# Patient Record
Sex: MALE | Race: BLACK OR AFRICAN AMERICAN | NOT HISPANIC OR LATINO
[De-identification: names, ages, dates, MRNs, and addresses within clinical notes are randomized per-mention and may not be internally consistent; named-entity substitution may affect disease eponyms.]

---

## 2017-01-03 ENCOUNTER — MEDICATION RENEWAL (OUTPATIENT)
Age: 47
End: 2017-01-03

## 2017-01-04 ENCOUNTER — MEDICATION RENEWAL (OUTPATIENT)
Age: 47
End: 2017-01-04

## 2017-02-17 ENCOUNTER — MEDICATION RENEWAL (OUTPATIENT)
Age: 47
End: 2017-02-17

## 2017-03-03 ENCOUNTER — APPOINTMENT (OUTPATIENT)
Dept: UROLOGY | Facility: CLINIC | Age: 47
End: 2017-03-03

## 2017-03-09 ENCOUNTER — APPOINTMENT (OUTPATIENT)
Age: 47
End: 2017-03-09

## 2017-03-13 ENCOUNTER — APPOINTMENT (OUTPATIENT)
Dept: UROLOGY | Facility: CLINIC | Age: 47
End: 2017-03-13

## 2017-04-18 ENCOUNTER — MEDICATION RENEWAL (OUTPATIENT)
Age: 47
End: 2017-04-18

## 2017-05-22 ENCOUNTER — MEDICATION RENEWAL (OUTPATIENT)
Age: 47
End: 2017-05-22

## 2017-05-24 ENCOUNTER — APPOINTMENT (OUTPATIENT)
Dept: INTERNAL MEDICINE | Facility: CLINIC | Age: 47
End: 2017-05-24

## 2017-06-21 ENCOUNTER — APPOINTMENT (OUTPATIENT)
Dept: INTERNAL MEDICINE | Facility: CLINIC | Age: 47
End: 2017-06-21

## 2017-06-21 VITALS
BODY MASS INDEX: 16.52 KG/M2 | TEMPERATURE: 97.9 F | WEIGHT: 118 LBS | OXYGEN SATURATION: 99 % | HEIGHT: 71 IN | HEART RATE: 88 BPM | SYSTOLIC BLOOD PRESSURE: 116 MMHG | DIASTOLIC BLOOD PRESSURE: 73 MMHG | RESPIRATION RATE: 17 BRPM

## 2017-07-01 ENCOUNTER — OUTPATIENT (OUTPATIENT)
Dept: OUTPATIENT SERVICES | Facility: HOSPITAL | Age: 47
LOS: 1 days | End: 2017-07-01
Payer: MEDICAID

## 2017-07-01 DIAGNOSIS — L02.31 CUTANEOUS ABSCESS OF BUTTOCK: Chronic | ICD-10-CM

## 2017-07-01 DIAGNOSIS — N18.6 END STAGE RENAL DISEASE: Chronic | ICD-10-CM

## 2017-07-01 DIAGNOSIS — T83.410S BREAKDOWN (MECHANICAL) OF IMPLANTED PENILE PROSTHESIS, SEQUELA: Chronic | ICD-10-CM

## 2017-07-01 DIAGNOSIS — I77.0 ARTERIOVENOUS FISTULA, ACQUIRED: Chronic | ICD-10-CM

## 2017-07-01 DIAGNOSIS — Z98.89 OTHER SPECIFIED POSTPROCEDURAL STATES: Chronic | ICD-10-CM

## 2017-07-06 ENCOUNTER — MEDICATION RENEWAL (OUTPATIENT)
Age: 47
End: 2017-07-06

## 2017-07-10 ENCOUNTER — APPOINTMENT (OUTPATIENT)
Dept: UROLOGY | Facility: CLINIC | Age: 47
End: 2017-07-10

## 2017-07-12 DIAGNOSIS — R69 ILLNESS, UNSPECIFIED: ICD-10-CM

## 2017-07-24 ENCOUNTER — APPOINTMENT (OUTPATIENT)
Dept: INTERNAL MEDICINE | Facility: CLINIC | Age: 47
End: 2017-07-24

## 2017-08-18 ENCOUNTER — MEDICATION RENEWAL (OUTPATIENT)
Age: 47
End: 2017-08-18

## 2017-08-21 ENCOUNTER — APPOINTMENT (OUTPATIENT)
Dept: INTERNAL MEDICINE | Facility: CLINIC | Age: 47
End: 2017-08-21

## 2017-08-23 ENCOUNTER — APPOINTMENT (OUTPATIENT)
Dept: INTERNAL MEDICINE | Facility: CLINIC | Age: 47
End: 2017-08-23
Payer: MEDICARE

## 2017-08-23 VITALS
TEMPERATURE: 99.2 F | HEART RATE: 63 BPM | OXYGEN SATURATION: 66 % | RESPIRATION RATE: 17 BRPM | HEIGHT: 71 IN | DIASTOLIC BLOOD PRESSURE: 95 MMHG | SYSTOLIC BLOOD PRESSURE: 170 MMHG

## 2017-08-23 PROCEDURE — 99214 OFFICE O/P EST MOD 30 MIN: CPT | Mod: Q5

## 2017-09-01 PROCEDURE — G9001: CPT

## 2017-09-06 ENCOUNTER — APPOINTMENT (OUTPATIENT)
Dept: OTOLARYNGOLOGY | Facility: CLINIC | Age: 47
End: 2017-09-06
Payer: MEDICARE

## 2017-09-06 VITALS
DIASTOLIC BLOOD PRESSURE: 75 MMHG | BODY MASS INDEX: 19.32 KG/M2 | HEART RATE: 71 BPM | SYSTOLIC BLOOD PRESSURE: 126 MMHG | HEIGHT: 71 IN | WEIGHT: 138 LBS

## 2017-09-06 DIAGNOSIS — Z86.79 PERSONAL HISTORY OF OTHER DISEASES OF THE CIRCULATORY SYSTEM: ICD-10-CM

## 2017-09-06 DIAGNOSIS — Z87.448 PERSONAL HISTORY OF OTHER DISEASES OF URINARY SYSTEM: ICD-10-CM

## 2017-09-06 DIAGNOSIS — F17.200 NICOTINE DEPENDENCE, UNSPECIFIED, UNCOMPLICATED: ICD-10-CM

## 2017-09-06 DIAGNOSIS — Z87.19 PERSONAL HISTORY OF OTHER DISEASES OF THE DIGESTIVE SYSTEM: ICD-10-CM

## 2017-09-06 PROCEDURE — 92567 TYMPANOMETRY: CPT

## 2017-09-06 PROCEDURE — 92557 COMPREHENSIVE HEARING TEST: CPT

## 2017-09-06 PROCEDURE — 99204 OFFICE O/P NEW MOD 45 MIN: CPT | Mod: 25

## 2017-09-09 ENCOUNTER — RX RENEWAL (OUTPATIENT)
Age: 47
End: 2017-09-09

## 2017-09-12 ENCOUNTER — MEDICATION RENEWAL (OUTPATIENT)
Age: 47
End: 2017-09-12

## 2017-09-15 ENCOUNTER — APPOINTMENT (OUTPATIENT)
Dept: OTOLARYNGOLOGY | Facility: CLINIC | Age: 47
End: 2017-09-15
Payer: MEDICARE

## 2017-09-15 ENCOUNTER — MEDICATION RENEWAL (OUTPATIENT)
Age: 47
End: 2017-09-15

## 2017-09-15 VITALS
HEART RATE: 72 BPM | SYSTOLIC BLOOD PRESSURE: 137 MMHG | WEIGHT: 138 LBS | BODY MASS INDEX: 19.32 KG/M2 | DIASTOLIC BLOOD PRESSURE: 83 MMHG | HEIGHT: 71 IN

## 2017-09-15 DIAGNOSIS — H93.8X1 OTHER SPECIFIED DISORDERS OF RIGHT EAR: ICD-10-CM

## 2017-09-15 PROCEDURE — 99213 OFFICE O/P EST LOW 20 MIN: CPT | Mod: 25

## 2017-09-15 PROCEDURE — 69210 REMOVE IMPACTED EAR WAX UNI: CPT

## 2017-09-27 ENCOUNTER — MEDICATION RENEWAL (OUTPATIENT)
Age: 47
End: 2017-09-27

## 2017-10-02 ENCOUNTER — RX RENEWAL (OUTPATIENT)
Age: 47
End: 2017-10-02

## 2017-10-02 ENCOUNTER — MEDICATION RENEWAL (OUTPATIENT)
Age: 47
End: 2017-10-02

## 2017-10-12 ENCOUNTER — MEDICATION RENEWAL (OUTPATIENT)
Age: 47
End: 2017-10-12

## 2017-10-12 RX ORDER — METOPROLOL SUCCINATE 50 MG/1
50 TABLET, EXTENDED RELEASE ORAL DAILY
Qty: 30 | Refills: 3 | Status: DISCONTINUED | COMMUNITY
End: 2017-10-12

## 2017-10-25 ENCOUNTER — APPOINTMENT (OUTPATIENT)
Dept: INTERNAL MEDICINE | Facility: CLINIC | Age: 47
End: 2017-10-25

## 2017-10-25 ENCOUNTER — APPOINTMENT (OUTPATIENT)
Dept: OTOLARYNGOLOGY | Facility: CLINIC | Age: 47
End: 2017-10-25

## 2017-10-25 RX ORDER — OXYCODONE 5 MG/1
5 TABLET ORAL
Refills: 0 | Status: DISCONTINUED | COMMUNITY
End: 2017-10-25

## 2017-11-01 ENCOUNTER — APPOINTMENT (OUTPATIENT)
Dept: OTOLARYNGOLOGY | Facility: CLINIC | Age: 47
End: 2017-11-01

## 2017-11-09 ENCOUNTER — RX RENEWAL (OUTPATIENT)
Age: 47
End: 2017-11-09

## 2017-11-22 ENCOUNTER — APPOINTMENT (OUTPATIENT)
Dept: OTOLARYNGOLOGY | Facility: CLINIC | Age: 47
End: 2017-11-22

## 2017-12-11 ENCOUNTER — APPOINTMENT (OUTPATIENT)
Dept: INTERNAL MEDICINE | Facility: CLINIC | Age: 47
End: 2017-12-11
Payer: MEDICARE

## 2017-12-11 VITALS
WEIGHT: 140 LBS | BODY MASS INDEX: 19.6 KG/M2 | HEART RATE: 96 BPM | RESPIRATION RATE: 17 BRPM | DIASTOLIC BLOOD PRESSURE: 75 MMHG | OXYGEN SATURATION: 94 % | HEIGHT: 71 IN | TEMPERATURE: 98.1 F | SYSTOLIC BLOOD PRESSURE: 134 MMHG

## 2017-12-11 PROCEDURE — 99396 PREV VISIT EST AGE 40-64: CPT

## 2017-12-14 ENCOUNTER — FORM ENCOUNTER (OUTPATIENT)
Age: 47
End: 2017-12-14

## 2017-12-15 ENCOUNTER — APPOINTMENT (OUTPATIENT)
Dept: RHEUMATOLOGY | Facility: CLINIC | Age: 47
End: 2017-12-15
Payer: MEDICARE

## 2017-12-15 ENCOUNTER — LABORATORY RESULT (OUTPATIENT)
Age: 47
End: 2017-12-15

## 2017-12-15 VITALS
BODY MASS INDEX: 19.6 KG/M2 | RESPIRATION RATE: 16 BRPM | SYSTOLIC BLOOD PRESSURE: 117 MMHG | OXYGEN SATURATION: 98 % | TEMPERATURE: 98 F | DIASTOLIC BLOOD PRESSURE: 72 MMHG | HEART RATE: 90 BPM | WEIGHT: 140 LBS | HEIGHT: 71 IN

## 2017-12-15 PROCEDURE — 73600 X-RAY EXAM OF ANKLE: CPT | Mod: RT

## 2017-12-15 PROCEDURE — 73080 X-RAY EXAM OF ELBOW: CPT | Mod: RT

## 2017-12-15 PROCEDURE — 73564 X-RAY EXAM KNEE 4 OR MORE: CPT | Mod: LT

## 2017-12-15 PROCEDURE — 99205 OFFICE O/P NEW HI 60 MIN: CPT

## 2017-12-15 PROCEDURE — 73130 X-RAY EXAM OF HAND: CPT | Mod: RT

## 2017-12-15 PROCEDURE — 73110 X-RAY EXAM OF WRIST: CPT | Mod: RT

## 2017-12-29 LAB
25(OH)D3 SERPL-MCNC: 19.7 NG/ML
ALBUMIN SERPL ELPH-MCNC: 4.3 G/DL
ALP BLD-CCNC: 174 U/L
ALT SERPL-CCNC: 31 U/L
ANA SER IF-ACNC: NEGATIVE
ANION GAP SERPL CALC-SCNC: 18 MMOL/L
APPEARANCE: CLEAR
APTT BLD: 30.8 SEC
AST SERPL-CCNC: 32 U/L
B2 GLYCOPROT1 AB SER QL: NEGATIVE
BACTERIA: NEGATIVE
BASOPHILS # BLD AUTO: 0.02 K/UL
BASOPHILS NFR BLD AUTO: 0.4 %
BILIRUB SERPL-MCNC: 0.7 MG/DL
BILIRUBIN URINE: NEGATIVE
BLOOD URINE: NEGATIVE
BUN SERPL-MCNC: 28 MG/DL
CALCIUM SERPL-MCNC: 9.1 MG/DL
CARDIOLIPIN AB SER IA-ACNC: NEGATIVE
CCP AB SER IA-ACNC: <8 UNITS
CENTROMERE IGG SER-ACNC: <0.2 AL
CHLORIDE SERPL-SCNC: 95 MMOL/L
CO2 SERPL-SCNC: 32 MMOL/L
COLOR: YELLOW
CREAT SERPL-MCNC: 5.74 MG/DL
CREAT SPEC-SCNC: 72 MG/DL
CREAT/PROT UR: 1.8 RATIO
CRP SERPL-MCNC: 0.4 MG/DL
DSDNA AB SER-ACNC: <12 IU/ML
ENA RNP AB SER IA-ACNC: <0.2 AL
ENA SCL70 IGG SER IA-ACNC: <0.2 AL
ENA SM AB SER IA-ACNC: <0.2 AL
ENA SS-A AB SER IA-ACNC: <0.2 AL
ENA SS-B AB SER IA-ACNC: <0.2 AL
EOSINOPHIL # BLD AUTO: 0.06 K/UL
EOSINOPHIL NFR BLD AUTO: 1.2
ERYTHROCYTE [SEDIMENTATION RATE] IN BLOOD BY WESTERGREN METHOD: 49 MM/HR
G6PD SER-CCNC: 15.6 U/G HGB
GLUCOSE QUALITATIVE U: NEGATIVE MG/DL
GLUCOSE SERPL-MCNC: 99 MG/DL
HCT VFR BLD CALC: 25.4 %
HGB BLD-MCNC: 8.1 G/DL
HYALINE CASTS: 2 /LPF
IMM GRANULOCYTES NFR BLD AUTO: 0.2 %
KETONES URINE: NEGATIVE
LEUKOCYTE ESTERASE URINE: NEGATIVE
LYMPHOCYTES # BLD AUTO: 1.76 K/UL
LYMPHOCYTES NFR BLD AUTO: 35.3 %
MAN DIFF?: NORMAL
MCHC RBC-ENTMCNC: 31.9 GM/DL
MCHC RBC-ENTMCNC: 33.6 PG
MCV RBC AUTO: 105.4 FL
MICROSCOPIC-UA: NORMAL
MONOCYTES # BLD AUTO: 0.86 K/UL
MONOCYTES NFR BLD AUTO: 17.2 %
NEUTROPHILS # BLD AUTO: 2.28 K/UL
NEUTROPHILS NFR BLD AUTO: 45.7 %
NITRITE URINE: NEGATIVE
PH URINE: >8.5
PLATELET # BLD AUTO: 187 K/UL
POTASSIUM SERPL-SCNC: 4.5 MMOL/L
PROT SERPL-MCNC: 7.4 G/DL
PROT UR-MCNC: 127 MG/DL
PROTEIN URINE: 100 MG/DL
RBC # BLD: 2.41 M/UL
RBC # FLD: 20.7 %
RED BLOOD CELLS URINE: 1 /HPF
RF+CCP IGG SER-IMP: NEGATIVE
RHEUMATOID FACT SER QL: 15 IU/ML
RNA POLYMERASE III IGG: <10 U
SODIUM SERPL-SCNC: 145 MMOL/L
SPECIFIC GRAVITY URINE: 1.01
SQUAMOUS EPITHELIAL CELLS: 1 /HPF
THYROGLOB AB SERPL-ACNC: <20 IU/ML
THYROPEROXIDASE AB SERPL IA-ACNC: <10 IU/ML
TSH SERPL-ACNC: 3.05 UIU/ML
URATE SERPL-MCNC: 3.9 MG/DL
UROBILINOGEN URINE: NEGATIVE MG/DL
WBC # FLD AUTO: 4.99 K/UL
WHITE BLOOD CELLS URINE: 1 /HPF

## 2018-01-05 ENCOUNTER — RX RENEWAL (OUTPATIENT)
Age: 48
End: 2018-01-05

## 2018-01-19 ENCOUNTER — APPOINTMENT (OUTPATIENT)
Dept: RHEUMATOLOGY | Facility: CLINIC | Age: 48
End: 2018-01-19
Payer: MEDICARE

## 2018-01-19 VITALS
OXYGEN SATURATION: 98 % | DIASTOLIC BLOOD PRESSURE: 96 MMHG | SYSTOLIC BLOOD PRESSURE: 156 MMHG | HEART RATE: 63 BPM | TEMPERATURE: 98.5 F | HEIGHT: 71 IN

## 2018-01-19 DIAGNOSIS — L94.3 SCLERODACTYLY: ICD-10-CM

## 2018-01-19 PROCEDURE — 99213 OFFICE O/P EST LOW 20 MIN: CPT

## 2018-01-28 PROBLEM — L94.3 SCLERODACTYLY: Status: ACTIVE | Noted: 2018-01-28

## 2018-01-28 PROBLEM — L94.3 SCLERODACTYLY: Status: RESOLVED | Noted: 2017-12-15 | Resolved: 2018-01-28

## 2018-02-02 ENCOUNTER — APPOINTMENT (OUTPATIENT)
Dept: INTERNAL MEDICINE | Facility: CLINIC | Age: 48
End: 2018-02-02

## 2018-02-05 ENCOUNTER — APPOINTMENT (OUTPATIENT)
Dept: OTOLARYNGOLOGY | Facility: CLINIC | Age: 48
End: 2018-02-05

## 2018-02-07 ENCOUNTER — APPOINTMENT (OUTPATIENT)
Dept: INTERNAL MEDICINE | Facility: CLINIC | Age: 48
End: 2018-02-07
Payer: MEDICARE

## 2018-02-07 VITALS
TEMPERATURE: 98.1 F | HEIGHT: 71 IN | WEIGHT: 145 LBS | SYSTOLIC BLOOD PRESSURE: 109 MMHG | BODY MASS INDEX: 20.3 KG/M2 | DIASTOLIC BLOOD PRESSURE: 70 MMHG | OXYGEN SATURATION: 99 % | RESPIRATION RATE: 16 BRPM | HEART RATE: 74 BPM

## 2018-02-07 DIAGNOSIS — D64.9 ANEMIA, UNSPECIFIED: ICD-10-CM

## 2018-02-07 PROCEDURE — 93000 ELECTROCARDIOGRAM COMPLETE: CPT

## 2018-02-07 PROCEDURE — 90471 IMMUNIZATION ADMIN: CPT

## 2018-02-07 PROCEDURE — 90733 MPSV4 VACCINE SUBQ: CPT

## 2018-02-07 PROCEDURE — 99215 OFFICE O/P EST HI 40 MIN: CPT | Mod: 25

## 2018-02-07 RX ORDER — FAMOTIDINE 40 MG/1
40 TABLET, FILM COATED ORAL
Refills: 0 | Status: DISCONTINUED | COMMUNITY
End: 2018-02-07

## 2018-02-07 RX ORDER — GABAPENTIN 100 MG/1
100 CAPSULE ORAL
Refills: 0 | Status: DISCONTINUED | COMMUNITY
End: 2018-02-07

## 2018-02-07 RX ORDER — SUCROFERRIC OXYHYDROXIDE 500 MG/1
500 TABLET, CHEWABLE ORAL
Refills: 0 | Status: ACTIVE | COMMUNITY

## 2018-02-07 RX ORDER — METHYLPREDNISOLONE 4 MG/1
4 TABLET ORAL
Qty: 21 | Refills: 0 | Status: DISCONTINUED | COMMUNITY
Start: 2017-09-06 | End: 2018-02-07

## 2018-02-21 ENCOUNTER — APPOINTMENT (OUTPATIENT)
Dept: OTOLARYNGOLOGY | Facility: CLINIC | Age: 48
End: 2018-02-21

## 2018-02-21 ENCOUNTER — APPOINTMENT (OUTPATIENT)
Dept: INTERNAL MEDICINE | Facility: CLINIC | Age: 48
End: 2018-02-21
Payer: MEDICARE

## 2018-02-21 VITALS
BODY MASS INDEX: 19.46 KG/M2 | WEIGHT: 139 LBS | TEMPERATURE: 98.7 F | DIASTOLIC BLOOD PRESSURE: 74 MMHG | RESPIRATION RATE: 17 BRPM | HEART RATE: 69 BPM | OXYGEN SATURATION: 96 % | HEIGHT: 71 IN | SYSTOLIC BLOOD PRESSURE: 149 MMHG

## 2018-02-21 DIAGNOSIS — Z90.411 OTHER SPECIFIED POSTPROCEDURAL STATES: ICD-10-CM

## 2018-02-21 DIAGNOSIS — Z98.890 OTHER SPECIFIED POSTPROCEDURAL STATES: ICD-10-CM

## 2018-02-21 PROCEDURE — 99214 OFFICE O/P EST MOD 30 MIN: CPT

## 2018-03-09 ENCOUNTER — MEDICATION RENEWAL (OUTPATIENT)
Age: 48
End: 2018-03-09

## 2018-03-19 ENCOUNTER — APPOINTMENT (OUTPATIENT)
Dept: NEPHROLOGY | Facility: CLINIC | Age: 48
End: 2018-03-19
Payer: COMMERCIAL

## 2018-03-19 VITALS
HEART RATE: 78 BPM | TEMPERATURE: 98 F | WEIGHT: 143 LBS | RESPIRATION RATE: 14 BRPM | SYSTOLIC BLOOD PRESSURE: 155 MMHG | BODY MASS INDEX: 20.02 KG/M2 | HEIGHT: 71 IN | OXYGEN SATURATION: 97 % | DIASTOLIC BLOOD PRESSURE: 79 MMHG

## 2018-03-19 PROCEDURE — 99204 OFFICE O/P NEW MOD 45 MIN: CPT

## 2018-03-22 ENCOUNTER — RX RENEWAL (OUTPATIENT)
Age: 48
End: 2018-03-22

## 2018-04-06 ENCOUNTER — RX RENEWAL (OUTPATIENT)
Age: 48
End: 2018-04-06

## 2018-04-13 ENCOUNTER — APPOINTMENT (OUTPATIENT)
Dept: HEMATOLOGY ONCOLOGY | Facility: CLINIC | Age: 48
End: 2018-04-13

## 2018-04-23 ENCOUNTER — APPOINTMENT (OUTPATIENT)
Dept: RHEUMATOLOGY | Facility: CLINIC | Age: 48
End: 2018-04-23

## 2018-05-01 ENCOUNTER — RX RENEWAL (OUTPATIENT)
Age: 48
End: 2018-05-01

## 2018-05-08 ENCOUNTER — MEDICATION RENEWAL (OUTPATIENT)
Age: 48
End: 2018-05-08

## 2018-05-14 ENCOUNTER — RX RENEWAL (OUTPATIENT)
Age: 48
End: 2018-05-14

## 2018-05-23 ENCOUNTER — RX RENEWAL (OUTPATIENT)
Age: 48
End: 2018-05-23

## 2018-06-15 ENCOUNTER — NON-APPOINTMENT (OUTPATIENT)
Age: 48
End: 2018-06-15

## 2018-06-15 ENCOUNTER — APPOINTMENT (OUTPATIENT)
Dept: CARDIOLOGY | Facility: CLINIC | Age: 48
End: 2018-06-15
Payer: MEDICARE

## 2018-06-15 VITALS — SYSTOLIC BLOOD PRESSURE: 124 MMHG | DIASTOLIC BLOOD PRESSURE: 70 MMHG

## 2018-06-15 VITALS
SYSTOLIC BLOOD PRESSURE: 137 MMHG | DIASTOLIC BLOOD PRESSURE: 81 MMHG | HEART RATE: 71 BPM | BODY MASS INDEX: 20.02 KG/M2 | HEIGHT: 71 IN | WEIGHT: 143 LBS

## 2018-06-15 PROCEDURE — 99215 OFFICE O/P EST HI 40 MIN: CPT

## 2018-06-15 PROCEDURE — 93000 ELECTROCARDIOGRAM COMPLETE: CPT

## 2018-06-15 RX ORDER — NIFEDIPINE 90 MG/1
90 TABLET, EXTENDED RELEASE ORAL DAILY
Qty: 30 | Refills: 5 | Status: DISCONTINUED | COMMUNITY
End: 2018-06-15

## 2018-06-15 RX ORDER — NIFEDIPINE 90 MG/1
90 TABLET, EXTENDED RELEASE ORAL
Qty: 90 | Refills: 0 | Status: COMPLETED | COMMUNITY
Start: 2018-02-07

## 2018-06-20 ENCOUNTER — RX RENEWAL (OUTPATIENT)
Age: 48
End: 2018-06-20

## 2018-07-04 ENCOUNTER — RX RENEWAL (OUTPATIENT)
Age: 48
End: 2018-07-04

## 2018-07-06 ENCOUNTER — APPOINTMENT (OUTPATIENT)
Dept: OTOLARYNGOLOGY | Facility: CLINIC | Age: 48
End: 2018-07-06
Payer: MEDICARE

## 2018-07-06 VITALS
WEIGHT: 143 LBS | HEIGHT: 71 IN | SYSTOLIC BLOOD PRESSURE: 144 MMHG | DIASTOLIC BLOOD PRESSURE: 83 MMHG | HEART RATE: 72 BPM | BODY MASS INDEX: 20.02 KG/M2

## 2018-07-06 DIAGNOSIS — H65.491 OTHER CHRONIC NONSUPPURATIVE OTITIS MEDIA, RIGHT EAR: ICD-10-CM

## 2018-07-06 DIAGNOSIS — H90.8 MIXED CONDUCTIVE AND SENSORINEURAL HEARING LOSS, UNSPECIFIED: ICD-10-CM

## 2018-07-06 PROCEDURE — 99214 OFFICE O/P EST MOD 30 MIN: CPT | Mod: 25

## 2018-07-06 PROCEDURE — 69210 REMOVE IMPACTED EAR WAX UNI: CPT

## 2018-07-09 ENCOUNTER — APPOINTMENT (OUTPATIENT)
Dept: CARDIOLOGY | Facility: CLINIC | Age: 48
End: 2018-07-09

## 2018-07-11 ENCOUNTER — APPOINTMENT (OUTPATIENT)
Dept: CARDIOLOGY | Facility: CLINIC | Age: 48
End: 2018-07-11
Payer: MEDICARE

## 2018-07-11 PROCEDURE — A9500: CPT

## 2018-07-11 PROCEDURE — 93015 CV STRESS TEST SUPVJ I&R: CPT

## 2018-07-11 PROCEDURE — 78452 HT MUSCLE IMAGE SPECT MULT: CPT

## 2018-07-24 ENCOUNTER — RX RENEWAL (OUTPATIENT)
Age: 48
End: 2018-07-24

## 2018-07-27 ENCOUNTER — OUTPATIENT (OUTPATIENT)
Dept: OUTPATIENT SERVICES | Facility: HOSPITAL | Age: 48
LOS: 1 days | Discharge: HOME | End: 2018-07-27
Payer: MEDICARE

## 2018-07-27 DIAGNOSIS — I77.0 ARTERIOVENOUS FISTULA, ACQUIRED: Chronic | ICD-10-CM

## 2018-07-27 DIAGNOSIS — Z98.89 OTHER SPECIFIED POSTPROCEDURAL STATES: Chronic | ICD-10-CM

## 2018-07-27 DIAGNOSIS — T83.410S BREAKDOWN (MECHANICAL) OF IMPLANTED PENILE PROSTHESIS, SEQUELA: Chronic | ICD-10-CM

## 2018-07-27 DIAGNOSIS — L02.31 CUTANEOUS ABSCESS OF BUTTOCK: Chronic | ICD-10-CM

## 2018-07-27 DIAGNOSIS — C25.9 MALIGNANT NEOPLASM OF PANCREAS, UNSPECIFIED: ICD-10-CM

## 2018-07-27 DIAGNOSIS — N18.6 END STAGE RENAL DISEASE: Chronic | ICD-10-CM

## 2018-07-31 ENCOUNTER — RESULT REVIEW (OUTPATIENT)
Age: 48
End: 2018-07-31

## 2018-07-31 PROCEDURE — 88321 CONSLTJ&REPRT SLD PREP ELSWR: CPT

## 2018-08-01 ENCOUNTER — APPOINTMENT (OUTPATIENT)
Dept: HEMATOLOGY ONCOLOGY | Facility: CLINIC | Age: 48
End: 2018-08-01
Payer: MEDICARE

## 2018-08-01 VITALS
TEMPERATURE: 99.5 F | DIASTOLIC BLOOD PRESSURE: 69 MMHG | HEIGHT: 70.47 IN | RESPIRATION RATE: 16 BRPM | HEART RATE: 77 BPM | BODY MASS INDEX: 19 KG/M2 | SYSTOLIC BLOOD PRESSURE: 117 MMHG | WEIGHT: 134.24 LBS | OXYGEN SATURATION: 96 %

## 2018-08-01 DIAGNOSIS — Z87.891 PERSONAL HISTORY OF NICOTINE DEPENDENCE: ICD-10-CM

## 2018-08-01 DIAGNOSIS — H40.9 UNSPECIFIED GLAUCOMA: ICD-10-CM

## 2018-08-01 PROCEDURE — 99205 OFFICE O/P NEW HI 60 MIN: CPT

## 2018-08-01 RX ORDER — METHYLPREDNISOLONE 4 MG/1
4 TABLET ORAL
Qty: 21 | Refills: 0 | Status: DISCONTINUED | COMMUNITY
Start: 2018-07-06 | End: 2018-08-01

## 2018-08-01 RX ORDER — LATANOPROST/PF 0.005 %
0.01 DROPS OPHTHALMIC (EYE)
Refills: 0 | Status: ACTIVE | COMMUNITY
Start: 2018-08-01

## 2018-08-01 RX ORDER — OXYCODONE 5 MG/1
5 TABLET ORAL
Qty: 30 | Refills: 0 | Status: DISCONTINUED | COMMUNITY
Start: 2018-02-21 | End: 2018-08-01

## 2018-08-05 ENCOUNTER — RX RENEWAL (OUTPATIENT)
Age: 48
End: 2018-08-05

## 2018-08-16 ENCOUNTER — RX RENEWAL (OUTPATIENT)
Age: 48
End: 2018-08-16

## 2018-08-17 ENCOUNTER — MESSAGE (OUTPATIENT)
Age: 48
End: 2018-08-17

## 2018-08-17 ENCOUNTER — MEDICATION RENEWAL (OUTPATIENT)
Age: 48
End: 2018-08-17

## 2018-08-29 ENCOUNTER — RX RENEWAL (OUTPATIENT)
Age: 48
End: 2018-08-29

## 2018-08-29 ENCOUNTER — APPOINTMENT (OUTPATIENT)
Dept: INTERNAL MEDICINE | Facility: CLINIC | Age: 48
End: 2018-08-29
Payer: MEDICARE

## 2018-08-29 VITALS
HEIGHT: 70 IN | DIASTOLIC BLOOD PRESSURE: 70 MMHG | WEIGHT: 134 LBS | RESPIRATION RATE: 16 BRPM | HEART RATE: 80 BPM | SYSTOLIC BLOOD PRESSURE: 120 MMHG | OXYGEN SATURATION: 96 % | BODY MASS INDEX: 19.18 KG/M2

## 2018-08-29 PROCEDURE — 99214 OFFICE O/P EST MOD 30 MIN: CPT

## 2018-08-30 NOTE — PHYSICAL EXAM
[General Appearance - Alert] : alert [General Appearance - In No Acute Distress] : in no acute distress [General Appearance - Well Nourished] : well nourished [General Appearance - Well Developed] : well developed [Sclera] : the sclera and conjunctiva were normal [PERRL With Normal Accommodation] : pupils were equal in size, round, and reactive to light [Extraocular Movements] : extraocular movements were intact [Outer Ear] : the ears and nose were normal in appearance [Oropharynx] : the oropharynx was normal [Neck Appearance] : the appearance of the neck was normal [Neck Cervical Mass (___cm)] : no neck mass was observed [Jugular Venous Distention Increased] : there was no jugular-venous distention [Thyroid Diffuse Enlargement] : the thyroid was not enlarged [Thyroid Nodule] : there were no palpable thyroid nodules [Auscultation Breath Sounds / Voice Sounds] : lungs were clear to auscultation bilaterally [Apical Impulse] : the apical impulse was normal [Heart Rate And Rhythm] : heart rate was normal and rhythm regular [Heart Sounds] : normal S1 and S2 [Bowel Sounds] : normal bowel sounds [Abdomen Soft] : soft [Abdomen Tenderness] : non-tender [Abdomen Mass (___ Cm)] : no abdominal mass palpated [Cervical Lymph Nodes Enlarged Posterior Bilaterally] : posterior cervical [Cervical Lymph Nodes Enlarged Anterior Bilaterally] : anterior cervical [Abnormal Walk] : normal gait [Nail Clubbing] : no clubbing  or cyanosis of the fingernails [Musculoskeletal - Swelling] : no joint swelling seen [Motor Tone] : muscle strength and tone were normal [Skin Color & Pigmentation] : normal skin color and pigmentation [Skin Turgor] : normal skin turgor [] : no rash [Deep Tendon Reflexes (DTR)] : deep tendon reflexes were 2+ and symmetric [Sensation] : the sensory exam was normal to light touch and pinprick [No Focal Deficits] : no focal deficits [Oriented To Time, Place, And Person] : oriented to person, place, and time [Impaired Insight] : insight and judgment were intact [Affect] : the affect was normal [FreeTextEntry1] : L arm fistula with strong thrill, walking with normal gait

## 2018-08-30 NOTE — HISTORY OF PRESENT ILLNESS
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  \par \par For hx of ESRD, he f/u [facility] kidney transplant team next month, on list 5 years\par \par Dr. Gray new nephrologist\par \par here to complete disability permit application\par \par \par Meds reviewed: hydralazine 50 TID, nifedipine 90 QD, metoprolol 50 bid, ASA, stool softener\par alprazolam .25, famotidine 20, oxy 5 PRN daily\par HTN- his blood pressure is stable today \par FamHX: father with HTN and PPD, mother with HTN\par Allergies: Morphine (Swelling)\par \par

## 2018-08-30 NOTE — ASSESSMENT
[FreeTextEntry1] : \par Patient is a 47 yo with PMH  ESRD on HD T/Th/Sat via L arm fistula since Aug 2013 (at [facility], followed by nephrologist ), essential HTN, RA, gout, anemia, impotence s/p implantation of penile prosthetic presents f/u chronic medical conditions listed below\par \par 1. essential HTN\par -con't current\par -f/u with nephrologist for BP med adjustment\par \par \par 2. ESRD\par - on HD T/Th/Sat via L arm fistula since Aug 2013 (at [facility], followed by nephrologist - Xu Quezada)\par -con't sodium bicard TID, Renagel\par -evaluated for kidney transplant\par -renal diet\par \par 3. gout\par -on allopurinol\par -no recent exacerbations\par -can check UA level with next labs\par \par 4. anemia\par -likely AOCD \par -con't to monitor CBC\par \par 5. impotence\par -s/p implantation of penile prosthetic\par -f/u with urology\par \par 6. disabilty parking permit application completed\par \par RTC 3mos

## 2018-09-06 ENCOUNTER — FORM ENCOUNTER (OUTPATIENT)
Age: 48
End: 2018-09-06

## 2018-09-07 ENCOUNTER — APPOINTMENT (OUTPATIENT)
Dept: NUCLEAR MEDICINE | Facility: CLINIC | Age: 48
End: 2018-09-07

## 2018-09-07 ENCOUNTER — OUTPATIENT (OUTPATIENT)
Dept: OUTPATIENT SERVICES | Facility: HOSPITAL | Age: 48
LOS: 1 days | End: 2018-09-07
Payer: COMMERCIAL

## 2018-09-07 DIAGNOSIS — T83.410S BREAKDOWN (MECHANICAL) OF IMPLANTED PENILE PROSTHESIS, SEQUELA: Chronic | ICD-10-CM

## 2018-09-07 DIAGNOSIS — Z00.8 ENCOUNTER FOR OTHER GENERAL EXAMINATION: ICD-10-CM

## 2018-09-07 DIAGNOSIS — I77.0 ARTERIOVENOUS FISTULA, ACQUIRED: Chronic | ICD-10-CM

## 2018-09-07 DIAGNOSIS — L02.31 CUTANEOUS ABSCESS OF BUTTOCK: Chronic | ICD-10-CM

## 2018-09-07 DIAGNOSIS — N18.6 END STAGE RENAL DISEASE: Chronic | ICD-10-CM

## 2018-09-07 DIAGNOSIS — Z98.89 OTHER SPECIFIED POSTPROCEDURAL STATES: Chronic | ICD-10-CM

## 2018-09-07 PROCEDURE — 78815 PET IMAGE W/CT SKULL-THIGH: CPT | Mod: 26,PS

## 2018-09-07 PROCEDURE — 78815 PET IMAGE W/CT SKULL-THIGH: CPT

## 2018-09-07 PROCEDURE — A9587: CPT

## 2018-09-10 ENCOUNTER — APPOINTMENT (OUTPATIENT)
Age: 48
End: 2018-09-10

## 2018-09-10 ENCOUNTER — APPOINTMENT (OUTPATIENT)
Dept: TRANSPLANT | Facility: CLINIC | Age: 48
End: 2018-09-10

## 2018-09-12 ENCOUNTER — RX RENEWAL (OUTPATIENT)
Age: 48
End: 2018-09-12

## 2018-09-15 ENCOUNTER — RX RENEWAL (OUTPATIENT)
Age: 48
End: 2018-09-15

## 2018-09-20 ENCOUNTER — FORM ENCOUNTER (OUTPATIENT)
Age: 48
End: 2018-09-20

## 2018-09-21 ENCOUNTER — OUTPATIENT (OUTPATIENT)
Dept: OUTPATIENT SERVICES | Facility: HOSPITAL | Age: 48
LOS: 1 days | End: 2018-09-21
Payer: COMMERCIAL

## 2018-09-21 ENCOUNTER — APPOINTMENT (OUTPATIENT)
Dept: MRI IMAGING | Facility: CLINIC | Age: 48
End: 2018-09-21
Payer: MEDICARE

## 2018-09-21 DIAGNOSIS — L02.31 CUTANEOUS ABSCESS OF BUTTOCK: Chronic | ICD-10-CM

## 2018-09-21 DIAGNOSIS — T83.410S BREAKDOWN (MECHANICAL) OF IMPLANTED PENILE PROSTHESIS, SEQUELA: Chronic | ICD-10-CM

## 2018-09-21 DIAGNOSIS — D3A.8 OTHER BENIGN NEUROENDOCRINE TUMORS: ICD-10-CM

## 2018-09-21 DIAGNOSIS — N18.6 END STAGE RENAL DISEASE: Chronic | ICD-10-CM

## 2018-09-21 DIAGNOSIS — I77.0 ARTERIOVENOUS FISTULA, ACQUIRED: Chronic | ICD-10-CM

## 2018-09-21 DIAGNOSIS — Z98.89 OTHER SPECIFIED POSTPROCEDURAL STATES: Chronic | ICD-10-CM

## 2018-09-21 PROCEDURE — 72195 MRI PELVIS W/O DYE: CPT | Mod: 26

## 2018-09-21 PROCEDURE — 72195 MRI PELVIS W/O DYE: CPT

## 2018-10-29 ENCOUNTER — RX RENEWAL (OUTPATIENT)
Age: 48
End: 2018-10-29

## 2018-10-30 ENCOUNTER — MEDICATION RENEWAL (OUTPATIENT)
Age: 48
End: 2018-10-30

## 2018-11-07 ENCOUNTER — APPOINTMENT (OUTPATIENT)
Dept: NEPHROLOGY | Facility: CLINIC | Age: 48
End: 2018-11-07
Payer: COMMERCIAL

## 2018-11-07 ENCOUNTER — APPOINTMENT (OUTPATIENT)
Dept: TRANSPLANT | Facility: CLINIC | Age: 48
End: 2018-11-07
Payer: COMMERCIAL

## 2018-11-07 ENCOUNTER — LABORATORY RESULT (OUTPATIENT)
Age: 48
End: 2018-11-07

## 2018-11-07 VITALS
HEIGHT: 70 IN | DIASTOLIC BLOOD PRESSURE: 73 MMHG | TEMPERATURE: 98.4 F | BODY MASS INDEX: 19.9 KG/M2 | WEIGHT: 139 LBS | SYSTOLIC BLOOD PRESSURE: 137 MMHG | HEART RATE: 76 BPM | OXYGEN SATURATION: 99 % | RESPIRATION RATE: 12 BRPM

## 2018-11-07 PROCEDURE — 99214 OFFICE O/P EST MOD 30 MIN: CPT

## 2018-11-07 PROCEDURE — 99215 OFFICE O/P EST HI 40 MIN: CPT

## 2018-11-13 LAB
ABO + RH PNL BLD: NORMAL
ALBUMIN SERPL ELPH-MCNC: 4.4 G/DL
ALP BLD-CCNC: 168 U/L
ALT SERPL-CCNC: 44 U/L
ANION GAP SERPL CALC-SCNC: 19 MMOL/L
AST SERPL-CCNC: 73 U/L
BASOPHILS # BLD AUTO: 0.12 K/UL
BASOPHILS NFR BLD AUTO: 1.5 %
BILIRUB SERPL-MCNC: 1.2 MG/DL
BUN SERPL-MCNC: 27 MG/DL
C PEPTIDE SERPL-MCNC: 4.7 NG/ML
CALCIUM SERPL-MCNC: 9.8 MG/DL
CHLORIDE SERPL-SCNC: 93 MMOL/L
CMV IGG SERPL QL: >10 U/ML
CMV IGG SERPL-IMP: POSITIVE
CO2 SERPL-SCNC: 30 MMOL/L
CREAT SERPL-MCNC: 7.3 MG/DL
EBV EA AB SER IA-ACNC: 26.1 U/ML
EBV EA AB TITR SER IF: POSITIVE
EBV EA IGG SER QL IA: 372 U/ML
EBV EA IGG SER-ACNC: POSITIVE
EBV EA IGM SER IA-ACNC: NEGATIVE
EBV PATRN SPEC IB-IMP: NORMAL
EBV VCA IGG SER IA-ACNC: 167 U/ML
EBV VCA IGM SER QL IA: <10 U/ML
EOSINOPHIL # BLD AUTO: 0.31 K/UL
EOSINOPHIL NFR BLD AUTO: 4 %
EPSTEIN-BARR VIRUS CAPSID ANTIGEN IGG: POSITIVE
GLUCOSE SERPL-MCNC: 80 MG/DL
HBA1C MFR BLD HPLC: 4.3 %
HBV CORE IGG+IGM SER QL: NONREACTIVE
HBV SURFACE AB SER QL: REACTIVE
HBV SURFACE AG SER QL: NONREACTIVE
HCG SERPL-MCNC: 1 MIU/ML
HCT VFR BLD CALC: 36.7 %
HCV AB SER QL: NONREACTIVE
HCV S/CO RATIO: 0.1 S/CO
HEPATITIS A IGG ANTIBODY: NONREACTIVE
HGB BLD-MCNC: 12.2 G/DL
HIV1+2 AB SPEC QL IA.RAPID: NONREACTIVE
HSV 1+2 IGG SER IA-IMP: POSITIVE
HSV 1+2 IGG SER IA-IMP: POSITIVE
HSV1 IGG SER QL: 20.4 INDEX
HSV2 IGG SER QL: 6.1 INDEX
IMM GRANULOCYTES NFR BLD AUTO: 0.1 %
LYMPHOCYTES # BLD AUTO: 2.77 K/UL
LYMPHOCYTES NFR BLD AUTO: 35.5 %
M TB IFN-G BLD-IMP: NEGATIVE
MAGNESIUM SERPL-MCNC: 2.2 MG/DL
MAN DIFF?: NORMAL
MCHC RBC-ENTMCNC: 33.2 GM/DL
MCHC RBC-ENTMCNC: 36.3 PG
MCV RBC AUTO: 109.2 FL
MONOCYTES # BLD AUTO: 1.59 K/UL
MONOCYTES NFR BLD AUTO: 20.4 %
NEUTROPHILS # BLD AUTO: 3.01 K/UL
NEUTROPHILS NFR BLD AUTO: 38.5 %
PHOSPHATE SERPL-MCNC: 4.8 MG/DL
PLATELET # BLD AUTO: 239 K/UL
POTASSIUM SERPL-SCNC: 4.8 MMOL/L
PROT SERPL-MCNC: 8.2 G/DL
PSA SERPL-MCNC: 0.51 NG/ML
QUANTIFERON TB PLUS MITOGEN MINUS NIL: 6.47 IU/ML
QUANTIFERON TB PLUS NIL: 0.13 IU/ML
QUANTIFERON TB PLUS TB1 MINUS NIL: -0.01 IU/ML
QUANTIFERON TB PLUS TB2 MINUS NIL: -0.02 IU/ML
RBC # BLD: 3.36 M/UL
RBC # FLD: 16.4 %
RUBV IGG FLD-ACNC: 2.7 INDEX
RUBV IGG SER-IMP: POSITIVE
SODIUM SERPL-SCNC: 142 MMOL/L
T GONDII AB SER-IMP: NEGATIVE
T GONDII IGG SER QL: <3 IU/ML
T PALLIDUM AB SER QL IA: NEGATIVE
URATE SERPL-MCNC: 3.8 MG/DL
VZV AB TITR SER: POSITIVE
VZV IGG SER IF-ACNC: >4000 INDEX
WBC # FLD AUTO: 7.81 K/UL

## 2018-11-19 ENCOUNTER — OTHER (OUTPATIENT)
Age: 48
End: 2018-11-19

## 2018-12-14 ENCOUNTER — NON-APPOINTMENT (OUTPATIENT)
Age: 48
End: 2018-12-14

## 2018-12-14 ENCOUNTER — APPOINTMENT (OUTPATIENT)
Dept: CARDIOLOGY | Facility: CLINIC | Age: 48
End: 2018-12-14
Payer: COMMERCIAL

## 2018-12-14 VITALS
HEIGHT: 70 IN | SYSTOLIC BLOOD PRESSURE: 114 MMHG | DIASTOLIC BLOOD PRESSURE: 66 MMHG | WEIGHT: 137 LBS | BODY MASS INDEX: 19.61 KG/M2 | HEART RATE: 72 BPM | OXYGEN SATURATION: 98 %

## 2018-12-14 PROCEDURE — 99214 OFFICE O/P EST MOD 30 MIN: CPT

## 2018-12-18 ENCOUNTER — APPOINTMENT (OUTPATIENT)
Dept: INTERNAL MEDICINE | Facility: CLINIC | Age: 48
End: 2018-12-18

## 2018-12-26 NOTE — HISTORY OF PRESENT ILLNESS
[FreeTextEntry1] : Patient is a 48 year-old  gentleman with a history of end-stage renal disease on dialysis (Tues-Thurs-Sat via left forearm AV fistula), on the renal transplant list, rheumatoid arthritis, hypertension who presents today for a followup cardiac evaluation. He was most recently seen by Roman Goodson MD in June 2018. He checked a nuclear stress test which was negative for ischemia and showed normal LV systolic function. It was a pharmacologic stress test because he has leg pain after a hip fracture in 2017 that would have limited him on treadmill.\par  \par He denies any angina, paroxysmal nocturnal dyspnea, orthopnea, lower extremity edema, near syncope, syncope, stroke like symptoms, or palpitations.\par \par He is compliant with his medications. \par His dry weight is 63 kg.\par \par PMD: Silvia Cosby MD [phone]\par Cardiologist: Roman Goodson MD [phone]\par Hematologist: Luis Raphael MD [phone]\par Nephrologist: Denys Chang DO [phone]\par Ophthalmologist: Elham Ulloa MD [phone]\par Rheumatologist: Ileana Little DO [phone]\par Vascular surgeon: Francsico Shaw MD [phone]

## 2018-12-26 NOTE — REVIEW OF SYSTEMS
[Dyspnea on exertion] : dyspnea during exertion [Negative] : Heme/Lymph [Shortness Of Breath] : no shortness of breath [Chest  Pressure] : no chest pressure [Lower Ext Edema] : no extremity edema

## 2018-12-26 NOTE — DISCUSSION/SUMMARY
[FreeTextEntry1] : Patient is a very pleasant 48 year-old with cardiovascular risk factors as above who is being evaluated for possible renal transplant.\par His EKG is within normal limits and his blood pressure is well controlled at today's visit.\par He had a negative ischemic evaluation by nuclear stress test in July 2018.\par \par He should have a repeat echocardiogram to evaluate for structural heart disease, but there is no evidence of any on exam, but there is no evidence of any on exam or prior TTE (September 2016, showing only mild calcification of the aortic valve).\par \par No further cardiovascular testing is necessary prior to possible renal transplant. Would reassess ischemic evaluation in one year or earlier if clinical status changes.\par \par Continue all cardiac care per Roman Goodson MD.\par

## 2018-12-26 NOTE — REASON FOR VISIT
[Initial Evaluation] : an initial evaluation of [Other: _____] : [unfilled] [FreeTextEntry2] : pretransplant cardiac evaluation prior to possible kidney transplant

## 2018-12-26 NOTE — PHYSICAL EXAM
[Well Groomed] : well groomed [General Appearance - In No Acute Distress] : no acute distress [Normal Conjunctiva] : the conjunctiva exhibited no abnormalities [Eyelids - No Xanthelasma] : the eyelids demonstrated no xanthelasmas [Normal Oral Mucosa] : normal oral mucosa [No Oral Pallor] : no oral pallor [No Oral Cyanosis] : no oral cyanosis [Respiration, Rhythm And Depth] : normal respiratory rhythm and effort [Exaggerated Use Of Accessory Muscles For Inspiration] : no accessory muscle use [Auscultation Breath Sounds / Voice Sounds] : lungs were clear to auscultation bilaterally [Abdomen Soft] : soft [Abdomen Tenderness] : non-tender [Abdomen Mass (___ Cm)] : no abdominal mass palpated [Abnormal Walk] : normal gait [Petechial Hemorrhages (___cm)] : no petechial hemorrhages [Skin Color & Pigmentation] : normal skin color and pigmentation [] : no rash [Oriented To Time, Place, And Person] : oriented to person, place, and time [Affect] : the affect was normal [Mood] : the mood was normal [No Anxiety] : not feeling anxious [5th Left ICS - MCL] : palpated at the 5th LICS in the midclavicular line [No Precordial Heave] : no precordial heave was noted [Normal Rate] : normal [Rhythm Regular] : regular [Normal S1] : normal S1 [Normal S2] : normal S2 [No Gallop] : no gallop heard [No Murmur] : no murmurs heard [No Pitting Edema] : no pitting edema present [Conjunctiva] : the conjunctiva were normal in both eyes [Normal] : the eyelids were normal bilaterally [PERRL] : pupils were equal in size, round, and reactive to light [EOM Intact] : extraocular movements were intact [Normal Jugular Venous V Waves Present] : normal jugular venous V waves present [2+] : left 2+ [Yellow Sclera (Icteric)] : no scleral icterus was seen [Right Carotid Bruit] : no bruit heard over the right carotid [Left Carotid Bruit] : no bruit heard over the left carotid [Bruit] : no bruit heard

## 2019-01-03 ENCOUNTER — APPOINTMENT (OUTPATIENT)
Dept: CARDIOLOGY | Facility: CLINIC | Age: 49
End: 2019-01-03
Payer: COMMERCIAL

## 2019-01-03 PROCEDURE — 93306 TTE W/DOPPLER COMPLETE: CPT

## 2019-01-07 ENCOUNTER — APPOINTMENT (OUTPATIENT)
Dept: OTOLARYNGOLOGY | Facility: CLINIC | Age: 49
End: 2019-01-07

## 2019-01-23 ENCOUNTER — RX RENEWAL (OUTPATIENT)
Age: 49
End: 2019-01-23

## 2019-02-04 ENCOUNTER — APPOINTMENT (OUTPATIENT)
Dept: INTERNAL MEDICINE | Facility: CLINIC | Age: 49
End: 2019-02-04
Payer: MEDICARE

## 2019-02-04 VITALS
SYSTOLIC BLOOD PRESSURE: 147 MMHG | WEIGHT: 138 LBS | HEART RATE: 71 BPM | DIASTOLIC BLOOD PRESSURE: 80 MMHG | HEIGHT: 70 IN | TEMPERATURE: 98.4 F | OXYGEN SATURATION: 96 % | BODY MASS INDEX: 19.76 KG/M2 | RESPIRATION RATE: 17 BRPM

## 2019-02-04 PROCEDURE — 99396 PREV VISIT EST AGE 40-64: CPT

## 2019-02-04 NOTE — HEALTH RISK ASSESSMENT
[Fair] : ~his/her~ current health as fair  [Good] : ~his/her~  mood as  good [No falls in past year] : Patient reported no falls in the past year [0] : 2) Feeling down, depressed, or hopeless: Not at all (0) [Fully functional (bathing, dressing, toileting, transferring, walking, feeding)] : Fully functional (bathing, dressing, toileting, transferring, walking, feeding) [Fully functional (using the telephone, shopping, preparing meals, housekeeping, doing laundry, using] : Fully functional and needs no help or supervision to perform IADLs (using the telephone, shopping, preparing meals, housekeeping, doing laundry, using transportation, managing medications and managing finances)

## 2019-02-04 NOTE — ASSESSMENT
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 essential HTN\par -con't current\par -f/u with nephrologist for BP med adjustment\par -adherence stressed \par \par 2. ESRD\par - on HD T/Th/Sat via L arm fistula since Aug 2013 (at [facility], followed by nephrologist - Xu Quezada)\par -con't sodium bicard TID, Renagel\par -evaluated for kidney transplant\par -renal diet\par \par 3. gout\par -no recent exacerbations\par \par 4. anemia\par -likely AOCD\par -con't to monitor CBC\par \par 5. Anxiety\par -refill Xanax PRN\par \par HCM as above\par \par RTC 3-4 mos

## 2019-02-04 NOTE — HISTORY OF PRESENT ILLNESS
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

## 2019-02-15 ENCOUNTER — MEDICATION RENEWAL (OUTPATIENT)
Age: 49
End: 2019-02-15

## 2019-03-04 NOTE — DISCUSSION/SUMMARY
[Home] : patient was discharged to home [Med Rec Performed] : med rec performed [FreeTextEntry1] : Patient fell in kitchen, fractured 2 ribs and left clavicle, has follow up with ortho, d/c with pain medication, states he is doing better.\par

## 2019-03-18 ENCOUNTER — APPOINTMENT (OUTPATIENT)
Dept: INTERNAL MEDICINE | Facility: CLINIC | Age: 49
End: 2019-03-18
Payer: MEDICARE

## 2019-03-18 VITALS
WEIGHT: 139 LBS | HEART RATE: 65 BPM | OXYGEN SATURATION: 99 % | DIASTOLIC BLOOD PRESSURE: 80 MMHG | SYSTOLIC BLOOD PRESSURE: 148 MMHG | RESPIRATION RATE: 17 BRPM | BODY MASS INDEX: 19.9 KG/M2 | HEIGHT: 70 IN | TEMPERATURE: 98.4 F

## 2019-03-18 PROCEDURE — 99495 TRANSJ CARE MGMT MOD F2F 14D: CPT

## 2019-04-10 ENCOUNTER — MEDICATION RENEWAL (OUTPATIENT)
Age: 49
End: 2019-04-10

## 2019-05-01 ENCOUNTER — APPOINTMENT (OUTPATIENT)
Dept: INTERNAL MEDICINE | Facility: CLINIC | Age: 49
End: 2019-05-01

## 2019-07-08 ENCOUNTER — MEDICATION RENEWAL (OUTPATIENT)
Age: 49
End: 2019-07-08

## 2019-08-05 ENCOUNTER — RX RENEWAL (OUTPATIENT)
Age: 49
End: 2019-08-05

## 2019-08-21 ENCOUNTER — RX RENEWAL (OUTPATIENT)
Age: 49
End: 2019-08-21

## 2019-09-16 ENCOUNTER — RX RENEWAL (OUTPATIENT)
Age: 49
End: 2019-09-16

## 2019-09-17 ENCOUNTER — MEDICATION RENEWAL (OUTPATIENT)
Age: 49
End: 2019-09-17

## 2019-09-27 ENCOUNTER — OUTPATIENT (OUTPATIENT)
Dept: OUTPATIENT SERVICES | Facility: HOSPITAL | Age: 49
LOS: 1 days | Discharge: HOME | End: 2019-09-27

## 2019-09-27 DIAGNOSIS — C25.9 MALIGNANT NEOPLASM OF PANCREAS, UNSPECIFIED: ICD-10-CM

## 2019-09-27 DIAGNOSIS — L02.31 CUTANEOUS ABSCESS OF BUTTOCK: Chronic | ICD-10-CM

## 2019-09-27 DIAGNOSIS — N18.6 END STAGE RENAL DISEASE: Chronic | ICD-10-CM

## 2019-09-27 DIAGNOSIS — Z98.89 OTHER SPECIFIED POSTPROCEDURAL STATES: Chronic | ICD-10-CM

## 2019-09-27 DIAGNOSIS — T83.410S BREAKDOWN (MECHANICAL) OF IMPLANTED PENILE PROSTHESIS, SEQUELA: Chronic | ICD-10-CM

## 2019-09-27 DIAGNOSIS — I77.0 ARTERIOVENOUS FISTULA, ACQUIRED: Chronic | ICD-10-CM

## 2019-10-01 ENCOUNTER — APPOINTMENT (OUTPATIENT)
Dept: HEMATOLOGY ONCOLOGY | Facility: CLINIC | Age: 49
End: 2019-10-01
Payer: MEDICARE

## 2019-10-01 VITALS
BODY MASS INDEX: 20.15 KG/M2 | TEMPERATURE: 98.3 F | RESPIRATION RATE: 16 BRPM | DIASTOLIC BLOOD PRESSURE: 77 MMHG | OXYGEN SATURATION: 97 % | WEIGHT: 140.41 LBS | SYSTOLIC BLOOD PRESSURE: 134 MMHG | HEART RATE: 66 BPM

## 2019-10-01 PROCEDURE — 99215 OFFICE O/P EST HI 40 MIN: CPT

## 2019-10-01 NOTE — REVIEW OF SYSTEMS
[Negative] : Allergic/Immunologic [FreeTextEntry3] : pre-glaucoma [FreeTextEntry5] : Just had a negative stress test. [FreeTextEntry8] : small amounts of urine daily; hemodialysis 3x/week. [FreeTextEntry9] : chronic pain in knees in and hands from RA.

## 2019-10-01 NOTE — PHYSICAL EXAM
[Ambulatory and capable of all self care but unable to carry out any work activities] : Status 2- Ambulatory and capable of all self care but unable to carry out any work activities. Up and about more than 50% of waking hours [Thin] : thin [Normal] : grossly intact [de-identified] : AV fistula on the left arm for dialysis. [de-identified] : surgical incision sites from robotic pancreas surgery are well healed. [de-identified] : sequelae of Rheumatoid Arthritis on hands with tendon contractures; middle toe of right foot is tender and not swollen due to recent trauma. [de-identified] : bilateral lower extremity scarring from prior skin itch several years ago

## 2019-10-01 NOTE — HISTORY OF PRESENT ILLNESS
[Date: ____________] : Patient's last distress assessment performed on [unfilled]. [0 - No Distress] : Distress Level: 0 [Patient given social work contact information and resource sheet] : Patient was given social work contact information and resource sheet [de-identified] : Patient is a 48 year old male with PMH ESRD on HD T/Th/Sat via L arm fistula since Aug 2013 (at [facility], followed by Nephrologist - Dr. 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 He was found to have a 2 cm low grade, neuroendocrine tumor found on CT abdomen and pelvis at [facility] after he presented with abdominal pain in May 2017. He was assessed by surgery with recommendations for robotic laparoscopic pancreatectomy and splenectomy and underwent surgery with Dr. Thiago Young on 2/9/2018.\par Pathology was sent of the pancreatic mass which demonstrated neuroendocrine tumor of the pancreas, well differentiated (NET G1), 2.2 cm in greatest dimension confined by the pancreas.  Mr Zeng is on the Pre-Transplant list and need to further evaluated due to his diagnoses of Neuroendocrine tumor of the pancreas by oncology.\par \par 10/1/19: Patient is here for his annual follow up. There is consideration for renal transplant in the near future. His ranking on the wait list has improved considerably. In 3/2019 patient had an accidental mechanical fall in his kitchen and fractured his left clavicle.  Patient was taken to [facility] on 2/27/19 and discharge on 3/1/2019. ESRD on HD: Patient continues to have HD on T, R, S.  [de-identified] : Transplant Nephrology: Chuy Lowery\par PCP: Silvia Cosby; [phone]\par Nephrology Treatment Center: [facility]; sees Dr. Denys Chang.\par Vascular: Francisco Shaw; [phone]\par Cardiology: Roman Goodson [phone]\par \par Patient's cell [phone]

## 2019-10-01 NOTE — CONSULT LETTER
[Dear  ___] : Dear  [unfilled], [Consult Letter:] : I had the pleasure of evaluating your patient, [unfilled]. [Please see my note below.] : Please see my note below. [Sincerely,] : Sincerely, [DrTony  ___] : Dr. GARCIA [DrTony ___] : Dr. GARCIA

## 2019-10-19 ENCOUNTER — RX RENEWAL (OUTPATIENT)
Age: 49
End: 2019-10-19

## 2019-10-24 ENCOUNTER — FORM ENCOUNTER (OUTPATIENT)
Age: 49
End: 2019-10-24

## 2019-10-25 ENCOUNTER — APPOINTMENT (OUTPATIENT)
Dept: NUCLEAR MEDICINE | Facility: IMAGING CENTER | Age: 49
End: 2019-10-25
Payer: MEDICARE

## 2019-10-25 ENCOUNTER — APPOINTMENT (OUTPATIENT)
Dept: MRI IMAGING | Facility: IMAGING CENTER | Age: 49
End: 2019-10-25
Payer: MEDICARE

## 2019-10-25 ENCOUNTER — OUTPATIENT (OUTPATIENT)
Dept: OUTPATIENT SERVICES | Facility: HOSPITAL | Age: 49
LOS: 1 days | End: 2019-10-25
Payer: COMMERCIAL

## 2019-10-25 DIAGNOSIS — T83.410S BREAKDOWN (MECHANICAL) OF IMPLANTED PENILE PROSTHESIS, SEQUELA: Chronic | ICD-10-CM

## 2019-10-25 DIAGNOSIS — L02.31 CUTANEOUS ABSCESS OF BUTTOCK: Chronic | ICD-10-CM

## 2019-10-25 DIAGNOSIS — D3A.8 OTHER BENIGN NEUROENDOCRINE TUMORS: ICD-10-CM

## 2019-10-25 DIAGNOSIS — Z98.89 OTHER SPECIFIED POSTPROCEDURAL STATES: Chronic | ICD-10-CM

## 2019-10-25 DIAGNOSIS — I77.0 ARTERIOVENOUS FISTULA, ACQUIRED: Chronic | ICD-10-CM

## 2019-10-25 DIAGNOSIS — N18.6 END STAGE RENAL DISEASE: Chronic | ICD-10-CM

## 2019-10-25 PROCEDURE — 78815 PET IMAGE W/CT SKULL-THIGH: CPT

## 2019-10-25 PROCEDURE — 72195 MRI PELVIS W/O DYE: CPT

## 2019-10-25 PROCEDURE — 78815 PET IMAGE W/CT SKULL-THIGH: CPT | Mod: 26,PS

## 2019-10-25 PROCEDURE — 72195 MRI PELVIS W/O DYE: CPT | Mod: 26

## 2019-10-25 PROCEDURE — A9587: CPT

## 2019-11-13 ENCOUNTER — APPOINTMENT (OUTPATIENT)
Dept: TRANSPLANT | Facility: CLINIC | Age: 49
End: 2019-11-13

## 2019-11-13 ENCOUNTER — APPOINTMENT (OUTPATIENT)
Dept: NEPHROLOGY | Facility: CLINIC | Age: 49
End: 2019-11-13

## 2019-11-14 ENCOUNTER — RX RENEWAL (OUTPATIENT)
Age: 49
End: 2019-11-14

## 2019-12-06 ENCOUNTER — APPOINTMENT (OUTPATIENT)
Dept: TRANSPLANT | Facility: CLINIC | Age: 49
End: 2019-12-06
Payer: COMMERCIAL

## 2019-12-06 ENCOUNTER — LABORATORY RESULT (OUTPATIENT)
Age: 49
End: 2019-12-06

## 2019-12-06 VITALS
HEART RATE: 68 BPM | TEMPERATURE: 98.6 F | RESPIRATION RATE: 16 BRPM | BODY MASS INDEX: 20.33 KG/M2 | OXYGEN SATURATION: 96 % | WEIGHT: 142 LBS | DIASTOLIC BLOOD PRESSURE: 64 MMHG | SYSTOLIC BLOOD PRESSURE: 114 MMHG | HEIGHT: 70 IN

## 2019-12-06 PROCEDURE — 99205 OFFICE O/P NEW HI 60 MIN: CPT

## 2019-12-07 ENCOUNTER — TRANSCRIPTION ENCOUNTER (OUTPATIENT)
Age: 49
End: 2019-12-07

## 2019-12-07 NOTE — ASSESSMENT
[FreeTextEntry1] : 1)49 year old AA male, ESRD on HD, tolerating well. Remains a suitable candidate for renal transplant\par 2) Neuroendocrine tumor of the pancreas. Recent PET - no evidence of recurrence/metastatic disease, s/b Oncologist Luis Raphael - risk of recurrence is much lower than risk on hemodialysis..\par \par 10/1/19 Dr Raphael Risk-Benefit analysis: \par     The risks to the patient of continuing hemodialysis and not performing renal transplant is\par     much higher than the risk of recurrence of the pancreas neuroendocrine tumor (PNET).\par     Therefore, I recommend to move forward with planning and performance of renal\par     transplant when a kidney donor becomes available.\par \par 3)Hypertension: presents normotensive today. Continue follow up with primary physicians.\par 4)Cardiac risk: Reviewed recent testing. Will refer to Cardiology for updated testing and clearance. \par 5)Cancer screening: Pt reports completing a colonoscopy at [facility]  7/2017. Will task ASA to retrieve the results. Reviewed the endoscopy results on file. \par \par Reported off to: Ashley BLANCAS and Sarah GARCIA\par 	\par Patient must complete: cardiac clearance, abd sono w/ dopplers, obtain colonoscopy results. .\par Donor coordinator contact information given to patient	\par KDPI >85%: Yes\par CDC high risk: Yes\par Hep C Kidney: Yes Pt financially cleared and status changed on UNOS. \par A2B NA\par \par \par I have personally discussed the risks and benefits of transplantation where the following was disclosed:\par  \par Reviewed factors affecting survival and morbidity while on dialysis, the transplant wait list and reviewed carlos-operative and long-term risk factors affecting outcome in kidney transplantation. \par  \par One year SRTR outcomes for national and [facility] were discussed in regards to patient survival and graft survival after transplantation. \par  \par Details of transplant surgery, including complications were discussed.\par Immunosuppression and complications including infection including life threatening sepsis and opportunistic infections, malignancy and new onset diabetes were discussed. \par  \par Benefits of live donor transplantation as well as variability in wait times across regions and multiple listing were discussed. \par KDPI >85% and PHS high risk criteria donors were discussed. \par HCV kidney transplantation was discussed.\par  \par Will proceed with completing/ updating work up and listing for transplant/ live donor transplant once work up is reviewed and found to be acceptable by multidisciplinary listing committee.\par \par  \par

## 2019-12-07 NOTE — PHYSICAL EXAM
[General Appearance - Alert] : alert [General Appearance - In No Acute Distress] : in no acute distress [Auscultation Breath Sounds / Voice Sounds] : lungs were clear to auscultation bilaterally [Heart Sounds Gallop] : no gallops [Heart Sounds Pericardial Friction Rub] : no pericardial rub [Full Pulse] : the pedal pulses are present [Abdomen Tenderness] : non-tender [Cervical Lymph Nodes Enlarged Posterior Bilaterally] : posterior cervical [Cervical Lymph Nodes Enlarged Anterior Bilaterally] : anterior cervical [Inguinal Lymph Nodes Enlarged Bilaterally] : inguinal [Supraclavicular Lymph Nodes Enlarged Bilaterally] : supraclavicular [Axillary Lymph Nodes Enlarged Bilaterally] : axillary [Involuntary Movements] : no involuntary movements were seen [___ (cm) Fistula] : [unfilled] (cm) fistula [Bruit] : a bruit was present [Thrill] : a thrill was present [No Focal Deficits] : no focal deficits [Oriented To Time, Place, And Person] : oriented to person, place, and time [Impaired Insight] : insight and judgment were intact [Affect] : the affect was normal [Normal] : normal [Scars] : scars [Soft] : soft [] : right dorsalis pedis palpable [TextBox_34] : sclerodactly, calcinosis,  [FreeTextEntry1] : deformities of hands from arthritis noted

## 2019-12-07 NOTE — HISTORY OF PRESENT ILLNESS
[FreeTextEntry1] : Patient has known CKD (), ESRD () on follow up with  is here for pre kidney transplant evaluation. \par He has no known DM; Has HTN \par No known h/o active CAD/CVA/PVD/DVT/active infections/bleeding.\par Past surgeries: Left hip fracture 1 year; Knee surgery 5 years\par Had 2.2 cm neuroendocrine tumor of pancreas removed at [facility] (Dr. Dick, laparoscopic surgery).  \par Pathology:  low grade NET G1, well differentiated , 2.2cm, (oncologist Luis Raphael:  risk of hemodialysis greater than recurrence risk of low grade pNET).\par \par Follows with Dr. Goodson for cardiology care.\par Non smoker.\par Independent for ADL.\par Has deforming arthritis of both hands. Sclerodactyli\par Able to walk one block, can climb stairs with difficulty. 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 \par Radiology\par Chest Xray  clear lungs\par CT abd pelvis without contrast.7/10/15 Organs WNL, Vasculature assessment limited due to lack of contrast. Penile implant is noted. There is a heterogeneous, \par predominantly high density collection within the scrotum measuring 12.0 x \par 12.4 x 8.0 cm consistent with hematoma.\par RETROPERITONEUM: There is a heterogeneous, predominantly high density \par fluid collection tracking into the right retroperitoneum, also consistent \par with hematoma. It measures 4.8 x 2.6 x 7.3 cm. Foci of air are noted in \par the right retroperitoneum. \par ABDOMINAL WALL: There are several small areas of high attenuation in the \par right groin region which likely represent hematoma. There are foci of \par subcutaneous air in the right groin region, status post recent removal of \par implant reservoir consistent with history provided by the urology team.\par \par MOst recent MR 10/25//19 Shows no interval change. 1.4.cm lesion in the right posterior illum which again is favored to reflect benign origin, although may warrant additional follow-up given abnormally uptake on previous PET CT scan. \par \par PET scan skull to thigh 10/25/19 no evidence of somatostatin receptor bearing lesion. Stable post surgical changes of distal pancreatectomy and splenectomy. Mild tracer avidity a the site of the sclerotic lesion right iliac bone with remains unchanged in size dating back to CT done 2015. \par Minimal tracer avidity involving interval fractures of the left distal clavicle, base of the left coracoid and multiple healed left second through sixth left anterior ribs consistent with known trauma\par \par Last Seen 18\par Listed 14\par Blood type A

## 2019-12-08 ENCOUNTER — INPATIENT (INPATIENT)
Facility: HOSPITAL | Age: 49
LOS: 11 days | Discharge: HOME | DRG: 652 | End: 2019-12-20
Attending: TRANSPLANT SURGERY | Admitting: TRANSPLANT SURGERY
Payer: COMMERCIAL

## 2019-12-08 VITALS
SYSTOLIC BLOOD PRESSURE: 128 MMHG | WEIGHT: 141.32 LBS | OXYGEN SATURATION: 100 % | RESPIRATION RATE: 16 BRPM | DIASTOLIC BLOOD PRESSURE: 71 MMHG | TEMPERATURE: 99 F | HEIGHT: 71 IN | HEART RATE: 71 BPM

## 2019-12-08 DIAGNOSIS — N18.6 END STAGE RENAL DISEASE: Chronic | ICD-10-CM

## 2019-12-08 DIAGNOSIS — Z94.0 KIDNEY TRANSPLANT STATUS: ICD-10-CM

## 2019-12-08 DIAGNOSIS — I10 ESSENTIAL (PRIMARY) HYPERTENSION: ICD-10-CM

## 2019-12-08 DIAGNOSIS — Z98.89 OTHER SPECIFIED POSTPROCEDURAL STATES: Chronic | ICD-10-CM

## 2019-12-08 DIAGNOSIS — N18.6 END STAGE RENAL DISEASE: ICD-10-CM

## 2019-12-08 DIAGNOSIS — I77.0 ARTERIOVENOUS FISTULA, ACQUIRED: Chronic | ICD-10-CM

## 2019-12-08 DIAGNOSIS — L02.31 CUTANEOUS ABSCESS OF BUTTOCK: Chronic | ICD-10-CM

## 2019-12-08 DIAGNOSIS — T83.410S BREAKDOWN (MECHANICAL) OF IMPLANTED PENILE PROSTHESIS, SEQUELA: Chronic | ICD-10-CM

## 2019-12-08 LAB
ALBUMIN SERPL ELPH-MCNC: 3.1 G/DL — LOW (ref 3.3–5)
ALBUMIN SERPL ELPH-MCNC: 4 G/DL — SIGNIFICANT CHANGE UP (ref 3.3–5)
ALP SERPL-CCNC: 101 U/L — SIGNIFICANT CHANGE UP (ref 40–120)
ALP SERPL-CCNC: 127 U/L — HIGH (ref 40–120)
ALT FLD-CCNC: 14 U/L — SIGNIFICANT CHANGE UP (ref 10–45)
ALT FLD-CCNC: 18 U/L — SIGNIFICANT CHANGE UP (ref 10–45)
ANION GAP SERPL CALC-SCNC: 18 MMOL/L — HIGH (ref 5–17)
ANION GAP SERPL CALC-SCNC: 23 MMOL/L — HIGH (ref 5–17)
APTT BLD: 28.7 SEC — SIGNIFICANT CHANGE UP (ref 27.5–36.3)
AST SERPL-CCNC: 31 U/L — SIGNIFICANT CHANGE UP (ref 10–40)
AST SERPL-CCNC: 35 U/L — SIGNIFICANT CHANGE UP (ref 10–40)
BASE EXCESS BLDV CALC-SCNC: 4 MMOL/L — HIGH (ref -2–2)
BASE EXCESS BLDV CALC-SCNC: 8.5 MMOL/L — HIGH (ref -2–2)
BILIRUB SERPL-MCNC: 0.7 MG/DL — SIGNIFICANT CHANGE UP (ref 0.2–1.2)
BILIRUB SERPL-MCNC: 1 MG/DL — SIGNIFICANT CHANGE UP (ref 0.2–1.2)
BLD GP AB SCN SERPL QL: NEGATIVE — SIGNIFICANT CHANGE UP
BUN SERPL-MCNC: 13 MG/DL — SIGNIFICANT CHANGE UP (ref 7–23)
BUN SERPL-MCNC: 14 MG/DL — SIGNIFICANT CHANGE UP (ref 7–23)
CA-I SERPL-SCNC: 1.06 MMOL/L — LOW (ref 1.12–1.3)
CA-I SERPL-SCNC: 1.07 MMOL/L — LOW (ref 1.12–1.3)
CALCIUM SERPL-MCNC: 7.6 MG/DL — LOW (ref 8.4–10.5)
CALCIUM SERPL-MCNC: 8.9 MG/DL — SIGNIFICANT CHANGE UP (ref 8.4–10.5)
CHLORIDE BLDV-SCNC: 94 MMOL/L — LOW (ref 96–108)
CHLORIDE BLDV-SCNC: 94 MMOL/L — LOW (ref 96–108)
CHLORIDE SERPL-SCNC: 90 MMOL/L — LOW (ref 96–108)
CHLORIDE SERPL-SCNC: 93 MMOL/L — LOW (ref 96–108)
CO2 BLDV-SCNC: 29 MMOL/L — SIGNIFICANT CHANGE UP (ref 22–30)
CO2 BLDV-SCNC: 33 MMOL/L — HIGH (ref 22–30)
CO2 SERPL-SCNC: 25 MMOL/L — SIGNIFICANT CHANGE UP (ref 22–31)
CO2 SERPL-SCNC: 25 MMOL/L — SIGNIFICANT CHANGE UP (ref 22–31)
CREAT SERPL-MCNC: 5.53 MG/DL — HIGH (ref 0.5–1.3)
CREAT SERPL-MCNC: 5.73 MG/DL — HIGH (ref 0.5–1.3)
GAS PNL BLDA: SIGNIFICANT CHANGE UP
GAS PNL BLDV: 136 MMOL/L — SIGNIFICANT CHANGE UP (ref 135–145)
GAS PNL BLDV: 138 MMOL/L — SIGNIFICANT CHANGE UP (ref 135–145)
GAS PNL BLDV: SIGNIFICANT CHANGE UP
GLUCOSE BLDV-MCNC: 79 MG/DL — SIGNIFICANT CHANGE UP (ref 70–99)
GLUCOSE BLDV-MCNC: 81 MG/DL — SIGNIFICANT CHANGE UP (ref 70–99)
GLUCOSE SERPL-MCNC: 159 MG/DL — HIGH (ref 70–99)
GLUCOSE SERPL-MCNC: 72 MG/DL — SIGNIFICANT CHANGE UP (ref 70–99)
HCO3 BLDV-SCNC: 28 MMOL/L — SIGNIFICANT CHANGE UP (ref 21–29)
HCO3 BLDV-SCNC: 32 MMOL/L — HIGH (ref 21–29)
HCT VFR BLD CALC: 19 % — CRITICAL LOW (ref 39–50)
HCT VFR BLD CALC: 19.6 % — CRITICAL LOW (ref 39–50)
HCT VFR BLD CALC: 25.4 % — LOW (ref 39–50)
HCT VFR BLDA CALC: 25 % — LOW (ref 39–50)
HCT VFR BLDA CALC: 28 % — LOW (ref 39–50)
HGB BLD CALC-MCNC: 8.1 G/DL — LOW (ref 13–17)
HGB BLD CALC-MCNC: 9 G/DL — LOW (ref 13–17)
HGB BLD-MCNC: 6.3 G/DL — CRITICAL LOW (ref 13–17)
HGB BLD-MCNC: 6.4 G/DL — CRITICAL LOW (ref 13–17)
HGB BLD-MCNC: 8.5 G/DL — LOW (ref 13–17)
INR BLD: 0.98 RATIO — SIGNIFICANT CHANGE UP (ref 0.88–1.16)
LACTATE BLDV-MCNC: 1.3 MMOL/L — SIGNIFICANT CHANGE UP (ref 0.7–2)
LACTATE BLDV-MCNC: 5.8 MMOL/L — CRITICAL HIGH (ref 0.7–2)
MAGNESIUM SERPL-MCNC: 1.8 MG/DL — SIGNIFICANT CHANGE UP (ref 1.6–2.6)
MCHC RBC-ENTMCNC: 32.7 GM/DL — SIGNIFICANT CHANGE UP (ref 32–36)
MCHC RBC-ENTMCNC: 33.2 GM/DL — SIGNIFICANT CHANGE UP (ref 32–36)
MCHC RBC-ENTMCNC: 33.5 GM/DL — SIGNIFICANT CHANGE UP (ref 32–36)
MCHC RBC-ENTMCNC: 35.2 PG — HIGH (ref 27–34)
MCHC RBC-ENTMCNC: 35.4 PG — HIGH (ref 27–34)
MCHC RBC-ENTMCNC: 35.4 PG — HIGH (ref 27–34)
MCV RBC AUTO: 105.8 FL — HIGH (ref 80–100)
MCV RBC AUTO: 106.7 FL — HIGH (ref 80–100)
MCV RBC AUTO: 107.7 FL — HIGH (ref 80–100)
NRBC # BLD: 0 /100 WBCS — SIGNIFICANT CHANGE UP (ref 0–0)
NRBC # BLD: 0 /100 WBCS — SIGNIFICANT CHANGE UP (ref 0–0)
OTHER CELLS CSF MANUAL: 11 ML/DL — LOW (ref 18–22)
OTHER CELLS CSF MANUAL: 6 ML/DL — LOW (ref 18–22)
PCO2 BLDV: 38 MMHG — SIGNIFICANT CHANGE UP (ref 35–50)
PCO2 BLDV: 40 MMHG — SIGNIFICANT CHANGE UP (ref 35–50)
PH BLDV: 7.45 — SIGNIFICANT CHANGE UP (ref 7.35–7.45)
PH BLDV: 7.53 — HIGH (ref 7.35–7.45)
PHOSPHATE SERPL-MCNC: 2.9 MG/DL — SIGNIFICANT CHANGE UP (ref 2.5–4.5)
PLATELET # BLD AUTO: 182 K/UL — SIGNIFICANT CHANGE UP (ref 150–400)
PLATELET # BLD AUTO: 184 K/UL — SIGNIFICANT CHANGE UP (ref 150–400)
PLATELET # BLD AUTO: 238 K/UL — SIGNIFICANT CHANGE UP (ref 150–400)
PO2 BLDV: 31 MMHG — SIGNIFICANT CHANGE UP (ref 25–45)
PO2 BLDV: 81 MMHG — HIGH (ref 25–45)
POTASSIUM BLDV-SCNC: 2.9 MMOL/L — CRITICAL LOW (ref 3.5–5.3)
POTASSIUM BLDV-SCNC: 3.4 MMOL/L — LOW (ref 3.5–5.3)
POTASSIUM SERPL-MCNC: 3.3 MMOL/L — LOW (ref 3.5–5.3)
POTASSIUM SERPL-MCNC: 3.7 MMOL/L — SIGNIFICANT CHANGE UP (ref 3.5–5.3)
POTASSIUM SERPL-SCNC: 3.3 MMOL/L — LOW (ref 3.5–5.3)
POTASSIUM SERPL-SCNC: 3.7 MMOL/L — SIGNIFICANT CHANGE UP (ref 3.5–5.3)
PROT SERPL-MCNC: 6.1 G/DL — SIGNIFICANT CHANGE UP (ref 6–8.3)
PROT SERPL-MCNC: 7.6 G/DL — SIGNIFICANT CHANGE UP (ref 6–8.3)
PROTHROM AB SERPL-ACNC: 11.3 SEC — SIGNIFICANT CHANGE UP (ref 10–12.9)
RBC # BLD: 1.78 M/UL — LOW (ref 4.2–5.8)
RBC # BLD: 1.82 M/UL — LOW (ref 4.2–5.8)
RBC # BLD: 2.4 M/UL — LOW (ref 4.2–5.8)
RBC # FLD: 17.1 % — HIGH (ref 10.3–14.5)
RBC # FLD: 17.3 % — HIGH (ref 10.3–14.5)
RBC # FLD: 17.3 % — HIGH (ref 10.3–14.5)
RH IG SCN BLD-IMP: POSITIVE — SIGNIFICANT CHANGE UP
SAO2 % BLDV: 53 % — LOW (ref 67–88)
SAO2 % BLDV: 97 % — HIGH (ref 67–88)
SODIUM SERPL-SCNC: 136 MMOL/L — SIGNIFICANT CHANGE UP (ref 135–145)
SODIUM SERPL-SCNC: 138 MMOL/L — SIGNIFICANT CHANGE UP (ref 135–145)
WBC # BLD: 7.19 K/UL — SIGNIFICANT CHANGE UP (ref 3.8–10.5)
WBC # BLD: 9.19 K/UL — SIGNIFICANT CHANGE UP (ref 3.8–10.5)
WBC # BLD: 9.78 K/UL — SIGNIFICANT CHANGE UP (ref 3.8–10.5)
WBC # FLD AUTO: 7.19 K/UL — SIGNIFICANT CHANGE UP (ref 3.8–10.5)
WBC # FLD AUTO: 9.19 K/UL — SIGNIFICANT CHANGE UP (ref 3.8–10.5)
WBC # FLD AUTO: 9.78 K/UL — SIGNIFICANT CHANGE UP (ref 3.8–10.5)

## 2019-12-08 PROCEDURE — 76776 US EXAM K TRANSPL W/DOPPLER: CPT | Mod: 26,RT

## 2019-12-08 PROCEDURE — 93010 ELECTROCARDIOGRAM REPORT: CPT | Mod: 76

## 2019-12-08 PROCEDURE — 50605 INSERT URETERAL SUPPORT: CPT | Mod: RT

## 2019-12-08 PROCEDURE — 50360 RNL ALTRNSPLJ W/O RCP NFRCT: CPT

## 2019-12-08 PROCEDURE — 99222 1ST HOSP IP/OBS MODERATE 55: CPT

## 2019-12-08 PROCEDURE — 71045 X-RAY EXAM CHEST 1 VIEW: CPT | Mod: 26

## 2019-12-08 PROCEDURE — 71045 X-RAY EXAM CHEST 1 VIEW: CPT | Mod: 26,77

## 2019-12-08 RX ORDER — CEFAZOLIN SODIUM 1 G
2000 VIAL (EA) INJECTION ONCE
Refills: 0 | Status: DISCONTINUED | OUTPATIENT
Start: 2019-12-08 | End: 2019-12-09

## 2019-12-08 RX ORDER — BASILIXIMAB 20 MG/5ML
20 INJECTION, POWDER, FOR SOLUTION INTRAVENOUS ONCE
Refills: 0 | Status: DISCONTINUED | OUTPATIENT
Start: 2019-12-08 | End: 2019-12-09

## 2019-12-08 RX ORDER — SODIUM CHLORIDE 9 MG/ML
3 INJECTION INTRAMUSCULAR; INTRAVENOUS; SUBCUTANEOUS EVERY 8 HOURS
Refills: 0 | Status: DISCONTINUED | OUTPATIENT
Start: 2019-12-08 | End: 2019-12-20

## 2019-12-08 RX ORDER — ONDANSETRON 8 MG/1
4 TABLET, FILM COATED ORAL EVERY 6 HOURS
Refills: 0 | Status: DISCONTINUED | OUTPATIENT
Start: 2019-12-08 | End: 2019-12-20

## 2019-12-08 RX ORDER — MYCOPHENOLATE MOFETIL 250 MG/1
1 CAPSULE ORAL EVERY 12 HOURS
Refills: 0 | Status: DISCONTINUED | OUTPATIENT
Start: 2019-12-09 | End: 2019-12-20

## 2019-12-08 RX ORDER — SODIUM CHLORIDE 9 MG/ML
1000 INJECTION INTRAMUSCULAR; INTRAVENOUS; SUBCUTANEOUS
Refills: 0 | Status: DISCONTINUED | OUTPATIENT
Start: 2019-12-08 | End: 2019-12-09

## 2019-12-08 RX ORDER — CHLORHEXIDINE GLUCONATE 213 G/1000ML
1 SOLUTION TOPICAL
Refills: 0 | Status: DISCONTINUED | OUTPATIENT
Start: 2019-12-08 | End: 2019-12-20

## 2019-12-08 RX ORDER — VALGANCICLOVIR 450 MG/1
450 TABLET, FILM COATED ORAL DAILY
Refills: 0 | Status: DISCONTINUED | OUTPATIENT
Start: 2019-12-09 | End: 2019-12-11

## 2019-12-08 RX ORDER — ONDANSETRON 8 MG/1
4 TABLET, FILM COATED ORAL ONCE
Refills: 0 | Status: COMPLETED | OUTPATIENT
Start: 2019-12-08 | End: 2019-12-08

## 2019-12-08 RX ORDER — HYDROMORPHONE HYDROCHLORIDE 2 MG/ML
0.5 INJECTION INTRAMUSCULAR; INTRAVENOUS; SUBCUTANEOUS
Refills: 0 | Status: DISCONTINUED | OUTPATIENT
Start: 2019-12-08 | End: 2019-12-09

## 2019-12-08 RX ORDER — SODIUM CHLORIDE 9 MG/ML
500 INJECTION, SOLUTION INTRAVENOUS
Refills: 0 | Status: DISCONTINUED | OUTPATIENT
Start: 2019-12-08 | End: 2019-12-09

## 2019-12-08 RX ORDER — TACROLIMUS 5 MG/1
5 CAPSULE ORAL DAILY
Refills: 0 | Status: DISCONTINUED | OUTPATIENT
Start: 2019-12-09 | End: 2019-12-10

## 2019-12-08 RX ORDER — BASILIXIMAB 20 MG/5ML
20 INJECTION, POWDER, FOR SOLUTION INTRAVENOUS ONCE
Refills: 0 | Status: COMPLETED | OUTPATIENT
Start: 2019-12-12 | End: 2019-12-12

## 2019-12-08 RX ORDER — NYSTATIN 500MM UNIT
500000 POWDER (EA) MISCELLANEOUS
Refills: 0 | Status: DISCONTINUED | OUTPATIENT
Start: 2019-12-09 | End: 2019-12-20

## 2019-12-08 RX ORDER — FAMOTIDINE 10 MG/ML
20 INJECTION INTRAVENOUS DAILY
Refills: 0 | Status: DISCONTINUED | OUTPATIENT
Start: 2019-12-09 | End: 2019-12-20

## 2019-12-08 RX ADMIN — SODIUM CHLORIDE 3 MILLILITER(S): 9 INJECTION INTRAMUSCULAR; INTRAVENOUS; SUBCUTANEOUS at 21:14

## 2019-12-08 RX ADMIN — SODIUM CHLORIDE 70 MILLILITER(S): 9 INJECTION INTRAMUSCULAR; INTRAVENOUS; SUBCUTANEOUS at 19:22

## 2019-12-08 RX ADMIN — HYDROMORPHONE HYDROCHLORIDE 0.5 MILLIGRAM(S): 2 INJECTION INTRAMUSCULAR; INTRAVENOUS; SUBCUTANEOUS at 21:20

## 2019-12-08 RX ADMIN — ONDANSETRON 4 MILLIGRAM(S): 8 TABLET, FILM COATED ORAL at 21:49

## 2019-12-08 RX ADMIN — HYDROMORPHONE HYDROCHLORIDE 0.5 MILLIGRAM(S): 2 INJECTION INTRAMUSCULAR; INTRAVENOUS; SUBCUTANEOUS at 21:07

## 2019-12-08 RX ADMIN — HYDROMORPHONE HYDROCHLORIDE 0.5 MILLIGRAM(S): 2 INJECTION INTRAMUSCULAR; INTRAVENOUS; SUBCUTANEOUS at 23:45

## 2019-12-08 RX ADMIN — HYDROMORPHONE HYDROCHLORIDE 0.5 MILLIGRAM(S): 2 INJECTION INTRAMUSCULAR; INTRAVENOUS; SUBCUTANEOUS at 20:30

## 2019-12-08 RX ADMIN — HYDROMORPHONE HYDROCHLORIDE 0.5 MILLIGRAM(S): 2 INJECTION INTRAMUSCULAR; INTRAVENOUS; SUBCUTANEOUS at 20:11

## 2019-12-08 NOTE — H&P ADULT - NSHPPHYSICALEXAM_GEN_ALL_CORE
Constitutional: Well developed / well nourished  Eyes: Anicteric, PERRLA  ENMT: nc/at  Neck: supple, no lymphadenopathy  Respiratory: CTA B/L  Cardiovascular: RRR  Gastrointestinal: Soft, ND/NT. well healed lap sites from previous sx  Genitourinary: Voiding spontaneously  Extremities: +RA to hands with contractures. LUE AVF palpable  Vascular: Palpable dp pulses bilaterally  Neurological: A&O x3  Skin:  no erythema and evidence of infection noted. chronic skin changes from ESRD  Musculoskeletal: Moving all extremities  Psychiatric: Responsive

## 2019-12-08 NOTE — PRE-ANESTHESIA EVALUATION ADULT - NSANTHOSAYNRD_GEN_A_CORE
No. TEDDY screening performed.  STOP BANG Legend: 0-2 = LOW Risk; 3-4 = INTERMEDIATE Risk; 5-8 = HIGH Risk

## 2019-12-08 NOTE — CONSULT NOTE ADULT - PROBLEM SELECTOR RECOMMENDATION 9
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 on TTS schedule last HD 12/07/19, completed session w/o complications.   Labs pending today. But no indication for HD. Will monitor after surgery for any indication.   monitor labs closely and uop

## 2019-12-08 NOTE — PROVIDER CONTACT NOTE (CRITICAL VALUE NOTIFICATION) - BACKGROUND
(Admit Diagnosis) Status post kidney transplant  (PMH) ESRD (end stage renal disease) on dialysis  (PMH) Erectile dysfunction  (PMH) Glaucoma

## 2019-12-08 NOTE — PRE-ANESTHESIA EVALUATION ADULT - BMI (KG/M2)
Subjective: (As above and below)     Chief Complaint   Patient presents with   [facility] Vaginal Discharge     ELVIE ATKINSONNicol Carreno is a 43y.o. year old female who presents for vaginal discharge. She states that she has been having yellow discharge x 2 days. It is light yellow. No dysuria, vaginal pain, lesions. She has had unprotected sex. She is also due for pap exam.      Reviewed PmHx, RxHx, FmHx, SocHx, AllgHx and updated in chart. Family History   Problem Relation Age of Onset    Diabetes Mother     Depression Mother     Asthma Brother     Stroke Maternal Grandmother      aneurysm    Cancer Maternal Grandmother      kidney    Hypertension Maternal Grandmother     Cancer Paternal Grandmother      lung    Diabetes Brother     Bipolar Disorder Brother     Schizophrenia Brother     Other Brother      paranoia       Past Medical History:   Diagnosis Date    Anxiety     Bipolar affective [facility] [address]     Depression     Diabetes ([facility] [address]     diet control    Hyperlipidemia     High Cholesterol    Hyperlipidemia     Major depression       Social History     Social History    Marital status: SINGLE     Spouse name: N/A    Number of children: N/A    Years of education: N/A     Social History Main Topics    Smoking status: Former Smoker    Smokeless tobacco: Never Used      Comment: cigars    Alcohol use Yes      Comment: occasional    Drug use: No    Sexual activity: Yes     Partners: Male     Birth control/ protection: Injection     Other Topics Concern    None     Social History Narrative          Current Outpatient Prescriptions   Medication Sig    mirtazapine (REMERON) 15 mg tablet Take 0.5 Tabs by mouth nightly.  busPIRone (BUSPAR) 10 mg tablet Take 1 Tab by mouth two (2) times a day.  raNITIdine (ZANTAC) 150 mg tablet Take 1 Tab by mouth two (2) times a day.  Indications: HEARTBURN    docusate sodium (DULCOLAX STOOL SOFTENER, DSS,) 100 mg capsule Take 1 Cap by mouth two (2) times a day for 90 days.     No current facility-administered medications for this visit. Review of Systems:   Constitutional:    Negative for fever and chills, negative diaphoresis. Respiratory:        Negative for cough and shortness of breath. Cardiovascular:  Negative for chest pain and palpitations. Gastrointestinal: Negative for nausea, vomiting, abdominal pain, diarrhea or constipation. Genitourinary:     Negative for dysuria and frequency. Musculoskeletal: Negative for falls, tenderness and swelling. Skin:                    Negative for rash, masses or lesions. Neurological:       Negative for dizzyness, seizure, loss of consciousness, weakness and numbness. Objective:     Vitals:    06/01/17 1516   BP: 106/75   Pulse: 81   Resp: 20   Temp: 98.6 °F (37 °C)   TempSrc: Oral   SpO2: 99%   Weight: 155 lb 12.8 oz (70.7 kg)   Height: 5' 2\" (1.575 m)           Physical Examination: General appearance - alert, well appearing, and in no distress  Chest - clear to auscultation, no wheezes, rales or rhonchi, symmetric air entry  Heart - normal rate, regular rhythm, normal S1, S2, no murmurs, rubs, clicks or gallops  Pelvic - normal external genitalia, vulva, vagina, cervix, uterus and adnexa, PAP: Pap smear done today  She does have mod amount of light yellow discharge    Assessment/ Plan:   Follow-up Disposition:  Return if symptoms worsen or fail to improve. Advised no intercourse until diagnosis    1. Vaginal discharge    - NUSWAB VAGINITIS PLUS    2. Acute vaginitis    - NUSWAB VAGINITIS PLUS    3. Cervical cancer screening    - PAP IG, HPV AND RFX HPV 67/49,25([id number])        I have discussed the diagnosis with the patient and the intended plan as seen in the above orders. The patient has received an after-visit summary and questions were answered concerning future plans. Pt conveyed understanding of plan.       Medication Side Effects and Warnings were discussed with patient: yes  Patient Labs were reviewed: yes  Patient Past Records were reviewed:  yes    Prakash Bartlett.  Nrois Phelan NP 19.7

## 2019-12-08 NOTE — H&P ADULT - NSICDXPASTMEDICALHX_GEN_ALL_CORE_FT
PAST MEDICAL HISTORY:  Anemia     Brain cyst had MRI recently- now gone    Carpal tunnel syndrome     Contracture of finger joint From RA    Erectile dysfunction     ESRD (end stage renal disease) on dialysis since 7/2013 jarrett murry, sat    Gastric ulcer Long time ago    Glaucoma     Gout     HTN (hypertension)     Rheumatoid arthritis

## 2019-12-08 NOTE — H&P ADULT - NSICDXPASTSURGICALHX_GEN_ALL_CORE_FT
PAST SURGICAL HISTORY:  Abscess of left buttock s/p I & D    AV fistula placement left lower arm    Breakdown (mechanical) of penile (implanted) prosthesis, sequela     End-stage renal disease (ESRD) PERMACATH PLACEMENT    S/P cystoscopy stent placement & removal for kidney stones, lithiotripsy    s/p Lt Carpal tunnel 2011

## 2019-12-08 NOTE — H&P ADULT - NSICDXFAMILYHX_GEN_ALL_CORE_FT
FAMILY HISTORY:  Family history of hypertension in mother  Family history of myocardial infarction in first degree male relative  Family history of rheumatoid arthritis    Sibling  Still living? Yes, Estimated age: Age Unknown  Family history of cerebral aneurysm, Age at diagnosis: Age Unknown

## 2019-12-08 NOTE — PROVIDER CONTACT NOTE (CRITICAL VALUE NOTIFICATION) - ACTION/TREATMENT ORDERED:
MAEVE Sun made aware no orders received at this time.
drawing repeat labs at this time
provider ordering to give 2 units PRBCs. will continue to monitor

## 2019-12-08 NOTE — H&P ADULT - HISTORY OF PRESENT ILLNESS
49M with significant PMHx of ESRD on HD  (no bx, since 2013 with Dr. 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 Leigh, followed by Dr. Raphael, [facility] Oncologist), RA with hand contractures, presents for potential DDRT.    Last colonoscopy: clear 2017  Cardiac: cleared by Jennifer/Hamzah 2018  Onc: cleared by Donavon 2019 (low grade NET, risk of HD greater than recurrence of low grade NET, recent PET/MRI neg)    Full hx:  ESRD (end stage renal disease) on dialysis: since 7/2013 tue, thur, sat  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  Carpal tunnel syndrome  Brain cyst: had MRI recently- now gone  Anemia  Gastric ulcer: Long time ago  Rheumatoid arthritis  Breakdown (mechanical) of penile (implanted) prosthesis, sequela  End-stage renal disease (ESRD): PERMACATH PLACEMENT  Abscess of left buttock: s/p I &amp; D  AV fistula: placement left lower arm  S/P cystoscopy: stent placement &amp; removal for kidney stones, lithiotripsy  s/p Lt Carpal tunnel: 2011

## 2019-12-08 NOTE — BRIEF OPERATIVE NOTE - OPERATION/FINDINGS
DDRT to right iliac fossa. Simulect induction. Two arteries anastomosed to a single cuff on the back table. One vein, one ureter. Ureteral anastomosis performed over a double J stent, which was sutured to the martinez. Cold ischemia time 25.5 hours.    Donor Info: Age 49, ABO A, KDPI 81%, X-Clamp time 12:38 12/7, Terminal Cr 1, CMV(-), EBV(-).    Recipient Info: Age 49, CPRA 0%, ABO A, CMV(+), EBV(+).

## 2019-12-08 NOTE — PROVIDER CONTACT NOTE (CRITICAL VALUE NOTIFICATION) - ASSESSMENT
vital signs stable on room air, see flowsheet for vital sign details. patient resting comfortably in bed

## 2019-12-08 NOTE — CONSULT NOTE ADULT - SUBJECTIVE AND OBJECTIVE BOX
[facility] DIVISION OF KIDNEY DISEASES AND HYPERTENSION -- INITIAL CONSULT NOTE  --------------------------------------------------------------------------------  HPI:    48 year old male with PMH ESRD on HD T/Th/Sat via L arm fistula since Aug 2013 (at [facility], followed by Nephrologist - Dr. 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 Thiago Young on 2/9/2018 at [place]. Presented today for DDRT, as per patient cause of kidney disease was due to uncontrolled HTN, last dialysis 12/07/19 w/o complication 2 lit of UF. Denies any recent infecitions, hospitalizations compliant with medications.   During examination pt was awake NAD, feeling well.     Off note negative ischemic evaluation by nuclear stress test in July 2018, and evaluated by Oncology on 10/19 cleared him from any possible malignancy       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  Carpal tunnel syndrome  Brain cyst: had MRI recently- now gone  Anemia  Gastric ulcer: Long time ago  Rheumatoid arthritis  Breakdown (mechanical) of penile (implanted) prosthesis, sequela  End-stage renal disease (ESRD): PERMACATH PLACEMENT  Abscess of left buttock: s/p I &amp; D  AV fistula: placement left lower arm  S/P cystoscopy: stent placement &amp; removal for kidney stones, lithiotripsy  s/p Lt Carpal tunnel: 2011    FAMILY HISTORY:  Family history of myocardial infarction in first degree male relative  Family history of rheumatoid arthritis  Family history of hypertension in mother  Family history of cerebral aneurysm (Sibling)    PAST SOCIAL HISTORY:    ALLERGIES & MEDICATIONS  --------------------------------------------------------------------------------  Allergies    coconut (Anaphylaxis)  morphine (Pruritus; Rash)    Intolerances      Standing Inpatient Medications  basiliximab  IVPB 20 milliGRAM(s) IV Intermittent once  ceFAZolin   IVPB 2000 milliGRAM(s) IV Intermittent once  methylPREDNISolone sodium succinate IVPB 500 milliGRAM(s) IV Intermittent once  sodium chloride 0.9% lock flush 3 milliLiter(s) IV Push every 8 hours    PRN Inpatient Medications      REVIEW OF SYSTEMS  --------------------------------------------------------------------------------  Gen: no fever, chills, fatigue.   Respiratory: No dyspnea  CV: No chest pain  GI: No abdominal pain  MSK: + LE edema  Neuro: No dizziness  Heme: No bleeding    All other systems were reviewed and are negative, except as noted.    VITALS/PHYSICAL EXAM  --------------------------------------------------------------------------------  T(C): --  HR: --  BP: --  RR: --PENDING   SpO2: --  Wt(kg): --  Height (cm): 180.3 (12-08-19 @ 11:11)  Weight (kg): 64.1 (12-08-19 @ 11:11)  BMI (kg/m2): 19.7 (12-08-19 @ 11:11)  BSA (m2): 1.82 (12-08-19 @ 11:11)      Physical Exam:  	Gen: NAD,   	HEENT: , supple neck, clear oropharynx  	Pulm: CTA B/L  	CV: RRR, S1S2; no rub  	Abd: +BS, soft, nontender/nondistended  	: No suprapubic tenderness  	UE:; no edema; no asterixis  	LE: +edema  	Neuro: No focal deficits,   	Vascular access: AVF good bruit or thrill    LABS/STUDIES  --------------------------------------------------------------------------------    PENDING            Creatinine Trend:    Urinalysis - [07-07-15 @ 12:22]      Color Yellow / Appearance Clear / SG 1.012 / pH 7.0      Gluc Negative / Ketone Negative  / Bili Negative / Urobili Normal       Blood Trace / Protein 500 / Leuk Est Small / Nitrite Negative      RBC 2-5 / WBC 10-25 / Hyaline  / Gran  / Sq Epi  / Non Sq Epi  / Bacteria

## 2019-12-08 NOTE — CONSULT NOTE ADULT - ASSESSMENT
48 year old male with PMH ESRD on HD T/Th/Sat via L arm fistula since Aug 2013 (at [facility],) presented for DDRT

## 2019-12-09 DIAGNOSIS — Z94.0 KIDNEY TRANSPLANT STATUS: ICD-10-CM

## 2019-12-09 DIAGNOSIS — D89.9 DISORDER INVOLVING THE IMMUNE MECHANISM, UNSPECIFIED: ICD-10-CM

## 2019-12-09 LAB
ANION GAP SERPL CALC-SCNC: 21 MMOL/L — HIGH (ref 5–17)
APTT BLD: 26.2 SEC — LOW (ref 27.5–36.3)
BUN SERPL-MCNC: 18 MG/DL — SIGNIFICANT CHANGE UP (ref 7–23)
CALCIUM SERPL-MCNC: 7.7 MG/DL — LOW (ref 8.4–10.5)
CHLORIDE SERPL-SCNC: 93 MMOL/L — LOW (ref 96–108)
CO2 SERPL-SCNC: 22 MMOL/L — SIGNIFICANT CHANGE UP (ref 22–31)
CREAT SERPL-MCNC: 5.79 MG/DL — HIGH (ref 0.5–1.3)
GLUCOSE BLDC GLUCOMTR-MCNC: 184 MG/DL — HIGH (ref 70–99)
GLUCOSE BLDC GLUCOMTR-MCNC: 212 MG/DL — HIGH (ref 70–99)
GLUCOSE BLDC GLUCOMTR-MCNC: 267 MG/DL — HIGH (ref 70–99)
GLUCOSE SERPL-MCNC: 258 MG/DL — HIGH (ref 70–99)
HCT VFR BLD CALC: 21.5 % — LOW (ref 39–50)
HCT VFR BLD CALC: 23.6 % — LOW (ref 39–50)
HCT VFR BLD CALC: 24.3 % — LOW (ref 39–50)
HGB BLD-MCNC: 7.3 G/DL — LOW (ref 13–17)
HGB BLD-MCNC: 7.7 G/DL — LOW (ref 13–17)
HGB BLD-MCNC: 8.2 G/DL — LOW (ref 13–17)
INR BLD: 1.02 RATIO — SIGNIFICANT CHANGE UP (ref 0.88–1.16)
LDH SERPL L TO P-CCNC: 271 U/L — HIGH (ref 50–242)
LDH SERPL L TO P-CCNC: 281 U/L — HIGH (ref 50–242)
MAGNESIUM SERPL-MCNC: 1.7 MG/DL — SIGNIFICANT CHANGE UP (ref 1.6–2.6)
MAGNESIUM SERPL-MCNC: 1.8 MG/DL — SIGNIFICANT CHANGE UP (ref 1.6–2.6)
MCHC RBC-ENTMCNC: 32.6 GM/DL — SIGNIFICANT CHANGE UP (ref 32–36)
MCHC RBC-ENTMCNC: 33.3 PG — SIGNIFICANT CHANGE UP (ref 27–34)
MCHC RBC-ENTMCNC: 33.7 GM/DL — SIGNIFICANT CHANGE UP (ref 32–36)
MCHC RBC-ENTMCNC: 34 GM/DL — SIGNIFICANT CHANGE UP (ref 32–36)
MCHC RBC-ENTMCNC: 34 PG — SIGNIFICANT CHANGE UP (ref 27–34)
MCHC RBC-ENTMCNC: 34.2 PG — HIGH (ref 27–34)
MCV RBC AUTO: 100 FL — SIGNIFICANT CHANGE UP (ref 80–100)
MCV RBC AUTO: 101.3 FL — HIGH (ref 80–100)
MCV RBC AUTO: 102.2 FL — HIGH (ref 80–100)
NRBC # BLD: 0 /100 WBCS — SIGNIFICANT CHANGE UP (ref 0–0)
PHOSPHATE SERPL-MCNC: 6.6 MG/DL — HIGH (ref 2.5–4.5)
PHOSPHATE SERPL-MCNC: 6.7 MG/DL — HIGH (ref 2.5–4.5)
PLATELET # BLD AUTO: 124 K/UL — LOW (ref 150–400)
PLATELET # BLD AUTO: 131 K/UL — LOW (ref 150–400)
PLATELET # BLD AUTO: 148 K/UL — LOW (ref 150–400)
POTASSIUM SERPL-MCNC: 4.2 MMOL/L — SIGNIFICANT CHANGE UP (ref 3.5–5.3)
POTASSIUM SERPL-SCNC: 4.2 MMOL/L — SIGNIFICANT CHANGE UP (ref 3.5–5.3)
PROTHROM AB SERPL-ACNC: 11.7 SEC — SIGNIFICANT CHANGE UP (ref 10–12.9)
RBC # BLD: 2.15 M/UL — LOW (ref 4.2–5.8)
RBC # BLD: 2.31 M/UL — LOW (ref 4.2–5.8)
RBC # BLD: 2.4 M/UL — LOW (ref 4.2–5.8)
RBC # FLD: 17 % — HIGH (ref 10.3–14.5)
RBC # FLD: 18.6 % — HIGH (ref 10.3–14.5)
RBC # FLD: 18.6 % — HIGH (ref 10.3–14.5)
SODIUM SERPL-SCNC: 136 MMOL/L — SIGNIFICANT CHANGE UP (ref 135–145)
TACROLIMUS SERPL-MCNC: <2 NG/ML — SIGNIFICANT CHANGE UP
WBC # BLD: 11.12 K/UL — HIGH (ref 3.8–10.5)
WBC # BLD: 11.38 K/UL — HIGH (ref 3.8–10.5)
WBC # BLD: 12.77 K/UL — HIGH (ref 3.8–10.5)
WBC # FLD AUTO: 11.12 K/UL — HIGH (ref 3.8–10.5)
WBC # FLD AUTO: 11.38 K/UL — HIGH (ref 3.8–10.5)
WBC # FLD AUTO: 12.77 K/UL — HIGH (ref 3.8–10.5)

## 2019-12-09 PROCEDURE — 76776 US EXAM K TRANSPL W/DOPPLER: CPT | Mod: 26,RT

## 2019-12-09 PROCEDURE — 99232 SBSQ HOSP IP/OBS MODERATE 35: CPT | Mod: GC

## 2019-12-09 RX ORDER — SODIUM CHLORIDE 9 MG/ML
1000 INJECTION INTRAMUSCULAR; INTRAVENOUS; SUBCUTANEOUS
Refills: 0 | Status: DISCONTINUED | OUTPATIENT
Start: 2019-12-09 | End: 2019-12-10

## 2019-12-09 RX ORDER — ACETAMINOPHEN 500 MG
650 TABLET ORAL EVERY 6 HOURS
Refills: 0 | Status: DISCONTINUED | OUTPATIENT
Start: 2019-12-09 | End: 2019-12-16

## 2019-12-09 RX ORDER — TRAMADOL HYDROCHLORIDE 50 MG/1
50 TABLET ORAL EVERY 6 HOURS
Refills: 0 | Status: DISCONTINUED | OUTPATIENT
Start: 2019-12-09 | End: 2019-12-16

## 2019-12-09 RX ORDER — TRAMADOL HYDROCHLORIDE 50 MG/1
25 TABLET ORAL EVERY 4 HOURS
Refills: 0 | Status: DISCONTINUED | OUTPATIENT
Start: 2019-12-09 | End: 2019-12-16

## 2019-12-09 RX ORDER — FUROSEMIDE 40 MG
80 TABLET ORAL ONCE
Refills: 0 | Status: COMPLETED | OUTPATIENT
Start: 2019-12-09 | End: 2019-12-09

## 2019-12-09 RX ORDER — LATANOPROST 0.05 MG/ML
1 SOLUTION/ DROPS OPHTHALMIC; TOPICAL AT BEDTIME
Refills: 0 | Status: DISCONTINUED | OUTPATIENT
Start: 2019-12-09 | End: 2019-12-20

## 2019-12-09 RX ORDER — ACETAMINOPHEN 500 MG
1000 TABLET ORAL ONCE
Refills: 0 | Status: COMPLETED | OUTPATIENT
Start: 2019-12-09 | End: 2019-12-09

## 2019-12-09 RX ORDER — METOPROLOL TARTRATE 50 MG
25 TABLET ORAL
Refills: 0 | Status: DISCONTINUED | OUTPATIENT
Start: 2019-12-09 | End: 2019-12-20

## 2019-12-09 RX ORDER — DEXAMETHASONE 0.5 MG/5ML
6 ELIXIR ORAL ONCE
Refills: 0 | Status: COMPLETED | OUTPATIENT
Start: 2019-12-09 | End: 2019-12-09

## 2019-12-09 RX ORDER — METOCLOPRAMIDE HCL 10 MG
10 TABLET ORAL ONCE
Refills: 0 | Status: COMPLETED | OUTPATIENT
Start: 2019-12-09 | End: 2019-12-09

## 2019-12-09 RX ORDER — DORZOLAMIDE HYDROCHLORIDE 20 MG/ML
1 SOLUTION/ DROPS OPHTHALMIC THREE TIMES A DAY
Refills: 0 | Status: DISCONTINUED | OUTPATIENT
Start: 2019-12-09 | End: 2019-12-20

## 2019-12-09 RX ORDER — BRIMONIDINE TARTRATE 2 MG/MG
1 SOLUTION/ DROPS OPHTHALMIC THREE TIMES A DAY
Refills: 0 | Status: DISCONTINUED | OUTPATIENT
Start: 2019-12-09 | End: 2019-12-20

## 2019-12-09 RX ORDER — METOPROLOL TARTRATE 50 MG
25 TABLET ORAL ONCE
Refills: 0 | Status: COMPLETED | OUTPATIENT
Start: 2019-12-09 | End: 2019-12-09

## 2019-12-09 RX ADMIN — SODIUM CHLORIDE 3 MILLILITER(S): 9 INJECTION INTRAMUSCULAR; INTRAVENOUS; SUBCUTANEOUS at 11:31

## 2019-12-09 RX ADMIN — TRAMADOL HYDROCHLORIDE 50 MILLIGRAM(S): 50 TABLET ORAL at 17:58

## 2019-12-09 RX ADMIN — Medication 400 MILLIGRAM(S): at 20:25

## 2019-12-09 RX ADMIN — Medication 500000 UNIT(S): at 23:17

## 2019-12-09 RX ADMIN — DORZOLAMIDE HYDROCHLORIDE 1 DROP(S): 20 SOLUTION/ DROPS OPHTHALMIC at 23:17

## 2019-12-09 RX ADMIN — SODIUM CHLORIDE 100 MILLILITER(S): 9 INJECTION INTRAMUSCULAR; INTRAVENOUS; SUBCUTANEOUS at 13:39

## 2019-12-09 RX ADMIN — MYCOPHENOLATE MOFETIL 1 GRAM(S): 250 CAPSULE ORAL at 18:00

## 2019-12-09 RX ADMIN — HYDROMORPHONE HYDROCHLORIDE 0.5 MILLIGRAM(S): 2 INJECTION INTRAMUSCULAR; INTRAVENOUS; SUBCUTANEOUS at 00:00

## 2019-12-09 RX ADMIN — MYCOPHENOLATE MOFETIL 1 GRAM(S): 250 CAPSULE ORAL at 06:39

## 2019-12-09 RX ADMIN — Medication 500000 UNIT(S): at 02:03

## 2019-12-09 RX ADMIN — Medication 500000 UNIT(S): at 18:00

## 2019-12-09 RX ADMIN — Medication 1 TABLET(S): at 12:01

## 2019-12-09 RX ADMIN — TRAMADOL HYDROCHLORIDE 50 MILLIGRAM(S): 50 TABLET ORAL at 12:15

## 2019-12-09 RX ADMIN — Medication 125 MILLIGRAM(S): at 18:00

## 2019-12-09 RX ADMIN — SODIUM CHLORIDE 3 MILLILITER(S): 9 INJECTION INTRAMUSCULAR; INTRAVENOUS; SUBCUTANEOUS at 05:21

## 2019-12-09 RX ADMIN — SODIUM CHLORIDE 3 MILLILITER(S): 9 INJECTION INTRAMUSCULAR; INTRAVENOUS; SUBCUTANEOUS at 23:18

## 2019-12-09 RX ADMIN — VALGANCICLOVIR 450 MILLIGRAM(S): 450 TABLET, FILM COATED ORAL at 12:00

## 2019-12-09 RX ADMIN — BRIMONIDINE TARTRATE 1 DROP(S): 2 SOLUTION/ DROPS OPHTHALMIC at 13:39

## 2019-12-09 RX ADMIN — Medication 80 MILLIGRAM(S): at 18:34

## 2019-12-09 RX ADMIN — TRAMADOL HYDROCHLORIDE 50 MILLIGRAM(S): 50 TABLET ORAL at 18:50

## 2019-12-09 RX ADMIN — TACROLIMUS 5 MILLIGRAM(S): 5 CAPSULE ORAL at 08:24

## 2019-12-09 RX ADMIN — LATANOPROST 1 DROP(S): 0.05 SOLUTION/ DROPS OPHTHALMIC; TOPICAL at 23:17

## 2019-12-09 RX ADMIN — BRIMONIDINE TARTRATE 1 DROP(S): 2 SOLUTION/ DROPS OPHTHALMIC at 23:17

## 2019-12-09 RX ADMIN — Medication 25 MILLIGRAM(S): at 23:17

## 2019-12-09 RX ADMIN — Medication 10 MILLIGRAM(S): at 00:15

## 2019-12-09 RX ADMIN — FAMOTIDINE 20 MILLIGRAM(S): 10 INJECTION INTRAVENOUS at 12:01

## 2019-12-09 RX ADMIN — Medication 500000 UNIT(S): at 12:00

## 2019-12-09 RX ADMIN — Medication 500000 UNIT(S): at 06:38

## 2019-12-09 RX ADMIN — Medication 125 MILLIGRAM(S): at 06:39

## 2019-12-09 RX ADMIN — DORZOLAMIDE HYDROCHLORIDE 1 DROP(S): 20 SOLUTION/ DROPS OPHTHALMIC at 13:39

## 2019-12-09 RX ADMIN — Medication 6 MILLIGRAM(S): at 00:15

## 2019-12-09 RX ADMIN — TRAMADOL HYDROCHLORIDE 50 MILLIGRAM(S): 50 TABLET ORAL at 11:15

## 2019-12-09 NOTE — PROGRESS NOTE ADULT - ASSESSMENT
49M with significant PMHx of ESRD on HD  (no bx, since 2013 with  TTS via LUE AVF, anuric), HTN, Gout, Glaucoma, NET of pancreas (s/p robotic distal panc/splenectomy at [place] 2015 by Dr. Young, followed by Dr. Raphael, [facility] Oncologist), RA with hand contractures, now s/p DDRT on 12/8/19 POD#1.    Kidney replaced by transplant:   - POD#1  doing well.    - Sstart PRN Tylenol and tramadol for pain, Monitor and manage pain  - Oliguric made 75ml overnight, keep Kennedy in place  - D/C replacement fluids and increase IVF to 100ml/hr  - +multiple BM pt with h/o daily multiple BM, will monitor, hold BM regimen   - Robles clears advance Po diet  - Pt encouraged to ambulate  - Dressing changed  - S/P 2 u PRBC post op, repeat CBC @12pm.  - Plan to d/c TLC once have PIV placed    Immunosuppressive management encounter following kidney transplant:    Continue Tacrolimus, cellcept, steroid taper, Simulect induction next dose on day 4  PPX: Nystatin S&S, bactrim, and Valcyte   F/U Tacrolimus level daily, tacro goal 8-10  Monitor closely.     HTN (Hypertension).  Plan:   Monitor bP.

## 2019-12-09 NOTE — PROGRESS NOTE ADULT - SUBJECTIVE AND OBJECTIVE BOX
[facility] DIVISION OF KIDNEY DISEASES AND HYPERTENSION -- FOLLOW UP NOTE  --------------------------------------------------------------------------------  Chief Complaint: s/p DDRT    24 hour events/subjective: Patient evaluated at bedside, in no acute distress. Denies any new complaints. Patient remains oliguric but electrolytes remain WNL.    PAST HISTORY  --------------------------------------------------------------------------------  No significant changes to PMH, PSH, FHx, SHx, unless otherwise noted    ALLERGIES & MEDICATIONS  --------------------------------------------------------------------------------  Allergies    coconut (Anaphylaxis)  morphine (Pruritus; Rash)    Intolerances      Standing Inpatient Medications  brimonidine 0.2% Ophthalmic Solution 1 Drop(s) Left EYE three times a day  chlorhexidine 4% Liquid 1 Application(s) Topical <User Schedule>  dorzolamide 2% Ophthalmic Solution 1 Drop(s) Left EYE three times a day  famotidine    Tablet 20 milliGRAM(s) Oral daily  latanoprost 0.005% Ophthalmic Solution 1 Drop(s) Left EYE at bedtime  methylPREDNISolone sodium succinate Injectable 125 milliGRAM(s) IV Push two times a day  mycophenolate mofetil 1 Gram(s) Oral every 12 hours  nystatin    Suspension 534085 Unit(s) Swish and Swallow four times a day  sodium chloride 0.9% lock flush 3 milliLiter(s) IV Push every 8 hours  sodium chloride 0.9%. 1000 milliLiter(s) IV Continuous <Continuous>  tacrolimus ER Tablet (ENVARSUS XR) 5 milliGRAM(s) Oral daily  trimethoprim   80 mG/sulfamethoxazole 400 mG 1 Tablet(s) Oral daily  valGANciclovir 450 milliGRAM(s) Oral daily    REVIEW OF SYSTEMS  --------------------------------------------------------------------------------  Gen: No fatigue, fevers/chills, weakness  Skin: No rashes  Head/Eyes/Ears/Mouth: No headache;No sore throat  Respiratory: No dyspnea, cough,   CV: No chest pain, PND, orthopnea  GI: (+) abdominal pain  Transplant: No pain  : No increased frequency, dysuria, hematuria, nocturia  MSK: No joint pain/swelling; no back pain; no edema  Neuro: No dizziness/lightheadedness, weakness, seizures, numbness, tingling  Psych: No significant nervousness, anxiety, stress, depression    All other systems were reviewed and are negative, except as noted.    VITALS/PHYSICAL EXAM  --------------------------------------------------------------------------------  T(C): 36.7 (12-09-19 @ 12:00), Max: 36.9 (12-09-19 @ 06:00)  HR: 69 (12-09-19 @ 12:00) (65 - 81)  BP: 154/80 (12-09-19 @ 12:00) (115/64 - 188/91)  RR: 18 (12-09-19 @ 12:00) (15 - 19)  SpO2: 99% (12-09-19 @ 12:00) (96% - 100%)  Wt(kg): --  Height (cm): 180.34 (12-08-19 @ 12:31)  Weight (kg): 64.1 (12-08-19 @ 12:31)  BMI (kg/m2): 19.7 (12-08-19 @ 12:31)  BSA (m2): 1.82 (12-08-19 @ 12:31)    12-08-19 @ 07:01  -  12-09-19 @ 07:00  --------------------------------------------------------  IN: 2100 mL / OUT: 174 mL / NET: 1926 mL    12-09-19 @ 07:01  -  12-09-19 @ 13:28  --------------------------------------------------------  IN: 830 mL / OUT: 29 mL / NET: 801 mL    Physical Exam:  	Gen: NAD, well-appearing  	HEENT: PERRL, supple neck, clear oropharynx  	Pulm: CTA B/L  	CV: RRR, S1S2; no rub  	Back: No spinal or CVA tenderness; no sacral edema  	Abd: +BS, soft, nontender/nondistended              Transplant: No tenderness, swelling  	: No suprapubic tenderness  	UE: Warm, FROM, intact strength; no edema; no asterixis  	LE: Warm, FROM, intact strength; no edema  	Neuro: No focal deficits, intact gait  	Psych: Normal affect and mood  	Skin: Warm, without rashes    LABS/STUDIES  --------------------------------------------------------------------------------              7.7    11.38 >-----------<  131      [12-09-19 @ 07:19]              23.6     136  |  93  |  18  ----------------------------<  258      [12-09-19 @ 00:45]  4.2   |  22  |  5.79        Ca     7.7     [12-09-19 @ 00:45]      Mg     1.8     [12-09-19 @ 07:18]      Phos  6.6     [12-09-19 @ 07:18]    TPro  6.1  /  Alb  3.1  /  TBili  1.0  /  DBili  x   /  AST  31  /  ALT  18  /  AlkPhos  101  [12-08-19 @ 19:58]    PT/INR: PT 11.7 , INR 1.02       [12-09-19 @ 07:19]  PTT: 26.2       [12-09-19 @ 07:19]          [12-09-19 @ 07:18]    Creatinine Trend:  SCr 5.79 [12-09 @ 00:45]  SCr 5.73 [12-08 @ 19:58]  SCr 5.53 [12-08 @ 11:49]    Tacrolimus (), Serum: <2.0 ng/mL (12-09 @ 07:28)

## 2019-12-09 NOTE — PROGRESS NOTE ADULT - PROBLEM SELECTOR PLAN 1
Patient s/p DDRT on 12/8/2019. Remains oliguric but electrolytes stable. Patient on Envarsus 5 mg PO, Cellcept 1 gm BID, and steroid taper. Patient on antibiotic prophylaxis with Valcyte, Bactrim, and Nystatin. Check Prograf level prior to next AM dose.

## 2019-12-09 NOTE — PROGRESS NOTE ADULT - ASSESSMENT
48 year old male with PMH ESRD on HD T/Th/Sat via L arm fistula since Aug 2013 (at [facility],) s/p DDRT on 12/8/2019.    Recipient info:   CPRA:    o%  ABO:      A+  CMVAb:  Positive  EBVIgG Status:  Positive  Last HD:  12/7/2019    Donor ID:  [id number]  Match: [id number]  OPO: NYRT  Age:  49  ABO:  A  KDPI 81%  COD:  Anoxia  X Clamp Time:  12/7/2019 1538.  Medical Hx: DM, HTN, HLD, obesity BMI 53, CAD, CABAGX3  Terminal Cr:  1/1.8/1.7  CMV- Neg EBV- Neg

## 2019-12-09 NOTE — CHART NOTE - NSCHARTNOTEFT_GEN_A_CORE
S: Patient underwent DDRT to R Iliac Vessels, tolerated procedure without issue, and sent to PACU. Patient denies chest pain, shortness of breath, nausea, vomiting, lightheadedness, or dizziness.  Pain was controlled.                        8.2    11.12 )-----------( 148      ( 09 Dec 2019 00:42 )             24.3     136  |  93<L>  |  18  ----------------------------<  258<H>  4.2   |  22  |  5.79<H>    Ca    7.7<L>      09 Dec 2019 00:45  Phos  6.7     12-09  Mg     1.7     12-09      Gen: NAD  Abd: Soft, appropriate incisional tenderness, nondistended.  Ext: Palp DP b/l        Assessment/Plan:  49y Male s/p Transplant, kidney, cadaveric    - Immunosuppression w/ Envarsus, Cellcept, Steroid taper, Simulect on PoD 4  - Monitor UoP and replete per protocol  - Pain control  - OOB, Ambulate  - TONI Mac S: Patient underwent DDRT to R Iliac Vessels, tolerated procedure without issue, and sent to PACU. Patient denies chest pain, shortness of breath, nausea, vomiting, lightheadedness, or dizziness.  Pain was controlled.                        8.2    11.12 )-----------( 148      ( 09 Dec 2019 00:42 )             24.3     136  |  93<L>  |  18  ----------------------------<  258<H>  4.2   |  22  |  5.79<H>    Ca    7.7<L>      09 Dec 2019 00:45  Phos  6.7     12-09  Mg     1.7     12-09      Gen: NAD  Abd: Soft, appropriate incisional tenderness, nondistended.  Ext: Palp DP b/l        Assessment/Plan:  49y Male s/p Transplant, kidney, cadaveric    - Immunosuppression w/ Envarsus, Cellcept, Steroid taper, Simulect on PoD 4  - Monitor UoP and replete per protocol  - Pain control  - OOB, Ambulate, IS  - ADAT    GREGORY Mac

## 2019-12-09 NOTE — PROGRESS NOTE ADULT - ASSESSMENT
KAYLA PA is a 49y Male s/p DDRT on 12/8/2019. PMH is significant for HTN, gout, glaucoma.    Allergies: morphine- pruritus, rash  CMV-/+    Transplant Medications  Induction  -Basiliximab 20 mg POD 0 (given in OR) and POD 4  -Methyprednisolone taper (switch to PO prednisone on POD 4)            POD 0: 500 mg IV in OR            POD 1: 125 mg IV Q12H            POD 2: 60 mg IV Q12H            POD 3: 30 mg IV Q12H        Maintenance Immunosuppression  -Tacrolimus XR 0.14 mg/kg/dose Q24H at 6AM (Adjust for goal trough: 8-10)  -Mycophenolate 1,000 mg PO/IV Q12H  -Prednisone             POD 4: 20 mg PO Q12H            POD 5: 10 mg PO Q12H            POD 6: 5 mg PO Q12H            POD 7-: 5 mg daily     Anti-infection   -Bactrim SS tablet (frequency based on renal function)  -Valganciclovir (dose based on CMV serostatus and frequency based on renal function)  -Nystatin swish and swallow 5 mL four times daily    Surgical prophylaxis pre- and intra-operative dosing  -Cefazolin    Prophylaxis  -GI ppx: famotidine 20 mg daily  -Bowel ppx: senna/colace  -DVT: sequential compression device  -Pain:            Mild: Acetaminophen 650 mg every 6 hours PRN           Moderate: Tramadol 25 mg every 4 hours PRN (adjust for renal function)           Severe: Tramadol 50 mg every 4 hours PRN (adjust for renal function)    Home medications  -gabapentin 100 mg twice daily  -hydralazine 50 mg twice daily  -metoprolol 25 mg twice daily  -famotidine 40 mg daily  -alprazolam 0.25 mg PRN  -allopurinol 100 mg daily  -lantanoprost  -simbrinza    Outpatient medication reconciliation reviewed and will be re-started appropriately.  Plan discussed with multidisciplinary team.     Debbi Ortiz, JadaD

## 2019-12-09 NOTE — PROGRESS NOTE ADULT - SUBJECTIVE AND OBJECTIVE BOX
Transplant Surgery - Multidisciplinary Rounds  --------------------------------------------------------------  DDRT POD#1     Present:  Patient seen and examined with multidisciplinary team including Transplant Surgeon: Dr. Espinal, Dr. Cruz, Dr. Poe,  NP: Stefan,  Nephrologist Dr. Lowery  renal fellow Chin, Pharmacist CAROL Ortiz, Surgical resident Everton Walker, PGY3, and examined by Dr. Espinal. Disciplines not in attendance will be notified of the plan.    HPI: 49M with significant PMHx of ESRD on HD  (no bx, since 2013 with  TTS via LUE AVF, anuric), HTN, Gout, Glaucoma, NET of pancreas (s/p robotic distal panc/splenectomy at [place] 2015 by Dr. Young, followed by Dr. Raphael, [facility] Oncologist), RA with hand contractures, now s/p DDRT on 12/8/19 POD#1.    Last colonoscopy: clear 2017  Onc: cleared by [facility] 2019 (low grade NET, risk of HD greater than recurrence of low grade NET, recent PET/MRI neg)    Reccipient info:   CPRA:    o%  ABO:      A+  CMVAb:  Positive  EBVIgG Status:  Positive  Last HD:  12/7/2019    Donor ID:  [id number]  Match: [id number]  OPO: NYRT  Age:  49  ABO:  A  KDPI 81%  COD:  Anoxia  X Clamp Time:  12/7/2019 1538.  Medical Hx: DM, HTN, HLD, obesity BMI 53, CAD, CABAGX3  Terminal Cr:  1/1.8/1.7  CMV- Neg EBV- Neg    OR:    DDRT to right iliac fossa. Simulect induction. Two arteries anastomosed to a single cuff on the back table. One vein, one ureter. Ureteral anastomosis performed over a double J stent, which was sutured to the martinez. Cold ischemia time 25.5 hours.    Interval Events:  Post op H&H dropped to 6.3/19 received 2 u PRBC, repeat this am 7.7/23.6  Dressing changed with bloody drainage  BO output serosanguineous  Post op US with no stenosis     PE: See below  Potential Discharge date: Pending clinical evaluation  Education: Medication and post op care    Plan of care: See below    MEDICATIONS  (STANDING):  brimonidine 0.2% Ophthalmic Solution 1 Drop(s) Left EYE three times a day  chlorhexidine 4% Liquid 1 Application(s) Topical <User Schedule>  dorzolamide 2% Ophthalmic Solution 1 Drop(s) Left EYE three times a day  famotidine    Tablet 20 milliGRAM(s) Oral daily  latanoprost 0.005% Ophthalmic Solution 1 Drop(s) Left EYE at bedtime  methylPREDNISolone sodium succinate Injectable 125 milliGRAM(s) IV Push two times a day  mycophenolate mofetil 1 Gram(s) Oral every 12 hours  nystatin    Suspension [id number] Unit(s) Swish and Swallow four times a day  sodium chloride 0.9% lock flush 3 milliLiter(s) IV Push every 8 hours  sodium chloride 0.9%. 1000 milliLiter(s) (100 mL/Hr) IV Continuous <Continuous>  tacrolimus ER Tablet (ENVARSUS XR) 5 milliGRAM(s) Oral daily  trimethoprim   80 mG/sulfamethoxazole 400 mG 1 Tablet(s) Oral daily  valGANciclovir 450 milliGRAM(s) Oral daily    MEDICATIONS  (PRN):  acetaminophen   Tablet .. 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  Carpal tunnel syndrome  Brain cyst: had MRI recently- now gone  Anemia  Gastric ulcer: Long time ago  Rheumatoid arthritis  Breakdown (mechanical) of penile (implanted) prosthesis, sequela  End-stage renal disease (ESRD): PERMACATH PLACEMENT  Abscess of left buttock: s/p I &amp; D  AV fistula: placement left lower arm  S/P cystoscopy: stent placement &amp; removal for kidney stones, lithiotripsy  s/p Lt Carpal tunnel: 2011      Vital Signs Last 24 Hrs  T(C): 36.7 (09 Dec 2019 12:00), Max: 36.9 (09 Dec 2019 06:00)  T(F): 98 (09 Dec 2019 12:00), Max: 98.4 (09 Dec 2019 06:00)  HR: 69 (09 Dec 2019 12:00) (65 - 81)  BP: 154/80 (09 Dec 2019 12:00) (115/64 - 188/91)  BP(mean): 110 (09 Dec 2019 12:00) (83 - 132)  RR: 18 (09 Dec 2019 12:00) (15 - 19)  SpO2: 99% (09 Dec 2019 12:00) (96% - 100%)    I&O's Summary    08 Dec 2019 07:01  -  09 Dec 2019 07:00  --------------------------------------------------------  IN: 2100 mL / OUT: 174 mL / NET: 1926 mL    09 Dec 2019 07:01  -  09 Dec 2019 12:51  --------------------------------------------------------  IN: 830 mL / OUT: 29 mL / NET: 801 mL                              7.7    11.38 )-----------( 131      ( 09 Dec 2019 07:19 )             23.6     12-09    136  |  93<L>  |  18  ----------------------------<  258<H>  4.2   |  22  |  5.79<H>    Ca    7.7<L>      09 Dec 2019 00:45  Phos  6.6     12-09  Mg     1.8     12-09    TPro  6.1  /  Alb  3.1<L>  /  TBili  1.0  /  DBili  x   /  AST  31  /  ALT  18  /  AlkPhos  101  12-08    Tacrolimus (), Serum: <2.0 ng/mL (12-09 @ 07:28)      REVIEW OF SYSTEMS  --------------------------------------------------------------------------------  Gen: No weight changes, fatigue, fevers/chills, weakness  Skin: No rashes  Head/Eyes/Ears/Mouth: No headache; Normal hearing; Normal vision w/o blurriness; No sinus pain/discomfort, sore throat  Respiratory: No dyspnea, cough, wheezing, hemoptysis  CV: No chest pain, PND, orthopnea  GI: No abdominal pain, diarrhea, constipation, nausea, vomiting, melena, hematochezia  : No increased frequency, dysuria, hematuria, nocturia  MSK: No joint pain/swelling; no back pain; no edema  Neuro: No dizziness/lightheadedness, weakness, seizures, numbness, tingling  Heme: No easy bruising or bleeding  Endo: No heat/cold intolerance  Psych: No significant nervousness, anxiety, stress, depression  All other systems were reviewed and are negative, except as noted.      PHYSICAL EXAM:  Constitutional: Well developed / well nourished  Eyes: Anicteric, PERRLA  ENMT: nc/at  Neck: Supple, RT IJ TLC C/D/I no sign of erythema or infection noted  Respiratory: CTA B/L  Cardiovascular: RRR  Gastrointestinal: Soft abdomen, NT, ND  Genitourinary:  Martinez in place  Extremities: SCD's in place and working bilaterally  Vascular: Palpable dp pulses bilaterally  Neurological: A&O x3  Skin: Wound dressing changed with dry ASIF jackson  Musculoskeletal: Moving all extremities  Psychiatric: Responsive

## 2019-12-09 NOTE — PROGRESS NOTE ADULT - PROBLEM SELECTOR PLAN 3
BP above target range. Would start Nifedipine 30 mg PO as patient was requesting a bowel slowing agent and will control BP as well. Monitor BP on current BP medication. Low salt diet.

## 2019-12-10 LAB
ABO + RH PNL BLD: NORMAL
ALBUMIN SERPL ELPH-MCNC: 4.5 G/DL
ALP BLD-CCNC: 143 U/L
ALT SERPL-CCNC: 12 U/L
ANION GAP SERPL CALC-SCNC: 18 MMOL/L
ANION GAP SERPL CALC-SCNC: 21 MMOL/L — HIGH (ref 5–17)
ANION GAP SERPL CALC-SCNC: 23 MMOL/L — HIGH (ref 5–17)
AST SERPL-CCNC: 30 U/L
BASOPHILS # BLD AUTO: 0 K/UL — SIGNIFICANT CHANGE UP (ref 0–0.2)
BASOPHILS # BLD AUTO: 0.14 K/UL
BASOPHILS NFR BLD AUTO: 0 % — SIGNIFICANT CHANGE UP (ref 0–2)
BASOPHILS NFR BLD AUTO: 1.7 %
BILIRUB SERPL-MCNC: 0.6 MG/DL
BUN SERPL-MCNC: 18 MG/DL
BUN SERPL-MCNC: 34 MG/DL — HIGH (ref 7–23)
BUN SERPL-MCNC: 39 MG/DL — HIGH (ref 7–23)
C PEPTIDE SERPL-MCNC: 2.3 NG/ML
CALCIUM SERPL-MCNC: 8 MG/DL — LOW (ref 8.4–10.5)
CALCIUM SERPL-MCNC: 8.1 MG/DL — LOW (ref 8.4–10.5)
CALCIUM SERPL-MCNC: 9.6 MG/DL
CHLORIDE SERPL-SCNC: 87 MMOL/L — LOW (ref 96–108)
CHLORIDE SERPL-SCNC: 89 MMOL/L — LOW (ref 96–108)
CHLORIDE SERPL-SCNC: 92 MMOL/L
CHOLEST SERPL-MCNC: 129 MG/DL
CHOLEST/HDLC SERPL: 1.6 RATIO
CK SERPL-CCNC: 155 U/L
CMV DNA SPEC QL NAA+PROBE: NOT DETECTED
CMV IGG SERPL QL: >10 U/ML
CMV IGG SERPL-IMP: POSITIVE
CMV IGM SERPL QL: <8 AU/ML
CMV IGM SERPL QL: NEGATIVE
CO2 SERPL-SCNC: 20 MMOL/L — LOW (ref 22–31)
CO2 SERPL-SCNC: 21 MMOL/L — LOW (ref 22–31)
CO2 SERPL-SCNC: 30 MMOL/L
CREAT SERPL-MCNC: 5.81 MG/DL
CREAT SERPL-MCNC: 7.21 MG/DL — HIGH (ref 0.5–1.3)
CREAT SERPL-MCNC: 7.29 MG/DL — HIGH (ref 0.5–1.3)
CRP SERPL-MCNC: 0.71 MG/DL
EBV EA AB SER IA-ACNC: 34 U/ML
EBV EA AB TITR SER IF: POSITIVE
EBV EA IGG SER QL IA: 442 U/ML
EBV EA IGG SER-ACNC: POSITIVE
EBV EA IGM SER IA-ACNC: NEGATIVE
EBV PATRN SPEC IB-IMP: NORMAL
EBV VCA IGG SER IA-ACNC: 167 U/ML
EBV VCA IGM SER QL IA: <10 U/ML
EOSINOPHIL # BLD AUTO: 0 K/UL — SIGNIFICANT CHANGE UP (ref 0–0.5)
EOSINOPHIL # BLD AUTO: 0.43 K/UL
EOSINOPHIL NFR BLD AUTO: 0 % — SIGNIFICANT CHANGE UP (ref 0–6)
EOSINOPHIL NFR BLD AUTO: 5.1 %
EPSTEIN-BARR VIRUS CAPSID ANTIGEN IGG: POSITIVE
ERYTHROCYTE [SEDIMENTATION RATE] IN BLOOD BY WESTERGREN METHOD: 44 MM/HR
ESTIMATED AVERAGE GLUCOSE: 82 MG/DL
GLUCOSE BLDC GLUCOMTR-MCNC: 141 MG/DL — HIGH (ref 70–99)
GLUCOSE SERPL-MCNC: 151 MG/DL — HIGH (ref 70–99)
GLUCOSE SERPL-MCNC: 165 MG/DL — HIGH (ref 70–99)
GLUCOSE SERPL-MCNC: 85 MG/DL
HAV IGM SER QL: NONREACTIVE
HBA1C MFR BLD HPLC: 4.5 %
HBV CORE IGG+IGM SER QL: NONREACTIVE
HBV SURFACE AB SER QL: REACTIVE
HBV SURFACE AB SERPL IA-ACNC: 733.6 MIU/ML
HBV SURFACE AG SER QL: NONREACTIVE
HCT VFR BLD CALC: 18.2 % — CRITICAL LOW (ref 39–50)
HCT VFR BLD CALC: 22 % — LOW (ref 39–50)
HCT VFR BLD CALC: 24.4 % — LOW (ref 39–50)
HCT VFR BLD CALC: 28.6 %
HCV AB SER QL: NONREACTIVE
HCV S/CO RATIO: 0.11 S/CO
HDLC SERPL-MCNC: 80 MG/DL
HEPATITIS A IGG ANTIBODY: NONREACTIVE
HGB BLD-MCNC: 6.2 G/DL — CRITICAL LOW (ref 13–17)
HGB BLD-MCNC: 7.8 G/DL — LOW (ref 13–17)
HGB BLD-MCNC: 8.6 G/DL — LOW (ref 13–17)
HGB BLD-MCNC: 9.6 G/DL
HIV1+2 AB SPEC QL IA.RAPID: NONREACTIVE
HSV 1+2 IGG SER IA-IMP: POSITIVE
HSV1 IGG SER QL: 24.6 INDEX
HSV1 IGG SER QL: 24.6 INDEX
HSV1 IGM SER QL: NORMAL TITER
HSV2 AB FLD-ACNC: NORMAL TITER
HSV2 IGG SER QL: 4.77 INDEX
IMM GRANULOCYTES NFR BLD AUTO: 0.4 %
IMM GRANULOCYTES NFR BLD AUTO: 0.4 % — SIGNIFICANT CHANGE UP (ref 0–1.5)
LDH SERPL L TO P-CCNC: 457 U/L — HIGH (ref 50–242)
LDLC SERPL CALC-MCNC: 22 MG/DL
LYMPHOCYTES # BLD AUTO: 0.55 K/UL — LOW (ref 1–3.3)
LYMPHOCYTES # BLD AUTO: 2.8 K/UL
LYMPHOCYTES # BLD AUTO: 4.3 % — LOW (ref 13–44)
LYMPHOCYTES NFR BLD AUTO: 33.1 %
M TB IFN-G BLD-IMP: NEGATIVE
MAGNESIUM SERPL-MCNC: 1.7 MG/DL — SIGNIFICANT CHANGE UP (ref 1.6–2.6)
MAGNESIUM SERPL-MCNC: 1.9 MG/DL
MAN DIFF?: NORMAL
MCHC RBC-ENTMCNC: 33.1 PG — SIGNIFICANT CHANGE UP (ref 27–34)
MCHC RBC-ENTMCNC: 33.6 GM/DL
MCHC RBC-ENTMCNC: 33.8 PG — SIGNIFICANT CHANGE UP (ref 27–34)
MCHC RBC-ENTMCNC: 34.1 GM/DL — SIGNIFICANT CHANGE UP (ref 32–36)
MCHC RBC-ENTMCNC: 34.4 PG — HIGH (ref 27–34)
MCHC RBC-ENTMCNC: 35.2 GM/DL — SIGNIFICANT CHANGE UP (ref 32–36)
MCHC RBC-ENTMCNC: 35.5 GM/DL — SIGNIFICANT CHANGE UP (ref 32–36)
MCHC RBC-ENTMCNC: 36.5 PG
MCV RBC AUTO: 101.1 FL — HIGH (ref 80–100)
MCV RBC AUTO: 108.7 FL
MCV RBC AUTO: 93.8 FL — SIGNIFICANT CHANGE UP (ref 80–100)
MCV RBC AUTO: 95.2 FL — SIGNIFICANT CHANGE UP (ref 80–100)
MONOCYTES # BLD AUTO: 1.14 K/UL — HIGH (ref 0–0.9)
MONOCYTES # BLD AUTO: 1.16 K/UL
MONOCYTES NFR BLD AUTO: 13.7 %
MONOCYTES NFR BLD AUTO: 8.9 % — SIGNIFICANT CHANGE UP (ref 2–14)
NEUTROPHILS # BLD AUTO: 11.1 K/UL — HIGH (ref 1.8–7.4)
NEUTROPHILS # BLD AUTO: 3.9 K/UL
NEUTROPHILS NFR BLD AUTO: 46 %
NEUTROPHILS NFR BLD AUTO: 86.4 % — HIGH (ref 43–77)
NRBC # BLD: 0 /100 WBCS — SIGNIFICANT CHANGE UP (ref 0–0)
NRBC # BLD: 1 /100 WBCS — HIGH (ref 0–0)
NRBC # BLD: 1 /100 WBCS — HIGH (ref 0–0)
PHOSPHATE SERPL-MCNC: 4.2 MG/DL
PHOSPHATE SERPL-MCNC: 6.1 MG/DL — HIGH (ref 2.5–4.5)
PLATELET # BLD AUTO: 106 K/UL — LOW (ref 150–400)
PLATELET # BLD AUTO: 285 K/UL
PLATELET # BLD AUTO: 83 K/UL — LOW (ref 150–400)
PLATELET # BLD AUTO: 93 K/UL — LOW (ref 150–400)
POTASSIUM SERPL-MCNC: 4.2 MMOL/L — SIGNIFICANT CHANGE UP (ref 3.5–5.3)
POTASSIUM SERPL-MCNC: 4.6 MMOL/L — SIGNIFICANT CHANGE UP (ref 3.5–5.3)
POTASSIUM SERPL-SCNC: 3.9 MMOL/L
POTASSIUM SERPL-SCNC: 4.2 MMOL/L — SIGNIFICANT CHANGE UP (ref 3.5–5.3)
POTASSIUM SERPL-SCNC: 4.6 MMOL/L — SIGNIFICANT CHANGE UP (ref 3.5–5.3)
PROT SERPL-MCNC: 7.6 G/DL
PSA SERPL-MCNC: 0.43 NG/ML
QUANTIFERON TB PLUS MITOGEN MINUS NIL: 3.34 IU/ML
QUANTIFERON TB PLUS NIL: 0.03 IU/ML
QUANTIFERON TB PLUS TB1 MINUS NIL: 0.01 IU/ML
QUANTIFERON TB PLUS TB2 MINUS NIL: 0 IU/ML
RBC # BLD: 1.8 M/UL — LOW (ref 4.2–5.8)
RBC # BLD: 2.31 M/UL — LOW (ref 4.2–5.8)
RBC # BLD: 2.6 M/UL — LOW (ref 4.2–5.8)
RBC # BLD: 2.63 M/UL
RBC # FLD: 17.2 % — HIGH (ref 10.3–14.5)
RBC # FLD: 17.4 % — HIGH (ref 10.3–14.5)
RBC # FLD: 17.7 %
RBC # FLD: 18.4 % — HIGH (ref 10.3–14.5)
RUBV IGG FLD-ACNC: 3.9 INDEX
RUBV IGG SER-IMP: POSITIVE
SODIUM SERPL-SCNC: 130 MMOL/L — LOW (ref 135–145)
SODIUM SERPL-SCNC: 131 MMOL/L — LOW (ref 135–145)
SODIUM SERPL-SCNC: 140 MMOL/L
T GONDII AB SER-IMP: NEGATIVE
T GONDII IGG SER QL: <3 IU/ML
T PALLIDUM AB SER QL IA: NEGATIVE
T3 SERPL-MCNC: 83 NG/DL
T4 FREE SERPL-MCNC: 1.3 NG/DL
TACROLIMUS SERPL-MCNC: 2.4 NG/ML — SIGNIFICANT CHANGE UP
TRIGL SERPL-MCNC: 137 MG/DL
TSH SERPL-ACNC: 2.62 UIU/ML
URATE SERPL-MCNC: 3.1 MG/DL
VZV AB TITR SER: POSITIVE
VZV IGG SER IF-ACNC: 2788 INDEX
WBC # BLD: 12.84 K/UL — HIGH (ref 3.8–10.5)
WBC # BLD: 14.47 K/UL — HIGH (ref 3.8–10.5)
WBC # BLD: 15.85 K/UL — HIGH (ref 3.8–10.5)
WBC # FLD AUTO: 12.84 K/UL — HIGH (ref 3.8–10.5)
WBC # FLD AUTO: 14.47 K/UL — HIGH (ref 3.8–10.5)
WBC # FLD AUTO: 15.85 K/UL — HIGH (ref 3.8–10.5)
WBC # FLD AUTO: 8.46 K/UL

## 2019-12-10 PROCEDURE — 99232 SBSQ HOSP IP/OBS MODERATE 35: CPT | Mod: GC

## 2019-12-10 RX ORDER — VALGANCICLOVIR 450 MG/1
450 TABLET, FILM COATED ORAL
Refills: 0 | Status: DISCONTINUED | OUTPATIENT
Start: 2019-12-10 | End: 2019-12-20

## 2019-12-10 RX ORDER — HYDRALAZINE HCL 50 MG
50 TABLET ORAL
Refills: 0 | Status: DISCONTINUED | OUTPATIENT
Start: 2019-12-10 | End: 2019-12-20

## 2019-12-10 RX ORDER — SODIUM CHLORIDE 9 MG/ML
1000 INJECTION, SOLUTION INTRAVENOUS
Refills: 0 | Status: DISCONTINUED | OUTPATIENT
Start: 2019-12-10 | End: 2019-12-20

## 2019-12-10 RX ORDER — GLUCAGON INJECTION, SOLUTION 0.5 MG/.1ML
1 INJECTION, SOLUTION SUBCUTANEOUS ONCE
Refills: 0 | Status: DISCONTINUED | OUTPATIENT
Start: 2019-12-10 | End: 2019-12-20

## 2019-12-10 RX ORDER — TACROLIMUS 5 MG/1
5 CAPSULE ORAL DAILY
Refills: 0 | Status: DISCONTINUED | OUTPATIENT
Start: 2019-12-10 | End: 2019-12-10

## 2019-12-10 RX ORDER — DEXTROSE 50 % IN WATER 50 %
25 SYRINGE (ML) INTRAVENOUS ONCE
Refills: 0 | Status: DISCONTINUED | OUTPATIENT
Start: 2019-12-10 | End: 2019-12-20

## 2019-12-10 RX ORDER — HYDRALAZINE HCL 50 MG
50 TABLET ORAL ONCE
Refills: 0 | Status: COMPLETED | OUTPATIENT
Start: 2019-12-10 | End: 2019-12-10

## 2019-12-10 RX ORDER — INSULIN LISPRO 100/ML
VIAL (ML) SUBCUTANEOUS
Refills: 0 | Status: DISCONTINUED | OUTPATIENT
Start: 2019-12-10 | End: 2019-12-20

## 2019-12-10 RX ORDER — SENNA PLUS 8.6 MG/1
2 TABLET ORAL AT BEDTIME
Refills: 0 | Status: DISCONTINUED | OUTPATIENT
Start: 2019-12-10 | End: 2019-12-10

## 2019-12-10 RX ORDER — DEXTROSE 50 % IN WATER 50 %
15 SYRINGE (ML) INTRAVENOUS ONCE
Refills: 0 | Status: DISCONTINUED | OUTPATIENT
Start: 2019-12-10 | End: 2019-12-20

## 2019-12-10 RX ORDER — DEXTROSE 50 % IN WATER 50 %
12.5 SYRINGE (ML) INTRAVENOUS ONCE
Refills: 0 | Status: DISCONTINUED | OUTPATIENT
Start: 2019-12-10 | End: 2019-12-20

## 2019-12-10 RX ORDER — TACROLIMUS 5 MG/1
7 CAPSULE ORAL DAILY
Refills: 0 | Status: DISCONTINUED | OUTPATIENT
Start: 2019-12-11 | End: 2019-12-11

## 2019-12-10 RX ADMIN — SODIUM CHLORIDE 3 MILLILITER(S): 9 INJECTION INTRAMUSCULAR; INTRAVENOUS; SUBCUTANEOUS at 05:34

## 2019-12-10 RX ADMIN — BRIMONIDINE TARTRATE 1 DROP(S): 2 SOLUTION/ DROPS OPHTHALMIC at 22:18

## 2019-12-10 RX ADMIN — Medication 50 MILLIGRAM(S): at 06:39

## 2019-12-10 RX ADMIN — TACROLIMUS 5 MILLIGRAM(S): 5 CAPSULE ORAL at 08:43

## 2019-12-10 RX ADMIN — TRAMADOL HYDROCHLORIDE 25 MILLIGRAM(S): 50 TABLET ORAL at 18:57

## 2019-12-10 RX ADMIN — Medication 500000 UNIT(S): at 12:00

## 2019-12-10 RX ADMIN — BRIMONIDINE TARTRATE 1 DROP(S): 2 SOLUTION/ DROPS OPHTHALMIC at 14:34

## 2019-12-10 RX ADMIN — DORZOLAMIDE HYDROCHLORIDE 1 DROP(S): 20 SOLUTION/ DROPS OPHTHALMIC at 14:35

## 2019-12-10 RX ADMIN — LATANOPROST 1 DROP(S): 0.05 SOLUTION/ DROPS OPHTHALMIC; TOPICAL at 22:17

## 2019-12-10 RX ADMIN — SODIUM CHLORIDE 3 MILLILITER(S): 9 INJECTION INTRAMUSCULAR; INTRAVENOUS; SUBCUTANEOUS at 14:57

## 2019-12-10 RX ADMIN — VALGANCICLOVIR 450 MILLIGRAM(S): 450 TABLET, FILM COATED ORAL at 12:00

## 2019-12-10 RX ADMIN — TRAMADOL HYDROCHLORIDE 50 MILLIGRAM(S): 50 TABLET ORAL at 09:52

## 2019-12-10 RX ADMIN — Medication 500000 UNIT(S): at 23:18

## 2019-12-10 RX ADMIN — DORZOLAMIDE HYDROCHLORIDE 1 DROP(S): 20 SOLUTION/ DROPS OPHTHALMIC at 22:17

## 2019-12-10 RX ADMIN — FAMOTIDINE 20 MILLIGRAM(S): 10 INJECTION INTRAVENOUS at 12:00

## 2019-12-10 RX ADMIN — DORZOLAMIDE HYDROCHLORIDE 1 DROP(S): 20 SOLUTION/ DROPS OPHTHALMIC at 05:30

## 2019-12-10 RX ADMIN — Medication 60 MILLIGRAM(S): at 17:09

## 2019-12-10 RX ADMIN — TRAMADOL HYDROCHLORIDE 50 MILLIGRAM(S): 50 TABLET ORAL at 08:47

## 2019-12-10 RX ADMIN — Medication 50 MILLIGRAM(S): at 17:28

## 2019-12-10 RX ADMIN — TRAMADOL HYDROCHLORIDE 25 MILLIGRAM(S): 50 TABLET ORAL at 17:57

## 2019-12-10 RX ADMIN — Medication 25 MILLIGRAM(S): at 05:31

## 2019-12-10 RX ADMIN — Medication 500000 UNIT(S): at 17:09

## 2019-12-10 RX ADMIN — MYCOPHENOLATE MOFETIL 1 GRAM(S): 250 CAPSULE ORAL at 17:09

## 2019-12-10 RX ADMIN — Medication 500000 UNIT(S): at 05:32

## 2019-12-10 RX ADMIN — Medication 60 MILLIGRAM(S): at 05:32

## 2019-12-10 RX ADMIN — Medication 1 TABLET(S): at 12:00

## 2019-12-10 RX ADMIN — BRIMONIDINE TARTRATE 1 DROP(S): 2 SOLUTION/ DROPS OPHTHALMIC at 05:31

## 2019-12-10 RX ADMIN — SODIUM CHLORIDE 3 MILLILITER(S): 9 INJECTION INTRAMUSCULAR; INTRAVENOUS; SUBCUTANEOUS at 22:52

## 2019-12-10 RX ADMIN — MYCOPHENOLATE MOFETIL 1 GRAM(S): 250 CAPSULE ORAL at 06:39

## 2019-12-10 RX ADMIN — Medication 25 MILLIGRAM(S): at 22:15

## 2019-12-10 NOTE — DIETITIAN INITIAL EVALUATION ADULT. - ADD RECOMMEND
1) Continue Renal diet. 2) Monitor graft function, urine output, ongoing need for HD. 3) Reinforce post-transplant nutrition therapy and food safety guidelines. 4) Discharge diet: Continue as above. Recommend follow up visit with Transplant MD and outpatient RD for dietary modifications as warranted.

## 2019-12-10 NOTE — PROGRESS NOTE ADULT - PROBLEM SELECTOR PLAN 1
Patient s/p DDRT on 12/8/2019. Remains oliguric but electrolytes stable. Will arrange for HD today due to fluid overload. Patient on Envarsus 5 mg PO, Cellcept 1 gm BID, and steroid taper. Patient on antibiotic prophylaxis with Valcyte, Bactrim, and Nystatin. Check Prograf level prior to next AM dose.

## 2019-12-10 NOTE — DIETITIAN INITIAL EVALUATION ADULT. - PHYSICAL APPEARANCE
ht: 5 feet 11 inches, admit wt: 141pounds, BMI: 19.7 Kg/m2, IBW: 172 pounds (+/- 10%), 82% IBW/other (specify) Edema: 1+ right/left ankle  Skin: no pressure injuries noted per nursing flowsheet  Nutrition Focused Physical Exam: ht: 5 feet 11 inches, admit wt: 141pounds, BMI: 19.7 Kg/m2, IBW: 172 pounds (+/- 10%), 82% IBW/other (specify)/well nourished Edema: 1+ right/left ankle  Skin: no pressure injuries noted per nursing flowsheet  Nutrition Focused Physical Exam: Pt appears slender but well developed with no signs of muscle or fat depletion.

## 2019-12-10 NOTE — DIETITIAN INITIAL EVALUATION ADULT. - PERTINENT LABORATORY DATA
12-10 @ 13:10:  Hemoglobin 8.6<L>, Hematocrit 24.4<L>  12-10 @ 11:48: Sodium 131<L>, Potassium 4.6, Chloride 89<L>, Calcium 8.0<L>, BUN 39<H>, Creatinine 7.29<H>, <H>,   12-10 @ 06:45: Hemoglobin 7.8<L>, Hematocrit 22.0<L>  12-10 @ 06:37: Sodium 130<L>, Potassium 4.2, Chloride 87<L>, Calcium 8.1<L>, Magnesium 1.7, Phosphorus 6.1<H>, BUN 34<H>, Creatinine 7.21<H>, <H>  12-10 @ 00:39: Hemoglobin 6.2<LL>, Hematocrit 18.2<LL>

## 2019-12-10 NOTE — PROGRESS NOTE ADULT - SUBJECTIVE AND OBJECTIVE BOX
[facility] DIVISION OF KIDNEY DISEASES AND HYPERTENSION -- FOLLOW UP NOTE  --------------------------------------------------------------------------------  Chief Complaint: s/p DDRT    24 hour events/subjective: Patient evaluated at bedside, in no acute distress. Reports feeling some chest heaviness and SOB. Noted to be 2L positive fluid balance.    PAST HISTORY  --------------------------------------------------------------------------------  No significant changes to PMH, PSH, FHx, SHx, unless otherwise noted    ALLERGIES & MEDICATIONS  --------------------------------------------------------------------------------  Allergies    coconut (Anaphylaxis)  morphine (Pruritus; Rash)    Intolerances    Standing Inpatient Medications  brimonidine 0.2% Ophthalmic Solution 1 Drop(s) Left EYE three times a day  chlorhexidine 4% Liquid 1 Application(s) Topical <User Schedule>  dorzolamide 2% Ophthalmic Solution 1 Drop(s) Left EYE three times a day  famotidine    Tablet 20 milliGRAM(s) Oral daily  hydrALAZINE 50 milliGRAM(s) Oral two times a day  latanoprost 0.005% Ophthalmic Solution 1 Drop(s) Left EYE at bedtime  methylPREDNISolone sodium succinate Injectable 60 milliGRAM(s) IV Push two times a day  metoprolol tartrate 25 milliGRAM(s) Oral two times a day  mycophenolate mofetil 1 Gram(s) Oral every 12 hours  nystatin    Suspension [id number] Unit(s) Swish and Swallow four times a day  sodium chloride 0.9% lock flush 3 milliLiter(s) IV Push every 8 hours  tacrolimus ER Tablet (ENVARSUS XR) 5 milliGRAM(s) Oral daily  trimethoprim   80 mG/sulfamethoxazole 400 mG 1 Tablet(s) Oral daily  valGANciclovir 450 milliGRAM(s) Oral daily  valGANciclovir 450 milliGRAM(s) Oral <User Schedule>    REVIEW OF SYSTEMS  --------------------------------------------------------------------------------  Gen: No fatigue, fevers/chills, weakness  Skin: No rashes  Head/Eyes/Ears/Mouth: No headache;No sore throat  Respiratory: No dyspnea, cough,   CV: No chest pain, PND, orthopnea  GI: No abdominal pain, diarrhea, constipation, nausea, vomiting  Transplant: No pain  : No increased frequency, dysuria, hematuria, nocturia  MSK: No joint pain/swelling; no back pain; no edema  Neuro: No dizziness/lightheadedness, weakness, seizures, numbness, tingling  Psych: No significant nervousness, anxiety, stress, depression    All other systems were reviewed and are negative, except as noted.    VITALS/PHYSICAL EXAM  --------------------------------------------------------------------------------  T(C): 36.6 (12-10-19 @ 09:00), Max: 36.8 (12-10-19 @ 07:00)  HR: 70 (12-10-19 @ 09:00) (70 - 77)  BP: 179/101 (12-10-19 @ 09:00) (138/76 - 179/101)  RR: 18 (12-10-19 @ 09:00) (18 - 18)  SpO2: 98% (12-10-19 @ 09:00) (98% - 99%)  Wt(kg): --    12-09-19 @ 07:01  -  12-10-19 @ 07:00  --------------------------------------------------------  IN: 2690 mL / OUT: 79 mL / NET: 2611 mL    12-10-19 @ 07:01  -  12-10-19 @ 13:37  --------------------------------------------------------  IN: 440 mL / OUT: 10 mL / NET: 430 mL    Physical Exam:  	Gen: NAD, well-appearing  	HEENT: PERRL, supple neck, clear oropharynx  	Pulm: CTA B/L  	CV: RRR, S1S2; no rub  	Back: No spinal or CVA tenderness; no sacral edema  	Abd: +BS, soft, nontender/nondistended              Transplant: No tenderness, swelling  	: No suprapubic tenderness  	UE: Warm, FROM, intact strength; no edema; no asterixis  	LE: Warm, FROM, intact strength; no edema  	Neuro: No focal deficits, intact gait  	Psych: Normal affect and mood  	Skin: Warm, without rashes    LABS/STUDIES  --------------------------------------------------------------------------------              8.6    15.85 >-----------<  83       [12-10-19 @ 13:10]              24.4     131  |  89  |  39  ----------------------------<  151      [12-10-19 @ 11:48]  4.6   |  21  |  7.29        Ca     8.0     [12-10-19 @ 11:48]      Mg     1.7     [12-10-19 @ 06:37]      Phos  6.1     [12-10-19 @ 06:37]    TPro  6.1  /  Alb  3.1  /  TBili  1.0  /  DBili  x   /  AST  31  /  ALT  18  /  AlkPhos  101  [12-08-19 @ 19:58]    PT/INR: PT 11.7 , INR 1.02       [12-09-19 @ 07:19]  PTT: 26.2       [12-09-19 @ 07:19]          [12-10-19 @ 06:37]    Creatinine Trend:  SCr 7.29 [12-10 @ 11:48]  SCr 7.21 [12-10 @ 06:37]  SCr 5.79 [12-09 @ 00:45]  SCr 5.73 [12-08 @ 19:58]  SCr 5.53 [12-08 @ 11:49]    Tacrolimus (), Serum: 2.4 ng/mL (12-10 @ 07:18)  Tacrolimus (), Serum: <2.0 ng/mL (12-09 @ 07:28)

## 2019-12-10 NOTE — PROGRESS NOTE ADULT - ASSESSMENT
48 year old male with PMH ESRD on HD T/Th/Sat via L arm fistula since Aug 2013 (at [facility],) s/p DDRT on 12/8/2019.    Recipient info:   CPRA:    o%  ABO:      A+  CMVAb:  Positive  EBVIgG Status:  Positive  Last HD:  12/7/2019    Donor ID:  [id number]  Match: [id number]  OPO: NYRT  Age:  49  ABO:  A  KDPI 81%  COD:  Anoxia  X Clamp Time:  12/7/2019 1538.  Medical Hx: DM, HTN, HLD, obesity BMI 53, CAD, CABAGX3  Terminal Cr:  1/1.8/1.7  CMV- Neg EBV- Neg

## 2019-12-10 NOTE — DIETITIAN INITIAL EVALUATION ADULT. - REASON INDICATOR FOR ASSESSMENT
Nutrition Assessment warranted for status post kidney transplant  Information obtained from: patient, medical record  Per chart: "48 year old male with PMH ESRD on HD T/Th/Sat via L arm fistula since Aug 2013 (at [facility],) s/p DDRT on 12/8/2019." Nutrition Assessment warranted for status post kidney transplant  Information obtained from: patient, medical record. Pt in visible discomfort, declined full interview/education at this time.  Per chart: "48 year old male with PMH ESRD on HD T/Th/Sat via L arm fistula since Aug 2013 (at [facility],) s/p DDRT on 12/8/2019."

## 2019-12-10 NOTE — PROGRESS NOTE ADULT - ASSESSMENT
49M with significant PMHx of ESRD on HD  (no bx, since 2013 with  TTS via LUE AVF, anuric), HTN, Gout, Glaucoma, NET of pancreas (s/p robotic distal panc/splenectomy at [place] 2015 by Dr. Young, followed by Dr. Raphael, [facility] Oncologist), RA with hand contractures, now s/p DDRT on 12/8/19 POD#1.    Kidney replaced by transplant:   - POD#2  s/p 2 units PRBC, f/u CBC  - On PRN Tylenol and tramadol for pain, Monitor and manage pain  - Oliguric made 79ml overnight, keep Kennedy in place  - D/C IVF  - +multiple BM pt with h/o daily multiple BM, will monitor, hold BM regimen   - Robles Po diet  - Pt encouraged to ambulate  - Dressing changed  - Plan to d/c TLC once have PIV placed  - Plan for HD today per renal    Immunosuppressive management encounter following kidney transplant:    Continue Tacrolimus, cellcept, steroid taper, Simulect induction next dose on day 4  PPX: Nystatin S&S, bactrim, and change Valcyte to 2xweek   F/U Tacrolimus level daily, tacro goal 8-10  Monitor closely.     HTN (Hypertension).  Plan:   Monitor bP.

## 2019-12-10 NOTE — PROGRESS NOTE ADULT - PROBLEM SELECTOR PLAN 3
BP above target range. Started on Lopressor 25 mg BID and Hydralazine 50 mg BID. Would start Nifedipine 30 mg PO as patient was requesting a bowel slowing agent and will control BP as well. Monitor BP on current BP medication. Low salt diet.

## 2019-12-10 NOTE — DIETITIAN INITIAL EVALUATION ADULT. - OTHER INFO
INFORMATION PTA  Diet PTA: pt interview pending  Nutrition status PTA: Pt with ESRD on HD since 2013  Nutrition Supplements PTA: Renal Caps vit B complex with vit C and folic acid  Food Allergies: coconut  Weight History PTA:  Other Information:    INFORMATION THIS ADMISSION  Urine Output x 24-hours: 79ml  Last BM: 12/9 x3, 12/10 x2; bowel regimen on hold  Last HD: 12/10 for +2L fluid balance  Other  Information:  Therapeutic Diet Education Provided:  Education Handouts Provided: INFORMATION PTA  Diet PTA: Unable to obtain diet history at this time; pt declined further interview  Nutrition status PTA: Pt with ESRD on HD since 2013  Nutrition Supplements PTA: Renal Caps vit B complex with vit C and folic acid  Food Allergies: coconut  Weight History PTA: Unavailable    INFORMATION THIS ADMISSION  Urine Output x 24-hours: 79ml  Last BM: 12/9 x3, 12/10 x2; bowel regimen on hold  Last HD: 12/10 for +2L fluid balance  Therapeutic Diet Education Provided: Pt declined post-transplant nutrition education and Food Safety education at this time. RD will return for full education when pt is receptive.  Education Handouts Provided: USDA Food Safety handbook

## 2019-12-10 NOTE — DIETITIAN INITIAL EVALUATION ADULT. - PERTINENT MEDS FT
MEDICATIONS  (STANDING):  brimonidine 0.2% Ophthalmic Solution 1 Drop(s) Left EYE three times a day  chlorhexidine 4% Liquid 1 Application(s) Topical <User Schedule>  dorzolamide 2% Ophthalmic Solution 1 Drop(s) Left EYE three times a day  famotidine    Tablet 20 milliGRAM(s) Oral daily  hydrALAZINE 50 milliGRAM(s) Oral two times a day  latanoprost 0.005% Ophthalmic Solution 1 Drop(s) Left EYE at bedtime  methylPREDNISolone sodium succinate Injectable 60 milliGRAM(s) IV Push two times a day  metoprolol tartrate 25 milliGRAM(s) Oral two times a day  mycophenolate mofetil 1 Gram(s) Oral every 12 hours  nystatin    Suspension [id number] Unit(s) Swish and Swallow four times a day  sodium chloride 0.9% lock flush 3 milliLiter(s) IV Push every 8 hours  tacrolimus ER Tablet (ENVARSUS XR) 5 milliGRAM(s) Oral daily  trimethoprim   80 mG/sulfamethoxazole 400 mG 1 Tablet(s) Oral daily  valGANciclovir 450 milliGRAM(s) Oral daily  valGANciclovir 450 milliGRAM(s) Oral <User Schedule>

## 2019-12-10 NOTE — PROGRESS NOTE ADULT - SUBJECTIVE AND OBJECTIVE BOX
Transplant Surgery - Multidisciplinary Rounds  --------------------------------------------------------------  DDRT POD#2    Present:  Patient seen with multidisciplinary team icluding Transplant Surgeon: Dr. Velásquez, Dr. Poe, Dr. Cruz, Dr. Espinal, Dr. Be. Transplant Nephrologist: Dr. Lowery, Dr. Puckett, renal fellow Chin, Transplant Coordinators: Sekou Ramirez ([place]) Kendal Massey, Armen Neumann (Living Donor Coordinator), Social Work: Abiola Canales, Danisha Forman. Pharmacist: Diana Gonzalez.   Nutrition: Iman Armando, NP Stefan, PA, [facility], Surgical resident Everton Walker, PGY3, and examined by Dr. Espinal. Disciplines not in attendance will be notified of the plan.      HPI: 49M with significant PMHx of ESRD on HD  (no bx, since 2013 with  TTS via LUE AVF, anuric), HTN, Gout, Glaucoma, NET of pancreas (s/p robotic distal panc/splenectomy at [place] 2015 by Dr. Young, followed by Dr. Raphael, [facility] Oncologist), RA with hand contractures, now s/p DDRT on 12/8/19 POD#1.    Last colonoscopy: clear 2017  Onc: cleared by [facility] 2019 (low grade NET, risk of HD greater than recurrence of low grade NET, recent PET/MRI neg)    Reccipient info:   CPRA:    o%  ABO:      A+  CMVAb:  Positive  EBVIgG Status:  Positive  Last HD:  12/7/2019    Donor ID:  [id number]  Match: [id number]  OPO: NYRT  Age:  49  ABO:  A  KDPI 81%  COD:  Anoxia  X Clamp Time:  12/7/2019 1538.  Medical Hx: DM, HTN, HLD, obesity BMI 53, CAD, CABAGX3  Terminal Cr:  1/1.8/1.7  CMV- Neg EBV- Neg    OR:    DDRT to right iliac fossa. Simulect induction. Two arteries anastomosed to a single cuff on the back table. One vein, one ureter. Ureteral anastomosis performed over a double J stent, which was sutured to the martinez. Cold ischemia time 25.5 hours.    Interval Events:  Drop in H&H overnight s/p 2u prbc  BO output serosanguineous  Post op US with no stenosis   Hypertensive overnight started on home meds lopressor and hydralazine    PE: See below  Potential Discharge date: Pending clinical evaluation  Education: Medication and post op care    Plan of care: See below       MEDICATIONS  (STANDING):  brimonidine 0.2% Ophthalmic Solution 1 Drop(s) Left EYE three times a day  chlorhexidine 4% Liquid 1 Application(s) Topical <User Schedule>  dorzolamide 2% Ophthalmic Solution 1 Drop(s) Left EYE three times a day  famotidine    Tablet 20 milliGRAM(s) Oral daily  hydrALAZINE 50 milliGRAM(s) Oral two times a day  latanoprost 0.005% Ophthalmic Solution 1 Drop(s) Left EYE at bedtime  methylPREDNISolone sodium succinate Injectable 60 milliGRAM(s) IV Push two times a day  metoprolol tartrate 25 milliGRAM(s) Oral two times a day  mycophenolate mofetil 1 Gram(s) Oral every 12 hours  nystatin    Suspension [id number] Unit(s) Swish and Swallow four times a day  sodium chloride 0.9% lock flush 3 milliLiter(s) IV Push every 8 hours  tacrolimus ER Tablet (ENVARSUS XR) 5 milliGRAM(s) Oral daily  trimethoprim   80 mG/sulfamethoxazole 400 mG 1 Tablet(s) Oral daily  valGANciclovir 450 milliGRAM(s) Oral daily  valGANciclovir 450 milliGRAM(s) Oral <User Schedule>    MEDICATIONS  (PRN):  acetaminophen   Tablet .. 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  Carpal tunnel syndrome  Brain cyst: had MRI recently- now gone  Anemia  Gastric ulcer: Long time ago  Rheumatoid arthritis  Breakdown (mechanical) of penile (implanted) prosthesis, sequela  End-stage renal disease (ESRD): PERMACATH PLACEMENT  Abscess of left buttock: s/p I &amp; D  AV fistula: placement left lower arm  S/P cystoscopy: stent placement &amp; removal for kidney stones, lithiotripsy  s/p Lt Carpal tunnel: 2011      Vital Signs Last 24 Hrs  T(C): 36.6 (10 Dec 2019 09:00), Max: 36.8 (10 Dec 2019 07:00)  T(F): 97.9 (10 Dec 2019 09:00), Max: 98.3 (10 Dec 2019 07:00)  HR: 70 (10 Dec 2019 09:00) (69 - 77)  BP: 179/101 (10 Dec 2019 09:00) (138/76 - 179/101)  BP(mean): 117 (10 Dec 2019 01:00) (100 - 122)  RR: 18 (10 Dec 2019 09:00) (18 - 18)  SpO2: 98% (10 Dec 2019 09:00) (98% - 99%)    I&O's Summary    09 Dec 2019 07:01  -  10 Dec 2019 07:00  --------------------------------------------------------  IN: 2690 mL / OUT: 79 mL / NET: 2611 mL    10 Dec 2019 07:01  -  10 Dec 2019 11:37  --------------------------------------------------------  IN: 320 mL / OUT: 3 mL / NET: 317 mL                              7.8    14.47 )-----------( 93       ( 10 Dec 2019 06:45 )             22.0     12-10    130<L>  |  87<L>  |  34<H>  ----------------------------<  165<H>  4.2   |  20<L>  |  7.21<H>    Ca    8.1<L>      10 Dec 2019 06:37  Phos  6.1     12-10  Mg     1.7     12-10    TPro  6.1  /  Alb  3.1<L>  /  TBili  1.0  /  DBili  x   /  AST  31  /  ALT  18  /  AlkPhos  101  12-08    Tacrolimus (), Serum: 2.4 ng/mL (12-10 @ 07:18)                REVIEW OF SYSTEMS  --------------------------------------------------------------------------------  Gen: No weight changes, fatigue, fevers/chills, weakness  Skin: No rashes  Head/Eyes/Ears/Mouth: No headache; Normal hearing; Normal vision w/o blurriness; No sinus pain/discomfort, sore throat  Respiratory: No dyspnea, cough, wheezing, hemoptysis  CV: No chest pain, PND, orthopnea  GI: No abdominal pain, diarrhea, constipation, nausea, vomiting, melena, hematochezia  : No increased frequency, dysuria, hematuria, nocturia  MSK: No joint pain/swelling; no back pain; no edema  Neuro: No dizziness/lightheadedness, weakness, seizures, numbness, tingling  Heme: No easy bruising or bleeding  Endo: No heat/cold intolerance  Psych: No significant nervousness, anxiety, stress, depression  All other systems were reviewed and are negative, except as noted.      PHYSICAL EXAM:  Constitutional: Well developed / well nourished  Eyes: Anicteric, PERRLA  ENMT: nc/at  Neck: Supple, RT IJ TLC C/D/I no sign of erythema or infection noted  Respiratory: CTA B/L  Cardiovascular: RRR  Gastrointestinal: Soft abdomen, NT, ND  Genitourinary:  Martinez in place  Extremities: SCD's in place and working bilaterally  Vascular: Palpable dp pulses bilaterally  Neurological: A&O x3  Skin: Wound dressing changed with dry quBO albrecht-SS  Musculoskeletal: Moving all extremities  Psychiatric: Responsive

## 2019-12-11 LAB
ANION GAP SERPL CALC-SCNC: 20 MMOL/L — HIGH (ref 5–17)
BASOPHILS # BLD AUTO: 0 K/UL — SIGNIFICANT CHANGE UP (ref 0–0.2)
BASOPHILS NFR BLD AUTO: 0 % — SIGNIFICANT CHANGE UP (ref 0–2)
BUN SERPL-MCNC: 24 MG/DL — HIGH (ref 7–23)
CALCIUM SERPL-MCNC: 8.5 MG/DL — SIGNIFICANT CHANGE UP (ref 8.4–10.5)
CHLORIDE SERPL-SCNC: 90 MMOL/L — LOW (ref 96–108)
CO2 SERPL-SCNC: 24 MMOL/L — SIGNIFICANT CHANGE UP (ref 22–31)
CREAT SERPL-MCNC: 4.92 MG/DL — HIGH (ref 0.5–1.3)
EOSINOPHIL # BLD AUTO: 0 K/UL — SIGNIFICANT CHANGE UP (ref 0–0.5)
EOSINOPHIL NFR BLD AUTO: 0 % — SIGNIFICANT CHANGE UP (ref 0–6)
GLUCOSE BLDC GLUCOMTR-MCNC: 129 MG/DL — HIGH (ref 70–99)
GLUCOSE BLDC GLUCOMTR-MCNC: 133 MG/DL — HIGH (ref 70–99)
GLUCOSE BLDC GLUCOMTR-MCNC: 138 MG/DL — HIGH (ref 70–99)
GLUCOSE BLDC GLUCOMTR-MCNC: 150 MG/DL — HIGH (ref 70–99)
GLUCOSE SERPL-MCNC: 120 MG/DL — HIGH (ref 70–99)
HBA1C BLD-MCNC: 5 % — SIGNIFICANT CHANGE UP (ref 4–5.6)
HCT VFR BLD CALC: 23.4 % — LOW (ref 39–50)
HGB BLD-MCNC: 8.2 G/DL — LOW (ref 13–17)
LDH SERPL L TO P-CCNC: 517 U/L — HIGH (ref 50–242)
LYMPHOCYTES # BLD AUTO: 0.66 K/UL — LOW (ref 1–3.3)
LYMPHOCYTES # BLD AUTO: 4.4 % — LOW (ref 13–44)
MAGNESIUM SERPL-MCNC: 1.9 MG/DL — SIGNIFICANT CHANGE UP (ref 1.6–2.6)
MCHC RBC-ENTMCNC: 33.2 PG — SIGNIFICANT CHANGE UP (ref 27–34)
MCHC RBC-ENTMCNC: 35 GM/DL — SIGNIFICANT CHANGE UP (ref 32–36)
MCV RBC AUTO: 94.7 FL — SIGNIFICANT CHANGE UP (ref 80–100)
MONOCYTES # BLD AUTO: 0.91 K/UL — HIGH (ref 0–0.9)
MONOCYTES NFR BLD AUTO: 6.1 % — SIGNIFICANT CHANGE UP (ref 2–14)
NEUTROPHILS # BLD AUTO: 13.41 K/UL — HIGH (ref 1.8–7.4)
NEUTROPHILS NFR BLD AUTO: 87.8 % — HIGH (ref 43–77)
NRBC # BLD: 0 /100 WBCS — SIGNIFICANT CHANGE UP (ref 0–0)
PHOSPHATE SERPL-MCNC: 4.8 MG/DL — HIGH (ref 2.5–4.5)
PLATELET # BLD AUTO: 72 K/UL — LOW (ref 150–400)
POTASSIUM SERPL-MCNC: 3.7 MMOL/L — SIGNIFICANT CHANGE UP (ref 3.5–5.3)
POTASSIUM SERPL-SCNC: 3.7 MMOL/L — SIGNIFICANT CHANGE UP (ref 3.5–5.3)
RBC # BLD: 2.47 M/UL — LOW (ref 4.2–5.8)
RBC # FLD: 18 % — HIGH (ref 10.3–14.5)
SODIUM SERPL-SCNC: 134 MMOL/L — LOW (ref 135–145)
TACROLIMUS SERPL-MCNC: 3.7 NG/ML — SIGNIFICANT CHANGE UP
WBC # BLD: 14.98 K/UL — HIGH (ref 3.8–10.5)
WBC # FLD AUTO: 14.98 K/UL — HIGH (ref 3.8–10.5)

## 2019-12-11 PROCEDURE — 99232 SBSQ HOSP IP/OBS MODERATE 35: CPT | Mod: GC

## 2019-12-11 RX ORDER — ACETAMINOPHEN 500 MG
1000 TABLET ORAL ONCE
Refills: 0 | Status: COMPLETED | OUTPATIENT
Start: 2019-12-11 | End: 2019-12-11

## 2019-12-11 RX ORDER — TACROLIMUS 5 MG/1
8 CAPSULE ORAL DAILY
Refills: 0 | Status: DISCONTINUED | OUTPATIENT
Start: 2019-12-11 | End: 2019-12-12

## 2019-12-11 RX ORDER — LIDOCAINE HCL 20 MG/ML
10 VIAL (ML) INJECTION THREE TIMES A DAY
Refills: 0 | Status: DISCONTINUED | OUTPATIENT
Start: 2019-12-11 | End: 2019-12-20

## 2019-12-11 RX ADMIN — Medication 1 TABLET(S): at 11:46

## 2019-12-11 RX ADMIN — TRAMADOL HYDROCHLORIDE 25 MILLIGRAM(S): 50 TABLET ORAL at 11:58

## 2019-12-11 RX ADMIN — DORZOLAMIDE HYDROCHLORIDE 1 DROP(S): 20 SOLUTION/ DROPS OPHTHALMIC at 21:17

## 2019-12-11 RX ADMIN — Medication 500000 UNIT(S): at 05:45

## 2019-12-11 RX ADMIN — CHLORHEXIDINE GLUCONATE 1 APPLICATION(S): 213 SOLUTION TOPICAL at 08:17

## 2019-12-11 RX ADMIN — TRAMADOL HYDROCHLORIDE 25 MILLIGRAM(S): 50 TABLET ORAL at 13:00

## 2019-12-11 RX ADMIN — Medication 25 MILLIGRAM(S): at 05:45

## 2019-12-11 RX ADMIN — DORZOLAMIDE HYDROCHLORIDE 1 DROP(S): 20 SOLUTION/ DROPS OPHTHALMIC at 05:45

## 2019-12-11 RX ADMIN — Medication 50 MILLIGRAM(S): at 05:46

## 2019-12-11 RX ADMIN — Medication 30 MILLIGRAM(S): at 17:10

## 2019-12-11 RX ADMIN — BRIMONIDINE TARTRATE 1 DROP(S): 2 SOLUTION/ DROPS OPHTHALMIC at 15:14

## 2019-12-11 RX ADMIN — SODIUM CHLORIDE 3 MILLILITER(S): 9 INJECTION INTRAMUSCULAR; INTRAVENOUS; SUBCUTANEOUS at 22:35

## 2019-12-11 RX ADMIN — TACROLIMUS 7 MILLIGRAM(S): 5 CAPSULE ORAL at 08:28

## 2019-12-11 RX ADMIN — Medication 500000 UNIT(S): at 11:46

## 2019-12-11 RX ADMIN — MYCOPHENOLATE MOFETIL 1 GRAM(S): 250 CAPSULE ORAL at 05:48

## 2019-12-11 RX ADMIN — BRIMONIDINE TARTRATE 1 DROP(S): 2 SOLUTION/ DROPS OPHTHALMIC at 05:44

## 2019-12-11 RX ADMIN — Medication 10 MILLILITER(S): at 11:47

## 2019-12-11 RX ADMIN — DORZOLAMIDE HYDROCHLORIDE 1 DROP(S): 20 SOLUTION/ DROPS OPHTHALMIC at 15:14

## 2019-12-11 RX ADMIN — TRAMADOL HYDROCHLORIDE 25 MILLIGRAM(S): 50 TABLET ORAL at 20:17

## 2019-12-11 RX ADMIN — SODIUM CHLORIDE 3 MILLILITER(S): 9 INJECTION INTRAMUSCULAR; INTRAVENOUS; SUBCUTANEOUS at 06:22

## 2019-12-11 RX ADMIN — TRAMADOL HYDROCHLORIDE 25 MILLIGRAM(S): 50 TABLET ORAL at 21:06

## 2019-12-11 RX ADMIN — Medication 400 MILLIGRAM(S): at 21:06

## 2019-12-11 RX ADMIN — MYCOPHENOLATE MOFETIL 1 GRAM(S): 250 CAPSULE ORAL at 18:02

## 2019-12-11 RX ADMIN — Medication 25 MILLIGRAM(S): at 17:10

## 2019-12-11 RX ADMIN — BRIMONIDINE TARTRATE 1 DROP(S): 2 SOLUTION/ DROPS OPHTHALMIC at 21:16

## 2019-12-11 RX ADMIN — SODIUM CHLORIDE 3 MILLILITER(S): 9 INJECTION INTRAMUSCULAR; INTRAVENOUS; SUBCUTANEOUS at 13:43

## 2019-12-11 RX ADMIN — Medication 1000 MILLIGRAM(S): at 22:00

## 2019-12-11 RX ADMIN — Medication 30 MILLIGRAM(S): at 05:56

## 2019-12-11 RX ADMIN — Medication 500000 UNIT(S): at 17:10

## 2019-12-11 RX ADMIN — LATANOPROST 1 DROP(S): 0.05 SOLUTION/ DROPS OPHTHALMIC; TOPICAL at 21:17

## 2019-12-11 RX ADMIN — FAMOTIDINE 20 MILLIGRAM(S): 10 INJECTION INTRAVENOUS at 11:46

## 2019-12-11 RX ADMIN — Medication 50 MILLIGRAM(S): at 17:10

## 2019-12-11 NOTE — PROGRESS NOTE ADULT - PROBLEM SELECTOR PLAN 3
BP above target range. Started on Lopressor 25 mg BID and Hydralazine 50 mg BID. Would add Nifedipine 30 mg PO as patient was requesting a bowel slowing agent and will control BP as well. Monitor BP on current BP medication. Low salt diet.

## 2019-12-11 NOTE — PROGRESS NOTE ADULT - SUBJECTIVE AND OBJECTIVE BOX
Transplant Surgery - Multidisciplinary Rounds  --------------------------------------------------------------  DDRT POD #3    Present:  Patient seen with multidisciplinary team icluding Transplant Surgeon: Dr. Espinal,  Transplant Nephrologist: Dr. Lowery, , renal fellow Chin, Transplant Coordinators: Sekou Ramirez ([place]) Kendal Massey, Armen Neumann (Living Donor Coordinator), Social Work: Abiola Canales, Danisha Forman. Pharmacist: Diana Gonzalez.   Nutrition: MAGALIS Boston PA, [facility], Surgical resident Everton Walker, PGY3, and examined by Dr. Espinal. Disciplines not in attendance will be notified of the plan.      HPI: 49M with significant PMHx of ESRD on HD  (no bx, since 2013 with  TTS via LUE AVF, anuric), HTN, Gout, Glaucoma, NET of pancreas (s/p robotic distal panc/splenectomy at [place] 2015 by Dr. Young, followed by Dr. Raphael, [facility] Oncologist), RA with hand contractures, now s/p DDRT on 12/8/19 POD#1.    Last colonoscopy: clear 2017  Onc: cleared by [facility] 2019 (low grade NET, risk of HD greater than recurrence of low grade NET, recent PET/MRI neg)    Reccipient info:   CPRA:    o%  ABO:      A+  CMVAb:  Positive  EBVIgG Status:  Positive  Last HD:  12/7/2019    Donor ID:  [id number]  Match: [id number]  OPO: NYRT  Age:  49  ABO:  A  KDPI 81%  COD:  Anoxia  X Clamp Time:  12/7/2019 1538.  Medical Hx: DM, HTN, HLD, obesity BMI 53, CAD, CABAGX3  Terminal Cr:  1/1.8/1.7  CMV- Neg EBV- Neg    OR:    DDRT to right iliac fossa. Simulect induction. Two arteries anastomosed to a single cuff on the back table. One vein, one ureter. Ureteral anastomosis performed over a double J stent, which was sutured to the martinez. Cold ischemia time 25.5 hours.    Interval Events:  - yesterday c/o  SOB HD 2 L removed   minimal urine output   BO output serosanguineous  Post op US with no stenosis       PE: See below  Potential Discharge date: Pending clinical evaluation  Education: Medication and post op care    Plan of care: See below       MEDICATIONS  (STANDING):  brimonidine 0.2% Ophthalmic Solution 1 Drop(s) Left EYE three times a day  chlorhexidine 4% Liquid 1 Application(s) Topical <User Schedule>  dorzolamide 2% Ophthalmic Solution 1 Drop(s) Left EYE three times a day  famotidine    Tablet 20 milliGRAM(s) Oral daily  hydrALAZINE 50 milliGRAM(s) Oral two times a day  latanoprost 0.005% Ophthalmic Solution 1 Drop(s) Left EYE at bedtime  methylPREDNISolone sodium succinate Injectable 60 milliGRAM(s) IV Push two times a day  metoprolol tartrate 25 milliGRAM(s) Oral two times a day  mycophenolate mofetil 1 Gram(s) Oral every 12 hours  nystatin    Suspension [id number] Unit(s) Swish and Swallow four times a day  sodium chloride 0.9% lock flush 3 milliLiter(s) IV Push every 8 hours  tacrolimus ER Tablet (ENVARSUS XR) 5 milliGRAM(s) Oral daily  trimethoprim   80 mG/sulfamethoxazole 400 mG 1 Tablet(s) Oral daily  valGANciclovir 450 milliGRAM(s) Oral daily  valGANciclovir 450 milliGRAM(s) Oral <User Schedule>    MEDICATIONS  (PRN):  acetaminophen   Tablet .. 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  Carpal tunnel syndrome  Brain cyst: had MRI recently- now gone  Anemia  Gastric ulcer: Long time ago  Rheumatoid arthritis  Breakdown (mechanical) of penile (implanted) prosthesis, sequela  End-stage renal disease (ESRD): PERMACATH PLACEMENT  Abscess of left buttock: s/p I &amp; D  AV fistula: placement left lower arm  S/P cystoscopy: stent placement &amp; removal for kidney stones, lithiotripsy  s/p Lt Carpal tunnel: 2011      Vital Signs Last 24 Hrs  T(C): 36.6 (10 Dec 2019 09:00), Max: 36.8 (10 Dec 2019 07:00)  T(F): 97.9 (10 Dec 2019 09:00), Max: 98.3 (10 Dec 2019 07:00)  HR: 70 (10 Dec 2019 09:00) (69 - 77)  BP: 179/101 (10 Dec 2019 09:00) (138/76 - 179/101)  BP(mean): 117 (10 Dec 2019 01:00) (100 - 122)  RR: 18 (10 Dec 2019 09:00) (18 - 18)  SpO2: 98% (10 Dec 2019 09:00) (98% - 99%)    I&O's Summary    09 Dec 2019 07:01  -  10 Dec 2019 07:00  --------------------------------------------------------  IN: 2690 mL / OUT: 79 mL / NET: 2611 mL    10 Dec 2019 07:01  -  10 Dec 2019 11:37  --------------------------------------------------------  IN: 320 mL / OUT: 3 mL / NET: 317 mL                              7.8    14.47 )-----------( 93       ( 10 Dec 2019 06:45 )             22.0     12-10    130<L>  |  87<L>  |  34<H>  ----------------------------<  165<H>  4.2   |  20<L>  |  7.21<H>    Ca    8.1<L>      10 Dec 2019 06:37  Phos  6.1     12-10  Mg     1.7     12-10    TPro  6.1  /  Alb  3.1<L>  /  TBili  1.0  /  DBili  x   /  AST  31  /  ALT  18  /  AlkPhos  101  12-08    Tacrolimus (), Serum: 2.4 ng/mL (12-10 @ 07:18)                REVIEW OF SYSTEMS  --------------------------------------------------------------------------------  Gen: No weight changes, fatigue, fevers/chills, weakness  Skin: No rashes  Head/Eyes/Ears/Mouth: No headache; Normal hearing; Normal vision w/o blurriness; No sinus pain/discomfort, sore throat  Respiratory: No dyspnea, cough, wheezing, hemoptysis  CV: No chest pain, PND, orthopnea  GI: No abdominal pain, diarrhea, constipation, nausea, vomiting, melena, hematochezia  : No increased frequency, dysuria, hematuria, nocturia  MSK: No joint pain/swelling; no back pain; no edema  Neuro: No dizziness/lightheadedness, weakness, seizures, numbness, tingling  Heme: No easy bruising or bleeding  Endo: No heat/cold intolerance  Psych: No significant nervousness, anxiety, stress, depression  All other systems were reviewed and are negative, except as noted.      PHYSICAL EXAM:  Constitutional: Well developed / well nourished  Eyes: Anicteric, PERRLA  ENMT: nc/at  Neck: Supple, RT IJ TLC C/D/I no sign of erythema or infection noted  Respiratory: CTA B/L  Cardiovascular: RRR  Gastrointestinal: Soft abdomen, NT, ND  Genitourinary:  Martinez in place  Extremities: SCD's in place and working bilaterally  Vascular: Palpable dp pulses bilaterally  Neurological: A&O x3  Skin: Wound dressing changed with dry BO jackson-STEPHANI  Musculoskeletal: Moving all extremities  Psychiatric: Responsive

## 2019-12-11 NOTE — PHARMACY EDUCATION NOTE - EDUCATION SUMMARY
Discharge immunosuppressant medications and prophylatic anti-infective agents reviewed with the patient. Outpatient medication schedule was discussed in detail including: medication name, indication, dose, administration times, treatment duration, side effects, drug interactions, and special instructions.     Patient questions and concerns were answered and addressed. Patient demonstrated understanding.

## 2019-12-11 NOTE — PROGRESS NOTE ADULT - PROBLEM SELECTOR PLAN 1
Patient s/p DDRT on 12/8/2019. Remains oliguric but electrolytes stable. Went for HD yesterday. Patient on Envarsus 8 mg PO, Cellcept 1 gm BID, and steroid taper. Patient on antibiotic prophylaxis with Valcyte, Bactrim, and Nystatin. Check Prograf level prior to next AM dose.

## 2019-12-11 NOTE — PHARMACY EDUCATION NOTE - MEDICATION SAFETY
Adherence/Signs and symptoms to report/Allergies/Herbals/Interactions/Miss dose instruction/Purpose/Side effects/Medication precautions/Storage and handling

## 2019-12-11 NOTE — PROGRESS NOTE ADULT - SUBJECTIVE AND OBJECTIVE BOX
[facility] DIVISION OF KIDNEY DISEASES AND HYPERTENSION -- FOLLOW UP NOTE  --------------------------------------------------------------------------------  Chief Complaint: s/p DDRT    24 hour events/subjective: Patient evaluated at bedside, in no acute distress. Patient arranged for HD yesterday with 2L UF. Urine output remains oliguric.    PAST HISTORY  --------------------------------------------------------------------------------  No significant changes to PMH, PSH, FHx, SHx, unless otherwise noted    ALLERGIES & MEDICATIONS  --------------------------------------------------------------------------------  Allergies    coconut (Anaphylaxis)  morphine (Pruritus; Rash)    Intolerances    Standing Inpatient Medications  brimonidine 0.2% Ophthalmic Solution 1 Drop(s) Left EYE three times a day  chlorhexidine 4% Liquid 1 Application(s) Topical <User Schedule>  dextrose 5%. 1000 milliLiter(s) IV Continuous <Continuous>  dextrose 50% Injectable 12.5 Gram(s) IV Push once  dextrose 50% Injectable 25 Gram(s) IV Push once  dextrose 50% Injectable 25 Gram(s) IV Push once  dorzolamide 2% Ophthalmic Solution 1 Drop(s) Left EYE three times a day  famotidine    Tablet 20 milliGRAM(s) Oral daily  hydrALAZINE 50 milliGRAM(s) Oral two times a day  insulin lispro (HumaLOG) corrective regimen sliding scale   SubCutaneous three times a day before meals  latanoprost 0.005% Ophthalmic Solution 1 Drop(s) Left EYE at bedtime  methylPREDNISolone sodium succinate Injectable 30 milliGRAM(s) IV Push two times a day  metoprolol tartrate 25 milliGRAM(s) Oral two times a day  mycophenolate mofetil 1 Gram(s) Oral every 12 hours  nystatin    Suspension [id number] Unit(s) Swish and Swallow four times a day  sodium chloride 0.9% lock flush 3 milliLiter(s) IV Push every 8 hours  tacrolimus ER Tablet (ENVARSUS XR) 8 milliGRAM(s) Oral daily  trimethoprim   80 mG/sulfamethoxazole 400 mG 1 Tablet(s) Oral daily  valGANciclovir 450 milliGRAM(s) Oral <User Schedule>    REVIEW OF SYSTEMS  --------------------------------------------------------------------------------  Gen: No fatigue, fevers/chills, weakness  Skin: No rashes  Head/Eyes/Ears/Mouth: No headache;No sore throat  Respiratory: No dyspnea, cough,   CV: No chest pain, PND, orthopnea  GI: No abdominal pain, diarrhea, constipation, nausea, vomiting  Transplant: No pain  : No increased frequency, dysuria, hematuria, nocturia  MSK: No joint pain/swelling; no back pain; no edema  Neuro: No dizziness/lightheadedness, weakness, seizures, numbness, tingling  Psych: No significant nervousness, anxiety, stress, depression    All other systems were reviewed and are negative, except as noted.    VITALS/PHYSICAL EXAM  --------------------------------------------------------------------------------  T(C): 36.6 (12-11-19 @ 09:00), Max: 36.9 (12-10-19 @ 18:15)  HR: 72 (12-11-19 @ 09:00) (72 - 81)  BP: 149/88 (12-11-19 @ 09:00) (149/88 - 169/93)  RR: 18 (12-11-19 @ 09:00) (18 - 20)  SpO2: 97% (12-11-19 @ 09:00) (96% - 100%)  Wt(kg): --    12-10-19 @ 07:01  -  12-11-19 @ 07:00  --------------------------------------------------------  IN: 810 mL / OUT: 2092 mL / NET: -1282 mL    12-11-19 @ 07:01  -  12-11-19 @ 11:41  --------------------------------------------------------  IN: 200 mL / OUT: 15 mL / NET: 185 mL    Physical Exam:  	Gen: NAD, well-appearing  	HEENT: PERRL, supple neck, clear oropharynx  	Pulm: CTA B/L  	CV: RRR, S1S2; no rub  	Back: No spinal or CVA tenderness; no sacral edema  	Abd: +BS, soft, nontender/nondistended              Transplant: No tenderness, swelling  	: No suprapubic tenderness  	UE: Warm, FROM, intact strength; no edema; no asterixis  	LE: Warm, FROM, intact strength; no edema  	Neuro: No focal deficits, intact gait  	Psych: Normal affect and mood  	Skin: Warm, without rashes    LABS/STUDIES  --------------------------------------------------------------------------------              8.2    14.98 >-----------<  72       [12-11-19 @ 06:27]              23.4     134  |  90  |  24  ----------------------------<  120      [12-11-19 @ 06:24]  3.7   |  24  |  4.92        Ca     8.5     [12-11-19 @ 06:24]      Mg     1.9     [12-11-19 @ 06:24]      Phos  4.8     [12-11-19 @ 06:24]          [12-11-19 @ 06:24]    Creatinine Trend:  SCr 4.92 [12-11 @ 06:24]  SCr 7.29 [12-10 @ 11:48]  SCr 7.21 [12-10 @ 06:37]  SCr 5.79 [12-09 @ 00:45]  SCr 5.73 [12-08 @ 19:58]    Tacrolimus (), Serum: 3.7 ng/mL (12-11 @ 07:10)  Tacrolimus (), Serum: 2.4 ng/mL (12-10 @ 07:18)  Tacrolimus (), Serum: <2.0 ng/mL (12-09 @ 07:28)

## 2019-12-11 NOTE — CONSULT NOTE ADULT - SUBJECTIVE AND OBJECTIVE BOX
NEPHROLOGY CONSULTATION    CHIEF COMPLAINT:     HPI:  Pt is 48 yo M with PMH of ESRD on HD (since 2013 with Dr. Chang TTS via LUE AVF), Glomerulonephritis, HTN, Gout, Glaucoma, NET of pancreas (s/p robotic distal panc/splenectomy at [place] 2015 by Dr. Leigh, followed by Dr. Raphael, [facility] Oncologist), RA with hand contractures s/p DDRT on 12/8/2019. Cardiac: cleared by Jennifer/Hamzah 2018. Onc: cleared by Donavon 2019 (low grade NET, risk of HD greater than recurrence of low grade NET, recent PET/MRI neg). Oliguric. Will follow for continued need for RRT at this time per d/w transplant team.    ROS:  as above    Allergies:  coconut (Anaphylaxis)  morphine (Pruritus; Rash)    PAST MEDICAL & SURGICAL HISTORY:  Erectile dysfunction  Glaucoma  Gout  HTN (hypertension)  ESRD (end stage renal disease) on dialysis: since 7/2013 jarrett murry, sat  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  Carpal tunnel syndrome  Brain cyst: had MRI recently- now gone  Anemia  Gastric ulcer: Long time ago  Rheumatoid arthritis  Breakdown (mechanical) of penile (implanted) prosthesis, sequela  End-stage renal disease (ESRD): PERMACATH PLACEMENT  Abscess of left buttock: s/p I &amp; D  AV fistula: placement left lower arm  S/P cystoscopy: stent placement &amp; removal for kidney stones, lithiotripsy  s/p Lt Carpal tunnel: 2011    SOCIAL HISTORY:  negative    FAMILY HISTORY:  Family history of myocardial infarction in first degree male relative  Family history of rheumatoid arthritis  Family history of hypertension in mother  Family history of cerebral aneurysm (Sibling)    MEDICATIONS  (STANDING):  brimonidine 0.2% Ophthalmic Solution 1 Drop(s) Left EYE three times a day  chlorhexidine 4% Liquid 1 Application(s) Topical <User Schedule>  dextrose 5%. 1000 milliLiter(s) (50 mL/Hr) IV Continuous <Continuous>  dextrose 50% Injectable 12.5 Gram(s) IV Push once  dextrose 50% Injectable 25 Gram(s) IV Push once  dextrose 50% Injectable 25 Gram(s) IV Push once  dorzolamide 2% Ophthalmic Solution 1 Drop(s) Left EYE three times a day  famotidine    Tablet 20 milliGRAM(s) Oral daily  hydrALAZINE 50 milliGRAM(s) Oral two times a day  insulin lispro (HumaLOG) corrective regimen sliding scale   SubCutaneous three times a day before meals  latanoprost 0.005% Ophthalmic Solution 1 Drop(s) Left EYE at bedtime  methylPREDNISolone sodium succinate Injectable 30 milliGRAM(s) IV Push two times a day  metoprolol tartrate 25 milliGRAM(s) Oral two times a day  mycophenolate mofetil 1 Gram(s) Oral every 12 hours  nystatin    Suspension [id number] Unit(s) Swish and Swallow four times a day  sodium chloride 0.9% lock flush 3 milliLiter(s) IV Push every 8 hours  tacrolimus ER Tablet (ENVARSUS XR) 8 milliGRAM(s) Oral daily  trimethoprim   80 mG/sulfamethoxazole 400 mG 1 Tablet(s) Oral daily  valGANciclovir 450 milliGRAM(s) Oral <User Schedule>    Vital Signs Last 24 Hrs  T(C): 36.6 (12-11-19 @ 09:00), Max: 36.9 (12-10-19 @ 18:15)  T(F): 97.9 (12-11-19 @ 09:00), Max: 98.5 (12-10-19 @ 18:15)  HR: 72 (12-11-19 @ 09:00) (72 - 81)  BP: 149/88 (12-11-19 @ 09:00) (149/88 - 169/93)  RR: 18 (12-11-19 @ 09:00) (18 - 20)  SpO2: 97% (12-11-19 @ 09:00) (96% - 100%)    LABS:                        8.2    14.98 )-----------( 72       ( 11 Dec 2019 06:27 )             23.4     12-11    134<L>  |  90<L>  |  24<H>  ----------------------------<  120<H>  3.7   |  24  |  4.92<H>    Ca    8.5      11 Dec 2019 06:24  Phos  4.8     12-11  Mg     1.9     12-11    A/P: NEPHROLOGY CONSULTATION    CHIEF COMPLAINT:     HPI:  Pt is 48 yo M with PMH of ESRD on HD (since 2013 with Dr. Chang TTS via LUE AVF), Glomerulonephritis, HTN, Gout, Glaucoma, NET of pancreas (s/p robotic distal panc/splenectomy at [place] 2015 by Dr. Leigh, followed by Dr. Raphael, [facility] Oncologist), RA with hand contractures s/p DDRT on 12/8/2019. Cardiac: cleared by Jennifer/Hamzah 2018. Onc: cleared by Donavon 2019 (low grade NET, risk of HD greater than recurrence of low grade NET, recent PET/MRI neg). Oliguric. Will follow for continued need for RRT at this time per d/w transplant team. Awake, alert, denies CP, SOB, N/V/F/C.    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  Carpal tunnel syndrome  Brain cyst: had MRI recently- now gone  Anemia  Gastric ulcer: Long time ago  Rheumatoid arthritis  Breakdown (mechanical) of penile (implanted) prosthesis, sequela  End-stage renal disease (ESRD): PERMACATH PLACEMENT  Abscess of left buttock: s/p I &amp; D  AV fistula: placement left lower arm  S/P cystoscopy: stent placement &amp; removal for kidney stones, lithiotripsy  s/p Lt Carpal tunnel: 2011    SOCIAL HISTORY:  negative    FAMILY HISTORY:  Family history of myocardial infarction in first degree male relative  Family history of rheumatoid arthritis  Family history of hypertension in mother  Family history of cerebral aneurysm (Sibling)    MEDICATIONS  (STANDING):  brimonidine 0.2% Ophthalmic Solution 1 Drop(s) Left EYE three times a day  chlorhexidine 4% Liquid 1 Application(s) Topical <User Schedule>  dextrose 5%. 1000 milliLiter(s) (50 mL/Hr) IV Continuous <Continuous>  dextrose 50% Injectable 12.5 Gram(s) IV Push once  dextrose 50% Injectable 25 Gram(s) IV Push once  dextrose 50% Injectable 25 Gram(s) IV Push once  dorzolamide 2% Ophthalmic Solution 1 Drop(s) Left EYE three times a day  famotidine    Tablet 20 milliGRAM(s) Oral daily  hydrALAZINE 50 milliGRAM(s) Oral two times a day  insulin lispro (HumaLOG) corrective regimen sliding scale   SubCutaneous three times a day before meals  latanoprost 0.005% Ophthalmic Solution 1 Drop(s) Left EYE at bedtime  methylPREDNISolone sodium succinate Injectable 30 milliGRAM(s) IV Push two times a day  metoprolol tartrate 25 milliGRAM(s) Oral two times a day  mycophenolate mofetil 1 Gram(s) Oral every 12 hours  nystatin    Suspension 540407 Unit(s) Swish and Swallow four times a day  sodium chloride 0.9% lock flush 3 milliLiter(s) IV Push every 8 hours  tacrolimus ER Tablet (ENVARSUS XR) 8 milliGRAM(s) Oral daily  trimethoprim   80 mG/sulfamethoxazole 400 mG 1 Tablet(s) Oral daily  valGANciclovir 450 milliGRAM(s) Oral <User Schedule>    Vital Signs Last 24 Hrs  T(C): 36.6 (12-11-19 @ 09:00), Max: 36.9 (12-10-19 @ 18:15)  T(F): 97.9 (12-11-19 @ 09:00), Max: 98.5 (12-10-19 @ 18:15)  HR: 72 (12-11-19 @ 09:00) (72 - 81)  BP: 149/88 (12-11-19 @ 09:00) (149/88 - 169/93)  RR: 18 (12-11-19 @ 09:00) (18 - 20)  SpO2: 97% (12-11-19 @ 09:00) (96% - 100%)    Card S1S2  Lungs b/l air entry  Abd soft  Extr no edema, AVF patent    LABS:                        8.2    14.98 )-----------( 72       ( 11 Dec 2019 06:27 )             23.4     12-11    134<L>  |  90<L>  |  24<H>  ----------------------------<  120<H>  3.7   |  24  |  4.92<H>    Ca    8.5      11 Dec 2019 06:24  Phos  4.8     12-11  Mg     1.9     12-11    A/P:    ESRD on HD  S/p DDRT on 12/8/2019  DGF  Immunosuppresion/further transplant management per transplant team  As pt is oligo-anuric, will continue HD 3 x week  Next HD tomorrow  Epogen w/HD  Will follow

## 2019-12-11 NOTE — PROGRESS NOTE ADULT - ASSESSMENT
48 year old male with PMH ESRD on HD T/Th/Sat via L arm fistula since Aug 2013 (at [facility],) s/p DDRT on 12/8/2019.    Recipient info:   CPRA:    o%  ABO:      A+  CMVAb:  Positive  EBVIgG Status:  Positive  Last HD:  12/7/2019    Donor ID:  [id number]  Match: [id number]  OPO: NYRT  Age:  49  ABO:  A  KDPI 81%  COD:  Anoxia  X Clamp Time:  12/7/2019 1538.  Medical Hx: DM, HTN, HLD, obesity BMI 53, CAD, CABAGX3  Terminal Cr:  1/1.8/1.7  CMV- Neg EBV- Neg

## 2019-12-11 NOTE — PROGRESS NOTE ADULT - ASSESSMENT
49M with significant PMHx of ESRD on HD  (no bx, since 2013 with  TTS via LUE AVF, anuric), HTN, Gout, Glaucoma, NET of pancreas (s/p robotic distal panc/splenectomy at [place] 2015 by Dr. Young, followed by Dr. Raphael, [facility] Oncologist), RA with hand contractures, now s/p DDRT on 12/8/19 POD #3.    Kidney replaced by transplant:   - POD # 3 DGF  - On PRN Tylenol and tramadol for pain, Monitor and manage pain  - Oliguric made 62 ml overnight, keep Kennedy in place  - D/C IVF  - [place] Po diet  - strict I/Os  - Pt encouraged to ambulate  - Plan to d/c TLC today  PIV placed  - cbc/ bmp/ mg/ phos daily     Immunosuppressive management encounter following kidney transplant:    Continue Tacrolimus, cellcept, steroid taper, Simulect induction next dose on day 4  PPX: Nystatin S&S, bactrim, and change Valcyte to 2xweek   F/U Tacrolimus level daily, tacro goal 8-10  Monitor closely.     HTN (Hypertension).  Plan:  controlled   on metoprolol and hydralazine   Monitor bp.

## 2019-12-12 LAB
ANION GAP SERPL CALC-SCNC: 22 MMOL/L — HIGH (ref 5–17)
APTT BLD: 27 SEC — LOW (ref 27.5–36.3)
BLD GP AB SCN SERPL QL: NEGATIVE — SIGNIFICANT CHANGE UP
BUN SERPL-MCNC: 41 MG/DL — HIGH (ref 7–23)
CALCIUM SERPL-MCNC: 8.5 MG/DL — SIGNIFICANT CHANGE UP (ref 8.4–10.5)
CHLORIDE SERPL-SCNC: 88 MMOL/L — LOW (ref 96–108)
CO2 SERPL-SCNC: 21 MMOL/L — LOW (ref 22–31)
CREAT SERPL-MCNC: 6.38 MG/DL — HIGH (ref 0.5–1.3)
GLUCOSE BLDC GLUCOMTR-MCNC: 103 MG/DL — HIGH (ref 70–99)
GLUCOSE BLDC GLUCOMTR-MCNC: 120 MG/DL — HIGH (ref 70–99)
GLUCOSE BLDC GLUCOMTR-MCNC: 126 MG/DL — HIGH (ref 70–99)
GLUCOSE BLDC GLUCOMTR-MCNC: 148 MG/DL — HIGH (ref 70–99)
GLUCOSE SERPL-MCNC: 111 MG/DL — HIGH (ref 70–99)
HAPTOGLOB SERPL-MCNC: <20 MG/DL — LOW (ref 34–200)
HCT VFR BLD CALC: 23.6 % — LOW (ref 39–50)
HCT VFR BLD CALC: 24.3 % — LOW (ref 39–50)
HGB BLD-MCNC: 8.1 G/DL — LOW (ref 13–17)
HGB BLD-MCNC: 8.3 G/DL — LOW (ref 13–17)
INR BLD: 0.99 RATIO — SIGNIFICANT CHANGE UP (ref 0.88–1.16)
LDH SERPL L TO P-CCNC: 589 U/L — HIGH (ref 50–242)
MAGNESIUM SERPL-MCNC: 2 MG/DL — SIGNIFICANT CHANGE UP (ref 1.6–2.6)
MCHC RBC-ENTMCNC: 33.5 PG — SIGNIFICANT CHANGE UP (ref 27–34)
MCHC RBC-ENTMCNC: 33.6 PG — SIGNIFICANT CHANGE UP (ref 27–34)
MCHC RBC-ENTMCNC: 34.2 GM/DL — SIGNIFICANT CHANGE UP (ref 32–36)
MCHC RBC-ENTMCNC: 34.3 GM/DL — SIGNIFICANT CHANGE UP (ref 32–36)
MCV RBC AUTO: 97.9 FL — SIGNIFICANT CHANGE UP (ref 80–100)
MCV RBC AUTO: 98 FL — SIGNIFICANT CHANGE UP (ref 80–100)
NRBC # BLD: 3 /100 WBCS — HIGH (ref 0–0)
NRBC # BLD: 4 /100 WBCS — HIGH (ref 0–0)
PHOSPHATE SERPL-MCNC: 5.8 MG/DL — HIGH (ref 2.5–4.5)
PLATELET # BLD AUTO: 45 K/UL — LOW (ref 150–400)
PLATELET # BLD AUTO: 47 K/UL — LOW (ref 150–400)
POTASSIUM SERPL-MCNC: 4.8 MMOL/L — SIGNIFICANT CHANGE UP (ref 3.5–5.3)
POTASSIUM SERPL-SCNC: 4.8 MMOL/L — SIGNIFICANT CHANGE UP (ref 3.5–5.3)
PROTHROM AB SERPL-ACNC: 11.4 SEC — SIGNIFICANT CHANGE UP (ref 10–12.9)
RBC # BLD: 2.41 M/UL — LOW (ref 4.2–5.8)
RBC # BLD: 2.48 M/UL — LOW (ref 4.2–5.8)
RBC # FLD: 18.6 % — HIGH (ref 10.3–14.5)
RBC # FLD: 18.6 % — HIGH (ref 10.3–14.5)
RH IG SCN BLD-IMP: POSITIVE — SIGNIFICANT CHANGE UP
SODIUM SERPL-SCNC: 131 MMOL/L — LOW (ref 135–145)
TACROLIMUS SERPL-MCNC: 4.5 NG/ML — SIGNIFICANT CHANGE UP
WBC # BLD: 13.97 K/UL — HIGH (ref 3.8–10.5)
WBC # BLD: 15.78 K/UL — HIGH (ref 3.8–10.5)
WBC # FLD AUTO: 13.97 K/UL — HIGH (ref 3.8–10.5)
WBC # FLD AUTO: 15.78 K/UL — HIGH (ref 3.8–10.5)

## 2019-12-12 PROCEDURE — 36514 APHERESIS PLASMA: CPT

## 2019-12-12 PROCEDURE — 99232 SBSQ HOSP IP/OBS MODERATE 35: CPT | Mod: GC

## 2019-12-12 PROCEDURE — 99222 1ST HOSP IP/OBS MODERATE 55: CPT | Mod: 25

## 2019-12-12 RX ORDER — DESMOPRESSIN ACETATE 0.1 MG/1
1000 TABLET ORAL ONCE
Refills: 0 | Status: DISCONTINUED | OUTPATIENT
Start: 2019-12-12 | End: 2019-12-12

## 2019-12-12 RX ORDER — GABAPENTIN 400 MG/1
100 CAPSULE ORAL
Refills: 0 | Status: DISCONTINUED | OUTPATIENT
Start: 2019-12-12 | End: 2019-12-20

## 2019-12-12 RX ORDER — TACROLIMUS 5 MG/1
8 CAPSULE ORAL DAILY
Refills: 0 | Status: DISCONTINUED | OUTPATIENT
Start: 2019-12-12 | End: 2019-12-12

## 2019-12-12 RX ORDER — DESMOPRESSIN ACETATE 0.1 MG/1
19 TABLET ORAL ONCE
Refills: 0 | Status: COMPLETED | OUTPATIENT
Start: 2019-12-12 | End: 2019-12-13

## 2019-12-12 RX ORDER — ACETAMINOPHEN 500 MG
650 TABLET ORAL ONCE
Refills: 0 | Status: COMPLETED | OUTPATIENT
Start: 2019-12-12 | End: 2019-12-12

## 2019-12-12 RX ORDER — ALLOPURINOL 300 MG
100 TABLET ORAL DAILY
Refills: 0 | Status: DISCONTINUED | OUTPATIENT
Start: 2019-12-12 | End: 2019-12-20

## 2019-12-12 RX ORDER — TACROLIMUS 5 MG/1
2 CAPSULE ORAL ONCE
Refills: 0 | Status: COMPLETED | OUTPATIENT
Start: 2019-12-12 | End: 2019-12-12

## 2019-12-12 RX ORDER — DIPHENHYDRAMINE HCL 50 MG
25 CAPSULE ORAL ONCE
Refills: 0 | Status: COMPLETED | OUTPATIENT
Start: 2019-12-12 | End: 2019-12-12

## 2019-12-12 RX ADMIN — Medication 500000 UNIT(S): at 05:34

## 2019-12-12 RX ADMIN — Medication 500000 UNIT(S): at 23:56

## 2019-12-12 RX ADMIN — TRAMADOL HYDROCHLORIDE 50 MILLIGRAM(S): 50 TABLET ORAL at 12:28

## 2019-12-12 RX ADMIN — CHLORHEXIDINE GLUCONATE 1 APPLICATION(S): 213 SOLUTION TOPICAL at 09:31

## 2019-12-12 RX ADMIN — MYCOPHENOLATE MOFETIL 1 GRAM(S): 250 CAPSULE ORAL at 05:33

## 2019-12-12 RX ADMIN — FAMOTIDINE 20 MILLIGRAM(S): 10 INJECTION INTRAVENOUS at 11:44

## 2019-12-12 RX ADMIN — MYCOPHENOLATE MOFETIL 1 GRAM(S): 250 CAPSULE ORAL at 17:09

## 2019-12-12 RX ADMIN — TRAMADOL HYDROCHLORIDE 50 MILLIGRAM(S): 50 TABLET ORAL at 20:10

## 2019-12-12 RX ADMIN — Medication 20 MILLIGRAM(S): at 21:22

## 2019-12-12 RX ADMIN — BASILIXIMAB 100 MILLIGRAM(S): 20 INJECTION, POWDER, FOR SOLUTION INTRAVENOUS at 09:31

## 2019-12-12 RX ADMIN — TRAMADOL HYDROCHLORIDE 50 MILLIGRAM(S): 50 TABLET ORAL at 12:58

## 2019-12-12 RX ADMIN — Medication 500000 UNIT(S): at 17:10

## 2019-12-12 RX ADMIN — Medication 500000 UNIT(S): at 11:44

## 2019-12-12 RX ADMIN — Medication 50 MILLIGRAM(S): at 17:10

## 2019-12-12 RX ADMIN — SODIUM CHLORIDE 3 MILLILITER(S): 9 INJECTION INTRAMUSCULAR; INTRAVENOUS; SUBCUTANEOUS at 11:45

## 2019-12-12 RX ADMIN — DORZOLAMIDE HYDROCHLORIDE 1 DROP(S): 20 SOLUTION/ DROPS OPHTHALMIC at 17:08

## 2019-12-12 RX ADMIN — Medication 1 TABLET(S): at 11:44

## 2019-12-12 RX ADMIN — TRAMADOL HYDROCHLORIDE 25 MILLIGRAM(S): 50 TABLET ORAL at 23:56

## 2019-12-12 RX ADMIN — GABAPENTIN 100 MILLIGRAM(S): 400 CAPSULE ORAL at 17:16

## 2019-12-12 RX ADMIN — Medication 20 MILLIGRAM(S): at 05:33

## 2019-12-12 RX ADMIN — DORZOLAMIDE HYDROCHLORIDE 1 DROP(S): 20 SOLUTION/ DROPS OPHTHALMIC at 05:34

## 2019-12-12 RX ADMIN — SODIUM CHLORIDE 3 MILLILITER(S): 9 INJECTION INTRAMUSCULAR; INTRAVENOUS; SUBCUTANEOUS at 06:15

## 2019-12-12 RX ADMIN — TACROLIMUS 2 MILLIGRAM(S): 5 CAPSULE ORAL at 10:35

## 2019-12-12 RX ADMIN — Medication 25 MILLIGRAM(S): at 17:09

## 2019-12-12 RX ADMIN — Medication 500000 UNIT(S): at 00:31

## 2019-12-12 RX ADMIN — VALGANCICLOVIR 450 MILLIGRAM(S): 450 TABLET, FILM COATED ORAL at 05:33

## 2019-12-12 RX ADMIN — SODIUM CHLORIDE 3 MILLILITER(S): 9 INJECTION INTRAMUSCULAR; INTRAVENOUS; SUBCUTANEOUS at 22:20

## 2019-12-12 RX ADMIN — Medication 20 MILLIGRAM(S): at 17:09

## 2019-12-12 RX ADMIN — TRAMADOL HYDROCHLORIDE 50 MILLIGRAM(S): 50 TABLET ORAL at 21:00

## 2019-12-12 RX ADMIN — Medication 650 MILLIGRAM(S): at 20:00

## 2019-12-12 RX ADMIN — Medication 25 MILLIGRAM(S): at 19:59

## 2019-12-12 RX ADMIN — Medication 100 MILLIGRAM(S): at 11:51

## 2019-12-12 RX ADMIN — Medication 25 MILLIGRAM(S): at 21:13

## 2019-12-12 RX ADMIN — BRIMONIDINE TARTRATE 1 DROP(S): 2 SOLUTION/ DROPS OPHTHALMIC at 05:34

## 2019-12-12 RX ADMIN — TACROLIMUS 8 MILLIGRAM(S): 5 CAPSULE ORAL at 07:45

## 2019-12-12 RX ADMIN — Medication 50 MILLIGRAM(S): at 05:33

## 2019-12-12 RX ADMIN — BRIMONIDINE TARTRATE 1 DROP(S): 2 SOLUTION/ DROPS OPHTHALMIC at 17:08

## 2019-12-12 RX ADMIN — Medication 25 MILLIGRAM(S): at 05:33

## 2019-12-12 NOTE — PROGRESS NOTE ADULT - PROBLEM SELECTOR PLAN 1
Patient s/p DDRT on 12/8/2019. Remains oliguric but electrolytes stable. Planned for HD today. Patient's Envarsus increased to 10 mg PO, Cellcept 1 gm BID, and steroid taper. Patient on antibiotic prophylaxis with Valcyte, Bactrim, and Nystatin. Concern for possible TMA due to elevated LDH and low platelets. Check peripheral smear for schistocytes. Will arrange for biopsy, possibly Friday. Please give DDAVP and platelet transfusion prior to biopsy.

## 2019-12-12 NOTE — PROGRESS NOTE ADULT - ASSESSMENT
49M with significant PMHx of ESRD on HD  (no bx, since 2013 with  TTS via LUE AVF, anuric), HTN, Gout, Glaucoma, NET of pancreas (s/p robotic distal panc/splenectomy at [place] 2015 by Dr. Young, followed by Dr. Raphael, [facility] Oncologist), RA with hand contractures  s/p DDRT on 12/8/19 POD #4. Now with downtrending platelets and uptrending LDH.     Kidney replaced by transplant:   - POD # 4 DGF  - On PRN Tylenol and tramadol for pain, Monitor and manage pain  - Oliguric made 62 ml overnight, keep Kennedy in place  - Renal biopsy today in setting of low plts and uptrending LDH concern for TMA  - Hem consulted to evaluate periph smear for shistocytes  - haptoglobin ordered f/u results   - NPO for biopsy   - strict I/Os  DSA sent f/u results   - 1 unit plts and DDAVP 19 mg IV x 1 prior to IR   - Pt encouraged to ambulate  - cbc/ bmp/ mg/ phos daily       Immunosuppressive management encounter following kidney transplant:    Continue Tacrolimus, cellcept, steroid taper, Simulect induction next dose on day 4  PPX: Nystatin S&S, bactrim, and change Valcyte to 2xweek   F/U Tacrolimus level daily, tacro goal 8-10  Monitor closely.     HTN (Hypertension).  Plan:  controlled   on metoprolol and hydralazine   Monitor bp.

## 2019-12-12 NOTE — CONSULT NOTE ADULT - ATTENDING COMMENTS
Agree with above. Pt evaluated at bedside.   49 M w/ ESRD s/p recent transplant, now with evidence of acute rejection. Noted to have worsening thrombocytopenia and anemia, elevated LDH, low hapto and multiple schistocytes on smear. 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   [id number] sent, recommend PLEX until [id number] results are back, stop Tacro  Spoke to blood bank director. PLab for PLEX tonight.   Kidney bx scheduled for tomorrow  WIll follow
I have seen this patient with the fellow and agree with their assessment and plan. 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 Last hd yesterday  No urgent ind for HD right now, consent obtained for later incase needs post op- please let us know  Standard simulect protocol planned post op  BP monitoring and might require strongly control post op  WOULD check spot urine p/crt ratio post op immediately   Pt has had cardiac stress test last year- clear  Recent dx of Neuro end pancreatic tumor removed last year, no recurrence, onc cleared few months ago.    Jason Lau MD  Cell   Pager   Office

## 2019-12-12 NOTE — PROVIDER CONTACT NOTE (OTHER) - ASSESSMENT
Pt states no chest pain or difficulty breathing. Pt denies s/s of PAT. See flow sheet for vital signs.

## 2019-12-12 NOTE — CONSULT NOTE ADULT - SUBJECTIVE AND OBJECTIVE BOX
Hematology/Oncology Consult Note    HPI:  49 year old male with ESRD on HD  a/p kidney transplant on 12/8, HTN, Gout, Glaucoma, NET of pancreas (s/p robotic distal panc/splenectomy at [place] 2015 by Dr. Leigh, followed by Dr. Raphael, [facility] Oncologist), RA with hand contractures, presents for potential DDRT was admitted for elective kidney transplant. Hematology was consulted for elevated LHD and new thrombocytopenia.      Full hx:  ESRD (end stage renal disease) on dialysis: since 7/2013 jarrett murry, sat  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  Carpal tunnel syndrome  Brain cyst: had MRI recently- now gone  Anemia  Gastric ulcer: Long time ago  Rheumatoid arthritis  Breakdown (mechanical) of penile (implanted) prosthesis, sequela  End-stage renal disease (ESRD): PERMACATH PLACEMENT  Abscess of left buttock: s/p I &amp; D  AV fistula: placement left lower arm  S/P cystoscopy: stent placement &amp; removal for kidney stones, lithiotripsy  s/p Lt Carpal tunnel: 2011 (08 Dec 2019 11:12)      PAST MEDICAL & SURGICAL HISTORY:  Erectile dysfunction  Glaucoma  Gout  HTN (hypertension)  ESRD (end stage renal disease) on dialysis: since 7/2013 jarrett murry, sat  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  Carpal tunnel syndrome  Brain cyst: had MRI recently- now gone  Anemia  Gastric ulcer: Long time ago  Rheumatoid arthritis  Breakdown (mechanical) of penile (implanted) prosthesis, sequela  End-stage renal disease (ESRD): PERMACATH PLACEMENT  Abscess of left buttock: s/p I &amp; D  AV fistula: placement left lower arm  S/P cystoscopy: stent placement &amp; removal for kidney stones, lithiotripsy  s/p Lt Carpal tunnel: 2011      FAMILY HISTORY:  Family history of myocardial infarction in first degree male relative  Family history of rheumatoid arthritis  Family history of hypertension in mother  Family history of cerebral aneurysm (Sibling)      MEDICATIONS  (STANDING):  allopurinol 100 milliGRAM(s) Oral daily  brimonidine 0.2% Ophthalmic Solution 1 Drop(s) Left EYE three times a day  chlorhexidine 4% Liquid 1 Application(s) Topical <User Schedule>  desmopressin IVPB 19 MICROGram(s) IV Intermittent once  dextrose 5%. 1000 milliLiter(s) (50 mL/Hr) IV Continuous <Continuous>  dextrose 50% Injectable 12.5 Gram(s) IV Push once  dextrose 50% Injectable 25 Gram(s) IV Push once  dextrose 50% Injectable 25 Gram(s) IV Push once  dorzolamide 2% Ophthalmic Solution 1 Drop(s) Left EYE three times a day  famotidine    Tablet 20 milliGRAM(s) Oral daily  gabapentin 100 milliGRAM(s) Oral two times a day  hydrALAZINE 50 milliGRAM(s) Oral two times a day  insulin lispro (HumaLOG) corrective regimen sliding scale   SubCutaneous three times a day before meals  latanoprost 0.005% Ophthalmic Solution 1 Drop(s) Left EYE at bedtime  metoprolol tartrate 25 milliGRAM(s) Oral two times a day  mycophenolate mofetil 1 Gram(s) Oral every 12 hours  nystatin    Suspension 617856 Unit(s) Swish and Swallow four times a day  predniSONE   Tablet 20 milliGRAM(s) Oral two times a day  sodium chloride 0.9% lock flush 3 milliLiter(s) IV Push every 8 hours  trimethoprim   80 mG/sulfamethoxazole 400 mG 1 Tablet(s) Oral daily  valGANciclovir 450 milliGRAM(s) Oral <User Schedule>    MEDICATIONS  (PRN):  acetaminophen   Tablet .. 650 milliGRAM(s) Oral every 6 hours PRN Mild Pain (1 - 3)  dextrose 40% Gel 15 Gram(s) Oral once PRN Blood Glucose LESS THAN 70 milliGRAM(s)/deciliter  glucagon  Injectable 1 milliGRAM(s) IntraMuscular once PRN Glucose LESS THAN 70 milligrams/deciliter  lidocaine 2% Jelly 10 milliLiter(s) IntraUrethral three times a day PRN Ureteral irritation  ondansetron Injectable 4 milliGRAM(s) IV Push every 6 hours PRN Nausea and/or Vomiting  traMADol 25 milliGRAM(s) Oral every 4 hours PRN Moderate Pain (4 - 6)  traMADol 50 milliGRAM(s) Oral every 6 hours PRN Severe Pain (7 - 10)      Allergies    coconut (Anaphylaxis)  morphine (Pruritus; Rash)    VITAL SIGNS:    T(F): 97.7 (12-12-19 @ 13:10), Max: 98.2 (12-11-19 @ 17:00)  HR: 72 (12-12-19 @ 13:10)  BP: 161/99 (12-12-19 @ 13:10)  RR: 19 (12-12-19 @ 13:10)  SpO2: 98% (12-12-19 @ 13:10)  Wt(kg): --    PHYSICAL EXAMINATION:  Constitutional: NAD   Eyes: PERRLA, EOMI, sclera non-icteric, conjunctiva non-anemia  Neck: supple, no masses, no elevated JVP  Mouth: No mucositis, no exudate, no ulcer.   Respiratory: CTA b/l  Cardiovascular: Normal rate regular rhythm. normal S1S2, no murmur  Gastrointestinal: soft and flat, no tenderness to palpation, no masses palpable, normoactive bowel sound, no HSM  Extremities:  No c/c/e  MSK: No joint swelling, erythema, or tenderness   Neurological: AOx3, Cranial nerves II-XII grossly intact  Skin: No rash  Psych: Normal affect    LABS:                        8.3    15.78 )-----------( 45       ( 12 Dec 2019 11:17 )             24.3     12-12    131<L>  |  88<L>  |  41<H>  ----------------------------<  111<H>  4.8   |  21<L>  |  6.38<H>    Ca    8.5      12 Dec 2019 06:30  Phos  5.8     12-12  Mg     2.0     12-12      PT/INR - ( 12 Dec 2019 12:41 )   PT: 11.4 sec;   INR: 0.99 ratio         PTT - ( 12 Dec 2019 12:41 )  PTT:27.0 sec Haptoglobin, Serum: <20 mg/dL (12-12 @ 14:22)  Lactate Dehydrogenase, Serum: 589 U/L (12-12 @ 06:30)  Magnesium, Serum: 2.0 mg/dL (12-12 @ 06:30)  Phosphorus Level, Serum: 5.8 mg/dL (12-12 @ 06:30)

## 2019-12-12 NOTE — PROGRESS NOTE ADULT - ASSESSMENT
48 year old male with PMH ESRD on HD T/Th/Sat via L arm fistula since Aug 2013 (at [facility],) s/p DDRT on 12/8/2019 complicated by DGF and now concern for possible TMA.    Recipient info:   CPRA:    o%  ABO:      A+  CMVAb:  Positive  EBVIgG Status:  Positive  Last HD:  12/7/2019    Donor ID:  [id number]  Match: [id number]  OPO: NYRT  Age:  49  ABO:  A  KDPI 81%  COD:  Anoxia  X Clamp Time:  12/7/2019 1538.  Medical Hx: DM, HTN, HLD, obesity BMI 53, CAD, CABAGX3  Terminal Cr:  1/1.8/1.7  CMV- Neg EBV- Neg

## 2019-12-12 NOTE — PROGRESS NOTE ADULT - SUBJECTIVE AND OBJECTIVE BOX
Transplant Surgery - Multidisciplinary Rounds  --------------------------------------------------------------  DDRT POD #3    Present:  Patient seen with multidisciplinary team icluding Transplant Surgeon: Dr. Espinal,  Transplant Nephrologist: Dr. Lowery, Pharmacist: Debbi Ortiz.   Nutrition: Iman Armando, MAEVE Perez, Pending sale to [facility], Surgical resident Everton Walker, PGY3, and examined by Dr. Espinal. Disciplines not in attendance will be notified of the plan.      HPI: 49M with significant PMHx of ESRD on HD  (no bx, since 2013 with  TTS via LUE AVF, anuric), HTN, Gout, Glaucoma, NET of pancreas (s/p robotic distal panc/splenectomy at [place] 2015 by Dr. Young, followed by Dr. Raphael, [facility] Oncologist), RA with hand contractures, now s/p DDRT on 12/8/19 POD#1.    Last colonoscopy: clear 2017  Onc: cleared by [place] 2019 (low grade NET, risk of HD greater than recurrence of low grade NET, recent PET/MRI neg)    Reccipient info:   CPRA:    o%  ABO:      A+  CMVAb:  Positive  EBVIgG Status:  Positive  Last HD:  12/7/2019    Donor ID:  [id number]  Match: [id number]  OPO: NYRT  Age:  49  ABO:  A  KDPI 81%  COD:  Anoxia  X Clamp Time:  12/7/2019 1538.  Medical Hx: DM, HTN, HLD, obesity BMI 53, CAD, CABAGX3  Terminal Cr:  1/1.8/1.7  CMV- Neg EBV- Neg    OR:    DDRT to right iliac fossa. Simulect induction. Two arteries anastomosed to a single cuff on the back table. One vein, one ureter. Ureteral anastomosis performed over a double J stent, which was sutured to the martinez. Cold ischemia time 25.5 hours.    Interval Events:  -c/o martinez discomfort  minimal urine output 60 cc   BO output serosanguineous  Plts downtrending 47 from 72 and LDH up trending 589 from 517        Potential Discharge date: Pending clinical evaluation  Education: Medication and post op care      Plan of care: See below       MEDICATIONS  (STANDING):  allopurinol 100 milliGRAM(s) Oral daily  brimonidine 0.2% Ophthalmic Solution 1 Drop(s) Left EYE three times a day  chlorhexidine 4% Liquid 1 Application(s) Topical <User Schedule>  desmopressin IVPB 19 MICROGram(s) IV Intermittent once  dextrose 5%. 1000 milliLiter(s) (50 mL/Hr) IV Continuous <Continuous>  dextrose 50% Injectable 12.5 Gram(s) IV Push once  dextrose 50% Injectable 25 Gram(s) IV Push once  dextrose 50% Injectable 25 Gram(s) IV Push once  dorzolamide 2% Ophthalmic Solution 1 Drop(s) Left EYE three times a day  famotidine    Tablet 20 milliGRAM(s) Oral daily  gabapentin 100 milliGRAM(s) Oral two times a day  hydrALAZINE 50 milliGRAM(s) Oral two times a day  insulin lispro (HumaLOG) corrective regimen sliding scale   SubCutaneous three times a day before meals  latanoprost 0.005% Ophthalmic Solution 1 Drop(s) Left EYE at bedtime  metoprolol tartrate 25 milliGRAM(s) Oral two times a day  mycophenolate mofetil 1 Gram(s) Oral every 12 hours  nystatin    Suspension 547711 Unit(s) Swish and Swallow four times a day  predniSONE   Tablet 20 milliGRAM(s) Oral two times a day  sodium chloride 0.9% lock flush 3 milliLiter(s) IV Push every 8 hours  trimethoprim   80 mG/sulfamethoxazole 400 mG 1 Tablet(s) Oral daily  valGANciclovir 450 milliGRAM(s) Oral <User Schedule>    MEDICATIONS  (PRN):  acetaminophen   Tablet .. 650 milliGRAM(s) Oral every 6 hours PRN Mild Pain (1 - 3)  dextrose 40% Gel 15 Gram(s) Oral once PRN Blood Glucose LESS THAN 70 milliGRAM(s)/deciliter  glucagon  Injectable 1 milliGRAM(s) IntraMuscular once PRN Glucose LESS THAN 70 milligrams/deciliter  lidocaine 2% Jelly 10 milliLiter(s) IntraUrethral three times a day PRN Ureteral irritation  ondansetron Injectable 4 milliGRAM(s) IV Push every 6 hours PRN Nausea and/or Vomiting  traMADol 25 milliGRAM(s) Oral every 4 hours PRN Moderate Pain (4 - 6)  traMADol 50 milliGRAM(s) Oral every 6 hours PRN Severe Pain (7 - 10)      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  Carpal tunnel syndrome  Brain cyst: had MRI recently- now gone  Anemia  Gastric ulcer: Long time ago  Rheumatoid arthritis  Breakdown (mechanical) of penile (implanted) prosthesis, sequela  End-stage renal disease (ESRD): PERMACATH PLACEMENT  Abscess of left buttock: s/p I &amp; D  AV fistula: placement left lower arm  S/P cystoscopy: stent placement &amp; removal for kidney stones, lithiotripsy  s/p Lt Carpal tunnel: 2011      Vital Signs Last 24 Hrs  T(C): 36.5 (12 Dec 2019 13:10), Max: 36.8 (11 Dec 2019 17:00)  T(F): 97.7 (12 Dec 2019 13:10), Max: 98.2 (11 Dec 2019 17:00)  HR: 72 (12 Dec 2019 13:10) (70 - 100)  BP: 161/99 (12 Dec 2019 13:10) (152/84 - 167/96)  BP(mean): --  RR: 19 (12 Dec 2019 13:10) (18 - 19)  SpO2: 98% (12 Dec 2019 13:10) (96% - 100%)    I&O's Summary    11 Dec 2019 07:01  -  12 Dec 2019 07:00  --------------------------------------------------------  IN: 1000 mL / OUT: 80 mL / NET: 920 mL    12 Dec 2019 07:01  -  12 Dec 2019 15:00  --------------------------------------------------------  IN: 360 mL / OUT: 15 mL / NET: 345 mL          LABS:                        8.3    15.78 )-----------( 45       ( 12 Dec 2019 11:17 )             24.3     12-12    131<L>  |  88<L>  |  41<H>  ----------------------------<  111<H>  4.8   |  21<L>  |  6.38<H>    Ca    8.5      12 Dec 2019 06:30  Phos  5.8     12-12  Mg     2.0     12-12      PT/INR - ( 12 Dec 2019 12:41 )   PT: 11.4 sec;   INR: 0.99 ratio         PTT - ( 12 Dec 2019 12:41 )  PTT:27.0 sec    CAPILLARY BLOOD GLUCOSE      POCT Blood Glucose.: 126 mg/dL (12 Dec 2019 13:00)  POCT Blood Glucose.: 120 mg/dL (12 Dec 2019 09:29)  POCT Blood Glucose.: 150 mg/dL (11 Dec 2019 17:53)        Culture - Nose (collected 12-10-19 @ 17:21)  Source: .Nose Nose  Preliminary Report (12-11-19 @ 15:43):    Culture in progress      Tacrolimus (), Serum: 4.5 ng/mL (12-12 @ 08:19)      Cultures:    Culture - Nose (collected 12-10-19 @ 17:21)  Source: .Nose Nose  Preliminary Report (12-11-19 @ 15:43):    Culture in progress          REVIEW OF SYSTEMS  --------------------------------------------------------------------------------  Gen: No weight changes, fatigue, fevers/chills, weakness  Skin: No rashes  Head/Eyes/Ears/Mouth: No headache; Normal hearing; Normal vision w/o blurriness; No sinus pain/discomfort, sore throat  Respiratory: No dyspnea, cough, wheezing, hemoptysis  CV: No chest pain, PND, orthopnea  GI: No abdominal pain, diarrhea, constipation, nausea, vomiting, melena, hematochezia  : c/o martinez pain No increased frequency, dysuria, hematuria, nocturia  MSK: No joint pain/swelling; no back pain; no edema  Neuro: No dizziness/lightheadedness, weakness, seizures, numbness, tingling  Heme: No easy bruising or bleeding  Endo: No heat/cold intolerance  Psych: No significant nervousness, anxiety, stress, depression  All other systems were reviewed and are negative, except as noted.      PHYSICAL EXAM:  Constitutional: Well developed / well nourished  Eyes: Anicteric, PERRLA  ENMT: nc/at  Neck: Supple, RT IJ TLC C/D/I no sign of erythema or infection noted  Respiratory: CTA B/L  Cardiovascular: RRR  Gastrointestinal: Soft abdomen, NT, ND  Genitourinary:  Martinez in place  Extremities: SCD's in place and working bilaterally  Vascular: Palpable dp pulses bilaterally  Neurological: A&O x3  Skin: Wound dressing changed with dry ASIF jackson  Musculoskeletal: Moving all extremities  Psychiatric: Responsive

## 2019-12-12 NOTE — PROGRESS NOTE ADULT - SUBJECTIVE AND OBJECTIVE BOX
[facility] DIVISION OF KIDNEY DISEASES AND HYPERTENSION -- FOLLOW UP NOTE  --------------------------------------------------------------------------------  Chief Complaint: s/p DDRT    24 hour events/subjective: Patient evaluated at bedside, in no acute distress. Noted to have decreased platelets and elevated LDH, concern for possible TMA.     PAST HISTORY  --------------------------------------------------------------------------------  No significant changes to PMH, PSH, FHx, SHx, unless otherwise noted    ALLERGIES & MEDICATIONS  --------------------------------------------------------------------------------  Allergies    coconut (Anaphylaxis)  morphine (Pruritus; Rash)    Intolerances      Standing Inpatient Medications  allopurinol 100 milliGRAM(s) Oral daily  brimonidine 0.2% Ophthalmic Solution 1 Drop(s) Left EYE three times a day  chlorhexidine 4% Liquid 1 Application(s) Topical <User Schedule>  desmopressin IVPB 19 MICROGram(s) IV Intermittent once  dextrose 5%. 1000 milliLiter(s) IV Continuous <Continuous>  dextrose 50% Injectable 12.5 Gram(s) IV Push once  dextrose 50% Injectable 25 Gram(s) IV Push once  dextrose 50% Injectable 25 Gram(s) IV Push once  dorzolamide 2% Ophthalmic Solution 1 Drop(s) Left EYE three times a day  famotidine    Tablet 20 milliGRAM(s) Oral daily  gabapentin 100 milliGRAM(s) Oral two times a day  hydrALAZINE 50 milliGRAM(s) Oral two times a day  insulin lispro (HumaLOG) corrective regimen sliding scale   SubCutaneous three times a day before meals  latanoprost 0.005% Ophthalmic Solution 1 Drop(s) Left EYE at bedtime  metoprolol tartrate 25 milliGRAM(s) Oral two times a day  mycophenolate mofetil 1 Gram(s) Oral every 12 hours  nystatin    Suspension [id number] Unit(s) Swish and Swallow four times a day  predniSONE   Tablet 20 milliGRAM(s) Oral two times a day  sodium chloride 0.9% lock flush 3 milliLiter(s) IV Push every 8 hours  trimethoprim   80 mG/sulfamethoxazole 400 mG 1 Tablet(s) Oral daily  valGANciclovir 450 milliGRAM(s) Oral <User Schedule>    REVIEW OF SYSTEMS  --------------------------------------------------------------------------------  Gen: No fatigue, fevers/chills, weakness  Skin: No rashes  Head/Eyes/Ears/Mouth: No headache;No sore throat  Respiratory: No dyspnea, cough,   CV: No chest pain, PND, orthopnea  GI: No abdominal pain, diarrhea, constipation, nausea, vomiting  Transplant: No pain  : No increased frequency, dysuria, hematuria, nocturia  MSK: No joint pain/swelling; no back pain; no edema  Neuro: No dizziness/lightheadedness, weakness, seizures, numbness, tingling  Psych: No significant nervousness, anxiety, stress, depression    All other systems were reviewed and are negative, except as noted.    VITALS/PHYSICAL EXAM  --------------------------------------------------------------------------------  T(C): 36.5 (12-12-19 @ 13:10), Max: 36.8 (12-11-19 @ 17:00)  HR: 72 (12-12-19 @ 13:10) (70 - 100)  BP: 161/99 (12-12-19 @ 13:10) (152/84 - 167/96)  RR: 19 (12-12-19 @ 13:10) (18 - 19)  SpO2: 98% (12-12-19 @ 13:10) (96% - 100%)  Wt(kg): --    12-11-19 @ 07:01  -  12-12-19 @ 07:00  --------------------------------------------------------  IN: 1000 mL / OUT: 80 mL / NET: 920 mL    12-12-19 @ 07:01  -  12-12-19 @ 14:41  --------------------------------------------------------  IN: 360 mL / OUT: 15 mL / NET: 345 mL    Physical Exam:  	Gen: NAD, well-appearing  	HEENT: PERRL, supple neck, clear oropharynx  	Pulm: CTA B/L  	CV: RRR, S1S2; no rub  	Back: No spinal or CVA tenderness; no sacral edema  	Abd: +BS, soft, nontender/nondistended              Transplant: No tenderness, swelling  	: No suprapubic tenderness  	UE: Warm, FROM, intact strength; no edema; no asterixis  	LE: Warm, FROM, intact strength; no edema  	Neuro: No focal deficits, intact gait  	Psych: Normal affect and mood  	Skin: Warm, without rashes    LABS/STUDIES  --------------------------------------------------------------------------------              8.3    15.78 >-----------<  45       [12-12-19 @ 11:17]              24.3     131  |  88  |  41  ----------------------------<  111      [12-12-19 @ 06:30]  4.8   |  21  |  6.38        Ca     8.5     [12-12-19 @ 06:30]      Mg     2.0     [12-12-19 @ 06:30]      Phos  5.8     [12-12-19 @ 06:30]      PT/INR: PT 11.4 , INR 0.99       [12-12-19 @ 12:41]  PTT: 27.0       [12-12-19 @ 12:41]          [12-12-19 @ 06:30]    Creatinine Trend:  SCr 6.38 [12-12 @ 06:30]  SCr 4.92 [12-11 @ 06:24]  SCr 7.29 [12-10 @ 11:48]  SCr 7.21 [12-10 @ 06:37]  SCr 5.79 [12-09 @ 00:45]    Tacrolimus (), Serum: 4.5 ng/mL (12-12 @ 08:19)  Tacrolimus (), Serum: 3.7 ng/mL (12-11 @ 07:10)  Tacrolimus (), Serum: 2.4 ng/mL (12-10 @ 07:18)  Tacrolimus (), Serum: <2.0 ng/mL (12-09 @ 07:28)

## 2019-12-12 NOTE — PROGRESS NOTE ADULT - SUBJECTIVE AND OBJECTIVE BOX
No distress    Vital Signs Last 24 Hrs  T(C): 36.5 (12-12-19 @ 13:10), Max: 36.8 (12-11-19 @ 17:00)  T(F): 97.7 (12-12-19 @ 13:10), Max: 98.2 (12-11-19 @ 17:00)  HR: 72 (12-12-19 @ 13:10) (70 - 100)  BP: 161/99 (12-12-19 @ 13:10) (152/84 - 167/96)  RR: 19 (12-12-19 @ 13:10) (18 - 19)  SpO2: 98% (12-12-19 @ 13:10) (96% - 100%)    I&O's Summary    11 Dec 2019 07:01  -  12 Dec 2019 07:00  --------------------------------------------------------  IN: 1000 mL / OUT: 80 mL / NET: 920 mL    12 Dec 2019 07:01  -  12 Dec 2019 13:58  --------------------------------------------------------  IN: 360 mL / OUT: 10 mL / NET: 350 mL    Card S1S2  Lungs b/l air entry  Abd soft  Extr no edema, AVF patent                        8.3    15.78 )-----------( 45       ( 12 Dec 2019 11:17 )             24.3     12 Dec 2019 06:30    131    |  88     |  41     ----------------------------<  111    4.8     |  21     |  6.38     Ca    8.5        12 Dec 2019 06:30  Phos  5.8       12 Dec 2019 06:30  Mg     2.0       12 Dec 2019 06:30    acetaminophen   Tablet .. 650 milliGRAM(s) Oral every 6 hours PRN  allopurinol 100 milliGRAM(s) Oral daily  brimonidine 0.2% Ophthalmic Solution 1 Drop(s) Left EYE three times a day  chlorhexidine 4% Liquid 1 Application(s) Topical <User Schedule>  desmopressin IVPB 19 MICROGram(s) IV Intermittent once  dextrose 40% Gel 15 Gram(s) Oral once PRN  dextrose 5%. 1000 milliLiter(s) IV Continuous <Continuous>  dextrose 50% Injectable 12.5 Gram(s) IV Push once  dextrose 50% Injectable 25 Gram(s) IV Push once  dextrose 50% Injectable 25 Gram(s) IV Push once  dorzolamide 2% Ophthalmic Solution 1 Drop(s) Left EYE three times a day  famotidine    Tablet 20 milliGRAM(s) Oral daily  gabapentin 100 milliGRAM(s) Oral two times a day  glucagon  Injectable 1 milliGRAM(s) IntraMuscular once PRN  hydrALAZINE 50 milliGRAM(s) Oral two times a day  insulin lispro (HumaLOG) corrective regimen sliding scale   SubCutaneous three times a day before meals  latanoprost 0.005% Ophthalmic Solution 1 Drop(s) Left EYE at bedtime  lidocaine 2% Jelly 10 milliLiter(s) IntraUrethral three times a day PRN  metoprolol tartrate 25 milliGRAM(s) Oral two times a day  mycophenolate mofetil 1 Gram(s) Oral every 12 hours  nystatin    Suspension [id number] Unit(s) Swish and Swallow four times a day  ondansetron Injectable 4 milliGRAM(s) IV Push every 6 hours PRN  predniSONE   Tablet 20 milliGRAM(s) Oral two times a day  sodium chloride 0.9% lock flush 3 milliLiter(s) IV Push every 8 hours  traMADol 25 milliGRAM(s) Oral every 4 hours PRN  traMADol 50 milliGRAM(s) Oral every 6 hours PRN  trimethoprim   80 mG/sulfamethoxazole 400 mG 1 Tablet(s) Oral daily  valGANciclovir 450 milliGRAM(s) Oral <User Schedule>    A/P:    ESRD on HD  S/p DDRT on 12/8/2019  DGF  Immunosuppresion/further transplant management per transplant team  As pt is oligo-anuric, will continue HD 3 x week  HD today  Heparin free  Will follow

## 2019-12-12 NOTE — CONSULT NOTE ADULT - ASSESSMENT
49 year old male with ESRD on HD  a/p kidney transplant on 12/8, HTN, Gout, Glaucoma, NET of pancreas (s/p robotic distal panc/splenectomy at [place] 2015 by Dr. Leigh, followed by Dr. Raphael, [facility] Oncologist), RA with hand contractures, presents for potential DDRT was admitted for elective kidney transplant. Hematology was consulted for elevated LHD and new thrombocytopenia.      # TMA   Smear shows average >3 schistocytes. LDH is elevated and hpt is low. Hgb is chronically low likely 2/2 ESRD. Plt was in 200s on admission and now 40s. Unable to assess kidney function due to ESRD and recent transplant. Pt is currently on tacrolimus, which could cause drug induced TMA.     -Daily CBC with diff, CMP, Hapto, LDH, retic daily  -Will check smear tomorrow again 49 year old male with ESRD on HD  a/p kidney transplant on 12/8, HTN, Gout, Glaucoma, NET of pancreas (s/p robotic distal panc/splenectomy at [place] 2015 by Dr. Leigh, followed by Dr. Raphael, [facility] Oncologist), RA with hand contractures, presents for potential DDRT was admitted for elective kidney transplant. Hematology was consulted for elevated LHD and new thrombocytopenia.      # TMA   Smear shows average >3 schistocytes. LDH is elevated and hpt is low. Hgb is chronically low likely 2/2 ESRD. Plt was in 200s on admission and now 40s. Unable to assess kidney function due to ESRD and recent transplant. TTP cannot completely ruled out. Pt is currently on tacrolimus, which could cause drug induced TMA.   -Daily CBC with diff, CMP, Hapto, LDH, retic daily  -Will check smear tomorrow again  -BÁRBARA TS 13 was sent  -Discussed the case with blood bank attending  -Prednisone 1mg/kg daily until BÁRBARA TS 13 comes back  -Please consider stop tacro since it can cause drug-induced TMA    The case was discussed with Dr. Ben Baird MD, MPH  Hematology/Oncology Fellow  Pager: [phone]

## 2019-12-13 ENCOUNTER — RESULT REVIEW (OUTPATIENT)
Age: 49
End: 2019-12-13

## 2019-12-13 LAB
ALBUMIN SERPL ELPH-MCNC: 3.3 G/DL — SIGNIFICANT CHANGE UP (ref 3.3–5)
ALP SERPL-CCNC: 70 U/L — SIGNIFICANT CHANGE UP (ref 40–120)
ALT FLD-CCNC: 14 U/L — SIGNIFICANT CHANGE UP (ref 10–45)
ANION GAP SERPL CALC-SCNC: 14 MMOL/L — SIGNIFICANT CHANGE UP (ref 5–17)
AST SERPL-CCNC: 35 U/L — SIGNIFICANT CHANGE UP (ref 10–40)
BASOPHILS # BLD AUTO: 0 K/UL — SIGNIFICANT CHANGE UP (ref 0–0.2)
BASOPHILS NFR BLD AUTO: 0 % — SIGNIFICANT CHANGE UP (ref 0–2)
BILIRUB SERPL-MCNC: 1.3 MG/DL — HIGH (ref 0.2–1.2)
BUN SERPL-MCNC: 23 MG/DL — SIGNIFICANT CHANGE UP (ref 7–23)
CALCIUM SERPL-MCNC: 8.4 MG/DL — SIGNIFICANT CHANGE UP (ref 8.4–10.5)
CHLORIDE SERPL-SCNC: 95 MMOL/L — LOW (ref 96–108)
CO2 SERPL-SCNC: 29 MMOL/L — SIGNIFICANT CHANGE UP (ref 22–31)
CREAT SERPL-MCNC: 4.35 MG/DL — HIGH (ref 0.5–1.3)
CULTURE RESULTS: SIGNIFICANT CHANGE UP
EOSINOPHIL # BLD AUTO: 0 K/UL — SIGNIFICANT CHANGE UP (ref 0–0.5)
EOSINOPHIL NFR BLD AUTO: 0 % — SIGNIFICANT CHANGE UP (ref 0–6)
GLUCOSE BLDC GLUCOMTR-MCNC: 143 MG/DL — HIGH (ref 70–99)
GLUCOSE BLDC GLUCOMTR-MCNC: 143 MG/DL — HIGH (ref 70–99)
GLUCOSE SERPL-MCNC: 102 MG/DL — HIGH (ref 70–99)
HAPTOGLOB SERPL-MCNC: 52 MG/DL — SIGNIFICANT CHANGE UP (ref 34–200)
HAV IGM SER-ACNC: SIGNIFICANT CHANGE UP
HBV CORE IGM SER-ACNC: SIGNIFICANT CHANGE UP
HBV SURFACE AG SER-ACNC: SIGNIFICANT CHANGE UP
HCT VFR BLD CALC: 21.8 % — LOW (ref 39–50)
HCT VFR BLD CALC: 22.7 % — LOW (ref 39–50)
HCV AB S/CO SERPL IA: 0.15 S/CO — SIGNIFICANT CHANGE UP (ref 0–0.99)
HCV AB SERPL-IMP: SIGNIFICANT CHANGE UP
HEPARIN-PF4 AB RESULT: <0.6 U/ML — SIGNIFICANT CHANGE UP (ref 0–0.9)
HGB BLD-MCNC: 7.5 G/DL — LOW (ref 13–17)
HGB BLD-MCNC: 7.6 G/DL — LOW (ref 13–17)
LDH SERPL L TO P-CCNC: 300 U/L — HIGH (ref 50–242)
LYMPHOCYTES # BLD AUTO: 15 % — SIGNIFICANT CHANGE UP (ref 13–44)
LYMPHOCYTES # BLD AUTO: 2.33 K/UL — SIGNIFICANT CHANGE UP (ref 1–3.3)
MAGNESIUM SERPL-MCNC: 1.9 MG/DL — SIGNIFICANT CHANGE UP (ref 1.6–2.6)
MCHC RBC-ENTMCNC: 33.5 GM/DL — SIGNIFICANT CHANGE UP (ref 32–36)
MCHC RBC-ENTMCNC: 33.6 PG — SIGNIFICANT CHANGE UP (ref 27–34)
MCHC RBC-ENTMCNC: 34.4 GM/DL — SIGNIFICANT CHANGE UP (ref 32–36)
MCHC RBC-ENTMCNC: 34.4 PG — HIGH (ref 27–34)
MCV RBC AUTO: 100 FL — SIGNIFICANT CHANGE UP (ref 80–100)
MCV RBC AUTO: 100.4 FL — HIGH (ref 80–100)
MONOCYTES # BLD AUTO: 1.4 K/UL — HIGH (ref 0–0.9)
MONOCYTES NFR BLD AUTO: 9 % — SIGNIFICANT CHANGE UP (ref 2–14)
NEUTROPHILS # BLD AUTO: 11.66 K/UL — HIGH (ref 1.8–7.4)
NEUTROPHILS NFR BLD AUTO: 75 % — SIGNIFICANT CHANGE UP (ref 43–77)
NRBC # BLD: 4 /100 WBCS — HIGH (ref 0–0)
PF4 HEPARIN CMPLX AB SER-ACNC: NEGATIVE — SIGNIFICANT CHANGE UP
PHOSPHATE SERPL-MCNC: 3.8 MG/DL — SIGNIFICANT CHANGE UP (ref 2.5–4.5)
PLATELET # BLD AUTO: 42 K/UL — LOW (ref 150–400)
PLATELET # BLD AUTO: 72 K/UL — LOW (ref 150–400)
POTASSIUM SERPL-MCNC: 3.2 MMOL/L — LOW (ref 3.5–5.3)
POTASSIUM SERPL-SCNC: 3.2 MMOL/L — LOW (ref 3.5–5.3)
PROT SERPL-MCNC: 5.9 G/DL — LOW (ref 6–8.3)
RBC # BLD: 2.18 M/UL — LOW (ref 4.2–5.8)
RBC # BLD: 2.26 M/UL — LOW (ref 4.2–5.8)
RBC # BLD: 2.29 M/UL — LOW (ref 4.2–5.8)
RBC # FLD: 19 % — HIGH (ref 10.3–14.5)
RBC # FLD: 19.3 % — HIGH (ref 10.3–14.5)
RETICS #: 178.6 K/UL — HIGH (ref 25–125)
RETICS/RBC NFR: 7.8 % — HIGH (ref 0.5–2.5)
SODIUM SERPL-SCNC: 138 MMOL/L — SIGNIFICANT CHANGE UP (ref 135–145)
SPECIMEN SOURCE: SIGNIFICANT CHANGE UP
TACROLIMUS SERPL-MCNC: 5.2 NG/ML — SIGNIFICANT CHANGE UP
WBC # BLD: 14.14 K/UL — HIGH (ref 3.8–10.5)
WBC # BLD: 15.55 K/UL — HIGH (ref 3.8–10.5)
WBC # FLD AUTO: 14.14 K/UL — HIGH (ref 3.8–10.5)
WBC # FLD AUTO: 15.55 K/UL — HIGH (ref 3.8–10.5)

## 2019-12-13 PROCEDURE — 88312 SPECIAL STAINS GROUP 1: CPT | Mod: 26

## 2019-12-13 PROCEDURE — 88348 ELECTRON MICROSCOPY DX: CPT | Mod: 26

## 2019-12-13 PROCEDURE — 88305 TISSUE EXAM BY PATHOLOGIST: CPT | Mod: 26

## 2019-12-13 PROCEDURE — 76942 ECHO GUIDE FOR BIOPSY: CPT | Mod: 26

## 2019-12-13 PROCEDURE — 99231 SBSQ HOSP IP/OBS SF/LOW 25: CPT | Mod: GC

## 2019-12-13 PROCEDURE — 88342 IMHCHEM/IMCYTCHM 1ST ANTB: CPT | Mod: 26

## 2019-12-13 PROCEDURE — 50200 RENAL BIOPSY PERQ: CPT | Mod: RT

## 2019-12-13 PROCEDURE — 88346 IMFLUOR 1ST 1ANTB STAIN PX: CPT | Mod: 26

## 2019-12-13 PROCEDURE — 99232 SBSQ HOSP IP/OBS MODERATE 35: CPT | Mod: GC

## 2019-12-13 PROCEDURE — 88313 SPECIAL STAINS GROUP 2: CPT | Mod: 26

## 2019-12-13 PROCEDURE — 88350 IMFLUOR EA ADDL 1ANTB STN PX: CPT | Mod: 26

## 2019-12-13 RX ORDER — BELATACEPT 250 MG/1
650 INJECTION, POWDER, LYOPHILIZED, FOR SOLUTION INTRAVENOUS ONCE
Refills: 0 | Status: COMPLETED | OUTPATIENT
Start: 2019-12-13 | End: 2019-12-13

## 2019-12-13 RX ORDER — NEISSERIA MENINGITIDIS GROUP A CAPSULAR POLYSACCHARIDE TETANUS TOXOID CONJUGATE ANTIGEN, NEISSERIA MENINGITIDIS GROUP C CAPSULAR POLYSACCHARIDE TETANUS TOXOID CONJUGATE ANTIGEN, NEISSERIA MENINGITIDIS GROUP Y CAPSULAR POLYSACCHARIDE TETANUS TOXOID CONJUGATE ANTIGEN, AND NEISSERIA MENINGITIDIS GROUP W-135 CAPSULAR POLYSACCHARIDE TETANUS TOXOID CONJUGATE ANTIGEN 10; 10; 10; 10 UG/.5ML; UG/.5ML; UG/.5ML; UG/.5ML
0.5 INJECTION, SOLUTION INTRAMUSCULAR ONCE
Refills: 0 | Status: COMPLETED | OUTPATIENT
Start: 2019-12-13 | End: 2019-12-13

## 2019-12-13 RX ORDER — NEISSERIA MENINGITIDIS SEROGROUP B NHBA FUSION PROTEIN ANTIGEN, NEISSERIA MENINGITIDIS SEROGROUP B FHBP FUSION PROTEIN ANTIGEN AND NEISSERIA MENINGITIDIS SEROGROUP B NADA PROTEIN ANTIGEN 50; 50; 50; 25 UG/.5ML; UG/.5ML; UG/.5ML; UG/.5ML
0.5 INJECTION, SUSPENSION INTRAMUSCULAR ONCE
Refills: 0 | Status: COMPLETED | OUTPATIENT
Start: 2019-12-13 | End: 2019-12-13

## 2019-12-13 RX ORDER — MAGNESIUM SULFATE 500 MG/ML
1 VIAL (ML) INJECTION ONCE
Refills: 0 | Status: COMPLETED | OUTPATIENT
Start: 2019-12-13 | End: 2019-12-13

## 2019-12-13 RX ADMIN — DORZOLAMIDE HYDROCHLORIDE 1 DROP(S): 20 SOLUTION/ DROPS OPHTHALMIC at 05:23

## 2019-12-13 RX ADMIN — BRIMONIDINE TARTRATE 1 DROP(S): 2 SOLUTION/ DROPS OPHTHALMIC at 05:22

## 2019-12-13 RX ADMIN — GABAPENTIN 100 MILLIGRAM(S): 400 CAPSULE ORAL at 18:27

## 2019-12-13 RX ADMIN — Medication 25 MILLIGRAM(S): at 05:24

## 2019-12-13 RX ADMIN — Medication 100 MILLIGRAM(S): at 12:10

## 2019-12-13 RX ADMIN — DESMOPRESSIN ACETATE 219 MICROGRAM(S): 0.1 TABLET ORAL at 13:21

## 2019-12-13 RX ADMIN — TRAMADOL HYDROCHLORIDE 25 MILLIGRAM(S): 50 TABLET ORAL at 09:24

## 2019-12-13 RX ADMIN — TRAMADOL HYDROCHLORIDE 50 MILLIGRAM(S): 50 TABLET ORAL at 06:23

## 2019-12-13 RX ADMIN — Medication 500000 UNIT(S): at 12:10

## 2019-12-13 RX ADMIN — BRIMONIDINE TARTRATE 1 DROP(S): 2 SOLUTION/ DROPS OPHTHALMIC at 17:47

## 2019-12-13 RX ADMIN — Medication 50 MILLIGRAM(S): at 08:17

## 2019-12-13 RX ADMIN — DORZOLAMIDE HYDROCHLORIDE 1 DROP(S): 20 SOLUTION/ DROPS OPHTHALMIC at 17:47

## 2019-12-13 RX ADMIN — Medication 500000 UNIT(S): at 05:24

## 2019-12-13 RX ADMIN — MYCOPHENOLATE MOFETIL 1 GRAM(S): 250 CAPSULE ORAL at 18:27

## 2019-12-13 RX ADMIN — GABAPENTIN 100 MILLIGRAM(S): 400 CAPSULE ORAL at 05:23

## 2019-12-13 RX ADMIN — TRAMADOL HYDROCHLORIDE 50 MILLIGRAM(S): 50 TABLET ORAL at 05:23

## 2019-12-13 RX ADMIN — TRAMADOL HYDROCHLORIDE 50 MILLIGRAM(S): 50 TABLET ORAL at 12:03

## 2019-12-13 RX ADMIN — TRAMADOL HYDROCHLORIDE 25 MILLIGRAM(S): 50 TABLET ORAL at 00:50

## 2019-12-13 RX ADMIN — DORZOLAMIDE HYDROCHLORIDE 1 DROP(S): 20 SOLUTION/ DROPS OPHTHALMIC at 22:15

## 2019-12-13 RX ADMIN — CHLORHEXIDINE GLUCONATE 1 APPLICATION(S): 213 SOLUTION TOPICAL at 06:44

## 2019-12-13 RX ADMIN — NEISSERIA MENINGITIDIS GROUP A CAPSULAR POLYSACCHARIDE TETANUS TOXOID CONJUGATE ANTIGEN, NEISSERIA MENINGITIDIS GROUP C CAPSULAR POLYSACCHARIDE TETANUS TOXOID CONJUGATE ANTIGEN, NEISSERIA MENINGITIDIS GROUP Y CAPSULAR POLYSACCHARIDE TETANUS TOXOID CONJUGATE ANTIGEN, AND NEISSERIA MENINGITIDIS GROUP W-135 CAPSULAR POLYSACCHARIDE TETANUS TOXOID CONJUGATE ANTIGEN 0.5 MILLILITER(S): 10; 10; 10; 10 INJECTION, SOLUTION INTRAMUSCULAR at 18:49

## 2019-12-13 RX ADMIN — BRIMONIDINE TARTRATE 1 DROP(S): 2 SOLUTION/ DROPS OPHTHALMIC at 22:15

## 2019-12-13 RX ADMIN — NEISSERIA MENINGITIDIS SEROGROUP B NHBA FUSION PROTEIN ANTIGEN, NEISSERIA MENINGITIDIS SEROGROUP B FHBP FUSION PROTEIN ANTIGEN AND NEISSERIA MENINGITIDIS SEROGROUP B NADA PROTEIN ANTIGEN 0.5 MILLILITER(S): 50; 50; 50; 25 INJECTION, SUSPENSION INTRAMUSCULAR at 18:44

## 2019-12-13 RX ADMIN — Medication 100 GRAM(S): at 08:44

## 2019-12-13 RX ADMIN — Medication 60 MILLIGRAM(S): at 05:24

## 2019-12-13 RX ADMIN — SODIUM CHLORIDE 3 MILLILITER(S): 9 INJECTION INTRAMUSCULAR; INTRAVENOUS; SUBCUTANEOUS at 22:11

## 2019-12-13 RX ADMIN — TRAMADOL HYDROCHLORIDE 50 MILLIGRAM(S): 50 TABLET ORAL at 11:33

## 2019-12-13 RX ADMIN — SODIUM CHLORIDE 3 MILLILITER(S): 9 INJECTION INTRAMUSCULAR; INTRAVENOUS; SUBCUTANEOUS at 12:28

## 2019-12-13 RX ADMIN — Medication 1 TABLET(S): at 12:10

## 2019-12-13 RX ADMIN — TRAMADOL HYDROCHLORIDE 25 MILLIGRAM(S): 50 TABLET ORAL at 09:55

## 2019-12-13 RX ADMIN — Medication 500000 UNIT(S): at 18:27

## 2019-12-13 RX ADMIN — LATANOPROST 1 DROP(S): 0.05 SOLUTION/ DROPS OPHTHALMIC; TOPICAL at 22:15

## 2019-12-13 RX ADMIN — FAMOTIDINE 20 MILLIGRAM(S): 10 INJECTION INTRAVENOUS at 12:10

## 2019-12-13 RX ADMIN — Medication 500000 UNIT(S): at 23:11

## 2019-12-13 RX ADMIN — SODIUM CHLORIDE 3 MILLILITER(S): 9 INJECTION INTRAMUSCULAR; INTRAVENOUS; SUBCUTANEOUS at 06:44

## 2019-12-13 RX ADMIN — BELATACEPT 200 MILLIGRAM(S): 250 INJECTION, POWDER, LYOPHILIZED, FOR SOLUTION INTRAVENOUS at 18:25

## 2019-12-13 RX ADMIN — MYCOPHENOLATE MOFETIL 1 GRAM(S): 250 CAPSULE ORAL at 06:44

## 2019-12-13 NOTE — CHART NOTE - NSCHARTNOTEFT_GEN_A_CORE
------------------------------------------------------------  Interventional Radiology Pre-Procedure Note  ------------------------------------------------------------    Procedure: transplant renal biopsy    Indication: 49y Male s/p  RLQ DDRT on 12/8/19, with rising creatinine and worsening thrombocytopenia. Patient is referred to interventional radiology for biopsy to exclude rejection and/or thrombocytopenic microangiopathy.    Past Medical History:    Erectile dysfunction  Glaucoma  Gout  HTN (hypertension)  ESRD (end stage renal disease) on dialysis  Contracture of finger joint  Carpal tunnel syndrome  Brain cyst  Anemia  Renal insufficiency  Gastric ulcer  Rheumatoid arthritis      Allergies: coconut (Anaphylaxis)  morphine (Pruritus; Rash)      Vital Signs: T(F): 97.7 (09:32), Max: 98.6 (06:55)  HR: 70 (09:35)  BP: 105/57 (09:35)  RR: 18 (09:32)  SpO2: 94% (09:32)    Labs:           7.5  14.14)-----(72     (12-13-19 @ 09:05)         21.8     138 | 95 | 23  ----------------------< 102     (12-13-19 @ 04:53)  3.2 | 29 | 4.35       PT: 11.4 12-12-19 @ 12:41  aPTT: 27.0<L> 12-12-19 @ 12:41   INR: 0.99 12-12-19 @ 12:41    Consent: The risks, benefits and alternatives of the procedure, including the risk of major bleeding given profound thrombocytopenia, were discussed with the patient, who verbalized understanding. Signed consent is available in the paper chart.    Plan: transplant renal biopsy    Everton Cortes MD  PGY5 Interventional Radiology Resident  [phone]) [phone]

## 2019-12-13 NOTE — PROGRESS NOTE ADULT - PROBLEM SELECTOR PLAN 1
Patient s/p DDRT on 12/8/2019. Remains oliguric but electrolytes stable. Planned for HD today. Patient's Envarsus discontinued, continued on Cellcept 1 gm BID, and steroid taper. Patient on antibiotic prophylaxis with Valcyte, Bactrim, and Nystatin. Concern for possible TMA, PS showing multiple shistocytes. Will arrange for biopsy, today. Patient to receive DDAVP and platelet transfusion prior to biopsy. Patient to start Belatacept. Recommend to send HIT panel as well.

## 2019-12-13 NOTE — PROGRESS NOTE ADULT - ASSESSMENT
49M with significant PMHx of ESRD on HD  (no bx, since 2013 with  TTS via LUE AVF, anuric), HTN, Gout, Glaucoma, NET of pancreas (s/p robotic distal panc/splenectomy at [place] 2015 by Dr. Young, followed by Dr. Rapheal, [facility] Oncologist), RA with hand contractures  s/p DDRT on 12/8/19 complicated by DGF requiring HD now with elevated LDH, thrombocytopenia and + schistocytes on smear concerning for TMA.     Kidney replaced by transplant:   - Delayed graft function requiring HD. Oliguric.  HD 12/12  - Elevated LDH and Thrombocytopenia, + Schistocytes all concerning for TMA  - PLEX #1 12/12  - FU DSA sent 12/12  - FU ADAMTS, TMA Complement panel sent 12/12 ([facility])  - Send Genetic susceptibility panel today ( [facility])  - Appreciate Heme consult  - NPO for renal transplant biopsy today  - 1 unit Platelets now.  DDAVP prior to biopsy  - Kennedy for bladder decompression, I/Os  (sutured to stent)  - On PRN Tylenol and tramadol for pain  - Daily Haptoglobin and retic count  - Send Fibrinogen prior to each PLEX   - Immunosuppression: Hold Envarsus.  Continue cellcept.  Prednisone 1mg/kg (60mg) daily until [id number] is resulted as per Heme recommendations  - Simulect today   - PPX: Nystatin, bactrim, Valcyte renally dosed BIW    HTN (Hypertension)  well controlled  Continue Metoprolol and hydralazine

## 2019-12-13 NOTE — CONSULT NOTE ADULT - SUBJECTIVE AND OBJECTIVE BOX
49M with DDRT on has now with downtrending platelet count. Heme consulted blood bank for TMA.    Seen and examined in his room prior to PLEX On . Patient had come back from dialysis and states he is very tired and upset about the kidney not functioning appropriately despite the transplant.   49 year old M with POD#4 DDRT with DGF, with elevated S. cr has been on HD using AVF. Has slow declining platelet count, with 240 on  and 47 on . +schistocytes, LDH in 500s Hapto <20, T.bili normal, Hgb trend since  was 8.5->5.5-> PRBCs TX-> 8.2 on . Patient has no symptoms, and Coags WNL. Scheduled to undergo renal bx on .  Patient on Tacro. [id number] was sent, complement pending and aHUS panel was sent out.      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  Carpal tunnel syndrome  Brain cyst: had MRI recently- now gone  Anemia  Gastric ulcer: Long time ago  Rheumatoid arthritis  Breakdown (mechanical) of penile (implanted) prosthesis, sequela  End-stage renal disease (ESRD): PERMACATH PLACEMENT  Abscess of left buttock: s/p I &amp; D  AV fistula: placement left lower arm  S/P cystoscopy: stent placement &amp; removal for kidney stones, lithiotripsy  s/p Lt Carpal tunnel:       MEDICATIONS  (STANDING):  allopurinol 100 milliGRAM(s) Oral daily  brimonidine 0.2% Ophthalmic Solution 1 Drop(s) Left EYE three times a day  chlorhexidine 4% Liquid 1 Application(s) Topical <User Schedule>  desmopressin IVPB 19 MICROGram(s) IV Intermittent once  dextrose 5%. 1000 milliLiter(s) (50 mL/Hr) IV Continuous <Continuous>  dextrose 50% Injectable 12.5 Gram(s) IV Push once  dextrose 50% Injectable 25 Gram(s) IV Push once  dextrose 50% Injectable 25 Gram(s) IV Push once  dorzolamide 2% Ophthalmic Solution 1 Drop(s) Left EYE three times a day  famotidine    Tablet 20 milliGRAM(s) Oral daily  gabapentin 100 milliGRAM(s) Oral two times a day  hydrALAZINE 50 milliGRAM(s) Oral two times a day  insulin lispro (HumaLOG) corrective regimen sliding scale   SubCutaneous three times a day before meals  latanoprost 0.005% Ophthalmic Solution 1 Drop(s) Left EYE at bedtime  meningococcal Group B (BEXSERO) Vaccine 0.5 milliLiter(s) IntraMuscular once  meningococcal/diphtheria (MENACTRA) Vaccine 0.5 milliLiter(s) IntraMuscular once  metoprolol tartrate 25 milliGRAM(s) Oral two times a day  mycophenolate mofetil 1 Gram(s) Oral every 12 hours  nystatin    Suspension 307228 Unit(s) Swish and Swallow four times a day  predniSONE   Tablet 60 milliGRAM(s) Oral daily  sodium chloride 0.9% lock flush 3 milliLiter(s) IV Push every 8 hours  trimethoprim   80 mG/sulfamethoxazole 400 mG 1 Tablet(s) Oral daily  valGANciclovir 450 milliGRAM(s) Oral <User Schedule>    MEDICATIONS  (PRN):  acetaminophen   Tablet .. 650 milliGRAM(s) Oral every 6 hours PRN Mild Pain (1 - 3)  dextrose 40% Gel 15 Gram(s) Oral once PRN Blood Glucose LESS THAN 70 milliGRAM(s)/deciliter  glucagon  Injectable 1 milliGRAM(s) IntraMuscular once PRN Glucose LESS THAN 70 milligrams/deciliter  lidocaine 2% Jelly 10 milliLiter(s) IntraUrethral three times a day PRN Ureteral irritation  ondansetron Injectable 4 milliGRAM(s) IV Push every 6 hours PRN Nausea and/or Vomiting  traMADol 25 milliGRAM(s) Oral every 4 hours PRN Moderate Pain (4 - 6)  traMADol 50 milliGRAM(s) Oral every 6 hours PRN Severe Pain (7 - 10)      FAMILY HISTORY:  Family history of myocardial infarction in first degree male relative  Family history of rheumatoid arthritis  Family history of hypertension in mother  Family history of cerebral aneurysm (Sibling)        Allergies    coconut (Anaphylaxis)  morphine (Pruritus; Rash)    Intolerances        REVIEW OF SYSTEMS:    CONSTITUTIONAL: No weakness, fevers or chills  EYES/ENT: No visual changes;  No vertigo or throat pain   RESPIRATORY: No cough, wheezing, hemoptysis; No shortness of breath  CARDIOVASCULAR: No chest pain or palpitations  GASTROINTESTINAL: No abdominal or epigastric pain. No nausea, vomiting, or hematemesis; No diarrhea or constipation. No melena or hematochezia.  MUSCULOSKELETAL: Full range of motion  NEUROLOGICAL: No numbness or weakness  HEMATOLOGY: No anemia, no bleeding  SKIN: No itching, burning, rashes, petechia, or lesions  ENDOCRINE: No hx of diabetes, no thyroid disease      Vital Signs Last 24 Hrs  T(C): 36.5 (13 Dec 2019 09:32), Max: 37 (13 Dec 2019 06:55)  T(F): 97.7 (13 Dec 2019 09:32), Max: 98.6 (13 Dec 2019 06:55)  HR: 70 (13 Dec 2019 09:35) (66 - 77)  BP: 105/57 (13 Dec 2019 09:35) (97/60 - 161/99)  RR: 18 (13 Dec 2019 09:32) (17 - 19)  SpO2: 94% (13 Dec 2019 09:32) (94% - 100%)    Daily Weight in k.9 (13 Dec 2019 05:00)    PHYSICAL EXAM:  General: WN/WD NAD  Eyes: No jaundice  ENT: MMM  Respiratory: CTA B/L  CV: RRR, no murmurs  Abdominal: Soft, NT, ND +BS  Musculoskeletal/Extremities: full range of motion X 4, no edema  Neurology: A&Ox3, no focal deficits   Skin: No rash, no petechia  Catheters: Left forearm AVF intact, thrill+                          7.5    14.14 )-----------( 72       ( 13 Dec 2019 09:05 )             21.8     Reticulocyte Percent: 7.8 % ( @ 04:53)    Hematocrit: 21.8 % ( @ 09:05)  Hematocrit: 22.7 % ( @ 06:00)  Hematocrit: 24.3 % ( @ 11:17)  Hematocrit: 23.6 % ( @ 06:30)  Hematocrit: 23.4 % ( @ 06:27)  Hematocrit: 24.4 % (12-10 @ 13:10)        138  |  95<L>  |  23  ----------------------------<  102<H>  3.2<L>   |  29  |  4.35<H>    Ca    8.4      13 Dec 2019 04:53  Phos  3.8       Mg     1.9         TPro  5.9<L>  /  Alb  3.3  /  TBili  1.3<H>  /  DBili  x   /  AST  35  /  ALT  14  /  AlkPhos  70      Lactate Dehydrogenase, Serum: 300 U/L ( @ 04:53)  Lactate Dehydrogenase, Serum: 589 U/L ( @ 06:30)  Lactate Dehydrogenase, Serum: 517 U/L ( @ 06:24)    Haptoglobin, Serum: 52 mg/dL ( @ 08:09)  Haptoglobin, Serum: <20 mg/dL ( @ 14:22)    PT/INR - ( 12 Dec 2019 12:41 )   PT: 11.4 sec;   INR: 0.99 ratio    PTT - ( 12 Dec 2019 12:41 )  PTT:27.0 sec

## 2019-12-13 NOTE — PROGRESS NOTE ADULT - ASSESSMENT
48 year old male with PMH ESRD on HD T/Th/Sat via L arm fistula since Aug 2013 (at [facility],) s/p DDRT on 12/8/2019 complicated by DGF and now concern for possible TMA.    Recipient info:   CPRA:    o%  ABO:      A+  CMVAb:  Positive  EBVIgG Status:  Positive  Last HD:  12/7/2019    Donor ID:  [id number]  Match: [id number]  OPO: NYRT  Age:  49  ABO:  A  KDPI 81%  COD:  Anoxia  X Clamp Time:  12/7/2019 1538.  Medical Hx: DM, HTN, HLD, obesity BMI 53, CAD, CABAGX3  Terminal Cr:  1/1.8/1.7  CMV- Neg EBV- Neg

## 2019-12-13 NOTE — PROGRESS NOTE ADULT - ASSESSMENT
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 Leigh, followed by Dr. Rapahel, [facility] Oncologist), RA with hand contractures, presents for potential DDRT was admitted for elective kidney transplant. Hematology was consulted for elevated LHD and new thrombocytopenia.      # TMA   Smear on 12/12 shows average >3 schistocytes/HPF. LDH was elevated and hpt was low initially. Hgb is chronically low likely 2/2 ESRD. Plt was in 200s on admission and now 40s. Unable to assess kidney function due to ESRD and recent transplant. TTP cannot completely ruled out although it is less likely clinically. Pt was on tacrolimus, which could cause drug induced TMA. Tacro was discontinued on 12/12. s/p PLEX on 12/12. Hpt was normal on 12/13.   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 Leigh, followed by Dr. Raphael, [facility] Oncologist), RA with hand contractures, presents for potential DDRT was admitted for elective kidney transplant. Hematology was consulted for elevated LHD and new thrombocytopenia.      # TMA   Smear on 12/12 shows average >3 schistocytes/HPF. LDH was elevated and hpt was low initially. Hgb is chronically low likely 2/2 ESRD. Plt was in 200s on admission and now 40s. Unable to assess kidney function due to ESRD and recent transplant. TTP cannot completely ruled out although it is less likely clinically. Pt was on tacrolimus, which could cause drug induced TMA. Tacro was discontinued on 12/12. s/p PLEX on 12/12. Hpt was normal on 12/13.   -Daily CBC with diff, CMP, Hapto, LDH, retic daily  -F/U BÁRBARA TS 13  -Continue Prednisone 1mg/kg daily until BÁRBARA TS 13 comes back  -F/U kidney Bx result done on 12/13    The case was discussed with Dr. Ben Baird MD, MPH  Hematology/Oncology Fellow  Pager: [phone]

## 2019-12-13 NOTE — CONSULT NOTE ADULT - ASSESSMENT
49 year old M with POD#4 DDRT with DGF, now with declining platelet count, with 240 on 12/8 and 47 on 12/12. +schistocytes, LDH in 500s Hapto <20, T.bili normal, Hgb trend since 12/8 was 8.5->5.5-> PRBCs TX-> 8.2->7.5 on 12/12. Patient with elevated S. cr has been on HD using AVF. Patient has no symptoms, and Coags WNL. Scheduled to undergo renal bx on 12/13.  Patient on Tacro. [id number] was sent, complement pending and aHUS panel was sent out.  Discussed with both transplant nephro and heme that patient presentation likely 2/2 Transplant TMA/DITMA and will benefit from eculizumab and wean off Tacro. After discussion the decision was made to start trial of PLEX as a bridge to soliris.   Consent was obtained from the patient for PLEX for TMA. [facility] nurse was notified with orders to exchange with 3600ml of FFP.   PLEX on 12/12 was well tolerated.    Addendum: Today pt has hematuria awaiting renal bx after platelet Tx and DDAVP.  Seen in his room today on 12/13 with transplant team on rounds. As per request of transplant team, will hold off PLEX.    Please call blood bank for future procedures.

## 2019-12-13 NOTE — PROGRESS NOTE ADULT - SUBJECTIVE AND OBJECTIVE BOX
S/p allograft bx today, denies pain    Vital Signs Last 24 Hrs  T(C): 36.3 (12-13-19 @ 17:00), Max: 37 (12-13-19 @ 06:55)  T(F): 97.3 (12-13-19 @ 17:00), Max: 98.6 (12-13-19 @ 06:55)  HR: 70 (12-13-19 @ 17:00) (63 - 75)  BP: 126/74 (12-13-19 @ 17:00) (97/60 - 159/90)  BP(mean): 101 (12-13-19 @ 16:00) (101 - 101)  RR: 18 (12-13-19 @ 17:00) (16 - 18)  SpO2: 97% (12-13-19 @ 17:00) (94% - 99%)    Card S1S2  Lungs b/l air entry  Abd soft  Extr no edema, AVF patent                                7.5    14.14 )-----------( 72       ( 13 Dec 2019 09:05 )             21.8     13 Dec 2019 04:53    138    |  95     |  23     ----------------------------<  102    3.2     |  29     |  4.35     Ca    8.4        13 Dec 2019 04:53  Phos  3.8       13 Dec 2019 04:53  Mg     1.9       13 Dec 2019 04:53    TPro  5.9    /  Alb  3.3    /  TBili  1.3    /  DBili  x      /  AST  35     /  ALT  14     /  AlkPhos  70     13 Dec 2019 04:53    LIVER FUNCTIONS - ( 13 Dec 2019 04:53 )  Alb: 3.3 g/dL / Pro: 5.9 g/dL / ALK PHOS: 70 U/L / ALT: 14 U/L / AST: 35 U/L / GGT: x           PT/INR - ( 12 Dec 2019 12:41 )   PT: 11.4 sec;   INR: 0.99 ratio      acetaminophen   Tablet .. 650 milliGRAM(s) Oral every 6 hours PRN  allopurinol 100 milliGRAM(s) Oral daily  belatacept IVPB 650 milliGRAM(s) IV Intermittent once  brimonidine 0.2% Ophthalmic Solution 1 Drop(s) Left EYE three times a day  chlorhexidine 4% Liquid 1 Application(s) Topical <User Schedule>  dextrose 40% Gel 15 Gram(s) Oral once PRN  dextrose 5%. 1000 milliLiter(s) IV Continuous <Continuous>  dextrose 50% Injectable 12.5 Gram(s) IV Push once  dextrose 50% Injectable 25 Gram(s) IV Push once  dextrose 50% Injectable 25 Gram(s) IV Push once  dorzolamide 2% Ophthalmic Solution 1 Drop(s) Left EYE three times a day  famotidine    Tablet 20 milliGRAM(s) Oral daily  gabapentin 100 milliGRAM(s) Oral two times a day  glucagon  Injectable 1 milliGRAM(s) IntraMuscular once PRN  hydrALAZINE 50 milliGRAM(s) Oral two times a day  insulin lispro (HumaLOG) corrective regimen sliding scale   SubCutaneous three times a day before meals  latanoprost 0.005% Ophthalmic Solution 1 Drop(s) Left EYE at bedtime  lidocaine 2% Jelly 10 milliLiter(s) IntraUrethral three times a day PRN  meningococcal Group B (BEXSERO) Vaccine 0.5 milliLiter(s) IntraMuscular once  meningococcal/diphtheria (MENACTRA) Vaccine 0.5 milliLiter(s) IntraMuscular once  metoprolol tartrate 25 milliGRAM(s) Oral two times a day  mycophenolate mofetil 1 Gram(s) Oral every 12 hours  nystatin    Suspension 323252 Unit(s) Swish and Swallow four times a day  ondansetron Injectable 4 milliGRAM(s) IV Push every 6 hours PRN  predniSONE   Tablet 60 milliGRAM(s) Oral daily  sodium chloride 0.9% lock flush 3 milliLiter(s) IV Push every 8 hours  traMADol 25 milliGRAM(s) Oral every 4 hours PRN  traMADol 50 milliGRAM(s) Oral every 6 hours PRN  trimethoprim   80 mG/sulfamethoxazole 400 mG 1 Tablet(s) Oral daily  valGANciclovir 450 milliGRAM(s) Oral <User Schedule>    A/P:    ESRD on HD  S/p DDRT on 12/8/2019  DGF  S/p transplant kidney bx today  Immunosuppresion/transplant management per transplant team  As pt is oligo-anuric, will continue HD 3 x week  Next HD tomorrow  Heparin free  Heme/Onc f/u noted  Will follow

## 2019-12-13 NOTE — PROGRESS NOTE ADULT - SUBJECTIVE AND OBJECTIVE BOX
[facility] DIVISION OF KIDNEY DISEASES AND HYPERTENSION -- FOLLOW UP NOTE  --------------------------------------------------------------------------------  Chief Complaint: s/p DDRT, possible TMA    24 hour events/subjective: Patient evaluated at bedside, in no acute distress. PS showed multiple shistocytes, patient was started on plasmapheresis yesterday. Planned for biopsy today. Patient to receive Belatacept pending biopsy results and possible Eculizimab.    PAST HISTORY  --------------------------------------------------------------------------------  No significant changes to PMH, PSH, FHx, SHx, unless otherwise noted    ALLERGIES & MEDICATIONS  --------------------------------------------------------------------------------  Allergies    coconut (Anaphylaxis)  morphine (Pruritus; Rash)    Intolerances    Standing Inpatient Medications  allopurinol 100 milliGRAM(s) Oral daily  brimonidine 0.2% Ophthalmic Solution 1 Drop(s) Left EYE three times a day  chlorhexidine 4% Liquid 1 Application(s) Topical <User Schedule>  desmopressin IVPB 19 MICROGram(s) IV Intermittent once  dextrose 5%. 1000 milliLiter(s) IV Continuous <Continuous>  dextrose 50% Injectable 12.5 Gram(s) IV Push once  dextrose 50% Injectable 25 Gram(s) IV Push once  dextrose 50% Injectable 25 Gram(s) IV Push once  dorzolamide 2% Ophthalmic Solution 1 Drop(s) Left EYE three times a day  famotidine    Tablet 20 milliGRAM(s) Oral daily  gabapentin 100 milliGRAM(s) Oral two times a day  hydrALAZINE 50 milliGRAM(s) Oral two times a day  insulin lispro (HumaLOG) corrective regimen sliding scale   SubCutaneous three times a day before meals  latanoprost 0.005% Ophthalmic Solution 1 Drop(s) Left EYE at bedtime  meningococcal Group B (BEXSERO) Vaccine 0.5 milliLiter(s) IntraMuscular once  meningococcal/diphtheria (MENACTRA) Vaccine 0.5 milliLiter(s) IntraMuscular once  metoprolol tartrate 25 milliGRAM(s) Oral two times a day  mycophenolate mofetil 1 Gram(s) Oral every 12 hours  nystatin    Suspension [id number] Unit(s) Swish and Swallow four times a day  predniSONE   Tablet 60 milliGRAM(s) Oral daily  sodium chloride 0.9% lock flush 3 milliLiter(s) IV Push every 8 hours  trimethoprim   80 mG/sulfamethoxazole 400 mG 1 Tablet(s) Oral daily  valGANciclovir 450 milliGRAM(s) Oral <User Schedule>    REVIEW OF SYSTEMS  --------------------------------------------------------------------------------  Gen: No fatigue, fevers/chills, weakness  Skin: No rashes  Head/Eyes/Ears/Mouth: No headache;No sore throat  Respiratory: No dyspnea, cough,   CV: No chest pain, PND, orthopnea  GI: No abdominal pain, diarrhea, constipation, nausea, vomiting  Transplant: No pain  : No increased frequency, dysuria, hematuria, nocturia  MSK: No joint pain/swelling; no back pain; no edema  Neuro: No dizziness/lightheadedness, weakness, seizures, numbness, tingling  Psych: No significant nervousness, anxiety, stress, depression    All other systems were reviewed and are negative, except as noted.    VITALS/PHYSICAL EXAM  --------------------------------------------------------------------------------  T(C): 36.5 (12-13-19 @ 09:32), Max: 37 (12-13-19 @ 06:55)  HR: 70 (12-13-19 @ 09:35) (66 - 77)  BP: 105/57 (12-13-19 @ 09:35) (97/60 - 161/99)  RR: 18 (12-13-19 @ 09:32) (17 - 19)  SpO2: 94% (12-13-19 @ 09:32) (94% - 100%)  Wt(kg): --    12-12-19 @ 07:01  -  12-13-19 @ 07:00  --------------------------------------------------------  IN: 830 mL / OUT: 2055 mL / NET: -1225 mL    Physical Exam:  	Gen: NAD, well-appearing  	HEENT: PERRL, supple neck, clear oropharynx  	Pulm: CTA B/L  	CV: RRR, S1S2; no rub  	Back: No spinal or CVA tenderness; no sacral edema  	Abd: +BS, soft, nontender/nondistended              Transplant: No tenderness, swelling  	: No suprapubic tenderness  	UE: Warm, FROM, intact strength; no edema; no asterixis  	LE: Warm, FROM, intact strength; no edema  	Neuro: No focal deficits, intact gait  	Psych: Normal affect and mood  	Skin: Warm, without rashes    LABS/STUDIES  --------------------------------------------------------------------------------              7.5    14.14 >-----------<  72       [12-13-19 @ 09:05]              21.8     138  |  95  |  23  ----------------------------<  102      [12-13-19 @ 04:53]  3.2   |  29  |  4.35        Ca     8.4     [12-13-19 @ 04:53]      Mg     1.9     [12-13-19 @ 04:53]      Phos  3.8     [12-13-19 @ 04:53]    TPro  5.9  /  Alb  3.3  /  TBili  1.3  /  DBili  x   /  AST  35  /  ALT  14  /  AlkPhos  70  [12-13-19 @ 04:53]    PT/INR: PT 11.4 , INR 0.99       [12-12-19 @ 12:41]  PTT: 27.0       [12-12-19 @ 12:41]          [12-13-19 @ 04:53]    Creatinine Trend:  SCr 4.35 [12-13 @ 04:53]  SCr 6.38 [12-12 @ 06:30]  SCr 4.92 [12-11 @ 06:24]  SCr 7.29 [12-10 @ 11:48]  SCr 7.21 [12-10 @ 06:37]    Tacrolimus (), Serum: 5.2 ng/mL (12-13 @ 05:33)  Tacrolimus (), Serum: 4.5 ng/mL (12-12 @ 08:19)  Tacrolimus (), Serum: 3.7 ng/mL (12-11 @ 07:10)  Tacrolimus (), Serum: 2.4 ng/mL (12-10 @ 07:18)

## 2019-12-13 NOTE — PROGRESS NOTE ADULT - SUBJECTIVE AND OBJECTIVE BOX
Transplant Surgery - Multidisciplinary Rounds  --------------------------------------------------------------  DDRT POD #3    Present:  Patient seen with multidisciplinary team icluding Transplant Surgeon: Dr. Espinal, Dr. oJy, Transplant Nephrologist: Dr. Lowery, Dr. Puckett, Pharmacist: Job Diaz.   NP Aislinn Hunt, MAEVE Montano, Surgical resident Everton Walker, PGY3, and examined by Dr. Joy. Disciplines not in attendance will be notified of the plan.      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 Young, followed by Dr. Raphael, [facility] Oncologist), RA with hand contractures, now s/p DDRT on 12/8/19 POD#1.    Last colonoscopy: clear 2017  Onc: cleared by [facility] 2019 (low grade NET, risk of HD greater than recurrence of low grade NET, recent PET/MRI neg)    Reccipient info:   CPRA:    o%  ABO:      A+  CMVAb:  Positive  EBVIgG Status:  Positive  Last HD:  12/7/2019    Donor ID:  [id number]  Match: [id number]  OPO: NYRT  Age:  49  ABO:  A  KDPI 81%  COD:  Anoxia  X Clamp Time:  12/7/2019 1538.  Medical Hx: DM, HTN, HLD, obesity BMI 53, CAD, CABAGX3  Terminal Cr:  1/1.8/1.7  CMV- Neg EBV- Neg    OR:    DDRT to right iliac fossa. Simulect induction. Two arteries anastomosed to a single cuff on the back table. One vein, one ureter. Ureteral anastomosis performed over a double J stent, which was sutured to the martinez. Cold ischemia time 25.5 hours.    Interval Events:  - LDH increasing, PLT dropping, DGF requiring HD all concerning for possible TMA  - + Schistocytes seen on peripheral smear, Haptoglobin <20   - ADAMTS 13, DSA, TMA complement panel sent  - Envarsus held  - PLEX #1 done  - HD -2L, UOP 35ml.  SCr 4.35, K+ 3.2  - ultrasound done   - platelets 42 this am. 1 Unit platelets given in prep for renal Tx biopsy today.  Ordered for DDAVP in advance of procedure  - reports liquid stool this morning    Potential Discharge date: Pending clinical evaluation    Education: Medications and plan of care    Plan of care: See below    MEDICATIONS  (STANDING):  allopurinol 100 milliGRAM(s) Oral daily  brimonidine 0.2% Ophthalmic Solution 1 Drop(s) Left EYE three times a day  chlorhexidine 4% Liquid 1 Application(s) Topical <User Schedule>  desmopressin IVPB 19 MICROGram(s) IV Intermittent once  dextrose 5%. 1000 milliLiter(s) (50 mL/Hr) IV Continuous <Continuous>  dextrose 50% Injectable 12.5 Gram(s) IV Push once  dextrose 50% Injectable 25 Gram(s) IV Push once  dextrose 50% Injectable 25 Gram(s) IV Push once  dorzolamide 2% Ophthalmic Solution 1 Drop(s) Left EYE three times a day  famotidine    Tablet 20 milliGRAM(s) Oral daily  gabapentin 100 milliGRAM(s) Oral two times a day  hydrALAZINE 50 milliGRAM(s) Oral two times a day  insulin lispro (HumaLOG) corrective regimen sliding scale   SubCutaneous three times a day before meals  latanoprost 0.005% Ophthalmic Solution 1 Drop(s) Left EYE at bedtime  meningococcal Group B (BEXSERO) Vaccine 0.5 milliLiter(s) IntraMuscular once  meningococcal/diphtheria (MENACTRA) Vaccine 0.5 milliLiter(s) IntraMuscular once  metoprolol tartrate 25 milliGRAM(s) Oral two times a day  mycophenolate mofetil 1 Gram(s) Oral every 12 hours  nystatin    Suspension 346273 Unit(s) Swish and Swallow four times a day  predniSONE   Tablet 60 milliGRAM(s) Oral daily  sodium chloride 0.9% lock flush 3 milliLiter(s) IV Push every 8 hours  trimethoprim   80 mG/sulfamethoxazole 400 mG 1 Tablet(s) Oral daily  valGANciclovir 450 milliGRAM(s) Oral <User Schedule>    MEDICATIONS  (PRN):  acetaminophen   Tablet .. 650 milliGRAM(s) Oral every 6 hours PRN Mild Pain (1 - 3)  dextrose 40% Gel 15 Gram(s) Oral once PRN Blood Glucose LESS THAN 70 milliGRAM(s)/deciliter  glucagon  Injectable 1 milliGRAM(s) IntraMuscular once PRN Glucose LESS THAN 70 milligrams/deciliter  lidocaine 2% Jelly 10 milliLiter(s) IntraUrethral three times a day PRN Ureteral irritation  ondansetron Injectable 4 milliGRAM(s) IV Push every 6 hours PRN Nausea and/or Vomiting  traMADol 25 milliGRAM(s) Oral every 4 hours PRN Moderate Pain (4 - 6)  traMADol 50 milliGRAM(s) Oral every 6 hours PRN Severe Pain (7 - 10)      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  Carpal tunnel syndrome  Brain cyst: had MRI recently- now gone  Anemia  Gastric ulcer: Long time ago  Rheumatoid arthritis  Breakdown (mechanical) of penile (implanted) prosthesis, sequela  End-stage renal disease (ESRD): PERMACATH PLACEMENT  Abscess of left buttock: s/p I &amp; D  AV fistula: placement left lower arm  S/P cystoscopy: stent placement &amp; removal for kidney stones, lithiotripsy  s/p Lt Carpal tunnel: 2011      Vital Signs Last 24 Hrs  T(C): 36.5 (13 Dec 2019 09:32), Max: 37 (13 Dec 2019 06:55)  T(F): 97.7 (13 Dec 2019 09:32), Max: 98.6 (13 Dec 2019 06:55)  HR: 70 (13 Dec 2019 09:35) (66 - 77)  BP: 105/57 (13 Dec 2019 09:35) (97/60 - 161/99)  BP(mean): --  RR: 18 (13 Dec 2019 09:32) (17 - 19)  SpO2: 94% (13 Dec 2019 09:32) (94% - 100%)    I&O's Summary    12 Dec 2019 07:01  -  13 Dec 2019 07:00  --------------------------------------------------------  IN: 830 mL / OUT: 2055 mL / NET: -1225 mL                              7.5    14.14 )-----------( 72       ( 13 Dec 2019 09:05 )             21.8     12-13    138  |  95<L>  |  23  ----------------------------<  102<H>  3.2<L>   |  29  |  4.35<H>    Ca    8.4      13 Dec 2019 04:53  Phos  3.8     12-13  Mg     1.9     12-13    TPro  5.9<L>  /  Alb  3.3  /  TBili  1.3<H>  /  DBili  x   /  AST  35  /  ALT  14  /  AlkPhos  70  12-13    Tacrolimus (), Serum: 5.2 ng/mL (12-13 @ 05:33)        Culture - Nose (collected 12-10-19 @ 17:21)  Source: .Nose Nose  Preliminary Report (12-11-19 @ 15:43):    Culture in progress      REVIEW OF SYSTEMS  --------------------------------------------------------------------------------  Gen: No weight changes, fatigue, fevers/chills, + weakness  Skin: No rashes  Head/Eyes/Ears/Mouth: No headache; Normal hearing; Normal vision w/o blurriness; No sinus pain/discomfort, sore throat  Respiratory: No dyspnea, cough, wheezing, hemoptysis  CV: No chest pain, PND, orthopnea  GI: No abdominal pain, constipation, nausea, vomiting, melena, hematochezia. + Diarrhea overnight  : c/o martinez pain. No increased frequency, dysuria, hematuria, nocturia  MSK: No joint pain/swelling; no back pain; no edema  Neuro: No dizziness/lightheadedness, weakness, seizures, numbness, tingling  Heme: No easy bruising or bleeding  Endo: No heat/cold intolerance  Psych: No significant nervousness, anxiety, stress, depression  All other systems were reviewed and are negative, except as noted.      PHYSICAL EXAM:  Constitutional: Well developed / well nourished  Eyes: Anicteric, PERRLA  ENMT: nc/at  Neck: Supple  Respiratory: CTA B/L  Cardiovascular: RRR  Gastrointestinal: Soft abdomen, NT, ND  Genitourinary:  Martinez in place  Extremities: SCD's in place and working bilaterally  Vascular: Palpable dp pulses bilaterally  Neurological: A&O x3  Skin: Wound dressing changed with dry ASIF jackson  Musculoskeletal: Moving all extremities  Psychiatric: Responsive

## 2019-12-13 NOTE — PROGRESS NOTE ADULT - SUBJECTIVE AND OBJECTIVE BOX
Hematology/Oncology Follow-up    INTERVAL HPI/OVERNIGHT EVENTS:  had PLEX last night.     VITAL SIGNS:  T(F): 97.7 (12-13-19 @ 09:32)  HR: 70 (12-13-19 @ 09:35)  BP: 105/57 (12-13-19 @ 09:35)  RR: 18 (12-13-19 @ 09:32)  SpO2: 94% (12-13-19 @ 09:32)  Wt(kg): --    12-12-19 @ 07:01  -  12-13-19 @ 07:00  --------------------------------------------------------  IN: 830 mL / OUT: 2055 mL / NET: -1225 mL        PHYSICAL EXAM:  Constitutional: NAD   Eyes: sclera non-icteric, conjunctiva-anemia  Neck: supple  Mouth: No mucositis, no exudate, no ulcer.   Respiratory: CTA b/l  Cardiovascular: Normal rate regular rhythm. no murmur  Gastrointestinal: soft and flat, no tenderness to palpation, no masses palpable, normoactive bowel sound, no HSM  Extremities:  No c/c/e  MSK: No joint swelling, erythema, or tenderness   Neurological: AOx3, Cranial nerves II-XII grossly intact  Skin: No rash  Psych: Normal affect      MEDICATIONS  (STANDING):  allopurinol 100 milliGRAM(s) Oral daily  brimonidine 0.2% Ophthalmic Solution 1 Drop(s) Left EYE three times a day  chlorhexidine 4% Liquid 1 Application(s) Topical <User Schedule>  dextrose 5%. 1000 milliLiter(s) (50 mL/Hr) IV Continuous <Continuous>  dextrose 50% Injectable 12.5 Gram(s) IV Push once  dextrose 50% Injectable 25 Gram(s) IV Push once  dextrose 50% Injectable 25 Gram(s) IV Push once  dorzolamide 2% Ophthalmic Solution 1 Drop(s) Left EYE three times a day  famotidine    Tablet 20 milliGRAM(s) Oral daily  gabapentin 100 milliGRAM(s) Oral two times a day  hydrALAZINE 50 milliGRAM(s) Oral two times a day  insulin lispro (HumaLOG) corrective regimen sliding scale   SubCutaneous three times a day before meals  latanoprost 0.005% Ophthalmic Solution 1 Drop(s) Left EYE at bedtime  meningococcal Group B (BEXSERO) Vaccine 0.5 milliLiter(s) IntraMuscular once  meningococcal/diphtheria (MENACTRA) Vaccine 0.5 milliLiter(s) IntraMuscular once  metoprolol tartrate 25 milliGRAM(s) Oral two times a day  mycophenolate mofetil 1 Gram(s) Oral every 12 hours  nystatin    Suspension [id number] Unit(s) Swish and Swallow four times a day  predniSONE   Tablet 60 milliGRAM(s) Oral daily  sodium chloride 0.9% lock flush 3 milliLiter(s) IV Push every 8 hours  trimethoprim   80 mG/sulfamethoxazole 400 mG 1 Tablet(s) Oral daily  valGANciclovir 450 milliGRAM(s) Oral <User Schedule>    MEDICATIONS  (PRN):  acetaminophen   Tablet .. 650 milliGRAM(s) Oral every 6 hours PRN Mild Pain (1 - 3)  dextrose 40% Gel 15 Gram(s) Oral once PRN Blood Glucose LESS THAN 70 milliGRAM(s)/deciliter  glucagon  Injectable 1 milliGRAM(s) IntraMuscular once PRN Glucose LESS THAN 70 milligrams/deciliter  lidocaine 2% Jelly 10 milliLiter(s) IntraUrethral three times a day PRN Ureteral irritation  ondansetron Injectable 4 milliGRAM(s) IV Push every 6 hours PRN Nausea and/or Vomiting  traMADol 25 milliGRAM(s) Oral every 4 hours PRN Moderate Pain (4 - 6)  traMADol 50 milliGRAM(s) Oral every 6 hours PRN Severe Pain (7 - 10)      coconut (Anaphylaxis)  morphine (Pruritus; Rash)      LABS:                        7.5    14.14 )-----------( 72       ( 13 Dec 2019 09:05 )             21.8     12-13    138  |  95<L>  |  23  ----------------------------<  102<H>  3.2<L>   |  29  |  4.35<H>    Ca    8.4      13 Dec 2019 04:53  Phos  3.8     12-13  Mg     1.9     12-13    TPro  5.9<L>  /  Alb  3.3  /  TBili  1.3<H>  /  DBili  x   /  AST  35  /  ALT  14  /  AlkPhos  70  12-13    PT/INR - ( 12 Dec 2019 12:41 )   PT: 11.4 sec;   INR: 0.99 ratio         PTT - ( 12 Dec 2019 12:41 )  PTT:27.0 sec Haptoglobin, Serum: 52 mg/dL (12-13 @ 08:09)  Lactate Dehydrogenase, Serum: 300 U/L (12-13 @ 04:53)        RADIOLOGY & ADDITIONAL TESTS:  Studies reviewed. Hematology/Oncology Follow-up    INTERVAL HPI/OVERNIGHT EVENTS:  had PLEX last night. Feels ok today. Hungry    VITAL SIGNS:  T(F): 97.7 (12-13-19 @ 09:32)  HR: 70 (12-13-19 @ 09:35)  BP: 105/57 (12-13-19 @ 09:35)  RR: 18 (12-13-19 @ 09:32)  SpO2: 94% (12-13-19 @ 09:32)  Wt(kg): --    12-12-19 @ 07:01  -  12-13-19 @ 07:00  --------------------------------------------------------  IN: 830 mL / OUT: 2055 mL / NET: -1225 mL        PHYSICAL EXAM:  Constitutional: NAD   Eyes: sclera non-icteric, conjunctiva-anemia  Neck: supple  Mouth: No mucositis, no exudate, no ulcer.   Respiratory: CTA b/l  Cardiovascular: Normal rate regular rhythm. no murmur  Gastrointestinal: soft and flat, no tenderness to palpation, no masses palpable, normoactive bowel sound, no HSM  Extremities:  No c/c/e  MSK: No joint swelling, erythema, or tenderness   Neurological: AOx3, Cranial nerves II-XII grossly intact  Skin: No rash  Psych: Normal affect      MEDICATIONS  (STANDING):  allopurinol 100 milliGRAM(s) Oral daily  brimonidine 0.2% Ophthalmic Solution 1 Drop(s) Left EYE three times a day  chlorhexidine 4% Liquid 1 Application(s) Topical <User Schedule>  dextrose 5%. 1000 milliLiter(s) (50 mL/Hr) IV Continuous <Continuous>  dextrose 50% Injectable 12.5 Gram(s) IV Push once  dextrose 50% Injectable 25 Gram(s) IV Push once  dextrose 50% Injectable 25 Gram(s) IV Push once  dorzolamide 2% Ophthalmic Solution 1 Drop(s) Left EYE three times a day  famotidine    Tablet 20 milliGRAM(s) Oral daily  gabapentin 100 milliGRAM(s) Oral two times a day  hydrALAZINE 50 milliGRAM(s) Oral two times a day  insulin lispro (HumaLOG) corrective regimen sliding scale   SubCutaneous three times a day before meals  latanoprost 0.005% Ophthalmic Solution 1 Drop(s) Left EYE at bedtime  meningococcal Group B (BEXSERO) Vaccine 0.5 milliLiter(s) IntraMuscular once  meningococcal/diphtheria (MENACTRA) Vaccine 0.5 milliLiter(s) IntraMuscular once  metoprolol tartrate 25 milliGRAM(s) Oral two times a day  mycophenolate mofetil 1 Gram(s) Oral every 12 hours  nystatin    Suspension [id number] Unit(s) Swish and Swallow four times a day  predniSONE   Tablet 60 milliGRAM(s) Oral daily  sodium chloride 0.9% lock flush 3 milliLiter(s) IV Push every 8 hours  trimethoprim   80 mG/sulfamethoxazole 400 mG 1 Tablet(s) Oral daily  valGANciclovir 450 milliGRAM(s) Oral <User Schedule>    MEDICATIONS  (PRN):  acetaminophen   Tablet .. 650 milliGRAM(s) Oral every 6 hours PRN Mild Pain (1 - 3)  dextrose 40% Gel 15 Gram(s) Oral once PRN Blood Glucose LESS THAN 70 milliGRAM(s)/deciliter  glucagon  Injectable 1 milliGRAM(s) IntraMuscular once PRN Glucose LESS THAN 70 milligrams/deciliter  lidocaine 2% Jelly 10 milliLiter(s) IntraUrethral three times a day PRN Ureteral irritation  ondansetron Injectable 4 milliGRAM(s) IV Push every 6 hours PRN Nausea and/or Vomiting  traMADol 25 milliGRAM(s) Oral every 4 hours PRN Moderate Pain (4 - 6)  traMADol 50 milliGRAM(s) Oral every 6 hours PRN Severe Pain (7 - 10)      coconut (Anaphylaxis)  morphine (Pruritus; Rash)      LABS:                        7.5    14.14 )-----------( 72       ( 13 Dec 2019 09:05 )             21.8     12-13    138  |  95<L>  |  23  ----------------------------<  102<H>  3.2<L>   |  29  |  4.35<H>    Ca    8.4      13 Dec 2019 04:53  Phos  3.8     12-13  Mg     1.9     12-13    TPro  5.9<L>  /  Alb  3.3  /  TBili  1.3<H>  /  DBili  x   /  AST  35  /  ALT  14  /  AlkPhos  70  12-13    PT/INR - ( 12 Dec 2019 12:41 )   PT: 11.4 sec;   INR: 0.99 ratio         PTT - ( 12 Dec 2019 12:41 )  PTT:27.0 sec Haptoglobin, Serum: 52 mg/dL (12-13 @ 08:09)  Lactate Dehydrogenase, Serum: 300 U/L (12-13 @ 04:53)        RADIOLOGY & ADDITIONAL TESTS:  Studies reviewed.

## 2019-12-13 NOTE — CHART NOTE - NSCHARTNOTEFT_GEN_A_CORE
Nutrition Follow Up Note    Patient seen for: nutrition follow up S/P DDRT .     Source: medical record. Pt off unit, in PACU, S/P renal biopsy. RD has made multiple attempts to visit patient for post-transplant nutrition education. Pt refused education on first interview, off unit for procedures on 3 repeated visits this week. RD will reattempt interview and education when appropriate.    Chart reviewed, events noted. "49M with significant PMHx of ESRD on HD; s/p DDRT on 19 complicated by DGF requiring HD now with elevated LDH, thrombocytopenia and + schistocytes on smear concerning for TMA."    Diet : Renal     PO intake : Unable to assess. RD will follow up when pt returns to unit.     Last BM: ,     Last HD: 12/10 (-2L),  (-2L)    Urine output: 60ml; DGF noted    Daily Weight in k.9 (12-13), Weight in k.3 (12-12), Weight in k.3 (12-12), Weight in k.3 (12-12), Weight in k.7 (12-11), Weight in k (12-10), Weight in k (12-10). Weight changes noted in setting of DGF and HD.    Drug Dosing Weight  Weight (kg): 64.1 (08 Dec 2019 12:31)  BMI (kg/m2): 19.7 (08 Dec 2019 12:31)    Pertinent Medications: MEDICATIONS  (STANDING):  allopurinol 100 milliGRAM(s) Oral daily  brimonidine 0.2% Ophthalmic Solution 1 Drop(s) Left EYE three times a day  chlorhexidine 4% Liquid 1 Application(s) Topical <User Schedule>  dextrose 5%. 1000 milliLiter(s) (50 mL/Hr) IV Continuous <Continuous>  dextrose 50% Injectable 12.5 Gram(s) IV Push once  dextrose 50% Injectable 25 Gram(s) IV Push once  dextrose 50% Injectable 25 Gram(s) IV Push once  dorzolamide 2% Ophthalmic Solution 1 Drop(s) Left EYE three times a day  famotidine    Tablet 20 milliGRAM(s) Oral daily  gabapentin 100 milliGRAM(s) Oral two times a day  hydrALAZINE 50 milliGRAM(s) Oral two times a day  insulin lispro (HumaLOG) corrective regimen sliding scale   SubCutaneous three times a day before meals  latanoprost 0.005% Ophthalmic Solution 1 Drop(s) Left EYE at bedtime  meningococcal Group B (BEXSERO) Vaccine 0.5 milliLiter(s) IntraMuscular once  meningococcal/diphtheria (MENACTRA) Vaccine 0.5 milliLiter(s) IntraMuscular once  metoprolol tartrate 25 milliGRAM(s) Oral two times a day  mycophenolate mofetil 1 Gram(s) Oral every 12 hours  nystatin    Suspension 499446 Unit(s) Swish and Swallow four times a day  predniSONE   Tablet 60 milliGRAM(s) Oral daily  sodium chloride 0.9% lock flush 3 milliLiter(s) IV Push every 8 hours  trimethoprim   80 mG/sulfamethoxazole 400 mG 1 Tablet(s) Oral daily  valGANciclovir 450 milliGRAM(s) Oral <User Schedule>    MEDICATIONS  (PRN):  acetaminophen   Tablet .. 650 milliGRAM(s) Oral every 6 hours PRN Mild Pain (1 - 3)  dextrose 40% Gel 15 Gram(s) Oral once PRN Blood Glucose LESS THAN 70 milliGRAM(s)/deciliter  glucagon  Injectable 1 milliGRAM(s) IntraMuscular once PRN Glucose LESS THAN 70 milligrams/deciliter  lidocaine 2% Jelly 10 milliLiter(s) IntraUrethral three times a day PRN Ureteral irritation  ondansetron Injectable 4 milliGRAM(s) IV Push every 6 hours PRN Nausea and/or Vomiting  traMADol 25 milliGRAM(s) Oral every 4 hours PRN Moderate Pain (4 - 6)  traMADol 50 milliGRAM(s) Oral every 6 hours PRN Severe Pain (7 - 10)    LABS:    @ 09:05: Hemoglobin 7.5<L>, Hematocrit 21.8<L>   @ 06:00: Hemoglobin 7.6<L>, Hematocrit 22.7<L>   @ 04:53: Sodium 138, Potassium 3.2<L>, Chloride 95<L>, Calcium 8.4, Magnesium 1.9, Phosphorus 3.8, BUN 23, Creatinine 4.35<H>, <H>, Alk Phos 70, ALT/SGPT 14, AST/SGOT 35, Total Protein 5.9<L>, Albumin 3.3, Total Bilirubin 1.3<H>,     POCT Blood Glucose.: 143 mg/dL (13 Dec 2019 09:11)  POCT Blood Glucose.: 148 mg/dL (12 Dec 2019 21:56)  POCT Blood Glucose.: 103 mg/dL (12 Dec 2019 17:33)    Skin per nursing documentation: no pressure injuries  Edema: no edema    Estimated Needs:   [X ] no change since previous assessment  [ ] recalculated:     Previous Nutrition Diagnosis: 1) Increased Nutrient Needs 2) Food & Nutrition Related Knowledge Deficit  Nutrition Diagnosis is: ongoing, being addressed with therapeutic diet; education when appropriate    New Nutrition Diagnosis: none     Interventions: Provide post-transplant nutrition therapy education when appropriate    Recommend  1) Continue Renal diet. 2) Monitor DGF, urine output, ongoing need for HD. 3) Reinforce post-transplant nutrition therapy and food safety guidelines in-house and prior to discharge, when appropriate. 4) Discharge diet: Continue as above. Recommend follow up visit with Transplant MD and outpatient RD for dietary modifications as warranted.     Monitoring and Evaluation:     Continue to monitor nutritional intake, tolerance to diet prescription, weights, labs, skin integrity.    RD remains available upon request and will follow up per protocol.    America Anglin, MS RD CDN [facility], Pager # [phone]

## 2019-12-13 NOTE — CHART NOTE - NSCHARTNOTEFT_GEN_A_CORE
-------------------------------------------------------------  Interventional Radiology Brief Operative Note  -------------------------------------------------------------    Pre-Procedure Diagnosis: transplant renal failure    Post-Procedure Diagnosis: same    Indication: increasing Cr and thrombocytopenia    Procedure: transplant renal biopsy    Operators: Sebastian MUÑOZ; Marlin MUÑOZ    Anesthesia: IV sedation was provided by anesthesiology. Local anesthesia was achieved with 2% lidocaine.    EBL: minimal    Complications: none    Imaging: ultrasound    Contrast: none    Findings: no significant perirenal hematoma following biopsy. tract embolizatioin performed with 0.3cc flowable DStat    Specimens Removed: core needle biopsy x 3    Implants: none    Condition/Disposition: stable, recovery x 2h then return to floor    Follow-Up Plan of Care: follow up biopsy results    For questions or concerns, please call Interventional Radiology at [phone] during business hours, or [phone] and page [phone] after 5PM and on weekends.    Everton Cortes MD  PGY5 Interventional Radiology Resident  (d) [phone]

## 2019-12-14 LAB
ALBUMIN SERPL ELPH-MCNC: 3.5 G/DL — SIGNIFICANT CHANGE UP (ref 3.3–5)
ALP SERPL-CCNC: 76 U/L — SIGNIFICANT CHANGE UP (ref 40–120)
ALT FLD-CCNC: 9 U/L — LOW (ref 10–45)
ANION GAP SERPL CALC-SCNC: 17 MMOL/L — SIGNIFICANT CHANGE UP (ref 5–17)
APTT BLD: 22.6 SEC — LOW (ref 27.5–36.3)
AST SERPL-CCNC: 28 U/L — SIGNIFICANT CHANGE UP (ref 10–40)
BILIRUB SERPL-MCNC: 0.8 MG/DL — SIGNIFICANT CHANGE UP (ref 0.2–1.2)
BUN SERPL-MCNC: 35 MG/DL — HIGH (ref 7–23)
CALCIUM SERPL-MCNC: 7.7 MG/DL — LOW (ref 8.4–10.5)
CHLORIDE SERPL-SCNC: 89 MMOL/L — LOW (ref 96–108)
CO2 SERPL-SCNC: 29 MMOL/L — SIGNIFICANT CHANGE UP (ref 22–31)
CREAT SERPL-MCNC: 5.7 MG/DL — HIGH (ref 0.5–1.3)
CULTURE RESULTS: SIGNIFICANT CHANGE UP
FIBRINOGEN PPP-MCNC: 409 MG/DL — SIGNIFICANT CHANGE UP (ref 350–510)
GLUCOSE BLDC GLUCOMTR-MCNC: 130 MG/DL — HIGH (ref 70–99)
GLUCOSE BLDC GLUCOMTR-MCNC: 158 MG/DL — HIGH (ref 70–99)
GLUCOSE BLDC GLUCOMTR-MCNC: 188 MG/DL — HIGH (ref 70–99)
GLUCOSE BLDC GLUCOMTR-MCNC: 213 MG/DL — HIGH (ref 70–99)
GLUCOSE SERPL-MCNC: 140 MG/DL — HIGH (ref 70–99)
HAPTOGLOB SERPL-MCNC: 72 MG/DL — SIGNIFICANT CHANGE UP (ref 34–200)
HCT VFR BLD CALC: 22.5 % — LOW (ref 39–50)
HCT VFR BLD CALC: 23.5 % — LOW (ref 39–50)
HCT VFR BLD CALC: 24.2 % — LOW (ref 39–50)
HGB BLD-MCNC: 7.7 G/DL — LOW (ref 13–17)
HGB BLD-MCNC: 7.9 G/DL — LOW (ref 13–17)
HGB BLD-MCNC: 8.1 G/DL — LOW (ref 13–17)
INR BLD: 0.97 RATIO — SIGNIFICANT CHANGE UP (ref 0.88–1.16)
LDH SERPL L TO P-CCNC: 283 U/L — HIGH (ref 50–242)
MAGNESIUM SERPL-MCNC: 2.1 MG/DL — SIGNIFICANT CHANGE UP (ref 1.6–2.6)
MCHC RBC-ENTMCNC: 32.5 PG — SIGNIFICANT CHANGE UP (ref 27–34)
MCHC RBC-ENTMCNC: 32.7 PG — SIGNIFICANT CHANGE UP (ref 27–34)
MCHC RBC-ENTMCNC: 33 PG — SIGNIFICANT CHANGE UP (ref 27–34)
MCHC RBC-ENTMCNC: 33.5 GM/DL — SIGNIFICANT CHANGE UP (ref 32–36)
MCHC RBC-ENTMCNC: 33.6 GM/DL — SIGNIFICANT CHANGE UP (ref 32–36)
MCHC RBC-ENTMCNC: 34.2 GM/DL — SIGNIFICANT CHANGE UP (ref 32–36)
MCV RBC AUTO: 96.6 FL — SIGNIFICANT CHANGE UP (ref 80–100)
MCV RBC AUTO: 96.7 FL — SIGNIFICANT CHANGE UP (ref 80–100)
MCV RBC AUTO: 97.6 FL — SIGNIFICANT CHANGE UP (ref 80–100)
NRBC # BLD: 4 /100 WBCS — HIGH (ref 0–0)
NRBC # BLD: 5 /100 WBCS — HIGH (ref 0–0)
NRBC # BLD: 5 /100 WBCS — HIGH (ref 0–0)
PHOSPHATE SERPL-MCNC: 4.6 MG/DL — HIGH (ref 2.5–4.5)
PLATELET # BLD AUTO: 150 K/UL — SIGNIFICANT CHANGE UP (ref 150–400)
PLATELET # BLD AUTO: 150 K/UL — SIGNIFICANT CHANGE UP (ref 150–400)
PLATELET # BLD AUTO: 156 K/UL — SIGNIFICANT CHANGE UP (ref 150–400)
POTASSIUM SERPL-MCNC: 3.1 MMOL/L — LOW (ref 3.5–5.3)
POTASSIUM SERPL-SCNC: 3.1 MMOL/L — LOW (ref 3.5–5.3)
PROT SERPL-MCNC: 6.1 G/DL — SIGNIFICANT CHANGE UP (ref 6–8.3)
PROTHROM AB SERPL-ACNC: 11 SEC — SIGNIFICANT CHANGE UP (ref 10–12.9)
RBC # BLD: 2.33 M/UL — LOW (ref 4.2–5.8)
RBC # BLD: 2.33 M/UL — LOW (ref 4.2–5.8)
RBC # BLD: 2.43 M/UL — LOW (ref 4.2–5.8)
RBC # BLD: 2.48 M/UL — LOW (ref 4.2–5.8)
RBC # FLD: 19.4 % — HIGH (ref 10.3–14.5)
RBC # FLD: 20.1 % — HIGH (ref 10.3–14.5)
RBC # FLD: 20.2 % — HIGH (ref 10.3–14.5)
RETICS #: 148.2 K/UL — HIGH (ref 25–125)
RETICS/RBC NFR: 6.4 % — HIGH (ref 0.5–2.5)
SODIUM SERPL-SCNC: 135 MMOL/L — SIGNIFICANT CHANGE UP (ref 135–145)
SPECIMEN SOURCE: SIGNIFICANT CHANGE UP
TACROLIMUS SERPL-MCNC: 2.4 NG/ML — SIGNIFICANT CHANGE UP
WBC # BLD: 10.95 K/UL — HIGH (ref 3.8–10.5)
WBC # BLD: 11.41 K/UL — HIGH (ref 3.8–10.5)
WBC # BLD: 12.09 K/UL — HIGH (ref 3.8–10.5)
WBC # FLD AUTO: 10.95 K/UL — HIGH (ref 3.8–10.5)
WBC # FLD AUTO: 11.41 K/UL — HIGH (ref 3.8–10.5)
WBC # FLD AUTO: 12.09 K/UL — HIGH (ref 3.8–10.5)

## 2019-12-14 PROCEDURE — 99221 1ST HOSP IP/OBS SF/LOW 40: CPT

## 2019-12-14 PROCEDURE — 99232 SBSQ HOSP IP/OBS MODERATE 35: CPT | Mod: GC

## 2019-12-14 RX ADMIN — BRIMONIDINE TARTRATE 1 DROP(S): 2 SOLUTION/ DROPS OPHTHALMIC at 05:48

## 2019-12-14 RX ADMIN — Medication 100 MILLIGRAM(S): at 12:54

## 2019-12-14 RX ADMIN — DORZOLAMIDE HYDROCHLORIDE 1 DROP(S): 20 SOLUTION/ DROPS OPHTHALMIC at 17:47

## 2019-12-14 RX ADMIN — TRAMADOL HYDROCHLORIDE 50 MILLIGRAM(S): 50 TABLET ORAL at 05:48

## 2019-12-14 RX ADMIN — Medication 25 MILLIGRAM(S): at 05:50

## 2019-12-14 RX ADMIN — GABAPENTIN 100 MILLIGRAM(S): 400 CAPSULE ORAL at 17:48

## 2019-12-14 RX ADMIN — Medication 50 MILLIGRAM(S): at 17:56

## 2019-12-14 RX ADMIN — FAMOTIDINE 20 MILLIGRAM(S): 10 INJECTION INTRAVENOUS at 12:54

## 2019-12-14 RX ADMIN — DORZOLAMIDE HYDROCHLORIDE 1 DROP(S): 20 SOLUTION/ DROPS OPHTHALMIC at 22:02

## 2019-12-14 RX ADMIN — MYCOPHENOLATE MOFETIL 1 GRAM(S): 250 CAPSULE ORAL at 17:56

## 2019-12-14 RX ADMIN — BRIMONIDINE TARTRATE 1 DROP(S): 2 SOLUTION/ DROPS OPHTHALMIC at 17:48

## 2019-12-14 RX ADMIN — Medication 500000 UNIT(S): at 12:54

## 2019-12-14 RX ADMIN — BRIMONIDINE TARTRATE 1 DROP(S): 2 SOLUTION/ DROPS OPHTHALMIC at 22:02

## 2019-12-14 RX ADMIN — SODIUM CHLORIDE 3 MILLILITER(S): 9 INJECTION INTRAMUSCULAR; INTRAVENOUS; SUBCUTANEOUS at 13:01

## 2019-12-14 RX ADMIN — TRAMADOL HYDROCHLORIDE 50 MILLIGRAM(S): 50 TABLET ORAL at 19:52

## 2019-12-14 RX ADMIN — Medication 1: at 09:06

## 2019-12-14 RX ADMIN — Medication 2: at 12:53

## 2019-12-14 RX ADMIN — MYCOPHENOLATE MOFETIL 1 GRAM(S): 250 CAPSULE ORAL at 05:49

## 2019-12-14 RX ADMIN — Medication 1 TABLET(S): at 12:54

## 2019-12-14 RX ADMIN — Medication 500000 UNIT(S): at 23:25

## 2019-12-14 RX ADMIN — CHLORHEXIDINE GLUCONATE 1 APPLICATION(S): 213 SOLUTION TOPICAL at 05:51

## 2019-12-14 RX ADMIN — LATANOPROST 1 DROP(S): 0.05 SOLUTION/ DROPS OPHTHALMIC; TOPICAL at 22:02

## 2019-12-14 RX ADMIN — DORZOLAMIDE HYDROCHLORIDE 1 DROP(S): 20 SOLUTION/ DROPS OPHTHALMIC at 05:50

## 2019-12-14 RX ADMIN — TRAMADOL HYDROCHLORIDE 50 MILLIGRAM(S): 50 TABLET ORAL at 13:00

## 2019-12-14 RX ADMIN — Medication 50 MILLIGRAM(S): at 09:08

## 2019-12-14 RX ADMIN — Medication 60 MILLIGRAM(S): at 05:49

## 2019-12-14 RX ADMIN — TRAMADOL HYDROCHLORIDE 50 MILLIGRAM(S): 50 TABLET ORAL at 14:00

## 2019-12-14 RX ADMIN — TRAMADOL HYDROCHLORIDE 50 MILLIGRAM(S): 50 TABLET ORAL at 20:25

## 2019-12-14 RX ADMIN — Medication 500000 UNIT(S): at 05:50

## 2019-12-14 RX ADMIN — Medication 25 MILLIGRAM(S): at 17:56

## 2019-12-14 RX ADMIN — SODIUM CHLORIDE 3 MILLILITER(S): 9 INJECTION INTRAMUSCULAR; INTRAVENOUS; SUBCUTANEOUS at 06:04

## 2019-12-14 RX ADMIN — GABAPENTIN 100 MILLIGRAM(S): 400 CAPSULE ORAL at 05:49

## 2019-12-14 RX ADMIN — Medication 500000 UNIT(S): at 17:48

## 2019-12-14 RX ADMIN — TRAMADOL HYDROCHLORIDE 50 MILLIGRAM(S): 50 TABLET ORAL at 06:40

## 2019-12-14 RX ADMIN — SODIUM CHLORIDE 3 MILLILITER(S): 9 INJECTION INTRAMUSCULAR; INTRAVENOUS; SUBCUTANEOUS at 21:43

## 2019-12-14 NOTE — PROGRESS NOTE ADULT - SUBJECTIVE AND OBJECTIVE BOX
Patient seen in follow up for dialysis services. Receiving dialysis now, remains oligouric via [place]. Kidney biopsy report awaited. No specific physical c/o other than thighs edema/    Erectile dysfunction  Glaucoma  Gout  HTN (hypertension)  ESRD (end stage renal disease) on dialysis  Contracture of finger joint  Carpal tunnel syndrome  Brain cyst  Anemia  Renal insufficiency  Gastric ulcer  Rheumatoid arthritis    MEDICATIONS  (STANDING):  allopurinol 100 milliGRAM(s) Oral daily  brimonidine 0.2% Ophthalmic Solution 1 Drop(s) Left EYE three times a day  chlorhexidine 4% Liquid 1 Application(s) Topical <User Schedule>  dextrose 5%. 1000 milliLiter(s) (50 mL/Hr) IV Continuous <Continuous>  dextrose 50% Injectable 12.5 Gram(s) IV Push once  dextrose 50% Injectable 25 Gram(s) IV Push once  dextrose 50% Injectable 25 Gram(s) IV Push once  dorzolamide 2% Ophthalmic Solution 1 Drop(s) Left EYE three times a day  famotidine    Tablet 20 milliGRAM(s) Oral daily  gabapentin 100 milliGRAM(s) Oral two times a day  hydrALAZINE 50 milliGRAM(s) Oral two times a day  insulin lispro (HumaLOG) corrective regimen sliding scale   SubCutaneous three times a day before meals  latanoprost 0.005% Ophthalmic Solution 1 Drop(s) Left EYE at bedtime  metoprolol tartrate 25 milliGRAM(s) Oral two times a day  mycophenolate mofetil 1 Gram(s) Oral every 12 hours  nystatin    Suspension 054044 Unit(s) Swish and Swallow four times a day  predniSONE   Tablet 60 milliGRAM(s) Oral daily  sodium chloride 0.9% lock flush 3 milliLiter(s) IV Push every 8 hours  trimethoprim   80 mG/sulfamethoxazole 400 mG 1 Tablet(s) Oral daily  valGANciclovir 450 milliGRAM(s) Oral <User Schedule>    MEDICATIONS  (PRN):  acetaminophen   Tablet .. 650 milliGRAM(s) Oral every 6 hours PRN Mild Pain (1 - 3)  dextrose 40% Gel 15 Gram(s) Oral once PRN Blood Glucose LESS THAN 70 milliGRAM(s)/deciliter  glucagon  Injectable 1 milliGRAM(s) IntraMuscular once PRN Glucose LESS THAN 70 milligrams/deciliter  lidocaine 2% Jelly 10 milliLiter(s) IntraUrethral three times a day PRN Ureteral irritation  ondansetron Injectable 4 milliGRAM(s) IV Push every 6 hours PRN Nausea and/or Vomiting  traMADol 25 milliGRAM(s) Oral every 4 hours PRN Moderate Pain (4 - 6)  traMADol 50 milliGRAM(s) Oral every 6 hours PRN Severe Pain (7 - 10)    T(C): 36.7 (12-14-19 @ 13:43), Max: 37.1 (12-13-19 @ 23:30)  HR: 105 (12-14-19 @ 13:43) (63 - 105)  BP: 107/63 (12-14-19 @ 13:43) (95/50 - 144/77)  RR: 18 (12-14-19 @ 13:43) (16 - 18)  SpO2: 96% (12-14-19 @ 13:43) (94% - 99%)  Wt(kg): --  I&O's Detail    13 Dec 2019 07:01  -  14 Dec 2019 07:00  --------------------------------------------------------  IN:    Oral Fluid: 125 mL  Total IN: 125 mL    OUT:    Indwelling Catheter - Urethral: 45 mL  Total OUT: 45 mL    Total NET: 80 mL      14 Dec 2019 07:01  -  14 Dec 2019 14:47  --------------------------------------------------------  IN:  Total IN: 0 mL    OUT:    Bulb: 10 mL    Indwelling Catheter - Urethral: 40 mL  Total OUT: 50 mL    Total NET: -50 mL          PHYSICAL EXAM:  General: NAD, AAO 3  No JVD  Respiratory: b/l air entry, clear  Cardiovascular: S1 S2 reg  Gastrointestinal: soft, BS present, BO present  Extremities:  moderate thighs edema  Left arm AVF patent  No skin rash  Neuro nonfocal    CBC Full  -  ( 14 Dec 2019 14:19 )  WBC Count : 10.95 K/uL  RBC Count : 2.43 M/uL  Hemoglobin : 7.9 g/dL  Hematocrit : 23.5 %  Platelet Count - Automated : 156 K/uL  Mean Cell Volume : 96.7 fl  Mean Cell Hemoglobin : 32.5 pg  Mean Cell Hemoglobin Concentration : 33.6 gm/dL  Auto Neutrophil # : x  Auto Lymphocyte # : x  Auto Monocyte # : x  Auto Eosinophil # : x  Auto Basophil # : x  Auto Neutrophil % : x  Auto Lymphocyte % : x  Auto Monocyte % : x  Auto Eosinophil % : x  Auto Basophil % : x    12-14    135  |  89<L>  |  35<H>  ----------------------------<  140<H>  3.1<L>   |  29  |  5.70<H>    Ca    7.7<L>      14 Dec 2019 06:55  Phos  4.6     12-14  Mg     2.1     12-14    TPro  6.1  /  Alb  3.5  /  TBili  0.8  /  DBili  x   /  AST  28  /  ALT  9<L>  /  AlkPhos  76  12-14        Sodium, Serum: 135 (12-14 @ 06:55)  Sodium, Serum: 138 (12-13 @ 04:53)  Sodium, Serum: 131 (12-12 @ 06:30)    Creatinine, Serum: 5.70 (12-14 @ 06:55)  Creatinine, Serum: 4.35 (12-13 @ 04:53)  Creatinine, Serum: 6.38 (12-12 @ 06:30)    Potassium, Serum: 3.1 (12-14 @ 06:55)  Potassium, Serum: 3.2 (12-13 @ 04:53)  Potassium, Serum: 4.8 (12-12 @ 06:30)    Hemoglobin: 7.9 (12-14 @ 14:19)  Hemoglobin: 7.7 (12-14 @ 06:55)  Hemoglobin: 8.1 (12-14 @ 00:31)  Hemoglobin: 7.5 (12-13 @ 09:05)  Hemoglobin: 7.6 (12-13 @ 06:00)  Hemoglobin: 8.3 (12-12 @ 11:17)  Hemoglobin: 8.1 (12-12 @ 06:30)

## 2019-12-14 NOTE — PROGRESS NOTE ADULT - SUBJECTIVE AND OBJECTIVE BOX
INTERVAL HPI/OVERNIGHT EVENTS:  Patient S&E at bedside. No o/n events, patient resting comfortably. No complaints at this time. Patient denies fever, chills, dizziness, weakness, CP, palpitations, SOB, cough, N/V/D/C, dysuria, changes in bowel movements, LE edema.    VITAL SIGNS:  T(F): 97.9 (12-14-19 @ 05:00)  HR: 69 (12-14-19 @ 05:00)  BP: 135/77 (12-14-19 @ 05:00)  RR: 18 (12-14-19 @ 05:00)  SpO2: 98% (12-14-19 @ 05:00)  Wt(kg): --    PHYSICAL EXAM:    Constitutional: WDWN, NAD,   Eyes: PERRL, EOMI, sclera non-icteric  Neck: supple, no masses, no JVD  Respiratory: CTA b/l, good air entry b/l, no wheezing, rhonchi, rales, with normal respiratory effort and no intercostal retractions  Cardiovascular: RRR, normal S1S2, no M/R/G  Gastrointestinal: soft, NTND, no masses palpable, BS normal in all four quadrants, no HSM  Extremities: WWP, no c/c/e  Neurological: AAOx3  Skin: Normal temperature    MEDICATIONS  (STANDING):  allopurinol 100 milliGRAM(s) Oral daily  brimonidine 0.2% Ophthalmic Solution 1 Drop(s) Left EYE three times a day  chlorhexidine 4% Liquid 1 Application(s) Topical <User Schedule>  dextrose 5%. 1000 milliLiter(s) (50 mL/Hr) IV Continuous <Continuous>  dextrose 50% Injectable 12.5 Gram(s) IV Push once  dextrose 50% Injectable 25 Gram(s) IV Push once  dextrose 50% Injectable 25 Gram(s) IV Push once  dorzolamide 2% Ophthalmic Solution 1 Drop(s) Left EYE three times a day  famotidine    Tablet 20 milliGRAM(s) Oral daily  gabapentin 100 milliGRAM(s) Oral two times a day  hydrALAZINE 50 milliGRAM(s) Oral two times a day  insulin lispro (HumaLOG) corrective regimen sliding scale   SubCutaneous three times a day before meals  latanoprost 0.005% Ophthalmic Solution 1 Drop(s) Left EYE at bedtime  metoprolol tartrate 25 milliGRAM(s) Oral two times a day  mycophenolate mofetil 1 Gram(s) Oral every 12 hours  nystatin    Suspension 839800 Unit(s) Swish and Swallow four times a day  predniSONE   Tablet 60 milliGRAM(s) Oral daily  sodium chloride 0.9% lock flush 3 milliLiter(s) IV Push every 8 hours  trimethoprim   80 mG/sulfamethoxazole 400 mG 1 Tablet(s) Oral daily  valGANciclovir 450 milliGRAM(s) Oral <User Schedule>    MEDICATIONS  (PRN):  acetaminophen   Tablet .. 650 milliGRAM(s) Oral every 6 hours PRN Mild Pain (1 - 3)  dextrose 40% Gel 15 Gram(s) Oral once PRN Blood Glucose LESS THAN 70 milliGRAM(s)/deciliter  glucagon  Injectable 1 milliGRAM(s) IntraMuscular once PRN Glucose LESS THAN 70 milligrams/deciliter  lidocaine 2% Jelly 10 milliLiter(s) IntraUrethral three times a day PRN Ureteral irritation  ondansetron Injectable 4 milliGRAM(s) IV Push every 6 hours PRN Nausea and/or Vomiting  traMADol 25 milliGRAM(s) Oral every 4 hours PRN Moderate Pain (4 - 6)  traMADol 50 milliGRAM(s) Oral every 6 hours PRN Severe Pain (7 - 10)      Allergies    coconut (Anaphylaxis)  morphine (Pruritus; Rash)    Intolerances        LABS:                        7.7    12.09 )-----------( 150      ( 14 Dec 2019 06:55 )             22.5     12-14    135  |  89<L>  |  35<H>  ----------------------------<  140<H>  3.1<L>   |  29  |  5.70<H>    Ca    7.7<L>      14 Dec 2019 06:55  Phos  4.6     12-14  Mg     2.1     12-14    TPro  6.1  /  Alb  3.5  /  TBili  0.8  /  DBili  x   /  AST  28  /  ALT  9<L>  /  AlkPhos  76  12-14    PT/INR - ( 14 Dec 2019 00:31 )   PT: 11.0 sec;   INR: 0.97 ratio         PTT - ( 14 Dec 2019 00:31 )  PTT:22.6 sec      RADIOLOGY & ADDITIONAL TESTS:  Studies reviewed.

## 2019-12-14 NOTE — PROGRESS NOTE ADULT - ASSESSMENT
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 Young, followed by Dr. Raphael, [facility] Oncologist), RA with hand contractures  s/p DDRT on 12/8/19 complicated by DGF requiring HD now with elevated LDH, thrombocytopenia and + schistocytes on smear concerning for TMA.     Kidney replaced by transplant:   - Delayed graft function requiring HD. Oliguric.  HD 12/12 and 12/14  - Elevated LDH and Thrombocytopenia, + Schistocytes all concerning for TMA  - PLEX #1 12/12  - FU ADAMTS, TMA Complement panel sent 12/12 ([facility]) and  US Genetic susceptibility panel today ( [facility])  - Appreciate Heme consult  - s/p Renal transplant bx yesterday, awaiting path results  - DC martinez with stent today  - On PRN Tylenol and tramadol for pain  - Immunosuppression: Hold Envarsus.  Continue cellcept.  Prednisone 1mg/kg (60mg) daily until [id number] is resulted as per Heme recommendations, received Belatacept 12/13   - PPX: Nystatin, bactrim, Valcyte renally dosed BIW  - repeat CBC at noon    HTN (Hypertension)  well controlled  Continue Metoprolol and hydralazine

## 2019-12-14 NOTE — PROGRESS NOTE ADULT - SUBJECTIVE AND OBJECTIVE BOX
Patient is a 49y old  Male who presents with a chief complaint of DDRT (14 Dec 2019 11:54)      HPI:  50 yo M with hx of ESRD on HD, s/p DDRT on 12/8, elevated SCr on HD using AVF, s/p PLEX on 12/12 due to concern for TMA (drop in platelet counts 200s-->40s, schistocytes on PB, elevated , hapto <20, Tbili 1.3). MISCCR15/aHUS panel pending results.  Pt was on prograf (D/Tanvir on 12/12) which could cause drug induced TMA. Renal biopsy on 12/13- pending results. Patient received PLT transfusion prior to biopsy, 1 unit PRBC last night.    Patient seen and examined. No acute events overnight. Platelet count improving since prograf D/C, sustained at 150K today, hgb 7.7, hemolytic markers improving, , hapto 72, Tbili 0.8.      PAST MEDICAL & SURGICAL HISTORY:  Erectile dysfunction  Glaucoma  Gout  HTN (hypertension)  ESRD (end stage renal disease) on dialysis: since 7/2013 tue, thur, sat   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  Carpal tunnel syndrome  Brain cyst: had MRI recently- now gone  Anemia  Gastric ulcer: Long time ago  Rheumatoid arthritis  Breakdown (mechanical) of penile (implanted) prosthesis, sequela  End-stage renal disease (ESRD): PERMACATH PLACEMENT  Abscess of left buttock: s/p I &amp; D  AV fistula: placement left lower arm  S/P cystoscopy: stent placement &amp; removal for kidney stones, lithiotripsy  s/p Lt Carpal tunnel: 2011      MEDICATIONS  (STANDING):  allopurinol 100 milliGRAM(s) Oral daily  brimonidine 0.2% Ophthalmic Solution 1 Drop(s) Left EYE three times a day  chlorhexidine 4% Liquid 1 Application(s) Topical <User Schedule>  dextrose 5%. 1000 milliLiter(s) (50 mL/Hr) IV Continuous <Continuous>  dextrose 50% Injectable 12.5 Gram(s) IV Push once  dextrose 50% Injectable 25 Gram(s) IV Push once  dextrose 50% Injectable 25 Gram(s) IV Push once  dorzolamide 2% Ophthalmic Solution 1 Drop(s) Left EYE three times a day  famotidine    Tablet 20 milliGRAM(s) Oral daily  gabapentin 100 milliGRAM(s) Oral two times a day  hydrALAZINE 50 milliGRAM(s) Oral two times a day  insulin lispro (HumaLOG) corrective regimen sliding scale   SubCutaneous three times a day before meals  latanoprost 0.005% Ophthalmic Solution 1 Drop(s) Left EYE at bedtime  metoprolol tartrate 25 milliGRAM(s) Oral two times a day  mycophenolate mofetil 1 Gram(s) Oral every 12 hours  nystatin    Suspension [id number] Unit(s) Swish and Swallow four times a day  predniSONE   Tablet 60 milliGRAM(s) Oral daily  sodium chloride 0.9% lock flush 3 milliLiter(s) IV Push every 8 hours  trimethoprim   80 mG/sulfamethoxazole 400 mG 1 Tablet(s) Oral daily  valGANciclovir 450 milliGRAM(s) Oral <User Schedule>    MEDICATIONS  (PRN):  acetaminophen   Tablet .. 650 milliGRAM(s) Oral every 6 hours PRN Mild Pain (1 - 3)  dextrose 40% Gel 15 Gram(s) Oral once PRN Blood Glucose LESS THAN 70 milliGRAM(s)/deciliter  glucagon  Injectable 1 milliGRAM(s) IntraMuscular once PRN Glucose LESS THAN 70 milligrams/deciliter  lidocaine 2% Jelly 10 milliLiter(s) IntraUrethral three times a day PRN Ureteral irritation  ondansetron Injectable 4 milliGRAM(s) IV Push every 6 hours PRN Nausea and/or Vomiting  traMADol 25 milliGRAM(s) Oral every 4 hours PRN Moderate Pain (4 - 6)  traMADol 50 milliGRAM(s) Oral every 6 hours PRN Severe Pain (7 - 10)      Allergies  coconut (Anaphylaxis)  morphine (Pruritus; Rash)      REVIEW OF SYSTEMS:    CONSTITUTIONAL: No weakness, No fevers or chills  EYES/ENT: No visual changes;  No vertigo or throat pain   RESPIRATORY: No cough, wheezing, hemoptysis; No shortness of breath  CARDIOVASCULAR: No chest pain or palpitations  GASTROINTESTINAL: No abdominal or epigastric pain. No nausea, vomiting, or hematemesis; No diarrhea or constipation. No melena or hematochezia  MUSCULOSKELETAL: No joint or muscle pain  NEUROLOGICAL: No HA, No numbness or weakness  HEMATOLOGY: No bleeding, No bruising  SKIN: No itching, burning, rashes, petechia, or lesions  ENDOCRINE: No heat or cold intolerance      Vital Signs Last 24 Hrs  T(C): 36.8 (14 Dec 2019 09:09), Max: 37.1 (13 Dec 2019 23:30)  T(F): 98.3 (14 Dec 2019 09:09), Max: 98.7 (13 Dec 2019 23:30)  HR: 68 (14 Dec 2019 09:09) (63 - 79)  BP: 137/78 (14 Dec 2019 09:09) (95/50 - 144/77)  BP(mean): 101 (13 Dec 2019 16:00) (101 - 101)  RR: 18 (14 Dec 2019 09:09) (16 - 18)  SpO2: 98% (14 Dec 2019 05:00) (96% - 99%)      PHYSICAL EXAM:  General: WN/WD, NAD  Eyes: Anicteriic  ENT: MMM  Respiratory: CTA B/L  CV:+ S1, S2,  RRR  Abdominal: Soft, NT, ND, +BS  Musculoskeletal/Extremities: No atrophy,, no edema, 2+ peripheral pulses  Neurology: A&Ox3, no focal deficits   Skin: No rash, no petechia                          7.7    12.09 )-----------( 150      ( 14 Dec 2019 06:55 )             22.5     Reticulocyte Percent: 6.4 % (12-14 @ 06:55)  Reticulocyte Percent: 7.8 % (12-13 @ 04:53)    Hematocrit: 22.5 % (12-14 @ 06:55)  Hematocrit: 24.2 % (12-14 @ 00:31)  Hematocrit: 21.8 % (12-13 @ 09:05)  Hematocrit: 22.7 % (12-13 @ 06:00)  Hematocrit: 24.3 % (12-12 @ 11:17)  Hematocrit: 23.6 % (12-12 @ 06:30)    12-14    135  |  89<L>  |  35<H>  ----------------------------<  140<H>  3.1<L>   |  29  |  5.70<H>    Ca    7.7<L>      14 Dec 2019 06:55  Phos  4.6     12-14  Mg     2.1     12-14    TPro  6.1  /  Alb  3.5  /  TBili  0.8  /  DBili  x   /  AST  28  /  ALT  9<L>  /  AlkPhos  76  12-14    Lactate Dehydrogenase, Serum: 283 U/L (12-14 @ 06:55)  Lactate Dehydrogenase, Serum: 300 U/L (12-13 @ 04:53)  Lactate Dehydrogenase, Serum: 589 U/L (12-12 @ 06:30)    Haptoglobin, Serum: 72 mg/dL (12-14 @ 11:03)  Haptoglobin, Serum: 52 mg/dL (12-13 @ 08:09)  Haptoglobin, Serum: <20 mg/dL (12-12 @ 14:22)      PT/INR - ( 14 Dec 2019 00:31 )   PT: 11.0 sec;   INR: 0.97 ratio    PTT - ( 14 Dec 2019 00:31 )  PTT:22.6 sec  Fibrinogen Assay: 409 mg/dL (12-14 @ 00:31)

## 2019-12-14 NOTE — PROGRESS NOTE ADULT - ASSESSMENT
50 yo M with hx of ESRD on HD, s/p DDRT on 12/8, elevated SCr on HD using AVF, s/p PLEX on 12/12 due to concern for TMA (drop in platelet counts 200s-->40s, schistocytes on PB, elevated , hapto <20, Tbili 1.3,). [id number]/aHUS panel pending results.    Pt was on prograf (D/Tanvir on 12/12) which could cause drug induced TMA. Renal biopsy on 12/13- pending results. Patient received PLT transfusion prior to biopsy, 1 unit PRBC last night. Scheduled for HD this afternoon.     Platelet count improving since prograf D/C, sustained at 150K today, hgb 7.7, hemolytic markers improving, , Hapto 72, Tbili 0.8.    Discussed with Nephrology and Transplant teams and it was determined to hold PLEX today given continued improvement in platelet count/hemolytic markers.    To reevaluate on 12/15.

## 2019-12-14 NOTE — PROGRESS NOTE ADULT - SUBJECTIVE AND OBJECTIVE BOX
Transplant Surgery - Multidisciplinary Rounds  --------------------------------------------------------------  DDRT     Date: 12/8/2019      POD 6     Present:  Patient seen and examined with Dr. Poe and MAEVE Montano during am rounds. Disciplines not in attendance will be notified of the plan.    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 Young, followed by Dr. Raphael, [facility] Oncologist), RA with hand contractures, now s/p DDRT on 12/8/19 (ureteral stent sutured to martinez)     Last colonoscopy: clear 2017  Onc: cleared by [place] 2019 (low grade NET, risk of HD greater than recurrence of low grade NET, recent PET/MRI neg)    Reccipient info:   CPRA:    o%  ABO:      A+  CMVAb:  Positive  EBVIgG Status:  Positive  Last HD:  12/7/2019    Donor ID:  [id number]  Match: [id number]  OPO: NYRT  Age:  49  ABO:  A  KDPI 81%  COD:  Anoxia  X Clamp Time:  12/7/2019 1538.  Medical Hx: DM, HTN, HLD, obesity BMI 53, CAD, CABAGX3  Terminal Cr:  1/1.8/1.7  CMV- Neg EBV- Neg    OR:    DDRT to right iliac fossa. Simulect induction. Two arteries anastomosed to a single cuff on the back table. One vein, one ureter. Ureteral anastomosis performed over a double J stent, which was sutured to the martinez.   Cold ischemia time 25.5 hours.    Interval Events:  POD 6 s/p DDRT c/b DGF requiring HD  - s/p renal transplant bx yesterday ( to r/o TMA), awaiting path results  - Hematology following: received PLEX 12/12, recommend holding PLEX today, LDH decreasing, . Will re-evaluate on Sunday  - Remains with minimal u/o (bloody)   - H/H 7.7/22 (s/p 1u PRBC yesterday)     Potential Discharge date: Pending clinical improvement     Education: Medications and plan of care    Plan of care: See below    MEDICATIONS  (STANDING):  allopurinol 100 milliGRAM(s) Oral daily  brimonidine 0.2% Ophthalmic Solution 1 Drop(s) Left EYE three times a day  chlorhexidine 4% Liquid 1 Application(s) Topical <User Schedule>  dextrose 5%. 1000 milliLiter(s) (50 mL/Hr) IV Continuous <Continuous>  dextrose 50% Injectable 12.5 Gram(s) IV Push once  dextrose 50% Injectable 25 Gram(s) IV Push once  dextrose 50% Injectable 25 Gram(s) IV Push once  dorzolamide 2% Ophthalmic Solution 1 Drop(s) Left EYE three times a day  famotidine    Tablet 20 milliGRAM(s) Oral daily  gabapentin 100 milliGRAM(s) Oral two times a day  hydrALAZINE 50 milliGRAM(s) Oral two times a day  insulin lispro (HumaLOG) corrective regimen sliding scale   SubCutaneous three times a day before meals  latanoprost 0.005% Ophthalmic Solution 1 Drop(s) Left EYE at bedtime  metoprolol tartrate 25 milliGRAM(s) Oral two times a day  mycophenolate mofetil 1 Gram(s) Oral every 12 hours  nystatin    Suspension [id number] Unit(s) Swish and Swallow four times a day  predniSONE   Tablet 60 milliGRAM(s) Oral daily  sodium chloride 0.9% lock flush 3 milliLiter(s) IV Push every 8 hours  trimethoprim   80 mG/sulfamethoxazole 400 mG 1 Tablet(s) Oral daily  valGANciclovir 450 milliGRAM(s) Oral <User Schedule>    MEDICATIONS  (PRN):  acetaminophen   Tablet .. 650 milliGRAM(s) Oral every 6 hours PRN Mild Pain (1 - 3)  dextrose 40% Gel 15 Gram(s) Oral once PRN Blood Glucose LESS THAN 70 milliGRAM(s)/deciliter  glucagon  Injectable 1 milliGRAM(s) IntraMuscular once PRN Glucose LESS THAN 70 milligrams/deciliter  lidocaine 2% Jelly 10 milliLiter(s) IntraUrethral three times a day PRN Ureteral irritation  ondansetron Injectable 4 milliGRAM(s) IV Push every 6 hours PRN Nausea and/or Vomiting  traMADol 25 milliGRAM(s) Oral every 4 hours PRN Moderate Pain (4 - 6)  traMADol 50 milliGRAM(s) Oral every 6 hours PRN Severe Pain (7 - 10)      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  Carpal tunnel syndrome  Brain cyst: had MRI recently- now gone  Anemia  Gastric ulcer: Long time ago  Rheumatoid arthritis  Breakdown (mechanical) of penile (implanted) prosthesis, sequela  End-stage renal disease (ESRD): PERMACATH PLACEMENT  Abscess of left buttock: s/p I &amp; D  AV fistula: placement left lower arm  S/P cystoscopy: stent placement &amp; removal for kidney stones, lithiotripsy  s/p Lt Carpal tunnel: 2011      Vital Signs Last 24 Hrs  T(C): 36.8 (14 Dec 2019 09:09), Max: 37.1 (13 Dec 2019 23:30)  T(F): 98.3 (14 Dec 2019 09:09), Max: 98.7 (13 Dec 2019 23:30)  HR: 68 (14 Dec 2019 09:09) (63 - 79)  BP: 137/78 (14 Dec 2019 09:09) (95/50 - 144/77)  BP(mean): 101 (13 Dec 2019 16:00) (101 - 101)  RR: 18 (14 Dec 2019 09:09) (16 - 18)  SpO2: 98% (14 Dec 2019 05:00) (96% - 99%)    I&O's Summary    13 Dec 2019 07:01  -  14 Dec 2019 07:00  --------------------------------------------------------  IN: 125 mL / OUT: 45 mL / NET: 80 mL    14 Dec 2019 07:01  -  14 Dec 2019 12:00  --------------------------------------------------------  IN: 0 mL / OUT: 50 mL / NET: -50 mL                        7.7    12.09 )-----------( 150      ( 14 Dec 2019 06:55 )             22.5     12-14    135  |  89<L>  |  35<H>  ----------------------------<  140<H>  3.1<L>   |  29  |  5.70<H>    Ca    7.7<L>      14 Dec 2019 06:55  Phos  4.6     12-14  Mg     2.1     12-14    TPro  6.1  /  Alb  3.5  /  TBili  0.8  /  DBili  x   /  AST  28  /  ALT  9<L>  /  AlkPhos  76  12-14    Tacrolimus (), Serum: 2.4 ng/mL (12-14 @ 08:39)        Culture - Nose (collected 12-10-19 @ 17:21)  Source: .Nose Nose  Final Report (12-13-19 @ 18:51):    Few Methicillin Sensitive Staph aureus "This can represent normal nasal    carriage.  PCR is more sensitive for identifying MRSA/MSSA carriage"      REVIEW OF SYSTEMS  --------------------------------------------------------------------------------  Gen: No weight changes, fatigue, fevers/chills, + weakness  Skin: No rashes  Head/Eyes/Ears/Mouth: No headache; Normal hearing; Normal vision w/o blurriness; No sinus pain/discomfort, sore throat  Respiratory: No dyspnea, cough, wheezing, hemoptysis  CV: No chest pain, PND, orthopnea  GI: No abdominal pain, constipation, nausea, vomiting, melena, hematochezia. + Diarrhea overnight  : c/o martinez pain. No increased frequency, dysuria, hematuria, nocturia  MSK: No joint pain/swelling; no back pain; no edema  Neuro: No dizziness/lightheadedness, weakness, seizures, numbness, tingling  Heme: No easy bruising or bleeding  Endo: No heat/cold intolerance  Psych: No significant nervousness, anxiety, stress, depression  All other systems were reviewed and are negative, except as noted.      PHYSICAL EXAM:  Constitutional: Well developed / well nourished  Eyes: Anicteric, PERRLA  ENMT: nc/at  Neck: Supple  Respiratory: CTA B/L  Cardiovascular: RRR  Gastrointestinal: Soft abdomen, NT, ND  Genitourinary:  Martinez in place  Extremities: SCD's in place and working bilaterally  Vascular: Palpable dp pulses bilaterally  Neurological: A&O x3  Skin: incision c/d/i, BO  with ss  drainage  Musculoskeletal: Moving all extremities  Psychiatric: Responsive

## 2019-12-14 NOTE — PROGRESS NOTE ADULT - ASSESSMENT
49 year old male with ESRD on HD  a/p kidney transplant on 12/8, presents for potential DDRT was admitted for elective kidney transplant. Hematology was consulted for elevated LHD and new thrombocytopenia.      # TMA   Smear on 12/12 shows average >3 schistocytes/HPF. LDH was elevated and hpt was low initially. Hgb is chronically low likely 2/2 ESRD. Plt was in 200s on admission and now 40s. Unable to assess kidney function due to ESRD and recent transplant. TTP cannot completely ruled out although it is less likely clinically. Pt was on tacrolimus, which could cause drug induced TMA. Tacro was discontinued on 12/12. s/p PLEX on 12/12. Hpt was normal on 12/13.   -Daily CBC with diff, CMP, Hapto, LDH, retic daily  -F/U BÁRBARA TS 13  -Continue Prednisone 1mg/kg daily until BÁRBARA TS 13 comes back  -F/U kidney Bx result done on 12/13

## 2019-12-14 NOTE — PROGRESS NOTE ADULT - ASSESSMENT
ESRD on HD  S/p DDRT on 12/8/2019  DGF  S/p transplant kidney bx yesterday path awaited  Immunosuppresion/transplant management per transplant team  As pt is oligo-anuric, will continue HD 3 x week  Tolerating HD today, removing approximately 1200 ml, 4 K bath, maintaining stable BP  Heparin free  Epogen as per transplant team/Hem/Onc

## 2019-12-15 LAB
ADAMTS13 ACTIVITY: 43.5 % — LOW
ADAMTS13-COMMENT: SIGNIFICANT CHANGE UP
ALBUMIN SERPL ELPH-MCNC: 3.4 G/DL — SIGNIFICANT CHANGE UP (ref 3.3–5)
ALP SERPL-CCNC: 96 U/L — SIGNIFICANT CHANGE UP (ref 40–120)
ALT FLD-CCNC: 21 U/L — SIGNIFICANT CHANGE UP (ref 10–45)
ANION GAP SERPL CALC-SCNC: 15 MMOL/L — SIGNIFICANT CHANGE UP (ref 5–17)
AST SERPL-CCNC: 56 U/L — HIGH (ref 10–40)
BILIRUB SERPL-MCNC: 0.8 MG/DL — SIGNIFICANT CHANGE UP (ref 0.2–1.2)
BUN SERPL-MCNC: 28 MG/DL — HIGH (ref 7–23)
CA-I BLD-SCNC: 1.05 MMOL/L — LOW (ref 1.12–1.3)
CALCIUM SERPL-MCNC: 8.1 MG/DL — LOW (ref 8.4–10.5)
CHLORIDE SERPL-SCNC: 92 MMOL/L — LOW (ref 96–108)
CO2 SERPL-SCNC: 27 MMOL/L — SIGNIFICANT CHANGE UP (ref 22–31)
CREAT SERPL-MCNC: 4.45 MG/DL — HIGH (ref 0.5–1.3)
GLUCOSE BLDC GLUCOMTR-MCNC: 138 MG/DL — HIGH (ref 70–99)
GLUCOSE BLDC GLUCOMTR-MCNC: 173 MG/DL — HIGH (ref 70–99)
GLUCOSE BLDC GLUCOMTR-MCNC: 186 MG/DL — HIGH (ref 70–99)
GLUCOSE BLDC GLUCOMTR-MCNC: 200 MG/DL — HIGH (ref 70–99)
GLUCOSE SERPL-MCNC: 105 MG/DL — HIGH (ref 70–99)
HAPTOGLOB SERPL-MCNC: 83 MG/DL — SIGNIFICANT CHANGE UP (ref 34–200)
HCT VFR BLD CALC: 24.3 % — LOW (ref 39–50)
HGB BLD-MCNC: 8 G/DL — LOW (ref 13–17)
LDH SERPL L TO P-CCNC: 321 U/L — HIGH (ref 50–242)
MAGNESIUM SERPL-MCNC: 2 MG/DL — SIGNIFICANT CHANGE UP (ref 1.6–2.6)
MCHC RBC-ENTMCNC: 32.7 PG — SIGNIFICANT CHANGE UP (ref 27–34)
MCHC RBC-ENTMCNC: 32.9 GM/DL — SIGNIFICANT CHANGE UP (ref 32–36)
MCV RBC AUTO: 99.2 FL — SIGNIFICANT CHANGE UP (ref 80–100)
NRBC # BLD: 3 /100 WBCS — HIGH (ref 0–0)
PHOSPHATE SERPL-MCNC: 3.2 MG/DL — SIGNIFICANT CHANGE UP (ref 2.5–4.5)
PLATELET # BLD AUTO: 128 K/UL — LOW (ref 150–400)
POTASSIUM SERPL-MCNC: 3.8 MMOL/L — SIGNIFICANT CHANGE UP (ref 3.5–5.3)
POTASSIUM SERPL-SCNC: 3.8 MMOL/L — SIGNIFICANT CHANGE UP (ref 3.5–5.3)
PROT SERPL-MCNC: 6.3 G/DL — SIGNIFICANT CHANGE UP (ref 6–8.3)
RBC # BLD: 2.45 M/UL — LOW (ref 4.2–5.8)
RBC # BLD: 2.45 M/UL — LOW (ref 4.2–5.8)
RBC # FLD: 19.8 % — HIGH (ref 10.3–14.5)
RETICS #: 165.6 K/UL — HIGH (ref 25–125)
RETICS/RBC NFR: 6.8 % — HIGH (ref 0.5–2.5)
SODIUM SERPL-SCNC: 134 MMOL/L — LOW (ref 135–145)
TACROLIMUS SERPL-MCNC: <2 NG/ML — SIGNIFICANT CHANGE UP
WBC # BLD: 12.27 K/UL — HIGH (ref 3.8–10.5)
WBC # FLD AUTO: 12.27 K/UL — HIGH (ref 3.8–10.5)

## 2019-12-15 PROCEDURE — 36514 APHERESIS PLASMA: CPT

## 2019-12-15 PROCEDURE — 99233 SBSQ HOSP IP/OBS HIGH 50: CPT | Mod: GC

## 2019-12-15 PROCEDURE — 99232 SBSQ HOSP IP/OBS MODERATE 35: CPT | Mod: GC

## 2019-12-15 RX ORDER — ACETAMINOPHEN 500 MG
650 TABLET ORAL ONCE
Refills: 0 | Status: COMPLETED | OUTPATIENT
Start: 2019-12-15 | End: 2019-12-15

## 2019-12-15 RX ORDER — DIPHENHYDRAMINE HCL 50 MG
50 CAPSULE ORAL ONCE
Refills: 0 | Status: COMPLETED | OUTPATIENT
Start: 2019-12-15 | End: 2019-12-15

## 2019-12-15 RX ADMIN — Medication 25 MILLIGRAM(S): at 05:28

## 2019-12-15 RX ADMIN — Medication 25 MILLIGRAM(S): at 17:50

## 2019-12-15 RX ADMIN — SODIUM CHLORIDE 3 MILLILITER(S): 9 INJECTION INTRAMUSCULAR; INTRAVENOUS; SUBCUTANEOUS at 21:09

## 2019-12-15 RX ADMIN — MYCOPHENOLATE MOFETIL 1 GRAM(S): 250 CAPSULE ORAL at 17:59

## 2019-12-15 RX ADMIN — Medication 50 MILLIGRAM(S): at 15:13

## 2019-12-15 RX ADMIN — Medication 1: at 12:26

## 2019-12-15 RX ADMIN — Medication 500000 UNIT(S): at 23:16

## 2019-12-15 RX ADMIN — MYCOPHENOLATE MOFETIL 1 GRAM(S): 250 CAPSULE ORAL at 05:33

## 2019-12-15 RX ADMIN — TRAMADOL HYDROCHLORIDE 25 MILLIGRAM(S): 50 TABLET ORAL at 11:36

## 2019-12-15 RX ADMIN — GABAPENTIN 100 MILLIGRAM(S): 400 CAPSULE ORAL at 05:28

## 2019-12-15 RX ADMIN — DORZOLAMIDE HYDROCHLORIDE 1 DROP(S): 20 SOLUTION/ DROPS OPHTHALMIC at 05:30

## 2019-12-15 RX ADMIN — DORZOLAMIDE HYDROCHLORIDE 1 DROP(S): 20 SOLUTION/ DROPS OPHTHALMIC at 13:59

## 2019-12-15 RX ADMIN — Medication 1: at 17:49

## 2019-12-15 RX ADMIN — Medication 500000 UNIT(S): at 05:30

## 2019-12-15 RX ADMIN — LATANOPROST 1 DROP(S): 0.05 SOLUTION/ DROPS OPHTHALMIC; TOPICAL at 21:07

## 2019-12-15 RX ADMIN — Medication 500000 UNIT(S): at 11:29

## 2019-12-15 RX ADMIN — SODIUM CHLORIDE 3 MILLILITER(S): 9 INJECTION INTRAMUSCULAR; INTRAVENOUS; SUBCUTANEOUS at 14:02

## 2019-12-15 RX ADMIN — Medication 500000 UNIT(S): at 17:50

## 2019-12-15 RX ADMIN — Medication 100 MILLIGRAM(S): at 11:29

## 2019-12-15 RX ADMIN — FAMOTIDINE 20 MILLIGRAM(S): 10 INJECTION INTRAVENOUS at 11:29

## 2019-12-15 RX ADMIN — Medication 60 MILLIGRAM(S): at 05:30

## 2019-12-15 RX ADMIN — DORZOLAMIDE HYDROCHLORIDE 1 DROP(S): 20 SOLUTION/ DROPS OPHTHALMIC at 21:07

## 2019-12-15 RX ADMIN — BRIMONIDINE TARTRATE 1 DROP(S): 2 SOLUTION/ DROPS OPHTHALMIC at 05:31

## 2019-12-15 RX ADMIN — Medication 650 MILLIGRAM(S): at 15:14

## 2019-12-15 RX ADMIN — Medication 50 MILLIGRAM(S): at 11:29

## 2019-12-15 RX ADMIN — TRAMADOL HYDROCHLORIDE 50 MILLIGRAM(S): 50 TABLET ORAL at 06:31

## 2019-12-15 RX ADMIN — SODIUM CHLORIDE 3 MILLILITER(S): 9 INJECTION INTRAMUSCULAR; INTRAVENOUS; SUBCUTANEOUS at 05:27

## 2019-12-15 RX ADMIN — Medication 50 MILLIGRAM(S): at 17:50

## 2019-12-15 RX ADMIN — GABAPENTIN 100 MILLIGRAM(S): 400 CAPSULE ORAL at 17:50

## 2019-12-15 RX ADMIN — Medication 1 TABLET(S): at 11:29

## 2019-12-15 RX ADMIN — BRIMONIDINE TARTRATE 1 DROP(S): 2 SOLUTION/ DROPS OPHTHALMIC at 21:07

## 2019-12-15 RX ADMIN — BRIMONIDINE TARTRATE 1 DROP(S): 2 SOLUTION/ DROPS OPHTHALMIC at 13:57

## 2019-12-15 RX ADMIN — TRAMADOL HYDROCHLORIDE 25 MILLIGRAM(S): 50 TABLET ORAL at 12:30

## 2019-12-15 RX ADMIN — TRAMADOL HYDROCHLORIDE 50 MILLIGRAM(S): 50 TABLET ORAL at 22:00

## 2019-12-15 RX ADMIN — TRAMADOL HYDROCHLORIDE 50 MILLIGRAM(S): 50 TABLET ORAL at 21:08

## 2019-12-15 NOTE — PROGRESS NOTE ADULT - SUBJECTIVE AND OBJECTIVE BOX
INTERVAL HPI/OVERNIGHT EVENTS:  Patient S&E at bedside. No o/n events, patient resting comfortably. No complaints at this time. Patient denies fever, chills, dizziness, weakness, CP, palpitations, SOB, cough, N/V/D/C, dysuria, changes in bowel movements, LE edema.    VITAL SIGNS:  T(F): 98.2 (12-15-19 @ 05:00)  HR: 70 (12-15-19 @ 05:00)  BP: 143/80 (12-15-19 @ 05:00)  RR: 18 (12-15-19 @ 05:00)  SpO2: 99% (12-15-19 @ 05:00)  Wt(kg): --    PHYSICAL EXAM:    Constitutional: WDWN, NAD,   Eyes: PERRL, EOMI, sclera non-icteric  Neck: supple, no masses, no JVD  Respiratory: CTA b/l, good air entry b/l, no wheezing, rhonchi, rales, with normal respiratory effort and no intercostal retractions  Cardiovascular: RRR, normal S1S2, no M/R/G  Gastrointestinal: soft, NTND, no masses palpable, BS normal in all four quadrants, no HSM  Extremities: WWP, no c/c/e  Neurological: AAOx3  Skin: Normal temperature    MEDICATIONS  (STANDING):  allopurinol 100 milliGRAM(s) Oral daily  brimonidine 0.2% Ophthalmic Solution 1 Drop(s) Left EYE three times a day  chlorhexidine 4% Liquid 1 Application(s) Topical <User Schedule>  dextrose 5%. 1000 milliLiter(s) (50 mL/Hr) IV Continuous <Continuous>  dextrose 50% Injectable 12.5 Gram(s) IV Push once  dextrose 50% Injectable 25 Gram(s) IV Push once  dextrose 50% Injectable 25 Gram(s) IV Push once  dorzolamide 2% Ophthalmic Solution 1 Drop(s) Left EYE three times a day  famotidine    Tablet 20 milliGRAM(s) Oral daily  gabapentin 100 milliGRAM(s) Oral two times a day  hydrALAZINE 50 milliGRAM(s) Oral two times a day  insulin lispro (HumaLOG) corrective regimen sliding scale   SubCutaneous three times a day before meals  latanoprost 0.005% Ophthalmic Solution 1 Drop(s) Left EYE at bedtime  metoprolol tartrate 25 milliGRAM(s) Oral two times a day  mycophenolate mofetil 1 Gram(s) Oral every 12 hours  nystatin    Suspension [id number] Unit(s) Swish and Swallow four times a day  predniSONE   Tablet 60 milliGRAM(s) Oral daily  sodium chloride 0.9% lock flush 3 milliLiter(s) IV Push every 8 hours  trimethoprim   80 mG/sulfamethoxazole 400 mG 1 Tablet(s) Oral daily  valGANciclovir 450 milliGRAM(s) Oral <User Schedule>    MEDICATIONS  (PRN):  acetaminophen   Tablet .. 650 milliGRAM(s) Oral every 6 hours PRN Mild Pain (1 - 3)  dextrose 40% Gel 15 Gram(s) Oral once PRN Blood Glucose LESS THAN 70 milliGRAM(s)/deciliter  glucagon  Injectable 1 milliGRAM(s) IntraMuscular once PRN Glucose LESS THAN 70 milligrams/deciliter  lidocaine 2% Jelly 10 milliLiter(s) IntraUrethral three times a day PRN Ureteral irritation  ondansetron Injectable 4 milliGRAM(s) IV Push every 6 hours PRN Nausea and/or Vomiting  traMADol 25 milliGRAM(s) Oral every 4 hours PRN Moderate Pain (4 - 6)  traMADol 50 milliGRAM(s) Oral every 6 hours PRN Severe Pain (7 - 10)      Allergies    coconut (Anaphylaxis)  morphine (Pruritus; Rash)    Intolerances        LABS:                        8.0    12.27 )-----------( 128      ( 15 Dec 2019 06:21 )             24.3     12-15    134<L>  |  92<L>  |  28<H>  ----------------------------<  105<H>  3.8   |  27  |  4.45<H>    Ca    8.1<L>      15 Dec 2019 06:21  Phos  3.2     12-15  Mg     2.0     12-15    TPro  6.3  /  Alb  3.4  /  TBili  0.8  /  DBili  x   /  AST  56<H>  /  ALT  21  /  AlkPhos  96  12-15    PT/INR - ( 14 Dec 2019 00:31 )   PT: 11.0 sec;   INR: 0.97 ratio         PTT - ( 14 Dec 2019 00:31 )  PTT:22.6 sec      RADIOLOGY & ADDITIONAL TESTS:  Studies reviewed.

## 2019-12-15 NOTE — PROGRESS NOTE ADULT - ASSESSMENT
50 yo M with hx of ESRD on HD, s/p DDRT on 12/8, elevated SCr on HD using AVF, s/p PLEX on 12/12 due to concern for TMA (drop in platelet counts 200s-->40s, schistocytes on PB, elevated , hapto <20, Tbili 1.3,). [id number]/[id number] panel pending results.    Pt was on prograf (D/Tanvir on 12/12) which could cause drug induced TMA. Renal biopsy on 12/13- pending results. s/p PLT transfusion prior to biopsy, 1 unit PRBC on 12/13,  received HD on 12/14.    Platelet count 158K-->128 today, hgb stable 8.0, -->321, Tbili 0.8 today, hapto 72 (12/14). Patient oliguric with blood drainage.    Discussed with Nephrology and Tsplt teams, given the drop in plt count, will proceed with PLEX today. [facility] nurse notified.    PLEX with 3600ml plasma as replacement solution , 97% fluid balance.    Patient reports generalized itching with last procedure, will premedicate with Benadryl 50mg IV.    To reevaluate on 12/16, pending biopsy results re- decision on Eculizumab.

## 2019-12-15 NOTE — PROGRESS NOTE ADULT - PROBLEM SELECTOR PLAN 1
Patient s/p DDRT on 12/8/2019. Remains oliguric but electrolytes stable. Planned for HD today. Patient's Envarsus discontinued, continued on Cellcept 1 gm BID, and steroid taper. Patient on antibiotic prophylaxis with Valcyte, Bactrim, and Nystatin. Concern for possible TMA, PS showing multiple shistocytes. Received kidney biopsy on 12/13. Started Belatacept on 12/13. Plan for TPE today with FFP repletion  Eculizumab if biopsy confirms TMA

## 2019-12-15 NOTE — PROGRESS NOTE ADULT - SUBJECTIVE AND OBJECTIVE BOX
Patient is a 49y old  Male who presents with a chief complaint of DDRT (15 Dec 2019 09:37)    Interval events: Patient seen and examined. No acute events overnight. Denies any complaints. Platelet count 158K-->128 today, hgb stable 8.0, -->321, Tbili 0.8 today, hapto 72 (12/14). Patient oliguric (bloody). Received HD on 12/14.      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  Carpal tunnel syndrome  Brain cyst: had MRI recently- now gone  Anemia  Gastric ulcer: Long time ago  Rheumatoid arthritis  Breakdown (mechanical) of penile (implanted) prosthesis, sequela  End-stage renal disease (ESRD): PERMACATH PLACEMENT  Abscess of left buttock: s/p I &amp; D  AV fistula: placement left lower arm  S/P cystoscopy: stent placement &amp; removal for kidney stones, lithiotripsy  s/p Lt Carpal tunnel: 2011      MEDICATIONS  (STANDING):  allopurinol 100 milliGRAM(s) Oral daily  brimonidine 0.2% Ophthalmic Solution 1 Drop(s) Left EYE three times a day  chlorhexidine 4% Liquid 1 Application(s) Topical <User Schedule>  dextrose 5%. 1000 milliLiter(s) (50 mL/Hr) IV Continuous <Continuous>  dextrose 50% Injectable 12.5 Gram(s) IV Push once  dextrose 50% Injectable 25 Gram(s) IV Push once  dextrose 50% Injectable 25 Gram(s) IV Push once  dorzolamide 2% Ophthalmic Solution 1 Drop(s) Left EYE three times a day  famotidine    Tablet 20 milliGRAM(s) Oral daily  gabapentin 100 milliGRAM(s) Oral two times a day  hydrALAZINE 50 milliGRAM(s) Oral two times a day  insulin lispro (HumaLOG) corrective regimen sliding scale   SubCutaneous three times a day before meals  latanoprost 0.005% Ophthalmic Solution 1 Drop(s) Left EYE at bedtime  metoprolol tartrate 25 milliGRAM(s) Oral two times a day  mycophenolate mofetil 1 Gram(s) Oral every 12 hours  nystatin    Suspension [id number] Unit(s) Swish and Swallow four times a day  predniSONE   Tablet 60 milliGRAM(s) Oral daily  sodium chloride 0.9% lock flush 3 milliLiter(s) IV Push every 8 hours  trimethoprim   80 mG/sulfamethoxazole 400 mG 1 Tablet(s) Oral daily  valGANciclovir 450 milliGRAM(s) Oral <User Schedule>    MEDICATIONS  (PRN):  acetaminophen   Tablet .. 650 milliGRAM(s) Oral every 6 hours PRN Mild Pain (1 - 3)  dextrose 40% Gel 15 Gram(s) Oral once PRN Blood Glucose LESS THAN 70 milliGRAM(s)/deciliter  glucagon  Injectable 1 milliGRAM(s) IntraMuscular once PRN Glucose LESS THAN 70 milligrams/deciliter  lidocaine 2% Jelly 10 milliLiter(s) IntraUrethral three times a day PRN Ureteral irritation  ondansetron Injectable 4 milliGRAM(s) IV Push every 6 hours PRN Nausea and/or Vomiting  traMADol 25 milliGRAM(s) Oral every 4 hours PRN Moderate Pain (4 - 6)  traMADol 50 milliGRAM(s) Oral every 6 hours PRN Severe Pain (7 - 10)      Allergies  coconut (Anaphylaxis)  morphine (Pruritus; Rash)      REVIEW OF SYSTEMS:  CONSTITUTIONAL: No weakness, No fevers or chills  EYES/ENT: No visual changes;  No vertigo or throat pain   RESPIRATORY: No cough, wheezing, hemoptysis; No shortness of breath  CARDIOVASCULAR: No chest pain or palpitations  GASTROINTESTINAL: No abdominal or epigastric pain. No nausea, vomiting, or hematemesis; No diarrhea or constipation. No melena or hematochezia  GENITOURINARY: See interval events  MUSCULOSKELETAL: No joint or muscle pain  NEUROLOGICAL: No HA, No numbness or weakness  SKIN: + itching during last plasma exchange, no rash  ENDOCRINE: No heat or cold intolerance      Vital Signs Last 24 Hrs  T(C): 36.9 (15 Dec 2019 09:00), Max: 36.9 (15 Dec 2019 09:00)  T(F): 98.4 (15 Dec 2019 09:00), Max: 98.4 (15 Dec 2019 09:00)  HR: 72 (15 Dec 2019 09:00) (70 - 105)  BP: 110/66 (15 Dec 2019 09:00) (99/50 - 155/90)  RR: 18 (15 Dec 2019 09:00) (18 - 18)  SpO2: 98% (15 Dec 2019 09:00) (96% - 99%)      PHYSICAL EXAM:  General: WN/WD, NAD  Eyes: Anicteriic  ENT: MMM  Respiratory: CTA B/L  CV:+ S1, S2,  RRR  Abdominal: Soft, NT, ND, +BS  Musculoskeletal/Extremities: No atrophy,, no edema, 2+ peripheral pulses  Neurology: A&Ox3, no focal deficits   Skin: No rash, no petechia                            8.0    12.27 )-----------( 128      ( 15 Dec 2019 06:21 )             24.3     Reticulocyte Percent: 6.8 % (12-15 @ 06:21)  Reticulocyte Percent: 6.4 % (12-14 @ 06:55)  Reticulocyte Percent: 7.8 % (12-13 @ 04:53)    Hematocrit: 24.3 % (12-15 @ 06:21)  Hematocrit: 23.5 % (12-14 @ 14:19)  Hematocrit: 22.5 % (12-14 @ 06:55)  Hematocrit: 24.2 % (12-14 @ 00:31)  Hematocrit: 21.8 % (12-13 @ 09:05)  Hematocrit: 22.7 % (12-13 @ 06:00)  Hematocrit: 24.3 % (12-12 @ 11:17)    12-15    134<L>  |  92<L>  |  28<H>  ----------------------------<  105<H>  3.8   |  27  |  4.45<H>    Ca    8.1<L>      15 Dec 2019 06:21  Phos  3.2     12-15  Mg     2.0     12-15    TPro  6.3  /  Alb  3.4  /  TBili  0.8  /  DBili  x   /  AST  56<H>  /  ALT  21  /  AlkPhos  96  12-15      Lactate Dehydrogenase, Serum: 321 U/L (12-15 @ 06:21)  Lactate Dehydrogenase, Serum: 283 U/L (12-14 @ 06:55)  Lactate Dehydrogenase, Serum: 300 U/L (12-13 @ 04:53)    Haptoglobin, Serum: 72 mg/dL (12-14 @ 11:03)  Haptoglobin, Serum: 52 mg/dL (12-13 @ 08:09)  Haptoglobin, Serum: <20 mg/dL (12-12 @ 14:22)    PT/INR - ( 14 Dec 2019 00:31 )   PT: 11.0 sec;   INR: 0.97 ratio    PTT - ( 14 Dec 2019 00:31 )  PTT:22.6 sec  Fibrinogen Assay: 409 mg/dL (12-14 @ 00:31)

## 2019-12-15 NOTE — PROGRESS NOTE ADULT - ASSESSMENT
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 Young, followed by Dr. Raphael, [facility] Oncologist), RA with hand contractures  s/p DDRT on 12/8/19 complicated by DGF requiring HD now with elevated LDH, thrombocytopenia and + schistocytes on smear concerning for TMA.     s/p DDRT:  - Delayed graft function requiring HD. Oliguric.  HD 12/12 and 12/14  - Elevated LDH and Thrombocytopenia, + Schistocytes all concerning for TMA  - PLEX #1 12/12. PLEX #2 today  - Appreciate Heme consult.  FU ADAMTS, TMA Complement panel sent 12/12 ([facility]) and  aHUS Genetic susceptibility panel today ( [facility])  - s/p Renal transplant bx 12/13, awaiting path results  - strict I&Os (JPx1)  - On PRN Tylenol and tramadol for pain  - Immunosuppression: Hold Envarsus and level checks.  Continue MMF 1/1.  Prednisone 1mg/kg (60mg) daily until [id number] is resulted as per Heme recommendations, received Belatacept 12/13   - PPX: Nystatin, bactrim, Valcyte renally dosed BIW      HTN (Hypertension)  -well controlled  -Continue Metoprolol and hydralazine

## 2019-12-15 NOTE — PROGRESS NOTE ADULT - SUBJECTIVE AND OBJECTIVE BOX
Transplant Surgery - Multidisciplinary Rounds  --------------------------------------------------------------  DDRT     Date: 12/8/2019      POD 7    Present:  Patient seen and examined with Dr. Poe and MAEVE Sun during am rounds. Disciplines not in attendance will be notified of the plan.    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 Young, followed by Dr. Raphael, [facility] Oncologist), RA with hand contractures, now s/p DDRT on 12/8/19 (ureteral stent sutured to martinez)     Last colonoscopy: clear 2017  Onc: cleared by [place] 2019 (low grade NET, risk of HD greater than recurrence of low grade NET, recent PET/MRI neg)    Recipient info:   CPRA:    o%  ABO:      A+  CMVAb:  Positive  EBVIgG Status:  Positive  Last HD:  12/7/2019    Donor ID:  [id number]  Match: [id number]  OPO: NYRT  Age:  49  ABO:  A  KDPI 81%  COD:  Anoxia  X Clamp Time:  12/7/2019 1538.  Medical Hx: DM, HTN, HLD, obesity BMI 53, CAD, CABAGX3  Terminal Cr:  1/1.8/1.7  CMV- Neg EBV- Neg    OR:    DDRT to right iliac fossa. Simulect induction. Two arteries anastomosed to a single cuff on the back table. One vein, one ureter. Ureteral anastomosis performed over a double J stent, which was sutured to the martinez.   Cold ischemia time 25.5 hours.    Interval Events:    - POD 7 s/p DDRT c/b DGF requiring HD  - s/p renal transplant bx yesterday ( to r/o TMA), awaiting path results)  - Hematology following: received PLEX 12/12  - Martinez with ureteral stent removed this AM. Passed TOV with continued oliguria with hematuria    Potential Discharge date: Pending clinical improvement     Education: Medications and plan of care    Plan of care: See below      MEDICATIONS  (STANDING):  allopurinol 100 milliGRAM(s) Oral daily  brimonidine 0.2% Ophthalmic Solution 1 Drop(s) Left EYE three times a day  chlorhexidine 4% Liquid 1 Application(s) Topical <User Schedule>  dextrose 5%. 1000 milliLiter(s) (50 mL/Hr) IV Continuous <Continuous>  dextrose 50% Injectable 12.5 Gram(s) IV Push once  dextrose 50% Injectable 25 Gram(s) IV Push once  dextrose 50% Injectable 25 Gram(s) IV Push once  dorzolamide 2% Ophthalmic Solution 1 Drop(s) Left EYE three times a day  famotidine    Tablet 20 milliGRAM(s) Oral daily  gabapentin 100 milliGRAM(s) Oral two times a day  hydrALAZINE 50 milliGRAM(s) Oral two times a day  insulin lispro (HumaLOG) corrective regimen sliding scale   SubCutaneous three times a day before meals  latanoprost 0.005% Ophthalmic Solution 1 Drop(s) Left EYE at bedtime  metoprolol tartrate 25 milliGRAM(s) Oral two times a day  mycophenolate mofetil 1 Gram(s) Oral every 12 hours  nystatin    Suspension [id number] Unit(s) Swish and Swallow four times a day  predniSONE   Tablet 60 milliGRAM(s) Oral daily  sodium chloride 0.9% lock flush 3 milliLiter(s) IV Push every 8 hours  trimethoprim   80 mG/sulfamethoxazole 400 mG 1 Tablet(s) Oral daily  valGANciclovir 450 milliGRAM(s) Oral <User Schedule>    MEDICATIONS  (PRN):  acetaminophen   Tablet .. 650 milliGRAM(s) Oral every 6 hours PRN Mild Pain (1 - 3)  dextrose 40% Gel 15 Gram(s) Oral once PRN Blood Glucose LESS THAN 70 milliGRAM(s)/deciliter  glucagon  Injectable 1 milliGRAM(s) IntraMuscular once PRN Glucose LESS THAN 70 milligrams/deciliter  lidocaine 2% Jelly 10 milliLiter(s) IntraUrethral three times a day PRN Ureteral irritation  ondansetron Injectable 4 milliGRAM(s) IV Push every 6 hours PRN Nausea and/or Vomiting  traMADol 25 milliGRAM(s) Oral every 4 hours PRN Moderate Pain (4 - 6)  traMADol 50 milliGRAM(s) Oral every 6 hours PRN Severe Pain (7 - 10)      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  Carpal tunnel syndrome  Brain cyst: had MRI recently- now gone  Anemia  Gastric ulcer: Long time ago  Rheumatoid arthritis  Breakdown (mechanical) of penile (implanted) prosthesis, sequela  End-stage renal disease (ESRD): PERMACATH PLACEMENT  Abscess of left buttock: s/p I &amp; D  AV fistula: placement left lower arm  S/P cystoscopy: stent placement &amp; removal for kidney stones, lithiotripsy  s/p Lt Carpal tunnel: 2011      Vital Signs Last 24 Hrs  T(C): 36.9 (15 Dec 2019 09:00), Max: 36.9 (15 Dec 2019 09:00)  T(F): 98.4 (15 Dec 2019 09:00), Max: 98.4 (15 Dec 2019 09:00)  HR: 72 (15 Dec 2019 09:00) (70 - 105)  BP: 127/73 (15 Dec 2019 11:28) (99/50 - 155/90)  BP(mean): --  RR: 18 (15 Dec 2019 09:00) (18 - 18)  SpO2: 98% (15 Dec 2019 09:00) (96% - 99%)    I&O's Summary    14 Dec 2019 07:01  -  15 Dec 2019 07:00  --------------------------------------------------------  IN: 0 mL / OUT: 1505 mL / NET: -1505 mL                              8.0    12.27 )-----------( 128      ( 15 Dec 2019 06:21 )             24.3     12-15    134<L>  |  92<L>  |  28<H>  ----------------------------<  105<H>  3.8   |  27  |  4.45<H>    Ca    8.1<L>      15 Dec 2019 06:21  Phos  3.2     12-15  Mg     2.0     12-15    TPro  6.3  /  Alb  3.4  /  TBili  0.8  /  DBili  x   /  AST  56<H>  /  ALT  21  /  AlkPhos  96  12-15    Tacrolimus (), Serum: <2.0 ng/mL (12-15 @ 08:50)        GI PCR Panel, Stool (collected 12-14-19 @ 15:05)  Source: .Stool moriah jett  Final Report (12-14-19 @ 18:05):    GI PCR Results: NOT detected    *******Please Note:*******    GI panel PCR evaluates for:    Campylobacter, Plesiomonas shigelloides, Salmonella,    Vibrio, Yersinia enterocolitica, Enteroaggregative    Escherichia coli (EAEC), Enteropathogenic E.coli (EPEC),    Enterotoxigenic E. coli (ETEC) lt/st, Shiga-like    toxin-producing E. coli (STEC) stx1/stx2,    Shigella/ Enteroinvasive E. coli (EIEC), Cryptosporidium,    Cyclospora cayetanensis, Entamoeba histolytica,    Giardia lamblia, Adenovirus F 40/41, Astrovirus,    Norovirus GI/GII, Rotavirus A, Sapovirus    Culture - Nose (collected 12-10-19 @ 17:21)  Source: .Nose Nose  Final Report (12-13-19 @ 18:51):    Few Methicillin Sensitive Staph aureus "This can represent normal nasal    carriage.  PCR is more sensitive for identifying MRSA/MSSA carriage"              REVIEW OF SYSTEMS  --------------------------------------------------------------------------------  Gen: No weight changes, fatigue, fevers/chills, + weakness  Skin: No rashes  Head/Eyes/Ears/Mouth: No headache; Normal hearing; Normal vision w/o blurriness; No sinus pain/discomfort, sore throat  Respiratory: No dyspnea, cough, wheezing, hemoptysis  CV: No chest pain, PND, orthopnea  GI: No abdominal pain, constipation, nausea, vomiting, melena, hematochezia. no diarrhea  :  No increased frequency, dysuria, hematuria, nocturia  MSK: No joint pain/swelling; no back pain; no edema  Neuro: No dizziness/lightheadedness, weakness, seizures, numbness, tingling  Heme: No easy bruising or bleeding  Endo: No heat/cold intolerance  Psych: No significant nervousness, anxiety, stress, depression  All other systems were reviewed and are negative, except as noted.      PHYSICAL EXAM:  Constitutional: Well developed / well nourished  Eyes: Anicteric, PERRLA  ENMT: nc/at  Neck: Supple  Respiratory: CTA B/L  Cardiovascular: RRR  Gastrointestinal: Soft abdomen, ND. mild incisional TTP. incision c/d/i. JPx1 minimal ss.    Genitourinary:  Martinez in place  Extremities: SCD's in place and working bilaterally  Vascular: Palpable dp pulses bilaterally  Neurological: A&O x3  Skin: no new lesions, ulcerations  Musculoskeletal: Moving all extremities  Psychiatric: Responsive

## 2019-12-15 NOTE — PROGRESS NOTE ADULT - SUBJECTIVE AND OBJECTIVE BOX
[facility] DIVISION OF KIDNEY DISEASES AND HYPERTENSION -- FOLLOW UP NOTE  --------------------------------------------------------------------------------    24 hour events/subjective: Patient seen and examined at the bedside. Pt denies any complaints, no fever/chills, nausea, vomiting. Plt dropping today from 156->128k. LDH increased today. Received HD yest.         PAST HISTORY  --------------------------------------------------------------------------------  No significant changes to PMH, PSH, FHx, SHx, unless otherwise noted    ALLERGIES & MEDICATIONS  --------------------------------------------------------------------------------  Allergies    coconut (Anaphylaxis)  morphine (Pruritus; Rash)    Intolerances      Standing Inpatient Medications  allopurinol 100 milliGRAM(s) Oral daily  brimonidine 0.2% Ophthalmic Solution 1 Drop(s) Left EYE three times a day  chlorhexidine 4% Liquid 1 Application(s) Topical <User Schedule>  dextrose 5%. 1000 milliLiter(s) IV Continuous <Continuous>  dextrose 50% Injectable 12.5 Gram(s) IV Push once  dextrose 50% Injectable 25 Gram(s) IV Push once  dextrose 50% Injectable 25 Gram(s) IV Push once  dorzolamide 2% Ophthalmic Solution 1 Drop(s) Left EYE three times a day  famotidine    Tablet 20 milliGRAM(s) Oral daily  gabapentin 100 milliGRAM(s) Oral two times a day  hydrALAZINE 50 milliGRAM(s) Oral two times a day  insulin lispro (HumaLOG) corrective regimen sliding scale   SubCutaneous three times a day before meals  latanoprost 0.005% Ophthalmic Solution 1 Drop(s) Left EYE at bedtime  metoprolol tartrate 25 milliGRAM(s) Oral two times a day  mycophenolate mofetil 1 Gram(s) Oral every 12 hours  nystatin    Suspension [id number] Unit(s) Swish and Swallow four times a day  predniSONE   Tablet 60 milliGRAM(s) Oral daily  sodium chloride 0.9% lock flush 3 milliLiter(s) IV Push every 8 hours  trimethoprim   80 mG/sulfamethoxazole 400 mG 1 Tablet(s) Oral daily  valGANciclovir 450 milliGRAM(s) Oral <User Schedule>    PRN Inpatient Medications  acetaminophen   Tablet .. 650 milliGRAM(s) Oral every 6 hours PRN  dextrose 40% Gel 15 Gram(s) Oral once PRN  glucagon  Injectable 1 milliGRAM(s) IntraMuscular once PRN  lidocaine 2% Jelly 10 milliLiter(s) IntraUrethral three times a day PRN  ondansetron Injectable 4 milliGRAM(s) IV Push every 6 hours PRN  traMADol 25 milliGRAM(s) Oral every 4 hours PRN  traMADol 50 milliGRAM(s) Oral every 6 hours PRN      REVIEW OF SYSTEMS  --------------------------------------------------------------------------------  Gen: No lethargy  Respiratory: No dyspnea  CV: No chest pain  GI: No abdominal pain/ diarrhea   MSK: No edema  Neuro: No dizziness  Heme: No bleeding    All other systems were reviewed and are negative, except as noted.      >>> <<<    VITALS/PHYSICAL EXAM  --------------------------------------------------------------------------------  T(C): 36.9 (12-15-19 @ 09:00), Max: 36.9 (12-15-19 @ 09:00)  HR: 72 (12-15-19 @ 09:00) (70 - 105)  BP: 110/66 (12-15-19 @ 09:00) (99/50 - 155/90)  RR: 18 (12-15-19 @ 09:00) (18 - 18)  SpO2: 98% (12-15-19 @ 09:00) (96% - 99%)  Wt(kg): --        12-14-19 @ 07:01  -  12-15-19 @ 07:00  --------------------------------------------------------  IN: 0 mL / OUT: 1505 mL / NET: -1505 mL      Physical Exam:    	Gen: NAD, well-appearing  	HEENT: Anicteric   	Pulm: CTA B/L  	CV: RRR, S1S2; no rub  	Abd: +BS, soft, nontender/nondistended  	Transplant: No tenderness, swelling       	: No suprapubic tenderness  	MSK: Warm,  no edema  	Neuro: Awake      	Vascular Access:      LABS/STUDIES  --------------------------------------------------------------------------------              8.0    12.27 >-----------<  128      [12-15-19 @ 06:21]              24.3     134  |  92  |  28  ----------------------------<  105      [12-15-19 @ 06:21]  3.8   |  27  |  4.45        Ca     8.1     [12-15-19 @ 06:21]      Mg     2.0     [12-15-19 @ 06:21]      Phos  3.2     [12-15-19 @ 06:21]    TPro  6.3  /  Alb  3.4  /  TBili  0.8  /  DBili  x   /  AST  56  /  ALT  21  /  AlkPhos  96  [12-15-19 @ 06:21]    PT/INR: PT 11.0 , INR 0.97       [12-14-19 @ 00:31]  PTT: 22.6       [12-14-19 @ 00:31]          [12-15-19 @ 06:21]    Creatinine Trend:  SCr 4.45 [12-15 @ 06:21]  SCr 5.70 [12-14 @ 06:55]  SCr 4.35 [12-13 @ 04:53]  SCr 6.38 [12-12 @ 06:30]  SCr 4.92 [12-11 @ 06:24]        HbA1c 5.0      [12-11-19 @ 08:21]    HBsAg Nonreact      [12-12-19 @ 22:37]  HCV 0.15, Nonreact      [12-12-19 @ 22:37]

## 2019-12-15 NOTE — PROGRESS NOTE ADULT - ASSESSMENT
48 year old male with PMH ESRD on HD T/Th/Sat via L arm fistula since Aug 2013 (at [facility],) s/p DDRT on 12/8/2019 complicated by DGF and now concern for possible TMA.    Recipient info:   CPRA:    o%  ABO:      A+  CMVAb:  Positive  EBVIgG Status:  Positive  Last HD:  12/7/2019    Donor ID:  [id number]  Match: [id number]  OPO: NYRT  Age:  49  ABO:  A  KDPI 81%  COD:  Anoxia  X Clamp Time:  12/7/2019 1538.  Medical Hx: DM, HTN, HLD, obesity BMI 53, CAD, CABAGX3  Terminal Cr:  1/1.8/1.7  CMV- Neg EBV- Neg

## 2019-12-15 NOTE — PROGRESS NOTE ADULT - ASSESSMENT
49 year old male with ESRD on HD  a/p kidney transplant on 12/8, presents for potential DDRT was admitted for elective kidney transplant. Hematology was consulted for elevated LHD and new thrombocytopenia.      # TMA   Smear on 12/12 shows average >3 schistocytes/HPF. LDH was elevated and hpt was low initially. Hgb is chronically low likely 2/2 ESRD. Plt was in 200s on admission and now 40s. Unable to assess kidney function due to ESRD and recent transplant. TTP cannot completely ruled out although it is less likely clinically. Pt was on tacrolimus, which could cause drug induced TMA. Tacro was discontinued on 12/12. s/p PLEX on 12/12. Hpt was normal on 12/13.   -Daily CBC with diff, CMP, Hapto, LDH, retic daily  - BÁRBARA TS 13 pending.  -Continue Prednisone 1mg/kg daily until BÁRBARA TS 13 comes back  -F/U kidney Bx result done on 12/13  To restart PLEX today for down trending platelets and rising LDH, as per renal.

## 2019-12-16 LAB
ALBUMIN SERPL ELPH-MCNC: 3.5 G/DL — SIGNIFICANT CHANGE UP (ref 3.3–5)
ALP SERPL-CCNC: 74 U/L — SIGNIFICANT CHANGE UP (ref 40–120)
ALT FLD-CCNC: 22 U/L — SIGNIFICANT CHANGE UP (ref 10–45)
ANION GAP SERPL CALC-SCNC: 17 MMOL/L — SIGNIFICANT CHANGE UP (ref 5–17)
AST SERPL-CCNC: 45 U/L — HIGH (ref 10–40)
BASOPHILS # BLD AUTO: 0 K/UL — SIGNIFICANT CHANGE UP (ref 0–0.2)
BASOPHILS NFR BLD AUTO: 0 % — SIGNIFICANT CHANGE UP (ref 0–2)
BILIRUB SERPL-MCNC: 0.7 MG/DL — SIGNIFICANT CHANGE UP (ref 0.2–1.2)
BUN SERPL-MCNC: 50 MG/DL — HIGH (ref 7–23)
CALCIUM SERPL-MCNC: 8.2 MG/DL — LOW (ref 8.4–10.5)
CHLORIDE SERPL-SCNC: 90 MMOL/L — LOW (ref 96–108)
CO2 SERPL-SCNC: 30 MMOL/L — SIGNIFICANT CHANGE UP (ref 22–31)
CREAT SERPL-MCNC: 5.79 MG/DL — HIGH (ref 0.5–1.3)
CULTURE RESULTS: SIGNIFICANT CHANGE UP
EOSINOPHIL # BLD AUTO: 0.23 K/UL — SIGNIFICANT CHANGE UP (ref 0–0.5)
EOSINOPHIL NFR BLD AUTO: 1.8 % — SIGNIFICANT CHANGE UP (ref 0–6)
FIBRINOGEN AG PPP IA-MCNC: 297 MG/DL — SIGNIFICANT CHANGE UP (ref 180–350)
GLUCOSE BLDC GLUCOMTR-MCNC: 127 MG/DL — HIGH (ref 70–99)
GLUCOSE BLDC GLUCOMTR-MCNC: 154 MG/DL — HIGH (ref 70–99)
GLUCOSE BLDC GLUCOMTR-MCNC: 195 MG/DL — HIGH (ref 70–99)
GLUCOSE BLDC GLUCOMTR-MCNC: 196 MG/DL — HIGH (ref 70–99)
GLUCOSE SERPL-MCNC: 121 MG/DL — HIGH (ref 70–99)
HAPTOGLOB SERPL-MCNC: 86 MG/DL — SIGNIFICANT CHANGE UP (ref 34–200)
HCT VFR BLD CALC: 24.1 % — LOW (ref 39–50)
HGB BLD-MCNC: 8 G/DL — LOW (ref 13–17)
LDH SERPL L TO P-CCNC: 224 U/L — SIGNIFICANT CHANGE UP (ref 50–242)
LYMPHOCYTES # BLD AUTO: 2.71 K/UL — SIGNIFICANT CHANGE UP (ref 1–3.3)
LYMPHOCYTES # BLD AUTO: 21 % — SIGNIFICANT CHANGE UP (ref 13–44)
MAGNESIUM SERPL-MCNC: 2 MG/DL — SIGNIFICANT CHANGE UP (ref 1.6–2.6)
MCHC RBC-ENTMCNC: 33.2 GM/DL — SIGNIFICANT CHANGE UP (ref 32–36)
MCHC RBC-ENTMCNC: 33.3 PG — SIGNIFICANT CHANGE UP (ref 27–34)
MCV RBC AUTO: 100.4 FL — HIGH (ref 80–100)
MONOCYTES # BLD AUTO: 0.79 K/UL — SIGNIFICANT CHANGE UP (ref 0–0.9)
MONOCYTES NFR BLD AUTO: 6.1 % — SIGNIFICANT CHANGE UP (ref 2–14)
NEUTROPHILS # BLD AUTO: 9.06 K/UL — HIGH (ref 1.8–7.4)
NEUTROPHILS NFR BLD AUTO: 70.2 % — SIGNIFICANT CHANGE UP (ref 43–77)
PHOSPHATE SERPL-MCNC: 4.2 MG/DL — SIGNIFICANT CHANGE UP (ref 2.5–4.5)
PLATELET # BLD AUTO: 163 K/UL — SIGNIFICANT CHANGE UP (ref 150–400)
POTASSIUM SERPL-MCNC: 3.4 MMOL/L — LOW (ref 3.5–5.3)
POTASSIUM SERPL-SCNC: 3.4 MMOL/L — LOW (ref 3.5–5.3)
PROT SERPL-MCNC: 6.1 G/DL — SIGNIFICANT CHANGE UP (ref 6–8.3)
RBC # BLD: 2.4 M/UL — LOW (ref 4.2–5.8)
RBC # BLD: 2.4 M/UL — LOW (ref 4.2–5.8)
RBC # FLD: 19.2 % — HIGH (ref 10.3–14.5)
RETICS #: 142.1 K/UL — HIGH (ref 25–125)
RETICS/RBC NFR: 5.9 % — HIGH (ref 0.5–2.5)
SODIUM SERPL-SCNC: 137 MMOL/L — SIGNIFICANT CHANGE UP (ref 135–145)
SPECIMEN SOURCE: SIGNIFICANT CHANGE UP
WBC # BLD: 12.9 K/UL — HIGH (ref 3.8–10.5)
WBC # FLD AUTO: 12.9 K/UL — HIGH (ref 3.8–10.5)

## 2019-12-16 PROCEDURE — 99233 SBSQ HOSP IP/OBS HIGH 50: CPT | Mod: GC

## 2019-12-16 PROCEDURE — 99233 SBSQ HOSP IP/OBS HIGH 50: CPT

## 2019-12-16 RX ORDER — ACETAMINOPHEN 500 MG
1000 TABLET ORAL ONCE
Refills: 0 | Status: COMPLETED | OUTPATIENT
Start: 2019-12-16 | End: 2019-12-16

## 2019-12-16 RX ADMIN — Medication 400 MILLIGRAM(S): at 23:17

## 2019-12-16 RX ADMIN — Medication 500000 UNIT(S): at 23:17

## 2019-12-16 RX ADMIN — Medication 1: at 12:08

## 2019-12-16 RX ADMIN — TRAMADOL HYDROCHLORIDE 50 MILLIGRAM(S): 50 TABLET ORAL at 19:48

## 2019-12-16 RX ADMIN — Medication 1 TABLET(S): at 11:24

## 2019-12-16 RX ADMIN — SODIUM CHLORIDE 3 MILLILITER(S): 9 INJECTION INTRAMUSCULAR; INTRAVENOUS; SUBCUTANEOUS at 14:12

## 2019-12-16 RX ADMIN — GABAPENTIN 100 MILLIGRAM(S): 400 CAPSULE ORAL at 05:34

## 2019-12-16 RX ADMIN — Medication 25 MILLIGRAM(S): at 05:32

## 2019-12-16 RX ADMIN — GABAPENTIN 100 MILLIGRAM(S): 400 CAPSULE ORAL at 17:48

## 2019-12-16 RX ADMIN — VALGANCICLOVIR 450 MILLIGRAM(S): 450 TABLET, FILM COATED ORAL at 05:31

## 2019-12-16 RX ADMIN — SODIUM CHLORIDE 3 MILLILITER(S): 9 INJECTION INTRAMUSCULAR; INTRAVENOUS; SUBCUTANEOUS at 23:21

## 2019-12-16 RX ADMIN — Medication 500000 UNIT(S): at 05:33

## 2019-12-16 RX ADMIN — Medication 50 MILLIGRAM(S): at 21:17

## 2019-12-16 RX ADMIN — TRAMADOL HYDROCHLORIDE 25 MILLIGRAM(S): 50 TABLET ORAL at 14:16

## 2019-12-16 RX ADMIN — Medication 60 MILLIGRAM(S): at 05:33

## 2019-12-16 RX ADMIN — BRIMONIDINE TARTRATE 1 DROP(S): 2 SOLUTION/ DROPS OPHTHALMIC at 21:17

## 2019-12-16 RX ADMIN — Medication 25 MILLIGRAM(S): at 17:48

## 2019-12-16 RX ADMIN — Medication 1: at 18:05

## 2019-12-16 RX ADMIN — Medication 500000 UNIT(S): at 17:48

## 2019-12-16 RX ADMIN — FAMOTIDINE 20 MILLIGRAM(S): 10 INJECTION INTRAVENOUS at 11:24

## 2019-12-16 RX ADMIN — TRAMADOL HYDROCHLORIDE 50 MILLIGRAM(S): 50 TABLET ORAL at 09:41

## 2019-12-16 RX ADMIN — MYCOPHENOLATE MOFETIL 1 GRAM(S): 250 CAPSULE ORAL at 05:33

## 2019-12-16 RX ADMIN — DORZOLAMIDE HYDROCHLORIDE 1 DROP(S): 20 SOLUTION/ DROPS OPHTHALMIC at 05:31

## 2019-12-16 RX ADMIN — Medication 100 MILLIGRAM(S): at 11:24

## 2019-12-16 RX ADMIN — SODIUM CHLORIDE 3 MILLILITER(S): 9 INJECTION INTRAMUSCULAR; INTRAVENOUS; SUBCUTANEOUS at 05:51

## 2019-12-16 RX ADMIN — Medication 500000 UNIT(S): at 11:24

## 2019-12-16 RX ADMIN — TRAMADOL HYDROCHLORIDE 25 MILLIGRAM(S): 50 TABLET ORAL at 14:46

## 2019-12-16 RX ADMIN — LATANOPROST 1 DROP(S): 0.05 SOLUTION/ DROPS OPHTHALMIC; TOPICAL at 21:18

## 2019-12-16 RX ADMIN — BRIMONIDINE TARTRATE 1 DROP(S): 2 SOLUTION/ DROPS OPHTHALMIC at 14:16

## 2019-12-16 RX ADMIN — TRAMADOL HYDROCHLORIDE 50 MILLIGRAM(S): 50 TABLET ORAL at 20:35

## 2019-12-16 RX ADMIN — BRIMONIDINE TARTRATE 1 DROP(S): 2 SOLUTION/ DROPS OPHTHALMIC at 05:31

## 2019-12-16 RX ADMIN — DORZOLAMIDE HYDROCHLORIDE 1 DROP(S): 20 SOLUTION/ DROPS OPHTHALMIC at 21:18

## 2019-12-16 RX ADMIN — TRAMADOL HYDROCHLORIDE 50 MILLIGRAM(S): 50 TABLET ORAL at 10:11

## 2019-12-16 RX ADMIN — CHLORHEXIDINE GLUCONATE 1 APPLICATION(S): 213 SOLUTION TOPICAL at 05:34

## 2019-12-16 RX ADMIN — DORZOLAMIDE HYDROCHLORIDE 1 DROP(S): 20 SOLUTION/ DROPS OPHTHALMIC at 14:16

## 2019-12-16 RX ADMIN — MYCOPHENOLATE MOFETIL 1 GRAM(S): 250 CAPSULE ORAL at 17:48

## 2019-12-16 NOTE — PROGRESS NOTE ADULT - SUBJECTIVE AND OBJECTIVE BOX
Denies complaints    Vital Signs Last 24 Hrs  T(C): 36.9 (12-16-19 @ 17:00), Max: 37 (12-16-19 @ 15:38)  T(F): 98.5 (12-16-19 @ 17:00), Max: 98.6 (12-16-19 @ 15:38)  HR: 75 (12-16-19 @ 17:00) (68 - 81)  BP: 134/66 (12-16-19 @ 17:00) (115/63 - 136/74)  RR: 18 (12-16-19 @ 17:00) (18 - 18)  SpO2: 100% (12-16-19 @ 17:00) (98% - 100%)    Card S1S2  Lungs b/l air entry  Abd soft  Extr no edema, AVF patent                                       8.0    12.90 )-----------( 163      ( 16 Dec 2019 06:01 )             24.1     16 Dec 2019 06:01    137    |  90     |  50     ----------------------------<  121    3.4     |  30     |  5.79     Ca    8.2        16 Dec 2019 06:01  Phos  4.2       16 Dec 2019 06:01  Mg     2.0       16 Dec 2019 06:01    TPro  6.1    /  Alb  3.5    /  TBili  0.7    /  DBili  x      /  AST  45     /  ALT  22     /  AlkPhos  74     16 Dec 2019 06:01    LIVER FUNCTIONS - ( 16 Dec 2019 06:01 )  Alb: 3.5 g/dL / Pro: 6.1 g/dL / ALK PHOS: 74 U/L / ALT: 22 U/L / AST: 45 U/L / GGT: x           acetaminophen   Tablet .. 650 milliGRAM(s) Oral every 6 hours PRN  allopurinol 100 milliGRAM(s) Oral daily  brimonidine 0.2% Ophthalmic Solution 1 Drop(s) Left EYE three times a day  chlorhexidine 4% Liquid 1 Application(s) Topical <User Schedule>  dextrose 40% Gel 15 Gram(s) Oral once PRN  dextrose 5%. 1000 milliLiter(s) IV Continuous <Continuous>  dextrose 50% Injectable 12.5 Gram(s) IV Push once  dextrose 50% Injectable 25 Gram(s) IV Push once  dextrose 50% Injectable 25 Gram(s) IV Push once  dorzolamide 2% Ophthalmic Solution 1 Drop(s) Left EYE three times a day  famotidine    Tablet 20 milliGRAM(s) Oral daily  gabapentin 100 milliGRAM(s) Oral two times a day  glucagon  Injectable 1 milliGRAM(s) IntraMuscular once PRN  hydrALAZINE 50 milliGRAM(s) Oral two times a day  insulin lispro (HumaLOG) corrective regimen sliding scale   SubCutaneous three times a day before meals  latanoprost 0.005% Ophthalmic Solution 1 Drop(s) Left EYE at bedtime  lidocaine 2% Jelly 10 milliLiter(s) IntraUrethral three times a day PRN  metoprolol tartrate 25 milliGRAM(s) Oral two times a day  mycophenolate mofetil 1 Gram(s) Oral every 12 hours  nystatin    Suspension [id number] Unit(s) Swish and Swallow four times a day  ondansetron Injectable 4 milliGRAM(s) IV Push every 6 hours PRN  predniSONE   Tablet 60 milliGRAM(s) Oral daily  sodium chloride 0.9% lock flush 3 milliLiter(s) IV Push every 8 hours  traMADol 25 milliGRAM(s) Oral every 4 hours PRN  traMADol 50 milliGRAM(s) Oral every 6 hours PRN  trimethoprim   80 mG/sulfamethoxazole 400 mG 1 Tablet(s) Oral daily  valGANciclovir 450 milliGRAM(s) Oral <User Schedule>    A/P:    ESRD on HD  S/p DDRT on 12/8/2019  DGF  S/p transplant kidney bx 12/13: ATN, focal TMA  S/p PLEX 12/13 and 12/15  Immunosuppresion/transplant management per transplant team  Heme/Onc following  Next HD planned for tomorrow   Heparin free  Will follow

## 2019-12-16 NOTE — PROGRESS NOTE ADULT - SUBJECTIVE AND OBJECTIVE BOX
Transplant Surgery - Multidisciplinary Rounds  --------------------------------------------------------------  DDRT     Date: 12/8/2019      POD 8    Present:   Patient seen with multidisciplinary team including ( Transplant Surgeon: Dr. Poe, Dr. Joy, Dr. Be, Dr. Espinal. Transplant Nephrologist: Dr. Tamayo.  Pharmacist: Debbi Ortiz. NP: Aislinn Hunt  and Surgical Resident Mehdi Bess during am rounds and examined with Dr. Joy.  Disciplines not in attendance will be notified of the plan.     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 Young, followed by Dr. Raphael, [facility] Oncologist), RA with hand contractures, now s/p DDRT on 12/8/19 (ureteral stent sutured to martinez)     Last colonoscopy: clear 2017  Onc: cleared by [facility] 2019 (low grade NET, risk of HD greater than recurrence of low grade NET, recent PET/MRI neg)    Recipient info:   CPRA:    o%  ABO:      A+  CMVAb:  Positive  EBVIgG Status:  Positive  Last HD:  12/7/2019    Donor ID:  [id number]  Match: [id number]  OPO: NYRT  Age:  49  ABO:  A  KDPI 81%  COD:  Anoxia  X Clamp Time:  12/7/2019 1538.  Medical Hx: DM, HTN, HLD, obesity BMI 53, CAD, CABAGX3  Terminal Cr:  1/1.8/1.7  CMV- Neg EBV- Neg    OR:    DDRT to right iliac fossa. Simulect induction. Two arteries anastomosed to a single cuff on the back table. One vein, one ureter. Ureteral anastomosis performed over a double J stent, which was sutured to the martinez.   Cold ischemia time 25.5 hours.    Interval Events:    - Martinez with ureteral stent removed yesterday. 65ml UOP voided.  Less hematuria  - PLEX #2 12/15 tolerated. Platelets improving daily. H/H stable.    <- 321.  Heptoglobin 83  - Renal transplant bx 12/13: 21 glomeruli of which 2 show signs of thrombosis at the vascular pole. Larger vessels do not have overt signs of thrombotic angiopathy. Profound ATN, not much chronic damage although there is moderate arterial sclerosis.   - Belatacept 650mg given 12/13    Potential Discharge date: Pending clinical improvement     Education: Medications and plan of care    Plan of care: See below    MEDICATIONS  (STANDING):  allopurinol 100 milliGRAM(s) Oral daily  brimonidine 0.2% Ophthalmic Solution 1 Drop(s) Left EYE three times a day  chlorhexidine 4% Liquid 1 Application(s) Topical <User Schedule>  dextrose 5%. 1000 milliLiter(s) (50 mL/Hr) IV Continuous <Continuous>  dextrose 50% Injectable 12.5 Gram(s) IV Push once  dextrose 50% Injectable 25 Gram(s) IV Push once  dextrose 50% Injectable 25 Gram(s) IV Push once  dorzolamide 2% Ophthalmic Solution 1 Drop(s) Left EYE three times a day  famotidine    Tablet 20 milliGRAM(s) Oral daily  gabapentin 100 milliGRAM(s) Oral two times a day  hydrALAZINE 50 milliGRAM(s) Oral two times a day  insulin lispro (HumaLOG) corrective regimen sliding scale   SubCutaneous three times a day before meals  latanoprost 0.005% Ophthalmic Solution 1 Drop(s) Left EYE at bedtime  metoprolol tartrate 25 milliGRAM(s) Oral two times a day  mycophenolate mofetil 1 Gram(s) Oral every 12 hours  nystatin    Suspension [id number] Unit(s) Swish and Swallow four times a day  predniSONE   Tablet 60 milliGRAM(s) Oral daily  sodium chloride 0.9% lock flush 3 milliLiter(s) IV Push every 8 hours  trimethoprim   80 mG/sulfamethoxazole 400 mG 1 Tablet(s) Oral daily  valGANciclovir 450 milliGRAM(s) Oral <User Schedule>    MEDICATIONS  (PRN):  acetaminophen   Tablet .. 650 milliGRAM(s) Oral every 6 hours PRN Mild Pain (1 - 3)  dextrose 40% Gel 15 Gram(s) Oral once PRN Blood Glucose LESS THAN 70 milliGRAM(s)/deciliter  glucagon  Injectable 1 milliGRAM(s) IntraMuscular once PRN Glucose LESS THAN 70 milligrams/deciliter  lidocaine 2% Jelly 10 milliLiter(s) IntraUrethral three times a day PRN Ureteral irritation  ondansetron Injectable 4 milliGRAM(s) IV Push every 6 hours PRN Nausea and/or Vomiting  traMADol 25 milliGRAM(s) Oral every 4 hours PRN Moderate Pain (4 - 6)  traMADol 50 milliGRAM(s) Oral every 6 hours PRN Severe Pain (7 - 10)      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  Carpal tunnel syndrome  Brain cyst: had MRI recently- now gone  Anemia  Gastric ulcer: Long time ago  Rheumatoid arthritis  Breakdown (mechanical) of penile (implanted) prosthesis, sequela  End-stage renal disease (ESRD): PERMACATH PLACEMENT  Abscess of left buttock: s/p I &amp; D  AV fistula: placement left lower arm  S/P cystoscopy: stent placement &amp; removal for kidney stones, lithiotripsy  s/p Lt Carpal tunnel: 2011      Vital Signs Last 24 Hrs  T(C): 36.9 (16 Dec 2019 09:00), Max: 37 (15 Dec 2019 14:00)  T(F): 98.4 (16 Dec 2019 09:00), Max: 98.6 (15 Dec 2019 14:00)  HR: 75 (16 Dec 2019 09:00) (68 - 81)  BP: 115/63 (16 Dec 2019 09:00) (115/63 - 143/81)  BP(mean): --  RR: 18 (16 Dec 2019 09:00) (18 - 18)  SpO2: 99% (16 Dec 2019 09:00) (95% - 100%)    I&O's Summary    15 Dec 2019 07:01  -  16 Dec 2019 07:00  --------------------------------------------------------  IN: 925 mL / OUT: 90 mL / NET: 835 mL    16 Dec 2019 07:01  -  16 Dec 2019 10:45  --------------------------------------------------------  IN: 240 mL / OUT: 75 mL / NET: 165 mL                              8.0    12.90 )-----------( 163      ( 16 Dec 2019 06:01 )             24.1     12-16    137  |  90<L>  |  50<H>  ----------------------------<  121<H>  3.4<L>   |  30  |  5.79<H>    Ca    8.2<L>      16 Dec 2019 06:01  Phos  4.2     12-16  Mg     2.0     12-16    TPro  6.1  /  Alb  3.5  /  TBili  0.7  /  DBili  x   /  AST  45<H>  /  ALT  22  /  AlkPhos  74  12-16    Tacrolimus (), Serum: <2.0 ng/mL (12-15 @ 08:50)        Culture - Stool (collected 12-14-19 @ 15:33)  Source: .Stool Feces  Preliminary Report (12-15-19 @ 18:22):    No enteric pathogens to date: Final culture pending    GI PCR Panel, Stool (collected 12-14-19 @ 15:05)  Source: .Stool moriah johnie  Final Report (12-14-19 @ 18:05):    GI PCR Results: NOT detected    *******Please Note:*******    GI panel PCR evaluates for:    Campylobacter, Plesiomonas shigelloides, Salmonella,    Vibrio, Yersinia enterocolitica, Enteroaggregative    Escherichia coli (EAEC), Enteropathogenic E.coli (EPEC),    Enterotoxigenic E. coli (ETEC) lt/st, Shiga-like    toxin-producing E. coli (STEC) stx1/stx2,    Shigella/ Enteroinvasive E. coli (EIEC), Cryptosporidium,    Cyclospora cayetanensis, Entamoeba histolytica,    Giardia lamblia, Adenovirus F 40/41, Astrovirus,    Norovirus GI/GII, Rotavirus A, Sapovirus    Culture - Nose (collected 12-10-19 @ 17:21)  Source: .Nose Nose  Final Report (12-13-19 @ 18:51):    Few Methicillin Sensitive Staph aureus "This can represent normal nasal    carriage.  PCR is more sensitive for identifying MRSA/MSSA carriage"        REVIEW OF SYSTEMS  --------------------------------------------------------------------------------  Gen: No weight changes, fatigue, fevers/chills, + weakness  Skin: No rashes  Head/Eyes/Ears/Mouth: No headache; Normal hearing; Normal vision w/o blurriness; No sinus pain/discomfort, sore throat  Respiratory: No dyspnea, cough, wheezing, hemoptysis  CV: No chest pain, PND, orthopnea  GI: No abdominal pain, constipation, nausea, vomiting, melena, hematochezia. no diarrhea  :  No increased frequency, dysuria, hematuria, nocturia  MSK: No joint pain/swelling; no back pain; no edema  Neuro: No dizziness/lightheadedness, weakness, seizures, numbness, tingling  Heme: No easy bruising or bleeding  Endo: No heat/cold intolerance  Psych: No significant nervousness, anxiety, stress, depression  All other systems were reviewed and are negative, except as noted.      PHYSICAL EXAM:  Constitutional: Well developed / well nourished  Eyes: Anicteric, PERRLA  ENMT: nc/at  Neck: Supple  Respiratory: CTA B/L  Cardiovascular: RRR  Gastrointestinal: Soft abdomen, ND. mild incisional TTP. Incision c/d/i. JPx1 minimal ss.    Genitourinary:  voiding  Extremities: SCD's in place and working bilaterally  Vascular: Palpable dp pulses bilaterally  Neurological: A&O x3  Skin: no new lesions, ulcerations  Musculoskeletal: Moving all extremities  Psychiatric: Responsive

## 2019-12-16 NOTE — CHART NOTE - NSCHARTNOTEFT_GEN_A_CORE
Nutrition follow up     Pt seen for post kidney transplant recipient nutrition follow up per department protocol.     Hospital course as per chart: Pt 50 y/o M with PMH: ESRD on HD; S/P DDRT on (12/08/19) complicated by DGF requiring HD; renal biopsy report showed ATN with focal TMA; over weekend HD (12/14), received PLEX plasmapheresis on (12/15), Kennedy/stent removed.      Source: Patient [x]    Family [ ]     other [x]; Medical record    Pt reports good appetite and PO intake, states consuming >75% of meals and feeling "hungry all the time" due to steroids. Denies difficulty chewing/swallowing. Pt denies nausea, vomiting, diarrhea, or constipation, last BM today (12/16). Urine output as per flow sheets (12/14) 95 ml -> (12/15) 65 ml -> (12/16) 75 ml. Provided education on post transplant nutrition therapy and food safety guidelines for transplant recipients with booklet before discharge.     Diet: renal     Enteral /Parenteral Nutrition: n/a    Current Weight: (12/08) 141.3 pounds -> (12/12) 146.1 pounds -> (12/16) 148.1 pounds -?accuracy of weight fluctuations as pt S/P DDRT (12/08) with DGF on HD, will continue to monitor.   % Weight Change: n/a    Pertinent Medications: MEDICATIONS  (STANDING):  allopurinol 100 milliGRAM(s) Oral daily  brimonidine 0.2% Ophthalmic Solution 1 Drop(s) Left EYE three times a day  chlorhexidine 4% Liquid 1 Application(s) Topical <User Schedule>  dextrose 5%. 1000 milliLiter(s) (50 mL/Hr) IV Continuous <Continuous>  dextrose 50% Injectable 12.5 Gram(s) IV Push once  dextrose 50% Injectable 25 Gram(s) IV Push once  dextrose 50% Injectable 25 Gram(s) IV Push once  dorzolamide 2% Ophthalmic Solution 1 Drop(s) Left EYE three times a day  famotidine    Tablet 20 milliGRAM(s) Oral daily  gabapentin 100 milliGRAM(s) Oral two times a day  hydrALAZINE 50 milliGRAM(s) Oral two times a day  insulin lispro (HumaLOG) corrective regimen sliding scale   SubCutaneous three times a day before meals  latanoprost 0.005% Ophthalmic Solution 1 Drop(s) Left EYE at bedtime  metoprolol tartrate 25 milliGRAM(s) Oral two times a day  mycophenolate mofetil 1 Gram(s) Oral every 12 hours  nystatin    Suspension [id number] Unit(s) Swish and Swallow four times a day  predniSONE   Tablet 60 milliGRAM(s) Oral daily  sodium chloride 0.9% lock flush 3 milliLiter(s) IV Push every 8 hours  trimethoprim   80 mG/sulfamethoxazole 400 mG 1 Tablet(s) Oral daily  valGANciclovir 450 milliGRAM(s) Oral <User Schedule>    MEDICATIONS  (PRN):  acetaminophen   Tablet .. 650 milliGRAM(s) Oral every 6 hours PRN Mild Pain (1 - 3)  dextrose 40% Gel 15 Gram(s) Oral once PRN Blood Glucose LESS THAN 70 milliGRAM(s)/deciliter  glucagon  Injectable 1 milliGRAM(s) IntraMuscular once PRN Glucose LESS THAN 70 milligrams/deciliter  lidocaine 2% Jelly 10 milliLiter(s) IntraUrethral three times a day PRN Ureteral irritation  ondansetron Injectable 4 milliGRAM(s) IV Push every 6 hours PRN Nausea and/or Vomiting  traMADol 25 milliGRAM(s) Oral every 4 hours PRN Moderate Pain (4 - 6)  traMADol 50 milliGRAM(s) Oral every 6 hours PRN Severe Pain (7 - 10)    Pertinent Labs: (12/16) Glu 121 mg/dL<H> K+ 3.4 mmol/L<L> Cr  5.79 mg/dL<H> BUN 50 mg/dL<H>   Finger sticks: (12/16) 127 (12/15) 138 - 200   (12/11) [id number] 5.0 %    Skin: no noted pressure injuries as per documentation.   No noted edema as per flow sheets at this time (previously with +1 jolynn. ankle edema).     Estimated Needs:   [x] no change since previous assessment  [ ] recalculated:     Previous Nutrition Diagnoses:   [x] Increased Nutrient Needs   [x] Food & Nutrition Related Knowledge Deficit     Nutrition Diagnoses are [x] ongoing - being addressed with PO intake encouragement and nutrition therapy education.     New Nutrition Diagnosis: [x] not applicable    Interventions:     1. Recommend continue renal diet upon discharge. Recommend follow up visit with Transplant MD and outpatient RD for dietary modifications as warranted.   2. Provided education on post transplant nutrition therapy and food safety guidelines for transplant recipients before discharge. Discussed importance of thoroughly washing all fresh fruits/vegetables, importance of avoiding uncooked/raw/unpasteurized foods, avoiding pre-made deli/buffet/salad bar meals. Foods recommended as healthy well balanced diet and importance of adequate protein intakes for proper post-surgical healing discussed. Reviewed recommendations to avoid grapefruit, pomegranate and star fruit while taking immunosuppressant medication. Reviewed recommendations for moderate intake of sodium and carbohydrates with transplant medications. Reviewed effect of steroids on BG levels and importance of limiting concentrated sweets. Pt was receptive and expressed understanding. All questions answered. Provided nutrition handout: USDA Food Safety for Transplant Recipients booklet.   3. Continue to obtain weights to identify changes if any.     Monitoring and Evaluation:     [x] PO intake [x] Tolerance to diet prescription [x] weights [x] follow up per protocol    [x] other: urine output; needs for further education     RD remains available.  Mari Morse MS RDN CDN #[phone].

## 2019-12-16 NOTE — PROGRESS NOTE ADULT - ASSESSMENT
50 yo M with hx of ESRD on HD, s/p DDRT on 12/8, elevated SCr on HD using AVF, s/p PLEX on 12/12 due to concern for TMA (drop in platelet counts 200s-->40s, schistocytes on PB, elevated , hapto <20, Tbili 1.3,). [id number] panel pending results.    - renal biopsy 12/13 report showed ATN w/ focal TMA 48 yo M with hx of ESRD on HD, s/p DDRT on 12/8, elevated SCr on HD using AVF, s/p PLEX on 12/12 due to concern for TMA (drop in platelet counts 200s-->40s, schistocytes on PB, elevated , hapto <20, Tbili 1.3,). [id number] not suppressed, aHUS panel pending results.    - renal biopsy 12/13 report showed ATN w/ focal TMA

## 2019-12-16 NOTE — PROGRESS NOTE ADULT - ASSESSMENT
49 year old male with ESRD on HD  a/p kidney transplant on 12/8, presents for potential DDRT was admitted for elective kidney transplant. Hematology was consulted for elevated LHD and new thrombocytopenia.      # R/o TMA   Smear on 12/12 showed average >3 schistocytes/HPF. LDH was elevated and hpt was low initially. Hgb is chronically low likely 2/2 ESRD. Plt was in 200s on admission decreased to 40's, but now platelets are normal  - [id number] came back 43.5%, which rules out TTP  - Please discontinue steroids at this time since this is not TTP     - Follow up kidney bx done on 12/13, and follow up with transplant team regarding possible tacrolimus induced TMA and when to restart the medication   - Continue to trend platelets daily, if platelets fall again we will recheck a peripheral smear   - No further indication for PLEX from our standpoint but please touch base with blood bank to follow up their recommendations.    Tori Sierra MD  Hematology Oncology Fellow, PGY-4  [facility] Pager: [phone]/ [facility] Pager: [phone]

## 2019-12-16 NOTE — PROGRESS NOTE ADULT - PROBLEM SELECTOR PLAN 1
Patient s/p DDRT on 12/8/2019 with DGF in setting ATN (2/2 CNI) & TMA on renal biopsy (12/13) and peripheral smear + schistocytes, started on belatacept 12/13 and plasmapheresis 12/14.  Today LDH/Haptoglobin/Hgb within normal range  Remains oliguric but electrolytes stable.    - Planned for HD today, hold off further plasmapheresis will reassess tomorrow  - continue Belatacept, cellcept 1 gm BID, and steroid taper.   - c/w prophylaxis with Valcyte, Bactrim, and Nystatin.  - monitor SCr/electrolytes, strict I/O, daily weight

## 2019-12-16 NOTE — PROGRESS NOTE ADULT - SUBJECTIVE AND OBJECTIVE BOX
INTERVAL History:    Allergies    coconut (Anaphylaxis)  morphine (Pruritus; Rash)    Intolerances        MEDICATIONS  (STANDING):  allopurinol 100 milliGRAM(s) Oral daily  brimonidine 0.2% Ophthalmic Solution 1 Drop(s) Left EYE three times a day  chlorhexidine 4% Liquid 1 Application(s) Topical <User Schedule>  dextrose 5%. 1000 milliLiter(s) (50 mL/Hr) IV Continuous <Continuous>  dextrose 50% Injectable 12.5 Gram(s) IV Push once  dextrose 50% Injectable 25 Gram(s) IV Push once  dextrose 50% Injectable 25 Gram(s) IV Push once  dorzolamide 2% Ophthalmic Solution 1 Drop(s) Left EYE three times a day  famotidine    Tablet 20 milliGRAM(s) Oral daily  gabapentin 100 milliGRAM(s) Oral two times a day  hydrALAZINE 50 milliGRAM(s) Oral two times a day  insulin lispro (HumaLOG) corrective regimen sliding scale   SubCutaneous three times a day before meals  latanoprost 0.005% Ophthalmic Solution 1 Drop(s) Left EYE at bedtime  metoprolol tartrate 25 milliGRAM(s) Oral two times a day  mycophenolate mofetil 1 Gram(s) Oral every 12 hours  nystatin    Suspension 503954 Unit(s) Swish and Swallow four times a day  predniSONE   Tablet 60 milliGRAM(s) Oral daily  sodium chloride 0.9% lock flush 3 milliLiter(s) IV Push every 8 hours  trimethoprim   80 mG/sulfamethoxazole 400 mG 1 Tablet(s) Oral daily  valGANciclovir 450 milliGRAM(s) Oral <User Schedule>    MEDICATIONS  (PRN):  acetaminophen   Tablet .. 650 milliGRAM(s) Oral every 6 hours PRN Mild Pain (1 - 3)  dextrose 40% Gel 15 Gram(s) Oral once PRN Blood Glucose LESS THAN 70 milliGRAM(s)/deciliter  glucagon  Injectable 1 milliGRAM(s) IntraMuscular once PRN Glucose LESS THAN 70 milligrams/deciliter  lidocaine 2% Jelly 10 milliLiter(s) IntraUrethral three times a day PRN Ureteral irritation  ondansetron Injectable 4 milliGRAM(s) IV Push every 6 hours PRN Nausea and/or Vomiting  traMADol 25 milliGRAM(s) Oral every 4 hours PRN Moderate Pain (4 - 6)  traMADol 50 milliGRAM(s) Oral every 6 hours PRN Severe Pain (7 - 10)      Vital Signs Last 24 Hrs  T(C): 36.9 (16 Dec 2019 09:00), Max: 37 (15 Dec 2019 14:00)  T(F): 98.4 (16 Dec 2019 09:00), Max: 98.6 (15 Dec 2019 14:00)  HR: 75 (16 Dec 2019 09:00) (68 - 81)  BP: 115/63 (16 Dec 2019 09:00) (115/63 - 143/81)  BP(mean): --  RR: 18 (16 Dec 2019 09:00) (18 - 18)  SpO2: 99% (16 Dec 2019 09:00) (95% - 100%)    PHYSICAL EXAM:    General: AOX3, no acute distress  EYES: EOMI, PERRLA, conjunctiva and sclera clear  NECK: Supple, No JVD, Normal thyroid  CHEST/LUNG: Clear to auscultation bilaterally; No rales, rhonchi, or wheezing  HEART: Regular rate and rhythm; no murmurs  ABDOMEN: Soft, Nontender. BS+  LYMPH: No lymphadenopathy noted  Extremities: No peripheral edema      LABS:                        8.0    12.90 )-----------( 163      ( 16 Dec 2019 06:01 )             24.1     12-16    137  |  90<L>  |  50<H>  ----------------------------<  121<H>  3.4<L>   |  30  |  5.79<H>    Ca    8.2<L>      16 Dec 2019 06:01  Phos  4.2     12-16  Mg     2.0     12-16    TPro  6.1  /  Alb  3.5  /  TBili  0.7  /  DBili  x   /  AST  45<H>  /  ALT  22  /  AlkPhos  74  12-16            RADIOLOGY & ADDITIONAL STUDIES:    PATHOLOGY: INTERVAL History: No acute events overnight. Patient seen and examined this morning. He feels well and had no acute complaints. No fevers, chills, pain symptoms, abdominal pain or diarrhea. No bleeding issues.     Allergies    coconut (Anaphylaxis)  morphine (Pruritus; Rash)    Intolerances        MEDICATIONS  (STANDING):  allopurinol 100 milliGRAM(s) Oral daily  brimonidine 0.2% Ophthalmic Solution 1 Drop(s) Left EYE three times a day  chlorhexidine 4% Liquid 1 Application(s) Topical <User Schedule>  dextrose 5%. 1000 milliLiter(s) (50 mL/Hr) IV Continuous <Continuous>  dextrose 50% Injectable 12.5 Gram(s) IV Push once  dextrose 50% Injectable 25 Gram(s) IV Push once  dextrose 50% Injectable 25 Gram(s) IV Push once  dorzolamide 2% Ophthalmic Solution 1 Drop(s) Left EYE three times a day  famotidine    Tablet 20 milliGRAM(s) Oral daily  gabapentin 100 milliGRAM(s) Oral two times a day  hydrALAZINE 50 milliGRAM(s) Oral two times a day  insulin lispro (HumaLOG) corrective regimen sliding scale   SubCutaneous three times a day before meals  latanoprost 0.005% Ophthalmic Solution 1 Drop(s) Left EYE at bedtime  metoprolol tartrate 25 milliGRAM(s) Oral two times a day  mycophenolate mofetil 1 Gram(s) Oral every 12 hours  nystatin    Suspension 913425 Unit(s) Swish and Swallow four times a day  predniSONE   Tablet 60 milliGRAM(s) Oral daily  sodium chloride 0.9% lock flush 3 milliLiter(s) IV Push every 8 hours  trimethoprim   80 mG/sulfamethoxazole 400 mG 1 Tablet(s) Oral daily  valGANciclovir 450 milliGRAM(s) Oral <User Schedule>    MEDICATIONS  (PRN):  acetaminophen   Tablet .. 650 milliGRAM(s) Oral every 6 hours PRN Mild Pain (1 - 3)  dextrose 40% Gel 15 Gram(s) Oral once PRN Blood Glucose LESS THAN 70 milliGRAM(s)/deciliter  glucagon  Injectable 1 milliGRAM(s) IntraMuscular once PRN Glucose LESS THAN 70 milligrams/deciliter  lidocaine 2% Jelly 10 milliLiter(s) IntraUrethral three times a day PRN Ureteral irritation  ondansetron Injectable 4 milliGRAM(s) IV Push every 6 hours PRN Nausea and/or Vomiting  traMADol 25 milliGRAM(s) Oral every 4 hours PRN Moderate Pain (4 - 6)  traMADol 50 milliGRAM(s) Oral every 6 hours PRN Severe Pain (7 - 10)      Vital Signs Last 24 Hrs  T(C): 36.9 (16 Dec 2019 09:00), Max: 37 (15 Dec 2019 14:00)  T(F): 98.4 (16 Dec 2019 09:00), Max: 98.6 (15 Dec 2019 14:00)  HR: 75 (16 Dec 2019 09:00) (68 - 81)  BP: 115/63 (16 Dec 2019 09:00) (115/63 - 143/81)  BP(mean): --  RR: 18 (16 Dec 2019 09:00) (18 - 18)  SpO2: 99% (16 Dec 2019 09:00) (95% - 100%)    PHYSICAL EXAM:    General: AOX3, no acute distress  EYES: EOMI, PERRLA, conjunctiva and sclera clear  NECK: Supple, No JVD, Normal thyroid  CHEST/LUNG: Clear to auscultation bilaterally; No rales, rhonchi, or wheezing  HEART: Regular rate and rhythm; no murmurs  ABDOMEN: Soft, Nontender. BS+  LYMPH: No lymphadenopathy noted  Extremities: No peripheral edema      LABS:                        8.0    12.90 )-----------( 163      ( 16 Dec 2019 06:01 )             24.1     12-16    137  |  90<L>  |  50<H>  ----------------------------<  121<H>  3.4<L>   |  30  |  5.79<H>    Ca    8.2<L>      16 Dec 2019 06:01  Phos  4.2     12-16  Mg     2.0     12-16    TPro  6.1  /  Alb  3.5  /  TBili  0.7  /  DBili  x   /  AST  45<H>  /  ALT  22  /  AlkPhos  74  12-16            RADIOLOGY & ADDITIONAL STUDIES:    ADAMTS 13 Activity Reflex (12.12.19 @ 23:49)    [id number] Activity: 43.5: This test was developed and its performance characteristics  determined by [facility]. It has not been cleared or  approved by the Food and Drug Administration.  Performed At:  [facility] [id number]  Leon Garrido MD Ph:[phone]    [id number]: Comment: Severe deficiency of [id number] (less than 10% activity) is a  relatively specific finding in patients with a clinical  diagnosis of either hereditary or acquired thrombotic  thrombocytopenic purpura (TTP). Normal to moderately  reduced CJJWMO52 activity results do not exclude a  diagnosis of TTP. Conditions that could have PSUTMF36  activity greater than 10% include hemolytic uremic  syndrome (HUS), atypical hemolytic uremic syndrome (aHUS),  and other thrombotic microangiopathies associated with  hematopoietic stem cell and solid organ transplantation,  liver disease, DIC, sepsis, pregnancy, or effects of  certain medications (eg, clopidogrel, cyclosporine,  mitomycin C, quinine).  Performed At:  [facility] [id number]  Leon Garrido MD Ph:[phone]        PATHOLOGY:

## 2019-12-16 NOTE — PROGRESS NOTE ADULT - SUBJECTIVE AND OBJECTIVE BOX
[facility] DIVISION OF KIDNEY DISEASES AND HYPERTENSION -- FOLLOW UP NOTE  --------------------------------------------------------------------------------  Chief Complaint:    24 hour events/subjective:  - over weekend HD 12/14, received PLEX plasmapheresis on 12/15, martinez/stent removed  - no overnight events, vitals wnl, total UOP 65 cc (after 6am already made 75 cc)  - pt seen and examined at bedside this morning on HD  - vitals/lab reviewed, noted SCr uptrend 4.4 to 5.79, K 3.4  - renal biopsy report showed ATN w/ focal TMA, reviewed bx w/ pathologist  - plan for HD today       PAST HISTORY  --------------------------------------------------------------------------------  No significant changes to PMH, PSH, FHx, SHx, unless otherwise noted    ALLERGIES & MEDICATIONS  --------------------------------------------------------------------------------  Allergies    coconut (Anaphylaxis)  morphine (Pruritus; Rash)    Intolerances      Standing Inpatient Medications  allopurinol 100 milliGRAM(s) Oral daily  brimonidine 0.2% Ophthalmic Solution 1 Drop(s) Left EYE three times a day  chlorhexidine 4% Liquid 1 Application(s) Topical <User Schedule>  dextrose 5%. 1000 milliLiter(s) IV Continuous <Continuous>  dextrose 50% Injectable 12.5 Gram(s) IV Push once  dextrose 50% Injectable 25 Gram(s) IV Push once  dextrose 50% Injectable 25 Gram(s) IV Push once  dorzolamide 2% Ophthalmic Solution 1 Drop(s) Left EYE three times a day  famotidine    Tablet 20 milliGRAM(s) Oral daily  gabapentin 100 milliGRAM(s) Oral two times a day  hydrALAZINE 50 milliGRAM(s) Oral two times a day  insulin lispro (HumaLOG) corrective regimen sliding scale   SubCutaneous three times a day before meals  latanoprost 0.005% Ophthalmic Solution 1 Drop(s) Left EYE at bedtime  metoprolol tartrate 25 milliGRAM(s) Oral two times a day  mycophenolate mofetil 1 Gram(s) Oral every 12 hours  nystatin    Suspension [id number] Unit(s) Swish and Swallow four times a day  predniSONE   Tablet 60 milliGRAM(s) Oral daily  sodium chloride 0.9% lock flush 3 milliLiter(s) IV Push every 8 hours  trimethoprim   80 mG/sulfamethoxazole 400 mG 1 Tablet(s) Oral daily  valGANciclovir 450 milliGRAM(s) Oral <User Schedule>    PRN Inpatient Medications  acetaminophen   Tablet .. 650 milliGRAM(s) Oral every 6 hours PRN  dextrose 40% Gel 15 Gram(s) Oral once PRN  glucagon  Injectable 1 milliGRAM(s) IntraMuscular once PRN  lidocaine 2% Jelly 10 milliLiter(s) IntraUrethral three times a day PRN  ondansetron Injectable 4 milliGRAM(s) IV Push every 6 hours PRN  traMADol 25 milliGRAM(s) Oral every 4 hours PRN  traMADol 50 milliGRAM(s) Oral every 6 hours PRN      REVIEW OF SYSTEMS  --------------------------------------------------------------------------------  Gen: No fatigue, fevers/chills, weakness  Skin: No rashes  Respiratory: No dyspnea, cough  CV: No chest pain, PND, orthopnea  GI: No abdominal pain, diarrhea, constipation, nausea, vomiting  : No increased frequency, dysuria  MSK: No edema  Neuro: No dizziness/lightheadedness, weakness    All other systems were reviewed and are negative, except as noted.    VITALS/PHYSICAL EXAM  --------------------------------------------------------------------------------  T(C): 36.9 (12-16-19 @ 09:00), Max: 37 (12-15-19 @ 14:00)  HR: 75 (12-16-19 @ 09:00) (68 - 81)  BP: 115/63 (12-16-19 @ 09:00) (115/63 - 143/81)  RR: 18 (12-16-19 @ 09:00) (18 - 18)  SpO2: 99% (12-16-19 @ 09:00) (95% - 100%)  Wt(kg): --        12-15-19 @ 07:01  -  12-16-19 @ 07:00  --------------------------------------------------------  IN: 925 mL / OUT: 90 mL / NET: 835 mL    12-16-19 @ 07:01  -  12-16-19 @ 10:57  --------------------------------------------------------  IN: 240 mL / OUT: 75 mL / NET: 165 mL      Physical Exam:  	Gen: NAD, well-appearing on room air  	HEENT: clear oropharynx  	Pulm: CTA B/L  	CV: RRR, S1S2, no murmur  	Abd: +BS, soft, nondistended, incision c/d/i  	LE: Warm, no edema, RUE AVF +Thrills  	Psych: Normal affect and mood  	Skin: Warm, without rashes  	Vascular access:    LABS/STUDIES  --------------------------------------------------------------------------------              8.0    12.90 >-----------<  163      [12-16-19 @ 06:01]              24.1     137  |  90  |  50  ----------------------------<  121      [12-16-19 @ 06:01]  3.4   |  30  |  5.79        Ca     8.2     [12-16-19 @ 06:01]      iCa    1.05     [12-15 @ 12:44]      Mg     2.0     [12-16-19 @ 06:01]      Phos  4.2     [12-16-19 @ 06:01]    TPro  6.1  /  Alb  3.5  /  TBili  0.7  /  DBili  x   /  AST  45  /  ALT  22  /  AlkPhos  74  [12-16-19 @ 06:01]              [12-16-19 @ 06:01]    Creatinine Trend:  SCr 5.79 [12-16 @ 06:01]  SCr 4.45 [12-15 @ 06:21]  SCr 5.70 [12-14 @ 06:55]  SCr 4.35 [12-13 @ 04:53]  SCr 6.38 [12-12 @ 06:30]        HbA1c 5.0      [12-11-19 @ 08:21]    HBsAg Nonreact      [12-12-19 @ 22:37]  HCV 0.15, Nonreact      [12-12-19 @ 22:37]

## 2019-12-16 NOTE — PROGRESS NOTE ADULT - ASSESSMENT
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 Young, followed by Dr. Raphael, [facility] Oncologist), RA with hand contractures  s/p DDRT on 12/8/19 complicated by DGF requiring HD now with elevated LDH, thrombocytopenia and + schistocytes on smear concerning for TMA.     s/p DDRT:  - Delayed graft function requiring HD. Oliguric. Last HD session 12/14.   - Elevated LDH and Thrombocytopenia,  + Schistocytes all concerning for TMA.  S/P 2 sessions PLEX.  Platelets improving  ? related to CNI  - Hold on PLEX today and trend labs  - ADAMTS 13      43.5  - Appreciate Heme consult. TMA Complement panel sent 12/12 ([facility]) and  aHUS Genetic susceptibility panel today ( [facility])  - FU DSA sent 12/12  - strict I&Os (JPx1)  - On PRN Tylenol and tramadol for pain  - Immunosuppression: DC CNI.  Plan for Belatacept 650mg tomorrow.  Continue MMF 1/1.   - Continue Prednisone 1mg/kg (60mg) daily for now.    - PPX: Nystatin, bactrim, Valcyte renally dosed BIW      HTN (Hypertension)  -well controlled  -Continue Metoprolol and hydralazine

## 2019-12-17 LAB
ALBUMIN SERPL ELPH-MCNC: 3.8 G/DL — SIGNIFICANT CHANGE UP (ref 3.3–5)
ALP SERPL-CCNC: 84 U/L — SIGNIFICANT CHANGE UP (ref 40–120)
ALT FLD-CCNC: 24 U/L — SIGNIFICANT CHANGE UP (ref 10–45)
ANION GAP SERPL CALC-SCNC: 19 MMOL/L — HIGH (ref 5–17)
AST SERPL-CCNC: 38 U/L — SIGNIFICANT CHANGE UP (ref 10–40)
BASOPHILS # BLD AUTO: 0 K/UL — SIGNIFICANT CHANGE UP (ref 0–0.2)
BASOPHILS NFR BLD AUTO: 0 % — SIGNIFICANT CHANGE UP (ref 0–2)
BILIRUB SERPL-MCNC: 0.5 MG/DL — SIGNIFICANT CHANGE UP (ref 0.2–1.2)
BUN SERPL-MCNC: 66 MG/DL — HIGH (ref 7–23)
CALCIUM SERPL-MCNC: 8.4 MG/DL — SIGNIFICANT CHANGE UP (ref 8.4–10.5)
CHLORIDE SERPL-SCNC: 87 MMOL/L — LOW (ref 96–108)
CO2 SERPL-SCNC: 28 MMOL/L — SIGNIFICANT CHANGE UP (ref 22–31)
CREAT SERPL-MCNC: 7.2 MG/DL — HIGH (ref 0.5–1.3)
EOSINOPHIL # BLD AUTO: 0.12 K/UL — SIGNIFICANT CHANGE UP (ref 0–0.5)
EOSINOPHIL NFR BLD AUTO: 0.9 % — SIGNIFICANT CHANGE UP (ref 0–6)
GLUCOSE BLDC GLUCOMTR-MCNC: 125 MG/DL — HIGH (ref 70–99)
GLUCOSE BLDC GLUCOMTR-MCNC: 149 MG/DL — HIGH (ref 70–99)
GLUCOSE BLDC GLUCOMTR-MCNC: 158 MG/DL — HIGH (ref 70–99)
GLUCOSE BLDC GLUCOMTR-MCNC: 163 MG/DL — HIGH (ref 70–99)
GLUCOSE SERPL-MCNC: 99 MG/DL — SIGNIFICANT CHANGE UP (ref 70–99)
HAPTOGLOB SERPL-MCNC: 78 MG/DL — SIGNIFICANT CHANGE UP (ref 34–200)
HCT VFR BLD CALC: 27.5 % — LOW (ref 39–50)
HGB BLD-MCNC: 8.9 G/DL — LOW (ref 13–17)
LDH SERPL L TO P-CCNC: 260 U/L — HIGH (ref 50–242)
LYMPHOCYTES # BLD AUTO: 22.6 % — SIGNIFICANT CHANGE UP (ref 13–44)
LYMPHOCYTES # BLD AUTO: 3.07 K/UL — SIGNIFICANT CHANGE UP (ref 1–3.3)
MAGNESIUM SERPL-MCNC: 1.9 MG/DL — SIGNIFICANT CHANGE UP (ref 1.6–2.6)
MCHC RBC-ENTMCNC: 32.4 GM/DL — SIGNIFICANT CHANGE UP (ref 32–36)
MCHC RBC-ENTMCNC: 32.5 PG — SIGNIFICANT CHANGE UP (ref 27–34)
MCV RBC AUTO: 100.4 FL — HIGH (ref 80–100)
MONOCYTES # BLD AUTO: 0.35 K/UL — SIGNIFICANT CHANGE UP (ref 0–0.9)
MONOCYTES NFR BLD AUTO: 2.6 % — SIGNIFICANT CHANGE UP (ref 2–14)
NEUTROPHILS # BLD AUTO: 10.05 K/UL — HIGH (ref 1.8–7.4)
NEUTROPHILS NFR BLD AUTO: 73.9 % — SIGNIFICANT CHANGE UP (ref 43–77)
PHOSPHATE SERPL-MCNC: 3.9 MG/DL — SIGNIFICANT CHANGE UP (ref 2.5–4.5)
PLATELET # BLD AUTO: 224 K/UL — SIGNIFICANT CHANGE UP (ref 150–400)
POTASSIUM SERPL-MCNC: 3.3 MMOL/L — LOW (ref 3.5–5.3)
POTASSIUM SERPL-SCNC: 3.3 MMOL/L — LOW (ref 3.5–5.3)
PROT SERPL-MCNC: 6.7 G/DL — SIGNIFICANT CHANGE UP (ref 6–8.3)
RBC # BLD: 2.74 M/UL — LOW (ref 4.2–5.8)
RBC # BLD: 2.74 M/UL — LOW (ref 4.2–5.8)
RBC # FLD: 18.9 % — HIGH (ref 10.3–14.5)
RETICS #: 155.1 K/UL — HIGH (ref 25–125)
RETICS/RBC NFR: 5.7 % — HIGH (ref 0.5–2.5)
SODIUM SERPL-SCNC: 134 MMOL/L — LOW (ref 135–145)
SURGICAL PATHOLOGY STUDY: SIGNIFICANT CHANGE UP
WBC # BLD: 13.6 K/UL — HIGH (ref 3.8–10.5)
WBC # FLD AUTO: 13.6 K/UL — HIGH (ref 3.8–10.5)

## 2019-12-17 PROCEDURE — 99232 SBSQ HOSP IP/OBS MODERATE 35: CPT

## 2019-12-17 PROCEDURE — 76776 US EXAM K TRANSPL W/DOPPLER: CPT | Mod: 26,RT

## 2019-12-17 RX ORDER — TRAMADOL HYDROCHLORIDE 50 MG/1
50 TABLET ORAL EVERY 6 HOURS
Refills: 0 | Status: DISCONTINUED | OUTPATIENT
Start: 2019-12-17 | End: 2019-12-20

## 2019-12-17 RX ORDER — TRAMADOL HYDROCHLORIDE 50 MG/1
25 TABLET ORAL EVERY 6 HOURS
Refills: 0 | Status: DISCONTINUED | OUTPATIENT
Start: 2019-12-17 | End: 2019-12-20

## 2019-12-17 RX ORDER — OXYCODONE HYDROCHLORIDE 5 MG/1
5 TABLET ORAL ONCE
Refills: 0 | Status: DISCONTINUED | OUTPATIENT
Start: 2019-12-17 | End: 2019-12-17

## 2019-12-17 RX ORDER — BELATACEPT 250 MG/1
650 INJECTION, POWDER, LYOPHILIZED, FOR SOLUTION INTRAVENOUS ONCE
Refills: 0 | Status: COMPLETED | OUTPATIENT
Start: 2019-12-17 | End: 2019-12-17

## 2019-12-17 RX ORDER — ACETAMINOPHEN 500 MG
650 TABLET ORAL ONCE
Refills: 0 | Status: COMPLETED | OUTPATIENT
Start: 2019-12-17 | End: 2019-12-17

## 2019-12-17 RX ADMIN — TRAMADOL HYDROCHLORIDE 50 MILLIGRAM(S): 50 TABLET ORAL at 14:44

## 2019-12-17 RX ADMIN — LATANOPROST 1 DROP(S): 0.05 SOLUTION/ DROPS OPHTHALMIC; TOPICAL at 22:18

## 2019-12-17 RX ADMIN — Medication 1: at 17:54

## 2019-12-17 RX ADMIN — BRIMONIDINE TARTRATE 1 DROP(S): 2 SOLUTION/ DROPS OPHTHALMIC at 22:18

## 2019-12-17 RX ADMIN — TRAMADOL HYDROCHLORIDE 50 MILLIGRAM(S): 50 TABLET ORAL at 23:15

## 2019-12-17 RX ADMIN — Medication 500000 UNIT(S): at 17:54

## 2019-12-17 RX ADMIN — MYCOPHENOLATE MOFETIL 1 GRAM(S): 250 CAPSULE ORAL at 17:54

## 2019-12-17 RX ADMIN — GABAPENTIN 100 MILLIGRAM(S): 400 CAPSULE ORAL at 17:54

## 2019-12-17 RX ADMIN — Medication 50 MILLIGRAM(S): at 17:54

## 2019-12-17 RX ADMIN — TRAMADOL HYDROCHLORIDE 50 MILLIGRAM(S): 50 TABLET ORAL at 05:51

## 2019-12-17 RX ADMIN — BRIMONIDINE TARTRATE 1 DROP(S): 2 SOLUTION/ DROPS OPHTHALMIC at 05:51

## 2019-12-17 RX ADMIN — Medication 50 MILLIGRAM(S): at 05:50

## 2019-12-17 RX ADMIN — TRAMADOL HYDROCHLORIDE 50 MILLIGRAM(S): 50 TABLET ORAL at 06:53

## 2019-12-17 RX ADMIN — Medication 100 MILLIGRAM(S): at 14:22

## 2019-12-17 RX ADMIN — OXYCODONE HYDROCHLORIDE 5 MILLIGRAM(S): 5 TABLET ORAL at 17:53

## 2019-12-17 RX ADMIN — DORZOLAMIDE HYDROCHLORIDE 1 DROP(S): 20 SOLUTION/ DROPS OPHTHALMIC at 05:51

## 2019-12-17 RX ADMIN — Medication 25 MILLIGRAM(S): at 17:54

## 2019-12-17 RX ADMIN — OXYCODONE HYDROCHLORIDE 5 MILLIGRAM(S): 5 TABLET ORAL at 18:23

## 2019-12-17 RX ADMIN — Medication 60 MILLIGRAM(S): at 05:49

## 2019-12-17 RX ADMIN — DORZOLAMIDE HYDROCHLORIDE 1 DROP(S): 20 SOLUTION/ DROPS OPHTHALMIC at 14:21

## 2019-12-17 RX ADMIN — TRAMADOL HYDROCHLORIDE 50 MILLIGRAM(S): 50 TABLET ORAL at 14:14

## 2019-12-17 RX ADMIN — Medication 650 MILLIGRAM(S): at 18:23

## 2019-12-17 RX ADMIN — SODIUM CHLORIDE 3 MILLILITER(S): 9 INJECTION INTRAMUSCULAR; INTRAVENOUS; SUBCUTANEOUS at 14:21

## 2019-12-17 RX ADMIN — Medication 500000 UNIT(S): at 14:22

## 2019-12-17 RX ADMIN — Medication 500000 UNIT(S): at 05:50

## 2019-12-17 RX ADMIN — Medication 650 MILLIGRAM(S): at 17:53

## 2019-12-17 RX ADMIN — Medication 25 MILLIGRAM(S): at 05:49

## 2019-12-17 RX ADMIN — MYCOPHENOLATE MOFETIL 1 GRAM(S): 250 CAPSULE ORAL at 07:31

## 2019-12-17 RX ADMIN — TRAMADOL HYDROCHLORIDE 50 MILLIGRAM(S): 50 TABLET ORAL at 22:18

## 2019-12-17 RX ADMIN — GABAPENTIN 100 MILLIGRAM(S): 400 CAPSULE ORAL at 05:50

## 2019-12-17 RX ADMIN — BELATACEPT 200 MILLIGRAM(S): 250 INJECTION, POWDER, LYOPHILIZED, FOR SOLUTION INTRAVENOUS at 14:13

## 2019-12-17 RX ADMIN — BRIMONIDINE TARTRATE 1 DROP(S): 2 SOLUTION/ DROPS OPHTHALMIC at 14:21

## 2019-12-17 RX ADMIN — FAMOTIDINE 20 MILLIGRAM(S): 10 INJECTION INTRAVENOUS at 14:21

## 2019-12-17 RX ADMIN — TRAMADOL HYDROCHLORIDE 25 MILLIGRAM(S): 50 TABLET ORAL at 08:49

## 2019-12-17 RX ADMIN — SODIUM CHLORIDE 3 MILLILITER(S): 9 INJECTION INTRAMUSCULAR; INTRAVENOUS; SUBCUTANEOUS at 06:55

## 2019-12-17 RX ADMIN — TRAMADOL HYDROCHLORIDE 25 MILLIGRAM(S): 50 TABLET ORAL at 09:19

## 2019-12-17 RX ADMIN — DORZOLAMIDE HYDROCHLORIDE 1 DROP(S): 20 SOLUTION/ DROPS OPHTHALMIC at 22:18

## 2019-12-17 RX ADMIN — Medication 1 TABLET(S): at 14:22

## 2019-12-17 RX ADMIN — Medication 1000 MILLIGRAM(S): at 00:47

## 2019-12-17 NOTE — PROGRESS NOTE ADULT - PROBLEM SELECTOR PLAN 1
Patient s/p DDRT on 12/8/2019 with DGF in setting ATN (2/2 CNI) & TMA on renal biopsy (12/13) with peripheral smear + schistocytes thrombocytopenia, low haptoglobin/elevated LDH. Started on belatacept 12/13 and plasmapheresis 12/14.  Today LDH/Haptoglobin/Hgb within normal range  Remains oliguric but electrolytes stable.    - Planned for HD today  - plan Belatacept  mg today  - continue cellcept 1 gm BID, and steroid taper.  - c/w prophylaxis with Valcyte, Bactrim, and Nystatin.  - f/u repeat renal transplant ultrasound today  - f/u TMA Complement panel (sent 12/12), Genetic susceptibility panel, DSA (sent 12/12)  - monitor SCr/electrolytes, strict I/O, daily weight

## 2019-12-17 NOTE — PROGRESS NOTE ADULT - SUBJECTIVE AND OBJECTIVE BOX
Transplant Surgery - Multidisciplinary Rounds  --------------------------------------------------------------  DDRT     Date: 12/8/2019      POD 9    Present:   Patient seen with multidisciplinary team including Transplant Surgeon: Dr. Poe, Dr. Cruz, Dr. Espinal. Transplant Nephrologist: Dr. Tamayo.  Pharmacist: Debbi Ortiz. NP: Denis,, MAEVE Montano  and Surgical Resident Mehdi Bess during am rounds and examined with Dr. Poe.   Disciplines not in attendance will be notified of the plan.     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 Young, followed by Dr. Raphael, [facility] Oncologist), RA with hand contractures.     Underwent DDRT on 12/8/19 (ureteral stent sutured to Martinez - removed 12/15). Post op course c/b DGF requiring HD, thrombocytopenia, elevated LDH and Hapto < 20, Schistocytes  on peripheral smear concerning for TMA. Envarsus held. Received Belatacept 12/13. Started on PLEX (12/12, 12/15). Underwent renal bx on 12/13 c/w profound ATN.        Recipient info:   CPRA:    o%  ABO:      A+  CMVAb:  Positive  EBVIgG Status:  Positive  Last HD:  12/7/2019    Donor ID:  [id number]  Match: [id number]  OPO: NYRT  Age:  49  ABO:  A  KDPI 81%  COD:  Anoxia  X Clamp Time:  12/7/2019 1538.  Medical Hx: DM, HTN, HLD, obesity BMI 53, CAD, CABAGX3  Terminal Cr:  1/1.8/1.7  CMV- Neg EBV- Neg    OR:    DDRT to right iliac fossa. Simulect induction. Two arteries anastomosed to a single cuff on the back table. One vein, one ureter. Ureteral anastomosis performed over a double J stent, which was sutured to the martinez.   Cold ischemia time 25.5 hours.    Interval Events:    - Afebrile, VSS. No overnight events.   - Remains with minimal urine output (155cc), BO x 1 with SS drainage  - Scheduled for HD today and Belatacept     Potential Discharge date: Pending clinical improvement     Education: Medications and plan of care    Plan of care: See below    MEDICATIONS  (STANDING):  allopurinol 100 milliGRAM(s) Oral daily  belatacept IVPB 650 milliGRAM(s) IV Intermittent once  brimonidine 0.2% Ophthalmic Solution 1 Drop(s) Left EYE three times a day  chlorhexidine 4% Liquid 1 Application(s) Topical <User Schedule>  dextrose 5%. 1000 milliLiter(s) (50 mL/Hr) IV Continuous <Continuous>  dextrose 50% Injectable 12.5 Gram(s) IV Push once  dextrose 50% Injectable 25 Gram(s) IV Push once  dextrose 50% Injectable 25 Gram(s) IV Push once  dorzolamide 2% Ophthalmic Solution 1 Drop(s) Left EYE three times a day  famotidine    Tablet 20 milliGRAM(s) Oral daily  gabapentin 100 milliGRAM(s) Oral two times a day  hydrALAZINE 50 milliGRAM(s) Oral two times a day  insulin lispro (HumaLOG) corrective regimen sliding scale   SubCutaneous three times a day before meals  latanoprost 0.005% Ophthalmic Solution 1 Drop(s) Left EYE at bedtime  metoprolol tartrate 25 milliGRAM(s) Oral two times a day  mycophenolate mofetil 1 Gram(s) Oral every 12 hours  nystatin    Suspension 595304 Unit(s) Swish and Swallow four times a day  sodium chloride 0.9% lock flush 3 milliLiter(s) IV Push every 8 hours  trimethoprim   80 mG/sulfamethoxazole 400 mG 1 Tablet(s) Oral daily  valGANciclovir 450 milliGRAM(s) Oral <User Schedule>    MEDICATIONS  (PRN):  dextrose 40% Gel 15 Gram(s) Oral once PRN Blood Glucose LESS THAN 70 milliGRAM(s)/deciliter  glucagon  Injectable 1 milliGRAM(s) IntraMuscular once PRN Glucose LESS THAN 70 milligrams/deciliter  lidocaine 2% Jelly 10 milliLiter(s) IntraUrethral three times a day PRN Ureteral irritation  ondansetron Injectable 4 milliGRAM(s) IV Push every 6 hours PRN Nausea and/or Vomiting  traMADol 25 milliGRAM(s) Oral every 6 hours PRN Moderate Pain (4 - 6)  traMADol 50 milliGRAM(s) Oral every 6 hours PRN Severe Pain (7 - 10)      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  Carpal tunnel syndrome  Brain cyst: had MRI recently- now gone  Anemia  Gastric ulcer: Long time ago  Rheumatoid arthritis  Breakdown (mechanical) of penile (implanted) prosthesis, sequela  End-stage renal disease (ESRD): PERMACATH PLACEMENT  Abscess of left buttock: s/p I &amp; D  AV fistula: placement left lower arm  S/P cystoscopy: stent placement &amp; removal for kidney stones, lithiotripsy  s/p Lt Carpal tunnel: 2011      Vital Signs Last 24 Hrs  T(C): 36.6 (17 Dec 2019 09:40), Max: 37 (16 Dec 2019 15:38)  T(F): 97.9 (17 Dec 2019 09:40), Max: 98.6 (16 Dec 2019 15:38)  HR: 74 (17 Dec 2019 09:40) (63 - 88)  BP: 112/62 (17 Dec 2019 09:40) (112/62 - 145/78)  BP(mean): --  RR: 18 (17 Dec 2019 09:40) (18 - 18)  SpO2: 98% (17 Dec 2019 09:40) (97% - 100%)    I&O's Summary    16 Dec 2019 07:01  -  17 Dec 2019 07:00  --------------------------------------------------------  IN: 1060 mL / OUT: 270 mL / NET: 790 mL    17 Dec 2019 07:01  -  17 Dec 2019 11:47  --------------------------------------------------------  IN: 360 mL / OUT: 100 mL / NET: 260 mL                          8.9    13.60 )-----------( 224      ( 17 Dec 2019 06:59 )             27.5     12-17    134<L>  |  87<L>  |  66<H>  ----------------------------<  99  3.3<L>   |  28  |  7.20<H>    Ca    8.4      17 Dec 2019 06:58  Phos  3.9     12-17  Mg     1.9     12-17    TPro  6.7  /  Alb  3.8  /  TBili  0.5  /  DBili  x   /  AST  38  /  ALT  24  /  AlkPhos  84  12-17      REVIEW OF SYSTEMS  --------------------------------------------------------------------------------  Gen: No weight changes, fatigue, fevers/chills, + weakness  Skin: No rashes  Head/Eyes/Ears/Mouth: No headache; Normal hearing; Normal vision w/o blurriness; No sinus pain/discomfort, sore throat  Respiratory: No dyspnea, cough, wheezing, hemoptysis  CV: No chest pain, PND, orthopnea  GI: No abdominal pain, constipation, nausea, vomiting, melena, hematochezia. no diarrhea  :  No increased frequency, dysuria, hematuria, nocturia  MSK: No joint pain/swelling; no back pain; no edema  Neuro: No dizziness/lightheadedness, weakness, seizures, numbness, tingling  Heme: No easy bruising or bleeding  Endo: No heat/cold intolerance  Psych: No significant nervousness, anxiety, stress, depression  All other systems were reviewed and are negative, except as noted.      PHYSICAL EXAM:  Constitutional: Well developed / well nourished  Eyes: Anicteric, PERRLA  ENMT: nc/at  Neck: Supple  Respiratory: CTA B/L  Cardiovascular: RRR  Gastrointestinal: Soft abdomen, ND. mild incisional TTP. Incision c/d/i. JPx1 minimal ss.    Genitourinary:  voiding  Extremities: SCD's in place and working bilaterally  Vascular: Palpable dp pulses bilaterally  Neurological: A&O x3  Skin: no new lesions, ulcerations  Musculoskeletal: Moving all extremities  Psychiatric: Responsive

## 2019-12-17 NOTE — PROGRESS NOTE ADULT - ASSESSMENT
48 yo M with hx of ESRD on HD, s/p DDRT on 12/8, elevated SCr on HD using AVF, s/p PLEX on 12/12 due to concern for TMA (drop in platelet counts 200s-->40s, schistocytes on PB, elevated , hapto <20, Tbili 1.3,) s/p plasmapheresis 12/14  - renal biopsy 12/13 report showed ATN w/ focal TMA, currently monitor for renal recovery

## 2019-12-17 NOTE — PROGRESS NOTE ADULT - SUBJECTIVE AND OBJECTIVE BOX
Seen on HD, w/o complaints    Vital Signs Last 24 Hrs  T(C): 36.2 (12-17-19 @ 12:45), Max: 37 (12-16-19 @ 15:38)  T(F): 97.1 (12-17-19 @ 12:45), Max: 98.6 (12-16-19 @ 15:38)  HR: 70 (12-17-19 @ 12:45) (63 - 88)  BP: 131/75 (12-17-19 @ 12:45) (112/62 - 145/78)  RR: 18 (12-17-19 @ 12:45) (18 - 18)  SpO2: 100% (12-17-19 @ 12:45) (97% - 100%)    Card S1S2  Lungs b/l air entry  Abd soft  Extr no edema, AVF patent                                                8.9    13.60 )-----------( 224      ( 17 Dec 2019 06:59 )             27.5     17 Dec 2019 06:58    134    |  87     |  66     ----------------------------<  99     3.3     |  28     |  7.20     Ca    8.4        17 Dec 2019 06:58  Phos  3.9       17 Dec 2019 06:58  Mg     1.9       17 Dec 2019 06:58    TPro  6.7    /  Alb  3.8    /  TBili  0.5    /  DBili  x      /  AST  38     /  ALT  24     /  AlkPhos  84     17 Dec 2019 06:58    LIVER FUNCTIONS - ( 17 Dec 2019 06:58 )  Alb: 3.8 g/dL / Pro: 6.7 g/dL / ALK PHOS: 84 U/L / ALT: 24 U/L / AST: 38 U/L / GGT: x           allopurinol 100 milliGRAM(s) Oral daily  brimonidine 0.2% Ophthalmic Solution 1 Drop(s) Left EYE three times a day  chlorhexidine 4% Liquid 1 Application(s) Topical <User Schedule>  dextrose 40% Gel 15 Gram(s) Oral once PRN  dextrose 5%. 1000 milliLiter(s) IV Continuous <Continuous>  dextrose 50% Injectable 12.5 Gram(s) IV Push once  dextrose 50% Injectable 25 Gram(s) IV Push once  dextrose 50% Injectable 25 Gram(s) IV Push once  dorzolamide 2% Ophthalmic Solution 1 Drop(s) Left EYE three times a day  famotidine    Tablet 20 milliGRAM(s) Oral daily  gabapentin 100 milliGRAM(s) Oral two times a day  glucagon  Injectable 1 milliGRAM(s) IntraMuscular once PRN  hydrALAZINE 50 milliGRAM(s) Oral two times a day  insulin lispro (HumaLOG) corrective regimen sliding scale   SubCutaneous three times a day before meals  latanoprost 0.005% Ophthalmic Solution 1 Drop(s) Left EYE at bedtime  lidocaine 2% Jelly 10 milliLiter(s) IntraUrethral three times a day PRN  metoprolol tartrate 25 milliGRAM(s) Oral two times a day  mycophenolate mofetil 1 Gram(s) Oral every 12 hours  nystatin    Suspension 978368 Unit(s) Swish and Swallow four times a day  ondansetron Injectable 4 milliGRAM(s) IV Push every 6 hours PRN  sodium chloride 0.9% lock flush 3 milliLiter(s) IV Push every 8 hours  traMADol 25 milliGRAM(s) Oral every 6 hours PRN  traMADol 50 milliGRAM(s) Oral every 6 hours PRN  trimethoprim   80 mG/sulfamethoxazole 400 mG 1 Tablet(s) Oral daily  valGANciclovir 450 milliGRAM(s) Oral <User Schedule>    A/P:    ESRD on HD  S/p DDRT on 12/8/2019  DGF  S/p transplant kidney bx 12/13: ATN, focal TMA  S/p PLEX 12/13 and 12/15  Immunosuppresion/transplant management per transplant team  Heme/Onc following  UO still poor  Plan for HD 3 x week w/close f/u of UO, Cr  Heparin free  Will follow

## 2019-12-17 NOTE — PROGRESS NOTE ADULT - ASSESSMENT
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 Young, followed by Dr. Raphael, [facility] Oncologist), RA with hand contractures  s/p DDRT on 12/8/19 complicated by DGF requiring HD now with elevated LDH, thrombocytopenia and + schistocytes on smear concerning for TMA.     s/p DDRT:  - Delayed graft function requiring HD. Oliguric.   - Elevated LDH and Thrombocytopenia,  + Schistocytes all concerning for TMA.  S/P 2 sessions PLEX.  Platelets improving  ? related to CNI  - ADAMTS 13      43.5  - Appreciate Heme consult. TMA Complement panel sent 12/12 ([facility]) and  aHUS Genetic susceptibility panel today ( [facility])  - FU DSA sent 12/12  - strict I&Os (JPx1)  - On PRN Tylenol and tramadol for pain  - Immunosuppression: OFF CNI.  Continue MMF 1/1, Pred 60mg daily, Belatacept today   - PPX: Nystatin, bactrim, Valcyte renally dosed BIW  - Repeat Renal US today     HTN (Hypertension)  -well controlled  -Continue Metoprolol and hydralazine

## 2019-12-17 NOTE — PROGRESS NOTE ADULT - SUBJECTIVE AND OBJECTIVE BOX
[facility] DIVISION OF KIDNEY DISEASES AND HYPERTENSION -- FOLLOW UP NOTE  --------------------------------------------------------------------------------  Chief Complaint:    24 hour events/subjective:  - no overnight events, vitals wnl, total  cc voided and R BO drain 115 cc  - pt seen and examined in hemodialysis unit w/o complain  - plan for belatacept today      PAST HISTORY  --------------------------------------------------------------------------------  No significant changes to PMH, PSH, FHx, SHx, unless otherwise noted    ALLERGIES & MEDICATIONS  --------------------------------------------------------------------------------  Allergies    coconut (Anaphylaxis)  morphine (Pruritus; Rash)    Intolerances      Standing Inpatient Medications  allopurinol 100 milliGRAM(s) Oral daily  belatacept IVPB 650 milliGRAM(s) IV Intermittent once  brimonidine 0.2% Ophthalmic Solution 1 Drop(s) Left EYE three times a day  chlorhexidine 4% Liquid 1 Application(s) Topical <User Schedule>  dextrose 5%. 1000 milliLiter(s) IV Continuous <Continuous>  dextrose 50% Injectable 12.5 Gram(s) IV Push once  dextrose 50% Injectable 25 Gram(s) IV Push once  dextrose 50% Injectable 25 Gram(s) IV Push once  dorzolamide 2% Ophthalmic Solution 1 Drop(s) Left EYE three times a day  famotidine    Tablet 20 milliGRAM(s) Oral daily  gabapentin 100 milliGRAM(s) Oral two times a day  hydrALAZINE 50 milliGRAM(s) Oral two times a day  insulin lispro (HumaLOG) corrective regimen sliding scale   SubCutaneous three times a day before meals  latanoprost 0.005% Ophthalmic Solution 1 Drop(s) Left EYE at bedtime  metoprolol tartrate 25 milliGRAM(s) Oral two times a day  mycophenolate mofetil 1 Gram(s) Oral every 12 hours  nystatin    Suspension [id number] Unit(s) Swish and Swallow four times a day  sodium chloride 0.9% lock flush 3 milliLiter(s) IV Push every 8 hours  trimethoprim   80 mG/sulfamethoxazole 400 mG 1 Tablet(s) Oral daily  valGANciclovir 450 milliGRAM(s) Oral <User Schedule>    PRN Inpatient Medications  dextrose 40% Gel 15 Gram(s) Oral once PRN  glucagon  Injectable 1 milliGRAM(s) IntraMuscular once PRN  lidocaine 2% Jelly 10 milliLiter(s) IntraUrethral three times a day PRN  ondansetron Injectable 4 milliGRAM(s) IV Push every 6 hours PRN  traMADol 25 milliGRAM(s) Oral every 6 hours PRN  traMADol 50 milliGRAM(s) Oral every 6 hours PRN      REVIEW OF SYSTEMS  Gen: No fatigue, fevers/chills, weakness  Skin: No rashes  Respiratory: No dyspnea, cough  CV: No chest pain, PND, orthopnea  GI: No abdominal pain, diarrhea, constipation, nausea, vomiting  : No increased frequency, dysuria  MSK: No edema  Neuro: No dizziness/lightheadedness, weakness    All other systems were reviewed and are negative, except as noted.    VITALS/PHYSICAL EXAM  --------------------------------------------------------------------------------  T(C): 36.2 (12-17-19 @ 12:45), Max: 37 (12-16-19 @ 15:38)  HR: 70 (12-17-19 @ 12:45) (63 - 88)  BP: 131/75 (12-17-19 @ 12:45) (112/62 - 145/78)  RR: 18 (12-17-19 @ 12:45) (18 - 18)  SpO2: 100% (12-17-19 @ 12:45) (97% - 100%)  Wt(kg): --        12-16-19 @ 07:01  -  12-17-19 @ 07:00  --------------------------------------------------------  IN: 1060 mL / OUT: 270 mL / NET: 790 mL    12-17-19 @ 07:01  -  12-17-19 @ 13:43  --------------------------------------------------------  IN: 360 mL / OUT: 1100 mL / NET: -740 mL      Physical Exam:  	Gen: NAD, well-appearing on room air  	HEENT: clear oropharynx  	Pulm: CTA B/L  	CV: RRR, S1S2, no murmur  	Abd: +BS, soft, nondistended, incision c/d/i  	LE: Warm, mild R ankle edema (Left no edema), RUE AVF +Thrills  	Psych: Normal affect and mood  	Skin: Warm, without rashes  	Vascular access:    LABS/STUDIES  --------------------------------------------------------------------------------              8.9    13.60 >-----------<  224      [12-17-19 @ 06:59]              27.5     134  |  87  |  66  ----------------------------<  99      [12-17-19 @ 06:58]  3.3   |  28  |  7.20        Ca     8.4     [12-17-19 @ 06:58]      Mg     1.9     [12-17-19 @ 06:58]      Phos  3.9     [12-17-19 @ 06:58]    TPro  6.7  /  Alb  3.8  /  TBili  0.5  /  DBili  x   /  AST  38  /  ALT  24  /  AlkPhos  84  [12-17-19 @ 06:58]              [12-17-19 @ 06:58]    Creatinine Trend:  SCr 7.20 [12-17 @ 06:58]  SCr 5.79 [12-16 @ 06:01]  SCr 4.45 [12-15 @ 06:21]  SCr 5.70 [12-14 @ 06:55]  SCr 4.35 [12-13 @ 04:53]        HbA1c 5.0      [12-11-19 @ 08:21]    HBsAg Nonreact      [12-12-19 @ 22:37]  HCV 0.15, Nonreact      [12-12-19 @ 22:37]

## 2019-12-18 DIAGNOSIS — M79.89 OTHER SPECIFIED SOFT TISSUE DISORDERS: ICD-10-CM

## 2019-12-18 LAB
ALBUMIN SERPL ELPH-MCNC: 3.5 G/DL — SIGNIFICANT CHANGE UP (ref 3.3–5)
ALP SERPL-CCNC: 82 U/L — SIGNIFICANT CHANGE UP (ref 40–120)
ALT FLD-CCNC: 24 U/L — SIGNIFICANT CHANGE UP (ref 10–45)
ANION GAP SERPL CALC-SCNC: 18 MMOL/L — HIGH (ref 5–17)
AST SERPL-CCNC: 33 U/L — SIGNIFICANT CHANGE UP (ref 10–40)
BASOPHILS # BLD AUTO: 0 K/UL — SIGNIFICANT CHANGE UP (ref 0–0.2)
BASOPHILS NFR BLD AUTO: 0 % — SIGNIFICANT CHANGE UP (ref 0–2)
BILIRUB SERPL-MCNC: 0.5 MG/DL — SIGNIFICANT CHANGE UP (ref 0.2–1.2)
BUN SERPL-MCNC: 43 MG/DL — HIGH (ref 7–23)
CALCIUM SERPL-MCNC: 8.7 MG/DL — SIGNIFICANT CHANGE UP (ref 8.4–10.5)
CHLORIDE SERPL-SCNC: 91 MMOL/L — LOW (ref 96–108)
CO2 SERPL-SCNC: 25 MMOL/L — SIGNIFICANT CHANGE UP (ref 22–31)
CREAT FLD-MCNC: 5.39 MG/DL — SIGNIFICANT CHANGE UP
CREAT SERPL-MCNC: 5.2 MG/DL — HIGH (ref 0.5–1.3)
EOSINOPHIL # BLD AUTO: 0 K/UL — SIGNIFICANT CHANGE UP (ref 0–0.5)
EOSINOPHIL NFR BLD AUTO: 0 % — SIGNIFICANT CHANGE UP (ref 0–6)
GLUCOSE BLDC GLUCOMTR-MCNC: 137 MG/DL — HIGH (ref 70–99)
GLUCOSE BLDC GLUCOMTR-MCNC: 183 MG/DL — HIGH (ref 70–99)
GLUCOSE BLDC GLUCOMTR-MCNC: 262 MG/DL — HIGH (ref 70–99)
GLUCOSE SERPL-MCNC: 109 MG/DL — HIGH (ref 70–99)
HAPTOGLOB SERPL-MCNC: 73 MG/DL — SIGNIFICANT CHANGE UP (ref 34–200)
HCT VFR BLD CALC: 25.3 % — LOW (ref 39–50)
HGB BLD-MCNC: 8.1 G/DL — LOW (ref 13–17)
LDH SERPL L TO P-CCNC: 268 U/L — HIGH (ref 50–242)
LYMPHOCYTES # BLD AUTO: 1.6 K/UL — SIGNIFICANT CHANGE UP (ref 1–3.3)
LYMPHOCYTES # BLD AUTO: 11.3 % — LOW (ref 13–44)
MAGNESIUM SERPL-MCNC: 1.9 MG/DL — SIGNIFICANT CHANGE UP (ref 1.6–2.6)
MCHC RBC-ENTMCNC: 32 GM/DL — SIGNIFICANT CHANGE UP (ref 32–36)
MCHC RBC-ENTMCNC: 32.7 PG — SIGNIFICANT CHANGE UP (ref 27–34)
MCV RBC AUTO: 102 FL — HIGH (ref 80–100)
MONOCYTES # BLD AUTO: 0.61 K/UL — SIGNIFICANT CHANGE UP (ref 0–0.9)
MONOCYTES NFR BLD AUTO: 4.3 % — SIGNIFICANT CHANGE UP (ref 2–14)
NEUTROPHILS # BLD AUTO: 11.82 K/UL — HIGH (ref 1.8–7.4)
NEUTROPHILS NFR BLD AUTO: 83.5 % — HIGH (ref 43–77)
PHOSPHATE SERPL-MCNC: 3.5 MG/DL — SIGNIFICANT CHANGE UP (ref 2.5–4.5)
PLATELET # BLD AUTO: 215 K/UL — SIGNIFICANT CHANGE UP (ref 150–400)
POTASSIUM SERPL-MCNC: 3.6 MMOL/L — SIGNIFICANT CHANGE UP (ref 3.5–5.3)
POTASSIUM SERPL-SCNC: 3.6 MMOL/L — SIGNIFICANT CHANGE UP (ref 3.5–5.3)
PROT SERPL-MCNC: 6.6 G/DL — SIGNIFICANT CHANGE UP (ref 6–8.3)
RBC # BLD: 2.48 M/UL — LOW (ref 4.2–5.8)
RBC # BLD: 2.48 M/UL — LOW (ref 4.2–5.8)
RBC # FLD: 18.7 % — HIGH (ref 10.3–14.5)
RETICS #: 114.3 K/UL — SIGNIFICANT CHANGE UP (ref 25–125)
RETICS/RBC NFR: 4.6 % — HIGH (ref 0.5–2.5)
SODIUM SERPL-SCNC: 134 MMOL/L — LOW (ref 135–145)
WBC # BLD: 14.15 K/UL — HIGH (ref 3.8–10.5)
WBC # FLD AUTO: 14.15 K/UL — HIGH (ref 3.8–10.5)

## 2019-12-18 PROCEDURE — 93970 EXTREMITY STUDY: CPT | Mod: 26

## 2019-12-18 PROCEDURE — 99233 SBSQ HOSP IP/OBS HIGH 50: CPT

## 2019-12-18 PROCEDURE — 76776 US EXAM K TRANSPL W/DOPPLER: CPT | Mod: 26,RT

## 2019-12-18 RX ORDER — BELATACEPT 250 MG/1
250 INJECTION, POWDER, LYOPHILIZED, FOR SOLUTION INTRAVENOUS
Qty: 1 | Refills: 11 | Status: DISCONTINUED | OUTPATIENT
Start: 2019-12-18 | End: 2019-12-18

## 2019-12-18 RX ORDER — BELATACEPT 250 MG/1
250 INJECTION, POWDER, LYOPHILIZED, FOR SOLUTION INTRAVENOUS ONCE
Qty: 1 | Refills: 3 | Status: DISCONTINUED | OUTPATIENT
Start: 2019-12-18 | End: 2019-12-18

## 2019-12-18 RX ORDER — TACROLIMUS 4 MG/1
4 TABLET, EXTENDED RELEASE ORAL
Qty: 90 | Refills: 11 | Status: DISCONTINUED | COMMUNITY
Start: 2019-12-09 | End: 2019-12-18

## 2019-12-18 RX ORDER — ACETAMINOPHEN 500 MG
650 TABLET ORAL ONCE
Refills: 0 | Status: COMPLETED | OUTPATIENT
Start: 2019-12-18 | End: 2019-12-18

## 2019-12-18 RX ORDER — PREDNISONE 5 MG/1
5 TABLET ORAL
Qty: 30 | Refills: 11 | Status: DISCONTINUED | COMMUNITY
Start: 2019-12-09 | End: 2019-12-18

## 2019-12-18 RX ORDER — PREDNISONE 5 MG/1
5 TABLET ORAL
Qty: 15 | Refills: 0 | Status: DISCONTINUED | COMMUNITY
Start: 2019-12-09 | End: 2019-12-18

## 2019-12-18 RX ORDER — TACROLIMUS 1 MG/1
1 TABLET, EXTENDED RELEASE ORAL
Qty: 90 | Refills: 11 | Status: DISCONTINUED | COMMUNITY
Start: 2019-12-09 | End: 2019-12-18

## 2019-12-18 RX ADMIN — Medication 500000 UNIT(S): at 12:01

## 2019-12-18 RX ADMIN — TRAMADOL HYDROCHLORIDE 50 MILLIGRAM(S): 50 TABLET ORAL at 21:12

## 2019-12-18 RX ADMIN — Medication 500000 UNIT(S): at 05:42

## 2019-12-18 RX ADMIN — SODIUM CHLORIDE 3 MILLILITER(S): 9 INJECTION INTRAMUSCULAR; INTRAVENOUS; SUBCUTANEOUS at 05:44

## 2019-12-18 RX ADMIN — Medication 25 MILLIGRAM(S): at 17:28

## 2019-12-18 RX ADMIN — SODIUM CHLORIDE 3 MILLILITER(S): 9 INJECTION INTRAMUSCULAR; INTRAVENOUS; SUBCUTANEOUS at 12:50

## 2019-12-18 RX ADMIN — Medication 50 MILLIGRAM(S): at 05:42

## 2019-12-18 RX ADMIN — SODIUM CHLORIDE 3 MILLILITER(S): 9 INJECTION INTRAMUSCULAR; INTRAVENOUS; SUBCUTANEOUS at 21:14

## 2019-12-18 RX ADMIN — Medication 500000 UNIT(S): at 23:45

## 2019-12-18 RX ADMIN — Medication 500000 UNIT(S): at 17:27

## 2019-12-18 RX ADMIN — Medication 500000 UNIT(S): at 01:48

## 2019-12-18 RX ADMIN — MYCOPHENOLATE MOFETIL 1 GRAM(S): 250 CAPSULE ORAL at 07:37

## 2019-12-18 RX ADMIN — GABAPENTIN 100 MILLIGRAM(S): 400 CAPSULE ORAL at 17:28

## 2019-12-18 RX ADMIN — GABAPENTIN 100 MILLIGRAM(S): 400 CAPSULE ORAL at 05:42

## 2019-12-18 RX ADMIN — MYCOPHENOLATE MOFETIL 1 GRAM(S): 250 CAPSULE ORAL at 17:27

## 2019-12-18 RX ADMIN — DORZOLAMIDE HYDROCHLORIDE 1 DROP(S): 20 SOLUTION/ DROPS OPHTHALMIC at 05:42

## 2019-12-18 RX ADMIN — DORZOLAMIDE HYDROCHLORIDE 1 DROP(S): 20 SOLUTION/ DROPS OPHTHALMIC at 21:12

## 2019-12-18 RX ADMIN — Medication 40 MILLIGRAM(S): at 05:44

## 2019-12-18 RX ADMIN — TRAMADOL HYDROCHLORIDE 50 MILLIGRAM(S): 50 TABLET ORAL at 07:30

## 2019-12-18 RX ADMIN — TRAMADOL HYDROCHLORIDE 50 MILLIGRAM(S): 50 TABLET ORAL at 13:04

## 2019-12-18 RX ADMIN — Medication 650 MILLIGRAM(S): at 13:03

## 2019-12-18 RX ADMIN — BRIMONIDINE TARTRATE 1 DROP(S): 2 SOLUTION/ DROPS OPHTHALMIC at 17:27

## 2019-12-18 RX ADMIN — TRAMADOL HYDROCHLORIDE 50 MILLIGRAM(S): 50 TABLET ORAL at 06:42

## 2019-12-18 RX ADMIN — TRAMADOL HYDROCHLORIDE 50 MILLIGRAM(S): 50 TABLET ORAL at 22:12

## 2019-12-18 RX ADMIN — LATANOPROST 1 DROP(S): 0.05 SOLUTION/ DROPS OPHTHALMIC; TOPICAL at 21:12

## 2019-12-18 RX ADMIN — FAMOTIDINE 20 MILLIGRAM(S): 10 INJECTION INTRAVENOUS at 12:01

## 2019-12-18 RX ADMIN — BRIMONIDINE TARTRATE 1 DROP(S): 2 SOLUTION/ DROPS OPHTHALMIC at 21:11

## 2019-12-18 RX ADMIN — SODIUM CHLORIDE 3 MILLILITER(S): 9 INJECTION INTRAMUSCULAR; INTRAVENOUS; SUBCUTANEOUS at 01:15

## 2019-12-18 RX ADMIN — BRIMONIDINE TARTRATE 1 DROP(S): 2 SOLUTION/ DROPS OPHTHALMIC at 05:41

## 2019-12-18 RX ADMIN — Medication 100 MILLIGRAM(S): at 12:01

## 2019-12-18 RX ADMIN — DORZOLAMIDE HYDROCHLORIDE 1 DROP(S): 20 SOLUTION/ DROPS OPHTHALMIC at 17:27

## 2019-12-18 RX ADMIN — Medication 1 TABLET(S): at 12:01

## 2019-12-18 NOTE — PROGRESS NOTE ADULT - SUBJECTIVE AND OBJECTIVE BOX
Transplant Surgery - Multidisciplinary Rounds  --------------------------------------------------------------  DDRT     Date: 12/8/2019      POD 10    Present:   Patient seen with multidisciplinary team including Transplant Surgeon: Dr. Poe,. Transplant Nephrologist: Dr. Tamayo.  Pharmacist: Job Diaz NP: Sarah Barrios, and Louis Aviles,   and Surgical Resident Mehdi Bess during am rounds and examined with Dr. Poe.   Disciplines not in attendance will be notified of the plan.     HPI: 49M with significant PMHx of ESRD on HD  (no bx, since 2013 with  TTS via LUE AVF, anuric), HTN, Gout, Glaucoma, NET of pancreas (s/p robotic distal panc/splenectomy at [place] 2015 by Dr. Young, followed by Dr. Raphael, [facility] Oncologist), RA with hand contractures.     Underwent DDRT on 12/8/19 (ureteral stent sutured to Martinez - removed 12/15). Post op course c/b DGF requiring HD, thrombocytopenia, elevated LDH and Hapto < 20, Schistocytes  on peripheral smear concerning for TMA. Envarsus held. Received Belatacept 12/13. Started on PLEX (12/12, 12/15). Underwent renal bx on 12/13 c/w profound ATN.        Recipient info:   CPRA:    o%  ABO:      A+  CMVAb:  Positive  EBVIgG Status:  Positive  Last HD:  12/7/2019    Donor ID:  [id number]  Match: [id number]  OPO: NYRT  Age:  49  ABO:  A  KDPI 81%  COD:  Anoxia  X Clamp Time:  12/7/2019 1538.  Medical Hx: DM, HTN, HLD, obesity BMI 53, CAD, CABAGX3  Terminal Cr:  1/1.8/1.7  CMV- Neg EBV- Neg    OR:    DDRT to right iliac fossa. Simulect induction. Two arteries anastomosed to a single cuff on the back table. One vein, one ureter. Ureteral anastomosis performed over a double J stent, which was sutured to the martinez.   Cold ischemia time 25.5 hours.    Interval Events:    - Afebrile, VSS. No overnight events.   - Remains with minimal urine output (155cc), BO x 1 with SS drainage  - Yesterday received  HD 1L removed  and Belatacept     Potential Discharge date: Pending clinical improvement     Education: Medications and plan of care    Plan of care: See below      MEDICATIONS  (STANDING):  allopurinol 100 milliGRAM(s) Oral daily  brimonidine 0.2% Ophthalmic Solution 1 Drop(s) Left EYE three times a day  chlorhexidine 4% Liquid 1 Application(s) Topical <User Schedule>  dextrose 5%. 1000 milliLiter(s) (50 mL/Hr) IV Continuous <Continuous>  dextrose 50% Injectable 12.5 Gram(s) IV Push once  dextrose 50% Injectable 25 Gram(s) IV Push once  dextrose 50% Injectable 25 Gram(s) IV Push once  dorzolamide 2% Ophthalmic Solution 1 Drop(s) Left EYE three times a day  famotidine    Tablet 20 milliGRAM(s) Oral daily  gabapentin 100 milliGRAM(s) Oral two times a day  hydrALAZINE 50 milliGRAM(s) Oral two times a day  insulin lispro (HumaLOG) corrective regimen sliding scale   SubCutaneous three times a day before meals  latanoprost 0.005% Ophthalmic Solution 1 Drop(s) Left EYE at bedtime  metoprolol tartrate 25 milliGRAM(s) Oral two times a day  mycophenolate mofetil 1 Gram(s) Oral every 12 hours  nystatin    Suspension [id number] Unit(s) Swish and Swallow four times a day  predniSONE   Tablet 20 milliGRAM(s) Oral once  sodium chloride 0.9% lock flush 3 milliLiter(s) IV Push every 8 hours  trimethoprim   80 mG/sulfamethoxazole 400 mG 1 Tablet(s) Oral daily  valGANciclovir 450 milliGRAM(s) Oral <User Schedule>    MEDICATIONS  (PRN):  dextrose 40% Gel 15 Gram(s) Oral once PRN Blood Glucose LESS THAN 70 milliGRAM(s)/deciliter  glucagon  Injectable 1 milliGRAM(s) IntraMuscular once PRN Glucose LESS THAN 70 milligrams/deciliter  lidocaine 2% Jelly 10 milliLiter(s) IntraUrethral three times a day PRN Ureteral irritation  ondansetron Injectable 4 milliGRAM(s) IV Push every 6 hours PRN Nausea and/or Vomiting  traMADol 25 milliGRAM(s) Oral every 6 hours PRN Moderate Pain (4 - 6)  traMADol 50 milliGRAM(s) Oral every 6 hours PRN Severe Pain (7 - 10)      PAST MEDICAL & SURGICAL HISTORY:  Erectile dysfunction  Glaucoma  Gout  HTN (hypertension)  ESRD (end stage renal disease) on dialysis: since 7/2013 [facility] sat  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  Carpal tunnel syndrome  Brain cyst: had MRI recently- now gone  Anemia  Gastric ulcer: Long time ago  Rheumatoid arthritis  Breakdown (mechanical) of penile (implanted) prosthesis, sequela  End-stage renal disease (ESRD): PERMACATH PLACEMENT  Abscess of left buttock: s/p I &amp; D  AV fistula: placement left lower arm  S/P cystoscopy: stent placement &amp; removal for kidney stones, lithiotripsy  s/p Lt Carpal tunnel: 2011      Vital Signs Last 24 Hrs  T(C): 37.1 (18 Dec 2019 09:00), Max: 37.1 (18 Dec 2019 09:00)  T(F): 98.7 (18 Dec 2019 09:00), Max: 98.7 (18 Dec 2019 09:00)  HR: 69 (18 Dec 2019 09:00) (65 - 75)  BP: 114/57 (18 Dec 2019 09:00) (114/57 - 151/78)  BP(mean): --  RR: 18 (18 Dec 2019 09:00) (18 - 18)  SpO2: 98% (18 Dec 2019 09:00) (98% - 100%)    I&O's Summary    17 Dec 2019 07:01  -  18 Dec 2019 07:00  --------------------------------------------------------  IN: 1620 mL / OUT: 1320 mL / NET: 300 mL    18 Dec 2019 07:01  -  18 Dec 2019 12:35  --------------------------------------------------------  IN: 250 mL / OUT: 20 mL / NET: 230 mL          LABS:                        8.1    14.15 )-----------( 215      ( 18 Dec 2019 06:10 )             25.3     12-18    134<L>  |  91<L>  |  43<H>  ----------------------------<  109<H>  3.6   |  25  |  5.20<H>    Ca    8.7      18 Dec 2019 06:10  Phos  3.5     12-18  Mg     1.9     12-18    TPro  6.6  /  Alb  3.5  /  TBili  0.5  /  DBili  x   /  AST  33  /  ALT  24  /  AlkPhos  82  12-18        CAPILLARY BLOOD GLUCOSE      POCT Blood Glucose.: 262 mg/dL (18 Dec 2019 09:36)  POCT Blood Glucose.: 158 mg/dL (17 Dec 2019 21:29)  POCT Blood Glucose.: 163 mg/dL (17 Dec 2019 17:44)  POCT Blood Glucose.: 149 mg/dL (17 Dec 2019 13:14)    LIVER FUNCTIONS - ( 18 Dec 2019 06:10 )  Alb: 3.5 g/dL / Pro: 6.6 g/dL / ALK PHOS: 82 U/L / ALT: 24 U/L / AST: 33 U/L / GGT: x             Culture - Stool (collected 12-14-19 @ 15:33)  Source: .Stool Feces  Final Report (12-16-19 @ 18:41):    No enteric pathogens isolated.    (Stool culture examined for Salmonella,    Shigella, Campylobacter, Aeromonas, Plesiomonas,    Vibrio, E.coli O157 and Yersinia)    GI PCR Panel, Stool (collected 12-14-19 @ 15:05)  Source: .Stool moriah johnie  Final Report (12-14-19 @ 18:05):    GI PCR Results: NOT detected    *******Please Note:*******    GI panel PCR evaluates for:    Campylobacter, Plesiomonas shigelloides, Salmonella,    Vibrio, Yersinia enterocolitica, Enteroaggregative    Escherichia coli (EAEC), Enteropathogenic E.coli (EPEC),    Enterotoxigenic E. coli (ETEC) lt/st, Shiga-like    toxin-producing E. coli (STEC) stx1/stx2,    Shigella/ Enteroinvasive E. coli (EIEC), Cryptosporidium,    Cyclospora cayetanensis, Entamoeba histolytica,    Giardia lamblia, Adenovirus F 40/41, Astrovirus,    Norovirus GI/GII, Rotavirus A, Sapovirus          Cultures:    Culture - Stool (collected 12-14-19 @ 15:33)  Source: .Stool Feces  Final Report (12-16-19 @ 18:41):    No enteric pathogens isolated.    (Stool culture examined for Salmonella,    Shigella, Campylobacter, Aeromonas, Plesiomonas,    Vibrio, E.coli O157 and Yersinia)    GI PCR Panel, Stool (collected 12-14-19 @ 15:05)  Source: .Stool moriah johnie  Final Report (12-14-19 @ 18:05):    GI PCR Results: NOT detected    *******Please Note:*******    GI panel PCR evaluates for:    Campylobacter, Plesiomonas shigelloides, Salmonella,    Vibrio, Yersinia enterocolitica, Enteroaggregative    Escherichia coli (EAEC), Enteropathogenic E.coli (EPEC),    Enterotoxigenic E. coli (ETEC) lt/st, Shiga-like    toxin-producing E. coli (STEC) stx1/stx2,    Shigella/ Enteroinvasive E. coli (EIEC), Cryptosporidium,    Cyclospora cayetanensis, Entamoeba histolytica,    Giardia lamblia, Adenovirus F 40/41, Astrovirus,    Norovirus GI/GII, Rotavirus A, Sapovirus        REVIEW OF SYSTEMS  --------------------------------------------------------------------------------  Gen: No weight changes, fatigue, fevers/chills, + weakness  Skin: No rashes  Head/Eyes/Ears/Mouth: No headache; Normal hearing; Normal vision w/o blurriness; No sinus pain/discomfort, sore throat  Respiratory: No dyspnea, cough, wheezing, hemoptysis  CV: No chest pain, PND, orthopnea  GI: No abdominal pain, constipation, nausea, vomiting, melena, hematochezia. no diarrhea  :  No increased frequency, dysuria, hematuria, nocturia  MSK: No joint pain/swelling; no back pain; no edema  Neuro: No dizziness/lightheadedness, weakness, seizures, numbness, tingling  Heme: No easy bruising or bleeding  Endo: No heat/cold intolerance  Psych: No significant nervousness, anxiety, stress, depression  Ext:  c/o RLE edema  All other systems were reviewed and are negative, except as noted.      PHYSICAL EXAM:  Constitutional: Well developed / well nourished  Eyes: Anicteric, PERRLA  ENMT: nc/at  Neck: Supple  Respiratory: CTA B/L  Cardiovascular: RRR  Gastrointestinal: Soft abdomen, ND. mild incisional TTP. Incision c/d/i. JPx1 minimal ss.    Genitourinary:  voiding  Extremities: RLE +2 edema foot to knee, SCD's in place and working bilaterally  Vascular: Palpable dp pulses bilaterally  Neurological: A&O x3  Skin: no new lesions, ulcerations  Musculoskeletal: Moving all extremities  Psychiatric: Responsive

## 2019-12-18 NOTE — PROVIDER CONTACT NOTE (OTHER) - BACKGROUND
Pt adx to [facility] for DDRT POD #10. PMH: ESRD on HD. Pt on FS check d/t high dose steroids not for DM.

## 2019-12-18 NOTE — PROVIDER CONTACT NOTE (OTHER) - ACTION/TREATMENT ORDERED:
As per NP, hold breakfast dose and will make sure to check for lunch time. Will continue to monitor.

## 2019-12-18 NOTE — PROVIDER CONTACT NOTE (OTHER) - ASSESSMENT
Pt A&Ox4. VS as charted. Pt ate breakfast w/o checking BG, BG checked after breakfast: 262. Give or hold insulin sliding scale?

## 2019-12-18 NOTE — PROGRESS NOTE ADULT - SUBJECTIVE AND OBJECTIVE BOX
[facility] DIVISION OF KIDNEY DISEASES AND HYPERTENSION -- FOLLOW UP NOTE  --------------------------------------------------------------------------------  Chief Complaint:    24 hour events/subjective:  - yesterday had HD session (removed 1L) and received belatacept.  Renal transplant U/S reported increase velocity mid RA, prox normal  - overnight no events, vitals wnl, total UOP voided 165 cc  - pt seen and examined this morning who report increase RLE swelling  - vitals/lab reviewed      PAST HISTORY  --------------------------------------------------------------------------------  No significant changes to PMH, PSH, FHx, SHx, unless otherwise noted    ALLERGIES & MEDICATIONS  --------------------------------------------------------------------------------  Allergies    coconut (Anaphylaxis)  morphine (Pruritus; Rash)    Intolerances      Standing Inpatient Medications  allopurinol 100 milliGRAM(s) Oral daily  brimonidine 0.2% Ophthalmic Solution 1 Drop(s) Left EYE three times a day  chlorhexidine 4% Liquid 1 Application(s) Topical <User Schedule>  dextrose 5%. 1000 milliLiter(s) IV Continuous <Continuous>  dextrose 50% Injectable 12.5 Gram(s) IV Push once  dextrose 50% Injectable 25 Gram(s) IV Push once  dextrose 50% Injectable 25 Gram(s) IV Push once  dorzolamide 2% Ophthalmic Solution 1 Drop(s) Left EYE three times a day  famotidine    Tablet 20 milliGRAM(s) Oral daily  gabapentin 100 milliGRAM(s) Oral two times a day  hydrALAZINE 50 milliGRAM(s) Oral two times a day  insulin lispro (HumaLOG) corrective regimen sliding scale   SubCutaneous three times a day before meals  latanoprost 0.005% Ophthalmic Solution 1 Drop(s) Left EYE at bedtime  metoprolol tartrate 25 milliGRAM(s) Oral two times a day  mycophenolate mofetil 1 Gram(s) Oral every 12 hours  nystatin    Suspension [id number] Unit(s) Swish and Swallow four times a day  predniSONE   Tablet 20 milliGRAM(s) Oral once  sodium chloride 0.9% lock flush 3 milliLiter(s) IV Push every 8 hours  trimethoprim   80 mG/sulfamethoxazole 400 mG 1 Tablet(s) Oral daily  valGANciclovir 450 milliGRAM(s) Oral <User Schedule>    PRN Inpatient Medications  dextrose 40% Gel 15 Gram(s) Oral once PRN  glucagon  Injectable 1 milliGRAM(s) IntraMuscular once PRN  lidocaine 2% Jelly 10 milliLiter(s) IntraUrethral three times a day PRN  ondansetron Injectable 4 milliGRAM(s) IV Push every 6 hours PRN  traMADol 25 milliGRAM(s) Oral every 6 hours PRN  traMADol 50 milliGRAM(s) Oral every 6 hours PRN      REVIEW OF SYSTEMS  --------------------------------------------------------------------------------  Gen: No fatigue, fevers/chills, weakness  Skin: No rashes  Respiratory: No dyspnea, cough  CV: No chest pain, PND, orthopnea  GI: No abdominal pain, diarrhea, constipation, nausea, vomiting  : No increased frequency, dysuria  MSK: + RLE edema  Neuro: No dizziness/lightheadedness, weakness    All other systems were reviewed and are negative, except as noted.    VITALS/PHYSICAL EXAM  --------------------------------------------------------------------------------  T(C): 37.1 (12-18-19 @ 09:00), Max: 37.1 (12-18-19 @ 09:00)  HR: 69 (12-18-19 @ 09:00) (65 - 75)  BP: 114/57 (12-18-19 @ 09:00) (114/57 - 151/78)  RR: 18 (12-18-19 @ 09:00) (18 - 18)  SpO2: 98% (12-18-19 @ 09:00) (98% - 100%)  Wt(kg): --        12-17-19 @ 07:01  -  12-18-19 @ 07:00  --------------------------------------------------------  IN: 1620 mL / OUT: 1320 mL / NET: 300 mL    12-18-19 @ 07:01  -  12-18-19 @ 11:46  --------------------------------------------------------  IN: 250 mL / OUT: 20 mL / NET: 230 mL      Physical Exam:  	Gen: NAD, well-appearing on room air  	HEENT: clear oropharynx, mmm, supple neck  	Pulm: CTA B/L, no crackles  	CV: RRR, S1S2, no murmur  	Abd: +BS, soft, nondistended, incision c/d/i, R PJ drain  	LE: Warm, R ankle edema +2, RUE AVF +Thrills  	Psych: Normal affect and mood  	Skin: Warm, without rashes  	Vascular access:    LABS/STUDIES  --------------------------------------------------------------------------------              8.1    14.15 >-----------<  215      [12-18-19 @ 06:10]              25.3     134  |  91  |  43  ----------------------------<  109      [12-18-19 @ 06:10]  3.6   |  25  |  5.20        Ca     8.7     [12-18-19 @ 06:10]      Mg     1.9     [12-18-19 @ 06:10]      Phos  3.5     [12-18-19 @ 06:10]    TPro  6.6  /  Alb  3.5  /  TBili  0.5  /  DBili  x   /  AST  33  /  ALT  24  /  AlkPhos  82  [12-18-19 @ 06:10]              [12-18-19 @ 06:10]    Creatinine Trend:  SCr 5.20 [12-18 @ 06:10]  SCr 7.20 [12-17 @ 06:58]  SCr 5.79 [12-16 @ 06:01]  SCr 4.45 [12-15 @ 06:21]  SCr 5.70 [12-14 @ 06:55]        HbA1c 5.0      [12-11-19 @ 08:21]    HBsAg Nonreact      [12-12-19 @ 22:37]  HCV 0.15, Nonreact      [12-12-19 @ 22:37]

## 2019-12-18 NOTE — PROGRESS NOTE ADULT - ASSESSMENT
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 Young, followed by Dr. Raphael, [facility] Oncologist), RA with hand contractures  s/p DDRT on 12/8/19 complicated by DGF requiring HD now with elevated LDH, thrombocytopenia and + schistocytes on smear concerning for TMA.     s/p DDRT:  - Delayed graft function requiring HD. Oliguric.   - Elevated LDH and Thrombocytopenia,  + Schistocytes all concerning for TMA.  S/P 2 sessions PLEX.  Platelets improving  ? related to CNI  - ADAMTS 13      43.5  - Appreciate Heme consult. TMA Complement panel sent 12/12 ([facility]) and  aHUS Genetic susceptibility panel today ( [facility])  - FU DSA sent 12/12  - strict I&Os (JPx1)  - On PRN Tylenol and tramadol for pain    - Immunosuppression: OFF CNI.  Continue MMF 1/1, Pred 60mg daily, Belatacept x 2 (12/13, 12/17)  - PPX: Nystatin, bactrim, Valcyte renally dosed BIW  - Repeat Renal US today   -RLE doppler 2/2 new edema     HTN (Hypertension)  -well controlled  -Continue Metoprolol and hydralazine

## 2019-12-18 NOTE — PROGRESS NOTE ADULT - PROBLEM SELECTOR PLAN 1
Patient s/p DDRT on 12/8/2019 with DGF in setting ATN (2/2 CNI) & TMA on renal biopsy (12/13) with peripheral smear + schistocytes thrombocytopenia, low haptoglobin/elevated LDH. Started on belatacept 12/13 (& 12/17) and plasmapheresis 12/14.   Remains oliguric but electrolytes stable.    - no HD today  - continue cellcept 1 gm BID, and steroid taper.  Betalacept next dose day #14  - c/w prophylaxis with Valcyte, Bactrim, and Nystatin.  - f/u TMA Complement panel (sent 12/12), Genetic susceptibility panel, DSA (sent 12/12)  - monitor SCr/electrolytes, strict I/O, daily weight

## 2019-12-19 LAB
ALBUMIN SERPL ELPH-MCNC: 3.8 G/DL — SIGNIFICANT CHANGE UP (ref 3.3–5)
ALP SERPL-CCNC: 94 U/L — SIGNIFICANT CHANGE UP (ref 40–120)
ALT FLD-CCNC: 26 U/L — SIGNIFICANT CHANGE UP (ref 10–45)
ANION GAP SERPL CALC-SCNC: 22 MMOL/L — HIGH (ref 5–17)
AST SERPL-CCNC: 45 U/L — HIGH (ref 10–40)
BASOPHILS # BLD AUTO: 0 K/UL — SIGNIFICANT CHANGE UP (ref 0–0.2)
BASOPHILS NFR BLD AUTO: 0 % — SIGNIFICANT CHANGE UP (ref 0–2)
BILIRUB SERPL-MCNC: 0.6 MG/DL — SIGNIFICANT CHANGE UP (ref 0.2–1.2)
BUN SERPL-MCNC: 63 MG/DL — HIGH (ref 7–23)
CALCIUM SERPL-MCNC: 9 MG/DL — SIGNIFICANT CHANGE UP (ref 8.4–10.5)
CHLORIDE SERPL-SCNC: 90 MMOL/L — LOW (ref 96–108)
CO2 SERPL-SCNC: 23 MMOL/L — SIGNIFICANT CHANGE UP (ref 22–31)
CREAT SERPL-MCNC: 6.51 MG/DL — HIGH (ref 0.5–1.3)
EOSINOPHIL # BLD AUTO: 0 K/UL — SIGNIFICANT CHANGE UP (ref 0–0.5)
EOSINOPHIL NFR BLD AUTO: 0 % — SIGNIFICANT CHANGE UP (ref 0–6)
GLUCOSE BLDC GLUCOMTR-MCNC: 112 MG/DL — HIGH (ref 70–99)
GLUCOSE BLDC GLUCOMTR-MCNC: 120 MG/DL — HIGH (ref 70–99)
GLUCOSE BLDC GLUCOMTR-MCNC: 138 MG/DL — HIGH (ref 70–99)
GLUCOSE BLDC GLUCOMTR-MCNC: 140 MG/DL — HIGH (ref 70–99)
GLUCOSE SERPL-MCNC: 195 MG/DL — HIGH (ref 70–99)
HAPTOGLOB SERPL-MCNC: 59 MG/DL — SIGNIFICANT CHANGE UP (ref 34–200)
HCT VFR BLD CALC: 22.6 % — LOW (ref 39–50)
HGB BLD-MCNC: 7.6 G/DL — LOW (ref 13–17)
LDH SERPL L TO P-CCNC: 248 U/L — HIGH (ref 50–242)
LYMPHOCYTES # BLD AUTO: 0.59 K/UL — LOW (ref 1–3.3)
LYMPHOCYTES # BLD AUTO: 3.6 % — LOW (ref 13–44)
MAGNESIUM SERPL-MCNC: 1.8 MG/DL — SIGNIFICANT CHANGE UP (ref 1.6–2.6)
MCHC RBC-ENTMCNC: 33.6 GM/DL — SIGNIFICANT CHANGE UP (ref 32–36)
MCHC RBC-ENTMCNC: 33.6 PG — SIGNIFICANT CHANGE UP (ref 27–34)
MCV RBC AUTO: 100 FL — SIGNIFICANT CHANGE UP (ref 80–100)
MONOCYTES # BLD AUTO: 0.72 K/UL — SIGNIFICANT CHANGE UP (ref 0–0.9)
MONOCYTES NFR BLD AUTO: 4.4 % — SIGNIFICANT CHANGE UP (ref 2–14)
NEUTROPHILS # BLD AUTO: 15 K/UL — HIGH (ref 1.8–7.4)
NEUTROPHILS NFR BLD AUTO: 91.1 % — HIGH (ref 43–77)
PHOSPHATE SERPL-MCNC: 4.4 MG/DL — SIGNIFICANT CHANGE UP (ref 2.5–4.5)
PLATELET # BLD AUTO: 258 K/UL — SIGNIFICANT CHANGE UP (ref 150–400)
POTASSIUM SERPL-MCNC: 3.9 MMOL/L — SIGNIFICANT CHANGE UP (ref 3.5–5.3)
POTASSIUM SERPL-SCNC: 3.9 MMOL/L — SIGNIFICANT CHANGE UP (ref 3.5–5.3)
PROT SERPL-MCNC: 6.5 G/DL — SIGNIFICANT CHANGE UP (ref 6–8.3)
RBC # BLD: 2.26 M/UL — LOW (ref 4.2–5.8)
RBC # BLD: 2.26 M/UL — LOW (ref 4.2–5.8)
RBC # FLD: 18.4 % — HIGH (ref 10.3–14.5)
RETICS #: 74.4 K/UL — SIGNIFICANT CHANGE UP (ref 25–125)
RETICS/RBC NFR: 3.3 % — HIGH (ref 0.5–2.5)
SODIUM SERPL-SCNC: 135 MMOL/L — SIGNIFICANT CHANGE UP (ref 135–145)
WBC # BLD: 16.46 K/UL — HIGH (ref 3.8–10.5)
WBC # FLD AUTO: 16.46 K/UL — HIGH (ref 3.8–10.5)

## 2019-12-19 PROCEDURE — 99232 SBSQ HOSP IP/OBS MODERATE 35: CPT

## 2019-12-19 RX ORDER — ERYTHROPOIETIN 10000 [IU]/ML
10000 INJECTION, SOLUTION INTRAVENOUS; SUBCUTANEOUS ONCE
Refills: 0 | Status: COMPLETED | OUTPATIENT
Start: 2019-12-19 | End: 2019-12-19

## 2019-12-19 RX ADMIN — GABAPENTIN 100 MILLIGRAM(S): 400 CAPSULE ORAL at 18:05

## 2019-12-19 RX ADMIN — TRAMADOL HYDROCHLORIDE 50 MILLIGRAM(S): 50 TABLET ORAL at 06:57

## 2019-12-19 RX ADMIN — GABAPENTIN 100 MILLIGRAM(S): 400 CAPSULE ORAL at 05:25

## 2019-12-19 RX ADMIN — ERYTHROPOIETIN 10000 UNIT(S): 10000 INJECTION, SOLUTION INTRAVENOUS; SUBCUTANEOUS at 13:25

## 2019-12-19 RX ADMIN — Medication 25 MILLIGRAM(S): at 18:04

## 2019-12-19 RX ADMIN — VALGANCICLOVIR 450 MILLIGRAM(S): 450 TABLET, FILM COATED ORAL at 05:26

## 2019-12-19 RX ADMIN — SODIUM CHLORIDE 3 MILLILITER(S): 9 INJECTION INTRAMUSCULAR; INTRAVENOUS; SUBCUTANEOUS at 21:33

## 2019-12-19 RX ADMIN — DORZOLAMIDE HYDROCHLORIDE 1 DROP(S): 20 SOLUTION/ DROPS OPHTHALMIC at 13:25

## 2019-12-19 RX ADMIN — CHLORHEXIDINE GLUCONATE 1 APPLICATION(S): 213 SOLUTION TOPICAL at 05:26

## 2019-12-19 RX ADMIN — Medication 500000 UNIT(S): at 23:29

## 2019-12-19 RX ADMIN — Medication 100 MILLIGRAM(S): at 13:25

## 2019-12-19 RX ADMIN — Medication 500000 UNIT(S): at 05:25

## 2019-12-19 RX ADMIN — Medication 500000 UNIT(S): at 18:04

## 2019-12-19 RX ADMIN — Medication 20 MILLIGRAM(S): at 05:26

## 2019-12-19 RX ADMIN — BRIMONIDINE TARTRATE 1 DROP(S): 2 SOLUTION/ DROPS OPHTHALMIC at 05:25

## 2019-12-19 RX ADMIN — TRAMADOL HYDROCHLORIDE 25 MILLIGRAM(S): 50 TABLET ORAL at 14:30

## 2019-12-19 RX ADMIN — SODIUM CHLORIDE 3 MILLILITER(S): 9 INJECTION INTRAMUSCULAR; INTRAVENOUS; SUBCUTANEOUS at 05:28

## 2019-12-19 RX ADMIN — DORZOLAMIDE HYDROCHLORIDE 1 DROP(S): 20 SOLUTION/ DROPS OPHTHALMIC at 05:25

## 2019-12-19 RX ADMIN — TRAMADOL HYDROCHLORIDE 50 MILLIGRAM(S): 50 TABLET ORAL at 21:31

## 2019-12-19 RX ADMIN — SODIUM CHLORIDE 3 MILLILITER(S): 9 INJECTION INTRAMUSCULAR; INTRAVENOUS; SUBCUTANEOUS at 13:08

## 2019-12-19 RX ADMIN — LATANOPROST 1 DROP(S): 0.05 SOLUTION/ DROPS OPHTHALMIC; TOPICAL at 21:30

## 2019-12-19 RX ADMIN — TRAMADOL HYDROCHLORIDE 50 MILLIGRAM(S): 50 TABLET ORAL at 22:30

## 2019-12-19 RX ADMIN — BRIMONIDINE TARTRATE 1 DROP(S): 2 SOLUTION/ DROPS OPHTHALMIC at 13:25

## 2019-12-19 RX ADMIN — BRIMONIDINE TARTRATE 1 DROP(S): 2 SOLUTION/ DROPS OPHTHALMIC at 21:30

## 2019-12-19 RX ADMIN — TRAMADOL HYDROCHLORIDE 25 MILLIGRAM(S): 50 TABLET ORAL at 13:30

## 2019-12-19 RX ADMIN — Medication 1 TABLET(S): at 13:25

## 2019-12-19 RX ADMIN — FAMOTIDINE 20 MILLIGRAM(S): 10 INJECTION INTRAVENOUS at 13:24

## 2019-12-19 RX ADMIN — TRAMADOL HYDROCHLORIDE 50 MILLIGRAM(S): 50 TABLET ORAL at 07:40

## 2019-12-19 RX ADMIN — MYCOPHENOLATE MOFETIL 1 GRAM(S): 250 CAPSULE ORAL at 18:04

## 2019-12-19 RX ADMIN — Medication 500000 UNIT(S): at 13:24

## 2019-12-19 RX ADMIN — DORZOLAMIDE HYDROCHLORIDE 1 DROP(S): 20 SOLUTION/ DROPS OPHTHALMIC at 21:31

## 2019-12-19 RX ADMIN — MYCOPHENOLATE MOFETIL 1 GRAM(S): 250 CAPSULE ORAL at 05:28

## 2019-12-19 RX ADMIN — Medication 50 MILLIGRAM(S): at 18:05

## 2019-12-19 NOTE — PROGRESS NOTE ADULT - SUBJECTIVE AND OBJECTIVE BOX
Transplant Surgery - Multidisciplinary Rounds  --------------------------------------------------------------  DDRT     Date: 12/8/2019      POD #11    Present:   Patient seen and examined with multidisciplinary team including Transplant Surgeon: Dr. Poe, Dr. Espinal, Dr. Cruz.  Transplant Nephrologist: Dr. Tamayo.  Pharmacist: Job Diaz. ACPs: Stefan/Dino, unit RN in am rounds and examined with Dr. Poe.   Disciplines not in attendance will be notified of the plan.     HPI: 49M with significant PMHx of ESRD on HD  (no bx, since 2013 with  TTS via LUE AVF, anuric), HTN, Gout, Glaucoma, NET of pancreas (s/p robotic distal panc/splenectomy at [place] 2015 by Dr. Young, followed by Dr. Raphael, [facility] Oncologist), RA with hand contractures.     Underwent DDRT on 12/8/19 (ureteral stent sutured to Martinez - removed 12/15). Post op course c/b DGF requiring HD, thrombocytopenia, elevated LDH and Hapto < 20, Schistocytes  on peripheral smear concerning for TMA. Envarsus held. Received Belatacept 12/13. Started on PLEX (12/12, 12/15). Underwent renal bx on 12/13 c/w profound ATN.        Recipient info:   CPRA:    o%  ABO:      A+  CMVAb:  Positive  EBVIgG Status:  Positive  Last HD:  12/7/2019    Donor ID:  [id number]  Match: [id number]  OPO: NYRT  Age:  49  ABO:  A  KDPI 81%  COD:  Anoxia  X Clamp Time:  12/7/2019 1538.  Medical Hx: DM, HTN, HLD, obesity BMI 53, CAD, CABAGX3  Terminal Cr:  1/1.8/1.7  CMV- Neg EBV- Neg    OR:    DDRT to right iliac fossa. Simulect induction. Two arteries anastomosed to a single cuff on the back table. One vein, one ureter. Ureteral anastomosis performed over a double J stent, which was sutured to the martinez.   Cold ischemia time 25.5 hours.    Interval Events:    - Afebrile, VS stable with no overnight events  - continued oliguria,  ss    Potential Discharge date: tomorrow    Education: Medications and plan of care    Plan of care: See below      REVIEW OF SYSTEMS  --------------------------------------------------------------------------------  Gen: No weight changes, fatigue, fevers/chills, + weakness  Skin: No rashes  Head/Eyes/Ears/Mouth: No headache; Normal hearing; Normal vision w/o blurriness; No sinus pain/discomfort, sore throat  Respiratory: No dyspnea, cough, wheezing, hemoptysis  CV: No chest pain, PND, orthopnea  GI: No abdominal pain, constipation, nausea, vomiting, melena, hematochezia. no diarrhea  :  No increased frequency, dysuria, hematuria, nocturia  MSK: No joint pain/swelling; no back pain; no edema  Neuro: No dizziness/lightheadedness, weakness, seizures, numbness, tingling  Heme: No easy bruising or bleeding  Endo: No heat/cold intolerance  Psych: No significant nervousness, anxiety, stress, depression  Ext:  c/o RLE edema  All other systems were reviewed and are negative, except as noted.      PHYSICAL EXAM:  Constitutional: Well developed / well nourished  Eyes: Anicteric, PERRLA  ENMT: nc/at, no thrush  Neck: Supple  Respiratory: CTA B/L  Cardiovascular: RRR  Gastrointestinal: Soft abdomen, ND. mild incisional TTP. Incision c/d/i. JPx1 ss.    Genitourinary:  voiding spontaneously, oliguria  Extremities: RLE edema improving, LLE stable. SCD's in place and working bilaterally  Vascular: Palpable dp pulses bilaterally  Neurological: A&O x3  Skin: no new lesions, ulcerations  Musculoskeletal: Moving all extremities  Psychiatric: Responsive

## 2019-12-19 NOTE — PHARMACY EDUCATION NOTE - MEDICATION SAFETY
Miss dose instruction/Adherence/Herbals/Interactions/Side effects/Signs and symptoms to report/Purpose/Storage and handling/Allergies/Medication precautions

## 2019-12-19 NOTE — PROGRESS NOTE ADULT - PROBLEM SELECTOR PLAN 1
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 Switch from CNI to belatacept 12/13 (& 12/17) and plasmapheresis 12/14. U/S neg KATHARINE.  Remains oliguric but electrolytes stable.    - plan HD today per primary nephrologist   - continue MMF 1000 BID and prednisone 10mg daily.  Next dose of Belatacept on day #14  - c/w prophylaxis with Valcyte, Bactrim, and Nystatin.  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  Peripheral smear + schistocytes thrombocytopenia, low haptoglobin/elevated LDH ~TMA. Switch from CNI to belatacept 12/13 (& 12/17) and plasmapheresis 12/14. U/S neg KATHARINE.  Remains oliguric but electrolytes stable.    - plan HD today per primary nephrologist   - continue MMF 1000 BID and prednisone 10mg daily.  Next dose of Belatacept on day #14  - c/w prophylaxis with Valcyte, Bactrim, and Nystatin.  - f/u TMA Complement panel (sent 12/12), Genetic susceptibility panel, DSA (sent 12/12)  - monitor SCr/electrolytes, strict I/O, daily weight

## 2019-12-19 NOTE — PROGRESS NOTE ADULT - ASSESSMENT
49M with significant PMHx of ESRD on HD  (no bx, since 2013 with  TTS via LUE AVF, anuric), HTN, Gout, Glaucoma, NET of pancreas (s/p robotic distal panc/splenectomy at [place] 2015 by Dr. Young, followed by Dr. Raphael, [facility] Oncologist), RA with hand contractures  s/p DDRT on 12/8/19 complicated by DGF requiring HD now with elevated LDH, thrombocytopenia and + schistocytes on smear concerning for TMA.     s/p DDRT POD#11  - Delayed graft function requiring HD. Oliguric.   - 12/19:  Renal u/s with no KATHARINE.  LE dopplers negative for DVT  - Elevated LDH and Thrombocytopenia,  + Schistocytes all concerning for TMA.  S/P 2 sessions PLEX.  Platelets improving , ? related to CNI  - ADAMTS 13      43.5  - Appreciate Heme consult. TMA Complement panel sent 12/12 ([facility]) and  aHUS Genetic susceptibility panel pending ([facility])  - FU DSA sent 12/12  - strict I&Os (JPx1)  - On PRN Tylenol and tramadol for pain    - Immunosuppression: OFF CNI.  Continue MMF 1/1, Pred 10mg daily, Belatacept x 2 (12/13, 12/17)  - PPX: Nystatin, bactrim, Valcyte renally dosed BIW      HTN  -well controlled  -Continue Metoprolol and hydralazine      Dispo  -home tomorrow with HD TTS, teaching today

## 2019-12-19 NOTE — PROGRESS NOTE ADULT - SUBJECTIVE AND OBJECTIVE BOX
[facility] DIVISION OF KIDNEY DISEASES AND HYPERTENSION -- FOLLOW UP NOTE  --------------------------------------------------------------------------------  Chief Complaint:    24 hour events/subjective:  - yesterday had lower ext u/s neg DVT  - overnight no events reported, vitals wnl, total UOP voided 130 cc and BO output 200   - patient seen and examined at bedside this morning without complain, improving lower extremity swelling  - vitals/lab/medications reviewed      Potential Discharge date: tomorrow    PAST HISTORY  --------------------------------------------------------------------------------  No significant changes to PMH, PSH, FHx, SHx, unless otherwise noted    ALLERGIES & MEDICATIONS  --------------------------------------------------------------------------------  Allergies    coconut (Anaphylaxis)  morphine (Pruritus; Rash)    Intolerances      Standing Inpatient Medications  allopurinol 100 milliGRAM(s) Oral daily  brimonidine 0.2% Ophthalmic Solution 1 Drop(s) Left EYE three times a day  chlorhexidine 4% Liquid 1 Application(s) Topical <User Schedule>  dextrose 5%. 1000 milliLiter(s) IV Continuous <Continuous>  dextrose 50% Injectable 12.5 Gram(s) IV Push once  dextrose 50% Injectable 25 Gram(s) IV Push once  dextrose 50% Injectable 25 Gram(s) IV Push once  dorzolamide 2% Ophthalmic Solution 1 Drop(s) Left EYE three times a day  famotidine    Tablet 20 milliGRAM(s) Oral daily  gabapentin 100 milliGRAM(s) Oral two times a day  hydrALAZINE 50 milliGRAM(s) Oral two times a day  insulin lispro (HumaLOG) corrective regimen sliding scale   SubCutaneous three times a day before meals  latanoprost 0.005% Ophthalmic Solution 1 Drop(s) Left EYE at bedtime  metoprolol tartrate 25 milliGRAM(s) Oral two times a day  mycophenolate mofetil 1 Gram(s) Oral every 12 hours  nystatin    Suspension 678687 Unit(s) Swish and Swallow four times a day  predniSONE   Tablet   Oral   sodium chloride 0.9% lock flush 3 milliLiter(s) IV Push every 8 hours  trimethoprim   80 mG/sulfamethoxazole 400 mG 1 Tablet(s) Oral daily  valGANciclovir 450 milliGRAM(s) Oral <User Schedule>    PRN Inpatient Medications  dextrose 40% Gel 15 Gram(s) Oral once PRN  glucagon  Injectable 1 milliGRAM(s) IntraMuscular once PRN  lidocaine 2% Jelly 10 milliLiter(s) IntraUrethral three times a day PRN  ondansetron Injectable 4 milliGRAM(s) IV Push every 6 hours PRN  traMADol 25 milliGRAM(s) Oral every 6 hours PRN  traMADol 50 milliGRAM(s) Oral every 6 hours PRN      REVIEW OF SYSTEMS  --------------------------------------------------------------------------------  Gen: No fatigue, fevers/chills, weakness  Skin: No rashes  Respiratory: No dyspnea, cough  CV: No chest pain, PND, orthopnea  GI: No abdominal pain, diarrhea, constipation, nausea, vomiting  : No increased frequency, dysuria  MSK: + RLE edema  Neuro: No dizziness/lightheadedness, weakness    All other systems were reviewed and are negative, except as noted.    VITALS/PHYSICAL EXAM  --------------------------------------------------------------------------------  T(C): 36.8 (12-19-19 @ 13:00), Max: 36.8 (12-19-19 @ 08:35)  HR: 75 (12-19-19 @ 13:00) (63 - 75)  BP: 169/90 (12-19-19 @ 13:00) (132/76 - 169/90)  RR: 18 (12-19-19 @ 13:00) (18 - 18)  SpO2: 97% (12-19-19 @ 13:00) (97% - 100%)  Wt(kg): --        12-18-19 @ 07:01  -  12-19-19 @ 07:00  --------------------------------------------------------  IN: 1395 mL / OUT: 385 mL / NET: 1010 mL    12-19-19 @ 07:01  -  12-19-19 @ 14:28  --------------------------------------------------------  IN: 440 mL / OUT: 1125 mL / NET: -685 mL      Physical Exam:  	Gen: NAD, well-appearing on room air  	HEENT: clear oropharynx, mmm, supple neck  	Pulm: CTA B/L, no crackles  	CV: RRR, S1S2, no murmur  	Abd: +BS, soft, nondistended, incision c/d/i, R BO drain  	LE: Warm, R ankle edema (improving), RUE AVF +Thrills  	Psych: Normal affect and mood  	Skin: Warm, without rashes  	Vascular access:    LABS/STUDIES  --------------------------------------------------------------------------------              7.6    16.46 >-----------<  258      [12-19-19 @ 09:32]              22.6     135  |  90  |  63  ----------------------------<  195      [12-19-19 @ 09:32]  3.9   |  23  |  6.51        Ca     9.0     [12-19-19 @ 09:32]      Mg     1.8     [12-19-19 @ 09:32]      Phos  4.4     [12-19-19 @ 09:32]    TPro  6.5  /  Alb  3.8  /  TBili  0.6  /  DBili  x   /  AST  45  /  ALT  26  /  AlkPhos  94  [12-19-19 @ 09:32]          [12-19-19 @ 09:32]    Creatinine Trend:  SCr 6.51 [12-19 @ 09:32]  SCr 5.20 [12-18 @ 06:10]  SCr 7.20 [12-17 @ 06:58]  SCr 5.79 [12-16 @ 06:01]  SCr 4.45 [12-15 @ 06:21]        HbA1c 5.0      [12-11-19 @ 08:21]    HBsAg Nonreact      [12-12-19 @ 22:37]  HCV 0.15, Nonreact      [12-12-19 @ 22:37] [facility] DIVISION OF KIDNEY DISEASES AND HYPERTENSION -- FOLLOW UP NOTE  --------------------------------------------------------------------------------  Chief Complaint:    24 hour events/subjective:  - yesterday had lower ext u/s neg DVT  - overnight no events, vitals wnl, total UOP voided 130 cc and BO output 200   - patient seen and examined at bedside this morning without complain, improving lower extremity swelling  - vitals/lab/medications reviewed    PAST HISTORY  --------------------------------------------------------------------------------  No significant changes to PMH, PSH, FHx, SHx, unless otherwise noted    ALLERGIES & MEDICATIONS  --------------------------------------------------------------------------------  Allergies    coconut (Anaphylaxis)  morphine (Pruritus; Rash)    Intolerances      Standing Inpatient Medications  allopurinol 100 milliGRAM(s) Oral daily  brimonidine 0.2% Ophthalmic Solution 1 Drop(s) Left EYE three times a day  chlorhexidine 4% Liquid 1 Application(s) Topical <User Schedule>  dextrose 5%. 1000 milliLiter(s) IV Continuous <Continuous>  dextrose 50% Injectable 12.5 Gram(s) IV Push once  dextrose 50% Injectable 25 Gram(s) IV Push once  dextrose 50% Injectable 25 Gram(s) IV Push once  dorzolamide 2% Ophthalmic Solution 1 Drop(s) Left EYE three times a day  famotidine    Tablet 20 milliGRAM(s) Oral daily  gabapentin 100 milliGRAM(s) Oral two times a day  hydrALAZINE 50 milliGRAM(s) Oral two times a day  insulin lispro (HumaLOG) corrective regimen sliding scale   SubCutaneous three times a day before meals  latanoprost 0.005% Ophthalmic Solution 1 Drop(s) Left EYE at bedtime  metoprolol tartrate 25 milliGRAM(s) Oral two times a day  mycophenolate mofetil 1 Gram(s) Oral every 12 hours  nystatin    Suspension 112417 Unit(s) Swish and Swallow four times a day  predniSONE   Tablet   Oral   sodium chloride 0.9% lock flush 3 milliLiter(s) IV Push every 8 hours  trimethoprim   80 mG/sulfamethoxazole 400 mG 1 Tablet(s) Oral daily  valGANciclovir 450 milliGRAM(s) Oral <User Schedule>    PRN Inpatient Medications  dextrose 40% Gel 15 Gram(s) Oral once PRN  glucagon  Injectable 1 milliGRAM(s) IntraMuscular once PRN  lidocaine 2% Jelly 10 milliLiter(s) IntraUrethral three times a day PRN  ondansetron Injectable 4 milliGRAM(s) IV Push every 6 hours PRN  traMADol 25 milliGRAM(s) Oral every 6 hours PRN  traMADol 50 milliGRAM(s) Oral every 6 hours PRN      REVIEW OF SYSTEMS  --------------------------------------------------------------------------------  Gen: No fatigue, fevers/chills, weakness  Skin: No rashes  Respiratory: No dyspnea, cough  CV: No chest pain, PND, orthopnea  GI: No abdominal pain, diarrhea, constipation, nausea, vomiting  : No increased frequency, dysuria  MSK: + RLE edema  Neuro: No dizziness/lightheadedness, weakness    All other systems were reviewed and are negative, except as noted.    VITALS/PHYSICAL EXAM  --------------------------------------------------------------------------------  T(C): 36.8 (12-19-19 @ 13:00), Max: 36.8 (12-19-19 @ 08:35)  HR: 75 (12-19-19 @ 13:00) (63 - 75)  BP: 169/90 (12-19-19 @ 13:00) (132/76 - 169/90)  RR: 18 (12-19-19 @ 13:00) (18 - 18)  SpO2: 97% (12-19-19 @ 13:00) (97% - 100%)  Wt(kg): --        12-18-19 @ 07:01  -  12-19-19 @ 07:00  --------------------------------------------------------  IN: 1395 mL / OUT: 385 mL / NET: 1010 mL    12-19-19 @ 07:01  -  12-19-19 @ 14:28  --------------------------------------------------------  IN: 440 mL / OUT: 1125 mL / NET: -685 mL      Physical Exam:  	Gen: NAD, well-appearing on room air  	HEENT: clear oropharynx, mmm, supple neck  	Pulm: CTA B/L, no crackles  	CV: RRR, S1S2, no murmur  	Abd: +BS, soft, nondistended, incision c/d/i, R BO drain  	LE: Warm, R ankle edema (improving), RUE AVF +Thrills  	Psych: Normal affect and mood  	Skin: Warm, without rashes  	Vascular access:    LABS/STUDIES  --------------------------------------------------------------------------------              7.6    16.46 >-----------<  258      [12-19-19 @ 09:32]              22.6     135  |  90  |  63  ----------------------------<  195      [12-19-19 @ 09:32]  3.9   |  23  |  6.51        Ca     9.0     [12-19-19 @ 09:32]      Mg     1.8     [12-19-19 @ 09:32]      Phos  4.4     [12-19-19 @ 09:32]    TPro  6.5  /  Alb  3.8  /  TBili  0.6  /  DBili  x   /  AST  45  /  ALT  26  /  AlkPhos  94  [12-19-19 @ 09:32]          [12-19-19 @ 09:32]    Creatinine Trend:  SCr 6.51 [12-19 @ 09:32]  SCr 5.20 [12-18 @ 06:10]  SCr 7.20 [12-17 @ 06:58]  SCr 5.79 [12-16 @ 06:01]  SCr 4.45 [12-15 @ 06:21]        HbA1c 5.0      [12-11-19 @ 08:21]    HBsAg Nonreact      [12-12-19 @ 22:37]  HCV 0.15, Nonreact      [12-12-19 @ 22:37]

## 2019-12-19 NOTE — PROGRESS NOTE ADULT - SUBJECTIVE AND OBJECTIVE BOX
S/p stable HD this am    Vital Signs Last 24 Hrs  T(C): 36.8 (12-19-19 @ 13:00), Max: 36.8 (12-19-19 @ 08:35)  T(F): 98.2 (12-19-19 @ 13:00), Max: 98.3 (12-19-19 @ 08:35)  HR: 75 (12-19-19 @ 13:00) (63 - 75)  BP: 169/90 (12-19-19 @ 13:00) (132/76 - 169/90)  RR: 18 (12-19-19 @ 13:00) (18 - 18)  SpO2: 97% (12-19-19 @ 13:00) (97% - 100%)    I&O's Summary    18 Dec 2019 07:01  -  19 Dec 2019 07:00  --------------------------------------------------------  IN: 1395 mL / OUT: 385 mL / NET: 1010 mL    Card S1S2  Lungs b/l air entry  Abd soft, ND  Extr UE AVF patent                                                         7.6    16.46 )-----------( 258      ( 19 Dec 2019 09:32 )             22.6     19 Dec 2019 09:32    135    |  90     |  63     ----------------------------<  195    3.9     |  23     |  6.51     Ca    9.0        19 Dec 2019 09:32  Phos  4.4       19 Dec 2019 09:32  Mg     1.8       19 Dec 2019 09:32    TPro  6.5    /  Alb  3.8    /  TBili  0.6    /  DBili  x      /  AST  45     /  ALT  26     /  AlkPhos  94     19 Dec 2019 09:32    LIVER FUNCTIONS - ( 19 Dec 2019 09:32 )  Alb: 3.8 g/dL / Pro: 6.5 g/dL / ALK PHOS: 94 U/L / ALT: 26 U/L / AST: 45 U/L / GGT: x           allopurinol 100 milliGRAM(s) Oral daily  brimonidine 0.2% Ophthalmic Solution 1 Drop(s) Left EYE three times a day  chlorhexidine 4% Liquid 1 Application(s) Topical <User Schedule>  dextrose 40% Gel 15 Gram(s) Oral once PRN  dextrose 5%. 1000 milliLiter(s) IV Continuous <Continuous>  dextrose 50% Injectable 12.5 Gram(s) IV Push once  dextrose 50% Injectable 25 Gram(s) IV Push once  dextrose 50% Injectable 25 Gram(s) IV Push once  dorzolamide 2% Ophthalmic Solution 1 Drop(s) Left EYE three times a day  famotidine    Tablet 20 milliGRAM(s) Oral daily  gabapentin 100 milliGRAM(s) Oral two times a day  glucagon  Injectable 1 milliGRAM(s) IntraMuscular once PRN  hydrALAZINE 50 milliGRAM(s) Oral two times a day  insulin lispro (HumaLOG) corrective regimen sliding scale   SubCutaneous three times a day before meals  latanoprost 0.005% Ophthalmic Solution 1 Drop(s) Left EYE at bedtime  lidocaine 2% Jelly 10 milliLiter(s) IntraUrethral three times a day PRN  metoprolol tartrate 25 milliGRAM(s) Oral two times a day  mycophenolate mofetil 1 Gram(s) Oral every 12 hours  nystatin    Suspension 237908 Unit(s) Swish and Swallow four times a day  ondansetron Injectable 4 milliGRAM(s) IV Push every 6 hours PRN  predniSONE   Tablet   Oral   sodium chloride 0.9% lock flush 3 milliLiter(s) IV Push every 8 hours  traMADol 25 milliGRAM(s) Oral every 6 hours PRN  traMADol 50 milliGRAM(s) Oral every 6 hours PRN  trimethoprim   80 mG/sulfamethoxazole 400 mG 1 Tablet(s) Oral daily  valGANciclovir 450 milliGRAM(s) Oral <User Schedule>    A/P:    ESRD on HD  S/p DDRT on 12/8/2019  DGF  S/p transplant kidney bx 12/13: ATN, focal TMA  S/p PLEX 12/13 and 12/15  Eval by Heme/Onc  Immunosuppresion/transplant management per transplant team  Plan for HD 3 x week w/close f/u of UO, Cr  Heparin free  Will follow

## 2019-12-19 NOTE — PROGRESS NOTE ADULT - ASSESSMENT
50 yo M with hx of ESRD on HD, s/p DDRT on 12/8, elevated SCr on HD using AVF, s/p PLEX on 12/12 due to concern for TMA (drop in platelet counts 200s-->40s, schistocytes on PB, elevated , hapto <20, Tbili 1.3,) s/p plasmapheresis 12/14  - renal biopsy 12/13 report showed ATN w/ focal TMA, currently monitor for renal recovery 50 yo M with hx of ESRD on HD, s/p DDRT on 12/8, elevated SCr on HD using AVF, s/p PLEX on 12/12 due to concern for TMA (drop in platelet counts 200s-->40s, schistocytes on PB, elevated , hapto <20, Tbili 1.3,) s/p plasmapheresis 12/14  - renal biopsy 12/13 report showed ATN w/ focal TMA, currently monitor for renal recovery requiring HD

## 2019-12-20 ENCOUNTER — TRANSCRIPTION ENCOUNTER (OUTPATIENT)
Age: 49
End: 2019-12-20

## 2019-12-20 VITALS
HEART RATE: 74 BPM | SYSTOLIC BLOOD PRESSURE: 133 MMHG | TEMPERATURE: 98 F | RESPIRATION RATE: 18 BRPM | OXYGEN SATURATION: 97 % | DIASTOLIC BLOOD PRESSURE: 81 MMHG

## 2019-12-20 LAB
ALBUMIN SERPL ELPH-MCNC: 3.6 G/DL — SIGNIFICANT CHANGE UP (ref 3.3–5)
ALP SERPL-CCNC: 91 U/L — SIGNIFICANT CHANGE UP (ref 40–120)
ALT FLD-CCNC: 30 U/L — SIGNIFICANT CHANGE UP (ref 10–45)
ANION GAP SERPL CALC-SCNC: 15 MMOL/L — SIGNIFICANT CHANGE UP (ref 5–17)
APPEARANCE UR: ABNORMAL
AST SERPL-CCNC: 42 U/L — HIGH (ref 10–40)
BACTERIA # UR AUTO: NEGATIVE — SIGNIFICANT CHANGE UP
BASOPHILS # BLD AUTO: 0.4 K/UL — HIGH (ref 0–0.2)
BASOPHILS NFR BLD AUTO: 2.6 % — HIGH (ref 0–2)
BILIRUB SERPL-MCNC: 0.6 MG/DL — SIGNIFICANT CHANGE UP (ref 0.2–1.2)
BILIRUB UR-MCNC: ABNORMAL
BUN SERPL-MCNC: 47 MG/DL — HIGH (ref 7–23)
CALCIUM SERPL-MCNC: 9.2 MG/DL — SIGNIFICANT CHANGE UP (ref 8.4–10.5)
CHLORIDE SERPL-SCNC: 95 MMOL/L — LOW (ref 96–108)
CO2 SERPL-SCNC: 29 MMOL/L — SIGNIFICANT CHANGE UP (ref 22–31)
COLOR SPEC: ABNORMAL
CREAT SERPL-MCNC: 4.93 MG/DL — HIGH (ref 0.5–1.3)
DIFF PNL FLD: ABNORMAL
EOSINOPHIL # BLD AUTO: 0 K/UL — SIGNIFICANT CHANGE UP (ref 0–0.5)
EOSINOPHIL NFR BLD AUTO: 0 % — SIGNIFICANT CHANGE UP (ref 0–6)
EPI CELLS # UR: 2 — SIGNIFICANT CHANGE UP
GLUCOSE BLDC GLUCOMTR-MCNC: 96 MG/DL — SIGNIFICANT CHANGE UP (ref 70–99)
GLUCOSE BLDC GLUCOMTR-MCNC: 98 MG/DL — SIGNIFICANT CHANGE UP (ref 70–99)
GLUCOSE SERPL-MCNC: 98 MG/DL — SIGNIFICANT CHANGE UP (ref 70–99)
GLUCOSE UR QL: ABNORMAL
GRAN CASTS # UR COMP ASSIST: 1 /LPF — SIGNIFICANT CHANGE UP
HAPTOGLOB SERPL-MCNC: 50 MG/DL — SIGNIFICANT CHANGE UP (ref 34–200)
HCT VFR BLD CALC: 23.1 % — LOW (ref 39–50)
HGB BLD-MCNC: 7.3 G/DL — LOW (ref 13–17)
HYALINE CASTS # UR AUTO: 11 /LPF — HIGH (ref 0–7)
KETONES UR-MCNC: SIGNIFICANT CHANGE UP
LDH SERPL L TO P-CCNC: 243 U/L — HIGH (ref 50–242)
LEUKOCYTE ESTERASE UR-ACNC: ABNORMAL
LYMPHOCYTES # BLD AUTO: 17.4 % — SIGNIFICANT CHANGE UP (ref 13–44)
LYMPHOCYTES # BLD AUTO: 2.67 K/UL — SIGNIFICANT CHANGE UP (ref 1–3.3)
MAGNESIUM SERPL-MCNC: 1.8 MG/DL — SIGNIFICANT CHANGE UP (ref 1.6–2.6)
MCHC RBC-ENTMCNC: 31.6 GM/DL — LOW (ref 32–36)
MCHC RBC-ENTMCNC: 32.4 PG — SIGNIFICANT CHANGE UP (ref 27–34)
MCV RBC AUTO: 102.7 FL — HIGH (ref 80–100)
MONOCYTES # BLD AUTO: 1.06 K/UL — HIGH (ref 0–0.9)
MONOCYTES NFR BLD AUTO: 6.9 % — SIGNIFICANT CHANGE UP (ref 2–14)
NEUTROPHILS # BLD AUTO: 11.09 K/UL — HIGH (ref 1.8–7.4)
NEUTROPHILS NFR BLD AUTO: 72.2 % — SIGNIFICANT CHANGE UP (ref 43–77)
NITRITE UR-MCNC: NEGATIVE — SIGNIFICANT CHANGE UP
PH UR: 7 — SIGNIFICANT CHANGE UP (ref 5–8)
PHOSPHATE SERPL-MCNC: 3.7 MG/DL — SIGNIFICANT CHANGE UP (ref 2.5–4.5)
PLATELET # BLD AUTO: 244 K/UL — SIGNIFICANT CHANGE UP (ref 150–400)
POTASSIUM SERPL-MCNC: 4.1 MMOL/L — SIGNIFICANT CHANGE UP (ref 3.5–5.3)
POTASSIUM SERPL-SCNC: 4.1 MMOL/L — SIGNIFICANT CHANGE UP (ref 3.5–5.3)
PROT SERPL-MCNC: 6.2 G/DL — SIGNIFICANT CHANGE UP (ref 6–8.3)
PROT UR-MCNC: ABNORMAL
RBC # BLD: 2.25 M/UL — LOW (ref 4.2–5.8)
RBC # BLD: 2.25 M/UL — LOW (ref 4.2–5.8)
RBC # FLD: 18.5 % — HIGH (ref 10.3–14.5)
RBC CASTS # UR COMP ASSIST: 1370 /HPF — HIGH (ref 0–4)
RETICS #: 62.8 K/UL — SIGNIFICANT CHANGE UP (ref 25–125)
RETICS/RBC NFR: 2.8 % — HIGH (ref 0.5–2.5)
SODIUM SERPL-SCNC: 139 MMOL/L — SIGNIFICANT CHANGE UP (ref 135–145)
SP GR SPEC: 1.03 — HIGH (ref 1.01–1.02)
UROBILINOGEN FLD QL: ABNORMAL
WBC # BLD: 15.36 K/UL — HIGH (ref 3.8–10.5)
WBC # FLD AUTO: 15.36 K/UL — HIGH (ref 3.8–10.5)
WBC UR QL: 465 /HPF — HIGH (ref 0–5)

## 2019-12-20 PROCEDURE — 85385 FIBRINOGEN ANTIGEN: CPT

## 2019-12-20 PROCEDURE — C1889: CPT

## 2019-12-20 PROCEDURE — 84132 ASSAY OF SERUM POTASSIUM: CPT

## 2019-12-20 PROCEDURE — 50200 RENAL BIOPSY PERQ: CPT

## 2019-12-20 PROCEDURE — 90620 MENB-4C VACCINE IM: CPT

## 2019-12-20 PROCEDURE — 99261: CPT

## 2019-12-20 PROCEDURE — P9059: CPT

## 2019-12-20 PROCEDURE — 85730 THROMBOPLASTIN TIME PARTIAL: CPT

## 2019-12-20 PROCEDURE — 85397 CLOTTING FUNCT ACTIVITY: CPT

## 2019-12-20 PROCEDURE — 82435 ASSAY OF BLOOD CHLORIDE: CPT

## 2019-12-20 PROCEDURE — 85027 COMPLETE CBC AUTOMATED: CPT

## 2019-12-20 PROCEDURE — 85384 FIBRINOGEN ACTIVITY: CPT

## 2019-12-20 PROCEDURE — 82565 ASSAY OF CREATININE: CPT

## 2019-12-20 PROCEDURE — P9017: CPT

## 2019-12-20 PROCEDURE — 99232 SBSQ HOSP IP/OBS MODERATE 35: CPT

## 2019-12-20 PROCEDURE — 88313 SPECIAL STAINS GROUP 2: CPT

## 2019-12-20 PROCEDURE — P9037: CPT

## 2019-12-20 PROCEDURE — 85014 HEMATOCRIT: CPT

## 2019-12-20 PROCEDURE — 87507 IADNA-DNA/RNA PROBE TQ 12-25: CPT

## 2019-12-20 PROCEDURE — 80074 ACUTE HEPATITIS PANEL: CPT

## 2019-12-20 PROCEDURE — 82330 ASSAY OF CALCIUM: CPT

## 2019-12-20 PROCEDURE — 86900 BLOOD TYPING SEROLOGIC ABO: CPT

## 2019-12-20 PROCEDURE — 84295 ASSAY OF SERUM SODIUM: CPT

## 2019-12-20 PROCEDURE — 86022 PLATELET ANTIBODIES: CPT

## 2019-12-20 PROCEDURE — P9011: CPT

## 2019-12-20 PROCEDURE — 84100 ASSAY OF PHOSPHORUS: CPT

## 2019-12-20 PROCEDURE — C1751: CPT

## 2019-12-20 PROCEDURE — 80197 ASSAY OF TACROLIMUS: CPT

## 2019-12-20 PROCEDURE — 87045 FECES CULTURE AEROBIC BACT: CPT

## 2019-12-20 PROCEDURE — 82947 ASSAY GLUCOSE BLOOD QUANT: CPT

## 2019-12-20 PROCEDURE — 93005 ELECTROCARDIOGRAM TRACING: CPT

## 2019-12-20 PROCEDURE — 83605 ASSAY OF LACTIC ACID: CPT

## 2019-12-20 PROCEDURE — 86901 BLOOD TYPING SEROLOGIC RH(D): CPT

## 2019-12-20 PROCEDURE — 88346 IMFLUOR 1ST 1ANTB STAIN PX: CPT

## 2019-12-20 PROCEDURE — 86923 COMPATIBILITY TEST ELECTRIC: CPT

## 2019-12-20 PROCEDURE — 83735 ASSAY OF MAGNESIUM: CPT

## 2019-12-20 PROCEDURE — 76942 ECHO GUIDE FOR BIOPSY: CPT

## 2019-12-20 PROCEDURE — 88350 IMFLUOR EA ADDL 1ANTB STN PX: CPT

## 2019-12-20 PROCEDURE — 76776 US EXAM K TRANSPL W/DOPPLER: CPT

## 2019-12-20 PROCEDURE — 82962 GLUCOSE BLOOD TEST: CPT

## 2019-12-20 PROCEDURE — 83010 ASSAY OF HAPTOGLOBIN QUANT: CPT

## 2019-12-20 PROCEDURE — 88305 TISSUE EXAM BY PATHOLOGIST: CPT

## 2019-12-20 PROCEDURE — 80048 BASIC METABOLIC PNL TOTAL CA: CPT

## 2019-12-20 PROCEDURE — 83036 HEMOGLOBIN GLYCOSYLATED A1C: CPT

## 2019-12-20 PROCEDURE — 86850 RBC ANTIBODY SCREEN: CPT

## 2019-12-20 PROCEDURE — C2617: CPT

## 2019-12-20 PROCEDURE — 88342 IMHCHEM/IMCYTCHM 1ST ANTB: CPT

## 2019-12-20 PROCEDURE — 90734 MENACWYD/MENACWYCRM VACC IM: CPT

## 2019-12-20 PROCEDURE — 93970 EXTREMITY STUDY: CPT

## 2019-12-20 PROCEDURE — 87046 STOOL CULTR AEROBIC BACT EA: CPT

## 2019-12-20 PROCEDURE — 36514 APHERESIS PLASMA: CPT

## 2019-12-20 PROCEDURE — 71045 X-RAY EXAM CHEST 1 VIEW: CPT

## 2019-12-20 PROCEDURE — 88348 ELECTRON MICROSCOPY DX: CPT

## 2019-12-20 PROCEDURE — P9040: CPT

## 2019-12-20 PROCEDURE — 88312 SPECIAL STAINS GROUP 1: CPT

## 2019-12-20 PROCEDURE — 82803 BLOOD GASES ANY COMBINATION: CPT

## 2019-12-20 PROCEDURE — 82570 ASSAY OF URINE CREATININE: CPT

## 2019-12-20 PROCEDURE — 85610 PROTHROMBIN TIME: CPT

## 2019-12-20 PROCEDURE — 83615 LACTATE (LD) (LDH) ENZYME: CPT

## 2019-12-20 PROCEDURE — 87070 CULTURE OTHR SPECIMN AEROBIC: CPT

## 2019-12-20 PROCEDURE — 85045 AUTOMATED RETICULOCYTE COUNT: CPT

## 2019-12-20 PROCEDURE — 80053 COMPREHEN METABOLIC PANEL: CPT

## 2019-12-20 PROCEDURE — 36430 TRANSFUSION BLD/BLD COMPNT: CPT

## 2019-12-20 PROCEDURE — 81001 URINALYSIS AUTO W/SCOPE: CPT

## 2019-12-20 RX ORDER — DORZOLAMIDE HYDROCHLORIDE 20 MG/ML
1 SOLUTION/ DROPS OPHTHALMIC
Qty: 0 | Refills: 0 | DISCHARGE
Start: 2019-12-20

## 2019-12-20 RX ORDER — MAGNESIUM SULFATE 500 MG/ML
2 VIAL (ML) INJECTION ONCE
Refills: 0 | Status: COMPLETED | OUTPATIENT
Start: 2019-12-20 | End: 2019-12-20

## 2019-12-20 RX ORDER — NYSTATIN 500MM UNIT
5 POWDER (EA) MISCELLANEOUS
Qty: 0 | Refills: 0 | DISCHARGE
Start: 2019-12-20

## 2019-12-20 RX ORDER — GABAPENTIN 400 MG/1
1 CAPSULE ORAL
Qty: 0 | Refills: 0 | DISCHARGE
Start: 2019-12-20

## 2019-12-20 RX ORDER — ERYTHROPOIETIN 10000 [IU]/ML
15000 INJECTION, SOLUTION INTRAVENOUS; SUBCUTANEOUS
Refills: 0 | Status: DISCONTINUED | OUTPATIENT
Start: 2019-12-20 | End: 2019-12-20

## 2019-12-20 RX ORDER — ERYTHROPOIETIN 10000 [IU]/ML
1 INJECTION, SOLUTION INTRAVENOUS; SUBCUTANEOUS
Qty: 0 | Refills: 0 | DISCHARGE
Start: 2019-12-20

## 2019-12-20 RX ORDER — ERYTHROPOIETIN 10000 [IU]/ML
20000 INJECTION, SOLUTION INTRAVENOUS; SUBCUTANEOUS
Refills: 0 | Status: DISCONTINUED | OUTPATIENT
Start: 2019-12-21 | End: 2019-12-20

## 2019-12-20 RX ORDER — VALGANCICLOVIR 450 MG/1
1 TABLET, FILM COATED ORAL
Qty: 0 | Refills: 0 | DISCHARGE
Start: 2019-12-20

## 2019-12-20 RX ORDER — BRIMONIDINE TARTRATE 2 MG/MG
1 SOLUTION/ DROPS OPHTHALMIC
Qty: 0 | Refills: 0 | DISCHARGE
Start: 2019-12-20

## 2019-12-20 RX ORDER — FAMOTIDINE 10 MG/ML
1 INJECTION INTRAVENOUS
Qty: 0 | Refills: 0 | DISCHARGE
Start: 2019-12-20

## 2019-12-20 RX ORDER — HYDRALAZINE HCL 50 MG
1 TABLET ORAL
Qty: 0 | Refills: 0 | DISCHARGE
Start: 2019-12-20

## 2019-12-20 RX ORDER — MYCOPHENOLATE MOFETIL 250 MG/1
2 CAPSULE ORAL
Qty: 0 | Refills: 0 | DISCHARGE
Start: 2019-12-20

## 2019-12-20 RX ADMIN — TRAMADOL HYDROCHLORIDE 25 MILLIGRAM(S): 50 TABLET ORAL at 08:55

## 2019-12-20 RX ADMIN — Medication 100 MILLIGRAM(S): at 12:21

## 2019-12-20 RX ADMIN — Medication 50 GRAM(S): at 07:52

## 2019-12-20 RX ADMIN — Medication 1 TABLET(S): at 12:21

## 2019-12-20 RX ADMIN — Medication 10 MILLIGRAM(S): at 05:38

## 2019-12-20 RX ADMIN — MYCOPHENOLATE MOFETIL 1 GRAM(S): 250 CAPSULE ORAL at 05:40

## 2019-12-20 RX ADMIN — GABAPENTIN 100 MILLIGRAM(S): 400 CAPSULE ORAL at 05:38

## 2019-12-20 RX ADMIN — Medication 500000 UNIT(S): at 05:38

## 2019-12-20 RX ADMIN — BRIMONIDINE TARTRATE 1 DROP(S): 2 SOLUTION/ DROPS OPHTHALMIC at 05:37

## 2019-12-20 RX ADMIN — DORZOLAMIDE HYDROCHLORIDE 1 DROP(S): 20 SOLUTION/ DROPS OPHTHALMIC at 05:38

## 2019-12-20 RX ADMIN — FAMOTIDINE 20 MILLIGRAM(S): 10 INJECTION INTRAVENOUS at 12:21

## 2019-12-20 RX ADMIN — Medication 500000 UNIT(S): at 12:21

## 2019-12-20 RX ADMIN — BRIMONIDINE TARTRATE 1 DROP(S): 2 SOLUTION/ DROPS OPHTHALMIC at 13:22

## 2019-12-20 RX ADMIN — SODIUM CHLORIDE 3 MILLILITER(S): 9 INJECTION INTRAMUSCULAR; INTRAVENOUS; SUBCUTANEOUS at 13:07

## 2019-12-20 RX ADMIN — DORZOLAMIDE HYDROCHLORIDE 1 DROP(S): 20 SOLUTION/ DROPS OPHTHALMIC at 13:22

## 2019-12-20 RX ADMIN — SODIUM CHLORIDE 3 MILLILITER(S): 9 INJECTION INTRAMUSCULAR; INTRAVENOUS; SUBCUTANEOUS at 05:41

## 2019-12-20 RX ADMIN — TRAMADOL HYDROCHLORIDE 25 MILLIGRAM(S): 50 TABLET ORAL at 09:55

## 2019-12-20 RX ADMIN — TRAMADOL HYDROCHLORIDE 50 MILLIGRAM(S): 50 TABLET ORAL at 13:31

## 2019-12-20 NOTE — DISCHARGE NOTE NURSING/CASE MANAGEMENT/SOCIAL WORK - PATIENT PORTAL LINK FT
You can access the FollowMyHealth Patient Portal offered by [facility] by registering at the following website: [email or web address]. By joining [employer]’s FollowMyHealth portal, you will also be able to view your health information using other applications (apps) compatible with our system.

## 2019-12-20 NOTE — PROGRESS NOTE ADULT - SUBJECTIVE AND OBJECTIVE BOX
[facility] DIVISION OF KIDNEY DISEASES AND HYPERTENSION -- FOLLOW UP NOTE  --------------------------------------------------------------------------------  Chief Complaint:    24 hour events/subjective:  - yesterday had HD session (removed 1L), ordered for Epogen w/ HD.  - overnight no events reported, vitals wnl, total UOP voided 130 cc  - patient seen and examined at bedside this morning without complain  - vitals/lab/medications reviewed, electrolytes wnl          PAST HISTORY  --------------------------------------------------------------------------------  No significant changes to PMH, PSH, FHx, SHx, unless otherwise noted    ALLERGIES & MEDICATIONS  --------------------------------------------------------------------------------  Allergies    coconut (Anaphylaxis)  morphine (Pruritus; Rash)    Intolerances      Standing Inpatient Medications  allopurinol 100 milliGRAM(s) Oral daily  brimonidine 0.2% Ophthalmic Solution 1 Drop(s) Left EYE three times a day  dextrose 5%. 1000 milliLiter(s) IV Continuous <Continuous>  dextrose 50% Injectable 12.5 Gram(s) IV Push once  dextrose 50% Injectable 25 Gram(s) IV Push once  dextrose 50% Injectable 25 Gram(s) IV Push once  dorzolamide 2% Ophthalmic Solution 1 Drop(s) Left EYE three times a day  famotidine    Tablet 20 milliGRAM(s) Oral daily  gabapentin 100 milliGRAM(s) Oral two times a day  hydrALAZINE 50 milliGRAM(s) Oral two times a day  insulin lispro (HumaLOG) corrective regimen sliding scale   SubCutaneous three times a day before meals  latanoprost 0.005% Ophthalmic Solution 1 Drop(s) Left EYE at bedtime  metoprolol tartrate 25 milliGRAM(s) Oral two times a day  mycophenolate mofetil 1 Gram(s) Oral every 12 hours  nystatin    Suspension [id number] Unit(s) Swish and Swallow four times a day  predniSONE   Tablet   Oral   predniSONE   Tablet 10 milliGRAM(s) Oral daily  sodium chloride 0.9% lock flush 3 milliLiter(s) IV Push every 8 hours  trimethoprim   80 mG/sulfamethoxazole 400 mG 1 Tablet(s) Oral daily  valGANciclovir 450 milliGRAM(s) Oral <User Schedule>    PRN Inpatient Medications  dextrose 40% Gel 15 Gram(s) Oral once PRN  glucagon  Injectable 1 milliGRAM(s) IntraMuscular once PRN  lidocaine 2% Jelly 10 milliLiter(s) IntraUrethral three times a day PRN  ondansetron Injectable 4 milliGRAM(s) IV Push every 6 hours PRN  traMADol 25 milliGRAM(s) Oral every 6 hours PRN  traMADol 50 milliGRAM(s) Oral every 6 hours PRN      REVIEW OF SYSTEMS  Gen: No fatigue, fevers/chills, weakness  Skin: No rashes  Respiratory: No dyspnea, cough  CV: No chest pain, PND, orthopnea  GI: No abdominal pain, diarrhea, constipation, nausea, vomiting  : No increased frequency, dysuria  MSK: + RLE edema (improving)  Neuro: No dizziness/lightheadedness, weakness  All other systems were reviewed and are negative, except as noted.      VITALS/PHYSICAL EXAM  --------------------------------------------------------------------------------  T(C): 36.7 (12-20-19 @ 13:00), Max: 37.2 (12-19-19 @ 21:00)  HR: 74 (12-20-19 @ 13:00) (64 - 76)  BP: 133/81 (12-20-19 @ 13:00) (119/65 - 133/81)  RR: 18 (12-20-19 @ 13:00) (18 - 18)  SpO2: 97% (12-20-19 @ 13:00) (97% - 100%)  Wt(kg): --        12-19-19 @ 07:01  -  12-20-19 @ 07:00  --------------------------------------------------------  IN: 1055 mL / OUT: 1340 mL / NET: -285 mL    12-20-19 @ 07:01  -  12-20-19 @ 14:47  --------------------------------------------------------  IN: 480 mL / OUT: 70 mL / NET: 410 mL      Physical Exam:  	Gen: NAD, well-appearing on room air  	HEENT: clear oropharynx, mmm, supple neck  	Pulm: CTA B/L, no crackles  	CV: RRR, S1S2, no murmur  	Abd: +BS, soft, nondistended, incision c/d/i, R BO drain  	LE: Warm, R ankle edema (improving), RUE AVF +Thrills  	Psych: Normal affect and mood  	Skin: Warm, without rashes  	Vascular access:    LABS/STUDIES  --------------------------------------------------------------------------------              7.3    15.36 >-----------<  244      [12-20-19 @ 06:36]              23.1     139  |  95  |  47  ----------------------------<  98      [12-20-19 @ 06:36]  4.1   |  29  |  4.93        Ca     9.2     [12-20-19 @ 06:36]      Mg     1.8     [12-20-19 @ 06:36]      Phos  3.7     [12-20-19 @ 06:36]    TPro  6.2  /  Alb  3.6  /  TBili  0.6  /  DBili  x   /  AST  42  /  ALT  30  /  AlkPhos  91  [12-20-19 @ 06:36]              [12-20-19 @ 06:36]    Creatinine Trend:  SCr 4.93 [12-20 @ 06:36]  SCr 6.51 [12-19 @ 09:32]  SCr 5.20 [12-18 @ 06:10]  SCr 7.20 [12-17 @ 06:58]  SCr 5.79 [12-16 @ 06:01]    Urinalysis - [12-20-19 @ 02:10]      Color Orange / Appearance Turbid / SG 1.027 / pH 7.0      Gluc 100 mg/dL / Ketone Trace  / Bili Small / Urobili 2 mg/dL       Blood Large / Protein 100 mg/dL / Leuk Est Large / Nitrite Negative      RBC 1370 /  / Hyaline 11 / Gran 1 / Sq Epi  / Non Sq Epi 2 / Bacteria Negative      HbA1c 5.0      [12-11-19 @ 08:21]    HBsAg Nonreact      [12-12-19 @ 22:37]  HCV 0.15, Nonreact      [12-12-19 @ 22:37]

## 2019-12-20 NOTE — PROGRESS NOTE ADULT - PROBLEM SELECTOR PLAN 1
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  Peripheral smear + schistocytes thrombocytopenia, low haptoglobin/elevated LDH ~TMA. Switch from CNI to belatacept 12/13 (& 12/17) and plasmapheresis 12/14. U/S neg KATHARINE.  Remains oliguric but electrolytes stable.    - continue MMF 1000 BID and prednisone 10mg daily.  Next dose of Belatacept on day #14  - c/w prophylaxis with Valcyte, Bactrim, and Nystatin.  - f/u TMA Complement panel (sent 12/12), Genetic susceptibility panel, DSA (sent 12/12)  - monitor SCr/electrolytes, strict I/O, daily weight

## 2019-12-20 NOTE — DISCHARGE NOTE PROVIDER - NSDCFUADDAPPT_GEN_ALL_CORE_FT
1. Please call to make a follow-up appointment with Dr. Espinal/ [place] on 12/24/19  2. Please follow up with your primary care physician in one week regarding your hospitalization.

## 2019-12-20 NOTE — CHART NOTE - NSCHARTNOTEFT_GEN_A_CORE
Hematology team consulted during this admission to r/o TTP/ tacrolimus induced TMA. [id number] was sent, which was not low and TTP was ruled out. 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 Platelets now have completely normalized after the medication was stopped. Rest of care per transplant team.     Spoke with Transplant team and will sign off at this time.     Please call back with any questions or concerns.     Tori Sierra MD  Hematology Oncology Fellow, PGY-4  [facility] Pager: [phone]/ [facility] Pager: [phone]

## 2019-12-20 NOTE — DISCHARGE NOTE PROVIDER - NSDCCPCAREPLAN_GEN_ALL_CORE_FT
PRINCIPAL DISCHARGE DIAGNOSIS  Diagnosis: Renal transplant recipient  Assessment and Plan of Treatment:   No heavy lifting anything more than 10-15lbs or straining. Otherwise, you may return to your usual level of physical activity. If you are taking narcotic pain medication (such as Percocet), do NOT drive a car, operate machinery or make important decisions.  Call transplant clinic If you developed any of the following, fever, pain, redness, swelling at incision site, cough, nausea, vomiting, painful urination, difficulty urination, or not making any urine.  NOTIFY YOUR SURGEON IF: You have any bleeding that does not stop, any pus draining from your wound, any fever (over 100.4 F) or chills, persistent nausea/vomiting with inability to tolerate food or liquids, persistent diarrhea, or if your pain is not controlled on your discharge pain medications.        SECONDARY DISCHARGE DIAGNOSES  Diagnosis: Delayed renal graft function  Assessment and Plan of Treatment: You will require dialysis as an outpatient three times a week Tues, Thurs, Sat.  Your blood work will be monitored and dialysis will be adjusted as transplant kidney numbers improve

## 2019-12-20 NOTE — PROGRESS NOTE ADULT - ATTENDING COMMENTS
Belatacept today
Envarsus on hold  Belatacept given 12/13
Immunosuppression levels reviewed. Continue to hold FK. Discontinue FK level checks.  Belatacept 12/13, will address weekly.
Immunosuppression: no CNIs.  Belatacept 12/17, will continue as outpatient over the next few weeks.  Continue Prednisone 10QD, MMF 1000/1000.  Simulect completed POD#4.
rising LDH, decreasing platelets, low haptoglobin - concern for thrombotic microangiopathy    Plan  1)DSA  2)biopsy kidney (platelet, DDAVP coverage)  3)stop envarsus  4)plan for plasmapharesis tomorrow  5)complement mutation study  6)[id number] antibody
s/p DDRT with DGF  biopsy showing profound ATN, some sclerosis  ?TMA, but platelets, hgb, LDH and haptoglobin all improving after envarsus held and plasmapheresis x2.  Cont to hold envarsus/prograf. To receive second dose belatacept tomorrow. Cont prednisone and cellcept  prophylaxis with nystatin, bactrim, and valcyte  plan for HD tomorrow  monitor electrolytes and urine output, which has increased a little
s/p DDRT with poor renal function, DGF, thrombocytopenia and anemia. Concern for TMA  f/u workup/lab results  kidney biopsy today. transfused platelets this am  Immunosuppression - Envarsus held; Cont prednisone, cellcept  transfuse RBC prn  plan for HD tomorrow, possible plasmapheresis Sunday
Agree with above. Pt evaluated at bedside.   s/p kidney bx today, s/p PLEX yesterday   plts increased today. Some hematuria noted so PLEX held today   LDH trending down   Tacro d/c   [id number] pending.   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
50 y/o man with ESRD due to HTN s/p DDRT on 12/8/19 with Simulect induction. Donor was 50 y/o with anoxic injury, CIT 25 hrs, KDPI 81%.   Post transplant course complicated by DGF requiring dialysis. He developed TMA with hemolytic anemia, schistocytes, low haptoglobin & elevated LDH. Tacrolimus was discontinued and he was given belatacept. He underwent plasmapheresis on 12/13 and on 12/15.   Hemolysis has resolved and thrombocytopenia has recovered. Kidney biopsy with extensive ATN and focal TMA involving 2 glomeruli. C4D negative.     No major events overnight. Seen on dialysis this AM, doing well.   UOP 155ml/24 hours.   Hgb and Platelets stable.    Impression & Plan   Delayed graft function due to ATN. Volume status and electrolytes are fair. UOP remains marginal. HD done this AM.   TMA likely tacrolimus induced. No evidence of ongoing hemolysis. Kidney path showing mild focal thrombosis, most significant finding is ATN.  Immunosuppression - belatacept 2nd dose today, continue Cellcept 1gm BID, prednisone taper - maintain on 10mg daily to reduce risk of rejection since he is off CNI.   Continue HD per primary team. Monitor UOP for graft function recovery
Delayed graft function, HD requiring  1.  Renal failure--delayed graft function vs calcineurin toxicity vs other.  For bx.  Empiric switch prograf-->belatacept.  HD requiring t present  2. Immunosuppression, renal transplant.  See 1, continue steroids, antimetabolite  3. Kidney transplant--still HD requiring  4.  Htn, med, volume optimization on HD
Kidney Transplant recipient with Delayed allograft function  Oliguric, elevated creatinine  Electrolyte abnormalities noted  Reviewed allograft function, prophylaxis regimen and immunosuppression  Reviewed antihypertensive regimen  Plan:  hemodialysis- prescription reviewed.  Tacrolimus trough level 8-10 ng/ml  Will follow  Spoke to patient's primary nephrologist and covering team member  I was present during and reviewed clinical and lab data as well as assessment and plan as documented by the housestaff as noted. Please contact if any additional questions with any change in clinical condition or on availability of any additional information or reports.
Kidney recipient with delayed graft function, elevated creatinine, oliguria,   Noted low platelet count and elevated LDH  reviewed immunosuppression, allograft function, prophylaxis regimen and antihypertensive regimen  Plan:  Evaluate PBF for Schistocytes   Check DSA  Allograft sonogram with doppler  Will do allograft biopsy with platelet infusion to evaluate allograft dysfunction  Tacrolimus Trough target level 8-10 ng/ml  Will follow  I was present during and reviewed clinical and lab data as well as assessment and plan as documented by the housestaff as noted. Please contact if any additional questions with any change in clinical condition or on availability of any additional information or reports.
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 Please contact if any additional questions with any change in clinical condition or on availability of any additional information or reports.
Kidney transplant recipient with delayed functioning renal allograft  oliguric, elevated creatinine,  HTN   Reviewed immunosuppression, prophylaxis regimen and allograft function  Reviewed  antihypertensive regimen  reviewed events,. clinical and lab data  Plan:  Tacrolimus target level 8-10 ng/ml  Hemodialysis support,  being managed by pr nephrologist  Will follow  I was present during and reviewed clinical and lab data as well as assessment and plan as documented by the house staff as noted. Please contact if any additional questions with any change in clinical condition or on availability of any additional information or reports.
Kidney transplant recipient with oliguria, allograft function noted  Noted low urine output despite diuretic challenge  Hypertension  Reviewed immunosuppression, prophylaxis, antihypertensive  regimen,   Plan:  Continue current immunosuppression, Tacrolimus Target 8-10 ng/ml  Hemodialysis as needed, if remains oliguric, in AM  Monitor urine out put  I was present during and reviewed clinical and lab data as well as assessment and plan as documented by the house staff as noted. Please contact if any additional questions with any change in clinical condition or on availability of any additional information or reports.
48 y/o man with ESRD due to HTN s/p DDRT on 12/8/19 with Simulect induction. Donor was 48 y/o with anoxic injury, CIT 25 hrs, KDPI 81%.   Post transplant course complicated by DGF requiring dialysis. 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 Tacrolimus was discontinued and he was given belatacept. He underwent plasmapheresis on 12/13 and on 12/15.   No acute events overnight. Remains oliguric ~ 240ml/24 hours. Hemodynamically stable.   Labs with stable Hgb 8.0, improved platelet 163 today,  and Haptoglobin 86.  [id number] level not suppressed hence not TTP.   Tx ultrasound (12/9/19) - 2 renal arteries, homogenous flow, no significant stenosis, no hydro.   Path images reviewed with Dr. Barry - extensive ATN present, focal TMA involving 2 of 21 glomeruli. No rejection. C4d negative.     Impression & Plan   Delayed graft function due to ATN. Volume status and electrolytes are fair. UOP remains marginal. No need for dialysis today. Continue to monitor.  TMA likely tacrolimus induced. No evidence of ongoing hemolysis. Kidney path showing mild focal thrombosis, most significant finding is ATN. No need for eculizumab. Will hold off on further plasmapheresis as response is less likely to be due to pheresis. Discussed with surgical team and Dr. Lockett.   Immunosuppression - belatacept 2nd dose tomorrow, continue Cellcept 1gm BID, prednisone taper
48 y/o man with ESRD due to HTN s/p DDRT on 12/8/19 with Simulect induction. Donor was 49 y/o with anoxic injury, CIT 25 hrs, KDPI 81%.   Post transplant course complicated by DGF requiring dialysis and TMA likely tacrolimus induced. Tacrolimus d/ezra and he is started on belatacept. Kidney biopsy with extensive ATN and focal TMA.     No major events overnight. UOP 130ml/24 hours.    Lungs clear, abdomen soft and non tender, LE edema R>L unchanged     Labs reviewed. WBC 15.3 improving, Hgb 7.3, Hct 23.1, slowly declining, Plt 244 stable.      Delayed graft function due to ATN. Volume status and electrolytes are fair. UOP remains marginal. Continue HD per primary nephrology   Tacrolimus induced TMA, stable off tacrolimus. Belatacept 2nd dose given 12/17.  Next dose on 12/26  outpatient.   Continue Cellcept 1gm po bid.  Prednisone 10mg daily . Monitor closely for rejection since he is off CNI   Continue procrit 20,000 units 3x/week for anemia of CKD   Prophylaxis : continue bactrim, Valcyte , nystatin   D/c planning - home with outpatient dialysis  F/u in transplant clinic next week
50 y/o man with ESRD due to HTN s/p DDRT on 12/8/19 with Simulect induction. Donor was 50 y/o with anoxic injury, CIT 25 hrs, KDPI 81%.   Post transplant course complicated by DGF requiring dialysis and TMA likely tacrolimus induced. Tacrolimus d/ezra and he is started on belatacept. Kidney biopsy with extensive ATN and focal TMA.     HD done yesterday AM. Received 2nd dose belatacept yesterday. UOP 165ml/24 hours.      Labs reviewed. Platelets stable 215, Hgb slowly declining 8.1, Electrolytes fair.   Tx renal ultrasound with increased velocities c/w transplant renal artery stenosis     Delayed graft function due to ATN. Volume status and electrolytes are fair. UOP remains marginal. Continue HD per primary nephrology   Tx doppler ultrasound from yesterday suspicious for renal artery stenosis - images reviewed with Dr. Poe and Radiology, plan to repeat today   Tacrolimus induced TMA, resolved off tacro. Belatacept 2nd dose given yesterday. Next dose at day 14 as outpatient.   Continue Cellcept 1gm po bid.  Prednisone 20mg today, taper to 10 mg tomorrow and maintain on 10mg daily  , monitor closely for rejection since he is off CNI   Give procrit 20,000 units sq x1 for Anemia of CKD , will need to be maintained on LALA for anemia of ESRD until renal function recovery
Seen and examined on dialysis this AM.     50 y/o man with ESRD due to HTN s/p DDRT on 12/8/19 with Simulect induction. Donor was 50 y/o with anoxic injury, CIT 25 hrs, KDPI 81%.   Post transplant course complicated by DGF requiring dialysis and TMA likely tacrolimus induced. Tacrolimus d/ezra and he is started on belatacept. Kidney biopsy with extensive ATN and focal TMA.     No major events overnight. UOP 170ml/24 hours.    Lungs clear, abdomen soft and non tender, LE edema R>L unchanged     Labs reviewed. WBC 16.4, Hgb 7.6 , Platelets 258  Repeat transplant renal ultrasound no renal artery stenosis   LE duplex negative for DVT     Delayed graft function due to ATN. Volume status and electrolytes are fair. UOP remains marginal. Continue HD per primary nephrology   Tacrolimus induced TMA, resolved off tacro. Belatacept 2nd dose given 12/17.  Next dose at day 14 as outpatient.   Continue Cellcept 1gm po bid.  Prednisone 10mg daily . Monitor closely for rejection since he is off CNI   Continue procrit 20,000 units 3x/week for anemia of CKD   Leukocytosis probably due to steroids, afebrile. U/A, urine culture and blood culture for w/u

## 2019-12-20 NOTE — DISCHARGE NOTE NURSING/CASE MANAGEMENT/SOCIAL WORK - NSDCPEWEB_GEN_ALL_CORE
[facility] for Tobacco Control website --- [email or web address] website --- [email or web address]

## 2019-12-20 NOTE — PROGRESS NOTE ADULT - ASSESSMENT
50 yo M with hx of ESRD on HD, s/p DDRT on 12/8, elevated SCr on HD using AVF, s/p PLEX on 12/12 due to concern for TMA (drop in platelet counts 200s-->40s, schistocytes on PB, elevated , hapto <20, Tbili 1.3,) s/p plasmapheresis 12/14  - renal biopsy 12/13 report showed ATN w/ focal TMA, currently monitor for renal recovery requiring HD

## 2019-12-20 NOTE — DISCHARGE NOTE PROVIDER - CARE PROVIDER_API CALL
Chuy Lowery (MD)  Internal Medicine; Nephrology  [address] [place] [address]  Phone: [phone]  Fax: [phone]  Follow Up Time:     Shawn Espinal)  Surgery  Organ Transplant  [address] [place] [address]  Phone: [phone]  Fax: [phone]  Follow Up Time:

## 2019-12-20 NOTE — PROGRESS NOTE ADULT - REASON FOR ADMISSION
DDRT

## 2019-12-20 NOTE — DISCHARGE NOTE PROVIDER - NSDCACTIVITY_GEN_ALL_CORE
Showering allowed/Walking - Outdoors allowed/Walking - Indoors allowed/Stairs allowed/No heavy lifting/straining

## 2019-12-20 NOTE — DISCHARGE NOTE NURSING/CASE MANAGEMENT/SOCIAL WORK - NSDCPEEMAIL_GEN_ALL_CORE
[facility] for Tobacco Control email [email or web address]

## 2019-12-20 NOTE — PROGRESS NOTE ADULT - SUBJECTIVE AND OBJECTIVE BOX
Transplant Surgery - Multidisciplinary Rounds  --------------------------------------------------------------  DDRT     Date: 12/8/2019      POD #12    Present:   Patient seen and examined with multidisciplinary team including Transplant Surgeon: Dr. Be, Transplant Nephrologist: Dr. Tamayo.  Pharmacist: Job Diaz. ACPs: Charmaine/Dino, and unit RN during am rounds, and examined with Dr. Be   Disciplines not in attendance will be notified of the plan.     HPI: 49M with significant PMHx of ESRD on HD  (no bx, since 2013 with  TTS via LUE AVF, anuric), HTN, Gout, Glaucoma, NET of pancreas (s/p robotic distal panc/splenectomy at [place] 2015 by Dr. Young, followed by Dr. Raphael, [facility] Oncologist), RA with hand contractures.     Underwent DDRT on 12/8/19 (ureteral stent sutured to Martinez - removed 12/15). Post op course c/b DGF requiring HD, thrombocytopenia, elevated LDH and Hapto < 20, Schistocytes  on peripheral smear concerning for TMA. Envarsus held. Received Belatacept 12/13. Started on PLEX (12/12, 12/15). Underwent renal bx on 12/13 c/w profound ATN.        Recipient info:   CPRA:    o%  ABO:      A+  CMVAb:  Positive  EBVIgG Status:  Positive  Last HD:  12/7/2019    Donor ID:  [id number]  Match: [id number]  OPO: NYRT  Age:  49  ABO:  A  KDPI 81%  COD:  Anoxia  X Clamp Time:  12/7/2019 1538.  Medical Hx: DM, HTN, HLD, obesity BMI 53, CAD, CABAGX3  Terminal Cr:  1/1.8/1.7  CMV- Neg EBV- Neg    OR:    DDRT to right iliac fossa. Simulect induction. Two arteries anastomosed to a single cuff on the back table. One vein, one ureter. Ureteral anastomosis performed over a double J stent, which was sutured to the martinez.   Cold ischemia time 25.5 hours.    Interval Events:    - Afebrile, VS stable with no overnight events  - continued oliguria, BO 130cc (SS drainage)     Potential Discharge date: today with outpatient HD and BO    Education: Medications and plan of care    Plan of care: See below    MEDICATIONS  (STANDING):  allopurinol 100 milliGRAM(s) Oral daily  brimonidine 0.2% Ophthalmic Solution 1 Drop(s) Left EYE three times a day  dextrose 5%. 1000 milliLiter(s) (50 mL/Hr) IV Continuous <Continuous>  dextrose 50% Injectable 12.5 Gram(s) IV Push once  dextrose 50% Injectable 25 Gram(s) IV Push once  dextrose 50% Injectable 25 Gram(s) IV Push once  dorzolamide 2% Ophthalmic Solution 1 Drop(s) Left EYE three times a day  famotidine    Tablet 20 milliGRAM(s) Oral daily  gabapentin 100 milliGRAM(s) Oral two times a day  hydrALAZINE 50 milliGRAM(s) Oral two times a day  insulin lispro (HumaLOG) corrective regimen sliding scale   SubCutaneous three times a day before meals  latanoprost 0.005% Ophthalmic Solution 1 Drop(s) Left EYE at bedtime  metoprolol tartrate 25 milliGRAM(s) Oral two times a day  mycophenolate mofetil 1 Gram(s) Oral every 12 hours  nystatin    Suspension 835032 Unit(s) Swish and Swallow four times a day  predniSONE   Tablet   Oral   predniSONE   Tablet 10 milliGRAM(s) Oral daily  sodium chloride 0.9% lock flush 3 milliLiter(s) IV Push every 8 hours  trimethoprim   80 mG/sulfamethoxazole 400 mG 1 Tablet(s) Oral daily  valGANciclovir 450 milliGRAM(s) Oral <User Schedule>    MEDICATIONS  (PRN):  dextrose 40% Gel 15 Gram(s) Oral once PRN Blood Glucose LESS THAN 70 milliGRAM(s)/deciliter  glucagon  Injectable 1 milliGRAM(s) IntraMuscular once PRN Glucose LESS THAN 70 milligrams/deciliter  lidocaine 2% Jelly 10 milliLiter(s) IntraUrethral three times a day PRN Ureteral irritation  ondansetron Injectable 4 milliGRAM(s) IV Push every 6 hours PRN Nausea and/or Vomiting  traMADol 25 milliGRAM(s) Oral every 6 hours PRN Moderate Pain (4 - 6)  traMADol 50 milliGRAM(s) Oral every 6 hours PRN Severe Pain (7 - 10)    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  Carpal tunnel syndrome  Brain cyst: had MRI recently- now gone  Anemia  Gastric ulcer: Long time ago  Rheumatoid arthritis  Breakdown (mechanical) of penile (implanted) prosthesis, sequela  End-stage renal disease (ESRD): PERMACATH PLACEMENT  Abscess of left buttock: s/p I &amp; D  AV fistula: placement left lower arm  S/P cystoscopy: stent placement &amp; removal for kidney stones, lithiotripsy  s/p Lt Carpal tunnel: 2011      Vital Signs Last 24 Hrs  T(C): 36.8 (20 Dec 2019 09:00), Max: 37.2 (19 Dec 2019 21:00)  T(F): 98.2 (20 Dec 2019 09:00), Max: 98.9 (19 Dec 2019 21:00)  HR: 76 (20 Dec 2019 09:00) (63 - 76)  BP: 129/77 (20 Dec 2019 09:00) (119/65 - 169/90)  BP(mean): 87 (20 Dec 2019 07:55) (86 - 87)  RR: 18 (20 Dec 2019 09:00) (18 - 18)  SpO2: 97% (20 Dec 2019 09:00) (97% - 100%)    I&O's Summary    19 Dec 2019 07:01  -  20 Dec 2019 07:00  --------------------------------------------------------  IN: 1055 mL / OUT: 1340 mL / NET: -285 mL    20 Dec 2019 07:01  -  20 Dec 2019 10:55  --------------------------------------------------------  IN: 240 mL / OUT: 70 mL / NET: 170 mL                          7.3    15.36 )-----------( 244      ( 20 Dec 2019 06:36 )             23.1     12-20    139  |  95<L>  |  47<H>  ----------------------------<  98  4.1   |  29  |  4.93<H>    Ca    9.2      20 Dec 2019 06:36  Phos  3.7     12-20  Mg     1.8     12-20    TPro  6.2  /  Alb  3.6  /  TBili  0.6  /  DBili  x   /  AST  42<H>  /  ALT  30  /  AlkPhos  91  12-20      Culture - Stool (collected 12-14-19 @ 15:33)  Source: .Stool Feces  Final Report (12-16-19 @ 18:41):    No enteric pathogens isolated.    (Stool culture examined for Salmonella,    Shigella, Campylobacter, Aeromonas, Plesiomonas,    Vibrio, E.coli O157 and Yersinia)    GI PCR Panel, Stool (collected 12-14-19 @ 15:05)  Source: .Stool moriah johnie  Final Report (12-14-19 @ 18:05):    GI PCR Results: NOT detected    *******Please Note:*******    GI panel PCR evaluates for:    Campylobacter, Plesiomonas shigelloides, Salmonella,    Vibrio, Yersinia enterocolitica, Enteroaggregative    Escherichia coli (EAEC), Enteropathogenic E.coli (EPEC),    Enterotoxigenic E. coli (ETEC) lt/st, Shiga-like    toxin-producing E. coli (STEC) stx1/stx2,    Shigella/ Enteroinvasive E. coli (EIEC), Cryptosporidium,    Cyclospora cayetanensis, Entamoeba histolytica,    Giardia lamblia, Adenovirus F 40/41, Astrovirus,    Norovirus GI/GII, Rotavirus A, Sapovirus      REVIEW OF SYSTEMS  --------------------------------------------------------------------------------  Gen: No weight changes, fatigue, fevers/chills, + weakness  Skin: No rashes  Head/Eyes/Ears/Mouth: No headache; Normal hearing; Normal vision w/o blurriness; No sinus pain/discomfort, sore throat  Respiratory: No dyspnea, cough, wheezing, hemoptysis  CV: No chest pain, PND, orthopnea  GI: No abdominal pain, constipation, nausea, vomiting, melena, hematochezia. no diarrhea  :  No increased frequency, dysuria, hematuria, nocturia  MSK: No joint pain/swelling; no back pain; no edema  Neuro: No dizziness/lightheadedness, weakness, seizures, numbness, tingling  Heme: No easy bruising or bleeding  Endo: No heat/cold intolerance  Psych: No significant nervousness, anxiety, stress, depression  Ext:  c/o RLE edema  All other systems were reviewed and are negative, except as noted.      PHYSICAL EXAM:  Constitutional: Well developed / well nourished  Eyes: Anicteric, PERRLA  ENMT: nc/at, no thrush  Neck: Supple  Respiratory: CTA B/L  Cardiovascular: RRR  Gastrointestinal: Soft abdomen, ND. mild incisional TTP. Incision c/d/i. JPx1 ss.    Genitourinary:  voiding spontaneously, oliguria  Extremities: RLE edema improving, LLE stable. SCD's in place and working bilaterally  Vascular: Palpable dp pulses bilaterally  Neurological: A&O x3  Skin: no new lesions, ulcerations  Musculoskeletal: Moving all extremities  Psychiatric: Responsive

## 2019-12-20 NOTE — DISCHARGE NOTE PROVIDER - HOSPITAL COURSE
49M with significant PMHx of ESRD on HD  (no bx, since 2013 with  TTS via LUE AVF, anuric), HTN, Gout, Glaucoma, NET of pancreas (s/p robotic distal panc/splenectomy at [place] 2015 by Dr. Young, followed by Dr. Raphael, [facility] Oncologist), RA with hand contractures.         Underwent DDRT on 12/8/19 (ureteral stent sutured to Martinez - removed 12/15). Post op course c/b DGF requiring HD, thrombocytopenia, elevated LDH and Haptoglobulin. Schistocytes  on peripheral smear concerning for TMA. Envarsus held. Received Belatacept 12/13. Started on PLEX (12/12, 12/15). Underwent renal bx on 12/13 c/w profound ATN.  Found to have much improvement to levels, likely related to Tacrolimus.          He otherwise did well from a surgical perspective. Tolerated HD and PLEX.  Tolerated diet well. Had normal bowel function.  Ambulated without assistance.  Good pain control.  Continued oliguria with d/c Cr 8, on HD.  Was evaluated daily by our multidisciplinary transplant team including surgeons, nephrologists, ACPs, pharmacists, nutrition, SW, RNs, coordinators.          Will d/c home with the following plan:    -outpatient Belatacept, now off Envarsus    -continue MMF 1/1, Pred 10QD    -Bactrim/Valcyte/Nystatin/Pepcid    -TTS HD via L AVF    -f/u with Dr. Lowery on Tuesday            Recipient info:     CPRA:    0%    ABO:      A+    CMVAb:  Positive    EBVIgG Status:  Positive    Last HD:  12/7/2019        Donor ID:  [id number]    Match: [id number]    OPO: NYRT    Age:  49    ABO:  A    KDPI 81%    COD:  Anoxia    X Clamp Time:  12/7/2019 1538.    Medical Hx: DM, HTN, HLD, obesity BMI 53, CAD, CABAGX3    Terminal Cr:  1/1.8/1.7    CMV- Neg EBV- Neg        OR:      DDRT to right iliac fossa. Simulect induction. Two arteries anastomosed to a single cuff on the back table. One vein, one ureter. Ureteral anastomosis performed over a double J stent, which was sutured to the martinez.     Cold ischemia time 25.5 hours.

## 2019-12-20 NOTE — DISCHARGE NOTE PROVIDER - NSDCMRMEDTOKEN_GEN_ALL_CORE_FT
allopurinol 100 mg oral tablet: 1 tab(s) orally once a day  brimonidine 0.2% ophthalmic solution: 1 drop(s) to each affected eye 3 times a day  dorzolamide 2% ophthalmic solution: 1 drop(s) to each affected eye 3 times a day  epoetin trey: to be administered with HD three times a week  famotidine 20 mg oral tablet: 1 tab(s) orally once a day  gabapentin 100 mg oral capsule: 1 cap(s) orally 2 times a day  hydrALAZINE 50 mg oral tablet: 1 tab(s) orally 2 times a day  latanoprost ophthalmic 0.005% ophthalmic solution: 1 drop(s) to each affected eye once a day (at bedtime)  metoprolol 25 mg oral tablet: 1 tab(s) orally 2 times a day  mycophenolate mofetil 500 mg oral tablet: 2 tab(s) orally 2 times a day  nystatin 100,000 units/mL oral suspension: 5 milliliter(s) orally 4 times a day  predniSONE: please follow your steroid taper sheet  sulfamethoxazole-trimethoprim 400 mg-80 mg oral tablet: 1 tab(s) orally once a day  valGANciclovir 450 mg oral tablet: 1 tab(s) orally

## 2019-12-20 NOTE — CHART NOTE - NSCHARTNOTEFT_GEN_A_CORE
Nutrition follow up     Pt seen for post kidney transplant recipient nutrition follow up per department protocol.     Hospital course as per chart: Pt 50 y/o M with PMH: ESRD on HD; S/P DDRT on (12/08/19) complicated by DGF requiring HD; renal biopsy report showed ATN with focal TMA; over weekend HD (12/14), received PLEX plasmapheresis on (12/15), Kennedy/stent removed, plans for home with outpatient dialysis.    Source: Patient [x]    Family [ ]     other [x]; Medical record    Pt reports good appetite and PO intake, states consuming 100% of meals. Denies difficulty chewing/swallowing. Pt denies nausea, vomiting, diarrhea, or constipation, last BM today (12/20). Urine output as per flow sheets (12/18) 170 ml -> (12/19) 130 ml -> (12/20) 0 ml. Reviewed renal diet for HD and education on post transplant nutrition therapy before discharge - pt denies having further questions/concerns about diet and nutrition education provided at this time.     Diet: renal     Enteral /Parenteral Nutrition: n/a    Current Weight: (12/08) 141.3 pounds -> (12/12) 146.1 pounds -> (12/16) 148.1 pounds -> (12/20) 154.1 pounds- ?accuracy of weight fluctuations as pt S/P DDRT (12/08) with DGF on HD and with edema, will continue to monitor.   % Weight Change: n/a    Pertinent Medications: MEDICATIONS  (STANDING):  allopurinol 100 milliGRAM(s) Oral daily  brimonidine 0.2% Ophthalmic Solution 1 Drop(s) Left EYE three times a day  dextrose 5%. 1000 milliLiter(s) (50 mL/Hr) IV Continuous <Continuous>  dextrose 50% Injectable 12.5 Gram(s) IV Push once  dextrose 50% Injectable 25 Gram(s) IV Push once  dextrose 50% Injectable 25 Gram(s) IV Push once  dorzolamide 2% Ophthalmic Solution 1 Drop(s) Left EYE three times a day  famotidine    Tablet 20 milliGRAM(s) Oral daily  gabapentin 100 milliGRAM(s) Oral two times a day  hydrALAZINE 50 milliGRAM(s) Oral two times a day  insulin lispro (HumaLOG) corrective regimen sliding scale   SubCutaneous three times a day before meals  latanoprost 0.005% Ophthalmic Solution 1 Drop(s) Left EYE at bedtime  metoprolol tartrate 25 milliGRAM(s) Oral two times a day  mycophenolate mofetil 1 Gram(s) Oral every 12 hours  nystatin    Suspension [id number] Unit(s) Swish and Swallow four times a day  predniSONE   Tablet   Oral   predniSONE   Tablet 10 milliGRAM(s) Oral daily  sodium chloride 0.9% lock flush 3 milliLiter(s) IV Push every 8 hours  trimethoprim   80 mG/sulfamethoxazole 400 mG 1 Tablet(s) Oral daily  valGANciclovir 450 milliGRAM(s) Oral <User Schedule>    MEDICATIONS  (PRN):  dextrose 40% Gel 15 Gram(s) Oral once PRN Blood Glucose LESS THAN 70 milliGRAM(s)/deciliter  glucagon  Injectable 1 milliGRAM(s) IntraMuscular once PRN Glucose LESS THAN 70 milligrams/deciliter  lidocaine 2% Jelly 10 milliLiter(s) IntraUrethral three times a day PRN Ureteral irritation  ondansetron Injectable 4 milliGRAM(s) IV Push every 6 hours PRN Nausea and/or Vomiting  traMADol 25 milliGRAM(s) Oral every 6 hours PRN Moderate Pain (4 - 6)  traMADol 50 milliGRAM(s) Oral every 6 hours PRN Severe Pain (7 - 10)    Pertinent Labs: (12/20) Cr  4.93 mg/dL<H> BUN 47 mg/dL<H>   Finger sticks: (12/20) 96 - 98 (12/19) 112 - 140   (12/11) [id number] 5.0 %    Skin: no noted pressure injuries as per documentation.   Noted +2 right leg and jolynn. ankle edema as per flow sheets (previously with +1 generalized, right leg, and jolynn. ankle edema).     Estimated Needs:   [x] no change since previous assessment  [ ] recalculated:     Previous Nutrition Diagnoses:   [x] Increased Nutrient Needs   [x] Food & Nutrition Related Knowledge Deficit     Nutrition Diagnoses are [x] ongoing - being addressed with PO intake encouragement and nutrition therapy education.     New Nutrition Diagnosis: [x] not applicable    Interventions:     1. Recommend continue renal diet upon discharge. Recommend follow up visit with Transplant MD and outpatient RD for dietary modifications as warranted.   2. Reviewed education on renal diet on HD and post transplant nutrition therapy before discharge. Discussed importance of thoroughly washing all fresh fruits/vegetables, importance of avoiding uncooked/raw/unpasteurized foods, avoiding pre-made deli/buffet/salad bar meals. Reviewed recommendations for moderate intake of sodium and carbohydrates with transplant medications. Reviewed effect of steroids on BG levels and importance of limiting concentrated sweets. Provided education on increased demand for kcal and protein, reviewed foods high in potassium, phosphorus, and sodium, discussed foods recommended within therapeutic diet and protein portion sizing. All questions answered.   3. Continue to obtain weights to identify changes if any.     Monitoring and Evaluation:     [x] PO intake [x] Tolerance to diet prescription [x] weights [x] follow up per protocol    [x] other: urine output; needs for further education     RD remains available.  Mari Morse MS RDN CDN #[phone].

## 2019-12-20 NOTE — PROGRESS NOTE ADULT - ASSESSMENT
49M with significant PMHx of ESRD on HD  (no bx, since 2013 with  TTS via LUE AVF, anuric), HTN, Gout, Glaucoma, NET of pancreas (s/p robotic distal panc/splenectomy at [place] 2015 by Dr. Young, followed by Dr. Raphael, [facility] Oncologist), RA with hand contractures  s/p DDRT on 12/8/19 complicated by DGF requiring HD.    s/p DDRT POD#12  - Delayed graft function requiring HD. Oliguric.   - Renal bx from 12/15 with profound ATN  - strict I&Os (JPx1)  - On PRN Tylenol and tramadol for pain  - Immuno: MMF 1/1, Pred 10mg daily, Belatacept given on 12/13 and 12/17; Next dose on 12/26 (will be arranged outpatient)   - PPX: Nystatin, bactrim, Valcyte renally dosed BIW      HTN  -well controlled  -Continue Metoprolol and hydralazine      Dispo  - dc home today with HD TTS

## 2019-12-20 NOTE — PROGRESS NOTE ADULT - SUBJECTIVE AND OBJECTIVE BOX
S/p stable HD yesterday, denies complaints    Vital Signs Last 24 Hrs  T(C): 36.7 (19 @ 13:00), Max: 37.2 (19 @ 21:00)  T(F): 98.1 (19 @ 13:00), Max: 98.9 (19 @ 21:00)  HR: 74 (19 @ 13:00) (64 - 76)  BP: 133/81 (19 @ 13:00) (119/65 - 133/81)  BP(mean): 87 (19 @ 07:55) (87 - 87)  RR: 18 (19 @ 13:00) (18 - 18)  SpO2: 97% (19 @ 13:00) (97% - 100%)    Card S1S2  Lungs b/l air entry  Abd soft, ND  Extr UE AVF patent, + LE edema                                                                7.3    15.36 )-----------( 244      ( 20 Dec 2019 06:36 )             23.1     20 Dec 2019 06:36    139    |  95     |  47     ----------------------------<  98     4.1     |  29     |  4.93     Ca    9.2        20 Dec 2019 06:36  Phos  3.7       20 Dec 2019 06:36  Mg     1.8       20 Dec 2019 06:36    TPro  6.2    /  Alb  3.6    /  TBili  0.6    /  DBili  x      /  AST  42     /  ALT  30     /  AlkPhos  91     20 Dec 2019 06:36    LIVER FUNCTIONS - ( 20 Dec 2019 06:36 )  Alb: 3.6 g/dL / Pro: 6.2 g/dL / ALK PHOS: 91 U/L / ALT: 30 U/L / AST: 42 U/L / GGT: x           Urinalysis Basic - ( 20 Dec 2019 02:10 )    Color: Orange / Appearance: Turbid / S.027 / pH: x  Gluc: x / Ketone: Trace  / Bili: Small / Urobili: 2 mg/dL   Blood: x / Protein: 100 mg/dL / Nitrite: Negative   Leuk Esterase: Large / RBC: 1370 /hpf /  /HPF   Sq Epi: x / Non Sq Epi: 2 / Bacteria: Negative    allopurinol 100 milliGRAM(s) Oral daily  brimonidine 0.2% Ophthalmic Solution 1 Drop(s) Left EYE three times a day  dextrose 40% Gel 15 Gram(s) Oral once PRN  dextrose 5%. 1000 milliLiter(s) IV Continuous <Continuous>  dextrose 50% Injectable 12.5 Gram(s) IV Push once  dextrose 50% Injectable 25 Gram(s) IV Push once  dextrose 50% Injectable 25 Gram(s) IV Push once  dorzolamide 2% Ophthalmic Solution 1 Drop(s) Left EYE three times a day  famotidine    Tablet 20 milliGRAM(s) Oral daily  gabapentin 100 milliGRAM(s) Oral two times a day  glucagon  Injectable 1 milliGRAM(s) IntraMuscular once PRN  hydrALAZINE 50 milliGRAM(s) Oral two times a day  insulin lispro (HumaLOG) corrective regimen sliding scale   SubCutaneous three times a day before meals  latanoprost 0.005% Ophthalmic Solution 1 Drop(s) Left EYE at bedtime  lidocaine 2% Jelly 10 milliLiter(s) IntraUrethral three times a day PRN  metoprolol tartrate 25 milliGRAM(s) Oral two times a day  mycophenolate mofetil 1 Gram(s) Oral every 12 hours  nystatin    Suspension [id number] Unit(s) Swish and Swallow four times a day  ondansetron Injectable 4 milliGRAM(s) IV Push every 6 hours PRN  predniSONE   Tablet   Oral   predniSONE   Tablet 10 milliGRAM(s) Oral daily  sodium chloride 0.9% lock flush 3 milliLiter(s) IV Push every 8 hours  traMADol 25 milliGRAM(s) Oral every 6 hours PRN  traMADol 50 milliGRAM(s) Oral every 6 hours PRN  trimethoprim   80 mG/sulfamethoxazole 400 mG 1 Tablet(s) Oral daily  valGANciclovir 450 milliGRAM(s) Oral <User Schedule>    A/P:    Hx ESRD on HD  S/p DDRT on 2019  DGF  S/p transplant kidney bx : ATN, focal TMA  S/p PLEX  and 12/15  Eval by Heme/Onc  Immunosuppresion/transplant management per transplant team  Plan for HD 3 x week w/close f/u of UO, Cr  Heparin free  Next HD Sat

## 2019-12-23 ENCOUNTER — INBOUND DOCUMENT (OUTPATIENT)
Age: 49
End: 2019-12-23

## 2019-12-26 ENCOUNTER — APPOINTMENT (OUTPATIENT)
Dept: NEPHROLOGY | Facility: CLINIC | Age: 49
End: 2019-12-26

## 2019-12-27 ENCOUNTER — APPOINTMENT (OUTPATIENT)
Dept: RHEUMATOLOGY | Facility: CLINIC | Age: 49
End: 2019-12-27

## 2019-12-27 ENCOUNTER — CHART COPY (OUTPATIENT)
Age: 49
End: 2019-12-27

## 2019-12-27 ENCOUNTER — INPATIENT (INPATIENT)
Facility: HOSPITAL | Age: 49
LOS: 48 days | Discharge: NURSING, REHAB OR LONG-TERM CARE | DRG: 653 | End: 2020-02-14
Attending: TRANSPLANT SURGERY | Admitting: TRANSPLANT SURGERY
Payer: COMMERCIAL

## 2019-12-27 VITALS
DIASTOLIC BLOOD PRESSURE: 73 MMHG | WEIGHT: 139.99 LBS | SYSTOLIC BLOOD PRESSURE: 128 MMHG | TEMPERATURE: 102 F | RESPIRATION RATE: 16 BRPM | OXYGEN SATURATION: 96 % | HEART RATE: 93 BPM | HEIGHT: 71 IN

## 2019-12-27 DIAGNOSIS — Z98.89 OTHER SPECIFIED POSTPROCEDURAL STATES: Chronic | ICD-10-CM

## 2019-12-27 DIAGNOSIS — L02.31 CUTANEOUS ABSCESS OF BUTTOCK: Chronic | ICD-10-CM

## 2019-12-27 DIAGNOSIS — I77.0 ARTERIOVENOUS FISTULA, ACQUIRED: Chronic | ICD-10-CM

## 2019-12-27 DIAGNOSIS — T83.410S BREAKDOWN (MECHANICAL) OF IMPLANTED PENILE PROSTHESIS, SEQUELA: Chronic | ICD-10-CM

## 2019-12-27 DIAGNOSIS — N18.6 END STAGE RENAL DISEASE: Chronic | ICD-10-CM

## 2019-12-27 DIAGNOSIS — R50.9 FEVER, UNSPECIFIED: ICD-10-CM

## 2019-12-27 DIAGNOSIS — Z94.0 KIDNEY TRANSPLANT STATUS: Chronic | ICD-10-CM

## 2019-12-27 DIAGNOSIS — M1A.3790 CHRONIC GOUT DUE TO RENAL IMPAIRMENT, UNSPECIFIED ANKLE AND FOOT, WITHOUT TOPHUS (TOPHI): ICD-10-CM

## 2019-12-27 DIAGNOSIS — D89.9 DISORDER INVOLVING THE IMMUNE MECHANISM, UNSPECIFIED: ICD-10-CM

## 2019-12-27 DIAGNOSIS — I10 ESSENTIAL (PRIMARY) HYPERTENSION: ICD-10-CM

## 2019-12-27 DIAGNOSIS — Z94.0 KIDNEY TRANSPLANT STATUS: ICD-10-CM

## 2019-12-27 LAB
ALBUMIN SERPL ELPH-MCNC: 3.3 G/DL — SIGNIFICANT CHANGE UP (ref 3.3–5)
ALP SERPL-CCNC: 275 U/L — HIGH (ref 40–120)
ALT FLD-CCNC: 16 U/L — SIGNIFICANT CHANGE UP (ref 10–45)
ANION GAP SERPL CALC-SCNC: 18 MMOL/L — HIGH (ref 5–17)
APTT BLD: 30.8 SEC — SIGNIFICANT CHANGE UP (ref 27.5–36.3)
AST SERPL-CCNC: 34 U/L — SIGNIFICANT CHANGE UP (ref 10–40)
BASE EXCESS BLDV CALC-SCNC: 5.4 MMOL/L — HIGH (ref -2–2)
BASOPHILS # BLD AUTO: 0.04 K/UL — SIGNIFICANT CHANGE UP (ref 0–0.2)
BASOPHILS NFR BLD AUTO: 0.3 % — SIGNIFICANT CHANGE UP (ref 0–2)
BILIRUB SERPL-MCNC: 1.4 MG/DL — HIGH (ref 0.2–1.2)
BUN SERPL-MCNC: 54 MG/DL — HIGH (ref 7–23)
BUN SERPL-MCNC: 55 MG/DL — HIGH (ref 7–23)
CA-I SERPL-SCNC: 1.08 MMOL/L — LOW (ref 1.12–1.3)
CALCIUM SERPL-MCNC: 8.3 MG/DL — LOW (ref 8.4–10.5)
CALCIUM SERPL-MCNC: 8.7 MG/DL — SIGNIFICANT CHANGE UP (ref 8.4–10.5)
CHLORIDE BLDV-SCNC: 91 MMOL/L — LOW (ref 96–108)
CHLORIDE SERPL-SCNC: 88 MMOL/L — LOW (ref 96–108)
CHLORIDE SERPL-SCNC: 88 MMOL/L — LOW (ref 96–108)
CO2 BLDV-SCNC: 32 MMOL/L — HIGH (ref 22–30)
CO2 SERPL-SCNC: 24 MMOL/L — SIGNIFICANT CHANGE UP (ref 22–31)
CO2 SERPL-SCNC: 27 MMOL/L — SIGNIFICANT CHANGE UP (ref 22–31)
CREAT SERPL-MCNC: 7.64 MG/DL — HIGH (ref 0.5–1.3)
CREAT SERPL-MCNC: 7.79 MG/DL — HIGH (ref 0.5–1.3)
EOSINOPHIL # BLD AUTO: 0.2 K/UL — SIGNIFICANT CHANGE UP (ref 0–0.5)
EOSINOPHIL NFR BLD AUTO: 1.3 % — SIGNIFICANT CHANGE UP (ref 0–6)
FLUAV H1 RNA SPEC QL NAA+PROBE: DETECTED
FLUAV SUBTYP SPEC NAA+PROBE: ABNORMAL
GAS PNL BLDV: 136 MMOL/L — SIGNIFICANT CHANGE UP (ref 135–145)
GAS PNL BLDV: SIGNIFICANT CHANGE UP
GAS PNL BLDV: SIGNIFICANT CHANGE UP
GLUCOSE BLDV-MCNC: 88 MG/DL — SIGNIFICANT CHANGE UP (ref 70–99)
GLUCOSE SERPL-MCNC: 61 MG/DL — LOW (ref 70–99)
GLUCOSE SERPL-MCNC: 87 MG/DL — SIGNIFICANT CHANGE UP (ref 70–99)
HCO3 BLDV-SCNC: 30 MMOL/L — HIGH (ref 21–29)
HCT VFR BLD CALC: 21.7 % — LOW (ref 39–50)
HCT VFR BLDA CALC: 24 % — LOW (ref 39–50)
HGB BLD CALC-MCNC: 7.6 G/DL — LOW (ref 13–17)
HGB BLD-MCNC: 7.3 G/DL — LOW (ref 13–17)
IMM GRANULOCYTES NFR BLD AUTO: 1.7 % — HIGH (ref 0–1.5)
INR BLD: 1.27 RATIO — HIGH (ref 0.88–1.16)
LACTATE BLDV-MCNC: 1.9 MMOL/L — SIGNIFICANT CHANGE UP (ref 0.7–2)
LYMPHOCYTES # BLD AUTO: 1.27 K/UL — SIGNIFICANT CHANGE UP (ref 1–3.3)
LYMPHOCYTES # BLD AUTO: 8.5 % — LOW (ref 13–44)
MCHC RBC-ENTMCNC: 32.6 PG — SIGNIFICANT CHANGE UP (ref 27–34)
MCHC RBC-ENTMCNC: 33.6 GM/DL — SIGNIFICANT CHANGE UP (ref 32–36)
MCV RBC AUTO: 96.9 FL — SIGNIFICANT CHANGE UP (ref 80–100)
MONOCYTES # BLD AUTO: 0.93 K/UL — HIGH (ref 0–0.9)
MONOCYTES NFR BLD AUTO: 6.3 % — SIGNIFICANT CHANGE UP (ref 2–14)
NEUTROPHILS # BLD AUTO: 12.17 K/UL — HIGH (ref 1.8–7.4)
NEUTROPHILS NFR BLD AUTO: 81.9 % — HIGH (ref 43–77)
NRBC # BLD: 0 /100 WBCS — SIGNIFICANT CHANGE UP (ref 0–0)
PCO2 BLDV: 48 MMHG — SIGNIFICANT CHANGE UP (ref 35–50)
PH BLDV: 7.41 — SIGNIFICANT CHANGE UP (ref 7.35–7.45)
PLATELET # BLD AUTO: 430 K/UL — HIGH (ref 150–400)
PO2 BLDV: 27 MMHG — SIGNIFICANT CHANGE UP (ref 25–45)
POTASSIUM BLDV-SCNC: 4 MMOL/L — SIGNIFICANT CHANGE UP (ref 3.5–5.3)
POTASSIUM SERPL-MCNC: 4.2 MMOL/L — SIGNIFICANT CHANGE UP (ref 3.5–5.3)
POTASSIUM SERPL-MCNC: 4.3 MMOL/L — SIGNIFICANT CHANGE UP (ref 3.5–5.3)
POTASSIUM SERPL-SCNC: 4.2 MMOL/L — SIGNIFICANT CHANGE UP (ref 3.5–5.3)
POTASSIUM SERPL-SCNC: 4.3 MMOL/L — SIGNIFICANT CHANGE UP (ref 3.5–5.3)
PROT SERPL-MCNC: 7.1 G/DL — SIGNIFICANT CHANGE UP (ref 6–8.3)
PROTHROM AB SERPL-ACNC: 14.6 SEC — HIGH (ref 10–12.9)
RAPID RVP RESULT: DETECTED
RBC # BLD: 2.24 M/UL — LOW (ref 4.2–5.8)
RBC # FLD: 18.2 % — HIGH (ref 10.3–14.5)
SAO2 % BLDV: 40 % — LOW (ref 67–88)
SODIUM SERPL-SCNC: 133 MMOL/L — LOW (ref 135–145)
SODIUM SERPL-SCNC: 136 MMOL/L — SIGNIFICANT CHANGE UP (ref 135–145)
WBC # BLD: 14.87 K/UL — HIGH (ref 3.8–10.5)
WBC # FLD AUTO: 14.87 K/UL — HIGH (ref 3.8–10.5)

## 2019-12-27 PROCEDURE — 93010 ELECTROCARDIOGRAM REPORT: CPT

## 2019-12-27 PROCEDURE — 71045 X-RAY EXAM CHEST 1 VIEW: CPT | Mod: 26

## 2019-12-27 PROCEDURE — 76776 US EXAM K TRANSPL W/DOPPLER: CPT | Mod: 26,RT

## 2019-12-27 PROCEDURE — 99285 EMERGENCY DEPT VISIT HI MDM: CPT

## 2019-12-27 PROCEDURE — 99222 1ST HOSP IP/OBS MODERATE 55: CPT | Mod: GC,24

## 2019-12-27 PROCEDURE — 99223 1ST HOSP IP/OBS HIGH 75: CPT | Mod: GC

## 2019-12-27 PROCEDURE — 99223 1ST HOSP IP/OBS HIGH 75: CPT

## 2019-12-27 RX ORDER — FAMOTIDINE 10 MG/ML
20 INJECTION INTRAVENOUS DAILY
Refills: 0 | Status: DISCONTINUED | OUTPATIENT
Start: 2019-12-27 | End: 2020-01-01

## 2019-12-27 RX ORDER — ALLOPURINOL 300 MG
100 TABLET ORAL DAILY
Refills: 0 | Status: DISCONTINUED | OUTPATIENT
Start: 2019-12-27 | End: 2020-01-01

## 2019-12-27 RX ORDER — VANCOMYCIN HCL 1 G
1000 VIAL (EA) INTRAVENOUS ONCE
Refills: 0 | Status: DISCONTINUED | OUTPATIENT
Start: 2019-12-27 | End: 2019-12-27

## 2019-12-27 RX ORDER — NYSTATIN 500MM UNIT
500000 POWDER (EA) MISCELLANEOUS
Refills: 0 | Status: DISCONTINUED | OUTPATIENT
Start: 2019-12-27 | End: 2020-01-01

## 2019-12-27 RX ORDER — METOPROLOL TARTRATE 50 MG
25 TABLET ORAL
Refills: 0 | Status: DISCONTINUED | OUTPATIENT
Start: 2019-12-27 | End: 2019-12-27

## 2019-12-27 RX ORDER — GABAPENTIN 400 MG/1
100 CAPSULE ORAL
Refills: 0 | Status: DISCONTINUED | OUTPATIENT
Start: 2019-12-27 | End: 2020-01-01

## 2019-12-27 RX ORDER — ACETAMINOPHEN 500 MG
650 TABLET ORAL ONCE
Refills: 0 | Status: COMPLETED | OUTPATIENT
Start: 2019-12-27 | End: 2019-12-27

## 2019-12-27 RX ORDER — PIPERACILLIN AND TAZOBACTAM 4; .5 G/20ML; G/20ML
3.38 INJECTION, POWDER, LYOPHILIZED, FOR SOLUTION INTRAVENOUS ONCE
Refills: 0 | Status: COMPLETED | OUTPATIENT
Start: 2019-12-27 | End: 2019-12-27

## 2019-12-27 RX ORDER — SENNA PLUS 8.6 MG/1
2 TABLET ORAL AT BEDTIME
Refills: 0 | Status: DISCONTINUED | OUTPATIENT
Start: 2019-12-27 | End: 2020-01-01

## 2019-12-27 RX ORDER — MYCOPHENOLATE MOFETIL 250 MG/1
500 CAPSULE ORAL
Refills: 0 | Status: DISCONTINUED | OUTPATIENT
Start: 2019-12-27 | End: 2019-12-29

## 2019-12-27 RX ORDER — HYDRALAZINE HCL 50 MG
50 TABLET ORAL
Refills: 0 | Status: DISCONTINUED | OUTPATIENT
Start: 2019-12-27 | End: 2019-12-27

## 2019-12-27 RX ORDER — BRIMONIDINE TARTRATE 2 MG/MG
1 SOLUTION/ DROPS OPHTHALMIC THREE TIMES A DAY
Refills: 0 | Status: DISCONTINUED | OUTPATIENT
Start: 2019-12-27 | End: 2020-01-01

## 2019-12-27 RX ORDER — BELATACEPT 250 MG/1
650 INJECTION, POWDER, LYOPHILIZED, FOR SOLUTION INTRAVENOUS ONCE
Refills: 0 | Status: COMPLETED | OUTPATIENT
Start: 2019-12-28 | End: 2019-12-28

## 2019-12-27 RX ORDER — LATANOPROST 0.05 MG/ML
1 SOLUTION/ DROPS OPHTHALMIC; TOPICAL DAILY
Refills: 0 | Status: DISCONTINUED | OUTPATIENT
Start: 2019-12-27 | End: 2020-01-01

## 2019-12-27 RX ORDER — METOPROLOL TARTRATE 50 MG
25 TABLET ORAL EVERY 12 HOURS
Refills: 0 | Status: DISCONTINUED | OUTPATIENT
Start: 2019-12-27 | End: 2019-12-28

## 2019-12-27 RX ORDER — HYDRALAZINE HCL 50 MG
25 TABLET ORAL EVERY 12 HOURS
Refills: 0 | Status: DISCONTINUED | OUTPATIENT
Start: 2019-12-27 | End: 2019-12-28

## 2019-12-27 RX ORDER — VALGANCICLOVIR 450 MG/1
450 TABLET, FILM COATED ORAL
Refills: 0 | Status: DISCONTINUED | OUTPATIENT
Start: 2019-12-27 | End: 2020-01-01

## 2019-12-27 RX ORDER — ACETAMINOPHEN 500 MG
1000 TABLET ORAL ONCE
Refills: 0 | Status: COMPLETED | OUTPATIENT
Start: 2019-12-27 | End: 2019-12-27

## 2019-12-27 RX ADMIN — FAMOTIDINE 20 MILLIGRAM(S): 10 INJECTION INTRAVENOUS at 18:59

## 2019-12-27 RX ADMIN — Medication 25 MILLIGRAM(S): at 18:58

## 2019-12-27 RX ADMIN — Medication 500000 UNIT(S): at 18:59

## 2019-12-27 RX ADMIN — Medication 400 MILLIGRAM(S): at 22:17

## 2019-12-27 RX ADMIN — BRIMONIDINE TARTRATE 1 DROP(S): 2 SOLUTION/ DROPS OPHTHALMIC at 22:18

## 2019-12-27 RX ADMIN — Medication 100 MILLIGRAM(S): at 18:59

## 2019-12-27 RX ADMIN — PIPERACILLIN AND TAZOBACTAM 200 GRAM(S): 4; .5 INJECTION, POWDER, LYOPHILIZED, FOR SOLUTION INTRAVENOUS at 10:46

## 2019-12-27 RX ADMIN — Medication 1000 MILLIGRAM(S): at 22:47

## 2019-12-27 RX ADMIN — Medication 30 MILLIGRAM(S): at 18:59

## 2019-12-27 RX ADMIN — Medication 10 MILLIGRAM(S): at 18:58

## 2019-12-27 RX ADMIN — LATANOPROST 1 DROP(S): 0.05 SOLUTION/ DROPS OPHTHALMIC; TOPICAL at 18:59

## 2019-12-27 RX ADMIN — Medication 650 MILLIGRAM(S): at 08:35

## 2019-12-27 RX ADMIN — GABAPENTIN 100 MILLIGRAM(S): 400 CAPSULE ORAL at 18:58

## 2019-12-27 RX ADMIN — VALGANCICLOVIR 450 MILLIGRAM(S): 450 TABLET, FILM COATED ORAL at 18:58

## 2019-12-27 RX ADMIN — MYCOPHENOLATE MOFETIL 500 MILLIGRAM(S): 250 CAPSULE ORAL at 18:59

## 2019-12-27 RX ADMIN — BRIMONIDINE TARTRATE 1 DROP(S): 2 SOLUTION/ DROPS OPHTHALMIC at 19:00

## 2019-12-27 NOTE — H&P ADULT - NSICDXPASTSURGICALHX_GEN_ALL_CORE_FT
PAST SURGICAL HISTORY:  Abscess of left buttock s/p I & D    AV fistula placement left lower arm    Breakdown (mechanical) of penile (implanted) prosthesis, sequela     End-stage renal disease (ESRD) PERMACATH PLACEMENT    Renal transplant recipient     S/P cystoscopy stent placement & removal for kidney stones, lithiotripsy    s/p Lt Carpal tunnel 2011

## 2019-12-27 NOTE — CONSULT NOTE ADULT - ASSESSMENT
49M PMhx ESRD on HD s/p DDRT 12/8/19 c/b DGF ATN/focal TMA requiring HD, PLEX 12/12 and plasmapheresis 12/14 now admitted for fever (101.7F) with concern influenza.    Renal transplant consulted given recent DDRT 12/8/19 and Immunosuppression management. 49M PMhx ESRD on HD s/p DDRT 12/8/19 c/b DGF ATN/focal TMA requiring HD, PLEX 12/12 and plasmapheresis 12/14 now admitted for fever (101.7F) with concern influenza given recent sick contact exposure, need r/o other causes.    Renal transplant consulted given recent DDRT 12/8/19 and Immunosuppression management.

## 2019-12-27 NOTE — CONSULT NOTE ADULT - PROBLEM SELECTOR RECOMMENDATION 2
s/p DDRT 12/8/19 c/b DGF ATN/focal TMA requiring HD, PLEX 12/12 and plasmapheresis 12/14, last HD 12/26 (UF)    - continue HD per primary nephrologist  - continue immunosuppression as stated below  - trend SCr, electrolytes, strict I/O, daily standing weight  - Avoiding nephrotoxic agents-NSAIDs, contrast, nephrotoxic antibiotics, ACEI/ARB

## 2019-12-27 NOTE — H&P ADULT - ASSESSMENT
49M PMHx of ESRD on HD  ( via LUE AVF), HTN, Gout, Glaucoma, NET of pancreas (s/p robotic distal panc/splenectomy at [place] 2015 by Dr. Young, followed by Dr. Raphael, [facility] Oncologist), RA with hand contractures. 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 Post op course c/b DGF requiring HD, thrombocytopenia, elevated LDH and Haptoglobulin. Schistocytes  on peripheral smear concerning for TMA. Envarsus held. Received Belatacept 12/13. Started on PLEX (12/12, 12/15). Underwent renal bx on 12/13 c/w profound ATN.  Found to have much improvement to levels, likely related to Tacrolimus. He was discharged home on 12/20/19 w/ outpatient HD; today sent to ED after being found to be febrile to 102 at dialysis center.     Fever  -f/u RVP (mother flu+ )  -f/u CXR  -f/u Renal US- had  minimal amount of light brown drainage from wound; wound not well healed.   -f/u blood/urine cx  -monitor fever curve    s/p DDRT POD#12  - Delayed graft function requiring HD. Making <1 cup urine/daily.   - Renal bx from 12/13 with profound ATN; s/p PLEX 12/12 and 12/15  -Last HD 12/26; plan for inpatient HD today (did not get outpatient HD today)  -f/u ED labs  Immuno: Belatacept: due today (last was on 12/13); will hold off till rg. MMF dec to 500 BID given fevers; Pred 10  - PPX: Nystatin, bactrim, Valcyte renally dosed BIW  -Strict I/Os  -regular diet  -SCDs all the time      HTN  -Continue Metoprolol and hydralazine

## 2019-12-27 NOTE — ED ADULT NURSE NOTE - PMH
Anemia    Brain cyst  had MRI recently- now gone  Carpal tunnel syndrome    Contracture of finger joint  From RA  Erectile dysfunction    ESRD (end stage renal disease) on dialysis  since 7/2013 jarrett murry, sat  Gastric ulcer  Long time ago  Glaucoma    Gout    HTN (hypertension)    Rheumatoid arthritis

## 2019-12-27 NOTE — CONSULT NOTE ADULT - PROBLEM SELECTOR RECOMMENDATION 4
BP in acceptable range.  - continue metoprolol 25 BID and hydralazine 50 BID (hold prior HD)  - monitor vitals BP

## 2019-12-27 NOTE — CONSULT NOTE ADULT - SUBJECTIVE AND OBJECTIVE BOX
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 Patient He underwent DDRT on 12/8/19 (ureteral stent sutured to Martinez - removed 12/15). Post op course c/b DGF requiring HD, thrombocytopenia, elevated LDH and Haptoglobulin. Schistocytes on peripheral smear concerning for TMA. Envarsus held. Received Belatacept 12/13. Started on PLEX (12/12, 12/15). Underwent renal bx on 12/13 c/w profound ATN.  Found to have much improvement to levels, likely related to Tacrolimus. He was discharged home on 12/20/19 with outpatient HD.     Patient was referred to the ED from his dialysis center because he was febrile to 102. Patient notes shortness of breath and cough of ~1 week duration.     In the ED febrile to 101.7, tachycardic > 90 and normotensive. WBC on presentation of 14.87 with 81.9% PMN. RVP with Influenza A. CXR Clear. Started on Tamiflu.    Recipient info:   CPRA:    0%  ABO:      A+  CMVAb:  Positive  EBVIgG Status:  Positive  Last HD:  12/7/2019    Donor ID:  [id number]  Match: [id number]  OPO: NYRT  Age:  49  ABO:  A  KDPI 81%  COD:  Anoxia  X Clamp Time:  12/7/2019 1538.  Medical Hx: DM, HTN, HLD, obesity BMI 53, CAD, CABAGX3  Terminal Cr:  1/1.8/1.7  CMV- Neg EBV- Neg    OR:    DDRT to right iliac fossa. Simulect induction. Two arteries anastomosed to a single cuff on the back table. One vein, one ureter. Ureteral anastomosis performed over a double J stent, which was sutured to the martinez.   Cold ischemia time 25.5 hours. 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  Carpal tunnel syndrome  Brain cyst: had MRI recently- now gone  Anemia  Gastric ulcer: Long time ago  Rheumatoid arthritis  Renal transplant recipient  Breakdown (mechanical) of penile (implanted) prosthesis, sequela  End-stage renal disease (ESRD): PERMACATH PLACEMENT  Abscess of left buttock: s/p I &amp; D  AV fistula: placement left lower arm  S/P cystoscopy: stent placement &amp; removal for kidney stones, lithiotripsy  s/p Lt Carpal tunnel: 2011      Allergies  coconut (Anaphylaxis)  morphine (Pruritus; Rash)    ANTIMICROBIALS (past 90 days)  MEDICATIONS  (STANDING):    piperacillin/tazobactam IVPB.   200 mL/Hr IV Intermittent (12-27-19 @ 10:46)      ANTIMICROBIALS:    nystatin    Suspension 167285 four times a day  oseltamivir 30 once  trimethoprim   80 mG/sulfamethoxazole 400 mG 1 daily  valGANciclovir 450 <User Schedule>    OTHER MEDS: MEDICATIONS  (STANDING):  allopurinol 100 daily  famotidine    Tablet 20 daily  gabapentin 100 two times a day  hydrALAZINE 50 two times a day  metoprolol tartrate 25 two times a day  mycophenolate mofetil 500 two times a day  predniSONE   Tablet 10 daily  senna 2 at bedtime    SOCIAL HISTORY:   hx smoking  non-smoker    FAMILY HISTORY:  Family history of myocardial infarction in first degree male relative  Family history of rheumatoid arthritis  Family history of hypertension in mother  Family history of cerebral aneurysm (Sibling)    REVIEW OF SYSTEMS  [  ] ROS unobtainable because:    [  ] All other systems negative except as noted below:	    Constitutional:  [ ] fever [ ] chills  [ ] weight loss  [ ] weakness  Skin:  [ ] rash [ ] phlebitis	  Eyes: [ ] icterus [ ] pain  [ ] discharge	  ENMT: [ ] sore throat  [ ] thrush [ ] ulcers [ ] exudates  Respiratory: [ ] dyspnea [ ] hemoptysis [ ] cough [ ] sputum	  Cardiovascular:  [ ] chest pain [ ] palpitations [ ] edema	  Gastrointestinal:  [ ] nausea [ ] vomiting [ ] diarrhea [ ] constipation [ ] pain	  Genitourinary:  [ ] dysuria [ ] frequency [ ] hematuria [ ] discharge [ ] flank pain  [ ] incontinence  Musculoskeletal:  [ ] myalgias [ ] arthralgias [ ] arthritis  [ ] back pain  Neurological:  [ ] headache [ ] seizures  [ ] confusion/altered mental status  Psychiatric:  [ ] anxiety [ ] depression	  Hematology/Lymphatics:  [ ] lymphadenopathy  Endocrine:  [ ] adrenal [ ] thyroid  Allergic/Immunologic:	 [ ] transplant [ ] seasonal    Vital Signs Last 24 Hrs  T(F): 101.7 (12-27-19 @ 07:38), Max: 101.7 (12-27-19 @ 07:38)  Vital Signs Last 24 Hrs  HR: 93 (12-27-19 @ 07:38) (93 - 93)  BP: 128/73 (12-27-19 @ 07:38) (128/73 - 128/73)  RR: 16 (12-27-19 @ 07:38)  SpO2: 96% (12-27-19 @ 07:38) (96% - 96%)  Wt(kg): --    PHYSICAL EXAMINATION:  General: Alert and Awake, NAD  HEENT: PERRL, EOMI, No subconjunctival hemorrhages, Oropharynx Clear, MMM  Neck: Supple, No WINIFRED  Cardiac: RRR, No M/R/G  Resp: CTAB, No Wh/Rh/Ra  Abdomen: NBS, NT/ND, No HSM, No rigidity or guarding  MSK: No LE edema. No stigmata of IE. No evidence of phlebitis. No evidence of synovitis.  : No martinez  Skin: No rashes or lesions. Skin is warm and dry to the touch.   Neuro: Alert and Awake. CN 2-12 Grossly intact. Moves all four extremities spontaneously.  Psych: Calm, Pleasant, Cooperative                          7.3    14.87 )-----------( 430      ( 27 Dec 2019 08:36 )             21.7           MICROBIOLOGY:    Rapid RVP Result: Detected (12-27 @ 08:36)    RADIOLOGY:  imaging below personally reviewed      EXAM:  XR CHEST PORTABLE URGENT 1V                        PROCEDURE DATE:  12/27/2019    No acute process. 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 on 12/27 with cough and fever. Patient He underwent DDRT on 12/8/19 (ureteral stent sutured to Martinez - removed 12/15). Post op course c/b DGF requiring HD, thrombocytopenia, elevated LDH and Haptoglobulin. Schistocytes on peripheral smear concerning for TMA. Envarsus held. Received Belatacept 12/13. Started on PLEX (12/12, 12/15). Underwent renal bx on 12/13 c/w profound ATN.  Found to have much improvement to levels, likely related to Tacrolimus. He was discharged home on 12/20/19 with outpatient HD.     Patient was referred to the ED from his dialysis center because he was febrile to 102. Patient notes shortness of breath and cough of ~1 week duration.     In the ED febrile to 101.7, tachycardic > 90 and normotensive. WBC on presentation of 14.87 with 81.9% PMN. RVP with Influenza A. CXR Clear. Started on Tamiflu.    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  Carpal tunnel syndrome  Brain cyst: had MRI recently- now gone  Anemia  Gastric ulcer: Long time ago  Rheumatoid arthritis  Renal transplant recipient  Breakdown (mechanical) of penile (implanted) prosthesis, sequela  End-stage renal disease (ESRD): PERMACATH PLACEMENT  Abscess of left buttock: s/p I &amp; D  AV fistula: placement left lower arm  S/P cystoscopy: stent placement &amp; removal for kidney stones, lithiotripsy  s/p Lt Carpal tunnel: 2011      Allergies  coconut (Anaphylaxis)  morphine (Pruritus; Rash)    ANTIMICROBIALS (past 90 days)  MEDICATIONS  (STANDING):    piperacillin/tazobactam IVPB.   200 mL/Hr IV Intermittent (12-27-19 @ 10:46)      ANTIMICROBIALS:    nystatin    Suspension [id number] four times a day  oseltamivir 30 once  trimethoprim   80 mG/sulfamethoxazole 400 mG 1 daily  valGANciclovir 450 <User Schedule>    OTHER MEDS: MEDICATIONS  (STANDING):  allopurinol 100 daily  famotidine    Tablet 20 daily  gabapentin 100 two times a day  hydrALAZINE 50 two times a day  metoprolol tartrate 25 two times a day  mycophenolate mofetil 500 two times a day  predniSONE   Tablet 10 daily  senna 2 at bedtime    SOCIAL HISTORY: Smokes ~1 cigarette per day. Social EtOH use. No drug use. No recent travel     FAMILY HISTORY:  Family history of myocardial infarction in first degree male relative  Family history of rheumatoid arthritis  Family history of hypertension in mother  Family history of cerebral aneurysm (Sibling)    REVIEW OF SYSTEMS  [  ] ROS unobtainable because:    [ x ] All other systems negative except as noted below:	    Constitutional:  [ x] fever [ ] chills  [ ] weight loss  [ ] weakness  Skin:  [ ] rash [ ] phlebitis	  Eyes: [ ] icterus [ ] pain  [ ] discharge	  ENMT: [ ] sore throat  [ ] thrush [ ] ulcers [ ] exudates  Respiratory: [ ] dyspnea [ ] hemoptysis [x ] cough [ ] sputum	  Cardiovascular:  [ ] chest pain [ ] palpitations [ ] edema	  Gastrointestinal:  [ ] nausea [ ] vomiting [ ] diarrhea [ ] constipation [ ] pain	  Genitourinary:  [ ] dysuria [ ] frequency [ ] hematuria [ ] discharge [ ] flank pain  [ ] incontinence  Musculoskeletal:  [ ] myalgias [ ] arthralgias [ ] arthritis  [ ] back pain  Neurological:  [ ] headache [ ] seizures  [ ] confusion/altered mental status  Psychiatric:  [ ] anxiety [ ] depression	  Hematology/Lymphatics:  [ ] lymphadenopathy  Endocrine:  [ ] adrenal [ ] thyroid  Allergic/Immunologic:	 [ ] transplant [ ] seasonal    Vital Signs Last 24 Hrs  T(F): 101.7 (12-27-19 @ 07:38), Max: 101.7 (12-27-19 @ 07:38)  Vital Signs Last 24 Hrs  HR: 93 (12-27-19 @ 07:38) (93 - 93)  BP: 128/73 (12-27-19 @ 07:38) (128/73 - 128/73)  RR: 16 (12-27-19 @ 07:38)  SpO2: 96% (12-27-19 @ 07:38) (96% - 96%)  Wt(kg): --    PHYSICAL EXAMINATION:  General: Alert and Awake, NAD  HEENT: PERRL, EOMI, No subconjunctival hemorrhages, Oropharynx Clear, MMM  Neck: Supple, No WINIFRED  Cardiac: RRR, No M/R/G  Resp: CTAB, No Wh/Rh/Ra  Abdomen: NBS, NT/ND, No HSM, No rigidity or guarding  MSK: No LE edema. No stigmata of IE. No evidence of phlebitis. No evidence of synovitis.  : No martinez  Skin: No rashes or lesions. Skin is warm and dry to the touch.   Neuro: Alert and Awake. CN 2-12 Grossly intact. Moves all four extremities spontaneously.  Psych: Calm, Pleasant, Cooperative  Vascular: LUE Fistula (No surrounding erythema, drainage or tenderness to palpation)                          7.3    14.87 )-----------( 430      ( 27 Dec 2019 08:36 )             21.7           MICROBIOLOGY:    Rapid RVP Result: Detected (12-27 @ 08:36)    RADIOLOGY:  imaging below personally reviewed      EXAM:  XR CHEST PORTABLE URGENT 1V                        PROCEDURE DATE:  12/27/2019    No acute process.

## 2019-12-27 NOTE — ED ADULT NURSE NOTE - ED STAT RN HANDOFF DETAILS
hand off care report to JESSIE Lopez in dialysis and JESSIE Youngblood 6 Rik. Pt will go from sonogram to dialysis then to [facility].

## 2019-12-27 NOTE — ED ADULT NURSE NOTE - NSIMPLEMENTINTERV_GEN_ALL_ED
Implemented All Universal Safety Interventions:  [place] to call system. Call bell, personal items and telephone within reach. Instruct patient to call for assistance. Room bathroom lighting operational. Non-slip footwear when patient is off stretcher. Physically safe environment: no spills, clutter or unnecessary equipment. Stretcher in lowest position, wheels locked, appropriate side rails in place.

## 2019-12-27 NOTE — ED PROVIDER NOTE - PROGRESS NOTE DETAILS
Case discussed with transplant nephrology who will come see patient. Case discussed with transplant surgery who will also come see patient. Case discussed with transplant who request U/S trans R kidney and admission to their service.

## 2019-12-27 NOTE — CONSULT NOTE ADULT - SUBJECTIVE AND OBJECTIVE BOX
NEPHROLOGY CONSULTATION    CHIEF COMPLAINT:    HPI:  pt is 48yo M w/PMH of ESRD on HD MWF (via LUE AVF), HTN, Gout, Glaucoma, NET of pancreas (s/p robotic distal panc/splenectomy at [place] 2015 by Dr. Young, followed by Dr. Raphael, [facility] Oncologist), RA with hand contractures, s/p DDRT on 12/8/19. Post op course c/b DGF requiring HD, thrombocytopenia, elevated LDH and haptoglobin. Envarsus held. Received Belatacept 12/13. Started on PLEX (12/12, 12/15). Underwent renal bx on 12/13 c/w ATN, focal TMA. He was discharged home on 12/20/19 w/outpatient HD 3 x week. Today sent to ED by dialysis center after he was found to be febrile to 102, he did not get HD this AM. Upon arrival, he was febrile to 101.7, other vitals were stable. He states that he had cough that began 5-6 days ago that has now mostly resolved. He reports no other febrile episodes other than this morning. He admits to having one SOB episode earlier this week that resolved after he got HD. He also states that his mother was hospitalized earlier this week and was flu+. Currently makes less than 1 cup urine/daily. No SOB, CP, dyspnea, HA, dizziness, N/V, diarrhea, constipation.     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  Carpal tunnel syndrome  Brain cyst: had MRI recently- now gone  Anemia  Gastric ulcer: Long time ago  Rheumatoid arthritis  Renal transplant recipient  Breakdown (mechanical) of penile (implanted) prosthesis, sequela  End-stage renal disease (ESRD): PERMACATH PLACEMENT  Abscess of left buttock: s/p I &amp; D  AV fistula: placement left lower arm  S/P cystoscopy: stent placement &amp; removal for kidney stones, lithiotripsy  s/p Lt Carpal tunnel: 2011    SOCIAL HISTORY:  negative    FAMILY HISTORY:  Family history of myocardial infarction in first degree male relative  Family history of rheumatoid arthritis  Family history of hypertension in mother  Family history of cerebral aneurysm (Sibling)    Home Medications:  allopurinol 100 mg oral tablet: 1 tab(s) orally once a day (07 Apr 2015 14:28)  brimonidine 0.2% ophthalmic solution: 1 drop(s) to each affected eye 3 times a day (20 Dec 2019 12:02)  dorzolamide 2% ophthalmic solution: 1 drop(s) to each affected eye 3 times a day (20 Dec 2019 12:02)  epoetin trey: to be administered with HD three times a week (20 Dec 2019 12:02)  famotidine 20 mg oral tablet: 1 tab(s) orally once a day (20 Dec 2019 12:02)  gabapentin 100 mg oral capsule: 1 cap(s) orally 2 times a day (20 Dec 2019 12:02)  hydrALAZINE 50 mg oral tablet: 1 tab(s) orally 2 times a day (20 Dec 2019 12:02)  latanoprost ophthalmic 0.005% ophthalmic solution: 1 drop(s) to each affected eye once a day (at bedtime) (07 Apr 2015 14:28)  metoprolol 25 mg oral tablet: 1 tab(s) orally 2 times a day (07 Apr 2015 14:28)  mycophenolate mofetil 500 mg oral tablet: 2 tab(s) orally 2 times a day (20 Dec 2019 12:02)  nystatin 100,000 units/mL oral suspension: 5 milliliter(s) orally 4 times a day (20 Dec 2019 12:02)  predniSONE: please follow your steroid taper sheet (20 Dec 2019 12:02)  sulfamethoxazole-trimethoprim 400 mg-80 mg oral tablet: 1 tab(s) orally once a day (20 Dec 2019 12:02)  valGANciclovir 450 mg oral tablet: 1 tab(s) orally  (20 Dec 2019 12:02)    Vital Signs Last 24 Hrs  T(C): 38.7 (12-27-19 @ 07:38), Max: 38.7 (12-27-19 @ 07:38)  T(F): 101.7 (12-27-19 @ 07:38), Max: 101.7 (12-27-19 @ 07:38)  HR: 93 (12-27-19 @ 07:38) (93 - 93)  BP: 128/73 (12-27-19 @ 07:38) (128/73 - 128/73)  RR: 16 (12-27-19 @ 07:38) (16 - 16)  SpO2: 96% (12-27-19 @ 07:38) (96% - 96%)    Card S1S2  Lungs b/l air entry  Abd soft, ND  Extr + edema, AVF patent    LABS:                        7.3    14.87 )-----------( 430      ( 27 Dec 2019 08:36 )             21.7     PT/INR - ( 27 Dec 2019 08:36 )   PT: 14.6 sec;   INR: 1.27 ratio       PTT - ( 27 Dec 2019 08:36 )  PTT:30.8 sec    BMP - Pending    A/P:    FluA  Bld cx, urine cx   ESRD on HD  S/p DDRT on 12/8/2019  DGF  S/p transplant kidney bx 12/13: ATN, focal TMA  S/p PLEX 12/13 and 12/15  Immunosuppresion/transplant management per transplant team  HD today  Epogen w/HD  Heparin free NEPHROLOGY CONSULTATION    CHIEF COMPLAINT: fever    HPI:  Pt is 48 yo M w/PMH of ESRD on HD MWF (via LUE AVF), HTN, Gout, Glaucoma, NET of pancreas (s/p robotic distal panc/splenectomy at [place] 2015 by Dr. Young, followed by Dr. Raphael, [facility] Oncologist), RA with hand contractures, s/p DDRT on 12/8/19. Post op course c/b DGF requiring HD, thrombocytopenia, elevated LDH and low haptoglobin. Envarsus held. Received Belatacept 12/13. S/p PLEX 12/12, 12/15. Underwent renal bx on 12/13 c/w ATN and focal TMA. He was discharged home on 12/20/19 w/outpatient HD 3 x week. Today sent to ED from dialysis center after he was found to be febrile to 102, he did not get HD this AM. Mother was recently dx w/Flu. Currently makes less than 1 cup urine/day. No SOB, CP, HA, dizziness, N/V, diarrhea, constipation, awake, alert.     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  Carpal tunnel syndrome  Brain cyst: had MRI recently- now gone  Anemia  Gastric ulcer: Long time ago  Rheumatoid arthritis  Renal transplant recipient  Breakdown (mechanical) of penile (implanted) prosthesis, sequela  End-stage renal disease (ESRD): PERMACATH PLACEMENT  Abscess of left buttock: s/p I &amp; D  AV fistula: placement left lower arm  S/P cystoscopy: stent placement &amp; removal for kidney stones, lithiotripsy  s/p Lt Carpal tunnel: 2011    SOCIAL HISTORY:  negative    FAMILY HISTORY:  Family history of myocardial infarction in first degree male relative  Family history of rheumatoid arthritis  Family history of hypertension in mother  Family history of cerebral aneurysm (Sibling)    Home Medications:  allopurinol 100 mg oral tablet: 1 tab(s) orally once a day (07 Apr 2015 14:28)  brimonidine 0.2% ophthalmic solution: 1 drop(s) to each affected eye 3 times a day (20 Dec 2019 12:02)  dorzolamide 2% ophthalmic solution: 1 drop(s) to each affected eye 3 times a day (20 Dec 2019 12:02)  epoetin trey: to be administered with HD three times a week (20 Dec 2019 12:02)  famotidine 20 mg oral tablet: 1 tab(s) orally once a day (20 Dec 2019 12:02)  gabapentin 100 mg oral capsule: 1 cap(s) orally 2 times a day (20 Dec 2019 12:02)  hydrALAZINE 50 mg oral tablet: 1 tab(s) orally 2 times a day (20 Dec 2019 12:02)  latanoprost ophthalmic 0.005% ophthalmic solution: 1 drop(s) to each affected eye once a day (at bedtime) (07 Apr 2015 14:28)  metoprolol 25 mg oral tablet: 1 tab(s) orally 2 times a day (07 Apr 2015 14:28)  mycophenolate mofetil 500 mg oral tablet: 2 tab(s) orally 2 times a day (20 Dec 2019 12:02)  nystatin 100,000 units/mL oral suspension: 5 milliliter(s) orally 4 times a day (20 Dec 2019 12:02)  predniSONE: please follow your steroid taper sheet (20 Dec 2019 12:02)  sulfamethoxazole-trimethoprim 400 mg-80 mg oral tablet: 1 tab(s) orally once a day (20 Dec 2019 12:02)  valGANciclovir 450 mg oral tablet: 1 tab(s) orally  (20 Dec 2019 12:02)    allopurinol 100 milliGRAM(s) Oral daily  brimonidine 0.2% Ophthalmic Solution 1 Drop(s) Both EYES three times a day  famotidine    Tablet 20 milliGRAM(s) Oral daily  gabapentin 100 milliGRAM(s) Oral two times a day  hydrALAZINE 50 milliGRAM(s) Oral two times a day  latanoprost 0.005% Ophthalmic Solution 1 Drop(s) Both EYES daily  metoprolol tartrate 25 milliGRAM(s) Oral two times a day  mycophenolate mofetil 500 milliGRAM(s) Oral two times a day  nystatin    Suspension [id number] Unit(s) Oral four times a day  oseltamivir 30 milliGRAM(s) Oral once  predniSONE   Tablet 10 milliGRAM(s) Oral daily  senna 2 Tablet(s) Oral at bedtime  trimethoprim   80 mG/sulfamethoxazole 400 mG 1 Tablet(s) Oral daily  valGANciclovir 450 milliGRAM(s) Oral <User Schedule>    Vital Signs Last 24 Hrs  T(C): 37.1 (12-27-19 @ 13:44), Max: 38.7 (12-27-19 @ 07:38)  T(F): 98.8 (12-27-19 @ 13:44), Max: 101.7 (12-27-19 @ 07:38)  HR: 76 (12-27-19 @ 13:44) (76 - 93)  BP: 93/54 (12-27-19 @ 13:44) (93/54 - 128/73)  RR: 18 (12-27-19 @ 13:44) (16 - 18)  SpO2: 99% (12-27-19 @ 13:44) (95% - 99%)    Card S1S2  Lungs b/l air entry  Abd soft, ND  Extr + edema, AVF patent    LABS:                        7.3    14.87 )-----------( 430      ( 27 Dec 2019 08:36 )             21.7     PT/INR - ( 27 Dec 2019 08:36 )   PT: 14.6 sec;   INR: 1.27 ratio       PTT - ( 27 Dec 2019 08:36 )  PTT:30.8 sec    BMP - Pending    A/P:    FluA  Bld cx, urine cx   ESRD on HD  S/p DDRT on 12/8/2019  DGF  S/p transplant kidney bx 12/13: ATN, focal TMA  S/p PLEX 12/13 and 12/15  Immunosuppresion/transplant management per transplant team  HD today  Epogen w/HD  Heparin free

## 2019-12-27 NOTE — H&P ADULT - ATTENDING COMMENTS
agree with above  delayed graft function ; continue dialysis  now tested positive for flu; start Tamiflu

## 2019-12-27 NOTE — CONSULT NOTE ADULT - PROBLEM SELECTOR RECOMMENDATION 3
DDRT 12/8/19    - continue belatacept infusion plan tomorrow 12/28  - decrease MMF 1000 BID to 500 BID given fever  - continue Prednisone 10mg daily  - continue ppx: Nystatin, bactrim, Valcyte renally dosed BIW, pepcid  - monitor CBC DDRT 12/8/19    - plan for belatacept infusion tomorrow 12/28  - decrease MMF 1000 BID to 500 BID given fever  - continue Prednisone 10mg daily  - continue ppx: Nystatin, bactrim, Valcyte renally dosed BIW, pepcid  - monitor CBC

## 2019-12-27 NOTE — ED ADULT NURSE NOTE - OBJECTIVE STATEMENT
Came in as a code sepsis. No distress. Breathing easy and non labored. Ambulatory. Pt was in his dialysis appointment but dialysis didn't go through because of fever. Pt alert and orientedX4. Pt stated that his mother is admitted to another hospital for +flu. Pt's skin warm and dry to touch. No chills. No diaphoresis. Abdominal dressing dry and intact.

## 2019-12-27 NOTE — ED PROVIDER NOTE - NS ED ROS FT
CONST: + fevers, no chills  EYES: no pain, no vision changes  ENT: no sore throat, no ear pain, no change in hearing  CV: no chest pain, no leg swelling  RESP: no shortness of breath, no cough  ABD: no abdominal pain, no nausea, no vomiting, no diarrhea  : no dysuria, no flank pain, no hematuria  MSK: no back pain, no extremity pain  NEURO: no headache or additional neurologic complaints  HEME: no easy bleeding  SKIN:  no rash

## 2019-12-27 NOTE — CONSULT NOTE ADULT - PROBLEM SELECTOR RECOMMENDATION 9
Fever 102F at HD, 101.7F in ED unclear source possible influenza given recent sick contact exposure however need rule out other causes in bacteremia/UTI/PNA/wound infection    - please check renal allograft duplex u/s, CXR  - please obtain blood culture, urine culture, RVP  - hold off abx for now  - trend wbc and fever curve Fever 102F at HD, 101.7F in ED unclear source possible influenza given recent sick contact exposure however need rule out other causes in bacteremia/UTI/PNA/wound infection    - please check renal allograft duplex u/s, CXR  - please obtain blood culture, urine culture, RVP  - f/u ID consult  - trend wbc and fever curve Fever 102F at HD, 101.7F in ED unclear source possible influenza given recent sick contact exposure however need rule out other causes in bacteremia/UTI/PNA/wound infection    - please check renal allograft duplex u/s, CXR  - please obtain blood culture, urine culture, RVP  - f/u ID consult  - decrease MMF 1000 BID to 500 BID given fevers  - trend wbc and fever curve

## 2019-12-27 NOTE — ED PROVIDER NOTE - PHYSICAL EXAMINATION
GENERAL: Awake, alert, NAD  HEENT: NC/AT, moist mucous membranes, PERRL, EOMI  LUNGS: CTAB, no wheezes or crackles   CARDIAC: RRR, no m/r/g  ABDOMEN: Soft, normal BS, non tender, non distended, no rebound, no guarding, well healing surgical site over right inguinal area with scan serosanguinous discharge  BACK: No midline spinal tenderness, no CVA tenderness  EXT: No edema, no calf tenderness, 2+ DP pulses bilaterally, no deformities.  NEURO: A&Ox3. Moving all extremities.  SKIN: Warm and dry. No rash.  PSYCH: Normal affect.

## 2019-12-27 NOTE — ED PROVIDER NOTE - CLINICAL SUMMARY MEDICAL DECISION MAKING FREE TEXT BOX
48 y/o male with hx of renal transplant this month presenting from dialysis with fever. Patient febrile on exam but otherwise non toxic appearing. Will need admission for dialysis and further work up regarding fever given immunocompromise, will order sepsis workup and consult transplant surgery and nephrology.

## 2019-12-27 NOTE — H&P ADULT - NSHPREVIEWOFSYSTEMS_GEN_ALL_CORE
Gen: No weight changes, fatigue, weakness. Admits to fever today.  Skin: No rashes  Head/Eyes/Ears/Mouth: No headache; Normal hearing; Normal vision w/o blurriness; No sinus pain/discomfort, admits to having a sore throat and cough earlier this week that has now significantly improved.   Respiratory: No dyspnea, cough, wheezing, hemoptysis  CV: No chest pain, PND, orthopnea  GI: No abdominal pain, diarrhea, constipation, nausea, vomiting, melena, hematochezia  : No increased frequency, dysuria, hematuria, nocturia  MSK: No joint pain/swelling; no back pain; admits to LE edema that he states has improved since HD yesterday.   Neuro: No dizziness/lightheadedness, weakness, seizures, numbness, tingling  Heme: No easy bruising or bleeding  Endo: No heat/cold intolerance  Psych: No significant nervousness, anxiety, stress, depression  All other systems were reviewed and are negative, except as noted.

## 2019-12-27 NOTE — CONSULT NOTE ADULT - SUBJECTIVE AND OBJECTIVE BOX
[facility] DIVISION OF KIDNEY DISEASES AND HYPERTENSION -- INITIAL CONSULT NOTE  --------------------------------------------------------------------------------  HPI:  49M PMhx ESRD on HD s/p DDRT 12/819 c/b DGF ATN/focal TMA requiring HD, PLEX 12/12 and plasmapheresis 12/14 now admitted for fever.  Patient report his mother currently dx influenza requiring hospitalization at [facility] but after hospital discharge developed cough, generalized weakness for about 3 days.  Today went for regular HD today where found to be febrile 102F thereby sent to ED (no HD today).  Of note, patient had session UF 3.2L yesterday 12/26 given lower extremity edema.  Patient denies any fever, chills, shortness of breath, dysuria, abdominal pain, nausea, vomiting, diarrhea.    In ED, febrile 101.7F, labs pending.  At time of evaluation patient appear comfortably in bed on room air.      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  Carpal tunnel syndrome  Brain cyst: had MRI recently- now gone  Anemia  Gastric ulcer: Long time ago  Rheumatoid arthritis  Renal transplant recipient  Breakdown (mechanical) of penile (implanted) prosthesis, sequela  End-stage renal disease (ESRD): PERMACATH PLACEMENT  Abscess of left buttock: s/p I &amp; D  AV fistula: placement left lower arm  S/P cystoscopy: stent placement &amp; removal for kidney stones, lithiotripsy  s/p Lt Carpal tunnel: 2011    FAMILY HISTORY:  Family history of myocardial infarction in first degree male relative  Family history of rheumatoid arthritis  Family history of hypertension in mother  Family history of cerebral aneurysm (Sibling)    PAST SOCIAL HISTORY:    ALLERGIES & MEDICATIONS  --------------------------------------------------------------------------------  Allergies    coconut (Anaphylaxis)  morphine (Pruritus; Rash)    Intolerances      Standing Inpatient Medications    PRN Inpatient Medications      REVIEW OF SYSTEMS  --------------------------------------------------------------------------------  Gen: + fever.  No weight changes, fatigue, chills, weakness  Skin: No rashes  Respiratory: + productive cough.  No dyspnea, wheezing, hemoptysis  CV: No chest pain, PND, orthopnea  GI: No abdominal pain, diarrhea, constipation, nausea, vomiting, melena  : No increased frequency, dysuria, hematuria  MSK: + edema  Neuro: No dizziness/lightheadedness, weakness      All other systems were reviewed and are negative, except as noted.    VITALS/PHYSICAL EXAM  --------------------------------------------------------------------------------  T(C): 38.7 (12-27-19 @ 07:38), Max: 38.7 (12-27-19 @ 07:38)  HR: 93 (12-27-19 @ 07:38) (93 - 93)  BP: 128/73 (12-27-19 @ 07:38) (128/73 - 128/73)  RR: 16 (12-27-19 @ 07:38) (16 - 16)  SpO2: 96% (12-27-19 @ 07:38) (96% - 96%)  Wt(kg): --  Height (cm): 180.34 (12-27-19 @ 07:38)  Weight (kg): 63.5 (12-27-19 @ 07:38)  BMI (kg/m2): 19.5 (12-27-19 @ 07:38)  BSA (m2): 1.81 (12-27-19 @ 07:38)      Physical Exam:  	Gen: NAD, well-appearing on room air  	HEENT: MMM, supple neck, no cervical LE  	Pulm: CTA B/L  	CV: RRR, S1S2; no murmur  	Abd: +BS, soft, nontender/nondistended, RLQ surgical wound with serosanguinous discharge  	: No suprapubic tenderness  	LE: Warm, no edema  	Skin: Warm, without rashes  	Vascular access:    LABS/STUDIES  --------------------------------------------------------------------------------              7.3    14.87 >-----------<  430      [12-27-19 @ 08:36]              21.7           PT/INR: PT 14.6 , INR 1.27       [12-27-19 @ 08:36]  PTT: 30.8       [12-27-19 @ 08:36]      Creatinine Trend:  SCr 4.93 [12-20 @ 06:36]  SCr 6.51 [12-19 @ 09:32]  SCr 5.20 [12-18 @ 06:10]  SCr 7.20 [12-17 @ 06:58]  SCr 5.79 [12-16 @ 06:01]    Urinalysis - [12-20-19 @ 02:10]      Color Orange / Appearance Turbid / SG 1.027 / pH 7.0      Gluc 100 mg/dL / Ketone Trace  / Bili Small / Urobili 2 mg/dL       Blood Large / Protein 100 mg/dL / Leuk Est Large / Nitrite Negative      RBC 1370 /  / Hyaline 11 / Gran 1 / Sq Epi  / Non Sq Epi 2 / Bacteria Negative      HbA1c 5.0      [12-11-19 @ 08:21]    HBsAg Nonreact      [12-12-19 @ 22:37]  HCV 0.15, Nonreact      [12-12-19 @ 22:37] [facility] DIVISION OF KIDNEY DISEASES AND HYPERTENSION -- INITIAL CONSULT NOTE  --------------------------------------------------------------------------------  HPI:  49M PMhx ESRD on HD LUE AVF s/p DDRT 12/819 c/b DGF (renal biopsy 12/13 + ATN/focal TMA) requiring HD, PLEX 12/12 and plasmapheresis 12/14 today sent from HD center for fever 102F.  Patient report after discharge, he was expose sick contact (his mother dx influenza requiring hospitalization at [facility]) after which developed cough, generalized weakness for about about 5 days.  Today went for regular HD today where found to be febrile 102F thereby sent to ED (no HD today).  Of note, patient had session UF 3.2L yesterday 12/26 for lower extremity edema.  Patient denies any fever, chills, shortness of breath, dysuria, abdominal pain, nausea, vomiting, diarrhea.    In ED, febrile 101.7F, labs pending.  At time of evaluation patient appear comfortably in bed on room air.    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  Carpal tunnel syndrome  Brain cyst: had MRI recently- now gone  Anemia  Gastric ulcer: Long time ago  Rheumatoid arthritis  Renal transplant recipient  Breakdown (mechanical) of penile (implanted) prosthesis, sequela  End-stage renal disease (ESRD): PERMACATH PLACEMENT  Abscess of left buttock: s/p I &amp; D  AV fistula: placement left lower arm  S/P cystoscopy: stent placement &amp; removal for kidney stones, lithiotripsy  s/p Lt Carpal tunnel: 2011    FAMILY HISTORY:  Family history of myocardial infarction in first degree male relative  Family history of rheumatoid arthritis  Family history of hypertension in mother  Family history of cerebral aneurysm (Sibling)    PAST SOCIAL HISTORY:    ALLERGIES & MEDICATIONS  --------------------------------------------------------------------------------  Allergies    coconut (Anaphylaxis)  morphine (Pruritus; Rash)    Intolerances      Standing Inpatient Medications    PRN Inpatient Medications      REVIEW OF SYSTEMS  --------------------------------------------------------------------------------  Gen: + fever.  No weight changes, fatigue, chills, weakness  Skin: No rashes  Respiratory: + productive cough.  No dyspnea, wheezing, hemoptysis  CV: No chest pain, PND, orthopnea  GI: No abdominal pain, diarrhea, constipation, nausea, vomiting, melena  : No increased frequency, dysuria, hematuria  MSK: + edema  Neuro: No dizziness/lightheadedness, weakness      All other systems were reviewed and are negative, except as noted.    VITALS/PHYSICAL EXAM  --------------------------------------------------------------------------------  T(C): 38.7 (12-27-19 @ 07:38), Max: 38.7 (12-27-19 @ 07:38)  HR: 93 (12-27-19 @ 07:38) (93 - 93)  BP: 128/73 (12-27-19 @ 07:38) (128/73 - 128/73)  RR: 16 (12-27-19 @ 07:38) (16 - 16)  SpO2: 96% (12-27-19 @ 07:38) (96% - 96%)  Wt(kg): --  Height (cm): 180.34 (12-27-19 @ 07:38)  Weight (kg): 63.5 (12-27-19 @ 07:38)  BMI (kg/m2): 19.5 (12-27-19 @ 07:38)  BSA (m2): 1.81 (12-27-19 @ 07:38)      Physical Exam:  	Gen: NAD, well-appearing on room air  	HEENT: MMM, supple neck, no cervical LE  	Pulm: CTA B/L  	CV: RRR, S1S2; no murmur  	Abd: +BS, soft, nontender/nondistended, RLQ surgical wound with serosanguinous discharge  	: No suprapubic tenderness  	LE: Warm, no edema  	Skin: Warm, without rashes  	Vascular access:    LABS/STUDIES  --------------------------------------------------------------------------------              7.3    14.87 >-----------<  430      [12-27-19 @ 08:36]              21.7           PT/INR: PT 14.6 , INR 1.27       [12-27-19 @ 08:36]  PTT: 30.8       [12-27-19 @ 08:36]      Creatinine Trend:  SCr 4.93 [12-20 @ 06:36]  SCr 6.51 [12-19 @ 09:32]  SCr 5.20 [12-18 @ 06:10]  SCr 7.20 [12-17 @ 06:58]  SCr 5.79 [12-16 @ 06:01]    Urinalysis - [12-20-19 @ 02:10]      Color Orange / Appearance Turbid / SG 1.027 / pH 7.0      Gluc 100 mg/dL / Ketone Trace  / Bili Small / Urobili 2 mg/dL       Blood Large / Protein 100 mg/dL / Leuk Est Large / Nitrite Negative      RBC 1370 /  / Hyaline 11 / Gran 1 / Sq Epi  / Non Sq Epi 2 / Bacteria Negative      HbA1c 5.0      [12-11-19 @ 08:21]    HBsAg Nonreact      [12-12-19 @ 22:37]  HCV 0.15, Nonreact      [12-12-19 @ 22:37]

## 2019-12-27 NOTE — H&P ADULT - HISTORY OF PRESENT ILLNESS
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 Young, followed by Dr. Raphael, [facility] Oncologist), RA with hand contractures. He underwent DDRT on 12/8/19 (ureteral stent sutured to Martinez - removed 12/15). Post op course c/b DGF requiring HD, thrombocytopenia, elevated LDH and Haptoglobulin. Schistocytes  on peripheral smear concerning for TMA. Envarsus held. Received Belatacept 12/13. Started on PLEX (12/12, 12/15). Underwent renal bx on 12/13 c/w profound ATN.  Found to have much improvement to levels, likely related to Tacrolimus. He was discharged home on 12/20/19 w/ outpatient HD.     He was now sent to ED by dialysis center after he was found to be febrile to 102, he did not get HD this AM, and last full HD session was on 12/26. Upon arrival, he was febrile to 101.7, other vitals were stable. He states that he had a cough that began 5-6 days ago that has now mostly resolved. He reports no other febrile episodes other than this morning. He admits to having one SOB episode earlier this week that resolved after he got HD. He also states that his mother was hospitalized earlier this week and was flu+. Currently makes less than 1 cup urine/daily. He currently denies SOB, CP, dyspnea, HA, dizziness, N/V, diarrhea, constipation.     Recipient info:   CPRA:    0%  ABO:      A+  CMVAb:  Positive  EBVIgG Status:  Positive  Last HD:  12/7/2019    Donor ID:  [id number]  Match: [id number]  OPO: NYRT  Age:  49  ABO:  A  KDPI 81%  COD:  Anoxia  X Clamp Time:  12/7/2019 1538.  Medical Hx: DM, HTN, HLD, obesity BMI 53, CAD, CABAGX3  Terminal Cr:  1/1.8/1.7  CMV- Neg EBV- Neg    OR:    DDRT to right iliac fossa. Simulect induction. Two arteries anastomosed to a single cuff on the back table. One vein, one ureter. Ureteral anastomosis performed over a double J stent, which was sutured to the martinez.   Cold ischemia time 25.5 hours.

## 2019-12-27 NOTE — ED PROVIDER NOTE - ATTENDING CONTRIBUTION TO CARE
49y M hx ESRD sp renal transplant 12/8 c/b ATN necessitating him going back on HD TThSa. Pt is taking Prednisone, cellcept, belatacept. Was DC to home 1 week ago where he resides with his mother who happened to be ill with Flu A at that time and is currently hospitalized for same. Pt endorses cough for past several days. Has chronic body aches due to his RA, no worse than usual. Went for HD this AM and found to be febrile. Pt is well appearing non toxic in NAD, RRR, no murmur, lungs with crackles at BL bases, abd with RLQ surgical site with staples and slight oozing at inferior most aspect, no signif surrounding erythema. Ext wwp. BL Le edema. Will send sepsis ARCOS including labs, bld cx, CXR, rvp. Pt now making small amounts of urine since transplant, will send if voids though unlikely source. Page out to transplant [phone]. Will also contact renal regarding HD.

## 2019-12-27 NOTE — H&P ADULT - NSHPPHYSICALEXAM_GEN_ALL_CORE
Constitutional: Well developed / well nourished  Eyes: Anicteric, PERRLA  ENMT: nc/at  Neck: Supple  Respiratory: b/l crackles at bases.   Cardiovascular: RRR  Gastrointestinal: Soft abdomen, NT, ND. Small amount of light brown fluid expressed from wound.   Genitourinary:  Voiding spontaneously  Extremities: SCD's in place and working bilaterally  Vascular: faintly palpable pulses b/l LE; 2+ pitting edema.   Neurological: A&O x3  Skin: Wound not healed; able to express small amount of brown fluid from wound.    Musculoskeletal: Moving all extremities  Psychiatric: Responsive

## 2019-12-28 DIAGNOSIS — D64.9 ANEMIA, UNSPECIFIED: ICD-10-CM

## 2019-12-28 LAB
ALBUMIN SERPL ELPH-MCNC: 2.7 G/DL — LOW (ref 3.3–5)
ALP SERPL-CCNC: 269 U/L — HIGH (ref 40–120)
ALT FLD-CCNC: 13 U/L — SIGNIFICANT CHANGE UP (ref 10–45)
ANION GAP SERPL CALC-SCNC: 17 MMOL/L — SIGNIFICANT CHANGE UP (ref 5–17)
AST SERPL-CCNC: 23 U/L — SIGNIFICANT CHANGE UP (ref 10–40)
BASOPHILS # BLD AUTO: 0.05 K/UL — SIGNIFICANT CHANGE UP (ref 0–0.2)
BASOPHILS NFR BLD AUTO: 0.3 % — SIGNIFICANT CHANGE UP (ref 0–2)
BILIRUB DIRECT SERPL-MCNC: 0.8 MG/DL — HIGH (ref 0–0.2)
BILIRUB INDIRECT FLD-MCNC: 0.5 MG/DL — SIGNIFICANT CHANGE UP (ref 0.2–1)
BILIRUB SERPL-MCNC: 1.3 MG/DL — HIGH (ref 0.2–1.2)
BLD GP AB SCN SERPL QL: NEGATIVE — SIGNIFICANT CHANGE UP
BUN SERPL-MCNC: 31 MG/DL — HIGH (ref 7–23)
CALCIUM SERPL-MCNC: 8.7 MG/DL — SIGNIFICANT CHANGE UP (ref 8.4–10.5)
CHLORIDE SERPL-SCNC: 90 MMOL/L — LOW (ref 96–108)
CO2 SERPL-SCNC: 25 MMOL/L — SIGNIFICANT CHANGE UP (ref 22–31)
CREAT SERPL-MCNC: 5.72 MG/DL — HIGH (ref 0.5–1.3)
EOSINOPHIL # BLD AUTO: 0.02 K/UL — SIGNIFICANT CHANGE UP (ref 0–0.5)
EOSINOPHIL NFR BLD AUTO: 0.1 % — SIGNIFICANT CHANGE UP (ref 0–6)
GLUCOSE SERPL-MCNC: 110 MG/DL — HIGH (ref 70–99)
HAPTOGLOB SERPL-MCNC: 226 MG/DL — HIGH (ref 34–200)
HCT VFR BLD CALC: 20.9 % — CRITICAL LOW (ref 39–50)
HCT VFR BLD CALC: 23.9 % — LOW (ref 39–50)
HCT VFR BLD CALC: 26 % — LOW (ref 39–50)
HGB BLD-MCNC: 7.1 G/DL — LOW (ref 13–17)
HGB BLD-MCNC: 7.7 G/DL — LOW (ref 13–17)
HGB BLD-MCNC: 8.6 G/DL — LOW (ref 13–17)
IMM GRANULOCYTES NFR BLD AUTO: 1.3 % — SIGNIFICANT CHANGE UP (ref 0–1.5)
LDH SERPL L TO P-CCNC: 624 U/L — HIGH (ref 50–242)
LYMPHOCYTES # BLD AUTO: 0.68 K/UL — LOW (ref 1–3.3)
LYMPHOCYTES # BLD AUTO: 4.6 % — LOW (ref 13–44)
MAGNESIUM SERPL-MCNC: 2 MG/DL — SIGNIFICANT CHANGE UP (ref 1.6–2.6)
MCHC RBC-ENTMCNC: 31.3 PG — SIGNIFICANT CHANGE UP (ref 27–34)
MCHC RBC-ENTMCNC: 31.7 PG — SIGNIFICANT CHANGE UP (ref 27–34)
MCHC RBC-ENTMCNC: 32.2 GM/DL — SIGNIFICANT CHANGE UP (ref 32–36)
MCHC RBC-ENTMCNC: 32.7 PG — SIGNIFICANT CHANGE UP (ref 27–34)
MCHC RBC-ENTMCNC: 33.1 GM/DL — SIGNIFICANT CHANGE UP (ref 32–36)
MCHC RBC-ENTMCNC: 34 GM/DL — SIGNIFICANT CHANGE UP (ref 32–36)
MCV RBC AUTO: 94.5 FL — SIGNIFICANT CHANGE UP (ref 80–100)
MCV RBC AUTO: 96.3 FL — SIGNIFICANT CHANGE UP (ref 80–100)
MCV RBC AUTO: 98.4 FL — SIGNIFICANT CHANGE UP (ref 80–100)
MONOCYTES # BLD AUTO: 0.83 K/UL — SIGNIFICANT CHANGE UP (ref 0–0.9)
MONOCYTES NFR BLD AUTO: 5.6 % — SIGNIFICANT CHANGE UP (ref 2–14)
NEUTROPHILS # BLD AUTO: 13.17 K/UL — HIGH (ref 1.8–7.4)
NEUTROPHILS NFR BLD AUTO: 88.1 % — HIGH (ref 43–77)
NRBC # BLD: 0 /100 WBCS — SIGNIFICANT CHANGE UP (ref 0–0)
PHOSPHATE SERPL-MCNC: 4 MG/DL — SIGNIFICANT CHANGE UP (ref 2.5–4.5)
PLATELET # BLD AUTO: 398 K/UL — SIGNIFICANT CHANGE UP (ref 150–400)
PLATELET # BLD AUTO: 423 K/UL — HIGH (ref 150–400)
PLATELET # BLD AUTO: 444 K/UL — HIGH (ref 150–400)
POTASSIUM SERPL-MCNC: 4.7 MMOL/L — SIGNIFICANT CHANGE UP (ref 3.5–5.3)
POTASSIUM SERPL-SCNC: 4.7 MMOL/L — SIGNIFICANT CHANGE UP (ref 3.5–5.3)
PROT SERPL-MCNC: 6.6 G/DL — SIGNIFICANT CHANGE UP (ref 6–8.3)
RBC # BLD: 2.17 M/UL — LOW (ref 4.2–5.8)
RBC # BLD: 2.43 M/UL — LOW (ref 4.2–5.8)
RBC # BLD: 2.75 M/UL — LOW (ref 4.2–5.8)
RBC # FLD: 18 % — HIGH (ref 10.3–14.5)
RBC # FLD: 18.1 % — HIGH (ref 10.3–14.5)
RBC # FLD: 18.3 % — HIGH (ref 10.3–14.5)
RH IG SCN BLD-IMP: POSITIVE — SIGNIFICANT CHANGE UP
SODIUM SERPL-SCNC: 132 MMOL/L — LOW (ref 135–145)
TACROLIMUS SERPL-MCNC: <2 NG/ML — SIGNIFICANT CHANGE UP
WBC # BLD: 12.83 K/UL — HIGH (ref 3.8–10.5)
WBC # BLD: 14.94 K/UL — HIGH (ref 3.8–10.5)
WBC # BLD: 15.48 K/UL — HIGH (ref 3.8–10.5)
WBC # FLD AUTO: 12.83 K/UL — HIGH (ref 3.8–10.5)
WBC # FLD AUTO: 14.94 K/UL — HIGH (ref 3.8–10.5)
WBC # FLD AUTO: 15.48 K/UL — HIGH (ref 3.8–10.5)

## 2019-12-28 PROCEDURE — 99232 SBSQ HOSP IP/OBS MODERATE 35: CPT | Mod: GC,24

## 2019-12-28 PROCEDURE — 99232 SBSQ HOSP IP/OBS MODERATE 35: CPT

## 2019-12-28 RX ORDER — ERYTHROPOIETIN 10000 [IU]/ML
10000 INJECTION, SOLUTION INTRAVENOUS; SUBCUTANEOUS ONCE
Refills: 0 | Status: COMPLETED | OUTPATIENT
Start: 2019-12-29 | End: 2019-12-29

## 2019-12-28 RX ORDER — METOPROLOL TARTRATE 50 MG
12.5 TABLET ORAL EVERY 12 HOURS
Refills: 0 | Status: DISCONTINUED | OUTPATIENT
Start: 2019-12-28 | End: 2020-01-01

## 2019-12-28 RX ORDER — METOPROLOL TARTRATE 50 MG
12.5 TABLET ORAL
Refills: 0 | Status: DISCONTINUED | OUTPATIENT
Start: 2019-12-28 | End: 2019-12-28

## 2019-12-28 RX ADMIN — Medication 12.5 MILLIGRAM(S): at 17:10

## 2019-12-28 RX ADMIN — BRIMONIDINE TARTRATE 1 DROP(S): 2 SOLUTION/ DROPS OPHTHALMIC at 06:14

## 2019-12-28 RX ADMIN — MYCOPHENOLATE MOFETIL 500 MILLIGRAM(S): 250 CAPSULE ORAL at 17:10

## 2019-12-28 RX ADMIN — GABAPENTIN 100 MILLIGRAM(S): 400 CAPSULE ORAL at 17:10

## 2019-12-28 RX ADMIN — BRIMONIDINE TARTRATE 1 DROP(S): 2 SOLUTION/ DROPS OPHTHALMIC at 14:52

## 2019-12-28 RX ADMIN — Medication 500000 UNIT(S): at 11:35

## 2019-12-28 RX ADMIN — Medication 100 MILLIGRAM(S): at 11:35

## 2019-12-28 RX ADMIN — BELATACEPT 200 MILLIGRAM(S): 250 INJECTION, POWDER, LYOPHILIZED, FOR SOLUTION INTRAVENOUS at 11:35

## 2019-12-28 RX ADMIN — MYCOPHENOLATE MOFETIL 500 MILLIGRAM(S): 250 CAPSULE ORAL at 06:14

## 2019-12-28 RX ADMIN — Medication 500000 UNIT(S): at 06:13

## 2019-12-28 RX ADMIN — FAMOTIDINE 20 MILLIGRAM(S): 10 INJECTION INTRAVENOUS at 11:35

## 2019-12-28 RX ADMIN — Medication 1 TABLET(S): at 11:35

## 2019-12-28 RX ADMIN — LATANOPROST 1 DROP(S): 0.05 SOLUTION/ DROPS OPHTHALMIC; TOPICAL at 16:39

## 2019-12-28 RX ADMIN — BRIMONIDINE TARTRATE 1 DROP(S): 2 SOLUTION/ DROPS OPHTHALMIC at 21:13

## 2019-12-28 RX ADMIN — Medication 500000 UNIT(S): at 00:59

## 2019-12-28 RX ADMIN — Medication 10 MILLIGRAM(S): at 06:14

## 2019-12-28 RX ADMIN — GABAPENTIN 100 MILLIGRAM(S): 400 CAPSULE ORAL at 06:14

## 2019-12-28 RX ADMIN — Medication 500000 UNIT(S): at 17:10

## 2019-12-28 RX ADMIN — Medication 25 MILLIGRAM(S): at 06:14

## 2019-12-28 NOTE — PROGRESS NOTE ADULT - ATTENDING COMMENTS
agree with above  DGF  immunosuppression tacro, low dose mmf and steroids  flu positive; started on tamiflu  perinephric hematoma; and drop in Ht; will transfuse one unit

## 2019-12-28 NOTE — PROGRESS NOTE ADULT - ASSESSMENT
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 Flu A positive. CXR clear.       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   Flu A positive. CXR clear.   No other focal signs or symptoms of infection. Looks well and actually denies any respiratory symptoms today.     Suggest  -continue Tamiflu 30 mg PO post-HD (no doses on non-HD days)  -f/u blood cultures     Francisco Lane MD   Infectious Disease   Pager [phone]   After 5PM and on weekends please page fellow on call or call [phone]

## 2019-12-28 NOTE — PROGRESS NOTE ADULT - PROBLEM SELECTOR PLAN 1
Pt with DGF secondary to ATN  Also had TMA and developing hypotension on dialysis  Oliguric.  Will assess daily for dialysis

## 2019-12-28 NOTE — PROGRESS NOTE ADULT - SUBJECTIVE AND OBJECTIVE BOX
Transplant Surgery - Multidisciplinary Rounds  --------------------------------------------------------------  R DDRT    Date:  12/8 - Dr. Espinal           Present:   Patient seen with multidisciplinary team including Transplant Surgeon: Dr. Cruz,  Transplant Nephrologist: Dr. SHARLENE Puckett. Nurse Practitioner:  Sarah Barrios in am rounds and examined with Dr. Cruz    Disciplines not in attendance will be notified of the plan.       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 Young, followed by Dr. Raphael, [facility] Oncologist), RA with hand contractures. He underwent DDRT on 12/8/19 (ureteral stent sutured to Martinez - removed 12/15). Post op course c/b DGF requiring HD, thrombocytopenia, elevated LDH and Haptoglobulin. Schistocytes  on peripheral smear concerning for TMA. Envarsus held. Received Belatacept 12/13. Started on PLEX (12/12, 12/15). Underwent renal bx on 12/13 c/w profound ATN.  Found to have much improvement to levels, likely related to Tacrolimus. He was discharged home on 12/20/19 w/ outpatient HD.     He was now sent to ED by dialysis center after he was found to be febrile to 102, he did not get HD this AM, and last full HD session was on 12/26. Upon arrival, he was febrile to 101.7, other vitals were stable. He states that he had a cough that began 5-6 days ago that has now mostly resolved. He reports no other febrile episodes other than this morning. He admits to having one SOB episode earlier this week that resolved after he got HD. He also states that his mother was hospitalized earlier this week and was flu+. Currently makes less than 1 cup urine/daily. He currently denies SOB, CP, dyspnea, HA, dizziness, N/V, diarrhea, constipation.     Recipient info:   CPRA:    0%  ABO:      A+  CMVAb:  Positive  EBVIgG Status:  Positive  Last HD:  12/7/2019    Donor ID:  [id number]  Match: [id number]  OPO: NYRT  Age:  49  ABO:  A  KDPI 81%  COD:  Anoxia  X Clamp Time:  12/7/2019 1538.  Medical Hx: DM, HTN, HLD, obesity BMI 53, CAD, CABAGX3  Terminal Cr:  1/1.8/1.7  CMV- Neg EBV- Neg    OR:    DDRT to right iliac fossa. Simulect induction. Two arteries anastomosed to a single cuff on the back table. One vein, one ureter. Ureteral anastomosis performed over a double J stent, which was sutured to the martinez.   Cold ischemia time 25.5 hours.      Interval events:    - afebrile overnight   - started on tamiflu 30mg PO after HD x 3 doses  - received HD yesterday 2L removed  - asymptomatic hypotension overnight  BP 90s  - renal u/s yesterday demonstrated increasing perinephric hematoma     Potential Discharge date: pending clinical improvement    Education:  Medications    Plan of care:  See Below    MEDICATIONS  (STANDING):  allopurinol 100 milliGRAM(s) Oral daily  brimonidine 0.2% Ophthalmic Solution 1 Drop(s) Both EYES three times a day  famotidine    Tablet 20 milliGRAM(s) Oral daily  gabapentin 100 milliGRAM(s) Oral two times a day  latanoprost 0.005% Ophthalmic Solution 1 Drop(s) Both EYES daily  metoprolol tartrate 12.5 milliGRAM(s) Oral two times a day  mycophenolate mofetil 500 milliGRAM(s) Oral two times a day  nystatin    Suspension 736906 Unit(s) Oral four times a day  predniSONE   Tablet 10 milliGRAM(s) Oral daily  senna 2 Tablet(s) Oral at bedtime  trimethoprim   80 mG/sulfamethoxazole 400 mG 1 Tablet(s) Oral daily  valGANciclovir 450 milliGRAM(s) Oral <User Schedule>    MEDICATIONS  (PRN):      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  Carpal tunnel syndrome  Brain cyst: had MRI recently- now gone  Anemia  Gastric ulcer: Long time ago  Rheumatoid arthritis  Renal transplant recipient  Breakdown (mechanical) of penile (implanted) prosthesis, sequela  End-stage renal disease (ESRD): PERMACATH PLACEMENT  Abscess of left buttock: s/p I &amp; D  AV fistula: placement left lower arm  S/P cystoscopy: stent placement &amp; removal for kidney stones, lithiotripsy  s/p Lt Carpal tunnel: 2011      Vital Signs Last 24 Hrs  T(C): 36.8 (28 Dec 2019 09:00), Max: 39.1 (27 Dec 2019 21:00)  T(F): 98.2 (28 Dec 2019 09:00), Max: 102.4 (27 Dec 2019 21:00)  HR: 70 (28 Dec 2019 09:00) (68 - 87)  BP: 96/57 (28 Dec 2019 09:00) (93/52 - 132/47)  BP(mean): --  RR: 18 (28 Dec 2019 09:00) (18 - 18)  SpO2: 99% (28 Dec 2019 09:00) (97% - 100%)    I&O's Summary    27 Dec 2019 07:01  -  28 Dec 2019 07:00  --------------------------------------------------------  IN: 1150 mL / OUT: 2950 mL / NET: -1800 mL                              7.7    14.94 )-----------( 423      ( 28 Dec 2019 08:54 )             23.9     12-28    132<L>  |  90<L>  |  31<H>  ----------------------------<  110<H>  4.7   |  25  |  5.72<H>    Ca    8.7      28 Dec 2019 07:02  Phos  4.0     12-28  Mg     2.0     12-28    TPro  6.6  /  Alb  2.7<L>  /  TBili  1.3<H>  /  DBili  0.8<H>  /  AST  23  /  ALT  13  /  AlkPhos  269<H>  12-28    Tacrolimus (), Serum: <2.0 ng/mL (12-28 @ 10:29)          Review of systems   Gen: No weight changes, fatigue, weakness.   	Skin: No rashes  	Head/Eyes/Ears/Mouth: No headache; Normal hearing; Normal vision w/o blurriness; No sinus pain/discomfort, admits to having a sore throat and cough earlier this week that has now significantly improved.   	Respiratory: Pos occasional cough No dyspnea, wheezing, hemoptysis  	CV: No chest pain, PND, orthopnea  	GI: No abdominal pain, diarrhea, constipation, nausea, vomiting, melena, hematochezia  	: No increased frequency, dysuria, hematuria, nocturia  	MSK: No joint pain/swelling; no back pain; admits to LE edema that he states has improved since HD yesterday.   	Neuro: No dizziness/lightheadedness, weakness, seizures, numbness, tingling  	Heme: No easy bruising or bleeding  	Endo: No heat/cold intolerance  	Psych: No significant nervousness, anxiety, stress, depression  All other systems were reviewed and are negative, except as noted.    PHYSICAL EXAM:  Constitutional: Well developed / well nourished  Eyes: Anicteric, PERRLA  ENMT: nc/at  Neck: Supple  Respiratory: b/l small crackles at bases.   Cardiovascular: RRR  Gastrointestinal: Soft abdomen, NT, ND. Small amount of light brown fluid expressed from wound.   Genitourinary:  Voiding spontaneously  Extremities: SCD's in place and working bilaterally  Vascular: faintly palpable pulses b/l LE; 2+ pitting edema.   Neurological: A&O x3  Skin: Wound not healed; able to express small amount of brown fluid from wound.    Musculoskeletal: Moving all extremities Psychiatric: Responsive

## 2019-12-28 NOTE — PROGRESS NOTE ADULT - ASSESSMENT
50 yo M with hx of ESRD on HD, s/p DDRT on 12/8, elevated SCr on HD using AVF, complicated by TMA related to CNI, now on belatacept, admitted with influenza.

## 2019-12-28 NOTE — PROGRESS NOTE ADULT - SUBJECTIVE AND OBJECTIVE BOX
[facility] DIVISION OF KIDNEY DISEASES AND HYPERTENSION -- FOLLOW UP NOTE  --------------------------------------------------------------------------------  Chief Complaint:  fever    24 hour events/subjective:  Pt diagnosed with influenza.  Started on tamiflu.  Has perinephric hematoma and low Hb.  BP low after dialysis yesterday.       PAST HISTORY  --------------------------------------------------------------------------------  No significant changes to PMH, PSH, FHx, SHx, unless otherwise noted    ALLERGIES & MEDICATIONS  --------------------------------------------------------------------------------  Allergies    coconut (Anaphylaxis)  morphine (Pruritus; Rash)    Intolerances      Standing Inpatient Medications  allopurinol 100 milliGRAM(s) Oral daily  belatacept IVPB 650 milliGRAM(s) IV Intermittent once  brimonidine 0.2% Ophthalmic Solution 1 Drop(s) Both EYES three times a day  famotidine    Tablet 20 milliGRAM(s) Oral daily  gabapentin 100 milliGRAM(s) Oral two times a day  latanoprost 0.005% Ophthalmic Solution 1 Drop(s) Both EYES daily  metoprolol tartrate 25 milliGRAM(s) Oral every 12 hours  mycophenolate mofetil 500 milliGRAM(s) Oral two times a day  nystatin    Suspension 269454 Unit(s) Oral four times a day  predniSONE   Tablet 10 milliGRAM(s) Oral daily  senna 2 Tablet(s) Oral at bedtime  trimethoprim   80 mG/sulfamethoxazole 400 mG 1 Tablet(s) Oral daily  valGANciclovir 450 milliGRAM(s) Oral <User Schedule>    PRN Inpatient Medications      REVIEW OF SYSTEMS  --------------------------------------------------------------------------------  Gen: + weakness  Skin: No rashes  Head/Eyes/Ears/Mouth: No headache;No sore throat  Respiratory: + cough    All other systems were reviewed and are negative, except as noted.    VITALS/PHYSICAL EXAM  --------------------------------------------------------------------------------  T(C): 36.8 (12-28-19 @ 09:00), Max: 39.1 (12-27-19 @ 21:00)  HR: 70 (12-28-19 @ 09:00) (68 - 88)  BP: 96/57 (12-28-19 @ 09:00) (93/52 - 132/47)  RR: 18 (12-28-19 @ 09:00) (18 - 18)  SpO2: 99% (12-28-19 @ 09:00) (95% - 100%)  Wt(kg): --  Height (cm): 180.34 (12-27-19 @ 07:38)  Weight (kg): 63.5 (12-27-19 @ 07:38)  BMI (kg/m2): 19.5 (12-27-19 @ 07:38)  BSA (m2): 1.81 (12-27-19 @ 07:38)      12-27-19 @ 07:01  -  12-28-19 @ 07:00  --------------------------------------------------------  IN: 1150 mL / OUT: 2950 mL / NET: -1800 mL      Physical Exam:  	Gen: NAD  	HEENT: PERRL, supple neck, clear oropharynx  	Pulm: CTA B/L  	CV: RRR, S1S2; no rub  	Back: No spinal or CVA tenderness; no sacral edema  	Abd: +BS, soft, nontender/nondistended                      Transplant: mild oozing from wound site.   	: No suprapubic tenderness  	UE: Warm, FROM; no edema; no asterixis  	LE: Warm, FROM; +1-2 b/l LE pitting edema  	Neuro: No focal deficits  	Psych: Normal affect and mood  	Skin: Warm, without rashes      LABS/STUDIES  --------------------------------------------------------------------------------              7.7    14.94 >-----------<  423      [12-28-19 @ 08:54]              23.9     132  |  90  |  31  ----------------------------<  110      [12-28-19 @ 07:02]  4.7   |  25  |  5.72        Ca     8.7     [12-28-19 @ 07:02]      Mg     2.0     [12-28-19 @ 07:02]      Phos  4.0     [12-28-19 @ 07:02]    TPro  6.6  /  Alb  2.7  /  TBili  1.3  /  DBili  0.8  /  AST  23  /  ALT  13  /  AlkPhos  269  [12-28-19 @ 07:02]    PT/INR: PT 14.6 , INR 1.27       [12-27-19 @ 08:36]  PTT: 30.8       [12-27-19 @ 08:36]      Creatinine Trend:  SCr 5.72 [12-28 @ 07:02]  SCr 7.79 [12-27 @ 14:02]  SCr 7.64 [12-27 @ 08:36]  SCr 4.93 [12-20 @ 06:36]  SCr 6.51 [12-19 @ 09:32]              Urinalysis - [12-20-19 @ 02:10]      Color Orange / Appearance Turbid / SG 1.027 / pH 7.0      Gluc 100 mg/dL / Ketone Trace  / Bili Small / Urobili 2 mg/dL       Blood Large / Protein 100 mg/dL / Leuk Est Large / Nitrite Negative      RBC 1370 /  / Hyaline 11 / Gran 1 / Sq Epi  / Non Sq Epi 2 / Bacteria Negative      HbA1c 5.0      [12-11-19 @ 08:21]    HBsAg Nonreact      [12-12-19 @ 22:37]  HCV 0.15, Nonreact      [12-12-19 @ 22:37]

## 2019-12-28 NOTE — PROGRESS NOTE ADULT - SUBJECTIVE AND OBJECTIVE BOX
Denies complaints, UO very poor, fever    Vital Signs Last 24 Hrs  T(C): 36.9 (12-28-19 @ 12:34), Max: 39.1 (12-27-19 @ 21:00)  T(F): 98.4 (12-28-19 @ 12:34), Max: 102.4 (12-27-19 @ 21:00)  HR: 74 (12-28-19 @ 12:34) (68 - 87)  BP: 100/58 (12-28-19 @ 12:34) (93/52 - 132/47)  RR: 18 (12-28-19 @ 12:34) (18 - 18)  SpO2: 96% (12-28-19 @ 12:34) (96% - 100%)    Card S1S2  Lungs b/l air entry  Abd soft, ND  Extr + edema, AVF patent                        7.7    14.94 )-----------( 423      ( 28 Dec 2019 08:54 )             23.9     28 Dec 2019 07:02    132    |  90     |  31     ----------------------------<  110    4.7     |  25     |  5.72     Ca    8.7        28 Dec 2019 07:02  Phos  4.0       28 Dec 2019 07:02  Mg     2.0       28 Dec 2019 07:02    TPro  6.6    /  Alb  2.7    /  TBili  1.3    /  DBili  0.8    /  AST  23     /  ALT  13     /  AlkPhos  269    28 Dec 2019 07:02    LIVER FUNCTIONS - ( 28 Dec 2019 07:02 )  Alb: 2.7 g/dL / Pro: 6.6 g/dL / ALK PHOS: 269 U/L / ALT: 13 U/L / AST: 23 U/L / GGT: x           PT/INR - ( 27 Dec 2019 08:36 )   PT: 14.6 sec;   INR: 1.27 ratio      allopurinol 100 milliGRAM(s) Oral daily  brimonidine 0.2% Ophthalmic Solution 1 Drop(s) Both EYES three times a day  famotidine    Tablet 20 milliGRAM(s) Oral daily  gabapentin 100 milliGRAM(s) Oral two times a day  latanoprost 0.005% Ophthalmic Solution 1 Drop(s) Both EYES daily  metoprolol tartrate 12.5 milliGRAM(s) Oral every 12 hours  mycophenolate mofetil 500 milliGRAM(s) Oral two times a day  nystatin    Suspension 274513 Unit(s) Oral four times a day  predniSONE   Tablet 10 milliGRAM(s) Oral daily  senna 2 Tablet(s) Oral at bedtime  trimethoprim   80 mG/sulfamethoxazole 400 mG 1 Tablet(s) Oral daily  valGANciclovir 450 milliGRAM(s) Oral <User Schedule>    A/P:    Flu A  F/u Bld cx, urine cx   ESRD on HD  S/p DDRT on 12/8/2019  DGF  S/p transplant kidney bx 12/13: ATN, focal TMA  S/p PLEX 12/13 and 12/15  Immunosuppresion/transplant management per transplant team  HD tomorrow  Epogen w/HD  Heparin free

## 2019-12-28 NOTE — PROGRESS NOTE ADULT - ASSESSMENT
49M PMHx of ESRD on HD  ( via LUE AVF), HTN, Gout, Glaucoma, NET of pancreas (s/p robotic distal panc/splenectomy at [place] 2015 by Dr. Young, followed by Dr. Raphael, [facility] Oncologist), RA with hand contractures. 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 Post op course c/b DGF requiring HD, thrombocytopenia, elevated LDH and Haptoglobulin. Schistocytes  on peripheral smear concerning for TMA. Envarsus held. Received Belatacept 12/13. Started on PLEX (12/12, 12/15). Underwent renal bx on 12/13 c/w profound ATN.  Found to have much improvement to levels, likely related to Tacrolimus. He was discharged home on 12/20/19 w/ outpatient HD; today sent to ED after being found to be febrile to 102 at dialysis center.     Influenza  - RVP + influenza  -f/u blood/urine cx sent in ED  - Tamiflu 30 mg PO after HD x 3 doses (first dose yesterday 12/29  -monitor fever curve    s/p DDRT POD#13  - Delayed graft function requiring HD. Making <1 cup urine/daily.   - Renal bx from 12/13 with profound ATN; s/p PLEX 12/12 and 12/15  -Last HD 12/27; no  HD today  Immuno: Belatacept:  today (last was on 12/13); will hold off till rg. MMF dec to 500 BID given fevers; Pred 10  - PPX: Nystatin, bactrim, Valcyte renally dosed BIW  -Strict I/Os  -regular diet  -SCDs all the time  - check LDH/ Hapto in setting of anemia  - PRBC x 1 today     HTN  -decrease Metoprolol to 12.5 BID   hold  hydralazine

## 2019-12-28 NOTE — PROGRESS NOTE ADULT - SUBJECTIVE AND OBJECTIVE BOX
Follow Up:      Interval History/ROS:     Allergies  coconut (Anaphylaxis)  morphine (Pruritus; Rash)        ANTIMICROBIALS:  nystatin    Suspension 687938 four times a day  trimethoprim   80 mG/sulfamethoxazole 400 mG 1 daily  valGANciclovir 450 <User Schedule>      OTHER MEDS:  allopurinol 100 milliGRAM(s) Oral daily  brimonidine 0.2% Ophthalmic Solution 1 Drop(s) Both EYES three times a day  famotidine    Tablet 20 milliGRAM(s) Oral daily  gabapentin 100 milliGRAM(s) Oral two times a day  latanoprost 0.005% Ophthalmic Solution 1 Drop(s) Both EYES daily  metoprolol tartrate 12.5 milliGRAM(s) Oral two times a day  mycophenolate mofetil 500 milliGRAM(s) Oral two times a day  predniSONE   Tablet 10 milliGRAM(s) Oral daily  senna 2 Tablet(s) Oral at bedtime      Vital Signs Last 24 Hrs  T(C): 36.8 (28 Dec 2019 09:00), Max: 39.1 (27 Dec 2019 21:00)  T(F): 98.2 (28 Dec 2019 09:00), Max: 102.4 (27 Dec 2019 21:00)  HR: 70 (28 Dec 2019 09:00) (68 - 87)  BP: 96/57 (28 Dec 2019 09:00) (93/52 - 132/47)  BP(mean): --  RR: 18 (28 Dec 2019 09:00) (18 - 18)  SpO2: 99% (28 Dec 2019 09:00) (97% - 100%)    Physical Exam:  General: NAD, non toxic  Head: atraumatic, normocephalic  Eye: normal sclera and conjunctiva  ENT: no oropharyngeal lesions, no cervical lymphadenopathy   Cardio: regular rate and rhythm   Respiratory: nonlabored on room air, clear bilaterally, no wheezing  abd: soft, bowel sounds present, no tenderness  : no CVAT, no suprapubic tenderness, no  martinez  Musculoskeletal: no joint swelling, no edema  vascular: no phlebitis   Skin: no rash  Neurologic: no focal deficit  psych: normal affect                          7.7    14.94 )-----------( 423      ( 28 Dec 2019 08:54 )             23.9       12-28    132<L>  |  90<L>  |  31<H>  ----------------------------<  110<H>  4.7   |  25  |  5.72<H>    Ca    8.7      28 Dec 2019 07:02  Phos  4.0     12-28  Mg     2.0     12-28    TPro  6.6  /  Alb  2.7<L>  /  TBili  1.3<H>  /  DBili  0.8<H>  /  AST  23  /  ALT  13  /  AlkPhos  269<H>  12-28    MICROBIOLOGY:  Blood cultures in lab     Rapid RVP Result: Detected (12-27 @ 08:36)    RADIOLOGY:  Images below reviewed personally  US Trans Kidney w/ Doppler, Right (12.27.19 @ 12:41)   Elevated velocities at the renal artery anastomosis, similar in appearance to 12/18/2019. Recommend continued follow upDoppler ultrasound.  High impedance waveforms throughout the transplant kidney, new since 12/18/2019, nonspecific. Recommend continued follow-up.  A 9.4 cm perinephric hematoma, previously documented as measuring 5.7 cm.  Urothelial thickening, nonspecific.     Xray Chest 1 View- PORTABLE-Urgent (12.27.19 @ 10:53)   The mediastinal cardiac silhouette is unremarkable.  The lungs are clear.   No acute osseous finding. Follow Up: Fevers, Flu A    Interval History/ROS: Fever last night 102.4F around 9PM. Denies cough, dyspnea, rhinorrhea or sore throat. No diarrhea or dysuria. He's very upset that he had a transplant in the first place and wishes he stayed on dialysis.     Allergies  coconut (Anaphylaxis)  morphine (Pruritus; Rash)    ANTIMICROBIALS:  nystatin    Suspension [id number] four times a day  trimethoprim   80 mG/sulfamethoxazole 400 mG 1 daily  valGANciclovir 450 <User Schedule>      OTHER MEDS:  allopurinol 100 milliGRAM(s) Oral daily  brimonidine 0.2% Ophthalmic Solution 1 Drop(s) Both EYES three times a day  famotidine    Tablet 20 milliGRAM(s) Oral daily  gabapentin 100 milliGRAM(s) Oral two times a day  latanoprost 0.005% Ophthalmic Solution 1 Drop(s) Both EYES daily  metoprolol tartrate 12.5 milliGRAM(s) Oral two times a day  mycophenolate mofetil 500 milliGRAM(s) Oral two times a day  predniSONE   Tablet 10 milliGRAM(s) Oral daily  senna 2 Tablet(s) Oral at bedtime      Vital Signs Last 24 Hrs  T(C): 36.8 (28 Dec 2019 09:00), Max: 39.1 (27 Dec 2019 21:00)  T(F): 98.2 (28 Dec 2019 09:00), Max: 102.4 (27 Dec 2019 21:00)  HR: 70 (28 Dec 2019 09:00) (68 - 87)  BP: 96/57 (28 Dec 2019 09:00) (93/52 - 132/47)  BP(mean): --  RR: 18 (28 Dec 2019 09:00) (18 - 18)  SpO2: 99% (28 Dec 2019 09:00) (97% - 100%)    Physical Exam:  General: NAD, non toxic  Head: atraumatic, normocephalic  Eye: normal sclera and conjunctiva  Cardio: regular rate and rhythm   Respiratory: nonlabored on room air  Musculoskeletal: edema to legs   Neurologic: no focal deficit  psych: angry                           7.7    14.94 )-----------( 423      ( 28 Dec 2019 08:54 )             23.9       12-28    132<L>  |  90<L>  |  31<H>  ----------------------------<  110<H>  4.7   |  25  |  5.72<H>    Ca    8.7      28 Dec 2019 07:02  Phos  4.0     12-28  Mg     2.0     12-28    TPro  6.6  /  Alb  2.7<L>  /  TBili  1.3<H>  /  DBili  0.8<H>  /  AST  23  /  ALT  13  /  AlkPhos  269<H>  12-28    MICROBIOLOGY:  Blood cultures in lab     Rapid RVP Result: Detected (12-27 @ 08:36)    RADIOLOGY:  Images below reviewed personally  US Trans Kidney w/ Doppler, Right (12.27.19 @ 12:41)   Elevated velocities at the renal artery anastomosis, similar in appearance to 12/18/2019. Recommend continued follow upDoppler ultrasound.  High impedance waveforms throughout the transplant kidney, new since 12/18/2019, nonspecific. Recommend continued follow-up.  A 9.4 cm perinephric hematoma, previously documented as measuring 5.7 cm.  Urothelial thickening, nonspecific.     Xray Chest 1 View- PORTABLE-Urgent (12.27.19 @ 10:53)   The mediastinal cardiac silhouette is unremarkable.  The lungs are clear.   No acute osseous finding.

## 2019-12-29 LAB
ALBUMIN SERPL ELPH-MCNC: 2.9 G/DL — LOW (ref 3.3–5)
ALP SERPL-CCNC: 249 U/L — HIGH (ref 40–120)
ALT FLD-CCNC: 12 U/L — SIGNIFICANT CHANGE UP (ref 10–45)
ANION GAP SERPL CALC-SCNC: 20 MMOL/L — HIGH (ref 5–17)
APPEARANCE UR: ABNORMAL
AST SERPL-CCNC: 13 U/L — SIGNIFICANT CHANGE UP (ref 10–40)
BACTERIA # UR AUTO: NEGATIVE — SIGNIFICANT CHANGE UP
BILIRUB DIRECT SERPL-MCNC: 0.6 MG/DL — HIGH (ref 0–0.2)
BILIRUB INDIRECT FLD-MCNC: 0.5 MG/DL — SIGNIFICANT CHANGE UP (ref 0.2–1)
BILIRUB SERPL-MCNC: 1.1 MG/DL — SIGNIFICANT CHANGE UP (ref 0.2–1.2)
BILIRUB UR-MCNC: ABNORMAL
BUN SERPL-MCNC: 48 MG/DL — HIGH (ref 7–23)
CALCIUM SERPL-MCNC: 8.7 MG/DL — SIGNIFICANT CHANGE UP (ref 8.4–10.5)
CHLORIDE SERPL-SCNC: 89 MMOL/L — LOW (ref 96–108)
CO2 SERPL-SCNC: 23 MMOL/L — SIGNIFICANT CHANGE UP (ref 22–31)
COLOR SPEC: ABNORMAL
CREAT SERPL-MCNC: 7.36 MG/DL — HIGH (ref 0.5–1.3)
DIFF PNL FLD: ABNORMAL
EPI CELLS # UR: 8 /HPF — HIGH (ref 0–5)
GLUCOSE SERPL-MCNC: 94 MG/DL — SIGNIFICANT CHANGE UP (ref 70–99)
GLUCOSE UR QL: NEGATIVE — SIGNIFICANT CHANGE UP
HCT VFR BLD CALC: 23.5 % — LOW (ref 39–50)
HGB BLD-MCNC: 8.2 G/DL — LOW (ref 13–17)
HYALINE CASTS # UR AUTO: 5 /LPF — SIGNIFICANT CHANGE UP (ref 0–7)
KETONES UR-MCNC: SIGNIFICANT CHANGE UP
LEUKOCYTE ESTERASE UR-ACNC: ABNORMAL
MAGNESIUM SERPL-MCNC: 2.1 MG/DL — SIGNIFICANT CHANGE UP (ref 1.6–2.6)
MCHC RBC-ENTMCNC: 32.3 PG — SIGNIFICANT CHANGE UP (ref 27–34)
MCHC RBC-ENTMCNC: 34.9 GM/DL — SIGNIFICANT CHANGE UP (ref 32–36)
MCV RBC AUTO: 92.5 FL — SIGNIFICANT CHANGE UP (ref 80–100)
NITRITE UR-MCNC: NEGATIVE — SIGNIFICANT CHANGE UP
NRBC # BLD: 0 /100 WBCS — SIGNIFICANT CHANGE UP (ref 0–0)
PH UR: 7.5 — SIGNIFICANT CHANGE UP (ref 5–8)
PHOSPHATE SERPL-MCNC: 4 MG/DL — SIGNIFICANT CHANGE UP (ref 2.5–4.5)
PLATELET # BLD AUTO: 423 K/UL — HIGH (ref 150–400)
POTASSIUM SERPL-MCNC: 4.3 MMOL/L — SIGNIFICANT CHANGE UP (ref 3.5–5.3)
POTASSIUM SERPL-SCNC: 4.3 MMOL/L — SIGNIFICANT CHANGE UP (ref 3.5–5.3)
PROT SERPL-MCNC: 6.7 G/DL — SIGNIFICANT CHANGE UP (ref 6–8.3)
PROT UR-MCNC: ABNORMAL
RBC # BLD: 2.54 M/UL — LOW (ref 4.2–5.8)
RBC # FLD: 18.4 % — HIGH (ref 10.3–14.5)
RBC CASTS # UR COMP ASSIST: >720 /HPF — HIGH (ref 0–4)
SODIUM SERPL-SCNC: 132 MMOL/L — LOW (ref 135–145)
SP GR SPEC: 1.02 — SIGNIFICANT CHANGE UP (ref 1.01–1.02)
UROBILINOGEN FLD QL: SIGNIFICANT CHANGE UP
WBC # BLD: 11.09 K/UL — HIGH (ref 3.8–10.5)
WBC # FLD AUTO: 11.09 K/UL — HIGH (ref 3.8–10.5)
WBC UR QL: >720 /HPF — HIGH (ref 0–5)

## 2019-12-29 PROCEDURE — 99232 SBSQ HOSP IP/OBS MODERATE 35: CPT | Mod: GC,24

## 2019-12-29 PROCEDURE — 99232 SBSQ HOSP IP/OBS MODERATE 35: CPT

## 2019-12-29 RX ORDER — DIPHENHYDRAMINE HCL 50 MG
25 CAPSULE ORAL ONCE
Refills: 0 | Status: DISCONTINUED | OUTPATIENT
Start: 2019-12-29 | End: 2020-01-01

## 2019-12-29 RX ORDER — MYCOPHENOLATE MOFETIL 250 MG/1
1000 CAPSULE ORAL
Refills: 0 | Status: DISCONTINUED | OUTPATIENT
Start: 2019-12-29 | End: 2020-01-01

## 2019-12-29 RX ADMIN — BRIMONIDINE TARTRATE 1 DROP(S): 2 SOLUTION/ DROPS OPHTHALMIC at 14:24

## 2019-12-29 RX ADMIN — GABAPENTIN 100 MILLIGRAM(S): 400 CAPSULE ORAL at 19:04

## 2019-12-29 RX ADMIN — Medication 500000 UNIT(S): at 00:14

## 2019-12-29 RX ADMIN — Medication 100 MILLIGRAM(S): at 11:49

## 2019-12-29 RX ADMIN — Medication 500000 UNIT(S): at 05:44

## 2019-12-29 RX ADMIN — MYCOPHENOLATE MOFETIL 1000 MILLIGRAM(S): 250 CAPSULE ORAL at 19:04

## 2019-12-29 RX ADMIN — Medication 500000 UNIT(S): at 19:05

## 2019-12-29 RX ADMIN — GABAPENTIN 100 MILLIGRAM(S): 400 CAPSULE ORAL at 05:38

## 2019-12-29 RX ADMIN — Medication 500000 UNIT(S): at 11:47

## 2019-12-29 RX ADMIN — Medication 12.5 MILLIGRAM(S): at 19:04

## 2019-12-29 RX ADMIN — MYCOPHENOLATE MOFETIL 500 MILLIGRAM(S): 250 CAPSULE ORAL at 05:40

## 2019-12-29 RX ADMIN — Medication 1 TABLET(S): at 11:47

## 2019-12-29 RX ADMIN — ERYTHROPOIETIN 10000 UNIT(S): 10000 INJECTION, SOLUTION INTRAVENOUS; SUBCUTANEOUS at 15:45

## 2019-12-29 RX ADMIN — BRIMONIDINE TARTRATE 1 DROP(S): 2 SOLUTION/ DROPS OPHTHALMIC at 05:38

## 2019-12-29 RX ADMIN — FAMOTIDINE 20 MILLIGRAM(S): 10 INJECTION INTRAVENOUS at 11:47

## 2019-12-29 RX ADMIN — Medication 12.5 MILLIGRAM(S): at 05:40

## 2019-12-29 RX ADMIN — Medication 10 MILLIGRAM(S): at 05:41

## 2019-12-29 RX ADMIN — Medication 30 MILLIGRAM(S): at 19:59

## 2019-12-29 RX ADMIN — BRIMONIDINE TARTRATE 1 DROP(S): 2 SOLUTION/ DROPS OPHTHALMIC at 21:00

## 2019-12-29 RX ADMIN — LATANOPROST 1 DROP(S): 0.05 SOLUTION/ DROPS OPHTHALMIC; TOPICAL at 11:48

## 2019-12-29 NOTE — PROGRESS NOTE ADULT - ASSESSMENT
48 yo M with hx of ESRD on HD, s/p DDRT on 12/8, elevated SCr on HD using AVF, complicated by TMA related to CNI, now on belatacept, admitted with influenza.

## 2019-12-29 NOTE — PROGRESS NOTE ADULT - PROBLEM SELECTOR PLAN 1
Pt with DGF secondary to ATN  Also had TMA and developing hypotension on dialysis  Edematous - plan for dialysis today  Wound draining - few staples removed by Dr. Cruz,

## 2019-12-29 NOTE — PROGRESS NOTE ADULT - ATTENDING COMMENTS
clinically unchanged  remains oliguric. on dialysis  immunosuppression tacro, mmf and steroids  on tamiflu

## 2019-12-29 NOTE — PROGRESS NOTE ADULT - ASSESSMENT
49M PMHx of ESRD on HD  ( via LUE AVF), HTN, Gout, Glaucoma, NET of pancreas (s/p robotic distal panc/splenectomy at [place] 2015 by Dr. Young, followed by Dr. Raphael, [facility] Oncologist), RA with hand contractures. 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 Post op course c/b DGF requiring HD, thrombocytopenia, elevated LDH and Haptoglobulin. Schistocytes  on peripheral smear concerning for TMA. Envarsus held. Received Belatacept 12/13. Started on PLEX (12/12, 12/15). Underwent renal bx on 12/13 c/w profound ATN.  Found to have much improvement to levels, likely related to Tacrolimus. He was discharged home on 12/20/19 w/ outpatient HD; today sent to ED after being found to be febrile to 102 at dialysis center.     Influenza  - RVP + influenza  -f/u blood/urine cx sent in ED NGTD  - Tamiflu 30 mg PO after HD x 3 doses (first dose yesterday 12/27, 2nd dose due today)   -monitor fever curve    s/p DDRT POD#13  - Delayed graft function requiring HD. Making <1 cup urine/daily.   - Renal bx from 12/13 with profound ATN; s/p PLEX 12/12 and 12/15  - HD as per nephrology   Immuno: Belatacept: given 12/28, Inc MMF to 1 gm BID, Pred 10  - PPX: Nystatin, bactrim, Valcyte renally dosed BIW  -Strict I/Os  -regular diet  -SCDs all the time  repeat U/S tomorrow  - check LDH/ Hapto in setting of anemia  - PRBC x 1 today ( received 1 unit yest 12/28)    HTN  -c/w  Metoprolol to 12.5 BID   hold  hydralazine

## 2019-12-29 NOTE — PROGRESS NOTE ADULT - SUBJECTIVE AND OBJECTIVE BOX
Transplant Surgery - Multidisciplinary Rounds  --------------------------------------------------------------  R DDRT    Date:   - Dr. Espinal         Present:   Patient seen with multidisciplinary team including Transplant Surgeon: Dr. Cruz,  Transplant Nephrologist: Dr. SHARLENE Puckett. Nurse Practitioner:  Sarah Barrios in am rounds and examined with Dr. Cruz    Disciplines not in attendance will be notified of the plan.       HPI: 49M PMHx of ESRD on HD  ( via LUE AVF), HTN, Gout, Glaucoma, NET of pancreas (s/p robotic distal panc/splenectomy at [place]  by Dr. Young, followed by Dr. Raphael, [facility] Oncologist), RA with hand contractures. He underwent DDRT on 19 (ureteral stent sutured to Martinez - removed 12/15). Post op course c/b DGF requiring HD, thrombocytopenia, elevated LDH and Haptoglobulin. Schistocytes  on peripheral smear concerning for TMA. Envarsus held. Received Belatacept . Started on PLEX (, 12/15). Underwent renal bx on  c/w profound ATN.  Found to have much improvement to levels, likely related to Tacrolimus. He was discharged home on 19 w/ outpatient HD.     He was now sent to ED by dialysis center after he was found to be febrile to 102, he did not get HD this AM, and last full HD session was on . Upon arrival, he was febrile to 101.7, other vitals were stable. He states that he had a cough that began 5-6 days ago that has now mostly resolved. He reports no other febrile episodes other than this morning. He admits to having one SOB episode earlier this week that resolved after he got HD. He also states that his mother was hospitalized earlier this week and was flu+. Currently makes less than 1 cup urine/daily. He currently denies SOB, CP, dyspnea, HA, dizziness, N/V, diarrhea, constipation.     Recipient info:   CPRA:    0%  ABO:      A+  CMVAb:  Positive  EBVIgG Status:  Positive  Last HD:  2019    Donor ID:  [id number]  Match: [id number]  OPO: NYRT  Age:  49  ABO:  A  KDPI 81%  COD:  Anoxia  X Clamp Time:  2019 1538.  Medical Hx: DM, HTN, HLD, obesity BMI 53, CAD, CABAGX3  Terminal Cr:  1/1.8/1.7  CMV- Neg EBV- Neg    OR:    DDRT to right iliac fossa. Simulect induction. Two arteries anastomosed to a single cuff on the back table. One vein, one ureter. Ureteral anastomosis performed over a double J stent, which was sutured to the martinez.   Cold ischemia time 25.5 hours.      Interval events:    - remains afebrile no c/o cough, SOB or body aches   - blood cult  NGTD  - started on tamiflu 30mg PO after HD x 3 doses (1st dose, due today after HD)  - received 1unit PRBC for anemia   - c/o increased drainage from previous BO site  - renal u/s demonstrated increasing perinephric hematoma     Potential Discharge date: pending clinical improvement    Education:  Medications    Plan of care:  See Below    MEDICATIONS  (STANDING):  allopurinol 100 milliGRAM(s) Oral daily  brimonidine 0.2% Ophthalmic Solution 1 Drop(s) Both EYES three times a day  epoetin trey Injectable 50358 Unit(s) IV Push once  famotidine    Tablet 20 milliGRAM(s) Oral daily  gabapentin 100 milliGRAM(s) Oral two times a day  latanoprost 0.005% Ophthalmic Solution 1 Drop(s) Both EYES daily  metoprolol tartrate 12.5 milliGRAM(s) Oral every 12 hours  mycophenolate mofetil 1000 milliGRAM(s) Oral two times a day  nystatin    Suspension 347085 Unit(s) Oral four times a day  predniSONE   Tablet 10 milliGRAM(s) Oral daily  senna 2 Tablet(s) Oral at bedtime  trimethoprim   80 mG/sulfamethoxazole 400 mG 1 Tablet(s) Oral daily  valGANciclovir 450 milliGRAM(s) Oral <User Schedule>    MEDICATIONS  (PRN):      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  Carpal tunnel syndrome  Brain cyst: had MRI recently- now gone  Anemia  Gastric ulcer: Long time ago  Rheumatoid arthritis  Renal transplant recipient  Breakdown (mechanical) of penile (implanted) prosthesis, sequela  End-stage renal disease (ESRD): PERMACATH PLACEMENT  Abscess of left buttock: s/p I &amp; D  AV fistula: placement left lower arm  S/P cystoscopy: stent placement &amp; removal for kidney stones, lithiotripsy  s/p Lt Carpal tunnel:       Vital Signs Last 24 Hrs  T(C): 37.3 (29 Dec 2019 08:21), Max: 37.3 (29 Dec 2019 08:21)  T(F): 99.1 (29 Dec 2019 08:21), Max: 99.1 (29 Dec 2019 08:21)  HR: 70 (29 Dec 2019 08:21) (68 - 74)  BP: 120/74 (29 Dec 2019 08:21) (111/66 - 120/74)  BP(mean): --  RR: 18 (29 Dec 2019 08:21) (17 - 18)  SpO2: 96% (29 Dec 2019 08:21) (96% - 98%)    I&O's Summary    28 Dec 2019 07:01  -  29 Dec 2019 07:00  --------------------------------------------------------  IN: 940 mL / OUT: 70 mL / NET: 870 mL          LABS:                        8.2    11.09 )-----------( 423      ( 29 Dec 2019 06:36 )             23.5         132<L>  |  89<L>  |  48<H>  ----------------------------<  94  4.3   |  23  |  7.36<H>    Ca    8.7      29 Dec 2019 06:36  Phos  4.0       Mg     2.1         TPro  6.7  /  Alb  2.9<L>  /  TBili  1.1  /  DBili  0.6<H>  /  AST  13  /  ALT  12  /  AlkPhos  249<H>  12-29      Urinalysis Basic - ( 29 Dec 2019 03:40 )    Color: Dark Brown / Appearance: Turbid / S.021 / pH: x  Gluc: x / Ketone: Trace  / Bili: Small / Urobili: <2 mg/dL   Blood: x / Protein: 100 mg/dL / Nitrite: Negative   Leuk Esterase: Large / RBC: >720 /HPF / WBC >720 /HPF   Sq Epi: x / Non Sq Epi: 8 /HPF / Bacteria: Negative      CAPILLARY BLOOD GLUCOSE        LIVER FUNCTIONS - ( 29 Dec 2019 06:36 )  Alb: 2.9 g/dL / Pro: 6.7 g/dL / ALK PHOS: 249 U/L / ALT: 12 U/L / AST: 13 U/L / GGT: x             Culture - Blood (collected 19 @ 13:12)  Source: .Blood Blood-Peripheral  Preliminary Report (19 @ 14:01):    No growth to date.    Culture - Blood (collected 19 @ 13:12)  Source: .Blood Blood-Peripheral  Preliminary Report (19 @ 14:01):    No growth to date.      Tacrolimus (), Serum: <2.0 ng/mL ( @ 10:29)      Cultures:    Culture - Blood (collected 19 @ 13:12)  Source: .Blood Blood-Peripheral  Preliminary Report (19 @ 14:01):    No growth to date.    Culture - Blood (collected 19 @ 13:12)  Source: .Blood Blood-Peripheral  Preliminary Report (19 @ 14:01):    No growth to date.        Review of systems   Gen: No weight changes, fatigue, weakness.   	Skin: No rashes  	Head/Eyes/Ears/Mouth: No headache; Normal hearing; Normal vision w/o blurriness; No sinus pain/discomfort, admits to having a sore throat and cough earlier this week that has now significantly improved.   	Respiratory: Pos occasional cough No dyspnea, wheezing, hemoptysis  	CV: No chest pain, PND, orthopnea  	GI: No abdominal pain, diarrhea, constipation, nausea, vomiting, melena, hematochezia  	: No increased frequency, dysuria, hematuria, nocturia  	MSK: No joint pain/swelling; no back pain; admits to LE edema that he states has improved since HD yesterday.   	Neuro: No dizziness/lightheadedness, weakness, seizures, numbness, tingling  	Heme: No easy bruising or bleeding  	Endo: No heat/cold intolerance  	Psych: No significant nervousness, anxiety, stress, depression  All other systems were reviewed and are negative, except as noted.    PHYSICAL EXAM:  Constitutional: Well developed / well nourished  Eyes: Anicteric, PERRLA  ENMT: nc/at  Neck: Supple  Respiratory: CTA b/l    Cardiovascular: RRR  Gastrointestinal: Soft abdomen, NT, ND. mod amount of light brown fluid expressed from wound.   Genitourinary:  Voiding spontaneously  Extremities: SCD's in place and working bilaterally  Vascular: faintly palpable pulses b/l LE; 2+ pitting edema.   Neurological: A&O x3  Skin: Wound not healed; able to express small amount of brown fluid from wound.    Musculoskeletal: Moving all extremities Psychiatric: Responsive

## 2019-12-29 NOTE — PROGRESS NOTE ADULT - SUBJECTIVE AND OBJECTIVE BOX
Denies complaints, UO very poor, fever resolved    Vital Signs Last 24 Hrs  T(C): 37.1 (19 @ 14:13), Max: 37.3 (19 @ 08:21)  T(F): 98.8 (19 @ 14:13), Max: 99.1 (19 @ 08:21)  HR: 76 (19 @ 14:13) (68 - 76)  BP: 114/65 (19 @ 14:13) (111/66 - 120/74)  RR: 18 (19 @ 14:13) (17 - 18)  SpO2: 98% (19 @ 14:13) (96% - 98%)    Card S1S2  Lungs b/l air entry  Abd soft, ND  Extr + edema, AVF patent                                8.2    11.09 )-----------( 423      ( 29 Dec 2019 06:36 )             23.5     29 Dec 2019 06:36    132    |  89     |  48     ----------------------------<  94     4.3     |  23     |  7.36     Ca    8.7        29 Dec 2019 06:36  Phos  4.0       29 Dec 2019 06:36  Mg     2.1       29 Dec 2019 06:36    TPro  6.7    /  Alb  2.9    /  TBili  1.1    /  DBili  0.6    /  AST  13     /  ALT  12     /  AlkPhos  249    29 Dec 2019 06:36    LIVER FUNCTIONS - ( 29 Dec 2019 06:36 )  Alb: 2.9 g/dL / Pro: 6.7 g/dL / ALK PHOS: 249 U/L / ALT: 12 U/L / AST: 13 U/L / GGT: x           Urinalysis Basic - ( 29 Dec 2019 03:40 )    Color: Dark Brown / Appearance: Turbid / S.021 / pH: x  Gluc: x / Ketone: Trace  / Bili: Small / Urobili: <2 mg/dL   Blood: x / Protein: 100 mg/dL / Nitrite: Negative   Leuk Esterase: Large / RBC: >720 /HPF / WBC >720 /HPF   Sq Epi: x / Non Sq Epi: 8 /HPF / Bacteria: Negative    allopurinol 100 milliGRAM(s) Oral daily  brimonidine 0.2% Ophthalmic Solution 1 Drop(s) Both EYES three times a day  epoetin trey Injectable 08951 Unit(s) IV Push once  famotidine    Tablet 20 milliGRAM(s) Oral daily  gabapentin 100 milliGRAM(s) Oral two times a day  latanoprost 0.005% Ophthalmic Solution 1 Drop(s) Both EYES daily  metoprolol tartrate 12.5 milliGRAM(s) Oral every 12 hours  mycophenolate mofetil 1000 milliGRAM(s) Oral two times a day  nystatin    Suspension 842686 Unit(s) Oral four times a day  predniSONE   Tablet 10 milliGRAM(s) Oral daily  senna 2 Tablet(s) Oral at bedtime  trimethoprim   80 mG/sulfamethoxazole 400 mG 1 Tablet(s) Oral daily  valGANciclovir 450 milliGRAM(s) Oral <User Schedule>    A/P:    Flu A  ESRD on HD  S/p DDRT 2019  DGF  S/p transplant kidney bx : ATN, focal TMA  S/p PLEX  and 12/15  Immunosuppresion/transplant management per transplant team  HD today  Epogen w/HD  Heparin free  D/w transplant team

## 2019-12-29 NOTE — PROGRESS NOTE ADULT - SUBJECTIVE AND OBJECTIVE BOX
[facility] DIVISION OF KIDNEY DISEASES AND HYPERTENSION -- FOLLOW UP NOTE  --------------------------------------------------------------------------------  Chief Complaint:  kidney transplant, influenza    24 hour events/subjective:  Pt feels swollen.  Wound is draining a lot.  Afebrile      PAST HISTORY  --------------------------------------------------------------------------------  No significant changes to PMH, PSH, FHx, SHx, unless otherwise noted    ALLERGIES & MEDICATIONS  --------------------------------------------------------------------------------  Allergies    coconut (Anaphylaxis)  morphine (Pruritus; Rash)    Intolerances      Standing Inpatient Medications  allopurinol 100 milliGRAM(s) Oral daily  brimonidine 0.2% Ophthalmic Solution 1 Drop(s) Both EYES three times a day  epoetin trey Injectable 32608 Unit(s) IV Push once  famotidine    Tablet 20 milliGRAM(s) Oral daily  gabapentin 100 milliGRAM(s) Oral two times a day  latanoprost 0.005% Ophthalmic Solution 1 Drop(s) Both EYES daily  metoprolol tartrate 12.5 milliGRAM(s) Oral every 12 hours  mycophenolate mofetil 500 milliGRAM(s) Oral two times a day  nystatin    Suspension 298965 Unit(s) Oral four times a day  predniSONE   Tablet 10 milliGRAM(s) Oral daily  senna 2 Tablet(s) Oral at bedtime  trimethoprim   80 mG/sulfamethoxazole 400 mG 1 Tablet(s) Oral daily  valGANciclovir 450 milliGRAM(s) Oral <User Schedule>    PRN Inpatient Medications      REVIEW OF SYSTEMS  --------------------------------------------------------------------------------  Gen: No fatigue, fevers/chills, weakness  Skin: No rashes  Head/Eyes/Ears/Mouth: No headache;No sore throat  Respiratory: No dyspnea, cough,   CV: No chest pain, PND, orthopnea  GI: No abdominal pain, diarrhea, constipation, nausea, vomiting  Transplant: pain by transplant site, drainage.   : No increased frequency, dysuria, hematuria, nocturia  MSK: No joint pain/swelling; no back pain; no edema  Neuro: No dizziness/lightheadedness, weakness, seizures, numbness, tingling  Psych: No significant nervousness, anxiety, stress, depression    All other systems were reviewed and are negative, except as noted.    VITALS/PHYSICAL EXAM  --------------------------------------------------------------------------------  T(C): 37.3 (12-29-19 @ 08:21), Max: 37.3 (12-29-19 @ 08:21)  HR: 70 (12-29-19 @ 08:21) (68 - 74)  BP: 120/74 (12-29-19 @ 08:21) (100/58 - 120/74)  RR: 18 (12-29-19 @ 08:21) (17 - 18)  SpO2: 96% (12-29-19 @ 08:21) (96% - 98%)  Wt(kg): --        12-28-19 @ 07:01  -  12-29-19 @ 07:00  --------------------------------------------------------  IN: 940 mL / OUT: 70 mL / NET: 870 mL      Physical Exam:  	Gen: NAD  	HEENT: PERRL, supple neck, clear oropharynx  	Pulm: CTA B/L  	CV: RRR, S1S2; no rub  	Back: No spinal or CVA tenderness; no sacral edema  	Abd: +BS, soft, nontender/nondistended                      Transplant: drainage from wound  	: No suprapubic tenderness  	UE: Warm, FROM; no edema; no asterixis  	LE: +1-2 LE edema  	Neuro: No focal deficits  	Psych: Normal affect and mood  	Skin: Warm, without rashes      LABS/STUDIES  --------------------------------------------------------------------------------              8.2    11.09 >-----------<  423      [12-29-19 @ 06:36]              23.5     132  |  89  |  48  ----------------------------<  94      [12-29-19 @ 06:36]  4.3   |  23  |  7.36        Ca     8.7     [12-29-19 @ 06:36]      Mg     2.1     [12-29-19 @ 06:36]      Phos  4.0     [12-29-19 @ 06:36]    TPro  6.7  /  Alb  2.9  /  TBili  1.1  /  DBili  0.6  /  AST  13  /  ALT  12  /  AlkPhos  249  [12-29-19 @ 06:36]              [12-28-19 @ 07:02]    Creatinine Trend:  SCr 7.36 [12-29 @ 06:36]  SCr 5.72 [12-28 @ 07:02]  SCr 7.79 [12-27 @ 14:02]  SCr 7.64 [12-27 @ 08:36]  SCr 4.93 [12-20 @ 06:36]    Tacrolimus (), Serum: <2.0 ng/mL (12-28 @ 10:29)            Urinalysis - [12-29-19 @ 03:40]      Color Dark Brown / Appearance Turbid / SG 1.021 / pH 7.5      Gluc Negative / Ketone Trace  / Bili Small / Urobili <2 mg/dL       Blood Large / Protein 100 mg/dL / Leuk Est Large / Nitrite Negative      RBC >720 / WBC >720 / Hyaline 5 / Gran  / Sq Epi  / Non Sq Epi 8 / Bacteria Negative      HbA1c 5.0      [12-11-19 @ 08:21]    HBsAg Nonreact      [12-12-19 @ 22:37]  HCV 0.15, Nonreact      [12-12-19 @ 22:37]

## 2019-12-30 ENCOUNTER — APPOINTMENT (OUTPATIENT)
Dept: TRANSPLANT | Facility: CLINIC | Age: 49
End: 2019-12-30

## 2019-12-30 DIAGNOSIS — J10.1 INFLUENZA DUE TO OTHER IDENTIFIED INFLUENZA VIRUS WITH OTHER RESPIRATORY MANIFESTATIONS: ICD-10-CM

## 2019-12-30 LAB
ALBUMIN SERPL ELPH-MCNC: 3 G/DL — LOW (ref 3.3–5)
ALP SERPL-CCNC: 296 U/L — HIGH (ref 40–120)
ALT FLD-CCNC: 11 U/L — SIGNIFICANT CHANGE UP (ref 10–45)
ANION GAP SERPL CALC-SCNC: 15 MMOL/L — SIGNIFICANT CHANGE UP (ref 5–17)
AST SERPL-CCNC: 22 U/L — SIGNIFICANT CHANGE UP (ref 10–40)
BASOPHILS # BLD AUTO: 0 K/UL — SIGNIFICANT CHANGE UP (ref 0–0.2)
BASOPHILS NFR BLD AUTO: 0 % — SIGNIFICANT CHANGE UP (ref 0–2)
BILIRUB DIRECT SERPL-MCNC: 0.5 MG/DL — HIGH (ref 0–0.2)
BILIRUB INDIRECT FLD-MCNC: 0.5 MG/DL — SIGNIFICANT CHANGE UP (ref 0.2–1)
BILIRUB SERPL-MCNC: 1 MG/DL — SIGNIFICANT CHANGE UP (ref 0.2–1.2)
BUN SERPL-MCNC: 30 MG/DL — HIGH (ref 7–23)
CALCIUM SERPL-MCNC: 8.9 MG/DL — SIGNIFICANT CHANGE UP (ref 8.4–10.5)
CHLORIDE SERPL-SCNC: 90 MMOL/L — LOW (ref 96–108)
CO2 SERPL-SCNC: 27 MMOL/L — SIGNIFICANT CHANGE UP (ref 22–31)
CREAT SERPL-MCNC: 5.19 MG/DL — HIGH (ref 0.5–1.3)
EOSINOPHIL # BLD AUTO: 0.26 K/UL — SIGNIFICANT CHANGE UP (ref 0–0.5)
EOSINOPHIL NFR BLD AUTO: 2.6 % — SIGNIFICANT CHANGE UP (ref 0–6)
GLUCOSE SERPL-MCNC: 94 MG/DL — SIGNIFICANT CHANGE UP (ref 70–99)
HCT VFR BLD CALC: 25.8 % — LOW (ref 39–50)
HGB BLD-MCNC: 8.6 G/DL — LOW (ref 13–17)
LYMPHOCYTES # BLD AUTO: 1.94 K/UL — SIGNIFICANT CHANGE UP (ref 1–3.3)
LYMPHOCYTES # BLD AUTO: 19.3 % — SIGNIFICANT CHANGE UP (ref 13–44)
MAGNESIUM SERPL-MCNC: 2 MG/DL — SIGNIFICANT CHANGE UP (ref 1.6–2.6)
MCHC RBC-ENTMCNC: 30.6 PG — SIGNIFICANT CHANGE UP (ref 27–34)
MCHC RBC-ENTMCNC: 33.3 GM/DL — SIGNIFICANT CHANGE UP (ref 32–36)
MCV RBC AUTO: 91.8 FL — SIGNIFICANT CHANGE UP (ref 80–100)
MONOCYTES # BLD AUTO: 0.26 K/UL — SIGNIFICANT CHANGE UP (ref 0–0.9)
MONOCYTES NFR BLD AUTO: 2.6 % — SIGNIFICANT CHANGE UP (ref 2–14)
NEUTROPHILS # BLD AUTO: 7.6 K/UL — HIGH (ref 1.8–7.4)
NEUTROPHILS NFR BLD AUTO: 75.5 % — SIGNIFICANT CHANGE UP (ref 43–77)
PHOSPHATE SERPL-MCNC: 2.6 MG/DL — SIGNIFICANT CHANGE UP (ref 2.5–4.5)
PLATELET # BLD AUTO: 434 K/UL — HIGH (ref 150–400)
POTASSIUM SERPL-MCNC: 3.7 MMOL/L — SIGNIFICANT CHANGE UP (ref 3.5–5.3)
POTASSIUM SERPL-SCNC: 3.7 MMOL/L — SIGNIFICANT CHANGE UP (ref 3.5–5.3)
PROT SERPL-MCNC: 6.6 G/DL — SIGNIFICANT CHANGE UP (ref 6–8.3)
RBC # BLD: 2.81 M/UL — LOW (ref 4.2–5.8)
RBC # FLD: 18.2 % — HIGH (ref 10.3–14.5)
SODIUM SERPL-SCNC: 132 MMOL/L — LOW (ref 135–145)
WBC # BLD: 10.06 K/UL — SIGNIFICANT CHANGE UP (ref 3.8–10.5)
WBC # FLD AUTO: 10.06 K/UL — SIGNIFICANT CHANGE UP (ref 3.8–10.5)

## 2019-12-30 PROCEDURE — 99232 SBSQ HOSP IP/OBS MODERATE 35: CPT | Mod: GC

## 2019-12-30 PROCEDURE — 99232 SBSQ HOSP IP/OBS MODERATE 35: CPT

## 2019-12-30 PROCEDURE — 99232 SBSQ HOSP IP/OBS MODERATE 35: CPT | Mod: GC,24

## 2019-12-30 RX ORDER — MEROPENEM 1 G/30ML
INJECTION INTRAVENOUS
Refills: 0 | Status: DISCONTINUED | OUTPATIENT
Start: 2019-12-30 | End: 2020-01-01

## 2019-12-30 RX ORDER — ACETAMINOPHEN 500 MG
650 TABLET ORAL ONCE
Refills: 0 | Status: COMPLETED | OUTPATIENT
Start: 2019-12-30 | End: 2019-12-30

## 2019-12-30 RX ORDER — MEROPENEM 1 G/30ML
500 INJECTION INTRAVENOUS ONCE
Refills: 0 | Status: COMPLETED | OUTPATIENT
Start: 2019-12-30 | End: 2019-12-30

## 2019-12-30 RX ORDER — MEROPENEM 1 G/30ML
500 INJECTION INTRAVENOUS EVERY 24 HOURS
Refills: 0 | Status: DISCONTINUED | OUTPATIENT
Start: 2019-12-31 | End: 2020-01-01

## 2019-12-30 RX ADMIN — MYCOPHENOLATE MOFETIL 1000 MILLIGRAM(S): 250 CAPSULE ORAL at 06:11

## 2019-12-30 RX ADMIN — Medication 10 MILLIGRAM(S): at 06:11

## 2019-12-30 RX ADMIN — Medication 12.5 MILLIGRAM(S): at 17:19

## 2019-12-30 RX ADMIN — BRIMONIDINE TARTRATE 1 DROP(S): 2 SOLUTION/ DROPS OPHTHALMIC at 13:01

## 2019-12-30 RX ADMIN — Medication 500000 UNIT(S): at 06:11

## 2019-12-30 RX ADMIN — BRIMONIDINE TARTRATE 1 DROP(S): 2 SOLUTION/ DROPS OPHTHALMIC at 06:10

## 2019-12-30 RX ADMIN — LATANOPROST 1 DROP(S): 0.05 SOLUTION/ DROPS OPHTHALMIC; TOPICAL at 13:01

## 2019-12-30 RX ADMIN — MEROPENEM 100 MILLIGRAM(S): 1 INJECTION INTRAVENOUS at 23:28

## 2019-12-30 RX ADMIN — Medication 100 MILLIGRAM(S): at 13:02

## 2019-12-30 RX ADMIN — BRIMONIDINE TARTRATE 1 DROP(S): 2 SOLUTION/ DROPS OPHTHALMIC at 21:12

## 2019-12-30 RX ADMIN — Medication 650 MILLIGRAM(S): at 01:30

## 2019-12-30 RX ADMIN — Medication 500000 UNIT(S): at 00:26

## 2019-12-30 RX ADMIN — VALGANCICLOVIR 450 MILLIGRAM(S): 450 TABLET, FILM COATED ORAL at 06:11

## 2019-12-30 RX ADMIN — FAMOTIDINE 20 MILLIGRAM(S): 10 INJECTION INTRAVENOUS at 13:01

## 2019-12-30 RX ADMIN — GABAPENTIN 100 MILLIGRAM(S): 400 CAPSULE ORAL at 06:11

## 2019-12-30 RX ADMIN — GABAPENTIN 100 MILLIGRAM(S): 400 CAPSULE ORAL at 17:20

## 2019-12-30 RX ADMIN — Medication 500000 UNIT(S): at 13:01

## 2019-12-30 RX ADMIN — MYCOPHENOLATE MOFETIL 1000 MILLIGRAM(S): 250 CAPSULE ORAL at 17:20

## 2019-12-30 RX ADMIN — Medication 1 TABLET(S): at 13:02

## 2019-12-30 RX ADMIN — Medication 500000 UNIT(S): at 23:18

## 2019-12-30 RX ADMIN — Medication 500000 UNIT(S): at 17:19

## 2019-12-30 RX ADMIN — Medication 12.5 MILLIGRAM(S): at 06:11

## 2019-12-30 RX ADMIN — Medication 650 MILLIGRAM(S): at 00:26

## 2019-12-30 NOTE — DIETITIAN INITIAL EVALUATION ADULT. - ETIOLOGY
Increased physiological demands for nutrients for recovery and HD Limited interest in learning and applying nutrition therapy education

## 2019-12-30 NOTE — DIETITIAN INITIAL EVALUATION ADULT. - DIET TYPE
Regular + no concentrated potassium + NPO after midnight (12/30) Recommend change to renal diet upon discharge. Recommend follow up visit with Transplant MD and outpatient RD for dietary modifications as warranted. Spoke to Transplant Team.

## 2019-12-30 NOTE — PROGRESS NOTE ADULT - SUBJECTIVE AND OBJECTIVE BOX
Follow Up:  Flu    Interval History: no chills or fevers. notes improved cough. no n/v/d     REVIEW OF SYSTEMS  [  ] ROS unobtainable because:    [ x ] All other systems negative except as noted below    Constitutional:  [ ] fever [ ] chills  [ ] weight loss  [ ] weakness  Skin:  [ ] rash [ ] phlebitis	  Eyes: [ ] icterus [ ] pain  [ ] discharge	  ENMT: [ ] sore throat  [ ] thrush [ ] ulcers [ ] exudates  Respiratory: [ ] dyspnea [ ] hemoptysis [x ] cough [ ] sputum	  Cardiovascular:  [ ] chest pain [ ] palpitations [ ] edema	  Gastrointestinal:  [ ] nausea [ ] vomiting [ ] diarrhea [ ] constipation [ ] pain	  Genitourinary:  [ ] dysuria [ ] frequency [ ] hematuria [ ] discharge [ ] flank pain  [ ] incontinence  Musculoskeletal:  [ ] myalgias [ ] arthralgias [ ] arthritis  [ ] back pain  Neurological:  [ ] headache [ ] seizures  [ ] confusion/altered mental status    Allergies  coconut (Anaphylaxis)  morphine (Pruritus; Rash)        ANTIMICROBIALS:  nystatin    Suspension [id number] four times a day  oseltamivir 30 <User Schedule>  trimethoprim   80 mG/sulfamethoxazole 400 mG 1 daily  valGANciclovir 450 <User Schedule>      OTHER MEDS:  MEDICATIONS  (STANDING):  allopurinol 100 daily  diphenhydrAMINE 25 once PRN  famotidine    Tablet 20 daily  gabapentin 100 two times a day  metoprolol tartrate 12.5 every 12 hours  mycophenolate mofetil 1000 two times a day  predniSONE   Tablet 10 daily  senna 2 at bedtime      Vital Signs Last 24 Hrs  T(C): 36.6 (30 Dec 2019 17:00), Max: 37.7 (30 Dec 2019 01:00)  T(F): 97.9 (30 Dec 2019 17:00), Max: 99.8 (30 Dec 2019 01:00)  HR: 70 (30 Dec 2019 17:00) (70 - 80)  BP: 110/66 (30 Dec 2019 17:00) (110/66 - 132/83)  BP(mean): --  RR: 18 (30 Dec 2019 17:00) (18 - 18)  SpO2: 99% (30 Dec 2019 17:00) (97% - 99%)    PHYSICAL EXAMINATION:  General: Alert and Awake, NAD  HEENT: PERRL, EOMI  Neck: Supple  Cardiac: RRR, No M/R/G  Resp: CTAB, No Wh/Rh/Ra  Abdomen: NBS, NT/ND, No HSM, No rigidity or guarding  MSK: No LE edema. No Calf tenderness  : No martinez  Skin: No rashes or lesions. Skin is warm and dry to the touch.   Neuro: Alert and Awake. CN 2-12 Grossly intact. Moves all four extremities spontaneously.  Psych: Calm, Pleasant, Cooperative                          8.6    10.06 )-----------( 434      ( 30 Dec 2019 06:34 )             25.8       12-30    132<L>  |  90<L>  |  30<H>  ----------------------------<  94  3.7   |  27  |  5.19<H>    Ca    8.9      30 Dec 2019 06:30  Phos  2.6       Mg     2.0         TPro  6.6  /  Alb  3.0<L>  /  TBili  1.0  /  DBili  0.5<H>  /  AST  22  /  ALT  11  /  AlkPhos  296<H>        Urinalysis Basic - ( 29 Dec 2019 03:40 )    Color: Dark Brown / Appearance: Turbid / S.021 / pH: x  Gluc: x / Ketone: Trace  / Bili: Small / Urobili: <2 mg/dL   Blood: x / Protein: 100 mg/dL / Nitrite: Negative   Leuk Esterase: Large / RBC: >720 /HPF / WBC >720 /HPF   Sq Epi: x / Non Sq Epi: 8 /HPF / Bacteria: Negative        MICROBIOLOGY:  v  .Urine Clean Catch (Midstream)  19   >100,000 CFU/ml Gram Negative Rods  --  --      .Blood Blood-Peripheral  19   No growth to date.  --  --      .Stool Feces  19   No enteric pathogens isolated.  (Stool culture examined for Salmonella,  Shigella, Campylobacter, Aeromonas, Plesiomonas,  Vibrio, E.coli O157 and Yersinia)  --  --      .Stool moriah johnie  19   GI PCR Results: NOT detected  *******Please Note:*******  GI panel PCR evaluates for:  Campylobacter, Plesiomonas shigelloides, Salmonella,  Vibrio, Yersinia enterocolitica, Enteroaggregative  Escherichia coli (EAEC), Enteropathogenic E.coli (EPEC),  Enterotoxigenic E. coli (ETEC) lt/st, Shiga-like  toxin-producing E. coli (STEC) stx1/stx2,  Shigella/ Enteroinvasive E. coli (EIEC), Cryptosporidium,  Cyclospora cayetanensis, Entamoeba histolytica,  Giardia lamblia, Adenovirus F 40/41, Astrovirus,  Norovirus GI/GII, Rotavirus A, Sapovirus  --  --      .Nose Nose  12-10-19   Few Methicillin Sensitive Staph aureus "This can represent normal nasal  carriage.  PCR is more sensitive for identifying MRSA/MSSA carriage"  --  --          Rapid RVP Result: Detected ( @ 08:36)    RADIOLOGY:    EXAM:  US KIDNEY TRANSPLANT W DOPP RT                        PROCEDURE DATE:  2019    Elevated velocities at the renal artery anastomosis, similar in appearance to 2019. Recommend continued follow up Doppler ultrasound.  High impedance waveforms throughout the transplant kidney, new since 2019, nonspecific. Recommend continued follow-up.  A 9.4 cm perinephric hematoma, previously documented as measuring 5.7 cm.  Urothelial thickening, nonspecific.    EXAM:  XR CHEST PORTABLE URGENT 1V                        PROCEDURE DATE:  2019    No acute process.

## 2019-12-30 NOTE — PROGRESS NOTE ADULT - ATTENDING COMMENTS
Pt with ATN of transplant - oliguric  still with edema  wound draining  Plan for return to OR tomorrow with washout of hematoma and kidney biopsy.    Dialysis first shift tomorrow.

## 2019-12-30 NOTE — PROGRESS NOTE ADULT - SUBJECTIVE AND OBJECTIVE BOX
Transplant Surgery - Multidisciplinary Rounds  --------------------------------------------------------------  R DDRT    Date:  12/8 - Dr. Espinal         Present:   Patient seen with multidisciplinary team including Transplant Surgeon: Dr. Espinal, Dr. Be, Dr. Cruz,  Transplant Nephrologist: Dr. SHARLENE Puckett. Nurse Practitioner: Aislinn Hunt and MAEVE Montano in am rounds and examined with Dr. Cruz    Disciplines not in attendance will be notified of the plan.       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 Young, followed by Dr. Raphael, [facility] Oncologist), RA with hand contractures. He underwent DDRT on 12/8/19 (ureteral stent sutured to Martinez - removed 12/15). Post op course c/b DGF requiring HD, thrombocytopenia, elevated LDH and Haptoglobulin. Schistocytes  on peripheral smear concerning for TMA. Envarsus held. Received Belatacept 12/13. Started on PLEX (12/12, 12/15). Underwent renal bx on 12/13 c/w profound ATN.  Found to have much improvement to levels, likely related to Tacrolimus. He was discharged home on 12/20/19 w/ outpatient HD.     He was now sent to ED by dialysis center after he was found to be febrile to 102, he did not get HD this AM, and last full HD session was on 12/26. Upon arrival, he was febrile to 101.7, other vitals were stable. He states that he had a cough that began 5-6 days ago that has now mostly resolved. He reports no other febrile episodes other than this morning. He admits to having one SOB episode earlier this week that resolved after he got HD. He also states that his mother was hospitalized earlier this week and was flu+. Currently makes less than 1 cup urine/daily. He currently denies SOB, CP, dyspnea, HA, dizziness, N/V, diarrhea, constipation.     Recipient info:   CPRA:    0%  ABO:      A+  CMVAb:  Positive  EBVIgG Status:  Positive  Last HD:  12/7/2019    Donor ID:  [id number]  Match: [id number]  OPO: NYRT  Age:  49  ABO:  A  KDPI 81%  COD:  Anoxia  X Clamp Time:  12/7/2019 1538.  Medical Hx: DM, HTN, HLD, obesity BMI 53, CAD, CABAGX3  Terminal Cr:  1/1.8/1.7  CMV- Neg EBV- Neg    OR:    DDRT to right iliac fossa. Simulect induction. Two arteries anastomosed to a single cuff on the back table. One vein, one ureter. Ureteral anastomosis performed over a double J stent, which was sutured to the martinez.   Cold ischemia time 25.5 hours.      Interval events:    - 12/27   + Flu A.   Received #2 of 3 planned Tamiflu doses post-HD (12/27, 12/29).     Denies cough, SOB or body aches.   - 12/27 blood Cx  NGTD  - Received 1unit PRBC for anemia during HD yesterday  - H/H 8.6/25.8 from 8.2/23.5  - Abdominal dressing dry without notable drainage this morning  - 12/27 renal u/s demonstrated increased perinephric hematoma compared to previous 9.4cm from 5.7. Elevated velocities at the renal artery anastomosis, similar in appearance to 12/18/2019. No Tardus parvus. Nonspecific High impedance waveforms throughout the transplant kidney, new since 12/18/2019.    Potential Discharge date: pending clinical improvement    Education:  Medications    Plan of care:  See Below    MEDICATIONS  (STANDING):  allopurinol 100 milliGRAM(s) Oral daily  brimonidine 0.2% Ophthalmic Solution 1 Drop(s) Both EYES three times a day  famotidine    Tablet 20 milliGRAM(s) Oral daily  gabapentin 100 milliGRAM(s) Oral two times a day  latanoprost 0.005% Ophthalmic Solution 1 Drop(s) Both EYES daily  metoprolol tartrate 12.5 milliGRAM(s) Oral every 12 hours  mycophenolate mofetil 1000 milliGRAM(s) Oral two times a day  nystatin    Suspension [id number] Unit(s) Oral four times a day  oseltamivir 30 milliGRAM(s) Oral <User Schedule>  predniSONE   Tablet 10 milliGRAM(s) Oral daily  senna 2 Tablet(s) Oral at bedtime  trimethoprim   80 mG/sulfamethoxazole 400 mG 1 Tablet(s) Oral daily  valGANciclovir 450 milliGRAM(s) Oral <User Schedule>    MEDICATIONS  (PRN):  diphenhydrAMINE 25 milliGRAM(s) Oral once PRN Rash and/or Itching      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  Carpal tunnel syndrome  Brain cyst: had MRI recently- now gone  Anemia  Gastric ulcer: Long time ago  Rheumatoid arthritis  Renal transplant recipient  Breakdown (mechanical) of penile (implanted) prosthesis, sequela  End-stage renal disease (ESRD): PERMACATH PLACEMENT  Abscess of left buttock: s/p I &amp; D  AV fistula: placement left lower arm  S/P cystoscopy: stent placement &amp; removal for kidney stones, lithiotripsy  s/p Lt Carpal tunnel: 2011      Vital Signs Last 24 Hrs  T(C): 37.4 (30 Dec 2019 08:53), Max: 37.7 (30 Dec 2019 01:00)  T(F): 99.4 (30 Dec 2019 08:53), Max: 99.8 (30 Dec 2019 01:00)  HR: 73 (30 Dec 2019 08:53) (64 - 80)  BP: 117/70 (30 Dec 2019 08:53) (114/65 - 139/76)  BP(mean): --  RR: 18 (30 Dec 2019 08:53) (16 - 18)  SpO2: 98% (30 Dec 2019 08:53) (97% - 99%)    I&O's Summary    29 Dec 2019 07:01  -  30 Dec 2019 07:00  --------------------------------------------------------  IN: 2280 mL / OUT: 3400 mL / NET: -1120 mL                            8.6    10.06 )-----------( 434      ( 30 Dec 2019 06:34 )             25.8     12-30    132<L>  |  90<L>  |  30<H>  ----------------------------<  94  3.7   |  27  |  5.19<H>    Ca    8.9      30 Dec 2019 06:30  Phos  2.6     12-30  Mg     2.0     12-30    TPro  6.6  /  Alb  3.0<L>  /  TBili  1.0  /  DBili  0.5<H>  /  AST  22  /  ALT  11  /  AlkPhos  296<H>  12-30        Culture - Blood (collected 12-27-19 @ 13:12)  Source: .Blood Blood-Peripheral  Preliminary Report (12-28-19 @ 14:01):    No growth to date.    Culture - Blood (collected 12-27-19 @ 13:12)  Source: .Blood Blood-Peripheral  Preliminary Report (12-28-19 @ 14:01):    No growth to date.        Review of systems   Gen: No weight changes, fatigue, weakness.   	Skin: No rashes  	Head/Eyes/Ears/Mouth: No headache; Normal hearing; Normal vision w/o blurriness; No sinus pain/discomfort, admits to having a sore throat and cough earlier this week that has now significantly improved.   	Respiratory: Pos occasional cough No dyspnea, wheezing, hemoptysis  	CV: No chest pain, PND, orthopnea  	GI: No abdominal pain, diarrhea, constipation, nausea, vomiting, melena, hematochezia  	: No increased frequency, dysuria, hematuria, nocturia  	MSK: No joint pain/swelling; no back pain; admits to LE edema that he states has improved   	Neuro: No dizziness/lightheadedness, weakness, seizures, numbness, tingling  	Heme: No easy bruising or bleeding  	Endo: No heat/cold intolerance  	Psych: No significant nervousness, anxiety, stress, depression  All other systems were reviewed and are negative, except as noted.      PHYSICAL EXAM:  Constitutional: Well developed / well nourished  Eyes: Anicteric, PERRLA  ENMT: nc/at  Neck: Supple  Respiratory: CTA b/l    Cardiovascular: RRR  Gastrointestinal: Soft abdomen, NT, ND.   Genitourinary:  Voiding spontaneously  Extremities: SCD's in place and working bilaterally  Vascular: faintly palpable pulses b/l LE; 2+ pitting edema.   Neurological: A&O x3  Skin: Wound not healed; some staples still present. No drainage expressed from wound this morning.    Musculoskeletal: Moving all extremities Psychiatric: Responsive

## 2019-12-30 NOTE — PROGRESS NOTE ADULT - PROBLEM SELECTOR PLAN 1
s/p DDRT 12/8/19 c/b Delayed graft function in setting ATN/focal TMA requiring HD, PLEX 12/12 and plasmapheresis 12/14    - continue HD per primary nephrologist  - continue immunosuppression as stated below  - trend SCr, electrolytes, strict I/O, daily standing weight, renal diet/low K  - Avoiding nephrotoxic agents-NSAIDs, contrast, nephrotoxic antibiotics, ACEI/ARB.

## 2019-12-30 NOTE — PROGRESS NOTE ADULT - SUBJECTIVE AND OBJECTIVE BOX
[facility] DIVISION OF KIDNEY DISEASES AND HYPERTENSION -- FOLLOW UP NOTE  --------------------------------------------------------------------------------  Chief Complaint:    24 hour events/subjective:      PAST HISTORY  --------------------------------------------------------------------------------  No significant changes to PMH, PSH, FHx, SHx, unless otherwise noted    ALLERGIES & MEDICATIONS  --------------------------------------------------------------------------------  Allergies    coconut (Anaphylaxis)  morphine (Pruritus; Rash)    Intolerances    Standing Inpatient Medications  allopurinol 100 milliGRAM(s) Oral daily  brimonidine 0.2% Ophthalmic Solution 1 Drop(s) Both EYES three times a day  famotidine    Tablet 20 milliGRAM(s) Oral daily  gabapentin 100 milliGRAM(s) Oral two times a day  latanoprost 0.005% Ophthalmic Solution 1 Drop(s) Both EYES daily  metoprolol tartrate 12.5 milliGRAM(s) Oral every 12 hours  mycophenolate mofetil 1000 milliGRAM(s) Oral two times a day  nystatin    Suspension 596748 Unit(s) Oral four times a day  oseltamivir 30 milliGRAM(s) Oral <User Schedule>  predniSONE   Tablet 10 milliGRAM(s) Oral daily  senna 2 Tablet(s) Oral at bedtime  trimethoprim   80 mG/sulfamethoxazole 400 mG 1 Tablet(s) Oral daily  valGANciclovir 450 milliGRAM(s) Oral <User Schedule>    PRN Inpatient Medications  diphenhydrAMINE 25 milliGRAM(s) Oral once PRN      REVIEW OF SYSTEMS  --------------------------------------------------------------------------------  Gen: No weight changes, fatigue, fevers/chills, weakness  Skin: No rashes  Head/Eyes/Ears/Mouth: No headache; Normal hearing; Normal vision w/o blurriness; No sinus pain/discomfort, sore throat  Respiratory: No dyspnea, cough, wheezing, hemoptysis  CV: No chest pain, PND, orthopnea  GI: No abdominal pain, diarrhea, constipation, nausea, vomiting, melena, hematochezia  : No increased frequency, dysuria, hematuria, nocturia  MSK: No joint pain/swelling; no back pain; no edema  Neuro: No dizziness/lightheadedness, weakness, seizures, numbness, tingling  Heme: No easy bruising or bleeding  Endo: No heat/cold intolerance  Psych: No significant nervousness, anxiety, stress, depression    All other systems were reviewed and are negative, except as noted.    VITALS/PHYSICAL EXAM  --------------------------------------------------------------------------------  T(C): 37.4 (12-30-19 @ 08:53), Max: 37.7 (12-30-19 @ 01:00)  HR: 73 (12-30-19 @ 08:53) (64 - 80)  BP: 117/70 (12-30-19 @ 08:53) (114/65 - 139/76)  RR: 18 (12-30-19 @ 08:53) (16 - 18)  SpO2: 98% (12-30-19 @ 08:53) (97% - 99%)  Wt(kg): --        12-29-19 @ 07:01  -  12-30-19 @ 07:00  --------------------------------------------------------  IN: 2280 mL / OUT: 3400 mL / NET: -1120 mL      Physical Exam:  	Gen: NAD, well-appearing  	HEENT: PERRL, supple neck, clear oropharynx  	Pulm: CTA B/L  	CV: RRR, S1S2; no rub  	Back: No spinal or CVA tenderness; no sacral edema  	Abd: +BS, soft, nontender/nondistended  	: No suprapubic tenderness  	UE: Warm, FROM, no clubbing, intact strength; no edema; no asterixis  	LE: Warm, FROM, no clubbing, intact strength; no edema  	Neuro: No focal deficits, intact gait  	Psych: Normal affect and mood  	Skin: Warm, without rashes  	Vascular access:    LABS/STUDIES  --------------------------------------------------------------------------------              8.6    10.06 >-----------<  434      [12-30-19 @ 06:34]              25.8     132  |  90  |  30  ----------------------------<  94      [12-30-19 @ 06:30]  3.7   |  27  |  5.19        Ca     8.9     [12-30-19 @ 06:30]      Mg     2.0     [12-30-19 @ 06:30]      Phos  2.6     [12-30-19 @ 06:30]    TPro  6.6  /  Alb  3.0  /  TBili  1.0  /  DBili  0.5  /  AST  22  /  ALT  11  /  AlkPhos  296  [12-30-19 @ 06:30]      Creatinine Trend:  SCr 5.19 [12-30 @ 06:30]  SCr 7.36 [12-29 @ 06:36]  SCr 5.72 [12-28 @ 07:02]  SCr 7.79 [12-27 @ 14:02]  SCr 7.64 [12-27 @ 08:36]    Urinalysis - [12-29-19 @ 03:40]      Color Dark Brown / Appearance Turbid / SG 1.021 / pH 7.5      Gluc Negative / Ketone Trace  / Bili Small / Urobili <2 mg/dL       Blood Large / Protein 100 mg/dL / Leuk Est Large / Nitrite Negative      RBC >720 / WBC >720 / Hyaline 5 / Gran  / Sq Epi  / Non Sq Epi 8 / Bacteria Negative      HbA1c 5.0      [12-11-19 @ 08:21]    HBsAg Nonreact      [12-12-19 @ 22:37]  HCV 0.15, Nonreact      [12-12-19 @ 22:37] [facility] DIVISION OF KIDNEY DISEASES AND HYPERTENSION -- FOLLOW UP NOTE  --------------------------------------------------------------------------------  Chief Complaint:    24 hour events/subjective:  - over weekend continue tamiflu, 1U prbc (12/29 for anemia), BCx NTD, HD 12/29  - overnight no events reported, vitals wnl afebrile, total UOP voided 200 cc, HD 2L removed  - patient seen and examined at bedside this morning who endorse dysuria  - vitals/lab/medications reviewed, noted for Hgb 8.2 to 8.6, Na 132, K 3.7    PAST HISTORY  --------------------------------------------------------------------------------  No significant changes to PMH, PSH, FHx, SHx, unless otherwise noted    ALLERGIES & MEDICATIONS  --------------------------------------------------------------------------------  Allergies    coconut (Anaphylaxis)  morphine (Pruritus; Rash)    Intolerances    Standing Inpatient Medications  allopurinol 100 milliGRAM(s) Oral daily  brimonidine 0.2% Ophthalmic Solution 1 Drop(s) Both EYES three times a day  famotidine    Tablet 20 milliGRAM(s) Oral daily  gabapentin 100 milliGRAM(s) Oral two times a day  latanoprost 0.005% Ophthalmic Solution 1 Drop(s) Both EYES daily  metoprolol tartrate 12.5 milliGRAM(s) Oral every 12 hours  mycophenolate mofetil 1000 milliGRAM(s) Oral two times a day  nystatin    Suspension [id number] Unit(s) Oral four times a day  oseltamivir 30 milliGRAM(s) Oral <User Schedule>  predniSONE   Tablet 10 milliGRAM(s) Oral daily  senna 2 Tablet(s) Oral at bedtime  trimethoprim   80 mG/sulfamethoxazole 400 mG 1 Tablet(s) Oral daily  valGANciclovir 450 milliGRAM(s) Oral <User Schedule>    PRN Inpatient Medications  diphenhydrAMINE 25 milliGRAM(s) Oral once PRN      REVIEW OF SYSTEMS  Gen: No  fatigue, chills, weakness  Skin: No rashes  Respiratory: + productive cough.  No dyspnea, wheezing, hemoptysis  CV: No chest pain, PND, orthopnea  GI: No abdominal pain, diarrhea, constipation, nausea, vomiting, melena  : No increased frequency, dysuria, hematuria  MSK: + edema  Neuro: No dizziness/lightheadedness, weakness    VITALS/PHYSICAL EXAM  --------------------------------------------------------------------------------  T(C): 37.4 (12-30-19 @ 08:53), Max: 37.7 (12-30-19 @ 01:00)  HR: 73 (12-30-19 @ 08:53) (64 - 80)  BP: 117/70 (12-30-19 @ 08:53) (114/65 - 139/76)  RR: 18 (12-30-19 @ 08:53) (16 - 18)  SpO2: 98% (12-30-19 @ 08:53) (97% - 99%)  Wt(kg): --        12-29-19 @ 07:01  -  12-30-19 @ 07:00  --------------------------------------------------------  IN: 2280 mL / OUT: 3400 mL / NET: -1120 mL      Physical Exam:  	Gen: NAD, well-appearing on room air  	HEENT: MMM, supple neck, no cervical LE  	Pulm: bibasilar crackles  	CV: RRR, S1S2; no murmur  	Abd: +BS, soft, nontender/nondistended, RLQ surgical wound  	: No suprapubic tenderness  	LE: Warm, edema R>L, LUE AVF+  	Skin: Warm, without rashes  	Vascular access:    LABS/STUDIES  --------------------------------------------------------------------------------              8.6    10.06 >-----------<  434      [12-30-19 @ 06:34]              25.8     132  |  90  |  30  ----------------------------<  94      [12-30-19 @ 06:30]  3.7   |  27  |  5.19        Ca     8.9     [12-30-19 @ 06:30]      Mg     2.0     [12-30-19 @ 06:30]      Phos  2.6     [12-30-19 @ 06:30]    TPro  6.6  /  Alb  3.0  /  TBili  1.0  /  DBili  0.5  /  AST  22  /  ALT  11  /  AlkPhos  296  [12-30-19 @ 06:30]      Creatinine Trend:  SCr 5.19 [12-30 @ 06:30]  SCr 7.36 [12-29 @ 06:36]  SCr 5.72 [12-28 @ 07:02]  SCr 7.79 [12-27 @ 14:02]  SCr 7.64 [12-27 @ 08:36]    Urinalysis - [12-29-19 @ 03:40]      Color Dark Brown / Appearance Turbid / SG 1.021 / pH 7.5      Gluc Negative / Ketone Trace  / Bili Small / Urobili <2 mg/dL       Blood Large / Protein 100 mg/dL / Leuk Est Large / Nitrite Negative      RBC >720 / WBC >720 / Hyaline 5 / Gran  / Sq Epi  / Non Sq Epi 8 / Bacteria Negative      HbA1c 5.0      [12-11-19 @ 08:21]    HBsAg Nonreact      [12-12-19 @ 22:37]  HCV 0.15, Nonreact      [12-12-19 @ 22:37]

## 2019-12-30 NOTE — PROGRESS NOTE ADULT - PROBLEM SELECTOR PLAN 5
Hematoma in setting of biopsy and plasma exchange.  s/p prbc transfusion 12/29    - Plan for OR tomorrow 12/31 for washout and repeat renal graft biopsy.   - monitor CBC (transfuse as needed)

## 2019-12-30 NOTE — PROGRESS NOTE ADULT - PROBLEM SELECTOR PLAN 3
BP in acceptable range.  - continue metoprolol 25 BID and hydralazine 50 BID (hold prior HD)  - monitor vitals BP.

## 2019-12-30 NOTE — DIETITIAN INITIAL EVALUATION ADULT. - REASON INDICATOR FOR ASSESSMENT
Pt seen for post kidney transplant recipient (12/08) nutrition evaluation per department protocol.   Information obtained from: medical record, previous RD note, and pt.

## 2019-12-30 NOTE — PROGRESS NOTE ADULT - ASSESSMENT
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 Young, followed by Dr. Raphael, [facility] Oncologist), RA with hand contractures. 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 Post op course c/b DGF requiring HD, thrombocytopenia, elevated LDH and Haptoglobulin. Schistocytes  on peripheral smear concerning for TMA. Envarsus held. Received Belatacept 12/13. Started on PLEX (12/12, 12/15). Underwent renal bx on 12/13 c/w profound ATN.  Found to have much improvement to levels, likely related to Tacrolimus. He was discharged home on 12/20/19 w/ outpatient HD; today sent to ED after being found to be febrile to 102 at dialysis center found to have Flu A.     Influenza  - RVP + influenza A  - Tamiflu 30 mg PO after HD x 3 doses (12/27, 12/29). Last dose due 12/31 post-HD     s/p DDRT   - Increase in size of carlos-nephric hematoma noted on ultrasound 12/27 compared to previous.  - No notable improvement in renal graft function following treatment for TMA and discontinuance of CNI.   - Renal bx  12/13 with profound ATN  - Plan for OR tomorrow for washout and repeat renal graft biopsy. Will request early HD tomorrow prior to OR. Discussed with nephrology  - Continue HD TIW for delayed graft function. Tu/Th/Sat schedule  - TMA:  S/P PLEX x2 and CNI changed to Belatacept   -  Immunosuppression: Belatacept (given 12/28), MMF 1 gm BID, Pred 10  - PPX: Nystatin, bactrim QD, Valcyte renally dosed TIW  -Strict I/Os  -regular diet, low K.  NPO after MN for OR  -SCDs     HTN  -Well controlled at present  - Continue Metoprolol 12.5 BID   - hold  hydralazine

## 2019-12-30 NOTE — PROGRESS NOTE ADULT - PROBLEM SELECTOR PLAN 2
s/p belatacept 12/28    - continue  BID, Prednisone 10mg daily  - continue ppx: Nystatin, bactrim, Valcyte renally dosed BIW, pepcid  - monitor CBC.

## 2019-12-30 NOTE — PROGRESS NOTE ADULT - ASSESSMENT
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 In the ED febrile to 101.7, tachycardic > 90 and normotensive. WBC on presentation of 14.87 with 81.9% PMN.     RVP with Influenza A.   CXR Clear    At this point appears patient has Influenza infection without superimposed pneumonia.  Would watch patient's clinical course  If worsening fever curve or respiratory status would check CT Chest and add Linezolid/Ceftriaxone    Overall, Influenza Infection, Leukocytosis, Fever, Renal Transplant Recipient    --Continue Tamiflu 30 mg PO post-HD (no doses on non-HD days). Needs one more dose  --If worsening fever curve or respiratory status would check CT Chest and add Linezolid/Ceftriaxone  --Continue to follow CBC with diff  --Continue to follow temperature curve  --Follow up on preliminary blood cultures    I will sign off at this time. Please feel free to contact me with any further questions or concerns.    Dada Jacobo M.D.  [facility] Division of Infectious Disease  8AM-5PM: Pager Number [phone]  After Hours (or if no response): Please contact the Infectious Diseases Office at [phone]

## 2019-12-30 NOTE — DIETITIAN INITIAL EVALUATION ADULT. - SIGNS/SYMPTOMS
Pt S/P kidney transplant recipient (12/08) on HD Pt not following any type of diet; unable to recall food safety guidelines

## 2019-12-30 NOTE — DIETITIAN INITIAL EVALUATION ADULT. - PHYSICAL APPEARANCE
No visual signs of muscle/fat loss noted/well nourished/other (specify) Ht: 71 inches UBW: 139.7 pounds BMI: 19.5 kg/m2 IBW: 172 (+/-10%) 81.2 %IBW  Noted +3 jolynn. ankle and foot edema as per flow sheets.   Skin: no noted pressure injuries as per documentation.

## 2019-12-30 NOTE — DIETITIAN INITIAL EVALUATION ADULT. - ENERGY NEEDS
Pertinent information as per chart: Pt 50 y/o M with PMH: ESRD on HD, HTN, Gout, Glaucoma, NET of pancreas (S/P robotic distal panc/splenectomy at [place] (2015), RA with hand contractures, S/P DDRT on (12/08/2019), complicated with DGF requiring HD secondary to ATN, thrombocytopenia, elevated LDH and Haptoglobulin, discharged home on (12/20/2019) with outpatient HD, present with fever, found with Influenza A; last HD yesterday (12/29).

## 2019-12-30 NOTE — DIETITIAN INITIAL EVALUATION ADULT. - ADD RECOMMEND
1. Will continue to monitor PO intake, weight, labs, skin, GI status, diet, urine output, needs for further education. 2. Reviewed education on post transplant and HD nutrition therapy and food safety guidelines for transplant recipients before discharge - pt made aware RD remains available.

## 2019-12-30 NOTE — DIETITIAN INITIAL EVALUATION ADULT. - OTHER INFO
Pt reports good appetite and PO intake at home. Reports allergy to coconut (states causes swelling); denies having any other food allergies or intolerances. Pt reports not taking any vitamins or nutritional supplements PTA. Pt reports not following any type of diet or restriction at home; admits to add salt to foods and to consume out - states ordering "medium-well" steak few days ago; however reports washing fruits and vegetables, not eating sushi, and cooking food to the proper temperature.     Pt reports good appetite and PO intake. Noted 100% PO intake as per lunch tray at bedside. Denies difficulty chewing/swallowing. Pt denies nausea, vomiting, diarrhea, or constipation, last BM today (12/30). Urine output as per flow sheets (12/28) 70 ml -> (12/29) 200 ml -> (12/30) 0 ml.     Reviewed education on post transplant and HD nutrition therapy and food safety guidelines for transplant recipients. Discussed importance of thoroughly washing all fresh fruits/vegetables, importance of avoiding uncooked/raw/unpasteurized foods, avoiding pre-made deli/buffet/salad bar meals. Foods recommended as healthy well balanced diet and importance of adequate protein intakes for proper post-surgical healing discussed. Reviewed recommendations for moderate intake of sodium and carbohydrates with transplant medications. Reviewed effect of steroids on BG levels and importance of limiting concentrated sweets. Discussed increased demand for kcal and protein intake while on HD, reviewed foods high in potassium, phosphorus, and sodium, discussed foods recommended within therapeutic diet and protein portion sizing. All questions answered. Pt reports having handouts at home - made aware RD remains available. Pt reports good appetite and PO intake at home. Reports allergy to coconut (states causes swelling); denies having any other food allergies or intolerances. Pt reports not taking any vitamins or nutritional supplements PTA. Pt reports not following any type of diet or restriction at home; admits to add salt to foods and to consume out - states ordering "medium-well" steak few days ago; however reports washing fruits and vegetables, not eating sushi, and cooking food to the "proper temperature".     Pt reports good appetite and PO intake. Noted 100% PO intake as per lunch tray at bedside. Denies difficulty chewing/swallowing. Pt denies nausea, vomiting, diarrhea, or constipation, last BM today (12/30). Urine output as per flow sheets (12/28) 70 ml -> (12/29) 200 ml -> (12/30) 0 ml.     Reviewed education on post transplant and HD nutrition therapy and food safety guidelines for transplant recipients. Discussed importance of thoroughly washing all fresh fruits/vegetables, importance of avoiding uncooked/raw/unpasteurized foods, avoiding pre-made deli/buffet/salad bar meals. Foods recommended as healthy well balanced diet and importance of adequate protein intakes for proper post-surgical healing discussed. Reviewed recommendations for moderate intake of sodium and carbohydrates with transplant medications. Reviewed effect of steroids on BG levels and importance of limiting concentrated sweets. Discussed increased demand for kcal and protein intake while on HD, reviewed foods high in potassium, phosphorus, and sodium, discussed foods recommended within therapeutic diet and protein portion sizing. All questions answered. Pt reports having handouts at home - made aware RD remains available.

## 2019-12-30 NOTE — PROGRESS NOTE ADULT - SUBJECTIVE AND OBJECTIVE BOX
Resting, UO poor, fever resolved    Vital Signs Last 24 Hrs  T(C): 36.8 (19 @ 12:34), Max: 37.7 (19 @ 01:00)  T(F): 98.2 (19 @ 12:34), Max: 99.8 (19 @ 01:00)  HR: 72 (19 @ 12:34) (64 - 80)  BP: 117/67 (19 @ 12:34) (116/71 - 139/76)  RR: 18 (19 @ 12:34) (16 - 18)  SpO2: 99% (19 @ 12:34) (97% - 99%)    Card S1S2  Lungs b/l air entry  Abd soft, ND  Extr + edema, AVF patent                                         8.6    10.06 )-----------( 434      ( 30 Dec 2019 06:34 )             25.8     30 Dec 2019 06:30    132    |  90     |  30     ----------------------------<  94     3.7     |  27     |  5.19     Ca    8.9        30 Dec 2019 06:30  Phos  2.6       30 Dec 2019 06:30  Mg     2.0       30 Dec 2019 06:30    TPro  6.6    /  Alb  3.0    /  TBili  1.0    /  DBili  0.5    /  AST  22     /  ALT  11     /  AlkPhos  296    30 Dec 2019 06:30    LIVER FUNCTIONS - ( 30 Dec 2019 06:30 )  Alb: 3.0 g/dL / Pro: 6.6 g/dL / ALK PHOS: 296 U/L / ALT: 11 U/L / AST: 22 U/L / GGT: x           Urinalysis Basic - ( 29 Dec 2019 03:40 )    Color: Dark Brown / Appearance: Turbid / S.021 / pH: x  Gluc: x / Ketone: Trace  / Bili: Small / Urobili: <2 mg/dL   Blood: x / Protein: 100 mg/dL / Nitrite: Negative   Leuk Esterase: Large / RBC: >720 /HPF / WBC >720 /HPF   Sq Epi: x / Non Sq Epi: 8 /HPF / Bacteria: Negative    allopurinol 100 milliGRAM(s) Oral daily  brimonidine 0.2% Ophthalmic Solution 1 Drop(s) Both EYES three times a day  diphenhydrAMINE 25 milliGRAM(s) Oral once PRN  famotidine    Tablet 20 milliGRAM(s) Oral daily  gabapentin 100 milliGRAM(s) Oral two times a day  latanoprost 0.005% Ophthalmic Solution 1 Drop(s) Both EYES daily  metoprolol tartrate 12.5 milliGRAM(s) Oral every 12 hours  mycophenolate mofetil 1000 milliGRAM(s) Oral two times a day  nystatin    Suspension [id number] Unit(s) Oral four times a day  oseltamivir 30 milliGRAM(s) Oral <User Schedule>  predniSONE   Tablet 10 milliGRAM(s) Oral daily  senna 2 Tablet(s) Oral at bedtime  trimethoprim   80 mG/sulfamethoxazole 400 mG 1 Tablet(s) Oral daily  valGANciclovir 450 milliGRAM(s) Oral <User Schedule>    A/P:    Flu A  ESRD on HD  S/p DDRT 2019  DGF  S/p transplant kidney bx : ATN, focal TMA  S/p PLEX  and 12/15  Araceli-nephric hematoma noted on ultrasound , increased from prior  Per transplant team plan is for OR tomorrow for washout and repeat renal graft biopsy  HD tomorrow am  Epogen w/HD  Heparin free  TMP as able avoiding hypotension during HD  D/w transplant team

## 2019-12-30 NOTE — DIETITIAN INITIAL EVALUATION ADULT. - NS FNS WEIGHT CHANGE REASON
Pt reports 20 pounds weight gain PTA due to fluid accumulation, unable to recall times frames, from dry .7 pounds to 159 pounds. Weight as per previous RD note (12/10/2019) 141.3 pounds with edema. Weight as per flow sheets (12/27) 159.1 pounds -> (12/30) 153.6 pounds -?accuracy of weight fluctuations likely due to fluid shifts as pt with edema and S/P DDRT (12/08) on HD, will continue to monitor.

## 2019-12-31 ENCOUNTER — TRANSCRIPTION ENCOUNTER (OUTPATIENT)
Age: 49
End: 2019-12-31

## 2019-12-31 DIAGNOSIS — N39.0 URINARY TRACT INFECTION, SITE NOT SPECIFIED: ICD-10-CM

## 2019-12-31 LAB
-  AMIKACIN: SIGNIFICANT CHANGE UP
-  AMPICILLIN/SULBACTAM: SIGNIFICANT CHANGE UP
-  AMPICILLIN: SIGNIFICANT CHANGE UP
-  AZTREONAM: SIGNIFICANT CHANGE UP
-  CEFAZOLIN: SIGNIFICANT CHANGE UP
-  CEFEPIME: SIGNIFICANT CHANGE UP
-  CEFOXITIN: SIGNIFICANT CHANGE UP
-  CEFTRIAXONE: SIGNIFICANT CHANGE UP
-  CIPROFLOXACIN: SIGNIFICANT CHANGE UP
-  ERTAPENEM: SIGNIFICANT CHANGE UP
-  GENTAMICIN: SIGNIFICANT CHANGE UP
-  IMIPENEM: SIGNIFICANT CHANGE UP
-  LEVOFLOXACIN: SIGNIFICANT CHANGE UP
-  MEROPENEM: SIGNIFICANT CHANGE UP
-  NITROFURANTOIN: SIGNIFICANT CHANGE UP
-  PIPERACILLIN/TAZOBACTAM: SIGNIFICANT CHANGE UP
-  TIGECYCLINE: SIGNIFICANT CHANGE UP
-  TOBRAMYCIN: SIGNIFICANT CHANGE UP
-  TRIMETHOPRIM/SULFAMETHOXAZOLE: SIGNIFICANT CHANGE UP
ALBUMIN SERPL ELPH-MCNC: 3 G/DL — LOW (ref 3.3–5)
ALP SERPL-CCNC: 301 U/L — HIGH (ref 40–120)
ALT FLD-CCNC: 12 U/L — SIGNIFICANT CHANGE UP (ref 10–45)
ANION GAP SERPL CALC-SCNC: 17 MMOL/L — SIGNIFICANT CHANGE UP (ref 5–17)
APTT BLD: 29.8 SEC — SIGNIFICANT CHANGE UP (ref 27.5–36.3)
AST SERPL-CCNC: 21 U/L — SIGNIFICANT CHANGE UP (ref 10–40)
BASOPHILS # BLD AUTO: 0 K/UL — SIGNIFICANT CHANGE UP (ref 0–0.2)
BASOPHILS NFR BLD AUTO: 0 % — SIGNIFICANT CHANGE UP (ref 0–2)
BILIRUB DIRECT SERPL-MCNC: 0.5 MG/DL — HIGH (ref 0–0.2)
BILIRUB INDIRECT FLD-MCNC: 0.4 MG/DL — SIGNIFICANT CHANGE UP (ref 0.2–1)
BILIRUB SERPL-MCNC: 0.9 MG/DL — SIGNIFICANT CHANGE UP (ref 0.2–1.2)
BLD GP AB SCN SERPL QL: NEGATIVE — SIGNIFICANT CHANGE UP
BUN SERPL-MCNC: 46 MG/DL — HIGH (ref 7–23)
CALCIUM SERPL-MCNC: 8.8 MG/DL — SIGNIFICANT CHANGE UP (ref 8.4–10.5)
CHLORIDE SERPL-SCNC: 92 MMOL/L — LOW (ref 96–108)
CO2 SERPL-SCNC: 25 MMOL/L — SIGNIFICANT CHANGE UP (ref 22–31)
CREAT SERPL-MCNC: 6.85 MG/DL — HIGH (ref 0.5–1.3)
CULTURE RESULTS: SIGNIFICANT CHANGE UP
EOSINOPHIL # BLD AUTO: 0.6 K/UL — HIGH (ref 0–0.5)
EOSINOPHIL NFR BLD AUTO: 6.1 % — HIGH (ref 0–6)
GLUCOSE SERPL-MCNC: 90 MG/DL — SIGNIFICANT CHANGE UP (ref 70–99)
HCT VFR BLD CALC: 27.5 % — LOW (ref 39–50)
HGB BLD-MCNC: 8.9 G/DL — LOW (ref 13–17)
INR BLD: 1.15 RATIO — SIGNIFICANT CHANGE UP (ref 0.88–1.16)
LYMPHOCYTES # BLD AUTO: 1.62 K/UL — SIGNIFICANT CHANGE UP (ref 1–3.3)
LYMPHOCYTES # BLD AUTO: 16.5 % — SIGNIFICANT CHANGE UP (ref 13–44)
MAGNESIUM SERPL-MCNC: 2.1 MG/DL — SIGNIFICANT CHANGE UP (ref 1.6–2.6)
MCHC RBC-ENTMCNC: 30.3 PG — SIGNIFICANT CHANGE UP (ref 27–34)
MCHC RBC-ENTMCNC: 32.4 GM/DL — SIGNIFICANT CHANGE UP (ref 32–36)
MCV RBC AUTO: 93.5 FL — SIGNIFICANT CHANGE UP (ref 80–100)
METHOD TYPE: SIGNIFICANT CHANGE UP
MONOCYTES # BLD AUTO: 0.93 K/UL — HIGH (ref 0–0.9)
MONOCYTES NFR BLD AUTO: 9.5 % — SIGNIFICANT CHANGE UP (ref 2–14)
NEUTROPHILS # BLD AUTO: 6.5 K/UL — SIGNIFICANT CHANGE UP (ref 1.8–7.4)
NEUTROPHILS NFR BLD AUTO: 66.1 % — SIGNIFICANT CHANGE UP (ref 43–77)
ORGANISM # SPEC MICROSCOPIC CNT: SIGNIFICANT CHANGE UP
ORGANISM # SPEC MICROSCOPIC CNT: SIGNIFICANT CHANGE UP
PHOSPHATE SERPL-MCNC: 3.1 MG/DL — SIGNIFICANT CHANGE UP (ref 2.5–4.5)
PLATELET # BLD AUTO: 486 K/UL — HIGH (ref 150–400)
POTASSIUM SERPL-MCNC: 4 MMOL/L — SIGNIFICANT CHANGE UP (ref 3.5–5.3)
POTASSIUM SERPL-SCNC: 4 MMOL/L — SIGNIFICANT CHANGE UP (ref 3.5–5.3)
PROT SERPL-MCNC: 7 G/DL — SIGNIFICANT CHANGE UP (ref 6–8.3)
PROTHROM AB SERPL-ACNC: 13.3 SEC — HIGH (ref 10–12.9)
RBC # BLD: 2.94 M/UL — LOW (ref 4.2–5.8)
RBC # FLD: 18 % — HIGH (ref 10.3–14.5)
RH IG SCN BLD-IMP: POSITIVE — SIGNIFICANT CHANGE UP
SODIUM SERPL-SCNC: 134 MMOL/L — LOW (ref 135–145)
SPECIMEN SOURCE: SIGNIFICANT CHANGE UP
WBC # BLD: 9.83 K/UL — SIGNIFICANT CHANGE UP (ref 3.8–10.5)
WBC # FLD AUTO: 9.83 K/UL — SIGNIFICANT CHANGE UP (ref 3.8–10.5)

## 2019-12-31 PROCEDURE — 99232 SBSQ HOSP IP/OBS MODERATE 35: CPT | Mod: GC

## 2019-12-31 PROCEDURE — 99232 SBSQ HOSP IP/OBS MODERATE 35: CPT

## 2019-12-31 PROCEDURE — 76776 US EXAM K TRANSPL W/DOPPLER: CPT | Mod: 26,RT

## 2019-12-31 PROCEDURE — 99232 SBSQ HOSP IP/OBS MODERATE 35: CPT | Mod: GC,24

## 2019-12-31 RX ORDER — HYDROMORPHONE HYDROCHLORIDE 2 MG/ML
0.25 INJECTION INTRAMUSCULAR; INTRAVENOUS; SUBCUTANEOUS ONCE
Refills: 0 | Status: DISCONTINUED | OUTPATIENT
Start: 2019-12-31 | End: 2019-12-31

## 2019-12-31 RX ADMIN — Medication 12.5 MILLIGRAM(S): at 18:15

## 2019-12-31 RX ADMIN — Medication 12.5 MILLIGRAM(S): at 05:50

## 2019-12-31 RX ADMIN — GABAPENTIN 100 MILLIGRAM(S): 400 CAPSULE ORAL at 05:51

## 2019-12-31 RX ADMIN — BRIMONIDINE TARTRATE 1 DROP(S): 2 SOLUTION/ DROPS OPHTHALMIC at 13:09

## 2019-12-31 RX ADMIN — Medication 500000 UNIT(S): at 05:50

## 2019-12-31 RX ADMIN — Medication 10 MILLIGRAM(S): at 05:51

## 2019-12-31 RX ADMIN — MEROPENEM 100 MILLIGRAM(S): 1 INJECTION INTRAVENOUS at 23:40

## 2019-12-31 RX ADMIN — Medication 500000 UNIT(S): at 23:40

## 2019-12-31 RX ADMIN — Medication 500000 UNIT(S): at 18:15

## 2019-12-31 RX ADMIN — MYCOPHENOLATE MOFETIL 1000 MILLIGRAM(S): 250 CAPSULE ORAL at 18:15

## 2019-12-31 RX ADMIN — Medication 100 MILLIGRAM(S): at 13:07

## 2019-12-31 RX ADMIN — Medication 30 MILLIGRAM(S): at 13:09

## 2019-12-31 RX ADMIN — BRIMONIDINE TARTRATE 1 DROP(S): 2 SOLUTION/ DROPS OPHTHALMIC at 23:39

## 2019-12-31 RX ADMIN — Medication 1 TABLET(S): at 13:09

## 2019-12-31 RX ADMIN — MYCOPHENOLATE MOFETIL 1000 MILLIGRAM(S): 250 CAPSULE ORAL at 05:50

## 2019-12-31 RX ADMIN — Medication 500000 UNIT(S): at 13:09

## 2019-12-31 RX ADMIN — BRIMONIDINE TARTRATE 1 DROP(S): 2 SOLUTION/ DROPS OPHTHALMIC at 05:51

## 2019-12-31 RX ADMIN — GABAPENTIN 100 MILLIGRAM(S): 400 CAPSULE ORAL at 18:16

## 2019-12-31 RX ADMIN — HYDROMORPHONE HYDROCHLORIDE 0.25 MILLIGRAM(S): 2 INJECTION INTRAMUSCULAR; INTRAVENOUS; SUBCUTANEOUS at 14:42

## 2019-12-31 RX ADMIN — LATANOPROST 1 DROP(S): 0.05 SOLUTION/ DROPS OPHTHALMIC; TOPICAL at 13:08

## 2019-12-31 RX ADMIN — HYDROMORPHONE HYDROCHLORIDE 0.25 MILLIGRAM(S): 2 INJECTION INTRAMUSCULAR; INTRAVENOUS; SUBCUTANEOUS at 15:12

## 2019-12-31 RX ADMIN — FAMOTIDINE 20 MILLIGRAM(S): 10 INJECTION INTRAVENOUS at 13:08

## 2019-12-31 NOTE — PROGRESS NOTE ADULT - ATTENDING COMMENTS
agree with above  delayed function on dialysis  UTI ; antibiotics started  will postpone surgery till tomorrow  immunosuppression tacro , steroids and mmf

## 2019-12-31 NOTE — PROGRESS NOTE ADULT - SUBJECTIVE AND OBJECTIVE BOX
[facility] DIVISION OF KIDNEY DISEASES AND HYPERTENSION -- FOLLOW UP NOTE  --------------------------------------------------------------------------------  Chief Complaint:    24 hour events/subjective:  - yesterday Urine culture + GNR>100,000, started on merram, BCx NTD  - overnight no events reported, vitals wnl afebrile since admission, total UOP 75 cc voided  - patient seen and examined at bedside this morning endorse urinary urgency only  - vitals/lab/medications reviewed, noted for UCx GNR, BCx NTD          PAST HISTORY  --------------------------------------------------------------------------------  No significant changes to PMH, PSH, FHx, SHx, unless otherwise noted    ALLERGIES & MEDICATIONS  --------------------------------------------------------------------------------  Allergies    coconut (Anaphylaxis)  morphine (Pruritus; Rash)    Intolerances      Standing Inpatient Medications  allopurinol 100 milliGRAM(s) Oral daily  brimonidine 0.2% Ophthalmic Solution 1 Drop(s) Both EYES three times a day  famotidine    Tablet 20 milliGRAM(s) Oral daily  gabapentin 100 milliGRAM(s) Oral two times a day  latanoprost 0.005% Ophthalmic Solution 1 Drop(s) Both EYES daily  meropenem  IVPB 500 milliGRAM(s) IV Intermittent every 24 hours  meropenem  IVPB      metoprolol tartrate 12.5 milliGRAM(s) Oral every 12 hours  mycophenolate mofetil 1000 milliGRAM(s) Oral two times a day  nystatin    Suspension 800220 Unit(s) Oral four times a day  oseltamivir 30 milliGRAM(s) Oral once  predniSONE   Tablet 10 milliGRAM(s) Oral daily  senna 2 Tablet(s) Oral at bedtime  trimethoprim   80 mG/sulfamethoxazole 400 mG 1 Tablet(s) Oral daily  valGANciclovir 450 milliGRAM(s) Oral <User Schedule>    PRN Inpatient Medications  diphenhydrAMINE 25 milliGRAM(s) Oral once PRN      REVIEW OF SYSTEMS  Gen: No fatigue, chills, weakness  Respiratory: No dyspnea, wheezing  CV: No chest pain, PND, orthopnea  GI: No abdominal pain, diarrhea, constipation, nausea, vomiting, melena  : + dysuria, urinary urgency  MSK: + edema  Neuro: No dizziness/lightheadedness, weakness  All other systems were reviewed and are negative, except as noted.    VITALS/PHYSICAL EXAM  --------------------------------------------------------------------------------  T(C): 37.1 (12-31-19 @ 11:45), Max: 37.3 (12-31-19 @ 05:00)  HR: 64 (12-31-19 @ 11:45) (64 - 76)  BP: 149/83 (12-31-19 @ 11:45) (110/66 - 149/83)  RR: 18 (12-31-19 @ 11:45) (18 - 18)  SpO2: 97% (12-31-19 @ 11:45) (97% - 100%)  Wt(kg): --        12-30-19 @ 07:01  -  12-31-19 @ 07:00  --------------------------------------------------------  IN: 1130 mL / OUT: 270 mL / NET: 860 mL    12-31-19 @ 07:01  -  12-31-19 @ 12:26  --------------------------------------------------------  IN: 0 mL / OUT: 1500 mL / NET: -1500 mL      Physical Exam:  	Gen: NAD, well-appearing on room air  	HEENT: MMM, supple neck, no cervical LE  	Pulm: bibasilar crackles  	CV: RRR, S1S2; no murmur  	Abd: +BS, soft, nontender/nondistended, RLQ surgical wound  	: No suprapubic tenderness  	LE: Warm, edema b/l lower extremity +2 right/+1 left, LUE AVF+  	Skin: Warm, without rashes  	Vascular access:    LABS/STUDIES  --------------------------------------------------------------------------------              8.9    9.83  >-----------<  486      [12-31-19 @ 06:10]              27.5     134  |  92  |  46  ----------------------------<  90      [12-31-19 @ 06:10]  4.0   |  25  |  6.85        Ca     8.8     [12-31-19 @ 06:10]      Mg     2.1     [12-31-19 @ 06:10]      Phos  3.1     [12-31-19 @ 06:10]    TPro  7.0  /  Alb  3.0  /  TBili  0.9  /  DBili  0.5  /  AST  21  /  ALT  12  /  AlkPhos  301  [12-31-19 @ 06:10]    PT/INR: PT 13.3 , INR 1.15       [12-31-19 @ 06:10]  PTT: 29.8       [12-31-19 @ 06:10]      Creatinine Trend:  SCr 6.85 [12-31 @ 06:10]  SCr 5.19 [12-30 @ 06:30]  SCr 7.36 [12-29 @ 06:36]  SCr 5.72 [12-28 @ 07:02]  SCr 7.79 [12-27 @ 14:02]    Urinalysis - [12-29-19 @ 03:40]      Color Dark Brown / Appearance Turbid / SG 1.021 / pH 7.5      Gluc Negative / Ketone Trace  / Bili Small / Urobili <2 mg/dL       Blood Large / Protein 100 mg/dL / Leuk Est Large / Nitrite Negative      RBC >720 / WBC >720 / Hyaline 5 / Gran  / Sq Epi  / Non Sq Epi 8 / Bacteria Negative      HbA1c 5.0      [12-11-19 @ 08:21]    HBsAg Nonreact      [12-12-19 @ 22:37]  HCV 0.15, Nonreact      [12-12-19 @ 22:37]

## 2019-12-31 NOTE — PROGRESS NOTE ADULT - ASSESSMENT
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 In the ED febrile to 101.7, tachycardic > 90 and normotensive. WBC on presentation of 14.87 with 81.9% PMN.     RVP with Influenza A.   CXR Clear  At this point appears patient has Influenza infection without superimposed pneumonia.    Patient with pyuria on U/A from 12/29  >100K GNR on UCx  Patient with symptoms of suprapubic pressure which is new  Started on Meropenem on 12/30  Unclear if truly UTI but reasonable to empirically cover for ~72H of Abx    Overall, Positive Urine Culture, Influenza Infection, Leukocytosis, Fever, Renal Transplant Recipient    --Continue Tamiflu 30 mg PO post-HD (no doses on non-HD days). Needs one more dose  --Continue Meropenem 500 mg IV Q24H  --Follow up on ID and susceptibility of GNR on UCx  --Continue to follow CBC with diff  --Continue to follow temperature curve  --Follow up on preliminary blood cultures    I will be away starting tomorrow and will return on 1/3/20. Please contact the Infectious Diseases Office to contact the covering Infectious Diseases Attending.     Dada Jacobo M.D.  [facility] Division of Infectious Disease  8AM-5PM: Pager Number [phone]  After Hours (or if no response): Please contact the Infectious Diseases Office at [phone]

## 2019-12-31 NOTE — PROGRESS NOTE ADULT - ASSESSMENT
49M PMHx of ESRD on HD  ( via LUE AVF), HTN, Gout, Glaucoma, NET of pancreas (s/p robotic distal panc/splenectomy at [place] 2015 by Dr. Young, followed by Dr. Raphael, [facility] Oncologist), RA with hand contractures. 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 Post op course c/b DGF requiring HD, thrombocytopenia, elevated LDH and Haptoglobulin. Schistocytes  on peripheral smear concerning for TMA. Envarsus held. Received Belatacept 12/13. Started on PLEX (12/12, 12/15). Underwent renal bx on 12/13 c/w profound ATN.  Found to have much improvement to levels, likely related to Tacrolimus. He was discharged home on 12/20/19 w/ outpatient HD; today sent to ED after being found to be febrile to 102 at dialysis center found to have Flu A.     Influenza  - RVP + influenza A  - Tamiflu 30 mg PO after HD x 3 doses (12/27, 12/29). Last dose due 12/31 post-HD     s/p DDRT   - Increase in size of carlos-nephric hematoma noted on ultrasound 12/27 compared to previous.  - No notable improvement in renal graft function following treatment for TMA and discontinuance of CNI.   - Renal bx  12/13 with profound ATN  - Plan for OR tomorrow for washout and repeat renal graft biopsy. Will request early HD tomorrow prior to OR. Discussed with nephrology  - Continue HD TIW for delayed graft function. Tu/Th/Sat schedule  - TMA:  S/P PLEX x2 and CNI changed to Belatacept   -  Immunosuppression: Belatacept (given 12/28), MMF 1 gm BID, Pred 10  - PPX: Nystatin, bactrim QD, Valcyte renally dosed TIW  -Strict I/Os  -regular diet, low K.  NPO after MN for OR  -SCDs     HTN  -Well controlled at present  - Continue Metoprolol 12.5 BID   - hold  hydralazine 49M PMHx of ESRD on HD  ( via LUE AVF), HTN, Gout, Glaucoma, NET of pancreas (s/p robotic distal panc/splenectomy at [place] 2015 by Dr. Young, followed by Dr. Raphael, [facility] Oncologist), RA with hand contractures. 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 Post op course c/b DGF requiring HD, thrombocytopenia, elevated LDH and Haptoglobulin. Schistocytes  on peripheral smear concerning for TMA. Envarsus held. Received Belatacept 12/13. Started on PLEX (12/12, 12/15). Underwent renal bx on 12/13 c/w profound ATN.  Found to have much improvement to levels, likely related to Tacrolimus. He was discharged home on 12/20/19 w/ outpatient HD; today sent to ED after being found to be febrile to 102 at dialysis center found to have Flu A.     Influenza  - RVP + influenza A  - Tamiflu 30 mg PO after HD x 3 doses (12/27, 12/29). Last dose due today post-HD     s/p DDRT   - Increase in size of carlos-nephric hematoma noted on ultrasound 12/27 compared to previous.  - No notable improvement in renal graft function following treatment for TMA and discontinuance of CNI.   - Renal bx  12/13 with profound ATN  - OR for washout and repeat renal graft biopsy postponed to tomorrow due to bacturia  -regular diet, low K.  NPO after MN for OR  - Continue HD TIW for delayed graft function. Tu/Th/Sat schedule  - TMA:  S/P PLEX x2 and CNI changed to Belatacept   -  Immunosuppression: Belatacept (given 12/28), MMF 1 gm BID, Pred 10  - PPX: Nystatin, bactrim QD, Valcyte renally dosed TIW  -Strict I/Os  -SCDs     Bacturia   - Urine culture > 100,000 CFU gram negative rods, continue empirical meropenem     HTN,  -Well controlled at present  - Continue Metoprolol 12.5 BID   - hold  hydralazine

## 2019-12-31 NOTE — PROGRESS NOTE ADULT - PROBLEM SELECTOR PLAN 2
s/p belatacept 12/28    - next dose belatacept 01/06/2020  - continue MMF 1000 BID, Prednisone 10mg daily  - continue ppx: Nystatin, bactrim, Valcyte renally dosed TIW, pepcid  - monitor CBC.

## 2019-12-31 NOTE — PROGRESS NOTE ADULT - ATTENDING COMMENTS
Pt now with positive urine culture  Started antibiotics  Dialysis today for ATN   Plan for return to OR to be delayed until tomorrow.

## 2019-12-31 NOTE — PROGRESS NOTE ADULT - SUBJECTIVE AND OBJECTIVE BOX
Resting, UO poor, fever resolved, s/p HD this am    Vital Signs Last 24 Hrs  T(C): 36.4 (12-31-19 @ 14:10), Max: 37.3 (12-31-19 @ 05:00)  T(F): 97.5 (12-31-19 @ 14:10), Max: 99.1 (12-31-19 @ 05:00)  HR: 74 (12-31-19 @ 14:10) (64 - 76)  BP: 130/70 (12-31-19 @ 14:10) (110/66 - 149/83)  BP(mean): 100 (12-31-19 @ 01:00) (100 - 100)  RR: 18 (12-31-19 @ 14:10) (18 - 18)  SpO2: 99% (12-31-19 @ 14:10) (97% - 100%)    Card S1S2  Lungs b/l air entry  Abd soft, ND  Extr + edema, AVF patent                                                 8.9    9.83  )-----------( 486      ( 31 Dec 2019 06:10 )             27.5     31 Dec 2019 06:10    134    |  92     |  46     ----------------------------<  90     4.0     |  25     |  6.85     Ca    8.8        31 Dec 2019 06:10  Phos  3.1       31 Dec 2019 06:10  Mg     2.1       31 Dec 2019 06:10    TPro  7.0    /  Alb  3.0    /  TBili  0.9    /  DBili  0.5    /  AST  21     /  ALT  12     /  AlkPhos  301    31 Dec 2019 06:10    LIVER FUNCTIONS - ( 31 Dec 2019 06:10 )  Alb: 3.0 g/dL / Pro: 7.0 g/dL / ALK PHOS: 301 U/L / ALT: 12 U/L / AST: 21 U/L / GGT: x           PT/INR - ( 31 Dec 2019 06:10 )   PT: 13.3 sec;   INR: 1.15 ratio      Culture - Urine (collected 29 Dec 2019 22:02)  Source: .Urine Clean Catch (Midstream)  Preliminary Report (30 Dec 2019 18:50):    >100,000 CFU/ml Gram Negative Rods    allopurinol 100 milliGRAM(s) Oral daily  brimonidine 0.2% Ophthalmic Solution 1 Drop(s) Both EYES three times a day  diphenhydrAMINE 25 milliGRAM(s) Oral once PRN  famotidine    Tablet 20 milliGRAM(s) Oral daily  gabapentin 100 milliGRAM(s) Oral two times a day  latanoprost 0.005% Ophthalmic Solution 1 Drop(s) Both EYES daily  meropenem  IVPB 500 milliGRAM(s) IV Intermittent every 24 hours  meropenem  IVPB      metoprolol tartrate 12.5 milliGRAM(s) Oral every 12 hours  mycophenolate mofetil 1000 milliGRAM(s) Oral two times a day  nystatin    Suspension 425179 Unit(s) Oral four times a day  predniSONE   Tablet 10 milliGRAM(s) Oral daily  senna 2 Tablet(s) Oral at bedtime  trimethoprim   80 mG/sulfamethoxazole 400 mG 1 Tablet(s) Oral daily  valGANciclovir 450 milliGRAM(s) Oral <User Schedule>    A/P:    Flu A  ESRD on HD  S/p DDRT 12/8/2019  DGF  S/p transplant kidney bx 12/13: ATN, focal TMA  S/p PLEX 12/13 and 12/15  Araceli-nephric hematoma noted on ultrasound 12/27, increased from prior  Plan was for OR today for washout and repeat renal graft biopsy  OR on hold, dx w/UTI, Abx  S/p HD today  Next HD Thursday  Epogen w/HD  Heparin free  TMP as able avoiding hypotension during HD  D/w transplant team

## 2019-12-31 NOTE — PROGRESS NOTE ADULT - SUBJECTIVE AND OBJECTIVE BOX
Follow Up:  Flu, ?UTI    Interval History: notes continued suprapubic pressure today. denies dysuria or urinary frequency. notes improved cough    REVIEW OF SYSTEMS  [  ] ROS unobtainable because:    [ x ] All other systems negative except as noted below    Constitutional:  [ ] fever [ ] chills  [ ] weight loss  [ ] weakness  Skin:  [ ] rash [ ] phlebitis	  Eyes: [ ] icterus [ ] pain  [ ] discharge	  ENMT: [ ] sore throat  [ ] thrush [ ] ulcers [ ] exudates  Respiratory: [ ] dyspnea [ ] hemoptysis [ x] cough [ ] sputum	  Cardiovascular:  [ ] chest pain [ ] palpitations [ ] edema	  Gastrointestinal:  [ ] nausea [ ] vomiting [ ] diarrhea [ ] constipation [ ] pain	  Genitourinary:  [ ] dysuria [ ] frequency [ ] hematuria [ ] discharge [ ] flank pain  [ ] incontinence +suprapubic discomfort  Musculoskeletal:  [ ] myalgias [ ] arthralgias [ ] arthritis  [ ] back pain  Neurological:  [ ] headache [ ] seizures  [ ] confusion/altered mental status    Allergies  coconut (Anaphylaxis)  morphine (Pruritus; Rash)        ANTIMICROBIALS:  meropenem  IVPB 500 every 24 hours  meropenem  IVPB    nystatin    Suspension [id number] four times a day  trimethoprim   80 mG/sulfamethoxazole 400 mG 1 daily  valGANciclovir 450 <User Schedule>      OTHER MEDS:  MEDICATIONS  (STANDING):  allopurinol 100 daily  diphenhydrAMINE 25 once PRN  famotidine    Tablet 20 daily  gabapentin 100 two times a day  metoprolol tartrate 12.5 every 12 hours  mycophenolate mofetil 1000 two times a day  predniSONE   Tablet 10 daily  senna 2 at bedtime      Vital Signs Last 24 Hrs  T(C): 36.4 (31 Dec 2019 14:10), Max: 37.3 (31 Dec 2019 05:00)  T(F): 97.5 (31 Dec 2019 14:10), Max: 99.1 (31 Dec 2019 05:00)  HR: 74 (31 Dec 2019 14:10) (64 - 76)  BP: 130/70 (31 Dec 2019 14:10) (110/66 - 149/83)  BP(mean): 100 (31 Dec 2019 01:00) (100 - 100)  RR: 18 (31 Dec 2019 14:10) (18 - 18)  SpO2: 99% (31 Dec 2019 14:10) (97% - 100%)    PHYSICAL EXAMINATION:  General: Alert and Awake, NAD  HEENT: PERRL, EOMI  Neck: Supple  Cardiac: RRR, No M/R/G  Resp: CTAB, No Wh/Rh/Ra  Abdomen: NBS, NT/ND, No HSM, No rigidity or guarding  MSK: No LE edema. No Calf tenderness  : No martinez  Skin: No rashes or lesions. Skin is warm and dry to the touch.   Neuro: Alert and Awake. CN 2-12 Grossly intact. Moves all four extremities spontaneously.  Psych: Calm, Pleasant, Cooperative                          8.9    9.83  )-----------( 486      ( 31 Dec 2019 06:10 )             27.5       12-31    134<L>  |  92<L>  |  46<H>  ----------------------------<  90  4.0   |  25  |  6.85<H>    Ca    8.8      31 Dec 2019 06:10  Phos  3.1     12-31  Mg     2.1     12-31    TPro  7.0  /  Alb  3.0<L>  /  TBili  0.9  /  DBili  0.5<H>  /  AST  21  /  ALT  12  /  AlkPhos  301<H>  12-31          MICROBIOLOGY:  v  .Urine Clean Catch (Midstream)  12-29-19   >100,000 CFU/ml Gram Negative Rods  --  --      .Blood Blood-Peripheral  12-27-19   No growth to date.  --  --      .Stool Feces  12-14-19   No enteric pathogens isolated.  (Stool culture examined for Salmonella,  Shigella, Campylobacter, Aeromonas, Plesiomonas,  Vibrio, E.coli O157 and Yersinia)  --  --      .Stool moriah johnie  12-14-19   GI PCR Results: NOT detected  *******Please Note:*******  GI panel PCR evaluates for:  Campylobacter, Plesiomonas shigelloides, Salmonella,  Vibrio, Yersinia enterocolitica, Enteroaggregative  Escherichia coli (EAEC), Enteropathogenic E.coli (EPEC),  Enterotoxigenic E. coli (ETEC) lt/st, Shiga-like  toxin-producing E. coli (STEC) stx1/stx2,  Shigella/ Enteroinvasive E. coli (EIEC), Cryptosporidium,  Cyclospora cayetanensis, Entamoeba histolytica,  Giardia lamblia, Adenovirus F 40/41, Astrovirus,  Norovirus GI/GII, Rotavirus A, Sapovirus  --  --      .Nose Nose  12-10-19   Few Methicillin Sensitive Staph aureus "This can represent normal nasal  carriage.  PCR is more sensitive for identifying MRSA/MSSA carriage"  --  --          Rapid RVP Result: Detected (12-27 @ 08:36)        RADIOLOGY:    EXAM:  US KIDNEY TRANSPLANT W DOPP RT                        PROCEDURE DATE:  12/27/2019    Elevated velocities at the renal artery anastomosis, similar in appearance to 12/18/2019. Recommend continued follow up Doppler ultrasound.  High impedance waveforms throughout the transplant kidney, new since 12/18/2019, nonspecific. Recommend continued follow-up.  A 9.4 cm perinephric hematoma, previously documented as measuring 5.7 cm.  Urothelial thickening, nonspecific.    EXAM:  XR CHEST PORTABLE URGENT 1V                        PROCEDURE DATE:  12/27/2019    No acute process.

## 2019-12-31 NOTE — PROGRESS NOTE ADULT - SUBJECTIVE AND OBJECTIVE BOX
Transplant Surgery - Multidisciplinary Rounds  --------------------------------------------------------------  R DDRT    Date:  12/8 - Dr. Espinal       Present:   Patient seen with multidisciplinary team including Transplant Surgeon: Dr. Espinal, Dr. Be, Dr. Cruz,  Transplant Nephrologist: Dr. SHARLENE Puckett. Nurse Practitioner: Aislinn Hunt and MAEVE Montano in am rounds and examined with Dr. Cruz    Disciplines not in attendance will be notified of the plan.     HPI: 49M PMHx of ESRD on HD  ( via LUE AVF), HTN, Gout, Glaucoma, NET of pancreas (s/p robotic distal panc/splenectomy at [place] 2015 by Dr. Young, followed by Dr. Raphael, [facility] Oncologist), RA with hand contractures. He underwent DDRT on 12/8/19 (ureteral stent sutured to [place] - removed 12/15). Post op course c/b DGF requiring HD, thrombocytopenia, elevated LDH and Haptoglobulin. Schistocytes  on peripheral smear concerning for TMA. Envarsus held. Received Belatacept 12/13. Started on PLEX (12/12, 12/15). Underwent renal bx on 12/13 c/w profound ATN.  Found to have much improvement to levels, likely related to Tacrolimus. He was discharged home on 12/20/19 w/ outpatient HD.     He was now sent to ED by dialysis center after he was found to be febrile to 102, he did not get HD this AM, and last full HD session was on 12/26. Upon arrival, he was febrile to 101.7, other vitals were stable. He states that he had a cough that began 5-6 days ago that has now mostly resolved. He reports no other febrile episodes other than this morning. He admits to having one SOB episode earlier this week that resolved after he got HD. He also states that his mother was hospitalized earlier this week and was flu+. Currently makes less than 1 cup urine/daily. He currently denies SOB, CP, dyspnea, HA, dizziness, N/V, diarrhea, constipation.     Recipient info:   CPRA:    0%  ABO:      A+  CMVAb:  Positive  EBVIgG Status:  Positive  Last HD:  12/7/2019    Donor ID:  [id number]  Match: [id number]  OPO: NYRT  Age:  49  ABO:  A  KDPI 81%  COD:  Anoxia  X Clamp Time:  12/7/2019 1538.  Medical Hx: DM, HTN, HLD, obesity BMI 53, CAD, CABAGX3  Terminal Cr:  1/1.8/1.7  CMV- Neg EBV- Neg    OR:    DDRT to right iliac fossa. Simulect induction. Two arteries anastomosed to a single cuff on the back table. One vein, one ureter. Ureteral anastomosis performed over a double J stent, which was sutured to the martinez.   Cold ischemia time 25.5 hours.      Interval events:    - 12/27   + Flu A.   Received #2 of 3 planned Tamiflu doses post-HD (12/27, 12/29).     Denies cough, SOB or body aches.   - 12/27 blood Cx  NGTD  - Received 1unit PRBC for anemia during HD yesterday  - H/H 8.6/25.8 from 8.2/23.5  - Abdominal dressing dry without notable drainage this morning  - 12/27 renal u/s demonstrated increased perinephric hematoma compared to previous 9.4cm from 5.7. Elevated velocities at the renal artery anastomosis, similar in appearance to 12/18/2019. No Tardus parvus. Nonspecific High impedance waveforms throughout the transplant kidney, new since 12/18/2019.    Potential Discharge date: pending clinical improvement    Education:  Medications    Plan of care:  See Below    MEDICATIONS  (STANDING):  allopurinol 100 milliGRAM(s) Oral daily  brimonidine 0.2% Ophthalmic Solution 1 Drop(s) Both EYES three times a day  famotidine    Tablet 20 milliGRAM(s) Oral daily  gabapentin 100 milliGRAM(s) Oral two times a day  latanoprost 0.005% Ophthalmic Solution 1 Drop(s) Both EYES daily  metoprolol tartrate 12.5 milliGRAM(s) Oral every 12 hours  mycophenolate mofetil 1000 milliGRAM(s) Oral two times a day  nystatin    Suspension 938053 Unit(s) Oral four times a day  oseltamivir 30 milliGRAM(s) Oral <User Schedule>  predniSONE   Tablet 10 milliGRAM(s) Oral daily  senna 2 Tablet(s) Oral at bedtime  trimethoprim   80 mG/sulfamethoxazole 400 mG 1 Tablet(s) Oral daily  valGANciclovir 450 milliGRAM(s) Oral <User Schedule>    MEDICATIONS  (PRN):  diphenhydrAMINE 25 milliGRAM(s) Oral once PRN Rash and/or Itching      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  Carpal tunnel syndrome  Brain cyst: had MRI recently- now gone  Anemia  Gastric ulcer: Long time ago  Rheumatoid arthritis  Renal transplant recipient  Breakdown (mechanical) of penile (implanted) prosthesis, sequela  End-stage renal disease (ESRD): PERMACATH PLACEMENT  Abscess of left buttock: s/p I &amp; D  AV fistula: placement left lower arm  S/P cystoscopy: stent placement &amp; removal for kidney stones, lithiotripsy  s/p Lt Carpal tunnel: 2011      Vital Signs Last 24 Hrs  T(C): 37.4 (30 Dec 2019 08:53), Max: 37.7 (30 Dec 2019 01:00)  T(F): 99.4 (30 Dec 2019 08:53), Max: 99.8 (30 Dec 2019 01:00)  HR: 73 (30 Dec 2019 08:53) (64 - 80)  BP: 117/70 (30 Dec 2019 08:53) (114/65 - 139/76)  BP(mean): --  RR: 18 (30 Dec 2019 08:53) (16 - 18)  SpO2: 98% (30 Dec 2019 08:53) (97% - 99%)    I&O's Summary    29 Dec 2019 07:01  -  30 Dec 2019 07:00  --------------------------------------------------------  IN: 2280 mL / OUT: 3400 mL / NET: -1120 mL                            8.6    10.06 )-----------( 434      ( 30 Dec 2019 06:34 )             25.8     12-30    132<L>  |  90<L>  |  30<H>  ----------------------------<  94  3.7   |  27  |  5.19<H>    Ca    8.9      30 Dec 2019 06:30  Phos  2.6     12-30  Mg     2.0     12-30    TPro  6.6  /  Alb  3.0<L>  /  TBili  1.0  /  DBili  0.5<H>  /  AST  22  /  ALT  11  /  AlkPhos  296<H>  12-30        Culture - Blood (collected 12-27-19 @ 13:12)  Source: .Blood Blood-Peripheral  Preliminary Report (12-28-19 @ 14:01):    No growth to date.    Culture - Blood (collected 12-27-19 @ 13:12)  Source: .Blood Blood-Peripheral  Preliminary Report (12-28-19 @ 14:01):    No growth to date.        Review of systems   Gen: No weight changes, fatigue, weakness.   	Skin: No rashes  	Head/Eyes/Ears/Mouth: No headache; Normal hearing; Normal vision w/o blurriness; No sinus pain/discomfort, admits to having a sore throat and cough earlier this week that has now significantly improved.   	Respiratory: Pos occasional cough No dyspnea, wheezing, hemoptysis  	CV: No chest pain, PND, orthopnea  	GI: No abdominal pain, diarrhea, constipation, nausea, vomiting, melena, hematochezia  	: No increased frequency, dysuria, hematuria, nocturia  	MSK: No joint pain/swelling; no back pain; admits to LE edema that he states has improved   	Neuro: No dizziness/lightheadedness, weakness, seizures, numbness, tingling  	Heme: No easy bruising or bleeding  	Endo: No heat/cold intolerance  	Psych: No significant nervousness, anxiety, stress, depression  All other systems were reviewed and are negative, except as noted.      PHYSICAL EXAM:  Constitutional: Well developed / well nourished  Eyes: Anicteric, PERRLA  ENMT: nc/at  Neck: Supple  Respiratory: CTA b/l    Cardiovascular: RRR  Gastrointestinal: Soft abdomen, NT, ND.   Genitourinary:  Voiding spontaneously  Extremities: SCD's in place and working bilaterally  Vascular: faintly palpable pulses b/l LE; 2+ pitting edema.   Neurological: A&O x3  Skin: Wound not healed; some staples still present. No drainage expressed from wound this morning.    Musculoskeletal: Moving all extremities Psychiatric: Responsive Transplant Surgery - Multidisciplinary Rounds  --------------------------------------------------------------  R DDRT    Date:   - Dr. Espinal       Present: Patient seen with multidisciplinary team including Transplant Surgeon: Dr. Espinal, Dr. Cruz,  Transplant Nephrologist: Dr. SHARLENE Puckett, fellow Dr. Hutchinson, Nurse Practitioner: Aislinn Hunt and surgical resident Maria Alejandra in am rounds and examined with Dr. Cruz. Disciplines not in attendance will be notified of the plan.    Interval events:    - Gram - bacteremia with questionable dysuria, afebrile; started on meropenem  - Abdominal dressing dry without notable drainage this morning  -    + Flu A.   Received #2 of 3 planned Tamiflu doses post-HD (, ).     Denies cough, SOB or body aches.   -  blood Cx  NGTD  -  renal u/s demonstrated increased perinephric hematoma compared to previous 9.4cm from 5.7. Elevated velocities at the renal artery anastomosis, similar in appearance to 2019. No Tardus parvus. Nonspecific High impedance waveforms throughout the transplant kidney, new since 2019.    HPI: 49M PMHx of ESRD on HD  ( via LUE AVF), HTN, Gout, Glaucoma, NET of pancreas (s/p robotic distal panc/splenectomy at [place]  by Dr. Young, followed by Dr. Raphael, [facility] Oncologist), RA with hand contractures. He underwent DDRT on 19 (ureteral stent sutured to [place] - removed 12/15). Post op course c/b DGF requiring HD, thrombocytopenia, elevated LDH and Haptoglobulin. Schistocytes  on peripheral smear concerning for TMA. Envarsus held. Received Belatacept . Started on PLEX (, 12/15). Underwent renal bx on  c/w profound ATN.  Found to have much improvement to levels, likely related to Tacrolimus. He was discharged home on 19 w/ outpatient HD.     He was now sent to ED by dialysis center after he was found to be febrile to 102, he did not get HD this AM, and last full HD session was on . Upon arrival, he was febrile to 101.7, other vitals were stable. He states that he had a cough that began 5-6 days ago that has now mostly resolved. He reports no other febrile episodes other than this morning. He admits to having one SOB episode earlier this week that resolved after he got HD. He also states that his mother was hospitalized earlier this week and was flu+. Currently makes less than 1 cup urine/daily. He currently denies SOB, CP, dyspnea, HA, dizziness, N/V, diarrhea, constipation.     Recipient info:   CPRA:    0%  ABO:      A+  CMVAb:  Positive  EBVIgG Status:  Positive  Last HD:  2019    Donor ID:  [id number]  Match: [id number]  OPO: NYRT  Age:  49  ABO:  A  KDPI 81%  COD:  Anoxia  X Clamp Time:  2019 1538.  Medical Hx: DM, HTN, HLD, obesity BMI 53, CAD, CABAGX3  Terminal Cr:  1/1.8/1.7  CMV- Neg EBV- Neg    OR:    DDRT to right iliac fossa. Simulect induction. Two arteries anastomosed to a single cuff on the back table. One vein, one ureter. Ureteral anastomosis performed over a double J stent, which was sutured to the martinez.   Cold ischemia time 25.5 hours.    Potential Discharge date: pending clinical improvement    Education:  Medications    Plan of care:  See Below    REVIEW OF SYSTEMS   Gen: No weight changes, fatigue, weakness.   	Skin: No rashes  	Head/Eyes/Ears/Mouth: No headache; Normal hearing; Normal vision w/o blurriness; No sinus pain/discomfort, admits to having a sore throat and cough earlier this week that has now significantly improved.   	Respiratory: Pos occasional cough No dyspnea, wheezing, hemoptysis  	CV: No chest pain, PND, orthopnea  	GI: No abdominal pain, diarrhea, constipation, nausea, vomiting, melena, hematochezia  	: No increased frequency, dysuria, hematuria, nocturia  	MSK: No joint pain/swelling; no back pain; admits to LE edema that he states has improved   	Neuro: No dizziness/lightheadedness, weakness, seizures, numbness, tingling  	Heme: No easy bruising or bleeding  	Endo: No heat/cold intolerance  	Psych: No significant nervousness, anxiety, stress, depression  All other systems were reviewed and are negative, except as noted    MEDICATIONS  (STANDING):  allopurinol 100 milliGRAM(s) Oral daily  brimonidine 0.2% Ophthalmic Solution 1 Drop(s) Both EYES three times a day  famotidine    Tablet 20 milliGRAM(s) Oral daily  gabapentin 100 milliGRAM(s) Oral two times a day  latanoprost 0.005% Ophthalmic Solution 1 Drop(s) Both EYES daily  meropenem  IVPB 500 milliGRAM(s) IV Intermittent every 24 hours  meropenem  IVPB      metoprolol tartrate 12.5 milliGRAM(s) Oral every 12 hours  mycophenolate mofetil 1000 milliGRAM(s) Oral two times a day  nystatin    Suspension [id number] Unit(s) Oral four times a day  oseltamivir 30 milliGRAM(s) Oral once  predniSONE   Tablet 10 milliGRAM(s) Oral daily  senna 2 Tablet(s) Oral at bedtime  trimethoprim   80 mG/sulfamethoxazole 400 mG 1 Tablet(s) Oral daily  valGANciclovir 450 milliGRAM(s) Oral <User Schedule>    MEDICATIONS  (PRN):  diphenhydrAMINE 25 milliGRAM(s) Oral once PRN Rash and/or Itching    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  Carpal tunnel syndrome  Brain cyst: had MRI recently- now gone  Anemia  Gastric ulcer: Long time ago  Rheumatoid arthritis  Renal transplant recipient  Breakdown (mechanical) of penile (implanted) prosthesis, sequela  End-stage renal disease (ESRD): PERMACATH PLACEMENT  Abscess of left buttock: s/p I &amp; D  AV fistula: placement left lower arm  S/P cystoscopy: stent placement &amp; removal for kidney stones, lithiotripsy  s/p Lt Carpal tunnel:     Vital Signs Last 24 Hrs  Vital Signs Last 24 Hrs  T(C): 36.8 (31 Dec 2019 08:45), Max: 37.3 (31 Dec 2019 05:00)  T(F): 98.3 (31 Dec 2019 08:45), Max: 99.1 (31 Dec 2019 05:00)  HR: 74 (31 Dec 2019 08:45) (70 - 76)  BP: 135/79 (31 Dec 2019 08:45) (110/66 - 145/81)  BP(mean): 100 (31 Dec 2019 01:00) (100 - 100)  RR: 18 (31 Dec 2019 08:45) (18 - 18)  SpO2: 97% (31 Dec 2019 08:45) (97% - 100%)    I&O's Summary  30 Dec 2019 07:01  -  31 Dec 2019 07:00  --------------------------------------------------------  IN: 1130 mL / OUT: 270 mL / NET: 860 mL    Labs:   CBC ( @ 06:10)                          8.9<L>                   9.83    )--------------(  486<H>     66.1  % Neuts, 16.5  % Lymphs, ANC: 6.50                            27.5<L>  CBC ( @ 06:34)                          8.6<L>                   10.06   )--------------(  434<H>     75.5  % Neuts, 19.3  % Lymphs, ANC: 7.60<H>                          25.8<L>    BMP ( @ 06:10)       134<L>  |  92<L>   |  46<H> 			Ca++ --      Ca 8.8          ---------------------------------( 90    		Mg 2.1          4.0     |  25      |  6.85<H>			Ph 3.1     BMP ( @ 06:30)       132<L>  |  90<L>   |  30<H> 			Ca++ --      Ca 8.9          ---------------------------------( 94    		Mg 2.0          3.7     |  27      |  5.19<H>			Ph 2.6       LFTs ( @ 06:10)      TPro 7.0 / Alb 3.0<L> / TBili 0.9 / DBili 0.5<H> / AST 21 / ALT 12 / AlkPhos 301<H>  LFTs ( @ 06:30)      TPro 6.6 / Alb 3.0<L> / TBili 1.0 / DBili 0.5<H> / AST 22 / ALT 11 / AlkPhos 296<H>    Coags ( @ 06:10)  aPTT 29.8 / INR 1.15 / PT 13.3<H>    Urinalysis ( @ 03:40):     Color: Dark Brown<!> / Appearance: Turbid<!> / S.021 / pH: 7.5 / Gluc: Negative / Ketones: Trace / Bili: Small<!> / Urobili: <2 mg/dL / Protein :100 mg/dL<!> / Nitrites: Negative / Leuk.Est: Large<!> / RBC: >720<H> / WBC: >720<H> / Sq Epi:  / Non Sq Epi: 8<H> / Bacteria Negative       -> .Urine Clean Catch (Midstream) Culture ( @ 22:02)     NG    NG    >100,000 CFU/ml Gram Negative Rods    -> .Blood Blood-Peripheral Culture ( @ 13:12)     NG    NG    No growth to date.    PHYSICAL EXAM:  Constitutional: Well developed / well nourished  Eyes: Anicteric, PERRLA  ENMT: nc/at   Neck: Supple  Respiratory: CTA b/l    Cardiovascular: RRR  Gastrointestinal: Soft abdomen, NT, ND.   Genitourinary:  Voiding spontaneously  Extremities: SCD's in place and working bilaterally  Vascular: faintly palpable pulses b/l LE; 2+ pitting edema.   Neurological: A&O x3  Skin: Wound not healed; some staples still present. No drainage expressed from wound this morning.    Musculoskeletal: Moving all extremities Psychiatric: Responsive

## 2019-12-31 NOTE — PROGRESS NOTE ADULT - PROBLEM SELECTOR PLAN 3
Hematoma in setting of biopsy and plasma exchange.  s/p prbc transfusion 12/29    - Postpone OR for washout and repeat renal graft biopsy (due to +urine culture)  - monitor CBC (transfuse as needed)

## 2019-12-31 NOTE — PROGRESS NOTE ADULT - PROBLEM SELECTOR PLAN 1
s/p DDRT 12/8/19 c/b Delayed graft function in setting ATN/focal TMA (?tacrolimus) requiring HD, PLEX 12/12 and plasmapheresis 12/14, switch tacrolimus to belatacept  -12/27 renal u/s demonstrated increased perinephric hematoma compared to previous 9.4cm from 5.7.    - postpone OR washout and repeat allograft biopsy given Urine culture +GNR  - continue HD per primary nephrologist  - continue immunosuppression as stated below  - trend SCr, electrolytes, strict I/O, daily standing weight, renal diet/low K  - Avoiding nephrotoxic agents-NSAIDs, contrast, nephrotoxic antibiotics, ACEI/ARB.

## 2020-01-01 ENCOUNTER — RESULT REVIEW (OUTPATIENT)
Age: 50
End: 2020-01-01

## 2020-01-01 LAB
ALBUMIN SERPL ELPH-MCNC: 2.6 G/DL — LOW (ref 3.3–5)
ALP SERPL-CCNC: 288 U/L — HIGH (ref 40–120)
ALT FLD-CCNC: 14 U/L — SIGNIFICANT CHANGE UP (ref 10–45)
ANION GAP SERPL CALC-SCNC: 21 MMOL/L — HIGH (ref 5–17)
AST SERPL-CCNC: 27 U/L — SIGNIFICANT CHANGE UP (ref 10–40)
BILIRUB DIRECT SERPL-MCNC: 0.4 MG/DL — HIGH (ref 0–0.2)
BILIRUB INDIRECT FLD-MCNC: 0.7 MG/DL — SIGNIFICANT CHANGE UP (ref 0.2–1)
BILIRUB SERPL-MCNC: 1.1 MG/DL — SIGNIFICANT CHANGE UP (ref 0.2–1.2)
BUN SERPL-MCNC: 36 MG/DL — HIGH (ref 7–23)
CALCIUM SERPL-MCNC: 8.9 MG/DL — SIGNIFICANT CHANGE UP (ref 8.4–10.5)
CHLORIDE SERPL-SCNC: 95 MMOL/L — LOW (ref 96–108)
CO2 SERPL-SCNC: 19 MMOL/L — LOW (ref 22–31)
CREAT SERPL-MCNC: 5.51 MG/DL — HIGH (ref 0.5–1.3)
CULTURE RESULTS: SIGNIFICANT CHANGE UP
CULTURE RESULTS: SIGNIFICANT CHANGE UP
GLUCOSE SERPL-MCNC: 74 MG/DL — SIGNIFICANT CHANGE UP (ref 70–99)
GRAM STN FLD: SIGNIFICANT CHANGE UP
HCT VFR BLD CALC: 28.1 % — LOW (ref 39–50)
HGB BLD-MCNC: 8.9 G/DL — LOW (ref 13–17)
MAGNESIUM SERPL-MCNC: 2 MG/DL — SIGNIFICANT CHANGE UP (ref 1.6–2.6)
MCHC RBC-ENTMCNC: 30.3 PG — SIGNIFICANT CHANGE UP (ref 27–34)
MCHC RBC-ENTMCNC: 31.7 GM/DL — LOW (ref 32–36)
MCV RBC AUTO: 95.6 FL — SIGNIFICANT CHANGE UP (ref 80–100)
MISCELLANEOUS TEST NAME: SIGNIFICANT CHANGE UP
MISCELLANEOUS, NORMALS: SIGNIFICANT CHANGE UP
MISCELLANEOUS, RESULT: SIGNIFICANT CHANGE UP
MISCELLANEOUS, RESULT: SIGNIFICANT CHANGE UP
NRBC # BLD: 0 /100 WBCS — SIGNIFICANT CHANGE UP (ref 0–0)
PHOSPHATE SERPL-MCNC: 3.3 MG/DL — SIGNIFICANT CHANGE UP (ref 2.5–4.5)
PLATELET # BLD AUTO: 515 K/UL — HIGH (ref 150–400)
POTASSIUM SERPL-MCNC: 5.2 MMOL/L — SIGNIFICANT CHANGE UP (ref 3.5–5.3)
POTASSIUM SERPL-SCNC: 5.2 MMOL/L — SIGNIFICANT CHANGE UP (ref 3.5–5.3)
PROT SERPL-MCNC: 6.8 G/DL — SIGNIFICANT CHANGE UP (ref 6–8.3)
RBC # BLD: 2.94 M/UL — LOW (ref 4.2–5.8)
RBC # FLD: 18 % — HIGH (ref 10.3–14.5)
SODIUM SERPL-SCNC: 135 MMOL/L — SIGNIFICANT CHANGE UP (ref 135–145)
SPECIMEN SOURCE: SIGNIFICANT CHANGE UP
WBC # BLD: 8.42 K/UL — SIGNIFICANT CHANGE UP (ref 3.8–10.5)
WBC # FLD AUTO: 8.42 K/UL — SIGNIFICANT CHANGE UP (ref 3.8–10.5)

## 2020-01-01 PROCEDURE — 88341 IMHCHEM/IMCYTCHM EA ADD ANTB: CPT | Mod: 26

## 2020-01-01 PROCEDURE — 99232 SBSQ HOSP IP/OBS MODERATE 35: CPT | Mod: GC,24

## 2020-01-01 PROCEDURE — 88350 IMFLUOR EA ADDL 1ANTB STN PX: CPT | Mod: 26

## 2020-01-01 PROCEDURE — 88342 IMHCHEM/IMCYTCHM 1ST ANTB: CPT | Mod: 26,59

## 2020-01-01 PROCEDURE — 50205 RENAL BX SURG EXPOSURE KDN: CPT | Mod: RT,78

## 2020-01-01 PROCEDURE — 88305 TISSUE EXAM BY PATHOLOGIST: CPT | Mod: 26

## 2020-01-01 PROCEDURE — 49084 PERITONEAL LAVAGE: CPT | Mod: 78

## 2020-01-01 PROCEDURE — 88348 ELECTRON MICROSCOPY DX: CPT | Mod: 26

## 2020-01-01 PROCEDURE — 88312 SPECIAL STAINS GROUP 1: CPT | Mod: 26

## 2020-01-01 PROCEDURE — 88346 IMFLUOR 1ST 1ANTB STAIN PX: CPT | Mod: 26

## 2020-01-01 PROCEDURE — 76776 US EXAM K TRANSPL W/DOPPLER: CPT | Mod: 26

## 2020-01-01 PROCEDURE — 88313 SPECIAL STAINS GROUP 2: CPT | Mod: 26

## 2020-01-01 PROCEDURE — 35840 EXPLORE ABDOMINAL VESSELS: CPT | Mod: 78

## 2020-01-01 RX ORDER — LATANOPROST 0.05 MG/ML
1 SOLUTION/ DROPS OPHTHALMIC; TOPICAL DAILY
Refills: 0 | Status: DISCONTINUED | OUTPATIENT
Start: 2020-01-01 | End: 2020-02-14

## 2020-01-01 RX ORDER — HYDROMORPHONE HYDROCHLORIDE 2 MG/ML
0.5 INJECTION INTRAMUSCULAR; INTRAVENOUS; SUBCUTANEOUS
Refills: 0 | Status: DISCONTINUED | OUTPATIENT
Start: 2020-01-01 | End: 2020-01-01

## 2020-01-01 RX ORDER — ALLOPURINOL 300 MG
100 TABLET ORAL DAILY
Refills: 0 | Status: DISCONTINUED | OUTPATIENT
Start: 2020-01-01 | End: 2020-01-10

## 2020-01-01 RX ORDER — ONDANSETRON 8 MG/1
4 TABLET, FILM COATED ORAL ONCE
Refills: 0 | Status: DISCONTINUED | OUTPATIENT
Start: 2020-01-01 | End: 2020-01-01

## 2020-01-01 RX ORDER — NYSTATIN 500MM UNIT
500000 POWDER (EA) MISCELLANEOUS
Refills: 0 | Status: DISCONTINUED | OUTPATIENT
Start: 2020-01-01 | End: 2020-01-05

## 2020-01-01 RX ORDER — BRIMONIDINE TARTRATE 2 MG/MG
1 SOLUTION/ DROPS OPHTHALMIC THREE TIMES A DAY
Refills: 0 | Status: DISCONTINUED | OUTPATIENT
Start: 2020-01-01 | End: 2020-02-14

## 2020-01-01 RX ORDER — SENNA PLUS 8.6 MG/1
2 TABLET ORAL AT BEDTIME
Refills: 0 | Status: DISCONTINUED | OUTPATIENT
Start: 2020-01-01 | End: 2020-01-10

## 2020-01-01 RX ORDER — METOPROLOL TARTRATE 50 MG
12.5 TABLET ORAL EVERY 12 HOURS
Refills: 0 | Status: DISCONTINUED | OUTPATIENT
Start: 2020-01-01 | End: 2020-01-05

## 2020-01-01 RX ORDER — HYDROMORPHONE HYDROCHLORIDE 2 MG/ML
30 INJECTION INTRAMUSCULAR; INTRAVENOUS; SUBCUTANEOUS
Refills: 0 | Status: DISCONTINUED | OUTPATIENT
Start: 2020-01-01 | End: 2020-01-02

## 2020-01-01 RX ORDER — FAMOTIDINE 10 MG/ML
20 INJECTION INTRAVENOUS DAILY
Refills: 0 | Status: DISCONTINUED | OUTPATIENT
Start: 2020-01-01 | End: 2020-01-10

## 2020-01-01 RX ORDER — MYCOPHENOLATE MOFETIL 250 MG/1
1000 CAPSULE ORAL
Refills: 0 | Status: DISCONTINUED | OUTPATIENT
Start: 2020-01-01 | End: 2020-01-04

## 2020-01-01 RX ORDER — HYDROMORPHONE HYDROCHLORIDE 2 MG/ML
0.5 INJECTION INTRAMUSCULAR; INTRAVENOUS; SUBCUTANEOUS
Refills: 0 | Status: DISCONTINUED | OUTPATIENT
Start: 2020-01-01 | End: 2020-01-02

## 2020-01-01 RX ORDER — DIPHENHYDRAMINE HCL 50 MG
25 CAPSULE ORAL ONCE
Refills: 0 | Status: DISCONTINUED | OUTPATIENT
Start: 2020-01-01 | End: 2020-01-05

## 2020-01-01 RX ORDER — NALOXONE HYDROCHLORIDE 4 MG/.1ML
0.1 SPRAY NASAL
Refills: 0 | Status: DISCONTINUED | OUTPATIENT
Start: 2020-01-01 | End: 2020-01-02

## 2020-01-01 RX ORDER — GABAPENTIN 400 MG/1
100 CAPSULE ORAL
Refills: 0 | Status: DISCONTINUED | OUTPATIENT
Start: 2020-01-01 | End: 2020-01-09

## 2020-01-01 RX ORDER — SODIUM CHLORIDE 9 MG/ML
1000 INJECTION INTRAMUSCULAR; INTRAVENOUS; SUBCUTANEOUS
Refills: 0 | Status: DISCONTINUED | OUTPATIENT
Start: 2020-01-01 | End: 2020-01-02

## 2020-01-01 RX ORDER — MEROPENEM 1 G/30ML
500 INJECTION INTRAVENOUS EVERY 24 HOURS
Refills: 0 | Status: COMPLETED | OUTPATIENT
Start: 2020-01-01 | End: 2020-01-07

## 2020-01-01 RX ORDER — VALGANCICLOVIR 450 MG/1
450 TABLET, FILM COATED ORAL
Refills: 0 | Status: DISCONTINUED | OUTPATIENT
Start: 2020-01-01 | End: 2020-01-10

## 2020-01-01 RX ADMIN — Medication 500000 UNIT(S): at 22:50

## 2020-01-01 RX ADMIN — Medication 12.5 MILLIGRAM(S): at 17:30

## 2020-01-01 RX ADMIN — Medication 100 MILLIGRAM(S): at 13:47

## 2020-01-01 RX ADMIN — MYCOPHENOLATE MOFETIL 1000 MILLIGRAM(S): 250 CAPSULE ORAL at 17:30

## 2020-01-01 RX ADMIN — HYDROMORPHONE HYDROCHLORIDE 0.5 MILLIGRAM(S): 2 INJECTION INTRAMUSCULAR; INTRAVENOUS; SUBCUTANEOUS at 11:15

## 2020-01-01 RX ADMIN — FAMOTIDINE 20 MILLIGRAM(S): 10 INJECTION INTRAVENOUS at 13:47

## 2020-01-01 RX ADMIN — GABAPENTIN 100 MILLIGRAM(S): 400 CAPSULE ORAL at 05:31

## 2020-01-01 RX ADMIN — Medication 500000 UNIT(S): at 05:32

## 2020-01-01 RX ADMIN — LATANOPROST 1 DROP(S): 0.05 SOLUTION/ DROPS OPHTHALMIC; TOPICAL at 17:30

## 2020-01-01 RX ADMIN — Medication 10 MILLIGRAM(S): at 13:47

## 2020-01-01 RX ADMIN — SENNA PLUS 2 TABLET(S): 8.6 TABLET ORAL at 22:52

## 2020-01-01 RX ADMIN — MYCOPHENOLATE MOFETIL 1000 MILLIGRAM(S): 250 CAPSULE ORAL at 05:31

## 2020-01-01 RX ADMIN — GABAPENTIN 100 MILLIGRAM(S): 400 CAPSULE ORAL at 17:30

## 2020-01-01 RX ADMIN — Medication 12.5 MILLIGRAM(S): at 05:30

## 2020-01-01 RX ADMIN — BRIMONIDINE TARTRATE 1 DROP(S): 2 SOLUTION/ DROPS OPHTHALMIC at 05:32

## 2020-01-01 RX ADMIN — MEROPENEM 100 MILLIGRAM(S): 1 INJECTION INTRAVENOUS at 21:42

## 2020-01-01 RX ADMIN — BRIMONIDINE TARTRATE 1 DROP(S): 2 SOLUTION/ DROPS OPHTHALMIC at 13:51

## 2020-01-01 RX ADMIN — Medication 500000 UNIT(S): at 17:30

## 2020-01-01 RX ADMIN — Medication 1 TABLET(S): at 13:47

## 2020-01-01 RX ADMIN — BRIMONIDINE TARTRATE 1 DROP(S): 2 SOLUTION/ DROPS OPHTHALMIC at 21:46

## 2020-01-01 RX ADMIN — Medication 10 MILLIGRAM(S): at 05:31

## 2020-01-01 RX ADMIN — HYDROMORPHONE HYDROCHLORIDE 0.5 MILLIGRAM(S): 2 INJECTION INTRAMUSCULAR; INTRAVENOUS; SUBCUTANEOUS at 11:30

## 2020-01-01 RX ADMIN — HYDROMORPHONE HYDROCHLORIDE 30 MILLILITER(S): 2 INJECTION INTRAMUSCULAR; INTRAVENOUS; SUBCUTANEOUS at 11:58

## 2020-01-01 RX ADMIN — VALGANCICLOVIR 450 MILLIGRAM(S): 450 TABLET, FILM COATED ORAL at 05:31

## 2020-01-01 RX ADMIN — VALGANCICLOVIR 450 MILLIGRAM(S): 450 TABLET, FILM COATED ORAL at 13:47

## 2020-01-01 RX ADMIN — Medication 500000 UNIT(S): at 13:47

## 2020-01-01 RX ADMIN — SODIUM CHLORIDE 50 MILLILITER(S): 9 INJECTION INTRAMUSCULAR; INTRAVENOUS; SUBCUTANEOUS at 11:24

## 2020-01-01 NOTE — BRIEF OPERATIVE NOTE - OPERATION/FINDINGS
Turbid perinephritic fluid collection encountered and washed out; intra-op doppler of the kidney revealed good arterial flow. Biopsy of the transplanted kidney done x 2 (superior and inferior)

## 2020-01-01 NOTE — PROGRESS NOTE ADULT - ASSESSMENT
49M PMHx of ESRD on HD  ( via LUE AVF), HTN, Gout, Glaucoma, NET of pancreas (s/p robotic distal panc/splenectomy at [place] 2015 by Dr. Young, followed by Dr. Raphael, [facility] Oncologist), RA with hand contractures. 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 Post op course c/b DGF requiring HD, thrombocytopenia, elevated LDH and Haptoglobulin. Schistocytes  on peripheral smear concerning for TMA. Envarsus held. Received Belatacept 12/13. Started on PLEX (12/12, 12/15). Underwent renal bx on 12/13 c/w profound ATN.  Found to have much improvement to levels, likely related to Tacrolimus. He was discharged home on 12/20/19 w/ outpatient HD; today sent to ED after being found to be febrile to 102 at dialysis center found to have Flu A.     Influenza  - RVP + influenza A  - Tamiflu 30 mg PO after HD x 3 doses completed (12/27, 12/29, 12/31)     s/p DDRT with TMA and now with increased perinephritic fluid collection s/p right groin wash out and biopsy of transplanted kidney (01/01)  - Increase in size of carlos-nephric hematoma noted on ultrasound 12/27 compared to previous.  - No notable improvement in renal graft function following treatment for TMA and discontinuance of CNI.   - Renal bx  12/13 with profound ATN and 2 capillary micro-thrombosis   - Regular diet when awake from anesthesia   - Continue HD TIW for delayed graft function. Tu/Th/Sat schedule  - TMA:  S/P PLEX x2 and CNI changed to Belatacept; follow up new biopsy  -  Immunosuppression: Belatacept (given 12/28), MMF 1 gm BID, Pred 10  - PPX: Nystatin, bactrim QD, Valcyte renally dosed TIW  -Strict I/Os  -SCDs     Bacturia likely related to the infected hematoma/perinephritic collection  - Urine culture > 100,000 CFU gram negative rods, + Enterobactor, continue empirical meropenem   - Perinephritic fluid collection sent for culture, will follow     HTN,  -Well controlled at present  - Continue Metoprolol 12.5 BID   - hold  hydralazine

## 2020-01-01 NOTE — BRIEF OPERATIVE NOTE - SPECIMENS
Perinephritic flood culture, infected hematoma, biopsy of superior and inferior pole of transplanted kidney

## 2020-01-01 NOTE — PROGRESS NOTE ADULT - ATTENDING COMMENTS
washout and biopsy of transplanted kidney done today  continue antibiotics  immunosuppression belatacept and steroids

## 2020-01-01 NOTE — PROGRESS NOTE ADULT - SUBJECTIVE AND OBJECTIVE BOX
Transplant Surgery - Multidisciplinary Rounds  --------------------------------------------------------------  R DDRT    Date:   - Dr. Espinal       Present: Patient seen with multidisciplinary team including Transplant Surgeon: Dr. Cruz,  Transplant Nephrologist: Dr. SHARLENE Puckett, Nurse Practitioner: Aislinn Hunt and surgical resident Maria Alejandra in am rounds and examined with Dr. Cruz. Disciplines not in attendance will be notified of the plan.    Interval events:    - Low grade temperature 38.1 overnight  - Urine culture positive for Enterobactor  - OR this morning with washout and biopsy of the transplanted kidney (see brief op note for more details)   -    + Flu A.   Received #3 of 3 planned Tamiflu doses post-HD (, , ).     Denies cough, SOB or body aches.   -  blood Cx  NGTD  -  renal u/s demonstrated increased perinephric hematoma compared to previous 9.4cm from 5.7. Elevated velocities at the renal artery anastomosis, similar in appearance to 2019. No Tardus parvus. Nonspecific High impedance waveforms throughout the transplant kidney, new since 2019.    HPI: 49M PMHx of ESRD on HD  ( via LUE AVF), HTN, Gout, Glaucoma, NET of pancreas (s/p robotic distal panc/splenectomy at [place]  by Dr. Young, followed by Dr. Raphael, [facility] Oncologist), RA with hand contractures. He underwent DDRT on 19 (ureteral stent sutured to [place] - removed 12/15). Post op course c/b DGF requiring HD, thrombocytopenia, elevated LDH and Haptoglobulin. Schistocytes  on peripheral smear concerning for TMA. Envarsus held. Received Belatacept . Started on PLEX (, 12/15). Underwent renal bx on  c/w profound ATN.  Found to have much improvement to levels, likely related to Tacrolimus. He was discharged home on 19 w/ outpatient HD.     He was now sent to ED by dialysis center after he was found to be febrile to 102, he did not get HD this AM, and last full HD session was on . Upon arrival, he was febrile to 101.7, other vitals were stable. He states that he had a cough that began 5-6 days ago that has now mostly resolved. He reports no other febrile episodes other than this morning. He admits to having one SOB episode earlier this week that resolved after he got HD. He also states that his mother was hospitalized earlier this week and was flu+. Currently makes less than 1 cup urine/daily. He currently denies SOB, CP, dyspnea, HA, dizziness, N/V, diarrhea, constipation.     Recipient info:   CPRA:    0%  ABO:      A+  CMVAb:  Positive  EBVIgG Status:  Positive  Last HD:  2019    Donor ID:  [id number]  Match: [id number]  OPO: NYRT  Age:  49  ABO:  A  KDPI 81%  COD:  Anoxia  X Clamp Time:  2019 1538.  Medical Hx: DM, HTN, HLD, obesity BMI 53, CAD, CABAGX3  Terminal Cr:  1/1.8/1.7  CMV- Neg EBV- Neg    OR:    DDRT to right iliac fossa. Simulect induction. Two arteries anastomosed to a single cuff on the back table. One vein, one ureter. Ureteral anastomosis performed over a double J stent, which was sutured to the martinez.   Cold ischemia time 25.5 hours.    Potential Discharge date: pending clinical improvement    Education:  Medications    Plan of care:  See Below    REVIEW OF SYSTEMS   Gen: No weight changes, fatigue, weakness.   	Skin: No rashes  	Head/Eyes/Ears/Mouth: No headache; Normal hearing; Normal vision w/o blurriness; No sinus pain/discomfort, admits to having a sore throat and cough earlier this week that has now significantly improved.   	Respiratory: Pos occasional cough No dyspnea, wheezing, hemoptysis  	CV: No chest pain, PND, orthopnea  	GI: No abdominal pain, diarrhea, constipation, nausea, vomiting, melena, hematochezia  	: No increased frequency, dysuria, hematuria, nocturia  	MSK: No joint pain/swelling; no back pain; admits to LE edema that he states has improved   	Neuro: No dizziness/lightheadedness, weakness, seizures, numbness, tingling  	Heme: No easy bruising or bleeding  	Endo: No heat/cold intolerance  	Psych: No significant nervousness, anxiety, stress, depression  All other systems were reviewed and are negative, except as noted    MEDICATIONS  (STANDING):  MEDICATIONS  (STANDING):  allopurinol 100 milliGRAM(s) Oral daily  brimonidine 0.2% Ophthalmic Solution 1 Drop(s) Both EYES three times a day  famotidine    Tablet 20 milliGRAM(s) Oral daily  gabapentin 100 milliGRAM(s) Oral two times a day  HYDROmorphone PCA (1 mG/mL) 30 milliLiter(s) PCA Continuous PCA Continuous  latanoprost 0.005% Ophthalmic Solution 1 Drop(s) Both EYES daily  meropenem  IVPB 500 milliGRAM(s) IV Intermittent every 24 hours  metoprolol tartrate 12.5 milliGRAM(s) Oral every 12 hours  mycophenolate mofetil 1000 milliGRAM(s) Oral two times a day  nystatin    Suspension 479017 Unit(s) Oral four times a day  predniSONE   Tablet 10 milliGRAM(s) Oral daily  senna 2 Tablet(s) Oral at bedtime  sodium chloride 0.9%. 1000 milliLiter(s) (50 mL/Hr) IV Continuous <Continuous>  trimethoprim   80 mG/sulfamethoxazole 400 mG 1 Tablet(s) Oral daily  valGANciclovir 450 milliGRAM(s) Oral <User Schedule>    MEDICATIONS  (PRN):  diphenhydrAMINE 25 milliGRAM(s) Oral once PRN Rash and/or Itching  HYDROmorphone  Injectable 0.5 milliGRAM(s) IV Push every 10 minutes PRN Moderate Pain (4 - 6)  HYDROmorphone PCA (1 mG/mL) Rescue Clinician Bolus 0.5 milliGRAM(s) IV Push every 15 minutes PRN for Pain Scale GREATER THAN 6  naloxone Injectable 0.1 milliGRAM(s) IV Push every 3 minutes PRN For ANY of the following changes in patient status:  A. RR LESS THAN 10 breaths per minute, B. Oxygen saturation LESS THAN 90%, C. 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  Carpal tunnel syndrome  Brain cyst: had MRI recently- now gone  Anemia  Gastric ulcer: Long time ago  Rheumatoid arthritis  Renal transplant recipient  Breakdown (mechanical) of penile (implanted) prosthesis, sequela  End-stage renal disease (ESRD): PERMACATH PLACEMENT  Abscess of left buttock: s/p I &amp; D  AV fistula: placement left lower arm  S/P cystoscopy: stent placement &amp; removal for kidney stones, lithiotripsy  s/p Lt Carpal tunnel:     Vital Signs Last 24 Hrs  T(C): 36.7 (2020 10:30), Max: 38.1 (2020 05:00)  T(F): 98.1 (2020 10:30), Max: 100.6 (2020 05:00)  HR: 69 (2020 11:45) (69 - 84)  BP: 136/69 (2020 11:45) (130/70 - 167/79)  BP(mean): 96 (2020 11:45) (96 - 112)  RR: 16 (2020 11:45) (16 - 20)  SpO2: 97% (2020 11:45) (96% - 100%)    I&O's Summary  I&O's Summary    31 Dec 2019 07:  -  2020 07:00  --------------------------------------------------------  IN: 1150 mL / OUT: 1775 mL / NET: -625 mL    Labs:   CBC ( @ 06:20)                          8.9<L>                   8.42    )--------------(  515<H>     --    % Neuts, --    % Lymphs, ANC: --                              28.1<L>  CBC ( @ 06:10)                          8.9<L>                   9.83    )--------------(  486<H>     66.1  % Neuts, 16.5  % Lymphs, ANC: 6.50                            27.5<L>    BMP ( @ 06:20)       135     |  95<L>   |  36<H> 			Ca++ --      Ca 8.9          ---------------------------------( 74    		Mg 2.0          5.2     |  19<L>   |  5.51<H>			Ph 3.3     BMP ( @ 06:10)       134<L>  |  92<L>   |  46<H> 			Ca++ --      Ca 8.8          ---------------------------------( 90    		Mg 2.1          4.0     |  25      |  6.85<H>			Ph 3.1       LFTs ( @ 06:20)      TPro 6.8 / Alb 2.6<L> / TBili 1.1 / DBili 0.4<H> / AST 27 / ALT 14 / AlkPhos 288<H>  LFTs ( @ 06:10)      TPro 7.0 / Alb 3.0<L> / TBili 0.9 / DBili 0.5<H> / AST 21 / ALT 12 / AlkPhos 301<H>    Coags ( @ 06:10)  aPTT 29.8 / INR 1.15 / PT 13.3<H>    Urinalysis ( @ 03:40):     Color: Dark Brown<!> / Appearance: Turbid<!> / S.021 / pH: 7.5 / Gluc: Negative / Ketones: Trace / Bili: Small<!> / Urobili: <2 mg/dL / Protein :100 mg/dL<!> / Nitrites: Negative / Leuk.Est: Large<!> / RBC: >720<H> / WBC: >720<H> / Sq Epi:  / Non Sq Epi: 8<H> / Bacteria Negative       -> .Urine Clean Catch (Midstream) Culture ( @ 22:02)     NG    Enterobacter cloacae    >100,000 CFU/ml Enterobacter cloacae    -> .Blood Blood-Peripheral Culture ( @ 13:12)     NG    NG    No growth to date.    PHYSICAL EXAM:  Constitutional: Well developed / well nourished  Eyes: Anicteric, PERRLA  ENMT: nc/at   Neck: Supple  Respiratory: CTA b/l    Cardiovascular: RRR  Gastrointestinal: Soft abdomen, NT, ND.   Genitourinary:  Voiding spontaneously  Extremities: SCD's in place and working bilaterally  Vascular: faintly palpable pulses b/l LE; 2+ pitting edema.   Neurological: A&O x3  Skin: moderate strike through on the dressing, BO serosanguinous   Musculoskeletal: Moving all extremities Psychiatric: Responsive

## 2020-01-02 ENCOUNTER — APPOINTMENT (OUTPATIENT)
Dept: TRANSPLANT | Facility: CLINIC | Age: 50
End: 2020-01-02

## 2020-01-02 LAB
ALBUMIN SERPL ELPH-MCNC: 3.1 G/DL — LOW (ref 3.3–5)
ALP SERPL-CCNC: 249 U/L — HIGH (ref 40–120)
ALT FLD-CCNC: 7 U/L — LOW (ref 10–45)
ANION GAP SERPL CALC-SCNC: 14 MMOL/L — SIGNIFICANT CHANGE UP (ref 5–17)
AST SERPL-CCNC: 9 U/L — LOW (ref 10–40)
BASOPHILS # BLD AUTO: 0 K/UL — SIGNIFICANT CHANGE UP (ref 0–0.2)
BASOPHILS NFR BLD AUTO: 0 % — SIGNIFICANT CHANGE UP (ref 0–2)
BILIRUB DIRECT SERPL-MCNC: 0.4 MG/DL — HIGH (ref 0–0.2)
BILIRUB INDIRECT FLD-MCNC: 0.2 MG/DL — SIGNIFICANT CHANGE UP (ref 0.2–1)
BILIRUB SERPL-MCNC: 0.6 MG/DL — SIGNIFICANT CHANGE UP (ref 0.2–1.2)
BUN SERPL-MCNC: 49 MG/DL — HIGH (ref 7–23)
CALCIUM SERPL-MCNC: 8.9 MG/DL — SIGNIFICANT CHANGE UP (ref 8.4–10.5)
CHLORIDE SERPL-SCNC: 97 MMOL/L — SIGNIFICANT CHANGE UP (ref 96–108)
CO2 SERPL-SCNC: 25 MMOL/L — SIGNIFICANT CHANGE UP (ref 22–31)
CREAT SERPL-MCNC: 6.88 MG/DL — HIGH (ref 0.5–1.3)
EOSINOPHIL # BLD AUTO: 0 K/UL — SIGNIFICANT CHANGE UP (ref 0–0.5)
EOSINOPHIL NFR BLD AUTO: 0 % — SIGNIFICANT CHANGE UP (ref 0–6)
GLUCOSE SERPL-MCNC: 152 MG/DL — HIGH (ref 70–99)
GRAM STN FLD: SIGNIFICANT CHANGE UP
GRAM STN FLD: SIGNIFICANT CHANGE UP
HCT VFR BLD CALC: 26.3 % — LOW (ref 39–50)
HGB BLD-MCNC: 8.3 G/DL — LOW (ref 13–17)
LYMPHOCYTES # BLD AUTO: 1.84 K/UL — SIGNIFICANT CHANGE UP (ref 1–3.3)
LYMPHOCYTES # BLD AUTO: 15.3 % — SIGNIFICANT CHANGE UP (ref 13–44)
MAGNESIUM SERPL-MCNC: 2.2 MG/DL — SIGNIFICANT CHANGE UP (ref 1.6–2.6)
MCHC RBC-ENTMCNC: 30.4 PG — SIGNIFICANT CHANGE UP (ref 27–34)
MCHC RBC-ENTMCNC: 31.6 GM/DL — LOW (ref 32–36)
MCV RBC AUTO: 96.3 FL — SIGNIFICANT CHANGE UP (ref 80–100)
MONOCYTES # BLD AUTO: 0.97 K/UL — HIGH (ref 0–0.9)
MONOCYTES NFR BLD AUTO: 8.1 % — SIGNIFICANT CHANGE UP (ref 2–14)
NEUTROPHILS # BLD AUTO: 9.2 K/UL — HIGH (ref 1.8–7.4)
NEUTROPHILS NFR BLD AUTO: 76.6 % — SIGNIFICANT CHANGE UP (ref 43–77)
PHOSPHATE SERPL-MCNC: 5.4 MG/DL — HIGH (ref 2.5–4.5)
PLATELET # BLD AUTO: 571 K/UL — HIGH (ref 150–400)
POTASSIUM SERPL-MCNC: 5.9 MMOL/L — HIGH (ref 3.5–5.3)
POTASSIUM SERPL-SCNC: 5.9 MMOL/L — HIGH (ref 3.5–5.3)
PROT SERPL-MCNC: 7 G/DL — SIGNIFICANT CHANGE UP (ref 6–8.3)
RBC # BLD: 2.73 M/UL — LOW (ref 4.2–5.8)
RBC # FLD: 17.6 % — HIGH (ref 10.3–14.5)
SODIUM SERPL-SCNC: 136 MMOL/L — SIGNIFICANT CHANGE UP (ref 135–145)
SPECIMEN SOURCE: SIGNIFICANT CHANGE UP
WBC # BLD: 12.01 K/UL — HIGH (ref 3.8–10.5)
WBC # FLD AUTO: 12.01 K/UL — HIGH (ref 3.8–10.5)

## 2020-01-02 PROCEDURE — 99232 SBSQ HOSP IP/OBS MODERATE 35: CPT

## 2020-01-02 PROCEDURE — 99232 SBSQ HOSP IP/OBS MODERATE 35: CPT | Mod: GC,24

## 2020-01-02 RX ORDER — ACETAMINOPHEN 500 MG
650 TABLET ORAL EVERY 6 HOURS
Refills: 0 | Status: DISCONTINUED | OUTPATIENT
Start: 2020-01-02 | End: 2020-01-10

## 2020-01-02 RX ORDER — TRAMADOL HYDROCHLORIDE 50 MG/1
25 TABLET ORAL EVERY 4 HOURS
Refills: 0 | Status: DISCONTINUED | OUTPATIENT
Start: 2020-01-02 | End: 2020-01-02

## 2020-01-02 RX ORDER — TRAMADOL HYDROCHLORIDE 50 MG/1
25 TABLET ORAL EVERY 6 HOURS
Refills: 0 | Status: DISCONTINUED | OUTPATIENT
Start: 2020-01-02 | End: 2020-01-06

## 2020-01-02 RX ORDER — TRAMADOL HYDROCHLORIDE 50 MG/1
50 TABLET ORAL EVERY 6 HOURS
Refills: 0 | Status: DISCONTINUED | OUTPATIENT
Start: 2020-01-02 | End: 2020-01-09

## 2020-01-02 RX ADMIN — MYCOPHENOLATE MOFETIL 1000 MILLIGRAM(S): 250 CAPSULE ORAL at 17:22

## 2020-01-02 RX ADMIN — GABAPENTIN 100 MILLIGRAM(S): 400 CAPSULE ORAL at 06:25

## 2020-01-02 RX ADMIN — TRAMADOL HYDROCHLORIDE 50 MILLIGRAM(S): 50 TABLET ORAL at 17:21

## 2020-01-02 RX ADMIN — TRAMADOL HYDROCHLORIDE 50 MILLIGRAM(S): 50 TABLET ORAL at 22:50

## 2020-01-02 RX ADMIN — Medication 500000 UNIT(S): at 13:12

## 2020-01-02 RX ADMIN — Medication 1 TABLET(S): at 13:12

## 2020-01-02 RX ADMIN — Medication 12.5 MILLIGRAM(S): at 21:28

## 2020-01-02 RX ADMIN — BRIMONIDINE TARTRATE 1 DROP(S): 2 SOLUTION/ DROPS OPHTHALMIC at 06:25

## 2020-01-02 RX ADMIN — MEROPENEM 100 MILLIGRAM(S): 1 INJECTION INTRAVENOUS at 21:28

## 2020-01-02 RX ADMIN — GABAPENTIN 100 MILLIGRAM(S): 400 CAPSULE ORAL at 17:22

## 2020-01-02 RX ADMIN — HYDROMORPHONE HYDROCHLORIDE 30 MILLILITER(S): 2 INJECTION INTRAMUSCULAR; INTRAVENOUS; SUBCUTANEOUS at 07:44

## 2020-01-02 RX ADMIN — Medication 10 MILLIGRAM(S): at 06:28

## 2020-01-02 RX ADMIN — TRAMADOL HYDROCHLORIDE 50 MILLIGRAM(S): 50 TABLET ORAL at 16:26

## 2020-01-02 RX ADMIN — LATANOPROST 1 DROP(S): 0.05 SOLUTION/ DROPS OPHTHALMIC; TOPICAL at 13:12

## 2020-01-02 RX ADMIN — BRIMONIDINE TARTRATE 1 DROP(S): 2 SOLUTION/ DROPS OPHTHALMIC at 21:28

## 2020-01-02 RX ADMIN — Medication 12.5 MILLIGRAM(S): at 06:25

## 2020-01-02 RX ADMIN — MYCOPHENOLATE MOFETIL 1000 MILLIGRAM(S): 250 CAPSULE ORAL at 06:26

## 2020-01-02 RX ADMIN — TRAMADOL HYDROCHLORIDE 50 MILLIGRAM(S): 50 TABLET ORAL at 11:00

## 2020-01-02 RX ADMIN — BRIMONIDINE TARTRATE 1 DROP(S): 2 SOLUTION/ DROPS OPHTHALMIC at 13:12

## 2020-01-02 RX ADMIN — FAMOTIDINE 20 MILLIGRAM(S): 10 INJECTION INTRAVENOUS at 13:11

## 2020-01-02 RX ADMIN — TRAMADOL HYDROCHLORIDE 50 MILLIGRAM(S): 50 TABLET ORAL at 10:06

## 2020-01-02 RX ADMIN — Medication 500000 UNIT(S): at 17:22

## 2020-01-02 RX ADMIN — Medication 100 MILLIGRAM(S): at 13:12

## 2020-01-02 RX ADMIN — Medication 500000 UNIT(S): at 06:26

## 2020-01-02 RX ADMIN — TRAMADOL HYDROCHLORIDE 50 MILLIGRAM(S): 50 TABLET ORAL at 22:14

## 2020-01-02 NOTE — PROGRESS NOTE ADULT - PROBLEM SELECTOR PLAN 1
s/p DDRT 12/8/19 c/b Delayed graft function in setting ATN/focal TMA (? tacrolimus) requiring HD, PLEX 12/12 and plasmapheresis 12/14, switch tacrolimus to belatacept    Biopsy in OR yesterday revealed ATN, no evidence of rejection.  Had infected collection washed out, cont meropenem.

## 2020-01-02 NOTE — PROGRESS NOTE ADULT - SUBJECTIVE AND OBJECTIVE BOX
Transplant Surgery - Multidisciplinary Rounds  --------------------------------------------------------------  R DDRT    Date:  12/8 - Dr. Espinal        Patient seen with multidisciplinary team including Transplant Surgeon: Dr. Cruz, transplant Nephrologist: Dr. SHARLENE Puckett. Pharmacist Debbi Ortiz, NP: MAEVE Proctor and surgical resident Maria Alejandra in am rounds and examined with Dr. Cruz. Disciplines not in attendance will be notified of the plan.     HPI: 49M PMHx of ESRD on HD  ( via LUE AVF), HTN, Gout, Glaucoma, NET of pancreas (s/p robotic distal panc/splenectomy at [place] 2015 by Dr. Young, followed by Dr. Raphael, [facility] Oncologist), RA with hand contractures. He underwent DDRT on 12/8/19 (ureteral stent sutured to [place] - removed 12/15). Post op course c/b DGF requiring HD, thrombocytopenia, elevated LDH and Haptoglobulin. Schistocytes  on peripheral smear concerning for TMA. Envarsus held. Received Belatacept 12/13. Started on PLEX (12/12, 12/15). Underwent renal bx on 12/13 c/w profound ATN.  Found to have much improvement to levels, likely related to Tacrolimus. He was discharged home on 12/20/19 w/ outpatient HD.     He was now sent to ED by dialysis center after he was found to be febrile to 102, he did not get HD this AM, and last full HD session was on 12/26. Upon arrival, he was febrile to 101.7, other vitals were stable. He states that he had a cough that began 5-6 days ago that has now mostly resolved. He reports no other febrile episodes other than this morning. He admits to having one SOB episode earlier this week that resolved after he got HD. He also states that his mother was hospitalized earlier this week and was flu+. Currently makes less than 1 cup urine/daily. He currently denies SOB, CP, dyspnea, HA, dizziness, N/V, diarrhea, constipation.     Recipient info:   CPRA:    0%  ABO:      A+  CMVAb:  Positive  EBVIgG Status:  Positive  Last HD:  12/7/2019    Donor ID:  [id number]  Match: [id number]  OPO: NYRT  Age:  49  ABO:  A  KDPI 81%  COD:  Anoxia  X Clamp Time:  12/7/2019 1538.  Medical Hx: DM, HTN, HLD, obesity BMI 53, CAD, CABAGX3  Terminal Cr:  1/1.8/1.7  CMV- Neg EBV- Neg    OR:    DDRT to right iliac fossa. Simulect induction. Two arteries anastomosed to a single cuff on the back table. One vein, one ureter. Ureteral anastomosis performed over a double J stent, which was sutured to the martinez.   Cold ischemia time 25.5 hours.    Interval events:    - 12/27   + Flu A.   Received #3 of 3 planned Tamiflu doses post-HD (12/27, 12/29, 12/31).       -Afebrile, vitals stable.   -s/p OR w/ washout and biopsy of the transplanted kidney yesterday.   -OR fluid cultures growing GNR, remains on meropenem.  - 12/29 Urine culture positive for Enterobactor; 12/27 blood cx NGTD  -IVF 50cc/hr  -c/o incisional pain this AM. PCA dced this AM, started on tramadol    Potential Discharge date: pending clinical improvement      MEDICATIONS  (STANDING):  allopurinol 100 milliGRAM(s) Oral daily  brimonidine 0.2% Ophthalmic Solution 1 Drop(s) Both EYES three times a day  famotidine    Tablet 20 milliGRAM(s) Oral daily  gabapentin 100 milliGRAM(s) Oral two times a day  latanoprost 0.005% Ophthalmic Solution 1 Drop(s) Both EYES daily  meropenem  IVPB 500 milliGRAM(s) IV Intermittent every 24 hours  metoprolol tartrate 12.5 milliGRAM(s) Oral every 12 hours  mycophenolate mofetil 1000 milliGRAM(s) Oral two times a day  nystatin    Suspension 919192 Unit(s) Oral four times a day  predniSONE   Tablet 10 milliGRAM(s) Oral daily  senna 2 Tablet(s) Oral at bedtime  trimethoprim   80 mG/sulfamethoxazole 400 mG 1 Tablet(s) Oral daily  valGANciclovir 450 milliGRAM(s) Oral <User Schedule>    MEDICATIONS  (PRN):  acetaminophen   Tablet .. 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  Carpal tunnel syndrome  Brain cyst: had MRI recently- now gone  Anemia  Gastric ulcer: Long time ago  Rheumatoid arthritis  Renal transplant recipient  Breakdown (mechanical) of penile (implanted) prosthesis, sequela  End-stage renal disease (ESRD): PERMACATH PLACEMENT  Abscess of left buttock: s/p I &amp; D  AV fistula: placement left lower arm  S/P cystoscopy: stent placement &amp; removal for kidney stones, lithiotripsy  s/p Lt Carpal tunnel: 2011      Vital Signs Last 24 Hrs  T(C): 36.4 (02 Jan 2020 09:00), Max: 37.1 (01 Jan 2020 13:30)  T(F): 97.6 (02 Jan 2020 09:00), Max: 98.8 (01 Jan 2020 13:30)  HR: 60 (02 Jan 2020 09:00) (58 - 79)  BP: 114/72 (02 Jan 2020 09:00) (114/65 - 160/83)  BP(mean): 96 (01 Jan 2020 11:45) (96 - 103)  RR: 18 (02 Jan 2020 09:00) (16 - 20)  SpO2: 99% (02 Jan 2020 09:00) (97% - 100%)    I&O's Summary    01 Jan 2020 07:01  -  02 Jan 2020 07:00  --------------------------------------------------------  IN: 1400 mL / OUT: 265 mL / NET: 1135 mL                   8.3    12.01 )-----------( 571      ( 02 Jan 2020 06:33 )             26.3     01-02    136  |  97  |  49<H>  ----------------------------<  152<H>  5.9<H>   |  25  |  6.88<H>    Ca    8.9      02 Jan 2020 06:33  Phos  5.4     01-02  Mg     2.2     01-02    TPro  7.0  /  Alb  3.1<L>  /  TBili  0.6  /  DBili  0.4<H>  /  AST  9<L>  /  ALT  7<L>  /  AlkPhos  249<H>  01-02          Culture - Tissue with Gram Stain (collected 01-01-20 @ 17:12)  Source: .Tissue Other  Gram Stain (01-02-20 @ 00:25):    Rare polymorphonuclear leukocytes seen per low power field    Rare Gram Negative Rods seen per oil power field    Culture - Surgical Swab (collected 01-01-20 @ 17:06)  Source: .Surgical Swab collection around right kidney  Preliminary Report (01-02-20 @ 09:19):    Moderate Gram Negative Rods    Culture - Body Fluid with Gram Stain (collected 01-01-20 @ 16:59)  Source: .Body Fluid periphrenic collection  Gram Stain (01-02-20 @ 09:22):    Upon re-evaluation of gram stain: Rare Gram Negative Rods seen    No polymorphonuclear cells seen    by cytocentrifuge  Preliminary Report (01-02-20 @ 09:25):    Moderate Gram Negative Rods    Culture - Surgical Swab (collected 01-01-20 @ 16:51)  Source: .Surgical Swab right kidney abscess  Preliminary Report (01-02-20 @ 10:01):    Moderate Gram Negative Rods    Culture - Urine (collected 12-29-19 @ 22:02)  Source: .Urine Clean Catch (Midstream)  Final Report (12-31-19 @ 16:32):    >100,000 CFU/ml Enterobacter cloacae  Organism: Enterobacter cloacae (12-31-19 @ 16:32)  Organism: Enterobacter cloacae (12-31-19 @ 16:32)    Culture - Blood (collected 12-27-19 @ 13:12)  Source: .Blood Blood-Peripheral  Final Report (01-01-20 @ 14:01):    No growth at 5 days.    Culture - Blood (collected 12-27-19 @ 13:12)  Source: .Blood Blood-Peripheral  Final Report (01-01-20 @ 14:01):    No growth at 5 days.        REVIEW OF SYSTEMS  Gen: No weight changes, fatigue, fevers/chills, weakness  Skin: No rashes  Head/Eyes/Ears/Mouth: No headache; Normal hearing; Normal vision w/o blurriness; No sinus pain/discomfort, sore throat  Respiratory: No dyspnea, cough, wheezing, hemoptysis  CV: No chest pain, PND, orthopnea  GI: c/o incisional pain this AM. No diarrhea, constipation, nausea, vomiting, melena, hematochezia  : No increased frequency, dysuria, hematuria, nocturia  MSK: No joint pain/swelling; no back pain; no edema  Neuro: No dizziness/lightheadedness, weakness, seizures, numbness, tingling  Heme: No easy bruising or bleeding  Endo: No heat/cold intolerance  Psych: No significant nervousness, anxiety, stress, depression      PHYSICAL EXAM:  Constitutional: Well developed / well nourished  Eyes: Anicteric, PERRLA  ENMT: nc/at   Neck: Supple  Respiratory: CTA b/l    Cardiovascular: RRR  Gastrointestinal: Soft abdomen, NT, ND. R sided surgical dressing partially saturated w/ bloody drainage. Dressing removed, no oozing seen from wound. New dressing applied.   Genitourinary:  Voiding spontaneously  Extremities: SCD's in place and working bilaterally  Vascular: faintly palpable pulses b/l LE; 2+ pitting edema.   Neurological: A&O x3  Skin: moderate strike through on the dressing, BO serosanguinous   Musculoskeletal: Moving all extremities Psychiatric: Responsive

## 2020-01-02 NOTE — PROGRESS NOTE ADULT - PROBLEM SELECTOR PLAN 2
Lot # (Optional): [id number] Lot # (Optional): [id number] s/p belatacept 12/28  - next dose belatacept 01/06/2020  - continue MMF and prednisone.     continue ppx: Nystatin, bactrim, Valcyte renally dosed TIW, pepcid

## 2020-01-02 NOTE — PROGRESS NOTE ADULT - ATTENDING COMMENTS
agree with above  remains anuric/oliguric  awaiting pathlogy report  continue antibiotics  immunosuppression belatacept and steroids and low dose mmf

## 2020-01-02 NOTE — PROGRESS NOTE ADULT - ASSESSMENT
49M PMHx of ESRD on HD  ( via LUE AVF), HTN, Gout, Glaucoma, NET of pancreas (s/p robotic distal panc/splenectomy at [place] 2015 by Dr. Young, followed by Dr. Raphael, [facility] Oncologist), RA with hand contractures. 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 Post op course c/b DGF requiring HD, thrombocytopenia, elevated LDH and Haptoglobulin. Schistocytes  on peripheral smear concerning for TMA. Envarsus held. Received Belatacept 12/13. Started on PLEX (12/12, 12/15). Underwent renal bx on 12/13 c/w profound ATN.  Found to have much improvement to levels, likely related to Tacrolimus. He was discharged home on 12/20/19 w/ outpatient HD; today sent to ED after being found to be febrile to 102 at dialysis center found to have Flu A.     Influenza  - RVP + influenza A  - Tamiflu 30 mg PO after HD x 3 doses completed (12/27, 12/29, 12/31)     s/p DDRT with TMA and now with increased perinephritic fluid collection s/p right groin wash out and biopsy of transplanted kidney (1/1)  - Increase in size of carlos-nephric hematoma noted on ultrasound 12/27 compared to previous.  - No notable improvement in renal graft function following treatment for TMA and discontinuance of CNI.   - Renal bx  12/13 with profound ATN and 2 capillary micro-thrombosis   - Regular diet   - Continue HD TIW for delayed graft function. Tu/Th/Sat schedule  - TMA:  S/P PLEX x2 and CNI changed to Belatacept; follow up 1/1 biopsy  -  Immunosuppression: Belatacept (given 12/28), next dose due 1/6. MMF 1 gm BID, Pred 10  - PPX: Nystatin, bactrim QD, Valcyte renally dosed TIW  -Strict I/Os  -SCDs     Bacturia likely related to the infected hematoma/perinephritic collection  - Urine culture > 100,000 CFU gram negative rods, + Enterobactor, continue empirical meropenem   - Perinephritic fluid collection: growing GNRs. F/u final.    HTN,  -Well controlled at present  - Continue Metoprolol 12.5 BID   - hold  hydralazine

## 2020-01-02 NOTE — PROGRESS NOTE ADULT - SUBJECTIVE AND OBJECTIVE BOX
UO poor, fever resolved, s/p HD this am    Vital Signs Last 24 Hrs  T(C): 36.6 (01-02-20 @ 12:35), Max: 36.6 (01-02-20 @ 12:35)  T(F): 97.8 (01-02-20 @ 12:35), Max: 97.8 (01-02-20 @ 12:35)  HR: 64 (01-02-20 @ 12:35) (58 - 64)  BP: 152/89 (01-02-20 @ 12:35) (114/65 - 160/83)  RR: 18 (01-02-20 @ 12:35) (18 - 18)  SpO2: 99% (01-02-20 @ 12:35) (98% - 99%)    Card S1S2  Lungs b/l air entry  Abd soft, ND  Extr + edema, AVF patent                                                          8.3    12.01 )-----------( 571      ( 02 Jan 2020 06:33 )             26.3     02 Jan 2020 06:33    136    |  97     |  49     ----------------------------<  152    5.9     |  25     |  6.88     Ca    8.9        02 Jan 2020 06:33  Phos  5.4       02 Jan 2020 06:33  Mg     2.2       02 Jan 2020 06:33    TPro  7.0    /  Alb  3.1    /  TBili  0.6    /  DBili  0.4    /  AST  9      /  ALT  7      /  AlkPhos  249    02 Jan 2020 06:33    LIVER FUNCTIONS - ( 02 Jan 2020 06:33 )  Alb: 3.1 g/dL / Pro: 7.0 g/dL / ALK PHOS: 249 U/L / ALT: 7 U/L / AST: 9 U/L / GGT: x           Culture - Tissue with Gram Stain (collected 01 Jan 2020 17:12)  Source: .Tissue Other  Gram Stain (02 Jan 2020 00:25):    Rare polymorphonuclear leukocytes seen per low power field    Rare Gram Negative Rods seen per oil power field  Preliminary Report (02 Jan 2020 13:15):    Numerous Enterobacter cloacae complex    Culture - Surgical Swab (collected 01 Jan 2020 17:06)  Source: .Surgical Swab collection around right kidney  Preliminary Report (02 Jan 2020 13:16):    Moderate Enterobacter cloacae complex    Culture - Body Fluid with Gram Stain (collected 01 Jan 2020 16:59)  Source: .Body Fluid periphrenic collection  Gram Stain (02 Jan 2020 09:22):    Upon re-evaluation of gram stain: Rare Gram Negative Rods seen    No polymorphonuclear cells seen    by cytocentrifuge  Preliminary Report (02 Jan 2020 09:25):    Moderate Gram Negative Rods    Culture - Surgical Swab (collected 01 Jan 2020 16:51)  Source: .Surgical Swab right kidney abscess  Preliminary Report (02 Jan 2020 13:17):    Moderate Enterobacter cloacae complex    acetaminophen   Tablet .. 650 milliGRAM(s) Oral every 6 hours PRN  allopurinol 100 milliGRAM(s) Oral daily  brimonidine 0.2% Ophthalmic Solution 1 Drop(s) Both EYES three times a day  diphenhydrAMINE 25 milliGRAM(s) Oral once PRN  famotidine    Tablet 20 milliGRAM(s) Oral daily  gabapentin 100 milliGRAM(s) Oral two times a day  latanoprost 0.005% Ophthalmic Solution 1 Drop(s) Both EYES daily  meropenem  IVPB 500 milliGRAM(s) IV Intermittent every 24 hours  metoprolol tartrate 12.5 milliGRAM(s) Oral every 12 hours  mycophenolate mofetil 1000 milliGRAM(s) Oral two times a day  nystatin    Suspension 043829 Unit(s) Oral four times a day  predniSONE   Tablet 10 milliGRAM(s) Oral daily  senna 2 Tablet(s) Oral at bedtime  traMADol 50 milliGRAM(s) Oral every 6 hours PRN  traMADol 25 milliGRAM(s) Oral every 6 hours PRN  trimethoprim   80 mG/sulfamethoxazole 400 mG 1 Tablet(s) Oral daily  valGANciclovir 450 milliGRAM(s) Oral <User Schedule>    A/P:    Flu A  ESRD on HD  S/p DDRT 12/8/2019  DGF  S/p transplant kidney bx 12/13: ATN, focal TMA  S/p PLEX 12/13 and 12/15  Carlos-nephric hematoma noted on ultrasound 12/27, increased from prior  S/p OR 1/1 for washout and repeat renal graft biopsy  Infected carlos-nephric hematoma  Abx per ID  S/p HD today  Next HD Sat  Heparin free  TMP as able avoiding hypotension during HD

## 2020-01-02 NOTE — PROGRESS NOTE ADULT - ASSESSMENT
48 yo M with hx of ESRD on HD, s/p DDRT on 12/8, elevated SCr on HD using AVF, complicated by TMA related to CNI, now on belatacept, admitted with influenza.   Pt s/p washout of infected collection and biopsy which revealed ATN.

## 2020-01-03 LAB
-  AMIKACIN: SIGNIFICANT CHANGE UP
-  AMOXICILLIN/CLAVULANIC ACID: SIGNIFICANT CHANGE UP
-  AMPICILLIN/SULBACTAM: SIGNIFICANT CHANGE UP
-  AMPICILLIN: SIGNIFICANT CHANGE UP
-  AZTREONAM: SIGNIFICANT CHANGE UP
-  CEFAZOLIN: SIGNIFICANT CHANGE UP
-  CEFEPIME: SIGNIFICANT CHANGE UP
-  CEFOXITIN: SIGNIFICANT CHANGE UP
-  CEFTAZIDIME/AVIBACTAM: SIGNIFICANT CHANGE UP
-  CEFTAZIDIME: SIGNIFICANT CHANGE UP
-  CEFTAZIDIME: SIGNIFICANT CHANGE UP
-  CEFTOLOZANE/TAZOBACTAM: SIGNIFICANT CHANGE UP
-  CEFTRIAXONE: SIGNIFICANT CHANGE UP
-  CEFUROXIME: SIGNIFICANT CHANGE UP
-  CEFUROXIME: SIGNIFICANT CHANGE UP
-  CIPROFLOXACIN: SIGNIFICANT CHANGE UP
-  ERTAPENEM: SIGNIFICANT CHANGE UP
-  GENTAMICIN: SIGNIFICANT CHANGE UP
-  IMIPENEM: SIGNIFICANT CHANGE UP
-  LEVOFLOXACIN: SIGNIFICANT CHANGE UP
-  MEROPENEM: SIGNIFICANT CHANGE UP
-  MINOCYCLINE: SIGNIFICANT CHANGE UP
-  MINOCYCLINE: SIGNIFICANT CHANGE UP
-  PIPERACILLIN/TAZOBACTAM: SIGNIFICANT CHANGE UP
-  TIGECYCLINE: SIGNIFICANT CHANGE UP
-  TIGECYCLINE: SIGNIFICANT CHANGE UP
-  TOBRAMYCIN: SIGNIFICANT CHANGE UP
-  TRIMETHOPRIM/SULFAMETHOXAZOLE: SIGNIFICANT CHANGE UP
ALBUMIN SERPL ELPH-MCNC: 3.1 G/DL — LOW (ref 3.3–5)
ALP SERPL-CCNC: 233 U/L — HIGH (ref 40–120)
ALT FLD-CCNC: 5 U/L — LOW (ref 10–45)
ANION GAP SERPL CALC-SCNC: 17 MMOL/L — SIGNIFICANT CHANGE UP (ref 5–17)
AST SERPL-CCNC: 9 U/L — LOW (ref 10–40)
BASOPHILS # BLD AUTO: 0.12 K/UL — SIGNIFICANT CHANGE UP (ref 0–0.2)
BASOPHILS NFR BLD AUTO: 0.9 % — SIGNIFICANT CHANGE UP (ref 0–2)
BILIRUB DIRECT SERPL-MCNC: 0.3 MG/DL — HIGH (ref 0–0.2)
BILIRUB INDIRECT FLD-MCNC: 0.3 MG/DL — SIGNIFICANT CHANGE UP (ref 0.2–1)
BILIRUB SERPL-MCNC: 0.6 MG/DL — SIGNIFICANT CHANGE UP (ref 0.2–1.2)
BUN SERPL-MCNC: 29 MG/DL — HIGH (ref 7–23)
CALCIUM SERPL-MCNC: 8.8 MG/DL — SIGNIFICANT CHANGE UP (ref 8.4–10.5)
CHLORIDE SERPL-SCNC: 91 MMOL/L — LOW (ref 96–108)
CO2 SERPL-SCNC: 28 MMOL/L — SIGNIFICANT CHANGE UP (ref 22–31)
CREAT SERPL-MCNC: 4.8 MG/DL — HIGH (ref 0.5–1.3)
EOSINOPHIL # BLD AUTO: 0.69 K/UL — HIGH (ref 0–0.5)
EOSINOPHIL NFR BLD AUTO: 5.3 % — SIGNIFICANT CHANGE UP (ref 0–6)
GLUCOSE SERPL-MCNC: 101 MG/DL — HIGH (ref 70–99)
HCT VFR BLD CALC: 26.9 % — LOW (ref 39–50)
HGB BLD-MCNC: 8.6 G/DL — LOW (ref 13–17)
LYMPHOCYTES # BLD AUTO: 1.37 K/UL — SIGNIFICANT CHANGE UP (ref 1–3.3)
LYMPHOCYTES # BLD AUTO: 10.5 % — LOW (ref 13–44)
MAGNESIUM SERPL-MCNC: 2.1 MG/DL — SIGNIFICANT CHANGE UP (ref 1.6–2.6)
MCHC RBC-ENTMCNC: 30.6 PG — SIGNIFICANT CHANGE UP (ref 27–34)
MCHC RBC-ENTMCNC: 32 GM/DL — SIGNIFICANT CHANGE UP (ref 32–36)
MCV RBC AUTO: 95.7 FL — SIGNIFICANT CHANGE UP (ref 80–100)
METHOD TYPE: SIGNIFICANT CHANGE UP
MONOCYTES # BLD AUTO: 1.03 K/UL — HIGH (ref 0–0.9)
MONOCYTES NFR BLD AUTO: 7.9 % — SIGNIFICANT CHANGE UP (ref 2–14)
NEUTROPHILS # BLD AUTO: 9.49 K/UL — HIGH (ref 1.8–7.4)
NEUTROPHILS NFR BLD AUTO: 72.8 % — SIGNIFICANT CHANGE UP (ref 43–77)
PHOSPHATE SERPL-MCNC: 4.1 MG/DL — SIGNIFICANT CHANGE UP (ref 2.5–4.5)
PLATELET # BLD AUTO: 620 K/UL — HIGH (ref 150–400)
POTASSIUM SERPL-MCNC: 4.3 MMOL/L — SIGNIFICANT CHANGE UP (ref 3.5–5.3)
POTASSIUM SERPL-SCNC: 4.3 MMOL/L — SIGNIFICANT CHANGE UP (ref 3.5–5.3)
PROT SERPL-MCNC: 6.9 G/DL — SIGNIFICANT CHANGE UP (ref 6–8.3)
RBC # BLD: 2.81 M/UL — LOW (ref 4.2–5.8)
RBC # FLD: 17.3 % — HIGH (ref 10.3–14.5)
SODIUM SERPL-SCNC: 136 MMOL/L — SIGNIFICANT CHANGE UP (ref 135–145)
WBC # BLD: 13.04 K/UL — HIGH (ref 3.8–10.5)
WBC # FLD AUTO: 13.04 K/UL — HIGH (ref 3.8–10.5)

## 2020-01-03 PROCEDURE — 99232 SBSQ HOSP IP/OBS MODERATE 35: CPT | Mod: GC

## 2020-01-03 PROCEDURE — 99232 SBSQ HOSP IP/OBS MODERATE 35: CPT

## 2020-01-03 RX ADMIN — TRAMADOL HYDROCHLORIDE 50 MILLIGRAM(S): 50 TABLET ORAL at 12:50

## 2020-01-03 RX ADMIN — Medication 12.5 MILLIGRAM(S): at 05:52

## 2020-01-03 RX ADMIN — Medication 500000 UNIT(S): at 05:52

## 2020-01-03 RX ADMIN — Medication 1 TABLET(S): at 11:45

## 2020-01-03 RX ADMIN — BRIMONIDINE TARTRATE 1 DROP(S): 2 SOLUTION/ DROPS OPHTHALMIC at 05:53

## 2020-01-03 RX ADMIN — Medication 500000 UNIT(S): at 00:50

## 2020-01-03 RX ADMIN — GABAPENTIN 100 MILLIGRAM(S): 400 CAPSULE ORAL at 05:52

## 2020-01-03 RX ADMIN — VALGANCICLOVIR 450 MILLIGRAM(S): 450 TABLET, FILM COATED ORAL at 05:52

## 2020-01-03 RX ADMIN — MYCOPHENOLATE MOFETIL 1000 MILLIGRAM(S): 250 CAPSULE ORAL at 17:35

## 2020-01-03 RX ADMIN — MYCOPHENOLATE MOFETIL 1000 MILLIGRAM(S): 250 CAPSULE ORAL at 05:52

## 2020-01-03 RX ADMIN — TRAMADOL HYDROCHLORIDE 50 MILLIGRAM(S): 50 TABLET ORAL at 06:45

## 2020-01-03 RX ADMIN — FAMOTIDINE 20 MILLIGRAM(S): 10 INJECTION INTRAVENOUS at 11:45

## 2020-01-03 RX ADMIN — Medication 500000 UNIT(S): at 11:46

## 2020-01-03 RX ADMIN — Medication 12.5 MILLIGRAM(S): at 17:35

## 2020-01-03 RX ADMIN — MEROPENEM 100 MILLIGRAM(S): 1 INJECTION INTRAVENOUS at 21:31

## 2020-01-03 RX ADMIN — GABAPENTIN 100 MILLIGRAM(S): 400 CAPSULE ORAL at 17:35

## 2020-01-03 RX ADMIN — Medication 10 MILLIGRAM(S): at 05:52

## 2020-01-03 RX ADMIN — TRAMADOL HYDROCHLORIDE 50 MILLIGRAM(S): 50 TABLET ORAL at 11:52

## 2020-01-03 RX ADMIN — TRAMADOL HYDROCHLORIDE 50 MILLIGRAM(S): 50 TABLET ORAL at 19:30

## 2020-01-03 RX ADMIN — BRIMONIDINE TARTRATE 1 DROP(S): 2 SOLUTION/ DROPS OPHTHALMIC at 21:31

## 2020-01-03 RX ADMIN — Medication 100 MILLIGRAM(S): at 11:45

## 2020-01-03 RX ADMIN — Medication 500000 UNIT(S): at 17:35

## 2020-01-03 RX ADMIN — BRIMONIDINE TARTRATE 1 DROP(S): 2 SOLUTION/ DROPS OPHTHALMIC at 13:43

## 2020-01-03 RX ADMIN — TRAMADOL HYDROCHLORIDE 50 MILLIGRAM(S): 50 TABLET ORAL at 18:54

## 2020-01-03 RX ADMIN — TRAMADOL HYDROCHLORIDE 50 MILLIGRAM(S): 50 TABLET ORAL at 05:53

## 2020-01-03 RX ADMIN — Medication 500000 UNIT(S): at 23:03

## 2020-01-03 RX ADMIN — LATANOPROST 1 DROP(S): 0.05 SOLUTION/ DROPS OPHTHALMIC; TOPICAL at 11:46

## 2020-01-03 RX ADMIN — SENNA PLUS 2 TABLET(S): 8.6 TABLET ORAL at 21:32

## 2020-01-03 NOTE — PROGRESS NOTE ADULT - SUBJECTIVE AND OBJECTIVE BOX
Transplant Surgery - Multidisciplinary Rounds  --------------------------------------------------------------  R DDRT    Date:  12/8819    Patient seen with multidisciplinary team including Transplant Surgeon: Dr. Poe, transplant Nephrologist: Dr. SHARLENE Puckett. Pharmacist Debbi Ortiz, PAs: Arlin Montano and Felecia Arzola, surgical resident Maria Alejandra Harding and unit RN during  am rounds and examined with Dr. Poe. Disciplines not in attendance will be notified of the plan.     HPI: 49M PMHx of ESRD on HD  ( via LUE AVF), HTN, Gout, Glaucoma, NET of pancreas (s/p robotic distal panc/splenectomy at [place] 2015 by Dr. Young, followed by Dr. Raphael, [facility] Oncologist), RA with hand contractures. 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 Post op course c/b DGF requiring HD, thrombocytopenia, elevated LDH and Haptoglobulin. Schistocytes  on peripheral smear concerning for TMA. Envarsus held. Received Belatacept 12/13. Started on PLEX (12/12, 12/15). Underwent renal bx on 12/13 c/w profound ATN.  Found to have much improvement to levels, likely related to Tacrolimus. He was discharged home on 12/20/19 w/ outpatient HD.     Re-admitted 12/27 with influenza, completed treatement with Tamiflu on 12/31. Urine cx on admission grew Ent cloacae, was started on meropenem 12/31.   Renal US on admission with increasing hematoma. OR on 1/1 for ex-lap, hematoma evacuation, washout and renal bx. OR cxs growing Ent. clocae.  Prelim bx results c/w ATN, no rejection.     cxs:   1/1 OR cx - Ent Clocae  1/1 OR fluid - GNR  12/29 Urine - Ent Clocae  12/27 Bld - Neg    Interval events:  POD 2 s/p ex-lap/washout  - H/H stable  - u/o 350, BO 65 (ss), HD yesterday  - Pain controlled on Tramadol     Potential Discharge date: pending clinical improvement    MEDICATIONS  (STANDING):  allopurinol 100 milliGRAM(s) Oral daily  brimonidine 0.2% Ophthalmic Solution 1 Drop(s) Both EYES three times a day  famotidine    Tablet 20 milliGRAM(s) Oral daily  gabapentin 100 milliGRAM(s) Oral two times a day  latanoprost 0.005% Ophthalmic Solution 1 Drop(s) Both EYES daily  meropenem  IVPB 500 milliGRAM(s) IV Intermittent every 24 hours  metoprolol tartrate 12.5 milliGRAM(s) Oral every 12 hours  mycophenolate mofetil 1000 milliGRAM(s) Oral two times a day  nystatin    Suspension 679731 Unit(s) Oral four times a day  predniSONE   Tablet 5 milliGRAM(s) Oral daily  senna 2 Tablet(s) Oral at bedtime  trimethoprim   80 mG/sulfamethoxazole 400 mG 1 Tablet(s) Oral daily  valGANciclovir 450 milliGRAM(s) Oral <User Schedule>    MEDICATIONS  (PRN):  acetaminophen   Tablet .. 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  Carpal tunnel syndrome  Brain cyst: had MRI recently- now gone  Anemia  Gastric ulcer: Long time ago  Rheumatoid arthritis  Renal transplant recipient  Breakdown (mechanical) of penile (implanted) prosthesis, sequela  End-stage renal disease (ESRD): PERMACATH PLACEMENT  Abscess of left buttock: s/p I &amp; D  AV fistula: placement left lower arm  S/P cystoscopy: stent placement &amp; removal for kidney stones, lithiotripsy  s/p Lt Carpal tunnel: 2011    Vital Signs Last 24 Hrs  T(C): 36.9 (03 Jan 2020 09:00), Max: 36.9 (03 Jan 2020 05:00)  T(F): 98.5 (03 Jan 2020 09:00), Max: 98.5 (03 Jan 2020 09:00)  HR: 71 (03 Jan 2020 09:00) (56 - 72)  BP: 126/78 (03 Jan 2020 09:00) (119/71 - 152/89)  BP(mean): --  RR: 18 (03 Jan 2020 09:00) (18 - 18)  SpO2: 100% (03 Jan 2020 09:00) (98% - 100%)    I&O's Summary    02 Jan 2020 07:01  -  03 Jan 2020 07:00  --------------------------------------------------------  IN: 2200 mL / OUT: 3215 mL / NET: -1015 mL                         8.6    13.04 )-----------( 620      ( 03 Jan 2020 06:11 )             26.9     01-03    136  |  91<L>  |  29<H>  ----------------------------<  101<H>  4.3   |  28  |  4.80<H>    Ca    8.8      03 Jan 2020 06:11  Phos  4.1     01-03  Mg     2.1     01-03    TPro  6.9  /  Alb  3.1<L>  /  TBili  0.6  /  DBili  0.3<H>  /  AST  9<L>  /  ALT  5<L>  /  AlkPhos  233<H>  01-03      Culture - Tissue with Gram Stain (collected 01-01-20 @ 17:12)   Numerous Enterobacter cloacae complex    Culture - Surgical Swab (collected 01-01-20 @ 17:06)  Source: .Surgical Swab collection around right kidney  Moderate Enterobacter cloacae complex    Culture - Body Fluid with Gram Stain (collected 01-01-20 @ 16:59)  Source: .Body Fluid periphrenic collection  Moderate Gram Negative Rods    Culture - Surgical Swab (collected 01-01-20 @ 16:51)  Source: .Surgical Swab right kidney abscess  Organism: Enterobacter cloacae complex (01-03-20 @ 09:40)    Culture - Urine (collected 12-29-19 @ 22:02)  Source: .Urine Clean Catch (Midstream)    >100,000 CFU/ml Enterobacter cloacae    Culture - Blood (collected 12-27-19 @ 13:12)  Source: .Blood Blood-Peripheral  No growth at 5 days.    REVIEW OF SYSTEMS  Gen: No weight changes, fatigue, fevers/chills, weakness  Skin: No rashes  Head/Eyes/Ears/Mouth: No headache; Normal hearing; Normal vision w/o blurriness; No sinus pain/discomfort, sore throat  Respiratory: No dyspnea, cough, wheezing, hemoptysis  CV: No chest pain, PND, orthopnea  GI: c/o incisional pain this AM. No diarrhea, constipation, nausea, vomiting, melena, hematochezia  : No increased frequency, dysuria, hematuria, nocturia  MSK: No joint pain/swelling; no back pain; no edema  Neuro: No dizziness/lightheadedness, weakness, seizures, numbness, tingling  Heme: No easy bruising or bleeding  Endo: No heat/cold intolerance  Psych: No significant nervousness, anxiety, stress, depression      PHYSICAL EXAM:  Constitutional: Well developed / well nourished  Eyes: Anicteric, PERRLA  ENMT: nc/at   Neck: Supple  Respiratory: CTA b/l    Cardiovascular: RRR  Gastrointestinal: Soft abdomen, NT, ND. incision c/d/i  Genitourinary:  Voiding spontaneously  Extremities: SCD's in place and working bilaterally  Vascular: faintly palpable pulses b/l LE; 2+ pitting edema.   Neurological: A&O x3  Skin: moderate strike through on the dressing, BO serosanguinous   Musculoskeletal: Moving all extremities Psychiatric: Responsive

## 2020-01-03 NOTE — PROGRESS NOTE ADULT - ATTENDING COMMENTS
Pt feeling better  Made 300cc urine.  Plan to cont meropenem for now, change to cipro at discharge.  Continue dialysis.

## 2020-01-03 NOTE — PROGRESS NOTE ADULT - SUBJECTIVE AND OBJECTIVE BOX
NEPHROLOGY PROGRESS NOTE    CHIEF COMPLAINT:  Renal transplant    HPI:  Making only small amounts of urine.  Notes much LE swelling.    ROS:  no SOB    EXAM:  T(F): 98.2 (01-03-20 @ 13:00)  HR: 76 (01-03-20 @ 13:00)  BP: 136/76 (01-03-20 @ 13:00)  RR: 18 (01-03-20 @ 13:00)  SpO2: 100% (01-03-20 @ 13:00)    Conversant, in no apparent distress  Normal respiratory effort, lungs clear bilaterally  Heart RRR with no murmur, large peripheral edema         LABS                             8.6    13.04 )-----------( 620      ( 03 Jan 2020 06:11 )             26.9          01-03    136  |  91<L>  |  29<H>  ----------------------------<  101<H>  4.3   |  28  |  4.80<H>    Ca    8.8      03 Jan 2020 06:11  Phos  4.1     01-03  Mg     2.1     01-03    TPro  6.9  /  Alb  3.1<L>  /  TBili  0.6  /  DBili  0.3<H>  /  AST  9<L>  /  ALT  5<L>  /  AlkPhos  233<H>  01-03    ASSESSMENT:  1.  ESRD s/p renal transplant with DGF  2.  Fluid overload    PLAN:  Hemodialysis ordered for tomorrow

## 2020-01-03 NOTE — PROGRESS NOTE ADULT - SUBJECTIVE AND OBJECTIVE BOX
[facility] DIVISION OF KIDNEY DISEASES AND HYPERTENSION -- FOLLOW UP NOTE  --------------------------------------------------------------------------------  HPI: 50 yo M with hx of ESRD on HD, s/p DDRT on 12/8, elevated SCr on HD using AVF, complicated by TMA related to CNI, now on belatacept, admitted with influenza. Pt s/p washout of infected collection and biopsy (1/1/20) which revealed ATN. Last HD treatment completed yesterday for hyperkalemia. Pt. with slightly improved UOP in past 24 hours of ~350cc.    Pt. seen and examined at bedside with transplant team. Pt. states that he feels well and has improved UOP, however limited by pain. Denies CP, SOB, N/V/F/C.    PAST HISTORY  --------------------------------------------------------------------------------  No significant changes to PMH, PSH, FHx, SHx, unless otherwise noted    ALLERGIES & MEDICATIONS  --------------------------------------------------------------------------------  Allergies    coconut (Anaphylaxis)  morphine (Pruritus; Rash)    Intolerances    Standing Inpatient Medications  allopurinol 100 milliGRAM(s) Oral daily  brimonidine 0.2% Ophthalmic Solution 1 Drop(s) Both EYES three times a day  famotidine    Tablet 20 milliGRAM(s) Oral daily  gabapentin 100 milliGRAM(s) Oral two times a day  latanoprost 0.005% Ophthalmic Solution 1 Drop(s) Both EYES daily  meropenem  IVPB 500 milliGRAM(s) IV Intermittent every 24 hours  metoprolol tartrate 12.5 milliGRAM(s) Oral every 12 hours  mycophenolate mofetil 1000 milliGRAM(s) Oral two times a day  nystatin    Suspension 404425 Unit(s) Oral four times a day  predniSONE   Tablet 5 milliGRAM(s) Oral daily  senna 2 Tablet(s) Oral at bedtime  trimethoprim   80 mG/sulfamethoxazole 400 mG 1 Tablet(s) Oral daily  valGANciclovir 450 milliGRAM(s) Oral <User Schedule>    REVIEW OF SYSTEMS  --------------------------------------------------------------------------------  Gen: No fatigue, fevers/chills, weakness  Skin: No rashes  Head/Eyes/Ears/Mouth: No headache; No sore throat  Respiratory: No dyspnea, cough,   CV: No chest pain, PND, orthopnea  GI: No abdominal pain, diarrhea, constipation, nausea, vomiting  Transplant: + pain by incision   : No increased frequency, dysuria, hematuria, nocturia    All other systems were reviewed and are negative, except as noted.  VITALS/PHYSICAL EXAM  --------------------------------------------------------------------------------  T(C): 36.9 (01-03-20 @ 09:00), Max: 36.9 (01-03-20 @ 05:00)  HR: 71 (01-03-20 @ 09:00) (56 - 72)  BP: 126/78 (01-03-20 @ 09:00) (119/71 - 152/89)  RR: 18 (01-03-20 @ 09:00) (18 - 18)  SpO2: 100% (01-03-20 @ 09:00) (98% - 100%)  Wt(kg): --    01-02-20 @ 07:01  -  01-03-20 @ 07:00  --------------------------------------------------------  IN: 2200 mL / OUT: 3215 mL / NET: -1015 mL    Physical Exam:  	Gen: NAD  	HEENT: Supple neck  	Pulm: CTA B/L  	CV: RRR, S1S2; no rub  	Abd: +BS, soft, nontender/nondistended                      Transplant: incision c/d/i  	: No suprapubic tenderness  	LE: +1-2 b/l pitting edema  	Neuro: No focal deficits  	Psych: Normal affect and mood  	Skin: Warm, without rashes  LABS/STUDIES  --------------------------------------------------------------------------------              8.6    13.04 >-----------<  620      [01-03-20 @ 06:11]              26.9     136  |  91  |  29  ----------------------------<  101      [01-03-20 @ 06:11]  4.3   |  28  |  4.80        Ca     8.8     [01-03-20 @ 06:11]      Mg     2.1     [01-03-20 @ 06:11]      Phos  4.1     [01-03-20 @ 06:11]    Creatinine Trend:  SCr 4.80 [01-03 @ 06:11]  SCr 6.88 [01-02 @ 06:33]  SCr 5.51 [01-01 @ 06:20]  SCr 6.85 [12-31 @ 06:10]  SCr 5.19 [12-30 @ 06:30]

## 2020-01-03 NOTE — PROGRESS NOTE ADULT - PROBLEM SELECTOR PLAN 1
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 tacrolimus) requiring HD, PLEX 12/12 and plasmapheresis 12/14/19. Switched tacrolimus to belatacept.    - Biopsy in OR (1/1/20) revealed ATN, no evidence of rejection.  - Had infected collection washed out, continue meropenem. Consider ID team consult.

## 2020-01-03 NOTE — CHART NOTE - NSCHARTNOTEFT_GEN_A_CORE
Nutrition follow up     Pt seen for post kidney transplant recipient (12/08) nutrition follow up per department protocol.    Hospital course as per chart: Pt 48 y/o M with PMH: ESRD on HD, HTN, Gout, Glaucoma, NET of pancreas (S/P robotic distal panc/splenectomy at [place] (2015), RA with hand contractures, S/P DDRT on (12/08/2019), complicated with DGF requiring HD secondary to ATN, thrombocytopenia, elevated LDH and Haptoglobulin, discharged home on (12/20/2019) with outpatient HD, present with fever, found with Influenza A completed treatement with Tamiflu on (12/31), S/P OR for ex-lap hematoma evacuation, washout and renal bx (01/01); last HD yesterday (01/02).    Source: Patient [x]    Family [ ]     other [x]; Medical record    Pt reports good appetite and PO intake, consuming >75% of meals. Denies difficulty chewing/swallowing. Pt denies nausea, vomiting, diarrhea, or constipation, last BM 2 days ago (01/01). Urine output as per flow sheets (01/01) 125 ml -> (01/02) 350 ml -> (01/03) 0 ml. Pt received education on post transplant and HD nutrition therapy and food safety guidelines for transplant recipients - able to teach back 5 points and denies having questions/concerns about diet and nutrition education provided.     Diet: renal     Enteral /Parenteral Nutrition: n/a    Current Weight: (12/29) 159.1 pounds -> (12/31) 152.3 pounds -> (01/03) 157.1 pounds -?accuracy of weight fluctuations likely due to fluid shifts as pt with edema and S/P kidney transplant recipient on HD, will continue to monitor.   % Weight Change: n/a    Pertinent Medications: MEDICATIONS  (STANDING):  allopurinol 100 milliGRAM(s) Oral daily  brimonidine 0.2% Ophthalmic Solution 1 Drop(s) Both EYES three times a day  famotidine    Tablet 20 milliGRAM(s) Oral daily  gabapentin 100 milliGRAM(s) Oral two times a day  latanoprost 0.005% Ophthalmic Solution 1 Drop(s) Both EYES daily  meropenem  IVPB 500 milliGRAM(s) IV Intermittent every 24 hours  metoprolol tartrate 12.5 milliGRAM(s) Oral every 12 hours  mycophenolate mofetil 1000 milliGRAM(s) Oral two times a day  nystatin    Suspension [id number] Unit(s) Oral four times a day  predniSONE   Tablet 5 milliGRAM(s) Oral daily  senna 2 Tablet(s) Oral at bedtime  trimethoprim   80 mG/sulfamethoxazole 400 mG 1 Tablet(s) Oral daily  valGANciclovir 450 milliGRAM(s) Oral <User Schedule>    MEDICATIONS  (PRN):  acetaminophen   Tablet .. 650 milliGRAM(s) Oral every 6 hours PRN Mild Pain (1 - 3)  diphenhydrAMINE 25 milliGRAM(s) Oral once PRN Rash and/or Itching  traMADol 50 milliGRAM(s) Oral every 6 hours PRN Severe Pain (7 - 10)  traMADol 25 milliGRAM(s) Oral every 6 hours PRN Moderate Pain (4 - 6)    Pertinent Labs: (01/03) Glu 101 mg/dL<H> Cr  4.80 mg/dL<H> BUN 29 mg/dL<H>   (12/11) [id number] 5.0 %    Skin: no noted pressure injuries as per documentation.   Noted +3 jolynn. ankle, foot, and leg edema as per flow sheets (previously +1 jolynn. ankle and foot edema).     Estimated Needs:   [x] no change since previous assessment  [ ] recalculated:     Previous Nutrition Diagnoses:   [x] Increased Nutrient Needs  [x] Limited adherence to nutrition therapy education    Nutrition Diagnoses are [x] ongoing - pt continues with good PO intake, denies having questions about education at this time.   Pt currently at goals: Pt to meet >75% of estimated nutritional needs during hospital stay and able to teach back 3 points of nutrition education provided.     New Nutrition Diagnosis: [x] not applicable    Interventions:     1. Recommend continue renal diet upon discharge. Recommend follow up visit with Transplant MD and outpatient RD for dietary modifications as warranted.   2. Review education on post transplant and HD nutrition therapy and food safety guidelines for transplant recipients before discharge as needed/requested.   3. Continue to obtain weights to identify changes if any.     Monitoring and Evaluation:     [x] PO intake [x] Tolerance to diet prescription [x] weights [x] follow up per protocol    [x] other: urine output; needs for further education     RD remains available.  Mari Morse MS RDN CDN #[phone].

## 2020-01-03 NOTE — PROGRESS NOTE ADULT - ASSESSMENT
49M PMHx of ESRD on HD  ( via LUE AVF), HTN, Gout, Glaucoma, NET of pancreas (s/p robotic distal panc/splenectomy at [place] 2015 by Dr. Young, followed by Dr. Raphael, [facility] Oncologist), RA with hand contractures. 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 Post op course c/b DGF requiring HD, thrombocytopenia, elevated LDH and Haptoglobulin. Schistocytes  on peripheral smear concerning for TMA. Envarsus held. Received Belatacept 12/13. Started on PLEX (12/12, 12/15). Underwent renal bx on 12/13 c/w profound ATN. Re-admitted with Influenza       Influenza  - RVP + influenza A  - Completed Tamiflu    s/p DDRT c/b DGF/ATN   - US on admission with increased perinephric fluid collection  - POD 2 s/p ex lap/washout and renal txp bx (prelim c/w ATN, no rejection)   - Immuno: Belatacept (last d ose 12/28, Next dose 1/6), MMF 1g bid, Pred 5 (decreased from 10mg)  - PPx: bactrim/valcyte/nystatin   - Strict I/Os  - Renal diet  - Bowel regimen  - SCDs , IS, encourage ambulation     ID: UTI/Infected Hematoma, cxs grew Enterobacter cloacae   - cont meropenem   - bld cx negative     HTN,  -Well controlled at present  - Continue Metoprolol 12.5 BID   - hold  hydralazine

## 2020-01-03 NOTE — PROGRESS NOTE ADULT - PROBLEM SELECTOR PLAN 2
s/p belatacept 12/28/19  - next dose belatacept 1/6/20  - continue MMF and decrease prednisone to 5 mg daily   - continue ppx: Nystatin, bactrim, Valcyte renally dosed TIW, Pepcid

## 2020-01-03 NOTE — PROGRESS NOTE ADULT - ASSESSMENT
49 year old male PMH ESRD on HD ( via LUE AVF) s/p DDRT 12/8/19 (CMV -/+, EBV -/+), NET of pancreas (s/p robotic distal panc/splenectomy 2015), RA with hand contractures who presented to [facility] on 12/27 with cough and fever. In the ED febrile to 101.7, tachycardic > 90 and normotensive. WBC on presentation of 14.87 with 81.9% PMN.     RVP with Influenza A.   CXR Clear  At this point appears patient has Influenza infection without superimposed pneumonia.  s/p Tamiflu treatment course    UCx with > 100K Enterobacter  s/p Ex-Lap and washout of infected hematoma on 1/1  Growth of Enterobacter from surgical cultures  Isolates susceptible to Carbapenems and Fluoroquinolones    I would continue Meropenem while admitted.   Would switch to Ciprofloxacin on discharge  Anticipate at least 2 weeks of antibiotics from surgery date    Overall, Infected Hematoma, Positive Urine Culture, Influenza Infection, Leukocytosis, Fever, Renal Transplant Recipient    --Recommend ESR/CRP  --Continue Meropenem 500 mg IV Q24H  --Continue to follow CBC with diff  --Continue to follow temperature curve    I will be away over this upcoming weekend. Please contact the Infectious Diseases Office with any further questions or concerns.     Dada Jacobo M.D.  [facility] Division of Infectious Disease  8AM-5PM: Pager Number [phone]  After Hours (or if no response): Please contact the Infectious Diseases Office at [phone]

## 2020-01-03 NOTE — PROGRESS NOTE ADULT - SUBJECTIVE AND OBJECTIVE BOX
Follow Up:  Flu, Infected hematoma    Interval History: Afebrile overnight. Notes improves pressure in suprapubic area. no n/v/d. minimal cough.     REVIEW OF SYSTEMS  [  ] ROS unobtainable because:    [ x ] All other systems negative except as noted below    Constitutional:  [ ] fever [ ] chills  [ ] weight loss  [ ] weakness  Skin:  [ ] rash [ ] phlebitis	  Eyes: [ ] icterus [ ] pain  [ ] discharge	  ENMT: [ ] sore throat  [ ] thrush [ ] ulcers [ ] exudates  Respiratory: [ ] dyspnea [ ] hemoptysis [ ] cough [ ] sputum	  Cardiovascular:  [ ] chest pain [ ] palpitations [ ] edema	  Gastrointestinal:  [ ] nausea [ ] vomiting [ ] diarrhea [ ] constipation [ ] pain	  Genitourinary:  [ ] dysuria [ ] frequency [ ] hematuria [ ] discharge [ ] flank pain  [ ] incontinence  Musculoskeletal:  [ ] myalgias [ ] arthralgias [ ] arthritis  [ ] back pain  Neurological:  [ ] headache [ ] seizures  [ ] confusion/altered mental status    Allergies  coconut (Anaphylaxis)  morphine (Pruritus; Rash)        ANTIMICROBIALS:  meropenem  IVPB 500 every 24 hours  nystatin    Suspension [id number] four times a day  trimethoprim   80 mG/sulfamethoxazole 400 mG 1 daily  valGANciclovir 450 <User Schedule>      OTHER MEDS:  MEDICATIONS  (STANDING):  acetaminophen   Tablet .. 650 every 6 hours PRN  allopurinol 100 daily  diphenhydrAMINE 25 once PRN  famotidine    Tablet 20 daily  gabapentin 100 two times a day  metoprolol tartrate 12.5 every 12 hours  mycophenolate mofetil 1000 two times a day  predniSONE   Tablet 5 daily  senna 2 at bedtime  traMADol 50 every 6 hours PRN  traMADol 25 every 6 hours PRN      Vital Signs Last 24 Hrs  T(C): 36.7 (03 Jan 2020 21:00), Max: 36.9 (03 Jan 2020 05:00)  T(F): 98 (03 Jan 2020 21:00), Max: 98.5 (03 Jan 2020 09:00)  HR: 74 (03 Jan 2020 21:00) (67 - 76)  BP: 115/74 (03 Jan 2020 21:00) (115/74 - 136/76)  BP(mean): --  RR: 18 (03 Jan 2020 21:00) (18 - 18)  SpO2: 100% (03 Jan 2020 21:00) (99% - 100%)    PHYSICAL EXAMINATION:  General: Alert and Awake, NAD  HEENT: PERRL, EOMI  Neck: Supple  Cardiac: RRR, No M/R/G  Resp: CTAB, No Wh/Rh/Ra  Abdomen: Surgical site in RLQ with no drainage. RLQ drain with mixed bloody/purulent drainage. mild-mod tenderness in RLQ. No rigidity.   MSK: No LE edema. No Calf tenderness  : No martinez  Skin: No rashes or lesions. Skin is warm and dry to the touch.   Neuro: Alert and Awake. CN 2-12 Grossly intact. Moves all four extremities spontaneously.  Psych: Calm, Pleasant, Cooperative                          8.6    13.04 )-----------( 620      ( 03 Jan 2020 06:11 )             26.9       01-03    136  |  91<L>  |  29<H>  ----------------------------<  101<H>  4.3   |  28  |  4.80<H>    Ca    8.8      03 Jan 2020 06:11  Phos  4.1     01-03  Mg     2.1     01-03    TPro  6.9  /  Alb  3.1<L>  /  TBili  0.6  /  DBili  0.3<H>  /  AST  9<L>  /  ALT  5<L>  /  AlkPhos  233<H>  01-03          MICROBIOLOGY:  v  .Tissue Other  01-01-20   Numerous Enterobacter cloacae complex  --  Enterobacter cloacae complex      .Surgical Swab collection around right kidney  01-01-20   Moderate Enterobacter cloacae complex  --  Enterobacter cloacae complex      .Body Fluid periphrenic collection  01-01-20   Moderate Escherichia coli Susceptibility to follow.  Culture in progress  --    Upon re-evaluation of gram stain: Rare Gram Negative Rods seen  No polymorphonuclear cells seen  by cytocentrifuge      .Surgical Swab right kidney abscess  01-01-20   Moderate Enterobacter cloacae complex  --  Enterobacter cloacae complex      .Urine Clean Catch (Midstream)  12-29-19   >100,000 CFU/ml Enterobacter cloacae  --  Enterobacter cloacae      .Blood Blood-Peripheral  12-27-19   No growth at 5 days.  --  --      .Stool Feces  12-14-19   No enteric pathogens isolated.  (Stool culture examined for Salmonella,  Shigella, Campylobacter, Aeromonas, Plesiomonas,  Vibrio, E.coli O157 and Yersinia)  --  --      .Stool moriah johnie  12-14-19   GI PCR Results: NOT detected  *******Please Note:*******  GI panel PCR evaluates for:  Campylobacter, Plesiomonas shigelloides, Salmonella,  Vibrio, Yersinia enterocolitica, Enteroaggregative  Escherichia coli (EAEC), Enteropathogenic E.coli (EPEC),  Enterotoxigenic E. coli (ETEC) lt/st, Shiga-like  toxin-producing E. coli (STEC) stx1/stx2,  Shigella/ Enteroinvasive E. coli (EIEC), Cryptosporidium,  Cyclospora cayetanensis, Entamoeba histolytica,  Giardia lamblia, Adenovirus F 40/41, Astrovirus,  Norovirus GI/GII, Rotavirus A, Sapovirus  --  --      .Nose Nose  12-10-19   Few Methicillin Sensitive Staph aureus "This can represent normal nasal  carriage.  PCR is more sensitive for identifying MRSA/MSSA carriage"  --  --                RADIOLOGY:    EXAM:  US KIDNEY TRANSPLANT W DOPP RT                        PROCEDURE DATE:  12/31/2019    Elevated arterial blood flow velocities at the transplant anastomosis. This likely represents continued arterial spasm secondary to adjacent perinephric hematoma.  Minimal interval decrease in size of perinephric hematoma.    EXAM:  US KIDNEY TRANSPLANT W DOPP RT                        PROCEDURE DATE:  12/27/2019    Elevated velocities at the renal artery anastomosis, similar in appearance to 12/18/2019. Recommend continued follow up Doppler ultrasound.  High impedance waveforms throughout the transplant kidney, new since 12/18/2019, nonspecific. Recommend continued follow-up.  A 9.4 cm perinephric hematoma, previously documented as measuring 5.7 cm.  Urothelial thickening, nonspecific

## 2020-01-04 DIAGNOSIS — N17.0 ACUTE KIDNEY FAILURE WITH TUBULAR NECROSIS: ICD-10-CM

## 2020-01-04 LAB
-  AMIKACIN: SIGNIFICANT CHANGE UP
-  AMOXICILLIN/CLAVULANIC ACID: SIGNIFICANT CHANGE UP
-  AMPICILLIN/SULBACTAM: SIGNIFICANT CHANGE UP
-  AMPICILLIN: SIGNIFICANT CHANGE UP
-  AZTREONAM: SIGNIFICANT CHANGE UP
-  CEFAZOLIN: SIGNIFICANT CHANGE UP
-  CEFEPIME: SIGNIFICANT CHANGE UP
-  CEFOXITIN: SIGNIFICANT CHANGE UP
-  CEFTRIAXONE: SIGNIFICANT CHANGE UP
-  CIPROFLOXACIN: SIGNIFICANT CHANGE UP
-  ERTAPENEM: SIGNIFICANT CHANGE UP
-  GENTAMICIN: SIGNIFICANT CHANGE UP
-  IMIPENEM: SIGNIFICANT CHANGE UP
-  LEVOFLOXACIN: SIGNIFICANT CHANGE UP
-  MEROPENEM: SIGNIFICANT CHANGE UP
-  PIPERACILLIN/TAZOBACTAM: SIGNIFICANT CHANGE UP
-  TOBRAMYCIN: SIGNIFICANT CHANGE UP
-  TRIMETHOPRIM/SULFAMETHOXAZOLE: SIGNIFICANT CHANGE UP
ALBUMIN SERPL ELPH-MCNC: 2.9 G/DL — LOW (ref 3.3–5)
ALP SERPL-CCNC: 236 U/L — HIGH (ref 40–120)
ALT FLD-CCNC: <5 U/L — LOW (ref 10–45)
ANION GAP SERPL CALC-SCNC: 16 MMOL/L — SIGNIFICANT CHANGE UP (ref 5–17)
ANION GAP SERPL CALC-SCNC: 19 MMOL/L — HIGH (ref 5–17)
AST SERPL-CCNC: 14 U/L — SIGNIFICANT CHANGE UP (ref 10–40)
BILIRUB DIRECT SERPL-MCNC: 0.3 MG/DL — HIGH (ref 0–0.2)
BILIRUB INDIRECT FLD-MCNC: 0.3 MG/DL — SIGNIFICANT CHANGE UP (ref 0.2–1)
BILIRUB SERPL-MCNC: 0.6 MG/DL — SIGNIFICANT CHANGE UP (ref 0.2–1.2)
BUN SERPL-MCNC: 47 MG/DL — HIGH (ref 7–23)
BUN SERPL-MCNC: 50 MG/DL — HIGH (ref 7–23)
CALCIUM SERPL-MCNC: 8.4 MG/DL — SIGNIFICANT CHANGE UP (ref 8.4–10.5)
CALCIUM SERPL-MCNC: 9.4 MG/DL — SIGNIFICANT CHANGE UP (ref 8.4–10.5)
CHLORIDE SERPL-SCNC: 89 MMOL/L — LOW (ref 96–108)
CHLORIDE SERPL-SCNC: 90 MMOL/L — LOW (ref 96–108)
CO2 SERPL-SCNC: 20 MMOL/L — LOW (ref 22–31)
CO2 SERPL-SCNC: 23 MMOL/L — SIGNIFICANT CHANGE UP (ref 22–31)
CREAT SERPL-MCNC: 6.22 MG/DL — HIGH (ref 0.5–1.3)
CREAT SERPL-MCNC: 6.86 MG/DL — HIGH (ref 0.5–1.3)
CRP SERPL-MCNC: 5.43 MG/DL — HIGH (ref 0–0.4)
ERYTHROCYTE [SEDIMENTATION RATE] IN BLOOD: 120 MM/HR — HIGH (ref 0–20)
GLUCOSE SERPL-MCNC: 61 MG/DL — LOW (ref 70–99)
GLUCOSE SERPL-MCNC: 82 MG/DL — SIGNIFICANT CHANGE UP (ref 70–99)
HCT VFR BLD CALC: 28.5 % — LOW (ref 39–50)
HGB BLD-MCNC: 9 G/DL — LOW (ref 13–17)
MAGNESIUM SERPL-MCNC: 2.1 MG/DL — SIGNIFICANT CHANGE UP (ref 1.6–2.6)
MAGNESIUM SERPL-MCNC: 2.1 MG/DL — SIGNIFICANT CHANGE UP (ref 1.6–2.6)
MCHC RBC-ENTMCNC: 30.5 PG — SIGNIFICANT CHANGE UP (ref 27–34)
MCHC RBC-ENTMCNC: 31.6 GM/DL — LOW (ref 32–36)
MCV RBC AUTO: 96.6 FL — SIGNIFICANT CHANGE UP (ref 80–100)
METHOD TYPE: SIGNIFICANT CHANGE UP
NRBC # BLD: 0 /100 WBCS — SIGNIFICANT CHANGE UP (ref 0–0)
PHOSPHATE SERPL-MCNC: 4.9 MG/DL — HIGH (ref 2.5–4.5)
PHOSPHATE SERPL-MCNC: 5.1 MG/DL — HIGH (ref 2.5–4.5)
PLATELET # BLD AUTO: 635 K/UL — HIGH (ref 150–400)
POTASSIUM SERPL-MCNC: 5.3 MMOL/L — SIGNIFICANT CHANGE UP (ref 3.5–5.3)
POTASSIUM SERPL-MCNC: 5.4 MMOL/L — HIGH (ref 3.5–5.3)
POTASSIUM SERPL-SCNC: 5.3 MMOL/L — SIGNIFICANT CHANGE UP (ref 3.5–5.3)
POTASSIUM SERPL-SCNC: 5.4 MMOL/L — HIGH (ref 3.5–5.3)
PROT SERPL-MCNC: 7 G/DL — SIGNIFICANT CHANGE UP (ref 6–8.3)
RBC # BLD: 2.95 M/UL — LOW (ref 4.2–5.8)
RBC # FLD: 17.5 % — HIGH (ref 10.3–14.5)
SODIUM SERPL-SCNC: 128 MMOL/L — LOW (ref 135–145)
SODIUM SERPL-SCNC: 129 MMOL/L — LOW (ref 135–145)
WBC # BLD: 17.89 K/UL — HIGH (ref 3.8–10.5)
WBC # FLD AUTO: 17.89 K/UL — HIGH (ref 3.8–10.5)

## 2020-01-04 PROCEDURE — 74176 CT ABD & PELVIS W/O CONTRAST: CPT | Mod: 26

## 2020-01-04 PROCEDURE — 99232 SBSQ HOSP IP/OBS MODERATE 35: CPT

## 2020-01-04 PROCEDURE — 93970 EXTREMITY STUDY: CPT | Mod: 26

## 2020-01-04 PROCEDURE — 93010 ELECTROCARDIOGRAM REPORT: CPT

## 2020-01-04 PROCEDURE — 99232 SBSQ HOSP IP/OBS MODERATE 35: CPT | Mod: GC

## 2020-01-04 RX ORDER — MYCOPHENOLATE MOFETIL 250 MG/1
500 CAPSULE ORAL
Refills: 0 | Status: DISCONTINUED | OUTPATIENT
Start: 2020-01-04 | End: 2020-01-05

## 2020-01-04 RX ORDER — LABETALOL HCL 100 MG
10 TABLET ORAL ONCE
Refills: 0 | Status: DISCONTINUED | OUTPATIENT
Start: 2020-01-04 | End: 2020-01-04

## 2020-01-04 RX ORDER — HEPARIN SODIUM 5000 [USP'U]/ML
5000 INJECTION INTRAVENOUS; SUBCUTANEOUS EVERY 12 HOURS
Refills: 0 | Status: DISCONTINUED | OUTPATIENT
Start: 2020-01-04 | End: 2020-01-06

## 2020-01-04 RX ADMIN — MYCOPHENOLATE MOFETIL 500 MILLIGRAM(S): 250 CAPSULE ORAL at 17:20

## 2020-01-04 RX ADMIN — Medication 100 MILLIGRAM(S): at 11:49

## 2020-01-04 RX ADMIN — BRIMONIDINE TARTRATE 1 DROP(S): 2 SOLUTION/ DROPS OPHTHALMIC at 05:42

## 2020-01-04 RX ADMIN — BRIMONIDINE TARTRATE 1 DROP(S): 2 SOLUTION/ DROPS OPHTHALMIC at 17:30

## 2020-01-04 RX ADMIN — Medication 5 MILLIGRAM(S): at 05:42

## 2020-01-04 RX ADMIN — BRIMONIDINE TARTRATE 1 DROP(S): 2 SOLUTION/ DROPS OPHTHALMIC at 22:33

## 2020-01-04 RX ADMIN — TRAMADOL HYDROCHLORIDE 50 MILLIGRAM(S): 50 TABLET ORAL at 23:00

## 2020-01-04 RX ADMIN — TRAMADOL HYDROCHLORIDE 50 MILLIGRAM(S): 50 TABLET ORAL at 12:19

## 2020-01-04 RX ADMIN — TRAMADOL HYDROCHLORIDE 50 MILLIGRAM(S): 50 TABLET ORAL at 05:42

## 2020-01-04 RX ADMIN — TRAMADOL HYDROCHLORIDE 50 MILLIGRAM(S): 50 TABLET ORAL at 22:30

## 2020-01-04 RX ADMIN — HEPARIN SODIUM 5000 UNIT(S): 5000 INJECTION INTRAVENOUS; SUBCUTANEOUS at 17:27

## 2020-01-04 RX ADMIN — FAMOTIDINE 20 MILLIGRAM(S): 10 INJECTION INTRAVENOUS at 11:48

## 2020-01-04 RX ADMIN — Medication 500000 UNIT(S): at 05:42

## 2020-01-04 RX ADMIN — GABAPENTIN 100 MILLIGRAM(S): 400 CAPSULE ORAL at 17:20

## 2020-01-04 RX ADMIN — TRAMADOL HYDROCHLORIDE 50 MILLIGRAM(S): 50 TABLET ORAL at 11:49

## 2020-01-04 RX ADMIN — MYCOPHENOLATE MOFETIL 1000 MILLIGRAM(S): 250 CAPSULE ORAL at 05:42

## 2020-01-04 RX ADMIN — Medication 500000 UNIT(S): at 17:20

## 2020-01-04 RX ADMIN — GABAPENTIN 100 MILLIGRAM(S): 400 CAPSULE ORAL at 05:42

## 2020-01-04 RX ADMIN — TRAMADOL HYDROCHLORIDE 50 MILLIGRAM(S): 50 TABLET ORAL at 06:35

## 2020-01-04 RX ADMIN — Medication 12.5 MILLIGRAM(S): at 17:20

## 2020-01-04 RX ADMIN — Medication 12.5 MILLIGRAM(S): at 05:42

## 2020-01-04 RX ADMIN — Medication 1 TABLET(S): at 11:49

## 2020-01-04 RX ADMIN — Medication 500000 UNIT(S): at 11:48

## 2020-01-04 RX ADMIN — LATANOPROST 1 DROP(S): 0.05 SOLUTION/ DROPS OPHTHALMIC; TOPICAL at 11:48

## 2020-01-04 NOTE — PROGRESS NOTE ADULT - ASSESSMENT
S/p Flu A  ESRD on HD  S/p DDRT 12/8/2019  DGF  S/p transplant kidney bx 12/13: ATN, focal TMA  S/p PLEX 12/13 and 12/15  Carlos-nephric hematoma noted on ultrasound 12/27, increased from prior  S/p OR 1/1 for washout and repeat renal graft biopsy, prelim pathology ATN  Infected carlos-nephric hematoma  Abx per ID  Heparin free HD  TMP as able 3 kg, avoiding hypotension during HD  Hypervolemic hyponatremia - re-educated on fluid restriction

## 2020-01-04 NOTE — PROGRESS NOTE ADULT - SUBJECTIVE AND OBJECTIVE BOX
Transplant Surgery - Multidisciplinary Rounds  --------------------------------------------------------------  R DDRT    Date:  12/8/19 Dr. Espinal  Ex Lap washout/ renal bx  Date:  1/1/20    Patient seen with multidisciplinary team including Transplant Surgeon: Dr. Poe, transplant Nephrologist: Dr. SHARLENE Puckett. Pharmacist Debbi Ortiz, PAs: Arlin Montano and Felecia Arzola, surgical resident Maria Alejandra Harding and unit RN during  am rounds and examined with Dr. Poe. Disciplines not in attendance will be notified of the plan.     HPI: 49M PMHx of ESRD on HD  ( via LUE AVF), HTN, Gout, Glaucoma, NET of pancreas (s/p robotic distal panc/splenectomy at [place] 2015 by Dr. Young, followed by Dr. Raphael, [facility] Oncologist), RA with hand contractures. 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 Post op course c/b DGF requiring HD, thrombocytopenia, elevated LDH and Haptoglobulin. Schistocytes  on peripheral smear concerning for TMA. Envarsus held. Received Belatacept 12/13. Started on PLEX (12/12, 12/15). Underwent renal bx on 12/13 c/w profound ATN.  Found to have much improvement to levels, likely related to Tacrolimus. He was discharged home on 12/20/19 w/ outpatient HD.     Re-admitted 12/27 with influenza, completed treatement with Tamiflu on 12/31. Urine cx on admission grew Ent cloacae, was started on meropenem 12/31.   Renal US on admission with increasing hematoma. OR on 1/1 for ex-lap, hematoma evacuation, washout and renal bx. OR cxs growing Ent. clocae.  Prelim bx results c/w ATN, no rejection.     cxs:   1/1 OR cx - Ent Clocae  1/1 OR fluid - GNR Ecoli   12/29 Urine - Ent Clocae  12/27 Bld - Neg    Interval events:  POD 3  s/p ex-lap/washout Renal bx  - bx prelim ATN mod arterial sclerosis pred dec to 5 mg qd  - body fluid cult Pos E coli   - Increasing  WBC 17.89 up from 13 T max 37.9  - Pain controlled     Potential Discharge date: pending clinical improvement      MEDICATIONS  (STANDING):  allopurinol 100 milliGRAM(s) Oral daily  brimonidine 0.2% Ophthalmic Solution 1 Drop(s) Both EYES three times a day  famotidine    Tablet 20 milliGRAM(s) Oral daily  gabapentin 100 milliGRAM(s) Oral two times a day  heparin  Injectable 5000 Unit(s) SubCutaneous every 12 hours  latanoprost 0.005% Ophthalmic Solution 1 Drop(s) Both EYES daily  meropenem  IVPB 500 milliGRAM(s) IV Intermittent every 24 hours  metoprolol tartrate 12.5 milliGRAM(s) Oral every 12 hours  mycophenolate mofetil 500 milliGRAM(s) Oral two times a day  nystatin    Suspension [id number] Unit(s) Oral four times a day  predniSONE   Tablet 5 milliGRAM(s) Oral daily  senna 2 Tablet(s) Oral at bedtime  trimethoprim   80 mG/sulfamethoxazole 400 mG 1 Tablet(s) Oral daily  valGANciclovir 450 milliGRAM(s) Oral <User Schedule>    MEDICATIONS  (PRN):  acetaminophen   Tablet .. 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  Carpal tunnel syndrome  Brain cyst: had MRI recently- now gone  Anemia  Gastric ulcer: Long time ago  Rheumatoid arthritis  Renal transplant recipient  Breakdown (mechanical) of penile (implanted) prosthesis, sequela  End-stage renal disease (ESRD): PERMACATH PLACEMENT  Abscess of left buttock: s/p I &amp; D  AV fistula: placement left lower arm  S/P cystoscopy: stent placement &amp; removal for kidney stones, lithiotripsy  s/p Lt Carpal tunnel: 2011      Vital Signs Last 24 Hrs  T(C): 38.1 (04 Jan 2020 09:00), Max: 38.1 (04 Jan 2020 09:00)  T(F): 100.6 (04 Jan 2020 09:00), Max: 100.6 (04 Jan 2020 09:00)  HR: 79 (04 Jan 2020 09:00) (69 - 90)  BP: 149/79 (04 Jan 2020 09:00) (115/74 - 173/84)  BP(mean): --  RR: 18 (04 Jan 2020 09:00) (18 - 18)  SpO2: 98% (04 Jan 2020 09:00) (96% - 100%)    I&O's Summary    03 Jan 2020 07:01  -  04 Jan 2020 07:00  --------------------------------------------------------  IN: 714 mL / OUT: 262.5 mL / NET: 451.5 mL          LABS:                        9.0    17.89 )-----------( 635      ( 04 Jan 2020 06:58 )             28.5     01-04    129<L>  |  90<L>  |  47<H>  ----------------------------<  61<L>  5.4<H>   |  23  |  6.22<H>    Ca    9.4      04 Jan 2020 06:58  Phos  5.1     01-04  Mg     2.1     01-04    TPro  7.0  /  Alb  2.9<L>  /  TBili  0.6  /  DBili  0.3<H>  /  AST  14  /  ALT  <5<L>  /  AlkPhos  236<H>  01-04        CAPILLARY BLOOD GLUCOSE        LIVER FUNCTIONS - ( 04 Jan 2020 06:58 )  Alb: 2.9 g/dL / Pro: 7.0 g/dL / ALK PHOS: 236 U/L / ALT: <5 U/L / AST: 14 U/L / GGT: x             Culture - Tissue with Gram Stain (collected 01-01-20 @ 17:12)  Source: .Tissue Other  Gram Stain (01-02-20 @ 00:25):    Rare polymorphonuclear leukocytes seen per low power field    Rare Gram Negative Rods seen per oil power field  Preliminary Report (01-02-20 @ 13:15):    Numerous Enterobacter cloacae complex  Organism: Enterobacter cloacae complex (01-03-20 @ 12:47)  Organism: Enterobacter cloacae complex (01-03-20 @ 12:47)    Culture - Surgical Swab (collected 01-01-20 @ 17:06)  Source: .Surgical Swab collection around right kidney  Preliminary Report (01-02-20 @ 13:16):    Moderate Enterobacter cloacae complex  Organism: Enterobacter cloacae complex (01-03-20 @ 09:42)  Organism: Enterobacter cloacae complex (01-03-20 @ 09:42)    Culture - Body Fluid with Gram Stain (collected 01-01-20 @ 16:59)  Source: .Body Fluid periphrenic collection  Gram Stain (01-02-20 @ 09:22):    Upon re-evaluation of gram stain: Rare Gram Negative Rods seen    No polymorphonuclear cells seen    by cytocentrifuge  Preliminary Report (01-04-20 @ 09:28):    Moderate Escherichia coli    Moderate Enterobacter cloacae Susceptibility to follow.    Culture in progress  Organism: Escherichia coli (01-04-20 @ 09:25)  Organism: Escherichia coli (01-04-20 @ 09:25)    Culture - Surgical Swab (collected 01-01-20 @ 16:51)  Source: .Surgical Swab right kidney abscess  Preliminary Report (01-03-20 @ 09:41):    Moderate Enterobacter cloacae complex  Organism: Enterobacter cloacae complex (01-03-20 @ 09:40)  Organism: Enterobacter cloacae complex (01-03-20 @ 09:40)    Culture - Urine (collected 12-29-19 @ 22:02)  Source: .Urine Clean Catch (Midstream)  Final Report (12-31-19 @ 16:32):    >100,000 CFU/ml Enterobacter cloacae  Organism: Enterobacter cloacae (12-31-19 @ 16:32)  Organism: Enterobacter cloacae (12-31-19 @ 16:32)          Cultures:    Culture - Tissue with Gram Stain (collected 01-01-20 @ 17:12)  Source: .Tissue Other  Gram Stain (01-02-20 @ 00:25):    Rare polymorphonuclear leukocytes seen per low power field    Rare Gram Negative Rods seen per oil power field  Preliminary Report (01-02-20 @ 13:15):    Numerous Enterobacter cloacae complex  Organism: Enterobacter cloacae complex (01-03-20 @ 12:47)  Organism: Enterobacter cloacae complex (01-03-20 @ 12:47)    Culture - Surgical Swab (collected 01-01-20 @ 17:06)  Source: .Surgical Swab collection around right kidney  Preliminary Report (01-02-20 @ 13:16):    Moderate Enterobacter cloacae complex  Organism: Enterobacter cloacae complex (01-03-20 @ 09:42)  Organism: Enterobacter cloacae complex (01-03-20 @ 09:42)    Culture - Body Fluid with Gram Stain (collected 01-01-20 @ 16:59)  Source: .Body Fluid periphrenic collection  Gram Stain (01-02-20 @ 09:22):    Upon re-evaluation of gram stain: Rare Gram Negative Rods seen    No polymorphonuclear cells seen    by cytocentrifuge  Preliminary Report (01-04-20 @ 09:28):    Moderate Escherichia coli    Moderate Enterobacter cloacae Susceptibility to follow.    Culture in progress  Organism: Escherichia coli (01-04-20 @ 09:25)  Organism: Escherichia coli (01-04-20 @ 09:25)    Culture - Surgical Swab (collected 01-01-20 @ 16:51)  Source: .Surgical Swab right kidney abscess  Preliminary Report (01-03-20 @ 09:41):    Moderate Enterobacter cloacae complex  Organism: Enterobacter cloacae complex (01-03-20 @ 09:40)  Organism: Enterobacter cloacae complex (01-03-20 @ 09:40)    Culture - Urine (collected 12-29-19 @ 22:02)  Source: .Urine Clean Catch (Midstream)  Final Report (12-31-19 @ 16:32):    >100,000 CFU/ml Enterobacter cloacae  Organism: Enterobacter cloacae (12-31-19 @ 16:32)  Organism: Enterobacter cloacae (12-31-19 @ 16:32)      REVIEW OF SYSTEMS  Gen: No weight changes, fatigue, fevers/chills, weakness  Skin: No rashes  Head/Eyes/Ears/Mouth: No headache; Normal hearing; Normal vision w/o blurriness; No sinus pain/discomfort, sore throat  Respiratory: No dyspnea, cough, wheezing, hemoptysis  CV: No chest pain, PND, orthopnea  GI: c/o incisional pain this AM. No diarrhea, constipation, nausea, vomiting, melena, hematochezia  : No increased frequency, dysuria, hematuria, nocturia  MSK: No joint pain/swelling; no back pain; no edema  Neuro: No dizziness/lightheadedness, weakness, seizures, numbness, tingling  Heme: No easy bruising or bleeding  Endo: No heat/cold intolerance  Psych: No significant nervousness, anxiety, stress, depression      PHYSICAL EXAM:  Constitutional: Well developed / well nourished  Eyes: Anicteric, PERRLA  ENMT: nc/at   Neck: Supple  Respiratory: crackles at bases    Cardiovascular: RRR  Gastrointestinal: Soft abdomen, NT, ND. incision c/d/i  Genitourinary:  Voiding spontaneously  Extremities: SCD's in place and working bilaterally  Vascular: faintly palpable pulses b/l LE; 2+ pitting edema. R > L   Neurological: A&O x3  Skin: moderate strike through on the dressing, BO serosanguinous   Musculoskeletal: Moving all extremities Psychiatric: Responsive

## 2020-01-04 NOTE — PROGRESS NOTE ADULT - ATTENDING COMMENTS
Kidney transplant recipient with delayed functioning renal allograft- ATN  TMA post transplant, off CNI  S/p re exploration and washout  Hypertension, controlled  Reviewed immunosuppression and allograft function  On belatacept, MMF, prednisone, next nelly due on 1/6  Influenza  Bilateral ankle edema  Plan:  Continue current immunosuppression  Follow blood culture, duplex LE  Hemodialysis being managed by primary nephrologist  I was present during and reviewed clinical and lab data as well as assessment and plan as documented by the house staff as noted. Please contact if any additional questions with any change in clinical condition or on availability of any additional information or reports.

## 2020-01-04 NOTE — PROGRESS NOTE ADULT - SUBJECTIVE AND OBJECTIVE BOX
Patient seen in follow up for dialytic support.  Events noted, getting on HD session now. No pain, no SOB.      MEDICATIONS  (STANDING):  allopurinol 100 milliGRAM(s) Oral daily  brimonidine 0.2% Ophthalmic Solution 1 Drop(s) Both EYES three times a day  famotidine    Tablet 20 milliGRAM(s) Oral daily  gabapentin 100 milliGRAM(s) Oral two times a day  heparin  Injectable 5000 Unit(s) SubCutaneous every 12 hours  latanoprost 0.005% Ophthalmic Solution 1 Drop(s) Both EYES daily  meropenem  IVPB 500 milliGRAM(s) IV Intermittent every 24 hours  metoprolol tartrate 12.5 milliGRAM(s) Oral every 12 hours  mycophenolate mofetil 500 milliGRAM(s) Oral two times a day  nystatin    Suspension [id number] Unit(s) Oral four times a day  predniSONE   Tablet 5 milliGRAM(s) Oral daily  senna 2 Tablet(s) Oral at bedtime  trimethoprim   80 mG/sulfamethoxazole 400 mG 1 Tablet(s) Oral daily  valGANciclovir 450 milliGRAM(s) Oral <User Schedule>    MEDICATIONS  (PRN):  acetaminophen   Tablet .. 650 milliGRAM(s) Oral every 6 hours PRN Mild Pain (1 - 3)  diphenhydrAMINE 25 milliGRAM(s) Oral once PRN Rash and/or Itching  traMADol 50 milliGRAM(s) Oral every 6 hours PRN Severe Pain (7 - 10)  traMADol 25 milliGRAM(s) Oral every 6 hours PRN Moderate Pain (4 - 6)    T(C): 37.3 (01-04-20 @ 20:37), Max: 38.1 (01-04-20 @ 09:00)  HR: 99 (01-04-20 @ 20:37) (69 - 99)  BP: 178/94 (01-04-20 @ 20:37) (115/74 - 178/94)  RR: 18 (01-04-20 @ 20:37) (18 - 18)  SpO2: 98% (01-04-20 @ 20:37) (96% - 100%)  Wt(kg): --  I&O's Detail    03 Jan 2020 07:01  -  04 Jan 2020 07:00  --------------------------------------------------------  IN:    Oral Fluid: 714 mL  Total IN: 714 mL    OUT:    Bulb: 12.5 mL    Voided: 250 mL  Total OUT: 262.5 mL    Total NET: 451.5 mL      04 Jan 2020 07:01  -  04 Jan 2020 21:25  --------------------------------------------------------  IN:    Oral Fluid: 600 mL  Total IN: 600 mL    OUT:    Bulb: 1 mL    Voided: 300 mL  Total OUT: 301 mL    Total NET: 299 mL      PHYSICAL EXAM:  General: NAD, AAO x3  Respiratory: b/l air entry, clear  Cardiovascular: S1 S2  Gastrointestinal: soft, NT  Extremities:  large legs edema    CBC Full  -  ( 04 Jan 2020 06:58 )  WBC Count : 17.89 K/uL  RBC Count : 2.95 M/uL  Hemoglobin : 9.0 g/dL  Hematocrit : 28.5 %  Platelet Count - Automated : 635 K/uL  Mean Cell Volume : 96.6 fl  Mean Cell Hemoglobin : 30.5 pg  Mean Cell Hemoglobin Concentration : 31.6 gm/dL  Auto Neutrophil # : x  Auto Lymphocyte # : x  Auto Monocyte # : x  Auto Eosinophil # : x  Auto Basophil # : x  Auto Neutrophil % : x  Auto Lymphocyte % : x  Auto Monocyte % : x  Auto Eosinophil % : x  Auto Basophil % : x    01-04    129<L>  |  90<L>  |  47<H>  ----------------------------<  61<L>  5.4<H>   |  23  |  6.22<H>    Ca    9.4      04 Jan 2020 06:58  Phos  5.1     01-04  Mg     2.1     01-04    TPro  7.0  /  Alb  2.9<L>  /  TBili  0.6  /  DBili  0.3<H>  /  AST  14  /  ALT  <5<L>  /  AlkPhos  236<H>  01-04        Sodium, Serum: 129 (01-04 @ 06:58)  Sodium, Serum: 136 (01-03 @ 06:11)  Sodium, Serum: 136 (01-02 @ 06:33)    Creatinine, Serum: 6.22 (01-04 @ 06:58)  Creatinine, Serum: 4.80 (01-03 @ 06:11)  Creatinine, Serum: 6.88 (01-02 @ 06:33)    Potassium, Serum: 5.4 (01-04 @ 06:58)  Potassium, Serum: 4.3 (01-03 @ 06:11)  Potassium, Serum: 5.9 (01-02 @ 06:33)    Hemoglobin: 9.0 (01-04 @ 06:58)  Hemoglobin: 8.6 (01-03 @ 06:11)  Hemoglobin: 8.3 (01-02 @ 06:33)

## 2020-01-04 NOTE — PROGRESS NOTE ADULT - ASSESSMENT
49M PMHx of ESRD on HD  ( via LUE AVF), HTN, Gout, Glaucoma, NET of pancreas (s/p robotic distal panc/splenectomy at [place] 2015 by Dr. Young, followed by Dr. Raphael, [facility] Oncologist), RA with hand contractures. 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 Post op course c/b DGF requiring HD, thrombocytopenia, elevated LDH and Haptoglobulin. Schistocytes  on peripheral smear concerning for TMA. Envarsus held. Received Belatacept 12/13. Started on PLEX (12/12, 12/15). Underwent renal bx on 12/13 c/w profound ATN. Re-admitted with Influenza now s/p hematoma evacuation/ abd washout and renal biopsy.       Influenza  - RVP + influenza A  - Completed Tamiflu      s/p DDRT c/b DGF/ATN   - US on admission with increased perinephric fluid collection  - POD 2 s/p ex lap/washout and renal txp bx (prelim c/w ATN, no rejection)   - HD today as per nephrology   - Immuno: Belatacept (last d ose 12/28, Next dose 1/6), Pred 5 (decreased from 10mg)  - decrease MMF to 500 BID 2/2 uptrending WBC  - b/l LE dopplers 2/2 inc edema   - PPx: bactrim/valcyte/nystatin   - start SQ heparin 5000 units BID   - Strict I/Os  - Renal diet  - Bowel regimen  - SCDs , IS, encourage ambulation     ID: UTI/Infected Hematoma, cxs grew Enterobacter cloacae   - cont meropenem   - bld cx negative   - body fluid cult now pos Ecoli sensitivities pending   - send blood cult x 2 during HD   - CT scan w/ oral contrast Abd/ pelvis    HTN,  -Well controlled at present  - Continue Metoprolol 12.5 BID   - hold  hydralazine

## 2020-01-04 NOTE — PROGRESS NOTE ADULT - SUBJECTIVE AND OBJECTIVE BOX
--------------------------------------------------------------------------------  Chief Complaint: Kidney recipient admitted with fever. Currently reports swlling of both ankles    24 hour events/subjective:    Reviewed.     PAST HISTORY  --------------------------------------------------------------------------------  No significant changes to PMH, PSH, FHx, SHx, unless otherwise noted    ALLERGIES & MEDICATIONS  --------------------------------------------------------------------------------  Allergies    coconut (Anaphylaxis)  morphine (Pruritus; Rash)    Intolerances      Standing Inpatient Medications  allopurinol 100 milliGRAM(s) Oral daily  brimonidine 0.2% Ophthalmic Solution 1 Drop(s) Both EYES three times a day  famotidine    Tablet 20 milliGRAM(s) Oral daily  gabapentin 100 milliGRAM(s) Oral two times a day  heparin  Injectable 5000 Unit(s) SubCutaneous every 12 hours  latanoprost 0.005% Ophthalmic Solution 1 Drop(s) Both EYES daily  meropenem  IVPB 500 milliGRAM(s) IV Intermittent every 24 hours  metoprolol tartrate 12.5 milliGRAM(s) Oral every 12 hours  mycophenolate mofetil 500 milliGRAM(s) Oral two times a day  nystatin    Suspension 095366 Unit(s) Oral four times a day  predniSONE   Tablet 5 milliGRAM(s) Oral daily  senna 2 Tablet(s) Oral at bedtime  trimethoprim   80 mG/sulfamethoxazole 400 mG 1 Tablet(s) Oral daily  valGANciclovir 450 milliGRAM(s) Oral <User Schedule>    PRN Inpatient Medications  acetaminophen   Tablet .. 650 milliGRAM(s) Oral every 6 hours PRN  diphenhydrAMINE 25 milliGRAM(s) Oral once PRN  traMADol 50 milliGRAM(s) Oral every 6 hours PRN  traMADol 25 milliGRAM(s) Oral every 6 hours PRN      REVIEW OF SYSTEMS  --------------------------------------------------------------------------------  Gen: fatigue   Skin: No rashes  Head/Eyes/Ears/Mouth: No headache;No sore throat  Respiratory: No dyspnea, cough,   CV: No chest pain, PND, orthopnea  GI: No acute abdominal pain, diarrhea, constipation, nausea, vomiting  Transplant: post op discomfort  All other systems were reviewed and are negative, except as noted.    VITALS/PHYSICAL EXAM  --------------------------------------------------------------------------------  T(C): 38.1 (01-04-20 @ 09:00), Max: 38.1 (01-04-20 @ 09:00)  HR: 79 (01-04-20 @ 09:00) (69 - 90)  BP: 149/79 (01-04-20 @ 09:00) (115/74 - 173/84)  RR: 18 (01-04-20 @ 09:00) (18 - 18)  SpO2: 98% (01-04-20 @ 09:00) (96% - 100%)         01-03-20 @ 07:01  -  01-04-20 @ 07:00  --------------------------------------------------------  IN: 714 mL / OUT: 262.5 mL / NET: 451.5 mL      Physical Exam:  	Gen: No distress  	HEENT: no acute signs  	Pulm: CTA B/L  	CV: RRR, S1S2; no rub  	Back: No spinal or CVA tenderness; no sacral edema  	Abd: +BS, soft, nontender/nondistended                      Transplant: Staples +, drain minimal discharge  	   	UE:  no asterixis  	LE: bilateral ankle edema  	Neuro: No acute focal deficits  	Psych: Normal affect and mood  	Skin: Warm, without rashes      LABS/STUDIES  --------------------------------------------------------------------------------              9.0    17.89 >-----------<  635      [01-04-20 @ 06:58]              28.5     129  |  90  |  47  ----------------------------<  61      [01-04-20 @ 06:58]  5.4   |  23  |  6.22        Ca     9.4     [01-04-20 @ 06:58]      Mg     2.1     [01-04-20 @ 06:58]      Phos  5.1     [01-04-20 @ 06:58]    TPro  7.0  /  Alb  2.9  /  TBili  0.6  /  DBili  0.3  /  AST  14  /  ALT  <5  /  AlkPhos  236  [01-04-20 @ 06:58]          Creatinine Trend:  SCr 6.22 [01-04 @ 06:58]  SCr 4.80 [01-03 @ 06:11]  SCr 6.88 [01-02 @ 06:33]  SCr 5.51 [01-01 @ 06:20]  SCr 6.85 [12-31 @ 06:10]              Urinalysis - [12-29-19 @ 03:40]      Color Dark Brown / Appearance Turbid / SG 1.021 / pH 7.5      Gluc Negative / Ketone Trace  / Bili Small / Urobili <2 mg/dL       Blood Large / Protein 100 mg/dL / Leuk Est Large / Nitrite Negative      RBC >720 / WBC >720 / Hyaline 5 / Gran  / Sq Epi  / Non Sq Epi 8 / Bacteria Negative      HbA1c 5.0      [12-11-19 @ 08:21]    HBsAg Nonreact      [12-12-19 @ 22:37]  HCV 0.15, Nonreact      [12-12-19 @ 22:37]

## 2020-01-05 DIAGNOSIS — R50.9 FEVER, UNSPECIFIED: ICD-10-CM

## 2020-01-05 LAB
-  AMIKACIN: SIGNIFICANT CHANGE UP
-  AMOXICILLIN/CLAVULANIC ACID: SIGNIFICANT CHANGE UP
-  AMPICILLIN/SULBACTAM: SIGNIFICANT CHANGE UP
-  AMPICILLIN: SIGNIFICANT CHANGE UP
-  AZTREONAM: SIGNIFICANT CHANGE UP
-  CEFAZOLIN: SIGNIFICANT CHANGE UP
-  CEFEPIME: SIGNIFICANT CHANGE UP
-  CEFOXITIN: SIGNIFICANT CHANGE UP
-  CEFTAZIDIME/AVIBACTAM: SIGNIFICANT CHANGE UP
-  CEFTOLOZANE/TAZOBACTAM: SIGNIFICANT CHANGE UP
-  CEFTRIAXONE: SIGNIFICANT CHANGE UP
-  CIPROFLOXACIN: SIGNIFICANT CHANGE UP
-  ERTAPENEM: SIGNIFICANT CHANGE UP
-  GENTAMICIN: SIGNIFICANT CHANGE UP
-  IMIPENEM: SIGNIFICANT CHANGE UP
-  LEVOFLOXACIN: SIGNIFICANT CHANGE UP
-  MEROPENEM: SIGNIFICANT CHANGE UP
-  PIPERACILLIN/TAZOBACTAM: SIGNIFICANT CHANGE UP
-  TOBRAMYCIN: SIGNIFICANT CHANGE UP
-  TRIMETHOPRIM/SULFAMETHOXAZOLE: SIGNIFICANT CHANGE UP
ALBUMIN SERPL ELPH-MCNC: 2.9 G/DL — LOW (ref 3.3–5)
ALP SERPL-CCNC: 238 U/L — HIGH (ref 40–120)
ALT FLD-CCNC: <5 U/L — LOW (ref 10–45)
ANION GAP SERPL CALC-SCNC: 17 MMOL/L — SIGNIFICANT CHANGE UP (ref 5–17)
AST SERPL-CCNC: 13 U/L — SIGNIFICANT CHANGE UP (ref 10–40)
BILIRUB DIRECT SERPL-MCNC: 0.4 MG/DL — HIGH (ref 0–0.2)
BILIRUB INDIRECT FLD-MCNC: 0.6 MG/DL — SIGNIFICANT CHANGE UP (ref 0.2–1)
BILIRUB SERPL-MCNC: 1 MG/DL — SIGNIFICANT CHANGE UP (ref 0.2–1.2)
BUN SERPL-MCNC: 25 MG/DL — HIGH (ref 7–23)
CALCIUM SERPL-MCNC: 9 MG/DL — SIGNIFICANT CHANGE UP (ref 8.4–10.5)
CHLORIDE SERPL-SCNC: 92 MMOL/L — LOW (ref 96–108)
CO2 SERPL-SCNC: 26 MMOL/L — SIGNIFICANT CHANGE UP (ref 22–31)
CREAT SERPL-MCNC: 4.38 MG/DL — HIGH (ref 0.5–1.3)
GLUCOSE BLDC GLUCOMTR-MCNC: 110 MG/DL — HIGH (ref 70–99)
GLUCOSE SERPL-MCNC: 64 MG/DL — LOW (ref 70–99)
HCT VFR BLD CALC: 25.6 % — LOW (ref 39–50)
HGB BLD-MCNC: 8.6 G/DL — LOW (ref 13–17)
MAGNESIUM SERPL-MCNC: 2 MG/DL — SIGNIFICANT CHANGE UP (ref 1.6–2.6)
MCHC RBC-ENTMCNC: 31.5 PG — SIGNIFICANT CHANGE UP (ref 27–34)
MCHC RBC-ENTMCNC: 33.6 GM/DL — SIGNIFICANT CHANGE UP (ref 32–36)
MCV RBC AUTO: 93.8 FL — SIGNIFICANT CHANGE UP (ref 80–100)
METHOD TYPE: SIGNIFICANT CHANGE UP
NRBC # BLD: 0 /100 WBCS — SIGNIFICANT CHANGE UP (ref 0–0)
PHOSPHATE SERPL-MCNC: 3.2 MG/DL — SIGNIFICANT CHANGE UP (ref 2.5–4.5)
PLATELET # BLD AUTO: 621 K/UL — HIGH (ref 150–400)
POTASSIUM SERPL-MCNC: 4 MMOL/L — SIGNIFICANT CHANGE UP (ref 3.5–5.3)
POTASSIUM SERPL-SCNC: 4 MMOL/L — SIGNIFICANT CHANGE UP (ref 3.5–5.3)
PROT SERPL-MCNC: 6.8 G/DL — SIGNIFICANT CHANGE UP (ref 6–8.3)
RBC # BLD: 2.73 M/UL — LOW (ref 4.2–5.8)
RBC # FLD: 17.2 % — HIGH (ref 10.3–14.5)
SODIUM SERPL-SCNC: 135 MMOL/L — SIGNIFICANT CHANGE UP (ref 135–145)
WBC # BLD: 26.55 K/UL — HIGH (ref 3.8–10.5)
WBC # FLD AUTO: 26.55 K/UL — HIGH (ref 3.8–10.5)

## 2020-01-05 PROCEDURE — 99232 SBSQ HOSP IP/OBS MODERATE 35: CPT

## 2020-01-05 PROCEDURE — 93010 ELECTROCARDIOGRAM REPORT: CPT

## 2020-01-05 PROCEDURE — 99233 SBSQ HOSP IP/OBS HIGH 50: CPT

## 2020-01-05 PROCEDURE — 99291 CRITICAL CARE FIRST HOUR: CPT

## 2020-01-05 PROCEDURE — 99232 SBSQ HOSP IP/OBS MODERATE 35: CPT | Mod: GC

## 2020-01-05 RX ORDER — VANCOMYCIN HCL 1 G
1000 VIAL (EA) INTRAVENOUS ONCE
Refills: 0 | Status: COMPLETED | OUTPATIENT
Start: 2020-01-05 | End: 2020-01-05

## 2020-01-05 RX ORDER — POLYETHYLENE GLYCOL 3350 17 G/17G
17 POWDER, FOR SOLUTION ORAL DAILY
Refills: 0 | Status: DISCONTINUED | OUTPATIENT
Start: 2020-01-05 | End: 2020-01-10

## 2020-01-05 RX ORDER — CHLORHEXIDINE GLUCONATE 213 G/1000ML
1 SOLUTION TOPICAL DAILY
Refills: 0 | Status: DISCONTINUED | OUTPATIENT
Start: 2020-01-05 | End: 2020-01-05

## 2020-01-05 RX ORDER — SODIUM CHLORIDE 9 MG/ML
1000 INJECTION, SOLUTION INTRAVENOUS
Refills: 0 | Status: DISCONTINUED | OUTPATIENT
Start: 2020-01-05 | End: 2020-01-05

## 2020-01-05 RX ORDER — FLUCONAZOLE 150 MG/1
200 TABLET ORAL DAILY
Refills: 0 | Status: DISCONTINUED | OUTPATIENT
Start: 2020-01-05 | End: 2020-01-06

## 2020-01-05 RX ORDER — CHLORHEXIDINE GLUCONATE 213 G/1000ML
1 SOLUTION TOPICAL
Refills: 0 | Status: DISCONTINUED | OUTPATIENT
Start: 2020-01-06 | End: 2020-02-14

## 2020-01-05 RX ADMIN — FAMOTIDINE 20 MILLIGRAM(S): 10 INJECTION INTRAVENOUS at 11:34

## 2020-01-05 RX ADMIN — FLUCONAZOLE 200 MILLIGRAM(S): 150 TABLET ORAL at 13:02

## 2020-01-05 RX ADMIN — Medication 650 MILLIGRAM(S): at 05:30

## 2020-01-05 RX ADMIN — GABAPENTIN 100 MILLIGRAM(S): 400 CAPSULE ORAL at 05:29

## 2020-01-05 RX ADMIN — TRAMADOL HYDROCHLORIDE 25 MILLIGRAM(S): 50 TABLET ORAL at 14:10

## 2020-01-05 RX ADMIN — BRIMONIDINE TARTRATE 1 DROP(S): 2 SOLUTION/ DROPS OPHTHALMIC at 05:29

## 2020-01-05 RX ADMIN — TRAMADOL HYDROCHLORIDE 50 MILLIGRAM(S): 50 TABLET ORAL at 20:20

## 2020-01-05 RX ADMIN — Medication 500000 UNIT(S): at 02:09

## 2020-01-05 RX ADMIN — GABAPENTIN 100 MILLIGRAM(S): 400 CAPSULE ORAL at 17:37

## 2020-01-05 RX ADMIN — Medication 250 MILLIGRAM(S): at 11:53

## 2020-01-05 RX ADMIN — BRIMONIDINE TARTRATE 1 DROP(S): 2 SOLUTION/ DROPS OPHTHALMIC at 21:35

## 2020-01-05 RX ADMIN — TRAMADOL HYDROCHLORIDE 50 MILLIGRAM(S): 50 TABLET ORAL at 19:51

## 2020-01-05 RX ADMIN — TRAMADOL HYDROCHLORIDE 25 MILLIGRAM(S): 50 TABLET ORAL at 13:10

## 2020-01-05 RX ADMIN — TRAMADOL HYDROCHLORIDE 50 MILLIGRAM(S): 50 TABLET ORAL at 08:24

## 2020-01-05 RX ADMIN — BRIMONIDINE TARTRATE 1 DROP(S): 2 SOLUTION/ DROPS OPHTHALMIC at 13:01

## 2020-01-05 RX ADMIN — MYCOPHENOLATE MOFETIL 500 MILLIGRAM(S): 250 CAPSULE ORAL at 05:29

## 2020-01-05 RX ADMIN — LATANOPROST 1 DROP(S): 0.05 SOLUTION/ DROPS OPHTHALMIC; TOPICAL at 11:34

## 2020-01-05 RX ADMIN — HEPARIN SODIUM 5000 UNIT(S): 5000 INJECTION INTRAVENOUS; SUBCUTANEOUS at 05:29

## 2020-01-05 RX ADMIN — Medication 1 TABLET(S): at 11:34

## 2020-01-05 RX ADMIN — Medication 5 MILLIGRAM(S): at 05:29

## 2020-01-05 RX ADMIN — MEROPENEM 100 MILLIGRAM(S): 1 INJECTION INTRAVENOUS at 02:08

## 2020-01-05 RX ADMIN — Medication 100 MILLIGRAM(S): at 11:34

## 2020-01-05 RX ADMIN — CHLORHEXIDINE GLUCONATE 1 APPLICATION(S): 213 SOLUTION TOPICAL at 21:35

## 2020-01-05 RX ADMIN — Medication 12.5 MILLIGRAM(S): at 05:29

## 2020-01-05 RX ADMIN — HEPARIN SODIUM 5000 UNIT(S): 5000 INJECTION INTRAVENOUS; SUBCUTANEOUS at 17:36

## 2020-01-05 RX ADMIN — TRAMADOL HYDROCHLORIDE 50 MILLIGRAM(S): 50 TABLET ORAL at 08:55

## 2020-01-05 RX ADMIN — Medication 500000 UNIT(S): at 05:29

## 2020-01-05 RX ADMIN — SODIUM CHLORIDE 75 MILLILITER(S): 9 INJECTION, SOLUTION INTRAVENOUS at 11:34

## 2020-01-05 RX ADMIN — MEROPENEM 100 MILLIGRAM(S): 1 INJECTION INTRAVENOUS at 21:35

## 2020-01-05 NOTE — CONSULT NOTE ADULT - SUBJECTIVE AND OBJECTIVE BOX
SICU CONSULT NOTE    HPI  KAYLA PA is a 49y Male PMHx of ESRD on HD  (via LUE AVF), HTN, Gout, Glaucoma, PNET s/p robotic distal panc/splenectomy (at [facility] 2015 by Dr. Young, followed by Dr. Raphael, [facility] Oncologist), RA with hand contractures. 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 Post op course c/b DGF requiring HD, thrombocytopenia, elevated LDH and Haptoglobulin. Schistocytes  on peripheral smear concerning for TMA. Envarsus held. Received Belatacept 12/13. Started on PLEX (12/12, 12/15). Underwent renal bx on 12/13 c/w profound ATN.  Found to have much improvement to levels, likely related to Tacrolimus. He was discharged home on 12/20/19 w/ outpatient HD.     He was now sent to ED by dialysis center after he was found to be febrile to 102, he did not get HD this AM, and last full HD session was on 12/26. Upon arrival, he was febrile to 101.7, other vitals were stable. He states that he had a cough that began 5-6 days ago that has now mostly resolved. He reports no other febrile episodes other than this morning. He admits to having one SOB episode earlier this week that resolved after he got HD. He also states that his mother was hospitalized earlier this week and was flu+. Currently makes less than 1 cup urine/daily. He currently denies SOB, CP, dyspnea, HA, dizziness, N/V, diarrhea, constipation.     1/1/2020 was taken to OR and found to have a perinephric hematoma. Underwent washout, evacuation of hematoma, and biopsy of transplanted kidney. He was recovering on the floor when overnight he spiked a fever to 38.1 and had 18 beats of wide complex tachycardia. He was also found to have worsening leukocytosis and became acutely hypotensive. A CT scan was obtained demonstrating a 4.5x3.7 perinephric collection. IR was consulted for drainage and SICU was consulted for hemodynamic monitoring.        PAST MEDICAL HISTORY: Erectile dysfunction  Glaucoma  Gout  HTN (hypertension)  ESRD (end stage renal disease) on dialysis  Contracture of finger joint  Carpal tunnel syndrome  Brain cyst  Anemia  Renal insufficiency  Gastric ulcer  Rheumatoid arthritis      PAST SURGICAL HISTORY: Renal transplant recipient  Breakdown (mechanical) of penile (implanted) prosthesis, sequela  End-stage renal disease (ESRD)  Abscess of left buttock  AV fistula  S/P cystoscopy  s/p Lt Carpal tunnel  Rheumatoid arthritis      FAMILY HISTORY: Family history of myocardial infarction in first degree male relative  Family history of rheumatoid arthritis  Family history of hypertension in mother  Family history of cerebral aneurysm (Sibling)      SOCIAL HISTORY:    CODE STATUS:     HOME MEDICATIONS:  Home Medications:  allopurinol 100 mg oral tablet: 1 tab(s) orally once a day (07 Apr 2015 14:28)  brimonidine 0.2% ophthalmic solution: 1 drop(s) to each affected eye 3 times a day (20 Dec 2019 12:02)  dorzolamide 2% ophthalmic solution: 1 drop(s) to each affected eye 3 times a day (20 Dec 2019 12:02)  epoetin trey: to be administered with HD three times a week (20 Dec 2019 12:02)  famotidine 20 mg oral tablet: 1 tab(s) orally once a day (20 Dec 2019 12:02)  gabapentin 100 mg oral capsule: 1 cap(s) orally 2 times a day (20 Dec 2019 12:02)  hydrALAZINE 50 mg oral tablet: 1 tab(s) orally 2 times a day (20 Dec 2019 12:02)  latanoprost ophthalmic 0.005% ophthalmic solution: 1 drop(s) to each affected eye once a day (at bedtime) (07 Apr 2015 14:28)  metoprolol 25 mg oral tablet: 1 tab(s) orally 2 times a day (07 Apr 2015 14:28)  mycophenolate mofetil 500 mg oral tablet: 2 tab(s) orally 2 times a day (20 Dec 2019 12:02)  nystatin 100,000 units/mL oral suspension: 5 milliliter(s) orally 4 times a day (20 Dec 2019 12:02)  predniSONE: please follow your steroid taper sheet (20 Dec 2019 12:02)  sulfamethoxazole-trimethoprim 400 mg-80 mg oral tablet: 1 tab(s) orally once a day (20 Dec 2019 12:02)  valGANciclovir 450 mg oral tablet: 1 tab(s) orally  (20 Dec 2019 12:02)      ALLERGIES: coconut (Anaphylaxis)  morphine (Pruritus; Rash)      VITAL SIGNS:  ICU Vital Signs Last 24 Hrs  T(C): 36.9 (05 Jan 2020 11:00), Max: 39.3 (05 Jan 2020 05:00)  T(F): 98.4 (05 Jan 2020 11:00), Max: 102.7 (05 Jan 2020 05:00)  HR: 63 (05 Jan 2020 14:00) (63 - 100)  BP: 102/58 (05 Jan 2020 14:00) (90/40 - 181/100)  BP(mean): 76 (05 Jan 2020 14:00) (76 - 81)  ABP: --  ABP(mean): --  RR: 15 (05 Jan 2020 14:00) (15 - 20)  SpO2: 98% (05 Jan 2020 14:00) (96% - 100%)      NEURO  Exam: alert, oriented  acetaminophen   Tablet .. 650 milliGRAM(s) Oral every 6 hours PRN Mild Pain (1 - 3)  diphenhydrAMINE 25 milliGRAM(s) Oral once PRN Rash and/or Itching  gabapentin 100 milliGRAM(s) Oral two times a day  traMADol 50 milliGRAM(s) Oral every 6 hours PRN Severe Pain (7 - 10)  traMADol 25 milliGRAM(s) Oral every 6 hours PRN Moderate Pain (4 - 6)      RESPIRATORY  Mechanical Ventilation: N/A    Exam: breathing comfortably on room air      CARDIOVASCULAR    Exam: regular rate and rhythm  Cardiac Rhythm: sinus      GI/NUTRITION  Exam: soft, appropriately tender, ND, dressing in place  Diet: NPO for procedure  famotidine    Tablet 20 milliGRAM(s) Oral daily  senna 2 Tablet(s) Oral at bedtime      GENITOURINARY/RENAL  dextrose 5% + sodium chloride 0.45%. 1000 milliLiter(s) IV Continuous <Continuous>      01-04 @ 07:01  -  01-05 @ 07:00  --------------------------------------------------------  IN:    Oral Fluid: 900 mL    Solution: 100 mL  Total IN: 1000 mL    OUT:    Bulb: 1 mL    Other: 2000 mL    Voided: 350 mL  Total OUT: 2351 mL    Total NET: -1351 mL      01-05 @ 07:01  -  01-05 @ 14:57  --------------------------------------------------------  IN:    dextrose 5% + sodium chloride 0.45%.: 225 mL  Total IN: 225 mL    OUT:    Voided: 70 mL  Total OUT: 70 mL    Total NET: 155 mL          01-05    135  |  92<L>  |  25<H>  ----------------------------<  64<L>  4.0   |  26  |  4.38<H>    Ca    9.0      05 Jan 2020 06:32  Phos  3.2     01-05  Mg     2.0     01-05    TPro  6.8  /  Alb  2.9<L>  /  TBili  1.0  /  DBili  0.4<H>  /  AST  13  /  ALT  <5<L>  /  AlkPhos  238<H>  01-05    [ ] Kennedy catheter, indication: urine output monitoring in critically ill patient    HEMATOLOGIC  [ ] VTE Prophylaxis:  heparin  Injectable 5000 Unit(s) SubCutaneous every 12 hours                          8.6    26.55 )-----------( 621      ( 05 Jan 2020 06:32 )             25.6       Transfusion: [ ] PRBC	[ ] Platelets	[ ] FFP	[ ] Cryoprecipitate      INFECTIOUS DISEASES  fluconAZOLE   Tablet 200 milliGRAM(s) Oral daily  meropenem  IVPB 500 milliGRAM(s) IV Intermittent every 24 hours  trimethoprim   80 mG/sulfamethoxazole 400 mG 1 Tablet(s) Oral daily  valGANciclovir 450 milliGRAM(s) Oral <User Schedule>    RECENT CULTURES:  Specimen Source: .Blood Blood  Date/Time: 01-04 @ 13:07  Culture Results:   No growth to date.  Gram Stain: --  Organism: --  Specimen Source: .Tissue Other  Date/Time: 01-01 @ 17:12  Culture Results:   Numerous Enterobacter cloacae complex  Gram Stain:   Rare polymorphonuclear leukocytes seen per low power field  Rare Gram Negative Rods seen per oil power field  Organism: Enterobacter cloacae complex  Specimen Source: .Surgical Swab collection around right kidney  Date/Time: 01-01 @ 17:06  Culture Results:   Moderate Enterobacter cloacae complex  Gram Stain: --  Organism: Enterobacter cloacae complex  Specimen Source: .Body Fluid periphrenic collection  Date/Time: 01-01 @ 16:59  Culture Results:   Moderate Escherichia coli  Moderate Enterobacter cloacae  Gram Stain:   Upon re-evaluation of gram stain: Rare Gram Negative Rods seen  No polymorphonuclear cells seen  by cytocentrifuge  Organism: Escherichia coli  Enterobacter cloacae (Carbapenem Resistant)  Specimen Source: .Surgical Swab right kidney abscess  Date/Time: 01-01 @ 16:51  Culture Results:   Moderate Enterobacter cloacae complex  Gram Stain: --  Organism: Enterobacter cloacae complex      ENDOCRINE  allopurinol 100 milliGRAM(s) Oral daily  predniSONE   Tablet 5 milliGRAM(s) Oral daily    CAPILLARY BLOOD GLUCOSE      POCT Blood Glucose.: 110 mg/dL (05 Jan 2020 08:01)      PATIENT CARE ACCESS DEVICES:  [ ] Peripheral IV  [ ] Central Venous Line	[ ] R	[ ] L	[ ] IJ	[ ] Fem	[ ] SC	Placed:   [ ] Arterial Line		[ ] R	[ ] L	[ ] Fem	[ ] Rad	[ ] Ax	Placed:   [ ] PICC:					[ ] Mediport  [ ] Urinary Catheter, Date Placed:   [x] Necessity of urinary, arterial, and venous catheters discussed    OTHER MEDICATIONS: brimonidine 0.2% Ophthalmic Solution 1 Drop(s) Both EYES three times a day  latanoprost 0.005% Ophthalmic Solution 1 Drop(s) Both EYES daily      IMAGING STUDIES:

## 2020-01-05 NOTE — PROGRESS NOTE ADULT - ASSESSMENT
imp/rx:  The patient has recent renal xplant, complicated by e. cloacae local infection--underwent IR drainage.  Ongoing fevers and leukocytosis.  I think that the cause of this is not directly related to inadequate coverage with the meropenem.  The meropenem is fine.  I think that there is ongoing collection/pathology in the region of the transplant and he needs, at a minimum, optimized drainage.  Also on the ddx is that he could have localized infective thrombophlebitis related to the extensive infection.    Would continue the meropenem.   I don't think the vanco or diflucan is necessary.    d/w Dr. Dawkins in icu    d/w Dr. Poe

## 2020-01-05 NOTE — PROGRESS NOTE ADULT - ATTENDING COMMENTS
Kidney transplant recipient with delayed functioning renal allograft- ATN  TMA post transplant, off CNI, on Belatacept based regimen  S/p re exploration and washout, has persistent collection present on imaging  Hypertension, controlled, low blood pressure today  Reviewed immunosuppression and allograft function  On belatacept, MMF, prednisone,, will d/c MMF  Influenza  Bilateral ankle edema  Plan:  Being transferred to 8ICU for evaluation and management  ID follow up evaluation  Agree with expanding antimicrobial coverage  Continue current immunosuppression  Follow blood culture,   I/R evaluation for drainage of possibly infected collection  Hemodialysis being managed by primary nephrologist, last dialyzed on 1/4/20.  I was present during and reviewed clinical and lab data as well as assessment and plan as documented by the house staff as noted. Please contact if any additional questions with any change in clinical condition or on availability of any additional information or reports.

## 2020-01-05 NOTE — PROGRESS NOTE ADULT - SUBJECTIVE AND OBJECTIVE BOX
Transplant Surgery - Multidisciplinary Rounds  --------------------------------------------------------------  R DDRT    Date:  12/8/19 Dr. Espinal  Ex Lap washout/ renal bx  Date:  1/1/20    Patient seen with multidisciplinary team including Transplant Surgeon: Dr. Poe, transplant Nephrologist: Beverley Barrios and Felecia Arzola,  resident Maria Alejandra Harding in am rounds and examined with Dr. Poe. Disciplines not in attendance will be notified of the plan.     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 Young, followed by Dr. Raphael, [facility] Oncologist), RA with hand contractures. 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 Post op course c/b DGF requiring HD, thrombocytopenia, elevated LDH and Haptoglobulin. Schistocytes  on peripheral smear concerning for TMA. Envarsus held. Received Belatacept 12/13. Started on PLEX (12/12, 12/15). Underwent renal bx on 12/13 c/w profound ATN.  Found to have much improvement to levels, likely related to Tacrolimus. He was discharged home on 12/20/19 w/ outpatient HD.     Re-admitted 12/27 with influenza, completed treatement with Tamiflu on 12/31. Urine cx on admission grew Ent cloacae, was started on meropenem 12/31.   Renal US on admission with increasing hematoma. OR on 1/1 for ex-lap, hematoma evacuation, washout and renal bx. OR cxs growing Ent. clocae.  Prelim bx results c/w ATN, no rejection.     cxs:   1/1 OR cx - Ent Clocae  1/1 OR fluid - GNR Ecoli   12/29 Urine - Ent Clocae  12/27 Bld - Neg    Interval events:  POD 4  s/p ex-lap/washout Renal bx  - yest Temp 38.1 repeat blood cx sent overnight Tmax  39.3   - experienced several seconds asymptomatic wide complex tachycardia w/ spontaneous resolution   - Increasing  WBC 26.55 up from 17.89 - MMF decreased to 500 mg BID  - Ct scan abd pelvis demonstrates residual collection   - HD yesterday 2 L removed     Potential Discharge date: pending clinical improvement        MEDICATIONS  (STANDING):  allopurinol 100 milliGRAM(s) Oral daily  brimonidine 0.2% Ophthalmic Solution 1 Drop(s) Both EYES three times a day  dextrose 5% + sodium chloride 0.45%. 1000 milliLiter(s) (75 mL/Hr) IV Continuous <Continuous>  famotidine    Tablet 20 milliGRAM(s) Oral daily  fluconAZOLE   Tablet 200 milliGRAM(s) Oral daily  gabapentin 100 milliGRAM(s) Oral two times a day  heparin  Injectable 5000 Unit(s) SubCutaneous every 12 hours  latanoprost 0.005% Ophthalmic Solution 1 Drop(s) Both EYES daily  meropenem  IVPB 500 milliGRAM(s) IV Intermittent every 24 hours  predniSONE   Tablet 5 milliGRAM(s) Oral daily  senna 2 Tablet(s) Oral at bedtime  trimethoprim   80 mG/sulfamethoxazole 400 mG 1 Tablet(s) Oral daily  valGANciclovir 450 milliGRAM(s) Oral <User Schedule>    MEDICATIONS  (PRN):  acetaminophen   Tablet .. 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  Carpal tunnel syndrome  Brain cyst: had MRI recently- now gone  Anemia  Gastric ulcer: Long time ago  Rheumatoid arthritis  Renal transplant recipient  Breakdown (mechanical) of penile (implanted) prosthesis, sequela  End-stage renal disease (ESRD): PERMACATH PLACEMENT  Abscess of left buttock: s/p I &amp; D  AV fistula: placement left lower arm  S/P cystoscopy: stent placement &amp; removal for kidney stones, lithiotripsy  s/p Lt Carpal tunnel: 2011      Vital Signs Last 24 Hrs  T(C): 36.9 (05 Jan 2020 11:00), Max: 39.3 (05 Jan 2020 05:00)  T(F): 98.4 (05 Jan 2020 11:00), Max: 102.7 (05 Jan 2020 05:00)  HR: 69 (05 Jan 2020 11:00) (69 - 100)  BP: 108/61 (05 Jan 2020 11:00) (90/40 - 181/100)  BP(mean): 79 (05 Jan 2020 11:00) (79 - 79)  RR: 16 (05 Jan 2020 11:00) (16 - 20)  SpO2: 100% (05 Jan 2020 11:00) (96% - 100%)    I&O's Summary    04 Jan 2020 07:01  -  05 Jan 2020 07:00  --------------------------------------------------------  IN: 1000 mL / OUT: 2351 mL / NET: -1351 mL    05 Jan 2020 07:01  -  05 Jan 2020 12:08  --------------------------------------------------------  IN: 0 mL / OUT: 70 mL / NET: -70 mL          LABS:                        8.6    26.55 )-----------( 621      ( 05 Jan 2020 06:32 )             25.6     01-05    135  |  92<L>  |  25<H>  ----------------------------<  64<L>  4.0   |  26  |  4.38<H>    Ca    9.0      05 Jan 2020 06:32  Phos  3.2     01-05  Mg     2.0     01-05    TPro  6.8  /  Alb  2.9<L>  /  TBili  1.0  /  DBili  0.4<H>  /  AST  13  /  ALT  <5<L>  /  AlkPhos  238<H>  01-05        CAPILLARY BLOOD GLUCOSE      POCT Blood Glucose.: 110 mg/dL (05 Jan 2020 08:01)    LIVER FUNCTIONS - ( 05 Jan 2020 06:32 )  Alb: 2.9 g/dL / Pro: 6.8 g/dL / ALK PHOS: 238 U/L / ALT: <5 U/L / AST: 13 U/L / GGT: x             Culture - Tissue with Gram Stain (collected 01-01-20 @ 17:12)  Source: .Tissue Other  Gram Stain (01-02-20 @ 00:25):    Rare polymorphonuclear leukocytes seen per low power field    Rare Gram Negative Rods seen per oil power field  Preliminary Report (01-02-20 @ 13:15):    Numerous Enterobacter cloacae complex  Organism: Enterobacter cloacae complex (01-03-20 @ 12:47)  Organism: Enterobacter cloacae complex (01-03-20 @ 12:47)    Culture - Surgical Swab (collected 01-01-20 @ 17:06)  Source: .Surgical Swab collection around right kidney  Preliminary Report (01-02-20 @ 13:16):    Moderate Enterobacter cloacae complex  Organism: Enterobacter cloacae complex (01-03-20 @ 09:42)  Organism: Enterobacter cloacae complex (01-03-20 @ 09:42)    Culture - Body Fluid with Gram Stain (collected 01-01-20 @ 16:59)  Source: .Body Fluid periphrenic collection  Gram Stain (01-02-20 @ 09:22):    Upon re-evaluation of gram stain: Rare Gram Negative Rods seen    No polymorphonuclear cells seen    by cytocentrifuge  Preliminary Report (01-05-20 @ 10:20):    Moderate Escherichia coli    Moderate Enterobacter cloacae  Organism: Escherichia coli  Enterobacter cloacae (Carbapenem Resistant) (01-05-20 @ 10:20)  Organism: Enterobacter cloacae (Carbapenem Resistant) (01-05-20 @ 10:20)  Organism: Escherichia coli (01-04-20 @ 09:25)    Culture - Surgical Swab (collected 01-01-20 @ 16:51)  Source: .Surgical Swab right kidney abscess  Preliminary Report (01-03-20 @ 09:41):    Moderate Enterobacter cloacae complex  Organism: Enterobacter cloacae complex (01-03-20 @ 09:40)  Organism: Enterobacter cloacae complex (01-03-20 @ 09:40)    Culture - Urine (collected 12-29-19 @ 22:02)  Source: .Urine Clean Catch (Midstream)  Final Report (12-31-19 @ 16:32):    >100,000 CFU/ml Enterobacter cloacae  Organism: Enterobacter cloacae (12-31-19 @ 16:32)  Organism: Enterobacter cloacae (12-31-19 @ 16:32)        Cultures:    Culture - Tissue with Gram Stain (collected 01-01-20 @ 17:12)  Source: .Tissue Other  Gram Stain (01-02-20 @ 00:25):    Rare polymorphonuclear leukocytes seen per low power field    Rare Gram Negative Rods seen per oil power field  Preliminary Report (01-02-20 @ 13:15):    Numerous Enterobacter cloacae complex  Organism: Enterobacter cloacae complex (01-03-20 @ 12:47)  Organism: Enterobacter cloacae complex (01-03-20 @ 12:47)    Culture - Surgical Swab (collected 01-01-20 @ 17:06)  Source: .Surgical Swab collection around right kidney  Preliminary Report (01-02-20 @ 13:16):    Moderate Enterobacter cloacae complex  Organism: Enterobacter cloacae complex (01-03-20 @ 09:42)  Organism: Enterobacter cloacae complex (01-03-20 @ 09:42)    Culture - Body Fluid with Gram Stain (collected 01-01-20 @ 16:59)  Source: .Body Fluid periphrenic collection  Gram Stain (01-02-20 @ 09:22):    Upon re-evaluation of gram stain: Rare Gram Negative Rods seen    No polymorphonuclear cells seen    by cytocentrifuge  Preliminary Report (01-05-20 @ 10:20):    Moderate Escherichia coli    Moderate Enterobacter cloacae  Organism: Escherichia coli  Enterobacter cloacae (Carbapenem Resistant) (01-05-20 @ 10:20)  Organism: Enterobacter cloacae (Carbapenem Resistant) (01-05-20 @ 10:20)  Organism: Escherichia coli (01-04-20 @ 09:25)    Culture - Surgical Swab (collected 01-01-20 @ 16:51)  Source: .Surgical Swab right kidney abscess  Preliminary Report (01-03-20 @ 09:41):    Moderate Enterobacter cloacae complex  Organism: Enterobacter cloacae complex (01-03-20 @ 09:40)  Organism: Enterobacter cloacae complex (01-03-20 @ 09:40)    Culture - Urine (collected 12-29-19 @ 22:02)  Source: .Urine Clean Catch (Midstream)  Final Report (12-31-19 @ 16:32):    >100,000 CFU/ml Enterobacter cloacae  Organism: Enterobacter cloacae (12-31-19 @ 16:32)  Organism: Enterobacter cloacae (12-31-19 @ 16:32)        REVIEW OF SYSTEMS  Gen: No weight changes, fatigue, fevers/chills, weakness  Skin: No rashes  Head/Eyes/Ears/Mouth: No headache; Normal hearing; Normal vision w/o blurriness; No sinus pain/discomfort, sore throat  Respiratory: No dyspnea, cough, wheezing, hemoptysis  CV: No chest pain, PND, orthopnea  GI: c/o incisional pain No diarrhea, constipation, nausea, vomiting, melena, hematochezia  : No increased frequency, dysuria, hematuria, nocturia  MSK: No joint pain/swelling; no back pain; no edema  Neuro: No dizziness/lightheadedness, weakness, seizures, numbness, tingling  Heme: No easy bruising or bleeding  Endo: No heat/cold intolerance  Psych: No significant nervousness, anxiety, stress, depression      PHYSICAL EXAM:  Constitutional: Well developed / well nourished  Eyes: Anicteric, PERRLA  ENMT: nc/at   Neck: Supple  Respiratory: CTA     Cardiovascular: RRR  Gastrointestinal: Soft abdomen, tender to palpation ND. incision w/ serosanguinous drainage, BO w/ minimal drainage  Genitourinary:  Voiding spontaneously/ dark pyuria noted   Extremities: SCD's in place and working bilaterally  Vascular: faintly palpable pulses b/l LE; 2+ pitting edema. R > L   Neurological: A&O x3  Skin: moderate strike through on the dressing, BO serosanguinous   Musculoskeletal: Moving all extremities Psychiatric: Responsive

## 2020-01-05 NOTE — PROGRESS NOTE ADULT - SUBJECTIVE AND OBJECTIVE BOX
INFECTIOUS DISEASES FOLLOW UP--Oj Finnegan MD  Pager [phone]    This is a follow up note for this  49y Male with  fever, perinephric collection/infection with e. cloacae.  Ongoing fevers and leukocytosis.  Moved to ICU this am  No new complaints.    Further ROS:  CONSTITUTIONAL: good appetite  CARDIOVASCULAR:  No chest pain or palpitations  RESPIRATORY:  No dyspnea  GASTROINTESTINAL:  mild R lower abd pain  NEUROLOGIC:  No headache,     Allergies  coconut (Anaphylaxis)  morphine (Pruritus; Rash)    ANTIBIOTICS/RELEVANT:  antimicrobials  fluconAZOLE   Tablet 200 milliGRAM(s) Oral daily  meropenem  IVPB 500 milliGRAM(s) IV Intermittent every 24 hours  trimethoprim   80 mG/sulfamethoxazole 400 mG 1 Tablet(s) Oral daily  valGANciclovir 450 milliGRAM(s) Oral <User Schedule>  vancomycin  IVPB 1000 milliGRAM(s) IV Intermittent once    immunologic:    OTHER:  acetaminophen   Tablet .. 650 milliGRAM(s) Oral every 6 hours PRN  allopurinol 100 milliGRAM(s) Oral daily  brimonidine 0.2% Ophthalmic Solution 1 Drop(s) Both EYES three times a day  dextrose 5% + sodium chloride 0.45%. 1000 milliLiter(s) IV Continuous <Continuous>  diphenhydrAMINE 25 milliGRAM(s) Oral once PRN  famotidine    Tablet 20 milliGRAM(s) Oral daily  gabapentin 100 milliGRAM(s) Oral two times a day  heparin  Injectable 5000 Unit(s) SubCutaneous every 12 hours  latanoprost 0.005% Ophthalmic Solution 1 Drop(s) Both EYES daily  predniSONE   Tablet 5 milliGRAM(s) Oral daily  senna 2 Tablet(s) Oral at bedtime  traMADol 50 milliGRAM(s) Oral every 6 hours PRN  traMADol 25 milliGRAM(s) Oral every 6 hours PRN      Objective:  Vital Signs Last 24 Hrs  T(C): 36.9 (05 Jan 2020 11:00), Max: 39.3 (05 Jan 2020 05:00)  T(F): 98.4 (05 Jan 2020 11:00), Max: 102.7 (05 Jan 2020 05:00)  HR: 69 (05 Jan 2020 11:00) (69 - 100)  BP: 108/61 (05 Jan 2020 11:00) (90/40 - 181/100)  BP(mean): 79 (05 Jan 2020 11:00) (79 - 79)  RR: 16 (05 Jan 2020 11:00) (16 - 20)  SpO2: 100% (05 Jan 2020 11:00) (96% - 100%)    PHYSICAL EXAM:  Constitutional:no acute distress  frail  Ear/Nose/Throat: no oral lesions, 	  Respiratory: clear BL  Cardiovascular: S1S2  Gastrointestinal:soft, (+) BS, mild tender RLQ, BO noted  Extremities:no e/e/c  No Lymphadenopathy  IV sites not inflammed.    LABS:                        8.6    26.55 )-----------( 621      ( 05 Jan 2020 06:32 )             25.6     01-05    135  |  92<L>  |  25<H>  ----------------------------<  64<L>  4.0   |  26  |  4.38<H>    Ca    9.0      05 Jan 2020 06:32  Phos  3.2     01-05  Mg     2.0     01-05    TPro  6.8  /  Alb  2.9<L>  /  TBili  1.0  /  DBili  0.4<H>  /  AST  13  /  ALT  <5<L>  /  AlkPhos  238<H>  01-05    MICROBIOLOGY: all fluid/tissue cx with e. cloacae    RADIOLOGY & ADDITIONAL STUDIES:    < from: CT Abdomen and Pelvis w/ Oral Cont (01.04.20 @ 13:53) >    Diffusely enlarged right lower quadrant transplant kidney with perinephric fluid collection inferior to the transplant kidney.  Postsurgical changes of the right anterior pelvis with a small amount of subcutaneous hematoma underlying the surgical skin staples.  Findings were discussed with Dr. Poe on1/4/2020 at 2:35 PM by Dr. Napier with read back confirmation.    < end of copied text >

## 2020-01-05 NOTE — PROGRESS NOTE ADULT - SUBJECTIVE AND OBJECTIVE BOX
--------------------------------------------------------------------------------  Chief Complaint:  No new symptoms  24 hour events/subjective:    Reviewed  Noted imaging studies, Fever, tachyarrhythmia    PAST HISTORY  --------------------------------------------------------------------------------  No significant changes to PMH, PSH, FHx, SHx, unless otherwise noted    ALLERGIES & MEDICATIONS  --------------------------------------------------------------------------------  Allergies    coconut (Anaphylaxis)  morphine (Pruritus; Rash)    Intolerances      Standing Inpatient Medications  allopurinol 100 milliGRAM(s) Oral daily  brimonidine 0.2% Ophthalmic Solution 1 Drop(s) Both EYES three times a day  dextrose 5% + sodium chloride 0.45%. 1000 milliLiter(s) IV Continuous <Continuous>  famotidine    Tablet 20 milliGRAM(s) Oral daily  fluconAZOLE   Tablet 200 milliGRAM(s) Oral daily  gabapentin 100 milliGRAM(s) Oral two times a day  heparin  Injectable 5000 Unit(s) SubCutaneous every 12 hours  latanoprost 0.005% Ophthalmic Solution 1 Drop(s) Both EYES daily  meropenem  IVPB 500 milliGRAM(s) IV Intermittent every 24 hours  predniSONE   Tablet 5 milliGRAM(s) Oral daily  senna 2 Tablet(s) Oral at bedtime  trimethoprim   80 mG/sulfamethoxazole 400 mG 1 Tablet(s) Oral daily  valGANciclovir 450 milliGRAM(s) Oral <User Schedule>  vancomycin  IVPB 1000 milliGRAM(s) IV Intermittent once    PRN Inpatient Medications  acetaminophen   Tablet .. 650 milliGRAM(s) Oral every 6 hours PRN  diphenhydrAMINE 25 milliGRAM(s) Oral once PRN  traMADol 50 milliGRAM(s) Oral every 6 hours PRN  traMADol 25 milliGRAM(s) Oral every 6 hours PRN      REVIEW OF SYSTEMS  --------------------------------------------------------------------------------  Gen: fatigue, fever  Skin: No rashes  Head/Eyes/Ears/Mouth:    CV: No chest pain, PND, orthopnea     Neuro: No dizziness, seizures     VITALS/PHYSICAL EXAM  --------------------------------------------------------------------------------  T(C): 36.9 (01-05-20 @ 11:00), Max: 39.3 (01-05-20 @ 05:00)  HR: 69 (01-05-20 @ 11:00) (69 - 100)  BP: 108/61 (01-05-20 @ 11:00) (90/40 - 181/100)  RR: 16 (01-05-20 @ 11:00) (16 - 20)  SpO2: 100% (01-05-20 @ 11:00) (96% - 100%)           01-04-20 @ 07:01  -  01-05-20 @ 07:00  --------------------------------------------------------  IN: 1000 mL / OUT: 2351 mL / NET: -1351 mL    01-05-20 @ 07:01  -  01-05-20 @ 11:06  --------------------------------------------------------  IN: 0 mL / OUT: 70 mL / NET: -70 mL      Physical Exam:  	Gen: Ana cute distress  	HEENT: No acute signs  	Pulm: CTA B/L  	CV: RRR, S1S2; no rub  	Back: No spinal or CVA tenderness; no sacral edema  	Abd: +BS, soft, nontender/nondistended                      Transplant: Staples+, Drain+  	LE: bilateral ankle edema+  	Neuro: No acute focal deficit   	Psych: Normal/appropriate affect and mood  	Skin: Warm, without rashes      LABS/STUDIES  --------------------------------------------------------------------------------              8.6    26.55 >-----------<  621      [01-05-20 @ 06:32]              25.6     135  |  92  |  25  ----------------------------<  64      [01-05-20 @ 06:32]  4.0   |  26  |  4.38        Ca     9.0     [01-05-20 @ 06:32]      Mg     2.0     [01-05-20 @ 06:32]      Phos  3.2     [01-05-20 @ 06:32]    TPro  6.8  /  Alb  2.9  /  TBili  1.0  /  DBili  0.4  /  AST  13  /  ALT  <5  /  AlkPhos  238  [01-05-20 @ 06:32]          Creatinine Trend:  SCr 4.38 [01-05 @ 06:32]  SCr 6.86 [01-04 @ 21:16]  SCr 6.22 [01-04 @ 06:58]  SCr 4.80 [01-03 @ 06:11]  SCr 6.88 [01-02 @ 06:33]              Urinalysis - [12-29-19 @ 03:40]      Color Dark Brown / Appearance Turbid / SG 1.021 / pH 7.5      Gluc Negative / Ketone Trace  / Bili Small / Urobili <2 mg/dL       Blood Large / Protein 100 mg/dL / Leuk Est Large / Nitrite Negative      RBC >720 / WBC >720 / Hyaline 5 / Gran  / Sq Epi  / Non Sq Epi 8 / Bacteria Negative      HbA1c 5.0      [12-11-19 @ 08:21]    HBsAg Nonreact      [12-12-19 @ 22:37]  HCV 0.15, Nonreact      [12-12-19 @ 22:37]

## 2020-01-05 NOTE — PROGRESS NOTE ADULT - ASSESSMENT
49M PMHx of ESRD on HD  ( via LUE AVF), HTN, Gout, Glaucoma, NET of pancreas (s/p robotic distal panc/splenectomy at [place] 2015 by Dr. Young, followed by Dr. Raphael, [facility] Oncologist), RA with hand contractures. 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 Post op course c/b DGF requiring HD, thrombocytopenia, elevated LDH and Haptoglobulin. Schistocytes  on peripheral smear concerning for TMA. Envarsus held. Received Belatacept 12/13. Started on PLEX (12/12, 12/15). Underwent renal bx on 12/13 c/w profound ATN. Re-admitted with Influenza,  s/p hematoma evacuation/ abd washout and renal biopsy now with leukocytosis and fevers.       Influenza  - RVP + influenza A  - Completed Tamiflu      s/p DDRT c/b DGF/ATN   - US on admission with increased perinephric fluid collection  - POD 4 s/p ex lap/washout and renal txp bx (prelim c/w ATN, no rejection)   - Immuno: Belatacept (last d ose 12/28, Next dose 1/6), Pred 5 (decreased from 10mg)  - MMF on hold 2/2 uptrending WBC  - b/l LE dopplers neg dvt  - PPx: bactrim/valcyte  - start fluconazole 200 mg QD hold nystatin   - on  SQ heparin 5000 units BID   - Strict I/Os  - Renal diet  - Bowel regimen  - SCDs , IS, encourage ambulation     ID: UTI/Infected Hematoma, cxs grew Enterobacter cloacae   - hypotensive sbp 90s Tx to SICU for further management   -ID consulted appreciate recs  - IR consulted for drainage of abcess  - Fever overnight -cont meropenem     - body fluid cult now pos Ecoli sensitivities pending,  - initial blood cultures neg   - send blood cult x 2 during HD yesterday   - CT scan w/ oral contrast Abd/ pelvis- residual collection  - Cardiology consult for episode of wide complex tachycardia    HTN,  -hypotensive  - evaluate continued use of  Metoprolol 12.5 BID   - hold  hydralazine

## 2020-01-05 NOTE — PROGRESS NOTE ADULT - ATTENDING COMMENTS
On rounds today surgical incision was evaluated.  1 staple removed and q tip was inserted, no evidence of subcutaneous collection or abscess,  culture swab sent to lab. After rounds Mr. Prado became hypotensive SBP 90s.  Discussed case with SICU team- Dr. Dawkins and pt transferred for further management.

## 2020-01-05 NOTE — CONSULT NOTE ADULT - ASSESSMENT
ASSESSMENT:  KAYLA PA is a 49y Male with history of ESRD on HD  (via LUE AVF), HTN, Gout, Glaucoma, PNET s/p robotic distal panc/splenectomy (at [place] 2015 by Dr. Young, followed by Dr. Raphael, [facility] Oncologist), RA with hand contractures who underwent DDRT on 12/8/19 (ureteral stent sutured to Kennedy - removed 12/15). Post op course c/b DGF requiring HD, thrombocytopenia, elevated LDH and Haptoglobulin. Schistocytes  on peripheral smear concerning for TMA. Envarsus held. Underwent renal bx on 12/13 c/w profound ATN. 1/1/2020 was taken to OR and found to have a perinephric hematoma. Underwent washout, evacuation of hematoma, and biopsy of transplanted kidney. He was recovering on the floor when overnight he spiked a fever to 38.1 and had 18 beats of wide complex tachycardia. He was also found to have worsening leukocytosis and became acutely hypotensive. A CT scan was obtained deomnstrating a 4.5x3.7 perinephric collection. IR was consulted for drainage and SICU was consulted for hemodynamic monitoring.    PLAN:    NEURO:  - Tylenol 650 mg q6 prn (mild)  - Tramadol 25/50 mg q6 prn (mod/severe)  - Gabapentin 100 mg BID    RESPIRATORY:   - No active issues  - OOB as tolerated, PT    CARDIOVASCULAR:  - Monitor hemodynamics  - f/u cards recs for wide complex tachycardia  - Hold home hydralazine, metoprolol    GI/NUTRITION:  - NPO for percutaneous drainage by IR  - Regular diet after  - Famotidine 20 mg daily  - Senna 2 tabs qhs  - Transplant meds: prednisone 5 mg daily, allopurinol 100 mg daily  - MMF per transplant team    GENITOURINARY/RENAL:  - D5 1/2NS @50cc/hr  - Monitor electrolytes  - Continue w/ HD for now  - f/u renal biopsy pathology    HEMATOLOGIC:  - SQH for VTE ppx  - Trend H/H  - LE duplex neg for DVT    INFECTIOUS DISEASE:  - Meropenem 500 mg daily  - PPX: fluconazole 200 mg daily, valganciclovir 450 mg three times weekly, bactrim 1 tab daily    ENDOCRINE:  - Monitor glucose on BMP    OPHTHALMOLOGY:  - Brimonidine/latanoprost drops    LIN Wall, PGY-2  SICU [id number]

## 2020-01-06 ENCOUNTER — APPOINTMENT (OUTPATIENT)
Dept: NEPHROLOGY | Facility: CLINIC | Age: 50
End: 2020-01-06

## 2020-01-06 ENCOUNTER — APPOINTMENT (OUTPATIENT)
Dept: RHEUMATOLOGY | Facility: CLINIC | Age: 50
End: 2020-01-06

## 2020-01-06 LAB
ALBUMIN SERPL ELPH-MCNC: 2.8 G/DL — LOW (ref 3.3–5)
ALBUMIN SERPL ELPH-MCNC: 3 G/DL — LOW (ref 3.3–5)
ALP SERPL-CCNC: 201 U/L — HIGH (ref 40–120)
ALP SERPL-CCNC: 216 U/L — HIGH (ref 40–120)
ALT FLD-CCNC: 6 U/L — LOW (ref 10–45)
ALT FLD-CCNC: <5 U/L — LOW (ref 10–45)
ANION GAP SERPL CALC-SCNC: 14 MMOL/L — SIGNIFICANT CHANGE UP (ref 5–17)
ANION GAP SERPL CALC-SCNC: 18 MMOL/L — HIGH (ref 5–17)
APTT BLD: 32.1 SEC — SIGNIFICANT CHANGE UP (ref 27.5–36.3)
APTT BLD: 32.5 SEC — SIGNIFICANT CHANGE UP (ref 27.5–36.3)
AST SERPL-CCNC: 12 U/L — SIGNIFICANT CHANGE UP (ref 10–40)
AST SERPL-CCNC: 16 U/L — SIGNIFICANT CHANGE UP (ref 10–40)
BASOPHILS # BLD AUTO: 0.08 K/UL — SIGNIFICANT CHANGE UP (ref 0–0.2)
BASOPHILS NFR BLD AUTO: 0.3 % — SIGNIFICANT CHANGE UP (ref 0–2)
BILIRUB DIRECT SERPL-MCNC: 0.3 MG/DL — HIGH (ref 0–0.2)
BILIRUB DIRECT SERPL-MCNC: 0.3 MG/DL — HIGH (ref 0–0.2)
BILIRUB INDIRECT FLD-MCNC: 0.3 MG/DL — SIGNIFICANT CHANGE UP (ref 0.2–1)
BILIRUB INDIRECT FLD-MCNC: 0.3 MG/DL — SIGNIFICANT CHANGE UP (ref 0.2–1)
BILIRUB SERPL-MCNC: 0.6 MG/DL — SIGNIFICANT CHANGE UP (ref 0.2–1.2)
BILIRUB SERPL-MCNC: 0.6 MG/DL — SIGNIFICANT CHANGE UP (ref 0.2–1.2)
BLD GP AB SCN SERPL QL: NEGATIVE — SIGNIFICANT CHANGE UP
BUN SERPL-MCNC: 38 MG/DL — HIGH (ref 7–23)
BUN SERPL-MCNC: 41 MG/DL — HIGH (ref 7–23)
CALCIUM SERPL-MCNC: 8.4 MG/DL — SIGNIFICANT CHANGE UP (ref 8.4–10.5)
CALCIUM SERPL-MCNC: 8.9 MG/DL — SIGNIFICANT CHANGE UP (ref 8.4–10.5)
CHLORIDE SERPL-SCNC: 91 MMOL/L — LOW (ref 96–108)
CHLORIDE SERPL-SCNC: 92 MMOL/L — LOW (ref 96–108)
CO2 SERPL-SCNC: 20 MMOL/L — LOW (ref 22–31)
CO2 SERPL-SCNC: 26 MMOL/L — SIGNIFICANT CHANGE UP (ref 22–31)
CREAT FLD-MCNC: 6.56 MG/DL — SIGNIFICANT CHANGE UP
CREAT SERPL-MCNC: 6.37 MG/DL — HIGH (ref 0.5–1.3)
CREAT SERPL-MCNC: 7.06 MG/DL — HIGH (ref 0.5–1.3)
CULTURE RESULTS: SIGNIFICANT CHANGE UP
EOSINOPHIL # BLD AUTO: 0.25 K/UL — SIGNIFICANT CHANGE UP (ref 0–0.5)
EOSINOPHIL NFR BLD AUTO: 1 % — SIGNIFICANT CHANGE UP (ref 0–6)
GLUCOSE SERPL-MCNC: 135 MG/DL — HIGH (ref 70–99)
GLUCOSE SERPL-MCNC: 98 MG/DL — SIGNIFICANT CHANGE UP (ref 70–99)
GRAM STN FLD: SIGNIFICANT CHANGE UP
HCT VFR BLD CALC: 23.9 % — LOW (ref 39–50)
HCT VFR BLD CALC: 25.7 % — LOW (ref 39–50)
HGB BLD-MCNC: 7.8 G/DL — LOW (ref 13–17)
HGB BLD-MCNC: 8.4 G/DL — LOW (ref 13–17)
IMM GRANULOCYTES NFR BLD AUTO: 2.5 % — HIGH (ref 0–1.5)
INR BLD: 1.26 RATIO — HIGH (ref 0.88–1.16)
INR BLD: 1.28 RATIO — HIGH (ref 0.88–1.16)
LYMPHOCYTES # BLD AUTO: 2.12 K/UL — SIGNIFICANT CHANGE UP (ref 1–3.3)
LYMPHOCYTES # BLD AUTO: 8.3 % — LOW (ref 13–44)
MAGNESIUM SERPL-MCNC: 2 MG/DL — SIGNIFICANT CHANGE UP (ref 1.6–2.6)
MAGNESIUM SERPL-MCNC: 2 MG/DL — SIGNIFICANT CHANGE UP (ref 1.6–2.6)
MCHC RBC-ENTMCNC: 31 PG — SIGNIFICANT CHANGE UP (ref 27–34)
MCHC RBC-ENTMCNC: 31.2 PG — SIGNIFICANT CHANGE UP (ref 27–34)
MCHC RBC-ENTMCNC: 32.6 GM/DL — SIGNIFICANT CHANGE UP (ref 32–36)
MCHC RBC-ENTMCNC: 32.7 GM/DL — SIGNIFICANT CHANGE UP (ref 32–36)
MCV RBC AUTO: 94.8 FL — SIGNIFICANT CHANGE UP (ref 80–100)
MCV RBC AUTO: 95.5 FL — SIGNIFICANT CHANGE UP (ref 80–100)
MONOCYTES # BLD AUTO: 2.27 K/UL — HIGH (ref 0–0.9)
MONOCYTES NFR BLD AUTO: 8.9 % — SIGNIFICANT CHANGE UP (ref 2–14)
NEUTROPHILS # BLD AUTO: 20.25 K/UL — HIGH (ref 1.8–7.4)
NEUTROPHILS NFR BLD AUTO: 79 % — HIGH (ref 43–77)
NRBC # BLD: 0 /100 WBCS — SIGNIFICANT CHANGE UP (ref 0–0)
NRBC # BLD: 0 /100 WBCS — SIGNIFICANT CHANGE UP (ref 0–0)
ORGANISM # SPEC MICROSCOPIC CNT: SIGNIFICANT CHANGE UP
PHOSPHATE SERPL-MCNC: 4.8 MG/DL — HIGH (ref 2.5–4.5)
PHOSPHATE SERPL-MCNC: 5.9 MG/DL — HIGH (ref 2.5–4.5)
PLATELET # BLD AUTO: 585 K/UL — HIGH (ref 150–400)
PLATELET # BLD AUTO: 592 K/UL — HIGH (ref 150–400)
POTASSIUM SERPL-MCNC: 4.3 MMOL/L — SIGNIFICANT CHANGE UP (ref 3.5–5.3)
POTASSIUM SERPL-MCNC: 4.5 MMOL/L — SIGNIFICANT CHANGE UP (ref 3.5–5.3)
POTASSIUM SERPL-SCNC: 4.3 MMOL/L — SIGNIFICANT CHANGE UP (ref 3.5–5.3)
POTASSIUM SERPL-SCNC: 4.5 MMOL/L — SIGNIFICANT CHANGE UP (ref 3.5–5.3)
PROT SERPL-MCNC: 6.3 G/DL — SIGNIFICANT CHANGE UP (ref 6–8.3)
PROT SERPL-MCNC: 6.5 G/DL — SIGNIFICANT CHANGE UP (ref 6–8.3)
PROTHROM AB SERPL-ACNC: 14.6 SEC — HIGH (ref 10–12.9)
PROTHROM AB SERPL-ACNC: 14.7 SEC — HIGH (ref 10–12.9)
RBC # BLD: 2.52 M/UL — LOW (ref 4.2–5.8)
RBC # BLD: 2.69 M/UL — LOW (ref 4.2–5.8)
RBC # FLD: 17 % — HIGH (ref 10.3–14.5)
RBC # FLD: 17.2 % — HIGH (ref 10.3–14.5)
RH IG SCN BLD-IMP: POSITIVE — SIGNIFICANT CHANGE UP
SODIUM SERPL-SCNC: 130 MMOL/L — LOW (ref 135–145)
SODIUM SERPL-SCNC: 131 MMOL/L — LOW (ref 135–145)
SPECIMEN SOURCE: SIGNIFICANT CHANGE UP
SURGICAL PATHOLOGY STUDY: SIGNIFICANT CHANGE UP
VANCOMYCIN FLD-MCNC: 16.3 UG/ML — SIGNIFICANT CHANGE UP
WBC # BLD: 19.79 K/UL — HIGH (ref 3.8–10.5)
WBC # BLD: 25.6 K/UL — HIGH (ref 3.8–10.5)
WBC # FLD AUTO: 19.79 K/UL — HIGH (ref 3.8–10.5)
WBC # FLD AUTO: 25.6 K/UL — HIGH (ref 3.8–10.5)

## 2020-01-06 PROCEDURE — 77012 CT SCAN FOR NEEDLE BIOPSY: CPT | Mod: 26

## 2020-01-06 PROCEDURE — 99233 SBSQ HOSP IP/OBS HIGH 50: CPT

## 2020-01-06 PROCEDURE — 99223 1ST HOSP IP/OBS HIGH 75: CPT

## 2020-01-06 PROCEDURE — 99232 SBSQ HOSP IP/OBS MODERATE 35: CPT | Mod: GC

## 2020-01-06 PROCEDURE — 10160 PNXR ASPIR ABSC HMTMA BULLA: CPT

## 2020-01-06 RX ORDER — HYDROMORPHONE HYDROCHLORIDE 2 MG/ML
0.25 INJECTION INTRAMUSCULAR; INTRAVENOUS; SUBCUTANEOUS EVERY 4 HOURS
Refills: 0 | Status: DISCONTINUED | OUTPATIENT
Start: 2020-01-06 | End: 2020-01-07

## 2020-01-06 RX ORDER — HYDROMORPHONE HYDROCHLORIDE 2 MG/ML
0.5 INJECTION INTRAMUSCULAR; INTRAVENOUS; SUBCUTANEOUS ONCE
Refills: 0 | Status: DISCONTINUED | OUTPATIENT
Start: 2020-01-06 | End: 2020-01-06

## 2020-01-06 RX ORDER — FLUCONAZOLE 150 MG/1
400 TABLET ORAL DAILY
Refills: 0 | Status: DISCONTINUED | OUTPATIENT
Start: 2020-01-06 | End: 2020-01-10

## 2020-01-06 RX ORDER — ACETAMINOPHEN 500 MG
1000 TABLET ORAL ONCE
Refills: 0 | Status: COMPLETED | OUTPATIENT
Start: 2020-01-06 | End: 2020-01-06

## 2020-01-06 RX ORDER — SODIUM CHLORIDE 9 MG/ML
1000 INJECTION, SOLUTION INTRAVENOUS
Refills: 0 | Status: DISCONTINUED | OUTPATIENT
Start: 2020-01-07 | End: 2020-01-07

## 2020-01-06 RX ORDER — SODIUM CHLORIDE 9 MG/ML
1000 INJECTION, SOLUTION INTRAVENOUS
Refills: 0 | Status: DISCONTINUED | OUTPATIENT
Start: 2020-01-06 | End: 2020-01-06

## 2020-01-06 RX ORDER — HYDROMORPHONE HYDROCHLORIDE 2 MG/ML
0.25 INJECTION INTRAMUSCULAR; INTRAVENOUS; SUBCUTANEOUS ONCE
Refills: 0 | Status: DISCONTINUED | OUTPATIENT
Start: 2020-01-06 | End: 2020-01-06

## 2020-01-06 RX ORDER — LIDOCAINE HCL 20 MG/ML
1 VIAL (ML) INJECTION
Refills: 0 | Status: DISCONTINUED | OUTPATIENT
Start: 2020-01-06 | End: 2020-01-12

## 2020-01-06 RX ORDER — HYDROMORPHONE HYDROCHLORIDE 2 MG/ML
0.5 INJECTION INTRAMUSCULAR; INTRAVENOUS; SUBCUTANEOUS
Refills: 0 | Status: DISCONTINUED | OUTPATIENT
Start: 2020-01-06 | End: 2020-01-06

## 2020-01-06 RX ADMIN — TRAMADOL HYDROCHLORIDE 50 MILLIGRAM(S): 50 TABLET ORAL at 12:00

## 2020-01-06 RX ADMIN — HYDROMORPHONE HYDROCHLORIDE 0.5 MILLIGRAM(S): 2 INJECTION INTRAMUSCULAR; INTRAVENOUS; SUBCUTANEOUS at 20:40

## 2020-01-06 RX ADMIN — Medication 1 TABLET(S): at 11:39

## 2020-01-06 RX ADMIN — GABAPENTIN 100 MILLIGRAM(S): 400 CAPSULE ORAL at 17:52

## 2020-01-06 RX ADMIN — TRAMADOL HYDROCHLORIDE 50 MILLIGRAM(S): 50 TABLET ORAL at 11:39

## 2020-01-06 RX ADMIN — Medication 5 MILLIGRAM(S): at 05:28

## 2020-01-06 RX ADMIN — TRAMADOL HYDROCHLORIDE 25 MILLIGRAM(S): 50 TABLET ORAL at 20:58

## 2020-01-06 RX ADMIN — HYDROMORPHONE HYDROCHLORIDE 0.25 MILLIGRAM(S): 2 INJECTION INTRAMUSCULAR; INTRAVENOUS; SUBCUTANEOUS at 20:30

## 2020-01-06 RX ADMIN — HEPARIN SODIUM 5000 UNIT(S): 5000 INJECTION INTRAVENOUS; SUBCUTANEOUS at 05:28

## 2020-01-06 RX ADMIN — Medication 100 MILLIGRAM(S): at 11:39

## 2020-01-06 RX ADMIN — BRIMONIDINE TARTRATE 1 DROP(S): 2 SOLUTION/ DROPS OPHTHALMIC at 21:09

## 2020-01-06 RX ADMIN — BRIMONIDINE TARTRATE 1 DROP(S): 2 SOLUTION/ DROPS OPHTHALMIC at 05:27

## 2020-01-06 RX ADMIN — GABAPENTIN 100 MILLIGRAM(S): 400 CAPSULE ORAL at 05:28

## 2020-01-06 RX ADMIN — TRAMADOL HYDROCHLORIDE 50 MILLIGRAM(S): 50 TABLET ORAL at 18:28

## 2020-01-06 RX ADMIN — SODIUM CHLORIDE 30 MILLILITER(S): 9 INJECTION, SOLUTION INTRAVENOUS at 09:52

## 2020-01-06 RX ADMIN — HYDROMORPHONE HYDROCHLORIDE 0.5 MILLIGRAM(S): 2 INJECTION INTRAMUSCULAR; INTRAVENOUS; SUBCUTANEOUS at 22:29

## 2020-01-06 RX ADMIN — HYDROMORPHONE HYDROCHLORIDE 0.5 MILLIGRAM(S): 2 INJECTION INTRAMUSCULAR; INTRAVENOUS; SUBCUTANEOUS at 12:56

## 2020-01-06 RX ADMIN — Medication 1000 MILLIGRAM(S): at 12:00

## 2020-01-06 RX ADMIN — CHLORHEXIDINE GLUCONATE 1 APPLICATION(S): 213 SOLUTION TOPICAL at 05:27

## 2020-01-06 RX ADMIN — Medication 400 MILLIGRAM(S): at 11:40

## 2020-01-06 RX ADMIN — TRAMADOL HYDROCHLORIDE 25 MILLIGRAM(S): 50 TABLET ORAL at 21:28

## 2020-01-06 RX ADMIN — FAMOTIDINE 20 MILLIGRAM(S): 10 INJECTION INTRAVENOUS at 11:39

## 2020-01-06 RX ADMIN — Medication 400 MILLIGRAM(S): at 22:01

## 2020-01-06 RX ADMIN — MEROPENEM 100 MILLIGRAM(S): 1 INJECTION INTRAVENOUS at 21:08

## 2020-01-06 RX ADMIN — HYDROMORPHONE HYDROCHLORIDE 0.5 MILLIGRAM(S): 2 INJECTION INTRAMUSCULAR; INTRAVENOUS; SUBCUTANEOUS at 20:55

## 2020-01-06 RX ADMIN — HEPARIN SODIUM 5000 UNIT(S): 5000 INJECTION INTRAVENOUS; SUBCUTANEOUS at 17:52

## 2020-01-06 RX ADMIN — HYDROMORPHONE HYDROCHLORIDE 0.5 MILLIGRAM(S): 2 INJECTION INTRAMUSCULAR; INTRAVENOUS; SUBCUTANEOUS at 12:40

## 2020-01-06 RX ADMIN — BRIMONIDINE TARTRATE 1 DROP(S): 2 SOLUTION/ DROPS OPHTHALMIC at 14:17

## 2020-01-06 RX ADMIN — TRAMADOL HYDROCHLORIDE 50 MILLIGRAM(S): 50 TABLET ORAL at 06:00

## 2020-01-06 RX ADMIN — HYDROMORPHONE HYDROCHLORIDE 0.5 MILLIGRAM(S): 2 INJECTION INTRAMUSCULAR; INTRAVENOUS; SUBCUTANEOUS at 22:44

## 2020-01-06 RX ADMIN — VALGANCICLOVIR 450 MILLIGRAM(S): 450 TABLET, FILM COATED ORAL at 05:28

## 2020-01-06 RX ADMIN — Medication 1000 MILLIGRAM(S): at 22:16

## 2020-01-06 RX ADMIN — Medication 1 MILLILITER(S): at 23:15

## 2020-01-06 RX ADMIN — LATANOPROST 1 DROP(S): 0.05 SOLUTION/ DROPS OPHTHALMIC; TOPICAL at 11:45

## 2020-01-06 RX ADMIN — TRAMADOL HYDROCHLORIDE 50 MILLIGRAM(S): 50 TABLET ORAL at 05:31

## 2020-01-06 RX ADMIN — HYDROMORPHONE HYDROCHLORIDE 0.25 MILLIGRAM(S): 2 INJECTION INTRAMUSCULAR; INTRAVENOUS; SUBCUTANEOUS at 20:45

## 2020-01-06 RX ADMIN — FLUCONAZOLE 400 MILLIGRAM(S): 150 TABLET ORAL at 14:17

## 2020-01-06 RX ADMIN — SODIUM CHLORIDE 30 MILLILITER(S): 9 INJECTION, SOLUTION INTRAVENOUS at 05:28

## 2020-01-06 RX ADMIN — TRAMADOL HYDROCHLORIDE 50 MILLIGRAM(S): 50 TABLET ORAL at 18:58

## 2020-01-06 NOTE — CONSULT NOTE ADULT - SUBJECTIVE AND OBJECTIVE BOX
HPI  49 year old male with PMH of malleable penile prosthesis, ESRD on HD s/p DDRT on 12/8/19. Post op course c/b DGF. Underwent renal bx on 12/13 with finding of ATN. Later found to have a perirenal hematoma/abscess and went to OR on 1/1 for drainage/washout. Today patient went to IR for aspiration of persistent collection seen inferior to pelvic kidney. Postop, patient had new onset hematuria with clots. When seen at bedside, patient was complaining of inability to urinate and sensation of bladder discomfort. He has never had hematuria like this before.     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  Carpal tunnel syndrome  Brain cyst: had MRI recently- now gone  Anemia  Gastric ulcer: Long time ago  Rheumatoid arthritis  Renal transplant recipient  Breakdown (mechanical) of penile (implanted) prosthesis, sequela  End-stage renal disease (ESRD): PERMACATH PLACEMENT  Abscess of left buttock: s/p I &amp; D  AV fistula: placement left lower arm  S/P cystoscopy: stent placement &amp; removal for kidney stones, lithiotripsy  s/p Lt Carpal tunnel: 2011      MEDICATIONS  (STANDING):  allopurinol 100 milliGRAM(s) Oral daily  brimonidine 0.2% Ophthalmic Solution 1 Drop(s) Both EYES three times a day  chlorhexidine 2% Cloths 1 Application(s) Topical <User Schedule>  famotidine    Tablet 20 milliGRAM(s) Oral daily  fluconAZOLE   Tablet 400 milliGRAM(s) Oral daily  gabapentin 100 milliGRAM(s) Oral two times a day  latanoprost 0.005% Ophthalmic Solution 1 Drop(s) Both EYES daily  meropenem  IVPB 500 milliGRAM(s) IV Intermittent every 24 hours  polyethylene glycol 3350 17 Gram(s) Oral daily  predniSONE   Tablet 5 milliGRAM(s) Oral daily  senna 2 Tablet(s) Oral at bedtime  trimethoprim   80 mG/sulfamethoxazole 400 mG 1 Tablet(s) Oral daily  valGANciclovir 450 milliGRAM(s) Oral <User Schedule>    MEDICATIONS  (PRN):  acetaminophen   Tablet .. 650 milliGRAM(s) Oral every 6 hours PRN Mild Pain (1 - 3)  HYDROmorphone  Injectable 0.5 milliGRAM(s) IV Push every 3 hours PRN Severe Pain (7 - 10)  lidocaine 2% Jelly 1 milliLiter(s) IntraUrethral two times a day PRN irritation  traMADol 50 milliGRAM(s) Oral every 6 hours PRN Severe Pain (7 - 10)  traMADol 25 milliGRAM(s) Oral every 6 hours PRN Moderate Pain (4 - 6)      FAMILY HISTORY:  Family history of myocardial infarction in first degree male relative  Family history of rheumatoid arthritis  Family history of hypertension in mother  Family history of cerebral aneurysm (Sibling)      Allergies  coconut (Anaphylaxis)  morphine (Pruritus; Rash)        REVIEW OF SYSTEMS:   Otherwise negative as stated in HPI    Physical Exam  Vital signs  T(C): 36.5 (01-06-20 @ 19:00), Max: 38.2 (01-06-20 @ 07:00)  HR: 75 (01-06-20 @ 22:00)  BP: 167/84 (01-06-20 @ 22:00)  SpO2: 99% (01-06-20 @ 22:00)  Wt(kg): --    Output    OUT:    Voided: 230 mL  Total OUT: 230 mL    Total NET: -230 mL      OUT:    Voided: 700 mL  Total OUT: 700 mL    Total NET: -700 mL          Gen:  NAD    Pulm:  No respiratory distress  	  CV:  RRR    GI:  Soft, tender to SP area    :  Malleable prothesis        LABS:  01-06 @ 20:45    WBC 19.79 / Hct 23.9  / SCr 7.06     01-06 @ 04:55    WBC 25.60 / Hct 25.7  / SCr 6.37     01-06    130<L>  |  92<L>  |  41<H>  ----------------------------<  135<H>  4.5   |  20<L>  |  7.06<H>    Ca    8.4      06 Jan 2020 20:45  Phos  5.9     01-06  Mg     2.0     01-06    TPro  6.3  /  Alb  2.8<L>  /  TBili  0.6  /  DBili  0.3<H>  /  AST  16  /  ALT  6<L>  /  AlkPhos  201<H>  01-06    PT/INR - ( 06 Jan 2020 20:45 )   PT: 14.6 sec;   INR: 1.26 ratio         PTT - ( 06 Jan 2020 20:45 )  PTT:32.1 sec      Urine Cx: Pending

## 2020-01-06 NOTE — PROGRESS NOTE ADULT - SUBJECTIVE AND OBJECTIVE BOX
Interventional Radiology Brief- Operative Note    Procedure: CT guided pelvic fluid aspiration    Operators: Dieudonne    Anesthesia (type): MAC. Lidocaine local    Contrast:  none    EBL: none    Findings/Follow up Plan of Care: CT guided aspiration of pelvic perinephric fluid collection performed. Appx 18cc clear pale yellow fluid aspirated. No drain inserted at this time. Findings discussed with SICU PA. Pt tolerated procedure without difficulty. Full report to follow.    Specimens Removed: sample collected for microbiology and creatinine    Implants: none    Complications: none    Condition/Disposition: stable / sicu    Please call Interventional Radiology x [phone] with any questions, concerns, or issues.

## 2020-01-06 NOTE — PROGRESS NOTE ADULT - ATTENDING COMMENTS
Kidney recipient with renal failure, dialysis dependent, DGF  TMA, improved, ATN, CNI free immunosuppression  S/p washout of carlos allograft hematoma  Persistent collection  Fever, tachy arrhythmia  Plan:  On minimized immunosuppression, Prednisone, hold Belatacept  Follow blood culture  Continue antimicrobial coverage  Drainage of persistent collection  Will follow  I was present during and reviewed clinical and lab data as well as assessment and plan as documented by the housestaff as noted. Please contact if any additional questions with any change in clinical condition or on availability of any additional information or reports.

## 2020-01-06 NOTE — PROGRESS NOTE ADULT - SUBJECTIVE AND OBJECTIVE BOX
[facility] DIVISION OF KIDNEY DISEASES AND HYPERTENSION -- FOLLOW UP NOTE  --------------------------------------------------------------------------------  HPI: 50 yo M with hx of ESRD on HD, s/p DDRT on 12/8, elevated SCr on HD using AVF, complicated by TMA related to CNI, now on belatacept, admitted with influenza. Pt s/p washout of infected collection and biopsy (1/1/20) which revealed ATN. Last HD treatment completed on 1/4/20. Pt. transferred to SICU for hemodynamic monitoring in the setting of fever, tachyarrhythmia, and CT imaging showing diffusely enlarged perinephric collection. Pt. with UOP in past 24 hours of ~70 cc.    Pt. seen and examined at bedside this AM in SICU. Pt. states that he has some pain but is comfortable. Denies CP, SOB, N/V/F/C.    PAST HISTORY  --------------------------------------------------------------------------------  No significant changes to PMH, PSH, FHx, SHx, unless otherwise noted    ALLERGIES & MEDICATIONS  --------------------------------------------------------------------------------  Allergies    coconut (Anaphylaxis)  morphine (Pruritus; Rash)    Intolerances      Standing Inpatient Medications  allopurinol 100 milliGRAM(s) Oral daily  brimonidine 0.2% Ophthalmic Solution 1 Drop(s) Both EYES three times a day  chlorhexidine 2% Cloths 1 Application(s) Topical <User Schedule>  dextrose 5% + sodium chloride 0.45%. 1000 milliLiter(s) IV Continuous <Continuous>  famotidine    Tablet 20 milliGRAM(s) Oral daily  fluconAZOLE   Tablet 200 milliGRAM(s) Oral daily  gabapentin 100 milliGRAM(s) Oral two times a day  heparin  Injectable 5000 Unit(s) SubCutaneous every 12 hours  latanoprost 0.005% Ophthalmic Solution 1 Drop(s) Both EYES daily  meropenem  IVPB 500 milliGRAM(s) IV Intermittent every 24 hours  polyethylene glycol 3350 17 Gram(s) Oral daily  predniSONE   Tablet 5 milliGRAM(s) Oral daily  senna 2 Tablet(s) Oral at bedtime  trimethoprim   80 mG/sulfamethoxazole 400 mG 1 Tablet(s) Oral daily  valGANciclovir 450 milliGRAM(s) Oral <User Schedule>    REVIEW OF SYSTEMS  --------------------------------------------------------------------------------  Gen: No fatigue, fevers/chills, weakness  Skin: No rashes  Head/Eyes/Ears/Mouth: No headache; No sore throat  Respiratory: No dyspnea, cough,   CV: No chest pain, PND, orthopnea  GI: No abdominal pain, diarrhea, constipation, nausea, vomiting  Transplant: + pain by incision   : No increased frequency, dysuria, hematuria, nocturia    All other systems were reviewed and are negative, except as noted.    VITALS/PHYSICAL EXAM  --------------------------------------------------------------------------------  T(C): 37.4 (01-06-20 @ 06:00), Max: 37.6 (01-06-20 @ 03:00)  HR: 87 (01-06-20 @ 06:00) (58 - 87)  BP: 138/73 (01-06-20 @ 06:00) (90/40 - 161/185)  RR: 18 (01-06-20 @ 06:00) (13 - 22)  SpO2: 100% (01-06-20 @ 06:00) (93% - 100%)  Wt(kg): --    01-05-20 @ 07:01  -  01-06-20 @ 07:00  --------------------------------------------------------  IN: 1235 mL / OUT: 230 mL / NET: 1005 mL    Physical Exam:  	Gen: NAD  	HEENT: Supple neck  	Pulm: CTA B/L  	CV: RRR, S1S2; no rub  	Abd: +BS, soft, nontender/nondistended                      Transplant: incision c/d/i  	: No suprapubic tenderness  	LE: +1-2 b/l pitting edema  	Neuro: No focal deficits  	Psych: Normal affect and mood  	Skin: Warm, without rashes    LABS/STUDIES  --------------------------------------------------------------------------------              8.4    25.60 >-----------<  585      [01-06-20 @ 04:55]              25.7     131  |  91  |  38  ----------------------------<  98      [01-06-20 @ 04:55]  4.3   |  26  |  6.37        Ca     8.9     [01-06-20 @ 04:55]      Mg     2.0     [01-06-20 @ 04:55]      Phos  4.8     [01-06-20 @ 04:55]    Creatinine Trend:  SCr 6.37 [01-06 @ 04:55]  SCr 4.38 [01-05 @ 06:32]  SCr 6.86 [01-04 @ 21:16]  SCr 6.22 [01-04 @ 06:58]  SCr 4.80 [01-03 @ 06:11] [facility] DIVISION OF KIDNEY DISEASES AND HYPERTENSION -- FOLLOW UP NOTE  --------------------------------------------------------------------------------  HPI: 48 yo M with hx of ESRD on HD, s/p DDRT on 12/8, elevated SCr on HD using AVF, complicated by TMA related to CNI, now on belatacept, admitted with influenza. Pt s/p washout of infected collection and biopsy (1/1/20) which revealed ATN. Last HD treatment completed on 1/4/20. Pt. transferred to SICU for hemodynamic monitoring in the setting of fever, tachyarrhythmia, and CT imaging showing diffusely enlarged perinephric collection. Pt. with UOP in past 24 hours of ~230 cc.    Pt. seen and examined at bedside this AM in SICU. Pt. states that he has some pain but is comfortable. Denies CP, SOB, N/V/F/C.    PAST HISTORY  --------------------------------------------------------------------------------  No significant changes to PMH, PSH, FHx, SHx, unless otherwise noted    ALLERGIES & MEDICATIONS  --------------------------------------------------------------------------------  Allergies    coconut (Anaphylaxis)  morphine (Pruritus; Rash)    Intolerances      Standing Inpatient Medications  allopurinol 100 milliGRAM(s) Oral daily  brimonidine 0.2% Ophthalmic Solution 1 Drop(s) Both EYES three times a day  chlorhexidine 2% Cloths 1 Application(s) Topical <User Schedule>  dextrose 5% + sodium chloride 0.45%. 1000 milliLiter(s) IV Continuous <Continuous>  famotidine    Tablet 20 milliGRAM(s) Oral daily  fluconAZOLE   Tablet 200 milliGRAM(s) Oral daily  gabapentin 100 milliGRAM(s) Oral two times a day  heparin  Injectable 5000 Unit(s) SubCutaneous every 12 hours  latanoprost 0.005% Ophthalmic Solution 1 Drop(s) Both EYES daily  meropenem  IVPB 500 milliGRAM(s) IV Intermittent every 24 hours  polyethylene glycol 3350 17 Gram(s) Oral daily  predniSONE   Tablet 5 milliGRAM(s) Oral daily  senna 2 Tablet(s) Oral at bedtime  trimethoprim   80 mG/sulfamethoxazole 400 mG 1 Tablet(s) Oral daily  valGANciclovir 450 milliGRAM(s) Oral <User Schedule>    REVIEW OF SYSTEMS  --------------------------------------------------------------------------------  Gen: No fatigue, fevers/chills, weakness  Skin: No rashes  Head/Eyes/Ears/Mouth: No headache; No sore throat  Respiratory: No dyspnea, cough,   CV: No chest pain, PND, orthopnea  GI: No abdominal pain, diarrhea, constipation, nausea, vomiting  Transplant: + pain by incision   : No increased frequency, dysuria, hematuria, nocturia    All other systems were reviewed and are negative, except as noted.    VITALS/PHYSICAL EXAM  --------------------------------------------------------------------------------  T(C): 37.4 (01-06-20 @ 06:00), Max: 37.6 (01-06-20 @ 03:00)  HR: 87 (01-06-20 @ 06:00) (58 - 87)  BP: 138/73 (01-06-20 @ 06:00) (90/40 - 161/185)  RR: 18 (01-06-20 @ 06:00) (13 - 22)  SpO2: 100% (01-06-20 @ 06:00) (93% - 100%)  Wt(kg): --    01-05-20 @ 07:01  -  01-06-20 @ 07:00  --------------------------------------------------------  IN: 1235 mL / OUT: 230 mL / NET: 1005 mL    Physical Exam:  	Gen: NAD  	HEENT: Supple neck  	Pulm: CTA B/L  	CV: RRR, S1S2; no rub  	Abd: +BS, soft, nontender/nondistended                      Transplant: incision c/d/i  	: No suprapubic tenderness  	LE: +1-2 b/l pitting edema  	Neuro: No focal deficits  	Psych: Normal affect and mood  	Skin: Warm, without rashes    LABS/STUDIES  --------------------------------------------------------------------------------              8.4    25.60 >-----------<  585      [01-06-20 @ 04:55]              25.7     131  |  91  |  38  ----------------------------<  98      [01-06-20 @ 04:55]  4.3   |  26  |  6.37        Ca     8.9     [01-06-20 @ 04:55]      Mg     2.0     [01-06-20 @ 04:55]      Phos  4.8     [01-06-20 @ 04:55]    Creatinine Trend:  SCr 6.37 [01-06 @ 04:55]  SCr 4.38 [01-05 @ 06:32]  SCr 6.86 [01-04 @ 21:16]  SCr 6.22 [01-04 @ 06:58]  SCr 4.80 [01-03 @ 06:11]

## 2020-01-06 NOTE — PROGRESS NOTE ADULT - ASSESSMENT
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 In the ED febrile to 101.7, tachycardic > 90 and normotensive. WBC on presentation of 14.87 with 81.9% PMN.     RVP with Influenza A.   CXR Clear  At this point appears patient has Influenza infection without superimposed pneumonia.  s/p Tamiflu treatment course    UCx with > 100K Enterobacter  s/p Ex-Lap and washout of infected hematoma on 1/1  Growth of Enterobacter from surgical cultures  2 of the isolates intermediate/resistant to Ertapenem but rest susceptible  Unclear significance of this - would continue meropenem at this time    Suspect persistent fevers is secondary to residual infected perinephric fluid collection  Agree with plans for either bedside drainage or IR intervention.   Would send new cultures from drainage    Overall, Infected Hematoma, Positive Urine Culture, Influenza Infection, Leukocytosis, Fever, Renal Transplant Recipient    --Continue Meropenem 500 mg IV Q24H  --Agree with plan for bedside drainage vs. IR drainage of perinephric fluid collection  --Recommend repeat bacterial cultures from drainage  --Continue to follow CBC with diff  --Continue to follow temperature curve  --Follow up on surgical swab culture from 1/5  --Follow up on prelim blood cultures    I will continue to follow. Please feel free to contact me with any further questions.    Dada Jacobo M.D.  [facility] Division of Infectious Disease  8AM-5PM: Pager Number [phone]  After Hours (or if no response): Please contact the Infectious Diseases Office at [phone]

## 2020-01-06 NOTE — PROGRESS NOTE ADULT - ATTENDING COMMENTS
Evaluated in round  Hemodynamically stable, no further episode of arrhythmia, will add small dose BB if recurrence  Abx Meropenem, for possible drainage of infrarenal collection by IR  Change IVF to D5NS sec to hyponatremia, schedule HD  Antirejection meds on hold for sepsis

## 2020-01-06 NOTE — PROGRESS NOTE ADULT - PROBLEM SELECTOR PLAN 1
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 tacrolimus) requiring HD, PLEX 12/12 and plasmapheresis 12/14/19. Switched tacrolimus to belatacept.    - Biopsy in OR (1/1/20) revealed ATN, no evidence of rejection.  - Had infected collection washed out, however developed fever and tachyarrhythmia, abdominal pain. Found to have diffusely enlarging perinephric fluid collection on CT imaging. Now in SICU for hemodynamic monitoring.

## 2020-01-06 NOTE — CONSULT NOTE ADULT - ASSESSMENT
Mr. Prado is a 49 year old male with ESRD on HD, now s/p ddrt on 12/8/2019 course complicated by TMA/profound ATN, who presents from a HD center with a fever and cough. He was initially admitted with influenza. He is s/p washout of infected collection and biopsy which revealed ATN.   He is now found to have a diffusely enlarging perinephric fluid collection and is awaiting drainage.    While on telemetry, he had an episode of a wide complex tachycardia. It initially appears irregular, suggesting an atrial arrhythmia with aberrancy, though the remainder appears more like an episode of non-sustained VT.  Regardless, has been on telemetry since, without any further events.    - continue to watch on telemetry  - if tolerated by his BP, would restart his lopressor at 12.5 mg po bid  - watch creatinine and electrolytes. Keep K>4, Mg>2  - check echocardiogram. Last echocardiogram in 1/2019 with normal LV function   - the arrythmia likely occurred in the setting of his fever and infection.    - no sign of acute ischemia. He had a pharm stress test in 2018 that showed no evidence of ischemia  - no sign of volume overload on exam  - no cardiac contraindication to proceeding with ir drainage.  - will follow with you

## 2020-01-06 NOTE — PHYSICAL THERAPY INITIAL EVALUATION ADULT - MODALITIES TREATMENT COMMENTS
patient seen for VAC dressing change to Rt lower quadrant surgical wound , david session well, wound received C/D/I, measured: 20.0cmx1.5cmx3.0cm, no erythema, no purulent, no malodor, cleansed with NS, cavilon to periwound, adaptic touch to wound bed to protect stitches, filled with black foam, tracked to Rt lower abd with good seal reached at 125mmHg continuous

## 2020-01-06 NOTE — PROGRESS NOTE ADULT - ASSESSMENT
DDRT on 12/8, elevated SCr on HD using AVF, complicated by TMA related to CNI, now on belatacept, admitted with influenza. Pt s/p washout of infected collection and biopsy which revealed ATN. Pt. now re-admitted to SICU for expanding perinephric fluid collection and hemodynamic monitoring.

## 2020-01-06 NOTE — PROGRESS NOTE ADULT - ASSESSMENT
ASSESSMENT:  KAYLA PA is a 49y Male with history of ESRD on HD  (via LUE AVF), HTN, Gout, Glaucoma, PNET s/p robotic distal panc/splenectomy (at [place] 2015 by Dr. Young, followed by Dr. Raphael, [facility] Oncologist), RA with hand contractures who underwent DDRT on 12/8/19 (ureteral stent sutured to [place] - removed 12/15). Post op course c/b DGF requiring HD, thrombocytopenia, elevated LDH and Haptoglobulin. Schistocytes  on peripheral smear concerning for TMA. Envarsus held. Underwent renal bx on 12/13 c/w profound ATN. 1/1/2020 was taken to OR and found to have a perinephric hematoma. Underwent washout, evacuation of hematoma, and biopsy of transplanted kidney. He was recovering on the floor when overnight he spiked a fever to 38.1 and had 18 beats of wide complex tachycardia. He was also found to have worsening leukocytosis and became acutely hypotensive. A CT scan was obtained deomnstrating a 4.5x3.7 perinephric collection. IR was consulted for drainage and SICU was consulted for hemodynamic monitoring.    PLAN:    NEURO:  - Tylenol 650 mg q6 prn (mild)  - Tramadol 25/50 mg q6 prn (mod/severe)  - Gabapentin 100 mg BID    RESPIRATORY:   - No active issues  - OOB as tolerated, PT    CARDIOVASCULAR:  - Monitor hemodynamics  - f/u cards recs for wide complex tachycardia  - Hold home hydralazine, metoprolol    GI/NUTRITION:  - NPO for percutaneous drainage by IR  - Regular diet after  - Famotidine 20 mg daily  - Senna 2 tabs qhs  - Transplant meds: prednisone 5 mg daily, allopurinol 100 mg daily  - MMF per transplant team    GENITOURINARY/RENAL:  - D5 1/2NS @30cc/hr  - Monitor electrolytes  - Continue w/ HD for now  - f/u renal biopsy pathology    HEMATOLOGIC:  - SQH for VTE ppx  - Trend H/H  - LE duplex neg for DVT    INFECTIOUS DISEASE:  - Meropenem 500 mg daily  - PPX: fluconazole 200 mg daily, valganciclovir 450 mg three times weekly, bactrim 1 tab daily  - Appreciate ID input    ENDOCRINE:  - Monitor glucose on BMP    OPHTHALMOLOGY:  - Brimonidine/latanoprost drops    LIN Wall, PGY-2  SICU [id number]

## 2020-01-06 NOTE — PHYSICAL THERAPY INITIAL EVALUATION ADULT - DIAGNOSIS, PT EVAL
Decreased functional mobility/capacity secondary to impairments listed below Decreased functional mobility/capacity secondary to impairments listed below / integumentary impairment

## 2020-01-06 NOTE — CONSULT NOTE ADULT - SUBJECTIVE AND OBJECTIVE BOX
[facility] - Melissa Kelsey, Maximino Nunez, Hamzah Funes Savella  Office Number: [phone]    Initial Consult Note    CHIEF COMPLAINT: Patient is a 49y old  Male who presents with a chief complaint of Fever (06 Jan 2020 07:07)      HPI:  49M PMHx of ESRD on HD  ( via LUE AVF), HTN, Gout, Glaucoma, NET of pancreas (s/p robotic distal panc/splenectomy at [place] 2015 by Dr. Young, followed by Dr. Raphael, [facility] Oncologist), RA with hand contractures. 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 Post op course c/b DGF requiring HD, thrombocytopenia, elevated LDH and Haptoglobulin. Schistocytes  on peripheral smear concerning for TMA. Envarsus held. Received Belatacept 12/13. Started on PLEX (12/12, 12/15). Underwent renal bx on 12/13 c/w profound ATN.  Found to have much improvement to levels, likely related to Tacrolimus. He was discharged home on 12/20/19 w/ outpatient HD.     He was now sent to ED by dialysis center after he was found to be febrile to 102, he did not get HD this AM, and last full HD session was on 12/26. Upon arrival, he was febrile to 101.7, other vitals were stable. He states that he had a cough that began 5-6 days ago that has now mostly resolved. He reports no other febrile episodes other than this morning. He admits to having one SOB episode earlier this week that resolved after he got HD. He also states that his mother was hospitalized earlier this week and was flu+. Currently makes less than 1 cup urine/daily. He currently denies SOB, CP, dyspnea, HA, dizziness, N/V, diarrhea, constipation.     Called to evaluate for an episode of wide complex tachycardia on telemetry, 18 beats.  He is feeling better this morning  He denies chest pain, difficulty breathing or palpitations.        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    FAMILY HISTORY:  Family history of myocardial infarction in first degree male relative  Family history of rheumatoid arthritis  Family history of hypertension in mother  Family history of cerebral aneurysm (Sibling)    No family history of acute MI or sudden cardiac death.    MEDICATIONS  (STANDING):  allopurinol 100 milliGRAM(s) Oral daily  brimonidine 0.2% Ophthalmic Solution 1 Drop(s) Both EYES three times a day  chlorhexidine 2% Cloths 1 Application(s) Topical <User Schedule>  dextrose 5% + sodium chloride 0.9%. 1000 milliLiter(s) (30 mL/Hr) IV Continuous <Continuous>  famotidine    Tablet 20 milliGRAM(s) Oral daily  fluconAZOLE   Tablet 400 milliGRAM(s) Oral daily  gabapentin 100 milliGRAM(s) Oral two times a day  heparin  Injectable 5000 Unit(s) SubCutaneous every 12 hours  latanoprost 0.005% Ophthalmic Solution 1 Drop(s) Both EYES daily  meropenem  IVPB 500 milliGRAM(s) IV Intermittent every 24 hours  polyethylene glycol 3350 17 Gram(s) Oral daily  predniSONE   Tablet 5 milliGRAM(s) Oral daily  senna 2 Tablet(s) Oral at bedtime  trimethoprim   80 mG/sulfamethoxazole 400 mG 1 Tablet(s) Oral daily  valGANciclovir 450 milliGRAM(s) Oral <User Schedule>    MEDICATIONS  (PRN):  acetaminophen   Tablet .. 650 milliGRAM(s) Oral every 6 hours PRN Mild Pain (1 - 3)  traMADol 50 milliGRAM(s) Oral every 6 hours PRN Severe Pain (7 - 10)  traMADol 25 milliGRAM(s) Oral every 6 hours PRN Moderate Pain (4 - 6)      Allergies    coconut (Anaphylaxis)  morphine (Pruritus; Rash)    Intolerances        REVIEW OF SYSTEMS:    CONSTITUTIONAL: + weakness, + fevers, no chills  EYES/ENT: No visual changes;  No vertigo or throat pain   NECK: No pain or stiffness  RESPIRATORY: +cough, no wheezing, hemoptysis; No shortness of breath  CARDIOVASCULAR: No chest pain or palpitations  GASTROINTESTINAL: No abdominal pain. No nausea, vomiting, or hematemesis; No diarrhea or constipation. No melena or hematochezia.  GENITOURINARY: No dysuria, frequency or hematuria  NEUROLOGICAL: No numbness or weakness  SKIN: No itching or rash  All other review of systems is negative unless indicated above    VITAL SIGNS:   Vital Signs Last 24 Hrs  T(C): 38.2 (06 Jan 2020 07:00), Max: 38.2 (06 Jan 2020 07:00)  T(F): 100.7 (06 Jan 2020 07:00), Max: 100.7 (06 Jan 2020 07:00)  HR: 81 (06 Jan 2020 08:00) (58 - 87)  BP: 132/67 (06 Jan 2020 08:00) (99/60 - 161/185)  BP(mean): 94 (06 Jan 2020 08:00) (75 - 116)  RR: 15 (06 Jan 2020 08:00) (13 - 22)  SpO2: 94% (06 Jan 2020 08:00) (93% - 100%)    I&O's Summary    05 Jan 2020 07:01  -  06 Jan 2020 07:00  --------------------------------------------------------  IN: 1235 mL / OUT: 230 mL / NET: 1005 mL    06 Jan 2020 07:01  -  06 Jan 2020 09:30  --------------------------------------------------------  IN: 30 mL / OUT: 0 mL / NET: 30 mL        On Exam:    Constitutional: NAD, alert and oriented x 3  Lungs:  Non-labored, breath sounds are clear bilaterally, No wheezing, rales or rhonchi  Cardiovascular: RRR.  S1 and S2 positive.  No murmurs, rubs, gallops or clicks  Gastrointestinal: Bowel Sounds present, soft, nontender.   Lymph: No peripheral edema. No cervical lymphadenopathy.  Neurological: Alert, no focal deficits  Skin: No rashes or ulcers   Psych:  Mood & affect appropriate.    LABS: All Labs Reviewed:                        8.4    25.60 )-----------( 585      ( 06 Jan 2020 04:55 )             25.7                         8.6    26.55 )-----------( 621      ( 05 Jan 2020 06:32 )             25.6                         9.0    17.89 )-----------( 635      ( 04 Jan 2020 06:58 )             28.5     06 Jan 2020 04:55    131    |  91     |  38     ----------------------------<  98     4.3     |  26     |  6.37   05 Jan 2020 06:32    135    |  92     |  25     ----------------------------<  64     4.0     |  26     |  4.38   04 Jan 2020 21:16    128    |  89     |  50     ----------------------------<  82     5.3     |  20     |  6.86     Ca    8.9        06 Jan 2020 04:55  Ca    9.0        05 Jan 2020 06:32  Ca    8.4        04 Jan 2020 21:16  Phos  4.8       06 Jan 2020 04:55  Phos  3.2       05 Jan 2020 06:32  Phos  4.9       04 Jan 2020 21:16  Mg     2.0       06 Jan 2020 04:55  Mg     2.0       05 Jan 2020 06:32  Mg     2.1       04 Jan 2020 21:16    TPro  6.5    /  Alb  3.0    /  TBili  0.6    /  DBili  0.3    /  AST  12     /  ALT  <5     /  AlkPhos  216    06 Jan 2020 04:55  TPro  6.8    /  Alb  2.9    /  TBili  1.0    /  DBili  0.4    /  AST  13     /  ALT  <5     /  AlkPhos  238    05 Jan 2020 06:32  TPro  7.0    /  Alb  2.9    /  TBili  0.6    /  DBili  0.3    /  AST  14     /  ALT  <5     /  AlkPhos  236    04 Jan 2020 06:58    PT/INR - ( 06 Jan 2020 04:55 )   PT: 14.7 sec;   INR: 1.28 ratio         PTT - ( 06 Jan 2020 04:55 )  PTT:32.5 sec      Blood Culture: Organism --  Gram Stain Blood -- Gram Stain --  Specimen Source .Blood Blood  Culture-Blood --    Organism Enterobacter cloacae complex  Gram Stain Blood -- Gram Stain   Rare polymorphonuclear leukocytes seen per low power field  Rare Gram Negative Rods seen per oil power field  Specimen Source .Tissue Other  Culture-Blood --    Organism Enterobacter cloacae complex  Gram Stain Blood -- Gram Stain --  Specimen Source .Surgical Swab collection around right kidney  Culture-Blood --    Organism Escherichia coli  Gram Stain Blood -- Gram Stain   Upon re-evaluation of gram stain: Rare Gram Negative Rods seen  No polymorphonuclear cells seen  by cytocentrifuge  Specimen Source .Body Fluid periphrenic collection  Culture-Blood --    Organism Enterobacter cloacae complex  Gram Stain Blood -- Gram Stain --  Specimen Source .Surgical Swab right kidney abscess  Culture-Blood --            RADIOLOGY:    EKG: sr

## 2020-01-06 NOTE — CONSULT NOTE ADULT - ASSESSMENT
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 Post op course c/b DGF s/p renal bx on 12/13 c/b perirenal hematoma/abscess s/p washout 1/1. Today patient is s/p IR aspiration of persistent collection seen inferior to pelvic kidney, now with new-onset gross hematuria    6 eye catheter placed and clot irrigated  Urine clear pink after irrigating. 20Fr 3 way placed and third port capped    - Hematuria appears to be resolving based on color seen in urinal compared to color obtained from martinez  - No need for CBI at this time  - Will keep martinez catheter in place  - Monitor urine color; irrigate prn  - Followup urine culture  - Monitor H&H and vital signs

## 2020-01-06 NOTE — PROGRESS NOTE ADULT - SUBJECTIVE AND OBJECTIVE BOX
Follow Up:  Fever, Infected Hematoma    Interval History: Remains febrile, continued pain around transplant site. no diarrhea     REVIEW OF SYSTEMS  [  ] ROS unobtainable because:    [ x ] All other systems negative except as noted below    Constitutional:  [x ] fever [ ] chills  [ ] weight loss  [ ] weakness  Skin:  [ ] rash [ ] phlebitis	  Eyes: [ ] icterus [ ] pain  [ ] discharge	  ENMT: [ ] sore throat  [ ] thrush [ ] ulcers [ ] exudates  Respiratory: [ ] dyspnea [ ] hemoptysis [ ] cough [ ] sputum	  Cardiovascular:  [ ] chest pain [ ] palpitations [ ] edema	  Gastrointestinal:  [ ] nausea [ ] vomiting [ ] diarrhea [ ] constipation [x ] pain	  Genitourinary:  [ ] dysuria [ ] frequency [ ] hematuria [ ] discharge [ ] flank pain  [ ] incontinence  Musculoskeletal:  [ ] myalgias [ ] arthralgias [ ] arthritis  [ ] back pain  Neurological:  [ ] headache [ ] seizures  [ ] confusion/altered mental status    Allergies  coconut (Anaphylaxis)  morphine (Pruritus; Rash)        ANTIMICROBIALS:  fluconAZOLE   Tablet 400 daily  meropenem  IVPB 500 every 24 hours  trimethoprim   80 mG/sulfamethoxazole 400 mG 1 daily  valGANciclovir 450 <User Schedule>      OTHER MEDS:  MEDICATIONS  (STANDING):  acetaminophen   Tablet .. 650 every 6 hours PRN  allopurinol 100 daily  famotidine    Tablet 20 daily  gabapentin 100 two times a day  heparin  Injectable 5000 every 12 hours  polyethylene glycol 3350 17 daily  predniSONE   Tablet 5 daily  senna 2 at bedtime  traMADol 50 every 6 hours PRN  traMADol 25 every 6 hours PRN      Vital Signs Last 24 Hrs  T(C): 36.5 (06 Jan 2020 19:00), Max: 38.2 (06 Jan 2020 07:00)  T(F): 97.7 (06 Jan 2020 19:00), Max: 100.7 (06 Jan 2020 07:00)  HR: 73 (06 Jan 2020 19:30) (60 - 87)  BP: 126/60 (06 Jan 2020 19:30) (106/58 - 161/185)  BP(mean): 87 (06 Jan 2020 19:30) (76 - 116)  RR: 25 (06 Jan 2020 19:30) (12 - 39)  SpO2: 99% (06 Jan 2020 19:30) (83% - 100%)    PHYSICAL EXAMINATION:  General: Alert and Awake, NAD  HEENT: PERRL, EOMI  Neck: Supple  Cardiac: RRR, No M/R/G  Resp: CTAB, No Wh/Rh/Ra  Abdomen: Surgical site in RLQ mucoid drainage. RLQ drain with mixed bloody/purulent drainage. mild-mod tenderness in RLQ. No rigidity.   MSK: No LE edema. No Calf tenderness  : No martinez  Skin: No rashes or lesions. Skin is warm and dry to the touch.   Neuro: Alert and Awake. CN 2-12 Grossly intact. Moves all four extremities spontaneously.  Psych: Calm, Pleasant, Cooperative                          8.4    25.60 )-----------( 585      ( 06 Jan 2020 04:55 )             25.7       01-06    131<L>  |  91<L>  |  38<H>  ----------------------------<  98  4.3   |  26  |  6.37<H>    Ca    8.9      06 Jan 2020 04:55  Phos  4.8     01-06  Mg     2.0     01-06    TPro  6.5  /  Alb  3.0<L>  /  TBili  0.6  /  DBili  0.3<H>  /  AST  12  /  ALT  <5<L>  /  AlkPhos  216<H>  01-06          MICROBIOLOGY:  Vancomycin Level, Random: 16.3 ug/mL (01-06-20 @ 04:55)  v  .Urine Clean Catch (Midstream)  01-05-20   >100,000 CFU/ml Gram Negative Rods  --  --      .Blood Blood  01-04-20   No growth to date.  --  --      .Tissue Other  01-01-20   Numerous Enterobacter cloacae complex  --  Enterobacter cloacae complex      .Surgical Swab collection around right kidney  01-01-20   Moderate Enterobacter cloacae complex  --  Enterobacter cloacae complex      .Body Fluid periphrenic collection  01-01-20   Moderate Escherichia coli  Moderate Enterobacter cloacae (Carbapenem Resistant)  --  Escherichia coli  Enterobacter cloacae (Carbapenem Resistant)      .Surgical Swab right kidney abscess  01-01-20   Moderate Enterobacter cloacae complex  --  Enterobacter cloacae complex      .Urine Clean Catch (Midstream)  12-29-19   >100,000 CFU/ml Enterobacter cloacae  --  Enterobacter cloacae      .Blood Blood-Peripheral  12-27-19   No growth at 5 days.  --  --      .Stool Feces  12-14-19   No enteric pathogens isolated.  (Stool culture examined for Salmonella,  Shigella, Campylobacter, Aeromonas, Plesiomonas,  Vibrio, E.coli O157 and Yersinia)  --  --      .Stool moriah johnie  12-14-19   GI PCR Results: NOT detected  *******Please Note:*******  GI panel PCR evaluates for:  Campylobacter, Plesiomonas shigelloides, Salmonella,  Vibrio, Yersinia enterocolitica, Enteroaggregative  Escherichia coli (EAEC), Enteropathogenic E.coli (EPEC),  Enterotoxigenic E. coli (ETEC) lt/st, Shiga-like  toxin-producing E. coli (STEC) stx1/stx2,  Shigella/ Enteroinvasive E. coli (EIEC), Cryptosporidium,  Cyclospora cayetanensis, Entamoeba histolytica,  Giardia lamblia, Adenovirus F 40/41, Astrovirus,  Norovirus GI/GII, Rotavirus A, Sapovirus  --  --      .Nose Nose  12-10-19   Few Methicillin Sensitive Staph aureus "This can represent normal nasal  carriage.  PCR is more sensitive for identifying MRSA/MSSA carriage"  --  --    RADIOLOGY:    EXAM:  DUPLEX SCAN EXT VEINS LOWER BI                        PROCEDURE DATE:  01/04/2020    No evidence of deep venous thrombosis in either lower extremity.  Bilateral subcutaneous edema.    EXAM:  CT ABDOMEN AND PELVIS OC                        PROCEDURE DATE:  01/04/2020    Diffusely enlarged right lower quadrant transplant kidney with perinephric fluid collection inferior to the transplant kidney.  Postsurgical changes of the right anterior pelvis with a small amount of subcutaneous hematoma underlying the surgical skin staples.

## 2020-01-06 NOTE — PROGRESS NOTE ADULT - ASSESSMENT
49M PMHx of ESRD on HD  ( via LUE AVF), HTN, Gout, Glaucoma, NET of pancreas (s/p robotic distal panc/splenectomy at [place] 2015 by Dr. Young, followed by Dr. Raphael, [facility] Oncologist), RA with hand contractures. 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 Post op course c/b DGF requiring HD, thrombocytopenia, elevated LDH and Haptoglobulin. Schistocytes  on peripheral smear concerning for TMA. Envarsus held. Received Belatacept 12/13. Started on PLEX (12/12, 12/15). Underwent renal bx on 12/13 c/w profound ATN. Re-admitted with Influenza,  s/p hematoma evacuation/ abd washout and renal biopsy now with leukocytosis and fevers.       Influenza  - RVP + influenza A  - Completed Tamiflu      s/p DDRT c/b DGF/ATN   - US on admission with increased perinephric fluid collection  - POD 5 s/p ex lap/washout and renal txp bx (prelim c/w ATN, no rejection)   - Immuno: Belatacept (last d ose 12/28, Next dose 1/6 will hold due to infection), Pred 5 (decreased from 10mg)  - MMF on hold 2/2 uptrending WBC  - b/l LE dopplers neg dvt  - PPx: bactrim/valcyte  - On fluconazole 200 mg QD hold nystatin   - on  SQ heparin 5000 units BID   - Strict I/Os  - Renal diet  - Bowel regimen  - SCDs , IS, encourage ambulation   - HD held today per renal    ID: UTI/Infected Hematoma, cxs grew Enterobacter cloacae       -ID consulted appreciate recs  - IR consulted for drainage of abscess    - Now with purulent drainage at incision site plan to open at bedside and evaluate if needs IR drainage  - Fever this am -cont meropenem, s/p one dose vancomycin yesterday  - F/U with ID  - body fluid cult now pos Ecoli sensitivities pending,  - initial blood cultures neg   - sent blood cult x 2 during HD Saturday   - CT scan w/ oral contrast Abd/ pelvis- residual collection  - Cardiology consult for episode of wide complex tachycardia    HTN,  BP improved  - Will monitor BP when stable will resume Metoprolol 12.5 BID   - hold  hydralazine

## 2020-01-06 NOTE — PROGRESS NOTE ADULT - PROBLEM SELECTOR PLAN 2
s/p belatacept 12/28/19  - next dose belatacept for today.  - discontinue MMF for concern of infected perinephric collection.  - continue prednisone to 5 mg daily   - continue ppx: Nystatin, bactrim, Valcyte renally dosed TIW, Pepcid s/p belatacept 12/28/19  - next dose belatacept for today, however recommend holding d/t likely infected perinephric collection.  - discontinue MMF for concern of infected perinephric collection.  - continue prednisone to 5 mg daily   - continue ppx: Nystatin, bactrim, Valcyte renally dosed TIW, Pepcid

## 2020-01-06 NOTE — PROGRESS NOTE ADULT - ATTENDING COMMENTS
s/p DDRT 12/2019 with DGF, re-admitted with influenza and abscess around kidney  s/p wound exploration and washout of abscess  +sepsis overnight, hypotensive, febrile and WBC 25k  currently hemodynamically stable  cont abx with meropenem, and diflucan. f/u recent cultures and ID recommendations  wound explored at bedside, showing necrotic tissue.   IR for drainage of medial collection  Immunosuppression- Belatecept held, cellcept held due to infection. Cont prednisone 5mg daily

## 2020-01-06 NOTE — PROGRESS NOTE ADULT - SUBJECTIVE AND OBJECTIVE BOX
Transplant Surgery - Multidisciplinary Rounds  --------------------------------------------------------------  R DDRT    Date:  12/8/19 Dr. Espinal, readmitted 12/27/19 with +Flu/perinephretic collection  Ex Lap washout/ renal bx  Date:  1/1/20    Present:  Patient seen with multidisciplinary team including Transplant Surgeon: Dr. Poe. Dr. Velásquez, Dr. Joy, Dr. Cruz. Transplant Nephrologist: Beverley and renal fellow. Transplant pharmacist Job Tamez. NP: Stefan during am rounds and examined with Dr. Joy.  Disciplines not in attendance will be notified of the plan.     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 Young, followed by Dr. Raphael, [facility] Oncologist), RA with hand contractures. 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 Post op course c/b DGF requiring HD, thrombocytopenia, elevated LDH and Haptoglobulin. Schistocytes  on peripheral smear concerning for TMA. Envarsus held. Received Belatacept 12/13. Started on PLEX (12/12, 12/15). Underwent renal bx on 12/13 c/w profound ATN.  Found to have much improvement to levels, likely related to Tacrolimus. He was discharged home on 12/20/19 w/ outpatient HD.     Re-admitted 12/27 with influenza, completed treatement with Tamiflu on 12/31. Urine cx on admission grew Ent cloacae, was started on meropenem 12/31.   Renal US on admission with increasing hematoma. OR on 1/1 for ex-lap, hematoma evacuation, washout and renal bx. OR cxs growing Ent. clocae.  Prelim bx results c/w ATN, no rejection.     cxs:   1/1 OR cx - Ent Clocae  1/1 OR fluid - GNR Ecoli   12/29 Urine - Ent Clocae  12/27 Bld - Neg    Interval events:  POD 5  s/p ex-lap/washout Renal bx  - Was upgraded to SICU yesterday for elevated WBC, abd pain, temp, and hypotension   - Tmax over 24 hours 38.2 repeat blood cx pending results  - WBC 25 down from 26.55 up from 17.89 - MMF held  - Ct scan abd pelvis demonstrates residual collection   - HD Saturday 2 L removed     Potential Discharge date: pending clinical improvement       MEDICATIONS  (STANDING):  acetaminophen  IVPB .. 1000 milliGRAM(s) IV Intermittent once  allopurinol 100 milliGRAM(s) Oral daily  brimonidine 0.2% Ophthalmic Solution 1 Drop(s) Both EYES three times a day  chlorhexidine 2% Cloths 1 Application(s) Topical <User Schedule>  dextrose 5% + sodium chloride 0.9%. 1000 milliLiter(s) (30 mL/Hr) IV Continuous <Continuous>  famotidine    Tablet 20 milliGRAM(s) Oral daily  fluconAZOLE   Tablet 400 milliGRAM(s) Oral daily  gabapentin 100 milliGRAM(s) Oral two times a day  heparin  Injectable 5000 Unit(s) SubCutaneous every 12 hours  latanoprost 0.005% Ophthalmic Solution 1 Drop(s) Both EYES daily  meropenem  IVPB 500 milliGRAM(s) IV Intermittent every 24 hours  polyethylene glycol 3350 17 Gram(s) Oral daily  predniSONE   Tablet 5 milliGRAM(s) Oral daily  senna 2 Tablet(s) Oral at bedtime  trimethoprim   80 mG/sulfamethoxazole 400 mG 1 Tablet(s) Oral daily  valGANciclovir 450 milliGRAM(s) Oral <User Schedule>    MEDICATIONS  (PRN):  acetaminophen   Tablet .. 650 milliGRAM(s) Oral every 6 hours PRN Mild Pain (1 - 3)  traMADol 50 milliGRAM(s) Oral every 6 hours PRN Severe Pain (7 - 10)  traMADol 25 milliGRAM(s) Oral every 6 hours PRN Moderate Pain (4 - 6)      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  Carpal tunnel syndrome  Brain cyst: had MRI recently- now gone  Anemia  Gastric ulcer: Long time ago  Rheumatoid arthritis  Renal transplant recipient  Breakdown (mechanical) of penile (implanted) prosthesis, sequela  End-stage renal disease (ESRD): PERMACATH PLACEMENT  Abscess of left buttock: s/p I &amp; D  AV fistula: placement left lower arm  S/P cystoscopy: stent placement &amp; removal for kidney stones, lithiotripsy  s/p Lt Carpal tunnel: 2011      Vital Signs Last 24 Hrs  T(C): 37.1 (06 Jan 2020 11:00), Max: 38.2 (06 Jan 2020 07:00)  T(F): 98.8 (06 Jan 2020 11:00), Max: 100.7 (06 Jan 2020 07:00)  HR: 76 (06 Jan 2020 11:00) (58 - 87)  BP: 127/62 (06 Jan 2020 11:00) (99/60 - 161/185)  BP(mean): 88 (06 Jan 2020 11:00) (75 - 116)  RR: 12 (06 Jan 2020 11:00) (12 - 39)  SpO2: 94% (06 Jan 2020 11:00) (93% - 100%)    I&O's Summary    05 Jan 2020 07:01  -  06 Jan 2020 07:00  --------------------------------------------------------  IN: 1235 mL / OUT: 230 mL / NET: 1005 mL    06 Jan 2020 07:01  -  06 Jan 2020 11:36  --------------------------------------------------------  IN: 30 mL / OUT: 0 mL / NET: 30 mL                              8.4    25.60 )-----------( 585      ( 06 Jan 2020 04:55 )             25.7     01-06    131<L>  |  91<L>  |  38<H>  ----------------------------<  98  4.3   |  26  |  6.37<H>    Ca    8.9      06 Jan 2020 04:55  Phos  4.8     01-06  Mg     2.0     01-06    TPro  6.5  /  Alb  3.0<L>  /  TBili  0.6  /  DBili  0.3<H>  /  AST  12  /  ALT  <5<L>  /  AlkPhos  216<H>  01-06          Culture - Blood (collected 01-04-20 @ 13:07)  Source: .Blood Blood  Preliminary Report (01-05-20 @ 14:01):    No growth to date.    Culture - Blood (collected 01-04-20 @ 13:07)  Source: .Blood Blood  Preliminary Report (01-05-20 @ 14:01):    No growth to date.    Culture - Tissue with Gram Stain (collected 01-01-20 @ 17:12)  Source: .Tissue Other  Gram Stain (01-02-20 @ 00:25):    Rare polymorphonuclear leukocytes seen per low power field    Rare Gram Negative Rods seen per oil power field  Final Report (01-06-20 @ 07:47):    Numerous Enterobacter cloacae complex  Organism: Enterobacter cloacae complex (01-06-20 @ 07:47)  Organism: Enterobacter cloacae complex (01-06-20 @ 07:47)    Culture - Surgical Swab (collected 01-01-20 @ 17:06)  Source: .Surgical Swab collection around right kidney  Final Report (01-06-20 @ 07:45):    Moderate Enterobacter cloacae complex  Organism: Enterobacter cloacae complex (01-06-20 @ 07:45)  Organism: Enterobacter cloacae complex (01-03-20 @ 09:42)    Culture - Body Fluid with Gram Stain (collected 01-01-20 @ 16:59)  Source: .Body Fluid periphrenic collection  Gram Stain (01-02-20 @ 09:22):    Upon re-evaluation of gram stain: Rare Gram Negative Rods seen    No polymorphonuclear cells seen    by cytocentrifuge  Final Report (01-06-20 @ 08:44):    Moderate Escherichia coli    Moderate Enterobacter cloacae  Organism: Escherichia coli  Enterobacter cloacae (Carbapenem Resistant) (01-06-20 @ 08:44)  Organism: Enterobacter cloacae (Carbapenem Resistant) (01-06-20 @ 08:44)  Organism: Escherichia coli (01-06-20 @ 08:44)    Culture - Surgical Swab (collected 01-01-20 @ 16:51)  Source: .Surgical Swab right kidney abscess  Final Report (01-06-20 @ 07:45):    Moderate Enterobacter cloacae complex  Organism: Enterobacter cloacae complex (01-06-20 @ 07:45)  Organism: Enterobacter cloacae complex (01-06-20 @ 07:45)                REVIEW OF SYSTEMS  Gen: No weight changes, fatigue, fevers/chills, weakness  Skin: No rashes  Head/Eyes/Ears/Mouth: No headache; Normal hearing; Normal vision w/o blurriness; No sinus pain/discomfort, sore throat  Respiratory: No dyspnea, cough, wheezing, hemoptysis  CV: No chest pain, PND, orthopnea  GI: c/o incisional pain No diarrhea, constipation, nausea, vomiting, melena, hematochezia  : No increased frequency, dysuria, hematuria, nocturia  MSK: No joint pain/swelling; no back pain; no edema  Neuro: No dizziness/lightheadedness, weakness, seizures, numbness, tingling  Heme: No easy bruising or bleeding  Endo: No heat/cold intolerance  Psych: No significant nervousness, anxiety, stress, depression      PHYSICAL EXAM:    Constitutional: Well developed / well nourished  	Eyes: Anicteric, PERRLA  	ENMT: nc/at   	Neck: Supple  	Respiratory: CTA     	Cardiovascular: RRR  	Gastrointestinal: Soft abdomen, tender to palpation ND. incision w/ serosanguinous drainage, BO w/ minimal drainage    Genitourinary:  Voiding spontaneously/ dark pyuria noted     Extremities: SCD's in place and working bilaterally  	Vascular: faintly palpable pulses b/l LE; 3+ pitting edema. R > L   	Neurological: A&O x3  	Skin: moderate strike through on the dressing, BO serosanguinous   	Musculoskeletal: Moving all extremities    Psychiatric: Responsive

## 2020-01-06 NOTE — PHYSICAL THERAPY INITIAL EVALUATION ADULT - PLANNED THERAPY INTERVENTIONS, PT EVAL
strengthening/transfer training/balance training/gait training strengthening/balance training/transfer training/wound care management/gait training

## 2020-01-07 LAB
-  AMIKACIN: SIGNIFICANT CHANGE UP
-  AMPICILLIN/SULBACTAM: SIGNIFICANT CHANGE UP
-  AMPICILLIN: SIGNIFICANT CHANGE UP
-  AZTREONAM: SIGNIFICANT CHANGE UP
-  CEFAZOLIN: SIGNIFICANT CHANGE UP
-  CEFEPIME: SIGNIFICANT CHANGE UP
-  CEFOXITIN: SIGNIFICANT CHANGE UP
-  CEFTRIAXONE: SIGNIFICANT CHANGE UP
-  CIPROFLOXACIN: SIGNIFICANT CHANGE UP
-  ERTAPENEM: SIGNIFICANT CHANGE UP
-  GENTAMICIN: SIGNIFICANT CHANGE UP
-  IMIPENEM: SIGNIFICANT CHANGE UP
-  LEVOFLOXACIN: SIGNIFICANT CHANGE UP
-  MEROPENEM: SIGNIFICANT CHANGE UP
-  NITROFURANTOIN: SIGNIFICANT CHANGE UP
-  PIPERACILLIN/TAZOBACTAM: SIGNIFICANT CHANGE UP
-  TIGECYCLINE: SIGNIFICANT CHANGE UP
-  TOBRAMYCIN: SIGNIFICANT CHANGE UP
-  TRIMETHOPRIM/SULFAMETHOXAZOLE: SIGNIFICANT CHANGE UP
ALBUMIN SERPL ELPH-MCNC: 2.7 G/DL — LOW (ref 3.3–5)
ALP SERPL-CCNC: 194 U/L — HIGH (ref 40–120)
ALT FLD-CCNC: 5 U/L — LOW (ref 10–45)
ANION GAP SERPL CALC-SCNC: 19 MMOL/L — HIGH (ref 5–17)
ANISOCYTOSIS BLD QL: SIGNIFICANT CHANGE UP
APTT BLD: 31.2 SEC — SIGNIFICANT CHANGE UP (ref 27.5–36.3)
AST SERPL-CCNC: 8 U/L — LOW (ref 10–40)
BASE EXCESS BLDV CALC-SCNC: -1.3 MMOL/L — SIGNIFICANT CHANGE UP (ref -2–2)
BASOPHILS # BLD AUTO: 0.35 K/UL — HIGH (ref 0–0.2)
BASOPHILS NFR BLD AUTO: 0.9 % — SIGNIFICANT CHANGE UP (ref 0–2)
BILIRUB DIRECT SERPL-MCNC: 0.3 MG/DL — HIGH (ref 0–0.2)
BILIRUB INDIRECT FLD-MCNC: 0.3 MG/DL — SIGNIFICANT CHANGE UP (ref 0.2–1)
BILIRUB SERPL-MCNC: 0.6 MG/DL — SIGNIFICANT CHANGE UP (ref 0.2–1.2)
BUN SERPL-MCNC: 43 MG/DL — HIGH (ref 7–23)
BURR CELLS BLD QL SMEAR: PRESENT — SIGNIFICANT CHANGE UP
CA-I SERPL-SCNC: 1.05 MMOL/L — LOW (ref 1.12–1.3)
CALCIUM SERPL-MCNC: 8.3 MG/DL — LOW (ref 8.4–10.5)
CHLORIDE BLDV-SCNC: 100 MMOL/L — SIGNIFICANT CHANGE UP (ref 96–108)
CHLORIDE SERPL-SCNC: 93 MMOL/L — LOW (ref 96–108)
CO2 BLDV-SCNC: 25 MMOL/L — SIGNIFICANT CHANGE UP (ref 22–30)
CO2 SERPL-SCNC: 20 MMOL/L — LOW (ref 22–31)
CREAT SERPL-MCNC: 7.39 MG/DL — HIGH (ref 0.5–1.3)
CULTURE RESULTS: SIGNIFICANT CHANGE UP
EOSINOPHIL # BLD AUTO: 0 K/UL — SIGNIFICANT CHANGE UP (ref 0–0.5)
EOSINOPHIL NFR BLD AUTO: 0 % — SIGNIFICANT CHANGE UP (ref 0–6)
GAS PNL BLDV: 130 MMOL/L — LOW (ref 135–145)
GAS PNL BLDV: SIGNIFICANT CHANGE UP
GIANT PLATELETS BLD QL SMEAR: PRESENT — SIGNIFICANT CHANGE UP
GLUCOSE BLDV-MCNC: 104 MG/DL — HIGH (ref 70–99)
GLUCOSE SERPL-MCNC: 107 MG/DL — HIGH (ref 70–99)
HCO3 BLDV-SCNC: 23 MMOL/L — SIGNIFICANT CHANGE UP (ref 21–29)
HCT VFR BLD CALC: 21.8 % — LOW (ref 39–50)
HCT VFR BLD CALC: 22.1 % — LOW (ref 39–50)
HCT VFR BLD CALC: 25.1 % — LOW (ref 39–50)
HCT VFR BLD CALC: 25.4 % — LOW (ref 39–50)
HCT VFR BLDA CALC: 25 % — LOW (ref 39–50)
HGB BLD CALC-MCNC: 8 G/DL — LOW (ref 13–17)
HGB BLD-MCNC: 7 G/DL — CRITICAL LOW (ref 13–17)
HGB BLD-MCNC: 7.1 G/DL — LOW (ref 13–17)
HGB BLD-MCNC: 8.3 G/DL — LOW (ref 13–17)
HGB BLD-MCNC: 8.6 G/DL — LOW (ref 13–17)
HOROWITZ INDEX BLDV+IHG-RTO: 21 — SIGNIFICANT CHANGE UP
HYPOCHROMIA BLD QL: SIGNIFICANT CHANGE UP
INR BLD: 1.29 RATIO — HIGH (ref 0.88–1.16)
LACTATE BLDV-MCNC: 3.2 MMOL/L — HIGH (ref 0.7–2)
LYMPHOCYTES # BLD AUTO: 0 % — LOW (ref 13–44)
LYMPHOCYTES # BLD AUTO: 0 K/UL — LOW (ref 1–3.3)
MACROCYTES BLD QL: SIGNIFICANT CHANGE UP
MAGNESIUM SERPL-MCNC: 2 MG/DL — SIGNIFICANT CHANGE UP (ref 1.6–2.6)
MANUAL SMEAR VERIFICATION: SIGNIFICANT CHANGE UP
MCHC RBC-ENTMCNC: 30.6 PG — SIGNIFICANT CHANGE UP (ref 27–34)
MCHC RBC-ENTMCNC: 30.8 PG — SIGNIFICANT CHANGE UP (ref 27–34)
MCHC RBC-ENTMCNC: 31 PG — SIGNIFICANT CHANGE UP (ref 27–34)
MCHC RBC-ENTMCNC: 31.4 PG — SIGNIFICANT CHANGE UP (ref 27–34)
MCHC RBC-ENTMCNC: 32.1 GM/DL — SIGNIFICANT CHANGE UP (ref 32–36)
MCHC RBC-ENTMCNC: 32.1 GM/DL — SIGNIFICANT CHANGE UP (ref 32–36)
MCHC RBC-ENTMCNC: 33.1 GM/DL — SIGNIFICANT CHANGE UP (ref 32–36)
MCHC RBC-ENTMCNC: 33.9 GM/DL — SIGNIFICANT CHANGE UP (ref 32–36)
MCV RBC AUTO: 91 FL — SIGNIFICANT CHANGE UP (ref 80–100)
MCV RBC AUTO: 93.7 FL — SIGNIFICANT CHANGE UP (ref 80–100)
MCV RBC AUTO: 95.2 FL — SIGNIFICANT CHANGE UP (ref 80–100)
MCV RBC AUTO: 97.8 FL — SIGNIFICANT CHANGE UP (ref 80–100)
METHOD TYPE: SIGNIFICANT CHANGE UP
MONOCYTES # BLD AUTO: 0.67 K/UL — SIGNIFICANT CHANGE UP (ref 0–0.9)
MONOCYTES NFR BLD AUTO: 1.7 % — LOW (ref 2–14)
NEUTROPHILS # BLD AUTO: 38.41 K/UL — HIGH (ref 1.8–7.4)
NEUTROPHILS NFR BLD AUTO: 91.3 % — HIGH (ref 43–77)
NEUTS BAND # BLD: 6.1 % — SIGNIFICANT CHANGE UP (ref 0–8)
NRBC # BLD: 0 /100 WBCS — SIGNIFICANT CHANGE UP (ref 0–0)
ORGANISM # SPEC MICROSCOPIC CNT: SIGNIFICANT CHANGE UP
ORGANISM # SPEC MICROSCOPIC CNT: SIGNIFICANT CHANGE UP
OTHER CELLS CSF MANUAL: 5 ML/DL — LOW (ref 18–22)
OVALOCYTES BLD QL SMEAR: SLIGHT — SIGNIFICANT CHANGE UP
PCO2 BLDV: 42 MMHG — SIGNIFICANT CHANGE UP (ref 35–50)
PH BLDV: 7.36 — SIGNIFICANT CHANGE UP (ref 7.35–7.45)
PHOSPHATE SERPL-MCNC: 6.6 MG/DL — HIGH (ref 2.5–4.5)
PLAT MORPH BLD: NORMAL — SIGNIFICANT CHANGE UP
PLATELET # BLD AUTO: 404 K/UL — HIGH (ref 150–400)
PLATELET # BLD AUTO: 498 K/UL — HIGH (ref 150–400)
PLATELET # BLD AUTO: 503 K/UL — HIGH (ref 150–400)
PLATELET # BLD AUTO: 514 K/UL — HIGH (ref 150–400)
PO2 BLDV: 28 MMHG — SIGNIFICANT CHANGE UP (ref 25–45)
POIKILOCYTOSIS BLD QL AUTO: SLIGHT — SIGNIFICANT CHANGE UP
POLYCHROMASIA BLD QL SMEAR: SLIGHT — SIGNIFICANT CHANGE UP
POTASSIUM BLDV-SCNC: 4.4 MMOL/L — SIGNIFICANT CHANGE UP (ref 3.5–5.3)
POTASSIUM SERPL-MCNC: 4.3 MMOL/L — SIGNIFICANT CHANGE UP (ref 3.5–5.3)
POTASSIUM SERPL-SCNC: 4.3 MMOL/L — SIGNIFICANT CHANGE UP (ref 3.5–5.3)
PROCALCITONIN SERPL-MCNC: 2.5 NG/ML — HIGH (ref 0.02–0.1)
PROT SERPL-MCNC: 6.2 G/DL — SIGNIFICANT CHANGE UP (ref 6–8.3)
PROTHROM AB SERPL-ACNC: 14.9 SEC — HIGH (ref 10–12.9)
RBC # BLD: 2.26 M/UL — LOW (ref 4.2–5.8)
RBC # BLD: 2.29 M/UL — LOW (ref 4.2–5.8)
RBC # BLD: 2.68 M/UL — LOW (ref 4.2–5.8)
RBC # BLD: 2.79 M/UL — LOW (ref 4.2–5.8)
RBC # FLD: 15.9 % — HIGH (ref 10.3–14.5)
RBC # FLD: 16.7 % — HIGH (ref 10.3–14.5)
RBC # FLD: 17.2 % — HIGH (ref 10.3–14.5)
RBC # FLD: 17.3 % — HIGH (ref 10.3–14.5)
RBC BLD AUTO: ABNORMAL
SAO2 % BLDV: 44 % — LOW (ref 67–88)
SODIUM SERPL-SCNC: 132 MMOL/L — LOW (ref 135–145)
SPECIMEN SOURCE: SIGNIFICANT CHANGE UP
TARGETS BLD QL SMEAR: SLIGHT — SIGNIFICANT CHANGE UP
VANCOMYCIN FLD-MCNC: 12 UG/ML — SIGNIFICANT CHANGE UP
WBC # BLD: 29.8 K/UL — HIGH (ref 3.8–10.5)
WBC # BLD: 31.61 K/UL — HIGH (ref 3.8–10.5)
WBC # BLD: 39.44 K/UL — HIGH (ref 3.8–10.5)
WBC # BLD: 46.14 K/UL — CRITICAL HIGH (ref 3.8–10.5)
WBC # FLD AUTO: 29.8 K/UL — HIGH (ref 3.8–10.5)
WBC # FLD AUTO: 31.61 K/UL — HIGH (ref 3.8–10.5)
WBC # FLD AUTO: 39.44 K/UL — HIGH (ref 3.8–10.5)
WBC # FLD AUTO: 46.14 K/UL — CRITICAL HIGH (ref 3.8–10.5)

## 2020-01-07 PROCEDURE — 99232 SBSQ HOSP IP/OBS MODERATE 35: CPT | Mod: GC

## 2020-01-07 PROCEDURE — 99233 SBSQ HOSP IP/OBS HIGH 50: CPT

## 2020-01-07 PROCEDURE — 99232 SBSQ HOSP IP/OBS MODERATE 35: CPT

## 2020-01-07 PROCEDURE — 76776 US EXAM K TRANSPL W/DOPPLER: CPT | Mod: 26,RT

## 2020-01-07 PROCEDURE — 71045 X-RAY EXAM CHEST 1 VIEW: CPT | Mod: 26

## 2020-01-07 PROCEDURE — 99222 1ST HOSP IP/OBS MODERATE 55: CPT

## 2020-01-07 RX ORDER — AMIKACIN SULFATE 250 MG/ML
300 INJECTION, SOLUTION INTRAMUSCULAR; INTRAVENOUS ONCE
Refills: 0 | Status: DISCONTINUED | OUTPATIENT
Start: 2020-01-07 | End: 2020-01-07

## 2020-01-07 RX ORDER — OXYBUTYNIN CHLORIDE 5 MG
10 TABLET ORAL EVERY 8 HOURS
Refills: 0 | Status: DISCONTINUED | OUTPATIENT
Start: 2020-01-07 | End: 2020-01-10

## 2020-01-07 RX ORDER — OXYBUTYNIN CHLORIDE 5 MG
5 TABLET ORAL EVERY 8 HOURS
Refills: 0 | Status: DISCONTINUED | OUTPATIENT
Start: 2020-01-07 | End: 2020-01-07

## 2020-01-07 RX ORDER — AMIKACIN SULFATE 250 MG/ML
475 INJECTION, SOLUTION INTRAMUSCULAR; INTRAVENOUS ONCE
Refills: 0 | Status: COMPLETED | OUTPATIENT
Start: 2020-01-07 | End: 2020-01-07

## 2020-01-07 RX ORDER — HYDROMORPHONE HYDROCHLORIDE 2 MG/ML
0.5 INJECTION INTRAMUSCULAR; INTRAVENOUS; SUBCUTANEOUS ONCE
Refills: 0 | Status: DISCONTINUED | OUTPATIENT
Start: 2020-01-07 | End: 2020-01-07

## 2020-01-07 RX ORDER — SODIUM CHLORIDE 9 MG/ML
1000 INJECTION, SOLUTION INTRAVENOUS
Refills: 0 | Status: DISCONTINUED | OUTPATIENT
Start: 2020-01-07 | End: 2020-01-07

## 2020-01-07 RX ORDER — MEROPENEM 1 G/30ML
500 INJECTION INTRAVENOUS EVERY 24 HOURS
Refills: 0 | Status: DISCONTINUED | OUTPATIENT
Start: 2020-01-07 | End: 2020-01-23

## 2020-01-07 RX ORDER — LIDOCAINE 4 G/100G
1 CREAM TOPICAL ONCE
Refills: 0 | Status: COMPLETED | OUTPATIENT
Start: 2020-01-07 | End: 2020-01-07

## 2020-01-07 RX ADMIN — POLYETHYLENE GLYCOL 3350 17 GRAM(S): 17 POWDER, FOR SOLUTION ORAL at 11:06

## 2020-01-07 RX ADMIN — Medication 100 MILLIGRAM(S): at 11:06

## 2020-01-07 RX ADMIN — SENNA PLUS 2 TABLET(S): 8.6 TABLET ORAL at 22:05

## 2020-01-07 RX ADMIN — TRAMADOL HYDROCHLORIDE 50 MILLIGRAM(S): 50 TABLET ORAL at 15:30

## 2020-01-07 RX ADMIN — HYDROMORPHONE HYDROCHLORIDE 0.25 MILLIGRAM(S): 2 INJECTION INTRAMUSCULAR; INTRAVENOUS; SUBCUTANEOUS at 00:23

## 2020-01-07 RX ADMIN — AMIKACIN SULFATE 101.9 MILLIGRAM(S): 250 INJECTION, SOLUTION INTRAMUSCULAR; INTRAVENOUS at 11:06

## 2020-01-07 RX ADMIN — HYDROMORPHONE HYDROCHLORIDE 0.25 MILLIGRAM(S): 2 INJECTION INTRAMUSCULAR; INTRAVENOUS; SUBCUTANEOUS at 20:45

## 2020-01-07 RX ADMIN — Medication 5 MILLIGRAM(S): at 19:00

## 2020-01-07 RX ADMIN — HYDROMORPHONE HYDROCHLORIDE 0.25 MILLIGRAM(S): 2 INJECTION INTRAMUSCULAR; INTRAVENOUS; SUBCUTANEOUS at 20:29

## 2020-01-07 RX ADMIN — HYDROMORPHONE HYDROCHLORIDE 0.25 MILLIGRAM(S): 2 INJECTION INTRAMUSCULAR; INTRAVENOUS; SUBCUTANEOUS at 06:37

## 2020-01-07 RX ADMIN — TRAMADOL HYDROCHLORIDE 50 MILLIGRAM(S): 50 TABLET ORAL at 15:45

## 2020-01-07 RX ADMIN — SODIUM CHLORIDE 50 MILLILITER(S): 9 INJECTION, SOLUTION INTRAVENOUS at 09:02

## 2020-01-07 RX ADMIN — TRAMADOL HYDROCHLORIDE 50 MILLIGRAM(S): 50 TABLET ORAL at 22:00

## 2020-01-07 RX ADMIN — HYDROMORPHONE HYDROCHLORIDE 0.5 MILLIGRAM(S): 2 INJECTION INTRAMUSCULAR; INTRAVENOUS; SUBCUTANEOUS at 09:00

## 2020-01-07 RX ADMIN — LATANOPROST 1 DROP(S): 0.05 SOLUTION/ DROPS OPHTHALMIC; TOPICAL at 11:58

## 2020-01-07 RX ADMIN — Medication 5 MILLIGRAM(S): at 05:33

## 2020-01-07 RX ADMIN — TRAMADOL HYDROCHLORIDE 50 MILLIGRAM(S): 50 TABLET ORAL at 21:30

## 2020-01-07 RX ADMIN — GABAPENTIN 100 MILLIGRAM(S): 400 CAPSULE ORAL at 05:33

## 2020-01-07 RX ADMIN — SODIUM CHLORIDE 30 MILLILITER(S): 9 INJECTION, SOLUTION INTRAVENOUS at 05:32

## 2020-01-07 RX ADMIN — Medication 5 MILLIGRAM(S): at 11:07

## 2020-01-07 RX ADMIN — HYDROMORPHONE HYDROCHLORIDE 0.25 MILLIGRAM(S): 2 INJECTION INTRAMUSCULAR; INTRAVENOUS; SUBCUTANEOUS at 00:08

## 2020-01-07 RX ADMIN — FAMOTIDINE 20 MILLIGRAM(S): 10 INJECTION INTRAVENOUS at 11:06

## 2020-01-07 RX ADMIN — MEROPENEM 100 MILLIGRAM(S): 1 INJECTION INTRAVENOUS at 22:05

## 2020-01-07 RX ADMIN — HYDROMORPHONE HYDROCHLORIDE 0.5 MILLIGRAM(S): 2 INJECTION INTRAMUSCULAR; INTRAVENOUS; SUBCUTANEOUS at 09:15

## 2020-01-07 RX ADMIN — FLUCONAZOLE 400 MILLIGRAM(S): 150 TABLET ORAL at 11:07

## 2020-01-07 RX ADMIN — HYDROMORPHONE HYDROCHLORIDE 0.25 MILLIGRAM(S): 2 INJECTION INTRAMUSCULAR; INTRAVENOUS; SUBCUTANEOUS at 06:22

## 2020-01-07 RX ADMIN — GABAPENTIN 100 MILLIGRAM(S): 400 CAPSULE ORAL at 18:12

## 2020-01-07 RX ADMIN — BRIMONIDINE TARTRATE 1 DROP(S): 2 SOLUTION/ DROPS OPHTHALMIC at 22:06

## 2020-01-07 RX ADMIN — BRIMONIDINE TARTRATE 1 DROP(S): 2 SOLUTION/ DROPS OPHTHALMIC at 14:44

## 2020-01-07 RX ADMIN — BRIMONIDINE TARTRATE 1 DROP(S): 2 SOLUTION/ DROPS OPHTHALMIC at 05:33

## 2020-01-07 RX ADMIN — Medication 1 TABLET(S): at 11:06

## 2020-01-07 RX ADMIN — Medication 1 MILLILITER(S): at 22:06

## 2020-01-07 RX ADMIN — CHLORHEXIDINE GLUCONATE 1 APPLICATION(S): 213 SOLUTION TOPICAL at 05:33

## 2020-01-07 RX ADMIN — SODIUM CHLORIDE 30 MILLILITER(S): 9 INJECTION, SOLUTION INTRAVENOUS at 00:01

## 2020-01-07 NOTE — PROGRESS NOTE ADULT - SUBJECTIVE AND OBJECTIVE BOX
Events noted, extubated, awake, alert, seen in ICU on HD, s/p 2u PRBCs this am    Vital Signs Last 24 Hrs  T(C): 36.5 (01-07-20 @ 15:00), Max: 37.5 (01-07-20 @ 07:00)  T(F): 97.7 (01-07-20 @ 15:00), Max: 99.5 (01-07-20 @ 07:00)  HR: 71 (01-07-20 @ 15:00) (60 - 102)  BP: 106/63 (01-07-20 @ 15:00) (92/52 - 170/79)  BP(mean): 79 (01-07-20 @ 15:00) (66 - 119)  RR: 21 (01-07-20 @ 15:00) (8 - 32)  SpO2: 100% (01-07-20 @ 15:00) (83% - 100%)    Card S1S2  Lungs b/l air entry  Abd soft, ND  Extr + edema, AVF patent                                                                 7.0    46.14 )-----------( 503      ( 07 Jan 2020 09:54 )             21.8     07 Jan 2020 00:39    132    |  93     |  43     ----------------------------<  107    4.3     |  20     |  7.39     Ca    8.3        07 Jan 2020 00:39  Phos  6.6       07 Jan 2020 00:39  Mg     2.0       07 Jan 2020 00:39    TPro  6.2    /  Alb  2.7    /  TBili  0.6    /  DBili  0.3    /  AST  8      /  ALT  5      /  AlkPhos  194    07 Jan 2020 00:39    LIVER FUNCTIONS - ( 07 Jan 2020 00:39 )  Alb: 2.7 g/dL / Pro: 6.2 g/dL / ALK PHOS: 194 U/L / ALT: 5 U/L / AST: 8 U/L / GGT: x           PT/INR - ( 07 Jan 2020 00:35 )   PT: 14.9 sec;   INR: 1.29 ratio      Culture - Body Fluid with Gram Stain (collected 06 Jan 2020 21:03)  Source: Abdominal Fl Abdominal Fluid  Gram Stain (06 Jan 2020 23:41):    No polymorphonuclear leukocytes seen    No organisms seen    by cytocentrifuge    Culture - Other (collected 05 Jan 2020 13:59)  Source: Skin from incision  Final Report (07 Jan 2020 11:15):    No growth at 48 hours    Culture - Urine (collected 05 Jan 2020 13:59)  Source: .Urine Clean Catch (Midstream)  Preliminary Report (06 Jan 2020 18:09):    >100,000 CFU/ml Gram Negative Rods    acetaminophen   Tablet .. 650 milliGRAM(s) Oral every 6 hours PRN  allopurinol 100 milliGRAM(s) Oral daily  brimonidine 0.2% Ophthalmic Solution 1 Drop(s) Both EYES three times a day  chlorhexidine 2% Cloths 1 Application(s) Topical <User Schedule>  dextrose 5% + sodium chloride 0.9%. 1000 milliLiter(s) IV Continuous <Continuous>  famotidine    Tablet 20 milliGRAM(s) Oral daily  fluconAZOLE   Tablet 400 milliGRAM(s) Oral daily  gabapentin 100 milliGRAM(s) Oral two times a day  HYDROmorphone  Injectable 0.25 milliGRAM(s) IV Push every 4 hours PRN  latanoprost 0.005% Ophthalmic Solution 1 Drop(s) Both EYES daily  lidocaine 2% Jelly 1 milliLiter(s) IntraUrethral two times a day PRN  meropenem  IVPB 500 milliGRAM(s) IV Intermittent every 24 hours  oxybutynin 5 milliGRAM(s) Oral every 8 hours PRN  polyethylene glycol 3350 17 Gram(s) Oral daily  predniSONE   Tablet 5 milliGRAM(s) Oral daily  senna 2 Tablet(s) Oral at bedtime  traMADol 50 milliGRAM(s) Oral every 6 hours PRN  traMADol 25 milliGRAM(s) Oral every 6 hours PRN  trimethoprim   80 mG/sulfamethoxazole 400 mG 1 Tablet(s) Oral daily  valGANciclovir 450 milliGRAM(s) Oral <User Schedule>    A/P:    Adm w/Flu A 12/27  Hx ESRD on HD  S/p DDRT 12/8/2019, DGF  S/p transplant kidney bx 12/13: ATN, focal TMA  S/p PLEX 12/13 and 12/15  Carlos-nephric hematoma noted on ultrasound 12/27, increased from prior  S/p OR 1/1 for washout and repeat renal graft biopsy  Repeat renal bx c/w ATN  Infected carlos-nephric hematoma, s/p drainage w/IR  Abx per ID  HD today, min fluid removal per d/w transplant team   f/u noted  Close f/u of renal fx, UO  Avoid hypotension  Avoid nephrotoxins

## 2020-01-07 NOTE — PROGRESS NOTE ADULT - SUBJECTIVE AND OBJECTIVE BOX
SICU DAILY PROGRESS NOTE      KAYLA PA is a 49y Male PMHx of ESRD on HD  (via LUE AVF), HTN, Gout, Glaucoma, PNET s/p robotic distal panc/splenectomy (at [place] 2015 by Dr. Young, followed by Dr. Raphael, [facility] Oncologist), RA with hand contractures. 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 Post op course c/b DGF requiring HD, thrombocytopenia, elevated LDH and Haptoglobulin. Schistocytes  on peripheral smear concerning for TMA. Envarsus held. Received Belatacept 12/13. Started on PLEX (12/12, 12/15). Underwent renal bx on 12/13 c/w profound ATN.  Found to have much improvement to levels, likely related to Tacrolimus. He was discharged home on 12/20/19 w/ outpatient HD.     He was now sent to ED by dialysis center after he was found to be febrile to 102, he did not get HD this AM, and last full HD session was on 12/26. Upon arrival, he was febrile to 101.7, other vitals were stable. He states that he had a cough that began 5-6 days ago that has now mostly resolved. He reports no other febrile episodes other than this morning. He admits to having one SOB episode earlier this week that resolved after he got HD. He also states that his mother was hospitalized earlier this week and was flu+. Currently makes less than 1 cup urine/daily. He currently denies SOB, CP, dyspnea, HA, dizziness, N/V, diarrhea, constipation.     1/1/2020 was taken to OR and found to have a perinephric hematoma. Underwent washout, evacuation of hematoma, and biopsy of transplanted kidney. He was recovering on the floor when overnight he spiked a fever to 38.1 and had 18 beats of wide complex tachycardia. He was also found to have worsening leukocytosis and became acutely hypotensive. A CT scan was obtained demonstrating a 4.5x3.7 perinephric collection. IR was consulted for drainage and SICU was consulted for hemodynamic monitoring.      24 HOUR EVENTS:    -IR performed CT-guided aspiration of pelvic perinephric fluid collection; 18cc of clear pale yellow fluid drained.  -600cc of juan blood and clot discharged from penile meatus around 8:40 PM.  -Urology consulted, CBI placed.  Yellow urine followed once all the blood was out.  -One unit of pRBC given 2/2 drop in H/H to 7.1/22.1.  --SQH d/c'd.    SUBJECTIVE/ROS:  [ ] A ten-point review of systems was otherwise negative except as noted.  [ ] Due to altered mental status/intubation, subjective information were not able to be obtained from the patient. History was obtained, to the extent possible, from review of the chart and collateral sources of information.      NEURO    Exam: awake, alert, oriented  Meds: acetaminophen   Tablet .. 650 milliGRAM(s) Oral every 6 hours PRN Mild Pain (1 - 3)  gabapentin 100 milliGRAM(s) Oral two times a day  HYDROmorphone  Injectable 0.25 milliGRAM(s) IV Push every 4 hours PRN Breakthrough pain  traMADol 50 milliGRAM(s) Oral every 6 hours PRN Severe Pain (7 - 10)  traMADol 25 milliGRAM(s) Oral every 6 hours PRN Moderate Pain (4 - 6)    [x] Adequacy of sedation and pain control has been assessed and adjusted      RESPIRATORY  RR: 13 (01-07-20 @ 01:00) (12 - 39)  SpO2: 100% (01-07-20 @ 01:00) (83% - 100%)  Wt(kg): --  Exam: unlabored, clear to auscultation bilaterally  Mechanical Ventilation:         CARDIOVASCULAR  HR: 80 (01-07-20 @ 01:00) (60 - 92)  BP: 151/81 (01-07-20 @ 01:00) (106/66 - 167/84)  BP(mean): 108 (01-07-20 @ 01:00) (80 - 119)  ABP: --  ABP(mean): --  Wt(kg): --  CVP(cm H2O): --      Exam: regular rate and rhythm  Cardiac Rhythm: sinus  Perfusion     [x]Adequate   [ ]Inadequate  Mentation   [x]Normal       [ ]Reduced  Extremities  [x]Warm         [ ]Cool  Volume Status [ ]Hypervolemic [x]Euvolemic [ ]Hypovolemic  Meds:       GI/NUTRITION  Exam: soft, nontender, nondistended, incision C/D/I  Diet:  Meds: famotidine    Tablet 20 milliGRAM(s) Oral daily  polyethylene glycol 3350 17 Gram(s) Oral daily  senna 2 Tablet(s) Oral at bedtime      GENITOURINARY  I&O's Detail    01-05 @ 07:01  -  01-06 @ 07:00  --------------------------------------------------------  IN:    dextrose 5% + sodium chloride 0.45%.: 210 mL    dextrose 5% + sodium chloride 0.45%.: 425 mL    Oral Fluid: 550 mL    Solution: 50 mL  Total IN: 1235 mL    OUT:    Voided: 230 mL  Total OUT: 230 mL    Total NET: 1005 mL      01-06 @ 07:01 - 01-07 @ 01:35  --------------------------------------------------------  IN:    dextrose 5% + sodium chloride 0.45%.: 30 mL    dextrose 5% + sodium chloride 0.9%: 150 mL    dextrose 5% + sodium chloride 0.9%.: 210 mL    Solution: 100 mL  Total IN: 490 mL    OUT:    Indwelling Catheter - Urethral: 175 mL    Voided: 700 mL  Total OUT: 875 mL    Total NET: -385 mL          01-07    132<L>  |  93<L>  |  43<H>  ----------------------------<  107<H>  4.3   |  20<L>  |  7.39<H>    Ca    8.3<L>      07 Jan 2020 00:39  Phos  6.6     01-07  Mg     2.0     01-07    TPro  6.2  /  Alb  2.7<L>  /  TBili  0.6  /  DBili  0.3<H>  /  AST  8<L>  /  ALT  5<L>  /  AlkPhos  194<H>  01-07    [ ] Kennedy catheter, indication: N/A  Meds: dextrose 5% + sodium chloride 0.9%. 1000 milliLiter(s) IV Continuous <Continuous>        HEMATOLOGIC  Meds:   [x] VTE Prophylaxis                        7.1    29.80 )-----------( 514      ( 07 Jan 2020 00:35 )             22.1     PT/INR - ( 07 Jan 2020 00:35 )   PT: 14.9 sec;   INR: 1.29 ratio         PTT - ( 07 Jan 2020 00:35 )  PTT:31.2 sec  Transfusion     [ ] PRBC   [ ] Platelets   [ ] FFP   [ ] Cryoprecipitate      INFECTIOUS DISEASES  WBC Count: 29.80 K/uL (01-07 @ 00:35)  WBC Count: 19.79 K/uL (01-06 @ 20:45)  WBC Count: 25.60 K/uL (01-06 @ 04:55)    RECENT CULTURES:  Specimen Source: Abdominal Fl Abdominal Fluid  Date/Time: 01-06 @ 21:03  Culture Results: --  Gram Stain:   No polymorphonuclear leukocytes seen  No organisms seen  by cytocentrifuge  Organism: --  Specimen Source: .Urine Clean Catch (Midstream)  Date/Time: 01-05 @ 13:59  Culture Results:   >100,000 CFU/ml Gram Negative Rods  Gram Stain: --  Organism: --  Specimen Source: .Blood Blood  Date/Time: 01-04 @ 13:07  Culture Results:   No growth to date.  Gram Stain: --  Organism: --    Meds: fluconAZOLE   Tablet 400 milliGRAM(s) Oral daily  meropenem  IVPB 500 milliGRAM(s) IV Intermittent every 24 hours  trimethoprim   80 mG/sulfamethoxazole 400 mG 1 Tablet(s) Oral daily  valGANciclovir 450 milliGRAM(s) Oral <User Schedule>        ENDOCRINE  CAPILLARY BLOOD GLUCOSE        Meds: allopurinol 100 milliGRAM(s) Oral daily  predniSONE   Tablet 5 milliGRAM(s) Oral daily        ACCESS DEVICES:  [ ] Peripheral IV  [ ] Central Venous Line	[ ] R	[ ] L	[ ] IJ	[ ] Fem	[ ] SC	Placed:   [ ] Arterial Line		[ ] R	[ ] L	[ ] Fem	[ ] Rad	[ ] Ax	Placed:   [ ] PICC:					[ ] Mediport  [ ] Urinary Catheter, Date Placed:   [x] Necessity of urinary, arterial, and venous catheters discussed    OTHER MEDICATIONS:  brimonidine 0.2% Ophthalmic Solution 1 Drop(s) Both EYES three times a day  chlorhexidine 2% Cloths 1 Application(s) Topical <User Schedule>  latanoprost 0.005% Ophthalmic Solution 1 Drop(s) Both EYES daily  lidocaine 2% Jelly 1 milliLiter(s) IntraUrethral two times a day PRN      CODE STATUS:      IMAGING: SICU DAILY PROGRESS NOTE      KAYLA PA is a 49y Male PMHx of ESRD on HD  (via LUE AVF), HTN, Gout, Glaucoma, PNET s/p robotic distal panc/splenectomy (at [place] 2015 by Dr. Young, followed by Dr. Raphael, [facility] Oncologist), RA with hand contractures. 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 Post op course c/b DGF requiring HD, thrombocytopenia, elevated LDH and Haptoglobulin. Schistocytes  on peripheral smear concerning for TMA. Envarsus held. Received Belatacept 12/13. Started on PLEX (12/12, 12/15). Underwent renal bx on 12/13 c/w profound ATN.  Found to have much improvement to levels, likely related to Tacrolimus. He was discharged home on 12/20/19 w/ outpatient HD.     He was now sent to ED by dialysis center after he was found to be febrile to 102, he did not get HD this AM, and last full HD session was on 12/26. Upon arrival, he was febrile to 101.7, other vitals were stable. He states that he had a cough that began 5-6 days ago that has now mostly resolved. He reports no other febrile episodes other than this morning. He admits to having one SOB episode earlier this week that resolved after he got HD. He also states that his mother was hospitalized earlier this week and was flu+. Currently makes less than 1 cup urine/daily. He currently denies SOB, CP, dyspnea, HA, dizziness, N/V, diarrhea, constipation.     1/1/2020 was taken to OR and found to have a perinephric hematoma. Underwent washout, evacuation of hematoma, and biopsy of transplanted kidney. He was recovering on the floor when overnight he spiked a fever to 38.1 and had 18 beats of wide complex tachycardia. He was also found to have worsening leukocytosis and became acutely hypotensive. A CT scan was obtained demonstrating a 4.5x3.7 perinephric collection. IR was consulted for drainage and SICU was consulted for hemodynamic monitoring.      24 HOUR EVENTS:  - Incision opened at bedside by transplant attending  - IR performed CT-guided aspiration of pelvic perinephric fluid collection; 18cc of clear pale yellow fluid drained.  - 600cc of juan blood and clot discharged from penile meatus around 8:40 PM.  - Urology consulted, Kennedy with irrigation port placed.  Yellow urine followed once all the blood was out.  - One unit of PRBC given 2/2 drop in H/H to 7.1/22.1.  - SQH d/c'd.    SUBJECTIVE/ROS:  [x] A ten-point review of systems was otherwise negative except as noted.  [ ] Due to altered mental status/intubation, subjective information were not able to be obtained from the patient. History was obtained, to the extent possible, from review of the chart and collateral sources of information.      NEURO    Exam: awake, alert, oriented  Meds: acetaminophen   Tablet .. 650 milliGRAM(s) Oral every 6 hours PRN Mild Pain (1 - 3)  gabapentin 100 milliGRAM(s) Oral two times a day  HYDROmorphone  Injectable 0.25 milliGRAM(s) IV Push every 4 hours PRN Breakthrough pain  traMADol 50 milliGRAM(s) Oral every 6 hours PRN Severe Pain (7 - 10)  traMADol 25 milliGRAM(s) Oral every 6 hours PRN Moderate Pain (4 - 6)    [x] Adequacy of sedation and pain control has been assessed and adjusted      RESPIRATORY  RR: 13 (01-07-20 @ 01:00) (12 - 39)  SpO2: 100% (01-07-20 @ 01:00) (83% - 100%)  Wt(kg): --  Exam: unlabored, clear to auscultation bilaterally  Mechanical Ventilation:         CARDIOVASCULAR  HR: 80 (01-07-20 @ 01:00) (60 - 92)  BP: 151/81 (01-07-20 @ 01:00) (106/66 - 167/84)  BP(mean): 108 (01-07-20 @ 01:00) (80 - 119)  ABP: --  ABP(mean): --  Wt(kg): --  CVP(cm H2O): --      Exam: regular rate and rhythm  Cardiac Rhythm: sinus  Perfusion     [x]Adequate   [ ]Inadequate  Mentation   [x]Normal       [ ]Reduced  Extremities  [x]Warm         [ ]Cool  Volume Status [ ]Hypervolemic [x]Euvolemic [ ]Hypovolemic  Meds:       GI/NUTRITION  Exam: soft, nontender, nondistended, incision C/D/I  Diet:  Meds: famotidine    Tablet 20 milliGRAM(s) Oral daily  polyethylene glycol 3350 17 Gram(s) Oral daily  senna 2 Tablet(s) Oral at bedtime      GENITOURINARY  I&O's Detail    01-05 @ 07:01  -  01-06 @ 07:00  --------------------------------------------------------  IN:    dextrose 5% + sodium chloride 0.45%.: 210 mL    dextrose 5% + sodium chloride 0.45%.: 425 mL    Oral Fluid: 550 mL    Solution: 50 mL  Total IN: 1235 mL    OUT:    Voided: 230 mL  Total OUT: 230 mL    Total NET: 1005 mL      01-06 @ 07:01 - 01-07 @ 01:35  --------------------------------------------------------  IN:    dextrose 5% + sodium chloride 0.45%.: 30 mL    dextrose 5% + sodium chloride 0.9%: 150 mL    dextrose 5% + sodium chloride 0.9%.: 210 mL    Solution: 100 mL  Total IN: 490 mL    OUT:    Indwelling Catheter - Urethral: 175 mL    Voided: 700 mL  Total OUT: 875 mL    Total NET: -385 mL          01-07    132<L>  |  93<L>  |  43<H>  ----------------------------<  107<H>  4.3   |  20<L>  |  7.39<H>    Ca    8.3<L>      07 Jan 2020 00:39  Phos  6.6     01-07  Mg     2.0     01-07    TPro  6.2  /  Alb  2.7<L>  /  TBili  0.6  /  DBili  0.3<H>  /  AST  8<L>  /  ALT  5<L>  /  AlkPhos  194<H>  01-07    [ ] Kennedy catheter, indication: N/A  Meds: dextrose 5% + sodium chloride 0.9%. 1000 milliLiter(s) IV Continuous <Continuous>        HEMATOLOGIC  Meds:   [x] VTE Prophylaxis                        7.1    29.80 )-----------( 514      ( 07 Jan 2020 00:35 )             22.1     PT/INR - ( 07 Jan 2020 00:35 )   PT: 14.9 sec;   INR: 1.29 ratio         PTT - ( 07 Jan 2020 00:35 )  PTT:31.2 sec  Transfusion     [ ] PRBC   [ ] Platelets   [ ] FFP   [ ] Cryoprecipitate      INFECTIOUS DISEASES  WBC Count: 29.80 K/uL (01-07 @ 00:35)  WBC Count: 19.79 K/uL (01-06 @ 20:45)  WBC Count: 25.60 K/uL (01-06 @ 04:55)    RECENT CULTURES:  Specimen Source: Abdominal Fl Abdominal Fluid  Date/Time: 01-06 @ 21:03  Culture Results: --  Gram Stain:   No polymorphonuclear leukocytes seen  No organisms seen  by cytocentrifuge  Organism: --  Specimen Source: .Urine Clean Catch (Midstream)  Date/Time: 01-05 @ 13:59  Culture Results:   >100,000 CFU/ml Gram Negative Rods  Gram Stain: --  Organism: --  Specimen Source: .Blood Blood  Date/Time: 01-04 @ 13:07  Culture Results:   No growth to date.  Gram Stain: --  Organism: --    Meds: fluconAZOLE   Tablet 400 milliGRAM(s) Oral daily  meropenem  IVPB 500 milliGRAM(s) IV Intermittent every 24 hours  trimethoprim   80 mG/sulfamethoxazole 400 mG 1 Tablet(s) Oral daily  valGANciclovir 450 milliGRAM(s) Oral <User Schedule>        ENDOCRINE  CAPILLARY BLOOD GLUCOSE        Meds: allopurinol 100 milliGRAM(s) Oral daily  predniSONE   Tablet 5 milliGRAM(s) Oral daily        ACCESS DEVICES:  [x] Peripheral IV  [ ] Central Venous Line	[ ] R	[ ] L	[ ] IJ	[ ] Fem	[ ] SC	Placed:   [ ] Arterial Line		[ ] R	[ ] L	[ ] Fem	[ ] Rad	[ ] Ax	Placed:   [ ] PICC:					[ ] Mediport  [x] Urinary Catheter, Date Placed: 1/6/29  [x] Necessity of urinary, arterial, and venous catheters discussed    OTHER MEDICATIONS:  brimonidine 0.2% Ophthalmic Solution 1 Drop(s) Both EYES three times a day  chlorhexidine 2% Cloths 1 Application(s) Topical <User Schedule>  latanoprost 0.005% Ophthalmic Solution 1 Drop(s) Both EYES daily  lidocaine 2% Jelly 1 milliLiter(s) IntraUrethral two times a day PRN      CODE STATUS:      IMAGING:

## 2020-01-07 NOTE — CHART NOTE - NSCHARTNOTEFT_GEN_A_CORE
Interventional Radiology Follow- Up Note    49y Male s/p CT guided aspiration of pelvic perinephric fluid collection on 1/6/2020 in Interventional Radiology with Dr. Bro.     Patient seen and examined @ bedside. Site c/d/i. Patient seen just after Urology initiation of bladder irrigation via martinez; martinez collection device near full with bloody irrigation fluid.   Gram stain negative; approximately 18cc pale clear/yellow fluid was aspirated during procedure.   Patient endorses feeling tired and a bit run down, as he was woken up frequently through the night for lab work and medications. Notes that he has felt a lot worse in the past and is starting to feel a bit better. No complaints regarding procedure site, just wondering if he might need more blood as he was concerned overnight.    Vitals: T(F): 99.5 (01-07-20 @ 07:00), Max: 99.5 (01-07-20 @ 07:00)  HR: 91 (01-07-20 @ 08:00) (60 - 102)  BP: 99/64 (01-07-20 @ 08:00) (92/52 - 170/79)  RR: 15 (01-07-20 @ 08:00) (12 - 39)  SpO2: 97% (01-07-20 @ 08:00) (83% - 100%)    LABS:                        8.3    39.44 )-----------( 498      ( 07 Jan 2020 04:29 )             25.1     01-07    132<L>  |  93<L>  |  43<H>  ----------------------------<  107<H>  4.3   |  20<L>  |  7.39<H>    Ca    8.3<L>      07 Jan 2020 00:39  Phos  6.6     01-07  Mg     2.0     01-07    TPro  6.2  /  Alb  2.7<L>  /  TBili  0.6  /  DBili  0.3<H>  /  AST  8<L>  /  ALT  5<L>  /  AlkPhos  194<H>  01-07    PT/INR - ( 07 Jan 2020 00:35 )   PT: 14.9 sec;   INR: 1.29 ratio         PTT - ( 07 Jan 2020 00:35 )  PTT:31.2 sec  I&O's Detail    06 Jan 2020 07:01  -  07 Jan 2020 07:00  --------------------------------------------------------  IN:    dextrose 5% + sodium chloride 0.45%.: 30 mL    dextrose 5% + sodium chloride 0.9%: 390 mL    dextrose 5% + sodium chloride 0.9%: 150 mL    Solution: 100 mL  Total IN: 670 mL    OUT:    Indwelling Catheter - Urethral: 325 mL    Voided: 700 mL  Total OUT: 1025 mL    Total NET: -355 mL      07 Jan 2020 07:01  -  07 Jan 2020 09:17  --------------------------------------------------------  IN:    dextrose 5% + sodium chloride 0.9%: 30 mL  Total IN: 30 mL    OUT:  Total OUT: 0 mL    Total NET: 30 mL    PHYSICAL EXAM:  General: Nontoxic, in NAD  Neuro:  Alert & oriented x 3  CV: +S1+S2 regular rate and rhythm  Lung: clear to ausculation bilaterally, respirations nonlabored, good inspiratory effort  Abdomen: soft, mild tenderness to palpation in the suprapubic region. Martinez catheter with clear fluid within, however collection device with moderate amount of blood.   Extremities: no pedal edema or calf tenderness noted     Impression: 49y Male admitted with PMH of malleable penile prosthesis, ESRD on HD s/p DDRT on 12/8/19. Found to have perirenal hematoma s/p OR on 1/1 for drainage/washout; patient continued to fever with elevated white count. On CT found to have perinephric collection now s/p IR aspiration 1/6/2020 with removal of 18 cc pale fluid with negative gram stain. Post-operatively patient noted to have juan blood from the penile meatus and Urology was consulted. Per Urology, martinez collection device fluid is less dark blood than seen in urinal; given persistent blood after irrigation today, may be residual clot versus ongoing urologic issue.     PMH Erectile dysfunction  Glaucoma  Gout  HTN (hypertension)  ESRD (end stage renal disease) on dialysis  Contracture of finger joint  Carpal tunnel syndrome  Brain cyst  Anemia  Renal insufficiency  Gastric ulcer  Rheumatoid arthritis    Plan:  -follow up final culture of drainage  -martinez management per Urology  -trend vitals, labs     Please call IR at extension [phone] with any questions, concerns, or issues regarding above.

## 2020-01-07 NOTE — PROGRESS NOTE ADULT - PROBLEM SELECTOR PLAN 3
Hematoma in setting of biopsy and plasma exchange.  s/p prbc transfusion 12/29 Hematoma in setting of biopsy and plasma exchange.  s/p PRBC transfusion 12/29

## 2020-01-07 NOTE — PROGRESS NOTE ADULT - SUBJECTIVE AND OBJECTIVE BOX
[facility] - Melissa Kelsey, Enrique, Maximino, Marin, Ravi Goodson  Office Number:  [phone]    Patient resting comfortably in bed in NAD.  Laying flat with no respiratory distress.  No complaints of chest pain, dyspnea, palpitations, PND, or orthopnea.  s/p one unit PRBC.      F/U for:  NSVT    Telemetry:  NSR.  NO NSVT.  One episode of a ventricular couplet noted    MEDICATIONS  (STANDING):  allopurinol 100 milliGRAM(s) Oral daily  brimonidine 0.2% Ophthalmic Solution 1 Drop(s) Both EYES three times a day  chlorhexidine 2% Cloths 1 Application(s) Topical <User Schedule>  dextrose 5% + sodium chloride 0.9%. 1000 milliLiter(s) (30 mL/Hr) IV Continuous <Continuous>  famotidine    Tablet 20 milliGRAM(s) Oral daily  fluconAZOLE   Tablet 400 milliGRAM(s) Oral daily  gabapentin 100 milliGRAM(s) Oral two times a day  latanoprost 0.005% Ophthalmic Solution 1 Drop(s) Both EYES daily  meropenem  IVPB 500 milliGRAM(s) IV Intermittent every 24 hours  polyethylene glycol 3350 17 Gram(s) Oral daily  predniSONE   Tablet 5 milliGRAM(s) Oral daily  senna 2 Tablet(s) Oral at bedtime  trimethoprim   80 mG/sulfamethoxazole 400 mG 1 Tablet(s) Oral daily  valGANciclovir 450 milliGRAM(s) Oral <User Schedule>    MEDICATIONS  (PRN):  acetaminophen   Tablet .. 650 milliGRAM(s) Oral every 6 hours PRN Mild Pain (1 - 3)  HYDROmorphone  Injectable 0.25 milliGRAM(s) IV Push every 4 hours PRN Breakthrough pain  lidocaine 2% Jelly 1 milliLiter(s) IntraUrethral two times a day PRN irritation  traMADol 50 milliGRAM(s) Oral every 6 hours PRN Severe Pain (7 - 10)  traMADol 25 milliGRAM(s) Oral every 6 hours PRN Moderate Pain (4 - 6)      Allergies    coconut (Anaphylaxis)  morphine (Pruritus; Rash)    Intolerances        Vital Signs Last 24 Hrs  T(C): 37.5 (07 Jan 2020 07:00), Max: 37.5 (07 Jan 2020 07:00)  T(F): 99.5 (07 Jan 2020 07:00), Max: 99.5 (07 Jan 2020 07:00)  HR: 94 (07 Jan 2020 07:00) (60 - 102)  BP: 95/61 (07 Jan 2020 07:00) (92/52 - 170/79)  BP(mean): 73 (07 Jan 2020 07:00) (66 - 119)  RR: 17 (07 Jan 2020 07:00) (12 - 39)  SpO2: 96% (07 Jan 2020 07:00) (83% - 100%)    I&O's Summary    06 Jan 2020 07:01  -  07 Jan 2020 07:00  --------------------------------------------------------  IN: 670 mL / OUT: 1025 mL / NET: -355 mL    07 Jan 2020 07:01  -  07 Jan 2020 07:46  --------------------------------------------------------  IN: 30 mL / OUT: 0 mL / NET: 30 mL        ON EXAM:    General: NAD, awake and alert, oriented x 3  HEENT: Mucous membranes are moist, anicteric  Lungs: Non-labored, breath sounds are clear bilaterally, No wheezing, rales or rhonchi  Cardiovascular: Regular, S1 and S2, no murmurs, rubs, or gallops  Gastrointestinal: Bowel Sounds present, soft, nontender.   Lymph: No peripheral edema. No lymphadenopathy.  Skin: No rashes or ulcers  Psych:  Mood & affect appropriate    LABS: All Labs Reviewed:                        8.3    39.44 )-----------( 498      ( 07 Jan 2020 04:29 )             25.1                         7.1    29.80 )-----------( 514      ( 07 Jan 2020 00:35 )             22.1                         7.8    19.79 )-----------( 592      ( 06 Jan 2020 20:45 )             23.9     07 Jan 2020 00:39    132    |  93     |  43     ----------------------------<  107    4.3     |  20     |  7.39   06 Jan 2020 20:45    130    |  92     |  41     ----------------------------<  135    4.5     |  20     |  7.06   06 Jan 2020 04:55    131    |  91     |  38     ----------------------------<  98     4.3     |  26     |  6.37     Ca    8.3        07 Jan 2020 00:39  Ca    8.4        06 Jan 2020 20:45  Ca    8.9        06 Jan 2020 04:55  Phos  6.6       07 Jan 2020 00:39  Phos  5.9       06 Jan 2020 20:45  Phos  4.8       06 Jan 2020 04:55  Mg     2.0       07 Jan 2020 00:39  Mg     2.0       06 Jan 2020 20:45  Mg     2.0       06 Jan 2020 04:55    TPro  6.2    /  Alb  2.7    /  TBili  0.6    /  DBili  0.3    /  AST  8      /  ALT  5      /  AlkPhos  194    07 Jan 2020 00:39  TPro  6.3    /  Alb  2.8    /  TBili  0.6    /  DBili  0.3    /  AST  16     /  ALT  6      /  AlkPhos  201    06 Jan 2020 20:45  TPro  6.5    /  Alb  3.0    /  TBili  0.6    /  DBili  0.3    /  AST  12     /  ALT  <5     /  AlkPhos  216    06 Jan 2020 04:55    PT/INR - ( 07 Jan 2020 00:35 )   PT: 14.9 sec;   INR: 1.29 ratio         PTT - ( 07 Jan 2020 00:35 )  PTT:31.2 sec      Blood Culture: Organism --  Gram Stain Blood -- Gram Stain   No polymorphonuclear leukocytes seen  No organisms seen  by cytocentrifuge  Specimen Source Abdominal Fl Abdominal Fluid  Culture-Blood --    Organism --  Gram Stain Blood -- Gram Stain --  Specimen Source .Urine Clean Catch (Midstream)  Culture-Blood --    Organism --  Gram Stain Blood -- Gram Stain --  Specimen Source .Blood Blood  Culture-Blood --

## 2020-01-07 NOTE — PROGRESS NOTE ADULT - SUBJECTIVE AND OBJECTIVE BOX
Subjective  Pt initially w/mild hematuria overnight. This AM, pt reports bladder spasms. No fevers.    Objective    Vital signs  T(F): , Max: 99.5 (01-07-20 @ 07:00)  HR: 91 (01-07-20 @ 09:00)  BP: 98/60 (01-07-20 @ 09:00)  SpO2: 100% (01-07-20 @ 09:00)  Wt(kg): --    Output     01-06 @ 07:01  -  01-07 @ 07:00  --------------------------------------------------------  IN: 670 mL / OUT: 1025 mL / NET: -355 mL    01-07 @ 07:01  -  01-07 @ 11:29  --------------------------------------------------------  IN: 6080 mL / OUT: 0 mL / NET: 6080 mL        Gen: NAD  Abd: soft, NT, ND, no suprapubic distention  : martinez output dark red    Labs      01-07 @ 09:54    WBC 46.14 / Hct 21.8  / SCr --       01-07 @ 04:29    WBC 39.44 / Hct 25.1  / SCr --       Imaging

## 2020-01-07 NOTE — PROGRESS NOTE ADULT - ASSESSMENT
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 Post op course c/b DGF s/p renal bx on 12/13 c/b perirenal hematoma/abscess s/p washout 1/1. Patient is s/p IR aspiration of persistent collection seen inferior to pelvic kidney, now with new-onset gross hematuria    -- dark urine this AM, pt w/bladder spasms, irrigated manually - no return of clot.   -- started on CBI, clears easily, output clear pink on slow gtt CBI  -- Will keep martinez catheter in place  -- Monitor urine color; irrigate prn  -- Followup urine culture  -- Monitor H&H and vital signs

## 2020-01-07 NOTE — PROGRESS NOTE ADULT - ASSESSMENT
ASSESSMENT:  KAYLA PA is a 49y Male with history of ESRD on HD  (via LUE AVF), HTN, Gout, Glaucoma, PNET s/p robotic distal panc/splenectomy (at [place] 2015 by Dr. Young, followed by Dr. Raphael, [facility] Oncologist), RA with hand contractures who underwent DDRT on 12/8/19 (ureteral stent sutured to Kennedy - removed 12/15). Post op course c/b DGF requiring HD, thrombocytopenia, elevated LDH and Haptoglobulin. Schistocytes  on peripheral smear concerning for TMA. Envarsus held. Underwent renal bx on 12/13 c/w profound ATN. 1/1/2020 was taken to OR and found to have a perinephric hematoma. Underwent washout, evacuation of hematoma, and biopsy of transplanted kidney. He was recovering on the floor when overnight he spiked a fever to 38.1 and had 18 beats of wide complex tachycardia. He was also found to have worsening leukocytosis and became acutely hypotensive. A CT scan was obtained deomnstrating a 4.5x3.7 perinephric collection. IR was consulted for drainage and SICU was consulted for hemodynamic monitoring.    PLAN:    NEURO:  - Tylenol 650 mg q6 prn (mild)  - Tramadol 25/50 mg q6 prn (mod/severe)  - Gabapentin 100 mg BID  - Dilaudid 0.25mg for breakthrough pain.    RESPIRATORY:   - No active issues  - OOB as tolerated, PT    CARDIOVASCULAR:  - Monitor hemodynamics  - f/u cards recs for wide complex tachycardia  - Hold home hydralazine, metoprolol    GI/NUTRITION:  - NPO  - Famotidine 20 mg daily  - Senna 2 tabs qhs  - Transplant meds: prednisone 5 mg daily, allopurinol 100 mg daily  - MMF per transplant team    GENITOURINARY/RENAL:  - D5 1/2NS @30cc/hr  - Monitor electrolytes  - Continue w/ HD for now  - f/u renal biopsy pathology    HEMATOLOGIC:  - no SQH for VTE ppx  - Trend H/H  - LE duplex neg for DVT    INFECTIOUS DISEASE:  - Meropenem 500 mg daily  - PPX: fluconazole 200 mg daily, valganciclovir 450 mg three times weekly, bactrim 1 tab daily  - Appreciate ID input    ENDOCRINE:  - Monitor glucose on BMP    OPHTHALMOLOGY:  - Brimonidine/latanoprost drops ASSESSMENT:  KAYLA PA is a 49y Male with history of ESRD on HD  (via LUE AVF), HTN, Gout, Glaucoma, PNET s/p robotic distal panc/splenectomy (at [place] 2015 by Dr. Young, followed by Dr. Raphael, [facility] Oncologist), RA with hand contractures who underwent DDRT on 12/8/19 (ureteral stent sutured to Kennedy - removed 12/15). Post op course c/b DGF requiring HD, thrombocytopenia, elevated LDH and Haptoglobulin. Schistocytes  on peripheral smear concerning for TMA. Envarsus held. Underwent renal bx on 12/13 c/w profound ATN. 1/1/2020 was taken to OR and found to have a perinephric hematoma. Underwent washout, evacuation of hematoma, and biopsy of transplanted kidney. He was recovering on the floor when overnight he spiked a fever to 38.1 and had 18 beats of wide complex tachycardia. He was also found to have worsening leukocytosis and became acutely hypotensive. A CT scan was obtained deomnstrating a 4.5x3.7 perinephric collection. IR was consulted for drainage and SICU was consulted for hemodynamic monitoring.    PLAN:    NEURO:  - Tylenol 650 mg q6 prn (mild)  - Tramadol 25/50 mg q6 prn (mod/severe)  - Gabapentin 100 mg BID  - Dilaudid 0.25mg for breakthrough pain.    RESPIRATORY:   - No active issues  - OOB as tolerated, PT    CARDIOVASCULAR:  - Monitor hemodynamics  - f/u cards recs for wide complex tachycardia  - Hold home hydralazine, metoprolol    GI/NUTRITION:  - NPO  - Famotidine 20 mg daily  - Senna 2 tabs qhs  - Transplant meds: prednisone 5 mg daily, allopurinol 100 mg daily  - MMF per transplant team    GENITOURINARY/RENAL:  - D5 1/2NS @30cc/hr  - Monitor electrolytes  - Continue w/ HD for now  - f/u renal biopsy pathology  - Kennedy Catheter    HEMATOLOGIC:  - Holding SQH for VTE ppx  - Trend H/H  - LE duplex neg for DVT    INFECTIOUS DISEASE:  - Meropenem 500 mg daily  - PPX: fluconazole 200 mg daily, valganciclovir 450 mg three times weekly, bactrim 1 tab daily  - Appreciate ID input    ENDOCRINE:  - Monitor glucose on BMP    OPHTHALMOLOGY:  - Brimonidine/latanoprost drops

## 2020-01-07 NOTE — CHART NOTE - NSCHARTNOTEFT_GEN_A_CORE
RN called states the CBI not draining well.  seen and examined pt at bed side, CBI and Kennedy in place, draining light pink and slow  Hand irrigation w/ 1.5L water, about 20-30cc blood clot out, draining very light pink  pt has lot bladder spasm     Keep CBI over night   B&O for bladder spasm

## 2020-01-07 NOTE — PROGRESS NOTE ADULT - ASSESSMENT
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 on 12/27 with cough and fever.     RVP with Influenza A.   CXR Clear  s/p Tamiflu treatment course    UCx with > 100K Enterobacter  s/p Ex-Lap and washout of infected hematoma on 1/1  Growth of Enterobacter from surgical cultures  2 of the isolates intermediate/resistant to Ertapenem but rest susceptible  Unclear significance of this - would continue meropenem at this time    s/p IR guided drainage attempt of hematoma on 1/6  Significant hematuria with clots after procedure  Rise in leukocytosis over last 48 hours  Vancomycin and fluconazole added on 1/5    Reasonable to give dose of Amikacin today given significant rise in WBC  If no new growth on cultures tomorrow can add Ciprofloxacin to regimen (Enterobacter susceptible)  I suspect deterioration is due to manipulation of hematoma and incomplete source control    Overall, Leukocytosis (rising), Infected Hematoma, Positive Urine Culture, Influenza Infection, Fever, Renal Transplant Recipient    --Recommend Amikacin 7.5 mg/kg IV x1  --Recommend further source control for hematoma  --Continue Meropenem 500 mg IV Q24H  --If no further new growth on cultures can likely add Ciprofloxacin tomorrow to regimen.  --Can continue fluconazole but suspicion is low for fungal infection  --Continue to follow CBC with diff  --Continue to follow temperature curve  --Follow up on surgical swab culture from 1/5 and IR drainage 1/6  --Follow up on prelim blood cultures    I will continue to follow. Please feel free to contact me with any further questions.    Dada Jacobo M.D.  [facility] Division of Infectious Disease  8AM-5PM: Pager Number [phone]  After Hours (or if no response): Please contact the Infectious Diseases Office at [phone]

## 2020-01-07 NOTE — PROGRESS NOTE ADULT - ATTENDING COMMENTS
S/p IR drainage of infrarenal collection, developed gross hematuria on CBI  Drop in HCT, acute blood loss anemia, ordered for transfusion  Urine culture GNR, pt on Meropenem, one dose Amikacin after HD as per transplant and ID, procal level, repeat BC and fungitail  HD today  Renal US to assess perfusion  On low dose steroid as antirejection    Being followed by multidisciplinary team

## 2020-01-07 NOTE — PROGRESS NOTE ADULT - PROBLEM SELECTOR PLAN 4
UTI Gram negative rods  - continue meropenem IV empirical (started 12/30) UTI Gram negative rods  - continue meropenem IV empirical (started 12/30)  - Check blood culture, CXR.  - Will need to rule out fungal infection; check B2-glucan and Fungitell

## 2020-01-07 NOTE — PROGRESS NOTE ADULT - PROBLEM SELECTOR PLAN 1
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 tacrolimus) requiring HD, PLEX 12/12 and plasmapheresis 12/14/19. Switched tacrolimus to belatacept.    - Biopsy in OR (1/1/20) revealed ATN, no evidence of rejection.  - Had infected collection washed out, however developed fever and tachyarrhythmia, abdominal pain. Found to have diffusely enlarging perinephric fluid collection on CT imaging. Pt. now s/p CT guided drainage of fluid by IR team on 1/6/20. Culture currently without growth s/p DDRT (12/8/19) c/b DGF in setting ATN/focal TMA (? tacrolimus) requiring HD, PLEX 12/12 and plasmapheresis 12/14/19. Switched tacrolimus to belatacept.    - Biopsy in OR (1/1/20) revealed ATN, no evidence of rejection.  - Had infected collection washed out, however developed fever and tachyarrhythmia, abdominal pain. Found to have diffusely enlarging perinephric fluid collection on CT imaging. Pt. now s/p CT guided drainage of fluid by IR team on 1/6/20. Culture currently without growth.  - Recommend checking renal US.  - Recommend HD treatment today with possible blood transfusion.

## 2020-01-07 NOTE — PROGRESS NOTE ADULT - ASSESSMENT
49M PMHx of ESRD on HD  ( via LUE AVF), HTN, Gout, Glaucoma, NET of pancreas (s/p robotic distal panc/splenectomy at [place] 2015 by Dr. Young, followed by Dr. Raphael, [facility] Oncologist), RA with hand contractures. 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 Post op course c/b DGF requiring HD, thrombocytopenia, elevated LDH and Haptoglobulin. Schistocytes  on peripheral smear concerning for TMA. Envarsus held. Received Belatacept 12/13. Started on PLEX (12/12, 12/15). Underwent renal bx on 12/13 c/w profound ATN. Re-admitted with Influenza,  s/p hematoma evacuation/ abd washout and renal biopsy now with leukocytosis and fevers.       Influenza  - RVP + influenza A  - Completed Tamiflu      s/p DDRT c/b DGF/ATN   - US on admission with increased perinephric fluid collection  - POD 6 s/p ex lap/washout and renal txp bx (prelim c/w ATN, no rejection)     - S/P IR drain 1/6/20 with 18ml output  - Drop H&H podt IR s/p one u PRBC repeat H&H 8.3/25.1 overnight now this am H&H drop again 7.0/21.8 will add one unit PRBC with HD and repeat CBC Q6 hours  - Continue CBI per urology  - Plan for HD with no removal in the setting of soft SBP in the 90's  - Immuno: Belatacept (last d ose 12/28, Next dose 1/6 was held due to infection), Pred 5 (decreased from 10mg)  - MMF on hold 2/2 uptrending WBC  - b/l LE dopplers neg dvt  - PPx: bactrim/valcyte  - On fluconazole 200 mg QD hold nystatin   - on  SQ heparin 5000 units BID   - Strict I/Os  - Renal diet  - Bowel regimen  - SCDs , IS, encourage ambulation       ID: UTI/Infected Hematoma, cxs grew Enterobacter cloacae     - Drop in BP, increase in WBC to 39 today, lactate stat send is elevated 3.8  - Send procalcitonin, blood cultures x2, CXR, renal US  -ID consulted appreciate recs,- F/I IR drainage of abscess C&S    - Purulent drainage at incision site improving  - Fever this am -cont meropenem, s/p one dose vancomycin yesterday  - F/U with ID  - body fluid cult now pos Ecoli sensitivities pending,  - initial blood cultures neg   - Urine culture from 1/5/20 with GNR despite being on meropenem pending sensitivity  - Add one dose of amakacin x1 today, f/u cultures  - CT scan w/ oral contrast Abd/ pelvis- residual collection  - Cardiology consult for episode of wide complex tachycardia    HTN,  BP improved  - Will monitor BP when stable will resume Metoprolol 12.5 BID   - hold  hydralazine

## 2020-01-07 NOTE — PROGRESS NOTE ADULT - SUBJECTIVE AND OBJECTIVE BOX
Follow Up:  Infected Hematoma, Fever,    Interval History: Went for IR guided drainage fluid collection drainage on 1/6. Developed significant hematuria with clots after procedure. Febrile overnight. Notes significant bladder spasms. No N/V/D.     REVIEW OF SYSTEMS  [  ] ROS unobtainable because:    [x  ] All other systems negative except as noted below    Constitutional:  [x ] fever [ ] chills  [ ] weight loss  [ ] weakness  Skin:  [ ] rash [ ] phlebitis	  Eyes: [ ] icterus [ ] pain  [ ] discharge	  ENMT: [ ] sore throat  [ ] thrush [ ] ulcers [ ] exudates  Respiratory: [ ] dyspnea [ ] hemoptysis [ ] cough [ ] sputum	  Cardiovascular:  [ ] chest pain [ ] palpitations [ ] edema	  Gastrointestinal:  [ ] nausea [ ] vomiting [ ] diarrhea [ ] constipation [x ] pain	  Genitourinary:  [ ] dysuria [ ] frequency [ x] hematuria [ ] discharge [ ] flank pain  [ ] incontinence  Musculoskeletal:  [ ] myalgias [ ] arthralgias [ ] arthritis  [ ] back pain  Neurological:  [ ] headache [ ] seizures  [ ] confusion/altered mental status    Allergies  coconut (Anaphylaxis)  morphine (Pruritus; Rash)        ANTIMICROBIALS:  fluconAZOLE   Tablet 400 daily  meropenem  IVPB 500 every 24 hours  trimethoprim   80 mG/sulfamethoxazole 400 mG 1 daily  valGANciclovir 450 <User Schedule>      OTHER MEDS:  MEDICATIONS  (STANDING):  acetaminophen   Tablet .. 650 every 6 hours PRN  allopurinol 100 daily  famotidine    Tablet 20 daily  gabapentin 100 two times a day  HYDROmorphone  Injectable 0.25 every 4 hours PRN  oxybutynin 5 every 8 hours PRN  polyethylene glycol 3350 17 daily  predniSONE   Tablet 5 daily  senna 2 at bedtime  traMADol 50 every 6 hours PRN  traMADol 25 every 6 hours PRN      Vital Signs Last 24 Hrs  T(C): 36.5 (07 Jan 2020 17:19), Max: 37.5 (07 Jan 2020 07:00)  T(F): 97.7 (07 Jan 2020 17:19), Max: 99.5 (07 Jan 2020 07:00)  HR: 80 (07 Jan 2020 19:00) (67 - 102)  BP: 125/58 (07 Jan 2020 19:00) (92/52 - 174/97)  BP(mean): 84 (07 Jan 2020 19:00) (66 - 129)  RR: 17 (07 Jan 2020 19:00) (8 - 31)  SpO2: 79% (07 Jan 2020 19:00) (63% - 100%)    PHYSICAL EXAMINATION:  General: Alert and Awake, NAD  HEENT: PERRL, EOMI  Neck: Supple  Cardiac: RRR, No M/R/G  Resp: CTAB, No Wh/Rh/Ra  Abdomen: Surgical site in RLQ mucoid drainage. RLQ drain with mixed bloody/purulent drainage. mild-mod tenderness in RLQ. No rigidity.   MSK: No LE edema. No Calf tenderness  : No martinez  Skin: No rashes or lesions. Skin is warm and dry to the touch.   Neuro: Alert and Awake. CN 2-12 Grossly intact. Moves all four extremities spontaneously.  Psych: Calm, Pleasant, Cooperative                        8.6    31.61 )-----------( 404      ( 07 Jan 2020 18:26 )             25.4       01-07    132<L>  |  93<L>  |  43<H>  ----------------------------<  107<H>  4.3   |  20<L>  |  7.39<H>    Ca    8.3<L>      07 Jan 2020 00:39  Phos  6.6     01-07  Mg     2.0     01-07    TPro  6.2  /  Alb  2.7<L>  /  TBili  0.6  /  DBili  0.3<H>  /  AST  8<L>  /  ALT  5<L>  /  AlkPhos  194<H>  01-07          MICROBIOLOGY:    Vancomycin Level, Random: 12.0 ug/mL (01-07-20 @ 00:35)    Abdominal Fl Abdominal Fluid  01-06-20   No growth  --    No polymorphonuclear leukocytes seen  No organisms seen  by cytocentrifuge      .Urine Clean Catch (Midstream)  01-05-20   >100,000 CFU/ml Enterobacter cloacae  --  Enterobacter cloacae      .Blood Blood  01-04-20   No growth to date.  --  --      .Tissue Other  01-01-20   Numerous Enterobacter cloacae complex  --  Enterobacter cloacae complex      .Surgical Swab collection around right kidney  01-01-20   Moderate Enterobacter cloacae complex  --  Enterobacter cloacae complex      .Body Fluid periphrenic collection  01-01-20   Moderate Escherichia coli  Moderate Enterobacter cloacae (Carbapenem Resistant)  --  Escherichia coli  Enterobacter cloacae (Carbapenem Resistant)      .Surgical Swab right kidney abscess  01-01-20   Moderate Enterobacter cloacae complex  --  Enterobacter cloacae complex      .Urine Clean Catch (Midstream)  12-29-19   >100,000 CFU/ml Enterobacter cloacae  --  Enterobacter cloacae      .Blood Blood-Peripheral  12-27-19   No growth at 5 days.  --  --      .Stool Feces  12-14-19   No enteric pathogens isolated.  (Stool culture examined for Salmonella,  Shigella, Campylobacter, Aeromonas, Plesiomonas,  Vibrio, E.coli O157 and Yersinia)  --  --      .Stool moriah johnie  12-14-19   GI PCR Results: NOT detected  *******Please Note:*******  GI panel PCR evaluates for:  Campylobacter, Plesiomonas shigelloides, Salmonella,  Vibrio, Yersinia enterocolitica, Enteroaggregative  Escherichia coli (EAEC), Enteropathogenic E.coli (EPEC),  Enterotoxigenic E. coli (ETEC) lt/st, Shiga-like  toxin-producing E. coli (STEC) stx1/stx2,  Shigella/ Enteroinvasive E. coli (EIEC), Cryptosporidium,  Cyclospora cayetanensis, Entamoeba histolytica,  Giardia lamblia, Adenovirus F 40/41, Astrovirus,  Norovirus GI/GII, Rotavirus A, Sapovirus  --  --      .Nose Nose  12-10-19   Few Methicillin Sensitive Staph aureus "This can represent normal nasal  carriage.  PCR is more sensitive for identifying MRSA/MSSA carriage"  --  --    RADIOLOGY:    EXAM:  US KIDNEY TRANSPLANT W DOPP RT                        PROCEDURE DATE:  01/07/2020    Perinephric hematoma not significantly changed in size with decreased echogenicity likely related to liquefaction.  Peak systolic velocity at the transplant renal artery anastomosis has decreased from 444 cm to 281 cm/s since 12/31/2019. Continued follow-up is recommended.

## 2020-01-07 NOTE — PROGRESS NOTE ADULT - ATTENDING COMMENTS
Kidney recipient with ROYAL, ATN, DGF  Oliguric  Leukocytosis  Gram neg bacteriuria  S/p drainage of collection  Eventys noted, s/p PRBC after hematuria  Plan:  On minimised immunosuppression  Broad spectrum antimicrobial coverage  Hemodialysis , being managed by primary nephrologist, scheduled for today  Sonogram of allograft, bladder  will follow  I was present during and reviewed clinical and lab data as well as assessment and plan as documented by the housestaff as noted. Please contact if any additional questions with any change in clinical condition or on availability of any additional information or reports.

## 2020-01-07 NOTE — PROGRESS NOTE ADULT - PROBLEM SELECTOR PLAN 2
s/p belatacept 12/28/19  - Belatacept currently held d/t worsening leukocytosis and concern for infected perinephric fluid collection.  - discontinue MMF for concern of infected perinephric collection.  - continue prednisone to 5 mg daily   - continue ppx: Nystatin, bactrim, Valcyte renally dosed TIW, Pepcid

## 2020-01-07 NOTE — PROGRESS NOTE ADULT - SUBJECTIVE AND OBJECTIVE BOX
Transplant Surgery - Multidisciplinary Rounds  --------------------------------------------------------------  R DDRT    Date:  12/8/19 Dr. Espinal, readmitted 12/27/19 with +Flu/perinephretic collection  Ex Lap washout/ renal bx  Date:  1/1/20    Present:   Patient seen with multidisciplinary team including (Transplant Surgeon: Dr. Poe, Dr. Espinal. Transplant Nephrologist: Dr. Lowery, Dr. Puckett and renal fellow, pharmacist CAROL Tamez, NP: Gilbert Aviles. Transplant Coordinators: Sekou Ramirez) Bryson, Kendal Walsh, Bertha Martinez. Social Work: Callie Issa ([facility] SW/YOLANAD) and examined with Dr. Poe. Disciplines not in attendance will be notified of the plan.      HPI: 49M PMHx of ESRD on HD  ( via LUE AVF), HTN, Gout, Glaucoma, NET of pancreas (s/p robotic distal panc/splenectomy at [place] 2015 by Dr. Young, followed by Dr. Raphael, [facility] Oncologist), RA with hand contractures. 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 Post op course c/b DGF requiring HD, thrombocytopenia, elevated LDH and Haptoglobulin. Schistocytes  on peripheral smear concerning for TMA. Envarsus held. Received Belatacept 12/13. Started on PLEX (12/12, 12/15). Underwent renal bx on 12/13 c/w profound ATN.  Found to have much improvement to levels, likely related to Tacrolimus. He was discharged home on 12/20/19 w/ outpatient HD.     Re-admitted 12/27 with influenza, completed treatement with Tamiflu on 12/31. Urine cx on admission grew Ent cloacae, was started on meropenem 12/31.   Renal US on admission with increasing hematoma. OR on 1/1 for ex-lap, hematoma evacuation, washout and renal bx. OR cxs growing Ent. clocae.  Prelim bx results c/w ATN, no rejection.     cxs:   1/6-IR drain cx pending result  1/1 OR cx - Ent Clocae  1/1 OR fluid - GNR Ecoli   12/29 Urine - Ent Clocae  12/27 Bld - Neg    Interval events:  POD 6  s/p ex-lap/washout Renal bx  - Was upgraded to SICU Sunday for elevated WBC, abd pain, temp, and hypotension   - S/P IR drain of collection with 18ml clear output  - Developed hematuria post IR with 700ml juan red blood, urology placed 3 way catheter and started CBI this am  - H&H dropped overnight received one u PRBC with improved H&H   - Tmax over 24 hours 38.2 repeat blood cx pending results  - WBC up to 39 from 25 - MMF held  - HD Saturday 2 L removed     Potential Discharge date: pending clinical improvement          MEDICATIONS  (STANDING):  allopurinol 100 milliGRAM(s) Oral daily  brimonidine 0.2% Ophthalmic Solution 1 Drop(s) Both EYES three times a day  chlorhexidine 2% Cloths 1 Application(s) Topical <User Schedule>  dextrose 5% + sodium chloride 0.9%. 1000 milliLiter(s) (50 mL/Hr) IV Continuous <Continuous>  famotidine    Tablet 20 milliGRAM(s) Oral daily  fluconAZOLE   Tablet 400 milliGRAM(s) Oral daily  gabapentin 100 milliGRAM(s) Oral two times a day  latanoprost 0.005% Ophthalmic Solution 1 Drop(s) Both EYES daily  meropenem  IVPB 500 milliGRAM(s) IV Intermittent every 24 hours  polyethylene glycol 3350 17 Gram(s) Oral daily  predniSONE   Tablet 5 milliGRAM(s) Oral daily  senna 2 Tablet(s) Oral at bedtime  trimethoprim   80 mG/sulfamethoxazole 400 mG 1 Tablet(s) Oral daily  valGANciclovir 450 milliGRAM(s) Oral <User Schedule>    MEDICATIONS  (PRN):  acetaminophen   Tablet .. 650 milliGRAM(s) Oral every 6 hours PRN Mild Pain (1 - 3)  HYDROmorphone  Injectable 0.25 milliGRAM(s) IV Push every 4 hours PRN Breakthrough pain  lidocaine 2% Jelly 1 milliLiter(s) IntraUrethral two times a day PRN irritation  oxybutynin 5 milliGRAM(s) Oral every 8 hours PRN Bladder spasms  traMADol 50 milliGRAM(s) Oral every 6 hours PRN Severe Pain (7 - 10)  traMADol 25 milliGRAM(s) Oral every 6 hours PRN Moderate Pain (4 - 6)      PAST MEDICAL & SURGICAL HISTORY:  Erectile dysfunction  Glaucoma  Gout  HTN (hypertension)  ESRD (end stage renal disease) on dialysis: since 7/2013 otisjarrett campbell sat  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  Carpal tunnel syndrome  Brain cyst: had MRI recently- now gone  Anemia  Gastric ulcer: Long time ago  Rheumatoid arthritis  Renal transplant recipient  Breakdown (mechanical) of penile (implanted) prosthesis, sequela  End-stage renal disease (ESRD): PERMACATH PLACEMENT  Abscess of left buttock: s/p I &amp; D  AV fistula: placement left lower arm  S/P cystoscopy: stent placement &amp; removal for kidney stones, lithiotripsy  s/p Lt Carpal tunnel: 2011      Vital Signs Last 24 Hrs  T(C): 37.5 (07 Jan 2020 07:00), Max: 37.5 (07 Jan 2020 07:00)  T(F): 99.5 (07 Jan 2020 07:00), Max: 99.5 (07 Jan 2020 07:00)  HR: 91 (07 Jan 2020 09:00) (60 - 102)  BP: 98/60 (07 Jan 2020 09:00) (92/52 - 170/79)  BP(mean): 74 (07 Jan 2020 09:00) (66 - 119)  RR: 19 (07 Jan 2020 09:00) (12 - 32)  SpO2: 100% (07 Jan 2020 09:00) (83% - 100%)    I&O's Summary    06 Jan 2020 07:01  -  07 Jan 2020 07:00  --------------------------------------------------------  IN: 670 mL / OUT: 1025 mL / NET: -355 mL    07 Jan 2020 07:01  -  07 Jan 2020 11:38  --------------------------------------------------------  IN: 6080 mL / OUT: 0 mL / NET: 6080 mL                              7.0    46.14 )-----------( 503      ( 07 Jan 2020 09:54 )             21.8     01-07    132<L>  |  93<L>  |  43<H>  ----------------------------<  107<H>  4.3   |  20<L>  |  7.39<H>    Ca    8.3<L>      07 Jan 2020 00:39  Phos  6.6     01-07  Mg     2.0     01-07    TPro  6.2  /  Alb  2.7<L>  /  TBili  0.6  /  DBili  0.3<H>  /  AST  8<L>  /  ALT  5<L>  /  AlkPhos  194<H>  01-07          Culture - Body Fluid with Gram Stain (collected 01-06-20 @ 21:03)  Source: Abdominal Fl Abdominal Fluid  Gram Stain (01-06-20 @ 23:41):    No polymorphonuclear leukocytes seen    No organisms seen    by cytocentrifuge    Culture - Other (collected 01-05-20 @ 13:59)  Source: Skin from incision  Final Report (01-07-20 @ 11:15):    No growth at 48 hours    Culture - Urine (collected 01-05-20 @ 13:59)  Source: .Urine Clean Catch (Midstream)  Preliminary Report (01-06-20 @ 18:09):    >100,000 CFU/ml Gram Negative Rods    Culture - Blood (collected 01-04-20 @ 13:07)  Source: .Blood Blood  Preliminary Report (01-05-20 @ 14:01):    No growth to date.    Culture - Blood (collected 01-04-20 @ 13:07)  Source: .Blood Blood  Preliminary Report (01-05-20 @ 14:01):    No growth to date.    Culture - Tissue with Gram Stain (collected 01-01-20 @ 17:12)  Source: .Tissue Other  Gram Stain (01-02-20 @ 00:25):    Rare polymorphonuclear leukocytes seen per low power field    Rare Gram Negative Rods seen per oil power field  Final Report (01-06-20 @ 07:47):    Numerous Enterobacter cloacae complex  Organism: Enterobacter cloacae complex (01-06-20 @ 07:47)  Organism: Enterobacter cloacae complex (01-06-20 @ 07:47)    Culture - Surgical Swab (collected 01-01-20 @ 17:06)  Source: .Surgical Swab collection around right kidney  Final Report (01-06-20 @ 07:45):    Moderate Enterobacter cloacae complex  Organism: Enterobacter cloacae complex (01-06-20 @ 15:11)  Organism: Enterobacter cloacae complex (01-06-20 @ 07:45)    Culture - Body Fluid with Gram Stain (collected 01-01-20 @ 16:59)  Source: .Body Fluid periphrenic collection  Gram Stain (01-02-20 @ 09:22):    Upon re-evaluation of gram stain: Rare Gram Negative Rods seen    No polymorphonuclear cells seen    by cytocentrifuge  Final Report (01-06-20 @ 13:03):    Moderate Escherichia coli    Moderate Enterobacter cloacae (Carbapenem Resistant)  Organism: Escherichia coli  Enterobacter cloacae (Carbapenem Resistant) (01-06-20 @ 08:44)  Organism: Enterobacter cloacae (Carbapenem Resistant) (01-06-20 @ 08:44)  Organism: Escherichia coli (01-06-20 @ 08:44)    Culture - Surgical Swab (collected 01-01-20 @ 16:51)  Source: .Surgical Swab right kidney abscess  Final Report (01-06-20 @ 07:45):    Moderate Enterobacter cloacae complex  Organism: Enterobacter cloacae complex (01-06-20 @ 07:45)  Organism: Enterobacter cloacae complex (01-06-20 @ 07:45)        REVIEW OF SYSTEMS  Gen: No weight changes, fatigue, fevers/chills, weakness  Skin: No rashes  Head/Eyes/Ears/Mouth: No headache; Normal hearing; Normal vision w/o blurriness; No sinus pain/discomfort, sore throat  Respiratory: No dyspnea, cough, wheezing, hemoptysis  CV: No chest pain, PND, orthopnea  GI: c/o incisional pain No diarrhea, constipation, nausea, vomiting, melena, hematochezia  : No increased frequency, dysuria, hematuria, nocturia  MSK: No joint pain/swelling; no back pain; no edema  Neuro: No dizziness/lightheadedness, weakness, seizures, numbness, tingling  Heme: No easy bruising or bleeding  Endo: No heat/cold intolerance  Psych: No significant nervousness, anxiety, stress, depression      PHYSICAL EXAM:    Constitutional: Well developed / well nourished  	Eyes: Anicteric, PERRLA  	ENMT: nc/at   	Neck: Supple  	Respiratory: CTA     	Cardiovascular: RRR  	Gastrointestinal: Soft abdomen, tender to palpation ND. incision w/ serosanguinous drainage, BO w/ minimal drainage    Genitourinary:  Kennedy 3way in place (placed by urology) - CBI in progress    Extremities: SCD's in place and working bilaterally  	Vascular: faintly palpable pulses b/l LE; 3+ pitting edema. R > L   	Neurological: A&O x3  	Skin: moderate strike through on the dressing, BO serosanguinous   	Musculoskeletal: Moving all extremities    Psychiatric: Responsive

## 2020-01-07 NOTE — PROGRESS NOTE ADULT - SUBJECTIVE AND OBJECTIVE BOX
[facility] DIVISION OF KIDNEY DISEASES AND HYPERTENSION -- FOLLOW UP NOTE  --------------------------------------------------------------------------------  HPI: 48 yo M with hx of ESRD on HD, s/p DDRT on 12/8, elevated SCr on HD using AVF, complicated by TMA related to CNI, now on belatacept, admitted with influenza. Pt s/p washout of infected collection and biopsy (1/1/20) which revealed ATN. Last HD treatment completed on 1/4/20. Pt. transferred to SICU for hemodynamic monitoring in the setting of fever, tachyarrhythmia, and CT imaging showing diffusely enlarged perinephric collection. Pt. with UOP in past 24 hours of ~200 cc. Pt. underwent CT guided drainage of perinephric fluid collection with IR team yesterday, with culture showing no growth. Leukocytosis worsened this AM. Pt. also evaluated for hematuria by urology team with Kennedy catheter placed.    Pt. seen and examined while sitting up in chair this AM in SICU. Pt. states that he has some pain when he tries to urinate with Kennedy catheter in. Otherwise complains of not being able to sleep. Denies CP, SOB, N/V/F/C.  PAST HISTORY  --------------------------------------------------------------------------------  No significant changes to PMH, PSH, FHx, SHx, unless otherwise noted    ALLERGIES & MEDICATIONS  --------------------------------------------------------------------------------  Allergies    coconut (Anaphylaxis)  morphine (Pruritus; Rash)    Intolerances    Standing Inpatient Medications  allopurinol 100 milliGRAM(s) Oral daily  brimonidine 0.2% Ophthalmic Solution 1 Drop(s) Both EYES three times a day  chlorhexidine 2% Cloths 1 Application(s) Topical <User Schedule>  dextrose 5% + sodium chloride 0.9%. 1000 milliLiter(s) IV Continuous <Continuous>  famotidine    Tablet 20 milliGRAM(s) Oral daily  fluconAZOLE   Tablet 400 milliGRAM(s) Oral daily  gabapentin 100 milliGRAM(s) Oral two times a day  latanoprost 0.005% Ophthalmic Solution 1 Drop(s) Both EYES daily  meropenem  IVPB 500 milliGRAM(s) IV Intermittent every 24 hours  polyethylene glycol 3350 17 Gram(s) Oral daily  predniSONE   Tablet 5 milliGRAM(s) Oral daily  senna 2 Tablet(s) Oral at bedtime  trimethoprim   80 mG/sulfamethoxazole 400 mG 1 Tablet(s) Oral daily  valGANciclovir 450 milliGRAM(s) Oral <User Schedule>    REVIEW OF SYSTEMS  --------------------------------------------------------------------------------  Gen: No fatigue, fevers/chills, weakness  Skin: No rashes  Head/Eyes/Ears/Mouth: No headache; No sore throat  Respiratory: No dyspnea, cough,   CV: No chest pain, PND, orthopnea  GI: No abdominal pain  Transplant: + pain by incision   : + hematuria    All other systems were reviewed and are negative, except as noted.  VITALS/PHYSICAL EXAM  --------------------------------------------------------------------------------  T(C): 36.6 (01-07-20 @ 03:00), Max: 38.2 (01-06-20 @ 07:00)  HR: 97 (01-07-20 @ 06:00) (60 - 102)  BP: 92/52 (01-07-20 @ 06:00) (92/52 - 170/79)  RR: 17 (01-07-20 @ 06:00) (12 - 39)  SpO2: 96% (01-07-20 @ 06:00) (83% - 100%)  Wt(kg): --    01-05-20 @ 07:01  -  01-06-20 @ 07:00  --------------------------------------------------------  IN: 1235 mL / OUT: 230 mL / NET: 1005 mL    01-06-20 @ 07:01  -  01-07-20 @ 06:31  --------------------------------------------------------  IN: 640 mL / OUT: 950 mL / NET: -310 mL    Physical Exam:  	Gen: NAD  	HEENT: Supple neck  	Pulm: CTA B/L  	CV: RRR, S1S2; no rub  	Abd: +BS, soft, nontender/nondistended                      Transplant: incision c/d/i  	: + Kennedy with bloody urine  	LE: +1-2 b/l pitting ankle edema  	Neuro: No focal deficits  	Psych: Normal affect and mood  	Skin: Warm, without rashes  LABS/STUDIES  --------------------------------------------------------------------------------              8.3    39.44 >-----------<  498      [01-07-20 @ 04:29]              25.1     132  |  93  |  43  ----------------------------<  107      [01-07-20 @ 00:39]  4.3   |  20  |  7.39        Ca     8.3     [01-07-20 @ 00:39]      Mg     2.0     [01-07-20 @ 00:39]      Phos  6.6     [01-07-20 @ 00:39]    Creatinine Trend:  SCr 7.39 [01-07 @ 00:39]  SCr 7.06 [01-06 @ 20:45]  SCr 6.37 [01-06 @ 04:55]  SCr 4.38 [01-05 @ 06:32]  SCr 6.86 [01-04 @ 21:16] [facility] DIVISION OF KIDNEY DISEASES AND HYPERTENSION -- FOLLOW UP NOTE  --------------------------------------------------------------------------------  HPI: 48 yo M with hx of ESRD on HD, s/p DDRT on 12/8, elevated SCr on HD using AVF, complicated by TMA related to CNI, now on belatacept, admitted with influenza. Pt s/p washout of infected collection and biopsy (1/1/20) which revealed ATN. Last HD treatment completed on 1/4/20. Pt. transferred to SICU for hemodynamic monitoring in the setting of fever, tachyarrhythmia, and CT imaging showing diffusely enlarged perinephric collection. Pt. with UOP in past 24 hours of ~200 cc. Pt. underwent CT guided drainage of perinephric fluid collection with IR team yesterday, with culture showing no growth. Leukocytosis worsened this AM. Pt. also evaluated for hematuria by urology team with Kennedy catheter placed and considering initiation of CBI. Pt. received 1 unit of PRBC yesterday.    Pt. seen and examined while sitting up in chair this AM in SICU. Pt. states that he has some pain when he tries to urinate with Kennedy catheter in. Otherwise complains of not being able to sleep. Denies CP, SOB, N/V/F/C.  PAST HISTORY  --------------------------------------------------------------------------------  No significant changes to PMH, PSH, FHx, SHx, unless otherwise noted    ALLERGIES & MEDICATIONS  --------------------------------------------------------------------------------  Allergies    coconut (Anaphylaxis)  morphine (Pruritus; Rash)    Intolerances    Standing Inpatient Medications  allopurinol 100 milliGRAM(s) Oral daily  brimonidine 0.2% Ophthalmic Solution 1 Drop(s) Both EYES three times a day  chlorhexidine 2% Cloths 1 Application(s) Topical <User Schedule>  dextrose 5% + sodium chloride 0.9%. 1000 milliLiter(s) IV Continuous <Continuous>  famotidine    Tablet 20 milliGRAM(s) Oral daily  fluconAZOLE   Tablet 400 milliGRAM(s) Oral daily  gabapentin 100 milliGRAM(s) Oral two times a day  latanoprost 0.005% Ophthalmic Solution 1 Drop(s) Both EYES daily  meropenem  IVPB 500 milliGRAM(s) IV Intermittent every 24 hours  polyethylene glycol 3350 17 Gram(s) Oral daily  predniSONE   Tablet 5 milliGRAM(s) Oral daily  senna 2 Tablet(s) Oral at bedtime  trimethoprim   80 mG/sulfamethoxazole 400 mG 1 Tablet(s) Oral daily  valGANciclovir 450 milliGRAM(s) Oral <User Schedule>    REVIEW OF SYSTEMS  --------------------------------------------------------------------------------  Gen: No fatigue, fevers/chills, weakness  Skin: No rashes  Head/Eyes/Ears/Mouth: No headache; No sore throat  Respiratory: No dyspnea, cough,   CV: No chest pain, PND, orthopnea  GI: No abdominal pain  Transplant: + pain by incision   : + hematuria    All other systems were reviewed and are negative, except as noted.  VITALS/PHYSICAL EXAM  --------------------------------------------------------------------------------  T(C): 36.6 (01-07-20 @ 03:00), Max: 38.2 (01-06-20 @ 07:00)  HR: 97 (01-07-20 @ 06:00) (60 - 102)  BP: 92/52 (01-07-20 @ 06:00) (92/52 - 170/79)  RR: 17 (01-07-20 @ 06:00) (12 - 39)  SpO2: 96% (01-07-20 @ 06:00) (83% - 100%)  Wt(kg): --    01-05-20 @ 07:01  -  01-06-20 @ 07:00  --------------------------------------------------------  IN: 1235 mL / OUT: 230 mL / NET: 1005 mL    01-06-20 @ 07:01  -  01-07-20 @ 06:31  --------------------------------------------------------  IN: 640 mL / OUT: 950 mL / NET: -310 mL    Physical Exam:  	Gen: NAD  	HEENT: Supple neck  	Pulm: CTA B/L  	CV: RRR, S1S2; no rub  	Abd: +BS, soft, nontender/nondistended                      Transplant: incision c/d/i  	: + Marcia with bloody urine  	LE: +1-2 b/l pitting ankle edema  	Neuro: No focal deficits  	Psych: Normal affect and mood  	Skin: Warm, without rashes  LABS/STUDIES  --------------------------------------------------------------------------------              8.3    39.44 >-----------<  498      [01-07-20 @ 04:29]              25.1     132  |  93  |  43  ----------------------------<  107      [01-07-20 @ 00:39]  4.3   |  20  |  7.39        Ca     8.3     [01-07-20 @ 00:39]      Mg     2.0     [01-07-20 @ 00:39]      Phos  6.6     [01-07-20 @ 00:39]    Creatinine Trend:  SCr 7.39 [01-07 @ 00:39]  SCr 7.06 [01-06 @ 20:45]  SCr 6.37 [01-06 @ 04:55]  SCr 4.38 [01-05 @ 06:32]  SCr 6.86 [01-04 @ 21:16]

## 2020-01-08 LAB
ALBUMIN SERPL ELPH-MCNC: 2.4 G/DL — LOW (ref 3.3–5)
ALP SERPL-CCNC: 184 U/L — HIGH (ref 40–120)
ALT FLD-CCNC: 6 U/L — LOW (ref 10–45)
AMIKACIN TROUGH SERPL-MCNC: 6.3 UG/ML — HIGH
ANION GAP SERPL CALC-SCNC: 17 MMOL/L — SIGNIFICANT CHANGE UP (ref 5–17)
APTT BLD: 26.8 SEC — LOW (ref 27.5–36.3)
APTT BLD: 47.4 SEC — HIGH (ref 27.5–36.3)
AST SERPL-CCNC: 21 U/L — SIGNIFICANT CHANGE UP (ref 10–40)
BILIRUB DIRECT SERPL-MCNC: 0.4 MG/DL — HIGH (ref 0–0.2)
BILIRUB INDIRECT FLD-MCNC: 0.6 MG/DL — SIGNIFICANT CHANGE UP (ref 0.2–1)
BILIRUB SERPL-MCNC: 1 MG/DL — SIGNIFICANT CHANGE UP (ref 0.2–1.2)
BUN SERPL-MCNC: 26 MG/DL — HIGH (ref 7–23)
CALCIUM SERPL-MCNC: 8.4 MG/DL — SIGNIFICANT CHANGE UP (ref 8.4–10.5)
CHLORIDE SERPL-SCNC: 94 MMOL/L — LOW (ref 96–108)
CO2 SERPL-SCNC: 24 MMOL/L — SIGNIFICANT CHANGE UP (ref 22–31)
CREAT SERPL-MCNC: 5.13 MG/DL — HIGH (ref 0.5–1.3)
FUNGITELL: 37 PG/ML — SIGNIFICANT CHANGE UP
GAS PNL BLDV: SIGNIFICANT CHANGE UP
GLUCOSE SERPL-MCNC: 95 MG/DL — SIGNIFICANT CHANGE UP (ref 70–99)
HCT VFR BLD CALC: 25.3 % — LOW (ref 39–50)
HCT VFR BLD CALC: 25.5 % — LOW (ref 39–50)
HCT VFR BLD CALC: 25.9 % — LOW (ref 39–50)
HGB BLD-MCNC: 8.5 G/DL — LOW (ref 13–17)
HGB BLD-MCNC: 9 G/DL — LOW (ref 13–17)
HGB BLD-MCNC: 9.3 G/DL — LOW (ref 13–17)
INR BLD: 1.3 RATIO — HIGH (ref 0.88–1.16)
MAGNESIUM SERPL-MCNC: 1.9 MG/DL — SIGNIFICANT CHANGE UP (ref 1.6–2.6)
MCHC RBC-ENTMCNC: 30.6 PG — SIGNIFICANT CHANGE UP (ref 27–34)
MCHC RBC-ENTMCNC: 31.5 PG — SIGNIFICANT CHANGE UP (ref 27–34)
MCHC RBC-ENTMCNC: 31.6 PG — SIGNIFICANT CHANGE UP (ref 27–34)
MCHC RBC-ENTMCNC: 33.6 GM/DL — SIGNIFICANT CHANGE UP (ref 32–36)
MCHC RBC-ENTMCNC: 35.3 GM/DL — SIGNIFICANT CHANGE UP (ref 32–36)
MCHC RBC-ENTMCNC: 35.9 GM/DL — SIGNIFICANT CHANGE UP (ref 32–36)
MCV RBC AUTO: 87.8 FL — SIGNIFICANT CHANGE UP (ref 80–100)
MCV RBC AUTO: 89.5 FL — SIGNIFICANT CHANGE UP (ref 80–100)
MCV RBC AUTO: 91 FL — SIGNIFICANT CHANGE UP (ref 80–100)
NRBC # BLD: 0 /100 WBCS — SIGNIFICANT CHANGE UP (ref 0–0)
PHOSPHATE SERPL-MCNC: 4.5 MG/DL — SIGNIFICANT CHANGE UP (ref 2.5–4.5)
PLATELET # BLD AUTO: 388 K/UL — SIGNIFICANT CHANGE UP (ref 150–400)
PLATELET # BLD AUTO: 409 K/UL — HIGH (ref 150–400)
PLATELET # BLD AUTO: 504 K/UL — HIGH (ref 150–400)
POTASSIUM SERPL-MCNC: 4.7 MMOL/L — SIGNIFICANT CHANGE UP (ref 3.5–5.3)
POTASSIUM SERPL-SCNC: 4.7 MMOL/L — SIGNIFICANT CHANGE UP (ref 3.5–5.3)
PROT SERPL-MCNC: 5.9 G/DL — LOW (ref 6–8.3)
PROTHROM AB SERPL-ACNC: 14.9 SEC — HIGH (ref 10–12.9)
RBC # BLD: 2.78 M/UL — LOW (ref 4.2–5.8)
RBC # BLD: 2.85 M/UL — LOW (ref 4.2–5.8)
RBC # BLD: 2.95 M/UL — LOW (ref 4.2–5.8)
RBC # FLD: 15.8 % — HIGH (ref 10.3–14.5)
RBC # FLD: 15.9 % — HIGH (ref 10.3–14.5)
RBC # FLD: 16.3 % — HIGH (ref 10.3–14.5)
SODIUM SERPL-SCNC: 135 MMOL/L — SIGNIFICANT CHANGE UP (ref 135–145)
VANCOMYCIN FLD-MCNC: 8.9 UG/ML — SIGNIFICANT CHANGE UP
WBC # BLD: 29.86 K/UL — HIGH (ref 3.8–10.5)
WBC # BLD: 32.69 K/UL — HIGH (ref 3.8–10.5)
WBC # BLD: 33.22 K/UL — HIGH (ref 3.8–10.5)
WBC # FLD AUTO: 29.86 K/UL — HIGH (ref 3.8–10.5)
WBC # FLD AUTO: 32.69 K/UL — HIGH (ref 3.8–10.5)
WBC # FLD AUTO: 33.22 K/UL — HIGH (ref 3.8–10.5)

## 2020-01-08 PROCEDURE — 99232 SBSQ HOSP IP/OBS MODERATE 35: CPT | Mod: GC

## 2020-01-08 PROCEDURE — 99233 SBSQ HOSP IP/OBS HIGH 50: CPT

## 2020-01-08 PROCEDURE — 99232 SBSQ HOSP IP/OBS MODERATE 35: CPT

## 2020-01-08 PROCEDURE — 93971 EXTREMITY STUDY: CPT | Mod: 26

## 2020-01-08 PROCEDURE — 99231 SBSQ HOSP IP/OBS SF/LOW 25: CPT

## 2020-01-08 RX ORDER — HEPARIN SODIUM 5000 [USP'U]/ML
1000 INJECTION INTRAVENOUS; SUBCUTANEOUS
Qty: 25000 | Refills: 0 | Status: DISCONTINUED | OUTPATIENT
Start: 2020-01-08 | End: 2020-01-09

## 2020-01-08 RX ORDER — MAGNESIUM SULFATE 500 MG/ML
2 VIAL (ML) INJECTION ONCE
Refills: 0 | Status: COMPLETED | OUTPATIENT
Start: 2020-01-08 | End: 2020-01-08

## 2020-01-08 RX ORDER — VANCOMYCIN HCL 1 G
1000 VIAL (EA) INTRAVENOUS ONCE
Refills: 0 | Status: COMPLETED | OUTPATIENT
Start: 2020-01-08 | End: 2020-01-08

## 2020-01-08 RX ORDER — AMIKACIN SULFATE 250 MG/ML
240 INJECTION, SOLUTION INTRAMUSCULAR; INTRAVENOUS ONCE
Refills: 0 | Status: COMPLETED | OUTPATIENT
Start: 2020-01-08 | End: 2020-01-08

## 2020-01-08 RX ADMIN — TRAMADOL HYDROCHLORIDE 50 MILLIGRAM(S): 50 TABLET ORAL at 14:46

## 2020-01-08 RX ADMIN — GABAPENTIN 100 MILLIGRAM(S): 400 CAPSULE ORAL at 17:33

## 2020-01-08 RX ADMIN — Medication 650 MILLIGRAM(S): at 18:45

## 2020-01-08 RX ADMIN — CHLORHEXIDINE GLUCONATE 1 APPLICATION(S): 213 SOLUTION TOPICAL at 05:57

## 2020-01-08 RX ADMIN — FAMOTIDINE 20 MILLIGRAM(S): 10 INJECTION INTRAVENOUS at 11:35

## 2020-01-08 RX ADMIN — TRAMADOL HYDROCHLORIDE 50 MILLIGRAM(S): 50 TABLET ORAL at 20:20

## 2020-01-08 RX ADMIN — Medication 10 MILLIGRAM(S): at 21:08

## 2020-01-08 RX ADMIN — TRAMADOL HYDROCHLORIDE 50 MILLIGRAM(S): 50 TABLET ORAL at 21:00

## 2020-01-08 RX ADMIN — VALGANCICLOVIR 450 MILLIGRAM(S): 450 TABLET, FILM COATED ORAL at 05:57

## 2020-01-08 RX ADMIN — AMIKACIN SULFATE 100.96 MILLIGRAM(S): 250 INJECTION, SOLUTION INTRAMUSCULAR; INTRAVENOUS at 14:12

## 2020-01-08 RX ADMIN — Medication 50 GRAM(S): at 01:17

## 2020-01-08 RX ADMIN — Medication 1 MILLILITER(S): at 18:35

## 2020-01-08 RX ADMIN — BRIMONIDINE TARTRATE 1 DROP(S): 2 SOLUTION/ DROPS OPHTHALMIC at 14:13

## 2020-01-08 RX ADMIN — BRIMONIDINE TARTRATE 1 DROP(S): 2 SOLUTION/ DROPS OPHTHALMIC at 05:58

## 2020-01-08 RX ADMIN — BRIMONIDINE TARTRATE 1 DROP(S): 2 SOLUTION/ DROPS OPHTHALMIC at 21:08

## 2020-01-08 RX ADMIN — Medication 100 MILLIGRAM(S): at 11:35

## 2020-01-08 RX ADMIN — Medication 5 MILLIGRAM(S): at 05:57

## 2020-01-08 RX ADMIN — HEPARIN SODIUM 10 UNIT(S)/HR: 5000 INJECTION INTRAVENOUS; SUBCUTANEOUS at 15:59

## 2020-01-08 RX ADMIN — Medication 10 MILLIGRAM(S): at 14:12

## 2020-01-08 RX ADMIN — Medication 1 TABLET(S): at 11:35

## 2020-01-08 RX ADMIN — TRAMADOL HYDROCHLORIDE 50 MILLIGRAM(S): 50 TABLET ORAL at 05:59

## 2020-01-08 RX ADMIN — MEROPENEM 100 MILLIGRAM(S): 1 INJECTION INTRAVENOUS at 21:08

## 2020-01-08 RX ADMIN — TRAMADOL HYDROCHLORIDE 50 MILLIGRAM(S): 50 TABLET ORAL at 14:16

## 2020-01-08 RX ADMIN — Medication 250 MILLIGRAM(S): at 01:17

## 2020-01-08 RX ADMIN — Medication 10 MILLIGRAM(S): at 05:58

## 2020-01-08 RX ADMIN — TRAMADOL HYDROCHLORIDE 50 MILLIGRAM(S): 50 TABLET ORAL at 06:30

## 2020-01-08 RX ADMIN — LATANOPROST 1 DROP(S): 0.05 SOLUTION/ DROPS OPHTHALMIC; TOPICAL at 14:54

## 2020-01-08 RX ADMIN — SENNA PLUS 2 TABLET(S): 8.6 TABLET ORAL at 21:09

## 2020-01-08 RX ADMIN — Medication 650 MILLIGRAM(S): at 18:15

## 2020-01-08 RX ADMIN — FLUCONAZOLE 400 MILLIGRAM(S): 150 TABLET ORAL at 11:35

## 2020-01-08 RX ADMIN — GABAPENTIN 100 MILLIGRAM(S): 400 CAPSULE ORAL at 05:57

## 2020-01-08 NOTE — PROGRESS NOTE ADULT - ASSESSMENT
49M PMHx of ESRD on HD  ( via LUE AVF), HTN, Gout, Glaucoma, NET of pancreas (s/p robotic distal panc/splenectomy at [place] 2015 by Dr. Young, followed by Dr. Raphael, [facility] Oncologist), RA with hand contractures. 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 Post op course c/b DGF requiring HD, thrombocytopenia, elevated LDH and Haptoglobulin. Schistocytes  on peripheral smear concerning for TMA. Envarsus held. Received Belatacept 12/13. Started on PLEX (12/12, 12/15). Underwent renal bx on 12/13 c/w profound ATN. Re-admitted with Influenza,  s/p hematoma evacuation/ abd washout and renal biopsy now with leukocytosis and fevers.       Influenza  - RVP + influenza A  - Completed Tamiflu      s/p DDRT c/b DGF/ATN   - US on admission with increased perinephric fluid collection  - POD 7 s/p ex lap/washout and renal txp bx (prelim c/w ATN, no rejection)   - S/P IR drain 1/6/20 with 18ml output  - Drop H&H post IR s/p 3 u PRBC with stable H&H this am 8.5/25.3  - Continue CBI per urology  - S/P HD with no removal yesterday  - Immuno: Belatacept (last d ose 12/28, Next dose 1/6 was held due to infection), Pred 5 (decreased from 10mg)  - MMF on hold 2/2 uptrending WBC  - b/l LE dopplers neg dvt  - PPx: bactrim/valcyte  - On fluconazole 200 mg QD hold nystatin   - on  SQ heparin 5000 units BID   - Strict I/Os  - Renal diet  - Bowel regimen  - SCDs , IS, encourage ambulation       ID: UTI/Infected Hematoma, cxs grew Enterobacter cloacae     - BP remains fost in the 90's, WBC trending down 29 today, lactate trended down to   - Send procalcitonin, blood cultures x2, CXR, renal US  -ID consulted appreciate recs,- F/I IR drainage of abscess C&S    - Purulent drainage at incision site improving  - Received one dose of Amakacin yesterday for elevated WBC of 39, now improved will continue amakacin QD  - body fluid cult now pos Ecoli sensitivities pending,  - initial blood cultures neg   - Urine culture from 1/5/20 with GNR despite being on meropenem pending sensitivity  - CT scan w/ oral contrast Abd/ pelvis- residual collection  - Cardiology consult for episode of wide complex tachycardia  - RLE edema will get dopplers to r/o DVT  - Repeat CBC at 12pm     HTN,  BP improved  - Will monitor BP when stable will resume Metoprolol 12.5 BID   - hold  hydralazine

## 2020-01-08 NOTE — PROGRESS NOTE ADULT - ATTENDING COMMENTS
Kidney recipient with persistent DGF on hemodialysis support.  Anemia, hematuria, s/p PRBC  Reviewed clinical, lab and imaging data  Reviewed imaging findings in team rounds  Cultures noted  Antimicrobial coverage and immunosuppression reviewed.  On minimized immunosuppression   Plan  Monitor hemoglobin/Hct  Continue steroids  Will follow  Primary nephrologist team managing hemodialysis

## 2020-01-08 NOTE — PROGRESS NOTE ADULT - ASSESSMENT
Mr. Prado is a 49 year old male with ESRD on HD, now s/p ddrt on 12/8/2019 course complicated by TMA/profound ATN, who presents from a HD center with a fever and cough. He was initially admitted with influenza. He is s/p washout of infected collection and biopsy which revealed ATN.   He is now found to have a diffusely enlarging perinephric fluid collection and is awaiting drainage.    While on telemetry, he had an episode of a wide complex tachycardia. It initially appears irregular, suggesting an atrial arrhythmia with aberrancy, though the remainder appears more like an episode of non-sustained VT.      - continue to watch on telemetry  - Appears to had been in AF last night on tele. Now in SR.   - if tolerated by his BP, would restart his lopressor at 12.5 mg po bid.  As of now, his BP remains too borderline to start this. If needed can try Lopressor 2.5mg IV if HR increases or will have to consider Amio.   - watch creatinine and electrolytes. Keep K>4, Mg>2  - check echocardiogram. Last echocardiogram in 1/2019 with normal LV function      - no sign of acute ischemia. He had a pharm stress test in 2018 that showed no evidence of ischemia  - s/p IR drainage.  Tolerated procedure well.  - will follow with you

## 2020-01-08 NOTE — PROGRESS NOTE ADULT - SUBJECTIVE AND OBJECTIVE BOX
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 Pt s/p washout of infected collection and biopsy (1/1/20) which revealed ATN. Last HD treatment completed on 1/4/20. Pt. transferred to SICU for hemodynamic monitoring in the setting of fever, tachyarrhythmia, and CT imaging showing diffusely enlarged perinephric collection. Pt. with UOP in past 24 hours of ~200 cc. Pt. underwent CT guided drainage of perinephric fluid collection with IR team yesterday, with culture showing no growth. Leukocytosis worsened this AM. Pt. also evaluated for hematuria by urology team with Kennedy catheter placed and initiated on CBI. Pt. received blood transfusions and HD treatment yesterday. Pt. started on amikacin yesterday, now with downtrending WBC.     Pt. seen and examined while sitting up in chair this AM in SICU. Pt. states that he has some pain when he tries to urinate with Kennedy catheter in. Dru CP, SOB, N/V/F/C.  PAST HISTORY  --------------------------------------------------------------------------------  No significant changes to PMH, PSH, FHx, SHx, unless otherwise noted    ALLERGIES & MEDICATIONS  --------------------------------------------------------------------------------  Allergies    coconut (Anaphylaxis)  morphine (Pruritus; Rash)    Intolerances    Standing Inpatient Medications  allopurinol 100 milliGRAM(s) Oral daily  brimonidine 0.2% Ophthalmic Solution 1 Drop(s) Both EYES three times a day  chlorhexidine 2% Cloths 1 Application(s) Topical <User Schedule>  famotidine    Tablet 20 milliGRAM(s) Oral daily  fluconAZOLE   Tablet 400 milliGRAM(s) Oral daily  gabapentin 100 milliGRAM(s) Oral two times a day  latanoprost 0.005% Ophthalmic Solution 1 Drop(s) Both EYES daily  meropenem  IVPB 500 milliGRAM(s) IV Intermittent every 24 hours  oxybutynin 10 milliGRAM(s) Oral every 8 hours  polyethylene glycol 3350 17 Gram(s) Oral daily  predniSONE   Tablet 5 milliGRAM(s) Oral daily  senna 2 Tablet(s) Oral at bedtime  trimethoprim   80 mG/sulfamethoxazole 400 mG 1 Tablet(s) Oral daily  valGANciclovir 450 milliGRAM(s) Oral <User Schedule>    REVIEW OF SYSTEMS  --------------------------------------------------------------------------------  Gen: No fatigue, fevers/chills, weakness  Skin: No rashes  Head/Eyes/Ears/Mouth: No headache; No sore throat  Respiratory: No dyspnea, cough,   CV: No chest pain, PND, orthopnea  GI: No abdominal pain  Transplant: + pain by incision   : + hematuria    All other systems were reviewed and are negative, except as noted.  VITALS/PHYSICAL EXAM  --------------------------------------------------------------------------------  RICHMOND): 37.1 (01-08-20 @ 07:00), Max: 37.1 (01-07-20 @ 23:00)  HR: 73 (01-08-20 @ 08:00) (67 - 88)  BP: 94/52 (01-08-20 @ 08:00) (90/55 - 174/97)  RR: 14 (01-08-20 @ 08:00) (8 - 29)  SpO2: 100% (01-08-20 @ 08:00) (63% - 100%)  Wt(kg): --    01-07-20 @ 07:01  -  01-08-20 @ 07:00  --------------------------------------------------------  IN: 09052 mL / OUT: 1300 mL / NET: 48785 mL    01-08-20 @ 07:01  -  01-08-20 @ 09:44  --------------------------------------------------------  IN: 200 mL / OUT: 0 mL / NET: 200 mL    Physical Exam:  	Gen: NAD  	HEENT: Supple neck  	Pulm: CTA B/L  	CV: RRR, S1S2; no rub  	Abd: +BS, soft, nontender/nondistended                      Transplant: incision c/d/i  	: + Marcia with bloody urine  	LE: +1-2 b/l pitting ankle edema  	Neuro: No focal deficits  	Psych: Normal affect and mood  	Skin: Warm, without rashes  LABS/STUDIES  --------------------------------------------------------------------------------              8.5    29.86 >-----------<  409      [01-08-20 @ 09:04]              25.3     135  |  94  |  26  ----------------------------<  95      [01-08-20 @ 00:29]  4.7   |  24  |  5.13        Ca     8.4     [01-08-20 @ 00:29]      Mg     1.9     [01-08-20 @ 00:29]      Phos  4.5     [01-08-20 @ 00:29]    Creatinine Trend:  SCr 5.13 [01-08 @ 00:29]  SCr 7.39 [01-07 @ 00:39]  SCr 7.06 [01-06 @ 20:45]  SCr 6.37 [01-06 @ 04:55]  SCr 4.38 [01-05 @ 06:32]

## 2020-01-08 NOTE — CHART NOTE - NSCHARTNOTEFT_GEN_A_CORE
Nutrition Follow Up Note    Patient seen for: nutrition follow up and transfer to 8ICU. Pt with h/o DDRT , and chronic DGF.    Source: patient, medical record    Chart reviewed, events noted. Pt S/P DDRT  with chronic DGF, discharged on HD , readmitted  with +Flu/perinephretic collection; now S/P Ex Lap washout/ renal bx (20). Prelim bx results c/w ATN, no rejection.     Diet : Renal     PO intake : Pt interview pending     Last BM: , ,     Last HD: , -500ml    Daily Weight in k.4 (), Weight in k.6 (), Weight in k.1 (), Weight in k.1 (), Weight in k.2 (), Weight in k.1 (), Weight in k.2 ()    Drug Dosing Weight  Weight (kg): 63.5 (2020 08:22)  BMI (kg/m2): 19.5 (2020 08:22)    Pertinent Medications: MEDICATIONS  (STANDING):  allopurinol 100 milliGRAM(s) Oral daily  amiKACIN  IVPB 240 milliGRAM(s) IV Intermittent once  brimonidine 0.2% Ophthalmic Solution 1 Drop(s) Both EYES three times a day  chlorhexidine 2% Cloths 1 Application(s) Topical <User Schedule>  famotidine    Tablet 20 milliGRAM(s) Oral daily  fluconAZOLE   Tablet 400 milliGRAM(s) Oral daily  gabapentin 100 milliGRAM(s) Oral two times a day  latanoprost 0.005% Ophthalmic Solution 1 Drop(s) Both EYES daily  meropenem  IVPB 500 milliGRAM(s) IV Intermittent every 24 hours  oxybutynin 10 milliGRAM(s) Oral every 8 hours  polyethylene glycol 3350 17 Gram(s) Oral daily  predniSONE   Tablet 5 milliGRAM(s) Oral daily  senna 2 Tablet(s) Oral at bedtime  trimethoprim   80 mG/sulfamethoxazole 400 mG 1 Tablet(s) Oral daily  valGANciclovir 450 milliGRAM(s) Oral <User Schedule>    MEDICATIONS  (PRN):  acetaminophen   Tablet .. 650 milliGRAM(s) Oral every 6 hours PRN Mild Pain (1 - 3)  lidocaine 2% Jelly 1 milliLiter(s) IntraUrethral two times a day PRN irritation  traMADol 50 milliGRAM(s) Oral every 6 hours PRN Severe Pain (7 - 10)  traMADol 25 milliGRAM(s) Oral every 6 hours PRN Moderate Pain (4 - 6)    LABS:    @ 09:04:  Hemoglobin 8.5<L>, Hematocrit 25.3<L>   @ 00:29: Sodium 135, Potassium 4.7, Chloride 94<L>, Calcium 8.4, Magnesium 1.9, Phosphorus 4.5, BUN 26<H>, Creatinine 5.13<H>, BG 95, Alk Phos 184<H>, ALT/SGPT 6<L>, AST/SGOT 21, Total Protein 5.9<L>, Albumin 2.4<L>, Total Bilirubin 1.0, Direct Bilirubin 0.4<H>, Hemoglobin 9.0<L>, Hematocrit 25.5<L>   @ 18:26: Hemoglobin 8.6<L>, Hematocrit 25.4<L>    Skin per nursing documentation: no pressure injuries noted  Edema: 2+ dependent, 3+ right leg    Estimated Needs:   [X ] no change since previous assessment  [ ] recalculated:     Previous Nutrition Diagnosis: 1) Increased Nutrient Needs 2) Limited adherence to nutrition therapy education  Nutrition Diagnosis is: ongoing, being addressed with therapeutic diet and nutrition education reinforcement    New Nutrition Diagnosis: none       Interventions:     Recommend  1) Continue Renal diet  2)    Monitoring and Evaluation:     Continue to monitor nutritional intake, tolerance to diet prescription, weights, labs, skin integrity.    RD remains available upon request and will follow up per protocol.    America Anglin, MS MELONIE CDN [facility], Pager # [phone] Nutrition Follow Up Note    Patient seen for: nutrition follow up and transfer to 8ICU. Pt with h/o DDRT .    Source: patient, medical record    Chart reviewed, events noted. Pt S/P DDRT 19 with chronic DGF, discharged on HD 19, readmitted  with +Flu/perinephretic collection; now S/P Ex Lap washout/ renal bx (20). Prelim bx results c/w ATN, no rejection.     Diet : Renal     PO intake : Pt reports he is eating 100% of meals, amenable to drinking 1 Nepro daily to help meet increased protein needs for HD.     Last BM: , ,     Last HD: , -500ml    Daily Weight in k.4 (), Weight in k.6 (), Weight in k.1 (), Weight in k.1 (), Weight in k.2 (), Weight in k.1 (), Weight in k.2 (); weight change likely reflects fluid shifts on HD    Drug Dosing Weight  Weight (kg): 63.5 (2020 08:22)  BMI (kg/m2): 19.5 (2020 08:22)    Pertinent Medications: MEDICATIONS  (STANDING):  allopurinol 100 milliGRAM(s) Oral daily  amiKACIN  IVPB 240 milliGRAM(s) IV Intermittent once  brimonidine 0.2% Ophthalmic Solution 1 Drop(s) Both EYES three times a day  chlorhexidine 2% Cloths 1 Application(s) Topical <User Schedule>  famotidine    Tablet 20 milliGRAM(s) Oral daily  fluconAZOLE   Tablet 400 milliGRAM(s) Oral daily  gabapentin 100 milliGRAM(s) Oral two times a day  latanoprost 0.005% Ophthalmic Solution 1 Drop(s) Both EYES daily  meropenem  IVPB 500 milliGRAM(s) IV Intermittent every 24 hours  oxybutynin 10 milliGRAM(s) Oral every 8 hours  polyethylene glycol 3350 17 Gram(s) Oral daily  predniSONE   Tablet 5 milliGRAM(s) Oral daily  senna 2 Tablet(s) Oral at bedtime  trimethoprim   80 mG/sulfamethoxazole 400 mG 1 Tablet(s) Oral daily  valGANciclovir 450 milliGRAM(s) Oral <User Schedule>    MEDICATIONS  (PRN):  acetaminophen   Tablet .. 650 milliGRAM(s) Oral every 6 hours PRN Mild Pain (1 - 3)  lidocaine 2% Jelly 1 milliLiter(s) IntraUrethral two times a day PRN irritation  traMADol 50 milliGRAM(s) Oral every 6 hours PRN Severe Pain (7 - 10)  traMADol 25 milliGRAM(s) Oral every 6 hours PRN Moderate Pain (4 - 6)    LABS:    @ 09:04:  Hemoglobin 8.5<L>, Hematocrit 25.3<L>   @ 00:29: Sodium 135, Potassium 4.7, Chloride 94<L>, Calcium 8.4, Magnesium 1.9, Phosphorus 4.5, BUN 26<H>, Creatinine 5.13<H>, BG 95, Alk Phos 184<H>, ALT/SGPT 6<L>, AST/SGOT 21, Total Protein 5.9<L>, Albumin 2.4<L>, Total Bilirubin 1.0, Direct Bilirubin 0.4<H>, Hemoglobin 9.0<L>, Hematocrit 25.5<L>   @ 18:26: Hemoglobin 8.6<L>, Hematocrit 25.4<L>    Skin per nursing documentation: no pressure injuries noted  Edema: 2+ dependent, 3+ right leg    Estimated Needs:   [X ] no change since previous assessment  [ ] recalculated:     Previous Nutrition Diagnosis: 1) Increased Nutrient Needs 2) Limited adherence to nutrition therapy education  Nutrition Diagnosis is: ongoing, being addressed with therapeutic diet and nutrition education reinforcement    New Nutrition Diagnosis: none       Interventions:     Recommend  1) Continue Renal diet  2) Add 1 Nepro supplement to help meet increased protein needs for HD  3) Add daily Nephro-candace, renal multivitamin    Monitoring and Evaluation:     Continue to monitor nutritional intake, tolerance to diet prescription, weights, labs, skin integrity.    RD remains available upon request and will follow up per protocol.    America Anglin, MS RD CDN [facility], Pager # [phone] Nutrition Follow Up Note    Patient seen for: nutrition follow up and transfer to 8ICU. Pt with h/o DDRT .    Source: patient, medical record    Chart reviewed, events noted. Pt S/P DDRT 19 with chronic DGF, discharged on HD 19, readmitted  with +Flu/perinephretic collection; now S/P Ex Lap washout/ renal bx (20). Prelim bx results c/w ATN, no rejection.     Diet : Renal     PO intake : Pt reports he is eating 100% of meals, amenable to drinking 1 Nepro daily to help meet increased protein needs for HD.     Last BM: , ,     Last HD: , -500ml    Daily Weight in k.4 (), Weight in k.6 (), Weight in k.1 (), Weight in k.1 (), Weight in k.2 (), Weight in k.1 (), Weight in k.2 (); weight change likely reflects fluid shifts on HD    Drug Dosing Weight  Weight (kg): 63.5 (2020 08:22)  BMI (kg/m2): 19.5 (2020 08:22)    Pertinent Medications: MEDICATIONS  (STANDING):  allopurinol 100 milliGRAM(s) Oral daily  amiKACIN  IVPB 240 milliGRAM(s) IV Intermittent once  brimonidine 0.2% Ophthalmic Solution 1 Drop(s) Both EYES three times a day  chlorhexidine 2% Cloths 1 Application(s) Topical <User Schedule>  famotidine    Tablet 20 milliGRAM(s) Oral daily  fluconAZOLE   Tablet 400 milliGRAM(s) Oral daily  gabapentin 100 milliGRAM(s) Oral two times a day  latanoprost 0.005% Ophthalmic Solution 1 Drop(s) Both EYES daily  meropenem  IVPB 500 milliGRAM(s) IV Intermittent every 24 hours  oxybutynin 10 milliGRAM(s) Oral every 8 hours  polyethylene glycol 3350 17 Gram(s) Oral daily  predniSONE   Tablet 5 milliGRAM(s) Oral daily  senna 2 Tablet(s) Oral at bedtime  trimethoprim   80 mG/sulfamethoxazole 400 mG 1 Tablet(s) Oral daily  valGANciclovir 450 milliGRAM(s) Oral <User Schedule>    MEDICATIONS  (PRN):  acetaminophen   Tablet .. 650 milliGRAM(s) Oral every 6 hours PRN Mild Pain (1 - 3)  lidocaine 2% Jelly 1 milliLiter(s) IntraUrethral two times a day PRN irritation  traMADol 50 milliGRAM(s) Oral every 6 hours PRN Severe Pain (7 - 10)  traMADol 25 milliGRAM(s) Oral every 6 hours PRN Moderate Pain (4 - 6)    LABS:    @ 09:04:  Hemoglobin 8.5<L>, Hematocrit 25.3<L>   @ 00:29: Sodium 135, Potassium 4.7, Chloride 94<L>, Calcium 8.4, Magnesium 1.9, Phosphorus 4.5, BUN 26<H>, Creatinine 5.13<H>, BG 95, Alk Phos 184<H>, ALT/SGPT 6<L>, AST/SGOT 21, Total Protein 5.9<L>, Albumin 2.4<L>, Total Bilirubin 1.0, Direct Bilirubin 0.4<H>, Hemoglobin 9.0<L>, Hematocrit 25.5<L>   @ 18:26: Hemoglobin 8.6<L>, Hematocrit 25.4<L>    Skin per nursing documentation: no pressure injuries noted  Edema: 2+ dependent, 3+ right leg    Estimated Needs:   [X ] no change since previous assessment  [ ] recalculated:     Previous Nutrition Diagnosis: 1) Increased Nutrient Needs 2) Limited adherence to nutrition therapy education  Nutrition Diagnosis is: ongoing, being addressed with therapeutic diet and nutrition education reinforcement    New Nutrition Diagnosis: none       Interventions:     Recommend  1) Continue Renal diet in-house and upon discharge.  2) Add 1 Nepro supplement to help meet increased protein needs for HD.  3) Add daily Nephro-candace, renal multivitamin.  4) Continue to reinforce diet education prior to discharge; not appropriate at this time in setting of acute illness and pain.    Monitoring and Evaluation:     Continue to monitor nutritional intake, tolerance to diet prescription, weights, labs, skin integrity.    RD remains available upon request and will follow up per protocol.    America Anglin, MS RD CDN [facility], Pager # [phone]

## 2020-01-08 NOTE — PROGRESS NOTE ADULT - SUBJECTIVE AND OBJECTIVE BOX
Seen in ICU awake, alert    Vital Signs Last 24 Hrs  T(C): 37 (01-08-20 @ 15:00), Max: 37.5 (01-08-20 @ 11:00)  T(F): 98.6 (01-08-20 @ 15:00), Max: 99.5 (01-08-20 @ 11:00)  HR: 77 (01-08-20 @ 17:00) (73 - 88)  BP: 116/72 (01-08-20 @ 17:00) (90/55 - 161/80)  BP(mean): 90 (01-08-20 @ 17:00) (65 - 110)  RR: 16 (01-08-20 @ 17:00) (9 - 29)  SpO2: 98% (01-08-20 @ 17:00) (66% - 100%)    Card S1S2  Lungs b/l air entry  Abd soft, ND  Extr + edema, AVF patent                                                                          8.5    29.86 )-----------( 409      ( 08 Jan 2020 09:04 )             25.3     08 Jan 2020 00:29    135    |  94     |  26     ----------------------------<  95     4.7     |  24     |  5.13     Ca    8.4        08 Jan 2020 00:29  Phos  4.5       08 Jan 2020 00:29  Mg     1.9       08 Jan 2020 00:29    TPro  5.9    /  Alb  2.4    /  TBili  1.0    /  DBili  0.4    /  AST  21     /  ALT  6      /  AlkPhos  184    08 Jan 2020 00:29    LIVER FUNCTIONS - ( 08 Jan 2020 00:29 )  Alb: 2.4 g/dL / Pro: 5.9 g/dL / ALK PHOS: 184 U/L / ALT: 6 U/L / AST: 21 U/L / GGT: x           PT/INR - ( 08 Jan 2020 00:29 )   PT: 14.9 sec;   INR: 1.30 ratio      Culture - Blood (collected 07 Jan 2020 13:23)  Source: .Blood Blood-Venous  Preliminary Report (08 Jan 2020 14:02):    No growth to date.    Culture - Blood (collected 07 Jan 2020 13:23)  Source: .Blood Blood-Venous  Preliminary Report (08 Jan 2020 14:02):    No growth to date.    Culture - Urine (collected 07 Jan 2020 00:18)  Source: .Urine Catheterized  Final Report (07 Jan 2020 21:22):    <10,000 CFU/mL Normal Urogenital Gillian    Culture - Body Fluid with Gram Stain (collected 06 Jan 2020 21:03)  Source: Abdominal Fl Abdominal Fluid  Gram Stain (06 Jan 2020 23:41):    No polymorphonuclear leukocytes seen    No organisms seen    by cytocentrifuge  Preliminary Report (07 Jan 2020 17:27):    No growth    acetaminophen   Tablet .. 650 milliGRAM(s) Oral every 6 hours PRN  allopurinol 100 milliGRAM(s) Oral daily  brimonidine 0.2% Ophthalmic Solution 1 Drop(s) Both EYES three times a day  chlorhexidine 2% Cloths 1 Application(s) Topical <User Schedule>  famotidine    Tablet 20 milliGRAM(s) Oral daily  fluconAZOLE   Tablet 400 milliGRAM(s) Oral daily  gabapentin 100 milliGRAM(s) Oral two times a day  heparin  Infusion 1000 Unit(s)/Hr IV Continuous <Continuous>  latanoprost 0.005% Ophthalmic Solution 1 Drop(s) Both EYES daily  lidocaine 2% Jelly 1 milliLiter(s) IntraUrethral two times a day PRN  meropenem  IVPB 500 milliGRAM(s) IV Intermittent every 24 hours  oxybutynin 10 milliGRAM(s) Oral every 8 hours  polyethylene glycol 3350 17 Gram(s) Oral daily  predniSONE   Tablet 5 milliGRAM(s) Oral daily  senna 2 Tablet(s) Oral at bedtime  traMADol 50 milliGRAM(s) Oral every 6 hours PRN  traMADol 25 milliGRAM(s) Oral every 6 hours PRN  trimethoprim   80 mG/sulfamethoxazole 400 mG 1 Tablet(s) Oral daily  valGANciclovir 450 milliGRAM(s) Oral <User Schedule>    A/P:    Adm w/Flu A 12/27  Hx ESRD on HD  S/p DDRT 12/8/2019, DGF  S/p transplant kidney bx 12/13: ATN, focal TMA  S/p PLEX 12/13 and 12/15  Carlos-nephric hematoma noted on ultrasound 12/27, increased from prior  S/p OR 1/1 for washout and repeat renal graft biopsy  Repeat renal bx c/w ATN  Infected carlos-nephric hematoma, s/p drainage w/IR  Abx per ID   following  Close f/u of renal fx, UO  Avoid hypotension  Avoid nephrotoxins  HD tomorrow pending clinical course

## 2020-01-08 NOTE — PROGRESS NOTE ADULT - SUBJECTIVE AND OBJECTIVE BOX
Transplant Surgery - Multidisciplinary Rounds  --------------------------------------------------------------  R DDRT    Date:  12/8/19 Dr. Espinal, readmitted 12/27/19 with +Flu/perinephretic collection  Ex Lap washout/ renal bx  Date:  1/1/20    Present:   Patient seen with multidisciplinary team including Transplant Surgeon: Dr. Poe. Transplant Nephrologist: Dr. Lowery and renal fellow, pharmacist CAROL Tamez, NP/PA: Charmaine Aviles. and examined with Dr. Poe. Disciplines not in attendance will be notified of the plan.      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 Young, followed by Dr. Raphael, [facility] Oncologist), RA with hand contractures. 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 Post op course c/b DGF requiring HD, thrombocytopenia, elevated LDH and Haptoglobulin. Schistocytes  on peripheral smear concerning for TMA. Envarsus held. Received Belatacept 12/13. Started on PLEX (12/12, 12/15). Underwent renal bx on 12/13 c/w profound ATN.  Found to have much improvement to levels, likely related to Tacrolimus. He was discharged home on 12/20/19 w/ outpatient HD.     Re-admitted 12/27 with influenza, completed treatement with Tamiflu on 12/31. Urine cx on admission grew Ent cloacae, was started on meropenem 12/31.   Renal US on admission with increasing hematoma. OR on 1/1 for ex-lap, hematoma evacuation, washout and renal bx. OR cxs growing Ent. clocae.  Prelim bx results c/w ATN, no rejection.     cxs:   1/6-IR drain cx pending result  1/1 OR cx - Ent Clocae  1/1 OR fluid - GNR Ecoli   12/29 Urine - Ent Clocae  12/27 Bld - Neg    Interval events:  POD 7  s/p ex-lap/washout Renal bx  - Was upgraded to SICU Sunday 1/5 for elevated WBC, abd pain, temp, and hypotension   - S/P IR drain of collection with 18ml clear output 1/6  - Developed hematuria post IR with 700ml juan red blood, urology placed 3 way catheter and started CBI this am  - H&H dropped received 3 u PRBC with improved H&H   - WBC trending down from 46 to 29 - MMF held  - S/P HD yesterday       Potential Discharge date: pending clinical improvement     MEDICATIONS  (STANDING):  allopurinol 100 milliGRAM(s) Oral daily  brimonidine 0.2% Ophthalmic Solution 1 Drop(s) Both EYES three times a day  chlorhexidine 2% Cloths 1 Application(s) Topical <User Schedule>  famotidine    Tablet 20 milliGRAM(s) Oral daily  fluconAZOLE   Tablet 400 milliGRAM(s) Oral daily  gabapentin 100 milliGRAM(s) Oral two times a day  latanoprost 0.005% Ophthalmic Solution 1 Drop(s) Both EYES daily  meropenem  IVPB 500 milliGRAM(s) IV Intermittent every 24 hours  oxybutynin 10 milliGRAM(s) Oral every 8 hours  polyethylene glycol 3350 17 Gram(s) Oral daily  predniSONE   Tablet 5 milliGRAM(s) Oral daily  senna 2 Tablet(s) Oral at bedtime  trimethoprim   80 mG/sulfamethoxazole 400 mG 1 Tablet(s) Oral daily  valGANciclovir 450 milliGRAM(s) Oral <User Schedule>    MEDICATIONS  (PRN):  acetaminophen   Tablet .. 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  Carpal tunnel syndrome  Brain cyst: had MRI recently- now gone  Anemia  Gastric ulcer: Long time ago  Rheumatoid arthritis  Renal transplant recipient  Breakdown (mechanical) of penile (implanted) prosthesis, sequela  End-stage renal disease (ESRD): PERMACATH PLACEMENT  Abscess of left buttock: s/p I &amp; D  AV fistula: placement left lower arm  S/P cystoscopy: stent placement &amp; removal for kidney stones, lithiotripsy  s/p Lt Carpal tunnel: 2011      Vital Signs Last 24 Hrs  T(C): 37.1 (08 Jan 2020 07:00), Max: 37.1 (07 Jan 2020 23:00)  T(F): 98.8 (08 Jan 2020 07:00), Max: 98.8 (07 Jan 2020 23:00)  HR: 74 (08 Jan 2020 09:00) (67 - 88)  BP: 107/65 (08 Jan 2020 09:00) (90/55 - 174/97)  BP(mean): 81 (08 Jan 2020 09:00) (65 - 129)  RR: 22 (08 Jan 2020 09:00) (8 - 29)  SpO2: 100% (08 Jan 2020 09:00) (63% - 100%)    I&O's Summary    07 Jan 2020 07:01  -  08 Jan 2020 07:00  --------------------------------------------------------  IN: 72006 mL / OUT: 1300 mL / NET: 49174 mL    08 Jan 2020 07:01  -  08 Jan 2020 09:53  --------------------------------------------------------  IN: 3200 mL / OUT: 0 mL / NET: 3200 mL                              8.5    29.86 )-----------( 409      ( 08 Jan 2020 09:04 )             25.3     01-08    135  |  94<L>  |  26<H>  ----------------------------<  95  4.7   |  24  |  5.13<H>    Ca    8.4      08 Jan 2020 00:29  Phos  4.5     01-08  Mg     1.9     01-08    TPro  5.9<L>  /  Alb  2.4<L>  /  TBili  1.0  /  DBili  0.4<H>  /  AST  21  /  ALT  6<L>  /  AlkPhos  184<H>  01-08          Culture - Urine (collected 01-07-20 @ 00:18)  Source: .Urine Catheterized  Final Report (01-07-20 @ 21:22):    <10,000 CFU/mL Normal Urogenital Gillian    Culture - Body Fluid with Gram Stain (collected 01-06-20 @ 21:03)  Source: Abdominal Fl Abdominal Fluid  Gram Stain (01-06-20 @ 23:41):    No polymorphonuclear leukocytes seen    No organisms seen    by cytocentrifuge  Preliminary Report (01-07-20 @ 17:27):    No growth    Culture - Other (collected 01-05-20 @ 13:59)  Source: Skin from incision  Final Report (01-07-20 @ 11:15):    No growth at 48 hours    Culture - Urine (collected 01-05-20 @ 13:59)  Source: .Urine Clean Catch (Midstream)  Final Report (01-07-20 @ 20:09):    >100,000 CFU/ml Enterobacter cloacae  Organism: Enterobacter cloacae (01-07-20 @ 20:09)  Organism: Enterobacter cloacae (01-07-20 @ 20:09)    Culture - Blood (collected 01-04-20 @ 13:07)  Source: .Blood Blood  Preliminary Report (01-05-20 @ 14:01):    No growth to date.    Culture - Blood (collected 01-04-20 @ 13:07)  Source: .Blood Blood  Preliminary Report (01-05-20 @ 14:01):    No growth to date.    Culture - Tissue with Gram Stain (collected 01-01-20 @ 17:12)  Source: .Tissue Other  Gram Stain (01-02-20 @ 00:25):    Rare polymorphonuclear leukocytes seen per low power field    Rare Gram Negative Rods seen per oil power field  Final Report (01-06-20 @ 07:47):    Numerous Enterobacter cloacae complex  Organism: Enterobacter cloacae complex (01-06-20 @ 07:47)  Organism: Enterobacter cloacae complex (01-06-20 @ 07:47)    Culture - Surgical Swab (collected 01-01-20 @ 17:06)  Source: .Surgical Swab collection around right kidney  Final Report (01-06-20 @ 07:45):    Moderate Enterobacter cloacae complex  Organism: Enterobacter cloacae complex (01-06-20 @ 15:11)  Organism: Enterobacter cloacae complex (01-06-20 @ 07:45)    Culture - Body Fluid with Gram Stain (collected 01-01-20 @ 16:59)  Source: .Body Fluid periphrenic collection  Gram Stain (01-02-20 @ 09:22):    Upon re-evaluation of gram stain: Rare Gram Negative Rods seen    No polymorphonuclear cells seen    by cytocentrifuge  Final Report (01-06-20 @ 13:03):    Moderate Escherichia coli    Moderate Enterobacter cloacae (Carbapenem Resistant)  Organism: Escherichia coli  Enterobacter cloacae (Carbapenem Resistant) (01-06-20 @ 08:44)  Organism: Enterobacter cloacae (Carbapenem Resistant) (01-06-20 @ 08:44)  Organism: Escherichia coli (01-06-20 @ 08:44)    Culture - Surgical Swab (collected 01-01-20 @ 16:51)  Source: .Surgical Swab right kidney abscess  Final Report (01-06-20 @ 07:45):    Moderate Enterobacter cloacae complex  Organism: Enterobacter cloacae complex (01-06-20 @ 07:45)  Organism: Enterobacter cloacae complex (01-06-20 @ 07:45)              REVIEW OF SYSTEMS  Gen: No weight changes, fatigue, fevers/chills, weakness  Skin: No rashes  Head/Eyes/Ears/Mouth: No headache; Normal hearing; Normal vision w/o blurriness; No sinus pain/discomfort, sore throat  Respiratory: No dyspnea, cough, wheezing, hemoptysis  CV: No chest pain, PND, orthopnea  GI: c/o incisional pain No diarrhea, constipation, nausea, vomiting, melena, hematochezia  : No increased frequency, dysuria, hematuria, nocturia  MSK: No joint pain/swelling; no back pain; no edema  Neuro: No dizziness/lightheadedness, weakness, seizures, numbness, tingling  Heme: No easy bruising or bleeding  Endo: No heat/cold intolerance  Psych: No significant nervousness, anxiety, stress, depression      PHYSICAL EXAM:    Constitutional: Well developed / well nourished  	Eyes: Anicteric, PERRLA  	ENMT: nc/at   	Neck: Supple  	Respiratory: CTA     	Cardiovascular: RRR  	Gastrointestinal: Soft abdomen, tender to palpation ND. incision w/ serosanguinous drainage, BO w/ minimal drainage    Genitourinary:  Kennedy 3way in place (placed by urology) - CBI in progress    Extremities: SCD's in place and working bilaterally  	Vascular: faintly palpable pulses b/l LE; 3+ pitting edema. R > L   	Neurological: A&O x3  	Skin: moderate strike through on the dressing, BO serosanguinous   	Musculoskeletal: Moving all extremities    Psychiatric: Responsive

## 2020-01-08 NOTE — PROGRESS NOTE ADULT - PROBLEM SELECTOR PLAN 2
s/p belatacept 12/28/19  - Belatacept currently held d/t worsening leukocytosis and concern for infected perinephric fluid collection. Plan for infusion tomorrow.  - discontinue MMF for concern of infected perinephric collection.  - continue prednisone to 5 mg daily   - Continue Amikacin  - continue ppx: Nystatin, bactrim, Valcyte renally dosed TIW, Pepcid

## 2020-01-08 NOTE — PROGRESS NOTE ADULT - PROBLEM SELECTOR PLAN 4
UTI Gram negative rods  - continue meropenem IV empirical (started 12/30)  - Check blood culture, CXR.

## 2020-01-08 NOTE — PROGRESS NOTE ADULT - ASSESSMENT
ASSESSMENT:  KAYLA PA is a 49y Male with history of ESRD on HD  (via LUE AVF), HTN, Gout, Glaucoma, PNET s/p robotic distal panc/splenectomy (at [place] 2015 by Dr. Young, followed by Dr. Raphael, [facility] Oncologist), RA with hand contractures who underwent DDRT on 12/8/19 (ureteral stent sutured to Kennedy - removed 12/15). Post op course c/b DGF requiring HD, thrombocytopenia, elevated LDH and Haptoglobulin. Schistocytes  on peripheral smear concerning for TMA. Envarsus held. Underwent renal bx on 12/13 c/w profound ATN. 1/1/2020 was taken to OR and found to have a perinephric hematoma. Underwent washout, evacuation of hematoma, and biopsy of transplanted kidney. He was recovering on the floor when overnight he spiked a fever to 38.1 and had 18 beats of wide complex tachycardia. He was also found to have worsening leukocytosis and became acutely hypotensive. A CT scan was obtained deomnstrating a 4.5x3.7 perinephric collection. IR was consulted for drainage and SICU was consulted for hemodynamic monitoring.    PLAN:    NEURO:  - Tylenol 650 mg q6 prn (mild)  - Tramadol 25/50 mg q6 prn (mod/severe)  - Gabapentin 100 mg BID  - Dilaudid 0.25mg for breakthrough pain.    RESPIRATORY:   - No active issues  - OOB as tolerated, PT    CARDIOVASCULAR:  - Monitor hemodynamics  - f/u cards recs for wide complex tachycardia  - Hold home hydralazine, metoprolol    GI/NUTRITION:  - NPO  - Famotidine 20 mg daily  - Senna 2 tabs qhs  - Transplant meds: prednisone 5 mg daily, allopurinol 100 mg daily  - MMF per transplant team    GENITOURINARY/RENAL:  - IVL  - Monitor electrolytes  - Continue w/ HD for now  - f/u renal biopsy pathology  - Kennedy Catheter    HEMATOLOGIC:  - Holding SQH for VTE ppx  - Trend H/H  - LE duplex neg for DVT    INFECTIOUS DISEASE:  - Meropenem 500 mg daily  - PPX: fluconazole 200 mg daily, valganciclovir 450 mg three times weekly, bactrim 1 tab daily  - Appreciate ID input  -redose of amikacin     ENDOCRINE:  - Monitor glucose on BMP    OPHTHALMOLOGY:  - Brimonidine/latanoprost drops

## 2020-01-08 NOTE — PROGRESS NOTE ADULT - ASSESSMENT
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 Post op course c/b DGF s/p renal bx on 12/13 c/b perirenal hematoma/abscess s/p washout 1/1. Patient is s/p IR aspiration of persistent collection seen inferior to pelvic kidney, now with new-onset gross hematuria    -- US revealed unchanged size of perinephric hematoma  -- CBI clamped for 1 hour this afternoon - urine dark red, CBI resumed - urine clear  -- Will keep martinez catheter in place  -- Monitor urine color; irrigate prn  -- UCx neg  -- Monitor H&H and vital signs

## 2020-01-08 NOTE — PROGRESS NOTE ADULT - SUBJECTIVE AND OBJECTIVE BOX
SICU DAILY PROGRESS NOTE    KAYLA PA is a 49y Male PMHx of ESRD on HD  (via LUE AVF), HTN, Gout, Glaucoma, PNET s/p robotic distal panc/splenectomy (at [place] 2015 by Dr. Young, followed by Dr. Raphael, [facility] Oncologist), RA with hand contractures. 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 Post op course c/b DGF requiring HD, thrombocytopenia, elevated LDH and Haptoglobulin. Schistocytes  on peripheral smear concerning for TMA. Envarsus held. Received Belatacept 12/13. Started on PLEX (12/12, 12/15). Underwent renal bx on 12/13 c/w profound ATN.  Found to have much improvement to levels, likely related to Tacrolimus. He was discharged home on 12/20/19 w/ outpatient HD.     He was now sent to ED by dialysis center after he was found to be febrile to 102, he did not get HD this AM, and last full HD session was on 12/26. Upon arrival, he was febrile to 101.7, other vitals were stable. He states that he had a cough that began 5-6 days ago that has now mostly resolved. He reports no other febrile episodes other than this morning. He admits to having one SOB episode earlier this week that resolved after he got HD. He also states that his mother was hospitalized earlier this week and was flu+. Currently makes less than 1 cup urine/daily. He currently denies SOB, CP, dyspnea, HA, dizziness, N/V, diarrhea, constipation.     1/1/2020 was taken to OR and found to have a perinephric hematoma. Underwent washout, evacuation of hematoma, and biopsy of transplanted kidney. He was recovering on the floor when overnight he spiked a fever to 38.1 and had 18 beats of wide complex tachycardia. He was also found to have worsening leukocytosis and became acutely hypotensive. A CT scan was obtained demonstrating a 4.5x3.7 perinephric collection. IR was consulted for drainage and SICU was consulted for hemodynamic monitoring.      24 HOUR EVENTS:  - started on CBI due to continued haematuria   - fluids at 50cc/hr initially but not IVL    s/p dialysis 500mL removed   - given dose of amikacin  - s.p 2U pRBCs for downtrending H/H    -renal US performed demonstrating unchanged size of carlos-nephric hematoma     SUBJECTIVE/ROS:  [ x] A ten-point review of systems was otherwise negative except as noted.  [ ] Due to altered mental status/intubation, subjective information were not able to be obtained from the patient. History was obtained, to the extent possible, from review of the chart and collateral sources of information.      NEURO  Exam: awake, alert, oriented x4   Meds: acetaminophen   Tablet .. 650 milliGRAM(s) Oral every 6 hours PRN Mild Pain (1 - 3)  gabapentin 100 milliGRAM(s) Oral two times a day  traMADol 50 milliGRAM(s) Oral every 6 hours PRN Severe Pain (7 - 10)  traMADol 25 milliGRAM(s) Oral every 6 hours PRN Moderate Pain (4 - 6)    [x] Adequacy of sedation and pain control has been assessed and adjusted      RESPIRATORY  RR: 12 (01-08-20 @ 01:00) (8 - 29)  SpO2: 98% (01-08-20 @ 01:00) (63% - 100%)  Wt(kg): --  Exam: unlabored, clear to auscultation bilaterally  Mechanical Ventilation:     [N/A] Extubation Readiness Assessed  Meds:       CARDIOVASCULAR  HR: 74 (01-08-20 @ 01:00) (67 - 102)  BP: 101/62 (01-08-20 @ 01:00) (90/55 - 174/97)  BP(mean): 77 (01-08-20 @ 01:00) (65 - 129)  ABP: --  ABP(mean): --  Wt(kg): --  CVP(cm H2O): --  VBG - ( 08 Jan 2020 01:39 )  pH: 7.46  /  pCO2: 40    /  pO2: 55    / HCO3: 28    / Base Excess: 3.9   /  SaO2: 87     Lactate: 1.2                Exam: regular rate and rhythm  Cardiac Rhythm: sinus  Perfusion     [x]Adequate   [ ]Inadequate  Mentation   [x]Normal       [ ]Reduced  Extremities  [x]Warm         [ ]Cool  Volume Status [ ]Hypervolemic [x]Euvolemic [ ]Hypovolemic  Meds:       GI/NUTRITION  Exam: soft, nontender, nondistended, incision C/D/I  Diet: regular   Meds: famotidine    Tablet 20 milliGRAM(s) Oral daily  polyethylene glycol 3350 17 Gram(s) Oral daily  senna 2 Tablet(s) Oral at bedtime      GENITOURINARY  I&O's Detail    01-06 @ 07:01  -  01-07 @ 07:00  --------------------------------------------------------  IN:    dextrose 5% + sodium chloride 0.45%.: 30 mL    dextrose 5% + sodium chloride 0.9%: 390 mL    dextrose 5% + sodium chloride 0.9%: 150 mL    Solution: 100 mL  Total IN: 670 mL    OUT:    Indwelling Catheter - Urethral: 325 mL    Voided: 700 mL  Total OUT: 1025 mL    Total NET: -355 mL      01-07 @ 07:01 - 01-08 @ 02:43  --------------------------------------------------------  IN:    Continuous Bladder Irrigation: 95906 mL    dextrose 5% + sodium chloride 0.9%: 30 mL    dextrose 5% + sodium chloride 0.9%: 550 mL    Instillation: 3000 mL    Other: 800 mL    Packed Red Blood Cells: 700 mL    Solution: 200 mL  Total IN: 05913 mL    OUT:    Other: 1300 mL  Total OUT: 1300 mL    Total NET: 42473 mL          01-08    135  |  94<L>  |  26<H>  ----------------------------<  95  4.7   |  24  |  5.13<H>    Ca    8.4      08 Jan 2020 00:29  Phos  4.5     01-08  Mg     1.9     01-08    TPro  5.9<L>  /  Alb  2.4<L>  /  TBili  1.0  /  DBili  0.4<H>  /  AST  21  /  ALT  6<L>  /  AlkPhos  184<H>  01-08    [ x] Kennedy catheter, indication: N/A  Meds: oxybutynin 10 milliGRAM(s) Oral every 8 hours        HEMATOLOGIC  Meds:   [x] VTE Prophylaxis                        9.0    33.22 )-----------( 388      ( 08 Jan 2020 00:29 )             25.5     PT/INR - ( 08 Jan 2020 00:29 )   PT: 14.9 sec;   INR: 1.30 ratio         PTT - ( 08 Jan 2020 00:29 )  PTT:26.8 sec  Transfusion     [ ] PRBC   [ ] Platelets   [ ] FFP   [ ] Cryoprecipitate      INFECTIOUS DISEASES  WBC Count: 33.22 K/uL (01-08 @ 00:29)  WBC Count: 31.61 K/uL (01-07 @ 18:26)  WBC Count: 46.14 K/uL (01-07 @ 09:54)  WBC Count: 39.44 K/uL (01-07 @ 04:29)    RECENT CULTURES:  Specimen Source: .Urine Catheterized  Date/Time: 01-07 @ 00:18  Culture Results:   <10,000 CFU/mL Normal Urogenital Gillian  Gram Stain: --  Organism: --  Specimen Source: Abdominal Fl Abdominal Fluid  Date/Time: 01-06 @ 21:03  Culture Results:   No growth  Gram Stain:   No polymorphonuclear leukocytes seen  No organisms seen  by cytocentrifuge  Organism: --  Specimen Source: .Urine Clean Catch (Midstream)  Date/Time: 01-05 @ 13:59  Culture Results:   >100,000 CFU/ml Enterobacter cloacae  Gram Stain: --  Organism: Enterobacter cloacae  Specimen Source: .Blood Blood  Date/Time: 01-04 @ 13:07  Culture Results:   No growth to date.  Gram Stain: --  Organism: --    Meds: fluconAZOLE   Tablet 400 milliGRAM(s) Oral daily  meropenem  IVPB 500 milliGRAM(s) IV Intermittent every 24 hours  trimethoprim   80 mG/sulfamethoxazole 400 mG 1 Tablet(s) Oral daily  valGANciclovir 450 milliGRAM(s) Oral <User Schedule>        ENDOCRINE  CAPILLARY BLOOD GLUCOSE        Meds: allopurinol 100 milliGRAM(s) Oral daily  predniSONE   Tablet 5 milliGRAM(s) Oral daily        ACCESS DEVICES:  [ ] Peripheral IV  [ ] Central Venous Line	[ ] R	[ ] L	[ ] IJ	[ ] Fem	[ ] SC	Placed:   [ ] Arterial Line		[ ] R	[ ] L	[ ] Fem	[ ] Rad	[ ] Ax	Placed:   [ ] PICC:					[ ] Mediport  [ ] Urinary Catheter, Date Placed:   [x] Necessity of urinary, arterial, and venous catheters discussed    OTHER MEDICATIONS:  brimonidine 0.2% Ophthalmic Solution 1 Drop(s) Both EYES three times a day  chlorhexidine 2% Cloths 1 Application(s) Topical <User Schedule>  latanoprost 0.005% Ophthalmic Solution 1 Drop(s) Both EYES daily  lidocaine 2% Jelly 1 milliLiter(s) IntraUrethral two times a day PRN      CODE STATUS:  full code

## 2020-01-08 NOTE — PROGRESS NOTE ADULT - ATTENDING COMMENTS
I personally saw and examined patient together with transplant team and discussed case with ICU team.  IMMUNOSUPPRESSION:  Plan to administer belatacept IV in AM  Repeat US shows DVT of right lower extremity.  Will obtain US to evaluate iliac and renal veins.  Vacular consult to consider anticoagulation / other options given hematuria.

## 2020-01-08 NOTE — PROGRESS NOTE ADULT - SUBJECTIVE AND OBJECTIVE BOX
Subjective  Now s/p 2 u pRBC. Required irrigation overnight. Pt resting comfortably in bed    Objective    Vital signs  T(F): , Max: 99.5 (01-08-20 @ 11:00)  HR: 79 (01-08-20 @ 16:00)  BP: 113/74 (01-08-20 @ 16:00)  SpO2: 98% (01-08-20 @ 16:00)  Wt(kg): --    Output     01-07 @ 07:01  -  01-08 @ 07:00  --------------------------------------------------------  IN: 85445 mL / OUT: 1300 mL / NET: 21579 mL    01-08 @ 07:01  -  01-08 @ 16:12  --------------------------------------------------------  IN: 9510 mL / OUT: 4030 mL / NET: 5480 mL        Gen: NAD  : martinez outut clear pink on slow gtt CBI    Labs      01-08 @ 09:04    WBC 29.86 / Hct 25.3  / SCr --       01-08 @ 00:29    WBC 33.22 / Hct 25.5  / SCr 5.13     Culture - Urine (01.07.20 @ 00:18)    Specimen Source: .Urine Catheterized    Culture Results:   <10,000 CFU/mL Normal Urogenital Gillian      Imaging  < from: US Trans Kidney w/ Doppler, Right (01.07.20 @ 15:29) >  IMPRESSION:     Perinephric hematoma not significantly changed in size with decreased echogenicity likely related to liquefaction.    Peak systolic velocity at the transplant renal artery anastomosis has decreased from 444 cm to 281 cm/s since 12/31/2019. Continued follow-up is recommended.      < end of copied text >

## 2020-01-08 NOTE — PROGRESS NOTE ADULT - SUBJECTIVE AND OBJECTIVE BOX
Follow Up:  Infected Hematoma, Fever,    Interval History: afebrile overnight. bladder spasms improved. no n/v/d     REVIEW OF SYSTEMS  [  ] ROS unobtainable because:    [x  ] All other systems negative except as noted below    Constitutional:  [x ] fever [ ] chills  [ ] weight loss  [ ] weakness  Skin:  [ ] rash [ ] phlebitis	  Eyes: [ ] icterus [ ] pain  [ ] discharge	  ENMT: [ ] sore throat  [ ] thrush [ ] ulcers [ ] exudates  Respiratory: [ ] dyspnea [ ] hemoptysis [ ] cough [ ] sputum	  Cardiovascular:  [ ] chest pain [ ] palpitations [ ] edema	  Gastrointestinal:  [ ] nausea [ ] vomiting [ ] diarrhea [ ] constipation [x ] pain	  Genitourinary:  [ ] dysuria [ ] frequency [ x] hematuria [ ] discharge [ ] flank pain  [ ] incontinence  Musculoskeletal:  [ ] myalgias [ ] arthralgias [ ] arthritis  [ ] back pain  Neurological:  [ ] headache [ ] seizures  [ ] confusion/altered mental status    Allergies  coconut (Anaphylaxis)  morphine (Pruritus; Rash)        ANTIMICROBIALS:  fluconAZOLE   Tablet 400 daily  meropenem  IVPB 500 every 24 hours  trimethoprim   80 mG/sulfamethoxazole 400 mG 1 daily  valGANciclovir 450 <User Schedule>      OTHER MEDS:  MEDICATIONS  (STANDING):  acetaminophen   Tablet .. 650 every 6 hours PRN  allopurinol 100 daily  famotidine    Tablet 20 daily  gabapentin 100 two times a day  heparin  Infusion 1000 <Continuous>  oxybutynin 10 every 8 hours  polyethylene glycol 3350 17 daily  predniSONE   Tablet 5 daily  senna 2 at bedtime  traMADol 50 every 6 hours PRN  traMADol 25 every 6 hours PRN      Vital Signs Last 24 Hrs  T(C): 37 (08 Jan 2020 15:00), Max: 37.5 (08 Jan 2020 11:00)  T(F): 98.6 (08 Jan 2020 15:00), Max: 99.5 (08 Jan 2020 11:00)  HR: 79 (08 Jan 2020 18:00) (73 - 88)  BP: 119/69 (08 Jan 2020 18:00) (90/55 - 161/80)  BP(mean): 88 (08 Jan 2020 18:00) (65 - 110)  RR: 21 (08 Jan 2020 18:00) (9 - 29)  SpO2: 100% (08 Jan 2020 18:00) (96% - 100%)    PHYSICAL EXAMINATION:  General: Alert and Awake, NAD  HEENT: PERRL, EOMI  Neck: Supple  Cardiac: RRR, No M/R/G  Resp: CTAB, No Wh/Rh/Ra  Abdomen: Surgical site in RLQ mucoid drainage. RLQ drain with mixed bloody/purulent drainage. mild-mod tenderness in RLQ. No rigidity.   MSK: No LE edema. No Calf tenderness  : No martinez  Skin: No rashes or lesions. Skin is warm and dry to the touch.   Neuro: Alert and Awake. CN 2-12 Grossly intact. Moves all four extremities spontaneously.  Psych: Calm, Pleasant, Cooperative                          8.5    29.86 )-----------( 409      ( 08 Jan 2020 09:04 )             25.3       01-08    135  |  94<L>  |  26<H>  ----------------------------<  95  4.7   |  24  |  5.13<H>    Ca    8.4      08 Jan 2020 00:29  Phos  4.5     01-08  Mg     1.9     01-08    TPro  5.9<L>  /  Alb  2.4<L>  /  TBili  1.0  /  DBili  0.4<H>  /  AST  21  /  ALT  6<L>  /  AlkPhos  184<H>  01-08          MICROBIOLOGY:  Vancomycin Level, Random: 8.9 ug/mL (01-08-20 @ 00:29)  v  .Blood Blood-Venous  01-07-20   No growth to date.  --  --      .Urine Catheterized  01-07-20   <10,000 CFU/mL Normal Urogenital Gillian  --  --      Abdominal Fl Abdominal Fluid  01-06-20   No growth  --    No polymorphonuclear leukocytes seen  No organisms seen  by cytocentrifuge      .Urine Clean Catch (Midstream)  01-05-20   >100,000 CFU/ml Enterobacter cloacae  --  Enterobacter cloacae      .Blood Blood  01-04-20   No growth to date.  --  --      .Tissue Other  01-01-20   Numerous Enterobacter cloacae complex  --  Enterobacter cloacae complex      .Surgical Swab collection around right kidney  01-01-20   Moderate Enterobacter cloacae complex  --  Enterobacter cloacae complex      .Body Fluid periphrenic collection  01-01-20   Moderate Escherichia coli  Moderate Enterobacter cloacae (Carbapenem Resistant)  --  Escherichia coli  Enterobacter cloacae (Carbapenem Resistant)      .Surgical Swab right kidney abscess  01-01-20   Moderate Enterobacter cloacae complex  --  Enterobacter cloacae complex      .Urine Clean Catch (Midstream)  12-29-19   >100,000 CFU/ml Enterobacter cloacae  --  Enterobacter cloacae      .Blood Blood-Peripheral  12-27-19   No growth at 5 days.  --  --      .Stool Feces  12-14-19   No enteric pathogens isolated.  (Stool culture examined for Salmonella,  Shigella, Campylobacter, Aeromonas, Plesiomonas,  Vibrio, E.coli O157 and Yersinia)  --  --      .Stool moriah johnie  12-14-19   GI PCR Results: NOT detected  *******Please Note:*******  GI panel PCR evaluates for:  Campylobacter, Plesiomonas shigelloides, Salmonella,  Vibrio, Yersinia enterocolitica, Enteroaggregative  Escherichia coli (EAEC), Enteropathogenic E.coli (EPEC),  Enterotoxigenic E. coli (ETEC) lt/st, Shiga-like  toxin-producing E. coli (STEC) stx1/stx2,  Shigella/ Enteroinvasive E. coli (EIEC), Cryptosporidium,  Cyclospora cayetanensis, Entamoeba histolytica,  Giardia lamblia, Adenovirus F 40/41, Astrovirus,  Norovirus GI/GII, Rotavirus A, Sapovirus  --  --      .Nose Nose  12-10-19   Few Methicillin Sensitive Staph aureus "This can represent normal nasal  carriage.  PCR is more sensitive for identifying MRSA/MSSA carriage"  --  --                RADIOLOGY:    EXAM:  DUPLEX EXT VEINS LOWER RT                        PROCEDURE DATE:  01/08/2020    There is acute above-the-knee thrombus affecting the right external iliac, common femoral and femoral veins.  There is acute calf vein thrombus affecting the right soleal vein.    EXAM:  XR CHEST PORTABLE URGENT 1V                        PROCEDURE DATE:  01/07/2020    The heart is normal in size. The lungs are clear. No change in the appearance of the chest when compared to previous study done December 27, 2019. No pleural effusion. No pneumothorax.    EXAM:  US KIDNEY TRANSPLANT W DOPP RT                        PROCEDURE DATE:  01/07/2020    Perinephric hematoma not significantly changed in size with decreased echogenicity likely related to liquefaction.  Peak systolic velocity at the transplant renal artery anastomosis has decreased from 444 cm to 281 cm/s since 12/31/2019. Continued follow-up is recommended.

## 2020-01-08 NOTE — PROGRESS NOTE ADULT - ATTENDING COMMENTS
Evaluated in round  Low HCT have been stable after transfusion  Urology called to DC CBI  DC IVF, PO  Abx Amikacin added to Meropenem for double coverage of Enterobacter in urine  Duplex of LE to r/u DVT  Transplant medicine    Discussed with Dr Poe and transplant team

## 2020-01-08 NOTE — CONSULT NOTE ADULT - SUBJECTIVE AND OBJECTIVE BOX
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 Young, followed by Dr. Raphael, [facility] [place] Oncologist), RA with hand contractures. He underwent DDRT on 12/8/19 (ureteral stent sutured to Martinez - removed 12/15). Post op course c/b DGF requiring HD, thrombocytopenia, elevated LDH and Haptoglobulin. Schistocytes  on peripheral smear concerning for TMA. Envarsus held. Received Belatacept 12/13. Started on PLEX (12/12, 12/15). Underwent renal bx on 12/13 c/w profound ATN.  Found to have much improvement to levels, likely related to Tacrolimus. He was discharged home on 12/20/19 w/ outpatient HD.     He was now sent to ED by dialysis center after he was found to be febrile to 102, he did not get HD this AM, and last full HD session was on 12/26. Upon arrival, he was febrile to 101.7, other vitals were stable. He states that he had a cough that began 5-6 days ago that has now mostly resolved. He reports no other febrile episodes other than this morning. He admits to having one SOB episode earlier this week that resolved after he got HD. He also states that his mother was hospitalized earlier this week and was flu+. Currently makes less than 1 cup urine/daily. He currently denies SOB, CP, dyspnea, HA, dizziness, N/V, diarrhea, constipation.     1/1/2020 was taken to OR and found to have a perinephric hematoma. Underwent washout, evacuation of hematoma, and biopsy of transplanted kidney. He was recovering on the floor when overnight he spiked a fever to 38.1 and had 18 beats of wide complex tachycardia. He was also found to have worsening leukocytosis and became acutely hypotensive. A CT scan was obtained demonstrating a 4.5x3.7 perinephric collection. IR was consulted for drainage and SICU was consulted for hemodynamic monitoring.    Underwent IR drainage c/b hematuria requiring CBI. Patient was noted to have RLE edema so a duplex was obtained demonstrating a right external iliac and common femoral vein DVT. Patient was started on a heparin gtt and vascular surgery was consulted for further recommendations.      Recipient info:   CPRA:    0%  ABO:      A+  CMVAb:  Positive  EBVIgG Status:  Positive  Last HD:  12/7/2019    Donor ID:  [id number]  Match: [id number]  OPO: NYRT  Age:  49  ABO:  A  KDPI 81%  COD:  Anoxia  X Clamp Time:  12/7/2019 1538.  Medical Hx: DM, HTN, HLD, obesity BMI 53, CAD, CABAGX3  Terminal Cr:  1/1.8/1.7  CMV- Neg EBV- Neg    OR:    DDRT to right iliac fossa. Simulect induction. Two arteries anastomosed to a single cuff on the back table. One vein, one ureter. Ureteral anastomosis performed over a double J stent, which was sutured to the martinez.   Cold ischemia time 25.5 hours. (27 Dec 2019 10:45)      PAST MEDICAL HISTORY:  Erectile dysfunction  Glaucoma  Gout  HTN (hypertension)  ESRD (end stage renal disease) on dialysis  Contracture of finger joint  Carpal tunnel syndrome  Brain cyst  Anemia  Renal insufficiency  Gastric ulcer  Rheumatoid arthritis      PAST SURGICAL HISTORY:  Renal transplant recipient  Breakdown (mechanical) of penile (implanted) prosthesis, sequela  End-stage renal disease (ESRD)  Abscess of left buttock  AV fistula  S/P cystoscopy  s/p Lt Carpal tunnel  Rheumatoid arthritis      MEDICATIONS:  acetaminophen   Tablet .. 650 milliGRAM(s) Oral every 6 hours PRN  allopurinol 100 milliGRAM(s) Oral daily  brimonidine 0.2% Ophthalmic Solution 1 Drop(s) Both EYES three times a day  chlorhexidine 2% Cloths 1 Application(s) Topical <User Schedule>  famotidine    Tablet 20 milliGRAM(s) Oral daily  fluconAZOLE   Tablet 400 milliGRAM(s) Oral daily  gabapentin 100 milliGRAM(s) Oral two times a day  heparin  Infusion 1000 Unit(s)/Hr IV Continuous <Continuous>  latanoprost 0.005% Ophthalmic Solution 1 Drop(s) Both EYES daily  lidocaine 2% Jelly 1 milliLiter(s) IntraUrethral two times a day PRN  meropenem  IVPB 500 milliGRAM(s) IV Intermittent every 24 hours  oxybutynin 10 milliGRAM(s) Oral every 8 hours  polyethylene glycol 3350 17 Gram(s) Oral daily  predniSONE   Tablet 5 milliGRAM(s) Oral daily  senna 2 Tablet(s) Oral at bedtime  traMADol 50 milliGRAM(s) Oral every 6 hours PRN  traMADol 25 milliGRAM(s) Oral every 6 hours PRN  trimethoprim   80 mG/sulfamethoxazole 400 mG 1 Tablet(s) Oral daily  valGANciclovir 450 milliGRAM(s) Oral <User Schedule>      ALLERGIES:  coconut (Anaphylaxis)  morphine (Pruritus; Rash)      VITALS & I/Os:  Vital Signs Last 24 Hrs  T(C): 37 (08 Jan 2020 15:00), Max: 37.5 (08 Jan 2020 11:00)  T(F): 98.6 (08 Jan 2020 15:00), Max: 99.5 (08 Jan 2020 11:00)  HR: 79 (08 Jan 2020 18:00) (73 - 88)  BP: 119/69 (08 Jan 2020 18:00) (90/55 - 161/80)  BP(mean): 88 (08 Jan 2020 18:00) (65 - 110)  RR: 21 (08 Jan 2020 18:00) (9 - 29)  SpO2: 100% (08 Jan 2020 18:00) (66% - 100%)    I&O's Summary    07 Jan 2020 07:01  -  08 Jan 2020 07:00  --------------------------------------------------------  IN: 93346 mL / OUT: 1300 mL / NET: 53640 mL    08 Jan 2020 07:01  -  08 Jan 2020 18:16  --------------------------------------------------------  IN: 9530 mL / OUT: 4030 mL / NET: 5500 mL        PHYSICAL EXAM:  General: No acute distress  Respiratory: Nonlabored  Cardiovascular: RRR  Abdominal: Soft, nondistended, appropriately tender, incision dressed, drain in place  Extremities: b/l LE edema, +PT/DP signals b/l    LABS:                        8.5    29.86 )-----------( 409      ( 08 Jan 2020 09:04 )             25.3     01-08    135  |  94<L>  |  26<H>  ----------------------------<  95  4.7   |  24  |  5.13<H>    Ca    8.4      08 Jan 2020 00:29  Phos  4.5     01-08  Mg     1.9     01-08    TPro  5.9<L>  /  Alb  2.4<L>  /  TBili  1.0  /  DBili  0.4<H>  /  AST  21  /  ALT  6<L>  /  AlkPhos  184<H>  01-08    Lactate:  01-08 @ 01:39  1.2    PT/INR - ( 08 Jan 2020 00:29 )   PT: 14.9 sec;   INR: 1.30 ratio         PTT - ( 08 Jan 2020 00:29 )  PTT:26.8 sec      IMAGING:  < from: [facility] Duplex Lower Ext Vein Scan, Right (01.08.20 @ 14:14) >  EXAM:  DUPLEX EXT VEINS LOWER RT                            PROCEDURE DATE:  01/08/2020            INTERPRETATION:  CLINICAL INFORMATION: Right lower extremity swelling. Patient is status post kidney transplant.    COMPARISON: A prior study, dated 1/4/2020, showed no DVT.    TECHNIQUE: Duplex sonography of the RIGHT LOWER extremity veins with color and spectral Doppler, with and without compression.      FINDINGS: There is an enlarged right inguinal lymph node.    There is acute, echogenic, partially occlusive DVT affecting the right external iliac vein and common femoral vein.    There is occlusive thrombus affecting the right femoral vein in the proximal thigh.    The right popliteal vein is patent and free of thrombus.    The right posterior tibial and peroneal veins are patent in the calf.    There is acute DVT affecting the right soleal vein in the calf.    The contralateral common femoral vein is patent.      IMPRESSION:     There is acute above-the-knee thrombus affecting the right external iliac, common femoral and femoral veins.    There is acute calf vein thrombus affecting the right soleal vein.    Dr. Chang MORGAN M.D., RADIOLOGY RESIDENT  This document has been electronically signed.  ERON FAIRBANKS M.D., ATTENDING RADIOLOGIST  This document has been electronically signed. Jan 8 2020  2:57PM    < end of copied text >

## 2020-01-08 NOTE — CONSULT NOTE ADULT - ASSESSMENT
49y Male with history of ESRD on HD  (via LUE AVF), HTN, Gout, Glaucoma, PNET s/p robotic distal panc/splenectomy (at [place] 2015 by Dr. Young, followed by Dr. Raphael, [facility] Oncologist), RA with hand contractures who underwent DDRT on 12/8/19 (ureteral stent sutured to Kennedy - removed 12/15). Post op course c/b DGF requiring HD and TMA. Underwent renal bx on 12/13 c/w profound ATN. 1/1/2020 was taken to OR and found to have a perinephric hematoma. Underwent washout, evacuation of hematoma, and biopsy of transplanted kidney. POD4 a CT scan was obtained demonstrating a 4.5x3.7 perinephric collection. IR was consulted for drainage and SICU was consulted for hemodynamic monitoring. 1/8 found to have RLE DVT involving ext iliac, common fem veins.    - Continue therapeutic hep gtt  - Further recs pending    To be d/w Dr. Carson by Vascular Surgery team

## 2020-01-08 NOTE — PROGRESS NOTE ADULT - PROBLEM SELECTOR PLAN 1
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 tacrolimus) requiring HD, PLEX 12/12 and plasmapheresis 12/14/19. Switched tacrolimus to belatacept.    - Biopsy in OR (1/1/20) revealed ATN, no evidence of rejection.  - Had infected collection washed out, however developed fever and tachyarrhythmia, abdominal pain. Found to have diffusely enlarging perinephric fluid collection on CT imaging. Pt. now s/p CT guided drainage of fluid by IR team on 1/6/20.

## 2020-01-08 NOTE — PROGRESS NOTE ADULT - SUBJECTIVE AND OBJECTIVE BOX
[facility] -- Melissa Kelsey, Maximino Nunez, Hamzah Funes Savella  Office # [phone]      Follow Up:  AF    Subjective/Observations: Patient seen and examined. Events noted. Resting comfortably in bed. No complaints of chest pain, dyspnea, or palpitations reported.  Feeling better. HAd AF on tele last night now in SR.       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  Carpal tunnel syndrome  Brain cyst: had MRI recently- now gone  Anemia  Gastric ulcer: Long time ago  Rheumatoid arthritis  Renal transplant recipient  Breakdown (mechanical) of penile (implanted) prosthesis, sequela  End-stage renal disease (ESRD): PERMACATH PLACEMENT  Abscess of left buttock: s/p I &amp; D  AV fistula: placement left lower arm  S/P cystoscopy: stent placement &amp; removal for kidney stones, lithiotripsy  s/p Lt Carpal tunnel: 2011      MEDICATIONS  (STANDING):  allopurinol 100 milliGRAM(s) Oral daily  amiKACIN  IVPB 240 milliGRAM(s) IV Intermittent once  brimonidine 0.2% Ophthalmic Solution 1 Drop(s) Both EYES three times a day  chlorhexidine 2% Cloths 1 Application(s) Topical <User Schedule>  famotidine    Tablet 20 milliGRAM(s) Oral daily  fluconAZOLE   Tablet 400 milliGRAM(s) Oral daily  gabapentin 100 milliGRAM(s) Oral two times a day  latanoprost 0.005% Ophthalmic Solution 1 Drop(s) Both EYES daily  meropenem  IVPB 500 milliGRAM(s) IV Intermittent every 24 hours  oxybutynin 10 milliGRAM(s) Oral every 8 hours  polyethylene glycol 3350 17 Gram(s) Oral daily  predniSONE   Tablet 5 milliGRAM(s) Oral daily  senna 2 Tablet(s) Oral at bedtime  trimethoprim   80 mG/sulfamethoxazole 400 mG 1 Tablet(s) Oral daily  valGANciclovir 450 milliGRAM(s) Oral <User Schedule>    MEDICATIONS  (PRN):  acetaminophen   Tablet .. 650 milliGRAM(s) Oral every 6 hours PRN Mild Pain (1 - 3)  lidocaine 2% Jelly 1 milliLiter(s) IntraUrethral two times a day PRN irritation  traMADol 50 milliGRAM(s) Oral every 6 hours PRN Severe Pain (7 - 10)  traMADol 25 milliGRAM(s) Oral every 6 hours PRN Moderate Pain (4 - 6)      Allergies    coconut (Anaphylaxis)  morphine (Pruritus; Rash)    Intolerances            Vital Signs Last 24 Hrs  T(C): 37.1 (08 Jan 2020 07:00), Max: 37.1 (07 Jan 2020 23:00)  T(F): 98.8 (08 Jan 2020 07:00), Max: 98.8 (07 Jan 2020 23:00)  HR: 74 (08 Jan 2020 09:00) (67 - 88)  BP: 107/65 (08 Jan 2020 09:00) (90/55 - 174/97)  BP(mean): 81 (08 Jan 2020 09:00) (65 - 129)  RR: 22 (08 Jan 2020 09:00) (8 - 29)  SpO2: 100% (08 Jan 2020 09:00) (63% - 100%)    I&O's Summary    07 Jan 2020 07:01  -  08 Jan 2020 07:00  --------------------------------------------------------  IN: 27525 mL / OUT: 1300 mL / NET: 26556 mL    08 Jan 2020 07:01  -  08 Jan 2020 10:00  --------------------------------------------------------  IN: 3200 mL / OUT: 0 mL / NET: 3200 mL          PHYSICAL EXAM:  TELE: AF --> SR  Constitutional: NAD, awake    HEENT: Moist Mucous Membranes, Anicteric  Pulmonary: Decreased breath sounds b/l. No rales, crackles or wheeze appreciated.   Cardiovascular: Regular, S1 and S2, No murmurs, rubs, gallops or clicks  Gastrointestinal: Bowel Sounds present, soft, nontender.   Lymph: + peripheral edema. No lymphadenopathy.  Skin: No visible rashes or ulcers.  Psych:  Mood & affect appropriate for situation    LABS: All Labs Reviewed:                        8.5    29.86 )-----------( 409      ( 08 Jan 2020 09:04 )             25.3                         9.0    33.22 )-----------( 388      ( 08 Jan 2020 00:29 )             25.5                         8.6    31.61 )-----------( 404      ( 07 Jan 2020 18:26 )             25.4     08 Jan 2020 00:29    135    |  94     |  26     ----------------------------<  95     4.7     |  24     |  5.13   07 Jan 2020 00:39    132    |  93     |  43     ----------------------------<  107    4.3     |  20     |  7.39   06 Jan 2020 20:45    130    |  92     |  41     ----------------------------<  135    4.5     |  20     |  7.06     Ca    8.4        08 Jan 2020 00:29  Ca    8.3        07 Jan 2020 00:39  Ca    8.4        06 Jan 2020 20:45  Phos  4.5       08 Jan 2020 00:29  Phos  6.6       07 Jan 2020 00:39  Phos  5.9       06 Jan 2020 20:45  Mg     1.9       08 Jan 2020 00:29  Mg     2.0       07 Jan 2020 00:39  Mg     2.0       06 Jan 2020 20:45    TPro  5.9    /  Alb  2.4    /  TBili  1.0    /  DBili  0.4    /  AST  21     /  ALT  6      /  AlkPhos  184    08 Jan 2020 00:29  TPro  6.2    /  Alb  2.7    /  TBili  0.6    /  DBili  0.3    /  AST  8      /  ALT  5      /  AlkPhos  194    07 Jan 2020 00:39  TPro  6.3    /  Alb  2.8    /  TBili  0.6    /  DBili  0.3    /  AST  16     /  ALT  6      /  AlkPhos  201    06 Jan 2020 20:45    PT/INR - ( 08 Jan 2020 00:29 )   PT: 14.9 sec;   INR: 1.30 ratio         PTT - ( 08 Jan 2020 00:29 )  PTT:26.8 sec

## 2020-01-08 NOTE — PROGRESS NOTE ADULT - SUBJECTIVE AND OBJECTIVE BOX
Interventional Radiology Follow- Up Note    49y Male hx of renal transplant on 12/8/19 s/p Exploration of Araceli-graft hematoma, washout of kidney graft, doppler ultrasound evaluation of kidney graft and kidney biopsy s/p pelvic fluid aspiration on 1/6  in Interventional Radiology with Dr. Bro. Pt had red colored urine s/p procedure now on CBI. Kennedy bag with light red colored urine. 3 units of PRBC transfused yesterday. Reports pain when passing clots and RLQ abdominal pain.      Vitals: T(F): 99.5 (01-08-20 @ 11:00), Max: 99.5 (01-08-20 @ 11:00)  HR: 76 (01-08-20 @ 11:00) (67 - 88)  BP: 104/61 (01-08-20 @ 11:00) (90/55 - 174/97)  RR: 21 (01-08-20 @ 11:00) (8 - 29)  SpO2: 100% (01-08-20 @ 11:00) (63% - 100%)  Wt(kg): --    LABS:                        8.5    29.86 )-----------( 409      ( 08 Jan 2020 09:04 )             25.3     01-08    135  |  94<L>  |  26<H>  ----------------------------<  95  4.7   |  24  |  5.13<H>    Ca    8.4      08 Jan 2020 00:29  Phos  4.5     01-08  Mg     1.9     01-08    TPro  5.9<L>  /  Alb  2.4<L>  /  TBili  1.0  /  DBili  0.4<H>  /  AST  21  /  ALT  6<L>  /  AlkPhos  184<H>  01-08    PT/INR - ( 08 Jan 2020 00:29 )   PT: 14.9 sec;   INR: 1.30 ratio         PTT - ( 08 Jan 2020 00:29 )  PTT:26.8 sec      PHYSICAL EXAM:  General: Nontoxic, in NAD  Neuro:  Alert & oriented x 3  Abdomen: soft, RLQ staples in place. Dressing with dry light red blood. NO active bleeding. (+) ttp RLQ.     Impression: 49y Male hx of renal transplant on 12/8/19 s/p Exploration of Araceli-graft hematoma, washout of kidney graft, doppler ultrasound evaluation of kidney graft and kidney biopsy s/p pelvic fluid aspiration on 1/6  in Interventional Radiology with Dr. Bro. H&H stable.     Plan:  - Care per transplant team and SICU.  - Will discuss with IR attending.     Please call IR at extension [phone] with any questions, concerns, or issues regarding above. Interventional Radiology Follow- Up Note    49y Male hx of renal transplant on 12/8/19 s/p Exploration of Araceli-graft hematoma, washout of kidney graft, doppler ultrasound evaluation of kidney graft and kidney biopsy s/p pelvic fluid aspiration on 1/6  in Interventional Radiology with Dr. Bro. Pt had red colored urine s/p procedure now on CBI. Kennedy bag with light red colored urine. 3 units of PRBC transfused yesterday. Reports pain when passing clots and RLQ abdominal pain.      Vitals: T(F): 99.5 (01-08-20 @ 11:00), Max: 99.5 (01-08-20 @ 11:00)  HR: 76 (01-08-20 @ 11:00) (67 - 88)  BP: 104/61 (01-08-20 @ 11:00) (90/55 - 174/97)  RR: 21 (01-08-20 @ 11:00) (8 - 29)  SpO2: 100% (01-08-20 @ 11:00) (63% - 100%)  Wt(kg): --    LABS:                        8.5    29.86 )-----------( 409      ( 08 Jan 2020 09:04 )             25.3     01-08    135  |  94<L>  |  26<H>  ----------------------------<  95  4.7   |  24  |  5.13<H>    Ca    8.4      08 Jan 2020 00:29  Phos  4.5     01-08  Mg     1.9     01-08    TPro  5.9<L>  /  Alb  2.4<L>  /  TBili  1.0  /  DBili  0.4<H>  /  AST  21  /  ALT  6<L>  /  AlkPhos  184<H>  01-08    PT/INR - ( 08 Jan 2020 00:29 )   PT: 14.9 sec;   INR: 1.30 ratio         PTT - ( 08 Jan 2020 00:29 )  PTT:26.8 sec    Culture - Body Fluid with Gram Stain (01.06.20 @ 21:03)    Gram Stain:   No polymorphonuclear leukocytes seen  No organisms seen  by cytocentrifuge    Specimen Source: Abdominal Fl Abdominal Fluid    Culture Results:   No growth      PHYSICAL EXAM:  General: Nontoxic, in NAD  Neuro:  Alert & oriented x 3  Abdomen: soft, RLQ staples in place. Dressing with dry light red blood. NO active bleeding. (+) ttp RLQ.     Impression: 49y Male hx of renal transplant on 12/8/19 s/p Exploration of Araceli-graft hematoma, washout of kidney graft, doppler ultrasound evaluation of kidney graft and kidney biopsy s/p pelvic fluid aspiration on 1/6  in Interventional Radiology with Dr. Bro. H&H stable.     Plan:  - Care per transplant team and SICU.  - Will discuss with IR attending.     Please call IR at extension [phone] with any questions, concerns, or issues regarding above.

## 2020-01-08 NOTE — PROGRESS NOTE ADULT - ASSESSMENT
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     RVP with Influenza A.   CXR Clear  s/p Tamiflu treatment course    UCx with > 100K Enterobacter  s/p Ex-Lap and washout of infected hematoma on 1/1  Growth of Enterobacter from surgical cultures  2 of the isolates intermediate/resistant to Ertapenem but rest susceptible  Unclear significance of this - would continue meropenem at this time    s/p IR guided drainage attempt of hematoma on 1/6  Significant hematuria with clots after procedure  Rise in leukocytosis over last 48 hours  Vancomycin and fluconazole added on 1/5    Would give dose of Amikacin today given significant rise in WBC  If no new growth on cultures tomorrow can add Ciprofloxacin to regimen (Enterobacter susceptible)  I suspect deterioration is due to manipulation of hematoma and incomplete source control    Overall, Leukocytosis (rising), Infected Hematoma, Positive Urine Culture, Influenza Infection, Fever, Renal Transplant Recipient    --Recommend Amikacin 3.75 mg/kg IV x1 (given dose yesterday prior to dialysis)  --Continue Meropenem 500 mg IV Q24H  --If no further new growth on cultures can likely add Ciprofloxacin tomorrow to regimen.  --Can continue fluconazole but suspicion is low for fungal infection  --Continue to follow CBC with diff  --Continue to follow temperature curve  --Follow up on surgical swab culture from 1/5 and IR drainage 1/6  --Follow up on prelim blood cultures    I will continue to follow. Please feel free to contact me with any further questions.    Dada Jacobo M.D.  [facility] Division of Infectious Disease  8AM-5PM: Pager Number [phone]  After Hours (or if no response): Please contact the Infectious Diseases Office at [phone]

## 2020-01-08 NOTE — CHART NOTE - NSCHARTNOTEFT_GEN_A_CORE
I personally saw and examined patient together with Ms. Gibson.  Patient complained of suprapublic pain.  On physical exam could not identify any masses or lesions.  Will ask Urology's opinion (following him continuous bladder irrigation).  Informed patient of finding of DVT and plan to anticoagulate..

## 2020-01-09 ENCOUNTER — TRANSCRIPTION ENCOUNTER (OUTPATIENT)
Age: 50
End: 2020-01-09

## 2020-01-09 ENCOUNTER — RESULT REVIEW (OUTPATIENT)
Age: 50
End: 2020-01-09

## 2020-01-09 DIAGNOSIS — E87.1 HYPO-OSMOLALITY AND HYPONATREMIA: ICD-10-CM

## 2020-01-09 LAB
ALBUMIN SERPL ELPH-MCNC: 2.5 G/DL — LOW (ref 3.3–5)
ALP SERPL-CCNC: 183 U/L — HIGH (ref 40–120)
ALT FLD-CCNC: <5 U/L — LOW (ref 10–45)
ANION GAP SERPL CALC-SCNC: 16 MMOL/L — SIGNIFICANT CHANGE UP (ref 5–17)
ANION GAP SERPL CALC-SCNC: 16 MMOL/L — SIGNIFICANT CHANGE UP (ref 5–17)
ANION GAP SERPL CALC-SCNC: 17 MMOL/L — SIGNIFICANT CHANGE UP (ref 5–17)
APTT BLD: 39.5 SEC — HIGH (ref 27.5–36.3)
APTT BLD: 55.3 SEC — HIGH (ref 27.5–36.3)
APTT BLD: 61.8 SEC — HIGH (ref 27.5–36.3)
AST SERPL-CCNC: 16 U/L — SIGNIFICANT CHANGE UP (ref 10–40)
BASOPHILS # BLD AUTO: 0.25 K/UL — HIGH (ref 0–0.2)
BASOPHILS NFR BLD AUTO: 0.9 % — SIGNIFICANT CHANGE UP (ref 0–2)
BILIRUB DIRECT SERPL-MCNC: 0.4 MG/DL — HIGH (ref 0–0.2)
BILIRUB INDIRECT FLD-MCNC: 0.4 MG/DL — SIGNIFICANT CHANGE UP (ref 0.2–1)
BILIRUB SERPL-MCNC: 0.8 MG/DL — SIGNIFICANT CHANGE UP (ref 0.2–1.2)
BLD GP AB SCN SERPL QL: NEGATIVE — SIGNIFICANT CHANGE UP
BUN SERPL-MCNC: 37 MG/DL — HIGH (ref 7–23)
BUN SERPL-MCNC: 38 MG/DL — HIGH (ref 7–23)
BUN SERPL-MCNC: 40 MG/DL — HIGH (ref 7–23)
CALCIUM SERPL-MCNC: 8.6 MG/DL — SIGNIFICANT CHANGE UP (ref 8.4–10.5)
CALCIUM SERPL-MCNC: 8.6 MG/DL — SIGNIFICANT CHANGE UP (ref 8.4–10.5)
CALCIUM SERPL-MCNC: 8.8 MG/DL — SIGNIFICANT CHANGE UP (ref 8.4–10.5)
CHLORIDE SERPL-SCNC: 88 MMOL/L — LOW (ref 96–108)
CHLORIDE SERPL-SCNC: 88 MMOL/L — LOW (ref 96–108)
CHLORIDE SERPL-SCNC: 91 MMOL/L — LOW (ref 96–108)
CO2 SERPL-SCNC: 22 MMOL/L — SIGNIFICANT CHANGE UP (ref 22–31)
CO2 SERPL-SCNC: 22 MMOL/L — SIGNIFICANT CHANGE UP (ref 22–31)
CO2 SERPL-SCNC: 23 MMOL/L — SIGNIFICANT CHANGE UP (ref 22–31)
CREAT ?TM UR-MCNC: <2 MG/DL — SIGNIFICANT CHANGE UP
CREAT SERPL-MCNC: 6.57 MG/DL — HIGH (ref 0.5–1.3)
CREAT SERPL-MCNC: 6.6 MG/DL — HIGH (ref 0.5–1.3)
CREAT SERPL-MCNC: 6.71 MG/DL — HIGH (ref 0.5–1.3)
CULTURE RESULTS: SIGNIFICANT CHANGE UP
CULTURE RESULTS: SIGNIFICANT CHANGE UP
EOSINOPHIL # BLD AUTO: 0 K/UL — SIGNIFICANT CHANGE UP (ref 0–0.5)
EOSINOPHIL NFR BLD AUTO: 0 % — SIGNIFICANT CHANGE UP (ref 0–6)
GAS PNL BLDA: SIGNIFICANT CHANGE UP
GLUCOSE SERPL-MCNC: 71 MG/DL — SIGNIFICANT CHANGE UP (ref 70–99)
GLUCOSE SERPL-MCNC: 75 MG/DL — SIGNIFICANT CHANGE UP (ref 70–99)
GLUCOSE SERPL-MCNC: 85 MG/DL — SIGNIFICANT CHANGE UP (ref 70–99)
HCT VFR BLD CALC: 23.5 % — LOW (ref 39–50)
HCT VFR BLD CALC: 24.3 % — LOW (ref 39–50)
HCT VFR BLD CALC: 25.7 % — LOW (ref 39–50)
HGB BLD-MCNC: 8.2 G/DL — LOW (ref 13–17)
HGB BLD-MCNC: 8.4 G/DL — LOW (ref 13–17)
HGB BLD-MCNC: 8.6 G/DL — LOW (ref 13–17)
INR BLD: 1.08 RATIO — SIGNIFICANT CHANGE UP (ref 0.88–1.16)
LYMPHOCYTES # BLD AUTO: 0.71 K/UL — LOW (ref 1–3.3)
LYMPHOCYTES # BLD AUTO: 2.6 % — LOW (ref 13–44)
MAGNESIUM SERPL-MCNC: 2.5 MG/DL — SIGNIFICANT CHANGE UP (ref 1.6–2.6)
MAGNESIUM SERPL-MCNC: 2.6 MG/DL — SIGNIFICANT CHANGE UP (ref 1.6–2.6)
MAGNESIUM SERPL-MCNC: 2.6 MG/DL — SIGNIFICANT CHANGE UP (ref 1.6–2.6)
MCHC RBC-ENTMCNC: 30.7 PG — SIGNIFICANT CHANGE UP (ref 27–34)
MCHC RBC-ENTMCNC: 30.9 PG — SIGNIFICANT CHANGE UP (ref 27–34)
MCHC RBC-ENTMCNC: 31 PG — SIGNIFICANT CHANGE UP (ref 27–34)
MCHC RBC-ENTMCNC: 33.5 GM/DL — SIGNIFICANT CHANGE UP (ref 32–36)
MCHC RBC-ENTMCNC: 34.6 GM/DL — SIGNIFICANT CHANGE UP (ref 32–36)
MCHC RBC-ENTMCNC: 34.9 GM/DL — SIGNIFICANT CHANGE UP (ref 32–36)
MCV RBC AUTO: 88.7 FL — SIGNIFICANT CHANGE UP (ref 80–100)
MCV RBC AUTO: 89.7 FL — SIGNIFICANT CHANGE UP (ref 80–100)
MCV RBC AUTO: 91.8 FL — SIGNIFICANT CHANGE UP (ref 80–100)
MONOCYTES # BLD AUTO: 2.16 K/UL — HIGH (ref 0–0.9)
MONOCYTES NFR BLD AUTO: 7.9 % — SIGNIFICANT CHANGE UP (ref 2–14)
NEUTROPHILS # BLD AUTO: 23.95 K/UL — HIGH (ref 1.8–7.4)
NEUTROPHILS NFR BLD AUTO: 87.7 % — HIGH (ref 43–77)
NRBC # BLD: 0 /100 WBCS — SIGNIFICANT CHANGE UP (ref 0–0)
NRBC # BLD: 0 /100 WBCS — SIGNIFICANT CHANGE UP (ref 0–0)
OSMOLALITY UR: 286 MOS/KG — LOW (ref 300–900)
PHOSPHATE SERPL-MCNC: 5.8 MG/DL — HIGH (ref 2.5–4.5)
PHOSPHATE SERPL-MCNC: 6.3 MG/DL — HIGH (ref 2.5–4.5)
PHOSPHATE SERPL-MCNC: 6.7 MG/DL — HIGH (ref 2.5–4.5)
PLATELET # BLD AUTO: 453 K/UL — HIGH (ref 150–400)
PLATELET # BLD AUTO: 475 K/UL — HIGH (ref 150–400)
PLATELET # BLD AUTO: 489 K/UL — HIGH (ref 150–400)
POTASSIUM SERPL-MCNC: 4.4 MMOL/L — SIGNIFICANT CHANGE UP (ref 3.5–5.3)
POTASSIUM SERPL-MCNC: 4.5 MMOL/L — SIGNIFICANT CHANGE UP (ref 3.5–5.3)
POTASSIUM SERPL-MCNC: 5.8 MMOL/L — HIGH (ref 3.5–5.3)
POTASSIUM SERPL-SCNC: 4.4 MMOL/L — SIGNIFICANT CHANGE UP (ref 3.5–5.3)
POTASSIUM SERPL-SCNC: 4.5 MMOL/L — SIGNIFICANT CHANGE UP (ref 3.5–5.3)
POTASSIUM SERPL-SCNC: 5.8 MMOL/L — HIGH (ref 3.5–5.3)
PROT SERPL-MCNC: 6.1 G/DL — SIGNIFICANT CHANGE UP (ref 6–8.3)
PROTHROM AB SERPL-ACNC: 12.4 SEC — SIGNIFICANT CHANGE UP (ref 10–12.9)
RBC # BLD: 2.65 M/UL — LOW (ref 4.2–5.8)
RBC # BLD: 2.71 M/UL — LOW (ref 4.2–5.8)
RBC # BLD: 2.8 M/UL — LOW (ref 4.2–5.8)
RBC # FLD: 15.8 % — HIGH (ref 10.3–14.5)
RBC # FLD: 15.9 % — HIGH (ref 10.3–14.5)
RBC # FLD: 16 % — HIGH (ref 10.3–14.5)
RH IG SCN BLD-IMP: POSITIVE — SIGNIFICANT CHANGE UP
SODIUM SERPL-SCNC: 126 MMOL/L — LOW (ref 135–145)
SODIUM SERPL-SCNC: 128 MMOL/L — LOW (ref 135–145)
SODIUM SERPL-SCNC: 129 MMOL/L — LOW (ref 135–145)
SODIUM UR-SCNC: 155 MMOL/L — SIGNIFICANT CHANGE UP
SPECIMEN SOURCE: SIGNIFICANT CHANGE UP
SPECIMEN SOURCE: SIGNIFICANT CHANGE UP
VANCOMYCIN FLD-MCNC: 19.2 UG/ML — SIGNIFICANT CHANGE UP
WBC # BLD: 24.88 K/UL — HIGH (ref 3.8–10.5)
WBC # BLD: 27.31 K/UL — HIGH (ref 3.8–10.5)
WBC # BLD: 31.53 K/UL — HIGH (ref 3.8–10.5)
WBC # FLD AUTO: 24.88 K/UL — HIGH (ref 3.8–10.5)
WBC # FLD AUTO: 27.31 K/UL — HIGH (ref 3.8–10.5)
WBC # FLD AUTO: 31.53 K/UL — HIGH (ref 3.8–10.5)

## 2020-01-09 PROCEDURE — 49084 PERITONEAL LAVAGE: CPT | Mod: 78

## 2020-01-09 PROCEDURE — 36246 INS CATH ABD/L-EXT ART 2ND: CPT | Mod: RT

## 2020-01-09 PROCEDURE — 99232 SBSQ HOSP IP/OBS MODERATE 35: CPT | Mod: GC

## 2020-01-09 PROCEDURE — 50370 RMVL TRANSPLANTED RNL ALGRFT: CPT | Mod: 78

## 2020-01-09 PROCEDURE — 99291 CRITICAL CARE FIRST HOUR: CPT

## 2020-01-09 PROCEDURE — 99233 SBSQ HOSP IP/OBS HIGH 50: CPT

## 2020-01-09 PROCEDURE — 70450 CT HEAD/BRAIN W/O DYE: CPT | Mod: 26

## 2020-01-09 PROCEDURE — 10140 I&D HMTMA SEROMA/FLUID COLLJ: CPT | Mod: 78

## 2020-01-09 PROCEDURE — 99232 SBSQ HOSP IP/OBS MODERATE 35: CPT

## 2020-01-09 PROCEDURE — 76776 US EXAM K TRANSPL W/DOPPLER: CPT | Mod: 26,RT

## 2020-01-09 PROCEDURE — 37191 INS ENDOVAS VENA CAVA FILTR: CPT | Mod: 79

## 2020-01-09 PROCEDURE — 37244 VASC EMBOLIZE/OCCLUDE BLEED: CPT | Mod: 79

## 2020-01-09 RX ORDER — ACETAMINOPHEN 500 MG
1000 TABLET ORAL ONCE
Refills: 0 | Status: COMPLETED | OUTPATIENT
Start: 2020-01-09 | End: 2020-01-09

## 2020-01-09 RX ORDER — CIPROFLOXACIN LACTATE 400MG/40ML
400 VIAL (ML) INTRAVENOUS EVERY 24 HOURS
Refills: 0 | Status: DISCONTINUED | OUTPATIENT
Start: 2020-01-09 | End: 2020-01-16

## 2020-01-09 RX ORDER — SODIUM CHLORIDE 5 G/100ML
1000 INJECTION, SOLUTION INTRAVENOUS
Refills: 0 | Status: DISCONTINUED | OUTPATIENT
Start: 2020-01-09 | End: 2020-01-10

## 2020-01-09 RX ADMIN — Medication 1 TABLET(S): at 11:19

## 2020-01-09 RX ADMIN — TRAMADOL HYDROCHLORIDE 50 MILLIGRAM(S): 50 TABLET ORAL at 17:47

## 2020-01-09 RX ADMIN — Medication 5 MILLIGRAM(S): at 05:18

## 2020-01-09 RX ADMIN — FLUCONAZOLE 400 MILLIGRAM(S): 150 TABLET ORAL at 11:19

## 2020-01-09 RX ADMIN — Medication 10 MILLIGRAM(S): at 05:18

## 2020-01-09 RX ADMIN — TRAMADOL HYDROCHLORIDE 50 MILLIGRAM(S): 50 TABLET ORAL at 06:05

## 2020-01-09 RX ADMIN — Medication 100 MILLIGRAM(S): at 11:19

## 2020-01-09 RX ADMIN — FAMOTIDINE 20 MILLIGRAM(S): 10 INJECTION INTRAVENOUS at 11:19

## 2020-01-09 RX ADMIN — CHLORHEXIDINE GLUCONATE 1 APPLICATION(S): 213 SOLUTION TOPICAL at 05:17

## 2020-01-09 RX ADMIN — TRAMADOL HYDROCHLORIDE 50 MILLIGRAM(S): 50 TABLET ORAL at 05:31

## 2020-01-09 RX ADMIN — Medication 400 MILLIGRAM(S): at 17:26

## 2020-01-09 RX ADMIN — GABAPENTIN 100 MILLIGRAM(S): 400 CAPSULE ORAL at 05:17

## 2020-01-09 RX ADMIN — HEPARIN SODIUM 12 UNIT(S)/HR: 5000 INJECTION INTRAVENOUS; SUBCUTANEOUS at 05:17

## 2020-01-09 RX ADMIN — Medication 200 MILLIGRAM(S): at 10:31

## 2020-01-09 RX ADMIN — Medication 1000 MILLIGRAM(S): at 17:45

## 2020-01-09 RX ADMIN — SODIUM CHLORIDE 50 MILLILITER(S): 5 INJECTION, SOLUTION INTRAVENOUS at 10:31

## 2020-01-09 RX ADMIN — TRAMADOL HYDROCHLORIDE 50 MILLIGRAM(S): 50 TABLET ORAL at 18:17

## 2020-01-09 RX ADMIN — LATANOPROST 1 DROP(S): 0.05 SOLUTION/ DROPS OPHTHALMIC; TOPICAL at 11:19

## 2020-01-09 RX ADMIN — BRIMONIDINE TARTRATE 1 DROP(S): 2 SOLUTION/ DROPS OPHTHALMIC at 05:17

## 2020-01-09 RX ADMIN — Medication 10 MILLIGRAM(S): at 13:23

## 2020-01-09 RX ADMIN — BRIMONIDINE TARTRATE 1 DROP(S): 2 SOLUTION/ DROPS OPHTHALMIC at 13:24

## 2020-01-09 NOTE — CHART NOTE - NSCHARTNOTEFT_GEN_A_CORE
Angiogram showed an actively bleeding pseudoaneurysm at (or within millimeters of) the anastomosis of the transplant renal artery with the recipient iliac artery.   A stent was placed in the iliac artery to control the hemorrhage.  I was present throughout the procedure and discussed the findings and approach with IR attending and Vascular Attending (Dr. Carson).  Plan to proceed with transplant nephrectomy.  I had previously consented patient.  I was not able to contact emergency contacts listed in EMR.  Given findings and need to proceed, will go to operating room.

## 2020-01-09 NOTE — PROGRESS NOTE ADULT - SUBJECTIVE AND OBJECTIVE BOX
Interventional Radiology Pre-Procedure Note    This is a 49y Male with a fever and continued large volume hematuria for several days.  Patient has new right sided above the knee DVT and is not a candidate for anticoagulation as a result of hematuria.    Procedure: Transplant renal artery angiogram, possible visceral artery angiogram, possible embolization, and IVC filter placement using imaging guidance.    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  Carpal tunnel syndrome  Brain cyst: had MRI recently- now gone  Anemia  Gastric ulcer: Long time ago  Rheumatoid arthritis  Renal transplant recipient  Breakdown (mechanical) of penile (implanted) prosthesis, sequela  End-stage renal disease (ESRD): PERMACATH PLACEMENT  Abscess of left buttock: s/p I &amp; D  AV fistula: placement left lower arm  S/P cystoscopy: stent placement &amp; removal for kidney stones, lithiotripsy  s/p Lt Carpal tunnel: 2011       CBC Full  -  ( 09 Jan 2020 16:46 )  WBC Count : 24.88 K/uL  RBC Count : 2.65 M/uL  Hemoglobin : 8.2 g/dL  Hematocrit : 23.5 %  Platelet Count - Automated : 489 K/uL  Mean Cell Volume : 88.7 fl  Mean Cell Hemoglobin : 30.9 pg  Mean Cell Hemoglobin Concentration : 34.9 gm/dL  Auto Neutrophil # : x  Auto Lymphocyte # : x  Auto Monocyte # : x  Auto Eosinophil # : x  Auto Basophil # : x  Auto Neutrophil % : x  Auto Lymphocyte % : x  Auto Monocyte % : x  Auto Eosinophil % : x  Auto Basophil % : x    01-09    129<L>  |  91<L>  |  40<H>  ----------------------------<  75  4.5   |  22  |  6.71<H>    Ca    8.6      09 Jan 2020 14:15  Phos  6.3     01-09  Mg     2.5     01-09    TPro  6.1  /  Alb  2.5<L>  /  TBili  0.8  /  DBili  0.4<H>  /  AST  16  /  ALT  <5<L>  /  AlkPhos  183<H>  01-09    PT/INR - ( 09 Jan 2020 03:52 )   PT: 12.4 sec;   INR: 1.08 ratio         PTT - ( 09 Jan 2020 16:13 )  PTT:39.5 sec    Procedure/ risks/ benefits were explained, informed consent obtained from patient, verbalizes understanding.

## 2020-01-09 NOTE — PROGRESS NOTE ADULT - ASSESSMENT
49 year old male with ESRD on HD, now s/p ddrt on 12/8/2019 course complicated by TMA/profound ATN, who presents from a HD center with a fever and cough. He was initially admitted with influenza. He is s/p washout of infected collection and biopsy which revealed ATN.   He is now found to have a diffusely enlarging perinephric fluid collection and is awaiting drainage.    While on telemetry, he had an episode of a wide complex tachycardia. It initially appears irregular, suggesting an atrial arrhythmia with aberrancy, though the remainder appears more like an episode of non-sustained VT.      - continue to watch on telemetry  - no additional arrhythmias in the past 24h.   - if tolerated by his BP, would restart his lopressor at 12.5 mg po bid.    - watch creatinine and electrolytes. Keep K>4, Mg>2  - check echocardiogram. Last echocardiogram in 1/2019 with normal LV function      - no sign of acute ischemia. He had a pharm stress test in 2018 that showed no evidence of ischemia  - s/p IR drainage.  Tolerated procedure well.    -now with dvt, started on heparin gtt  -labile hgb, trend carefully noting need for ac  -is at risk of abrupt decompensation noting multisystem abnormalities, wct, new dvt  - will follow with you

## 2020-01-09 NOTE — PROGRESS NOTE ADULT - ASSESSMENT
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 on 12/27 with cough and fever.     RVP with Influenza A.   CXR Clear  s/p Tamiflu treatment course    UCx with > 100K Enterobacter  s/p Ex-Lap and washout of infected hematoma on 1/1  Growth of Enterobacter from surgical cultures  2 of the isolates intermediate/resistant to Ertapenem but rest susceptible  Unclear significance of this - would continue meropenem at this time    s/p IR guided drainage attempt of hematoma on 1/6  Significant hematuria with clots after procedure  Vancomycin and fluconazole added on 1/5    I would add Ciprofloxacin for double coverage  I suspect deterioration is due to manipulation of hematoma and incomplete source control    Overall, Leukocytosis, Infected Hematoma, Positive Urine Culture, Influenza Infection, Fever, Renal Transplant Recipient    --Recommend Ciprofloxacin 400 mg IV Q24  --Continue Meropenem 500 mg IV Q24H  --Agree with plans for new US of Transplant kidney. Anticipate he will need further intervention for source control of infected hematoma.  --Can continue fluconazole but suspicion is low for fungal infection  --Continue to follow CBC with diff  --Continue to follow temperature curve  --Follow up on surgical swab culture from 1/5 and IR drainage 1/6  --Follow up on prelim blood cultures    I will continue to follow. Please feel free to contact me with any further questions.    Dada Jacobo M.D.  [facility] Division of Infectious Disease  8AM-5PM: Pager Number [phone]  After Hours (or if no response): Please contact the Infectious Diseases Office at [phone]

## 2020-01-09 NOTE — PROGRESS NOTE ADULT - SUBJECTIVE AND OBJECTIVE BOX
Transplant Surgery - Multidisciplinary Rounds  --------------------------------------------------------------  R DDRT    Date:  12/8/19  Dr. Espinal, readmitted 12/27/19 with +Flu/perinephretic collection  Ex Lap washout/ renal bx  Date:  1/1/20    Present:   Patient seen with multidisciplinary team including Transplant Surgeon: Dr. Poe, Dr. Velásquez, Dr. Espinal, Dr. Be, Transplant Nephrologist: Dr. Puckett, pharmacist CAROL Diaz, NP Aislinn Hunt and examined with Dr. Poe. Disciplines not in attendance will be notified of the plan.      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 Young, followed by Dr. Raphael, [facility] Oncologist), RA with hand contractures. 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 Post op course c/b DGF requiring HD, thrombocytopenia, elevated LDH and Haptoglobulin. Schistocytes  on peripheral smear concerning for TMA. Envarsus held. Received Belatacept 12/13. Started on PLEX (12/12, 12/15). Underwent renal bx on 12/13 c/w profound ATN.  Found to have much improvement to levels, likely related to Tacrolimus. He was discharged home on 12/20/19 w/ outpatient HD.     Re-admitted 12/27 with influenza, completed treatement with Tamiflu on 12/31. Urine cx on admission grew Ent cloacae, was started on meropenem 12/31.   Renal US on admission with increasing hematoma. OR on 1/1 for ex-lap, hematoma evacuation, washout and renal bx. OR cxs growing Ent. clocae.  Prelim bx results c/w ATN, no rejection.     cxs:   12/27: BC x2: negative  12/29: Urine Cx: Enterobacter Cloacae  1/1 OR Perinephric collection Cx:  Carbapenem resistant Ent Cloacae, E. Coli  1/1: R kidney abscess swab: Enterobacter Cloacae  1/1  OR tissue: Enterobacter Cloacae  1/4: BC x2: ngtd  1/5: Urine Cx: Enteropbacter Cloacae  1/5: Skin incision Cx (bedside):  ngtd  1/6: Adominal fluid Cx from IR:  ngtd  1/7: Urine Cx:  ngtd  1/7: BCX2:  ngtd      Interval events:    - Remains in SICU  - Afebrile TMax 37.7  BP range /50-90  - RNs noted disorientation overnight, none noted on exam this morning  - SCr 6.60, K+ 4.4, Na 126 from 135.  Last HD session 1/7.    30m UOP  - Stopped CBI x1 hour yesterday, but urine became dark red, so CBI was resumed  - Worsening RLE edema noted yesterday. Venous duplex demonstrated:  acute above-the-knee thrombus affecting the right external iliac, common femoral and femoral veins as well as acute calf vein thrombus affecting the right soleal vein.   Heparin drip started, PTT 61.8 on 1000U/hour     Potential Discharge date: pending clinical improvement       MEDICATIONS  (STANDING):  allopurinol 100 milliGRAM(s) Oral daily  brimonidine 0.2% Ophthalmic Solution 1 Drop(s) Both EYES three times a day  chlorhexidine 2% Cloths 1 Application(s) Topical <User Schedule>  ciprofloxacin   IVPB 400 milliGRAM(s) IV Intermittent every 24 hours  famotidine    Tablet 20 milliGRAM(s) Oral daily  fluconAZOLE   Tablet 400 milliGRAM(s) Oral daily  heparin  Infusion 1000 Unit(s)/Hr (12 mL/Hr) IV Continuous <Continuous>  latanoprost 0.005% Ophthalmic Solution 1 Drop(s) Both EYES daily  meropenem  IVPB 500 milliGRAM(s) IV Intermittent every 24 hours  oxybutynin 10 milliGRAM(s) Oral every 8 hours  polyethylene glycol 3350 17 Gram(s) Oral daily  predniSONE   Tablet 5 milliGRAM(s) Oral daily  senna 2 Tablet(s) Oral at bedtime  sodium chloride 2% . 1000 milliLiter(s) (50 mL/Hr) IV Continuous <Continuous>  trimethoprim   80 mG/sulfamethoxazole 400 mG 1 Tablet(s) Oral daily  valGANciclovir 450 milliGRAM(s) Oral <User Schedule>    MEDICATIONS  (PRN):  acetaminophen   Tablet .. 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  Carpal tunnel syndrome  Brain cyst: had MRI recently- now gone  Anemia  Gastric ulcer: Long time ago  Rheumatoid arthritis  Renal transplant recipient  Breakdown (mechanical) of penile (implanted) prosthesis, sequela  End-stage renal disease (ESRD): PERMACATH PLACEMENT  Abscess of left buttock: s/p I &amp; D  AV fistula: placement left lower arm  S/P cystoscopy: stent placement &amp; removal for kidney stones, lithiotripsy  s/p Lt Carpal tunnel: 2011      Vital Signs Last 24 Hrs  T(C): 37 (09 Jan 2020 07:00), Max: 37.7 (08 Jan 2020 19:00)  T(F): 98.6 (09 Jan 2020 07:00), Max: 99.9 (08 Jan 2020 19:00)  HR: 71 (09 Jan 2020 10:00) (62 - 98)  BP: 137/76 (09 Jan 2020 10:00) (110/64 - 179/97)  BP(mean): 101 (09 Jan 2020 10:00) (82 - 132)  RR: 14 (09 Jan 2020 10:00) (11 - 30)  SpO2: 100% (09 Jan 2020 10:00) (96% - 100%)    I&O's Summary    08 Jan 2020 07:01  -  09 Jan 2020 07:00  --------------------------------------------------------  IN: 03001 mL / OUT: 4030 mL / NET: 24047 mL    09 Jan 2020 07:01  -  09 Jan 2020 11:00  --------------------------------------------------------  IN: 24 mL / OUT: 3000 mL / NET: -2976 mL                              8.4    31.53 )-----------( 453      ( 09 Jan 2020 03:52 )             24.3     01-09    128<L>  |  88<L>  |  38<H>  ----------------------------<  71  5.8<H>   |  23  |  6.57<H>    Ca    8.6      09 Jan 2020 10:18  Phos  6.7     01-09  Mg     2.6     01-09    TPro  6.1  /  Alb  2.5<L>  /  TBili  0.8  /  DBili  0.4<H>  /  AST  16  /  ALT  <5<L>  /  AlkPhos  183<H>  01-09          Culture - Blood (collected 01-07-20 @ 13:23)  Source: .Blood Blood-Venous  Preliminary Report (01-08-20 @ 14:02):    No growth to date.    Culture - Blood (collected 01-07-20 @ 13:23)  Source: .Blood Blood-Venous  Preliminary Report (01-08-20 @ 14:02):    No growth to date.    Culture - Urine (collected 01-07-20 @ 00:18)  Source: .Urine Catheterized  Final Report (01-07-20 @ 21:22):    <10,000 CFU/mL Normal Urogenital Gillian    Culture - Body Fluid with Gram Stain (collected 01-06-20 @ 21:03)  Source: Abdominal Fl Abdominal Fluid  Gram Stain (01-06-20 @ 23:41):    No polymorphonuclear leukocytes seen    No organisms seen    by cytocentrifuge  Preliminary Report (01-07-20 @ 17:27):    No growth    Culture - Other (collected 01-05-20 @ 13:59)  Source: Skin from incision  Final Report (01-07-20 @ 11:15):    No growth at 48 hours    Culture - Urine (collected 01-05-20 @ 13:59)  Source: .Urine Clean Catch (Midstream)  Final Report (01-07-20 @ 20:09):    >100,000 CFU/ml Enterobacter cloacae  Organism: Enterobacter cloacae (01-07-20 @ 20:09)  Organism: Enterobacter cloacae (01-07-20 @ 20:09)    Culture - Blood (collected 01-04-20 @ 13:07)  Source: .Blood Blood  Preliminary Report (01-05-20 @ 14:01):    No growth to date.    Culture - Blood (collected 01-04-20 @ 13:07)  Source: .Blood Blood  Preliminary Report (01-05-20 @ 14:01):    No growth to date.      EXAM:  DUPLEX EXT VEINS LOWER RT                  PROCEDURE DATE:  01/08/2020    INTERPRETATION:  CLINICAL INFORMATION: Right lower extremity swelling. Patient is status post kidney transplant.    COMPARISON: A prior study, dated 1/4/2020, showed no DVT.    TECHNIQUE: Duplex sonography of the RIGHT LOWER extremity veins with color and spectral Doppler, with and without compression.      FINDINGS: There is an enlarged right inguinal lymph node.    There is acute, echogenic, partially occlusive DVT affecting the right external iliac vein and common femoral vein.    There is occlusive thrombus affecting the right femoral vein in the proximal thigh.    The right popliteal vein is patent and free of thrombus.    The right posterior tibial and peroneal veins are patent in the calf.    There is acute DVT affecting the right soleal vein in the calf.    The contralateral common femoral vein is patent.  IMPRESSION:   There is acute above-the-knee thrombus affecting the right external iliac, common femoral and femoral veins.  There is acute calf vein thrombus affecting the right soleal vein.        XAM:  US KIDNEY TRANSPLANT W DOPP RT             PROCEDURE DATE:  01/07/2020    INTERPRETATION:  CLINICAL INFORMATION: Hematuria with elevated creatinine to 7.4, status post renal transplant on 12/8/2019 complicated by peritransplant hematoma    COMPARISON: Renal ultrasound from 12/31/2019 and CT abdomen and pelvis from 1/4/2020    TECHNIQUE: Grayscale, Color and spectral Doppler evaluation of a RIGHT renal transplant.     FINDINGS:    Renal Transplant: 11.8 cm. Small amount of debris within the collecting system decreased compared to prior exam 12/31/2019 with redemonstration of pelvic fullness, also not significantly changed. Perinephric hematoma measures 9.6 4.0 x 7.0, not significantly changed in size since the prior exam although decreased in echogenicity most likely representing liquefaction.    Urinary bladder: Collapsed around a Kennedy catheter.    Color and spectral Doppler reveals homogeneous flow throughout the transplant.    Peak iliac artery velocity is 179 cm/sec pre-anastomosis, 277 cm/sec at the anastomosis, and 188 cm/sec post anastomosis.    Transplant Renal Artery:   Peak systolic velocity is 281 cm/sec at the anastomosis    At the superior branch: 193 cm/sec proximal, 114 cm/sec mid, 113 cm/sec distal and 32 cm/sec hilum.     At the inferior branch: 248 cm/sec proximal, 79 cm/sec mid, 52 cm/sec distal and 47 cm/sec hilum.     Resistive Indices Range: 0.8 - 0.9    Transplant Renal Vein: Patent..    IMPRESSION:     Perinephric hematoma not significantly changed in size with decreased echogenicity likely related to liquefaction.    Peak systolic velocity at the transplant renal artery anastomosis has decreased from 444 cm to 281 cm/s since 12/31/2019. Continued follow-up is recommended.          Culture - Urine (collected 01-07-20 @ 00:18)  Source: .Urine Catheterized  Final Report (01-07-20 @ 21:22):    <10,000 CFU/mL Normal Urogenital Gillian    Culture - Body Fluid with Gram Stain (collected 01-06-20 @ 21:03)  Source: Abdominal Fl Abdominal Fluid  Gram Stain (01-06-20 @ 23:41):    No polymorphonuclear leukocytes seen    No organisms seen    by cytocentrifuge  Preliminary Report (01-07-20 @ 17:27):    No growth    Culture - Other (collected 01-05-20 @ 13:59)  Source: Skin from incision  Final Report (01-07-20 @ 11:15):    No growth at 48 hours    Culture - Urine (collected 01-05-20 @ 13:59)  Source: .Urine Clean Catch (Midstream)  Final Report (01-07-20 @ 20:09):    >100,000 CFU/ml Enterobacter cloacae  Organism: Enterobacter cloacae (01-07-20 @ 20:09)  Organism: Enterobacter cloacae (01-07-20 @ 20:09)    Culture - Blood (collected 01-04-20 @ 13:07)  Source: .Blood Blood  Preliminary Report (01-05-20 @ 14:01):    No growth to date.    Culture - Blood (collected 01-04-20 @ 13:07)  Source: .Blood Blood  Preliminary Report (01-05-20 @ 14:01):    No growth to date.    Culture - Tissue with Gram Stain (collected 01-01-20 @ 17:12)  Source: .Tissue Other  Gram Stain (01-02-20 @ 00:25):    Rare polymorphonuclear leukocytes seen per low power field    Rare Gram Negative Rods seen per oil power field  Final Report (01-06-20 @ 07:47):    Numerous Enterobacter cloacae complex  Organism: Enterobacter cloacae complex (01-06-20 @ 07:47)  Organism: Enterobacter cloacae complex (01-06-20 @ 07:47)    Culture - Surgical Swab (collected 01-01-20 @ 17:06)  Source: .Surgical Swab collection around right kidney  Final Report (01-06-20 @ 07:45):    Moderate Enterobacter cloacae complex  Organism: Enterobacter cloacae complex (01-06-20 @ 15:11)  Organism: Enterobacter cloacae complex (01-06-20 @ 07:45)    Culture - Body Fluid with Gram Stain (collected 01-01-20 @ 16:59)  Source: .Body Fluid periphrenic collection  Gram Stain (01-02-20 @ 09:22):    Upon re-evaluation of gram stain: Rare Gram Negative Rods seen    No polymorphonuclear cells seen    by cytocentrifuge  Final Report (01-06-20 @ 13:03):    Moderate Escherichia coli    Moderate Enterobacter cloacae (Carbapenem Resistant)  Organism: Escherichia coli  Enterobacter cloacae (Carbapenem Resistant) (01-06-20 @ 08:44)  Organism: Enterobacter cloacae (Carbapenem Resistant) (01-06-20 @ 08:44)  Organism: Escherichia coli (01-06-20 @ 08:44)    Culture - Surgical Swab (collected 01-01-20 @ 16:51)  Source: .Surgical Swab right kidney abscess  Final Report (01-06-20 @ 07:45):    Moderate Enterobacter cloacae complex  Organism: Enterobacter cloacae complex (01-06-20 @ 07:45)  Organism: Enterobacter cloacae complex (01-06-20 @ 07:45)              REVIEW OF SYSTEMS  Gen: No weight changes, fatigue, fevers/chills, weakness  Skin: No rashes  Head/Eyes/Ears/Mouth: No headache; Normal hearing; Normal vision w/o blurriness; No sinus pain/discomfort, sore throat  Respiratory: No dyspnea, cough, wheezing, hemoptysis  CV: No chest pain, PND, orthopnea  GI: Denies abdominal pain at time of exam. No diarrhea, constipation, nausea, vomiting, melena, hematochezia  : No increased frequency, dysuria, hematuria, nocturia.  Reports resolution of bladder spasms  MSK: No joint pain/swelling; no back pain; no edema  Neuro: No dizziness/lightheadedness, weakness, seizures, numbness, tingling  Heme: No easy bruising or bleeding  Endo: No heat/cold intolerance  Psych: No significant nervousness, anxiety, stress, depression      PHYSICAL EXAM:    Constitutional: Well developed / well nourished  	Eyes: Anicteric, PERRLA  	ENMT: nc/at   	Neck: Supple  	Respiratory: CTA     	Cardiovascular: RRR  	Gastrointestinal: Soft abdomen, tender to palpation, ND. incision w/ serosanguinous drainage, BO w/ minimal drainage    Genitourinary:  Kennedy 3way in place (placed by urology) - CBI held    Extremities: SCD's in place and working bilaterally  	Vascular: doppler signal R DP and PT, faintly palpable L DP.  3+ pitting edema RLE, 1+LLE   	Neurological: A&O x3. no confusion noted on exam  	Skin: RLQ dressing intact with SS drainage noted. BO patent with scan output   	Musculoskeletal: Moving all extremities    Psychiatric: Responsive

## 2020-01-09 NOTE — PROGRESS NOTE ADULT - ATTENDING COMMENTS
Will obtain stat sono to r/u RVT in setting of extensive DVT and hematuria  Still with ROYAL  Hyponatremia - may be volume related or pt may be absorbing CBI fluid.  WIll stop cbi and give 2% saline for now.  Dialysis later today.    Immunosuppression minimized due to several infections. Kidney recipient with DGF, on hemodialysis  Recent events reviewed, clinical, lab data and imaging studied reviewed.  Currently has right ext iliac DVT, hematuria on anticoagulation, which has been discontinued  Reviewed immunosuppression, on minimized immunosuppression, on steroids in view of infection  Reviewed antimicrobial regimen  Plan of care reviewed with ICU team, transplant surgery team  Plan:  Minimized immunosuppression  PRBC transfusion  Vascular issue -DVT with bleeding being evaluated by ICU/surgical team  Hemodialysis without heparin, limited fluid removal, being managed by primary nephrologist  Will continue to follow  I was present during and reviewed clinical and lab data as well as assessment and plan as documented by the house staff as noted. Please contact if any additional questions with any change in clinical condition or on availability of any additional information or reports.

## 2020-01-09 NOTE — PROGRESS NOTE ADULT - SUBJECTIVE AND OBJECTIVE BOX
Vascular & Interventional Radiology Post-Procedure Note    Pre-Procedure Diagnosis: Hematuria  Post-Procedure Diagnosis: Same as pre.  Indications for Procedure: Hematuria    Attending: Dr. Valle  Resident: Dr. Bryant    Procedure Details/Findings: Pseudoaneurysm involving the external iliac / transplant renal artery anastomosis s/p successful covered stent placement.   Access (if applicable): Left common femoral artery    Complications: none  Estimated Blood Loss: Minimal  Specimen: none  Contrast: 80cc omni 350  Sedation: yes  Patient Condition/Disposition: Stable, patient to go to the OR for removal of transplant kidney    Plan:  - Dual antiplatelet therapy per primary team.   - groin checks Vascular & Interventional Radiology Post-Procedure Note    Pre-Procedure Diagnosis: Hematuria  Post-Procedure Diagnosis: Same as pre.  Indications for Procedure: Hematuria    Attending: Dr. Valle  Resident: Dr. Bryant    Procedure Details/Findings: Acute large arterial hemorrhage involving the right external iliac / transplant renal artery anastomosis s/p successful covered stent placement.   Access (if applicable): Left common femoral artery    Complications: none  Estimated Blood Loss: Minimal  Specimen: none  Contrast: 80cc omni 350  Sedation: yes  Patient Condition/Disposition: Stable, patient to go to the OR for removal of transplant kidney    Plan:  - Dual antiplatelet therapy per primary team.   - groin checks

## 2020-01-09 NOTE — CHART NOTE - NSCHARTNOTEFT_GEN_A_CORE
Transplant Surgery Event note  --------------------------------------------------------------  Notified by nursing that patient had significant amount of bleeding from his meatus around his martinez catheter with very large clots noted in martinez tubing and in urimeter. Irrigation was attempted, but martinez was clotted. Heparin drip was stopped.  Dr. Poe reviewed case with IR and Vascular surgery and decision was made to proceed with IR placement of IVC filter, renal transplant angiogram with possible embolization.  Also, Dr. Poe discussed possibility of nephrectomy with patient. Consents were obtained.  2Units PRBC transfused.  Hemodynamics were stable at time of exam.  Plan discussed with SICU staff.       ------------------------------  Vital Signs Last 24 Hrs  T(C): 36.3 (09 Jan 2020 19:00), Max: 37 (09 Jan 2020 07:00)  T(F): 97.3 (09 Jan 2020 19:00), Max: 98.6 (09 Jan 2020 07:00)  HR: 69 (09 Jan 2020 19:10) (62 - 101)  BP: 202/82 (09 Jan 2020 19:10) (110/64 - 222/94)  BP(mean): 118 (09 Jan 2020 19:10) (80 - 135)  RR: 20 (09 Jan 2020 19:10) (11 - 30)  SpO2: 100% (09 Jan 2020 19:10) (90% - 100%)    I&O's Summary    08 Jan 2020 07:01  -  09 Jan 2020 07:00  --------------------------------------------------------  IN: 98091 mL / OUT: 4030 mL / NET: 86198 mL    09 Jan 2020 07:01  -  09 Jan 2020 19:47  --------------------------------------------------------  IN: 1620 mL / OUT: 4590 mL / NET: -2970 mL                            8.2    24.88 )-----------( 489      ( 09 Jan 2020 16:46 )             23.5     01-09    129<L>  |  91<L>  |  40<H>  ----------------------------<  75  4.5   |  22  |  6.71<H>    Ca    8.6      09 Jan 2020 14:15  Phos  6.3     01-09  Mg     2.5     01-09    TPro  6.1  /  Alb  2.5<L>  /  TBili  0.8  /  DBili  0.4<H>  /  AST  16  /  ALT  <5<L>  /  AlkPhos  183<H>  01-09

## 2020-01-09 NOTE — PROGRESS NOTE ADULT - PROBLEM SELECTOR PLAN 1
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 tacrolimus) requiring HD, PLEX 12/12 and plasmapheresis 12/14/19. Switched tacrolimus to belatacept.    - Biopsy in OR (1/1/20) revealed ATN, no evidence of rejection.  - Had infected collection washed out, however developed fever and tachyarrhythmia, abdominal pain. Found to have diffusely enlarging perinephric fluid collection on CT imaging. Pt. now s/p CT guided drainage of fluid by IR team on 1/6/20. 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 tacrolimus) requiring HD, PLEX 12/12 and plasmapheresis 12/14/19. Switched tacrolimus to belatacept.    - Biopsy in OR (1/1/20) revealed ATN, no evidence of rejection.  - Had infected collection washed out, however developed fever and tachyarrhythmia, abdominal pain. Found to have diffusely enlarging perinephric fluid collection on CT imaging. Pt. now s/p CT guided drainage of fluid by IR team on 1/6/20.  - Pt. found to have extensive RLE DVT involving right external iliac. Will check renal US with Doppler to assess flow to allograft.

## 2020-01-09 NOTE — PROGRESS NOTE ADULT - ATTENDING COMMENTS
Confused, delirious, non focal, may need CT head if does not improve  Repeat renal US good blood flow  Hypertonic saline for hyponatremia, close monitoring of Na  DC CBI, hematuria resolved  Low HCT stable, continue Heparin drip gtt for extensive DVT  Abx Meropenem and Cipro for Enterobacter UTI, monitor persistent leucocytosis  PT/mobilization  Discussed with Dr Poe

## 2020-01-09 NOTE — PROGRESS NOTE ADULT - ASSESSMENT
ASSESSMENT:  KAYLA PA is a 49y Male with history of ESRD on HD  (via LUE AVF), HTN, Gout, Glaucoma, PNET s/p robotic distal panc/splenectomy (at [place] 2015 by Dr. Young, followed by Dr. Raphael, [facility] Oncologist), RA with hand contractures who underwent DDRT on 12/8/19 (ureteral stent sutured to Kennedy - removed 12/15). Post op course c/b DGF requiring HD, thrombocytopenia, elevated LDH and Haptoglobulin. Schistocytes  on peripheral smear concerning for TMA. Envarsus held. Underwent renal bx on 12/13 c/w profound ATN. 1/1/2020 was taken to OR and found to have a perinephric hematoma. Underwent washout, evacuation of hematoma, and biopsy of transplanted kidney. He was recovering on the floor when overnight he spiked a fever to 38.1 and had 18 beats of wide complex tachycardia. He was also found to have worsening leukocytosis and became acutely hypotensive. A CT scan was obtained deomnstrating a 4.5x3.7 perinephric collection. IR was consulted for drainage and SICU was consulted for hemodynamic monitoring.    PLAN:    NEURO:  - Tylenol 650 mg q6 prn (mild)  - Tramadol 25/50 mg q6 prn (mod/severe)  - Gabapentin 100 mg BID  - Dilaudid 0.25mg for breakthrough pain.    RESPIRATORY:   - No active issues  - OOB as tolerated, PT    CARDIOVASCULAR:  - Monitor hemodynamics  - f/u cards recs for wide complex tachycardia  - Hold home hydralazine, metoprolol    GI/NUTRITION:  - NPO  - Famotidine 20 mg daily  - Miralax daily and senna 2 tabs qhs  - Transplant meds: prednisone 5 mg daily, allopurinol 100 mg daily  - MMF per transplant team    GENITOURINARY/RENAL:  - Monitor electrolytes  - Continue w/ HD for now  - f/u renal biopsy pathology  - CBI per urology, appreciate recs  - Oxybutinin 10 mg q8h    HEMATOLOGIC:  - Therapeutic heparin gtt for RLE DVT  - f/u PTT  - f/u vasc recs  - Trend H/H    INFECTIOUS DISEASE:  - Meropenem 500 mg daily  - Fluconazole 400 mg daily  - PPX: valganciclovir 450 mg three times weekly, bactrim 1 tab daily  - Appreciate ID input  -redose of amikacin     ENDOCRINE:  - Monitor glucose on BMP    OPHTHALMOLOGY:  - Brimonidine/latanoprost drops

## 2020-01-09 NOTE — PROGRESS NOTE ADULT - ASSESSMENT
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 Post op course c/b DGF s/p renal bx on 12/13 c/b perirenal hematoma/abscess s/p washout 1/1. Patient is s/p IR aspiration of persistent collection seen inferior to pelvic kidney, now with new-onset gross hematuria    - Daily CBC  - Monitor urine color off CBI; irrigate prn  - Will keep martinez catheter in place

## 2020-01-09 NOTE — PROGRESS NOTE ADULT - SUBJECTIVE AND OBJECTIVE BOX
Seen in ICU awake, alert    Vital Signs Last 24 Hrs  T(C): 36.9 (01-09-20 @ 15:00), Max: 37.7 (01-08-20 @ 19:00)  T(F): 98.4 (01-09-20 @ 15:00), Max: 99.9 (01-08-20 @ 19:00)  HR: 82 (01-09-20 @ 15:00) (62 - 98)  BP: 167/88 (01-09-20 @ 15:00) (110/64 - 179/97)  BP(mean): 118 (01-09-20 @ 15:00) (82 - 132)  RR: 29 (01-09-20 @ 15:00) (11 - 30)  SpO2: 100% (01-09-20 @ 15:00) (96% - 100%)    Card S1S2  Lungs b/l air entry  Abd soft, ND  Extr + edema R >> L, AVF patent                                                                                  8.6    27.31 )-----------( 475      ( 09 Jan 2020 14:14 )             25.7     09 Jan 2020 14:15    129    |  91     |  40     ----------------------------<  75     4.5     |  22     |  6.71     Ca    8.6        09 Jan 2020 14:15  Phos  6.3       09 Jan 2020 14:15  Mg     2.5       09 Jan 2020 14:15    TPro  6.1    /  Alb  2.5    /  TBili  0.8    /  DBili  0.4    /  AST  16     /  ALT  <5     /  AlkPhos  183    09 Jan 2020 03:52    LIVER FUNCTIONS - ( 09 Jan 2020 03:52 )  Alb: 2.5 g/dL / Pro: 6.1 g/dL / ALK PHOS: 183 U/L / ALT: <5 U/L / AST: 16 U/L / GGT: x           PT/INR - ( 09 Jan 2020 03:52 )   PT: 12.4 sec;   INR: 1.08 ratio      acetaminophen   Tablet .. 650 milliGRAM(s) Oral every 6 hours PRN  allopurinol 100 milliGRAM(s) Oral daily  brimonidine 0.2% Ophthalmic Solution 1 Drop(s) Both EYES three times a day  chlorhexidine 2% Cloths 1 Application(s) Topical <User Schedule>  ciprofloxacin   IVPB 400 milliGRAM(s) IV Intermittent every 24 hours  famotidine    Tablet 20 milliGRAM(s) Oral daily  fluconAZOLE   Tablet 400 milliGRAM(s) Oral daily  heparin  Infusion 1000 Unit(s)/Hr IV Continuous <Continuous>  latanoprost 0.005% Ophthalmic Solution 1 Drop(s) Both EYES daily  lidocaine 2% Jelly 1 milliLiter(s) IntraUrethral two times a day PRN  meropenem  IVPB 500 milliGRAM(s) IV Intermittent every 24 hours  oxybutynin 10 milliGRAM(s) Oral every 8 hours  polyethylene glycol 3350 17 Gram(s) Oral daily  predniSONE   Tablet 5 milliGRAM(s) Oral daily  senna 2 Tablet(s) Oral at bedtime  sodium chloride 2% . 1000 milliLiter(s) IV Continuous <Continuous>  traMADol 50 milliGRAM(s) Oral every 6 hours PRN  traMADol 25 milliGRAM(s) Oral every 6 hours PRN  trimethoprim   80 mG/sulfamethoxazole 400 mG 1 Tablet(s) Oral daily  valGANciclovir 450 milliGRAM(s) Oral <User Schedule>    A/P:    Adm w/Flu A 12/27  Hx ESRD on HD  S/p DDRT 12/8/2019, DGF  S/p transplant kidney bx 12/13: ATN, focal TMA  S/p PLEX 12/13 and 12/15  Carlos-nephric hematoma noted on ultrasound 12/27, increased from prior  S/p OR 1/1 for washout and repeat renal graft biopsy  Repeat renal bx c/w ATN  Infected carlos-nephric hematoma, s/p drainage w/IR  Abx per ID  Hematuria, on CBI,  following  RLE DVT, AC  HD today, TMP 1-2 kg  Avoid hypotension  Avoid nephrotoxins  Next HD pending clinical course  D/w transplant team

## 2020-01-09 NOTE — PROGRESS NOTE ADULT - ASSESSMENT
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 Young, followed by Dr. Raphael, [facility] Oncologist), RA with hand contractures. 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 Post op course c/b DGF requiring HD, thrombocytopenia, elevated LDH and Haptoglobulin. Schistocytes  on peripheral smear concerning for TMA. Envarsus held. Received Belatacept 12/13. Started on PLEX (12/12, 12/15). Underwent renal bx on 12/13 c/w profound ATN. Re-admitted with Influenza,  s/p hematoma evacuation/ abd washout and renal biopsy now with leukocytosis and fevers and acute DVT of R external iliac, common femoral, femoral veins, R soleal vein requiring heparin drip.       DDRT   - DDRT 12/18/19 c/b DGF, TMA s/p PLEX and biopsy X2 proven ATN  - Enterobacter Cloacae UTI and infected peripnephric hematoma s/p washout, and IR drainage. Currently on Meropenem and Fluconazole with WBC 31,000  - New acute DVT of R external iliac, common femoral, femoral veins, R soleal vein requiring heparin drip  - Acute bleeding from urethral meatus on CBI. Stable H/H.   Stop CBI today  - Hold HD pending ultrasound today  - Urgent US to evaluate  renal graft flow  - Hyponatremia: Start 2% NS at 50ml/hour. BMP q4 hours to evaluate serum Na  - Immuno: Continue to Hold Belatacept for now (last dose 12/28).   Pred 5  - MMF on hold 2/2 infection  - PPx: bactrim/valcyte TIW  - Fluconazole 200 mg QD. Nystatin stopped   - Strict I/Os  - Renal diet  - Bowel regimen  - SCDs , IS, encourage ambulation       Enterobacter cloacae UTI/Infected Hematoma  - Continue Meropenem and Fluconazole  - Add Ciprofloxacin as per ID recommendations  - S/P Amikacin x2 (1/7 given pre-HD, dose repeated 1/8)  - Daily CBC, procalcitonin  - Follow cultures    HTN,  BP   - hold  hydralazine    - Cardiology consulted for episode of wide complex tachycardia.  No further episodes.  Recommending Metoprolol 12.5ng BID when appropriate and ECHO      Influenza  - RVP + influenza A  - Completed Tamiflu

## 2020-01-09 NOTE — PROGRESS NOTE ADULT - SUBJECTIVE AND OBJECTIVE BOX
[facility] DIVISION OF KIDNEY DISEASES AND HYPERTENSION -- FOLLOW UP NOTE  --------------------------------------------------------------------------------  HPI: 48 yo M with hx of ESRD on HD, s/p DDRT on 12/8, elevated SCr on HD using AVF, complicated by TMA related to CNI, now on belatacept, admitted with influenza. Pt s/p washout of infected collection and biopsy (1/1/20) which revealed ATN. Last HD treatment completed on 1/4/20. Pt. transferred to SICU for hemodynamic monitoring in the setting of fever, tachyarrhythmia, and CT imaging showing diffusely enlarged perinephric collection. Pt. with UOP in past 24 hours of ~200 cc. Pt. underwent CT guided drainage of perinephric fluid collection with IR team yesterday, with culture showing no growth. Leukocytosis worsened this AM. Pt. also evaluated for hematuria by urology team with Kennedy catheter placed and initiated on CBI. Pt. received blood transfusions and HD treatment on 1/7/20. Pt. started on amikacin on 1/7/20, now with stable WBC. Pt. found to have RLE DVT yesterday.    Pt. seen and examined while sitting up in chair this AM in SICU. Pt. appears mildly confused. States that spasms from Kennedy catheter have subsided. Denies CP, SOB, N/V/F/C.  PAST HISTORY  --------------------------------------------------------------------------------  No significant changes to PMH, PSH, FHx, SHx, unless otherwise noted    ALLERGIES & MEDICATIONS  --------------------------------------------------------------------------------  Allergies    coconut (Anaphylaxis)  morphine (Pruritus; Rash)    Intolerances    Standing Inpatient Medications  allopurinol 100 milliGRAM(s) Oral daily  brimonidine 0.2% Ophthalmic Solution 1 Drop(s) Both EYES three times a day  chlorhexidine 2% Cloths 1 Application(s) Topical <User Schedule>  famotidine    Tablet 20 milliGRAM(s) Oral daily  fluconAZOLE   Tablet 400 milliGRAM(s) Oral daily  gabapentin 100 milliGRAM(s) Oral two times a day  heparin  Infusion 1000 Unit(s)/Hr IV Continuous <Continuous>  latanoprost 0.005% Ophthalmic Solution 1 Drop(s) Both EYES daily  meropenem  IVPB 500 milliGRAM(s) IV Intermittent every 24 hours  oxybutynin 10 milliGRAM(s) Oral every 8 hours  polyethylene glycol 3350 17 Gram(s) Oral daily  predniSONE   Tablet 5 milliGRAM(s) Oral daily  senna 2 Tablet(s) Oral at bedtime  trimethoprim   80 mG/sulfamethoxazole 400 mG 1 Tablet(s) Oral daily  valGANciclovir 450 milliGRAM(s) Oral <User Schedule>    REVIEW OF SYSTEMS  --------------------------------------------------------------------------------  Gen: No fatigue, fevers/chills, weakness  Skin: No rashes  Head/Eyes/Ears/Mouth: No headache; No sore throat  Respiratory: No dyspnea, cough,   CV: No chest pain, PND, orthopnea  GI: No abdominal pain  Transplant: + pain by incision   : + hematuria    All other systems were reviewed and are negative, except as noted.  VITALS/PHYSICAL EXAM  --------------------------------------------------------------------------------  T(C): 36.6 (01-09-20 @ 03:00), Max: 37.7 (01-08-20 @ 19:00)  HR: 69 (01-09-20 @ 06:00) (62 - 98)  BP: 127/75 (01-09-20 @ 06:00) (94/52 - 179/97)  RR: 13 (01-09-20 @ 06:00) (11 - 30)  SpO2: 100% (01-09-20 @ 06:00) (98% - 100%)  Wt(kg): --    01-07-20 @ 07:01  -  01-08-20 @ 07:00  --------------------------------------------------------  IN: 25882 mL / OUT: 1300 mL / NET: 85709 mL    01-08-20 @ 07:01  -  01-09-20 @ 06:51  --------------------------------------------------------  IN: 34491 mL / OUT: 4030 mL / NET: 65739 mL    Physical Exam:  	Gen: NAD  	HEENT: Supple neck  	Pulm: CTA B/L  	CV: RRR, S1S2; no rub  	Abd: +BS, soft, nontender/nondistended                      Transplant: incision c/d/i  	: + Kennedy with blood-tinged urine (improved)  	LE: +1-2 b/l pitting ankle edema, RLE edema  	Neuro: No focal deficits  	Psych: Normal affect and mood  	Skin: Warm, without rashes  LABS/STUDIES  --------------------------------------------------------------------------------              8.4    31.53 >-----------<  453      [01-09-20 @ 03:52]              24.3     126  |  88  |  37  ----------------------------<  85      [01-09-20 @ 03:52]  4.4   |  22  |  6.60        Ca     8.8     [01-09-20 @ 03:52]      Mg     2.6     [01-09-20 @ 03:52]      Phos  5.8     [01-09-20 @ 03:52]    Creatinine Trend:  SCr 6.60 [01-09 @ 03:52]  SCr 5.13 [01-08 @ 00:29]  SCr 7.39 [01-07 @ 00:39]  SCr 7.06 [01-06 @ 20:45]  SCr 6.37 [01-06 @ 04:55]

## 2020-01-09 NOTE — PROGRESS NOTE ADULT - PROBLEM SELECTOR PLAN 3
Hematoma in setting of biopsy and plasma exchange.  s/p PRBC transfusion last on 1/7/20 Hematoma in setting of biopsy and plasma exchange. s/p PRBC transfusion last on 1/7/20

## 2020-01-09 NOTE — PROGRESS NOTE ADULT - SUBJECTIVE AND OBJECTIVE BOX
[facility]    Melissa Kelsey, Enrique, Maximino, Marin, Hamzah, Ravi      [phone]    CHIEF COMPLAINT: Patient is a 49y old  Male who presents with a chief complaint of Fever (09 Jan 2020 10:03)      Follow Up: atn, infected collection, wct, dvt    Interim history: The patient reports no new symptoms.  Denies chest discomfort and shortness of breath.  No abdominal pain.  No new neurologic symptoms. Rle remains painful and edematous      MEDICATIONS  (STANDING):  allopurinol 100 milliGRAM(s) Oral daily  brimonidine 0.2% Ophthalmic Solution 1 Drop(s) Both EYES three times a day  chlorhexidine 2% Cloths 1 Application(s) Topical <User Schedule>  ciprofloxacin   IVPB 400 milliGRAM(s) IV Intermittent every 24 hours  famotidine    Tablet 20 milliGRAM(s) Oral daily  fluconAZOLE   Tablet 400 milliGRAM(s) Oral daily  heparin  Infusion 1000 Unit(s)/Hr (12 mL/Hr) IV Continuous <Continuous>  latanoprost 0.005% Ophthalmic Solution 1 Drop(s) Both EYES daily  meropenem  IVPB 500 milliGRAM(s) IV Intermittent every 24 hours  oxybutynin 10 milliGRAM(s) Oral every 8 hours  polyethylene glycol 3350 17 Gram(s) Oral daily  predniSONE   Tablet 5 milliGRAM(s) Oral daily  senna 2 Tablet(s) Oral at bedtime  sodium chloride 2% . 1000 milliLiter(s) (50 mL/Hr) IV Continuous <Continuous>  trimethoprim   80 mG/sulfamethoxazole 400 mG 1 Tablet(s) Oral daily  valGANciclovir 450 milliGRAM(s) Oral <User Schedule>    MEDICATIONS  (PRN):  acetaminophen   Tablet .. 650 milliGRAM(s) Oral every 6 hours PRN Mild Pain (1 - 3)  lidocaine 2% Jelly 1 milliLiter(s) IntraUrethral two times a day PRN irritation  traMADol 50 milliGRAM(s) Oral every 6 hours PRN Severe Pain (7 - 10)  traMADol 25 milliGRAM(s) Oral every 6 hours PRN Moderate Pain (4 - 6)      REVIEW OF SYSTEMS:  eye, ent, GI, , allergic, dermatologic, musculoskeletal and neurologic are negative except as described above    Vital Signs Last 24 Hrs  T(C): 37 (09 Jan 2020 07:00), Max: 37.7 (08 Jan 2020 19:00)  T(F): 98.6 (09 Jan 2020 07:00), Max: 99.9 (08 Jan 2020 19:00)  HR: 71 (09 Jan 2020 10:00) (62 - 98)  BP: 137/76 (09 Jan 2020 10:00) (104/61 - 179/97)  BP(mean): 101 (09 Jan 2020 10:00) (76 - 132)  RR: 14 (09 Jan 2020 10:00) (11 - 30)  SpO2: 100% (09 Jan 2020 10:00) (96% - 100%)    I&O's Summary    08 Jan 2020 07:01  -  09 Jan 2020 07:00  --------------------------------------------------------  IN: 53213 mL / OUT: 4030 mL / NET: 35021 mL    09 Jan 2020 07:01  -  09 Jan 2020 10:50  --------------------------------------------------------  IN: 24 mL / OUT: 3000 mL / NET: -2976 mL        Telemetry past 24h: sr    PHYSICAL EXAM:    Constitutional: well-nourished, well-developed, NAD   HEENT:  MMM, sclerae anicteric, conjunctivae clear, no oral cyanosis.  Pulmonary: Non-labored, breath sounds are clear bilaterally, No wheezing, rales or rhonchi  Cardiovascular: Regular, S1 and S2.  No murmur.  No rubs, gallops or clicks  Gastrointestinal: Bowel Sounds present, soft, nontender.   Lymph: marked r>l peripheral edema.   Neurological: Alert, no focal deficits  Skin: No rashes.  Psych:  Mood & affect appropriate    LABS: All Labs Reviewed:                        8.4    31.53 )-----------( 453      ( 09 Jan 2020 03:52 )             24.3                         9.3    32.69 )-----------( 504      ( 08 Jan 2020 21:35 )             25.9                         8.5    29.86 )-----------( 409      ( 08 Jan 2020 09:04 )             25.3     09 Jan 2020 03:52    126    |  88     |  37     ----------------------------<  85     4.4     |  22     |  6.60   08 Jan 2020 00:29    135    |  94     |  26     ----------------------------<  95     4.7     |  24     |  5.13   07 Jan 2020 00:39    132    |  93     |  43     ----------------------------<  107    4.3     |  20     |  7.39     Ca    8.8        09 Jan 2020 03:52  Ca    8.4        08 Jan 2020 00:29  Ca    8.3        07 Jan 2020 00:39  Phos  5.8       09 Jan 2020 03:52  Phos  4.5       08 Jan 2020 00:29  Phos  6.6       07 Jan 2020 00:39  Mg     2.6       09 Jan 2020 03:52  Mg     1.9       08 Jan 2020 00:29  Mg     2.0       07 Jan 2020 00:39    TPro  6.1    /  Alb  2.5    /  TBili  0.8    /  DBili  0.4    /  AST  16     /  ALT  <5     /  AlkPhos  183    09 Jan 2020 03:52  TPro  5.9    /  Alb  2.4    /  TBili  1.0    /  DBili  0.4    /  AST  21     /  ALT  6      /  AlkPhos  184    08 Jan 2020 00:29  TPro  6.2    /  Alb  2.7    /  TBili  0.6    /  DBili  0.3    /  AST  8      /  ALT  5      /  AlkPhos  194    07 Jan 2020 00:39    PT/INR - ( 09 Jan 2020 03:52 )   PT: 12.4 sec;   INR: 1.08 ratio         PTT - ( 09 Jan 2020 10:18 )  PTT:55.3 sec      Blood Culture: Organism --  Gram Stain Blood -- Gram Stain --  Specimen Source .Blood Blood-Venous  Culture-Blood --    Organism --  Gram Stain Blood -- Gram Stain --  Specimen Source .Urine Catheterized  Culture-Blood --    Organism --  Gram Stain Blood -- Gram Stain   No polymorphonuclear leukocytes seen  No organisms seen  by cytocentrifuge  Specimen Source Abdominal Fl Abdominal Fluid  Culture-Blood --    Organism Enterobacter cloacae  Gram Stain Blood -- Gram Stain --  Specimen Source .Urine Clean Catch (Midstream)  Culture-Blood --    Organism --  Gram Stain Blood -- Gram Stain --  Specimen Source .Blood Blood  Culture-Blood --            RADIOLOGY:    EKG:    Echo:

## 2020-01-09 NOTE — PROGRESS NOTE ADULT - SUBJECTIVE AND OBJECTIVE BOX
Patient  has hematuria  but not  worse  Has  DVT femoral and  external iliac vein  Has  infrapopliteal calf  vein  thrombus  US to evaluate  Kidney transplant  and  the venous  and  arterial  flow  is  pending   I suggest  continue  heparin  therapeutic  levels

## 2020-01-09 NOTE — PROGRESS NOTE ADULT - SUBJECTIVE AND OBJECTIVE BOX
Subjective:  Patient at baseline this AM.   Kennedy draining clear pink urine off CBI. No clots.    Objectives:  T(C): 37 (01-09-20 @ 07:00), Max: 37 (01-09-20 @ 07:00)  HR: 69 (01-09-20 @ 09:00) (62 - 79)  BP: 135/79 (01-09-20 @ 09:00) (110/64 - 136/80)  RR: 22 (01-09-20 @ 09:00) (11 - 22)  SpO2: 96% (01-09-20 @ 09:00) (96% - 100%)  Wt(kg): --    01-08 @ 07:01  -  01-09 @ 07:00  --------------------------------------------------------  IN:    Continuous Bladder Irrigation: 06441 mL    heparin Infusion: 178 mL    Oral Fluid: 500 mL    Solution: 150 mL  Total IN: 18729 mL    OUT:    Continuous Bladder Irrigation: 4000 mL    Indwelling Catheter - Urethral: 30 mL  Total OUT: 4030 mL    Total NET: 95701 mL      01-09 @ 07:01  -  01-09 @ 10:03  --------------------------------------------------------  IN:    heparin Infusion: 24 mL  Total IN: 24 mL    OUT:    Continuous Bladder Irrigation: 3000 mL  Total OUT: 3000 mL    Total NET: -2976 mL          Physcial Exam  GENERAL: NAD, well-developed  GENITOURINARY: Kennedy draining clear pink urine.   PSYCH: AAOx3    LABS:                        8.4    31.53 )-----------( 453      ( 09 Jan 2020 03:52 )             24.3     01-09    126<L>  |  88<L>  |  37<H>  ----------------------------<  85  4.4   |  22  |  6.60<H>    Ca    8.8      09 Jan 2020 03:52  Phos  5.8     01-09  Mg     2.6     01-09    TPro  6.1  /  Alb  2.5<L>  /  TBili  0.8  /  DBili  0.4<H>  /  AST  16  /  ALT  <5<L>  /  AlkPhos  183<H>  01-09    CAPILLARY BLOOD GLUCOSE        PT/INR - ( 09 Jan 2020 03:52 )   PT: 12.4 sec;   INR: 1.08 ratio         PTT - ( 09 Jan 2020 03:52 )  PTT:61.8 sec  CAPILLARY BLOOD GLUCOSE

## 2020-01-09 NOTE — PROGRESS NOTE ADULT - SUBJECTIVE AND OBJECTIVE BOX
Follow Up:  Infected Hematoma, Fever,    Interval History: afebrile overnight. minimal bladder spasms today. no diarrhea.     REVIEW OF SYSTEMS  [  ] ROS unobtainable because:    [ x ] All other systems negative except as noted below    Constitutional:  [ ] fever [ ] chills  [ ] weight loss  [ ] weakness  Skin:  [ ] rash [ ] phlebitis	  Eyes: [ ] icterus [ ] pain  [ ] discharge	  ENMT: [ ] sore throat  [ ] thrush [ ] ulcers [ ] exudates  Respiratory: [ ] dyspnea [ ] hemoptysis [ ] cough [ ] sputum	  Cardiovascular:  [ ] chest pain [ ] palpitations [ ] edema	  Gastrointestinal:  [ ] nausea [ ] vomiting [ ] diarrhea [ ] constipation [ ] pain	  Genitourinary:  [ ] dysuria [ ] frequency [ ] hematuria [ ] discharge [ ] flank pain  [ ] incontinence  Musculoskeletal:  [ ] myalgias [ ] arthralgias [ ] arthritis  [ ] back pain  Neurological:  [ ] headache [ ] seizures  [ ] confusion/altered mental status    Allergies  coconut (Anaphylaxis)  morphine (Pruritus; Rash)        ANTIMICROBIALS:  ciprofloxacin   IVPB 400 every 24 hours  fluconAZOLE   Tablet 400 daily  meropenem  IVPB 500 every 24 hours  trimethoprim   80 mG/sulfamethoxazole 400 mG 1 daily  valGANciclovir 450 <User Schedule>      OTHER MEDS:  MEDICATIONS  (STANDING):  acetaminophen   Tablet .. 650 every 6 hours PRN  allopurinol 100 daily  famotidine    Tablet 20 daily  oxybutynin 10 every 8 hours  polyethylene glycol 3350 17 daily  predniSONE   Tablet 5 daily  senna 2 at bedtime  traMADol 50 every 6 hours PRN  traMADol 25 every 6 hours PRN      Vital Signs Last 24 Hrs  T(C): 36.9 (09 Jan 2020 15:00), Max: 37.7 (08 Jan 2020 19:00)  T(F): 98.4 (09 Jan 2020 15:00), Max: 99.9 (08 Jan 2020 19:00)  HR: 101 (09 Jan 2020 17:00) (62 - 101)  BP: 115/57 (09 Jan 2020 17:00) (110/64 - 179/97)  BP(mean): 80 (09 Jan 2020 17:00) (80 - 132)  RR: 29 (09 Jan 2020 17:00) (11 - 30)  SpO2: 90% (09 Jan 2020 16:00) (90% - 100%)    PHYSICAL EXAMINATION:  General: Alert and Awake, NAD  HEENT: PERRL, EOMI  Neck: Supple  Cardiac: RRR, No M/R/G  Resp: CTAB, No Wh/Rh/Ra  Abdomen: Surgical site in RLQ mucoid drainage. RLQ drain with mixed bloody/purulent drainage. mild-mod tenderness in RLQ. No rigidity.   MSK: No LE edema. No Calf tenderness  : No martinez  Skin: No rashes or lesions. Skin is warm and dry to the touch.   Neuro: Alert and Awake. CN 2-12 Grossly intact. Moves all four extremities spontaneously.  Psych: Calm, Pleasant, Cooperative                          8.2    x     )-----------( 489      ( 09 Jan 2020 16:46 )             23.5       01-09    129<L>  |  91<L>  |  40<H>  ----------------------------<  75  4.5   |  22  |  6.71<H>    Ca    8.6      09 Jan 2020 14:15  Phos  6.3     01-09  Mg     2.5     01-09    TPro  6.1  /  Alb  2.5<L>  /  TBili  0.8  /  DBili  0.4<H>  /  AST  16  /  ALT  <5<L>  /  AlkPhos  183<H>  01-09          MICROBIOLOGY:  Vancomycin Level, Random: 19.2 ug/mL (01-09-20 @ 03:52)  v  .Blood Blood-Venous  01-07-20   No growth to date.  --  --      .Urine Catheterized  01-07-20   <10,000 CFU/mL Normal Urogenital Gillian  --  --      Abdominal Fl Abdominal Fluid  01-06-20   No growth  --    No polymorphonuclear leukocytes seen  No organisms seen  by cytocentrifuge      .Urine Clean Catch (Midstream)  01-05-20   >100,000 CFU/ml Enterobacter cloacae  --  Enterobacter cloacae      .Blood Blood  01-04-20   No growth at 5 days.  --  --      .Tissue Other  01-01-20   Numerous Enterobacter cloacae complex  --  Enterobacter cloacae complex      .Surgical Swab collection around right kidney  01-01-20   Moderate Enterobacter cloacae complex  --  Enterobacter cloacae complex      .Body Fluid periphrenic collection  01-01-20   Moderate Escherichia coli  Moderate Enterobacter cloacae (Carbapenem Resistant)  --  Escherichia coli  Enterobacter cloacae (Carbapenem Resistant)      .Surgical Swab right kidney abscess  01-01-20   Moderate Enterobacter cloacae complex  --  Enterobacter cloacae complex      .Urine Clean Catch (Midstream)  12-29-19   >100,000 CFU/ml Enterobacter cloacae  --  Enterobacter cloacae      .Blood Blood-Peripheral  12-27-19   No growth at 5 days.  --  --      .Stool Feces  12-14-19   No enteric pathogens isolated.  (Stool culture examined for Salmonella,  Shigella, Campylobacter, Aeromonas, Plesiomonas,  Vibrio, E.coli O157 and Yersinia)  --  --      .Stool moriah johnie  12-14-19   GI PCR Results: NOT detected  *******Please Note:*******  GI panel PCR evaluates for:  Campylobacter, Plesiomonas shigelloides, Salmonella,  Vibrio, Yersinia enterocolitica, Enteroaggregative  Escherichia coli (EAEC), Enteropathogenic E.coli (EPEC),  Enterotoxigenic E. coli (ETEC) lt/st, Shiga-like  toxin-producing E. coli (STEC) stx1/stx2,  Shigella/ Enteroinvasive E. coli (EIEC), Cryptosporidium,  Cyclospora cayetanensis, Entamoeba histolytica,  Giardia lamblia, Adenovirus F 40/41, Astrovirus,  Norovirus GI/GII, Rotavirus A, Sapovirus  --  --      RADIOLOGY:    EXAM:  CT BRAIN                        PROCEDURE DATE:  01/09/2020    No acute intracranial hemorrhage, mass effect, or other acute intracranial pathology.

## 2020-01-09 NOTE — PROGRESS NOTE ADULT - PROBLEM SELECTOR PLAN 5
BP noted to be acceptable Pt. with hyponatremia noticed on labs this AM. Serum sodium low at 126. Recommend checking serum osm. Unable to check urine studies 2/2 CBI. Hyponatremia in the setting of ? mild volume overload. Monitor serum sodium Pt. with hyponatremia noticed on labs this AM. Serum sodium low at 126. Recommend checking serum osm. Unable to check urine studies 2/2 CBI. Hyponatremia in the setting of ? mild volume overload vs. CBI. Discontinue CBI. Monitor serum sodium

## 2020-01-09 NOTE — PROGRESS NOTE ADULT - PROBLEM SELECTOR PLAN 2
s/p belatacept 12/28/19  - Persistent but stable leukocytosis today. Plan for belatacept infusion today.  - discontinued MMF for concern of infected perinephric collection.  - continue prednisone to 5 mg daily   - Continue Amikacin  - continue ppx: Nystatin, bactrim, Valcyte renally dosed TIW, Pepcid s/p belatacept 12/28/19  - Persistent but stable leukocytosis today. Plan for belatacept infusion today.  - discontinued MMF for concern of infected perinephric collection.  - continue prednisone to 5 mg daily   - Continue Amikacin after HD treatment today  - continue ppx: Nystatin, bactrim, Valcyte renally dosed TIW, Pepcid s/p belatacept 12/28/19  - Persistent but stable leukocytosis today. Was initially planned for belatacept infusion today, however is pending renal US with Doppler to assess flow to allograft.  - discontinued MMF for concern of infected perinephric collection.  - continue prednisone to 5 mg daily   - Continue Amikacin  - continue ppx: Nystatin, bactrim, Valcyte renally dosed TIW, Pepcid

## 2020-01-09 NOTE — PRE-ANESTHESIA EVALUATION ADULT - NSANTHPMHFT_GEN_ALL_CORE
- per note - 49M PMHx of ESRD on HD  ( via LUE AVF), HTN, Gout, Glaucoma, NET of pancreas (s/p robotic distal panc/splenectomy at [place] 2015 by Dr. Young, followed by Dr. Raphael, [facility] Oncologist), RA with hand contractures. 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 Post op course c/b DGF requiring HD, thrombocytopenia, elevated LDH and Haptoglobulin. Schistocytes  on peripheral smear concerning for TMA. Envarsus held. Received Belatacept 12/13. Started on PLEX (12/12, 12/15). Underwent renal bx on 12/13 c/w profound ATN.    1/1/2020 was taken to OR and found to have a perinephric hematoma. Underwent washout, evacuation of hematoma, and biopsy of transplanted kidney. He was recovering on the floor when overnight he spiked a fever to 38.1 and had 18 beats of wide complex tachycardia. He was also found to have worsening leukocytosis and became acutely hypotensive. A CT scan was obtained demonstrating a 4.5x3.7 perinephric collection. IR was consulted for drainage and SICU was consulted for hemodynamic monitoring

## 2020-01-09 NOTE — CHART NOTE - NSCHARTNOTEFT_GEN_A_CORE
I personally saw and examined patient on several occasions.  Prominent hematuria.  Heparin discontinued.  Discussed case with Vascular (Dr. Carson) and Interventional Radiology.  Plan to proceed with IVC filter and simultaneous angiogram at the time of IVC filter placement.  Dr. Mcadams discussed interventional radiology case with patient.  I personally discussed case with patient.  Plan to proceed with nephrectomy if hemorrhage cannot be controlled.  I attempted to contact via my cell phone all emergency numbers ([phone], [phone], [phone], [phone]) but could not obtain an answer in any of them despite several attempts around 18:50 PM.

## 2020-01-10 LAB
-  ERTAPENEM: SIGNIFICANT CHANGE UP
ALBUMIN SERPL ELPH-MCNC: 1.4 G/DL — LOW (ref 3.3–5)
ALP SERPL-CCNC: 88 U/L — SIGNIFICANT CHANGE UP (ref 40–120)
ALT FLD-CCNC: 5 U/L — LOW (ref 10–45)
ANION GAP SERPL CALC-SCNC: 10 MMOL/L — SIGNIFICANT CHANGE UP (ref 5–17)
ANION GAP SERPL CALC-SCNC: 11 MMOL/L — SIGNIFICANT CHANGE UP (ref 5–17)
ANION GAP SERPL CALC-SCNC: 12 MMOL/L — SIGNIFICANT CHANGE UP (ref 5–17)
ANION GAP SERPL CALC-SCNC: 13 MMOL/L — SIGNIFICANT CHANGE UP (ref 5–17)
ANION GAP SERPL CALC-SCNC: 14 MMOL/L — SIGNIFICANT CHANGE UP (ref 5–17)
APTT BLD: 29.1 SEC — SIGNIFICANT CHANGE UP (ref 27.5–36.3)
APTT BLD: 30.2 SEC — SIGNIFICANT CHANGE UP (ref 27.5–36.3)
APTT BLD: 30.3 SEC — SIGNIFICANT CHANGE UP (ref 27.5–36.3)
AST SERPL-CCNC: 7 U/L — LOW (ref 10–40)
BILIRUB DIRECT SERPL-MCNC: 1.4 MG/DL — HIGH (ref 0–0.2)
BILIRUB INDIRECT FLD-MCNC: 0.6 MG/DL — SIGNIFICANT CHANGE UP (ref 0.2–1)
BILIRUB SERPL-MCNC: 2 MG/DL — HIGH (ref 0.2–1.2)
BUN SERPL-MCNC: 22 MG/DL — SIGNIFICANT CHANGE UP (ref 7–23)
BUN SERPL-MCNC: 24 MG/DL — HIGH (ref 7–23)
BUN SERPL-MCNC: 25 MG/DL — HIGH (ref 7–23)
CALCIUM SERPL-MCNC: 7.2 MG/DL — LOW (ref 8.4–10.5)
CALCIUM SERPL-MCNC: 7.2 MG/DL — LOW (ref 8.4–10.5)
CALCIUM SERPL-MCNC: 7.4 MG/DL — LOW (ref 8.4–10.5)
CALCIUM SERPL-MCNC: 7.4 MG/DL — LOW (ref 8.4–10.5)
CALCIUM SERPL-MCNC: 7.6 MG/DL — LOW (ref 8.4–10.5)
CHLORIDE SERPL-SCNC: 102 MMOL/L — SIGNIFICANT CHANGE UP (ref 96–108)
CHLORIDE SERPL-SCNC: 102 MMOL/L — SIGNIFICANT CHANGE UP (ref 96–108)
CHLORIDE SERPL-SCNC: 103 MMOL/L — SIGNIFICANT CHANGE UP (ref 96–108)
CHLORIDE SERPL-SCNC: 105 MMOL/L — SIGNIFICANT CHANGE UP (ref 96–108)
CHLORIDE SERPL-SCNC: 106 MMOL/L — SIGNIFICANT CHANGE UP (ref 96–108)
CK SERPL-CCNC: 58 U/L — SIGNIFICANT CHANGE UP (ref 30–200)
CO2 SERPL-SCNC: 18 MMOL/L — LOW (ref 22–31)
CO2 SERPL-SCNC: 19 MMOL/L — LOW (ref 22–31)
CO2 SERPL-SCNC: 19 MMOL/L — LOW (ref 22–31)
CO2 SERPL-SCNC: 21 MMOL/L — LOW (ref 22–31)
CO2 SERPL-SCNC: 21 MMOL/L — LOW (ref 22–31)
CREAT SERPL-MCNC: 4.21 MG/DL — HIGH (ref 0.5–1.3)
CREAT SERPL-MCNC: 4.52 MG/DL — HIGH (ref 0.5–1.3)
CREAT SERPL-MCNC: 4.73 MG/DL — HIGH (ref 0.5–1.3)
CREAT SERPL-MCNC: 4.79 MG/DL — HIGH (ref 0.5–1.3)
CREAT SERPL-MCNC: 5.05 MG/DL — HIGH (ref 0.5–1.3)
CULTURE RESULTS: SIGNIFICANT CHANGE UP
GAS PNL BLDA: SIGNIFICANT CHANGE UP
GLUCOSE SERPL-MCNC: 110 MG/DL — HIGH (ref 70–99)
GLUCOSE SERPL-MCNC: 144 MG/DL — HIGH (ref 70–99)
GLUCOSE SERPL-MCNC: 87 MG/DL — SIGNIFICANT CHANGE UP (ref 70–99)
GLUCOSE SERPL-MCNC: 93 MG/DL — SIGNIFICANT CHANGE UP (ref 70–99)
GLUCOSE SERPL-MCNC: 94 MG/DL — SIGNIFICANT CHANGE UP (ref 70–99)
GRAM STN FLD: SIGNIFICANT CHANGE UP
HCT VFR BLD CALC: 21.9 % — LOW (ref 39–50)
HCT VFR BLD CALC: 23 % — LOW (ref 39–50)
HCT VFR BLD CALC: 25.2 % — LOW (ref 39–50)
HCT VFR BLD CALC: 25.9 % — LOW (ref 39–50)
HCT VFR BLD CALC: 28.9 % — LOW (ref 39–50)
HGB BLD-MCNC: 10.1 G/DL — LOW (ref 13–17)
HGB BLD-MCNC: 7.7 G/DL — LOW (ref 13–17)
HGB BLD-MCNC: 8.2 G/DL — LOW (ref 13–17)
HGB BLD-MCNC: 8.8 G/DL — LOW (ref 13–17)
HGB BLD-MCNC: 8.9 G/DL — LOW (ref 13–17)
INR BLD: 0.98 RATIO — SIGNIFICANT CHANGE UP (ref 0.88–1.16)
INR BLD: 1.18 RATIO — HIGH (ref 0.88–1.16)
INR BLD: 1.22 RATIO — HIGH (ref 0.88–1.16)
MAGNESIUM SERPL-MCNC: 1.9 MG/DL — SIGNIFICANT CHANGE UP (ref 1.6–2.6)
MAGNESIUM SERPL-MCNC: 2.4 MG/DL — SIGNIFICANT CHANGE UP (ref 1.6–2.6)
MAGNESIUM SERPL-MCNC: 2.7 MG/DL — HIGH (ref 1.6–2.6)
MCHC RBC-ENTMCNC: 29.7 PG — SIGNIFICANT CHANGE UP (ref 27–34)
MCHC RBC-ENTMCNC: 30.1 PG — SIGNIFICANT CHANGE UP (ref 27–34)
MCHC RBC-ENTMCNC: 30.8 PG — SIGNIFICANT CHANGE UP (ref 27–34)
MCHC RBC-ENTMCNC: 34.4 GM/DL — SIGNIFICANT CHANGE UP (ref 32–36)
MCHC RBC-ENTMCNC: 34.9 GM/DL — SIGNIFICANT CHANGE UP (ref 32–36)
MCHC RBC-ENTMCNC: 34.9 GM/DL — SIGNIFICANT CHANGE UP (ref 32–36)
MCHC RBC-ENTMCNC: 35.2 GM/DL — SIGNIFICANT CHANGE UP (ref 32–36)
MCHC RBC-ENTMCNC: 35.7 GM/DL — SIGNIFICANT CHANGE UP (ref 32–36)
MCV RBC AUTO: 86.3 FL — SIGNIFICANT CHANGE UP (ref 80–100)
MCV RBC AUTO: 86.3 FL — SIGNIFICANT CHANGE UP (ref 80–100)
MCV RBC AUTO: 86.5 FL — SIGNIFICANT CHANGE UP (ref 80–100)
MCV RBC AUTO: 87.6 FL — SIGNIFICANT CHANGE UP (ref 80–100)
MCV RBC AUTO: 88.1 FL — SIGNIFICANT CHANGE UP (ref 80–100)
METHOD TYPE: SIGNIFICANT CHANGE UP
MISCELLANEOUS TEST NAME: SIGNIFICANT CHANGE UP
MISCELLANEOUS, NORMALS: SIGNIFICANT CHANGE UP
MISCELLANEOUS, RESULT: SIGNIFICANT CHANGE UP
MISCELLANEOUS, RESULT: SIGNIFICANT CHANGE UP
NRBC # BLD: 0 /100 WBCS — SIGNIFICANT CHANGE UP (ref 0–0)
ORGANISM # SPEC MICROSCOPIC CNT: SIGNIFICANT CHANGE UP
PHOSPHATE SERPL-MCNC: 5.1 MG/DL — HIGH (ref 2.5–4.5)
PHOSPHATE SERPL-MCNC: 5.2 MG/DL — HIGH (ref 2.5–4.5)
PHOSPHATE SERPL-MCNC: 5.3 MG/DL — HIGH (ref 2.5–4.5)
PHOSPHATE SERPL-MCNC: 5.3 MG/DL — HIGH (ref 2.5–4.5)
PHOSPHATE SERPL-MCNC: 5.8 MG/DL — HIGH (ref 2.5–4.5)
PLATELET # BLD AUTO: 178 K/UL — SIGNIFICANT CHANGE UP (ref 150–400)
PLATELET # BLD AUTO: 246 K/UL — SIGNIFICANT CHANGE UP (ref 150–400)
PLATELET # BLD AUTO: 261 K/UL — SIGNIFICANT CHANGE UP (ref 150–400)
PLATELET # BLD AUTO: 269 K/UL — SIGNIFICANT CHANGE UP (ref 150–400)
PLATELET # BLD AUTO: 279 K/UL — SIGNIFICANT CHANGE UP (ref 150–400)
POTASSIUM SERPL-MCNC: 4.2 MMOL/L — SIGNIFICANT CHANGE UP (ref 3.5–5.3)
POTASSIUM SERPL-MCNC: 4.2 MMOL/L — SIGNIFICANT CHANGE UP (ref 3.5–5.3)
POTASSIUM SERPL-MCNC: 4.3 MMOL/L — SIGNIFICANT CHANGE UP (ref 3.5–5.3)
POTASSIUM SERPL-MCNC: 4.6 MMOL/L — SIGNIFICANT CHANGE UP (ref 3.5–5.3)
POTASSIUM SERPL-MCNC: 4.8 MMOL/L — SIGNIFICANT CHANGE UP (ref 3.5–5.3)
POTASSIUM SERPL-SCNC: 4.2 MMOL/L — SIGNIFICANT CHANGE UP (ref 3.5–5.3)
POTASSIUM SERPL-SCNC: 4.2 MMOL/L — SIGNIFICANT CHANGE UP (ref 3.5–5.3)
POTASSIUM SERPL-SCNC: 4.3 MMOL/L — SIGNIFICANT CHANGE UP (ref 3.5–5.3)
POTASSIUM SERPL-SCNC: 4.6 MMOL/L — SIGNIFICANT CHANGE UP (ref 3.5–5.3)
POTASSIUM SERPL-SCNC: 4.8 MMOL/L — SIGNIFICANT CHANGE UP (ref 3.5–5.3)
PROT SERPL-MCNC: 3.5 G/DL — LOW (ref 6–8.3)
PROTHROM AB SERPL-ACNC: 11.3 SEC — SIGNIFICANT CHANGE UP (ref 10–12.9)
PROTHROM AB SERPL-ACNC: 13.6 SEC — HIGH (ref 10–12.9)
PROTHROM AB SERPL-ACNC: 14 SEC — HIGH (ref 10–12.9)
RBC # BLD: 2.5 M/UL — LOW (ref 4.2–5.8)
RBC # BLD: 2.66 M/UL — LOW (ref 4.2–5.8)
RBC # BLD: 2.92 M/UL — LOW (ref 4.2–5.8)
RBC # BLD: 3 M/UL — LOW (ref 4.2–5.8)
RBC # BLD: 3.28 M/UL — LOW (ref 4.2–5.8)
RBC # FLD: 14.1 % — SIGNIFICANT CHANGE UP (ref 10.3–14.5)
RBC # FLD: 14.6 % — HIGH (ref 10.3–14.5)
RBC # FLD: 14.9 % — HIGH (ref 10.3–14.5)
RBC # FLD: 15.4 % — HIGH (ref 10.3–14.5)
RBC # FLD: 15.9 % — HIGH (ref 10.3–14.5)
SODIUM SERPL-SCNC: 134 MMOL/L — LOW (ref 135–145)
SODIUM SERPL-SCNC: 134 MMOL/L — LOW (ref 135–145)
SODIUM SERPL-SCNC: 135 MMOL/L — SIGNIFICANT CHANGE UP (ref 135–145)
SODIUM SERPL-SCNC: 135 MMOL/L — SIGNIFICANT CHANGE UP (ref 135–145)
SODIUM SERPL-SCNC: 138 MMOL/L — SIGNIFICANT CHANGE UP (ref 135–145)
SPECIMEN SOURCE: SIGNIFICANT CHANGE UP
VANCOMYCIN FLD-MCNC: 10.3 UG/ML — SIGNIFICANT CHANGE UP
WBC # BLD: 16.8 K/UL — HIGH (ref 3.8–10.5)
WBC # BLD: 16.92 K/UL — HIGH (ref 3.8–10.5)
WBC # BLD: 17.6 K/UL — HIGH (ref 3.8–10.5)
WBC # BLD: 19.23 K/UL — HIGH (ref 3.8–10.5)
WBC # BLD: 22.1 K/UL — HIGH (ref 3.8–10.5)
WBC # FLD AUTO: 16.8 K/UL — HIGH (ref 3.8–10.5)
WBC # FLD AUTO: 16.92 K/UL — HIGH (ref 3.8–10.5)
WBC # FLD AUTO: 17.6 K/UL — HIGH (ref 3.8–10.5)
WBC # FLD AUTO: 19.23 K/UL — HIGH (ref 3.8–10.5)
WBC # FLD AUTO: 22.1 K/UL — HIGH (ref 3.8–10.5)

## 2020-01-10 PROCEDURE — 99232 SBSQ HOSP IP/OBS MODERATE 35: CPT | Mod: GC

## 2020-01-10 PROCEDURE — 50370 RMVL TRANSPLANTED RNL ALGRFT: CPT | Mod: 78

## 2020-01-10 PROCEDURE — 99292 CRITICAL CARE ADDL 30 MIN: CPT

## 2020-01-10 PROCEDURE — 99291 CRITICAL CARE FIRST HOUR: CPT

## 2020-01-10 PROCEDURE — 10140 I&D HMTMA SEROMA/FLUID COLLJ: CPT | Mod: 78

## 2020-01-10 PROCEDURE — 49084 PERITONEAL LAVAGE: CPT | Mod: 78

## 2020-01-10 PROCEDURE — 71045 X-RAY EXAM CHEST 1 VIEW: CPT | Mod: 26

## 2020-01-10 PROCEDURE — 99291 CRITICAL CARE FIRST HOUR: CPT | Mod: 25

## 2020-01-10 PROCEDURE — 88307 TISSUE EXAM BY PATHOLOGIST: CPT | Mod: 26

## 2020-01-10 PROCEDURE — 88305 TISSUE EXAM BY PATHOLOGIST: CPT | Mod: 26

## 2020-01-10 PROCEDURE — 99232 SBSQ HOSP IP/OBS MODERATE 35: CPT

## 2020-01-10 RX ORDER — CHLORHEXIDINE GLUCONATE 213 G/1000ML
15 SOLUTION TOPICAL EVERY 12 HOURS
Refills: 0 | Status: DISCONTINUED | OUTPATIENT
Start: 2020-01-10 | End: 2020-01-11

## 2020-01-10 RX ORDER — SODIUM CHLORIDE 9 MG/ML
1000 INJECTION, SOLUTION INTRAVENOUS
Refills: 0 | Status: DISCONTINUED | OUTPATIENT
Start: 2020-01-10 | End: 2020-01-11

## 2020-01-10 RX ORDER — PANTOPRAZOLE SODIUM 20 MG/1
40 TABLET, DELAYED RELEASE ORAL DAILY
Refills: 0 | Status: DISCONTINUED | OUTPATIENT
Start: 2020-01-10 | End: 2020-01-12

## 2020-01-10 RX ORDER — FLUCONAZOLE 150 MG/1
400 TABLET ORAL EVERY 24 HOURS
Refills: 0 | Status: DISCONTINUED | OUTPATIENT
Start: 2020-01-10 | End: 2020-01-16

## 2020-01-10 RX ORDER — PHENYLEPHRINE HYDROCHLORIDE 10 MG/ML
0.5 INJECTION INTRAVENOUS
Qty: 40 | Refills: 0 | Status: DISCONTINUED | OUTPATIENT
Start: 2020-01-10 | End: 2020-01-10

## 2020-01-10 RX ORDER — PROPOFOL 10 MG/ML
50 INJECTION, EMULSION INTRAVENOUS
Qty: 1000 | Refills: 0 | Status: DISCONTINUED | OUTPATIENT
Start: 2020-01-10 | End: 2020-01-11

## 2020-01-10 RX ORDER — HYDROMORPHONE HYDROCHLORIDE 2 MG/ML
0.5 INJECTION INTRAMUSCULAR; INTRAVENOUS; SUBCUTANEOUS
Refills: 0 | Status: DISCONTINUED | OUTPATIENT
Start: 2020-01-10 | End: 2020-01-10

## 2020-01-10 RX ORDER — VANCOMYCIN HCL 1 G
1000 VIAL (EA) INTRAVENOUS ONCE
Refills: 0 | Status: COMPLETED | OUTPATIENT
Start: 2020-01-10 | End: 2020-01-10

## 2020-01-10 RX ORDER — MAGNESIUM SULFATE 500 MG/ML
2 VIAL (ML) INJECTION ONCE
Refills: 0 | Status: COMPLETED | OUTPATIENT
Start: 2020-01-10 | End: 2020-01-10

## 2020-01-10 RX ORDER — HYDROMORPHONE HYDROCHLORIDE 2 MG/ML
0.5 INJECTION INTRAMUSCULAR; INTRAVENOUS; SUBCUTANEOUS
Refills: 0 | Status: DISCONTINUED | OUTPATIENT
Start: 2020-01-10 | End: 2020-01-13

## 2020-01-10 RX ORDER — SODIUM CHLORIDE 9 MG/ML
1000 INJECTION INTRAMUSCULAR; INTRAVENOUS; SUBCUTANEOUS
Refills: 0 | Status: DISCONTINUED | OUTPATIENT
Start: 2020-01-10 | End: 2020-01-10

## 2020-01-10 RX ADMIN — MEROPENEM 100 MILLIGRAM(S): 1 INJECTION INTRAVENOUS at 21:18

## 2020-01-10 RX ADMIN — HYDROMORPHONE HYDROCHLORIDE 0.5 MILLIGRAM(S): 2 INJECTION INTRAMUSCULAR; INTRAVENOUS; SUBCUTANEOUS at 22:15

## 2020-01-10 RX ADMIN — HYDROMORPHONE HYDROCHLORIDE 0.5 MILLIGRAM(S): 2 INJECTION INTRAMUSCULAR; INTRAVENOUS; SUBCUTANEOUS at 22:30

## 2020-01-10 RX ADMIN — Medication 250 MILLIGRAM(S): at 15:55

## 2020-01-10 RX ADMIN — BRIMONIDINE TARTRATE 1 DROP(S): 2 SOLUTION/ DROPS OPHTHALMIC at 13:11

## 2020-01-10 RX ADMIN — HYDROMORPHONE HYDROCHLORIDE 0.5 MILLIGRAM(S): 2 INJECTION INTRAMUSCULAR; INTRAVENOUS; SUBCUTANEOUS at 04:47

## 2020-01-10 RX ADMIN — FLUCONAZOLE 100 MILLIGRAM(S): 150 TABLET ORAL at 07:29

## 2020-01-10 RX ADMIN — SODIUM CHLORIDE 30 MILLILITER(S): 9 INJECTION, SOLUTION INTRAVENOUS at 21:18

## 2020-01-10 RX ADMIN — LATANOPROST 1 DROP(S): 0.05 SOLUTION/ DROPS OPHTHALMIC; TOPICAL at 11:13

## 2020-01-10 RX ADMIN — Medication 4 MILLIGRAM(S): at 11:13

## 2020-01-10 RX ADMIN — HYDROMORPHONE HYDROCHLORIDE 0.5 MILLIGRAM(S): 2 INJECTION INTRAMUSCULAR; INTRAVENOUS; SUBCUTANEOUS at 04:32

## 2020-01-10 RX ADMIN — CHLORHEXIDINE GLUCONATE 15 MILLILITER(S): 213 SOLUTION TOPICAL at 17:07

## 2020-01-10 RX ADMIN — PROPOFOL 19.05 MICROGRAM(S)/KG/MIN: 10 INJECTION, EMULSION INTRAVENOUS at 05:42

## 2020-01-10 RX ADMIN — CHLORHEXIDINE GLUCONATE 1 APPLICATION(S): 213 SOLUTION TOPICAL at 05:43

## 2020-01-10 RX ADMIN — SODIUM CHLORIDE 30 MILLILITER(S): 9 INJECTION, SOLUTION INTRAVENOUS at 09:57

## 2020-01-10 RX ADMIN — PROPOFOL 19.05 MICROGRAM(S)/KG/MIN: 10 INJECTION, EMULSION INTRAVENOUS at 21:18

## 2020-01-10 RX ADMIN — PANTOPRAZOLE SODIUM 40 MILLIGRAM(S): 20 TABLET, DELAYED RELEASE ORAL at 11:13

## 2020-01-10 RX ADMIN — Medication 50 GRAM(S): at 05:43

## 2020-01-10 RX ADMIN — PROPOFOL 19.05 MICROGRAM(S)/KG/MIN: 10 INJECTION, EMULSION INTRAVENOUS at 07:29

## 2020-01-10 RX ADMIN — Medication 200 MILLIGRAM(S): at 09:57

## 2020-01-10 RX ADMIN — BRIMONIDINE TARTRATE 1 DROP(S): 2 SOLUTION/ DROPS OPHTHALMIC at 05:43

## 2020-01-10 RX ADMIN — BRIMONIDINE TARTRATE 1 DROP(S): 2 SOLUTION/ DROPS OPHTHALMIC at 21:18

## 2020-01-10 RX ADMIN — CHLORHEXIDINE GLUCONATE 15 MILLILITER(S): 213 SOLUTION TOPICAL at 05:43

## 2020-01-10 NOTE — PROGRESS NOTE ADULT - ASSESSMENT
49 year old male with ESRD on HD, now s/p ddrt on 12/8/2019 course complicated by TMA/profound ATN, who presents from a HD center with a fever and cough. He was initially admitted with influenza. He is s/p washout of infected collection and biopsy which revealed ATN.   He is now found to have a diffusely enlarging perinephric fluid collection and is awaiting drainage.    While on telemetry, he had an episode of a wide complex tachycardia. It initially appears irregular, suggesting an atrial arrhythmia with aberrancy, though the remainder appears more like an episode of non-sustained VT.  In the last 24 hours, he is s/p transplant nephrectomy and washout of retroperitoneum/hematoma evaluation with repair of bladder perforation, along with repair of right external iliac artery/vein and thrombectomy.    - now intubated and sedated.  - continue to watch on telemetry  - no additional arrhythmias in the past 24h, other than short lived PAT  - hold metoprolol in the setting of hypotension  - watch creatinine and electrolytes. Keep K>4, Mg>2  - can check echocardiogram. Last echocardiogram in 1/2019 with normal LV function      - now with dvt, with bleed on heparin gtt, this morning s/p repair of right external iliac artery/vein and thrombectomy  - labile hgb, trend carefully noting need for ac  - is at risk of abrupt decompensation noting multisystem abnormalities, wct, new dvt  - will follow with you

## 2020-01-10 NOTE — PROGRESS NOTE ADULT - ATTENDING COMMENTS
s/p DDRT 12/8/19, now s/p right transplant nephrectomy overnight for ruptured pseudoaneurysm of renal artery anastomosis.   external iliac artery stent removed at time of surgery. Still has IVC filter  remains intubated. weaned off pressors  RLE swollen. +dopplerable signal  Immunosuppression - cont prednisone 5mg daily. can d/c all other transplant meds  Cont abx and diflucan, f/u cultures  plan to start heparin gtt when no signs of bleeding

## 2020-01-10 NOTE — PROGRESS NOTE ADULT - SUBJECTIVE AND OBJECTIVE BOX
Subjective:  Patient martinez draining dark red urine, irrigated to clear.  Overnight, patient taken to OR after IR for explantation of transplanted kidney and repair of bladder perforation by transplant surgery. Vascular surgery called intra-op and repaired the right external iliac artery was repaired with a bovine pericardial patch, repaired the right external iliac vein, performed a thrombectomy of the right lower extremity veins.    Objectives:  T(C): 36.8 (01-10-20 @ 07:00), Max: 36.8 (01-10-20 @ 07:00)  HR: 70 (01-10-20 @ 09:06) (66 - 84)  BP: 111/56 (01-10-20 @ 07:00) (111/56 - 131/96)  RR: 12 (01-10-20 @ 09:06) (12 - 28)  SpO2: 100% (01-10-20 @ 09:06) (92% - 100%)  Wt(kg): --    01-09 @ 07:01  -  01-10 @ 07:00  --------------------------------------------------------  IN:    heparin Infusion: 120 mL    Packed Red Blood Cells: 700 mL    Plasma: 300 mL    Platelets - Single Donor: 225 mL    propofol Infusion: 34.3 mL    sodium chloride 2%: 500 mL    Solution: 350 mL  Total IN: 2229.3 mL    OUT:    Continuous Bladder Irrigation: 3000 mL    Indwelling Catheter - Urethral: 590 mL    Other: 1000 mL  Total OUT: 4590 mL    Total NET: -2360.7 mL      01-10 @ 07:01  -  01-10 @ 09:30  --------------------------------------------------------  IN:    propofol Infusion: 28.5 mL    Solution: 400 mL  Total IN: 428.5 mL    OUT:  Total OUT: 0 mL    Total NET: 428.5 mL          Physcial Exam  GENERAL: NAD, well-developed  ABDOMEN: Soft, Nontender, Nondistended; Bowel sounds present  GENITOURINARY: martinez draining dark red urine.   PSYCH: AAOx3    LABS:                        8.2    19.23 )-----------( 246      ( 10 [place] 2020 08:38 )             23.0     01-10    138  |  106  |  24<H>  ----------------------------<  87  4.2   |  21<L>  |  4.52<H>    Ca    7.6<L>      10 [place] 2020 08:38  Phos  5.1     01-10  Mg     2.7     01-10    TPro  3.5<L>  /  Alb  1.4<L>  /  TBili  2.0<H>  /  DBili  1.4<H>  /  AST  7<L>  /  ALT  5<L>  /  AlkPhos  88  01-10    CAPILLARY BLOOD GLUCOSE        PT/INR - ( 10 [place] 2020 04:40 )   PT: 14.0 sec;   INR: 1.22 ratio         PTT - ( 10 [place] 2020 04:40 )  PTT:30.3 sec  CAPILLARY BLOOD GLUCOSE

## 2020-01-10 NOTE — BRIEF OPERATIVE NOTE - NSICDXBRIEFPREOP_GEN_ALL_CORE_FT
PRE-OP DIAGNOSIS:  Renal artery pseudoaneurysm of kidney transplant 10-[place]-2020 02:58:03  Preston Poe P
PRE-OP DIAGNOSIS:  Delayed renal graft function 01-Jan-2020 11:17:04  Mehdi Bess
PRE-OP DIAGNOSIS:  Renal artery pseudoaneurysm of kidney transplant 10-[place]-2020 02:58:03  Preston Poe P

## 2020-01-10 NOTE — PROGRESS NOTE ADULT - SUBJECTIVE AND OBJECTIVE BOX
Transplant surgery Post Op note  --------------------------------------------------------------  R DDRT    Date:  12/8/19  Dr. Espinal, readmitted 12/27/19 with +Flu/perinephretic collection  Ex Lap washout/ renal bx  Date:  1/1/20        HPI: 49M PMHx of ESRD on HD  ( via LUE AVF), HTN, Gout, Glaucoma, NET of pancreas (s/p robotic distal panc/splenectomy at [place] 2015 by Dr. Young, followed by Dr. Raphael, [facility] Oncologist), RA with hand contractures. 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 Post op course c/b DGF requiring HD, thrombocytopenia, elevated LDH and Haptoglobulin. Schistocytes  on peripheral smear concerning for TMA. Envarsus held. Received Belatacept 12/13. Started on PLEX (12/12, 12/15). Underwent renal bx on 12/13 c/w profound ATN.  Found to have much improvement to levels, likely related to Tacrolimus. He was discharged home on 12/20/19 w/ outpatient HD.     Re-admitted 12/27 with influenza, completed treatement with Tamiflu on 12/31. Urine cx on admission grew Ent cloacae, was started on meropenem 12/31.   Renal US on admission with increasing hematoma. OR on 1/1 for ex-lap, hematoma evacuation, washout and renal bx. OR cxs growing Ent. clocae.  Prelim bx results c/w ATN, no rejection.     cxs:   12/27: BC x2: negative  12/29: Urine Cx: Enterobacter Cloacae  1/1 OR Perinephric collection Cx:  Carbapenem resistant Ent Cloacae, E. Coli  1/1: R kidney abscess swab: Enterobacter Cloacae  1/1  OR tissue: Enterobacter Cloacae  1/4: BC x2: ngtd  1/5: Urine Cx: Enteropbacter Cloacae  1/5: Skin incision Cx (bedside):  ngtd  1/6: Adominal fluid Cx from IR:  ngtd  1/7: Urine Cx:  ngtd  1/7: BCX2:  ngtd      Interval events:  OR: The site was thoroughly irrigated.  Clot was removed from the retro[peritoneum and bladder.  The ureter-to-bladder anastomosis was completely disrupted.  Dr. Strong and Dr. Brooke Roberts (please refer to their operative note for further details):  - repaired the right external iliac artery with a bovine pericardial patch   - repaired the right external iliac vein  - performed a thrombectomy of the right lower extremity veins  The stent placed by interventional radiology in the right external iliac artery was removed intra-operatively.           MEDICATIONS  (STANDING):  allopurinol 100 milliGRAM(s) Oral daily  brimonidine 0.2% Ophthalmic Solution 1 Drop(s) Both EYES three times a day  chlorhexidine 2% Cloths 1 Application(s) Topical <User Schedule>  ciprofloxacin   IVPB 400 milliGRAM(s) IV Intermittent every 24 hours  famotidine    Tablet 20 milliGRAM(s) Oral daily  fluconAZOLE   Tablet 400 milliGRAM(s) Oral daily  heparin  Infusion 1000 Unit(s)/Hr (12 mL/Hr) IV Continuous <Continuous>  latanoprost 0.005% Ophthalmic Solution 1 Drop(s) Both EYES daily  meropenem  IVPB 500 milliGRAM(s) IV Intermittent every 24 hours  oxybutynin 10 milliGRAM(s) Oral every 8 hours  polyethylene glycol 3350 17 Gram(s) Oral daily  predniSONE   Tablet 5 milliGRAM(s) Oral daily  senna 2 Tablet(s) Oral at bedtime  sodium chloride 2% . 1000 milliLiter(s) (50 mL/Hr) IV Continuous <Continuous>  trimethoprim   80 mG/sulfamethoxazole 400 mG 1 Tablet(s) Oral daily  valGANciclovir 450 milliGRAM(s) Oral <User Schedule>    MEDICATIONS  (PRN):  acetaminophen   Tablet .. 650 milliGRAM(s) Oral every 6 hours PRN Mild Pain (1 - 3)  lidocaine 2% Jelly 1 milliLiter(s) IntraUrethral two times a day PRN irritation  traMADol 50 milliGRAM(s) Oral every 6 hours PRN Severe Pain (7 - 10)  traMADol 25 milliGRAM(s) Oral every 6 hours PRN Moderate Pain (4 - 6)      PAST MEDICAL & SURGICAL HISTORY:  Erectile dysfunction  Glaucoma  Gout  HTN (hypertension)  ESRD (end stage renal disease) on dialysis: since 7/2013 otisajrrett campbell, sat  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  Carpal tunnel syndrome  Brain cyst: had MRI recently- now gone  Anemia  Gastric ulcer: Long time ago  Rheumatoid arthritis  Renal transplant recipient  Breakdown (mechanical) of penile (implanted) prosthesis, sequela  End-stage renal disease (ESRD): PERMACATH PLACEMENT  Abscess of left buttock: s/p I &amp; D  AV fistula: placement left lower arm  S/P cystoscopy: stent placement &amp; removal for kidney stones, lithiotripsy  s/p Lt Carpal tunnel: 2011        MEDICATIONS  (STANDING):  brimonidine 0.2% Ophthalmic Solution 1 Drop(s) Both EYES three times a day  chlorhexidine 0.12% Liquid 15 milliLiter(s) Oral Mucosa every 12 hours  chlorhexidine 2% Cloths 1 Application(s) Topical <User Schedule>  ciprofloxacin   IVPB 400 milliGRAM(s) IV Intermittent every 24 hours  fluconAZOLE IVPB 400 milliGRAM(s) IV Intermittent every 24 hours  latanoprost 0.005% Ophthalmic Solution 1 Drop(s) Both EYES daily  meropenem  IVPB 500 milliGRAM(s) IV Intermittent every 24 hours  pantoprazole  Injectable 40 milliGRAM(s) IV Push daily  phenylephrine    Infusion 0.5 MICROgram(s)/kG/Min (11.906 mL/Hr) IV Continuous <Continuous>  polyethylene glycol 3350 17 Gram(s) Oral daily  propofol Infusion 50 MICROgram(s)/kG/Min (19.05 mL/Hr) IV Continuous <Continuous>  trimethoprim   80 mG/sulfamethoxazole 400 mG 1 Tablet(s) Oral daily  valGANciclovir 450 milliGRAM(s) Oral <User Schedule>    MEDICATIONS  (PRN):  HYDROmorphone  Injectable 0.5 milliGRAM(s) IV Push every 2 hours PRN Moderate Pain (4 - 6)  lidocaine 2% Jelly 1 milliLiter(s) IntraUrethral two times a day PRN irritation            Vital Signs Last 24 Hrs  T(C): 36.7 (10 [place] 2020 04:00), Max: 36.9 (09 Jan 2020 15:00)  T(F): 98.1 (10 [place] 2020 04:00), Max: 98.4 (09 Jan 2020 15:00)  HR: 76 (10 [place] 2020 07:00) (64 - 101)  BP: 131/96 (10 [place] 2020 04:00) (115/57 - 222/94)  BP(mean): 106 (10 [place] 2020 04:00) (80 - 135)  RR: 12 (10 [place] 2020 07:00) (11 - 29)  SpO2: 100% (10 [place] 2020 07:00) (90% - 100%)    I&O's Summary    09 Jan 2020 07:01  -  10 [place] 2020 07:00  --------------------------------------------------------  IN: 2229.3 mL / OUT: 4590 mL / NET: -2360.7 mL                              10.1   22.10 )-----------( 178      ( 10 [place] 2020 04:40 )             28.9     01-10    135  |  105  |  22  ----------------------------<  94  4.2   |  18<L>  |  4.21<H>    Ca    7.2<L>      10 [place] 2020 04:40  Phos  5.3     01-10  Mg     1.9     01-10    TPro  3.5<L>  /  Alb  1.4<L>  /  TBili  2.0<H>  /  DBili  1.4<H>  /  AST  7<L>  /  ALT  5<L>  /  AlkPhos  88  01-10                  Culture - Blood (collected 01-07-20 @ 13:23)  Source: .Blood Blood-Venous  Preliminary Report (01-08-20 @ 14:02):    No growth to date.    Culture - Blood (collected 01-07-20 @ 13:23)  Source: .Blood Blood-Venous  Preliminary Report (01-08-20 @ 14:02):    No growth to date.    Culture - Urine (collected 01-07-20 @ 00:18)  Source: .Urine Catheterized  Final Report (01-07-20 @ 21:22):    <10,000 CFU/mL Normal Urogenital Gillian    Culture - Body Fluid with Gram Stain (collected 01-06-20 @ 21:03)  Source: Abdominal Fl Abdominal Fluid  Gram Stain (01-06-20 @ 23:41):    No polymorphonuclear leukocytes seen    No organisms seen    by cytocentrifuge  Preliminary Report (01-07-20 @ 17:27):    No growth    Culture - Other (collected 01-05-20 @ 13:59)  Source: Skin from incision  Final Report (01-07-20 @ 11:15):    No growth at 48 hours    Culture - Urine (collected 01-05-20 @ 13:59)  Source: .Urine Clean Catch (Midstream)  Final Report (01-07-20 @ 20:09):    >100,000 CFU/ml Enterobacter cloacae  Organism: Enterobacter cloacae (01-07-20 @ 20:09)  Organism: Enterobacter cloacae (01-07-20 @ 20:09)    Culture - Blood (collected 01-04-20 @ 13:07)  Source: .Blood Blood  Final Report (01-09-20 @ 14:01):    No growth at 5 days.    Culture - Blood (collected 01-04-20 @ 13:07)  Source: .Blood Blood  Final Report (01-09-20 @ 14:01):    No growth at 5 days.        Culture - Blood (collected 01-07-20 @ 13:23)  Source: .Blood Blood-Venous  Preliminary Report (01-08-20 @ 14:02):    No growth to date.    Culture - Blood (collected 01-07-20 @ 13:23)  Source: .Blood Blood-Venous  Preliminary Report (01-08-20 @ 14:02):    No growth to date.    Culture - Urine (collected 01-07-20 @ 00:18)  Source: .Urine Catheterized  Final Report (01-07-20 @ 21:22):    <10,000 CFU/mL Normal Urogenital Gillian    Culture - Body Fluid with Gram Stain (collected 01-06-20 @ 21:03)  Source: Abdominal Fl Abdominal Fluid  Gram Stain (01-06-20 @ 23:41):    No polymorphonuclear leukocytes seen    No organisms seen    by cytocentrifuge  Preliminary Report (01-07-20 @ 17:27):    No growth    Culture - Other (collected 01-05-20 @ 13:59)  Source: Skin from incision  Final Report (01-07-20 @ 11:15):    No growth at 48 hours    Culture - Urine (collected 01-05-20 @ 13:59)  Source: .Urine Clean Catch (Midstream)  Final Report (01-07-20 @ 20:09):    >100,000 CFU/ml Enterobacter cloacae  Organism: Enterobacter cloacae (01-07-20 @ 20:09)  Organism: Enterobacter cloacae (01-07-20 @ 20:09)    Culture - Blood (collected 01-04-20 @ 13:07)  Source: .Blood Blood  Preliminary Report (01-05-20 @ 14:01):    No growth to date.    Culture - Blood (collected 01-04-20 @ 13:07)  Source: .Blood Blood  Preliminary Report (01-05-20 @ 14:01):    No growth to date.      EXAM:  DUPLEX EXT VEINS LOWER RT                  PROCEDURE DATE:  01/08/2020    INTERPRETATION:  CLINICAL INFORMATION: Right lower extremity swelling. Patient is status post kidney transplant.    COMPARISON: A prior study, dated 1/4/2020, showed no DVT.    TECHNIQUE: Duplex sonography of the RIGHT LOWER extremity veins with color and spectral Doppler, with and without compression.      FINDINGS: There is an enlarged right inguinal lymph node.    There is acute, echogenic, partially occlusive DVT affecting the right external iliac vein and common femoral vein.    There is occlusive thrombus affecting the right femoral vein in the proximal thigh.    The right popliteal vein is patent and free of thrombus.    The right posterior tibial and peroneal veins are patent in the calf.    There is acute DVT affecting the right soleal vein in the calf.    The contralateral common femoral vein is patent.  IMPRESSION:   There is acute above-the-knee thrombus affecting the right external iliac, common femoral and femoral veins.  There is acute calf vein thrombus affecting the right soleal vein.        XAM:  US KIDNEY TRANSPLANT W DOPP RT             PROCEDURE DATE:  01/07/2020    INTERPRETATION:  CLINICAL INFORMATION: Hematuria with elevated creatinine to 7.4, status post renal transplant on 12/8/2019 complicated by peritransplant hematoma    COMPARISON: Renal ultrasound from 12/31/2019 and CT abdomen and pelvis from 1/4/2020    TECHNIQUE: Grayscale, Color and spectral Doppler evaluation of a RIGHT renal transplant.     FINDINGS:    Renal Transplant: 11.8 cm. Small amount of debris within the collecting system decreased compared to prior exam 12/31/2019 with redemonstration of pelvic fullness, also not significantly changed. Perinephric hematoma measures 9.6 4.0 x 7.0, not significantly changed in size since the prior exam although decreased in echogenicity most likely representing liquefaction.    Urinary bladder: Collapsed around a Kennedy catheter.    Color and spectral Doppler reveals homogeneous flow throughout the transplant.    Peak iliac artery velocity is 179 cm/sec pre-anastomosis, 277 cm/sec at the anastomosis, and 188 cm/sec post anastomosis.    Transplant Renal Artery:   Peak systolic velocity is 281 cm/sec at the anastomosis    At the superior branch: 193 cm/sec proximal, 114 cm/sec mid, 113 cm/sec distal and 32 cm/sec hilum.     At the inferior branch: 248 cm/sec proximal, 79 cm/sec mid, 52 cm/sec distal and 47 cm/sec hilum.     Resistive Indices Range: 0.8 - 0.9    Transplant Renal Vein: Patent..    IMPRESSION:     Perinephric hematoma not significantly changed in size with decreased echogenicity likely related to liquefaction.    Peak systolic velocity at the transplant renal artery anastomosis has decreased from 444 cm to 281 cm/s since 12/31/2019. Continued follow-up is recommended.      REVIEW OF SYSTEMS  Unable to obtain  pt intubated and sedated     PHYSICAL EXAM:    Constitutional: Well developed / well nourished  	Eyes: Anicteric, PERRLA  	ENMT: nc/at   	Neck: Supple  	Respiratory: CTA     	Cardiovascular: RRR  	Gastrointestinal: Soft abdomen, tender to palpation, ND. incision w/ serosanguinous drainage, BO w/ minimal drainage    Genitourinary:  Kennedy    Extremities: SCD's in place and working bilaterally  	Vascular: doppler signal R DP and PT, faintly palpable L DP.  3+ pitting edema RLE, 1+LLE   	Neurological: sedated, arousable  to stimuli    	Skin: RLQ dressing intact with SS drainage noted. BO patent with scant output   	Musculoskeletal: Moving all extremities    Psychiatric: Responsive

## 2020-01-10 NOTE — PROGRESS NOTE ADULT - ASSESSMENT
KAYLA PA is a 49y Male with history of ESRD on HD  (via LUE AVF), HTN, Gout, Glaucoma, PNET s/p robotic distal panc/splenectomy (at [place] 2015 by Dr. Young, followed by Dr. Raphael, [facility] Oncologist), RA with hand contractures who underwent DDRT on 12/8/19 (ureteral stent sutured to Kennedy - removed 12/15). Post op course c/b DGF requiring HD, thrombocytopenia, elevated LDH and Haptoglobulin. Schistocytes  on peripheral smear concerning for TMA. Envarsus held. Underwent renal bx on 12/13 c/w profound ATN. 1/1/2020 was taken to OR and found to have a perinephric hematoma. Underwent washout, evacuation of hematoma, and biopsy of transplanted kidney. He was recovering on the floor when overnight he spiked a fever to 38.1 and had 18 beats of wide complex tachycardia. He was also found to have worsening leukocytosis and became acutely hypotensive. A CT scan was obtained deomnstrating a 4.5x3.7 perinephric collection. IR was consulted for drainage and SICU was consulted for hemodynamic monitoring.    PLAN:    NEURO:  - Tylenol 650 mg q6 prn (mild)  - Tramadol 25/50 mg q6 prn (mod/severe)  - Gabapentin 100 mg BID  - Dilaudid 0.25mg for breakthrough pain.    RESPIRATORY:   - No active issues  - OOB as tolerated, PT    CARDIOVASCULAR:  - Monitor hemodynamics  - f/u cards recs for wide complex tachycardia  - Hold home hydralazine, metoprolol    GI/NUTRITION:  - NPO  - Famotidine 20 mg daily  - Miralax daily and senna 2 tabs qhs  - Transplant meds: prednisone 5 mg daily, allopurinol 100 mg daily  - MMF per transplant team    GENITOURINARY/RENAL:  - Monitor electrolytes  - Continue w/ HD for now  - f/u renal biopsy pathology  - S/p kidney explantation   - Oxybutinin 10 mg q8h    HEMATOLOGIC:  - f/u PTT  - f/u vasc recs  - Trend H/H    INFECTIOUS DISEASE:  - Meropenem 500 mg daily  - Fluconazole 400 mg daily  - PPX: valganciclovir 450 mg three times weekly, bactrim 1 tab daily  - Appreciate ID input  -redose of amikacin     ENDOCRINE:  - Monitor glucose on BMP    OPHTHALMOLOGY:  - Brimonidine/latanoprost drops KAYLA PA is a 49y Male with ESRD s/p DDRT on 12/8/19 (ureteral stent sutured to Kennedy - removed 12/15). Post op course c/b DGF requiring HD, thrombocytopenia, elevated LDH and Haptoglobulin. Schistocytes  on peripheral smear concerning for TMA. Envarsus held. Underwent renal bx on 12/13 c/w profound ATN. 1/1/2020 was taken to OR and found to have a perinephric hematoma. Underwent washout, evacuation of hematoma, and biopsy of transplanted kidney. He was recovering on the floor when overnight he spiked a fever to 38.1 and had 18 beats of wide complex tachycardia. He was also found to have worsening leukocytosis and became acutely hypotensive. A CT scan was obtained deomnstrating a 4.5x3.7 perinephric collection. IR was consulted for drainage and SICU was consulted for hemodynamic monitoring.    PLAN:    NEURO:  - Tylenol 650 mg q6 prn (mild)  - Tramadol 25/50 mg q6 prn (mod/severe)  - Gabapentin 100 mg BID  - Dilaudid 0.25mg for breakthrough pain.    RESPIRATORY:   - No active issues  - OOB as tolerated, PT    CARDIOVASCULAR:  - Monitor hemodynamics  - f/u cards recs for wide complex tachycardia  - Hold home hydralazine, metoprolol    GI/NUTRITION:  - NPO  - Famotidine 20 mg daily  - Miralax daily and senna 2 tabs qhs  - Transplant meds: prednisone 5 mg daily, allopurinol 100 mg daily  - MMF per transplant team    GENITOURINARY/RENAL:  - Monitor electrolytes  - Continue w/ HD for now  - f/u renal biopsy pathology  - S/p kidney explantation   - Oxybutinin 10 mg q8h    HEMATOLOGIC:  - f/u PTT  - f/u vasc recs  - Trend H/H    INFECTIOUS DISEASE:  - Meropenem 500 mg daily  - Fluconazole 400 mg daily  - PPX: valganciclovir 450 mg three times weekly, bactrim 1 tab daily  - Appreciate ID input  -redose of amikacin     ENDOCRINE:  - Monitor glucose on BMP    OPHTHALMOLOGY:  - Brimonidine/latanoprost drops KAYLA PA is a 49y Male with ESRD s/p DDRT on 12/8/19 with course complicated by delayed graft function requiring HD, and infected perinephric hematoma s/p washout on 1/1/2020 with worsening sepsis and transfer to ICU, s/p IR drainage of perinephric collection on 1/6. 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 He underwent operative repair of right external iliac artery with pericardial patch, transplant nephrectomy, RLE thrombectomy, and IVC filter on 1/9.     PLAN:    NEURO: acute pain  - Propofol for sedation  - Dilaudid prn for breakthrough pain.    RESPIRATORY:  acute respiratory failure  - Continue mechanical ventilation  - If CBC remains stable, attempt SBT later in afternoon    CARDIOVASCULAR:   - Monitor hemodynamics  - Holding home hydralazine, metoprolol    GI/NUTRITION: no acute issues  - NPO   - Protonix for stress ulcer prophylaxis    GENITOURINARY/RENAL: s/p transplant nephrectomy   - D5 NS @ 30 ml/hr  - Monitor electrolytes  - Continue w/ HD for now  - f/u renal biopsy pathology  - Transplant meds: solumedrol IV 4 mg daily    HEMATOLOGIC: acute hemorrhagic anemia  - Trend CBC q4, coags q8    INFECTIOUS DISEASE: infected perinephric hematoma s/p explantation, washout  - Meropenem 500 mg daily  - Fluconazole 400 mg daily  - Ciprofloxacin 400mg daily    ENDOCRINE: no acute issues  - Monitor glucose on BMP    OPHTHALMOLOGY:  - Brimonidine/latanoprost drops

## 2020-01-10 NOTE — PROGRESS NOTE ADULT - ASSESSMENT
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 Young, followed by Dr. Raphael, [facility] Oncologist), RA with hand contractures. 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 Post op course c/b DGF requiring HD, thrombocytopenia, elevated LDH and Haptoglobulin. Schistocytes  on peripheral smear concerning for TMA. Envarsus held. Received Belatacept 12/13. Started on PLEX (12/12, 12/15). Underwent renal bx on 12/13 c/w profound ATN. Re-admitted with Influenza,  s/p hematoma evacuation/ abd washout and renal biopsy now with leukocytosis and fevers and acute DVT of R external iliac, common femoral, femoral veins, R soleal vein requiring heparin drip. Now S/P nephrectomy.         Plan:   -Continue Abx  -SBT in AM   -SICU for care  -Trend Labs   -Team to round in AM

## 2020-01-10 NOTE — PROGRESS NOTE ADULT - ATTENDING COMMENTS
Evaluated in AM round  Went over the events in detail  S/p nephrectomy and evacuation of RP hematoma  S/p IVC filter placement  - repaired the right external iliac artery with a bovine pericardial patch   - repaired the right external iliac vein  - performed a thrombectomy of the right lower extremity veins  The stent placed by interventional radiology in the right external iliac artery removed    Acute resp failure post op, will keep him intubated for now, vent adjustment  Serial CBC, transfused one unit for drop in HCT, full dose AC on hold for concern for rebleed  Vascular checks  NPO, start gentler hydration  IV abx Meropenem and Cipro  Adjustment of all transplant meds  Possible HD in AM  Discussed with multidisciplinary team

## 2020-01-10 NOTE — CHART NOTE - NSCHARTNOTEFT_GEN_A_CORE
12/8/2019 - right iliac DDRT for ESRD secondary to glomerulonephritis  1/1/2020 - evacuation of infected retroperitoneal hematoma around renal allograft  1/10/2020 - transplant nephrectomy, right external iliac artery repair, right external iliac vein repair and embolectomy      acute respiratory insufficiency  -12 / 500 / 30% / +5  =>  7.39 / 37 / 157 / 22  -continue mechanical ventilation  -propofol infusion for sedation    acute blood loss anemia  chronic anemia from CKD  -hgb 7.7 -> 8.9 g/dL after 1u RBC transfusion today    ESBL Enterobacter UTI / infected retroperitoneal hematoma  -meropenem / ciprofloxacin  -f/u tissue cultures from 1/10/2020 transplant nephrectomy    ESRD  -HD TThSat      I have personally provided 35 minutes of critical care time excluding time spent on separate procedures.

## 2020-01-10 NOTE — BRIEF OPERATIVE NOTE - OPERATION/FINDINGS
This was an intraoperative consult by the transplant surgery team for actively bleeding pseduoaneurysm of the renal artery-ext iliac artery anastomosis. Complete procedure details are in the transplant surgery note   - Evacuation of RP heamtoma   - Removal of external iliac artery stent   - Bovine pericardial patch repair of ext iliac artery  - Ligation of external iliac vein

## 2020-01-10 NOTE — PROGRESS NOTE ADULT - ASSESSMENT
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 Post op course c/b DGF s/p renal bx on 12/13 c/b perirenal hematoma/abscess s/p washout 1/1. Patient is s/p IR aspiration of persistent collection seen inferior to pelvic kidney, now with new-onset gross hematuria. Patient taken to OR after IR for explantation of transplanted kidney and repair of bladder perforation. Vascular surgery called intra-op and repaired the right external iliac artery was repaired with a bovine pericardial patch, repaired the right external iliac vein, performed a thrombectomy of the right lower extremity veins  - Unclear level of urine production following explant of transplant kidney. Will monitor UOP.  - Monitor urine color, if catheter not draining, irrigate martinez PRN and check bladder scan to determine urine volume.  - Will continue to follow.

## 2020-01-10 NOTE — PROGRESS NOTE ADULT - SUBJECTIVE AND OBJECTIVE BOX
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 Pt. overall with complicated hospital course, including persistent hematuria, RLE DVT, and DGF. Had transplant nephrectomy overnight, along with endovascular repair and IVC filter placement.    Pt. seen and examined at bedside this AM in SICU. Pt. remains intubated and sedated post transplant nephrectomy. Unable to complete ROS.    PAST HISTORY  --------------------------------------------------------------------------------  No significant changes to PMH, PSH, FHx, SHx, unless otherwise noted    ALLERGIES & MEDICATIONS  --------------------------------------------------------------------------------  Allergies    coconut (Anaphylaxis)  morphine (Pruritus; Rash)    Intolerances    Standing Inpatient Medications  brimonidine 0.2% Ophthalmic Solution 1 Drop(s) Both EYES three times a day  chlorhexidine 0.12% Liquid 15 milliLiter(s) Oral Mucosa every 12 hours  chlorhexidine 2% Cloths 1 Application(s) Topical <User Schedule>  ciprofloxacin   IVPB 400 milliGRAM(s) IV Intermittent every 24 hours  dextrose 5% + sodium chloride 0.9%. 1000 milliLiter(s) IV Continuous <Continuous>  fluconAZOLE IVPB 400 milliGRAM(s) IV Intermittent every 24 hours  latanoprost 0.005% Ophthalmic Solution 1 Drop(s) Both EYES daily  meropenem  IVPB 500 milliGRAM(s) IV Intermittent every 24 hours  methylPREDNISolone sodium succinate Injectable 4 milliGRAM(s) IV Push daily  pantoprazole  Injectable 40 milliGRAM(s) IV Push daily  propofol Infusion 50 MICROgram(s)/kG/Min IV Continuous <Continuous>  vancomycin  IVPB 1000 milliGRAM(s) IV Intermittent once    REVIEW OF SYSTEMS  --------------------------------------------------------------------------------  Unable to obtain.    VITALS/PHYSICAL EXAM  --------------------------------------------------------------------------------  T(C): 36.1 (01-10-20 @ 15:00), Max: 36.9 (01-10-20 @ 11:00)  HR: 75 (01-10-20 @ 15:00) (64 - 101)  BP: 111/56 (01-10-20 @ 07:00) (111/56 - 222/94)  RR: 14 (01-10-20 @ 15:00) (12 - 29)  SpO2: 100% (01-10-20 @ 15:00) (90% - 100%)  Wt(kg): --  Height (cm): 180.34 (01-09-20 @ 20:33)  Weight (kg): 63.5 (01-09-20 @ 20:33)  BMI (kg/m2): 19.5 (01-09-20 @ 20:33)  BSA (m2): 1.81 (01-09-20 @ 20:33)    01-09-20 @ 07:01  -  01-10-20 @ 07:00  --------------------------------------------------------  IN: 2229.3 mL / OUT: 4590 mL / NET: -2360.7 mL    01-10-20 @ 07:01  -  01-10-20 @ 15:52  --------------------------------------------------------  IN: 1238.3 mL / OUT: 0 mL / NET: 1238.3 mL    Physical Exam:  	Gen: intubated/sedated  	HEENT: +ETT  	Pulm: + mechanical breath sounds  	CV: RRR, S1S2; no rub  	Abd: +BS, soft, surgically dressed.  	LE: +1-2 b/l pitting ankle edema, RLE edema  	Neuro: No focal deficits  	Psych: Normal affect and mood  	Skin: Warm, without rashes    LABS/STUDIES  --------------------------------------------------------------------------------              7.7    16.80 >-----------<  261      [01-10-20 @ 13:07]              21.9     134  |  103  |  24  ----------------------------<  93      [01-10-20 @ 13:07]  4.3   |  21  |  4.79        Ca     7.4     [01-10-20 @ 13:07]      Mg     2.4     [01-10-20 @ 13:07]      Phos  5.2     [01-10-20 @ 13:07]    Creatinine Trend:  SCr 4.79 [01-10 @ 13:07]  SCr 4.52 [01-10 @ 08:38]  SCr 4.21 [01-10 @ 04:40]  SCr 6.71 [01-09 @ 14:15]  SCr 6.57 [01-09 @ 10:18]

## 2020-01-10 NOTE — PROGRESS NOTE ADULT - ASSESSMENT
49 year old male PMH ESRD on HD ( via LUE AVF) s/p DDRT 12/8/19 (CMV -/+, EBV -/+), NET of pancreas (s/p robotic distal panc/splenectomy 2015), RA with hand contractures who presented to [facility] on 12/27 with cough and fever.     RVP with Influenza A.   CXR Clear  s/p Tamiflu treatment course    UCx with > 100K Enterobacter  s/p Ex-Lap and washout of infected hematoma on 1/1  Growth of Enterobacter from surgical cultures  2 of the isolates intermediate/resistant to Ertapenem but rest susceptible  Unclear significance of this - would continue meropenem at this time    s/p IR guided drainage attempt of hematoma on 1/6  Significant hematuria with clots after procedure    On 1/10 underwent transplant nephrectomy, repair of right external iliac artery with a bovine pericardial patch, repair of right external iliac vein and thrombectomy of right lower extremity veins.     Possible infected pseudoaneurysm and thrombosis around transplant kidney.   Multiple surgical cultures sent - I would follow up on result  I suspect Enterobacter is the primary pathogen  Can continue current regimen pending cultures - I would hold off on further Vancomycin    Overall, Leukocytosis, Infected Hematoma, Positive Urine Culture, Influenza Infection, Fever, Renal Transplant Recipient    --Continue Ciprofloxacin 400 mg IV Q24  --Continue Meropenem 500 mg IV Q24H  --Can continue fluconazole pending surgical cultures but suspicion low for fungal etiology  --I would hold off on further Vancomycin  --Continue to follow CBC with diff  --Continue to follow temperature curve  --Follow up on surgical cultures   --Follow up on prelim blood cultures    I will continue to follow. Please feel free to contact me with any further questions.    Dada Jacobo M.D.  [facility] Division of Infectious Disease  8AM-5PM: Pager Number [phone]  After Hours (or if no response): Please contact the Infectious Diseases Office at [phone]

## 2020-01-10 NOTE — PROGRESS NOTE ADULT - ASSESSMENT
49y Male with history of ESRD on HD  (via LUE AVF), HTN, Gout, Glaucoma, PNET s/p robotic distal panc/splenectomy (at [place] 2015 by Dr. Young, followed by Dr. Raphael, [facility] Oncologist), RA with hand contractures who underwent DDRT on 12/8/19 (ureteral stent sutured to Kennedy - removed 12/15). Post op course c/b DGF requiring HD and TMA. Underwent renal bx on 12/13 c/w profound ATN. 1/1/2020 was taken to OR and found to have a perinephric hematoma. Underwent washout, evacuation of hematoma, and biopsy of transplanted kidney. POD4 a CT scan was obtained demonstrating a 4.5x3.7 perinephric collection. IR was consulted for drainage and SICU was consulted for hemodynamic monitoring. 1/8 found to have RLE DVT involving ext iliac, common fem veins. 1/10- Patient taken to OR after IR for explantation of transplanted kidney and repair of bladder perforation. Vascular surgery called intra-op and repaired the right external iliac artery was repaired with a bovine pericardial patch, repaired the right external iliac vein, performed a thrombectomy of the right lower extremity veins.        Plan:   - Routine neurovascular checks  - Continue care as per transplant/SICU teams    VASCULAR [id number] 49y Male with history of ESRD on HD  (via LUE AVF), HTN, Gout, Glaucoma, PNET s/p robotic distal panc/splenectomy (at [place] 2015 by Dr. Young, followed by Dr. Raphael, [facility] Oncologist), RA with hand contractures who underwent DDRT on 12/8/19 (ureteral stent sutured to Kennedy - removed 12/15). Post op course c/b DGF requiring HD and TMA. Underwent renal bx on 12/13 c/w profound ATN. 1/1/2020 was taken to OR and found to have a perinephric hematoma. Underwent washout, evacuation of hematoma, and biopsy of transplanted kidney. POD4 a CT scan was obtained demonstrating a 4.5x3.7 perinephric collection. IR was consulted for drainage and SICU was consulted for hemodynamic monitoring. 1/8 found to have RLE DVT involving ext iliac, common fem veins. 1/10- Patient taken to OR after IR for explantation of transplanted kidney and repair of bladder perforation. Vascular surgery called intra-op and repaired the right external iliac artery was repaired with a bovine pericardial patch, repaired the right external iliac vein, performed a thrombectomy of the right lower extremity veins.        Plan:   - Routine neurovascular checks  - Can resume  AC  tomorrow (1/11)  slowly with  unfractionated heparin  ptt in the  range of  60  - Continue care as per transplant/SICU teams    VASCULAR [id number]

## 2020-01-10 NOTE — BRIEF OPERATIVE NOTE - NSICDXBRIEFPROCEDURE_GEN_ALL_CORE_FT
PROCEDURES:  Nephrectomy, transplant 10-[place]-2020 03:00:00 1- Repair of urinary bladder  2- Washout of retro-peritoneum Preston Poe
PROCEDURES:  Biopsy of transplanted kidney 01-Jan-2020 11:16:31  Mehdi Bess  Evacuation of hematoma of right groin 01-Jan-2020 11:16:13  Mehdi Bess  Exploration, groin 01-Jan-2020 11:15:41  Mehdi Bess
PROCEDURES:  Nephrectomy, transplant 10-[place]-2020 03:00:00 1- Repair of urinary bladder  2- Washout of retro-peritoneum Preston Poe  Evacuation of hematoma of right groin 01-Jan-2020 11:16:13  Mehdi Bess  Exploration, groin 01-Jan-2020 11:15:41  Mehdi Bess

## 2020-01-10 NOTE — PROGRESS NOTE ADULT - PROBLEM SELECTOR PLAN 2
- Persistent but improved leukocytosis s/p transplant nephrectomy.   - continue prednisone to 5 mg daily

## 2020-01-10 NOTE — BRIEF OPERATIVE NOTE - OPERATION/FINDINGS
Dr. Strong and Dr. Brooke Roberts:  - repaired the external iliac artery with a bovine pericardial patch   - repaired the external iliac vein  - performed a thrombectomy of the right lower extremity veins  Please refer to their operative note for further details. The site was thoroughly irrigated.  Clot was removed from the retro[peritoneum and bladder.  The ureter-to-bladder anastomosis was completely disrupted.  Dr. Strong and Dr. Brooke Roberts (please refer to their operative note for further details):  - repaired the right external iliac artery with a bovine pericardial patch   - repaired the right external iliac vein  - performed a thrombectomy of the right lower extremity veins  The stent placed by interventional radiology in the right external iliac artery was removed intra-operatively. This case was an emergency.  I had obtained consent from patient prior to IR procedure.  I had also attempted to contact relatives but did not obtain a response when I called by phone.  I asked Dr. Carson and Dr. Espinal to be involved in the case given its complexity.  The site was thoroughly irrigated.  Clot was removed from the retro[peritoneum and bladder.  The ureter-to-bladder anastomosis was completely disrupted.  Dr. Strong and Dr. Brooke Roberts (please refer to their operative note for further details):  - repaired the right external iliac artery with a bovine pericardial patch   - repaired the right external iliac vein  - performed a thrombectomy of the right lower extremity veins  The stent placed by interventional radiology in the right external iliac artery was removed intra-operatively.

## 2020-01-10 NOTE — PROGRESS NOTE ADULT - SUBJECTIVE AND OBJECTIVE BOX
Intubated in SICU, martinez w/scant bloody urine    ICU Vital Signs Last 24 Hrs  T(C): 36.9 (10 [place] 2020 11:00), Max: 36.9 (09 Jan 2020 15:00)  T(F): 98.4 (10 [place] 2020 11:00), Max: 98.4 (09 Jan 2020 15:00)  HR: 74 (10 [place] 2020 14:00) (64 - 101)  ABP: 122/57 (10 [place] 2020 14:00) (99/47 - 133/65)  ABP(mean): 82 (10 [place] 2020 14:00) (67 - 91)  RR: 12 (10 [place] 2020 14:00) (12 - 29)  SpO2: 100% (10 [place] 2020 14:00) (90% - 100%)    Card S1S2  Lungs coarse BS b/l  Abd soft  Extr + edema RLE, AVF patent                                                                                  7.7    16.80 )-----------( 261      ( 10 [place] 2020 13:07 )             21.9     10 [place] 2020 13:07    134    |  103    |  24     ----------------------------<  93     4.3     |  21     |  4.79     Ca    7.4        10 [place] 2020 13:07  Phos  5.2       10 [place] 2020 13:07  Mg     2.4       10 [place] 2020 13:07    TPro  3.5    /  Alb  1.4    /  TBili  2.0    /  DBili  1.4    /  AST  7      /  ALT  5      /  AlkPhos  88     10 [place] 2020 04:40    LIVER FUNCTIONS - ( 10 [place] 2020 04:40 )  Alb: 1.4 g/dL / Pro: 3.5 g/dL / ALK PHOS: 88 U/L / ALT: 5 U/L / AST: 7 U/L / GGT: x           PT/INR - ( 10 [place] 2020 13:07 )   PT: 13.6 sec;   INR: 1.18 ratio      CARDIAC MARKERS ( 10 [place] 2020 04:40 )  x     / x     / 58 U/L / x     / x        brimonidine 0.2% Ophthalmic Solution 1 Drop(s) Both EYES three times a day  chlorhexidine 0.12% Liquid 15 milliLiter(s) Oral Mucosa every 12 hours  chlorhexidine 2% Cloths 1 Application(s) Topical <User Schedule>  ciprofloxacin   IVPB 400 milliGRAM(s) IV Intermittent every 24 hours  dextrose 5% + sodium chloride 0.9%. 1000 milliLiter(s) IV Continuous <Continuous>  fluconAZOLE IVPB 400 milliGRAM(s) IV Intermittent every 24 hours  HYDROmorphone  Injectable 0.5 milliGRAM(s) IV Push every 2 hours PRN  latanoprost 0.005% Ophthalmic Solution 1 Drop(s) Both EYES daily  lidocaine 2% Jelly 1 milliLiter(s) IntraUrethral two times a day PRN  meropenem  IVPB 500 milliGRAM(s) IV Intermittent every 24 hours  methylPREDNISolone sodium succinate Injectable 4 milliGRAM(s) IV Push daily  pantoprazole  Injectable 40 milliGRAM(s) IV Push daily  polyethylene glycol 3350 17 Gram(s) Oral daily  propofol Infusion 50 MICROgram(s)/kG/Min IV Continuous <Continuous>    A/P:    Adm w/Flu A 12/27  Hx ESRD on HD  S/p DDRT 12/8/2019, DGF  S/p transplant kidney bx 12/13: ATN, focal TMA  S/p PLEX 12/13 and 12/15  Carlos-nephric hematoma noted on ultrasound 12/27, increased from prior  S/p OR 1/1 for washout and repeat renal graft biopsy  Repeat renal bx c/w ATN  Infected carlos-nephric hematoma, s/p drainage w/IR  RLE DVT  Gross hematuria  S/p OR this am for infected retro-peritoneal hematoma, hemorrhage involving pseudo-aneurysm of anastomosis of renal artery to external iliac vein,  s/p transplant nephrectomy  S/p IVCF   Abx per ID  HD tomorrow  Will follow

## 2020-01-10 NOTE — BRIEF OPERATIVE NOTE - ANTIBIOTIC PROTOCOL
Followed protocol
Followed protocol/500 mg IV meropenem and 500 mg IV cipro less than 30 minutes prior to incision

## 2020-01-10 NOTE — PROGRESS NOTE ADULT - SUBJECTIVE AND OBJECTIVE BOX
Patient's  VS stable   Has wound  VAC in place  palpable  right  popliteal pulse  felt   DP  triphasic  signals  Has  lot  of  edema  right  foot   Cultures  pending  We performed  a  debridement  of  the  external iliac  artery and   Bovine  pericardial  patch repair   of  the  transplant  anastomotic  site  Trabsplant  team  repaired  the   Bladder  defect  Prognosis  guarded  mainly  because of  sepsis locally There  was no  pus  in the  field  but  large  RP hematoma   was evacuated  most likely infected Can resume  AC  tomorrow  slowly with  unfractionated heparin  ptt in the  range of  60

## 2020-01-10 NOTE — PROGRESS NOTE ADULT - SUBJECTIVE AND OBJECTIVE BOX
VASCULAR SURGERY DAILY PROGRESS NOTE:       SUBJECTIVE/ROS: Patient seen and examined at bedside.  Intubated but follows simple commands.    24 HOUR EVENTS  Patient went to OR after IR for explantation of transplanted kidney and repair of bladder perforation.   Vascular surgery consulted intra-op and repaired the right external iliac artery was repaired with a bovine pericardial patch, repaired the right external iliac vein, performed a thrombectomy of the right lower extremity veins.         MEDICATIONS  (STANDING):  brimonidine 0.2% Ophthalmic Solution 1 Drop(s) Both EYES three times a day  chlorhexidine 0.12% Liquid 15 milliLiter(s) Oral Mucosa every 12 hours  chlorhexidine 2% Cloths 1 Application(s) Topical <User Schedule>  ciprofloxacin   IVPB 400 milliGRAM(s) IV Intermittent every 24 hours  fluconAZOLE IVPB 400 milliGRAM(s) IV Intermittent every 24 hours  latanoprost 0.005% Ophthalmic Solution 1 Drop(s) Both EYES daily  meropenem  IVPB 500 milliGRAM(s) IV Intermittent every 24 hours  pantoprazole  Injectable 40 milliGRAM(s) IV Push daily  polyethylene glycol 3350 17 Gram(s) Oral daily  propofol Infusion 50 MICROgram(s)/kG/Min (19.05 mL/Hr) IV Continuous <Continuous>    MEDICATIONS  (PRN):  HYDROmorphone  Injectable 0.5 milliGRAM(s) IV Push every 2 hours PRN Moderate Pain (4 - 6)  lidocaine 2% Jelly 1 milliLiter(s) IntraUrethral two times a day PRN irritation      OBJECTIVE:    Vital Signs Last 24 Hrs  T(C): 36.8 (10 [place] 2020 07:00), Max: 36.9 (2020 15:00)  T(F): 98.2 (10 [place] 2020 07:00), Max: 98.4 (2020 15:00)  HR: 66 (10 [place] 2020 08:00) (64 - 101)  BP: 111/56 (10 [place] 2020 07:00) (111/56 - 222/94)  BP(mean): 77 (10 [place] 2020 07:00) (77 - 135)  RR: 12 (10 [place] 2020 08:00) (11 - 29)  SpO2: 100% (10 [place] 2020 08:00) (90% - 100%)        I&O's Detail    2020 07:01  -  10 [place] 2020 07:00  --------------------------------------------------------  IN:    heparin Infusion: 120 mL    Packed Red Blood Cells: 700 mL    Plasma: 300 mL    Platelets - Single Donor: 225 mL    propofol Infusion: 34.3 mL    sodium chloride 2%: 500 mL    Solution: 350 mL  Total IN: 2229.3 mL    OUT:    Continuous Bladder Irrigation: 3000 mL    Indwelling Catheter - Urethral: 590 mL    Other: 1000 mL  Total OUT: 4590 mL    Total NET: -2360.7 mL      10 [place] 2020 07:01  -  10 [place] 2020 08:46  --------------------------------------------------------  IN:    propofol Infusion: 15.2 mL    Solution: 200 mL  Total IN: 215.2 mL    OUT:  Total OUT: 0 mL    Total NET: 215.2 mL          Daily Height in cm: 180.34 (2020 20:33)    Daily Weight in k.7 (10 [place] 2020 04:00)    LABS:                        8.2    19.23 )-----------( 246      ( 10 [place] 2020 08:38 )             23.0     01-10    135  |  105  |  22  ----------------------------<  94  4.2   |  18<L>  |  4.21<H>    Ca    7.2<L>      10 [place] 2020 04:40  Phos  5.3     -10  Mg     1.9     -10    TPro  3.5<L>  /  Alb  1.4<L>  /  TBili  2.0<H>  /  DBili  1.4<H>  /  AST  7<L>  /  ALT  5<L>  /  AlkPhos  88  01-10    PT/INR - ( 10 [place] 2020 04:40 )   PT: 14.0 sec;   INR: 1.22 ratio         PTT - ( 10 [place] 2020 04:40 )  PTT:30.3 sec              PHYSICAL EXAM:  Constitutional: Follows simple commands  Respiratory Intubated  Extremities: Dressing c/d/i  LLE with palpable DP pulse, WWP  RLE: 2+ edema, with dopplerable DP signal, WWP

## 2020-01-10 NOTE — BRIEF OPERATIVE NOTE - COMMENTS
DP/PT signals after completion of iliac artery patch repair
On pressors Neosynephrine 0.5 micrograms  Op Note dictation #:[id number]

## 2020-01-10 NOTE — PROGRESS NOTE ADULT - SUBJECTIVE AND OBJECTIVE BOX
[facility] - Melissa Kelsey, Enrique, Maximino, Marin, Hamzah Sepidehfidel  Office Number:  [phone]    Patient resting comfortably in bed in NAD.  Now intubated and sedated.  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   This morning he is s/p nephrectomy and washout of retroperitoneum/hematoma evaluation, repair of bladder perforation.  also s/p repair of right external iliac artery/vein and thrombectomy      ROS: negative unless otherwise mentioned.    Telemetry:  sr, rare pat and pvcs    MEDICATIONS  (STANDING):  brimonidine 0.2% Ophthalmic Solution 1 Drop(s) Both EYES three times a day  chlorhexidine 0.12% Liquid 15 milliLiter(s) Oral Mucosa every 12 hours  chlorhexidine 2% Cloths 1 Application(s) Topical <User Schedule>  ciprofloxacin   IVPB 400 milliGRAM(s) IV Intermittent every 24 hours  dextrose 5% + sodium chloride 0.9%. 1000 milliLiter(s) (30 mL/Hr) IV Continuous <Continuous>  fluconAZOLE IVPB 400 milliGRAM(s) IV Intermittent every 24 hours  latanoprost 0.005% Ophthalmic Solution 1 Drop(s) Both EYES daily  meropenem  IVPB 500 milliGRAM(s) IV Intermittent every 24 hours  methylPREDNISolone sodium succinate Injectable 4 milliGRAM(s) IV Push daily  pantoprazole  Injectable 40 milliGRAM(s) IV Push daily  polyethylene glycol 3350 17 Gram(s) Oral daily  propofol Infusion 50 MICROgram(s)/kG/Min (19.05 mL/Hr) IV Continuous <Continuous>    MEDICATIONS  (PRN):  HYDROmorphone  Injectable 0.5 milliGRAM(s) IV Push every 2 hours PRN Moderate Pain (4 - 6)  lidocaine 2% Jelly 1 milliLiter(s) IntraUrethral two times a day PRN irritation      Allergies    coconut (Anaphylaxis)  morphine (Pruritus; Rash)    Intolerances        Vital Signs Last 24 Hrs  T(C): 36.8 (10 [place] 2020 07:00), Max: 36.9 (09 Jan 2020 15:00)  T(F): 98.2 (10 [place] 2020 07:00), Max: 98.4 (09 Jan 2020 15:00)  HR: 69 (10 [place] 2020 10:00) (64 - 101)  BP: 111/56 (10 [place] 2020 07:00) (111/56 - 222/94)  BP(mean): 77 (10 [place] 2020 07:00) (77 - 135)  RR: 12 (10 [place] 2020 10:00) (11 - 29)  SpO2: 100% (10 [place] 2020 10:00) (90% - 100%)    I&O's Summary    09 Jan 2020 07:01  -  10 [place] 2020 07:00  --------------------------------------------------------  IN: 2229.3 mL / OUT: 4590 mL / NET: -2360.7 mL    10 [place] 2020 07:01  -  10 [place] 2020 10:25  --------------------------------------------------------  IN: 471.8 mL / OUT: 0 mL / NET: 471.8 mL        ON EXAM:    General: NAD, intubated/sedated  HEENT: Mucous membranes are moist, anicteric  Lungs: Non-labored, breath sounds are clear bilaterally, No wheezing, rales or rhonchi  Cardiovascular: Regular, S1 and S2, no murmurs, rubs, or gallops  Gastrointestinal: Bowel Sounds decreased, soft, nontender.   Lymph: mild peripheral edema. No lymphadenopathy.  Skin: No rashes or ulcers  Psych:  unable to assess, intubated/sedated    LABS: All Labs Reviewed:                        8.2    19.23 )-----------( 246      ( 10 [place] 2020 08:38 )             23.0                         10.1   22.10 )-----------( 178      ( 10 [place] 2020 04:40 )             28.9                         8.2    24.88 )-----------( 489      ( 09 Jan 2020 16:46 )             23.5     10 [place] 2020 08:38    138    |  106    |  24     ----------------------------<  87     4.2     |  21     |  4.52   10 [place] 2020 04:40    135    |  105    |  22     ----------------------------<  94     4.2     |  18     |  4.21   09 Jan 2020 14:15    129    |  91     |  40     ----------------------------<  75     4.5     |  22     |  6.71     Ca    7.6        10 [place] 2020 08:38  Ca    7.2        10 Jan 2020 04:40  Ca    8.6        09 Jan 2020 14:15  Phos  5.1       10 [place] 2020 08:38  Phos  5.3       10 [place] 2020 04:40  Phos  6.3       09 Jan 2020 14:15  Mg     2.7       10 [place] 2020 08:38  Mg     1.9       10 [place] 2020 04:40  Mg     2.5       09 Jan 2020 14:15    TPro  3.5    /  Alb  1.4    /  TBili  2.0    /  DBili  1.4    /  AST  7      /  ALT  5      /  AlkPhos  88     10 [place] 2020 04:40  TPro  6.1    /  Alb  2.5    /  TBili  0.8    /  DBili  0.4    /  AST  16     /  ALT  <5     /  AlkPhos  183    09 Jan 2020 03:52  TPro  5.9    /  Alb  2.4    /  TBili  1.0    /  DBili  0.4    /  AST  21     /  ALT  6      /  AlkPhos  184    08 Jan 2020 00:29    PT/INR - ( 10 [place] 2020 04:40 )   PT: 14.0 sec;   INR: 1.22 ratio         PTT - ( 10 [place] 2020 04:40 )  PTT:30.3 sec  CARDIAC MARKERS ( 10 [place] 2020 04:40 )  x     / x     / 58 U/L / x     / x          Blood Culture: Organism --  Gram Stain Blood -- Gram Stain --  Specimen Source .Blood Blood-Venous  Culture-Blood --    Organism --  Gram Stain Blood -- Gram Stain --  Specimen Source .Urine Catheterized  Culture-Blood --    Organism --  Gram Stain Blood -- Gram Stain   No polymorphonuclear leukocytes seen  No organisms seen  by cytocentrifuge  Specimen Source Abdominal Fl Abdominal Fluid  Culture-Blood --    Organism Enterobacter cloacae  Gram Stain Blood -- Gram Stain --  Specimen Source .Urine Clean Catch (Midstream)  Culture-Blood --

## 2020-01-10 NOTE — PROGRESS NOTE ADULT - SUBJECTIVE AND OBJECTIVE BOX
HISTORY  49y Male PMHx of ESRD on HD  (via LUE AVF), HTN, Gout, Glaucoma, PNET s/p robotic distal panc/splenectomy (at [place] 2015 by Dr. Young, followed by Dr. Raphael, [facility] Oncologist), RA with hand contractures. 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 Post op course c/b DGF requiring HD, thrombocytopenia, elevated LDH and Haptoglobulin. Schistocytes  on peripheral smear concerning for TMA. Envarsus held. Received Belatacept 12/13. Started on PLEX (12/12, 12/15). Underwent renal bx on 12/13 c/w profound ATN.  Found to have much improvement to levels, likely related to Tacrolimus. He was discharged home on 12/20/19 w/ outpatient HD.     He was now sent to ED by dialysis center after he was found to be febrile to 102, he did not get HD this AM, and last full HD session was on 12/26. Upon arrival, he was febrile to 101.7, other vitals were stable. He states that he had a cough that began 5-6 days ago that has now mostly resolved. He reports no other febrile episodes other than this morning. He admits to having one SOB episode earlier this week that resolved after he got HD. He also states that his mother was hospitalized earlier this week and was flu+. Currently makes less than 1 cup urine/daily. He currently denies SOB, CP, dyspnea, HA, dizziness, N/V, diarrhea, constipation.     1/1/2020 was taken to OR and found to have a perinephric hematoma. Underwent washout, evacuation of hematoma, and biopsy of transplanted kidney. He was recovering on the floor when overnight he spiked a fever to 38.1 and had 18 beats of wide complex tachycardia. He was also found to have worsening leukocytosis and became acutely hypotensive. A CT scan was obtained demonstrating a 4.5x3.7 perinephric collection. IR was consulted for drainage and SICU was consulted for hemodynamic monitoring.      24 HOUR EVENTS:  - pt with hallucinations. Head CT - negative.  started cipro. Pt developed juan blood output in Kennedy bag. heparin drip held. 2u PRBC given. clot aspirated from Kennedy. pt went to IR for angiogram/IVC filter  - Patient went to OR after IR for explantation of transplanted kidney.         SUBJECTIVE/ROS:  [ ] A ten-point review of systems was otherwise negative except as noted.  [ ] Due to altered mental status/intubation, subjective information were not able to be obtained from the patient. History was obtained, to the extent possible, from review of the chart and collateral sources of information.      NEURO  Exam: awake, alert, oriented  Meds: acetaminophen   Tablet .. 650 milliGRAM(s) Oral every 6 hours PRN Mild Pain (1 - 3)    [x] Adequacy of sedation and pain control has been assessed and adjusted      RESPIRATORY  RR: 20 (01-09-20 @ 20:33) (11 - 29)  SpO2: 100% (01-09-20 @ 20:33) (90% - 100%)  Exam: unlabored, clear to auscultation bilaterally  Mechanical Ventilation: none  ABG - ( 10 [place] 2020 01:56 )  pH: 7.41  /  pCO2: 36    /  pO2: 315   / HCO3: 22    / Base Excess: -1.3  /  SaO2: 100     Lactate: x      [N/A] Extubation Readiness Assessed  Meds: none      CARDIOVASCULAR  HR: 69 (01-09-20 @ 20:33) (62 - 101)  BP: 202/82 (01-09-20 @ 20:33) (115/57 - 222/94)  BP(mean): 118 (01-09-20 @ 20:33) (80 - 135)  Exam: regular rate and rhythm  Cardiac Rhythm: sinus  Perfusion     [x]Adequate   [ ]Inadequate  Mentation   [x]Normal       [ ]Reduced  Extremities  [x]Warm         [ ]Cool  Volume Status [ ]Hypervolemic [x]Euvolemic [ ]Hypovolemic  Meds: none      GI/NUTRITION  Exam: soft, nontender, nondistended  Diet: NPO   Meds: famotidine    Tablet 20 milliGRAM(s) Oral daily  polyethylene glycol 3350 17 Gram(s) Oral daily  senna 2 Tablet(s) Oral at bedtime      GENITOURINARY  I&O's Detail    01-08 @ 07:01  -  01-09 @ 07:00  --------------------------------------------------------  IN:    Continuous Bladder Irrigation: 85663 mL    heparin Infusion: 178 mL    Oral Fluid: 500 mL    Solution: 150 mL  Total IN: 01354 mL    OUT:    Continuous Bladder Irrigation: 4000 mL    Indwelling Catheter - Urethral: 30 mL  Total OUT: 4030 mL    Total NET: 04922 mL      01-09 @ 07:01  -  01-10 @ 02:26  --------------------------------------------------------  IN:    heparin Infusion: 120 mL    Packed Red Blood Cells: 700 mL    sodium chloride 2% .: 500 mL    Solution: 300 mL  Total IN: 1620 mL    OUT:    Continuous Bladder Irrigation: 3000 mL    Indwelling Catheter - Urethral: 590 mL    Other: 1000 mL  Total OUT: 4590 mL    Total NET: -2970 mL        Weight (kg): 63.5 (01-09 @ 20:33)  01-09    129<L>  |  91<L>  |  40<H>  ----------------------------<  75  4.5   |  22  |  6.71<H>    Ca    8.6      09 Jan 2020 14:15  Phos  6.3     01-09  Mg     2.5     01-09    TPro  6.1  /  Alb  2.5<L>  /  TBili  0.8  /  DBili  0.4<H>  /  AST  16  /  ALT  <5<L>  /  AlkPhos  183<H>  01-09    [x] Kennedy catheter, indication: urine output monitoring in critically ill   Meds: oxybutynin 10 milliGRAM(s) Oral every 8 hours  sodium chloride 2% . 1000 milliLiter(s) IV Continuous <Continuous>        HEMATOLOGIC  Meds: none  [x] VTE Prophylaxis                        8.2    24.88 )-----------( 489      ( 09 Jan 2020 16:46 )             23.5     PT/INR - ( 09 Jan 2020 03:52 )   PT: 12.4 sec;   INR: 1.08 ratio         PTT - ( 09 Jan 2020 16:13 )  PTT:39.5 sec  Transfusion     [ ] PRBC   [ ] Platelets   [ ] FFP   [ ] Cryoprecipitate      INFECTIOUS DISEASES  WBC Count: 24.88 K/uL (01-09 @ 16:46)  WBC Count: 27.31 K/uL (01-09 @ 14:14)  WBC Count: 31.53 K/uL (01-09 @ 03:52)    RECENT CULTURES:  Specimen Source: .Blood Blood-Venous  Date/Time: 01-07 @ 13:23  Culture Results:   No growth to date.  Gram Stain: --  Organism: --  Specimen Source: .Urine Catheterized  Date/Time: 01-07 @ 00:18  Culture Results:   <10,000 CFU/mL Normal Urogenital Gillian  Gram Stain: --  Organism: --  Specimen Source: Abdominal Fl Abdominal Fluid  Date/Time: 01-06 @ 21:03  Culture Results:   No growth  Gram Stain:   No polymorphonuclear leukocytes seen  No organisms seen  by cytocentrifuge  Organism: --  Specimen Source: .Urine Clean Catch (Midstream)  Date/Time: 01-05 @ 13:59  Culture Results:   >100,000 CFU/ml Enterobacter cloacae  Gram Stain: --  Organism: Enterobacter cloacae    Meds: ciprofloxacin   IVPB 400 milliGRAM(s) IV Intermittent every 24 hours  fluconAZOLE   Tablet 400 milliGRAM(s) Oral daily  meropenem  IVPB 500 milliGRAM(s) IV Intermittent every 24 hours  trimethoprim   80 mG/sulfamethoxazole 400 mG 1 Tablet(s) Oral daily  valGANciclovir 450 milliGRAM(s) Oral <User Schedule>        ENDOCRINE  CAPILLARY BLOOD GLUCOSE        Meds: allopurinol 100 milliGRAM(s) Oral daily  predniSONE   Tablet 5 milliGRAM(s) Oral daily        ACCESS DEVICES:  [x] Peripheral IV  [ ] Central Venous Line	[ ] R	[ ] L	[ ] IJ	[ ] Fem	[ ] SC	Placed:   [ ] Arterial Line		[ ] R	[ ] L	[ ] Fem	[ ] Rad	[ ] Ax	Placed:   [ ] PICC:					[ ] Mediport  [ ] Urinary Catheter, Date Placed:   [x] Necessity of urinary, arterial, and venous catheters discussed    OTHER MEDICATIONS:  brimonidine 0.2% Ophthalmic Solution 1 Drop(s) Both EYES three times a day  chlorhexidine 2% Cloths 1 Application(s) Topical <User Schedule>  latanoprost 0.005% Ophthalmic Solution 1 Drop(s) Both EYES daily  lidocaine 2% Jelly 1 milliLiter(s) IntraUrethral two times a day PRN      CODE STATUS: full code       IMAGING: < from: CT Head No Cont (01.09.20 @ 14:42) >  FINDINGS:  Prominence of the ventricles and sulci consistent with age-related cerebral volume loss. There is no intraparenchymal hematoma, mass effect or midline shift. No abnormal extra-axial fluid collections are present.     The calvarium is intact. The visualized intraorbital compartments, paranasal sinuses appear free of acute disease. Under pneumatization of the bilateral mastoid tips. There is partial opacification of the right mastoid air cells. The left mastoid air cell are grossly clear.    IMPRESSION:  No acute intracranial hemorrhage, mass effect, or other acute intracranial pathology.            < end of copied text > HISTORY  49y Male PMHx of ESRD on HD  (via LUE AVF), HTN, Gout, Glaucoma, PNET s/p robotic distal panc/splenectomy (at [place] 2015 by Dr. Young, followed by Dr. Raphael, [facility] Oncologist), RA with hand contractures. 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 Post op course c/b DGF requiring HD, thrombocytopenia, elevated LDH and Haptoglobulin. Schistocytes  on peripheral smear concerning for TMA. Envarsus held. Received Belatacept 12/13. Started on PLEX (12/12, 12/15). Underwent renal bx on 12/13 c/w profound ATN.  Found to have much improvement to levels, likely related to Tacrolimus. He was discharged home on 12/20/19 w/ outpatient HD.     He was now sent to ED by dialysis center after he was found to be febrile to 102, he did not get HD this AM, and last full HD session was on 12/26. Upon arrival, he was febrile to 101.7, other vitals were stable. He states that he had a cough that began 5-6 days ago that has now mostly resolved. He reports no other febrile episodes other than this morning. He admits to having one SOB episode earlier this week that resolved after he got HD. He also states that his mother was hospitalized earlier this week and was flu+. Currently makes less than 1 cup urine/daily. He currently denies SOB, CP, dyspnea, HA, dizziness, N/V, diarrhea, constipation.     1/1/2020 was taken to OR and found to have a perinephric hematoma. Underwent washout, evacuation of hematoma, and biopsy of transplanted kidney. He was recovering on the floor when overnight he spiked a fever to 38.1 and had 18 beats of wide complex tachycardia. He was also found to have worsening leukocytosis and became acutely hypotensive. A CT scan was obtained demonstrating a 4.5x3.7 perinephric collection. IR was consulted for drainage and SICU was consulted for hemodynamic monitoring.      24 HOUR EVENTS:  - pt with hallucinations. Head CT - negative.  started cipro. Pt developed juan blood output in Kennedy bag. heparin drip held. 2u PRBC given. clot aspirated from Kennedy. pt went to IR for angiogram/IVC filter  - Patient went to OR after IR for explantation of transplanted kidney and repair of bladder perforation.         SUBJECTIVE/ROS:  [ ] A ten-point review of systems was otherwise negative except as noted.  [ ] Due to altered mental status/intubation, subjective information were not able to be obtained from the patient. History was obtained, to the extent possible, from review of the chart and collateral sources of information.      NEURO  Exam: awake, alert, oriented  Meds: acetaminophen   Tablet .. 650 milliGRAM(s) Oral every 6 hours PRN Mild Pain (1 - 3)    [x] Adequacy of sedation and pain control has been assessed and adjusted      RESPIRATORY  RR: 20 (01-09-20 @ 20:33) (11 - 29)  SpO2: 100% (01-09-20 @ 20:33) (90% - 100%)  Exam: unlabored, clear to auscultation bilaterally  Mechanical Ventilation: none  ABG - ( 10 [place] 2020 01:56 )  pH: 7.41  /  pCO2: 36    /  pO2: 315   / HCO3: 22    / Base Excess: -1.3  /  SaO2: 100     Lactate: x      [N/A] Extubation Readiness Assessed  Meds: none      CARDIOVASCULAR  HR: 69 (01-09-20 @ 20:33) (62 - 101)  BP: 202/82 (01-09-20 @ 20:33) (115/57 - 222/94)  BP(mean): 118 (01-09-20 @ 20:33) (80 - 135)  Exam: regular rate and rhythm  Cardiac Rhythm: sinus  Perfusion     [x]Adequate   [ ]Inadequate  Mentation   [x]Normal       [ ]Reduced  Extremities  [x]Warm         [ ]Cool  Volume Status [ ]Hypervolemic [x]Euvolemic [ ]Hypovolemic  Meds: none      GI/NUTRITION  Exam: soft, nontender, nondistended  Diet: NPO   Meds: famotidine    Tablet 20 milliGRAM(s) Oral daily  polyethylene glycol 3350 17 Gram(s) Oral daily  senna 2 Tablet(s) Oral at bedtime      GENITOURINARY  I&O's Detail    01-08 @ 07:01  -  01-09 @ 07:00  --------------------------------------------------------  IN:    Continuous Bladder Irrigation: 14774 mL    heparin Infusion: 178 mL    Oral Fluid: 500 mL    Solution: 150 mL  Total IN: 60457 mL    OUT:    Continuous Bladder Irrigation: 4000 mL    Indwelling Catheter - Urethral: 30 mL  Total OUT: 4030 mL    Total NET: 93315 mL      01-09 @ 07:01  -  01-10 @ 02:26  --------------------------------------------------------  IN:    heparin Infusion: 120 mL    Packed Red Blood Cells: 700 mL    sodium chloride 2% .: 500 mL    Solution: 300 mL  Total IN: 1620 mL    OUT:    Continuous Bladder Irrigation: 3000 mL    Indwelling Catheter - Urethral: 590 mL    Other: 1000 mL  Total OUT: 4590 mL    Total NET: -2970 mL        Weight (kg): 63.5 (01-09 @ 20:33)  01-09    129<L>  |  91<L>  |  40<H>  ----------------------------<  75  4.5   |  22  |  6.71<H>    Ca    8.6      09 Jan 2020 14:15  Phos  6.3     01-09  Mg     2.5     01-09    TPro  6.1  /  Alb  2.5<L>  /  TBili  0.8  /  DBili  0.4<H>  /  AST  16  /  ALT  <5<L>  /  AlkPhos  183<H>  01-09    [x] Kennedy catheter, indication: urine output monitoring in critically ill   Meds: oxybutynin 10 milliGRAM(s) Oral every 8 hours  sodium chloride 2% . 1000 milliLiter(s) IV Continuous <Continuous>        HEMATOLOGIC  Meds: none  [x] VTE Prophylaxis                        8.2    24.88 )-----------( 489      ( 09 Jan 2020 16:46 )             23.5     PT/INR - ( 09 Jan 2020 03:52 )   PT: 12.4 sec;   INR: 1.08 ratio         PTT - ( 09 Jan 2020 16:13 )  PTT:39.5 sec  Transfusion     [ ] PRBC   [ ] Platelets   [ ] FFP   [ ] Cryoprecipitate      INFECTIOUS DISEASES  WBC Count: 24.88 K/uL (01-09 @ 16:46)  WBC Count: 27.31 K/uL (01-09 @ 14:14)  WBC Count: 31.53 K/uL (01-09 @ 03:52)    RECENT CULTURES:  Specimen Source: .Blood Blood-Venous  Date/Time: 01-07 @ 13:23  Culture Results:   No growth to date.  Gram Stain: --  Organism: --  Specimen Source: .Urine Catheterized  Date/Time: 01-07 @ 00:18  Culture Results:   <10,000 CFU/mL Normal Urogenital Gillian  Gram Stain: --  Organism: --  Specimen Source: Abdominal Fl Abdominal Fluid  Date/Time: 01-06 @ 21:03  Culture Results:   No growth  Gram Stain:   No polymorphonuclear leukocytes seen  No organisms seen  by cytocentrifuge  Organism: --  Specimen Source: .Urine Clean Catch (Midstream)  Date/Time: 01-05 @ 13:59  Culture Results:   >100,000 CFU/ml Enterobacter cloacae  Gram Stain: --  Organism: Enterobacter cloacae    Meds: ciprofloxacin   IVPB 400 milliGRAM(s) IV Intermittent every 24 hours  fluconAZOLE   Tablet 400 milliGRAM(s) Oral daily  meropenem  IVPB 500 milliGRAM(s) IV Intermittent every 24 hours  trimethoprim   80 mG/sulfamethoxazole 400 mG 1 Tablet(s) Oral daily  valGANciclovir 450 milliGRAM(s) Oral <User Schedule>        ENDOCRINE  CAPILLARY BLOOD GLUCOSE        Meds: allopurinol 100 milliGRAM(s) Oral daily  predniSONE   Tablet 5 milliGRAM(s) Oral daily        ACCESS DEVICES:  [x] Peripheral IV  [ ] Central Venous Line	[ ] R	[ ] L	[ ] IJ	[ ] Fem	[ ] SC	Placed:   [ ] Arterial Line		[ ] R	[ ] L	[ ] Fem	[ ] Rad	[ ] Ax	Placed:   [ ] PICC:					[ ] Mediport  [ ] Urinary Catheter, Date Placed:   [x] Necessity of urinary, arterial, and venous catheters discussed    OTHER MEDICATIONS:  brimonidine 0.2% Ophthalmic Solution 1 Drop(s) Both EYES three times a day  chlorhexidine 2% Cloths 1 Application(s) Topical <User Schedule>  latanoprost 0.005% Ophthalmic Solution 1 Drop(s) Both EYES daily  lidocaine 2% Jelly 1 milliLiter(s) IntraUrethral two times a day PRN      CODE STATUS: full code       IMAGING: < from: CT Head No Cont (01.09.20 @ 14:42) >  FINDINGS:  Prominence of the ventricles and sulci consistent with age-related cerebral volume loss. There is no intraparenchymal hematoma, mass effect or midline shift. No abnormal extra-axial fluid collections are present.     The calvarium is intact. The visualized intraorbital compartments, paranasal sinuses appear free of acute disease. Under pneumatization of the bilateral mastoid tips. There is partial opacification of the right mastoid air cells. The left mastoid air cell are grossly clear.    IMPRESSION:  No acute intracranial hemorrhage, mass effect, or other acute intracranial pathology.            < end of copied text > HISTORY  49y Male PMHx of ESRD on HD  (via LUE AVF), HTN, Gout, Glaucoma, PNET s/p robotic distal panc/splenectomy (at [place] 2015 by Dr. Young, followed by Dr. Raphael, [facility] Oncologist), RA with hand contractures. 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 Post op course c/b DGF requiring HD, thrombocytopenia, elevated LDH and Haptoglobulin. Schistocytes  on peripheral smear concerning for TMA. Envarsus held. Received Belatacept 12/13. Started on PLEX (12/12, 12/15). Underwent renal bx on 12/13 c/w profound ATN.  Found to have much improvement to levels, likely related to Tacrolimus. He was discharged home on 12/20/19 w/ outpatient HD.     He was now sent to ED by dialysis center after he was found to be febrile to 102, he did not get HD this AM, and last full HD session was on 12/26. Upon arrival, he was febrile to 101.7, other vitals were stable. He states that he had a cough that began 5-6 days ago that has now mostly resolved. He reports no other febrile episodes other than this morning. He admits to having one SOB episode earlier this week that resolved after he got HD. He also states that his mother was hospitalized earlier this week and was flu+. Currently makes less than 1 cup urine/daily. He currently denies SOB, CP, dyspnea, HA, dizziness, N/V, diarrhea, constipation.     1/1/2020 was taken to OR and found to have a perinephric hematoma. Underwent washout, evacuation of hematoma, and biopsy of transplanted kidney. He was recovering on the floor when overnight he spiked a fever to 38.1 and had 18 beats of wide complex tachycardia. He was also found to have worsening leukocytosis and became acutely hypotensive. A CT scan was obtained demonstrating a 4.5x3.7 perinephric collection. IR was consulted for drainage and SICU was consulted for hemodynamic monitoring.      24 HOUR EVENTS:  - Pt with hallucinations. Head CT - negative.  Started ciprofloxacin.   - Pt with bladder spasms, and a large clot was aspirated from Kennedy. After this, pt developed juan blood output in Kennedy bag. Heparin drip held. 2u PRBC given. 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 A covered stent was placed and IVC filter.  - Patient went to OR after IR where the ureter-to-bladder anastomosis was found to be completely disrupted. The pt had explantation of transplanted kidney, repair of right external iliac artery with bovine pericardial patch, thrombectomy of right lower extremity veins, removal of right external iliac artery stent and repair of bladder perforation.   - Pt was transferred back to SICU intubated, on a small dose of phenylephrine that was quickly turned off.    SUBJECTIVE/ROS:  [ ] A ten-point review of systems was otherwise negative except as noted.  [ x] Due to altered mental status/intubation, subjective information were not able to be obtained from the patient. History was obtained, to the extent possible, from review of the chart and collateral sources of information.      NEURO  Exam: sedated, arousable, follows commands  Meds: acetaminophen   Tablet .. 650 milliGRAM(s) Oral every 6 hours PRN Mild Pain (1 - 3)    [x] Adequacy of sedation and pain control has been assessed and adjusted      RESPIRATORY  RR: 20 (01-09-20 @ 20:33) (11 - 29)  SpO2: 100% (01-09-20 @ 20:33) (90% - 100%)  Exam: unlabored, clear to auscultation bilaterally  Mechanical Ventilation: PRVC  ABG - ( 10 [place] 2020 01:56 )  pH: 7.41  /  pCO2: 36    /  pO2: 315   / HCO3: 22    / Base Excess: -1.3  /  SaO2: 100     Lactate: x      [x] Extubation Readiness Assessed  Meds: none      CARDIOVASCULAR  HR: 69 (01-09-20 @ 20:33) (62 - 101)  BP: 202/82 (01-09-20 @ 20:33) (115/57 - 222/94)  BP(mean): 118 (01-09-20 @ 20:33) (80 - 135)  Exam: regular rate and rhythm  Cardiac Rhythm: sinus  Perfusion     [x]Adequate   [ ]Inadequate  Mentation   [x]Normal       [ ]Reduced  Extremities  [x]Warm         [ ]Cool  Volume Status [ ]Hypervolemic [x]Euvolemic [ ]Hypovolemic  Meds: none      GI/NUTRITION  Exam: softly distended, nontender, RLQ vac in place  Diet: NPO   Meds: famotidine    Tablet 20 milliGRAM(s) Oral daily  polyethylene glycol 3350 17 Gram(s) Oral daily  senna 2 Tablet(s) Oral at bedtime      GENITOURINARY  I&O's Detail    01-08 @ 07:01  -  01-09 @ 07:00  --------------------------------------------------------  IN:    Continuous Bladder Irrigation: 18175 mL    heparin Infusion: 178 mL    Oral Fluid: 500 mL    Solution: 150 mL  Total IN: 95037 mL    OUT:    Continuous Bladder Irrigation: 4000 mL    Indwelling Catheter - Urethral: 30 mL  Total OUT: 4030 mL    Total NET: 91397 mL      01-09 @ 07:01  -  01-10 @ 02:26  --------------------------------------------------------  IN:    heparin Infusion: 120 mL    Packed Red Blood Cells: 700 mL    sodium chloride 2% .: 500 mL    Solution: 300 mL  Total IN: 1620 mL    OUT:    Continuous Bladder Irrigation: 3000 mL    Indwelling Catheter - Urethral: 590 mL    Other: 1000 mL  Total OUT: 4590 mL    Total NET: -2970 mL        Weight (kg): 63.5 (01-09 @ 20:33)  01-09    129<L>  |  91<L>  |  40<H>  ----------------------------<  75  4.5   |  22  |  6.71<H>    Ca    8.6      09 Jan 2020 14:15  Phos  6.3     01-09  Mg     2.5     01-09    TPro  6.1  /  Alb  2.5<L>  /  TBili  0.8  /  DBili  0.4<H>  /  AST  16  /  ALT  <5<L>  /  AlkPhos  183<H>  01-09    [x] Kennedy catheter, indication: urine output monitoring in critically ill   Meds: oxybutynin 10 milliGRAM(s) Oral every 8 hours  sodium chloride 2% . 1000 milliLiter(s) IV Continuous <Continuous>        HEMATOLOGIC  Meds: none  [x] VTE Prophylaxis                        8.2    24.88 )-----------( 489      ( 09 Jan 2020 16:46 )             23.5     PT/INR - ( 09 Jan 2020 03:52 )   PT: 12.4 sec;   INR: 1.08 ratio         PTT - ( 09 Jan 2020 16:13 )  PTT:39.5 sec  Transfusion     [7 ] PRBC   [1 ] Platelets   [1 ] FFP   [ ] Cryoprecipitate      INFECTIOUS DISEASES  WBC Count: 24.88 K/uL (01-09 @ 16:46)  WBC Count: 27.31 K/uL (01-09 @ 14:14)  WBC Count: 31.53 K/uL (01-09 @ 03:52)    RECENT CULTURES:  Specimen Source: .Blood Blood-Venous  Date/Time: 01-07 @ 13:23  Culture Results:   No growth to date.  Gram Stain: --  Organism: --  Specimen Source: .Urine Catheterized  Date/Time: 01-07 @ 00:18  Culture Results:   <10,000 CFU/mL Normal Urogenital Gillian  Gram Stain: --  Organism: --  Specimen Source: Abdominal Fl Abdominal Fluid  Date/Time: 01-06 @ 21:03  Culture Results:   No growth  Gram Stain:   No polymorphonuclear leukocytes seen  No organisms seen  by cytocentrifuge  Organism: --  Specimen Source: .Urine Clean Catch (Midstream)  Date/Time: 01-05 @ 13:59  Culture Results:   >100,000 CFU/ml Enterobacter cloacae  Gram Stain: --  Organism: Enterobacter cloacae    Meds: ciprofloxacin   IVPB 400 milliGRAM(s) IV Intermittent every 24 hours  fluconAZOLE   Tablet 400 milliGRAM(s) Oral daily  meropenem  IVPB 500 milliGRAM(s) IV Intermittent every 24 hours  trimethoprim   80 mG/sulfamethoxazole 400 mG 1 Tablet(s) Oral daily  valGANciclovir 450 milliGRAM(s) Oral <User Schedule>        ENDOCRINE  Meds: allopurinol 100 milliGRAM(s) Oral daily  predniSONE   Tablet 5 milliGRAM(s) Oral daily        ACCESS DEVICES:  [x] Peripheral IV  [ ] Central Venous Line	[ ] R	[ ] L	[ ] IJ	[ ] Fem	[ ] SC	Placed:   [ ] Arterial Line		[ ] R	[ ] L	[ ] Fem	[ ] Rad	[ ] Ax	Placed:   [ ] PICC:					[ ] Mediport  [x ] Urinary Catheter, Date Placed: 1/6  [x] Necessity of urinary, arterial, and venous catheters discussed    OTHER MEDICATIONS:  brimonidine 0.2% Ophthalmic Solution 1 Drop(s) Both EYES three times a day  chlorhexidine 2% Cloths 1 Application(s) Topical <User Schedule>  latanoprost 0.005% Ophthalmic Solution 1 Drop(s) Both EYES daily  lidocaine 2% Jelly 1 milliLiter(s) IntraUrethral two times a day PRN      CODE STATUS: full code       IMAGING: < from: CT Head No Cont (01.09.20 @ 14:42) >  FINDINGS:  Prominence of the ventricles and sulci consistent with age-related cerebral volume loss. There is no intraparenchymal hematoma, mass effect or midline shift. No abnormal extra-axial fluid collections are present.     The calvarium is intact. The visualized intraorbital compartments, paranasal sinuses appear free of acute disease. Under pneumatization of the bilateral mastoid tips. There is partial opacification of the right mastoid air cells. The left mastoid air cell are grossly clear.    IMPRESSION:  No acute intracranial hemorrhage, mass effect, or other acute intracranial pathology.

## 2020-01-10 NOTE — PROGRESS NOTE ADULT - PROBLEM SELECTOR PLAN 1
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 tacrolimus) requiring HD, PLEX 12/12 and plasmapheresis 12/14/19. Switched tacrolimus to belatacept. Pt. had multiple complications, and is now s/p transplant nephrectomy.  - Continue prednisone, stop all other immunosuppression.  - Monitor labs.

## 2020-01-10 NOTE — PROGRESS NOTE ADULT - ATTENDING COMMENTS
Kidney recipient s/p allograft removal  DGF, TMA, ATN, Iliac vein thrombosis, arterial pseudoaneurysm with bleeding  Reviewed events, clinical and lab data, imaging studies and microbiology  ESRD on hemodialysis support  DVT s/p IVC filter placement  s/p PRBC for acute blood loss anemia  On minimized immunosuppression  Plan:  Follow up allograft pathology and blood culture  Continue antimicrobial regimen  Monitor daily for hemodialysis need, primary nephrologist supervising hemodialysis  Will follow  I was present during and reviewed clinical and lab data as well as assessment and plan as documented by the house staff as noted. Please contact if any additional questions with any change in clinical condition or on availability of any additional information or reports.

## 2020-01-10 NOTE — BRIEF OPERATIVE NOTE - NSEVIDNCEINFORABSCESSFT_GEN_ALL_CORE
Turbid perinephritic fluid collection and infected hematoma
Infected retro-peritoneal hematoma as well as urinary bladder clot  Pseudo-aneurysm of anastomosis of renal artery to external iliac vein.  patient had a prior drainage of a carlos-nephric abscess

## 2020-01-11 DIAGNOSIS — I82.409 ACUTE EMBOLISM AND THROMBOSIS OF UNSPECIFIED DEEP VEINS OF UNSPECIFIED LOWER EXTREMITY: ICD-10-CM

## 2020-01-11 DIAGNOSIS — B99.9 UNSPECIFIED INFECTIOUS DISEASE: ICD-10-CM

## 2020-01-11 LAB
ALBUMIN SERPL ELPH-MCNC: 1.7 G/DL — LOW (ref 3.3–5)
ALP SERPL-CCNC: 83 U/L — SIGNIFICANT CHANGE UP (ref 40–120)
ALT FLD-CCNC: 5 U/L — LOW (ref 10–45)
ANION GAP SERPL CALC-SCNC: 11 MMOL/L — SIGNIFICANT CHANGE UP (ref 5–17)
ANION GAP SERPL CALC-SCNC: 13 MMOL/L — SIGNIFICANT CHANGE UP (ref 5–17)
ANION GAP SERPL CALC-SCNC: 15 MMOL/L — SIGNIFICANT CHANGE UP (ref 5–17)
APTT BLD: 28.8 SEC — SIGNIFICANT CHANGE UP (ref 27.5–36.3)
APTT BLD: 29.7 SEC — SIGNIFICANT CHANGE UP (ref 27.5–36.3)
APTT BLD: 46.3 SEC — HIGH (ref 27.5–36.3)
APTT BLD: 55 SEC — HIGH (ref 27.5–36.3)
AST SERPL-CCNC: 8 U/L — LOW (ref 10–40)
BILIRUB DIRECT SERPL-MCNC: 0.6 MG/DL — HIGH (ref 0–0.2)
BILIRUB INDIRECT FLD-MCNC: 0.2 MG/DL — SIGNIFICANT CHANGE UP (ref 0.2–1)
BILIRUB SERPL-MCNC: 0.8 MG/DL — SIGNIFICANT CHANGE UP (ref 0.2–1.2)
BUN SERPL-MCNC: 13 MG/DL — SIGNIFICANT CHANGE UP (ref 7–23)
BUN SERPL-MCNC: 26 MG/DL — HIGH (ref 7–23)
BUN SERPL-MCNC: 29 MG/DL — HIGH (ref 7–23)
CALCIUM SERPL-MCNC: 7.4 MG/DL — LOW (ref 8.4–10.5)
CALCIUM SERPL-MCNC: 8 MG/DL — LOW (ref 8.4–10.5)
CALCIUM SERPL-MCNC: 8 MG/DL — LOW (ref 8.4–10.5)
CHLORIDE SERPL-SCNC: 100 MMOL/L — SIGNIFICANT CHANGE UP (ref 96–108)
CHLORIDE SERPL-SCNC: 102 MMOL/L — SIGNIFICANT CHANGE UP (ref 96–108)
CHLORIDE SERPL-SCNC: 99 MMOL/L — SIGNIFICANT CHANGE UP (ref 96–108)
CO2 SERPL-SCNC: 19 MMOL/L — LOW (ref 22–31)
CO2 SERPL-SCNC: 19 MMOL/L — LOW (ref 22–31)
CO2 SERPL-SCNC: 23 MMOL/L — SIGNIFICANT CHANGE UP (ref 22–31)
CREAT SERPL-MCNC: 3.21 MG/DL — HIGH (ref 0.5–1.3)
CREAT SERPL-MCNC: 5.36 MG/DL — HIGH (ref 0.5–1.3)
CREAT SERPL-MCNC: 5.58 MG/DL — HIGH (ref 0.5–1.3)
CULTURE RESULTS: SIGNIFICANT CHANGE UP
GAS PNL BLDA: SIGNIFICANT CHANGE UP
GLUCOSE SERPL-MCNC: 109 MG/DL — HIGH (ref 70–99)
GLUCOSE SERPL-MCNC: 109 MG/DL — HIGH (ref 70–99)
GLUCOSE SERPL-MCNC: 99 MG/DL — SIGNIFICANT CHANGE UP (ref 70–99)
HBV CORE AB SER-ACNC: SIGNIFICANT CHANGE UP
HBV SURFACE AB SER-ACNC: 351 MIU/ML — SIGNIFICANT CHANGE UP
HBV SURFACE AG SER-ACNC: SIGNIFICANT CHANGE UP
HCT VFR BLD CALC: 22.8 % — LOW (ref 39–50)
HCT VFR BLD CALC: 24.6 % — LOW (ref 39–50)
HCT VFR BLD CALC: 25.4 % — LOW (ref 39–50)
HCT VFR BLD CALC: 25.5 % — LOW (ref 39–50)
HCV AB S/CO SERPL IA: 0.09 S/CO — SIGNIFICANT CHANGE UP (ref 0–0.99)
HCV AB SERPL-IMP: SIGNIFICANT CHANGE UP
HGB BLD-MCNC: 8.1 G/DL — LOW (ref 13–17)
HGB BLD-MCNC: 8.4 G/DL — LOW (ref 13–17)
HGB BLD-MCNC: 8.7 G/DL — LOW (ref 13–17)
HGB BLD-MCNC: 8.8 G/DL — LOW (ref 13–17)
INR BLD: 0.93 RATIO — SIGNIFICANT CHANGE UP (ref 0.88–1.16)
INR BLD: 0.97 RATIO — SIGNIFICANT CHANGE UP (ref 0.88–1.16)
INR BLD: 0.99 RATIO — SIGNIFICANT CHANGE UP (ref 0.88–1.16)
MAGNESIUM SERPL-MCNC: 2.1 MG/DL — SIGNIFICANT CHANGE UP (ref 1.6–2.6)
MAGNESIUM SERPL-MCNC: 2.4 MG/DL — SIGNIFICANT CHANGE UP (ref 1.6–2.6)
MAGNESIUM SERPL-MCNC: 2.5 MG/DL — SIGNIFICANT CHANGE UP (ref 1.6–2.6)
MCHC RBC-ENTMCNC: 29.3 PG — SIGNIFICANT CHANGE UP (ref 27–34)
MCHC RBC-ENTMCNC: 29.6 PG — SIGNIFICANT CHANGE UP (ref 27–34)
MCHC RBC-ENTMCNC: 29.8 PG — SIGNIFICANT CHANGE UP (ref 27–34)
MCHC RBC-ENTMCNC: 30.5 PG — SIGNIFICANT CHANGE UP (ref 27–34)
MCHC RBC-ENTMCNC: 34.1 GM/DL — SIGNIFICANT CHANGE UP (ref 32–36)
MCHC RBC-ENTMCNC: 34.3 GM/DL — SIGNIFICANT CHANGE UP (ref 32–36)
MCHC RBC-ENTMCNC: 34.5 GM/DL — SIGNIFICANT CHANGE UP (ref 32–36)
MCHC RBC-ENTMCNC: 35.5 GM/DL — SIGNIFICANT CHANGE UP (ref 32–36)
MCV RBC AUTO: 85.7 FL — SIGNIFICANT CHANGE UP (ref 80–100)
MCV RBC AUTO: 85.7 FL — SIGNIFICANT CHANGE UP (ref 80–100)
MCV RBC AUTO: 86.4 FL — SIGNIFICANT CHANGE UP (ref 80–100)
MCV RBC AUTO: 86.4 FL — SIGNIFICANT CHANGE UP (ref 80–100)
NRBC # BLD: 0 /100 WBCS — SIGNIFICANT CHANGE UP (ref 0–0)
PHOSPHATE SERPL-MCNC: 4 MG/DL — SIGNIFICANT CHANGE UP (ref 2.5–4.5)
PHOSPHATE SERPL-MCNC: 6.1 MG/DL — HIGH (ref 2.5–4.5)
PHOSPHATE SERPL-MCNC: 6.3 MG/DL — HIGH (ref 2.5–4.5)
PLATELET # BLD AUTO: 258 K/UL — SIGNIFICANT CHANGE UP (ref 150–400)
PLATELET # BLD AUTO: 313 K/UL — SIGNIFICANT CHANGE UP (ref 150–400)
PLATELET # BLD AUTO: 325 K/UL — SIGNIFICANT CHANGE UP (ref 150–400)
PLATELET # BLD AUTO: 331 K/UL — SIGNIFICANT CHANGE UP (ref 150–400)
POTASSIUM SERPL-MCNC: 4.1 MMOL/L — SIGNIFICANT CHANGE UP (ref 3.5–5.3)
POTASSIUM SERPL-MCNC: 4.7 MMOL/L — SIGNIFICANT CHANGE UP (ref 3.5–5.3)
POTASSIUM SERPL-MCNC: 4.9 MMOL/L — SIGNIFICANT CHANGE UP (ref 3.5–5.3)
POTASSIUM SERPL-SCNC: 4.1 MMOL/L — SIGNIFICANT CHANGE UP (ref 3.5–5.3)
POTASSIUM SERPL-SCNC: 4.7 MMOL/L — SIGNIFICANT CHANGE UP (ref 3.5–5.3)
POTASSIUM SERPL-SCNC: 4.9 MMOL/L — SIGNIFICANT CHANGE UP (ref 3.5–5.3)
PROT SERPL-MCNC: 4 G/DL — LOW (ref 6–8.3)
PROTHROM AB SERPL-ACNC: 10.6 SEC — SIGNIFICANT CHANGE UP (ref 10–12.9)
PROTHROM AB SERPL-ACNC: 11.1 SEC — SIGNIFICANT CHANGE UP (ref 10–12.9)
PROTHROM AB SERPL-ACNC: 11.3 SEC — SIGNIFICANT CHANGE UP (ref 10–12.9)
RBC # BLD: 2.66 M/UL — LOW (ref 4.2–5.8)
RBC # BLD: 2.87 M/UL — LOW (ref 4.2–5.8)
RBC # BLD: 2.94 M/UL — LOW (ref 4.2–5.8)
RBC # BLD: 2.95 M/UL — LOW (ref 4.2–5.8)
RBC # FLD: 15.9 % — HIGH (ref 10.3–14.5)
RBC # FLD: 16.2 % — HIGH (ref 10.3–14.5)
RBC # FLD: 16.2 % — HIGH (ref 10.3–14.5)
RBC # FLD: 16.3 % — HIGH (ref 10.3–14.5)
SODIUM SERPL-SCNC: 129 MMOL/L — LOW (ref 135–145)
SODIUM SERPL-SCNC: 134 MMOL/L — LOW (ref 135–145)
SODIUM SERPL-SCNC: 138 MMOL/L — SIGNIFICANT CHANGE UP (ref 135–145)
SPECIMEN SOURCE: SIGNIFICANT CHANGE UP
VANCOMYCIN FLD-MCNC: 25.1 UG/ML
WBC # BLD: 17.47 K/UL — HIGH (ref 3.8–10.5)
WBC # BLD: 18.21 K/UL — HIGH (ref 3.8–10.5)
WBC # BLD: 18.25 K/UL — HIGH (ref 3.8–10.5)
WBC # BLD: 19.14 K/UL — HIGH (ref 3.8–10.5)
WBC # FLD AUTO: 17.47 K/UL — HIGH (ref 3.8–10.5)
WBC # FLD AUTO: 18.21 K/UL — HIGH (ref 3.8–10.5)
WBC # FLD AUTO: 18.25 K/UL — HIGH (ref 3.8–10.5)
WBC # FLD AUTO: 19.14 K/UL — HIGH (ref 3.8–10.5)

## 2020-01-11 PROCEDURE — 99232 SBSQ HOSP IP/OBS MODERATE 35: CPT

## 2020-01-11 PROCEDURE — 99291 CRITICAL CARE FIRST HOUR: CPT

## 2020-01-11 PROCEDURE — 71045 X-RAY EXAM CHEST 1 VIEW: CPT | Mod: 26

## 2020-01-11 PROCEDURE — 99233 SBSQ HOSP IP/OBS HIGH 50: CPT

## 2020-01-11 RX ORDER — TRAMADOL HYDROCHLORIDE 50 MG/1
50 TABLET ORAL EVERY 6 HOURS
Refills: 0 | Status: DISCONTINUED | OUTPATIENT
Start: 2020-01-11 | End: 2020-01-11

## 2020-01-11 RX ORDER — SODIUM CHLORIDE 9 MG/ML
1000 INJECTION, SOLUTION INTRAVENOUS
Refills: 0 | Status: DISCONTINUED | OUTPATIENT
Start: 2020-01-11 | End: 2020-01-12

## 2020-01-11 RX ORDER — TRAMADOL HYDROCHLORIDE 50 MG/1
50 TABLET ORAL EVERY 12 HOURS
Refills: 0 | Status: DISCONTINUED | OUTPATIENT
Start: 2020-01-11 | End: 2020-01-13

## 2020-01-11 RX ORDER — HEPARIN SODIUM 5000 [USP'U]/ML
900 INJECTION INTRAVENOUS; SUBCUTANEOUS
Qty: 25000 | Refills: 0 | Status: DISCONTINUED | OUTPATIENT
Start: 2020-01-11 | End: 2020-01-12

## 2020-01-11 RX ORDER — POLYETHYLENE GLYCOL 3350 17 G/17G
17 POWDER, FOR SOLUTION ORAL DAILY
Refills: 0 | Status: DISCONTINUED | OUTPATIENT
Start: 2020-01-11 | End: 2020-01-13

## 2020-01-11 RX ADMIN — CHLORHEXIDINE GLUCONATE 15 MILLILITER(S): 213 SOLUTION TOPICAL at 05:14

## 2020-01-11 RX ADMIN — TRAMADOL HYDROCHLORIDE 50 MILLIGRAM(S): 50 TABLET ORAL at 19:26

## 2020-01-11 RX ADMIN — BRIMONIDINE TARTRATE 1 DROP(S): 2 SOLUTION/ DROPS OPHTHALMIC at 21:02

## 2020-01-11 RX ADMIN — HEPARIN SODIUM 9 UNIT(S)/HR: 5000 INJECTION INTRAVENOUS; SUBCUTANEOUS at 14:20

## 2020-01-11 RX ADMIN — LATANOPROST 1 DROP(S): 0.05 SOLUTION/ DROPS OPHTHALMIC; TOPICAL at 11:06

## 2020-01-11 RX ADMIN — TRAMADOL HYDROCHLORIDE 50 MILLIGRAM(S): 50 TABLET ORAL at 19:56

## 2020-01-11 RX ADMIN — Medication 200 MILLIGRAM(S): at 11:07

## 2020-01-11 RX ADMIN — MEROPENEM 100 MILLIGRAM(S): 1 INJECTION INTRAVENOUS at 21:02

## 2020-01-11 RX ADMIN — BRIMONIDINE TARTRATE 1 DROP(S): 2 SOLUTION/ DROPS OPHTHALMIC at 14:24

## 2020-01-11 RX ADMIN — HYDROMORPHONE HYDROCHLORIDE 0.5 MILLIGRAM(S): 2 INJECTION INTRAMUSCULAR; INTRAVENOUS; SUBCUTANEOUS at 01:26

## 2020-01-11 RX ADMIN — HEPARIN SODIUM 9 UNIT(S)/HR: 5000 INJECTION INTRAVENOUS; SUBCUTANEOUS at 21:02

## 2020-01-11 RX ADMIN — BRIMONIDINE TARTRATE 1 DROP(S): 2 SOLUTION/ DROPS OPHTHALMIC at 05:14

## 2020-01-11 RX ADMIN — HYDROMORPHONE HYDROCHLORIDE 0.5 MILLIGRAM(S): 2 INJECTION INTRAMUSCULAR; INTRAVENOUS; SUBCUTANEOUS at 01:41

## 2020-01-11 RX ADMIN — Medication 4 MILLIGRAM(S): at 05:13

## 2020-01-11 RX ADMIN — FLUCONAZOLE 100 MILLIGRAM(S): 150 TABLET ORAL at 06:39

## 2020-01-11 RX ADMIN — CHLORHEXIDINE GLUCONATE 1 APPLICATION(S): 213 SOLUTION TOPICAL at 05:13

## 2020-01-11 RX ADMIN — HEPARIN SODIUM 9 UNIT(S)/HR: 5000 INJECTION INTRAVENOUS; SUBCUTANEOUS at 19:26

## 2020-01-11 RX ADMIN — PANTOPRAZOLE SODIUM 40 MILLIGRAM(S): 20 TABLET, DELAYED RELEASE ORAL at 11:07

## 2020-01-11 NOTE — PROGRESS NOTE ADULT - ASSESSMENT
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 Post op course c/b DGF s/p renal bx on 12/13 c/b perirenal hematoma/abscess s/p washout 1/1. Patient is s/p IR aspiration of persistent collection seen inferior to pelvic kidney, now with new-onset gross hematuria. Patient taken to OR after IR for explantation of transplanted kidney and repair of bladder perforation. Vascular surgery called intra-op and repaired the right external iliac artery was repaired with a bovine pericardial patch, repaired the right external iliac vein, performed a thrombectomy of the right lower extremity veins    Bladder gently irrigated through martinez to pink. Minimal clot evacuated    - Unclear level of urine production following explant of transplant kidney  - Monitor UOP  - Monitor urine color, irrigate martinez PRN and check bladder scan to determine urine volume.  - Will continue to follow

## 2020-01-11 NOTE — PROGRESS NOTE ADULT - ASSESSMENT
DDRT on 12/8, c/b influenza, organ site infection s/p washout now s/p transplant nephrectomy on 1/10/20 due to pseudoanurysm, bleeding and thrombosis.

## 2020-01-11 NOTE — PROGRESS NOTE ADULT - ASSESSMENT
KAYLA PA is a 49y Male with ESRD s/p DDRT on 12/8/19 with course complicated by delayed graft function requiring HD, and infected perinephric hematoma s/p washout on 1/1/2020 with worsening sepsis and transfer to ICU, s/p IR drainage of perinephric collection on 1/6. 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 He underwent operative repair of right external iliac artery with pericardial patch, transplant nephrectomy, RLE thrombectomy, and IVC filter on 1/9.     PLAN:    NEURO: acute pain  - Propofol for sedation  - Dilaudid prn for breakthrough pain.    RESPIRATORY:  acute respiratory failure  - Continue mechanical ventilation  - If CBC remains stable, attempt SBT this AM     CARDIOVASCULAR:   - Monitor hemodynamics  - Holding home hydralazine, metoprolol    GI/NUTRITION: no acute issues  - NPO   - Protonix for stress ulcer prophylaxis    GENITOURINARY/RENAL: s/p transplant nephrectomy   - D5 NS @ 30 ml/hr  - Monitor electrolytes  - Continue w/ HD for now  - f/u renal biopsy pathology  - Transplant meds: solumedrol IV 4 mg daily    HEMATOLOGIC: acute hemorrhagic anemia  - Trend CBC q4, coags q8    INFECTIOUS DISEASE: infected perinephric hematoma s/p explantation, washout  - Meropenem 500 mg daily  - Fluconazole 400 mg daily  - Ciprofloxacin 400mg daily  - give one dose od vancomycin 1/10     ENDOCRINE: no acute issues  - Monitor glucose on BMP    OPHTHALMOLOGY:  - Brimonidine/latanoprost drops

## 2020-01-11 NOTE — PROGRESS NOTE ADULT - SUBJECTIVE AND OBJECTIVE BOX
SICU DAILY PROGRESS NOTE    49y Male PMHx of ESRD on HD  (via LUE AVF), HTN, Gout, Glaucoma, PNET s/p robotic distal panc/splenectomy (at [place] 2015 by Dr. Young, followed by Dr. Raphael, [facility] Oncologist), RA with hand contractures. 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 Post op course c/b DGF requiring HD, thrombocytopenia, elevated LDH and Haptoglobulin. Schistocytes  on peripheral smear concerning for TMA. Envarsus held. Received Belatacept 12/13. Started on PLEX (12/12, 12/15). Underwent renal bx on 12/13 c/w profound ATN.  Found to have much improvement to levels, likely related to Tacrolimus. He was discharged home on 12/20/19 w/ outpatient HD.     He was now sent to ED by dialysis center after he was found to be febrile to 102, he did not get HD this AM, and last full HD session was on 12/26. Upon arrival, he was febrile to 101.7, other vitals were stable. He states that he had a cough that began 5-6 days ago that has now mostly resolved. He reports no other febrile episodes other than this morning. He admits to having one SOB episode earlier this week that resolved after he got HD. He also states that his mother was hospitalized earlier this week and was flu+. Currently makes less than 1 cup urine/daily. He currently denies SOB, CP, dyspnea, HA, dizziness, N/V, diarrhea, constipation.     1/1/2020 was taken to OR and found to have a perinephric hematoma. Underwent washout, evacuation of hematoma, and biopsy of transplanted kidney. He was recovering on the floor when overnight he spiked a fever to 38.1 and had 18 beats of wide complex tachycardia. He was also found to have worsening leukocytosis and became acutely hypotensive. A CT scan was obtained demonstrating a 4.5x3.7 perinephric collection. IR was consulted for drainage and SICU was consulted for hemodynamic monitoring.      24 HOUR EVENTS:  - started IVF at 30cc/hr   - decreased vent settings to minimal with FiO2 at 30%   - solumedrol 4mg IV started   - trended H/H, given 1U pRBCs due to drop, responded appropiately   - give 1 dose of vancomycin today     SUBJECTIVE/ROS:  [ ] A ten-point review of systems was otherwise negative except as noted.  [x ] Due to altered mental status/intubation, subjective information were not able to be obtained from the patient. History was obtained, to the extent possible, from review of the chart and collateral sources of information.      NEURO  Exam: arousalable but sedated   Meds: HYDROmorphone  Injectable 0.5 milliGRAM(s) IV Push every 2 hours PRN Moderate Pain (4 - 6)  propofol Infusion 50 MICROgram(s)/kG/Min IV Continuous <Continuous>    [x] Adequacy of sedation and pain control has been assessed and adjusted      RESPIRATORY  RR: 12 (01-11-20 @ 04:00) (12 - 28)  SpO2: 100% (01-11-20 @ 04:00) (92% - 100%)  Wt(kg): --  Exam: intubated   Mechanical Ventilation: Mode: AC/ CMV (Assist Control/ Continuous Mandatory Ventilation), RR (machine): 12, RR (patient): 16, TV (machine): 500, FiO2: 30, PEEP: 5, ITime: 1, MAP: 8, PIP: 22  ABG - ( 10 [place] 2020 22:23 )  pH: 7.39  /  pCO2: 37    /  pO2: 157   / HCO3: 22    / Base Excess: -2.0  /  SaO2: 99      Lactate: x        CARDIOVASCULAR  HR: 71 (01-11-20 @ 04:00) (66 - 81)  BP: 111/56 (01-10-20 @ 07:00) (111/56 - 111/56)  BP(mean): 77 (01-10-20 @ 07:00) (77 - 77)  ABP: 107/48 (01-11-20 @ 04:00) (99/47 - 136/63)  ABP(mean): 70 (01-11-20 @ 04:00) (65 - 94)  Wt(kg): --  CVP(cm H2O): --      Exam: regular rate and rhythm  Cardiac Rhythm: sinus  Perfusion     [x]Adequate   [ ]Inadequate  Mentation   [x]Normal       [ ]Reduced  Extremities  [x]Warm         [ ]Cool  Volume Status [ ]Hypervolemic [x]Euvolemic [ ]Hypovolemic  Meds:       GI/NUTRITION  Exam: soft, nontender, nondistended, right groin dressing  C/D/I  Diet: NPO   Meds: pantoprazole  Injectable 40 milliGRAM(s) IV Push daily      GENITOURINARY  I&O's Detail    01-09 @ 07:01  -  01-10 @ 07:00  --------------------------------------------------------  IN:    heparin Infusion: 120 mL    Packed Red Blood Cells: 700 mL    Plasma: 300 mL    Platelets - Single Donor: 225 mL    propofol Infusion: 34.3 mL    sodium chloride 2%: 500 mL    Solution: 350 mL  Total IN: 2229.3 mL    OUT:    Continuous Bladder Irrigation: 3000 mL    Indwelling Catheter - Urethral: 590 mL    Other: 1000 mL  Total OUT: 4590 mL    Total NET: -2360.7 mL      01-10 @ 07:01 - 01-11 @ 04:19  --------------------------------------------------------  IN:    dextrose 5% + sodium chloride 0.9%.: 570 mL    Packed Red Blood Cells: 350 mL    propofol Infusion: 279.3 mL    Solution: 900 mL  Total IN: 2099.3 mL    OUT:    Indwelling Catheter - Urethral: 5 mL  Total OUT: 5 mL    Total NET: 2094.3 mL          01-10    134<L>  |  102  |  25<H>  ----------------------------<  110<H>  4.8   |  19<L>  |  5.05<H>    Ca    7.4<L>      10 [place] 2020 22:35  Phos  5.8     01-10  Mg     2.4     01-10    TPro  3.5<L>  /  Alb  1.4<L>  /  TBili  2.0<H>  /  DBili  1.4<H>  /  AST  7<L>  /  ALT  5<L>  /  AlkPhos  88  01-10    [x ] Kennedy catheter, indication:  Meds: dextrose 5% + sodium chloride 0.9%. 1000 milliLiter(s) IV Continuous <Continuous>        HEMATOLOGIC  Meds:   [x] VTE Prophylaxis                        8.8    17.60 )-----------( 279      ( 10 [place] 2020 22:35 )             25.2     PT/INR - ( 10 [place] 2020 22:35 )   PT: 11.3 sec;   INR: 0.98 ratio         PTT - ( 10 [place] 2020 22:35 )  PTT:29.1 sec  Transfusion     [ ] PRBC   [ ] Platelets   [ ] FFP   [ ] Cryoprecipitate      INFECTIOUS DISEASES  WBC Count: 17.60 K/uL (01-10 @ 22:35)  WBC Count: 16.92 K/uL (01-10 @ 16:47)  WBC Count: 16.80 K/uL (01-10 @ 13:07)  WBC Count: 19.23 K/uL (01-10 @ 08:38)  WBC Count: 22.10 K/uL (01-10 @ 04:40)    RECENT CULTURES:  Specimen Source: .Tissue Other  Date/Time: 01-10 @ 06:07  Culture Results: --  Gram Stain:   No polymorphonuclear cells seen per low power field  No organisms seen per oil power field  Organism: --  Specimen Source: .Blood Blood-Venous  Date/Time: 01-07 @ 13:23  Culture Results:   No growth to date.  Gram Stain: --  Organism: --  Specimen Source: .Urine Catheterized  Date/Time: 01-07 @ 00:18  Culture Results:   <10,000 CFU/mL Normal Urogenital Gillian  Gram Stain: --  Organism: --  Specimen Source: Abdominal Fl Abdominal Fluid  Date/Time: 01-06 @ 21:03  Culture Results:   No growth  Gram Stain:   No polymorphonuclear leukocytes seen  No organisms seen  by cytocentrifuge  Organism: --    Meds: ciprofloxacin   IVPB 400 milliGRAM(s) IV Intermittent every 24 hours  fluconAZOLE IVPB 400 milliGRAM(s) IV Intermittent every 24 hours  meropenem  IVPB 500 milliGRAM(s) IV Intermittent every 24 hours        ENDOCRINE  CAPILLARY BLOOD GLUCOSE        Meds: methylPREDNISolone sodium succinate Injectable 4 milliGRAM(s) IV Push daily        ACCESS DEVICES:  [ ] Peripheral IV  [ ] Central Venous Line	[ ] R	[ ] L	[ ] IJ	[ ] Fem	[ ] SC	Placed:   [ ] Arterial Line		[ ] R	[ ] L	[ ] Fem	[ ] Rad	[ ] Ax	Placed:   [ ] PICC:					[ ] Mediport  [ ] Urinary Catheter, Date Placed:   [x] Necessity of urinary, arterial, and venous catheters discussed    OTHER MEDICATIONS:  brimonidine 0.2% Ophthalmic Solution 1 Drop(s) Both EYES three times a day  chlorhexidine 0.12% Liquid 15 milliLiter(s) Oral Mucosa every 12 hours  chlorhexidine 2% Cloths 1 Application(s) Topical <User Schedule>  latanoprost 0.005% Ophthalmic Solution 1 Drop(s) Both EYES daily  lidocaine 2% Jelly 1 milliLiter(s) IntraUrethral two times a day PRN      CODE STATUS:    full code

## 2020-01-11 NOTE — PROGRESS NOTE ADULT - ASSESSMENT
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     RVP with Influenza A.   CXR Clear  s/p Tamiflu treatment course    UCx with > 100K Enterobacter  s/p Ex-Lap and washout of infected hematoma on 1/1  Growth of Enterobacter from surgical cultures  2 of the isolates intermediate/resistant to Ertapenem but rest susceptible  Unclear significance of this    s/p IR guided drainage attempt of hematoma on 1/6  Significant hematuria with clots after procedure    On 1/10 underwent transplant nephrectomy, repair of right external iliac artery with a bovine pericardial patch, repair of right external iliac vein and thrombectomy of right lower extremity veins.     Possible infected pseudoaneurysm and thrombosis around transplant kidney.   Multiple surgical cultures sent - I would follow up on result  I suspect Enterobacter is the primary pathogen  Can continue current regimen pending cultures     I would hold off on further Vancomycin    Overall, Leukocytosis, Infected Hematoma, Positive Urine Culture, Influenza Infection, Fever, Renal Transplant Recipient    --Continue Ciprofloxacin 400 mg IV Q24  --Continue Meropenem 500 mg IV Q24H  --Can continue fluconazole pending surgical cultures but suspicion low for fungal etiology  --I would hold off on further Vancomycin  --Continue to follow CBC with diff  --Continue to follow temperature curve  --Follow up on surgical cultures   --Follow up on prelim blood cultures    I will continue to follow. Please feel free to contact me with any further questions.    Dada Jacobo M.D.  [facility] Division of Infectious Disease  8AM-5PM: Pager Number [phone]  After Hours (or if no response): Please contact the Infectious Diseases Office at [phone]

## 2020-01-11 NOTE — PROGRESS NOTE ADULT - PROBLEM SELECTOR PLAN 4
Enterobacter organ site infection on meropenem and cipro. Can stop vanco.  Cont fluconazole for now.

## 2020-01-11 NOTE — PROGRESS NOTE ADULT - SUBJECTIVE AND OBJECTIVE BOX
UROLOGY DAILY PROGRESS NOTE:     Subjective:    Patient feeling okay. No complaints at this time.  Tolerating martinez catheter.    Objective:    NAD, awake and alert  Respirations nonlabored  Abdomen soft, appropriately tender  Martinez with minimal bloody urine    MEDICATIONS  (STANDING):  brimonidine 0.2% Ophthalmic Solution 1 Drop(s) Both EYES three times a day  chlorhexidine 2% Cloths 1 Application(s) Topical <User Schedule>  ciprofloxacin   IVPB 400 milliGRAM(s) IV Intermittent every 24 hours  dextrose 5% + sodium chloride 0.9%. 1000 milliLiter(s) (30 mL/Hr) IV Continuous <Continuous>  fluconAZOLE IVPB 400 milliGRAM(s) IV Intermittent every 24 hours  heparin  Infusion 900 Unit(s)/Hr (9 mL/Hr) IV Continuous <Continuous>  latanoprost 0.005% Ophthalmic Solution 1 Drop(s) Both EYES daily  meropenem  IVPB 500 milliGRAM(s) IV Intermittent every 24 hours  methylPREDNISolone sodium succinate Injectable 4 milliGRAM(s) IV Push daily  pantoprazole  Injectable 40 milliGRAM(s) IV Push daily    MEDICATIONS  (PRN):  HYDROmorphone  Injectable 0.5 milliGRAM(s) IV Push every 2 hours PRN Moderate Pain (4 - 6)  lidocaine 2% Jelly 1 milliLiter(s) IntraUrethral two times a day PRN irritation      Vital Signs Last 24 Hrs  T(C): 36.7 (2020 12:30), Max: 36.7 (2020 12:30)  T(F): 98 (2020 12:30), Max: 98 (2020 12:30)  HR: 79 (2020 12:30) (69 - 79)  BP: 134/62 (2020 07:00) (134/62 - 134/62)  BP(mean): 89 (2020 07:00) (89 - 89)  RR: 14 (2020 12:30) (12 - 17)  SpO2: 100% (2020 12:30) (100% - 100%)    I&O's Detail    10 [place] 2020 07:01  -  2020 07:00  --------------------------------------------------------  IN:    dextrose 5% + sodium chloride 0.9%.: 660 mL    Packed Red Blood Cells: 350 mL    propofol Infusion: 315.5 mL    Solution: 900 mL  Total IN: 2225.5 mL    OUT:    Indwelling Catheter - Urethral: 5 mL  Total OUT: 5 mL    Total NET: 2220.5 mL      2020 07:01  -  2020 13:14  --------------------------------------------------------  IN:    dextrose 5% + sodium chloride 0.9%.: 90 mL    propofol Infusion: 13.4 mL  Total IN: 103.4 mL    OUT:  Total OUT: 0 mL    Total NET: 103.4 mL          Daily     Daily Weight in k.5 (2020 12:30)    LABS:                        8.7    18.21 )-----------( 313      ( 2020 11:28 )             25.4     01-11    129<L>  |  99  |  29<H>  ----------------------------<  109<H>  4.9   |  19<L>  |  5.58<H>    Ca    8.0<L>      2020 11:28  Phos  6.3       Mg     2.5     11    TPro  4.0<L>  /  Alb  1.7<L>  /  TBili  0.8  /  DBili  0.6<H>  /  AST  8<L>  /  ALT  5<L>  /  AlkPhos  83  11    PT/INR - ( 2020 11:28 )   PT: 10.6 sec;   INR: 0.93 ratio         PTT - ( 2020 11:28 )  PTT:29.7 sec

## 2020-01-11 NOTE — PROGRESS NOTE ADULT - ATTENDING COMMENTS
Evaluated in round  Awake and alert off sedation, met criteria for extubation s/p extubated tolerating well  Hemodynamically stable  Mild drop in HCT improved without transfusion, will start Heparin drip gtt if HCT remains stable for DVT, lower extremity  warm  Abx Meropenem, Cipro and one more day of Diflucan, tissue culture Enterobacter cloacae  Can DC IVF once on PO  HD today  Wife kept updated  Discussed with multidisciplinary team.

## 2020-01-11 NOTE — PROGRESS NOTE ADULT - SUBJECTIVE AND OBJECTIVE BOX
[facility] -- Melissa Kelsey, Maximino Nunez Pannella, Patel, Savella  Office # [phone]      Follow Up:  resp failure, AF    Subjective/Observations: Patient seen and examined. Events noted. Resting in bed. The patient is intubated and requiring mechanical ventilatory support.       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  Carpal tunnel syndrome  Brain cyst: had MRI recently- now gone  Anemia  Gastric ulcer: Long time ago  Rheumatoid arthritis  Renal transplant recipient  Breakdown (mechanical) of penile (implanted) prosthesis, sequela  End-stage renal disease (ESRD): PERMACATH PLACEMENT  Abscess of left buttock: s/p I &amp; D  AV fistula: placement left lower arm  S/P cystoscopy: stent placement &amp; removal for kidney stones, lithiotripsy  s/p Lt Carpal tunnel: 2011      MEDICATIONS  (STANDING):  brimonidine 0.2% Ophthalmic Solution 1 Drop(s) Both EYES three times a day  chlorhexidine 2% Cloths 1 Application(s) Topical <User Schedule>  ciprofloxacin   IVPB 400 milliGRAM(s) IV Intermittent every 24 hours  fluconAZOLE IVPB 400 milliGRAM(s) IV Intermittent every 24 hours  heparin  Infusion 900 Unit(s)/Hr (9 mL/Hr) IV Continuous <Continuous>  latanoprost 0.005% Ophthalmic Solution 1 Drop(s) Both EYES daily  meropenem  IVPB 500 milliGRAM(s) IV Intermittent every 24 hours  methylPREDNISolone sodium succinate Injectable 4 milliGRAM(s) IV Push daily  pantoprazole  Injectable 40 milliGRAM(s) IV Push daily  polyethylene glycol 3350 17 Gram(s) Oral daily    MEDICATIONS  (PRN):  bisacodyl Suppository 10 milliGRAM(s) Rectal daily PRN Constipation  HYDROmorphone  Injectable 0.5 milliGRAM(s) IV Push every 2 hours PRN Moderate Pain (4 - 6)  lidocaine 2% Jelly 1 milliLiter(s) IntraUrethral two times a day PRN irritation  traMADol 50 milliGRAM(s) Oral every 12 hours PRN Severe Pain (7 - 10)      Allergies    coconut (Anaphylaxis)  morphine (Pruritus; Rash)    Intolerances            Vital Signs Last 24 Hrs  T(C): 36.4 (11 Jan 2020 15:30), Max: 36.7 (11 Jan 2020 12:30)  T(F): 97.5 (11 Jan 2020 15:30), Max: 98 (11 Jan 2020 12:30)  HR: 82 (11 Jan 2020 17:00) (69 - 82)  BP: 134/62 (11 Jan 2020 07:00) (134/62 - 134/62)  BP(mean): 89 (11 Jan 2020 07:00) (89 - 89)  RR: 27 (11 Jan 2020 17:00) (12 - 27)  SpO2: 100% (11 Jan 2020 17:00) (96% - 100%)    I&O's Summary    10 [place] 2020 07:01  -  11 Jan 2020 07:00  --------------------------------------------------------  IN: 2225.5 mL / OUT: 5 mL / NET: 2220.5 mL    11 Jan 2020 07:01  -  11 Jan 2020 18:06  --------------------------------------------------------  IN: 930.4 mL / OUT: 2930 mL / NET: -1999.6 mL          PHYSICAL EXAM:  TELE: SR PAF  Constitutional: sedated, intubated  HEENT: + ETT  Pulmonary: Decreased breath sounds b/l. No rales, crackles or wheeze appreciated.   Cardiovascular: Regular, S1 and S2, No murmurs, rubs, gallops or clicks  Gastrointestinal: Bowel Sounds present, soft, nontender.   Lymph: No peripheral edema. No lymphadenopathy.  Skin: No visible rashes or ulcers.  Psych:  unable to assess    LABS: All Labs Reviewed:                        8.8    17.47 )-----------( 325      ( 11 Jan 2020 17:23 )             25.5                         8.7    18.21 )-----------( 313      ( 11 Jan 2020 11:28 )             25.4                         8.1    18.25 )-----------( 258      ( 11 Jan 2020 04:59 )             22.8     11 Jan 2020 17:23    138    |  100    |  13     ----------------------------<  109    4.1     |  23     |  3.21   11 Jan 2020 11:28    129    |  99     |  29     ----------------------------<  109    4.9     |  19     |  5.58   11 Jan 2020 04:59    134    |  102    |  26     ----------------------------<  99     4.7     |  19     |  5.36     Ca    8.0        11 Jan 2020 17:23  Ca    8.0        11 Jan 2020 11:28  Ca    7.4        11 Jan 2020 04:59  Phos  4.0       11 Jan 2020 17:23  Phos  6.3       11 Jan 2020 11:28  Phos  6.1       11 Jan 2020 04:59  Mg     2.1       11 Jan 2020 17:23  Mg     2.5       11 Jan 2020 11:28  Mg     2.4       11 Jan 2020 04:59    TPro  4.0    /  Alb  1.7    /  TBili  0.8    /  DBili  0.6    /  AST  8      /  ALT  5      /  AlkPhos  83     11 Jan 2020 04:59  TPro  3.5    /  Alb  1.4    /  TBili  2.0    /  DBili  1.4    /  AST  7      /  ALT  5      /  AlkPhos  88     10 [place] 2020 04:40  TPro  6.1    /  Alb  2.5    /  TBili  0.8    /  DBili  0.4    /  AST  16     /  ALT  <5     /  AlkPhos  183    09 Jan 2020 03:52    PT/INR - ( 11 Jan 2020 17:23 )   PT: 11.3 sec;   INR: 0.99 ratio         PTT - ( 11 Jan 2020 17:23 )  PTT:46.3 sec  CARDIAC MARKERS ( 10 [place] 2020 04:40 )  x     / x     / 58 U/L / x     / x

## 2020-01-11 NOTE — PROGRESS NOTE ADULT - SUBJECTIVE AND OBJECTIVE BOX
[facility] NEPHROLOGY SERVICES, [facility]  NEPHROLOGY AND HYPERTENSION  [address]  [address]  [place] [address]  [phone]    MD GIANA UGERRERO MD ANDREY GONCHARUK, MD MADHU KORRAPATI, MD YELENA ROSENBERG, MD BINNY KOSHY, MD CHRISTOPHER CAPUTO, MD CATRINA WOO MD          Patient feels ok, denies SOB  Patient is post transplant nephrectomy overnight, along with endovascular repair and IVC filter placement.  Refusing UF today    MEDICATIONS  (STANDING):  brimonidine 0.2% Ophthalmic Solution 1 Drop(s) Both EYES three times a day  chlorhexidine 2% Cloths 1 Application(s) Topical <User Schedule>  ciprofloxacin   IVPB 400 milliGRAM(s) IV Intermittent every 24 hours  dextrose 5% + sodium chloride 0.9%. 1000 milliLiter(s) (30 mL/Hr) IV Continuous <Continuous>  fluconAZOLE IVPB 400 milliGRAM(s) IV Intermittent every 24 hours  latanoprost 0.005% Ophthalmic Solution 1 Drop(s) Both EYES daily  meropenem  IVPB 500 milliGRAM(s) IV Intermittent every 24 hours  methylPREDNISolone sodium succinate Injectable 4 milliGRAM(s) IV Push daily  pantoprazole  Injectable 40 milliGRAM(s) IV Push daily    MEDICATIONS  (PRN):  HYDROmorphone  Injectable 0.5 milliGRAM(s) IV Push every 2 hours PRN Moderate Pain (4 - 6)  lidocaine 2% Jelly 1 milliLiter(s) IntraUrethral two times a day PRN irritation      01-10-20 @ 07:01  -  01-11-20 @ 07:00  --------------------------------------------------------  IN: 2225.5 mL / OUT: 5 mL / NET: 2220.5 mL    01-11-20 @ 07:01 - 01-11-20 @ 11:08  --------------------------------------------------------  IN: 103.4 mL / OUT: 0 mL / NET: 103.4 mL      PHYSICAL EXAM:      T(C): 36.1 (01-11-20 @ 07:00), Max: 36.3 (01-10-20 @ 20:00)  HR: 75 (01-11-20 @ 10:00) (68 - 79)  BP: 134/62 (01-11-20 @ 07:00) (134/62 - 134/62)  RR: 16 (01-11-20 @ 10:00) (12 - 17)  SpO2: 100% (01-11-20 @ 10:00) (100% - 100%)  Wt(kg): --  Respiratory: mild scattered rhonchi anteriorly, decreased BS at bases  Cardiovascular: S1 S2  Gastrointestinal: soft NT ND +BS  Extremities:  R>L edema  + AVF                                    8.1    18.25 )-----------( 258      ( 11 Jan 2020 04:59 )             22.8     01-11    134<L>  |  102  |  26<H>  ----------------------------<  99  4.7   |  19<L>  |  5.36<H>    Ca    7.4<L>      11 Jan 2020 04:59  Phos  6.1     01-11  Mg     2.4     01-11    TPro  4.0<L>  /  Alb  1.7<L>  /  TBili  0.8  /  DBili  0.6<H>  /  AST  8<L>  /  ALT  5<L>  /  AlkPhos  83  01-11    ABG - ( 11 Jan 2020 04:43 )  pH, Arterial: 7.40  pH, Blood: x     /  pCO2: 36    /  pO2: 171   / HCO3: 22    / Base Excess: -1.9  /  SaO2: 99                LIVER FUNCTIONS - ( 11 Jan 2020 04:59 )  Alb: 1.7 g/dL / Pro: 4.0 g/dL / ALK PHOS: 83 U/L / ALT: 5 U/L / AST: 8 U/L / GGT: x           Creatinine Trend: 5.36<--, 5.05<--, 4.73<--, 4.79<--, 4.52<--, 4.21<--    A/P:    Adm w/Flu A 12/27  Hx ESRD on HD  S/p DDRT 12/8/2019, DGF  S/p transplant kidney bx 12/13: ATN, focal TMA  S/p PLEX 12/13 and 12/15  Araceli-nephric hematoma noted on ultrasound 12/27, increased from prior  S/p OR 1/1 for washout and repeat renal graft biopsy  Repeat renal bx c/w ATN  Infected araceli-nephric hematoma, s/p drainage w/IR  RLE DVT  Gross hematuria  S/p OR  for infected retro-peritoneal hematoma, hemorrhage involving pseudo-aneurysm of anastomosis of renal artery to external iliac vein,  s/p transplant nephrectomy  S/p IVCF   Abx per ID    Patient refusing UF today, will ask to consider tomorrow; no immediate indication.      Fam Esteban MD [facility] NEPHROLOGY SERVICES, [facility]  NEPHROLOGY AND HYPERTENSION  [address]  [address]  [place] [address]  [phone]    MD GIANA GUERRERO MD ANDREY GONCHARUK, MD MADHU KORRAPATI, MD YELENA ROSENBERG, MD BINNY KOSHY, MD CHRISTOPHER CAPUTO, MD CATRINA WOO MD          Patient feels ok, denies SOB  Patient is post transplant nephrectomy, along with endovascular repair and IVC filter placement.  For HD today    MEDICATIONS  (STANDING):  brimonidine 0.2% Ophthalmic Solution 1 Drop(s) Both EYES three times a day  chlorhexidine 2% Cloths 1 Application(s) Topical <User Schedule>  ciprofloxacin   IVPB 400 milliGRAM(s) IV Intermittent every 24 hours  dextrose 5% + sodium chloride 0.9%. 1000 milliLiter(s) (30 mL/Hr) IV Continuous <Continuous>  fluconAZOLE IVPB 400 milliGRAM(s) IV Intermittent every 24 hours  latanoprost 0.005% Ophthalmic Solution 1 Drop(s) Both EYES daily  meropenem  IVPB 500 milliGRAM(s) IV Intermittent every 24 hours  methylPREDNISolone sodium succinate Injectable 4 milliGRAM(s) IV Push daily  pantoprazole  Injectable 40 milliGRAM(s) IV Push daily    MEDICATIONS  (PRN):  HYDROmorphone  Injectable 0.5 milliGRAM(s) IV Push every 2 hours PRN Moderate Pain (4 - 6)  lidocaine 2% Jelly 1 milliLiter(s) IntraUrethral two times a day PRN irritation      01-10-20 @ 07:01  -  01-11-20 @ 07:00  --------------------------------------------------------  IN: 2225.5 mL / OUT: 5 mL / NET: 2220.5 mL    01-11-20 @ 07:01  -  01-11-20 @ 11:08  --------------------------------------------------------  IN: 103.4 mL / OUT: 0 mL / NET: 103.4 mL      PHYSICAL EXAM:      T(C): 36.1 (01-11-20 @ 07:00), Max: 36.3 (01-10-20 @ 20:00)  HR: 75 (01-11-20 @ 10:00) (68 - 79)  BP: 134/62 (01-11-20 @ 07:00) (134/62 - 134/62)  RR: 16 (01-11-20 @ 10:00) (12 - 17)  SpO2: 100% (01-11-20 @ 10:00) (100% - 100%)  Wt(kg): --  Respiratory: mild scattered rhonchi anteriorly, decreased BS at bases  Cardiovascular: S1 S2  Gastrointestinal: soft NT ND +BS  Extremities:  R>L edema  + AVF                                    8.1    18.25 )-----------( 258      ( 11 Jan 2020 04:59 )             22.8     01-11    134<L>  |  102  |  26<H>  ----------------------------<  99  4.7   |  19<L>  |  5.36<H>    Ca    7.4<L>      11 Jan 2020 04:59  Phos  6.1     01-11  Mg     2.4     01-11    TPro  4.0<L>  /  Alb  1.7<L>  /  TBili  0.8  /  DBili  0.6<H>  /  AST  8<L>  /  ALT  5<L>  /  AlkPhos  83  01-11    ABG - ( 11 Jan 2020 04:43 )  pH, Arterial: 7.40  pH, Blood: x     /  pCO2: 36    /  pO2: 171   / HCO3: 22    / Base Excess: -1.9  /  SaO2: 99                LIVER FUNCTIONS - ( 11 Jan 2020 04:59 )  Alb: 1.7 g/dL / Pro: 4.0 g/dL / ALK PHOS: 83 U/L / ALT: 5 U/L / AST: 8 U/L / GGT: x           Creatinine Trend: 5.36<--, 5.05<--, 4.73<--, 4.79<--, 4.52<--, 4.21<--    A/P:    Adm w/Flu A 12/27  Hx ESRD on HD, for today as ordered; UF as tolerated  S/p DDRT 12/8/2019, DGF  S/p transplant kidney bx 12/13: ATN, focal TMA  S/p PLEX 12/13 and 12/15  Araceli-nephric hematoma noted on ultrasound 12/27, increased from prior  S/p OR 1/1 for washout and repeat renal graft biopsy  Repeat renal bx c/w ATN  Infected araceli-nephric hematoma, s/p drainage w/IR  RLE DVT  Gross hematuria  S/p OR  for infected retro-peritoneal hematoma, hemorrhage involving pseudo-aneurysm of anastomosis of renal artery to external iliac vein,  s/p transplant nephrectomy  S/p IVCF   Abx per ID        Fam Esteban MD

## 2020-01-11 NOTE — PROGRESS NOTE ADULT - ATTENDING COMMENTS
agree with above  extubated and looking better  right leg swelling; foot warm  continue antibiotics  start anticoagulation

## 2020-01-11 NOTE — PROGRESS NOTE ADULT - ASSESSMENT
49 year old male with ESRD on HD, now s/p ddrt on 12/8/2019 course complicated by TMA/profound ATN, who presents from a HD center with a fever and cough. He was initially admitted with influenza. He is s/p washout of infected collection and biopsy which revealed ATN.   He is now found to have a diffusely enlarging perinephric fluid collection and is awaiting drainage.    While on telemetry, he had an episode of a wide complex tachycardia. It initially appears irregular, suggesting an atrial arrhythmia with aberrancy, though the remainder appears more like an episode of non-sustained VT.   he is s/p transplant nephrectomy and washout of retroperitoneum/hematoma evaluation with repair of bladder perforation, along with repair of right external iliac artery/vein and thrombectomy.    - now intubated and sedated.  - continue to watch on telemetry  - Some short bursts of PAF.   - hold metoprolol in the setting of hypotension  - watch creatinine and electrolytes. Keep K>4, Mg>2  - can check echocardiogram. Last echocardiogram in 1/2019 with normal LV function      - now with dvt, with bleed on heparin gtt, s/p repair of right external iliac artery/vein and thrombectomy  - labile hgb, trend carefully  - cont hep gtt  - is at risk of abrupt decompensation noting multisystem abnormalities, wct, new dvt  - will follow with you

## 2020-01-11 NOTE — PROGRESS NOTE ADULT - SUBJECTIVE AND OBJECTIVE BOX
[facility] DIVISION OF KIDNEY DISEASES AND HYPERTENSION -- FOLLOW UP NOTE  --------------------------------------------------------------------------------  Chief Complaint:  kidney transplant    24 hour events/subjective:  Pt had graft nephrectomy.  He was extubated today.  Feels uncomfortable.       PAST HISTORY  --------------------------------------------------------------------------------  No significant changes to PMH, PSH, FHx, SHx, unless otherwise noted    ALLERGIES & MEDICATIONS  --------------------------------------------------------------------------------  Allergies    coconut (Anaphylaxis)  morphine (Pruritus; Rash)    Intolerances      Standing Inpatient Medications  brimonidine 0.2% Ophthalmic Solution 1 Drop(s) Both EYES three times a day  chlorhexidine 2% Cloths 1 Application(s) Topical <User Schedule>  ciprofloxacin   IVPB 400 milliGRAM(s) IV Intermittent every 24 hours  dextrose 5% + sodium chloride 0.9%. 1000 milliLiter(s) IV Continuous <Continuous>  fluconAZOLE IVPB 400 milliGRAM(s) IV Intermittent every 24 hours  latanoprost 0.005% Ophthalmic Solution 1 Drop(s) Both EYES daily  meropenem  IVPB 500 milliGRAM(s) IV Intermittent every 24 hours  methylPREDNISolone sodium succinate Injectable 4 milliGRAM(s) IV Push daily  pantoprazole  Injectable 40 milliGRAM(s) IV Push daily    PRN Inpatient Medications  HYDROmorphone  Injectable 0.5 milliGRAM(s) IV Push every 2 hours PRN  lidocaine 2% Jelly 1 milliLiter(s) IntraUrethral two times a day PRN      REVIEW OF SYSTEMS  --------------------------------------------------------------------------------  Gen: weak  Skin: No rashes  Head/Eyes/Ears/Mouth: No headache;No sore throat  Respiratory: No dyspnea, cough,   CV: No chest pain, PND, orthopnea  GI: pain by incision site  : anuric  MSK: RLE edema  Neuro: No dizziness/lightheadedness, weakness, seizures, numbness, tingling  Psych: No significant nervousness, anxiety, stress, depression    All other systems were reviewed and are negative, except as noted.    VITALS/PHYSICAL EXAM  --------------------------------------------------------------------------------  T(C): 36.1 (01-11-20 @ 07:00), Max: 36.3 (01-10-20 @ 20:00)  HR: 75 (01-11-20 @ 10:00) (68 - 79)  BP: 134/62 (01-11-20 @ 07:00) (134/62 - 134/62)  RR: 16 (01-11-20 @ 10:00) (12 - 17)  SpO2: 100% (01-11-20 @ 10:00) (100% - 100%)  Wt(kg): --  Height (cm): 180.34 (01-09-20 @ 20:33)  Weight (kg): 63.5 (01-09-20 @ 20:33)  BMI (kg/m2): 19.5 (01-09-20 @ 20:33)  BSA (m2): 1.81 (01-09-20 @ 20:33)      01-10-20 @ 07:01  -  01-11-20 @ 07:00  --------------------------------------------------------  IN: 2225.5 mL / OUT: 5 mL / NET: 2220.5 mL    01-11-20 @ 07:01  -  01-11-20 @ 11:41  --------------------------------------------------------  IN: 103.4 mL / OUT: 0 mL / NET: 103.4 mL      Physical Exam:  	Gen: NAD  	HEENT: PERRL, supple neck, clear oropharynx  	Pulm: CTA B/L  	CV: RRR, S1S2; no rub  	Back: No spinal or CVA tenderness; no sacral edema  	Abd: +BS, soft, transplant site with vac  	: No suprapubic tenderness  	UE: Warm, FROM; no edema; no asterixis  	LE: RLE with +2 LE edema  	Neuro: No focal deficits  	Psych: Normal affect and mood  	Skin: Warm, without rashes      LABS/STUDIES  --------------------------------------------------------------------------------              8.1    18.25 >-----------<  258      [01-11-20 @ 04:59]              22.8     134  |  102  |  26  ----------------------------<  99      [01-11-20 @ 04:59]  4.7   |  19  |  5.36        Ca     7.4     [01-11-20 @ 04:59]      Mg     2.4     [01-11-20 @ 04:59]      Phos  6.1     [01-11-20 @ 04:59]    TPro  4.0  /  Alb  1.7  /  TBili  0.8  /  DBili  0.6  /  AST  8   /  ALT  5   /  AlkPhos  83  [01-11-20 @ 04:59]    PT/INR: PT 11.1 , INR 0.97       [01-11-20 @ 04:59]  PTT: 28.8       [01-11-20 @ 04:59]    CK 58      [01-10-20 @ 04:40]    Creatinine Trend:  SCr 5.36 [01-11 @ 04:59]  SCr 5.05 [01-10 @ 22:35]  SCr 4.73 [01-10 @ 16:47]  SCr 4.79 [01-10 @ 13:07]  SCr 4.52 [01-10 @ 08:38]              Urinalysis - [12-29-19 @ 03:40]      Color Dark Brown / Appearance Turbid / SG 1.021 / pH 7.5      Gluc Negative / Ketone Trace  / Bili Small / Urobili <2 mg/dL       Blood Large / Protein 100 mg/dL / Leuk Est Large / Nitrite Negative      RBC >720 / WBC >720 / Hyaline 5 / Gran  / Sq Epi  / Non Sq Epi 8 / Bacteria Negative    Urine Creatinine <2      [01-09-20 @ 10:18]  Urine Sodium 155      [01-09-20 @ 10:18]  Urine Osmolality 286      [01-09-20 @ 10:18]    HbA1c 5.0      [12-11-19 @ 08:21]    HBsAg Nonreact      [12-12-19 @ 22:37]  HCV 0.15, Nonreact      [12-12-19 @ 22:37]

## 2020-01-11 NOTE — PROGRESS NOTE ADULT - SUBJECTIVE AND OBJECTIVE BOX
Transplant Surgery - Multidisciplinary Rounds  --------------------------------------------------------------  R DDRT    Date:  12/8/19  Dr. Espinal, readmitted 12/27/19 with +Flu/perinephretic collection  Ex Lap washout/ renal bx  Date:  1/1/20    Present:   Patient seen with multidisciplinary team including Transplant Surgeon: Dr. Cruz, Transplant Nephrologist: Dr. Puckett, Syed Montano and [facility] and examined with Dr. Cruz during am rounds. Disciplines not in attendance will be notified of the plan.      HPI: 49M PMHx of ESRD on HD  ( via LUE AVF), HTN, Gout, Glaucoma, NET of pancreas (s/p robotic distal panc/splenectomy at [place] 2015 by Dr. Young, followed by Dr. Raphael, [facility] Oncologist), RA with hand contractures. 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 Post op course c/b DGF requiring HD, thrombocytopenia, elevated LDH and Haptoglobulin. Schistocytes  on peripheral smear concerning for TMA. Envarsus held. Received Belatacept 12/13. Started on PLEX (12/12, 12/15). Underwent renal bx on 12/13 c/w profound ATN.  Found to have much improvement to levels, likely related to Tacrolimus. He was discharged home on 12/20/19 w/ outpatient HD.     Re-admitted 12/27 with influenza, completed treatement with Tamiflu on 12/31. Urine cx on admission grew Ent cloacae, was started on meropenem 12/31. Renal US on admission with increasing hematoma. OR on 1/1 for ex-lap, hematoma evacuation, washout and renal bx. (c/w ATN)  OR cxs grew Ent. clocae. Post op course c/b by fevers and leukocytosis, CT a/p (1/4) with increased perinephric collection. Underwent IR guided collection     cxs:   12/27: BC x2: negative  12/29: Urine Cx: Enterobacter Cloacae  1/1 OR Perinephric collection Cx:  Carbapenem resistant Ent Cloacae, E. Coli  1/1: R kidney abscess swab: Enterobacter Cloacae  1/1  OR tissue: Enterobacter Cloacae  1/4: BC x2: ngtd  1/5: Urine Cx: Enteropbacter Cloacae  1/5: Skin incision Cx (bedside):  ngtd  1/6: Adominal fluid Cx from IR:  ngtd  1/7: Urine Cx:  ngtd  1/7: BCX2:  ngtd      Interval events:         Potential Discharge date: pending clinical improvement       MEDICATIONS  (STANDING):  allopurinol 100 milliGRAM(s) Oral daily  brimonidine 0.2% Ophthalmic Solution 1 Drop(s) Both EYES three times a day  chlorhexidine 2% Cloths 1 Application(s) Topical <User Schedule>  ciprofloxacin   IVPB 400 milliGRAM(s) IV Intermittent every 24 hours  famotidine    Tablet 20 milliGRAM(s) Oral daily  fluconAZOLE   Tablet 400 milliGRAM(s) Oral daily  heparin  Infusion 1000 Unit(s)/Hr (12 mL/Hr) IV Continuous <Continuous>  latanoprost 0.005% Ophthalmic Solution 1 Drop(s) Both EYES daily  meropenem  IVPB 500 milliGRAM(s) IV Intermittent every 24 hours  oxybutynin 10 milliGRAM(s) Oral every 8 hours  polyethylene glycol 3350 17 Gram(s) Oral daily  predniSONE   Tablet 5 milliGRAM(s) Oral daily  senna 2 Tablet(s) Oral at bedtime  sodium chloride 2% . 1000 milliLiter(s) (50 mL/Hr) IV Continuous <Continuous>  trimethoprim   80 mG/sulfamethoxazole 400 mG 1 Tablet(s) Oral daily  valGANciclovir 450 milliGRAM(s) Oral <User Schedule>    MEDICATIONS  (PRN):  acetaminophen   Tablet .. 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  Carpal tunnel syndrome  Brain cyst: had MRI recently- now gone  Anemia  Gastric ulcer: Long time ago  Rheumatoid arthritis  Renal transplant recipient  Breakdown (mechanical) of penile (implanted) prosthesis, sequela  End-stage renal disease (ESRD): PERMACATH PLACEMENT  Abscess of left buttock: s/p I &amp; D  AV fistula: placement left lower arm  S/P cystoscopy: stent placement &amp; removal for kidney stones, lithiotripsy  s/p Lt Carpal tunnel: 2011      Vital Signs Last 24 Hrs  T(C): 37 (09 Jan 2020 07:00), Max: 37.7 (08 Jan 2020 19:00)  T(F): 98.6 (09 Jan 2020 07:00), Max: 99.9 (08 Jan 2020 19:00)  HR: 71 (09 Jan 2020 10:00) (62 - 98)  BP: 137/76 (09 Jan 2020 10:00) (110/64 - 179/97)  BP(mean): 101 (09 Jan 2020 10:00) (82 - 132)  RR: 14 (09 Jan 2020 10:00) (11 - 30)  SpO2: 100% (09 Jan 2020 10:00) (96% - 100%)    I&O's Summary    08 Jan 2020 07:01  -  09 Jan 2020 07:00  --------------------------------------------------------  IN: 39310 mL / OUT: 4030 mL / NET: 25479 mL    09 Jan 2020 07:01  -  09 Jan 2020 11:00  --------------------------------------------------------  IN: 24 mL / OUT: 3000 mL / NET: -2976 mL                              8.4    31.53 )-----------( 453      ( 09 Jan 2020 03:52 )             24.3     01-09    128<L>  |  88<L>  |  38<H>  ----------------------------<  71  5.8<H>   |  23  |  6.57<H>    Ca    8.6      09 Jan 2020 10:18  Phos  6.7     01-09  Mg     2.6     01-09    TPro  6.1  /  Alb  2.5<L>  /  TBili  0.8  /  DBili  0.4<H>  /  AST  16  /  ALT  <5<L>  /  AlkPhos  183<H>  01-09          Culture - Blood (collected 01-07-20 @ 13:23)  Source: .Blood Blood-Venous  Preliminary Report (01-08-20 @ 14:02):    No growth to date.    Culture - Blood (collected 01-07-20 @ 13:23)  Source: .Blood Blood-Venous  Preliminary Report (01-08-20 @ 14:02):    No growth to date.    Culture - Urine (collected 01-07-20 @ 00:18)  Source: .Urine Catheterized  Final Report (01-07-20 @ 21:22):    <10,000 CFU/mL Normal Urogenital Gillian    Culture - Body Fluid with Gram Stain (collected 01-06-20 @ 21:03)  Source: Abdominal Fl Abdominal Fluid  Gram Stain (01-06-20 @ 23:41):    No polymorphonuclear leukocytes seen    No organisms seen    by cytocentrifuge  Preliminary Report (01-07-20 @ 17:27):    No growth    Culture - Other (collected 01-05-20 @ 13:59)  Source: Skin from incision  Final Report (01-07-20 @ 11:15):    No growth at 48 hours    Culture - Urine (collected 01-05-20 @ 13:59)  Source: .Urine Clean Catch (Midstream)  Final Report (01-07-20 @ 20:09):    >100,000 CFU/ml Enterobacter cloacae  Organism: Enterobacter cloacae (01-07-20 @ 20:09)  Organism: Enterobacter cloacae (01-07-20 @ 20:09)    Culture - Blood (collected 01-04-20 @ 13:07)  Source: .Blood Blood  Preliminary Report (01-05-20 @ 14:01):    No growth to date.    Culture - Blood (collected 01-04-20 @ 13:07)  Source: .Blood Blood  Preliminary Report (01-05-20 @ 14:01):    No growth to date.      EXAM:  DUPLEX EXT VEINS LOWER RT                  PROCEDURE DATE:  01/08/2020    INTERPRETATION:  CLINICAL INFORMATION: Right lower extremity swelling. Patient is status post kidney transplant.    COMPARISON: A prior study, dated 1/4/2020, showed no DVT.    TECHNIQUE: Duplex sonography of the RIGHT LOWER extremity veins with color and spectral Doppler, with and without compression.      FINDINGS: There is an enlarged right inguinal lymph node.    There is acute, echogenic, partially occlusive DVT affecting the right external iliac vein and common femoral vein.    There is occlusive thrombus affecting the right femoral vein in the proximal thigh.    The right popliteal vein is patent and free of thrombus.    The right posterior tibial and peroneal veins are patent in the calf.    There is acute DVT affecting the right soleal vein in the calf.    The contralateral common femoral vein is patent.  IMPRESSION:   There is acute above-the-knee thrombus affecting the right external iliac, common femoral and femoral veins.  There is acute calf vein thrombus affecting the right soleal vein.        XAM:  US KIDNEY TRANSPLANT W DOPP RT             PROCEDURE DATE:  01/07/2020    INTERPRETATION:  CLINICAL INFORMATION: Hematuria with elevated creatinine to 7.4, status post renal transplant on 12/8/2019 complicated by peritransplant hematoma    COMPARISON: Renal ultrasound from 12/31/2019 and CT abdomen and pelvis from 1/4/2020    TECHNIQUE: Grayscale, Color and spectral Doppler evaluation of a RIGHT renal transplant.     FINDINGS:    Renal Transplant: 11.8 cm. Small amount of debris within the collecting system decreased compared to prior exam 12/31/2019 with redemonstration of pelvic fullness, also not significantly changed. Perinephric hematoma measures 9.6 4.0 x 7.0, not significantly changed in size since the prior exam although decreased in echogenicity most likely representing liquefaction.    Urinary bladder: Collapsed around a Kennedy catheter.    Color and spectral Doppler reveals homogeneous flow throughout the transplant.    Peak iliac artery velocity is 179 cm/sec pre-anastomosis, 277 cm/sec at the anastomosis, and 188 cm/sec post anastomosis.    Transplant Renal Artery:   Peak systolic velocity is 281 cm/sec at the anastomosis    At the superior branch: 193 cm/sec proximal, 114 cm/sec mid, 113 cm/sec distal and 32 cm/sec hilum.     At the inferior branch: 248 cm/sec proximal, 79 cm/sec mid, 52 cm/sec distal and 47 cm/sec hilum.     Resistive Indices Range: 0.8 - 0.9    Transplant Renal Vein: Patent..    IMPRESSION:     Perinephric hematoma not significantly changed in size with decreased echogenicity likely related to liquefaction.    Peak systolic velocity at the transplant renal artery anastomosis has decreased from 444 cm to 281 cm/s since 12/31/2019. Continued follow-up is recommended.          Culture - Urine (collected 01-07-20 @ 00:18)  Source: .Urine Catheterized  Final Report (01-07-20 @ 21:22):    <10,000 CFU/mL Normal Urogenital Gillian    Culture - Body Fluid with Gram Stain (collected 01-06-20 @ 21:03)  Source: Abdominal Fl Abdominal Fluid  Gram Stain (01-06-20 @ 23:41):    No polymorphonuclear leukocytes seen    No organisms seen    by cytocentrifuge  Preliminary Report (01-07-20 @ 17:27):    No growth    Culture - Other (collected 01-05-20 @ 13:59)  Source: Skin from incision  Final Report (01-07-20 @ 11:15):    No growth at 48 hours    Culture - Urine (collected 01-05-20 @ 13:59)  Source: .Urine Clean Catch (Midstream)  Final Report (01-07-20 @ 20:09):    >100,000 CFU/ml Enterobacter cloacae  Organism: Enterobacter cloacae (01-07-20 @ 20:09)  Organism: Enterobacter cloacae (01-07-20 @ 20:09)    Culture - Blood (collected 01-04-20 @ 13:07)  Source: .Blood Blood  Preliminary Report (01-05-20 @ 14:01):    No growth to date.    Culture - Blood (collected 01-04-20 @ 13:07)  Source: .Blood Blood  Preliminary Report (01-05-20 @ 14:01):    No growth to date.    Culture - Tissue with Gram Stain (collected 01-01-20 @ 17:12)  Source: .Tissue Other  Gram Stain (01-02-20 @ 00:25):    Rare polymorphonuclear leukocytes seen per low power field    Rare Gram Negative Rods seen per oil power field  Final Report (01-06-20 @ 07:47):    Numerous Enterobacter cloacae complex  Organism: Enterobacter cloacae complex (01-06-20 @ 07:47)  Organism: Enterobacter cloacae complex (01-06-20 @ 07:47)    Culture - Surgical Swab (collected 01-01-20 @ 17:06)  Source: .Surgical Swab collection around right kidney  Final Report (01-06-20 @ 07:45):    Moderate Enterobacter cloacae complex  Organism: Enterobacter cloacae complex (01-06-20 @ 15:11)  Organism: Enterobacter cloacae complex (01-06-20 @ 07:45)    Culture - Body Fluid with Gram Stain (collected 01-01-20 @ 16:59)  Source: .Body Fluid periphrenic collection  Gram Stain (01-02-20 @ 09:22):    Upon re-evaluation of gram stain: Rare Gram Negative Rods seen    No polymorphonuclear cells seen    by cytocentrifuge  Final Report (01-06-20 @ 13:03):    Moderate Escherichia coli    Moderate Enterobacter cloacae (Carbapenem Resistant)  Organism: Escherichia coli  Enterobacter cloacae (Carbapenem Resistant) (01-06-20 @ 08:44)  Organism: Enterobacter cloacae (Carbapenem Resistant) (01-06-20 @ 08:44)  Organism: Escherichia coli (01-06-20 @ 08:44)    Culture - Surgical Swab (collected 01-01-20 @ 16:51)  Source: .Surgical Swab right kidney abscess  Final Report (01-06-20 @ 07:45):    Moderate Enterobacter cloacae complex  Organism: Enterobacter cloacae complex (01-06-20 @ 07:45)  Organism: Enterobacter cloacae complex (01-06-20 @ 07:45)              REVIEW OF SYSTEMS  Gen: No weight changes, fatigue, fevers/chills, weakness  Skin: No rashes  Head/Eyes/Ears/Mouth: No headache; Normal hearing; Normal vision w/o blurriness; No sinus pain/discomfort, sore throat  Respiratory: No dyspnea, cough, wheezing, hemoptysis  CV: No chest pain, PND, orthopnea  GI: Denies abdominal pain at time of exam. No diarrhea, constipation, nausea, vomiting, melena, hematochezia  : No increased frequency, dysuria, hematuria, nocturia.  Reports resolution of bladder spasms  MSK: No joint pain/swelling; no back pain; no edema  Neuro: No dizziness/lightheadedness, weakness, seizures, numbness, tingling  Heme: No easy bruising or bleeding  Endo: No heat/cold intolerance  Psych: No significant nervousness, anxiety, stress, depression      PHYSICAL EXAM:    Constitutional: Well developed / well nourished  	Eyes: Anicteric, PERRLA  	ENMT: nc/at   	Neck: Supple  	Respiratory: CTA     	Cardiovascular: RRR  	Gastrointestinal: Soft abdomen, tender to palpation, ND. incision w/ serosanguinous drainage, BO w/ minimal drainage    Genitourinary:  Kennedy 3way in place (placed by urology) - CBI held    Extremities: SCD's in place and working bilaterally  	Vascular: doppler signal R DP and PT, faintly palpable L DP.  3+ pitting edema RLE, 1+LLE   	Neurological: A&O x3. no confusion noted on exam  	Skin: RLQ dressing intact with SS drainage noted. BO patent with scan output   	Musculoskeletal: Moving all extremities    Psychiatric: Responsive Transplant Surgery - Multidisciplinary Rounds  --------------------------------------------------------------  R DDRT    Date:  12/8/19  Dr. Espinal, readmitted 12/27/19 with +Flu/perinephretic collection  Ex Lap washout/ renal bx  Date:  1/1/20    Present:   Patient seen with multidisciplinary team including Transplant Surgeon: Dr. Cruz, Transplant Nephrologist: Dr. Puckett, Syed Montano and Counts include [facility] beds at the [facility] and examined with Dr. Cruz during am rounds. Disciplines not in attendance will be notified of the plan.      HPI: 49M PMHx of ESRD on HD  ( via LUE AVF), HTN, Gout, Glaucoma, NET of pancreas (s/p robotic distal panc/splenectomy at [place] 2015 by Dr. Young, followed by Dr. Raphael, [facility] Oncologist), RA with hand contractures. He underwent DDRT on 12/8/19 (ureteral stent sutured to Martinez - removed 12/15). Post op course c/b DGF requiring HD, thrombocytopenia, elevated LDH and Haptoglobulin. Schistocytes  on peripheral smear concerning for TMA. Envarsus held. Received Belatacept 12/13. Started on PLEX (12/12, 12/15). Underwent renal bx on 12/13 c/w profound ATN.  Found to have much improvement to levels, likely related to Tacrolimus. He was discharged home on 12/20/19 w/ outpatient HD.     Re-admitted 12/27 with influenza, completed treatement with Tamiflu on 12/31. Urine cx on admission grew Ent cloacae, was started on meropenem 12/31. Renal US on admission with increasing hematoma. OR on 1/1 for ex-lap, hematoma evacuation, washout and renal bx. (c/w ATN)  OR cxs grew CRE and e coli. Post op course c/b:   ·	fevers and leukocytosis, CT a/p (1/4) with increased perinephric collection. Underwent IR guided collection 1/6 (fluid cx neg)  ·	New onset hematuria, martinez inserted 1/7, started CBI  ·	RLE edema, RLE doppler (1/8) with acute above the knee thrombus of R external iliac, common femoral and femoral veins, Acute calf vein thrombus affecting R soleal vein, Heparin gtt initiated   ·	AMS 1/9  (head CT neg), hyponatremic  ·	Significant hematuria and bleeding around the martinez. Angio 1/9 showed actively bleeding pseudoaneurysm at the anastomosis of the transplant renal artery with the recipient iliac artery. A stent was placed in the iliac artery to control hemorrhage. IVC filter placed.   ·	Emergent OR 1/9-1/10 for graft nephrectomy;  ureter to bladder anastomosis was completely disrupted, repaired the R external iliac artery with bovine pericardial patch, repaired right external iliac vein, performed thrombectomy of RLE veins, Intra op received 3u PRBC.       cxs:   12/29: Urine Cx: Enterobacter Cloacae  1/1 OR Perinephric collection Cx:  Carbapenem resistant Ent Cloacae, E. Coli  1/1: R kidney abscess swab: Enterobacter Cloacae  1/1  OR tissue: Enterobacter Cloacae  1/5: Urine Cx: Enteropbacter Cloacae  1/5: Skin incision Cx (bedside): NG  1/6: Adominal fluid Cx from IR:  NG   1/7: Urine Cx:  NG  1/7: BCX2:  NG  1/10 retroperit hematoma - enerobacter (pending)  1/10 bladder hematoma - enterobacter (pending)      Interval events:    - extubated this am; off pressors  - H/H stable, received 1u PRBC yesterday  - Vascular following: recommend re-starting AC/no bolus  - ID following: on zora/cipro/vanco/fluc; OR cxs from 11/10 pending   - events leading to emergent OR and graft nephrectomy discussed with pt at length     Potential Discharge date: pending clinical improvement      MEDICATIONS  (STANDING):  brimonidine 0.2% Ophthalmic Solution 1 Drop(s) Both EYES three times a day  chlorhexidine 2% Cloths 1 Application(s) Topical <User Schedule>  ciprofloxacin   IVPB 400 milliGRAM(s) IV Intermittent every 24 hours  dextrose 5% + sodium chloride 0.9%. 1000 milliLiter(s) (30 mL/Hr) IV Continuous <Continuous>  fluconAZOLE IVPB 400 milliGRAM(s) IV Intermittent every 24 hours  latanoprost 0.005% Ophthalmic Solution 1 Drop(s) Both EYES daily  meropenem  IVPB 500 milliGRAM(s) IV Intermittent every 24 hours  methylPREDNISolone sodium succinate Injectable 4 milliGRAM(s) IV Push daily  pantoprazole  Injectable 40 milliGRAM(s) IV Push daily    MEDICATIONS  (PRN):  HYDROmorphone  Injectable 0.5 milliGRAM(s) IV Push every 2 hours PRN Moderate Pain (4 - 6)  lidocaine 2% Jelly 1 milliLiter(s) IntraUrethral two times a day PRN irritation      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  Carpal tunnel syndrome  Brain cyst: had MRI recently- now gone  Anemia  Gastric ulcer: Long time ago  Rheumatoid arthritis  Renal transplant recipient  Breakdown (mechanical) of penile (implanted) prosthesis, sequela  End-stage renal disease (ESRD): PERMACATH PLACEMENT  Abscess of left buttock: s/p I &amp; D  AV fistula: placement left lower arm  S/P cystoscopy: stent placement &amp; removal for kidney stones, lithiotripsy  s/p Lt Carpal tunnel: 2011      Vital Signs Last 24 Hrs  T(C): 36.1 (11 Jan 2020 07:00), Max: 36.3 (10 [place] 2020 20:00)  T(F): 97 (11 Jan 2020 07:00), Max: 97.3 (10 [place] 2020 20:00)  HR: 75 (11 Jan 2020 10:00) (68 - 79)  BP: 134/62 (11 Jan 2020 07:00) (134/62 - 134/62)  BP(mean): 89 (11 Jan 2020 07:00) (89 - 89)  RR: 16 (11 Jan 2020 10:00) (12 - 17)  SpO2: 100% (11 Jan 2020 10:00) (100% - 100%)    I&O's Summary    10 [place] 2020 07:01  -  11 Jan 2020 07:00  --------------------------------------------------------  IN: 2225.5 mL / OUT: 5 mL / NET: 2220.5 mL    11 Jan 2020 07:01  -  11 Jan 2020 12:23  --------------------------------------------------------  IN: 103.4 mL / OUT: 0 mL / NET: 103.4 mL                              8.7    18.21 )-----------( 313      ( 11 Jan 2020 11:28 )             25.4     01-11    129<L>  |  99  |  29<H>  ----------------------------<  109<H>  4.9   |  19<L>  |  5.58<H>    Ca    8.0<L>      11 Jan 2020 11:28  Phos  6.3     01-11  Mg     2.5     01-11    TPro  4.0<L>  /  Alb  1.7<L>  /  TBili  0.8  /  DBili  0.6<H>  /  AST  8<L>  /  ALT  5<L>  /  AlkPhos  83  01-11        Culture - Tissue with Gram Stain (collected 01-10-20 @ 06:07)  Source: .Tissue 1. retroperitoneal hematoma  Gram Stain (01-10-20 @ 13:29):    Few polymorphonuclear leukocytes seen per low power field    No organisms seen per oil power field  Preliminary Report (01-11-20 @ 11:15):    Rare Enterobacter cloacae complex    Culture - Tissue with Gram Stain (collected 01-10-20 @ 06:07)  Source: .Tissue 5. bladder hematoma  Gram Stain (01-10-20 @ 13:35):    No polymorphonuclear leukocytes seen per low power field    No organisms seen per oil power field  Preliminary Report (01-11-20 @ 11:20):    Rare Enterobacter cloacae complex    Culture - Tissue with Gram Stain (collected 01-10-20 @ 06:07)  Source: .Tissue Other  Gram Stain (01-10-20 @ 13:33):    Moderate polymorphonuclear leukocytes seen per low power field    No organisms seen per oil power field  Preliminary Report (01-11-20 @ 08:00):    No growth    Culture - Tissue with Gram Stain (collected 01-10-20 @ 06:07)  Source: .Tissue 3. right veinal artery  Gram Stain (01-10-20 @ 13:29):    Numerous polymorphonuclear leukocytes seen per low power field    No organisms seen per oil power field  Preliminary Report (01-11-20 @ 09:15):    No growth    Culture - Tissue with Gram Stain (collected 01-10-20 @ 06:07)  Source: .Tissue Other  Gram Stain (01-10-20 @ 15:41):    No polymorphonuclear cells seen per low power field    No organisms seen per oil power field  Preliminary Report (01-11-20 @ 08:02):    No growth    Culture - Blood (collected 01-07-20 @ 13:23)  Source: .Blood Blood-Venous  Preliminary Report (01-08-20 @ 14:02):    No growth to date.    Culture - Blood (collected 01-07-20 @ 13:23)  Source: .Blood Blood-Venous  Preliminary Report (01-08-20 @ 14:02):    No growth to date.    Culture - Urine (collected 01-07-20 @ 00:18)  Source: .Urine Catheterized  Final Report (01-07-20 @ 21:22):    <10,000 CFU/mL Normal Urogenital Gillian    Culture - Body Fluid with Gram Stain (collected 01-06-20 @ 21:03)  Source: Abdominal Fl Abdominal Fluid  Gram Stain (01-06-20 @ 23:41):    No polymorphonuclear leukocytes seen    No organisms seen    by cytocentrifuge  Preliminary Report (01-07-20 @ 17:27):    No growth    Culture - Other (collected 01-05-20 @ 13:59)  Source: Skin from incision  Final Report (01-07-20 @ 11:15):    No growth at 48 hours    Culture - Urine (collected 01-05-20 @ 13:59)  Source: .Urine Clean Catch (Midstream)  Final Report (01-07-20 @ 20:09):    >100,000 CFU/ml Enterobacter cloacae  Organism: Enterobacter cloacae (01-07-20 @ 20:09)  Organism: Enterobacter cloacae (01-07-20 @ 20:09)    Culture - Blood (collected 01-04-20 @ 13:07)  Source: .Blood Blood  Final Report (01-09-20 @ 14:01):    No growth at 5 days.    Culture - Blood (collected 01-04-20 @ 13:07)  Source: .Blood Blood  Final Report (01-09-20 @ 14:01):    No growth at 5 days.      REVIEW OF SYSTEMS  Gen: No weight changes, fatigue, fevers/chills, weakness  Skin: No rashes  Head/Eyes/Ears/Mouth: No headache; Normal hearing; Normal vision w/o blurriness; No sinus pain/discomfort, sore throat  Respiratory: No dyspnea, cough, wheezing, hemoptysis  CV: No chest pain, PND, orthopnea  GI: Denies abdominal pain at time of exam. No diarrhea, constipation, nausea, vomiting, melena, hematochezia  : No increased frequency, dysuria, hematuria, nocturia.  Reports resolution of bladder spasms  MSK: No joint pain/swelling; no back pain; no edema  Neuro: No dizziness/lightheadedness, weakness, seizures, numbness, tingling  Heme: No easy bruising or bleeding  Endo: No heat/cold intolerance  Psych: No significant nervousness, anxiety, stress, depression      PHYSICAL EXAM:    Constitutional: NAD  	Eyes: Anicteric, PERRLA  	ENMT: nc/at   	Neck: Supple  	Respiratory: CTA     	Cardiovascular: RRR  	Gastrointestinal: Soft abdomen, tender to palpation, ND. incision w/ serosanguinous drainage, BO w/ minimal drainage    Genitourinary:  Martinez    Extremities: RLE edema   	Vascular: doppler signal R DP and PT, faintly palpable L DP.  3+ pitting edema RLE, 1+LLE   	Neurological: A&O x3. no confusion noted on exam  	Skin: RLQ wound vac  	Musculoskeletal: Moving all extremities    Psychiatric: Responsive

## 2020-01-11 NOTE — PROGRESS NOTE ADULT - ASSESSMENT
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 Young, followed by Dr. Raphael, [facility] Oncologist), RA with hand contractures. 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 Post op course c/b DGF requiring HD, thrombocytopenia, elevated LDH and Haptoglobulin. Schistocytes  on peripheral smear concerning for TMA. Envarsus held. Received Belatacept 12/13. Started on PLEX (12/12, 12/15). Underwent renal bx on 12/13 c/w profound ATN. Re-admitted with Influenza,  s/p hematoma evacuation/ abd washout and renal biopsy now with leukocytosis and fevers and acute DVT of R external iliac, common femoral, femoral veins, R soleal vein requiring heparin drip.       DDRT   - DDRT 12/18/19 c/b DGF, TMA s/p PLEX and biopsy X2 proven ATN  - Enterobacter Cloacae UTI and infected peripnephric hematoma s/p washout, and IR drainage. Currently on Meropenem and Fluconazole with WBC 31,000  - New acute DVT of R external iliac, common femoral, femoral veins, R soleal vein requiring heparin drip  - Acute bleeding from urethral meatus on CBI. Stable H/H.   Stop CBI today  - Hold HD pending ultrasound today  - Urgent US to evaluate  renal graft flow  - Hyponatremia: Start 2% NS at 50ml/hour. BMP q4 hours to evaluate serum Na  - Immuno: Continue to Hold Belatacept for now (last dose 12/28).   Pred 5  - MMF on hold 2/2 infection  - PPx: bactrim/valcyte TIW  - Fluconazole 200 mg QD. Nystatin stopped   - Strict I/Os  - Renal diet  - Bowel regimen  - SCDs , IS, encourage ambulation       Enterobacter cloacae UTI/Infected Hematoma  - Continue Meropenem and Fluconazole  - Add Ciprofloxacin as per ID recommendations  - S/P Amikacin x2 (1/7 given pre-HD, dose repeated 1/8)  - Daily CBC, procalcitonin  - Follow cultures    HTN,  BP   - hold  hydralazine    - Cardiology consulted for episode of wide complex tachycardia.  No further episodes.  Recommending Metoprolol 12.5ng BID when appropriate and ECHO      Influenza  - RVP + influenza A  - Completed Tamiflu 49M PMHx of ESRD on HD  ( via LUE AVF), HTN, Gout, Glaucoma, NET of pancreas, RA with hand contractures. s/p DDRT 12/8/19,  Post op course c/b DGF requiring HD. Renal bx x 2 c/w profound ATN.     Re-admitted with Influenza and perinephric hematoma. s/p hematoma evacuation/ abd washout and renal biopsy. Post op course c/b leukocytosis, infected hematoma, acute RLE DVT, and bleeding pseudoaneurysm at txp renal artery anastomosis. Now s/p graft nephrectomy.     [] DDRT c/b DGF/ATN/perinephric infected hematoma/pseudoneurysm/graft nephrectomy   - off immunosupression, cont pred for now  - discontinued valcyte/bactrim  - HD per nephrology; scheduled for today  - ID following: cont zora/cipro/fluc. DC vanco; f/u OR cx from 1/10 (prelim growing enterobacter), received amikacin 1/7 and 1/8   - advance diet as  tolerated  - pain control  - bowel regimen  - Wound vac change (Monday)    [] New acute DVT of R external iliac, common femoral, femoral veins, R soleal vein requiring heparin drip  - IVC filter placed 1/9  - Vascular following: recommend re-starting AC/Heprin, no bolus

## 2020-01-11 NOTE — PROGRESS NOTE ADULT - SUBJECTIVE AND OBJECTIVE BOX
Follow Up:  Infected Perinephric Hematoma    Interval History:  extubated. No chills or fevers. continued pain around transplant kidney. no diarrhea.     REVIEW OF SYSTEMS  [  ] ROS unobtainable because:    [  ] All other systems negative except as noted below    Constitutional:  [ ] fever [ ] chills  [ ] weight loss  [ ] weakness  Skin:  [ ] rash [ ] phlebitis	  Eyes: [ ] icterus [ ] pain  [ ] discharge	  ENMT: [ ] sore throat  [ ] thrush [ ] ulcers [ ] exudates  Respiratory: [ ] dyspnea [ ] hemoptysis [ ] cough [ ] sputum	  Cardiovascular:  [ ] chest pain [ ] palpitations [ ] edema	  Gastrointestinal:  [ ] nausea [ ] vomiting [ ] diarrhea [ ] constipation [ x] pain	  Genitourinary:  [ ] dysuria [ ] frequency [ ] hematuria [ ] discharge [ ] flank pain  [ ] incontinence  Musculoskeletal:  [ ] myalgias [ ] arthralgias [ ] arthritis  [ ] back pain  Neurological:  [ ] headache [ ] seizures  [ ] confusion/altered mental status    Allergies  coconut (Anaphylaxis)  morphine (Pruritus; Rash)        ANTIMICROBIALS:  ciprofloxacin   IVPB 400 every 24 hours  fluconAZOLE IVPB 400 every 24 hours  meropenem  IVPB 500 every 24 hours      OTHER MEDS:  MEDICATIONS  (STANDING):  heparin  Infusion 900 <Continuous>  HYDROmorphone  Injectable 0.5 every 2 hours PRN  methylPREDNISolone sodium succinate Injectable 4 daily  pantoprazole  Injectable 40 daily      Vital Signs Last 24 Hrs  T(C): 36.7 (11 Jan 2020 12:30), Max: 36.7 (11 Jan 2020 12:30)  T(F): 98 (11 Jan 2020 12:30), Max: 98 (11 Jan 2020 12:30)  HR: 79 (11 Jan 2020 12:30) (69 - 79)  BP: 134/62 (11 Jan 2020 07:00) (134/62 - 134/62)  BP(mean): 89 (11 Jan 2020 07:00) (89 - 89)  RR: 14 (11 Jan 2020 12:30) (12 - 17)  SpO2: 100% (11 Jan 2020 12:30) (100% - 100%)    PHYSICAL EXAMINATION:  General: Alert and Awake, NAD  HEENT: PERRL, EOMI  Neck: Supple  Cardiac: RRR, No M/R/G  Resp: CTAB, No Wh/Rh/Ra  Abdomen: +RLQ wound vac over transplant nephrectomy site, NBS, tenderness to palpation over RLQ, No HSM, No rigidity or guarding  MSK: No LE edema. No Calf tenderness  : No martinez  Skin: No rashes or lesions. Skin is warm and dry to the touch.   Neuro: Alert and Awake. CN 2-12 Grossly intact. Moves all four extremities spontaneously.  Psych: Calm, Pleasant, Cooperative                          8.7    18.21 )-----------( 313      ( 11 Jan 2020 11:28 )             25.4       01-11    129<L>  |  99  |  29<H>  ----------------------------<  109<H>  4.9   |  19<L>  |  5.58<H>    Ca    8.0<L>      11 Jan 2020 11:28  Phos  6.3     01-11  Mg     2.5     01-11    TPro  4.0<L>  /  Alb  1.7<L>  /  TBili  0.8  /  DBili  0.6<H>  /  AST  8<L>  /  ALT  5<L>  /  AlkPhos  83  01-11      MICROBIOLOGY:    Vancomycin Level, Random: 25.1 ug/mL (01-11-20 @ 04:59)    .Tissue Other  01-10-20   No growth  --    No polymorphonuclear cells seen per low power field  No organisms seen per oil power field      .Blood Blood-Venous  01-07-20   No growth to date.  --  --      .Urine Catheterized  01-07-20   <10,000 CFU/mL Normal Urogenital Gillian  --  --      Abdominal Fl Abdominal Fluid  01-06-20   No growth  --    No polymorphonuclear leukocytes seen  No organisms seen  by cytocentrifuge      .Urine Clean Catch (Midstream)  01-05-20   >100,000 CFU/ml Enterobacter cloacae  --  Enterobacter cloacae      .Blood Blood  01-04-20   No growth at 5 days.  --  --      .Tissue Other  01-01-20   Numerous Enterobacter cloacae complex  --  Enterobacter cloacae complex      .Surgical Swab collection around right kidney  01-01-20   Moderate Enterobacter cloacae complex  --  Enterobacter cloacae complex      .Body Fluid periphrenic collection  01-01-20   **Please Note**: This is a Corrected Report**  Moderate Enterobacter cloacae complex  Moderate Escherichia coli  Previously reported as:  Moderate Enterobacter cloacae (Carbapenem Resistant)  Moderate Escherichia coli  --  Escherichia coli  Enterobacter cloacae complex      .Surgical Swab right kidney abscess  01-01-20   Moderate Enterobacter cloacae complex  --  Enterobacter cloacae complex      .Urine Clean Catch (Midstream)  12-29-19   >100,000 CFU/ml Enterobacter cloacae  --  Enterobacter cloacae      .Blood Blood-Peripheral  12-27-19   No growth at 5 days.  --  --      .Stool Feces  12-14-19   No enteric pathogens isolated.  (Stool culture examined for Salmonella,  Shigella, Campylobacter, Aeromonas, Plesiomonas,  Vibrio, E.coli O157 and Yersinia)  --  --      .Stool moriah johnie  12-14-19   GI PCR Results: NOT detected  *******Please Note:*******  GI panel PCR evaluates for:  Campylobacter, Plesiomonas shigelloides, Salmonella,  Vibrio, Yersinia enterocolitica, Enteroaggregative  Escherichia coli (EAEC), Enteropathogenic E.coli (EPEC),  Enterotoxigenic E. coli (ETEC) lt/st, Shiga-like  toxin-producing E. coli (STEC) stx1/stx2,  Shigella/ Enteroinvasive E. coli (EIEC), Cryptosporidium,  Cyclospora cayetanensis, Entamoeba histolytica,  Giardia lamblia, Adenovirus F 40/41, Astrovirus,  Norovirus GI/GII, Rotavirus A, Sapovirus  --  --    RADIOLOGY:    EXAM:  XR CHEST PORTABLE ROUTINE 1V                        PROCEDURE DATE:  01/11/2020    Right basilar subsegmental atelectasis.

## 2020-01-11 NOTE — PROGRESS NOTE ADULT - SUBJECTIVE AND OBJECTIVE BOX
Patient  is more  alert  today following  commands  still intubated  So far  cultures  negative  but  still pending  fungal  cultures  Right  leg edema  present  but  with  palpable  DP  foot  is  warm Motor  sensory intact  Suggest  starting  IV heparin  without  a bolus  later  today  aiming  for   60 to 70 PTT  . I spoke  to 8ICU  staff  Patient  has  extensive  DVT  right iliac  and  Femoral veins

## 2020-01-12 LAB
ALBUMIN SERPL ELPH-MCNC: 2.2 G/DL — LOW (ref 3.3–5)
ALP SERPL-CCNC: 95 U/L — SIGNIFICANT CHANGE UP (ref 40–120)
ALT FLD-CCNC: <5 U/L — LOW (ref 10–45)
ANION GAP SERPL CALC-SCNC: 17 MMOL/L — SIGNIFICANT CHANGE UP (ref 5–17)
APTT BLD: 58.9 SEC — HIGH (ref 27.5–36.3)
APTT BLD: 78.2 SEC — HIGH (ref 27.5–36.3)
APTT BLD: 91 SEC — HIGH (ref 27.5–36.3)
APTT BLD: 97 SEC — HIGH (ref 27.5–36.3)
AST SERPL-CCNC: 12 U/L — SIGNIFICANT CHANGE UP (ref 10–40)
BILIRUB DIRECT SERPL-MCNC: 0.6 MG/DL — HIGH (ref 0–0.2)
BILIRUB INDIRECT FLD-MCNC: 0.5 MG/DL — SIGNIFICANT CHANGE UP (ref 0.2–1)
BILIRUB SERPL-MCNC: 1.1 MG/DL — SIGNIFICANT CHANGE UP (ref 0.2–1.2)
BUN SERPL-MCNC: 18 MG/DL — SIGNIFICANT CHANGE UP (ref 7–23)
CALCIUM SERPL-MCNC: 8.1 MG/DL — LOW (ref 8.4–10.5)
CHLORIDE SERPL-SCNC: 99 MMOL/L — SIGNIFICANT CHANGE UP (ref 96–108)
CO2 SERPL-SCNC: 23 MMOL/L — SIGNIFICANT CHANGE UP (ref 22–31)
CREAT SERPL-MCNC: 4.15 MG/DL — HIGH (ref 0.5–1.3)
CULTURE RESULTS: SIGNIFICANT CHANGE UP
CULTURE RESULTS: SIGNIFICANT CHANGE UP
GLUCOSE SERPL-MCNC: 84 MG/DL — SIGNIFICANT CHANGE UP (ref 70–99)
HCT VFR BLD CALC: 21.6 % — LOW (ref 39–50)
HCT VFR BLD CALC: 23.6 % — LOW (ref 39–50)
HGB BLD-MCNC: 7.3 G/DL — LOW (ref 13–17)
HGB BLD-MCNC: 8.2 G/DL — LOW (ref 13–17)
INR BLD: 1.05 RATIO — SIGNIFICANT CHANGE UP (ref 0.88–1.16)
MAGNESIUM SERPL-MCNC: 2.2 MG/DL — SIGNIFICANT CHANGE UP (ref 1.6–2.6)
MCHC RBC-ENTMCNC: 29.8 PG — SIGNIFICANT CHANGE UP (ref 27–34)
MCHC RBC-ENTMCNC: 30 PG — SIGNIFICANT CHANGE UP (ref 27–34)
MCHC RBC-ENTMCNC: 33.8 GM/DL — SIGNIFICANT CHANGE UP (ref 32–36)
MCHC RBC-ENTMCNC: 34.7 GM/DL — SIGNIFICANT CHANGE UP (ref 32–36)
MCV RBC AUTO: 85.8 FL — SIGNIFICANT CHANGE UP (ref 80–100)
MCV RBC AUTO: 88.9 FL — SIGNIFICANT CHANGE UP (ref 80–100)
NRBC # BLD: 0 /100 WBCS — SIGNIFICANT CHANGE UP (ref 0–0)
NRBC # BLD: 0 /100 WBCS — SIGNIFICANT CHANGE UP (ref 0–0)
PHOSPHATE SERPL-MCNC: 4.2 MG/DL — SIGNIFICANT CHANGE UP (ref 2.5–4.5)
PLATELET # BLD AUTO: 367 K/UL — SIGNIFICANT CHANGE UP (ref 150–400)
PLATELET # BLD AUTO: 391 K/UL — SIGNIFICANT CHANGE UP (ref 150–400)
POTASSIUM SERPL-MCNC: 4 MMOL/L — SIGNIFICANT CHANGE UP (ref 3.5–5.3)
POTASSIUM SERPL-SCNC: 4 MMOL/L — SIGNIFICANT CHANGE UP (ref 3.5–5.3)
PROT SERPL-MCNC: 4.9 G/DL — LOW (ref 6–8.3)
PROTHROM AB SERPL-ACNC: 12 SEC — SIGNIFICANT CHANGE UP (ref 10–12.9)
RBC # BLD: 2.43 M/UL — LOW (ref 4.2–5.8)
RBC # BLD: 2.75 M/UL — LOW (ref 4.2–5.8)
RBC # FLD: 15.9 % — HIGH (ref 10.3–14.5)
RBC # FLD: 16.4 % — HIGH (ref 10.3–14.5)
SODIUM SERPL-SCNC: 139 MMOL/L — SIGNIFICANT CHANGE UP (ref 135–145)
SPECIMEN SOURCE: SIGNIFICANT CHANGE UP
SPECIMEN SOURCE: SIGNIFICANT CHANGE UP
WBC # BLD: 14.6 K/UL — HIGH (ref 3.8–10.5)
WBC # BLD: 20.2 K/UL — HIGH (ref 3.8–10.5)
WBC # FLD AUTO: 14.6 K/UL — HIGH (ref 3.8–10.5)
WBC # FLD AUTO: 20.2 K/UL — HIGH (ref 3.8–10.5)

## 2020-01-12 PROCEDURE — 71045 X-RAY EXAM CHEST 1 VIEW: CPT | Mod: 26

## 2020-01-12 PROCEDURE — 99291 CRITICAL CARE FIRST HOUR: CPT

## 2020-01-12 PROCEDURE — 99233 SBSQ HOSP IP/OBS HIGH 50: CPT

## 2020-01-12 PROCEDURE — 99232 SBSQ HOSP IP/OBS MODERATE 35: CPT

## 2020-01-12 RX ORDER — HEPARIN SODIUM 5000 [USP'U]/ML
950 INJECTION INTRAVENOUS; SUBCUTANEOUS
Qty: 25000 | Refills: 0 | Status: DISCONTINUED | OUTPATIENT
Start: 2020-01-12 | End: 2020-01-13

## 2020-01-12 RX ORDER — ONDANSETRON 8 MG/1
4 TABLET, FILM COATED ORAL ONCE
Refills: 0 | Status: COMPLETED | OUTPATIENT
Start: 2020-01-12 | End: 2020-01-12

## 2020-01-12 RX ORDER — HYDRALAZINE HCL 50 MG
10 TABLET ORAL ONCE
Refills: 0 | Status: DISCONTINUED | OUTPATIENT
Start: 2020-01-12 | End: 2020-01-13

## 2020-01-12 RX ADMIN — HEPARIN SODIUM 9.5 UNIT(S)/HR: 5000 INJECTION INTRAVENOUS; SUBCUTANEOUS at 17:09

## 2020-01-12 RX ADMIN — ONDANSETRON 4 MILLIGRAM(S): 8 TABLET, FILM COATED ORAL at 03:07

## 2020-01-12 RX ADMIN — HEPARIN SODIUM 10 UNIT(S)/HR: 5000 INJECTION INTRAVENOUS; SUBCUTANEOUS at 07:41

## 2020-01-12 RX ADMIN — HYDROMORPHONE HYDROCHLORIDE 0.5 MILLIGRAM(S): 2 INJECTION INTRAMUSCULAR; INTRAVENOUS; SUBCUTANEOUS at 21:01

## 2020-01-12 RX ADMIN — Medication 200 MILLIGRAM(S): at 09:40

## 2020-01-12 RX ADMIN — MEROPENEM 100 MILLIGRAM(S): 1 INJECTION INTRAVENOUS at 21:01

## 2020-01-12 RX ADMIN — SODIUM CHLORIDE 30 MILLILITER(S): 9 INJECTION, SOLUTION INTRAVENOUS at 07:41

## 2020-01-12 RX ADMIN — HYDROMORPHONE HYDROCHLORIDE 0.5 MILLIGRAM(S): 2 INJECTION INTRAMUSCULAR; INTRAVENOUS; SUBCUTANEOUS at 07:39

## 2020-01-12 RX ADMIN — CHLORHEXIDINE GLUCONATE 1 APPLICATION(S): 213 SOLUTION TOPICAL at 06:22

## 2020-01-12 RX ADMIN — HYDROMORPHONE HYDROCHLORIDE 0.5 MILLIGRAM(S): 2 INJECTION INTRAMUSCULAR; INTRAVENOUS; SUBCUTANEOUS at 03:22

## 2020-01-12 RX ADMIN — HYDROMORPHONE HYDROCHLORIDE 0.5 MILLIGRAM(S): 2 INJECTION INTRAMUSCULAR; INTRAVENOUS; SUBCUTANEOUS at 03:07

## 2020-01-12 RX ADMIN — HYDROMORPHONE HYDROCHLORIDE 0.5 MILLIGRAM(S): 2 INJECTION INTRAMUSCULAR; INTRAVENOUS; SUBCUTANEOUS at 21:15

## 2020-01-12 RX ADMIN — BRIMONIDINE TARTRATE 1 DROP(S): 2 SOLUTION/ DROPS OPHTHALMIC at 06:22

## 2020-01-12 RX ADMIN — Medication 4 MILLIGRAM(S): at 06:22

## 2020-01-12 RX ADMIN — BRIMONIDINE TARTRATE 1 DROP(S): 2 SOLUTION/ DROPS OPHTHALMIC at 21:01

## 2020-01-12 RX ADMIN — BRIMONIDINE TARTRATE 1 DROP(S): 2 SOLUTION/ DROPS OPHTHALMIC at 15:09

## 2020-01-12 RX ADMIN — FLUCONAZOLE 100 MILLIGRAM(S): 150 TABLET ORAL at 06:36

## 2020-01-12 RX ADMIN — SODIUM CHLORIDE 30 MILLILITER(S): 9 INJECTION, SOLUTION INTRAVENOUS at 03:08

## 2020-01-12 RX ADMIN — HYDROMORPHONE HYDROCHLORIDE 0.5 MILLIGRAM(S): 2 INJECTION INTRAMUSCULAR; INTRAVENOUS; SUBCUTANEOUS at 07:59

## 2020-01-12 RX ADMIN — LATANOPROST 1 DROP(S): 0.05 SOLUTION/ DROPS OPHTHALMIC; TOPICAL at 12:06

## 2020-01-12 NOTE — PROGRESS NOTE ADULT - SUBJECTIVE AND OBJECTIVE BOX
[facility] DIVISION OF KIDNEY DISEASES AND HYPERTENSION -- FOLLOW UP NOTE  --------------------------------------------------------------------------------  Chief Complaint:  sepsis, anemia now graft nephrectomy    24 hour events/subjective:  Pt still with some incisional pain.  Right leg very swollen.        PAST HISTORY  --------------------------------------------------------------------------------  No significant changes to PMH, PSH, FHx, SHx, unless otherwise noted    ALLERGIES & MEDICATIONS  --------------------------------------------------------------------------------  Allergies    coconut (Anaphylaxis)  morphine (Pruritus; Rash)    Intolerances      Standing Inpatient Medications  brimonidine 0.2% Ophthalmic Solution 1 Drop(s) Both EYES three times a day  chlorhexidine 2% Cloths 1 Application(s) Topical <User Schedule>  ciprofloxacin   IVPB 400 milliGRAM(s) IV Intermittent every 24 hours  fluconAZOLE IVPB 400 milliGRAM(s) IV Intermittent every 24 hours  heparin  Infusion 900 Unit(s)/Hr IV Continuous <Continuous>  latanoprost 0.005% Ophthalmic Solution 1 Drop(s) Both EYES daily  meropenem  IVPB 500 milliGRAM(s) IV Intermittent every 24 hours  methylPREDNISolone sodium succinate Injectable 4 milliGRAM(s) IV Push daily  polyethylene glycol 3350 17 Gram(s) Oral daily    PRN Inpatient Medications  bisacodyl Suppository 10 milliGRAM(s) Rectal daily PRN  HYDROmorphone  Injectable 0.5 milliGRAM(s) IV Push every 2 hours PRN  traMADol 50 milliGRAM(s) Oral every 12 hours PRN      REVIEW OF SYSTEMS  --------------------------------------------------------------------------------  Gen: weak  Skin: No rashes  Head/Eyes/Ears/Mouth: No headache;No sore throat  Respiratory: No dyspnea, cough,   CV: No chest pain, PND, orthopnea  GI: No abdominal pain, diarrhea, constipation, nausea, vomiting  Transplant: No pain  : No increased frequency, dysuria, hematuria, nocturia  MSK: right leg very swollen   Neuro: No dizziness/lightheadedness, weakness, seizures, numbness, tingling  Psych: No significant nervousness, anxiety, stress, depression    All other systems were reviewed and are negative, except as noted.    VITALS/PHYSICAL EXAM  --------------------------------------------------------------------------------  T(C): 37.4 (01-12-20 @ 11:00), Max: 37.5 (01-12-20 @ 07:00)  HR: 69 (01-12-20 @ 11:00) (69 - 85)  BP: 148/69 (01-12-20 @ 07:00) (148/69 - 148/69)  RR: 18 (01-12-20 @ 11:00) (10 - 27)  SpO2: 100% (01-12-20 @ 11:00) (96% - 100%)  Wt(kg): --        01-11-20 @ 07:01  -  01-12-20 @ 07:00  --------------------------------------------------------  IN: 1303.9 mL / OUT: 3180 mL / NET: -1876.1 mL    01-12-20 @ 07:01  -  01-12-20 @ 11:21  --------------------------------------------------------  IN: 300 mL / OUT: 0 mL / NET: 300 mL      Physical Exam:  	Gen: NAD  	HEENT: PERRL, supple neck, clear oropharynx  	Pulm: CTA B/L  	CV: RRR, S1S2; no rub  	Back: No spinal or CVA tenderness; no sacral edema  	Abd: +BS, soft, RLQ with wound vac in place                       Transplant: No tenderness, swelling  	: No suprapubic tenderness  	UE: Warm, FROM; no edema; no asterixis, arthritis of fingers  	LE: right leg 2+ pitting edema  	Neuro: No focal deficits  	Psych: Normal affect and mood  	Skin: Warm, without rashes      LABS/STUDIES  --------------------------------------------------------------------------------              8.2    20.20 >-----------<  367      [01-12-20 @ 03:09]              23.6     139  |  99  |  18  ----------------------------<  84      [01-12-20 @ 03:09]  4.0   |  23  |  4.15        Ca     8.1     [01-12-20 @ 03:09]      Mg     2.2     [01-12-20 @ 03:09]      Phos  4.2     [01-12-20 @ 03:09]    TPro  4.9  /  Alb  2.2  /  TBili  1.1  /  DBili  0.6  /  AST  12  /  ALT  <5  /  AlkPhos  95  [01-12-20 @ 03:09]    PT/INR: PT 12.0 , INR 1.05       [01-12-20 @ 03:09]  PTT: 78.2       [01-12-20 @ 08:57]      Creatinine Trend:  SCr 4.15 [01-12 @ 03:09]  SCr 3.21 [01-11 @ 17:23]  SCr 5.58 [01-11 @ 11:28]  SCr 5.36 [01-11 @ 04:59]  SCr 5.05 [01-10 @ 22:35]              Urinalysis - [12-29-19 @ 03:40]      Color Dark Brown / Appearance Turbid / SG 1.021 / pH 7.5      Gluc Negative / Ketone Trace  / Bili Small / Urobili <2 mg/dL       Blood Large / Protein 100 mg/dL / Leuk Est Large / Nitrite Negative      RBC >720 / WBC >720 / Hyaline 5 / Gran  / Sq Epi  / Non Sq Epi 8 / Bacteria Negative    Urine Creatinine <2      [01-09-20 @ 10:18]  Urine Sodium 155      [01-09-20 @ 10:18]  Urine Osmolality 286      [01-09-20 @ 10:18]    HbA1c 5.0      [12-11-19 @ 08:21]    HBsAb 351.0      [01-11-20 @ 16:09]  HBsAg Nonreact      [01-11-20 @ 16:09]  HBcAb Nonreact      [01-11-20 @ 16:09]  HCV 0.09, Nonreact      [01-11-20 @ 16:09]

## 2020-01-12 NOTE — PROGRESS NOTE ADULT - SUBJECTIVE AND OBJECTIVE BOX
Extubated   alert  oriented  following  commands   Right  leg  and  foot  motor  sensory intact  Edema  worse   On IV heparin  therapeutic   Concerning  is the  WBC  elevation  to  20 000.  Cultures  negative  so far  .  If  continues  to mount  WBC   need  a CT  scan  with  contrast  as well as  echo  to rule  out  endocarditis

## 2020-01-12 NOTE — PROGRESS NOTE ADULT - SUBJECTIVE AND OBJECTIVE BOX
Transplant Surgery - Multidisciplinary Rounds  --------------------------------------------------------------  R DDRT    Date:  12/8/19  Dr. Espinal, readmitted 12/27/19 with +Flu/perinephretic collection  Ex Lap washout/ renal bx  Date:  1/1/20    Present:   Patient seen and examined with multidisciplinary team including Transplant Surgeon: Dr. Cruz, Transplant Nephrologist: Dr. Puckett, MAEVE Sun and examined with Dr. Cruz.   Disciplines not in attendance will be notified of the plan.      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 Young, followed by Dr. Raphael, [facility] Oncologist), RA with hand contractures. He underwent DDRT on 12/8/19 (ureteral stent sutured to Martinez - removed 12/15). Post op course c/b DGF requiring HD, thrombocytopenia, elevated LDH and Haptoglobulin. Schistocytes  on peripheral smear concerning for TMA. Envarsus held. Received Belatacept 12/13. Started on PLEX (12/12, 12/15). Underwent renal bx on 12/13 c/w profound ATN.  Found to have much improvement to levels, likely related to Tacrolimus. He was discharged home on 12/20/19 w/ outpatient HD.     Re-admitted 12/27 with influenza, completed treatement with Tamiflu on 12/31. Urine cx on admission grew Ent cloacae, was started on meropenem 12/31. Renal US on admission with increasing hematoma. OR on 1/1 for ex-lap, hematoma evacuation, washout and renal bx. (c/w ATN)  OR cxs grew CRE and e coli. Post op course c/b:   ·	fevers and leukocytosis, CT a/p (1/4) with increased perinephric collection. Underwent IR guided collection 1/6 (fluid cx neg)  ·	New onset hematuria, martinez inserted 1/7, started CBI  ·	RLE edema, RLE doppler (1/8) with acute above the knee thrombus of R external iliac, common femoral and femoral veins, Acute calf vein thrombus affecting R soleal vein, Heparin gtt initiated   ·	AMS 1/9  (head CT neg), hyponatremic  ·	Significant hematuria and bleeding around the martinez. Angio 1/9 showed actively bleeding pseudoaneurysm at the anastomosis of the transplant renal artery with the recipient iliac artery. A stent was placed in the iliac artery to control hemorrhage. IVC filter placed.   ·	Emergent OR 1/9-1/10 for graft nephrectomy;  ureter to bladder anastomosis was completely disrupted, repaired the R external iliac artery with bovine pericardial patch, repaired right external iliac vein, performed thrombectomy of RLE veins, Intra op received 3u PRBC.       cxs:   12/29: Urine Cx: Enterobacter Cloacae  1/1 OR Perinephric collection Cx:  Carbapenem resistant Ent Cloacae, E. Coli  1/1: R kidney abscess swab: Enterobacter Cloacae  1/1  OR tissue: Enterobacter Cloacae  1/5: Urine Cx: Enteropbacter Cloacae  1/5: Skin incision Cx (bedside): NG  1/6: Adominal fluid Cx from IR:  NG   1/7: Urine Cx:  NG  1/7: BCX2:  NG  1/10 retroperit hematoma - enerobacter (pending)  1/10 bladder hematoma - enterobacter (pending)      Interval events:    - POD#2 s/p graft nephrectomy  - afebrile on ABX, VS stable. No pressors required  - H/H stable  - Vascular following: now on heparin gtt  - Anuric via martinez, wound , murky/ss.  - ID following: on zora/cipro/vanco/fluc; OR cxs from 1/10 pending   - tolerating sips of clears  - stable RLE edema     Potential Discharge date: pending clinical improvement      MEDICATIONS  (STANDING):  brimonidine 0.2% Ophthalmic Solution 1 Drop(s) Both EYES three times a day  chlorhexidine 2% Cloths 1 Application(s) Topical <User Schedule>  ciprofloxacin   IVPB 400 milliGRAM(s) IV Intermittent every 24 hours  fluconAZOLE IVPB 400 milliGRAM(s) IV Intermittent every 24 hours  heparin  Infusion 900 Unit(s)/Hr (10 mL/Hr) IV Continuous <Continuous>  latanoprost 0.005% Ophthalmic Solution 1 Drop(s) Both EYES daily  meropenem  IVPB 500 milliGRAM(s) IV Intermittent every 24 hours  methylPREDNISolone sodium succinate Injectable 4 milliGRAM(s) IV Push daily  polyethylene glycol 3350 17 Gram(s) Oral daily    MEDICATIONS  (PRN):  bisacodyl Suppository 10 milliGRAM(s) Rectal daily PRN Constipation  HYDROmorphone  Injectable 0.5 milliGRAM(s) IV Push every 2 hours PRN Moderate Pain (4 - 6)  traMADol 50 milliGRAM(s) Oral every 12 hours PRN Severe Pain (7 - 10)      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  Carpal tunnel syndrome  Brain cyst: had MRI recently- now gone  Anemia  Gastric ulcer: Long time ago  Rheumatoid arthritis  Renal transplant recipient  Breakdown (mechanical) of penile (implanted) prosthesis, sequela  End-stage renal disease (ESRD): PERMACATH PLACEMENT  Abscess of left buttock: s/p I &amp; D  AV fistula: placement left lower arm  S/P cystoscopy: stent placement &amp; removal for kidney stones, lithiotripsy  s/p Lt Carpal tunnel: 2011      Vital Signs Last 24 Hrs  T(C): 37.4 (12 Jan 2020 11:00), Max: 37.5 (12 Jan 2020 07:00)  T(F): 99.3 (12 Jan 2020 11:00), Max: 99.5 (12 Jan 2020 07:00)  HR: 69 (12 Jan 2020 11:00) (69 - 85)  BP: 148/69 (12 Jan 2020 07:00) (148/69 - 148/69)  BP(mean): 99 (12 Jan 2020 07:00) (99 - 99)  RR: 18 (12 Jan 2020 11:00) (10 - 27)  SpO2: 100% (12 Jan 2020 11:00) (96% - 100%)    I&O's Summary    11 Jan 2020 07:01  -  12 Jan 2020 07:00  --------------------------------------------------------  IN: 1303.9 mL / OUT: 3180 mL / NET: -1876.1 mL    12 Jan 2020 07:01  -  12 Jan 2020 11:41  --------------------------------------------------------  IN: 300 mL / OUT: 0 mL / NET: 300 mL                              8.2    20.20 )-----------( 367      ( 12 Jan 2020 03:09 )             23.6     01-12    139  |  99  |  18  ----------------------------<  84  4.0   |  23  |  4.15<H>    Ca    8.1<L>      12 Jan 2020 03:09  Phos  4.2     01-12  Mg     2.2     01-12    TPro  4.9<L>  /  Alb  2.2<L>  /  TBili  1.1  /  DBili  0.6<H>  /  AST  12  /  ALT  <5<L>  /  AlkPhos  95  01-12          Culture - Tissue with Gram Stain (collected 01-10-20 @ 06:07)  Source: .Tissue 1. retroperitoneal hematoma  Gram Stain (01-10-20 @ 13:29):    Few polymorphonuclear leukocytes seen per low power field    No organisms seen per oil power field  Preliminary Report (01-11-20 @ 11:15):    Rare Enterobacter cloacae complex    Culture - Tissue with Gram Stain (collected 01-10-20 @ 06:07)  Source: .Tissue 5. bladder hematoma  Gram Stain (01-10-20 @ 13:35):    No polymorphonuclear leukocytes seen per low power field    No organisms seen per oil power field  Preliminary Report (01-11-20 @ 11:20):    Rare Enterobacter cloacae complex    Culture - Tissue with Gram Stain (collected 01-10-20 @ 06:07)  Source: .Tissue Other  Gram Stain (01-10-20 @ 13:33):    Moderate polymorphonuclear leukocytes seen per low power field    No organisms seen per oil power field  Preliminary Report (01-11-20 @ 08:00):    No growth    Culture - Tissue with Gram Stain (collected 01-10-20 @ 06:07)  Source: .Tissue 3. right veinal artery  Gram Stain (01-10-20 @ 13:29):    Numerous polymorphonuclear leukocytes seen per low power field    No organisms seen per oil power field  Preliminary Report (01-11-20 @ 09:15):    No growth    Culture - Tissue with Gram Stain (collected 01-10-20 @ 06:07)  Source: .Tissue Other  Gram Stain (01-10-20 @ 15:41):    No polymorphonuclear cells seen per low power field    No organisms seen per oil power field  Preliminary Report (01-11-20 @ 08:02):    No growth    Culture - Blood (collected 01-07-20 @ 13:23)  Source: .Blood Blood-Venous  Preliminary Report (01-08-20 @ 14:02):    No growth to date.    Culture - Blood (collected 01-07-20 @ 13:23)  Source: .Blood Blood-Venous  Preliminary Report (01-08-20 @ 14:02):    No growth to date.    Culture - Urine (collected 01-07-20 @ 00:18)  Source: .Urine Catheterized  Final Report (01-07-20 @ 21:22):    <10,000 CFU/mL Normal Urogenital Gillian    Culture - Body Fluid with Gram Stain (collected 01-06-20 @ 21:03)  Source: Abdominal Fl Abdominal Fluid  Gram Stain (01-06-20 @ 23:41):    No polymorphonuclear leukocytes seen    No organisms seen    by cytocentrifuge  Final Report (01-11-20 @ 20:14):    No growth at 5 days    Culture - Other (collected 01-05-20 @ 13:59)  Source: Skin from incision  Final Report (01-07-20 @ 11:15):    No growth at 48 hours    Culture - Urine (collected 01-05-20 @ 13:59)  Source: .Urine Clean Catch (Midstream)  Final Report (01-07-20 @ 20:09):    >100,000 CFU/ml Enterobacter cloacae  Organism: Enterobacter cloacae (01-07-20 @ 20:09)  Organism: Enterobacter cloacae (01-07-20 @ 20:09)                REVIEW OF SYSTEMS  Gen: No weight changes, fatigue, fevers/chills, weakness  Skin: No rashes  Head/Eyes/Ears/Mouth: No headache; Normal hearing; Normal vision w/o blurriness; No sinus pain/discomfort, sore throat  Respiratory: No dyspnea, cough, wheezing, hemoptysis  CV: No chest pain, PND, orthopnea  GI: + abdominal pain, No diarrhea, constipation, nausea, vomiting, melena, hematochezia  : No increased frequency, dysuria, hematuria, nocturia.  Reports resolution of bladder spasms  MSK: No joint pain/swelling; no back pain; no edema  Neuro: No dizziness/lightheadedness, weakness, seizures, numbness, tingling  Heme: No easy bruising or bleeding  Endo: No heat/cold intolerance  Psych: No significant nervousness, anxiety, stress, depression      PHYSICAL EXAM:    Constitutional: NAD  	Eyes: Anicteric, PERRLA  	ENMT: nc/at   	Neck: Supple  	Respiratory: CTA     	Cardiovascular: RRR  	Gastrointestinal: Soft abdomen, appropriate incisional TTP, ND.  wound vac ss/murky output    Genitourinary:  Martinez, anuric, residual hematuria in bag    Extremities: RLE edema   	Vascular: doppler signal R DP and PT, faintly palpable L DP.  3+ pitting edema RLE, 1+LLE . L AVF palpable  	Neurological: A&O x3. no confusion noted on exam  	Skin: no new lesions/ulcerations  	Musculoskeletal: Moving all extremities    Psychiatric: Responsive

## 2020-01-12 NOTE — PROGRESS NOTE ADULT - ASSESSMENT
49 year old male with ESRD on HD, now s/p ddrt on 12/8/2019 course complicated by TMA/profound ATN, who presents from a HD center with a fever and cough. He was initially admitted with influenza. He is s/p washout of infected collection and biopsy which revealed ATN.   He is now found to have a diffusely enlarging perinephric fluid collection and is awaiting drainage.    While on telemetry, he had an episode of a wide complex tachycardia. It initially appears irregular, suggesting an atrial arrhythmia with aberrancy, though the remainder appears more like an episode of non-sustained VT.   s/p washout on 1/1/2020 with worsening sepsis and transfer to ICU, s/p IR drainage of perinephric collection on 1/6. 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 He underwent operative repair of right external iliac artery with pericardial patch, transplant nephrectomy, RLE thrombectomy, and IVC filter on 1/9.       - now extubated   - continue to watch on telemetry  - Tele without AF over last 24 hours  - hold metoprolol in the setting of hypotension  - watch creatinine and electrolytes. Keep K>4, Mg>2  - can check echocardiogram. Last echocardiogram in 1/2019 with normal LV function      - now with dvt, with bleed on heparin gtt, s/p repair of right external iliac artery/vein and thrombectomy  - labile hgb, trend carefully  - cont hep gtt  - f/u vascular eval    - is at risk of abrupt decompensation noting multisystem abnormalities, wct, new dvt. Cont close sicu monitoring.   - will follow with you

## 2020-01-12 NOTE — PROGRESS NOTE ADULT - SUBJECTIVE AND OBJECTIVE BOX
Called re: bleeding around Kennedy.  No UOP, PVR check, 2cc.  Catheter gently flushed this AM.    T(F): 99.3, Max: 99.5 (01-12-20 @ 07:00)  HR: 69  BP: 148/69  SpO2: 100%    OUT:    Indwelling Catheter - Urethral: 130 mL    VAC (Vacuum Assisted Closure) System: 250 mL  Total OUT: 380 mL    OUT:  Total OUT: 0 mL    Gen NAD   Catheter flushed to pink    01-12 @ 03:09  WBC 20.20 / Hct 23.6  / SCr 4.15     01-11 @ 20:27  WBC 19.14 / Hct 24.6  / SCr --       .Tissue Other  01-10  No growth    .Blood Blood-Venous  01-07  No growth to date.

## 2020-01-12 NOTE — PROGRESS NOTE ADULT - SUBJECTIVE AND OBJECTIVE BOX
[facility] -- Melissa Kelsey, Maximino Nunez Pannella, Patel, Savella  Office # [phone]      Follow Up:  PAF    Subjective/Observations: Patient seen and examined. Events noted. Resting comfortably in bed. Now extubated. Feeling overall weak No complaints of chest pain, dyspnea, or palpitations reported.        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  Carpal tunnel syndrome  Brain cyst: had MRI recently- now gone  Anemia  Gastric ulcer: Long time ago  Rheumatoid arthritis  Renal transplant recipient  Breakdown (mechanical) of penile (implanted) prosthesis, sequela  End-stage renal disease (ESRD): PERMACATH PLACEMENT  Abscess of left buttock: s/p I &amp; D  AV fistula: placement left lower arm  S/P cystoscopy: stent placement &amp; removal for kidney stones, lithiotripsy  s/p Lt Carpal tunnel: 2011      MEDICATIONS  (STANDING):  brimonidine 0.2% Ophthalmic Solution 1 Drop(s) Both EYES three times a day  chlorhexidine 2% Cloths 1 Application(s) Topical <User Schedule>  ciprofloxacin   IVPB 400 milliGRAM(s) IV Intermittent every 24 hours  fluconAZOLE IVPB 400 milliGRAM(s) IV Intermittent every 24 hours  heparin  Infusion 900 Unit(s)/Hr (10 mL/Hr) IV Continuous <Continuous>  latanoprost 0.005% Ophthalmic Solution 1 Drop(s) Both EYES daily  meropenem  IVPB 500 milliGRAM(s) IV Intermittent every 24 hours  methylPREDNISolone sodium succinate Injectable 4 milliGRAM(s) IV Push daily  polyethylene glycol 3350 17 Gram(s) Oral daily    MEDICATIONS  (PRN):  bisacodyl Suppository 10 milliGRAM(s) Rectal daily PRN Constipation  HYDROmorphone  Injectable 0.5 milliGRAM(s) IV Push every 2 hours PRN Moderate Pain (4 - 6)  traMADol 50 milliGRAM(s) Oral every 12 hours PRN Severe Pain (7 - 10)      Allergies    coconut (Anaphylaxis)  morphine (Pruritus; Rash)    Intolerances            Vital Signs Last 24 Hrs  T(C): 37.5 (12 Jan 2020 07:00), Max: 37.5 (12 Jan 2020 07:00)  T(F): 99.5 (12 Jan 2020 07:00), Max: 99.5 (12 Jan 2020 07:00)  HR: 72 (12 Jan 2020 10:00) (70 - 85)  BP: 148/69 (12 Jan 2020 07:00) (148/69 - 148/69)  BP(mean): 99 (12 Jan 2020 07:00) (99 - 99)  RR: 10 (12 Jan 2020 10:00) (10 - 27)  SpO2: 100% (12 Jan 2020 10:00) (96% - 100%)    I&O's Summary    11 Jan 2020 07:01  -  12 Jan 2020 07:00  --------------------------------------------------------  IN: 1303.9 mL / OUT: 3180 mL / NET: -1876.1 mL    12 Jan 2020 07:01  -  12 Jan 2020 11:02  --------------------------------------------------------  IN: 290 mL / OUT: 0 mL / NET: 290 mL          PHYSICAL EXAM:  TELE: SR-ST  PACS  Constitutional: NAD, awake    HEENT: Moist Mucous Membranes, Anicteric  Pulmonary: Decreased breath sounds b/l. No rales, crackles or wheeze appreciated.   Cardiovascular: Regular, S1 and S2, No murmurs, rubs, gallops or clicks  Gastrointestinal: Bowel Sounds present, soft, nontender.   Lymph: + peripheral edema. No lymphadenopathy.  Skin: No visible rashes or ulcers.  Psych:  Mood & affect appropriate for situation    LABS: All Labs Reviewed:                        8.2    20.20 )-----------( 367      ( 12 Jan 2020 03:09 )             23.6                         8.4    19.14 )-----------( 331      ( 11 Jan 2020 20:27 )             24.6                         8.8    17.47 )-----------( 325      ( 11 Jan 2020 17:23 )             25.5     12 Jan 2020 03:09    139    |  99     |  18     ----------------------------<  84     4.0     |  23     |  4.15   11 Jan 2020 17:23    138    |  100    |  13     ----------------------------<  109    4.1     |  23     |  3.21   11 Jan 2020 11:28    129    |  99     |  29     ----------------------------<  109    4.9     |  19     |  5.58     Ca    8.1        12 Jan 2020 03:09  Ca    8.0        11 Jan 2020 17:23  Ca    8.0        11 Jan 2020 11:28  Phos  4.2       12 Jan 2020 03:09  Phos  4.0       11 Jan 2020 17:23  Phos  6.3       11 Jan 2020 11:28  Mg     2.2       12 Jan 2020 03:09  Mg     2.1       11 Jan 2020 17:23  Mg     2.5       11 Jan 2020 11:28    TPro  4.9    /  Alb  2.2    /  TBili  1.1    /  DBili  0.6    /  AST  12     /  ALT  <5     /  AlkPhos  95     12 Jan 2020 03:09  TPro  4.0    /  Alb  1.7    /  TBili  0.8    /  DBili  0.6    /  AST  8      /  ALT  5      /  AlkPhos  83     11 Jan 2020 04:59  TPro  3.5    /  Alb  1.4    /  TBili  2.0    /  DBili  1.4    /  AST  7      /  ALT  5      /  AlkPhos  88     10 Jan 2020 04:40    PT/INR - ( 12 Jan 2020 03:09 )   PT: 12.0 sec;   INR: 1.05 ratio         PTT - ( 12 Jan 2020 08:57 )  PTT:78.2 sec

## 2020-01-12 NOTE — PROGRESS NOTE ADULT - ASSESSMENT
49M PMHx of ESRD on HD  ( via LUE AVF), HTN, Gout, Glaucoma, NET of pancreas, RA with hand contractures. s/p DDRT 12/8/19,  Post op course c/b DGF requiring HD. Renal bx x 2 c/w profound ATN.     Re-admitted with Influenza and perinephric hematoma. s/p hematoma evacuation/ abd washout and renal biopsy. Post op course c/b leukocytosis, infected hematoma, acute RLE DVT, and bleeding pseudoaneurysm at txp renal artery anastomosis. Now s/p graft nephrectomy.     [] DDRT c/b DGF/ATN/perinephric infected hematoma/pseudoneurysm/graft nephrectomy   - off immunosupression, cont pred for now  - off  valcyte/bactrim  - HD per nephrology as scheduled  - ID following: cont zora/cipro/fluc. f/u OR cx from 1/10 (growing enterobacter), received amikacin 1/7 and 1/8   - advance diet as  tolerated  - pain control  - bowel regimen  - PT/OT/OOB  - Wound vac change (Monday)    [] New acute DVT of R external iliac, common femoral, femoral veins, R soleal vein requiring heparin drip  - IVC filter placed 1/9  - Vascular following: continue heparin gtt    [] Dispo  - continue SICU care

## 2020-01-12 NOTE — PROGRESS NOTE ADULT - ATTENDING COMMENTS
agree with above  continue antibiotics and anticoagulation  if wbcs continue to rise will get CT scan

## 2020-01-12 NOTE — CHART NOTE - NSCHARTNOTEFT_GEN_A_CORE
-------------------------------------------------------  Interventional Radiology Follow-Up Note  -------------------------------------------------------    Procedure: IVC filter placement and right external iliac artery covered stent placement    Interval Events: Patient reports ongoing pain at site of surgery in RLQ.    Vital Signs:    Vital Signs Last 24 Hrs  T(C): 37.2 (11 Jan 2020 23:00), Max: 37.2 (11 Jan 2020 23:00)  T(F): 99 (11 Jan 2020 23:00), Max: 99 (11 Jan 2020 23:00)  HR: 80 (12 Jan 2020 00:00) (69 - 83)  BP: 134/62 (11 Jan 2020 07:00) (134/62 - 134/62)  BP(mean): 89 (11 Jan 2020 07:00) (89 - 89)  RR: 26 (12 Jan 2020 00:00) (12 - 27)  SpO2: 100% (12 Jan 2020 00:00) (96% - 100%)    Physical Exam:    left groin puncture site soft without hematoma/ecchymosis  right lower extremity tense edema is noted    Labs:           8.4  19.14)-----(331     (01-11-20 @ 20:27)         24.6     138 | 100 | 13  ----------------------< 109     (01-11-20 @ 17:23)  4.1 | 23 | 3.21       PT: -- 01-11-20 @ 20:27  aPTT: 55.0<H> 01-11-20 @ 20:27   INR: -- 01-11-20 @ 20:27    Impression: POD#2 following IVC filter placement and covered stent placement for treatment of pseudoaneurysm at EIA/donor renal artery anastamosis. Covered stent was subsequently removed surgically.    Recommendations:  -IVC filter should be removed when patient has resumed anticoagulation; this can be scheduled during the current hospital stay or as an outpatient depending on clinical course  -Please call interventional radiology at [phone] during normal business hours, or [phone] and page [phone] during call hours or weekends with any questions, concerns or issues.    Everton Cortes MD  PGY5 Interventional Radiology Resident  [phone] Adequate: hears normal conversation without difficulty

## 2020-01-12 NOTE — PROGRESS NOTE ADULT - ASSESSMENT
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     RVP with Influenza A.   CXR Clear  s/p Tamiflu treatment course    UCx with > 100K Enterobacter  s/p Ex-Lap and washout of infected hematoma on 1/1  Growth of Enterobacter from surgical cultures  2 of the isolates intermediate/resistant to Ertapenem but rest susceptible  Unclear significance of this    s/p IR guided drainage attempt of hematoma on 1/6  Significant hematuria with clots after procedure    On 1/10 underwent transplant nephrectomy, repair of right external iliac artery with a bovine pericardial patch, repair of right external iliac vein and thrombectomy of right lower extremity veins.     Possible infected pseudoaneurysm and thrombosis around transplant kidney.   Multiple surgical cultures sent - I would follow up on result  I suspect Enterobacter is the primary pathogen  Can continue current regimen pending cultures   Increase in WBC but abdominal pain improving - agree with plan for repeat imaging    Overall, Leukocytosis (increasing), Infected Hematoma, Positive Urine Culture, Influenza Infection, Fever, Renal Transplant Recipient    --Agree with plans for repeat CT of abdomen/pelvis  --Continue Ciprofloxacin 400 mg IV Q24  --Continue Meropenem 500 mg IV Q24H  --Can continue fluconazole pending surgical cultures but suspicion low for fungal etiology  --Continue to follow CBC with diff  --Continue to follow temperature curve  --Follow up on surgical cultures     I will continue to follow. Please feel free to contact me with any further questions.    Dada Jacobo M.D.  [facility] Division of Infectious Disease  8AM-5PM: Pager Number [phone]  After Hours (or if no response): Please contact the Infectious Diseases Office at [phone]

## 2020-01-12 NOTE — PROGRESS NOTE ADULT - SUBJECTIVE AND OBJECTIVE BOX
HISTORY  49y Male ESRD on HD  (via LUE AVF), HTN, Gout, Glaucoma, PNET s/p robotic distal panc/splenectomy (at [place] 2015 by Dr. Young, followed by Dr. Raphael, [facility] Oncologist), RA with hand contractures. 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 Post op course c/b DGF requiring HD, thrombocytopenia, elevated LDH and Haptoglobulin. Schistocytes  on peripheral smear concerning for TMA. Envarsus held. Received Belatacept 12/13. Started on PLEX (12/12, 12/15). Underwent renal bx on 12/13 c/w profound ATN.  Found to have much improvement to levels, likely related to Tacrolimus. He was discharged home on 12/20/19 w/ outpatient HD.     He was now sent to ED by dialysis center after he was found to be febrile to 102, he did not get HD this AM, and last full HD session was on 12/26. Upon arrival, he was febrile to 101.7, other vitals were stable. He states that he had a cough that began 5-6 days ago that has now mostly resolved. He reports no other febrile episodes other than this morning. He admits to having one SOB episode earlier this week that resolved after he got HD. He also states that his mother was hospitalized earlier this week and was flu+. Currently makes less than 1 cup urine/daily. He currently denies SOB, CP, dyspnea, HA, dizziness, N/V, diarrhea, constipation.     1/1/2020 was taken to OR and found to have a perinephric hematoma. Underwent washout, evacuation of hematoma, and biopsy of transplanted kidney. He was recovering on the floor when overnight he spiked a fever to 38.1 and had 18 beats of wide complex tachycardia. He was also found to have worsening leukocytosis and became acutely hypotensive. A CT scan was obtained demonstrating a 4.5x3.7 perinephric collection. IR was consulted for drainage and SICU was consulted for hemodynamic monitoring.        24 HOUR EVENTS:   - Patient extubated   - Received dialysis with 2 liters zana  - Started low dose heparin gtt   - Restarted tramadol for pain  -     SUBJECTIVE/ROS:  [ ] A ten-point review of systems was otherwise negative except as noted.  [ ] Due to altered mental status/intubation, subjective information were not able to be obtained from the patient. History was obtained, to the extent possible, from review of the chart and collateral sources of information.      NEURO  RASS:     GCS:     CAM ICU:  Exam: awake, alert, oriented  Meds: HYDROmorphone  Injectable 0.5 milliGRAM(s) IV Push every 2 hours PRN Moderate Pain (4 - 6)  traMADol 50 milliGRAM(s) Oral every 12 hours PRN Severe Pain (7 - 10)    [x] Adequacy of sedation and pain control has been assessed and adjusted      RESPIRATORY  RR: 26 (01-12-20 @ 03:00) (12 - 27)  SpO2: 100% (01-12-20 @ 03:00) (96% - 100%)  Wt(kg): --  Exam: unlabored, clear to auscultation bilaterally  Mechanical Ventilation: Mode: CPAP with PS, RR (patient): 16, FiO2: 30, PEEP: 5, PS: 5, MAP: 0.7, PIP: 10  ABG - ( 11 Jan 2020 04:43 )  pH: 7.40  /  pCO2: 36    /  pO2: 171   / HCO3: 22    / Base Excess: -1.9  /  SaO2: 99      Lactate: x                [N/A] Extubation Readiness Assessed  Meds:       CARDIOVASCULAR  HR: 84 (01-12-20 @ 03:00) (70 - 85)  BP: 134/62 (01-11-20 @ 07:00) (134/62 - 134/62)  BP(mean): 89 (01-11-20 @ 07:00) (89 - 89)  ABP: 136/61 (01-12-20 @ 03:00) (102/41 - 159/67)  ABP(mean): 89 (01-12-20 @ 03:00) (63 - 103)  Wt(kg): --  CVP(cm H2O): --      Exam: regular rate and rhythm  Cardiac Rhythm: sinus  Perfusion     [x]Adequate   [ ]Inadequate  Mentation   [x]Normal       [ ]Reduced  Extremities  [x]Warm         [ ]Cool  Volume Status [ ]Hypervolemic [x]Euvolemic [ ]Hypovolemic  Meds:       GI/NUTRITION  Exam: soft, nontender, nondistended, incision C/D/I  Diet:  Meds: bisacodyl Suppository 10 milliGRAM(s) Rectal daily PRN Constipation  pantoprazole  Injectable 40 milliGRAM(s) IV Push daily  polyethylene glycol 3350 17 Gram(s) Oral daily      GENITOURINARY  I&O's Detail    01-10 @ 07:01 - 01-11 @ 07:00  --------------------------------------------------------  IN:    dextrose 5% + sodium chloride 0.9%: 660 mL    Packed Red Blood Cells: 350 mL    propofol Infusion: 315.5 mL    Solution: 900 mL  Total IN: 2225.5 mL    OUT:    Indwelling Catheter - Urethral: 5 mL  Total OUT: 5 mL    Total NET: 2220.5 mL      01-11 @ 07:01 - 01-12 @ 03:15  --------------------------------------------------------  IN:    dextrose 5% + sodium chloride 0.45%.: 30 mL    dextrose 5% + sodium chloride 0.9%: 90 mL    heparin Infusion: 92 mL    Other: 800 mL    propofol Infusion: 13.4 mL  Total IN: 1025.4 mL    OUT:    Indwelling Catheter - Urethral: 130 mL    Other: 2800 mL    VAC (Vacuum Assisted Closure) System: 200 mL  Total OUT: 3130 mL    Total NET: -2104.6 mL          01-11    138  |  100  |  13  ----------------------------<  109<H>  4.1   |  23  |  3.21<H>    Ca    8.0<L>      11 Jan 2020 17:23  Phos  4.0     01-11  Mg     2.1     01-11    TPro  4.0<L>  /  Alb  1.7<L>  /  TBili  0.8  /  DBili  0.6<H>  /  AST  8<L>  /  ALT  5<L>  /  AlkPhos  83  01-11    [ ] Kennedy catheter, indication: N/A  Meds: dextrose 5% + sodium chloride 0.45%. 1000 milliLiter(s) IV Continuous <Continuous>        HEMATOLOGIC  Meds: heparin  Infusion 900 Unit(s)/Hr IV Continuous <Continuous>    [x] VTE Prophylaxis                        8.4    19.14 )-----------( 331      ( 11 Jan 2020 20:27 )             24.6     PT/INR - ( 11 Jan 2020 17:23 )   PT: 11.3 sec;   INR: 0.99 ratio         PTT - ( 11 Jan 2020 20:27 )  PTT:55.0 sec  Transfusion     [ ] PRBC   [ ] Platelets   [ ] FFP   [ ] Cryoprecipitate      INFECTIOUS DISEASES  WBC Count: 19.14 K/uL (01-11 @ 20:27)  WBC Count: 17.47 K/uL (01-11 @ 17:23)  WBC Count: 18.21 K/uL (01-11 @ 11:28)  WBC Count: 18.25 K/uL (01-11 @ 04:59)    RECENT CULTURES:  Specimen Source: .Tissue Other  Date/Time: 01-10 @ 06:07  Culture Results:   No growth  Gram Stain:   No polymorphonuclear cells seen per low power field  No organisms seen per oil power field  Organism: --  Specimen Source: .Blood Blood-Venous  Date/Time: 01-07 @ 13:23  Culture Results:   No growth to date.  Gram Stain: --  Organism: --    Meds: ciprofloxacin   IVPB 400 milliGRAM(s) IV Intermittent every 24 hours  fluconAZOLE IVPB 400 milliGRAM(s) IV Intermittent every 24 hours  meropenem  IVPB 500 milliGRAM(s) IV Intermittent every 24 hours        ENDOCRINE  CAPILLARY BLOOD GLUCOSE        Meds: methylPREDNISolone sodium succinate Injectable 4 milliGRAM(s) IV Push daily        ACCESS DEVICES:  [ ] Peripheral IV  [ ] Central Venous Line	[ ] R	[ ] L	[ ] IJ	[ ] Fem	[ ] SC	Placed:   [ ] Arterial Line		[ ] R	[ ] L	[ ] Fem	[ ] Rad	[ ] Ax	Placed:   [ ] PICC:					[ ] Mediport  [ ] Urinary Catheter, Date Placed:   [x] Necessity of urinary, arterial, and venous catheters discussed    OTHER MEDICATIONS:  brimonidine 0.2% Ophthalmic Solution 1 Drop(s) Both EYES three times a day  chlorhexidine 2% Cloths 1 Application(s) Topical <User Schedule>  latanoprost 0.005% Ophthalmic Solution 1 Drop(s) Both EYES daily  lidocaine 2% Jelly 1 milliLiter(s) IntraUrethral two times a day PRN      CODE STATUS:      IMAGING: HISTORY  49y Male ESRD on HD  (via LUE AVF), HTN, Gout, Glaucoma, PNET s/p robotic distal panc/splenectomy (at [place] 2015 by Dr. Young, followed by Dr. Raphael, [facility] Oncologist), RA with hand contractures. 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 Post op course c/b DGF requiring HD, thrombocytopenia, elevated LDH and Haptoglobulin. Schistocytes  on peripheral smear concerning for TMA. Envarsus held. Received Belatacept 12/13. Started on PLEX (12/12, 12/15). Underwent renal bx on 12/13 c/w profound ATN.  Found to have much improvement to levels, likely related to Tacrolimus. He was discharged home on 12/20/19 w/ outpatient HD.     He was now sent to ED by dialysis center after he was found to be febrile to 102, he did not get HD this AM, and last full HD session was on 12/26. Upon arrival, he was febrile to 101.7, other vitals were stable. He states that he had a cough that began 5-6 days ago that has now mostly resolved. He reports no other febrile episodes other than this morning. He admits to having one SOB episode earlier this week that resolved after he got HD. He also states that his mother was hospitalized earlier this week and was flu+. Currently makes less than 1 cup urine/daily. He currently denies SOB, CP, dyspnea, HA, dizziness, N/V, diarrhea, constipation.     1/1/2020 was taken to OR and found to have a perinephric hematoma. Underwent washout, evacuation of hematoma, and biopsy of transplanted kidney. He was recovering on the floor when overnight he spiked a fever to 38.1 and had 18 beats of wide complex tachycardia. He was also found to have worsening leukocytosis and became acutely hypotensive. A CT scan was obtained demonstrating a 4.5x3.7 perinephric collection. IR was consulted for drainage and SICU was consulted for hemodynamic monitoring.        24 HOUR EVENTS:   - Patient extubated   - Received dialysis with 2 liters removed  - Started heparin gtt   - Restarted tramadol for pain      SUBJECTIVE/ROS:  [ ] A ten-point review of systems was otherwise negative except as noted.  [ ] Due to altered mental status/intubation, subjective information were not able to be obtained from the patient. History was obtained, to the extent possible, from review of the chart and collateral sources of information.      NEURO  RASS:     GCS:     CAM ICU:  Exam: awake, alert, oriented  Meds: HYDROmorphone  Injectable 0.5 milliGRAM(s) IV Push every 2 hours PRN Moderate Pain (4 - 6)  traMADol 50 milliGRAM(s) Oral every 12 hours PRN Severe Pain (7 - 10)    [x] Adequacy of sedation and pain control has been assessed and adjusted      RESPIRATORY  RR: 26 (01-12-20 @ 03:00) (12 - 27)  SpO2: 100% (01-12-20 @ 03:00) (96% - 100%)  Wt(kg): --  Exam: unlabored, equal chest rise  Mechanical Ventilation: Mode: CPAP with PS, RR (patient): 16, FiO2: 30, PEEP: 5, PS: 5, MAP: 0.7, PIP: 10  ABG - ( 11 Jan 2020 04:43 )  pH: 7.40  /  pCO2: 36    /  pO2: 171   / HCO3: 22    / Base Excess: -1.9  /  SaO2: 99      Lactate: x          [N/A] Extubation Readiness Assessed  Meds:       CARDIOVASCULAR  HR: 84 (01-12-20 @ 03:00) (70 - 85)  BP: 134/62 (01-11-20 @ 07:00) (134/62 - 134/62)  BP(mean): 89 (01-11-20 @ 07:00) (89 - 89)  ABP: 136/61 (01-12-20 @ 03:00) (102/41 - 159/67)  ABP(mean): 89 (01-12-20 @ 03:00) (63 - 103)  Wt(kg): --  CVP(cm H2O): --      Exam: regular rate and rhythm  Cardiac Rhythm: sinus  Perfusion     [x]Adequate   [ ]Inadequate  Mentation   [x]Normal       [ ]Reduced  Extremities  [x]Warm         [ ]Cool  Volume Status [ ]Hypervolemic [x]Euvolemic [ ]Hypovolemic  Meds:       GI/NUTRITION  Exam: soft, nontender, nondistended, incision dressed  Diet: regular renal diet  Meds: bisacodyl Suppository 10 milliGRAM(s) Rectal daily PRN Constipation  pantoprazole  Injectable 40 milliGRAM(s) IV Push daily  polyethylene glycol 3350 17 Gram(s) Oral daily      GENITOURINARY  I&O's Detail    01-10 @ 07:01 - 01-11 @ 07:00  --------------------------------------------------------  IN:    dextrose 5% + sodium chloride 0.9%: 660 mL    Packed Red Blood Cells: 350 mL    propofol Infusion: 315.5 mL    Solution: 900 mL  Total IN: 2225.5 mL    OUT:    Indwelling Catheter - Urethral: 5 mL  Total OUT: 5 mL    Total NET: 2220.5 mL      01-11 @ 07:01 - 01-12 @ 03:15  --------------------------------------------------------  IN:    dextrose 5% + sodium chloride 0.45%.: 30 mL    dextrose 5% + sodium chloride 0.9%: 90 mL    heparin Infusion: 92 mL    Other: 800 mL    propofol Infusion: 13.4 mL  Total IN: 1025.4 mL    OUT:    Indwelling Catheter - Urethral: 130 mL    Other: 2800 mL    VAC (Vacuum Assisted Closure) System: 200 mL  Total OUT: 3130 mL    Total NET: -2104.6 mL          01-11    138  |  100  |  13  ----------------------------<  109<H>  4.1   |  23  |  3.21<H>    Ca    8.0<L>      11 Jan 2020 17:23  Phos  4.0     01-11  Mg     2.1     01-11    TPro  4.0<L>  /  Alb  1.7<L>  /  TBili  0.8  /  DBili  0.6<H>  /  AST  8<L>  /  ALT  5<L>  /  AlkPhos  83  01-11    [ ] Kennedy catheter, indication: N/A  Meds: dextrose 5% + sodium chloride 0.45%. 1000 milliLiter(s) IV Continuous <Continuous>        HEMATOLOGIC  Meds: heparin  Infusion 900 Unit(s)/Hr IV Continuous <Continuous>    [x] VTE Prophylaxis                        8.4    19.14 )-----------( 331      ( 11 Jan 2020 20:27 )             24.6     PT/INR - ( 11 Jan 2020 17:23 )   PT: 11.3 sec;   INR: 0.99 ratio         PTT - ( 11 Jan 2020 20:27 )  PTT:55.0 sec  Transfusion     [ ] PRBC   [ ] Platelets   [ ] FFP   [ ] Cryoprecipitate      INFECTIOUS DISEASES  WBC Count: 19.14 K/uL (01-11 @ 20:27)  WBC Count: 17.47 K/uL (01-11 @ 17:23)  WBC Count: 18.21 K/uL (01-11 @ 11:28)  WBC Count: 18.25 K/uL (01-11 @ 04:59)    RECENT CULTURES:  Specimen Source: .Tissue Other  Date/Time: 01-10 @ 06:07  Culture Results:   No growth  Gram Stain:   No polymorphonuclear cells seen per low power field  No organisms seen per oil power field  Organism: --  Specimen Source: .Blood Blood-Venous  Date/Time: 01-07 @ 13:23  Culture Results:   No growth to date.  Gram Stain: --  Organism: --    Meds: ciprofloxacin   IVPB 400 milliGRAM(s) IV Intermittent every 24 hours  fluconAZOLE IVPB 400 milliGRAM(s) IV Intermittent every 24 hours  meropenem  IVPB 500 milliGRAM(s) IV Intermittent every 24 hours        ENDOCRINE  CAPILLARY BLOOD GLUCOSE        Meds: methylPREDNISolone sodium succinate Injectable 4 milliGRAM(s) IV Push daily        ACCESS DEVICES:  [ ] Peripheral IV  [ ] Central Venous Line	[ ] R	[ ] L	[ ] IJ	[ ] Fem	[ ] SC	Placed:   [ ] Arterial Line		[ ] R	[ ] L	[ ] Fem	[ ] Rad	[ ] Ax	Placed:   [ ] PICC:					[ ] Mediport  [ ] Urinary Catheter, Date Placed:   [x] Necessity of urinary, arterial, and venous catheters discussed    OTHER MEDICATIONS:  brimonidine 0.2% Ophthalmic Solution 1 Drop(s) Both EYES three times a day  chlorhexidine 2% Cloths 1 Application(s) Topical <User Schedule>  latanoprost 0.005% Ophthalmic Solution 1 Drop(s) Both EYES daily  lidocaine 2% Jelly 1 milliLiter(s) IntraUrethral two times a day PRN      CODE STATUS:      IMAGING:

## 2020-01-12 NOTE — PROGRESS NOTE ADULT - SUBJECTIVE AND OBJECTIVE BOX
Vascular Surgery Progress Note     Subjective/24hour Events: No acute events overnight. Patient reports pain that is managed on current regimen. Started on fluconazole for increased WBC and concern for fungemia.     Vital Signs:  Vital Signs Last 24 Hrs  T(C): 37.5 (12 Jan 2020 07:00), Max: 37.5 (12 Jan 2020 07:00)  T(F): 99.5 (12 Jan 2020 07:00), Max: 99.5 (12 Jan 2020 07:00)  HR: 72 (12 Jan 2020 10:00) (70 - 85)  BP: 148/69 (12 Jan 2020 07:00) (148/69 - 148/69)  BP(mean): 99 (12 Jan 2020 07:00) (99 - 99)  RR: 10 (12 Jan 2020 10:00) (10 - 27)  SpO2: 100% (12 Jan 2020 10:00) (96% - 100%)        I&O's Detail    11 Jan 2020 07:01  -  12 Jan 2020 07:00  --------------------------------------------------------  IN:    dextrose 5% + sodium chloride 0.45%.: 240 mL    dextrose 5% + sodium chloride 0.9%: 90 mL    heparin Infusion: 160.5 mL    Other: 800 mL    propofol Infusion: 13.4 mL  Total IN: 1303.9 mL    OUT:    Indwelling Catheter - Urethral: 130 mL    Other: 2800 mL    VAC (Vacuum Assisted Closure) System: 250 mL  Total OUT: 3180 mL    Total NET: -1876.1 mL      12 Jan 2020 07:01  -  12 Jan 2020 11:13  --------------------------------------------------------  IN:    dextrose 5% + sodium chloride 0.45%.: 60 mL    heparin Infusion: 30 mL    Solution: 200 mL  Total IN: 290 mL    OUT:  Total OUT: 0 mL    Total NET: 290 mL            MEDICATIONS  (STANDING):  brimonidine 0.2% Ophthalmic Solution 1 Drop(s) Both EYES three times a day  chlorhexidine 2% Cloths 1 Application(s) Topical <User Schedule>  ciprofloxacin   IVPB 400 milliGRAM(s) IV Intermittent every 24 hours  fluconAZOLE IVPB 400 milliGRAM(s) IV Intermittent every 24 hours  heparin  Infusion 900 Unit(s)/Hr (10 mL/Hr) IV Continuous <Continuous>  latanoprost 0.005% Ophthalmic Solution 1 Drop(s) Both EYES daily  meropenem  IVPB 500 milliGRAM(s) IV Intermittent every 24 hours  methylPREDNISolone sodium succinate Injectable 4 milliGRAM(s) IV Push daily  polyethylene glycol 3350 17 Gram(s) Oral daily    MEDICATIONS  (PRN):  bisacodyl Suppository 10 milliGRAM(s) Rectal daily PRN Constipation  HYDROmorphone  Injectable 0.5 milliGRAM(s) IV Push every 2 hours PRN Moderate Pain (4 - 6)  traMADol 50 milliGRAM(s) Oral every 12 hours PRN Severe Pain (7 - 10)        Physical Exam:  Gen: male of stated age, in NAD  Resp: no increased work of breathing, no SOB, no wheezing, rales, or crackles  Card: RRR, no Murmurs, Rubs, or Gallops  GI: soft, non-tender, non-distended  Ext:     LLE: groin without hematoma, edematous leg palp DP, sensorimotor intact    RLE: groin vac'd, edematous leg palp DP, sensorimotor intact        Labs:    01-12    139  |  99  |  18  ----------------------------<  84  4.0   |  23  |  4.15<H>    Ca    8.1<L>      12 Jan 2020 03:09  Phos  4.2     01-12  Mg     2.2     01-12    TPro  4.9<L>  /  Alb  2.2<L>  /  TBili  1.1  /  DBili  0.6<H>  /  AST  12  /  ALT  <5<L>  /  AlkPhos  95  01-12    LIVER FUNCTIONS - ( 12 Jan 2020 03:09 )  Alb: 2.2 g/dL / Pro: 4.9 g/dL / ALK PHOS: 95 U/L / ALT: <5 U/L / AST: 12 U/L / GGT: x                                 8.2    20.20 )-----------( 367      ( 12 Jan 2020 03:09 )             23.6     PT/INR - ( 12 Jan 2020 03:09 )   PT: 12.0 sec;   INR: 1.05 ratio         PTT - ( 12 Jan 2020 08:57 )  PTT:78.2 sec

## 2020-01-12 NOTE — PROGRESS NOTE ADULT - SUBJECTIVE AND OBJECTIVE BOX
Follow Up:  Infected Perinephric Hematoma    Interval History: afebrile overnight. notes improved abdominal pain. no diarrhea    REVIEW OF SYSTEMS  [  ] ROS unobtainable because:    [  x] All other systems negative except as noted below    Constitutional:  [ ] fever [ ] chills  [ ] weight loss  [ ] weakness  Skin:  [ ] rash [ ] phlebitis	  Eyes: [ ] icterus [ ] pain  [ ] discharge	  ENMT: [ ] sore throat  [ ] thrush [ ] ulcers [ ] exudates  Respiratory: [ ] dyspnea [ ] hemoptysis [ ] cough [ ] sputum	  Cardiovascular:  [ ] chest pain [ ] palpitations [ ] edema	  Gastrointestinal:  [ ] nausea [ ] vomiting [ ] diarrhea [ ] constipation [x ] pain	  Genitourinary:  [ ] dysuria [ ] frequency [ ] hematuria [ ] discharge [ ] flank pain  [ ] incontinence  Musculoskeletal:  [ ] myalgias [ ] arthralgias [ ] arthritis  [ ] back pain  Neurological:  [ ] headache [ ] seizures  [ ] confusion/altered mental status    Allergies  coconut (Anaphylaxis)  morphine (Pruritus; Rash)        ANTIMICROBIALS:  ciprofloxacin   IVPB 400 every 24 hours  fluconAZOLE IVPB 400 every 24 hours  meropenem  IVPB 500 every 24 hours      OTHER MEDS:  MEDICATIONS  (STANDING):  bisacodyl Suppository 10 daily PRN  heparin  Infusion 950 <Continuous>  HYDROmorphone  Injectable 0.5 every 2 hours PRN  methylPREDNISolone sodium succinate Injectable 4 daily  polyethylene glycol 3350 17 daily  traMADol 50 every 12 hours PRN      Vital Signs Last 24 Hrs  T(C): 36.9 (12 Jan 2020 15:00), Max: 37.5 (12 Jan 2020 07:00)  T(F): 98.4 (12 Jan 2020 15:00), Max: 99.5 (12 Jan 2020 07:00)  HR: 70 (12 Jan 2020 17:00) (68 - 85)  BP: 148/69 (12 Jan 2020 07:00) (148/69 - 148/69)  BP(mean): 99 (12 Jan 2020 07:00) (99 - 99)  RR: 15 (12 Jan 2020 17:00) (10 - 26)  SpO2: 100% (12 Jan 2020 17:00) (100% - 100%)    PHYSICAL EXAMINATION:  General: Alert and Awake, NAD  HEENT: PERRL, EOMI  Neck: Supple  Cardiac: RRR, No M/R/G  Resp: CTAB, No Wh/Rh/Ra  Abdomen: +RLQ wound vac over transplant nephrectomy site, NBS, tenderness to palpation over RLQ, No HSM, No rigidity or guarding  MSK: No LE edema. No Calf tenderness  : No martinez  Skin: No rashes or lesions. Skin is warm and dry to the touch.   Neuro: Alert and Awake. CN 2-12 Grossly intact. Moves all four extremities spontaneously.  Psych: Calm, Pleasant, Cooperative                            8.2    20.20 )-----------( 367      ( 12 Jan 2020 03:09 )             23.6       01-12    139  |  99  |  18  ----------------------------<  84  4.0   |  23  |  4.15<H>    Ca    8.1<L>      12 Jan 2020 03:09  Phos  4.2     01-12  Mg     2.2     01-12    TPro  4.9<L>  /  Alb  2.2<L>  /  TBili  1.1  /  DBili  0.6<H>  /  AST  12  /  ALT  <5<L>  /  AlkPhos  95  01-12          MICROBIOLOGY:  v  .Tissue Other  01-10-20   No growth  --    No polymorphonuclear cells seen per low power field  No organisms seen per oil power field      .Blood Blood-Venous  01-07-20   No growth at 5 days.  --  --      .Urine Catheterized  01-07-20   <10,000 CFU/mL Normal Urogenital Gillian  --  --      Abdominal Fl Abdominal Fluid  01-06-20   No growth at 5 days  --    No polymorphonuclear leukocytes seen  No organisms seen  by cytocentrifuge      .Urine Clean Catch (Midstream)  01-05-20   >100,000 CFU/ml Enterobacter cloacae  --  Enterobacter cloacae      .Blood Blood  01-04-20   No growth at 5 days.  --  --      .Tissue Other  01-01-20   Numerous Enterobacter cloacae complex  --  Enterobacter cloacae complex      .Surgical Swab collection around right kidney  01-01-20   Moderate Enterobacter cloacae complex  --  Enterobacter cloacae complex      .Body Fluid periphrenic collection  01-01-20   **Please Note**: This is a Corrected Report**  Moderate Enterobacter cloacae complex  Moderate Escherichia coli  Previously reported as:  Moderate Enterobacter cloacae (Carbapenem Resistant)  Moderate Escherichia coli  --  Escherichia coli  Enterobacter cloacae complex      .Surgical Swab right kidney abscess  01-01-20   Moderate Enterobacter cloacae complex  --  Enterobacter cloacae complex      .Urine Clean Catch (Midstream)  12-29-19   >100,000 CFU/ml Enterobacter cloacae  --  Enterobacter cloacae      .Blood Blood-Peripheral  12-27-19   No growth at 5 days.  --  --      .Stool Feces  12-14-19   No enteric pathogens isolated.  (Stool culture examined for Salmonella,  Shigella, Campylobacter, Aeromonas, Plesiomonas,  Vibrio, E.coli O157 and Yersinia)  --  --      .Stool moriah johnie  12-14-19   GI PCR Results: NOT detected  *******Please Note:*******  GI panel PCR evaluates for:  Campylobacter, Plesiomonas shigelloides, Salmonella,  Vibrio, Yersinia enterocolitica, Enteroaggregative  Escherichia coli (EAEC), Enteropathogenic E.coli (EPEC),  Enterotoxigenic E. coli (ETEC) lt/st, Shiga-like  toxin-producing E. coli (STEC) stx1/stx2,  Shigella/ Enteroinvasive E. coli (EIEC), Cryptosporidium,  Cyclospora cayetanensis, Entamoeba histolytica,  Giardia lamblia, Adenovirus F 40/41, Astrovirus,  Norovirus GI/GII, Rotavirus A, Sapovirus  --  --    RADIOLOGY:    <The imaging below has been reviewed and visualized me independently>    EXAM:  XR CHEST PORTABLE ROUTINE 1V                        PROCEDURE DATE:  01/12/2020    Minimal right mid to lower lung linear or subsegmental atelectasis. No pneumothorax. Left upper rib deformities as on the prior study. Old left clavicular fracture as on the prior study.

## 2020-01-12 NOTE — PROGRESS NOTE ADULT - ASSESSMENT
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 Post op course c/b DGF s/p renal bx on 12/13 c/b perirenal hematoma/abscess s/p washout 1/1. Patient is s/p IR aspiration of persistent collection seen inferior to pelvic kidney, now with new-onset gross hematuria. Patient taken to OR after IR for explantation of transplanted kidney and repair of bladder perforation. Vascular surgery called intra-op and repaired the right external iliac artery was repaired with a bovine pericardial patch, repaired the right external iliac vein, performed a thrombectomy of the right lower extremity veins    Bladder gently irrigated through martinez to pink.     - Minimal UOP  - PVR checked, 2cc  - Irrigate Martinez PRN  - Care per primary team

## 2020-01-12 NOTE — PROGRESS NOTE ADULT - SUBJECTIVE AND OBJECTIVE BOX
[facility] NEPHROLOGY SERVICES, [facility]  NEPHROLOGY AND HYPERTENSION  [address]  [address]  [place] [address]  [phone]    MD GIANA GUERRERO MD ANDREY GONCHARUK, MD MADHU KORRAPATI, MD YELENA ROSENBERG, MD SALUD GARZA, MD CATRINA WOO MD          Patient feels well no complaints today.    MEDICATIONS  (STANDING):  brimonidine 0.2% Ophthalmic Solution 1 Drop(s) Both EYES three times a day  chlorhexidine 2% Cloths 1 Application(s) Topical <User Schedule>  ciprofloxacin   IVPB 400 milliGRAM(s) IV Intermittent every 24 hours  fluconAZOLE IVPB 400 milliGRAM(s) IV Intermittent every 24 hours  heparin  Infusion 950 Unit(s)/Hr (9.5 mL/Hr) IV Continuous <Continuous>  latanoprost 0.005% Ophthalmic Solution 1 Drop(s) Both EYES daily  meropenem  IVPB 500 milliGRAM(s) IV Intermittent every 24 hours  methylPREDNISolone sodium succinate Injectable 4 milliGRAM(s) IV Push daily  polyethylene glycol 3350 17 Gram(s) Oral daily    MEDICATIONS  (PRN):  bisacodyl Suppository 10 milliGRAM(s) Rectal daily PRN Constipation  HYDROmorphone  Injectable 0.5 milliGRAM(s) IV Push every 2 hours PRN Moderate Pain (4 - 6)  traMADol 50 milliGRAM(s) Oral every 12 hours PRN Severe Pain (7 - 10)      01-11-20 @ 07:01 - 01-12-20 @ 07:00  --------------------------------------------------------  IN: 1303.9 mL / OUT: 3180 mL / NET: -1876.1 mL    01-12-20 @ 07:01 - 01-12-20 @ 18:18  --------------------------------------------------------  IN: 468.5 mL / OUT: 105 mL / NET: 363.5 mL      PHYSICAL EXAM:      T(C): 36.9 (01-12-20 @ 15:00), Max: 37.5 (01-12-20 @ 07:00)  HR: 70 (01-12-20 @ 17:00) (68 - 85)  BP: 148/69 (01-12-20 @ 07:00) (148/69 - 148/69)  RR: 15 (01-12-20 @ 17:00) (10 - 26)  SpO2: 100% (01-12-20 @ 17:00) (100% - 100%)  Wt(kg): --  Respiratory: clear anteriorly, decreased BS at bases  Cardiovascular: S1 S2  Gastrointestinal: soft NT ND +BS  Extremities:   1-2 edema                                    8.2    20.20 )-----------( 367      ( 12 Jan 2020 03:09 )             23.6     01-12    139  |  99  |  18  ----------------------------<  84  4.0   |  23  |  4.15<H>    Ca    8.1<L>      12 Jan 2020 03:09  Phos  4.2     01-12  Mg     2.2     01-12    TPro  4.9<L>  /  Alb  2.2<L>  /  TBili  1.1  /  DBili  0.6<H>  /  AST  12  /  ALT  <5<L>  /  AlkPhos  95  01-12    ABG - ( 11 Jan 2020 04:43 )  pH, Arterial: 7.40  pH, Blood: x     /  pCO2: 36    /  pO2: 171   / HCO3: 22    / Base Excess: -1.9  /  SaO2: 99                LIVER FUNCTIONS - ( 12 Jan 2020 03:09 )  Alb: 2.2 g/dL / Pro: 4.9 g/dL / ALK PHOS: 95 U/L / ALT: <5 U/L / AST: 12 U/L / GGT: x           Creatinine Trend: 4.15<--, 3.21<--, 5.58<--, 5.36<--, 5.05<--, 4.73<--      A/P:    Adm w/Flu A 12/27  Hx ESRD on HD;   S/p 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

## 2020-01-12 NOTE — PROGRESS NOTE ADULT - ASSESSMENT
KAYLA PA is a 49y Male with ESRD s/p DDRT on 12/8/19 with course complicated by delayed graft function requiring HD, and infected perinephric hematoma s/p washout on 1/1/2020 with worsening sepsis and transfer to ICU, s/p IR drainage of perinephric collection on 1/6. 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 He underwent operative repair of right external iliac artery with pericardial patch, transplant nephrectomy, RLE thrombectomy, and IVC filter on 1/9.     PLAN:    NEURO:   - A/Ox3  - Dilaudid and tramadol prn for pain    RESPIRATORY:  acute respiratory failure  - Extubated  - No active issues    CARDIOVASCULAR:   - Monitor hemodynamics  - Holding home hydralazine, metoprolol    GI/NUTRITION: no acute issues  - Regular renal diet  - Miralax and dulcolax suppository prn, monitor BMs    GENITOURINARY/RENAL: s/p transplant nephrectomy   - D5 1/2 NS @ 30 ml/hr  - Monitor electrolytes  - Continue w/ HD  - Transplant meds: solumedrol IV 4 mg daily (switch to PO if nausea improves)    HEMATOLOGIC: acute hemorrhagic anemia  - Trend H/H, coagulation panel  - Therapeutic heparin gtt goal PTT 60-80    INFECTIOUS DISEASE: infected perinephric hematoma s/p explantation, washout  - Meropenem 500 mg daily  - Fluconazole 400 mg daily  - Ciprofloxacin 400mg daily  - given one dose od vancomycin 1/10     ENDOCRINE: no acute issues  - Monitor glucose on BMP    OPHTHALMOLOGY:  - Brimonidine/latanoprost drops

## 2020-01-12 NOTE — PROGRESS NOTE ADULT - ASSESSMENT
49y Male with history of ESRD on HD  (via LUE AVF), HTN, Gout, Glaucoma, PNET s/p robotic distal panc/splenectomy (at [facility] 2015 by Dr. Young, followed by Dr. Raphael, [facility] Oncologist), RA with hand contractures who underwent DDRT on 12/8/19 (ureteral stent sutured to [place] - removed 12/15). Post op course c/b DGF requiring HD and TMA. Underwent renal bx on 12/13 c/w profound ATN. 1/1/2020 was taken to OR and found to have a perinephric hematoma. Underwent washout, evacuation of hematoma, and biopsy of transplanted kidney. POD4 a CT scan was obtained demonstrating a 4.5x3.7 perinephric collection. IR was consulted for drainage and SICU was consulted for hemodynamic monitoring. 1/8 found to have RLE DVT involving ext iliac, common fem veins. 1/10- Patient taken to OR after IR for explantation of transplanted kidney and repair of bladder perforation. Vascular surgery called intra-op and repaired the right external iliac artery was repaired with a bovine pericardial patch, repaired the right external iliac vein, performed a thrombectomy of the right lower extremity veins.      Plan:   - Routine neurovascular checks  - Recommend CT and/or Echo for increasing WBC  - continue Hep Gtt at therapeutic levels  - Continue care as per transplant/SICU teams    VASCULAR [id number]

## 2020-01-12 NOTE — PROGRESS NOTE ADULT - ATTENDING COMMENTS
Awake and alert  S/p extubated, tolerating well on RA, CXR no acute finding  Hemodynamically stable  On Heparin drip gtt, HCT platelets have been stable, peripheral pulses present  Persistent leucocytosis, on Meropenem, Ciprofloxacin and diflucan, received one dose Vanco 2 days ago  DC IVF, s/p HD  Discussed with Dr Zee and transplant team

## 2020-01-13 ENCOUNTER — APPOINTMENT (OUTPATIENT)
Dept: TRANSPLANT | Facility: CLINIC | Age: 50
End: 2020-01-13

## 2020-01-13 LAB
-  AMIKACIN: SIGNIFICANT CHANGE UP
-  AMIKACIN: SIGNIFICANT CHANGE UP
-  AMOXICILLIN/CLAVULANIC ACID: SIGNIFICANT CHANGE UP
-  AMOXICILLIN/CLAVULANIC ACID: SIGNIFICANT CHANGE UP
-  AMPICILLIN/SULBACTAM: SIGNIFICANT CHANGE UP
-  AMPICILLIN/SULBACTAM: SIGNIFICANT CHANGE UP
-  AMPICILLIN: SIGNIFICANT CHANGE UP
-  AMPICILLIN: SIGNIFICANT CHANGE UP
-  AZTREONAM: SIGNIFICANT CHANGE UP
-  AZTREONAM: SIGNIFICANT CHANGE UP
-  CEFAZOLIN: SIGNIFICANT CHANGE UP
-  CEFAZOLIN: SIGNIFICANT CHANGE UP
-  CEFEPIME: SIGNIFICANT CHANGE UP
-  CEFEPIME: SIGNIFICANT CHANGE UP
-  CEFOXITIN: SIGNIFICANT CHANGE UP
-  CEFOXITIN: SIGNIFICANT CHANGE UP
-  CEFTAZIDIME/AVIBACTAM: SIGNIFICANT CHANGE UP
-  CEFTAZIDIME/AVIBACTAM: SIGNIFICANT CHANGE UP
-  CEFTOLOZANE/TAZOBACTAM: SIGNIFICANT CHANGE UP
-  CEFTOLOZANE/TAZOBACTAM: SIGNIFICANT CHANGE UP
-  CEFTRIAXONE: SIGNIFICANT CHANGE UP
-  CEFTRIAXONE: SIGNIFICANT CHANGE UP
-  CIPROFLOXACIN: SIGNIFICANT CHANGE UP
-  CIPROFLOXACIN: SIGNIFICANT CHANGE UP
-  ERTAPENEM: SIGNIFICANT CHANGE UP
-  ERTAPENEM: SIGNIFICANT CHANGE UP
-  GENTAMICIN: SIGNIFICANT CHANGE UP
-  GENTAMICIN: SIGNIFICANT CHANGE UP
-  IMIPENEM: SIGNIFICANT CHANGE UP
-  IMIPENEM: SIGNIFICANT CHANGE UP
-  LEVOFLOXACIN: SIGNIFICANT CHANGE UP
-  LEVOFLOXACIN: SIGNIFICANT CHANGE UP
-  MEROPENEM: SIGNIFICANT CHANGE UP
-  MEROPENEM: SIGNIFICANT CHANGE UP
-  PIPERACILLIN/TAZOBACTAM: SIGNIFICANT CHANGE UP
-  PIPERACILLIN/TAZOBACTAM: SIGNIFICANT CHANGE UP
-  TOBRAMYCIN: SIGNIFICANT CHANGE UP
-  TOBRAMYCIN: SIGNIFICANT CHANGE UP
-  TRIMETHOPRIM/SULFAMETHOXAZOLE: SIGNIFICANT CHANGE UP
-  TRIMETHOPRIM/SULFAMETHOXAZOLE: SIGNIFICANT CHANGE UP
ALBUMIN SERPL ELPH-MCNC: 2.1 G/DL — LOW (ref 3.3–5)
ALBUMIN SERPL ELPH-MCNC: 2.2 G/DL — LOW (ref 3.3–5)
ALBUMIN SERPL ELPH-MCNC: 2.6 G/DL — LOW (ref 3.3–5)
ALP SERPL-CCNC: 105 U/L — SIGNIFICANT CHANGE UP (ref 40–120)
ALP SERPL-CCNC: 293 U/L — HIGH (ref 40–120)
ALP SERPL-CCNC: 92 U/L — SIGNIFICANT CHANGE UP (ref 40–120)
ALT FLD-CCNC: 11 U/L — SIGNIFICANT CHANGE UP (ref 10–45)
ALT FLD-CCNC: 5 U/L — LOW (ref 10–45)
ALT FLD-CCNC: 5 U/L — LOW (ref 10–45)
ANION GAP SERPL CALC-SCNC: 16 MMOL/L — SIGNIFICANT CHANGE UP (ref 5–17)
ANION GAP SERPL CALC-SCNC: 18 MMOL/L — HIGH (ref 5–17)
ANION GAP SERPL CALC-SCNC: 19 MMOL/L — HIGH (ref 5–17)
APTT BLD: 36.9 SEC — HIGH (ref 27.5–36.3)
APTT BLD: 57.3 SEC — HIGH (ref 27.5–36.3)
APTT BLD: 63.5 SEC — HIGH (ref 27.5–36.3)
APTT BLD: 98.9 SEC — HIGH (ref 27.5–36.3)
AST SERPL-CCNC: 10 U/L — SIGNIFICANT CHANGE UP (ref 10–40)
AST SERPL-CCNC: 77 U/L — HIGH (ref 10–40)
AST SERPL-CCNC: 8 U/L — LOW (ref 10–40)
BILIRUB DIRECT SERPL-MCNC: 0.5 MG/DL — HIGH (ref 0–0.2)
BILIRUB DIRECT SERPL-MCNC: 0.5 MG/DL — HIGH (ref 0–0.2)
BILIRUB DIRECT SERPL-MCNC: 1 MG/DL — HIGH (ref 0–0.2)
BILIRUB INDIRECT FLD-MCNC: 0.3 MG/DL — SIGNIFICANT CHANGE UP (ref 0.2–1)
BILIRUB INDIRECT FLD-MCNC: 0.3 MG/DL — SIGNIFICANT CHANGE UP (ref 0.2–1)
BILIRUB INDIRECT FLD-MCNC: 0.5 MG/DL — SIGNIFICANT CHANGE UP (ref 0.2–1)
BILIRUB SERPL-MCNC: 0.8 MG/DL — SIGNIFICANT CHANGE UP (ref 0.2–1.2)
BILIRUB SERPL-MCNC: 0.8 MG/DL — SIGNIFICANT CHANGE UP (ref 0.2–1.2)
BILIRUB SERPL-MCNC: 1.5 MG/DL — HIGH (ref 0.2–1.2)
BLD GP AB SCN SERPL QL: NEGATIVE — SIGNIFICANT CHANGE UP
BUN SERPL-MCNC: 24 MG/DL — HIGH (ref 7–23)
BUN SERPL-MCNC: 29 MG/DL — HIGH (ref 7–23)
BUN SERPL-MCNC: 36 MG/DL — HIGH (ref 7–23)
CALCIUM SERPL-MCNC: 7.4 MG/DL — LOW (ref 8.4–10.5)
CALCIUM SERPL-MCNC: 7.8 MG/DL — LOW (ref 8.4–10.5)
CALCIUM SERPL-MCNC: 8.2 MG/DL — LOW (ref 8.4–10.5)
CHLORIDE SERPL-SCNC: 94 MMOL/L — LOW (ref 96–108)
CHLORIDE SERPL-SCNC: 96 MMOL/L — SIGNIFICANT CHANGE UP (ref 96–108)
CHLORIDE SERPL-SCNC: 96 MMOL/L — SIGNIFICANT CHANGE UP (ref 96–108)
CK MB BLD-MCNC: 5.6 % — HIGH (ref 0–3.5)
CK MB CFR SERPL CALC: 6.5 NG/ML — SIGNIFICANT CHANGE UP (ref 0–6.7)
CK SERPL-CCNC: 117 U/L — SIGNIFICANT CHANGE UP (ref 30–200)
CO2 SERPL-SCNC: 20 MMOL/L — LOW (ref 22–31)
CO2 SERPL-SCNC: 22 MMOL/L — SIGNIFICANT CHANGE UP (ref 22–31)
CO2 SERPL-SCNC: 23 MMOL/L — SIGNIFICANT CHANGE UP (ref 22–31)
CREAT SERPL-MCNC: 5.18 MG/DL — HIGH (ref 0.5–1.3)
CREAT SERPL-MCNC: 5.79 MG/DL — HIGH (ref 0.5–1.3)
CREAT SERPL-MCNC: 5.82 MG/DL — HIGH (ref 0.5–1.3)
GAS PNL BLDA: SIGNIFICANT CHANGE UP
GLUCOSE SERPL-MCNC: 110 MG/DL — HIGH (ref 70–99)
GLUCOSE SERPL-MCNC: 112 MG/DL — HIGH (ref 70–99)
GLUCOSE SERPL-MCNC: 211 MG/DL — HIGH (ref 70–99)
HBA1C BLD-MCNC: 5.2 % — SIGNIFICANT CHANGE UP (ref 4–5.6)
HCT VFR BLD CALC: 25.1 % — LOW (ref 39–50)
HCT VFR BLD CALC: 25.5 % — LOW (ref 39–50)
HCT VFR BLD CALC: 25.8 % — LOW (ref 39–50)
HCT VFR BLD CALC: 26 % — LOW (ref 39–50)
HCT VFR BLD CALC: 26.6 % — LOW (ref 39–50)
HCT VFR BLD CALC: 28.8 % — LOW (ref 39–50)
HGB BLD-MCNC: 8.4 G/DL — LOW (ref 13–17)
HGB BLD-MCNC: 8.7 G/DL — LOW (ref 13–17)
HGB BLD-MCNC: 8.7 G/DL — LOW (ref 13–17)
HGB BLD-MCNC: 8.8 G/DL — LOW (ref 13–17)
HGB BLD-MCNC: 9.1 G/DL — LOW (ref 13–17)
HGB BLD-MCNC: 9.7 G/DL — LOW (ref 13–17)
INR BLD: 1.03 RATIO — SIGNIFICANT CHANGE UP (ref 0.88–1.16)
INR BLD: 1.05 RATIO — SIGNIFICANT CHANGE UP (ref 0.88–1.16)
INR BLD: 1.12 RATIO — SIGNIFICANT CHANGE UP (ref 0.88–1.16)
MAGNESIUM SERPL-MCNC: 2.3 MG/DL — SIGNIFICANT CHANGE UP (ref 1.6–2.6)
MAGNESIUM SERPL-MCNC: 2.5 MG/DL — SIGNIFICANT CHANGE UP (ref 1.6–2.6)
MAGNESIUM SERPL-MCNC: 2.9 MG/DL — HIGH (ref 1.6–2.6)
MCHC RBC-ENTMCNC: 30.1 PG — SIGNIFICANT CHANGE UP (ref 27–34)
MCHC RBC-ENTMCNC: 30.4 PG — SIGNIFICANT CHANGE UP (ref 27–34)
MCHC RBC-ENTMCNC: 30.7 PG — SIGNIFICANT CHANGE UP (ref 27–34)
MCHC RBC-ENTMCNC: 30.9 PG — SIGNIFICANT CHANGE UP (ref 27–34)
MCHC RBC-ENTMCNC: 33.5 GM/DL — SIGNIFICANT CHANGE UP (ref 32–36)
MCHC RBC-ENTMCNC: 33.5 GM/DL — SIGNIFICANT CHANGE UP (ref 32–36)
MCHC RBC-ENTMCNC: 33.7 GM/DL — SIGNIFICANT CHANGE UP (ref 32–36)
MCHC RBC-ENTMCNC: 33.7 GM/DL — SIGNIFICANT CHANGE UP (ref 32–36)
MCHC RBC-ENTMCNC: 34.2 GM/DL — SIGNIFICANT CHANGE UP (ref 32–36)
MCHC RBC-ENTMCNC: 34.5 GM/DL — SIGNIFICANT CHANGE UP (ref 32–36)
MCV RBC AUTO: 89.3 FL — SIGNIFICANT CHANGE UP (ref 80–100)
MCV RBC AUTO: 89.5 FL — SIGNIFICANT CHANGE UP (ref 80–100)
MCV RBC AUTO: 89.9 FL — SIGNIFICANT CHANGE UP (ref 80–100)
MCV RBC AUTO: 90 FL — SIGNIFICANT CHANGE UP (ref 80–100)
MCV RBC AUTO: 90 FL — SIGNIFICANT CHANGE UP (ref 80–100)
MCV RBC AUTO: 90.3 FL — SIGNIFICANT CHANGE UP (ref 80–100)
METHOD TYPE: SIGNIFICANT CHANGE UP
METHOD TYPE: SIGNIFICANT CHANGE UP
NRBC # BLD: 0 /100 WBCS — SIGNIFICANT CHANGE UP (ref 0–0)
PHOSPHATE SERPL-MCNC: 6.2 MG/DL — HIGH (ref 2.5–4.5)
PHOSPHATE SERPL-MCNC: 6.4 MG/DL — HIGH (ref 2.5–4.5)
PHOSPHATE SERPL-MCNC: 7.3 MG/DL — HIGH (ref 2.5–4.5)
PLATELET # BLD AUTO: 353 K/UL — SIGNIFICANT CHANGE UP (ref 150–400)
PLATELET # BLD AUTO: 384 K/UL — SIGNIFICANT CHANGE UP (ref 150–400)
PLATELET # BLD AUTO: 387 K/UL — SIGNIFICANT CHANGE UP (ref 150–400)
PLATELET # BLD AUTO: 390 K/UL — SIGNIFICANT CHANGE UP (ref 150–400)
PLATELET # BLD AUTO: 398 K/UL — SIGNIFICANT CHANGE UP (ref 150–400)
PLATELET # BLD AUTO: 450 K/UL — HIGH (ref 150–400)
POTASSIUM SERPL-MCNC: 4.7 MMOL/L — SIGNIFICANT CHANGE UP (ref 3.5–5.3)
POTASSIUM SERPL-MCNC: 4.8 MMOL/L — SIGNIFICANT CHANGE UP (ref 3.5–5.3)
POTASSIUM SERPL-MCNC: 5.1 MMOL/L — SIGNIFICANT CHANGE UP (ref 3.5–5.3)
POTASSIUM SERPL-SCNC: 4.7 MMOL/L — SIGNIFICANT CHANGE UP (ref 3.5–5.3)
POTASSIUM SERPL-SCNC: 4.8 MMOL/L — SIGNIFICANT CHANGE UP (ref 3.5–5.3)
POTASSIUM SERPL-SCNC: 5.1 MMOL/L — SIGNIFICANT CHANGE UP (ref 3.5–5.3)
PROT SERPL-MCNC: 4.8 G/DL — LOW (ref 6–8.3)
PROT SERPL-MCNC: 4.9 G/DL — LOW (ref 6–8.3)
PROT SERPL-MCNC: 5.5 G/DL — LOW (ref 6–8.3)
PROTHROM AB SERPL-ACNC: 11.7 SEC — SIGNIFICANT CHANGE UP (ref 10–12.9)
PROTHROM AB SERPL-ACNC: 12.1 SEC — SIGNIFICANT CHANGE UP (ref 10–12.9)
PROTHROM AB SERPL-ACNC: 12.8 SEC — SIGNIFICANT CHANGE UP (ref 10–12.9)
RBC # BLD: 2.79 M/UL — LOW (ref 4.2–5.8)
RBC # BLD: 2.85 M/UL — LOW (ref 4.2–5.8)
RBC # BLD: 2.89 M/UL — LOW (ref 4.2–5.8)
RBC # BLD: 2.89 M/UL — LOW (ref 4.2–5.8)
RBC # BLD: 2.96 M/UL — LOW (ref 4.2–5.8)
RBC # BLD: 3.19 M/UL — LOW (ref 4.2–5.8)
RBC # FLD: 15.9 % — HIGH (ref 10.3–14.5)
RBC # FLD: 15.9 % — HIGH (ref 10.3–14.5)
RBC # FLD: 16.1 % — HIGH (ref 10.3–14.5)
RBC # FLD: 16.1 % — HIGH (ref 10.3–14.5)
RBC # FLD: 16.3 % — HIGH (ref 10.3–14.5)
RBC # FLD: 16.4 % — HIGH (ref 10.3–14.5)
RH IG SCN BLD-IMP: POSITIVE — SIGNIFICANT CHANGE UP
SODIUM SERPL-SCNC: 133 MMOL/L — LOW (ref 135–145)
SODIUM SERPL-SCNC: 135 MMOL/L — SIGNIFICANT CHANGE UP (ref 135–145)
SODIUM SERPL-SCNC: 136 MMOL/L — SIGNIFICANT CHANGE UP (ref 135–145)
TROPONIN T, HIGH SENSITIVITY RESULT: 633 NG/L — HIGH (ref 0–51)
WBC # BLD: 12.57 K/UL — HIGH (ref 3.8–10.5)
WBC # BLD: 13.99 K/UL — HIGH (ref 3.8–10.5)
WBC # BLD: 15.26 K/UL — HIGH (ref 3.8–10.5)
WBC # BLD: 15.26 K/UL — HIGH (ref 3.8–10.5)
WBC # BLD: 15.73 K/UL — HIGH (ref 3.8–10.5)
WBC # BLD: 21.04 K/UL — HIGH (ref 3.8–10.5)
WBC # FLD AUTO: 12.57 K/UL — HIGH (ref 3.8–10.5)
WBC # FLD AUTO: 13.99 K/UL — HIGH (ref 3.8–10.5)
WBC # FLD AUTO: 15.26 K/UL — HIGH (ref 3.8–10.5)
WBC # FLD AUTO: 15.26 K/UL — HIGH (ref 3.8–10.5)
WBC # FLD AUTO: 15.73 K/UL — HIGH (ref 3.8–10.5)
WBC # FLD AUTO: 21.04 K/UL — HIGH (ref 3.8–10.5)

## 2020-01-13 PROCEDURE — 71275 CT ANGIOGRAPHY CHEST: CPT | Mod: 26

## 2020-01-13 PROCEDURE — 99232 SBSQ HOSP IP/OBS MODERATE 35: CPT

## 2020-01-13 PROCEDURE — 99292 CRITICAL CARE ADDL 30 MIN: CPT | Mod: 25

## 2020-01-13 PROCEDURE — 74177 CT ABD & PELVIS W/CONTRAST: CPT | Mod: 26

## 2020-01-13 PROCEDURE — 99291 CRITICAL CARE FIRST HOUR: CPT | Mod: 25

## 2020-01-13 PROCEDURE — 99291 CRITICAL CARE FIRST HOUR: CPT

## 2020-01-13 PROCEDURE — 36620 INSERTION CATHETER ARTERY: CPT

## 2020-01-13 PROCEDURE — 71045 X-RAY EXAM CHEST 1 VIEW: CPT | Mod: 26

## 2020-01-13 PROCEDURE — 93306 TTE W/DOPPLER COMPLETE: CPT | Mod: 26

## 2020-01-13 PROCEDURE — 93010 ELECTROCARDIOGRAM REPORT: CPT

## 2020-01-13 RX ORDER — FENTANYL CITRATE 50 UG/ML
0.5 INJECTION INTRAVENOUS
Qty: 5000 | Refills: 0 | Status: DISCONTINUED | OUTPATIENT
Start: 2020-01-13 | End: 2020-01-15

## 2020-01-13 RX ORDER — HEPARIN SODIUM 5000 [USP'U]/ML
850 INJECTION INTRAVENOUS; SUBCUTANEOUS
Qty: 25000 | Refills: 0 | Status: DISCONTINUED | OUTPATIENT
Start: 2020-01-13 | End: 2020-01-13

## 2020-01-13 RX ORDER — HYDROMORPHONE HYDROCHLORIDE 2 MG/ML
0.5 INJECTION INTRAMUSCULAR; INTRAVENOUS; SUBCUTANEOUS
Refills: 0 | Status: DISCONTINUED | OUTPATIENT
Start: 2020-01-13 | End: 2020-01-18

## 2020-01-13 RX ORDER — HEPARIN SODIUM 5000 [USP'U]/ML
1000 INJECTION INTRAVENOUS; SUBCUTANEOUS
Qty: 25000 | Refills: 0 | Status: DISCONTINUED | OUTPATIENT
Start: 2020-01-13 | End: 2020-01-23

## 2020-01-13 RX ORDER — SODIUM CHLORIDE 9 MG/ML
1000 INJECTION INTRAMUSCULAR; INTRAVENOUS; SUBCUTANEOUS
Refills: 0 | Status: DISCONTINUED | OUTPATIENT
Start: 2020-01-13 | End: 2020-01-15

## 2020-01-13 RX ORDER — MIDAZOLAM HYDROCHLORIDE 1 MG/ML
2 INJECTION, SOLUTION INTRAMUSCULAR; INTRAVENOUS ONCE
Refills: 0 | Status: DISCONTINUED | OUTPATIENT
Start: 2020-01-13 | End: 2020-01-13

## 2020-01-13 RX ORDER — HEPARIN SODIUM 5000 [USP'U]/ML
1000 INJECTION INTRAVENOUS; SUBCUTANEOUS ONCE
Refills: 0 | Status: COMPLETED | OUTPATIENT
Start: 2020-01-13 | End: 2020-01-13

## 2020-01-13 RX ORDER — AMIODARONE HYDROCHLORIDE 400 MG/1
0.5 TABLET ORAL
Qty: 900 | Refills: 0 | Status: DISCONTINUED | OUTPATIENT
Start: 2020-01-14 | End: 2020-01-16

## 2020-01-13 RX ORDER — HEPARIN SODIUM 5000 [USP'U]/ML
2000 INJECTION INTRAVENOUS; SUBCUTANEOUS ONCE
Refills: 0 | Status: COMPLETED | OUTPATIENT
Start: 2020-01-13 | End: 2020-01-13

## 2020-01-13 RX ORDER — CHLORHEXIDINE GLUCONATE 213 G/1000ML
15 SOLUTION TOPICAL
Refills: 0 | Status: DISCONTINUED | OUTPATIENT
Start: 2020-01-13 | End: 2020-01-16

## 2020-01-13 RX ORDER — PANTOPRAZOLE SODIUM 20 MG/1
40 TABLET, DELAYED RELEASE ORAL DAILY
Refills: 0 | Status: DISCONTINUED | OUTPATIENT
Start: 2020-01-13 | End: 2020-01-18

## 2020-01-13 RX ORDER — SEVELAMER CARBONATE 2400 MG/1
800 POWDER, FOR SUSPENSION ORAL
Refills: 0 | Status: DISCONTINUED | OUTPATIENT
Start: 2020-01-13 | End: 2020-01-13

## 2020-01-13 RX ORDER — CHLORHEXIDINE GLUCONATE 213 G/1000ML
15 SOLUTION TOPICAL EVERY 12 HOURS
Refills: 0 | Status: DISCONTINUED | OUTPATIENT
Start: 2020-01-13 | End: 2020-01-13

## 2020-01-13 RX ORDER — NOREPINEPHRINE BITARTRATE/D5W 8 MG/250ML
0.02 PLASTIC BAG, INJECTION (ML) INTRAVENOUS
Qty: 8 | Refills: 0 | Status: DISCONTINUED | OUTPATIENT
Start: 2020-01-13 | End: 2020-01-14

## 2020-01-13 RX ORDER — AMIODARONE HYDROCHLORIDE 400 MG/1
1 TABLET ORAL
Qty: 900 | Refills: 0 | Status: DISCONTINUED | OUTPATIENT
Start: 2020-01-13 | End: 2020-01-14

## 2020-01-13 RX ORDER — ALBUMIN HUMAN 25 %
500 VIAL (ML) INTRAVENOUS ONCE
Refills: 0 | Status: COMPLETED | OUTPATIENT
Start: 2020-01-13 | End: 2020-01-13

## 2020-01-13 RX ADMIN — FLUCONAZOLE 100 MILLIGRAM(S): 150 TABLET ORAL at 05:33

## 2020-01-13 RX ADMIN — BRIMONIDINE TARTRATE 1 DROP(S): 2 SOLUTION/ DROPS OPHTHALMIC at 05:34

## 2020-01-13 RX ADMIN — CHLORHEXIDINE GLUCONATE 1 APPLICATION(S): 213 SOLUTION TOPICAL at 05:33

## 2020-01-13 RX ADMIN — BRIMONIDINE TARTRATE 1 DROP(S): 2 SOLUTION/ DROPS OPHTHALMIC at 13:50

## 2020-01-13 RX ADMIN — MIDAZOLAM HYDROCHLORIDE 2 MILLIGRAM(S): 1 INJECTION, SOLUTION INTRAMUSCULAR; INTRAVENOUS at 18:14

## 2020-01-13 RX ADMIN — HEPARIN SODIUM 10 UNIT(S)/HR: 5000 INJECTION INTRAVENOUS; SUBCUTANEOUS at 20:56

## 2020-01-13 RX ADMIN — HYDROMORPHONE HYDROCHLORIDE 0.5 MILLIGRAM(S): 2 INJECTION INTRAMUSCULAR; INTRAVENOUS; SUBCUTANEOUS at 22:45

## 2020-01-13 RX ADMIN — HYDROMORPHONE HYDROCHLORIDE 0.5 MILLIGRAM(S): 2 INJECTION INTRAMUSCULAR; INTRAVENOUS; SUBCUTANEOUS at 14:05

## 2020-01-13 RX ADMIN — Medication 2.5 MILLIGRAM(S): at 12:21

## 2020-01-13 RX ADMIN — HEPARIN SODIUM 8.5 UNIT(S)/HR: 5000 INJECTION INTRAVENOUS; SUBCUTANEOUS at 07:32

## 2020-01-13 RX ADMIN — MEROPENEM 100 MILLIGRAM(S): 1 INJECTION INTRAVENOUS at 20:57

## 2020-01-13 RX ADMIN — HYDROMORPHONE HYDROCHLORIDE 0.5 MILLIGRAM(S): 2 INJECTION INTRAMUSCULAR; INTRAVENOUS; SUBCUTANEOUS at 13:50

## 2020-01-13 RX ADMIN — POLYETHYLENE GLYCOL 3350 17 GRAM(S): 17 POWDER, FOR SOLUTION ORAL at 12:21

## 2020-01-13 RX ADMIN — HEPARIN SODIUM 8.5 UNIT(S)/HR: 5000 INJECTION INTRAVENOUS; SUBCUTANEOUS at 19:14

## 2020-01-13 RX ADMIN — HYDROMORPHONE HYDROCHLORIDE 0.5 MILLIGRAM(S): 2 INJECTION INTRAMUSCULAR; INTRAVENOUS; SUBCUTANEOUS at 22:30

## 2020-01-13 RX ADMIN — Medication 200 MILLIGRAM(S): at 10:46

## 2020-01-13 RX ADMIN — Medication 250 MILLILITER(S): at 22:24

## 2020-01-13 RX ADMIN — Medication 1 MG/KG/HR: at 22:23

## 2020-01-13 RX ADMIN — MIDAZOLAM HYDROCHLORIDE 2 MILLIGRAM(S): 1 INJECTION, SOLUTION INTRAMUSCULAR; INTRAVENOUS at 20:56

## 2020-01-13 RX ADMIN — Medication 4 MILLIGRAM(S): at 05:33

## 2020-01-13 RX ADMIN — HEPARIN SODIUM 2000 UNIT(S): 5000 INJECTION INTRAVENOUS; SUBCUTANEOUS at 19:16

## 2020-01-13 RX ADMIN — BRIMONIDINE TARTRATE 1 DROP(S): 2 SOLUTION/ DROPS OPHTHALMIC at 20:57

## 2020-01-13 RX ADMIN — LATANOPROST 1 DROP(S): 0.05 SOLUTION/ DROPS OPHTHALMIC; TOPICAL at 12:22

## 2020-01-13 RX ADMIN — MIDAZOLAM HYDROCHLORIDE 2 MILLIGRAM(S): 1 INJECTION, SOLUTION INTRAMUSCULAR; INTRAVENOUS at 19:00

## 2020-01-13 RX ADMIN — HEPARIN SODIUM 1000 UNIT(S): 5000 INJECTION INTRAVENOUS; SUBCUTANEOUS at 20:56

## 2020-01-13 RX ADMIN — FENTANYL CITRATE 1.59 MICROGRAM(S)/KG/HR: 50 INJECTION INTRAVENOUS at 19:14

## 2020-01-13 NOTE — PROGRESS NOTE ADULT - PROBLEM SELECTOR PLAN 2
- Persistent but improved leukocytosis s/p transplant nephrectomy.   - continue prednisone to 5 mg daily - Persistent but improved leukocytosis s/p transplant nephrectomy.   - Decrease prednisone to 2.5 mg daily

## 2020-01-13 NOTE — PROGRESS NOTE ADULT - ATTENDING COMMENTS
49 yr old man with ESRD s/p DDRT in Dec 2019 complicated by DGF due to ATN and early TMA thought to be due to prograf. Was on belatacept/MMF/Pred immunosuppression, still requiring dialysis.   He was admitted with influenza, course complicated by perinephric hematoma complicated by infection (culture +ve for enterobacter)  requiring exploration and wash out. He developed DVT started on heparin drip, subsequent gross hematuria. He was taken to the OR and found to have dehiscence of the ureter-bladder anastomosis as well as vascular anastomosis. He underwent graft nephrectomy on 1/10/20 with bovine biopatch repair of the EIA.   He remains on antibiotics with meropenem, cipro and diflucan.   Doing well post op. No new complaints. Anuric. Last HD done on 1/11/20.  On exam - lungs clear, abdomen with RLQ wound + VAC, LE edema b/l R>L, palpable pedal pulses b/l, LUE AVF + thrill  Labs reviewed     Plan  - HD tomorrow per TTS schedule.   - Epo with dialysis   - Decrease prednisone to 2.5mg daily

## 2020-01-13 NOTE — PROGRESS NOTE ADULT - SUBJECTIVE AND OBJECTIVE BOX
Follow Up:  infected hematoma    Interval History: no chills or fevers. no n/v/d. notes pain at the right abdomen around drain.     REVIEW OF SYSTEMS  [  ] ROS unobtainable because:    [  ] All other systems negative except as noted below    Constitutional:  [ ] fever [ ] chills  [ ] weight loss  [ ] weakness  Skin:  [ ] rash [ ] phlebitis	  Eyes: [ ] icterus [ ] pain  [ ] discharge	  ENMT: [ ] sore throat  [ ] thrush [ ] ulcers [ ] exudates  Respiratory: [ ] dyspnea [ ] hemoptysis [ ] cough [ ] sputum	  Cardiovascular:  [ ] chest pain [ ] palpitations [ ] edema	  Gastrointestinal:  [ ] nausea [ ] vomiting [ ] diarrhea [ ] constipation [ ] pain	  Genitourinary:  [ ] dysuria [ ] frequency [ ] hematuria [ ] discharge [ ] flank pain  [ ] incontinence  Musculoskeletal:  [ ] myalgias [ ] arthralgias [ ] arthritis  [ ] back pain  Neurological:  [ ] headache [ ] seizures  [ ] confusion/altered mental status    Allergies  coconut (Anaphylaxis)  morphine (Pruritus; Rash)        ANTIMICROBIALS:  ciprofloxacin   IVPB 400 every 24 hours  fluconAZOLE IVPB 400 every 24 hours  meropenem  IVPB 500 every 24 hours      OTHER MEDS:  MEDICATIONS  (STANDING):  fentaNYL   Infusion. 0.5 <Continuous>  heparin  Infusion 850 <Continuous>  HYDROmorphone  Injectable 0.5 every 2 hours PRN      Vital Signs Last 24 Hrs  T(C): 37 (13 Jan 2020 15:00), Max: 37 (13 Jan 2020 15:00)  T(F): 98.6 (13 Jan 2020 15:00), Max: 98.6 (13 Jan 2020 15:00)  HR: 108 (13 Jan 2020 19:00) (54 - 108)  BP: 128/69 (13 Jan 2020 07:00) (128/69 - 128/69)  BP(mean): 93 (13 Jan 2020 07:00) (93 - 93)  RR: 16 (13 Jan 2020 19:00) (8 - 84)  SpO2: 100% (13 Jan 2020 19:00) (88% - 100%)    PHYSICAL EXAMINATION:  General: Alert and Awake, NAD  HEENT: PERRL, EOMI  Neck: Supple  Cardiac: RRR, No M/R/G  Resp: CTAB, No Wh/Rh/Ra  Abdomen: +RLQ wound vac over transplant nephrectomy site, NBS, tenderness to palpation over RLQ, No HSM, No rigidity or guarding  MSK: No LE edema. No Calf tenderness  : No martinez  Skin: No rashes or lesions. Skin is warm and dry to the touch.   Neuro: Alert and Awake. CN 2-12 Grossly intact. Moves all four extremities spontaneously.  Psych: Calm, Pleasant, Cooperative                          9.7    13.99 )-----------( 450      ( 13 Jan 2020 18:20 )             28.8       01-13    133<L>  |  94<L>  |  29<H>  ----------------------------<  110<H>  5.1   |  20<L>  |  5.82<H>    Ca    8.2<L>      13 Jan 2020 18:20  Phos  6.2     01-13  Mg     2.5     01-13    TPro  5.5<L>  /  Alb  2.6<L>  /  TBili  0.8  /  DBili  0.5<H>  /  AST  10  /  ALT  5<L>  /  AlkPhos  105  01-13          MICROBIOLOGY:  v  .Tissue Other  01-10-20   No growth  --  Enterobacter cloacae (Carbapenem Resistant)      .Blood Blood-Venous  01-07-20   No growth at 5 days.  --  --      .Urine Catheterized  01-07-20   <10,000 CFU/mL Normal Urogenital Gillian  --  --      Abdominal Fl Abdominal Fluid  01-06-20   No growth at 5 days  --    No polymorphonuclear leukocytes seen  No organisms seen  by cytocentrifuge      .Urine Clean Catch (Midstream)  01-05-20   >100,000 CFU/ml Enterobacter cloacae  --  Enterobacter cloacae      .Blood Blood  01-04-20   No growth at 5 days.  --  --      .Tissue Other  01-01-20   Numerous Enterobacter cloacae complex  --  Enterobacter cloacae complex      .Surgical Swab collection around right kidney  01-01-20   Moderate Enterobacter cloacae complex  --  Enterobacter cloacae complex      .Body Fluid periphrenic collection  01-01-20   **Please Note**: This is a Corrected Report**  Moderate Enterobacter cloacae complex  Moderate Escherichia coli  Previously reported as:  Moderate Enterobacter cloacae (Carbapenem Resistant)  Moderate Escherichia coli  --  Escherichia coli  Enterobacter cloacae complex      .Surgical Swab right kidney abscess  01-01-20   Moderate Enterobacter cloacae complex  --  Enterobacter cloacae complex      .Urine Clean Catch (Midstream)  12-29-19   >100,000 CFU/ml Enterobacter cloacae  --  Enterobacter cloacae      .Blood Blood-Peripheral  12-27-19   No growth at 5 days.  --  --                RADIOLOGY:    <The imaging below has been reviewed and visualized me independently>    EXAM:  XR CHEST PORTABLE ROUTINE 1V                        PROCEDURE DATE:  01/12/2020    Minimal right mid to lower lung linear or subsegmental atelectasis. No pneumothorax. Left upper rib deformities as on the prior study. Old left clavicular fracture as on the prior study.

## 2020-01-13 NOTE — AIRWAY PLACEMENT NOTE ADULT - DISPOSITION AFTER INTUBATION:
patient placed on mechanical ventilation
Full range of motion of upper and lower extremities, no joint tenderness/swelling.

## 2020-01-13 NOTE — PROGRESS NOTE ADULT - SUBJECTIVE AND OBJECTIVE BOX
[facility] - Melissa Kelsey, Enrique, Maximino, Marin, Ravi Goodson  Office Number:  [phone]    Patient resting comfortably in bed in NAD.  Laying flat with no respiratory distress.  No complaints of chest pain, dyspnea, palpitations, PND, or orthopnea.  Got one unit PRBC overnight    F/U for:  NSVT    Telemetry:  SR and SB    MEDICATIONS  (STANDING):  brimonidine 0.2% Ophthalmic Solution 1 Drop(s) Both EYES three times a day  chlorhexidine 2% Cloths 1 Application(s) Topical <User Schedule>  ciprofloxacin   IVPB 400 milliGRAM(s) IV Intermittent every 24 hours  fluconAZOLE IVPB 400 milliGRAM(s) IV Intermittent every 24 hours  heparin  Infusion 950 Unit(s)/Hr (8.5 mL/Hr) IV Continuous <Continuous>  hydrALAZINE Injectable 10 milliGRAM(s) IV Push once  latanoprost 0.005% Ophthalmic Solution 1 Drop(s) Both EYES daily  meropenem  IVPB 500 milliGRAM(s) IV Intermittent every 24 hours  methylPREDNISolone sodium succinate Injectable 4 milliGRAM(s) IV Push daily  polyethylene glycol 3350 17 Gram(s) Oral daily    MEDICATIONS  (PRN):  bisacodyl Suppository 10 milliGRAM(s) Rectal daily PRN Constipation  HYDROmorphone  Injectable 0.5 milliGRAM(s) IV Push every 2 hours PRN Moderate Pain (4 - 6)  traMADol 50 milliGRAM(s) Oral every 12 hours PRN Severe Pain (7 - 10)      Allergies    coconut (Anaphylaxis)  morphine (Pruritus; Rash)        Vital Signs Last 24 Hrs  T(C): 36.6 (13 Jan 2020 03:00), Max: 37.5 (12 Jan 2020 07:00)  T(F): 97.9 (13 Jan 2020 03:00), Max: 99.5 (12 Jan 2020 07:00)  HR: 60 (13 Jan 2020 06:00) (55 - 79)  BP: 148/69 (12 Jan 2020 07:00) (148/69 - 148/69)  BP(mean): 99 (12 Jan 2020 07:00) (99 - 99)  RR: 16 (13 Jan 2020 06:00) (10 - 23)  SpO2: 99% (13 Jan 2020 06:00) (99% - 100%)    I&O's Summary    11 Jan 2020 07:01  -  12 Jan 2020 07:00  --------------------------------------------------------  IN: 1303.9 mL / OUT: 3180 mL / NET: -1876.1 mL    12 Jan 2020 07:01  -  13 Jan 2020 06:09  --------------------------------------------------------  IN: 982 mL / OUT: 105 mL / NET: 877 mL        ON EXAM:    Constitutional: NAD, awake    HEENT: Moist Mucous Membranes, Anicteric  Pulmonary: Decreased breath sounds b/l. No rales, crackles or wheeze appreciated.   Cardiovascular: Regular, S1 and S2, No murmurs, rubs, gallops or clicks  Gastrointestinal: Bowel Sounds present, soft, nontender.   Lymph: + peripheral edema. No lymphadenopathy.  Skin: No visible rashes or ulcers.  Psych:  Mood & affect appropriate for situation      LABS: All Labs Reviewed:                        8.7    15.26 )-----------( 384      ( 13 Jan 2020 03:08 )             26.0                         8.7    15.73 )-----------( 387      ( 13 Jan 2020 00:31 )             25.8                         7.3    14.60 )-----------( 391      ( 12 Jan 2020 22:16 )             21.6     13 Jan 2020 00:31    136    |  96     |  24     ----------------------------<  112    4.8     |  22     |  5.18   12 Jan 2020 03:09    139    |  99     |  18     ----------------------------<  84     4.0     |  23     |  4.15   11 Jan 2020 17:23    138    |  100    |  13     ----------------------------<  109    4.1     |  23     |  3.21     Ca    7.8        13 Jan 2020 00:31  Ca    8.1        12 Jan 2020 03:09  Ca    8.0        11 Jan 2020 17:23  Phos  6.4       13 Jan 2020 00:31  Phos  4.2       12 Jan 2020 03:09  Phos  4.0       11 Jan 2020 17:23  Mg     2.3       13 Jan 2020 00:31  Mg     2.2       12 Jan 2020 03:09  Mg     2.1       11 Jan 2020 17:23    TPro  4.9    /  Alb  2.2    /  TBili  0.8    /  DBili  0.5    /  AST  8      /  ALT  5      /  AlkPhos  92     13 Jan 2020 00:31  TPro  4.9    /  Alb  2.2    /  TBili  1.1    /  DBili  0.6    /  AST  12     /  ALT  <5     /  AlkPhos  95     12 Jan 2020 03:09  TPro  4.0    /  Alb  1.7    /  TBili  0.8    /  DBili  0.6    /  AST  8      /  ALT  5      /  AlkPhos  83     11 Jan 2020 04:59    PT/INR - ( 13 Jan 2020 00:31 )   PT: 12.1 sec;   INR: 1.05 ratio         PTT - ( 13 Jan 2020 03:08 )  PTT:63.5 sec      Blood Culture: Organism --  Gram Stain Blood -- Gram Stain   No polymorphonuclear cells seen per low power field  No organisms seen per oil power field  Specimen Source .Tissue Other  Culture-Blood --

## 2020-01-13 NOTE — PROGRESS NOTE ADULT - ATTENDING COMMENTS
Patient seen and examined on AM rounds.   No acute events overnight.   hemodynamically stable  Respiratory state is stable on room air with SpO2 100%  ESRD - will plan to get HD today as per transplant nephrology  Prednisone to be decreased to 2.5 mg  Tolerating regular diet  Will continue heparin drip with goal PTT around 60  patient to get oob to chair  Patient to continue to be monitored closely

## 2020-01-13 NOTE — PROGRESS NOTE ADULT - ASSESSMENT
49M PMHx of ESRD on HD  ( via LUE AVF), HTN, Gout, Glaucoma, NET of pancreas, RA with hand contractures. s/p DDRT 12/8/19,  Post op course c/b DGF requiring HD. Renal bx x 2 c/w profound ATN.     Re-admitted with Influenza and perinephric hematoma. s/p hematoma evacuation/ abd washout and renal biopsy. Post op course c/b leukocytosis, infected hematoma, acute RLE DVT, and bleeding pseudoaneurysm at txp renal artery anastomosis. Now s/p graft nephrectomy.     [] DDRT c/b DGF/ATN/perinephric infected hematoma/pseudoneurysm/graft nephrectomy   - off immunosupression, decrease prednisone to 2.5mg QD and will plan to DC soon  - off  valcyte/bactrim  - HD per nephrology as scheduled  - ID following: cont zora/cipro/fluc. f/u OR cx from 1/10 (growing enterobacter), received amikacin 1/7 and 1/8   - advance diet as  tolerated  - pain control  - bowel regimen  - PT/OT/OOB  - Wound vac change (Monday)    [] New acute DVT of R external iliac, common femoral, femoral veins, R soleal vein requiring heparin drip  - IVC filter placed 1/9  - Vascular following: continue heparin gtt, change APTT goal to 50-60    [] Dispo  - continue SICU care

## 2020-01-13 NOTE — PROVIDER CONTACT NOTE (CHANGE IN STATUS NOTIFICATION) - SITUATION
Patient put back in bed from chair when he began labored breathing, non responsive, heart rate in the 20's. When we were about to code the pt, he became alert and heart rate normalized. Same episode happened again a few minutes later.

## 2020-01-13 NOTE — PROGRESS NOTE ADULT - ATTENDING COMMENTS
s/p DDRT 12/8/19, now s/p right transplant nephrectomy POD# 4 for ruptured pseudoaneurysm of renal artery anastomosis.   external iliac artery stent removed at time of surgery. Still has IVC filter  Extubated 2 days ago.  RLE swollen. +palpable pulse. Cont heparin gtt  Immunosuppression - decrease prednisone to 2.5mg daily.   Cont abx and diflucan, f/u cultures

## 2020-01-13 NOTE — CONSULT NOTE ADULT - SUBJECTIVE AND OBJECTIVE BOX
CHIEF COMPLAINT:     HPI:  49y Male ESRD on HD  (via LUE AVF), HTN, Gout, Glaucoma, PNET s/p robotic distal panc/splenectomy (at [place] 2015 by Dr. Young, followed by Dr. Raphael, [facility] Oncologist), RA with hand contractures. He underwent DDRT on 12/8/19 (ureteral stent sutured to Martinez - removed 12/15). Post op course c/b DGF requiring HD, thrombocytopenia, elevated LDH and Haptoglobulin. Schistocytes  on peripheral smear concerning for TMA. Envarsus held. Received Belatacept 12/13. Started on PLEX (12/12, 12/15). Underwent renal bx on 12/13 c/w profound ATN.  Found to have much improvement to levels, likely related to Tacrolimus. He was discharged home on 12/20/19 w/ outpatient HD.     He was now sent to ED by dialysis center after he was found to be febrile to 102, he did not get HD this AM, and last full HD session was on 12/26. Upon arrival, he was febrile to 101.7, other vitals were stable. He states that he had a cough that began 5-6 days ago that has now mostly resolved. He reports no other febrile episodes other than this morning. He admits to having one SOB episode earlier this week that resolved after he got HD. He also states that his mother was hospitalized earlier this week and was flu+. Currently makes less than 1 cup urine/daily. He currently denies SOB, CP, dyspnea, HA, dizziness, N/V, diarrhea, constipation.     1/1/2020 was taken to OR and found to have a perinephric hematoma. Underwent washout, evacuation of hematoma, and biopsy of transplanted kidney. He was recovering on the floor when overnight he spiked a fever to 38.1 and had 18 beats of wide complex tachycardia. He was also found to have worsening leukocytosis and became acutely hypotensive. A CT scan was obtained demonstrating a 4.5x3.7 perinephric collection. IR was consulted for drainage and SICU was consulted for hemodynamic monitoring.    On 1/13, patient had an episode sudden bradycardia. Episode resolved spontaneously, however patient then arrested with multiple episodes of torsades. Patient was coded for a total of ~20 mins and required shocks, epis and intubation by anesthesia. TTE obtained which revealed septal flattening consistent with right ventricular overload. Right ventricular enlargement  with decreased right ventricular systolic function, Crystal's sign which is suggestive of pulmonary embolism and moderate pulmonary hypertension. MICU consulted for PERT         Recipient info:   CPRA:    0%  ABO:      A+  CMVAb:  Positive  EBVIgG Status:  Positive  Last HD:  12/7/2019    Donor ID:  [id number]  Match: [id number]  OPO: NYRT  Age:  49  ABO:  A  KDPI 81%  COD:  Anoxia  X Clamp Time:  12/7/2019 1538.  Medical Hx: DM, HTN, HLD, obesity BMI 53, CAD, CABAGX3  Terminal Cr:  1/1.8/1.7  CMV- Neg EBV- Neg    OR:    DDRT to right iliac fossa. Simulect induction. Two arteries anastomosed to a single cuff on the back table. One vein, one ureter. Ureteral anastomosis performed over a double J stent, which was sutured to the martinez.   Cold ischemia time 25.5 hours. (27 Dec 2019 10:45)      FAMILY HISTORY:  Family history of myocardial infarction in first degree male relative  Family history of rheumatoid arthritis  Family history of hypertension in mother  Family history of cerebral aneurysm (Sibling)      Allergies    coconut (Anaphylaxis)  morphine (Pruritus; Rash)    Intolerances        HOME MEDICATIONS:    REVIEW OF SYSTEMS:  Could not be obtained as patient is intubated and paralyzed       OBJECTIVE:  ICU Vital Signs Last 24 Hrs  T(C): 37 (13 Jan 2020 15:00), Max: 37 (13 Jan 2020 15:00)  T(F): 98.6 (13 Jan 2020 15:00), Max: 98.6 (13 Jan 2020 15:00)  HR: 108 (13 Jan 2020 19:00) (54 - 108)  BP: 128/69 (13 Jan 2020 07:00) (128/69 - 128/69)  BP(mean): 93 (13 Jan 2020 07:00) (93 - 93)  ABP: 136/79 (13 Jan 2020 19:00) (66/48 - 155/70)  ABP(mean): 100 (13 Jan 2020 19:00) (40 - 109)  RR: 16 (13 Jan 2020 19:00) (8 - 84)  SpO2: 100% (13 Jan 2020 19:00) (88% - 100%)    Mode: AC/ CMV (Assist Control/ Continuous Mandatory Ventilation), RR (machine): 16, TV (machine): 450, FiO2: 100, PEEP: 5, ITime: 0.9, MAP: 10, PIP: 29    01-12 @ 07:01 - 01-13 @ 07:00  --------------------------------------------------------  IN: 1182 mL / OUT: 105 mL / NET: 1077 mL    01-13 @ 07:01 - 01-13 @ 20:12  --------------------------------------------------------  IN: 85 mL / OUT: 0 mL / NET: 85 mL      CAPILLARY BLOOD GLUCOSE          PHYSICAL EXAM:  GENERAL: intubated and paralyzed   HEAD:  Atraumatic, Normocephalic  EYES: EOMI, PERRLA, conjunctiva and sclera clear  NECK: Supple, No JVD  CHEST/LUNG: course bilateral breat sounds   HEART: Tachycardic Regular rhythm; No murmurs, rubs, or gallops  ABDOMEN: Soft,, Nondistended. Wound vac in place over the right deandre  EXTREMITIES:  2+ Peripheral Pulses, 4+ RLE pitting edema. 3+ LLE pitting edema   NEUROLOGY: non-focal      HOSPITAL MEDICATIONS:  MEDICATIONS  (STANDING):  brimonidine 0.2% Ophthalmic Solution 1 Drop(s) Both EYES three times a day  chlorhexidine 0.12% Liquid 15 milliLiter(s) Oral Mucosa every 12 hours  chlorhexidine 2% Cloths 1 Application(s) Topical <User Schedule>  ciprofloxacin   IVPB 400 milliGRAM(s) IV Intermittent every 24 hours  fentaNYL   Infusion. 0.5 MICROgram(s)/kG/Hr (1.587 mL/Hr) IV Continuous <Continuous>  fluconAZOLE IVPB 400 milliGRAM(s) IV Intermittent every 24 hours  heparin  Infusion 850 Unit(s)/Hr (8.5 mL/Hr) IV Continuous <Continuous>  latanoprost 0.005% Ophthalmic Solution 1 Drop(s) Both EYES daily  meropenem  IVPB 500 milliGRAM(s) IV Intermittent every 24 hours    MEDICATIONS  (PRN):  HYDROmorphone  Injectable 0.5 milliGRAM(s) IV Push every 2 hours PRN Moderate Pain (4 - 6)      LABS:                        9.7    13.99 )-----------( 450      ( 13 Jan 2020 18:20 )             28.8     01-13    133<L>  |  94<L>  |  29<H>  ----------------------------<  110<H>  5.1   |  20<L>  |  5.82<H>    Ca    8.2<L>      13 Jan 2020 18:20  Phos  6.2     01-13  Mg     2.5     01-13    TPro  5.5<L>  /  Alb  2.6<L>  /  TBili  0.8  /  DBili  0.5<H>  /  AST  10  /  ALT  5<L>  /  AlkPhos  105  01-13    PT/INR - ( 13 Jan 2020 18:20 )   PT: 11.7 sec;   INR: 1.03 ratio         PTT - ( 13 Jan 2020 18:20 )  PTT:36.9 sec    Arterial Blood Gas:  01-13 @ 19:32  7.25/61/460/26/100/-1.8  ABG lactate: --  Arterial Blood Gas:  01-13 @ 18:18  7.42/36/64/23/94/-1.0  ABG lactate: --        MICROBIOLOGY:     RADIOLOGY:  [ ] Reviewed and interpreted by me    EKG: CHIEF COMPLAINT:     HPI:  49y Male ESRD on HD  (via LUE AVF), HTN, Gout, Glaucoma, PNET s/p robotic distal panc/splenectomy (at [place] 2015 by Dr. Young, followed by Dr. Raphael, [facility] Oncologist), RA with hand contractures. 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 Post op course c/b DGF requiring HD, thrombocytopenia, elevated LDH and Haptoglobulin. Schistocytes  on peripheral smear concerning for TMA. Envarsus held. Received Belatacept 12/13. Started on PLEX (12/12, 12/15). Underwent renal bx on 12/13 c/w profound ATN.  Found to have much improvement to levels, likely related to Tacrolimus. He was discharged home on 12/20/19 w/ outpatient HD.     He was now sent to ED by dialysis center after he was found to be febrile to 102, he did not get HD this AM, and last full HD session was on 12/26. Upon arrival, he was febrile to 101.7, other vitals were stable. He states that he had a cough that began 5-6 days ago that has now mostly resolved. He reports no other febrile episodes other than this morning. He admits to having one SOB episode earlier this week that resolved after he got HD. He also states that his mother was hospitalized earlier this week and was flu+. Currently makes less than 1 cup urine/daily. He currently denies SOB, CP, dyspnea, HA, dizziness, N/V, diarrhea, constipation.     1/1/2020 was taken to OR and found to have a perinephric hematoma. Underwent washout, evacuation of hematoma, and biopsy of transplanted kidney. He was recovering on the floor when overnight he spiked a fever to 38.1 and had 18 beats of wide complex tachycardia. He was also found to have worsening leukocytosis and became acutely hypotensive. A CT scan was obtained demonstrating a 4.5x3.7 perinephric collection. IR was consulted for drainage and SICU was consulted for hemodynamic monitoring.    On 1/13, patient had an episode sudden bradycardia. Episode resolved spontaneously, however patient then arrested with multiple episodes of torsades. Patient was coded for a total of ~20 mins and required shocks, epis and intubation by anesthesia. TTE obtained which revealed septal flattening consistent with right ventricular overload. Right ventricular enlargement  with decreased right ventricular systolic function, Crystal's sign which is suggestive of pulmonary embolism and moderate pulmonary hypertension. MICU consulted for PERT     PMH/PSH: as above    FAMILY HISTORY:  Family history of myocardial infarction in first degree male relative  Family history of rheumatoid arthritis  Family history of hypertension in mother  Family history of cerebral aneurysm (Sibling)    SocHx:  unable to obtain, pt is intubated and paralyzed    Allergies  coconut (Anaphylaxis)  morphine (Pruritus; Rash)    REVIEW OF SYSTEMS:  Could not be obtained as patient is intubated and paralyzed       OBJECTIVE:  ICU Vital Signs Last 24 Hrs  T(C): 37 (13 Jan 2020 15:00), Max: 37 (13 Jan 2020 15:00)  T(F): 98.6 (13 Jan 2020 15:00), Max: 98.6 (13 Jan 2020 15:00)  HR: 108 (13 Jan 2020 19:00) (54 - 108)  BP: 128/69 (13 Jan 2020 07:00) (128/69 - 128/69)  BP(mean): 93 (13 Jan 2020 07:00) (93 - 93)  ABP: 136/79 (13 Jan 2020 19:00) (66/48 - 155/70)  ABP(mean): 100 (13 Jan 2020 19:00) (40 - 109)  RR: 16 (13 Jan 2020 19:00) (8 - 84)  SpO2: 100% (13 Jan 2020 19:00) (88% - 100%)    Mode: AC/ CMV (Assist Control/ Continuous Mandatory Ventilation), RR (machine): 16, TV (machine): 450, FiO2: 100, PEEP: 5, ITime: 0.9, MAP: 10, PIP: 29    01-12 @ 07:01 - 01-13 @ 07:00  --------------------------------------------------------  IN: 1182 mL / OUT: 105 mL / NET: 1077 mL    01-13 @ 07:01 - 01-13 @ 20:12  --------------------------------------------------------  IN: 85 mL / OUT: 0 mL / NET: 85 mL      PHYSICAL EXAM:  GENERAL: intubated and paralyzed   HEAD:  Atraumatic, Normocephalic  EYES: EOMI, PERRLA, conjunctiva and sclera clear  NECK: Supple, No JVD  CHEST/LUNG: course bilateral breat sounds   HEART: Tachycardic Regular rhythm; No murmurs, rubs, or gallops  ABDOMEN: Soft,, Nondistended. Wound vac in place over the right groin draining serosanguinous fluid; Kennedy draining scant dark blood  EXTREMITIES:  2+ Peripheral Pulses, 4+ RLE pitting edema. 3+ LLE pitting edema   NEUROLOGY: non-focal      HOSPITAL MEDICATIONS:  MEDICATIONS  (STANDING):  brimonidine 0.2% Ophthalmic Solution 1 Drop(s) Both EYES three times a day  chlorhexidine 0.12% Liquid 15 milliLiter(s) Oral Mucosa every 12 hours  chlorhexidine 2% Cloths 1 Application(s) Topical <User Schedule>  ciprofloxacin   IVPB 400 milliGRAM(s) IV Intermittent every 24 hours  fentaNYL   Infusion. 0.5 MICROgram(s)/kG/Hr (1.587 mL/Hr) IV Continuous <Continuous>  fluconAZOLE IVPB 400 milliGRAM(s) IV Intermittent every 24 hours  heparin  Infusion 850 Unit(s)/Hr (8.5 mL/Hr) IV Continuous <Continuous>  latanoprost 0.005% Ophthalmic Solution 1 Drop(s) Both EYES daily  meropenem  IVPB 500 milliGRAM(s) IV Intermittent every 24 hours    MEDICATIONS  (PRN):  HYDROmorphone  Injectable 0.5 milliGRAM(s) IV Push every 2 hours PRN Moderate Pain (4 - 6)      LABS:                        9.7    13.99 )-----------( 450      ( 13 Jan 2020 18:20 )             28.8     01-13    133<L>  |  94<L>  |  29<H>  ----------------------------<  110<H>  5.1   |  20<L>  |  5.82<H>    Ca    8.2<L>      13 Jan 2020 18:20  Phos  6.2     01-13  Mg     2.5     01-13    TPro  5.5<L>  /  Alb  2.6<L>  /  TBili  0.8  /  DBili  0.5<H>  /  AST  10  /  ALT  5<L>  /  AlkPhos  105  01-13    PT/INR - ( 13 Jan 2020 18:20 )   PT: 11.7 sec;   INR: 1.03 ratio         PTT - ( 13 Jan 2020 18:20 )  PTT:36.9 sec    Arterial Blood Gas:  01-13 @ 19:32  7.25/61/460/26/100/-1.8  ABG lactate: --  Arterial Blood Gas:  01-13 @ 18:18  7.42/36/64/23/94/-1.0  ABG lactate: --    RADIOLOGY:  [ ] Reviewed and interpreted by me

## 2020-01-13 NOTE — PROGRESS NOTE ADULT - SUBJECTIVE AND OBJECTIVE BOX
Subjective    Seen & examined at bedside  No acute events overnight  Feeling stronger    Objective    Vital signs  T(F): , Max: 99.3 (01-12-20 @ 11:00)  HR: 59 (01-13-20 @ 07:00)  BP: 128/69 (01-13-20 @ 07:00)  SpO2: 100% (01-13-20 @ 07:00)  Wt(kg): --    Output     01-12 @ 07:01  -  01-13 @ 07:00  --------------------------------------------------------  IN: 1182 mL / OUT: 105 mL / NET: 1077 mL    Physical Exam  Gen NAD  Abd soft, appropriately tender, incision c/d/i   3 way martinez secured, draining clear maroon colored urine    Labs  01-13 @ 03:08  WBC 15.26 / Hct 26.0  / SCr --       01-13 @ 00:31  WBC 15.73 / Hct 25.8  / SCr 5.18     Urine Cx: Culture - Urine (01.07.20 @ 00:18)    Specimen Source: .Urine Catheterized    Culture Results:   <10,000 CFU/mL Normal Urogenital Gillian    Blood Cx: Culture - Blood (01.07.20 @ 13:23)    Specimen Source: .Blood Blood-Venous    Culture Results:   No growth at 5 days.    Imaging    < from: US Trans Kidney w/ Doppler, Right (01.09.20 @ 10:37) >  IMPRESSION:     No evidence of thrombus within the transplant renal vein or external iliac vein.    Partially visualized hypoechoic area adjacent to the right kidney transplant may represent previously visualized perinephric hematoma. A pelvic CT can be performed to fully evaluate.    < end of copied text >

## 2020-01-13 NOTE — PROGRESS NOTE ADULT - PROBLEM SELECTOR PLAN 1
s/p DDRT (12/8/19) now s/p transplant nephrectomy.  Pt. will continue maintenance dialysis as per nephrology team.

## 2020-01-13 NOTE — CHART NOTE - NSCHARTNOTEFT_GEN_A_CORE
Called stat to the ICU for a 49yoM in severe respiratory arrest or full code. Pt easily intubated with a glide scope 3 and 7.5 F et tube. +cords, +BS bilat, +ETCO2. Tube taped at 23 cm /62,

## 2020-01-13 NOTE — PROGRESS NOTE ADULT - SUBJECTIVE AND OBJECTIVE BOX
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 Pt. overall with complicated hospital course, including persistent hematuria, RLE DVT, and DGF. Had transplant nephrectomy along with endovascular repair and IVC filter placement on 1/10/20.    Pt. seen and examined at bedside this AM in SICU. Pt. states that he has abdominal pain from his surgery last week. Otherwise, denies CP, SOB, N/V/F/C.    PAST HISTORY  --------------------------------------------------------------------------------  No significant changes to PMH, PSH, FHx, SHx, unless otherwise noted    ALLERGIES & MEDICATIONS  --------------------------------------------------------------------------------  Allergies    coconut (Anaphylaxis)  morphine (Pruritus; Rash)    Intolerances    Standing Inpatient Medications  brimonidine 0.2% Ophthalmic Solution 1 Drop(s) Both EYES three times a day  chlorhexidine 2% Cloths 1 Application(s) Topical <User Schedule>  ciprofloxacin   IVPB 400 milliGRAM(s) IV Intermittent every 24 hours  fluconAZOLE IVPB 400 milliGRAM(s) IV Intermittent every 24 hours  heparin  Infusion 950 Unit(s)/Hr IV Continuous <Continuous>  hydrALAZINE Injectable 10 milliGRAM(s) IV Push once  latanoprost 0.005% Ophthalmic Solution 1 Drop(s) Both EYES daily  meropenem  IVPB 500 milliGRAM(s) IV Intermittent every 24 hours  methylPREDNISolone sodium succinate Injectable 4 milliGRAM(s) IV Push daily  polyethylene glycol 3350 17 Gram(s) Oral daily      REVIEW OF SYSTEMS  --------------------------------------------------------------------------------  Gen: No lethargy  Respiratory: No dyspnea  CV: No chest pain  GI: + abdominal pain (surgically related)  MSK: No LE edema  Neuro: No dizziness  Heme: No bleeding    All other systems were reviewed and are negative, except as noted.    VITALS/PHYSICAL EXAM  --------------------------------------------------------------------------------  T(C): 36.6 (01-13-20 @ 03:00), Max: 37.5 (01-12-20 @ 07:00)  HR: 60 (01-13-20 @ 06:00) (55 - 79)  BP: 148/69 (01-12-20 @ 07:00) (148/69 - 148/69)  RR: 16 (01-13-20 @ 06:00) (10 - 23)  SpO2: 99% (01-13-20 @ 06:00) (99% - 100%)  Wt(kg): --    01-11-20 @ 07:01  -  01-12-20 @ 07:00  --------------------------------------------------------  IN: 1303.9 mL / OUT: 3180 mL / NET: -1876.1 mL    01-12-20 @ 07:01  -  01-13-20 @ 06:40  --------------------------------------------------------  IN: 982 mL / OUT: 105 mL / NET: 877 mL    Physical Exam:  	Gen: NAD  	HEENT: supple neck  	Pulm: CTA B/L  	CV: RRR, S1S2; no rub  	Abd: +BS, soft, RLQ with wound vac in place                       Transplant: No tenderness, swelling  	: No suprapubic tenderness  	LE: right leg 2+ pitting edema    LABS/STUDIES  --------------------------------------------------------------------------------              8.7    15.26 >-----------<  384      [01-13-20 @ 03:08]              26.0     136  |  96  |  24  ----------------------------<  112      [01-13-20 @ 00:31]  4.8   |  22  |  5.18        Ca     7.8     [01-13-20 @ 00:31]      Mg     2.3     [01-13-20 @ 00:31]      Phos  6.4     [01-13-20 @ 00:31]    Creatinine Trend:  SCr 5.18 [01-13 @ 00:31]  SCr 4.15 [01-12 @ 03:09]  SCr 3.21 [01-11 @ 17:23]  SCr 5.58 [01-11 @ 11:28]  SCr 5.36 [01-11 @ 04:59]

## 2020-01-13 NOTE — PROGRESS NOTE ADULT - ASSESSMENT
Assessment	  KAYLA PA is a 49y Male with ESRD s/p DDRT on 12/8/19 with course complicated by delayed graft function requiring HD, and infected perinephric hematoma s/p washout on 1/1/2020 with worsening sepsis and transfer to ICU, s/p IR drainage of perinephric collection on 1/6. 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 He underwent operative repair of right external iliac artery with pericardial patch, transplant nephrectomy, RLE thrombectomy, and IVC filter on 1/9.     Plan   - per transplant: wants to hold CT for today   - cleared to start Eliquis from vascular surgery stand point

## 2020-01-13 NOTE — CONSULT NOTE ADULT - SUBJECTIVE AND OBJECTIVE BOX
Patient seen and evaluated at bedside    Chief Complaint: intubated, s/p cardiac arrest    HPI:  49M PMHx of ESRD on HD  ( via LUE AVF), HTN, Gout, Glaucoma, NET of pancreas (s/p robotic distal panc/splenectomy at [place] 2015 by Dr. Young, followed by Dr. Raphael, [facility] Oncologist), RA with hand contractures. 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 Post op course c/b graft failure requiring HD, thrombocytopenia, elevated LDH and Haptoglobulin. Schistocytes  on peripheral smear concerning for TMA. Envarsus held. Received Belatacept 12/13. Started on PLEX (12/12, 12/15). Underwent renal bx on 12/13 c/w profound ATN.  Found to have much improvement to levels, likely related to Tacrolimus. He was discharged home on 12/20/19 w/ outpatient HD.     Admitted for fevers, and worsening renal failure.  Found to have perinephric hematoma and 1/1/2020 was taken to OR and found to have a perinephric hematoma. Underwent washout, evacuation of hematoma, and biopsy of transplanted kidney. Sent to SICU.  IR consulted for drainage of his reaccumulating hematoma.  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  He was started on heparin gtt, This evening (1/13)  had episode of bradycardia, Vfib, and cardiac arrest requiring intubation, shock and epi.  He initially was on levophed and this was discontinued.  TTE was done (official report pending) that was concerning with RV failure and signs of PE.  Heparin gtt initially held during arrest but now restarted.    PMHx:   Erectile dysfunction  Glaucoma  Gout  HTN (hypertension)  ESRD (end stage renal disease) on dialysis  Contracture of finger joint  Carpal tunnel syndrome  Brain cyst  Anemia  Renal insufficiency  Gastric ulcer  Rheumatoid arthritis      PSHx:   Renal transplant recipient  Breakdown (mechanical) of penile (implanted) prosthesis, sequela  End-stage renal disease (ESRD)  Abscess of left buttock  AV fistula  S/P cystoscopy  s/p Lt Carpal tunnel  Rheumatoid arthritis      Allergies:  coconut (Anaphylaxis)  morphine (Pruritus; Rash)      Home Meds: see chart    Current Medications:   brimonidine 0.2% Ophthalmic Solution 1 Drop(s) Both EYES three times a day  chlorhexidine 0.12% Liquid 15 milliLiter(s) Oral Mucosa every 12 hours  chlorhexidine 2% Cloths 1 Application(s) Topical <User Schedule>  ciprofloxacin   IVPB 400 milliGRAM(s) IV Intermittent every 24 hours  fentaNYL   Infusion. 0.5 MICROgram(s)/kG/Hr IV Continuous <Continuous>  fluconAZOLE IVPB 400 milliGRAM(s) IV Intermittent every 24 hours  heparin  Infusion 850 Unit(s)/Hr IV Continuous <Continuous>  HYDROmorphone  Injectable 0.5 milliGRAM(s) IV Push every 2 hours PRN  latanoprost 0.005% Ophthalmic Solution 1 Drop(s) Both EYES daily  meropenem  IVPB 500 milliGRAM(s) IV Intermittent every 24 hours      FAMILY HISTORY:  Family history of myocardial infarction in first degree male relative  Family history of rheumatoid arthritis  Family history of hypertension in mother  Family history of cerebral aneurysm (Sibling)      Social History:  unable to obtain    REVIEW OF SYSTEMS:  unable to obtain    Physical Exam:  T(F): 98.6 (01-13), Max: 98.6 (01-13)  HR: 108 (01-13) (54 - 108)  BP: 128/69 (01-13) (128/69 - 128/69)  RR: 16 (01-13)  SpO2: 100% (01-13)  GENERAL: cachectic male, intubated  HEAD:  Atraumatic, Normocephalic  ENT: PERRLA  CHEST/LUNG: decreased in bases, has conduction of mechanical ventilation sounds  BACK: No spinal tenderness  HEART: Regular rate and rhythm, systolic murmur  ABDOMEN: abdomen covered in bandages  EXTREMITIES:  significant bilateral edema, worse on the right  PSYCH: unable to assess, sedated  NEUROLOGY: sedated  SKIN: Normal color, No rashes or lesions    Cardiovascular Diagnostic Testing:    ECG:    Echo: 1/13  Conclusions:  1. Normal left ventricular internal dimensions and wall  thicknesses.  2. Hyperdynamic left ventricular systolic function. Septal  flattening consistent with right ventricular overload.  3. Right ventricular enlargement (4.5 cm at the base) with  decreased right ventricular systolic function. Crystal's  signs is present, which is may be suggestive of pulmonary  embolism. TAPSE = 1.4 cm.  4. Estimated right ventricular systolic pressure equals >  61.24 mm Hg, assuming right atrial pressure equals > 15 mm  Hg, consistent with moderate pulmonary hypertension.  5. Normal tricuspid valve. Mild-moderate tricuspid  regurgitation.  *** No previous Echo exam.    Imaging:    CXR: 1/12  Minimal right mid to lower lung linear or subsegmental atelectasis. No   pneumothorax. Left upper rib deformities as on the prior study. Old left   clavicular fracture as on the prior study.       Labs: Personally reviewed                        9.7    13.99 )-----------( 450      ( 13 Jan 2020 18:20 )             28.8     01-13    133<L>  |  94<L>  |  29<H>  ----------------------------<  110<H>  5.1   |  20<L>  |  5.82<H>    Ca    8.2<L>      13 Jan 2020 18:20  Phos  6.2     01-13  Mg     2.5     01-13    TPro  5.5<L>  /  Alb  2.6<L>  /  TBili  0.8  /  DBili  0.5<H>  /  AST  10  /  ALT  5<L>  /  AlkPhos  105  01-13    PT/INR - ( 13 Jan 2020 18:20 )   PT: 11.7 sec;   INR: 1.03 ratio         PTT - ( 13 Jan 2020 18:20 )  PTT:36.9 sec    CARDIAC MARKERS ( 10 [place] 2020 04:40 )  x     / x     / x     / 58 U/L / x     / x          Assessment and plan:    49M PMHx of ESRD on HD  ( via LUE AVF), HTN, Gout, Glaucoma, NET of pancreas (s/p robotic distal panc/splenectomy at [place] 2015 by Dr. Young, followed by Dr. Raphael, [facility] Oncologist), RA with hand contractures. He underwent Renal Transplant on 12/8/19 that has been complicated by graft failure requiring HD.     He was admitted for fevers, and worsening renal failure.  Hospital course complicated by external iliac artery pseudoaneurysm, renal graft infection/failure and extensive iliac vein thrombosis taken to OR on 1/10 for transplant nephrectomy, mechanical thrombectomy, covered stent in external iliac artery, IVC filter placement and wound drainage.  He was started on heparin gtt.  Had cardiac arrest today likely in the setting of hypoxia and RV strain from PE, heparin only therapeutic for one day.    Cardiac Arrest, presumed due to PE  - unclear etiology, patient is now much more stable, weaned off levophed, however still requiring significant o2 support  - TTE showing sings of RV enlargement, Crystal's sign, and very suggestive of PE despite ivc filter  - discussed case with [facility] interventional Attending Dr Duran, patient had major surgery 2 days ago and not safe to use thrombolytics at this time or catheter directed thrombolysis  - get CTA to uncover thrombus burden, continue on heparin gtt  - if showing decompensation may need CT surgery evaluation for Pulmonary artery thrombectomy    For all Cardiology service contact information, go to [email or web address] and use "[email or web address]" to login. Patient seen and evaluated at bedside    Chief Complaint: intubated, s/p cardiac arrest    HPI:  49M PMHx of ESRD on HD  ( via LUE AVF), HTN, Gout, Glaucoma, NET of pancreas (s/p robotic distal panc/splenectomy at [place] 2015 by Dr. Young, followed by Dr. Raphael, [facility] [place] Oncologist), RA with hand contractures. 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 Post op course c/b graft failure requiring HD, thrombocytopenia, elevated LDH and Haptoglobulin. Schistocytes  on peripheral smear concerning for TMA. Envarsus held. Received Belatacept 12/13. Started on PLEX (12/12, 12/15). Underwent renal bx on 12/13 c/w profound ATN.  Found to have much improvement to levels, likely related to Tacrolimus. He was discharged home on 12/20/19 w/ outpatient HD.     Admitted for fevers, and worsening renal failure.  Found to have perinephric hematoma and 1/1/2020 was taken to OR and found to have a perinephric hematoma. Underwent washout, evacuation of hematoma, and biopsy of transplanted kidney. Sent to SICU.  IR consulted for drainage of his reaccumulating hematoma.  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  He was started on heparin gtt, This evening (1/13)  had episode of bradycardia, Vfib, and cardiac arrest requiring intubation, shock and epi.  He initially was on levophed and this was discontinued.  TTE was done (official report pending) that was concerning with RV failure and signs of PE.  Heparin gtt initially held during arrest but now restarted.    PMHx:   Erectile dysfunction  Glaucoma  Gout  HTN (hypertension)  ESRD (end stage renal disease) on dialysis  Contracture of finger joint  Carpal tunnel syndrome  Brain cyst  Anemia  Renal insufficiency  Gastric ulcer  Rheumatoid arthritis      PSHx:   Renal transplant recipient  Breakdown (mechanical) of penile (implanted) prosthesis, sequela  End-stage renal disease (ESRD)  Abscess of left buttock  AV fistula  S/P cystoscopy  s/p Lt Carpal tunnel  Rheumatoid arthritis      Allergies:  coconut (Anaphylaxis)  morphine (Pruritus; Rash)      Home Meds: see chart    Current Medications:   brimonidine 0.2% Ophthalmic Solution 1 Drop(s) Both EYES three times a day  chlorhexidine 0.12% Liquid 15 milliLiter(s) Oral Mucosa every 12 hours  chlorhexidine 2% Cloths 1 Application(s) Topical <User Schedule>  ciprofloxacin   IVPB 400 milliGRAM(s) IV Intermittent every 24 hours  fentaNYL   Infusion. 0.5 MICROgram(s)/kG/Hr IV Continuous <Continuous>  fluconAZOLE IVPB 400 milliGRAM(s) IV Intermittent every 24 hours  heparin  Infusion 850 Unit(s)/Hr IV Continuous <Continuous>  HYDROmorphone  Injectable 0.5 milliGRAM(s) IV Push every 2 hours PRN  latanoprost 0.005% Ophthalmic Solution 1 Drop(s) Both EYES daily  meropenem  IVPB 500 milliGRAM(s) IV Intermittent every 24 hours      FAMILY HISTORY:  Family history of myocardial infarction in first degree male relative  Family history of rheumatoid arthritis  Family history of hypertension in mother  Family history of cerebral aneurysm (Sibling)      Social History:  unable to obtain    REVIEW OF SYSTEMS:  unable to obtain    Physical Exam:  T(F): 98.6 (01-13), Max: 98.6 (01-13)  HR: 108 (01-13) (54 - 108)  BP: 128/69 (01-13) (128/69 - 128/69)  RR: 16 (01-13)  SpO2: 100% (01-13)  GENERAL: cachectic male, intubated  HEAD:  Atraumatic, Normocephalic  ENT: PERRLA  CHEST/LUNG: decreased in bases, has conduction of mechanical ventilation sounds  BACK: No spinal tenderness  HEART: Regular rate and rhythm, systolic murmur  ABDOMEN: abdomen covered in bandages  EXTREMITIES:  significant bilateral edema, worse on the right  PSYCH: unable to assess, sedated  NEUROLOGY: sedated  SKIN: Normal color, No rashes or lesions    Cardiovascular Diagnostic Testing:    ECG:    Echo: 1/13  Conclusions:  1. Normal left ventricular internal dimensions and wall  thicknesses.  2. Hyperdynamic left ventricular systolic function. Septal  flattening consistent with right ventricular overload.  3. Right ventricular enlargement (4.5 cm at the base) with  decreased right ventricular systolic function. Crystal's  signs is present, which is may be suggestive of pulmonary  embolism. TAPSE = 1.4 cm.  4. Estimated right ventricular systolic pressure equals >  61.24 mm Hg, assuming right atrial pressure equals > 15 mm  Hg, consistent with moderate pulmonary hypertension.  5. Normal tricuspid valve. Mild-moderate tricuspid  regurgitation.  *** No previous Echo exam.    Imaging:    CXR: 1/12  Minimal right mid to lower lung linear or subsegmental atelectasis. No   pneumothorax. Left upper rib deformities as on the prior study. Old left   clavicular fracture as on the prior study.       Labs: Personally reviewed                        9.7    13.99 )-----------( 450      ( 13 Jan 2020 18:20 )             28.8     01-13    133<L>  |  94<L>  |  29<H>  ----------------------------<  110<H>  5.1   |  20<L>  |  5.82<H>    Ca    8.2<L>      13 Jan 2020 18:20  Phos  6.2     01-13  Mg     2.5     01-13    TPro  5.5<L>  /  Alb  2.6<L>  /  TBili  0.8  /  DBili  0.5<H>  /  AST  10  /  ALT  5<L>  /  AlkPhos  105  01-13    PT/INR - ( 13 Jan 2020 18:20 )   PT: 11.7 sec;   INR: 1.03 ratio         PTT - ( 13 Jan 2020 18:20 )  PTT:36.9 sec    CARDIAC MARKERS ( 10 [place] 2020 04:40 )  x     / x     / x     / 58 U/L / x     / x          Assessment and plan:    49M PMHx of ESRD on HD  ( via LUE AVF), HTN, Gout, Glaucoma, NET of pancreas (s/p robotic distal panc/splenectomy at [place] 2015 by Dr. Young, followed by Dr. Raphael, [facility] [place] Oncologist), RA with hand contractures. He underwent Renal Transplant on 12/8/19 that has been complicated by graft failure requiring HD.     He was admitted for fevers, and worsening renal failure.  Hospital course complicated by external iliac artery pseudoaneurysm, renal graft infection/failure and extensive iliac vein thrombosis taken to OR on 1/10 for transplant nephrectomy, mechanical thrombectomy, covered stent in external iliac artery, IVC filter placement and wound drainage.  He was started on heparin gtt.  Had cardiac arrest today likely in the setting of hypoxia and RV strain from PE, heparin only therapeutic for one day.    Cardiac Arrest, presumed due to PE  - unclear etiology, patient is now much more stable, weaned off levophed, however still requiring significant o2 support  - TTE showing sings of RV enlargement, Crystal's sign, and very suggestive of PE despite ivc filter  - discussed case with [facility] interventional Attending Dr Duran, patient had major surgery 2 days ago, currently with blood in urine and active drainage with blood in wound vac, currently not safe to use thrombolytics at this time or catheter directed thrombolysis  - get CTA to uncover thrombus burden, continue on heparin gtt  - if showing decompensation may need CT surgery evaluation for Pulmonary artery thrombectomy  - would discuss with family goals of care given multiorgan failure and guarded prognosis    For all Cardiology service contact information, go to [email or web address] and use "[email or web address]" to login.

## 2020-01-13 NOTE — PROGRESS NOTE ADULT - SUBJECTIVE AND OBJECTIVE BOX
Surgery Progress Note  Patient is a 49y old  Male who presents with a chief complaint of Fever (13 Jan 2020 06:39)      SUBJECTIVE: Patient seen and examined at bedside with surgical team, patient without complaints.   Palpable DP pulse.     24 HOUR EVENTS:   - Patient IV locked  - Transfused one unit of PRBC overnight   - No overnight events.     Vital Signs Last 24 Hrs  T(C): 36.9 (13 Jan 2020 07:00), Max: 37.4 (12 Jan 2020 11:00)  T(F): 98.4 (13 Jan 2020 07:00), Max: 99.3 (12 Jan 2020 11:00)  HR: 59 (13 Jan 2020 07:00) (55 - 79)  BP: 128/69 (13 Jan 2020 07:00) (128/69 - 128/69)  BP(mean): 93 (13 Jan 2020 07:00) (93 - 93)  RR: 12 (13 Jan 2020 07:00) (10 - 23)  SpO2: 100% (13 Jan 2020 07:00) (99% - 100%)    Physical Exam  Constitutional: NAD  Respiratory: breathing comfortably on RA  Abd: soft, distended, RL abdominal wound vac in place with appropriate suction   Ext: right LE edema resolving     I&O's Detail    12 Jan 2020 07:01  -  13 Jan 2020 07:00  --------------------------------------------------------  IN:    dextrose 5% + sodium chloride 0.45%.: 60 mL    heparin Infusion: 80 mL    heparin Infusion: 142 mL    Instillation: 100 mL    Packed Red Blood Cells: 350 mL    Solution: 450 mL  Total IN: 1182 mL    OUT:    Indwelling Catheter - Urethral: 5 mL    Instillation: 100 mL  Total OUT: 105 mL    Total NET: 1077 mL      MEDICATIONS  (STANDING):  brimonidine 0.2% Ophthalmic Solution 1 Drop(s) Both EYES three times a day  chlorhexidine 2% Cloths 1 Application(s) Topical <User Schedule>  ciprofloxacin   IVPB 400 milliGRAM(s) IV Intermittent every 24 hours  fluconAZOLE IVPB 400 milliGRAM(s) IV Intermittent every 24 hours  heparin  Infusion 950 Unit(s)/Hr (8.5 mL/Hr) IV Continuous <Continuous>  hydrALAZINE Injectable 10 milliGRAM(s) IV Push once  latanoprost 0.005% Ophthalmic Solution 1 Drop(s) Both EYES daily  meropenem  IVPB 500 milliGRAM(s) IV Intermittent every 24 hours  methylPREDNISolone sodium succinate Injectable 4 milliGRAM(s) IV Push daily  polyethylene glycol 3350 17 Gram(s) Oral daily    MEDICATIONS  (PRN):  bisacodyl Suppository 10 milliGRAM(s) Rectal daily PRN Constipation  HYDROmorphone  Injectable 0.5 milliGRAM(s) IV Push every 2 hours PRN Moderate Pain (4 - 6)  traMADol 50 milliGRAM(s) Oral every 12 hours PRN Severe Pain (7 - 10)      LABS:                        8.7    15.26 )-----------( 384      ( 13 Jan 2020 03:08 )             26.0     01-13    136  |  96  |  24<H>  ----------------------------<  112<H>  4.8   |  22  |  5.18<H>    Ca    7.8<L>      13 Jan 2020 00:31  Phos  6.4     01-13  Mg     2.3     01-13    TPro  4.9<L>  /  Alb  2.2<L>  /  TBili  0.8  /  DBili  0.5<H>  /  AST  8<L>  /  ALT  5<L>  /  AlkPhos  92  01-13    PT/INR - ( 13 Jan 2020 00:31 )   PT: 12.1 sec;   INR: 1.05 ratio         PTT - ( 13 Jan 2020 03:08 )  PTT:63.5 sec  LIVER FUNCTIONS - ( 13 Jan 2020 00:31 )  Alb: 2.2 g/dL / Pro: 4.9 g/dL / ALK PHOS: 92 U/L / ALT: 5 U/L / AST: 8 U/L / GGT: x             ABO Interpretation: CONOR (01-13-20 @ 00:38)

## 2020-01-13 NOTE — PROGRESS NOTE ADULT - ASSESSMENT
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 Post op course c/b DGF s/p renal bx on 12/13 c/b perirenal hematoma/abscess s/p washout 1/1.     Patient is s/p IR aspiration of persistent collection seen inferior to pelvic kidney, now with new-onset gross hematuria.     Patient taken to OR after IR for explantation of transplanted kidney and repair of bladder perforation. Vascular surgery called intra-op and repaired the right external iliac artery was repaired with a bovine pericardial patch, repaired the right external iliac vein, performed a thrombectomy of the right lower extremity veins    3 way martinez in place, draining scant clear maroon colored urine.  PVR yesterday 2 cc.  Urine color likely 2/2 to recent bladder surgery + initial hematuria from transplant kidney bx.  Given scant UOP, and low PVR, no contraindications to restarting AC.  Martinez likely to be discontinued soon.    - Minimal UOP  - PVR checked, 2cc  - Irrigate Martinez PRN  - Care per primary team 49M with PMH of malleable penile prosthesis, ESRD on HD s/p DDRT on 12/8/19. Post op course c/b DGF s/p renal bx on 12/13 c/b perirenal hematoma/abscess s/p washout 1/1.     Patient is s/p IR aspiration of persistent collection seen inferior to pelvic kidney, now with new-onset gross hematuria.     Patient taken to OR after IR for explantation of transplanted kidney. Vascular surgery called intra-op and repaired the right external iliac artery was repaired with a bovine pericardial patch, repaired the right external iliac vein, performed a thrombectomy of the right lower extremity veins    3 way martinez in place, draining scant clear maroon colored urine.  PVR yesterday 2 cc.  Urine color likely 2/2 to recent bladder surgery + initial hematuria from transplant kidney bx.  Given scant UOP, and low PVR, no contraindications to restarting AC.  Martinez likely to be discontinued soon.    - Urology to sign off, please re-consult as necessary.  - Minimal UOP  - PVR checked, 2cc  - Irrigate Martinez PRN  - Care per primary team

## 2020-01-13 NOTE — PROGRESS NOTE ADULT - SUBJECTIVE AND OBJECTIVE BOX
HISTORY  49y Male ESRD on HD  (via LUE AVF), HTN, Gout, Glaucoma, PNET s/p robotic distal panc/splenectomy (at [place] 2015 by Dr. Young, followed by Dr. Raphael, [facility] Oncologist), RA with hand contractures. 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 Post op course c/b DGF requiring HD, thrombocytopenia, elevated LDH and Haptoglobulin. Schistocytes  on peripheral smear concerning for TMA. Envarsus held. Received Belatacept 12/13. Started on PLEX (12/12, 12/15). Underwent renal bx on 12/13 c/w profound ATN.  Found to have much improvement to levels, likely related to Tacrolimus. He was discharged home on 12/20/19 w/ outpatient HD.     He was now sent to ED by dialysis center after he was found to be febrile to 102, he did not get HD this AM, and last full HD session was on 12/26. Upon arrival, he was febrile to 101.7, other vitals were stable. He states that he had a cough that began 5-6 days ago that has now mostly resolved. He reports no other febrile episodes other than this morning. He admits to having one SOB episode earlier this week that resolved after he got HD. He also states that his mother was hospitalized earlier this week and was flu+. Currently makes less than 1 cup urine/daily. He currently denies SOB, CP, dyspnea, HA, dizziness, N/V, diarrhea, constipation.     1/1/2020 was taken to OR and found to have a perinephric hematoma. Underwent washout, evacuation of hematoma, and biopsy of transplanted kidney. He was recovering on the floor when overnight he spiked a fever to 38.1 and had 18 beats of wide complex tachycardia. He was also found to have worsening leukocytosis and became acutely hypotensive. A CT scan was obtained demonstrating a 4.5x3.7 perinephric collection. IR was consulted for drainage and SICU was consulted for hemodynamic monitoring.          24 HOUR EVENTS:   - Patient IV locked  - Transfused one unit of PRBC overnight   - No overnight events.     SUBJECTIVE/ROS:  [ ] A ten-point review of systems was otherwise negative except as noted.  [ ] Due to altered mental status/intubation, subjective information were not able to be obtained from the patient. History was obtained, to the extent possible, from review of the chart and collateral sources of information.      NEURO  Exam: awake, alert, oriented  Meds: HYDROmorphone  Injectable 0.5 milliGRAM(s) IV Push every 2 hours PRN Moderate Pain (4 - 6)  traMADol 50 milliGRAM(s) Oral every 12 hours PRN Severe Pain (7 - 10)    [x] Adequacy of sedation and pain control has been assessed and adjusted      RESPIRATORY  RR: 17 (01-13-20 @ 00:00) (10 - 26)  SpO2: 100% (01-13-20 @ 00:00) (100% - 100%)  Exam: unlabored, clear to auscultation bilaterally  Mechanical Ventilation: none   ABG - ( 11 Jan 2020 04:43 )  pH: 7.40  /  pCO2: 36    /  pO2: 171   / HCO3: 22    / Base Excess: -1.9  /  SaO2: 99      Lactate: x      [N/A] Extubation Readiness Assessed  Meds: none       CARDIOVASCULAR  HR: 55 (01-13-20 @ 00:00) (55 - 84)  BP: 148/69 (01-12-20 @ 07:00) (148/69 - 148/69)  BP(mean): 99 (01-12-20 @ 07:00) (99 - 99)  ABP: 129/64 (01-13-20 @ 00:00) (111/55 - 161/70)  ABP(mean): 90 (01-13-20 @ 00:00) (76 - 107)  Exam: regular rate and rhythm  Cardiac Rhythm: sinus  Perfusion     [x]Adequate   [ ]Inadequate  Mentation   [x]Normal       [ ]Reduced  Extremities  [x]Warm         [ ]Cool  Volume Status [ ]Hypervolemic [x]Euvolemic [ ]Hypovolemic  Meds: hydrALAZINE Injectable 10 milliGRAM(s) IV Push once        GI/NUTRITION  Exam: soft, nontender, nondistended  Diet: Renal diet   Meds: bisacodyl Suppository 10 milliGRAM(s) Rectal daily PRN Constipation  polyethylene glycol 3350 17 Gram(s) Oral daily      GENITOURINARY  I&O's Detail    01-11 @ 07:01  -  01-12 @ 07:00  --------------------------------------------------------  IN:    dextrose 5% + sodium chloride 0.45%.: 240 mL    dextrose 5% + sodium chloride 0.9%: 90 mL    heparin Infusion: 160.5 mL    Other: 800 mL    propofol Infusion: 13.4 mL  Total IN: 1303.9 mL    OUT:    Indwelling Catheter - Urethral: 130 mL    Other: 2800 mL    VAC (Vacuum Assisted Closure) System: 250 mL  Total OUT: 3180 mL    Total NET: -1876.1 mL      01-12 @ 07:01 - 01-13 @ 01:11  --------------------------------------------------------  IN:    dextrose 5% + sodium chloride 0.45%.: 60 mL    heparin Infusion: 80 mL    heparin Infusion: 82.5 mL    Instillation: 100 mL    Packed Red Blood Cells: 350 mL    Solution: 250 mL  Total IN: 922.5 mL    OUT:    Indwelling Catheter - Urethral: 5 mL    Instillation: 100 mL  Total OUT: 105 mL    Total NET: 817.5 mL          01-13    136  |  96  |  24<H>  ----------------------------<  112<H>  4.8   |  22  |  5.18<H>    Ca    7.8<L>      13 Jan 2020 00:31  Phos  6.4     01-13  Mg     2.3     01-13    TPro  4.9<L>  /  Alb  2.2<L>  /  TBili  0.8  /  DBili  0.5<H>  /  AST  8<L>  /  ALT  5<L>  /  AlkPhos  92  01-13    [x] Kennedy catheter, indication: urine output monitoring in critically ill   Meds: none      HEMATOLOGIC  Meds: heparin  Infusion 950 Unit(s)/Hr IV Continuous <Continuous>    [x] VTE Prophylaxis                        8.7    15.73 )-----------( 387      ( 13 Jan 2020 00:31 )             25.8     PT/INR - ( 13 Jan 2020 00:31 )   PT: 12.1 sec;   INR: 1.05 ratio         PTT - ( 12 Jan 2020 21:11 )  PTT:91.0 sec  Transfusion     [ ] PRBC   [ ] Platelets   [ ] FFP   [ ] Cryoprecipitate      INFECTIOUS DISEASES  WBC Count: 15.73 K/uL (01-13 @ 00:31)  WBC Count: 14.60 K/uL (01-12 @ 22:16)  WBC Count: 20.20 K/uL (01-12 @ 03:09)    RECENT CULTURES:  Specimen Source: .Tissue Other  Date/Time: 01-10 @ 06:07  Culture Results:   No growth  Gram Stain:   No polymorphonuclear cells seen per low power field  No organisms seen per oil power field  Organism: --    Meds: ciprofloxacin   IVPB 400 milliGRAM(s) IV Intermittent every 24 hours  fluconAZOLE IVPB 400 milliGRAM(s) IV Intermittent every 24 hours  meropenem  IVPB 500 milliGRAM(s) IV Intermittent every 24 hours        ENDOCRINE  CAPILLARY BLOOD GLUCOSE        Meds: methylPREDNISolone sodium succinate Injectable 4 milliGRAM(s) IV Push daily        ACCESS DEVICES:  [x] Peripheral IV  [ ] Central Venous Line	[ ] R	[ ] L	[ ] IJ	[ ] Fem	[ ] SC	Placed:   [ ] Arterial Line		[ ] R	[ ] L	[ ] Fem	[ ] Rad	[ ] Ax	Placed:   [ ] PICC:					[ ] Mediport  [ ] Urinary Catheter, Date Placed:   [x] Necessity of urinary, arterial, and venous catheters discussed    OTHER MEDICATIONS:  brimonidine 0.2% Ophthalmic Solution 1 Drop(s) Both EYES three times a day  chlorhexidine 2% Cloths 1 Application(s) Topical <User Schedule>  latanoprost 0.005% Ophthalmic Solution 1 Drop(s) Both EYES daily      CODE STATUS: full code       IMAGING: < from: CT Head No Cont (01.09.20 @ 14:42) >  COMPARISON: None.    FINDINGS:  Prominence of the ventricles and sulci consistent with age-related cerebral volume loss. There is no intraparenchymal hematoma, mass effect or midline shift. No abnormal extra-axial fluid collections are present.     The calvarium is intact. The visualized intraorbital compartments, paranasal sinuses appear free of acute disease. Under pneumatization of the bilateral mastoid tips. There is partial opacification of the right mastoid air cells. The left mastoid air cell are grossly clear.    IMPRESSION:  No acute intracranial hemorrhage, mass effect, or other acute intracranial pathology.    < end of copied text >

## 2020-01-13 NOTE — PROGRESS NOTE ADULT - SUBJECTIVE AND OBJECTIVE BOX
Seen in SICU, awake, alert    Vital Signs Last 24 Hrs  T(C): 37 (01-13-20 @ 15:00), Max: 37 (01-12-20 @ 19:00)  T(F): 98.6 (01-13-20 @ 15:00), Max: 98.6 (01-12-20 @ 19:00)  HR: 56 (01-13-20 @ 15:00) (55 - 79)  BP: 128/69 (01-13-20 @ 07:00) (128/69 - 128/69)  BP(mean): 93 (01-13-20 @ 07:00) (93 - 93)  RR: 10 (01-13-20 @ 15:00) (10 - 23)  SpO2: 100% (01-13-20 @ 15:00) (89% - 100%)    Card S1S2  Lungs coarse BS b/l  Abd soft  Extr + edema RLE > LLE, AVF patent                                                                                          8.4    12.57 )-----------( 398      ( 13 Jan 2020 15:33 )             25.1     13 Jan 2020 00:31    136    |  96     |  24     ----------------------------<  112    4.8     |  22     |  5.18     Ca    7.8        13 Jan 2020 00:31  Phos  6.4       13 Jan 2020 00:31  Mg     2.3       13 Jan 2020 00:31    TPro  4.9    /  Alb  2.2    /  TBili  0.8    /  DBili  0.5    /  AST  8      /  ALT  5      /  AlkPhos  92     13 Jan 2020 00:31    LIVER FUNCTIONS - ( 13 Jan 2020 00:31 )  Alb: 2.2 g/dL / Pro: 4.9 g/dL / ALK PHOS: 92 U/L / ALT: 5 U/L / AST: 8 U/L / GGT: x           PT/INR - ( 13 Jan 2020 00:31 )   PT: 12.1 sec;   INR: 1.05 ratio      bisacodyl Suppository 10 milliGRAM(s) Rectal daily PRN  brimonidine 0.2% Ophthalmic Solution 1 Drop(s) Both EYES three times a day  chlorhexidine 2% Cloths 1 Application(s) Topical <User Schedule>  ciprofloxacin   IVPB 400 milliGRAM(s) IV Intermittent every 24 hours  fluconAZOLE IVPB 400 milliGRAM(s) IV Intermittent every 24 hours  heparin  Infusion 950 Unit(s)/Hr IV Continuous <Continuous>  HYDROmorphone  Injectable 0.5 milliGRAM(s) IV Push every 2 hours PRN  latanoprost 0.005% Ophthalmic Solution 1 Drop(s) Both EYES daily  meropenem  IVPB 500 milliGRAM(s) IV Intermittent every 24 hours  polyethylene glycol 3350 17 Gram(s) Oral daily  predniSONE   Tablet 2.5 milliGRAM(s) Oral daily  sevelamer carbonate 800 milliGRAM(s) Oral three times a day with meals  traMADol 50 milliGRAM(s) Oral every 12 hours PRN    A/P:    Adm w/Flu A 12/27  Hx ESRD on HD  S/p DDRT 12/8/2019, DGF  S/p transplant kidney bx 12/13: ATN, focal TMA  S/p PLEX 12/13 and 12/15  Carlos-nephric hematoma on ultrasound 12/27, increased from prior  S/p OR 1/1 for washout and repeat renal graft biopsy  Repeat renal bx c/w ATN  Infected carlos-nephric hematoma, s/p drainage w/IR  RLE DVT  Gross hematuria  S/p OR 1/10 for infected carlos-nephric hematoma, hemorrhage involving pseudo-aneurysm of anastomosis of renal artery to external iliac vein  S/p transplant nephrectomy  S/p IVCF   Abx per ID  HD TTS  Will follow

## 2020-01-13 NOTE — PROVIDER CONTACT NOTE (CHANGE IN STATUS NOTIFICATION) - RECOMMENDATIONS
Patient bagged and then intubated, code began at 1753. Shocked twice, 2 grams mag for torsades, amio loaded and drip started, levo drip started.

## 2020-01-13 NOTE — CHART NOTE - NSCHARTNOTEFT_GEN_A_CORE
SICU PA Note     Called to patient bedside for AMS after getting back into bed.  At time of evaluation mental status rapidly improved with patient talking and asking questions.  On review of event on monitor patient noted to be bradycardic to 20s with no visible P waves.  As cardiology about to be called, patient suddenly bradycardic again into the 20s with hypotension and loss of A-line waveform.  Chest compressions initiated.  Pt in PEA arrest and given total of Epinephrine x 3 and sodium bicarb x 3.  ROSC achieved but change of rhythm noted to then be in Torsades.  Pt shocked x 1 with return of rhythm, but converted into Torsades and was shocked a second time.  Pt given Amiodarone 300mg x 1 and 2g of Magnesium.  Pt intubated by anesthesia and emergent R femoral CVL placed.  Pt waking up and agitated and in setting of procedure was given Rocuronium x 1.  Pt started on Levophed and Amiodarone infusions.  On bedside ultrasound RV noted to be large and dilated, concerning for PE.  PERT team and cardiology called to bedside.  In setting of recent surgery and pt weaned off pressors at that time, decision was made to restart Heparin infusion with bolus and obtain CTA.   Transplant and vascular teams aware of above, Dr. Deshpande present for above. Family notified.

## 2020-01-13 NOTE — PROGRESS NOTE ADULT - ASSESSMENT
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     RVP with Influenza A.   CXR Clear  s/p Tamiflu treatment course    UCx with > 100K Enterobacter  s/p Ex-Lap and washout of infected hematoma on 1/1  Growth of Enterobacter from surgical cultures  2 of the isolates intermediate/resistant to Ertapenem but rest susceptible  Unclear significance of this    s/p IR guided drainage attempt of hematoma on 1/6  Significant hematuria with clots after procedure    On 1/10 underwent transplant nephrectomy, repair of right external iliac artery with a bovine pericardial patch, repair of right external iliac vein and thrombectomy of right lower extremity veins.     Possible infected pseudoaneurysm and thrombosis around transplant kidney.   Multiple surgical cultures sent - I would follow up on result  I suspect Enterobacter is the primary pathogen  Can continue current regimen pending cultures - so far only with enterobacter on surgical cultures    Overall, Leukocytosis (improving), Infected Hematoma, Positive Urine Culture, Influenza Infection, Fever, Renal Transplant Recipient    --Continue Ciprofloxacin 400 mg IV Q24  --Continue Meropenem 500 mg IV Q24H (anticipate discontinuing soon - cipro monotherapy will likely be sufficient)  --Can continue fluconazole pending surgical cultures but suspicion low for fungal etiology  --Continue to follow CBC with diff  --Continue to follow temperature curve  --Follow up on surgical cultures     I will continue to follow. Please feel free to contact me with any further questions.    Dada Jacobo M.D.  [facility] Division of Infectious Disease  8AM-5PM: Pager Number [phone]  After Hours (or if no response): Please contact the Infectious Diseases Office at [phone]

## 2020-01-13 NOTE — PROGRESS NOTE ADULT - SUBJECTIVE AND OBJECTIVE BOX
Transplant Surgery - Multidisciplinary Rounds  --------------------------------------------------------------  R DDRT    Date:  12/8/19  Dr. Espinal, readmitted 12/27/19 with +Flu/perinephretic collection  Ex Lap washout/ renal bx  Date:  1/1/20    Present:   Patient seen with multidisciplinary team including Transplant Surgeon: Dr. Velásquez, Dr. Poe, Dr. Cruz, Dr. Joy, Dr. Espinal, Transplant Nephrologist: Dr. Lowery, Dr. Irvin, renal fellow, Pharmacist: Debbi Ortiz, PGY 3 Dr. Bess, NA/PA: Dino Aviles, PA student, MED student, and pharmacy studentduring am rounds and examined with Dr. Cruz.  Disciplines not in attendance will be notified of the plan.     HPI: 49M PMHx of ESRD on HD  ( via LUE AVF), HTN, Gout, Glaucoma, NET of pancreas (s/p robotic distal panc/splenectomy at [place] 2015 by Dr. Young, followed by Dr. Raphael, [facility] Oncologist), RA with hand contractures. He underwent DDRT on 12/8/19 (ureteral stent sutured to Martinez - removed 12/15). Post op course c/b DGF requiring HD, thrombocytopenia, elevated LDH and Haptoglobulin. Schistocytes  on peripheral smear concerning for TMA. Envarsus held. Received Belatacept 12/13. Started on PLEX (12/12, 12/15). Underwent renal bx on 12/13 c/w profound ATN.  Found to have much improvement to levels, likely related to Tacrolimus. He was discharged home on 12/20/19 w/ outpatient HD.     Re-admitted 12/27 with influenza, completed treatement with Tamiflu on 12/31. Urine cx on admission grew Ent cloacae, was started on meropenem 12/31. Renal US on admission with increasing hematoma. OR on 1/1 for ex-lap, hematoma evacuation, washout and renal bx. (c/w ATN)  OR cxs grew CRE and e coli. Post op course c/b:   ·	fevers and leukocytosis, CT a/p (1/4) with increased perinephric collection. Underwent IR guided collection 1/6 (fluid cx neg)  ·	New onset hematuria, martinez inserted 1/7, started CBI  ·	RLE edema, RLE doppler (1/8) with acute above the knee thrombus of R external iliac, common femoral and femoral veins, Acute calf vein thrombus affecting R soleal vein, Heparin gtt initiated   ·	AMS 1/9  (head CT neg), hyponatremic  ·	Significant hematuria and bleeding around the martinez. Angio 1/9 showed actively bleeding pseudoaneurysm at the anastomosis of the transplant renal artery with the recipient iliac artery. A stent was placed in the iliac artery to control hemorrhage. IVC filter placed.   ·	Emergent OR 1/9-1/10 for graft nephrectomy;  ureter to bladder anastomosis was completely disrupted, repaired the R external iliac artery with bovine pericardial patch, repaired right external iliac vein, performed thrombectomy of RLE veins, Intra op received 3u PRBC.       cxs:   12/29: Urine Cx: Enterobacter Cloacae  1/1 OR Perinephric collection Cx:  Carbapenem resistant Ent Cloacae, E. Coli  1/1: R kidney abscess swab: Enterobacter Cloacae  1/1  OR tissue: Enterobacter Cloacae  1/5: Urine Cx: Enteropbacter Cloacae  1/5: Skin incision Cx (bedside): NG  1/6: Adominal fluid Cx from IR:  NG   1/7: Urine Cx:  NG  1/7: BCX2:  NG  1/10 retroperit hematoma - enerobacter (pending)  1/10 bladder hematoma - enterobacter (pending)      Interval events:    - POD#3 s/p graft nephrectomy  - afebrile on ABX, VS stable. No pressors required  - S/P one unit PRBC for drop in H&H 7.3/21.6, now H/H stable 8.7/26  - Vascular following: now on heparin gtt  - Anuric via martinez, wound , murky/ss.  - ID following: on zora/cipro/vanco/fluc; OR cxs from 1/10 pending   - tolerating sips of clears  - stable RLE edema     Potential Discharge date: pending clinical improvement       MEDICATIONS  (STANDING):  brimonidine 0.2% Ophthalmic Solution 1 Drop(s) Both EYES three times a day  chlorhexidine 2% Cloths 1 Application(s) Topical <User Schedule>  ciprofloxacin   IVPB 400 milliGRAM(s) IV Intermittent every 24 hours  fluconAZOLE IVPB 400 milliGRAM(s) IV Intermittent every 24 hours  heparin  Infusion 950 Unit(s)/Hr (8.5 mL/Hr) IV Continuous <Continuous>  latanoprost 0.005% Ophthalmic Solution 1 Drop(s) Both EYES daily  meropenem  IVPB 500 milliGRAM(s) IV Intermittent every 24 hours  polyethylene glycol 3350 17 Gram(s) Oral daily  predniSONE   Tablet 2.5 milliGRAM(s) Oral daily    MEDICATIONS  (PRN):  bisacodyl Suppository 10 milliGRAM(s) Rectal daily PRN Constipation  HYDROmorphone  Injectable 0.5 milliGRAM(s) IV Push every 2 hours PRN Moderate Pain (4 - 6)  traMADol 50 milliGRAM(s) Oral every 12 hours PRN Severe Pain (7 - 10)      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  Carpal tunnel syndrome  Brain cyst: had MRI recently- now gone  Anemia  Gastric ulcer: Long time ago  Rheumatoid arthritis  Renal transplant recipient  Breakdown (mechanical) of penile (implanted) prosthesis, sequela  End-stage renal disease (ESRD): PERMACATH PLACEMENT  Abscess of left buttock: s/p I &amp; D  AV fistula: placement left lower arm  S/P cystoscopy: stent placement &amp; removal for kidney stones, lithiotripsy  s/p Lt Carpal tunnel: 2011      Vital Signs Last 24 Hrs  T(C): 36.9 (13 Jan 2020 07:00), Max: 37.4 (12 Jan 2020 11:00)  T(F): 98.4 (13 Jan 2020 07:00), Max: 99.3 (12 Jan 2020 11:00)  HR: 62 (13 Jan 2020 10:00) (55 - 79)  BP: 128/69 (13 Jan 2020 07:00) (128/69 - 128/69)  BP(mean): 93 (13 Jan 2020 07:00) (93 - 93)  RR: 12 (13 Jan 2020 10:00) (11 - 23)  SpO2: 99% (13 Jan 2020 10:00) (89% - 100%)    I&O's Summary    12 Jan 2020 07:01  -  13 Jan 2020 07:00  --------------------------------------------------------  IN: 1182 mL / OUT: 105 mL / NET: 1077 mL    13 Jan 2020 07:01  -  13 Jan 2020 10:22  --------------------------------------------------------  IN: 17 mL / OUT: 0 mL / NET: 17 mL                              8.8    15.26 )-----------( 390      ( 13 Jan 2020 09:08 )             25.5     01-13    136  |  96  |  24<H>  ----------------------------<  112<H>  4.8   |  22  |  5.18<H>    Ca    7.8<L>      13 Jan 2020 00:31  Phos  6.4     01-13  Mg     2.3     01-13    TPro  4.9<L>  /  Alb  2.2<L>  /  TBili  0.8  /  DBili  0.5<H>  /  AST  8<L>  /  ALT  5<L>  /  AlkPhos  92  01-13          Culture - Tissue with Gram Stain (collected 01-10-20 @ 06:07)  Source: .Tissue 1. retroperitoneal hematoma  Gram Stain (01-10-20 @ 13:29):    Few polymorphonuclear leukocytes seen per low power field    No organisms seen per oil power field  Preliminary Report (01-11-20 @ 11:15):    Rare Enterobacter cloacae complex    Culture - Tissue with Gram Stain (collected 01-10-20 @ 06:07)  Source: .Tissue 5. bladder hematoma  Gram Stain (01-10-20 @ 13:35):    No polymorphonuclear leukocytes seen per low power field    No organisms seen per oil power field  Preliminary Report (01-11-20 @ 11:20):    Rare Enterobacter cloacae complex    Culture - Tissue with Gram Stain (collected 01-10-20 @ 06:07)  Source: .Tissue Other  Gram Stain (01-10-20 @ 13:33):    Moderate polymorphonuclear leukocytes seen per low power field    No organisms seen per oil power field  Preliminary Report (01-11-20 @ 08:00):    No growth    Culture - Tissue with Gram Stain (collected 01-10-20 @ 06:07)  Source: .Tissue 3. right veinal artery  Gram Stain (01-10-20 @ 13:29):    Numerous polymorphonuclear leukocytes seen per low power field    No organisms seen per oil power field  Preliminary Report (01-11-20 @ 09:15):    No growth    Culture - Tissue with Gram Stain (collected 01-10-20 @ 06:07)  Source: .Tissue Other  Gram Stain (01-10-20 @ 15:41):    No polymorphonuclear cells seen per low power field    No organisms seen per oil power field  Preliminary Report (01-11-20 @ 08:02):    No growth    Culture - Blood (collected 01-07-20 @ 13:23)  Source: .Blood Blood-Venous  Final Report (01-12-20 @ 14:00):    No growth at 5 days.    Culture - Blood (collected 01-07-20 @ 13:23)  Source: .Blood Blood-Venous  Final Report (01-12-20 @ 14:00):    No growth at 5 days.    Culture - Urine (collected 01-07-20 @ 00:18)  Source: .Urine Catheterized  Final Report (01-07-20 @ 21:22):    <10,000 CFU/mL Normal Urogenital Gillian    Culture - Body Fluid with Gram Stain (collected 01-06-20 @ 21:03)  Source: Abdominal Fl Abdominal Fluid  Gram Stain (01-06-20 @ 23:41):    No polymorphonuclear leukocytes seen    No organisms seen    by cytocentrifuge  Final Report (01-11-20 @ 20:14):    No growth at 5 days    REVIEW OF SYSTEMS  Gen: No weight changes, fatigue, fevers/chills, weakness  Skin: No rashes  Head/Eyes/Ears/Mouth: No headache; Normal hearing; Normal vision w/o blurriness; No sinus pain/discomfort, sore throat  Respiratory: No dyspnea, cough, wheezing, hemoptysis  CV: No chest pain, PND, orthopnea  GI: + abdominal pain, No diarrhea, constipation, nausea, vomiting, melena, hematochezia  : No increased frequency, dysuria, hematuria, nocturia.  Reports resolution of bladder spasms  MSK: No joint pain/swelling; no back pain; no edema  Neuro: No dizziness/lightheadedness, weakness, seizures, numbness, tingling  Heme: No easy bruising or bleeding  Endo: No heat/cold intolerance  Psych: No significant nervousness, anxiety, stress, depression      PHYSICAL EXAM:    Constitutional: NAD  	Eyes: Anicteric, PERRLA  	ENMT: nc/at   	Neck: Supple  	Respiratory: CTA     	Cardiovascular: RRR  	Gastrointestinal: Soft abdomen, appropriate incisional TTP, ND.  wound vac ss/murky output    Genitourinary:  Martinez, anuric, residual hematuria in bag    Extremities: RLE edema   	Vascular: doppler signal R DP and PT, faintly palpable L DP.  3+ pitting edema RLE, 1+LLE . L AVF palpable  	Neurological: A&O x3. no confusion noted on exam  	Skin: no new lesions/ulcerations  	Musculoskeletal: Moving all extremities    Psychiatric: Responsive

## 2020-01-13 NOTE — CONSULT NOTE ADULT - ASSESSMENT
49y Male with ESRD s/p DDRT on 12/8/19 with course complicated by delayed graft function requiring HD, and infected perinephric hematoma s/p washout on 1/1/2020 with worsening sepsis and transfer to ICU, s/p IR drainage of perinephric collection on 1/6. 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 He underwent operative repair of right external iliac artery with pericardial patch, transplant nephrectomy, RLE thrombectomy, and IVC filter on 1/9. Course complicated by Cardiac arrest on 1/13 2/2 suspected PE    #PE  - TTE revealing  septal flattening consistent with right ventricular overload. Right ventricular enlargement  with decreased right ventricular systolic function, Crystal's sign which is suggestive of pulmonary embolism and moderate pulmonary hypertension  - please obtain CTA to confirm diagnosis of PE  - would resume therapeutic anticoagulation with Hep gtt   - consider mechanical thrombectomy if patient does not improve

## 2020-01-13 NOTE — PROGRESS NOTE ADULT - ASSESSMENT
49 year old male with ESRD on HD, now s/p ddrt on 12/8/2019 course complicated by TMA/profound ATN, who presents from a HD center with a fever and cough. He was initially admitted with influenza. He is s/p washout of infected collection and biopsy which revealed ATN.   He is now found to have a diffusely enlarging perinephric fluid collection and is awaiting drainage.    While on telemetry, he had an episode of a wide complex tachycardia. It initially appears irregular, suggesting an atrial arrhythmia with aberrancy, though the remainder appears more like an episode of non-sustained VT.   s/p washout on 1/1/2020 with worsening sepsis and transfer to ICU, s/p IR drainage of perinephric collection on 1/6. 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 He underwent operative repair of right external iliac artery with pericardial patch, transplant nephrectomy, RLE thrombectomy, and IVC filter on 1/9.       - now extubated   - continue to watch on telemetry  - Tele without AF over last 24 hours.  NO NSVT  - hold metoprolol in the setting of hypotension.  May be able to resume today  - Continue IV Hydralazine for BP control.  - Has A line to monitor Hd closely  - watch creatinine and electrolytes. Keep K>4, Mg>2  - can check echocardiogram. Last echocardiogram in 1/2019 with normal LV function      - now with dvt, with bleed on heparin gtt, s/p repair of right external iliac artery/vein and thrombectomy  - labile hgb, trend carefully  - cont hep gtt  - f/u vascular eval    - is at risk of abrupt decompensation noting multisystem abnormalities, wct, new dvt. Cont close sicu monitoring.   - will follow with you

## 2020-01-13 NOTE — PROGRESS NOTE ADULT - ASSESSMENT
KAYLA PA is a 49y Male with ESRD s/p DDRT on 12/8/19 with course complicated by delayed graft function requiring HD, and infected perinephric hematoma s/p washout on 1/1/2020 with worsening sepsis and transfer to ICU, s/p IR drainage of perinephric collection on 1/6. 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 He underwent operative repair of right external iliac artery with pericardial patch, transplant nephrectomy, RLE thrombectomy, and IVC filter on 1/9.     PLAN:    NEURO:   - A/Ox3  - Dilaudid and tramadol prn for pain    RESPIRATORY:  acute respiratory failure  - Extubated  - No active issues    CARDIOVASCULAR:   - Monitor hemodynamics  - Holding home  metoprolol  - Continue hydralazine     GI/NUTRITION: no acute issues  - Regular renal diet  - Miralax and dulcolax suppository prn, monitor BMs    GENITOURINARY/RENAL: s/p transplant nephrectomy   - IV locked   - Monitor electrolytes  - Continue w/ HD  - Transplant meds: solumedrol IV 4 mg daily (switch to PO if nausea improves)    HEMATOLOGIC: acute hemorrhagic anemia  - Trend H/H, coagulation panel  - Therapeutic heparin gtt goal PTT 60-80    INFECTIOUS DISEASE: infected perinephric hematoma s/p explantation, washout  - Meropenem 500 mg daily  - Fluconazole 400 mg daily  - Ciprofloxacin 400mg daily  - given one dose od vancomycin 1/10     ENDOCRINE: no acute issues  - Monitor glucose on BMP    OPHTHALMOLOGY:  - Brimonidine/latanoprost drops    - SICU full code

## 2020-01-14 DIAGNOSIS — I26.02 SADDLE EMBOLUS OF PULMONARY ARTERY WITH ACUTE COR PULMONALE: ICD-10-CM

## 2020-01-14 LAB
ALBUMIN SERPL ELPH-MCNC: 2.4 G/DL — LOW (ref 3.3–5)
ALBUMIN SERPL ELPH-MCNC: 2.5 G/DL — LOW (ref 3.3–5)
ALBUMIN SERPL ELPH-MCNC: 2.6 G/DL — LOW (ref 3.3–5)
ALP SERPL-CCNC: 213 U/L — HIGH (ref 40–120)
ALP SERPL-CCNC: 233 U/L — HIGH (ref 40–120)
ALP SERPL-CCNC: 235 U/L — HIGH (ref 40–120)
ALP SERPL-CCNC: 237 U/L — HIGH (ref 40–120)
ALP SERPL-CCNC: 271 U/L — HIGH (ref 40–120)
ALT FLD-CCNC: 10 U/L — SIGNIFICANT CHANGE UP (ref 10–45)
ALT FLD-CCNC: 10 U/L — SIGNIFICANT CHANGE UP (ref 10–45)
ALT FLD-CCNC: 8 U/L — LOW (ref 10–45)
ALT FLD-CCNC: 8 U/L — LOW (ref 10–45)
ALT FLD-CCNC: 9 U/L — LOW (ref 10–45)
ANION GAP SERPL CALC-SCNC: 13 MMOL/L — SIGNIFICANT CHANGE UP (ref 5–17)
ANION GAP SERPL CALC-SCNC: 13 MMOL/L — SIGNIFICANT CHANGE UP (ref 5–17)
ANION GAP SERPL CALC-SCNC: 15 MMOL/L — SIGNIFICANT CHANGE UP (ref 5–17)
ANION GAP SERPL CALC-SCNC: 16 MMOL/L — SIGNIFICANT CHANGE UP (ref 5–17)
ANION GAP SERPL CALC-SCNC: 17 MMOL/L — SIGNIFICANT CHANGE UP (ref 5–17)
APTT BLD: 105 SEC — HIGH (ref 27.5–36.3)
APTT BLD: 84.1 SEC — HIGH (ref 27.5–36.3)
APTT BLD: 90.6 SEC — HIGH (ref 27.5–36.3)
APTT BLD: 90.6 SEC — HIGH (ref 27.5–36.3)
APTT BLD: 93.8 SEC — HIGH (ref 27.5–36.3)
AST SERPL-CCNC: 27 U/L — SIGNIFICANT CHANGE UP (ref 10–40)
AST SERPL-CCNC: 31 U/L — SIGNIFICANT CHANGE UP (ref 10–40)
AST SERPL-CCNC: 34 U/L — SIGNIFICANT CHANGE UP (ref 10–40)
AST SERPL-CCNC: 49 U/L — HIGH (ref 10–40)
AST SERPL-CCNC: 56 U/L — HIGH (ref 10–40)
BILIRUB DIRECT SERPL-MCNC: 0.5 MG/DL — HIGH (ref 0–0.2)
BILIRUB DIRECT SERPL-MCNC: 0.5 MG/DL — HIGH (ref 0–0.2)
BILIRUB DIRECT SERPL-MCNC: 0.7 MG/DL — HIGH (ref 0–0.2)
BILIRUB DIRECT SERPL-MCNC: 0.8 MG/DL — HIGH (ref 0–0.2)
BILIRUB DIRECT SERPL-MCNC: 1 MG/DL — HIGH (ref 0–0.2)
BILIRUB INDIRECT FLD-MCNC: 0.3 MG/DL — SIGNIFICANT CHANGE UP (ref 0.2–1)
BILIRUB INDIRECT FLD-MCNC: 0.4 MG/DL — SIGNIFICANT CHANGE UP (ref 0.2–1)
BILIRUB INDIRECT FLD-MCNC: 0.4 MG/DL — SIGNIFICANT CHANGE UP (ref 0.2–1)
BILIRUB INDIRECT FLD-MCNC: 0.5 MG/DL — SIGNIFICANT CHANGE UP (ref 0.2–1)
BILIRUB INDIRECT FLD-MCNC: 0.6 MG/DL — SIGNIFICANT CHANGE UP (ref 0.2–1)
BILIRUB SERPL-MCNC: 0.8 MG/DL — SIGNIFICANT CHANGE UP (ref 0.2–1.2)
BILIRUB SERPL-MCNC: 0.9 MG/DL — SIGNIFICANT CHANGE UP (ref 0.2–1.2)
BILIRUB SERPL-MCNC: 1.1 MG/DL — SIGNIFICANT CHANGE UP (ref 0.2–1.2)
BILIRUB SERPL-MCNC: 1.3 MG/DL — HIGH (ref 0.2–1.2)
BILIRUB SERPL-MCNC: 1.6 MG/DL — HIGH (ref 0.2–1.2)
BUN SERPL-MCNC: 15 MG/DL — SIGNIFICANT CHANGE UP (ref 7–23)
BUN SERPL-MCNC: 18 MG/DL — SIGNIFICANT CHANGE UP (ref 7–23)
BUN SERPL-MCNC: 31 MG/DL — HIGH (ref 7–23)
BUN SERPL-MCNC: 34 MG/DL — HIGH (ref 7–23)
BUN SERPL-MCNC: 35 MG/DL — HIGH (ref 7–23)
CALCIUM SERPL-MCNC: 7.8 MG/DL — LOW (ref 8.4–10.5)
CALCIUM SERPL-MCNC: 8 MG/DL — LOW (ref 8.4–10.5)
CALCIUM SERPL-MCNC: 8.1 MG/DL — LOW (ref 8.4–10.5)
CALCIUM SERPL-MCNC: 8.3 MG/DL — LOW (ref 8.4–10.5)
CALCIUM SERPL-MCNC: 8.7 MG/DL — SIGNIFICANT CHANGE UP (ref 8.4–10.5)
CHLORIDE SERPL-SCNC: 94 MMOL/L — LOW (ref 96–108)
CHLORIDE SERPL-SCNC: 97 MMOL/L — SIGNIFICANT CHANGE UP (ref 96–108)
CHLORIDE SERPL-SCNC: 97 MMOL/L — SIGNIFICANT CHANGE UP (ref 96–108)
CHLORIDE SERPL-SCNC: 98 MMOL/L — SIGNIFICANT CHANGE UP (ref 96–108)
CHLORIDE SERPL-SCNC: 98 MMOL/L — SIGNIFICANT CHANGE UP (ref 96–108)
CO2 SERPL-SCNC: 22 MMOL/L — SIGNIFICANT CHANGE UP (ref 22–31)
CO2 SERPL-SCNC: 23 MMOL/L — SIGNIFICANT CHANGE UP (ref 22–31)
CO2 SERPL-SCNC: 24 MMOL/L — SIGNIFICANT CHANGE UP (ref 22–31)
CO2 SERPL-SCNC: 24 MMOL/L — SIGNIFICANT CHANGE UP (ref 22–31)
CO2 SERPL-SCNC: 25 MMOL/L — SIGNIFICANT CHANGE UP (ref 22–31)
CREAT SERPL-MCNC: 3.11 MG/DL — HIGH (ref 0.5–1.3)
CREAT SERPL-MCNC: 4.12 MG/DL — HIGH (ref 0.5–1.3)
CREAT SERPL-MCNC: 5.82 MG/DL — HIGH (ref 0.5–1.3)
CREAT SERPL-MCNC: 5.95 MG/DL — HIGH (ref 0.5–1.3)
CREAT SERPL-MCNC: 6.18 MG/DL — HIGH (ref 0.5–1.3)
GAS PNL BLDA: SIGNIFICANT CHANGE UP
GAS PNL BLDA: SIGNIFICANT CHANGE UP
GLUCOSE SERPL-MCNC: 106 MG/DL — HIGH (ref 70–99)
GLUCOSE SERPL-MCNC: 143 MG/DL — HIGH (ref 70–99)
GLUCOSE SERPL-MCNC: 91 MG/DL — SIGNIFICANT CHANGE UP (ref 70–99)
GLUCOSE SERPL-MCNC: 92 MG/DL — SIGNIFICANT CHANGE UP (ref 70–99)
GLUCOSE SERPL-MCNC: 97 MG/DL — SIGNIFICANT CHANGE UP (ref 70–99)
HCT VFR BLD CALC: 24 % — LOW (ref 39–50)
HCT VFR BLD CALC: 24.2 % — LOW (ref 39–50)
HCT VFR BLD CALC: 24.4 % — LOW (ref 39–50)
HCT VFR BLD CALC: 24.5 % — LOW (ref 39–50)
HCT VFR BLD CALC: 25.3 % — LOW (ref 39–50)
HGB BLD-MCNC: 7.8 G/DL — LOW (ref 13–17)
HGB BLD-MCNC: 8.2 G/DL — LOW (ref 13–17)
HGB BLD-MCNC: 8.2 G/DL — LOW (ref 13–17)
HGB BLD-MCNC: 8.3 G/DL — LOW (ref 13–17)
HGB BLD-MCNC: 8.7 G/DL — LOW (ref 13–17)
INR BLD: 1.13 RATIO — SIGNIFICANT CHANGE UP (ref 0.88–1.16)
INR BLD: 1.14 RATIO — SIGNIFICANT CHANGE UP (ref 0.88–1.16)
INR BLD: 1.15 RATIO — SIGNIFICANT CHANGE UP (ref 0.88–1.16)
INR BLD: 1.17 RATIO — HIGH (ref 0.88–1.16)
INR BLD: 1.17 RATIO — HIGH (ref 0.88–1.16)
MAGNESIUM SERPL-MCNC: 2.2 MG/DL — SIGNIFICANT CHANGE UP (ref 1.6–2.6)
MAGNESIUM SERPL-MCNC: 2.3 MG/DL — SIGNIFICANT CHANGE UP (ref 1.6–2.6)
MAGNESIUM SERPL-MCNC: 2.8 MG/DL — HIGH (ref 1.6–2.6)
MAGNESIUM SERPL-MCNC: 2.8 MG/DL — HIGH (ref 1.6–2.6)
MAGNESIUM SERPL-MCNC: 2.9 MG/DL — HIGH (ref 1.6–2.6)
MCHC RBC-ENTMCNC: 29.5 PG — SIGNIFICANT CHANGE UP (ref 27–34)
MCHC RBC-ENTMCNC: 30.3 PG — SIGNIFICANT CHANGE UP (ref 27–34)
MCHC RBC-ENTMCNC: 30.4 PG — SIGNIFICANT CHANGE UP (ref 27–34)
MCHC RBC-ENTMCNC: 30.6 PG — SIGNIFICANT CHANGE UP (ref 27–34)
MCHC RBC-ENTMCNC: 31.1 PG — SIGNIFICANT CHANGE UP (ref 27–34)
MCHC RBC-ENTMCNC: 32.5 GM/DL — SIGNIFICANT CHANGE UP (ref 32–36)
MCHC RBC-ENTMCNC: 33.6 GM/DL — SIGNIFICANT CHANGE UP (ref 32–36)
MCHC RBC-ENTMCNC: 33.9 GM/DL — SIGNIFICANT CHANGE UP (ref 32–36)
MCHC RBC-ENTMCNC: 33.9 GM/DL — SIGNIFICANT CHANGE UP (ref 32–36)
MCHC RBC-ENTMCNC: 34.4 GM/DL — SIGNIFICANT CHANGE UP (ref 32–36)
MCV RBC AUTO: 89.7 FL — SIGNIFICANT CHANGE UP (ref 80–100)
MCV RBC AUTO: 90 FL — SIGNIFICANT CHANGE UP (ref 80–100)
MCV RBC AUTO: 90.3 FL — SIGNIFICANT CHANGE UP (ref 80–100)
MCV RBC AUTO: 90.4 FL — SIGNIFICANT CHANGE UP (ref 80–100)
MCV RBC AUTO: 90.9 FL — SIGNIFICANT CHANGE UP (ref 80–100)
NRBC # BLD: 0 /100 WBCS — SIGNIFICANT CHANGE UP (ref 0–0)
PHOSPHATE SERPL-MCNC: 3 MG/DL — SIGNIFICANT CHANGE UP (ref 2.5–4.5)
PHOSPHATE SERPL-MCNC: 4.2 MG/DL — SIGNIFICANT CHANGE UP (ref 2.5–4.5)
PHOSPHATE SERPL-MCNC: 6.2 MG/DL — HIGH (ref 2.5–4.5)
PHOSPHATE SERPL-MCNC: 6.3 MG/DL — HIGH (ref 2.5–4.5)
PHOSPHATE SERPL-MCNC: 6.7 MG/DL — HIGH (ref 2.5–4.5)
PLATELET # BLD AUTO: 348 K/UL — SIGNIFICANT CHANGE UP (ref 150–400)
PLATELET # BLD AUTO: 353 K/UL — SIGNIFICANT CHANGE UP (ref 150–400)
PLATELET # BLD AUTO: 358 K/UL — SIGNIFICANT CHANGE UP (ref 150–400)
PLATELET # BLD AUTO: 359 K/UL — SIGNIFICANT CHANGE UP (ref 150–400)
PLATELET # BLD AUTO: 365 K/UL — SIGNIFICANT CHANGE UP (ref 150–400)
POTASSIUM SERPL-MCNC: 3.4 MMOL/L — LOW (ref 3.5–5.3)
POTASSIUM SERPL-MCNC: 4 MMOL/L — SIGNIFICANT CHANGE UP (ref 3.5–5.3)
POTASSIUM SERPL-MCNC: 4.4 MMOL/L — SIGNIFICANT CHANGE UP (ref 3.5–5.3)
POTASSIUM SERPL-MCNC: 4.5 MMOL/L — SIGNIFICANT CHANGE UP (ref 3.5–5.3)
POTASSIUM SERPL-MCNC: 4.7 MMOL/L — SIGNIFICANT CHANGE UP (ref 3.5–5.3)
POTASSIUM SERPL-SCNC: 3.4 MMOL/L — LOW (ref 3.5–5.3)
POTASSIUM SERPL-SCNC: 4 MMOL/L — SIGNIFICANT CHANGE UP (ref 3.5–5.3)
POTASSIUM SERPL-SCNC: 4.4 MMOL/L — SIGNIFICANT CHANGE UP (ref 3.5–5.3)
POTASSIUM SERPL-SCNC: 4.5 MMOL/L — SIGNIFICANT CHANGE UP (ref 3.5–5.3)
POTASSIUM SERPL-SCNC: 4.7 MMOL/L — SIGNIFICANT CHANGE UP (ref 3.5–5.3)
PROT SERPL-MCNC: 4.7 G/DL — LOW (ref 6–8.3)
PROT SERPL-MCNC: 4.9 G/DL — LOW (ref 6–8.3)
PROT SERPL-MCNC: 5.1 G/DL — LOW (ref 6–8.3)
PROTHROM AB SERPL-ACNC: 13 SEC — HIGH (ref 10–12.9)
PROTHROM AB SERPL-ACNC: 13 SEC — HIGH (ref 10–12.9)
PROTHROM AB SERPL-ACNC: 13.3 SEC — HIGH (ref 10–12.9)
PROTHROM AB SERPL-ACNC: 13.4 SEC — HIGH (ref 10–12.9)
PROTHROM AB SERPL-ACNC: 13.5 SEC — HIGH (ref 10–12.9)
RBC # BLD: 2.64 M/UL — LOW (ref 4.2–5.8)
RBC # BLD: 2.68 M/UL — LOW (ref 4.2–5.8)
RBC # BLD: 2.71 M/UL — LOW (ref 4.2–5.8)
RBC # BLD: 2.73 M/UL — LOW (ref 4.2–5.8)
RBC # BLD: 2.8 M/UL — LOW (ref 4.2–5.8)
RBC # FLD: 16.3 % — HIGH (ref 10.3–14.5)
RBC # FLD: 16.4 % — HIGH (ref 10.3–14.5)
RBC # FLD: 16.6 % — HIGH (ref 10.3–14.5)
SODIUM SERPL-SCNC: 132 MMOL/L — LOW (ref 135–145)
SODIUM SERPL-SCNC: 134 MMOL/L — LOW (ref 135–145)
SODIUM SERPL-SCNC: 136 MMOL/L — SIGNIFICANT CHANGE UP (ref 135–145)
SODIUM SERPL-SCNC: 137 MMOL/L — SIGNIFICANT CHANGE UP (ref 135–145)
SODIUM SERPL-SCNC: 137 MMOL/L — SIGNIFICANT CHANGE UP (ref 135–145)
TACROLIMUS SERPL-MCNC: <2 NG/ML — SIGNIFICANT CHANGE UP
TROPONIN T, HIGH SENSITIVITY RESULT: 1054 NG/L — HIGH (ref 0–51)
TROPONIN T, HIGH SENSITIVITY RESULT: 769 NG/L — HIGH (ref 0–51)
TROPONIN T, HIGH SENSITIVITY RESULT: 901 NG/L — HIGH (ref 0–51)
TROPONIN T, HIGH SENSITIVITY RESULT: 951 NG/L — HIGH (ref 0–51)
TROPONIN T, HIGH SENSITIVITY RESULT: 961 NG/L — HIGH (ref 0–51)
WBC # BLD: 12.95 K/UL — HIGH (ref 3.8–10.5)
WBC # BLD: 13.59 K/UL — HIGH (ref 3.8–10.5)
WBC # BLD: 14.09 K/UL — HIGH (ref 3.8–10.5)
WBC # BLD: 15.59 K/UL — HIGH (ref 3.8–10.5)
WBC # BLD: 21.17 K/UL — HIGH (ref 3.8–10.5)
WBC # FLD AUTO: 12.95 K/UL — HIGH (ref 3.8–10.5)
WBC # FLD AUTO: 13.59 K/UL — HIGH (ref 3.8–10.5)
WBC # FLD AUTO: 14.09 K/UL — HIGH (ref 3.8–10.5)
WBC # FLD AUTO: 15.59 K/UL — HIGH (ref 3.8–10.5)
WBC # FLD AUTO: 21.17 K/UL — HIGH (ref 3.8–10.5)

## 2020-01-14 PROCEDURE — 71045 X-RAY EXAM CHEST 1 VIEW: CPT | Mod: 26,76

## 2020-01-14 PROCEDURE — 99291 CRITICAL CARE FIRST HOUR: CPT

## 2020-01-14 PROCEDURE — 99233 SBSQ HOSP IP/OBS HIGH 50: CPT

## 2020-01-14 PROCEDURE — 99292 CRITICAL CARE ADDL 30 MIN: CPT

## 2020-01-14 PROCEDURE — 99223 1ST HOSP IP/OBS HIGH 75: CPT

## 2020-01-14 PROCEDURE — 99291 CRITICAL CARE FIRST HOUR: CPT | Mod: 25

## 2020-01-14 PROCEDURE — 99232 SBSQ HOSP IP/OBS MODERATE 35: CPT

## 2020-01-14 PROCEDURE — 99232 SBSQ HOSP IP/OBS MODERATE 35: CPT | Mod: GC

## 2020-01-14 RX ORDER — ALBUMIN HUMAN 25 %
250 VIAL (ML) INTRAVENOUS ONCE
Refills: 0 | Status: COMPLETED | OUTPATIENT
Start: 2020-01-14 | End: 2020-01-15

## 2020-01-14 RX ORDER — CALCIUM GLUCONATE 100 MG/ML
2 VIAL (ML) INTRAVENOUS ONCE
Refills: 0 | Status: COMPLETED | OUTPATIENT
Start: 2020-01-14 | End: 2020-01-14

## 2020-01-14 RX ORDER — MIDAZOLAM HYDROCHLORIDE 1 MG/ML
2 INJECTION, SOLUTION INTRAMUSCULAR; INTRAVENOUS
Refills: 0 | Status: DISCONTINUED | OUTPATIENT
Start: 2020-01-14 | End: 2020-01-16

## 2020-01-14 RX ADMIN — Medication 200 GRAM(S): at 00:39

## 2020-01-14 RX ADMIN — BRIMONIDINE TARTRATE 1 DROP(S): 2 SOLUTION/ DROPS OPHTHALMIC at 14:57

## 2020-01-14 RX ADMIN — BRIMONIDINE TARTRATE 1 DROP(S): 2 SOLUTION/ DROPS OPHTHALMIC at 05:09

## 2020-01-14 RX ADMIN — SODIUM CHLORIDE 50 MILLILITER(S): 9 INJECTION INTRAMUSCULAR; INTRAVENOUS; SUBCUTANEOUS at 08:58

## 2020-01-14 RX ADMIN — FENTANYL CITRATE 1.59 MICROGRAM(S)/KG/HR: 50 INJECTION INTRAVENOUS at 08:55

## 2020-01-14 RX ADMIN — CHLORHEXIDINE GLUCONATE 1 APPLICATION(S): 213 SOLUTION TOPICAL at 05:09

## 2020-01-14 RX ADMIN — HEPARIN SODIUM 9 UNIT(S)/HR: 5000 INJECTION INTRAVENOUS; SUBCUTANEOUS at 08:56

## 2020-01-14 RX ADMIN — AMIODARONE HYDROCHLORIDE 16.67 MG/MIN: 400 TABLET ORAL at 08:57

## 2020-01-14 RX ADMIN — MEROPENEM 100 MILLIGRAM(S): 1 INJECTION INTRAVENOUS at 21:33

## 2020-01-14 RX ADMIN — CHLORHEXIDINE GLUCONATE 15 MILLILITER(S): 213 SOLUTION TOPICAL at 21:33

## 2020-01-14 RX ADMIN — Medication 1 APPLICATION(S): at 11:34

## 2020-01-14 RX ADMIN — Medication 4 MILLIGRAM(S): at 05:08

## 2020-01-14 RX ADMIN — CHLORHEXIDINE GLUCONATE 15 MILLILITER(S): 213 SOLUTION TOPICAL at 14:57

## 2020-01-14 RX ADMIN — Medication 200 GRAM(S): at 05:08

## 2020-01-14 RX ADMIN — BRIMONIDINE TARTRATE 1 DROP(S): 2 SOLUTION/ DROPS OPHTHALMIC at 21:33

## 2020-01-14 RX ADMIN — CHLORHEXIDINE GLUCONATE 15 MILLILITER(S): 213 SOLUTION TOPICAL at 05:08

## 2020-01-14 RX ADMIN — Medication 2 MG/KG/HR: at 09:00

## 2020-01-14 RX ADMIN — PANTOPRAZOLE SODIUM 40 MILLIGRAM(S): 20 TABLET, DELAYED RELEASE ORAL at 11:24

## 2020-01-14 RX ADMIN — FLUCONAZOLE 100 MILLIGRAM(S): 150 TABLET ORAL at 05:35

## 2020-01-14 RX ADMIN — LATANOPROST 1 DROP(S): 0.05 SOLUTION/ DROPS OPHTHALMIC; TOPICAL at 11:24

## 2020-01-14 RX ADMIN — Medication 200 MILLIGRAM(S): at 11:24

## 2020-01-14 NOTE — PROGRESS NOTE ADULT - ATTENDING COMMENTS
Patient seen and examined on AM rounds on 1/14.  Overnight patient became unstable and lost spontaneous circulation s/p ACLS and diagnosed of a saddle PE. Patient was started on heparin drip overnight.   hemodynamically stable no on minimal ventilator settings     Acute hypoxic respiratory failure secondary to saddle PE - will continue MV at this time and monitor oxygenation with goal SpO2 > 92%  -oxygenation appropriate   Saddle PE - Discussed with Dr. Parrish and given the patient's hemodynamic stability would not recommend catheter directed thrombolysis at this time as it may be a greater risk than benefit.   -will continue to monitor hemodynamics at this time.   Will continue heparin drip with goal PTT around 60-99  Troponin increased secondary to PE   ESRD - will plan HD as per transplant nephrology    CC time: 42 minutes    Patient to continue to be monitored closely

## 2020-01-14 NOTE — PROGRESS NOTE ADULT - ASSESSMENT
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 on 12/27 with cough and fever. Now with infected carlos-transplant hematoma.    RVP with Influenza A. s/p Tamiflu treatment course    1/1: s/p Ex-Lap and washout of infected hematoma (Enterobacter from cultures)  1/6: s/p IR guided drainage attempt of hematoma on 1/6 (NGTD from cultures  1/10: s/p transplant nephrectomy, repair of right external iliac artery with a bovine pericardial patch, repair of right external iliac vein and thrombectomy of right lower extremity veins.   1/10: Surgical cultures with Enterobacter    Some of the Enterobacter isolates R and S to Ertapenem  per core lab Ertapenem at borderline of Susceptible and Resistant breakpoint    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   I suspect Enterobacter is the primary pathogen  Ciprofloxacin as monotherapy should be sufficient therapy for Enterobacter (all isolates susceptible)    PEA arrest and Torsades on 1/13  CT C/A/P (1/13) with Saddle PE, interval increase in size of RP fluid collection (infected hematoma)    Overall, Saddle pulmonary embolus, Leukocytosis (increasing), Infected Hematoma, Positive Urine Culture, Influenza Infection, Fever, Renal Transplant Recipient    --Continue Ciprofloxacin 400 mg IV Q24  --Continue Meropenem 500 mg IV Q24H (would discontinue soon - cipro monotherapy should be sufficient)  --Would discontinue fluconazole - no evidence of yeast on cultures  --Continue to follow CBC with diff  --Continue to follow temperature curve  --Follow up on preliminary surgical cultures   --Management of Saddle PE per 8ICU team and vascular teams    I will be away tomorrow. Please contact the Infectious Diseases Office to contact the covering Infectious Diseases Attending.     Dada Jacobo M.D.  [facility] Division of Infectious Disease  8AM-5PM: Pager Number [phone]  After Hours (or if no response): Please contact the Infectious Diseases Office at [phone]

## 2020-01-14 NOTE — PROGRESS NOTE ADULT - ATTENDING COMMENTS
49 yr old man with ESRD s/p DDRT in Dec 2019 complicated by DGF due to ATN and early TMA thought to be due to prograf. Was on belatacept/MMF/Pred immunosuppression, still requiring dialysis.   He was admitted with influenza, course complicated by perinephric hematoma complicated by infection (culture +ve for enterobacter)  s/p surgical exploration and wash out. He developed DVT started on heparin drip, subsequent gross hematuria. He was taken to the OR and found to have dehiscence of the ureter-bladder anastomosis as well as vascular anastomosis. He underwent graft nephrectomy on 1/10/20 with bovine biopatch repair of the EIA. Was maintained on antibiotics with meropenem, cipro and diflucan.     Yesterday he had cardiac arrest in the setting of saddle embolus. He is s/p CPR, remains on vent. Off vasopressors this AM. Remains on heparin drip and amiodarone drip. FiO2 30%. Has edema of b/l LE due to DVT R> L, distal pulses dopplerable.     - He received HD for 2 hours by Primary nephrologist this afternoon. No UF.  Remains hemodynamically stable. Continue heparin drip.

## 2020-01-14 NOTE — PROGRESS NOTE ADULT - SUBJECTIVE AND OBJECTIVE BOX
[facility] DIVISION OF KIDNEY DISEASES AND HYPERTENSION -- FOLLOW UP NOTE  --------------------------------------------------------------------------------  HPI: 48 yo M with hx of ESRD on HD, s/p DDRT on 12/8, elevated SCr on HD using AVF, complicated by TMA related to CNI, now on belatacept, admitted with influenza. Pt. overall with complicated hospital course, including persistent hematuria, RLE DVT, and DGF. Had transplant nephrectomy along with endovascular repair and IVC filter placement on 1/10/20. In the past 24 hours, pt. had multiple cardiac events 2/2 saddle PE, despite IVC filter placement and heparin gtt. Pt. is currently intubated and amiodarone gtt with consultants determining need for direct thrombolysis of thromboemboli.     Pt. seen and examined at bedside this AM in SICU. Unable to complete ROS.    PAST HISTORY  --------------------------------------------------------------------------------  No significant changes to PMH, PSH, FHx, SHx, unless otherwise noted    ALLERGIES & MEDICATIONS  --------------------------------------------------------------------------------  Allergies    coconut (Anaphylaxis)  morphine (Pruritus; Rash)    Intolerances    Standing Inpatient Medications  aMIOdarone Infusion 1 mG/Min IV Continuous <Continuous>  aMIOdarone Infusion 0.5 mG/Min IV Continuous <Continuous>  brimonidine 0.2% Ophthalmic Solution 1 Drop(s) Both EYES three times a day  chlorhexidine 0.12% Liquid 15 milliLiter(s) Oral Mucosa <User Schedule>  chlorhexidine 2% Cloths 1 Application(s) Topical <User Schedule>  ciprofloxacin   IVPB 400 milliGRAM(s) IV Intermittent every 24 hours  fentaNYL   Infusion. 0.5 MICROgram(s)/kG/Hr IV Continuous <Continuous>  fluconAZOLE IVPB 400 milliGRAM(s) IV Intermittent every 24 hours  heparin  Infusion 1000 Unit(s)/Hr IV Continuous <Continuous>  latanoprost 0.005% Ophthalmic Solution 1 Drop(s) Both EYES daily  LORazepam Infusion 0.031 mG/kG/Hr IV Continuous <Continuous>  meropenem  IVPB 500 milliGRAM(s) IV Intermittent every 24 hours  methylPREDNISolone sodium succinate Injectable 4 milliGRAM(s) IV Push daily  pantoprazole  Injectable 40 milliGRAM(s) IV Push daily  petrolatum Ophthalmic Ointment 1 Application(s) Both EYES daily  sodium chloride 0.9%. 1000 milliLiter(s) IV Continuous <Continuous>    REVIEW OF SYSTEMS  --------------------------------------------------------------------------------  Unable to complete.    VITALS/PHYSICAL EXAM  --------------------------------------------------------------------------------  T(C): 36.3 (01-14-20 @ 07:00), Max: 37 (01-13-20 @ 15:00)  HR: 66 (01-14-20 @ 09:45) (54 - 108)  BP: --  RR: 21 (01-14-20 @ 09:45) (8 - 84)  SpO2: 100% (01-14-20 @ 09:45) (88% - 100%)  Wt(kg): --    01-13-20 @ 07:01 - 01-14-20 @ 07:00  --------------------------------------------------------  IN: 1985.6 mL / OUT: 350 mL / NET: 1635.6 mL    01-14-20 @ 07:01  -  01-14-20 @ 11:01  --------------------------------------------------------  IN: 158.6 mL / OUT: 0 mL / NET: 158.6 mL    Physical Exam:  	Gen: Intubated  	HEENT: + ETT  	Pulm: + mechanical breath sounds   	CV: RRR, S1S2; no rub  	Abd: +BS, soft, RLQ with wound vac in place                       Transplant: No tenderness, swelling  	: No suprapubic tenderness  	LE: right leg 2+ pitting edema, left leg with increased pitting edema since yesterday    LABS/STUDIES  --------------------------------------------------------------------------------              8.3    14.09 >-----------<  365      [01-14-20 @ 10:30]              24.5     136  |  98  |  34  ----------------------------<  91      [01-14-20 @ 05:24]  4.5   |  25  |  5.95        Ca     8.7     [01-14-20 @ 05:24]      Mg     2.8     [01-14-20 @ 05:24]      Phos  6.3     [01-14-20 @ 05:24]    Creatinine Trend:  SCr 5.95 [01-14 @ 05:24]  SCr 5.82 [01-14 @ 01:12]  SCr 5.79 [01-13 @ 21:40]  SCr 5.82 [01-13 @ 18:20]  SCr 5.18 [01-13 @ 00:31]

## 2020-01-14 NOTE — PROGRESS NOTE ADULT - ASSESSMENT
Assessment:  1. PE - likely provoked massive bilaterally PE in the setting of recent acute above-the-knee thrombus affecting the right external iliac, common femoral, femoral veins and right soleal vein s/p thrombectomy and filter placement. Overnight, CT's reviewed - appears patient has extensive thrombus above and below the IVC filter now. Hemodynamically patient required pressor initiation which has been weaned off as of this morning. Tolerating AC. Oxygenating well.   2. Pseudoaneurysm of right external iliac artery / transplant renal artery anastomosis - s/p operative repair of right external iliac artery with pericardial patch and transplant nephrectomy  3. Cardiac arrest   3. Perinephric hematoma complicated by infection     Plan:  1. Continue heparin gtt for now. No thrombolytic/thrombectomy therapy indicated at this time.  2. Eventual oral AC as patient displays stability.   3. Repeat Echo tomorrow (limited to evaluate RV function).   3. Arrythmia management per general cardiology.    Thank you,  Oj Moreau MD  Cardiology Fellow  Vascular Cardiology Service

## 2020-01-14 NOTE — PROGRESS NOTE ADULT - SUBJECTIVE AND OBJECTIVE BOX
[facility]    Melissa Kelsey, Enrique, Maximino, Marin, Hamzah, Ravi      [phone]    CHIEF COMPLAINT: Patient is a 49y old  Male who presents with a chief complaint of Fever (14 Jan 2020 04:43)      Follow Up: atn, hd, dvt s/p thrombectomy, now s/p arrest, pe    Interim history: events noted.  Developed bradycardia, leading to rapid arrhythmias, including apparent rapid af ->tdp, for which he was shocked. Vented, on pressors overnight and found with large PE, not clearly a saddle PE based on most recent interp    MEDICATIONS  (STANDING):  aMIOdarone Infusion 1 mG/Min (33.333 mL/Hr) IV Continuous <Continuous>  aMIOdarone Infusion 0.5 mG/Min (16.667 mL/Hr) IV Continuous <Continuous>  brimonidine 0.2% Ophthalmic Solution 1 Drop(s) Both EYES three times a day  chlorhexidine 0.12% Liquid 15 milliLiter(s) Oral Mucosa <User Schedule>  chlorhexidine 2% Cloths 1 Application(s) Topical <User Schedule>  ciprofloxacin   IVPB 400 milliGRAM(s) IV Intermittent every 24 hours  fentaNYL   Infusion. 0.5 MICROgram(s)/kG/Hr (1.587 mL/Hr) IV Continuous <Continuous>  fluconAZOLE IVPB 400 milliGRAM(s) IV Intermittent every 24 hours  heparin  Infusion 1000 Unit(s)/Hr (9 mL/Hr) IV Continuous <Continuous>  latanoprost 0.005% Ophthalmic Solution 1 Drop(s) Both EYES daily  LORazepam Infusion 0.031 mG/kG/Hr (2 mL/Hr) IV Continuous <Continuous>  meropenem  IVPB 500 milliGRAM(s) IV Intermittent every 24 hours  methylPREDNISolone sodium succinate Injectable 4 milliGRAM(s) IV Push daily  norepinephrine Infusion 0.02 MICROgram(s)/kG/Min (2.381 mL/Hr) IV Continuous <Continuous>  pantoprazole  Injectable 40 milliGRAM(s) IV Push daily  petrolatum Ophthalmic Ointment 1 Application(s) Both EYES daily  sodium chloride 0.9%. 1000 milliLiter(s) (50 mL/Hr) IV Continuous <Continuous>    MEDICATIONS  (PRN):  HYDROmorphone  Injectable 0.5 milliGRAM(s) IV Push every 3 hours PRN Breakthrough Pain      REVIEW OF SYSTEMS: vented and unable to assess    Vital Signs Last 24 Hrs  T(C): 36.3 (14 Jan 2020 07:00), Max: 37 (13 Jan 2020 15:00)  T(F): 97.3 (14 Jan 2020 07:00), Max: 98.6 (13 Jan 2020 15:00)  HR: 70 (14 Jan 2020 09:00) (54 - 108)  BP: --  BP(mean): --  RR: 22 (14 Jan 2020 09:00) (8 - 84)  SpO2: 100% (14 Jan 2020 09:00) (88% - 100%)    I&O's Summary    13 Jan 2020 07:01  -  14 Jan 2020 07:00  --------------------------------------------------------  IN: 1985.6 mL / OUT: 350 mL / NET: 1635.6 mL    14 Jan 2020 07:01  -  14 Jan 2020 09:17  --------------------------------------------------------  IN: 158.6 mL / OUT: 0 mL / NET: 158.6 mL        Telemetry past 24h: corrina ->af rvr ->tdp -> now primarily sr    PHYSICAL EXAM:    Constitutional: well-nourished, well-developed, sed  HEENT:  ett, sclerae anicteric, conjunctivae clear, no oral cyanosis.  Pulmonary: Non-labored, breath sounds are clear ant,    Cardiovascular: Regular, S1 and S2.  No murmur.  No rubs, gallops or clicks  Gastrointestinal: soft, nontender.   Lymph: modperipheral edema.   Neurological: sed  Skin: No rashes.  Psych:  sed    LABS: All Labs Reviewed:                        8.2    15.59 )-----------( 348      ( 14 Jan 2020 05:24 )             24.4                         8.7    21.17 )-----------( 353      ( 14 Jan 2020 01:12 )             25.3                         9.1    21.04 )-----------( 353      ( 13 Jan 2020 21:40 )             26.6     14 Jan 2020 05:24    136    |  98     |  34     ----------------------------<  91     4.5     |  25     |  5.95   14 Jan 2020 01:12    132    |  94     |  31     ----------------------------<  143    4.4     |  22     |  5.82   13 Jan 2020 21:40    135    |  96     |  36     ----------------------------<  211    4.7     |  23     |  5.79     Ca    8.7        14 Jan 2020 05:24  Ca    8.1        14 Jan 2020 01:12  Ca    7.4        13 Jan 2020 21:40  Phos  6.3       14 Jan 2020 05:24  Phos  6.7       14 Jan 2020 01:12  Phos  7.3       13 Jan 2020 21:40  Mg     2.8       14 Jan 2020 05:24  Mg     2.9       14 Jan 2020 01:12  Mg     2.9       13 Jan 2020 21:40    TPro  4.9    /  Alb  2.6    /  TBili  1.3    /  DBili  0.8    /  AST  49     /  ALT  10     /  AlkPhos  237    14 Jan 2020 05:24  TPro  5.1    /  Alb  2.6    /  TBili  1.6    /  DBili  1.0    /  AST  56     /  ALT  10     /  AlkPhos  271    14 Jan 2020 01:12  TPro  4.8    /  Alb  2.1    /  TBili  1.5    /  DBili  1.0    /  AST  77     /  ALT  11     /  AlkPhos  293    13 Jan 2020 21:40    PT/INR - ( 14 Jan 2020 05:24 )   PT: 13.4 sec;   INR: 1.17 ratio         PTT - ( 14 Jan 2020 05:24 )  PTT:93.8 sec  CARDIAC MARKERS ( 13 Jan 2020 21:40 )  x     / x     / 117 U/L / x     / 6.5 ng/mL      Blood Culture: Organism Enterobacter cloacae (Carbapenem Resistant)  Gram Stain Blood -- Gram Stain   No polymorphonuclear cells seen per low power field  No organisms seen per oil power field  Specimen Source .Tissue Other  Culture-Blood --            RADIOLOGY:    EKG:    Echo:    < from: Transthoracic Echocardiogram (01.13.20 @ 18:26) >    Patient name: KAYLA PA  YOB: 1970   Age: 49 (M)   MR#: [id number]  Study Date: 1/13/2020  Location: [address] [place], [place] [id number]: Rachana Plascencia [facility]  Study quality: Technically good  Referring Physician: Rebecca Cruz MD  Blood Pressure: 158/83 mmHg  Height: 180 cm  Weight: 64 kg  BSA: 1.8 m2  Heart Rate: 106 mmHg  ------------------------------------------------------------------------  PROCEDURE: Transthoracic echocardiogram with 2-D, M-Mode  and complete spectral and color flow Doppler.  INDICATION: Heart failure, unspecified (I50.9)  ------------------------------------------------------------------------  Dimensions:    Normal Values:  LA:     2.8    2.0 - 4.0 cm  Ao:     3.1    2.0 - 3.8 cm  SEPTUM: 0.9    0.6 - 1.2 cm  PWT:    0.7    0.6 - 1.1 cm  LVIDd:  3.5    3.0 - 5.6 cm  LVIDs:  2.5    1.8 - 4.0 cm  Derived variables:  LVMI: 41 g/m2  RWT: 0.40  Fractional short: 29 %  EF (Visual Estimate): 70-75 %  Doppler Peak Velocity (m/sec): AoV=1.4  ------------------------------------------------------------------------  Observations:  Mitral Valve: Normal mitral valve. Minimal mitral  regurgitation.  Aortic Valve/Aorta: Aortic valve not well visualized;  probably normal. Peak transaortic valve gradient equals 8  mmHg. Minimal aortic regurgitation.  Peak left ventricular  outflow tract gradient equals 3 mm Hg.  Aortic Root: 3.1 cm.  Left Atrium: Normal left atrium.  LA volume index = 12  cc/m2.  Left Ventricle: Hyperdynamic left ventricular systolic  function. Septal flattening consistent with right  ventricular overload. Normal left ventricular internal  dimensions and wall thicknesses. Normal diastolic function  Right Heart: Normal right atrium. Right ventricular  enlargement (4.5 cm at the base) with decreased right  ventricular systolic function. Crystal's signs is  present, which is may be suggestive of pulmonary embolism.  TAPSE = 1.4 cm.  Normal tricuspid valve. Mild-moderate  tricuspid regurgitation. Pulmonic valve not well  visualized, probably normal. Minimal pulmonic  regurgitation.  Pericardium/Pleura: Normal pericardium with no pericardial  effusion.  Hemodynamic: Estimated right ventricular systolic pressure  equals > 61.24 mm Hg, assuming right atrial pressure equals  > 15 mm Hg, consistent with moderate pulmonary  hypertension.  ------------------------------------------------------------------------  Conclusions:  1. Normal left ventricular internal dimensions and wall  thicknesses.  2. Hyperdynamic left ventricular systolic function. Septal  flattening consistent with right ventricular overload.  3. Right ventricular enlargement (4.5 cm at the base) with  decreased right ventricular systolic function. Crystal's  signs is present, which is may be suggestive of pulmonary  embolism. TAPSE = 1.4 cm.  4. Estimated right ventricular systolic pressure equals >  61.24 mm Hg, assuming right atrial pressure equals > 15 mm  Hg, consistent with moderate pulmonary hypertension.  5. Normal tricuspid valve. Mild-moderate tricuspid  regurgitation.  *** No previous Echo exam.  ------------------------------------------------------------------------  Confirmed on  1/13/2020 - 19:20:39 by Shakir Dowling M.D.  ------------------------------------------------------------------------    < end of copied text >

## 2020-01-14 NOTE — CHART NOTE - NSCHARTNOTEFT_GEN_A_CORE
Nutrition Follow Up Note  Patient seen for: SICU f/u    Chart reviewed, events noted. Pt is S/P PEA arrest  2/2 saddle PE. Intubated. last HD .    Source: chart, team    Diet :  NPO    (previous diet rx was renal)    GI: no vomiting, no abd distention, last BM     Daily Weight in k (), Weight in k.5 (), Weight in k.5 (), Weight in k.5 (), Weight in k (), Weight in k.7 (01-10), Weight in k.3 ()  wt changes likely 2/2 fluid shifts, on HD, has 3+ right leg, ankle; 2+ left leg, ankle edema.    Pertinent Medications: MEDICATIONS  (STANDING):  aMIOdarone Infusion 1 mG/Min (33.333 mL/Hr) IV Continuous <Continuous>  aMIOdarone Infusion 0.5 mG/Min (16.667 mL/Hr) IV Continuous <Continuous>  brimonidine 0.2% Ophthalmic Solution 1 Drop(s) Both EYES three times a day  chlorhexidine 0.12% Liquid 15 milliLiter(s) Oral Mucosa <User Schedule>  chlorhexidine 2% Cloths 1 Application(s) Topical <User Schedule>  ciprofloxacin   IVPB 400 milliGRAM(s) IV Intermittent every 24 hours  fentaNYL   Infusion. 0.5 MICROgram(s)/kG/Hr (1.587 mL/Hr) IV Continuous <Continuous>  fluconAZOLE IVPB 400 milliGRAM(s) IV Intermittent every 24 hours  heparin  Infusion 1000 Unit(s)/Hr (9 mL/Hr) IV Continuous <Continuous>  latanoprost 0.005% Ophthalmic Solution 1 Drop(s) Both EYES daily  LORazepam Infusion 0.031 mG/kG/Hr (2 mL/Hr) IV Continuous <Continuous>  meropenem  IVPB 500 milliGRAM(s) IV Intermittent every 24 hours  methylPREDNISolone sodium succinate Injectable 4 milliGRAM(s) IV Push daily  norepinephrine Infusion 0.02 MICROgram(s)/kG/Min (2.381 mL/Hr) IV Continuous <Continuous>  pantoprazole  Injectable 40 milliGRAM(s) IV Push daily  petrolatum Ophthalmic Ointment 1 Application(s) Both EYES daily  sodium chloride 0.9%. 1000 milliLiter(s) (50 mL/Hr) IV Continuous <Continuous>    MEDICATIONS  (PRN):  HYDROmorphone  Injectable 0.5 milliGRAM(s) IV Push every 3 hours PRN Breakthrough Pain     @ 05:24: Na 136, BUN 34<H>, Cr 5.95<H>, BG 91, K+ 4.5, Phos 6.3<H>, Mg 2.8<H>, Alk Phos 237<H>, ALT/SGPT 10, AST/SGOT 49<H>, HbA1c --   @ 01:12: Na 132<L>, BUN 31<H>, Cr 5.82<H>, <H>, K+ 4.4, Phos 6.7<H>, Mg 2.9<H>, Alk Phos 271<H>, ALT/SGPT 10, AST/SGOT 56<H>, HbA1c --   @ 22:59: Na --, BUN --, Cr --, BG --, K+ --, Phos --, Mg --, Alk Phos --, ALT/SGPT --, AST/SGOT --, HbA1c 5.2   @ 21:40: Na 135, BUN 36<H>, Cr 5.79<H>, <H>, K+ 4.7, Phos 7.3<H>, Mg 2.9<H>, Alk Phos 293<H>, ALT/SGPT 11, AST/SGOT 77<H>, HbA1c --   @ 18:20: Na 133<L>, BUN 29<H>, Cr 5.82<H>, <H>, K+ 5.1, Phos 6.2<H>, Mg 2.5, Alk Phos 105, ALT/SGPT 5<L>, AST/SGOT 10, HbA1c --    Finger Sticks:      Skin per nursing documentation: no pressure injuries documented, has RLQ wound vac      Estimated Needs: reassessed, pt intubated using pt's usual wt 63.3 kg  Estimated calorie needs for intubated pt per [employer] ([employer])   1694 ritika/day (~27kcals/kg),  protein needs 76-88 gm (1.2-1.4gm/kg)    Previous Nutrition Diagnosis: 1) Increased Nutrient Needs 2) Limited adherence to nutrition therapy education  Nutrition Diagnosis is: ongoing, being addressed with therapeutic diet and nutrition education reinforcement. Pt currently NPO.      New Nutrition Diagnosis: none  Related to:    As evidenced by:      Interventions:     Recommend  1) if pt remains intubated, and EN initiated, recommend Nepro 40cc/hr x 24 hrs to provide 1728 kcals, 78 gm protein, 700cc free water meets 27 kcals/kg, 1.2 gm protein/kg IBW 63.3 kg  2) add bowel regimen    Monitoring and Evaluation:     Continue to monitor Nutritional intake, Tolerance to diet prescription, weights, labs, skin integrity    RD remains available upon request and will follow up per protocol

## 2020-01-14 NOTE — PROGRESS NOTE ADULT - ASSESSMENT
49M PMHx of ESRD on HD  ( via LUE AVF), HTN, Gout, Glaucoma, NET of pancreas, RA with hand contractures. s/p DDRT 12/8/19,  Post op course c/b DGF requiring HD. Renal bx x 2 c/w profound ATN.     Re-admitted with Influenza and perinephric hematoma. s/p hematoma evacuation/ abd washout and renal biopsy. Post op course c/b leukocytosis, infected hematoma, acute RLE DVT, and bleeding pseudoaneurysm at txp renal artery anastomosis. Now s/p graft nephrectomy.     [] DDRT c/b DGF/ATN/perinephric infected hematoma/pseudoneurysm/graft nephrectomy   - off immunosupression, decrease prednisone to 2.5mg QD and will plan to DC soon  - off  valcyte/bactrim  - HD per nephrology as scheduled  - ID following: cont zora/cipro/fluc. f/u OR cx from 1/10 (growing enterobacter), received amikacin 1/7 and 1/8   - advance diet as  tolerated  - pain control  - bowel regimen  - PT/OT/OOB  - Wound vac change (Monday)    [] New acute DVT of R external iliac, common femoral, femoral veins, R soleal vein requiring heparin drip  - IVC filter placed 1/9  - Vascular following: continue heparin gtt, change APTT goal to 50-60    [] Dispo  - continue SICU care 49M PMHx of ESRD on HD  ( via LUE AVF), HTN, Gout, Glaucoma, NET of pancreas, RA with hand contractures. s/p DDRT 12/8/19,  Post op course c/b DGF requiring HD. Renal bx x 2 c/w profound ATN.     Re-admitted with Influenza and perinephric hematoma. s/p hematoma evacuation/ abd washout and renal biopsy. Post op course c/b leukocytosis, infected hematoma, acute RLE DVT, and bleeding pseudoaneurysm at txp renal artery anastomosis. Now s/p graft nephrectomy. Course further complicated by saddle PE causing right heart strain and hemodynamic instability leading to cardiac arrest requiring ACLS, now with ROSC and intubated    [] DDRT c/b DGF/ATN/perinephric infected hematoma/pseudoneurysm/graft nephrectomy   - off immunosupression, decrease prednisone to 2.5mg QD and will plan to DC soon  - off  valcyte/bactrim  - HD per nephrology as scheduled  - ID following: cont zora/cipro/fluc. f/u OR cx from 1/10 (growing enterobacter), received amikacin 1/7 and 1/8   - pain control  - bowel regimen  - PT/OT/OOB  - Wound vac change (Wednesday)    [] Saddle PE, with right heart strain and complicated by PEA arrest followed by Torsade, ROSC achieved after ACLS  - vascular, CT and vascular cardiology consulted  - continue anticoagulation and further plan (possible AngioJet) pending review of CT scan from overnight    [] New acute DVT of R external iliac, common femoral, femoral veins, R soleal vein requiring heparin drip  - IVC filter placed 1/9, on heparin drip     [] Dispo  - continue SICU care 49M PMHx of ESRD on HD  ( via LUE AVF), HTN, Gout, Glaucoma, NET of pancreas, RA with hand contractures. s/p DDRT 12/8/19,  Post op course c/b DGF requiring HD. Renal bx x 2 c/w profound ATN.     Re-admitted with Influenza and perinephric hematoma. s/p hematoma evacuation/ abd washout and renal biopsy. Post op course c/b leukocytosis, infected hematoma, acute RLE DVT, and bleeding pseudoaneurysm at txp renal artery anastomosis. Now s/p graft nephrectomy. Course further complicated by saddle PE causing right heart strain and hemodynamic instability leading to cardiac arrest requiring ACLS, now with ROSC and intubated    [] DDRT c/b DGF/ATN/perinephric infected hematoma/pseudoneurysm/graft nephrectomy   - new fluid collection noted on CT from overnight, defer drainage until more pressing issue with PE has been adequately addressed and patient is more stable  - off immunosupression, decrease prednisone to 2.5mg QD and will plan to DC soon  - off  valcyte/bactrim  - HD per nephrology as scheduled  - ID following: cont zora/cipro/fluc. f/u OR cx from 1/10 (growing enterobacter), received amikacin 1/7 and 1/8   - pain control  - bowel regimen  - PT/OT/OOB  - Wound vac change (Wednesday)    [] Saddle PE, with right heart strain and complicated by PEA arrest followed by Torsade, ROSC achieved after ACLS  - vascular, CT and vascular cardiology consulted  - continue anticoagulation and further plan (possible AngioJet) pending review of CT scan from overnight    [] New acute DVT of R external iliac, common femoral, femoral veins, R soleal vein requiring heparin drip  - IVC filter placed 1/9, on heparin drip     [] Dispo  - continue SICU care

## 2020-01-14 NOTE — CONSULT NOTE ADULT - SUBJECTIVE AND OBJECTIVE BOX
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  Carpal tunnel syndrome  Brain cyst: had MRI recently- now gone  Anemia  Renal insufficiency  Gastric ulcer: Long time ago  Rheumatoid arthritis      Past Surgical History  Renal transplant recipient  Breakdown (mechanical) of penile (implanted) prosthesis, sequela  End-stage renal disease (ESRD): PERMACATH PLACEMENT  Abscess of left buttock: s/p I &amp; D  AV fistula: placement left lower arm  S/P cystoscopy: stent placement &amp; removal for kidney stones, lithiotripsy  s/p Lt Carpal tunnel: 2011  Rheumatoid arthritis      MEDICATIONS  (STANDING):  aMIOdarone Infusion 1 mG/Min (33.333 mL/Hr) IV Continuous <Continuous>  aMIOdarone Infusion 0.5 mG/Min (16.667 mL/Hr) IV Continuous <Continuous>  brimonidine 0.2% Ophthalmic Solution 1 Drop(s) Both EYES three times a day  chlorhexidine 0.12% Liquid 15 milliLiter(s) Oral Mucosa <User Schedule>  chlorhexidine 2% Cloths 1 Application(s) Topical <User Schedule>  ciprofloxacin   IVPB 400 milliGRAM(s) IV Intermittent every 24 hours  fentaNYL   Infusion. 0.5 MICROgram(s)/kG/Hr (1.587 mL/Hr) IV Continuous <Continuous>  fluconAZOLE IVPB 400 milliGRAM(s) IV Intermittent every 24 hours  heparin  Infusion 1000 Unit(s)/Hr (10 mL/Hr) IV Continuous <Continuous>  latanoprost 0.005% Ophthalmic Solution 1 Drop(s) Both EYES daily  LORazepam Infusion 0.016 mG/kG/Hr (1 mL/Hr) IV Continuous <Continuous>  meropenem  IVPB 500 milliGRAM(s) IV Intermittent every 24 hours  methylPREDNISolone sodium succinate Injectable 4 milliGRAM(s) IV Push daily  norepinephrine Infusion 0.02 MICROgram(s)/kG/Min (2.381 mL/Hr) IV Continuous <Continuous>  pantoprazole  Injectable 40 milliGRAM(s) IV Push daily  petrolatum Ophthalmic Ointment 1 Application(s) Both EYES daily  sodium chloride 0.9%. 1000 milliLiter(s) (50 mL/Hr) IV Continuous <Continuous>    MEDICATIONS  (PRN):  HYDROmorphone  Injectable 0.5 milliGRAM(s) IV Push every 3 hours PRN Breakthrough Pain    Antiplatelet therapy:                           Last dose/amt:    Allergies: coconut (Anaphylaxis)  morphine (Pruritus; Rash)      SOCIAL HISTORY:  Smoker: [ ] Yes  [ ] No        PACK YEARS:                         WHEN QUIT?  ETOH use: [ ] Yes  [ ] No              FREQUENCY / QUANTITY:  Ilicit Drug use:  [ ] Yes  [ ] No  Occupation:  Live with:  Assist device use:    Relevant Family History  FAMILY HISTORY:  Family history of myocardial infarction in first degree male relative  Family history of rheumatoid arthritis  Family history of hypertension in mother  Family history of cerebral aneurysm (Sibling)      Review of Systems  GENERAL:  Fevers[] chills[] sweats[] fatigue[] weight loss[] weight gain []                                        NEURO:  parathesias[] seizures []  syncope []  confusion []                                                                                  EYES: glasses[]  blurry vision[]  discharge[] pain[] glaucoma []                                                                            ENMT:  difficulty hearing []  vertigo[]  dysphagia[] epistaxis[] recent dental work []                                      CV:  chest pain[] palpitations[] GRAY [] diaphoresis [] edema[]                                                                                             RESPIRATORY:  wheezing[] SOB[] cough [] sputum[] hemoptysis[]                                                                    GI:  nausea[]  vomiting []  diarrhea[] constipation [] melena []                                                                        : hematuria[ ]  dysuria[ ] urgency[] incontinence[]                                                                                              MUSKULOSKELETAL:  arthritis[ ]  joint swelling [ ] muscle weakness [ ]                                                                  SKIN/BREAST:  rash[ ] itching [ ]  hair loss[ ] masses[ ]                                                                                                PSYCH:  dementia [ ] depresion [ ] anxiety[ ]                                                                                                                  HEME/LYMPH:  bruises easily[ ] enlarged lymph nodes[ ] tender lymph nodes[ ]                                                 ENDOCRINE:  cold intolerance[ ] heat intolerance[ ] polydipsia[ ]                                                                              PHYSICAL EXAM  Vital Signs Last 24 Hrs  T(C): 36.4 (13 Jan 2020 23:00), Max: 37 (13 Jan 2020 15:00)  T(F): 97.5 (13 Jan 2020 23:00), Max: 98.6 (13 Jan 2020 15:00)  HR: 71 (14 Jan 2020 01:00) (54 - 108)  BP: 128/69 (13 Jan 2020 07:00) (128/69 - 128/69)  BP(mean): 93 (13 Jan 2020 07:00) (93 - 93)  RR: 20 (14 Jan 2020 01:00) (8 - 84)  SpO2: 100% (14 Jan 2020 01:00) (88% - 100%)    General: Well nourished, well developed, no acute distress.                                                         Neuro: Normal exam oriented to person/place & time with no focal motor or sensory  deficits.                    Eyes: Normal exam of conjunctiva & lids, pupils equally reactive.   ENT: Normal exam of nasal/oral mucosa with absence of cyanosis.   Neck: Normal exam of jugular veins, trachea & thyroid.   Chest: Normal lung exam with good air movement absence of wheezes, rales, or rhonchi:                                                                          CV:  Auscultation: normal [ ] S3[ ] S4[ ] Irregular [ ] Rub[ ] Clicks[ ]  Murmurs none:[ ]systolic [ ]  diastolic [ ] holosystolic [ ]  Carotids: No Bruits[ ] Other____________ Abdominal Aorta: normal [ ] nonpalpable[ ]                                                                         GI: Normal exam of abdomen, liver & spleen with no noted masses or tenderness.                                                                                              Extremities: Normal no evidence of cyanosis or deformity Edema: none[ ]trace[ ]1+[ ]2+[ ]3+[ ]4+[ ]  Lower Extremity Pulses: Right[ ] Left[ ]Varicosities[ ]  SKIN : Normal exam to inspection & palation.                                                           LABS:                        9.1    21.04 )-----------( 353      ( 13 Jan 2020 21:40 )             26.6     01-13    135  |  96  |  36<H>  ----------------------------<  211<H>  4.7   |  23  |  5.79<H>    Ca    7.4<L>      13 Jan 2020 21:40  Phos  7.3     01-13  Mg     2.9     01-13    TPro  4.8<L>  /  Alb  2.1<L>  /  TBili  1.5<H>  /  DBili  1.0<H>  /  AST  77<H>  /  ALT  11  /  AlkPhos  293<H>  01-13    PT/INR - ( 13 Jan 2020 21:40 )   PT: 12.8 sec;   INR: 1.12 ratio         PTT - ( 13 Jan 2020 21:40 )  PTT:98.9 sec    CARDIAC MARKERS ( 13 Jan 2020 21:40 )  x     / x     / 117 U/L / x     / 6.5 ng/mL    CT-A: preliminary +saddle PE      TTE / SHAWANDA: < from: Transthoracic Echocardiogram (01.13.20 @ 18:26) >  EF (Visual Estimate): 70-75 %  Doppler Peak Velocity (m/sec): AoV=1.4  ------------------------------------------------------------------------  Observations:  Mitral Valve: Normal mitral valve. Minimal mitral  regurgitation.  Aortic Valve/Aorta: Aortic valve not well visualized;  probably normal. Peak transaortic valve gradient equals 8  mmHg. Minimal aortic regurgitation.  Peak left ventricular  outflow tract gradient equals 3 mm Hg.  Aortic Root: 3.1 cm.  Left Atrium: Normal left atrium.  LA volume index = 12  cc/m2.  Left Ventricle: Hyperdynamic left ventricular systolic  function. Septal flattening consistent with right  ventricular overload. Normal left ventricular internal  dimensions and wall thicknesses. Normal diastolic function  Right Heart: Normal right atrium. Right ventricular  enlargement (4.5 cm at the base) with decreased right  ventricular systolic function. Crystal's signs is  present, which is may be suggestive of pulmonary embolism.  TAPSE = 1.4 cm.  Normal tricuspid valve. Mild-moderate  tricuspid regurgitation. Pulmonic valve not well  visualized, probably normal. Minimal pulmonic  regurgitation.  Pericardium/Pleura: Normal pericardium with no pericardial  effusion.  Hemodynamic: Estimated right ventricular systolic pressure  equals > 61.24 mm Hg, assuming right atrial pressure equals  > 15 mm Hg, consistent with moderate pulmonary  hypertension.    Conclusions:  1. Normal left ventricular internal dimensions and wall  thicknesses.  2. Hyperdynamic left ventricular systolic function. Septal  flattening consistent with right ventricular overload.  3. Right ventricular enlargement (4.5 cm at the base) with  decreased right ventricular systolic function. Crystal's  signs is present, which is may be suggestive of pulmonary  embolism. TAPSE = 1.4 cm.  4. Estimated right ventricular systolic pressure equals >  61.24 mm Hg, assuming right atrial pressure equals > 15 mm  Hg, consistent with moderate pulmonary hypertension.  5. Normal tricuspid valve. Mild-moderate tricuspid  regurgitation.  *** No previous Echo exam. 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  His post op course c/b graft failure and underwent renal bx on 12/13 c/w profound ATN.  He was ultimately discharged home on 12/20/19 w/ outpatient HD after adjustment of medications.     IOn 12/27, patient was admitted for fevers, and worsening renal failure.  Found to have perinephric hematoma and on 1/1/2020 underwent washout, evacuation of hematoma, and biopsy of transplanted kidney.  Post-op course complicated by reaccumulating hematoma in which IR was consulted for drainage.  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  He was started on heparin gtt.  Tonight (1/13) patient became bradycardic which declined into PEA arrest (?VT arrest)  requiring intubation, shock x2, and vasopressor support.  After event patient awake and followed commands.    Patient was sedated and stabilized on levophed/heparin gtt restarted.   Work up to reveal etiology of arrest included CT-A and TTE which demonstrated RV strain and CT-A with preliminary read of saddle PE.   Vascular Cardiology and PERT team ([facility]) consulted and recommended CT surgery consult for open thrombectomy.         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  Carpal tunnel syndrome  Brain cyst: had MRI recently- now gone  Anemia  Renal insufficiency  Gastric ulcer: Long time ago  Rheumatoid arthritis      Past Surgical History  Renal transplant recipient  Breakdown (mechanical) of penile (implanted) prosthesis, sequela  End-stage renal disease (ESRD): PERMACATH PLACEMENT  Abscess of left buttock: s/p I &amp; D  AV fistula: placement left lower arm  S/P cystoscopy: stent placement &amp; removal for kidney stones, lithiotripsy  s/p Lt Carpal tunnel: 2011  Rheumatoid arthritis      MEDICATIONS  (STANDING):  aMIOdarone Infusion 1 mG/Min (33.333 mL/Hr) IV Continuous <Continuous>  aMIOdarone Infusion 0.5 mG/Min (16.667 mL/Hr) IV Continuous <Continuous>  brimonidine 0.2% Ophthalmic Solution 1 Drop(s) Both EYES three times a day  chlorhexidine 0.12% Liquid 15 milliLiter(s) Oral Mucosa <User Schedule>  chlorhexidine 2% Cloths 1 Application(s) Topical <User Schedule>  ciprofloxacin   IVPB 400 milliGRAM(s) IV Intermittent every 24 hours  fentaNYL   Infusion. 0.5 MICROgram(s)/kG/Hr (1.587 mL/Hr) IV Continuous <Continuous>  fluconAZOLE IVPB 400 milliGRAM(s) IV Intermittent every 24 hours  heparin  Infusion 1000 Unit(s)/Hr (10 mL/Hr) IV Continuous <Continuous>  latanoprost 0.005% Ophthalmic Solution 1 Drop(s) Both EYES daily  LORazepam Infusion 0.016 mG/kG/Hr (1 mL/Hr) IV Continuous <Continuous>  meropenem  IVPB 500 milliGRAM(s) IV Intermittent every 24 hours  methylPREDNISolone sodium succinate Injectable 4 milliGRAM(s) IV Push daily  norepinephrine Infusion 0.02 MICROgram(s)/kG/Min (2.381 mL/Hr) IV Continuous <Continuous>  pantoprazole  Injectable 40 milliGRAM(s) IV Push daily  petrolatum Ophthalmic Ointment 1 Application(s) Both EYES daily  sodium chloride 0.9%. 1000 milliLiter(s) (50 mL/Hr) IV Continuous <Continuous>    MEDICATIONS  (PRN):  HYDROmorphone  Injectable 0.5 milliGRAM(s) IV Push every 3 hours PRN Breakthrough Pain    Antiplatelet therapy:                           Last dose/amt:    Allergies: coconut (Anaphylaxis)  morphine (Pruritus; Rash)      SOCIAL HISTORY:  Smoker: [ ] Yes  [ ] No        PACK YEARS:                         WHEN QUIT?  ETOH use: [ ] Yes  [ ] No              FREQUENCY / QUANTITY:  Ilicit Drug use:  [ ] Yes  [ ] No  Occupation:  Live with:  Assist device use:    Relevant Family History  FAMILY HISTORY:  Family history of myocardial infarction in first degree male relative  Family history of rheumatoid arthritis  Family history of hypertension in mother  Family history of cerebral aneurysm (Sibling)      Review of Systems  GENERAL:  Fevers[] chills[] sweats[] fatigue[] weight loss[] weight gain []                                        NEURO:  parathesias[] seizures []  syncope []  confusion []                                                                                  EYES: glasses[]  blurry vision[]  discharge[] pain[] glaucoma []                                                                            ENMT:  difficulty hearing []  vertigo[]  dysphagia[] epistaxis[] recent dental work []                                      CV:  chest pain[] palpitations[] GRAY [] diaphoresis [] edema[]                                                                                             RESPIRATORY:  wheezing[] SOB[] cough [] sputum[] hemoptysis[]                                                                    GI:  nausea[]  vomiting []  diarrhea[] constipation [] melena []                                                                        : hematuria[ ]  dysuria[ ] urgency[] incontinence[]                                                                                              MUSKULOSKELETAL:  arthritis[ ]  joint swelling [ ] muscle weakness [ ]                                                                  SKIN/BREAST:  rash[ ] itching [ ]  hair loss[ ] masses[ ]                                                                                                PSYCH:  dementia [ ] depresion [ ] anxiety[ ]                                                                                                                  HEME/LYMPH:  bruises easily[ ] enlarged lymph nodes[ ] tender lymph nodes[ ]                                                 ENDOCRINE:  cold intolerance[ ] heat intolerance[ ] polydipsia[ ]                                                                              PHYSICAL EXAM  Vital Signs Last 24 Hrs  T(C): 36.4 (13 Jan 2020 23:00), Max: 37 (13 Jan 2020 15:00)  T(F): 97.5 (13 Jan 2020 23:00), Max: 98.6 (13 Jan 2020 15:00)  HR: 71 (14 Jan 2020 01:00) (54 - 108)  BP: 128/69 (13 Jan 2020 07:00) (128/69 - 128/69)  BP(mean): 93 (13 Jan 2020 07:00) (93 - 93)  RR: 20 (14 Jan 2020 01:00) (8 - 84)  SpO2: 100% (14 Jan 2020 01:00) (88% - 100%)    General: Well nourished, well developed, no acute distress.                                                         Neuro: Normal exam oriented to person/place & time with no focal motor or sensory  deficits.                    Eyes: Normal exam of conjunctiva & lids, pupils equally reactive.   ENT: Normal exam of nasal/oral mucosa with absence of cyanosis.   Neck: Normal exam of jugular veins, trachea & thyroid.   Chest: Normal lung exam with good air movement absence of wheezes, rales, or rhonchi:                                                                          CV:  Auscultation: normal [ ] S3[ ] S4[ ] Irregular [ ] Rub[ ] Clicks[ ]  Murmurs none:[ ]systolic [ ]  diastolic [ ] holosystolic [ ]  Carotids: No Bruits[ ] Other____________ Abdominal Aorta: normal [ ] nonpalpable[ ]                                                                         GI: Normal exam of abdomen, liver & spleen with no noted masses or tenderness.                                                                                              Extremities: Normal no evidence of cyanosis or deformity Edema: none[ ]trace[ ]1+[ ]2+[ ]3+[ ]4+[ ]  Lower Extremity Pulses: Right[ ] Left[ ]Varicosities[ ]  SKIN : Normal exam to inspection & palation.                                                           LABS:                        9.1    21.04 )-----------( 353      ( 13 Jan 2020 21:40 )             26.6     01-13    135  |  96  |  36<H>  ----------------------------<  211<H>  4.7   |  23  |  5.79<H>    Ca    7.4<L>      13 Jan 2020 21:40  Phos  7.3     01-13  Mg     2.9     01-13    TPro  4.8<L>  /  Alb  2.1<L>  /  TBili  1.5<H>  /  DBili  1.0<H>  /  AST  77<H>  /  ALT  11  /  AlkPhos  293<H>  01-13    PT/INR - ( 13 Jan 2020 21:40 )   PT: 12.8 sec;   INR: 1.12 ratio         PTT - ( 13 Jan 2020 21:40 )  PTT:98.9 sec    CARDIAC MARKERS ( 13 Jan 2020 21:40 )  x     / x     / 117 U/L / x     / 6.5 ng/mL    CT-A: preliminary +saddle PE      TTE / SHAWANDA: < from: Transthoracic Echocardiogram (01.13.20 @ 18:26) >  EF (Visual Estimate): 70-75 %  Doppler Peak Velocity (m/sec): AoV=1.4  ------------------------------------------------------------------------  Observations:  Mitral Valve: Normal mitral valve. Minimal mitral  regurgitation.  Aortic Valve/Aorta: Aortic valve not well visualized;  probably normal. Peak transaortic valve gradient equals 8  mmHg. Minimal aortic regurgitation.  Peak left ventricular  outflow tract gradient equals 3 mm Hg.  Aortic Root: 3.1 cm.  Left Atrium: Normal left atrium.  LA volume index = 12  cc/m2.  Left Ventricle: Hyperdynamic left ventricular systolic  function. Septal flattening consistent with right  ventricular overload. Normal left ventricular internal  dimensions and wall thicknesses. Normal diastolic function  Right Heart: Normal right atrium. Right ventricular  enlargement (4.5 cm at the base) with decreased right  ventricular systolic function. Crystal's signs is  present, which is may be suggestive of pulmonary embolism.  TAPSE = 1.4 cm.  Normal tricuspid valve. Mild-moderate  tricuspid regurgitation. Pulmonic valve not well  visualized, probably normal. Minimal pulmonic  regurgitation.  Pericardium/Pleura: Normal pericardium with no pericardial  effusion.  Hemodynamic: Estimated right ventricular systolic pressure  equals > 61.24 mm Hg, assuming right atrial pressure equals  > 15 mm Hg, consistent with moderate pulmonary  hypertension.    Conclusions:  1. Normal left ventricular internal dimensions and wall  thicknesses.  2. Hyperdynamic left ventricular systolic function. Septal  flattening consistent with right ventricular overload.  3. Right ventricular enlargement (4.5 cm at the base) with  decreased right ventricular systolic function. Crystal's  signs is present, which is may be suggestive of pulmonary  embolism. TAPSE = 1.4 cm.  4. Estimated right ventricular systolic pressure equals >  61.24 mm Hg, assuming right atrial pressure equals > 15 mm  Hg, consistent with moderate pulmonary hypertension.  5. Normal tricuspid valve. Mild-moderate tricuspid  regurgitation.  *** No previous Echo exam. CC: Called by SICU team for abnormal CT-A with preliminary findings of Saddle PE    History of Present Illness:  49y Male with pmh of  HTN, Gout, Glaucoma, NET s/p robotic distal panc/splenectomy, RA with hand contractures and ESRD on HD via LAVF now s/p DDRT on 12/8/19. Post op course c/b DGF requiring HD, thrombocytopenia, elevated LDH and Haptoglobulin. Schistocytes  on peripheral smear concerning for TMA. Envarsus held. Received Belatacept 12/13. Started on PLEX (12/12, 12/15). Underwent renal bx on 12/13 c/w profound ATN.  Found to have much improvement to levels, likely related to Tacrolimus. He was discharged home on 12/20/19 w/ outpatient HD.   He was readmitted on 12/27 from HD center with cough and febrile to 101.7 after mother was hospitalized with flu.       On 1/1/2020 was taken to OR and found to have a perinephric hematoma s/p washout, evacuation of hematoma, and biopsy of transplanted kidney. He was recovering on the floor when overnight he spiked a fever to 38.1 and had 18 beats of wide complex tachycardia. He was also found to have worsening leukocytosis and became acutely hypotensive. A CT scan was obtained demonstrating a 4.5x3.7 perinephric collection. IR placed a drain on 1/6. Post-procedure, patient began having gross hematuria. CBI was started with improvement in hematuria. Repeat renal Duplex on 1/7 demonstrated no change in the perinephric collection. RLE was noted to be more swollen so a LE Duplex was obtained on 1/8, which demonstrated a right external iliac and common femoral DVT so a heparin infusion was started. However, patient had worsening hematuria w/ downtrending HCT. 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 A stent was placed to temporize the patient and he was taken to the OR early on 1/10 for an emergent right groin exploration, RP washout of hematoma, transplant nephrectomy, right external iliac artery repair w/ bovine patch, thrombectomy of the RLE veins, removal of the IR stent, and external iliac vein ligation. Case was uneventful and patient returned to SICU intubated and on vasopressor support. He was extubated and weaned off vasopressor support shortly after return to SICU.     This evening (1/13) at roughly 1730 patient became bradycardic which declined into PEA arrest (?VT arrest)  requiring intubation, shock x2, and vasopressor support.  After event patient awake and followed commands.    Patient was sedated and stabilized on levophed/heparin gtt restarted.   Work up to reveal etiology of arrest included CT-A and TTE which demonstrated RV strain and CT-A with preliminary read of saddle PE.   Vascular Cardiology and PERT team ([facility]) consulted and recommended CT surgery consult for open thrombectomy.         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  Carpal tunnel syndrome  Brain cyst: had MRI recently- now gone  Anemia  Renal insufficiency  Gastric ulcer: Long time ago  Rheumatoid arthritis      Past Surgical History  Renal transplant recipient  Breakdown (mechanical) of penile (implanted) prosthesis, sequela  End-stage renal disease (ESRD): PERMACATH PLACEMENT  Abscess of left buttock: s/p I &amp; D  AV fistula: placement left lower arm  S/P cystoscopy: stent placement &amp; removal for kidney stones, lithiotripsy  s/p Lt Carpal tunnel: 2011  Rheumatoid arthritis    ROS: unable to obtain due to sedation    MEDICATIONS  (STANDING):  aMIOdarone Infusion 0.5 mG/Min (16.667 mL/Hr) IV Continuous <Continuous>  brimonidine 0.2% Ophthalmic Solution 1 Drop(s) Both EYES three times a day  ciprofloxacin   IVPB 400 milliGRAM(s) IV Intermittent every 24 hours  fentaNYL   Infusion. 0.5 MICROgram(s)/kG/Hr (1.587 mL/Hr) IV Continuous <Continuous>  fluconAZOLE IVPB 400 milliGRAM(s) IV Intermittent every 24 hours  heparin  Infusion 1000 Unit(s)/Hr (10 mL/Hr) IV Continuous <Continuous>  latanoprost 0.005% Ophthalmic Solution 1 Drop(s) Both EYES daily  LORazepam Infusion 0.016 mG/kG/Hr (1 mL/Hr) IV Continuous <Continuous>  meropenem  IVPB 500 milliGRAM(s) IV Intermittent every 24 hours  methylPREDNISolone sodium succinate Injectable 4 milliGRAM(s) IV Push daily  norepinephrine Infusion 0.02 MICROgram(s)/kG/Min (2.381 mL/Hr) IV Continuous <Continuous>  pantoprazole  Injectable 40 milliGRAM(s) IV Push daily  petrolatum Ophthalmic Ointment 1 Application(s) Both EYES daily  sodium chloride 0.9%. 1000 milliLiter(s) (50 mL/Hr) IV Continuous <Continuous>    MEDICATIONS  (PRN):  HYDROmorphone  Injectable 0.5 milliGRAM(s) IV Push every 3 hours PRN Breakthrough Pain    Allergies: coconut (Anaphylaxis)  morphine (Pruritus; Rash)      SOCIAL HISTORY:  Smoker: [ ] Yes  [x ] No        PACK YEARS:                         WHEN QUIT?  ETOH use: [ ] Yes  [x ] No              FREQUENCY / QUANTITY:  Ilicit Drug use:  [ ] Yes  [x ] No  Occupation:   Live with:   Assist device use: none    Relevant Family History  FAMILY HISTORY:  Family history of myocardial infarction in first degree male relative  Family history of rheumatoid arthritis  Family history of hypertension in mother  Family history of cerebral aneurysm (Sibling)                                                                                  PHYSICAL EXAM  Vital Signs Last 24 Hrs  T(C): 36.4 (13 Jan 2020 23:00), Max: 37 (13 Jan 2020 15:00)  T(F): 97.5 (13 Jan 2020 23:00), Max: 98.6 (13 Jan 2020 15:00)  HR: 71 (14 Jan 2020 01:00) (54 - 108)  BP: 128/69 (13 Jan 2020 07:00) (128/69 - 128/69)  BP(mean): 93 (13 Jan 2020 07:00) (93 - 93)  RR: 20 (14 Jan 2020 01:00) (8 - 84)  SpO2: 100% (14 Jan 2020 01:00) (88% - 100%)    General: sedated/intubated                                                       Neuro: sedated, noted                     Eyes: PERRLA  ENT: ETT  Neck: +JVD   Chest: mechanically ventilated,                                                                          CV:  S1 S2 no murmur  Vascular access: L groin TLC, R radial arterial line                                                                      GI: Normal exam of abdomen, liver & spleen with no noted masses or tenderness.                                                                                              Extremities: Normal no evidence of cyanosis or deformity Edema: none[ ]trace[ ]1+[ ]2+[ ]3+[x]4+[ ]  Lower Extremity Pulses: +doppler  SKIN : Vac R abdomen with dark drng                                                          LABS:                        9.1    21.04 )-----------( 353      ( 13 Jan 2020 21:40 )             26.6     01-13    135  |  96  |  36<H>  ----------------------------<  211<H>  4.7   |  23  |  5.79<H>    Ca    7.4<L>      13 Jan 2020 21:40  Phos  7.3     01-13  Mg     2.9     01-13    TPro  4.8<L>  /  Alb  2.1<L>  /  TBili  1.5<H>  /  DBili  1.0<H>  /  AST  77<H>  /  ALT  11  /  AlkPhos  293<H>  01-13    PT/INR - ( 13 Jan 2020 21:40 )   PT: 12.8 sec;   INR: 1.12 ratio         PTT - ( 13 Jan 2020 21:40 )  PTT:98.9 sec    CARDIAC MARKERS ( 13 Jan 2020 21:40 )  x     / x     / 117 U/L / x     / 6.5 ng/mL    CT-A: preliminary +saddle PE      TTE / SHAWANDA: < from: Transthoracic Echocardiogram (01.13.20 @ 18:26) >  EF (Visual Estimate): 70-75 %  Doppler Peak Velocity (m/sec): AoV=1.4  ------------------------------------------------------------------------  Observations:  Mitral Valve: Normal mitral valve. Minimal mitral  regurgitation.  Aortic Valve/Aorta: Aortic valve not well visualized;  probably normal. Peak transaortic valve gradient equals 8  mmHg. Minimal aortic regurgitation.  Peak left ventricular  outflow tract gradient equals 3 mm Hg.  Aortic Root: 3.1 cm.  Left Atrium: Normal left atrium.  LA volume index = 12  cc/m2.  Left Ventricle: Hyperdynamic left ventricular systolic  function. Septal flattening consistent with right  ventricular overload. Normal left ventricular internal  dimensions and wall thicknesses. Normal diastolic function  Right Heart: Normal right atrium. Right ventricular  enlargement (4.5 cm at the base) with decreased right  ventricular systolic function. Crystal's signs is  present, which is may be suggestive of pulmonary embolism.  TAPSE = 1.4 cm.  Normal tricuspid valve. Mild-moderate  tricuspid regurgitation. Pulmonic valve not well  visualized, probably normal. Minimal pulmonic  regurgitation.  Pericardium/Pleura: Normal pericardium with no pericardial  effusion.  Hemodynamic: Estimated right ventricular systolic pressure  equals > 61.24 mm Hg, assuming right atrial pressure equals  > 15 mm Hg, consistent with moderate pulmonary  hypertension.    Conclusions:  1. Normal left ventricular internal dimensions and wall  thicknesses.  2. Hyperdynamic left ventricular systolic function. Septal  flattening consistent with right ventricular overload.  3. Right ventricular enlargement (4.5 cm at the base) with  decreased right ventricular systolic function. Crystal's  signs is present, which is may be suggestive of pulmonary  embolism. TAPSE = 1.4 cm.  4. Estimated right ventricular systolic pressure equals >  61.24 mm Hg, assuming right atrial pressure equals > 15 mm  Hg, consistent with moderate pulmonary hypertension.  5. Normal tricuspid valve. Mild-moderate tricuspid  regurgitation.  *** No previous Echo exam. CC: Called by SICU team for abnormal CT-A with preliminary findings of Saddle PE    History of Present Illness:  49y Male with pmh of  HTN, Gout, Glaucoma, NET s/p robotic distal panc/splenectomy, RA with hand contractures and ESRD on HD via LAVF now s/p DDRT on 12/8/19. Post op course c/b DGF requiring HD, thrombocytopenia, elevated LDH and Haptoglobulin. Schistocytes  on peripheral smear concerning for TMA. Envarsus held. Received Belatacept 12/13. Started on PLEX (12/12, 12/15). Underwent renal bx on 12/13 c/w profound ATN.  Found to have much improvement to levels, likely related to Tacrolimus. He was discharged home on 12/20/19 w/ outpatient HD.   He was readmitted on 12/27 from HD center with cough and febrile to 101.7 after mother was hospitalized with flu.       Hospital events:  1/1/2020:  OR- perinephric hematoma s/p washout, evacuation of hematoma, and biopsy of transplanted kidney.   1/1/20 PM: Febrile/septic shock along with 18 beats of wide complex tachycardia.  CT with  4.5x3.7 perinephric collection.   1/6: IR drainage of perinephric collection c/b gross hematuria requiring CBI   1/7: Renal Duplex: no change in the perinephric collection  1/8: LE Duplex for increased RLE swelling: + right external iliac and common femoral DVT,  heparin initiated  1/9: Worsening hematuria/acute anemia s/p IR angiogram + actively bleeding pseudoaneurysm involving the right external iliac and transplant renal artery s/p stent  1/10: OR for ER right groin exploration, RP washout of hematoma, transplant nephrectomy, right external iliac artery repair w/ bovine patch, thrombectomy of the RLE veins, removal of the IR stent, and external iliac vein ligation.  He was extubated and weaned off vasopressor support shortly after return to SICU.   1/13: 1730 patient became bradycardic which declined into PEA arrest (?VT arrest)  requiring intubation, shock x2, and vasopressor support.  After event patient awake and followed commands.    Patient was sedated and stabilized on levophed/heparin gtt restarted.   Work up to reveal etiology of arrest included CT-A and TTE which demonstrated RV strain and CT-A with preliminary read of saddle PE.       Vascular Cardiology and PERT team ([facility]) consulted and recommended CT surgery consult for open thrombectomy.         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  Carpal tunnel syndrome  Brain cyst: had MRI recently- now gone  Anemia  Renal insufficiency  Gastric ulcer: Long time ago  Rheumatoid arthritis      Past Surgical History  Renal transplant recipient  Breakdown (mechanical) of penile (implanted) prosthesis, sequela  End-stage renal disease (ESRD): PERMACATH PLACEMENT  Abscess of left buttock: s/p I &amp; D  AV fistula: placement left lower arm  S/P cystoscopy: stent placement &amp; removal for kidney stones, lithiotripsy  s/p Lt Carpal tunnel: 2011  Rheumatoid arthritis    ROS: unable to obtain due to sedation    MEDICATIONS  (STANDING):  aMIOdarone Infusion 0.5 mG/Min (16.667 mL/Hr) IV Continuous <Continuous>  brimonidine 0.2% Ophthalmic Solution 1 Drop(s) Both EYES three times a day  ciprofloxacin   IVPB 400 milliGRAM(s) IV Intermittent every 24 hours  fentaNYL   Infusion. 0.5 MICROgram(s)/kG/Hr (1.587 mL/Hr) IV Continuous <Continuous>  fluconAZOLE IVPB 400 milliGRAM(s) IV Intermittent every 24 hours  heparin  Infusion 1000 Unit(s)/Hr (10 mL/Hr) IV Continuous <Continuous>  latanoprost 0.005% Ophthalmic Solution 1 Drop(s) Both EYES daily  LORazepam Infusion 0.016 mG/kG/Hr (1 mL/Hr) IV Continuous <Continuous>  meropenem  IVPB 500 milliGRAM(s) IV Intermittent every 24 hours  methylPREDNISolone sodium succinate Injectable 4 milliGRAM(s) IV Push daily  norepinephrine Infusion 0.02 MICROgram(s)/kG/Min (2.381 mL/Hr) IV Continuous <Continuous>  pantoprazole  Injectable 40 milliGRAM(s) IV Push daily  petrolatum Ophthalmic Ointment 1 Application(s) Both EYES daily  sodium chloride 0.9%. 1000 milliLiter(s) (50 mL/Hr) IV Continuous <Continuous>    MEDICATIONS  (PRN):  HYDROmorphone  Injectable 0.5 milliGRAM(s) IV Push every 3 hours PRN Breakthrough Pain    Allergies: coconut (Anaphylaxis)  morphine (Pruritus; Rash)      SOCIAL HISTORY:  Smoker: [ ] Yes  [x ] No        PACK YEARS:                         WHEN QUIT?  ETOH use: [ ] Yes  [x ] No              FREQUENCY / QUANTITY:  Ilicit Drug use:  [ ] Yes  [x ] No  Occupation:   Live with:   Assist device use: none    Relevant Family History  FAMILY HISTORY:  Family history of myocardial infarction in first degree male relative  Family history of rheumatoid arthritis  Family history of hypertension in mother  Family history of cerebral aneurysm (Sibling)                                                                                  PHYSICAL EXAM  Vital Signs Last 24 Hrs  T(C): 36.4 (13 Jan 2020 23:00), Max: 37 (13 Jan 2020 15:00)  T(F): 97.5 (13 Jan 2020 23:00), Max: 98.6 (13 Jan 2020 15:00)  HR: 71 (14 Jan 2020 01:00) (54 - 108)  BP: 128/69 (13 Jan 2020 07:00) (128/69 - 128/69)  BP(mean): 93 (13 Jan 2020 07:00) (93 - 93)  RR: 20 (14 Jan 2020 01:00) (8 - 84)  SpO2: 100% (14 Jan 2020 01:00) (88% - 100%)    General: sedated/intubated                                                       Neuro: sedated, noted                     Eyes: PERRLA  ENT: ETT  Neck: +JVD   Chest: mechanically ventilated,                                                                          CV:  S1 S2 no murmur  Vascular access: L groin TLC, R radial arterial line                                                                      GI: Normal exam of abdomen, liver & spleen with no noted masses or tenderness.                                                                                              Extremities: Normal no evidence of cyanosis or deformity Edema: none[ ]trace[ ]1+[ ]2+[ ]3+[x]4+[ ]  Lower Extremity Pulses: +doppler  SKIN : Vac R abdomen with dark drng                                                          LABS:                        9.1    21.04 )-----------( 353      ( 13 Jan 2020 21:40 )             26.6     01-13    135  |  96  |  36<H>  ----------------------------<  211<H>  4.7   |  23  |  5.79<H>    Ca    7.4<L>      13 Jan 2020 21:40  Phos  7.3     01-13  Mg     2.9     01-13    TPro  4.8<L>  /  Alb  2.1<L>  /  TBili  1.5<H>  /  DBili  1.0<H>  /  AST  77<H>  /  ALT  11  /  AlkPhos  293<H>  01-13    PT/INR - ( 13 Jan 2020 21:40 )   PT: 12.8 sec;   INR: 1.12 ratio         PTT - ( 13 Jan 2020 21:40 )  PTT:98.9 sec    CARDIAC MARKERS ( 13 Jan 2020 21:40 )  x     / x     / 117 U/L / x     / 6.5 ng/mL    CT-A: preliminary +saddle PE      TTE / SHAWANDA: < from: Transthoracic Echocardiogram (01.13.20 @ 18:26) >  EF (Visual Estimate): 70-75 %  Doppler Peak Velocity (m/sec): AoV=1.4  ------------------------------------------------------------------------  Observations:  Mitral Valve: Normal mitral valve. Minimal mitral  regurgitation.  Aortic Valve/Aorta: Aortic valve not well visualized;  probably normal. Peak transaortic valve gradient equals 8  mmHg. Minimal aortic regurgitation.  Peak left ventricular  outflow tract gradient equals 3 mm Hg.  Aortic Root: 3.1 cm.  Left Atrium: Normal left atrium.  LA volume index = 12  cc/m2.  Left Ventricle: Hyperdynamic left ventricular systolic  function. Septal flattening consistent with right  ventricular overload. Normal left ventricular internal  dimensions and wall thicknesses. Normal diastolic function  Right Heart: Normal right atrium. Right ventricular  enlargement (4.5 cm at the base) with decreased right  ventricular systolic function. Crystal's signs is  present, which is may be suggestive of pulmonary embolism.  TAPSE = 1.4 cm.  Normal tricuspid valve. Mild-moderate  tricuspid regurgitation. Pulmonic valve not well  visualized, probably normal. Minimal pulmonic  regurgitation.  Pericardium/Pleura: Normal pericardium with no pericardial  effusion.  Hemodynamic: Estimated right ventricular systolic pressure  equals > 61.24 mm Hg, assuming right atrial pressure equals  > 15 mm Hg, consistent with moderate pulmonary  hypertension.    Conclusions:  1. Normal left ventricular internal dimensions and wall  thicknesses.  2. Hyperdynamic left ventricular systolic function. Septal  flattening consistent with right ventricular overload.  3. Right ventricular enlargement (4.5 cm at the base) with  decreased right ventricular systolic function. Crystal's  signs is present, which is may be suggestive of pulmonary  embolism. TAPSE = 1.4 cm.  4. Estimated right ventricular systolic pressure equals >  61.24 mm Hg, assuming right atrial pressure equals > 15 mm  Hg, consistent with moderate pulmonary hypertension.  5. Normal tricuspid valve. Mild-moderate tricuspid  regurgitation.  *** No previous Echo exam. CC: Called by SICU team for abnormal CT-A with preliminary findings of Saddle PE    History of Present Illness:  49y Male with pmh of  HTN, Gout, Glaucoma, NET s/p robotic distal panc/splenectomy, RA with hand contractures and ESRD on HD via LAVF now s/p DDRT on 12/8/19. Post op course c/b DGF requiring HD, thrombocytopenia, elevated LDH and Haptoglobulin. Schistocytes  on peripheral smear concerning for TMA. Envarsus held. Received Belatacept 12/13. Started on PLEX (12/12, 12/15). Underwent renal bx on 12/13 c/w profound ATN.  Found to have much improvement to levels, likely related to Tacrolimus. He was discharged home on 12/20/19 w/ outpatient HD.   He was readmitted on 12/27 from HD center with cough and febrile to 101.7 after mother was hospitalized with flu.       Hospital events:  1/1/2020:  OR- perinephric hematoma s/p washout, evacuation of hematoma, and biopsy of transplanted kidney.   1/1/20 PM: Febrile/septic shock along with 18 beats of wide complex tachycardia.  CT with  4.5x3.7 perinephric collection.   1/6: IR drainage of perinephric collection c/b gross hematuria requiring CBI   1/7: Renal Duplex: no change in the perinephric collection  1/8: LE Duplex for increased RLE swelling: + right external iliac and common femoral DVT,  heparin initiated  1/9: Worsening hematuria/acute anemia s/p IR angiogram + actively bleeding pseudoaneurysm involving the right external iliac and transplant renal artery s/p stent  1/10: OR for ER right groin exploration, RP washout of hematoma, transplant nephrectomy, right external iliac artery repair w/ bovine patch, thrombectomy of the RLE veins, removal of the IR stent, and external iliac vein ligation.  He was extubated and weaned off vasopressor support shortly after return to SICU.   1/13: 1730 patient became bradycardic which declined into Torsades then PEA arrest requiring intubation, shock x2, and vasopressor support.  After event patient awake and followed commands.    Patient was sedated and stabilized on levophed/heparin gtt restarted.   Work up to reveal etiology of arrest included CT-A and TTE which demonstrated RV strain and CT-A with preliminary read of saddle PE.       Vascular Cardiology and PERT team ([facility]) consulted and recommended CT surgery consult for open thrombectomy.         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  Carpal tunnel syndrome  Brain cyst: had MRI recently- now gone  Anemia  Renal insufficiency  Gastric ulcer: Long time ago  Rheumatoid arthritis      Past Surgical History  Renal transplant recipient  Breakdown (mechanical) of penile (implanted) prosthesis, sequela  End-stage renal disease (ESRD): PERMACATH PLACEMENT  Abscess of left buttock: s/p I &amp; D  AV fistula: placement left lower arm  S/P cystoscopy: stent placement &amp; removal for kidney stones, lithiotripsy  s/p Lt Carpal tunnel: 2011  Rheumatoid arthritis    ROS: unable to obtain due to sedation    MEDICATIONS  (STANDING):  aMIOdarone Infusion 0.5 mG/Min (16.667 mL/Hr) IV Continuous <Continuous>  brimonidine 0.2% Ophthalmic Solution 1 Drop(s) Both EYES three times a day  ciprofloxacin   IVPB 400 milliGRAM(s) IV Intermittent every 24 hours  fentaNYL   Infusion. 0.5 MICROgram(s)/kG/Hr (1.587 mL/Hr) IV Continuous <Continuous>  fluconAZOLE IVPB 400 milliGRAM(s) IV Intermittent every 24 hours  heparin  Infusion 1000 Unit(s)/Hr (10 mL/Hr) IV Continuous <Continuous>  latanoprost 0.005% Ophthalmic Solution 1 Drop(s) Both EYES daily  LORazepam Infusion 0.016 mG/kG/Hr (1 mL/Hr) IV Continuous <Continuous>  meropenem  IVPB 500 milliGRAM(s) IV Intermittent every 24 hours  methylPREDNISolone sodium succinate Injectable 4 milliGRAM(s) IV Push daily  norepinephrine Infusion 0.02 MICROgram(s)/kG/Min (2.381 mL/Hr) IV Continuous <Continuous>  pantoprazole  Injectable 40 milliGRAM(s) IV Push daily  petrolatum Ophthalmic Ointment 1 Application(s) Both EYES daily  sodium chloride 0.9%. 1000 milliLiter(s) (50 mL/Hr) IV Continuous <Continuous>    MEDICATIONS  (PRN):  HYDROmorphone  Injectable 0.5 milliGRAM(s) IV Push every 3 hours PRN Breakthrough Pain    Allergies: coconut (Anaphylaxis)  morphine (Pruritus; Rash)      SOCIAL HISTORY:  Smoker: [ ] Yes  [x ] No        PACK YEARS:                         WHEN QUIT?  ETOH use: [ ] Yes  [x ] No              FREQUENCY / QUANTITY:  Ilicit Drug use:  [ ] Yes  [x ] No  Occupation:   Live with:   Assist device use: none    Relevant Family History  FAMILY HISTORY:  Family history of myocardial infarction in first degree male relative  Family history of rheumatoid arthritis  Family history of hypertension in mother  Family history of cerebral aneurysm (Sibling)                                                                                  PHYSICAL EXAM  Vital Signs Last 24 Hrs  T(C): 36.4 (13 Jan 2020 23:00), Max: 37 (13 Jan 2020 15:00)  T(F): 97.5 (13 Jan 2020 23:00), Max: 98.6 (13 Jan 2020 15:00)  HR: 71 (14 Jan 2020 01:00) (54 - 108)  BP: 128/69 (13 Jan 2020 07:00) (128/69 - 128/69)  BP(mean): 93 (13 Jan 2020 07:00) (93 - 93)  RR: 20 (14 Jan 2020 01:00) (8 - 84)  SpO2: 100% (14 Jan 2020 01:00) (88% - 100%)    General: sedated/intubated                                                       Neuro: sedated, noted                     Eyes: PERRLA  ENT: ETT  Neck: +JVD   Chest: mechanically ventilated,                                                                          CV:  S1 S2 no murmur  Vascular access: L groin TLC, R radial arterial line                                                                      GI: Normal exam of abdomen, liver & spleen with no noted masses or tenderness.                                                                                              Extremities: Normal no evidence of cyanosis or deformity Edema: none[ ]trace[ ]1+[ ]2+[ ]3+[x]4+[ ]  Lower Extremity Pulses: +doppler  SKIN : Vac R abdomen with dark drng                                                          LABS:                        9.1    21.04 )-----------( 353      ( 13 Jan 2020 21:40 )             26.6     01-13    135  |  96  |  36<H>  ----------------------------<  211<H>  4.7   |  23  |  5.79<H>    Ca    7.4<L>      13 Jan 2020 21:40  Phos  7.3     01-13  Mg     2.9     01-13    TPro  4.8<L>  /  Alb  2.1<L>  /  TBili  1.5<H>  /  DBili  1.0<H>  /  AST  77<H>  /  ALT  11  /  AlkPhos  293<H>  01-13    PT/INR - ( 13 Jan 2020 21:40 )   PT: 12.8 sec;   INR: 1.12 ratio         PTT - ( 13 Jan 2020 21:40 )  PTT:98.9 sec    CARDIAC MARKERS ( 13 Jan 2020 21:40 )  x     / x     / 117 U/L / x     / 6.5 ng/mL    CT-A: preliminary +saddle PE      TTE / SHAWANDA: < from: Transthoracic Echocardiogram (01.13.20 @ 18:26) >  EF (Visual Estimate): 70-75 %  Doppler Peak Velocity (m/sec): AoV=1.4  ------------------------------------------------------------------------  Observations:  Mitral Valve: Normal mitral valve. Minimal mitral  regurgitation.  Aortic Valve/Aorta: Aortic valve not well visualized;  probably normal. Peak transaortic valve gradient equals 8  mmHg. Minimal aortic regurgitation.  Peak left ventricular  outflow tract gradient equals 3 mm Hg.  Aortic Root: 3.1 cm.  Left Atrium: Normal left atrium.  LA volume index = 12  cc/m2.  Left Ventricle: Hyperdynamic left ventricular systolic  function. Septal flattening consistent with right  ventricular overload. Normal left ventricular internal  dimensions and wall thicknesses. Normal diastolic function  Right Heart: Normal right atrium. Right ventricular  enlargement (4.5 cm at the base) with decreased right  ventricular systolic function. Crystal's signs is  present, which is may be suggestive of pulmonary embolism.  TAPSE = 1.4 cm.  Normal tricuspid valve. Mild-moderate  tricuspid regurgitation. Pulmonic valve not well  visualized, probably normal. Minimal pulmonic  regurgitation.  Pericardium/Pleura: Normal pericardium with no pericardial  effusion.  Hemodynamic: Estimated right ventricular systolic pressure  equals > 61.24 mm Hg, assuming right atrial pressure equals  > 15 mm Hg, consistent with moderate pulmonary  hypertension.    Conclusions:  1. Normal left ventricular internal dimensions and wall  thicknesses.  2. Hyperdynamic left ventricular systolic function. Septal  flattening consistent with right ventricular overload.  3. Right ventricular enlargement (4.5 cm at the base) with  decreased right ventricular systolic function. Crystal's  signs is present, which is may be suggestive of pulmonary  embolism. TAPSE = 1.4 cm.  4. Estimated right ventricular systolic pressure equals >  61.24 mm Hg, assuming right atrial pressure equals > 15 mm  Hg, consistent with moderate pulmonary hypertension.  5. Normal tricuspid valve. Mild-moderate tricuspid  regurgitation.  *** No previous Echo exam.

## 2020-01-14 NOTE — PROGRESS NOTE ADULT - ATTENDING COMMENTS
Seen this morning  Showing signs of clinical stability with normal BP and HR.  He is on MINIMAL oxgyen settings FI02 of 30 on the vent  discussed in detail with Shawn Espinal    Options are limited:  Catheter directed lysis will be risky given rescent surgery and bleeding risk  Catheter directed mechanical thrombectomy would be complex as well, as the IVC filter is there, and it has thrombus.  Also, it is good only for proximal clot and not distal PE.  So not sure how much benefit we can offer with this technology.  Seems risky.        Another option would be surgical thrombecomy (which would need a large bolus of heparin and further increase his bleeding risk)    Hopefully he continues to show stability on heparin gtt alone.  If he is to decompensate, then supportive care with pressors and ionotropes.      Shivani [id number]

## 2020-01-14 NOTE — PROGRESS NOTE ADULT - SUBJECTIVE AND OBJECTIVE BOX
Seen in SICU, intubated, events of last night noted    Vital Signs Last 24 Hrs  T(C): 36.3 (01-14-20 @ 15:10), Max: 36.5 (01-14-20 @ 13:02)  T(F): 97.3 (01-14-20 @ 15:10), Max: 97.7 (01-14-20 @ 13:02)  HR: 66 (01-14-20 @ 16:00) (54 - 108)  RR: 22 (01-14-20 @ 16:00) (8 - 84)  SpO2: 98% (01-14-20 @ 16:00) (88% - 100%)    Card S1S2  Lungs coarse BS b/l  Abd soft  Extr + edema b/l LE, AVF patent                                                                                                   8.2    12.95 )-----------( 358      ( 14 Jan 2020 14:45 )             24.2     14 Jan 2020 14:46    137    |  98     |  15     ----------------------------<  97     3.4     |  24     |  3.11     Ca    8.0        14 Jan 2020 14:46  Phos  3.0       14 Jan 2020 14:46  Mg     2.2       14 Jan 2020 14:46    TPro  5.1    /  Alb  2.6    /  TBili  0.9    /  DBili  0.5    /  AST  31     /  ALT  9      /  AlkPhos  235    14 Jan 2020 14:46    LIVER FUNCTIONS - ( 14 Jan 2020 14:46 )  Alb: 2.6 g/dL / Pro: 5.1 g/dL / ALK PHOS: 235 U/L / ALT: 9 U/L / AST: 31 U/L / GGT: x           PT/INR - ( 14 Jan 2020 14:46 )   PT: 13.0 sec;   INR: 1.13 ratio      CARDIAC MARKERS ( 13 Jan 2020 21:40 )  x     / x     / 117 U/L / x     / 6.5 ng/mL    aMIOdarone Infusion 1 mG/Min IV Continuous <Continuous>  aMIOdarone Infusion 0.5 mG/Min IV Continuous <Continuous>  brimonidine 0.2% Ophthalmic Solution 1 Drop(s) Both EYES three times a day  chlorhexidine 0.12% Liquid 15 milliLiter(s) Oral Mucosa <User Schedule>  chlorhexidine 2% Cloths 1 Application(s) Topical <User Schedule>  ciprofloxacin   IVPB 400 milliGRAM(s) IV Intermittent every 24 hours  fentaNYL   Infusion. 0.5 MICROgram(s)/kG/Hr IV Continuous <Continuous>  fluconAZOLE IVPB 400 milliGRAM(s) IV Intermittent every 24 hours  heparin  Infusion 1000 Unit(s)/Hr IV Continuous <Continuous>  HYDROmorphone  Injectable 0.5 milliGRAM(s) IV Push every 3 hours PRN  latanoprost 0.005% Ophthalmic Solution 1 Drop(s) Both EYES daily  meropenem  IVPB 500 milliGRAM(s) IV Intermittent every 24 hours  methylPREDNISolone sodium succinate Injectable 4 milliGRAM(s) IV Push daily  midazolam Injectable 2 milliGRAM(s) IV Push every 2 hours PRN  pantoprazole  Injectable 40 milliGRAM(s) IV Push daily  petrolatum Ophthalmic Ointment 1 Application(s) Both EYES daily  sodium chloride 0.9%. 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, DGF  S/p transplant kidney bx 12/13: ATN, focal TMA  S/p PLEX 12/13, 12/15  Carlos-nephric hematoma on ultrasound 12/27, increased from prior  S/p OR 1/1 for washout and repeat renal graft biopsy  Repeat renal bx c/w ATN  Infected carlos-nephric hematoma, s/p drainage w/IR  RLE DVT  Gross hematuria  S/p OR 1/10 for infected carlos-nephric hematoma, hemorrhage involving pseudo-aneurysm of anastomosis of renal artery to external iliac vein,   s/p transplant nephrectomy, s/p IVCF   S/p PEA arrest last night, intubated, s/p CTA, + PE, R retrop collection, on AC  Abx per ID, off pressors  S/p stable HD today, no fluid removed  Will eval for HD needs daily  D/w transplant team

## 2020-01-14 NOTE — CONSULT NOTE ADULT - PROBLEM SELECTOR RECOMMENDATION 9
-Obtain updated labwork/coags/ABG last set at 2130  -Full AC with goal PTT 90-99  -follow up CT-A official reading   -Care as per SICU

## 2020-01-14 NOTE — PROGRESS NOTE ADULT - SUBJECTIVE AND OBJECTIVE BOX
Follow Up:  infected hematoma    Interval History: patient with PEA arrest yesterday with episode of torsades. afebrile.     REVIEW OF SYSTEMS  [ x ] ROS unobtainable because:  intubated and sedated  [  ] All other systems negative except as noted below    Constitutional:  [ ] fever [ ] chills  [ ] weight loss  [ ] weakness  Skin:  [ ] rash [ ] phlebitis	  Eyes: [ ] icterus [ ] pain  [ ] discharge	  ENMT: [ ] sore throat  [ ] thrush [ ] ulcers [ ] exudates  Respiratory: [ ] dyspnea [ ] hemoptysis [ ] cough [ ] sputum	  Cardiovascular:  [ ] chest pain [ ] palpitations [ ] edema	  Gastrointestinal:  [ ] nausea [ ] vomiting [ ] diarrhea [ ] constipation [ ] pain	  Genitourinary:  [ ] dysuria [ ] frequency [ ] hematuria [ ] discharge [ ] flank pain  [ ] incontinence  Musculoskeletal:  [ ] myalgias [ ] arthralgias [ ] arthritis  [ ] back pain  Neurological:  [ ] headache [ ] seizures  [ ] confusion/altered mental status    Allergies  coconut (Anaphylaxis)  morphine (Pruritus; Rash)        ANTIMICROBIALS:  ciprofloxacin   IVPB 400 every 24 hours  fluconAZOLE IVPB 400 every 24 hours  meropenem  IVPB 500 every 24 hours      OTHER MEDS:  MEDICATIONS  (STANDING):  aMIOdarone Infusion 1 <Continuous>  aMIOdarone Infusion 0.5 <Continuous>  fentaNYL   Infusion. 0.5 <Continuous>  heparin  Infusion 1000 <Continuous>  HYDROmorphone  Injectable 0.5 every 3 hours PRN  methylPREDNISolone sodium succinate Injectable 4 daily  midazolam Injectable 2 every 2 hours PRN  pantoprazole  Injectable 40 daily      Vital Signs Last 24 Hrs  T(C): 36.5 (14 Jan 2020 19:00), Max: 36.5 (14 Jan 2020 13:02)  T(F): 97.7 (14 Jan 2020 19:00), Max: 97.7 (14 Jan 2020 13:02)  HR: 61 (14 Jan 2020 19:00) (61 - 96)  BP: --  BP(mean): --  RR: 20 (14 Jan 2020 19:00) (20 - 28)  SpO2: 100% (14 Jan 2020 19:00) (98% - 100%)    PHYSICAL EXAMINATION:  General: Intubated and Sedated  HEENT: +ETT  Neck: Supple  Cardiac: RRR, No M/R/G  Resp: CTAB, No Wh/Rh/Ra  Abdomen: +RLQ wound vac over transplant nephrectomy site, NBS, tenderness to palpation over  MSK: No LE edema. No Calf tenderness  : +martinez  Skin: No rashes or lesions. Skin is warm and dry to the touch.   Neuro: Intubated and Sedated  Psych: Unable to assess - intubated and sedated                          7.8    13.59 )-----------( 359      ( 14 Jan 2020 18:39 )             24.0       01-14    137  |  97  |  18  ----------------------------<  92  4.0   |  23  |  4.12<H>    Ca    7.8<L>      14 Jan 2020 18:39  Phos  4.2     01-14  Mg     2.3     01-14    TPro  4.7<L>  /  Alb  2.4<L>  /  TBili  0.8  /  DBili  0.5<H>  /  AST  27  /  ALT  8<L>  /  AlkPhos  213<H>  01-14          MICROBIOLOGY:  v  .Tissue Other  01-10-20   No growth  --  Enterobacter cloacae (Carbapenem Resistant)      .Blood Blood-Venous  01-07-20   No growth at 5 days.  --  --      .Urine Catheterized  01-07-20   <10,000 CFU/mL Normal Urogenital Gillian  --  --      Abdominal Fl Abdominal Fluid  01-06-20   No growth at 5 days  --    No polymorphonuclear leukocytes seen  No organisms seen  by cytocentrifuge      .Urine Clean Catch (Midstream)  01-05-20   >100,000 CFU/ml Enterobacter cloacae  --  Enterobacter cloacae      .Blood Blood  01-04-20   No growth at 5 days.  --  --      .Tissue Other  01-01-20   Numerous Enterobacter cloacae complex  --  Enterobacter cloacae complex      .Surgical Swab collection around right kidney  01-01-20   Moderate Enterobacter cloacae complex  --  Enterobacter cloacae complex      .Body Fluid periphrenic collection  01-01-20   **Please Note**: This is a Corrected Report**  Moderate Enterobacter cloacae complex  Moderate Escherichia coli  Previously reported as:  Moderate Enterobacter cloacae (Carbapenem Resistant)  Moderate Escherichia coli  --  Escherichia coli  Enterobacter cloacae complex      .Surgical Swab right kidney abscess  01-01-20   Moderate Enterobacter cloacae complex  --  Enterobacter cloacae complex      .Urine Clean Catch (Midstream)  12-29-19   >100,000 CFU/ml Enterobacter cloacae  --  Enterobacter cloacae      .Blood Blood-Peripheral  12-27-19   No growth at 5 days.  --  --    RADIOLOGY:    <The imaging below has been reviewed and visualized me independently>    EXAM:  CT ANGIO CHEST (W)AW IC                        EXAM:  CT ABDOMEN AND PELVIS IC                        PROCEDURE DATE:  01/13/2020    Saddle pulmonary embolus with extension into the right upper, right lower, and left lower lobar arteries with involvement of many of the distal segmental and subsegmental branches.  Small right pneumothorax. Interval increased size of a rim-enhancing retroperitoneal fluid collection containing a focus of air. This may represent a hematoma, infected hematoma, seroma, or abscess. There is thrombus at the level of the IVC filter. Some of the thrombus is tracking laterally and superiorly to the filter.

## 2020-01-14 NOTE — CHART NOTE - NSCHARTNOTEFT_GEN_A_CORE
Vascular & Interventional Radiology Brief Consult Note. Chart and imaging reviewed.    49y Male with transplant nephrectomy with post-op hematoma in the RLQ.    Refer to specimen cultures from prior aspiration and recent surgical resection. No need for new sample from IR perspective, no role for IR procedure.

## 2020-01-14 NOTE — CONSULT NOTE ADULT - ASSESSMENT
--Would obtain updated ABG and lab work     Given hemodynamic stablity, normal lactate, stable on ventilator, and therapeutic anticoagulation with heparin will continue to observe overnight    CTS [id number] 49 year old male RA, ESRD on HD, s/p DDRT with extensive complication now s/p nephrectomy of transplanted kidney who had PEA arrest with ROSC after 5 cycles of CPR and who was found to have RV strain on TTE and CT-A with preliminary read of saddle PE.   Vascular Cardiology and PERT team ([facility]) consulted and recommended CT surgery consult for open thrombectomy.   Given hemodynamic stablity, normal lactate, and respiratory stability with mechanical ventilation would continue medical management with heparin anticoagulation. We will continue to observe overnight    CTS [id number]

## 2020-01-14 NOTE — PROGRESS NOTE ADULT - SUBJECTIVE AND OBJECTIVE BOX
I evaluated this patient at approximately 6 am on 1/13/20  report of bradycardia while being transferred to bed  patient said he may have passed out and feels strange  blood pressure normalized  I continued my assessment at which point patient became unresponsive  ACLS protocol was initiated for pulselessness  Ultimate response to ACLS  then, a bedside qualitative critical care echocardiography was performed that showed a dilated right ventricle with markedly poor ventricular contraction  consistent with a right ventricular strain and overall very suspicious for a pulmonary embolism    The patient was on high dose Levophed but was able to be weaned off    the pulmonary embolism response team was consulted and joined at bedside.  They conferred likeliness of pulmonary embolism  The heparin drip was continued  discussion with transplant team in view of recent pseudoaneurysm of iliac artery requiring stent concluded that patient is not a candidate for systemic TPA at this juncture    I notified family completely informing of events    Coordinated CT imaging that conferred large pulmonary embolism.  Saddle embolism with almost complete occlusion of the right pulmonary artery    patient closely monitored and able to be stabilized with no systemic pressor and minimal vent setting by the morning  requiring sedation for agitation while on the ventilator    100 minutes of critical care management applied    Please note that I placed a left femoral triple lumen catheter under sterile conditions and using Seldinger technique under emergent conditions  Time spent on this procedure is not part of the 100 minutes of critical are time.

## 2020-01-14 NOTE — PROGRESS NOTE ADULT - PROBLEM SELECTOR PLAN 2
- Persistent but improved leukocytosis s/p transplant nephrectomy.  - On methylprenisonolone per SICU team

## 2020-01-14 NOTE — PROGRESS NOTE ADULT - ASSESSMENT
49y Male with ESRD s/p DDRT on 12/8/19 with course complicated by delayed graft function requiring HD, and infected perinephric hematoma s/p washout on 1/1/2020 with worsening sepsis and transfer to ICU, s/p IR drainage of perinephric collection on 1/6. 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 He underwent operative repair of right external iliac artery with bovine patch, transplant nephrectomy, RLE thrombectomy, and IVC filter on 1/10. Post-op course complicated by cardiac arrest secondary to saddle PE.    PLAN:    Neuro: intubated, acute post-op pain  - Monitor mental status  - Ativan infusion for sedation, will transition to Precedex  - Fentanyl infusion for analgesic sedation w/ Dilaudid IV push for breakthrough pain    Resp: acute hypoxemic respiratory failure, saddle PE  - Monitor pulse oximeter  - Mechanical ventilation for acute hypoxemic respiratory failure in the setting of cardiac arrest, will leave intubated for today    CV: obstructive shock secondary to PE, bradycardia -> PEA arrest -> torsades  - Monitor vital signs  - Wean norepinephrine infusion as tolerated with goal MAP greater than 65 mmHg  - Amiodarone infusion for ventricular tachycardia    GI: no acute issues  - NPO while intubated for now  - Protonix for stress ulcer prophylaxis    Renal: CKD stage V s/p DDRT c/b DGF & perinephric collection, s/p explantation of DDRT due to pseudoaneurysm of right external iliac and transplanted renal artery  - Monitor I&Os  - Monitor electrolytes and replete as necessary  - NS at 50 mL/hr while NPO and on vasopressor support  - Appreciate nephrology recommendations, hemodialysis as per nephrology    Heme: right external iliac & femoral DVT s/p thrombectomy & IVC filter, saddle PE  - Monitor CBC and coags  - Heparin infusion for saddle PE  - Appreciate PERT, vascular cardiology, and CT surgery recommendations    ID: infected perinephric hematoma  - Monitor WBC, temperature, and procalcitonin  - Bladder hematoma & RP hematoma on 1/10 with carbapenem resistant Enterobacter cloacae  - Empiric antibiotics with fluconazole, ciprofloxacin, and meropenem    Endo: no acute issues  - Monitor glucose on BMP    Misc:  - Brimonidine/latanoprost drops    Disposition:  - Full code  - Will remain in SICU    Jane Nicole PA-C     [phone]

## 2020-01-14 NOTE — PROGRESS NOTE ADULT - PROBLEM SELECTOR PLAN 1
s/p DDRT (12/8/19) now s/p transplant nephrectomy. Hospital course complicated yesterday by multiple cardiac events 2/2 saddle PE. Pt. now intubated and being followed closely by 8ICU team. Pt. will continue maintenance dialysis as per nephrology team s/p DDRT (12/8/19) now s/p transplant nephrectomy. Hospital course complicated yesterday by multiple cardiac events 2/2 saddle PE. Pt. now intubated and being followed closely by 8ICU team. Pt. without indication of HD treatment today. Will need to consider either HD or CRRT when dialytic therapy is indicated

## 2020-01-14 NOTE — PROGRESS NOTE ADULT - PROBLEM SELECTOR PLAN 5
Pt. with acute cardiopulmonary decompensation in the setting of saddle embolus on 1/1420. Pt. is intubated and being monitored closely by 8ICU team. Pt. to remain on anticoagulation; vascular to consider AngioJet and direct thrombolysis of saddle PE

## 2020-01-14 NOTE — PROGRESS NOTE ADULT - SUBJECTIVE AND OBJECTIVE BOX
Transplant Surgery - Multidisciplinary Rounds  --------------------------------------------------------------  R DDRT    Date:  12/8/19  Dr. Espinal, readmitted 12/27/19 with +Flu/perinephretic collection  Ex Lap washout/ renal bx  Date:  1/1/20    Interval events:    -  Patient became acutely bradycardic and hypotensive progressing into PEA arrest. ACLS performed for ~6-7 mins before ROSC was achieved. He was given a total of epinephrine x3 and sodium bicarbonate x3. He was emergently intubated and a left femoral CVC was placed. Patient became hypotensive and progressed into torsades so ACLS was restarted for ~5 mins before ROSC was achieved. He was given magnesium sulfate 2 g, amiodarone 300 mg IV x1, and defibrillated twice. Patient was started on norepinephrine and amiodarone infusions. PERT, vascular cardiology, and CT surgery consulted. POCUS demonstrated a dilated RV so CTA chest, abdomen, pelvis was obtained, which demonstrated a saddle PE. Heparin infusion was paused during the code, but was restarted with a 4000 unit bolus of heparin. TTE was performed, which demonstrated RV overload, a hyperdynamic LV with normal function, moderate pulmonary HTN, mild/moderate TR, and Crystal's sign. He was given 500 mL of 5% albumin overnight for resuscitation.    Present:   Patient seen with multidisciplinary team including Transplant Surgeon: Dr. Velásquez, Dr. Poe, Dr. Cruz, Dr. Joy, Dr. Espinal, Transplant Nephrologist: Dr. Lowery, Dr. Irvin, renal fellow, Pharmacist: Debbi Ortiz, PGY 3 Dr. Bess, NA/PA: Stefan Hunt PA student, MED student, and pharmacy studentduring am rounds and examined with Dr. Cruz.  Disciplines not in attendance will be notified of the plan.     HPI: 49M PMHx of ESRD on HD  ( via LUE AVF), HTN, Gout, Glaucoma, NET of pancreas (s/p robotic distal panc/splenectomy at [place] 2015 by Dr. Young, followed by Dr. Raphael, [facility] Oncologist), RA with hand contractures. He underwent DDRT on 12/8/19 (ureteral stent sutured to Martinez - removed 12/15). Post op course c/b DGF requiring HD, thrombocytopenia, elevated LDH and Haptoglobulin. Schistocytes  on peripheral smear concerning for TMA. Envarsus held. Received Belatacept 12/13. Started on PLEX (12/12, 12/15). Underwent renal bx on 12/13 c/w profound ATN.  Found to have much improvement to levels, likely related to Tacrolimus. He was discharged home on 12/20/19 w/ outpatient HD.     Re-admitted 12/27 with influenza, completed treatement with Tamiflu on 12/31. Urine cx on admission grew Ent cloacae, was started on meropenem 12/31. Renal US on admission with increasing hematoma. OR on 1/1 for ex-lap, hematoma evacuation, washout and renal bx. (c/w ATN)  OR cxs grew CRE and e coli. Post op course c/b:   ·	fevers and leukocytosis, CT a/p (1/4) with increased perinephric collection. Underwent IR guided collection 1/6 (fluid cx neg)  ·	New onset hematuria, martinez inserted 1/7, started CBI  ·	RLE edema, RLE doppler (1/8) with acute above the knee thrombus of R external iliac, common femoral and femoral veins, Acute calf vein thrombus affecting R soleal vein, Heparin gtt initiated   ·	AMS 1/9  (head CT neg), hyponatremic  ·	Significant hematuria and bleeding around the martinez. Angio 1/9 showed actively bleeding pseudoaneurysm at the anastomosis of the transplant renal artery with the recipient iliac artery. A stent was placed in the iliac artery to control hemorrhage. IVC filter placed.   ·	Emergent OR 1/9-1/10 for graft nephrectomy;  ureter to bladder anastomosis was completely disrupted, repaired the R external iliac artery with bovine pericardial patch, repaired right external iliac vein, performed thrombectomy of RLE veins, Intra op received 3u PRBC.       cxs:   12/29: Urine Cx: Enterobacter Cloacae  1/1 OR Perinephric collection Cx:  Carbapenem resistant Ent Cloacae, E. Coli  1/1: R kidney abscess swab: Enterobacter Cloacae  1/1  OR tissue: Enterobacter Cloacae  1/5: Urine Cx: Enteropbacter Cloacae  1/5: Skin incision Cx (bedside): NG  1/6: Adominal fluid Cx from IR:  NG   1/7: Urine Cx:  NG  1/7: BCX2:  NG  1/10 retroperit hematoma - enerobacter (pending)  1/10 bladder hematoma - enterobacter (pending)    Potential Discharge date: pending clinical improvement     MEDICATIONS  (STANDING):  aMIOdarone Infusion 1 mG/Min (33.333 mL/Hr) IV Continuous <Continuous>  aMIOdarone Infusion 0.5 mG/Min (16.667 mL/Hr) IV Continuous <Continuous>  brimonidine 0.2% Ophthalmic Solution 1 Drop(s) Both EYES three times a day  chlorhexidine 0.12% Liquid 15 milliLiter(s) Oral Mucosa <User Schedule>  chlorhexidine 2% Cloths 1 Application(s) Topical <User Schedule>  ciprofloxacin   IVPB 400 milliGRAM(s) IV Intermittent every 24 hours  fentaNYL   Infusion. 0.5 MICROgram(s)/kG/Hr (1.587 mL/Hr) IV Continuous <Continuous>  fluconAZOLE IVPB 400 milliGRAM(s) IV Intermittent every 24 hours  heparin  Infusion 1000 Unit(s)/Hr (9 mL/Hr) IV Continuous <Continuous>  latanoprost 0.005% Ophthalmic Solution 1 Drop(s) Both EYES daily  LORazepam Infusion 0.031 mG/kG/Hr (2 mL/Hr) IV Continuous <Continuous>  meropenem  IVPB 500 milliGRAM(s) IV Intermittent every 24 hours  methylPREDNISolone sodium succinate Injectable 4 milliGRAM(s) IV Push daily  pantoprazole  Injectable 40 milliGRAM(s) IV Push daily  petrolatum Ophthalmic Ointment 1 Application(s) Both EYES daily  sodium chloride 0.9%. 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  Carpal tunnel syndrome  Brain cyst: had MRI recently- now gone  Anemia  Gastric ulcer: Long time ago  Rheumatoid arthritis  Renal transplant recipient  Breakdown (mechanical) of penile (implanted) prosthesis, sequela  End-stage renal disease (ESRD): PERMACATH PLACEMENT  Abscess of left buttock: s/p I &amp; D  AV fistula: placement left lower arm  S/P cystoscopy: stent placement &amp; removal for kidney stones, lithiotripsy  s/p Lt Carpal tunnel: 2011    Vital Signs Last 24 Hrs  T(C): 36.3 (14 Jan 2020 07:00), Max: 37 (13 Jan 2020 15:00)  T(F): 97.3 (14 Jan 2020 07:00), Max: 98.6 (13 Jan 2020 15:00)  HR: 66 (14 Jan 2020 09:45) (54 - 108)  BP: --  BP(mean): --  RR: 21 (14 Jan 2020 09:45) (8 - 84)  SpO2: 100% (14 Jan 2020 09:45) (88% - 100%)    I&O's Summary    12 Jan 2020 07:01  -  13 Jan 2020 07:00  --------------------------------------------------------  IN: 1182 mL / OUT: 105 mL / NET: 1077 mL    13 Jan 2020 07:01  -  13 Jan 2020 10:22  --------------------------------------------------------  IN: 17 mL / OUT: 0 mL / NET: 17 mL                              8.8    15.26 )-----------( 390      ( 13 Jan 2020 09:08 )             25.5     01-13    136  |  96  |  24<H>  ----------------------------<  112<H>  4.8   |  22  |  5.18<H>    Ca    7.8<L>      13 Jan 2020 00:31  Phos  6.4     01-13  Mg     2.3     01-13    TPro  4.9<L>  /  Alb  2.2<L>  /  TBili  0.8  /  DBili  0.5<H>  /  AST  8<L>  /  ALT  5<L>  /  AlkPhos  92  01-13          Culture - Tissue with Gram Stain (collected 01-10-20 @ 06:07)  Source: .Tissue 1. retroperitoneal hematoma  Gram Stain (01-10-20 @ 13:29):    Few polymorphonuclear leukocytes seen per low power field    No organisms seen per oil power field  Preliminary Report (01-11-20 @ 11:15):    Rare Enterobacter cloacae complex    Culture - Tissue with Gram Stain (collected 01-10-20 @ 06:07)  Source: .Tissue 5. bladder hematoma  Gram Stain (01-10-20 @ 13:35):    No polymorphonuclear leukocytes seen per low power field    No organisms seen per oil power field  Preliminary Report (01-11-20 @ 11:20):    Rare Enterobacter cloacae complex    Culture - Tissue with Gram Stain (collected 01-10-20 @ 06:07)  Source: .Tissue Other  Gram Stain (01-10-20 @ 13:33):    Moderate polymorphonuclear leukocytes seen per low power field    No organisms seen per oil power field  Preliminary Report (01-11-20 @ 08:00):    No growth    Culture - Tissue with Gram Stain (collected 01-10-20 @ 06:07)  Source: .Tissue 3. right veinal artery  Gram Stain (01-10-20 @ 13:29):    Numerous polymorphonuclear leukocytes seen per low power field    No organisms seen per oil power field  Preliminary Report (01-11-20 @ 09:15):    No growth    Culture - Tissue with Gram Stain (collected 01-10-20 @ 06:07)  Source: .Tissue Other  Gram Stain (01-10-20 @ 15:41):    No polymorphonuclear cells seen per low power field    No organisms seen per oil power field  Preliminary Report (01-11-20 @ 08:02):    No growth    Culture - Blood (collected 01-07-20 @ 13:23)  Source: .Blood Blood-Venous  Final Report (01-12-20 @ 14:00):    No growth at 5 days.    Culture - Blood (collected 01-07-20 @ 13:23)  Source: .Blood Blood-Venous  Final Report (01-12-20 @ 14:00):    No growth at 5 days.    Culture - Urine (collected 01-07-20 @ 00:18)  Source: .Urine Catheterized  Final Report (01-07-20 @ 21:22):    <10,000 CFU/mL Normal Urogenital Gillian    Culture - Body Fluid with Gram Stain (collected 01-06-20 @ 21:03)  Source: Abdominal Fl Abdominal Fluid  Gram Stain (01-06-20 @ 23:41):    No polymorphonuclear leukocytes seen    No organisms seen    by cytocentrifuge  Final Report (01-11-20 @ 20:14):    No growth at 5 days    REVIEW OF SYSTEMS  Gen: No weight changes, fatigue, fevers/chills, weakness  Skin: No rashes  Head/Eyes/Ears/Mouth: No headache; Normal hearing; Normal vision w/o blurriness; No sinus pain/discomfort, sore throat  Respiratory: No dyspnea, cough, wheezing, hemoptysis  CV: No chest pain, PND, orthopnea  GI: + abdominal pain, No diarrhea, constipation, nausea, vomiting, melena, hematochezia  : No increased frequency, dysuria, hematuria, nocturia.  Reports resolution of bladder spasms  MSK: No joint pain/swelling; no back pain; no edema  Neuro: No dizziness/lightheadedness, weakness, seizures, numbness, tingling  Heme: No easy bruising or bleeding  Endo: No heat/cold intolerance  Psych: No significant nervousness, anxiety, stress, depression      PHYSICAL EXAM:    Constitutional: NAD  	Eyes: Anicteric, PERRLA  	ENMT: nc/at   	Neck: Supple  	Respiratory: CTA     	Cardiovascular: RRR  	Gastrointestinal: Soft abdomen, appropriate incisional TTP, ND.  wound vac ss/murky output    Genitourinary:  Martinez, anuric, residual hematuria in bag    Extremities: RLE edema   	Vascular: doppler signal R DP and PT, faintly palpable L DP.  3+ pitting edema RLE, 1+LLE . L AVF palpable  	Neurological: A&O x3. no confusion noted on exam  	Skin: no new lesions/ulcerations  	Musculoskeletal: Moving all extremities    Psychiatric: Responsive Transplant Surgery - Multidisciplinary Rounds  --------------------------------------------------------------  R DDRT    Date:  12/8/19  Dr. Espinal, readmitted 12/27/19 with +Flu/perinephretic collection  Ex Lap washout/ renal bx  Date:  1/1/20    Interval events:    - Per excellent SICU documentation, "Patient became acutely bradycardic and hypotensive progressing into PEA arrest. ACLS performed for ~6-7 mins before ROSC was achieved. He was given a total of epinephrine x3 and sodium bicarbonate x3. He was emergently intubated and a left femoral CVC was placed. Patient became hypotensive and progressed into torsades so ACLS was restarted for ~5 mins before ROSC was achieved. He was given magnesium sulfate 2 g, amiodarone 300 mg IV x1, and defibrillated twice. Patient was started on norepinephrine and amiodarone infusions. PERT, vascular cardiology, and CT surgery consulted. POCUS demonstrated a dilated RV so CTA chest, abdomen, pelvis was obtained, which demonstrated a saddle PE. Heparin infusion was paused during the code, but was restarted with a 4000 unit bolus of heparin. TTE was performed, which demonstrated RV overload, a hyperdynamic LV with normal function, moderate pulmonary HTN, mild/moderate TR, and Crystal's sign. He was given 500 mL of 5% albumin overnight for resuscitation."    Present:   Patient seen with multidisciplinary team including Transplant Surgeon: Dr. Velásquez, Dr. Poe, Dr. Cruz, Dr. Joy, Dr. Espinal, Transplant Nephrologist: Dr. Lowery, Dr. Irvin, renal fellow, Pharmacist: Debbi Ortiz, PGY 3 Dr. Bess, NA/PA: Stefan Hunt, PA student, MED student, and pharmacy studentduring am rounds and examined with Dr. Cruz.  Disciplines not in attendance will be notified of the plan.     HPI: 49M PMHx of ESRD on HD  ( via LUE AVF), HTN, Gout, Glaucoma, NET of pancreas (s/p robotic distal panc/splenectomy at [place] 2015 by Dr. Young, followed by Dr. Raphael, [facility] Oncologist), RA with hand contractures. He underwent DDRT on 12/8/19 (ureteral stent sutured to Martinez - removed 12/15). Post op course c/b DGF requiring HD, thrombocytopenia, elevated LDH and Haptoglobulin. Schistocytes  on peripheral smear concerning for TMA. Envarsus held. Received Belatacept 12/13. Started on PLEX (12/12, 12/15). Underwent renal bx on 12/13 c/w profound ATN.  Found to have much improvement to levels, likely related to Tacrolimus. He was discharged home on 12/20/19 w/ outpatient HD.     Re-admitted 12/27 with influenza, completed treatement with Tamiflu on 12/31. Urine cx on admission grew Ent cloacae, was started on meropenem 12/31. Renal US on admission with increasing hematoma. OR on 1/1 for ex-lap, hematoma evacuation, washout and renal bx. (c/w ATN)  OR cxs grew CRE and e coli. Post op course c/b:   ·	fevers and leukocytosis, CT a/p (1/4) with increased perinephric collection. Underwent IR guided collection 1/6 (fluid cx neg)  ·	New onset hematuria, martinez inserted 1/7, started CBI  ·	RLE edema, RLE doppler (1/8) with acute above the knee thrombus of R external iliac, common femoral and femoral veins, Acute calf vein thrombus affecting R soleal vein, Heparin gtt initiated   ·	AMS 1/9  (head CT neg), hyponatremic  ·	Significant hematuria and bleeding around the martinez. Angio 1/9 showed actively bleeding pseudoaneurysm at the anastomosis of the transplant renal artery with the recipient iliac artery. A stent was placed in the iliac artery to control hemorrhage. IVC filter placed.   ·	Emergent OR 1/9-1/10 for graft nephrectomy;  ureter to bladder anastomosis was completely disrupted, repaired the R external iliac artery with bovine pericardial patch, repaired right external iliac vein, performed thrombectomy of RLE veins, Intra op received 3u PRBC.       cxs:   12/29: Urine Cx: Enterobacter Cloacae  1/1 OR Perinephric collection Cx:  Carbapenem resistant Ent Cloacae, E. Coli  1/1: R kidney abscess swab: Enterobacter Cloacae  1/1  OR tissue: Enterobacter Cloacae  1/5: Urine Cx: Enteropbacter Cloacae  1/5: Skin incision Cx (bedside): NG  1/6: Adominal fluid Cx from IR:  NG   1/7: Urine Cx:  NG  1/7: BCX2:  NG  1/10 retroperit hematoma - enerobacter (pending)  1/10 bladder hematoma - enterobacter (pending)    Potential Discharge date: pending clinical improvement     MEDICATIONS  (STANDING):  aMIOdarone Infusion 1 mG/Min (33.333 mL/Hr) IV Continuous <Continuous>  aMIOdarone Infusion 0.5 mG/Min (16.667 mL/Hr) IV Continuous <Continuous>  brimonidine 0.2% Ophthalmic Solution 1 Drop(s) Both EYES three times a day  chlorhexidine 0.12% Liquid 15 milliLiter(s) Oral Mucosa <User Schedule>  chlorhexidine 2% Cloths 1 Application(s) Topical <User Schedule>  ciprofloxacin   IVPB 400 milliGRAM(s) IV Intermittent every 24 hours  fentaNYL   Infusion. 0.5 MICROgram(s)/kG/Hr (1.587 mL/Hr) IV Continuous <Continuous>  fluconAZOLE IVPB 400 milliGRAM(s) IV Intermittent every 24 hours  heparin  Infusion 1000 Unit(s)/Hr (9 mL/Hr) IV Continuous <Continuous>  latanoprost 0.005% Ophthalmic Solution 1 Drop(s) Both EYES daily  LORazepam Infusion 0.031 mG/kG/Hr (2 mL/Hr) IV Continuous <Continuous>  meropenem  IVPB 500 milliGRAM(s) IV Intermittent every 24 hours  methylPREDNISolone sodium succinate Injectable 4 milliGRAM(s) IV Push daily  pantoprazole  Injectable 40 milliGRAM(s) IV Push daily  petrolatum Ophthalmic Ointment 1 Application(s) Both EYES daily  sodium chloride 0.9%. 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  Carpal tunnel syndrome  Brain cyst: had MRI recently- now gone  Anemia  Gastric ulcer: Long time ago  Rheumatoid arthritis  Renal transplant recipient  Breakdown (mechanical) of penile (implanted) prosthesis, sequela  End-stage renal disease (ESRD): PERMACATH PLACEMENT  Abscess of left buttock: s/p I &amp; D  AV fistula: placement left lower arm  S/P cystoscopy: stent placement &amp; removal for kidney stones, lithiotripsy  s/p Lt Carpal tunnel: 2011    Vital Signs Last 24 Hrs  T(C): 36.3 (14 Jan 2020 07:00), Max: 37 (13 Jan 2020 15:00)  T(F): 97.3 (14 Jan 2020 07:00), Max: 98.6 (13 Jan 2020 15:00)  HR: 66 (14 Jan 2020 09:45) (54 - 108)  BP: --  BP(mean): --  RR: 21 (14 Jan 2020 09:45) (8 - 84)  SpO2: 100% (14 Jan 2020 09:45) (88% - 100%)    I&O's Summary    13 Jan 2020 07:01  -  14 Jan 2020 07:00  --------------------------------------------------------  IN: 1985.6 mL / OUT: 350 mL / NET: 1635.6 mL    14 Jan 2020 07:01  -  14 Jan 2020 10:47  --------------------------------------------------------  IN: 158.6 mL / OUT: 0 mL / NET: 158.6 mL    CBC (01-14 @ 10:30)                          8.3<L>                   14.09<H>  )--------------(  365        --    % Neuts, --    % Lymphs, ANC: --                              24.5<L>  CBC (01-14 @ 05:24)                          8.2<L>                   15.59<H>  )--------------(  348        --    % Neuts, --    % Lymphs, ANC: --                              24.4<L>    BMP (01-14 @ 05:24)       136     |  98      |  34<H> 			Ca++ --      Ca 8.7          ---------------------------------( 91    		Mg 2.8<H>       4.5     |  25      |  5.95<H>			Ph 6.3<H>  BMP (01-14 @ 01:12)       132<L>  |  94<L>   |  31<H> 			Ca++ --      Ca 8.1<L>       ---------------------------------( 143<H>		Mg 2.9<H>       4.4     |  22      |  5.82<H>			Ph 6.7<H>    LFTs (01-14 @ 05:24)      TPro 4.9<L> / Alb 2.6<L> / TBili 1.3<H> / DBili 0.8<H> / AST 49<H> / ALT 10 / AlkPhos 237<H>  LFTs (01-14 @ 01:12)      TPro 5.1<L> / Alb 2.6<L> / TBili 1.6<H> / DBili 1.0<H> / AST 56<H> / ALT 10 / AlkPhos 271<H>    Coags (01-14 @ 05:24)  aPTT 93.8<H> / INR 1.17<H> / PT 13.4<H>  Coags (01-14 @ 01:12)  aPTT 105.0<H> / INR 1.14 / PT 13.0<H>    Cardiac Markers (01-13 @ 21:40)     Trop: -- -- / CKMB: -- / CK: 117    ABG (01-14 @ 05:50)     7.44 / 39 / 141<H> / 26 / 2.2<H> / 99<H>%     Lactate:    ABG (01-14 @ 01:00)     7.41 / 42 / 212<H> / 26 / 1.6 / 100<H>%     Lactate:          -> .Tissue Other Culture (01-10 @ 06:07)       No polymorphonuclear cells seen per low power field  No organisms seen per oil power field    Enterobacter cloacae (Carbapenem Resistant)    No growth    -> .Blood Blood-Venous Culture (01-07 @ 13:23)     NG    NG    No growth at 5 days.    -> .Urine Catheterized Culture (01-07 @ 00:18)     NG    NG    <10,000 CFU/mL Normal Urogenital Gillian    -> Abdominal Fl Abdominal Fluid Culture (01-06 @ 21:03)       No polymorphonuclear leukocytes seen  No organisms seen  by cytocentrifuge    NG    No growth at 5 days    -> .Urine Clean Catch (Midstream) Culture (01-05 @ 13:59)     NG    Enterobacter cloacae    >100,000 CFU/ml Enterobacter cloacae    -> .Blood Blood Culture (01-04 @ 13:07)     NG    NG    No growth at 5 days.    REVIEW OF SYSTEMS  RoS limited by patient's status of being sedated and on ventilator     PHYSICAL EXAM:    Constitutional: sedated  	Eyes: Anicteric, PERRLA  	Respiratory: on mechanical ventilator: 450, 20, 5 and 30% with good gas exchange   	Cardiovascular: RRR  	Gastrointestinal: Soft abdomen, appropriate incisional TTP, ND.  wound vac ss/murky output    Genitourinary:  Martinez, anuric, residual hematuria in bag    Extremities: RLE edema   	Vascular: doppler signal R DP and PT, faintly palpable L DP.  3+ pitting edema RLE, 1+LLE . L AVF palpable  	Neurological: A&O x3. no confusion noted on exam  	Skin: no new lesions/ulcerations  	Musculoskeletal: Moving all extremities    Psychiatric: sedated, RASS - 1

## 2020-01-14 NOTE — PROGRESS NOTE ADULT - SUBJECTIVE AND OBJECTIVE BOX
HISTORY  49y Male ESRD on HD  (via LUE AVF), HTN, Gout, Glaucoma, PNET s/p robotic distal panc/splenectomy (at [place] 2015 by Dr. Young, followed by Dr. Raphael, [facility] Oncologist), RA with hand contractures. 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 Post op course c/b DGF requiring HD, thrombocytopenia, elevated LDH and Haptoglobulin. Schistocytes  on peripheral smear concerning for TMA. Envarsus held. Received Belatacept 12/13. Started on PLEX (12/12, 12/15). Underwent renal bx on 12/13 c/w profound ATN.  Found to have much improvement to levels, likely related to Tacrolimus. He was discharged home on 12/20/19 w/ outpatient HD.     He was now sent to ED by dialysis center after he was found to be febrile to 102, he did not get HD this AM, and last full HD session was on 12/26. Upon arrival, he was febrile to 101.7, other vitals were stable. He states that he had a cough that began 5-6 days ago that has now mostly resolved. He reports no other febrile episodes other than this morning. He admits to having one SOB episode earlier this week that resolved after he got HD. He also states that his mother was hospitalized earlier this week and was flu+. Currently makes less than 1 cup urine/daily. He currently denies SOB, CP, dyspnea, HA, dizziness, N/V, diarrhea, constipation.     1/1/2020 was taken to OR and found to have a perinephric hematoma. Underwent washout, evacuation of hematoma, and biopsy of transplanted kidney. He was recovering on the floor when overnight he spiked a fever to 38.1 and had 18 beats of wide complex tachycardia. He was also found to have worsening leukocytosis and became acutely hypotensive. A CT scan was obtained demonstrating a 4.5x3.7 perinephric collection. IR placed a drain on 1/6. Post-procedure, patient began having gross hematuria. CBI was started with improvement in hematuria. Repeat renal Duplex on 1/7 demonstrated no change in the perinephric collection. RLE was noted to be more swollen so a LE Duplex was obtained on 1/8, which demonstrated a right external iliac and common femoral DVT so a heparin infusion was started. However, patient had worsening hematuria w/ downtrending HCT. 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 A stent was placed to temporize the patient and he was taken to the OR early on 1/10 for an emergent right groin exploration, RP washout of hematoma, transplant nephrectomy, right external iliac artery repair w/ bovine patch, thrombectomy of the RLE veins, removal of the IR stent, and external iliac vein ligation. Case was uneventful and patient returned to SICU intubated and on vasopressor support. He was extubated and weaned off vasopressor support shortly after return to SICU. HISTORY  49y Male ESRD on HD  (via LUE AVF), HTN, Gout, Glaucoma, PNET s/p robotic distal panc/splenectomy (at [place] 2015 by Dr. Young, followed by Dr. Raphael, [facility] Oncologist), RA with hand contractures. 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 Post op course c/b DGF requiring HD, thrombocytopenia, elevated LDH and Haptoglobulin. Schistocytes  on peripheral smear concerning for TMA. Envarsus held. Received Belatacept 12/13. Started on PLEX (12/12, 12/15). Underwent renal bx on 12/13 c/w profound ATN.  Found to have much improvement to levels, likely related to Tacrolimus. He was discharged home on 12/20/19 w/ outpatient HD.     He was now sent to ED by dialysis center after he was found to be febrile to 102, he did not get HD this AM, and last full HD session was on 12/26. Upon arrival, he was febrile to 101.7, other vitals were stable. He states that he had a cough that began 5-6 days ago that has now mostly resolved. He reports no other febrile episodes other than this morning. He admits to having one SOB episode earlier this week that resolved after he got HD. He also states that his mother was hospitalized earlier this week and was flu+. Currently makes less than 1 cup urine/daily. He currently denies SOB, CP, dyspnea, HA, dizziness, N/V, diarrhea, constipation.     1/1/2020 was taken to OR and found to have a perinephric hematoma. Underwent washout, evacuation of hematoma, and biopsy of transplanted kidney. He was recovering on the floor when overnight he spiked a fever to 38.1 and had 18 beats of wide complex tachycardia. He was also found to have worsening leukocytosis and became acutely hypotensive. A CT scan was obtained demonstrating a 4.5x3.7 perinephric collection. IR placed a drain on 1/6. Post-procedure, patient began having gross hematuria. CBI was started with improvement in hematuria. Repeat renal Duplex on 1/7 demonstrated no change in the perinephric collection. RLE was noted to be more swollen so a LE Duplex was obtained on 1/8, which demonstrated a right external iliac and common femoral DVT so a heparin infusion was started. However, patient had worsening hematuria w/ downtrending HCT. 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 A stent was placed to temporize the patient and he was taken to the OR early on 1/10 for an emergent right groin exploration, RP washout of hematoma, transplant nephrectomy, right external iliac artery repair w/ bovine patch, thrombectomy of the RLE veins, removal of the IR stent, and external iliac vein ligation. Case was uneventful and patient returned to SICU intubated and on vasopressor support. He was extubated and weaned off vasopressor support shortly after return to SICU.    24 HOUR EVENTS: Patient became acutely bradycardic and hypotensive progressing into PEA arrest. ACLS performed for ~6-7 mins before ROSC was achieved. He was given a total of epinephrine x3 and sodium bicarbonate x3. He was emergently intubated and a left femoral CVC was placed. Patient became hypotensive and progressed into torsades so ACLS was restarted for ~5 mins before ROSC was achieved. He was given magnesium sulfate 2 g, amiodarone 300 mg IV x1, and defibrillated twice. Patient was started on norepinephrine and amiodarone infusions. PERT, vascular cardiology, and CT surgery consulted. POCUS demonstrated a dilated RV so CTA chest, abdomen, pelvis was obtained, which demonstrated a saddle PE. Heparin infusion was paused during the code, but was restarted with a 4000 unit bolus of heparin. TTE was performed, which demonstrated RV overload, a hyperdynamic LV with normal function, moderate pulmonary HTN, mild/moderate TR, and Crystal's sign. He was given 500 mL of 5% albumin overnight for resuscitation.    SUBJECTIVE/ROS:  [x] A ten-point review of systems was otherwise negative except as noted.  [ ] Due to altered mental status/intubation, subjective information were not able to be obtained from the patient. History was obtained, to the extent possible, from review of the chart and collateral sources of information.    NEURO  Exam: sedated, easily arousable, intermittently agitated, follows commands, no acute distress, moving all four extremities spontaneously  Meds:  - fentaNYL   Infusion. 0.5 MICROgram(s)/kG/Hr IV Continuous <Continuous>  - HYDROmorphone  Injectable 0.5 milliGRAM(s) IV Push every 3 hours PRN Breakthrough Pain  - LORazepam Infusion 0.031 mG/kG/Hr IV Continuous <Continuous>  [x] Adequacy of sedation and pain control has been assessed and adjusted    RESPIRATORY  RR: 20 (01-14-20 @ 07:00) (8 - 84)  SpO2: 100% (01-14-20 @ 07:00) (88% - 100%)  Exam: clear to auscultation bilaterally  Mechanical Ventilation: Mode: AC/ CMV (Assist Control/ Continuous Mandatory Ventilation), RR (machine): 20, RR (patient): 20, TV (machine): 450, FiO2: 30, PEEP: 5, ITime: 1, MAP: 9, PIP: 19  [x] Extubation Readiness Assessed  ABG - ( 14 Jan 2020 05:50 )  pH: 7.44  /  pCO2: 39    /  pO2: 141   / HCO3: 26    / Base Excess: 2.2   /  SaO2: 99    /    Lactate: 1.2  Meds: none    CARDIOVASCULAR  HR: 68 (01-14-20 @ 07:00) (54 - 108)  ABP: 100/60 (01-14-20 @ 07:00) (66/48 - 166/107)  ABP(mean): 75 (01-14-20 @ 07:00) (40 - 130)  Exam: regular rate and rhythm, S1S2  Cardiac Rhythm: sinus  Perfusion    [x]Adequate    [ ]Inadequate  Mentation   [ ]Normal       [x]Reduced  Extremities  [x]Warm         [ ]Cool  Volume Status [ ]Hypervolemic [x]Euvolemic [ ]Hypovolemic  Meds:  - aMIOdarone Infusion 0.5 mG/Min IV Continuous <Continuous>  - norepinephrine Infusion 0.02 MICROgram(s)/kG/Min IV Continuous <Continuous>    GI/NUTRITION  Exam: soft, nondistended, carlos-incisional tenderness in right groin, RLQ wound VAC with minimal serosanguineous output, Kennedy with no output  Diet: NPO  Meds: pantoprazole  Injectable 40 milliGRAM(s) IV Push daily    GENITOURINARY  I&O's Detail    01-13 @ 07:01  -  01-14 @ 07:00  --------------------------------------------------------  IN:    amiodarone Infusion: 199.8 mL    amiodarone Infusion: 133.6 mL    fentaNYL Infusion.: 20.8 mL    heparin Infusion: 76.5 mL    heparin Infusion: 55.5 mL    heparin Infusion: 74 mL    LORazepam  Infusion: 4 mL    LORazepam  Infusion: 12 mL    norepinephrine Infusion: 59.4 mL    sodium chloride 0.9%.: 400 mL    Solution: 200 mL    Solution: 750 mL  Total IN: 1985.6 mL    OUT:    VAC (Vacuum Assisted Closure) System: 350 mL  Total OUT: 350 mL    Total NET: 1635.6 mL    136  |  98  |  34<H>  ----------------------------<  91  4.5   |  25  |  5.95<H>    Ca    8.7      14 Jan 2020 05:24  Phos  6.3  Mg     2.8  TPro  4.9<L>  /  Alb  2.6<L>  /  TBili  1.3<H>  /  DBili  0.8<H>  /  AST  49<H>  /  ALT  10  /  AlkPhos  237<H>    [ ] Kennedy catheter, indication: urinary obstruction  Meds: sodium chloride 0.9% infuse at 50 mL/hr    HEMATOLOGIC  Meds: heparin  Infusion 1000 Unit(s)/Hr IV Continuous <Continuous>  [x] VTE Prophylaxis                        8.2    15.59 )-----------( 348      ( 14 Jan 2020 05:24 )             24.4     PT/INR - ( 14 Jan 2020 05:24 )   PT: 13.4 sec;   INR: 1.17 ratio    PTT - ( 14 Jan 2020 05:24 )  PTT:93.8 sec    INFECTIOUS DISEASES  T(C): 36.4 (01-14-20 @ 03:00), Max: 37 (01-13-20 @ 15:00)  WBC Count:  - 15.59 K/uL (01-14 @ 05:24)  - 21.17 K/uL (01-14 @ 01:12)  - 21.04 K/uL (01-13 @ 21:40)  - 13.99 K/uL (01-13 @ 18:20)  - 12.57 K/uL (01-13 @ 15:33)  - 15.26 K/uL (01-13 @ 09:08)  Recent Cultures:  - Specimen Source: .Tissue Other, 01-10 @ 06:07  Results: No growth  Gram Stain: No polymorphonuclear cells seen per low power field; No organisms seen per oil power field  Organism: Enterobacter cloacae (Carbapenem Resistant)  - Specimen Source: .Blood Blood-Venous, 01-07 @ 13:23  Results: No growth at 5 days.  Gram Stain: --  Organism: --  Meds:  - ciprofloxacin   IVPB 400 milliGRAM(s) IV Intermittent every 24 hours  - fluconAZOLE IVPB 400 milliGRAM(s) IV Intermittent every 24 hours  - meropenem  IVPB 500 milliGRAM(s) IV Intermittent every 24 hours    ENDOCRINE  Capillary Blood Glucose: none  Meds: methylPREDNISolone sodium succinate Injectable 4 milliGRAM(s) IV Push daily    ACCESS DEVICES:  [x] Peripheral IV  [x] Central Venous Line	[ ] R	[x] L	[ ] IJ	[x] Fem	[ ] SC	Placed: 1/13  [x] Arterial Line		[x] R	[ ] L	[ ] Fem	[x] Rad	[ ] Ax	Placed: 1/10  [ ] PICC:					[ ] Mediport  [x] Urinary Catheter, Date Placed: 1/6  [x] Necessity of urinary, arterial, and venous catheters discussed    OTHER MEDICATIONS:  - brimonidine 0.2% Ophthalmic Solution 1 Drop(s) Both EYES three times a day  - chlorhexidine 0.12% Liquid 15 milliLiter(s) Oral Mucosa <User Schedule>  - chlorhexidine 2% Cloths 1 Application(s) Topical <User Schedule>  - latanoprost 0.005% Ophthalmic Solution 1 Drop(s) Both EYES daily  - petrolatum Ophthalmic Ointment 1 Application(s) Both EYES daily    CODE STATUS: Full code    IMAGING:

## 2020-01-14 NOTE — PROGRESS NOTE ADULT - SUBJECTIVE AND OBJECTIVE BOX
Vascular Cardiology Progress Note    INTERVAL HISTORY: Last night cardiac arrest w/ work up and diagnosis of extensive b/l PE. Started on Levo which has been weaned off. On heparin gtt w/ stable blood counts. Patient remains intubated and sedated.     MEDICATIONS:  aMIOdarone Infusion 1 mG/Min IV Continuous <Continuous>  aMIOdarone Infusion 0.5 mG/Min IV Continuous <Continuous>  heparin  Infusion 1000 Unit(s)/Hr IV Continuous <Continuous>  norepinephrine Infusion 0.02 MICROgram(s)/kG/Min IV Continuous <Continuous>  ciprofloxacin   IVPB 400 milliGRAM(s) IV Intermittent every 24 hours  fluconAZOLE IVPB 400 milliGRAM(s) IV Intermittent every 24 hours  meropenem  IVPB 500 milliGRAM(s) IV Intermittent every 24 hours  fentaNYL   Infusion. 0.5 MICROgram(s)/kG/Hr IV Continuous <Continuous>  HYDROmorphone  Injectable 0.5 milliGRAM(s) IV Push every 3 hours PRN  LORazepam Infusion 0.031 mG/kG/Hr IV Continuous <Continuous>  pantoprazole  Injectable 40 milliGRAM(s) IV Push daily  methylPREDNISolone sodium succinate Injectable 4 milliGRAM(s) IV Push daily  brimonidine 0.2% Ophthalmic Solution 1 Drop(s) Both EYES three times a day  chlorhexidine 0.12% Liquid 15 milliLiter(s) Oral Mucosa <User Schedule>  chlorhexidine 2% Cloths 1 Application(s) Topical <User Schedule>  latanoprost 0.005% Ophthalmic Solution 1 Drop(s) Both EYES daily  petrolatum Ophthalmic Ointment 1 Application(s) Both EYES daily  sodium chloride 0.9%. 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  Carpal tunnel syndrome  Brain cyst: had MRI recently- now gone  Anemia  Gastric ulcer: Long time ago  Rheumatoid arthritis  Renal transplant recipient  Breakdown (mechanical) of penile (implanted) prosthesis, sequela  End-stage renal disease (ESRD): PERMACATH PLACEMENT  Abscess of left buttock: s/p I &amp; D  AV fistula: placement left lower arm  S/P cystoscopy: stent placement &amp; removal for kidney stones, lithiotripsy  s/p Lt Carpal tunnel: 2011      FAMILY HISTORY:  Family history of myocardial infarction in first degree male relative  Family history of rheumatoid arthritis  Family history of hypertension in mother  Family history of cerebral aneurysm (Sibling)      SOCIAL HISTORY:  unchanged    REVIEW OF SYSTEMS:  [x] Unable to obtain    PHYSICAL EXAM:  T(C): 36.4 (01-14-20 @ 03:00), Max: 37 (01-13-20 @ 15:00)  HR: 68 (01-14-20 @ 07:00) (54 - 108)  BP: --  RR: 20 (01-14-20 @ 07:00) (8 - 84)  SpO2: 100% (01-14-20 @ 07:00) (88% - 100%)  Wt(kg): --  I&O's Summary    13 Jan 2020 07:01  -  14 Jan 2020 07:00  --------------------------------------------------------  IN: 1985.6 mL / OUT: 350 mL / NET: 1635.6 mL        Appearance: Sedated  HEENT: Intubated  Carotid: [ x ] No bruits bilaterally    Lymphatic: No lymphadenopathy  Cardiovascular: [ x ] Normal S1 S2  [  ] No JVD  [  ] No murmurs +2/3 RLE edema   Respiratory: Lungs clear to auscultation	  Psychiatry: unable to assess  Gastrointestinal:  Soft, Non-tender, + BS	  Skin: [ x ] intact   Neurologic: Non-focal  Vascular:   Right Femoral:  Palpable   Left Femoral:  Palpable  Right Popliteal: Palpable   Left Popliteal:  palpable  Right DP:  Palpable             Left DP:  + doppler  Right PT:  Palpable              Left PT:  + doppler      LABS:	 	    CBC Full  -  ( 14 Jan 2020 05:24 )  WBC Count : 15.59 K/uL  Hemoglobin : 8.2 g/dL  Hematocrit : 24.4 %  Platelet Count - Automated : 348 K/uL  Mean Cell Volume : 90.0 fl  Mean Cell Hemoglobin : 30.3 pg  Mean Cell Hemoglobin Concentration : 33.6 gm/dL  Auto Neutrophil # : x  Auto Lymphocyte # : x  Auto Monocyte # : x  Auto Eosinophil # : x  Auto Basophil # : x  Auto Neutrophil % : x  Auto Lymphocyte % : x  Auto Monocyte % : x  Auto Eosinophil % : x  Auto Basophil % : x    01-14    136  |  98  |  34<H>  ----------------------------<  91  4.5   |  25  |  5.95<H>  01-14    132<L>  |  94<L>  |  31<H>  ----------------------------<  143<H>  4.4   |  22  |  5.82<H>    Ca    8.7      14 Jan 2020 05:24  Ca    8.1<L>      14 Jan 2020 01:12  Phos  6.3     01-14  Phos  6.7     01-14  Mg     2.8     01-14  Mg     2.9     01-14    TPro  4.9<L>  /  Alb  2.6<L>  /  TBili  1.3<H>  /  DBili  0.8<H>  /  AST  49<H>  /  ALT  10  /  AlkPhos  237<H>  01-14  TPro  5.1<L>  /  Alb  2.6<L>  /  TBili  1.6<H>  /  DBili  1.0<H>  /  AST  56<H>  /  ALT  10  /  AlkPhos  271<H>  01-14    INR: 1.17 ratio <H> (01-14-20 @ 05:24)  INR: 1.14 ratio (01-14-20 @ 01:12)  INR: 1.12 ratio (01-13-20 @ 21:40)  INR: 1.03 ratio (01-13-20 @ 18:20)          Assessment:  49M with ESRD s/p DDRT on 12/8/19 with course complicated by delayed graft function requiring HD, and infected perinephric hematoma s/p washout on 1/1/2020 with worsening sepsis and transfer to ICU, s/p IR drainage of perinephric collection on 1/6. 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 He underwent operative repair of right external iliac artery with bovine patch, transplant nephrectomy, RLE thrombectomy, and IVC filter on 1/10. Post-op course complicated by cardiac arrest secondary to saddle PE.  1. PE - massive bilaterally PE in the setting of recent acute above-the-knee thrombus affecting the right external iliac, common femoral, femoral veins and right soleal vein s/p thrombectomy and filter placement. Overnight patient required pressor initiation which has been weaned off as of this morning. Tolerating AC. Oxygenating well.   2. Pseudoaneurysm of right external iliac artery / transplant renal artery anastomosis - s/p operative repair of right external iliac artery with pericardial patch and transplant nephrectomy  3. Perinephric hematoma complicated by infection     Plan:  1. Continue heparin gtt for now. No thrombolytic therapy indicated at this time.  2. Eventual oral AC as patient displays stability.      Thank you,    Oj Moreau MD  Cardiology Fellow  Vascular Cardiology Service  Cell: [phone] Vascular Cardiology Progress Note    INTERVAL HISTORY: Last night cardiac arrest w/ work up and diagnosis of extensive b/l PE. Started on Levo which has been weaned off. On heparin gtt w/ stable blood counts. Patient remains intubated and sedated.     MEDICATIONS:  aMIOdarone Infusion 1 mG/Min IV Continuous <Continuous>  aMIOdarone Infusion 0.5 mG/Min IV Continuous <Continuous>  heparin  Infusion 1000 Unit(s)/Hr IV Continuous <Continuous>  norepinephrine Infusion 0.02 MICROgram(s)/kG/Min IV Continuous <Continuous>  ciprofloxacin   IVPB 400 milliGRAM(s) IV Intermittent every 24 hours  fluconAZOLE IVPB 400 milliGRAM(s) IV Intermittent every 24 hours  meropenem  IVPB 500 milliGRAM(s) IV Intermittent every 24 hours  fentaNYL   Infusion. 0.5 MICROgram(s)/kG/Hr IV Continuous <Continuous>  HYDROmorphone  Injectable 0.5 milliGRAM(s) IV Push every 3 hours PRN  LORazepam Infusion 0.031 mG/kG/Hr IV Continuous <Continuous>  pantoprazole  Injectable 40 milliGRAM(s) IV Push daily  methylPREDNISolone sodium succinate Injectable 4 milliGRAM(s) IV Push daily  brimonidine 0.2% Ophthalmic Solution 1 Drop(s) Both EYES three times a day  chlorhexidine 0.12% Liquid 15 milliLiter(s) Oral Mucosa <User Schedule>  chlorhexidine 2% Cloths 1 Application(s) Topical <User Schedule>  latanoprost 0.005% Ophthalmic Solution 1 Drop(s) Both EYES daily  petrolatum Ophthalmic Ointment 1 Application(s) Both EYES daily  sodium chloride 0.9%. 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  Carpal tunnel syndrome  Brain cyst: had MRI recently- now gone  Anemia  Gastric ulcer: Long time ago  Rheumatoid arthritis  Renal transplant recipient  Breakdown (mechanical) of penile (implanted) prosthesis, sequela  End-stage renal disease (ESRD): PERMACATH PLACEMENT  Abscess of left buttock: s/p I &amp; D  AV fistula: placement left lower arm  S/P cystoscopy: stent placement &amp; removal for kidney stones, lithiotripsy  s/p Lt Carpal tunnel: 2011      FAMILY HISTORY:  Family history of myocardial infarction in first degree male relative  Family history of rheumatoid arthritis  Family history of hypertension in mother  Family history of cerebral aneurysm (Sibling)      SOCIAL HISTORY:  unchanged    REVIEW OF SYSTEMS:  [x] Unable to obtain    PHYSICAL EXAM:  T(C): 36.4 (01-14-20 @ 03:00), Max: 37 (01-13-20 @ 15:00)  HR: 68 (01-14-20 @ 07:00) (54 - 108)  BP: --  RR: 20 (01-14-20 @ 07:00) (8 - 84)  SpO2: 100% (01-14-20 @ 07:00) (88% - 100%)  Wt(kg): --  I&O's Summary    13 Jan 2020 07:01  -  14 Jan 2020 07:00  --------------------------------------------------------  IN: 1985.6 mL / OUT: 350 mL / NET: 1635.6 mL        Appearance: Sedated  HEENT: Intubated  Carotid: [ x ] No bruits bilaterally    Lymphatic: No lymphadenopathy  Cardiovascular: [ x ] Normal S1 S2  [  ] No JVD  [  ] No murmurs +2/3 RLE edema   Respiratory: Lungs clear to auscultation	  Psychiatry: unable to assess  Gastrointestinal:  Soft, Non-tender, + BS	  Skin: [ x ] intact   Neurologic: Non-focal  Vascular:   Right Femoral:  Palpable   Left Femoral:  Palpable  Right Popliteal: Palpable   Left Popliteal:  palpable  Right DP:  Palpable             Left DP:  + doppler  Right PT:  Palpable              Left PT:  + doppler      LABS:	 	    CBC Full  -  ( 14 Jan 2020 05:24 )  WBC Count : 15.59 K/uL  Hemoglobin : 8.2 g/dL  Hematocrit : 24.4 %  Platelet Count - Automated : 348 K/uL  Mean Cell Volume : 90.0 fl  Mean Cell Hemoglobin : 30.3 pg  Mean Cell Hemoglobin Concentration : 33.6 gm/dL  Auto Neutrophil # : x  Auto Lymphocyte # : x  Auto Monocyte # : x  Auto Eosinophil # : x  Auto Basophil # : x  Auto Neutrophil % : x  Auto Lymphocyte % : x  Auto Monocyte % : x  Auto Eosinophil % : x  Auto Basophil % : x    01-14    136  |  98  |  34<H>  ----------------------------<  91  4.5   |  25  |  5.95<H>  01-14    132<L>  |  94<L>  |  31<H>  ----------------------------<  143<H>  4.4   |  22  |  5.82<H>    Ca    8.7      14 Jan 2020 05:24  Ca    8.1<L>      14 Jan 2020 01:12  Phos  6.3     01-14  Phos  6.7     01-14  Mg     2.8     01-14  Mg     2.9     01-14    TPro  4.9<L>  /  Alb  2.6<L>  /  TBili  1.3<H>  /  DBili  0.8<H>  /  AST  49<H>  /  ALT  10  /  AlkPhos  237<H>  01-14  TPro  5.1<L>  /  Alb  2.6<L>  /  TBili  1.6<H>  /  DBili  1.0<H>  /  AST  56<H>  /  ALT  10  /  AlkPhos  271<H>  01-14    INR: 1.17 ratio <H> (01-14-20 @ 05:24)  INR: 1.14 ratio (01-14-20 @ 01:12)  INR: 1.12 ratio (01-13-20 @ 21:40)  INR: 1.03 ratio (01-13-20 @ 18:20)          Assessment:  49M with ESRD s/p DDRT on 12/8/19 with course complicated by delayed graft function requiring HD, and infected perinephric hematoma s/p washout on 1/1/2020 with worsening sepsis and transfer to ICU, s/p IR drainage of perinephric collection on 1/6. 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 He underwent operative repair of right external iliac artery with bovine patch, transplant nephrectomy, RLE thrombectomy, and IVC filter on 1/10. Post-op course complicated by cardiac arrest secondary to saddle PE.  1. PE - massive bilaterally PE in the setting of recent acute above-the-knee thrombus affecting the right external iliac, common femoral, femoral veins and right soleal vein s/p thrombectomy and filter placement. Overnight, CT's reviewed - appears patient has extensive thrombus above and below the IVC filter. Hemodynamically patient required pressor initiation which has been weaned off as of this morning. Tolerating AC. Oxygenating well.   2. Pseudoaneurysm of right external iliac artery / transplant renal artery anastomosis - s/p operative repair of right external iliac artery with pericardial patch and transplant nephrectomy  3. Cardiac arrest   3. Perinephric hematoma complicated by infection     Plan:  1. Continue heparin gtt for now. No thrombolytic/thrombectomy therapy indicated at this time.  2. Eventual oral AC as patient displays stability.   3. Arrythmia management per general cardiolgy     Thank you,    Oj Moreau MD  Cardiology Fellow  Vascular Cardiology Service  Cell: [phone] Vascular Cardiology Progress Note    INTERVAL HISTORY: Last night cardiac arrest w/ work up and diagnosis of extensive b/l PE. Started on Levo which has been weaned off. On heparin gtt w/ stable blood counts. Patient remains intubated and sedated.     MEDICATIONS:  aMIOdarone Infusion 1 mG/Min IV Continuous <Continuous>  aMIOdarone Infusion 0.5 mG/Min IV Continuous <Continuous>  heparin  Infusion 1000 Unit(s)/Hr IV Continuous <Continuous>  norepinephrine Infusion 0.02 MICROgram(s)/kG/Min IV Continuous <Continuous>  ciprofloxacin   IVPB 400 milliGRAM(s) IV Intermittent every 24 hours  fluconAZOLE IVPB 400 milliGRAM(s) IV Intermittent every 24 hours  meropenem  IVPB 500 milliGRAM(s) IV Intermittent every 24 hours  fentaNYL   Infusion. 0.5 MICROgram(s)/kG/Hr IV Continuous <Continuous>  HYDROmorphone  Injectable 0.5 milliGRAM(s) IV Push every 3 hours PRN  LORazepam Infusion 0.031 mG/kG/Hr IV Continuous <Continuous>  pantoprazole  Injectable 40 milliGRAM(s) IV Push daily  methylPREDNISolone sodium succinate Injectable 4 milliGRAM(s) IV Push daily  brimonidine 0.2% Ophthalmic Solution 1 Drop(s) Both EYES three times a day  chlorhexidine 0.12% Liquid 15 milliLiter(s) Oral Mucosa <User Schedule>  chlorhexidine 2% Cloths 1 Application(s) Topical <User Schedule>  latanoprost 0.005% Ophthalmic Solution 1 Drop(s) Both EYES daily  petrolatum Ophthalmic Ointment 1 Application(s) Both EYES daily  sodium chloride 0.9%. 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  Carpal tunnel syndrome  Brain cyst: had MRI recently- now gone  Anemia  Gastric ulcer: Long time ago  Rheumatoid arthritis  Renal transplant recipient  Breakdown (mechanical) of penile (implanted) prosthesis, sequela  End-stage renal disease (ESRD): PERMACATH PLACEMENT  Abscess of left buttock: s/p I &amp; D  AV fistula: placement left lower arm  S/P cystoscopy: stent placement &amp; removal for kidney stones, lithiotripsy  s/p Lt Carpal tunnel: 2011      FAMILY HISTORY:  Family history of myocardial infarction in first degree male relative  Family history of rheumatoid arthritis  Family history of hypertension in mother  Family history of cerebral aneurysm (Sibling)      SOCIAL HISTORY:  unchanged    REVIEW OF SYSTEMS:  [x] Unable to obtain    PHYSICAL EXAM:  T(C): 36.4 (01-14-20 @ 03:00), Max: 37 (01-13-20 @ 15:00)  HR: 68 (01-14-20 @ 07:00) (54 - 108)  BP: --  RR: 20 (01-14-20 @ 07:00) (8 - 84)  SpO2: 100% (01-14-20 @ 07:00) (88% - 100%)  Wt(kg): --  I&O's Summary    13 Jan 2020 07:01  -  14 Jan 2020 07:00  --------------------------------------------------------  IN: 1985.6 mL / OUT: 350 mL / NET: 1635.6 mL        Appearance: Sedated  HEENT: Intubated  Carotid: [ x ] No bruits bilaterally    Lymphatic: No lymphadenopathy  Cardiovascular: [ x ] Normal S1 S2  [  ] No JVD  [  ] No murmurs +2/3 RLE edema   Respiratory: Lungs clear to auscultation	  Psychiatry: unable to assess  Gastrointestinal:  Soft, Non-tender, + BS	  Skin: [ x ] intact   Neurologic: Non-focal  Vascular:   Right Femoral:  Palpable   Left Femoral:  Palpable  Right Popliteal: Palpable   Left Popliteal:  palpable  Right DP:  Palpable             Left DP:  + doppler  Right PT:  Palpable              Left PT:  + doppler      LABS:	 	    CBC Full  -  ( 14 Jan 2020 05:24 )  WBC Count : 15.59 K/uL  Hemoglobin : 8.2 g/dL  Hematocrit : 24.4 %  Platelet Count - Automated : 348 K/uL  Mean Cell Volume : 90.0 fl  Mean Cell Hemoglobin : 30.3 pg  Mean Cell Hemoglobin Concentration : 33.6 gm/dL  Auto Neutrophil # : x  Auto Lymphocyte # : x  Auto Monocyte # : x  Auto Eosinophil # : x  Auto Basophil # : x  Auto Neutrophil % : x  Auto Lymphocyte % : x  Auto Monocyte % : x  Auto Eosinophil % : x  Auto Basophil % : x    01-14    136  |  98  |  34<H>  ----------------------------<  91  4.5   |  25  |  5.95<H>  01-14    132<L>  |  94<L>  |  31<H>  ----------------------------<  143<H>  4.4   |  22  |  5.82<H>    Ca    8.7      14 Jan 2020 05:24  Ca    8.1<L>      14 Jan 2020 01:12  Phos  6.3     01-14  Phos  6.7     01-14  Mg     2.8     01-14  Mg     2.9     01-14    TPro  4.9<L>  /  Alb  2.6<L>  /  TBili  1.3<H>  /  DBili  0.8<H>  /  AST  49<H>  /  ALT  10  /  AlkPhos  237<H>  01-14  TPro  5.1<L>  /  Alb  2.6<L>  /  TBili  1.6<H>  /  DBili  1.0<H>  /  AST  56<H>  /  ALT  10  /  AlkPhos  271<H>  01-14    INR: 1.17 ratio <H> (01-14-20 @ 05:24)  INR: 1.14 ratio (01-14-20 @ 01:12)  INR: 1.12 ratio (01-13-20 @ 21:40)  INR: 1.03 ratio (01-13-20 @ 18:20)          Assessment:  1. PE - likely provoked massive bilaterally PE in the setting of recent acute above-the-knee thrombus affecting the right external iliac, common femoral, femoral veins and right soleal vein s/p thrombectomy and filter placement. Overnight, CT's reviewed - appears patient has extensive thrombus above and below the IVC filter now. Hemodynamically patient required pressor initiation which has been weaned off as of this morning. Tolerating AC. Oxygenating well.   2. Pseudoaneurysm of right external iliac artery / transplant renal artery anastomosis - s/p operative repair of right external iliac artery with pericardial patch and transplant nephrectomy  3. Cardiac arrest   3. Perinephric hematoma complicated by infection     Plan:  1. Continue heparin gtt for now. No thrombolytic/thrombectomy therapy indicated at this time.  2. Eventual oral AC as patient displays stability.   3. Arrythmia management per general cardiolgy     Thank you,    Oj Moreau MD  Cardiology Fellow  Vascular Cardiology Service  Cell: [phone] Vascular Cardiology Progress Note    INTERVAL HISTORY: Last night cardiac arrest w/ work up and diagnosis of extensive b/l PE. Started on Levo which has been weaned off. On heparin gtt w/ stable blood counts. Patient remains intubated and sedated.     MEDICATIONS:  aMIOdarone Infusion 1 mG/Min IV Continuous <Continuous>  aMIOdarone Infusion 0.5 mG/Min IV Continuous <Continuous>  heparin  Infusion 1000 Unit(s)/Hr IV Continuous <Continuous>  norepinephrine Infusion 0.02 MICROgram(s)/kG/Min IV Continuous <Continuous>  ciprofloxacin   IVPB 400 milliGRAM(s) IV Intermittent every 24 hours  fluconAZOLE IVPB 400 milliGRAM(s) IV Intermittent every 24 hours  meropenem  IVPB 500 milliGRAM(s) IV Intermittent every 24 hours  fentaNYL   Infusion. 0.5 MICROgram(s)/kG/Hr IV Continuous <Continuous>  HYDROmorphone  Injectable 0.5 milliGRAM(s) IV Push every 3 hours PRN  LORazepam Infusion 0.031 mG/kG/Hr IV Continuous <Continuous>  pantoprazole  Injectable 40 milliGRAM(s) IV Push daily  methylPREDNISolone sodium succinate Injectable 4 milliGRAM(s) IV Push daily  brimonidine 0.2% Ophthalmic Solution 1 Drop(s) Both EYES three times a day  chlorhexidine 0.12% Liquid 15 milliLiter(s) Oral Mucosa <User Schedule>  chlorhexidine 2% Cloths 1 Application(s) Topical <User Schedule>  latanoprost 0.005% Ophthalmic Solution 1 Drop(s) Both EYES daily  petrolatum Ophthalmic Ointment 1 Application(s) Both EYES daily  sodium chloride 0.9%. 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  Carpal tunnel syndrome  Brain cyst: had MRI recently- now gone  Anemia  Gastric ulcer: Long time ago  Rheumatoid arthritis  Renal transplant recipient  Breakdown (mechanical) of penile (implanted) prosthesis, sequela  End-stage renal disease (ESRD): PERMACATH PLACEMENT  Abscess of left buttock: s/p I &amp; D  AV fistula: placement left lower arm  S/P cystoscopy: stent placement &amp; removal for kidney stones, lithiotripsy  s/p Lt Carpal tunnel: 2011      FAMILY HISTORY:  Family history of myocardial infarction in first degree male relative  Family history of rheumatoid arthritis  Family history of hypertension in mother  Family history of cerebral aneurysm (Sibling)      SOCIAL HISTORY:  unchanged    REVIEW OF SYSTEMS:  [x] Unable to obtain    PHYSICAL EXAM:  T(C): 36.4 (01-14-20 @ 03:00), Max: 37 (01-13-20 @ 15:00)  HR: 68 (01-14-20 @ 07:00) (54 - 108)  BP: --  RR: 20 (01-14-20 @ 07:00) (8 - 84)  SpO2: 100% (01-14-20 @ 07:00) (88% - 100%)  Wt(kg): --  I&O's Summary    13 Jan 2020 07:01  -  14 Jan 2020 07:00  --------------------------------------------------------  IN: 1985.6 mL / OUT: 350 mL / NET: 1635.6 mL        Appearance: Sedated  HEENT: Intubated  Carotid: [ x ] No bruits bilaterally    Lymphatic: No lymphadenopathy  Cardiovascular: [ x ] Normal S1 S2  [  ] No JVD  [  ] No murmurs +2/3 RLE edema   Respiratory: Lungs clear to auscultation	  Psychiatry: unable to assess  Gastrointestinal:  Soft, Non-tender, + BS	  Skin: [ x ] intact   Neurologic: Non-focal  Vascular:   Right Femoral:  Palpable   Left Femoral:  Palpable  Right Popliteal: Palpable   Left Popliteal:  palpable  Right DP:  Palpable             Left DP:  + doppler  Right PT:  Palpable              Left PT:  + doppler      LABS:	 	    CBC Full  -  ( 14 Jan 2020 05:24 )  WBC Count : 15.59 K/uL  Hemoglobin : 8.2 g/dL  Hematocrit : 24.4 %  Platelet Count - Automated : 348 K/uL  Mean Cell Volume : 90.0 fl  Mean Cell Hemoglobin : 30.3 pg  Mean Cell Hemoglobin Concentration : 33.6 gm/dL  Auto Neutrophil # : x  Auto Lymphocyte # : x  Auto Monocyte # : x  Auto Eosinophil # : x  Auto Basophil # : x  Auto Neutrophil % : x  Auto Lymphocyte % : x  Auto Monocyte % : x  Auto Eosinophil % : x  Auto Basophil % : x    01-14    136  |  98  |  34<H>  ----------------------------<  91  4.5   |  25  |  5.95<H>  01-14    132<L>  |  94<L>  |  31<H>  ----------------------------<  143<H>  4.4   |  22  |  5.82<H>    Ca    8.7      14 Jan 2020 05:24  Ca    8.1<L>      14 Jan 2020 01:12  Phos  6.3     01-14  Phos  6.7     01-14  Mg     2.8     01-14  Mg     2.9     01-14    TPro  4.9<L>  /  Alb  2.6<L>  /  TBili  1.3<H>  /  DBili  0.8<H>  /  AST  49<H>  /  ALT  10  /  AlkPhos  237<H>  01-14  TPro  5.1<L>  /  Alb  2.6<L>  /  TBili  1.6<H>  /  DBili  1.0<H>  /  AST  56<H>  /  ALT  10  /  AlkPhos  271<H>  01-14    INR: 1.17 ratio <H> (01-14-20 @ 05:24)  INR: 1.14 ratio (01-14-20 @ 01:12)  INR: 1.12 ratio (01-13-20 @ 21:40)  INR: 1.03 ratio (01-13-20 @ 18:20)

## 2020-01-14 NOTE — PROGRESS NOTE ADULT - ASSESSMENT
49 year old male with ESRD on HD, now s/p ddrt on 12/8/2019 course complicated by TMA/profound ATN, who presents from a HD center with a fever and cough. He was initially admitted with influenza. He is s/p washout of infected collection and biopsy which revealed ATN.   He is now found to have a diffusely enlarging perinephric fluid collection and is awaiting drainage.    While on telemetry, he had an episode of a wide complex tachycardia. It initially appears irregular, suggesting an atrial arrhythmia with aberrancy, though the remainder appears more like an episode of non-sustained VT.   s/p washout on 1/1/2020 with worsening sepsis and transfer to ICU, s/p IR drainage of perinephric collection on 1/6. 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 He underwent operative repair of right external iliac artery with pericardial patch, transplant nephrectomy, RLE thrombectomy, and IVC filter on 1/9.     Now s/p corrina ->tachy arrest with tdp, af rvr, now on amio, levo gtts, vented, with large PE    -critical events noted  -developed malignant arrhythmias which seemed to start with bradycardia, leading to more tachycardia and bursts of VT and rapid AF.  The apparent af degenerated into torsades.  He was shocked and has generally been maintaining sr since  -cont amio gtt for now  -arrhythmias were most likely triggered by the massive vte event, are unlikely to represent a primary cardiac issue  -cannot exclude an ischemic contribution, though the likelihood of that is small overall.  -hs trop noted, though in the context of his arrhythmias, and right heart strain from the pe, the trop is more likely to reflect demand than an acs event  -would not pursue cath now, though that is something that we could potentially consider pending his clinical course. his prior ischemic workup is unknown but was likely extensive  -ideally would be on asa, statin  -suggest serial ekgs  -serial ce until downtrend  -cont pressors and wean as tolerated  -remains vented  -vas medicine to evaluate but for now heparin gtt remains appropriate and the source of the thrombus is known  -echo results noted, with preserved/hyperdynamic lv, with RV failure consistent with acute RV overload

## 2020-01-14 NOTE — PROGRESS NOTE ADULT - ATTENDING COMMENTS
The patient is at risk of abrupt decompensation.  I have personally provided  35 minutes of critical care time, excluding time spent on separate procedures.

## 2020-01-14 NOTE — PROGRESS NOTE ADULT - SUBJECTIVE AND OBJECTIVE BOX
Dr. Philippe (Attending Physician)  cardiac arrest but following commands, will dc fentanyl gtt at 4 am with plan for sbt in am  Saddle PE, minimal vent settings decision made not to do catheter directed thrombolysis because of minimal vent settings and no hemodynamic support required. will sbt in am   RV strain on echo but no pressors required at this time, bolusing for episode of borderline hypotension   ESRD s/p explanted tx kidney dialyzed today  Hep gtt for PE    This patient was critically ill with one or more vital organ systems at a high probability of imminent or life threatening deterioration. Complex decision making was required to assess and treat single or multiple vital organ system failure and/or prevent further life threatening deterioration of the patient’s condition.  All recent labwork and imaging was reviewed.  Total Critical Care Time 35 min

## 2020-01-15 LAB
ALBUMIN SERPL ELPH-MCNC: 2.4 G/DL — LOW (ref 3.3–5)
ALBUMIN SERPL ELPH-MCNC: 2.5 G/DL — LOW (ref 3.3–5)
ALBUMIN SERPL ELPH-MCNC: 2.6 G/DL — LOW (ref 3.3–5)
ALP SERPL-CCNC: 182 U/L — HIGH (ref 40–120)
ALP SERPL-CCNC: 191 U/L — HIGH (ref 40–120)
ALP SERPL-CCNC: 194 U/L — HIGH (ref 40–120)
ALT FLD-CCNC: 5 U/L — LOW (ref 10–45)
ALT FLD-CCNC: 6 U/L — LOW (ref 10–45)
ALT FLD-CCNC: 6 U/L — LOW (ref 10–45)
ANION GAP SERPL CALC-SCNC: 12 MMOL/L — SIGNIFICANT CHANGE UP (ref 5–17)
ANION GAP SERPL CALC-SCNC: 15 MMOL/L — SIGNIFICANT CHANGE UP (ref 5–17)
ANION GAP SERPL CALC-SCNC: 19 MMOL/L — HIGH (ref 5–17)
APTT BLD: 60.3 SEC — HIGH (ref 27.5–36.3)
APTT BLD: 61 SEC — HIGH (ref 27.5–36.3)
APTT BLD: 68.6 SEC — HIGH (ref 27.5–36.3)
AST SERPL-CCNC: 18 U/L — SIGNIFICANT CHANGE UP (ref 10–40)
AST SERPL-CCNC: 18 U/L — SIGNIFICANT CHANGE UP (ref 10–40)
AST SERPL-CCNC: 22 U/L — SIGNIFICANT CHANGE UP (ref 10–40)
BILIRUB DIRECT SERPL-MCNC: 0.4 MG/DL — HIGH (ref 0–0.2)
BILIRUB INDIRECT FLD-MCNC: 0.3 MG/DL — SIGNIFICANT CHANGE UP (ref 0.2–1)
BILIRUB INDIRECT FLD-MCNC: 0.4 MG/DL — SIGNIFICANT CHANGE UP (ref 0.2–1)
BILIRUB INDIRECT FLD-MCNC: 0.4 MG/DL — SIGNIFICANT CHANGE UP (ref 0.2–1)
BILIRUB SERPL-MCNC: 0.7 MG/DL — SIGNIFICANT CHANGE UP (ref 0.2–1.2)
BILIRUB SERPL-MCNC: 0.8 MG/DL — SIGNIFICANT CHANGE UP (ref 0.2–1.2)
BILIRUB SERPL-MCNC: 0.8 MG/DL — SIGNIFICANT CHANGE UP (ref 0.2–1.2)
BUN SERPL-MCNC: 22 MG/DL — SIGNIFICANT CHANGE UP (ref 7–23)
BUN SERPL-MCNC: 22 MG/DL — SIGNIFICANT CHANGE UP (ref 7–23)
BUN SERPL-MCNC: 24 MG/DL — HIGH (ref 7–23)
CALCIUM SERPL-MCNC: 7.9 MG/DL — LOW (ref 8.4–10.5)
CALCIUM SERPL-MCNC: 8 MG/DL — LOW (ref 8.4–10.5)
CALCIUM SERPL-MCNC: 8.2 MG/DL — LOW (ref 8.4–10.5)
CHLORIDE SERPL-SCNC: 100 MMOL/L — SIGNIFICANT CHANGE UP (ref 96–108)
CHLORIDE SERPL-SCNC: 97 MMOL/L — SIGNIFICANT CHANGE UP (ref 96–108)
CHLORIDE SERPL-SCNC: 97 MMOL/L — SIGNIFICANT CHANGE UP (ref 96–108)
CO2 SERPL-SCNC: 22 MMOL/L — SIGNIFICANT CHANGE UP (ref 22–31)
CO2 SERPL-SCNC: 24 MMOL/L — SIGNIFICANT CHANGE UP (ref 22–31)
CO2 SERPL-SCNC: 25 MMOL/L — SIGNIFICANT CHANGE UP (ref 22–31)
CREAT SERPL-MCNC: 4.64 MG/DL — HIGH (ref 0.5–1.3)
CREAT SERPL-MCNC: 4.91 MG/DL — HIGH (ref 0.5–1.3)
CREAT SERPL-MCNC: 5.16 MG/DL — HIGH (ref 0.5–1.3)
CULTURE RESULTS: SIGNIFICANT CHANGE UP
GAS PNL BLDA: SIGNIFICANT CHANGE UP
GLUCOSE SERPL-MCNC: 85 MG/DL — SIGNIFICANT CHANGE UP (ref 70–99)
GLUCOSE SERPL-MCNC: 87 MG/DL — SIGNIFICANT CHANGE UP (ref 70–99)
GLUCOSE SERPL-MCNC: 90 MG/DL — SIGNIFICANT CHANGE UP (ref 70–99)
HCT VFR BLD CALC: 22.7 % — LOW (ref 39–50)
HCT VFR BLD CALC: 23.8 % — LOW (ref 39–50)
HCT VFR BLD CALC: 23.8 % — LOW (ref 39–50)
HGB BLD-MCNC: 7.5 G/DL — LOW (ref 13–17)
HGB BLD-MCNC: 7.6 G/DL — LOW (ref 13–17)
HGB BLD-MCNC: 7.7 G/DL — LOW (ref 13–17)
INR BLD: 1.18 RATIO — HIGH (ref 0.88–1.16)
INR BLD: 1.19 RATIO — HIGH (ref 0.88–1.16)
INR BLD: 1.21 RATIO — HIGH (ref 0.88–1.16)
MAGNESIUM SERPL-MCNC: 2.3 MG/DL — SIGNIFICANT CHANGE UP (ref 1.6–2.6)
MAGNESIUM SERPL-MCNC: 2.3 MG/DL — SIGNIFICANT CHANGE UP (ref 1.6–2.6)
MAGNESIUM SERPL-MCNC: 2.4 MG/DL — SIGNIFICANT CHANGE UP (ref 1.6–2.6)
MCHC RBC-ENTMCNC: 29.8 PG — SIGNIFICANT CHANGE UP (ref 27–34)
MCHC RBC-ENTMCNC: 30.1 PG — SIGNIFICANT CHANGE UP (ref 27–34)
MCHC RBC-ENTMCNC: 30.1 PG — SIGNIFICANT CHANGE UP (ref 27–34)
MCHC RBC-ENTMCNC: 31.9 GM/DL — LOW (ref 32–36)
MCHC RBC-ENTMCNC: 32.4 GM/DL — SIGNIFICANT CHANGE UP (ref 32–36)
MCHC RBC-ENTMCNC: 33 GM/DL — SIGNIFICANT CHANGE UP (ref 32–36)
MCV RBC AUTO: 91.2 FL — SIGNIFICANT CHANGE UP (ref 80–100)
MCV RBC AUTO: 93 FL — SIGNIFICANT CHANGE UP (ref 80–100)
MCV RBC AUTO: 93.3 FL — SIGNIFICANT CHANGE UP (ref 80–100)
NRBC # BLD: 0 /100 WBCS — SIGNIFICANT CHANGE UP (ref 0–0)
ORGANISM # SPEC MICROSCOPIC CNT: SIGNIFICANT CHANGE UP
ORGANISM # SPEC MICROSCOPIC CNT: SIGNIFICANT CHANGE UP
PHOSPHATE SERPL-MCNC: 4.3 MG/DL — SIGNIFICANT CHANGE UP (ref 2.5–4.5)
PHOSPHATE SERPL-MCNC: 4.5 MG/DL — SIGNIFICANT CHANGE UP (ref 2.5–4.5)
PHOSPHATE SERPL-MCNC: 4.5 MG/DL — SIGNIFICANT CHANGE UP (ref 2.5–4.5)
PLATELET # BLD AUTO: 365 K/UL — SIGNIFICANT CHANGE UP (ref 150–400)
PLATELET # BLD AUTO: 372 K/UL — SIGNIFICANT CHANGE UP (ref 150–400)
PLATELET # BLD AUTO: 378 K/UL — SIGNIFICANT CHANGE UP (ref 150–400)
POTASSIUM SERPL-MCNC: 3.8 MMOL/L — SIGNIFICANT CHANGE UP (ref 3.5–5.3)
POTASSIUM SERPL-MCNC: 3.9 MMOL/L — SIGNIFICANT CHANGE UP (ref 3.5–5.3)
POTASSIUM SERPL-MCNC: 3.9 MMOL/L — SIGNIFICANT CHANGE UP (ref 3.5–5.3)
POTASSIUM SERPL-SCNC: 3.8 MMOL/L — SIGNIFICANT CHANGE UP (ref 3.5–5.3)
POTASSIUM SERPL-SCNC: 3.9 MMOL/L — SIGNIFICANT CHANGE UP (ref 3.5–5.3)
POTASSIUM SERPL-SCNC: 3.9 MMOL/L — SIGNIFICANT CHANGE UP (ref 3.5–5.3)
PROT SERPL-MCNC: 4.8 G/DL — LOW (ref 6–8.3)
PROT SERPL-MCNC: 4.9 G/DL — LOW (ref 6–8.3)
PROT SERPL-MCNC: 5.1 G/DL — LOW (ref 6–8.3)
PROTHROM AB SERPL-ACNC: 13.6 SEC — HIGH (ref 10–12.9)
PROTHROM AB SERPL-ACNC: 13.7 SEC — HIGH (ref 10–12.9)
PROTHROM AB SERPL-ACNC: 13.9 SEC — HIGH (ref 10–12.9)
RBC # BLD: 2.49 M/UL — LOW (ref 4.2–5.8)
RBC # BLD: 2.55 M/UL — LOW (ref 4.2–5.8)
RBC # BLD: 2.56 M/UL — LOW (ref 4.2–5.8)
RBC # FLD: 16.2 % — HIGH (ref 10.3–14.5)
RBC # FLD: 16.3 % — HIGH (ref 10.3–14.5)
RBC # FLD: 16.4 % — HIGH (ref 10.3–14.5)
SODIUM SERPL-SCNC: 136 MMOL/L — SIGNIFICANT CHANGE UP (ref 135–145)
SODIUM SERPL-SCNC: 137 MMOL/L — SIGNIFICANT CHANGE UP (ref 135–145)
SODIUM SERPL-SCNC: 138 MMOL/L — SIGNIFICANT CHANGE UP (ref 135–145)
SPECIMEN SOURCE: SIGNIFICANT CHANGE UP
SURGICAL PATHOLOGY STUDY: SIGNIFICANT CHANGE UP
WBC # BLD: 13.93 K/UL — HIGH (ref 3.8–10.5)
WBC # BLD: 16.76 K/UL — HIGH (ref 3.8–10.5)
WBC # BLD: 20.49 K/UL — HIGH (ref 3.8–10.5)
WBC # FLD AUTO: 13.93 K/UL — HIGH (ref 3.8–10.5)
WBC # FLD AUTO: 16.76 K/UL — HIGH (ref 3.8–10.5)
WBC # FLD AUTO: 20.49 K/UL — HIGH (ref 3.8–10.5)

## 2020-01-15 PROCEDURE — 49406 IMAGE CATH FLUID PERI/RETRO: CPT | Mod: 78

## 2020-01-15 PROCEDURE — 99232 SBSQ HOSP IP/OBS MODERATE 35: CPT | Mod: GC

## 2020-01-15 PROCEDURE — 93306 TTE W/DOPPLER COMPLETE: CPT | Mod: 26

## 2020-01-15 PROCEDURE — 71045 X-RAY EXAM CHEST 1 VIEW: CPT | Mod: 26

## 2020-01-15 PROCEDURE — 76705 ECHO EXAM OF ABDOMEN: CPT | Mod: 26

## 2020-01-15 PROCEDURE — 99291 CRITICAL CARE FIRST HOUR: CPT

## 2020-01-15 PROCEDURE — 99233 SBSQ HOSP IP/OBS HIGH 50: CPT

## 2020-01-15 PROCEDURE — 93010 ELECTROCARDIOGRAM REPORT: CPT

## 2020-01-15 PROCEDURE — 99232 SBSQ HOSP IP/OBS MODERATE 35: CPT

## 2020-01-15 RX ORDER — CALCIUM GLUCONATE 100 MG/ML
2 VIAL (ML) INTRAVENOUS ONCE
Refills: 0 | Status: COMPLETED | OUTPATIENT
Start: 2020-01-15 | End: 2020-01-15

## 2020-01-15 RX ORDER — HYDROMORPHONE HYDROCHLORIDE 2 MG/ML
0.5 INJECTION INTRAMUSCULAR; INTRAVENOUS; SUBCUTANEOUS ONCE
Refills: 0 | Status: DISCONTINUED | OUTPATIENT
Start: 2020-01-15 | End: 2020-01-15

## 2020-01-15 RX ORDER — SODIUM CHLORIDE 9 MG/ML
1000 INJECTION, SOLUTION INTRAVENOUS
Refills: 0 | Status: DISCONTINUED | OUTPATIENT
Start: 2020-01-15 | End: 2020-01-17

## 2020-01-15 RX ADMIN — Medication 750 MILLILITER(S): at 23:00

## 2020-01-15 RX ADMIN — FLUCONAZOLE 100 MILLIGRAM(S): 150 TABLET ORAL at 07:05

## 2020-01-15 RX ADMIN — MIDAZOLAM HYDROCHLORIDE 2 MILLIGRAM(S): 1 INJECTION, SOLUTION INTRAMUSCULAR; INTRAVENOUS at 05:34

## 2020-01-15 RX ADMIN — HYDROMORPHONE HYDROCHLORIDE 0.5 MILLIGRAM(S): 2 INJECTION INTRAMUSCULAR; INTRAVENOUS; SUBCUTANEOUS at 13:35

## 2020-01-15 RX ADMIN — AMIODARONE HYDROCHLORIDE 16.67 MG/MIN: 400 TABLET ORAL at 07:32

## 2020-01-15 RX ADMIN — MEROPENEM 100 MILLIGRAM(S): 1 INJECTION INTRAVENOUS at 21:34

## 2020-01-15 RX ADMIN — BRIMONIDINE TARTRATE 1 DROP(S): 2 SOLUTION/ DROPS OPHTHALMIC at 05:12

## 2020-01-15 RX ADMIN — BRIMONIDINE TARTRATE 1 DROP(S): 2 SOLUTION/ DROPS OPHTHALMIC at 21:33

## 2020-01-15 RX ADMIN — Medication 200 GRAM(S): at 05:12

## 2020-01-15 RX ADMIN — HYDROMORPHONE HYDROCHLORIDE 0.5 MILLIGRAM(S): 2 INJECTION INTRAMUSCULAR; INTRAVENOUS; SUBCUTANEOUS at 22:26

## 2020-01-15 RX ADMIN — HEPARIN SODIUM 9 UNIT(S)/HR: 5000 INJECTION INTRAVENOUS; SUBCUTANEOUS at 07:33

## 2020-01-15 RX ADMIN — HYDROMORPHONE HYDROCHLORIDE 0.5 MILLIGRAM(S): 2 INJECTION INTRAMUSCULAR; INTRAVENOUS; SUBCUTANEOUS at 18:33

## 2020-01-15 RX ADMIN — CHLORHEXIDINE GLUCONATE 1 APPLICATION(S): 213 SOLUTION TOPICAL at 05:13

## 2020-01-15 RX ADMIN — Medication 200 MILLIGRAM(S): at 11:00

## 2020-01-15 RX ADMIN — HYDROMORPHONE HYDROCHLORIDE 0.5 MILLIGRAM(S): 2 INJECTION INTRAMUSCULAR; INTRAVENOUS; SUBCUTANEOUS at 18:00

## 2020-01-15 RX ADMIN — CHLORHEXIDINE GLUCONATE 15 MILLILITER(S): 213 SOLUTION TOPICAL at 14:40

## 2020-01-15 RX ADMIN — SODIUM CHLORIDE 50 MILLILITER(S): 9 INJECTION, SOLUTION INTRAVENOUS at 14:43

## 2020-01-15 RX ADMIN — BRIMONIDINE TARTRATE 1 DROP(S): 2 SOLUTION/ DROPS OPHTHALMIC at 14:40

## 2020-01-15 RX ADMIN — LATANOPROST 1 DROP(S): 0.05 SOLUTION/ DROPS OPHTHALMIC; TOPICAL at 11:00

## 2020-01-15 RX ADMIN — Medication 4 MILLIGRAM(S): at 05:13

## 2020-01-15 RX ADMIN — Medication 1 APPLICATION(S): at 11:01

## 2020-01-15 RX ADMIN — HYDROMORPHONE HYDROCHLORIDE 0.5 MILLIGRAM(S): 2 INJECTION INTRAMUSCULAR; INTRAVENOUS; SUBCUTANEOUS at 20:24

## 2020-01-15 RX ADMIN — SODIUM CHLORIDE 50 MILLILITER(S): 9 INJECTION, SOLUTION INTRAVENOUS at 19:27

## 2020-01-15 RX ADMIN — CHLORHEXIDINE GLUCONATE 15 MILLILITER(S): 213 SOLUTION TOPICAL at 21:33

## 2020-01-15 RX ADMIN — HYDROMORPHONE HYDROCHLORIDE 0.5 MILLIGRAM(S): 2 INJECTION INTRAMUSCULAR; INTRAVENOUS; SUBCUTANEOUS at 22:11

## 2020-01-15 RX ADMIN — PANTOPRAZOLE SODIUM 40 MILLIGRAM(S): 20 TABLET, DELAYED RELEASE ORAL at 11:00

## 2020-01-15 RX ADMIN — AMIODARONE HYDROCHLORIDE 16.67 MG/MIN: 400 TABLET ORAL at 19:28

## 2020-01-15 RX ADMIN — SODIUM CHLORIDE 50 MILLILITER(S): 9 INJECTION INTRAMUSCULAR; INTRAVENOUS; SUBCUTANEOUS at 07:33

## 2020-01-15 RX ADMIN — AMIODARONE HYDROCHLORIDE 16.67 MG/MIN: 400 TABLET ORAL at 05:12

## 2020-01-15 RX ADMIN — SODIUM CHLORIDE 50 MILLILITER(S): 9 INJECTION INTRAMUSCULAR; INTRAVENOUS; SUBCUTANEOUS at 05:12

## 2020-01-15 RX ADMIN — HYDROMORPHONE HYDROCHLORIDE 0.5 MILLIGRAM(S): 2 INJECTION INTRAMUSCULAR; INTRAVENOUS; SUBCUTANEOUS at 20:39

## 2020-01-15 RX ADMIN — HEPARIN SODIUM 9 UNIT(S)/HR: 5000 INJECTION INTRAVENOUS; SUBCUTANEOUS at 05:12

## 2020-01-15 RX ADMIN — CHLORHEXIDINE GLUCONATE 15 MILLILITER(S): 213 SOLUTION TOPICAL at 05:13

## 2020-01-15 RX ADMIN — HEPARIN SODIUM 9 UNIT(S)/HR: 5000 INJECTION INTRAVENOUS; SUBCUTANEOUS at 19:29

## 2020-01-15 RX ADMIN — HYDROMORPHONE HYDROCHLORIDE 0.5 MILLIGRAM(S): 2 INJECTION INTRAMUSCULAR; INTRAVENOUS; SUBCUTANEOUS at 13:21

## 2020-01-15 RX ADMIN — MIDAZOLAM HYDROCHLORIDE 2 MILLIGRAM(S): 1 INJECTION, SOLUTION INTRAMUSCULAR; INTRAVENOUS at 19:26

## 2020-01-15 NOTE — PROGRESS NOTE ADULT - SUBJECTIVE AND OBJECTIVE BOX
Interventional Radiology Pre-Procedure Note    This is a 49y Male with pelvic fluid collection presenting for drainage    Procedure: IUmage guided drainage of pelvic collection    Diagnosis/Indication: Patient is a 49y old  Male who presents with a chief complaint of Fever (15 [place] 2020 13:19). He is s/p renal transplant explant with elevated WBC. Fluid collection identified in the right pelvis. Case discussed with Dr. Floyd. Clinical service is strongly concerned for infection. Drainage is requested.       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  Carpal tunnel syndrome  Brain cyst: had MRI recently- now gone  Anemia  Gastric ulcer: Long time ago  Rheumatoid arthritis  Renal transplant recipient  Breakdown (mechanical) of penile (implanted) prosthesis, sequela  End-stage renal disease (ESRD): PERMACATH PLACEMENT  Abscess of left buttock: s/p I &amp; D  AV fistula: placement left lower arm  S/P cystoscopy: stent placement &amp; removal for kidney stones, lithiotripsy  s/p Lt Carpal tunnel: 2011       Male    Allergies: coconut (Anaphylaxis)  morphine (Pruritus; Rash)      LABS:  CBC Full  -  ( 15 [place] 2020 16:11 )  WBC Count : 20.49 K/uL  RBC Count : 2.55 M/uL  Hemoglobin : 7.6 g/dL  Hematocrit : 23.8 %  Platelet Count - Automated : 372 K/uL  Mean Cell Volume : 93.3 fl  Mean Cell Hemoglobin : 29.8 pg  Mean Cell Hemoglobin Concentration : 31.9 gm/dL  Auto Neutrophil # : x  Auto Lymphocyte # : x  Auto Monocyte # : x  Auto Eosinophil # : x  Auto Basophil # : x  Auto Neutrophil % : x  Auto Lymphocyte % : x  Auto Monocyte % : x  Auto Eosinophil % : x  Auto Basophil % : x    01-15    138  |  97  |  24<H>  ----------------------------<  90  3.9   |  22  |  5.16<H>    Ca    7.9<L>      15 [place] 2020 16:11  Phos  4.5     01-15  Mg     2.3     01-15    TPro  4.8<L>  /  Alb  2.4<L>  /  TBili  0.7  /  DBili  0.4<H>  /  AST  18  /  ALT  6<L>  /  AlkPhos  182<H>  01-15    PT/INR - ( 15 [place] 2020 16:11 )   PT: 13.9 sec;   INR: 1.21 ratio         PTT - ( 15 [place] 2020 16:11 )  PTT:60.3 sec    A/P Case discussed with Dr. Floyd. As patient has recent PE, bedside drainage was requested.  Procedure/ risks/ benefits were explained with the patient's family, informed consent obtained from patient, verbalizes understanding.

## 2020-01-15 NOTE — PROGRESS NOTE ADULT - ASSESSMENT
49y Male with ESRD s/p DDRT on 12/8/19 with course complicated by delayed graft function requiring HD, and infected perinephric hematoma s/p washout on 1/1/2020 with worsening sepsis and transfer to ICU, s/p IR drainage of perinephric collection on 1/6. 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 He underwent operative repair of right external iliac artery with bovine patch, transplant nephrectomy, RLE thrombectomy, and IVC filter on 1/10. Post-op course complicated by cardiac arrest secondary to saddle PE.    - Continue heparin infusion  - Monitor CBC, coags  - Routine neurovascular checks  - Continue care as per SICU and Transplant teams    VASCULAR [phone]

## 2020-01-15 NOTE — PROGRESS NOTE ADULT - SUBJECTIVE AND OBJECTIVE BOX
SURGICAL INTENSIVE CARE UNIT DAILY PROGRESS NOTE    24 HOUR EVENTS:   - Tolerated HD, stable.   - Received 250 5% albumin.  - No acute events overnight.     HPI:  49y Male ESRD on HD  (via LUE AVF), HTN, Gout, Glaucoma, PNET s/p robotic distal panc/splenectomy (at [facility] 2015 by Dr. Young, followed by Dr. Raphael, [facility] Oncologist), RA with hand contractures. He underwent DDRT on 12/8/19 (ureteral stent sutured to Martinez - removed 12/15). Post op course c/b DGF requiring HD, thrombocytopenia, elevated LDH and Haptoglobulin. Schistocytes  on peripheral smear concerning for TMA. Envarsus held. Received Belatacept 12/13. Started on PLEX (12/12, 12/15). Underwent renal bx on 12/13 c/w profound ATN.  Found to have much improvement to levels, likely related to Tacrolimus. He was discharged home on 12/20/19 w/ outpatient HD.     He was now sent to ED by dialysis center after he was found to be febrile to 102, he did not get HD this AM, and last full HD session was on 12/26. Upon arrival, he was febrile to 101.7, other vitals were stable. He states that he had a cough that began 5-6 days ago that has now mostly resolved. He reports no other febrile episodes other than this morning. He admits to having one SOB episode earlier this week that resolved after he got HD. He also states that his mother was hospitalized earlier this week and was flu+. Currently makes less than 1 cup urine/daily. He currently denies SOB, CP, dyspnea, HA, dizziness, N/V, diarrhea, constipation.     1/1/2020 was taken to OR and found to have a perinephric hematoma. Underwent washout, evacuation of hematoma, and biopsy of transplanted kidney. He was recovering on the floor when overnight he spiked a fever to 38.1 and had 18 beats of wide complex tachycardia. He was also found to have worsening leukocytosis and became acutely hypotensive. A CT scan was obtained demonstrating a 4.5x3.7 perinephric collection. IR placed a drain on 1/6. Post-procedure, patient began having gross hematuria. CBI was started with improvement in hematuria. Repeat renal Duplex on 1/7 demonstrated no change in the perinephric collection. RLE was noted to be more swollen so a LE Duplex was obtained on 1/8, which demonstrated a right external iliac and common femoral DVT so a heparin infusion was started. However, patient had worsening hematuria w/ downtrending HCT. 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 A stent was placed to temporize the patient and he was taken to the OR early on 1/10 for an emergent right groin exploration, RP washout of hematoma, transplant nephrectomy, right external iliac artery repair w/ bovine patch, thrombectomy of the RLE veins, removal of the IR stent, and external iliac vein ligation. Case was uneventful and patient returned to SICU intubated and on vasopressor support. He was extubated and weaned off vasopressor support shortly after return to SICU.    On 1/14 had episode of PEA arrest, ACLS performed with ROSC 7 min with shockx2. Intubated, hypotensive, found to have saddle PE. Received heparin bolus, continued on therapeutic A/C.     Recipient info:   CPRA:    0%  ABO:      A+  CMVAb:  Positive  EBVIgG Status:  Positive  Last HD:  12/7/2019    Donor ID:  [id number]  Match: [id number]  OPO: NYRT  Age:  49  ABO:  A  KDPI 81%  COD:  Anoxia  X Clamp Time:  12/7/2019 1538.  Medical Hx: DM, HTN, HLD, obesity BMI 53, CAD, CABAGX3  Terminal Cr:  1/1.8/1.7  CMV- Neg EBV- Neg    OR:    DDRT to right iliac fossa. Simulect induction. Two arteries anastomosed to a single cuff on the back table. One vein, one ureter. Ureteral anastomosis performed over a double J stent, which was sutured to the martinez.   Cold ischemia time 25.5 hours. (27 Dec 2019 10:45)      NEURO  Exam: sedated, easily arousable, intermittently agitated, follows commands, no acute distress, moving all four extremities spontaneously    RESPIRATORY  RR: 15 (01-15-20 @ 02:00) (15 - 23)  SpO2: 100% (01-15-20 @ 02:00) (98% - 100%)  Mechanical Ventilation: Mode: AC/ CMV (Assist Control/ Continuous Mandatory Ventilation), RR (machine): 14, RR (patient): 22, TV (machine): 450, FiO2: 30, PEEP: 5, ITime: 0.9, MAP: 9, PIP: 16  ABG - ( 15 [place] 2020 00:07 )  pH: 7.50  /  pCO2: 34    /  pO2: 145   / HCO3: 26    / Base Excess: 3.3   /  SaO2: 99          CARDIOVASCULAR  HR: 67 (01-15-20 @ 02:00) (60 - 72)  BP: 87/55 (01-15-20 @ 01:20) (87/55 - 87/55)  BP(mean): 67 (01-15-20 @ 01:20) (67 - 67)  ABP: 105/51 (01-15-20 @ 02:00) (91/45 - 133/71)  ABP(mean): 71 (01-15-20 @ 02:00) (60 - 95)    GENITOURINARY  I&O's Detail    01-13 @ 07:01 - 01-14 @ 07:00  --------------------------------------------------------  IN:    amiodarone Infusion: 199.8 mL    amiodarone Infusion: 133.6 mL    fentaNYL Infusion.: 20.8 mL    heparin Infusion: 76.5 mL    heparin Infusion: 55.5 mL    heparin Infusion: 74 mL    LORazepam  Infusion: 12 mL    LORazepam  Infusion: 4 mL    norepinephrine Infusion: 59.4 mL    sodium chloride 0.9%.: 400 mL    Solution: 200 mL    Solution: 750 mL  Total IN: 1985.6 mL    OUT:    VAC (Vacuum Assisted Closure) System: 350 mL  Total OUT: 350 mL    Total NET: 1635.6 mL      01-14 @ 07:01  -  01-15 @ 03:14  --------------------------------------------------------  IN:    amiodarone Infusion: 217.1 mL    amiodarone Infusion: 100.2 mL    fentaNYL Infusion.: 30.4 mL    heparin Infusion: 171 mL    LORazepam  Infusion: 16 mL    Other: 600 mL    sodium chloride 0.9%.: 950 mL    Solution: 100 mL  Total IN: 2184.7 mL    OUT:    Nasoenteral Tube: 150 mL    Other: 600 mL  Total OUT: 750 mL    Total NET: 1434.7 mL          01-15    136  |  97  |  22  ----------------------------<  87  3.9   |  24  |  4.64<H>    Ca    8.0<L>      15 [place] 2020 00:18  Phos  4.3     01-15  Mg     2.3     01-15    TPro  4.9<L>  /  Alb  2.5<L>  /  TBili  0.8  /  DBili  0.4<H>  /  AST  22  /  ALT  6<L>  /  AlkPhos  194<H>  01-15      HEMATOLOGIC                        7.5    13.93 )-----------( 365      ( 15 [place] 2020 00:18 )             22.7     PT/INR - ( 15 [place] 2020 00:18 )   PT: 13.6 sec;   INR: 1.18 ratio         PTT - ( 15 [place] 2020 00:18 )  PTT:68.6 sec    INFECTIOUS DISEASES  RECENT CULTURES:  Specimen Source: .Tissue Other  Date/Time: 01-10 @ 06:07  Culture Results:   No growth  Gram Stain:   No polymorphonuclear cells seen per low power field  No organisms seen per oil power field  Organism: Enterobacter cloacae (Carbapenem Resistant)      ENDOCRINE  CAPILLARY BLOOD GLUCOSE      IMAGING:  < from: CT Abdomen and Pelvis w/ IV Cont (01.13.20 @ 20:11) >  IMPRESSION:     Saddle pulmonary embolus with extension into the right upper, right lower, and left lower lobar arteries with involvement of many of the distal segmental and subsegmental branches.    Dr. Dietrich discussed these findings with Dr. Deshpande on 1/13/2020 8:21 PM with read back.    Small right pneumothorax. Interval increased size of a rim-enhancing retroperitoneal fluid collection containing a focus of air. This may represent a hematoma, infected hematoma, seroma, or abscess. There is thrombus at the level of the IVC filter. Some of the thrombus is tracking laterally and superiorly to the filter.

## 2020-01-15 NOTE — PROGRESS NOTE ADULT - SUBJECTIVE AND OBJECTIVE BOX
INFECTIOUS DISEASES FOLLOW UP--Oj Finnegan MD  Pager [phone]    This is a follow up note for this  49y Male with  infected renal graft with enterobacter.  intubated after arrest.    Further ROS:  intubated and sedated    Allergies    coconut (Anaphylaxis)  morphine (Pruritus; Rash)    Intolerances    ANTIBIOTICS/RELEVANT:  antimicrobials  ciprofloxacin   IVPB 400 milliGRAM(s) IV Intermittent every 24 hours  fluconAZOLE IVPB 400 milliGRAM(s) IV Intermittent every 24 hours  meropenem  IVPB 500 milliGRAM(s) IV Intermittent every 24 hours    immunologic:    OTHER:  aMIOdarone Infusion 0.5 mG/Min IV Continuous <Continuous>  brimonidine 0.2% Ophthalmic Solution 1 Drop(s) Both EYES three times a day  chlorhexidine 0.12% Liquid 15 milliLiter(s) Oral Mucosa <User Schedule>  chlorhexidine 2% Cloths 1 Application(s) Topical <User Schedule>  fentaNYL   Infusion. 0.5 MICROgram(s)/kG/Hr IV Continuous <Continuous>  heparin  Infusion 1000 Unit(s)/Hr IV Continuous <Continuous>  HYDROmorphone  Injectable 0.5 milliGRAM(s) IV Push every 3 hours PRN  latanoprost 0.005% Ophthalmic Solution 1 Drop(s) Both EYES daily  methylPREDNISolone sodium succinate Injectable 4 milliGRAM(s) IV Push daily  midazolam Injectable 2 milliGRAM(s) IV Push every 2 hours PRN  pantoprazole  Injectable 40 milliGRAM(s) IV Push daily  petrolatum Ophthalmic Ointment 1 Application(s) Both EYES daily  sodium chloride 0.9%. 1000 milliLiter(s) IV Continuous <Continuous>    Objective:  Vital Signs Last 24 Hrs  T(C): 36.9 (15 [place] 2020 07:00), Max: 37 (15 [place] 2020 03:00)  T(F): 98.4 (15 [place] 2020 07:00), Max: 98.6 (15 [place] 2020 03:00)  HR: 74 (15 [place] 2020 10:00) (60 - 75)  BP: 87/55 (15 [place] 2020 01:20) (87/55 - 87/55)  BP(mean): 67 (15 [place] 2020 01:20) (67 - 67)  RR: 18 (15 [place] 2020 10:00) (15 - 23)  SpO2: 99% (15 [place] 2020 10:00) (98% - 100%)    PHYSICAL EXAM:  intubated and sedated  Constitutional:no acute distress  Ear/Nose/Throat: no oral lesions, 	  Respiratory: clear BL  Cardiovascular: S1S2  Gastrointestinal:soft, (+) BS, no tenderness  Extremities:no e/e/c  No Lymphadenopathy  IV sites not inflammed.    LABS:                        7.7    16.76 )-----------( 378      ( 15 [place] 2020 08:20 )             23.8     01-15    137  |  100  |  22  ----------------------------<  85  3.8   |  25  |  4.91<H>    Ca    8.2<L>      15 [place] 2020 08:20  Phos  4.5     01-15  Mg     2.4     01-15    TPro  5.1<L>  /  Alb  2.6<L>  /  TBili  0.8  /  DBili  0.4<H>  /  AST  18  /  ALT  5<L>  /  AlkPhos  191<H>  01-15  PT/INR - ( 15 [place] 2020 08:20 )   PT: 13.7 sec;   INR: 1.19 ratio    PTT - ( 15 [place] 2020 08:20 )  PTT:61.0 sec    MICROBIOLOGY: no fungi present    RADIOLOGY & ADDITIONAL STUDIES:    < from: Xray Chest 1 View- PORTABLE-Routine (01.15.20 @ 06:50) >    The heart is normal in size. Right lower lobe pneumonia and/or atelectasis cannot be ruled out. Left lung appears to be clear. Endotracheal tube and NG tube are in good position. No pneumothorax.    < end of copied text >

## 2020-01-15 NOTE — PROGRESS NOTE ADULT - SUBJECTIVE AND OBJECTIVE BOX
VASCULAR SURGERY DAILY PROGRESS NOTE:       SUBJECTIVE/ROS: Patient seen and examined on morning rounds.  Currently intubated.    24 HOUR EVENTS:   - Tolerated HD, stable.   - Received 250 5% albumin.  - No acute events overnight.          MEDICATIONS  (STANDING):  aMIOdarone Infusion 0.5 mG/Min (16.667 mL/Hr) IV Continuous <Continuous>  brimonidine 0.2% Ophthalmic Solution 1 Drop(s) Both EYES three times a day  chlorhexidine 0.12% Liquid 15 milliLiter(s) Oral Mucosa <User Schedule>  chlorhexidine 2% Cloths 1 Application(s) Topical <User Schedule>  ciprofloxacin   IVPB 400 milliGRAM(s) IV Intermittent every 24 hours  fentaNYL   Infusion. 0.5 MICROgram(s)/kG/Hr (1.587 mL/Hr) IV Continuous <Continuous>  fluconAZOLE IVPB 400 milliGRAM(s) IV Intermittent every 24 hours  heparin  Infusion 1000 Unit(s)/Hr (9 mL/Hr) IV Continuous <Continuous>  latanoprost 0.005% Ophthalmic Solution 1 Drop(s) Both EYES daily  meropenem  IVPB 500 milliGRAM(s) IV Intermittent every 24 hours  methylPREDNISolone sodium succinate Injectable 4 milliGRAM(s) IV Push daily  pantoprazole  Injectable 40 milliGRAM(s) IV Push daily  petrolatum Ophthalmic Ointment 1 Application(s) Both EYES daily  sodium chloride 0.9%. 1000 milliLiter(s) (50 mL/Hr) IV Continuous <Continuous>    MEDICATIONS  (PRN):  HYDROmorphone  Injectable 0.5 milliGRAM(s) IV Push every 3 hours PRN Breakthrough Pain  midazolam Injectable 2 milliGRAM(s) IV Push every 2 hours PRN agitation      OBJECTIVE:    Vital Signs Last 24 Hrs  T(C): 36.9 (15 [place] 2020 07:00), Max: 37 (15 [place] 2020 03:00)  T(F): 98.4 (15 [place] 2020 07:00), Max: 98.6 (15 [place] 2020 03:00)  HR: 71 (15 [place] 2020 09:00) (60 - 75)  BP: 87/55 (15 [place] 2020 01:20) (87/55 - 87/55)  BP(mean): 67 (15 [place] 2020 01:20) (67 - 67)  RR: 17 (15 [place] 2020 09:00) (15 - 23)  SpO2: 100% (15 [place] 2020 09:00) (98% - 100%)        I&O's Detail    2020 07:01  -  15 [place] 2020 07:00  --------------------------------------------------------  IN:    amiodarone Infusion: 183.7 mL    amiodarone Infusion: 217.1 mL    fentaNYL Infusion.: 33.6 mL    heparin Infusion: 216 mL    LORazepam  Infusion: 16 mL    Other: 600 mL    sodium chloride 0.9%.: 1200 mL    Solution: 300 mL  Total IN: 2766.4 mL    OUT:    Indwelling Catheter - Urethral: 50 mL    Nasoenteral Tube: 300 mL    Other: 600 mL    VAC (Vacuum Assisted Closure) System: 50 mL  Total OUT: 1000 mL    Total NET: 1766.4 mL      15 [place] 2020 07:01  -  15 [place] 2020 09:32  --------------------------------------------------------  IN:    amiodarone Infusion: 33.4 mL    heparin Infusion: 18 mL    sodium chloride 0.9%.: 100 mL  Total IN: 151.4 mL    OUT:  Total OUT: 0 mL    Total NET: 151.4 mL          Daily     Daily Weight in k.7 (15 [place] 2020 05:50)    LABS:                        7.7    16.76 )-----------( 378      ( 15 [place] 2020 08:20 )             23.8     01-15    137  |  100  |  22  ----------------------------<  85  3.8   |  25  |  4.91<H>    Ca    8.2<L>      15 [place] 2020 08:20  Phos  4.5     01-15  Mg     2.4     01-15    TPro  5.1<L>  /  Alb  2.6<L>  /  TBili  0.8  /  DBili  0.4<H>  /  AST  18  /  ALT  5<L>  /  AlkPhos  191<H>  15    PT/INR - ( 15 [place] 2020 08:20 )   PT: 13.7 sec;   INR: 1.19 ratio         PTT - ( 15 [place] 2020 08:20 )  PTT:61.0 sec              PHYSICAL EXAM:  Gen: Intubated, easily aroused with physical/verbal stimuli  HEENT: + ETT  Abd:  soft, RLQ with wound vac in place  LE: b/l pitting edema, R>L, RLE: dopplerable DP signal.  LLE: palpable DP pulse.

## 2020-01-15 NOTE — PROGRESS NOTE ADULT - PROBLEM SELECTOR PLAN 3
Arterial bleed but resolved after nephrectomy.  PRBC transfusion per 8ICU Arterial bleed but resolved after nephrectomy. Plan for 1 unit of PRBC transfusion

## 2020-01-15 NOTE — PROGRESS NOTE ADULT - ASSESSMENT
49y Male with ESRD s/p DDRT on 12/8/19 with course complicated by delayed graft function requiring HD, and infected perinephric hematoma s/p washout on 1/1/2020 with worsening sepsis and transfer to ICU, s/p IR drainage of perinephric collection on 1/6. 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 He underwent operative repair of right external iliac artery with bovine patch, transplant nephrectomy, RLE thrombectomy, and IVC filter on 1/10. Post-op course complicated by cardiac arrest secondary to saddle PE.    PLAN:    Neuro: intubated, acute post-op pain  - Fentanyl infusion for analgesic sedation w/ Dilaudid, Versed IV push Pro Re Nicol.     Resp: acute hypoxemic respiratory failure, saddle PE  - Mechanical ventilation at [facility] 450/14/5/30.  - ABG at 7.5/34/145/26.     CV: obstructive shock secondary to PE, bradycardia -> PEA arrest -> torsades  - Amiodarone infusion for ventricular tachycardia.  - Troponins downtrending.   - Daily EKGs while on amiodarone.   - Follow up official echo.     GI: no acute issues  - NPO while intubated for now  - Protonix for stress ulcer prophylaxis    Renal: CKD stage V s/p DDRT c/b DGF & perinephric collection, s/p explantation of DDRT due to pseudoaneurysm of right external iliac and transplanted renal artery  - Monitor I&Os  - Monitor electrolytes and replete as necessary  - NS at 50 mL/hr while NPO and on vasopressor support  - Appreciate nephrology recommendations, hemodialysis as per nephrology    Heme: right external iliac & femoral DVT s/p thrombectomy & IVC filter, saddle PE  - Monitor CBC and coags  - Heparin infusion for saddle PE  - Appreciate PERT, vascular cardiology, and CT surgery recommendations    ID: infected perinephric hematoma  - Monitor WBC, temperature, and procalcitonin  - Bladder hematoma & RP hematoma on 1/10 with carbapenem resistant Enterobacter cloacae  - Empiric antibiotics with fluconazole, ciprofloxacin, and meropenem    Endo: no acute issues  - methyprednisolone 4mg daily stress dose.     Misc:  - Brimonidine/latanoprost drops    Disposition:  - Full code  - Will remain in SICU

## 2020-01-15 NOTE — PROGRESS NOTE ADULT - SUBJECTIVE AND OBJECTIVE BOX
Vascular Cardiology  Progress note    SPECTRA [id number]              EMAIL [email or web address]   OFFICE [phone]    CC:  Pulmonary Embolism    INTERVAL HISTORY: No acute issues overnight. Tolerating heparin. Sedation is being weaned. SBT planned for today.      Allergies    coconut (Anaphylaxis)  morphine (Pruritus; Rash)    Intolerances    MEDICATIONS:  aMIOdarone Infusion 0.5 mG/Min IV Continuous <Continuous>  heparin  Infusion 1000 Unit(s)/Hr IV Continuous <Continuous>    ciprofloxacin   IVPB 400 milliGRAM(s) IV Intermittent every 24 hours  fluconAZOLE IVPB 400 milliGRAM(s) IV Intermittent every 24 hours  meropenem  IVPB 500 milliGRAM(s) IV Intermittent every 24 hours  fentaNYL   Infusion. 0.5 MICROgram(s)/kG/Hr IV Continuous <Continuous>  HYDROmorphone  Injectable 0.5 milliGRAM(s) IV Push every 3 hours PRN  midazolam Injectable 2 milliGRAM(s) IV Push every 2 hours PRN  pantoprazole  Injectable 40 milliGRAM(s) IV Push daily  methylPREDNISolone sodium succinate Injectable 4 milliGRAM(s) IV Push daily  brimonidine 0.2% Ophthalmic Solution 1 Drop(s) Both EYES three times a day  chlorhexidine 0.12% Liquid 15 milliLiter(s) Oral Mucosa <User Schedule>  chlorhexidine 2% Cloths 1 Application(s) Topical <User Schedule>  latanoprost 0.005% Ophthalmic Solution 1 Drop(s) Both EYES daily  petrolatum Ophthalmic Ointment 1 Application(s) Both EYES daily  sodium chloride 0.9%. 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  Carpal tunnel syndrome  Brain cyst: had MRI recently- now gone  Anemia  Gastric ulcer: Long time ago  Rheumatoid arthritis  Renal transplant recipient  Breakdown (mechanical) of penile (implanted) prosthesis, sequela  End-stage renal disease (ESRD): PERMACATH PLACEMENT  Abscess of left buttock: s/p I &amp; D  AV fistula: placement left lower arm  S/P cystoscopy: stent placement &amp; removal for kidney stones, lithiotripsy  s/p Lt Carpal tunnel: 2011      FAMILY HISTORY:  Family history of myocardial infarction in first degree male relative  Family history of rheumatoid arthritis  Family history of hypertension in mother  Family history of cerebral aneurysm (Sibling)      SOCIAL HISTORY:  unchanged    REVIEW OF SYSTEMS:  CONSTITUTIONAL: No fever, weight loss, or fatigue  EYES: No eye pain, visual disturbances, or discharge  ENMT:  No difficulty hearing, tinnitus, vertigo; No sinus or throat pain  NECK: No pain or stiffness  RESPIRATORY: No cough, wheezing, chills or hemoptysis; No Shortness of Breath  CARDIOVASCULAR: No chest pain, palpitations, passing out, dizziness, or leg swelling  GASTROINTESTINAL: No abdominal or epigastric pain. No nausea, vomiting, or hematemesis; No diarrhea or constipation. No melena or hematochezia.  GENITOURINARY: No dysuria, frequency, hematuria, or incontinence  NEUROLOGICAL: No headaches, memory loss, loss of strength, numbness, or tremors  SKIN: No itching, burning, rashes, or lesions   LYMPH Nodes: No enlarged glands  ENDOCRINE: No heat or cold intolerance; No hair loss  MUSCULOSKELETAL: No joint pain or swelling; No muscle, back, or extremity pain  PSYCHIATRIC: No depression, anxiety, mood swings, or difficulty sleeping  HEME/LYMPH: No easy bruising, or bleeding gums  ALLERY AND IMMUNOLOGIC: No hives or eczema	    [ ] All others negative	  [x ] Unable to obtain    PHYSICAL EXAM:  T(C): 36.9 (01-15-20 @ 07:00), Max: 37 (01-15-20 @ 03:00)  HR: 71 (01-15-20 @ 09:00) (60 - 75)  BP: 87/55 (01-15-20 @ 01:20) (87/55 - 87/55)  RR: 17 (01-15-20 @ 09:00) (15 - 23)  SpO2: 100% (01-15-20 @ 09:00) (98% - 100%)  Wt(kg): --  I&O's Summary    14 Jan 2020 07:01  -  15 [place] 2020 07:00  --------------------------------------------------------  IN: 2766.4 mL / OUT: 1000 mL / NET: 1766.4 mL    15 [place] 2020 07:01  -  15 [place] 2020 09:21  --------------------------------------------------------  IN: 151.4 mL / OUT: 0 mL / NET: 151.4 mL        Appearance: Normal	  HEENT:   Normal oral mucosa, PERRL, EOMI	  Carotid:  Right: No bruit    Left:  No bruit  Lymphatic: No lymphadenopathy  Cardiovascular: Normal S1 S2, No JVD, No murmurs + b/l LE edema up to thigh  Respiratory: Lungs clear to auscultation	  Psychiatry: Intubated but responding to questions with nods  Gastrointestinal:  Soft, Non-tender, + BS	  Skin: No rashes, No ecchymoses, No cyanosis	  Neurologic: Non-focal  Extremities: Normal range of motion, No clubbing, cyanosis.  Vascular:   Right Femoral:  Palpable     Left Femoral:  Palpable  Right Popliteal: Palpable     Left Popliteal:  palpable  Right DP:  Palpable             Left DP:  Palpable  Right PT: + doppler            Left PT:  + doppler       LABS:	 	    CBC Full  -  ( 15 [place] 2020 08:20 )  WBC Count : 16.76 K/uL  Hemoglobin : 7.7 g/dL  Hematocrit : 23.8 %  Platelet Count - Automated : 378 K/uL  Mean Cell Volume : 93.0 fl  Mean Cell Hemoglobin : 30.1 pg  Mean Cell Hemoglobin Concentration : 32.4 gm/dL  Auto Neutrophil # : x  Auto Lymphocyte # : x  Auto Monocyte # : x  Auto Eosinophil # : x  Auto Basophil # : x  Auto Neutrophil % : x  Auto Lymphocyte % : x  Auto Monocyte % : x  Auto Eosinophil % : x  Auto Basophil % : x    01-15    137  |  100  |  22  ----------------------------<  85  3.8   |  25  |  4.91<H>  01-15    136  |  97  |  22  ----------------------------<  87  3.9   |  24  |  4.64<H>    Ca    8.2<L>      15 Jan 2020 08:20  Ca    8.0<L>      15 Jan 2020 00:18  Phos  4.5     01-15  Phos  4.3     01-15  Mg     2.4     01-15  Mg     2.3     01-15    TPro  5.1<L>  /  Alb  2.6<L>  /  TBili  0.8  /  DBili  0.4<H>  /  AST  18  /  ALT  5<L>  /  AlkPhos  191<H>  01-15  TPro  4.9<L>  /  Alb  2.5<L>  /  TBili  0.8  /  DBili  0.4<H>  /  AST  22  /  ALT  6<L>  /  AlkPhos  194<H>  01-15

## 2020-01-15 NOTE — PROGRESS NOTE ADULT - SUBJECTIVE AND OBJECTIVE BOX
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 Pt. overall with complicated hospital course, including persistent hematuria, RLE DVT, and DGF. Had transplant nephrectomy along with endovascular repair and IVC filter placement on 1/10/20. In the past 24 hours, pt. had multiple cardiac events 2/2 saddle PE, despite IVC filter placement and heparin gtt. Pt. is currently intubated and on  amiodarone gtt. Pt. remained with stable FiO2 requirement overnight (30%).     Pt. seen and examined at bedside this AM in SICU. Pt. tolerating HD treatment yesterday (2 hours, no UF). No longer on IV vasopressor support. Pt. is responsive to verbal/physical stimuli, however remains intubated. Unable to complete ROS.    PAST HISTORY  --------------------------------------------------------------------------------  No significant changes to PMH, PSH, FHx, SHx, unless otherwise noted    ALLERGIES & MEDICATIONS  --------------------------------------------------------------------------------  Allergies    coconut (Anaphylaxis)  morphine (Pruritus; Rash)    Intolerances    Standing Inpatient Medications  aMIOdarone Infusion 0.5 mG/Min IV Continuous <Continuous>  brimonidine 0.2% Ophthalmic Solution 1 Drop(s) Both EYES three times a day  chlorhexidine 0.12% Liquid 15 milliLiter(s) Oral Mucosa <User Schedule>  chlorhexidine 2% Cloths 1 Application(s) Topical <User Schedule>  ciprofloxacin   IVPB 400 milliGRAM(s) IV Intermittent every 24 hours  fentaNYL   Infusion. 0.5 MICROgram(s)/kG/Hr IV Continuous <Continuous>  fluconAZOLE IVPB 400 milliGRAM(s) IV Intermittent every 24 hours  heparin  Infusion 1000 Unit(s)/Hr IV Continuous <Continuous>  latanoprost 0.005% Ophthalmic Solution 1 Drop(s) Both EYES daily  meropenem  IVPB 500 milliGRAM(s) IV Intermittent every 24 hours  methylPREDNISolone sodium succinate Injectable 4 milliGRAM(s) IV Push daily  pantoprazole  Injectable 40 milliGRAM(s) IV Push daily  petrolatum Ophthalmic Ointment 1 Application(s) Both EYES daily  sodium chloride 0.9%. 1000 milliLiter(s) IV Continuous <Continuous>    REVIEW OF SYSTEMS  --------------------------------------------------------------------------------  Unable to complete.    VITALS/PHYSICAL EXAM  --------------------------------------------------------------------------------  T(C): 37 (01-15-20 @ 03:00), Max: 37 (01-15-20 @ 03:00)  HR: 75 (01-15-20 @ 05:00) (60 - 75)  BP: 87/55 (01-15-20 @ 01:20) (87/55 - 87/55)  RR: 18 (01-15-20 @ 05:00) (15 - 23)  SpO2: 100% (01-15-20 @ 05:00) (98% - 100%)  Wt(kg): --    01-13-20 @ 07:01  -  01-14-20 @ 07:00  --------------------------------------------------------  IN: 1985.6 mL / OUT: 350 mL / NET: 1635.6 mL    01-14-20 @ 07:01  -  01-15-20 @ 06:38  --------------------------------------------------------  IN: 2415 mL / OUT: 1000 mL / NET: 1415 mL    Physical Exam:  	Gen: Intubated, easily aroused with physical/verbal stimuli  	HEENT: + ETT  	Pulm: + mechanical breath sounds   	CV: RRR, S1S2; no rub  	Abd: +BS, soft, RLQ with wound vac in place                       Transplant: No tenderness, swelling  	: No suprapubic tenderness  	LE: b/l pitting edema, R>L    LABS/STUDIES  --------------------------------------------------------------------------------              7.5    13.93 >-----------<  365      [01-15-20 @ 00:18]              22.7     136  |  97  |  22  ----------------------------<  87      [01-15-20 @ 00:18]  3.9   |  24  |  4.64        Ca     8.0     [01-15-20 @ 00:18]      Mg     2.3     [01-15-20 @ 00:18]      Phos  4.3     [01-15-20 @ 00:18]    Creatinine Trend:  SCr 4.64 [01-15 @ 00:18]  SCr 4.12 [01-14 @ 18:39]  SCr 3.11 [01-14 @ 14:46]  SCr 6.18 [01-14 @ 10:30]  SCr 5.95 [01-14 @ 05:24]

## 2020-01-15 NOTE — PROGRESS NOTE ADULT - ASSESSMENT
imp/rx:  The main ID issue is the infection at surgical site for which source control was obtained and the pathogen is sensitive to meropenem.  I think that the cipro can be dc'd now.  I think that the diflucan can be dc'd as well--being used for prophylaxis--no yeast identified.

## 2020-01-15 NOTE — PROGRESS NOTE ADULT - SUBJECTIVE AND OBJECTIVE BOX
Patient  is  seen  VS stable  WBC  is  [id number]  CT shows  RP collection  Has  enterobacter in the  Cultures from RP hematoma  On Heparin  Strongly recommend  IR  drainage  of the  fluid  collction  for  the  fear of  erosion into the  arterial  repair  Meripenam  sensitive  enterobacter

## 2020-01-15 NOTE — PROGRESS NOTE ADULT - SUBJECTIVE AND OBJECTIVE BOX
Transplant Surgery - Multidisciplinary Rounds  --------------------------------------------------------------  R DDRT    Date:  12/8/19  Dr. Espinal, readmitted 12/27/19 with +Flu/perinephretic collection  Ex Lap washout/ renal bx  Date:  1/1/20    Interval events:    - No AngioJet planned after discussion with vascular cardiology   - weaned off sedation this morning   - on heparin gtt for anticoagulation and amiodarone gtt   - Dialyzed yesterday and was given albumin     Present:   Patient seen with multidisciplinary team including Transplant Surgeon: Dr. Velásquez, Dr. Poe, Dr. Cruz, Dr. Joy, Dr. Espinal, Transplant Nephrologist: Dr. Lowery, Dr. Irvin, renal fellow, Pharmacist: Debbi Ortiz, PGY 3 Dr. Bess, NA/PA: Stefan Hunt, PA student, MED student, and pharmacy studentduring am rounds and examined with Dr. Cruz.  Disciplines not in attendance will be notified of the plan.     HPI: 49M PMHx of ESRD on HD  ( via LUE AVF), HTN, Gout, Glaucoma, NET of pancreas (s/p robotic distal panc/splenectomy at [place] 2015 by Dr. Young, followed by Dr. Raphael, [facility] Oncologist), RA with hand contractures. He underwent DDRT on 12/8/19 (ureteral stent sutured to Martinez - removed 12/15). Post op course c/b DGF requiring HD, thrombocytopenia, elevated LDH and Haptoglobulin. Schistocytes  on peripheral smear concerning for TMA. Envarsus held. Received Belatacept 12/13. Started on PLEX (12/12, 12/15). Underwent renal bx on 12/13 c/w profound ATN.  Found to have much improvement to levels, likely related to Tacrolimus. He was discharged home on 12/20/19 w/ outpatient HD.     Re-admitted 12/27 with influenza, completed treatement with Tamiflu on 12/31. Urine cx on admission grew Ent cloacae, was started on meropenem 12/31. Renal US on admission with increasing hematoma. OR on 1/1 for ex-lap, hematoma evacuation, washout and renal bx. (c/w ATN)  OR cxs grew CRE and e coli. Post op course c/b:   ·	fevers and leukocytosis, CT a/p (1/4) with increased perinephric collection. Underwent IR guided collection 1/6 (fluid cx neg)  ·	New onset hematuria, martinez inserted 1/7, started CBI  ·	RLE edema, RLE doppler (1/8) with acute above the knee thrombus of R external iliac, common femoral and femoral veins, Acute calf vein thrombus affecting R soleal vein, Heparin gtt initiated   ·	AMS 1/9  (head CT neg), hyponatremic  ·	Significant hematuria and bleeding around the martinez. Angio 1/9 showed actively bleeding pseudoaneurysm at the anastomosis of the transplant renal artery with the recipient iliac artery. A stent was placed in the iliac artery to control hemorrhage. IVC filter placed.   ·	Emergent OR 1/9-1/10 for graft nephrectomy;  ureter to bladder anastomosis was completely disrupted, repaired the R external iliac artery with bovine pericardial patch, repaired right external iliac vein, performed thrombectomy of RLE veins, Intra op received 3u PRBC.       cxs:   12/29: Urine Cx: Enterobacter Cloacae  1/1 OR Perinephric collection Cx:  Carbapenem resistant Ent Cloacae, E. Coli  1/1: R kidney abscess swab: Enterobacter Cloacae  1/1  OR tissue: Enterobacter Cloacae  1/5: Urine Cx: Enteropbacter Cloacae  1/5: Skin incision Cx (bedside): NG  1/6: Adominal fluid Cx from IR:  NG   1/7: Urine Cx:  NG  1/7: BCX2:  NG  1/10 retroperit hematoma - enerobacter (pending)  1/10 bladder hematoma - enterobacter (pending)    Potential Discharge date: pending clinical improvement     MEDICATIONS  (STANDING):  aMIOdarone Infusion 1 mG/Min (33.333 mL/Hr) IV Continuous <Continuous>  aMIOdarone Infusion 0.5 mG/Min (16.667 mL/Hr) IV Continuous <Continuous>  brimonidine 0.2% Ophthalmic Solution 1 Drop(s) Both EYES three times a day  chlorhexidine 0.12% Liquid 15 milliLiter(s) Oral Mucosa <User Schedule>  chlorhexidine 2% Cloths 1 Application(s) Topical <User Schedule>  ciprofloxacin   IVPB 400 milliGRAM(s) IV Intermittent every 24 hours  fentaNYL   Infusion. 0.5 MICROgram(s)/kG/Hr (1.587 mL/Hr) IV Continuous <Continuous>  fluconAZOLE IVPB 400 milliGRAM(s) IV Intermittent every 24 hours  heparin  Infusion 1000 Unit(s)/Hr (9 mL/Hr) IV Continuous <Continuous>  latanoprost 0.005% Ophthalmic Solution 1 Drop(s) Both EYES daily  LORazepam Infusion 0.031 mG/kG/Hr (2 mL/Hr) IV Continuous <Continuous>  meropenem  IVPB 500 milliGRAM(s) IV Intermittent every 24 hours  methylPREDNISolone sodium succinate Injectable 4 milliGRAM(s) IV Push daily  pantoprazole  Injectable 40 milliGRAM(s) IV Push daily  petrolatum Ophthalmic Ointment 1 Application(s) Both EYES daily  sodium chloride 0.9%. 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  Carpal tunnel syndrome  Brain cyst: had MRI recently- now gone  Anemia  Gastric ulcer: Long time ago  Rheumatoid arthritis  Renal transplant recipient  Breakdown (mechanical) of penile (implanted) prosthesis, sequela  End-stage renal disease (ESRD): PERMACATH PLACEMENT  Abscess of left buttock: s/p I &amp; D  AV fistula: placement left lower arm  S/P cystoscopy: stent placement &amp; removal for kidney stones, lithiotripsy  s/p Lt Carpal tunnel: 2011    Vital Signs Last 24 Hrs  T(C): 36.3 (14 Jan 2020 07:00), Max: 37 (13 Jan 2020 15:00)  T(F): 97.3 (14 Jan 2020 07:00), Max: 98.6 (13 Jan 2020 15:00)  HR: 66 (14 Jan 2020 09:45) (54 - 108)  BP: --  BP(mean): --  RR: 21 (14 Jan 2020 09:45) (8 - 84)  SpO2: 100% (14 Jan 2020 09:45) (88% - 100%)    I&O's Summary    13 Jan 2020 07:01  -  14 Jan 2020 07:00  --------------------------------------------------------  IN: 1985.6 mL / OUT: 350 mL / NET: 1635.6 mL    14 Jan 2020 07:01  -  14 Jan 2020 10:47  --------------------------------------------------------  IN: 158.6 mL / OUT: 0 mL / NET: 158.6 mL    CBC (01-14 @ 10:30)                          8.3<L>                   14.09<H>  )--------------(  365        --    % Neuts, --    % Lymphs, ANC: --                              24.5<L>  CBC (01-14 @ 05:24)                          8.2<L>                   15.59<H>  )--------------(  348        --    % Neuts, --    % Lymphs, ANC: --                              24.4<L>    BMP (01-14 @ 05:24)       136     |  98      |  34<H> 			Ca++ --      Ca 8.7          ---------------------------------( 91    		Mg 2.8<H>       4.5     |  25      |  5.95<H>			Ph 6.3<H>  BMP (01-14 @ 01:12)       132<L>  |  94<L>   |  31<H> 			Ca++ --      Ca 8.1<L>       ---------------------------------( 143<H>		Mg 2.9<H>       4.4     |  22      |  5.82<H>			Ph 6.7<H>    LFTs (01-14 @ 05:24)      TPro 4.9<L> / Alb 2.6<L> / TBili 1.3<H> / DBili 0.8<H> / AST 49<H> / ALT 10 / AlkPhos 237<H>  LFTs (01-14 @ 01:12)      TPro 5.1<L> / Alb 2.6<L> / TBili 1.6<H> / DBili 1.0<H> / AST 56<H> / ALT 10 / AlkPhos 271<H>    Coags (01-14 @ 05:24)  aPTT 93.8<H> / INR 1.17<H> / PT 13.4<H>  Coags (01-14 @ 01:12)  aPTT 105.0<H> / INR 1.14 / PT 13.0<H>    Cardiac Markers (01-13 @ 21:40)     Trop: -- -- / CKMB: -- / CK: 117    ABG (01-14 @ 05:50)     7.44 / 39 / 141<H> / 26 / 2.2<H> / 99<H>%     Lactate:    ABG (01-14 @ 01:00)     7.41 / 42 / 212<H> / 26 / 1.6 / 100<H>%     Lactate:          -> .Tissue Other Culture (01-10 @ 06:07)       No polymorphonuclear cells seen per low power field  No organisms seen per oil power field    Enterobacter cloacae (Carbapenem Resistant)    No growth    -> .Blood Blood-Venous Culture (01-07 @ 13:23)     NG    NG    No growth at 5 days.    -> .Urine Catheterized Culture (01-07 @ 00:18)     NG    NG    <10,000 CFU/mL Normal Urogenital Gillian    -> Abdominal Fl Abdominal Fluid Culture (01-06 @ 21:03)       No polymorphonuclear leukocytes seen  No organisms seen  by cytocentrifuge    NG    No growth at 5 days    -> .Urine Clean Catch (Midstream) Culture (01-05 @ 13:59)     NG    Enterobacter cloacae    >100,000 CFU/ml Enterobacter cloacae    -> .Blood Blood Culture (01-04 @ 13:07)     NG    NG    No growth at 5 days.    REVIEW OF SYSTEMS  RoS limited by patient's status of being sedated and on ventilator     PHYSICAL EXAM:    Constitutional: sedated  	Eyes: Anicteric, PERRLA  	Respiratory: on mechanical ventilator: 450, 20, 5 and 30% with good gas exchange   	Cardiovascular: RRR  	Gastrointestinal: Soft abdomen, appropriate incisional TTP, ND.  wound vac ss/murky output    Genitourinary:  Martinez, anuric, residual hematuria in bag    Extremities: RLE edema   	Vascular: doppler signal R DP and PT, faintly palpable L DP.  3+ pitting edema RLE, 1+LLE . L AVF palpable  	Neurological: A&O x3. no confusion noted on exam  	Skin: no new lesions/ulcerations  	Musculoskeletal: Moving all extremities    Psychiatric: sedated, RASS - 1 Transplant Surgery - Multidisciplinary Rounds  --------------------------------------------------------------  R DDRT    Date:  12/8/19  Dr. Espinal, readmitted 12/27/19 with +Flu/perinephretic collection  Ex Lap washout/ renal bx  Date:  1/1/20    Interval events:    - No AngioJet planned after discussion with vascular cardiology   - weaned off sedation this morning   - on heparin gtt for anticoagulation and amiodarone gtt   - Dialyzed yesterday and was given albumin     Present:   Patient seen with multidisciplinary team including Transplant Surgeon: Dr. Velásquez, Dr. Poe, Dr. Cruz, Dr. Joy, Dr. Espinal, Transplant Nephrologist: Dr. Lowery, Dr. Irvin, renal fellow, Pharmacist: Debbi Ortiz, PGY 3 Dr. Bess, NA/PA: Stefan Hunt, PA student, MED student, and pharmacy studentduring am rounds and examined with Dr. Cruz.  Disciplines not in attendance will be notified of the plan.     HPI: 49M PMHx of ESRD on HD  ( via LUE AVF), HTN, Gout, Glaucoma, NET of pancreas (s/p robotic distal panc/splenectomy at [place] 2015 by Dr. Young, followed by Dr. Raphael, [facility] Oncologist), RA with hand contractures. He underwent DDRT on 12/8/19 (ureteral stent sutured to Martinez - removed 12/15). Post op course c/b DGF requiring HD, thrombocytopenia, elevated LDH and Haptoglobulin. Schistocytes  on peripheral smear concerning for TMA. Envarsus held. Received Belatacept 12/13. Started on PLEX (12/12, 12/15). Underwent renal bx on 12/13 c/w profound ATN.  Found to have much improvement to levels, likely related to Tacrolimus. He was discharged home on 12/20/19 w/ outpatient HD.     Re-admitted 12/27 with influenza, completed treatement with Tamiflu on 12/31. Urine cx on admission grew Ent cloacae, was started on meropenem 12/31. Renal US on admission with increasing hematoma. OR on 1/1 for ex-lap, hematoma evacuation, washout and renal bx. (c/w ATN)  OR cxs grew CRE and e coli. Post op course c/b:   ·	fevers and leukocytosis, CT a/p (1/4) with increased perinephric collection. Underwent IR guided collection 1/6 (fluid cx neg)  ·	New onset hematuria, martinez inserted 1/7, started CBI  ·	RLE edema, RLE doppler (1/8) with acute above the knee thrombus of R external iliac, common femoral and femoral veins, Acute calf vein thrombus affecting R soleal vein, Heparin gtt initiated   ·	AMS 1/9  (head CT neg), hyponatremic  ·	Significant hematuria and bleeding around the martinez. Angio 1/9 showed actively bleeding pseudoaneurysm at the anastomosis of the transplant renal artery with the recipient iliac artery. A stent was placed in the iliac artery to control hemorrhage. IVC filter placed.   ·	Emergent OR 1/9-1/10 for graft nephrectomy;  ureter to bladder anastomosis was completely disrupted, repaired the R external iliac artery with bovine pericardial patch, repaired right external iliac vein, performed thrombectomy of RLE veins, Intra op received 3u PRBC.       cxs:   12/29: Urine Cx: Enterobacter Cloacae  1/1 OR Perinephric collection Cx:  Carbapenem resistant Ent Cloacae, E. Coli  1/1: R kidney abscess swab: Enterobacter Cloacae  1/1  OR tissue: Enterobacter Cloacae  1/5: Urine Cx: Enteropbacter Cloacae  1/5: Skin incision Cx (bedside): NG  1/6: Adominal fluid Cx from IR:  NG   1/7: Urine Cx:  NG  1/7: BCX2:  NG  1/10 retroperit hematoma - enerobacter (pending)  1/10 bladder hematoma - enterobacter (pending)    Potential Discharge date: pending clinical improvement    MEDICATIONS  (STANDING):  aMIOdarone Infusion 0.5 mG/Min (16.667 mL/Hr) IV Continuous <Continuous>  brimonidine 0.2% Ophthalmic Solution 1 Drop(s) Both EYES three times a day  chlorhexidine 0.12% Liquid 15 milliLiter(s) Oral Mucosa <User Schedule>  chlorhexidine 2% Cloths 1 Application(s) Topical <User Schedule>  ciprofloxacin   IVPB 400 milliGRAM(s) IV Intermittent every 24 hours  fentaNYL   Infusion. 0.5 MICROgram(s)/kG/Hr (1.587 mL/Hr) IV Continuous <Continuous>  fluconAZOLE IVPB 400 milliGRAM(s) IV Intermittent every 24 hours  heparin  Infusion 1000 Unit(s)/Hr (9 mL/Hr) IV Continuous <Continuous>  latanoprost 0.005% Ophthalmic Solution 1 Drop(s) Both EYES daily  meropenem  IVPB 500 milliGRAM(s) IV Intermittent every 24 hours  methylPREDNISolone sodium succinate Injectable 4 milliGRAM(s) IV Push daily  pantoprazole  Injectable 40 milliGRAM(s) IV Push daily  petrolatum Ophthalmic Ointment 1 Application(s) Both EYES daily  sodium chloride 0.9%. 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  Carpal tunnel syndrome  Brain cyst: had MRI recently- now gone  Anemia  Gastric ulcer: Long time ago  Rheumatoid arthritis  Renal transplant recipient  Breakdown (mechanical) of penile (implanted) prosthesis, sequela  End-stage renal disease (ESRD): PERMACATH PLACEMENT  Abscess of left buttock: s/p I &amp; D  AV fistula: placement left lower arm  S/P cystoscopy: stent placement &amp; removal for kidney stones, lithiotripsy  s/p Lt Carpal tunnel: 2011    Vital Signs Last 24 Hrs  T(C): 37 (15 [place] 2020 11:00), Max: 37 (15 [place] 2020 03:00)  T(F): 98.6 (15 [place] 2020 11:00), Max: 98.6 (15 [place] 2020 03:00)  HR: 71 (15 [place] 2020 12:00) (60 - 75)  BP: 87/55 (15 [place] 2020 01:20) (87/55 - 87/55)  BP(mean): 67 (15 [place] 2020 01:20) (67 - 67)  RR: 17 (15 [place] 2020 12:00) (15 - 23)  SpO2: 100% (15 [place] 2020 12:00) (98% - 100%)    I&O's Summary  14 Jan 2020 07:01  -  15 [place] 2020 07:00  --------------------------------------------------------  IN: 2766.4 mL / OUT: 1000 mL / NET: 1766.4 mL    15 [place] 2020 07:01  -  15 [place] 2020 12:24  --------------------------------------------------------  IN: 378.5 mL / OUT: 0 mL / NET: 378.5 mL    CBC (01-15 @ 08:20)                          7.7<L>                   16.76<H>  )--------------(  378        --    % Neuts, --    % Lymphs, ANC: --                              23.8<L>  CBC (01-15 @ 00:18)                          7.5<L>                   13.93<H>  )--------------(  365        --    % Neuts, --    % Lymphs, ANC: --                              22.7<L>    BMP (01-15 @ 08:20)       137     |  100     |  22    			Ca++ --      Ca 8.2<L>       ---------------------------------( 85    		Mg 2.4          3.8     |  25      |  4.91<H>			Ph 4.5     BMP (01-15 @ 00:18)       136     |  97      |  22    			Ca++ --      Ca 8.0<L>       ---------------------------------( 87    		Mg 2.3          3.9     |  24      |  4.64<H>			Ph 4.3       LFTs (01-15 @ 08:20)      TPro 5.1<L> / Alb 2.6<L> / TBili 0.8 / DBili 0.4<H> / AST 18 / ALT 5<L> / AlkPhos 191<H>  LFTs (01-15 @ 00:18)      TPro 4.9<L> / Alb 2.5<L> / TBili 0.8 / DBili 0.4<H> / AST 22 / ALT 6<L> / AlkPhos 194<H>    Coags (01-15 @ 08:20)  aPTT 61.0<H> / INR 1.19<H> / PT 13.7<H>  Coags (01-15 @ 00:18)  aPTT 68.6<H> / INR 1.18<H> / PT 13.6<H>      ABG (01-15 @ 09:59)     7.44 / 38 / 141<H> / 26 / 1.9 / 99<H>%     Lactate:    ABG (01-15 @ 08:06)     7.42 / 39 / 130<H> / 25 / .9 / 99<H>%     Lactate:          -> .Tissue Other Culture (01-10 @ 06:07)       No polymorphonuclear cells seen per low power field  No organisms seen per oil power field    Enterobacter cloacae (Carbapenem Resistant)    No growth at 5 days    -> .Blood Blood-Venous Culture (01-07 @ 13:23)     NG    NG    No growth at 5 days.    -> .Urine Catheterized Culture (01-07 @ 00:18)     NG    NG    <10,000 CFU/mL Normal Urogenital Gillian    -> Abdominal Fl Abdominal Fluid Culture (01-06 @ 21:03)       No polymorphonuclear leukocytes seen  No organisms seen  by cytocentrifuge    NG    No growth at 5 days    -> .Urine Clean Catch (Midstream) Culture (01-05 @ 13:59)     NG    Enterobacter cloacae    >100,000 CFU/ml Enterobacter cloacae        REVIEW OF SYSTEMS  RoS limited by patient's status of being sedated and on ventilator     PHYSICAL EXAM:    Constitutional: sedated  	Eyes: Anicteric, PERRLA  	Respiratory: on mechanical ventilator: 450, 14, 5 and 30% with good gas exchange   	Cardiovascular: RRR  	Gastrointestinal: Soft abdomen, appropriate incisional TTP, ND.  wound vac ss/murky output    Genitourinary:  Martinez, anuric, residual hematuria in bag    Extremities: RLE edema   	Vascular: strongly palpable R DP, faintly palpable L DP.  2+ pitting edema RLE, 1+LLE . L AVF palpable  	Neurological: A&O x3. no confusion noted on exam  	Skin: no new lesions/ulcerations  	Musculoskeletal: Moving all extremities    Psychiatric: sedated, RASS - 1

## 2020-01-15 NOTE — PROGRESS NOTE ADULT - ASSESSMENT
Assessment:  1. PE - likely provoked massive bilaterally PE in the setting of recent acute above-the-knee thrombus affecting the right external iliac, common femoral, femoral veins and right soleal vein s/p thrombectomy and filter placement. Overall appears to be hemodynamically stable. I reviewed Echo this AM and RV function is stable and PA pressures are stable/improved. He is on minimal oxygen requirements and off pressors.  2. Pseudoaneurysm of right external iliac artery / transplant renal artery anastomosis - s/p operative repair of right external iliac artery with pericardial patch and transplant nephrectomy  3. Cardiac arrest   3. Perinephric hematoma complicated by infection     Plan:  1. Continue heparin gtt for now. No thrombolytic/thrombectomy therapy indicated at this time. No objections to an attempt at extubation.   2. Eventual oral AC as patient displays stability.   3. Arrythmia management per general cardiology.     SICU team.    Thank you,  Oj Moreau MD  Vascular Cardiology Service  [facility] [address]  Office [phone]  email:   [email or web address]

## 2020-01-15 NOTE — PROGRESS NOTE ADULT - SUBJECTIVE AND OBJECTIVE BOX
[facility] -- Melissa Kelsey, Maximino Nunez Pannella, Patel, Savella  Office # [phone]      Follow Up:  resp failure, PAF, VF arrest    Subjective/Observations: Patient seen and examined. Events noted. Unable to provide meaningful information. The patient is intubated and requiring mechanical ventilatory support.       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  Carpal tunnel syndrome  Brain cyst: had MRI recently- now gone  Anemia  Gastric ulcer: Long time ago  Rheumatoid arthritis  Renal transplant recipient  Breakdown (mechanical) of penile (implanted) prosthesis, sequela  End-stage renal disease (ESRD): PERMACATH PLACEMENT  Abscess of left buttock: s/p I &amp; D  AV fistula: placement left lower arm  S/P cystoscopy: stent placement &amp; removal for kidney stones, lithiotripsy  s/p Lt Carpal tunnel: 2011      MEDICATIONS  (STANDING):  aMIOdarone Infusion 0.5 mG/Min (16.667 mL/Hr) IV Continuous <Continuous>  brimonidine 0.2% Ophthalmic Solution 1 Drop(s) Both EYES three times a day  chlorhexidine 0.12% Liquid 15 milliLiter(s) Oral Mucosa <User Schedule>  chlorhexidine 2% Cloths 1 Application(s) Topical <User Schedule>  ciprofloxacin   IVPB 400 milliGRAM(s) IV Intermittent every 24 hours  fentaNYL   Infusion. 0.5 MICROgram(s)/kG/Hr (1.587 mL/Hr) IV Continuous <Continuous>  fluconAZOLE IVPB 400 milliGRAM(s) IV Intermittent every 24 hours  heparin  Infusion 1000 Unit(s)/Hr (9 mL/Hr) IV Continuous <Continuous>  latanoprost 0.005% Ophthalmic Solution 1 Drop(s) Both EYES daily  meropenem  IVPB 500 milliGRAM(s) IV Intermittent every 24 hours  methylPREDNISolone sodium succinate Injectable 4 milliGRAM(s) IV Push daily  pantoprazole  Injectable 40 milliGRAM(s) IV Push daily  petrolatum Ophthalmic Ointment 1 Application(s) Both EYES daily  sodium chloride 0.9%. 1000 milliLiter(s) (50 mL/Hr) IV Continuous <Continuous>    MEDICATIONS  (PRN):  HYDROmorphone  Injectable 0.5 milliGRAM(s) IV Push every 3 hours PRN Breakthrough Pain  midazolam Injectable 2 milliGRAM(s) IV Push every 2 hours PRN agitation      Allergies    coconut (Anaphylaxis)  morphine (Pruritus; Rash)    Intolerances            Vital Signs Last 24 Hrs  T(C): 36.9 (15 [place] 2020 07:00), Max: 37 (15 [place] 2020 03:00)  T(F): 98.4 (15 [place] 2020 07:00), Max: 98.6 (15 [place] 2020 03:00)  HR: 74 (15 [place] 2020 10:00) (60 - 75)  BP: 87/55 (15 [place] 2020 01:20) (87/55 - 87/55)  BP(mean): 67 (15 [place] 2020 01:20) (67 - 67)  RR: 18 (15 [place] 2020 10:00) (15 - 23)  SpO2: 99% (15 [place] 2020 10:00) (98% - 100%)    I&O's Summary    14 Jan 2020 07:01  -  15 [place] 2020 07:00  --------------------------------------------------------  IN: 2766.4 mL / OUT: 1000 mL / NET: 1766.4 mL    15 [place] 2020 07:01  -  15 [place] 2020 10:18  --------------------------------------------------------  IN: 227.1 mL / OUT: 0 mL / NET: 227.1 mL          PHYSICAL EXAM:  TELE: SR  Constitutional: NAD,   HEENT: +ETT in place  Pulmonary: Decreased breath sounds b/l. No rales, crackles or wheeze appreciated.   Cardiovascular: Regular, S1 and S2, No murmurs, rubs, gallops or clicks  Gastrointestinal: Bowel Sounds present, soft, nontender.   Lymph: No peripheral edema. No lymphadenopathy.  Skin: No visible rashes or ulcers.  Psych:  Mood & affect appropriate for situation    LABS: All Labs Reviewed:                        7.7    16.76 )-----------( 378      ( 15 [place] 2020 08:20 )             23.8                         7.5    13.93 )-----------( 365      ( 15 [place] 2020 00:18 )             22.7                         7.8    13.59 )-----------( 359      ( 14 Jan 2020 18:39 )             24.0     15 [place] 2020 08:20    137    |  100    |  22     ----------------------------<  85     3.8     |  25     |  4.91   15 [place] 2020 00:18    136    |  97     |  22     ----------------------------<  87     3.9     |  24     |  4.64   14 Jan 2020 18:39    137    |  97     |  18     ----------------------------<  92     4.0     |  23     |  4.12     Ca    8.2        15 [place] 2020 08:20  Ca    8.0        15 Jan 2020 00:18  Ca    7.8        14 Jan 2020 18:39  Phos  4.5       15 Jan 2020 08:20  Phos  4.3       15 Jan 2020 00:18  Phos  4.2       14 Jan 2020 18:39  Mg     2.4       15 Jan 2020 08:20  Mg     2.3       15 Jan 2020 00:18  Mg     2.3       14 Jan 2020 18:39    TPro  5.1    /  Alb  2.6    /  TBili  0.8    /  DBili  0.4    /  AST  18     /  ALT  5      /  AlkPhos  191    15 Jan 2020 08:20  TPro  4.9    /  Alb  2.5    /  TBili  0.8    /  DBili  0.4    /  AST  22     /  ALT  6      /  AlkPhos  194    15 Jan 2020 00:18  TPro  4.7    /  Alb  2.4    /  TBili  0.8    /  DBili  0.5    /  AST  27     /  ALT  8      /  AlkPhos  213    14 Jan 2020 18:39    PT/INR - ( 15 [place] 2020 08:20 )   PT: 13.7 sec;   INR: 1.19 ratio         PTT - ( 15 [place] 2020 08:20 )  PTT:61.0 sec  CARDIAC MARKERS ( 13 Jan 2020 21:40 )  x     / x     / 117 U/L / x     / 6.5 ng/mL

## 2020-01-15 NOTE — PROGRESS NOTE ADULT - ATTENDING COMMENTS
49 yr old man with ESRD s/p DDRT in Dec 2019 complicated by DGF due to ATN and early TMA thought to be due to prograf. Was on belatacept/MMF/Pred immunosuppression, still requiring dialysis.   He was admitted with influenza, course complicated by perinephric hematoma complicated by enterobacter infection. s/p surgical exploration and wash out. He developed DVT started on heparin drip, subsequent gross hematuria. He was taken to the OR and found to have dehiscence of the ureter-bladder anastomosis as well as vascular anastomosis. He underwent graft nephrectomy on 1/10/20 with bovine biopatch repair of the EIA. Was maintained on antibiotics with meropenem, cipro and diflucan. On 1/13/20 he had cardiac arrest due to large pulmonary embolism s/p successful resuscitation.     Overnight he remained on the vent, hemodynamically stable, off sedation this AM drowsy but opens eyes and follows commands.   Lungs fair air entry b/l, abdomen distended, vac present in RLQ, scrotal and b/l LE edema present. Pedal pulses palpable.     Labs reviewed. Hgb 7.2  CXR b/l congestion worse from yesterday     - HD today with UF 2 liters as tolerated, transfuse 1 unit PRBC   - US of RLQ fluid collection   - Continue heparin drip for DVT/PE

## 2020-01-15 NOTE — PROGRESS NOTE ADULT - PROBLEM SELECTOR PLAN 1
s/p DDRT (12/8/19) now s/p transplant nephrectomy. Hospital course complicated yesterday by multiple cardiac events 2/2 saddle PE. Pt. now intubated and being followed closely by 8ICU team. Pt. underwent HD treatment (without UF) yesterday, tolerated well s/p DDRT (12/8/19) now s/p transplant nephrectomy. Hospital course complicated yesterday by multiple cardiac events 2/2 saddle PE. Pt. now intubated and being followed closely by 8ICU team. Pt. underwent HD treatment (without UF) yesterday, tolerated well. Plan for HD today with blood transfusion and UF. Monitor hemodynamics closely

## 2020-01-15 NOTE — PROGRESS NOTE ADULT - SUBJECTIVE AND OBJECTIVE BOX
Seen in SICU, intubated    Vital Signs Last 24 Hrs  T(C): 36.7 (01-15-20 @ 15:00), Max: 37 (01-15-20 @ 03:00)  T(F): 98.1 (01-15-20 @ 15:00), Max: 98.6 (01-15-20 @ 03:00)  HR: 70 (01-15-20 @ 17:00) (60 - 75)  BP(mean): 67 (01-15-20 @ 01:20) (67 - 67)  RR: 17 (01-15-20 @ 17:00) (15 - 23)  SpO2: 100% (01-15-20 @ 17:00) (99% - 100%)    Card S1S2  Lungs coarse BS b/l  Abd soft  Extr + edema b/l LE, AVF patent                                                                                                           7.6    20.49 )-----------( 372      ( 15 [place] 2020 16:11 )             23.8     15 [place] 2020 16:11    138    |  97     |  24     ----------------------------<  90     3.9     |  22     |  5.16     Ca    7.9        15 [place] 2020 16:11  Phos  4.5       15 [place] 2020 16:11  Mg     2.3       15 [place] 2020 16:11    TPro  4.8    /  Alb  2.4    /  TBili  0.7    /  DBili  0.4    /  AST  18     /  ALT  6      /  AlkPhos  182    15 [place] 2020 16:11    LIVER FUNCTIONS - ( 15 [place] 2020 16:11 )  Alb: 2.4 g/dL / Pro: 4.8 g/dL / ALK PHOS: 182 U/L / ALT: 6 U/L / AST: 18 U/L / GGT: x           PT/INR - ( 15 [place] 2020 16:11 )   PT: 13.9 sec;   INR: 1.21 ratio      PTT - ( 15 [place] 2020 16:11 )  PTT:60.3 sec  CAPILLARY BLOOD GLUCOSE    CARDIAC MARKERS ( 13 Jan 2020 21:40 )  x     / x     / 117 U/L / x     / 6.5 ng/mL    aMIOdarone Infusion 0.5 mG/Min IV Continuous <Continuous>  brimonidine 0.2% Ophthalmic Solution 1 Drop(s) Both EYES three times a day  chlorhexidine 0.12% Liquid 15 milliLiter(s) Oral Mucosa <User Schedule>  chlorhexidine 2% Cloths 1 Application(s) Topical <User Schedule>  ciprofloxacin   IVPB 400 milliGRAM(s) IV Intermittent every 24 hours  dextrose 5% + sodium chloride 0.9%. 1000 milliLiter(s) IV Continuous <Continuous>  fluconAZOLE IVPB 400 milliGRAM(s) IV Intermittent every 24 hours  heparin  Infusion 1000 Unit(s)/Hr IV Continuous <Continuous>  HYDROmorphone  Injectable 0.5 milliGRAM(s) IV Push every 3 hours PRN  latanoprost 0.005% Ophthalmic Solution 1 Drop(s) Both EYES daily  meropenem  IVPB 500 milliGRAM(s) IV Intermittent every 24 hours  methylPREDNISolone sodium succinate Injectable 4 milliGRAM(s) IV Push daily  midazolam Injectable 2 milliGRAM(s) IV Push every 2 hours PRN  pantoprazole  Injectable 40 milliGRAM(s) IV Push daily  petrolatum Ophthalmic Ointment 1 Application(s) Both EYES daily    A/P:    Adm w/Flu A 12/27/19  Hx ESRD on HD  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, DGF  S/p transplant kidney bx 12/13: ATN, focal TMA  S/p PLEX 12/13, 12/15  Carlos-nephric hematoma on ultrasound 12/27, increased from prior  S/p OR 1/1 for washout and repeat renal graft biopsy  Repeat renal bx c/w ATN  Infected carlos-nephric hematoma, s/p drainage w/IR  RLE DVT  Gross hematuria  S/p OR 1/10 for infected carlos-nephric hematoma, hemorrhage involving pseudo-aneurysm of anastomosis of renal artery to external iliac vein,   s/p transplant nephrectomy, s/p IVCF   S/p PEA arrest 1/13, intubated, s/p CTA, + PE, R retrop collection, on AC  Abx per ID, off pressors  IR drainage of abd collection  S/p stable HD today, 2 kg fluid removal  Will eval for HD needs daily  D/w transplant team

## 2020-01-15 NOTE — PROGRESS NOTE ADULT - ASSESSMENT
49M PMHx of ESRD on HD  ( via LUE AVF), HTN, Gout, Glaucoma, NET of pancreas, RA with hand contractures. s/p DDRT 12/8/19,  Post op course c/b DGF requiring HD. Renal bx x 2 c/w profound ATN.     Re-admitted with Influenza and perinephric hematoma. s/p hematoma evacuation/ abd washout and renal biopsy. Post op course c/b leukocytosis, infected hematoma, acute RLE DVT, and bleeding pseudoaneurysm at txp renal artery anastomosis. Now s/p graft nephrectomy. Course further complicated by saddle PE causing right heart strain and hemodynamic instability leading to cardiac arrest requiring ACLS, now with ROSC and intubated    [] DDRT c/b DGF/ATN/perinephric infected hematoma/pseudoneurysm/graft nephrectomy   - new fluid collection noted on CT from overnight, defer drainage until more pressing issue with PE has been adequately addressed and patient is more stable  - off immunosupression, decrease prednisone to 2.5mg QD and will plan to DC soon  - off  valcyte/bactrim  - HD per nephrology as scheduled  - ID following: cont zora/cipro/fluc. f/u OR cx from 1/10 (growing enterobacter), received amikacin 1/7 and 1/8   - pain control  - bowel regimen  - PT/OT/OOB  - Wound vac change (Wednesday)    [] Saddle PE, with right heart strain and complicated by PEA arrest followed by Torsade, ROSC achieved after ACLS  - vascular, CT and vascular cardiology consulted  - continue anticoagulation and further plan (possible AngioJet) pending review of CT scan from overnight    [] New acute DVT of R external iliac, common femoral, femoral veins, R soleal vein requiring heparin drip  - IVC filter placed 1/9, on heparin drip     [] Dispo  - continue SICU care 49M PMHx of ESRD on HD  ( via LUE AVF), HTN, Gout, Glaucoma, NET of pancreas, RA with hand contractures. s/p DDRT 12/8/19,  Post op course c/b DGF requiring HD. Renal bx x 2 c/w profound ATN.     Re-admitted with Influenza and perinephric hematoma. s/p hematoma evacuation/ abd washout and renal biopsy. Post op course c/b leukocytosis, infected hematoma, acute RLE DVT, and bleeding pseudoaneurysm at txp renal artery anastomosis. Now s/p graft nephrectomy. Course further complicated by saddle PE causing right heart strain and hemodynamic instability leading to cardiac arrest requiring ACLS, now with ROSC and intubated    [] DDRT c/b DGF/ATN/perinephric infected hematoma/pseudoneurysm/graft nephrectomy   - new fluid collection noted on CT, evaluate with renal US, will also evaluate bladder   - plan for HD today with 1 unit of pRBC  - off immunosupression, decrease prednisone to 2.5mg QD and will plan to DC soon  - off  valcyte/bactrim  - HD per nephrology as scheduled  - ID following: cont zora/cipro/fluc. f/u OR cx from 1/10 (growing enterobacter), received amikacin 1/7 and 1/8   - pain control  - bowel regimen  - PT/OT/OOB  - Wound vac change (Wednesday)    [] Saddle PE, with right heart strain and complicated by PEA arrest followed by Torsade, ROSC achieved after ACLS  - vascular, CT and vascular cardiology consulted  - continue anticoagulation and no plan for invasive intervention   - repeat echocardiogram to assess right ventricle     [] New acute DVT of R external iliac, common femoral, femoral veins, R soleal vein requiring heparin drip  - IVC filter placed 1/9, on heparin drip     [] Dispo  - continue SICU care

## 2020-01-15 NOTE — PROGRESS NOTE ADULT - PROBLEM SELECTOR PLAN 5
Pt. with acute cardiopulmonary decompensation in the setting of saddle embolus on 1/14/20. Pt. is intubated and being monitored closely by 8ICU team. Pt. to remain on anticoagulation. FiO2 requirements stable at 30%

## 2020-01-15 NOTE — PROGRESS NOTE ADULT - ATTENDING COMMENTS
Patient seen and examined on AM rounds on 1/15.  No acute events overnight     Acute hypoxic respiratory failure secondary to saddle PE - will continue MV at this time and monitor oxygenation with goal SpO2 > 92%  -oxygenation appropriate   Saddle PE -  Will continue heparin drip with goal PTT around 60-99  -will continue to monitor hemodynamics at this time.     ESRD - will plan HD as per transplant nephrology    CC time: 35 minutes    Patient to continue to be monitored closely

## 2020-01-15 NOTE — PROGRESS NOTE ADULT - SUBJECTIVE AND OBJECTIVE BOX
Interventional Radiology Brief- Operative Note    Procedure: Drainage of right pelvic fluid collection    Operators: Dieudonne    Anesthesia (type): lidocaine local    Contrast: none    EBL: none    Findings/Follow up Plan of Care: US guided drainage of right pelvic colleciton performed at bedside. 10 Fr drain placed. Appx 220cc dark red fluid aspirated. Drain placed to BO. Pt tolerated procedure. Full report to follow.    Specimens Removed: sample of fluid collected for microbiology    Implants: as above    Complications: none    Condition/Disposition: stable / sicu    Please call Interventional Radiology x [phone] with any questions, concerns, or issues.

## 2020-01-16 LAB
ALBUMIN SERPL ELPH-MCNC: 2.3 G/DL — LOW (ref 3.3–5)
ALBUMIN SERPL ELPH-MCNC: 2.4 G/DL — LOW (ref 3.3–5)
ALBUMIN SERPL ELPH-MCNC: 2.4 G/DL — LOW (ref 3.3–5)
ALBUMIN SERPL ELPH-MCNC: 2.7 G/DL — LOW (ref 3.3–5)
ALP SERPL-CCNC: 176 U/L — HIGH (ref 40–120)
ALP SERPL-CCNC: 179 U/L — HIGH (ref 40–120)
ALP SERPL-CCNC: 185 U/L — HIGH (ref 40–120)
ALP SERPL-CCNC: 195 U/L — HIGH (ref 40–120)
ALT FLD-CCNC: 5 U/L — LOW (ref 10–45)
ALT FLD-CCNC: 8 U/L — LOW (ref 10–45)
ALT FLD-CCNC: <5 U/L — LOW (ref 10–45)
ALT FLD-CCNC: <5 U/L — LOW (ref 10–45)
ANION GAP SERPL CALC-SCNC: 15 MMOL/L — SIGNIFICANT CHANGE UP (ref 5–17)
ANION GAP SERPL CALC-SCNC: 15 MMOL/L — SIGNIFICANT CHANGE UP (ref 5–17)
ANION GAP SERPL CALC-SCNC: 19 MMOL/L — HIGH (ref 5–17)
ANION GAP SERPL CALC-SCNC: 19 MMOL/L — HIGH (ref 5–17)
APTT BLD: 44 SEC — HIGH (ref 27.5–36.3)
APTT BLD: 52.6 SEC — HIGH (ref 27.5–36.3)
APTT BLD: 61.6 SEC — HIGH (ref 27.5–36.3)
APTT BLD: 75 SEC — HIGH (ref 27.5–36.3)
AST SERPL-CCNC: 13 U/L — SIGNIFICANT CHANGE UP (ref 10–40)
AST SERPL-CCNC: 15 U/L — SIGNIFICANT CHANGE UP (ref 10–40)
AST SERPL-CCNC: 17 U/L — SIGNIFICANT CHANGE UP (ref 10–40)
AST SERPL-CCNC: 18 U/L — SIGNIFICANT CHANGE UP (ref 10–40)
BILIRUB DIRECT SERPL-MCNC: 0.8 MG/DL — HIGH (ref 0–0.2)
BILIRUB DIRECT SERPL-MCNC: 0.9 MG/DL — HIGH (ref 0–0.2)
BILIRUB DIRECT SERPL-MCNC: 1 MG/DL — HIGH (ref 0–0.2)
BILIRUB DIRECT SERPL-MCNC: 1 MG/DL — HIGH (ref 0–0.2)
BILIRUB INDIRECT FLD-MCNC: 0.4 MG/DL — SIGNIFICANT CHANGE UP (ref 0.2–1)
BILIRUB INDIRECT FLD-MCNC: 0.5 MG/DL — SIGNIFICANT CHANGE UP (ref 0.2–1)
BILIRUB INDIRECT FLD-MCNC: 0.5 MG/DL — SIGNIFICANT CHANGE UP (ref 0.2–1)
BILIRUB INDIRECT FLD-MCNC: 0.8 MG/DL — SIGNIFICANT CHANGE UP (ref 0.2–1)
BILIRUB SERPL-MCNC: 1.3 MG/DL — HIGH (ref 0.2–1.2)
BILIRUB SERPL-MCNC: 1.3 MG/DL — HIGH (ref 0.2–1.2)
BILIRUB SERPL-MCNC: 1.5 MG/DL — HIGH (ref 0.2–1.2)
BILIRUB SERPL-MCNC: 1.8 MG/DL — HIGH (ref 0.2–1.2)
BKV DNA SPEC QL NAA+PROBE: SIGNIFICANT CHANGE UP
BUN SERPL-MCNC: 15 MG/DL — SIGNIFICANT CHANGE UP (ref 7–23)
BUN SERPL-MCNC: 17 MG/DL — SIGNIFICANT CHANGE UP (ref 7–23)
BUN SERPL-MCNC: 18 MG/DL — SIGNIFICANT CHANGE UP (ref 7–23)
BUN SERPL-MCNC: 20 MG/DL — SIGNIFICANT CHANGE UP (ref 7–23)
CALCIUM SERPL-MCNC: 7.9 MG/DL — LOW (ref 8.4–10.5)
CALCIUM SERPL-MCNC: 7.9 MG/DL — LOW (ref 8.4–10.5)
CALCIUM SERPL-MCNC: 8 MG/DL — LOW (ref 8.4–10.5)
CALCIUM SERPL-MCNC: 8.4 MG/DL — SIGNIFICANT CHANGE UP (ref 8.4–10.5)
CHLORIDE SERPL-SCNC: 95 MMOL/L — LOW (ref 96–108)
CHLORIDE SERPL-SCNC: 96 MMOL/L — SIGNIFICANT CHANGE UP (ref 96–108)
CHLORIDE SERPL-SCNC: 96 MMOL/L — SIGNIFICANT CHANGE UP (ref 96–108)
CHLORIDE SERPL-SCNC: 97 MMOL/L — SIGNIFICANT CHANGE UP (ref 96–108)
CMV DNA CSF QL NAA+PROBE: SIGNIFICANT CHANGE UP
CMV DNA SPEC NAA+PROBE-LOG#: SIGNIFICANT CHANGE UP LOG10IU/ML
CO2 SERPL-SCNC: 21 MMOL/L — LOW (ref 22–31)
CO2 SERPL-SCNC: 23 MMOL/L — SIGNIFICANT CHANGE UP (ref 22–31)
CO2 SERPL-SCNC: 24 MMOL/L — SIGNIFICANT CHANGE UP (ref 22–31)
CO2 SERPL-SCNC: 25 MMOL/L — SIGNIFICANT CHANGE UP (ref 22–31)
CREAT SERPL-MCNC: 3.91 MG/DL — HIGH (ref 0.5–1.3)
CREAT SERPL-MCNC: 4.4 MG/DL — HIGH (ref 0.5–1.3)
CREAT SERPL-MCNC: 4.71 MG/DL — HIGH (ref 0.5–1.3)
CREAT SERPL-MCNC: 4.87 MG/DL — HIGH (ref 0.5–1.3)
CULTURE RESULTS: SIGNIFICANT CHANGE UP
GAS PNL BLDA: SIGNIFICANT CHANGE UP
GLUCOSE SERPL-MCNC: 104 MG/DL — HIGH (ref 70–99)
GLUCOSE SERPL-MCNC: 105 MG/DL — HIGH (ref 70–99)
GLUCOSE SERPL-MCNC: 129 MG/DL — HIGH (ref 70–99)
GLUCOSE SERPL-MCNC: 86 MG/DL — SIGNIFICANT CHANGE UP (ref 70–99)
GRAM STN FLD: SIGNIFICANT CHANGE UP
HCT VFR BLD CALC: 26.7 % — LOW (ref 39–50)
HCT VFR BLD CALC: 27.3 % — LOW (ref 39–50)
HCT VFR BLD CALC: 27.9 % — LOW (ref 39–50)
HCT VFR BLD CALC: 29.3 % — LOW (ref 39–50)
HGB BLD-MCNC: 8.7 G/DL — LOW (ref 13–17)
HGB BLD-MCNC: 9 G/DL — LOW (ref 13–17)
HGB BLD-MCNC: 9.3 G/DL — LOW (ref 13–17)
HGB BLD-MCNC: 9.4 G/DL — LOW (ref 13–17)
INR BLD: 1.31 RATIO — HIGH (ref 0.88–1.16)
INR BLD: 1.32 RATIO — HIGH (ref 0.88–1.16)
INR BLD: 1.37 RATIO — HIGH (ref 0.88–1.16)
INR BLD: 1.42 RATIO — HIGH (ref 0.88–1.16)
MAGNESIUM SERPL-MCNC: 2 MG/DL — SIGNIFICANT CHANGE UP (ref 1.6–2.6)
MAGNESIUM SERPL-MCNC: 2.1 MG/DL — SIGNIFICANT CHANGE UP (ref 1.6–2.6)
MAGNESIUM SERPL-MCNC: 2.1 MG/DL — SIGNIFICANT CHANGE UP (ref 1.6–2.6)
MAGNESIUM SERPL-MCNC: 2.2 MG/DL — SIGNIFICANT CHANGE UP (ref 1.6–2.6)
MCHC RBC-ENTMCNC: 28.9 PG — SIGNIFICANT CHANGE UP (ref 27–34)
MCHC RBC-ENTMCNC: 29 PG — SIGNIFICANT CHANGE UP (ref 27–34)
MCHC RBC-ENTMCNC: 30.1 PG — SIGNIFICANT CHANGE UP (ref 27–34)
MCHC RBC-ENTMCNC: 30.1 PG — SIGNIFICANT CHANGE UP (ref 27–34)
MCHC RBC-ENTMCNC: 31.9 GM/DL — LOW (ref 32–36)
MCHC RBC-ENTMCNC: 32.1 GM/DL — SIGNIFICANT CHANGE UP (ref 32–36)
MCHC RBC-ENTMCNC: 33.3 GM/DL — SIGNIFICANT CHANGE UP (ref 32–36)
MCHC RBC-ENTMCNC: 33.7 GM/DL — SIGNIFICANT CHANGE UP (ref 32–36)
MCV RBC AUTO: 89.3 FL — SIGNIFICANT CHANGE UP (ref 80–100)
MCV RBC AUTO: 90.3 FL — SIGNIFICANT CHANGE UP (ref 80–100)
MCV RBC AUTO: 90.4 FL — SIGNIFICANT CHANGE UP (ref 80–100)
MCV RBC AUTO: 90.7 FL — SIGNIFICANT CHANGE UP (ref 80–100)
NRBC # BLD: 0 /100 WBCS — SIGNIFICANT CHANGE UP (ref 0–0)
ORGANISM # SPEC MICROSCOPIC CNT: SIGNIFICANT CHANGE UP
ORGANISM # SPEC MICROSCOPIC CNT: SIGNIFICANT CHANGE UP
PHOSPHATE SERPL-MCNC: 3.3 MG/DL — SIGNIFICANT CHANGE UP (ref 2.5–4.5)
PHOSPHATE SERPL-MCNC: 3.5 MG/DL — SIGNIFICANT CHANGE UP (ref 2.5–4.5)
PHOSPHATE SERPL-MCNC: 3.6 MG/DL — SIGNIFICANT CHANGE UP (ref 2.5–4.5)
PHOSPHATE SERPL-MCNC: 3.9 MG/DL — SIGNIFICANT CHANGE UP (ref 2.5–4.5)
PLATELET # BLD AUTO: 349 K/UL — SIGNIFICANT CHANGE UP (ref 150–400)
PLATELET # BLD AUTO: 354 K/UL — SIGNIFICANT CHANGE UP (ref 150–400)
PLATELET # BLD AUTO: 358 K/UL — SIGNIFICANT CHANGE UP (ref 150–400)
PLATELET # BLD AUTO: 368 K/UL — SIGNIFICANT CHANGE UP (ref 150–400)
POTASSIUM SERPL-MCNC: 3.6 MMOL/L — SIGNIFICANT CHANGE UP (ref 3.5–5.3)
POTASSIUM SERPL-MCNC: 3.8 MMOL/L — SIGNIFICANT CHANGE UP (ref 3.5–5.3)
POTASSIUM SERPL-MCNC: 3.9 MMOL/L — SIGNIFICANT CHANGE UP (ref 3.5–5.3)
POTASSIUM SERPL-MCNC: 3.9 MMOL/L — SIGNIFICANT CHANGE UP (ref 3.5–5.3)
POTASSIUM SERPL-SCNC: 3.6 MMOL/L — SIGNIFICANT CHANGE UP (ref 3.5–5.3)
POTASSIUM SERPL-SCNC: 3.8 MMOL/L — SIGNIFICANT CHANGE UP (ref 3.5–5.3)
POTASSIUM SERPL-SCNC: 3.9 MMOL/L — SIGNIFICANT CHANGE UP (ref 3.5–5.3)
POTASSIUM SERPL-SCNC: 3.9 MMOL/L — SIGNIFICANT CHANGE UP (ref 3.5–5.3)
PROT SERPL-MCNC: 5.1 G/DL — LOW (ref 6–8.3)
PROT SERPL-MCNC: 5.2 G/DL — LOW (ref 6–8.3)
PROT SERPL-MCNC: 5.4 G/DL — LOW (ref 6–8.3)
PROT SERPL-MCNC: 5.6 G/DL — LOW (ref 6–8.3)
PROTHROM AB SERPL-ACNC: 15 SEC — HIGH (ref 10–12.9)
PROTHROM AB SERPL-ACNC: 15.3 SEC — HIGH (ref 10–12.9)
PROTHROM AB SERPL-ACNC: 15.8 SEC — HIGH (ref 10–12.9)
PROTHROM AB SERPL-ACNC: 16.5 SEC — HIGH (ref 10–12.9)
RBC # BLD: 2.99 M/UL — LOW (ref 4.2–5.8)
RBC # BLD: 3.01 M/UL — LOW (ref 4.2–5.8)
RBC # BLD: 3.09 M/UL — LOW (ref 4.2–5.8)
RBC # BLD: 3.24 M/UL — LOW (ref 4.2–5.8)
RBC # FLD: 17.8 % — HIGH (ref 10.3–14.5)
RBC # FLD: 18.5 % — HIGH (ref 10.3–14.5)
RBC # FLD: 18.6 % — HIGH (ref 10.3–14.5)
RBC # FLD: 18.9 % — HIGH (ref 10.3–14.5)
SODIUM SERPL-SCNC: 135 MMOL/L — SIGNIFICANT CHANGE UP (ref 135–145)
SODIUM SERPL-SCNC: 135 MMOL/L — SIGNIFICANT CHANGE UP (ref 135–145)
SODIUM SERPL-SCNC: 137 MMOL/L — SIGNIFICANT CHANGE UP (ref 135–145)
SODIUM SERPL-SCNC: 138 MMOL/L — SIGNIFICANT CHANGE UP (ref 135–145)
SPECIMEN SOURCE: SIGNIFICANT CHANGE UP
SPECIMEN SOURCE: SIGNIFICANT CHANGE UP
WBC # BLD: 27.07 K/UL — HIGH (ref 3.8–10.5)
WBC # BLD: 31.2 K/UL — HIGH (ref 3.8–10.5)
WBC # BLD: 33.21 K/UL — HIGH (ref 3.8–10.5)
WBC # BLD: 33.58 K/UL — HIGH (ref 3.8–10.5)
WBC # FLD AUTO: 27.07 K/UL — HIGH (ref 3.8–10.5)
WBC # FLD AUTO: 31.2 K/UL — HIGH (ref 3.8–10.5)
WBC # FLD AUTO: 33.21 K/UL — HIGH (ref 3.8–10.5)
WBC # FLD AUTO: 33.58 K/UL — HIGH (ref 3.8–10.5)

## 2020-01-16 PROCEDURE — 99291 CRITICAL CARE FIRST HOUR: CPT

## 2020-01-16 PROCEDURE — 99231 SBSQ HOSP IP/OBS SF/LOW 25: CPT

## 2020-01-16 PROCEDURE — 93010 ELECTROCARDIOGRAM REPORT: CPT

## 2020-01-16 PROCEDURE — 99232 SBSQ HOSP IP/OBS MODERATE 35: CPT

## 2020-01-16 PROCEDURE — 71045 X-RAY EXAM CHEST 1 VIEW: CPT | Mod: 26

## 2020-01-16 PROCEDURE — 99232 SBSQ HOSP IP/OBS MODERATE 35: CPT | Mod: GC

## 2020-01-16 RX ORDER — AMIODARONE HYDROCHLORIDE 400 MG/1
200 TABLET ORAL
Refills: 0 | Status: COMPLETED | OUTPATIENT
Start: 2020-01-16 | End: 2020-01-26

## 2020-01-16 RX ORDER — VANCOMYCIN HCL 1 G
1000 VIAL (EA) INTRAVENOUS ONCE
Refills: 0 | Status: COMPLETED | OUTPATIENT
Start: 2020-01-16 | End: 2020-01-16

## 2020-01-16 RX ADMIN — FLUCONAZOLE 100 MILLIGRAM(S): 150 TABLET ORAL at 06:36

## 2020-01-16 RX ADMIN — HYDROMORPHONE HYDROCHLORIDE 0.5 MILLIGRAM(S): 2 INJECTION INTRAMUSCULAR; INTRAVENOUS; SUBCUTANEOUS at 01:30

## 2020-01-16 RX ADMIN — Medication 1 APPLICATION(S): at 11:34

## 2020-01-16 RX ADMIN — Medication 4 MILLIGRAM(S): at 05:00

## 2020-01-16 RX ADMIN — HEPARIN SODIUM 12 UNIT(S)/HR: 5000 INJECTION INTRAVENOUS; SUBCUTANEOUS at 16:17

## 2020-01-16 RX ADMIN — AMIODARONE HYDROCHLORIDE 16.67 MG/MIN: 400 TABLET ORAL at 04:17

## 2020-01-16 RX ADMIN — PANTOPRAZOLE SODIUM 40 MILLIGRAM(S): 20 TABLET, DELAYED RELEASE ORAL at 11:37

## 2020-01-16 RX ADMIN — HYDROMORPHONE HYDROCHLORIDE 0.5 MILLIGRAM(S): 2 INJECTION INTRAMUSCULAR; INTRAVENOUS; SUBCUTANEOUS at 20:38

## 2020-01-16 RX ADMIN — AMIODARONE HYDROCHLORIDE 16.67 MG/MIN: 400 TABLET ORAL at 19:10

## 2020-01-16 RX ADMIN — CHLORHEXIDINE GLUCONATE 1 APPLICATION(S): 213 SOLUTION TOPICAL at 05:00

## 2020-01-16 RX ADMIN — CHLORHEXIDINE GLUCONATE 15 MILLILITER(S): 213 SOLUTION TOPICAL at 05:00

## 2020-01-16 RX ADMIN — MIDAZOLAM HYDROCHLORIDE 2 MILLIGRAM(S): 1 INJECTION, SOLUTION INTRAMUSCULAR; INTRAVENOUS at 02:21

## 2020-01-16 RX ADMIN — BRIMONIDINE TARTRATE 1 DROP(S): 2 SOLUTION/ DROPS OPHTHALMIC at 22:40

## 2020-01-16 RX ADMIN — SODIUM CHLORIDE 50 MILLILITER(S): 9 INJECTION, SOLUTION INTRAVENOUS at 02:22

## 2020-01-16 RX ADMIN — HEPARIN SODIUM 12 UNIT(S)/HR: 5000 INJECTION INTRAVENOUS; SUBCUTANEOUS at 22:54

## 2020-01-16 RX ADMIN — LATANOPROST 1 DROP(S): 0.05 SOLUTION/ DROPS OPHTHALMIC; TOPICAL at 11:33

## 2020-01-16 RX ADMIN — SODIUM CHLORIDE 50 MILLILITER(S): 9 INJECTION, SOLUTION INTRAVENOUS at 10:44

## 2020-01-16 RX ADMIN — HYDROMORPHONE HYDROCHLORIDE 0.5 MILLIGRAM(S): 2 INJECTION INTRAMUSCULAR; INTRAVENOUS; SUBCUTANEOUS at 04:25

## 2020-01-16 RX ADMIN — BRIMONIDINE TARTRATE 1 DROP(S): 2 SOLUTION/ DROPS OPHTHALMIC at 05:00

## 2020-01-16 RX ADMIN — HYDROMORPHONE HYDROCHLORIDE 0.5 MILLIGRAM(S): 2 INJECTION INTRAMUSCULAR; INTRAVENOUS; SUBCUTANEOUS at 04:09

## 2020-01-16 RX ADMIN — Medication 200 MILLIGRAM(S): at 10:44

## 2020-01-16 RX ADMIN — HYDROMORPHONE HYDROCHLORIDE 0.5 MILLIGRAM(S): 2 INJECTION INTRAMUSCULAR; INTRAVENOUS; SUBCUTANEOUS at 11:31

## 2020-01-16 RX ADMIN — HYDROMORPHONE HYDROCHLORIDE 0.5 MILLIGRAM(S): 2 INJECTION INTRAMUSCULAR; INTRAVENOUS; SUBCUTANEOUS at 11:45

## 2020-01-16 RX ADMIN — BRIMONIDINE TARTRATE 1 DROP(S): 2 SOLUTION/ DROPS OPHTHALMIC at 13:31

## 2020-01-16 RX ADMIN — Medication 250 MILLIGRAM(S): at 11:37

## 2020-01-16 RX ADMIN — MEROPENEM 100 MILLIGRAM(S): 1 INJECTION INTRAVENOUS at 20:22

## 2020-01-16 RX ADMIN — HYDROMORPHONE HYDROCHLORIDE 0.5 MILLIGRAM(S): 2 INJECTION INTRAMUSCULAR; INTRAVENOUS; SUBCUTANEOUS at 20:23

## 2020-01-16 RX ADMIN — HYDROMORPHONE HYDROCHLORIDE 0.5 MILLIGRAM(S): 2 INJECTION INTRAMUSCULAR; INTRAVENOUS; SUBCUTANEOUS at 01:15

## 2020-01-16 NOTE — PROGRESS NOTE ADULT - SUBJECTIVE AND OBJECTIVE BOX
Patient  has  RLQ  RP  collection  drained  Mostly  serous  fluid   draining  Gram stain negative  I discussed with  Dr Guaman about  obtaining  Plastics  consult  for  possible  muscle  coverage  for  the  arterial  repair  WBC is  [phone]

## 2020-01-16 NOTE — AIRWAY REMOVAL NOTE  ADULT & PEDS - ARTIFICAL AIRWAY REMOVAL COMMENTS
written order of extubation verified, the pt was identified by full name and birth date compared to the identification band, present during the procedure was RN and MD.
Written order for extubation verified. The patient was identified by full name and birth date compared to the identification band. Present during the procedure was JESSIE Jones

## 2020-01-16 NOTE — PROGRESS NOTE ADULT - ASSESSMENT
Assessment	  KAYLA PA is a 49y Male with ESRD s/p DDRT on 12/8/19 with course complicated by delayed graft function requiring HD, and infected perinephric hematoma s/p washout on 1/1/2020 with worsening sepsis and transfer to ICU, s/p IR drainage of perinephric collection on 1/6. 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 He underwent operative repair of right external iliac artery with pericardial patch, transplant nephrectomy, RLE thrombectomy, and IVC filter on 1/9.     Plan   - f/u IR drain culture   - possible extubate  - appreciate excellent SICU care

## 2020-01-16 NOTE — PROGRESS NOTE ADULT - ATTENDING COMMENTS
Patient seen and examined and agree with above.   s/p drainage of pelvic hematoma approx 250 cc yesterday  Current management includes:  1) mental status is appropriate with dilaudid for pain PRN    2) Acute respiratory failure s/p trauma- continue mechanical ventilation at this time.  Oxygenation and ventilation appropriate  CXR reviewed- overall clear   Met criteria of the spontaneous breathing trial and if patient meets criteria will proceed to extubation  RSBI 30   Continue oxygen supplementation with goal SpO2> 92%    3) Hemodynamically stable  -continue amiodarone at this time for recent episodes of torsades    4) Elevated leukocytosis - continue antibiotics   -will get blood cultures and give dose of vancomycin    5) ESRD - received HD yesterday and will follow up with nephrology     6) Saddle PE - continue heparin drip with goal 60-99  PTT at 2 pm    Patient with clinical improvement but continues to be critically ill.   CC time: 41 minutes

## 2020-01-16 NOTE — PROGRESS NOTE ADULT - SUBJECTIVE AND OBJECTIVE BOX
[facility] DIVISION OF KIDNEY DISEASES AND HYPERTENSION -- FOLLOW UP NOTE  --------------------------------------------------------------------------------  HPI: 50 yo M with hx of ESRD on HD, s/p DDRT on 12/8, elevated SCr on HD using AVF, complicated by TMA related to CNI, now on belatacept, admitted with influenza. Pt. overall with complicated hospital course, including persistent hematuria, RLE DVT, and DGF. Had transplant nephrectomy along with endovascular repair and IVC filter placement on 1/10/20. In the past 24 hours, pt. had multiple cardiac events 2/2 saddle PE, despite IVC filter placement and heparin gtt. Pt. is currently intubated and on  amiodarone gtt. Pt. remained with stable FiO2 requirement overnight (30%).     Pt. seen and examined at bedside this AM in SICU. Yesterday had bedside drainage of collection at previous transplantation site (220 cc of dark fluid removed). Pt. remains hemodynamically stable. Tolerated HD yesterday with 2L of UF.  PAST HISTORY  --------------------------------------------------------------------------------  No significant changes to PMH, PSH, FHx, SHx, unless otherwise noted    ALLERGIES & MEDICATIONS  --------------------------------------------------------------------------------  Allergies    coconut (Anaphylaxis)  morphine (Pruritus; Rash)    Intolerances    Standing Inpatient Medications  aMIOdarone Infusion 0.5 mG/Min IV Continuous <Continuous>  brimonidine 0.2% Ophthalmic Solution 1 Drop(s) Both EYES three times a day  chlorhexidine 0.12% Liquid 15 milliLiter(s) Oral Mucosa <User Schedule>  chlorhexidine 2% Cloths 1 Application(s) Topical <User Schedule>  ciprofloxacin   IVPB 400 milliGRAM(s) IV Intermittent every 24 hours  dextrose 5% + sodium chloride 0.9%. 1000 milliLiter(s) IV Continuous <Continuous>  fluconAZOLE IVPB 400 milliGRAM(s) IV Intermittent every 24 hours  heparin  Infusion 1000 Unit(s)/Hr IV Continuous <Continuous>  latanoprost 0.005% Ophthalmic Solution 1 Drop(s) Both EYES daily  meropenem  IVPB 500 milliGRAM(s) IV Intermittent every 24 hours  methylPREDNISolone sodium succinate Injectable 4 milliGRAM(s) IV Push daily  pantoprazole  Injectable 40 milliGRAM(s) IV Push daily  petrolatum Ophthalmic Ointment 1 Application(s) Both EYES daily    REVIEW OF SYSTEMS  --------------------------------------------------------------------------------  Unable to obtain.    VITALS/PHYSICAL EXAM  --------------------------------------------------------------------------------  T(C): 37.7 (01-16-20 @ 04:00), Max: 38 (01-16-20 @ 03:00)  HR: 78 (01-16-20 @ 06:00) (67 - 89)  BP: 150/61 (01-15-20 @ 19:00) (150/61 - 150/61)  RR: 17 (01-16-20 @ 06:00) (11 - 24)  SpO2: 100% (01-16-20 @ 06:00) (74% - 100%)  Wt(kg): --    01-14-20 @ 07:01  -  01-15-20 @ 07:00  --------------------------------------------------------  IN: 2766.4 mL / OUT: 1000 mL / NET: 1766.4 mL    01-15-20 @ 07:01  -  01-16-20 @ 06:30  --------------------------------------------------------  IN: 2759.8 mL / OUT: 2935 mL / NET: -175.2 mL    Physical Exam:  	Gen: Intubated, easily aroused with physical/verbal stimuli  	HEENT: + ETT  	Pulm: + mechanical breath sounds   	CV: RRR, S1S2; no rub  	Abd: +BS, soft, RLQ with wound vac in place                       Transplant: No tenderness, swelling  	: No suprapubic tenderness  	LE: b/l pitting edema, R>L (improved)    LABS/STUDIES  --------------------------------------------------------------------------------              9.4    27.07 >-----------<  358      [01-16-20 @ 00:39]              29.3     137  |  97  |  15  ----------------------------<  86      [01-16-20 @ 00:39]  3.6   |  25  |  3.91        Ca     8.4     [01-16-20 @ 00:39]      Mg     2.2     [01-16-20 @ 00:39]      Phos  3.3     [01-16-20 @ 00:39]    Creatinine Trend:  SCr 3.91 [01-16 @ 00:39]  SCr 5.16 [01-15 @ 16:11]  SCr 4.91 [01-15 @ 08:20]  SCr 4.64 [01-15 @ 00:18]  SCr 4.12 [01-14 @ 18:39]

## 2020-01-16 NOTE — PROGRESS NOTE ADULT - PROBLEM SELECTOR PLAN 2
- Leukocytosis worsened today; recommend monitoring trend. Follow up ID team recommendations.  - On methylprenisonolone per SICU team

## 2020-01-16 NOTE — PROGRESS NOTE ADULT - ASSESSMENT
49 year old male with ESRD on HD, now s/p ddrt on 12/8/2019 course complicated by TMA/profound ATN, who presents from a HD center with a fever and cough. He was initially admitted with influenza. He is s/p washout of infected collection and biopsy which revealed ATN.  He was found to have diffusely enlarging perinephric fluid collection and is awaiting drainage.    While on telemetry, he had an episode of a wide complex tachycardia. It initially appears irregular, suggesting an atrial arrhythmia with aberrancy, though the remainder appears more like an episode of non-sustained VT.   s/p washout on 1/1/2020 with worsening sepsis and transfer to ICU, s/p IR drainage of perinephric collection on 1/6. 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 He underwent operative repair of right external iliac artery with pericardial patch, transplant nephrectomy, RLE thrombectomy, and IVC filter on 1/9.     Now s/p corrina ->tachy arrest with tdp, af rvr, now on amio, vented, with large PE    - critical events noted. now off of pressor support. Bp has improved overall  - developed malignant arrhythmias which seemed to start with bradycardia, leading to more tachycardia and bursts of VT and rapid AF.  The apparent af degenerated into torsades.  He was shocked and has been maintaining SR since  - cont amio gtt for now, with eventual transition to po.  - arrhythmias were most likely triggered by the massive vte event, are unlikely to represent a primary cardiac issue  - cannot exclude an ischemic contribution, though the likelihood of that is small overall.  - hs trop noted, though in the context of his arrhythmias, and right heart strain from the pe, the trop is more likely to reflect demand than an acs event  - ideally would be on asa, statin when able    - Cont on mechanical ventilatory support. Wean as tolerated.     - vasc medicine to evaluate but for now heparin gtt remains appropriate and the source of the thrombus is known  - echo results noted, with preserved/hyperdynamic lv, with RV failure consistent with acute RV overload  - Further cardiac workup will depend on clinical course.   - Patient at high risk for decompensation given the above comorbidities and active medical issues. Critically ill. I have personally spent >35 minutes  of critical care time in examining patient, reviewing chart, discussing care/management.

## 2020-01-16 NOTE — PROGRESS NOTE ADULT - SUBJECTIVE AND OBJECTIVE BOX
SICU Progress Note  =====================================================  Interval/Overnight Events:   -- Patient underwent bedside ultrasound which recapitulated large iliac fossa fluid collection  -- S/p percutaneous drainage by IR attending at the bedside. Approx 220 cc of dark red fluid aspirated and send for cultures. A 10 Fr drain was left in place.  -- Wound-VAC replaced post-procedure, procedure was well-tolerated.  -- Currently HD stable with pressor support.      HPI:49y Male ESRD on HD  (via LUE AVF), HTN, Gout, Glaucoma, PNET s/p robotic distal panc/splenectomy (at [place] 2015 by Dr. Young, followed by Dr. Raphael, [facility] Oncologist), RA with hand contractures. 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 Post op course c/b DGF requiring HD, thrombocytopenia, elevated LDH and Haptoglobulin. Schistocytes  on peripheral smear concerning for TMA. Envarsus held. Received Belatacept 12/13. Started on PLEX (12/12, 12/15). Underwent renal bx on 12/13 c/w profound ATN.  Found to have much improvement to levels, likely related to Tacrolimus. He was discharged home on 12/20/19 w/ outpatient HD.     He was now sent to ED by dialysis center after he was found to be febrile to 102, he did not get HD this AM, and last full HD session was on 12/26. Upon arrival, he was febrile to 101.7, other vitals were stable. He states that he had a cough that began 5-6 days ago that has now mostly resolved. He reports no other febrile episodes other than this morning. He admits to having one SOB episode earlier this week that resolved after he got HD. He also states that his mother was hospitalized earlier this week and was flu+. Currently makes less than 1 cup urine/daily. He currently denies SOB, CP, dyspnea, HA, dizziness, N/V, diarrhea, constipation.     1/1/2020 was taken to OR and found to have a perinephric hematoma. Underwent washout, evacuation of hematoma, and biopsy of transplanted kidney. He was recovering on the floor when overnight he spiked a fever to 38.1 and had 18 beats of wide complex tachycardia. He was also found to have worsening leukocytosis and became acutely hypotensive. A CT scan was obtained demonstrating a 4.5x3.7 perinephric collection. IR placed a drain on 1/6. Post-procedure, patient began having gross hematuria. CBI was started with improvement in hematuria. Repeat renal Duplex on 1/7 demonstrated no change in the perinephric collection. RLE was noted to be more swollen so a LE Duplex was obtained on 1/8, which demonstrated a right external iliac and common femoral DVT so a heparin infusion was started. However, patient had worsening hematuria w/ downtrending HCT. 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 A stent was placed to temporize the patient and he was taken to the OR early on 1/10 for an emergent right groin exploration, RP washout of hematoma, transplant nephrectomy, right external iliac artery repair w/ bovine patch, thrombectomy of the RLE veins, removal of the IR stent, and external iliac vein ligation. Case was uneventful and patient returned to SICU intubated and on vasopressor support. He was extubated and weaned off vasopressor support shortly after return to SICU.    On 1/14 had episode of PEA arrest, ACLS performed with ROSC 7 min with shockx2. Intubated, hypotensive, found to have saddle PE. Received heparin bolus, continued on therapeutic A/C.     Allergies:  coconut (Anaphylaxis)  morphine (Pruritus; Rash)    MEDICATIONS:   --------------------------------------------------------------------------------------  Neurologic Medications  HYDROmorphone  Injectable 0.5 milliGRAM(s) IV Push every 3 hours PRN Breakthrough Pain  midazolam Injectable 2 milliGRAM(s) IV Push every 2 hours PRN agitation    Respiratory Medications    Cardiovascular Medications  aMIOdarone Infusion 0.5 mG/Min IV Continuous <Continuous>    Gastrointestinal Medications  dextrose 5% + sodium chloride 0.9%. 1000 milliLiter(s) IV Continuous <Continuous>  pantoprazole  Injectable 40 milliGRAM(s) IV Push daily    Genitourinary Medications    Hematologic/Oncologic Medications  heparin  Infusion 1000 Unit(s)/Hr IV Continuous <Continuous>    Antimicrobial/Immunologic Medications  ciprofloxacin   IVPB 400 milliGRAM(s) IV Intermittent every 24 hours  fluconAZOLE IVPB 400 milliGRAM(s) IV Intermittent every 24 hours  meropenem  IVPB 500 milliGRAM(s) IV Intermittent every 24 hours    Endocrine/Metabolic Medications  methylPREDNISolone sodium succinate Injectable 4 milliGRAM(s) IV Push daily    Topical/Other Medications  brimonidine 0.2% Ophthalmic Solution 1 Drop(s) Both EYES three times a day  chlorhexidine 0.12% Liquid 15 milliLiter(s) Oral Mucosa <User Schedule>  chlorhexidine 2% Cloths 1 Application(s) Topical <User Schedule>  latanoprost 0.005% Ophthalmic Solution 1 Drop(s) Both EYES daily  petrolatum Ophthalmic Ointment 1 Application(s) Both EYES daily    --------------------------------------------------------------------------------------    VITAL SIGNS, INS/OUTS (last 24 hours):  --------------------------------------------------------------------------------------  T(C): 36.8 (01-15-20 @ 23:00), Max: 37.6 (01-15-20 @ 18:10)  HR: 83 (01-16-20 @ 02:00) (67 - 85)  BP: 150/61 (01-15-20 @ 19:00) (150/61 - 150/61)  ABP: 160/64 (01-16-20 @ 02:00) (122/52 - 169/71)  ABP(mean): 94 (01-16-20 @ 02:00) (75 - 107)  RR: 16 (01-16-20 @ 02:00) (16 - 24)  SpO2: 100% (01-16-20 @ 02:00) (99% - 100%)  Wt(kg): --  CVP(mm Hg): --      01-14 @ 07:01  -  01-15 @ 07:00  --------------------------------------------------------  IN:    amiodarone Infusion: 217.1 mL    amiodarone Infusion: 183.7 mL    fentaNYL Infusion.: 33.6 mL    heparin Infusion: 216 mL    LORazepam  Infusion: 16 mL    Other: 600 mL    sodium chloride 0.9%: 1200 mL    Solution: 300 mL  Total IN: 2766.4 mL    OUT:    Indwelling Catheter - Urethral: 50 mL    Nasoenteral Tube: 300 mL    Other: 600 mL    VAC (Vacuum Assisted Closure) System: 50 mL  Total OUT: 1000 mL    Total NET: 1766.4 mL      01-15 @ 07:01  -  01-16 @ 03:03  --------------------------------------------------------  IN:    amiodarone Infusion: 317.3 mL    dextrose 5% + sodium chloride 0.9%.: 600 mL    heparin Infusion: 154 mL    Other: 650 mL    sodium chloride 0.9%: 350 mL    Solution: 300 mL  Total IN: 2371.3 mL    OUT:    Bulb: 160 mL    Nasoenteral Tube: 50 mL    Other: 2650 mL    VAC (Vacuum Assisted Closure) System: 20 mL  Total OUT: 2880 mL  Total NET: -508.7 mL  --------------------------------------------------------------------------------------    METABOLIC/FLUIDS/ELECTROLYTES:   dextrose 5% + sodium chloride 0.9%. 1000 milliLiter(s) IV Continuous <Continuous>      LABS:   --------------------------------------------------------------------------------------  CBC (01-16 @ 00:39)                              9.4<L>                         27.07<H>  )----------------(  358        --    % Neutrophils, --    % Lymphocytes, ANC: --                                  29.3<L>              CBC (01-15 @ 16:11)                              7.6<L>                         20.49<H>  )----------------(  372        --    % Neutrophils, --    % Lymphocytes, ANC: --                                  23.8<L>                BMP (01-16 @ 00:39)             137     |  97      |  15    		Ca++ --      Ca 8.4                ---------------------------------( 86    		Mg 2.2                3.6     |  25      |  3.91<H>			Ph 3.3     BMP (01-15 @ 16:11)             138     |  97      |  24<H> 		Ca++ --      Ca 7.9<L>             ---------------------------------( 90    		Mg 2.3                3.9     |  22      |  5.16<H>			Ph 4.5       LFTs (01-16 @ 00:39)      TPro 5.6<L> / Alb 2.7<L> / TBili 1.8<H> / DBili 1.0<H> / AST 18 / ALT 8<L> / AlkPhos 195<H>  LFTs (01-15 @ 16:11)      TPro 4.8<L> / Alb 2.4<L> / TBili 0.7 / DBili 0.4<H> / AST 18 / ALT 6<L> / AlkPhos 182<H>    Coags (01-16 @ 00:39)  aPTT 44.0<H> / INR 1.37<H> / PT 15.8<H>  Coags (01-15 @ 16:11)  aPTT 60.3<H> / INR 1.21<H> / PT 13.9<H>    ABG (01-15 @ 09:59)     7.44 / 38 / 141<H> / 26 / 1.9 / 99<H>%     Lactate:     ABG (01-15 @ 08:06)     7.42 / 39 / 130<H> / 25 / .9 / 99<H>%     Lactate:         -> .Tissue Other Culture (01-10 @ 06:07)       No polymorphonuclear cells seen per low power field  No organisms seen per oil power field    Enterobacter cloacae (Carbapenem Resistant)    No growth at 5 days    -> .Blood Blood-Venous Culture (01-07 @ 13:23)     NG    NG    No growth at 5 days.    -> .Urine Catheterized Culture (01-07 @ 00:18)     NG    NG    <10,000 CFU/mL Normal Urogenital Gillian    -> Abdominal Fl Abdominal Fluid Culture (01-06 @ 21:03)       No polymorphonuclear leukocytes seen  No organisms seen  by cytocentrifuge    NG    No growth at 5 days

## 2020-01-16 NOTE — PROGRESS NOTE ADULT - PROBLEM SELECTOR PLAN 1
s/p DDRT (12/8/19) now s/p transplant nephrectomy. Hospital course complicated yesterday by multiple cardiac events 2/2 saddle PE. Pt. now intubated and being followed closely by 8ICU team. Pt. underwent HD treatment (with 2L UF and blood transfusion) yesterday, tolerated well. Monitor hemodynamics closely

## 2020-01-16 NOTE — PROGRESS NOTE ADULT - ATTENDING COMMENTS
49 yr old man with ESRD s/p DDRT in Dec 2019 complicated by DGF due to ATN and early TMA thought to be due to prograf. Was on belatacept/MMF/Pred immunosuppression.   He was admitted with influenza, course complicated by perinephric hematoma complicated by enterobacter infection. s/p surgical exploration and wash out. He developed DVT started on heparin drip, subsequent gross hematuria. He was taken to the OR and found to have dehiscence of the ureter-bladder anastomosis as well as vascular anastomosis. He underwent graft nephrectomy on 1/10/20 with bovine biopatch repair of the EIA. Was maintained on antibiotics with meropenem, cipro and diflucan. On 1/13/20 he had cardiac arrest due to large pulmonary embolism s/p successful resuscitation.     Yesterday US revealed large RLQ collection s/p bedside drainage ~ 200cc, drain placed with additional ~ 150cc output.   HD performed yesterday, given 1 unit PRBC, tolerated well.   Repeat Echo yesterday showed normal LV function, right heart enlargement and strain slightly improved from prior.   He remains on the vent, on spontaneous breathing trial , plan for extubation today.     Hemodynamically stable. Awake and followed command, on vent.   Lungs fair air entry b/l, abdomen distended, vac present in RLQ, scrotal and b/l LE edema improving. Pedal pulses palpable.     Labs reviewed. Hgb 9. 4, WBC 30  CXR reviewed - congestion slightly improved today       - HD tomorrow per primary nephrologist   - F/u fluid culture. Continue antibiotics - meropenem, cipro and diflucan   - Continue heparin drip

## 2020-01-16 NOTE — PROGRESS NOTE ADULT - SUBJECTIVE AND OBJECTIVE BOX
Follow Up:  infected hematoa    Interval History: low grade fever this AM. no noted diarrhea. intubated but awake and alert    REVIEW OF SYSTEMS  [ x ] ROS unobtainable because:  intubated  [  ] All other systems negative except as noted below    Constitutional:  [ ] fever [ ] chills  [ ] weight loss  [ ] weakness  Skin:  [ ] rash [ ] phlebitis	  Eyes: [ ] icterus [ ] pain  [ ] discharge	  ENMT: [ ] sore throat  [ ] thrush [ ] ulcers [ ] exudates  Respiratory: [ ] dyspnea [ ] hemoptysis [ ] cough [ ] sputum	  Cardiovascular:  [ ] chest pain [ ] palpitations [ ] edema	  Gastrointestinal:  [ ] nausea [ ] vomiting [ ] diarrhea [ ] constipation [ ] pain	  Genitourinary:  [ ] dysuria [ ] frequency [ ] hematuria [ ] discharge [ ] flank pain  [ ] incontinence  Musculoskeletal:  [ ] myalgias [ ] arthralgias [ ] arthritis  [ ] back pain  Neurological:  [ ] headache [ ] seizures  [ ] confusion/altered mental status    Allergies  coconut (Anaphylaxis)  morphine (Pruritus; Rash)        ANTIMICROBIALS:  ciprofloxacin   IVPB 400 every 24 hours  fluconAZOLE IVPB 400 every 24 hours  meropenem  IVPB 500 every 24 hours      OTHER MEDS:  MEDICATIONS  (STANDING):  aMIOdarone Infusion 0.5 <Continuous>  heparin  Infusion 1000 <Continuous>  HYDROmorphone  Injectable 0.5 every 3 hours PRN  methylPREDNISolone sodium succinate Injectable 4 daily  pantoprazole  Injectable 40 daily      Vital Signs Last 24 Hrs  T(C): 37.6 (16 Jan 2020 12:00), Max: 38 (16 Jan 2020 03:00)  T(F): 99.7 (16 Jan 2020 12:00), Max: 100.4 (16 Jan 2020 03:00)  HR: 80 (16 Jan 2020 16:00) (70 - 89)  BP: 150/61 (15 [place] 2020 19:00) (150/61 - 150/61)  BP(mean): 92 (15 [place] 2020 19:00) (92 - 92)  RR: 23 (16 Jan 2020 16:00) (11 - 24)  SpO2: 93% (16 Jan 2020 16:00) (74% - 100%)    PHYSICAL EXAMINATION:  General: Intubated and Sedated  HEENT: +ETT  Neck: Supple  Cardiac: RRR, No M/R/G  Resp: CTAB, No Wh/Rh/Ra  Abdomen: +RLQ wound vac over transplant nephrectomy site, NBS, tenderness to palpation over  MSK: No LE edema. No Calf tenderness  : +martinez  Skin: No rashes or lesions. Skin is warm and dry to the touch.   Neuro: Intubated and Sedated  Psych: Unable to assess - intubated and sedated                        8.7    33.21 )-----------( 354      ( 16 Jan 2020 13:49 )             27.3       01-16    135  |  96  |  18  ----------------------------<  129<H>  3.8   |  24  |  4.71<H>    Ca    8.0<L>      16 Jan 2020 13:49  Phos  3.9     01-16  Mg     2.1     01-16    TPro  5.4<L>  /  Alb  2.4<L>  /  TBili  1.3<H>  /  DBili  0.8<H>  /  AST  15  /  ALT  <5<L>  /  AlkPhos  176<H>  01-16          MICROBIOLOGY:    .Body Fluid Abdominal Fluid  01-15-20   Testing in progress  --    polymorphonuclear leukocytes seen  No organisms seen  by cytocentrifuge      .Tissue Other  01-10-20   No growth at 5 days  --  Enterobacter cloacae (Carbapenem Resistant)      .Blood Blood-Venous  01-07-20   No growth at 5 days.  --  --      .Urine Catheterized  01-07-20   <10,000 CFU/mL Normal Urogenital Gillian  --  --      Abdominal Fl Abdominal Fluid  01-06-20   No growth at 5 days  --    No polymorphonuclear leukocytes seen  No organisms seen  by cytocentrifuge      .Urine Clean Catch (Midstream)  01-05-20   >100,000 CFU/ml Enterobacter cloacae  --  Enterobacter cloacae      .Blood Blood  01-04-20   No growth at 5 days.  --  --      .Tissue Other  01-01-20   Numerous Enterobacter cloacae complex  --  Enterobacter cloacae complex      .Surgical Swab collection around right kidney  01-01-20   Moderate Enterobacter cloacae complex  --  Enterobacter cloacae complex      .Body Fluid periphrenic collection  01-01-20   **Please Note**: This is a Corrected Report**  Moderate Enterobacter cloacae complex  Moderate Escherichia coli  Previously reported as:  Moderate Enterobacter cloacae (Carbapenem Resistant)  Moderate Escherichia coli  --  Escherichia coli  Enterobacter cloacae complex      .Surgical Swab right kidney abscess  01-01-20   Moderate Enterobacter cloacae complex  --  Enterobacter cloacae complex      .Urine Clean Catch (Midstream)  12-29-19   >100,000 CFU/ml Enterobacter cloacae  --  Enterobacter cloacae      .Blood Blood-Peripheral  12-27-19   No growth at 5 days.  --  --          CMVPCR Log: NotDetec [id number]/mL (01-14 @ 08:37)        RADIOLOGY:    <The imaging below has been reviewed and visualized me independently>      EXAM:  XR CHEST PORTABLE ROUTINE 1V                        PROCEDURE DATE:  01/16/2020    Unchanged right lower lung patchy opacity.  Probable small left pleural effusion.    EXAM:  US ABDOMEN LIMITED                        PROCEDURE DATE:  01/15/2020    13.7 x 5.0 x 1.5 cm fluid collection with complexity in the right lower quadrant transplant bed.

## 2020-01-16 NOTE — PROGRESS NOTE ADULT - SUBJECTIVE AND OBJECTIVE BOX
[facility] - Melissa Kelsey, Enrique, Maximino, Marin, Ravi Goodson  Office Number:  [phone]    Patient resting comfortably in bed in NAD.  Intubated, though awake and alert  on amiodarone and heparin gtt at current time.  no significant ectopy noted    ROS: negative unless otherwise mentioned.    Telemetry:sr      MEDICATIONS  (STANDING):  aMIOdarone Infusion 0.5 mG/Min (16.667 mL/Hr) IV Continuous <Continuous>  brimonidine 0.2% Ophthalmic Solution 1 Drop(s) Both EYES three times a day  chlorhexidine 0.12% Liquid 15 milliLiter(s) Oral Mucosa <User Schedule>  chlorhexidine 2% Cloths 1 Application(s) Topical <User Schedule>  ciprofloxacin   IVPB 400 milliGRAM(s) IV Intermittent every 24 hours  dextrose 5% + sodium chloride 0.9%. 1000 milliLiter(s) (50 mL/Hr) IV Continuous <Continuous>  fluconAZOLE IVPB 400 milliGRAM(s) IV Intermittent every 24 hours  heparin  Infusion 1000 Unit(s)/Hr (10 mL/Hr) IV Continuous <Continuous>  latanoprost 0.005% Ophthalmic Solution 1 Drop(s) Both EYES daily  meropenem  IVPB 500 milliGRAM(s) IV Intermittent every 24 hours  methylPREDNISolone sodium succinate Injectable 4 milliGRAM(s) IV Push daily  pantoprazole  Injectable 40 milliGRAM(s) IV Push daily  petrolatum Ophthalmic Ointment 1 Application(s) Both EYES daily    MEDICATIONS  (PRN):  HYDROmorphone  Injectable 0.5 milliGRAM(s) IV Push every 3 hours PRN Breakthrough Pain  midazolam Injectable 2 milliGRAM(s) IV Push every 2 hours PRN agitation      Allergies    coconut (Anaphylaxis)  morphine (Pruritus; Rash)    Intolerances        Vital Signs Last 24 Hrs  T(C): 37.5 (16 Jan 2020 08:00), Max: 38 (16 Jan 2020 03:00)  T(F): 99.5 (16 Jan 2020 08:00), Max: 100.4 (16 Jan 2020 03:00)  HR: 78 (16 Jan 2020 08:25) (70 - 89)  BP: 150/61 (15 [place] 2020 19:00) (150/61 - 150/61)  BP(mean): 92 (15 [place] 2020 19:00) (92 - 92)  RR: 15 (16 Jan 2020 08:00) (11 - 24)  SpO2: 100% (16 Jan 2020 08:25) (74% - 100%)    I&O's Summary    15 [place] 2020 07:01  -  16 Jan 2020 07:00  --------------------------------------------------------  IN: 2959.8 mL / OUT: 2935 mL / NET: 24.8 mL        ON EXAM:    Constitutional: NAD,   HEENT: +ETT in place  Pulmonary: Decreased breath sounds b/l. No rales, crackles or wheeze appreciated.   Cardiovascular: Regular, S1 and S2, No murmurs, rubs, gallops or clicks  Gastrointestinal: Bowel Sounds present, soft, nontender.   Lymph: No peripheral edema. No lymphadenopathy.  Skin: No visible rashes or ulcers.  Psych:  Mood & affect appropriate for situation    LABS: All Labs Reviewed:                        9.3    31.20 )-----------( 368      ( 16 Jan 2020 07:55 )             27.9                         9.4    27.07 )-----------( 358      ( 16 Jan 2020 00:39 )             29.3                         7.6    20.49 )-----------( 372      ( 15 [place] 2020 16:11 )             23.8     16 Jan 2020 07:55    138    |  96     |  17     ----------------------------<  104    3.9     |  23     |  4.40   16 Jan 2020 00:39    137    |  97     |  15     ----------------------------<  86     3.6     |  25     |  3.91   15 [place] 2020 16:11    138    |  97     |  24     ----------------------------<  90     3.9     |  22     |  5.16     Ca    7.9        16 Jan 2020 07:55  Ca    8.4        16 Jan 2020 00:39  Ca    7.9        15 Jan 2020 16:11  Phos  3.5       16 Jan 2020 07:55  Phos  3.3       16 Jan 2020 00:39  Phos  4.5       15 Jan 2020 16:11  Mg     2.1       16 Jan 2020 07:55  Mg     2.2       16 Jan 2020 00:39  Mg     2.3       15 Jan 2020 16:11    TPro  5.1    /  Alb  2.4    /  TBili  1.5    /  DBili  1.0    /  AST  17     /  ALT  5      /  AlkPhos  179    16 Jan 2020 07:55  TPro  5.6    /  Alb  2.7    /  TBili  1.8    /  DBili  1.0    /  AST  18     /  ALT  8      /  AlkPhos  195    16 Jan 2020 00:39  TPro  4.8    /  Alb  2.4    /  TBili  0.7    /  DBili  0.4    /  AST  18     /  ALT  6      /  AlkPhos  182    15 Jan 2020 16:11    PT/INR - ( 16 Jan 2020 07:55 )   PT: 15.0 sec;   INR: 1.31 ratio         PTT - ( 16 Jan 2020 07:55 )  PTT:61.6 sec      Blood Culture: Organism --  Gram Stain Blood -- Gram Stain   polymorphonuclear leukocytes seen  No organisms seen  by cytocentrifuge  Specimen Source .Body Fluid Abdominal Fluid  Culture-Blood --

## 2020-01-16 NOTE — PROGRESS NOTE ADULT - SUBJECTIVE AND OBJECTIVE BOX
Seen in SICU, extubated    Vital Signs Last 24 Hrs  T(C): 37.6 (01-16-20 @ 12:00), Max: 38 (01-16-20 @ 03:00)  T(F): 99.7 (01-16-20 @ 12:00), Max: 100.4 (01-16-20 @ 03:00)  HR: 77 (01-16-20 @ 12:00) (70 - 89)  BP: 150/61 (01-15-20 @ 19:00) (150/61 - 150/61)  BP(mean): 92 (01-15-20 @ 19:00) (92 - 92)  RR: 20 (01-16-20 @ 12:00) (11 - 24)  SpO2: 93% (01-16-20 @ 12:00) (74% - 100%)    Card S1S2  Lungs coarse BS b/l  Abd soft  Extr + edema b/l LE, AVF patent                                                                                                                    8.7    33.21 )-----------( 354      ( 16 Jan 2020 13:49 )             27.3     16 Jan 2020 13:49    135    |  96     |  18     ----------------------------<  129    3.8     |  24     |  4.71     Ca    8.0        16 Jan 2020 13:49  Phos  3.9       16 Jan 2020 13:49  Mg     2.1       16 Jan 2020 13:49    TPro  5.4    /  Alb  2.4    /  TBili  1.3    /  DBili  0.8    /  AST  15     /  ALT  <5     /  AlkPhos  176    16 Jan 2020 13:49    LIVER FUNCTIONS - ( 16 Jan 2020 13:49 )  Alb: 2.4 g/dL / Pro: 5.4 g/dL / ALK PHOS: 176 U/L / ALT: <5 U/L / AST: 15 U/L / GGT: x           PT/INR - ( 16 Jan 2020 13:49 )   PT: 15.3 sec;   INR: 1.32 ratio      aMIOdarone Infusion 0.5 mG/Min IV Continuous <Continuous>  brimonidine 0.2% Ophthalmic Solution 1 Drop(s) Both EYES three times a day  chlorhexidine 2% Cloths 1 Application(s) Topical <User Schedule>  ciprofloxacin   IVPB 400 milliGRAM(s) IV Intermittent every 24 hours  dextrose 5% + sodium chloride 0.9%. 1000 milliLiter(s) IV Continuous <Continuous>  fluconAZOLE IVPB 400 milliGRAM(s) IV Intermittent every 24 hours  heparin  Infusion 1000 Unit(s)/Hr IV Continuous <Continuous>  HYDROmorphone  Injectable 0.5 milliGRAM(s) IV Push every 3 hours PRN  latanoprost 0.005% Ophthalmic Solution 1 Drop(s) Both EYES daily  meropenem  IVPB 500 milliGRAM(s) IV Intermittent every 24 hours  methylPREDNISolone sodium succinate Injectable 4 milliGRAM(s) IV Push daily  pantoprazole  Injectable 40 milliGRAM(s) IV Push daily  petrolatum Ophthalmic Ointment 1 Application(s) Both EYES daily    A/P:    Adm w/Flu A 12/27/19  Hx ESRD on HD  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, DGF  S/p transplant kidney bx 12/13: ATN, focal TMA  S/p PLEX 12/13, 12/15; d/c-d 12/20 w/op HD 3 x wk  Re-adm 12/27, dx w/infected carlos-nephric hematoma   S/p OR 1/1 for washout and repeat renal graft biopsy  Repeat renal bx c/w ATN  Dx w/RLE DVT  Developed gross hematuria  S/p OR 1/10 for infected carlos-nephric hematoma, hemorrhage involving pseudo-aneurysm of anastomosis of renal artery to external iliac vein,   s/p transplant nephrectomy, s/p IVCF   S/p PEA arrest 1/13, intubated, s/p CTA, + PE, R retrop collection, on AC  Abx per ID, off pressors  S/p IR drainage of abd collection  S/p stable HD yesterday, 2 kg fluid removed  Extubated today  Next HD in am  Fluid removal as tolerates  D/w transplant team

## 2020-01-16 NOTE — PROGRESS NOTE ADULT - ASSESSMENT
49y Male with ESRD s/p DDRT on 12/8/19 with course complicated by delayed graft function requiring HD, and infected perinephric hematoma s/p washout on 1/1/2020 with worsening sepsis and transfer to ICU, s/p IR drainage of perinephric collection on 1/6. 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 He underwent operative repair of right external iliac artery with bovine patch, transplant nephrectomy, RLE thrombectomy, and IVC filter on 1/10. Post-op course complicated by cardiac arrest secondary to saddle PE.    PLAN:  Neuro: intubated, acute post-op pain  - PRN Dilaudid. Appropriately responsive with no mental decline.    Resp: acute hypoxemic respiratory failure, saddle PE  Mode: AC/ CMV (Assist Control/ Continuous Mandatory Ventilation): 14/450/5/30%  ABG - pH, Arterial: 7.44  pH, Blood: x     /  pCO2: 38    /  pO2: 141   / HCO3: 26    / Base Excess: 1.9   / SaO2: 99        CV: obstructive shock secondary to PE, bradycardia -> PEA arrest -> torsades  - Amiodarone infusion for ventricular tachycardia.  - Troponins downtrending.   - Daily EKGs while on amiodarone.   - Follow up official echo.     GI: no acute issues  - NPO while intubated for now  - Protonix for stress ulcer prophylaxis    Renal: CKD stage V s/p DDRT c/b DGF & perinephric collection, s/p explantation of DDRT due to pseudoaneurysm of right external iliac and transplanted renal artery  - S/p percutaneous drainage of R iliac fossa fluid collection by IR today with 10 Fr catheter left in place.  - Monitor I&Os  - Monitor electrolytes and replete as necessary  - NS at 50 mL/hr while NPO and on vasopressor support  - Appreciate nephrology recommendations, hemodialysis as per nephrology    Heme: right external iliac & femoral DVT s/p thrombectomy & IVC filter, saddle PE  - Monitor CBC and coags  - Heparin infusion for saddle PE  - Appreciate PERT, vascular cardiology, and CT surgery recommendations    ID: infected perinephric hematoma  - Monitor WBC, temperature, and procalcitonin  - Bladder hematoma & RP hematoma on 1/10 with carbapenem resistant Enterobacter cloacae  - Empiric antibiotics with fluconazole, ciprofloxacin, and meropenem    Endo: no acute issues  - Methylprednisolone 4mg daily stress dose.     Misc:  - Brimonidine/latanoprost drops    Disposition:  - Full code  - Will remain in SICU

## 2020-01-16 NOTE — PROGRESS NOTE ADULT - ASSESSMENT
49M PMHx of ESRD on HD  ( via LUE AVF), HTN, Gout, Glaucoma, NET of pancreas, RA with hand contractures. s/p DDRT 12/8/19,  Post op course c/b DGF requiring HD. Renal bx x 2 c/w profound ATN.     Re-admitted with Influenza and perinephric hematoma. s/p hematoma evacuation/ abd washout and renal biopsy. Post op course c/b leukocytosis, infected hematoma, acute RLE DVT, and bleeding pseudoaneurysm at txp renal artery anastomosis. Now s/p graft nephrectomy. Course further complicated by saddle PE causing right heart strain and hemodynamic instability leading to cardiac arrest requiring ACLS, now with ROSC and intubated    [] DDRT c/b DGF/ATN/perinephric infected hematoma/pseudoneurysm/graft nephrectomy   - HD per nephrology team  - s/p bedside IR drainage of RLQ fluid collection (1/15), cx pending   - off immunosupression, decrease prednisone to 2.5mg QD and will plan to DC soon  - off  valcyte/bactrim  - Worsening leukocytosis: ID following; on zora/cipro/fluc.   - received amikacin 1/7 and 1/8   - pain control  - bowel regimen  - PT/OT/OOB  - Wound vac changed 1/15     [] Saddle PE, with right heart strain and complicated by PEA arrest followed by Torsade, ROSC achieved after ACLS  - vascular, CT and vascular cardiology following   - continue anticoagulation and no plan for invasive intervention   - repeat Echo reviewed     [] New acute DVT of R external iliac, common femoral, femoral veins, R soleal vein requiring heparin drip  - IVC filter placed 1/9, on heparin drip     [] Dispo  - continue SICU care

## 2020-01-16 NOTE — PROGRESS NOTE ADULT - ASSESSMENT
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 Now with infected carlos-transplant hematoma.    RVP with Influenza A. s/p Tamiflu treatment course    1/1: s/p Ex-Lap and washout of infected hematoma (Enterobacter from cultures)  1/6: s/p IR guided drainage attempt of hematoma on 1/6 (NGTD from cultures  1/10: s/p transplant nephrectomy, repair of right external iliac artery with a bovine pericardial patch, repair of right external iliac vein and thrombectomy of right lower extremity veins.   1/10: Surgical cultures with Enterobacter  1/15: s/p IR guided drainage (220cc of hematoma)    Some of the Enterobacter isolates R and S to Ertapenem  per core lab Ertapenem at borderline of Susceptible and Resistant breakpoint    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   I suspect Enterobacter is the primary pathogen  Ciprofloxacin as monotherapy should be sufficient therapy for Enterobacter (all isolates susceptible)    PEA arrest and Torsades on 1/13  CT C/A/P (1/13) with Saddle PE, interval increase in size of RP fluid collection (infected hematoma)    Low grade fever today (after IR intervention yesterday)  Given increase in WBC today (likely from PE and hematoma) favor obtaining blood cultures and giving dose of Vancomycin pending BCx    Overall, Saddle pulmonary embolus, Leukocytosis (increasing), Infected Hematoma, Positive Urine Culture, Influenza Infection, Fever, Renal Transplant Recipient    --Continue Meropenem 500 mg IV Q24H  --Can consider discontinuing Ciprofloxacin  --Would discontinue fluconazole - no evidence of yeast on cultures  --Continue to follow CBC with diff  --Continue to follow temperature curve  --Follow up on preliminary surgical cultures   --Management of Saddle PE per 8ICU team and vascular teams    I will continue to follow. Please feel free to contact me with any further questions.    Dada Jacobo M.D.  [facility] Division of Infectious Disease  8AM-5PM: Pager Number [phone]  After Hours (or if no response): Please contact the Infectious Diseases Office at [phone]

## 2020-01-16 NOTE — PROGRESS NOTE ADULT - SUBJECTIVE AND OBJECTIVE BOX
Transplant Surgery - Multidisciplinary Rounds  --------------------------------------------------------------  R DDRT    Date:  12/8/19  Dr. Espinal, readmitted 12/27/19 with +Flu/perinephretic collection  Ex Lap washout/ renal bx  Date:  1/1/20    Present:   Patient seen with multidisciplinary team including Transplant Surgeon: Dr. Cruz, Dr. Be, Dr. Espinal, Transplant Nephrologist: Dr. Irvin, renal fellow, Pharmacist: Debbi Ortiz, PGY 3 Dr. Bess, NA/PA: Charmaine Hunt,, PA student, MED student, and pharmacy student during am rounds and examined with Dr. Cruz.  Disciplines not in attendance will be notified of the plan.     HPI: 49M PMHx of ESRD on HD  ( via LUE AVF), HTN, Gout, Glaucoma, NET of pancreas (s/p robotic distal panc/splenectomy at [place] 2015 by Dr. Young, followed by Dr. Raphael, [facility] Oncologist), RA with hand contractures. He underwent DDRT on 12/8/19. Post op course c/b DGF requiring HD, thrombocytopenia, elevated LDH and Haptoglobulin. Schistocytes  on peripheral smear concerning for TMA. Envarsus held. Received Belatacept 12/13. Started on PLEX (12/12, 12/15). Underwent renal bx on 12/13 c/w profound ATN.  Discharged home on 12/20/19 w/ outpatient HD.     Re-admitted 12/27 with influenza, completed treatement with Tamiflu on 12/31. Urine cx on admission grew Ent cloacae, was started on meropenem 12/31. Renal US on admission with increasing hematoma.   ·	OR 1/1 for ex-lap, hematoma evacuation, washout and renal bx. (c/w ATN)  OR cxs grew CRE and e coli. Post op course c/b:   ·	fevers and leukocytosis, CT a/p (1/4) with increased perinephric collection. Underwent IR guided collection 1/6   ·	New onset hematuria, martinez inserted 1/7, started CBI  ·	RLE edema, RLE doppler (1/8) with acute above the knee thrombus of R external iliac, common femoral and femoral veins, Acute calf vein thrombus affecting R soleal vein, Heparin gtt initiated   ·	AMS 1/9  (head CT neg), hyponatremic  ·	Significant hematuria and bleeding around the martinez. Angio 1/9 showed actively bleeding pseudoaneurysm at the anastomosis of the transplant renal artery with the recipient iliac artery. A stent was placed in the iliac artery to control hemorrhage. IVC filter placed.   ·	Emergent OR 1/9-1/10 for graft nephrectomy;  ureter to bladder anastomosis was completely disrupted, repaired the R external iliac artery with bovine pericardial patch, repaired right external iliac vein, performed thrombectomy of RLE veins, Intra op received 3u PRBC.   ·	Extubated 1/11  ·	1/13 Saddle PE, with right heart strain and complicated by PEA arrest followed by Torsade, ROSC achieved after ACLS; re-intubated  ·	1/15 Renal US with 13.7 x 5.0 x 1.5 cm in RLQ transplant bed; s/p bedside IR guided drainage, pigtail in place.     Interval events:    - Intubated; on SBT (tolerating), awake and alert  - on amiodarone and heparin gtt   - s/p bedside IR drainage of RLQ collection yesterday, drain in place with SS drainage (190cc);  wound vac changed (1/15)   - rising leukocytosis, wbc 31. Low grade fever overnight 38. Remains on fluc/zora/cipro. Fluid cx from 1/15 pending     cxs:   12/29: Urine Cx: Enterobacter Cloacae  1/1 OR Perinephric collection Cx:  Carbapenem resistant Ent Cloacae, E. Coli  1/1: R kidney abscess swab: Enterobacter Cloacae  1/1  OR tissue: Enterobacter Cloacae  1/5: Urine Cx: Enteropbacter Cloacae  1/5: Skin incision Cx (bedside): NG  1/6: Adominal fluid Cx from IR:  NG   1/7: Urine Cx:  NG  1/7: BCX2:  NG  1/10 retroperit hematoma - enerobacter   1/10 bladder hematoma - enterobacter  1/15 Drainage fluid - pending     Potential Discharge date: pending clinical improvement    MEDICATIONS  (STANDING):  aMIOdarone Infusion 0.5 mG/Min (16.667 mL/Hr) IV Continuous <Continuous>  brimonidine 0.2% Ophthalmic Solution 1 Drop(s) Both EYES three times a day  chlorhexidine 0.12% Liquid 15 milliLiter(s) Oral Mucosa <User Schedule>  chlorhexidine 2% Cloths 1 Application(s) Topical <User Schedule>  ciprofloxacin   IVPB 400 milliGRAM(s) IV Intermittent every 24 hours  dextrose 5% + sodium chloride 0.9%. 1000 milliLiter(s) (50 mL/Hr) IV Continuous <Continuous>  fluconAZOLE IVPB 400 milliGRAM(s) IV Intermittent every 24 hours  heparin  Infusion 1000 Unit(s)/Hr (10 mL/Hr) IV Continuous <Continuous>  latanoprost 0.005% Ophthalmic Solution 1 Drop(s) Both EYES daily  meropenem  IVPB 500 milliGRAM(s) IV Intermittent every 24 hours  methylPREDNISolone sodium succinate Injectable 4 milliGRAM(s) IV Push daily  pantoprazole  Injectable 40 milliGRAM(s) IV Push daily  petrolatum Ophthalmic Ointment 1 Application(s) Both EYES daily    MEDICATIONS  (PRN):  HYDROmorphone  Injectable 0.5 milliGRAM(s) IV Push every 3 hours PRN Breakthrough Pain  midazolam Injectable 2 milliGRAM(s) IV Push every 2 hours PRN agitation      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  Carpal tunnel syndrome  Brain cyst: had MRI recently- now gone  Anemia  Gastric ulcer: Long time ago  Rheumatoid arthritis  Renal transplant recipient  Breakdown (mechanical) of penile (implanted) prosthesis, sequela  End-stage renal disease (ESRD): PERMACATH PLACEMENT  Abscess of left buttock: s/p I &amp; D  AV fistula: placement left lower arm  S/P cystoscopy: stent placement &amp; removal for kidney stones, lithiotripsy  s/p Lt Carpal tunnel: 2011      Vital Signs Last 24 Hrs  T(C): 37.5 (16 Jan 2020 08:00), Max: 38 (16 Jan 2020 03:00)  T(F): 99.5 (16 Jan 2020 08:00), Max: 100.4 (16 Jan 2020 03:00)  HR: 78 (16 Jan 2020 08:25) (70 - 89)  BP: 150/61 (15 [place] 2020 19:00) (150/61 - 150/61)  BP(mean): 92 (15 [place] 2020 19:00) (92 - 92)  RR: 15 (16 Jan 2020 08:00) (11 - 24)  SpO2: 100% (16 Jan 2020 08:25) (74% - 100%)    I&O's Summary    15 [place] 2020 07:01  -  16 Jan 2020 07:00  --------------------------------------------------------  IN: 2959.8 mL / OUT: 2935 mL / NET: 24.8 mL    16 Jan 2020 07:01  -  16 Jan 2020 09:58  --------------------------------------------------------  IN: 230.1 mL / OUT: 0 mL / NET: 230.1 mL                          9.3    31.20 )-----------( 368      ( 16 Jan 2020 07:55 )             27.9     01-16    138  |  96  |  17  ----------------------------<  104<H>  3.9   |  23  |  4.40<H>    Ca    7.9<L>      16 Jan 2020 07:55  Phos  3.5     01-16  Mg     2.1     01-16    TPro  5.1<L>  /  Alb  2.4<L>  /  TBili  1.5<H>  /  DBili  1.0<H>  /  AST  17  /  ALT  5<L>  /  AlkPhos  179<H>  01-16    Culture - Fungal, Body Fluid (collected 01-15-20 @ 22:02)  Source: .Body Fluid Abdominal Fluid  Preliminary Report (01-16-20 @ 08:50):    Testing in progress    Culture - Body Fluid with Gram Stain (collected 01-15-20 @ 22:02)  Source: .Body Fluid Abdominal Fluid  Gram Stain (01-16-20 @ 04:26):    polymorphonuclear leukocytes seen    No organisms seen    by cytocentrifuge    Culture - Tissue with Gram Stain (collected 01-10-20 @ 06:07)  Source: .Tissue 1. retroperitoneal hematoma  Gram Stain (01-10-20 @ 13:29):    Few polymorphonuclear leukocytes seen per low power field    No organisms seen per oil power field  Final Report (01-15-20 @ 09:26):    Rare Enterobacter cloacae (Carbapenem Resistant) complex  Organism: Enterobacter cloacae (Carbapenem Resistant) (01-15-20 @ 09:26)  Organism: Enterobacter cloacae (Carbapenem Resistant) (01-15-20 @ 09:26)    Culture - Tissue with Gram Stain (collected 01-10-20 @ 06:07)  Source: .Tissue 5. bladder hematoma  Gram Stain (01-10-20 @ 13:35):    No polymorphonuclear leukocytes seen per low power field    No organisms seen per oil power field  Preliminary Report (01-11-20 @ 11:20):    Rare Enterobacter cloacae complex  Organism: Enterobacter cloacae (Carbapenem Resistant) (01-13-20 @ 11:59)  Organism: Enterobacter cloacae (Carbapenem Resistant) (01-13-20 @ 11:59)    Culture - Tissue with Gram Stain (collected 01-10-20 @ 06:07)  Source: .Tissue Other  Gram Stain (01-10-20 @ 13:33):    Moderate polymorphonuclear leukocytes seen per low power field    No organisms seen per oil power field  Final Report (01-15-20 @ 07:45):    No growth at 5 days    Culture - Tissue with Gram Stain (collected 01-10-20 @ 06:07)  Source: .Tissue 3. right veinal artery  Gram Stain (01-10-20 @ 13:29):    Numerous polymorphonuclear leukocytes seen per low power field    No organisms seen per oil power field  Final Report (01-15-20 @ 07:31):    No growth at 5 days    Culture - Tissue with Gram Stain (collected 01-10-20 @ 06:07)  Source: .Tissue Other  Gram Stain (01-10-20 @ 15:41):    No polymorphonuclear cells seen per low power field    No organisms seen per oil power field  Final Report (01-15-20 @ 07:32):    No growth at 5 days      REVIEW OF SYSTEMS  RoS limited by patient's status of being sedated and on ventilator     PHYSICAL EXAM:    Constitutional: intubated, awake  	Eyes: Anicteric, PERRLA  	Respiratory: tolerating SBT  	Cardiovascular: RRR  	Gastrointestinal: Soft abdomen, appropriate incisional TTP, ND.  wound vac ss/murky output, RLQ drain with SS drainage    Genitourinary:  Martinez, anuric, residual hematuria in bag    Extremities: RLE edema   	Vascular: strongly palpable R DP, faintly palpable L DP.  2+ pitting edema RLE, 1+LLE . L AVF palpable  	Neurological: A&O x3. no confusion noted on exam  	Skin: no new lesions/ulcerations  	Musculoskeletal: Moving all extremities    Psychiatric: sedated, RASS - 1

## 2020-01-16 NOTE — PROGRESS NOTE ADULT - SUBJECTIVE AND OBJECTIVE BOX
VASCULAR SURGERY PROGRESS NOTE:    Subjective:  Patient is a 49y old  Male who presents with a chief complaint of Fever. sicu bedside ultrasound showed recapitulated large iliac fossa fluid collection, S/p percutaneous drainage by IR attending at the bedside. Approx 220 cc of dark red fluid aspirated and send for cultures. A 10 Fr drain was left in place, on pressor support.    Vital Signs Last 24 Hrs  T(C): 37.5 (2020 16:00), Max: 38 (2020 03:00)  T(F): 99.5 (2020 16:00), Max: 100.4 (2020 03:00)  HR: 85 (2020 18:00) (75 - 89)  BP: --  BP(mean): --  RR: 22 (2020 18:00) (11 - 24)  SpO2: 92% (2020 18:00) (74% - 100%)    I&O's Detail    15 [place] 2020 07:01  -  2020 07:00  --------------------------------------------------------  IN:    amiodarone Infusion: 400.8 mL    dextrose 5% + sodium chloride 0.9%.: 850 mL    heparin Infusion: 204 mL    Instillation: 5 mL    Other: 650 mL    sodium chloride 0.9%: 350 mL    Solution: 500 mL  Total IN: 2959.8 mL    OUT:    Bulb: 190 mL    Nasoenteral Tube: 75 mL    Other: 2650 mL    VAC (Vacuum Assisted Closure) System: 20 mL  Total OUT: 2935 mL    Physical Exam  Constitutional: NAD  Respiratory: INTUBATED  Abd: soft, distended, RL abdominal wound vac in place with appropriate suction   Ext: right LE edema resolving    Total NET: 24.8 mL      2020 07:01  -  2020 19:11  --------------------------------------------------------  IN:    amiodarone Infusion: 200.4 mL    dextrose 5% + sodium chloride 0.9%.: 600 mL    heparin Infusion: 126 mL    Solution: 450 mL  Total IN: 1376.4 mL    OUT:    Bulb: 160 mL  Total OUT: 160 mL    Total NET: 1216.4 mL          Daily     Daily Weight in k.9 (2020 06:10)    MEDICATIONS  (STANDING):  aMIOdarone Infusion 0.5 mG/Min (16.667 mL/Hr) IV Continuous <Continuous>  brimonidine 0.2% Ophthalmic Solution 1 Drop(s) Both EYES three times a day  chlorhexidine 2% Cloths 1 Application(s) Topical <User Schedule>  ciprofloxacin   IVPB 400 milliGRAM(s) IV Intermittent every 24 hours  dextrose 5% + sodium chloride 0.9%. 1000 milliLiter(s) (50 mL/Hr) IV Continuous <Continuous>  fluconAZOLE IVPB 400 milliGRAM(s) IV Intermittent every 24 hours  heparin  Infusion 1000 Unit(s)/Hr (12 mL/Hr) IV Continuous <Continuous>  latanoprost 0.005% Ophthalmic Solution 1 Drop(s) Both EYES daily  meropenem  IVPB 500 milliGRAM(s) IV Intermittent every 24 hours  methylPREDNISolone sodium succinate Injectable 4 milliGRAM(s) IV Push daily  pantoprazole  Injectable 40 milliGRAM(s) IV Push daily  petrolatum Ophthalmic Ointment 1 Application(s) Both EYES daily    MEDICATIONS  (PRN):  HYDROmorphone  Injectable 0.5 milliGRAM(s) IV Push every 3 hours PRN Breakthrough Pain      LABS:                        8.7    33.21 )-----------( 354      ( 2020 13:49 )             27.3     -    135  |  96  |  18  ----------------------------<  129<H>  3.8   |  24  |  4.71<H>    Ca    8.0<L>      2020 13:49  Phos  3.9     -  Mg     2.1         TPro  5.4<L>  /  Alb  2.4<L>  /  TBili  1.3<H>  /  DBili  0.8<H>  /  AST  15  /  ALT  <5<L>  /  AlkPhos  176<H>      PT/INR - ( 2020 13:49 )   PT: 15.3 sec;   INR: 1.32 ratio         PTT - ( 2020 13:49 )  PTT:52.6 sec

## 2020-01-16 NOTE — PROGRESS NOTE ADULT - SUBJECTIVE AND OBJECTIVE BOX
Interventional Radiology Follow- Up Note    49y Male s/p drainage of pelvic fluid collection on 1/15  in Interventional Radiology with Dr. Bro.       Pt extubated today.     Vitals: T(F): 99.7 (01-16-20 @ 12:00), Max: 100.4 (01-16-20 @ 03:00)  HR: 80 (01-16-20 @ 16:00) (75 - 89)  BP: 150/61 (01-15-20 @ 19:00) (150/61 - 150/61)  RR: 23 (01-16-20 @ 16:00) (11 - 24)  SpO2: 93% (01-16-20 @ 16:00) (74% - 100%)  Wt(kg): --    LABS:                        8.7    33.21 )-----------( 354      ( 16 Jan 2020 13:49 )             27.3     01-16    135  |  96  |  18  ----------------------------<  129<H>  3.8   |  24  |  4.71<H>    Ca    8.0<L>      16 Jan 2020 13:49  Phos  3.9     01-16  Mg     2.1     01-16    TPro  5.4<L>  /  Alb  2.4<L>  /  TBili  1.3<H>  /  DBili  0.8<H>  /  AST  15  /  ALT  <5<L>  /  AlkPhos  176<H>  01-16    PT/INR - ( 16 Jan 2020 13:49 )   PT: 15.3 sec;   INR: 1.32 ratio         PTT - ( 16 Jan 2020 13:49 )  PTT:52.6 sec      Culture - Fungal, Body Fluid (collected 01-15-20 @ 22:02)  Source: .Body Fluid Abdominal Fluid  Preliminary Report (01-16-20 @ 08:50):    Testing in progress    Culture - Body Fluid with Gram Stain (collected 01-15-20 @ 22:02)  Source: .Body Fluid Abdominal Fluid  Gram Stain (01-16-20 @ 04:26):    polymorphonuclear leukocytes seen    No organisms seen    by cytocentrifuge      PHYSICAL EXAM:  General: Nontoxic, in NAD  abdomen: drain with serosanguinous fluid. 190cc of documented output.     Impression: 48y/o M with hx of DDRT s/p hematoma evacuation/ abd washout and renal biopsy. Post op course c/b leukocytosis, infected hematoma, acute RLE DVT, and bleeding pseudoaneurysm at txp renal artery anastomosis. Now s/p graft nephrectomy with pelvic collection s/p drainage of collection.     Plan:  - Care per SICU team  - Continue to monitor out put.  - Flush drain per doctor orders.  - Trend vitals, labs.  - Will discuss with IR attending.     Please call IR at extension [phone] with any questions, concerns, or issues regarding above.

## 2020-01-17 LAB
ALBUMIN SERPL ELPH-MCNC: 2.2 G/DL — LOW (ref 3.3–5)
ALBUMIN SERPL ELPH-MCNC: 2.7 G/DL — LOW (ref 3.3–5)
ALP SERPL-CCNC: 178 U/L — HIGH (ref 40–120)
ALP SERPL-CCNC: 198 U/L — HIGH (ref 40–120)
ALT FLD-CCNC: 5 U/L — LOW (ref 10–45)
ALT FLD-CCNC: <5 U/L — LOW (ref 10–45)
ANION GAP SERPL CALC-SCNC: 11 MMOL/L — SIGNIFICANT CHANGE UP (ref 5–17)
ANION GAP SERPL CALC-SCNC: 16 MMOL/L — SIGNIFICANT CHANGE UP (ref 5–17)
APTT BLD: 77.1 SEC — HIGH (ref 27.5–36.3)
APTT BLD: 79.6 SEC — HIGH (ref 27.5–36.3)
AST SERPL-CCNC: 10 U/L — SIGNIFICANT CHANGE UP (ref 10–40)
AST SERPL-CCNC: 11 U/L — SIGNIFICANT CHANGE UP (ref 10–40)
BILIRUB DIRECT SERPL-MCNC: 0.9 MG/DL — HIGH (ref 0–0.2)
BILIRUB DIRECT SERPL-MCNC: 1.1 MG/DL — HIGH (ref 0–0.2)
BILIRUB INDIRECT FLD-MCNC: 0.4 MG/DL — SIGNIFICANT CHANGE UP (ref 0.2–1)
BILIRUB INDIRECT FLD-MCNC: 0.5 MG/DL — SIGNIFICANT CHANGE UP (ref 0.2–1)
BILIRUB SERPL-MCNC: 1.3 MG/DL — HIGH (ref 0.2–1.2)
BILIRUB SERPL-MCNC: 1.6 MG/DL — HIGH (ref 0.2–1.2)
BUN SERPL-MCNC: 10 MG/DL — SIGNIFICANT CHANGE UP (ref 7–23)
BUN SERPL-MCNC: 22 MG/DL — SIGNIFICANT CHANGE UP (ref 7–23)
CALCIUM SERPL-MCNC: 7.9 MG/DL — LOW (ref 8.4–10.5)
CALCIUM SERPL-MCNC: 8.6 MG/DL — SIGNIFICANT CHANGE UP (ref 8.4–10.5)
CHLORIDE SERPL-SCNC: 97 MMOL/L — SIGNIFICANT CHANGE UP (ref 96–108)
CHLORIDE SERPL-SCNC: 98 MMOL/L — SIGNIFICANT CHANGE UP (ref 96–108)
CO2 SERPL-SCNC: 21 MMOL/L — LOW (ref 22–31)
CO2 SERPL-SCNC: 27 MMOL/L — SIGNIFICANT CHANGE UP (ref 22–31)
CREAT SERPL-MCNC: 2.96 MG/DL — HIGH (ref 0.5–1.3)
CREAT SERPL-MCNC: 5.15 MG/DL — HIGH (ref 0.5–1.3)
GLUCOSE SERPL-MCNC: 108 MG/DL — HIGH (ref 70–99)
GLUCOSE SERPL-MCNC: 118 MG/DL — HIGH (ref 70–99)
HCT VFR BLD CALC: 26.4 % — LOW (ref 39–50)
HCT VFR BLD CALC: 28.5 % — LOW (ref 39–50)
HGB BLD-MCNC: 8.7 G/DL — LOW (ref 13–17)
HGB BLD-MCNC: 9.3 G/DL — LOW (ref 13–17)
INR BLD: 1.4 RATIO — HIGH (ref 0.88–1.16)
INR BLD: 1.48 RATIO — HIGH (ref 0.88–1.16)
MAGNESIUM SERPL-MCNC: 2 MG/DL — SIGNIFICANT CHANGE UP (ref 1.6–2.6)
MAGNESIUM SERPL-MCNC: 2 MG/DL — SIGNIFICANT CHANGE UP (ref 1.6–2.6)
MCHC RBC-ENTMCNC: 29.4 PG — SIGNIFICANT CHANGE UP (ref 27–34)
MCHC RBC-ENTMCNC: 29.9 PG — SIGNIFICANT CHANGE UP (ref 27–34)
MCHC RBC-ENTMCNC: 32.6 GM/DL — SIGNIFICANT CHANGE UP (ref 32–36)
MCHC RBC-ENTMCNC: 33 GM/DL — SIGNIFICANT CHANGE UP (ref 32–36)
MCV RBC AUTO: 90.2 FL — SIGNIFICANT CHANGE UP (ref 80–100)
MCV RBC AUTO: 90.7 FL — SIGNIFICANT CHANGE UP (ref 80–100)
NRBC # BLD: 0 /100 WBCS — SIGNIFICANT CHANGE UP (ref 0–0)
NRBC # BLD: 0 /100 WBCS — SIGNIFICANT CHANGE UP (ref 0–0)
PHOSPHATE SERPL-MCNC: 2.4 MG/DL — LOW (ref 2.5–4.5)
PHOSPHATE SERPL-MCNC: 3.8 MG/DL — SIGNIFICANT CHANGE UP (ref 2.5–4.5)
PLATELET # BLD AUTO: 352 K/UL — SIGNIFICANT CHANGE UP (ref 150–400)
PLATELET # BLD AUTO: 396 K/UL — SIGNIFICANT CHANGE UP (ref 150–400)
POTASSIUM SERPL-MCNC: 3.7 MMOL/L — SIGNIFICANT CHANGE UP (ref 3.5–5.3)
POTASSIUM SERPL-MCNC: 3.9 MMOL/L — SIGNIFICANT CHANGE UP (ref 3.5–5.3)
POTASSIUM SERPL-SCNC: 3.7 MMOL/L — SIGNIFICANT CHANGE UP (ref 3.5–5.3)
POTASSIUM SERPL-SCNC: 3.9 MMOL/L — SIGNIFICANT CHANGE UP (ref 3.5–5.3)
PROT SERPL-MCNC: 5.1 G/DL — LOW (ref 6–8.3)
PROT SERPL-MCNC: 5.9 G/DL — LOW (ref 6–8.3)
PROTHROM AB SERPL-ACNC: 16.1 SEC — HIGH (ref 10–12.9)
PROTHROM AB SERPL-ACNC: 17.2 SEC — HIGH (ref 10–12.9)
RBC # BLD: 2.91 M/UL — LOW (ref 4.2–5.8)
RBC # BLD: 3.16 M/UL — LOW (ref 4.2–5.8)
RBC # FLD: 18.6 % — HIGH (ref 10.3–14.5)
RBC # FLD: 18.6 % — HIGH (ref 10.3–14.5)
SODIUM SERPL-SCNC: 135 MMOL/L — SIGNIFICANT CHANGE UP (ref 135–145)
SODIUM SERPL-SCNC: 135 MMOL/L — SIGNIFICANT CHANGE UP (ref 135–145)
VANCOMYCIN TROUGH SERPL-MCNC: 16 UG/ML — SIGNIFICANT CHANGE UP (ref 10–20)
VANCOMYCIN TROUGH SERPL-MCNC: 20.8 UG/ML — HIGH (ref 10–20)
WBC # BLD: 29.14 K/UL — HIGH (ref 3.8–10.5)
WBC # BLD: 31.51 K/UL — HIGH (ref 3.8–10.5)
WBC # FLD AUTO: 29.14 K/UL — HIGH (ref 3.8–10.5)
WBC # FLD AUTO: 31.51 K/UL — HIGH (ref 3.8–10.5)

## 2020-01-17 PROCEDURE — 99232 SBSQ HOSP IP/OBS MODERATE 35: CPT

## 2020-01-17 PROCEDURE — 99231 SBSQ HOSP IP/OBS SF/LOW 25: CPT

## 2020-01-17 PROCEDURE — 99233 SBSQ HOSP IP/OBS HIGH 50: CPT

## 2020-01-17 PROCEDURE — 71045 X-RAY EXAM CHEST 1 VIEW: CPT | Mod: 26

## 2020-01-17 RX ORDER — FLUCONAZOLE 150 MG/1
400 TABLET ORAL EVERY 24 HOURS
Refills: 0 | Status: DISCONTINUED | OUTPATIENT
Start: 2020-01-17 | End: 2020-01-18

## 2020-01-17 RX ORDER — SODIUM CHLORIDE 9 MG/ML
1000 INJECTION, SOLUTION INTRAVENOUS
Refills: 0 | Status: DISCONTINUED | OUTPATIENT
Start: 2020-01-17 | End: 2020-01-20

## 2020-01-17 RX ORDER — AMIKACIN SULFATE 250 MG/ML
500 INJECTION, SOLUTION INTRAMUSCULAR; INTRAVENOUS ONCE
Refills: 0 | Status: COMPLETED | OUTPATIENT
Start: 2020-01-17 | End: 2020-01-17

## 2020-01-17 RX ADMIN — Medication 1 APPLICATION(S): at 12:40

## 2020-01-17 RX ADMIN — HYDROMORPHONE HYDROCHLORIDE 0.5 MILLIGRAM(S): 2 INJECTION INTRAMUSCULAR; INTRAVENOUS; SUBCUTANEOUS at 12:40

## 2020-01-17 RX ADMIN — BRIMONIDINE TARTRATE 1 DROP(S): 2 SOLUTION/ DROPS OPHTHALMIC at 05:21

## 2020-01-17 RX ADMIN — BRIMONIDINE TARTRATE 1 DROP(S): 2 SOLUTION/ DROPS OPHTHALMIC at 12:39

## 2020-01-17 RX ADMIN — HYDROMORPHONE HYDROCHLORIDE 0.5 MILLIGRAM(S): 2 INJECTION INTRAMUSCULAR; INTRAVENOUS; SUBCUTANEOUS at 13:00

## 2020-01-17 RX ADMIN — PANTOPRAZOLE SODIUM 40 MILLIGRAM(S): 20 TABLET, DELAYED RELEASE ORAL at 12:39

## 2020-01-17 RX ADMIN — CHLORHEXIDINE GLUCONATE 1 APPLICATION(S): 213 SOLUTION TOPICAL at 05:21

## 2020-01-17 RX ADMIN — Medication 4 MILLIGRAM(S): at 05:21

## 2020-01-17 RX ADMIN — HYDROMORPHONE HYDROCHLORIDE 0.5 MILLIGRAM(S): 2 INJECTION INTRAMUSCULAR; INTRAVENOUS; SUBCUTANEOUS at 01:27

## 2020-01-17 RX ADMIN — AMIKACIN SULFATE 102 MILLIGRAM(S): 250 INJECTION, SOLUTION INTRAMUSCULAR; INTRAVENOUS at 15:54

## 2020-01-17 RX ADMIN — MEROPENEM 100 MILLIGRAM(S): 1 INJECTION INTRAVENOUS at 21:08

## 2020-01-17 RX ADMIN — HYDROMORPHONE HYDROCHLORIDE 0.5 MILLIGRAM(S): 2 INJECTION INTRAMUSCULAR; INTRAVENOUS; SUBCUTANEOUS at 06:00

## 2020-01-17 RX ADMIN — AMIODARONE HYDROCHLORIDE 200 MILLIGRAM(S): 400 TABLET ORAL at 05:21

## 2020-01-17 RX ADMIN — HYDROMORPHONE HYDROCHLORIDE 0.5 MILLIGRAM(S): 2 INJECTION INTRAMUSCULAR; INTRAVENOUS; SUBCUTANEOUS at 21:23

## 2020-01-17 RX ADMIN — FLUCONAZOLE 100 MILLIGRAM(S): 150 TABLET ORAL at 17:32

## 2020-01-17 RX ADMIN — HYDROMORPHONE HYDROCHLORIDE 0.5 MILLIGRAM(S): 2 INJECTION INTRAMUSCULAR; INTRAVENOUS; SUBCUTANEOUS at 21:08

## 2020-01-17 RX ADMIN — LATANOPROST 1 DROP(S): 0.05 SOLUTION/ DROPS OPHTHALMIC; TOPICAL at 12:40

## 2020-01-17 RX ADMIN — HYDROMORPHONE HYDROCHLORIDE 0.5 MILLIGRAM(S): 2 INJECTION INTRAMUSCULAR; INTRAVENOUS; SUBCUTANEOUS at 05:45

## 2020-01-17 RX ADMIN — BRIMONIDINE TARTRATE 1 DROP(S): 2 SOLUTION/ DROPS OPHTHALMIC at 21:08

## 2020-01-17 RX ADMIN — AMIODARONE HYDROCHLORIDE 200 MILLIGRAM(S): 400 TABLET ORAL at 17:32

## 2020-01-17 RX ADMIN — HYDROMORPHONE HYDROCHLORIDE 0.5 MILLIGRAM(S): 2 INJECTION INTRAMUSCULAR; INTRAVENOUS; SUBCUTANEOUS at 01:42

## 2020-01-17 RX ADMIN — Medication 1 TABLET(S): at 12:40

## 2020-01-17 NOTE — CHART NOTE - NSCHARTNOTEFT_GEN_A_CORE
Nutrition Follow Up Note    Patient seen for: nutrition follow up on 8ICU    Source: patient, medical record, 8ICU team rounds    Chart reviewed, events noted. 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 Two servings Nepro supplement and daily Nephro-candace added today. Pt receiving HD today.    Diet : Renal; 2 servings Nepro supplement     PO intake : Pt endorses good po intake and appetite    Last BM:     Last HD: 1/15 (-2L); HD in progress today     Daily Weight in k.5 (), Weight in k.9 (), Weight in k.7 (01-15), Weight in k.7 (01-15), Weight in k.7 (01-15), Weight in k (), Weight in k ()    Drug Dosing Weight  Weight (kg): 63.5 (2020 20:33)  BMI (kg/m2): 19.5 (2020 20:33)    Pertinent Medications: MEDICATIONS  (STANDING):  aMIOdarone    Tablet 200 milliGRAM(s) Oral two times a day  brimonidine 0.2% Ophthalmic Solution 1 Drop(s) Both EYES three times a day  chlorhexidine 2% Cloths 1 Application(s) Topical <User Schedule>  dextrose 5% + sodium chloride 0.9%. 1000 milliLiter(s) (30 mL/Hr) IV Continuous <Continuous>  heparin  Infusion 1000 Unit(s)/Hr (12 mL/Hr) IV Continuous <Continuous>  latanoprost 0.005% Ophthalmic Solution 1 Drop(s) Both EYES daily  meropenem  IVPB 500 milliGRAM(s) IV Intermittent every 24 hours  methylPREDNISolone sodium succinate Injectable 4 milliGRAM(s) IV Push daily  Nephro-candace 1 Tablet(s) Oral daily  pantoprazole  Injectable 40 milliGRAM(s) IV Push daily  petrolatum Ophthalmic Ointment 1 Application(s) Both EYES daily    MEDICATIONS  (PRN):  HYDROmorphone  Injectable 0.5 milliGRAM(s) IV Push every 3 hours PRN Breakthrough Pain    LABS:    @ 03:48: Sodium 135, Potassium 3.7, Chloride 98, Calcium 7.9<L>, Magnesium 2.0, Phosphorus 3.8, BUN 22, Creatinine 5.15<H>, <H>, Alk Phos 178<H>, ALT/SGPT 5<L>, AST/SGOT 11, Total Protein 5.1<L>, Albumin 2.2<L>, Total Bilirubin 1.3<H>, Direct Bilirubin 0.9<H>, Hemoglobin 8.7<L>, Hematocrit 26.4<L>    Skin per nursing documentation: no pressure injuries noted  Edema: 3+ dependent    Estimated Needs: based on dosing wt 63.5Kg, with consideration for extubation and ESRD on HD (S/P removal of transplanted kidney)  5804-8329 ritika/day (25-30cal/Kg)  76-89 Gm protein/day (1.2-1.4Gm/Kg)    Previous Nutrition Diagnosis: 1) Increased Nutrient Needs 2) Limited adherence to nutrition therapy education  Nutrition Diagnosis is: ongoing, being addressed with therapeutic diet, oral supplements, and nutrition education reinforcement    New Nutrition Diagnosis: none     Interventions: oral supplements and renal multivitamin added to meet increased needs for HD    Recommend  1) Continue Renal diet in-house and upon discharge.  2) 2 servings Nepro supplement added, to help meet increased protein needs for HD.  3) Daily Nephro-candace, renal multivitamin, added.  4) Continue to reinforce diet education prior to discharge.    Monitoring and Evaluation:     Continue to monitor nutritional intake, tolerance to diet prescription, weights, labs, skin integrity.    RD remains available upon request and will follow up per protocol.    America Anglin, MS WILHELM CDN [facility], Pager # [phone]. Nutrition Follow Up Note    Patient seen for: nutrition follow up on 8ICU    Source: patient, medical record, 8ICU team rounds    Chart reviewed, events noted. 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 Two servings Nepro supplement and daily Nephro-candace added today. Reinforced education on adequate protein intake for HD; pt expressed understanding and is amenable to supplements. Pt receiving HD today.    Diet : Renal; 2 servings Nepro supplement     PO intake : Pt endorses good po intake and appetite    Last BM:     Last HD: 1/15 (-2L); HD in progress today     Daily Weight in k.5 (), Weight in k.9 (), Weight in k.7 (01-15), Weight in k.7 (01-15), Weight in k.7 (01-15), Weight in k (), Weight in k ()    Drug Dosing Weight  Weight (kg): 63.5 (2020 20:33)  BMI (kg/m2): 19.5 (2020 20:33)    Pertinent Medications: MEDICATIONS  (STANDING):  aMIOdarone    Tablet 200 milliGRAM(s) Oral two times a day  brimonidine 0.2% Ophthalmic Solution 1 Drop(s) Both EYES three times a day  chlorhexidine 2% Cloths 1 Application(s) Topical <User Schedule>  dextrose 5% + sodium chloride 0.9%. 1000 milliLiter(s) (30 mL/Hr) IV Continuous <Continuous>  heparin  Infusion 1000 Unit(s)/Hr (12 mL/Hr) IV Continuous <Continuous>  latanoprost 0.005% Ophthalmic Solution 1 Drop(s) Both EYES daily  meropenem  IVPB 500 milliGRAM(s) IV Intermittent every 24 hours  methylPREDNISolone sodium succinate Injectable 4 milliGRAM(s) IV Push daily  Nephro-candace 1 Tablet(s) Oral daily  pantoprazole  Injectable 40 milliGRAM(s) IV Push daily  petrolatum Ophthalmic Ointment 1 Application(s) Both EYES daily    MEDICATIONS  (PRN):  HYDROmorphone  Injectable 0.5 milliGRAM(s) IV Push every 3 hours PRN Breakthrough Pain    LABS:    @ 03:48: Sodium 135, Potassium 3.7, Chloride 98, Calcium 7.9<L>, Magnesium 2.0, Phosphorus 3.8, BUN 22, Creatinine 5.15<H>, <H>, Alk Phos 178<H>, ALT/SGPT 5<L>, AST/SGOT 11, Total Protein 5.1<L>, Albumin 2.2<L>, Total Bilirubin 1.3<H>, Direct Bilirubin 0.9<H>, Hemoglobin 8.7<L>, Hematocrit 26.4<L>    Skin per nursing documentation: no pressure injuries noted  Edema: 3+ dependent    Estimated Needs: based on dosing wt 63.5Kg, with consideration for extubation and ESRD on HD (S/P removal of transplanted kidney)  [id number] ritika/day (25-30cal/Kg)  76-89 Gm protein/day (1.2-1.4Gm/Kg)    Previous Nutrition Diagnosis: 1) Increased Nutrient Needs 2) Limited adherence to nutrition therapy education  Nutrition Diagnosis is: ongoing, being addressed with therapeutic diet, oral supplements, and nutrition education reinforcement    New Nutrition Diagnosis: none     Interventions: oral supplements and renal multivitamin added to meet increased needs for HD    Recommend  1) Continue Renal diet in-house and upon discharge.  2) 2 servings Nepro supplement added, to help meet increased protein needs for HD.  3) Daily Nephro-candace, renal multivitamin, added.  4) Continue to reinforce diet education prior to discharge.    Monitoring and Evaluation:     Continue to monitor nutritional intake, tolerance to diet prescription, weights, labs, skin integrity.    RD remains available upon request and will follow up per protocol.    America Anglin, MS MELONIE CDN [facility], Pager # [phone].

## 2020-01-17 NOTE — PROGRESS NOTE ADULT - ATTENDING COMMENTS
Patient seen and examined and agree with above.   No acute events overnight.    Current management includes:  1)Pain controlled with dilaudid PRN     2) Respiratory state is stable with SpO2 99% on room air.   CXR with increased pulmonary congestion - patient will undergo HD with planned 2 L removal   Saddle PE - continue heparin drip with goal 60-99    3) Hemodynamically stable  -continue amiodarone at this time for recent episodes of torsades    4) Elevated leukocytosis - continue antibiotics   -amikacin added     5) ESRD - received HD today  -will monitor electrolytes    Will continue to monitor closely.  Will plan for CT scan next week to assess size of pelvic hematoma.

## 2020-01-17 NOTE — PROGRESS NOTE ADULT - ASSESSMENT
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 RVP with Influenza A s/p Tamiflu treatment course. Now with infected carlos-transplant hematoma.    1/1: s/p Ex-Lap and washout of infected hematoma (Enterobacter from cultures)  1/6: s/p IR guided drainage attempt of hematoma on 1/6 (NGTD from cultures  1/10: s/p transplant nephrectomy, repair of right external iliac artery with a bovine pericardial patch, repair of right external iliac vein and thrombectomy of right lower extremity veins.   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   I suspect Enterobacter is the primary pathogen    PEA arrest and Torsades on 1/13  CT C/A/P (1/13) with Saddle PE, interval increase in size of RP fluid collection (infected hematoma)    Suspect leukocytosis is reactive from residual hematoma, recent PEA Arrest and Saddle PE  I suspect Meropenem monotherapy is sufficient but reasonable to cover short term with Amikacin given rise in WBC  If further rise in WBC count would repeat CT C/A/P (can hold off for now)    Overall, Saddle pulmonary embolus, Leukocytosis (increasing), Infected Hematoma, Positive Urine Culture, Influenza Infection, Fever, Renal Transplant Recipient    --Recommend Amikacin 7.5 mg/kg IV Q48H (Extra 3.75 mg/kg post-HD). Would check trough 30 min prior to following dose.   --Continue Meropenem 500 mg IV Q24H  --Would consider decreasing fluconazole to 200 mg PO Q24H (prophylaxis dose) - no evidence of yeast on cultures  --Continue to follow CBC with diff  --Continue to follow temperature curve  --Follow up on preliminary surgical cultures   --Follow up on preliminary blood cultures   --Management of Saddle PE per 8ICU team and vascular teams    I will continue to follow. Please feel free to contact me with any further questions.    Dada Jacobo M.D.  [facility] Division of Infectious Disease  8AM-5PM: Pager Number [phone]  After Hours (or if no response): Please contact the Infectious Diseases Office at [phone]

## 2020-01-17 NOTE — PROGRESS NOTE ADULT - ATTENDING COMMENTS
49 yr old man with ESRD s/p DDRT in Dec 2019 complicated by DGF due to ATN and early TMA thought to be due to prograf. Was on belatacept/MMF/Pred immunosuppression.   He was admitted with influenza, course complicated by perinephric hematoma complicated by enterobacter infection. s/p surgical exploration and wash out. He developed DVT started on heparin drip, subsequent gross hematuria. He was taken to the OR and found to have dehiscence of the ureter-bladder anastomosis as well as vascular anastomosis. He underwent graft nephrectomy on 1/10/20 with bovine biopatch repair of the EIA. Was maintained on antibiotics with meropenem, cipro and diflucan. On 1/13/20 he had cardiac arrest due to large pulmonary embolism s/p successful resuscitation.   S/p bedside drainage of RLQ collection on 1/15/20, 200cc removed and drain placed.   Echo 1/15/20 - improving right heart strain.     Extubated yesterday AM. No major events overnight. Hemodynamically stable.   Sitting in chair this AM. lungs b/l crackles present. Legs with worsening edema extending to the thighs R>L.     Labs reviewed. Hgb 8.7, WBC 29  CXR reviewed - b/l congestion       - HD today - spoke to Dr. Lawson   - CT with iv contrast to evaluate chest abd pelvis   - Repeat HD tomorrow 1/18   - Continue meropenem, resume diflucan and add amikacin   - Continue heparin drip

## 2020-01-17 NOTE — PROGRESS NOTE ADULT - ASSESSMENT
49 year old male with ESRD on HD, now s/p ddrt on 12/8/2019 course complicated by TMA/profound ATN, who presents from a HD center with a fever and cough. He was initially admitted with influenza. He is s/p washout of infected collection and biopsy which revealed ATN.  He was found to have diffusely enlarging perinephric fluid collection and is awaiting drainage.    While on telemetry, he had an episode of a wide complex tachycardia. It initially appears irregular, suggesting an atrial arrhythmia with aberrancy, though the remainder appears more like an episode of non-sustained VT.   s/p washout on 1/1/2020 with worsening sepsis and transfer to ICU, s/p IR drainage of perinephric collection on 1/6. 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 He underwent operative repair of right external iliac artery with pericardial patch, transplant nephrectomy, RLE thrombectomy, and IVC filter on 1/9.     Now s/p corrina ->tachy arrest with tdp, af rvr, now on amio, vented, with large PE    - critical events noted. now off of pressor support. Bp has improved overall  - developed malignant arrhythmias which seemed to start with bradycardia, leading to more tachycardia and bursts of VT and rapid AF.  The apparent af degenerated into torsades.  He was shocked and has been maintaining SR since  -  Currently on a low dose Amio load. Should be loaded to 10g and then decreased to 200mg Qday. If further arrhythmias present would increase Amio to 400mg q12.   - arrhythmias were most likely triggered by the massive vte event, are unlikely to represent a primary cardiac issue  - cannot exclude an ischemic contribution, though the likelihood of that is small overall.  - hs trop noted, though in the context of his arrhythmias, and right heart strain from the pe, the trop is more likely to reflect demand than an acs event  - ideally would be on asa, statin when able     - Now extubated. Monitor resp status closely.   - vasc medicine to evaluate but for now heparin gtt remains appropriate and the source of the thrombus is known  - echo results noted, with preserved/hyperdynamic lv, with RV failure consistent with acute RV overload  - Further cardiac workup will depend on clinical course.   - Patient at high risk for decompensation given the above comorbidities and active medical issues. Critically ill. I have personally spent >35 minutes  of critical care time in examining patient, reviewing chart, discussing care/management.

## 2020-01-17 NOTE — PROGRESS NOTE ADULT - SUBJECTIVE AND OBJECTIVE BOX
HISTORY  49y Male ESRD on HD  (via LUE AVF), HTN, Gout, Glaucoma, PNET s/p robotic distal panc/splenectomy (at [place] 2015 by Dr. Young, followed by Dr. Raphael, [facility] Oncologist), RA with hand contractures. 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 Post op course c/b DGF requiring HD, thrombocytopenia, elevated LDH and Haptoglobulin. Schistocytes  on peripheral smear concerning for TMA. Envarsus held. Received Belatacept 12/13. Started on PLEX (12/12, 12/15). Underwent renal bx on 12/13 c/w profound ATN.  Found to have much improvement to levels, likely related to Tacrolimus. He was discharged home on 12/20/19 w/ outpatient HD.     He was now sent to ED by dialysis center after he was found to be febrile to 102, he did not get HD this AM, and last full HD session was on 12/26. Upon arrival, he was febrile to 101.7, other vitals were stable. He states that he had a cough that began 5-6 days ago that has now mostly resolved. He reports no other febrile episodes other than this morning. He admits to having one SOB episode earlier this week that resolved after he got HD. He also states that his mother was hospitalized earlier this week and was flu+. Currently makes less than 1 cup urine/daily. He currently denies SOB, CP, dyspnea, HA, dizziness, N/V, diarrhea, constipation.     1/1/2020 was taken to OR and found to have a perinephric hematoma. Underwent washout, evacuation of hematoma, and biopsy of transplanted kidney. He was recovering on the floor when overnight he spiked a fever to 38.1 and had 18 beats of wide complex tachycardia. He was also found to have worsening leukocytosis and became acutely hypotensive. A CT scan was obtained demonstrating a 4.5x3.7 perinephric collection. IR placed a drain on 1/6. Post-procedure, patient began having gross hematuria. CBI was started with improvement in hematuria. Repeat renal Duplex on 1/7 demonstrated no change in the perinephric collection. RLE was noted to be more swollen so a LE Duplex was obtained on 1/8, which demonstrated a right external iliac and common femoral DVT so a heparin infusion was started. However, patient had worsening hematuria w/ downtrending HCT. 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 A stent was placed to temporize the patient and he was taken to the OR early on 1/10 for an emergent right groin exploration, RP washout of hematoma, transplant nephrectomy, right external iliac artery repair w/ bovine patch, thrombectomy of the RLE veins, removal of the IR stent, and external iliac vein ligation. Case was uneventful and patient returned to SICU intubated and on vasopressor support. He was extubated and weaned off vasopressor support shortly after return to SICU.    On 1/14 had episode of PEA arrest, ACLS performed with ROSC 7 min with shockx2. Intubated, hypotensive, found to have saddle PE. Received heparin bolus, continued on therapeutic A/C.       24 HOUR EVENTS:  - Extubated  - Passed bedside dsyphagia screen   - Cipor/fluconazole discontinued   - amiodarone gtt discontinued; PO started     SUBJECTIVE/ROS:  [ ] A ten-point review of systems was otherwise negative except as noted.  [ ] Due to altered mental status/intubation, subjective information were not able to be obtained from the patient. History was obtained, to the extent possible, from review of the chart and collateral sources of information.      NEURO  Exam: awake, alert, oriented  Meds: HYDROmorphone  Injectable 0.5 milliGRAM(s) IV Push every 3 hours PRN Breakthrough Pain    [x] Adequacy of sedation and pain control has been assessed and adjusted      RESPIRATORY  RR: 15 (01-16-20 @ 23:00) (11 - 28)  SpO2: 95% (01-16-20 @ 23:00) (74% - 100%)  Exam: unlabored, clear to auscultation bilaterally  Mechanical Ventilation: Mode: 40% AM, RR (patient): 20  ABG - ( 16 Jan 2020 09:53 )  pH: 7.48  /  pCO2: 34    /  pO2: 147   / HCO3: 25    / Base Excess: 2.0   /  SaO2: 99      Lactate: x      [N/A] Extubation Readiness Assessed  Meds: none      CARDIOVASCULAR  HR: 80 (01-16-20 @ 23:00) (75 - 89)  ABP: 126/54 (01-16-20 @ 23:00) (126/54 - 175/65)  ABP(mean): 76 (01-16-20 @ 23:00) (76 - 108)  Exam: regular rate and rhythm  Cardiac Rhythm: sinus  Perfusion     [x]Adequate   [ ]Inadequate  Mentation   [x]Normal       [ ]Reduced  Extremities  [x]Warm         [ ]Cool  Volume Status [ ]Hypervolemic [x]Euvolemic [ ]Hypovolemic  Meds: aMIOdarone    Tablet 200 milliGRAM(s) Oral two times a day        GI/NUTRITION  Exam: soft, nontender, nondistended  Diet: renal diet   Meds: pantoprazole  Injectable 40 milliGRAM(s) IV Push daily      GENITOURINARY  I&O's Detail    01-15 @ 07:01  -  01-16 @ 07:00  --------------------------------------------------------  IN:    amiodarone Infusion: 400.8 mL    dextrose 5% + sodium chloride 0.9%.: 850 mL    heparin Infusion: 204 mL    Instillation: 5 mL    Other: 650 mL    sodium chloride 0.9%: 350 mL    Solution: 500 mL  Total IN: 2959.8 mL    OUT:    Bulb: 190 mL    Nasoenteral Tube: 75 mL    Other: 2650 mL    VAC (Vacuum Assisted Closure) System: 20 mL  Total OUT: 2935 mL    Total NET: 24.8 mL      01-16 @ 07:01 - 01-17 @ 00:47  --------------------------------------------------------  IN:    amiodarone Infusion: 250.5 mL    dextrose 5% + sodium chloride 0.9%.: 850 mL    heparin Infusion: 186 mL    Solution: 450 mL  Total IN: 1736.5 mL    OUT:    Bulb: 225 mL  Total OUT: 225 mL    Total NET: 1511.5 mL          01-16    135  |  95<L>  |  20  ----------------------------<  105<H>  3.9   |  21<L>  |  4.87<H>    Ca    7.9<L>      16 Jan 2020 21:22  Phos  3.6     01-16  Mg     2.0     01-16    TPro  5.2<L>  /  Alb  2.3<L>  /  TBili  1.3<H>  /  DBili  0.9<H>  /  AST  13  /  ALT  <5<L>  /  AlkPhos  185<H>  01-16    [x] Kennedy catheter, indication: urologic procedure   Meds: dextrose 5% + sodium chloride 0.9%. 1000 milliLiter(s) IV Continuous <Continuous>        HEMATOLOGIC  Meds: heparin  Infusion 1000 Unit(s)/Hr IV Continuous <Continuous>    [x] VTE Prophylaxis                        9.0    33.58 )-----------( 349      ( 16 Jan 2020 21:22 )             26.7     PT/INR - ( 16 Jan 2020 21:22 )   PT: 16.5 sec;   INR: 1.42 ratio         PTT - ( 16 Jan 2020 21:22 )  PTT:75.0 sec  Transfusion     [ ] PRBC   [ ] Platelets   [ ] FFP   [ ] Cryoprecipitate      INFECTIOUS DISEASES  WBC Count: 33.58 K/uL (01-16 @ 21:22)  WBC Count: 33.21 K/uL (01-16 @ 13:49)  WBC Count: 31.20 K/uL (01-16 @ 07:55)    RECENT CULTURES:  Specimen Source: .Body Fluid Abdominal Fluid  Date/Time: 01-15 @ 22:02  Culture Results:   No growth  Gram Stain:   polymorphonuclear leukocytes seen  No organisms seen  by cytocentrifuge  Organism: --    Meds: meropenem  IVPB 500 milliGRAM(s) IV Intermittent every 24 hours        ENDOCRINE  CAPILLARY BLOOD GLUCOSE        Meds: methylPREDNISolone sodium succinate Injectable 4 milliGRAM(s) IV Push daily        ACCESS DEVICES:  [x] Peripheral IV  [ ] Central Venous Line	[ ] R	[ ] L	[ ] IJ	[ ] Fem	[ ] SC	Placed:   [ ] Arterial Line		[ ] R	[ ] L	[ ] Fem	[ ] Rad	[ ] Ax	Placed:   [ ] PICC:					[ ] Mediport  [ ] Urinary Catheter, Date Placed:   [x] Necessity of urinary, arterial, and venous catheters discussed    OTHER MEDICATIONS:  brimonidine 0.2% Ophthalmic Solution 1 Drop(s) Both EYES three times a day  chlorhexidine 2% Cloths 1 Application(s) Topical <User Schedule>  latanoprost 0.005% Ophthalmic Solution 1 Drop(s) Both EYES daily  petrolatum Ophthalmic Ointment 1 Application(s) Both EYES daily      CODE STATUS: full code       IMAGING: < from: CT Abdomen and Pelvis w/ IV Cont (01.13.20 @ 20:11) >  IMPRESSION:     Saddle pulmonary embolus with extension into the right upper, right lower, and left lower lobar arteries with involvement of many of the distal segmental and subsegmental branches.    Dr. Dietrich discussed these findings with Dr. Deshpande on 1/13/2020 8:21 PM with read back.    Small right pneumothorax. Interval increased size of a rim-enhancing retroperitoneal fluid collection containing a focus of air. This may represent a hematoma, infected hematoma, seroma, or abscess. There is thrombus at the level of the IVC filter. Some of the thrombus is tracking laterally and superiorly to the filter.      < end of copied text >

## 2020-01-17 NOTE — PROGRESS NOTE ADULT - SUBJECTIVE AND OBJECTIVE BOX
Transplant Surgery - Multidisciplinary Rounds  --------------------------------------------------------------  R DDRT    Date:  12/8/19     Present:   Patient seen with multidisciplinary team including Transplant Surgeon: Dr. Espinal, Transplant Nephrologist: Dr. Irvin, renal fellow, Pharmacist: Debbi Ortiz, PGY 3 Dr. Bess, PAs: Charmaine Sun,, PA student, and pharmacy student during am rounds and examined with Dr. Espinal. Disciplines not in attendance will be notified of the plan.     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 Young, followed by Dr. Raphael, [facility] Oncologist), RA with hand contractures. He underwent DDRT on 12/8/19. Post op course c/b DGF requiring HD, thrombocytopenia, elevated LDH and haptoglobin. Schistocytes  on peripheral smear concerning for TMA. Envarsus held. Received Belatacept 12/13. Started on PLEX (12/12, 12/15). Underwent renal bx on 12/13 c/w profound ATN.  Discharged home on 12/20/19 w/ outpatient HD.     Re-admitted 12/27 with influenza, completed treatement with Tamiflu on 12/31. Urine cx on admission grew Ent cloacae, was started on meropenem 12/31. Renal US on admission with increasing hematoma.   ·	OR 1/1 for ex-lap, hematoma evacuation, washout and renal bx. (c/w ATN)  OR cxs grew CRE and e coli. Post op course c/b:   ·	fevers and leukocytosis, CT a/p (1/4) with increased perinephric collection. Underwent IR guided collection 1/6   ·	New onset hematuria, martniez inserted 1/7, started CBI  ·	RLE edema, RLE doppler (1/8) with acute above the knee thrombus of R external iliac, common femoral and femoral veins, Acute calf vein thrombus affecting R soleal vein, Heparin gtt initiated   ·	AMS 1/9  (head CT neg), hyponatremic  ·	Significant hematuria and bleeding around the martinez. Angio 1/9 showed actively bleeding pseudoaneurysm at the anastomosis of the transplant renal artery with the recipient iliac artery. A stent was placed in the iliac artery to  control hemorrhage. IVC filter placed.   ·	Emergent OR 1/9-1/10 for graft nephrectomy;  ureter to bladder anastomosis was completely disrupted, repaired the R external iliac artery with bovine pericardial patch, repaired right external iliac vein, performed thrombectomy of RLE veins, Intra op received 3u PRBC.   ·	Extubated 1/11  ·	1/13 Saddle PE, with right heart strain and complicated by PEA arrest followed by Torsade, ROSC achieved after ACLS; re-intubated  ·	1/15 Renal US with 13.7 x 5.0 x 1.5 cm in RLQ transplant bed; s/p bedside IR guided drainage, pigtail in place. Fluid cx to date with NG    Interval events:    - extubated yesterday; no overnight events  - Off amio gtt, transitioned to PO  - leukocytosis (wbc down 29 from 33): repeat bld cxs pending; received vanco 1g (1/16), on meropenem; off cipro/fluc. Fluid cx from 1/15 (IR drainage of RLQ collection) with no growth to date  - Plan for CT chest/abd/pelvis today with IV contrast  - HD today    cxs:   12/29: Urine Cx: Enterobacter Cloacae  1/1 OR Perinephric collection Cx:  Carbapenem resistant Ent Cloacae, E. Coli  1/1: R kidney abscess swab: Enterobacter Cloacae  1/1  OR tissue: Enterobacter Cloacae  1/5: Urine Cx: Enteropbacter Cloacae  1/5: Skin incision Cx (bedside): NG  1/6: Adominal fluid Cx from IR:  NG   1/7: Urine Cx:  NG  1/7: BCX2:  NG  1/10 retroperit hematoma - enerobacter   1/10 bladder hematoma - enterobacter  1/15 Drainage fluid - NGTD    Potential Discharge date: pending clinical improvement    MEDICATIONS  (STANDING):  aMIOdarone    Tablet 200 milliGRAM(s) Oral two times a day  brimonidine 0.2% Ophthalmic Solution 1 Drop(s) Both EYES three times a day  chlorhexidine 2% Cloths 1 Application(s) Topical <User Schedule>  dextrose 5% + sodium chloride 0.9%. 1000 milliLiter(s) (30 mL/Hr) IV Continuous <Continuous>  heparin  Infusion 1000 Unit(s)/Hr (12 mL/Hr) IV Continuous <Continuous>  latanoprost 0.005% Ophthalmic Solution 1 Drop(s) Both EYES daily  meropenem  IVPB 500 milliGRAM(s) IV Intermittent every 24 hours  methylPREDNISolone sodium succinate Injectable 4 milliGRAM(s) IV Push daily  Nephro-candace 1 Tablet(s) Oral daily  pantoprazole  Injectable 40 milliGRAM(s) IV Push daily  petrolatum Ophthalmic Ointment 1 Application(s) Both EYES daily    MEDICATIONS  (PRN):  HYDROmorphone  Injectable 0.5 milliGRAM(s) IV Push every 3 hours PRN Breakthrough Pain      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  Carpal tunnel syndrome  Brain cyst: had MRI recently- now gone  Anemia  Gastric ulcer: Long time ago  Rheumatoid arthritis  Renal transplant recipient  Breakdown (mechanical) of penile (implanted) prosthesis, sequela  End-stage renal disease (ESRD): PERMACATH PLACEMENT  Abscess of left buttock: s/p I &amp; D  AV fistula: placement left lower arm  S/P cystoscopy: stent placement &amp; removal for kidney stones, lithiotripsy  s/p Lt Carpal tunnel: 2011      Vital Signs Last 24 Hrs  T(C): 36.7 (17 Jan 2020 09:30), Max: 37.7 (16 Jan 2020 19:00)  T(F): 98 (17 Jan 2020 09:30), Max: 99.9 (16 Jan 2020 19:00)  HR: 76 (17 Jan 2020 10:00) (75 - 86)  BP: 114/64 (17 Jan 2020 07:00) (114/64 - 114/64)  BP(mean): 83 (17 Jan 2020 07:00) (83 - 83)  RR: 19 (17 Jan 2020 10:00) (15 - 34)  SpO2: 99% (17 Jan 2020 10:00) (92% - 99%)    I&O's Summary    16 Jan 2020 07:01  -  17 Jan 2020 07:00  --------------------------------------------------------  IN: 2337.4 mL / OUT: 495 mL / NET: 1842.4 mL    17 Jan 2020 07:01  -  17 Jan 2020 11:46  --------------------------------------------------------  IN: 126 mL / OUT: 0 mL / NET: 126 mL                          8.7    29.14 )-----------( 352      ( 17 Jan 2020 03:48 )             26.4     01-17    135  |  98  |  22  ----------------------------<  118<H>  3.7   |  21<L>  |  5.15<H>    Ca    7.9<L>      17 Jan 2020 03:48  Phos  3.8     01-17  Mg     2.0     01-17    TPro  5.1<L>  /  Alb  2.2<L>  /  TBili  1.3<H>  /  DBili  0.9<H>  /  AST  11  /  ALT  5<L>  /  AlkPhos  178<H>  01-17      Culture - Fungal, Body Fluid (collected 01-15-20 @ 22:02)  Source: .Body Fluid Abdominal Fluid  Preliminary Report (01-16-20 @ 08:50):    Testing in progress    Culture - Body Fluid with Gram Stain (collected 01-15-20 @ 22:02)  Source: .Body Fluid Abdominal Fluid  Gram Stain (01-16-20 @ 04:26):    polymorphonuclear leukocytes seen    No organisms seen    by cytocentrifuge  Preliminary Report (01-16-20 @ 18:45):    No growth    REVIEW OF SYSTEMS  Gen: + weakness   Skin: No rashes  Head/Eyes/Ears/Mouth: No headache; Normal hearing; Normal vision w/o blurriness; No sinus pain/discomfort, sore throat  Respiratory: + cough, dry  CV: No chest pain, PND, orthopnea  GI: incisional tenderness   : + martinez  MSK: + lower extrem edema  Neuro: No dizziness/lightheadedness, weakness, seizures, numbness, tingling  Heme: No easy bruising or bleeding  Endo: No heat/cold intolerance  Psych: No significant nervousness, anxiety, stress, depression    PHYSICAL EXAM:    Constitutional: extubated, NAD  	Eyes: Anicteric, PERRLA  	Respiratory: + crackles  	Cardiovascular: RRR  	Gastrointestinal: Soft abdomen, appropriate incisional TTP, ND.  wound vac ss/murky output, RLQ drain with SS drainage    Genitourinary:  Martinez, anuric, residual hematuria in bag    Extremities: b/l lower extrem edema, R> L   	Vascular: palpable DP pulses. L AVF palpable  	Neurological: A&O x3.  	Skin: no new lesions/ulcerations  	Musculoskeletal: Moving all extremities

## 2020-01-17 NOTE — PROGRESS NOTE ADULT - SUBJECTIVE AND OBJECTIVE BOX
Follow Up:  infected hematoma    Interval History: afebrile overnight. no n/v/d. abdominal pain minimal.     REVIEW OF SYSTEMS  [  ] ROS unobtainable because:    [ x ] All other systems negative except as noted below    Constitutional:  [ ] fever [ ] chills  [ ] weight loss  [ ] weakness  Skin:  [ ] rash [ ] phlebitis	  Eyes: [ ] icterus [ ] pain  [ ] discharge	  ENMT: [ ] sore throat  [ ] thrush [ ] ulcers [ ] exudates  Respiratory: [ ] dyspnea [ ] hemoptysis [ ] cough [ ] sputum	  Cardiovascular:  [ ] chest pain [ ] palpitations [ ] edema	  Gastrointestinal:  [ ] nausea [ ] vomiting [ ] diarrhea [ ] constipation [ x] pain	  Genitourinary:  [ ] dysuria [ ] frequency [ ] hematuria [ ] discharge [ ] flank pain  [ ] incontinence  Musculoskeletal:  [ ] myalgias [ ] arthralgias [ ] arthritis  [ ] back pain  Neurological:  [ ] headache [ ] seizures  [ ] confusion/altered mental status    Allergies  coconut (Anaphylaxis)  morphine (Pruritus; Rash)        ANTIMICROBIALS:  fluconAZOLE IVPB 400 every 24 hours  meropenem  IVPB 500 every 24 hours      OTHER MEDS:  MEDICATIONS  (STANDING):  aMIOdarone    Tablet 200 two times a day  heparin  Infusion 1000 <Continuous>  HYDROmorphone  Injectable 0.5 every 3 hours PRN  pantoprazole  Injectable 40 daily      Vital Signs Last 24 Hrs  T(C): 37.8 (17 Jan 2020 15:00), Max: 37.8 (17 Jan 2020 15:00)  T(F): 100 (17 Jan 2020 15:00), Max: 100 (17 Jan 2020 15:00)  HR: 75 (17 Jan 2020 18:00) (74 - 86)  BP: 114/64 (17 Jan 2020 07:00) (114/64 - 114/64)  BP(mean): 83 (17 Jan 2020 07:00) (83 - 83)  RR: 18 (17 Jan 2020 18:00) (11 - 34)  SpO2: 99% (17 Jan 2020 18:00) (92% - 99%)    PHYSICAL EXAMINATION:  General: Alert and Awake, NAD  HEENT: PERRL, EOMI  Neck: Supple  Cardiac: RRR, No M/R/G  Resp: CTAB, No Wh/Rh/Ra  Abdomen: +RLQ wound vac over transplant nephrectomy site, NBS, tenderness to palpation over  MSK: No LE edema. No Calf tenderness  Skin: No rashes or lesions. Skin is warm and dry to the touch.   Neuro: Alert and Awake. CN 2-12 Grossly intact. Moves all four extremities spontaneously.  Psych: Calm, Pleasant, Cooperative                          9.3    31.51 )-----------( 396      ( 17 Jan 2020 12:58 )             28.5       01-17    135  |  97  |  10  ----------------------------<  108<H>  3.9   |  27  |  2.96<H>    Ca    8.6      17 Jan 2020 12:58  Phos  2.4     01-17  Mg     2.0     01-17    TPro  5.9<L>  /  Alb  2.7<L>  /  TBili  1.6<H>  /  DBili  1.1<H>  /  AST  10  /  ALT  <5<L>  /  AlkPhos  198<H>  01-17          MICROBIOLOGY:  Vancomycin Level, Trough: 16.0 ug/mL (01-17-20 @ 12:58)  Vancomycin Level, Trough: 20.8 ug/mL (01-17-20 @ 03:48)  v  .Blood Blood  01-16-20   No growth to date.  --  --      .Body Fluid Abdominal Fluid  01-15-20   No growth  --    polymorphonuclear leukocytes seen  No organisms seen  by cytocentrifuge      .Tissue Other  01-10-20   No growth at 5 days  --  Enterobacter cloacae (Carbapenem Resistant)      .Blood Blood-Venous  01-07-20   No growth at 5 days.  --  --      .Urine Catheterized  01-07-20   <10,000 CFU/mL Normal Urogenital Gillian  --  --      Abdominal Fl Abdominal Fluid  01-06-20   No growth at 5 days  --    No polymorphonuclear leukocytes seen  No organisms seen  by cytocentrifuge      .Urine Clean Catch (Midstream)  01-05-20   >100,000 CFU/ml Enterobacter cloacae  --  Enterobacter cloacae      .Blood Blood  01-04-20   No growth at 5 days.  --  --      .Tissue Other  01-01-20   Numerous Enterobacter cloacae complex  --  Enterobacter cloacae complex      .Surgical Swab collection around right kidney  01-01-20   Moderate Enterobacter cloacae complex  --  Enterobacter cloacae complex      .Body Fluid periphrenic collection  01-01-20   **Please Note**: This is a Corrected Report**  Moderate Enterobacter cloacae complex  Moderate Escherichia coli  Previously reported as:  Moderate Enterobacter cloacae (Carbapenem Resistant)  Moderate Escherichia coli  --  Escherichia coli  Enterobacter cloacae complex      .Surgical Swab right kidney abscess  01-01-20   Moderate Enterobacter cloacae complex  --  Enterobacter cloacae complex      .Urine Clean Catch (Midstream)  12-29-19   >100,000 CFU/ml Enterobacter cloacae  --  Enterobacter cloacae      .Blood Blood-Peripheral  12-27-19   No growth at 5 days.  --  --          CMVPCR Log: NotDetec Cjc29HC/mL (01-14 @ 08:37)        RADIOLOGY:    <The imaging below has been reviewed and visualized me independently>    EXAM:  XR CHEST PORTABLE ROUTINE 1V                        PROCEDURE DATE:  01/17/2020    New bilateral pulmonary vascular congestive changes.  Small, bilateral pleural effusions with bibasilar atelectasis.    EXAM:  US ABDOMEN LIMITED                        PROCEDURE DATE:  01/15/2020    13.7 x 5.0 x 1.5 cm fluid collection with complexity in the right lower quadrant transplant bed.

## 2020-01-17 NOTE — PROGRESS NOTE ADULT - ASSESSMENT
49y Male with ESRD s/p DDRT on 12/8/19 with course complicated by delayed graft function requiring HD, and infected perinephric hematoma s/p washout on 1/1/2020 with worsening sepsis and transfer to ICU, s/p IR drainage of perinephric collection on 1/6. 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 He underwent operative repair of right external iliac artery with bovine patch, transplant nephrectomy, RLE thrombectomy, and IVC filter on 1/10. Post-op course complicated by cardiac arrest secondary to saddle PE.    PLAN:  Neuro:  acute post-op pain  - PRN Dilaudid    Resp: acute hypoxemic respiratory failure, saddle PE  - Continuous pulse oximetry  - f/u am CXR  - continue heparin gtt for PE     CV: obstructive shock secondary to PE, bradycardia -> PEA arrest -> torsades  - Continue PO amiodarone   - Troponins downtrending.   - Daily EKGs while on amiodarone.   - Follow up official echo.     GI: no acute issues  - Renal diet   - Protonix for stress ulcer prophylaxis    Renal: CKD stage V s/p DDRT c/b DGF & perinephric collection, s/p explantation of DDRT due to pseudoaneurysm of right external iliac and transplanted renal artery  - S/p percutaneous drainage of R iliac fossa fluid collection by IR today with 10 Fr catheter left in place.  - Monitor I&Os  - Monitor electrolytes and replete as necessary  - NS at 50 mL/hr while NPO and on vasopressor support  - Appreciate nephrology recommendations, hemodialysis as per nephrology    Heme: right external iliac & femoral DVT s/p thrombectomy & IVC filter, saddle PE  - Monitor CBC and coags  - Heparin infusion for saddle PE  - Appreciate PERT, vascular cardiology, and CT surgery recommendations    ID: infected perinephric hematoma  - Monitor WBC, temperature, and procalcitonin  - Bladder hematoma & RP hematoma on 1/10 with carbapenem resistant Enterobacter cloacae  - Empiric antibiotics with meropenem    Endo: no acute issues  - Methylprednisolone 4mg daily stress dose.     Misc:  - Brimonidine/latanoprost drops    Disposition:  - Full code  - Will remain in SICU

## 2020-01-17 NOTE — PROGRESS NOTE ADULT - SUBJECTIVE AND OBJECTIVE BOX
Interventional Radiology Follow- Up Note    49y Male s/p drainage of pelvic fluid collection on 1/15  in Interventional Radiology with Dr. Bro.    Pt seen and examined in SICU. On bedside dialysis.      Vitals: T(F): 98 (01-17-20 @ 09:30), Max: 99.9 (01-16-20 @ 19:00)  HR: 76 (01-17-20 @ 10:00) (75 - 86)  BP: 114/64 (01-17-20 @ 07:00) (114/64 - 114/64)  RR: 19 (01-17-20 @ 10:00) (15 - 34)  SpO2: 99% (01-17-20 @ 10:00) (92% - 100%)  Wt(kg): --    LABS:                        8.7    29.14 )-----------( 352      ( 17 Jan 2020 03:48 )             26.4     01-17    135  |  98  |  22  ----------------------------<  118<H>  3.7   |  21<L>  |  5.15<H>    Ca    7.9<L>      17 Jan 2020 03:48  Phos  3.8     01-17  Mg     2.0     01-17    TPro  5.1<L>  /  Alb  2.2<L>  /  TBili  1.3<H>  /  DBili  0.9<H>  /  AST  11  /  ALT  5<L>  /  AlkPhos  178<H>  01-17    PT/INR - ( 17 Jan 2020 03:48 )   PT: 17.2 sec;   INR: 1.48 ratio         PTT - ( 17 Jan 2020 03:48 )  PTT:79.6 sec      PHYSICAL EXAM:  General: Nontoxic, in NAD  Neuro:  Alert & oriented x 3  Abdomen: soft, NTND. RLQ dressing c/d/i. Drain with serosanguinous output. Draine with 295cc/24hr documented output.     Impression: 48y/o M with hx of DDRT s/p hematoma evacuation/ abd washout and renal biopsy. Post op course c/b leukocytosis, infected hematoma, acute RLE DVT, and bleeding pseudoaneurysm at txp renal artery anastomosis. Now s/p graft nephrectomy with pelvic collection s/p drainage of collection.      Plan:  - IV antibiotics   - Continue to monitor out put.  - Flush drain per doctor orders.  - Trend vitals, labs.  - Care per SICU and transplant team.  - Will discuss with IR attending.     Please call IR at extension [phone] with any questions, concerns, or issues regarding above.

## 2020-01-17 NOTE — PROGRESS NOTE ADULT - ASSESSMENT
49y Male with ESRD s/p DDRT on 12/8/19 with course complicated by delayed graft function requiring HD, and infected perinephric hematoma s/p washout on 1/1/2020 with worsening sepsis and transfer to ICU, s/p IR drainage of perinephric collection on 1/6. 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 He underwent operative repair of right external iliac artery with pericardial patch, transplant nephrectomy, RLE thrombectomy, and IVC filter on 1/9.     - c/w heparin gtt  - Recommend CT scan to look for residual collection    - Recommend Plastics consult for rectus flap to cover the arteriotomy patch repair site toprevent arterial blow out  - Routine neurovascular checks  - Care as per SICU and transplant teams    VASCULAR [id number] 49y Male with ESRD s/p DDRT on 12/8/19 with course complicated by delayed graft function requiring HD, and infected perinephric hematoma s/p washout on 1/1/2020 with worsening sepsis and transfer to ICU, s/p IR drainage of perinephric collection on 1/6. 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 He underwent operative repair of right external iliac artery with pericardial patch, transplant nephrectomy, RLE thrombectomy, and IVC filter on 1/9.     - c/w heparin gtt  - Recommend CT scan to look for residual collection    - Recommend Plastics consult for rectus flap to cover the arteriotomy patch repair site to prevent arterial blow out  - Routine neurovascular checks  - Care as per SICU and transplant teams    VASCULAR [phone] negative...  used

## 2020-01-17 NOTE — PROGRESS NOTE ADULT - PROBLEM SELECTOR PLAN 5
Pt. with acute cardiopulmonary decompensation in the setting of saddle embolus on 1/14/20. Pt. now extubated and hemodynamically stable. Continue anticoagulation. Monitor closely

## 2020-01-17 NOTE — PROGRESS NOTE ADULT - SUBJECTIVE AND OBJECTIVE BOX
VASCULAR SURGERY DAILY PROGRESS NOTE:       SUBJECTIVE/ROS: Patient seen and examined at bedside.  Patient is extubated, and reports adequate pain control.      MEDICATIONS  (STANDING):  aMIOdarone    Tablet 200 milliGRAM(s) Oral two times a day  brimonidine 0.2% Ophthalmic Solution 1 Drop(s) Both EYES three times a day  chlorhexidine 2% Cloths 1 Application(s) Topical <User Schedule>  dextrose 5% + sodium chloride 0.9%. 1000 milliLiter(s) (30 mL/Hr) IV Continuous <Continuous>  heparin  Infusion 1000 Unit(s)/Hr (12 mL/Hr) IV Continuous <Continuous>  latanoprost 0.005% Ophthalmic Solution 1 Drop(s) Both EYES daily  meropenem  IVPB 500 milliGRAM(s) IV Intermittent every 24 hours  methylPREDNISolone sodium succinate Injectable 4 milliGRAM(s) IV Push daily  Nephro-candace 1 Tablet(s) Oral daily  pantoprazole  Injectable 40 milliGRAM(s) IV Push daily  petrolatum Ophthalmic Ointment 1 Application(s) Both EYES daily    MEDICATIONS  (PRN):  HYDROmorphone  Injectable 0.5 milliGRAM(s) IV Push every 3 hours PRN Breakthrough Pain      OBJECTIVE:    Vital Signs Last 24 Hrs  T(C): 36.7 (2020 09:30), Max: 37.7 (2020 19:00)  T(F): 98 (2020 09:30), Max: 99.9 (2020 19:00)  HR: 76 (2020 10:00) (75 - 86)  BP: 114/64 (2020 07:00) (114/64 - 114/64)  BP(mean): 83 (2020 07:00) (83 - 83)  RR: 19 (2020 10:00) (15 - 34)  SpO2: 99% (2020 10:00) (92% - 99%)        I&O's Detail    2020 07:01  -  2020 07:00  --------------------------------------------------------  IN:    amiodarone Infusion: 367.4 mL    dextrose 5% + sodium chloride 0.9%.: 1200 mL    heparin Infusion: 270 mL    Solution: 500 mL  Total IN: 2337.4 mL    OUT:    Bulb: 295 mL    VAC (Vacuum Assisted Closure) System: 200 mL  Total OUT: 495 mL    Total NET: 1842.4 mL      2020 07:01  -  2020 11:47  --------------------------------------------------------  IN:    dextrose 5% + sodium chloride 0.9%.: 90 mL    heparin Infusion: 36 mL  Total IN: 126 mL    OUT:  Total OUT: 0 mL    Total NET: 126 mL          Daily     Daily Weight in k.5 (2020 09:30)    LABS:                        8.7    29.14 )-----------( 352      ( 2020 03:48 )             26.4         135  |  98  |  22  ----------------------------<  118<H>  3.7   |  21<L>  |  5.15<H>    Ca    7.9<L>      2020 03:48  Phos  3.8       Mg     2.0         TPro  5.1<L>  /  Alb  2.2<L>  /  TBili  1.3<H>  /  DBili  0.9<H>  /  AST  11  /  ALT  5<L>  /  AlkPhos  178<H>      PT/INR - ( 2020 03:48 )   PT: 17.2 sec;   INR: 1.48 ratio         PTT - ( 2020 03:48 )  PTT:79.6 sec              PHYSICAL EXAM:  Physical Exam  Constitutional: NAD  Respiratory: INTUBATED  Abd: soft, distended, RL abdominal wound vac in place with appropriate suction.    Ext: right LE edema resolving, dopplerable DP

## 2020-01-17 NOTE — PROGRESS NOTE ADULT - PROBLEM SELECTOR PLAN 2
- Leukocytosis stable today; recommend monitoring trend. Follow up ID team recommendations.  - On methylprenisonolone per SICU team; consider tapering off

## 2020-01-17 NOTE — PROGRESS NOTE ADULT - SUBJECTIVE AND OBJECTIVE BOX
[facility] -- Melissa Kelsey, Maximino Nunez Pannella, Patel, Savella  Office # [phone]      Follow Up:  resp failure    Subjective/Observations: Patient seen and examined. Events noted. Resting comfortably in chair. Extubated. Coughing. No complaints of chest pain,   or palpitations reported. No signs of orthopnea or PND.       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  Carpal tunnel syndrome  Brain cyst: had MRI recently- now gone  Anemia  Gastric ulcer: Long time ago  Rheumatoid arthritis  Renal transplant recipient  Breakdown (mechanical) of penile (implanted) prosthesis, sequela  End-stage renal disease (ESRD): PERMACATH PLACEMENT  Abscess of left buttock: s/p I &amp; D  AV fistula: placement left lower arm  S/P cystoscopy: stent placement &amp; removal for kidney stones, lithiotripsy  s/p Lt Carpal tunnel: 2011      MEDICATIONS  (STANDING):  aMIOdarone    Tablet 200 milliGRAM(s) Oral two times a day  brimonidine 0.2% Ophthalmic Solution 1 Drop(s) Both EYES three times a day  chlorhexidine 2% Cloths 1 Application(s) Topical <User Schedule>  dextrose 5% + sodium chloride 0.9%. 1000 milliLiter(s) (30 mL/Hr) IV Continuous <Continuous>  heparin  Infusion 1000 Unit(s)/Hr (12 mL/Hr) IV Continuous <Continuous>  latanoprost 0.005% Ophthalmic Solution 1 Drop(s) Both EYES daily  meropenem  IVPB 500 milliGRAM(s) IV Intermittent every 24 hours  methylPREDNISolone sodium succinate Injectable 4 milliGRAM(s) IV Push daily  pantoprazole  Injectable 40 milliGRAM(s) IV Push daily  petrolatum Ophthalmic Ointment 1 Application(s) Both EYES daily    MEDICATIONS  (PRN):  HYDROmorphone  Injectable 0.5 milliGRAM(s) IV Push every 3 hours PRN Breakthrough Pain      Allergies    coconut (Anaphylaxis)  morphine (Pruritus; Rash)    Intolerances            Vital Signs Last 24 Hrs  T(C): 37.4 (17 Jan 2020 07:00), Max: 37.7 (16 Jan 2020 19:00)  T(F): 99.3 (17 Jan 2020 07:00), Max: 99.9 (16 Jan 2020 19:00)  HR: 76 (17 Jan 2020 07:00) (75 - 86)  BP: 114/64 (17 Jan 2020 07:00) (114/64 - 114/64)  BP(mean): 83 (17 Jan 2020 07:00) (83 - 83)  RR: 23 (17 Jan 2020 07:00) (15 - 28)  SpO2: 92% (17 Jan 2020 07:00) (92% - 100%)    I&O's Summary    16 Jan 2020 07:01  -  17 Jan 2020 07:00  --------------------------------------------------------  IN: 2337.4 mL / OUT: 495 mL / NET: 1842.4 mL    17 Jan 2020 07:01  -  17 Jan 2020 09:13  --------------------------------------------------------  IN: 42 mL / OUT: 0 mL / NET: 42 mL          PHYSICAL EXAM:  TELE:   Constitutional: NAD, awake    HEENT: Moist Mucous Membranes, Anicteric  Pulmonary: Decreased breath sounds b/l. No rales, crackles or wheeze appreciated.   Cardiovascular: Regular, S1 and S2, No murmurs, rubs, gallops or clicks  Gastrointestinal: Bowel Sounds present, soft, nontender.   Lymph: + peripheral edema. No lymphadenopathy.  Skin: No visible rashes or ulcers.  Psych:  Mood & affect appropriate for situation    LABS: All Labs Reviewed:                        8.7    29.14 )-----------( 352      ( 17 Jan 2020 03:48 )             26.4                         9.0    33.58 )-----------( 349      ( 16 Jan 2020 21:22 )             26.7                         8.7    33.21 )-----------( 354      ( 16 Jan 2020 13:49 )             27.3     17 Jan 2020 03:48    135    |  98     |  22     ----------------------------<  118    3.7     |  21     |  5.15   16 Jan 2020 21:22    135    |  95     |  20     ----------------------------<  105    3.9     |  21     |  4.87   16 Jan 2020 13:49    135    |  96     |  18     ----------------------------<  129    3.8     |  24     |  4.71     Ca    7.9        17 Jan 2020 03:48  Ca    7.9        16 Jan 2020 21:22  Ca    8.0        16 Jan 2020 13:49  Phos  3.8       17 Jan 2020 03:48  Phos  3.6       16 Jan 2020 21:22  Phos  3.9       16 Jan 2020 13:49  Mg     2.0       17 Jan 2020 03:48  Mg     2.0       16 Jan 2020 21:22  Mg     2.1       16 Jan 2020 13:49    TPro  5.1    /  Alb  2.2    /  TBili  1.3    /  DBili  0.9    /  AST  11     /  ALT  5      /  AlkPhos  178    17 Jan 2020 03:48  TPro  5.2    /  Alb  2.3    /  TBili  1.3    /  DBili  0.9    /  AST  13     /  ALT  <5     /  AlkPhos  185    16 Jan 2020 21:22  TPro  5.4    /  Alb  2.4    /  TBili  1.3    /  DBili  0.8    /  AST  15     /  ALT  <5     /  AlkPhos  176    16 Jan 2020 13:49    PT/INR - ( 17 Jan 2020 03:48 )   PT: 17.2 sec;   INR: 1.48 ratio         PTT - ( 17 Jan 2020 03:48 )  PTT:79.6 sec

## 2020-01-17 NOTE — PROGRESS NOTE ADULT - SUBJECTIVE AND OBJECTIVE BOX
Patient  is  extubated  alert  oriented  On IV heparin  WBC  still very high  The  only cultures  that grew  were from the  Bladder  and  RP hematoma  Arterial  wall  cultures  negative  Fluid  from  IR drainage  No growth  I  suggest  scanning  the  patient  looking  for  residual collection  Plastics  consult  for  rectus  flap to cover the  arteriotomy  patch repair  site  to prevent  arterial blow out  CT  chest  abdomen pelvis  would  be  helpful  I spoke  to transplant  team and  ICU  On Elpidio

## 2020-01-17 NOTE — PROGRESS NOTE ADULT - SUBJECTIVE AND OBJECTIVE BOX
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 Pt. overall with complicated hospital course, including persistent hematuria, RLE DVT, and DGF. Had transplant nephrectomy along with endovascular repair and IVC filter placement on 1/10/20. In the past 24 hours, pt. had multiple cardiac events 2/2 saddle PE, despite IVC filter placement and heparin gtt. Pt. successfully extubated yesterday.    Pt. seen and examined at bedside this AM in SICU. Pt. states that he is tired. Denies CP, SOB, N/V/F/C.  PAST HISTORY  --------------------------------------------------------------------------------  No significant changes to PMH, PSH, FHx, SHx, unless otherwise noted    ALLERGIES & MEDICATIONS  --------------------------------------------------------------------------------  Allergies    coconut (Anaphylaxis)  morphine (Pruritus; Rash)    Intolerances    Standing Inpatient Medications  aMIOdarone    Tablet 200 milliGRAM(s) Oral two times a day  brimonidine 0.2% Ophthalmic Solution 1 Drop(s) Both EYES three times a day  chlorhexidine 2% Cloths 1 Application(s) Topical <User Schedule>  dextrose 5% + sodium chloride 0.9%. 1000 milliLiter(s) IV Continuous <Continuous>  heparin  Infusion 1000 Unit(s)/Hr IV Continuous <Continuous>  latanoprost 0.005% Ophthalmic Solution 1 Drop(s) Both EYES daily  meropenem  IVPB 500 milliGRAM(s) IV Intermittent every 24 hours  methylPREDNISolone sodium succinate Injectable 4 milliGRAM(s) IV Push daily  pantoprazole  Injectable 40 milliGRAM(s) IV Push daily  petrolatum Ophthalmic Ointment 1 Application(s) Both EYES daily    REVIEW OF SYSTEMS  --------------------------------------------------------------------------------  Gen: + lethargy, + fatigue  Respiratory: No dyspnea  CV: No chest pain  GI: No abdominal pain  MSK: + LE edema  Neuro: No dizziness  Heme: No bleeding    All other systems were reviewed and are negative, except as noted.    VITALS/PHYSICAL EXAM  --------------------------------------------------------------------------------  T(C): 37.3 (01-17-20 @ 03:00), Max: 37.7 (01-16-20 @ 19:00)  HR: 76 (01-17-20 @ 05:00) (75 - 86)  BP: --  RR: 23 (01-17-20 @ 05:00) (15 - 28)  SpO2: 95% (01-17-20 @ 05:00) (92% - 100%)  Wt(kg): --    01-15-20 @ 07:01  -  01-16-20 @ 07:00  --------------------------------------------------------  IN: 2959.8 mL / OUT: 2935 mL / NET: 24.8 mL    01-16-20 @ 07:01  -  01-17-20 @ 06:31  --------------------------------------------------------  IN: 2275.4 mL / OUT: 425 mL / NET: 1850.4 mL    Physical Exam:  	Gen: NAD, resting  	HEENT: supple neck  	Pulm: decreased breath sounds bilaterally  	CV: RRR, S1S2; no rub  	Abd: +BS, soft, RLQ with drain in place  	: No suprapubic tenderness  	LE: b/l pitting edema, R>L (improved)  LABS/STUDIES  --------------------------------------------------------------------------------              8.7    29.14 >-----------<  352      [01-17-20 @ 03:48]              26.4     135  |  98  |  22  ----------------------------<  118      [01-17-20 @ 03:48]  3.7   |  21  |  5.15        Ca     7.9     [01-17-20 @ 03:48]      Mg     2.0     [01-17-20 @ 03:48]      Phos  3.8     [01-17-20 @ 03:48]    Creatinine Trend:  SCr 5.15 [01-17 @ 03:48]  SCr 4.87 [01-16 @ 21:22]  SCr 4.71 [01-16 @ 13:49]  SCr 4.40 [01-16 @ 07:55]  SCr 3.91 [01-16 @ 00:39]

## 2020-01-17 NOTE — PROGRESS NOTE ADULT - ASSESSMENT
49M PMHx of ESRD on HD  ( via LUE AVF), HTN, Gout, Glaucoma, NET of pancreas, RA with hand contractures. s/p DDRT 12/8/19,  Post op course c/b DGF requiring HD. Renal bx x 2 c/w profound ATN.     Re-admitted with Influenza and perinephric hematoma. s/p hematoma evacuation/ abd washout and renal biopsy. Post op course c/b leukocytosis, infected hematoma, acute RLE DVT, and bleeding pseudoaneurysm at txp renal artery anastomosis. Now s/p graft nephrectomy. Course further complicated by saddle PE causing right heart strain and hemodynamic instability leading to cardiac arrest requiring ACLS, now with ROSC and intubated    [] DDRT c/b DGF/ATN/perinephric infected hematoma/pseudoneurysm/graft nephrectomy   - HD per nephrology team  - s/p bedside IR drainage of RLQ fluid collection (1/15), cxs with NGTD  - off immunosupression, decrease prednisone to 2.5mg QD and will plan to DC soon  - off  valcyte/bactrim  - Worsening leukocytosis: ID following;  cont meropenem, vanco x 1 dose (1/16), off fluc and cipro . CTchest/abd/pelvis with IV contrast today  - received amikacin 1/7 and 1/8   - pain control  - bowel regimen  - PT/OT/OOB      [] Saddle PE, with right heart strain and complicated by PEA arrest followed by Torsade, ROSC achieved after ACLS  - vascular, CT and vascular cardiology following   - continue anticoagulation and no plan for invasive intervention   - repeat Echo reviewed     [] New acute DVT of R external iliac, common femoral, femoral veins, R soleal vein requiring heparin drip  - IVC filter placed 1/9, on heparin drip     [] Dispo  - continue SICU care

## 2020-01-17 NOTE — PROGRESS NOTE ADULT - SUBJECTIVE AND OBJECTIVE BOX
Seen in SICU, extubated, awake, alert    Vital Signs Last 24 Hrs  T(C): 36.9 (01-17-20 @ 12:30), Max: 37.7 (01-16-20 @ 19:00)  T(F): 98.5 (01-17-20 @ 12:30), Max: 99.9 (01-16-20 @ 19:00)  HR: 80 (01-17-20 @ 14:00) (74 - 86)  BP: 114/64 (01-17-20 @ 07:00) (114/64 - 114/64)  BP(mean): 83 (01-17-20 @ 07:00) (83 - 83)  RR: 31 (01-17-20 @ 14:00) (11 - 34)  SpO2: 92% (01-17-20 @ 14:00) (92% - 99%)    Card S1S2  Lungs decr BS b/l  Abd soft  Extr + edema b/l LE R > L, AVF patent                                                                                                                            9.3    31.51 )-----------( 396      ( 17 Jan 2020 12:58 )             28.5     17 Jan 2020 12:58    135    |  97     |  10     ----------------------------<  108    3.9     |  27     |  2.96     Ca    8.6        17 Jan 2020 12:58  Phos  2.4       17 Jan 2020 12:58  Mg     2.0       17 Jan 2020 12:58    TPro  5.9    /  Alb  2.7    /  TBili  1.6    /  DBili  1.1    /  AST  10     /  ALT  <5     /  AlkPhos  198    17 Jan 2020 12:58    LIVER FUNCTIONS - ( 17 Jan 2020 12:58 )  Alb: 2.7 g/dL / Pro: 5.9 g/dL / ALK PHOS: 198 U/L / ALT: <5 U/L / AST: 10 U/L / GGT: x           PT/INR - ( 17 Jan 2020 12:58 )   PT: 16.1 sec;   INR: 1.40 ratio      amiKACIN  IVPB 500 milliGRAM(s) IV Intermittent once  aMIOdarone    Tablet 200 milliGRAM(s) Oral two times a day  brimonidine 0.2% Ophthalmic Solution 1 Drop(s) Both EYES three times a day  chlorhexidine 2% Cloths 1 Application(s) Topical <User Schedule>  dextrose 5% + sodium chloride 0.9%. 1000 milliLiter(s) IV Continuous <Continuous>  fluconAZOLE IVPB 400 milliGRAM(s) IV Intermittent every 24 hours  heparin  Infusion 1000 Unit(s)/Hr IV Continuous <Continuous>  HYDROmorphone  Injectable 0.5 milliGRAM(s) IV Push every 3 hours PRN  latanoprost 0.005% Ophthalmic Solution 1 Drop(s) Both EYES daily  meropenem  IVPB 500 milliGRAM(s) IV Intermittent every 24 hours  Nephro-candace 1 Tablet(s) Oral daily  pantoprazole  Injectable 40 milliGRAM(s) IV Push daily  petrolatum Ophthalmic Ointment 1 Application(s) Both EYES daily    A/P:    Adm w/Flu A 12/27/19  Hx ESRD on HD  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, DGF  S/p transplant kidney bx 12/13: ATN, focal TMA  S/p PLEX 12/13, 12/15; d/c-d 12/20 w/op HD 3 x wk  Re-adm 12/27, dx w/infected carlos-nephric hematoma   S/p OR 1/1 for washout and repeat renal graft biopsy  Repeat renal bx c/w ATN  Dx w/RLE DVT  Developed gross hematuria  S/p OR 1/10 for infected carlos-nephric hematoma, hemorrhage involving pseudo-aneurysm of anastomosis of renal artery to external iliac vein,   s/p transplant nephrectomy, s/p IVCF   S/p PEA arrest 1/13, intubated, s/p CTA, + PE, R retrop collection, on AC  Abx per ID, off pressors  S/p IR drainage of abd collection  S/p stable HD today, 2 kg fluid removed  UF tomorrow  Fluid removal as able  D/w transplant team

## 2020-01-17 NOTE — PROGRESS NOTE ADULT - ATTENDING COMMENTS
Looking better this morning  Sitting in chair, responding appropriately  HD stable, no fevers  no SOB, no abdominal pain  WCC very high 30s    chest - crackles throughout  pigtail drain - HS, no purulence    legs swollen R>L    Plan  keep on diflucan, start amikacin over weekend   CT chest abdo pelvis

## 2020-01-17 NOTE — PROGRESS NOTE ADULT - PROBLEM SELECTOR PLAN 1
s/p DDRT (12/8/19) now s/p transplant nephrectomy. Hospital course complicated yesterday by multiple cardiac events 2/2 saddle PE. Pt. extubated on 1/16/20, and currently hemodynamically stable. Pt. underwent HD treatment (with 2L UF and blood transfusion) on 1/15/20, tolerated well. HD managed by primary nephrologist

## 2020-01-18 DIAGNOSIS — N18.6 END STAGE RENAL DISEASE: ICD-10-CM

## 2020-01-18 LAB
ALBUMIN SERPL ELPH-MCNC: 2.4 G/DL — LOW (ref 3.3–5)
ALP SERPL-CCNC: 207 U/L — HIGH (ref 40–120)
ALT FLD-CCNC: <5 U/L — LOW (ref 10–45)
ANION GAP SERPL CALC-SCNC: 18 MMOL/L — HIGH (ref 5–17)
APTT BLD: 61.7 SEC — HIGH (ref 27.5–36.3)
AST SERPL-CCNC: 9 U/L — LOW (ref 10–40)
BILIRUB DIRECT SERPL-MCNC: 1 MG/DL — HIGH (ref 0–0.2)
BILIRUB INDIRECT FLD-MCNC: 0.5 MG/DL — SIGNIFICANT CHANGE UP (ref 0.2–1)
BILIRUB SERPL-MCNC: 1.5 MG/DL — HIGH (ref 0.2–1.2)
BUN SERPL-MCNC: 17 MG/DL — SIGNIFICANT CHANGE UP (ref 7–23)
CALCIUM SERPL-MCNC: 8.2 MG/DL — LOW (ref 8.4–10.5)
CHLORIDE SERPL-SCNC: 95 MMOL/L — LOW (ref 96–108)
CO2 SERPL-SCNC: 23 MMOL/L — SIGNIFICANT CHANGE UP (ref 22–31)
CREAT SERPL-MCNC: 4.31 MG/DL — HIGH (ref 0.5–1.3)
GAS PNL BLDA: SIGNIFICANT CHANGE UP
GLUCOSE SERPL-MCNC: 110 MG/DL — HIGH (ref 70–99)
GRAM STN FLD: SIGNIFICANT CHANGE UP
HCT VFR BLD CALC: 27.3 % — LOW (ref 39–50)
HGB BLD-MCNC: 8.8 G/DL — LOW (ref 13–17)
INR BLD: 1.38 RATIO — HIGH (ref 0.88–1.16)
MAGNESIUM SERPL-MCNC: 2 MG/DL — SIGNIFICANT CHANGE UP (ref 1.6–2.6)
MCHC RBC-ENTMCNC: 29.3 PG — SIGNIFICANT CHANGE UP (ref 27–34)
MCHC RBC-ENTMCNC: 32.2 GM/DL — SIGNIFICANT CHANGE UP (ref 32–36)
MCV RBC AUTO: 91 FL — SIGNIFICANT CHANGE UP (ref 80–100)
NRBC # BLD: 0 /100 WBCS — SIGNIFICANT CHANGE UP (ref 0–0)
PHOSPHATE SERPL-MCNC: 3.5 MG/DL — SIGNIFICANT CHANGE UP (ref 2.5–4.5)
PLATELET # BLD AUTO: 403 K/UL — HIGH (ref 150–400)
POTASSIUM SERPL-MCNC: 3.8 MMOL/L — SIGNIFICANT CHANGE UP (ref 3.5–5.3)
POTASSIUM SERPL-SCNC: 3.8 MMOL/L — SIGNIFICANT CHANGE UP (ref 3.5–5.3)
PROCALCITONIN SERPL-MCNC: 7.47 NG/ML — HIGH (ref 0.02–0.1)
PROT SERPL-MCNC: 5.3 G/DL — LOW (ref 6–8.3)
PROTHROM AB SERPL-ACNC: 15.9 SEC — HIGH (ref 10–12.9)
RBC # BLD: 3 M/UL — LOW (ref 4.2–5.8)
RBC # FLD: 18.5 % — HIGH (ref 10.3–14.5)
SODIUM SERPL-SCNC: 136 MMOL/L — SIGNIFICANT CHANGE UP (ref 135–145)
SPECIMEN SOURCE: SIGNIFICANT CHANGE UP
WBC # BLD: 27.27 K/UL — HIGH (ref 3.8–10.5)
WBC # FLD AUTO: 27.27 K/UL — HIGH (ref 3.8–10.5)

## 2020-01-18 PROCEDURE — 99233 SBSQ HOSP IP/OBS HIGH 50: CPT

## 2020-01-18 PROCEDURE — 99232 SBSQ HOSP IP/OBS MODERATE 35: CPT

## 2020-01-18 PROCEDURE — 71045 X-RAY EXAM CHEST 1 VIEW: CPT | Mod: 26

## 2020-01-18 PROCEDURE — 99291 CRITICAL CARE FIRST HOUR: CPT

## 2020-01-18 RX ORDER — VANCOMYCIN HCL 1 G
500 VIAL (EA) INTRAVENOUS
Refills: 0 | Status: DISCONTINUED | OUTPATIENT
Start: 2020-01-18 | End: 2020-01-22

## 2020-01-18 RX ORDER — OXYCODONE HYDROCHLORIDE 5 MG/1
5 TABLET ORAL EVERY 4 HOURS
Refills: 0 | Status: DISCONTINUED | OUTPATIENT
Start: 2020-01-18 | End: 2020-01-24

## 2020-01-18 RX ORDER — ALLOPURINOL 300 MG
100 TABLET ORAL DAILY
Refills: 0 | Status: DISCONTINUED | OUTPATIENT
Start: 2020-01-18 | End: 2020-02-14

## 2020-01-18 RX ORDER — AMIODARONE HYDROCHLORIDE 400 MG/1
200 TABLET ORAL DAILY
Refills: 0 | Status: DISCONTINUED | OUTPATIENT
Start: 2020-01-27 | End: 2020-02-14

## 2020-01-18 RX ORDER — GABAPENTIN 400 MG/1
100 CAPSULE ORAL EVERY 12 HOURS
Refills: 0 | Status: DISCONTINUED | OUTPATIENT
Start: 2020-01-18 | End: 2020-02-14

## 2020-01-18 RX ORDER — HYDROMORPHONE HYDROCHLORIDE 2 MG/ML
0.5 INJECTION INTRAMUSCULAR; INTRAVENOUS; SUBCUTANEOUS
Refills: 0 | Status: DISCONTINUED | OUTPATIENT
Start: 2020-01-18 | End: 2020-01-24

## 2020-01-18 RX ORDER — FLUCONAZOLE 150 MG/1
200 TABLET ORAL EVERY 24 HOURS
Refills: 0 | Status: DISCONTINUED | OUTPATIENT
Start: 2020-01-18 | End: 2020-01-18

## 2020-01-18 RX ORDER — ACETAMINOPHEN 500 MG
975 TABLET ORAL EVERY 6 HOURS
Refills: 0 | Status: DISCONTINUED | OUTPATIENT
Start: 2020-01-18 | End: 2020-02-14

## 2020-01-18 RX ORDER — FLUCONAZOLE 150 MG/1
400 TABLET ORAL EVERY 24 HOURS
Refills: 0 | Status: DISCONTINUED | OUTPATIENT
Start: 2020-01-18 | End: 2020-01-20

## 2020-01-18 RX ORDER — VANCOMYCIN HCL 1 G
500 VIAL (EA) INTRAVENOUS
Refills: 0 | Status: DISCONTINUED | OUTPATIENT
Start: 2020-01-18 | End: 2020-01-18

## 2020-01-18 RX ORDER — VANCOMYCIN HCL 1 G
500 VIAL (EA) INTRAVENOUS ONCE
Refills: 0 | Status: COMPLETED | OUTPATIENT
Start: 2020-01-18 | End: 2020-01-18

## 2020-01-18 RX ORDER — CALCIUM GLUCONATE 100 MG/ML
2 VIAL (ML) INTRAVENOUS ONCE
Refills: 0 | Status: COMPLETED | OUTPATIENT
Start: 2020-01-18 | End: 2020-01-18

## 2020-01-18 RX ADMIN — GABAPENTIN 100 MILLIGRAM(S): 400 CAPSULE ORAL at 06:17

## 2020-01-18 RX ADMIN — OXYCODONE HYDROCHLORIDE 5 MILLIGRAM(S): 5 TABLET ORAL at 21:52

## 2020-01-18 RX ADMIN — BRIMONIDINE TARTRATE 1 DROP(S): 2 SOLUTION/ DROPS OPHTHALMIC at 14:00

## 2020-01-18 RX ADMIN — FLUCONAZOLE 100 MILLIGRAM(S): 150 TABLET ORAL at 12:10

## 2020-01-18 RX ADMIN — MEROPENEM 100 MILLIGRAM(S): 1 INJECTION INTRAVENOUS at 20:55

## 2020-01-18 RX ADMIN — OXYCODONE HYDROCHLORIDE 5 MILLIGRAM(S): 5 TABLET ORAL at 22:22

## 2020-01-18 RX ADMIN — AMIODARONE HYDROCHLORIDE 200 MILLIGRAM(S): 400 TABLET ORAL at 18:06

## 2020-01-18 RX ADMIN — BRIMONIDINE TARTRATE 1 DROP(S): 2 SOLUTION/ DROPS OPHTHALMIC at 06:19

## 2020-01-18 RX ADMIN — BRIMONIDINE TARTRATE 1 DROP(S): 2 SOLUTION/ DROPS OPHTHALMIC at 21:52

## 2020-01-18 RX ADMIN — Medication 1 TABLET(S): at 13:59

## 2020-01-18 RX ADMIN — CHLORHEXIDINE GLUCONATE 1 APPLICATION(S): 213 SOLUTION TOPICAL at 06:17

## 2020-01-18 RX ADMIN — LATANOPROST 1 DROP(S): 0.05 SOLUTION/ DROPS OPHTHALMIC; TOPICAL at 12:11

## 2020-01-18 RX ADMIN — AMIODARONE HYDROCHLORIDE 200 MILLIGRAM(S): 400 TABLET ORAL at 06:17

## 2020-01-18 RX ADMIN — Medication 100 MILLIGRAM(S): at 12:11

## 2020-01-18 RX ADMIN — Medication 100 MILLIGRAM(S): at 21:49

## 2020-01-18 RX ADMIN — Medication 200 GRAM(S): at 06:18

## 2020-01-18 RX ADMIN — GABAPENTIN 100 MILLIGRAM(S): 400 CAPSULE ORAL at 18:06

## 2020-01-18 RX ADMIN — OXYCODONE HYDROCHLORIDE 5 MILLIGRAM(S): 5 TABLET ORAL at 09:40

## 2020-01-18 RX ADMIN — OXYCODONE HYDROCHLORIDE 5 MILLIGRAM(S): 5 TABLET ORAL at 09:00

## 2020-01-18 NOTE — PROGRESS NOTE ADULT - SUBJECTIVE AND OBJECTIVE BOX
Surgery Progress Note  Patient is a 49y old  Male who presents with a chief complaint of Fever (19 Jan 2020 02:02)      SUBJECTIVE: Patient seen and examined at bedside with surgical team, patient without complaints.     24 HOUR EVENTS:   - Dosed 1x Amikacin, restarted Fluconazole 400mg per Transplant.   - No acute events overnight.    Vital Signs Last 24 Hrs  T(C): 36.9 (19 Jan 2020 03:00), Max: 37.5 (18 Jan 2020 15:00)  T(F): 98.4 (19 Jan 2020 03:00), Max: 99.5 (18 Jan 2020 15:00)  HR: 75 (19 Jan 2020 06:00) (64 - 80)  BP: --  BP(mean): --  RR: 19 (19 Jan 2020 06:00) (16 - 29)  SpO2: 98% (19 Jan 2020 06:00) (95% - 100%)    Physical Exam  Constitutional: NAD  Respiratory: breathing comfortably on RA  Abd: soft, NT, ND  Ext: weeping b/l LE edema     I&O's Detail    18 Jan 2020 07:01  -  19 Jan 2020 07:00  --------------------------------------------------------  IN:    dextrose 5% + sodium chloride 0.9%.: 230 mL    heparin Infusion: 276 mL    Oral Fluid: 150 mL    Other: 600 mL    Solution: 350 mL  Total IN: 1606 mL    OUT:    Bulb: 250 mL    Other: 2600 mL  Total OUT: 2850 mL    Total NET: -1244 mL      MEDICATIONS  (STANDING):  allopurinol 100 milliGRAM(s) Oral daily  aMIOdarone    Tablet 200 milliGRAM(s) Oral two times a day  brimonidine 0.2% Ophthalmic Solution 1 Drop(s) Both EYES three times a day  chlorhexidine 2% Cloths 1 Application(s) Topical <User Schedule>  dextrose 5% + sodium chloride 0.9%. 1000 milliLiter(s) (10 mL/Hr) IV Continuous <Continuous>  fluconAZOLE IVPB 400 milliGRAM(s) IV Intermittent every 24 hours  gabapentin 100 milliGRAM(s) Oral every 12 hours  heparin  Infusion 1000 Unit(s)/Hr (14 mL/Hr) IV Continuous <Continuous>  latanoprost 0.005% Ophthalmic Solution 1 Drop(s) Both EYES daily  meropenem  IVPB 500 milliGRAM(s) IV Intermittent every 24 hours  Nephro-candace 1 Tablet(s) Oral daily  vancomycin  IVPB 500 milliGRAM(s) IV Intermittent <User Schedule>    MEDICATIONS  (PRN):  acetaminophen   Tablet .. 975 milliGRAM(s) Oral every 6 hours PRN Mild Pain (1 - 3)  HYDROmorphone  Injectable 0.5 milliGRAM(s) IV Push every 3 hours PRN Severe Pain (7 - 10)  oxyCODONE    IR 5 milliGRAM(s) Oral every 4 hours PRN Moderate Pain (4 - 6)      LABS:                        8.1    16.77 )-----------( 397      ( 19 Jan 2020 01:39 )             25.2     01-19    134<L>  |  97  |  22  ----------------------------<  107<H>  3.8   |  24  |  5.28<H>    Ca    8.2<L>      19 Jan 2020 01:39  Phos  3.8     01-19  Mg     2.0     01-19    TPro  5.2<L>  /  Alb  2.1<L>  /  TBili  1.4<H>  /  DBili  1.0<H>  /  AST  9<L>  /  ALT  <5<L>  /  AlkPhos  203<H>  01-19    PT/INR - ( 19 Jan 2020 01:39 )   PT: 15.2 sec;   INR: 1.31 ratio         PTT - ( 19 Jan 2020 01:39 )  PTT:56.6 sec  LIVER FUNCTIONS - ( 19 Jan 2020 01:39 )  Alb: 2.1 g/dL / Pro: 5.2 g/dL / ALK PHOS: 203 U/L / ALT: <5 U/L / AST: 9 U/L / GGT: x

## 2020-01-18 NOTE — PROGRESS NOTE ADULT - PROBLEM SELECTOR PLAN 1
S/p HD yesterday. Significant fluid overload present on exam. Electroltyes fair. HD today with 2 kg UF as tolerated.

## 2020-01-18 NOTE — PROGRESS NOTE ADULT - SUBJECTIVE AND OBJECTIVE BOX
· Subjective and Objective:   [facility] -- Melissa Kelsey, Enrique, Maximino, Hamzah Funes Savella, Goodger  Office # [phone]      Follow Up:  resp failure    Subjective/Observations: Patient seen and examined. Events noted. Resting comfortably in chair. Extubated. Coughing. No complaints of chest pain,   or palpitations reported. No signs of orthopnea or PND.       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  Carpal tunnel syndrome  Brain cyst: had MRI recently- now gone  Anemia  Gastric ulcer: Long time ago  Rheumatoid arthritis  Renal transplant recipient  Breakdown (mechanical) of penile (implanted) prosthesis, sequela  End-stage renal disease (ESRD): PERMACATH PLACEMENT  Abscess of left buttock: s/p I &amp; D  AV fistula: placement left lower arm  S/P cystoscopy: stent placement &amp; removal for kidney stones, lithiotripsy  s/p Lt Carpal tunnel:       MEDICATIONS  (STANDING):  allopurinol 100 milliGRAM(s) Oral daily  aMIOdarone    Tablet 200 milliGRAM(s) Oral two times a day  brimonidine 0.2% Ophthalmic Solution 1 Drop(s) Both EYES three times a day  chlorhexidine 2% Cloths 1 Application(s) Topical <User Schedule>  dextrose 5% + sodium chloride 0.9%. 1000 milliLiter(s) (10 mL/Hr) IV Continuous <Continuous>  fluconAZOLE IVPB 400 milliGRAM(s) IV Intermittent every 24 hours  gabapentin 100 milliGRAM(s) Oral every 12 hours  heparin  Infusion 1000 Unit(s)/Hr (12 mL/Hr) IV Continuous <Continuous>  latanoprost 0.005% Ophthalmic Solution 1 Drop(s) Both EYES daily  meropenem  IVPB 500 milliGRAM(s) IV Intermittent every 24 hours  Nephro-candace 1 Tablet(s) Oral daily      Allergies    coconut (Anaphylaxis)  morphine (Pruritus; Rash)    Intolerances                              8.8    27.27 )-----------( 403      ( 2020 01:32 )             27.3       01-18    136  |  95<L>  |  17  ----------------------------<  110<H>  3.8   |  23  |  4.31<H>    Ca    8.2<L>      2020 01:36  Phos  3.5       Mg     2.0         TPro  5.3<L>  /  Alb  2.4<L>  /  TBili  1.5<H>  /  DBili  1.0<H>  /  AST  9<L>  /  ALT  <5<L>  /  AlkPhos  207<H>        LIVER FUNCTIONS - ( 2020 01:36 )  Alb: 2.4 g/dL / Pro: 5.3 g/dL / ALK PHOS: 207 U/L / ALT: <5 U/L / AST: 9 U/L / GGT: x             PT/INR - ( 2020 01:32 )   PT: 15.9 sec;   INR: 1.38 ratio         PTT - ( 2020 01:32 )  PTT:61.7 sec                      Daily     Daily Weight in k (2020 05:25)    I&O's Summary    2020 07:01  -  2020 07:00  --------------------------------------------------------  IN: 1298 mL / OUT: 2330 mL / NET: -1032 mL    2020 07:01  -  2020 09:19  --------------------------------------------------------  IN: 22 mL / OUT: 0 mL / NET: 22 mL        Vital Signs Last 24 Hrs  T(C): 37.2 (2020 07:00), Max: 37.8 (2020 15:00)  T(F): 99 (2020 07:00), Max: 100 (2020 15:00)  HR: 78 (2020 07:00) (72 - 88)  BP: --  BP(mean): --  RR: 19 (2020 07:00) (11 - 34)  SpO2: 97% (2020 07:00) (92% - 99%)    PHYSICAL EXAM:   · Constitutional	Well-developed, well nourished  · Eyes	EOMI; PERRL; no drainage or redness  · ENMT	No oral lesions; no gross abnormalities  · Neck	No bruits; no thyromegaly or nodules  · Respiratory	Normal breath sounds b/l, No RRW  · Cardiovascular	Regular rate & rhythm, normal S1, S2; no murmurs, gallops or rubs; no S3, S4  · Gastrointestinal	Soft, non-tender, no hepatosplenomegaly, normal bowel sounds  · Extremities	No cyanosis, clubbing or edema  · Vascular	Equal and normal pulses (carotid, femoral, dorsalis pedis)  · Neurological	Alert & oriented; no sensory, motor or coordination deficits, normal reflexes

## 2020-01-18 NOTE — PROGRESS NOTE ADULT - ASSESSMENT
49M PMHx of ESRD on HD  ( via LUE AVF), HTN, Gout, Glaucoma, NET of pancreas, RA with hand contractures. s/p DDRT 12/8/19,  Post op course c/b DGF requiring HD. Renal bx x 2 c/w profound ATN.     Re-admitted with Influenza and perinephric hematoma. s/p hematoma evacuation/ abd washout and renal biopsy. Post op course c/b leukocytosis, infected hematoma, acute RLE DVT, and bleeding pseudoaneurysm at txp renal artery anastomosis. Now s/p graft nephrectomy. Course further complicated by saddle PE causing right heart strain and hemodynamic instability leading to cardiac arrest requiring ACLS, now with ROSC and intubated    [] DDRT c/b DGF/ATN/perinephric infected hematoma/pseudoneurysm/graft nephrectomy   - HD per nephrology team  - s/p bedside IR drainage of RLQ fluid collection (1/15), cxs with NGTD  - off immunosupression, decrease prednisone to 2.5mg QD and will plan to DC soon  - off  valcyte/bactrim  - Worsening leukocytosis: ID following;  cont meropenem, amakacin 1/17 x1 to repeat [id number] per ID, vanco x 1 dose (1/16), on fluc .    - pain control  - bowel regimen  - PT/OT/OOB  - RUE doppler in the setting of edema to R/O DVT  - Recommend to DC R radial A-Line    [] Saddle PE, with right heart strain and complicated by PEA arrest followed by Torsade, ROSC achieved after ACLS  - vascular, CT and vascular cardiology following   - continue anticoagulation and no plan for invasive intervention   - repeat Echo reviewed     [] New acute DVT of R external iliac, common femoral, femoral veins, R soleal vein requiring heparin drip  - IVC filter placed 1/9, on heparin drip     [] Dispo  - continue SICU care

## 2020-01-18 NOTE — PROGRESS NOTE ADULT - SUBJECTIVE AND OBJECTIVE BOX
patient is  OOB  sitting  in  a chair  Alert  oriented   Right  leg  swelling  present  Needs to  keep  elevated  On  heparin  BO  serous  fluid  Culture  negative  Concern is  WBC  50013  Suggest CT  abdomen  pelvis  for  residual  co;llection  sitting  on the arterial repair   Both  legs  are swollen  Needs to keep them  elevated  This  is  related to  iliac  vein thrombosis  and  IVC  occlusion  Prognosis  is  guarded Fear  of  arterial  blow out

## 2020-01-18 NOTE — PROGRESS NOTE ADULT - ATTENDING COMMENTS
Appears to be improving  afebrile  HD stable, on room air  sitting in chair  tolerating diet    o/e  bilateral lower limb edema  right arm edema   abdomen soft non-tender  drain o/p old blood - not turbid    Plan  bedside US ?residual collection  d/w Dr Velásquez - not for CT due to limited advantage/treatment change and staffing over weekend (probably tuesday)  wrap lower limbs gently with compressive bandage to decrease swelling  continue meropenem, amikacin, diflucan

## 2020-01-18 NOTE — PROGRESS NOTE ADULT - ASSESSMENT
49y Male with ESRD s/p DDRT on 12/8/19 with course complicated by delayed graft function requiring HD, and infected perinephric hematoma s/p washout on 1/1/2020 with worsening sepsis and transfer to ICU, s/p IR drainage of perinephric collection on 1/6. 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 He underwent operative repair of right external iliac artery with bovine patch, transplant nephrectomy, RLE thrombectomy, and IVC filter on 1/10. Post-op course complicated by cardiac arrest secondary to saddle PE.    PLAN:  Neuro:  acute post-op pain  - PRN Dilaudid    Resp: acute hypoxemic respiratory failure, saddle PE  - Continuous pulse oximetry  - f/u am CXR  - continue heparin gtt for PE     CV: obstructive shock secondary to PE, bradycardia -> PEA arrest -> torsades  - Continue PO amiodarone   - Troponins downtrending.   - Daily EKGs while on amiodarone.   - Follow up official echo.     GI: no acute issues  - Renal diet   - Protonix for stress ulcer prophylaxis    Renal: CKD stage V s/p DDRT c/b DGF & perinephric collection, s/p explantation of DDRT due to pseudoaneurysm of right external iliac and transplanted renal artery  - S/p percutaneous drainage of R iliac fossa fluid collection by IR 1/16 with 10 Fr catheter left in place.  - Monitor I&Os  - Monitor electrolytes and replete as necessary  - NS at 50 mL/hr while NPO and on vasopressor support  - Appreciate nephrology recommendations, hemodialysis as per nephrology    Heme: right external iliac & femoral DVT s/p thrombectomy & IVC filter, saddle PE  - Monitor CBC and coags  - Heparin infusion for saddle PE  - Appreciate PERT, vascular cardiology, and CT surgery recommendations    ID: infected perinephric hematoma  - Monitor WBC, temperature, and procalcitonin  - Bladder hematoma & RP hematoma on 1/10 with carbapenem resistant Enterobacter cloacae.  - IR U/S aspiration of RP collection 1/16 with NGTD.   - Empiric antibiotics with meropenem, addition of amikacin, fluconazole.     Endo: no acute issues  - Off methyprednisolone per Transplant.     Misc:  - Brimonidine/latanoprost drops    Disposition: SICU.

## 2020-01-18 NOTE — PROGRESS NOTE ADULT - ASSESSMENT
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 RVP with Influenza A s/p Tamiflu treatment course. Now with infected carlos-transplant hematoma.    1/1: s/p Ex-Lap and washout of infected hematoma (Enterobacter from cultures)  1/6: s/p IR guided drainage attempt of hematoma on 1/6 (NGTD from cultures  1/10: s/p transplant nephrectomy, repair of right external iliac artery with a bovine pericardial patch, repair of right external iliac vein and thrombectomy of right lower extremity veins.   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   I suspect Enterobacter is the primary pathogen    PEA arrest and Torsades on 1/13  CT C/A/P (1/13) with Saddle PE, interval increase in size of RP fluid collection (infected hematoma)    Suspect leukocytosis is reactive from residual hematoma, recent PEA Arrest and Saddle PE  I suspect Meropenem monotherapy is sufficient but reasonable to cover short term with Amikacin given rise in WBC  If further rise in WBC count would repeat CT C/A/P (can hold off for now)    Overall, Saddle pulmonary embolus, Leukocytosis (increasing), Infected Hematoma, Positive Urine Culture, Influenza Infection, Fever, Renal Transplant Recipient    --Recommend Amikacin - follow levels and dose accordingly   --Continue Meropenem 500 mg IV Q24H  --Would consider decreasing fluconazole to 200 mg PO Q24H (prophylaxis dose) - no evidence of yeast on cultures- check LFTS and Qtc  --Continue to follow CBC with diff  --Continue to follow temperature curve  --Follow up on preliminary surgical cultures   --Follow up on preliminary blood cultures   --Management of Saddle PE per 8ICU team and vascular teams    ID service will be covering over the weekend. Please call for acute issues or questions. [phone]

## 2020-01-18 NOTE — PROGRESS NOTE ADULT - SUBJECTIVE AND OBJECTIVE BOX
SURGICAL INTENSIVE CARE UNIT DAILY PROGRESS NOTE    24 HOUR EVENTS:   - Dosed 1x Amikacin, restarted Fluconazole 400mg per Transplant.   - No acute events overnight.     HPI:  49M PMHx of ESRD on HD  ( via LUE AVF), HTN, Gout, Glaucoma, NET of pancreas (s/p robotic distal panc/splenectomy at [place] 2015 by Dr. Young, followed by Dr. Raphael, [facility] Oncologist), RA with hand contractures. He underwent DDRT on 12/8/19 (ureteral stent sutured to Martinez - removed 12/15). Post op course c/b DGF requiring HD, thrombocytopenia, elevated LDH and Haptoglobulin. Schistocytes  on peripheral smear concerning for TMA. Envarsus held. Received Belatacept 12/13. Started on PLEX (12/12, 12/15). Underwent renal bx on 12/13 c/w profound ATN.  Found to have much improvement to levels, likely related to Tacrolimus. He was discharged home on 12/20/19 w/ outpatient HD.     He was now sent to ED by dialysis center after he was found to be febrile to 102, he did not get HD this AM, and last full HD session was on 12/26. Upon arrival, he was febrile to 101.7, other vitals were stable. He states that he had a cough that began 5-6 days ago that has now mostly resolved. He reports no other febrile episodes other than this morning. He admits to having one SOB episode earlier this week that resolved after he got HD. He also states that his mother was hospitalized earlier this week and was flu+. Currently makes less than 1 cup urine/daily. He currently denies SOB, CP, dyspnea, HA, dizziness, N/V, diarrhea, constipation.     Recipient info:   CPRA:    0%  ABO:      A+  CMVAb:  Positive  EBVIgG Status:  Positive  Last HD:  12/7/2019    Donor ID:  [id number]  Match: [id number]  OPO: NYRT  Age:  49  ABO:  A  KDPI 81%  COD:  Anoxia  X Clamp Time:  12/7/2019 1538.  Medical Hx: DM, HTN, HLD, obesity BMI 53, CAD, CABAGX3  Terminal Cr:  1/1.8/1.7  CMV- Neg EBV- Neg    OR:    DDRT to right iliac fossa. Simulect induction. Two arteries anastomosed to a single cuff on the back table. One vein, one ureter. Ureteral anastomosis performed over a double J stent, which was sutured to the martinez.   Cold ischemia time 25.5 hours. (27 Dec 2019 10:45)      NEURO: AOX3.     RESPIRATORY  RR: 13 (01-18-20 @ 02:00) (11 - 34)  SpO2: 95% (01-18-20 @ 02:00) (92% - 99%)  ABG - ( 18 Jan 2020 01:26 )  pH: 7.52  /  pCO2: 32    /  pO2: 108   / HCO3: 26    / Base Excess: 4.0   /  SaO2: 99        CARDIOVASCULAR  HR: 74 (01-18-20 @ 02:00) (72 - 81)  BP: 114/64 (01-17-20 @ 07:00) (114/64 - 114/64)  BP(mean): 83 (01-17-20 @ 07:00) (83 - 83)  ABP: 137/62 (01-18-20 @ 02:00) (106/51 - 164/69)  ABP(mean): 86 (01-18-20 @ 02:00) (70 - 102)    GI/NUTRITION  Soft, nontender, nondistended.     GENITOURINARY  I&O's Detail    01-16 @ 07:01  -  01-17 @ 07:00  --------------------------------------------------------  IN:    amiodarone Infusion: 367.4 mL    dextrose 5% + sodium chloride 0.9%: 1200 mL    heparin Infusion: 270 mL    Solution: 500 mL  Total IN: 2337.4 mL    OUT:    Bulb: 295 mL    VAC (Vacuum Assisted Closure) System: 200 mL  Total OUT: 495 mL    Total NET: 1842.4 mL      01-17 @ 07:01  -  01-18 @ 02:25  --------------------------------------------------------  IN:    dextrose 5% + sodium chloride 0.9%: 480 mL    dextrose 5% + sodium chloride 0.9%.: 20 mL    heparin Infusion: 216 mL    Solution: 350 mL  Total IN: 1066 mL    OUT:    Bulb: 190 mL    Other: 2000 mL  Total OUT: 2190 mL    Total NET: -1124 mL          01-18    136  |  95<L>  |  17  ----------------------------<  110<H>  3.8   |  23  |  4.31<H>    Ca    8.2<L>      18 Jan 2020 01:36  Phos  3.5     01-18  Mg     2.0     01-18    TPro  5.3<L>  /  Alb  2.4<L>  /  TBili  1.5<H>  /  DBili  1.0<H>  /  AST  9<L>  /  ALT  <5<L>  /  AlkPhos  207<H>  01-18      HEMATOLOGIC                        8.8    27.27 )-----------( 403      ( 18 Jan 2020 01:32 )             27.3     PT/INR - ( 18 Jan 2020 01:32 )   PT: 15.9 sec;   INR: 1.38 ratio         PTT - ( 18 Jan 2020 01:32 )  PTT:61.7 sec    INFECTIOUS DISEASES  RECENT CULTURES:  Specimen Source: .Blood Blood  Date/Time: 01-16 @ 14:51  Culture Results:   No growth to date.  Gram Stain: --  Organism: --  Specimen Source: .Body Fluid Abdominal Fluid  Date/Time: 01-15 @ 22:02  Culture Results:   No growth  Gram Stain:   polymorphonuclear leukocytes seen  No organisms seen  by cytocentrifuge  Organism: --

## 2020-01-18 NOTE — PROGRESS NOTE ADULT - ASSESSMENT
49 year old male with ESRD on HD, now s/p ddrt on 12/8/2019 course complicated by TMA/profound ATN, who presents from a HD center with a fever and cough. He was initially admitted with influenza. He is s/p washout of infected collection and biopsy which revealed ATN.  He was found to have diffusely enlarging perinephric fluid collection.    While on telemetry, he had an episode of a wide complex tachycardia. It initially appears irregular, suggesting an atrial arrhythmia with aberrancy, though the remainder appears more like an episode of non-sustained VT.   s/p washout on 1/1/2020 with worsening sepsis and transfer to ICU, s/p IR drainage of perinephric collection on 1/6. 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 He underwent operative repair of right external iliac artery with pericardial patch, transplant nephrectomy, RLE thrombectomy, and IVC filter on 1/9.     Now s/p corrina ->tachy arrest with tdp, af rvr, now on amio, with large PE    - critical events noted. now off of pressor support. Bp has improved overall  - developed malignant arrhythmias which seemed to start with bradycardia, leading to more tachycardia and bursts of VT and rapid AF.  The apparent af degenerated into torsades.  He was shocked and has been maintaining SR since  -  Currently on a low dose Amio load. Should be loaded to 10g and then decreased to 200mg Qday. If further arrhythmias present would increase Amio to 400mg q12.   - arrhythmias were most likely triggered by the massive vte event, are unlikely to represent a primary cardiac issue  - cannot exclude an ischemic contribution, though the likelihood of that is small overall.  - hs trop noted, though in the context of his arrhythmias, and right heart strain from the pe, the trop is more likely to reflect demand than an acs event  - ideally would be on asa, statin when able     - Now extubated. Monitor resp status closely.   - vasc medicine to evaluate but for now heparin gtt remains appropriate and the source of the thrombus is known  - echo results noted, with preserved/hyperdynamic lv, with RV failure consistent with acute RV overload  - Further cardiac workup will depend on clinical course.   - Patient at high risk for decompensation given the above comorbidities and active medical issues. Critically ill. I have personally spent >35 minutes  of critical care time in examining patient, reviewing chart, discussing care/management.

## 2020-01-18 NOTE — PROGRESS NOTE ADULT - ATTENDING COMMENTS
Patient seen and examined and agree with above.   No acute events overnight.    Current management includes:  1)Pain controlled with dilaudid PRN     2) Respiratory state is stable with SpO2 99% on room air.   CXR with increased pulmonary congestion - patient with HD today and removed 2 more liters  Saddle PE - continue heparin drip with goal 60-99  -patient states increased sputum and culture sent    3) Hemodynamically stable  -continue amiodarone at this time for recent episodes of torsades    4) Improving leukocytosis - continue antibiotics   -amikacin continued as well as fluconazole and meropenem     5) ESRD - received HD today  -will monitor electrolytes    Will continue to monitor closely.  Will plan for CT scan next week to assess size of pelvic hematoma.    RUE swelling and duplex ordered.

## 2020-01-18 NOTE — PROGRESS NOTE ADULT - ASSESSMENT
Adm w/Flu A 12/27/19  Hx ESRD on HD  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, DGF  S/p transplant kidney bx 12/13: ATN, focal TMA  S/p PLEX 12/13, 12/15; d/c-d 12/20 w/op HD 3 x wk  Re-adm 12/27, dx w/infected carlos-nephric hematoma   S/p OR 1/1 for washout and repeat renal graft biopsy  Repeat renal bx c/w ATN  Dx w/RLE DVT  Developed gross hematuria  S/p OR 1/10 for infected carlos-nephric hematoma, hemorrhage involving pseudo-aneurysm of anastomosis of renal artery to external iliac vein,   s/p transplant nephrectomy, s/p IVCF   S/p PEA arrest 1/13, intubated, s/p CTA, + PE, R retrop collection, on AC  Abx per ID, off pressors. S/p IR drainage of abd collection. S/p stable HD yesterday, 2 kg fluid removed  UF today. Fluid removal as able. 49 yr old man with ESRD s/p DDRT in Dec 2019 with complicated post operative course, now s/p graft nephrectomy on 1/10/20 back on hemodialysis off all immunosuppression.      ESRD - HD done yesterday. Plan for HD today , UF 2kg as tolerated.   DVT/massive PE - continue heparin drip.   Leukocytosis - 27 today, remains afebrile and hemodynamically stable. Continue meropenem, amikacin and diflucan. ID following

## 2020-01-18 NOTE — PROGRESS NOTE ADULT - SUBJECTIVE AND OBJECTIVE BOX
Patient is a 49y old  Male who presents with a chief complaint of Fever (18 Jan 2020 10:51)    Being followed by ID for        Interval history:  pt receiving hemodialysis via fistula  pt denies specific complaints  - Dosed 1x Amikacin, restarted Fluconazole 400mg per Transplant.   No other acute events        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  Carpal tunnel syndrome  Brain cyst: had MRI recently- now gone  Anemia  Gastric ulcer: Long time ago  Rheumatoid arthritis  Renal transplant recipient  Breakdown (mechanical) of penile (implanted) prosthesis, sequela  End-stage renal disease (ESRD): PERMACATH PLACEMENT  Abscess of left buttock: s/p I &amp; D  AV fistula: placement left lower arm  S/P cystoscopy: stent placement &amp; removal for kidney stones, lithiotripsy  s/p Lt Carpal tunnel: 2011    Allergies    coconut (Anaphylaxis)  morphine (Pruritus; Rash)    Intolerances      Antimicrobials:    fluconAZOLE IVPB 400 milliGRAM(s) IV Intermittent every 24 hours  meropenem  IVPB 500 milliGRAM(s) IV Intermittent every 24 hours    MEDICATIONS  (STANDING):  allopurinol 100 milliGRAM(s) Oral daily  aMIOdarone    Tablet 200 milliGRAM(s) Oral two times a day  brimonidine 0.2% Ophthalmic Solution 1 Drop(s) Both EYES three times a day  chlorhexidine 2% Cloths 1 Application(s) Topical <User Schedule>  dextrose 5% + sodium chloride 0.9%. 1000 milliLiter(s) (10 mL/Hr) IV Continuous <Continuous>  fluconAZOLE IVPB 400 milliGRAM(s) IV Intermittent every 24 hours  gabapentin 100 milliGRAM(s) Oral every 12 hours  heparin  Infusion 1000 Unit(s)/Hr (12 mL/Hr) IV Continuous <Continuous>  latanoprost 0.005% Ophthalmic Solution 1 Drop(s) Both EYES daily  meropenem  IVPB 500 milliGRAM(s) IV Intermittent every 24 hours  Nephro-candace 1 Tablet(s) Oral daily      Vital Signs Last 24 Hrs  T(C): 37.4 (01-18-20 @ 12:08), Max: 37.8 (01-17-20 @ 15:00)  T(F): 99.3 (01-18-20 @ 12:08), Max: 100 (01-17-20 @ 15:00)  HR: 64 (01-18-20 @ 12:08) (64 - 88)  BP: --  BP(mean): --  RR: 18 (01-18-20 @ 12:08) (13 - 34)  SpO2: 96% (01-18-20 @ 12:08) (92% - 99%)    Physical Exam:    Constitutional watching television receiveing HD    HEENT PERRLA EOMI,No pallor or icterus    No oral exudate or erythema    Neck supple no JVD or LN    Chest Good AE,CTA    CVS  S1 S2     Abd soft BS normal No tenderness     Ext r edema    IV site no erythema tenderness or discharge    Joints no swelling or LOM    CNS AAO X 3 no focal    Lab Data:                          8.8    27.27 )-----------( 403      ( 18 Jan 2020 01:32 )             27.3       01-18    136  |  95<L>  |  17  ----------------------------<  110<H>  3.8   |  23  |  4.31<H>    Ca    8.2<L>      18 Jan 2020 01:36  Phos  3.5     01-18  Mg     2.0     01-18    TPro  5.3<L>  /  Alb  2.4<L>  /  TBili  1.5<H>  /  DBili  1.0<H>  /  AST  9<L>  /  ALT  <5<L>  /  AlkPhos  207<H>  01-18          .Blood Blood  01-16-20   No growth to date.  --  --      .Body Fluid Abdominal Fluid  01-15-20   No growth  --    polymorphonuclear leukocytes seen  No organisms seen  by cytocentrifuge        Vancomycin Level, Trough: 16.0 ug/mL (01-17-20 @ 12:58)  Vancomycin Level, Trough: 20.8 ug/mL (01-17-20 @ 03:48)      WBC Count: 27.27 (01-18-20 @ 01:32)  WBC Count: 31.51 (01-17-20 @ 12:58)  WBC Count: 29.14 (01-17-20 @ 03:48)  WBC Count: 33.58 (01-16-20 @ 21:22)  WBC Count: 33.21 (01-16-20 @ 13:49)  WBC Count: 31.20 (01-16-20 @ 07:55)  WBC Count: 27.07 (01-16-20 @ 00:39)  WBC Count: 20.49 (01-15-20 @ 16:11)          Culture - Blood (01.16.20 @ 14:51)    Specimen Source: .Blood Blood-Peripheral    Culture Results:   No growth to date.    Culture - Blood (01.16.20 @ 14:51)    Specimen Source: .Blood Blood    Culture Results:   No growth to date.    Culture - Tissue with Gram Stain (01.10.20 @ 06:07)    -  Amikacin: S <=16    Gram Stain:   Few polymorphonuclear leukocytes seen per low power field  No organisms seen per oil power field    -  Imipenem: S <=1    -  Gentamicin: S <=2    -  Levofloxacin: S <=2    -  Ertapenem: R    -  Ciprofloxacin: S <=1    -  Piperacillin/Tazobactam: R >64    -  Meropenem: S <=1    -  Tobramycin: S <=2    -  Trimethoprim/Sulfamethoxazole: R >2/38    -  Ceftazidime/Avibactam: S <=4    -  Ceftolozane/tazobactam: R >8    -  Ceftriaxone: R >32 Enterobacter, Citrobacter, and Serratia may develop resistance during prolonged therapy    -  Cefoxitin: R >16    -  Cefepime: I 16    -  Cefazolin: R >16 Enterobacter, Citrobacter, and Serratia may develop resistance during prolonged therapy (3-4 days)    -  Aztreonam: R >16    -  Ampicillin: R >16 These ampicillin results predict results for amoxicillin    -  Amoxicillin/Clavulanic Acid: R >16/8    -  Ampicillin/Sulbactam: R >16/8 Enterobacter, Citrobacter, and Serratia may develop resistance during prolonged therapy (3-4 days)    Specimen Source: .Tissue 1. retroperitoneal hematoma    Culture Results:   Rare Enterobacter cloacae (Carbapenem Resistant) complex    Organism Identification: Enterobacter cloacae (Carbapenem Resistant)    Organism: Enterobacter cloacae (Carbapenem Resistant)    Method Type: MANDI

## 2020-01-18 NOTE — PROGRESS NOTE ADULT - SUBJECTIVE AND OBJECTIVE BOX
Seen in SICU, awake, alert.     MEDICATIONS  (STANDING):  allopurinol 100 milliGRAM(s) Oral daily  aMIOdarone    Tablet 200 milliGRAM(s) Oral two times a day  brimonidine 0.2% Ophthalmic Solution 1 Drop(s) Both EYES three times a day  chlorhexidine 2% Cloths 1 Application(s) Topical <User Schedule>  dextrose 5% + sodium chloride 0.9%. 1000 milliLiter(s) (10 mL/Hr) IV Continuous <Continuous>  fluconAZOLE IVPB 400 milliGRAM(s) IV Intermittent every 24 hours  gabapentin 100 milliGRAM(s) Oral every 12 hours  heparin  Infusion 1000 Unit(s)/Hr (12 mL/Hr) IV Continuous <Continuous>  latanoprost 0.005% Ophthalmic Solution 1 Drop(s) Both EYES daily  meropenem  IVPB 500 milliGRAM(s) IV Intermittent every 24 hours  Nephro-candace 1 Tablet(s) Oral daily    MEDICATIONS  (PRN):  acetaminophen   Tablet .. 975 milliGRAM(s) Oral every 6 hours PRN Mild Pain (1 - 3)  HYDROmorphone  Injectable 0.5 milliGRAM(s) IV Push every 3 hours PRN Severe Pain (7 - 10)  oxyCODONE    IR 5 milliGRAM(s) Oral every 4 hours PRN Moderate Pain (4 - 6)    T(C): 37.2 (01-18-20 @ 07:00), Max: 37.8 (01-17-20 @ 15:00)  HR: 80 (01-18-20 @ 09:00) (72 - 88)  BP: 114/64 (01-17-20 @ 07:00) (114/64 - 114/64)  RR: 20 (01-18-20 @ 09:00)  SpO2: 98% (01-18-20 @ 09:00)  Wt(kg): --  I&O's Detail    17 Jan 2020 07:01  -  18 Jan 2020 07:00  --------------------------------------------------------  IN:    dextrose 5% + sodium chloride 0.9%: 480 mL    dextrose 5% + sodium chloride 0.9%.: 80 mL    heparin Infusion: 288 mL    Solution: 450 mL  Total IN: 1298 mL    OUT:    Bulb: 330 mL    Other: 2000 mL  Total OUT: 2330 mL    Total NET: -1032 mL      18 Jan 2020 07:01  -  18 Jan 2020 09:42  --------------------------------------------------------  IN:    dextrose 5% + sodium chloride 0.9%.: 10 mL    heparin Infusion: 12 mL  Total IN: 22 mL    OUT:  Total OUT: 0 mL    Total NET: 22 mL      Card S1S2  Lungs decr BS b/l  Abd soft  Extr + edema b/l LE R > L, AVF patent                                                                                                                              LABORATORY:                        8.8    27.27 )-----------( 403      ( 18 Jan 2020 01:32 )             27.3     01-18    136  |  95<L>  |  17  ----------------------------<  110<H>  3.8   |  23  |  4.31<H>    Ca    8.2<L>      18 Jan 2020 01:36  Phos  3.5     01-18  Mg     2.0     01-18    TPro  5.3<L>  /  Alb  2.4<L>  /  TBili  1.5<H>  /  DBili  1.0<H>  /  AST  9<L>  /  ALT  <5<L>  /  AlkPhos  207<H>  01-18    Sodium, Serum: 136 mmol/L (01-18 @ 01:36)  Sodium, Serum: 135 mmol/L (01-17 @ 12:58)  Sodium, Serum: 135 mmol/L (01-17 @ 03:48)  Sodium, Serum: 135 mmol/L (01-16 @ 21:22)    Potassium, Serum: 3.8 mmol/L (01-18 @ 01:36)  Potassium, Serum: 3.9 mmol/L (01-17 @ 12:58)  Potassium, Serum: 3.7 mmol/L (01-17 @ 03:48)  Potassium, Serum: 3.9 mmol/L (01-16 @ 21:22)    Hemoglobin: 8.8 g/dL (01-18 @ 01:32)  Hemoglobin: 9.3 g/dL (01-17 @ 12:58)  Hemoglobin: 8.7 g/dL (01-17 @ 03:48)  Hemoglobin: 9.0 g/dL (01-16 @ 21:22)    Creatinine, Serum 4.31 (01-18 @ 01:36)  Creatinine, Serum 2.96 (01-17 @ 12:58)  Creatinine, Serum 5.15 (01-17 @ 03:48)  Creatinine, Serum 4.87 (01-16 @ 21:22)        LIVER FUNCTIONS - ( 18 Jan 2020 01:36 )  Alb: 2.4 g/dL / Pro: 5.3 g/dL / ALK PHOS: 207 U/L / ALT: <5 U/L / AST: 9 U/L / GGT: x             ABG - ( 18 Jan 2020 01:26 )  pH, Arterial: 7.52  pH, Blood: x     /  pCO2: 32    /  pO2: 108   / HCO3: 26    / Base Excess: 4.0   /  SaO2: [age] yr old man with ESRD s/p DDRT in Dec 2019 complicated by DGF due to ATN and early TMA thought to be due to prograf. Was on belatacept/MMF/Pred immunosuppression.   He was admitted with influenza, course complicated by perinephric hematoma complicated by enterobacter infection. s/p surgical exploration and wash out. He developed DVT started on heparin drip, subsequent gross hematuria. He was taken to the OR and found to have dehiscence of the ureter-bladder anastomosis as well as vascular anastomosis. He underwent graft nephrectomy on 1/10/20 with bovine biopatch repair of the EIA. On 1/13/20 he had cardiac arrest due to large pulmonary embolism s/p successful resuscitation.   S/p bedside drainage of RLQ collection on 1/15/20, 200cc removed and drain placed.       No major events overnight. BO draining ~ 300ml. Hemodynamically stable   Seen in SICU. Alert, sitting comfortably in chair .   Lungs b/l crackles present   Extremities - b/l LE edema 3+  RUE diffuse edema  LUE AVF + thrill   Abdomen - RLQ VAC dressing    MEDICATIONS  (STANDING):  allopurinol 100 milliGRAM(s) Oral daily  aMIOdarone    Tablet 200 milliGRAM(s) Oral two times a day  brimonidine 0.2% Ophthalmic Solution 1 Drop(s) Both EYES three times a day  chlorhexidine 2% Cloths 1 Application(s) Topical <User Schedule>  dextrose 5% + sodium chloride 0.9%. 1000 milliLiter(s) (10 mL/Hr) IV Continuous <Continuous>  fluconAZOLE IVPB 400 milliGRAM(s) IV Intermittent every 24 hours  gabapentin 100 milliGRAM(s) Oral every 12 hours  heparin  Infusion 1000 Unit(s)/Hr (12 mL/Hr) IV Continuous <Continuous>  latanoprost 0.005% Ophthalmic Solution 1 Drop(s) Both EYES daily  meropenem  IVPB 500 milliGRAM(s) IV Intermittent every 24 hours  Nephro-candace 1 Tablet(s) Oral daily    MEDICATIONS  (PRN):  acetaminophen   Tablet .. 975 milliGRAM(s) Oral every 6 hours PRN Mild Pain (1 - 3)  HYDROmorphone  Injectable 0.5 milliGRAM(s) IV Push every 3 hours PRN Severe Pain (7 - 10)  oxyCODONE    IR 5 milliGRAM(s) Oral every 4 hours PRN Moderate Pain (4 - 6)    T(C): 37.2 (01-18-20 @ 07:00), Max: 37.8 (01-17-20 @ 15:00)  HR: 80 (01-18-20 @ 09:00) (72 - 88)  BP: 114/64 (01-17-20 @ 07:00) (114/64 - 114/64)  RR: 20 (01-18-20 @ 09:00)  SpO2: 98% (01-18-20 @ 09:00)  Wt(kg): --  I&O's Detail    17 Jan 2020 07:01  -  18 Jan 2020 07:00  --------------------------------------------------------  IN:    dextrose 5% + sodium chloride 0.9%: 480 mL    dextrose 5% + sodium chloride 0.9%.: 80 mL    heparin Infusion: 288 mL    Solution: 450 mL  Total IN: 1298 mL    OUT:    Bulb: 330 mL    Other: 2000 mL  Total OUT: 2330 mL    Total NET: -1032 mL      18 Jan 2020 07:01  -  18 Jan 2020 09:42  --------------------------------------------------------  IN:    dextrose 5% + sodium chloride 0.9%.: 10 mL    heparin Infusion: 12 mL  Total IN: 22 mL    OUT:  Total OUT: 0 mL    Total NET: 22 mL      Card S1S2  Lungs decr BS b/l  Abd soft  Extr + edema b/l LE R > L, AVF patent                                                                                                                              LABORATORY:                        8.8    27.27 )-----------( 403      ( 18 Jan 2020 01:32 )             27.3     01-18    136  |  95<L>  |  17  ----------------------------<  110<H>  3.8   |  23  |  4.31<H>    Ca    8.2<L>      18 Jan 2020 01:36  Phos  3.5     01-18  Mg     2.0     01-18    TPro  5.3<L>  /  Alb  2.4<L>  /  TBili  1.5<H>  /  DBili  1.0<H>  /  AST  9<L>  /  ALT  <5<L>  /  AlkPhos  207<H>  01-18    Sodium, Serum: 136 mmol/L (01-18 @ 01:36)  Sodium, Serum: 135 mmol/L (01-17 @ 12:58)  Sodium, Serum: 135 mmol/L (01-17 @ 03:48)  Sodium, Serum: 135 mmol/L (01-16 @ 21:22)    Potassium, Serum: 3.8 mmol/L (01-18 @ 01:36)  Potassium, Serum: 3.9 mmol/L (01-17 @ 12:58)  Potassium, Serum: 3.7 mmol/L (01-17 @ 03:48)  Potassium, Serum: 3.9 mmol/L (01-16 @ 21:22)    Hemoglobin: 8.8 g/dL (01-18 @ 01:32)  Hemoglobin: 9.3 g/dL (01-17 @ 12:58)  Hemoglobin: 8.7 g/dL (01-17 @ 03:48)  Hemoglobin: 9.0 g/dL (01-16 @ 21:22)    Creatinine, Serum 4.31 (01-18 @ 01:36)  Creatinine, Serum 2.96 (01-17 @ 12:58)  Creatinine, Serum 5.15 (01-17 @ 03:48)  Creatinine, Serum 4.87 (01-16 @ 21:22)        LIVER FUNCTIONS - ( 18 Jan 2020 01:36 )  Alb: 2.4 g/dL / Pro: 5.3 g/dL / ALK PHOS: 207 U/L / ALT: <5 U/L / AST: 9 U/L / GGT: x             ABG - ( 18 Jan 2020 01:26 )  pH, Arterial: 7.52  pH, Blood: x     /  pCO2: 32    /  pO2: 108   / HCO3: 26    / Base Excess: 4.0   /  SaO2: 99

## 2020-01-18 NOTE — PROGRESS NOTE ADULT - ASSESSMENT
· Assessment		  49y Male with ESRD s/p DDRT on 12/8/19 with course complicated by delayed graft function requiring HD, and infected perinephric hematoma s/p washout on 1/1/2020 with worsening sepsis and transfer to ICU, s/p IR drainage of perinephric collection on 1/6. 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 He underwent operative repair of right external iliac artery with bovine patch, transplant nephrectomy, RLE thrombectomy, and IVC filter on 1/10. Post-op course complicated by cardiac arrest secondary to saddle PE.    Plan  - continue excellent sicu care   - leg elevation   - f/u abdominal US   - plastic consult for possible flap

## 2020-01-18 NOTE — PROGRESS NOTE ADULT - SUBJECTIVE AND OBJECTIVE BOX
Transplant Surgery - Multidisciplinary Rounds  --------------------------------------------------------------  R DDRT    Date:  12/8/19     Present:   Patient seen with multidisciplinary team including Transplant Surgeon: Dr. Espinal, Transplant Nephrologist: Dr. Irvin, PGY 3 Dr. Bess, NP/PA: Dariusz Aviles during am rounds and examined with Dr. Espinal. Disciplines not in attendance will be notified of the plan.     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 Young, followed by Dr. Raphael, [facility] Oncologist), RA with hand contractures. He underwent DDRT on 12/8/19. Post op course c/b DGF requiring HD, thrombocytopenia, elevated LDH and haptoglobin. Schistocytes  on peripheral smear concerning for TMA. Envarsus held. Received Belatacept 12/13. Started on PLEX (12/12, 12/15). Underwent renal bx on 12/13 c/w profound ATN.  Discharged home on 12/20/19 w/ outpatient HD.     Re-admitted 12/27 with influenza, completed treatement with Tamiflu on 12/31. Urine cx on admission grew Ent cloacae, was started on meropenem 12/31. Renal US on admission with increasing hematoma.   ·	OR 1/1 for ex-lap, hematoma evacuation, washout and renal bx. (c/w ATN)  OR cxs grew CRE and e coli. Post op course c/b:   ·	fevers and leukocytosis, CT a/p (1/4) with increased perinephric collection. Underwent IR guided collection 1/6   ·	New onset hematuria, martinez inserted 1/7, started CBI  ·	RLE edema, RLE doppler (1/8) with acute above the knee thrombus of R external iliac, common femoral and femoral veins, Acute calf vein thrombus affecting R soleal vein, Heparin gtt initiated   ·	AMS 1/9  (head CT neg), hyponatremic  ·	Significant hematuria and bleeding around the martinez. Angio 1/9 showed actively bleeding pseudoaneurysm at the anastomosis of the transplant renal artery with the recipient iliac artery. A stent was placed in the iliac artery to  control hemorrhage. IVC filter placed.   ·	Emergent OR 1/9-1/10 for graft nephrectomy;  ureter to bladder anastomosis was completely disrupted, repaired the R external iliac artery with bovine pericardial patch, repaired right external iliac vein, performed thrombectomy of RLE veins, Intra op received 3u PRBC.   ·	Extubated 1/11  ·	1/13 Saddle PE, with right heart strain and complicated by PEA arrest followed by Torsade, ROSC achieved after ACLS; re-intubated  ·	1/15 Renal US with 13.7 x 5.0 x 1.5 cm in RLQ transplant bed; s/p bedside IR guided drainage, pigtail in place. Fluid cx to date with NG    Interval events:    - extubated 1/16/20; no overnight events  - Off amio gtt, transitioned to PO  - leukocytosis (wbc down 27 from 31): repeat bld cxs pending; received vanco 1g (1/16), on meropenem; restarted on fluc. received one dose of amakacin, Fluid cx from 1/15 (IR drainage of RLQ collection) with no growth to date  - Plan for CT chest/abd/pelvis if WBC trend upward  - HD today    cxs:   12/29: Urine Cx: Enterobacter Cloacae  1/1 OR Perinephric collection Cx:  Carbapenem resistant Ent Cloacae, E. Coli  1/1: R kidney abscess swab: Enterobacter Cloacae  1/1  OR tissue: Enterobacter Cloacae  1/5: Urine Cx: Enteropbacter Cloacae  1/5: Skin incision Cx (bedside): NG  1/6: Adominal fluid Cx from IR:  NG   1/7: Urine Cx:  NG  1/7: BCX2:  NG  1/10 retroperit hematoma - enerobacter   1/10 bladder hematoma - enterobacter  1/15 Drainage fluid - NGTD    Potential Discharge date: pending clinical improvement    MEDICATIONS  (STANDING):     MEDICATIONS  (STANDING):  allopurinol 100 milliGRAM(s) Oral daily  aMIOdarone    Tablet 200 milliGRAM(s) Oral two times a day  brimonidine 0.2% Ophthalmic Solution 1 Drop(s) Both EYES three times a day  chlorhexidine 2% Cloths 1 Application(s) Topical <User Schedule>  dextrose 5% + sodium chloride 0.9%. 1000 milliLiter(s) (10 mL/Hr) IV Continuous <Continuous>  fluconAZOLE IVPB 400 milliGRAM(s) IV Intermittent every 24 hours  gabapentin 100 milliGRAM(s) Oral every 12 hours  heparin  Infusion 1000 Unit(s)/Hr (12 mL/Hr) IV Continuous <Continuous>  latanoprost 0.005% Ophthalmic Solution 1 Drop(s) Both EYES daily  meropenem  IVPB 500 milliGRAM(s) IV Intermittent every 24 hours  Nephro-candace 1 Tablet(s) Oral daily    MEDICATIONS  (PRN):  acetaminophen   Tablet .. 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  Carpal tunnel syndrome  Brain cyst: had MRI recently- now gone  Anemia  Gastric ulcer: Long time ago  Rheumatoid arthritis  Renal transplant recipient  Breakdown (mechanical) of penile (implanted) prosthesis, sequela  End-stage renal disease (ESRD): PERMACATH PLACEMENT  Abscess of left buttock: s/p I &amp; D  AV fistula: placement left lower arm  S/P cystoscopy: stent placement &amp; removal for kidney stones, lithiotripsy  s/p Lt Carpal tunnel: 2011      Vital Signs Last 24 Hrs  T(C): 37.2 (18 Jan 2020 07:00), Max: 37.8 (17 Jan 2020 15:00)  T(F): 99 (18 Jan 2020 07:00), Max: 100 (17 Jan 2020 15:00)  HR: 80 (18 Jan 2020 10:00) (72 - 88)  BP: --  BP(mean): --  RR: 18 (18 Jan 2020 10:00) (11 - 34)  SpO2: 95% (18 Jan 2020 10:00) (92% - 99%)    I&O's Summary    17 Jan 2020 07:01  -  18 Jan 2020 07:00  --------------------------------------------------------  IN: 1298 mL / OUT: 2330 mL / NET: -1032 mL    18 Jan 2020 07:01  -  18 Jan 2020 11:07  --------------------------------------------------------  IN: 88 mL / OUT: 0 mL / NET: 88 mL                              8.8    27.27 )-----------( 403      ( 18 Jan 2020 01:32 )             27.3     01-18    136  |  95<L>  |  17  ----------------------------<  110<H>  3.8   |  23  |  4.31<H>    Ca    8.2<L>      18 Jan 2020 01:36  Phos  3.5     01-18  Mg     2.0     01-18    TPro  5.3<L>  /  Alb  2.4<L>  /  TBili  1.5<H>  /  DBili  1.0<H>  /  AST  9<L>  /  ALT  <5<L>  /  AlkPhos  207<H>  01-18          Culture - Blood (collected 01-16-20 @ 14:51)  Source: .Blood Blood-Peripheral  Preliminary Report (01-17-20 @ 15:01):    No growth to date.    Culture - Blood (collected 01-16-20 @ 14:51)  Source: .Blood Blood  Preliminary Report (01-17-20 @ 15:01):    No growth to date.    Culture - Fungal, Body Fluid (collected 01-15-20 @ 22:02)  Source: .Body Fluid Abdominal Fluid  Preliminary Report (01-16-20 @ 08:50):    Testing in progress    Culture - Body Fluid with Gram Stain (collected 01-15-20 @ 22:02)  Source: .Body Fluid Abdominal Fluid  Gram Stain (01-16-20 @ 04:26):    polymorphonuclear leukocytes seen    No organisms seen    by cytocentrifuge  Preliminary Report (01-16-20 @ 18:45):    No growth              REVIEW OF SYSTEMS  Gen: + weakness   Skin: No rashes  Head/Eyes/Ears/Mouth: No headache; Normal hearing; Normal vision w/o blurriness; No sinus pain/discomfort, sore throat  Respiratory: + cough, dry  CV: No chest pain, PND, orthopnea  GI: incisional tenderness   : + martinez  MSK: + lower extrem edema  Neuro: No dizziness/lightheadedness, weakness, seizures, numbness, tingling  Heme: No easy bruising or bleeding  Endo: No heat/cold intolerance  Psych: No significant nervousness, anxiety, stress, depression    PHYSICAL EXAM:    Constitutional: extubated, NAD  	Eyes: Anicteric, PERRLA  	Respiratory: + crackles  	Cardiovascular: RRR  	Gastrointestinal: Soft abdomen, appropriate incisional TTP, ND.  wound vac ss/murky output, RLQ drain with SS drainage    Genitourinary:  Martinez, anuric, residual hematuria in bag    Extremities: b/l lower extrem edema, R> L, RUE +3 edema  	Vascular: palpable DP pulses. L AVF palpable  	Neurological: A&O x3.  	Skin: no new lesions/ulcerations  	Musculoskeletal: Moving all extremities

## 2020-01-18 NOTE — PROGRESS NOTE ADULT - PROBLEM SELECTOR PLAN 3
Had Enterobacter in urine and RLQ fluid collection initially - subsequent cultures negative but has persistent leukocytosis.   Continue Meropenem, Amikacin and diflucan.

## 2020-01-19 LAB
ALBUMIN SERPL ELPH-MCNC: 2.1 G/DL — LOW (ref 3.3–5)
ALP SERPL-CCNC: 203 U/L — HIGH (ref 40–120)
ALT FLD-CCNC: <5 U/L — LOW (ref 10–45)
AMIKACIN TROUGH SERPL-MCNC: 9.6 UG/ML — CRITICAL HIGH
ANION GAP SERPL CALC-SCNC: 13 MMOL/L — SIGNIFICANT CHANGE UP (ref 5–17)
APTT BLD: 56.6 SEC — HIGH (ref 27.5–36.3)
APTT BLD: 75.3 SEC — HIGH (ref 27.5–36.3)
APTT BLD: 75.4 SEC — HIGH (ref 27.5–36.3)
AST SERPL-CCNC: 9 U/L — LOW (ref 10–40)
BILIRUB DIRECT SERPL-MCNC: 1 MG/DL — HIGH (ref 0–0.2)
BILIRUB INDIRECT FLD-MCNC: 0.4 MG/DL — SIGNIFICANT CHANGE UP (ref 0.2–1)
BILIRUB SERPL-MCNC: 1.4 MG/DL — HIGH (ref 0.2–1.2)
BUN SERPL-MCNC: 22 MG/DL — SIGNIFICANT CHANGE UP (ref 7–23)
CALCIUM SERPL-MCNC: 8.2 MG/DL — LOW (ref 8.4–10.5)
CHLORIDE SERPL-SCNC: 97 MMOL/L — SIGNIFICANT CHANGE UP (ref 96–108)
CO2 SERPL-SCNC: 24 MMOL/L — SIGNIFICANT CHANGE UP (ref 22–31)
CREAT SERPL-MCNC: 5.28 MG/DL — HIGH (ref 0.5–1.3)
GLUCOSE SERPL-MCNC: 107 MG/DL — HIGH (ref 70–99)
HCT VFR BLD CALC: 25.2 % — LOW (ref 39–50)
HGB BLD-MCNC: 8.1 G/DL — LOW (ref 13–17)
INR BLD: 1.31 RATIO — HIGH (ref 0.88–1.16)
MAGNESIUM SERPL-MCNC: 2 MG/DL — SIGNIFICANT CHANGE UP (ref 1.6–2.6)
MCHC RBC-ENTMCNC: 29.3 PG — SIGNIFICANT CHANGE UP (ref 27–34)
MCHC RBC-ENTMCNC: 32.1 GM/DL — SIGNIFICANT CHANGE UP (ref 32–36)
MCV RBC AUTO: 91.3 FL — SIGNIFICANT CHANGE UP (ref 80–100)
NRBC # BLD: 0 /100 WBCS — SIGNIFICANT CHANGE UP (ref 0–0)
PHOSPHATE SERPL-MCNC: 3.8 MG/DL — SIGNIFICANT CHANGE UP (ref 2.5–4.5)
PLATELET # BLD AUTO: 397 K/UL — SIGNIFICANT CHANGE UP (ref 150–400)
POTASSIUM SERPL-MCNC: 3.8 MMOL/L — SIGNIFICANT CHANGE UP (ref 3.5–5.3)
POTASSIUM SERPL-SCNC: 3.8 MMOL/L — SIGNIFICANT CHANGE UP (ref 3.5–5.3)
PROT SERPL-MCNC: 5.2 G/DL — LOW (ref 6–8.3)
PROTHROM AB SERPL-ACNC: 15.2 SEC — HIGH (ref 10–12.9)
RBC # BLD: 2.76 M/UL — LOW (ref 4.2–5.8)
RBC # FLD: 17.8 % — HIGH (ref 10.3–14.5)
SODIUM SERPL-SCNC: 134 MMOL/L — LOW (ref 135–145)
WBC # BLD: 16.77 K/UL — HIGH (ref 3.8–10.5)
WBC # FLD AUTO: 16.77 K/UL — HIGH (ref 3.8–10.5)

## 2020-01-19 PROCEDURE — 93971 EXTREMITY STUDY: CPT | Mod: 26

## 2020-01-19 PROCEDURE — 71045 X-RAY EXAM CHEST 1 VIEW: CPT | Mod: 26

## 2020-01-19 PROCEDURE — 99232 SBSQ HOSP IP/OBS MODERATE 35: CPT

## 2020-01-19 PROCEDURE — 99291 CRITICAL CARE FIRST HOUR: CPT

## 2020-01-19 PROCEDURE — 76705 ECHO EXAM OF ABDOMEN: CPT | Mod: 26

## 2020-01-19 PROCEDURE — 99233 SBSQ HOSP IP/OBS HIGH 50: CPT

## 2020-01-19 RX ORDER — LIDOCAINE HCL 20 MG/ML
1 VIAL (ML) INJECTION
Refills: 0 | Status: DISCONTINUED | OUTPATIENT
Start: 2020-01-19 | End: 2020-01-24

## 2020-01-19 RX ORDER — LIDOCAINE HCL 20 MG/ML
10 VIAL (ML) INJECTION EVERY 4 HOURS
Refills: 0 | Status: DISCONTINUED | OUTPATIENT
Start: 2020-01-19 | End: 2020-01-19

## 2020-01-19 RX ORDER — METOPROLOL TARTRATE 50 MG
25 TABLET ORAL EVERY 12 HOURS
Refills: 0 | Status: DISCONTINUED | OUTPATIENT
Start: 2020-01-19 | End: 2020-01-20

## 2020-01-19 RX ADMIN — CHLORHEXIDINE GLUCONATE 1 APPLICATION(S): 213 SOLUTION TOPICAL at 05:56

## 2020-01-19 RX ADMIN — HYDROMORPHONE HYDROCHLORIDE 0.5 MILLIGRAM(S): 2 INJECTION INTRAMUSCULAR; INTRAVENOUS; SUBCUTANEOUS at 11:05

## 2020-01-19 RX ADMIN — HYDROMORPHONE HYDROCHLORIDE 0.5 MILLIGRAM(S): 2 INJECTION INTRAMUSCULAR; INTRAVENOUS; SUBCUTANEOUS at 21:13

## 2020-01-19 RX ADMIN — Medication 100 MILLIGRAM(S): at 11:08

## 2020-01-19 RX ADMIN — Medication 25 MILLIGRAM(S): at 17:59

## 2020-01-19 RX ADMIN — SODIUM CHLORIDE 10 MILLILITER(S): 9 INJECTION, SOLUTION INTRAVENOUS at 19:32

## 2020-01-19 RX ADMIN — HYDROMORPHONE HYDROCHLORIDE 0.5 MILLIGRAM(S): 2 INJECTION INTRAMUSCULAR; INTRAVENOUS; SUBCUTANEOUS at 21:30

## 2020-01-19 RX ADMIN — HYDROMORPHONE HYDROCHLORIDE 0.5 MILLIGRAM(S): 2 INJECTION INTRAMUSCULAR; INTRAVENOUS; SUBCUTANEOUS at 17:33

## 2020-01-19 RX ADMIN — HEPARIN SODIUM 14 UNIT(S)/HR: 5000 INJECTION INTRAVENOUS; SUBCUTANEOUS at 19:32

## 2020-01-19 RX ADMIN — FLUCONAZOLE 100 MILLIGRAM(S): 150 TABLET ORAL at 11:12

## 2020-01-19 RX ADMIN — AMIODARONE HYDROCHLORIDE 200 MILLIGRAM(S): 400 TABLET ORAL at 17:18

## 2020-01-19 RX ADMIN — BRIMONIDINE TARTRATE 1 DROP(S): 2 SOLUTION/ DROPS OPHTHALMIC at 05:56

## 2020-01-19 RX ADMIN — Medication 1 TABLET(S): at 11:07

## 2020-01-19 RX ADMIN — GABAPENTIN 100 MILLIGRAM(S): 400 CAPSULE ORAL at 05:56

## 2020-01-19 RX ADMIN — GABAPENTIN 100 MILLIGRAM(S): 400 CAPSULE ORAL at 17:18

## 2020-01-19 RX ADMIN — LATANOPROST 1 DROP(S): 0.05 SOLUTION/ DROPS OPHTHALMIC; TOPICAL at 11:07

## 2020-01-19 RX ADMIN — HYDROMORPHONE HYDROCHLORIDE 0.5 MILLIGRAM(S): 2 INJECTION INTRAMUSCULAR; INTRAVENOUS; SUBCUTANEOUS at 17:18

## 2020-01-19 RX ADMIN — AMIODARONE HYDROCHLORIDE 200 MILLIGRAM(S): 400 TABLET ORAL at 05:55

## 2020-01-19 RX ADMIN — HYDROMORPHONE HYDROCHLORIDE 0.5 MILLIGRAM(S): 2 INJECTION INTRAMUSCULAR; INTRAVENOUS; SUBCUTANEOUS at 11:20

## 2020-01-19 RX ADMIN — BRIMONIDINE TARTRATE 1 DROP(S): 2 SOLUTION/ DROPS OPHTHALMIC at 21:13

## 2020-01-19 RX ADMIN — BRIMONIDINE TARTRATE 1 DROP(S): 2 SOLUTION/ DROPS OPHTHALMIC at 13:09

## 2020-01-19 RX ADMIN — MEROPENEM 100 MILLIGRAM(S): 1 INJECTION INTRAVENOUS at 21:12

## 2020-01-19 NOTE — PROGRESS NOTE ADULT - ATTENDING COMMENTS
Patient seen and examined and agree with above.   No acute events overnight.  Sputum with rare gpc and vancomycin started.     Current management includes:  1)Pain controlled with dilaudid PRN     2) Respiratory state is stable with SpO2 -% on room air.   CXR with mild pulmonary congestion - plan for HD tomorrow   Saddle PE - continue heparin drip with goal 60-99    3) Hemodynamically stable  -continue amiodarone at this time for recent episodes of torsades    4) Improving leukocytosis - continue antibiotics   -amikacin continued as well as fluconazole and meropenem and now vancomycin added  -amikacin trough     5) ESRD - received HD yesterday with 2 liters removed   -will monitor electrolytes    Will continue to monitor closely.  Will plan for CT scan next week to assess size of pelvic hematoma.  RUE and RLE duplex wanted as per team.

## 2020-01-19 NOTE — PROGRESS NOTE ADULT - ASSESSMENT
49M PMHx of ESRD on HD  ( via LUE AVF), HTN, Gout, Glaucoma, NET of pancreas, RA with hand contractures. s/p DDRT 12/8/19,  Post op course c/b DGF requiring HD. Renal bx x 2 c/w profound ATN.     Re-admitted with Influenza and perinephric hematoma. s/p hematoma evacuation/ abd washout and renal biopsy. Post op course c/b leukocytosis, infected hematoma, acute RLE DVT, and bleeding pseudoaneurysm at txp renal artery anastomosis. Now s/p graft nephrectomy. Course further complicated by saddle PE causing right heart strain and hemodynamic instability leading to cardiac arrest requiring ACLS, now with ROSC and intubated    [] DDRT c/b DGF/ATN/perinephric infected hematoma/pseudoneurysm/graft nephrectomy   - HD per nephrology team  - s/p bedside IR drainage of RLQ fluid collection (1/15), cxs with NGTD  - off immunosupression  - off  valcyte/bactrim  - leukocytosis improving today down to 16: ID following;  cont meropenem, amakacin 1/17 x1 to repeat [id number] per ID next dose due today, vanco 500mg Q M/W/F, on fluc .    - pain control  - bowel regimen  - PT/OT/OOB  - RUE doppler in the setting of edema to R/O DVT  - Keep B/L LE bandaged with ace wrap  - Recommend to DC R radial A-Line    [] Saddle PE, with right heart strain and complicated by PEA arrest followed by Torsade, ROSC achieved after ACLS  - vascular, CT and vascular cardiology following   - continue anticoagulation and no plan for invasive intervention   - repeat Echo reviewed     [] New acute DVT of R external iliac, common femoral, femoral veins, R soleal vein requiring heparin drip  - IVC filter placed 1/9, on heparin drip     [] Dispo  - continue SICU care

## 2020-01-19 NOTE — PROGRESS NOTE ADULT - PROBLEM SELECTOR PLAN 1
S/p HD yesterday. Volume overload improving. Electrolytes fair.   HD tomorrow UF 2liters as tolerated.

## 2020-01-19 NOTE — PROGRESS NOTE ADULT - SUBJECTIVE AND OBJECTIVE BOX
Transplant Surgery - Multidisciplinary Rounds  --------------------------------------------------------------  R DDRT    Date:  12/8/19     Present:   Patient seen with multidisciplinary team including Transplant Surgeon: Dr. Espinal, Transplant Nephrologist: Dr. Irvin, NP: Stefan during am rounds and examined with Dr. Espinal. Disciplines not in attendance will be notified of the plan.     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 Young, followed by Dr. Raphael, [facility] Oncologist), RA with hand contractures. He underwent DDRT on 12/8/19. Post op course c/b DGF requiring HD, thrombocytopenia, elevated LDH and haptoglobin. Schistocytes  on peripheral smear concerning for TMA. Envarsus held. Received Belatacept 12/13. Started on PLEX (12/12, 12/15). Underwent renal bx on 12/13 c/w profound ATN.  Discharged home on 12/20/19 w/ outpatient HD.     Re-admitted 12/27 with influenza, completed treatement with Tamiflu on 12/31. Urine cx on admission grew Ent cloacae, was started on meropenem 12/31. Renal US on admission with increasing hematoma.   ·	OR 1/1 for ex-lap, hematoma evacuation, washout and renal bx. (c/w ATN)  OR cxs grew CRE and e coli. Post op course c/b:   ·	fevers and leukocytosis, CT a/p (1/4) with increased perinephric collection. Underwent IR guided collection 1/6   ·	New onset hematuria, martinez inserted 1/7, started CBI  ·	RLE edema, RLE doppler (1/8) with acute above the knee thrombus of R external iliac, common femoral and femoral veins, Acute calf vein thrombus affecting R soleal vein, Heparin gtt initiated   ·	AMS 1/9  (head CT neg), hyponatremic  ·	Significant hematuria and bleeding around the martinez. Angio 1/9 showed actively bleeding pseudoaneurysm at the anastomosis of the transplant renal artery with the recipient iliac artery. A stent was placed in the iliac artery to  control hemorrhage. IVC filter placed.   ·	Emergent OR 1/9-1/10 for graft nephrectomy;  ureter to bladder anastomosis was completely disrupted, repaired the R external iliac artery with bovine pericardial patch, repaired right external iliac vein, performed thrombectomy of RLE veins, Intra op received 3u PRBC.   ·	Extubated 1/11  ·	1/13 Saddle PE, with right heart strain and complicated by PEA arrest followed by Torsade, ROSC achieved after ACLS; re-intubated  ·	1/15 Renal US with 13.7 x 5.0 x 1.5 cm in RLQ transplant bed; s/p bedside IR guided drainage, pigtail in place. Fluid cx to date with NG    Interval events:    - extubated 1/16/20; no overnight events  - Off amio gtt, transitioned to PO  - leukocytosis (wbc down to 16 from 27 from 31): repeat bld cxs pending; received vanco 500mg last night for sputum prelim positive with gram + coci, on meropenem; fluc. received one dose of amakacin, Fluid cx from 1/17-1/15 (IR drainage of RLQ collection) with no growth to date  - S/P HD yesterday  - B/L LE edema wrapped with ace bandage    cxs:   12/29: Urine Cx: Enterobacter Cloacae  1/1 OR Perinephric collection Cx:  Carbapenem resistant Ent Cloacae, E. Coli  1/1: R kidney abscess swab: Enterobacter Cloacae  1/1  OR tissue: Enterobacter Cloacae  1/5: Urine Cx: Enteropbacter Cloacae  1/5: Skin incision Cx (bedside): NG  1/6: Adominal fluid Cx from IR:  NG   1/7: Urine Cx:  NG  1/7: BCX2:  NG  1/10 retroperit hematoma - enerobacter   1/10 bladder hematoma - enterobacter  1/15 Drainage fluid - NGTD    Potential Discharge date: pending clinical improvement       MEDICATIONS  (STANDING):  allopurinol 100 milliGRAM(s) Oral daily  aMIOdarone    Tablet 200 milliGRAM(s) Oral two times a day  brimonidine 0.2% Ophthalmic Solution 1 Drop(s) Both EYES three times a day  chlorhexidine 2% Cloths 1 Application(s) Topical <User Schedule>  dextrose 5% + sodium chloride 0.9%. 1000 milliLiter(s) (10 mL/Hr) IV Continuous <Continuous>  fluconAZOLE IVPB 400 milliGRAM(s) IV Intermittent every 24 hours  gabapentin 100 milliGRAM(s) Oral every 12 hours  heparin  Infusion 1000 Unit(s)/Hr (14 mL/Hr) IV Continuous <Continuous>  latanoprost 0.005% Ophthalmic Solution 1 Drop(s) Both EYES daily  meropenem  IVPB 500 milliGRAM(s) IV Intermittent every 24 hours  Nephro-candace 1 Tablet(s) Oral daily  vancomycin  IVPB 500 milliGRAM(s) IV Intermittent <User Schedule>    MEDICATIONS  (PRN):  acetaminophen   Tablet .. 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  Carpal tunnel syndrome  Brain cyst: had MRI recently- now gone  Anemia  Gastric ulcer: Long time ago  Rheumatoid arthritis  Renal transplant recipient  Breakdown (mechanical) of penile (implanted) prosthesis, sequela  End-stage renal disease (ESRD): PERMACATH PLACEMENT  Abscess of left buttock: s/p I &amp; D  AV fistula: placement left lower arm  S/P cystoscopy: stent placement &amp; removal for kidney stones, lithiotripsy  s/p Lt Carpal tunnel: 2011      Vital Signs Last 24 Hrs  T(C): 37.6 (19 Jan 2020 07:00), Max: 37.6 (19 Jan 2020 07:00)  T(F): 99.7 (19 Jan 2020 07:00), Max: 99.7 (19 Jan 2020 07:00)  HR: 80 (19 Jan 2020 09:00) (64 - 80)  BP: --  BP(mean): --  RR: 12 (19 Jan 2020 09:00) (12 - 29)  SpO2: 100% (19 Jan 2020 09:00) (95% - 100%)    I&O's Summary    18 Jan 2020 07:01  -  19 Jan 2020 07:00  --------------------------------------------------------  IN: 1630 mL / OUT: 2850 mL / NET: -1220 mL    19 Jan 2020 07:01  -  19 Jan 2020 10:20  --------------------------------------------------------  IN: 24 mL / OUT: 0 mL / NET: 24 mL                              8.1    16.77 )-----------( 397      ( 19 Jan 2020 01:39 )             25.2     01-19    134<L>  |  97  |  22  ----------------------------<  107<H>  3.8   |  24  |  5.28<H>    Ca    8.2<L>      19 Jan 2020 01:39  Phos  3.8     01-19  Mg     2.0     01-19    TPro  5.2<L>  /  Alb  2.1<L>  /  TBili  1.4<H>  /  DBili  1.0<H>  /  AST  9<L>  /  ALT  <5<L>  /  AlkPhos  203<H>  01-19          Culture - Sputum (collected 01-18-20 @ 15:26)  Source: .Sputum Sputum, trap  Gram Stain (01-18-20 @ 20:56):    No polymorphonuclear leukocytes per low power field    Rare Squamous epithelial cells per low power field    Rare Gram positive cocci in pairs per oil power field    Culture - Blood (collected 01-16-20 @ 14:51)  Source: .Blood Blood-Peripheral  Preliminary Report (01-17-20 @ 15:01):    No growth to date.    Culture - Blood (collected 01-16-20 @ 14:51)  Source: .Blood Blood  Preliminary Report (01-17-20 @ 15:01):    No growth to date.    Culture - Fungal, Body Fluid (collected 01-15-20 @ 22:02)  Source: .Body Fluid Abdominal Fluid  Preliminary Report (01-16-20 @ 08:50):    Testing in progress    Culture - Body Fluid with Gram Stain (collected 01-15-20 @ 22:02)  Source: .Body Fluid Abdominal Fluid  Gram Stain (01-16-20 @ 04:26):    polymorphonuclear leukocytes seen    No organisms seen    by cytocentrifuge  Preliminary Report (01-16-20 @ 18:45):    No growth    REVIEW OF SYSTEMS  Gen: + weakness   Skin: No rashes  Head/Eyes/Ears/Mouth: No headache; Normal hearing; Normal vision w/o blurriness; No sinus pain/discomfort, sore throat  Respiratory: + cough, dry  CV: No chest pain, PND, orthopnea  GI: incisional tenderness   : + martinez  MSK: + lower extrem edema  Neuro: No dizziness/lightheadedness, weakness, seizures, numbness, tingling  Heme: No easy bruising or bleeding  Endo: No heat/cold intolerance  Psych: No significant nervousness, anxiety, stress, depression    PHYSICAL EXAM:    Constitutional: extubated, NAD  	Eyes: Anicteric, PERRLA  	Respiratory: + crackles  	Cardiovascular: RRR  	Gastrointestinal: Soft abdomen, appropriate incisional TTP, ND.  wound vac ss/murky output, RLQ drain with SS drainage    Genitourinary:  Martinez, anuric, residual hematuria in bag    Extremities: b/l lower ext edema, R> L, RUE +3 edema intact with ace wrapped  	Vascular: palpable DP pulses. L AVF palpable  	Neurological: A&O x3.  	Skin: no new lesions/ulcerations  	Musculoskeletal: Moving all extremities

## 2020-01-19 NOTE — PROGRESS NOTE ADULT - SUBJECTIVE AND OBJECTIVE BOX
· Subjective and Objective:   [facility] -- Melissa Kelsey, Enrique, Maximino, Hamzah Funes Savella, Goodger  Office # [phone]      Follow Up:  resp failure    Subjective/Observations: Patient seen and examined. Events noted. Resting comfortably in chair. Extubated.  No complaints of chest pain, or palpitations reported. No signs of orthopnea or PND.   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  Carpal tunnel syndrome  Brain cyst: had MRI recently- now gone  Anemia  Gastric ulcer: Long time ago  Rheumatoid arthritis  Renal transplant recipient  Breakdown (mechanical) of penile (implanted) prosthesis, sequela  End-stage renal disease (ESRD): PERMACATH PLACEMENT  Abscess of left buttock: s/p I &amp; D  AV fistula: placement left lower arm  S/P cystoscopy: stent placement &amp; removal for kidney stones, lithiotripsy  s/p Lt Carpal tunnel:       MEDICATIONS  (STANDING):  allopurinol 100 milliGRAM(s) Oral daily  aMIOdarone    Tablet 200 milliGRAM(s) Oral two times a day  brimonidine 0.2% Ophthalmic Solution 1 Drop(s) Both EYES three times a day  chlorhexidine 2% Cloths 1 Application(s) Topical <User Schedule>  dextrose 5% + sodium chloride 0.9%. 1000 milliLiter(s) (10 mL/Hr) IV Continuous <Continuous>  fluconAZOLE IVPB 400 milliGRAM(s) IV Intermittent every 24 hours  gabapentin 100 milliGRAM(s) Oral every 12 hours  heparin  Infusion 1000 Unit(s)/Hr (14 mL/Hr) IV Continuous <Continuous>  latanoprost 0.005% Ophthalmic Solution 1 Drop(s) Both EYES daily  meropenem  IVPB 500 milliGRAM(s) IV Intermittent every 24 hours  Nephro-candace 1 Tablet(s) Oral daily  vancomycin  IVPB 500 milliGRAM(s) IV Intermittent <User Schedule>      Allergies    coconut (Anaphylaxis)  morphine (Pruritus; Rash)    Intolerances                              8.1    16.77 )-----------( 397      ( 2020 01:39 )             25.2       01-19    134<L>  |  97  |  22  ----------------------------<  107<H>  3.8   |  24  |  5.28<H>    Ca    8.2<L>      2020 01:39  Phos  3.8       Mg     2.0         TPro  5.2<L>  /  Alb  2.1<L>  /  TBili  1.4<H>  /  DBili  1.0<H>  /  AST  9<L>  /  ALT  <5<L>  /  AlkPhos  203<H>        LIVER FUNCTIONS - ( 2020 01:39 )  Alb: 2.1 g/dL / Pro: 5.2 g/dL / ALK PHOS: 203 U/L / ALT: <5 U/L / AST: 9 U/L / GGT: x             PT/INR - ( 2020 01:39 )   PT: 15.2 sec;   INR: 1.31 ratio         PTT - ( :39 )  PTT:56.6 sec                      Daily     Daily Weight in k.1 (2020 14:08)    I&O's Summary    :  -  2020 07:00  --------------------------------------------------------  IN: 1630 mL / OUT: 2850 mL / NET: -1220 mL    2020 07:01  -  2020 10:00  --------------------------------------------------------  IN: 24 mL / OUT: 0 mL / NET: 24 mL        Vital Signs Last 24 Hrs  T(C): 37.6 (2020 07:00), Max: 37.6 (2020 07:00)  T(F): 99.7 (2020 07:00), Max: 99.7 (2020 07:00)  HR: 80 (2020 09:00) (64 - 80)  BP: --  BP(mean): --  RR: 12 (2020 09:00) (12 - 29)  SpO2: 100% (2020 09:00) (95% - 100%)    PHYSICAL EXAM:   · Constitutional	Well-developed, well nourished  · Eyes	EOMI; PERRL; no drainage or redness  · ENMT	No oral lesions; no gross abnormalities  · Neck	No bruits; no thyromegaly or nodules  · Respiratory	Normal breath sounds b/l, No RRW  · Cardiovascular	Regular rate & rhythm, normal S1, S2; no murmurs, gallops or rubs; no S3, S4  · Gastrointestinal	Soft, non-tender, no hepatosplenomegaly, normal bowel sounds  · Extremities	No cyanosis, clubbing or edema  · Vascular	Equal and normal pulses (carotid, femoral, dorsalis pedis)  · Neurological	Alert & oriented; no sensory, motor or coordination deficits, normal reflexes

## 2020-01-19 NOTE — PROGRESS NOTE ADULT - ASSESSMENT
49y Male with ESRD s/p DDRT on 12/8/19 with course complicated by delayed graft function requiring HD, and infected perinephric hematoma s/p washout on 1/1/2020 with worsening sepsis and transfer to ICU, s/p IR drainage of perinephric collection on 1/6. 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 He underwent operative repair of right external iliac artery with bovine patch, transplant nephrectomy, RLE thrombectomy, and IVC filter on 1/10. Post-op course complicated by cardiac arrest secondary to saddle PE.    PLAN:  Neuro:  Acute post-op pain  - PRN Tylenol, Oxycodone and Dilaudid    Resp: acute hypoxemic respiratory failure, saddle PE  - Continuous pulse oximetry  - Continue heparin gtt for PE     CV: obstructive shock secondary to PE, bradycardia -> PEA arrest -> torsades  - Continue PO amiodarone   - Troponin downtrending.   - Daily EKGs while on amiodarone.   - Follow up official echo.     GI: No acute issues  - Renal diet   - Protonix for stress ulcer prophylaxis    Renal: CKD stage V s/p DDRT c/b DGF & perinephric collection, s/p explantation of DDRT due to pseudoaneurysm of right external iliac and transplanted renal artery  - S/p percutaneous drainage of R iliac fossa fluid collection by IR 1/16 with 10 Fr catheter left in place.  - Monitor I&Os  - Monitor electrolytes and replete as necessary  - NS at 50 mL/hr while NPO and on vasopressor support  - Appreciate Nephrology recommendations, hemodialysis as per Nephrology    Heme: right external iliac & femoral DVT s/p thrombectomy & IVC filter, saddle PE  - Monitor CBC and coags  - Heparin infusion for saddle PE  - Appreciate PERT, vascular cardiology, and CT surgery recommendations    ID: infected perinephric hematoma  - Monitor WBC, temperature, and procalcitonin  - Bladder hematoma & RP hematoma on 1/10 with carbapenem resistant Enterobacter cloacae.  - IR U/S aspiration of RP collection 1/16 with NGTD.   - Empiric antibiotics with meropenem, addition of amikacin, fluconazole.  - Amikacin trough pending this AM     Endo: no acute issues  - Off methyprednisolone per Transplant.     Misc:  - Brimonidine/latanoprost drops    Disposition: SICU.

## 2020-01-19 NOTE — PROGRESS NOTE ADULT - SUBJECTIVE AND OBJECTIVE BOX
[facility] DIVISION OF KIDNEY DISEASES AND HYPERTENSION -- FOLLOW UP NOTE  --------------------------------------------------------------------------------    49 yr old man with ESRD s/p DDRT in Dec 2019 complicated by DGF due to ATN and early TMA thought to be due to prograf. Was on belatacept/MMF/Pred immunosuppression.   He was admitted with influenza, course complicated by perinephric hematoma complicated by enterobacter infection. s/p surgical exploration and wash out. He developed DVT started on heparin drip, subsequent gross hematuria. He was taken to the OR and found to have dehiscence of the ureter-bladder anastomosis as well as vascular anastomosis. He underwent graft nephrectomy on 1/10/20 with bovine biopatch repair of the EIA. On 1/13/20 he had cardiac arrest due to large pulmonary embolism s/p successful resuscitation.   S/p bedside drainage of RLQ collection on 1/15/20, 200cc removed and drain placed.     No major events overnight. Tolerated HD yesterday. Hemodynamically stable.   Sitting comfortably in chair. Appears clinically improved, more alert, less dyspneic.   Lungs bi basilar crackles improving   Abdomen - BO drain 250ml /24 hours, tenderness in RLQ, vac dressing present   Extremities - edema improved with b/l ACE wrapping   RUE edema improving      Standing Inpatient Medications  allopurinol 100 milliGRAM(s) Oral daily  aMIOdarone    Tablet 200 milliGRAM(s) Oral two times a day  brimonidine 0.2% Ophthalmic Solution 1 Drop(s) Both EYES three times a day  chlorhexidine 2% Cloths 1 Application(s) Topical <User Schedule>  dextrose 5% + sodium chloride 0.9%. 1000 milliLiter(s) IV Continuous <Continuous>  fluconAZOLE IVPB 400 milliGRAM(s) IV Intermittent every 24 hours  gabapentin 100 milliGRAM(s) Oral every 12 hours  heparin  Infusion 1000 Unit(s)/Hr IV Continuous <Continuous>  latanoprost 0.005% Ophthalmic Solution 1 Drop(s) Both EYES daily  meropenem  IVPB 500 milliGRAM(s) IV Intermittent every 24 hours  Nephro-candace 1 Tablet(s) Oral daily  vancomycin  IVPB 500 milliGRAM(s) IV Intermittent <User Schedule>    PRN Inpatient Medications  acetaminophen   Tablet .. 975 milliGRAM(s) Oral every 6 hours PRN  HYDROmorphone  Injectable 0.5 milliGRAM(s) IV Push every 3 hours PRN  oxyCODONE    IR 5 milliGRAM(s) Oral every 4 hours PRN        VITALS  --------------------------------------------------------------------------------  T(C): 37.6 (01-19-20 @ 07:00), Max: 37.6 (01-19-20 @ 07:00)  HR: 80 (01-19-20 @ 09:00) (64 - 80)  /84  RR: 12 (01-19-20 @ 09:00) (12 - 29)  SpO2: 100% (01-19-20 @ 09:00) (95% - 100%)      01-18-20 @ 07:01  -  01-19-20 @ 07:00  --------------------------------------------------------  IN: 1630 mL / OUT: 2850 mL / NET: -1220 mL    01-19-20 @ 07:01  -  01-19-20 @ 10:45  --------------------------------------------------------  IN: 24 mL / OUT: 0 mL / NET: 24 mL        LABS/STUDIES  --------------------------------------------------------------------------------              8.1    16.77 >-----------<  397      [01-19-20 @ 01:39]              25.2     134  |  97  |  22  ----------------------------<  107      [01-19-20 @ 01:39]  3.8   |  24  |  5.28        Ca     8.2     [01-19-20 @ 01:39]      Mg     2.0     [01-19-20 @ 01:39]      Phos  3.8     [01-19-20 @ 01:39]    TPro  5.2  /  Alb  2.1  /  TBili  1.4  /  DBili  1.0  /  AST  9   /  ALT  <5  /  AlkPhos  203  [01-19-20 @ 01:39]    PT/INR: PT 15.2 , INR 1.31       [01-19-20 @ 01:39]  PTT: 75.3       [01-19-20 @ 10:00]

## 2020-01-19 NOTE — PROGRESS NOTE ADULT - SUBJECTIVE AND OBJECTIVE BOX
SURGICAL INTENSIVE CARE UNIT DAILY PROGRESS NOTE    24 HOUR EVENTS:   -- S/p dialysis, well-tolerated, 2.0 L removed.  -- Bilateral LE compression wrapping for LE edema.   -- Sputum culture with rare GPC      HPI:  49M PMHx of ESRD on HD  ( via LUE AVF), HTN, Gout, Glaucoma, NET of pancreas (s/p robotic distal panc/splenectomy at [place] 2015 by Dr. Young, followed by Dr. Raphael, [facility] Oncologist), RA with hand contractures. He underwent DDRT on 12/8/19 (ureteral stent sutured to Martinez - removed 12/15). Post op course c/b DGF requiring HD, thrombocytopenia, elevated LDH and Haptoglobulin. Schistocytes  on peripheral smear concerning for TMA. Envarsus held. Received Belatacept 12/13. Started on PLEX (12/12, 12/15). Underwent renal bx on 12/13 c/w profound ATN.  Found to have much improvement to levels, likely related to Tacrolimus. He was discharged home on 12/20/19 w/ outpatient HD.   He was now sent to ED by dialysis center after he was found to be febrile to 102, he did not get HD this AM, and last full HD session was on 12/26. Upon arrival, he was febrile to 101.7, other vitals were stable. He states that he had a cough that began 5-6 days ago that has now mostly resolved. He reports no other febrile episodes other than this morning. He admits to having one SOB episode earlier this week that resolved after he got HD. He also states that his mother was hospitalized earlier this week and was flu+. Currently makes less than 1 cup urine/daily. He currently denies SOB, CP, dyspnea, HA, dizziness, N/V, diarrhea, constipation.     Recipient info:   CPRA:    0%  ABO:      A+  CMVAb:  Positive  EBVIgG Status:  Positive  Last HD:  12/7/2019    Donor ID:  [id number]  Match: [id number]  OPO: NYRT  Age:  49  ABO:  A  KDPI 81%  COD:  Anoxia  X Clamp Time:  12/7/2019 1538.  Medical Hx: DM, HTN, HLD, obesity BMI 53, CAD, CABAGX3  Terminal Cr:  1/1.8/1.7  CMV- Neg EBV- Neg    OR:    DDRT to right iliac fossa. Simulect induction. Two arteries anastomosed to a single cuff on the back table. One vein, one ureter. Ureteral anastomosis performed over a double J stent, which was sutured to the martinez.   Cold ischemia time 25.5 hours. (27 Dec 2019 10:45)      NEURO  A&O x4    RESPIRATORY  RR: 16 (01-19-20 @ 01:00) (16 - 34)  SpO2: 96% (01-19-20 @ 01:00) (92% - 100%)  Wt(kg): --  Mechanical Ventilation:   ABG - ( 18 Jan 2020 01:26 )  pH: 7.52  /  pCO2: 32    /  pO2: 108   / HCO3: 26    / Base Excess: 4.0   /  SaO2: 99      Lactate: x        CARDIOVASCULAR  HR: 70 (01-19-20 @ 01:00) (64 - 88)  BP: --  BP(mean): --  ABP: 108/52 (01-19-20 @ 01:00) (101/47 - 169/72)  ABP(mean): 72 (01-19-20 @ 01:00) (66 - 99)  Wt(kg): --  CVP(cm H2O): --    GI/NUTRITION  Diet: Renal diet    GENITOURINARY  I&O's Detail    01-17 @ 07:01  -  01-18 @ 07:00  --------------------------------------------------------  IN:    dextrose 5% + sodium chloride 0.9%: 480 mL    dextrose 5% + sodium chloride 0.9%.: 80 mL    heparin Infusion: 288 mL    Solution: 450 mL  Total IN: 1298 mL    OUT:    Bulb: 330 mL    Other: 2000 mL  Total OUT: 2330 mL    Total NET: -1032 mL      01-18 @ 07:01  -  01-19 @ 02:03  --------------------------------------------------------  IN:    dextrose 5% + sodium chloride 0.9%.: 180 mL    heparin Infusion: 216 mL    Oral Fluid: 150 mL    Other: 600 mL    Solution: 350 mL  Total IN: 1496 mL    OUT:    Bulb: 160 mL    Other: 2600 mL  Total OUT: 2760 mL  Total NET: -1264 mL    01-18    136  |  95<L>  |  17  ----------------------------<  110<H>  3.8   |  23  |  4.31<H>    Ca    8.2<L>      18 Jan 2020 01:36  Phos  3.5     01-18  Mg     2.0     01-18    TPro  5.3<L>  /  Alb  2.4<L>  /  TBili  1.5<H>  /  DBili  1.0<H>  /  AST  9<L>  /  ALT  <5<L>  /  AlkPhos  207<H>  01-18      HEMATOLOGIC                        8.8    27.27 )-----------( 403      ( 18 Jan 2020 01:32 )             27.3     PT/INR - ( 18 Jan 2020 01:32 )   PT: 15.9 sec;   INR: 1.38 ratio         PTT - ( 18 Jan 2020 01:32 )  PTT:61.7 sec    INFECTIOUS DISEASES  RECENT CULTURES:  Specimen Source: .Sputum Sputum, trap  Date/Time: 01-18 @ 15:26  Culture Results: --  Gram Stain:   No polymorphonuclear leukocytes per low power field  Rare Squamous epithelial cells per low power field  Rare Gram positive cocci in pairs per oil power field  Organism: --  Specimen Source: .Blood Blood  Date/Time: 01-16 @ 14:51  Culture Results:   No growth to date.  Gram Stain: --  Organism: --  Specimen Source: .Body Fluid Abdominal Fluid  Date/Time: 01-15 @ 22:02  Culture Results:   No growth  Gram Stain:   polymorphonuclear leukocytes seen  No organisms seen  by cytocentrifuge  Organism: --

## 2020-01-19 NOTE — PROGRESS NOTE ADULT - ASSESSMENT
Assessment  49y Male with ESRD s/p DDRT on 12/8/19 with course complicated by delayed graft function requiring HD, and infected perinephric hematoma s/p washout on 1/1/2020 with worsening sepsis and transfer to ICU, s/p IR drainage of perinephric collection on 1/6. 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 He underwent operative repair of right external iliac artery with bovine patch, transplant nephrectomy, RLE thrombectomy, and IVC filter on 1/10. Post-op course complicated by cardiac arrest secondary to saddle PE.    Plan  - continue excellent sicu care   - leg elevation   - f/u abdominal US

## 2020-01-19 NOTE — PROGRESS NOTE ADULT - ASSESSMENT
49 yr old man with ESRD s/p DDRT in Dec 2019 with complicated post operative course, now s/p graft nephrectomy on 1/10/20 back on hemodialysis off all immunosuppression.  Also has massive PE/DVT on heparin drip.

## 2020-01-19 NOTE — PROGRESS NOTE ADULT - SUBJECTIVE AND OBJECTIVE BOX
VASCULAR SURGERY DAILY PROGRESS NOTE:    Subjective:  Patient seen and examined at bedside with surgical team, patient without complaints. No acute events overnight.    Vital Signs Last 24 Hrs  T(C): 37.7 (2020 11:00), Max: 37.7 (2020 11:00)  T(F): 99.9 (2020 11:00), Max: 99.9 (2020 11:00)  HR: 79 (2020 12:00) (69 - 80)  BP: --  BP(mean): --  RR: 18 (2020 12:00) (12 - 25)  SpO2: 100% (2020 12:00) (95% - 100%)    Physical Exam  Constitutional: NAD  Respiratory: breathing comfortably on RA  Abd: soft, NT, ND  Ext: weeping b/l LE edema, +DP palpable b/l    I&O's Detail    2020 07:01  -  2020 07:00  --------------------------------------------------------  IN:    dextrose 5% + sodium chloride 0.9%.: 240 mL    heparin Infusion: 290 mL    Oral Fluid: 150 mL    Other: 600 mL    Solution: 350 mL  Total IN: 1630 mL    OUT:    Bulb: 250 mL    Other: 2600 mL  Total OUT: 2850 mL    Total NET: -1220 mL      2020 07:01  -  2020 12:55  --------------------------------------------------------  IN:    dextrose 5% + sodium chloride 0.9%.: 50 mL    heparin Infusion: 70 mL    Solution: 200 mL  Total IN: 320 mL    OUT:    Bulb: 60 mL  Total OUT: 60 mL    Total NET: 260 mL          Daily     Daily Weight in k.1 (2020 14:08)    MEDICATIONS  (STANDING):  allopurinol 100 milliGRAM(s) Oral daily  aMIOdarone    Tablet 200 milliGRAM(s) Oral two times a day  brimonidine 0.2% Ophthalmic Solution 1 Drop(s) Both EYES three times a day  chlorhexidine 2% Cloths 1 Application(s) Topical <User Schedule>  dextrose 5% + sodium chloride 0.9%. 1000 milliLiter(s) (10 mL/Hr) IV Continuous <Continuous>  fluconAZOLE IVPB 400 milliGRAM(s) IV Intermittent every 24 hours  gabapentin 100 milliGRAM(s) Oral every 12 hours  heparin  Infusion 1000 Unit(s)/Hr (14 mL/Hr) IV Continuous <Continuous>  latanoprost 0.005% Ophthalmic Solution 1 Drop(s) Both EYES daily  meropenem  IVPB 500 milliGRAM(s) IV Intermittent every 24 hours  Nephro-candace 1 Tablet(s) Oral daily  vancomycin  IVPB 500 milliGRAM(s) IV Intermittent <User Schedule>    MEDICATIONS  (PRN):  acetaminophen   Tablet .. 975 milliGRAM(s) Oral every 6 hours PRN Mild Pain (1 - 3)  HYDROmorphone  Injectable 0.5 milliGRAM(s) IV Push every 3 hours PRN Severe Pain (7 - 10)  oxyCODONE    IR 5 milliGRAM(s) Oral every 4 hours PRN Moderate Pain (4 - 6)      LABS:                        8.1    16.77 )-----------( 397      ( 2020 01:39 )             25.2         134<L>  |  97  |  22  ----------------------------<  107<H>  3.8   |  24  |  5.28<H>    Ca    8.2<L>      2020 01:39  Phos  3.8       Mg     2.0         TPro  5.2<L>  /  Alb  2.1<L>  /  TBili  1.4<H>  /  DBili  1.0<H>  /  AST  9<L>  /  ALT  <5<L>  /  AlkPhos  203<H>      PT/INR - ( 2020 01:39 )   PT: 15.2 sec;   INR: 1.31 ratio         PTT - ( 2020 10:00 )  PTT:75.3 sec

## 2020-01-19 NOTE — PROGRESS NOTE ADULT - PROBLEM SELECTOR PLAN 3
Leukocytosis improving down to 16 today. Afebrile. Sputum growing gram +ve cocci in pairs. Vanco resumed.   Continue Meropenem, Amikacin and diflucan.

## 2020-01-20 DIAGNOSIS — I82.621 ACUTE EMBOLISM AND THROMBOSIS OF DEEP VEINS OF RIGHT UPPER EXTREMITY: ICD-10-CM

## 2020-01-20 LAB
ALBUMIN SERPL ELPH-MCNC: 2.2 G/DL — LOW (ref 3.3–5)
ALP SERPL-CCNC: 234 U/L — HIGH (ref 40–120)
ALT FLD-CCNC: <5 U/L — LOW (ref 10–45)
ANION GAP SERPL CALC-SCNC: 12 MMOL/L — SIGNIFICANT CHANGE UP (ref 5–17)
APTT BLD: 76.8 SEC — HIGH (ref 27.5–36.3)
AST SERPL-CCNC: 11 U/L — SIGNIFICANT CHANGE UP (ref 10–40)
BILIRUB DIRECT SERPL-MCNC: 0.8 MG/DL — HIGH (ref 0–0.2)
BILIRUB INDIRECT FLD-MCNC: 0.5 MG/DL — SIGNIFICANT CHANGE UP (ref 0.2–1)
BILIRUB SERPL-MCNC: 1.3 MG/DL — HIGH (ref 0.2–1.2)
BUN SERPL-MCNC: 27 MG/DL — HIGH (ref 7–23)
CALCIUM SERPL-MCNC: 8.3 MG/DL — LOW (ref 8.4–10.5)
CHLORIDE SERPL-SCNC: 95 MMOL/L — LOW (ref 96–108)
CO2 SERPL-SCNC: 23 MMOL/L — SIGNIFICANT CHANGE UP (ref 22–31)
CREAT SERPL-MCNC: 6.37 MG/DL — HIGH (ref 0.5–1.3)
CULTURE RESULTS: SIGNIFICANT CHANGE UP
CULTURE RESULTS: SIGNIFICANT CHANGE UP
GLUCOSE SERPL-MCNC: 110 MG/DL — HIGH (ref 70–99)
HCT VFR BLD CALC: 25.6 % — LOW (ref 39–50)
HGB BLD-MCNC: 8.2 G/DL — LOW (ref 13–17)
INR BLD: 1.33 RATIO — HIGH (ref 0.88–1.16)
MAGNESIUM SERPL-MCNC: 2 MG/DL — SIGNIFICANT CHANGE UP (ref 1.6–2.6)
MCHC RBC-ENTMCNC: 29.4 PG — SIGNIFICANT CHANGE UP (ref 27–34)
MCHC RBC-ENTMCNC: 32 GM/DL — SIGNIFICANT CHANGE UP (ref 32–36)
MCV RBC AUTO: 91.8 FL — SIGNIFICANT CHANGE UP (ref 80–100)
NRBC # BLD: 0 /100 WBCS — SIGNIFICANT CHANGE UP (ref 0–0)
PHOSPHATE SERPL-MCNC: 4 MG/DL — SIGNIFICANT CHANGE UP (ref 2.5–4.5)
PLATELET # BLD AUTO: 439 K/UL — HIGH (ref 150–400)
POTASSIUM SERPL-MCNC: 3.8 MMOL/L — SIGNIFICANT CHANGE UP (ref 3.5–5.3)
POTASSIUM SERPL-SCNC: 3.8 MMOL/L — SIGNIFICANT CHANGE UP (ref 3.5–5.3)
PROT SERPL-MCNC: 5.5 G/DL — LOW (ref 6–8.3)
PROTHROM AB SERPL-ACNC: 15.3 SEC — HIGH (ref 10–12.9)
RBC # BLD: 2.79 M/UL — LOW (ref 4.2–5.8)
RBC # FLD: 17.3 % — HIGH (ref 10.3–14.5)
SODIUM SERPL-SCNC: 130 MMOL/L — LOW (ref 135–145)
SPECIMEN SOURCE: SIGNIFICANT CHANGE UP
SPECIMEN SOURCE: SIGNIFICANT CHANGE UP
WBC # BLD: 16.42 K/UL — HIGH (ref 3.8–10.5)
WBC # FLD AUTO: 16.42 K/UL — HIGH (ref 3.8–10.5)

## 2020-01-20 PROCEDURE — 99232 SBSQ HOSP IP/OBS MODERATE 35: CPT

## 2020-01-20 PROCEDURE — 99291 CRITICAL CARE FIRST HOUR: CPT

## 2020-01-20 PROCEDURE — 99233 SBSQ HOSP IP/OBS HIGH 50: CPT | Mod: GC

## 2020-01-20 PROCEDURE — 71045 X-RAY EXAM CHEST 1 VIEW: CPT | Mod: 26

## 2020-01-20 RX ORDER — HYDROMORPHONE HYDROCHLORIDE 2 MG/ML
0.5 INJECTION INTRAMUSCULAR; INTRAVENOUS; SUBCUTANEOUS ONCE
Refills: 0 | Status: DISCONTINUED | OUTPATIENT
Start: 2020-01-20 | End: 2020-01-20

## 2020-01-20 RX ORDER — FLUCONAZOLE 150 MG/1
200 TABLET ORAL EVERY 24 HOURS
Refills: 0 | Status: DISCONTINUED | OUTPATIENT
Start: 2020-01-20 | End: 2020-01-20

## 2020-01-20 RX ORDER — MICAFUNGIN SODIUM 100 MG/1
100 INJECTION, POWDER, LYOPHILIZED, FOR SOLUTION INTRAVENOUS EVERY 24 HOURS
Refills: 0 | Status: DISCONTINUED | OUTPATIENT
Start: 2020-01-20 | End: 2020-01-22

## 2020-01-20 RX ADMIN — HYDROMORPHONE HYDROCHLORIDE 0.5 MILLIGRAM(S): 2 INJECTION INTRAMUSCULAR; INTRAVENOUS; SUBCUTANEOUS at 20:00

## 2020-01-20 RX ADMIN — MICAFUNGIN SODIUM 105 MILLIGRAM(S): 100 INJECTION, POWDER, LYOPHILIZED, FOR SOLUTION INTRAVENOUS at 16:26

## 2020-01-20 RX ADMIN — Medication 25 MILLIGRAM(S): at 06:12

## 2020-01-20 RX ADMIN — AMIODARONE HYDROCHLORIDE 200 MILLIGRAM(S): 400 TABLET ORAL at 17:33

## 2020-01-20 RX ADMIN — HEPARIN SODIUM 14 UNIT(S)/HR: 5000 INJECTION INTRAVENOUS; SUBCUTANEOUS at 13:05

## 2020-01-20 RX ADMIN — AMIODARONE HYDROCHLORIDE 200 MILLIGRAM(S): 400 TABLET ORAL at 06:13

## 2020-01-20 RX ADMIN — FLUCONAZOLE 50 MILLIGRAM(S): 150 TABLET ORAL at 11:59

## 2020-01-20 RX ADMIN — HYDROMORPHONE HYDROCHLORIDE 0.5 MILLIGRAM(S): 2 INJECTION INTRAMUSCULAR; INTRAVENOUS; SUBCUTANEOUS at 06:26

## 2020-01-20 RX ADMIN — SODIUM CHLORIDE 10 MILLILITER(S): 9 INJECTION, SOLUTION INTRAVENOUS at 07:00

## 2020-01-20 RX ADMIN — BRIMONIDINE TARTRATE 1 DROP(S): 2 SOLUTION/ DROPS OPHTHALMIC at 06:13

## 2020-01-20 RX ADMIN — HYDROMORPHONE HYDROCHLORIDE 0.5 MILLIGRAM(S): 2 INJECTION INTRAMUSCULAR; INTRAVENOUS; SUBCUTANEOUS at 17:08

## 2020-01-20 RX ADMIN — GABAPENTIN 100 MILLIGRAM(S): 400 CAPSULE ORAL at 17:33

## 2020-01-20 RX ADMIN — HYDROMORPHONE HYDROCHLORIDE 0.5 MILLIGRAM(S): 2 INJECTION INTRAMUSCULAR; INTRAVENOUS; SUBCUTANEOUS at 22:36

## 2020-01-20 RX ADMIN — CHLORHEXIDINE GLUCONATE 1 APPLICATION(S): 213 SOLUTION TOPICAL at 06:13

## 2020-01-20 RX ADMIN — HYDROMORPHONE HYDROCHLORIDE 0.5 MILLIGRAM(S): 2 INJECTION INTRAMUSCULAR; INTRAVENOUS; SUBCUTANEOUS at 22:51

## 2020-01-20 RX ADMIN — HYDROMORPHONE HYDROCHLORIDE 0.5 MILLIGRAM(S): 2 INJECTION INTRAMUSCULAR; INTRAVENOUS; SUBCUTANEOUS at 06:11

## 2020-01-20 RX ADMIN — Medication 1 MILLILITER(S): at 20:01

## 2020-01-20 RX ADMIN — MEROPENEM 100 MILLIGRAM(S): 1 INJECTION INTRAVENOUS at 20:42

## 2020-01-20 RX ADMIN — GABAPENTIN 100 MILLIGRAM(S): 400 CAPSULE ORAL at 06:12

## 2020-01-20 RX ADMIN — BRIMONIDINE TARTRATE 1 DROP(S): 2 SOLUTION/ DROPS OPHTHALMIC at 13:05

## 2020-01-20 RX ADMIN — HYDROMORPHONE HYDROCHLORIDE 0.5 MILLIGRAM(S): 2 INJECTION INTRAMUSCULAR; INTRAVENOUS; SUBCUTANEOUS at 00:52

## 2020-01-20 RX ADMIN — Medication 1 TABLET(S): at 11:59

## 2020-01-20 RX ADMIN — HYDROMORPHONE HYDROCHLORIDE 0.5 MILLIGRAM(S): 2 INJECTION INTRAMUSCULAR; INTRAVENOUS; SUBCUTANEOUS at 01:10

## 2020-01-20 RX ADMIN — OXYCODONE HYDROCHLORIDE 5 MILLIGRAM(S): 5 TABLET ORAL at 21:12

## 2020-01-20 RX ADMIN — Medication 100 MILLIGRAM(S): at 11:59

## 2020-01-20 RX ADMIN — BRIMONIDINE TARTRATE 1 DROP(S): 2 SOLUTION/ DROPS OPHTHALMIC at 22:23

## 2020-01-20 RX ADMIN — HYDROMORPHONE HYDROCHLORIDE 0.5 MILLIGRAM(S): 2 INJECTION INTRAMUSCULAR; INTRAVENOUS; SUBCUTANEOUS at 16:53

## 2020-01-20 RX ADMIN — OXYCODONE HYDROCHLORIDE 5 MILLIGRAM(S): 5 TABLET ORAL at 09:44

## 2020-01-20 RX ADMIN — OXYCODONE HYDROCHLORIDE 5 MILLIGRAM(S): 5 TABLET ORAL at 20:42

## 2020-01-20 RX ADMIN — OXYCODONE HYDROCHLORIDE 5 MILLIGRAM(S): 5 TABLET ORAL at 10:14

## 2020-01-20 RX ADMIN — LATANOPROST 1 DROP(S): 0.05 SOLUTION/ DROPS OPHTHALMIC; TOPICAL at 11:59

## 2020-01-20 RX ADMIN — HYDROMORPHONE HYDROCHLORIDE 0.5 MILLIGRAM(S): 2 INJECTION INTRAMUSCULAR; INTRAVENOUS; SUBCUTANEOUS at 20:15

## 2020-01-20 NOTE — PROGRESS NOTE ADULT - SUBJECTIVE AND OBJECTIVE BOX
Seen in SICU, awake, alert    Vital Signs Last 24 Hrs  T(C): 37.8 (01-20-20 @ 15:00), Max: 37.8 (01-20-20 @ 15:00)  T(F): 100 (01-20-20 @ 15:00), Max: 100 (01-20-20 @ 15:00)  HR: 60 (01-20-20 @ 16:00) (58 - 73)  RR: 17 (01-20-20 @ 16:00) (10 - 28)  SpO2: 99% (01-20-20 @ 16:00) (82% - 100%)    Card S1S2  Lungs decr BS b/l  Abd soft  Extr + edema b/l LE R > L, + edema RUE, AVF patent                                                                                                                                     8.2    16.42 )-----------( 439      ( 20 Jan 2020 00:40 )             25.6     20 Jan 2020 00:40    130    |  95     |  27     ----------------------------<  110    3.8     |  23     |  6.37     Ca    8.3        20 Jan 2020 00:40  Phos  4.0       20 Jan 2020 00:40  Mg     2.0       20 Jan 2020 00:40    TPro  5.5    /  Alb  2.2    /  TBili  1.3    /  DBili  0.8    /  AST  11     /  ALT  <5     /  AlkPhos  234    20 Jan 2020 00:40    LIVER FUNCTIONS - ( 20 Jan 2020 00:40 )  Alb: 2.2 g/dL / Pro: 5.5 g/dL / ALK PHOS: 234 U/L / ALT: <5 U/L / AST: 11 U/L / GGT: x           PT/INR - ( 20 Jan 2020 00:40 )   PT: 15.3 sec;   INR: 1.33 ratio      acetaminophen   Tablet .. 975 milliGRAM(s) Oral every 6 hours PRN  allopurinol 100 milliGRAM(s) Oral daily  aMIOdarone    Tablet 200 milliGRAM(s) Oral two times a day  brimonidine 0.2% Ophthalmic Solution 1 Drop(s) Both EYES three times a day  chlorhexidine 2% Cloths 1 Application(s) Topical <User Schedule>  dextrose 5% + sodium chloride 0.9%. 1000 milliLiter(s) IV Continuous <Continuous>  gabapentin 100 milliGRAM(s) Oral every 12 hours  heparin  Infusion 1000 Unit(s)/Hr IV Continuous <Continuous>  HYDROmorphone  Injectable 0.5 milliGRAM(s) IV Push every 3 hours PRN  latanoprost 0.005% Ophthalmic Solution 1 Drop(s) Both EYES daily  lidocaine 2% Jelly 1 milliLiter(s) IntraUrethral two times a day PRN  meropenem  IVPB 500 milliGRAM(s) IV Intermittent every 24 hours  metoprolol tartrate 25 milliGRAM(s) Oral every 12 hours  micafungin IVPB 100 milliGRAM(s) IV Intermittent every 24 hours  Nephro-candace 1 Tablet(s) Oral daily  oxyCODONE    IR 5 milliGRAM(s) Oral every 4 hours PRN  vancomycin  IVPB 500 milliGRAM(s) IV Intermittent <User Schedule>    A/P:    Adm w/Flu A 12/27/19  Hx ESRD on HD  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, DGF  S/p transplant kidney bx 12/13: ATN, focal TMA  S/p PLEX 12/13, 12/15; d/c-d 12/20 w/op HD 3 x wk  Re-adm 12/27, dx w/infected carlos-nephric hematoma   S/p OR 1/1 for washout and repeat renal graft biopsy  Repeat renal bx c/w ATN  Dx w/RLE DVT  Developed gross hematuria  S/p OR 1/10 for infected carlos-nephric hematoma, hemorrhage involving pseudo-aneurysm of anastomosis of renal artery to external iliac vein,   s/p transplant nephrectomy, s/p IVCF   S/p PEA arrest 1/13, intubated, s/p CTA, + PE, R retrop collection, on AC  Abx per ID, off pressors  S/p IR drainage of abd collection  New RUE DVT  Refused HD today  Agreed to HD tomorrow  Fluid removal 2kg as tolerates  D/w transplant team

## 2020-01-20 NOTE — PROGRESS NOTE ADULT - SUBJECTIVE AND OBJECTIVE BOX
[facility] DIVISION OF KIDNEY DISEASES AND HYPERTENSION -- FOLLOW UP NOTE  --------------------------------------------------------------------------------    49 yr old man with ESRD s/p DDRT in Dec 2019 complicated by DGF due to ATN and early TMA thought to be due to prograf. Was on belatacept/MMF/Pred immunosuppression.   He was admitted with influenza, course complicated by perinephric hematoma complicated by enterobacter infection. s/p surgical exploration and wash out. He developed DVT started on heparin drip, subsequent gross hematuria. He was taken to the OR and found to have dehiscence of the ureter-bladder anastomosis as well as vascular anastomosis. He underwent graft nephrectomy on 1/10/20 with bovine biopatch repair of the EIA. On 1/13/20 he had cardiac arrest due to large pulmonary embolism s/p successful resuscitation.   S/p bedside drainage of RLQ collection on 1/15/20, 200cc removed and drain placed.     No major events overnight. Hemodynamically stable. Afebrile.   RUE ultrasound showed DVT in the brachial vein. No thrombus in the subclavian or axillary veins.   Abdominal US showed decreasing size of collection ~ 5x1.7cm.     Feels depressed about being in the hospital and away from his family. Also uspet about the DVT in RUE.   No complaint of pain or shortness of breath.         Standing Inpatient Medications  allopurinol 100 milliGRAM(s) Oral daily  aMIOdarone    Tablet 200 milliGRAM(s) Oral two times a day  brimonidine 0.2% Ophthalmic Solution 1 Drop(s) Both EYES three times a day  fluconAZOLE IVPB 200 milliGRAM(s) IV Intermittent every 24 hours  gabapentin 100 milliGRAM(s) Oral every 12 hours  heparin  Infusion 1000 Unit(s)/Hr IV Continuous <Continuous>  latanoprost 0.005% Ophthalmic Solution 1 Drop(s) Both EYES daily  meropenem  IVPB 500 milliGRAM(s) IV Intermittent every 24 hours  metoprolol tartrate 25 milliGRAM(s) Oral every 12 hours  Nephro-candace 1 Tablet(s) Oral daily  vancomycin  IVPB 500 milliGRAM(s) IV Intermittent <User Schedule>    PRN Inpatient Medications  acetaminophen   Tablet .. 975 milliGRAM(s) Oral every 6 hours PRN  HYDROmorphone  Injectable 0.5 milliGRAM(s) IV Push every 3 hours PRN  lidocaine 2% Jelly 1 milliLiter(s) IntraUrethral two times a day PRN  oxyCODONE    IR 5 milliGRAM(s) Oral every 4 hours PRN          VITALS/PHYSICAL EXAM  --------------------------------------------------------------------------------  T(C): 37 (01-20-20 @ 07:00), Max: 37.7 (01-19-20 @ 11:00)  HR: 65 (01-20-20 @ 09:00) (58 - 81)  BP: 136/66  RR: 17 (01-20-20 @ 09:00) (10 - 28)  SpO2: 99% (01-20-20 @ 09:00) (82% - 100%)  Wt(kg): --        01-19-20 @ 07:01  -  01-20-20 @ 07:00  --------------------------------------------------------  IN: 826 mL / OUT: 340 mL / NET: 486 mL    01-20-20 @ 07:01  -  01-20-20 @ 09:40  --------------------------------------------------------  IN: 48 mL / OUT: 0 mL / NET: 48 mL    Sitting comfortably in chair. Alert and oriented.   Lungs bi basilar crackles present   Abdomen - VAC 200ml/24 hours, BO drain 140ml/24 hour, Abdomen + anasarca, tenderness in the RLQ.  Extremities - B/l extremity edema extending to the thighs, improving with b/l ACE wrapping   RUE edema unchanged from yesterday, RIMA BORJAS+ jeremy           LABS/STUDIES  --------------------------------------------------------------------------------              8.2    16.42 >-----------<  439      [01-20-20 @ 00:40]              25.6     130  |  95  |  27  ----------------------------<  110      [01-20-20 @ 00:40]  3.8   |  23  |  6.37        Ca     8.3     [01-20-20 @ 00:40]      Mg     2.0     [01-20-20 @ 00:40]      Phos  4.0     [01-20-20 @ 00:40]    TPro  5.5  /  Alb  2.2  /  TBili  1.3  /  DBili  0.8  /  AST  11  /  ALT  <5  /  AlkPhos  234  [01-20-20 @ 00:40]    PT/INR: PT 15.3 , INR 1.33       [01-20-20 @ 00:40]  PTT: 76.8       [01-20-20 @ 00:40]

## 2020-01-20 NOTE — PROGRESS NOTE ADULT - ASSESSMENT
49 year old male with ESRD on HD, now s/p ddrt on 12/8/2019 course complicated by TMA/profound ATN, who presents from a HD center with a fever and cough. He was initially admitted with influenza. He is s/p washout of infected collection and biopsy which revealed ATN.  He was found to have diffusely enlarging perinephric fluid collection.    While on telemetry, he had an episode of a wide complex tachycardia. It initially appears irregular, suggesting an atrial arrhythmia with aberrancy, though the remainder appears more like an episode of non-sustained VT.   s/p washout on 1/1/2020 with worsening sepsis and transfer to ICU, s/p IR drainage of perinephric collection on 1/6. 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 He underwent operative repair of right external iliac artery with pericardial patch, transplant nephrectomy, RLE thrombectomy, and IVC filter on 1/9.     Now s/p corrina ->tachy arrest with tdp, af rvr, now on amio, with large PE    - critical events noted. now off of pressor support. Bp has improved overall  - developed malignant arrhythmias which seemed to start with bradycardia, leading to more tachycardia and bursts of VT and rapid AF.  The apparent af degenerated into torsades.  He was shocked and has been maintaining SR since  -  Currently on a low dose Amio load. Should be loaded to 10g and then decreased to 200mg Qday. If further arrhythmias present would increase Amio to 400mg q12. So far tele has been unremarkable for the last 24 hours  - cont metoprolol as bp tolerates   - arrhythmias were most likely triggered by the massive vte event, are unlikely to represent a primary cardiac issue  - cannot exclude an ischemic contribution, though the likelihood of that is small overall.  - hs trop noted, though in the context of his arrhythmias, and right heart strain from the pe, the trop is more likely to reflect demand than an acs event  - ideally would be on asa, statin when able     - Now extubated. Monitor resp status closely.   - [facility] medicine to evaluate but for now heparin gtt remains appropriate and the source of the thrombus is known  - echo results noted, with preserved/hyperdynamic lv, with RV failure consistent with acute RV overload  - Further cardiac workup will depend on clinical course.   - Patient at high risk for decompensation given the above comorbidities and active medical issues. Critically ill. I have personally spent >35 minutes  of critical care time in examining patient, reviewing chart, discussing care/management.

## 2020-01-20 NOTE — PROGRESS NOTE ADULT - SUBJECTIVE AND OBJECTIVE BOX
[facility] -- Melissa Kelsey, Maximino Nunez, Hamzah Funes Savella  Office # [phone]      Follow Up:  AF, PE, arresst    Subjective/Observations: Patient seen and examined. Events noted. Resting comfortably in bed. No complaints of chest pain,   or palpitations reported. No signs of orthopnea or PND. Coughing more. Dyspnea improving.       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  Carpal tunnel syndrome  Brain cyst: had MRI recently- now gone  Anemia  Gastric ulcer: Long time ago  Rheumatoid arthritis  Renal transplant recipient  Breakdown (mechanical) of penile (implanted) prosthesis, sequela  End-stage renal disease (ESRD): PERMACATH PLACEMENT  Abscess of left buttock: s/p I &amp; D  AV fistula: placement left lower arm  S/P cystoscopy: stent placement &amp; removal for kidney stones, lithiotripsy  s/p Lt Carpal tunnel: 2011      MEDICATIONS  (STANDING):  allopurinol 100 milliGRAM(s) Oral daily  aMIOdarone    Tablet 200 milliGRAM(s) Oral two times a day  brimonidine 0.2% Ophthalmic Solution 1 Drop(s) Both EYES three times a day  chlorhexidine 2% Cloths 1 Application(s) Topical <User Schedule>  dextrose 5% + sodium chloride 0.9%. 1000 milliLiter(s) (10 mL/Hr) IV Continuous <Continuous>  fluconAZOLE IVPB 200 milliGRAM(s) IV Intermittent every 24 hours  gabapentin 100 milliGRAM(s) Oral every 12 hours  heparin  Infusion 1000 Unit(s)/Hr (14 mL/Hr) IV Continuous <Continuous>  latanoprost 0.005% Ophthalmic Solution 1 Drop(s) Both EYES daily  meropenem  IVPB 500 milliGRAM(s) IV Intermittent every 24 hours  metoprolol tartrate 25 milliGRAM(s) Oral every 12 hours  Nephro-candace 1 Tablet(s) Oral daily  vancomycin  IVPB 500 milliGRAM(s) IV Intermittent <User Schedule>    MEDICATIONS  (PRN):  acetaminophen   Tablet .. 975 milliGRAM(s) Oral every 6 hours PRN Mild Pain (1 - 3)  HYDROmorphone  Injectable 0.5 milliGRAM(s) IV Push every 3 hours PRN Severe Pain (7 - 10)  lidocaine 2% Jelly 1 milliLiter(s) IntraUrethral two times a day PRN irritation  oxyCODONE    IR 5 milliGRAM(s) Oral every 4 hours PRN Moderate Pain (4 - 6)      Allergies    coconut (Anaphylaxis)  morphine (Pruritus; Rash)    Intolerances            Vital Signs Last 24 Hrs  T(C): 37 (20 Jan 2020 07:00), Max: 37.7 (19 Jan 2020 11:00)  T(F): 98.6 (20 Jan 2020 07:00), Max: 99.9 (19 Jan 2020 11:00)  HR: 66 (20 Jan 2020 08:00) (58 - 81)  BP: --  BP(mean): --  RR: 18 (20 Jan 2020 08:00) (10 - 28)  SpO2: 95% (20 Jan 2020 08:00) (82% - 100%)    I&O's Summary    19 Jan 2020 07:01  -  20 Jan 2020 07:00  --------------------------------------------------------  IN: 826 mL / OUT: 340 mL / NET: 486 mL    20 Jan 2020 07:01  -  20 Jan 2020 08:30  --------------------------------------------------------  IN: 24 mL / OUT: 0 mL / NET: 24 mL          PHYSICAL EXAM:  TELE:   Constitutional: NAD, awake    HEENT: Moist Mucous Membranes, Anicteric  Pulmonary: Decreased breath sounds b/l. No rales, crackles or wheeze appreciated.   Cardiovascular: Regular, S1 and S2, No murmurs, rubs, gallops or clicks  Gastrointestinal: Bowel Sounds present, soft, nontender.   Lymph: + peripheral edema. No lymphadenopathy.  Skin: No visible rashes or ulcers.  Psych:  Mood & affect appropriate for situation    LABS: All Labs Reviewed:                        8.2    16.42 )-----------( 439      ( 20 Jan 2020 00:40 )             25.6                         8.1    16.77 )-----------( 397      ( 19 Jan 2020 01:39 )             25.2                         8.8    27.27 )-----------( 403      ( 18 Jan 2020 01:32 )             27.3     20 Jan 2020 00:40    130    |  95     |  27     ----------------------------<  110    3.8     |  23     |  6.37   19 Jan 2020 01:39    134    |  97     |  22     ----------------------------<  107    3.8     |  24     |  5.28   18 Jan 2020 01:36    136    |  95     |  17     ----------------------------<  110    3.8     |  23     |  4.31     Ca    8.3        20 Jan 2020 00:40  Ca    8.2        19 Jan 2020 01:39  Ca    8.2        18 Jan 2020 01:36  Phos  4.0       20 Jan 2020 00:40  Phos  3.8       19 Jan 2020 01:39  Phos  3.5       18 Jan 2020 01:36  Mg     2.0       20 Jan 2020 00:40  Mg     2.0       19 Jan 2020 01:39  Mg     2.0       18 Jan 2020 01:36    TPro  5.5    /  Alb  2.2    /  TBili  1.3    /  DBili  0.8    /  AST  11     /  ALT  <5     /  AlkPhos  234    20 Jan 2020 00:40  TPro  5.2    /  Alb  2.1    /  TBili  1.4    /  DBili  1.0    /  AST  9      /  ALT  <5     /  AlkPhos  203    19 Jan 2020 01:39  TPro  5.3    /  Alb  2.4    /  TBili  1.5    /  DBili  1.0    /  AST  9      /  ALT  <5     /  AlkPhos  207    18 Jan 2020 01:36    PT/INR - ( 20 Jan 2020 00:40 )   PT: 15.3 sec;   INR: 1.33 ratio         PTT - ( 20 Jan 2020 00:40 )  PTT:76.8 sec

## 2020-01-20 NOTE — PHYSICAL THERAPY INITIAL EVALUATION ADULT - PHYSICAL ASSIST/NONPHYSICAL ASSIST: SIT/STAND, REHAB EVAL
nonverbal cues (demo/gestures)/verbal cues/1 person assist
verbal cues/1 person assist/nonverbal cues (demo/gestures)

## 2020-01-20 NOTE — PHYSICAL THERAPY INITIAL EVALUATION ADULT - PERTINENT HX OF CURRENT PROBLEM, REHAB EVAL
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 S/P DDRT on 12/8/19 (ureteral stent sutured to [place] - removed 12/15). Post op course c/b DGF and profound ATN, was d/c'd home 12/20/19 w/ outpatient HD. Sent to ED by HD ctr with fever of 102. Hosp Course: Renal sono +perinephric hematoma; +influenza; 1/1 OR for above; on IV abx; 1/5 became hypotensive and sent to SICU;
50 y/o M with ESRD s/p DDRT on 12/8/19 with course complicated by delayed graft function requiring HD, and infected perinephric hematoma s/p washout on 1/1/2020 with worsening sepsis and transfer to ICU, s/p IR drainage of perinephric collection on 1/6. 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

## 2020-01-20 NOTE — PROGRESS NOTE ADULT - SUBJECTIVE AND OBJECTIVE BOX
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 Patient was admitted and treated with Tamiflu. A Duplex of the transplanted kidney was obtained, which demonstrated an increase in the perinephric hematoma from 5.7 cm to 9.4 cm. Patient began having dysuria so a urine culture was obtained on 12/29, which was positive for Enterobacter cloacae. Given the DGF and positive urine culture, decision was made to take the patient to the OR on 1/1 for right groin exploration, washout of the perinephric hematoma, and biopsy of the transplanted kidney. Cultures of the hematoma were positive for Enterobacter cloacae & E. coli. Patient was recovering on the floors when he became febrile and hypotensive with new episodes of wide complex tachycardia on telemetry on 1/5. Labs were also significant for worsening leukocytosis. Cultures were sent and imaging was obtained, which demonstrated a persistent perinephric collection. Patient subsequently admitted to SICU for further management. Patient underwent FNA w/ IR on 1/6 with aspiration of 18 mL of clear & pale yellow fluid. Cultures of the fluid had no growth. Shortly after the procedure, patient began having gross hematuria requiring CBI. Repeat imaging on 1/7 demonstrated no change in the size of the perinephric collection. RLE edema was noted on 1/8 so a LE Duplex was obtained, which demonstrated a right external iliac & common femoral vein DVT. Vascular surgery was consulted and patient was started on a heparin infusion for anticoagulation. 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 Patient was admitted and hospital course is as follows:    12/27 - started on Tamiflu, Duplex of the transplanted kidney demonstrated an increase in the perinephric hematoma from 5.7 cm to 9.4 cm  12/29 - patient reporting dysuria so urine culture sent, which was positive for Enterobacter cloacae  01/01 - s/p right groin exploration, washout of the perinephric hematoma, and biopsy of the transplanted kidney, cultures of the hematoma positive for Enterobacter cloacae & E. coli, biopsy demonstrates  01/05 - patient noted to be febrile and hypotensive with new episodes of wide complex tachycardia on telemetry, labs significant for worsening leukocytosis, cultures sent, CT scan obtained & demonstrated a persistent perinephric collection, admitted to SICU for hemodynamic monitoring  01/06 - FNA w/ IR with aspiration of 18 mL of clear & pale yellow fluid but cultures negative for any growth, patient began having gross hematuria requiring CBI  01/07 - repeat imaging demonstrated no change in the size of the perinephric collection  01/08 - RLE edema was noted so a LE Duplex was obtained, which demonstrated a right external iliac & common femoral vein DVT, vascular surgery consulted, patient started on a heparin infusion  01/10 - patient became altered with worsening hematuria despite CBI, patient was taken to IR for an angiogram, which demonstrated a pseudoaneurysm of the transplant renal artery & external iliac artery anastomosis so the pseudoaneurysm was stented & an IVC filter was placed, patient taken to the OR and is/p right groin exploration, washout of the RP space, transplant nephrectomy w/ repair of the right external iliac artery w/ a bovine patch & removal of the stent placed by IR, and thrombectomy of the RLE veins, returned to SICU still intubated  01/13 - sudden cardiac arrest on 1/13 24 HOUR EVENTS:  - RUE Duplex with thrombosis of the brachial & cephalic veins  - Restarted metoprolol 25 mg PO BID  - RLQ ultrasound performed overnight    HISTORY:  48 y/o male with a PMHx of HTN, gout, glaucoma, RA, 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 Patient was admitted and hospital course is as follows:    12/27 - started on Tamiflu, Duplex of the transplanted kidney demonstrated an increase in the perinephric hematoma from 5.7 cm to 9.4 cm  12/29 - patient reporting dysuria so urine culture sent, which was positive for Enterobacter cloacae  01/01 - s/p right groin exploration, washout of the perinephric hematoma, and biopsy of the transplanted kidney, cultures of the hematoma positive for Enterobacter cloacae & E. coli, biopsy demonstrates  01/05 - patient noted to be febrile and hypotensive with new episodes of wide complex tachycardia on telemetry, labs significant for worsening leukocytosis, cultures sent, CT scan obtained & demonstrated a persistent perinephric collection, admitted to SICU for hemodynamic monitoring  01/06 - FNA w/ IR with aspiration of 18 mL of clear & pale yellow fluid but cultures negative for any growth, patient began having gross hematuria requiring CBI  01/07 - repeat imaging demonstrated no change in the size of the perinephric collection  01/08 - RLE edema was noted so a LE Duplex was obtained, which demonstrated a right external iliac & common femoral vein DVT, vascular surgery consulted, patient started on a heparin infusion  01/10 - patient became altered with worsening hematuria despite CBI, patient was taken to IR for an angiogram, which demonstrated a pseudoaneurysm of the transplant renal artery & external iliac artery anastomosis so the pseudoaneurysm was stented & an IVC filter was placed, patient taken to the OR and is/p right groin exploration, washout of the RP space, transplant nephrectomy w/ repair of the right external iliac artery w/ a bovine patch & removal of the stent placed by IR, and thrombectomy of the RLE veins, returned to SICU still intubated  01/13 - acutely bradycardic and hypotensive progressing into PEA arrest with progression into torsades requiring ACLS for a total of ~12 mins, imaging revealed saddle PE with TTE demonstrating RV strain, PERT team consulted, management with heparin infusion as patient is not a tPA candidate, did not require vasopressors shortly after ROSC  01/15 - ultrasound with large right iliac fossa collection s/p percutaneous drainage by IR, 220 mL of dark red fluid drained and 10 Fr drain was left in placed  01/16 - extubated    SUBJECTIVE/ROS:  [x] A ten-point review of systems was otherwise negative except as noted.  [ ] Due to altered mental status/intubation, subjective information were not able to be obtained from the patient. History was obtained, to the extent possible, from review of the chart and collateral sources of information.    NEURO  Exam: awake, alert, oriented x4, no acute distress, no focal deficits  Meds:  - acetaminophen   Tablet .. 975 milliGRAM(s) Oral every 6 hours PRN Mild Pain (1 - 3)  - gabapentin 100 milliGRAM(s) Oral every 12 hours  - HYDROmorphone  Injectable 0.5 milliGRAM(s) IV Push every 3 hours PRN Severe Pain (7 - 10)  - oxyCODONE    IR 5 milliGRAM(s) Oral every 4 hours PRN Moderate Pain (4 - 6)  [x] Adequacy of sedation and pain control has been assessed and adjusted      RESPIRATORY  RR: 15 (01-20-20 @ 04:00) (10 - 25)  SpO2: 97% (01-20-20 @ 04:00) (82% - 100%)  Wt(kg): --  Exam:   Mechanical Ventilation:     [ ] Extubation Readiness Assessed  Meds:       CARDIOVASCULAR  HR: 60 (01-20-20 @ 04:00) (58 - 81)  BP: --  BP(mean): --  ABP: 107/56 (01-20-20 @ 04:00) (99/52 - 169/75)  ABP(mean): 73 (01-20-20 @ 04:00) (65 - 706)  Wt(kg): --  CVP(cm H2O): --      Exam:  Cardiac Rhythm:  Perfusion     [ ]Adequate   [ ]Inadequate  Mentation   [ ]Normal       [ ]Reduced  Extremities  [ ]Warm         [ ]Cool  Volume Status [ ]Hypervolemic [ ]Euvolemic [ ]Hypovolemic  Meds: aMIOdarone    Tablet 200 milliGRAM(s) Oral two times a day  metoprolol tartrate 25 milliGRAM(s) Oral every 12 hours        GI/NUTRITION  Exam:  Diet:  Meds:     GENITOURINARY  I&O's Detail    01-18 @ 07:01 - 01-19 @ 07:00  --------------------------------------------------------  IN:    dextrose 5% + sodium chloride 0.9%.: 240 mL    heparin Infusion: 290 mL    Oral Fluid: 150 mL    Other: 600 mL    Solution: 350 mL  Total IN: 1630 mL    OUT:    Bulb: 250 mL    Other: 2600 mL  Total OUT: 2850 mL    Total NET: -1220 mL      01-19 @ 07:01 - 01-20 @ 05:27  --------------------------------------------------------  IN:    dextrose 5% + sodium chloride 0.9%.: 210 mL    heparin Infusion: 294 mL    Solution: 250 mL  Total IN: 754 mL    OUT:    Bulb: 60 mL    VAC (Vacuum Assisted Closure) System: 200 mL  Total OUT: 260 mL    Total NET: 494 mL          01-20    130<L>  |  95<L>  |  27<H>  ----------------------------<  110<H>  3.8   |  23  |  6.37<H>    Ca    8.3<L>      20 Jan 2020 00:40  Phos  4.0     01-20  Mg     2.0     01-20    TPro  5.5<L>  /  Alb  2.2<L>  /  TBili  1.3<H>  /  DBili  0.8<H>  /  AST  11  /  ALT  <5<L>  /  AlkPhos  234<H>  01-20    [ ] Kennedy catheter, indication:   Meds: dextrose 5% + sodium chloride 0.9%. 1000 milliLiter(s) IV Continuous <Continuous>  Nephro-candace 1 Tablet(s) Oral daily        HEMATOLOGIC  Meds: heparin  Infusion 1000 Unit(s)/Hr IV Continuous <Continuous>    [x] VTE Prophylaxis                        8.2    16.42 )-----------( 439      ( 20 Jan 2020 00:40 )             25.6     PT/INR - ( 20 Jan 2020 00:40 )   PT: 15.3 sec;   INR: 1.33 ratio         PTT - ( 20 Jan 2020 00:40 )  PTT:76.8 sec  Transfusion     [ ] PRBC   [ ] Platelets   [ ] FFP   [ ] Cryoprecipitate      INFECTIOUS DISEASES  T(C): 37 (01-20-20 @ 03:00), Max: 37.7 (01-19-20 @ 11:00)  Wt(kg): --  WBC Count: 16.42 K/uL (01-20 @ 00:40)    Recent Cultures:  Specimen Source: .Sputum Sputum, trap, 01-18 @ 15:26; Results   Normal Respiratory Gillian present; Gram Stain:   No polymorphonuclear leukocytes per low power field  Rare Squamous epithelial cells per low power field  Rare Gram positive cocci in pairs per oil power field; Organism: --  Specimen Source: .Blood Blood, 01-16 @ 14:51; Results   No growth to date.; Gram Stain: --; Organism: --  Specimen Source: .Body Fluid Abdominal Fluid, 01-15 @ 22:02; Results   No growth; Gram Stain:   polymorphonuclear leukocytes seen  No organisms seen  by cytocentrifuge; Organism: --    Meds: fluconAZOLE IVPB 400 milliGRAM(s) IV Intermittent every 24 hours  meropenem  IVPB 500 milliGRAM(s) IV Intermittent every 24 hours  vancomycin  IVPB 500 milliGRAM(s) IV Intermittent <User Schedule>        ENDOCRINE  Capillary Blood Glucose    Meds: allopurinol 100 milliGRAM(s) Oral daily        ACCESS DEVICES:  [ ] Peripheral IV  [ ] Central Venous Line	[ ] R	[ ] L	[ ] IJ	[ ] Fem	[ ] SC	Placed:   [ ] Arterial Line		[ ] R	[ ] L	[ ] Fem	[ ] Rad	[ ] Ax	Placed:   [ ] PICC:					[ ] Mediport  [ ] Urinary Catheter, Date Placed:   [ ] Necessity of urinary, arterial, and venous catheters discussed    OTHER MEDICATIONS:  brimonidine 0.2% Ophthalmic Solution 1 Drop(s) Both EYES three times a day  chlorhexidine 2% Cloths 1 Application(s) Topical <User Schedule>  latanoprost 0.005% Ophthalmic Solution 1 Drop(s) Both EYES daily  lidocaine 2% Jelly 1 milliLiter(s) IntraUrethral two times a day PRN      CODE STATUS:     IMAGING: 24 HOUR EVENTS:  - RUE Duplex with thrombosis of the brachial & cephalic veins  - Restarted metoprolol 25 mg PO BID  - RLQ ultrasound performed overnight    HISTORY:  50 y/o male with a PMHx of HTN, gout, glaucoma, RA, 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 Patient was admitted and hospital course is as follows:    12/27 - started on Tamiflu, Duplex of the transplanted kidney demonstrated an increase in the perinephric hematoma from 5.7 cm to 9.4 cm  12/29 - patient reporting dysuria so urine culture sent, which was positive for Enterobacter cloacae  01/01 - s/p right groin exploration, washout of the perinephric hematoma, and biopsy of the transplanted kidney, cultures of the hematoma positive for Enterobacter cloacae & E. coli, biopsy demonstrates  01/05 - patient noted to be febrile and hypotensive with new episodes of wide complex tachycardia on telemetry, labs significant for worsening leukocytosis, cultures sent, CT scan obtained & demonstrated a persistent perinephric collection, admitted to SICU for hemodynamic monitoring  01/06 - FNA w/ IR with aspiration of 18 mL of clear & pale yellow fluid but cultures negative for any growth, patient began having gross hematuria requiring CBI  01/07 - repeat imaging demonstrated no change in the size of the perinephric collection  01/08 - RLE edema was noted so a LE Duplex was obtained, which demonstrated a right external iliac & common femoral vein DVT, vascular surgery consulted, patient started on a heparin infusion  01/10 - patient became altered with worsening hematuria despite CBI, patient was taken to IR for an angiogram, which demonstrated a pseudoaneurysm of the transplant renal artery & external iliac artery anastomosis so the pseudoaneurysm was stented & an IVC filter was placed, patient taken to the OR and is/p right groin exploration, washout of the RP space, transplant nephrectomy w/ repair of the right external iliac artery w/ a bovine patch & removal of the stent placed by IR, and thrombectomy of the RLE veins, returned to SICU still intubated  01/13 - acutely bradycardic and hypotensive progressing into PEA arrest with progression into torsades requiring ACLS for a total of ~12 mins, imaging revealed saddle PE with TTE demonstrating RV strain, PERT team consulted, management with heparin infusion as patient is not a tPA candidate, did not require vasopressors shortly after ROSC  01/15 - ultrasound with large right iliac fossa collection s/p percutaneous drainage by IR, 220 mL of dark red fluid drained and 10 Fr drain was left in placed  01/16 - extubated    SUBJECTIVE/ROS:  [x] A ten-point review of systems was otherwise negative except as noted.  [ ] Due to altered mental status/intubation, subjective information were not able to be obtained from the patient. History was obtained, to the extent possible, from review of the chart and collateral sources of information.    NEURO  Exam: awake, alert, oriented x4, no acute distress, no focal deficits  Meds:  - acetaminophen   Tablet .. 975 milliGRAM(s) Oral every 6 hours PRN Mild Pain (1 - 3)  - gabapentin 100 milliGRAM(s) Oral every 12 hours  - HYDROmorphone  Injectable 0.5 milliGRAM(s) IV Push every 3 hours PRN Severe Pain (7 - 10)  - oxyCODONE    IR 5 milliGRAM(s) Oral every 4 hours PRN Moderate Pain (4 - 6)  [x] Adequacy of sedation and pain control has been assessed and adjusted    RESPIRATORY  RR: 15 (01-20-20 @ 04:00) (10 - 25)  SpO2: 97% (01-20-20 @ 04:00) (82% - 100%)  Exam: clear to auscultation bilaterally  Mechanical Ventilation: no  [N/A] Extubation Readiness Assessed  Meds: none    CARDIOVASCULAR  HR: 60 (01-20-20 @ 04:00) (58 - 81)  ABP: 107/56 (01-20-20 @ 04:00) (99/52 - 169/75)  ABP(mean): 73 (01-20-20 @ 04:00) (65 - 706)  Exam: regular rate and rhythm, S1S2  Cardiac Rhythm: sinus  Perfusion    [x]Adequate    [ ]Inadequate  Mentation   [x]Normal       [ ]Reduced  Extremities  [x]Warm         [ ]Cool  Volume Status [ ]Hypervolemic [x]Euvolemic [ ]Hypovolemic  Meds:  - aMIOdarone    Tablet 200 milliGRAM(s) Oral two times a day  - metoprolol tartrate 25 milliGRAM(s) Oral every 12 hours    GI/NUTRITION  Exam: soft, nondistended, mild carlos-incisional tenderness, surgical incision C/D/I and well-healing, 10 Fr IR drain to bulb suction draining minimal amount of serosanguineous fluid  Diet: regular  Meds: none    GENITOURINARY  I&O's Detail  01-19 @ 07:01  -  01-20 @ 05:27  --------------------------------------------------------  IN:    dextrose 5% + sodium chloride 0.9%.: 210 mL    heparin Infusion: 294 mL    Solution: 250 mL  Total IN: 754 mL    OUT:    Bulb: 60 mL    VAC (Vacuum Assisted Closure) System: 200 mL  Total OUT: 260 mL    Total NET: 494 mL    130<L>  |  95<L>  |  27<H>  ----------------------------<  110<H>  3.8   |  23  |  6.37<H>    Ca    8.3<L>      20 Jan 2020 00:40  Phos  4.0  Mg     2.0  TPro  5.5<L>  /  Alb  2.2<L>  /  TBili  1.3<H>  /  DBili  0.8<H>  /  AST  11  /  ALT  <5<L>  /  AlkPhos  234<H>    [x] Kennedy catheter, indication: urinary retention  Meds: dextrose 5% + sodium chloride 0.9% infuse at 10 mL/hr    HEMATOLOGIC  Meds: heparin  Infusion 1000 Unit(s)/Hr IV Continuous <Continuous>  [x] VTE Prophylaxis                        8.2    16.42 )-----------( 439      ( 20 Jan 2020 00:40 )             25.6     PT/INR - ( 20 Jan 2020 00:40 )   PT: 15.3 sec;   INR: 1.33 ratio    PTT - ( 20 Jan 2020 00:40 )  PTT:76.8 sec    INFECTIOUS DISEASES  T(C): 37 (01-20-20 @ 03:00), Max: 37.7 (01-19-20 @ 11:00)  WBC Count: 16.42 K/uL (01-20 @ 00:40)  Recent Cultures:  - Specimen Source: .Sputum Sputum, trap, 01-18 @ 15:26  Results: Normal Respiratory Gillian present  Gram Stain: No polymorphonuclear leukocytes per low power field; Rare Squamous epithelial cells per low power field; Rare Gram positive cocci in pairs per oil power field  Organism: --  - Specimen Source: .Blood Blood, 01-16 @ 14:51  Results: No growth to date.  Gram Stain: --  Organism: --  - Specimen Source: .Body Fluid Abdominal Fluid, 01-15 @ 22:02  Results: No growth  Gram Stain: Polymorphonuclear leukocytes seen; No organisms seen by cytocentrifuge  Organism: --  Meds:  - fluconAZOLE IVPB 400 milliGRAM(s) IV Intermittent every 24 hours  - meropenem  IVPB 500 milliGRAM(s) IV Intermittent every 24 hours  - vancomycin  IVPB 500 milliGRAM(s) IV Intermittent <User Schedule>    ENDOCRINE  Capillary Blood Glucose: none  Meds: allopurinol 100 milliGRAM(s) Oral daily    ACCESS DEVICES:  [x] Peripheral IV  [x] Central Venous Line	[ ] R	[x] L	[ ] IJ	[x] Fem	[ ] SC	Placed: 1/13  [x] Arterial Line		[x] R	[ ] L	[ ] Fem	[x] Rad	[ ] Ax	Placed: 1/10  [ ] PICC:					[ ] Mediport  [x] Urinary Catheter, Date Placed: 1/6  [x] Necessity of urinary, arterial, and venous catheters discussed    OTHER MEDICATIONS:  - brimonidine 0.2% Ophthalmic Solution 1 Drop(s) Both EYES three times a day  - chlorhexidine 2% Cloths 1 Application(s) Topical <User Schedule>  - latanoprost 0.005% Ophthalmic Solution 1 Drop(s) Both EYES daily  - lidocaine 2% Jelly 1 milliLiter(s) IntraUrethral two times a day PRN  - Nephro-candace 1 Tablet(s) Oral daily    CODE STATUS: Full code    IMAGING: 24 HOUR EVENTS:  - RUE Duplex with thrombosis of the brachial & cephalic veins  - Restarted metoprolol 25 mg PO BID  - RLQ ultrasound performed overnight    HISTORY:  50 y/o male with a PMHx of HTN, gout, glaucoma, RA, 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 Patient was admitted and hospital course is as follows:    12/27 - started on Tamiflu, Duplex of the transplanted kidney demonstrated an increase in the perinephric hematoma from 5.7 cm to 9.4 cm  12/29 - patient reporting dysuria so urine culture sent, which was positive for Enterobacter cloacae  01/01 - s/p right groin exploration, washout of the perinephric hematoma, and biopsy of the transplanted kidney, cultures of the hematoma positive for Enterobacter cloacae & E. coli, biopsy demonstrates  01/05 - patient noted to be febrile and hypotensive with new episodes of wide complex tachycardia on telemetry, labs significant for worsening leukocytosis, cultures sent, CT scan obtained & demonstrated a persistent perinephric collection, admitted to SICU for hemodynamic monitoring  01/06 - FNA w/ IR with aspiration of 18 mL of clear & pale yellow fluid but cultures negative for any growth, patient began having gross hematuria requiring CBI  01/07 - repeat imaging demonstrated no change in the size of the perinephric collection  01/08 - RLE edema was noted so a LE Duplex was obtained, which demonstrated a right external iliac & common femoral vein DVT, vascular surgery consulted, patient started on a heparin infusion  01/10 - patient became altered with worsening hematuria despite CBI, patient was taken to IR for an angiogram, which demonstrated a pseudoaneurysm of the transplant renal artery & external iliac artery anastomosis so the pseudoaneurysm was stented & an IVC filter was placed, patient taken to the OR and is s/p right groin exploration, washout of the RP space, transplant nephrectomy w/ repair of the right external iliac artery w/ a bovine patch & removal of the stent placed by IR, and thrombectomy of the RLE veins, returned to SICU still intubated  01/13 - acutely bradycardic and hypotensive progressing into PEA arrest with progression into torsades requiring ACLS for a total of ~12 mins, imaging revealed saddle PE with TTE demonstrating RV strain, PERT team consulted, management with heparin infusion as patient is not a tPA candidate, did not require vasopressors shortly after ROSC  01/15 - ultrasound with large right iliac fossa collection s/p percutaneous drainage by IR, 220 mL of dark red fluid drained and 10 Fr drain was left in placed  01/16 - extubated    SUBJECTIVE/ROS:  [x] A ten-point review of systems was otherwise negative except as noted.  [ ] Due to altered mental status/intubation, subjective information were not able to be obtained from the patient. History was obtained, to the extent possible, from review of the chart and collateral sources of information.    NEURO  Exam: awake, alert, oriented x4, no acute distress, no focal deficits  Meds:  - acetaminophen   Tablet .. 975 milliGRAM(s) Oral every 6 hours PRN Mild Pain (1 - 3)  - gabapentin 100 milliGRAM(s) Oral every 12 hours  - HYDROmorphone  Injectable 0.5 milliGRAM(s) IV Push every 3 hours PRN Severe Pain (7 - 10)  - oxyCODONE    IR 5 milliGRAM(s) Oral every 4 hours PRN Moderate Pain (4 - 6)  [x] Adequacy of sedation and pain control has been assessed and adjusted    RESPIRATORY  RR: 15 (01-20-20 @ 04:00) (10 - 25)  SpO2: 97% (01-20-20 @ 04:00) (82% - 100%)  Exam: clear to auscultation bilaterally  Mechanical Ventilation: no  [N/A] Extubation Readiness Assessed  Meds: none    CARDIOVASCULAR  HR: 60 (01-20-20 @ 04:00) (58 - 81)  ABP: 107/56 (01-20-20 @ 04:00) (99/52 - 169/75)  ABP(mean): 73 (01-20-20 @ 04:00) (65 - 706)  Exam: regular rate and rhythm, S1S2  Cardiac Rhythm: sinus  Perfusion    [x]Adequate    [ ]Inadequate  Mentation   [x]Normal       [ ]Reduced  Extremities  [x]Warm         [ ]Cool  Volume Status [ ]Hypervolemic [x]Euvolemic [ ]Hypovolemic  Meds:  - aMIOdarone    Tablet 200 milliGRAM(s) Oral two times a day  - metoprolol tartrate 25 milliGRAM(s) Oral every 12 hours    GI/NUTRITION  Exam: soft, nondistended, mild carlos-incisional tenderness, surgical incision C/D/I and well-healing, 10 Fr IR drain to bulb suction draining minimal amount of serosanguineous fluid  Diet: regular  Meds: none    GENITOURINARY  I&O's Detail  01-19 @ 07:01  -  01-20 @ 05:27  --------------------------------------------------------  IN:    dextrose 5% + sodium chloride 0.9%.: 210 mL    heparin Infusion: 294 mL    Solution: 250 mL  Total IN: 754 mL    OUT:    Bulb: 60 mL    VAC (Vacuum Assisted Closure) System: 200 mL  Total OUT: 260 mL    Total NET: 494 mL    130<L>  |  95<L>  |  27<H>  ----------------------------<  110<H>  3.8   |  23  |  6.37<H>    Ca    8.3<L>      20 Jan 2020 00:40  Phos  4.0  Mg     2.0  TPro  5.5<L>  /  Alb  2.2<L>  /  TBili  1.3<H>  /  DBili  0.8<H>  /  AST  11  /  ALT  <5<L>  /  AlkPhos  234<H>    [x] Kennedy catheter, indication: urinary retention  Meds: dextrose 5% + sodium chloride 0.9% infuse at 10 mL/hr    HEMATOLOGIC  Meds: heparin  Infusion 1000 Unit(s)/Hr IV Continuous <Continuous>  [x] VTE Prophylaxis                        8.2    16.42 )-----------( 439      ( 20 Jan 2020 00:40 )             25.6     PT/INR - ( 20 Jan 2020 00:40 )   PT: 15.3 sec;   INR: 1.33 ratio    PTT - ( 20 Jan 2020 00:40 )  PTT:76.8 sec    INFECTIOUS DISEASES  T(C): 37 (01-20-20 @ 03:00), Max: 37.7 (01-19-20 @ 11:00)  WBC Count: 16.42 K/uL (01-20 @ 00:40)  Recent Cultures:  - Specimen Source: .Sputum Sputum, trap, 01-18 @ 15:26  Results: Normal Respiratory Gillian present  Gram Stain: No polymorphonuclear leukocytes per low power field; Rare Squamous epithelial cells per low power field; Rare Gram positive cocci in pairs per oil power field  Organism: --  - Specimen Source: .Blood Blood, 01-16 @ 14:51  Results: No growth to date.  Gram Stain: --  Organism: --  - Specimen Source: .Body Fluid Abdominal Fluid, 01-15 @ 22:02  Results: No growth  Gram Stain: Polymorphonuclear leukocytes seen; No organisms seen by cytocentrifuge  Organism: --  Meds:  - fluconAZOLE IVPB 400 milliGRAM(s) IV Intermittent every 24 hours  - meropenem  IVPB 500 milliGRAM(s) IV Intermittent every 24 hours  - vancomycin  IVPB 500 milliGRAM(s) IV Intermittent <User Schedule>    ENDOCRINE  Capillary Blood Glucose: none  Meds: allopurinol 100 milliGRAM(s) Oral daily    ACCESS DEVICES:  [x] Peripheral IV  [x] Central Venous Line	[ ] R	[x] L	[ ] IJ	[x] Fem	[ ] SC	Placed: 1/13  [x] Arterial Line		[x] R	[ ] L	[ ] Fem	[x] Rad	[ ] Ax	Placed: 1/10  [ ] PICC:					[ ] Mediport  [x] Urinary Catheter, Date Placed: 1/6  [x] Necessity of urinary, arterial, and venous catheters discussed    OTHER MEDICATIONS:  - brimonidine 0.2% Ophthalmic Solution 1 Drop(s) Both EYES three times a day  - chlorhexidine 2% Cloths 1 Application(s) Topical <User Schedule>  - latanoprost 0.005% Ophthalmic Solution 1 Drop(s) Both EYES daily  - lidocaine 2% Jelly 1 milliLiter(s) IntraUrethral two times a day PRN  - Nephro-candace 1 Tablet(s) Oral daily    CODE STATUS: Full code    IMAGING: 24 HOUR EVENTS:  - RUE Duplex with thrombosis of the brachial & cephalic veins  - Restarted metoprolol 25 mg PO BID  - RLQ ultrasound performed overnight    HISTORY:  50 y/o male with a PMHx of HTN, gout, glaucoma, RA, 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 Patient was admitted and hospital course is as follows:    12/27 - started on Tamiflu, Duplex of the transplanted kidney demonstrated an increase in the perinephric hematoma from 5.7 cm to 9.4 cm  12/29 - patient reporting dysuria so urine culture sent, which was positive for Enterobacter cloacae  01/01 - s/p right groin exploration, washout of the perinephric hematoma, and biopsy of the transplanted kidney, cultures of the hematoma positive for Enterobacter cloacae & E. coli, biopsy demonstrates  01/05 - patient noted to be febrile and hypotensive with new episodes of wide complex tachycardia on telemetry, labs significant for worsening leukocytosis, cultures sent, CT scan obtained & demonstrated a persistent perinephric collection, admitted to SICU for hemodynamic monitoring  01/06 - FNA w/ IR with aspiration of 18 mL of clear & pale yellow fluid but cultures negative for any growth, patient began having gross hematuria requiring CBI  01/07 - repeat imaging demonstrated no change in the size of the perinephric collection  01/08 - RLE edema was noted so a LE Duplex was obtained, which demonstrated a right external iliac & common femoral vein DVT, vascular surgery consulted, patient started on a heparin infusion  01/10 - patient became altered with worsening hematuria despite CBI, patient was taken to IR for an angiogram, which demonstrated a pseudoaneurysm of the transplant renal artery & external iliac artery anastomosis so the pseudoaneurysm was stented & an IVC filter was placed, patient taken to the OR and is s/p right groin exploration, washout of the RP space, transplant nephrectomy w/ repair of the right external iliac artery w/ a bovine patch & removal of the stent placed by IR, and thrombectomy of the RLE veins, returned to SICU still intubated  01/13 - acutely bradycardic and hypotensive progressing into PEA arrest with progression into torsades requiring ACLS for a total of ~12 mins, imaging revealed saddle PE with TTE demonstrating RV strain, PERT team consulted, management with heparin infusion as patient is not a tPA candidate, did not require vasopressors shortly after ROSC  01/15 - ultrasound with large right iliac fossa collection s/p percutaneous drainage by IR, 220 mL of dark red fluid drained and 10 Fr drain was left in placed  01/16 - extubated    SUBJECTIVE/ROS:  [x] A ten-point review of systems was otherwise negative except as noted.  [ ] Due to altered mental status/intubation, subjective information were not able to be obtained from the patient. History was obtained, to the extent possible, from review of the chart and collateral sources of information.    NEURO  Exam: awake, alert, oriented x4, no acute distress, no focal deficits  Meds:  - acetaminophen   Tablet .. 975 milliGRAM(s) Oral every 6 hours PRN Mild Pain (1 - 3)  - gabapentin 100 milliGRAM(s) Oral every 12 hours  - HYDROmorphone  Injectable 0.5 milliGRAM(s) IV Push every 3 hours PRN Severe Pain (7 - 10)  - oxyCODONE    IR 5 milliGRAM(s) Oral every 4 hours PRN Moderate Pain (4 - 6)  [x] Adequacy of sedation and pain control has been assessed and adjusted    RESPIRATORY  RR: 15 (01-20-20 @ 04:00) (10 - 25)  SpO2: 97% (01-20-20 @ 04:00) (82% - 100%)  Exam: clear to auscultation bilaterally  Mechanical Ventilation: no  [N/A] Extubation Readiness Assessed  Meds: none    CARDIOVASCULAR  HR: 60 (01-20-20 @ 04:00) (58 - 81)  ABP: 107/56 (01-20-20 @ 04:00) (99/52 - 169/75)  ABP(mean): 73 (01-20-20 @ 04:00) (65 - 706)  Exam: regular rate and rhythm, S1S2  Cardiac Rhythm: sinus  Perfusion    [x]Adequate    [ ]Inadequate  Mentation   [x]Normal       [ ]Reduced  Extremities  [x]Warm         [ ]Cool  Volume Status [ ]Hypervolemic [x]Euvolemic [ ]Hypovolemic  Meds:  - aMIOdarone    Tablet 200 milliGRAM(s) Oral two times a day  - metoprolol tartrate 25 milliGRAM(s) Oral every 12 hours    GI/NUTRITION  Exam: soft, nondistended, mild carlos-incisional tenderness, surgical incision C/D/I and well-healing, 10 Fr IR drain to bulb suction draining minimal amount of serosanguineous fluid  Diet: regular  Meds: none    GENITOURINARY  I&O's Detail  01-19 @ 07:01  -  01-20 @ 05:27  --------------------------------------------------------  IN:    dextrose 5% + sodium chloride 0.9%.: 210 mL    heparin Infusion: 294 mL    Solution: 250 mL  Total IN: 754 mL    OUT:    Bulb: 60 mL    VAC (Vacuum Assisted Closure) System: 200 mL  Total OUT: 260 mL    Total NET: 494 mL    130<L>  |  95<L>  |  27<H>  ----------------------------<  110<H>  3.8   |  23  |  6.37<H>    Ca    8.3<L>      20 Jan 2020 00:40  Phos  4.0  Mg     2.0  TPro  5.5<L>  /  Alb  2.2<L>  /  TBili  1.3<H>  /  DBili  0.8<H>  /  AST  11  /  ALT  <5<L>  /  AlkPhos  234<H>    [x] Kennedy catheter, indication: urinary retention  Meds: dextrose 5% + sodium chloride 0.9% infuse at 10 mL/hr    HEMATOLOGIC  Meds: heparin  Infusion 1000 Unit(s)/Hr IV Continuous <Continuous>  [x] VTE Prophylaxis                        8.2    16.42 )-----------( 439      ( 20 Jan 2020 00:40 )             25.6     PT/INR - ( 20 Jan 2020 00:40 )   PT: 15.3 sec;   INR: 1.33 ratio    PTT - ( 20 Jan 2020 00:40 )  PTT:76.8 sec    INFECTIOUS DISEASES  T(C): 37 (01-20-20 @ 03:00), Max: 37.7 (01-19-20 @ 11:00)  WBC Count: 16.42 K/uL (01-20 @ 00:40)  Recent Cultures:  - Specimen Source: .Sputum Sputum, trap, 01-18 @ 15:26  Results: Normal Respiratory Gillian present  Gram Stain: No polymorphonuclear leukocytes per low power field; Rare Squamous epithelial cells per low power field; Rare Gram positive cocci in pairs per oil power field  Organism: --  - Specimen Source: .Blood Blood, 01-16 @ 14:51  Results: No growth to date.  Gram Stain: --  Organism: --  - Specimen Source: .Body Fluid Abdominal Fluid, 01-15 @ 22:02  Results: No growth  Gram Stain: Polymorphonuclear leukocytes seen; No organisms seen by cytocentrifuge  Organism: --  Meds:  - fluconAZOLE IVPB 200 milliGRAM(s) IV Intermittent every 24 hours  - meropenem  IVPB 500 milliGRAM(s) IV Intermittent every 24 hours  - vancomycin  IVPB 500 milliGRAM(s) IV Intermittent <User Schedule>  - amikacin    ENDOCRINE  Capillary Blood Glucose: none  Meds: allopurinol 100 milliGRAM(s) Oral daily    ACCESS DEVICES:  [x] Peripheral IV  [x] Central Venous Line	[ ] R	[x] L	[ ] IJ	[x] Fem	[ ] SC	Placed: 1/13  [x] Arterial Line		[x] R	[ ] L	[ ] Fem	[x] Rad	[ ] Ax	Placed: 1/10  [ ] PICC:					[ ] Mediport  [x] Urinary Catheter, Date Placed: 1/6  [x] Necessity of urinary, arterial, and venous catheters discussed    OTHER MEDICATIONS:  - brimonidine 0.2% Ophthalmic Solution 1 Drop(s) Both EYES three times a day  - chlorhexidine 2% Cloths 1 Application(s) Topical <User Schedule>  - latanoprost 0.005% Ophthalmic Solution 1 Drop(s) Both EYES daily  - lidocaine 2% Jelly 1 milliLiter(s) IntraUrethral two times a day PRN  - Nephro-candace 1 Tablet(s) Oral daily    CODE STATUS: Full code    IMAGING:

## 2020-01-20 NOTE — PHYSICAL THERAPY INITIAL EVALUATION ADULT - GAIT DEVIATIONS NOTED, PT EVAL
wide base of support/decreased quinton/decreased step length
decreased quinton/decreased swing-to-stance ratio

## 2020-01-20 NOTE — PHYSICAL THERAPY INITIAL EVALUATION ADULT - MANUAL MUSCLE TESTING RESULTS, REHAB EVAL
no strength deficits were identified
BUE and BLE grossly at least 3/5 throughout/grossly assessed due to

## 2020-01-20 NOTE — PROGRESS NOTE ADULT - PROBLEM SELECTOR PLAN 4
Leukocytosis improving, remains at 16 today. Afebrile.   Continue Meropenem, Amikacin , Diflucan and Vanco.   CT chest abdomen and pelvis tomorrow to evaluate for abscess collection   ID follow up

## 2020-01-20 NOTE — PROGRESS NOTE ADULT - SUBJECTIVE AND OBJECTIVE BOX
Transplant Surgery - Multidisciplinary Rounds  --------------------------------------------------------------  R DDRT    Date:  12/8/19     Present:   Patient seen with multidisciplinary team including Transplant Surgeon: Dr. Espinal, Transplant Nephrologist: Dr. Irvin, NP: Stefan during am rounds and examined with Dr. Espinal. Disciplines not in attendance will be notified of the plan.     HPI: 49M PMHx of ESRD on HD  ( via LUE AVF), HTN, Gout, Glaucoma, NET of pancreas (s/p robotic distal panc/splenectomy at [place] 2015 by Dr. Young, followed by Dr. Raphael, [facility] Oncologist), RA with hand contractures. He underwent DDRT on 12/8/19. Post op course c/b DGF requiring HD, thrombocytopenia, elevated LDH and haptoglobin. Schistocytes  on peripheral smear concerning for TMA. Envarsus held. Received Belatacept 12/13. Started on PLEX (12/12, 12/15). Underwent renal bx on 12/13 c/w profound ATN.  Discharged home on 12/20/19 w/ outpatient HD.     Re-admitted 12/27 with influenza, completed treatement with Tamiflu on 12/31. Urine cx on admission grew Ent cloacae, was started on meropenem 12/31. Renal US on admission with increasing hematoma.   ·	OR 1/1 for ex-lap, hematoma evacuation, washout and renal bx. (c/w ATN)  OR cxs grew CRE and e coli. Post op course c/b:   ·	fevers and leukocytosis, CT a/p (1/4) with increased perinephric collection. Underwent IR guided collection 1/6   ·	New onset hematuria, martinez inserted 1/7, started CBI  ·	RLE edema, RLE doppler (1/8) with acute above the knee thrombus of R external iliac, common femoral and femoral veins, Acute calf vein thrombus affecting R soleal vein, Heparin gtt initiated   ·	AMS 1/9  (head CT neg), hyponatremic  ·	Significant hematuria and bleeding around the martinez. Angio 1/9 showed actively bleeding pseudoaneurysm at the anastomosis of the transplant renal artery with the recipient iliac artery. A stent was placed in the iliac artery to  control hemorrhage. IVC filter placed.   ·	Emergent OR 1/9-1/10 for graft nephrectomy;  ureter to bladder anastomosis was completely disrupted, repaired the R external iliac artery with bovine pericardial patch, repaired right external iliac vein, performed thrombectomy of RLE veins, Intra op received 3u PRBC.   ·	Extubated 1/11  ·	1/13 Saddle PE, with right heart strain and complicated by PEA arrest followed by [place], ROSC achieved after ACLS; re-intubated  ·	1/15 Renal US with 13.7 x 5.0 x 1.5 cm in RLQ transplant bed; s/p bedside IR guided drainage, pigtail in place. Fluid cx to date with NG    Interval events:    - vanco started for sputum prelim positive with gram + coci  - RUE duplex US positive for distal brachial vein thrombosis   - RLQ US showed fluid collection now measuring 5x2x2 decreased in size     cxs:   12/29: Urine Cx: Enterobacter Cloacae  1/1 OR Perinephric collection Cx:  Carbapenem resistant Ent Cloacae, E. Coli  1/1: R kidney abscess swab: Enterobacter Cloacae  1/1  OR tissue: Enterobacter Cloacae  1/5: Urine Cx: Enteropbacter Cloacae  1/5: Skin incision Cx (bedside): NG  1/6: Adominal fluid Cx from IR:  NG   1/7: Urine Cx:  NG  1/7: BCX2:  NG  1/10 retroperit hematoma - enerobacter   1/10 bladder hematoma - enterobacter  1/15 Drainage fluid - NGTD    Potential Discharge date: pending clinical improvement    MEDICATIONS  (STANDING):  allopurinol 100 milliGRAM(s) Oral daily  aMIOdarone    Tablet 200 milliGRAM(s) Oral two times a day  brimonidine 0.2% Ophthalmic Solution 1 Drop(s) Both EYES three times a day  chlorhexidine 2% Cloths 1 Application(s) Topical <User Schedule>  dextrose 5% + sodium chloride 0.9%. 1000 milliLiter(s) (10 mL/Hr) IV Continuous <Continuous>  fluconAZOLE IVPB 200 milliGRAM(s) IV Intermittent every 24 hours  gabapentin 100 milliGRAM(s) Oral every 12 hours  heparin  Infusion 1000 Unit(s)/Hr (14 mL/Hr) IV Continuous <Continuous>  latanoprost 0.005% Ophthalmic Solution 1 Drop(s) Both EYES daily  meropenem  IVPB 500 milliGRAM(s) IV Intermittent every 24 hours  metoprolol tartrate 25 milliGRAM(s) Oral every 12 hours  Nephro-candace 1 Tablet(s) Oral daily  vancomycin  IVPB 500 milliGRAM(s) IV Intermittent <User Schedule>    MEDICATIONS  (PRN):  acetaminophen   Tablet .. 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  Carpal tunnel syndrome  Brain cyst: had MRI recently- now gone  Anemia  Gastric ulcer: Long time ago  Rheumatoid arthritis  Renal transplant recipient  Breakdown (mechanical) of penile (implanted) prosthesis, sequela  End-stage renal disease (ESRD): PERMACATH PLACEMENT  Abscess of left buttock: s/p I &amp; D  AV fistula: placement left lower arm  S/P cystoscopy: stent placement &amp; removal for kidney stones, lithiotripsy  s/p Lt Carpal tunnel: 2011    Vital Signs Last 24 Hrs  T(C): 37 (20 Jan 2020 07:00), Max: 37.7 (19 Jan 2020 11:00)  T(F): 98.6 (20 Jan 2020 07:00), Max: 99.9 (19 Jan 2020 11:00)  HR: 64 (20 Jan 2020 10:00) (58 - 81)  BP: --  BP(mean): --  RR: 24 (20 Jan 2020 10:00) (10 - 28)  SpO2: 96% (20 Jan 2020 10:00) (82% - 100%)    I&O's Summary    19 Jan 2020 07:01  -  20 Jan 2020 07:00  --------------------------------------------------------  IN: 826 mL / OUT: 340 mL / NET: 486 mL    20 Jan 2020 07:01  -  20 Jan 2020 10:34  --------------------------------------------------------  IN: 48 mL / OUT: 0 mL / NET: 48 mL        REVIEW OF SYSTEMS  Gen: + weakness   Skin: No rashes  Head/Eyes/Ears/Mouth: No headache; Normal hearing; Normal vision w/o blurriness; No sinus pain/discomfort, sore throat  CBC (01-20 @ 00:40)                          8.2<L>                   16.42<H>  )--------------(  439<H>     --    % Neuts, --    % Lymphs, ANC: --                              25.6<L>  CBC (01-19 @ 01:39)                          8.1<L>                   16.77<H>  )--------------(  397        --    % Neuts, --    % Lymphs, ANC: --                              25.2<L>    BMP (01-20 @ 00:40)       130<L>  |  95<L>   |  27<H> 			Ca++ --      Ca 8.3<L>       ---------------------------------( 110<H>		Mg 2.0          3.8     |  23      |  6.37<H>			Ph 4.0     BMP (01-19 @ 01:39)       134<L>  |  97      |  22    			Ca++ --      Ca 8.2<L>       ---------------------------------( 107<H>		Mg 2.0          3.8     |  24      |  5.28<H>			Ph 3.8       LFTs (01-20 @ 00:40)      TPro 5.5<L> / Alb 2.2<L> / TBili 1.3<H> / DBili 0.8<H> / AST 11 / ALT <5<L> / AlkPhos 234<H>  LFTs (01-19 @ 01:39)      TPro 5.2<L> / Alb 2.1<L> / TBili 1.4<H> / DBili 1.0<H> / AST 9<L> / ALT <5<L> / AlkPhos 203<H>    Coags (01-20 @ 00:40)  aPTT 76.8<H> / INR 1.33<H> / PT 15.3<H>  Coags (01-19 @ 15:45)  aPTT 75.4<H> / INR -- / PT --            -> .Sputum Sputum, trap Culture (01-18 @ 15:26)       No polymorphonuclear leukocytes per low power field  Rare Squamous epithelial cells per low power field  Rare Gram positive cocci in pairs per oil power field    NG    Normal Respiratory Gillian present    -> .Blood Blood Culture (01-16 @ 14:51)     NG    NG    No growth to date.    -> .Body Fluid Abdominal Fluid Culture (01-15 @ 22:02)       polymorphonuclear leukocytes seen  No organisms seen  by cytocentrifuge    NG    No growth    Respiratory: + cough, dry  CV: No chest pain, PND, orthopnea  GI: incisional tenderness   : + martinez  MSK: + lower extrem edema  Neuro: No dizziness/lightheadedness, weakness, seizures, numbness, tingling  Heme: No easy bruising or bleeding  Endo: No heat/cold intolerance  Psych: No significant nervousness, anxiety, stress, depression    PHYSICAL EXAM:    Constitutional: extubated, NAD  	Eyes: Anicteric, PERRLA  	Respiratory: + crackles  	Cardiovascular: RRR  	Gastrointestinal: Soft abdomen, appropriate incisional TTP, ND.  wound vac ss/murky output, RLQ drain with SS drainage    Genitourinary:  Martinez, anuric, residual hematuria in bag    Extremities: b/l lower ext edema, R> L, RUE +3 edema intact with ace wrapped  	Vascular: palpable DP pulses. L AVF palpable  	Neurological: A&O x3.  	Skin: no new lesions/ulcerations  	Musculoskeletal: Moving all extremities

## 2020-01-20 NOTE — PHYSICAL THERAPY INITIAL EVALUATION ADULT - GENERAL OBSERVATIONS, REHAB EVAL
Pt david 30 min re-eval fair. Pt last seen 1/6, course c/b PEA arrest, saddle PE, on heparin (therapeutic). Pt rec'd in chair, wound vac, femoral catheter, BO martinez, RN Job at bedside to assist with lines.
Rec'd in bed, NAD, +RLQ BO holding self suction, +tele, +IV

## 2020-01-20 NOTE — PROGRESS NOTE ADULT - ASSESSMENT
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 RVP with Influenza A s/p Tamiflu treatment course. Now with infected carlos-transplant hematoma.    1/1: s/p Ex-Lap and washout of infected hematoma (Enterobacter from cultures)  1/6: s/p IR guided drainage attempt of hematoma on 1/6 (NGTD from cultures  1/10: s/p transplant nephrectomy, repair of right external iliac artery with a bovine pericardial patch, repair of right external iliac vein and thrombectomy of right lower extremity veins.   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   I suspect Enterobacter is the primary pathogen    PEA arrest and Torsades on 1/13  CT C/A/P (1/13) with Saddle PE, interval increase in size of RP fluid collection (infected hematoma)    Suspect leukocytosis is reactive from residual hematoma, recent PEA Arrest and Saddle PE  I suspect Meropenem monotherapy is sufficient but reasonable to cover short term with Amikacin given rise in WBC  If further rise in WBC count would repeat CT C/A/P (can hold off for now)    Overall, Saddle pulmonary embolus, Leukocytosis (increasing), Infected Hematoma, Positive Urine Culture, Influenza Infection, Fever, Renal Transplant Recipient    --Recommend Amikacin - follow levels and dose accordingly   --Continue Meropenem 500 mg IV Q24H  --Continue to follow CBC with diff  --Continue to follow temperature curve  - on prophylactic diflucan- ? decrease to 100 mg daily - discuss with pharmD  --Follow up on preliminary surgical cultures   --Follow up on preliminary blood cultures   --Management of Saddle PE per 8ICU team and vascular teams  - now with DVT RUE  - ascitic fluid less    ID service will be covering over the weekend. Please call for acute issues or questions. 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 RVP with Influenza A s/p Tamiflu treatment course. Now with infected carlos-transplant hematoma.    1/1: s/p Ex-Lap and washout of infected hematoma (Enterobacter from cultures)  1/6: s/p IR guided drainage attempt of hematoma on 1/6 (NGTD from cultures  1/10: s/p transplant nephrectomy, repair of right external iliac artery with a bovine pericardial patch, repair of right external iliac vein and thrombectomy of right lower extremity veins.   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   I suspect Enterobacter is the primary pathogen    PEA arrest and Torsades on 1/13  CT C/A/P (1/13) with Saddle PE, interval increase in size of RP fluid collection (infected hematoma)    Suspect leukocytosis is reactive from residual hematoma, recent PEA Arrest and Saddle PE  I suspect Meropenem monotherapy is sufficient but reasonable to cover short term with Amikacin given rise in WBC  If further rise in WBC count would repeat CT C/A/P (can hold off for now)    Overall, Saddle pulmonary embolus, Leukocytosis (increasing), Infected Hematoma, Positive Urine Culture, Influenza Infection, Fever, Renal Transplant Recipient    --Recommend Amikacin - follow levels and dose accordingly   --Continue Meropenem 500 mg IV Q24H  --Continue to follow CBC with diff  --Continue to follow temperature curve  - on prophylactic diflucan-  will discuss with team  as patient is on amiodarone and has a h/o torsade  --Follow up on preliminary surgical cultures   --Follow up on preliminary blood cultures   --Management of Saddle PE per 8ICU team and vascular teams  - now with DVT RUE  - ascitic fluid less    ID service will be covering over the weekend. Please call for acute issues or questions. [phone]

## 2020-01-20 NOTE — PHYSICAL THERAPY INITIAL EVALUATION ADULT - GAIT TRAINING, PT EVAL
GOAL: Patient will ambulate 300 feet independently with straight cane
GOAL: Pt will ambulate 150 feet with least restrictive device as appropriate independently in 4 weeks

## 2020-01-20 NOTE — PHYSICAL THERAPY INITIAL EVALUATION ADULT - PLANNED THERAPY INTERVENTIONS, PT EVAL
gait training/transfer training/GOAL: Pt will negotiate 4 steps with unilateral handrail in a step-to pattern independently in 4 weeks/bed mobility training

## 2020-01-20 NOTE — PROGRESS NOTE ADULT - ASSESSMENT
48 y/o male presenting with influenza A with a hospital course complicated by infected transplant perinephric hematoma s/p washout & biopsy, FNA 48 y/o male presenting with influenza A also noted to have infected transplant perinephric hematoma s/p right groin exploration, washout, & biopsy c/b persistent perinephric collection s/p FNA. Hospital course complicated by RLE DVTs, gross hematuria w/ pseudoaneurysm of the right external iliac & transplant renal artery s/p right groin exploration, washout of the RP space, transplant nephrectomy w/ repair of the right external iliac artery w/ a bovine patch, & thrombectomy of the RLE veins 50 y/o male presenting with influenza A with a hospital course complicated by:  - Infected transplant perinephric hematoma s/p right groin exploration, washout, & biopsy c/b persistent perinephric collection s/p FNA  - Right external iliac & common femoral DVTs s/p IVC filter & thrombectomy of the RLE veins  - Gross hematuria w/ pseudoaneurysm of the right external iliac & transplant renal artery seen on IR angiogram s/p right groin exploration, washout of the RP space, and transplant nephrectomy w/ repair of the right external iliac artery w/ a bovine patch  - Persistent right iliac fossa collection s/p IR drainage  - Cardiac arrest secondary to a saddle PE    PLAN:    Neuro: acute post-op pain  - Monitor mental status  - Pain control with acetaminophen, oxycodone, Dilaudid, and gabapentin    Resp: saddle PE  - Monitor pulse oximeter  - Out of bed to chair, ambulate as tolerated, and incentive spirometry to prevent atelectasis    CV: obstructive shock secondary to PE (resolved), s/p PEA arrest with progression to torsades  - Monitor vital signs  - Amiodarone for episode of torsades  - Home metoprolol 25 mg PO q12hrs for HTN    GI: no acute issues  - Regular diet as tolerated  - Bowel regimen with senna & Miralax    Renal: CKD stage V s/p DDRT c/b DGF & perinephric collection  - Monitor I&Os  - Monitor electrolytes and replete as necessary    Heme:  - Monitor CBC and coags  - Lovenox for VTE prophylaxis    ID:   - Monitor for clinical evidence of active infection    Endo:   - Monitor glucose     Disposition:  - Full code  -     WES GrimesC     [id number] 48 y/o male presenting with influenza A with a hospital course complicated by:  - Infected transplant perinephric hematoma s/p right groin exploration, washout, & biopsy c/b persistent perinephric collection s/p FNA  - Right external iliac & common femoral DVTs s/p IVC filter & thrombectomy of the RLE veins  - Gross hematuria w/ pseudoaneurysm of the right external iliac & transplant renal artery seen on IR angiogram s/p right groin exploration, washout of the RP space, and transplant nephrectomy w/ repair of the right external iliac artery w/ a bovine patch  - Persistent right iliac fossa collection s/p IR drainage  - Cardiac arrest secondary to a saddle PE    PLAN:    Neuro: acute post-op pain  - Monitor mental status  - Pain control with acetaminophen, oxycodone, Dilaudid, and gabapentin    Resp: saddle PE  - Monitor pulse oximeter  - Out of bed to chair, ambulate as tolerated, and incentive spirometry to prevent atelectasis    CV: obstructive shock secondary to PE (resolved), s/p PEA arrest with progression to torsades  - Monitor vital signs  - Amiodarone for episode of torsades  - Home metoprolol 25 mg PO q12hrs for HTN    GI: no acute issues  - Regular diet as tolerated  - Bowel regimen with senna & Miralax  - Monitor right iliac fossa IR drain output    Renal: CKD stage V s/p DDRT c/b DGF & perinephric collection s/p right groin exploration, washout, & biopsy c/b persistent perinephric collection s/p FNA; gross hematuria w/ pseudoaneurysm of the right external iliac & transplant renal artery s/p right groin exploration, washout of the RP space, & transplant nephrectomy w/ repair of the right external iliac artery w/ a bovine patch; persistent right iliac fossa collection s/p IR drainage  - Monitor I&Os  - Monitor electrolytes and replete as necessary  - D5NS at 10 mL/hr    Heme: anemia of chronic diseases  - Monitor CBC and coags  - Lovenox for VTE prophylaxis  - Epogen with dialysis as per nephrology    ID: Enterobacter cloacae UTI, transplant perinephric collection & right iliac fossa collection w/ Enterobacter cloacae & E. coli  - Monitor WBC, temperature, and procalcitonin    Endo:   - Monitor glucose     Misc: gout  - Home allopurinol for gout  - Home eyedrops (latanoprost    Disposition:  - Full code  -     Jane Nicole PA-C     [id number] 48 y/o male presenting with influenza A with a hospital course complicated by:  - Infected transplant perinephric hematoma s/p right groin exploration, washout, & biopsy c/b persistent perinephric collection s/p FNA  - Right external iliac & common femoral DVTs s/p IVC filter & thrombectomy of the RLE veins  - Gross hematuria w/ pseudoaneurysm of the right external iliac & transplant renal artery seen on IR angiogram s/p right groin exploration, washout of the RP space, and transplant nephrectomy w/ repair of the right external iliac artery w/ a bovine patch  - Persistent right iliac fossa collection s/p IR drainage  - Cardiac arrest secondary to a saddle PE    PLAN:    Neuro: acute post-op pain  - Monitor mental status  - Pain control with acetaminophen, oxycodone, Dilaudid, and gabapentin    Resp: saddle PE  - Monitor pulse oximeter  - Out of bed to chair, ambulate as tolerated, and incentive spirometry to prevent atelectasis    CV: obstructive shock secondary to PE (resolved), s/p PEA arrest with progression to torsades  - Monitor vital signs  - Amiodarone for episode of torsades  - Home metoprolol 25 mg PO q12hrs for HTN, home hydralazine 50 mg PO BID on hold but can restart if patient remains hypertensive    GI: no acute issues  - Regular diet as tolerated  - Bowel regimen with senna & Miralax  - Monitor right iliac fossa IR drain output    Renal: CKD stage V s/p DDRT c/b DGF & perinephric collection s/p right groin exploration, washout, & biopsy c/b persistent perinephric collection s/p FNA; gross hematuria w/ pseudoaneurysm of the right external iliac & transplant renal artery s/p right groin exploration, washout of the RP space, & transplant nephrectomy w/ repair of the right external iliac artery w/ a bovine patch; persistent right iliac fossa collection s/p IR drainage  - Monitor I&Os  - Monitor electrolytes and replete as necessary  - D5NS at 10 mL/hr  - Dialysis as per nephrology, appreciate nephrology recommendations    Heme: anemia of chronic diseases, saddle PE, right external iliac & common femoral DVTs s/p IVC filter  - Monitor CBC and coags  - Heparin infusion for anticoagulation for DVTs and PE  - Epogen with dialysis as per nephrology    ID: Enterobacter cloacae UTI, transplant perinephric collection & right iliac fossa collection w/ carbapenem resistant Enterobacter cloacae & E. coli  - Monitor WBC, temperature, and procalcitonin  - Empiric antibiotics with meropenem, vancomycin, fluconazole, and amikacin  - Sputum on 1/18 with normal respiratory harvey, blood cultures from 1/16 no growth to date, right iliac fossa collection culture from 1/15 with no growth, RP hematoma culture from 1/10 with carbapenem resistant Enterobacter cloacae    Endo: no acute issues  - Monitor glucose on BMP    Misc: gout, glaucoma  - Home allopurinol for gout  - Home eyedrops (latanoprost, dorzolamide, & brimonidine) for glaucoma  - PT/OT    Disposition:  - Full code  - Will remain in SICU    Jane Nicole PA-C     [id number]

## 2020-01-20 NOTE — PHYSICAL THERAPY INITIAL EVALUATION ADULT - TRANSFER TRAINING, PT EVAL
GOAL: Patient will perform sit to stand transfers independently with cane in 4 weeks with proper hand placement
GOAL: Pt will transfer sit to/from stand with least restrictive device as appropriate independently in 4 weeks

## 2020-01-20 NOTE — PHYSICAL THERAPY INITIAL EVALUATION ADULT - ADDITIONAL COMMENTS
resides with family in private 1 story home with 4 steps to enter which have railing (rear of home)
resides with family in private 1 story home with 4 steps to enter which have railing (rear of home)

## 2020-01-20 NOTE — PHYSICAL THERAPY INITIAL EVALUATION ADULT - PRECAUTIONS/LIMITATIONS, REHAB EVAL
HISTORY AND RESULTS CONTINUED: He underwent operative repair of right external iliac artery with bovine patch, transplant nephrectomy, RLE thrombectomy, and IVC filter on 1/10. Post-op course complicated by PEA arrest with ROSC after 5 cycles of CPR, found to have RV strain on TTE, and CTA with saddle PE. Pt extubated 1/16. CT Pelvis: ORIF of L femur. Doppler 1/8: thrombus affecting R external iliac, common femoral and femoral veins; acute calf vein thrombus affecting R soleal vein. CTA chest/abdomen/pelvis 1/13: saddle PE with extension into R upper, R lower, and L lower lobar arteries. Thrombus at leele of the IVC filter. RUE doppler: DVT in mid to distal brachial vein. Thrombosis of cephalic vein.

## 2020-01-20 NOTE — PROGRESS NOTE ADULT - ATTENDING COMMENTS
Dr. Philippe (Attending Physician)  N - Multimodal pain control on oxycodone, tylenol  P - saddle PE remains on hep gtt  C - amiodarone for polymorphic VT arrest, restarted home metoprolol  GI - regular diet started, repeat CT chest, abd. pelvis   - ESRD s/p explanted kidney, last HD Sat planned for today for hyponatremia but patient refusing will likely dialyze tomorrow  H - hep gtt for PE's and DVTs, daily PTTs resp status improved, plt count normal  ID - c/w abx regimen    dc triple lumen today

## 2020-01-20 NOTE — PROGRESS NOTE ADULT - ASSESSMENT
49M PMHx of ESRD on HD  ( via LUE AVF), HTN, Gout, Glaucoma, NET of pancreas, RA with hand contractures. s/p DDRT 12/8/19,  Post op course c/b DGF requiring HD. Renal bx x 2 c/w profound ATN.     Re-admitted with Influenza and perinephric hematoma. s/p hematoma evacuation/ abd washout and renal biopsy. Post op course c/b leukocytosis, infected hematoma, acute RLE DVT, and bleeding pseudoaneurysm at txp renal artery anastomosis. Now s/p graft nephrectomy. Course further complicated by saddle PE causing right heart strain and hemodynamic instability leading to cardiac arrest requiring ACLS, now with ROSC and intubated    [] DDRT c/b DGF/ATN/perinephric infected hematoma/pseudoneurysm/graft nephrectomy   - HD per nephrology team  - s/p bedside IR drainage of RLQ fluid collection (1/15), cxs with NGTD; repeat US showed decreased size; planning for CT scan on Tuesday   - off immunosupression  - off  valcyte/bactrim  - leukocytosis improving: ID following;  cont meropenem, amakacin q48 1/17, 1/19, vanco 500mg Q M/W/F, on fluc .    - pain control  - bowel regimen  - PT/OT/OOB  - Keep B/L LE bandaged with ace wrap  - Recommend to DC R radial A-Line    [] Saddle PE, with right heart strain and complicated by PEA arrest followed by Torsade, ROSC achieved after ACLS  - vascular, CT and vascular cardiology following   - continue anticoagulation and no plan for invasive intervention   - repeat Echo reviewed     [] New acute DVT of R distal brachia vein   - continue anticoagulation     [] New acute DVT of R external iliac, common femoral, femoral veins, R soleal vein requiring heparin drip  - IVC filter placed 1/9, on heparin drip     [] Dispo  - continue SICU care

## 2020-01-20 NOTE — PROGRESS NOTE ADULT - SUBJECTIVE AND OBJECTIVE BOX
Patient is a 49y old  Male who presents with a chief complaint of Fever (20 Jan 2020 10:30)    Being followed by ID for        Interval history:  pt feeling a little better    No major events overnight. Hemodynamically stable. Afebrile.   RUE ultrasound showed DVT in the brachial vein. No thrombus in the subclavian or axillary veins.   Abdominal US showed decreasing size of collection ~ 5x1.7cm.     Feels depressed about being in the hospital and away from his family. Also uspet about the DVT in RUE.   No complaint of pain or shortness of breath.   was able to walk witha walker and physical therapy  No other acute events          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  Carpal tunnel syndrome  Brain cyst: had MRI recently- now gone  Anemia  Gastric ulcer: Long time ago  Rheumatoid arthritis  Renal transplant recipient  Breakdown (mechanical) of penile (implanted) prosthesis, sequela  End-stage renal disease (ESRD): PERMACATH PLACEMENT  Abscess of left buttock: s/p I &amp; D  AV fistula: placement left lower arm  S/P cystoscopy: stent placement &amp; removal for kidney stones, lithiotripsy  s/p Lt Carpal tunnel: 2011    Allergies    coconut (Anaphylaxis)  morphine (Pruritus; Rash)    Intolerances      Antimicrobials:    fluconAZOLE IVPB 200 milliGRAM(s) IV Intermittent every 24 hours  meropenem  IVPB 500 milliGRAM(s) IV Intermittent every 24 hours  vancomycin  IVPB 500 milliGRAM(s) IV Intermittent <User Schedule>    MEDICATIONS  (STANDING):  allopurinol 100 milliGRAM(s) Oral daily  aMIOdarone    Tablet 200 milliGRAM(s) Oral two times a day  brimonidine 0.2% Ophthalmic Solution 1 Drop(s) Both EYES three times a day  chlorhexidine 2% Cloths 1 Application(s) Topical <User Schedule>  dextrose 5% + sodium chloride 0.9%. 1000 milliLiter(s) (10 mL/Hr) IV Continuous <Continuous>  fluconAZOLE IVPB 200 milliGRAM(s) IV Intermittent every 24 hours  gabapentin 100 milliGRAM(s) Oral every 12 hours  heparin  Infusion 1000 Unit(s)/Hr (14 mL/Hr) IV Continuous <Continuous>  latanoprost 0.005% Ophthalmic Solution 1 Drop(s) Both EYES daily  meropenem  IVPB 500 milliGRAM(s) IV Intermittent every 24 hours  metoprolol tartrate 25 milliGRAM(s) Oral every 12 hours  Nephro-candace 1 Tablet(s) Oral daily  vancomycin  IVPB 500 milliGRAM(s) IV Intermittent <User Schedule>      Vital Signs Last 24 Hrs  T(C): 37 (01-20-20 @ 07:00), Max: 37.4 (01-19-20 @ 23:00)  T(F): 98.6 (01-20-20 @ 07:00), Max: 99.3 (01-19-20 @ 23:00)  HR: 61 (01-20-20 @ 11:17) (58 - 81)  BP: --  BP(mean): --  RR: 24 (01-20-20 @ 10:00) (10 - 28)  SpO2: 97% (01-20-20 @ 11:17) (82% - 100%)    Physical Exam:    Constitutional sitting in chair    HEENT PERRLA EOMI,No pallor or icterus    No oral exudate or erythema    Neck supple no JVD or LN    Chest Good AE,CTA    CVS RRR S1 S2     Abd soft BS normal No tenderness    Ext RUE edema    IV site no erythema tenderness or discharge    Joints no swelling or LOM    CNS AAO X 3 no focal    Lab Data:                          8.2    16.42 )-----------( 439      ( 20 Jan 2020 00:40 )             25.6       01-20    130<L>  |  95<L>  |  27<H>  ----------------------------<  110<H>  3.8   |  23  |  6.37<H>    Ca    8.3<L>      20 Jan 2020 00:40  Phos  4.0     01-20  Mg     2.0     01-20    TPro  5.5<L>  /  Alb  2.2<L>  /  TBili  1.3<H>  /  DBili  0.8<H>  /  AST  11  /  ALT  <5<L>  /  AlkPhos  234<H>  01-20          .Sputum Sputum, trap  01-18-20   Normal Respiratory Gillian present  --    No polymorphonuclear leukocytes per low power field  Rare Squamous epithelial cells per low power field  Rare Gram positive cocci in pairs per oil power field      .Blood Blood  01-16-20   No growth to date.  --  --      .Body Fluid Abdominal Fluid  01-15-20   No growth  --    polymorphonuclear leukocytes seen  No organisms seen  by cytocentrifuge      < from: VA Duplex Upper Ext Vein Scan, Right (01.19.20 @ 14:34) >    IMPRESSION:     Deep vein thrombosis in the mid to distal brachial vein.    Thrombosis of the cephalic vein, a superficial vessel.    These findings were discussed with MAEVE Nichols by the ultrasound technologist at the time of the examination.      < end of copied text >          < from: US Abdomen Limited (01.19.20 @ 20:13) >  IMPRESSION:    Interval decrease in size of right lower quadrant fluid collection.    < end of copied text >        WBC Count: 16.42 (01-20-20 @ 00:40)  WBC Count: 16.77 (01-19-20 @ 01:39)  WBC Count: 27.27 (01-18-20 @ 01:32)  WBC Count: 31.51 (01-17-20 @ 12:58)  WBC Count: 29.14 (01-17-20 @ 03:48)  WBC Count: 33.58 (01-16-20 @ 21:22)  WBC Count: 33.21 (01-16-20 @ 13:49)  WBC Count: 31.20 (01-16-20 @ 07:55)  WBC Count: 27.07 (01-16-20 @ 00:39)      < from: 12 Lead ECG (01.16.20 @ 03:38) >    Ventricular Rate 86 BPM    Atrial Rate 86 BPM    QRS Duration 84 ms    Q-T Interval 394 ms    QTC Calculation(Bezet) 471 ms    R Axis 72 degrees    T Axis 34 degrees    Diagnosis Line SINUS RHYTHM  NONSPECIFIC T WAVE ABNORMALITY  ABNORMAL ECG    Confirmed by MD GLORIA, EDILBERTO ([id number]) on 1/16/2020 10:12:46 PM    < end of copied text >

## 2020-01-21 LAB
ALBUMIN SERPL ELPH-MCNC: 2 G/DL — LOW (ref 3.3–5)
ALP SERPL-CCNC: 238 U/L — HIGH (ref 40–120)
ALT FLD-CCNC: 6 U/L — LOW (ref 10–45)
AMIKACIN TROUGH SERPL-MCNC: 8.1 UG/ML — CRITICAL HIGH
ANION GAP SERPL CALC-SCNC: 15 MMOL/L — SIGNIFICANT CHANGE UP (ref 5–17)
APTT BLD: 70.3 SEC — HIGH (ref 27.5–36.3)
AST SERPL-CCNC: 13 U/L — SIGNIFICANT CHANGE UP (ref 10–40)
BILIRUB DIRECT SERPL-MCNC: 0.7 MG/DL — HIGH (ref 0–0.2)
BILIRUB INDIRECT FLD-MCNC: 0.5 MG/DL — SIGNIFICANT CHANGE UP (ref 0.2–1)
BILIRUB SERPL-MCNC: 1.2 MG/DL — SIGNIFICANT CHANGE UP (ref 0.2–1.2)
BLD GP AB SCN SERPL QL: NEGATIVE — SIGNIFICANT CHANGE UP
BUN SERPL-MCNC: 31 MG/DL — HIGH (ref 7–23)
CALCIUM SERPL-MCNC: 8.3 MG/DL — LOW (ref 8.4–10.5)
CHLORIDE SERPL-SCNC: 94 MMOL/L — LOW (ref 96–108)
CO2 SERPL-SCNC: 22 MMOL/L — SIGNIFICANT CHANGE UP (ref 22–31)
CREAT SERPL-MCNC: 7.26 MG/DL — HIGH (ref 0.5–1.3)
CULTURE RESULTS: SIGNIFICANT CHANGE UP
CULTURE RESULTS: SIGNIFICANT CHANGE UP
GLUCOSE SERPL-MCNC: 100 MG/DL — HIGH (ref 70–99)
HCT VFR BLD CALC: 25.8 % — LOW (ref 39–50)
HGB BLD-MCNC: 8.1 G/DL — LOW (ref 13–17)
INR BLD: 1.37 RATIO — HIGH (ref 0.88–1.16)
MAGNESIUM SERPL-MCNC: 2 MG/DL — SIGNIFICANT CHANGE UP (ref 1.6–2.6)
MCHC RBC-ENTMCNC: 29.3 PG — SIGNIFICANT CHANGE UP (ref 27–34)
MCHC RBC-ENTMCNC: 31.4 GM/DL — LOW (ref 32–36)
MCV RBC AUTO: 93.5 FL — SIGNIFICANT CHANGE UP (ref 80–100)
NRBC # BLD: 0 /100 WBCS — SIGNIFICANT CHANGE UP (ref 0–0)
PHOSPHATE SERPL-MCNC: 4.1 MG/DL — SIGNIFICANT CHANGE UP (ref 2.5–4.5)
PLATELET # BLD AUTO: 470 K/UL — HIGH (ref 150–400)
POTASSIUM SERPL-MCNC: 4.1 MMOL/L — SIGNIFICANT CHANGE UP (ref 3.5–5.3)
POTASSIUM SERPL-SCNC: 4.1 MMOL/L — SIGNIFICANT CHANGE UP (ref 3.5–5.3)
PROCALCITONIN SERPL-MCNC: 3.63 NG/ML — HIGH (ref 0.02–0.1)
PROT SERPL-MCNC: 5.3 G/DL — LOW (ref 6–8.3)
PROTHROM AB SERPL-ACNC: 15.9 SEC — HIGH (ref 10–12.9)
RBC # BLD: 2.76 M/UL — LOW (ref 4.2–5.8)
RBC # FLD: 17.4 % — HIGH (ref 10.3–14.5)
RH IG SCN BLD-IMP: POSITIVE — SIGNIFICANT CHANGE UP
SODIUM SERPL-SCNC: 131 MMOL/L — LOW (ref 135–145)
SPECIMEN SOURCE: SIGNIFICANT CHANGE UP
SPECIMEN SOURCE: SIGNIFICANT CHANGE UP
VANCOMYCIN TROUGH SERPL-MCNC: 10.6 UG/ML — SIGNIFICANT CHANGE UP (ref 10–20)
WBC # BLD: 17.07 K/UL — HIGH (ref 3.8–10.5)
WBC # FLD AUTO: 17.07 K/UL — HIGH (ref 3.8–10.5)

## 2020-01-21 PROCEDURE — 99233 SBSQ HOSP IP/OBS HIGH 50: CPT | Mod: GC

## 2020-01-21 PROCEDURE — 71045 X-RAY EXAM CHEST 1 VIEW: CPT | Mod: 26

## 2020-01-21 PROCEDURE — 99233 SBSQ HOSP IP/OBS HIGH 50: CPT

## 2020-01-21 PROCEDURE — 99232 SBSQ HOSP IP/OBS MODERATE 35: CPT | Mod: GC

## 2020-01-21 PROCEDURE — 99232 SBSQ HOSP IP/OBS MODERATE 35: CPT

## 2020-01-21 PROCEDURE — 71275 CT ANGIOGRAPHY CHEST: CPT | Mod: 26

## 2020-01-21 PROCEDURE — 93010 ELECTROCARDIOGRAM REPORT: CPT

## 2020-01-21 PROCEDURE — 74177 CT ABD & PELVIS W/CONTRAST: CPT | Mod: 26

## 2020-01-21 RX ADMIN — BRIMONIDINE TARTRATE 1 DROP(S): 2 SOLUTION/ DROPS OPHTHALMIC at 05:27

## 2020-01-21 RX ADMIN — Medication 975 MILLIGRAM(S): at 21:20

## 2020-01-21 RX ADMIN — MICAFUNGIN SODIUM 105 MILLIGRAM(S): 100 INJECTION, POWDER, LYOPHILIZED, FOR SOLUTION INTRAVENOUS at 16:00

## 2020-01-21 RX ADMIN — HYDROMORPHONE HYDROCHLORIDE 0.5 MILLIGRAM(S): 2 INJECTION INTRAMUSCULAR; INTRAVENOUS; SUBCUTANEOUS at 05:49

## 2020-01-21 RX ADMIN — GABAPENTIN 100 MILLIGRAM(S): 400 CAPSULE ORAL at 17:28

## 2020-01-21 RX ADMIN — OXYCODONE HYDROCHLORIDE 5 MILLIGRAM(S): 5 TABLET ORAL at 01:47

## 2020-01-21 RX ADMIN — LATANOPROST 1 DROP(S): 0.05 SOLUTION/ DROPS OPHTHALMIC; TOPICAL at 12:03

## 2020-01-21 RX ADMIN — OXYCODONE HYDROCHLORIDE 5 MILLIGRAM(S): 5 TABLET ORAL at 01:17

## 2020-01-21 RX ADMIN — AMIODARONE HYDROCHLORIDE 200 MILLIGRAM(S): 400 TABLET ORAL at 05:27

## 2020-01-21 RX ADMIN — HEPARIN SODIUM 14 UNIT(S)/HR: 5000 INJECTION INTRAVENOUS; SUBCUTANEOUS at 22:18

## 2020-01-21 RX ADMIN — HYDROMORPHONE HYDROCHLORIDE 0.5 MILLIGRAM(S): 2 INJECTION INTRAMUSCULAR; INTRAVENOUS; SUBCUTANEOUS at 11:00

## 2020-01-21 RX ADMIN — BRIMONIDINE TARTRATE 1 DROP(S): 2 SOLUTION/ DROPS OPHTHALMIC at 22:10

## 2020-01-21 RX ADMIN — Medication 975 MILLIGRAM(S): at 20:50

## 2020-01-21 RX ADMIN — MEROPENEM 100 MILLIGRAM(S): 1 INJECTION INTRAVENOUS at 22:09

## 2020-01-21 RX ADMIN — OXYCODONE HYDROCHLORIDE 5 MILLIGRAM(S): 5 TABLET ORAL at 18:10

## 2020-01-21 RX ADMIN — AMIODARONE HYDROCHLORIDE 200 MILLIGRAM(S): 400 TABLET ORAL at 17:28

## 2020-01-21 RX ADMIN — Medication 100 MILLIGRAM(S): at 12:02

## 2020-01-21 RX ADMIN — GABAPENTIN 100 MILLIGRAM(S): 400 CAPSULE ORAL at 05:27

## 2020-01-21 RX ADMIN — OXYCODONE HYDROCHLORIDE 5 MILLIGRAM(S): 5 TABLET ORAL at 21:28

## 2020-01-21 RX ADMIN — OXYCODONE HYDROCHLORIDE 5 MILLIGRAM(S): 5 TABLET ORAL at 17:28

## 2020-01-21 RX ADMIN — BRIMONIDINE TARTRATE 1 DROP(S): 2 SOLUTION/ DROPS OPHTHALMIC at 15:54

## 2020-01-21 RX ADMIN — CHLORHEXIDINE GLUCONATE 1 APPLICATION(S): 213 SOLUTION TOPICAL at 05:27

## 2020-01-21 RX ADMIN — OXYCODONE HYDROCHLORIDE 5 MILLIGRAM(S): 5 TABLET ORAL at 21:58

## 2020-01-21 RX ADMIN — HYDROMORPHONE HYDROCHLORIDE 0.5 MILLIGRAM(S): 2 INJECTION INTRAMUSCULAR; INTRAVENOUS; SUBCUTANEOUS at 11:25

## 2020-01-21 RX ADMIN — Medication 1 TABLET(S): at 12:02

## 2020-01-21 RX ADMIN — HYDROMORPHONE HYDROCHLORIDE 0.5 MILLIGRAM(S): 2 INJECTION INTRAMUSCULAR; INTRAVENOUS; SUBCUTANEOUS at 05:34

## 2020-01-21 NOTE — PROGRESS NOTE ADULT - ASSESSMENT
Assessment  49y Male with ESRD s/p DDRT on 12/8/19 with course complicated by delayed graft function requiring HD, and infected perinephric hematoma s/p washout on 1/1/2020 with worsening sepsis and transfer to ICU, s/p IR drainage of perinephric collection on 1/6. 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 He underwent operative repair of right external iliac artery with bovine patch, transplant nephrectomy, RLE thrombectomy, and IVC filter on 1/10. Post-op course complicated by cardiac arrest secondary to saddle PE.    Plan  - Cont leg elevation   - CT C/A/P pending today  - Continue excellent SICU care     ROSLYN Freitas, PGY2  Vascular Surgery   [id number] with any questions

## 2020-01-21 NOTE — PROGRESS NOTE ADULT - SUBJECTIVE AND OBJECTIVE BOX
Follow Up:      Interval History:    REVIEW OF SYSTEMS  [  ] ROS unobtainable because:    [  ] All other systems negative except as noted below    Constitutional:  [ ] fever [ ] chills  [ ] weight loss  [ ] weakness  Skin:  [ ] rash [ ] phlebitis	  Eyes: [ ] icterus [ ] pain  [ ] discharge	  ENMT: [ ] sore throat  [ ] thrush [ ] ulcers [ ] exudates  Respiratory: [ ] dyspnea [ ] hemoptysis [ ] cough [ ] sputum	  Cardiovascular:  [ ] chest pain [ ] palpitations [ ] edema	  Gastrointestinal:  [ ] nausea [ ] vomiting [ ] diarrhea [ ] constipation [ ] pain	  Genitourinary:  [ ] dysuria [ ] frequency [ ] hematuria [ ] discharge [ ] flank pain  [ ] incontinence  Musculoskeletal:  [ ] myalgias [ ] arthralgias [ ] arthritis  [ ] back pain  Neurological:  [ ] headache [ ] seizures  [ ] confusion/altered mental status    Allergies  coconut (Anaphylaxis)  morphine (Pruritus; Rash)        ANTIMICROBIALS:  meropenem  IVPB 500 every 24 hours  micafungin IVPB 100 every 24 hours  vancomycin  IVPB 500 <User Schedule>      OTHER MEDS:  MEDICATIONS  (STANDING):  acetaminophen   Tablet .. 975 every 6 hours PRN  allopurinol 100 daily  aMIOdarone    Tablet 200 two times a day  gabapentin 100 every 12 hours  heparin  Infusion 1000 <Continuous>  HYDROmorphone  Injectable 0.5 every 3 hours PRN  oxyCODONE    IR 5 every 4 hours PRN      Vital Signs Last 24 Hrs  T(C): 37.8 (21 Jan 2020 16:00), Max: 38.2 (20 Jan 2020 19:00)  T(F): 100 (21 Jan 2020 16:00), Max: 100.8 (20 Jan 2020 19:00)  HR: 74 (21 Jan 2020 16:00) (58 - 74)  BP: 107/53 (21 Jan 2020 16:00) (89/59 - 143/72)  BP(mean): 77 (21 Jan 2020 16:00) (67 - 98)  RR: 27 (21 Jan 2020 16:00) (11 - 33)  SpO2: 99% (21 Jan 2020 16:00) (95% - 100%)    PHYSICAL EXAMINATION:  General: Alert and Awake, NAD  HEENT: PERRL, EOMI  Neck: Supple  Cardiac: RRR, No M/R/G  Resp: CTAB, No Wh/Rh/Ra  Abdomen: +RLQ drain with serosanguinous drainage. +RLQ wound vac over transplant nephrectomy site, NBS, mild tenderness to palpation over RLQ  MSK: 1-2+ RLE edema. No Calf tenderness  Skin: No rashes or lesions. Skin is warm and dry to the touch.   Neuro: Alert and Awake. CN 2-12 Grossly intact. Moves all four extremities spontaneously.  Psych: Calm, Pleasant, Cooperative                          8.1    17.07 )-----------( 470      ( 21 Jan 2020 00:26 )             25.8       01-21    131<L>  |  94<L>  |  31<H>  ----------------------------<  100<H>  4.1   |  22  |  7.26<H>    Ca    8.3<L>      21 Jan 2020 00:26  Phos  4.1     01-21  Mg     2.0     01-21    TPro  5.3<L>  /  Alb  2.0<L>  /  TBili  1.2  /  DBili  0.7<H>  /  AST  13  /  ALT  6<L>  /  AlkPhos  238<H>  01-21          MICROBIOLOGY:  v  .Sputum Sputum, trap  01-18-20   Normal Respiratory Gillian present  --    No polymorphonuclear leukocytes per low power field  Rare Squamous epithelial cells per low power field  Rare Gram positive cocci in pairs per oil power field      .Blood Blood  01-16-20   No growth at 5 days.  --  --      .Body Fluid Abdominal Fluid  01-15-20   No growth at 5 days  --    polymorphonuclear leukocytes seen  No organisms seen  by cytocentrifuge      .Tissue Other  01-10-20   No growth at 5 days  --  Enterobacter cloacae (Carbapenem Resistant)      .Blood Blood-Venous  01-07-20   No growth at 5 days.  --  --      .Urine Catheterized  01-07-20   <10,000 CFU/mL Normal Urogenital Gillian  --  --      Abdominal Fl Abdominal Fluid  01-06-20   No growth at 5 days  --    No polymorphonuclear leukocytes seen  No organisms seen  by cytocentrifuge      .Urine Clean Catch (Midstream)  01-05-20   >100,000 CFU/ml Enterobacter cloacae  --  Enterobacter cloacae      .Blood Blood  01-04-20   No growth at 5 days.  --  --      .Tissue Other  01-01-20   Numerous Enterobacter cloacae complex  --  Enterobacter cloacae complex      .Surgical Swab collection around right kidney  01-01-20   Moderate Enterobacter cloacae complex  --  Enterobacter cloacae complex      .Body Fluid periphrenic collection  01-01-20   **Please Note**: This is a Corrected Report**  Moderate Enterobacter cloacae complex  Moderate Escherichia coli  Previously reported as:  Moderate Enterobacter cloacae (Carbapenem Resistant)  Moderate Escherichia coli  --  Escherichia coli  Enterobacter cloacae complex      .Surgical Swab right kidney abscess  01-01-20   Moderate Enterobacter cloacae complex  --  Enterobacter cloacae complex      .Urine Clean Catch (Midstream)  12-29-19   >100,000 CFU/ml Enterobacter cloacae  --  Enterobacter cloacae      .Blood Blood-Peripheral  12-27-19   No growth at 5 days.  --  --      RADIOLOGY:    <The imaging below has been reviewed and visualized me independently>    EXAM:  US ABDOMEN LIMITED                        PROCEDURE DATE:  01/19/2020    Interval decrease in size of right lower quadrant fluid collection.    EXAM:  XR CHEST PORTABLE ROUTINE 1V                        PROCEDURE DATE:  01/21/2020    Unchanged small bilateral pleural effusions with associated atelectasis. Follow Up:  infected hematoma    Interval History: low grade fever overnight. continued mild pain in RLQ around wound vac. no diarrhea     REVIEW OF SYSTEMS  [  ] ROS unobtainable because:    [x  ] All other systems negative except as noted below    Constitutional:  [ x] fever [ ] chills  [ ] weight loss  [ ] weakness  Skin:  [ ] rash [ ] phlebitis	  Eyes: [ ] icterus [ ] pain  [ ] discharge	  ENMT: [ ] sore throat  [ ] thrush [ ] ulcers [ ] exudates  Respiratory: [ ] dyspnea [ ] hemoptysis [ ] cough [ ] sputum	  Cardiovascular:  [ ] chest pain [ ] palpitations [ ] edema	  Gastrointestinal:  [ ] nausea [ ] vomiting [ ] diarrhea [ ] constipation [ x] pain	  Genitourinary:  [ ] dysuria [ ] frequency [ ] hematuria [ ] discharge [ ] flank pain  [ ] incontinence  Musculoskeletal:  [ ] myalgias [ ] arthralgias [ ] arthritis  [ ] back pain  Neurological:  [ ] headache [ ] seizures  [ ] confusion/altered mental status    Allergies  coconut (Anaphylaxis)  morphine (Pruritus; Rash)        ANTIMICROBIALS:  meropenem  IVPB 500 every 24 hours  micafungin IVPB 100 every 24 hours  vancomycin  IVPB 500 <User Schedule>      OTHER MEDS:  MEDICATIONS  (STANDING):  acetaminophen   Tablet .. 975 every 6 hours PRN  allopurinol 100 daily  aMIOdarone    Tablet 200 two times a day  gabapentin 100 every 12 hours  heparin  Infusion 1000 <Continuous>  HYDROmorphone  Injectable 0.5 every 3 hours PRN  oxyCODONE    IR 5 every 4 hours PRN      Vital Signs Last 24 Hrs  T(C): 37.8 (21 Jan 2020 16:00), Max: 38.2 (20 Jan 2020 19:00)  T(F): 100 (21 Jan 2020 16:00), Max: 100.8 (20 Jan 2020 19:00)  HR: 74 (21 Jan 2020 16:00) (58 - 74)  BP: 107/53 (21 Jan 2020 16:00) (89/59 - 143/72)  BP(mean): 77 (21 Jan 2020 16:00) (67 - 98)  RR: 27 (21 Jan 2020 16:00) (11 - 33)  SpO2: 99% (21 Jan 2020 16:00) (95% - 100%)    PHYSICAL EXAMINATION:  General: Alert and Awake, NAD  HEENT: PERRL, EOMI  Neck: Supple  Cardiac: RRR, No M/R/G  Resp: CTAB, No Wh/Rh/Ra  Abdomen: +RLQ drain with serosanguinous drainage. +RLQ wound vac over transplant nephrectomy site, NBS, mild tenderness to palpation over RLQ  MSK: 1-2+ RLE edema. No Calf tenderness  Skin: No rashes or lesions. Skin is warm and dry to the touch.   Neuro: Alert and Awake. CN 2-12 Grossly intact. Moves all four extremities spontaneously.  Psych: Calm, Pleasant, Cooperative                          8.1    17.07 )-----------( 470      ( 21 Jan 2020 00:26 )             25.8       01-21    131<L>  |  94<L>  |  31<H>  ----------------------------<  100<H>  4.1   |  22  |  7.26<H>    Ca    8.3<L>      21 Jan 2020 00:26  Phos  4.1     01-21  Mg     2.0     01-21    TPro  5.3<L>  /  Alb  2.0<L>  /  TBili  1.2  /  DBili  0.7<H>  /  AST  13  /  ALT  6<L>  /  AlkPhos  238<H>  01-21          MICROBIOLOGY:  v  .Sputum Sputum, trap  01-18-20   Normal Respiratory Gillian present  --    No polymorphonuclear leukocytes per low power field  Rare Squamous epithelial cells per low power field  Rare Gram positive cocci in pairs per oil power field      .Blood Blood  01-16-20   No growth at 5 days.  --  --      .Body Fluid Abdominal Fluid  01-15-20   No growth at 5 days  --    polymorphonuclear leukocytes seen  No organisms seen  by cytocentrifuge      .Tissue Other  01-10-20   No growth at 5 days  --  Enterobacter cloacae (Carbapenem Resistant)      .Blood Blood-Venous  01-07-20   No growth at 5 days.  --  --      .Urine Catheterized  01-07-20   <10,000 CFU/mL Normal Urogenital Gillian  --  --      Abdominal Fl Abdominal Fluid  01-06-20   No growth at 5 days  --    No polymorphonuclear leukocytes seen  No organisms seen  by cytocentrifuge      .Urine Clean Catch (Midstream)  01-05-20   >100,000 CFU/ml Enterobacter cloacae  --  Enterobacter cloacae      .Blood Blood  01-04-20   No growth at 5 days.  --  --      .Tissue Other  01-01-20   Numerous Enterobacter cloacae complex  --  Enterobacter cloacae complex      .Surgical Swab collection around right kidney  01-01-20   Moderate Enterobacter cloacae complex  --  Enterobacter cloacae complex      .Body Fluid periphrenic collection  01-01-20   **Please Note**: This is a Corrected Report**  Moderate Enterobacter cloacae complex  Moderate Escherichia coli  Previously reported as:  Moderate Enterobacter cloacae (Carbapenem Resistant)  Moderate Escherichia coli  --  Escherichia coli  Enterobacter cloacae complex      .Surgical Swab right kidney abscess  01-01-20   Moderate Enterobacter cloacae complex  --  Enterobacter cloacae complex      .Urine Clean Catch (Midstream)  12-29-19   >100,000 CFU/ml Enterobacter cloacae  --  Enterobacter cloacae      .Blood Blood-Peripheral  12-27-19   No growth at 5 days.  --  --      RADIOLOGY:    <The imaging below has been reviewed and visualized me independently>    EXAM:  US ABDOMEN LIMITED                        PROCEDURE DATE:  01/19/2020    Interval decrease in size of right lower quadrant fluid collection.    EXAM:  XR CHEST PORTABLE ROUTINE 1V                        PROCEDURE DATE:  01/21/2020    Unchanged small bilateral pleural effusions with associated atelectasis.

## 2020-01-21 NOTE — PROGRESS NOTE ADULT - PROBLEM SELECTOR PLAN 1
Volume overload improving. Electrolyte balance is acceptable (mild hyponatremia likely 2/2 volume overload). Refused HD treatment yesterday. Plan for HD treatment today. Monitor labs

## 2020-01-21 NOTE — OCCUPATIONAL THERAPY INITIAL EVALUATION ADULT - ADL RETRAINING, OT EVAL
1: complete LBD independently with AE as needed within 4 weeks. 2: complete grooming standing at sink independently within 4 weeks

## 2020-01-21 NOTE — PROGRESS NOTE ADULT - PROBLEM SELECTOR PLAN 4
Leukocytosis improving, remains at 17 today. Afebrile.   Continue Meropenem, Amikacin , Diflucan and Vanco.   CT CAP today to evaluate for abscess collection.   ID follow up

## 2020-01-21 NOTE — PROGRESS NOTE ADULT - ASSESSMENT
49M PMHx of ESRD on HD  ( via LUE AVF), HTN, Gout, Glaucoma, NET of pancreas, RA with hand contractures. s/p DDRT 12/8/19,  Post op course c/b DGF requiring HD. Renal bx x 2 c/w profound ATN.     Re-admitted with Influenza and perinephric hematoma. s/p hematoma evacuation/ abd washout and renal biopsy. Post op course c/b leukocytosis, infected hematoma, acute RLE DVT, and bleeding pseudoaneurysm at txp renal artery anastomosis. Now s/p graft nephrectomy. Course further complicated by saddle PE causing right heart strain and hemodynamic instability leading to cardiac arrest requiring ACLS, now with ROSC and intubated    [] DDRT c/b DGF/ATN/perinephric infected hematoma/pseudoneurysm/graft nephrectomy   - HD per nephrology team  - s/p bedside IR drainage of RLQ fluid collection (1/15), cxs with NGTD; repeat US showed decreased size; CT scan today  - off immunosupression  - off  valcyte/bactrim  - leukocytosis improving: ID following;  cont meropenem, amakacin q48 1/17, 1/19, vanco 500mg Q M/W/F, switched to micafungin    - pain control  - bowel regimen  - PT/OT/OOB  - Keep B/L LE bandaged with ace wrap  - Recommend to DC R radial A-Line    [] Saddle PE, with right heart strain and complicated by PEA arrest followed by Torsade, ROSC achieved after ACLS  - vascular, CT and vascular cardiology following   - continue anticoagulation and no plan for invasive intervention; pending CT scan, may consider switching to NOAC in the future   - repeat Echo reviewed     [] New acute DVT of R distal brachia vein   - continue anticoagulation     [] New acute DVT of R external iliac, common femoral, femoral veins, R soleal vein requiring heparin drip  - IVC filter placed 1/9, on heparin drip     [] Dispo  - continue SICU care

## 2020-01-21 NOTE — PROGRESS NOTE ADULT - SUBJECTIVE AND OBJECTIVE BOX
Seen in SICU, resting, fever noted    Vital Signs Last 24 Hrs  T(C): 37.3 (01-21-20 @ 11:00), Max: 38.2 (01-20-20 @ 19:00)  T(F): 99.1 (01-21-20 @ 11:00), Max: 100.8 (01-20-20 @ 19:00)  HR: 69 (01-21-20 @ 12:00) (58 - 69)  BP: 126/66 (01-21-20 @ 12:00) (89/57 - 143/72)  BP(mean): 91 (01-21-20 @ 12:00) (67 - 98)  RR: 12 (01-21-20 @ 12:00) (11 - 26)  SpO2: 96% (01-21-20 @ 12:00) (95% - 100%)    Card S1S2  Lungs decr BS b/l  Abd soft  Extr + edema b/l LE R > L, + edema RUE, AVF patent                                                                                                                                             8.1    17.07 )-----------( 470      ( 21 Jan 2020 00:26 )             25.8     21 Jan 2020 00:26    131    |  94     |  31     ----------------------------<  100    4.1     |  22     |  7.26     Ca    8.3        21 Jan 2020 00:26  Phos  4.1       21 Jan 2020 00:26  Mg     2.0       21 Jan 2020 00:26    TPro  5.3    /  Alb  2.0    /  TBili  1.2    /  DBili  0.7    /  AST  13     /  ALT  6      /  AlkPhos  238    21 Jan 2020 00:26    LIVER FUNCTIONS - ( 21 Jan 2020 00:26 )  Alb: 2.0 g/dL / Pro: 5.3 g/dL / ALK PHOS: 238 U/L / ALT: 6 U/L / AST: 13 U/L / GGT: x           PT/INR - ( 21 Jan 2020 00:26 )   PT: 15.9 sec;   INR: 1.37 ratio      acetaminophen   Tablet .. 975 milliGRAM(s) Oral every 6 hours PRN  allopurinol 100 milliGRAM(s) Oral daily  aMIOdarone    Tablet 200 milliGRAM(s) Oral two times a day  brimonidine 0.2% Ophthalmic Solution 1 Drop(s) Both EYES three times a day  chlorhexidine 2% Cloths 1 Application(s) Topical <User Schedule>  gabapentin 100 milliGRAM(s) Oral every 12 hours  heparin  Infusion 1000 Unit(s)/Hr IV Continuous <Continuous>  HYDROmorphone  Injectable 0.5 milliGRAM(s) IV Push every 3 hours PRN  latanoprost 0.005% Ophthalmic Solution 1 Drop(s) Both EYES daily  lidocaine 2% Jelly 1 milliLiter(s) IntraUrethral two times a day PRN  meropenem  IVPB 500 milliGRAM(s) IV Intermittent every 24 hours  micafungin IVPB 100 milliGRAM(s) IV Intermittent every 24 hours  Nephro-candace 1 Tablet(s) Oral daily  oxyCODONE    IR 5 milliGRAM(s) Oral every 4 hours PRN  vancomycin  IVPB 500 milliGRAM(s) IV Intermittent <User Schedule>    A/P:    Adm w/Flu A 12/27/19  Hx ESRD on HD  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, DGF  S/p transplant kidney bx 12/13: ATN, focal TMA  S/p PLEX 12/13, 12/15; d/c-d 12/20 w/op HD 3 x wk  Re-adm 12/27, dx w/infected carlos-nephric hematoma   S/p OR 1/1 for washout and repeat renal graft biopsy  Repeat renal bx c/w ATN  Dx w/RLE DVT  Developed gross hematuria  S/p OR 1/10 for infected carlos-nephric hematoma, hemorrhage involving pseudo-aneurysm of anastomosis of renal artery to external iliac vein,   s/p transplant nephrectomy, s/p IVCF   S/p PEA arrest 1/13, intubated, s/p CTA, + PE, R retrop collection, on AC  S/p IR drainage of abd collection  New RUE DVT  Fever today  Abx per ID  S/p CTA A/P   HD today  Fluid removal 2kg as tolerates

## 2020-01-21 NOTE — PROGRESS NOTE ADULT - SUBJECTIVE AND OBJECTIVE BOX
[facility] DIVISION OF KIDNEY DISEASES AND HYPERTENSION -- FOLLOW UP NOTE  --------------------------------------------------------------------------------  HPI: 49 yr old man with ESRD s/p DDRT in Dec 2019 complicated by DGF due to ATN and early TMA thought to be due to prograf. Was on belatacept/MMF/Pred immunosuppression. He was admitted with influenza, course complicated by perinephric hematoma complicated by enterobacter infection. s/p surgical exploration and wash out. He developed DVT started on heparin drip, subsequent gross hematuria. He was taken to the OR and found to have dehiscence of the ureter-bladder anastomosis as well as vascular anastomosis. He underwent graft nephrectomy on 1/10/20 with bovine biopatch repair of the EIA. On 1/13/20 he had cardiac arrest due to large pulmonary embolism s/p successful resuscitation. S/p bedside drainage of RLQ collection on 1/15/20, 200cc removed and drain placed. RUE ultrasound showed DVT in the brachial vein. No thrombus in the subclavian or axillary veins.     Pt. seen and examined at bedside this AM. Pt. states that he has no new complaints.  PAST HISTORY  --------------------------------------------------------------------------------  No significant changes to PMH, PSH, FHx, SHx, unless otherwise noted    ALLERGIES & MEDICATIONS  --------------------------------------------------------------------------------  Allergies    coconut (Anaphylaxis)  morphine (Pruritus; Rash)    Intolerances    Standing Inpatient Medications  allopurinol 100 milliGRAM(s) Oral daily  aMIOdarone    Tablet 200 milliGRAM(s) Oral two times a day  brimonidine 0.2% Ophthalmic Solution 1 Drop(s) Both EYES three times a day  chlorhexidine 2% Cloths 1 Application(s) Topical <User Schedule>  gabapentin 100 milliGRAM(s) Oral every 12 hours  heparin  Infusion 1000 Unit(s)/Hr IV Continuous <Continuous>  latanoprost 0.005% Ophthalmic Solution 1 Drop(s) Both EYES daily  meropenem  IVPB 500 milliGRAM(s) IV Intermittent every 24 hours  micafungin IVPB 100 milliGRAM(s) IV Intermittent every 24 hours  Nephro-candace 1 Tablet(s) Oral daily  vancomycin  IVPB 500 milliGRAM(s) IV Intermittent <User Schedule>    REVIEW OF SYSTEMS  --------------------------------------------------------------------------------  Gen: + lethargy, + fatigue  Respiratory: No dyspnea  CV: No chest pain  GI: No abdominal pain  MSK: + LE edema  Neuro: No dizziness  Heme: No bleeding    All other systems were reviewed and are negative, except as noted.  VITALS/PHYSICAL EXAM  --------------------------------------------------------------------------------  T(C): 37.2 (01-21-20 @ 03:00), Max: 38.2 (01-20-20 @ 19:00)  HR: 62 (01-21-20 @ 06:00) (58 - 68)  BP: 133/63 (01-21-20 @ 06:00) (89/57 - 143/72)  RR: 16 (01-21-20 @ 06:00) (11 - 28)  SpO2: 97% (01-21-20 @ 06:00) (95% - 100%)  Wt(kg): --    01-19-20 @ 07:01  -  01-20-20 @ 07:00  --------------------------------------------------------  IN: 826 mL / OUT: 340 mL / NET: 486 mL    01-20-20 @ 07:01  -  01-21-20 @ 06:45  --------------------------------------------------------  IN: 1008 mL / OUT: 160 mL / NET: 848 mL    Physical Exam:  	Gen: NAD, resting  	HEENT: supple neck  	Pulm: decreased breath sounds bilaterally  	CV: RRR, S1S2; no rub  	Abd: +BS, soft, RLQ with drain in place  	: No suprapubic tenderness  	LE: b/l pitting edema, R>L (improved)  LABS/STUDIES  --------------------------------------------------------------------------------              8.1    17.07 >-----------<  470      [01-21-20 @ 00:26]              25.8     131  |  94  |  31  ----------------------------<  100      [01-21-20 @ 00:26]  4.1   |  22  |  7.26        Ca     8.3     [01-21-20 @ 00:26]      Mg     2.0     [01-21-20 @ 00:26]      Phos  4.1     [01-21-20 @ 00:26]    Creatinine Trend:  SCr 7.26 [01-21 @ 00:26]  SCr 6.37 [01-20 @ 00:40]  SCr 5.28 [01-19 @ 01:39]  SCr 4.31 [01-18 @ 01:36]  SCr 2.96 [01-17 @ 12:58]

## 2020-01-21 NOTE — PROGRESS NOTE ADULT - ATTENDING COMMENTS
49 yr old man with ESRD s/p DDRT in Dec 2019 with complicated post operative course, now s/p graft nephrectomy on 1/10/20 back on hemodialysis off all immunosuppression.  Also has massive PE/DVT on heparin drip.    1. ESRD on dialysis. HD today , managed by his primary nephrologist( Dr Amaya)  2. DVT/Saddle PE - on heparin drip  3. Bacteremia- Meropenem, Amikacin , Diflucan and Vanco. CT A/P today

## 2020-01-21 NOTE — PROGRESS NOTE ADULT - ASSESSMENT
50 y/o male presenting with influenza A with a hospital course complicated by:  - Infected transplant perinephric hematoma s/p right groin exploration, washout, & biopsy c/b persistent perinephric collection s/p FNA  - Right external iliac & common femoral DVTs s/p IVC filter & thrombectomy of the RLE veins  - Gross hematuria w/ pseudoaneurysm of the right external iliac & transplant renal artery seen on IR angiogram s/p right groin exploration, washout of the RP space, and transplant nephrectomy w/ repair of the right external iliac artery w/ a bovine patch  - Persistent right iliac fossa collection s/p IR drainage  - Cardiac arrest secondary to a saddle PE    PLAN:    Neuro: acute post-op pain  - Monitor mental status  - Pain control with acetaminophen, oxycodone, Dilaudid, and gabapentin    Resp: saddle PE  - Monitor pulse oximeter  - Out of bed to chair, ambulate as tolerated, and incentive spirometry to prevent atelectasis    CV: obstructive shock secondary to PE (resolved), s/p PEA arrest with progression to torsades  - Monitor vital signs  - Amiodarone for episode of torsades  - Currently holding home metoprolol 25 mg PO q12hrs for HTN, home hydralazine 50 mg PO BID.    GI: no acute issues  - Regular diet as tolerated  - Bowel regimen with senna & Miralax  - Monitor right iliac fossa IR drain output    Renal: CKD stage V s/p DDRT c/b DGF & perinephric collection s/p right groin exploration, washout, & biopsy c/b persistent perinephric collection s/p FNA; gross hematuria w/ pseudoaneurysm of the right external iliac & transplant renal artery s/p right groin exploration, washout of the RP space, & transplant nephrectomy w/ repair of the right external iliac artery w/ a bovine patch; persistent right iliac fossa collection s/p IR drainage  - Dialysis as per nephrology, appreciate nephrology recommendations    Heme: anemia of chronic diseases, saddle PE, right external iliac & common femoral DVTs s/p IVC filter  - Monitor CBC and coags  - Heparin infusion for anticoagulation for DVTs and PE  - Epogen with dialysis as per nephrology    ID: Enterobacter cloacae UTI, transplant perinephric collection & right iliac fossa collection w/ carbapenem resistant Enterobacter cloacae & E. coli  - Monitor WBC, temperature, and procalcitonin  - Empiric antibiotics with meropenem, vancomycin, fluconazole, and amikacin  - Sputum on 1/18 with normal respiratory harvey  - Blood cultures from 1/16 no growth to date, 1/21 pending.   - Right iliac fossa collection culture from 1/15 with no growth.  - RP hematoma culture from 1/10 with carbapenem resistant Enterobacter cloacae    Endo: no acute issues  - Monitor glucose on BMP    Misc: gout, glaucoma  - Home allopurinol for gout  - Home eyedrops (latanoprost, dorzolamide, & brimonidine) for glaucoma  - PT/OT    Disposition:  - Full code  - Will remain in SICU

## 2020-01-21 NOTE — OCCUPATIONAL THERAPY INITIAL EVALUATION ADULT - ADDITIONAL COMMENTS
hospital course complicated by Infected transplant perinephric hematoma s/p right groin exploration, washout, & biopsy c/b persistent perinephric collection s/p FNA, Right external iliac & common femoral DVTs s/p IVC filter & thrombectomy of the RLE veins, Gross hematuria w/ pseudoaneurysm of the right external iliac & transplant renal artery seen on IR angiogram s/p right groin exploration, washout of the RP space, and transplant nephrectomy w/ repair of the right external iliac artery w/ a bovine patch Persistent right iliac fossa collection s/p IR drainage. Cardiac arrest secondary to a saddle PE

## 2020-01-21 NOTE — PROGRESS NOTE ADULT - SUBJECTIVE AND OBJECTIVE BOX
VASCULAR SURGERY DAILY PROGRESS NOTE:    Subjective:  Patient seen and examined at bedside with surgical team, patient without complaints.     24 HOUR EVENTS per SICU:   - RLQ U/S with interval smaller fluid collection.   - Fluconazole switched to micafungin for concern over QT.   - Refused HD.   - L groin CVC, R radial a-line removed.   - Febrile to 100.8, blood pressure soft with systolic 90s: resent blood cx, held metoprolol  - CT C/A/P pending today, will discuss with Transplant team administration of IV contrast for abscess.     Objective:  Vital Signs Last 24 Hrs  T(C): 37.3 (21 Jan 2020 07:00), Max: 38.2 (20 Jan 2020 19:00)  T(F): 99.1 (21 Jan 2020 07:00), Max: 100.8 (20 Jan 2020 19:00)  HR: 63 (21 Jan 2020 09:00) (58 - 66)  BP: 107/64 (21 Jan 2020 09:00) (89/57 - 143/72)  BP(mean): 78 (21 Jan 2020 09:00) (67 - 93)  RR: 13 (21 Jan 2020 09:00) (11 - 27)  SpO2: 97% (21 Jan 2020 09:00) (95% - 100%)    I&O's Detail    20 Jan 2020 07:01  -  21 Jan 2020 07:00  --------------------------------------------------------  IN:    dextrose 5% + sodium chloride 0.9%: 90 mL    heparin Infusion: 322 mL    Oral Fluid: 360 mL    Solution: 250 mL  Total IN: 1022 mL    OUT:    Bulb: 160 mL  Total OUT: 160 mL    Total NET: 862 mL      21 Jan 2020 07:01  -  21 Jan 2020 10:46  --------------------------------------------------------  IN:    heparin Infusion: 28 mL  Total IN: 28 mL    OUT:  Total OUT: 0 mL    Total NET: 28 mL          MEDICATIONS  (STANDING):  allopurinol 100 milliGRAM(s) Oral daily  aMIOdarone    Tablet 200 milliGRAM(s) Oral two times a day  brimonidine 0.2% Ophthalmic Solution 1 Drop(s) Both EYES three times a day  chlorhexidine 2% Cloths 1 Application(s) Topical <User Schedule>  gabapentin 100 milliGRAM(s) Oral every 12 hours  heparin  Infusion 1000 Unit(s)/Hr (14 mL/Hr) IV Continuous <Continuous>  latanoprost 0.005% Ophthalmic Solution 1 Drop(s) Both EYES daily  meropenem  IVPB 500 milliGRAM(s) IV Intermittent every 24 hours  micafungin IVPB 100 milliGRAM(s) IV Intermittent every 24 hours  Nephro-candace 1 Tablet(s) Oral daily  vancomycin  IVPB 500 milliGRAM(s) IV Intermittent <User Schedule>    MEDICATIONS  (PRN):  acetaminophen   Tablet .. 975 milliGRAM(s) Oral every 6 hours PRN Mild Pain (1 - 3)  HYDROmorphone  Injectable 0.5 milliGRAM(s) IV Push every 3 hours PRN Severe Pain (7 - 10)  lidocaine 2% Jelly 1 milliLiter(s) IntraUrethral two times a day PRN irritation  oxyCODONE    IR 5 milliGRAM(s) Oral every 4 hours PRN Moderate Pain (4 - 6)                            8.1    17.07 )-----------( 470      ( 21 Jan 2020 00:26 )             25.8       01-21    131<L>  |  94<L>  |  31<H>  ----------------------------<  100<H>  4.1   |  22  |  7.26<H>    Ca    8.3<L>      21 Jan 2020 00:26  Phos  4.1     01-21  Mg     2.0     01-21    TPro  5.3<L>  /  Alb  2.0<L>  /  TBili  1.2  /  DBili  0.7<H>  /  AST  13  /  ALT  6<L>  /  AlkPhos  238<H>  01-21        Physical Exam  Constitutional: NAD  Respiratory: breathing comfortably on RA  Abd: soft, NT, ND, RLQ drain with minimal serosang output, VAC in place  Ext: weeping b/l LE edema, +DP palpable b/l

## 2020-01-21 NOTE — PROGRESS NOTE ADULT - ATTENDING COMMENTS
Dr. Philippe (Attending Physician)  N - pain well controlled  P - Saddle PE  C - polymorphic VT and cardiac arrest, QTc prolonged  GI - right lower quadrant collection improved on ultrasound but febrile   - ESRD s/p explanted kidney  H - hep gtt for PE, DVTs  ID - vanc, zora, discuss dc antifungals with ID

## 2020-01-21 NOTE — OCCUPATIONAL THERAPY INITIAL EVALUATION ADULT - LIVES WITH, PROFILE
lives with his mother, and his son, in a house. +tub, +standard toilet, Independent at baseline, +driving.

## 2020-01-21 NOTE — PROGRESS NOTE ADULT - ASSESSMENT
49 year old male with ESRD on HD, now s/p ddrt on 12/8/2019 course complicated by TMA/profound ATN, who presents from a HD center with a fever and cough. He was initially admitted with influenza. He is s/p washout of infected collection and biopsy which revealed ATN.  He was found to have diffusely enlarging perinephric fluid collection.    While on telemetry, he had an episode of a wide complex tachycardia. It initially appears irregular, suggesting an atrial arrhythmia with aberrancy, though the remainder appears more like an episode of non-sustained VT.   s/p washout on 1/1/2020 with worsening sepsis and transfer to ICU, s/p IR drainage of perinephric collection on 1/6. 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 He underwent operative repair of right external iliac artery with pericardial patch, transplant nephrectomy, RLE thrombectomy, and IVC filter on 1/9.     Now s/p corrina ->tachy arrest with tdp, af rvr, now on amio, with large PE    - critical events noted. Now off of pressor support. Bp has improved overall  - developed malignant arrhythmias which seemed to start with bradycardia, leading to more tachycardia and bursts of VT and rapid AF.  The apparent af degenerated into torsades.  He was shocked and has been maintaining SR since  - cont amio load to 10 grams, then 200 daily. No further events over last 72 hours.  - hold metoprolol in the setting of borderline BP.  - repeat ekg daily to evaluate qtc, while on antifungals/amiodarone  - arrhythmias were most likely triggered by the massive vte event, are unlikely to represent a primary cardiac issue  - hs trop noted, though in the context of his arrhythmias, and right heart strain from the pe, the trop is more likely to reflect demand than an acs event  - ideally would be on asa, statin when able     - cont hep gtt for a/c  - echo results noted, with preserved/hyperdynamic lv, with RV failure consistent with acute RV overload  - Further cardiac workup will depend on clinical course.   - will follow with you 49 year old male with ESRD on HD, now s/p ddrt on 12/8/2019 course complicated by TMA/profound ATN, who presents from a HD center with a fever and cough. He was initially admitted with influenza. He is s/p washout of infected collection and biopsy which revealed ATN.  He was found to have diffusely enlarging perinephric fluid collection.    While on telemetry, he had an episode of a wide complex tachycardia. It initially appears irregular, suggesting an atrial arrhythmia with aberrancy, though the remainder appears more like an episode of non-sustained VT.   s/p washout on 1/1/2020 with worsening sepsis and transfer to ICU, s/p IR drainage of perinephric collection on 1/6. 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 He underwent operative repair of right external iliac artery with pericardial patch, transplant nephrectomy, RLE thrombectomy, and IVC filter on 1/9.     Now s/p corrina ->tachy arrest with tdp, af rvr, now on amio, with large PE    - critical events noted. Now off of pressor support. Bp has improved overall  - developed malignant arrhythmias which seemed to start with bradycardia, leading to more tachycardia and bursts of VT and rapid AF.  The apparent af degenerated into torsades.  He was shocked and has been maintaining SR since  - cont amio at 200 daily. No further events over last 72 hours.  - hold metoprolol in the setting of borderline BP.  - repeat ekg daily to evaluate qtc, while on antifungals/amiodarone  - arrhythmias were most likely triggered by the massive vte event, are unlikely to represent a primary cardiac issue  - hs trop noted, though in the context of his arrhythmias, and right heart strain from the pe, the trop is more likely to reflect demand than an acs event  - ideally would be on asa, statin when able     - cont hep gtt for a/c  - echo results noted, with preserved/hyperdynamic lv, with RV failure consistent with acute RV overload  - Further cardiac workup will depend on clinical course.   - will follow with you

## 2020-01-21 NOTE — OCCUPATIONAL THERAPY INITIAL EVALUATION ADULT - PRECAUTIONS/LIMITATIONS, REHAB EVAL
Envarsus held. Received Belatacept 12/13. Started on PLEX (12/12, 12/15). Underwent renal bx on 12/13 c/w profound ATN.  Found to have much improvement to levels, likely related to Tacrolimus. He was discharged home on 12/20/19 w/ outpatient HD. He was now sent to ED by dialysis center after he was found to be febrile to 102, he did not get HD this AM, and last full HD session was on 12/26. Upon arrival, he was febrile to 101.7, other vitals were stable. He states that he had a cough that began 5-6 days ago that has now mostly resolved. He reports no other febrile episodes other than this morning. He admits to having one SOB episode earlier this week that resolved after he got HD. He also states that his mother was hospitalized earlier this week and was flu+. Currently makes less than 1 cup urine/daily. He currently denies SOB, CP, dyspnea, HA, dizziness, N/V, diarrhea, constipation. Envarsus held. Received Belatacept 12/13. Started on PLEX (12/12, 12/15). Underwent renal bx on 12/13 c/w profound ATN.  Found to have much improvement to levels, likely related to Tacrolimus. He was discharged home on 12/20/19 w/ outpatient HD. He was now sent to ED by dialysis center after he was found to be febrile to 102, he did not get HD this AM, and last full HD session was on 12/26. Upon arrival, he was febrile to 101.7, other vitals were stable. He states that he had a cough that began 5-6 days ago that has now mostly resolved. He reports no other febrile episodes other than this morning. He admits to having one SOB episode earlier this week that resolved after he got HD. He also states that his mother was hospitalized earlier this week and was flu+. Currently makes less than 1 cup urine/daily. He currently denies SOB, CP, dyspnea, HA, dizziness, N/V, diarrhea, constipation./fall precautions

## 2020-01-21 NOTE — PROGRESS NOTE ADULT - SUBJECTIVE AND OBJECTIVE BOX
SURGICAL INTENSIVE CARE UNIT DAILY PROGRESS NOTE    24 HOUR EVENTS:   - RLQ U/S with interval smaller fluid collection.   - Refused HD.   - L groin CVC, R radial a-line removed.   - Febrile to 100.8, blood pressure soft with systolic 90s: resent blood cx, held metoprolol  - CT C/A/P pending today.     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 Patient was admitted and hospital course is as follows:    12/27 - started on Tamiflu, Duplex of the transplanted kidney demonstrated an increase in the perinephric hematoma from 5.7 cm to 9.4 cm  12/29 - patient reporting dysuria so urine culture sent, which was positive for Enterobacter cloacae  01/01 - s/p right groin exploration, washout of the perinephric hematoma, and biopsy of the transplanted kidney, cultures of the hematoma positive for Enterobacter cloacae & E. coli, biopsy demonstrates  01/05 - patient noted to be febrile and hypotensive with new episodes of wide complex tachycardia on telemetry, labs significant for worsening leukocytosis, cultures sent, CT scan obtained & demonstrated a persistent perinephric collection, admitted to SICU for hemodynamic monitoring  01/06 - FNA w/ IR with aspiration of 18 mL of clear & pale yellow fluid but cultures negative for any growth, patient began having gross hematuria requiring CBI  01/07 - repeat imaging demonstrated no change in the size of the perinephric collection  01/08 - RLE edema was noted so a LE Duplex was obtained, which demonstrated a right external iliac & common femoral vein DVT, vascular surgery consulted, patient started on a heparin infusion  01/10 - patient became altered with worsening hematuria despite CBI, patient was taken to IR for an angiogram, which demonstrated a pseudoaneurysm of the transplant renal artery & external iliac artery anastomosis so the pseudoaneurysm was stented & an IVC filter was placed, patient taken to the OR and is s/p right groin exploration, washout of the RP space, transplant nephrectomy w/ repair of the right external iliac artery w/ a bovine patch & removal of the stent placed by IR, and thrombectomy of the RLE veins, returned to SICU still intubated  01/13 - acutely bradycardic and hypotensive progressing into PEA arrest with progression into torsades requiring ACLS for a total of ~12 mins, imaging revealed saddle PE with TTE demonstrating RV strain, PERT team consulted, management with heparin infusion as patient is not a tPA candidate, did not require vasopressors shortly after ROSC  01/15 - ultrasound with large right iliac fossa collection s/p percutaneous drainage by IR, 220 mL of dark red fluid drained and 10 Fr drain was left in placed  01/16 - extubated    OR:    DDRT to right iliac fossa. Simulect induction. Two arteries anastomosed to a single cuff on the back table. One vein, one ureter. Ureteral anastomosis performed over a double J stent, which was sutured to the martinez.   Cold ischemia time 25.5 hours. (27 Dec 2019 10:45)      NEURO: AOX3     RESPIRATORY  RR: 13 (01-20-20 @ 23:00) (10 - 28)  SpO2: 96% (01-20-20 @ 23:00) (95% - 100%)    CARDIOVASCULAR  HR: 61 (01-20-20 @ 23:00) (59 - 68)  BP: 92/63 (01-20-20 @ 23:00) (89/57 - 143/72)  BP(mean): 73 (01-20-20 @ 23:00) (67 - 93)  ABP: 136/73 (01-20-20 @ 16:00) (99/52 - 138/68)  ABP(mean): 93 (01-20-20 @ 16:00) (67 - 93)    GI/NUTRITION  Soft, nontender, nondistended.     GENITOURINARY  I&O's Detail    01-19 @ 07:01  -  01-20 @ 07:00  --------------------------------------------------------  IN:    dextrose 5% + sodium chloride 0.9%: 240 mL    heparin Infusion: 336 mL    Solution: 250 mL  Total IN: 826 mL    OUT:    Bulb: 140 mL    VAC (Vacuum Assisted Closure) System: 200 mL  Total OUT: 340 mL    Total NET: 486 mL      01-20 @ 07:01  -  01-21 @ 00:43  --------------------------------------------------------  IN:    dextrose 5% + sodium chloride 0.9%: 90 mL    heparin Infusion: 210 mL    Oral Fluid: 360 mL    Solution: 250 mL  Total IN: 910 mL    OUT:    Bulb: 90 mL  Total OUT: 90 mL    Total NET: 820 mL          01-20    130<L>  |  95<L>  |  27<H>  ----------------------------<  110<H>  3.8   |  23  |  6.37<H>    Ca    8.3<L>      20 Jan 2020 00:40  Phos  4.0     01-20  Mg     2.0     01-20    TPro  5.5<L>  /  Alb  2.2<L>  /  TBili  1.3<H>  /  DBili  0.8<H>  /  AST  11  /  ALT  <5<L>  /  AlkPhos  234<H>  01-20      HEMATOLOGIC                        8.1    17.07 )-----------( 470      ( 21 Jan 2020 00:26 )             25.8     PT/INR - ( 20 Jan 2020 00:40 )   PT: 15.3 sec;   INR: 1.33 ratio         PTT - ( 20 Jan 2020 00:40 )  PTT:76.8 sec    INFECTIOUS DISEASES  RECENT CULTURES:  Specimen Source: .Sputum Sputum, trap  Date/Time: 01-18 @ 15:26  Culture Results:   Normal Respiratory Gillian present  Gram Stain:   No polymorphonuclear leukocytes per low power field  Rare Squamous epithelial cells per low power field  Rare Gram positive cocci in pairs per oil power field  Organism: --  Specimen Source: .Blood Blood  Date/Time: 01-16 @ 14:51  Culture Results:   No growth to date.  Gram Stain: --  Organism: --    IMAGING:  < from: Xray Chest 1 View- PORTABLE-Routine (01.20.20 @ 07:01) >    IMPRESSION:    1. No significant pulmonary vascular congestion.  2. Bibasilar haziness consistent with small effusions and atelectasis.    < end of copied text > SURGICAL INTENSIVE CARE UNIT DAILY PROGRESS NOTE    24 HOUR EVENTS:   - RLQ U/S with interval smaller fluid collection.   - Refused HD.   - L groin CVC, R radial a-line removed.   - Febrile to 100.8, blood pressure soft with systolic 90s: resent blood cx, held metoprolol  - CT C/A/P pending today, will discuss with Transplant team administration of IV contrast for abscess.     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 Patient was admitted and hospital course is as follows:    12/27 - started on Tamiflu, Duplex of the transplanted kidney demonstrated an increase in the perinephric hematoma from 5.7 cm to 9.4 cm  12/29 - patient reporting dysuria so urine culture sent, which was positive for Enterobacter cloacae  01/01 - s/p right groin exploration, washout of the perinephric hematoma, and biopsy of the transplanted kidney, cultures of the hematoma positive for Enterobacter cloacae & E. coli, biopsy demonstrates  01/05 - patient noted to be febrile and hypotensive with new episodes of wide complex tachycardia on telemetry, labs significant for worsening leukocytosis, cultures sent, CT scan obtained & demonstrated a persistent perinephric collection, admitted to SICU for hemodynamic monitoring  01/06 - FNA w/ IR with aspiration of 18 mL of clear & pale yellow fluid but cultures negative for any growth, patient began having gross hematuria requiring CBI  01/07 - repeat imaging demonstrated no change in the size of the perinephric collection  01/08 - RLE edema was noted so a LE Duplex was obtained, which demonstrated a right external iliac & common femoral vein DVT, vascular surgery consulted, patient started on a heparin infusion  01/10 - patient became altered with worsening hematuria despite CBI, patient was taken to IR for an angiogram, which demonstrated a pseudoaneurysm of the transplant renal artery & external iliac artery anastomosis so the pseudoaneurysm was stented & an IVC filter was placed, patient taken to the OR and is s/p right groin exploration, washout of the RP space, transplant nephrectomy w/ repair of the right external iliac artery w/ a bovine patch & removal of the stent placed by IR, and thrombectomy of the RLE veins, returned to SICU still intubated  01/13 - acutely bradycardic and hypotensive progressing into PEA arrest with progression into torsades requiring ACLS for a total of ~12 mins, imaging revealed saddle PE with TTE demonstrating RV strain, PERT team consulted, management with heparin infusion as patient is not a tPA candidate, did not require vasopressors shortly after ROSC  01/15 - ultrasound with large right iliac fossa collection s/p percutaneous drainage by IR, 220 mL of dark red fluid drained and 10 Fr drain was left in placed  01/16 - extubated    OR:    DDRT to right iliac fossa. Simulect induction. Two arteries anastomosed to a single cuff on the back table. One vein, one ureter. Ureteral anastomosis performed over a double J stent, which was sutured to the martinez.   Cold ischemia time 25.5 hours. (27 Dec 2019 10:45)      NEURO: AOX3     RESPIRATORY  RR: 13 (01-20-20 @ 23:00) (10 - 28)  SpO2: 96% (01-20-20 @ 23:00) (95% - 100%)    CARDIOVASCULAR  HR: 61 (01-20-20 @ 23:00) (59 - 68)  BP: 92/63 (01-20-20 @ 23:00) (89/57 - 143/72)  BP(mean): 73 (01-20-20 @ 23:00) (67 - 93)  ABP: 136/73 (01-20-20 @ 16:00) (99/52 - 138/68)  ABP(mean): 93 (01-20-20 @ 16:00) (67 - 93)    GI/NUTRITION  Soft, nontender, nondistended.     GENITOURINARY  I&O's Detail    01-19 @ 07:01  -  01-20 @ 07:00  --------------------------------------------------------  IN:    dextrose 5% + sodium chloride 0.9%: 240 mL    heparin Infusion: 336 mL    Solution: 250 mL  Total IN: 826 mL    OUT:    Bulb: 140 mL    VAC (Vacuum Assisted Closure) System: 200 mL  Total OUT: 340 mL    Total NET: 486 mL      01-20 @ 07:01  -  01-21 @ 00:43  --------------------------------------------------------  IN:    dextrose 5% + sodium chloride 0.9%: 90 mL    heparin Infusion: 210 mL    Oral Fluid: 360 mL    Solution: 250 mL  Total IN: 910 mL    OUT:    Bulb: 90 mL  Total OUT: 90 mL    Total NET: 820 mL          01-20    130<L>  |  95<L>  |  27<H>  ----------------------------<  110<H>  3.8   |  23  |  6.37<H>    Ca    8.3<L>      20 Jan 2020 00:40  Phos  4.0     01-20  Mg     2.0     01-20    TPro  5.5<L>  /  Alb  2.2<L>  /  TBili  1.3<H>  /  DBili  0.8<H>  /  AST  11  /  ALT  <5<L>  /  AlkPhos  234<H>  01-20      HEMATOLOGIC                        8.1    17.07 )-----------( 470      ( 21 Jan 2020 00:26 )             25.8     PT/INR - ( 20 Jan 2020 00:40 )   PT: 15.3 sec;   INR: 1.33 ratio         PTT - ( 20 Jan 2020 00:40 )  PTT:76.8 sec    INFECTIOUS DISEASES  RECENT CULTURES:  Specimen Source: .Sputum Sputum, trap  Date/Time: 01-18 @ 15:26  Culture Results:   Normal Respiratory Gillian present  Gram Stain:   No polymorphonuclear leukocytes per low power field  Rare Squamous epithelial cells per low power field  Rare Gram positive cocci in pairs per oil power field  Organism: --  Specimen Source: .Blood Blood  Date/Time: 01-16 @ 14:51  Culture Results:   No growth to date.  Gram Stain: --  Organism: --    IMAGING:  < from: Xray Chest 1 View- PORTABLE-Routine (01.20.20 @ 07:01) >    IMPRESSION:    1. No significant pulmonary vascular congestion.  2. Bibasilar haziness consistent with small effusions and atelectasis.    < end of copied text > SURGICAL INTENSIVE CARE UNIT DAILY PROGRESS NOTE    24 HOUR EVENTS:   - RLQ U/S with interval smaller fluid collection.   - Fluconazole switched to micafungin for concern over QT.   - Refused HD.   - L groin CVC, R radial a-line removed.   - Febrile to 100.8, blood pressure soft with systolic 90s: resent blood cx, held metoprolol  - CT C/A/P pending today, will discuss with Transplant team administration of IV contrast for abscess.     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 Patient was admitted and hospital course is as follows:    12/27 - started on Tamiflu, Duplex of the transplanted kidney demonstrated an increase in the perinephric hematoma from 5.7 cm to 9.4 cm  12/29 - patient reporting dysuria so urine culture sent, which was positive for Enterobacter cloacae  01/01 - s/p right groin exploration, washout of the perinephric hematoma, and biopsy of the transplanted kidney, cultures of the hematoma positive for Enterobacter cloacae & E. coli, biopsy demonstrates  01/05 - patient noted to be febrile and hypotensive with new episodes of wide complex tachycardia on telemetry, labs significant for worsening leukocytosis, cultures sent, CT scan obtained & demonstrated a persistent perinephric collection, admitted to SICU for hemodynamic monitoring  01/06 - FNA w/ IR with aspiration of 18 mL of clear & pale yellow fluid but cultures negative for any growth, patient began having gross hematuria requiring CBI  01/07 - repeat imaging demonstrated no change in the size of the perinephric collection  01/08 - RLE edema was noted so a LE Duplex was obtained, which demonstrated a right external iliac & common femoral vein DVT, vascular surgery consulted, patient started on a heparin infusion  01/10 - patient became altered with worsening hematuria despite CBI, patient was taken to IR for an angiogram, which demonstrated a pseudoaneurysm of the transplant renal artery & external iliac artery anastomosis so the pseudoaneurysm was stented & an IVC filter was placed, patient taken to the OR and is s/p right groin exploration, washout of the RP space, transplant nephrectomy w/ repair of the right external iliac artery w/ a bovine patch & removal of the stent placed by IR, and thrombectomy of the RLE veins, returned to SICU still intubated  01/13 - acutely bradycardic and hypotensive progressing into PEA arrest with progression into torsades requiring ACLS for a total of ~12 mins, imaging revealed saddle PE with TTE demonstrating RV strain, PERT team consulted, management with heparin infusion as patient is not a tPA candidate, did not require vasopressors shortly after ROSC  01/15 - ultrasound with large right iliac fossa collection s/p percutaneous drainage by IR, 220 mL of dark red fluid drained and 10 Fr drain was left in placed  01/16 - extubated    OR:    DDRT to right iliac fossa. Simulect induction. Two arteries anastomosed to a single cuff on the back table. One vein, one ureter. Ureteral anastomosis performed over a double J stent, which was sutured to the martinez.   Cold ischemia time 25.5 hours. (27 Dec 2019 10:45)      NEURO: AOX3     RESPIRATORY  RR: 13 (01-20-20 @ 23:00) (10 - 28)  SpO2: 96% (01-20-20 @ 23:00) (95% - 100%)    CARDIOVASCULAR  HR: 61 (01-20-20 @ 23:00) (59 - 68)  BP: 92/63 (01-20-20 @ 23:00) (89/57 - 143/72)  BP(mean): 73 (01-20-20 @ 23:00) (67 - 93)  ABP: 136/73 (01-20-20 @ 16:00) (99/52 - 138/68)  ABP(mean): 93 (01-20-20 @ 16:00) (67 - 93)    GI/NUTRITION  Soft, nontender, nondistended.     GENITOURINARY  I&O's Detail    01-19 @ 07:01  -  01-20 @ 07:00  --------------------------------------------------------  IN:    dextrose 5% + sodium chloride 0.9%: 240 mL    heparin Infusion: 336 mL    Solution: 250 mL  Total IN: 826 mL    OUT:    Bulb: 140 mL    VAC (Vacuum Assisted Closure) System: 200 mL  Total OUT: 340 mL    Total NET: 486 mL      01-20 @ 07:01  -  01-21 @ 00:43  --------------------------------------------------------  IN:    dextrose 5% + sodium chloride 0.9%: 90 mL    heparin Infusion: 210 mL    Oral Fluid: 360 mL    Solution: 250 mL  Total IN: 910 mL    OUT:    Bulb: 90 mL  Total OUT: 90 mL    Total NET: 820 mL          01-20    130<L>  |  95<L>  |  27<H>  ----------------------------<  110<H>  3.8   |  23  |  6.37<H>    Ca    8.3<L>      20 Jan 2020 00:40  Phos  4.0     01-20  Mg     2.0     01-20    TPro  5.5<L>  /  Alb  2.2<L>  /  TBili  1.3<H>  /  DBili  0.8<H>  /  AST  11  /  ALT  <5<L>  /  AlkPhos  234<H>  01-20      HEMATOLOGIC                        8.1    17.07 )-----------( 470      ( 21 Jan 2020 00:26 )             25.8     PT/INR - ( 20 Jan 2020 00:40 )   PT: 15.3 sec;   INR: 1.33 ratio         PTT - ( 20 Jan 2020 00:40 )  PTT:76.8 sec    INFECTIOUS DISEASES  RECENT CULTURES:  Specimen Source: .Sputum Sputum, trap  Date/Time: 01-18 @ 15:26  Culture Results:   Normal Respiratory Gillian present  Gram Stain:   No polymorphonuclear leukocytes per low power field  Rare Squamous epithelial cells per low power field  Rare Gram positive cocci in pairs per oil power field  Organism: --  Specimen Source: .Blood Blood  Date/Time: 01-16 @ 14:51  Culture Results:   No growth to date.  Gram Stain: --  Organism: --    IMAGING:  < from: Xray Chest 1 View- PORTABLE-Routine (01.20.20 @ 07:01) >    IMPRESSION:    1. No significant pulmonary vascular congestion.  2. Bibasilar haziness consistent with small effusions and atelectasis.    < end of copied text >

## 2020-01-21 NOTE — PROGRESS NOTE ADULT - ASSESSMENT
49 year old male PMH ESRD on HD ( via LUE AVF) s/p DDRT 12/8/19 (CMV -/+, EBV -/+), NET of pancreas (s/p robotic distal panc/splenectomy 2015), RA with hand contractures who presented to [facility] on 12/27 with cough and fever. RVP with Influenza A s/p Tamiflu treatment course. Now with infected carlos-transplant hematoma.    1/1: s/p Ex-Lap and washout of infected hematoma (Enterobacter from cultures)  1/6: s/p IR guided drainage attempt of hematoma on 1/6 (NGTD from cultures  1/10: s/p transplant nephrectomy, repair of right external iliac artery with a bovine pericardial patch, repair of right external iliac vein and thrombectomy of right lower extremity veins.   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   I suspect Enterobacter is the primary pathogen    PEA arrest and Torsades on 1/13  CT C/A/P (1/13) with Saddle PE, interval increase in size of RP fluid collection (infected hematoma)    Low grade fever overnight. Vancomycin added  Suspect leukocytosis and fever is secondary to pulmonary infarcts/PE versus residual hematoma  I suspect Meropenem monotherapy is sufficient  Pending CT C/A/P today    Overall, Saddle pulmonary embolus, Leukocytosis (increasing), Infected Hematoma, Positive Urine Culture, Influenza Infection, Fever, Renal Transplant Recipient    --Going for CT C/A/P today - follow up on result  --Can continue Vancomycin for now. If blood cultures remain negative within next 24-48 hours (and if no source identified on repeat CT) would discontinue  --Continue Meropenem 500 mg IV Q24H  --I would hold off on further doses of Amikacin  --Would consider discontinuing Micafungin - no evidence of yeast in prior cultures.   --Continue to follow CBC with diff  --Continue to follow temperature curve  --Follow up on preliminary blood cultures   --Management of Saddle PE per 8ICU team and vascular teams    I will continue to follow. Please feel free to contact me with any further questions.    Dada Jacobo M.D.  [facility] Division of Infectious Disease  8AM-5PM: Pager Number [phone]  After Hours (or if no response): Please contact the Infectious Diseases Office at [phone]

## 2020-01-21 NOTE — OCCUPATIONAL THERAPY INITIAL EVALUATION ADULT - PERTINENT HX OF CURRENT PROBLEM, REHAB EVAL
48 yo M PMHx of ESRD on HD  ( via LUE AVF), HTN, Gout, Glaucoma, NET of pancreas (s/p robotic distal panc/splenectomy at [facility] 2015 by Dr. Young, followed by Dr. Raphael, [facility] Oncologist), RA with hand contractures. 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 Post op course c/b DGF requiring HD, thrombocytopenia, elevated LDH and Haptoglobulin. Schistocytes  on peripheral smear concerning for TMA. See below

## 2020-01-21 NOTE — PROGRESS NOTE ADULT - SUBJECTIVE AND OBJECTIVE BOX
[facility] - Melissa Kelsey, Enrique, Maximino, Marin, Ravi Goodson  Office Number:  [phone]    Patient resting comfortably in chair in NAD.  No complaints of chest pain, dyspnea, palpitations, PND, or orthopnea. Feeling better this morning.    ROS: negative unless otherwise mentioned.    Telemetry:  sr    MEDICATIONS  (STANDING):  allopurinol 100 milliGRAM(s) Oral daily  aMIOdarone    Tablet 200 milliGRAM(s) Oral two times a day  brimonidine 0.2% Ophthalmic Solution 1 Drop(s) Both EYES three times a day  chlorhexidine 2% Cloths 1 Application(s) Topical <User Schedule>  gabapentin 100 milliGRAM(s) Oral every 12 hours  heparin  Infusion 1000 Unit(s)/Hr (14 mL/Hr) IV Continuous <Continuous>  latanoprost 0.005% Ophthalmic Solution 1 Drop(s) Both EYES daily  meropenem  IVPB 500 milliGRAM(s) IV Intermittent every 24 hours  micafungin IVPB 100 milliGRAM(s) IV Intermittent every 24 hours  Nephro-candace 1 Tablet(s) Oral daily  vancomycin  IVPB 500 milliGRAM(s) IV Intermittent <User Schedule>    MEDICATIONS  (PRN):  acetaminophen   Tablet .. 975 milliGRAM(s) Oral every 6 hours PRN Mild Pain (1 - 3)  HYDROmorphone  Injectable 0.5 milliGRAM(s) IV Push every 3 hours PRN Severe Pain (7 - 10)  lidocaine 2% Jelly 1 milliLiter(s) IntraUrethral two times a day PRN irritation  oxyCODONE    IR 5 milliGRAM(s) Oral every 4 hours PRN Moderate Pain (4 - 6)      Allergies    coconut (Anaphylaxis)  morphine (Pruritus; Rash)    Intolerances        Vital Signs Last 24 Hrs  T(C): 37.3 (21 Jan 2020 07:00), Max: 38.2 (20 Jan 2020 19:00)  T(F): 99.1 (21 Jan 2020 07:00), Max: 100.8 (20 Jan 2020 19:00)  HR: 66 (21 Jan 2020 07:00) (58 - 66)  BP: 113/57 (21 Jan 2020 07:00) (89/57 - 143/72)  BP(mean): 78 (21 Jan 2020 07:00) (67 - 93)  RR: 13 (21 Jan 2020 07:00) (11 - 27)  SpO2: 97% (21 Jan 2020 07:00) (95% - 100%)    I&O's Summary    20 Jan 2020 07:01  -  21 Jan 2020 07:00  --------------------------------------------------------  IN: 1022 mL / OUT: 160 mL / NET: 862 mL        ON EXAM:    Constitutional: NAD, awake    HEENT: Moist Mucous Membranes, Anicteric  Pulmonary: Decreased breath sounds b/l. No rales, crackles or wheeze appreciated.   Cardiovascular: Regular, S1 and S2, No murmurs, rubs, gallops or clicks  Gastrointestinal: Bowel Sounds present, soft, nontender.   Lymph: + peripheral edema, legs wrapped jolynn. No lymphadenopathy.  Skin: No visible rashes or ulcers.  Psych:  Mood & affect appropriate for situation    LABS: All Labs Reviewed:                        8.1    17.07 )-----------( 470      ( 21 Jan 2020 00:26 )             25.8                         8.2    16.42 )-----------( 439      ( 20 Jan 2020 00:40 )             25.6                         8.1    16.77 )-----------( 397      ( 19 Jan 2020 01:39 )             25.2     21 Jan 2020 00:26    131    |  94     |  31     ----------------------------<  100    4.1     |  22     |  7.26   20 Jan 2020 00:40    130    |  95     |  27     ----------------------------<  110    3.8     |  23     |  6.37   19 Jan 2020 01:39    134    |  97     |  22     ----------------------------<  107    3.8     |  24     |  5.28     Ca    8.3        21 Jan 2020 00:26  Ca    8.3        20 Jan 2020 00:40  Ca    8.2        19 Jan 2020 01:39  Phos  4.1       21 Jan 2020 00:26  Phos  4.0       20 Jan 2020 00:40  Phos  3.8       19 Jan 2020 01:39  Mg     2.0       21 Jan 2020 00:26  Mg     2.0       20 Jan 2020 00:40  Mg     2.0       19 Jan 2020 01:39    TPro  5.3    /  Alb  2.0    /  TBili  1.2    /  DBili  0.7    /  AST  13     /  ALT  6      /  AlkPhos  238    21 Jan 2020 00:26  TPro  5.5    /  Alb  2.2    /  TBili  1.3    /  DBili  0.8    /  AST  11     /  ALT  <5     /  AlkPhos  234    20 Jan 2020 00:40  TPro  5.2    /  Alb  2.1    /  TBili  1.4    /  DBili  1.0    /  AST  9      /  ALT  <5     /  AlkPhos  203    19 Jan 2020 01:39    PT/INR - ( 21 Jan 2020 00:26 )   PT: 15.9 sec;   INR: 1.37 ratio         PTT - ( 21 Jan 2020 00:26 )  PTT:70.3 sec      Blood Culture: Organism --  Gram Stain Blood -- Gram Stain   No polymorphonuclear leukocytes per low power field  Rare Squamous epithelial cells per low power field  Rare Gram positive cocci in pairs per oil power field  Specimen Source .Sputum Sputum, trap  Culture-Blood --    Organism --  Gram Stain Blood -- Gram Stain --  Specimen Source .Blood Blood  Culture-Blood --

## 2020-01-21 NOTE — OCCUPATIONAL THERAPY INITIAL EVALUATION ADULT - PLANNED THERAPY INTERVENTIONS, OT EVAL
parent/caregiver training.../IADL retraining/balance training/transfer training/ADL retraining/bed mobility training/strengthening

## 2020-01-21 NOTE — PROGRESS NOTE ADULT - SUBJECTIVE AND OBJECTIVE BOX
Interventional Radiology Follow- Up Note      This is a 49y Male s/p IVC filter placement and right external iliac stent placement on 1/13 in Interventional Radiology with Dr. Valle and s/p right pelvic drainage on 1/15 with Dr. Bro    Patient seen and examined @ bedside. No indicators of pain.     Vitals: T(F): 99.1 (01-21-20 @ 11:00), Max: 100.8 (01-20-20 @ 19:00)  HR: 69 (01-21-20 @ 12:00) (58 - 69)  BP: 126/66 (01-21-20 @ 12:00) (89/57 - 143/72)  RR: 12 (01-21-20 @ 12:00) (11 - 26)  SpO2: 96% (01-21-20 @ 12:00) (95% - 100%)      LABS:                        8.1    17.07 )-----------( 470      ( 21 Jan 2020 00:26 )             25.8     01-21    131<L>  |  94<L>  |  31<H>  ----------------------------<  100<H>  4.1   |  22  |  7.26<H>    Ca    8.3<L>      21 Jan 2020 00:26  Phos  4.1     01-21  Mg     2.0     01-21    TPro  5.3<L>  /  Alb  2.0<L>  /  TBili  1.2  /  DBili  0.7<H>  /  AST  13  /  ALT  6<L>  /  AlkPhos  238<H>  01-21    PT/INR - ( 21 Jan 2020 00:26 )   PT: 15.9 sec;   INR: 1.37 ratio         PTT - ( 21 Jan 2020 00:26 )  PTT:70.3 sec    Antibiotics: micafungin; vancomycin; meropenem    Cultures: Culture - Fungal, Body Fluid (01.15.20 @ 22:02)    Specimen Source: .Body Fluid Abdominal Fluid    Culture Results:   Testing in progress      24hr Drain output: 116cc    PHYSICAL EXAM:  General: Nontoxic, in NAD, A&O x3  Abdomen: soft, NTND  Extremities: no pedal edema or calf tenderness noted   Drain device: Drain intact, dressing clean, dry, intact      Assessment/Plan:    Impression: 49M PMHx of ESRD on HD  ( via LUE AVF), HTN, Gout, Glaucoma, NET of pancreas, RA with hand contractures. s/p DDRT 12/8/19,  Post op course c/b DGF requiring HD. Renal bx x 2 c/w profound ATN. Re-admitted with Influenza and perinephric hematoma. s/p hematoma evacuation/ abd washout and renal biopsy. Post op course c/b leukocytosis, infected hematoma, acute RLE DVT, and bleeding pseudoaneurysm at txp renal artery anastomosis. Now s/p graft nephrectomy. Course further complicated by saddle PE causing right heart strain and hemodynamic instability leading to cardiac arrest requiring ACLS, now with ROSC and intubated.       -continue to monitor daily ouput    -flush drain with 5cc NS forward only once daily; do not aspirate  -continue IV abx  -trend vitals, labs  -care per SICU and transplant team  -will discuss with IR attending     Please call IR at extension [phone] with any questions, concerns, or issues regarding above.        Olivia Fermin, [facility]  Spectra # [phone]

## 2020-01-21 NOTE — PROGRESS NOTE ADULT - SUBJECTIVE AND OBJECTIVE BOX
Transplant Surgery - Multidisciplinary Rounds  --------------------------------------------------------------  R DDRT    Date:  12/8/19     Present:   Patient seen with multidisciplinary team including Transplant Surgeon: Dr. Espinal, Transplant Nephrologist: Dr. Irvin, NP: Stefan during am rounds and examined with Dr. Espinal. Disciplines not in attendance will be notified of the plan.     HPI: 49M PMHx of ESRD on HD  ( via LUE AVF), HTN, Gout, Glaucoma, NET of pancreas (s/p robotic distal panc/splenectomy at [facility] 2015 by Dr. Young, followed by Dr. Raphael, [facility] Oncologist), RA with hand contractures. He underwent DDRT on 12/8/19. Post op course c/b DGF requiring HD, thrombocytopenia, elevated LDH and haptoglobin. Schistocytes  on peripheral smear concerning for TMA. Envarsus held. Received Belatacept 12/13. Started on PLEX (12/12, 12/15). Underwent renal bx on 12/13 c/w profound ATN.  Discharged home on 12/20/19 w/ outpatient HD.     Re-admitted 12/27 with influenza, completed treatement with Tamiflu on 12/31. Urine cx on admission grew Ent cloacae, was started on meropenem 12/31. Renal US on admission with increasing hematoma.   ·	OR 1/1 for ex-lap, hematoma evacuation, washout and renal bx. (c/w ATN)  OR cxs grew CRE and e coli. Post op course c/b:   ·	fevers and leukocytosis, CT a/p (1/4) with increased perinephric collection. Underwent IR guided collection 1/6   ·	New onset hematuria, martinez inserted 1/7, started CBI  ·	RLE edema, RLE doppler (1/8) with acute above the knee thrombus of R external iliac, common femoral and femoral veins, Acute calf vein thrombus affecting R soleal vein, Heparin gtt initiated   ·	AMS 1/9  (head CT neg), hyponatremic  ·	Significant hematuria and bleeding around the martinez. Angio 1/9 showed actively bleeding pseudoaneurysm at the anastomosis of the transplant renal artery with the recipient iliac artery. A stent was placed in the iliac artery to  control hemorrhage. IVC filter placed.   ·	Emergent OR 1/9-1/10 for graft nephrectomy;  ureter to bladder anastomosis was completely disrupted, repaired the R external iliac artery with bovine pericardial patch, repaired right external iliac vein, performed thrombectomy of RLE veins, Intra op received 3u PRBC.   ·	Extubated 1/11  ·	1/13 Saddle PE, with right heart strain and complicated by PEA arrest followed by T[place], ROSC achieved after ACLS; re-intubated  ·	1/15 Renal US with 13.7 x 5.0 x 1.5 cm in RLQ transplant bed; s/p bedside IR guided drainage, pigtail in place. Fluid cx to date with NG    Interval events:    - Patient refused HD yesterday and missed a dose of Vanco          - Febrile to 38.2 evening at 7pm, culture sent   - diflucan changed to micafungin due to concern of interaction with amiodarone to prolong QTc      cxs:   12/29: Urine Cx: Enterobacter Cloacae  1/1 OR Perinephric collection Cx:  Carbapenem resistant Ent Cloacae, E. Coli  1/1: R kidney abscess swab: Enterobacter Cloacae  1/1  OR tissue: Enterobacter Cloacae  1/5: Urine Cx: Enteropbacter Cloacae  1/5: Skin incision Cx (bedside): NG  1/6: Adominal fluid Cx from IR:  NG   1/7: Urine Cx:  NG  1/7: BCX2:  NG  1/10 retroperit hematoma - enerobacter   1/10 bladder hematoma - enterobacter  1/15 Drainage fluid - NGTD    Potential Discharge date: pending clinical improvement    MEDICATIONS  (STANDING):  allopurinol 100 milliGRAM(s) Oral daily  aMIOdarone    Tablet 200 milliGRAM(s) Oral two times a day  brimonidine 0.2% Ophthalmic Solution 1 Drop(s) Both EYES three times a day  chlorhexidine 2% Cloths 1 Application(s) Topical <User Schedule>  gabapentin 100 milliGRAM(s) Oral every 12 hours  heparin  Infusion 1000 Unit(s)/Hr (14 mL/Hr) IV Continuous <Continuous>  latanoprost 0.005% Ophthalmic Solution 1 Drop(s) Both EYES daily  meropenem  IVPB 500 milliGRAM(s) IV Intermittent every 24 hours  micafungin IVPB 100 milliGRAM(s) IV Intermittent every 24 hours  Nephro-candace 1 Tablet(s) Oral daily  vancomycin  IVPB 500 milliGRAM(s) IV Intermittent <User Schedule>    MEDICATIONS  (PRN):  acetaminophen   Tablet .. 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  Carpal tunnel syndrome  Brain cyst: had MRI recently- now gone  Anemia  Gastric ulcer: Long time ago  Rheumatoid arthritis  Renal transplant recipient  Breakdown (mechanical) of penile (implanted) prosthesis, sequela  End-stage renal disease (ESRD): PERMACATH PLACEMENT  Abscess of left buttock: s/p I &amp; D  AV fistula: placement left lower arm  S/P cystoscopy: stent placement &amp; removal for kidney stones, lithiotripsy  s/p Lt Carpal tunnel: 2011    Vital Signs Last 24 Hrs  T(C): 37.3 (21 Jan 2020 11:00), Max: 38.2 (20 Jan 2020 19:00)  T(F): 99.1 (21 Jan 2020 11:00), Max: 100.8 (20 Jan 2020 19:00)  HR: 68 (21 Jan 2020 11:00) (58 - 68)  BP: 129/77 (21 Jan 2020 11:00) (89/57 - 143/72)  BP(mean): 98 (21 Jan 2020 11:00) (67 - 98)  RR: 17 (21 Jan 2020 11:00) (11 - 26)  SpO2: 99% (21 Jan 2020 11:00) (95% - 100%)    CBC (01-21 @ 00:26)                          8.1<L>                   17.07<H>  )--------------(  470<H>     --    % Neuts, --    % Lymphs, ANC: --                              25.8<L>  CBC (01-20 @ 00:40)                          8.2<L>                   16.42<H>  )--------------(  439<H>     --    % Neuts, --    % Lymphs, ANC: --                              25.6<L>    BMP (01-21 @ 00:26)       131<L>  |  94<L>   |  31<H> 			Ca++ --      Ca 8.3<L>       ---------------------------------( 100<H>		Mg 2.0          4.1     |  22      |  7.26<H>			Ph 4.1     BMP (01-20 @ 00:40)       130<L>  |  95<L>   |  27<H> 			Ca++ --      Ca 8.3<L>       ---------------------------------( 110<H>		Mg 2.0          3.8     |  23      |  6.37<H>			Ph 4.0       LFTs (01-21 @ 00:26)      TPro 5.3<L> / Alb 2.0<L> / TBili 1.2 / DBili 0.7<H> / AST 13 / ALT 6<L> / AlkPhos 238<H>  LFTs (01-20 @ 00:40)      TPro 5.5<L> / Alb 2.2<L> / TBili 1.3<H> / DBili 0.8<H> / AST 11 / ALT <5<L> / AlkPhos 234<H>    Coags (01-21 @ 00:26)  aPTT 70.3<H> / INR 1.37<H> / PT 15.9<H>  Coags (01-20 @ 00:40)  aPTT 76.8<H> / INR 1.33<H> / PT 15.3<H>    -> .Sputum Sputum, trap Culture (01-18 @ 15:26)       No polymorphonuclear leukocytes per low power field  Rare Squamous epithelial cells per low power field  Rare Gram positive cocci in pairs per oil power field    NG    Normal Respiratory Gillian present    -> .Blood Blood Culture (01-16 @ 14:51)     NG    NG    No growth to date.    -> .Body Fluid Abdominal Fluid Culture (01-15 @ 22:02)       polymorphonuclear leukocytes seen  No organisms seen  by cytocentrifuge    NG    No growth at 5 days        CBC (01-20 @ 00:40)                          8.2<L>                   16.42<H>  )--------------(  439<H>     --    % Neuts, --    % Lymphs, ANC: --                              25.6<L>  CBC (01-19 @ 01:39)                          8.1<L>                   16.77<H>  )--------------(  397        --    % Neuts, --    % Lymphs, ANC: --                              25.2<L>    BMP (01-20 @ 00:40)       130<L>  |  95<L>   |  27<H> 			Ca++ --      Ca 8.3<L>       ---------------------------------( 110<H>		Mg 2.0          3.8     |  23      |  6.37<H>			Ph 4.0     BMP (01-19 @ 01:39)       134<L>  |  97      |  22    			Ca++ --      Ca 8.2<L>       ---------------------------------( 107<H>		Mg 2.0          3.8     |  24      |  5.28<H>			Ph 3.8       LFTs (01-20 @ 00:40)      TPro 5.5<L> / Alb 2.2<L> / TBili 1.3<H> / DBili 0.8<H> / AST 11 / ALT <5<L> / AlkPhos 234<H>  LFTs (01-19 @ 01:39)      TPro 5.2<L> / Alb 2.1<L> / TBili 1.4<H> / DBili 1.0<H> / AST 9<L> / ALT <5<L> / AlkPhos 203<H>    Coags (01-20 @ 00:40)  aPTT 76.8<H> / INR 1.33<H> / PT 15.3<H>  Coags (01-19 @ 15:45)  aPTT 75.4<H> / INR -- / PT --    -> .Sputum Sputum, trap Culture (01-18 @ 15:26)       No polymorphonuclear leukocytes per low power field  Rare Squamous epithelial cells per low power field  Rare Gram positive cocci in pairs per oil power field    NG    Normal Respiratory Gillian present    -> .Blood Blood Culture (01-16 @ 14:51)     NG    NG    No growth to date.    -> .Body Fluid Abdominal Fluid Culture (01-15 @ 22:02)       polymorphonuclear leukocytes seen  No organisms seen  by cytocentrifuge    NG    No growth    REVIEW OF SYSTEMS  Gen: + weakness   Skin: No rashes  Head/Eyes/Ears/Mouth: No headache; Normal hearing; Normal vision w/o blurriness; No sinus pain/discomfort, sore throat  Respiratory: + cough, dry  CV: No chest pain, PND, orthopnea  GI: incisional tenderness   : + martinez  MSK: + lower extrem edema  Neuro: No dizziness/lightheadedness, weakness, seizures, numbness, tingling  Heme: No easy bruising or bleeding  Endo: No heat/cold intolerance  Psych: No significant nervousness, anxiety, stress, depression    PHYSICAL EXAM:    Constitutional: extubated, NAD  	Eyes: Anicteric, PERRLA  	Respiratory: + crackles  	Cardiovascular: RRR  	Gastrointestinal: Soft abdomen, appropriate incisional TTP, ND.  wound vac ss/murky output, RLQ drain with SS drainage    Genitourinary:  Martinez, anuric, residual hematuria in bag    Extremities: b/l lower ext edema, R> L, RUE +3 edema intact with ace wrapped  	Vascular: palpable DP pulses. L AVF palpable  	Neurological: A&O x3.  	Skin: no new lesions/ulcerations  	Musculoskeletal: Moving all extremities

## 2020-01-22 ENCOUNTER — APPOINTMENT (OUTPATIENT)
Dept: TRANSPLANT | Facility: CLINIC | Age: 50
End: 2020-01-22

## 2020-01-22 LAB
ALBUMIN SERPL ELPH-MCNC: 2.1 G/DL — LOW (ref 3.3–5)
ALP SERPL-CCNC: 255 U/L — HIGH (ref 40–120)
ALT FLD-CCNC: 5 U/L — LOW (ref 10–45)
AMIKACIN TROUGH SERPL-MCNC: 4.6 UG/ML — SIGNIFICANT CHANGE UP
ANION GAP SERPL CALC-SCNC: 14 MMOL/L — SIGNIFICANT CHANGE UP (ref 5–17)
APTT BLD: 76.8 SEC — HIGH (ref 27.5–36.3)
AST SERPL-CCNC: 17 U/L — SIGNIFICANT CHANGE UP (ref 10–40)
BASOPHILS # BLD AUTO: 0 K/UL — SIGNIFICANT CHANGE UP (ref 0–0.2)
BASOPHILS NFR BLD AUTO: 0 % — SIGNIFICANT CHANGE UP (ref 0–2)
BILIRUB DIRECT SERPL-MCNC: 0.6 MG/DL — HIGH (ref 0–0.2)
BILIRUB INDIRECT FLD-MCNC: 0.4 MG/DL — SIGNIFICANT CHANGE UP (ref 0.2–1)
BILIRUB SERPL-MCNC: 1 MG/DL — SIGNIFICANT CHANGE UP (ref 0.2–1.2)
BUN SERPL-MCNC: 17 MG/DL — SIGNIFICANT CHANGE UP (ref 7–23)
CALCIUM SERPL-MCNC: 8 MG/DL — LOW (ref 8.4–10.5)
CHLORIDE SERPL-SCNC: 96 MMOL/L — SIGNIFICANT CHANGE UP (ref 96–108)
CO2 SERPL-SCNC: 25 MMOL/L — SIGNIFICANT CHANGE UP (ref 22–31)
CREAT SERPL-MCNC: 4.72 MG/DL — HIGH (ref 0.5–1.3)
EOSINOPHIL # BLD AUTO: 0 K/UL — SIGNIFICANT CHANGE UP (ref 0–0.5)
EOSINOPHIL NFR BLD AUTO: 0 % — SIGNIFICANT CHANGE UP (ref 0–6)
GAS PNL BLDV: SIGNIFICANT CHANGE UP
GLUCOSE SERPL-MCNC: 129 MG/DL — HIGH (ref 70–99)
HCT VFR BLD CALC: 23.2 % — LOW (ref 39–50)
HGB BLD-MCNC: 7.6 G/DL — LOW (ref 13–17)
INR BLD: 1.52 RATIO — HIGH (ref 0.88–1.16)
LYMPHOCYTES # BLD AUTO: 1.69 K/UL — SIGNIFICANT CHANGE UP (ref 1–3.3)
LYMPHOCYTES # BLD AUTO: 8.8 % — LOW (ref 13–44)
MAGNESIUM SERPL-MCNC: 1.8 MG/DL — SIGNIFICANT CHANGE UP (ref 1.6–2.6)
MCHC RBC-ENTMCNC: 29.6 PG — SIGNIFICANT CHANGE UP (ref 27–34)
MCHC RBC-ENTMCNC: 32.8 GM/DL — SIGNIFICANT CHANGE UP (ref 32–36)
MCV RBC AUTO: 90.3 FL — SIGNIFICANT CHANGE UP (ref 80–100)
MONOCYTES # BLD AUTO: 2.19 K/UL — HIGH (ref 0–0.9)
MONOCYTES NFR BLD AUTO: 11.4 % — SIGNIFICANT CHANGE UP (ref 2–14)
NEUTROPHILS # BLD AUTO: 15.19 K/UL — HIGH (ref 1.8–7.4)
NEUTROPHILS NFR BLD AUTO: 78.9 % — HIGH (ref 43–77)
PHOSPHATE SERPL-MCNC: 2.5 MG/DL — SIGNIFICANT CHANGE UP (ref 2.5–4.5)
PLATELET # BLD AUTO: 556 K/UL — HIGH (ref 150–400)
POTASSIUM SERPL-MCNC: 3.3 MMOL/L — LOW (ref 3.5–5.3)
POTASSIUM SERPL-SCNC: 3.3 MMOL/L — LOW (ref 3.5–5.3)
PROT SERPL-MCNC: 5.2 G/DL — LOW (ref 6–8.3)
PROTHROM AB SERPL-ACNC: 17.6 SEC — HIGH (ref 10–12.9)
RBC # BLD: 2.57 M/UL — LOW (ref 4.2–5.8)
RBC # FLD: 17.1 % — HIGH (ref 10.3–14.5)
SODIUM SERPL-SCNC: 135 MMOL/L — SIGNIFICANT CHANGE UP (ref 135–145)
WBC # BLD: 19.25 K/UL — HIGH (ref 3.8–10.5)
WBC # FLD AUTO: 19.25 K/UL — HIGH (ref 3.8–10.5)

## 2020-01-22 PROCEDURE — 99233 SBSQ HOSP IP/OBS HIGH 50: CPT

## 2020-01-22 PROCEDURE — 71045 X-RAY EXAM CHEST 1 VIEW: CPT | Mod: 26

## 2020-01-22 PROCEDURE — 99232 SBSQ HOSP IP/OBS MODERATE 35: CPT

## 2020-01-22 PROCEDURE — 49423 EXCHANGE DRAINAGE CATHETER: CPT

## 2020-01-22 PROCEDURE — 75984 XRAY CONTROL CATHETER CHANGE: CPT | Mod: 26

## 2020-01-22 PROCEDURE — 99233 SBSQ HOSP IP/OBS HIGH 50: CPT | Mod: GC

## 2020-01-22 RX ORDER — MICAFUNGIN SODIUM 100 MG/1
100 INJECTION, POWDER, LYOPHILIZED, FOR SOLUTION INTRAVENOUS EVERY 24 HOURS
Refills: 0 | Status: DISCONTINUED | OUTPATIENT
Start: 2020-01-22 | End: 2020-01-23

## 2020-01-22 RX ORDER — MAGNESIUM SULFATE 500 MG/ML
2 VIAL (ML) INJECTION ONCE
Refills: 0 | Status: COMPLETED | OUTPATIENT
Start: 2020-01-22 | End: 2020-01-22

## 2020-01-22 RX ORDER — ERYTHROPOIETIN 10000 [IU]/ML
10000 INJECTION, SOLUTION INTRAVENOUS; SUBCUTANEOUS
Refills: 0 | Status: DISCONTINUED | OUTPATIENT
Start: 2020-01-22 | End: 2020-02-14

## 2020-01-22 RX ORDER — ERYTHROPOIETIN 10000 [IU]/ML
10000 INJECTION, SOLUTION INTRAVENOUS; SUBCUTANEOUS ONCE
Refills: 0 | Status: DISCONTINUED | OUTPATIENT
Start: 2020-01-22 | End: 2020-01-22

## 2020-01-22 RX ADMIN — AMIODARONE HYDROCHLORIDE 200 MILLIGRAM(S): 400 TABLET ORAL at 05:28

## 2020-01-22 RX ADMIN — MICAFUNGIN SODIUM 105 MILLIGRAM(S): 100 INJECTION, POWDER, LYOPHILIZED, FOR SOLUTION INTRAVENOUS at 11:48

## 2020-01-22 RX ADMIN — Medication 50 GRAM(S): at 02:38

## 2020-01-22 RX ADMIN — HEPARIN SODIUM 14 UNIT(S)/HR: 5000 INJECTION INTRAVENOUS; SUBCUTANEOUS at 19:00

## 2020-01-22 RX ADMIN — HYDROMORPHONE HYDROCHLORIDE 0.5 MILLIGRAM(S): 2 INJECTION INTRAMUSCULAR; INTRAVENOUS; SUBCUTANEOUS at 19:43

## 2020-01-22 RX ADMIN — BRIMONIDINE TARTRATE 1 DROP(S): 2 SOLUTION/ DROPS OPHTHALMIC at 14:38

## 2020-01-22 RX ADMIN — GABAPENTIN 100 MILLIGRAM(S): 400 CAPSULE ORAL at 19:07

## 2020-01-22 RX ADMIN — Medication 100 MILLIGRAM(S): at 11:48

## 2020-01-22 RX ADMIN — HEPARIN SODIUM 14 UNIT(S)/HR: 5000 INJECTION INTRAVENOUS; SUBCUTANEOUS at 11:48

## 2020-01-22 RX ADMIN — LATANOPROST 1 DROP(S): 0.05 SOLUTION/ DROPS OPHTHALMIC; TOPICAL at 11:48

## 2020-01-22 RX ADMIN — HYDROMORPHONE HYDROCHLORIDE 0.5 MILLIGRAM(S): 2 INJECTION INTRAMUSCULAR; INTRAVENOUS; SUBCUTANEOUS at 12:55

## 2020-01-22 RX ADMIN — GABAPENTIN 100 MILLIGRAM(S): 400 CAPSULE ORAL at 05:28

## 2020-01-22 RX ADMIN — HYDROMORPHONE HYDROCHLORIDE 0.5 MILLIGRAM(S): 2 INJECTION INTRAMUSCULAR; INTRAVENOUS; SUBCUTANEOUS at 00:31

## 2020-01-22 RX ADMIN — AMIODARONE HYDROCHLORIDE 200 MILLIGRAM(S): 400 TABLET ORAL at 19:07

## 2020-01-22 RX ADMIN — BRIMONIDINE TARTRATE 1 DROP(S): 2 SOLUTION/ DROPS OPHTHALMIC at 05:28

## 2020-01-22 RX ADMIN — MEROPENEM 100 MILLIGRAM(S): 1 INJECTION INTRAVENOUS at 22:01

## 2020-01-22 RX ADMIN — HYDROMORPHONE HYDROCHLORIDE 0.5 MILLIGRAM(S): 2 INJECTION INTRAMUSCULAR; INTRAVENOUS; SUBCUTANEOUS at 20:00

## 2020-01-22 RX ADMIN — HYDROMORPHONE HYDROCHLORIDE 0.5 MILLIGRAM(S): 2 INJECTION INTRAMUSCULAR; INTRAVENOUS; SUBCUTANEOUS at 00:01

## 2020-01-22 RX ADMIN — HYDROMORPHONE HYDROCHLORIDE 0.5 MILLIGRAM(S): 2 INJECTION INTRAMUSCULAR; INTRAVENOUS; SUBCUTANEOUS at 09:00

## 2020-01-22 RX ADMIN — HYDROMORPHONE HYDROCHLORIDE 0.5 MILLIGRAM(S): 2 INJECTION INTRAMUSCULAR; INTRAVENOUS; SUBCUTANEOUS at 12:39

## 2020-01-22 RX ADMIN — HYDROMORPHONE HYDROCHLORIDE 0.5 MILLIGRAM(S): 2 INJECTION INTRAMUSCULAR; INTRAVENOUS; SUBCUTANEOUS at 05:22

## 2020-01-22 RX ADMIN — HYDROMORPHONE HYDROCHLORIDE 0.5 MILLIGRAM(S): 2 INJECTION INTRAMUSCULAR; INTRAVENOUS; SUBCUTANEOUS at 04:52

## 2020-01-22 RX ADMIN — Medication 1 TABLET(S): at 11:48

## 2020-01-22 RX ADMIN — HYDROMORPHONE HYDROCHLORIDE 0.5 MILLIGRAM(S): 2 INJECTION INTRAMUSCULAR; INTRAVENOUS; SUBCUTANEOUS at 08:45

## 2020-01-22 RX ADMIN — BRIMONIDINE TARTRATE 1 DROP(S): 2 SOLUTION/ DROPS OPHTHALMIC at 22:01

## 2020-01-22 RX ADMIN — CHLORHEXIDINE GLUCONATE 1 APPLICATION(S): 213 SOLUTION TOPICAL at 05:28

## 2020-01-22 NOTE — PROGRESS NOTE ADULT - SUBJECTIVE AND OBJECTIVE BOX
Vascular & Interventional Radiology Pre-Procedure Note    Procedure Name: Drainage catheter check, possible exchange/reposition or new catheter.     HPI: 49y Male with ESRD s/p DDRT with a complicated course and eventual explantation with RLQ collection s/p drainage.     Allergies: morphine (Pruritus; Rash)    Medications (Abx/Cardiac/Anticoagulation/Blood Products)    aMIOdarone    Tablet: 200 milliGRAM(s) Oral (01-22 @ 05:28)  heparin  Infusion: 14 mL/Hr IV Continuous (01-21 @ 22:18)  meropenem  IVPB: 100 mL/Hr IV Intermittent (01-21 @ 22:09)  micafungin IVPB: 105 mL/Hr IV Intermittent (01-21 @ 16:00)  micafungin IVPB: 105 mL/Hr IV Intermittent (01-22 @ 11:48)    Data:    T(C): 37.8  HR: 65  BP: 99/57  RR: 21  SpO2: 99%    -WBC 19.25 / HgB 7.6 / Hct 23.2 / Plt 556  -Na 135 / Cl 96 / BUN 17 / Glucose 129  -K 3.3 / CO2 25 / Cr 4.72  -ALT 5 / Alk Phos 255 / T.Bili 1.0  -INR1.52      Plan:   -49y Male presents for Tube check.   -Risks/Benefits/alternatives explained with the patient and witnessed informed consent obtained.

## 2020-01-22 NOTE — PROGRESS NOTE ADULT - SUBJECTIVE AND OBJECTIVE BOX
[facility] -- Melissa Kelsey, Maximino Nunez Pannella, Patel, Savella  Office # [phone]      Follow Up:  PAF, arrest    Subjective/Observations: Patient seen and examined. Events noted. Resting comfortably in bed. No complaints of chest pain, dyspnea, or palpitations reported. No signs of orthopnea or PND.       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  Carpal tunnel syndrome  Brain cyst: had MRI recently- now gone  Anemia  Gastric ulcer: Long time ago  Rheumatoid arthritis  Renal transplant recipient  Breakdown (mechanical) of penile (implanted) prosthesis, sequela  End-stage renal disease (ESRD): PERMACATH PLACEMENT  Abscess of left buttock: s/p I &amp; D  AV fistula: placement left lower arm  S/P cystoscopy: stent placement &amp; removal for kidney stones, lithiotripsy  s/p Lt Carpal tunnel: 2011      MEDICATIONS  (STANDING):  allopurinol 100 milliGRAM(s) Oral daily  aMIOdarone    Tablet 200 milliGRAM(s) Oral two times a day  brimonidine 0.2% Ophthalmic Solution 1 Drop(s) Both EYES three times a day  chlorhexidine 2% Cloths 1 Application(s) Topical <User Schedule>  gabapentin 100 milliGRAM(s) Oral every 12 hours  heparin  Infusion 1000 Unit(s)/Hr (14 mL/Hr) IV Continuous <Continuous>  latanoprost 0.005% Ophthalmic Solution 1 Drop(s) Both EYES daily  meropenem  IVPB 500 milliGRAM(s) IV Intermittent every 24 hours  micafungin IVPB 100 milliGRAM(s) IV Intermittent every 24 hours  Nephro-candace 1 Tablet(s) Oral daily    MEDICATIONS  (PRN):  acetaminophen   Tablet .. 975 milliGRAM(s) Oral every 6 hours PRN Mild Pain (1 - 3)  HYDROmorphone  Injectable 0.5 milliGRAM(s) IV Push every 3 hours PRN Severe Pain (7 - 10)  lidocaine 2% Jelly 1 milliLiter(s) IntraUrethral two times a day PRN irritation  oxyCODONE    IR 5 milliGRAM(s) Oral every 4 hours PRN Moderate Pain (4 - 6)      Allergies    coconut (Anaphylaxis)  morphine (Pruritus; Rash)    Intolerances            Vital Signs Last 24 Hrs  T(C): 37.5 (22 Jan 2020 07:00), Max: 38.7 (21 Jan 2020 19:00)  T(F): 99.5 (22 Jan 2020 07:00), Max: 101.7 (21 Jan 2020 19:00)  HR: 72 (22 Jan 2020 09:02) (64 - 123)  BP: 118/68 (22 Jan 2020 09:02) (77/41 - 145/71)  BP(mean): 86 (22 Jan 2020 09:02) (54 - 105)  RR: 17 (22 Jan 2020 09:02) (9 - 33)  SpO2: 99% (22 Jan 2020 09:02) (79% - 100%)    I&O's Summary    21 Jan 2020 07:01  -  22 Jan 2020 07:00  --------------------------------------------------------  IN: 1672 mL / OUT: 2870 mL / NET: -1198 mL    22 Jan 2020 07:01  -  22 Jan 2020 09:30  --------------------------------------------------------  IN: 28 mL / OUT: 0 mL / NET: 28 mL          PHYSICAL EXAM:  TELE:   Constitutional: NAD, awake   HEENT: Moist Mucous Membranes, Anicteric  Pulmonary: Decreased breath sounds b/l. No rales, crackles or wheeze appreciated.   Cardiovascular: Regular, S1 and S2, No murmurs, rubs, gallops or clicks  Gastrointestinal: Bowel Sounds present, soft, nontender.   Lymph: No peripheral edema. No lymphadenopathy.  Skin: No visible rashes or ulcers.  Psych:  Mood & affect appropriate for situation    LABS: All Labs Reviewed:                        7.6    19.25 )-----------( 556      ( 22 Jan 2020 00:38 )             23.2                         8.1    17.07 )-----------( 470      ( 21 Jan 2020 00:26 )             25.8                         8.2    16.42 )-----------( 439      ( 20 Jan 2020 00:40 )             25.6     22 Jan 2020 00:38    135    |  96     |  17     ----------------------------<  129    3.3     |  25     |  4.72   21 Jan 2020 00:26    131    |  94     |  31     ----------------------------<  100    4.1     |  22     |  7.26   20 Jan 2020 00:40    130    |  95     |  27     ----------------------------<  110    3.8     |  23     |  6.37     Ca    8.0        22 Jan 2020 00:38  Ca    8.3        21 Jan 2020 00:26  Ca    8.3        20 Jan 2020 00:40  Phos  2.5       22 Jan 2020 00:38  Phos  4.1       21 Jan 2020 00:26  Phos  4.0       20 Jan 2020 00:40  Mg     1.8       22 Jan 2020 00:38  Mg     2.0       21 Jan 2020 00:26  Mg     2.0       20 Jan 2020 00:40    TPro  5.2    /  Alb  2.1    /  TBili  1.0    /  DBili  0.6    /  AST  17     /  ALT  5      /  AlkPhos  255    22 Jan 2020 00:38  TPro  5.3    /  Alb  2.0    /  TBili  1.2    /  DBili  0.7    /  AST  13     /  ALT  6      /  AlkPhos  238    21 Jan 2020 00:26  TPro  5.5    /  Alb  2.2    /  TBili  1.3    /  DBili  0.8    /  AST  11     /  ALT  <5     /  AlkPhos  234    20 Jan 2020 00:40    PT/INR - ( 22 Jan 2020 00:38 )   PT: 17.6 sec;   INR: 1.52 ratio         PTT - ( 22 Jan 2020 00:38 )  PTT:76.8 sec

## 2020-01-22 NOTE — PROGRESS NOTE ADULT - PROBLEM SELECTOR PLAN 4
Leukocytosis improving, remains at 19 today. Afebrile.   Continue Meropenem, Amikacin , Diflucan and Vanco.   ID team follow up

## 2020-01-22 NOTE — PROGRESS NOTE ADULT - ASSESSMENT
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 RVP with Influenza A s/p Tamiflu treatment course. Now with infected carlos-transplant hematoma.    1/1: s/p Ex-Lap and washout of infected hematoma (Enterobacter from cultures)  1/6: s/p IR guided drainage attempt of hematoma on 1/6 (NGTD from cultures  1/10: s/p transplant nephrectomy, repair of right external iliac artery with a bovine pericardial patch, repair of right external iliac vein and thrombectomy of right lower extremity veins.   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   I suspect Enterobacter is the primary pathogen    PEA arrest and Torsades on 1/13  CT C/A/P (1/13) with Saddle PE, interval increase in size of RP fluid collection (infected hematoma)  CT C/A/P (1/21) with pulmonary infarcts and residual hematoma (decreased in size)    Reviewed 1/21 imaging with Radiology     Overall, Saddle pulmonary embolus, Fever (worsening trend), Leukocytosis (increasing), Infected Hematoma, Positive Urine Culture, Influenza Infection, Renal Transplant Recipient    --Continue Meropenem 500 mg IV Q24H  --I would hold off on further doses of Amikacin  --Would consider discontinuing Micafungin - no evidence of yeast in prior cultures.   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 RVP with Influenza A s/p Tamiflu treatment course. Now with infected carlos-transplant hematoma.    1/1: s/p Ex-Lap and washout of infected hematoma (Enterobacter from cultures)  1/6: s/p IR guided drainage attempt of hematoma on 1/6 (NGTD from cultures  1/10: s/p transplant nephrectomy, repair of right external iliac artery with a bovine pericardial patch, repair of right external iliac vein and thrombectomy of right lower extremity veins.   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   I suspect Enterobacter is the primary pathogen    PEA arrest and Torsades on 1/13  CT C/A/P (1/13) with Saddle PE, interval increase in size of RP fluid collection (infected hematoma)  CT C/A/P (1/21) with pulmonary infarcts and residual hematoma (decreased in size)  Reviewed 1/21 imaging with Radiology    Suspect fevers and leukocytosis are secondary to either the pulmonary infarcts or from the residual hematoma/collection.   Agree with plan for IR reevaluation of drain  Would continue Meropenem for now.   Now off of Amikacin/Vancomycin - Favor monitoring off  If clinical deterioration can add this coverage back on pending blood cultures.     Overall, Saddle pulmonary embolus, Fever (worsening trend), Leukocytosis (increasing), Infected Hematoma, Positive Urine Culture, Influenza Infection, Renal Transplant Recipient    --Pending IR reevaluation of hematoma drain  --Continue Meropenem 500 mg IV Q24H  --I would hold off on further doses of Amikacin  --Would consider discontinuing Micafungin - no evidence of yeast in prior cultures.   --Continue to follow CBC with diff  --Continue to follow temperature curve  --Follow up on preliminary blood cultures   --Management of Saddle PE per 8ICU team and vascular teams    I will continue to follow. Please feel free to contact me with any further questions.    Dada Jacobo M.D.  [facility] Division of Infectious Disease  8AM-5PM: Pager Number [phone]  After Hours (or if no response): Please contact the Infectious Diseases Office at [phone]

## 2020-01-22 NOTE — PROGRESS NOTE ADULT - ASSESSMENT
Assessment  49y Male with ESRD s/p DDRT on 12/8/19 with course complicated by delayed graft function requiring HD, and infected perinephric hematoma s/p washout on 1/1/2020 with worsening sepsis and transfer to ICU, s/p IR drainage of perinephric collection on 1/6. 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 He underwent operative repair of right external iliac artery with bovine patch, transplant nephrectomy, RLE thrombectomy, and IVC filter on 1/10. Post-op course complicated by cardiac arrest secondary to saddle PE.    Plan  - Cont leg elevation   - CT C/A/P results noted - s/p CT PA, abdomen and pelvis with findings concerning for new pulmonary infarcts. Interval decrease in size of fluid collection in iliac fossa.  - Continue excellent SICU care     ROSLYN Freitas, PGY2  Vascular Surgery   [phone] with any questions

## 2020-01-22 NOTE — PROGRESS NOTE ADULT - ASSESSMENT
50 y/o male presenting with influenza A with a hospital course complicated by:  - Infected transplant perinephric hematoma s/p right groin exploration, washout, & biopsy c/b persistent perinephric collection s/p FNA  - Right external iliac & common femoral DVTs s/p IVC filter & thrombectomy of the RLE veins  - Gross hematuria w/ pseudoaneurysm of the right external iliac & transplant renal artery seen on IR angiogram s/p right groin exploration, washout of the RP space, and transplant nephrectomy w/ repair of the right external iliac artery w/ a bovine patch  - Persistent right iliac fossa collection s/p IR drainage  - Cardiac arrest secondary to a saddle PE    PLAN:    Neuro: acute post-op pain  - Monitor mental status  - Pain control with acetaminophen, oxycodone, Dilaudid, and gabapentin    Resp: saddle PE, new pulmonary infarct sequela  - Monitor pulse oximeter  - Supportive cares and continued anticoagulation for infarction. Daily CXRs.  - Out of bed to chair, ambulate as tolerated, and incentive spirometry to prevent atelectasis    CV: obstructive shock secondary to PE (resolved), s/p PEA arrest with progression to torsades  - Monitor vital signs  - Amiodarone for episode of torsades  - Currently holding home metoprolol 25 mg PO q12hrs for HTN, home hydralazine 50 mg PO BID.    GI: no acute issues  - Regular diet as tolerated  - Bowel regimen with senna & Miralax  - Monitor right iliac fossa IR drain output    Renal: CKD stage V s/p DDRT c/b DGF & perinephric collection s/p right groin exploration, washout, & biopsy c/b persistent perinephric collection s/p FNA; gross hematuria w/ pseudoaneurysm of the right external iliac & transplant renal artery s/p right groin exploration, washout of the RP space, & transplant nephrectomy w/ repair of the right external iliac artery w/ a bovine patch; persistent right iliac fossa collection s/p IR drainage  - Dialysis as per nephrology, appreciate nephrology recommendations    Heme: anemia of chronic diseases, saddle PE, right external iliac & common femoral DVTs s/p IVC filter  - Monitor CBC and coags  - Heparin infusion for anticoagulation for DVTs and PE  - Epogen with dialysis as per nephrology    ID: Enterobacter cloacae UTI, transplant perinephric collection and right iliac fossa collection with carbapenem resistant Enterobacter cloacae (CRE) and E. coli.  - Sputum on 1/18 with normal respiratory harvey  - Blood cultures from 1/16 no growth to date, 1/21 no growth to date.  - Right iliac fossa collection culture from 1/15 with no growth.  - RP hematoma culture from 1/10 with carbapenem resistant Enterobacter cloacae    - Antibiotics: Vancomycin 500 mg IV TIW after dialysis; Meropenem 500 mg IV Q24  - Per ID: Vancomycin may be discontinued if blood cultures remain negative within the next 24-48 hours and no source identified on CT obtained on 1/21/20. No further doses of Amikacin at this time (Amikacin trough 4.6 this AM). Potential discontinuation of Micafungin given no evidence of yeast in prior cultures.   - Trending Procalcitonin, and monitoring for fevers +/- leukocytosis.  - Appreciate additional ID recommendations.   - Reconsultation with IR for possible sinogram +/- tube exchange vs. upsizing. Appreciate IR recommendations.    Endo: No acute issues  - Monitor glucose on BMP    Misc: Gout, glaucoma  - Home allopurinol for gout  - Home eyedrops (latanoprost, dorzolamide, & brimonidine) for glaucoma  - PT/OT    Disposition:  - Full code  - Will remain in SICU 50 y/o male presenting with influenza A with a hospital course complicated by:  - Infected transplant perinephric hematoma s/p right groin exploration, washout, & biopsy c/b persistent perinephric collection s/p FNA  - Right external iliac & common femoral DVTs s/p IVC filter & thrombectomy of the RLE veins  - Gross hematuria w/ pseudoaneurysm of the right external iliac & transplant renal artery seen on IR angiogram s/p right groin exploration, washout of the RP space, and transplant nephrectomy w/ repair of the right external iliac artery w/ a bovine patch  - Persistent right iliac fossa collection s/p IR drainage  - Cardiac arrest secondary to a saddle PE    PLAN:    Neuro: acute post-op pain  - Monitor mental status  - Pain control with acetaminophen, oxycodone, Dilaudid, and gabapentin    Resp: saddle PE, new pulmonary infarct sequela  - Monitor pulse oximeter  - Supportive cares and continued anticoagulation for infarction. Daily CXRs.  - Out of bed to chair, ambulate as tolerated, and incentive spirometry to prevent atelectasis    CV: obstructive shock secondary to PE (resolved), s/p PEA arrest with progression to torsades  - Monitor vital signs  - Amiodarone for episode of torsades  - Currently holding home metoprolol 25 mg PO q12hrs for HTN, home hydralazine 50 mg PO BID.    GI: no acute issues  - Regular diet as tolerated  - Bowel regimen with senna & Miralax  - Monitor right iliac fossa IR drain output    Renal: CKD stage V s/p DDRT c/b DGF & perinephric collection s/p right groin exploration, washout, & biopsy c/b persistent perinephric collection s/p FNA; gross hematuria w/ pseudoaneurysm of the right external iliac & transplant renal artery s/p right groin exploration, washout of the RP space, & transplant nephrectomy w/ repair of the right external iliac artery w/ a bovine patch; persistent right iliac fossa collection s/p IR drainage  - Dialysis as per nephrology, appreciate nephrology recommendations    Heme: anemia of chronic diseases, saddle PE, right external iliac & common femoral DVTs s/p IVC filter  - Monitor CBC and coags  - Heparin infusion for anticoagulation for DVTs and PE  - Epogen with dialysis as per nephrology    ID: Enterobacter cloacae UTI, transplant perinephric collection and right iliac fossa collection with carbapenem resistant Enterobacter cloacae (CRE) and E. coli.  - Sputum on 1/18 with normal respiratory harvye  - Blood cultures from 1/16 no growth to date, 1/21 no growth to date.  - Right iliac fossa collection culture from 1/15 with no growth.  - RP hematoma culture from 1/10 with carbapenem resistant Enterobacter cloacae    - Antibiotics: Vancomycin 500 mg IV TIW after dialysis; Meropenem 500 mg IV Q24  - Per ID: Vancomycin may be discontinued if blood cultures remain negative within the next 24-48 hours and no source identified on CT obtained on 1/21/20. No further doses of Amikacin at this time (Amikacin trough 4.6 this AM). Potential discontinuation of Micafungin given no evidence of yeast in prior cultures.   - Trending Procalcitonin, and monitoring for fevers +/- leukocytosis.  - Appreciate additional ID recommendations.   - Reconsultation with IR for possible sinogram +/- tube exchange vs. upsizing. Appreciate IR recommendations.    Endo: No acute issues  - Monitor glucose on BMP    Misc: Gout, glaucoma  - Home allopurinol for gout  - Home eyedrops (latanoprost, dorzolamide, & brimonidine) for glaucoma  - PT/OT    Disposition:  - Full code  - Will remain in SICU

## 2020-01-22 NOTE — PROGRESS NOTE ADULT - SUBJECTIVE AND OBJECTIVE BOX
[facility] DIVISION OF KIDNEY DISEASES AND HYPERTENSION -- FOLLOW UP NOTE  --------------------------------------------------------------------------------  HPI: 49 yr old man with ESRD s/p DDRT in Dec 2019 complicated by DGF due to ATN and early TMA thought to be due to prograf. Was on belatacept/MMF/Pred immunosuppression. He was admitted with influenza, course complicated by perinephric hematoma complicated by enterobacter infection. s/p surgical exploration and wash out. He developed DVT started on heparin drip, subsequent gross hematuria. He was taken to the OR and found to have dehiscence of the ureter-bladder anastomosis as well as vascular anastomosis. He underwent graft nephrectomy on 1/10/20 with bovine biopatch repair of the EIA. On 1/13/20 he had cardiac arrest due to large pulmonary embolism s/p successful resuscitation. S/p bedside drainage of RLQ collection on 1/15/20, 200 cc removed and drain placed. RUE ultrasound showed DVT in the brachial vein. Pt. underwent CT imaging yesterday which no longer showed saddle emboli, however with distal clots and possibly pulmonary infarcts. Pt. had last HD treatment yesterday with 2L of UF tolerated.    Pt. seen and examined at bedside this AM. Pt. states that he has no new complaints.  PAST HISTORY  --------------------------------------------------------------------------------  No significant changes to PMH, PSH, FHx, SHx, unless otherwise noted    ALLERGIES & MEDICATIONS  --------------------------------------------------------------------------------  Allergies    coconut (Anaphylaxis)  morphine (Pruritus; Rash)    Intolerances    Standing Inpatient Medications  allopurinol 100 milliGRAM(s) Oral daily  aMIOdarone    Tablet 200 milliGRAM(s) Oral two times a day  brimonidine 0.2% Ophthalmic Solution 1 Drop(s) Both EYES three times a day  chlorhexidine 2% Cloths 1 Application(s) Topical <User Schedule>  gabapentin 100 milliGRAM(s) Oral every 12 hours  heparin  Infusion 1000 Unit(s)/Hr IV Continuous <Continuous>  latanoprost 0.005% Ophthalmic Solution 1 Drop(s) Both EYES daily  meropenem  IVPB 500 milliGRAM(s) IV Intermittent every 24 hours  micafungin IVPB 100 milliGRAM(s) IV Intermittent every 24 hours  Nephro-candace 1 Tablet(s) Oral daily  vancomycin  IVPB 500 milliGRAM(s) IV Intermittent <User Schedule    REVIEW OF SYSTEMS  --------------------------------------------------------------------------------  Gen: + lethargy, + fatigue  Respiratory: No dyspnea  CV: No chest pain  GI: No abdominal pain  MSK: + LE edema  Neuro: No dizziness  Heme: No bleeding    All other systems were reviewed and are negative, except as noted.  VITALS/PHYSICAL EXAM  --------------------------------------------------------------------------------  T(C): 37.1 (01-22-20 @ 03:00), Max: 38.7 (01-21-20 @ 19:00)  HR: 123 (01-22-20 @ 06:00) (63 - 123)  BP: 125/84 (01-22-20 @ 06:00) (77/41 - 145/71)  RR: 16 (01-22-20 @ 06:00) (9 - 33)  SpO2: 97% (01-22-20 @ 06:00) (92% - 100%)  Wt(kg): --    01-21-20 @ 07:01  -  01-22-20 @ 07:00  --------------------------------------------------------  IN: 1658 mL / OUT: 2870 mL / NET: -1212 mL    Physical Exam:  	Gen: NAD, resting  	HEENT: supple neck  	Pulm: decreased breath sounds bilaterally  	CV: RRR, S1S2; no rub  	Abd: +BS, soft, RLQ with drain in place  	: No suprapubic tenderness  	LE: b/l pitting edema, R>L (improved)  LABS/STUDIES  --------------------------------------------------------------------------------              7.6    19.25 >-----------<  556      [01-22-20 @ 00:38]              23.2     135  |  96  |  17  ----------------------------<  129      [01-22-20 @ 00:38]  3.3   |  25  |  4.72        Ca     8.0     [01-22-20 @ 00:38]      Mg     1.8     [01-22-20 @ 00:38]      Phos  2.5     [01-22-20 @ 00:38]    Creatinine Trend:  SCr 4.72 [01-22 @ 00:38]  SCr 7.26 [01-21 @ 00:26]  SCr 6.37 [01-20 @ 00:40]  SCr 5.28 [01-19 @ 01:39]  SCr 4.31 [01-18 @ 01:36]

## 2020-01-22 NOTE — PROGRESS NOTE ADULT - PROBLEM SELECTOR PLAN 1
Volume overload improving. Electrolyte balance is acceptable (mild hyponatremia likely 2/2 volume overload). Last HD treatment yesterday, tolerating 2L of UF. Plan for UF today. Monitor labs Volume overload improving. Electrolyte balance is acceptable (mild hyponatremia likely 2/2 volume overload). Last HD treatment yesterday, tolerating 2L of UF. No plans for HD today. Monitor labs

## 2020-01-22 NOTE — PROGRESS NOTE ADULT - SUBJECTIVE AND OBJECTIVE BOX
Transplant Surgery - Multidisciplinary Rounds  --------------------------------------------------------------  R DDRT    Date:  12/8/19     Present:   Patient seen with multidisciplinary team including Transplant Surgeon: Dr. Espinal, Transplant Nephrologist: Dr. Irvin, Nephrology fellow Abel Flores, NP: Aislinn Hunt and MAEVE Sun, surgical resident Mehdi Bess during am rounds and examined with Dr. Espinal. Disciplines not in attendance will be notified of the plan.     HPI: 49M PMHx of ESRD on HD  ( via LUE AVF), HTN, Gout, Glaucoma, NET of pancreas (s/p robotic distal panc/splenectomy at [place] 2015 by Dr. Young, followed by Dr. Raphael, [facility] Oncologist), RA with hand contractures. He underwent DDRT on 12/8/19. Post op course c/b DGF requiring HD, thrombocytopenia, elevated LDH and haptoglobin. Schistocytes  on peripheral smear concerning for TMA. Envarsus held. Received Belatacept 12/13. Started on PLEX (12/12, 12/15). Underwent renal bx on 12/13 c/w profound ATN.  Discharged home on 12/20/19 w/ outpatient HD.     Re-admitted 12/27 with influenza, completed treatement with Tamiflu on 12/31. Urine cx on admission grew Ent cloacae, was started on meropenem 12/31. Renal US on admission with increasing hematoma.   ·	OR 1/1 for ex-lap, hematoma evacuation, washout and renal bx. (c/w ATN)  OR cxs grew CRE and e coli. Post op course c/b:   ·	fevers and leukocytosis, CT a/p (1/4) with increased perinephric collection. Underwent IR guided collection 1/6   ·	New onset hematuria, martinez inserted 1/7, started CBI  ·	RLE edema, RLE doppler (1/8) with acute above the knee thrombus of R external iliac, common femoral and femoral veins, Acute calf vein thrombus affecting R soleal vein, Heparin gtt initiated   ·	AMS 1/9  (head CT neg), hyponatremic  ·	Significant hematuria and bleeding around the martinez. Angio 1/9 showed actively bleeding pseudoaneurysm at the anastomosis of the transplant renal artery with the recipient iliac artery. A stent was placed in the iliac artery to  control hemorrhage. IVC filter placed.   ·	Emergent OR 1/9-1/10 for graft nephrectomy;  ureter to bladder anastomosis was completely disrupted, repaired the R external iliac artery with bovine pericardial patch, repaired right external iliac vein, performed thrombectomy of RLE veins, Intra op received 3u PRBC.   ·	Extubated 1/11  ·	1/13 Saddle PE, with right heart strain and complicated by PEA arrest followed by Torsade, ROSC achieved after ACLS; re-intubated  ·	1/15 Renal US with 13.7 x 5.0 x 1.5 cm in RLQ transplant bed; s/p bedside IR guided drainage, pigtail in place. Fluid cx to date with NG    Interval events:    - HD yesterday -2L. SCr 4.72, K+ 3.3        - Tmax 38.7. On meropenem, Vancomycin and Micafungin  - CT C/A/P:  Pulmonary emboli within the right lower lobar arteries and left lower lobar arteries extending into the distal segments as seen on prior study. The pulmonary emboli seen in the right and left pulmonary arteries are no longer visualized. New bilateral wedgelike opacities in the lower lobes, which may represent pulmonary infarcts. Interval decrease in size in the right lower quadrant fluid collection.     cxs:   12/29: Urine Cx: Enterobacter Cloacae  1/1 OR Perinephric collection Cx:  Carbapenem resistant Ent Cloacae, E. Coli  1/1: R kidney abscess swab: Enterobacter Cloacae  1/1  OR tissue: Enterobacter Cloacae  1/5: Urine Cx: Enteropbacter Cloacae  1/5: Skin incision Cx (bedside): NG  1/6: Adominal fluid Cx from IR:  NG   1/7: Urine Cx:  NG  1/7: BCX2:  NG  1/10 retroperit hematoma - enerobacter   1/10 bladder hematoma - enterobacter  1/15 Drainage fluid - NGTD  1/16: BC X2: NGTD  1/18: SPutum: Normal harvey  1/21: BC X2: NTGD    Potential Discharge date: pending clinical improvement      MEDICATIONS  (STANDING):  allopurinol 100 milliGRAM(s) Oral daily  aMIOdarone    Tablet 200 milliGRAM(s) Oral two times a day  brimonidine 0.2% Ophthalmic Solution 1 Drop(s) Both EYES three times a day  chlorhexidine 2% Cloths 1 Application(s) Topical <User Schedule>  gabapentin 100 milliGRAM(s) Oral every 12 hours  heparin  Infusion 1000 Unit(s)/Hr (14 mL/Hr) IV Continuous <Continuous>  latanoprost 0.005% Ophthalmic Solution 1 Drop(s) Both EYES daily  meropenem  IVPB 500 milliGRAM(s) IV Intermittent every 24 hours  micafungin IVPB 100 milliGRAM(s) IV Intermittent every 24 hours  Nephro-candace 1 Tablet(s) Oral daily    MEDICATIONS  (PRN):  acetaminophen   Tablet .. 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  Carpal tunnel syndrome  Brain cyst: had MRI recently- now gone  Anemia  Gastric ulcer: Long time ago  Rheumatoid arthritis  Renal transplant recipient  Breakdown (mechanical) of penile (implanted) prosthesis, sequela  End-stage renal disease (ESRD): PERMACATH PLACEMENT  Abscess of left buttock: s/p I &amp; D  AV fistula: placement left lower arm  S/P cystoscopy: stent placement &amp; removal for kidney stones, lithiotripsy  s/p Lt Carpal tunnel: 2011      Vital Signs Last 24 Hrs  T(C): 37.5 (22 Jan 2020 07:00), Max: 38.7 (21 Jan 2020 19:00)  T(F): 99.5 (22 Jan 2020 07:00), Max: 101.7 (21 Jan 2020 19:00)  HR: 72 (22 Jan 2020 10:00) (64 - 123)  BP: 114/69 (22 Jan 2020 10:00) (77/41 - 145/71)  BP(mean): 87 (22 Jan 2020 10:00) (54 - 105)  RR: 16 (22 Jan 2020 10:00) (9 - 33)  SpO2: 100% (22 Jan 2020 10:00) (79% - 100%)    I&O's Summary    21 Jan 2020 07:01  -  22 Jan 2020 07:00  --------------------------------------------------------  IN: 1672 mL / OUT: 2870 mL / NET: -1198 mL    22 Jan 2020 07:01  -  22 Jan 2020 10:17  --------------------------------------------------------  IN: 28 mL / OUT: 0 mL / NET: 28 mL                              7.6    19.25 )-----------( 556      ( 22 Jan 2020 00:38 )             23.2     01-22    135  |  96  |  17  ----------------------------<  129<H>  3.3<L>   |  25  |  4.72<H>    Ca    8.0<L>      22 Jan 2020 00:38  Phos  2.5     01-22  Mg     1.8     01-22    TPro  5.2<L>  /  Alb  2.1<L>  /  TBili  1.0  /  DBili  0.6<H>  /  AST  17  /  ALT  5<L>  /  AlkPhos  255<H>  01-22          Culture - Blood (collected 01-21-20 @ 05:11)  Source: .Blood Blood  Preliminary Report (01-22-20 @ 06:01):    No growth to date.    Culture - Blood (collected 01-21-20 @ 03:02)  Source: .Blood Blood  Preliminary Report (01-22-20 @ 04:01):    No growth to date.    Culture - Sputum (collected 01-18-20 @ 15:26)  Source: .Sputum Sputum, trap  Gram Stain (01-18-20 @ 20:56):    No polymorphonuclear leukocytes per low power field    Rare Squamous epithelial cells per low power field    Rare Gram positive cocci in pairs per oil power field  Final Report (01-20-20 @ 18:14):    Normal Respiratory Harvey present    Culture - Blood (collected 01-16-20 @ 14:51)  Source: .Blood Blood-Peripheral  Final Report (01-21-20 @ 15:00):    No growth at 5 days.    Culture - Blood (collected 01-16-20 @ 14:51)  Source: .Blood Blood  Final Report (01-21-20 @ 15:00):    No growth at 5 days.    Culture - Fungal, Body Fluid (collected 01-15-20 @ 22:02)  Source: .Body Fluid Abdominal Fluid  Preliminary Report (01-16-20 @ 08:50):    Testing in progress    Culture - Body Fluid with Gram Stain (collected 01-15-20 @ 22:02)  Source: .Body Fluid Abdominal Fluid  Gram Stain (01-16-20 @ 04:26):    polymorphonuclear leukocytes seen    No organisms seen    by cytocentrifuge  Final Report (01-20-20 @ 17:00):    No growth at 5 days      EXAM:  CT ABDOMEN AND PELVIS IC    EXAM:  CT ANGIO CHEST (W)AW IC                        PROCEDURE DATE:  01/21/2020    INTERPRETATION:  CLINICAL INFORMATION: Reevaluate pulmonary embolus  COMPARISON: CTA chest 1/13/2020    PROCEDURE:   CT Angiography of the Chest was performed followed by portal venous phase imaging of the Abdomen and Pelvis.  IV Contrast: 90 ml of Omnipaque 350 was injected intravenously. 10 ml were discarded.  Oral contrast: None.  Sagittal and coronal reformats were performed as well as 3D (MIP) reconstructions.  FINDINGS:  CHEST:     There are pulmonary emboli within the right lower lobar arteries and the left lower lobar arteries extending into the distal segments as seen on prior study. The pulmonary emboli seen in the right and left pulmonary arteries are normal longer visualized.    The heart size is normal. No pericardial effusion. There are a few mediastinal lymph nodes. No lymphadenopathy.    No endobronchial lesions. Bilateral wedgelike opacities in the lower lobes. Trace right pleural effusion.    Calcification within the left thyroid lobe. The upper abdomen is unremarkable.    Age indeterminate right-sided rib fractures. Chronic left-sided rib fractures.    ABDOMEN AND PELVIS:    The liver is unremarkable. Mild periportal edema. Vicarious excretion of contrast within the gallbladder. Surgical clips seen at the pancreatic tail. The spleen is not visualized.    The adrenal glands are unremarkable. Subcentimeter low attenuating lesions within the right kidney.    The left kidney is unremarkable. No hydronephrosis. The urinary bladder is collapsed around Martinez catheter balloon. The prostate is normal in size.  Bilateral corpora cavernosum implants.    No bowel obstruction. Normal appendix.    Calcified plaques within the abdominal aorta. Status post infrarenal IVC filter placement. Poor opacification of the IVC limiting evaluation for thrombus surrounding the IVC filter.    There is a right lower quadrant drainage catheter traversing a right lower quadrant fluid collection in the peritoneum. The tip of this changed catheters correlate within the fluid collection, which measures 6.9 x 3.9 cm, previously measuring 9.5 x 5.0 cm.    Anasarca.    Degenerative changes.    IMPRESSION:     Pulmonary emboli within the right lower lobar arteries and left lower lobar arteries extending into the distal segments as seen on prior study. The pulmonary emboli seen in the right and left pulmonary arteries are no longer visualized.    New bilateral wedgelike opacities in the lower lobes, which may represent pulmonary infarcts.    Interval decrease in size in the right lower quadrant fluid collection.        REVIEW OF SYSTEMS  Gen: + weakness   Skin: No rashes  Head/Eyes/Ears/Mouth: No headache; Normal hearing; Normal vision w/o blurriness; No sinus pain/discomfort, sore throat  Respiratory: no SOB  CV: No chest pain, PND, orthopnea  GI: incisional tenderness   : + martinez  MSK: + lower extrem edema, RUE edema  Neuro: No dizziness/lightheadedness, weakness, seizures, numbness, tingling  Heme: No easy bruising or bleeding  Endo: No heat/cold intolerance  Psych: No significant nervousness, anxiety, stress, depression    PHYSICAL EXAM:    Constitutional: NAD  	Eyes: Anicteric, PERRLA  	Respiratory: + crackles  	Cardiovascular: RRR  	Gastrointestinal: Soft abdomen, appropriate incisional TTP, ND.  wound vac ss/murky output, RLQ drain with SS drainage    Genitourinary:  Martinez, anuric, residual hematuria in bag    Extremities: b/l lower ext edema, RUE +2 edema with ace wrapped  	Vascular: palpable DP pulses. L AVF palpable  	Neurological: A&O x3.  	Skin: no new lesions/ulcerations  	Musculoskeletal: Moving all extremities

## 2020-01-22 NOTE — PROGRESS NOTE ADULT - ASSESSMENT
49 year old male with ESRD on HD, now s/p ddrt on 12/8/2019 course complicated by TMA/profound ATN, who presents from a HD center with a fever and cough. He was initially admitted with influenza. He is s/p washout of infected collection and biopsy which revealed ATN.  He was found to have diffusely enlarging perinephric fluid collection.    While on telemetry, he had an episode of a wide complex tachycardia. It initially appears irregular, suggesting an atrial arrhythmia with aberrancy, though the remainder appears more like an episode of non-sustained VT.   s/p washout on 1/1/2020 with worsening sepsis and transfer to ICU, s/p IR drainage of perinephric collection on 1/6. 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 He underwent operative repair of right external iliac artery with pericardial patch, transplant nephrectomy, RLE thrombectomy, and IVC filter on 1/9.     Now s/p corrina ->tachy arrest with tdp, af rvr, now on amio, with large PE    - critical events noted. Now off of pressor support. Bp has improved overall  - developed malignant arrhythmias which seemed to start with bradycardia, leading to more tachycardia and bursts of VT and rapid AF.  The apparent af degenerated into torsades.  He was shocked and has been maintaining SR since  - cont amio at 200 daily. No further events over last 72 hours.  - hold metoprolol in the setting of borderline BP.  - repeat ekg daily to evaluate qtc, while on antifungals/amiodarone  - arrhythmias were most likely triggered by the massive vte event, are unlikely to represent a primary cardiac issue  - hs trop noted, though in the context of his arrhythmias, and right heart strain from the pe, the trop is more likely to reflect demand than an acs event  - ideally would be on asa, statin when able     - cont hep gtt for a/c  - echo results noted, with preserved/hyperdynamic lv, with RV failure consistent with acute RV overload  - CTPA on 1/21 demonstrated some resolution of PE and evolving pulmonary infarcts.   - Further cardiac workup will depend on clinical course.   - will follow with you

## 2020-01-22 NOTE — PROGRESS NOTE ADULT - SUBJECTIVE AND OBJECTIVE BOX
Seen in SICU, denies CP, SOB, fevers noted    Vital Signs Last 24 Hrs  T(C): 37.8 (01-22-20 @ 15:00), Max: 38.7 (01-21-20 @ 19:00)  T(F): 100 (01-22-20 @ 15:00), Max: 101.7 (01-21-20 @ 19:00)  HR: 65 (01-22-20 @ 17:00) (64 - 123)  BP: 99/57 (01-22-20 @ 16:00) (77/41 - 164/67)  BP(mean): 71 (01-22-20 @ 16:00) (54 - 101)  RR: 21 (01-22-20 @ 17:00) (9 - 25)  SpO2: 99% (01-22-20 @ 17:00) (79% - 100%)    Card S1S2  Lungs decr BS b/l  Abd soft  Extr + edema b/l LE R > L, + edema RUE, AVF patent                                                                                                                                                      7.6    19.25 )-----------( 556      ( 22 Jan 2020 00:38 )             23.2     22 Jan 2020 00:38    135    |  96     |  17     ----------------------------<  129    3.3     |  25     |  4.72     Ca    8.0        22 Jan 2020 00:38  Phos  2.5       22 Jan 2020 00:38  Mg     1.8       22 Jan 2020 00:38    TPro  5.2    /  Alb  2.1    /  TBili  1.0    /  DBili  0.6    /  AST  17     /  ALT  5      /  AlkPhos  255    22 Jan 2020 00:38    LIVER FUNCTIONS - ( 22 Jan 2020 00:38 )  Alb: 2.1 g/dL / Pro: 5.2 g/dL / ALK PHOS: 255 U/L / ALT: 5 U/L / AST: 17 U/L / GGT: x           PT/INR - ( 22 Jan 2020 00:38 )   PT: 17.6 sec;   INR: 1.52 ratio      acetaminophen   Tablet .. 975 milliGRAM(s) Oral every 6 hours PRN  allopurinol 100 milliGRAM(s) Oral daily  aMIOdarone    Tablet 200 milliGRAM(s) Oral two times a day  brimonidine 0.2% Ophthalmic Solution 1 Drop(s) Both EYES three times a day  chlorhexidine 2% Cloths 1 Application(s) Topical <User Schedule>  epoetin trey Injectable 65250 Unit(s) IV Push once  gabapentin 100 milliGRAM(s) Oral every 12 hours  heparin  Infusion 1000 Unit(s)/Hr IV Continuous <Continuous>  HYDROmorphone  Injectable 0.5 milliGRAM(s) IV Push every 3 hours PRN  latanoprost 0.005% Ophthalmic Solution 1 Drop(s) Both EYES daily  lidocaine 2% Jelly 1 milliLiter(s) IntraUrethral two times a day PRN  meropenem  IVPB 500 milliGRAM(s) IV Intermittent every 24 hours  micafungin IVPB 100 milliGRAM(s) IV Intermittent every 24 hours  Nephro-candace 1 Tablet(s) Oral daily  oxyCODONE    IR 5 milliGRAM(s) Oral every 4 hours PRN    A/P:    Adm w/Flu A 12/27/19  Hx ESRD on HD, s/p DDRT 12/8/19, DGF  S/p transplant kidney bx 12/13: ATN, focal TMA  S/p PLEX 12/13, 12/15; d/c-d 12/20 w/op HD 3 x wk  Re-adm 12/27, dx w/infected carlos-nephric hematoma   S/p OR 1/1 for washout and repeat renal graft biopsy  Repeat renal bx c/w ATN  Dx w/RLE DVT  Developed gross hematuria  S/p OR 1/10 for infected carlos-nephric hematoma, hemorrhage involving pseudo-aneurysm of anastomosis of renal artery to external iliac vein,   s/p transplant nephrectomy, s/p IVCF   S/p PEA arrest 1/13, intubated, s/p CTA, + PE, R retrop collection, on AC  S/p IR drainage of abd collection  New RUE DVT  Fevers, Abx per ID  HD tomorrow  Fluid removal 2-3kg as tolerates  Epogen w/HD  D/w SICU team

## 2020-01-22 NOTE — PROGRESS NOTE ADULT - ATTENDING COMMENTS
49 yr old man with ESRD s/p DDRT in Dec 2019 with complicated post operative course, now s/p graft nephrectomy on 1/10/20 back on hemodialysis off all immunosuppression.  Also has massive PE/DVT and is on a heparin drip.    1. ESRD on dialysis. IHD was done yesterday with net UF removal of 2L. has anasarca and will benefit from daily IUF/IHD. will d/w primary nephrologist( Dr Amaya)  2. DVT/Saddle PE -  CTA on 1/21/20 : there are pulmonary emboli within the right lower lobar arteries and the left lower lobar arteries extending into the distal segments as seen on prior study. The pulmonary emboli seen in the right and left pulmonary arteries are normal longer visualized.  will continue heparin drip.  3. Bacteremia- had fever last night  and worsening leucocytosis today . CT A/P  on 1/21/19 shows a 6.9 x 3.9cm RLQ fluid collection.   Requested IR to place new catheter lower within RLQ collection to improved drainage. continue Meropenem and Micafungin 100mg daily.  Will likely need long term antibiotics. Consider mildine or PICC line placement.

## 2020-01-22 NOTE — PROGRESS NOTE ADULT - SUBJECTIVE AND OBJECTIVE BOX
Follow Up:  Leukocytosis, Infected Hematoma    Interval History: Febrile overnight. Notes continued mild RLQ pain. No diarrhea.     REVIEW OF SYSTEMS  [  ] ROS unobtainable because:    [ x ] All other systems negative except as noted below    Constitutional:  [x ] fever [ ] chills  [ ] weight loss  [ ] weakness  Skin:  [ ] rash [ ] phlebitis	  Eyes: [ ] icterus [ ] pain  [ ] discharge	  ENMT: [ ] sore throat  [ ] thrush [ ] ulcers [ ] exudates  Respiratory: [ ] dyspnea [ ] hemoptysis [ ] cough [ ] sputum	  Cardiovascular:  [ ] chest pain [ ] palpitations [ ] edema	  Gastrointestinal:  [ ] nausea [ ] vomiting [ ] diarrhea [ ] constipation [ x ] pain	  Genitourinary:  [ ] dysuria [ ] frequency [ ] hematuria [ ] discharge [ ] flank pain  [ ] incontinence  Musculoskeletal:  [ ] myalgias [ ] arthralgias [ ] arthritis  [ ] back pain  Neurological:  [ ] headache [ ] seizures  [ ] confusion/altered mental status    Allergies  coconut (Anaphylaxis)  morphine (Pruritus; Rash)        ANTIMICROBIALS:  meropenem  IVPB 500 every 24 hours  micafungin IVPB 100 every 24 hours      OTHER MEDS:  MEDICATIONS  (STANDING):  acetaminophen   Tablet .. 975 every 6 hours PRN  allopurinol 100 daily  aMIOdarone    Tablet 200 two times a day  gabapentin 100 every 12 hours  heparin  Infusion 1000 <Continuous>  HYDROmorphone  Injectable 0.5 every 3 hours PRN  oxyCODONE    IR 5 every 4 hours PRN      Vital Signs Last 24 Hrs  T(C): 37.8 (22 Jan 2020 11:00), Max: 38.7 (21 Jan 2020 19:00)  T(F): 100 (22 Jan 2020 11:00), Max: 101.7 (21 Jan 2020 19:00)  HR: 76 (22 Jan 2020 12:00) (64 - 123)  BP: 117/57 (22 Jan 2020 12:00) (77/41 - 154/73)  BP(mean): 79 (22 Jan 2020 12:00) (54 - 105)  RR: 18 (22 Jan 2020 12:00) (9 - 33)  SpO2: 87% (22 Jan 2020 12:00) (79% - 100%)    PHYSICAL EXAMINATION:  General: Alert and Awake, NAD  HEENT: PERRL, EOMI  Neck: Supple  Cardiac: RRR, No M/R/G  Resp: CTAB, No Wh/Rh/Ra  Abdomen: +RLQ drain with serosanguinous drainage. +RLQ wound vac over transplant nephrectomy site, NBS, mild tenderness to palpation over RLQ  MSK: 1-2+ RLE edema. No Calf tenderness  Skin: No rashes or lesions. Skin is warm and dry to the touch.   Neuro: Alert and Awake. CN 2-12 Grossly intact. Moves all four extremities spontaneously.  Psych: Calm, Pleasant, Cooperative                            7.6    19.25 )-----------( 556      ( 22 Jan 2020 00:38 )             23.2       01-22    135  |  96  |  17  ----------------------------<  129<H>  3.3<L>   |  25  |  4.72<H>    Ca    8.0<L>      22 Jan 2020 00:38  Phos  2.5     01-22  Mg     1.8     01-22    TPro  5.2<L>  /  Alb  2.1<L>  /  TBili  1.0  /  DBili  0.6<H>  /  AST  17  /  ALT  5<L>  /  AlkPhos  255<H>  01-22          MICROBIOLOGY:  Vancomycin Level, Trough: 10.6 ug/mL (01-21-20 @ 21:54)  v  .Blood Blood  01-21-20   No growth to date.  --  --      .Blood Blood  01-21-20   No growth to date.  --  --      .Sputum Sputum, trap  01-18-20   Normal Respiratory Gillian present  --    No polymorphonuclear leukocytes per low power field  Rare Squamous epithelial cells per low power field  Rare Gram positive cocci in pairs per oil power field      .Blood Blood  01-16-20   No growth at 5 days.  --  --      .Body Fluid Abdominal Fluid  01-15-20   No growth at 5 days  --    polymorphonuclear leukocytes seen  No organisms seen  by cytocentrifuge      .Tissue Other  01-10-20   No growth at 5 days  --  Enterobacter cloacae (Carbapenem Resistant)      .Blood Blood-Venous  01-07-20   No growth at 5 days.  --  --      .Urine Catheterized  01-07-20   <10,000 CFU/mL Normal Urogenital Gillian  --  --      Abdominal Fl Abdominal Fluid  01-06-20   No growth at 5 days  --    No polymorphonuclear leukocytes seen  No organisms seen  by cytocentrifuge      .Urine Clean Catch (Midstream)  01-05-20   >100,000 CFU/ml Enterobacter cloacae  --  Enterobacter cloacae      .Blood Blood  01-04-20   No growth at 5 days.  --  --      .Tissue Other  01-01-20   Numerous Enterobacter cloacae complex  --  Enterobacter cloacae complex      .Surgical Swab collection around right kidney  01-01-20   Moderate Enterobacter cloacae complex  --  Enterobacter cloacae complex      .Body Fluid periphrenic collection  01-01-20   **Please Note**: This is a Corrected Report**  Moderate Enterobacter cloacae complex  Moderate Escherichia coli  Previously reported as:  Moderate Enterobacter cloacae (Carbapenem Resistant)  Moderate Escherichia coli  --  Escherichia coli  Enterobacter cloacae complex      .Surgical Swab right kidney abscess  01-01-20   Moderate Enterobacter cloacae complex  --  Enterobacter cloacae complex      .Urine Clean Catch (Midstream)  12-29-19   >100,000 CFU/ml Enterobacter cloacae  --  Enterobacter cloacae      .Blood Blood-Peripheral  12-27-19   No growth at 5 days.  --  --      RADIOLOGY:    <The imaging below has been reviewed and visualized me independently>    EXAM:  CT ABDOMEN AND PELVIS IC                        EXAM:  CT ANGIO CHEST (W)AW IC                        PROCEDURE DATE:  01/21/2020    Pulmonary emboli within the right lower lobar arteries and left lower lobar arteries extending into the distal segments as seen on prior study. The pulmonary emboli seen in the right and left pulmonary arteries are no longer visualized.  New bilateral wedgelike opacities in the lower lobes, which may represent pulmonary infarcts.  Interval decrease in size in the right lower quadrant fluid collection.    EXAM:  XR CHEST PORTABLE URGENT 1V                        PROCEDURE DATE:  01/22/2020    No significant interval change.

## 2020-01-22 NOTE — PROGRESS NOTE ADULT - ASSESSMENT
49M PMHx of ESRD on HD  ( via LUE AVF), HTN, Gout, Glaucoma, NET of pancreas, RA with hand contractures. s/p DDRT 12/8/19,  Post op course c/b DGF requiring HD. Renal bx x 2 c/w profound ATN.     Re-admitted with Influenza and perinephric hematoma. s/p hematoma evacuation/ abd washout and renal biopsy. Post op course c/b leukocytosis, infected hematoma, acute RLE DVT, and bleeding pseudoaneurysm at txp renal artery anastomosis. Now s/p graft nephrectomy. Course further complicated by saddle PE causing right heart strain and hemodynamic instability leading to cardiac arrest requiring ACLS, now with ROSC and intubated    [] DDRT c/b DGF/ATN/perinephric infected hematoma/pseudoneurysm/graft nephrectomy   - HD per nephrology team.   - s/p bedside IR drainage of RLQ fluid collection (1/15), Cx's: NGTD  - 1/21 CT scan showing 6.9 x 3.9cm RLQ fluid collection. Febrile 38.7   at 7Pm yesterday with worsening leukocytosis today.  Requested IR to place new catheter lower within RLQ collection to improved drainage.   - ID following;  cont Meropenem, Micafungin 100mg daily.  Will likely need long term antibiotics. Consider mildine or PICc placement  - DC Amikacin and Vancomycin  - pain control  - bowel regimen  - PT/OT/OOB.  VAC care  - Keep B/L LE bandaged with ace wrap    [] Saddle PE, with right heart strain and complicated by PEA arrest followed by T[place]ade, ROSC achieved after ACLS  - vascular, CT and vascular cardiology following   - CT 1/21: There are pulmonary emboli within the right lower lobar arteries and the left lower lobar arteries extending into the distal segments as seen on prior study. The pulmonary emboli seen in the right and left pulmonary arteries are normal longer visualized. New distal to mid brachial DVT noted  1/19 Duplex: RUE Deep vein thrombosis in the mid to distal brachial vein.  - Continue Heparin drip. Consider switching to NOAC in the future   - repeat Echo reviewed     [] New acute DVT of R distal brachia vein   - continue anticoagulation     [] New acute DVT of R external iliac, common femoral, femoral veins, R soleal vein requiring heparin drip  - IVC filter placed 1/9, on heparin drip     [] Dispo  - continue SICU care

## 2020-01-22 NOTE — PROGRESS NOTE ADULT - SUBJECTIVE AND OBJECTIVE BOX
Vascular & Interventional Radiology Post-Procedure Note    Pre-Procedure Diagnosis: Post op collection  Post-Procedure Diagnosis: Same as pre.  Indications for Procedure: residual collection.    Attending: Dr. Bro   Resident: Dr. Bryant    Procedure Details/Findings: successful reposition of drainage catheter and exchange for 12F   Access (if applicable): existing    Complications: none  Estimated Blood Loss: Minimal  Specimen: sent for Cx  Contrast: none  Sedation: yes  Patient Condition/Disposition: stable, back to SICU    Plan:   - monitor output.   - Flush with 5cc NS q12  - f/u culture

## 2020-01-22 NOTE — PROGRESS NOTE ADULT - SUBJECTIVE AND OBJECTIVE BOX
SURGICAL INTENSIVE CARE UNIT DAILY PROGRESS NOTE    24 HOUR EVENTS:   -- S/p CT PA, abdomen and pelvis with findings concerning for new pulmonary infarcts. Interval decrease in size of fluid collection in iliac fossa.  -- S/p hemodialysis, net ultrafiltrate of 2.7 L removed. Subsequent hypotension, asymptomatic, and self-limiting.    HPI:  49M PMHx of ESRD on HD  ( via LUE AVF), HTN, Gout, Glaucoma, NET of pancreas (s/p robotic distal panc/splenectomy at [place] 2015 by Dr. Young, followed by Dr. Raphael, [facility] Oncologist), RA with hand contractures. 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 Post op course c/b DGF requiring HD, thrombocytopenia, elevated LDH and Haptoglobulin. Schistocytes  on peripheral smear concerning for TMA. Envarsus held. Received Belatacept 12/13. Started on PLEX (12/12, 12/15). Underwent renal bx on 12/13 c/w profound ATN.  Found to have much improvement to levels, likely related to Tacrolimus. He was discharged home on 12/20/19 w/ outpatient HD.     He was now sent to ED by dialysis center after he was found to be febrile to 102, he did not get HD this AM, and last full HD session was on 12/26. Upon arrival, he was febrile to 101.7, other vitals were stable. He states that he had a cough that began 5-6 days ago that has now mostly resolved. He reports no other febrile episodes other than this morning. He admits to having one SOB episode earlier this week that resolved after he got HD. He also states that his mother was hospitalized earlier this week and was flu+. Currently makes less than 1 cup urine/daily. He currently denies SOB, CP, dyspnea, HA, dizziness, N/V, diarrhea, constipation.     Recipient info:   CPRA:    0%  ABO:      A+  CMVAb:  Positive  EBVIgG Status:  Positive  Last HD:  12/7/2019    Donor ID:  [id number]  Match: [id number]  OPO: NYRT  Age:  49  ABO:  A  KDPI 81%  COD:  Anoxia  X Clamp Time:  12/7/2019 1538.  Medical Hx: DM, HTN, HLD, obesity BMI 53, CAD, CABAGX3  Terminal Cr:  1/1.8/1.7  CMV- Neg EBV- Neg    OR:    DDRT to right iliac fossa. Simulect induction. Two arteries anastomosed to a single cuff on the back table. One vein, one ureter. Ureteral anastomosis performed over a double J stent, which was sutured to the Kennedy.   Cold ischemia time 25.5 hours. (27 Dec 2019 10:45)      NEURO  A&O x3.    RESPIRATORY  RR: 13 (01-22-20 @ 03:00) (12 - 33)  SpO2: 98% (01-22-20 @ 03:00) (92% - 100%)  Wt(kg): --  Mechanical Ventilation:       CARDIOVASCULAR  HR: 69 (01-22-20 @ 03:00) (62 - 80)  BP: 87/52 (01-22-20 @ 03:00) (77/41 - 145/71)  BP(mean): 64 (01-22-20 @ 03:00) (54 - 105)  ABP: --  ABP(mean): --  Wt(kg): --  CVP(cm H2O): --  VBG - ( 22 Jan 2020 02:03 )  pH: 7.46  /  pCO2: 42    /  pO2: 40    / HCO3: 30    / Base Excess: 6.0   /  SaO2: 77     Lactate: 1.2        GI/NUTRITION  Diet: Renal diet.    GENITOURINARY  I&O's Detail    01-20 @ 07:01  -  01-21 @ 07:00  --------------------------------------------------------  IN:    dextrose 5% + sodium chloride 0.9%: 90 mL    heparin Infusion: 322 mL    Oral Fluid: 360 mL    Solution: 250 mL  Total IN: 1022 mL    OUT:    Bulb: 160 mL  Total OUT: 160 mL    Total NET: 862 mL      01-21 @ 07:01  -  01-22 @ 04:06  --------------------------------------------------------  IN:    heparin Infusion: 266 mL    Oral Fluid: 500 mL    Other: 700 mL    Solution: 100 mL  Total IN: 1566 mL    OUT:    Bulb: 120 mL    Other: 2700 mL  Total OUT: 2820 mL    Total NET: -1254 mL          01-22    135  |  96  |  17  ----------------------------<  129<H>  3.3<L>   |  25  |  4.72<H>    Ca    8.0<L>      22 Jan 2020 00:38  Phos  2.5     01-22  Mg     1.8     01-22    TPro  5.2<L>  /  Alb  2.1<L>  /  TBili  1.0  /  DBili  0.6<H>  /  AST  17  /  ALT  5<L>  /  AlkPhos  255<H>  01-22      HEMATOLOGIC                        7.6    19.25 )-----------( 556      ( 22 Jan 2020 00:38 )             23.2     PT/INR - ( 22 Jan 2020 00:38 )   PT: 17.6 sec;   INR: 1.52 ratio      PTT - ( 22 Jan 2020 00:38 )  PTT:76.8 sec    INFECTIOUS DISEASES  RECENT CULTURES:  Specimen Source: .Blood Blood  Date/Time: 01-21 @ 03:02  Culture Results:   No growth to date.  Gram Stain: --  Organism: --  Specimen Source: .Sputum Sputum, trap  Date/Time: 01-18 @ 15:26  Culture Results:   Normal Respiratory Gillian present  Gram Stain:   No polymorphonuclear leukocytes per low power field  Rare Squamous epithelial cells per low power field  Rare Gram positive cocci in pairs per oil power field  Organism: --      ENDOCRINE  CAPILLARY BLOOD GLUCOSE  Blood Gas Venous - Glucose (01.22.20 @ 02:03) Blood Gas Venous - Glucose: 112 mg/dL              IMAGING:  < from: CT Angio Chest w/ IV Cont (01.21.20 @ 11:43) >  CHEST:   There are pulmonary emboli within the right lower lobar arteries and the left lower lobar arteries extending into the distal segments as seen on prior study. The pulmonary emboli seen in the right and left pulmonary arteries are normal longer visualized.  The heart size is normal. No pericardial effusion. There are a few mediastinal lymph nodes. No lymphadenopathy.  No endobronchial lesions. Bilateral wedgelike opacities in the lower lobes. Trace right pleural effusion.  Calcification within the left thyroid lobe. The upper abdomen is unremarkable.  Age indeterminate right-sided rib fractures. Chronic left-sided rib fractures.    ABDOMEN AND PELVIS:  The liver is unremarkable. Mild periportal edema. Vicarious excretion of contrast within the gallbladder. Surgical clips seen at the pancreatic tail. The spleen is not visualized.  The adrenal glands are unremarkable. Subcentimeter low attenuating lesions within the right kidney.  The left kidney is unremarkable. No hydronephrosis. The urinary bladder is collapsed around Kennedy catheter balloon. The prostate is normal in size.  Bilateral corpora cavernosum implants.  No bowel obstruction. Normal appendix.  Calcified plaques within the abdominal aorta. Status post infrarenal IVC filter placement. Poor opacification of the IVC limiting evaluation for thrombus surrounding the IVC filter.  There is a right lower quadrant drainage catheter traversing a right lower quadrant fluid collection in the peritoneum. The tip of this changed catheters correlate within the fluid collection, which measures 6.9 x 3.9 cm, previously measuring 9.5 x 5.0 cm.  Anasarca.  Degenerative changes.    IMPRESSION:   Pulmonary emboli within the right lower lobar arteries and left lower lobar arteries extending into the distal segments as seen on prior study. The pulmonary emboli seen in the right and left pulmonary arteries are no longer visualized.  New bilateral wedgelike opacities in the lower lobes, which may represent pulmonary infarcts.  Interval decrease in size in the right lower quadrant fluid collection.    < end of copied text >

## 2020-01-22 NOTE — PROGRESS NOTE ADULT - SUBJECTIVE AND OBJECTIVE BOX
VASCULAR SURGERY DAILY PROGRESS NOTE:    Subjective:  Patient seen and examined at bedside, patient without complaints.     24 HOUR EVENTS PER SICU:    - S/p CT PA, abdomen and pelvis with findings concerning for new pulmonary infarcts. Interval decrease in size of fluid collection in iliac fossa.  -S/p hemodialysis, net ultrafiltrate of 2.7 L removed. Subsequent hypotension, asymptomatic, and self-limiting.    Objective:  Vital Signs Last 24 Hrs  T(C): 37.1 (22 Jan 2020 03:00), Max: 38.7 (21 Jan 2020 19:00)  T(F): 98.8 (22 Jan 2020 03:00), Max: 101.7 (21 Jan 2020 19:00)  HR: 123 (22 Jan 2020 06:00) (63 - 123)  BP: 125/84 (22 Jan 2020 06:00) (77/41 - 145/71)  BP(mean): 100 (22 Jan 2020 06:00) (54 - 105)  RR: 16 (22 Jan 2020 06:00) (9 - 33)  SpO2: 97% (22 Jan 2020 06:00) (92% - 100%)    I&O's Detail    21 Jan 2020 07:01  -  22 Jan 2020 07:00  --------------------------------------------------------  IN:    heparin Infusion: 308 mL    Oral Fluid: 500 mL    Other: 700 mL    Solution: 150 mL  Total IN: 1658 mL    OUT:    Bulb: 170 mL    Other: 2700 mL  Total OUT: 2870 mL    Total NET: -1212 mL          MEDICATIONS  (STANDING):  allopurinol 100 milliGRAM(s) Oral daily  aMIOdarone    Tablet 200 milliGRAM(s) Oral two times a day  brimonidine 0.2% Ophthalmic Solution 1 Drop(s) Both EYES three times a day  chlorhexidine 2% Cloths 1 Application(s) Topical <User Schedule>  gabapentin 100 milliGRAM(s) Oral every 12 hours  heparin  Infusion 1000 Unit(s)/Hr (14 mL/Hr) IV Continuous <Continuous>  latanoprost 0.005% Ophthalmic Solution 1 Drop(s) Both EYES daily  meropenem  IVPB 500 milliGRAM(s) IV Intermittent every 24 hours  micafungin IVPB 100 milliGRAM(s) IV Intermittent every 24 hours  Nephro-candace 1 Tablet(s) Oral daily  vancomycin  IVPB 500 milliGRAM(s) IV Intermittent <User Schedule>    MEDICATIONS  (PRN):  acetaminophen   Tablet .. 975 milliGRAM(s) Oral every 6 hours PRN Mild Pain (1 - 3)  HYDROmorphone  Injectable 0.5 milliGRAM(s) IV Push every 3 hours PRN Severe Pain (7 - 10)  lidocaine 2% Jelly 1 milliLiter(s) IntraUrethral two times a day PRN irritation  oxyCODONE    IR 5 milliGRAM(s) Oral every 4 hours PRN Moderate Pain (4 - 6)                            7.6    19.25 )-----------( 556      ( 22 Jan 2020 00:38 )             23.2       01-22    135  |  96  |  17  ----------------------------<  129<H>  3.3<L>   |  25  |  4.72<H>    Ca    8.0<L>      22 Jan 2020 00:38  Phos  2.5     01-22  Mg     1.8     01-22    TPro  5.2<L>  /  Alb  2.1<L>  /  TBili  1.0  /  DBili  0.6<H>  /  AST  17  /  ALT  5<L>  /  AlkPhos  255<H>  01-22        Physical Exam  Constitutional: NAD  Respiratory: breathing comfortably on RA  Abd: soft, NT, ND, RLQ drain with minimal serosang output, VAC in place  Ext: weeping b/l LE edema, +DP palpable b/l

## 2020-01-23 LAB
ALBUMIN SERPL ELPH-MCNC: 1.7 G/DL — LOW (ref 3.3–5)
ALBUMIN SERPL ELPH-MCNC: 2.1 G/DL — LOW (ref 3.3–5)
ALP SERPL-CCNC: 243 U/L — HIGH (ref 40–120)
ALP SERPL-CCNC: 267 U/L — HIGH (ref 40–120)
ALT FLD-CCNC: 6 U/L — LOW (ref 10–45)
ALT FLD-CCNC: <5 U/L — LOW (ref 10–45)
ANION GAP SERPL CALC-SCNC: 11 MMOL/L — SIGNIFICANT CHANGE UP (ref 5–17)
ANION GAP SERPL CALC-SCNC: 14 MMOL/L — SIGNIFICANT CHANGE UP (ref 5–17)
APTT BLD: 60.6 SEC — HIGH (ref 27.5–36.3)
APTT BLD: 76.2 SEC — HIGH (ref 27.5–36.3)
AST SERPL-CCNC: 14 U/L — SIGNIFICANT CHANGE UP (ref 10–40)
AST SERPL-CCNC: 19 U/L — SIGNIFICANT CHANGE UP (ref 10–40)
BILIRUB DIRECT SERPL-MCNC: 0.7 MG/DL — HIGH (ref 0–0.2)
BILIRUB INDIRECT FLD-MCNC: 0.4 MG/DL — SIGNIFICANT CHANGE UP (ref 0.2–1)
BILIRUB SERPL-MCNC: 1 MG/DL — SIGNIFICANT CHANGE UP (ref 0.2–1.2)
BILIRUB SERPL-MCNC: 1.1 MG/DL — SIGNIFICANT CHANGE UP (ref 0.2–1.2)
BUN SERPL-MCNC: 14 MG/DL — SIGNIFICANT CHANGE UP (ref 7–23)
BUN SERPL-MCNC: 24 MG/DL — HIGH (ref 7–23)
CALCIUM SERPL-MCNC: 8.3 MG/DL — LOW (ref 8.4–10.5)
CALCIUM SERPL-MCNC: 8.5 MG/DL — SIGNIFICANT CHANGE UP (ref 8.4–10.5)
CHLORIDE SERPL-SCNC: 93 MMOL/L — LOW (ref 96–108)
CHLORIDE SERPL-SCNC: 96 MMOL/L — SIGNIFICANT CHANGE UP (ref 96–108)
CO2 SERPL-SCNC: 24 MMOL/L — SIGNIFICANT CHANGE UP (ref 22–31)
CO2 SERPL-SCNC: 26 MMOL/L — SIGNIFICANT CHANGE UP (ref 22–31)
CREAT SERPL-MCNC: 4.21 MG/DL — HIGH (ref 0.5–1.3)
CREAT SERPL-MCNC: 6.4 MG/DL — HIGH (ref 0.5–1.3)
GLUCOSE SERPL-MCNC: 107 MG/DL — HIGH (ref 70–99)
GLUCOSE SERPL-MCNC: 94 MG/DL — SIGNIFICANT CHANGE UP (ref 70–99)
GRAM STN FLD: SIGNIFICANT CHANGE UP
HCT VFR BLD CALC: 22.7 % — LOW (ref 39–50)
HCT VFR BLD CALC: 23.8 % — LOW (ref 39–50)
HGB BLD-MCNC: 7.1 G/DL — LOW (ref 13–17)
HGB BLD-MCNC: 7.6 G/DL — LOW (ref 13–17)
INR BLD: 1.38 RATIO — HIGH (ref 0.88–1.16)
MAGNESIUM SERPL-MCNC: 2 MG/DL — SIGNIFICANT CHANGE UP (ref 1.6–2.6)
MAGNESIUM SERPL-MCNC: 2.2 MG/DL — SIGNIFICANT CHANGE UP (ref 1.6–2.6)
MCHC RBC-ENTMCNC: 29 PG — SIGNIFICANT CHANGE UP (ref 27–34)
MCHC RBC-ENTMCNC: 29.2 PG — SIGNIFICANT CHANGE UP (ref 27–34)
MCHC RBC-ENTMCNC: 31.3 GM/DL — LOW (ref 32–36)
MCHC RBC-ENTMCNC: 31.9 GM/DL — LOW (ref 32–36)
MCV RBC AUTO: 90.8 FL — SIGNIFICANT CHANGE UP (ref 80–100)
MCV RBC AUTO: 93.4 FL — SIGNIFICANT CHANGE UP (ref 80–100)
NRBC # BLD: 0 /100 WBCS — SIGNIFICANT CHANGE UP (ref 0–0)
NRBC # BLD: 0 /100 WBCS — SIGNIFICANT CHANGE UP (ref 0–0)
PHOSPHATE SERPL-MCNC: 2.2 MG/DL — LOW (ref 2.5–4.5)
PHOSPHATE SERPL-MCNC: 3.2 MG/DL — SIGNIFICANT CHANGE UP (ref 2.5–4.5)
PLATELET # BLD AUTO: 670 K/UL — HIGH (ref 150–400)
PLATELET # BLD AUTO: 679 K/UL — HIGH (ref 150–400)
POTASSIUM SERPL-MCNC: 3.4 MMOL/L — LOW (ref 3.5–5.3)
POTASSIUM SERPL-MCNC: 3.7 MMOL/L — SIGNIFICANT CHANGE UP (ref 3.5–5.3)
POTASSIUM SERPL-SCNC: 3.4 MMOL/L — LOW (ref 3.5–5.3)
POTASSIUM SERPL-SCNC: 3.7 MMOL/L — SIGNIFICANT CHANGE UP (ref 3.5–5.3)
PROT SERPL-MCNC: 5.1 G/DL — LOW (ref 6–8.3)
PROT SERPL-MCNC: 5.6 G/DL — LOW (ref 6–8.3)
PROTHROM AB SERPL-ACNC: 16 SEC — HIGH (ref 10–12.9)
RAPID RVP RESULT: SIGNIFICANT CHANGE UP
RBC # BLD: 2.43 M/UL — LOW (ref 4.2–5.8)
RBC # BLD: 2.62 M/UL — LOW (ref 4.2–5.8)
RBC # FLD: 16.4 % — HIGH (ref 10.3–14.5)
RBC # FLD: 16.9 % — HIGH (ref 10.3–14.5)
SODIUM SERPL-SCNC: 130 MMOL/L — LOW (ref 135–145)
SODIUM SERPL-SCNC: 134 MMOL/L — LOW (ref 135–145)
SPECIMEN SOURCE: SIGNIFICANT CHANGE UP
WBC # BLD: 14.79 K/UL — HIGH (ref 3.8–10.5)
WBC # BLD: 18.46 K/UL — HIGH (ref 3.8–10.5)
WBC # FLD AUTO: 14.79 K/UL — HIGH (ref 3.8–10.5)
WBC # FLD AUTO: 18.46 K/UL — HIGH (ref 3.8–10.5)

## 2020-01-23 PROCEDURE — 99233 SBSQ HOSP IP/OBS HIGH 50: CPT | Mod: GC

## 2020-01-23 PROCEDURE — 99231 SBSQ HOSP IP/OBS SF/LOW 25: CPT

## 2020-01-23 PROCEDURE — 99232 SBSQ HOSP IP/OBS MODERATE 35: CPT

## 2020-01-23 PROCEDURE — 99233 SBSQ HOSP IP/OBS HIGH 50: CPT

## 2020-01-23 PROCEDURE — 99232 SBSQ HOSP IP/OBS MODERATE 35: CPT | Mod: GC

## 2020-01-23 RX ORDER — APIXABAN 2.5 MG/1
2.5 TABLET, FILM COATED ORAL EVERY 12 HOURS
Refills: 0 | Status: DISCONTINUED | OUTPATIENT
Start: 2020-01-23 | End: 2020-01-24

## 2020-01-23 RX ORDER — MICAFUNGIN SODIUM 100 MG/1
100 INJECTION, POWDER, LYOPHILIZED, FOR SOLUTION INTRAVENOUS EVERY 24 HOURS
Refills: 0 | Status: DISCONTINUED | OUTPATIENT
Start: 2020-01-23 | End: 2020-02-09

## 2020-01-23 RX ORDER — MEROPENEM 1 G/30ML
500 INJECTION INTRAVENOUS EVERY 12 HOURS
Refills: 0 | Status: DISCONTINUED | OUTPATIENT
Start: 2020-01-23 | End: 2020-02-04

## 2020-01-23 RX ADMIN — LATANOPROST 1 DROP(S): 0.05 SOLUTION/ DROPS OPHTHALMIC; TOPICAL at 11:07

## 2020-01-23 RX ADMIN — APIXABAN 2.5 MILLIGRAM(S): 2.5 TABLET, FILM COATED ORAL at 11:44

## 2020-01-23 RX ADMIN — Medication 1 TABLET(S): at 11:06

## 2020-01-23 RX ADMIN — Medication 975 MILLIGRAM(S): at 20:14

## 2020-01-23 RX ADMIN — Medication 975 MILLIGRAM(S): at 19:44

## 2020-01-23 RX ADMIN — AMIODARONE HYDROCHLORIDE 200 MILLIGRAM(S): 400 TABLET ORAL at 05:01

## 2020-01-23 RX ADMIN — OXYCODONE HYDROCHLORIDE 5 MILLIGRAM(S): 5 TABLET ORAL at 12:15

## 2020-01-23 RX ADMIN — HYDROMORPHONE HYDROCHLORIDE 0.5 MILLIGRAM(S): 2 INJECTION INTRAMUSCULAR; INTRAVENOUS; SUBCUTANEOUS at 23:47

## 2020-01-23 RX ADMIN — HYDROMORPHONE HYDROCHLORIDE 0.5 MILLIGRAM(S): 2 INJECTION INTRAMUSCULAR; INTRAVENOUS; SUBCUTANEOUS at 17:45

## 2020-01-23 RX ADMIN — HYDROMORPHONE HYDROCHLORIDE 0.5 MILLIGRAM(S): 2 INJECTION INTRAMUSCULAR; INTRAVENOUS; SUBCUTANEOUS at 08:45

## 2020-01-23 RX ADMIN — MEROPENEM 100 MILLIGRAM(S): 1 INJECTION INTRAVENOUS at 17:48

## 2020-01-23 RX ADMIN — HEPARIN SODIUM 14 UNIT(S)/HR: 5000 INJECTION INTRAVENOUS; SUBCUTANEOUS at 01:15

## 2020-01-23 RX ADMIN — CHLORHEXIDINE GLUCONATE 1 APPLICATION(S): 213 SOLUTION TOPICAL at 05:01

## 2020-01-23 RX ADMIN — AMIODARONE HYDROCHLORIDE 200 MILLIGRAM(S): 400 TABLET ORAL at 17:47

## 2020-01-23 RX ADMIN — BRIMONIDINE TARTRATE 1 DROP(S): 2 SOLUTION/ DROPS OPHTHALMIC at 05:01

## 2020-01-23 RX ADMIN — HYDROMORPHONE HYDROCHLORIDE 0.5 MILLIGRAM(S): 2 INJECTION INTRAMUSCULAR; INTRAVENOUS; SUBCUTANEOUS at 05:45

## 2020-01-23 RX ADMIN — APIXABAN 2.5 MILLIGRAM(S): 2.5 TABLET, FILM COATED ORAL at 17:47

## 2020-01-23 RX ADMIN — MICAFUNGIN SODIUM 105 MILLIGRAM(S): 100 INJECTION, POWDER, LYOPHILIZED, FOR SOLUTION INTRAVENOUS at 15:18

## 2020-01-23 RX ADMIN — OXYCODONE HYDROCHLORIDE 5 MILLIGRAM(S): 5 TABLET ORAL at 12:45

## 2020-01-23 RX ADMIN — Medication 1 MILLILITER(S): at 05:31

## 2020-01-23 RX ADMIN — GABAPENTIN 100 MILLIGRAM(S): 400 CAPSULE ORAL at 17:47

## 2020-01-23 RX ADMIN — BRIMONIDINE TARTRATE 1 DROP(S): 2 SOLUTION/ DROPS OPHTHALMIC at 21:32

## 2020-01-23 RX ADMIN — GABAPENTIN 100 MILLIGRAM(S): 400 CAPSULE ORAL at 05:01

## 2020-01-23 RX ADMIN — OXYCODONE HYDROCHLORIDE 5 MILLIGRAM(S): 5 TABLET ORAL at 01:45

## 2020-01-23 RX ADMIN — HYDROMORPHONE HYDROCHLORIDE 0.5 MILLIGRAM(S): 2 INJECTION INTRAMUSCULAR; INTRAVENOUS; SUBCUTANEOUS at 18:00

## 2020-01-23 RX ADMIN — HYDROMORPHONE HYDROCHLORIDE 0.5 MILLIGRAM(S): 2 INJECTION INTRAMUSCULAR; INTRAVENOUS; SUBCUTANEOUS at 05:32

## 2020-01-23 RX ADMIN — BRIMONIDINE TARTRATE 1 DROP(S): 2 SOLUTION/ DROPS OPHTHALMIC at 13:06

## 2020-01-23 RX ADMIN — Medication 100 MILLIGRAM(S): at 11:06

## 2020-01-23 RX ADMIN — ERYTHROPOIETIN 10000 UNIT(S): 10000 INJECTION, SOLUTION INTRAVENOUS; SUBCUTANEOUS at 11:11

## 2020-01-23 RX ADMIN — OXYCODONE HYDROCHLORIDE 5 MILLIGRAM(S): 5 TABLET ORAL at 01:15

## 2020-01-23 RX ADMIN — HYDROMORPHONE HYDROCHLORIDE 0.5 MILLIGRAM(S): 2 INJECTION INTRAMUSCULAR; INTRAVENOUS; SUBCUTANEOUS at 08:30

## 2020-01-23 NOTE — CHART NOTE - NSCHARTNOTEFT_GEN_A_CORE
Nutrition Follow Up Note    Patient seen for: LOS follow up in 8ICU    Source: pt    Chart reviewed, events noted. Pt S/P DRRT in 2019, S/P transplant nephrectomy, course complications noted. Pt continues to be on HD. Noted pt c VAC in place.     Diet : renal diet c Nepro x 2 daily     Pt reports he has an appetite and has been trying to eat, however he is frustrated by the many interruptions during meals. States he did try to have some of his lunch during dialysis but then was not able to finish, noted per chart and HD RN, patient's pressure did go down slightly while he was trying to eat while on dialysis. Pt at first reported Nepro gives him diarrhea and he no longer wants it. Asked for Liquicell protein supplement, unfortunately supplement is not available in house. Pt was willing to try No Carb Prosource but then asked for berry flavor of Nepro, will honor flavor change request. Noted last BM . Pt denies any other GI distress at this time       Daily Weight in k.1 (), Weight in k.3 (), Weight in k.3 (), Weight in k.9 (), Weight in k.9 (), Weight in k.8 (), Weight in k (); noted pt c edema and on HD, would monitor wt     Drug Dosing Weight  Weight (kg): 63.5 (2020 20:33)  BMI (kg/m2): 19.5 (2020 20:33)      Pertinent Medications: MEDICATIONS  (STANDING):  allopurinol 100 milliGRAM(s) Oral daily  aMIOdarone    Tablet 200 milliGRAM(s) Oral two times a day  apixaban 2.5 milliGRAM(s) Oral every 12 hours  brimonidine 0.2% Ophthalmic Solution 1 Drop(s) Both EYES three times a day  chlorhexidine 2% Cloths 1 Application(s) Topical <User Schedule>  epoetin trey Injectable 57112 Unit(s) IV Push <User Schedule>  gabapentin 100 milliGRAM(s) Oral every 12 hours  latanoprost 0.005% Ophthalmic Solution 1 Drop(s) Both EYES daily  meropenem  IVPB 500 milliGRAM(s) IV Intermittent every 12 hours  micafungin IVPB 100 milliGRAM(s) IV Intermittent every 24 hours  Nephro-candace 1 Tablet(s) Oral daily    MEDICATIONS  (PRN):  acetaminophen   Tablet .. 975 milliGRAM(s) Oral every 6 hours PRN Mild Pain (1 - 3)  HYDROmorphone  Injectable 0.5 milliGRAM(s) IV Push every 3 hours PRN Severe Pain (7 - 10)  lidocaine 2% Jelly 1 milliLiter(s) IntraUrethral two times a day PRN irritation  oxyCODONE    IR 5 milliGRAM(s) Oral every 4 hours PRN Moderate Pain (4 - 6)      LABS:    @ 01:14: Sodium 130<L>, Potassium 3.4<L>, Chloride 93<L>, Calcium 8.5, Magnesium 2.2, Phosphorus 3.2, BUN 24<H>, Creatinine 6.40<H>, BG 94, Alk Phos 243<H>, ALT/SGPT <5<L>, AST/SGOT 14, HbA1c --, Total Protein 5.6<L>, Albumin 2.1<L>, Prealbumin --, Total Bilirubin 1.1, Direct Bilirubin 0.7<H>, Hemoglobin 7.6<L>, Hematocrit 23.8<L>      Skin per nursing documentation: no pressure injuries   Edema: +1 generalized, +2 jolynn. leg, scrotum, right arm     Estimated Needs:   [x] no change since previous assessment      Previous Nutrition Diagnosis: increased nutrient needs and limited adherence to nutrition related recommendations   Nutrition Diagnosis addressed c current diet and supplements, reviewed importance of adequate protein intake and adherence to diet     New Nutrition Diagnosis: none at this time       Recommend  1) Continue c current diet.   2) Continue c Nepro. Will provide flavor preference.  3) Encouraged consumption of Prot first at all meals and reinforced importance of adherence to diet.     Monitoring and Evaluation:     Continue to monitor nutritional intake, tolerance to diet prescription, weights, labs, skin integrity    RD remains available upon request and will follow up per protocol  Aria Fernandez MS RD CDN [facility],  #[phone]

## 2020-01-23 NOTE — PROGRESS NOTE ADULT - SUBJECTIVE AND OBJECTIVE BOX
SICU DAILY PROGRESS NOTE    49M PMHx of ESRD on HD  ( via LUE AVF), HTN, Gout, Glaucoma, NET of pancreas (s/p robotic distal panc/splenectomy at [place] 2015 by Dr. Young, followed by Dr. Raphael, [facility] Oncologist), RA with hand contractures. 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 Post op course c/b DGF requiring HD, thrombocytopenia, elevated LDH and Haptoglobulin. Schistocytes  on peripheral smear concerning for TMA. Envarsus held. Received Belatacept 12/13. Started on PLEX (12/12, 12/15). Underwent renal bx on 12/13 c/w profound ATN.  Found to have much improvement to levels, likely related to Tacrolimus. He was discharged home on 12/20/19 w/ outpatient HD.     He was now sent to ED by dialysis center after he was found to be febrile to 102, he did not get HD this AM, and last full HD session was on 12/26. Upon arrival, he was febrile to 101.7, other vitals were stable. He states that he had a cough that began 5-6 days ago that has now mostly resolved. He reports no other febrile episodes other than this morning. He admits to having one SOB episode earlier this week that resolved after he got HD. He also states that his mother was hospitalized earlier this week and was flu+. Currently makes less than 1 cup urine/daily. He currently denies SOB, CP, dyspnea, HA, dizziness, N/V, diarrhea, constipation.     24 HOUR EVENTS:  - went to IR, reposition and upsized the drain to 12F  - discontinued vanc and amikacin per Transplant  - resumed Epogen    SUBJECTIVE/ROS:  [x] A ten-point review of systems was otherwise negative except as noted.  [ ] Due to altered mental status/intubation, subjective information were not able to be obtained from the patient. History was obtained, to the extent possible, from review of the chart and collateral sources of information.      NEURO  Exam: awake, alert, oriented  Meds: acetaminophen   Tablet .. 975 milliGRAM(s) Oral every 6 hours PRN Mild Pain (1 - 3)  gabapentin 100 milliGRAM(s) Oral every 12 hours  HYDROmorphone  Injectable 0.5 milliGRAM(s) IV Push every 3 hours PRN Severe Pain (7 - 10)  oxyCODONE    IR 5 milliGRAM(s) Oral every 4 hours PRN Moderate Pain (4 - 6)    [x] Adequacy of sedation and pain control has been assessed and adjusted      RESPIRATORY  RR: 17 (01-23-20 @ 03:00) (9 - 23)  SpO2: 97% (01-23-20 @ 03:00) (79% - 100%)  Wt(kg): --  Exam: unlabored, clear to auscultation bilaterally      CARDIOVASCULAR  HR: 68 (01-23-20 @ 03:00) (64 - 123)  BP: 99/56 (01-23-20 @ 03:00) (97/56 - 164/67)  BP(mean): 74 (01-23-20 @ 03:00) (71 - 100)  ABP: --  ABP(mean): --  Wt(kg): --  CVP(cm H2O): --  VBG - ( 22 Jan 2020 02:03 )  pH: 7.46  /  pCO2: 42    /  pO2: 40    / HCO3: 30    / Base Excess: 6.0   /  SaO2: 77     Lactate: 1.2      Exam: regular rate and rhythm  Cardiac Rhythm: sinus  Perfusion     [x]Adequate   [ ]Inadequate  Mentation   [x]Normal       [ ]Reduced  Extremities  [x]Warm         [ ]Cool  Volume Status [ ]Hypervolemic [x]Euvolemic [ ]Hypovolemic  Meds: aMIOdarone    Tablet 200 milliGRAM(s) Oral two times a day      GI/NUTRITION  Exam: soft, nontender, nondistended, incision C/D/I  Diet: regular    GENITOURINARY  I&O's Detail    01-21 @ 07:01  -  01-22 @ 07:00  --------------------------------------------------------  IN:    heparin Infusion: 322 mL    Oral Fluid: 500 mL    Other: 700 mL    Solution: 150 mL  Total IN: 1672 mL    OUT:    Bulb: 170 mL    Other: 2700 mL  Total OUT: 2870 mL    Total NET: -1198 mL      01-22 @ 07:01  -  01-23 @ 04:21  --------------------------------------------------------  IN:    heparin Infusion: 238 mL    Solution: 50 mL  Total IN: 288 mL    OUT:    Bulb: 70 mL  Total OUT: 70 mL    Total NET: 218 mL    01-23    130<L>  |  93<L>  |  24<H>  ----------------------------<  94  3.4<L>   |  26  |  6.40<H>    Ca    8.5      23 Jan 2020 01:14  Phos  3.2     01-23  Mg     2.2     01-23    TPro  5.6<L>  /  Alb  2.1<L>  /  TBili  1.1  /  DBili  0.7<H>  /  AST  14  /  ALT  <5<L>  /  AlkPhos  243<H>  01-23    Meds: Nephro-candace 1 Tablet(s) Oral daily    HEMATOLOGIC  Meds: heparin  Infusion 1000 Unit(s)/Hr IV Continuous <Continuous>    [x] VTE Prophylaxis                        7.6    18.46 )-----------( 679      ( 23 Jan 2020 01:14 )             23.8     PT/INR - ( 23 Jan 2020 01:14 )   PT: 16.0 sec;   INR: 1.38 ratio         PTT - ( 23 Jan 2020 01:14 )  PTT:76.2 sec  Transfusion     [ ] PRBC   [ ] Platelets   [ ] FFP   [ ] Cryoprecipitate      INFECTIOUS DISEASES  WBC Count: 18.46 K/uL (01-23 @ 01:14)    RECENT CULTURES:  Specimen Source: .Body Fluid Abdominal Fluid  Date/Time: 01-22 @ 22:15  Culture Results: --  Gram Stain:   polymorphonuclear leukocytes seen  Gram Negative Rods seen  by cytocentrifuge  Organism: --  Specimen Source: .Blood Blood  Date/Time: 01-21 @ 05:11  Culture Results:   No growth to date.  Gram Stain: --  Organism: --  Specimen Source: .Blood Blood  Date/Time: 01-21 @ 03:02  Culture Results:   No growth to date.  Gram Stain: --  Organism: --  Specimen Source: .Sputum Sputum, trap  Date/Time: 01-18 @ 15:26  Culture Results:   Normal Respiratory Gillian present  Gram Stain:   No polymorphonuclear leukocytes per low power field  Rare Squamous epithelial cells per low power field  Rare Gram positive cocci in pairs per oil power field  Organism: --    Meds: epoetin trey Injectable 00397 Unit(s) IV Push <User Schedule>  meropenem  IVPB 500 milliGRAM(s) IV Intermittent every 24 hours  micafungin IVPB 100 milliGRAM(s) IV Intermittent every 24 hours      ACCESS DEVICES:  [x] Peripheral IV  [ ] Central Venous Line	[ ] R	[ ] L	[ ] IJ	[ ] Fem	[ ] SC	Placed:   [ ] Arterial Line		[ ] R	[ ] L	[ ] Fem	[ ] Rad	[ ] Ax	Placed:   [ ] PICC:					[ ] Mediport  [ ] Urinary Catheter, Date Placed:   [x] Necessity of urinary, arterial, and venous catheters discussed    OTHER MEDICATIONS:  brimonidine 0.2% Ophthalmic Solution 1 Drop(s) Both EYES three times a day  chlorhexidine 2% Cloths 1 Application(s) Topical <User Schedule>  latanoprost 0.005% Ophthalmic Solution 1 Drop(s) Both EYES daily  lidocaine 2% Jelly 1 milliLiter(s) IntraUrethral two times a day PRN      CODE STATUS: full

## 2020-01-23 NOTE — PROGRESS NOTE ADULT - SUBJECTIVE AND OBJECTIVE BOX
VASCULAR SURGERY DAILY PROGRESS NOTE:    Subjective:  Patient seen and examined at bedside, patient without complaints.     24 HOUR EVENTS PER SICU:  - went to IR, reposition and upsized the drain to 12F  - discontinued vanc and amikacin per Transplant  - resumed Epogen      Objective:  Vital Signs Last 24 Hrs  T(C): 37.1 (22 Jan 2020 03:00), Max: 38.7 (21 Jan 2020 19:00)  T(F): 98.8 (22 Jan 2020 03:00), Max: 101.7 (21 Jan 2020 19:00)  HR: 123 (22 Jan 2020 06:00) (63 - 123)  BP: 125/84 (22 Jan 2020 06:00) (77/41 - 145/71)  BP(mean): 100 (22 Jan 2020 06:00) (54 - 105)  RR: 16 (22 Jan 2020 06:00) (9 - 33)  SpO2: 97% (22 Jan 2020 06:00) (92% - 100%)    I&O's Detail    21 Jan 2020 07:01  -  22 Jan 2020 07:00  --------------------------------------------------------  IN:    heparin Infusion: 308 mL    Oral Fluid: 500 mL    Other: 700 mL    Solution: 150 mL  Total IN: 1658 mL    OUT:    Bulb: 170 mL    Other: 2700 mL  Total OUT: 2870 mL    Total NET: -1212 mL          MEDICATIONS  (STANDING):  allopurinol 100 milliGRAM(s) Oral daily  aMIOdarone    Tablet 200 milliGRAM(s) Oral two times a day  brimonidine 0.2% Ophthalmic Solution 1 Drop(s) Both EYES three times a day  chlorhexidine 2% Cloths 1 Application(s) Topical <User Schedule>  gabapentin 100 milliGRAM(s) Oral every 12 hours  heparin  Infusion 1000 Unit(s)/Hr (14 mL/Hr) IV Continuous <Continuous>  latanoprost 0.005% Ophthalmic Solution 1 Drop(s) Both EYES daily  meropenem  IVPB 500 milliGRAM(s) IV Intermittent every 24 hours  micafungin IVPB 100 milliGRAM(s) IV Intermittent every 24 hours  Nephro-candace 1 Tablet(s) Oral daily  vancomycin  IVPB 500 milliGRAM(s) IV Intermittent <User Schedule>    MEDICATIONS  (PRN):  acetaminophen   Tablet .. 975 milliGRAM(s) Oral every 6 hours PRN Mild Pain (1 - 3)  HYDROmorphone  Injectable 0.5 milliGRAM(s) IV Push every 3 hours PRN Severe Pain (7 - 10)  lidocaine 2% Jelly 1 milliLiter(s) IntraUrethral two times a day PRN irritation  oxyCODONE    IR 5 milliGRAM(s) Oral every 4 hours PRN Moderate Pain (4 - 6)                            7.6    19.25 )-----------( 556      ( 22 Jan 2020 00:38 )             23.2       01-22    135  |  96  |  17  ----------------------------<  129<H>  3.3<L>   |  25  |  4.72<H>    Ca    8.0<L>      22 Jan 2020 00:38  Phos  2.5     01-22  Mg     1.8     01-22    TPro  5.2<L>  /  Alb  2.1<L>  /  TBili  1.0  /  DBili  0.6<H>  /  AST  17  /  ALT  5<L>  /  AlkPhos  255<H>  01-22        Physical Exam  Constitutional: NAD  Respiratory: breathing comfortably on RA  Abd: soft, NT, ND, RLQ drain with serosang output, VAC in place  Ext:  b/l LE edema, +DP palpable b/l

## 2020-01-23 NOTE — PROGRESS NOTE ADULT - SUBJECTIVE AND OBJECTIVE BOX
[facility]    Melissa Kelsey, Enrique, Maximino, Marin, Hamzah, Ravi      [phone]    CHIEF COMPLAINT: Patient is a 49y old  Male who presents with a chief complaint of Fever (23 Jan 2020 10:05)      Follow Up: s/p af, torsades/vt arrest, pe    Interim history: The patient reports no new symptoms.  Denies chest discomfort and shortness of breath.  No abdominal pain.  No new neurologic symptoms.      MEDICATIONS  (STANDING):  allopurinol 100 milliGRAM(s) Oral daily  aMIOdarone    Tablet 200 milliGRAM(s) Oral two times a day  apixaban 2.5 milliGRAM(s) Oral every 12 hours  brimonidine 0.2% Ophthalmic Solution 1 Drop(s) Both EYES three times a day  chlorhexidine 2% Cloths 1 Application(s) Topical <User Schedule>  epoetin trey Injectable 55970 Unit(s) IV Push <User Schedule>  gabapentin 100 milliGRAM(s) Oral every 12 hours  latanoprost 0.005% Ophthalmic Solution 1 Drop(s) Both EYES daily  meropenem  IVPB 500 milliGRAM(s) IV Intermittent every 12 hours  micafungin IVPB 100 milliGRAM(s) IV Intermittent every 24 hours  Nephro-candace 1 Tablet(s) Oral daily    MEDICATIONS  (PRN):  acetaminophen   Tablet .. 975 milliGRAM(s) Oral every 6 hours PRN Mild Pain (1 - 3)  HYDROmorphone  Injectable 0.5 milliGRAM(s) IV Push every 3 hours PRN Severe Pain (7 - 10)  lidocaine 2% Jelly 1 milliLiter(s) IntraUrethral two times a day PRN irritation  oxyCODONE    IR 5 milliGRAM(s) Oral every 4 hours PRN Moderate Pain (4 - 6)      REVIEW OF SYSTEMS:  eye, ent, GI, , allergic, dermatologic, musculoskeletal and neurologic are negative except as described above    Vital Signs Last 24 Hrs  T(C): 37.3 (23 Jan 2020 11:02), Max: 37.8 (22 Jan 2020 15:00)  T(F): 99.2 (23 Jan 2020 11:02), Max: 100 (22 Jan 2020 15:00)  HR: 80 (23 Jan 2020 11:02) (65 - 84)  BP: 110/60 (23 Jan 2020 11:02) (97/56 - 164/67)  BP(mean): 85 (23 Jan 2020 11:00) (71 - 96)  RR: 17 (23 Jan 2020 11:02) (12 - 23)  SpO2: 95% (23 Jan 2020 11:02) (87% - 100%)    I&O's Summary    22 Jan 2020 07:01  -  23 Jan 2020 07:00  --------------------------------------------------------  IN: 349 mL / OUT: 120 mL / NET: 229 mL    23 Jan 2020 07:01  -  23 Jan 2020 11:46  --------------------------------------------------------  IN: 42 mL / OUT: 0 mL / NET: 42 mL        Telemetry past 24h: sr    PHYSICAL EXAM:    Constitutional: well-nourished, well-developed, NAD   HEENT:  MMM, sclerae anicteric, conjunctivae clear, no oral cyanosis.  Pulmonary: Non-labored, breath sounds are clear bilaterally, No wheezing, rales or rhonchi  Cardiovascular: Regular, S1 and S2.  1-2/6 apical sys murmur.  No rubs, gallops or clicks  Gastrointestinal: Bowel Sounds present, soft, nontender.   Lymph: jolynn peripheral edema, legs wrapped.   Neurological: Alert, no focal deficits  Skin: No rashes.  Psych:  Mood & affect appropriate    LABS: All Labs Reviewed:                        7.6    18.46 )-----------( 679      ( 23 Jan 2020 01:14 )             23.8                         7.6    19.25 )-----------( 556      ( 22 Jan 2020 00:38 )             23.2                         8.1    17.07 )-----------( 470      ( 21 Jan 2020 00:26 )             25.8     23 Jan 2020 01:14    130    |  93     |  24     ----------------------------<  94     3.4     |  26     |  6.40   22 Jan 2020 00:38    135    |  96     |  17     ----------------------------<  129    3.3     |  25     |  4.72   21 Jan 2020 00:26    131    |  94     |  31     ----------------------------<  100    4.1     |  22     |  7.26     Ca    8.5        23 Jan 2020 01:14  Ca    8.0        22 Jan 2020 00:38  Ca    8.3        21 Jan 2020 00:26  Phos  3.2       23 Jan 2020 01:14  Phos  2.5       22 Jan 2020 00:38  Phos  4.1       21 Jan 2020 00:26  Mg     2.2       23 Jan 2020 01:14  Mg     1.8       22 Jan 2020 00:38  Mg     2.0       21 Jan 2020 00:26    TPro  5.6    /  Alb  2.1    /  TBili  1.1    /  DBili  0.7    /  AST  14     /  ALT  <5     /  AlkPhos  243    23 Jan 2020 01:14  TPro  5.2    /  Alb  2.1    /  TBili  1.0    /  DBili  0.6    /  AST  17     /  ALT  5      /  AlkPhos  255    22 Jan 2020 00:38  TPro  5.3    /  Alb  2.0    /  TBili  1.2    /  DBili  0.7    /  AST  13     /  ALT  6      /  AlkPhos  238    21 Jan 2020 00:26    PT/INR - ( 23 Jan 2020 01:14 )   PT: 16.0 sec;   INR: 1.38 ratio         PTT - ( 23 Jan 2020 07:11 )  PTT:60.6 sec      Blood Culture: Organism --  Gram Stain Blood -- Gram Stain   polymorphonuclear leukocytes seen  Gram Negative Rods seen  by cytocentrifuge  Specimen Source .Body Fluid Abdominal Fluid  Culture-Blood --    Organism --  Gram Stain Blood -- Gram Stain --  Specimen Source .Blood Blood  Culture-Blood --    Organism --  Gram Stain Blood -- Gram Stain --  Specimen Source .Blood Blood  Culture-Blood --    Organism --  Gram Stain Blood -- Gram Stain   No polymorphonuclear leukocytes per low power field  Rare Squamous epithelial cells per low power field  Rare Gram positive cocci in pairs per oil power field  Specimen Source .Sputum Sputum, trap  Culture-Blood --            RADIOLOGY:    EKG:    Echo:

## 2020-01-23 NOTE — PROGRESS NOTE ADULT - PROBLEM SELECTOR PLAN 3
ECHO 1/15/20 - improving right heart strain.   Hemodynamically stable. Continue heparin gtt. ECHO 1/15/20 - improving right heart strain.   Hemodynamically stable. Continue heparin gtt until starting Eliquis 2.5 mg BID.

## 2020-01-23 NOTE — PROGRESS NOTE ADULT - ATTENDING COMMENTS
49 yr old man with ESRD s/p DDRT in Dec 2019 with complicated post operative course, now s/p graft nephrectomy on 1/10/20 back on hemodialysis off all immunosuppression.  Also has massive PE/DVT and is on a heparin drip.    1. ESRD on dialysis. IHD scheduled for today. will benefit from daily IUF/IHD. will d/w primary nephrologist( Dr Amaya)  2. DVT/Saddle PE -  CTA on 1/21/20 : there are pulmonary emboli within the right lower lobar arteries and the left lower lobar arteries extending into the distal segments as seen on prior study. The pulmonary emboli seen in the right and left pulmonary arteries are normal longer visualized.  will switch from heparin drip to Eliquis   3. Bacteremia- CT A/P on 1/21/19 shows a 6.9 x 3.9 cm RLQ fluid collection. s/p replacement and upsizing of tube by IR today. Fluid culture sent, will send fungal cultures. Continue Meropenem and Micafungin 100mg daily. Will likely need long term antibiotics. Consider midine or PICC line placement.

## 2020-01-23 NOTE — PROGRESS NOTE ADULT - SUBJECTIVE AND OBJECTIVE BOX
Interventional Radiology Follow- Up Note    49y Male s/p RLQ drainage catheter evaluation, repositioning and exchange on 1/22  in Interventional Radiology with Dr. Bro.   No complaints offered.    Vitals: T(F): 99 (01-23-20 @ 15:00), Max: 99.8 (01-23-20 @ 03:00)  HR: 69 (01-23-20 @ 16:00) (65 - 85)  BP: 97/52 (01-23-20 @ 16:00) (95/52 - 135/67)  RR: 20 (01-23-20 @ 16:00) (12 - 23)  SpO2: 98% (01-23-20 @ 16:00) (94% - 100%)  Wt(kg): --    LABS:                        7.6    18.46 )-----------( 679      ( 23 Jan 2020 01:14 )             23.8     01-23    130<L>  |  93<L>  |  24<H>  ----------------------------<  94  3.4<L>   |  26  |  6.40<H>    Ca    8.5      23 Jan 2020 01:14  Phos  3.2     01-23  Mg     2.2     01-23    TPro  5.6<L>  /  Alb  2.1<L>  /  TBili  1.1  /  DBili  0.7<H>  /  AST  14  /  ALT  <5<L>  /  AlkPhos  243<H>  01-23    PT/INR - ( 23 Jan 2020 01:14 )   PT: 16.0 sec;   INR: 1.38 ratio         PTT - ( 23 Jan 2020 07:11 )  PTT:60.6 sec      Culture - Body Fluid with Gram Stain (collected 01-22-20 @ 22:15)  Source: .Body Fluid Abdominal Fluid  Gram Stain (01-23-20 @ 03:51):    polymorphonuclear leukocytes seen    Gram Negative Rods seen    by cytocentrifuge      PHYSICAL EXAM:  General: Nontoxic, in NAD  Neuro:  Alert & oriented x 3  Abdomen: Drain dressing c/d/i. Drain with clear red colored out put. Drained 80cc thus far.     Impression: 50y/o M with hx of DDRT s/p hematoma evacuation/ abd washout and renal biopsy. Post op course c/b leukocytosis, infected hematoma, acute RLE DVT, and bleeding pseudoaneurysm at txp renal artery anastomosis. Now s/p graft nephrectomy with pelvic collection s/p drainage of collection 1/15 s/p repositioning and upsizing on 1/22.    Plan:  - IV antibiotics   - Continue to monitor out put.  - Flush drain per doctor orders.  - Trend vitals, labs.  - Will discuss with IR attending.     Please call IR at extension [phone] with any questions, concerns, or issues regarding above.

## 2020-01-23 NOTE — PROGRESS NOTE ADULT - SUBJECTIVE AND OBJECTIVE BOX
Seen in SICU, on HD, denies CP, SOB    Vital Signs Last 24 Hrs  T(C): 37.2 (01-23-20 @ 14:32), Max: 37.8 (01-22-20 @ 15:00)  T(F): 99 (01-23-20 @ 14:32), Max: 100 (01-22-20 @ 15:00)  HR: 75 (01-23-20 @ 14:00) (65 - 85)  BP: 101/54 (01-23-20 @ 14:00) (97/56 - 135/67)  BP(mean): 75 (01-23-20 @ 14:00) (71 - 96)  RR: 18 (01-23-20 @ 14:32) (12 - 23)  SpO2: 100% (01-23-20 @ 14:32) (94% - 100%)    Card S1S2  Lungs decr BS b/l  Abd soft  Extr + edema b/l LE R > L, + edema RUE, AVF patent                                                                                                                                                               7.6    18.46 )-----------( 679      ( 23 Jan 2020 01:14 )             23.8     23 Jan 2020 01:14    130    |  93     |  24     ----------------------------<  94     3.4     |  26     |  6.40     Ca    8.5        23 Jan 2020 01:14  Phos  3.2       23 Jan 2020 01:14  Mg     2.2       23 Jan 2020 01:14    TPro  5.6    /  Alb  2.1    /  TBili  1.1    /  DBili  0.7    /  AST  14     /  ALT  <5     /  AlkPhos  243    23 Jan 2020 01:14    LIVER FUNCTIONS - ( 23 Jan 2020 01:14 )  Alb: 2.1 g/dL / Pro: 5.6 g/dL / ALK PHOS: 243 U/L / ALT: <5 U/L / AST: 14 U/L / GGT: x           PT/INR - ( 23 Jan 2020 01:14 )   PT: 16.0 sec;   INR: 1.38 ratio      acetaminophen   Tablet .. 975 milliGRAM(s) Oral every 6 hours PRN  allopurinol 100 milliGRAM(s) Oral daily  aMIOdarone    Tablet 200 milliGRAM(s) Oral two times a day  apixaban 2.5 milliGRAM(s) Oral every 12 hours  brimonidine 0.2% Ophthalmic Solution 1 Drop(s) Both EYES three times a day  chlorhexidine 2% Cloths 1 Application(s) Topical <User Schedule>  epoetin trey Injectable 78855 Unit(s) IV Push <User Schedule>  gabapentin 100 milliGRAM(s) Oral every 12 hours  HYDROmorphone  Injectable 0.5 milliGRAM(s) IV Push every 3 hours PRN  latanoprost 0.005% Ophthalmic Solution 1 Drop(s) Both EYES daily  lidocaine 2% Jelly 1 milliLiter(s) IntraUrethral two times a day PRN  meropenem  IVPB 500 milliGRAM(s) IV Intermittent every 12 hours  micafungin IVPB 100 milliGRAM(s) IV Intermittent every 24 hours  Nephro-candace 1 Tablet(s) Oral daily  oxyCODONE    IR 5 milliGRAM(s) Oral every 4 hours PRN    A/P:    Adm w/Flu A 12/27/19  Hx ESRD on HD, s/p DDRT 12/8/19, DGF  S/p transplant kidney bx 12/13: ATN, focal TMA  S/p PLEX 12/13, 12/15; d/c-d 12/20 w/op HD 3 x wk  Re-adm 12/27, dx w/infected carlos-nephric hematoma   S/p OR 1/1 for washout and repeat renal graft biopsy (c/w ATN)  Dx w/RLE DVT  Developed gross hematuria  S/p OR 1/10 for infected carlos-nephric hematoma, hemorrhage involving pseudo-aneurysm of anastomosis of renal artery to external iliac vein,   s/p transplant nephrectomy, s/p IVCF   S/p PEA arrest 1/13, intubated, s/p CTA, + PE, R retrop collection, on AC  S/p IR drainage of abd collection  New RUE DVT  Abx per ID  HD TTS  Fluid removal 2-3kg as tolerates  Epogen w/HD

## 2020-01-23 NOTE — PROGRESS NOTE ADULT - ASSESSMENT
Assessment  49y Male with ESRD s/p DDRT on 12/8/19 with course complicated by delayed graft function requiring HD, and infected perinephric hematoma s/p washout on 1/1/2020 with worsening sepsis and transfer to ICU, s/p IR drainage of perinephric collection on 1/6. 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 He underwent operative repair of right external iliac artery with bovine patch, transplant nephrectomy, RLE thrombectomy, and IVC filter on 1/10. Post-op course complicated by cardiac arrest secondary to saddle PE.    Plan  - Cont leg elevation   - s/p IR reposition and upsize of drain to 12F, now with increased output  - Continue excellent SICU care     ROSLYN Freitas, PGY2  Vascular Surgery   [phone] with any questions

## 2020-01-23 NOTE — PROGRESS NOTE ADULT - ASSESSMENT
49M PMHx of ESRD on HD  ( via LUE AVF), HTN, Gout, Glaucoma, NET of pancreas, RA with hand contractures. s/p DDRT 12/8/19,  Post op course c/b DGF requiring HD. Renal bx x 2 c/w profound ATN.     Re-admitted with Influenza and perinephric hematoma. s/p hematoma evacuation/ abd washout and renal biopsy. Post op course c/b leukocytosis, infected hematoma, acute RLE DVT, and bleeding pseudoaneurysm at txp renal artery anastomosis. Now s/p graft nephrectomy. Course further complicated by saddle PE causing right heart strain and hemodynamic instability leading to cardiac arrest requiring ACLS, now with ROSC and intubated    [] DDRT c/b DGF/ATN/perinephric infected hematoma/pseudoneurysm/graft nephrectomy   - HD per nephrology team.  - s/p bedside IR drainage of RLQ fluid collection (1/15), Cx's: NGTD  - 1/21 CT scan showing persistent 6.9 x 3.9cm RLQ fluid collection, rim enchanting now s/p IR drain upsizing and repositioning, culture sent, will add fungal culture as well  - ID following;  cont Meropenem, Micafungin 100mg daily.  Will likely need long term antibiotics.   - pain control  - bowel regimen  - PT/OT/OOB.  VAC change today with PT  - Keep B/L LE bandaged with ace wrap  - can D/C [place]     [] Saddle PE, with right heart strain and complicated by PEA arrest followed by Torsade, ROSC achieved after ACLS  - vascular, CT and vascular cardiology following   - CT 1/21: There are pulmonary emboli within the right lower lobar arteries and the left lower lobar arteries extending into the distal segments as seen on prior study. The pulmonary emboli seen in the right and left pulmonary arteries are normal longer visualized. New distal to mid brachial DVT noted  1/19 Duplex: RUE Deep vein thrombosis in the mid to distal brachial vein.  - can switch to NOAC (Eliquis) today s there is no further invasive intervention planned; will need an accurate weight to determine the dosage   - repeat Echo reviewed     [] New acute DVT of R distal brachia vein   - continue anticoagulation     [] New acute DVT of R external iliac, common femoral, femoral veins, R soleal vein requiring heparin drip  - IVC filter placed 1/9, continue anticoagulation     [] Dispo  - continue SICU care

## 2020-01-23 NOTE — PROGRESS NOTE ADULT - ASSESSMENT
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 RVP with Influenza A s/p Tamiflu treatment course. Now with infected carlos-transplant hematoma.    1/1: s/p Ex-Lap and washout of infected hematoma (Enterobacter from cultures)  1/6: s/p IR guided drainage attempt of hematoma on 1/6 (NGTD from cultures  1/10: s/p transplant nephrectomy, repair of right external iliac artery with a bovine pericardial patch, repair of right external iliac vein and thrombectomy of right lower extremity veins.   1/10: Surgical cultures with Enterobacter  1/15: s/p IR guided drainage (220cc of hematoma)  1/22: s/p IR drain repositioning and upsizing (Gram stain with GNR but so far NGTD)    Some of the Enterobacter isolates R and S to Ertapenem  per core lab Ertapenem at borderline of Susceptible and Resistant breakpoint  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   I suspect Enterobacter is the primary pathogen    PEA arrest and Torsades on 1/13  CT C/A/P (1/13) with Saddle PE, interval increase in size of RP fluid collection (infected hematoma)  CT C/A/P (1/21) with pulmonary infarcts and residual hematoma (decreased in size)    Suspect fevers and leukocytosis secondary to either the pulmonary infarcts or from the residual hematoma/collection.   Fluid sampling from 1/22 drain repositioning/upsize with GNR on gram stain. So far no growth to date. Anticipate Enterobacter if there is growth    Overall, Saddle pulmonary embolus, Fever (worsening trend), Leukocytosis (increasing), Infected Hematoma, Positive Urine Culture, Influenza Infection, Renal Transplant Recipient    --Continue Meropenem 500 mg IV Q24H  --Would consider discontinuing Micafungin - no evidence of yeast in prior cultures.   --Continue to follow CBC with diff  --Continue to follow temperature curve  --Follow up on preliminary blood cultures   --Follow up on preliminary 1/22 IR cultures   --Management of Saddle PE per 8ICU team and vascular teams    I will continue to follow. Please feel free to contact me with any further questions.    Dada Jacobo M.D.  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 RVP with Influenza A s/p Tamiflu treatment course. Now with infected carlos-transplant hematoma.    1/1: s/p Ex-Lap and washout of infected hematoma (Enterobacter from cultures)  1/6: s/p IR guided drainage attempt of hematoma on 1/6 (NGTD from cultures  1/10: s/p transplant nephrectomy, repair of right external iliac artery with a bovine pericardial patch, repair of right external iliac vein and thrombectomy of right lower extremity veins.   1/10: Surgical cultures with Enterobacter  1/15: s/p IR guided drainage (220cc of hematoma)  1/22: s/p IR drain repositioning and upsizing (Gram stain with GNR but so far NGTD)    Some of the Enterobacter isolates R and S to Ertapenem  per core lab Ertapenem at borderline of Susceptible and Resistant breakpoint  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   I suspect Enterobacter is the primary pathogen    PEA arrest and Torsades on 1/13  CT C/A/P (1/13) with Saddle PE, interval increase in size of RP fluid collection (infected hematoma)  CT C/A/P (1/21) with pulmonary infarcts and residual hematoma (decreased in size)    Suspect fevers and leukocytosis secondary to either the pulmonary infarcts or from the residual hematoma/collection.   Fluid sampling from 1/22 drain repositioning/upsize with GNR on gram stain. So far no growth to date. Anticipate Enterobacter if there is growth    Overall, Saddle pulmonary embolus, Fever (worsening trend), Leukocytosis (increasing), Infected Hematoma, Positive Urine Culture, Renal Transplant Recipient    --Continue Meropenem 500 mg IV Q24H  --Would consider discontinuing Micafungin - no evidence of yeast in prior cultures.   --Continue to follow CBC with diff  --Continue to follow temperature curve  --Follow up on preliminary blood cultures   --Follow up on preliminary 1/22 IR cultures   --Management of Saddle PE per 8ICU team and vascular teams    I will continue to follow. Please feel free to contact me with any further questions.    Dada Jacobo M.D.  [facility] Division of Infectious Disease  8AM-5PM: Pager Number [phone]  After Hours (or if no response): Please contact the Infectious Diseases Office at [phone]

## 2020-01-23 NOTE — PROGRESS NOTE ADULT - SUBJECTIVE AND OBJECTIVE BOX
Transplant Surgery - Multidisciplinary Rounds  --------------------------------------------------------------  R DDRT    Date:  12/8/19     Present:   Patient seen with multidisciplinary team including Transplant Surgeon: Dr. Espinal, Transplant Nephrologist: Dr. Puckett, Nephrology fellow Abel Flores, NP: Aislinn Hunt and MAEVE RODRIGUEZ, surgical resident Mehdi Bess during am rounds and examined with Dr. Espinal. Disciplines not in attendance will be notified of the plan.     HPI: 49M PMHx of ESRD on HD  ( via LUE AVF), HTN, Gout, Glaucoma, NET of pancreas (s/p robotic distal panc/splenectomy at [place] 2015 by Dr. Young, followed by Dr. Raphael, [facility] Oncologist), RA with hand contractures. He underwent DDRT on 12/8/19. Post op course c/b DGF requiring HD, thrombocytopenia, elevated LDH and haptoglobin. Schistocytes  on peripheral smear concerning for TMA. Envarsus held. Received Belatacept 12/13. Started on PLEX (12/12, 12/15). Underwent renal bx on 12/13 c/w profound ATN.  Discharged home on 12/20/19 w/ outpatient HD.     Re-admitted 12/27 with influenza, completed treatement with Tamiflu on 12/31. Urine cx on admission grew Ent cloacae, was started on meropenem 12/31. Renal US on admission with increasing hematoma.   ·	OR 1/1 for ex-lap, hematoma evacuation, washout and renal bx. (c/w ATN)  OR cxs grew CRE and e coli. Post op course c/b:   ·	fevers and leukocytosis, CT a/p (1/4) with increased perinephric collection. Underwent IR guided collection 1/6   ·	New onset hematuria, martinez inserted 1/7, started CBI  ·	RLE edema, RLE doppler (1/8) with acute above the knee thrombus of R external iliac, common femoral and femoral veins, Acute calf vein thrombus affecting R soleal vein, Heparin gtt initiated   ·	AMS 1/9  (head CT neg), hyponatremic  ·	Significant hematuria and bleeding around the martinez. Angio 1/9 showed actively bleeding pseudoaneurysm at the anastomosis of the transplant renal artery with the recipient iliac artery. A stent was placed in the iliac artery to  control hemorrhage. IVC filter placed.   ·	Emergent OR 1/9-1/10 for graft nephrectomy;  ureter to bladder anastomosis was completely disrupted, repaired the R external iliac artery with bovine pericardial patch, repaired right external iliac vein, performed thrombectomy of RLE veins, Intra op received 3u PRBC.   ·	Extubated 1/11  ·	1/13 Saddle PE, with right heart strain and complicated by PEA arrest followed by Torsade, ROSC achieved after ACLS; re-intubated  ·	1/15 Renal US with 13.7 x 5.0 x 1.5 cm in RLQ transplant bed; s/p bedside IR guided drainage, pigtail in place. Fluid cx to date with NG    Interval events:    - IR repositioning and upsizing the right lower quadrant drain, no change in drain quality        - afebrile for 24 hours, on meropenem and micafungin   - no overnight event     cxs:   12/29: Urine Cx: Enterobacter Cloacae  1/1 OR Perinephric collection Cx:  Carbapenem resistant Ent Cloacae, E. Coli  1/1: R kidney abscess swab: Enterobacter Cloacae  1/1  OR tissue: Enterobacter Cloacae  1/5: Urine Cx: Enteropbacter Cloacae  1/5: Skin incision Cx (bedside): NG  1/6: Adominal fluid Cx from IR:  NG   1/7: Urine Cx:  NG  1/7: BCX2:  NG  1/10 retroperit hematoma - enerobacter   1/10 bladder hematoma - enterobacter  1/15 Drainage fluid - NGTD  1/16: BC X2: NGTD  1/18: SPutum: Normal harvey  1/21: BC X2: NTGD    Potential Discharge date: pending clinical improvement    MEDICATIONS  (STANDING):  allopurinol 100 milliGRAM(s) Oral daily  aMIOdarone    Tablet 200 milliGRAM(s) Oral two times a day  brimonidine 0.2% Ophthalmic Solution 1 Drop(s) Both EYES three times a day  chlorhexidine 2% Cloths 1 Application(s) Topical <User Schedule>  epoetin trey Injectable 16628 Unit(s) IV Push <User Schedule>  gabapentin 100 milliGRAM(s) Oral every 12 hours  heparin  Infusion 1000 Unit(s)/Hr (14 mL/Hr) IV Continuous <Continuous>  latanoprost 0.005% Ophthalmic Solution 1 Drop(s) Both EYES daily  meropenem  IVPB 500 milliGRAM(s) IV Intermittent every 24 hours  micafungin IVPB 100 milliGRAM(s) IV Intermittent every 24 hours  Nephro-candace 1 Tablet(s) Oral daily    MEDICATIONS  (PRN):  acetaminophen   Tablet .. 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  Carpal tunnel syndrome  Brain cyst: had MRI recently- now gone  Anemia  Gastric ulcer: Long time ago  Rheumatoid arthritis  Renal transplant recipient  Breakdown (mechanical) of penile (implanted) prosthesis, sequela  End-stage renal disease (ESRD): PERMACATH PLACEMENT  Abscess of left buttock: s/p I &amp; D  AV fistula: placement left lower arm  S/P cystoscopy: stent placement &amp; removal for kidney stones, lithiotripsy  s/p Lt Carpal tunnel: 2011    Vital Signs Last 24 Hrs  T(C): 37.6 (23 Jan 2020 07:00), Max: 37.8 (22 Jan 2020 11:00)  T(F): 99.7 (23 Jan 2020 07:00), Max: 100 (22 Jan 2020 11:00)  HR: 72 (23 Jan 2020 09:00) (65 - 84)  BP: 108/59 (23 Jan 2020 09:00) (97/56 - 164/67)  BP(mean): 72 (23 Jan 2020 09:00) (71 - 100)  RR: 19 (23 Jan 2020 09:00) (12 - 23)  SpO2: 100% (23 Jan 2020 09:00) (87% - 100%)    I&O's Summary    22 Jan 2020 07:01 - 23 Jan 2020 07:00  --------------------------------------------------------  IN: 349 mL / OUT: 120 mL / NET: 229 mL    23 Jan 2020 07:01  -  23 Jan 2020 10:08  --------------------------------------------------------  IN: 42 mL / OUT: 0 mL / NET: 42 mL    CBC (01-23 @ 01:14)                          7.6<L>                   18.46<H>  )--------------(  679<H>     --    % Neuts, --    % Lymphs, ANC: --                              23.8<L>  CBC (01-22 @ 00:38)                          7.6<L>                   19.25<H>  )--------------(  556<H>     78.9<H>% Neuts, 8.8<L>% Lymphs, ANC: 15.19<H>                          23.2<L>    BMP (01-23 @ 01:14)       130<L>  |  93<L>   |  24<H> 			Ca++ --      Ca 8.5          ---------------------------------( 94    		Mg 2.2          3.4<L>  |  26      |  6.40<H>			Ph 3.2     BMP (01-22 @ 00:38)       135     |  96      |  17    			Ca++ --      Ca 8.0<L>       ---------------------------------( 129<H>		Mg 1.8          3.3<L>  |  25      |  4.72<H>			Ph 2.5       LFTs (01-23 @ 01:14)      TPro 5.6<L> / Alb 2.1<L> / TBili 1.1 / DBili 0.7<H> / AST 14 / ALT <5<L> / AlkPhos 243<H>  LFTs (01-22 @ 00:38)      TPro 5.2<L> / Alb 2.1<L> / TBili 1.0 / DBili 0.6<H> / AST 17 / ALT 5<L> / AlkPhos 255<H>    Coags (01-23 @ 07:11)  aPTT 60.6<H> / INR -- / PT --  Coags (01-23 @ 01:14)  aPTT 76.2<H> / INR 1.38<H> / PT 16.0<H>        VBG (01-22 @ 02:03)     7.46<H> / 42 / 40 / 30<H> / 6.0<H> / 77%      Lactate: 1.2      -> .Body Fluid Abdominal Fluid Culture (01-22 @ 22:15)       polymorphonuclear leukocytes seen  Gram Negative Rods seen  by cytocentrifuge    NG  NG    -> .Blood Blood Culture (01-21 @ 05:11)     NG    NG    No growth to date.    -> .Blood Blood Culture (01-21 @ 03:02)     NG    NG    No growth to date.    -> .Sputum Sputum, trap Culture (01-18 @ 15:26)       No polymorphonuclear leukocytes per low power field  Rare Squamous epithelial cells per low power field  Rare Gram positive cocci in pairs per oil power field    NG    Normal Respiratory Harvey present    -> .Blood Blood Culture (01-16 @ 14:51)     NG    NG    No growth at 5 days.    -> .Body Fluid Abdominal Fluid Culture (01-15 @ 22:02)       polymorphonuclear leukocytes seen  No organisms seen  by cytocentrifuge    NG    No growth at 5 days      EXAM:  CT ABDOMEN AND PELVIS IC    EXAM:  CT ANGIO CHEST (W)AW IC                        PROCEDURE DATE:  01/21/2020    INTERPRETATION:  CLINICAL INFORMATION: Reevaluate pulmonary embolus  COMPARISON: CTA chest 1/13/2020    PROCEDURE:   CT Angiography of the Chest was performed followed by portal venous phase imaging of the Abdomen and Pelvis.  IV Contrast: 90 ml of Omnipaque 350 was injected intravenously. 10 ml were discarded.  Oral contrast: None.  Sagittal and coronal reformats were performed as well as 3D (MIP) reconstructions.  FINDINGS:  CHEST:     There are pulmonary emboli within the right lower lobar arteries and the left lower lobar arteries extending into the distal segments as seen on prior study. The pulmonary emboli seen in the right and left pulmonary arteries are normal longer visualized.    The heart size is normal. No pericardial effusion. There are a few mediastinal lymph nodes. No lymphadenopathy.    No endobronchial lesions. Bilateral wedgelike opacities in the lower lobes. Trace right pleural effusion.    Calcification within the left thyroid lobe. The upper abdomen is unremarkable.    Age indeterminate right-sided rib fractures. Chronic left-sided rib fractures.    ABDOMEN AND PELVIS:    The liver is unremarkable. Mild periportal edema. Vicarious excretion of contrast within the gallbladder. Surgical clips seen at the pancreatic tail. The spleen is not visualized.    The adrenal glands are unremarkable. Subcentimeter low attenuating lesions within the right kidney.    The left kidney is unremarkable. No hydronephrosis. The urinary bladder is collapsed around Martinez catheter balloon. The prostate is normal in size.  Bilateral corpora cavernosum implants.    No bowel obstruction. Normal appendix.    Calcified plaques within the abdominal aorta. Status post infrarenal IVC filter placement. Poor opacification of the IVC limiting evaluation for thrombus surrounding the IVC filter.    There is a right lower quadrant drainage catheter traversing a right lower quadrant fluid collection in the peritoneum. The tip of this changed catheters correlate within the fluid collection, which measures 6.9 x 3.9 cm, previously measuring 9.5 x 5.0 cm.    Anasarca.    Degenerative changes.    IMPRESSION:     Pulmonary emboli within the right lower lobar arteries and left lower lobar arteries extending into the distal segments as seen on prior study. The pulmonary emboli seen in the right and left pulmonary arteries are no longer visualized.    New bilateral wedgelike opacities in the lower lobes, which may represent pulmonary infarcts.    Interval decrease in size in the right lower quadrant fluid collection.        REVIEW OF SYSTEMS  Gen: + weakness   Skin: No rashes  Head/Eyes/Ears/Mouth: No headache; Normal hearing; Normal vision w/o blurriness; No sinus pain/discomfort, sore throat  Respiratory: no SOB  CV: No chest pain, PND, orthopnea  GI: incisional tenderness   : + martinez  MSK: + lower extrem edema, RUE edema  Neuro: No dizziness/lightheadedness, weakness, seizures, numbness, tingling  Heme: No easy bruising or bleeding  Endo: No heat/cold intolerance  Psych: No significant nervousness, anxiety, stress, depression    PHYSICAL EXAM:    Constitutional: NAD  	Eyes: Anicteric, PERRLA  	Respiratory: + crackles  	Cardiovascular: RRR  	Gastrointestinal: Soft abdomen, appropriate incisional TTP, ND.  wound vac ss/murky output, RLQ drain with SS drainage    Genitourinary:  Martinez, anuric, residual hematuria in bag    Extremities: b/l lower ext edema, RUE +2 edema with ace wrapped  	Vascular: DP signal bilateral, L AVF palpable  	Neurological: A&O x3.  	Skin: no new lesions/ulcerations  	Musculoskeletal: Moving all extremities

## 2020-01-23 NOTE — PROGRESS NOTE ADULT - PROBLEM SELECTOR PLAN 4
Leukocytosis improving, remains at 18 today. Afebrile.   Continue Meropenem and Micafungin.  ID team follow up

## 2020-01-23 NOTE — PROGRESS NOTE ADULT - ASSESSMENT
49 year old male with ESRD on HD, now s/p ddrt on 12/8/2019 course complicated by TMA/profound ATN, who presents from a HD center with a fever and cough. He was initially admitted with influenza. He is s/p washout of infected collection and biopsy which revealed ATN.  He was found to have diffusely enlarging perinephric fluid collection.    While on telemetry, he had an episode of a wide complex tachycardia. It initially appears irregular, suggesting an atrial arrhythmia with aberrancy, though the remainder appears more like an episode of non-sustained VT.   s/p washout on 1/1/2020 with worsening sepsis and transfer to ICU, s/p IR drainage of perinephric collection on 1/6. 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 He underwent operative repair of right external iliac artery with pericardial patch, transplant nephrectomy, RLE thrombectomy, and IVC filter on 1/9.     Now s/p corrina ->tachy arrest with tdp, af rvr, now on amio, with large PE    - critical events noted. Now off of pressor support. Bp has improved overall  - developed malignant arrhythmias which seemed to start with bradycardia, leading to more tachycardia and bursts of VT and rapid AF.  The apparent af degenerated into torsades.  He was shocked and has been maintaining SR since  - cont amio at 200 daily. No further events over last 72 hours.  - hold metoprolol in the setting of borderline BP.  - repeat ekg daily to evaluate qtc, while on antifungals/amiodarone  - arrhythmias were most likely triggered by the massive vte event, are unlikely to represent a primary cardiac issue  - hs trop noted, though in the context of his arrhythmias, and right heart strain from the pe, the trop is more likely to reflect demand than an acs event  - ideally would be on asa, statin when able     - now on apixiban  - echo with preserved/hyperdynamic lv, with RV failure consistent with acute RV overload  - CTPA on 1/21 demonstrated some resolution of PE and evolving pulmonary infarcts.   - Further cardiac workup will depend on clinical course.  -remains at risk of decompensation   - will follow with you

## 2020-01-23 NOTE — PROGRESS NOTE ADULT - SUBJECTIVE AND OBJECTIVE BOX
[facility] DIVISION OF KIDNEY DISEASES AND HYPERTENSION -- FOLLOW UP NOTE  --------------------------------------------------------------------------------  HPI: 49 yr old man with ESRD s/p DDRT in Dec 2019 complicated by DGF due to ATN and early TMA thought to be due to prograf. Was on belatacept/MMF/Pred immunosuppression. He was admitted with influenza, course complicated by perinephric hematoma complicated by enterobacter infection. s/p surgical exploration and wash out. He developed DVT started on heparin drip, subsequent gross hematuria. He was taken to the OR and found to have dehiscence of the ureter-bladder anastomosis as well as vascular anastomosis. He underwent graft nephrectomy on 1/10/20 with bovine biopatch repair of the EIA. On 1/13/20 he had cardiac arrest due to large pulmonary embolism s/p successful resuscitation. S/p bedside drainage of RLQ collection on 1/15/20, 200 cc removed and drain placed. RUE ultrasound showed DVT in the brachial vein. Pt. had placement of another drain for abdominal collection removal.    Pt. seen and examined at bedside this AM. Pt. states that he has no new complaints. To have HD treatment with [facility] today.    PAST HISTORY  --------------------------------------------------------------------------------  No significant changes to PMH, PSH, FHx, SHx, unless otherwise noted    ALLERGIES & MEDICATIONS  --------------------------------------------------------------------------------  Allergies    coconut (Anaphylaxis)  morphine (Pruritus; Rash)    Intolerances    Standing Inpatient Medications  allopurinol 100 milliGRAM(s) Oral daily  aMIOdarone    Tablet 200 milliGRAM(s) Oral two times a day  brimonidine 0.2% Ophthalmic Solution 1 Drop(s) Both EYES three times a day  chlorhexidine 2% Cloths 1 Application(s) Topical <User Schedule>  epoetin trey Injectable 47701 Unit(s) IV Push <User Schedule>  gabapentin 100 milliGRAM(s) Oral every 12 hours  heparin  Infusion 1000 Unit(s)/Hr IV Continuous <Continuous>  latanoprost 0.005% Ophthalmic Solution 1 Drop(s) Both EYES daily  meropenem  IVPB 500 milliGRAM(s) IV Intermittent every 24 hours  micafungin IVPB 100 milliGRAM(s) IV Intermittent every 24 hours  Nephro-candace 1 Tablet(s) Oral daily    REVIEW OF SYSTEMS  --------------------------------------------------------------------------------  Gen: + lethargy, + fatigue  Respiratory: No dyspnea  CV: No chest pain  GI: No abdominal pain  MSK: + LE edema  Neuro: No dizziness  Heme: No bleeding    All other systems were reviewed and are negative, except as noted.    VITALS/PHYSICAL EXAM  --------------------------------------------------------------------------------  T(C): 37.7 (01-23-20 @ 03:00), Max: 37.8 (01-22-20 @ 11:00)  HR: 74 (01-23-20 @ 06:00) (65 - 84)  BP: 130/62 (01-23-20 @ 06:00) (97/56 - 164/67)  RR: 14 (01-23-20 @ 06:00) (12 - 23)  SpO2: 97% (01-23-20 @ 06:00) (79% - 100%)  Wt(kg): --    01-21-20 @ 07:01  -  01-22-20 @ 07:00  --------------------------------------------------------  IN: 1672 mL / OUT: 2870 mL / NET: -1198 mL    01-22-20 @ 07:01  -  01-23-20 @ 06:40  --------------------------------------------------------  IN: 316 mL / OUT: 120 mL / NET: 196 mL    Physical Exam:  	Gen: NAD, resting  	HEENT: supple neck  	Pulm: decreased breath sounds bilaterally  	CV: RRR, S1S2; no rub  	Abd: +BS, soft, 2x RLQ with drain in place  	: No suprapubic tenderness  	UE: RUE swelling  	LE: b/l pitting edema, R>L (improved)    LABS/STUDIES  --------------------------------------------------------------------------------              7.6    18.46 >-----------<  679      [01-23-20 @ 01:14]              23.8     130  |  93  |  24  ----------------------------<  94      [01-23-20 @ 01:14]  3.4   |  26  |  6.40        Ca     8.5     [01-23-20 @ 01:14]      Mg     2.2     [01-23-20 @ 01:14]      Phos  3.2     [01-23-20 @ 01:14]    Creatinine Trend:  SCr 6.40 [01-23 @ 01:14]  SCr 4.72 [01-22 @ 00:38]  SCr 7.26 [01-21 @ 00:26]  SCr 6.37 [01-20 @ 00:40]  SCr 5.28 [01-19 @ 01:39] [facility] DIVISION OF KIDNEY DISEASES AND HYPERTENSION -- FOLLOW UP NOTE  --------------------------------------------------------------------------------  HPI: 49 yr old man with ESRD s/p DDRT in Dec 2019 complicated by DGF due to ATN and early TMA thought to be due to prograf. Was on belatacept/MMF/Pred immunosuppression. He was admitted with influenza, course complicated by perinephric hematoma complicated by enterobacter infection. s/p surgical exploration and wash out. He developed DVT started on heparin drip, subsequent gross hematuria. He was taken to the OR and found to have dehiscence of the ureter-bladder anastomosis as well as vascular anastomosis. He underwent graft nephrectomy on 1/10/20 with bovine biopatch repair of the EIA. On 1/13/20 he had cardiac arrest due to large pulmonary embolism s/p successful resuscitation. S/p bedside drainage of RLQ collection on 1/15/20, 200 cc removed and drain placed. RUE ultrasound showed DVT in the brachial vein. Pt. had placement of upsized drain for abdominal collection removal.    Pt. seen and examined at bedside this AM. Pt. states that he has no new complaints. To have HD treatment with UF today.    PAST HISTORY  --------------------------------------------------------------------------------  No significant changes to PMH, PSH, FHx, SHx, unless otherwise noted    ALLERGIES & MEDICATIONS  --------------------------------------------------------------------------------  Allergies    coconut (Anaphylaxis)  morphine (Pruritus; Rash)    Intolerances    Standing Inpatient Medications  allopurinol 100 milliGRAM(s) Oral daily  aMIOdarone    Tablet 200 milliGRAM(s) Oral two times a day  brimonidine 0.2% Ophthalmic Solution 1 Drop(s) Both EYES three times a day  chlorhexidine 2% Cloths 1 Application(s) Topical <User Schedule>  epoetin trey Injectable 88465 Unit(s) IV Push <User Schedule>  gabapentin 100 milliGRAM(s) Oral every 12 hours  heparin  Infusion 1000 Unit(s)/Hr IV Continuous <Continuous>  latanoprost 0.005% Ophthalmic Solution 1 Drop(s) Both EYES daily  meropenem  IVPB 500 milliGRAM(s) IV Intermittent every 24 hours  micafungin IVPB 100 milliGRAM(s) IV Intermittent every 24 hours  Nephro-candace 1 Tablet(s) Oral daily      REVIEW OF SYSTEMS  --------------------------------------------------------------------------------  Gen: + lethargy, + fatigue  Respiratory: No dyspnea  CV: No chest pain  GI: No abdominal pain  MSK: + LE edema  Neuro: No dizziness  Heme: No bleeding    All other systems were reviewed and are negative, except as noted.    VITALS/PHYSICAL EXAM  --------------------------------------------------------------------------------  T(C): 37.7 (01-23-20 @ 03:00), Max: 37.8 (01-22-20 @ 11:00)  HR: 74 (01-23-20 @ 06:00) (65 - 84)  BP: 130/62 (01-23-20 @ 06:00) (97/56 - 164/67)  RR: 14 (01-23-20 @ 06:00) (12 - 23)  SpO2: 97% (01-23-20 @ 06:00) (79% - 100%)  Wt(kg): --    01-21-20 @ 07:01  -  01-22-20 @ 07:00  --------------------------------------------------------  IN: 1672 mL / OUT: 2870 mL / NET: -1198 mL    01-22-20 @ 07:01  -  01-23-20 @ 06:40  --------------------------------------------------------  IN: 316 mL / OUT: 120 mL / NET: 196 mL    Physical Exam:  	Gen: NAD, resting  	HEENT: supple neck  	Pulm: decreased breath sounds bilaterally  	CV: RRR, S1S2; no rub  	Abd: +BS, soft, RLQ with drain in place  	: No suprapubic tenderness  	UE: RUE swelling  	LE: b/l pitting edema, R>L (improved)    LABS/STUDIES  --------------------------------------------------------------------------------              7.6    18.46 >-----------<  679      [01-23-20 @ 01:14]              23.8     130  |  93  |  24  ----------------------------<  94      [01-23-20 @ 01:14]  3.4   |  26  |  6.40        Ca     8.5     [01-23-20 @ 01:14]      Mg     2.2     [01-23-20 @ 01:14]      Phos  3.2     [01-23-20 @ 01:14]    Creatinine Trend:  SCr 6.40 [01-23 @ 01:14]  SCr 4.72 [01-22 @ 00:38]  SCr 7.26 [01-21 @ 00:26]  SCr 6.37 [01-20 @ 00:40]  SCr 5.28 [01-19 @ 01:39]

## 2020-01-23 NOTE — PROGRESS NOTE ADULT - ATTENDING COMMENTS
Dr. Philippe (Attending Physician)  Bilateral PEs on room air, heparin gtt  ESRD HD today  Serosanguinous fluid from drain, will discuss starting coumadin or eliquis with transplant team  On meropenem, can likely dc micafungin

## 2020-01-23 NOTE — PROGRESS NOTE ADULT - SUBJECTIVE AND OBJECTIVE BOX
Follow Up:  infected hematoma    Interval History: afebrile overnight. going for HD today. notes continued RLQ pain which is largely unchanged.     REVIEW OF SYSTEMS  [  ] ROS unobtainable because:    [ x ] All other systems negative except as noted below    Constitutional:  [ ] fever [ ] chills  [ ] weight loss  [ ] weakness  Skin:  [ ] rash [ ] phlebitis	  Eyes: [ ] icterus [ ] pain  [ ] discharge	  ENMT: [ ] sore throat  [ ] thrush [ ] ulcers [ ] exudates  Respiratory: [ ] dyspnea [ ] hemoptysis [ ] cough [ ] sputum	  Cardiovascular:  [ ] chest pain [ ] palpitations [ ] edema	  Gastrointestinal:  [ ] nausea [ ] vomiting [ ] diarrhea [ ] constipation [ x] pain	  Genitourinary:  [ ] dysuria [ ] frequency [ ] hematuria [ ] discharge [ ] flank pain  [ ] incontinence  Musculoskeletal:  [ ] myalgias [ ] arthralgias [ ] arthritis  [ ] back pain  Neurological:  [ ] headache [ ] seizures  [ ] confusion/altered mental status    Allergies  coconut (Anaphylaxis)  morphine (Pruritus; Rash)        ANTIMICROBIALS:  meropenem  IVPB 500 every 12 hours  micafungin IVPB 100 every 24 hours      OTHER MEDS:  MEDICATIONS  (STANDING):  acetaminophen   Tablet .. 975 every 6 hours PRN  allopurinol 100 daily  aMIOdarone    Tablet 200 two times a day  apixaban 2.5 every 12 hours  epoetin trey Injectable 33343 <User Schedule>  gabapentin 100 every 12 hours  HYDROmorphone  Injectable 0.5 every 3 hours PRN  oxyCODONE    IR 5 every 4 hours PRN      Vital Signs Last 24 Hrs  T(C): 37.2 (23 Jan 2020 15:00), Max: 37.7 (23 Jan 2020 03:00)  T(F): 99 (23 Jan 2020 15:00), Max: 99.8 (23 Jan 2020 03:00)  HR: 69 (23 Jan 2020 16:00) (65 - 85)  BP: 97/52 (23 Jan 2020 16:00) (95/52 - 135/67)  BP(mean): 71 (23 Jan 2020 16:00) (69 - 96)  RR: 20 (23 Jan 2020 16:00) (12 - 23)  SpO2: 98% (23 Jan 2020 16:00) (94% - 100%)    PHYSICAL EXAMINATION:  General: Alert and Awake, NAD  HEENT: PERRL, EOMI  Neck: Supple  Cardiac: RRR, No M/R/G  Resp: CTAB, No Wh/Rh/Ra  Abdomen: +RLQ drain with serosanguinous drainage. +RLQ wound vac over transplant nephrectomy site, NBS, mild tenderness to palpation over RLQ  MSK: 1-2+ RLE edema. No Calf tenderness  Skin: No rashes or lesions. Skin is warm and dry to the touch.   Neuro: Alert and Awake. CN 2-12 Grossly intact. Moves all four extremities spontaneously.  Psych: Calm, Pleasant, Cooperative                          7.6    18.46 )-----------( 679      ( 23 Jan 2020 01:14 )             23.8       01-23    130<L>  |  93<L>  |  24<H>  ----------------------------<  94  3.4<L>   |  26  |  6.40<H>    Ca    8.5      23 Jan 2020 01:14  Phos  3.2     01-23  Mg     2.2     01-23    TPro  5.6<L>  /  Alb  2.1<L>  /  TBili  1.1  /  DBili  0.7<H>  /  AST  14  /  ALT  <5<L>  /  AlkPhos  243<H>  01-23          MICROBIOLOGY:  v  .Body Fluid Abdominal Fluid  01-22-20 --  --    polymorphonuclear leukocytes seen  Gram Negative Rods seen  by cytocentrifuge      .Blood Blood  01-21-20   No growth to date.  --  --      .Blood Blood  01-21-20   No growth to date.  --  --      .Sputum Sputum, trap  01-18-20   Normal Respiratory Gillian present  --    No polymorphonuclear leukocytes per low power field  Rare Squamous epithelial cells per low power field  Rare Gram positive cocci in pairs per oil power field      .Blood Blood  01-16-20   No growth at 5 days.  --  --      .Body Fluid Abdominal Fluid  01-15-20   No growth at 5 days  --    polymorphonuclear leukocytes seen  No organisms seen  by cytocentrifuge      .Tissue Other  01-10-20   No growth at 5 days  --  Enterobacter cloacae (Carbapenem Resistant)      .Blood Blood-Venous  01-07-20   No growth at 5 days.  --  --      .Urine Catheterized  01-07-20   <10,000 CFU/mL Normal Urogenital Gillian  --  --      Abdominal Fl Abdominal Fluid  01-06-20   No growth at 5 days  --    No polymorphonuclear leukocytes seen  No organisms seen  by cytocentrifuge      .Urine Clean Catch (Midstream)  01-05-20   >100,000 CFU/ml Enterobacter cloacae  --  Enterobacter cloacae      .Blood Blood  01-04-20   No growth at 5 days.  --  --      .Tissue Other  01-01-20   Numerous Enterobacter cloacae complex  --  Enterobacter cloacae complex      .Surgical Swab collection around right kidney  01-01-20   Moderate Enterobacter cloacae complex  --  Enterobacter cloacae complex      .Body Fluid periphrenic collection  01-01-20   **Please Note**: This is a Corrected Report**  Moderate Enterobacter cloacae complex  Moderate Escherichia coli  Previously reported as:  Moderate Enterobacter cloacae (Carbapenem Resistant)  Moderate Escherichia coli  --  Escherichia coli  Enterobacter cloacae complex      .Surgical Swab right kidney abscess  01-01-20   Moderate Enterobacter cloacae complex  --  Enterobacter cloacae complex      .Urine Clean Catch (Midstream)  12-29-19   >100,000 CFU/ml Enterobacter cloacae  --  Enterobacter cloacae      .Blood Blood-Peripheral  12-27-19   No growth at 5 days.  --  --    RADIOLOGY:    <The imaging below has been reviewed and visualized me independently>    EXAM:  CT ABDOMEN AND PELVIS IC                        EXAM:  CT ANGIO CHEST (W)AW IC                        PROCEDURE DATE:  01/21/2020    Pulmonary emboli within the right lower lobar arteries and left lower lobar arteries extending into the distal segments as seen on prior study. The pulmonary emboli seen in the right and left pulmonary arteries are no longer visualized.  New bilateral wedgelike opacities in the lower lobes, which may represent pulmonary infarcts.  Interval decrease in size in the right lower quadrant fluid collection.    EXAM:  XR CHEST PORTABLE URGENT 1V                        PROCEDURE DATE:  01/22/2020    No significant interval change.

## 2020-01-23 NOTE — PROGRESS NOTE ADULT - ASSESSMENT
50 y/o male presenting with influenza A with a hospital course complicated by:  - Infected transplant perinephric hematoma s/p right groin exploration, washout, & biopsy c/b persistent perinephric collection s/p FNA  - Right external iliac & common femoral DVTs s/p IVC filter & thrombectomy of the RLE veins  - Gross hematuria w/ pseudoaneurysm of the right external iliac & transplant renal artery seen on IR angiogram s/p right groin exploration, washout of the RP space, and transplant nephrectomy w/ repair of the right external iliac artery w/ a bovine patch  - Persistent right iliac fossa collection s/p IR drainage  - Cardiac arrest secondary to a saddle PE    PLAN:    Neuro: acute post-op pain  - Monitor mental status  - Pain control with acetaminophen, oxycodone, Dilaudid, and gabapentin    Resp: saddle PE, new pulmonary infarct sequela  - Monitor pulse oximeter  - Supportive cares and continued anticoagulation for infarction. Daily CXRs.  - OOB/amb/encourage IS to prevent atelectasis    CV: obstructive shock secondary to PE (resolved), s/p PEA arrest with progression to torsades  - Monitor vital signs  - Amiodarone for episode of torsades  - Currently holding home metoprolol 25 mg PO q12hrs for HTN, home hydralazine 50 mg PO BID.    GI: no acute issues  - Regular diet as tolerated  - Bowel regimen with senna & Miralax  - CT showing decreased collection R iliac fossa  - upsized IR drain for R iliac fossa    Renal: CKD stage V s/p DDRT c/b DGF & perinephric collection s/p right groin exploration, washout, & biopsy c/b persistent perinephric collection s/p FNA; gross hematuria w/ pseudoaneurysm of the right external iliac & transplant renal artery s/p right groin exploration, washout of the RP space, & transplant nephrectomy w/ repair of the right external iliac artery w/ a bovine patch; persistent right iliac fossa collection s/p IR drainage  - HD today per Nephrology     Heme: anemia of chronic diseases, saddle PE, right external iliac & common femoral DVTs s/p IVC filter  - Monitor CBC and coags  - Heparin infusion for anticoagulation for DVTs and PE  - Epogen resumed    ID: Enterobacter cloacae UTI, transplant perinephric collection and right iliac fossa collection with carbapenem resistant Enterobacter cloacae (CRE) and E. coli.  - Sputum on 1/18 with normal respiratory harvey  - Blood cultures from 1/16 no growth to date, 1/21 no growth to date.  - Right iliac fossa collection culture from 1/15 with no growth.  - RP hematoma culture from 1/10 with carbapenem resistant Enterobacter cloacae  - On Meropenem and micafungin  - Trending Procalcitonin, and monitoring for fevers +/- leukocytosis.    Endo: No acute issues  - Monitor glucose on BMP    Misc: Gout, glaucoma  - Home allopurinol for gout  - Home eyedrops (latanoprost, dorzolamide, & brimonidine) for glaucoma  - PT/OT    Disposition:  - Full code  - Will remain in SICU

## 2020-01-23 NOTE — PROGRESS NOTE ADULT - ATTENDING COMMENTS
Drain repositioned and upsized yesterday    Plan  meropenem and mycofungin to continue until cultures return  switch heparin to eliquis 2.5mg bid  d/c michelle  for repeat imaging early next week

## 2020-01-23 NOTE — PROGRESS NOTE ADULT - PROBLEM SELECTOR PLAN 1
Volume overload improving. Electrolyte balance is acceptable (mild hyponatremia likely 2/2 volume overload). Last HD treatment on 1/21/20, tolerating 2L of UF. Plan for HD with UF today. Monitor labs

## 2020-01-24 LAB
ALBUMIN SERPL ELPH-MCNC: 1.8 G/DL — LOW (ref 3.3–5)
ALP SERPL-CCNC: 290 U/L — HIGH (ref 40–120)
ALT FLD-CCNC: 5 U/L — LOW (ref 10–45)
ANION GAP SERPL CALC-SCNC: 14 MMOL/L — SIGNIFICANT CHANGE UP (ref 5–17)
APTT BLD: 38.5 SEC — HIGH (ref 27.5–36.3)
AST SERPL-CCNC: 21 U/L — SIGNIFICANT CHANGE UP (ref 10–40)
BILIRUB DIRECT SERPL-MCNC: 0.7 MG/DL — HIGH (ref 0–0.2)
BILIRUB INDIRECT FLD-MCNC: 0.4 MG/DL — SIGNIFICANT CHANGE UP (ref 0.2–1)
BILIRUB SERPL-MCNC: 1.1 MG/DL — SIGNIFICANT CHANGE UP (ref 0.2–1.2)
BUN SERPL-MCNC: 15 MG/DL — SIGNIFICANT CHANGE UP (ref 7–23)
CALCIUM SERPL-MCNC: 8.3 MG/DL — LOW (ref 8.4–10.5)
CHLORIDE SERPL-SCNC: 95 MMOL/L — LOW (ref 96–108)
CO2 SERPL-SCNC: 25 MMOL/L — SIGNIFICANT CHANGE UP (ref 22–31)
CREAT SERPL-MCNC: 4.59 MG/DL — HIGH (ref 0.5–1.3)
GAS PNL BLDV: SIGNIFICANT CHANGE UP
GLUCOSE SERPL-MCNC: 132 MG/DL — HIGH (ref 70–99)
HCT VFR BLD CALC: 23.3 % — LOW (ref 39–50)
HGB BLD-MCNC: 7.4 G/DL — LOW (ref 13–17)
INR BLD: 1.61 RATIO — HIGH (ref 0.88–1.16)
MAGNESIUM SERPL-MCNC: 2 MG/DL — SIGNIFICANT CHANGE UP (ref 1.6–2.6)
MCHC RBC-ENTMCNC: 29.5 PG — SIGNIFICANT CHANGE UP (ref 27–34)
MCHC RBC-ENTMCNC: 31.8 GM/DL — LOW (ref 32–36)
MCV RBC AUTO: 92.8 FL — SIGNIFICANT CHANGE UP (ref 80–100)
NRBC # BLD: 0 /100 WBCS — SIGNIFICANT CHANGE UP (ref 0–0)
PHOSPHATE SERPL-MCNC: 2.3 MG/DL — LOW (ref 2.5–4.5)
PLATELET # BLD AUTO: 661 K/UL — HIGH (ref 150–400)
POTASSIUM SERPL-MCNC: 3.5 MMOL/L — SIGNIFICANT CHANGE UP (ref 3.5–5.3)
POTASSIUM SERPL-SCNC: 3.5 MMOL/L — SIGNIFICANT CHANGE UP (ref 3.5–5.3)
PROCALCITONIN SERPL-MCNC: 2.26 NG/ML — HIGH (ref 0.02–0.1)
PROT SERPL-MCNC: 5.2 G/DL — LOW (ref 6–8.3)
PROTHROM AB SERPL-ACNC: 18.6 SEC — HIGH (ref 10–12.9)
RBC # BLD: 2.51 M/UL — LOW (ref 4.2–5.8)
RBC # FLD: 16.6 % — HIGH (ref 10.3–14.5)
SODIUM SERPL-SCNC: 134 MMOL/L — LOW (ref 135–145)
WBC # BLD: 14.7 K/UL — HIGH (ref 3.8–10.5)
WBC # FLD AUTO: 14.7 K/UL — HIGH (ref 3.8–10.5)

## 2020-01-24 PROCEDURE — 99232 SBSQ HOSP IP/OBS MODERATE 35: CPT | Mod: GC

## 2020-01-24 PROCEDURE — 99223 1ST HOSP IP/OBS HIGH 75: CPT

## 2020-01-24 PROCEDURE — 99232 SBSQ HOSP IP/OBS MODERATE 35: CPT

## 2020-01-24 PROCEDURE — 71045 X-RAY EXAM CHEST 1 VIEW: CPT | Mod: 26

## 2020-01-24 RX ORDER — ASPIRIN/CALCIUM CARB/MAGNESIUM 324 MG
81 TABLET ORAL DAILY
Refills: 0 | Status: DISCONTINUED | OUTPATIENT
Start: 2020-01-24 | End: 2020-02-02

## 2020-01-24 RX ORDER — OXYCODONE HYDROCHLORIDE 5 MG/1
5 TABLET ORAL EVERY 4 HOURS
Refills: 0 | Status: DISCONTINUED | OUTPATIENT
Start: 2020-01-24 | End: 2020-01-31

## 2020-01-24 RX ORDER — APIXABAN 2.5 MG/1
5 TABLET, FILM COATED ORAL
Refills: 0 | Status: DISCONTINUED | OUTPATIENT
Start: 2020-01-24 | End: 2020-02-01

## 2020-01-24 RX ORDER — OXYCODONE HYDROCHLORIDE 5 MG/1
10 TABLET ORAL EVERY 6 HOURS
Refills: 0 | Status: DISCONTINUED | OUTPATIENT
Start: 2020-01-24 | End: 2020-01-31

## 2020-01-24 RX ORDER — APIXABAN 2.5 MG/1
2.5 TABLET, FILM COATED ORAL ONCE
Refills: 0 | Status: COMPLETED | OUTPATIENT
Start: 2020-01-24 | End: 2020-01-24

## 2020-01-24 RX ORDER — ACETAMINOPHEN 500 MG
1000 TABLET ORAL ONCE
Refills: 0 | Status: COMPLETED | OUTPATIENT
Start: 2020-01-24 | End: 2020-01-24

## 2020-01-24 RX ADMIN — Medication 1 TABLET(S): at 11:01

## 2020-01-24 RX ADMIN — CHLORHEXIDINE GLUCONATE 1 APPLICATION(S): 213 SOLUTION TOPICAL at 05:35

## 2020-01-24 RX ADMIN — BRIMONIDINE TARTRATE 1 DROP(S): 2 SOLUTION/ DROPS OPHTHALMIC at 05:35

## 2020-01-24 RX ADMIN — BRIMONIDINE TARTRATE 1 DROP(S): 2 SOLUTION/ DROPS OPHTHALMIC at 14:07

## 2020-01-24 RX ADMIN — APIXABAN 5 MILLIGRAM(S): 2.5 TABLET, FILM COATED ORAL at 18:23

## 2020-01-24 RX ADMIN — GABAPENTIN 100 MILLIGRAM(S): 400 CAPSULE ORAL at 17:34

## 2020-01-24 RX ADMIN — BRIMONIDINE TARTRATE 1 DROP(S): 2 SOLUTION/ DROPS OPHTHALMIC at 21:12

## 2020-01-24 RX ADMIN — Medication 100 MILLIGRAM(S): at 11:01

## 2020-01-24 RX ADMIN — APIXABAN 2.5 MILLIGRAM(S): 2.5 TABLET, FILM COATED ORAL at 05:35

## 2020-01-24 RX ADMIN — MICAFUNGIN SODIUM 105 MILLIGRAM(S): 100 INJECTION, POWDER, LYOPHILIZED, FOR SOLUTION INTRAVENOUS at 15:19

## 2020-01-24 RX ADMIN — GABAPENTIN 100 MILLIGRAM(S): 400 CAPSULE ORAL at 05:35

## 2020-01-24 RX ADMIN — MEROPENEM 100 MILLIGRAM(S): 1 INJECTION INTRAVENOUS at 05:35

## 2020-01-24 RX ADMIN — LATANOPROST 1 DROP(S): 0.05 SOLUTION/ DROPS OPHTHALMIC; TOPICAL at 11:03

## 2020-01-24 RX ADMIN — AMIODARONE HYDROCHLORIDE 200 MILLIGRAM(S): 400 TABLET ORAL at 05:35

## 2020-01-24 RX ADMIN — Medication 400 MILLIGRAM(S): at 18:24

## 2020-01-24 RX ADMIN — Medication 1000 MILLIGRAM(S): at 18:39

## 2020-01-24 RX ADMIN — AMIODARONE HYDROCHLORIDE 200 MILLIGRAM(S): 400 TABLET ORAL at 17:34

## 2020-01-24 RX ADMIN — HYDROMORPHONE HYDROCHLORIDE 0.5 MILLIGRAM(S): 2 INJECTION INTRAMUSCULAR; INTRAVENOUS; SUBCUTANEOUS at 00:00

## 2020-01-24 RX ADMIN — APIXABAN 2.5 MILLIGRAM(S): 2.5 TABLET, FILM COATED ORAL at 08:54

## 2020-01-24 RX ADMIN — MEROPENEM 100 MILLIGRAM(S): 1 INJECTION INTRAVENOUS at 17:34

## 2020-01-24 RX ADMIN — Medication 81 MILLIGRAM(S): at 11:01

## 2020-01-24 NOTE — PROGRESS NOTE ADULT - ASSESSMENT
49 year old male with ESRD on HD, now s/p ddrt on 12/8/2019 course complicated by TMA/profound ATN, who presents from a HD center with a fever and cough. He was initially admitted with influenza. He is s/p washout of infected collection and biopsy which revealed ATN.  He was found to have diffusely enlarging perinephric fluid collection.    While on telemetry, he had an episode of a wide complex tachycardia. It initially appears irregular, suggesting an atrial arrhythmia with aberrancy, though the remainder appears more like an episode of non-sustained VT.   s/p washout on 1/1/2020 with worsening sepsis and transfer to ICU, s/p IR drainage of perinephric collection on 1/6. 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 He underwent operative repair of right external iliac artery with pericardial patch, transplant nephrectomy, RLE thrombectomy, and IVC filter on 1/9.     Now s/p corrina ->tachy arrest with tdp, af rvr, now on amio, with large PE    - critical events noted. Now off of pressor support. Bp has improved overall  - developed malignant arrhythmias which seemed to start with bradycardia, leading to more tachycardia and bursts of VT and rapid AF.  The apparent af degenerated into torsades.  He was shocked and has been maintaining SR since  - cont amio at 200 daily. No further events over last 4 days  - hold metoprolol in the setting of borderline BP.  - repeat ekg daily to evaluate qtc, while on antifungals/amiodarone  - arrhythmias were most likely triggered by the massive vte event, are unlikely to represent a primary cardiac issue  - hs trop noted, though in the context of his arrhythmias, and right heart strain from the pe, the trop is more likely to reflect demand than an acs event  - ideally would be on asa, statin when able     - now on apixiban  - echo with preserved/hyperdynamic lv, with RV failure consistent with acute RV overload  - CTPA on 1/21 demonstrated some resolution of PE and evolving pulmonary infarcts.   - Further cardiac workup will depend on clinical course.  - will follow with you

## 2020-01-24 NOTE — PROGRESS NOTE ADULT - SUBJECTIVE AND OBJECTIVE BOX
Follow Up:  infected hematoma    Interval History: febrile overnight. denies chills. notes improved RLQ pain. denies cough. denies dysuria. denies diarrhea.     REVIEW OF SYSTEMS  [  ] ROS unobtainable because:    [ x ] All other systems negative except as noted below    Constitutional:  [ x] fever [ ] chills  [ ] weight loss  [ ] weakness  Skin:  [ ] rash [ ] phlebitis	  Eyes: [ ] icterus [ ] pain  [ ] discharge	  ENMT: [ ] sore throat  [ ] thrush [ ] ulcers [ ] exudates  Respiratory: [ ] dyspnea [ ] hemoptysis [ ] cough [ ] sputum	  Cardiovascular:  [ ] chest pain [ ] palpitations [ ] edema	  Gastrointestinal:  [ ] nausea [ ] vomiting [ ] diarrhea [ ] constipation [ ] pain	  Genitourinary:  [ ] dysuria [ ] frequency [ ] hematuria [ ] discharge [ ] flank pain  [ ] incontinence  Musculoskeletal:  [ ] myalgias [ ] arthralgias [ ] arthritis  [ ] back pain  Neurological:  [ ] headache [ ] seizures  [ ] confusion/altered mental status    Allergies  coconut (Anaphylaxis)  morphine (Pruritus; Rash)        ANTIMICROBIALS:  meropenem  IVPB 500 every 12 hours  micafungin IVPB 100 every 24 hours      OTHER MEDS:  MEDICATIONS  (STANDING):  acetaminophen   Tablet .. 975 every 6 hours PRN  allopurinol 100 daily  aMIOdarone    Tablet 200 two times a day  apixaban 5 two times a day  aspirin  chewable 81 daily  epoetin trey Injectable 54249 <User Schedule>  gabapentin 100 every 12 hours  oxyCODONE    IR 10 every 6 hours PRN  oxyCODONE    IR 5 every 4 hours PRN      Vital Signs Last 24 Hrs  T(C): 38.2 (24 Jan 2020 16:00), Max: 39.3 (23 Jan 2020 19:25)  T(F): 100.8 (24 Jan 2020 16:00), Max: 102.8 (23 Jan 2020 19:25)  HR: 65 (24 Jan 2020 16:00) (55 - 83)  BP: 86/54 (24 Jan 2020 16:00) (71/41 - 155/72)  BP(mean): 66 (24 Jan 2020 16:00) (55 - 103)  RR: 17 (24 Jan 2020 16:00) (11 - 23)  SpO2: 97% (24 Jan 2020 16:00) (95% - 100%)    PHYSICAL EXAMINATION:  General: Alert and Awake, NAD  HEENT: PERRL, EOMI  Neck: Supple  Cardiac: RRR, No M/R/G  Resp: CTAB, No Wh/Rh/Ra  Abdomen: +RLQ drain with serosanguinous drainage. +RLQ wound vac over transplant nephrectomy site, NBS, mild tenderness to palpation over RLQ  MSK: 1-2+ RLE edema. No Calf tenderness  Skin: No rashes or lesions. Skin is warm and dry to the touch.   Neuro: Alert and Awake. CN 2-12 Grossly intact. Moves all four extremities spontaneously.  Psych: Calm, Pleasant, Cooperative                          7.4    14.70 )-----------( 661      ( 24 Jan 2020 01:39 )             23.3       01-24    134<L>  |  95<L>  |  15  ----------------------------<  132<H>  3.5   |  25  |  4.59<H>    Ca    8.3<L>      24 Jan 2020 01:39  Phos  2.3     01-24  Mg     2.0     01-24    TPro  5.2<L>  /  Alb  1.8<L>  /  TBili  1.1  /  DBili  0.7<H>  /  AST  21  /  ALT  5<L>  /  AlkPhos  290<H>  01-24          MICROBIOLOGY:  v  .Body Fluid RLQ Fluid Collection  01-24-20   Testing in progress  --  --      .Body Fluid Abdominal Fluid  01-22-20   No growth  --    polymorphonuclear leukocytes seen  Gram Negative Rods seen  by cytocentrifuge      .Blood Blood  01-21-20   No growth to date.  --  --      .Blood Blood  01-21-20   No growth to date.  --  --      .Sputum Sputum, trap  01-18-20   Normal Respiratory Gillian present  --    No polymorphonuclear leukocytes per low power field  Rare Squamous epithelial cells per low power field  Rare Gram positive cocci in pairs per oil power field      .Blood Blood  01-16-20   No growth at 5 days.  --  --      .Body Fluid Abdominal Fluid  01-15-20   No growth at 5 days  --    polymorphonuclear leukocytes seen  No organisms seen  by cytocentrifuge      .Tissue Other  01-10-20   No growth at 5 days  --  Enterobacter cloacae (Carbapenem Resistant)      .Blood Blood-Venous  01-07-20   No growth at 5 days.  --  --      .Urine Catheterized  01-07-20   <10,000 CFU/mL Normal Urogenital Gillian  --  --      Abdominal Fl Abdominal Fluid  01-06-20   No growth at 5 days  --    No polymorphonuclear leukocytes seen  No organisms seen  by cytocentrifuge      .Urine Clean Catch (Midstream)  01-05-20   >100,000 CFU/ml Enterobacter cloacae  --  Enterobacter cloacae      .Blood Blood  01-04-20   No growth at 5 days.  --  --      .Tissue Other  01-01-20   Numerous Enterobacter cloacae complex  --  Enterobacter cloacae complex    .Surgical Swab collection around right kidney  01-01-20   Moderate Enterobacter cloacae complex  --  Enterobacter cloacae complex    .Body Fluid periphrenic collection  01-01-20   **Please Note**: This is a Corrected Report**  Moderate Enterobacter cloacae complex  Moderate Escherichia coli  Previously reported as:  Moderate Enterobacter cloacae (Carbapenem Resistant)  Moderate Escherichia coli  --  Escherichia coli  Enterobacter cloacae complex    .Surgical Swab right kidney abscess  01-01-20   Moderate Enterobacter cloacae complex  --  Enterobacter cloacae complex    .Urine Clean Catch (Midstream)  12-29-19   >100,000 CFU/ml Enterobacter cloacae  --  Enterobacter cloacae    .Blood Blood-Peripheral  12-27-19   No growth at 5 days.  --  --    Rapid RVP Result: NotDetec (01-23 @ 21:40)    RADIOLOGY:    <The imaging below has been reviewed and visualized me independently>    EXAM:  CT ABDOMEN AND PELVIS IC                        EXAM:  CT ANGIO CHEST (W)AW IC                        PROCEDURE DATE:  01/21/2020    Pulmonary emboli within the right lower lobar arteries and left lower lobar arteries extending into the distal segments as seen on prior study. The pulmonary emboli seen in the right and left pulmonary arteries are no longer visualized.  New bilateral wedgelike opacities in the lower lobes, which may represent pulmonary infarcts.  Interval decrease in size in the right lower quadrant fluid collection.

## 2020-01-24 NOTE — PROGRESS NOTE ADULT - ATTENDING COMMENTS
Dr. Philippe (Attending Physician)  Pain well controlled will dc dilaudid  Bilateral PEs and pulm infarcts  ESRD s/p explanted kidney  Enterobacter infection on meropenem, remains on micafungin will discuss dc with transplant team  febrile overnight blood cx sent wbc downtrending Dr. Philippe (Attending Physician)  Pain well controlled will dc dilaudid  Bilateral PEs and pulm infarcts now on eliquis  ESRD s/p explanted kidney HD tu/th/sat  Enterobacter infection on meropenem, remains on micafungin will discuss dc with transplant team  febrile overnight blood cx sent wbc downtrending

## 2020-01-24 NOTE — PROGRESS NOTE ADULT - ASSESSMENT
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 RVP with Influenza A s/p Tamiflu treatment course. Now with infected carlos-transplant hematoma.    1/1: s/p Ex-Lap and washout of infected hematoma (Enterobacter from cultures)  1/6: s/p IR guided drainage attempt of hematoma on 1/6 (NGTD from cultures)  1/10: s/p transplant nephrectomy, repair of right external iliac artery with a bovine pericardial patch, repair of right external iliac vein and thrombectomy of right lower extremity veins (Surgical cultures with Enterobacter)  1/15: s/p IR guided drainage (220cc of hematoma)  1/22: s/p IR drain repositioning and upsizing (Gram stain with GNR but so far NGTD)    Some of the Enterobacter isolates R and S to Ertapenem  per core lab Ertapenem at borderline of Susceptible and Resistant breakpoint  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   I suspect Enterobacter is the primary pathogen    PEA arrest and Torsades on 1/13  CT C/A/P (1/13) with Saddle PE, interval increase in size of RP fluid collection (infected hematoma)  CT C/A/P (1/21) with pulmonary infarcts and residual hematoma (decreased in size)    Despite high grade fevers patients white count is improving, procalcitonin decreasing and clinically otherwise improving. Suspect fevers secondary to either the pulmonary infarcts or from the residual hematoma/collection.   Fluid sampling from 1/22 drain repositioning/upsize with GNR on gram stain.   So far no growth to date. Anticipate Enterobacter if there is growth    Overall, Saddle pulmonary embolus, Fever (worsening trend), Leukocytosis (increasing), Infected Hematoma, Positive Urine Culture, Influenza Infection, Renal Transplant Recipient    --Can check CMV Serum PCR  --If clinical decompensation would add Vancomycin and Amikacin pending BCx from this AM  --Continue Meropenem 500 mg IV Q24H  --Would consider discontinuing Micafungin - no evidence of yeast in prior cultures.   --Continue to follow CBC with diff  --Continue to follow temperature curve  --Follow up on preliminary blood cultures   --Follow up on preliminary 1/22 IR cultures   --Management of Saddle PE per 8ICU team and vascular teams    I will be away over this upcoming weekend. Please contact the Infectious Diseases Office with any further questions or concerns.     Dada Jacobo M.D.  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 RVP with Influenza A s/p Tamiflu treatment course. Now with infected carlos-transplant hematoma.    1/1: s/p Ex-Lap and washout of infected hematoma (Enterobacter from cultures)  1/6: s/p IR guided drainage attempt of hematoma on 1/6 (NGTD from cultures)  1/10: s/p transplant nephrectomy, repair of right external iliac artery with a bovine pericardial patch, repair of right external iliac vein and thrombectomy of right lower extremity veins (Surgical cultures with Enterobacter)  1/15: s/p IR guided drainage (220cc of hematoma)  1/22: s/p IR drain repositioning and upsizing (Gram stain with GNR but so far NGTD)    Some of the Enterobacter isolates R and S to Ertapenem  per core lab Ertapenem at borderline of Susceptible and Resistant breakpoint  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   I suspect Enterobacter is the primary pathogen    PEA arrest and Torsades on 1/13  CT C/A/P (1/13) with Saddle PE, interval increase in size of RP fluid collection (infected hematoma)  CT C/A/P (1/21) with pulmonary infarcts and residual hematoma (decreased in size)    Despite high grade fevers patients white count is improving, procalcitonin decreasing and clinically otherwise improving. Suspect fevers secondary to either the pulmonary infarcts or from the residual hematoma/collection.   Fluid sampling from 1/22 drain repositioning/upsize with GNR on gram stain.   So far no growth to date. Anticipate Enterobacter if there is growth    Overall, Saddle pulmonary embolus, Fever (worsening trend), Leukocytosis (increasing), Infected Hematoma, Positive Urine Culture, Renal Transplant Recipient    --Can check CMV Serum PCR  --If clinical decompensation would add Vancomycin and Amikacin pending BCx from this AM  --Continue Meropenem 500 mg IV Q24H  --Would consider discontinuing Micafungin - no evidence of yeast in prior cultures.   --Continue to follow CBC with diff  --Continue to follow temperature curve  --Follow up on preliminary blood cultures   --Follow up on preliminary 1/22 IR cultures   --Management of Saddle PE per 8ICU team and vascular teams    I will be away over this upcoming weekend. Please contact the Infectious Diseases Office with any further questions or concerns.     Dada Jacobo M.D.  [facility] Division of Infectious Disease  8AM-5PM: Pager Number [phone]  After Hours (or if no response): Please contact the Infectious Diseases Office at [phone]

## 2020-01-24 NOTE — PROGRESS NOTE ADULT - ASSESSMENT
49 yr old man with ESRD s/p DDRT in Dec 2019 with complicated post operative course, now s/p graft nephrectomy on 1/10/20 back on hemodialysis off all immunosuppression.  Also has massive PE/DVT on Eliquis now.

## 2020-01-24 NOTE — PROGRESS NOTE ADULT - SUBJECTIVE AND OBJECTIVE BOX
[facility] - Melissa Kelsey, Enrique, Maximino, Marin, Ravi Goodson  Office Number:  [phone]    Patient resting comfortably in bed in NAD.  Laying flat with no respiratory distress.  No complaints of chest pain, dyspnea, palpitations, PND, or orthopnea.  He is sitting in a chair this AM    F/U for:  VT arrest    Telemetry:  SR and SB    MEDICATIONS  (STANDING):  allopurinol 100 milliGRAM(s) Oral daily  aMIOdarone    Tablet 200 milliGRAM(s) Oral two times a day  apixaban 2.5 milliGRAM(s) Oral every 12 hours  brimonidine 0.2% Ophthalmic Solution 1 Drop(s) Both EYES three times a day  chlorhexidine 2% Cloths 1 Application(s) Topical <User Schedule>  epoetin trey Injectable 11909 Unit(s) IV Push <User Schedule>  gabapentin 100 milliGRAM(s) Oral every 12 hours  latanoprost 0.005% Ophthalmic Solution 1 Drop(s) Both EYES daily  meropenem  IVPB 500 milliGRAM(s) IV Intermittent every 12 hours  micafungin IVPB 100 milliGRAM(s) IV Intermittent every 24 hours  Nephro-candace 1 Tablet(s) Oral daily    MEDICATIONS  (PRN):  acetaminophen   Tablet .. 975 milliGRAM(s) Oral every 6 hours PRN Mild Pain (1 - 3)  HYDROmorphone  Injectable 0.5 milliGRAM(s) IV Push every 3 hours PRN Severe Pain (7 - 10)  lidocaine 2% Jelly 1 milliLiter(s) IntraUrethral two times a day PRN irritation  oxyCODONE    IR 5 milliGRAM(s) Oral every 4 hours PRN Moderate Pain (4 - 6)      Allergies    coconut (Anaphylaxis)  morphine (Pruritus; Rash)    Intolerances        Vital Signs Last 24 Hrs  T(C): 36.8 (24 Jan 2020 03:00), Max: 39.3 (23 Jan 2020 19:25)  T(F): 98.3 (24 Jan 2020 03:00), Max: 102.8 (23 Jan 2020 19:25)  HR: 68 (24 Jan 2020 06:00) (55 - 85)  BP: 136/73 (24 Jan 2020 06:00) (71/41 - 149/73)  BP(mean): 99 (24 Jan 2020 06:00) (55 - 102)  RR: 15 (24 Jan 2020 06:00) (11 - 23)  SpO2: 100% (24 Jan 2020 06:00) (95% - 100%)    I&O's Summary    22 Jan 2020 07:01  -  23 Jan 2020 07:00  --------------------------------------------------------  IN: 349 mL / OUT: 120 mL / NET: 229 mL    23 Jan 2020 07:01  -  24 Jan 2020 06:43  --------------------------------------------------------  IN: 1207 mL / OUT: 2440 mL / NET: -1233 mL        ON EXAM:    Constitutional: well-nourished, well-developed, NAD   HEENT:  MMM, sclerae anicteric, conjunctivae clear, no oral cyanosis.  Pulmonary: Non-labored, breath sounds are clear bilaterally, No wheezing, rales or rhonchi  Cardiovascular: Regular, S1 and S2.  1-2/6 apical sys murmur.  No rubs, gallops or clicks  Gastrointestinal: Bowel Sounds present, soft, nontender.   Lymph: jolynn peripheral edema, legs wrapped.   Neurological: Alert, no focal deficits  Skin: No rashes.  Psych:  Mood & affect appropriate      LABS: All Labs Reviewed:                        7.4    14.70 )-----------( 661      ( 24 Jan 2020 01:39 )             23.3                         7.1    14.79 )-----------( 670      ( 23 Jan 2020 21:42 )             22.7                         7.6    18.46 )-----------( 679      ( 23 Jan 2020 01:14 )             23.8     24 Jan 2020 01:39    134    |  95     |  15     ----------------------------<  132    3.5     |  25     |  4.59   23 Jan 2020 21:42    134    |  96     |  14     ----------------------------<  107    3.7     |  24     |  4.21   23 Jan 2020 01:14    130    |  93     |  24     ----------------------------<  94     3.4     |  26     |  6.40     Ca    8.3        24 Jan 2020 01:39  Ca    8.3        23 Jan 2020 21:42  Ca    8.5        23 Jan 2020 01:14  Phos  2.3       24 Jan 2020 01:39  Phos  2.2       23 Jan 2020 21:42  Phos  3.2       23 Jan 2020 01:14  Mg     2.0       24 Jan 2020 01:39  Mg     2.0       23 Jan 2020 21:42  Mg     2.2       23 Jan 2020 01:14    TPro  5.2    /  Alb  1.8    /  TBili  1.1    /  DBili  0.7    /  AST  21     /  ALT  5      /  AlkPhos  290    24 Jan 2020 01:39  TPro  5.1    /  Alb  1.7    /  TBili  1.0    /  DBili  x      /  AST  19     /  ALT  6      /  AlkPhos  267    23 Jan 2020 21:42  TPro  5.6    /  Alb  2.1    /  TBili  1.1    /  DBili  0.7    /  AST  14     /  ALT  <5     /  AlkPhos  243    23 Jan 2020 01:14    PT/INR - ( 24 Jan 2020 01:39 )   PT: 18.6 sec;   INR: 1.61 ratio         PTT - ( 24 Jan 2020 01:39 )  PTT:38.5 sec      Blood Culture: Organism --  Gram Stain Blood -- Gram Stain   polymorphonuclear leukocytes seen  Gram Negative Rods seen  by cytocentrifuge  Specimen Source .Body Fluid Abdominal Fluid  Culture-Blood --    Organism --  Gram Stain Blood -- Gram Stain --  Specimen Source .Blood Blood  Culture-Blood --    Organism --  Gram Stain Blood -- Gram Stain --  Specimen Source .Blood Blood  Culture-Blood --

## 2020-01-24 NOTE — PROGRESS NOTE ADULT - SUBJECTIVE AND OBJECTIVE BOX
[facility] DIVISION OF KIDNEY DISEASES AND HYPERTENSION -- FOLLOW UP NOTE  --------------------------------------------------------------------------------  HPI: 49 yr old man with ESRD s/p DDRT in Dec 2019 complicated by DGF due to ATN and early TMA thought to be due to prograf. Was on belatacept/MMF/Pred immunosuppression. He was admitted with influenza, course complicated by perinephric hematoma complicated by enterobacter infection s/p surgical exploration and wash out. He developed DVT started on heparin drip, subsequent gross hematuria. He was taken to the OR and found to have dehiscence of the ureter-bladder anastomosis as well as vascular anastomosis. He underwent graft nephrectomy on 1/10/20 with bovine biopatch repair of the EIA. On 1/13/20 he had cardiac arrest due to large pulmonary embolism s/p successful resuscitation. S/p bedside drainage of RLQ collection on 1/15/20 and upsized drain on 1/22/20.     Pt. seen and examined at bedside this AM. Febrile in past 24 hours. Had HD treatment with UF (2.2L) yesterday. Pt. feels mildly improved.  PAST HISTORY  --------------------------------------------------------------------------------  No significant changes to PMH, PSH, FHx, SHx, unless otherwise noted    ALLERGIES & MEDICATIONS  --------------------------------------------------------------------------------  Allergies    coconut (Anaphylaxis)  morphine (Pruritus; Rash)    Intolerances    Standing Inpatient Medications  allopurinol 100 milliGRAM(s) Oral daily  aMIOdarone    Tablet 200 milliGRAM(s) Oral two times a day  apixaban 2.5 milliGRAM(s) Oral every 12 hours  brimonidine 0.2% Ophthalmic Solution 1 Drop(s) Both EYES three times a day  chlorhexidine 2% Cloths 1 Application(s) Topical <User Schedule>  epoetin trey Injectable 87941 Unit(s) IV Push <User Schedule>  gabapentin 100 milliGRAM(s) Oral every 12 hours  latanoprost 0.005% Ophthalmic Solution 1 Drop(s) Both EYES daily  meropenem  IVPB 500 milliGRAM(s) IV Intermittent every 12 hours  micafungin IVPB 100 milliGRAM(s) IV Intermittent every 24 hours  Nephro-candace 1 Tablet(s) Oral daily    REVIEW OF SYSTEMS  --------------------------------------------------------------------------------  Gen: + lethargy, + fatigue  Respiratory: No dyspnea  CV: No chest pain  GI: No abdominal pain  MSK: + LE edema  Neuro: No dizziness  Heme: No bleeding    All other systems were reviewed and are negative, except as noted.  VITALS/PHYSICAL EXAM  --------------------------------------------------------------------------------  T(C): 36.8 (01-24-20 @ 03:00), Max: 39.3 (01-23-20 @ 19:25)  HR: 68 (01-24-20 @ 06:00) (55 - 85)  BP: 136/73 (01-24-20 @ 06:00) (71/41 - 149/73)  RR: 15 (01-24-20 @ 06:00) (11 - 23)  SpO2: 100% (01-24-20 @ 06:00) (95% - 100%)  Wt(kg): --    01-22-20 @ 07:01  -  01-23-20 @ 07:00  --------------------------------------------------------  IN: 349 mL / OUT: 120 mL / NET: 229 mL    01-23-20 @ 07:01  -  01-24-20 @ 06:40  --------------------------------------------------------  IN: 1207 mL / OUT: 2440 mL / NET: -1233 mL    Physical Exam:  	Gen: NAD, resting  	HEENT: supple neck  	Pulm: decreased breath sounds bilaterally  	CV: RRR, S1S2; no rub  	Abd: +BS, soft, RLQ with drain in place  	: No suprapubic tenderness  	UE: RUE swelling  	LE: b/l pitting edema, R>L (improved)    LABS/STUDIES  --------------------------------------------------------------------------------              7.4    14.70 >-----------<  661      [01-24-20 @ 01:39]              23.3     134  |  95  |  15  ----------------------------<  132      [01-24-20 @ 01:39]  3.5   |  25  |  4.59        Ca     8.3     [01-24-20 @ 01:39]      Mg     2.0     [01-24-20 @ 01:39]      Phos  2.3     [01-24-20 @ 01:39]    Creatinine Trend:  SCr 4.59 [01-24 @ 01:39]  SCr 4.21 [01-23 @ 21:42]  SCr 6.40 [01-23 @ 01:14]  SCr 4.72 [01-22 @ 00:38]  SCr 7.26 [01-21 @ 00:26]

## 2020-01-24 NOTE — PROGRESS NOTE ADULT - PROBLEM SELECTOR PLAN 4
Leukocytosis improving, at 14 today. Febrile in past 24 hours.   Continue Meropenem and Micafungin.  ID team follow up

## 2020-01-24 NOTE — PROGRESS NOTE ADULT - PROBLEM SELECTOR PLAN 1
Volume overload improving. Electrolyte balance is acceptable (mild hyponatremia likely 2/2 volume overload). Last HD treatment on 1/23/20, tolerating 2.2 L of UF. Monitor labs

## 2020-01-24 NOTE — PROGRESS NOTE ADULT - ASSESSMENT
50 y/o male presenting with influenza A with a hospital course complicated by:  - Infected transplant perinephric hematoma s/p right groin exploration, washout, & biopsy c/b persistent perinephric collection s/p FNA  - Right external iliac & common femoral DVTs s/p IVC filter & thrombectomy of the RLE veins  - Gross hematuria w/ pseudoaneurysm of the right external iliac & transplant renal artery seen on IR angiogram s/p right groin exploration, washout of the RP space, and transplant nephrectomy w/ repair of the right external iliac artery w/ a bovine patch  - Persistent right iliac fossa collection s/p IR drainage  - Cardiac arrest secondary to a saddle PE    PLAN:    Neuro: acute post-op pain  - Monitor mental status  - Pain control with acetaminophen, oxycodone, Dilaudid, and gabapentin    Resp: saddle PE with pulmonary infarct sequela  - Monitor pulse oximeter  - Supportive cares and continued anticoagulation for infarction. Daily CXRs.  - OOB/amb/encourage IS to prevent atelectasis    CV: obstructive shock secondary to PE (resolved), s/p PEA arrest with progression to torsades  - Monitor vital signs  - Amiodarone for episode of torsades  - Currently holding home metoprolol 25 mg PO q12hrs for HTN, home hydralazine 50 mg PO BID.    GI: no acute issues  - Regular diet as tolerated  - Bowel regimen with senna & Miralax  - upsized IR drain for R iliac fossa    Renal: CKD stage V s/p DDRT c/b DGF & perinephric collection s/p right groin exploration, washout, & biopsy c/b persistent perinephric collection s/p FNA; gross hematuria w/ pseudoaneurysm of the right external iliac & transplant renal artery s/p right groin exploration, washout of the RP space, & transplant nephrectomy w/ repair of the right external iliac artery w/ a bovine patch; persistent right iliac fossa collection s/p IR drainage  -  s/p HD with -2.0 L of ultrafiltrate removed.    Heme: anemia of chronic diseases, saddle PE, right external iliac & common femoral DVTs s/p IVC filter  - Monitor CBC and coags  - Heparin infusion for anticoagulation for DVTs and PE  - Epogen resumed    ID: Enterobacter cloacae UTI, transplant perinephric collection and right iliac fossa collection with carbapenem resistant Enterobacter cloacae (CRE) and E. coli.  - Sputum on 1/18 with normal respiratory harvey  - Blood cultures from 1/16 no growth to date, 1/21 no growth to date.  - Right iliac fossa collection culture from 1/15 with no growth.  - RP hematoma culture from 1/10 with carbapenem resistant Enterobacter cloacae  - On Meropenem and micafungin  - Trending Procalcitonin, and monitoring for fevers +/- leukocytosis.    Endo: No acute issues  - Monitor glucose on BMP    Misc: Gout, glaucoma  - Home allopurinol for gout  - Home eyedrops (latanoprost, dorzolamide, & brimonidine) for glaucoma  - PT/OT    Disposition:  - Full code  - Will remain in SICU 48 y/o male presenting with influenza A with a hospital course complicated by:  - Infected transplant perinephric hematoma s/p right groin exploration, washout, & biopsy c/b persistent perinephric collection s/p FNA  - Right external iliac & common femoral DVTs s/p IVC filter & thrombectomy of the RLE veins  - Gross hematuria w/ pseudoaneurysm of the right external iliac & transplant renal artery seen on IR angiogram s/p right groin exploration, washout of the RP space, and transplant nephrectomy w/ repair of the right external iliac artery w/ a bovine patch  - Persistent right iliac fossa collection s/p IR drainage  - Cardiac arrest secondary to a saddle PE    PLAN:    Neuro: acute post-op pain  - Monitor mental status  - Pain control with acetaminophen, oxycodone, Dilaudid, and gabapentin  d  Resp: saddle PE with pulmonary infarct sequela  - Monitor pulse oximeter  - Supportive cares and continued anticoagulation for infarction. Daily CXRs.  - OOB/amb/encourage IS to prevent atelectasis    CV: obstructive shock secondary to PE (resolved), s/p PEA arrest with progression to torsades  - Monitor vital signs  - Amiodarone for episode of torsades  - Currently holding home metoprolol 25 mg PO q12hrs for HTN, home hydralazine 50 mg PO BID.    GI: no acute issues  - Regular diet as tolerated  - Bowel regimen with senna & Miralax  - upsized IR drain for R iliac fossa    Renal: CKD stage V s/p DDRT c/b DGF & perinephric collection s/p right groin exploration, washout, & biopsy c/b persistent perinephric collection s/p FNA; gross hematuria w/ pseudoaneurysm of the right external iliac & transplant renal artery s/p right groin exploration, washout of the RP space, & transplant nephrectomy w/ repair of the right external iliac artery w/ a bovine patch; persistent right iliac fossa collection s/p IR drainage  -  s/p HD with -2.0 L of ultrafiltrate removed.    Heme: anemia of chronic diseases, saddle PE, right external iliac & common femoral DVTs s/p IVC filter  - Monitor CBC and coags  - Heparin infusion for anticoagulation for DVTs and PE  - Epogen resumed    ID: Enterobacter cloacae UTI, transplant perinephric collection and right iliac fossa collection with carbapenem resistant Enterobacter cloacae (CRE) and E. coli.  - Sputum on 1/18 with normal respiratory harvey  - Blood cultures from 1/16 no growth to date, 1/21 no growth to date.  - Right iliac fossa collection culture from 1/15 with no growth.  - RP hematoma culture from 1/10 with carbapenem resistant Enterobacter cloacae  - On Meropenem and micafungin  - Trending Procalcitonin, and monitoring for fevers +/- leukocytosis.    Endo: No acute issues  - Monitor glucose on BMP    Misc: Gout, glaucoma  - Home allopurinol for gout  - Home eyedrops (latanoprost, dorzolamide, & brimonidine) for glaucoma  - PT/OT    Disposition:  - Full code  - Will remain in SICU

## 2020-01-24 NOTE — PROGRESS NOTE ADULT - ATTENDING COMMENTS
49 yr old man with ESRD s/p DDRT in Dec 2019 with complicated post operative course, now s/p graft nephrectomy on 1/10/20 back on hemodialysis off all immunosuppression.  Also has massive PE/DVT and is on a heparin drip.    1. ESRD on dialysis. IHD was done yesterday .will benefit from daily IUF/IHD. will d/w primary nephrologist( Dr Amaya)  2. DVT/Saddle PE -  CTA on 1/21/20 : there are pulmonary emboli within the right lower lobar arteries and the left lower lobar arteries extending into the distal segments as seen on prior study. The pulmonary emboli seen in the right and left pulmonary arteries are normal longer visualized.   Started on Eliquis yesterday. will increase to 5mg po bid  3. Bacteremia- CT A/P on 1/21/19 shows a 6.9 x 3.9 cm RLQ fluid collection. s/p replacement and upsizing of tube by IR on 1/23/19 . Fluid culture sent, pending results. Continue Meropenem and Micafungin 100mg daily. Will likely need long term antibiotics. Consider midine or PICC line placement.  4. Anemia -Continue Epogen with dialysis

## 2020-01-24 NOTE — PROGRESS NOTE ADULT - PROBLEM SELECTOR PLAN 2
Started on Eliquis 2.5 mg BID. Started on Eliquis 2.5 mg BID. Start ASA 81 mg today in the setting of thrombocytosis and recent recent blood clots.

## 2020-01-24 NOTE — PROGRESS NOTE ADULT - PROBLEM SELECTOR PLAN 3
ECHO 1/15/20 - improving right heart strain.   Hemodynamically stable. Started on Eliquis 2.5 mg BID. ECHO 1/15/20 - improving right heart strain.   Hemodynamically stable. Started on Eliquis 2.5 mg BID. Start ASA 81 mg today.

## 2020-01-24 NOTE — PROGRESS NOTE ADULT - SUBJECTIVE AND OBJECTIVE BOX
Transplant Surgery - Multidisciplinary Rounds  --------------------------------------------------------------  R DDRT    Date:  12/8/19     Present:   Patient seen with multidisciplinary team including Transplant Surgeon: Dr. eB, Dr. Velásquez, Dr. Espinal, Transplant Nephrologist: Dr. BRITT Puckett, Nephrology fellow Abel Flores, NP: Aislinn Hunt and MAEVE tom and surgical resident Mehdi Bess during am rounds and examined with Dr. Be. Disciplines not in attendance will be notified of the plan.     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 Young, followed by Dr. Raphael, [facility] Oncologist), RA with hand contractures. He underwent DDRT on 12/8/19. Post op course c/b DGF requiring HD, thrombocytopenia, elevated LDH and haptoglobin. Schistocytes  on peripheral smear concerning for TMA. Envarsus held. Received Belatacept 12/13. Started on PLEX (12/12, 12/15). Underwent renal bx on 12/13 c/w profound ATN.  Discharged home on 12/20/19 w/ outpatient HD.     Re-admitted 12/27 with influenza, completed treatement with Tamiflu on 12/31. Urine cx on admission grew Ent cloacae, was started on meropenem 12/31. Renal US on admission with increasing hematoma.   ·	OR 1/1 for ex-lap, hematoma evacuation, washout and renal bx. (c/w ATN)  OR cxs grew CRE and e coli. Post op course c/b:   ·	fevers and leukocytosis, CT a/p (1/4) with increased perinephric collection. Underwent IR guided collection 1/6   ·	New onset hematuria, martinez inserted 1/7, started CBI  ·	RLE edema, RLE doppler (1/8) with acute above the knee thrombus of R external iliac, common femoral and femoral veins, Acute calf vein thrombus affecting R soleal vein, Heparin gtt initiated   ·	AMS 1/9  (head CT neg), hyponatremic  ·	Significant hematuria and bleeding around the martinez. Angio 1/9 showed actively bleeding pseudoaneurysm at the anastomosis of the transplant renal artery with the recipient iliac artery. A stent was placed in the iliac artery to  control hemorrhage. IVC filter placed.   ·	Emergent OR 1/9-1/10 for graft nephrectomy;  ureter to bladder anastomosis was completely disrupted, repaired the R external iliac artery with bovine pericardial patch, repaired right external iliac vein, performed thrombectomy of RLE veins, Intra op received 3u PRBC.   ·	Extubated 1/11  ·	1/13 Saddle PE, with right heart strain and complicated by PEA arrest followed by Torsade, ROSC achieved after ACLS; re-intubated  ·	1/15 Renal US with 13.7 x 5.0 x 1.5 cm in RLQ transplant bed; s/p bedside IR guided drainage, pigtail in place. Fluid cx to date with NG  ·	1/21 CT C/A/P showing persistent collection, catheter at superior portion of collection  ·	1/22 IR pigtail drain repositioned and upsized to 12Fr catheter w/ increased output  ·	1/23 Transitioned to Eliquis from Heparin drip    Interval events:    - TMax 39.3. Pan cultured. Continued on Meropenem and Micafugin  - Added Fungal culture to IR body fluid culture from 1/22 - pending results  - Improved Right swelling  - Thrombocytosis 661  - HD -2.2L  - Transitioned to Eliquis 2.5mg PO BID from Heparin drip    cxs:   12/29: Urine Cx: Enterobacter Cloacae  1/1 OR Perinephric collection Cx:  Carbapenem resistant Ent Cloacae, E. Coli  1/1: R kidney abscess swab: Enterobacter Cloacae  1/1  OR tissue: Enterobacter Cloacae  1/5: Urine Cx: Enteropbacter Cloacae  1/5: Skin incision Cx (bedside): NG  1/6: Adominal fluid Cx from IR:  NG   1/7: Urine Cx:  NG  1/7: BCX2:  NG  1/10 retroperit hematoma - enerobacter   1/10 bladder hematoma - enterobacter  1/15 Drainage fluid - NGTD  1/16: BC X2: NGTD  1/18: SPutum: Normal harvey  1/21: BC X2: NTGD  1/22: IR body fluid Cx: ngtd    Potential Discharge date: pending clinical improvement      MEDICATIONS  (STANDING):  allopurinol 100 milliGRAM(s) Oral daily  aMIOdarone    Tablet 200 milliGRAM(s) Oral two times a day  apixaban 5 milliGRAM(s) Oral two times a day  aspirin  chewable 81 milliGRAM(s) Oral daily  brimonidine 0.2% Ophthalmic Solution 1 Drop(s) Both EYES three times a day  chlorhexidine 2% Cloths 1 Application(s) Topical <User Schedule>  epoetin trey Injectable 07050 Unit(s) IV Push <User Schedule>  gabapentin 100 milliGRAM(s) Oral every 12 hours  latanoprost 0.005% Ophthalmic Solution 1 Drop(s) Both EYES daily  meropenem  IVPB 500 milliGRAM(s) IV Intermittent every 12 hours  micafungin IVPB 100 milliGRAM(s) IV Intermittent every 24 hours  Nephro-candace 1 Tablet(s) Oral daily    MEDICATIONS  (PRN):  acetaminophen   Tablet .. 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  Carpal tunnel syndrome  Brain cyst: had MRI recently- now gone  Anemia  Gastric ulcer: Long time ago  Rheumatoid arthritis  Renal transplant recipient  Breakdown (mechanical) of penile (implanted) prosthesis, sequela  End-stage renal disease (ESRD): PERMACATH PLACEMENT  Abscess of left buttock: s/p I &amp; D  AV fistula: placement left lower arm  S/P cystoscopy: stent placement &amp; removal for kidney stones, lithiotripsy  s/p Lt Carpal tunnel: 2011      Vital Signs Last 24 Hrs  T(C): 37.6 (24 Jan 2020 07:00), Max: 39.3 (23 Jan 2020 19:25)  T(F): 99.7 (24 Jan 2020 07:00), Max: 102.8 (23 Jan 2020 19:25)  HR: 71 (24 Jan 2020 09:00) (55 - 85)  BP: 145/70 (24 Jan 2020 09:00) (71/41 - 149/73)  BP(mean): 100 (24 Jan 2020 09:00) (55 - 102)  RR: 18 (24 Jan 2020 09:00) (11 - 23)  SpO2: 99% (24 Jan 2020 09:00) (95% - 100%)    I&O's Summary    23 Jan 2020 07:01  -  24 Jan 2020 07:00  --------------------------------------------------------  IN: 1207 mL / OUT: 2440 mL / NET: -1233 mL                              7.4    14.70 )-----------( 661      ( 24 Jan 2020 01:39 )             23.3     01-24    134<L>  |  95<L>  |  15  ----------------------------<  132<H>  3.5   |  25  |  4.59<H>    Ca    8.3<L>      24 Jan 2020 01:39  Phos  2.3     01-24  Mg     2.0     01-24    TPro  5.2<L>  /  Alb  1.8<L>  /  TBili  1.1  /  DBili  0.7<H>  /  AST  21  /  ALT  5<L>  /  AlkPhos  290<H>  01-24          Culture - Body Fluid with Gram Stain (collected 01-22-20 @ 22:15)  Source: .Body Fluid Abdominal Fluid  Gram Stain (01-23-20 @ 03:51):    polymorphonuclear leukocytes seen    Gram Negative Rods seen    by cytocentrifuge  Preliminary Report (01-23-20 @ 21:46):    No growth    Culture - Blood (collected 01-21-20 @ 05:11)  Source: .Blood Blood  Preliminary Report (01-22-20 @ 06:01):    No growth to date.    Culture - Blood (collected 01-21-20 @ 03:02)  Source: .Blood Blood  Preliminary Report (01-22-20 @ 04:01):    No growth to date.    Culture - Sputum (collected 01-18-20 @ 15:26)  Source: .Sputum Sputum, trap  Gram Stain (01-18-20 @ 20:56):    No polymorphonuclear leukocytes per low power field    Rare Squamous epithelial cells per low power field    Rare Gram positive cocci in pairs per oil power field  Final Report (01-20-20 @ 18:14):    Normal Respiratory Harvey present      EXAM:  CT ABDOMEN AND PELVIS IC    EXAM:  CT ANGIO CHEST (W)AW IC                        PROCEDURE DATE:  01/21/2020    INTERPRETATION:  CLINICAL INFORMATION: Reevaluate pulmonary embolus  COMPARISON: CTA chest 1/13/2020    PROCEDURE:   CT Angiography of the Chest was performed followed by portal venous phase imaging of the Abdomen and Pelvis.  IV Contrast: 90 ml of Omnipaque 350 was injected intravenously. 10 ml were discarded.  Oral contrast: None.  Sagittal and coronal reformats were performed as well as 3D (MIP) reconstructions.  FINDINGS:  CHEST:     There are pulmonary emboli within the right lower lobar arteries and the left lower lobar arteries extending into the distal segments as seen on prior study. The pulmonary emboli seen in the right and left pulmonary arteries are normal longer visualized.    The heart size is normal. No pericardial effusion. There are a few mediastinal lymph nodes. No lymphadenopathy.    No endobronchial lesions. Bilateral wedgelike opacities in the lower lobes. Trace right pleural effusion.    Calcification within the left thyroid lobe. The upper abdomen is unremarkable.    Age indeterminate right-sided rib fractures. Chronic left-sided rib fractures.    ABDOMEN AND PELVIS:    The liver is unremarkable. Mild periportal edema. Vicarious excretion of contrast within the gallbladder. Surgical clips seen at the pancreatic tail. The spleen is not visualized.    The adrenal glands are unremarkable. Subcentimeter low attenuating lesions within the right kidney.    The left kidney is unremarkable. No hydronephrosis. The urinary bladder is collapsed around Martinez catheter balloon. The prostate is normal in size.  Bilateral corpora cavernosum implants.    No bowel obstruction. Normal appendix.    Calcified plaques within the abdominal aorta. Status post infrarenal IVC filter placement. Poor opacification of the IVC limiting evaluation for thrombus surrounding the IVC filter.    There is a right lower quadrant drainage catheter traversing a right lower quadrant fluid collection in the peritoneum. The tip of this changed catheters correlate within the fluid collection, which measures 6.9 x 3.9 cm, previously measuring 9.5 x 5.0 cm.    Anasarca.    Degenerative changes.    IMPRESSION:     Pulmonary emboli within the right lower lobar arteries and left lower lobar arteries extending into the distal segments as seen on prior study. The pulmonary emboli seen in the right and left pulmonary arteries are no longer visualized.    New bilateral wedgelike opacities in the lower lobes, which may represent pulmonary infarcts.    Interval decrease in size in the right lower quadrant fluid collection.        REVIEW OF SYSTEMS  Gen: + weakness   Skin: No rashes  Head/Eyes/Ears/Mouth: No headache; Normal hearing; Normal vision w/o blurriness; No sinus pain/discomfort, sore throat  Respiratory: no SOB  CV: No chest pain, PND, orthopnea  GI: incisional tenderness   : anuric  MSK: + lower extrem edema, RUE edema, flank edema  Neuro: No dizziness/lightheadedness, weakness, seizures, numbness, tingling  Heme: No easy bruising or bleeding  Endo: No heat/cold intolerance  Psych: No significant nervousness, anxiety, stress, depression    PHYSICAL EXAM:    Constitutional: NAD  	Eyes: Anicteric, PERRLA  	Respiratory: + crackles bases  	Cardiovascular: RRR  	Gastrointestinal: Soft abdomen, appropriate incisional TTP, ND.  wound vac ss/murky output, RLQ drain with SS drainage    Genitourinary:  anuric    Extremities: b/l 2-3+ lower ext edema ace wrapped, flank edema, RUE edema slightly improved   	Vascular: DP signal bilateral, L AVF palpable  	Neurological: A&O x3.  	Skin: no new lesions/ulcerations  	Musculoskeletal: Moving all extremities

## 2020-01-24 NOTE — CONSULT NOTE ADULT - SUBJECTIVE AND OBJECTIVE BOX
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 Young, followed by Dr. Raphael, [facility] Oncologist), RA with hand contractures. 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 Post op course c/b DGF requiring HD, thrombocytopenia, elevated LDH and Haptoglobulin. Schistocytes  on peripheral smear concerning for TMA. Envarsus held. Received Belatacept 12/13. Started on PLEX (12/12, 12/15). Underwent renal bx on 12/13 c/w profound ATN.  Found to have much improvement to levels, likely related to Tacrolimus. He was discharged home on 12/20/19 w/ outpatient HD.     He was now sent to ED by dialysis center after he was found to be febrile to 102, he did not get HD this AM, and last full HD session was on 12/26. Upon arrival, he was febrile to 101.7, other vitals were stable. He states that he had a cough that began 5-6 days ago that has now mostly resolved. He reports no other febrile episodes other than this morning. He admits to having one SOB episode earlier this week that resolved after he got HD. He also states that his mother was hospitalized earlier this week and was flu+. Currently makes less than 1 cup urine/daily. He currently denies SOB, CP, dyspnea, HA, dizziness, N/V, diarrhea, constipation.     Pt was found to have the flu and positive urine culture with Enterobacter. ID was consulted and pt was started on Tamiflu and Meropenam. Transplant team and Nephrology was following patient for HD. Pt was taken to the OR on 1/1 for evacuation of perinephretic hematoma which was infected with enterobacter and biopsy of transplanted kidney. Biopsy revealed ATN. Pt was recommended to continue on Meropenam as per ID. Pt also had a CT guided pelvic fluid aspiration by IR on 1/6. Post procedure pt had significant hematuria with clots for which urology was consulted. Pt placed on CBI and hematuria resolved. Pt had multiple antibioics and antifungals added as per ID recommendations to due to rising WBC. Cardiology was also following patient closely. On 1/8 pt was found to have RLE DVT involving ext iliac, common fem veins. for which vascular was following and started on heparin gtt. Hospital course complicated by confusion, delirium hyponatremia. Pt was taken back to the OR on 1/10 for evacuation of RP hematoma removal of external iliac artery stent, bovine pericardial patch repair of ext iliac artery, and ligation of external iliac vein. Pt was emergently taken to the OR after IR for explantation of transplanted kidney and repair of bladder perforation by transplant surgery. Vascular surgery called intra-op and repaired the right external iliac artery was repaired with a bovine pericardial patch, repaired the right external iliac vein, performed a thrombectomy of the right lower extremity veins and had an IVC filter placed.  Hospital course was further complicated by cardiac arrest due to massive acute saddle pulmonary embolism with acute cor pulmonale and acute DVT of the brachial vein in RUE. Pt was started on Eliquis. Cardiology was following for multiple maliganat arrhythmias.       REVIEW OF SYSTEMS  Constitutional - No fever, No weight loss, No fatigue  HEENT - No eye pain, No visual disturbances, No difficulty hearing, No tinnitus, No vertigo, No neck pain  Respiratory - No cough, No wheezing, No shortness of breath  Cardiovascular - No chest pain, No palpitations  Gastrointestinal - No abdominal pain, No nausea, No vomiting, No diarrhea, No constipation  Genitourinary - No dysuria, No frequency, No hematuria, No incontinence  Neurological - No headaches, No memory loss, No loss of strength, No numbness, No tremors  Skin - No itching, No rashes, No lesions   Endocrine - No temperature intolerance  Musculoskeletal - No joint pain, No joint swelling, No muscle pain  Psychiatric - No depression, No anxiety    PAST MEDICAL & SURGICAL HISTORY  Erectile dysfunction  Glaucoma  Gout  HTN (hypertension)  ESRD (end stage renal disease) on dialysis  Contracture of finger joint  Carpal tunnel syndrome  Brain cyst  Anemia  Renal insufficiency  Gastric ulcer  Rheumatoid arthritis  Renal transplant recipient  Breakdown (mechanical) of penile (implanted) prosthesis, sequela  End-stage renal disease (ESRD)  Abscess of left buttock  AV fistula  S/P cystoscopy  s/p Lt Carpal tunnel  Rheumatoid arthritis      SOCIAL HISTORY  Smoking - Denied  EtOH - Denied   Drugs - Denied    PRIOR FUNCTIONAL HISTORY  Ambulation: independent  ADLs: independent  pt was driving prior    Previous Home Equipment: denies    HOME ENVIRONMENT:  Type of Home: private house  Stairs: 4 steps to enter  Living With: family (children and spouse)  Caregiver's availability:     Special Needs or Precautions:  Weight bearing status: WBAT    FAMILY HISTORY   Family history of myocardial infarction in first degree male relative  Family history of rheumatoid arthritis  Family history of hypertension in mother  Family history of cerebral aneurysm (Sibling)      RECENT LABS/IMAGING      VITALS  T(C): 37.9 (01-24-20 @ 11:00), Max: 39.3 (01-23-20 @ 19:25)  HR: 81 (01-24-20 @ 10:55) (55 - 85)  BP: 144/75 (01-24-20 @ 11:00) (71/41 - 149/73)  RR: 20 (01-24-20 @ 11:00) (11 - 23)  SpO2: 100% (01-24-20 @ 11:00) (95% - 100%)  Wt(kg): --    ALLERGIES  coconut (Anaphylaxis)  morphine (Pruritus; Rash)      MEDICATIONS   acetaminophen   Tablet .. 975 milliGRAM(s) Oral every 6 hours PRN  allopurinol 100 milliGRAM(s) Oral daily  aMIOdarone    Tablet 200 milliGRAM(s) Oral two times a day  apixaban 5 milliGRAM(s) Oral two times a day  aspirin  chewable 81 milliGRAM(s) Oral daily  brimonidine 0.2% Ophthalmic Solution 1 Drop(s) Both EYES three times a day  chlorhexidine 2% Cloths 1 Application(s) Topical <User Schedule>  epoetin trey Injectable 05205 Unit(s) IV Push <User Schedule>  gabapentin 100 milliGRAM(s) Oral every 12 hours  latanoprost 0.005% Ophthalmic Solution 1 Drop(s) Both EYES daily  meropenem  IVPB 500 milliGRAM(s) IV Intermittent every 12 hours  micafungin IVPB 100 milliGRAM(s) IV Intermittent every 24 hours  Nephro-candace 1 Tablet(s) Oral daily  oxyCODONE    IR 10 milliGRAM(s) Oral every 6 hours PRN  oxyCODONE    IR 5 milliGRAM(s) Oral every 4 hours PRN      ----------------------------------------------------------------------------------------  PHYSICAL EXAM    CURRENT FUNCTIONAL STATUS  PT 1/24  Bed Mobility  Bed Mobility Training Sit-to-Supine: moderate assist (50% patient effort);  supervision;  verbal cues;  1 person assist  Bed Mobility Training Limitations: decreased flexibility;  decreased strength;  impaired balance;  pain;  decreased ability to use legs for bridging/pushing    Sit-Stand Transfer Training  Transfer Training Sit-to-Stand Transfer: moderate assist (50% patient effort);  verbal cues;  supervision;  1 person assist;  full weight-bearing  Transfer Training Stand-to-Sit Transfer: moderate assist (50% patient effort);  supervision;  verbal cues;  1 person assist;  full weight-bearing  Sit-to-Stand Transfer Training Transfer Safety Analysis: decreased balance;  decreased flexibility;  decreased strength;  impaired balance;  pain    Gait Training  Gait Training: moderate assist (50% patient effort);  supervision;  verbal cues;  1 person assist;  full weight-bearing   rolling walker;  10 feet;  15 feet  Gait Analysis: swing-through gait   decreased quinton;  decreased step length;  decreased stride length;  crouch;  decreased flexibility;  impaired balance;  decreased strength;  impaired postural control;  10 feet;  15 feet;  rolling walker  Gait Number of Times:: x 2  Type of Rest Type of Rest: sitting  Duration of Rest Duration of Rest: 7 mins     OT 1/21  Transfer: Chair to Bed:     · Level of [place]	minimum assist (75% patients effort); moderate assist (50% patients effort)	  		  · Physical Assist/Nonphysical Assist	1 person assist	  · Assistive Device	rolling walker	    Bed to Chair Safety Analysis:     · Impairments Contributing to Impaired Transfers	impaired balance; pain; decreased strength	    Transfer: Sit to Stand:     · Level of [place]	moderate assist (50% patients effort)	  		  · Physical Assist/Nonphysical Assist	1 person assist	  · Assistive Device	rolling walker	    Transfer: Stand to Sit:     · Level of [place]	moderate assist (50% patients effort)	  · Physical Assist/Nonphysical Assist	1 person assist	  · Assistive Device	rolling walker	    Sit/Stand Transfer Safety Analysis:     · Impairments Contributing to Impaired Transfers	impaired balance; pain; decreased strength	    Sensory Examination:     Light Touch Sensation:   · Left UE	within normal limits	  · Right UE	within normal limits	  · Left LE	within normal limits	  · Right LE	within normal limits	      Visual Assessment:    Visual Assessment:    Visual Assessment:   · Visual Acuity	not tested; hx glaucoma	  · Visual Tracking	normal	  · Visual Neglect	none	  · Visual Field Cuts	none	  · Eye Muscle Balance	normal	  · Visual Scanning	WFL	  · Visual Convergence	normal	    Fine Motor Coordination:     Fine Motor Coordination:   · Left Hand Thumb/Finger Opposition Skills	normal performance	  · Right Hand Thumb/Finger Opposition Skills	moderate impairment  hx arthritis	    Lower Body Dressing Training:     · Level of [place]	maximum assist (25% patients effort); sock management	    Grooming Training:     · Level of [place]	supervision; while seated, face washing.	  · Physical Assist/Nonphysical Assist	set-up required	      ----------------------------------------------------------------------------------------  ASSESSMENT/PLAN    49y Male with functional deficits after    Pain - Tylenol  DVT PPX - SCDs  Rehab -   Continue bedside therapy as well as OOB throughout the day with mobilization by staff to maintain cardiopulmonary function and prevention of secondary complications related to debility.     Will continue to follow for ongoing rehab needs and recommendations. . Functional progress will determine ongoing rehab dispo recommendations, which may change. 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 Young, followed by Dr. Raphael, [facility] Oncologist), RA with hand contractures. 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 Post op course c/b DGF requiring HD, thrombocytopenia, elevated LDH and Haptoglobulin. Schistocytes  on peripheral smear concerning for TMA. Envarsus held. Received Belatacept 12/13. Started on PLEX (12/12, 12/15). Underwent renal bx on 12/13 c/w profound ATN.  Found to have much improvement to levels, likely related to Tacrolimus. He was discharged home on 12/20/19 w/ outpatient HD.     He was now sent to ED by dialysis center after he was found to be febrile to 102, he did not get HD this AM, and last full HD session was on 12/26. Upon arrival, he was febrile to 101.7, other vitals were stable. He states that he had a cough that began 5-6 days ago that has now mostly resolved. He reports no other febrile episodes other than this morning. He admits to having one SOB episode earlier this week that resolved after he got HD. He also states that his mother was hospitalized earlier this week and was flu+. Currently makes less than 1 cup urine/daily. He currently denies SOB, CP, dyspnea, HA, dizziness, N/V, diarrhea, constipation.     Pt was found to have the flu and positive urine culture with Enterobacter. ID was consulted and pt was started on Tamiflu and Meropenam. Transplant team and Nephrology was following patient for HD. Pt was taken to the OR on 1/1 for evacuation of perinephretic hematoma which was infected with enterobacter and biopsy of transplanted kidney. Biopsy revealed ATN. Pt was recommended to continue on Meropenam as per ID. Pt also had a CT guided pelvic fluid aspiration by IR on 1/6. Post procedure pt had significant hematuria with clots for which urology was consulted. Pt placed on CBI and hematuria resolved. Pt had multiple antibioics and antifungals added as per ID recommendations to due to rising WBC. Cardiology was also following patient closely. On 1/8 pt was found to have RLE DVT involving ext iliac, common fem veins. for which vascular was following and started on heparin gtt. Hospital course complicated by confusion, delirium hyponatremia. Pt was taken back to the OR on 1/10 for evacuation of RP hematoma removal of external iliac artery stent, bovine pericardial patch repair of ext iliac artery, and ligation of external iliac vein. Pt was emergently taken to the OR after IR for explantation of transplanted kidney and repair of bladder perforation by transplant surgery. Vascular surgery called intra-op and repaired the right external iliac artery was repaired with a bovine pericardial patch, repaired the right external iliac vein, performed a thrombectomy of the right lower extremity veins and had an IVC filter placed.  Hospital course was further complicated by cardiac arrest due to massive acute saddle pulmonary embolism with acute cor pulmonale and acute DVT of the brachial vein in RUE. Pt was started on Eliquis. Cardiology was following for multiple maliganat arrhythmias.       REVIEW OF SYSTEMS  Constitutional - No fever, + fatigue  HEENT - No eye pain, No visual disturbances, No difficulty hearing,  Respiratory - No cough, No wheezing, No shortness of breath  Cardiovascular - No chest pain, No palpitations  Gastrointestinal - + abdominal pain, No nausea, No vomiting, No diarrhea, No constipation  Genitourinary - No dysuria, No frequency,   Neurological - No headaches, No memory loss, + loss of strength, No numbness,  Skin - No itching, No rashes,   Musculoskeletal - No joint pain, + joint swelling, +muscle pain  Psychiatric - No depression, No anxiety    PAST MEDICAL & SURGICAL HISTORY  Erectile dysfunction  Glaucoma  Gout  HTN (hypertension)  ESRD (end stage renal disease) on dialysis  Contracture of finger joint  Carpal tunnel syndrome  Brain cyst  Anemia  Renal insufficiency  Gastric ulcer  Rheumatoid arthritis  Renal transplant recipient  Breakdown (mechanical) of penile (implanted) prosthesis, sequela  End-stage renal disease (ESRD)  Abscess of left buttock  AV fistula  S/P cystoscopy  s/p Lt Carpal tunnel  Rheumatoid arthritis      SOCIAL HISTORY  Smoking - occasional cigarette use  EtOH - socially  Drugs - Denied    PRIOR FUNCTIONAL HISTORY  Ambulation: independent  ADLs: independent  pt was driving prior    Previous Home Equipment: denies    HOME ENVIRONMENT:  Type of Home: 1 floor private house  Stairs: 4 steps to enter in back of house / 8 steps to enter in front of house / no steps inside  Living With: family (mother and son)  Caregiver's availability: yes    Special Needs or Precautions:  Weight bearing status: WBAT    FAMILY HISTORY   Family history of myocardial infarction in first degree male relative  Family history of rheumatoid arthritis  Family history of hypertension in mother  Family history of cerebral aneurysm (Sibling)      RECENT LABS/IMAGING                        7.4    14.70 )-----------( 661      ( 24 Jan 2020 01:39 )             23.3     01-24    134<L>  |  95<L>  |  15  ----------------------------<  132<H>  3.5   |  25  |  4.59<H>    Ca    8.3<L>      24 Jan 2020 01:39  Phos  2.3     01-24  Mg     2.0     01-24    TPro  5.2<L>  /  Alb  1.8<L>  /  TBili  1.1  /  DBili  0.7<H>  /  AST  21  /  ALT  5<L>  /  AlkPhos  290<H>  01-24  PT/INR - ( 24 Jan 2020 01:39 )   PT: 18.6 sec;   INR: 1.61 ratio    PTT - ( 24 Jan 2020 01:39 )  PTT:38.5 sec      VITALS  T(C): 37.9 (01-24-20 @ 11:00), Max: 39.3 (01-23-20 @ 19:25)  HR: 81 (01-24-20 @ 10:55) (55 - 85)  BP: 144/75 (01-24-20 @ 11:00) (71/41 - 149/73)  RR: 20 (01-24-20 @ 11:00) (11 - 23)  SpO2: 100% (01-24-20 @ 11:00) (95% - 100%)  Wt(kg): --    ALLERGIES  coconut (Anaphylaxis)  morphine (Pruritus; Rash)      MEDICATIONS   acetaminophen   Tablet .. 975 milliGRAM(s) Oral every 6 hours PRN  allopurinol 100 milliGRAM(s) Oral daily  aMIOdarone    Tablet 200 milliGRAM(s) Oral two times a day  apixaban 5 milliGRAM(s) Oral two times a day  aspirin  chewable 81 milliGRAM(s) Oral daily  brimonidine 0.2% Ophthalmic Solution 1 Drop(s) Both EYES three times a day  chlorhexidine 2% Cloths 1 Application(s) Topical <User Schedule>  epoetin trey Injectable 57657 Unit(s) IV Push <User Schedule>  gabapentin 100 milliGRAM(s) Oral every 12 hours  latanoprost 0.005% Ophthalmic Solution 1 Drop(s) Both EYES daily  meropenem  IVPB 500 milliGRAM(s) IV Intermittent every 12 hours  micafungin IVPB 100 milliGRAM(s) IV Intermittent every 24 hours  Nephro-candace 1 Tablet(s) Oral daily  oxyCODONE    IR 10 milliGRAM(s) Oral every 6 hours PRN  oxyCODONE    IR 5 milliGRAM(s) Oral every 4 hours PRN      ----------------------------------------------------------------------------------------  PHYSICAL EXAM  Constitutional - NAD, Comfortable, Sleeping comfortably but arousable   HEENT - NCAT, EOMI  Chest - CTA b/l  No wheezing  Cardiovascular - S1S2   Abdomen - Soft, ND, + mild tenderness  Extremities - b/l LE with significant edema and b/l LE ace wraped  Neurologic Exam -                    Cognitive - AAOx3, follows commands appropriately      Communication - Fluent, No dysarthria     Cranial Nerves - CN 2-12 grossly intact, no facial asymmetry no ptosis, shoulder shrug intact, tongue midline, EOMI, PERRL     Motor -                     LEFT    UE - ShAB 5/5, EF 5/5, EE 5/5,  5/5                    RIGHT UE - ShAB 5/5, EF 5/5, EE 5/5,  5/5                    LEFT    LE - HF 2/5,  DF 4/5, PF 4/5                    RIGHT LE - HF 2/5,  DF 4/5, PF 4/5        Sensory - Intact to LT     Reflexes - DTR Intact,   Psychiatric - Mood stable, Affect WNL  ----------------------------------------------------------------------------------------  CURRENT FUNCTIONAL STATUS  PT 1/24  Bed Mobility  Bed Mobility Training Sit-to-Supine: moderate assist (50% patient effort);  supervision;  verbal cues;  1 person assist  Bed Mobility Training Limitations: decreased flexibility;  decreased strength;  impaired balance;  pain;  decreased ability to use legs for bridging/pushing    Sit-Stand Transfer Training  Transfer Training Sit-to-Stand Transfer: moderate assist (50% patient effort);  verbal cues;  supervision;  1 person assist;  full weight-bearing  Transfer Training Stand-to-Sit Transfer: moderate assist (50% patient effort);  supervision;  verbal cues;  1 person assist;  full weight-bearing  Sit-to-Stand Transfer Training Transfer Safety Analysis: decreased balance;  decreased flexibility;  decreased strength;  impaired balance;  pain    Gait Training  Gait Training: moderate assist (50% patient effort);  supervision;  verbal cues;  1 person assist;  full weight-bearing   rolling walker;  10 feet;  15 feet  Gait Analysis: swing-through gait   decreased quinton;  decreased step length;  decreased stride length;  crouch;  decreased flexibility;  impaired balance;  decreased strength;  impaired postural control;  10 feet;  15 feet;  rolling walker  Gait Number of Times:: x 2  Type of Rest Type of Rest: sitting  Duration of Rest Duration of Rest: 7 mins     OT 1/21  Transfer: Chair to Bed:     · Level of [place]	minimum assist (75% patients effort); moderate assist (50% patients effort)	  		  · Physical Assist/Nonphysical Assist	1 person assist	  · Assistive Device	rolling walker	    Bed to Chair Safety Analysis:     · Impairments Contributing to Impaired Transfers	impaired balance; pain; decreased strength	    Transfer: Sit to Stand:     · Level of [place]	moderate assist (50% patients effort)	  		  · Physical Assist/Nonphysical Assist	1 person assist	  · Assistive Device	rolling walker	    Transfer: Stand to Sit:     · Level of [place]	moderate assist (50% patients effort)	  · Physical Assist/Nonphysical Assist	1 person assist	  · Assistive Device	rolling walker	    Sit/Stand Transfer Safety Analysis:     · Impairments Contributing to Impaired Transfers	impaired balance; pain; decreased strength	    Sensory Examination:     Light Touch Sensation:   · Left UE	within normal limits	  · Right UE	within normal limits	  · Left LE	within normal limits	  · Right LE	within normal limits	      Visual Assessment:    Visual Assessment:    Visual Assessment:   · Visual Acuity	not tested; hx glaucoma	  · Visual Tracking	normal	  · Visual Neglect	none	  · Visual Field Cuts	none	  · Eye Muscle Balance	normal	  · Visual Scanning	WFL	  · Visual Convergence	normal	    Fine Motor Coordination:     Fine Motor Coordination:   · Left Hand Thumb/Finger Opposition Skills	normal performance	  · Right Hand Thumb/Finger Opposition Skills	moderate impairment  hx arthritis	    Lower Body Dressing Training:     · Level of [place]	maximum assist (25% patients effort); sock management	    Grooming Training:     · Level of [place]	supervision; while seated, face washing.	  · Physical Assist/Nonphysical Assist	set-up required	      ----------------------------------------------------------------------------------------  ASSESSMENT/PLAN    49y Male with functional deficits after presenting with fevers with complicated hospital course requiring multiple procedures as above    Perinephritic infected hematoma - s/p nephrectomy, management as per ID and transplant team , currently on meropenam, micafungin  ESRD on HD - management as per nephrology  S/p cardiac arrest with multiple arrhythmia - cardiology following closely pt on amiodarone and ASA  Pain - Tylenol, Oxycodone prn, gabapentin   DVT/PE- SCDs, Eliquis 5mg BID  Diet - Renal restrictions  Rehab -   PT- ROM, Bed Mobility, transfers, amb w AD, GCEs  OT- ADLs, energy conservation  [place],   Continue bedside therapy as well as OOB throughout the day with mobilization by staff to maintain cardiopulmonary function and prevention of secondary complications related to debility.     Recommend ACUTE inpatient rehabilitation for the functional deficits consisting of 3 hours of therapy/day & 24 hour RN/daily PMR physician for comorbid medical management. Will continue to follow for ongoing rehab needs and recommendations. Patient will be able to tolerate 3 hours a day.    Will continue to follow for ongoing rehab needs and recommendations. . Functional progress will determine ongoing rehab dispo recommendations, which may change.

## 2020-01-24 NOTE — PROGRESS NOTE ADULT - SUBJECTIVE AND OBJECTIVE BOX
SURGICAL INTENSIVE CARE UNIT DAILY PROGRESS NOTE    24 HOUR EVENTS:   -- Heparin transitioned to Eliquis  -- S/p dialysis, well-tolerated, -2.0 L  -- Kennedy catheter discontinued.      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 Young, followed by Dr. Raphael, [facility], [place] Oncologist), RA with hand contractures. 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 Post op course c/b DGF requiring HD, thrombocytopenia, elevated LDH and Haptoglobulin. Schistocytes  on peripheral smear concerning for TMA. Envarsus held. Received Belatacept 12/13. Started on PLEX (12/12, 12/15). Underwent renal bx on 12/13 c/w profound ATN.  Found to have much improvement to levels, likely related to Tacrolimus. He was discharged home on 12/20/19 w/ outpatient HD.     He was now sent to ED by dialysis center after he was found to be febrile to 102, he did not get HD this AM, and last full HD session was on 12/26. Upon arrival, he was febrile to 101.7, other vitals were stable. He states that he had a cough that began 5-6 days ago that has now mostly resolved. He reports no other febrile episodes other than this morning. He admits to having one SOB episode earlier this week that resolved after he got HD. He also states that his mother was hospitalized earlier this week and was flu+. Currently makes less than 1 cup urine/daily. He currently denies SOB, CP, dyspnea, HA, dizziness, N/V, diarrhea, constipation.     Recipient info:   CPRA:    0%  ABO:      A+  CMVAb:  Positive  EBVIgG Status:  Positive  Last HD:  12/7/2019    Donor ID:  [id number]  Match: [id number]  OPO: NYRT  Age:  49  ABO:  A  KDPI 81%  COD:  Anoxia  X Clamp Time:  12/7/2019 1538.  Medical Hx: DM, HTN, HLD, obesity BMI 53, CAD, CABAGX3  Terminal Cr:  1/1.8/1.7  CMV- Neg EBV- Neg      NEURO  Exam: awake, alert, oriented  Meds: acetaminophen   Tablet .. 975 milliGRAM(s) Oral every 6 hours PRN Mild Pain (1 - 3)  gabapentin 100 milliGRAM(s) Oral every 12 hours  HYDROmorphone  Injectable 0.5 milliGRAM(s) IV Push every 3 hours PRN Severe Pain (7 - 10)  oxyCODONE    IR 5 milliGRAM(s) Oral every 4 hours PRN Moderate Pain (4 - 6)    [x] Adequacy of sedation and pain control has been assessed and adjusted        RESPIRATORY  RR: 19 (01-24-20 @ 01:00) (11 - 23)  SpO2: 98% (01-24-20 @ 01:00) (95% - 100%)  Wt(kg): --  Exam: Unlabored, CTABL      CARDIOVASCULAR  HR: 66 (01-24-20 @ 01:00) (66 - 85)  BP: 89/53 (01-24-20 @ 01:00) (89/50 - 149/73)  BP(mean): 66 (01-24-20 @ 01:00) (66 - 102)  ABP: --  ABP(mean): --  Wt(kg): --  CVP(cm H2O): --  VBG - ( 22 Jan 2020 02:03 )  pH: 7.46  /  pCO2: 42    /  pO2: 40    / HCO3: 30    / Base Excess: 6.0   /  SaO2: 77     Lactate: 1.2    Exam: regular rate and rhythm  Cardiac Rhythm: sinus  Perfusion     [x]Adequate   [ ]Inadequate  Mentation   [x]Normal       [ ]Reduced  Extremities  [x]Warm         [ ]Cool  Volume Status [ ]Hypervolemic [x]Euvolemic [ ]Hypovolemic  Meds: aMIOdarone    Tablet 200 milliGRAM(s) Oral two times a day      GI/NUTRITION  Exam: Soft, non-tender, non-distended, incision C/D/I  Diet: Regular      GENITOURINARY  I&O's Detail    01-22 @ 07:01  -  01-23 @ 07:00  --------------------------------------------------------  IN:    heparin Infusion: 294 mL    Instillation: 5 mL    Solution: 50 mL  Total IN: 349 mL    OUT:    Bulb: 120 mL  Total OUT: 120 mL    Total NET: 229 mL      01-23 @ 07:01  - 01-24 @ 01:33  --------------------------------------------------------  IN:    heparin Infusion: 42 mL    Oral Fluid: 960 mL    Solution: 150 mL  Total IN: 1152 mL    OUT:    Bulb: 200 mL    Other: 2200 mL  Total OUT: 2400 mL    Total NET: -1248 mL          01-23    134<L>  |  96  |  14  ----------------------------<  107<H>  3.7   |  24  |  4.21<H>    Ca    8.3<L>      23 Jan 2020 21:42  Phos  2.2     01-23  Mg     2.0     01-23    TPro  5.1<L>  /  Alb  1.7<L>  /  TBili  1.0  /  DBili  x   /  AST  19  /  ALT  6<L>  /  AlkPhos  267<H>  01-23      HEMATOLOGIC                        7.1    14.79 )-----------( 670      ( 23 Jan 2020 21:42 )             22.7     PT/INR - ( 23 Jan 2020 01:14 )   PT: 16.0 sec;   INR: 1.38 ratio         PTT - ( 23 Jan 2020 07:11 )  PTT:60.6 sec    INFECTIOUS DISEASES  RECENT CULTURES:  Specimen Source: .Body Fluid Abdominal Fluid  Date/Time: 01-22 @ 22:15  Culture Results:   No growth  Gram Stain:   polymorphonuclear leukocytes seen  Gram Negative Rods seen  by cytocentrifuge  Organism: --  Specimen Source: .Blood Blood  Date/Time: 01-21 @ 05:11  Culture Results:   No growth to date.  Gram Stain: --  Organism: --  Specimen Source: .Blood Blood  Date/Time: 01-21 @ 03:02  Culture Results:   No growth to date.  Gram Stain: --  Organism: -- SURGICAL INTENSIVE CARE UNIT DAILY PROGRESS NOTE    24 HOUR EVENTS:   -- Heparin transitioned to Eliquis  -- S/p dialysis, well-tolerated, -2.0 L  -- Kennedy catheter discontinued.      HISTORY:  48 y/o male with a PMHx of HTN, gout, glaucoma, RA, NET of pancreas s/p robotic distal pancreatectomy & splenectomy (2015 at [place]), and CKD stage V on hemodialysis via LUE AV fistula 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 Patient was admitted and hospital course is as follows:    12/27 - started on Tamiflu, Duplex of the transplanted kidney demonstrated an increase in the perinephric hematoma from 5.7 cm to 9.4 cm  12/29 - patient reporting dysuria so urine culture sent, which was positive for Enterobacter cloacae  01/01 - s/p right groin exploration, washout of the perinephric hematoma, and biopsy of the transplanted kidney, cultures of the hematoma positive for Enterobacter cloacae & E. coli, biopsy demonstrates  01/05 - patient noted to be febrile and hypotensive with new episodes of wide complex tachycardia on telemetry, labs significant for worsening leukocytosis, cultures sent, CT scan obtained & demonstrated a persistent perinephric collection, admitted to SICU for hemodynamic monitoring  01/06 - FNA w/ IR with aspiration of 18 mL of clear & pale yellow fluid but cultures negative for any growth, patient began having gross hematuria requiring CBI  01/07 - repeat imaging demonstrated no change in the size of the perinephric collection  01/08 - RLE edema was noted so a LE Duplex was obtained, which demonstrated a right external iliac & common femoral vein DVT, vascular surgery consulted, patient started on a heparin infusion  01/10 - patient became altered with worsening hematuria despite CBI, patient was taken to IR for an angiogram, which demonstrated a pseudoaneurysm of the transplant renal artery & external iliac artery anastomosis so the pseudoaneurysm was stented & an IVC filter was placed, patient taken to the OR and is s/p right groin exploration, washout of the RP space, transplant nephrectomy w/ repair of the right external iliac artery w/ a bovine patch & removal of the stent placed by IR, and thrombectomy of the RLE veins, returned to SICU still intubated  01/13 - acutely bradycardic and hypotensive progressing into PEA arrest with progression into torsades requiring ACLS for a total of ~12 mins, imaging revealed saddle PE with TTE demonstrating RV strain, PERT team consulted, management with heparin infusion as patient is not a tPA candidate, did not require vasopressors shortly after ROSC  01/15 - ultrasound with large right iliac fossa collection s/p percutaneous drainage by IR, 220 mL of dark red fluid drained and 10 Fr drain was left in placed  01/16 - extubated  01/19 - RUE Duplex with thrombosis of the brachial & cephalic veins, RLQ ultrasound with interval decrease in right iliac fossa collection  01/21 - CTA chest and CT abdomen & pelvis obtained demonstrating known RLL and LLL lobar artery PEs but resolution of PE in bilateral pulmonary arteries, new BLL wedge-like opacities likely representing pulmonary infarcts, and interval decrease in RLQ fluid collection  01/22 - RLQ IR drain upsized by IR from 10 Fr to 12 Fr  01/23 - Changed heparin infusion to Eliquis    SUBJECTIVE/ROS:  [x] A ten-point review of systems was otherwise negative except as noted.  [ ] Due to altered mental status/intubation, subjective information were not able to be obtained from the patient. History was obtained, to the extent possible, from review of the chart and collateral sources of information.    NEURO  Exam: awake, alert, oriented  Meds: acetaminophen   Tablet .. 975 milliGRAM(s) Oral every 6 hours PRN Mild Pain (1 - 3)  gabapentin 100 milliGRAM(s) Oral every 12 hours  HYDROmorphone  Injectable 0.5 milliGRAM(s) IV Push every 3 hours PRN Severe Pain (7 - 10)  oxyCODONE    IR 5 milliGRAM(s) Oral every 4 hours PRN Moderate Pain (4 - 6)    [x] Adequacy of sedation and pain control has been assessed and adjusted        RESPIRATORY  RR: 19 (01-24-20 @ 01:00) (11 - 23)  SpO2: 98% (01-24-20 @ 01:00) (95% - 100%)  Wt(kg): --  Exam: Unlabored, CTABL      CARDIOVASCULAR  HR: 66 (01-24-20 @ 01:00) (66 - 85)  BP: 89/53 (01-24-20 @ 01:00) (89/50 - 149/73)  BP(mean): 66 (01-24-20 @ 01:00) (66 - 102)  ABP: --  ABP(mean): --  Wt(kg): --  CVP(cm H2O): --  VBG - ( 22 Jan 2020 02:03 )  pH: 7.46  /  pCO2: 42    /  pO2: 40    / HCO3: 30    / Base Excess: 6.0   /  SaO2: 77     Lactate: 1.2    Exam: regular rate and rhythm  Cardiac Rhythm: sinus  Perfusion     [x]Adequate   [ ]Inadequate  Mentation   [x]Normal       [ ]Reduced  Extremities  [x]Warm         [ ]Cool  Volume Status [ ]Hypervolemic [x]Euvolemic [ ]Hypovolemic  Meds: aMIOdarone    Tablet 200 milliGRAM(s) Oral two times a day      GI/NUTRITION  Exam: Soft, non-tender, non-distended, incision C/D/I  Diet: Regular      GENITOURINARY  I&O's Detail    01-22 @ 07:01  -  01-23 @ 07:00  --------------------------------------------------------  IN:    heparin Infusion: 294 mL    Instillation: 5 mL    Solution: 50 mL  Total IN: 349 mL    OUT:    Bulb: 120 mL  Total OUT: 120 mL    Total NET: 229 mL      01-23 @ 07:01 - 01-24 @ 01:33  --------------------------------------------------------  IN:    heparin Infusion: 42 mL    Oral Fluid: 960 mL    Solution: 150 mL  Total IN: 1152 mL    OUT:    Bulb: 200 mL    Other: 2200 mL  Total OUT: 2400 mL    Total NET: -1248 mL          01-23    134<L>  |  96  |  14  ----------------------------<  107<H>  3.7   |  24  |  4.21<H>    Ca    8.3<L>      23 Jan 2020 21:42  Phos  2.2     01-23  Mg     2.0     01-23    TPro  5.1<L>  /  Alb  1.7<L>  /  TBili  1.0  /  DBili  x   /  AST  19  /  ALT  6<L>  /  AlkPhos  267<H>  01-23      HEMATOLOGIC                        7.1    14.79 )-----------( 670      ( 23 Jan 2020 21:42 )             22.7     PT/INR - ( 23 Jan 2020 01:14 )   PT: 16.0 sec;   INR: 1.38 ratio         PTT - ( 23 Jan 2020 07:11 )  PTT:60.6 sec    INFECTIOUS DISEASES  RECENT CULTURES:  Specimen Source: .Body Fluid Abdominal Fluid  Date/Time: 01-22 @ 22:15  Culture Results:   No growth  Gram Stain:   polymorphonuclear leukocytes seen  Gram Negative Rods seen  by cytocentrifuge  Organism: --  Specimen Source: .Blood Blood  Date/Time: 01-21 @ 05:11  Culture Results:   No growth to date.  Gram Stain: --  Organism: --  Specimen Source: .Blood Blood  Date/Time: 01-21 @ 03:02  Culture Results:   No growth to date.  Gram Stain: --  Organism: --

## 2020-01-24 NOTE — PROGRESS NOTE ADULT - ASSESSMENT
49M PMHx of ESRD on HD  ( via LUE AVF), HTN, Gout, Glaucoma, NET of pancreas, RA with hand contractures. s/p DDRT 12/8/19,  Post op course c/b DGF requiring HD. Renal bx x 2 c/w profound ATN.     Re-admitted with Influenza and perinephric hematoma. s/p hematoma evacuation/ abd washout and renal biopsy. Post op course c/b leukocytosis, infected hematoma, acute RLE DVT, and bleeding pseudoaneurysm at txp renal artery anastomosis. Now s/p graft nephrectomy. Course further complicated by saddle PE causing right heart strain and hemodynamic instability leading to cardiac arrest requiring ACLS,     [] DDRT c/b DGF/ATN/perinephric infected hematoma/pseudoneurysm/graft nephrectomy   - HD per nephrology team.  - s/p bedside IR drainage of RLQ fluid collection (1/15), drain repositioned 1/22 and cultures sent  - FU final IR culture, fungal culture from 1/22  - Febrile overnight. FU BC 1/23  - ID following;  cont Meropenem, Micafungin 100mg daily.  Will likely need long term antibiotics.   - pain control  - bowel regimen  - PT/OT/OOB.  VAC change per PT  - Keep B/L LE bandaged with ace wrap      [] Saddle PE, with right heart strain and complicated by PEA arrest followed by [place], ROSC achieved after ACLS  - vascular, CT and vascular cardiology following   - CT 1/21: There are pulmonary emboli within the right lower lobar arteries and the left lower lobar arteries extending into the distal segments as seen on prior study. The pulmonary emboli seen in the right and left pulmonary arteries are normal longer visualized. New distal to mid brachial DVT noted  1/19 Duplex: RUE Deep vein thrombosis in the mid to distal brachial vein.  - Increase Eliquis to 5mg PO BID   - Thrombosytosis in setting of PE/DVTs. Add Aspirin 81mg daily  - repeat Echo reviewed     [] New acute DVT of R distal brachia vein   - continue anticoagulation     [] New acute DVT of R external iliac, common femoral, femoral veins, R soleal vein requiring heparin drip  - IVC filter placed 1/9, continue anticoagulation     [] Dispo  - continue SICU care

## 2020-01-24 NOTE — PROGRESS NOTE ADULT - ATTENDING COMMENTS
appears to be improving.     On therapeutic anticoagulation.     will continue working on OT/PT.    Can transfer to

## 2020-01-24 NOTE — PROGRESS NOTE ADULT - SUBJECTIVE AND OBJECTIVE BOX
Seen in SICU, resting, + fevers    Vital Signs Last 24 Hrs  T(C): 37.9 (01-24-20 @ 11:00), Max: 39.3 (01-23-20 @ 19:25)  T(F): 100.2 (01-24-20 @ 11:00), Max: 102.8 (01-23-20 @ 19:25)  HR: 69 (01-24-20 @ 14:00) (55 - 83)  BP: 133/63 (01-24-20 @ 14:00) (71/41 - 155/72)  BP(mean): 90 (01-24-20 @ 14:00) (55 - 103)  RR: 15 (01-24-20 @ 14:00) (11 - 23)  SpO2: 99% (01-24-20 @ 14:00) (95% - 100%)    Card S1S2  Lungs decr BS b/l  Abd soft  Extr + edema b/l LE R > L, + edema RUE, AVF patent                                                                                                                                                                        7.4    14.70 )-----------( 661      ( 24 Jan 2020 01:39 )             23.3     24 Jan 2020 01:39    134    |  95     |  15     ----------------------------<  132    3.5     |  25     |  4.59     Ca    8.3        24 Jan 2020 01:39  Phos  2.3       24 Jan 2020 01:39  Mg     2.0       24 Jan 2020 01:39    TPro  5.2    /  Alb  1.8    /  TBili  1.1    /  DBili  0.7    /  AST  21     /  ALT  5      /  AlkPhos  290    24 Jan 2020 01:39    LIVER FUNCTIONS - ( 24 Jan 2020 01:39 )  Alb: 1.8 g/dL / Pro: 5.2 g/dL / ALK PHOS: 290 U/L / ALT: 5 U/L / AST: 21 U/L / GGT: x           PT/INR - ( 24 Jan 2020 01:39 )   PT: 18.6 sec;   INR: 1.61 ratio      acetaminophen   Tablet .. 975 milliGRAM(s) Oral every 6 hours PRN  allopurinol 100 milliGRAM(s) Oral daily  aMIOdarone    Tablet 200 milliGRAM(s) Oral two times a day  apixaban 5 milliGRAM(s) Oral two times a day  aspirin  chewable 81 milliGRAM(s) Oral daily  brimonidine 0.2% Ophthalmic Solution 1 Drop(s) Both EYES three times a day  chlorhexidine 2% Cloths 1 Application(s) Topical <User Schedule>  epoetin trey Injectable 41300 Unit(s) IV Push <User Schedule>  gabapentin 100 milliGRAM(s) Oral every 12 hours  latanoprost 0.005% Ophthalmic Solution 1 Drop(s) Both EYES daily  meropenem  IVPB 500 milliGRAM(s) IV Intermittent every 12 hours  micafungin IVPB 100 milliGRAM(s) IV Intermittent every 24 hours  Nephro-candace 1 Tablet(s) Oral daily  oxyCODONE    IR 10 milliGRAM(s) Oral every 6 hours PRN  oxyCODONE    IR 5 milliGRAM(s) Oral every 4 hours PRN    A/P:    Adm w/Flu A 12/27/19  Hx ESRD on HD, s/p DDRT 12/8/19, DGF  S/p transplant kidney bx 12/13: ATN, focal TMA  S/p PLEX 12/13, 12/15; d/c-d 12/20 w/op HD 3 x wk  Re-adm 12/27 w/infected carlos-nephric hematoma   S/p OR 1/1 for washout and repeat renal graft biopsy (c/w ATN)  Dx w/RLE DVT  Developed gross hematuria  S/p OR 1/10 for infected carlos-nephric hematoma, hemorrhage involving pseudo-aneurysm of anastomosis of renal artery to external iliac vein,   s/p transplant nephrectomy, s/p IVCF   S/p PEA arrest 1/13, intubated, s/p CTA, + PE, R retrop collection  S/p IR drainage of abd collection  New RUE DVT  Abx, fever w/up per ID  HD TTS  Fluid removal as tolerates  Epogen w/HD 3 x week

## 2020-01-25 DIAGNOSIS — D47.3 ESSENTIAL (HEMORRHAGIC) THROMBOCYTHEMIA: ICD-10-CM

## 2020-01-25 LAB
ALBUMIN SERPL ELPH-MCNC: 2.1 G/DL — LOW (ref 3.3–5)
ALP SERPL-CCNC: 307 U/L — HIGH (ref 40–120)
ALT FLD-CCNC: 5 U/L — LOW (ref 10–45)
ANION GAP SERPL CALC-SCNC: 12 MMOL/L — SIGNIFICANT CHANGE UP (ref 5–17)
APTT BLD: 42.4 SEC — HIGH (ref 27.5–36.3)
AST SERPL-CCNC: 20 U/L — SIGNIFICANT CHANGE UP (ref 10–40)
BILIRUB DIRECT SERPL-MCNC: 0.8 MG/DL — HIGH (ref 0–0.2)
BILIRUB INDIRECT FLD-MCNC: 0.4 MG/DL — SIGNIFICANT CHANGE UP (ref 0.2–1)
BILIRUB SERPL-MCNC: 1.2 MG/DL — SIGNIFICANT CHANGE UP (ref 0.2–1.2)
BUN SERPL-MCNC: 20 MG/DL — SIGNIFICANT CHANGE UP (ref 7–23)
CALCIUM SERPL-MCNC: 8.9 MG/DL — SIGNIFICANT CHANGE UP (ref 8.4–10.5)
CHLORIDE SERPL-SCNC: 94 MMOL/L — LOW (ref 96–108)
CO2 SERPL-SCNC: 27 MMOL/L — SIGNIFICANT CHANGE UP (ref 22–31)
CREAT SERPL-MCNC: 5.9 MG/DL — HIGH (ref 0.5–1.3)
GLUCOSE SERPL-MCNC: 89 MG/DL — SIGNIFICANT CHANGE UP (ref 70–99)
HCT VFR BLD CALC: 24.1 % — LOW (ref 39–50)
HGB BLD-MCNC: 7.7 G/DL — LOW (ref 13–17)
INR BLD: 1.67 RATIO — HIGH (ref 0.88–1.16)
MAGNESIUM SERPL-MCNC: 2.1 MG/DL — SIGNIFICANT CHANGE UP (ref 1.6–2.6)
MCHC RBC-ENTMCNC: 29.7 PG — SIGNIFICANT CHANGE UP (ref 27–34)
MCHC RBC-ENTMCNC: 32 GM/DL — SIGNIFICANT CHANGE UP (ref 32–36)
MCV RBC AUTO: 93.1 FL — SIGNIFICANT CHANGE UP (ref 80–100)
NRBC # BLD: 0 /100 WBCS — SIGNIFICANT CHANGE UP (ref 0–0)
PHOSPHATE SERPL-MCNC: 3.1 MG/DL — SIGNIFICANT CHANGE UP (ref 2.5–4.5)
PLATELET # BLD AUTO: 795 K/UL — HIGH (ref 150–400)
POTASSIUM SERPL-MCNC: 3.8 MMOL/L — SIGNIFICANT CHANGE UP (ref 3.5–5.3)
POTASSIUM SERPL-SCNC: 3.8 MMOL/L — SIGNIFICANT CHANGE UP (ref 3.5–5.3)
PROCALCITONIN SERPL-MCNC: 2.09 NG/ML — HIGH (ref 0.02–0.1)
PROT SERPL-MCNC: 5.8 G/DL — LOW (ref 6–8.3)
PROTHROM AB SERPL-ACNC: 19.5 SEC — HIGH (ref 10–12.9)
RBC # BLD: 2.59 M/UL — LOW (ref 4.2–5.8)
RBC # FLD: 16.7 % — HIGH (ref 10.3–14.5)
SODIUM SERPL-SCNC: 133 MMOL/L — LOW (ref 135–145)
WBC # BLD: 15.03 K/UL — HIGH (ref 3.8–10.5)
WBC # FLD AUTO: 15.03 K/UL — HIGH (ref 3.8–10.5)

## 2020-01-25 PROCEDURE — 99232 SBSQ HOSP IP/OBS MODERATE 35: CPT | Mod: GC

## 2020-01-25 PROCEDURE — 71045 X-RAY EXAM CHEST 1 VIEW: CPT | Mod: 26

## 2020-01-25 PROCEDURE — 99232 SBSQ HOSP IP/OBS MODERATE 35: CPT

## 2020-01-25 RX ADMIN — OXYCODONE HYDROCHLORIDE 10 MILLIGRAM(S): 5 TABLET ORAL at 09:27

## 2020-01-25 RX ADMIN — APIXABAN 5 MILLIGRAM(S): 2.5 TABLET, FILM COATED ORAL at 05:39

## 2020-01-25 RX ADMIN — APIXABAN 5 MILLIGRAM(S): 2.5 TABLET, FILM COATED ORAL at 17:52

## 2020-01-25 RX ADMIN — MEROPENEM 100 MILLIGRAM(S): 1 INJECTION INTRAVENOUS at 19:20

## 2020-01-25 RX ADMIN — BRIMONIDINE TARTRATE 1 DROP(S): 2 SOLUTION/ DROPS OPHTHALMIC at 14:10

## 2020-01-25 RX ADMIN — Medication 81 MILLIGRAM(S): at 12:06

## 2020-01-25 RX ADMIN — GABAPENTIN 100 MILLIGRAM(S): 400 CAPSULE ORAL at 05:36

## 2020-01-25 RX ADMIN — OXYCODONE HYDROCHLORIDE 10 MILLIGRAM(S): 5 TABLET ORAL at 17:51

## 2020-01-25 RX ADMIN — GABAPENTIN 100 MILLIGRAM(S): 400 CAPSULE ORAL at 17:52

## 2020-01-25 RX ADMIN — MEROPENEM 100 MILLIGRAM(S): 1 INJECTION INTRAVENOUS at 05:40

## 2020-01-25 RX ADMIN — BRIMONIDINE TARTRATE 1 DROP(S): 2 SOLUTION/ DROPS OPHTHALMIC at 05:40

## 2020-01-25 RX ADMIN — ERYTHROPOIETIN 10000 UNIT(S): 10000 INJECTION, SOLUTION INTRAVENOUS; SUBCUTANEOUS at 14:11

## 2020-01-25 RX ADMIN — Medication 100 MILLIGRAM(S): at 14:14

## 2020-01-25 RX ADMIN — MICAFUNGIN SODIUM 105 MILLIGRAM(S): 100 INJECTION, POWDER, LYOPHILIZED, FOR SOLUTION INTRAVENOUS at 17:19

## 2020-01-25 RX ADMIN — BRIMONIDINE TARTRATE 1 DROP(S): 2 SOLUTION/ DROPS OPHTHALMIC at 22:07

## 2020-01-25 RX ADMIN — OXYCODONE HYDROCHLORIDE 10 MILLIGRAM(S): 5 TABLET ORAL at 09:37

## 2020-01-25 RX ADMIN — OXYCODONE HYDROCHLORIDE 10 MILLIGRAM(S): 5 TABLET ORAL at 18:51

## 2020-01-25 RX ADMIN — Medication 1 TABLET(S): at 12:06

## 2020-01-25 RX ADMIN — LATANOPROST 1 DROP(S): 0.05 SOLUTION/ DROPS OPHTHALMIC; TOPICAL at 12:11

## 2020-01-25 RX ADMIN — AMIODARONE HYDROCHLORIDE 200 MILLIGRAM(S): 400 TABLET ORAL at 05:37

## 2020-01-25 RX ADMIN — AMIODARONE HYDROCHLORIDE 200 MILLIGRAM(S): 400 TABLET ORAL at 17:51

## 2020-01-25 NOTE — PROGRESS NOTE ADULT - PROBLEM SELECTOR PLAN 2
Started on Eliquis 2.5 mg BID. Start ASA 81 mg today in the setting of thrombocytosis and recent recent blood clots. On Eliquis 5 mg BID. and ASA 81 mg today in the setting of thrombocytosis and recent recent blood clots. No signs of active bleeding , Hb has been stable.

## 2020-01-25 NOTE — PROGRESS NOTE ADULT - PROBLEM SELECTOR PLAN 1
Volume overload improving. Electrolyte balance is acceptable (mild hyponatremia likely 2/2 volume overload). Last HD treatment on 1/23/20, tolerating 2.2 L of UF. Monitor labs Last HD treatment on 1/23/20. scheduled for IHD today. Follows with nephrologist, Dr Amaya. He may benefit from daily IHD/IUF sessions for fluid removal for anasarca. will discuss.

## 2020-01-25 NOTE — PROGRESS NOTE ADULT - SUBJECTIVE AND OBJECTIVE BOX
· Subjective and Objective:   [facility] - Melissa Kelsey, Maximino Nunez, Hamzah Funes [place], [place]  Office Number:  [phone]    Patient resting comfortably in bed in NAD.  Laying flat with no respiratory distress.  No complaints of chest pain, dyspnea, palpitations, PND, or orthopnea.  He is sitting in a chair this AM    F/U for:  VT arrest    Telemetry:  SR and SB        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  Carpal tunnel syndrome  Brain cyst: had MRI recently- now gone  Anemia  Gastric ulcer: Long time ago  Rheumatoid arthritis  Renal transplant recipient  Breakdown (mechanical) of penile (implanted) prosthesis, sequela  End-stage renal disease (ESRD): PERMACATH PLACEMENT  Abscess of left buttock: s/p I &amp; D  AV fistula: placement left lower arm  S/P cystoscopy: stent placement &amp; removal for kidney stones, lithiotripsy  s/p Lt Carpal tunnel:       MEDICATIONS  (STANDING):  allopurinol 100 milliGRAM(s) Oral daily  aMIOdarone    Tablet 200 milliGRAM(s) Oral two times a day  apixaban 5 milliGRAM(s) Oral two times a day  aspirin  chewable 81 milliGRAM(s) Oral daily  brimonidine 0.2% Ophthalmic Solution 1 Drop(s) Both EYES three times a day  chlorhexidine 2% Cloths 1 Application(s) Topical <User Schedule>  epoetin trey Injectable 18057 Unit(s) IV Push <User Schedule>  gabapentin 100 milliGRAM(s) Oral every 12 hours  latanoprost 0.005% Ophthalmic Solution 1 Drop(s) Both EYES daily  meropenem  IVPB 500 milliGRAM(s) IV Intermittent every 12 hours  micafungin IVPB 100 milliGRAM(s) IV Intermittent every 24 hours  Nephro-candace 1 Tablet(s) Oral daily      Allergies    coconut (Anaphylaxis)  morphine (Pruritus; Rash)    Intolerances                              7.7    15.03 )-----------( 795      ( 2020 06:13 )             24.1       01-25    133<L>  |  94<L>  |  20  ----------------------------<  89  3.8   |  27  |  5.90<H>    Ca    8.9      :  Phos  3.1       Mg     2.1         TPro  5.8<L>  /  Alb  2.1<L>  /  TBili  1.2  /  DBili  0.8<H>  /  AST  20  /  ALT  5<L>  /  AlkPhos  307<H>        LIVER FUNCTIONS - ( :13 )  Alb: 2.1 g/dL / Pro: 5.8 g/dL / ALK PHOS: 307 U/L / ALT: 5 U/L / AST: 20 U/L / GGT: x             PT/INR - ( :13 )   PT: 19.5 sec;   INR: 1.67 ratio         PTT - ( : )  PTT:42.4 sec                      Daily     Daily Weight in k.9 (2020 05:00)    I&O's Summary    2020 07:01  -  2020 07:00  --------------------------------------------------------  IN: 300 mL / OUT: 75 mL / NET: 225 mL        Vital Signs Last 24 Hrs  T(C): 37 (2020 07:00), Max: 38.2 (2020 16:00)  T(F): 98.6 (2020 07:00), Max: 100.8 (2020 16:00)  HR: 61 (2020 07:00) (57 - 81)  BP: 102/56 (2020 07:00) (86/54 - 155/72)  BP(mean): 73 (2020 07:00) (66 - 103)  RR: 15 (2020 07:00) (5 - 23)  SpO2: 99% (2020 07:00) (97% - 100%)    PHYSICAL EXAM:   · Constitutional	Well-developed, well nourished  · Eyes	EOMI; PERRL; no drainage or redness  · ENMT	No oral lesions; no gross abnormalities  · Neck	No bruits; no thyromegaly or nodules  · Respiratory	Normal breath sounds b/l, No RRW  · Cardiovascular	Regular rate & rhythm, normal S1, S2; no murmurs, gallops or rubs; no S3, S4  · Gastrointestinal	Soft, non-tender, no hepatosplenomegaly, normal bowel sounds  · Extremities	No cyanosis, clubbing or edema  · Vascular	Equal and normal pulses (carotid, femoral, dorsalis pedis)  · Neurological	Alert & oriented; no sensory, motor or coordination deficits, normal reflexes

## 2020-01-25 NOTE — PROGRESS NOTE ADULT - SUBJECTIVE AND OBJECTIVE BOX
infectious diseases progress note:    Patient is a 49y old  Male who presents with a chief complaint of Fever (25 Jan 2020 03:08)        Fever due to unspecified condition        ROS:  CONSTITUTIONAL:  Negative fever or chills, feels well, good appetite  EYES:  Negative  blurry vision or double vision  CARDIOVASCULAR:  Negative for chest pain or palpitations  RESPIRATORY:  Negative for cough, wheezing, or SOB   GASTROINTESTINAL:  Negative for nausea, vomiting, diarrhea, constipation, or abdominal pain  GENITOURINARY:  Negative frequency, urgency or dysuria  NEUROLOGIC:  No headache, confusion, dizziness, lightheadedness    Allergies    coconut (Anaphylaxis)  morphine (Pruritus; Rash)    Intolerances        ANTIBIOTICS/RELEVANT:  antimicrobials  meropenem  IVPB 500 milliGRAM(s) IV Intermittent every 12 hours  micafungin IVPB 100 milliGRAM(s) IV Intermittent every 24 hours    immunologic:  epoetin trey Injectable 26091 Unit(s) IV Push <User Schedule>    OTHER:  acetaminophen   Tablet .. 975 milliGRAM(s) Oral every 6 hours PRN  allopurinol 100 milliGRAM(s) Oral daily  aMIOdarone    Tablet 200 milliGRAM(s) Oral two times a day  apixaban 5 milliGRAM(s) Oral two times a day  aspirin  chewable 81 milliGRAM(s) Oral daily  brimonidine 0.2% Ophthalmic Solution 1 Drop(s) Both EYES three times a day  chlorhexidine 2% Cloths 1 Application(s) Topical <User Schedule>  gabapentin 100 milliGRAM(s) Oral every 12 hours  latanoprost 0.005% Ophthalmic Solution 1 Drop(s) Both EYES daily  Nephro-candace 1 Tablet(s) Oral daily  oxyCODONE    IR 10 milliGRAM(s) Oral every 6 hours PRN  oxyCODONE    IR 5 milliGRAM(s) Oral every 4 hours PRN      Objective:  Vital Signs Last 24 Hrs  T(C): 37 (25 Jan 2020 07:00), Max: 38.2 (24 Jan 2020 16:00)  T(F): 98.6 (25 Jan 2020 07:00), Max: 100.8 (24 Jan 2020 16:00)  HR: 61 (25 Jan 2020 07:00) (57 - 81)  BP: 102/56 (25 Jan 2020 07:00) (86/54 - 155/72)  BP(mean): 73 (25 Jan 2020 07:00) (66 - 103)     Eyes:JEAN, EOMI  Ear/Nose/Throat: no oral lesion, no sinus tenderness on percussion	  Neck:no JVD, no lymphadenopathy, supple  Respiratory: CTA jolynn  Cardiovascular: S1S2 RRR, no murmurs  Gastrointestinal:soft, (+) BS, no HSM  Extremities:no e/e/c        LABS:                        7.7    15.03 )-----------( 795      ( 25 Jan 2020 06:13 )             24.1     01-25    133<L>  |  94<L>  |  20  ----------------------------<  89  3.8   |  27  |  5.90<H>    Ca    8.9      25 Jan 2020 06:13  Phos  3.1     01-25  Mg     2.1     01-25    TPro  5.8<L>  /  Alb  2.1<L>  /  TBili  1.2  /  DBili  0.8<H>  /  AST  20  /  ALT  5<L>  /  AlkPhos  307<H>  01-25    PT/INR - ( 25 Jan 2020 06:13 )   PT: 19.5 sec;   INR: 1.67 ratio         PTT - ( 25 Jan 2020 06:13 )  PTT:42.4 sec        MICROBIOLOGY:    RECENT CULTURES:  01-24 @ 05:14 .Blood Blood                No growth to date.    01-24 @ 02:22 .Body Fluid RLQ Fluid Collection                Testing in progress    01-24 @ 01:59 .Blood Blood                No growth to date.    01-22 @ 22:15 .Body Fluid Abdominal Fluid       polymorphonuclear leukocytes seen  Gram Negative Rods seen  by cytocentrifuge           No growth    01-21 @ 05:11 .Blood Blood                No growth to date.    01-21 @ 03:02 .Blood Blood                No growth to date.    01-18 @ 15:26 .Sputum Sputum, trap       No polymorphonuclear leukocytes per low power field  Rare Squamous epithelial cells per low power field  Rare Gram positive cocci in pairs per oil power field           Normal Respiratory Gillian present          RESPIRATORY CULTURES:              RADIOLOGY & ADDITIONAL STUDIES:        Pager [phone]  After 5 pm/weekends or if no response :[phone]

## 2020-01-25 NOTE — PROGRESS NOTE ADULT - SUBJECTIVE AND OBJECTIVE BOX
Seen in SICU on HD    Vital Signs Last 24 Hrs  T(C): 37 (01-25-20 @ 07:00), Max: 38.2 (01-24-20 @ 16:00)  T(F): 98.6 (01-25-20 @ 07:00), Max: 100.8 (01-24-20 @ 16:00)  HR: 72 (01-25-20 @ 13:00) (57 - 80)  BP: 119/61 (01-25-20 @ 13:00) (86/54 - 138/67)  BP(mean): 84 (01-25-20 @ 13:00) (66 - 92)  RR: 17 (01-25-20 @ 13:00) (5 - 22)  SpO2: 100% (01-25-20 @ 13:00) (97% - 100%)    Card S1S2  Lungs b/l air entry  Abd soft  Extr ++ edema b/l LE R > L, + edema RUE, AVF patent                                                                                                                                                                                 7.7    15.03 )-----------( 795      ( 25 Jan 2020 06:13 )             24.1     25 Jan 2020 06:13    133    |  94     |  20     ----------------------------<  89     3.8     |  27     |  5.90     Ca    8.9        25 Jan 2020 06:13  Phos  3.1       25 Jan 2020 06:13  Mg     2.1       25 Jan 2020 06:13    TPro  5.8    /  Alb  2.1    /  TBili  1.2    /  DBili  0.8    /  AST  20     /  ALT  5      /  AlkPhos  307    25 Jan 2020 06:13    LIVER FUNCTIONS - ( 25 Jan 2020 06:13 )  Alb: 2.1 g/dL / Pro: 5.8 g/dL / ALK PHOS: 307 U/L / ALT: 5 U/L / AST: 20 U/L / GGT: x           PT/INR - ( 25 Jan 2020 06:13 )   PT: 19.5 sec;   INR: 1.67 ratio      acetaminophen   Tablet .. 975 milliGRAM(s) Oral every 6 hours PRN  allopurinol 100 milliGRAM(s) Oral daily  aMIOdarone    Tablet 200 milliGRAM(s) Oral two times a day  apixaban 5 milliGRAM(s) Oral two times a day  aspirin  chewable 81 milliGRAM(s) Oral daily  brimonidine 0.2% Ophthalmic Solution 1 Drop(s) Both EYES three times a day  chlorhexidine 2% Cloths 1 Application(s) Topical <User Schedule>  epoetin trey Injectable 78293 Unit(s) IV Push <User Schedule>  gabapentin 100 milliGRAM(s) Oral every 12 hours  latanoprost 0.005% Ophthalmic Solution 1 Drop(s) Both EYES daily  meropenem  IVPB 500 milliGRAM(s) IV Intermittent every 12 hours  micafungin IVPB 100 milliGRAM(s) IV Intermittent every 24 hours  Nephro-candace 1 Tablet(s) Oral daily  oxyCODONE    IR 10 milliGRAM(s) Oral every 6 hours PRN  oxyCODONE    IR 5 milliGRAM(s) Oral every 4 hours PRN    A/P:    Adm w/Flu A 12/27/19  Hx ESRD on HD, s/p DDRT 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/19, DGF  S/p transplant kidney bx 12/13: ATN, focal TMA  S/p PLEX 12/13, 12/15; d/c-d 12/20 w/op HD 3 x wk  Re-adm 12/27 w/infected carlos-nephric hematoma   S/p OR 1/1 for washout and repeat renal graft biopsy (c/w ATN)  Dx w/RLE DVT  Developed gross hematuria  S/p OR 1/10 for infected carlos-nephric hematoma, hemorrhage involving pseudo-aneurysm of anastomosis of renal artery to external iliac vein,   s/p transplant nephrectomy, s/p IVCF   S/p PEA arrest 1/13, intubated, s/p CTA, + PE, R retrop collection  S/p IR drainage of abd collection  New RUE DVT  Abx, fever w/up per ID  HD TTS  Fluid removal as tolerates  Epogen w/HD 3 x week

## 2020-01-25 NOTE — PROGRESS NOTE ADULT - ATTENDING COMMENTS
seen and assessed on 1/25    appears well.  PLT count up. ASA started.  on therapeutic anticoagulation  ID involved for abx.  Procal decreasing

## 2020-01-25 NOTE — PROGRESS NOTE ADULT - PROBLEM SELECTOR PLAN 3
ECHO 1/15/20 - improving right heart strain.   Hemodynamically stable. Started on Eliquis 2.5 mg BID. Start ASA 81 mg today. ECHO 1/15/20 - improving right heart strain.   Hemodynamically stable. On Eliquis 2.5 mg BID and ASA 81 mg today.

## 2020-01-25 NOTE — PROGRESS NOTE ADULT - ASSESSMENT
49M PMHx of ESRD on HD  ( via LUE AVF), HTN, Gout, Glaucoma, NET of pancreas, RA with hand contractures. s/p DDRT 12/8/19,  Post op course c/b DGF requiring HD. Renal bx x 2 c/w profound ATN.     Re-admitted with Influenza and perinephric hematoma. s/p hematoma evacuation/ abd washout and renal biopsy. Post op course c/b leukocytosis, infected hematoma, acute RLE DVT, and bleeding pseudoaneurysm at txp renal artery anastomosis. Now s/p graft nephrectomy. Course further complicated by saddle PE causing right heart strain and hemodynamic instability leading to cardiac arrest requiring ACLS,     [] DDRT c/b DGF/ATN/perinephric infected hematoma/pseudoneurysm/graft nephrectomy   - HD per nephrology team.  - s/p bedside IR drainage of RLQ fluid collection (1/15), drain repositioned 1/22 and cultures sent  - FU final IR culture, fungal culture from 1/22  - Febrile overnight,  ID following;  cont Meropenem, Micafungin 100mg daily.  Will likely need long term antibiotics.   - pain control  - bowel regimen  - PT/OT/OOB.  VAC change per PT  - Keep B/L LE bandaged with ace wrap      [] Saddle PE, with right heart strain and complicated by PEA arrest followed by [place], ROSC achieved after ACLS  - vascular, CT and vascular cardiology following   - CT 1/21: There are pulmonary emboli within the right lower lobar arteries and the left lower lobar arteries extending into the distal segments as seen on prior study. The pulmonary emboli seen in the right and left pulmonary arteries are normal longer visualized. New distal to mid brachial DVT noted  1/19 Duplex: RUE Deep vein thrombosis in the mid to distal brachial vein.  - Continue Eliquis to 5mg PO BID   - continue ASA  - repeat Echo reviewed     [] New acute DVT of R distal brachia vein   - continue anticoagulation     [] New acute DVT of R external iliac, common femoral, femoral veins, R soleal vein requiring heparin drip  - IVC filter placed 1/9, continue anticoagulation     [] Dispo  - continue SICU care, ok to floor if stable

## 2020-01-25 NOTE — PROGRESS NOTE ADULT - ATTENDING COMMENTS
Dr. Philippe (Attending Physician)  Pain improved  Bilateral PEs improved  Cardiac arrest with polymorphic VT on amiodarone  Renal diet  ESRD to receive HD today  Anemia back on epogen  Persistent Fevers febrile last night, CXR clear

## 2020-01-25 NOTE — PROGRESS NOTE ADULT - SUBJECTIVE AND OBJECTIVE BOX
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 Patient was admitted and hospital course is as follows:    12/27 - started on Tamiflu, Duplex of the transplanted kidney demonstrated an increase in the perinephric hematoma from 5.7 cm to 9.4 cm  12/29 - patient reporting dysuria so urine culture sent, which was positive for Enterobacter cloacae  01/01 - s/p right groin exploration, washout of the perinephric hematoma, and biopsy of the transplanted kidney, cultures of the hematoma positive for Enterobacter cloacae & E. coli, biopsy demonstrates  01/05 - patient noted to be febrile and hypotensive with new episodes of wide complex tachycardia on telemetry, labs significant for worsening leukocytosis, cultures sent, CT scan obtained & demonstrated a persistent perinephric collection, admitted to SICU for hemodynamic monitoring  01/06 - FNA w/ IR with aspiration of 18 mL of clear & pale yellow fluid but cultures negative for any growth, patient began having gross hematuria requiring CBI  01/07 - repeat imaging demonstrated no change in the size of the perinephric collection  01/08 - RLE edema was noted so a LE Duplex was obtained, which demonstrated a right external iliac & common femoral vein DVT, vascular surgery consulted, patient started on a heparin infusion  01/10 - patient became altered with worsening hematuria despite CBI, patient was taken to IR for an angiogram, which demonstrated a pseudoaneurysm of the transplant renal artery & external iliac artery anastomosis so the pseudoaneurysm was stented & an IVC filter was placed, patient taken to the OR and is s/p right groin exploration, washout of the RP space, transplant nephrectomy w/ repair of the right external iliac artery w/ a bovine patch & removal of the stent placed by IR, and thrombectomy of the RLE veins, returned to SICU still intubated  01/13 - acutely bradycardic and hypotensive progressing into PEA arrest with progression into torsades requiring ACLS for a total of ~12 mins, imaging revealed saddle PE with TTE demonstrating RV strain, PERT team consulted, management with heparin infusion as patient is not a tPA candidate, did not require vasopressors shortly after ROSC  01/15 - ultrasound with large right iliac fossa collection s/p percutaneous drainage by IR, 220 mL of dark red fluid drained and 10 Fr drain was left in placed  01/16 - extubated  01/19 - RUE Duplex with thrombosis of the brachial & cephalic veins, RLQ ultrasound with interval decrease in right iliac fossa collection  01/21 - CTA chest and CT abdomen & pelvis obtained demonstrating known RLL and LLL lobar artery PEs but resolution of PE in bilateral pulmonary arteries, new BLL wedge-like opacities likely representing pulmonary infarcts, and interval decrease in RLQ fluid collection  01/22 - RLQ IR drain upsized by IR from 10 Fr to 12 Fr  01/23 - Changed heparin infusion to Eliquis    Recipient info:   CPRA:    0%  ABO:      A+  CMVAb:  Positive  EBVIgG Status:  Positive  Last HD:  12/7/2019    Donor ID:  [id number]  Match: [id number]  OPO: NYRT  Age:  49  ABO:  A  KDPI 81%  COD:  Anoxia  X Clamp Time:  12/7/2019 1538.  Medical Hx: DM, HTN, HLD, obesity BMI 53, CAD, CABAGX3  Terminal Cr:  1/1.8/1.7  CMV- Neg EBV- Neg    OR:    DDRT to right iliac fossa. Simulect induction. Two arteries anastomosed to a single cuff on the back table. One vein, one ureter. Ureteral anastomosis performed over a double J stent, which was sutured to the martinez.   Cold ischemia time 25.5 hours. (27 Dec 2019 10:45)      NEURO  Exam: awake, alert, oriented  Meds: acetaminophen   Tablet .. 975 milliGRAM(s) Oral every 6 hours PRN Mild Pain (1 - 3)  gabapentin 100 milliGRAM(s) Oral every 12 hours  HYDROmorphone  Injectable 0.5 milliGRAM(s) IV Push every 3 hours PRN Severe Pain (7 - 10)  oxyCODONE    IR 5 milliGRAM(s) Oral every 4 hours PRN Moderate Pain (4 - 6)      RESPIRATORY  RR: 6 (01-24-20 @ 23:00) (6 - 23)  SpO2: 100% (01-24-20 @ 23:00) (97% - 100%)  Wt(kg): --  Mechanical Ventilation:     CARDIOVASCULAR  HR: 60 (01-24-20 @ 23:00) (55 - 81)  BP: 88/53 (01-24-20 @ 23:00) (86/54 - 155/72)  BP(mean): 66 (01-24-20 @ 23:00) (65 - 103)  ABP: --  ABP(mean): --  Wt(kg): --  CVP(cm H2O): --  VBG - ( 24 Jan 2020 03:20 )  pH: 7.41  /  pCO2: 50    /  pO2: 37    / HCO3: 31    / Base Excess: 6.4   /  SaO2: 74     Lactate: 1.2    Meds: aMIOdarone    Tablet 200 milliGRAM(s) Oral two times a day    GI/NUTRITION  Exam: Soft, non-tender, non-distended, incision C/D/I  Diet: Regular    GENITOURINARY  I&O's Detail  01-23 @ 07:01  -  01-24 @ 07:00  --------------------------------------------------------  IN:    heparin Infusion: 42 mL    Instillation: 5 mL    Oral Fluid: 960 mL    Solution: 200 mL  Total IN: 1207 mL    OUT:    Bulb: 240 mL    Other: 2200 mL  Total OUT: 2440 mL  Total NET: -1233 mL      01-24 @ 07:01  -  01-25 @ 03:09  --------------------------------------------------------  IN:    Solution: 250 mL  Total IN: 250 mL    OUT:    Bulb: 70 mL  Total OUT: 70 mL  Total NET: 180 mL    01-24    134<L>  |  95<L>  |  15  ----------------------------<  132<H>  3.5   |  25  |  4.59<H>    Ca    8.3<L>      24 Jan 2020 01:39  Phos  2.3     01-24  Mg     2.0     01-24  TPro  5.2<L>  /  Alb  1.8<L>  /  TBili  1.1  /  DBili  0.7<H>  /  AST  21  /  ALT  5<L>  /  AlkPhos  290<H>  01-24      HEMATOLOGIC                        7.4    14.70 )-----------( 661      ( 24 Jan 2020 01:39 )             23.3     PT/INR - ( 24 Jan 2020 01:39 )   PT: 18.6 sec;   INR: 1.61 ratio    PTT - ( 24 Jan 2020 01:39 )  PTT:38.5 sec    INFECTIOUS DISEASES  RECENT CULTURES:  Specimen Source: .Body Fluid RLQ Fluid Collection  Date/Time: 01-24 @ 02:22  Culture Results:   Testing in progress  Gram Stain: --  Organism: --  Specimen Source: .Blood Blood  Date/Time: 01-24 @ 01:59  Culture Results:   No growth to date.  Gram Stain: --  Organism: --  Specimen Source: .Body Fluid Abdominal Fluid  Date/Time: 01-22 @ 22:15  Culture Results:   No growth  Gram Stain:   polymorphonuclear leukocytes seen  Gram Negative Rods seen  by cytocentrifuge  Organism: --  Specimen Source: .Blood Blood  Date/Time: 01-21 @ 05:11  Culture Results:   No growth to date.  Gram Stain: --  Organism: --  Specimen Source: .Blood Blood  Date/Time: 01-21 @ 03:02  Culture Results:   No growth to date.  Gram Stain: --  Organism: --      ENDOCRINE  Blood Gas Venous - Glucose (01.24.20 @ 03:20)  Blood Gas Venous - Glucose: 106 mg/dL

## 2020-01-25 NOTE — PROGRESS NOTE ADULT - SUBJECTIVE AND OBJECTIVE BOX
Transplant Surgery - Multidisciplinary Rounds  --------------------------------------------------------------  R DDRT    Date:  12/8/19     Present:   Patient seen with multidisciplinary team including Transplant Surgeon: Dr. Be, Transplant Nephrologist: Dr. BRITT Puckett, Nephrology fellow Abel Flores, NP: Aislinn Hunt and MAEVE tom and surgical resident Mehdi Bess during am rounds and examined with Dr. Be. Disciplines not in attendance will be notified of the plan.     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 Young, followed by Dr. Raphael, [facility] Oncologist), RA with hand contractures. He underwent DDRT on 12/8/19. Post op course c/b DGF requiring HD, thrombocytopenia, elevated LDH and haptoglobin. Schistocytes  on peripheral smear concerning for TMA. Envarsus held. Received Belatacept 12/13. Started on PLEX (12/12, 12/15). Underwent renal bx on 12/13 c/w profound ATN.  Discharged home on 12/20/19 w/ outpatient HD.     Re-admitted 12/27 with influenza, completed treatement with Tamiflu on 12/31. Urine cx on admission grew Ent cloacae, was started on meropenem 12/31. Renal US on admission with increasing hematoma.   ·	OR 1/1 for ex-lap, hematoma evacuation, washout and renal bx. (c/w ATN)  OR cxs grew CRE and e coli. Post op course c/b:   ·	fevers and leukocytosis, CT a/p (1/4) with increased perinephric collection. Underwent IR guided collection 1/6   ·	New onset hematuria, martinez inserted 1/7, started CBI  ·	RLE edema, RLE doppler (1/8) with acute above the knee thrombus of R external iliac, common femoral and femoral veins, Acute calf vein thrombus affecting R soleal vein, Heparin gtt initiated   ·	AMS 1/9  (head CT neg), hyponatremic  ·	Significant hematuria and bleeding around the martinez. Angio 1/9 showed actively bleeding pseudoaneurysm at the anastomosis of the transplant renal artery with the recipient iliac artery. A stent was placed in the iliac artery to  control hemorrhage. IVC filter placed.   ·	Emergent OR 1/9-1/10 for graft nephrectomy;  ureter to bladder anastomosis was completely disrupted, repaired the R external iliac artery with bovine pericardial patch, repaired right external iliac vein, performed thrombectomy of RLE veins, Intra op received 3u PRBC.   ·	Extubated 1/11  ·	1/13 Saddle PE, with right heart strain and complicated by PEA arrest followed by Torsade, ROSC achieved after ACLS; re-intubated  ·	1/15 Renal US with 13.7 x 5.0 x 1.5 cm in RLQ transplant bed; s/p bedside IR guided drainage, pigtail in place. Fluid cx to date with NG  ·	1/21 CT C/A/P showing persistent collection, catheter at superior portion of collection  ·	1/22 IR pigtail drain repositioned and upsized to 12Fr catheter w/ increased output  ·	1/23 Transitioned to Eliquis from Heparin drip    Interval events:    - TMax 37.9.  Continued on Meropenem and Micafugin  - continued LE edema and RUE edema, slow improvement  - on Eliquis with no signs of bleeding. BO ss. \  - anuric, no issues after martinez removal  - no new complaints at this time    cxs:   12/29: Urine Cx: Enterobacter Cloacae  1/1 OR Perinephric collection Cx:  Carbapenem resistant Ent Cloacae, E. Coli  1/1: R kidney abscess swab: Enterobacter Cloacae  1/1  OR tissue: Enterobacter Cloacae  1/5: Urine Cx: Enteropbacter Cloacae  1/5: Skin incision Cx (bedside): NG  1/6: Adominal fluid Cx from IR:  NG   1/7: Urine Cx:  NG  1/7: BCX2:  NG  1/10 retroperit hematoma - enerobacter   1/10 bladder hematoma - enterobacter  1/15 Drainage fluid - NGTD  1/16: BC X2: NGTD  1/18: SPutum: Normal harvey  1/21: BC X2: NTGD  1/22: IR body fluid Cx: ngtd    Potential Discharge date: pending clinical improvement        MEDICATIONS  (STANDING):  allopurinol 100 milliGRAM(s) Oral daily  aMIOdarone    Tablet 200 milliGRAM(s) Oral two times a day  apixaban 5 milliGRAM(s) Oral two times a day  aspirin  chewable 81 milliGRAM(s) Oral daily  brimonidine 0.2% Ophthalmic Solution 1 Drop(s) Both EYES three times a day  chlorhexidine 2% Cloths 1 Application(s) Topical <User Schedule>  epoetin trey Injectable 92540 Unit(s) IV Push <User Schedule>  gabapentin 100 milliGRAM(s) Oral every 12 hours  latanoprost 0.005% Ophthalmic Solution 1 Drop(s) Both EYES daily  meropenem  IVPB 500 milliGRAM(s) IV Intermittent every 12 hours  micafungin IVPB 100 milliGRAM(s) IV Intermittent every 24 hours  Nephro-candace 1 Tablet(s) Oral daily    MEDICATIONS  (PRN):  acetaminophen   Tablet .. 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  Carpal tunnel syndrome  Brain cyst: had MRI recently- now gone  Anemia  Gastric ulcer: Long time ago  Rheumatoid arthritis  Renal transplant recipient  Breakdown (mechanical) of penile (implanted) prosthesis, sequela  End-stage renal disease (ESRD): PERMACATH PLACEMENT  Abscess of left buttock: s/p I &amp; D  AV fistula: placement left lower arm  S/P cystoscopy: stent placement &amp; removal for kidney stones, lithiotripsy  s/p Lt Carpal tunnel: 2011      Vital Signs Last 24 Hrs  T(C): 37 (25 Jan 2020 07:00), Max: 38.2 (24 Jan 2020 16:00)  T(F): 98.6 (25 Jan 2020 07:00), Max: 100.8 (24 Jan 2020 16:00)  HR: 69 (25 Jan 2020 09:00) (57 - 81)  BP: 112/67 (25 Jan 2020 09:00) (86/54 - 155/72)  BP(mean): 84 (25 Jan 2020 09:00) (66 - 103)  RR: 22 (25 Jan 2020 09:00) (5 - 23)  SpO2: 100% (25 Jan 2020 09:00) (97% - 100%)    I&O's Summary    24 Jan 2020 07:01  -  25 Jan 2020 07:00  --------------------------------------------------------  IN: 300 mL / OUT: 75 mL / NET: 225 mL                              7.7    15.03 )-----------( 795      ( 25 Jan 2020 06:13 )             24.1     01-25    133<L>  |  94<L>  |  20  ----------------------------<  89  3.8   |  27  |  5.90<H>    Ca    8.9      25 Jan 2020 06:13  Phos  3.1     01-25  Mg     2.1     01-25    TPro  5.8<L>  /  Alb  2.1<L>  /  TBili  1.2  /  DBili  0.8<H>  /  AST  20  /  ALT  5<L>  /  AlkPhos  307<H>  01-25          Culture - Blood (collected 01-24-20 @ 05:14)  Source: .Blood Blood  Preliminary Report (01-25-20 @ 06:01):    No growth to date.    Culture - Fungal, Body Fluid (collected 01-24-20 @ 02:22)  Source: .Body Fluid RLQ Fluid Collection  Preliminary Report (01-24-20 @ 15:47):    Testing in progress    Culture - Blood (collected 01-24-20 @ 01:59)  Source: .Blood Blood  Preliminary Report (01-25-20 @ 02:03):    No growth to date.    Culture - Body Fluid with Gram Stain (collected 01-22-20 @ 22:15)  Source: .Body Fluid Abdominal Fluid  Gram Stain (01-23-20 @ 03:51):    polymorphonuclear leukocytes seen    Gram Negative Rods seen    by cytocentrifuge  Preliminary Report (01-23-20 @ 21:46):    No growth    Culture - Blood (collected 01-21-20 @ 05:11)  Source: .Blood Blood  Preliminary Report (01-22-20 @ 06:01):    No growth to date.    Culture - Blood (collected 01-21-20 @ 03:02)  Source: .Blood Blood  Preliminary Report (01-22-20 @ 04:01):    No growth to date.    Culture - Sputum (collected 01-18-20 @ 15:26)  Source: .Sputum Sputum, trap  Gram Stain (01-18-20 @ 20:56):    No polymorphonuclear leukocytes per low power field    Rare Squamous epithelial cells per low power field    Rare Gram positive cocci in pairs per oil power field  Final Report (01-20-20 @ 18:14):    Normal Respiratory Harvey present              EXAM:  CT ABDOMEN AND PELVIS IC    EXAM:  CT ANGIO CHEST (W)AW IC                        PROCEDURE DATE:  01/21/2020    INTERPRETATION:  CLINICAL INFORMATION: Reevaluate pulmonary embolus  COMPARISON: CTA chest 1/13/2020    PROCEDURE:   CT Angiography of the Chest was performed followed by portal venous phase imaging of the Abdomen and Pelvis.  IV Contrast: 90 ml of Omnipaque 350 was injected intravenously. 10 ml were discarded.  Oral contrast: None.  Sagittal and coronal reformats were performed as well as 3D (MIP) reconstructions.  FINDINGS:  CHEST:     There are pulmonary emboli within the right lower lobar arteries and the left lower lobar arteries extending into the distal segments as seen on prior study. The pulmonary emboli seen in the right and left pulmonary arteries are normal longer visualized.    The heart size is normal. No pericardial effusion. There are a few mediastinal lymph nodes. No lymphadenopathy.    No endobronchial lesions. Bilateral wedgelike opacities in the lower lobes. Trace right pleural effusion.    Calcification within the left thyroid lobe. The upper abdomen is unremarkable.    Age indeterminate right-sided rib fractures. Chronic left-sided rib fractures.    ABDOMEN AND PELVIS:    The liver is unremarkable. Mild periportal edema. Vicarious excretion of contrast within the gallbladder. Surgical clips seen at the pancreatic tail. The spleen is not visualized.    The adrenal glands are unremarkable. Subcentimeter low attenuating lesions within the right kidney.    The left kidney is unremarkable. No hydronephrosis. The urinary bladder is collapsed around Martinez catheter balloon. The prostate is normal in size.  Bilateral corpora cavernosum implants.    No bowel obstruction. Normal appendix.    Calcified plaques within the abdominal aorta. Status post infrarenal IVC filter placement. Poor opacification of the IVC limiting evaluation for thrombus surrounding the IVC filter.    There is a right lower quadrant drainage catheter traversing a right lower quadrant fluid collection in the peritoneum. The tip of this changed catheters correlate within the fluid collection, which measures 6.9 x 3.9 cm, previously measuring 9.5 x 5.0 cm.    Anasarca.    Degenerative changes.    IMPRESSION:     Pulmonary emboli within the right lower lobar arteries and left lower lobar arteries extending into the distal segments as seen on prior study. The pulmonary emboli seen in the right and left pulmonary arteries are no longer visualized.    New bilateral wedgelike opacities in the lower lobes, which may represent pulmonary infarcts.    Interval decrease in size in the right lower quadrant fluid collection.        REVIEW OF SYSTEMS  Gen: + weakness   Skin: No rashes  Head/Eyes/Ears/Mouth: No headache; Normal hearing; Normal vision w/o blurriness; No sinus pain/discomfort, sore throat  Respiratory: no SOB  CV: No chest pain, PND, orthopnea  GI: incisional tenderness   : anuric  MSK: + lower extrem edema, RUE edema, flank edema  Neuro: No dizziness/lightheadedness, weakness, seizures, numbness, tingling  Heme: No easy bruising or bleeding  Endo: No heat/cold intolerance  Psych: No significant nervousness, anxiety, stress, depression    PHYSICAL EXAM:    Constitutional: NAD  	Eyes: Anicteric, PERRLA  	Respiratory: + crackles bases  	Cardiovascular: RRR  	Gastrointestinal: Soft abdomen, appropriate incisional TTP, ND.  wound vac ss/murky output (wound seen during recent wound vac change, good granulation tissue developing), RLQ drain with SS drainage    Genitourinary:  anuric    Extremities: b/l 2-3+ lower ext edema ace wrapped, flank edema, RUE edema slightly improved   	Vascular: DP signal bilateral, L AVF palpable  	Neurological: A&O x3.  	Skin: no new lesions/ulcerations  	Musculoskeletal: Moving all extremities

## 2020-01-25 NOTE — PROGRESS NOTE ADULT - ASSESSMENT
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 RVP with Influenza A s/p Tamiflu treatment course. Now with infected carlos-transplant hematoma.    1/1: s/p Ex-Lap and washout of infected hematoma (Enterobacter from cultures)  1/6: s/p IR guided drainage attempt of hematoma on 1/6 (NGTD from cultures)  1/10: s/p transplant nephrectomy, repair of right external iliac artery with a bovine pericardial patch, repair of right external iliac vein and thrombectomy of right lower extremity veins (Surgical cultures with Enterobacter)  1/15: s/p IR guided drainage (220cc of hematoma)  1/22: s/p IR drain repositioning and upsizing (Gram stain with GNR but so far NGTD)    Some of the Enterobacter isolates R and S to Ertapenem  per core lab Ertapenem at borderline of Susceptible and Resistant breakpoint  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   I suspect Enterobacter is the primary pathogen    PEA arrest and Torsades on 1/13  CT C/A/P (1/13) with Saddle PE, interval increase in size of RP fluid collection (infected hematoma)  CT C/A/P (1/21) with pulmonary infarcts and residual hematoma (decreased in size)    Despite high grade fevers patients white count is improving, procalcitonin decreasing and clinically otherwise improving. Suspect fevers secondary to either the pulmonary infarcts or from the residual hematoma/collection.   Fluid sampling from 1/22 drain repositioning/upsize with GNR on gram stain.   So far no growth to date. Anticipate Enterobacter if there is growth    Overall, Saddle pulmonary embolus, Fever (worsening trend), Leukocytosis (increasing), Infected Hematoma, Positive Urine Culture, Influenza Infection, Renal Transplant Recipient     overall better  alert non toxic and temps are not too high  100.8 max      --Continue Meropenem 500 mg IV Q24H  --Would consider discontinuing Micafungin - no evidence of yeast in prior cultures.   no new culture results yet   recent bc negative   call ID for any issues over the rest of weekend   -

## 2020-01-25 NOTE — PROGRESS NOTE ADULT - SUBJECTIVE AND OBJECTIVE BOX
[facility] DIVISION OF KIDNEY DISEASES AND HYPERTENSION -- FOLLOW UP NOTE  --------------------------------------------------------------------------------  Chief Complaint:    24 hour events/subjective:        PAST HISTORY  --------------------------------------------------------------------------------  No significant changes to PMH, PSH, FHx, SHx, unless otherwise noted    ALLERGIES & MEDICATIONS  --------------------------------------------------------------------------------  Allergies    coconut (Anaphylaxis)  morphine (Pruritus; Rash)    Intolerances      Standing Inpatient Medications  allopurinol 100 milliGRAM(s) Oral daily  aMIOdarone    Tablet 200 milliGRAM(s) Oral two times a day  apixaban 5 milliGRAM(s) Oral two times a day  aspirin  chewable 81 milliGRAM(s) Oral daily  brimonidine 0.2% Ophthalmic Solution 1 Drop(s) Both EYES three times a day  chlorhexidine 2% Cloths 1 Application(s) Topical <User Schedule>  epoetin trey Injectable 00377 Unit(s) IV Push <User Schedule>  gabapentin 100 milliGRAM(s) Oral every 12 hours  latanoprost 0.005% Ophthalmic Solution 1 Drop(s) Both EYES daily  meropenem  IVPB 500 milliGRAM(s) IV Intermittent every 12 hours  micafungin IVPB 100 milliGRAM(s) IV Intermittent every 24 hours  Nephro-candace 1 Tablet(s) Oral daily    PRN Inpatient Medications  acetaminophen   Tablet .. 975 milliGRAM(s) Oral every 6 hours PRN  oxyCODONE    IR 10 milliGRAM(s) Oral every 6 hours PRN  oxyCODONE    IR 5 milliGRAM(s) Oral every 4 hours PRN      REVIEW OF SYSTEMS  --------------------------------------------------------------------------------  Gen: No fatigue, fevers/chills, weakness  Skin: No rashes  Head/Eyes/Ears/Mouth: No headache;No sore throat  Respiratory: No dyspnea, cough,   CV: No chest pain, PND, orthopnea  GI: No abdominal pain, diarrhea, constipation, nausea, vomiting  Transplant: No pain  : No increased frequency, dysuria, hematuria, nocturia  MSK: No joint pain/swelling; no back pain; no edema  Neuro: No dizziness/lightheadedness, weakness, seizures, numbness, tingling  Psych: No significant nervousness, anxiety, stress, depression    All other systems were reviewed and are negative, except as noted.    VITALS/PHYSICAL EXAM  --------------------------------------------------------------------------------  T(C): 37 (01-25-20 @ 07:00), Max: 38.2 (01-24-20 @ 16:00)  HR: 61 (01-25-20 @ 07:00) (57 - 81)  BP: 102/56 (01-25-20 @ 07:00) (86/54 - 155/72)  RR: 15 (01-25-20 @ 07:00) (5 - 23)  SpO2: 99% (01-25-20 @ 07:00) (97% - 100%)  Wt(kg): --        01-24-20 @ 07:01  -  01-25-20 @ 07:00  --------------------------------------------------------  IN: 300 mL / OUT: 75 mL / NET: 225 mL      Physical Exam:  	Gen: NAD  	HEENT: PERRL, supple neck, clear oropharynx  	Pulm: CTA B/L  	CV: RRR, S1S2; no rub  	Back: No spinal or CVA tenderness; no sacral edema  	Abd: +BS, soft, nontender/nondistended                      Transplant: No tenderness, swelling  	: No suprapubic tenderness  	UE: Warm, FROM; no edema; no asterixis  	LE: Warm, FROM; no edema  	Neuro: No focal deficits  	Psych: Normal affect and mood  	Skin: Warm, without rashes      LABS/STUDIES  --------------------------------------------------------------------------------              7.7    15.03 >-----------<  795      [01-25-20 @ 06:13]              24.1     133  |  94  |  20  ----------------------------<  89      [01-25-20 @ 06:13]  3.8   |  27  |  5.90        Ca     8.9     [01-25-20 @ 06:13]      Mg     2.1     [01-25-20 @ 06:13]      Phos  3.1     [01-25-20 @ 06:13]    TPro  5.8  /  Alb  2.1  /  TBili  1.2  /  DBili  0.8  /  AST  20  /  ALT  5   /  AlkPhos  307  [01-25-20 @ 06:13]    PT/INR: PT 19.5 , INR 1.67       [01-25-20 @ 06:13]  PTT: 42.4       [01-25-20 @ 06:13]      Creatinine Trend:  SCr 5.90 [01-25 @ 06:13]  SCr 4.59 [01-24 @ 01:39]  SCr 4.21 [01-23 @ 21:42]  SCr 6.40 [01-23 @ 01:14]  SCr 4.72 [01-22 @ 00:38]          BK Virus DNA by PCR:   [facility] Assay Range: 39 copies/mL to 1.00E+10 copies/mL  The limit of   quantitation (LOQ) is 39 copies/mL. BK virus DNA  detected below the LOQ will be reported as Detected:<39 copies/mL.  This test was developed and its performance characteristics  determined by [facility]. It has not been cleared or approved  by the U.S. Food and Drug Administration. Results should be used in  conjunction with clinical findings, and should not form the sole  basis for a diagnosis or treatment decision.  ____________________________________________________________  Performed at:  [facility]  [address] [place] [address]  Laura Pettit PhD HCLD(ABB)  CLIA# [id number] (01-14 @ 11:42)      Urinalysis - [12-29-19 @ 03:40]      Color Dark Brown / Appearance Turbid / SG 1.021 / pH 7.5      Gluc Negative / Ketone Trace  / Bili Small / Urobili <2 mg/dL       Blood Large / Protein 100 mg/dL / Leuk Est Large / Nitrite Negative      RBC >720 / WBC >720 / Hyaline 5 / Gran  / Sq Epi  / Non Sq Epi 8 / Bacteria Negative      HbA1c 5.2      [01-13-20 @ 22:59]    HBsAb 351.0      [01-11-20 @ 16:09]  HBsAg Nonreact      [01-11-20 @ 16:09]  HBcAb Nonreact      [01-11-20 @ 16:09]  HCV 0.09, Nonreact      [01-11-20 @ 16:09] [facility] DIVISION OF KIDNEY DISEASES AND HYPERTENSION -- FOLLOW UP NOTE  --------------------------------------------------------------------------------  Chief Complaint: Fever    24 hour events/subjective:  Patient was seen and examined at bedside  Denies any complaints.   had a temp of 100.6 overnight    PAST HISTORY  --------------------------------------------------------------------------------  No significant changes to PMH, PSH, FHx, SHx, unless otherwise noted    ALLERGIES & MEDICATIONS  --------------------------------------------------------------------------------  Allergies    coconut (Anaphylaxis)  morphine (Pruritus; Rash)    Intolerances      Standing Inpatient Medications  allopurinol 100 milliGRAM(s) Oral daily  aMIOdarone    Tablet 200 milliGRAM(s) Oral two times a day  apixaban 5 milliGRAM(s) Oral two times a day  aspirin  chewable 81 milliGRAM(s) Oral daily  brimonidine 0.2% Ophthalmic Solution 1 Drop(s) Both EYES three times a day  chlorhexidine 2% Cloths 1 Application(s) Topical <User Schedule>  epoetin trey Injectable 11716 Unit(s) IV Push <User Schedule>  gabapentin 100 milliGRAM(s) Oral every 12 hours  latanoprost 0.005% Ophthalmic Solution 1 Drop(s) Both EYES daily  meropenem  IVPB 500 milliGRAM(s) IV Intermittent every 12 hours  micafungin IVPB 100 milliGRAM(s) IV Intermittent every 24 hours  Nephro-candace 1 Tablet(s) Oral daily    PRN Inpatient Medications  acetaminophen   Tablet .. 975 milliGRAM(s) Oral every 6 hours PRN  oxyCODONE    IR 10 milliGRAM(s) Oral every 6 hours PRN  oxyCODONE    IR 5 milliGRAM(s) Oral every 4 hours PRN      REVIEW OF SYSTEMS  --------------------------------------------------------------------------------  Gen: No fatigue, fevers/chills, weakness  Skin: No rashes  Head/Eyes/Ears/Mouth: No headache;No sore throat  Respiratory: No dyspnea, cough,   CV: No chest pain, PND, orthopnea  GI: No abdominal pain, diarrhea, constipation, nausea, vomiting  Transplant: No pain  : No increased frequency, dysuria, hematuria, nocturia  MSK: No joint pain/swelling; no back pain; no edema  Neuro: No dizziness/lightheadedness, weakness, seizures, numbness, tingling  Psych: No significant nervousness, anxiety, stress, depression    All other systems were reviewed and are negative, except as noted.    VITALS/PHYSICAL EXAM  --------------------------------------------------------------------------------  T(C): 37 (01-25-20 @ 07:00), Max: 38.2 (01-24-20 @ 16:00)  HR: 61 (01-25-20 @ 07:00) (57 - 81)  BP: 102/56 (01-25-20 @ 07:00) (86/54 - 155/72)  RR: 15 (01-25-20 @ 07:00) (5 - 23)  SpO2: 99% (01-25-20 @ 07:00) (97% - 100%)  Wt(kg): --        01-24-20 @ 07:01  -  01-25-20 @ 07:00  --------------------------------------------------------  IN: 300 mL / OUT: 75 mL / NET: 225 mL      Physical Exam:  	Gen: NAD  	HEENT: PERRL, supple neck, clear oropharynx  	Pulm: CTA B/L  	CV: RRR, S1S2; no rub  	Back: No spinal or CVA tenderness; no sacral edema  	Abd: +BS, soft, nontender/nondistended                      Transplant: No tenderness, swelling  	: No suprapubic tenderness  	UE: Warm, FROM; no edema; no asterixis  	LE: Warm, FROM; no edema  	Neuro: No focal deficits  	Psych: Normal affect and mood  	Skin: Warm, without rashes      LABS/STUDIES  --------------------------------------------------------------------------------              7.7    15.03 >-----------<  795      [01-25-20 @ 06:13]              24.1     133  |  94  |  20  ----------------------------<  89      [01-25-20 @ 06:13]  3.8   |  27  |  5.90        Ca     8.9     [01-25-20 @ 06:13]      Mg     2.1     [01-25-20 @ 06:13]      Phos  3.1     [01-25-20 @ 06:13]    TPro  5.8  /  Alb  2.1  /  TBili  1.2  /  DBili  0.8  /  AST  20  /  ALT  5   /  AlkPhos  307  [01-25-20 @ 06:13]    PT/INR: PT 19.5 , INR 1.67       [01-25-20 @ 06:13]  PTT: 42.4       [01-25-20 @ 06:13]      Creatinine Trend:  SCr 5.90 [01-25 @ 06:13]  SCr 4.59 [01-24 @ 01:39]  SCr 4.21 [01-23 @ 21:42]  SCr 6.40 [01-23 @ 01:14]  SCr 4.72 [01-22 @ 00:38]          BK Virus DNA by PCR:   [facility] Assay Range: 39 copies/mL to 1.00E+10 copies/mL  The limit of   quantitation (LOQ) is 39 copies/mL. BK virus DNA  detected below the LOQ will be reported as Detected:<39 copies/mL.  This test was developed and its performance characteristics  determined by [facility]. It has not been cleared or approved  by the U.S. Food and Drug Administration. Results should be used in  conjunction with clinical findings, and should not form the sole  basis for a diagnosis or treatment decision.  ____________________________________________________________  Performed at:  [facility]  [address] [place]  [place] [address]  Laura Pettit PhD HCLD(ABB)  CLIA# [id number] (01-14 @ 11:42)      Urinalysis - [12-29-19 @ 03:40]      Color Dark Brown / Appearance Turbid / SG 1.021 / pH 7.5      Gluc Negative / Ketone Trace  / Bili Small / Urobili <2 mg/dL       Blood Large / Protein 100 mg/dL / Leuk Est Large / Nitrite Negative      RBC >720 / WBC >720 / Hyaline 5 / Gran  / Sq Epi  / Non Sq Epi 8 / Bacteria Negative      HbA1c 5.2      [01-13-20 @ 22:59]    HBsAb 351.0      [01-11-20 @ 16:09]  HBsAg Nonreact      [01-11-20 @ 16:09]  HBcAb Nonreact      [01-11-20 @ 16:09]  HCV 0.09, Nonreact      [01-11-20 @ 16:09]

## 2020-01-25 NOTE — PROGRESS NOTE ADULT - ASSESSMENT
Sai Prado is a 48 y/o male presenting with influenza A with a hospital course complicated by:  - Infected transplant perinephric hematoma s/p right groin exploration, washout, & biopsy c/b persistent perinephric collection s/p FNA  - Right external iliac & common femoral DVTs s/p IVC filter & thrombectomy of the RLE veins  - Gross hematuria w/ pseudoaneurysm of the right external iliac & transplant renal artery seen on IR angiogram s/p right groin exploration, washout of the RP space, and transplant nephrectomy w/ repair of the right external iliac artery w/ a bovine patch  - Persistent right iliac fossa collection s/p IR drainage  - Cardiac arrest secondary to a saddle PE    PLAN:  Neuro: acute post-op pain  - Monitor mental status  - Pain control with acetaminophen, oxycodone, Dilaudid, and gabapentin    Resp: saddle PE with pulmonary infarct sequela  - Monitor pulse oximeter  - Supportive cares and continued anticoagulation for infarction. Daily CXRs.  - OOB/amb/encourage IS to prevent atelectasis    CV: obstructive shock secondary to PE (resolved), s/p PEA arrest with progression to torsades  - Monitor vital signs  - Amiodarone for episode of torsades  - Currently holding home metoprolol 25 mg PO q12hrs for HTN, home hydralazine 50 mg PO BID.    GI: no acute issues  - Regular diet as tolerated  - Bowel regimen with senna & Miralax  - upsized IR drain for R iliac fossa, 60 cc output yesterday.    Renal: CKD stage V s/p DDRT c/b DGF & perinephric collection s/p right groin exploration, washout, & biopsy c/b persistent perinephric collection s/p FNA; gross hematuria w/ pseudoaneurysm of the right external iliac & transplant renal artery s/p right groin exploration, washout of the RP space, & transplant nephrectomy w/ repair of the right external iliac artery w/ a bovine patch; persistent right iliac fossa collection s/p IR drainage  - Dialysis likely today, last dialysis 1/23/2020    Heme: anemia of chronic diseases, saddle PE, right external iliac & common femoral DVTs s/p IVC filter  - Monitor CBC and coags  - Heparin infusion for anticoagulation for DVTs and PE  - Epogen resumed    ID: Enterobacter cloacae UTI, transplant perinephric collection and right iliac fossa collection with carbapenem resistant Enterobacter cloacae (CRE) and E. coli.  - Sputum on 1/18 with normal respiratory harvey  - Blood cultures from 1/16 no growth to date, 1/21 no growth to date.  - Right iliac fossa collection culture from 1/15 with no growth.  - RP hematoma culture from 1/10 with carbapenem resistant Enterobacter cloacae  - On Meropenem and Micafungin  - Trending Procalcitonin, and monitoring for fevers +/- leukocytosis.    Endo: No acute issues  - Monitor glucose on BMP    Misc: Gout, glaucoma  - Home allopurinol for gout  - Home eyedrops (latanoprost, dorzolamide, & brimonidine) for glaucoma  - PT/OT  - Physical Medicine and Rehabilitation consulted -- appreciate recommendations.    Disposition:  - Full code  - Will remain in SICU

## 2020-01-26 LAB
ALBUMIN SERPL ELPH-MCNC: 1.7 G/DL — LOW (ref 3.3–5)
ALP SERPL-CCNC: 285 U/L — HIGH (ref 40–120)
ALT FLD-CCNC: <5 U/L — LOW (ref 10–45)
APTT BLD: 40 SEC — HIGH (ref 27.5–36.3)
AST SERPL-CCNC: 22 U/L — SIGNIFICANT CHANGE UP (ref 10–40)
BILIRUB DIRECT SERPL-MCNC: 0.4 MG/DL — HIGH (ref 0–0.2)
BILIRUB INDIRECT FLD-MCNC: 0.3 MG/DL — SIGNIFICANT CHANGE UP (ref 0.2–1)
BILIRUB SERPL-MCNC: 0.7 MG/DL — SIGNIFICANT CHANGE UP (ref 0.2–1.2)
BUN SERPL-MCNC: 12 MG/DL — SIGNIFICANT CHANGE UP (ref 7–23)
CALCIUM SERPL-MCNC: 8.6 MG/DL — SIGNIFICANT CHANGE UP (ref 8.4–10.5)
CHLORIDE SERPL-SCNC: 97 MMOL/L — SIGNIFICANT CHANGE UP (ref 96–108)
CO2 SERPL-SCNC: 26 MMOL/L — SIGNIFICANT CHANGE UP (ref 22–31)
CREAT SERPL-MCNC: 4.16 MG/DL — HIGH (ref 0.5–1.3)
CULTURE RESULTS: SIGNIFICANT CHANGE UP
CULTURE RESULTS: SIGNIFICANT CHANGE UP
GLUCOSE SERPL-MCNC: 91 MG/DL — SIGNIFICANT CHANGE UP (ref 70–99)
HCT VFR BLD CALC: 23 % — LOW (ref 39–50)
HGB BLD-MCNC: 7.2 G/DL — LOW (ref 13–17)
INR BLD: 1.82 RATIO — HIGH (ref 0.88–1.16)
MAGNESIUM SERPL-MCNC: 1.9 MG/DL — SIGNIFICANT CHANGE UP (ref 1.6–2.6)
MCHC RBC-ENTMCNC: 29.4 PG — SIGNIFICANT CHANGE UP (ref 27–34)
MCHC RBC-ENTMCNC: 31.3 GM/DL — LOW (ref 32–36)
MCV RBC AUTO: 93.9 FL — SIGNIFICANT CHANGE UP (ref 80–100)
NRBC # BLD: 0 /100 WBCS — SIGNIFICANT CHANGE UP (ref 0–0)
PHOSPHATE SERPL-MCNC: 1.9 MG/DL — LOW (ref 2.5–4.5)
PLATELET # BLD AUTO: 822 K/UL — HIGH (ref 150–400)
POTASSIUM SERPL-MCNC: 4 MMOL/L — SIGNIFICANT CHANGE UP (ref 3.5–5.3)
POTASSIUM SERPL-SCNC: 4 MMOL/L — SIGNIFICANT CHANGE UP (ref 3.5–5.3)
PROCALCITONIN SERPL-MCNC: 1.58 NG/ML — HIGH (ref 0.02–0.1)
PROT SERPL-MCNC: 5.7 G/DL — LOW (ref 6–8.3)
PROTHROM AB SERPL-ACNC: 21.1 SEC — HIGH (ref 10–12.9)
RBC # BLD: 2.45 M/UL — LOW (ref 4.2–5.8)
RBC # FLD: 16.7 % — HIGH (ref 10.3–14.5)
SODIUM SERPL-SCNC: 135 MMOL/L — SIGNIFICANT CHANGE UP (ref 135–145)
SPECIMEN SOURCE: SIGNIFICANT CHANGE UP
SPECIMEN SOURCE: SIGNIFICANT CHANGE UP
WBC # BLD: 14.69 K/UL — HIGH (ref 3.8–10.5)
WBC # FLD AUTO: 14.69 K/UL — HIGH (ref 3.8–10.5)

## 2020-01-26 PROCEDURE — 99232 SBSQ HOSP IP/OBS MODERATE 35: CPT

## 2020-01-26 RX ORDER — SODIUM,POTASSIUM PHOSPHATES 278-250MG
1 POWDER IN PACKET (EA) ORAL ONCE
Refills: 0 | Status: COMPLETED | OUTPATIENT
Start: 2020-01-26 | End: 2020-01-26

## 2020-01-26 RX ADMIN — Medication 100 MILLIGRAM(S): at 11:23

## 2020-01-26 RX ADMIN — OXYCODONE HYDROCHLORIDE 10 MILLIGRAM(S): 5 TABLET ORAL at 12:00

## 2020-01-26 RX ADMIN — Medication 1 TABLET(S): at 11:24

## 2020-01-26 RX ADMIN — Medication 81 MILLIGRAM(S): at 11:24

## 2020-01-26 RX ADMIN — OXYCODONE HYDROCHLORIDE 10 MILLIGRAM(S): 5 TABLET ORAL at 18:45

## 2020-01-26 RX ADMIN — MEROPENEM 100 MILLIGRAM(S): 1 INJECTION INTRAVENOUS at 17:52

## 2020-01-26 RX ADMIN — APIXABAN 5 MILLIGRAM(S): 2.5 TABLET, FILM COATED ORAL at 17:52

## 2020-01-26 RX ADMIN — OXYCODONE HYDROCHLORIDE 10 MILLIGRAM(S): 5 TABLET ORAL at 01:24

## 2020-01-26 RX ADMIN — Medication 1 PACKET(S): at 14:46

## 2020-01-26 RX ADMIN — MICAFUNGIN SODIUM 105 MILLIGRAM(S): 100 INJECTION, POWDER, LYOPHILIZED, FOR SOLUTION INTRAVENOUS at 14:46

## 2020-01-26 RX ADMIN — OXYCODONE HYDROCHLORIDE 10 MILLIGRAM(S): 5 TABLET ORAL at 18:00

## 2020-01-26 RX ADMIN — APIXABAN 5 MILLIGRAM(S): 2.5 TABLET, FILM COATED ORAL at 05:27

## 2020-01-26 RX ADMIN — OXYCODONE HYDROCHLORIDE 5 MILLIGRAM(S): 5 TABLET ORAL at 23:29

## 2020-01-26 RX ADMIN — OXYCODONE HYDROCHLORIDE 10 MILLIGRAM(S): 5 TABLET ORAL at 11:23

## 2020-01-26 RX ADMIN — GABAPENTIN 100 MILLIGRAM(S): 400 CAPSULE ORAL at 17:52

## 2020-01-26 RX ADMIN — AMIODARONE HYDROCHLORIDE 200 MILLIGRAM(S): 400 TABLET ORAL at 05:27

## 2020-01-26 RX ADMIN — LATANOPROST 1 DROP(S): 0.05 SOLUTION/ DROPS OPHTHALMIC; TOPICAL at 11:24

## 2020-01-26 RX ADMIN — OXYCODONE HYDROCHLORIDE 10 MILLIGRAM(S): 5 TABLET ORAL at 00:54

## 2020-01-26 RX ADMIN — GABAPENTIN 100 MILLIGRAM(S): 400 CAPSULE ORAL at 05:27

## 2020-01-26 RX ADMIN — MEROPENEM 100 MILLIGRAM(S): 1 INJECTION INTRAVENOUS at 05:13

## 2020-01-26 RX ADMIN — BRIMONIDINE TARTRATE 1 DROP(S): 2 SOLUTION/ DROPS OPHTHALMIC at 23:31

## 2020-01-26 RX ADMIN — BRIMONIDINE TARTRATE 1 DROP(S): 2 SOLUTION/ DROPS OPHTHALMIC at 14:46

## 2020-01-26 RX ADMIN — BRIMONIDINE TARTRATE 1 DROP(S): 2 SOLUTION/ DROPS OPHTHALMIC at 05:18

## 2020-01-26 NOTE — PROGRESS NOTE ADULT - SUBJECTIVE AND OBJECTIVE BOX
Transplant Surgery - Multidisciplinary Rounds  --------------------------------------------------------------  R DDRT    Date:  12/8/19     Present:   Patient seen and examined with Dr. Be, Dr. BRITT Puckett, MAEVE Sun, unit RN during am rounds. Disciplines not in attendance will be notified of the plan.     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 Young, followed by Dr. Raphael, [facility] Oncologist), RA with hand contractures. He underwent DDRT on 12/8/19. Post op course c/b DGF requiring HD, thrombocytopenia, elevated LDH and haptoglobin. Schistocytes  on peripheral smear concerning for TMA. Envarsus held. Received Belatacept 12/13. Started on PLEX (12/12, 12/15). Underwent renal bx on 12/13 c/w profound ATN.  Discharged home on 12/20/19 w/ outpatient HD.     Re-admitted 12/27 with influenza, completed treatement with Tamiflu on 12/31. Urine cx on admission grew Ent cloacae, was started on meropenem 12/31. Renal US on admission with increasing hematoma.   ·	OR 1/1 for ex-lap, hematoma evacuation, washout and renal bx. (c/w ATN)  OR cxs grew CRE and e coli. Post op course c/b:   ·	fevers and leukocytosis, CT a/p (1/4) with increased perinephric collection. Underwent IR guided collection 1/6   ·	New onset hematuria, martinez inserted 1/7, started CBI  ·	RLE edema, RLE doppler (1/8) with acute above the knee thrombus of R external iliac, common femoral and femoral veins, Acute calf vein thrombus affecting R soleal vein, Heparin gtt initiated   ·	AMS 1/9  (head CT neg), hyponatremic  ·	Significant hematuria and bleeding around the martinez. Angio 1/9 showed actively bleeding pseudoaneurysm at the anastomosis of the transplant renal artery with the recipient iliac artery. A stent was placed in the iliac artery to  control hemorrhage. IVC filter placed.   ·	Emergent OR 1/9-1/10 for graft nephrectomy;  ureter to bladder anastomosis was completely disrupted, repaired the R external iliac artery with bovine pericardial patch, repaired right external iliac vein, performed thrombectomy of RLE veins, Intra op received 3u PRBC.   ·	Extubated 1/11  ·	1/13 Saddle PE, with right heart strain and complicated by PEA arrest followed by Torsade, ROSC achieved after ACLS; re-intubated  ·	1/15 Renal US with 13.7 x 5.0 x 1.5 cm in RLQ transplant bed; s/p bedside IR guided drainage, pigtail in place. Fluid cx to date with NG  ·	1/21 CT C/A/P showing persistent collection, catheter at superior portion of collection  ·	1/22 IR pigtail drain repositioned and upsized to 12Fr catheter w/ increased output  ·	1/23 Transitioned to Eliquis from Heparin drip    Interval events:    - TMax 38.1.  Continued on Meropenem and Micafugin  - Transferred to floor from SICU yesterday  - continued LE edema and RUE edema, slow improvement  - on Eliquis with no signs of bleeding. BO ss.  - anuric, no issues after martinez removal  - Tolerating HD as scheduled, TTS  - no new complaints at this time    cxs:   12/29: Urine Cx: Enterobacter Cloacae  1/1 OR Perinephric collection Cx:  Carbapenem resistant Ent Cloacae, E. Coli  1/1: R kidney abscess swab: Enterobacter Cloacae  1/1  OR tissue: Enterobacter Cloacae  1/5: Urine Cx: Enteropbacter Cloacae  1/5: Skin incision Cx (bedside): NG  1/6: Adominal fluid Cx from IR:  NG   1/7: Urine Cx:  NG  1/7: BCX2:  NG  1/10 retroperit hematoma - enerobacter   1/10 bladder hematoma - enterobacter  1/15 Drainage fluid - NGTD  1/16: BC X2: NGTD  1/18: SPutum: Normal harvey  1/21: BC X2: NTGD  1/22: IR body fluid Cx: GNR pre-milian    Potential Discharge date: pending clinical improvement      MEDICATIONS  (STANDING):  allopurinol 100 milliGRAM(s) Oral daily  apixaban 5 milliGRAM(s) Oral two times a day  aspirin  chewable 81 milliGRAM(s) Oral daily  brimonidine 0.2% Ophthalmic Solution 1 Drop(s) Both EYES three times a day  chlorhexidine 2% Cloths 1 Application(s) Topical <User Schedule>  epoetin trey Injectable 57980 Unit(s) IV Push <User Schedule>  gabapentin 100 milliGRAM(s) Oral every 12 hours  latanoprost 0.005% Ophthalmic Solution 1 Drop(s) Both EYES daily  meropenem  IVPB 500 milliGRAM(s) IV Intermittent every 12 hours  micafungin IVPB 100 milliGRAM(s) IV Intermittent every 24 hours  Nephro-candace 1 Tablet(s) Oral daily    MEDICATIONS  (PRN):  acetaminophen   Tablet .. 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  Carpal tunnel syndrome  Brain cyst: had MRI recently- now gone  Anemia  Gastric ulcer: Long time ago  Rheumatoid arthritis  Renal transplant recipient  Breakdown (mechanical) of penile (implanted) prosthesis, sequela  End-stage renal disease (ESRD): PERMACATH PLACEMENT  Abscess of left buttock: s/p I &amp; D  AV fistula: placement left lower arm  S/P cystoscopy: stent placement &amp; removal for kidney stones, lithiotripsy  s/p Lt Carpal tunnel: 2011      Vital Signs Last 24 Hrs  T(C): 37.7 (26 Jan 2020 09:00), Max: 38.1 (25 Jan 2020 17:44)  T(F): 99.8 (26 Jan 2020 09:00), Max: 100.5 (25 Jan 2020 17:44)  HR: 70 (26 Jan 2020 09:00) (70 - 80)  BP: 121/73 (26 Jan 2020 09:00) (94/59 - 140/94)  BP(mean): 83 (25 Jan 2020 17:00) (74 - 110)  RR: 18 (26 Jan 2020 09:00) (13 - 21)  SpO2: 94% (26 Jan 2020 09:00) (94% - 100%)    I&O's Summary    25 Jan 2020 07:01  -  26 Jan 2020 07:00  --------------------------------------------------------  IN: 1320 mL / OUT: 3025 mL / NET: -1705 mL                              7.2    14.69 )-----------( 822      ( 26 Jan 2020 06:07 )             23.0     01-26    135  |  97  |  12  ----------------------------<  91  4.0   |  26  |  4.16<H>    Ca    8.6      26 Jan 2020 06:07  Phos  1.9     01-26  Mg     1.9     01-26    TPro  5.7<L>  /  Alb  1.7<L>  /  TBili  0.7  /  DBili  0.4<H>  /  AST  22  /  ALT  <5<L>  /  AlkPhos  285<H>  01-26          Culture - Blood (collected 01-24-20 @ 05:14)  Source: .Blood Blood  Preliminary Report (01-25-20 @ 06:01):    No growth to date.    Culture - Fungal, Body Fluid (collected 01-24-20 @ 02:22)  Source: .Body Fluid RLQ Fluid Collection  Preliminary Report (01-24-20 @ 15:47):    Testing in progress    Culture - Blood (collected 01-24-20 @ 01:59)  Source: .Blood Blood  Preliminary Report (01-25-20 @ 02:03):    No growth to date.    Culture - Body Fluid with Gram Stain (collected 01-22-20 @ 22:15)  Source: .Body Fluid Abdominal Fluid  Gram Stain (01-23-20 @ 03:51):    polymorphonuclear leukocytes seen    Gram Negative Rods seen    by cytocentrifuge  Preliminary Report (01-23-20 @ 21:46):    No growth    Culture - Blood (collected 01-21-20 @ 05:11)  Source: .Blood Blood  Final Report (01-26-20 @ 06:01):    No growth at 5 days.    Culture - Blood (collected 01-21-20 @ 03:02)  Source: .Blood Blood  Final Report (01-26-20 @ 04:00):    No growth at 5 days.    ------------------------    EXAM:  CT ABDOMEN AND PELVIS IC    EXAM:  CT ANGIO CHEST (W)AW IC                        PROCEDURE DATE:  01/21/2020    INTERPRETATION:  CLINICAL INFORMATION: Reevaluate pulmonary embolus  COMPARISON: CTA chest 1/13/2020      IMPRESSION:     Pulmonary emboli within the right lower lobar arteries and left lower lobar arteries extending into the distal segments as seen on prior study. The pulmonary emboli seen in the right and left pulmonary arteries are no longer visualized.    New bilateral wedgelike opacities in the lower lobes, which may represent pulmonary infarcts.      There is a right lower quadrant drainage catheter traversing a right lower quadrant fluid collection in the peritoneum. The tip of this changed catheters correlate within the fluid collection, which measures 6.9 x 3.9 cm, previously measuring 9.5 x 5.0 cm.    Calcified plaques within the abdominal aorta. Status post infrarenal IVC filter placement. Poor opacification of the IVC limiting evaluation for thrombus surrounding the IVC filter.      -------------------    REVIEW OF SYSTEMS  Gen: + weakness   Skin: No rashes  Head/Eyes/Ears/Mouth: No headache; Normal hearing; Normal vision w/o blurriness; No sinus pain/discomfort, sore throat  Respiratory: no SOB  CV: No chest pain, PND, orthopnea  GI: incisional tenderness   : anuric  MSK: + lower extremity edema, RUE edema, flank edema  Neuro: No dizziness/lightheadedness, weakness, seizures, numbness, tingling  Heme: No easy bruising or bleeding  Endo: No heat/cold intolerance  Psych: No significant nervousness, anxiety, stress, depression    PHYSICAL EXAM:    Constitutional: NAD  	Eyes: Anicteric, PERRLA  	Respiratory: CTA b/l  	Cardiovascular: RRR  	Gastrointestinal: Soft abdomen, appropriate incisional TTP, ND.  wound vac ss/murky output (wound seen during recent wound vac change, good granulation tissue developing), RLQ drain with SS drainage    Genitourinary:  anuric    Extremities: b/l 2-3+ lower ext edema ace wrapped, no ulcerations or skin breakdown, flank edema, RUE edema slow improvement   	Vascular: DP signal bilateral, L AVF palpable  	Neurological: A&O x3.  	Skin: no new lesions/ulcerations  	Musculoskeletal: Moving all extremities

## 2020-01-26 NOTE — PROGRESS NOTE ADULT - ASSESSMENT
49M PMHx of ESRD on HD  ( via LUE AVF), HTN, Gout, Glaucoma, NET of pancreas, RA with hand contractures. s/p DDRT 12/8/19,  Post op course c/b DGF requiring HD. Renal bx x 2 c/w profound ATN.     Re-admitted with Influenza and perinephric hematoma. s/p hematoma evacuation/ abd washout and renal biopsy. Post op course c/b leukocytosis, infected hematoma, acute RLE DVT, and bleeding pseudoaneurysm at txp renal artery anastomosis. Now s/p graft nephrectomy. Course further complicated by saddle PE causing right heart strain and hemodynamic instability leading to cardiac arrest requiring ACLS, Required IR drainage of post-op collections x2. Transferred to floor on 1/25/2020.    [] DDRT c/b DGF/ATN/perinephric infected hematoma/pseudoneurysm/graft nephrectomy   - HD per nephrology team. Will discuss additional sessions this week to help with edema  - s/p bedside IR drainage of RLQ fluid collection (1/15), drain repositioned 1/22 and cultures sent  - FU final IR culture, fungal culture from 1/22  - low grade fevers daily.  likely reactive from PE/DVT/collection.  ID following;  cont Meropenem, Micafungin 100mg daily.  Will likely need long term antibiotics.   - pain control  - bowel regimen  - PT/OT/OOB.  VAC change per PT  - Keep B/L LE bandaged with ace wrap and skin care      [] Saddle PE, with right heart strain and complicated by PEA arrest followed by [place], ROSC achieved after ACLS  - vascular, CT and vascular cardiology following   - CT 1/21: There are pulmonary emboli within the right lower lobar arteries and the left lower lobar arteries extending into the distal segments as seen on prior study. The pulmonary emboli seen in the right and left pulmonary arteries are normal longer visualized. New distal to mid brachial DVT noted  1/19 Duplex: RUE Deep vein thrombosis in the mid to distal brachial vein.  - Continue Eliquis to 5mg PO BID   - continue ASA  - repeat ECHO with improved R heart strain    [] New acute DVT of R distal brachia vein   - continue anticoagulation     [] New acute DVT of R external iliac, common femoral, femoral veins, R soleal vein requiring heparin drip  - IVC filter placed 1/9, continue anticoagulation     [] Thrombocytosis  - rising INR/PLT counts over the past week. Will involve Heme    [] Dispo  - continue tele

## 2020-01-26 NOTE — PROGRESS NOTE ADULT - PROBLEM SELECTOR PLAN 3
ECHO 1/15/20 - improving right heart strain.   Hemodynamically stable. On Eliquis 5 mg BID and ASA 81 mg po daily.

## 2020-01-26 NOTE — PROGRESS NOTE ADULT - SUBJECTIVE AND OBJECTIVE BOX
· Subjective and Objective:   [facility] - Melissa Kelsey, Maximino Nunez, Hamzah Funes [place], [place]  Office Number:  [phone]    Patient resting comfortably in bed in NAD.  Laying flat with no respiratory distress.  No complaints of chest pain, dyspnea, palpitations, PND, or orthopnea.  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  Carpal tunnel syndrome  Brain cyst: had MRI recently- now gone  Anemia  Gastric ulcer: Long time ago  Rheumatoid arthritis  Renal transplant recipient  Breakdown (mechanical) of penile (implanted) prosthesis, sequela  End-stage renal disease (ESRD): PERMACATH PLACEMENT  Abscess of left buttock: s/p I &amp; D  AV fistula: placement left lower arm  S/P cystoscopy: stent placement &amp; removal for kidney stones, lithiotripsy  s/p Lt Carpal tunnel:       MEDICATIONS  (STANDING):  allopurinol 100 milliGRAM(s) Oral daily  apixaban 5 milliGRAM(s) Oral two times a day  aspirin  chewable 81 milliGRAM(s) Oral daily  brimonidine 0.2% Ophthalmic Solution 1 Drop(s) Both EYES three times a day  chlorhexidine 2% Cloths 1 Application(s) Topical <User Schedule>  epoetin trey Injectable 72514 Unit(s) IV Push <User Schedule>  gabapentin 100 milliGRAM(s) Oral every 12 hours  latanoprost 0.005% Ophthalmic Solution 1 Drop(s) Both EYES daily  meropenem  IVPB 500 milliGRAM(s) IV Intermittent every 12 hours  micafungin IVPB 100 milliGRAM(s) IV Intermittent every 24 hours  Nephro-candace 1 Tablet(s) Oral daily      Allergies    coconut (Anaphylaxis)  morphine (Pruritus; Rash)    Intolerances                              7.2    14.69 )-----------( 822      ( 2020 06:07 )             23.0       01-26    135  |  97  |  12  ----------------------------<  91  4.0   |  26  |  4.16<H>    Ca    8.6      2020 06:07  Phos  1.9       Mg     1.9         TPro  5.7<L>  /  Alb  1.7<L>  /  TBili  0.7  /  DBili  0.4<H>  /  AST  22  /  ALT  <5<L>  /  AlkPhos  285<H>        LIVER FUNCTIONS - ( 2020 06:07 )  Alb: 1.7 g/dL / Pro: 5.7 g/dL / ALK PHOS: 285 U/L / ALT: <5 U/L / AST: 22 U/L / GGT: x             PT/INR - ( 2020 06:07 )   PT: 21.1 sec;   INR: 1.82 ratio         PTT - ( 2020 06:07 )  PTT:40.0 sec                      Daily     Daily Weight in k (2020 16:15)    I&O's Summary    2020 07:01  -  2020 07:00  --------------------------------------------------------  IN: 1320 mL / OUT: 3025 mL / NET: -1705 mL        Vital Signs Last 24 Hrs  T(C): 37.7 (2020 09:00), Max: 38.1 (2020 17:44)  T(F): 99.8 (2020 09:00), Max: 100.5 (2020 17:44)  HR: 70 (2020 09:00) (70 - 80)  BP: 121/73 (2020 09:00) (94/59 - 140/94)  BP(mean): 83 (2020 17:00) (74 - 110)  RR: 18 (2020 09:00) (13 - 20)  SpO2: 94% (2020 09:00) (94% - 100%)    PHYSICAL EXAM:   · Constitutional	Well-developed, well nourished  · Eyes	EOMI; PERRL; no drainage or redness  · ENMT	No oral lesions; no gross abnormalities  · Neck	No bruits; no thyromegaly or nodules  · Respiratory	Normal breath sounds b/l, No RRW  · Cardiovascular	Regular rate & rhythm, normal S1, S2; no murmurs, gallops or rubs; no S3, S4  · Gastrointestinal	Soft, non-tender, no hepatosplenomegaly, normal bowel sounds  · Extremities	No cyanosis, clubbing or edema  · Vascular	Equal and normal pulses (carotid, femoral, dorsalis pedis)  · Neurological	Alert & oriented; no sensory, motor or coordination deficits, normal reflexes

## 2020-01-26 NOTE — CONSULT NOTE ADULT - SUBJECTIVE AND OBJECTIVE BOX
REASON FOR CONSULTATION:    HPI:    REVIEW OF SYSTEMS:    CONSTITUTIONAL: No weakness, fevers or chills  EYES/ENT: No visual changes;  No vertigo or throat pain   NECK: No pain or stiffness  RESPIRATORY: No cough, wheezing, hemoptysis; No shortness of breath  CARDIOVASCULAR: No chest pain or palpitations  GASTROINTESTINAL: No abdominal or epigastric pain. No nausea, vomiting, or hematemesis; No diarrhea or constipation. No melena or hematochezia.  GENITOURINARY: No dysuria, frequency or hematuria  NEUROLOGICAL: No numbness or weakness  SKIN: No itching, burning, rashes, or lesions   All other review of systems is negative unless indicated above.    Allergies    coconut (Anaphylaxis)  morphine (Pruritus; Rash)    Intolerances        MEDICATIONS  (STANDING):  allopurinol 100 milliGRAM(s) Oral daily  apixaban 5 milliGRAM(s) Oral two times a day  aspirin  chewable 81 milliGRAM(s) Oral daily  brimonidine 0.2% Ophthalmic Solution 1 Drop(s) Both EYES three times a day  chlorhexidine 2% Cloths 1 Application(s) Topical <User Schedule>  epoetin trey Injectable 81829 Unit(s) IV Push <User Schedule>  gabapentin 100 milliGRAM(s) Oral every 12 hours  latanoprost 0.005% Ophthalmic Solution 1 Drop(s) Both EYES daily  meropenem  IVPB 500 milliGRAM(s) IV Intermittent every 12 hours  micafungin IVPB 100 milliGRAM(s) IV Intermittent every 24 hours  Nephro-candace 1 Tablet(s) Oral daily    MEDICATIONS  (PRN):  acetaminophen   Tablet .. 975 milliGRAM(s) Oral every 6 hours PRN Mild Pain (1 - 3)  oxyCODONE    IR 10 milliGRAM(s) Oral every 6 hours PRN Severe Pain (7 - 10)  oxyCODONE    IR 5 milliGRAM(s) Oral every 4 hours PRN Moderate Pain (4 - 6)      Vital Signs Last 24 Hrs  T(C): 37.7 (26 Jan 2020 09:00), Max: 38.1 (25 Jan 2020 17:44)  T(F): 99.8 (26 Jan 2020 09:00), Max: 100.5 (25 Jan 2020 17:44)  HR: 70 (26 Jan 2020 09:00) (70 - 80)  BP: 121/73 (26 Jan 2020 09:00) (94/59 - 140/94)  BP(mean): 83 (25 Jan 2020 17:00) (74 - 110)  RR: 18 (26 Jan 2020 09:00) (13 - 20)  SpO2: 94% (26 Jan 2020 09:00) (94% - 100%)    PHYSICAL EXAM:    General: AOx3, no acute distress  EYES: EOMI, PERRLA, conjunctiva and sclera clear  CHEST/LUNG: Clear to auscultation bilaterally; no wheezes, crackles or rales  HEART: Regular rate and rhythm; no murmurs  ABDOMEN: Soft, Nontender, Nondistended; BS+  LYMPH: No lymphadenopathy noted.  Extremities: No peripheral edema    LABS:                        7.2    14.69 )-----------( 822      ( 26 Jan 2020 06:07 )             23.0     01-26    135  |  97  |  12  ----------------------------<  91  4.0   |  26  |  4.16<H>    Ca    8.6      26 Jan 2020 06:07  Phos  1.9     01-26  Mg     1.9     01-26    TPro  5.7<L>  /  Alb  1.7<L>  /  TBili  0.7  /  DBili  0.4<H>  /  AST  22  /  ALT  <5<L>  /  AlkPhos  285<H>  01-26    PT/INR - ( 26 Jan 2020 06:07 )   PT: 21.1 sec;   INR: 1.82 ratio         PTT - ( 26 Jan 2020 06:07 )  PTT:40.0 sec          RADIOLOGY & ADDITIONAL STUDIES:    PATHOLOGY: REASON FOR CONSULTATION: Thrombocytosis     HPI: 49M PMHx of ESRD on HD  ( via LUE AVF), HTN, Gout, Glaucoma, NET of pancreas, RA with hand contractures. s/p DDRT 12/8/19,  Post op course c/b DGF requiring HD. Renal bx x 2 c/w profound ATN.     Re-admitted with Influenza and perinephric hematoma, s/p hematoma evacuation/ abd washout and renal biopsy. Post op course c/b leukocytosis, infected hematoma, acute RLE DVT, and bleeding pseudoaneurysm at transplant renal artery anastomosis. Now s/p graft nephrectomy. Course further complicated by saddle PE causing right heart strain and hemodynamic instability leading to cardiac arrest requiring ACLS, Required IR drainage of post-op collections x2. Transferred to floor on 1/25/2020.    Hematology consulted for thrombocytosis to 800K today.     Patient seen and examined at the bedside. He is tearful and states that he has been through a lot and his body has been through a lot of stress. He denies any acute complaints of headaches, shortness of breath, chest pain, palpitations, or abdominal pain. He states that he is on dialysis, and has leg swelling on both legs which is not new. No complaint of pain at this time. He has no personal or family history of blood disorders.     REVIEW OF SYSTEMS:    CONSTITUTIONAL: No weakness, fevers or chills  EYES/ENT: No visual changes;  No vertigo or throat pain   NECK: No pain or stiffness  RESPIRATORY: No cough, wheezing, hemoptysis; No shortness of breath  CARDIOVASCULAR: No chest pain or palpitations  GASTROINTESTINAL: No abdominal or epigastric pain. No nausea, vomiting, or hematemesis; No diarrhea or constipation. No melena or hematochezia.  GENITOURINARY: No dysuria, frequency or hematuria  NEUROLOGICAL: No numbness or weakness  SKIN: No itching, burning, rashes, or lesions   All other review of systems is negative unless indicated above.    Patient History:    Past Medical, Past Surgical, and Family History:  PAST MEDICAL HISTORY:  Anemia     Brain cyst had MRI recently- now gone    Carpal tunnel syndrome     Contracture of finger joint From RA    Erectile dysfunction     ESRD (end stage renal disease) on dialysis since 7/2013 jarrett murry, sat    Gastric ulcer Long time ago    Glaucoma     Gout     HTN (hypertension)     Rheumatoid arthritis.     PAST SURGICAL HISTORY:  Abscess of left buttock s/p I & D    AV fistula placement left lower arm    Breakdown (mechanical) of penile (implanted) prosthesis, sequela     End-stage renal disease (ESRD) PERMACATH PLACEMENT    Renal transplant recipient     S/P cystoscopy stent placement & removal for kidney stones, lithiotripsy    s/p Lt Carpal tunnel 2011.     FAMILY HISTORY:  Family history of hypertension in mother  Family history of myocardial infarction in first degree male relative  Family history of rheumatoid arthritis    Sibling  Still living? Yes, Estimated age: Age Unknown  Family history of cerebral aneurysm, Age at diagnosis: Age Unknown.      Allergies    coconut (Anaphylaxis)  morphine (Pruritus; Rash)    Intolerances        MEDICATIONS  (STANDING):  allopurinol 100 milliGRAM(s) Oral daily  apixaban 5 milliGRAM(s) Oral two times a day  aspirin  chewable 81 milliGRAM(s) Oral daily  brimonidine 0.2% Ophthalmic Solution 1 Drop(s) Both EYES three times a day  chlorhexidine 2% Cloths 1 Application(s) Topical <User Schedule>  epoetin trey Injectable 88102 Unit(s) IV Push <User Schedule>  gabapentin 100 milliGRAM(s) Oral every 12 hours  latanoprost 0.005% Ophthalmic Solution 1 Drop(s) Both EYES daily  meropenem  IVPB 500 milliGRAM(s) IV Intermittent every 12 hours  micafungin IVPB 100 milliGRAM(s) IV Intermittent every 24 hours  Nephro-candace 1 Tablet(s) Oral daily    MEDICATIONS  (PRN):  acetaminophen   Tablet .. 975 milliGRAM(s) Oral every 6 hours PRN Mild Pain (1 - 3)  oxyCODONE    IR 10 milliGRAM(s) Oral every 6 hours PRN Severe Pain (7 - 10)  oxyCODONE    IR 5 milliGRAM(s) Oral every 4 hours PRN Moderate Pain (4 - 6)      Vital Signs Last 24 Hrs  T(C): 37.7 (26 Jan 2020 09:00), Max: 38.1 (25 Jan 2020 17:44)  T(F): 99.8 (26 Jan 2020 09:00), Max: 100.5 (25 Jan 2020 17:44)  HR: 70 (26 Jan 2020 09:00) (70 - 80)  BP: 121/73 (26 Jan 2020 09:00) (94/59 - 140/94)  BP(mean): 83 (25 Jan 2020 17:00) (74 - 110)  RR: 18 (26 Jan 2020 09:00) (13 - 20)  SpO2: 94% (26 Jan 2020 09:00) (94% - 100%)    PHYSICAL EXAM:    General: AOx3, no acute distress  EYES: EOMI, PERRLA, conjunctiva and sclera clear  CHEST/LUNG: Clear to auscultation bilaterally; no wheezes, crackles or rales  HEART: Regular rate and rhythm; no murmurs  ABDOMEN: Soft, Nontender, Nondistended; BS+  LYMPH: No lymphadenopathy noted.  Extremities: Bilateral lower extremity edema 2+ pitting, wrapped in ace bandages     LABS:                        7.2    14.69 )-----------( 822      ( 26 Jan 2020 06:07 )             23.0     01-26    135  |  97  |  12  ----------------------------<  91  4.0   |  26  |  4.16<H>    Ca    8.6      26 Jan 2020 06:07  Phos  1.9     01-26  Mg     1.9     01-26    TPro  5.7<L>  /  Alb  1.7<L>  /  TBili  0.7  /  DBili  0.4<H>  /  AST  22  /  ALT  <5<L>  /  AlkPhos  285<H>  01-26    PT/INR - ( 26 Jan 2020 06:07 )   PT: 21.1 sec;   INR: 1.82 ratio         PTT - ( 26 Jan 2020 06:07 )  PTT:40.0 sec          RADIOLOGY & ADDITIONAL STUDIES:    PATHOLOGY:

## 2020-01-26 NOTE — PROVIDER CONTACT NOTE (CRITICAL VALUE NOTIFICATION) - TEST AND RESULT REPORTED:
gram stain from body fluid collected on 1/22/2020 positive with polymorphonuclear leukocytes seen positive gram negative rods seen by centrifuge

## 2020-01-26 NOTE — CONSULT NOTE ADULT - ASSESSMENT
49M PMHx of ESRD on HD  ( via LUE AVF), HTN, Gout, Glaucoma, NET of pancreas, RA with hand contractures. s/p DDRT 12/8/19,  Post op course c/b DGF requiring HD. Renal bx x 2 c/w profound ATN. Re-admitted with Influenza and perinephric hematoma, s/p hematoma evacuation/ abd washout and renal biopsy. Post op course c/b leukocytosis, infected hematoma, acute RLE DVT, and bleeding pseudoaneurysm at transplant renal artery anastomosis. Now s/p graft nephrectomy. Course further complicated by saddle PE causing right heart strain and hemodynamic instability leading to cardiac arrest requiring ACLS, Required IR drainage of post-op collections x2. Transferred to floor on 1/25/2020. Hematology consulted for thrombocytosis.    #Thrombocytosis:  Pt with fluctuating plt counts- on admission, plt 300K-400K now with uptrending counts to 800K  This is most likely 2/2 reactive process given pt's recent hospital course including infection, hematoma evacuation, and cardiac arrest  - Please check iron studies, as iron deficiency can cause thrombocytosis, however pt's anemia likely 2/2 Anemia of chronic disease in setting of kidney failure.   - Will review peripheral smear   - Can also check B12, folate levels, and hemolysis labs- LDH and haptoglobin  - Would not recommend sending Jak2 or BCR-ABL labs at this time- those can be sent in an outpatient setting if plt continues to rise   - Continue to trend CBC with diff daily   - Continue eliquis for anticoagulation   - Will set up outpatient followup for pt on discharge       Case d/w attending Dr. Alejandrina Multani.     Tori Sierra MD  Hematology Oncology Fellow, PGY-4  [facility] Pager: [phone]/ [facility] Pager: [phone] 49M PMHx of ESRD on HD  ( via LUE AVF), HTN, Gout, Glaucoma, NET of pancreas, RA with hand contractures. s/p DDRT 12/8/19,  Post op course c/b DGF requiring HD. Renal bx x 2 c/w profound ATN. Re-admitted with Influenza and perinephric hematoma, s/p hematoma evacuation/ abd washout and renal biopsy. Post op course c/b leukocytosis, infected hematoma, acute RLE DVT, and bleeding pseudoaneurysm at transplant renal artery anastomosis. Now s/p graft nephrectomy. Course further complicated by saddle PE causing right heart strain and hemodynamic instability leading to cardiac arrest requiring ACLS, Required IR drainage of post-op collections x2. Transferred to floor on 1/25/2020. Hematology consulted for thrombocytosis.    #Thrombocytosis:  Pt with fluctuating plt counts- on admission, plt 300K-400K now with uptrending counts to 800K  This is most likely 2/2 reactive process given pt's recent hospital course including infection, hematoma evacuation, and cardiac arrest  - Please check iron studies, as iron deficiency can cause thrombocytosis, however pt's anemia likely 2/2 Anemia of chronic disease in setting of kidney failure.   - Peripheral smear reviewed- many large platelets seen, which corroborates a reactive process  - Can also check B12, folate levels, and hemolysis labs- LDH and haptoglobin  - Would not recommend sending Jak2 or BCR-ABL labs at this time- those can be sent in an outpatient setting if plt continues to rise   - Continue to trend CBC with diff daily   - Continue eliquis for anticoagulation   - Will set up outpatient followup for pt on discharge       Case d/w attending Dr. Alejandrina Multani.     Tori Sierra MD  Hematology Oncology Fellow, PGY-4  [facility] Pager: [phone]/ [facility] Pager: [phone] 49M PMHx of ESRD on HD  ( via LUE AVF), HTN, Gout, Glaucoma, NET of pancreas, RA with hand contractures. s/p DDRT 12/8/19,  Post op course c/b DGF requiring HD. Renal bx x 2 c/w profound ATN. Re-admitted with Influenza and perinephric hematoma, s/p hematoma evacuation/ abd washout and renal biopsy. Post op course c/b leukocytosis, infected hematoma, acute RLE DVT, and bleeding pseudoaneurysm at transplant renal artery anastomosis. Now s/p graft nephrectomy. Course further complicated by saddle PE causing right heart strain and hemodynamic instability leading to cardiac arrest requiring ACLS, Required IR drainage of post-op collections x2. Transferred to floor on 1/25/2020. Hematology consulted for thrombocytosis.    #Thrombocytosis:  Pt with fluctuating plt counts- on admission, plt 300K-400K now with uptrending counts to 800K  This is most likely 2/2 reactive process given pt's recent hospital course including infection, hematoma evacuation, and cardiac arrest  - Please check iron studies, as iron deficiency can cause thrombocytosis, however pt's anemia likely 2/2 Anemia of chronic disease in setting of kidney failure.   - Peripheral smear reviewed- many large platelets seen, which corroborates a reactive process  - Can also check B12, folate levels, and hemolysis labs- LDH, retic count and haptoglobin  - Would not recommend sending Jak2 or BCR-ABL labs at this time- those can be sent in an outpatient setting if plt continues to rise   - Continue to trend CBC with diff daily   - Continue eliquis for anticoagulation   - Will set up outpatient followup for pt on discharge       Case d/w attending Dr. Alejandrina Multani.     Tori Sierra MD  Hematology Oncology Fellow, PGY-4  [facility] Pager: [phone]/ [facility] Pager: [phone]

## 2020-01-26 NOTE — PROGRESS NOTE ADULT - SUBJECTIVE AND OBJECTIVE BOX
[facility] DIVISION OF KIDNEY DISEASES AND HYPERTENSION -- FOLLOW UP NOTE  --------------------------------------------------------------------------------  Chief Complaint: Fever     24 hour events/subjective:  Patient was seen and examined at bedside  Underwent IHD yesterday, tolerated with no issues  No overnight events      PAST HISTORY  --------------------------------------------------------------------------------  No significant changes to PMH, PSH, FHx, SHx, unless otherwise noted    ALLERGIES & MEDICATIONS  --------------------------------------------------------------------------------  Allergies    coconut (Anaphylaxis)  morphine (Pruritus; Rash)    Intolerances      Standing Inpatient Medications  allopurinol 100 milliGRAM(s) Oral daily  apixaban 5 milliGRAM(s) Oral two times a day  aspirin  chewable 81 milliGRAM(s) Oral daily  brimonidine 0.2% Ophthalmic Solution 1 Drop(s) Both EYES three times a day  chlorhexidine 2% Cloths 1 Application(s) Topical <User Schedule>  epoetin trey Injectable 13936 Unit(s) IV Push <User Schedule>  gabapentin 100 milliGRAM(s) Oral every 12 hours  latanoprost 0.005% Ophthalmic Solution 1 Drop(s) Both EYES daily  meropenem  IVPB 500 milliGRAM(s) IV Intermittent every 12 hours  micafungin IVPB 100 milliGRAM(s) IV Intermittent every 24 hours  Nephro-candace 1 Tablet(s) Oral daily    PRN Inpatient Medications  acetaminophen   Tablet .. 975 milliGRAM(s) Oral every 6 hours PRN  oxyCODONE    IR 10 milliGRAM(s) Oral every 6 hours PRN  oxyCODONE    IR 5 milliGRAM(s) Oral every 4 hours PRN      REVIEW OF SYSTEMS  --------------------------------------------------------------------------------  Gen: No fatigue, fevers/chills, weakness  Skin: No rashes  Head/Eyes/Ears/Mouth: No headache;No sore throat  Respiratory: No dyspnea, cough,   CV: No chest pain, PND, orthopnea  GI: No abdominal pain, diarrhea, constipation, nausea, vomiting  Transplant: No pain  : No increased frequency, dysuria, hematuria, nocturia  MSK: No joint pain/swelling; no back pain; no edema  Neuro: No dizziness/lightheadedness, weakness, seizures, numbness, tingling  Psych: No significant nervousness, anxiety, stress, depression    All other systems were reviewed and are negative, except as noted.    VITALS/PHYSICAL EXAM  --------------------------------------------------------------------------------  T(C): 37.6 (01-26-20 @ 05:08), Max: 38.1 (01-25-20 @ 17:44)  HR: 70 (01-26-20 @ 05:08) (69 - 80)  BP: 99/61 (01-26-20 @ 05:08) (94/59 - 140/94)  RR: 18 (01-26-20 @ 05:08) (13 - 22)  SpO2: 98% (01-26-20 @ 05:08) (94% - 100%)  Wt(kg): --        01-25-20 @ 07:01  -  01-26-20 @ 07:00  --------------------------------------------------------  IN: 1320 mL / OUT: 3025 mL / NET: -1705 mL      Physical Exam:  	Gen: NAD  	HEENT: PERRL, supple neck, clear oropharynx  	Pulm: CTA B/L  	CV: RRR, S1S2; no rub  	Back: No spinal or CVA tenderness; no sacral edema  	Abd: +BS, soft, nontender/nondistended                      Transplant: No tenderness, swelling  	: No suprapubic tenderness  	UE: Warm, FROM; no edema; no asterixis  	LE: Warm, FROM; no edema  	Neuro: No focal deficits  	Psych: Normal affect and mood  	Skin: Warm, without rashes      LABS/STUDIES  --------------------------------------------------------------------------------              7.2    14.69 >-----------<  822      [01-26-20 @ 06:07]              23.0     135  |  97  |  12  ----------------------------<  91      [01-26-20 @ 06:07]  4.0   |  26  |  4.16        Ca     8.6     [01-26-20 @ 06:07]      Mg     1.9     [01-26-20 @ 06:07]      Phos  1.9     [01-26-20 @ 06:07]    TPro  5.7  /  Alb  1.7  /  TBili  0.7  /  DBili  0.4  /  AST  22  /  ALT  <5  /  AlkPhos  285  [01-26-20 @ 06:07]    PT/INR: PT 21.1 , INR 1.82       [01-26-20 @ 06:07]  PTT: 40.0       [01-26-20 @ 06:07]      Creatinine Trend:  SCr 4.16 [01-26 @ 06:07]  SCr 5.90 [01-25 @ 06:13]  SCr 4.59 [01-24 @ 01:39]  SCr 4.21 [01-23 @ 21:42]  SCr 6.40 [01-23 @ 01:14]          BK Virus DNA by PCR:   [facility] Assay Range  : 39 copies/mL to 1.00E+10 copies/mL  The limit of quantitation (LOQ) is 39 copies/mL. BK virus DNA  detected below the LOQ will be reported as Detected:<39 copies/mL.  This test was developed and its performance characteristics  determined by [email or web address]. It has not been cleared or approved  by the U.S. Food and Drug Administration. Results should be used in  conjunction with clinical findings, and should not form the sole  basis for a diagnosis or treatment decision.  ____________________________________________________________  Performed at:  [email or web address]  [address] [place] [address]  Laura Pettit PhD HCLD(ABB)  CLIA# [id number] (01-14 @ 11:42)      Urinalysis - [12-29-19 @ 03:40]      Color Dark Brown / Appearance Turbid / SG 1.021 / pH 7.5      Gluc Negative / Ketone Trace  / Bili Small / Urobili <2 mg/dL       Blood Large / Protein 100 mg/dL / Leuk Est Large / Nitrite Negative      RBC >720 / WBC >720 / Hyaline 5 / Gran  / Sq Epi  / Non Sq Epi 8 / Bacteria Negative      HbA1c 5.2      [01-13-20 @ 22:59]    HBsAb 351.0      [01-11-20 @ 16:09]  HBsAg Nonreact      [01-11-20 @ 16:09]  HBcAb Nonreact      [01-11-20 @ 16:09]  HCV 0.09, Nonreact      [01-11-20 @ 16:09] [facility] DIVISION OF KIDNEY DISEASES AND HYPERTENSION -- FOLLOW UP NOTE  --------------------------------------------------------------------------------  Chief Complaint: Fever     24 hour events/subjective:  Patient was seen and examined at bedside  Underwent IHD yesterday, tolerated with no issues  No overnight events      PAST HISTORY  --------------------------------------------------------------------------------  No significant changes to PMH, PSH, FHx, SHx, unless otherwise noted    ALLERGIES & MEDICATIONS  --------------------------------------------------------------------------------  Allergies    coconut (Anaphylaxis)  morphine (Pruritus; Rash)    Intolerances      Standing Inpatient Medications  allopurinol 100 milliGRAM(s) Oral daily  apixaban 5 milliGRAM(s) Oral two times a day  aspirin  chewable 81 milliGRAM(s) Oral daily  brimonidine 0.2% Ophthalmic Solution 1 Drop(s) Both EYES three times a day  chlorhexidine 2% Cloths 1 Application(s) Topical <User Schedule>  epoetin trey Injectable 90290 Unit(s) IV Push <User Schedule>  gabapentin 100 milliGRAM(s) Oral every 12 hours  latanoprost 0.005% Ophthalmic Solution 1 Drop(s) Both EYES daily  meropenem  IVPB 500 milliGRAM(s) IV Intermittent every 12 hours  micafungin IVPB 100 milliGRAM(s) IV Intermittent every 24 hours  Nephro-candace 1 Tablet(s) Oral daily    PRN Inpatient Medications  acetaminophen   Tablet .. 975 milliGRAM(s) Oral every 6 hours PRN  oxyCODONE    IR 10 milliGRAM(s) Oral every 6 hours PRN  oxyCODONE    IR 5 milliGRAM(s) Oral every 4 hours PRN      REVIEW OF SYSTEMS  --------------------------------------------------------------------------------  Gen: No fatigue, fevers/chills, weakness  Skin: No rashes  Head/Eyes/Ears/Mouth: No headache;No sore throat  Respiratory: No dyspnea, cough,   CV: No chest pain, PND, orthopnea  GI: No abdominal pain, diarrhea, constipation, nausea, vomiting  Transplant: No pain  : No increased frequency, dysuria, hematuria, nocturia  MSK: No joint pain/swelling; no back pain; no edema  Neuro: No dizziness/lightheadedness, weakness, seizures, numbness, tingling  Psych: No significant nervousness, anxiety, stress, depression    All other systems were reviewed and are negative, except as noted.    VITALS/PHYSICAL EXAM  --------------------------------------------------------------------------------  T(C): 37.6 (01-26-20 @ 05:08), Max: 38.1 (01-25-20 @ 17:44)  HR: 70 (01-26-20 @ 05:08) (69 - 80)  BP: 99/61 (01-26-20 @ 05:08) (94/59 - 140/94)  RR: 18 (01-26-20 @ 05:08) (13 - 22)  SpO2: 98% (01-26-20 @ 05:08) (94% - 100%)  Wt(kg): --        01-25-20 @ 07:01  -  01-26-20 @ 07:00  --------------------------------------------------------  IN: 1320 mL / OUT: 3025 mL / NET: -1705 mL      Physical Exam:  	Gen: NAD  	HEENT: PERRL, supple neck, clear oropharynx  	Pulm: CTA B/L  	CV: RRR, S1S2; no rub  	Back: No spinal or CVA tenderness; no sacral edema  	Abd: +BS, soft, nontender/nondistended  	: No suprapubic tenderness  	UE: Warm,  2-3+edema in RUE   	LE: Warm, 3+ edema in b/l LE  R>L   	Neuro: No focal deficits  	Psych: Normal affect and mood  	Skin: Warm, without rashes      LABS/STUDIES  --------------------------------------------------------------------------------              7.2    14.69 >-----------<  822      [01-26-20 @ 06:07]              23.0     135  |  97  |  12  ----------------------------<  91      [01-26-20 @ 06:07]  4.0   |  26  |  4.16        Ca     8.6     [01-26-20 @ 06:07]      Mg     1.9     [01-26-20 @ 06:07]      Phos  1.9     [01-26-20 @ 06:07]    TPro  5.7  /  Alb  1.7  /  TBili  0.7  /  DBili  0.4  /  AST  22  /  ALT  <5  /  AlkPhos  285  [01-26-20 @ 06:07]    PT/INR: PT 21.1 , INR 1.82       [01-26-20 @ 06:07]  PTT: 40.0       [01-26-20 @ 06:07]      Creatinine Trend:  SCr 4.16 [01-26 @ 06:07]  SCr 5.90 [01-25 @ 06:13]  SCr 4.59 [01-24 @ 01:39]  SCr 4.21 [01-23 @ 21:42]  SCr 6.40 [01-23 @ 01:14]          BK Virus DNA by PCR:   [facility] Assay Range  : 39 copies/mL to 1.00E+10 copies/mL  The limit of quantitation (LOQ) is 39 copies/mL. BK virus DNA  detected below the LOQ will be reported as Detected:<39 copies/mL.  This test was developed and its performance characteristics  determined by [facility]. It has not been cleared or approved  by the U.S. Food and Drug Administration. Results should be used in  conjunction with clinical findings, and should not form the sole  basis for a diagnosis or treatment decision.  ____________________________________________________________  Performed at:  [facility]  [address] [place] [address]  Laura Pettit PhD HCLD(ABB)  CLIA# [id number] (01-14 @ 11:42)      Urinalysis - [12-29-19 @ 03:40]      Color Dark Brown / Appearance Turbid / SG 1.021 / pH 7.5      Gluc Negative / Ketone Trace  / Bili Small / Urobili <2 mg/dL       Blood Large / Protein 100 mg/dL / Leuk Est Large / Nitrite Negative      RBC >720 / WBC >720 / Hyaline 5 / Gran  / Sq Epi  / Non Sq Epi 8 / Bacteria Negative      HbA1c 5.2      [01-13-20 @ 22:59]    HBsAb 351.0      [01-11-20 @ 16:09]  HBsAg Nonreact      [01-11-20 @ 16:09]  HBcAb Nonreact      [01-11-20 @ 16:09]  HCV 0.09, Nonreact      [01-11-20 @ 16:09]

## 2020-01-26 NOTE — PROGRESS NOTE ADULT - ATTENDING COMMENTS
working on improving mobility.  PLT still increasing.  Will call heme.   ID following, procal continues to decrease  on therapeutic anticoagulation

## 2020-01-26 NOTE — PROVIDER CONTACT NOTE (CRITICAL VALUE NOTIFICATION) - BACKGROUND
pt S/P transplant nephrectomy w/ repair of the right external iliac artery w/ a bovine patch R/T infected transplant perinephric hematoma

## 2020-01-26 NOTE — PROGRESS NOTE ADULT - PROBLEM SELECTOR PLAN 1
Last HD treatment on 1/25/20. Follows with nephrologist, Dr Amaya. He may benefit from daily IHD/IUF sessions for more fluid removal. will discuss.

## 2020-01-27 LAB
ALBUMIN SERPL ELPH-MCNC: 2 G/DL — LOW (ref 3.3–5)
ALP SERPL-CCNC: 292 U/L — HIGH (ref 40–120)
ALT FLD-CCNC: <5 U/L — LOW (ref 10–45)
ANION GAP SERPL CALC-SCNC: 15 MMOL/L — SIGNIFICANT CHANGE UP (ref 5–17)
APTT BLD: 45.5 SEC — HIGH (ref 27.5–36.3)
AST SERPL-CCNC: 25 U/L — SIGNIFICANT CHANGE UP (ref 10–40)
BASOPHILS # BLD AUTO: 0 K/UL — SIGNIFICANT CHANGE UP (ref 0–0.2)
BILIRUB DIRECT SERPL-MCNC: 0.4 MG/DL — HIGH (ref 0–0.2)
BILIRUB INDIRECT FLD-MCNC: 0.3 MG/DL — SIGNIFICANT CHANGE UP (ref 0.2–1)
BILIRUB SERPL-MCNC: 0.7 MG/DL — SIGNIFICANT CHANGE UP (ref 0.2–1.2)
BUN SERPL-MCNC: 18 MG/DL — SIGNIFICANT CHANGE UP (ref 7–23)
CALCIUM SERPL-MCNC: 8.7 MG/DL — SIGNIFICANT CHANGE UP (ref 8.4–10.5)
CHLORIDE SERPL-SCNC: 95 MMOL/L — LOW (ref 96–108)
CMV DNA CSF QL NAA+PROBE: SIGNIFICANT CHANGE UP
CMV DNA SPEC NAA+PROBE-LOG#: SIGNIFICANT CHANGE UP LOG10IU/ML
CO2 SERPL-SCNC: 25 MMOL/L — SIGNIFICANT CHANGE UP (ref 22–31)
CREAT SERPL-MCNC: 5.42 MG/DL — HIGH (ref 0.5–1.3)
CULTURE RESULTS: SIGNIFICANT CHANGE UP
EOSINOPHIL # BLD AUTO: 0.2 K/UL — SIGNIFICANT CHANGE UP (ref 0–0.5)
EOSINOPHIL NFR BLD AUTO: 1 % — SIGNIFICANT CHANGE UP (ref 0–6)
FOLATE SERPL-MCNC: 17.2 NG/ML — SIGNIFICANT CHANGE UP
GLUCOSE SERPL-MCNC: 94 MG/DL — SIGNIFICANT CHANGE UP (ref 70–99)
HAPTOGLOB SERPL-MCNC: 181 MG/DL — SIGNIFICANT CHANGE UP (ref 34–200)
HCT VFR BLD CALC: 24.5 % — LOW (ref 39–50)
HGB BLD-MCNC: 7.5 G/DL — LOW (ref 13–17)
INR BLD: 2.13 RATIO — HIGH (ref 0.88–1.16)
IRON SATN MFR SERPL: 12 % — LOW (ref 16–55)
IRON SATN MFR SERPL: 23 UG/DL — LOW (ref 45–165)
LDH SERPL L TO P-CCNC: 273 U/L — HIGH (ref 50–242)
LYMPHOCYTES # BLD AUTO: 1.12 K/UL — SIGNIFICANT CHANGE UP (ref 1–3.3)
LYMPHOCYTES # BLD AUTO: 7 % — LOW (ref 13–44)
MAGNESIUM SERPL-MCNC: 2 MG/DL — SIGNIFICANT CHANGE UP (ref 1.6–2.6)
MCHC RBC-ENTMCNC: 29.2 PG — SIGNIFICANT CHANGE UP (ref 27–34)
MCHC RBC-ENTMCNC: 30.6 GM/DL — LOW (ref 32–36)
MCV RBC AUTO: 95.3 FL — SIGNIFICANT CHANGE UP (ref 80–100)
MONOCYTES # BLD AUTO: 2.25 K/UL — HIGH (ref 0–0.9)
MONOCYTES NFR BLD AUTO: 15 % — HIGH (ref 2–14)
NEUTROPHILS # BLD AUTO: 12.6 K/UL — HIGH (ref 1.8–7.4)
NEUTROPHILS NFR BLD AUTO: 76 % — SIGNIFICANT CHANGE UP (ref 43–77)
PHOSPHATE SERPL-MCNC: 2.8 MG/DL — SIGNIFICANT CHANGE UP (ref 2.5–4.5)
PLATELET # BLD AUTO: 853 K/UL — HIGH (ref 150–400)
POTASSIUM SERPL-MCNC: 4.5 MMOL/L — SIGNIFICANT CHANGE UP (ref 3.5–5.3)
POTASSIUM SERPL-SCNC: 4.5 MMOL/L — SIGNIFICANT CHANGE UP (ref 3.5–5.3)
PROCALCITONIN SERPL-MCNC: 1.31 NG/ML — HIGH (ref 0.02–0.1)
PROT SERPL-MCNC: 5.8 G/DL — LOW (ref 6–8.3)
PROTHROM AB SERPL-ACNC: 24.9 SEC — HIGH (ref 10–12.9)
RBC # BLD: 2.57 M/UL — LOW (ref 4.2–5.8)
RBC # BLD: 2.57 M/UL — LOW (ref 4.2–5.8)
RBC # FLD: 17.1 % — HIGH (ref 10.3–14.5)
RETICS #: 0.08 K/UL — SIGNIFICANT CHANGE UP (ref 25–125)
RETICS/RBC NFR: 3.01 % — SIGNIFICANT CHANGE UP (ref 0.5–2.5)
SODIUM SERPL-SCNC: 135 MMOL/L — SIGNIFICANT CHANGE UP (ref 135–145)
SPECIMEN SOURCE: SIGNIFICANT CHANGE UP
TIBC SERPL-MCNC: 189 UG/DL — LOW (ref 220–430)
UIBC SERPL-MCNC: 166 UG/DL — SIGNIFICANT CHANGE UP (ref 110–370)
VIT B12 SERPL-MCNC: 968 PG/ML — SIGNIFICANT CHANGE UP (ref 232–1245)
WBC # BLD: 15.72 K/UL — HIGH (ref 3.8–10.5)
WBC # FLD AUTO: 15.72 K/UL — HIGH (ref 3.8–10.5)

## 2020-01-27 PROCEDURE — 99232 SBSQ HOSP IP/OBS MODERATE 35: CPT

## 2020-01-27 PROCEDURE — 93010 ELECTROCARDIOGRAM REPORT: CPT

## 2020-01-27 PROCEDURE — 99232 SBSQ HOSP IP/OBS MODERATE 35: CPT | Mod: GC

## 2020-01-27 PROCEDURE — 99233 SBSQ HOSP IP/OBS HIGH 50: CPT | Mod: GC

## 2020-01-27 RX ADMIN — OXYCODONE HYDROCHLORIDE 5 MILLIGRAM(S): 5 TABLET ORAL at 00:30

## 2020-01-27 RX ADMIN — BRIMONIDINE TARTRATE 1 DROP(S): 2 SOLUTION/ DROPS OPHTHALMIC at 15:17

## 2020-01-27 RX ADMIN — GABAPENTIN 100 MILLIGRAM(S): 400 CAPSULE ORAL at 06:16

## 2020-01-27 RX ADMIN — BRIMONIDINE TARTRATE 1 DROP(S): 2 SOLUTION/ DROPS OPHTHALMIC at 22:10

## 2020-01-27 RX ADMIN — Medication 81 MILLIGRAM(S): at 11:59

## 2020-01-27 RX ADMIN — MICAFUNGIN SODIUM 105 MILLIGRAM(S): 100 INJECTION, POWDER, LYOPHILIZED, FOR SOLUTION INTRAVENOUS at 18:20

## 2020-01-27 RX ADMIN — AMIODARONE HYDROCHLORIDE 200 MILLIGRAM(S): 400 TABLET ORAL at 06:16

## 2020-01-27 RX ADMIN — APIXABAN 5 MILLIGRAM(S): 2.5 TABLET, FILM COATED ORAL at 18:26

## 2020-01-27 RX ADMIN — OXYCODONE HYDROCHLORIDE 10 MILLIGRAM(S): 5 TABLET ORAL at 06:47

## 2020-01-27 RX ADMIN — OXYCODONE HYDROCHLORIDE 10 MILLIGRAM(S): 5 TABLET ORAL at 21:11

## 2020-01-27 RX ADMIN — LATANOPROST 1 DROP(S): 0.05 SOLUTION/ DROPS OPHTHALMIC; TOPICAL at 11:58

## 2020-01-27 RX ADMIN — APIXABAN 5 MILLIGRAM(S): 2.5 TABLET, FILM COATED ORAL at 06:16

## 2020-01-27 RX ADMIN — OXYCODONE HYDROCHLORIDE 10 MILLIGRAM(S): 5 TABLET ORAL at 06:17

## 2020-01-27 RX ADMIN — CHLORHEXIDINE GLUCONATE 1 APPLICATION(S): 213 SOLUTION TOPICAL at 06:21

## 2020-01-27 RX ADMIN — OXYCODONE HYDROCHLORIDE 10 MILLIGRAM(S): 5 TABLET ORAL at 20:41

## 2020-01-27 RX ADMIN — MEROPENEM 100 MILLIGRAM(S): 1 INJECTION INTRAVENOUS at 06:16

## 2020-01-27 RX ADMIN — OXYCODONE HYDROCHLORIDE 10 MILLIGRAM(S): 5 TABLET ORAL at 12:40

## 2020-01-27 RX ADMIN — GABAPENTIN 100 MILLIGRAM(S): 400 CAPSULE ORAL at 18:27

## 2020-01-27 RX ADMIN — OXYCODONE HYDROCHLORIDE 10 MILLIGRAM(S): 5 TABLET ORAL at 11:58

## 2020-01-27 RX ADMIN — Medication 100 MILLIGRAM(S): at 11:59

## 2020-01-27 RX ADMIN — BRIMONIDINE TARTRATE 1 DROP(S): 2 SOLUTION/ DROPS OPHTHALMIC at 06:16

## 2020-01-27 RX ADMIN — MEROPENEM 100 MILLIGRAM(S): 1 INJECTION INTRAVENOUS at 20:18

## 2020-01-27 RX ADMIN — Medication 1 TABLET(S): at 11:59

## 2020-01-27 NOTE — PROGRESS NOTE ADULT - ASSESSMENT
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 RVP with Influenza A s/p Tamiflu treatment course. Now with infected carlos-transplant hematoma.    1/1: s/p Ex-Lap and washout of infected hematoma (Enterobacter from cultures)  1/6: s/p IR guided drainage attempt of hematoma on 1/6 (NGTD from cultures)  1/10: s/p transplant nephrectomy, repair of right external iliac artery with a bovine pericardial patch, repair of right external iliac vein and thrombectomy of right lower extremity veins (Surgical cultures with Enterobacter)  1/15: s/p IR guided drainage (220cc of hematoma)  1/22: s/p IR drain repositioning and upsizing (Gram stain with GNR but so far NGTD)    PEA arrest and Torsades on 1/13  CT C/A/P (1/13) with Saddle PE, interval increase in size of RP fluid collection (infected hematoma)  CT C/A/P (1/21) with pulmonary infarcts and residual hematoma (decreased in size)    Some of the Enterobacter isolates R and S to Ertapenem  per core lab Ertapenem at borderline of Susceptible and Resistant breakpoint  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   Given concern for intravascular infection I would treat for 6 weeks from 1/10 nephrectomy (End Date: 2/20/20)  I would continue Meropenem while admitted and switch to Ciprofloxacin on discharge    Fevers resolved, leukocytosis stable and procalcitonin decreasing.   Fevers & leukocytosis likely secondary to either pulmonary infarcts or from residual hematoma/collection.   Fluid sampling from 1/22 drain repositioning / upsize with GNR on gram stain.   So far no growth to date. Anticipate Enterobacter if there is growth    Overall, Leukocytosis (stable), Fever (resolving), Infected Hematoma, Saddle pulmonary embolus, Positive Urine Culture, Influenza Infection, Renal Transplant Recipient    --Continue Meropenem 500 mg IV Q24H (anticipate switch to Cipro PO on discharge - End Date: 12/20/20)  --Would consider discontinuing Micafungin - no evidence of yeast in prior cultures.   --Continue to follow CBC with diff  --Continue to follow temperature curve  --Follow up on preliminary blood cultures   --Follow up on preliminary 1/22 and 1/24 IR cultures   --Management of Saddle PE per Transplant and vascular teams    I will continue to follow. Please feel free to contact me with any further questions.    Dada Jacobo M.D.  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 RVP with Influenza A s/p Tamiflu treatment course. Now with infected carlos-transplant hematoma.    1/1: s/p Ex-Lap and washout of infected hematoma (Enterobacter from cultures)  1/6: s/p IR guided drainage attempt of hematoma on 1/6 (NGTD from cultures)  1/10: s/p transplant nephrectomy, repair of right external iliac artery with a bovine pericardial patch, repair of right external iliac vein and thrombectomy of right lower extremity veins (Surgical cultures with Enterobacter)  1/15: s/p IR guided drainage (220cc of hematoma)  1/22: s/p IR drain repositioning and upsizing (Gram stain with GNR but so far NGTD)    PEA arrest and Torsades on 1/13  CT C/A/P (1/13) with Saddle PE, interval increase in size of RP fluid collection (infected hematoma)  CT C/A/P (1/21) with pulmonary infarcts and residual hematoma (decreased in size)    Some of the Enterobacter isolates R and S to Ertapenem  per core lab Ertapenem at borderline of Susceptible and Resistant breakpoint  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   Given concern for intravascular infection I would treat for 6 weeks from 1/10 nephrectomy (End Date: 2/20/20)  I would continue Meropenem while admitted and switch to Ciprofloxacin on discharge    Fevers resolved, leukocytosis stable and procalcitonin decreasing.   Fevers & leukocytosis likely secondary to either pulmonary infarcts or from residual hematoma/collection.   Fluid sampling from 1/22 drain repositioning / upsize with GNR on gram stain.   So far no growth to date. Anticipate Enterobacter if there is growth    Overall, Leukocytosis (stable), Fever (resolving), Infected Hematoma, Saddle pulmonary embolus, Positive Urine Culture, Renal Transplant Recipient    --Continue Meropenem 500 mg IV Q24H (anticipate switch to Cipro PO on discharge - End Date: 12/20/20)  --Would consider discontinuing Micafungin - no evidence of yeast in prior cultures.   --Continue to follow CBC with diff  --Continue to follow temperature curve  --Follow up on preliminary blood cultures   --Follow up on preliminary 1/22 and 1/24 IR cultures   --Management of Saddle PE per Transplant and vascular teams    I will continue to follow. Please feel free to contact me with any further questions.    Dada Jacobo M.D.  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 RVP with Influenza A s/p Tamiflu treatment course. Now with infected carlos-transplant hematoma.    1/1: s/p Ex-Lap and washout of infected hematoma (Enterobacter from cultures)  1/6: s/p IR guided drainage attempt of hematoma on 1/6 (NGTD from cultures)  1/10: s/p transplant nephrectomy, repair of right external iliac artery with a bovine pericardial patch, repair of right external iliac vein and thrombectomy of right lower extremity veins (Surgical cultures with Enterobacter)  1/15: s/p IR guided drainage (220cc of hematoma)  1/22: s/p IR drain repositioning and upsizing (Gram stain with GNR but so far NGTD)    PEA arrest and Torsades on 1/13  CT C/A/P (1/13) with Saddle PE, interval increase in size of RP fluid collection (infected hematoma)  CT C/A/P (1/21) with pulmonary infarcts and residual hematoma (decreased in size)    Some of the Enterobacter isolates R and S to Ertapenem  per core lab Ertapenem at borderline of Susceptible and Resistant breakpoint  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   Given concern for intravascular infection I would treat for 6 weeks from 1/10 nephrectomy (End Date: 2/20/20)  I would continue Meropenem while admitted and switch to Ciprofloxacin on discharge    Fevers resolved, leukocytosis stable and procalcitonin decreasing.   Fevers & leukocytosis likely secondary to either pulmonary infarcts or from residual hematoma/collection.   Fluid sampling from 1/22 drain repositioning / upsize with GNR on gram stain.   So far no growth to date. Anticipate Enterobacter if there is growth    Overall, Leukocytosis (stable), Fever (resolving), Infected Hematoma, Saddle pulmonary embolus, Positive Urine Culture, Renal Transplant Recipient    --Continue Meropenem 500 mg IV Q24H (anticipate switch to Cipro PO on discharge - End Date: 2/20/20)  --Would consider discontinuing Micafungin - no evidence of yeast in prior cultures.   --Continue to follow CBC with diff  --Continue to follow temperature curve  --Follow up on preliminary blood cultures   --Follow up on preliminary 1/22 and 1/24 IR cultures   --Management of Saddle PE per Transplant and vascular teams    I will continue to follow. Please feel free to contact me with any further questions.    Dada Jacobo M.D.  [facility] Division of Infectious Disease  8AM-5PM: Pager Number [phone]  After Hours (or if no response): Please contact the Infectious Diseases Office at [phone]

## 2020-01-27 NOTE — PROGRESS NOTE ADULT - PROBLEM SELECTOR PLAN 1
Last HD treatment on 1/25/20. Follows with nephrologist, Dr Amaya. He may benefit from daily IHD/IUF sessions for more fluid removal.

## 2020-01-27 NOTE — PROGRESS NOTE ADULT - SUBJECTIVE AND OBJECTIVE BOX
Transplant Surgery - Multidisciplinary Rounds  --------------------------------------------------------------  R DDRT    Date:  12/8/19     Present:   Patient seen and examined with Dr. Be, Dr. Joy, Dr. Velásquez, nephrologist Dr. SHARLENE Puckett, renal fellow, NP: Stefan Hunt, unit RN manager during am rounds. Disciplines not in attendance will be notified of the plan.     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 Young, followed by Dr. Raphael, [facility] Oncologist), RA with hand contractures. He underwent DDRT on 12/8/19. Post op course c/b DGF requiring HD, thrombocytopenia, elevated LDH and haptoglobin. Schistocytes  on peripheral smear concerning for TMA. Envarsus held. Received Belatacept 12/13. Started on PLEX (12/12, 12/15). Underwent renal bx on 12/13 c/w profound ATN.  Discharged home on 12/20/19 w/ outpatient HD.     Re-admitted 12/27 with influenza, completed treatement with Tamiflu on 12/31. Urine cx on admission grew Ent cloacae, was started on meropenem 12/31. Renal US on admission with increasing hematoma.   ·	OR 1/1 for ex-lap, hematoma evacuation, washout and renal bx. (c/w ATN)  OR cxs grew CRE and e coli. Post op course c/b:   ·	fevers and leukocytosis, CT a/p (1/4) with increased perinephric collection. Underwent IR guided collection 1/6   ·	New onset hematuria, martinez inserted 1/7, started CBI  ·	RLE edema, RLE doppler (1/8) with acute above the knee thrombus of R external iliac, common femoral and femoral veins, Acute calf vein thrombus affecting R soleal vein, Heparin gtt initiated   ·	AMS 1/9  (head CT neg), hyponatremic  ·	Significant hematuria and bleeding around the martinez. Angio 1/9 showed actively bleeding pseudoaneurysm at the anastomosis of the transplant renal artery with the recipient iliac artery. A stent was placed in the iliac artery to  control hemorrhage. IVC filter placed.   ·	Emergent OR 1/9-1/10 for graft nephrectomy;  ureter to bladder anastomosis was completely disrupted, repaired the R external iliac artery with bovine pericardial patch, repaired right external iliac vein, performed thrombectomy of RLE veins, Intra op received 3u PRBC.   ·	Extubated 1/11  ·	1/13 Saddle PE, with right heart strain and complicated by PEA arrest followed by Torsade, ROSC achieved after ACLS; re-intubated  ·	1/15 Renal US with 13.7 x 5.0 x 1.5 cm in RLQ transplant bed; s/p bedside IR guided drainage, pigtail in place. Fluid cx to date with NG  ·	1/21 CT C/A/P showing persistent collection, catheter at superior portion of collection  ·	1/22 IR pigtail drain repositioned and upsized to 12Fr catheter w/ increased output  ·	1/23 Transitioned to Eliquis from Heparin drip    Interval events:    - Continued on Meropenem and Micafugin  - continued LE edema and RUE edema, slow improvement  - on Eliquis with no signs of bleeding. BO ss.  - anuric, no issues after martinez removal  - Tolerating HD as scheduled, TTS  - no new complaints at this time    cxs:   12/29: Urine Cx: Enterobacter Cloacae  1/1 OR Perinephric collection Cx:  Carbapenem resistant Ent Cloacae, E. Coli  1/1: R kidney abscess swab: Enterobacter Cloacae  1/1  OR tissue: Enterobacter Cloacae  1/5: Urine Cx: Enteropbacter Cloacae  1/5: Skin incision Cx (bedside): NG  1/6: Adominal fluid Cx from IR:  NG   1/7: Urine Cx:  NG  1/7: BCX2:  NG  1/10 retroperit hematoma - enerobacter   1/10 bladder hematoma - enterobacter  1/15 Drainage fluid - NGTD  1/16: BC X2: NGTD  1/18: SPutum: Normal harvey  1/21: BC X2: NTGD  1/22: IR body fluid Cx: GNR pre-milian    Potential Discharge date: pending clinical improvement         MEDICATIONS  (STANDING):  allopurinol 100 milliGRAM(s) Oral daily  aMIOdarone    Tablet 200 milliGRAM(s) Oral daily  apixaban 5 milliGRAM(s) Oral two times a day  aspirin  chewable 81 milliGRAM(s) Oral daily  brimonidine 0.2% Ophthalmic Solution 1 Drop(s) Both EYES three times a day  chlorhexidine 2% Cloths 1 Application(s) Topical <User Schedule>  epoetin trey Injectable 18965 Unit(s) IV Push <User Schedule>  gabapentin 100 milliGRAM(s) Oral every 12 hours  latanoprost 0.005% Ophthalmic Solution 1 Drop(s) Both EYES daily  meropenem  IVPB 500 milliGRAM(s) IV Intermittent every 12 hours  micafungin IVPB 100 milliGRAM(s) IV Intermittent every 24 hours  Nephro-candace 1 Tablet(s) Oral daily    MEDICATIONS  (PRN):  acetaminophen   Tablet .. 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  Carpal tunnel syndrome  Brain cyst: had MRI recently- now gone  Anemia  Gastric ulcer: Long time ago  Rheumatoid arthritis  Renal transplant recipient  Breakdown (mechanical) of penile (implanted) prosthesis, sequela  End-stage renal disease (ESRD): PERMACATH PLACEMENT  Abscess of left buttock: s/p I &amp; D  AV fistula: placement left lower arm  S/P cystoscopy: stent placement &amp; removal for kidney stones, lithiotripsy  s/p Lt Carpal tunnel: 2011      Vital Signs Last 24 Hrs  T(C): 37 (27 Jan 2020 05:15), Max: 37.7 (26 Jan 2020 09:00)  T(F): 98.6 (27 Jan 2020 05:15), Max: 99.8 (26 Jan 2020 09:00)  HR: 72 (27 Jan 2020 05:15) (68 - 72)  BP: 136/78 (27 Jan 2020 05:15) (101/62 - 136/78)  BP(mean): --  RR: 17 (27 Jan 2020 05:15) (17 - 18)  SpO2: 100% (27 Jan 2020 05:15) (94% - 100%)    I&O's Summary    26 Jan 2020 07:01  -  27 Jan 2020 07:00  --------------------------------------------------------  IN: 980 mL / OUT: 115 mL / NET: 865 mL                              7.5    15.72 )-----------( 853      ( 27 Jan 2020 06:42 )             24.5     01-27    135  |  95<L>  |  18  ----------------------------<  94  4.5   |  25  |  5.42<H>    Ca    8.7      27 Jan 2020 06:39  Phos  2.8     01-27  Mg     2.0     01-27    TPro  5.8<L>  /  Alb  2.0<L>  /  TBili  0.7  /  DBili  0.4<H>  /  AST  25  /  ALT  x   /  AlkPhos  292<H>  01-27          Culture - Blood (collected 01-24-20 @ 05:14)  Source: .Blood Blood  Preliminary Report (01-25-20 @ 06:01):    No growth to date.    Culture - Fungal, Body Fluid (collected 01-24-20 @ 02:22)  Source: .Body Fluid RLQ Fluid Collection  Preliminary Report (01-24-20 @ 15:47):    Testing in progress    Culture - Blood (collected 01-24-20 @ 01:59)  Source: .Blood Blood  Preliminary Report (01-25-20 @ 02:03):    No growth to date.    Culture - Body Fluid with Gram Stain (collected 01-22-20 @ 22:15)  Source: .Body Fluid Abdominal Fluid  Gram Stain (01-23-20 @ 03:51):    polymorphonuclear leukocytes seen    Gram Negative Rods seen    by cytocentrifuge  Preliminary Report (01-23-20 @ 21:46):    No growth    Culture - Blood (collected 01-21-20 @ 05:11)  Source: .Blood Blood  Final Report (01-26-20 @ 06:01):    No growth at 5 days.    Culture - Blood (collected 01-21-20 @ 03:02)  Source: .Blood Blood  Final Report (01-26-20 @ 04:00):    No growth at 5 days.    REVIEW OF SYSTEMS  Gen: + weakness   Skin: No rashes  Head/Eyes/Ears/Mouth: No headache; Normal hearing; Normal vision w/o blurriness; No sinus pain/discomfort, sore throat  Respiratory: no SOB  CV: No chest pain, PND, orthopnea  GI: incisional tenderness   : anuric  MSK: + lower extremity edema, RUE edema, flank edema  Neuro: No dizziness/lightheadedness, weakness, seizures, numbness, tingling  Heme: No easy bruising or bleeding  Endo: No heat/cold intolerance  Psych: No significant nervousness, anxiety, stress, depression    PHYSICAL EXAM:    Constitutional: NAD  	Eyes: Anicteric, PERRLA  	Respiratory: CTA b/l  	Cardiovascular: RRR  	Gastrointestinal: Soft abdomen, appropriate incisional TTP, ND.  wound vac ss/murky output (wound seen during recent wound vac change, good granulation tissue developing), RLQ drain with SS drainage    Genitourinary:  anuric    Extremities: b/l 2-3+ lower ext edema ace wrapped, no ulcerations or skin breakdown, flank edema, RUE edema slow improvement   	Vascular: DP signal bilateral, L AVF palpable  	Neurological: A&O x3.  	Skin: no new lesions/ulcerations  	Musculoskeletal: Moving all extremities

## 2020-01-27 NOTE — PROGRESS NOTE ADULT - ATTENDING COMMENTS
appears improved. will have more frequent HD to UF, he remains hypervolemic.   working on PT  appreciate ID input.

## 2020-01-27 NOTE — PROGRESS NOTE ADULT - SUBJECTIVE AND OBJECTIVE BOX
Follow Up:  infected hematoma    Interval History: afebrile. no n/v/d. continued abdominal pain in RLQ which is unchanged. no cough or SOB.     REVIEW OF SYSTEMS  [  ] ROS unobtainable because:    [ x ] All other systems negative except as noted below    Constitutional:  [ ] fever [ ] chills  [ ] weight loss  [ ] weakness  Skin:  [ ] rash [ ] phlebitis	  Eyes: [ ] icterus [ ] pain  [ ] discharge	  ENMT: [ ] sore throat  [ ] thrush [ ] ulcers [ ] exudates  Respiratory: [ ] dyspnea [ ] hemoptysis [ ] cough [ ] sputum	  Cardiovascular:  [ ] chest pain [ ] palpitations [ ] edema	  Gastrointestinal:  [ ] nausea [ ] vomiting [ ] diarrhea [ ] constipation [ x] pain	  Genitourinary:  [ ] dysuria [ ] frequency [ ] hematuria [ ] discharge [ ] flank pain  [ ] incontinence  Musculoskeletal:  [ ] myalgias [ ] arthralgias [ ] arthritis  [ ] back pain  Neurological:  [ ] headache [ ] seizures  [ ] confusion/altered mental status    Allergies  coconut (Anaphylaxis)  morphine (Pruritus; Rash)        ANTIMICROBIALS:  meropenem  IVPB 500 every 12 hours  micafungin IVPB 100 every 24 hours      OTHER MEDS:  MEDICATIONS  (STANDING):  acetaminophen   Tablet .. 975 every 6 hours PRN  allopurinol 100 daily  aMIOdarone    Tablet 200 daily  apixaban 5 two times a day  aspirin  chewable 81 daily  epoetin trey Injectable 21374 <User Schedule>  gabapentin 100 every 12 hours  oxyCODONE    IR 10 every 6 hours PRN  oxyCODONE    IR 5 every 4 hours PRN      Vital Signs Last 24 Hrs  T(C): 37.6 (27 Jan 2020 13:44), Max: 37.6 (27 Jan 2020 13:44)  T(F): 99.6 (27 Jan 2020 13:44), Max: 99.6 (27 Jan 2020 13:44)  HR: 82 (27 Jan 2020 13:44) (68 - 82)  BP: 138/75 (27 Jan 2020 13:44) (101/62 - 138/75)  BP(mean): --  RR: 18 (27 Jan 2020 13:44) (17 - 18)  SpO2: 94% (27 Jan 2020 13:44) (94% - 100%)    PHYSICAL EXAMINATION:  General: Alert and Awake, NAD  HEENT: PERRL, EOMI  Neck: Supple  Cardiac: RRR, No M/R/G  Resp: CTAB, No Wh/Rh/Ra  Abdomen: +RLQ drain with serosanguinous drainage. +RLQ wound vac over transplant nephrectomy site, NBS, mild tenderness to palpation over RLQ  MSK: 1-2+ RLE edema. No Calf tenderness  Skin: No rashes or lesions. Skin is warm and dry to the touch.   Neuro: Alert and Awake. CN 2-12 Grossly intact. Moves all four extremities spontaneously.  Psych: Calm, Pleasant, Cooperative                          7.5    15.72 )-----------( 853      ( 27 Jan 2020 06:42 )             24.5       01-27    135  |  95<L>  |  18  ----------------------------<  94  4.5   |  25  |  5.42<H>    Ca    8.7      27 Jan 2020 06:39  Phos  2.8     01-27  Mg     2.0     01-27    TPro  5.8<L>  /  Alb  2.0<L>  /  TBili  0.7  /  DBili  0.4<H>  /  AST  25  /  ALT  <5<L>  /  AlkPhos  292<H>  01-27          MICROBIOLOGY:  v  .Blood Blood  01-24-20   No growth to date.  --  --      .Body Fluid RLQ Fluid Collection  01-24-20   Testing in progress  --  --      .Blood Blood  01-24-20   No growth to date.  --  --      .Body Fluid Abdominal Fluid  01-22-20   No growth  --    polymorphonuclear leukocytes seen  Gram Negative Rods seen  by cytocentrifuge      .Blood Blood  01-21-20   No growth at 5 days.  --  --      .Blood Blood  01-21-20   No growth at 5 days.  --  --      .Sputum Sputum, trap  01-18-20   Normal Respiratory Gillian present  --    No polymorphonuclear leukocytes per low power field  Rare Squamous epithelial cells per low power field  Rare Gram positive cocci in pairs per oil power field      .Blood Blood  01-16-20   No growth at 5 days.  --  --      .Body Fluid Abdominal Fluid  01-15-20   No growth at 5 days  --    polymorphonuclear leukocytes seen  No organisms seen  by cytocentrifuge      .Tissue Other  01-10-20   No growth at 5 days  --  Enterobacter cloacae (Carbapenem Resistant)      .Blood Blood-Venous  01-07-20   No growth at 5 days.  --  --      .Urine Catheterized  01-07-20   <10,000 CFU/mL Normal Urogenital Gillian  --  --      Abdominal Fl Abdominal Fluid  01-06-20   No growth at 5 days  --    No polymorphonuclear leukocytes seen  No organisms seen  by cytocentrifuge      .Urine Clean Catch (Midstream)  01-05-20   >100,000 CFU/ml Enterobacter cloacae  --  Enterobacter cloacae      .Blood Blood  01-04-20   No growth at 5 days.  --  --      .Tissue Other  01-01-20   Numerous Enterobacter cloacae complex  --  Enterobacter cloacae complex      .Surgical Swab collection around right kidney  01-01-20   Moderate Enterobacter cloacae complex  --  Enterobacter cloacae complex      .Body Fluid periphrenic collection  01-01-20   **Please Note**: This is a Corrected Report**  Moderate Enterobacter cloacae complex  Moderate Escherichia coli  Previously reported as:  Moderate Enterobacter cloacae (Carbapenem Resistant)  Moderate Escherichia coli  --  Escherichia coli  Enterobacter cloacae complex      .Surgical Swab right kidney abscess  01-01-20   Moderate Enterobacter cloacae complex  --  Enterobacter cloacae complex      .Urine Clean Catch (Midstream)  12-29-19   >100,000 CFU/ml Enterobacter cloacae  --  Enterobacter cloacae    Rapid RVP Result: NotDetec (01-23 @ 21:40)    RADIOLOGY:    <The imaging below has been reviewed and visualized me independently>    EXAM:  XR CHEST PORTABLE ROUTINE 1V                        PROCEDURE DATE:  01/25/2020    Clear lungs. No pleural effusion or pneumothorax.  The cardiomediastinal silhouette is normal in size and contour. Old fracture deformity of the left clavicle.

## 2020-01-27 NOTE — PROGRESS NOTE ADULT - ASSESSMENT
49M PMHx of ESRD on HD  ( via LUE AVF), HTN, Gout, Glaucoma, NET of pancreas, RA with hand contractures. s/p DDRT 12/8/19,  Post op course c/b DGF requiring HD. Renal bx x 2 c/w profound ATN.     Re-admitted with Influenza and perinephric hematoma. s/p hematoma evacuation/ abd washout and renal biopsy. Post op course c/b leukocytosis, infected hematoma, acute RLE DVT, and bleeding pseudoaneurysm at txp renal artery anastomosis. Now s/p graft nephrectomy. Course further complicated by saddle PE causing right heart strain and hemodynamic instability leading to cardiac arrest requiring ACLS, Required IR drainage of post-op collections x2. Transferred to floor on 1/25/2020.    [] DDRT c/b DGF/ATN/perinephric infected hematoma/pseudoneurysm/graft nephrectomy   - HD per nephrology team. plan for  additional sessions this week to help with edema  - s/p bedside IR drainage of RLQ fluid collection (1/15), drain repositioned 1/22 and cultures pending result this far negative  - FU final IR culture, fungal culture from 1/22  - ID following;  cont Meropenem, Micafungin 100mg daily.  Will discuss with ID on duration likely need long term antibiotics.  - pain control  - bowel regimen  - PT/OT/OOB.  VAC change per PT  - Keep B/L LE bandaged with ace wrap and skin care      [] Saddle PE, with right heart strain and complicated by PEA arrest followed by Torsade, ROSC achieved after ACLS  - vascular, CT and vascular cardiology following   - CT 1/21: There are pulmonary emboli within the right lower lobar arteries and the left lower lobar arteries extending into the distal segments as seen on prior study. The pulmonary emboli seen in the right and left pulmonary arteries are normal longer visualized. New distal to mid brachial DVT noted  1/19 Duplex: RUE Deep vein thrombosis in the mid to distal brachial vein.  - Continue Eliquis to 5mg PO BID   - continue ASA  - repeat ECHO with improved R heart strain    [] New acute DVT of R distal brachia vein   - continue anticoagulation     [] New acute DVT of R external iliac, common femoral, femoral veins, R soleal vein requiring heparin drip  - IVC filter placed 1/9, continue anticoagulation     [] Thrombocytosis  - rising INR/PLT counts over the past week. Will involve Heme    [] Dispo  - continue tele

## 2020-01-27 NOTE — PROGRESS NOTE ADULT - SUBJECTIVE AND OBJECTIVE BOX
[facility] DIVISION OF KIDNEY DISEASES AND HYPERTENSION -- FOLLOW UP NOTE  --------------------------------------------------------------------------------  HPI: 49 yr old man with ESRD s/p DDRT in Dec 2019 complicated by DGF due to ATN and early TMA thought to be due to prograf. Was on belatacept/MMF/Pred immunosuppression. He was admitted with influenza, course complicated by perinephric hematoma complicated by enterobacter infection s/p surgical exploration and wash out. He developed DVT started on heparin drip, subsequent gross hematuria. He was taken to the OR and found to have dehiscence of the ureter-bladder anastomosis as well as vascular anastomosis. He underwent graft nephrectomy on 1/10/20 with bovine biopatch repair of the EIA. On 1/13/20 he had cardiac arrest due to large pulmonary embolism s/p successful resuscitation. S/p bedside drainage of RLQ collection on 1/15/20 and upsized drain on 1/22/20. Pt. transferred to medical floor and has remained stable. Noted to have excess body weight.    Pt. seen and examined at bedside this AM. Pt. feels improved daily.    PAST HISTORY  --------------------------------------------------------------------------------  No significant changes to PMH, PSH, FHx, SHx, unless otherwise noted    ALLERGIES & MEDICATIONS  --------------------------------------------------------------------------------  Allergies    coconut (Anaphylaxis)  morphine (Pruritus; Rash)    Intolerances    Standing Inpatient Medications  allopurinol 100 milliGRAM(s) Oral daily  aMIOdarone    Tablet 200 milliGRAM(s) Oral daily  apixaban 5 milliGRAM(s) Oral two times a day  aspirin  chewable 81 milliGRAM(s) Oral daily  brimonidine 0.2% Ophthalmic Solution 1 Drop(s) Both EYES three times a day  chlorhexidine 2% Cloths 1 Application(s) Topical <User Schedule>  epoetin trey Injectable 52499 Unit(s) IV Push <User Schedule>  gabapentin 100 milliGRAM(s) Oral every 12 hours  latanoprost 0.005% Ophthalmic Solution 1 Drop(s) Both EYES daily  meropenem  IVPB 500 milliGRAM(s) IV Intermittent every 12 hours  micafungin IVPB 100 milliGRAM(s) IV Intermittent every 24 hours  Nephro-candace 1 Tablet(s) Oral daily    REVIEW OF SYSTEMS  --------------------------------------------------------------------------------  Gen: No lethargy  Respiratory: No dyspnea  CV: No chest pain  GI: No abdominal pain  MSK: + LE edema  Neuro: No dizziness  Heme: No bleeding    All other systems were reviewed and are negative, except as noted.    VITALS/PHYSICAL EXAM  --------------------------------------------------------------------------------  T(C): 37 (01-27-20 @ 05:15), Max: 37.2 (01-26-20 @ 17:00)  HR: 72 (01-27-20 @ 05:15) (68 - 72)  BP: 136/78 (01-27-20 @ 05:15) (101/62 - 136/78)  RR: 17 (01-27-20 @ 05:15) (17 - 18)  SpO2: 100% (01-27-20 @ 05:15) (96% - 100%)  Wt(kg): --    01-26-20 @ 07:01  -  01-27-20 @ 07:00  --------------------------------------------------------  IN: 980 mL / OUT: 115 mL / NET: 865 mL    Physical Exam:  	Gen: NAD, resting  	HEENT: supple neck  	Pulm: decreased breath sounds bilaterally  	CV: RRR, S1S2; no rub  	Abd: +BS, soft, RLQ with drain in place  	: No suprapubic tenderness  	UE: RUE swelling  	LE: b/l pitting edema, R>L (improved)    LABS/STUDIES  --------------------------------------------------------------------------------              7.5    15.72 >-----------<  853      [01-27-20 @ 06:42]              24.5     135  |  95  |  18  ----------------------------<  94      [01-27-20 @ 06:39]  4.5   |  25  |  5.42        Ca     8.7     [01-27-20 @ 06:39]      Mg     2.0     [01-27-20 @ 06:39]      Phos  2.8     [01-27-20 @ 06:39]    Creatinine Trend:  SCr 5.42 [01-27 @ 06:39]  SCr 4.16 [01-26 @ 06:07]  SCr 5.90 [01-25 @ 06:13]  SCr 4.59 [01-24 @ 01:39]  SCr 4.21 [01-23 @ 21:42]

## 2020-01-27 NOTE — PROGRESS NOTE ADULT - ATTENDING COMMENTS
Pt feels weak, very swollen  Significant edema on exam  Cont eliquis for DVT  PT/OT  Possibly adding UF sessions for fluid removal.  Cont antibiotics for now.  ID following.

## 2020-01-27 NOTE — PROGRESS NOTE ADULT - SUBJECTIVE AND OBJECTIVE BOX
No complaints    Vital Signs Last 24 Hrs  T(C): 37.6 (01-27-20 @ 13:44), Max: 37.6 (01-27-20 @ 13:44)  T(F): 99.6 (01-27-20 @ 13:44), Max: 99.6 (01-27-20 @ 13:44)  HR: 82 (01-27-20 @ 13:44) (68 - 82)  BP: 138/75 (01-27-20 @ 13:44) (101/62 - 138/75)  RR: 18 (01-27-20 @ 13:44) (17 - 18)  SpO2: 94% (01-27-20 @ 13:44) (94% - 100%)    Card S1S2  Lungs b/l air entry  Abd soft  Extr ++ edema b/l LE R > L, + edema RUE, AVF patent                                                                                                                                                                                          7.5    15.72 )-----------( 853      ( 27 Jan 2020 06:42 )             24.5     27 Jan 2020 06:39    135    |  95     |  18     ----------------------------<  94     4.5     |  25     |  5.42     Ca    8.7        27 Jan 2020 06:39  Phos  2.8       27 Jan 2020 06:39  Mg     2.0       27 Jan 2020 06:39    TPro  5.8    /  Alb  2.0    /  TBili  0.7    /  DBili  0.4    /  AST  25     /  ALT  <5     /  AlkPhos  292    27 Jan 2020 06:39    LIVER FUNCTIONS - ( 27 Jan 2020 06:39 )  Alb: 2.0 g/dL / Pro: 5.8 g/dL / ALK PHOS: 292 U/L / ALT: <5 U/L / AST: 25 U/L / GGT: x           PT/INR - ( 27 Jan 2020 10:16 )   PT: 24.9 sec;   INR: 2.13 ratio      acetaminophen   Tablet .. 975 milliGRAM(s) Oral every 6 hours PRN  allopurinol 100 milliGRAM(s) Oral daily  aMIOdarone    Tablet 200 milliGRAM(s) Oral daily  apixaban 5 milliGRAM(s) Oral two times a day  aspirin  chewable 81 milliGRAM(s) Oral daily  brimonidine 0.2% Ophthalmic Solution 1 Drop(s) Both EYES three times a day  chlorhexidine 2% Cloths 1 Application(s) Topical <User Schedule>  epoetin trey Injectable 70493 Unit(s) IV Push <User Schedule>  gabapentin 100 milliGRAM(s) Oral every 12 hours  latanoprost 0.005% Ophthalmic Solution 1 Drop(s) Both EYES daily  meropenem  IVPB 500 milliGRAM(s) IV Intermittent every 12 hours  micafungin IVPB 100 milliGRAM(s) IV Intermittent every 24 hours  Nephro-candace 1 Tablet(s) Oral daily  oxyCODONE    IR 10 milliGRAM(s) Oral every 6 hours PRN  oxyCODONE    IR 5 milliGRAM(s) Oral every 4 hours PRN    A/P:    Adm w/Flu A 12/27/19  Hx ESRD on HD, s/p DDRT 12/8/19, DGF  S/p transplant kidney bx 12/13: ATN, focal TMA  S/p PLEX 12/13, 12/15; d/c-d 12/20 w/op HD 3 x wk  Re-adm 12/27 w/infected carlos-nephric hematoma   S/p OR 1/1 for washout and repeat renal graft biopsy (c/w ATN)  Dx w/RLE DVT  Developed gross hematuria  S/p OR 1/10 for infected carlos-nephric hematoma, hemorrhage involving pseudo-aneurysm of anastomosis of renal artery to external iliac vein,   s/p transplant nephrectomy, s/p IVCF   S/p PEA arrest 1/13, intubated, s/p CTA, + PE, R retrop collection  S/p IR drainage of abd collection  New RUE DVT  Abx, fever w/up per ID  HD TTS  UF today 2hr x 2kg fluid removal  Epogen w/HD 3 x week

## 2020-01-27 NOTE — PROGRESS NOTE ADULT - SUBJECTIVE AND OBJECTIVE BOX
[facility] - Melissa Kelsey, Enrique, Maximino, Marin, Ravi Goodson  Office Number:  [phone]    Patient resting comfortably in bed in NAD.  Laying flat with no respiratory distress.  No complaints of chest pain, dyspnea, palpitations, PND, or orthopnea.  mild pleuritic chest pain yesterday when using spirometer    ROS: negative unless otherwise mentioned.    Telemetry:  sr    MEDICATIONS  (STANDING):  allopurinol 100 milliGRAM(s) Oral daily  aMIOdarone    Tablet 200 milliGRAM(s) Oral daily  apixaban 5 milliGRAM(s) Oral two times a day  aspirin  chewable 81 milliGRAM(s) Oral daily  brimonidine 0.2% Ophthalmic Solution 1 Drop(s) Both EYES three times a day  chlorhexidine 2% Cloths 1 Application(s) Topical <User Schedule>  epoetin trey Injectable 07826 Unit(s) IV Push <User Schedule>  gabapentin 100 milliGRAM(s) Oral every 12 hours  latanoprost 0.005% Ophthalmic Solution 1 Drop(s) Both EYES daily  meropenem  IVPB 500 milliGRAM(s) IV Intermittent every 12 hours  micafungin IVPB 100 milliGRAM(s) IV Intermittent every 24 hours  Nephro-candace 1 Tablet(s) Oral daily    MEDICATIONS  (PRN):  acetaminophen   Tablet .. 975 milliGRAM(s) Oral every 6 hours PRN Mild Pain (1 - 3)  oxyCODONE    IR 10 milliGRAM(s) Oral every 6 hours PRN Severe Pain (7 - 10)  oxyCODONE    IR 5 milliGRAM(s) Oral every 4 hours PRN Moderate Pain (4 - 6)      Allergies    coconut (Anaphylaxis)  morphine (Pruritus; Rash)    Intolerances        Vital Signs Last 24 Hrs  T(C): 37 (27 Jan 2020 05:15), Max: 37.7 (26 Jan 2020 09:00)  T(F): 98.6 (27 Jan 2020 05:15), Max: 99.8 (26 Jan 2020 09:00)  HR: 72 (27 Jan 2020 05:15) (68 - 72)  BP: 136/78 (27 Jan 2020 05:15) (101/62 - 136/78)  BP(mean): --  RR: 17 (27 Jan 2020 05:15) (17 - 18)  SpO2: 100% (27 Jan 2020 05:15) (94% - 100%)    I&O's Summary    26 Jan 2020 07:01  -  27 Jan 2020 07:00  --------------------------------------------------------  IN: 980 mL / OUT: 115 mL / NET: 865 mL        ON EXAM:    Constitutional: well-nourished, well-developed, NAD   HEENT:  MMM, sclerae anicteric, conjunctivae clear, no oral cyanosis.  Pulmonary: Non-labored, breath sounds are clear bilaterally, No wheezing, rales or rhonchi  Cardiovascular: Regular, S1 and S2.  1-2/6 apical sys murmur.  No rubs, gallops or clicks  Gastrointestinal: Bowel Sounds present, soft, nontender.   Lymph: jolynn peripheral edema, legs wrapped.   Neurological: Alert, no focal deficits  Skin: No rashes.  Psych:  Mood & affect appropriate    LABS: All Labs Reviewed:                        7.5    15.72 )-----------( 853      ( 27 Jan 2020 06:42 )             24.5                         7.2    14.69 )-----------( 822      ( 26 Jan 2020 06:07 )             23.0                         7.7    15.03 )-----------( 795      ( 25 Jan 2020 06:13 )             24.1     26 Jan 2020 06:07    135    |  97     |  12     ----------------------------<  91     4.0     |  26     |  4.16   25 Jan 2020 06:13    133    |  94     |  20     ----------------------------<  89     3.8     |  27     |  5.90     Ca    8.6        26 Jan 2020 06:07  Ca    8.9        25 Jan 2020 06:13  Phos  1.9       26 Jan 2020 06:07  Phos  3.1       25 Jan 2020 06:13  Mg     1.9       26 Jan 2020 06:07  Mg     2.1       25 Jan 2020 06:13    TPro  5.7    /  Alb  1.7    /  TBili  0.7    /  DBili  0.4    /  AST  22     /  ALT  <5     /  AlkPhos  285    26 Jan 2020 06:07  TPro  5.8    /  Alb  2.1    /  TBili  1.2    /  DBili  0.8    /  AST  20     /  ALT  5      /  AlkPhos  307    25 Jan 2020 06:13    PT/INR - ( 26 Jan 2020 06:07 )   PT: 21.1 sec;   INR: 1.82 ratio         PTT - ( 26 Jan 2020 06:07 )  PTT:40.0 sec      Blood Culture: Organism --  Gram Stain Blood -- Gram Stain --  Specimen Source .Blood Blood  Culture-Blood --    Organism --  Gram Stain Blood -- Gram Stain --  Specimen Source .Body Fluid RLQ Fluid Collection  Culture-Blood --    Organism --  Gram Stain Blood -- Gram Stain --  Specimen Source .Blood Blood  Culture-Blood --    Organism --  Gram Stain Blood -- Gram Stain   polymorphonuclear leukocytes seen  Gram Negative Rods seen  by cytocentrifuge  Specimen Source .Body Fluid Abdominal Fluid  Culture-Blood --

## 2020-01-27 NOTE — PROGRESS NOTE ADULT - ASSESSMENT
49 year old male with ESRD on HD, now s/p ddrt on 12/8/2019 course complicated by TMA/profound ATN, who presents from a HD center with a fever and cough. He was initially admitted with influenza. He is s/p washout of infected collection and biopsy which revealed ATN.  He was found to have diffusely enlarging perinephric fluid collection.    While on telemetry, he had an episode of a wide complex tachycardia. It initially appears irregular, suggesting an atrial arrhythmia with aberrancy, though the remainder appears more like an episode of non-sustained VT.   s/p washout on 1/1/2020 with worsening sepsis and transfer to ICU, s/p IR drainage of perinephric collection on 1/6. 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 He underwent operative repair of right external iliac artery with pericardial patch, transplant nephrectomy, RLE thrombectomy, and IVC filter on 1/9.     Now s/p corrina ->tachy arrest with tdp, af rvr, now on amio, with large PE    - developed malignant arrhythmias which seemed to start with bradycardia, leading to more tachycardia and bursts of VT and rapid AF.  The apparent af degenerated into torsades.  He was shocked and has been maintaining SR since  - cont amio at 200 daily. No further events on telemetry  - restart metoprolol 12.5 mg po bid (BP now stable)  - repeat ekg daily to evaluate qtc, while on antifungals/amiodarone  - arrhythmias were most likely triggered by the massive vte event, are unlikely to represent a primary cardiac issue  - hs trop noted, though in the context of his arrhythmias, and right heart strain from the pe, the trop is more likely to reflect demand than an acs event  - cont asa     - now on apixiban  - echo with preserved/hyperdynamic lv, with RV failure consistent with acute RV overload  - CTPA on 1/21 demonstrated some resolution of PE and evolving pulmonary infarcts.   - Further cardiac workup will depend on clinical course.  - will follow with you

## 2020-01-28 LAB
ALBUMIN SERPL ELPH-MCNC: 2.3 G/DL — LOW (ref 3.3–5)
ALP SERPL-CCNC: 288 U/L — HIGH (ref 40–120)
ALT FLD-CCNC: <5 U/L — LOW (ref 10–45)
ANION GAP SERPL CALC-SCNC: 14 MMOL/L — SIGNIFICANT CHANGE UP (ref 5–17)
APTT BLD: 43.2 SEC — HIGH (ref 27.5–36.3)
AST SERPL-CCNC: 20 U/L — SIGNIFICANT CHANGE UP (ref 10–40)
BASOPHILS # BLD AUTO: 0.26 K/UL — HIGH (ref 0–0.2)
BASOPHILS NFR BLD AUTO: 1.7 % — SIGNIFICANT CHANGE UP (ref 0–2)
BILIRUB DIRECT SERPL-MCNC: 0.3 MG/DL — HIGH (ref 0–0.2)
BILIRUB INDIRECT FLD-MCNC: 0.3 MG/DL — SIGNIFICANT CHANGE UP (ref 0.2–1)
BILIRUB SERPL-MCNC: 0.6 MG/DL — SIGNIFICANT CHANGE UP (ref 0.2–1.2)
BUN SERPL-MCNC: 25 MG/DL — HIGH (ref 7–23)
CALCIUM SERPL-MCNC: 9.1 MG/DL — SIGNIFICANT CHANGE UP (ref 8.4–10.5)
CHLORIDE SERPL-SCNC: 96 MMOL/L — SIGNIFICANT CHANGE UP (ref 96–108)
CO2 SERPL-SCNC: 27 MMOL/L — SIGNIFICANT CHANGE UP (ref 22–31)
CREAT SERPL-MCNC: 6.65 MG/DL — HIGH (ref 0.5–1.3)
EOSINOPHIL # BLD AUTO: 0.14 K/UL — SIGNIFICANT CHANGE UP (ref 0–0.5)
EOSINOPHIL NFR BLD AUTO: 0.9 % — SIGNIFICANT CHANGE UP (ref 0–6)
GLUCOSE SERPL-MCNC: 98 MG/DL — SIGNIFICANT CHANGE UP (ref 70–99)
HCT VFR BLD CALC: 23.9 % — LOW (ref 39–50)
HGB BLD-MCNC: 7.1 G/DL — LOW (ref 13–17)
INR BLD: 1.7 RATIO — HIGH (ref 0.88–1.16)
LYMPHOCYTES # BLD AUTO: 13 % — SIGNIFICANT CHANGE UP (ref 13–44)
LYMPHOCYTES # BLD AUTO: 2 K/UL — SIGNIFICANT CHANGE UP (ref 1–3.3)
MAGNESIUM SERPL-MCNC: 2 MG/DL — SIGNIFICANT CHANGE UP (ref 1.6–2.6)
MCHC RBC-ENTMCNC: 28.5 PG — SIGNIFICANT CHANGE UP (ref 27–34)
MCHC RBC-ENTMCNC: 29.7 GM/DL — LOW (ref 32–36)
MCV RBC AUTO: 96 FL — SIGNIFICANT CHANGE UP (ref 80–100)
MONOCYTES # BLD AUTO: 1.87 K/UL — HIGH (ref 0–0.9)
MONOCYTES NFR BLD AUTO: 12.2 % — SIGNIFICANT CHANGE UP (ref 2–14)
NEUTROPHILS # BLD AUTO: 10.94 K/UL — HIGH (ref 1.8–7.4)
NEUTROPHILS NFR BLD AUTO: 71.3 % — SIGNIFICANT CHANGE UP (ref 43–77)
PHOSPHATE SERPL-MCNC: 3.3 MG/DL — SIGNIFICANT CHANGE UP (ref 2.5–4.5)
PLATELET # BLD AUTO: 878 K/UL — HIGH (ref 150–400)
POTASSIUM SERPL-MCNC: 4.4 MMOL/L — SIGNIFICANT CHANGE UP (ref 3.5–5.3)
POTASSIUM SERPL-SCNC: 4.4 MMOL/L — SIGNIFICANT CHANGE UP (ref 3.5–5.3)
PROCALCITONIN SERPL-MCNC: 1.12 NG/ML — HIGH (ref 0.02–0.1)
PROT SERPL-MCNC: 6.2 G/DL — SIGNIFICANT CHANGE UP (ref 6–8.3)
PROTHROM AB SERPL-ACNC: 19.7 SEC — HIGH (ref 10–12.9)
RBC # BLD: 2.49 M/UL — LOW (ref 4.2–5.8)
RBC # FLD: 16.9 % — HIGH (ref 10.3–14.5)
SODIUM SERPL-SCNC: 137 MMOL/L — SIGNIFICANT CHANGE UP (ref 135–145)
WBC # BLD: 15.35 K/UL — HIGH (ref 3.8–10.5)
WBC # FLD AUTO: 15.35 K/UL — HIGH (ref 3.8–10.5)

## 2020-01-28 PROCEDURE — 99232 SBSQ HOSP IP/OBS MODERATE 35: CPT | Mod: GC

## 2020-01-28 PROCEDURE — 71260 CT THORAX DX C+: CPT | Mod: 26

## 2020-01-28 PROCEDURE — 93010 ELECTROCARDIOGRAM REPORT: CPT

## 2020-01-28 PROCEDURE — 99232 SBSQ HOSP IP/OBS MODERATE 35: CPT

## 2020-01-28 PROCEDURE — 74177 CT ABD & PELVIS W/CONTRAST: CPT | Mod: 26

## 2020-01-28 RX ORDER — METOPROLOL TARTRATE 50 MG
12.5 TABLET ORAL
Refills: 0 | Status: DISCONTINUED | OUTPATIENT
Start: 2020-01-28 | End: 2020-01-29

## 2020-01-28 RX ADMIN — Medication 12.5 MILLIGRAM(S): at 18:03

## 2020-01-28 RX ADMIN — Medication 100 MILLIGRAM(S): at 12:25

## 2020-01-28 RX ADMIN — OXYCODONE HYDROCHLORIDE 10 MILLIGRAM(S): 5 TABLET ORAL at 22:05

## 2020-01-28 RX ADMIN — OXYCODONE HYDROCHLORIDE 10 MILLIGRAM(S): 5 TABLET ORAL at 13:18

## 2020-01-28 RX ADMIN — BRIMONIDINE TARTRATE 1 DROP(S): 2 SOLUTION/ DROPS OPHTHALMIC at 14:10

## 2020-01-28 RX ADMIN — MICAFUNGIN SODIUM 105 MILLIGRAM(S): 100 INJECTION, POWDER, LYOPHILIZED, FOR SOLUTION INTRAVENOUS at 15:01

## 2020-01-28 RX ADMIN — OXYCODONE HYDROCHLORIDE 10 MILLIGRAM(S): 5 TABLET ORAL at 13:48

## 2020-01-28 RX ADMIN — Medication 81 MILLIGRAM(S): at 12:25

## 2020-01-28 RX ADMIN — OXYCODONE HYDROCHLORIDE 10 MILLIGRAM(S): 5 TABLET ORAL at 21:14

## 2020-01-28 RX ADMIN — APIXABAN 5 MILLIGRAM(S): 2.5 TABLET, FILM COATED ORAL at 06:36

## 2020-01-28 RX ADMIN — BRIMONIDINE TARTRATE 1 DROP(S): 2 SOLUTION/ DROPS OPHTHALMIC at 21:13

## 2020-01-28 RX ADMIN — ERYTHROPOIETIN 10000 UNIT(S): 10000 INJECTION, SOLUTION INTRAVENOUS; SUBCUTANEOUS at 23:39

## 2020-01-28 RX ADMIN — APIXABAN 5 MILLIGRAM(S): 2.5 TABLET, FILM COATED ORAL at 18:03

## 2020-01-28 RX ADMIN — LATANOPROST 1 DROP(S): 0.05 SOLUTION/ DROPS OPHTHALMIC; TOPICAL at 12:25

## 2020-01-28 RX ADMIN — Medication 1 TABLET(S): at 12:25

## 2020-01-28 RX ADMIN — GABAPENTIN 100 MILLIGRAM(S): 400 CAPSULE ORAL at 06:36

## 2020-01-28 RX ADMIN — CHLORHEXIDINE GLUCONATE 1 APPLICATION(S): 213 SOLUTION TOPICAL at 06:41

## 2020-01-28 RX ADMIN — GABAPENTIN 100 MILLIGRAM(S): 400 CAPSULE ORAL at 18:03

## 2020-01-28 RX ADMIN — MEROPENEM 100 MILLIGRAM(S): 1 INJECTION INTRAVENOUS at 06:36

## 2020-01-28 RX ADMIN — BRIMONIDINE TARTRATE 1 DROP(S): 2 SOLUTION/ DROPS OPHTHALMIC at 06:37

## 2020-01-28 RX ADMIN — OXYCODONE HYDROCHLORIDE 10 MILLIGRAM(S): 5 TABLET ORAL at 06:34

## 2020-01-28 RX ADMIN — AMIODARONE HYDROCHLORIDE 200 MILLIGRAM(S): 400 TABLET ORAL at 06:37

## 2020-01-28 RX ADMIN — OXYCODONE HYDROCHLORIDE 10 MILLIGRAM(S): 5 TABLET ORAL at 07:30

## 2020-01-28 NOTE — PROGRESS NOTE ADULT - ASSESSMENT
49M PMHx of ESRD on HD  ( via LUE AVF), HTN, Gout, Glaucoma, NET of pancreas, RA with hand contractures. s/p DDRT 12/8/19,  Post op course c/b DGF requiring HD. Renal bx x 2 c/w profound ATN.     Re-admitted with Influenza and perinephric hematoma. s/p hematoma evacuation/ abd washout and renal biopsy. Post op course c/b leukocytosis, infected hematoma, acute RLE DVT, and bleeding pseudoaneurysm at txp renal artery anastomosis. Now s/p graft nephrectomy. Course further complicated by saddle PE causing right heart strain and hemodynamic instability leading to cardiac arrest requiring ACLS, Required IR drainage of post-op collections x2. Transferred to floor on 1/25/2020.     [] DDRT c/b DGF/ATN/perinephric infected hematoma/pseudoneurysm/graft nephrectomy   - HD per nephrology team. plan for  additional sessions this week to help with edema. HD today,   - s/p bedside IR drainage of RLQ fluid collection (1/15), drain repositioned 1/22 and cultures pending result this far negative  - FU final IR culture, fungal culture from 1/22- pending.   -Repeat chest/abd/pelvis CT ordered  - ID following;  cont Meropenem, Micafungin 100mg daily.  Will discuss with ID on duration likely need long term antibiotics.  - pain control  - bowel regimen  - PT/OT/OOB.  VAC change per PT  - Keep B/L LE bandaged with ace wrap and skin care      [] Saddle PE, with right heart strain and complicated by PEA arrest followed by Torsade, ROSC achieved after ACLS  - vascular, CT and vascular cardiology following   -Repeat chest/abd/pelvis CT ordered.   - CT 1/21: There are pulmonary emboli within the right lower lobar arteries and the left lower lobar arteries extending into the distal segments as seen on prior study. The pulmonary emboli seen in the right and left pulmonary arteries are normal longer visualized. New distal to mid brachial DVT noted  1/19 Duplex: RUE Deep vein thrombosis in the mid to distal brachial vein.  - Continue Eliquis to 5mg PO BID   - continue ASA  - repeat ECHO with improved R heart strain      [] New acute DVT of R distal brachia vein   - continue anticoagulation     [] New acute DVT of R external iliac, common femoral, femoral veins, R soleal vein requiring heparin drip  - IVC filter placed 1/9, continue anticoagulation     [] Thrombocytosis  - rising INR/PLT counts over the past week.   -Heme consulted; likely reactive. Heme work up in progress    [] Dispo  - continue tele  -acute rehab once medically stable.

## 2020-01-28 NOTE — PROGRESS NOTE ADULT - SUBJECTIVE AND OBJECTIVE BOX
[facility]    Melissa Kelsey, Enrique, Maximino, Marin, Hamzah, Ravi      [phone]    CHIEF COMPLAINT: Patient is a 49y old  Male who presents with a chief complaint of Fever (28 Jan 2020 11:29)      Follow Up: pe, vt    Interim history: The patient reports no new symptoms.  Denies chest discomfort and shortness of breath.  No abdominal pain.  No new neurologic symptoms.      MEDICATIONS  (STANDING):  allopurinol 100 milliGRAM(s) Oral daily  aMIOdarone    Tablet 200 milliGRAM(s) Oral daily  apixaban 5 milliGRAM(s) Oral two times a day  aspirin  chewable 81 milliGRAM(s) Oral daily  brimonidine 0.2% Ophthalmic Solution 1 Drop(s) Both EYES three times a day  chlorhexidine 2% Cloths 1 Application(s) Topical <User Schedule>  epoetin trey Injectable 02135 Unit(s) IV Push <User Schedule>  gabapentin 100 milliGRAM(s) Oral every 12 hours  latanoprost 0.005% Ophthalmic Solution 1 Drop(s) Both EYES daily  meropenem  IVPB 500 milliGRAM(s) IV Intermittent every 12 hours  micafungin IVPB 100 milliGRAM(s) IV Intermittent every 24 hours  Nephro-candace 1 Tablet(s) Oral daily    MEDICATIONS  (PRN):  acetaminophen   Tablet .. 975 milliGRAM(s) Oral every 6 hours PRN Mild Pain (1 - 3)  oxyCODONE    IR 10 milliGRAM(s) Oral every 6 hours PRN Severe Pain (7 - 10)  oxyCODONE    IR 5 milliGRAM(s) Oral every 4 hours PRN Moderate Pain (4 - 6)      REVIEW OF SYSTEMS:  eye, ent, GI, , allergic, dermatologic, musculoskeletal and neurologic are negative except as described above    Vital Signs Last 24 Hrs  T(C): 37.2 (28 Jan 2020 09:00), Max: 37.6 (27 Jan 2020 13:44)  T(F): 98.9 (28 Jan 2020 09:00), Max: 99.6 (27 Jan 2020 13:44)  HR: 75 (28 Jan 2020 09:00) (69 - 82)  BP: 125/69 (28 Jan 2020 09:00) (111/64 - 138/75)  BP(mean): --  RR: 18 (28 Jan 2020 09:00) (18 - 18)  SpO2: 97% (28 Jan 2020 09:00) (94% - 100%)    I&O's Summary    27 Jan 2020 07:01  -  28 Jan 2020 07:00  --------------------------------------------------------  IN: 1090 mL / OUT: 2080 mL / NET: -990 mL    28 Jan 2020 07:01  -  28 Jan 2020 12:02  --------------------------------------------------------  IN: 240 mL / OUT: 25 mL / NET: 215 mL        Telemetry past 24h: sr    PHYSICAL EXAM:    Constitutional: well-nourished, well-developed, NAD   HEENT:  MMM, sclerae anicteric, conjunctivae clear, no oral cyanosis.  Pulmonary: Non-labored, breath sounds are clear bilaterally, No wheezing, rales or rhonchi  Cardiovascular: Regular, S1 and S2.  No murmur.  No rubs, gallops or clicks  Gastrointestinal: Bowel Sounds present, soft, nontender.   Lymph: mod peripheral edema.   Neurological: Alert, no focal deficits  Skin: No rashes.  Psych:  Mood & affect appropriate    LABS: All Labs Reviewed:                        7.1    15.35 )-----------( 878      ( 28 Jan 2020 06:20 )             23.9                         7.5    15.72 )-----------( 853      ( 27 Jan 2020 06:42 )             24.5                         7.2    14.69 )-----------( 822      ( 26 Jan 2020 06:07 )             23.0     28 Jan 2020 06:20    137    |  96     |  25     ----------------------------<  98     4.4     |  27     |  6.65   27 Jan 2020 06:39    135    |  95     |  18     ----------------------------<  94     4.5     |  25     |  5.42   26 Jan 2020 06:07    135    |  97     |  12     ----------------------------<  91     4.0     |  26     |  4.16     Ca    9.1        28 Jan 2020 06:20  Ca    8.7        27 Jan 2020 06:39  Ca    8.6        26 Jan 2020 06:07  Phos  3.3       28 Jan 2020 06:20  Phos  2.8       27 Jan 2020 06:39  Phos  1.9       26 Jan 2020 06:07  Mg     2.0       28 Jan 2020 06:20  Mg     2.0       27 Jan 2020 06:39  Mg     1.9       26 Jan 2020 06:07    TPro  6.2    /  Alb  2.3    /  TBili  0.6    /  DBili  0.3    /  AST  20     /  ALT  <5     /  AlkPhos  288    28 Jan 2020 06:20  TPro  5.8    /  Alb  2.0    /  TBili  0.7    /  DBili  0.4    /  AST  25     /  ALT  <5     /  AlkPhos  292    27 Jan 2020 06:39  TPro  5.7    /  Alb  1.7    /  TBili  0.7    /  DBili  0.4    /  AST  22     /  ALT  <5     /  AlkPhos  285    26 Jan 2020 06:07    PT/INR - ( 28 Jan 2020 06:20 )   PT: 19.7 sec;   INR: 1.70 ratio         PTT - ( 28 Jan 2020 06:20 )  PTT:43.2 sec      Blood Culture: Organism --  Gram Stain Blood -- Gram Stain --  Specimen Source .Blood Blood  Culture-Blood --    Organism --  Gram Stain Blood -- Gram Stain --  Specimen Source .Body Fluid RLQ Fluid Collection  Culture-Blood --    Organism --  Gram Stain Blood -- Gram Stain --  Specimen Source .Blood Blood  Culture-Blood --            RADIOLOGY:    EKG:    Echo:

## 2020-01-28 NOTE — PROGRESS NOTE ADULT - ASSESSMENT
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 on 12/27 with cough and fever. RVP with Influenza A s/p Tamiflu treatment course. Now with infected carlos-transplant hematoma.    1/1: s/p Ex-Lap and washout of infected hematoma (Enterobacter from cultures)  1/6: s/p IR guided drainage attempt of hematoma on 1/6 (NGTD from cultures)  1/10: s/p transplant nephrectomy, repair of right external iliac artery with a bovine pericardial patch, repair of right external iliac vein and thrombectomy of right lower extremity veins (Surgical cultures with Enterobacter)  1/15: s/p IR guided drainage (220cc of hematoma)  1/22: s/p IR drain repositioning and upsizing (Gram stain with GNR but so far NGTD)    PEA arrest and Torsades on 1/13  CT C/A/P (1/13) with Saddle PE, interval increase in size of RP fluid collection (infected hematoma)  CT C/A/P (1/21) with pulmonary infarcts and residual hematoma (decreased in size)    Some of the Enterobacter isolates R and S to Ertapenem  per core lab Ertapenem at borderline of Susceptible and Resistant breakpoint  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   Given concern for intravascular infection I would treat for 6 weeks from 1/10 nephrectomy (End Date: 2/20/20)  I would continue Meropenem while admitted and switch to Ciprofloxacin on discharge    Fevers resolved, leukocytosis stable and procalcitonin decreasing.   Fevers & leukocytosis likely secondary to either pulmonary infarcts or from residual hematoma/collection.   Fluid sampling from 1/22 drain repositioning / upsize with GNR on gram stain.   So far no growth to date. Anticipate Enterobacter if there is growth    Overall, Leukocytosis (stable), Fever (resolving), Infected Hematoma, Saddle pulmonary embolus, Positive Urine Culture, Renal Transplant Recipient    --Continue Meropenem 500 mg IV Q24H (anticipate switch to Cipro PO on discharge - End Date: 2/20/20)  --Would consider discontinuing Micafungin - no evidence of yeast in prior cultures.   --Continue to follow CBC with diff  --Continue to follow temperature curve  --Follow up on preliminary blood cultures   --Follow up on preliminary 1/22 and 1/24 IR cultures   --Management of Saddle PE per Transplant and vascular teams    I will continue to follow. Please feel free to contact me with any further questions.    Dada Jacobo M.D.  [facility] Division of Infectious Disease  8AM-5PM: Pager Number [phone]  After Hours (or if no response): Please contact the Infectious Diseases Office at [phone]

## 2020-01-28 NOTE — PROGRESS NOTE ADULT - ASSESSMENT
49 year old male with ESRD on HD, now s/p ddrt on 12/8/2019 course complicated by TMA/profound ATN, who presents from a HD center with a fever and cough. He was initially admitted with influenza. He is s/p washout of infected collection and biopsy which revealed ATN.  He was found to have diffusely enlarging perinephric fluid collection.    While on telemetry, he had an episode of a wide complex tachycardia. It initially appears irregular, suggesting an atrial arrhythmia with aberrancy, though the remainder appears more like an episode of non-sustained VT.   s/p washout on 1/1/2020 with worsening sepsis and transfer to ICU, s/p IR drainage of perinephric collection on 1/6. 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 He underwent operative repair of right external iliac artery with pericardial patch, transplant nephrectomy, RLE thrombectomy, and IVC filter on 1/9.     Now s/p corrina ->tachy arrest with tdp, af rvr, now on amio, with large PE    - developed malignant arrhythmias which seemed to start with bradycardia, leading to more tachycardia and bursts of VT and rapid AF.  The apparent af degenerated into torsades.  He was shocked and has been maintaining SR since  - cont amio at 200 daily. No further events on telemetry  - please restart metoprolol 12.5 mg po bid (BP now stable)  - repeat ekg daily to evaluate qtc, while on antifungals/amiodarone  - arrhythmias were most likely triggered by the massive vte event, are unlikely to represent a primary cardiac issue  - hs trop noted, though in the context of his arrhythmias, and right heart strain from the pe, the trop is more likely to reflect demand than an acs event  - cont asa     - now on apixiban  - echo with preserved/hyperdynamic lv, with RV failure consistent with acute RV overload  - CTPA on 1/21 demonstrated some resolution of PE and evolving pulmonary infarcts.   - Further cardiac workup will depend on clinical course.  - will follow with you

## 2020-01-28 NOTE — PROGRESS NOTE ADULT - SUBJECTIVE AND OBJECTIVE BOX
[facility] DIVISION OF KIDNEY DISEASES AND HYPERTENSION -- FOLLOW UP NOTE  --------------------------------------------------------------------------------  HPI: 49 yr old man with ESRD s/p DDRT in Dec 2019 complicated by DGF due to ATN and early TMA thought to be due to prograf. Was on belatacept/MMF/Pred immunosuppression. He was admitted with influenza, course complicated by perinephric hematoma complicated by enterobacter infection s/p surgical exploration and wash out. He developed DVT started on heparin drip, subsequent gross hematuria. He was taken to the OR and found to have dehiscence of the ureter-bladder anastomosis as well as vascular anastomosis. He underwent graft nephrectomy on 1/10/20 with bovine biopatch repair of the EIA. On 1/13/20 he had cardiac arrest due to large pulmonary embolism s/p successful resuscitation. S/p bedside drainage of RLQ collection on 1/15/20 and upsized drain on 1/22/20. Pt. transferred to medical floor and has remained stable. Noted to have excess body weight. Completed UF treatment yesterday with 2L tolerated.    Pt. seen and examined at bedside this AM. Pt. feels improved daily.    PAST HISTORY  --------------------------------------------------------------------------------  No significant changes to PMH, PSH, FHx, SHx, unless otherwise noted    ALLERGIES & MEDICATIONS  --------------------------------------------------------------------------------  Allergies    coconut (Anaphylaxis)  morphine (Pruritus; Rash)    Intolerances    Standing Inpatient Medications  allopurinol 100 milliGRAM(s) Oral daily  aMIOdarone    Tablet 200 milliGRAM(s) Oral daily  apixaban 5 milliGRAM(s) Oral two times a day  aspirin  chewable 81 milliGRAM(s) Oral daily  brimonidine 0.2% Ophthalmic Solution 1 Drop(s) Both EYES three times a day  chlorhexidine 2% Cloths 1 Application(s) Topical <User Schedule>  epoetin trey Injectable 08485 Unit(s) IV Push <User Schedule>  gabapentin 100 milliGRAM(s) Oral every 12 hours  latanoprost 0.005% Ophthalmic Solution 1 Drop(s) Both EYES daily  meropenem  IVPB 500 milliGRAM(s) IV Intermittent every 12 hours  micafungin IVPB 100 milliGRAM(s) IV Intermittent every 24 hours  Nephro-candace 1 Tablet(s) Oral daily    REVIEW OF SYSTEMS  --------------------------------------------------------------------------------  Gen: No lethargy  Respiratory: No dyspnea  CV: No chest pain  GI: No abdominal pain  MSK: + LE edema  Neuro: No dizziness  Heme: No bleeding    All other systems were reviewed and are negative, except as noted.    VITALS/PHYSICAL EXAM  --------------------------------------------------------------------------------  T(C): 36.9 (01-28-20 @ 05:00), Max: 37.6 (01-27-20 @ 13:44)  HR: 71 (01-28-20 @ 06:33) (69 - 82)  BP: 134/77 (01-28-20 @ 06:33) (111/64 - 138/75)  RR: 18 (01-28-20 @ 05:00) (18 - 18)  SpO2: 95% (01-28-20 @ 05:00) (94% - 100%)  Wt(kg): --    01-27-20 @ 07:01  -  01-28-20 @ 07:00  --------------------------------------------------------  IN: 920 mL / OUT: 2030 mL / NET: -1110 mL    Physical Exam:  	Gen: NAD, resting  	HEENT: supple neck  	Pulm: decreased breath sounds bilaterally  	CV: RRR, S1S2; no rub  	Abd: +BS, soft, RLQ with drain in place  	: No suprapubic tenderness  	UE: RUE swelling  	LE: b/l pitting edema, R>L (improved)    LABS/STUDIES  --------------------------------------------------------------------------------              7.1    15.35 >-----------<  878      [01-28-20 @ 06:20]              23.9     137  |  96  |  25  ----------------------------<  98      [01-28-20 @ 06:20]  4.4   |  27  |  6.65        Ca     9.1     [01-28-20 @ 06:20]      Mg     2.0     [01-28-20 @ 06:20]      Phos  3.3     [01-28-20 @ 06:20]    Creatinine Trend:  SCr 6.65 [01-28 @ 06:20]  SCr 5.42 [01-27 @ 06:39]  SCr 4.16 [01-26 @ 06:07]  SCr 5.90 [01-25 @ 06:13]  SCr 4.59 [01-24 @ 01:39]

## 2020-01-28 NOTE — PROGRESS NOTE ADULT - PROBLEM SELECTOR PLAN 1
Last HD treatment on 1/25/20, with PUF on 1/27/20. Follows with nephrologist, Dr Amaya. For maintenance HD treatment today with UF.

## 2020-01-28 NOTE — PROGRESS NOTE ADULT - SUBJECTIVE AND OBJECTIVE BOX
Follow Up:  infected hematoma    Interval History: no chills or fevers. no n/v/d or abdominal pain.     REVIEW OF SYSTEMS  [  ] ROS unobtainable because:    [ x ] All other systems negative except as noted below    Constitutional:  [ ] fever [ ] chills  [ ] weight loss  [ ] weakness  Skin:  [ ] rash [ ] phlebitis	  Eyes: [ ] icterus [ ] pain  [ ] discharge	  ENMT: [ ] sore throat  [ ] thrush [ ] ulcers [ ] exudates  Respiratory: [ ] dyspnea [ ] hemoptysis [ ] cough [ ] sputum	  Cardiovascular:  [ ] chest pain [ ] palpitations [ ] edema	  Gastrointestinal:  [ ] nausea [ ] vomiting [ ] diarrhea [ ] constipation [ ] pain	  Genitourinary:  [ ] dysuria [ ] frequency [ ] hematuria [ ] discharge [ ] flank pain  [ ] incontinence  Musculoskeletal:  [ ] myalgias [ ] arthralgias [ ] arthritis  [ ] back pain  Neurological:  [ ] headache [ ] seizures  [ ] confusion/altered mental status    Allergies  coconut (Anaphylaxis)  morphine (Pruritus; Rash)        ANTIMICROBIALS:  meropenem  IVPB 500 every 12 hours  micafungin IVPB 100 every 24 hours      OTHER MEDS:  MEDICATIONS  (STANDING):  acetaminophen   Tablet .. 975 every 6 hours PRN  allopurinol 100 daily  aMIOdarone    Tablet 200 daily  apixaban 5 two times a day  aspirin  chewable 81 daily  epoetin trey Injectable 38306 <User Schedule>  gabapentin 100 every 12 hours  metoprolol tartrate 12.5 two times a day  oxyCODONE    IR 10 every 6 hours PRN  oxyCODONE    IR 5 every 4 hours PRN      Vital Signs Last 24 Hrs  T(C): 37.4 (28 Jan 2020 13:00), Max: 37.5 (27 Jan 2020 20:40)  T(F): 99.3 (28 Jan 2020 13:00), Max: 99.5 (27 Jan 2020 20:40)  HR: 76 (28 Jan 2020 13:00) (69 - 82)  BP: 140/84 (28 Jan 2020 13:00) (111/64 - 140/84)  BP(mean): --  RR: 18 (28 Jan 2020 13:00) (18 - 18)  SpO2: 98% (28 Jan 2020 13:00) (95% - 100%)    PHYSICAL EXAMINATION:  General: Alert and Awake, NAD  HEENT: PERRL, EOMI  Neck: Supple  Cardiac: RRR, No M/R/G  Resp: CTAB, No Wh/Rh/Ra  Abdomen: +RLQ drain with serosanguinous drainage. +RLQ wound vac over transplant nephrectomy site, NBS, mild tenderness to palpation over RLQ  MSK: 1-2+ RLE edema. No Calf tenderness  Skin: No rashes or lesions. Skin is warm and dry to the touch.   Neuro: Alert and Awake. CN 2-12 Grossly intact. Moves all four extremities spontaneously.  Psych: Calm, Pleasant, Cooperative                        7.1    15.35 )-----------( 878      ( 28 Jan 2020 06:20 )             23.9       01-28    137  |  96  |  25<H>  ----------------------------<  98  4.4   |  27  |  6.65<H>    Ca    9.1      28 Jan 2020 06:20  Phos  3.3     01-28  Mg     2.0     01-28    TPro  6.2  /  Alb  2.3<L>  /  TBili  0.6  /  DBili  0.3<H>  /  AST  20  /  ALT  <5<L>  /  AlkPhos  288<H>  01-28          MICROBIOLOGY:  v  .Blood Blood  01-24-20   No growth to date.  --  --      .Body Fluid RLQ Fluid Collection  01-24-20   Testing in progress  --  --      .Blood Blood  01-24-20   No growth to date.  --  --      .Body Fluid Abdominal Fluid  01-22-20   No growth at 5 days  --    polymorphonuclear leukocytes seen  Gram Negative Rods seen  by cytocentrifuge      .Blood Blood  01-21-20   No growth at 5 days.  --  --      .Blood Blood  01-21-20   No growth at 5 days.  --  --      .Sputum Sputum, trap  01-18-20   Normal Respiratory Gillian present  --    No polymorphonuclear leukocytes per low power field  Rare Squamous epithelial cells per low power field  Rare Gram positive cocci in pairs per oil power field      .Blood Blood  01-16-20   No growth at 5 days.  --  --      .Body Fluid Abdominal Fluid  01-15-20   No growth at 5 days  --    polymorphonuclear leukocytes seen  No organisms seen  by cytocentrifuge      .Tissue Other  01-10-20   No growth at 5 days  --  Enterobacter cloacae (Carbapenem Resistant)      .Blood Blood-Venous  01-07-20   No growth at 5 days.  --  --      .Urine Catheterized  01-07-20   <10,000 CFU/mL Normal Urogenital Gillian  --  --      Abdominal Fl Abdominal Fluid  01-06-20   No growth at 5 days  --    No polymorphonuclear leukocytes seen  No organisms seen  by cytocentrifuge      .Urine Clean Catch (Midstream)  01-05-20   >100,000 CFU/ml Enterobacter cloacae  --  Enterobacter cloacae      .Blood Blood  01-04-20   No growth at 5 days.  --  --      .Tissue Other  01-01-20   Numerous Enterobacter cloacae complex  --  Enterobacter cloacae complex      .Surgical Swab collection around right kidney  01-01-20   Moderate Enterobacter cloacae complex  --  Enterobacter cloacae complex      .Body Fluid periphrenic collection  01-01-20   **Please Note**: This is a Corrected Report**  Moderate Enterobacter cloacae complex  Moderate Escherichia coli  Previously reported as:  Moderate Enterobacter cloacae (Carbapenem Resistant)  Moderate Escherichia coli  --  Escherichia coli  Enterobacter cloacae complex      .Surgical Swab right kidney abscess  01-01-20   Moderate Enterobacter cloacae complex  --  Enterobacter cloacae complex      .Urine Clean Catch (Midstream)  12-29-19   >100,000 CFU/ml Enterobacter cloacae  --  Enterobacter cloacae          CMVPCR Log: NotDetec [id number]/mL (01-26 @ 12:50)  Rapid RVP Result: NotDetec (01-23 @ 21:40)    RADIOLOGY:    <The imaging below has been reviewed and visualized me independently>    EXAM:  XR CHEST PORTABLE ROUTINE 1V                        PROCEDURE DATE:  01/25/2020    Clear lungs. No pleural effusion or pneumothorax.  The cardiomediastinal silhouette is normal in size and contour. Old fracture deformity of the left clavicle.

## 2020-01-28 NOTE — PROGRESS NOTE ADULT - SUBJECTIVE AND OBJECTIVE BOX
No complaints    Vital Signs Last 24 Hrs  T(C): 37.4 (01-28-20 @ 17:00), Max: 37.5 (01-27-20 @ 20:40)  T(F): 99.3 (01-28-20 @ 17:00), Max: 99.5 (01-27-20 @ 20:40)  HR: 72 (01-28-20 @ 17:00) (69 - 82)  BP: 135/74 (01-28-20 @ 17:00) (111/64 - 140/84)  RR: 18 (01-28-20 @ 17:00) (18 - 18)  SpO2: 97% (01-28-20 @ 17:00) (95% - 100%)    Card S1S2  Lungs b/l air entry  Abd soft  Extr ++ edema b/l LE R > L, + edema RUE, AVF patent                                                                                                                                                                                                   7.1    15.35 )-----------( 878      ( 28 Jan 2020 06:20 )             23.9     28 Jan 2020 06:20    137    |  96     |  25     ----------------------------<  98     4.4     |  27     |  6.65     Ca    9.1        28 Jan 2020 06:20  Phos  3.3       28 Jan 2020 06:20  Mg     2.0       28 Jan 2020 06:20    TPro  6.2    /  Alb  2.3    /  TBili  0.6    /  DBili  0.3    /  AST  20     /  ALT  <5     /  AlkPhos  288    28 Jan 2020 06:20    LIVER FUNCTIONS - ( 28 Jan 2020 06:20 )  Alb: 2.3 g/dL / Pro: 6.2 g/dL / ALK PHOS: 288 U/L / ALT: <5 U/L / AST: 20 U/L / GGT: x           PT/INR - ( 28 Jan 2020 06:20 )   PT: 19.7 sec;   INR: 1.70 ratio      acetaminophen   Tablet .. 975 milliGRAM(s) Oral every 6 hours PRN  allopurinol 100 milliGRAM(s) Oral daily  aMIOdarone    Tablet 200 milliGRAM(s) Oral daily  apixaban 5 milliGRAM(s) Oral two times a day  aspirin  chewable 81 milliGRAM(s) Oral daily  brimonidine 0.2% Ophthalmic Solution 1 Drop(s) Both EYES three times a day  chlorhexidine 2% Cloths 1 Application(s) Topical <User Schedule>  epoetin trey Injectable 56557 Unit(s) IV Push <User Schedule>  gabapentin 100 milliGRAM(s) Oral every 12 hours  latanoprost 0.005% Ophthalmic Solution 1 Drop(s) Both EYES daily  meropenem  IVPB 500 milliGRAM(s) IV Intermittent every 12 hours  metoprolol tartrate 12.5 milliGRAM(s) Oral two times a day  micafungin IVPB 100 milliGRAM(s) IV Intermittent every 24 hours  Nephro-candace 1 Tablet(s) Oral daily  oxyCODONE    IR 10 milliGRAM(s) Oral every 6 hours PRN  oxyCODONE    IR 5 milliGRAM(s) Oral every 4 hours PRN    A/P:    Adm w/Flu A 12/27/19  Hx ESRD on HD, s/p DDRT 12/8/19, DGF  S/p transplant kidney bx 12/13: ATN, focal TMA  S/p PLEX 12/13, 12/15; d/c-d 12/20 w/op HD 3 x wk  Re-adm 12/27 w/infected carlos-nephric hematoma   S/p OR 1/1 for washout and repeat renal graft biopsy (c/w ATN)  Dx w/RLE DVT  Developed gross hematuria  S/p OR 1/10 for infected carlos-nephric hematoma, hemorrhage involving pseudo-aneurysm of anastomosis of renal artery to external iliac vein,   s/p transplant nephrectomy, s/p IVCF   S/p PEA arrest 1/13, intubated, + PE, R retrop collection  S/p IR drainage of abd collection  New RUE DVT  Abx per ID  HD TTS  Epogen w/HD 3 x week  Will eval for the need for extra UF in am

## 2020-01-28 NOTE — PROGRESS NOTE ADULT - SUBJECTIVE AND OBJECTIVE BOX
Transplant Surgery - Multidisciplinary Rounds  --------------------------------------------------------------  R DDRT    Date:  12/8/19     Present:   Patient seen and examined with Dr. Be, Dr. Cruz, Dr. Poe, nephrologist Dr. SHARLENE Puckett, Dr. Lowery, renal fellow, resident Maria Alejandra Harding, NP/MAEVE Hunt and Counts include 234 beds at the [facility] Transplant Coordinators: Kendal Osei ([place]), Social Work: Danisha Forman  Pharmacist: Debbi Gonzalez unit RN manager during am rounds. Disciplines not in attendance will be notified of the plan.       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 Young, followed by Dr. Raphael, [facility] Oncologist), RA with hand contractures. He underwent DDRT on 12/8/19. Post op course c/b DGF requiring HD, thrombocytopenia, elevated LDH and haptoglobin. Schistocytes  on peripheral smear concerning for TMA. Envarsus held. Received Belatacept 12/13. Started on PLEX (12/12, 12/15). Underwent renal bx on 12/13 c/w profound ATN.  Discharged home on 12/20/19 w/ outpatient HD.     Re-admitted 12/27 with influenza, completed treatement with Tamiflu on 12/31. Urine cx on admission grew Ent cloacae, was started on meropenem 12/31. Renal US on admission with increasing hematoma.   ·	OR 1/1 for ex-lap, hematoma evacuation, washout and renal bx. (c/w ATN)  OR cxs grew CRE and e coli. Post op course c/b:   ·	fevers and leukocytosis, CT a/p (1/4) with increased perinephric collection. Underwent IR guided collection 1/6   ·	New onset hematuria, martinez inserted 1/7, started CBI  ·	RLE edema, RLE doppler (1/8) with acute above the knee thrombus of R external iliac, common femoral and femoral veins, Acute calf vein thrombus affecting R soleal vein, Heparin gtt initiated   ·	AMS 1/9  (head CT neg), hyponatremic  ·	Significant hematuria and bleeding around the martinez. Angio 1/9 showed actively bleeding pseudoaneurysm at the anastomosis of the transplant renal artery with the recipient iliac artery. A stent was placed in the iliac artery to  control hemorrhage. IVC filter placed.   ·	Emergent OR 1/9-1/10 for graft nephrectomy;  ureter to bladder anastomosis was completely disrupted, repaired the R external iliac artery with bovine pericardial patch, repaired right external iliac vein, performed thrombectomy of RLE veins, Intra op received 3u PRBC.   ·	Extubated 1/11  ·	1/13 Saddle PE, with right heart strain and complicated by PEA arrest followed by Torsade, ROSC achieved after ACLS; re-intubated  ·	1/15 Renal US with 13.7 x 5.0 x 1.5 cm in RLQ transplant bed; s/p bedside IR guided drainage, pigtail in place. Fluid cx to date with NG  ·	1/21 CT C/A/P showing persistent collection, catheter at superior portion of collection  ·	1/22 IR pigtail drain repositioned and upsized to 12Fr catheter w/ increased output  ·	1/23 Transitioned to Eliquis from Heparin drip    Interval events:    -Afebrile, stable vitals.  -Received HD yesterday removed 2L.  -LE w/ minimal improvement in edema.   -wound vac changed yesterday.  -INR improved to 1.7 from 2.1. Plts increased to 878 from 853.   -Hemolysis work-up in progress, will f/u heme recs.  -Resumed on meropenem and micafungin.      cxs:   12/29: Urine Cx: Enterobacter Cloacae  1/1 OR Perinephric collection Cx:  Carbapenem resistant Ent Cloacae, E. Coli  1/1: R kidney abscess swab: Enterobacter Cloacae  1/1  OR tissue: Enterobacter Cloacae  1/5: Urine Cx: Enteropbacter Cloacae  1/5: Skin incision Cx (bedside): NG  1/6: Adominal fluid Cx from IR:  NG   1/7: Urine Cx:  NG  1/7: BCX2:  NG  1/10 retroperit hematoma - enerobacter   1/10 bladder hematoma - enterobacter  1/15 Drainage fluid - NGTD  1/16: BC X2: NGTD  1/18: SPutum: Normal harvey  1/21: BC X2: NTGD  1/22: IR body fluid Cx: GNR pre-milian    Potential Discharge date: pending clinical improvement      MEDICATIONS  (STANDING):  allopurinol 100 milliGRAM(s) Oral daily  aMIOdarone    Tablet 200 milliGRAM(s) Oral daily  apixaban 5 milliGRAM(s) Oral two times a day  aspirin  chewable 81 milliGRAM(s) Oral daily  brimonidine 0.2% Ophthalmic Solution 1 Drop(s) Both EYES three times a day  chlorhexidine 2% Cloths 1 Application(s) Topical <User Schedule>  epoetin trey Injectable 41187 Unit(s) IV Push <User Schedule>  gabapentin 100 milliGRAM(s) Oral every 12 hours  latanoprost 0.005% Ophthalmic Solution 1 Drop(s) Both EYES daily  meropenem  IVPB 500 milliGRAM(s) IV Intermittent every 12 hours  micafungin IVPB 100 milliGRAM(s) IV Intermittent every 24 hours  Nephro-candace 1 Tablet(s) Oral daily    MEDICATIONS  (PRN):  acetaminophen   Tablet .. 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  Carpal tunnel syndrome  Brain cyst: had MRI recently- now gone  Anemia  Gastric ulcer: Long time ago  Rheumatoid arthritis  Renal transplant recipient  Breakdown (mechanical) of penile (implanted) prosthesis, sequela  End-stage renal disease (ESRD): PERMACATH PLACEMENT  Abscess of left buttock: s/p I &amp; D  AV fistula: placement left lower arm  S/P cystoscopy: stent placement &amp; removal for kidney stones, lithiotripsy  s/p Lt Carpal tunnel: 2011      Vital Signs Last 24 Hrs  T(C): 37.2 (28 Jan 2020 09:00), Max: 37.6 (27 Jan 2020 13:44)  T(F): 98.9 (28 Jan 2020 09:00), Max: 99.6 (27 Jan 2020 13:44)  HR: 75 (28 Jan 2020 09:00) (69 - 82)  BP: 125/69 (28 Jan 2020 09:00) (111/64 - 138/75)  BP(mean): --  RR: 18 (28 Jan 2020 09:00) (18 - 18)  SpO2: 97% (28 Jan 2020 09:00) (94% - 100%)    I&O's Summary    27 Jan 2020 07:01  -  28 Jan 2020 07:00  --------------------------------------------------------  IN: 1090 mL / OUT: 2080 mL / NET: -990 mL    28 Jan 2020 07:01  -  28 Jan 2020 11:39  --------------------------------------------------------  IN: 240 mL / OUT: 25 mL / NET: 215 mL                              7.1    15.35 )-----------( 878      ( 28 Jan 2020 06:20 )             23.9     01-28    137  |  96  |  25<H>  ----------------------------<  98  4.4   |  27  |  6.65<H>    Ca    9.1      28 Jan 2020 06:20  Phos  3.3     01-28  Mg     2.0     01-28    TPro  6.2  /  Alb  2.3<L>  /  TBili  0.6  /  DBili  0.3<H>  /  AST  20  /  ALT  <5<L>  /  AlkPhos  288<H>  01-28          Culture - Blood (collected 01-24-20 @ 05:14)  Source: .Blood Blood  Preliminary Report (01-25-20 @ 06:01):    No growth to date.    Culture - Fungal, Body Fluid (collected 01-24-20 @ 02:22)  Source: .Body Fluid RLQ Fluid Collection  Preliminary Report (01-24-20 @ 15:47):    Testing in progress    Culture - Blood (collected 01-24-20 @ 01:59)  Source: .Blood Blood  Preliminary Report (01-25-20 @ 02:03):    No growth to date.    Culture - Body Fluid with Gram Stain (collected 01-22-20 @ 22:15)  Source: .Body Fluid Abdominal Fluid  Gram Stain (01-23-20 @ 03:51):    polymorphonuclear leukocytes seen    Gram Negative Rods seen    by cytocentrifuge  Final Report (01-27-20 @ 21:46):    No growth at 5 days      REVIEW OF SYSTEMS  Gen: + weakness   Skin: No rashes  Head/Eyes/Ears/Mouth: No headache; Normal hearing; Normal vision w/o blurriness; No sinus pain/discomfort, sore throat  Respiratory: no SOB  CV: No chest pain, PND, orthopnea  GI: incisional tenderness   : anuric  MSK: + lower extremity edema, RUE edema, flank edema  Neuro: No dizziness/lightheadedness, weakness, seizures, numbness, tingling  Heme: No easy bruising or bleeding  Endo: No heat/cold intolerance  Psych: No significant nervousness, anxiety, stress, depression    PHYSICAL EXAM:    Constitutional: NAD  	Eyes: Anicteric, PERRLA  	Respiratory: CTA b/l  	Cardiovascular: RRR  	Gastrointestinal: Soft abdomen, appropriate incisional TTP, ND.  wound vac ss/murky output (wound seen during recent wound vac change, good granulation tissue developing), RLQ drain with SS drainage    Genitourinary:  anuric    Extremities: b/l 2-3+ lower ext edema ace wrapped (slightly improved since yesterday); no ulcerations or skin breakdown, flank edema, RUE edema slow improvement   	Vascular: DP signal bilateral, L AVF palpable  	Neurological: A&O x3.  	Skin: no new lesions/ulcerations  	Musculoskeletal: Moving all extremities

## 2020-01-28 NOTE — PROGRESS NOTE ADULT - ATTENDING COMMENTS
Pt feeling better but still swollen  Going for dialysis and more UF today  May not remove all edema as he has DVT extending to IVC

## 2020-01-29 ENCOUNTER — APPOINTMENT (OUTPATIENT)
Dept: NEPHROLOGY | Facility: CLINIC | Age: 50
End: 2020-01-29

## 2020-01-29 LAB
CULTURE RESULTS: SIGNIFICANT CHANGE UP
CULTURE RESULTS: SIGNIFICANT CHANGE UP
SPECIMEN SOURCE: SIGNIFICANT CHANGE UP
SPECIMEN SOURCE: SIGNIFICANT CHANGE UP

## 2020-01-29 PROCEDURE — 99232 SBSQ HOSP IP/OBS MODERATE 35: CPT

## 2020-01-29 PROCEDURE — 99232 SBSQ HOSP IP/OBS MODERATE 35: CPT | Mod: GC

## 2020-01-29 RX ORDER — METOPROLOL TARTRATE 50 MG
12.5 TABLET ORAL
Refills: 0 | Status: DISCONTINUED | OUTPATIENT
Start: 2020-01-29 | End: 2020-02-01

## 2020-01-29 RX ADMIN — APIXABAN 5 MILLIGRAM(S): 2.5 TABLET, FILM COATED ORAL at 18:17

## 2020-01-29 RX ADMIN — OXYCODONE HYDROCHLORIDE 10 MILLIGRAM(S): 5 TABLET ORAL at 11:14

## 2020-01-29 RX ADMIN — APIXABAN 5 MILLIGRAM(S): 2.5 TABLET, FILM COATED ORAL at 05:45

## 2020-01-29 RX ADMIN — Medication 12.5 MILLIGRAM(S): at 22:44

## 2020-01-29 RX ADMIN — OXYCODONE HYDROCHLORIDE 5 MILLIGRAM(S): 5 TABLET ORAL at 22:44

## 2020-01-29 RX ADMIN — GABAPENTIN 100 MILLIGRAM(S): 400 CAPSULE ORAL at 18:18

## 2020-01-29 RX ADMIN — OXYCODONE HYDROCHLORIDE 10 MILLIGRAM(S): 5 TABLET ORAL at 20:30

## 2020-01-29 RX ADMIN — BRIMONIDINE TARTRATE 1 DROP(S): 2 SOLUTION/ DROPS OPHTHALMIC at 05:45

## 2020-01-29 RX ADMIN — OXYCODONE HYDROCHLORIDE 5 MILLIGRAM(S): 5 TABLET ORAL at 14:12

## 2020-01-29 RX ADMIN — GABAPENTIN 100 MILLIGRAM(S): 400 CAPSULE ORAL at 05:45

## 2020-01-29 RX ADMIN — MICAFUNGIN SODIUM 105 MILLIGRAM(S): 100 INJECTION, POWDER, LYOPHILIZED, FOR SOLUTION INTRAVENOUS at 14:05

## 2020-01-29 RX ADMIN — AMIODARONE HYDROCHLORIDE 200 MILLIGRAM(S): 400 TABLET ORAL at 05:45

## 2020-01-29 RX ADMIN — OXYCODONE HYDROCHLORIDE 5 MILLIGRAM(S): 5 TABLET ORAL at 23:15

## 2020-01-29 RX ADMIN — Medication 81 MILLIGRAM(S): at 12:26

## 2020-01-29 RX ADMIN — LATANOPROST 1 DROP(S): 0.05 SOLUTION/ DROPS OPHTHALMIC; TOPICAL at 12:26

## 2020-01-29 RX ADMIN — OXYCODONE HYDROCHLORIDE 10 MILLIGRAM(S): 5 TABLET ORAL at 10:44

## 2020-01-29 RX ADMIN — BRIMONIDINE TARTRATE 1 DROP(S): 2 SOLUTION/ DROPS OPHTHALMIC at 22:44

## 2020-01-29 RX ADMIN — BRIMONIDINE TARTRATE 1 DROP(S): 2 SOLUTION/ DROPS OPHTHALMIC at 14:02

## 2020-01-29 RX ADMIN — OXYCODONE HYDROCHLORIDE 5 MILLIGRAM(S): 5 TABLET ORAL at 14:42

## 2020-01-29 RX ADMIN — CHLORHEXIDINE GLUCONATE 1 APPLICATION(S): 213 SOLUTION TOPICAL at 05:46

## 2020-01-29 RX ADMIN — Medication 1 TABLET(S): at 12:26

## 2020-01-29 RX ADMIN — MEROPENEM 100 MILLIGRAM(S): 1 INJECTION INTRAVENOUS at 16:12

## 2020-01-29 RX ADMIN — OXYCODONE HYDROCHLORIDE 10 MILLIGRAM(S): 5 TABLET ORAL at 19:58

## 2020-01-29 RX ADMIN — Medication 100 MILLIGRAM(S): at 12:26

## 2020-01-29 RX ADMIN — MEROPENEM 100 MILLIGRAM(S): 1 INJECTION INTRAVENOUS at 02:41

## 2020-01-29 NOTE — PROGRESS NOTE ADULT - ASSESSMENT
49M PMHx of ESRD on HD  ( via LUE AVF), HTN, Gout, Glaucoma, NET of pancreas, RA with hand contractures. s/p DDRT 12/8/19,  Post op course c/b DGF requiring HD. Renal bx x 2 c/w profound ATN.     Re-admitted with Influenza and perinephric hematoma. s/p hematoma evacuation/ abd washout and renal biopsy. Post op course c/b leukocytosis, infected hematoma, acute RLE DVT, and bleeding pseudoaneurysm at txp renal artery anastomosis. Now s/p graft nephrectomy. Course further complicated by saddle PE causing right heart strain and hemodynamic instability leading to cardiac arrest requiring ACLS, Required IR drainage of post-op collections x2. Transferred to floor on 1/25/2020.     [] DDRT c/b DGF/ATN/perinephric infected hematoma/pseudoneurysm/graft nephrectomy   - HD per nephrology team. plan for  additional sessions this week to help with edema.   - S/P HD yesterday with no removal due to low BP, will plan for holding BB on HD days  - s/p bedside IR drainage of RLQ fluid collection (1/15), drain repositioned 1/22 and cultures pending result this far negative  - FU final IR culture, fungal culture from 1/22- pending.   - Repeat chest/abd/pelvis CT 1/28- noted with continued resolution of right lower quadrant fluid collection. Persistent bilateral lower lobe pulmonary emboli.  - ID following;  cont Meropenem, Micafungin 100mg daily.   ID plan for discharge on cipro.  - pain control  - bowel regimen  - PT/OT/OOB.  VAC change per PT  - Keep B/L LE bandaged with ace wrap and skin care      [] Saddle PE, with right heart strain and complicated by PEA arrest followed by Torsade, ROSC achieved after ACLS  - vascular, CT and vascular cardiology following   -Repeat chest CT 1/28 noted Persistent bilateral lower lobe pulmonary emboli.   - CT 1/21: There are pulmonary emboli within the right lower lobar arteries and the left lower lobar arteries extending into the distal segments as seen on prior study. The pulmonary emboli seen in the right and left pulmonary arteries are normal longer visualized. New distal to mid brachial DVT noted  1/19 Duplex: RUE Deep vein thrombosis in the mid to distal brachial vein.  - Continue Eliquis to 5mg PO BID   - continue ASA  - repeat ECHO with improved R heart strain    [] New Afib/Vtech  Spoke with cardiology who recommends for BB low dose in the setting of WCT and agreed with holding BB on HD days  Continue amiodarone, card plan to continue for couple of months and will f/u as outpt    [] New acute DVT of R distal brachia vein   - continue anticoagulation     [] New acute DVT of R external iliac, common femoral, femoral veins, R soleal vein requiring heparin drip  - IVC filter placed 1/9, continue anticoagulation     [] Thrombocytosis  - rising INR/PLT counts over the past week.   -Heme consulted; likely reactive. Heme work up in progress    [] Dispo  - continue tele  -Pt to reevaluate for possible acute rehab once medically stable.

## 2020-01-29 NOTE — PROGRESS NOTE ADULT - SUBJECTIVE AND OBJECTIVE BOX
[facility] -- Melissa Kelsey, Maximino Nunez Pannella, Patel, Savella  Office # [phone]      Follow Up:  AF, arrest    Subjective/Observations:   Patient seen and examined. Events noted. Resting comfortably in bed. No complaints of chest pain, dyspnea, or palpitations reported. No signs of orthopnea or PND.   BP last night at HD was low and no vol removed.     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  Carpal tunnel syndrome  Brain cyst: had MRI recently- now gone  Anemia  Gastric ulcer: Long time ago  Rheumatoid arthritis  Renal transplant recipient  Breakdown (mechanical) of penile (implanted) prosthesis, sequela  End-stage renal disease (ESRD): PERMACATH PLACEMENT  Abscess of left buttock: s/p I &amp; D  AV fistula: placement left lower arm  S/P cystoscopy: stent placement &amp; removal for kidney stones, lithiotripsy  s/p Lt Carpal tunnel: 2011      MEDICATIONS  (STANDING):  allopurinol 100 milliGRAM(s) Oral daily  aMIOdarone    Tablet 200 milliGRAM(s) Oral daily  apixaban 5 milliGRAM(s) Oral two times a day  aspirin  chewable 81 milliGRAM(s) Oral daily  brimonidine 0.2% Ophthalmic Solution 1 Drop(s) Both EYES three times a day  chlorhexidine 2% Cloths 1 Application(s) Topical <User Schedule>  epoetin trey Injectable 30724 Unit(s) IV Push <User Schedule>  gabapentin 100 milliGRAM(s) Oral every 12 hours  latanoprost 0.005% Ophthalmic Solution 1 Drop(s) Both EYES daily  meropenem  IVPB 500 milliGRAM(s) IV Intermittent every 12 hours  micafungin IVPB 100 milliGRAM(s) IV Intermittent every 24 hours  Nephro-candace 1 Tablet(s) Oral daily    MEDICATIONS  (PRN):  acetaminophen   Tablet .. 975 milliGRAM(s) Oral every 6 hours PRN Mild Pain (1 - 3)  oxyCODONE    IR 10 milliGRAM(s) Oral every 6 hours PRN Severe Pain (7 - 10)  oxyCODONE    IR 5 milliGRAM(s) Oral every 4 hours PRN Moderate Pain (4 - 6)      Allergies    coconut (Anaphylaxis)  morphine (Pruritus; Rash)    Intolerances            Vital Signs Last 24 Hrs  T(C): 37.4 (29 Jan 2020 09:00), Max: 37.4 (28 Jan 2020 13:00)  T(F): 99.3 (29 Jan 2020 09:00), Max: 99.3 (28 Jan 2020 13:00)  HR: 81 (29 Jan 2020 09:00) (63 - 81)  BP: 106/67 (29 Jan 2020 09:00) (95/51 - 142/77)  BP(mean): --  RR: 18 (29 Jan 2020 09:00) (18 - 18)  SpO2: 98% (29 Jan 2020 09:00) (96% - 99%)    I&O's Summary    28 Jan 2020 07:01  -  29 Jan 2020 07:00  --------------------------------------------------------  IN: 2160 mL / OUT: 985 mL / NET: 1175 mL          PHYSICAL EXAM:  TELE:   Constitutional: NAD, awake    HEENT: Moist Mucous Membranes, Anicteric  Pulmonary: Decreased breath sounds b/l. No rales, crackles or wheeze appreciated.   Cardiovascular: Regular, S1 and S2, No murmurs, rubs, gallops or clicks  Gastrointestinal: Bowel Sounds present, soft, nontender.   Lymph: dependent peripheral edema. No lymphadenopathy.  Skin: No visible rashes or ulcers.  Psych:  Mood & affect appropriate for situation    LABS: All Labs Reviewed:                        7.1    15.35 )-----------( 878      ( 28 Jan 2020 06:20 )             23.9                         7.5    15.72 )-----------( 853      ( 27 Jan 2020 06:42 )             24.5     28 Jan 2020 06:20    137    |  96     |  25     ----------------------------<  98     4.4     |  27     |  6.65   27 Jan 2020 06:39    135    |  95     |  18     ----------------------------<  94     4.5     |  25     |  5.42     Ca    9.1        28 Jan 2020 06:20  Ca    8.7        27 Jan 2020 06:39  Phos  3.3       28 Jan 2020 06:20  Phos  2.8       27 Jan 2020 06:39  Mg     2.0       28 Jan 2020 06:20  Mg     2.0       27 Jan 2020 06:39    TPro  6.2    /  Alb  2.3    /  TBili  0.6    /  DBili  0.3    /  AST  20     /  ALT  <5     /  AlkPhos  288    28 Jan 2020 06:20  TPro  5.8    /  Alb  2.0    /  TBili  0.7    /  DBili  0.4    /  AST  25     /  ALT  <5     /  AlkPhos  292    27 Jan 2020 06:39    PT/INR - ( 28 Jan 2020 06:20 )   PT: 19.7 sec;   INR: 1.70 ratio         PTT - ( 28 Jan 2020 06:20 )  PTT:43.2 sec

## 2020-01-29 NOTE — PROGRESS NOTE ADULT - SUBJECTIVE AND OBJECTIVE BOX
Transplant Surgery - Multidisciplinary Rounds  --------------------------------------------------------------  R DDRT    Date:  12/8/19     Present:   Patient seen and examined with Dr. Be, Dr. Espinal, nephrologist Dr. SHARLENE Puckett, renal fellow, PGY 3 resident Maria Alejandra Harding, NP: Denis Aviles Pharmacist: Debbi Ortiz unit RN manager during am rounds. Disciplines not in attendance will be notified of the plan.       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 Young, followed by Dr. Raphael, [facility] Oncologist), RA with hand contractures. He underwent DDRT on 12/8/19. Post op course c/b DGF requiring HD, thrombocytopenia, elevated LDH and haptoglobin. Schistocytes  on peripheral smear concerning for TMA. Envarsus held. Received Belatacept 12/13. Started on PLEX (12/12, 12/15). Underwent renal bx on 12/13 c/w profound ATN.  Discharged home on 12/20/19 w/ outpatient HD.     Re-admitted 12/27 with influenza, completed treatement with Tamiflu on 12/31. Urine cx on admission grew Ent cloacae, was started on meropenem 12/31. Renal US on admission with increasing hematoma.   ·	OR 1/1 for ex-lap, hematoma evacuation, washout and renal bx. (c/w ATN)  OR cxs grew CRE and e coli. Post op course c/b:   ·	fevers and leukocytosis, CT a/p (1/4) with increased perinephric collection. Underwent IR guided collection 1/6   ·	New onset hematuria, martinez inserted 1/7, started CBI  ·	RLE edema, RLE doppler (1/8) with acute above the knee thrombus of R external iliac, common femoral and femoral veins, Acute calf vein thrombus affecting R soleal vein, Heparin gtt initiated   ·	AMS 1/9  (head CT neg), hyponatremic  ·	Significant hematuria and bleeding around the martinez. Angio 1/9 showed actively bleeding pseudoaneurysm at the anastomosis of the transplant renal artery with the recipient iliac artery. A stent was placed in the iliac artery to  control hemorrhage. IVC filter placed.   ·	Emergent OR 1/9-1/10 for graft nephrectomy;  ureter to bladder anastomosis was completely disrupted, repaired the R external iliac artery with bovine pericardial patch, repaired right external iliac vein, performed thrombectomy of RLE veins, Intra op received 3u PRBC.   ·	Extubated 1/11  ·	1/13 Saddle PE, with right heart strain and complicated by PEA arrest followed by Torsade, ROSC achieved after ACLS; re-intubated  ·	1/15 Renal US with 13.7 x 5.0 x 1.5 cm in RLQ transplant bed; s/p bedside IR guided drainage, pigtail in place. Fluid cx to date with NG  ·	1/21 CT C/A/P showing persistent collection, catheter at superior portion of collection  ·	1/22 IR pigtail drain repositioned and upsized to 12Fr catheter w/ increased output  ·	1/23 Transitioned to Eliquis from Heparin drip    Interval events:    -Afebrile, stable vitals.  -Received HD last night was unable to remove due to low BP.  -LE w/ minimal improvement in edema.   -wound vac change 3xweek.  - Plts remains elevated Heme following   -Hemolysis work-up in progress, will f/u heme recs.  -On meropenem and micafungin.      cxs:   12/29: Urine Cx: Enterobacter Cloacae  1/1 OR Perinephric collection Cx:  Carbapenem resistant Ent Cloacae, E. Coli  1/1: R kidney abscess swab: Enterobacter Cloacae  1/1  OR tissue: Enterobacter Cloacae  1/5: Urine Cx: Enteropbacter Cloacae  1/5: Skin incision Cx (bedside): NG  1/6: Adominal fluid Cx from IR:  NG   1/7: Urine Cx:  NG  1/7: BCX2:  NG  1/10 retroperit hematoma - enerobacter   1/10 bladder hematoma - enterobacter  1/15 Drainage fluid - NGTD  1/16: BC X2: NGTD  1/18: SPutum: Normal harvey  1/21: BC X2: NTGD  1/22: IR body fluid Cx: GNR pre-milian    Potential Discharge date: pending clinical improvement     MEDICATIONS  (STANDING):  allopurinol 100 milliGRAM(s) Oral daily  aMIOdarone    Tablet 200 milliGRAM(s) Oral daily  apixaban 5 milliGRAM(s) Oral two times a day  aspirin  chewable 81 milliGRAM(s) Oral daily  brimonidine 0.2% Ophthalmic Solution 1 Drop(s) Both EYES three times a day  chlorhexidine 2% Cloths 1 Application(s) Topical <User Schedule>  epoetin trey Injectable 07378 Unit(s) IV Push <User Schedule>  gabapentin 100 milliGRAM(s) Oral every 12 hours  latanoprost 0.005% Ophthalmic Solution 1 Drop(s) Both EYES daily  meropenem  IVPB 500 milliGRAM(s) IV Intermittent every 12 hours  metoprolol tartrate 12.5 milliGRAM(s) Oral two times a day  micafungin IVPB 100 milliGRAM(s) IV Intermittent every 24 hours  Nephro-candace 1 Tablet(s) Oral daily    MEDICATIONS  (PRN):  acetaminophen   Tablet .. 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  Carpal tunnel syndrome  Brain cyst: had MRI recently- now gone  Anemia  Gastric ulcer: Long time ago  Rheumatoid arthritis  Renal transplant recipient  Breakdown (mechanical) of penile (implanted) prosthesis, sequela  End-stage renal disease (ESRD): PERMACATH PLACEMENT  Abscess of left buttock: s/p I &amp; D  AV fistula: placement left lower arm  S/P cystoscopy: stent placement &amp; removal for kidney stones, lithiotripsy  s/p Lt Carpal tunnel: 2011      Vital Signs Last 24 Hrs  T(C): 37.4 (29 Jan 2020 09:00), Max: 37.4 (28 Jan 2020 13:00)  T(F): 99.3 (29 Jan 2020 09:00), Max: 99.3 (28 Jan 2020 13:00)  HR: 71 (29 Jan 2020 10:43) (63 - 81)  BP: 113/69 (29 Jan 2020 10:43) (95/51 - 142/77)  BP(mean): --  RR: 18 (29 Jan 2020 09:00) (18 - 18)  SpO2: 98% (29 Jan 2020 09:00) (96% - 99%)    I&O's Summary    28 Jan 2020 07:01  -  29 Jan 2020 07:00  --------------------------------------------------------  IN: 2160 mL / OUT: 985 mL / NET: 1175 mL                              7.1    15.35 )-----------( 878      ( 28 Jan 2020 06:20 )             23.9     01-28    137  |  96  |  25<H>  ----------------------------<  98  4.4   |  27  |  6.65<H>    Ca    9.1      28 Jan 2020 06:20  Phos  3.3     01-28  Mg     2.0     01-28    TPro  6.2  /  Alb  2.3<L>  /  TBili  0.6  /  DBili  0.3<H>  /  AST  20  /  ALT  <5<L>  /  AlkPhos  288<H>  01-28          Culture - Blood (collected 01-24-20 @ 05:14)  Source: .Blood Blood  Final Report (01-29-20 @ 06:00):    No growth at 5 days.    Culture - Fungal, Body Fluid (collected 01-24-20 @ 02:22)  Source: .Body Fluid RLQ Fluid Collection  Preliminary Report (01-24-20 @ 15:47):    Testing in progress    Culture - Blood (collected 01-24-20 @ 01:59)  Source: .Blood Blood  Final Report (01-29-20 @ 02:01):    No growth at 5 days.    Culture - Body Fluid with Gram Stain (collected 01-22-20 @ 22:15)  Source: .Body Fluid Abdominal Fluid  Gram Stain (01-23-20 @ 03:51):    polymorphonuclear leukocytes seen    Gram Negative Rods seen    by cytocentrifuge  Final Report (01-27-20 @ 21:46):    No growth at 5 days    REVIEW OF SYSTEMS  Gen: + weakness   Skin: No rashes  Head/Eyes/Ears/Mouth: No headache; Normal hearing; Normal vision w/o blurriness; No sinus pain/discomfort, sore throat  Respiratory: no SOB  CV: No chest pain, PND, orthopnea  GI: incisional tenderness   : anuric  MSK: + lower extremity edema, RUE edema, flank edema  Neuro: No dizziness/lightheadedness, weakness, seizures, numbness, tingling  Heme: No easy bruising or bleeding  Endo: No heat/cold intolerance  Psych: No significant nervousness, anxiety, stress, depression    PHYSICAL EXAM:    Constitutional: NAD  	Eyes: Anicteric, PERRLA  	Respiratory: CTA b/l  	Cardiovascular: RRR  	Gastrointestinal: Soft abdomen, appropriate incisional TTP, ND.  wound vac ss/murky output (wound seen during recent wound vac change, good granulation tissue developing), RLQ drain with SS drainage    Genitourinary:  anuric    Extremities: b/l 2-3+ lower ext edema ace wrapped (slightly improved since yesterday); no ulcerations or skin breakdown, flank edema, RUE edema slow improvement   	Vascular: DP signal bilateral, L AVF palpable  	Neurological: A&O x3.  	Skin: no new lesions/ulcerations  	Musculoskeletal: Moving all extremities

## 2020-01-29 NOTE — PROGRESS NOTE ADULT - PROBLEM SELECTOR PLAN 5
Hb is 7.1 today, relatively stable. Continue Epogen with dialysis. Pt. also with iron deficiency. May start iron after potential infection completely treated. Will transfuse if Hb<7

## 2020-01-29 NOTE — PROGRESS NOTE ADULT - SUBJECTIVE AND OBJECTIVE BOX
Follow Up:  infected hematoma    Interval History: no chills or fevers. no n/v/d. stable abdominal pain    REVIEW OF SYSTEMS  [  ] ROS unobtainable because:    [ x ] All other systems negative except as noted below    Constitutional:  [ ] fever [ ] chills  [ ] weight loss  [ ] weakness  Skin:  [ ] rash [ ] phlebitis	  Eyes: [ ] icterus [ ] pain  [ ] discharge	  ENMT: [ ] sore throat  [ ] thrush [ ] ulcers [ ] exudates  Respiratory: [ ] dyspnea [ ] hemoptysis [ ] cough [ ] sputum	  Cardiovascular:  [ ] chest pain [ ] palpitations [ ] edema	  Gastrointestinal:  [ ] nausea [ ] vomiting [ ] diarrhea [ ] constipation [ ] pain	  Genitourinary:  [ ] dysuria [ ] frequency [ ] hematuria [ ] discharge [ ] flank pain  [ ] incontinence  Musculoskeletal:  [ ] myalgias [ ] arthralgias [ ] arthritis  [ ] back pain  Neurological:  [ ] headache [ ] seizures  [ ] confusion/altered mental status      Allergies  coconut (Anaphylaxis)  morphine (Pruritus; Rash)        ANTIMICROBIALS:  meropenem  IVPB 500 every 12 hours  micafungin IVPB 100 every 24 hours      OTHER MEDS:  MEDICATIONS  (STANDING):  acetaminophen   Tablet .. 975 every 6 hours PRN  allopurinol 100 daily  aMIOdarone    Tablet 200 daily  apixaban 5 two times a day  aspirin  chewable 81 daily  epoetin trey Injectable 23465 <User Schedule>  gabapentin 100 every 12 hours  metoprolol tartrate 12.5 two times a day  oxyCODONE    IR 10 every 6 hours PRN  oxyCODONE    IR 5 every 4 hours PRN      Vital Signs Last 24 Hrs  T(C): 37.1 (29 Jan 2020 17:00), Max: 37.4 (29 Jan 2020 09:00)  T(F): 98.8 (29 Jan 2020 17:00), Max: 99.3 (29 Jan 2020 09:00)  HR: 67 (29 Jan 2020 17:00) (63 - 81)  BP: 103/64 (29 Jan 2020 17:00) (95/51 - 142/77)  BP(mean): --  RR: 18 (29 Jan 2020 17:00) (18 - 20)  SpO2: 99% (29 Jan 2020 17:00) (96% - 99%)    PHYSICAL EXAMINATION:  General: Alert and Awake, NAD  HEENT: PERRL, EOMI  Neck: Supple  Cardiac: RRR, No M/R/G  Resp: CTAB, No Wh/Rh/Ra  Abdomen: +RLQ drain with serosanguinous drainage. +RLQ wound vac over transplant nephrectomy site, NBS, mild tenderness to palpation over RLQ  MSK: 1-2+ RLE edema. No Calf tenderness  Skin: No rashes or lesions. Skin is warm and dry to the touch.   Neuro: Alert and Awake. CN 2-12 Grossly intact. Moves all four extremities spontaneously.  Psych: Calm, Pleasant, Cooperative                                     7.1    15.35 )-----------( 878      ( 28 Jan 2020 06:20 )             23.9       01-28    137  |  96  |  25<H>  ----------------------------<  98  4.4   |  27  |  6.65<H>    Ca    9.1      28 Jan 2020 06:20  Phos  3.3     01-28  Mg     2.0     01-28    TPro  6.2  /  Alb  2.3<L>  /  TBili  0.6  /  DBili  0.3<H>  /  AST  20  /  ALT  <5<L>  /  AlkPhos  288<H>  01-28          MICROBIOLOGY:  v  .Blood Blood  01-24-20   No growth at 5 days.  --  --      .Body Fluid RLQ Fluid Collection  01-24-20   Testing in progress  --  --      .Blood Blood  01-24-20   No growth at 5 days.  --  --      .Body Fluid Abdominal Fluid  01-22-20   No growth at 5 days  --    polymorphonuclear leukocytes seen  Gram Negative Rods seen  by cytocentrifuge      .Blood Blood  01-21-20   No growth at 5 days.  --  --      .Blood Blood  01-21-20   No growth at 5 days.  --  --      .Sputum Sputum, trap  01-18-20   Normal Respiratory Gillian present  --    No polymorphonuclear leukocytes per low power field  Rare Squamous epithelial cells per low power field  Rare Gram positive cocci in pairs per oil power field      .Blood Blood  01-16-20   No growth at 5 days.  --  --      .Body Fluid Abdominal Fluid  01-15-20   No growth at 5 days  --    polymorphonuclear leukocytes seen  No organisms seen  by cytocentrifuge      .Tissue Other  01-10-20   No growth at 5 days  --  Enterobacter cloacae (Carbapenem Resistant)      .Blood Blood-Venous  01-07-20   No growth at 5 days.  --  --      .Urine Catheterized  01-07-20   <10,000 CFU/mL Normal Urogenital Gillian  --  --      Abdominal Fl Abdominal Fluid  01-06-20   No growth at 5 days  --    No polymorphonuclear leukocytes seen  No organisms seen  by cytocentrifuge      .Urine Clean Catch (Midstream)  01-05-20   >100,000 CFU/ml Enterobacter cloacae  --  Enterobacter cloacae      .Blood Blood  01-04-20   No growth at 5 days.  --  --      .Tissue Other  01-01-20   Numerous Enterobacter cloacae complex  --  Enterobacter cloacae complex      .Surgical Swab collection around right kidney  01-01-20   Moderate Enterobacter cloacae complex  --  Enterobacter cloacae complex      .Body Fluid periphrenic collection  01-01-20   **Please Note**: This is a Corrected Report**  Moderate Enterobacter cloacae complex  Moderate Escherichia coli  Previously reported as:  Moderate Enterobacter cloacae (Carbapenem Resistant)  Moderate Escherichia coli  --  Escherichia coli  Enterobacter cloacae complex      .Surgical Swab right kidney abscess  01-01-20   Moderate Enterobacter cloacae complex  --  Enterobacter cloacae complex          CMVPCR Log: NotDetec Vsu71MP/mL (01-26 @ 12:50)  Rapid RVP Result: NotDetec (01-23 @ 21:40)        RADIOLOGY:    <The imaging below has been reviewed and visualized me independently>    EXAM:  CT ABDOMEN AND PELVIS IC                        EXAM:  CT CHEST IC                        PROCEDURE DATE:  01/28/2020    Continued resolution of right lower quadrant fluid collection.  Persistent bilateral lower lobe pulmonary emboli.  Anasarca.

## 2020-01-29 NOTE — PROGRESS NOTE ADULT - ASSESSMENT
49 year old male with ESRD on HD, now s/p ddrt on 12/8/2019 course complicated by TMA/profound ATN, who presents from a HD center with a fever and cough. He was initially admitted with influenza. He is s/p washout of infected collection and biopsy which revealed ATN.  He was found to have diffusely enlarging perinephric fluid collection.    While on telemetry, he had an episode of a wide complex tachycardia. It initially appears irregular, suggesting an atrial arrhythmia with aberrancy, though the remainder appears more like an episode of non-sustained VT.   s/p washout on 1/1/2020 with worsening sepsis and transfer to ICU, s/p IR drainage of perinephric collection on 1/6. 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 He underwent operative repair of right external iliac artery with pericardial patch, transplant nephrectomy, RLE thrombectomy, and IVC filter on 1/9.     s/p corrina ->tachy arrest with tdp, af rvr, now on amio, with large PE    - developed malignant arrhythmias which seemed to start with bradycardia, leading to more tachycardia and bursts of VT and rapid AF.  The apparent af degenerated into torsades.  He was shocked and has been maintaining SR since  - cont amio at 200 daily. No further events on telemetry  - He had a low BP last night. I would resume Metoprolol 12.5mg q12 on only nonHD days and see how bp reacts on HD.   - repeat ekg daily to evaluate qtc, while on antifungals/amiodarone  - arrhythmias were most likely triggered by the massive vte event, are unlikely to represent a primary cardiac issue  - hs trop noted, though in the context of his arrhythmias, and right heart strain from the pe, the trop is more likely to reflect demand than an acs event  - cont asa     - now on apixiban  - echo with preserved/hyperdynamic lv, with RV failure consistent with acute RV overload  - CTPA on 1/21 demonstrated some resolution of PE and evolving pulmonary infarcts.   - Further cardiac workup will depend on clinical course.  - will follow with you

## 2020-01-29 NOTE — PROGRESS NOTE ADULT - ATTENDING COMMENTS
Pt feeling better but still swollen.  Did not tolerate UF yesterday due to hypotension  May not remove all edema as he has DVT extending to IVC  On Eliquis for DVT  will start planning for d/c to rehab.

## 2020-01-29 NOTE — PROGRESS NOTE ADULT - SUBJECTIVE AND OBJECTIVE BOX
[facility] DIVISION OF KIDNEY DISEASES AND HYPERTENSION -- FOLLOW UP NOTE  --------------------------------------------------------------------------------  HPI: 49 yr old man with ESRD s/p DDRT in Dec 2019 complicated by DGF due to ATN and early TMA thought to be due to prograf. Was on belatacept/MMF/Pred immunosuppression. He was admitted with influenza, course complicated by perinephric hematoma complicated by enterobacter infection s/p surgical exploration and wash out. He developed DVT started on heparin drip, subsequent gross hematuria. He was taken to the OR and found to have dehiscence of the ureter-bladder anastomosis as well as vascular anastomosis. He underwent graft nephrectomy on 1/10/20 with bovine biopatch repair of the EIA. On 1/13/20 he had cardiac arrest due to large pulmonary embolism s/p successful resuscitation. S/p bedside drainage of RLQ collection on 1/15/20 and upsized drain on 1/22/20. Pt. transferred to medical floor and has remained stable. Noted to have excess body weight. Had HD treatment yesterday, however without UF d/t hypotension.    Pt. seen and examined at bedside this AM. Pt. feels improved daily.    PAST HISTORY  --------------------------------------------------------------------------------  No significant changes to PMH, PSH, FHx, SHx, unless otherwise noted    ALLERGIES & MEDICATIONS  --------------------------------------------------------------------------------  Allergies    coconut (Anaphylaxis)  morphine (Pruritus; Rash)    Intolerances    Standing Inpatient Medications  allopurinol 100 milliGRAM(s) Oral daily  aMIOdarone    Tablet 200 milliGRAM(s) Oral daily  apixaban 5 milliGRAM(s) Oral two times a day  aspirin  chewable 81 milliGRAM(s) Oral daily  brimonidine 0.2% Ophthalmic Solution 1 Drop(s) Both EYES three times a day  chlorhexidine 2% Cloths 1 Application(s) Topical <User Schedule>  epoetin trey Injectable 59250 Unit(s) IV Push <User Schedule>  gabapentin 100 milliGRAM(s) Oral every 12 hours  latanoprost 0.005% Ophthalmic Solution 1 Drop(s) Both EYES daily  meropenem  IVPB 500 milliGRAM(s) IV Intermittent every 12 hours  metoprolol tartrate 12.5 milliGRAM(s) Oral two times a day  micafungin IVPB 100 milliGRAM(s) IV Intermittent every 24 hours  Nephro-candace 1 Tablet(s) Oral daily    REVIEW OF SYSTEMS  --------------------------------------------------------------------------------  Gen: No lethargy  Respiratory: No dyspnea  CV: No chest pain  GI: No abdominal pain  MSK: + LE edema  Neuro: No dizziness  Heme: No bleeding    All other systems were reviewed and are negative, except as noted.    VITALS/PHYSICAL EXAM  --------------------------------------------------------------------------------  T(C): 37.4 (01-29-20 @ 09:00), Max: 37.4 (01-28-20 @ 13:00)  HR: 71 (01-29-20 @ 10:43) (63 - 81)  BP: 113/69 (01-29-20 @ 10:43) (95/51 - 142/77)  RR: 18 (01-29-20 @ 09:00) (18 - 18)  SpO2: 98% (01-29-20 @ 09:00) (96% - 99%)  Wt(kg): --    01-28-20 @ 07:01  -  01-29-20 @ 07:00  --------------------------------------------------------  IN: 2160 mL / OUT: 985 mL / NET: 1175 mL    01-29-20 @ 07:01  -  01-29-20 @ 12:56  --------------------------------------------------------  IN: 300 mL / OUT: 25 mL / NET: 275 mL    Physical Exam:  	Gen: NAD, resting  	HEENT: supple neck  	Pulm: decreased breath sounds bilaterally  	CV: RRR, S1S2; no rub  	Abd: +BS, soft, RLQ with drain in place  	: scrotal edema  	UE: RUE swelling  	LE: b/l pitting edema, R>L (improved)    LABS/STUDIES  --------------------------------------------------------------------------------              7.1    15.35 >-----------<  878      [01-28-20 @ 06:20]              23.9     137  |  96  |  25  ----------------------------<  98      [01-28-20 @ 06:20]  4.4   |  27  |  6.65        Ca     9.1     [01-28-20 @ 06:20]      Mg     2.0     [01-28-20 @ 06:20]      Phos  3.3     [01-28-20 @ 06:20]    Creatinine Trend:  SCr 6.65 [01-28 @ 06:20]  SCr 5.42 [01-27 @ 06:39]  SCr 4.16 [01-26 @ 06:07]  SCr 5.90 [01-25 @ 06:13]  SCr 4.59 [01-24 @ 01:39]

## 2020-01-29 NOTE — PROGRESS NOTE ADULT - ASSESSMENT
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 RVP with Influenza A s/p Tamiflu treatment course. Now with infected carlos-transplant hematoma.    1/1: s/p Ex-Lap and washout of infected hematoma (Enterobacter from cultures)  1/6: s/p IR guided drainage attempt of hematoma on 1/6 (NGTD from cultures)  1/10: s/p transplant nephrectomy, repair of right external iliac artery with a bovine pericardial patch, repair of right external iliac vein and thrombectomy of right lower extremity veins (Surgical cultures with Enterobacter)  1/15: s/p IR guided drainage (220cc of hematoma)  1/22: s/p IR drain repositioning and upsizing (Gram stain with GNR but so far NGTD)    PEA arrest and Torsades on 1/13  CT C/A/P (1/13) with Saddle PE, interval increase in size of RP fluid collection (infected hematoma)  CT C/A/P (1/21) with pulmonary infarcts and residual hematoma (decreased in size)    Some of the Enterobacter isolates R and S to Ertapenem  per core lab Ertapenem at borderline of Susceptible and Resistant breakpoint  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   Given concern for intravascular infection I would treat for 6 weeks from 1/10 nephrectomy (End Date: 2/20/20)  I would continue Meropenem while admitted and switch to Ciprofloxacin on discharge    Fevers resolved, leukocytosis stable and procalcitonin decreasing.   Fevers & leukocytosis likely secondary to either pulmonary infarcts or from residual hematoma/collection.   Fluid sampling from 1/22 drain repositioning / upsize with GNR on gram stain.   So far no growth to date. Anticipate Enterobacter if there is growth    Repeat CT C/A/P (1/28) with interval decrease in size of collections.     Overall, Leukocytosis (stable), Infected Hematoma, Saddle pulmonary embolus, Renal Transplant Recipient    --Continue Meropenem 500 mg IV Q24H (anticipate switch to Cipro PO on discharge - End Date: 2/20/20)  --Would consider discontinuing Micafungin - no evidence of yeast in prior cultures.   --Continue to follow CBC with diff  --Continue to follow temperature curve  --Follow up on preliminary blood cultures   --Follow up on preliminary 1/22 and 1/24 IR cultures   --Management of Saddle PE per Transplant and vascular teams\    I will continue to follow. Please feel free to contact me with any further questions.    Dada Jacobo M.D.  [facility] Division of Infectious Disease  8AM-5PM: Pager Number [phone]  After Hours (or if no response): Please contact the Infectious Diseases Office at [phone]

## 2020-01-29 NOTE — PROGRESS NOTE ADULT - PROBLEM SELECTOR PLAN 1
Last HD treatment on 1/28/20, with PUF on 1/27/20. Follows with nephrologist, Dr Amaya. HD/PUF per primary nephrology team.

## 2020-01-29 NOTE — PROGRESS NOTE ADULT - SUBJECTIVE AND OBJECTIVE BOX
No complaints    Vital Signs Last 24 Hrs  T(C): 37.1 (01-29-20 @ 17:00), Max: 37.4 (01-29-20 @ 09:00)  T(F): 98.8 (01-29-20 @ 17:00), Max: 99.3 (01-29-20 @ 09:00)  HR: 67 (01-29-20 @ 17:00) (63 - 81)  BP: 103/64 (01-29-20 @ 17:00) (95/51 - 142/77)  RR: 18 (01-29-20 @ 17:00) (18 - 20)  SpO2: 99% (01-29-20 @ 17:00) (96% - 99%)    Card S1S2  Lungs b/l air entry  Abd soft  Extr ++ edema b/l LE R > L, + edema RUE, AVF patent                                                                                                                                                                                                              7.1    15.35 )-----------( 878      ( 28 Jan 2020 06:20 )             23.9     28 Jan 2020 06:20    137    |  96     |  25     ----------------------------<  98     4.4     |  27     |  6.65     Ca    9.1        28 Jan 2020 06:20  Phos  3.3       28 Jan 2020 06:20  Mg     2.0       28 Jan 2020 06:20    TPro  6.2    /  Alb  2.3    /  TBili  0.6    /  DBili  0.3    /  AST  20     /  ALT  <5     /  AlkPhos  288    28 Jan 2020 06:20    LIVER FUNCTIONS - ( 28 Jan 2020 06:20 )  Alb: 2.3 g/dL / Pro: 6.2 g/dL / ALK PHOS: 288 U/L / ALT: <5 U/L / AST: 20 U/L / GGT: x           PT/INR - ( 28 Jan 2020 06:20 )   PT: 19.7 sec;   INR: 1.70 ratio      acetaminophen   Tablet .. 975 milliGRAM(s) Oral every 6 hours PRN  allopurinol 100 milliGRAM(s) Oral daily  aMIOdarone    Tablet 200 milliGRAM(s) Oral daily  apixaban 5 milliGRAM(s) Oral two times a day  aspirin  chewable 81 milliGRAM(s) Oral daily  brimonidine 0.2% Ophthalmic Solution 1 Drop(s) Both EYES three times a day  chlorhexidine 2% Cloths 1 Application(s) Topical <User Schedule>  epoetin trey Injectable 36930 Unit(s) IV Push <User Schedule>  gabapentin 100 milliGRAM(s) Oral every 12 hours  latanoprost 0.005% Ophthalmic Solution 1 Drop(s) Both EYES daily  meropenem  IVPB 500 milliGRAM(s) IV Intermittent every 12 hours  metoprolol tartrate 12.5 milliGRAM(s) Oral two times a day  micafungin IVPB 100 milliGRAM(s) IV Intermittent every 24 hours  Nephro-candace 1 Tablet(s) Oral daily  oxyCODONE    IR 10 milliGRAM(s) Oral every 6 hours PRN  oxyCODONE    IR 5 milliGRAM(s) Oral every 4 hours PRN    A/P:    Adm w/Flu A 12/27/19  Hx ESRD on HD, s/p DDRT 12/8/19, DGF  S/p transplant kidney bx 12/13: ATN, focal TMA  S/p PLEX 12/13, 12/15; d/c-d 12/20 w/op HD 3 x wk  Re-adm 12/27 w/infected carlos-nephric hematoma   S/p OR 1/1 for washout and repeat renal graft biopsy (c/w ATN)  Dx w/RLE DVT  Developed gross hematuria  S/p OR 1/10 for infected carlos-nephric hematoma, hemorrhage involving pseudo-aneurysm of anastomosis of renal artery to external iliac vein,   s/p transplant nephrectomy, s/p IVCF   S/p PEA arrest 1/13, intubated, + PE, R retrop collection  S/p IR drainage of abd collection  RUE DVT  Abx per ID  HD TTS  Epogen w/HD 3 x week

## 2020-01-30 LAB
ALBUMIN SERPL ELPH-MCNC: 2.4 G/DL — LOW (ref 3.3–5)
ALP SERPL-CCNC: 288 U/L — HIGH (ref 40–120)
ALT FLD-CCNC: 7 U/L — LOW (ref 10–45)
ANION GAP SERPL CALC-SCNC: 13 MMOL/L — SIGNIFICANT CHANGE UP (ref 5–17)
APTT BLD: 41.2 SEC — HIGH (ref 27.5–36.3)
AST SERPL-CCNC: 21 U/L — SIGNIFICANT CHANGE UP (ref 10–40)
BILIRUB DIRECT SERPL-MCNC: 0.3 MG/DL — HIGH (ref 0–0.2)
BILIRUB INDIRECT FLD-MCNC: 0.2 MG/DL — SIGNIFICANT CHANGE UP (ref 0.2–1)
BILIRUB SERPL-MCNC: 0.5 MG/DL — SIGNIFICANT CHANGE UP (ref 0.2–1.2)
BLD GP AB SCN SERPL QL: NEGATIVE — SIGNIFICANT CHANGE UP
BUN SERPL-MCNC: 22 MG/DL — SIGNIFICANT CHANGE UP (ref 7–23)
CALCIUM SERPL-MCNC: 8.7 MG/DL — SIGNIFICANT CHANGE UP (ref 8.4–10.5)
CHLORIDE SERPL-SCNC: 94 MMOL/L — LOW (ref 96–108)
CO2 SERPL-SCNC: 27 MMOL/L — SIGNIFICANT CHANGE UP (ref 22–31)
CREAT SERPL-MCNC: 5.23 MG/DL — HIGH (ref 0.5–1.3)
GLUCOSE SERPL-MCNC: 106 MG/DL — HIGH (ref 70–99)
HCT VFR BLD CALC: 21.7 % — LOW (ref 39–50)
HGB BLD-MCNC: 6.9 G/DL — CRITICAL LOW (ref 13–17)
INR BLD: 1.59 RATIO — HIGH (ref 0.88–1.16)
MAGNESIUM SERPL-MCNC: 1.9 MG/DL — SIGNIFICANT CHANGE UP (ref 1.6–2.6)
MCHC RBC-ENTMCNC: 29.7 PG — SIGNIFICANT CHANGE UP (ref 27–34)
MCHC RBC-ENTMCNC: 31.8 GM/DL — LOW (ref 32–36)
MCV RBC AUTO: 93.5 FL — SIGNIFICANT CHANGE UP (ref 80–100)
NRBC # BLD: 0 /100 WBCS — SIGNIFICANT CHANGE UP (ref 0–0)
PHOSPHATE SERPL-MCNC: 3.1 MG/DL — SIGNIFICANT CHANGE UP (ref 2.5–4.5)
PLATELET # BLD AUTO: 872 K/UL — HIGH (ref 150–400)
POTASSIUM SERPL-MCNC: 3.7 MMOL/L — SIGNIFICANT CHANGE UP (ref 3.5–5.3)
POTASSIUM SERPL-SCNC: 3.7 MMOL/L — SIGNIFICANT CHANGE UP (ref 3.5–5.3)
PROCALCITONIN SERPL-MCNC: 0.72 NG/ML — HIGH (ref 0.02–0.1)
PROT SERPL-MCNC: 5.9 G/DL — LOW (ref 6–8.3)
PROTHROM AB SERPL-ACNC: 18.5 SEC — HIGH (ref 10–12.9)
RBC # BLD: 2.32 M/UL — LOW (ref 4.2–5.8)
RBC # FLD: 16.9 % — HIGH (ref 10.3–14.5)
RH IG SCN BLD-IMP: POSITIVE — SIGNIFICANT CHANGE UP
SODIUM SERPL-SCNC: 134 MMOL/L — LOW (ref 135–145)
WBC # BLD: 13.88 K/UL — HIGH (ref 3.8–10.5)
WBC # FLD AUTO: 13.88 K/UL — HIGH (ref 3.8–10.5)

## 2020-01-30 PROCEDURE — 99232 SBSQ HOSP IP/OBS MODERATE 35: CPT | Mod: GC

## 2020-01-30 PROCEDURE — 99232 SBSQ HOSP IP/OBS MODERATE 35: CPT

## 2020-01-30 RX ADMIN — OXYCODONE HYDROCHLORIDE 5 MILLIGRAM(S): 5 TABLET ORAL at 09:02

## 2020-01-30 RX ADMIN — BRIMONIDINE TARTRATE 1 DROP(S): 2 SOLUTION/ DROPS OPHTHALMIC at 05:30

## 2020-01-30 RX ADMIN — GABAPENTIN 100 MILLIGRAM(S): 400 CAPSULE ORAL at 05:30

## 2020-01-30 RX ADMIN — LATANOPROST 1 DROP(S): 0.05 SOLUTION/ DROPS OPHTHALMIC; TOPICAL at 12:15

## 2020-01-30 RX ADMIN — OXYCODONE HYDROCHLORIDE 10 MILLIGRAM(S): 5 TABLET ORAL at 05:31

## 2020-01-30 RX ADMIN — MICAFUNGIN SODIUM 105 MILLIGRAM(S): 100 INJECTION, POWDER, LYOPHILIZED, FOR SOLUTION INTRAVENOUS at 18:33

## 2020-01-30 RX ADMIN — MEROPENEM 100 MILLIGRAM(S): 1 INJECTION INTRAVENOUS at 05:30

## 2020-01-30 RX ADMIN — BRIMONIDINE TARTRATE 1 DROP(S): 2 SOLUTION/ DROPS OPHTHALMIC at 21:30

## 2020-01-30 RX ADMIN — CHLORHEXIDINE GLUCONATE 1 APPLICATION(S): 213 SOLUTION TOPICAL at 06:29

## 2020-01-30 RX ADMIN — OXYCODONE HYDROCHLORIDE 10 MILLIGRAM(S): 5 TABLET ORAL at 22:00

## 2020-01-30 RX ADMIN — BRIMONIDINE TARTRATE 1 DROP(S): 2 SOLUTION/ DROPS OPHTHALMIC at 18:34

## 2020-01-30 RX ADMIN — GABAPENTIN 100 MILLIGRAM(S): 400 CAPSULE ORAL at 18:34

## 2020-01-30 RX ADMIN — APIXABAN 5 MILLIGRAM(S): 2.5 TABLET, FILM COATED ORAL at 05:30

## 2020-01-30 RX ADMIN — OXYCODONE HYDROCHLORIDE 10 MILLIGRAM(S): 5 TABLET ORAL at 06:00

## 2020-01-30 RX ADMIN — OXYCODONE HYDROCHLORIDE 5 MILLIGRAM(S): 5 TABLET ORAL at 10:02

## 2020-01-30 RX ADMIN — APIXABAN 5 MILLIGRAM(S): 2.5 TABLET, FILM COATED ORAL at 18:34

## 2020-01-30 RX ADMIN — Medication 1 TABLET(S): at 12:15

## 2020-01-30 RX ADMIN — Medication 81 MILLIGRAM(S): at 12:14

## 2020-01-30 RX ADMIN — ERYTHROPOIETIN 10000 UNIT(S): 10000 INJECTION, SOLUTION INTRAVENOUS; SUBCUTANEOUS at 15:11

## 2020-01-30 RX ADMIN — OXYCODONE HYDROCHLORIDE 10 MILLIGRAM(S): 5 TABLET ORAL at 12:16

## 2020-01-30 RX ADMIN — Medication 100 MILLIGRAM(S): at 12:15

## 2020-01-30 RX ADMIN — OXYCODONE HYDROCHLORIDE 10 MILLIGRAM(S): 5 TABLET ORAL at 21:31

## 2020-01-30 RX ADMIN — MEROPENEM 100 MILLIGRAM(S): 1 INJECTION INTRAVENOUS at 20:07

## 2020-01-30 RX ADMIN — AMIODARONE HYDROCHLORIDE 200 MILLIGRAM(S): 400 TABLET ORAL at 05:30

## 2020-01-30 RX ADMIN — OXYCODONE HYDROCHLORIDE 10 MILLIGRAM(S): 5 TABLET ORAL at 12:45

## 2020-01-30 NOTE — PROGRESS NOTE ADULT - ASSESSMENT
49 year old male with ESRD on HD, now s/p ddrt on 12/8/2019 course complicated by TMA/profound ATN, who presents from a HD center with a fever and cough. He was initially admitted with influenza. He is s/p washout of infected collection and biopsy which revealed ATN.  He was found to have diffusely enlarging perinephric fluid collection.    While on telemetry, he had an episode of a wide complex tachycardia. It initially appears irregular, suggesting an atrial arrhythmia with aberrancy, though the remainder appears more like an episode of non-sustained VT.   s/p washout on 1/1/2020 with worsening sepsis and transfer to ICU, s/p IR drainage of perinephric collection on 1/6. 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 He underwent operative repair of right external iliac artery with pericardial patch, transplant nephrectomy, RLE thrombectomy, and IVC filter on 1/9.     s/p corrina ->tachy arrest with tdp, af rvr, now on amio, with large PE    - developed malignant arrhythmias which seemed to start with bradycardia, leading to more tachycardia and bursts of VT and rapid AF.  The apparent af degenerated into torsades.  He was shocked and has been maintaining SR since  - cont amio at 200 daily. No further events on telemetry  - cont metoprolol as tolerated by BP. Can give on non HD days and see how bp reacts on HD.   - repeat ekg daily to evaluate qtc, while on antifungals/amiodarone  - arrhythmias were most likely triggered by the massive vte event, are unlikely to represent a primary cardiac issue  - now remains in a SR without ectopy  - hs trop noted, though in the context of his arrhythmias, and right heart strain from the pe, the trop is more likely to reflect demand than an acs event  - cont asa     - now on apixiban  - echo with preserved/hyperdynamic lv, with RV failure consistent with acute RV overload  - CTPA on 1/21 demonstrated some resolution of PE and evolving pulmonary infarcts.   - Further cardiac workup will depend on clinical course.  - will follow with you

## 2020-01-30 NOTE — PROGRESS NOTE ADULT - PROBLEM SELECTOR PLAN 4
Leukocytosis stable at 15K two days ago. Awaiting repeat labs today. On Meropenem and Micafungin. To be discharged (when stable) with ciprofloxacin ID is following.

## 2020-01-30 NOTE — PROGRESS NOTE ADULT - SUBJECTIVE AND OBJECTIVE BOX
[facility] - Melissa Kelsey, Enrique, Maximino, Marin, Ravi Goodson  Office Number:  [phone]    Patient resting comfortably in bed in NAD.  Laying flat with no respiratory distress.  No complaints of chest pain, dyspnea, palpitations, PND, or orthopnea.  feeling much better today    ROS: negative unless otherwise mentioned.    Telemetry:  sr    MEDICATIONS  (STANDING):  allopurinol 100 milliGRAM(s) Oral daily  aMIOdarone    Tablet 200 milliGRAM(s) Oral daily  apixaban 5 milliGRAM(s) Oral two times a day  aspirin  chewable 81 milliGRAM(s) Oral daily  brimonidine 0.2% Ophthalmic Solution 1 Drop(s) Both EYES three times a day  chlorhexidine 2% Cloths 1 Application(s) Topical <User Schedule>  epoetin trey Injectable 68029 Unit(s) IV Push <User Schedule>  gabapentin 100 milliGRAM(s) Oral every 12 hours  latanoprost 0.005% Ophthalmic Solution 1 Drop(s) Both EYES daily  meropenem  IVPB 500 milliGRAM(s) IV Intermittent every 12 hours  metoprolol tartrate 12.5 milliGRAM(s) Oral two times a day  micafungin IVPB 100 milliGRAM(s) IV Intermittent every 24 hours  Nephro-candace 1 Tablet(s) Oral daily    MEDICATIONS  (PRN):  acetaminophen   Tablet .. 975 milliGRAM(s) Oral every 6 hours PRN Mild Pain (1 - 3)  oxyCODONE    IR 10 milliGRAM(s) Oral every 6 hours PRN Severe Pain (7 - 10)  oxyCODONE    IR 5 milliGRAM(s) Oral every 4 hours PRN Moderate Pain (4 - 6)      Allergies    coconut (Anaphylaxis)  morphine (Pruritus; Rash)    Intolerances        Vital Signs Last 24 Hrs  T(C): 36.7 (30 Jan 2020 05:00), Max: 37.4 (29 Jan 2020 09:00)  T(F): 98.1 (30 Jan 2020 05:00), Max: 99.3 (29 Jan 2020 09:00)  HR: 63 (30 Jan 2020 05:00) (62 - 81)  BP: 114/68 (30 Jan 2020 05:00) (98/60 - 126/76)  BP(mean): --  RR: 18 (30 Jan 2020 05:00) (18 - 20)  SpO2: 97% (30 Jan 2020 05:00) (96% - 99%)    I&O's Summary    29 Jan 2020 07:01  -  30 Jan 2020 07:00  --------------------------------------------------------  IN: 1410 mL / OUT: 110 mL / NET: 1300 mL        ON EXAM:    Constitutional: NAD, awake    HEENT: Moist Mucous Membranes, Anicteric  Pulmonary: Decreased breath sounds b/l. No rales, crackles or wheeze appreciated.   Cardiovascular: Regular, S1 and S2, No murmurs, rubs, gallops or clicks  Gastrointestinal: Bowel Sounds present, soft, nontender.   Lymph: dependent peripheral edema. No lymphadenopathy.  Skin: No visible rashes or ulcers.  Psych:  Mood & affect appropriate for situation    LABS: All Labs Reviewed:                        7.1    15.35 )-----------( 878      ( 28 Jan 2020 06:20 )             23.9     28 Jan 2020 06:20    137    |  96     |  25     ----------------------------<  98     4.4     |  27     |  6.65     Ca    9.1        28 Jan 2020 06:20  Phos  3.3       28 Jan 2020 06:20  Mg     2.0       28 Jan 2020 06:20    TPro  6.2    /  Alb  2.3    /  TBili  0.6    /  DBili  0.3    /  AST  20     /  ALT  <5     /  AlkPhos  288    28 Jan 2020 06:20          Blood Culture:

## 2020-01-30 NOTE — PROGRESS NOTE ADULT - SUBJECTIVE AND OBJECTIVE BOX
No complaints, seen on HD    Vital Signs Last 24 Hrs  T(C): 36.7 (01-30-20 @ 12:15), Max: 37.1 (01-29-20 @ 17:00)  T(F): 98.1 (01-30-20 @ 12:15), Max: 98.8 (01-29-20 @ 17:00)  HR: 69 (01-30-20 @ 12:15) (62 - 71)  BP: 133/87 (01-30-20 @ 12:15) (98/60 - 133/87)  RR: 18 (01-30-20 @ 12:15) (18 - 18)  SpO2: 98% (01-30-20 @ 12:15) (96% - 99%)    Card S1S2  Lungs b/l air entry  Abd soft  Extr ++ edema b/l LE R > L, + edema RUE, AVF patent                                                                                                                                                                                                        6.9    13.88 )-----------( 872      ( 30 Jan 2020 14:05 )             21.7     30 Jan 2020 14:05    134    |  94     |  22     ----------------------------<  106    3.7     |  27     |  5.23     Ca    8.7        30 Jan 2020 14:05  Phos  3.1       30 Jan 2020 14:05  Mg     1.9       30 Jan 2020 14:05    TPro  5.9    /  Alb  2.4    /  TBili  0.5    /  DBili  0.3    /  AST  21     /  ALT  7      /  AlkPhos  288    30 Jan 2020 14:05    LIVER FUNCTIONS - ( 30 Jan 2020 14:05 )  Alb: 2.4 g/dL / Pro: 5.9 g/dL / ALK PHOS: 288 U/L / ALT: 7 U/L / AST: 21 U/L / GGT: x           PT/INR - ( 30 Jan 2020 14:05 )   PT: 18.5 sec;   INR: 1.59 ratio      acetaminophen   Tablet .. 975 milliGRAM(s) Oral every 6 hours PRN  allopurinol 100 milliGRAM(s) Oral daily  aMIOdarone    Tablet 200 milliGRAM(s) Oral daily  apixaban 5 milliGRAM(s) Oral two times a day  aspirin  chewable 81 milliGRAM(s) Oral daily  brimonidine 0.2% Ophthalmic Solution 1 Drop(s) Both EYES three times a day  chlorhexidine 2% Cloths 1 Application(s) Topical <User Schedule>  epoetin trey Injectable 92643 Unit(s) IV Push <User Schedule>  gabapentin 100 milliGRAM(s) Oral every 12 hours  latanoprost 0.005% Ophthalmic Solution 1 Drop(s) Both EYES daily  meropenem  IVPB 500 milliGRAM(s) IV Intermittent every 12 hours  metoprolol tartrate 12.5 milliGRAM(s) Oral two times a day  micafungin IVPB 100 milliGRAM(s) IV Intermittent every 24 hours  Nephro-candace 1 Tablet(s) Oral daily  oxyCODONE    IR 10 milliGRAM(s) Oral every 6 hours PRN  oxyCODONE    IR 5 milliGRAM(s) Oral every 4 hours PRN    A/P:    Hx ESRD on HD, s/p DDRT 12/8/19, DGF  S/p transplant kidney bx 12/13: ATN, focal TMA  S/p PLEX 12/13, 12/15; d/c-d 12/20 w/op HD 3 x wk  Re-adm 12/27 w/Flu A, infected carlos-nephric hematoma   S/p OR 1/1 for washout and repeat renal graft biopsy (c/w ATN)  Dx w/RLE DVT  Developed gross hematuria  S/p OR 1/10 for infected carlos-nephric hematoma, hemorrhage involving pseudo-aneurysm of anastomosis of renal artery to external iliac vein,   s/p transplant nephrectomy, s/p IVCF   S/p PEA arrest 1/13, dx w/PE, R retrop collection  S/p IR drainage of abd collection  RUE DVT  Abx per ID  HD TTS  TMP as able  Epogen w/HD 3 x week  PT

## 2020-01-30 NOTE — PROGRESS NOTE ADULT - SUBJECTIVE AND OBJECTIVE BOX
Transplant Surgery - Multidisciplinary Rounds  --------------------------------------------------------------  R DDRT    Date:  12/8/19     Present:   Patient seen and examined with Dr. Be, Dr. Cruz, Dr. Espinal, nephrologist Dr. SHARLENE Puckett,   NP: Sarah Arzola,  Pharmacist: Debbi Ortiz  during am rounds. Seen and examined with Dr. Be.  Disciplines not in attendance will be notified of the plan.       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 Young, followed by Dr. Raphael, [facility] Oncologist), RA with hand contractures. He underwent DDRT on 12/8/19. Post op course c/b DGF requiring HD, thrombocytopenia, elevated LDH and haptoglobin. Schistocytes  on peripheral smear concerning for TMA. Envarsus held. Received Belatacept 12/13. Started on PLEX (12/12, 12/15). Underwent renal bx on 12/13 c/w profound ATN.  Discharged home on 12/20/19 w/ outpatient HD.     Re-admitted 12/27 with influenza, completed treatement with Tamiflu on 12/31. Urine cx on admission grew Ent cloacae, was started on meropenem 12/31. Renal US on admission with increasing hematoma.   ·	OR 1/1 for ex-lap, hematoma evacuation, washout and renal bx. (c/w ATN)  OR cxs grew CRE and e coli. Post op course c/b:   ·	fevers and leukocytosis, CT a/p (1/4) with increased perinephric collection. Underwent IR guided collection 1/6   ·	New onset hematuria, martinez inserted 1/7, started CBI  ·	RLE edema, RLE doppler (1/8) with acute above the knee thrombus of R external iliac, common femoral and femoral veins, Acute calf vein thrombus affecting R soleal vein, Heparin gtt initiated   ·	AMS 1/9  (head CT neg), hyponatremic  ·	Significant hematuria and bleeding around the martinez. Angio 1/9 showed actively bleeding pseudoaneurysm at the anastomosis of the transplant renal artery with the recipient iliac artery. A stent was placed in the iliac artery to  control hemorrhage. IVC filter placed.   ·	Emergent OR 1/9-1/10 for graft nephrectomy;  ureter to bladder anastomosis was completely disrupted, repaired the R external iliac artery with bovine pericardial patch, repaired right external iliac vein, performed thrombectomy of RLE veins, Intra op received 3u PRBC.   ·	Extubated 1/11  ·	1/13 Saddle PE, with right heart strain and complicated by PEA arrest followed by Torsade, ROSC achieved after ACLS; re-intubated  ·	1/15 Renal US with 13.7 x 5.0 x 1.5 cm in RLQ transplant bed; s/p bedside IR guided drainage, pigtail in place. Fluid cx to date with NG  ·	1/21 CT C/A/P showing persistent collection, catheter at superior portion of collection  ·	1/22 IR pigtail drain repositioned and upsized to 12Fr catheter w/ increased output  ·	1/23 Transitioned to Eliquis from Heparin drip    Interval events:    -Afebrile, stable vitals.  -HD T/ TH/ S as per nephrology. Labs drawn every 48 hours w/ HD  - metoprolol to be held on HD days 2/2 hypotension   -LE w/ minimal improvement in edema.   -improvement with ambulation 1 person assist w walker.   - Plts remains elevated Heme following   -Hemolysis work-up in progress, will f/u heme recs.  -On meropenem and micafungin.      cxs:   12/29: Urine Cx: Enterobacter Cloacae  1/1 OR Perinephric collection Cx:  Carbapenem resistant Ent Cloacae, E. Coli  1/1: R kidney abscess swab: Enterobacter Cloacae  1/1  OR tissue: Enterobacter Cloacae  1/5: Urine Cx: Enteropbacter Cloacae  1/5: Skin incision Cx (bedside): NG  1/6: Adominal fluid Cx from IR:  NG   1/7: Urine Cx:  NG  1/7: BCX2:  NG  1/10 retroperit hematoma - enerobacter   1/10 bladder hematoma - enterobacter  1/15 Drainage fluid - NGTD  1/16: BC X2: NGTD  1/18: SPutum: Normal harvey  1/21: BC X2: NTGD  1/22: IR body fluid Cx: GNR pre-milian    Potential Discharge date: pending clinical improvement      MEDICATIONS  (STANDING):  allopurinol 100 milliGRAM(s) Oral daily  aMIOdarone    Tablet 200 milliGRAM(s) Oral daily  apixaban 5 milliGRAM(s) Oral two times a day  aspirin  chewable 81 milliGRAM(s) Oral daily  brimonidine 0.2% Ophthalmic Solution 1 Drop(s) Both EYES three times a day  chlorhexidine 2% Cloths 1 Application(s) Topical <User Schedule>  epoetin trey Injectable 61725 Unit(s) IV Push <User Schedule>  gabapentin 100 milliGRAM(s) Oral every 12 hours  latanoprost 0.005% Ophthalmic Solution 1 Drop(s) Both EYES daily  meropenem  IVPB 500 milliGRAM(s) IV Intermittent every 12 hours  metoprolol tartrate 12.5 milliGRAM(s) Oral two times a day  micafungin IVPB 100 milliGRAM(s) IV Intermittent every 24 hours  Nephro-candace 1 Tablet(s) Oral daily    MEDICATIONS  (PRN):  acetaminophen   Tablet .. 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  Carpal tunnel syndrome  Brain cyst: had MRI recently- now gone  Anemia  Gastric ulcer: Long time ago  Rheumatoid arthritis  Renal transplant recipient  Breakdown (mechanical) of penile (implanted) prosthesis, sequela  End-stage renal disease (ESRD): PERMACATH PLACEMENT  Abscess of left buttock: s/p I &amp; D  AV fistula: placement left lower arm  S/P cystoscopy: stent placement &amp; removal for kidney stones, lithiotripsy  s/p Lt Carpal tunnel: 2011      Vital Signs Last 24 Hrs  T(C): 36.7 (30 Jan 2020 12:15), Max: 37.1 (29 Jan 2020 17:00)  T(F): 98.1 (30 Jan 2020 12:15), Max: 98.8 (29 Jan 2020 17:00)  HR: 69 (30 Jan 2020 12:15) (62 - 71)  BP: 133/87 (30 Jan 2020 12:15) (98/60 - 133/87)  BP(mean): --  RR: 18 (30 Jan 2020 12:15) (18 - 20)  SpO2: 98% (30 Jan 2020 12:15) (96% - 99%)    I&O's Summary    29 Jan 2020 07:01  -  30 Jan 2020 07:00  --------------------------------------------------------  IN: 1410 mL / OUT: 110 mL / NET: 1300 mL    30 Jan 2020 07:01  -  30 Jan 2020 12:29  --------------------------------------------------------  IN: 550 mL / OUT: 20 mL / NET: 530 mL          LABS:              CAPILLARY BLOOD GLUCOSE            Culture - Blood (collected 01-24-20 @ 05:14)  Source: .Blood Blood  Final Report (01-29-20 @ 06:00):    No growth at 5 days.    Culture - Fungal, Body Fluid (collected 01-24-20 @ 02:22)  Source: .Body Fluid RLQ Fluid Collection  Preliminary Report (01-24-20 @ 15:47):    Testing in progress    Culture - Blood (collected 01-24-20 @ 01:59)  Source: .Blood Blood  Final Report (01-29-20 @ 02:01):    No growth at 5 days.          Cultures:    Culture - Blood (collected 01-24-20 @ 05:14)  Source: .Blood Blood  Final Report (01-29-20 @ 06:00):    No growth at 5 days.    Culture - Fungal, Body Fluid (collected 01-24-20 @ 02:22)  Source: .Body Fluid RLQ Fluid Collection  Preliminary Report (01-24-20 @ 15:47):    Testing in progress    Culture - Blood (collected 01-24-20 @ 01:59)  Source: .Blood Blood  Final Report (01-29-20 @ 02:01):    No growth at 5 days.         REVIEW OF SYSTEMS  Gen: + weakness   Skin: No rashes  Head/Eyes/Ears/Mouth: No headache; Normal hearing; Normal vision w/o blurriness; No sinus pain/discomfort, sore throat  Respiratory: no SOB  CV: No chest pain, PND, orthopnea  GI: incisional tenderness   : anuric  MSK: + lower extremity edema, RUE edema, flank edema  Neuro: No dizziness/lightheadedness, weakness, seizures, numbness, tingling  Heme: No easy bruising or bleeding  Endo: No heat/cold intolerance  Psych: No significant nervousness, anxiety, stress, depression      PHYSICAL EXAM:    Constitutional: NAD  	Eyes: Anicteric, PERRLA  	Respiratory: CTA b/l  	Cardiovascular: RRR  	Gastrointestinal: Soft abdomen, appropriate incisional TTP, ND.  wound vac ss/murky output (wound seen during recent wound vac change, good granulation tissue developing), RLQ drain with SS drainage    Genitourinary:  anuric, large amount scrotal edema     Extremities: b/l 2-3+ lower ext edema ace wrapped (slightly improved since yesterday); no ulcerations or skin breakdown, flank edema, RUE edema slow improvement   	Vascular: DP signal bilateral, L AVF palpable  	Neurological: A&O x3.  	Skin: no new lesions/ulcerations  	Musculoskeletal: Moving all extremities

## 2020-01-30 NOTE — PROGRESS NOTE ADULT - ATTENDING COMMENTS
ESRD s/p transplant nephrectomy  DVT/ PE  b/l LE and scrotal edema.  Pt being UF as tolerated on dialysis  Cont Eliquis.

## 2020-01-30 NOTE — PROGRESS NOTE ADULT - SUBJECTIVE AND OBJECTIVE BOX
[facility] DIVISION OF KIDNEY DISEASES AND HYPERTENSION -- FOLLOW UP NOTE  --------------------------------------------------------------------------------  HPI: 49 yr old man with ESRD s/p DDRT in Dec 2019 complicated by DGF due to ATN and early TMA thought to be due to prograf. Hospital course was further complicated by infected perinephric hematoma, dehiscence of the ureter-bladder anastomosis as well as vascular anastomosis and underwent graft nephrectomy on 1/10/20. Course further complicated by cardiac arrest 2/2 saddle PE. Pt. is now stable and on medical floors. Currently with volume overload, on HD (with last treatment on 1/28/20, without UF due to hypotension).     Pt. seen and examined at bedside this AM. Pt. feels improved daily.    PAST HISTORY  --------------------------------------------------------------------------------  No significant changes to PMH, PSH, FHx, SHx, unless otherwise noted    ALLERGIES & MEDICATIONS  --------------------------------------------------------------------------------  Allergies    coconut (Anaphylaxis)  morphine (Pruritus; Rash)    Intolerances    Standing Inpatient Medications  allopurinol 100 milliGRAM(s) Oral daily  aMIOdarone    Tablet 200 milliGRAM(s) Oral daily  apixaban 5 milliGRAM(s) Oral two times a day  aspirin  chewable 81 milliGRAM(s) Oral daily  brimonidine 0.2% Ophthalmic Solution 1 Drop(s) Both EYES three times a day  chlorhexidine 2% Cloths 1 Application(s) Topical <User Schedule>  epoetin trey Injectable 59638 Unit(s) IV Push <User Schedule>  gabapentin 100 milliGRAM(s) Oral every 12 hours  latanoprost 0.005% Ophthalmic Solution 1 Drop(s) Both EYES daily  meropenem  IVPB 500 milliGRAM(s) IV Intermittent every 12 hours  metoprolol tartrate 12.5 milliGRAM(s) Oral two times a day  micafungin IVPB 100 milliGRAM(s) IV Intermittent every 24 hours  Nephro-candace 1 Tablet(s) Oral daily    REVIEW OF SYSTEMS  --------------------------------------------------------------------------------  Gen: No lethargy  Respiratory: No dyspnea  CV: No chest pain  GI: No abdominal pain  MSK: + LE edema  Neuro: No dizziness  Heme: No bleeding    All other systems were reviewed and are negative, except as noted.    VITALS/PHYSICAL EXAM  --------------------------------------------------------------------------------  T(C): 36.7 (01-30-20 @ 05:00), Max: 37.4 (01-29-20 @ 09:00)  HR: 63 (01-30-20 @ 05:00) (62 - 81)  BP: 114/68 (01-30-20 @ 05:00) (98/60 - 126/76)  RR: 18 (01-30-20 @ 05:00) (18 - 20)  SpO2: 97% (01-30-20 @ 05:00) (96% - 99%)  Wt(kg): --    01-29-20 @ 07:01  -  01-30-20 @ 07:00  --------------------------------------------------------  IN: 1410 mL / OUT: 110 mL / NET: 1300 mL    Physical Exam:  	Gen: NAD, resting  	HEENT: supple neck  	Pulm: decreased breath sounds bilaterally  	CV: RRR, S1S2; no rub  	Abd: +BS, soft, RLQ with drain in place  	: scrotal edema  	UE: RUE swelling  	LE: b/l pitting edema (mildly improved)    LABS/STUDIES  --------------------------------------------------------------------------------  Creatinine Trend:  SCr 6.65 [01-28 @ 06:20]  SCr 5.42 [01-27 @ 06:39]  SCr 4.16 [01-26 @ 06:07]  SCr 5.90 [01-25 @ 06:13]  SCr 4.59 [01-24 @ 01:39]

## 2020-01-30 NOTE — PROGRESS NOTE ADULT - ASSESSMENT
49M PMHx of ESRD on HD  ( via LUE AVF), HTN, Gout, Glaucoma, NET of pancreas, RA with hand contractures. s/p DDRT 12/8/19,  Post op course c/b DGF requiring HD. Renal bx x 2 c/w profound ATN.     Re-admitted with Influenza and perinephric hematoma. s/p hematoma evacuation/ abd washout and renal biopsy. Post op course c/b leukocytosis, infected hematoma, acute RLE DVT, and bleeding pseudoaneurysm at txp renal artery anastomosis. Now s/p graft nephrectomy. Course further complicated by saddle PE causing right heart strain and hemodynamic instability leading to cardiac arrest requiring ACLS, Required IR drainage of post-op collections x2. Transferred to floor on 1/25/2020.     [] DDRT c/b DGF/ATN/perinephric infected hematoma/pseudoneurysm/graft nephrectomy   - HD per nephrology team now T/ TH/ S  -  will plan for holding BB on HD days 2/2 low bp   - s/p bedside IR drainage of RLQ fluid collection (1/15), drain repositioned 1/22 and cultures pending result this far negative  - FU final IR culture, fungal culture from 1/22- pending.   - Repeat chest/abd/pelvis CT 1/28- noted with continued resolution of right lower quadrant fluid collection. Persistent bilateral lower lobe pulmonary emboli.  - ID following;  cont Meropenem, Micafungin 100mg daily.   ID plan for discharge on cipro.  - pain control  - bowel regimen  - PT/OT/OOB.  VAC change per PT  - Keep B/L LE bandaged with ace wrap and skin care      [] Saddle PE, with right heart strain and complicated by PEA arrest followed by Torsade, ROSC achieved after ACLS  - vascular, CT and vascular cardiology following   -Repeat chest CT 1/28 noted Persistent bilateral lower lobe pulmonary emboli.   - CT 1/21: There are pulmonary emboli within the right lower lobar arteries and the left lower lobar arteries extending into the distal segments as seen on prior study. The pulmonary emboli seen in the right and left pulmonary arteries are normal longer visualized. New distal to mid brachial DVT noted  1/19 Duplex: RUE Deep vein thrombosis in the mid to distal brachial vein.  - Continue Eliquis to 5mg PO BID   - continue ASA  - repeat ECHO with improved R heart strain    [] New Afib/Vtech  Spoke with cardiology who recommends for BB low dose in the setting of WCT and agreed with holding BB on HD days  Continue amiodarone, card plan to continue for couple of months and will f/u as outpt    [] New acute DVT of R distal brachia vein   - continue anticoagulation     [] New acute DVT of R external iliac, common femoral, femoral veins, R soleal vein requiring heparin drip  - IVC filter placed 1/9, continue anticoagulation     [] Thrombocytosis  - rising INR/PLT counts over the past week.   -Heme consulted; likely reactive. Heme work up in progress    [] Dispo  - continue tele  -discharge planning in progress d/c to acute rehab once medically stable.

## 2020-01-31 LAB
ALBUMIN SERPL ELPH-MCNC: 2.4 G/DL — LOW (ref 3.3–5)
ALP SERPL-CCNC: 287 U/L — HIGH (ref 40–120)
ALT FLD-CCNC: <5 U/L — LOW (ref 10–45)
ANION GAP SERPL CALC-SCNC: 11 MMOL/L — SIGNIFICANT CHANGE UP (ref 5–17)
APTT BLD: 41.3 SEC — HIGH (ref 27.5–36.3)
AST SERPL-CCNC: 24 U/L — SIGNIFICANT CHANGE UP (ref 10–40)
BASOPHILS # BLD AUTO: 0.12 K/UL — SIGNIFICANT CHANGE UP (ref 0–0.2)
BASOPHILS NFR BLD AUTO: 1 % — SIGNIFICANT CHANGE UP (ref 0–2)
BILIRUB DIRECT SERPL-MCNC: 0.3 MG/DL — HIGH (ref 0–0.2)
BILIRUB INDIRECT FLD-MCNC: 0.3 MG/DL — SIGNIFICANT CHANGE UP (ref 0.2–1)
BILIRUB SERPL-MCNC: 0.6 MG/DL — SIGNIFICANT CHANGE UP (ref 0.2–1.2)
BUN SERPL-MCNC: 14 MG/DL — SIGNIFICANT CHANGE UP (ref 7–23)
CALCIUM SERPL-MCNC: 9 MG/DL — SIGNIFICANT CHANGE UP (ref 8.4–10.5)
CHLORIDE SERPL-SCNC: 95 MMOL/L — LOW (ref 96–108)
CO2 SERPL-SCNC: 29 MMOL/L — SIGNIFICANT CHANGE UP (ref 22–31)
CREAT SERPL-MCNC: 3.88 MG/DL — HIGH (ref 0.5–1.3)
EOSINOPHIL # BLD AUTO: 0.47 K/UL — SIGNIFICANT CHANGE UP (ref 0–0.5)
EOSINOPHIL NFR BLD AUTO: 4 % — SIGNIFICANT CHANGE UP (ref 0–6)
GLUCOSE SERPL-MCNC: 100 MG/DL — HIGH (ref 70–99)
HCT VFR BLD CALC: 29 % — LOW (ref 39–50)
HGB BLD-MCNC: 9.5 G/DL — LOW (ref 13–17)
INR BLD: 1.61 RATIO — HIGH (ref 0.88–1.16)
LYMPHOCYTES # BLD AUTO: 1.18 K/UL — SIGNIFICANT CHANGE UP (ref 1–3.3)
LYMPHOCYTES # BLD AUTO: 10 % — LOW (ref 13–44)
MAGNESIUM SERPL-MCNC: 1.9 MG/DL — SIGNIFICANT CHANGE UP (ref 1.6–2.6)
MANUAL SMEAR VERIFICATION: SIGNIFICANT CHANGE UP
MCHC RBC-ENTMCNC: 29 PG — SIGNIFICANT CHANGE UP (ref 27–34)
MCHC RBC-ENTMCNC: 32.8 GM/DL — SIGNIFICANT CHANGE UP (ref 32–36)
MCV RBC AUTO: 88.4 FL — SIGNIFICANT CHANGE UP (ref 80–100)
MONOCYTES # BLD AUTO: 1.53 K/UL — HIGH (ref 0–0.9)
MONOCYTES NFR BLD AUTO: 13 % — SIGNIFICANT CHANGE UP (ref 2–14)
NEUTROPHILS # BLD AUTO: 8.49 K/UL — HIGH (ref 1.8–7.4)
NEUTROPHILS NFR BLD AUTO: 67 % — SIGNIFICANT CHANGE UP (ref 43–77)
NEUTS BAND # BLD: 5 % — SIGNIFICANT CHANGE UP (ref 0–8)
NRBC # BLD: 0 /100 — SIGNIFICANT CHANGE UP (ref 0–0)
PHOSPHATE SERPL-MCNC: 2.7 MG/DL — SIGNIFICANT CHANGE UP (ref 2.5–4.5)
PLAT MORPH BLD: NORMAL — SIGNIFICANT CHANGE UP
PLATELET # BLD AUTO: 775 K/UL — HIGH (ref 150–400)
POTASSIUM SERPL-MCNC: 3.7 MMOL/L — SIGNIFICANT CHANGE UP (ref 3.5–5.3)
POTASSIUM SERPL-SCNC: 3.7 MMOL/L — SIGNIFICANT CHANGE UP (ref 3.5–5.3)
PROCALCITONIN SERPL-MCNC: 0.76 NG/ML — HIGH (ref 0.02–0.1)
PROT SERPL-MCNC: 6.3 G/DL — SIGNIFICANT CHANGE UP (ref 6–8.3)
PROTHROM AB SERPL-ACNC: 18.8 SEC — HIGH (ref 10–12.9)
RBC # BLD: 3.28 M/UL — LOW (ref 4.2–5.8)
RBC # FLD: 19.1 % — HIGH (ref 10.3–14.5)
RBC BLD AUTO: SIGNIFICANT CHANGE UP
SODIUM SERPL-SCNC: 135 MMOL/L — SIGNIFICANT CHANGE UP (ref 135–145)
WBC # BLD: 11.79 K/UL — HIGH (ref 3.8–10.5)
WBC # FLD AUTO: 11.79 K/UL — HIGH (ref 3.8–10.5)

## 2020-01-31 PROCEDURE — 99232 SBSQ HOSP IP/OBS MODERATE 35: CPT

## 2020-01-31 PROCEDURE — 99232 SBSQ HOSP IP/OBS MODERATE 35: CPT | Mod: GC

## 2020-01-31 RX ORDER — OXYCODONE HYDROCHLORIDE 5 MG/1
10 TABLET ORAL EVERY 6 HOURS
Refills: 0 | Status: DISCONTINUED | OUTPATIENT
Start: 2020-01-31 | End: 2020-02-05

## 2020-01-31 RX ORDER — OXYCODONE HYDROCHLORIDE 5 MG/1
5 TABLET ORAL EVERY 4 HOURS
Refills: 0 | Status: DISCONTINUED | OUTPATIENT
Start: 2020-01-31 | End: 2020-02-05

## 2020-01-31 RX ADMIN — OXYCODONE HYDROCHLORIDE 5 MILLIGRAM(S): 5 TABLET ORAL at 21:25

## 2020-01-31 RX ADMIN — OXYCODONE HYDROCHLORIDE 5 MILLIGRAM(S): 5 TABLET ORAL at 22:00

## 2020-01-31 RX ADMIN — GABAPENTIN 100 MILLIGRAM(S): 400 CAPSULE ORAL at 17:24

## 2020-01-31 RX ADMIN — BRIMONIDINE TARTRATE 1 DROP(S): 2 SOLUTION/ DROPS OPHTHALMIC at 14:47

## 2020-01-31 RX ADMIN — MEROPENEM 100 MILLIGRAM(S): 1 INJECTION INTRAVENOUS at 09:29

## 2020-01-31 RX ADMIN — BRIMONIDINE TARTRATE 1 DROP(S): 2 SOLUTION/ DROPS OPHTHALMIC at 21:25

## 2020-01-31 RX ADMIN — OXYCODONE HYDROCHLORIDE 10 MILLIGRAM(S): 5 TABLET ORAL at 06:40

## 2020-01-31 RX ADMIN — OXYCODONE HYDROCHLORIDE 10 MILLIGRAM(S): 5 TABLET ORAL at 14:46

## 2020-01-31 RX ADMIN — CHLORHEXIDINE GLUCONATE 1 APPLICATION(S): 213 SOLUTION TOPICAL at 09:29

## 2020-01-31 RX ADMIN — APIXABAN 5 MILLIGRAM(S): 2.5 TABLET, FILM COATED ORAL at 17:25

## 2020-01-31 RX ADMIN — GABAPENTIN 100 MILLIGRAM(S): 400 CAPSULE ORAL at 06:03

## 2020-01-31 RX ADMIN — Medication 81 MILLIGRAM(S): at 11:43

## 2020-01-31 RX ADMIN — BRIMONIDINE TARTRATE 1 DROP(S): 2 SOLUTION/ DROPS OPHTHALMIC at 06:03

## 2020-01-31 RX ADMIN — OXYCODONE HYDROCHLORIDE 10 MILLIGRAM(S): 5 TABLET ORAL at 06:10

## 2020-01-31 RX ADMIN — LATANOPROST 1 DROP(S): 0.05 SOLUTION/ DROPS OPHTHALMIC; TOPICAL at 11:43

## 2020-01-31 RX ADMIN — Medication 1 TABLET(S): at 11:43

## 2020-01-31 RX ADMIN — Medication 12.5 MILLIGRAM(S): at 21:24

## 2020-01-31 RX ADMIN — OXYCODONE HYDROCHLORIDE 5 MILLIGRAM(S): 5 TABLET ORAL at 12:41

## 2020-01-31 RX ADMIN — Medication 12.5 MILLIGRAM(S): at 06:03

## 2020-01-31 RX ADMIN — APIXABAN 5 MILLIGRAM(S): 2.5 TABLET, FILM COATED ORAL at 06:03

## 2020-01-31 RX ADMIN — OXYCODONE HYDROCHLORIDE 10 MILLIGRAM(S): 5 TABLET ORAL at 15:46

## 2020-01-31 RX ADMIN — Medication 100 MILLIGRAM(S): at 11:43

## 2020-01-31 RX ADMIN — OXYCODONE HYDROCHLORIDE 5 MILLIGRAM(S): 5 TABLET ORAL at 11:41

## 2020-01-31 RX ADMIN — MICAFUNGIN SODIUM 105 MILLIGRAM(S): 100 INJECTION, POWDER, LYOPHILIZED, FOR SOLUTION INTRAVENOUS at 15:01

## 2020-01-31 RX ADMIN — AMIODARONE HYDROCHLORIDE 200 MILLIGRAM(S): 400 TABLET ORAL at 06:03

## 2020-01-31 NOTE — PROGRESS NOTE ADULT - ASSESSMENT
49 year old male with ESRD on HD, now s/p ddrt on 12/8/2019 course complicated by TMA/profound ATN, who presents from a HD center with a fever and cough. He was initially admitted with influenza. He is s/p washout of infected collection and biopsy which revealed ATN.  He was found to have diffusely enlarging perinephric fluid collection.    While on telemetry, he had an episode of a wide complex tachycardia. It initially appears irregular, suggesting an atrial arrhythmia with aberrancy, though the remainder appears more like an episode of non-sustained VT.   s/p washout on 1/1/2020 with worsening sepsis and transfer to ICU, s/p IR drainage of perinephric collection on 1/6. 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 He underwent operative repair of right external iliac artery with pericardial patch, transplant nephrectomy, RLE thrombectomy, and IVC filter on 1/9.     s/p corrina ->tachy arrest with tdp, af rvr, now on amio, with large PE    - developed malignant arrhythmias which seemed to start with bradycardia, leading to more tachycardia and bursts of VT and rapid AF.  The apparent af degenerated into torsades.  He was shocked and has been maintaining SR since  - cont amio at 200 daily.   - cont metoprolol as tolerated by BP. Can give on non HD days and see how bp reacts on HD.   - repeat ekg daily to evaluate qtc, while on antifungals/amiodarone  - arrhythmias were most likely triggered by the massive vte event, are unlikely to represent a primary cardiac issue  - hs trop noted, though in the context of his arrhythmias, and right heart strain from the pe, the trop is more likely to reflect demand than an acs event  - cont asa     - now on apixiban  - echo with preserved/hyperdynamic lv, with RV failure consistent with acute RV overload  - CTPA on 1/21 demonstrated some resolution of PE and evolving pulmonary infarcts.   - Further cardiac workup will depend on clinical course.  - will follow with you

## 2020-01-31 NOTE — PROGRESS NOTE ADULT - ATTENDING COMMENTS
Pt still with edema  On anticoagulation for DVT/PE  Now walking  Plan for d/c to rehab early next week.

## 2020-01-31 NOTE — PROGRESS NOTE ADULT - ASSESSMENT
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 RVP with Influenza A s/p Tamiflu treatment course. Now with infected carlos-transplant hematoma.    1/1: s/p Ex-Lap and washout of infected hematoma (Enterobacter from cultures)  1/6: s/p IR guided drainage attempt of hematoma on 1/6 (NGTD from cultures)  1/10: s/p transplant nephrectomy, repair of right external iliac artery with a bovine pericardial patch, repair of right external iliac vein and thrombectomy of right lower extremity veins (Surgical cultures with Enterobacter)  1/15: s/p IR guided drainage (220cc of hematoma)  1/22: s/p IR drain repositioning and upsizing (Gram stain with GNR but so far NGTD)    PEA arrest and Torsades on 1/13  CT C/A/P (1/13) with Saddle PE, interval increase in size of RP fluid collection (infected hematoma)  CT C/A/P (1/21) with pulmonary infarcts and residual hematoma (decreased in size)    Some of the Enterobacter isolates R and S to Ertapenem  per core lab Ertapenem at borderline of Susceptible and Resistant breakpoint  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   Given concern for intravascular infection I would treat for 6 weeks from 1/10 nephrectomy (End Date: 2/20/20)  I would continue Meropenem while admitted and switch to Ciprofloxacin on discharge    Fevers resolved, leukocytosis stable and procalcitonin decreasing.   Fevers & leukocytosis likely secondary to either pulmonary infarcts or from residual hematoma/collection.   Fluid sampling from 1/22 drain repositioning / upsize with GNR on gram stain.   Suspect it represents dead Enterobacter organisms    Overall, Leukocytosis (stable), Fever (resolving), Infected Hematoma, Saddle pulmonary embolus, Positive Urine Culture, Renal Transplant Recipient    --Continue Meropenem 500 mg IV Q24H (anticipate switch to Cipro PO on discharge - End Date: 2/20/20)  --Would consider discontinuing Micafungin - no evidence of yeast in prior cultures.   --Continue to follow CBC with diff  --Continue to follow temperature curve  --Follow up on preliminary blood cultures   --Follow up on preliminary 1/22 and 1/24 IR cultures   --Management of Saddle PE per Transplant and vascular teams    I will be away over this upcoming weekend. Please contact the Infectious Diseases Office with any further questions or concerns.     Dada Jacobo M.D.  [facility] Division of Infectious Disease  8AM-5PM: Pager Number [phone]  After Hours (or if no response): Please contact the Infectious Diseases Office at [phone]

## 2020-01-31 NOTE — CHART NOTE - NSCHARTNOTEFT_GEN_A_CORE
Nutrition Follow Up Note  Patient seen for: nutrition follow-up    Hospital course as per chart: Patient is a 50 yo male with PMH of ESRD on HD, HTN, gout, glaucoma, RA with hand contractures, who underwent DDRT on 19, admitted with fever. Chart reviewed, events noted. Pt underwent graft nephrectomy on 1/10/20. Currently on HD. Last HD yesterday (), 2 L of fluid removed.    Source: chart review    Diet: Renal, Low Sodium + Nepro 2x/day    Patient reports good appetite and intake, consuming 100% of meals provided in-house. Pt endorses drinking 1-2 Nepro daily, however reports yesterday and today with multiple bowel movements, attributes to Nepro. Last BM today () as per pt (confirmed by flowsheets). Denies nausea/vomiting/diarrhea/constipation.     Encouraged continued good PO intake and consumption of Nepro as tolerated. Discussed sipping Nepro throughout the day to minimize GI distress (rather than consuming 1 bottle quickly). Discussed alternative flavor of Nepro, pt declined at this time. Educated pt on Nepro oral nutrition supplement and it's provision of supplemental calories, protein, and nutrients. Emphasized importance of adequate calories and protein and increased calorie and protein needs in setting of dialysis treatment. Pt declined review of Renal diet education at this time. Pt amenable to recommendations and made aware RD remains available.     PO intake:  %    Source for PO intake: patient    Enteral /Parenteral Nutrition: N/A     Current Weight: 199.7 pounds ( post-HD weight)  % Weight Change: noted pt with fluctuating weights throughout admission, likely related to fluid shifts (pt on HD) vs. scale differences, will continue to monitor    Pertinent Medications: MEDICATIONS  (STANDING):  allopurinol 100 milliGRAM(s) Oral daily  aMIOdarone    Tablet 200 milliGRAM(s) Oral daily  apixaban 5 milliGRAM(s) Oral two times a day  aspirin  chewable 81 milliGRAM(s) Oral daily  brimonidine 0.2% Ophthalmic Solution 1 Drop(s) Both EYES three times a day  chlorhexidine 2% Cloths 1 Application(s) Topical <User Schedule>  epoetin trey Injectable 70972 Unit(s) IV Push <User Schedule>  gabapentin 100 milliGRAM(s) Oral every 12 hours  latanoprost 0.005% Ophthalmic Solution 1 Drop(s) Both EYES daily  meropenem  IVPB 500 milliGRAM(s) IV Intermittent every 12 hours  metoprolol tartrate 12.5 milliGRAM(s) Oral two times a day  micafungin IVPB 100 milliGRAM(s) IV Intermittent every 24 hours  Nephro-candace 1 Tablet(s) Oral daily    MEDICATIONS  (PRN):  acetaminophen   Tablet .. 975 milliGRAM(s) Oral every 6 hours PRN Mild Pain (1 - 3)  oxyCODONE    IR 10 milliGRAM(s) Oral every 6 hours PRN Severe Pain (7 - 10)  oxyCODONE    IR 5 milliGRAM(s) Oral every 4 hours PRN Moderate Pain (4 - 6)    Pertinent Labs:   Na135 mmol/L Glu 100 mg/dL<H> K+ 3.7 mmol/L Cr  3.88 mg/dL<H> BUN 14 mg/dL  Phos 2.7 mg/dL  Alb 2.4 g/dL<L>  [id number] 5.2 %    Skin: stage II pressure injury to L buttock as per flowsheets (entered on )  Edema: +2 generalized edema, +3 edema to b/l legs, b/l ankles, b/l feet as per flowsheets    Estimated Needs: recalculated, based on dosing weight of 63.5 kg, defer fluid needs to team at this time   30-35 kcal/k9828-2305 kcal/day  1.2-1.4 g protein/k-89 g protein/day    Previous Nutrition Diagnosis: increased nutrient needs   Nutrition Diagnosis is ongoing - pt now with stage II pressure injury; being addressed with oral nutrition supplement (Nepro 2x/day)    Previous Nutrition Diagnosis: limited adherence to nutrition related recommendations   Nutrition Diagnosis is ongoing - reviewed and reinforced importance of adequate protein and calorie intake in setting of HD treatment and wound healing; pt declined further education at this time    New Nutrition Diagnosis: not applicable    Interventions:   Recommend  1) Continue Renal diet (may d/c Low Sodium as Low Sodium restriction included in Renal diet) - monitor/adjust as needed  2) Continue Nepro 2x/day (425 kcal, 19 g protein) to optimize intake  3) Continue to obtain pre- and post-HD weights to trend  4) Encouraged continued good PO intake and stressed importance of adequate calorie and protein intake; pt made aware RD remains available     Monitoring and Evaluation: Monitor PO intake, weight, labs, skin, GI status, diet     RD remains available upon request and will follow up per protocol.   Jessica Abreu MS, RD, CDN Pager #[phone] Nutrition Follow Up Note  Patient seen for: nutrition follow-up    Hospital course as per chart: Patient is a 48 yo male with PMH of ESRD on HD, HTN, gout, glaucoma, RA with hand contractures, who underwent DDRT on 19, admitted with fever. Chart reviewed, events noted. Pt underwent graft nephrectomy on 1/10/20. Currently on HD. Last HD yesterday (), 2 L of fluid removed.    Source: chart review    Diet: Renal, Low Sodium + Nepro 2x/day    Patient reports good appetite and intake, consuming 100% of meals provided in-house. Pt endorses drinking 1-2 Nepro daily, however reports yesterday and today with multiple bowel movements, attributes to Nepro. Last BM today () as per pt (confirmed by flowsheets). Denies nausea/vomiting/diarrhea/constipation.     Encouraged continued good PO intake and consumption of Nepro as tolerated. Discussed sipping Nepro throughout the day to minimize GI distress (rather than consuming 1 bottle quickly). Discussed alternative flavor of Nepro, pt declined at this time. Educated pt on Nepro oral nutrition supplement and it's provision of supplemental calories, protein, and nutrients. Emphasized importance of adequate calories and protein and increased calorie and protein needs in setting of dialysis treatment. Pt declined review of Renal diet education at this time. Pt amenable to recommendations and made aware RD remains available.     PO intake:  %    Source for PO intake: patient    Enteral /Parenteral Nutrition: N/A     Current Weight: 199.7 pounds ( post-HD weight)  % Weight Change: noted pt with fluctuating weights throughout admission, likely related to fluid shifts (pt on HD) vs. scale differences, will continue to monitor    Pertinent Medications: MEDICATIONS  (STANDING):  allopurinol 100 milliGRAM(s) Oral daily  aMIOdarone    Tablet 200 milliGRAM(s) Oral daily  apixaban 5 milliGRAM(s) Oral two times a day  aspirin  chewable 81 milliGRAM(s) Oral daily  brimonidine 0.2% Ophthalmic Solution 1 Drop(s) Both EYES three times a day  chlorhexidine 2% Cloths 1 Application(s) Topical <User Schedule>  epoetin trey Injectable 32732 Unit(s) IV Push <User Schedule>  gabapentin 100 milliGRAM(s) Oral every 12 hours  latanoprost 0.005% Ophthalmic Solution 1 Drop(s) Both EYES daily  meropenem  IVPB 500 milliGRAM(s) IV Intermittent every 12 hours  metoprolol tartrate 12.5 milliGRAM(s) Oral two times a day  micafungin IVPB 100 milliGRAM(s) IV Intermittent every 24 hours  Nephro-candace 1 Tablet(s) Oral daily    MEDICATIONS  (PRN):  acetaminophen   Tablet .. 975 milliGRAM(s) Oral every 6 hours PRN Mild Pain (1 - 3)  oxyCODONE    IR 10 milliGRAM(s) Oral every 6 hours PRN Severe Pain (7 - 10)  oxyCODONE    IR 5 milliGRAM(s) Oral every 4 hours PRN Moderate Pain (4 - 6)    Pertinent Labs:   Na135 mmol/L Glu 100 mg/dL<H> K+ 3.7 mmol/L Cr  3.88 mg/dL<H> BUN 14 mg/dL  Phos 2.7 mg/dL  Alb 2.4 g/dL<L>  [id number] 5.2 %    Skin: stage II pressure injury to L buttock as per flowsheets (entered on )  Edema: +2 generalized edema, +3 edema to b/l legs, b/l ankles, b/l feet as per flowsheets    Estimated Needs: recalculated, based on dosing weight of 63.5 kg, defer fluid needs to team at this time   30-35 kcal/k9821-8259 kcal/day  1.2-1.4 g protein/k-89 g protein/day    Previous Nutrition Diagnosis: increased nutrient needs   Nutrition Diagnosis is ongoing - pt now with stage II pressure injury; being addressed with oral nutrition supplement (Nepro 2x/day)    Previous Nutrition Diagnosis: limited adherence to nutrition related recommendations   Nutrition Diagnosis is ongoing - reviewed and reinforced importance of adequate protein and calorie intake in setting of HD treatment and wound healing; pt declined further education at this time    New Nutrition Diagnosis: not applicable    Interventions:   Recommend  1) Continue Renal diet (may d/c Low Sodium as Low Sodium restriction included in Renal diet) - monitor/adjust as needed  2) Continue Nepro 2x/day (425 kcal, 19 g protein) to optimize intake  3) Continue Nephrovite  4) Continue to obtain pre- and post-HD weights to trend  5) Encouraged continued good PO intake and stressed importance of adequate calorie and protein intake; pt made aware RD remains available     Monitoring and Evaluation: Monitor PO intake, weight, labs, skin, GI status, diet     RD remains available upon request and will follow up per protocol.   Jessica Abreu MS, RD, CDN Pager #[phone]

## 2020-01-31 NOTE — PROGRESS NOTE ADULT - SUBJECTIVE AND OBJECTIVE BOX
[facility] DIVISION OF KIDNEY DISEASES AND HYPERTENSION -- FOLLOW UP NOTE  --------------------------------------------------------------------------------  HPI: 49 yr old man with ESRD s/p DDRT in Dec 2019 complicated by DGF due to ATN and early TMA thought to be due to prograf. Hospital course was further complicated by infected perinephric hematoma, dehiscence of the ureter-bladder anastomosis as well as vascular anastomosis and underwent graft nephrectomy on 1/10/20. Course further complicated by cardiac arrest 2/2 saddle PE. Pt. is now stable and on medical floors. Currently with volume overload, on HD (with last treatment on 1/30/20), with 2L of UF tolerated.    Pt. seen and examined at bedside this AM. Pt. feels improved daily.    PAST HISTORY  --------------------------------------------------------------------------------  No significant changes to PMH, PSH, FHx, SHx, unless otherwise noted    ALLERGIES & MEDICATIONS  --------------------------------------------------------------------------------  Allergies    coconut (Anaphylaxis)  morphine (Pruritus; Rash)    Intolerances      Standing Inpatient Medications  allopurinol 100 milliGRAM(s) Oral daily  aMIOdarone    Tablet 200 milliGRAM(s) Oral daily  apixaban 5 milliGRAM(s) Oral two times a day  aspirin  chewable 81 milliGRAM(s) Oral daily  brimonidine 0.2% Ophthalmic Solution 1 Drop(s) Both EYES three times a day  chlorhexidine 2% Cloths 1 Application(s) Topical <User Schedule>  epoetin trey Injectable 15960 Unit(s) IV Push <User Schedule>  gabapentin 100 milliGRAM(s) Oral every 12 hours  latanoprost 0.005% Ophthalmic Solution 1 Drop(s) Both EYES daily  meropenem  IVPB 500 milliGRAM(s) IV Intermittent every 12 hours  metoprolol tartrate 12.5 milliGRAM(s) Oral two times a day  micafungin IVPB 100 milliGRAM(s) IV Intermittent every 24 hours  Nephro-candace 1 Tablet(s) Oral daily    REVIEW OF SYSTEMS  --------------------------------------------------------------------------------  Gen: No lethargy  Respiratory: No dyspnea  CV: No chest pain  GI: No abdominal pain  MSK: + LE edema  Neuro: No dizziness  Heme: No bleeding    All other systems were reviewed and are negative, except as noted.    VITALS/PHYSICAL EXAM  --------------------------------------------------------------------------------  T(C): 36.7 (01-31-20 @ 05:59), Max: 37.2 (01-31-20 @ 01:50)  HR: 69 (01-31-20 @ 05:59) (68 - 80)  BP: 138/80 (01-31-20 @ 05:59) (93/53 - 138/80)  RR: 18 (01-31-20 @ 05:59) (18 - 18)  SpO2: 98% (01-31-20 @ 05:59) (92% - 99%)  Wt(kg): --    01-30-20 @ 07:01  -  01-31-20 @ 07:00  --------------------------------------------------------  IN: 1690 mL / OUT: 2990 mL / NET: -1300 mL    Physical Exam:  	Gen: NAD, resting  	HEENT: supple neck  	Pulm: decreased breath sounds bilaterally  	CV: RRR, S1S2; no rub  	Abd: +BS, soft, RLQ with drain in place  	: scrotal edema  	UE: RUE swelling (improved)  	LE: b/l pitting edema (mildly improved)  LABS/STUDIES  --------------------------------------------------------------------------------              9.5    11.79 >-----------<  775      [01-31-20 @ 06:24]              29.0     135  |  95  |  14  ----------------------------<  100      [01-31-20 @ 06:23]  3.7   |  29  |  3.88        Ca     9.0     [01-31-20 @ 06:23]      Mg     1.9     [01-31-20 @ 06:23]      Phos  2.7     [01-31-20 @ 06:23]    Creatinine Trend:  SCr 3.88 [01-31 @ 06:23]  SCr 5.23 [01-30 @ 14:05]  SCr 6.65 [01-28 @ 06:20]  SCr 5.42 [01-27 @ 06:39]  SCr 4.16 [01-26 @ 06:07]

## 2020-01-31 NOTE — PROGRESS NOTE ADULT - SUBJECTIVE AND OBJECTIVE BOX
Transplant Surgery - Multidisciplinary Rounds  --------------------------------------------------------------  R DDRT    Date:  12/8/19     Present:   Patient seen with multidisciplinary team including Transplant Surgeon: Dr. Be, Dr. Velásquez, Dr. Espinal Transplant Nephrologist: Dr. Puckett and renal fellow, Pharmacist: Joi, NP/PAs: Stalin in am rounds and examined with Dr. Joy. Disciplines not in attendance will be notified of the plan.       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 Young, followed by Dr. Raphael, [facility] Oncologist), RA with hand contractures. He underwent DDRT on 12/8/19. Post op course c/b DGF requiring HD, thrombocytopenia, elevated LDH and haptoglobin. Schistocytes  on peripheral smear concerning for TMA. Envarsus held. Received Belatacept 12/13. Started on PLEX (12/12, 12/15). Underwent renal bx on 12/13 c/w profound ATN.  Discharged home on 12/20/19 w/ outpatient HD.     Re-admitted 12/27 with influenza, completed treatement with Tamiflu on 12/31. Urine cx on admission grew Ent cloacae, was started on meropenem 12/31. Renal US on admission with increasing hematoma.   ·	OR 1/1 for ex-lap, hematoma evacuation, washout and renal bx. (c/w ATN)  OR cxs grew CRE and e coli. Post op course c/b:   ·	fevers and leukocytosis, CT a/p (1/4) with increased perinephric collection. Underwent IR guided collection 1/6   ·	New onset hematuria, martinez inserted 1/7, started CBI  ·	RLE edema, RLE doppler (1/8) with acute above the knee thrombus of R external iliac, common femoral and femoral veins, Acute calf vein thrombus affecting R soleal vein, Heparin gtt initiated   ·	AMS 1/9  (head CT neg), hyponatremic  ·	Significant hematuria and bleeding around the martinez. Angio 1/9 showed actively bleeding pseudoaneurysm at the anastomosis of the transplant renal artery with the recipient iliac artery. A stent was placed in the iliac artery to  control hemorrhage. IVC filter placed.   ·	Emergent OR 1/9-1/10 for graft nephrectomy;  ureter to bladder anastomosis was completely disrupted, repaired the R external iliac artery with bovine pericardial patch, repaired right external iliac vein, performed thrombectomy of RLE veins, Intra op received 3u PRBC.   ·	Extubated 1/11  ·	1/13 Saddle PE, with right heart strain and complicated by PEA arrest followed by [place], ROSC achieved after ACLS; re-intubated  ·	1/15 Renal US with 13.7 x 5.0 x 1.5 cm in RLQ transplant bed; s/p bedside IR guided drainage, pigtail in place. Fluid cx to date with NG  ·	1/21 CT C/A/P showing persistent collection, catheter at superior portion of collection  ·	1/22 IR pigtail drain repositioned and upsized to 12Fr catheter w/ increased output  ·	1/23 Transitioned to Eliquis from Heparin drip    Interval events:    -Afebrile, stable vitals.  -HD T/ TH/ S as per nephrology. Labs drawn every 48 hours w/ HD  - metoprolol to be held on HD days 2/2 hypotension   -LE w/ minimal improvement in edema.   -improvement with ambulation 1 person assist w walker.   - Plts trending down today 775 Heme following   -Hemolysis work-up in progress, will f/u heme recs.  -On meropenem and micafungin.      cxs:   12/29: Urine Cx: Enterobacter Cloacae  1/1 OR Perinephric collection Cx:  Carbapenem resistant Ent Cloacae, E. Coli  1/1: R kidney abscess swab: Enterobacter Cloacae  1/1  OR tissue: Enterobacter Cloacae  1/5: Urine Cx: Enteropbacter Cloacae  1/5: Skin incision Cx (bedside): NG  1/6: Adominal fluid Cx from IR:  NG   1/7: Urine Cx:  NG  1/7: BCX2:  NG  1/10 retroperit hematoma - enerobacter   1/10 bladder hematoma - enterobacter  1/15 Drainage fluid - NGTD  1/16: BC X2: NGTD  1/18: SPutum: Normal harvey  1/21: BC X2: NTGD  1/22: IR body fluid Cx: GNR pre-milian    Potential Discharge date: pending clinical improvement       MEDICATIONS  (STANDING):  allopurinol 100 milliGRAM(s) Oral daily  aMIOdarone    Tablet 200 milliGRAM(s) Oral daily  apixaban 5 milliGRAM(s) Oral two times a day  aspirin  chewable 81 milliGRAM(s) Oral daily  brimonidine 0.2% Ophthalmic Solution 1 Drop(s) Both EYES three times a day  chlorhexidine 2% Cloths 1 Application(s) Topical <User Schedule>  epoetin trey Injectable 03779 Unit(s) IV Push <User Schedule>  gabapentin 100 milliGRAM(s) Oral every 12 hours  latanoprost 0.005% Ophthalmic Solution 1 Drop(s) Both EYES daily  meropenem  IVPB 500 milliGRAM(s) IV Intermittent every 12 hours  metoprolol tartrate 12.5 milliGRAM(s) Oral two times a day  micafungin IVPB 100 milliGRAM(s) IV Intermittent every 24 hours  Nephro-candace 1 Tablet(s) Oral daily    MEDICATIONS  (PRN):  acetaminophen   Tablet .. 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  Carpal tunnel syndrome  Brain cyst: had MRI recently- now gone  Anemia  Gastric ulcer: Long time ago  Rheumatoid arthritis  Renal transplant recipient  Breakdown (mechanical) of penile (implanted) prosthesis, sequela  End-stage renal disease (ESRD): PERMACATH PLACEMENT  Abscess of left buttock: s/p I &amp; D  AV fistula: placement left lower arm  S/P cystoscopy: stent placement &amp; removal for kidney stones, lithiotripsy  s/p Lt Carpal tunnel: 2011      Vital Signs Last 24 Hrs  T(C): 36.7 (31 Jan 2020 05:59), Max: 37.2 (31 Jan 2020 01:50)  T(F): 98.1 (31 Jan 2020 05:59), Max: 99 (31 Jan 2020 01:50)  HR: 69 (31 Jan 2020 05:59) (68 - 80)  BP: 138/80 (31 Jan 2020 05:59) (93/53 - 138/80)  BP(mean): --  RR: 18 (31 Jan 2020 05:59) (18 - 18)  SpO2: 98% (31 Jan 2020 05:59) (92% - 99%)    I&O's Summary    30 Jan 2020 07:01  -  31 Jan 2020 07:00  --------------------------------------------------------  IN: 1690 mL / OUT: 2990 mL / NET: -1300 mL                              9.5    11.79 )-----------( 775      ( 31 Jan 2020 06:24 )             29.0     01-31    135  |  95<L>  |  14  ----------------------------<  100<H>  3.7   |  29  |  3.88<H>    Ca    9.0      31 Jan 2020 06:23  Phos  2.7     01-31  Mg     1.9     01-31    TPro  6.3  /  Alb  2.4<L>  /  TBili  0.6  /  DBili  0.3<H>  /  AST  24  /  ALT  <5<L>  /  AlkPhos  287<H>  01-31                     REVIEW OF SYSTEMS  Gen: + weakness   Skin: No rashes  Head/Eyes/Ears/Mouth: No headache; Normal hearing; Normal vision w/o blurriness; No sinus pain/discomfort, sore throat  Respiratory: no SOB  CV: No chest pain, PND, orthopnea  GI: incisional tenderness   : anuric  MSK: + lower extremity edema, RUE edema, flank edema  Neuro: No dizziness/lightheadedness, weakness, seizures, numbness, tingling  Heme: No easy bruising or bleeding  Endo: No heat/cold intolerance  Psych: No significant nervousness, anxiety, stress, depression      PHYSICAL EXAM:    Constitutional: NAD  	Eyes: Anicteric, PERRLA  	Respiratory: CTA b/l  	Cardiovascular: RRR  	Gastrointestinal: Soft abdomen, appropriate incisional TTP, ND.  wound vac ss/murky output (wound seen during recent wound vac change, good granulation tissue developing), RLQ drain with SS drainage    Genitourinary:  anuric, large amount scrotal edema     Extremities: b/l 2-3+ lower ext edema ace wrapped (slightly improved since yesterday); no ulcerations or skin breakdown, flank edema, RUE edema slow improvement   	Vascular: DP signal bilateral, L AVF palpable  	Neurological: A&O x3.  	Skin: no new lesions/ulcerations  	Musculoskeletal: Moving all extremities

## 2020-01-31 NOTE — PROGRESS NOTE ADULT - SUBJECTIVE AND OBJECTIVE BOX
No complaints    Vital Signs Last 24 Hrs  T(C): 36.8 (01-31-20 @ 14:45), Max: 37.2 (01-31-20 @ 01:50)  T(F): 98.3 (01-31-20 @ 14:45), Max: 99 (01-31-20 @ 01:50)  HR: 65 (01-31-20 @ 14:45) (63 - 80)  BP: 111/68 (01-31-20 @ 14:45) (93/53 - 138/80)  RR: 20 (01-31-20 @ 14:45) (18 - 20)  SpO2: 97% (01-31-20 @ 14:45) (92% - 99%)    Card S1S2  Lungs b/l air entry  Abd soft  Extr +edema b/l LEBRON AGUILAR  AVF patent       AO                                                                                                                                                                                                           9.5    11.79 )-----------( 775      ( 31 Jan 2020 06:24 )             29.0     31 Jan 2020 06:23    135    |  95     |  14     ----------------------------<  100    3.7     |  29     |  3.88     Ca    9.0        31 Jan 2020 06:23  Phos  2.7       31 Jan 2020 06:23  Mg     1.9       31 Jan 2020 06:23    TPro  6.3    /  Alb  2.4    /  TBili  0.6    /  DBili  0.3    /  AST  24     /  ALT  <5     /  AlkPhos  287    31 Jan 2020 06:23    LIVER FUNCTIONS - ( 31 Jan 2020 06:23 )  Alb: 2.4 g/dL / Pro: 6.3 g/dL / ALK PHOS: 287 U/L / ALT: <5 U/L / AST: 24 U/L / GGT: x           PT/INR - ( 31 Jan 2020 06:24 )   PT: 18.8 sec;   INR: 1.61 ratio      acetaminophen   Tablet .. 975 milliGRAM(s) Oral every 6 hours PRN  allopurinol 100 milliGRAM(s) Oral daily  aMIOdarone    Tablet 200 milliGRAM(s) Oral daily  apixaban 5 milliGRAM(s) Oral two times a day  aspirin  chewable 81 milliGRAM(s) Oral daily  brimonidine 0.2% Ophthalmic Solution 1 Drop(s) Both EYES three times a day  chlorhexidine 2% Cloths 1 Application(s) Topical <User Schedule>  epoetin trey Injectable 29873 Unit(s) IV Push <User Schedule>  gabapentin 100 milliGRAM(s) Oral every 12 hours  latanoprost 0.005% Ophthalmic Solution 1 Drop(s) Both EYES daily  meropenem  IVPB 500 milliGRAM(s) IV Intermittent every 12 hours  metoprolol tartrate 12.5 milliGRAM(s) Oral two times a day  micafungin IVPB 100 milliGRAM(s) IV Intermittent every 24 hours  Nephro-candace 1 Tablet(s) Oral daily  oxyCODONE    IR 10 milliGRAM(s) Oral every 6 hours PRN  oxyCODONE    IR 5 milliGRAM(s) Oral every 4 hours PRN    A/P:    Hx ESRD on HD, s/p DDRT 12/8/19, DGF  S/p transplant kidney bx 12/13: ATN, focal TMA  S/p PLEX 12/13, 12/15; d/c-d 12/20 w/op HD 3 x wk  Re-adm 12/27 w/Flu A, infected carlos-nephric hematoma   S/p OR 1/1 for washout and repeat renal graft biopsy (c/w ATN)  Dx w/RLE DVT  Developed gross hematuria  S/p OR 1/10 for infected carlos-nephric hematoma, hemorrhage involving pseudo-aneurysm of anastomosis of renal artery to external iliac vein,   s/p transplant nephrectomy, s/p IVCF   S/p PEA arrest 1/13, dx w/PE, R retroperitoneal collection  S/p IR drainage of abd collection  RUE DVT  Abx per ID  HD TTS  TMP as able  Epogen w/HD 3 x week  PT

## 2020-01-31 NOTE — PROGRESS NOTE ADULT - PROBLEM SELECTOR PLAN 1
Last HD treatment on 1/30/20, with 2L of UF tolerated. Follows with nephrologist, Dr Amaya. HD/PUF per primary nephrology team.

## 2020-01-31 NOTE — PROGRESS NOTE ADULT - PROBLEM SELECTOR PLAN 4
Leukocytosis improved at 11K today. On Meropenem and Micafungin. To be discharged (when stable) with ciprofloxacin. ID is following.

## 2020-01-31 NOTE — PROGRESS NOTE ADULT - ASSESSMENT
49M PMHx of ESRD on HD  ( via LUE AVF), HTN, Gout, Glaucoma, NET of pancreas, RA with hand contractures. s/p DDRT 12/8/19,  Post op course c/b DGF requiring HD. Renal bx x 2 c/w profound ATN.     Re-admitted with Influenza and perinephric hematoma. s/p hematoma evacuation/ abd washout and renal biopsy. Post op course c/b leukocytosis, infected hematoma, acute RLE DVT, and bleeding pseudoaneurysm at txp renal artery anastomosis. Now s/p graft nephrectomy. Course further complicated by saddle PE causing right heart strain and hemodynamic instability leading to cardiac arrest requiring ACLS, Required IR drainage of post-op collections x2. Transferred to floor on 1/25/2020.     [] DDRT c/b DGF/ATN/perinephric infected hematoma/pseudoneurysm/graft nephrectomy   - HD per nephrology team now T/ TH/ S  -  will plan for holding BB on HD days 2/2 low bp   - s/p bedside IR drainage of RLQ fluid collection (1/15), drain repositioned 1/22 and cultures pending result this far negative  - FU final IR culture, fungal culture from 1/22- pending.   - Repeat chest/abd/pelvis CT 1/28- noted with continued resolution of right lower quadrant fluid collection. Persistent bilateral lower lobe pulmonary emboli.  - ID following;  cont Meropenem, Micafungin 100mg daily.   ID plan for discharge on cipro.  - pain control  - bowel regimen  - PT/OT/OOB.  VAC change per PT  - Keep B/L LE bandaged with ace wrap and skin care  - S/P 2 u PRBC for drop in H&H with improvement today H&H 9.5/29    [] Saddle PE, with right heart strain and complicated by PEA arrest followed by Torsade, ROSC achieved after ACLS  - vascular, CT and vascular cardiology following   -Repeat chest CT 1/28 noted Persistent bilateral lower lobe pulmonary emboli.   - CT 1/21: There are pulmonary emboli within the right lower lobar arteries and the left lower lobar arteries extending into the distal segments as seen on prior study. The pulmonary emboli seen in the right and left pulmonary arteries are normal longer visualized. New distal to mid brachial DVT noted  1/19 Duplex: RUE Deep vein thrombosis in the mid to distal brachial vein.  - Continue Eliquis to 5mg PO BID   - continue ASA  - repeat ECHO with improved R heart strain    [] New Afib/Vtech  Cardiology following, BB low dose in the setting of WCT and hold BB on HD days  Continue amiodarone, card plan to continue for couple of months and will f/u as outpt    [] New acute DVT of R distal brachia vein   - continue anticoagulation     [] New acute DVT of R external iliac, common femoral, femoral veins, R soleal vein requiring heparin drip  - IVC filter placed 1/9, continue anticoagulation     [] Thrombocytosis  - rising INR/PLT counts over the past week.   -Heme consulted; likely reactive. Heme work up in progress    [] Dispo  - continue tele  -discharge planning in progress d/c to acute rehab once medically stable.

## 2020-01-31 NOTE — PROGRESS NOTE ADULT - SUBJECTIVE AND OBJECTIVE BOX
Follow Up:  infected hematoma    Interval History: no chills or fevers. no n/v/d or abdominal pain.     REVIEW OF SYSTEMS  [  ] ROS unobtainable because:    [x  ] All other systems negative except as noted below    Constitutional:  [ ] fever [ ] chills  [ ] weight loss  [ ] weakness  Skin:  [ ] rash [ ] phlebitis	  Eyes: [ ] icterus [ ] pain  [ ] discharge	  ENMT: [ ] sore throat  [ ] thrush [ ] ulcers [ ] exudates  Respiratory: [ ] dyspnea [ ] hemoptysis [ ] cough [ ] sputum	  Cardiovascular:  [ ] chest pain [ ] palpitations [ ] edema	  Gastrointestinal:  [ ] nausea [ ] vomiting [ ] diarrhea [ ] constipation [ ] pain	  Genitourinary:  [ ] dysuria [ ] frequency [ ] hematuria [ ] discharge [ ] flank pain  [ ] incontinence  Musculoskeletal:  [ ] myalgias [ ] arthralgias [ ] arthritis  [ ] back pain  Neurological:  [ ] headache [ ] seizures  [ ] confusion/altered mental status    Allergies  coconut (Anaphylaxis)  morphine (Pruritus; Rash)        ANTIMICROBIALS:  meropenem  IVPB 500 every 12 hours  micafungin IVPB 100 every 24 hours      OTHER MEDS:  MEDICATIONS  (STANDING):  acetaminophen   Tablet .. 975 every 6 hours PRN  allopurinol 100 daily  aMIOdarone    Tablet 200 daily  apixaban 5 two times a day  aspirin  chewable 81 daily  epoetin trey Injectable 65124 <User Schedule>  gabapentin 100 every 12 hours  metoprolol tartrate 12.5 two times a day  oxyCODONE    IR 10 every 6 hours PRN  oxyCODONE    IR 5 every 4 hours PRN      Vital Signs Last 24 Hrs  T(C): 36.9 (31 Jan 2020 17:00), Max: 37.2 (31 Jan 2020 01:50)  T(F): 98.4 (31 Jan 2020 17:00), Max: 99 (31 Jan 2020 01:50)  HR: 64 (31 Jan 2020 17:00) (63 - 80)  BP: 113/71 (31 Jan 2020 17:00) (93/53 - 138/80)  BP(mean): --  RR: 18 (31 Jan 2020 17:00) (18 - 20)  SpO2: 97% (31 Jan 2020 17:00) (92% - 99%)    PHYSICAL EXAMINATION:  General: Alert and Awake, NAD  HEENT: PERRL, EOMI  Neck: Supple  Cardiac: RRR, No M/R/G  Resp: CTAB, No Wh/Rh/Ra  Abdomen: +RLQ drain with serosanguinous drainage. +RLQ wound vac over transplant nephrectomy site, NBS, mild tenderness to palpation over RLQ  MSK: 1-2+ RLE edema. No Calf tenderness  Skin: No rashes or lesions. Skin is warm and dry to the touch.   Neuro: Alert and Awake. CN 2-12 Grossly intact. Moves all four extremities spontaneously.  Psych: Calm, Pleasant, Cooperative                          9.5    11.79 )-----------( 775      ( 31 Jan 2020 06:24 )             29.0       01-31    135  |  95<L>  |  14  ----------------------------<  100<H>  3.7   |  29  |  3.88<H>    Ca    9.0      31 Jan 2020 06:23  Phos  2.7     01-31  Mg     1.9     01-31    TPro  6.3  /  Alb  2.4<L>  /  TBili  0.6  /  DBili  0.3<H>  /  AST  24  /  ALT  <5<L>  /  AlkPhos  287<H>  01-31          MICROBIOLOGY:  v  .Blood Blood  01-24-20   No growth at 5 days.  --  --      .Body Fluid RLQ Fluid Collection  01-24-20   Testing in progress  --  --      .Blood Blood  01-24-20   No growth at 5 days.  --  --      .Body Fluid Abdominal Fluid  01-22-20   No growth at 5 days  --    polymorphonuclear leukocytes seen  Gram Negative Rods seen  by cytocentrifuge      .Blood Blood  01-21-20   No growth at 5 days.  --  --      .Blood Blood  01-21-20   No growth at 5 days.  --  --      .Sputum Sputum, trap  01-18-20   Normal Respiratory Gillian present  --    No polymorphonuclear leukocytes per low power field  Rare Squamous epithelial cells per low power field  Rare Gram positive cocci in pairs per oil power field      .Blood Blood  01-16-20   No growth at 5 days.  --  --      .Body Fluid Abdominal Fluid  01-15-20   No growth at 5 days  --    polymorphonuclear leukocytes seen  No organisms seen  by cytocentrifuge      .Tissue Other  01-10-20   No growth at 5 days  --  Enterobacter cloacae (Carbapenem Resistant)      .Blood Blood-Venous  01-07-20   No growth at 5 days.  --  --      .Urine Catheterized  01-07-20   <10,000 CFU/mL Normal Urogenital Gillian  --  --      Abdominal Fl Abdominal Fluid  01-06-20   No growth at 5 days  --    No polymorphonuclear leukocytes seen  No organisms seen  by cytocentrifuge      .Urine Clean Catch (Midstream)  01-05-20   >100,000 CFU/ml Enterobacter cloacae  --  Enterobacter cloacae      .Blood Blood  01-04-20   No growth at 5 days.  --  --          CMVPCR Log: NotDetec Jwv24MI/mL (01-26 @ 12:50)        RADIOLOGY:    <The imaging below has been reviewed and visualized me independently>    EXAM:  XR CHEST PORTABLE ROUTINE 1V                        PROCEDURE DATE:  01/25/2020    Clear lungs. No pleural effusion or pneumothorax.  The cardiomediastinal silhouette is normal in size and contour. Old fracture deformity of the left clavicle.

## 2020-01-31 NOTE — PROGRESS NOTE ADULT - SUBJECTIVE AND OBJECTIVE BOX
[facility] - Melissa Kelsey, Enrique, Maximino, Marin, Ravi Goodson  Office Number:  [phone]    Patient resting comfortably in bed in NAD.  Laying flat with no respiratory distress.  No complaints of chest pain, dyspnea, palpitations, PND, or orthopnea.    F/U for:  PE, cardiac arrest, prolonged qt      MEDICATIONS  (STANDING):  allopurinol 100 milliGRAM(s) Oral daily  aMIOdarone    Tablet 200 milliGRAM(s) Oral daily  apixaban 5 milliGRAM(s) Oral two times a day  aspirin  chewable 81 milliGRAM(s) Oral daily  brimonidine 0.2% Ophthalmic Solution 1 Drop(s) Both EYES three times a day  chlorhexidine 2% Cloths 1 Application(s) Topical <User Schedule>  epoetin trey Injectable 20480 Unit(s) IV Push <User Schedule>  gabapentin 100 milliGRAM(s) Oral every 12 hours  latanoprost 0.005% Ophthalmic Solution 1 Drop(s) Both EYES daily  meropenem  IVPB 500 milliGRAM(s) IV Intermittent every 12 hours  metoprolol tartrate 12.5 milliGRAM(s) Oral two times a day  micafungin IVPB 100 milliGRAM(s) IV Intermittent every 24 hours  Nephro-candace 1 Tablet(s) Oral daily    MEDICATIONS  (PRN):  acetaminophen   Tablet .. 975 milliGRAM(s) Oral every 6 hours PRN Mild Pain (1 - 3)  oxyCODONE    IR 10 milliGRAM(s) Oral every 6 hours PRN Severe Pain (7 - 10)  oxyCODONE    IR 5 milliGRAM(s) Oral every 4 hours PRN Moderate Pain (4 - 6)      Allergies    coconut (Anaphylaxis)  morphine (Pruritus; Rash)    Intolerances        Vital Signs Last 24 Hrs  T(C): 36.7 (31 Jan 2020 05:59), Max: 37.2 (31 Jan 2020 01:50)  T(F): 98.1 (31 Jan 2020 05:59), Max: 99 (31 Jan 2020 01:50)  HR: 69 (31 Jan 2020 05:59) (68 - 80)  BP: 138/80 (31 Jan 2020 05:59) (93/53 - 138/80)  BP(mean): --  RR: 18 (31 Jan 2020 05:59) (18 - 18)  SpO2: 98% (31 Jan 2020 05:59) (92% - 99%)    I&O's Summary    30 Jan 2020 07:01  -  31 Jan 2020 07:00  --------------------------------------------------------  IN: 1690 mL / OUT: 2990 mL / NET: -1300 mL        ON EXAM:    Constitutional: NAD, awake    HEENT: Moist Mucous Membranes, Anicteric  Pulmonary: Decreased breath sounds b/l. No rales, crackles or wheeze appreciated.   Cardiovascular: Regular, S1 and S2, No murmurs, rubs, gallops or clicks  Gastrointestinal: Bowel Sounds present, soft, nontender.   Lymph: dependent peripheral edema. No lymphadenopathy.  Skin: No visible rashes or ulcers.  Psych:  Mood & affect appropriate for situation      LABS: All Labs Reviewed:                        9.5    11.79 )-----------( 775      ( 31 Jan 2020 06:24 )             29.0                         6.9    13.88 )-----------( 872      ( 30 Jan 2020 14:05 )             21.7     31 Jan 2020 06:23    135    |  95     |  14     ----------------------------<  100    3.7     |  29     |  3.88   30 Jan 2020 14:05    134    |  94     |  22     ----------------------------<  106    3.7     |  27     |  5.23     Ca    9.0        31 Jan 2020 06:23  Ca    8.7        30 Jan 2020 14:05  Phos  2.7       31 Jan 2020 06:23  Phos  3.1       30 Jan 2020 14:05  Mg     1.9       31 Jan 2020 06:23  Mg     1.9       30 Jan 2020 14:05    TPro  6.3    /  Alb  2.4    /  TBili  0.6    /  DBili  0.3    /  AST  24     /  ALT  <5     /  AlkPhos  287    31 Jan 2020 06:23  TPro  5.9    /  Alb  2.4    /  TBili  0.5    /  DBili  0.3    /  AST  21     /  ALT  7      /  AlkPhos  288    30 Jan 2020 14:05    PT/INR - ( 31 Jan 2020 06:24 )   PT: 18.8 sec;   INR: 1.61 ratio         PTT - ( 31 Jan 2020 06:24 )  PTT:41.3 sec      Blood Culture:

## 2020-02-01 LAB
ALBUMIN SERPL ELPH-MCNC: 2.1 G/DL — LOW (ref 3.3–5)
ALP SERPL-CCNC: 308 U/L — HIGH (ref 40–120)
ALT FLD-CCNC: 9 U/L — LOW (ref 10–45)
ANION GAP SERPL CALC-SCNC: 13 MMOL/L — SIGNIFICANT CHANGE UP (ref 5–17)
ANION GAP SERPL CALC-SCNC: 15 MMOL/L — SIGNIFICANT CHANGE UP (ref 5–17)
APTT BLD: 36.5 SEC — HIGH (ref 27.5–36.3)
AST SERPL-CCNC: 31 U/L — SIGNIFICANT CHANGE UP (ref 10–40)
BASE EXCESS BLDV CALC-SCNC: 6.2 MMOL/L — HIGH (ref -2–2)
BILIRUB DIRECT SERPL-MCNC: 0.2 MG/DL — SIGNIFICANT CHANGE UP (ref 0–0.2)
BILIRUB INDIRECT FLD-MCNC: 0.4 MG/DL — SIGNIFICANT CHANGE UP (ref 0.2–1)
BILIRUB SERPL-MCNC: 0.6 MG/DL — SIGNIFICANT CHANGE UP (ref 0.2–1.2)
BUN SERPL-MCNC: 19 MG/DL — SIGNIFICANT CHANGE UP (ref 7–23)
BUN SERPL-MCNC: 26 MG/DL — HIGH (ref 7–23)
CA-I SERPL-SCNC: 1.1 MMOL/L — LOW (ref 1.12–1.3)
CALCIUM SERPL-MCNC: 8.4 MG/DL — SIGNIFICANT CHANGE UP (ref 8.4–10.5)
CALCIUM SERPL-MCNC: 8.9 MG/DL — SIGNIFICANT CHANGE UP (ref 8.4–10.5)
CHLORIDE BLDV-SCNC: 99 MMOL/L — SIGNIFICANT CHANGE UP (ref 96–108)
CHLORIDE SERPL-SCNC: 95 MMOL/L — LOW (ref 96–108)
CHLORIDE SERPL-SCNC: 98 MMOL/L — SIGNIFICANT CHANGE UP (ref 96–108)
CK SERPL-CCNC: 37 U/L — SIGNIFICANT CHANGE UP (ref 30–200)
CO2 BLDV-SCNC: 32 MMOL/L — HIGH (ref 22–30)
CO2 SERPL-SCNC: 24 MMOL/L — SIGNIFICANT CHANGE UP (ref 22–31)
CO2 SERPL-SCNC: 24 MMOL/L — SIGNIFICANT CHANGE UP (ref 22–31)
CREAT SERPL-MCNC: 4.91 MG/DL — HIGH (ref 0.5–1.3)
CREAT SERPL-MCNC: 5.43 MG/DL — HIGH (ref 0.5–1.3)
GAS PNL BLDV: 131 MMOL/L — LOW (ref 135–145)
GAS PNL BLDV: SIGNIFICANT CHANGE UP
GLUCOSE BLDV-MCNC: 100 MG/DL — HIGH (ref 70–99)
GLUCOSE SERPL-MCNC: 109 MG/DL — HIGH (ref 70–99)
GLUCOSE SERPL-MCNC: 85 MG/DL — SIGNIFICANT CHANGE UP (ref 70–99)
HCO3 BLDV-SCNC: 31 MMOL/L — HIGH (ref 21–29)
HCT VFR BLD CALC: 21.4 % — LOW (ref 39–50)
HCT VFR BLD CALC: 23 % — LOW (ref 39–50)
HCT VFR BLD CALC: 23.1 % — LOW (ref 39–50)
HCT VFR BLD CALC: 30.4 % — LOW (ref 39–50)
HCT VFR BLDA CALC: 23 % — LOW (ref 39–50)
HGB BLD CALC-MCNC: 7.5 G/DL — LOW (ref 13–17)
HGB BLD-MCNC: 6.6 G/DL — CRITICAL LOW (ref 13–17)
HGB BLD-MCNC: 7.3 G/DL — LOW (ref 13–17)
HGB BLD-MCNC: 7.5 G/DL — LOW (ref 13–17)
HGB BLD-MCNC: 9.6 G/DL — LOW (ref 13–17)
HOROWITZ INDEX BLDV+IHG-RTO: 21 — SIGNIFICANT CHANGE UP
INR BLD: 1.73 RATIO — HIGH (ref 0.88–1.16)
LACTATE BLDV-MCNC: 0.9 MMOL/L — SIGNIFICANT CHANGE UP (ref 0.7–2)
MAGNESIUM SERPL-MCNC: 2 MG/DL — SIGNIFICANT CHANGE UP (ref 1.6–2.6)
MCHC RBC-ENTMCNC: 27.6 PG — SIGNIFICANT CHANGE UP (ref 27–34)
MCHC RBC-ENTMCNC: 28.6 PG — SIGNIFICANT CHANGE UP (ref 27–34)
MCHC RBC-ENTMCNC: 28.6 PG — SIGNIFICANT CHANGE UP (ref 27–34)
MCHC RBC-ENTMCNC: 29.3 PG — SIGNIFICANT CHANGE UP (ref 27–34)
MCHC RBC-ENTMCNC: 30.8 GM/DL — LOW (ref 32–36)
MCHC RBC-ENTMCNC: 31.6 GM/DL — LOW (ref 32–36)
MCHC RBC-ENTMCNC: 31.7 GM/DL — LOW (ref 32–36)
MCHC RBC-ENTMCNC: 32.5 GM/DL — SIGNIFICANT CHANGE UP (ref 32–36)
MCV RBC AUTO: 89.5 FL — SIGNIFICANT CHANGE UP (ref 80–100)
MCV RBC AUTO: 90.2 FL — SIGNIFICANT CHANGE UP (ref 80–100)
MCV RBC AUTO: 90.2 FL — SIGNIFICANT CHANGE UP (ref 80–100)
MCV RBC AUTO: 90.5 FL — SIGNIFICANT CHANGE UP (ref 80–100)
NRBC # BLD: 0 /100 WBCS — SIGNIFICANT CHANGE UP (ref 0–0)
OTHER CELLS CSF MANUAL: 5 ML/DL — LOW (ref 18–22)
PCO2 BLDV: 46 MMHG — SIGNIFICANT CHANGE UP (ref 35–50)
PH BLDV: 7.43 — SIGNIFICANT CHANGE UP (ref 7.35–7.45)
PHOSPHATE SERPL-MCNC: 3.7 MG/DL — SIGNIFICANT CHANGE UP (ref 2.5–4.5)
PLATELET # BLD AUTO: 473 K/UL — HIGH (ref 150–400)
PLATELET # BLD AUTO: 659 K/UL — HIGH (ref 150–400)
PLATELET # BLD AUTO: 663 K/UL — HIGH (ref 150–400)
PLATELET # BLD AUTO: 775 K/UL — HIGH (ref 150–400)
PO2 BLDV: 28 MMHG — SIGNIFICANT CHANGE UP (ref 25–45)
POTASSIUM BLDV-SCNC: 4.7 MMOL/L — SIGNIFICANT CHANGE UP (ref 3.5–5.3)
POTASSIUM SERPL-MCNC: 4.3 MMOL/L — SIGNIFICANT CHANGE UP (ref 3.5–5.3)
POTASSIUM SERPL-MCNC: 4.5 MMOL/L — SIGNIFICANT CHANGE UP (ref 3.5–5.3)
POTASSIUM SERPL-SCNC: 4.3 MMOL/L — SIGNIFICANT CHANGE UP (ref 3.5–5.3)
POTASSIUM SERPL-SCNC: 4.5 MMOL/L — SIGNIFICANT CHANGE UP (ref 3.5–5.3)
PROCALCITONIN SERPL-MCNC: 0.66 NG/ML — HIGH (ref 0.02–0.1)
PROT SERPL-MCNC: 6.1 G/DL — SIGNIFICANT CHANGE UP (ref 6–8.3)
PROTHROM AB SERPL-ACNC: 20 SEC — HIGH (ref 10–12.9)
RBC # BLD: 2.39 M/UL — LOW (ref 4.2–5.8)
RBC # BLD: 2.55 M/UL — LOW (ref 4.2–5.8)
RBC # BLD: 2.56 M/UL — LOW (ref 4.2–5.8)
RBC # BLD: 3.36 M/UL — LOW (ref 4.2–5.8)
RBC # FLD: 18.1 % — HIGH (ref 10.3–14.5)
RBC # FLD: 18.7 % — HIGH (ref 10.3–14.5)
RBC # FLD: 18.8 % — HIGH (ref 10.3–14.5)
RBC # FLD: 19.6 % — HIGH (ref 10.3–14.5)
SAO2 % BLDV: 49 % — LOW (ref 67–88)
SODIUM SERPL-SCNC: 134 MMOL/L — LOW (ref 135–145)
SODIUM SERPL-SCNC: 135 MMOL/L — SIGNIFICANT CHANGE UP (ref 135–145)
WBC # BLD: 11.47 K/UL — HIGH (ref 3.8–10.5)
WBC # BLD: 15.29 K/UL — HIGH (ref 3.8–10.5)
WBC # BLD: 15.44 K/UL — HIGH (ref 3.8–10.5)
WBC # BLD: 15.49 K/UL — HIGH (ref 3.8–10.5)
WBC # FLD AUTO: 11.47 K/UL — HIGH (ref 3.8–10.5)
WBC # FLD AUTO: 15.29 K/UL — HIGH (ref 3.8–10.5)
WBC # FLD AUTO: 15.44 K/UL — HIGH (ref 3.8–10.5)
WBC # FLD AUTO: 15.49 K/UL — HIGH (ref 3.8–10.5)

## 2020-02-01 PROCEDURE — 93990 DOPPLER FLOW TESTING: CPT | Mod: 26

## 2020-02-01 PROCEDURE — 99232 SBSQ HOSP IP/OBS MODERATE 35: CPT

## 2020-02-01 PROCEDURE — 73206 CT ANGIO UPR EXTRM W/O&W/DYE: CPT | Mod: 26,LT

## 2020-02-01 PROCEDURE — 99232 SBSQ HOSP IP/OBS MODERATE 35: CPT | Mod: 24

## 2020-02-01 PROCEDURE — 71275 CT ANGIOGRAPHY CHEST: CPT | Mod: 26

## 2020-02-01 PROCEDURE — 99291 CRITICAL CARE FIRST HOUR: CPT | Mod: 25

## 2020-02-01 PROCEDURE — 36556 INSERT NON-TUNNEL CV CATH: CPT

## 2020-02-01 PROCEDURE — 99233 SBSQ HOSP IP/OBS HIGH 50: CPT

## 2020-02-01 RX ORDER — HYDROMORPHONE HYDROCHLORIDE 2 MG/ML
1 INJECTION INTRAMUSCULAR; INTRAVENOUS; SUBCUTANEOUS ONCE
Refills: 0 | Status: DISCONTINUED | OUTPATIENT
Start: 2020-02-01 | End: 2020-02-01

## 2020-02-01 RX ORDER — FAMOTIDINE 10 MG/ML
20 INJECTION INTRAVENOUS DAILY
Refills: 0 | Status: DISCONTINUED | OUTPATIENT
Start: 2020-02-01 | End: 2020-02-14

## 2020-02-01 RX ORDER — HYDROMORPHONE HYDROCHLORIDE 2 MG/ML
0.5 INJECTION INTRAMUSCULAR; INTRAVENOUS; SUBCUTANEOUS ONCE
Refills: 0 | Status: DISCONTINUED | OUTPATIENT
Start: 2020-02-01 | End: 2020-02-01

## 2020-02-01 RX ORDER — ACETAMINOPHEN 500 MG
1000 TABLET ORAL ONCE
Refills: 0 | Status: COMPLETED | OUTPATIENT
Start: 2020-02-01 | End: 2020-02-01

## 2020-02-01 RX ADMIN — Medication 12.5 MILLIGRAM(S): at 17:26

## 2020-02-01 RX ADMIN — AMIODARONE HYDROCHLORIDE 200 MILLIGRAM(S): 400 TABLET ORAL at 06:25

## 2020-02-01 RX ADMIN — Medication 400 MILLIGRAM(S): at 23:25

## 2020-02-01 RX ADMIN — OXYCODONE HYDROCHLORIDE 10 MILLIGRAM(S): 5 TABLET ORAL at 16:40

## 2020-02-01 RX ADMIN — OXYCODONE HYDROCHLORIDE 5 MILLIGRAM(S): 5 TABLET ORAL at 20:05

## 2020-02-01 RX ADMIN — OXYCODONE HYDROCHLORIDE 5 MILLIGRAM(S): 5 TABLET ORAL at 11:03

## 2020-02-01 RX ADMIN — MICAFUNGIN SODIUM 105 MILLIGRAM(S): 100 INJECTION, POWDER, LYOPHILIZED, FOR SOLUTION INTRAVENOUS at 17:59

## 2020-02-01 RX ADMIN — Medication 1000 MILLIGRAM(S): at 23:25

## 2020-02-01 RX ADMIN — OXYCODONE HYDROCHLORIDE 10 MILLIGRAM(S): 5 TABLET ORAL at 07:02

## 2020-02-01 RX ADMIN — HYDROMORPHONE HYDROCHLORIDE 1 MILLIGRAM(S): 2 INJECTION INTRAMUSCULAR; INTRAVENOUS; SUBCUTANEOUS at 23:25

## 2020-02-01 RX ADMIN — APIXABAN 5 MILLIGRAM(S): 2.5 TABLET, FILM COATED ORAL at 06:25

## 2020-02-01 RX ADMIN — Medication 100 MILLIGRAM(S): at 12:55

## 2020-02-01 RX ADMIN — Medication 1 TABLET(S): at 12:55

## 2020-02-01 RX ADMIN — BRIMONIDINE TARTRATE 1 DROP(S): 2 SOLUTION/ DROPS OPHTHALMIC at 17:59

## 2020-02-01 RX ADMIN — OXYCODONE HYDROCHLORIDE 5 MILLIGRAM(S): 5 TABLET ORAL at 12:00

## 2020-02-01 RX ADMIN — FAMOTIDINE 20 MILLIGRAM(S): 10 INJECTION INTRAVENOUS at 12:55

## 2020-02-01 RX ADMIN — HYDROMORPHONE HYDROCHLORIDE 0.5 MILLIGRAM(S): 2 INJECTION INTRAMUSCULAR; INTRAVENOUS; SUBCUTANEOUS at 16:15

## 2020-02-01 RX ADMIN — Medication 81 MILLIGRAM(S): at 12:55

## 2020-02-01 RX ADMIN — HYDROMORPHONE HYDROCHLORIDE 1 MILLIGRAM(S): 2 INJECTION INTRAMUSCULAR; INTRAVENOUS; SUBCUTANEOUS at 16:20

## 2020-02-01 RX ADMIN — HYDROMORPHONE HYDROCHLORIDE 1 MILLIGRAM(S): 2 INJECTION INTRAMUSCULAR; INTRAVENOUS; SUBCUTANEOUS at 16:35

## 2020-02-01 RX ADMIN — GABAPENTIN 100 MILLIGRAM(S): 400 CAPSULE ORAL at 06:25

## 2020-02-01 RX ADMIN — HYDROMORPHONE HYDROCHLORIDE 0.5 MILLIGRAM(S): 2 INJECTION INTRAMUSCULAR; INTRAVENOUS; SUBCUTANEOUS at 18:00

## 2020-02-01 RX ADMIN — OXYCODONE HYDROCHLORIDE 5 MILLIGRAM(S): 5 TABLET ORAL at 19:35

## 2020-02-01 RX ADMIN — OXYCODONE HYDROCHLORIDE 5 MILLIGRAM(S): 5 TABLET ORAL at 23:47

## 2020-02-01 RX ADMIN — OXYCODONE HYDROCHLORIDE 10 MILLIGRAM(S): 5 TABLET ORAL at 06:31

## 2020-02-01 RX ADMIN — HYDROMORPHONE HYDROCHLORIDE 0.5 MILLIGRAM(S): 2 INJECTION INTRAMUSCULAR; INTRAVENOUS; SUBCUTANEOUS at 16:00

## 2020-02-01 RX ADMIN — LATANOPROST 1 DROP(S): 0.05 SOLUTION/ DROPS OPHTHALMIC; TOPICAL at 12:55

## 2020-02-01 RX ADMIN — HYDROMORPHONE HYDROCHLORIDE 1 MILLIGRAM(S): 2 INJECTION INTRAMUSCULAR; INTRAVENOUS; SUBCUTANEOUS at 23:47

## 2020-02-01 RX ADMIN — BRIMONIDINE TARTRATE 1 DROP(S): 2 SOLUTION/ DROPS OPHTHALMIC at 23:26

## 2020-02-01 RX ADMIN — BRIMONIDINE TARTRATE 1 DROP(S): 2 SOLUTION/ DROPS OPHTHALMIC at 06:26

## 2020-02-01 RX ADMIN — GABAPENTIN 100 MILLIGRAM(S): 400 CAPSULE ORAL at 17:26

## 2020-02-01 RX ADMIN — OXYCODONE HYDROCHLORIDE 10 MILLIGRAM(S): 5 TABLET ORAL at 17:10

## 2020-02-01 RX ADMIN — MEROPENEM 100 MILLIGRAM(S): 1 INJECTION INTRAVENOUS at 16:40

## 2020-02-01 NOTE — PROGRESS NOTE ADULT - SUBJECTIVE AND OBJECTIVE BOX
SURGICAL INTENSIVE CARE UNIT DAILY PROGRESS NOTE    24 HOUR EVENTS:   - While undergoing hemodialysis through L AVF, upper arm noted to be acute swollen, with extension into L chest wall, with tenderness to palpation. Concern for infiltration of dialysis catheter.     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 Patient was admitted and hospital course is as follows:    12/27 - started on Tamiflu, Duplex of the transplanted kidney demonstrated an increase in the perinephric hematoma from 5.7 cm to 9.4 cm  12/29 - patient reporting dysuria so urine culture sent, which was positive for Enterobacter cloacae  01/01 - s/p right groin exploration, washout of the perinephric hematoma, and biopsy of the transplanted kidney, cultures of the hematoma positive for Enterobacter cloacae & E. coli, biopsy demonstrates  01/05 - patient noted to be febrile and hypotensive with new episodes of wide complex tachycardia on telemetry, labs significant for worsening leukocytosis, cultures sent, CT scan obtained & demonstrated a persistent perinephric collection, admitted to SICU for hemodynamic monitoring  01/06 - FNA w/ IR with aspiration of 18 mL of clear & pale yellow fluid but cultures negative for any growth, patient began having gross hematuria requiring CBI  01/07 - repeat imaging demonstrated no change in the size of the perinephric collection  01/08 - RLE edema was noted so a LE Duplex was obtained, which demonstrated a right external iliac & common femoral vein DVT, vascular surgery consulted, patient started on a heparin infusion  01/10 - patient became altered with worsening hematuria despite CBI, patient was taken to IR for an angiogram, which demonstrated a pseudoaneurysm of the transplant renal artery & external iliac artery anastomosis so the pseudoaneurysm was stented & an IVC filter was placed, patient taken to the OR and is s/p right groin exploration, washout of the RP space, transplant nephrectomy w/ repair of the right external iliac artery w/ a bovine patch & removal of the stent placed by IR, and thrombectomy of the RLE veins, returned to SICU still intubated  01/13 - acutely bradycardic and hypotensive progressing into PEA arrest with progression into torsades requiring ACLS for a total of ~12 mins, imaging revealed saddle PE with TTE demonstrating RV strain, PERT team consulted, management with heparin infusion as patient is not a tPA candidate, did not require vasopressors shortly after ROSC  01/15 - ultrasound with large right iliac fossa collection s/p percutaneous drainage by IR, 220 mL of dark red fluid drained and 10 Fr drain was left in placed  01/16 - extubated  01/19 - RUE Duplex with thrombosis of the brachial & cephalic veins, RLQ ultrasound with interval decrease in right iliac fossa collection  01/21 - CTA chest and CT abdomen & pelvis obtained demonstrating known RLL and LLL lobar artery PEs but resolution of PE in bilateral pulmonary arteries, new BLL wedge-like opacities likely representing pulmonary infarcts, and interval decrease in RLQ fluid collection  01/22 - RLQ IR drain upsized by IR from 10 Fr to 12 Fr  01/23 - Changed heparin infusion to Eliquis, stable for transfer to floor on 1/25.   1/23-2/1: No acute issues, rehab planning. However on 2/1 while undergoing  hemodialysis through L AVF, had massive infiltration, transferred to Surgical Intensive Care Unit for q1h neurovascular checks with concern for upper arm compartment syndrome.     Recipient info:   CPRA:    0%  ABO:      A+  CMVAb:  Positive  EBVIgG Status:  Positive  Last HD:  12/7/2019    Donor ID:  [id number]  Match: [id number]  OPO: NYRT  Age:  49  ABO:  A  KDPI 81%  COD:  Anoxia  X Clamp Time:  12/7/2019 1538.  Medical Hx: DM, HTN, HLD, obesity BMI 53, CAD, CABAGX3  Terminal Cr:  1/1.8/1.7  CMV- Neg EBV- Neg    OR:    DDRT to right iliac fossa. Simulect induction. Two arteries anastomosed to a single cuff on the back table. One vein, one ureter. Ureteral anastomosis performed over a double J stent, which was sutured to the martinez.   Cold ischemia time 25.5 hours. (27 Dec 2019 10:45)      NEURO: Awake, arousable. L arm distal fistula with palpable thrill.     RESPIRATORY  RR: 20 (02-01-20 @ 13:00) (18 - 20)  SpO2: 95% (02-01-20 @ 13:00) (95% - 99%)    CARDIOVASCULAR  HR: 100 (02-01-20 @ 13:00) (60 - 100)  BP: 130/78 (02-01-20 @ 13:00) (103/54 - 130/78)    GI/NUTRITION  Wound vac in place.     GENITOURINARY  I&O's Detail    01-31 @ 07:01  -  02-01 @ 07:00  --------------------------------------------------------  IN:    Oral Fluid: 840 mL  Total IN: 840 mL    OUT:    Bulb: 140 mL  Total OUT: 140 mL    Total NET: 700 mL      02-01 @ 07:01  -  02-01 @ 15:03  --------------------------------------------------------  IN:    Oral Fluid: 275 mL  Total IN: 275 mL    OUT:  Total OUT: 0 mL    Total NET: 275 mL          02-01    134<L>  |  95<L>  |  19  ----------------------------<  85  4.5   |  24  |  4.91<H>    Ca    8.9      01 Feb 2020 06:29  Phos  3.7     02-01  Mg     2.0     02-01    TPro  6.1  /  Alb  2.1<L>  /  TBili  0.6  /  DBili  0.2  /  AST  31  /  ALT  9<L>  /  AlkPhos  308<H>  02-01      HEMATOLOGIC                        9.6    11.47 )-----------( 775      ( 01 Feb 2020 06:10 )             30.4     PT/INR - ( 31 Jan 2020 06:24 )   PT: 18.8 sec;   INR: 1.61 ratio         PTT - ( 31 Jan 2020 06:24 )  PTT:41.3 sec SURGICAL INTENSIVE CARE UNIT DAILY PROGRESS NOTE    PATIENT READMITTED TO SURGICAL INTENSIVE CARE UNIT ON 2/1/20.    24 HOUR EVENTS:   - While undergoing hemodialysis through L AVF, upper arm noted to be acute swollen, with extension into L chest wall, with tenderness to palpation. Concern for infiltration of dialysis catheter.   - Otherwise remained hemodynamically stable, SBP during event noted to be in 150s, HR 80s.     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 Patient was admitted and hospital course is as follows:    12/27 - started on Tamiflu, Duplex of the transplanted kidney demonstrated an increase in the perinephric hematoma from 5.7 cm to 9.4 cm  12/29 - patient reporting dysuria so urine culture sent, which was positive for Enterobacter cloacae  01/01 - s/p right groin exploration, washout of the perinephric hematoma, and biopsy of the transplanted kidney, cultures of the hematoma positive for Enterobacter cloacae & E. coli, biopsy demonstrates  01/05 - patient noted to be febrile and hypotensive with new episodes of wide complex tachycardia on telemetry, labs significant for worsening leukocytosis, cultures sent, CT scan obtained & demonstrated a persistent perinephric collection, admitted to SICU for hemodynamic monitoring  01/06 - FNA w/ IR with aspiration of 18 mL of clear & pale yellow fluid but cultures negative for any growth, patient began having gross hematuria requiring CBI  01/07 - repeat imaging demonstrated no change in the size of the perinephric collection  01/08 - RLE edema was noted so a LE Duplex was obtained, which demonstrated a right external iliac & common femoral vein DVT, vascular surgery consulted, patient started on a heparin infusion  01/10 - patient became altered with worsening hematuria despite CBI, patient was taken to IR for an angiogram, which demonstrated a pseudoaneurysm of the transplant renal artery & external iliac artery anastomosis so the pseudoaneurysm was stented & an IVC filter was placed, patient taken to the OR and is s/p right groin exploration, washout of the RP space, transplant nephrectomy w/ repair of the right external iliac artery w/ a bovine patch & removal of the stent placed by IR, and thrombectomy of the RLE veins, returned to SICU still intubated  01/13 - acutely bradycardic and hypotensive progressing into PEA arrest with progression into torsades requiring ACLS for a total of ~12 mins, imaging revealed saddle PE with TTE demonstrating RV strain, PERT team consulted, management with heparin infusion as patient is not a tPA candidate, did not require vasopressors shortly after ROSC  01/15 - ultrasound with large right iliac fossa collection s/p percutaneous drainage by IR, 220 mL of dark red fluid drained and 10 Fr drain was left in placed  01/16 - extubated  01/19 - RUE Duplex with thrombosis of the brachial & cephalic veins, RLQ ultrasound with interval decrease in right iliac fossa collection  01/21 - CTA chest and CT abdomen & pelvis obtained demonstrating known RLL and LLL lobar artery PEs but resolution of PE in bilateral pulmonary arteries, new BLL wedge-like opacities likely representing pulmonary infarcts, and interval decrease in RLQ fluid collection  01/22 - RLQ IR drain upsized by IR from 10 Fr to 12 Fr  01/23 - Changed heparin infusion to Eliquis, stable for transfer to floor on 1/25.   1/23-2/1: No acute issues, rehab planning. However on 2/1 while undergoing  hemodialysis through L AVF, had massive infiltration, transferred to Surgical Intensive Care Unit for q1h neurovascular checks with concern for upper arm compartment syndrome.     Recipient info:   CPRA:    0%  ABO:      A+  CMVAb:  Positive  EBVIgG Status:  Positive  Last HD:  12/7/2019    Donor ID:  [id number]  Match: [id number]  OPO: NYRT  Age:  49  ABO:  A  KDPI 81%  COD:  Anoxia  X Clamp Time:  12/7/2019 1538.  Medical Hx: DM, HTN, HLD, obesity BMI 53, CAD, CABAGX3  Terminal Cr:  1/1.8/1.7  CMV- Neg EBV- Neg    OR:    DDRT to right iliac fossa. Simulect induction. Two arteries anastomosed to a single cuff on the back table. One vein, one ureter. Ureteral anastomosis performed over a double J stent, which was sutured to the martinez.   Cold ischemia time 25.5 hours. (27 Dec 2019 10:45)      NEURO: Awake, arousable. L arm distal fistula with palpable thrill.     RESPIRATORY  RR: 20 (02-01-20 @ 13:00) (18 - 20)  SpO2: 95% (02-01-20 @ 13:00) (95% - 99%)    CARDIOVASCULAR  HR: 100 (02-01-20 @ 13:00) (60 - 100)  BP: 130/78 (02-01-20 @ 13:00) (103/54 - 130/78)    GI/NUTRITION  Wound vac in place.     GENITOURINARY  I&O's Detail    01-31 @ 07:01  -  02-01 @ 07:00  --------------------------------------------------------  IN:    Oral Fluid: 840 mL  Total IN: 840 mL    OUT:    Bulb: 140 mL  Total OUT: 140 mL    Total NET: 700 mL      02-01 @ 07:01  -  02-01 @ 15:03  --------------------------------------------------------  IN:    Oral Fluid: 275 mL  Total IN: 275 mL    OUT:  Total OUT: 0 mL    Total NET: 275 mL          02-01    134<L>  |  95<L>  |  19  ----------------------------<  85  4.5   |  24  |  4.91<H>    Ca    8.9      01 Feb 2020 06:29  Phos  3.7     02-01  Mg     2.0     02-01    TPro  6.1  /  Alb  2.1<L>  /  TBili  0.6  /  DBili  0.2  /  AST  31  /  ALT  9<L>  /  AlkPhos  308<H>  02-01      HEMATOLOGIC                        9.6    11.47 )-----------( 775      ( 01 Feb 2020 06:10 )             30.4     PT/INR - ( 31 Jan 2020 06:24 )   PT: 18.8 sec;   INR: 1.61 ratio         PTT - ( 31 Jan 2020 06:24 )  PTT:41.3 sec

## 2020-02-01 NOTE — PROGRESS NOTE ADULT - SUBJECTIVE AND OBJECTIVE BOX
Transplant Surgery - Multidisciplinary Rounds  --------------------------------------------------------------  R DDRT    Date:  12/8/19     Present:   Patient seen with multidisciplinary team including ( Transplant Surgeon: Dr. Cruz. Transplant Nephrologist: Dr. Puckett. Syed Jackson and Felecia Arzola in am rounds and examined with Dr. Cruz.  Disciplines not in attendance will be notified of the plan.     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 Young, followed by Dr. Raphael, [facility] Oncologist), RA with hand contractures. He underwent DDRT on 12/8/19. Post op course c/b DGF requiring HD, thrombocytopenia, elevated LDH and haptoglobin. Schistocytes  on peripheral smear concerning for TMA. Envarsus held. Received Belatacept 12/13. Started on PLEX (12/12, 12/15). Underwent renal bx on 12/13 c/w profound ATN.  Discharged home on 12/20/19 w/ outpatient HD.     Re-admitted 12/27 with influenza, completed treatement with Tamiflu on 12/31. Urine cx on admission grew Ent cloacae, was started on meropenem 12/31. Renal US on admission with increasing hematoma.   ·	OR 1/1 for ex-lap, hematoma evacuation, washout and renal bx. (c/w ATN)  OR cxs grew CRE and e coli. Post op course c/b:   ·	fevers and leukocytosis, CT a/p (1/4) with increased perinephric collection. Underwent IR guided collection 1/6   ·	New onset hematuria, martinez inserted 1/7, started CBI  ·	RLE edema, RLE doppler (1/8) with acute above the knee thrombus of R external iliac, common femoral and femoral veins, Acute calf vein thrombus affecting R soleal vein, Heparin gtt initiated   ·	AMS 1/9  (head CT neg), hyponatremic  ·	Significant hematuria and bleeding around the matrinez. Angio 1/9 showed actively bleeding pseudoaneurysm at the anastomosis of the transplant renal artery with the recipient iliac artery. A stent was placed in the iliac artery to  control hemorrhage. IVC filter placed.   ·	Emergent OR 1/9-1/10 for graft nephrectomy;  ureter to bladder anastomosis was completely disrupted, repaired the R external iliac artery with bovine pericardial patch, repaired right external iliac vein, performed thrombectomy of RLE veins, Intra op received 3u PRBC.   ·	Extubated 1/11  ·	1/13 Saddle PE, with right heart strain and complicated by PEA arrest followed by [place], ROSC achieved after ACLS; re-intubated  ·	1/15 Renal US with 13.7 x 5.0 x 1.5 cm in RLQ transplant bed; s/p bedside IR guided drainage, pigtail in place. Fluid cx to date with NG  ·	1/21 CT C/A/P showing persistent collection, catheter at superior portion of collection  ·	1/22 IR pigtail drain repositioned and upsized to 12Fr catheter w/ increased output  ·	1/23 Transitioned to Eliquis from Heparin drip    Interval events:    -Afebrile, stable vitals.  -HD T/ TH/ S as per nephrology. Labs drawn every 48 hours w/ HD  - metoprolol to be held on HD days 2/2 hypotension   -LE w/ minimal improvement in edema.   -improvement with ambulation 1 person assist w walker.   - Plts stable at 775 Heme following   -Hemolysis work-up in progress, will f/u heme recs.  -On meropenem and micafungin.  -planning for acute rehab next week.       cxs:   12/29: Urine Cx: Enterobacter Cloacae  1/1 OR Perinephric collection Cx:  Carbapenem resistant Ent Cloacae, E. Coli  1/1: R kidney abscess swab: Enterobacter Cloacae  1/1  OR tissue: Enterobacter Cloacae  1/5: Urine Cx: Enteropbacter Cloacae  1/5: Skin incision Cx (bedside): NG  1/6: Adominal fluid Cx from IR:  NG   1/7: Urine Cx:  NG  1/7: BCX2:  NG  1/10 retroperit hematoma - enerobacter   1/10 bladder hematoma - enterobacter  1/15 Drainage fluid - NGTD  1/16: BC X2: NGTD  1/18: SPutum: Normal harvey  1/21: BC X2: NTGD  1/22: IR body fluid Cx: GNR pre-milian    Potential Discharge date: pending clinical improvement         MEDICATIONS  (STANDING):  allopurinol 100 milliGRAM(s) Oral daily  aMIOdarone    Tablet 200 milliGRAM(s) Oral daily  apixaban 5 milliGRAM(s) Oral two times a day  aspirin  chewable 81 milliGRAM(s) Oral daily  brimonidine 0.2% Ophthalmic Solution 1 Drop(s) Both EYES three times a day  chlorhexidine 2% Cloths 1 Application(s) Topical <User Schedule>  epoetin trey Injectable 99005 Unit(s) IV Push <User Schedule>  famotidine    Tablet 20 milliGRAM(s) Oral daily  gabapentin 100 milliGRAM(s) Oral every 12 hours  latanoprost 0.005% Ophthalmic Solution 1 Drop(s) Both EYES daily  meropenem  IVPB 500 milliGRAM(s) IV Intermittent every 12 hours  metoprolol tartrate 12.5 milliGRAM(s) Oral two times a day  micafungin IVPB 100 milliGRAM(s) IV Intermittent every 24 hours  Nephro-candace 1 Tablet(s) Oral daily    MEDICATIONS  (PRN):  acetaminophen   Tablet .. 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  Carpal tunnel syndrome  Brain cyst: had MRI recently- now gone  Anemia  Gastric ulcer: Long time ago  Rheumatoid arthritis  Renal transplant recipient  Breakdown (mechanical) of penile (implanted) prosthesis, sequela  End-stage renal disease (ESRD): PERMACATH PLACEMENT  Abscess of left buttock: s/p I &amp; D  AV fistula: placement left lower arm  S/P cystoscopy: stent placement &amp; removal for kidney stones, lithiotripsy  s/p Lt Carpal tunnel: 2011      Vital Signs Last 24 Hrs  T(C): 36.7 (01 Feb 2020 11:00), Max: 37.2 (31 Jan 2020 11:45)  T(F): 98.1 (01 Feb 2020 11:00), Max: 99 (31 Jan 2020 11:45)  HR: 68 (01 Feb 2020 11:00) (60 - 69)  BP: 120/81 (01 Feb 2020 11:00) (103/54 - 124/66)  BP(mean): --  RR: 18 (01 Feb 2020 11:00) (18 - 20)  SpO2: 95% (01 Feb 2020 11:00) (93% - 99%)    I&O's Summary    31 Jan 2020 07:01  -  01 Feb 2020 07:00  --------------------------------------------------------  IN: 840 mL / OUT: 140 mL / NET: 700 mL    01 Feb 2020 07:01  -  01 Feb 2020 11:32  --------------------------------------------------------  IN: 275 mL / OUT: 0 mL / NET: 275 mL                              9.6    11.47 )-----------( 775      ( 01 Feb 2020 06:10 )             30.4     02-01    134<L>  |  95<L>  |  19  ----------------------------<  85  4.5   |  24  |  4.91<H>    Ca    8.9      01 Feb 2020 06:29  Phos  3.7     02-01  Mg     2.0     02-01    TPro  6.1  /  Alb  2.1<L>  /  TBili  0.6  /  DBili  0.2  /  AST  31  /  ALT  9<L>  /  AlkPhos  308<H>  02-01        REVIEW OF SYSTEMS  Gen: + weakness   Skin: No rashes  Head/Eyes/Ears/Mouth: No headache; Normal hearing; Normal vision w/o blurriness; No sinus pain/discomfort, sore throat  Respiratory: no SOB  CV: No chest pain, PND, orthopnea  GI: incisional tenderness   : anuric  MSK: + lower extremity edema, RUE edema, flank edema  Neuro: No dizziness/lightheadedness, weakness, seizures, numbness, tingling  Heme: No easy bruising or bleeding  Endo: No heat/cold intolerance  Psych: No significant nervousness, anxiety, stress, depression      PHYSICAL EXAM:    Constitutional: NAD  	Eyes: Anicteric, PERRLA  	Respiratory: CTA b/l  	Cardiovascular: RRR  	Gastrointestinal: Soft abdomen, appropriate incisional TTP, ND.  wound vac ss/murky output (wound seen during recent wound vac change, good granulation tissue developing), RLQ drain with SS drainage    Genitourinary:  anuric, large amount scrotal edema     Extremities: b/l 2-3+ lower ext edema ace wrapped ( improved since yesterday); removed ACE wrap this AM and applied compression stockings; no ulcerations or skin breakdown, flank edema, RUE edema slow improvement   	Vascular: DP signal bilateral, L AVF palpable  	Neurological: A&O x3.  	Skin: no new lesions/ulcerations  	Musculoskeletal: Moving all extremities

## 2020-02-01 NOTE — PROVIDER CONTACT NOTE (OTHER) - RECOMMENDATIONS
Elastic wrap  by vascular team
SICU team assess patient.
Tylenol, Blood Cultures
MD to place new IV with ultrasound as soon as possible as the other IV is reserved for heparin drip only.
Team at bedside to assess patient. CBI? Draw CBC
VS

## 2020-02-01 NOTE — PROVIDER CONTACT NOTE (OTHER) - ASSESSMENT
BP right lower arm is 71/41, MAP 55 & BP right upper arm is 87/49, MAP 62, HR 58, RR 20, O2 98% room air, afebrile, patient resting, asymptomatic, no distress noted.
Left AVF venous side  infiltrated. Infiltration swelling continues  Despite applied pressure via upper and reaching left upper chest wall.
VS stable, febrile, pt warm to touch, patient denies chills or fever, no distress noted.
Patient has no c/o discomfort.
Patient urinated 600ml of juan red blood complaining of 10/10 pain.
Pt currently on heparin drip through right upper arm IV. Pt's right AC 5fr IV is leaking. IV flushed and dressing changed.  IV is leaking.
Pt is comfortable in bed

## 2020-02-01 NOTE — PROGRESS NOTE ADULT - SUBJECTIVE AND OBJECTIVE BOX
[facility] DIVISION OF KIDNEY DISEASES AND HYPERTENSION -- FOLLOW UP NOTE  --------------------------------------------------------------------------------  Chief Complaint:  transplant failure    24 hour events/subjective:  Pt still edematous.  Walking better.       PAST HISTORY  --------------------------------------------------------------------------------  No significant changes to PMH, PSH, FHx, SHx, unless otherwise noted    ALLERGIES & MEDICATIONS  --------------------------------------------------------------------------------  Allergies    coconut (Anaphylaxis)  morphine (Pruritus; Rash)    Intolerances      Standing Inpatient Medications  allopurinol 100 milliGRAM(s) Oral daily  aMIOdarone    Tablet 200 milliGRAM(s) Oral daily  apixaban 5 milliGRAM(s) Oral two times a day  aspirin  chewable 81 milliGRAM(s) Oral daily  brimonidine 0.2% Ophthalmic Solution 1 Drop(s) Both EYES three times a day  chlorhexidine 2% Cloths 1 Application(s) Topical <User Schedule>  epoetin trey Injectable 14022 Unit(s) IV Push <User Schedule>  famotidine    Tablet 20 milliGRAM(s) Oral daily  gabapentin 100 milliGRAM(s) Oral every 12 hours  latanoprost 0.005% Ophthalmic Solution 1 Drop(s) Both EYES daily  meropenem  IVPB 500 milliGRAM(s) IV Intermittent every 12 hours  metoprolol tartrate 12.5 milliGRAM(s) Oral two times a day  micafungin IVPB 100 milliGRAM(s) IV Intermittent every 24 hours  Nephro-candace 1 Tablet(s) Oral daily    PRN Inpatient Medications  acetaminophen   Tablet .. 975 milliGRAM(s) Oral every 6 hours PRN  oxyCODONE    IR 10 milliGRAM(s) Oral every 6 hours PRN  oxyCODONE    IR 5 milliGRAM(s) Oral every 4 hours PRN      REVIEW OF SYSTEMS  --------------------------------------------------------------------------------  Gen: + weakness   Skin: No rashes  Head/Eyes/Ears/Mouth: No headache;No sore throat  Respiratory: No dyspnea, cough,   CV: No chest pain, PND, orthopnea  GI: mild discomfort at wound vac site.   : anuric  MSK: + edema  Neuro: No dizziness/lightheadedness, weakness, seizures, numbness, tingling  Psych: No significant nervousness, anxiety, stress, depression    All other systems were reviewed and are negative, except as noted.    VITALS/PHYSICAL EXAM  --------------------------------------------------------------------------------  T(C): 36.8 (02-01-20 @ 09:00), Max: 37.2 (01-31-20 @ 11:45)  HR: 67 (02-01-20 @ 09:00) (60 - 69)  BP: 115/72 (02-01-20 @ 09:00) (103/54 - 124/66)  RR: 20 (02-01-20 @ 09:00) (18 - 20)  SpO2: 99% (02-01-20 @ 09:00) (93% - 99%)  Wt(kg): --        01-31-20 @ 07:01  -  02-01-20 @ 07:00  --------------------------------------------------------  IN: 840 mL / OUT: 140 mL / NET: 700 mL    02-01-20 @ 07:01  -  02-01-20 @ 10:56  --------------------------------------------------------  IN: 275 mL / OUT: 0 mL / NET: 275 mL      Physical Exam:  	Gen: NAD  	HEENT: PERRL, supple neck, clear oropharynx  	Pulm: CTA B/L  	CV: RRR, S1S2; no rub  	Back: No spinal or CVA tenderness; no sacral edema  	Abd: +BS, soft, nontender/nondistended, wound vac and drain in place.   	: No suprapubic tenderness  	UE: RUE edema  	LE: b/l LE edema  	Neuro: No focal deficits  	Psych: Normal affect and mood  	Skin: Warm, without rashes      LABS/STUDIES  --------------------------------------------------------------------------------              9.6    11.47 >-----------<  775      [02-01-20 @ 06:10]              30.4     134  |  95  |  19  ----------------------------<  85      [02-01-20 @ 06:29]  4.5   |  24  |  4.91        Ca     8.9     [02-01-20 @ 06:29]      Mg     2.0     [02-01-20 @ 06:29]      Phos  3.7     [02-01-20 @ 06:29]    TPro  6.1  /  Alb  2.1  /  TBili  0.6  /  DBili  0.2  /  AST  31  /  ALT  9   /  AlkPhos  308  [02-01-20 @ 06:29]    PT/INR: PT 18.8 , INR 1.61       [01-31-20 @ 06:24]  PTT: 41.3       [01-31-20 @ 06:24]      Creatinine Trend:  SCr 4.91 [02-01 @ 06:29]  SCr 3.88 [01-31 @ 06:23]  SCr 5.23 [01-30 @ 14:05]  SCr 6.65 [01-28 @ 06:20]  SCr 5.42 [01-27 @ 06:39]          BK Virus DNA by PCR:   [facility] Assay Range: 39 copies/mL to 1.00E+10 copies/mL  The limit of quantitation (LOQ) is 39 copies/mL. BK virus DNA  detected below the LOQ will be reported as Detected:<39 copies/mL.  This test was developed and its performance characteristics  determined by [facility]. It has not been cleared or approved  by the U.S. Food and Drug Administration. Results should be used in  conjunction with clinical findings, and should not form the sole  basis for a diagnosis or treatment decision.  ____________________________________________________________  Performed at:  [facility]  [address] [place] [address]  Laura Pettit PhD HCLD(ABB)  CLIA# [id number] (01-14 @ 11:42)          Iron 23, TIBC 189, %sat 12      [01-27-20 @ 14:03]  HbA1c 5.2      [01-13-20 @ 22:59]    HBsAb 351.0      [01-11-20 @ 16:09]  HBsAg Nonreact      [01-11-20 @ 16:09]  HBcAb Nonreact      [01-11-20 @ 16:09]  HCV 0.09, Nonreact      [01-11-20 @ 16:09]

## 2020-02-01 NOTE — PROGRESS NOTE ADULT - PROBLEM SELECTOR PLAN 4
On Meropenem and Micafungin for organ site infection. To be discharged (when stable) with ciprofloxacin. ID is following.

## 2020-02-01 NOTE — CHART NOTE - NSCHARTNOTEFT_GEN_A_CORE
Called to dialysis unit by RN; patient developed large area of infiltration of LUE into the chest wall. RRT was called and pressure was held. Vascular was paged and evaluated patient at bedside. Initially internal hematoma appeared to increase in size expanding to the chest wall; however appeared to be stable after pressure was applied. As per vascular, no need for OR at the present. Pressure ACE wrap dressing was applied to AVF. SICU team also by bedside, patient was transferred to the SICU for close monitoring.    -coags/CBC sent  -per vascular, resume Eliquis as patient had recent hx of PE. Area marked w/ surgical pen. Plan to hold Eliquis if hematoma appears to be expanding.   -AVF duplex pending. No urgent need for HD, will f/u duplex to assess AVF. If unable to use AVF, may need shiley tomorrow. Called to dialysis unit by RN; patient developed large area of infiltration of LUE into the chest wall soon after the fistula was cannulized. l. RRT was called and pressure was held. Vascular was paged and evaluated patient at bedside. Initially internal hematoma appeared to increase in size expanding to the chest wall; however appeared to be stable after pressure was applied. As per vascular, no need for OR at the present. Pressure ACE wrap dressing was applied to AVF site. SICU team also by bedside, patient was transferred to the SICU for close monitoring.    -coags/CBC sent  -per vascular, resume Eliquis as patient had recent hx of PE. Area marked w/ surgical pen. Plan to hold Eliquis if hematoma appears to be expanding.   -AVF duplex pending. Patient did not receive any HD; no urgent need for HD, will f/u duplex to assess AVF. If unable to use AVF, may need shiley tomorrow. Called to dialysis unit by RN; patient developed large area of infiltration of LUE into the chest wall soon after the fistula was cannulized. RRT was called and pressure was held. Vascular was paged and evaluated patient at bedside. Initially the area of infiltration appeared to increase in size expanding to the chest wall; however appeared to be stable after pressure was applied. As per vascular, no need for OR at the present. Pressure ACE wrap dressing was applied to AVF site. SICU team also by bedside, patient was transferred to the SICU for close monitoring.    -labs pending  -per vascular, resume Eliquis as patient had recent hx of PE. Area marked w/ surgical pen. Plan to hold Eliquis if hematoma appears to be expanding.   -AVF duplex pending. Patient did not receive any HD; no urgent need for HD, will f/u duplex to assess AVF. If unable to use AVF, may need shiley tomorrow. Called to dialysis unit by RN; patient developed large area of infiltration of LUE into the chest wall soon after the fistula was cannulized. RRT was called and pressure was held. Vascular was paged and evaluated patient at bedside. Initially the area of infiltration appeared to increase in size expanding to the chest wall; however appeared to be stable after pressure was applied. As per vascular, no need for OR at the present. Pressure ACE wrap dressing was applied to AVF site. SICU team also by bedside, patient was transferred to the SICU for close monitoring. Vitals remained stable during the event.    -labs pending  -per vascular, resume Eliquis as patient had recent hx of PE. Area marked w/ surgical pen. Plan to hold Eliquis if hematoma appears to be expanding.   -AVF duplex pending. Patient did not receive any HD; no urgent need for HD, will f/u duplex to assess AVF. If unable to use AVF, may need shiley tomorrow.

## 2020-02-01 NOTE — CHART NOTE - NSCHARTNOTEFT_GEN_A_CORE
Patient with infiltration of AV fistula along proximal LUE into chest wall upon access of dialysis. Brought to SICU for close neurovascular monitoring. Patient required one unit PRBC for associated Hct drop. Urgent Dupplex of AV fistula obtained but imaging inconclusive due to extensive soft tissue hematoma. CTA recommended to further evaluate and rule out pseuodoaneursym. Patient with no access for dialysis at this time to dialyze contrast load. Given urgency of clinical situation, will obtain CTA now and place temporary hemodialysis catheter to dialyze contrast load within 24 hours of administration as per protocol.    - Fermin Gonzalez PA-C

## 2020-02-01 NOTE — RAPID RESPONSE TEAM SUMMARY - NSADDTLFINDINGSRRT_GEN_ALL_CORE
Large area of infiltration of LUE into chest wall.  Marked with surgical pen.  Pressure held and bleeding seemed to stabilize.  Pressure stable.  Pt with significant pain on palpation of site.  LUE pulses present, however high risk for compartment syndrome.  Stat vascular consult placed who evaluated pt.  said no surgery for now but will take over pts care and transfer to SICU.  CBC and coags ordered.  Pt is on eliquis which was d/c'ed, discussed how pt may need to start hep gtt at 5pm but will defer to vascular.  Primary team also at bedside and aware.

## 2020-02-01 NOTE — CONSULT NOTE ADULT - SUBJECTIVE AND OBJECTIVE BOX
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 Vascular surgery called emergently because a RRT was called for fistula infiltration. The dialysis nurse noticed the left upper arm and chest wall began to swell soon after the fistula was cannulized. LUE AVF was placed by Dr. Shaw 5 years. Patient reports pain in the left upper arm. Sensation intact.      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  Carpal tunnel syndrome  Brain cyst: had MRI recently- now gone  Anemia  Gastric ulcer: Long time ago  Rheumatoid arthritis  Renal transplant recipient  Breakdown (mechanical) of penile (implanted) prosthesis, sequela  End-stage renal disease (ESRD): PERMACATH PLACEMENT  Abscess of left buttock: s/p I &amp; D  AV fistula: placement left lower arm  S/P cystoscopy: stent placement &amp; removal for kidney stones, lithiotripsy  s/p Lt Carpal tunnel: 2011      ALLERGIES:  coconut (Anaphylaxis)  morphine (Pruritus; Rash)      HOME MEDICATIONS:  allopurinol 100 mg oral tablet: 1 tab(s) orally once a day (07 Apr 2015 14:28)  brimonidine 0.2% ophthalmic solution: 1 drop(s) to each affected eye 3 times a day (20 Dec 2019 12:02)  dorzolamide 2% ophthalmic solution: 1 drop(s) to each affected eye 3 times a day (20 Dec 2019 12:02)  epoetin trey: to be administered with HD three times a week (20 Dec 2019 12:02)  famotidine 20 mg oral tablet: 1 tab(s) orally once a day (20 Dec 2019 12:02)  gabapentin 100 mg oral capsule: 1 cap(s) orally 2 times a day (20 Dec 2019 12:02)  hydrALAZINE 50 mg oral tablet: 1 tab(s) orally 2 times a day (20 Dec 2019 12:02)  latanoprost ophthalmic 0.005% ophthalmic solution: 1 drop(s) to each affected eye once a day (at bedtime) (07 Apr 2015 14:28)  metoprolol 25 mg oral tablet: 1 tab(s) orally 2 times a day (07 Apr 2015 14:28)  mycophenolate mofetil 500 mg oral tablet: 2 tab(s) orally 2 times a day (20 Dec 2019 12:02)  nystatin 100,000 units/mL oral suspension: 5 milliliter(s) orally 4 times a day (20 Dec 2019 12:02)  predniSONE: please follow your steroid taper sheet (20 Dec 2019 12:02)  sulfamethoxazole-trimethoprim 400 mg-80 mg oral tablet: 1 tab(s) orally once a day (20 Dec 2019 12:02)  valGANciclovir 450 mg oral tablet: 1 tab(s) orally  (20 Dec 2019 12:02)      MEDICATIONS  (STANDING):  allopurinol 100 milliGRAM(s) Oral daily  aMIOdarone    Tablet 200 milliGRAM(s) Oral daily  brimonidine 0.2% Ophthalmic Solution 1 Drop(s) Both EYES three times a day  chlorhexidine 2% Cloths 1 Application(s) Topical <User Schedule>  epoetin trey Injectable 40525 Unit(s) IV Push <User Schedule>  famotidine    Tablet 20 milliGRAM(s) Oral daily  gabapentin 100 milliGRAM(s) Oral every 12 hours  latanoprost 0.005% Ophthalmic Solution 1 Drop(s) Both EYES daily  meropenem  IVPB 500 milliGRAM(s) IV Intermittent every 12 hours  micafungin IVPB 100 milliGRAM(s) IV Intermittent every 24 hours  Nephro-candace 1 Tablet(s) Oral daily      SOCIAL HISTORY:  Denies smoking and ETOH use. Lives with family.      FAMILY HISTORY:  No pertinent history in first degree relatives.  ___________________________________________  REVIEW OF SYSTEMS:  Constitutional: No fevers, chills, no recent weight loss  ENMT: No changes in hearing, no changes in vision, no sore throat, no cough  Respiratory: No shortness of breath  Cardiovascular: No chest pain, palpitations  Gastrointestinal: No abdominal pain, no diarrhea/constipation  Genitourinary: No dysuria, frequency, or urgency    Extremities: No joint swelling, no limited range of movement  Neurological: No paresthesia  Skin: No rashes  ___________________________________________  PHYSICAL EXAM:  Vital Signs Last 24 Hrs  T(C): 36.6 (01 Feb 2020 23:00), Max: 37.7 (01 Feb 2020 15:00)  T(F): 97.9 (01 Feb 2020 23:00), Max: 99.9 (01 Feb 2020 15:00)  HR: 60 (02 Feb 2020 01:00) (60 - 100)  BP: 95/57 (02 Feb 2020 01:00) (81/45 - 157/81)  BP(mean): 70 (02 Feb 2020 01:00) (58 - 104)  RR: 15 (02 Feb 2020 01:00) (9 - 21)  SpO2: 98% (02 Feb 2020 01:00) (95% - 100%)CAPILLARY BLOOD GLUCOSE        I&O's Detail    31 Jan 2020 07:01  -  01 Feb 2020 07:00  --------------------------------------------------------  IN:    Oral Fluid: 840 mL  Total IN: 840 mL    OUT:    Bulb: 140 mL  Total OUT: 140 mL    Total NET: 700 mL      01 Feb 2020 07:01  -  02 Feb 2020 01:21  --------------------------------------------------------  IN:    Oral Fluid: 395 mL    Packed Red Blood Cells: 1050 mL    Solution: 100 mL    Solution: 100 mL  Total IN: 1645 mL    OUT:    Bulb: 70 mL  Total OUT: 70 mL    Total NET: 1575 mL        General: A&Ox3, NAD.  Neuro: Motor and sensory grossly intact with no focal deficits.  HEENT: Anicteric sclerae.  Respiratory: Unlabored breathing.   CVS: Regular rate and rhythm.  Abdomen: Soft, non-distended, non-tender.  Extremities: Warm bilaterally w/ palpable radial pulses. LUE - hematoma extending from mid upper arm to left chest wall. Fistula pulsatile.  MSK: Intact ROM.  ____________________________________________  LABS:  CBC Full  -  ( 01 Feb 2020 23:28 )  WBC Count : 18.46 K/uL  RBC Count : 1.90 M/uL  Hemoglobin : 5.7 g/dL  Hematocrit : 17.1 %  Platelet Count - Automated : 451 K/uL  Mean Cell Volume : 90.0 fl  Mean Cell Hemoglobin : 30.0 pg  Mean Cell Hemoglobin Concentration : 33.3 gm/dL    02-01    132<L>  |  97  |  27<H>  ----------------------------<  116<H>  4.2   |  24  |  5.57<H>    Ca    7.8<L>      01 Feb 2020 23:28  Phos  4.4     02-01  Mg     1.8     02-01    TPro  4.2<L>  /  Alb  1.8<L>  /  TBili  0.5  /  DBili  0.3<H>  /  AST  15  /  ALT  5<L>  /  AlkPhos  199<H>  02-01    LIVER FUNCTIONS - ( 01 Feb 2020 23:28 )  Alb: 1.8 g/dL / Pro: 4.2 g/dL / ALK PHOS: 199 U/L / ALT: 5 U/L / AST: 15 U/L / GGT: x           PT/INR - ( 01 Feb 2020 23:28 )   PT: 20.0 sec;   INR: 1.73 ratio         PTT - ( 01 Feb 2020 23:28 )  PTT:36.5 sec    CARDIAC MARKERS ( 01 Feb 2020 17:39 )  x     / x     / 37 U/L / x     / x

## 2020-02-01 NOTE — CONSULT NOTE ADULT - ATTENDING COMMENTS
Patient seen and examined on arrival to SICU on 1/5/20.  s/p renal transplant with delayed graft function  Now with fevers and hypotension on the floor  Brought to SICU for resuscitation and monitoring  For VIR evaluation on 1/6/20 for possible drainage of carlos-graft infected fluid  On broad-spectrum antibiotics     Care discussed with Ajay Poe and Gurjit
Pt. with very large AVF infiltration while on A/C for saddle PE  hematoma is not expanding  no motor or neuro deficit  very large L arm/chest wall hematoma  hg dropped from 7 to 5  responded appropriately top transfusion  hemodynamically stable  Plan  will wrap with ace  elevate  serial CBC  hold A/C  pt. already has a filter
Seen and examined with Fellow.  Agree with note.  Patient will need Acute Rehab when stable.
Seen this morning  Showing signs of clinical stability with normal BP and HR.  He is on MINIMAL oxgyen settings FI02 of 30 on the vent  discussed in detail with Shawn Espinal    Options are limited:  Catheter directed lysis will be risky given rescent surgery and bleeding risk  Catheter directed mechanical thrombectomy would be complex as well, as the IVC filter is there, and it has thrombus.  Also, it is good only for proximal clot and not distal PE.  So not sure how much benefit we can offer with this technology.  Seems risky.        Another option would be surgical thrombecomy (which would need a large bolus of heparin and further increase his bleeding risk)    Hopefully he continues to show stability on heparin gtt alone.  If he is to decompensate, then supportive care with pressors and ionotropes.      Shivani [phone]
As above.    Currently pt is hemodynamically stable, with normal lactate. Not hypoxic.  Given clinical circumstances  continue medical management with therapeutic heparinization.  Will follow.
I have personally provided 40 minutes of critical care time concurrently with the resident/fellow.  This time excludes time spent on separate procedures and time spent teaching.  I have reviewed the resident/fellow's documentation and I agree with the assessment and plan of care.     Patient seen and examined.  Agree with resident note as above.  Patient with hx as noted including recent renal txp with complicated post-transplant course, most recently resulting in explant of kidney, RLE clot with thrombectomy and IVCF placement, being maintained on hep gtt.  Today patient was abruptly bradycardic/hypoTN and then progressed to PEA arrest (with periods of torsades as well).  Intubated/resuscitated, and post-CAC POCUS shows R heart > L heart.  High suspicion for acute PE.  Urgent TTE done at bedside with evidence of septal flattening and RV dilation.      Initially pt with obstructive shock and acute hypoxic resp failure requiring 100% FiO2 (post-code gas with pO2=90).  Over the last hour pt has had considerable improvement with resolution of shock (off Levophed) and improvement in hypoxia (nhA5=751).  CTA shows saddle PE with extensive bilateral clot.    Given improvement in hypoxia and shock state, agree that a reasonable course of action would be to restart full dose heparin gtt for treatment of this acute PE.  If patient has worsening of his clinical status, would consider mechanical thrombectomy as a life-saving intervention.  Would identify an appropriate surgeon/service now to do this, so that they could be mobilized emergently if need be.    -continue hep gtt  -titrate vent to optimize oxygenation/ventilation as you are doing  -consideration for thrombectomy if patient decompensates
DDRT, DGF, on dialysis support  Oliguric  but starting to make urine  Elevated creatinine  H/o   fever, contact with Influenza  Reviewed immunosuppression and allograft function  Plan:  Belatacept infusion as scheduled, concur with immunosuppression  Monitor for dialysis need. Will dialyze today by primary nephrologist  Blood culture  I was present during and reviewed clinical and lab data as well as assessment and plan as documented by the house staff as noted. Please contact if any additional questions with any change in clinical condition or on availability of any additional information or reports.

## 2020-02-01 NOTE — PROVIDER CONTACT NOTE (OTHER) - SITUATION
Pt. Left AVF venous side infiltrated
/84 manually checked HR 80. Temp also 100.2
BP right lower arm is 71/41, MAP 55 & BP right upper arm is 87/49, MAP 62. Patient had HD during the day- removed 2.2 Liters & pt also has received PRN Dilaudid for pain
Patient oral temperature 38.9, rectal temp is 39.3.
Patient urinated 600ml of juan red blood complaining of 10/10 pain.
Pt had PATs x2, 3.6 & 3.4 seconds with HR in 140'a & 130's respectively
Pt's right AC 5fr IV is leaking.

## 2020-02-01 NOTE — CONSULT NOTE ADULT - PROVIDER SPECIALTY LIST ADULT
MICU
CTU
Heme/Onc
Infectious Disease
Nephrology
Rehab Medicine
Vascular Cardiology
Cardiology
SICU
Transplant Medicine
Urology
Vascular Surgery
Vascular Surgery

## 2020-02-01 NOTE — PROVIDER CONTACT NOTE (OTHER) - BACKGROUND
Sai Johan is a 48 y/o male presenting with influenza A with a hospital course complicated by:
ESRD on dialysis due to delayed graft function of DDRT on 12/8/19, Brain cyst, RA, Gastric ulcer
Patient admitted 12/27 for dec urine output and fevers. s/p hematoma washout 1/1 with right BO drain.
s/p IR for infected hematoma
s/p nephrectomy

## 2020-02-01 NOTE — PROGRESS NOTE ADULT - ASSESSMENT
49M PMHx of ESRD on HD  ( via LUE AVF), HTN, Gout, Glaucoma, NET of pancreas, RA with hand contractures. s/p DDRT 12/8/19,  Post op course c/b DGF requiring HD. Renal bx x 2 c/w profound ATN.     Re-admitted with Influenza and perinephric hematoma. s/p hematoma evacuation/ abd washout and renal biopsy. Post op course c/b leukocytosis, infected hematoma, acute RLE DVT, and bleeding pseudoaneurysm at txp renal artery anastomosis. Now s/p graft nephrectomy. Course further complicated by saddle PE causing right heart strain and hemodynamic instability leading to cardiac arrest requiring ACLS, Required IR drainage of post-op collections x2. Transferred to floor on 1/25/2020.     [] DDRT c/b DGF/ATN/perinephric infected hematoma/pseudoneurysm/graft nephrectomy   - HD per nephrology team now T/ TH/ S  -  will plan for holding BB on HD days 2/2 low bp   - s/p bedside IR drainage of RLQ fluid collection (1/15), drain repositioned 1/22 and cultures pending result this far negative  - FU final IR culture, fungal culture from 1/22- pending.   - Repeat chest/abd/pelvis CT 1/28- noted with continued resolution of right lower quadrant fluid collection. Persistent bilateral lower lobe pulmonary emboli.  - ID following;  cont Meropenem, Micafungin 100mg daily.   ID plan for discharge on cipro.  - pain control  - bowel regimen  - PT/OT/OOB.  VAC change per PT  - Keep B/L LE w/ compression stockings. Has gout pain on R knee, avoid covering the R knee. Apply compression stocking on R side up to just below knee level and apply ACE bandage above the knee on the R side  up to mid/upper thigh.   - S/P 2 u PRBC for drop in H&H with improvement today H&H 9.5/29  -Start pepcid.     [] Saddle PE, with right heart strain and complicated by PEA arrest followed by Torsade, ROSC achieved after ACLS  - vascular, CT and vascular cardiology following   -Repeat chest CT 1/28 noted Persistent bilateral lower lobe pulmonary emboli.   - CT 1/21: There are pulmonary emboli within the right lower lobar arteries and the left lower lobar arteries extending into the distal segments as seen on prior study. The pulmonary emboli seen in the right and left pulmonary arteries are normal longer visualized. New distal to mid brachial DVT noted  1/19 Duplex: RUE Deep vein thrombosis in the mid to distal brachial vein.  - Continue Eliquis to 5mg PO BID   - continue ASA  - repeat ECHO with improved R heart strain    [] New Afib/Vtech  Cardiology following, BB low dose in the setting of WCT and hold BB on HD days  Continue amiodarone, card plan to continue for couple of months and will f/u as outpt    [] New acute DVT of R distal brachia vein   - continue anticoagulation     [] New acute DVT of R external iliac, common femoral, femoral veins, R soleal vein requiring heparin drip  - IVC filter placed 1/9, continue anticoagulation     [] Thrombocytosis  - rising INR/PLT counts over the past week.   -Heme consulted; likely reactive. Heme work up in progress    [] Dispo  - continue tele  -discharge planning in progress d/c to acute rehab once medically stable.

## 2020-02-01 NOTE — CHART NOTE - NSCHARTNOTEFT_GEN_A_CORE
Briefly, this is a 50yo M s/p DDRT c/b renal artery pseudoaneurysm requiring graft nephrectomy, postop course c/b cardiac arrest and saddle PE.   Vascular surgery called emergently because a RRT was called for fistula infiltration. The dialysis nurse noticed the left upper arm and chest wall began infiltrating soon after the fistula was cannulized. Patient remained hemodynamically stable.   LUE AVF placed by Dr. Shaw 5 years. Briefly, this is a 50yo M s/p DDRT c/b renal artery pseudoaneurysm requiring graft nephrectomy, postop course c/b cardiac arrest and saddle PE (on Eliquis).   Vascular surgery called emergently because a RRT was called for fistula infiltration. The dialysis nurse noticed the left upper arm and chest wall began infiltrating soon after the fistula was cannulized. Patient remained hemodynamically stable.   Fistula is pulsatile.   LUE AVF placed by Dr. Shaw 5 years.    Plan:  Will watch arm and chest hematoma on AC.   Fistula Duplex.     Pager [phone]

## 2020-02-01 NOTE — CONSULT NOTE ADULT - ASSESSMENT
50yo M s/p DDRT c/b renal artery pseudoaneurysm requiring graft nephrectomy, postop course c/b cardiac arrest and saddle PE (on Eliquis), now with infiltrated AVF during HD session.   Patient remained hemodynamically stable. Left upper arm hematoma extending to chest wall.   Fistula is pulsatile. Left arm and hand motor/sensation intact.    - Patient admitted to SICU to observe arm and chest hematoma on AC  - Fistula Duplex    Seen with Vascular Fellow  Pager [phone]

## 2020-02-01 NOTE — RAPID RESPONSE TEAM SUMMARY - NSSITUATIONBACKGROUNDRRT_GEN_ALL_CORE
49M PMHx of ESRD on HD  ( via LUE AVF), HTN, Gout, Glaucoma, NET of pancreas, RA with hand contractures. s/p DDRT 12/8/19,  Post op course c/b DGF requiring HD. Renal bx x 2 c/w profound ATN.     Re-admitted with Influenza and perinephric hematoma. s/p hematoma evacuation/ abd washout and renal biopsy. Post op course c/b leukocytosis, infected hematoma, acute RLE DVT, and bleeding pseudoaneurysm at txp renal artery anastomosis. Now s/p graft nephrectomy. Course further complicated by saddle PE causing right heart strain and hemodynamic instability leading to cardiac arrest requiring ACLS, Required IR drainage of post-op collections x2. Transferred to floor on 1/25/2020.   RRT called for infiltration of LUE fistula.

## 2020-02-01 NOTE — PROGRESS NOTE ADULT - ATTENDING COMMENTS
Pt known to me from recent stay In SICU  Developed swelling and pain in lt arm while being mireya lysed starting from fistula area extending to lt chest and distal LE  Radial pulse present  Plan Pain control  Serial H/H  Vascular surgery called  Pt on Apixaban for large PE, will restart if HCT remains stable

## 2020-02-01 NOTE — CONSULT NOTE ADULT - CONSULT REQUESTED DATE/TIME
13-Jan-2020 20:08
01-Feb-2020 15:30
05-Jan-2020 14:56
06-Jan-2020 09:30
06-Jan-2020 22:41
08-Jan-2020 18:15
13-Jan-2020 19:42
14-Jan-2020 23:55
24-Jan-2020 11:31
26-Jan-2020 11:31
27-Dec-2019 09:32
27-Dec-2019 10:30
27-Dec-2019 12:09

## 2020-02-01 NOTE — PROGRESS NOTE ADULT - SUBJECTIVE AND OBJECTIVE BOX
[facility] NEPHR[facility]  NEPHROLOGY AND HYPERTENSION  [address]  [address]  [place] [address]  [phone]    MD GIANA GUERRERO, MD JANAE MCDONNELL MD YELENA ROSENBERG, MD SALUD GARZA, MD CATRINA WOO MD          Patient feels with scrotal swelling       MEDICATIONS  (STANDING):  allopurinol 100 milliGRAM(s) Oral daily  aMIOdarone    Tablet 200 milliGRAM(s) Oral daily  apixaban 5 milliGRAM(s) Oral two times a day  aspirin  chewable 81 milliGRAM(s) Oral daily  brimonidine 0.2% Ophthalmic Solution 1 Drop(s) Both EYES three times a day  chlorhexidine 2% Cloths 1 Application(s) Topical <User Schedule>  epoetin trey Injectable 24439 Unit(s) IV Push <User Schedule>  famotidine    Tablet 20 milliGRAM(s) Oral daily  gabapentin 100 milliGRAM(s) Oral every 12 hours  latanoprost 0.005% Ophthalmic Solution 1 Drop(s) Both EYES daily  meropenem  IVPB 500 milliGRAM(s) IV Intermittent every 12 hours  metoprolol tartrate 12.5 milliGRAM(s) Oral two times a day  micafungin IVPB 100 milliGRAM(s) IV Intermittent every 24 hours  Nephro-candace 1 Tablet(s) Oral daily    MEDICATIONS  (PRN):  acetaminophen   Tablet .. 975 milliGRAM(s) Oral every 6 hours PRN Mild Pain (1 - 3)  oxyCODONE    IR 10 milliGRAM(s) Oral every 6 hours PRN Severe Pain (7 - 10)  oxyCODONE    IR 5 milliGRAM(s) Oral every 4 hours PRN Moderate Pain (4 - 6)      01-31-20 @ 07:01  -  02-01-20 @ 07:00  --------------------------------------------------------  IN: 840 mL / OUT: 140 mL / NET: 700 mL    02-01-20 @ 07:01  -  02-01-20 @ 12:31  --------------------------------------------------------  IN: 275 mL / OUT: 0 mL / NET: 275 mL      PHYSICAL EXAM:      T(C): 36.7 (02-01-20 @ 11:00), Max: 37.1 (02-01-20 @ 01:00)  HR: 68 (02-01-20 @ 11:00) (60 - 69)  BP: 120/81 (02-01-20 @ 11:00) (103/54 - 120/81)  RR: 18 (02-01-20 @ 11:00) (18 - 20)  SpO2: 95% (02-01-20 @ 11:00) (95% - 99%)  Wt(kg): --  Respiratory: clear anteriorly, decreased BS at bases  Cardiovascular: S1 S2  Gastrointestinal: soft NT ND +BS  Extremities:   1-2 edema                                    9.6    11.47 )-----------( 775      ( 01 Feb 2020 06:10 )             30.4     02-01    134<L>  |  95<L>  |  19  ----------------------------<  85  4.5   |  24  |  4.91<H>    Ca    8.9      01 Feb 2020 06:29  Phos  3.7     02-01  Mg     2.0     02-01    TPro  6.1  /  Alb  2.1<L>  /  TBili  0.6  /  DBili  0.2  /  AST  31  /  ALT  9<L>  /  AlkPhos  308<H>  02-01      LIVER FUNCTIONS - ( 01 Feb 2020 06:29 )  Alb: 2.1 g/dL / Pro: 6.1 g/dL / ALK PHOS: 308 U/L / ALT: 9 U/L / AST: 31 U/L / GGT: x           Creatinine Trend: 4.91<--, 3.88<--, 5.23<--, 6.65<--, 5.42<--, 4.16<--      A/P:    Hx ESRD on HD, s/p DDRT 12/8/19, DGF  S/p transplant kidney bx 12/13: ATN, focal TMA  S/p PLEX 12/13, 12/15; d/c-d 12/20 w/op HD 3 x wk  Re-adm 12/27 w/Flu A, infected carlos-nephric hematoma   S/p OR 1/1 for washout and repeat renal graft biopsy (c/w ATN)  Dx w/RLE DVT  Developed gross hematuria  S/p OR 1/10 for infected carlos-nephric hematoma, hemorrhage involving pseudo-aneurysm of anastomosis of renal artery to external iliac vein,   s/p transplant nephrectomy, s/p IVCF   S/p PEA arrest 1/13, dx w/PE, R retroperitoneal collection  S/p IR drainage of abd collection  RUE DVT  Abx per ID  HD TTS  TMP as able today;   Epogen w/HD 3 x week  PT  Discussed with patient the benefit of 4 times weekly HD in attempt to improve volume status.       Fam Esteban MD

## 2020-02-01 NOTE — PROVIDER CONTACT NOTE (OTHER) - ACTION/TREATMENT ORDERED:
MAEVE Gonzalez aware of blood pressure, STAT VBG ordered/sent, no other intervention at this time, will continue to monitor.
MD Dupont aware, PRN 975mg Tylenol PO given, MD stated no blood cultures at this time, temperature in room adjusted to be cooler/ heavy blankets removed,pt refusing cool packs,will continue to monitor
MD notified and aware; no further recommendations given at this time.
Pt transfer  8 ICU for higher care.
Dr. Floyd at bedside assessing patient. Transplant and Urology team contacted. Possible CBI? Q4 CBCS ordered. Will continue to monitor. Pain medication administered as per MAR.
MD to place new access.  Waiting for MD to come to bedside at this time.
Transplant Resident Rachna made aware and no further action/orders at this time.

## 2020-02-01 NOTE — PROGRESS NOTE ADULT - SUBJECTIVE AND OBJECTIVE BOX
[facility] NEPHROLOGY SERVICES, [facility]  NEPHROLOGY AND HYPERTENSION  [address]  [address]  [place] [address]  [phone]    MD GIANA GUERRERO, MD JANAE MCDONNELL MD YELENA ROSENBERG, MD SALUD GARZA, MD CATRINA WOO MD        Events noted; discussed with HD nurse;       MEDICATIONS  (STANDING):  allopurinol 100 milliGRAM(s) Oral daily  aMIOdarone    Tablet 200 milliGRAM(s) Oral daily  aspirin  chewable 81 milliGRAM(s) Oral daily  brimonidine 0.2% Ophthalmic Solution 1 Drop(s) Both EYES three times a day  chlorhexidine 2% Cloths 1 Application(s) Topical <User Schedule>  epoetin trey Injectable 99018 Unit(s) IV Push <User Schedule>  famotidine    Tablet 20 milliGRAM(s) Oral daily  gabapentin 100 milliGRAM(s) Oral every 12 hours  latanoprost 0.005% Ophthalmic Solution 1 Drop(s) Both EYES daily  meropenem  IVPB 500 milliGRAM(s) IV Intermittent every 12 hours  metoprolol tartrate 12.5 milliGRAM(s) Oral two times a day  micafungin IVPB 100 milliGRAM(s) IV Intermittent every 24 hours  Nephro-candace 1 Tablet(s) Oral daily    MEDICATIONS  (PRN):  acetaminophen   Tablet .. 975 milliGRAM(s) Oral every 6 hours PRN Mild Pain (1 - 3)  oxyCODONE    IR 10 milliGRAM(s) Oral every 6 hours PRN Severe Pain (7 - 10)  oxyCODONE    IR 5 milliGRAM(s) Oral every 4 hours PRN Moderate Pain (4 - 6)      01-31-20 @ 07:01 - 02-01-20 @ 07:00  --------------------------------------------------------  IN: 840 mL / OUT: 140 mL / NET: 700 mL    02-01-20 @ 07:01 - 02-01-20 @ 18:41  --------------------------------------------------------  IN: 595 mL / OUT: 70 mL / NET: 525 mL      PHYSICAL EXAM:      T(C): 37.7 (02-01-20 @ 15:00), Max: 37.7 (02-01-20 @ 15:00)  HR: 69 (02-01-20 @ 18:00) (60 - 100)  BP: 119/66 (02-01-20 @ 18:00) (103/54 - 140/67)  RR: 19 (02-01-20 @ 18:00) (11 - 20)  SpO2: 100% (02-01-20 @ 18:00) (95% - 100%)                      6.6    15.49 )-----------( 659      ( 01 Feb 2020 17:39 )             21.4     02-01    134<L>  |  95<L>  |  19  ----------------------------<  85  4.5   |  24  |  4.91<H>    Ca    8.9      01 Feb 2020 06:29  Phos  3.7     02-01  Mg     2.0     02-01    TPro  6.1  /  Alb  2.1<L>  /  TBili  0.6  /  DBili  0.2  /  AST  31  /  ALT  9<L>  /  AlkPhos  308<H>  02-01      LIVER FUNCTIONS - ( 01 Feb 2020 06:29 )  Alb: 2.1 g/dL / Pro: 6.1 g/dL / ALK PHOS: 308 U/L / ALT: 9 U/L / AST: 31 U/L / GGT: x           Creatinine Trend: 4.91<--, 3.88<--, 5.23<--, 6.65<--, 5.42<--, 4.16<--      Assessment   AVF infiltration; extending to chest; on Eliquis;     Plan:  Vascular evaluation  Holding HD for now;   Follow K trend;   Will likely require abdoulaye for HD treatment if hyperkalemic trend and need for blood products.    Fam Esteban MD

## 2020-02-01 NOTE — CHART NOTE - NSCHARTNOTEFT_GEN_A_CORE
Late entry. Patient seen and examined at bedside at 15:25 alongside resident staff. S/P infiltration of AVF at HD session with large hematoma spanning from left upper arm to left chest wall. Complaining of pain along hematoma. Denies any pain or sensory deficits in hand. Denies any weakness in hand. Able to move arm without assistance. On exam, large hematoma of upper arm and left chest wall tender to palpation. Upper arm compartments soft. No sensory or motor deficits of left hand. Palpable thrill over radiocephalic fistula in forearm. Palpable radial and ulnar pulses. Palpable pulse over fistula in upper arm. No skin breakdown or bleeding from cannulation sites. At the time, arm was wrapped with ace bandage from wrist to shoulder and elevated above the level of the heart.    Since then, there was a drop in H/H, s/p 1U pRBC. Now s/p CTA which is concerning for small area of active extravasation from unknown source vessel, however, most likely related to bleeding from fistula since patient is on therapeutic anticoagulation. Location of contrast extravasation in left axilla does not correlate well with location of cannulation sites in distal upper arm.     Assessment: L AVF infiltration with likely small amount of ongoing active bleeding with large LUE hematoma on therapeutic anticoagulation. The risks of surgery outweigh the benefits at this time since an operation would drastically increase blood loss and the chance of finding bleeding source is low.    The best management of this patient at this time would be to continue to elevate and compress the arm well, discontinue anticoagulation, and transfuse PRN. As the therapeutic anticoagulation wears off the bleeding should be self-limited.     Will continue to monitor.

## 2020-02-01 NOTE — PROVIDER CONTACT NOTE (OTHER) - NAME OF MD/NP/PA/DO NOTIFIED:
[facility]     My Wilman-Tracker Daily Monitoring        Daily Weight Goal: 145-152 lbs     Today's Weight: 147 lbs     Alert: Yes to SOB     Diuretic:  Torsemide 20 mg BID & spirolactone 50 mg daily     KCl: None    Pt accidentally pressed wrong button for SOB. She is not having any issues. I ravi ahead and re submitted her MHT for today. Did let pt know that Suad would like to check her cholesterol tomorrow and asked her to not eat after midnight and have only water and her medications prior to her appt. Asked her to bring a snack for after her lab draw. Pt expressed understanding.     Next appt 12/13 w/Suad Garcia RN 11:47 AM 12/11/19    
[place]
MAEVE Gonzalez
MD Facundo Dupont
MD Howard
MD Lopez
RRT Team Dr. Vj Alexander, Nephr.Dr De La Cruz
Transplant Resident Rachna made aware

## 2020-02-01 NOTE — PROGRESS NOTE ADULT - ASSESSMENT
49 year old male with ESRD on HD, now s/p ddrt on 12/8/2019 course complicated by TMA/profound ATN, who presents from a HD center with a fever and cough. He was initially admitted with influenza. He is s/p washout of infected collection and biopsy which revealed ATN.  He was found to have diffusely enlarging perinephric fluid collection.    While on telemetry, he had an episode of a wide complex tachycardia. It initially appears irregular, suggesting an atrial arrhythmia with aberrancy, though the remainder appears more like an episode of non-sustained VT.   s/p washout on 1/1/2020 with worsening sepsis and transfer to ICU, s/p IR drainage of perinephric collection on 1/6. 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 He underwent operative repair of right external iliac artery with pericardial patch, transplant nephrectomy, RLE thrombectomy, and IVC filter on 1/9.     s/p corrina ->tachy arrest with tdp, af rvr, now on amio, with large PE    - developed malignant arrhythmias which seemed to start with bradycardia, leading to more tachycardia and bursts of VT and rapid AF.  The apparent af degenerated into torsades.  He was shocked and has been maintaining SR since  - cont amio at 200 daily.   - cont metoprolol as tolerated by BP. Can give on non HD days and see how bp reacts on HD.   - repeat ekg today to evaluate qtc, while on antifungals/amiodarone  - arrhythmias were most likely triggered by the massive vte event, are unlikely to represent a primary cardiac issue  - hs trop noted, though in the context of his arrhythmias, and right heart strain from the pe, the trop is more likely to reflect demand than an acs event  - cont asa  - no events on telemetry for >72 hours. Can d/c.     - now on apixiban  - echo with preserved/hyperdynamic lv, with RV failure consistent with acute RV overload  - CTPA on 1/21 demonstrated some resolution of PE and evolving pulmonary infarcts.   - Further cardiac workup will depend on clinical course.  - will follow with you

## 2020-02-01 NOTE — CONSULT NOTE ADULT - CONSULT REASON
PERT
Fever
Flu
Hemodynamic monitoring
PE, cardiac arrest
RLE DVT
RRT called for infiltrated AV fistula
Saddle PE
Thrombocytosis
esrd
hematuria
hx DDRT
wct

## 2020-02-01 NOTE — PROGRESS NOTE ADULT - ASSESSMENT
Sai Prado is a 50 y/o male presenting with influenza A with a hospital course complicated by:  - Infected transplant perinephric hematoma s/p right groin exploration, washout, & biopsy c/b persistent perinephric collection s/p FNA  - Right external iliac & common femoral DVTs s/p IVC filter & thrombectomy of the RLE veins  - Gross hematuria w/ pseudoaneurysm of the right external iliac & transplant renal artery seen on IR angiogram s/p right groin exploration, washout of the RP space, and transplant nephrectomy w/ repair of the right external iliac artery w/ a bovine patch  - Iliac fossa collection s/p IR drainage  - Cardiac arrest secondary to a saddle PE    PLAN:  Neuro: No active issues.     Resp: saddle PE with pulmonary infarct sequela  - No active issues.   - Holding full A/C for now.    CV: obstructive shock secondary to PE (resolved), s/p PEA arrest with progression to torsades  - Monitor vital signs  - Continue amiodarone Per Os for prior Torsades.   - Continue metoprolol     GI: no acute issues  - Regular diet as tolerated  - Bowel regimen with senna & Miralax  - upsized IR drain for R iliac fossa, 60 cc output yesterday.    Renal: CKD stage V s/p DDRT c/b DGF & perinephric collection s/p right groin exploration, washout, & biopsy c/b persistent perinephric collection s/p FNA; gross hematuria w/ pseudoaneurysm of the right external iliac & transplant renal artery s/p right groin exploration, washout of the RP space, & transplant nephrectomy w/ repair of the right external iliac artery w/ a bovine patch; persistent right iliac fossa collection s/p IR drainage  - Dialysis likely today, last dialysis 1/23/2020    Heme: anemia of chronic diseases, saddle PE, right external iliac & common femoral DVTs s/p IVC filter  - Monitor CBC and coags  - Heparin infusion for anticoagulation for DVTs and PE  - Epogen resumed    ID: Enterobacter cloacae UTI, transplant perinephric collection and right iliac fossa collection with carbapenem resistant Enterobacter cloacae (CRE) and E. coli.  - Sputum on 1/18 with normal respiratory harvey  - Blood cultures from 1/16 no growth to date, 1/21 no growth to date.  - Right iliac fossa collection culture from 1/15 with no growth.  - RP hematoma culture from 1/10 with carbapenem resistant Enterobacter cloacae  - On Meropenem and Micafungin  - Trending Procalcitonin, and monitoring for fevers +/- leukocytosis.    Endo: No acute issues  - Monitor glucose on BMP    Misc: Gout, glaucoma  - Home allopurinol for gout  - Home eyedrops (latanoprost, dorzolamide, & brimonidine) for glaucoma  - PT/OT  - Physical Medicine and Rehabilitation consulted -- appreciate recommendations.    Disposition:  - Full code  - Will remain in SICU Sai Prado is a 50 y/o male presenting with influenza A with a hospital course complicated by:  - Infected transplant perinephric hematoma s/p right groin exploration, washout, & biopsy c/b persistent perinephric collection s/p FNA  - Right external iliac & common femoral DVTs s/p IVC filter & thrombectomy of the RLE veins  - Gross hematuria w/ pseudoaneurysm of the right external iliac & transplant renal artery seen on IR angiogram s/p right groin exploration, washout of the RP space, and transplant nephrectomy w/ repair of the right external iliac artery w/ a bovine patch  - Iliac fossa collection s/p IR drainage  - Cardiac arrest secondary to a saddle PE    PLAN:  Neuro: No active issues.   - Pain control with Per Os tylenol, oxycodone, gabapentin, dilaudid Pro Re Nicol.     Resp: saddle PE with pulmonary infarct sequela  - No active issues.   - Holding full A/C for now.     CV: obstructive shock secondary to PE (resolved), s/p PEA arrest with progression to torsades  - Monitor vital signs  - Continue amiodarone Per Os for prior Torsades.   - Continue metoprolol 12.5 BID.   - On ASA 81.     GI: no acute issues  - Renal diet as tolerated.  - Continue pepcid.   - Bowel regimen with senna & Miralax.  - Continue wound vac.     Renal: CKD stage V s/p DDRT c/b DGF & perinephric collection s/p right groin exploration, washout, & biopsy c/b persistent perinephric collection s/p FNA; gross hematuria w/ pseudoaneurysm of the right external iliac & transplant renal artery s/p right groin exploration, washout of the RP space, & transplant nephrectomy w/ repair of the right external iliac artery w/ a bovine patch; persistent right iliac fossa collection s/p IR drainage  - Unable to receive dialysis today, will hold off on alternate dialysis access, Transplant team with no urgent needs for dialysis at this time.   - On nephro-candace.     Heme: anemia of chronic diseases, saddle PE, right external iliac & common femoral DVTs s/p IVC filter  - Holding Eliquis for now pending repeat CBC.   - VA Duplex pending.   - Continue Epogen.     ID: Enterobacter cloacae UTI, transplant perinephric collection and right iliac fossa collection with carbapenem resistant Enterobacter cloacae (CRE) and E. coli.  - On 6 week course of Meropenem, micafungin per Transplant, with eventual plans to switch to Per Os Ciprofloxacin on discharge.     Endo: No acute issues  - Monitor glucose on BMP    Misc: Gout, glaucoma  - Home allopurinol for gout  - Home eyedrops (latanoprost, dorzolamide, & brimonidine) for glaucoma    Disposition:  - Surgical Intensive Care Unit

## 2020-02-01 NOTE — CONSULT NOTE ADULT - CONSULT REQUESTED BY NAME
Deepika
Deepika
Dr Cruz
Dr Cruz
Dr. Cruz
Dr. Deshpande
Rebecca Cruz
SICU
SICU
Transplant team
SICU

## 2020-02-01 NOTE — PROGRESS NOTE ADULT - SUBJECTIVE AND OBJECTIVE BOX
[facility] - Melissa Kelsey, Enrique, Maximino, Marin, Ravi Goodson  Office Number:  [phone]    Patient resting comfortably in bed in NAD.  Laying flat with no respiratory distress.  No complaints of chest pain, dyspnea, palpitations, PND, or orthopnea.    ROS: negative unless otherwise mentioned.    Telemetry:  sr    MEDICATIONS  (STANDING):  allopurinol 100 milliGRAM(s) Oral daily  aMIOdarone    Tablet 200 milliGRAM(s) Oral daily  apixaban 5 milliGRAM(s) Oral two times a day  aspirin  chewable 81 milliGRAM(s) Oral daily  brimonidine 0.2% Ophthalmic Solution 1 Drop(s) Both EYES three times a day  chlorhexidine 2% Cloths 1 Application(s) Topical <User Schedule>  epoetin trey Injectable 56293 Unit(s) IV Push <User Schedule>  gabapentin 100 milliGRAM(s) Oral every 12 hours  latanoprost 0.005% Ophthalmic Solution 1 Drop(s) Both EYES daily  meropenem  IVPB 500 milliGRAM(s) IV Intermittent every 12 hours  metoprolol tartrate 12.5 milliGRAM(s) Oral two times a day  micafungin IVPB 100 milliGRAM(s) IV Intermittent every 24 hours  Nephro-candace 1 Tablet(s) Oral daily    MEDICATIONS  (PRN):  acetaminophen   Tablet .. 975 milliGRAM(s) Oral every 6 hours PRN Mild Pain (1 - 3)  oxyCODONE    IR 10 milliGRAM(s) Oral every 6 hours PRN Severe Pain (7 - 10)  oxyCODONE    IR 5 milliGRAM(s) Oral every 4 hours PRN Moderate Pain (4 - 6)      Allergies    coconut (Anaphylaxis)  morphine (Pruritus; Rash)    Intolerances        Vital Signs Last 24 Hrs  T(C): 37.1 (01 Feb 2020 05:00), Max: 37.2 (31 Jan 2020 11:45)  T(F): 98.8 (01 Feb 2020 05:00), Max: 99 (31 Jan 2020 11:45)  HR: 60 (01 Feb 2020 05:00) (60 - 69)  BP: 104/63 (01 Feb 2020 05:00) (103/54 - 124/66)  BP(mean): --  RR: 18 (01 Feb 2020 05:00) (18 - 20)  SpO2: 99% (01 Feb 2020 05:00) (93% - 99%)    I&O's Summary    31 Jan 2020 07:01  -  01 Feb 2020 07:00  --------------------------------------------------------  IN: 840 mL / OUT: 140 mL / NET: 700 mL        ON EXAM:    Constitutional: NAD, awake    HEENT: Moist Mucous Membranes, Anicteric  Pulmonary: Decreased breath sounds b/l. No rales, crackles or wheeze appreciated.   Cardiovascular: Regular, S1 and S2, No murmurs, rubs, gallops or clicks  Gastrointestinal: Bowel Sounds present, soft, nontender.   Lymph: dependent peripheral edema, legs wrapped jolynn. No lymphadenopathy.  Skin: No visible rashes or ulcers.  Psych:  Mood & affect appropriate for situation    LABS: All Labs Reviewed:                        9.6    11.47 )-----------( 775      ( 01 Feb 2020 06:10 )             30.4                         9.5    11.79 )-----------( 775      ( 31 Jan 2020 06:24 )             29.0                         6.9    13.88 )-----------( 872      ( 30 Jan 2020 14:05 )             21.7     01 Feb 2020 06:29    134    |  95     |  19     ----------------------------<  85     4.5     |  24     |  4.91   31 Jan 2020 06:23    135    |  95     |  14     ----------------------------<  100    3.7     |  29     |  3.88   30 Jan 2020 14:05    134    |  94     |  22     ----------------------------<  106    3.7     |  27     |  5.23     Ca    8.9        01 Feb 2020 06:29  Ca    9.0        31 Jan 2020 06:23  Ca    8.7        30 Jan 2020 14:05  Phos  3.7       01 Feb 2020 06:29  Phos  2.7       31 Jan 2020 06:23  Phos  3.1       30 Jan 2020 14:05  Mg     2.0       01 Feb 2020 06:29  Mg     1.9       31 Jan 2020 06:23  Mg     1.9       30 Jan 2020 14:05    TPro  6.1    /  Alb  2.1    /  TBili  0.6    /  DBili  0.2    /  AST  31     /  ALT  9      /  AlkPhos  308    01 Feb 2020 06:29  TPro  6.3    /  Alb  2.4    /  TBili  0.6    /  DBili  0.3    /  AST  24     /  ALT  <5     /  AlkPhos  287    31 Jan 2020 06:23  TPro  5.9    /  Alb  2.4    /  TBili  0.5    /  DBili  0.3    /  AST  21     /  ALT  7      /  AlkPhos  288    30 Jan 2020 14:05    PT/INR - ( 31 Jan 2020 06:24 )   PT: 18.8 sec;   INR: 1.61 ratio         PTT - ( 31 Jan 2020 06:24 )  PTT:41.3 sec      Blood Culture:

## 2020-02-02 LAB
ALBUMIN SERPL ELPH-MCNC: 1.6 G/DL — LOW (ref 3.3–5)
ALBUMIN SERPL ELPH-MCNC: 1.8 G/DL — LOW (ref 3.3–5)
ALP SERPL-CCNC: 184 U/L — HIGH (ref 40–120)
ALP SERPL-CCNC: 199 U/L — HIGH (ref 40–120)
ALT FLD-CCNC: 5 U/L — LOW (ref 10–45)
ALT FLD-CCNC: 6 U/L — LOW (ref 10–45)
ANION GAP SERPL CALC-SCNC: 11 MMOL/L — SIGNIFICANT CHANGE UP (ref 5–17)
ANION GAP SERPL CALC-SCNC: 12 MMOL/L — SIGNIFICANT CHANGE UP (ref 5–17)
APTT BLD: 37.4 SEC — HIGH (ref 27.5–36.3)
AST SERPL-CCNC: 15 U/L — SIGNIFICANT CHANGE UP (ref 10–40)
AST SERPL-CCNC: 16 U/L — SIGNIFICANT CHANGE UP (ref 10–40)
BASOPHILS # BLD AUTO: 0.13 K/UL — SIGNIFICANT CHANGE UP (ref 0–0.2)
BASOPHILS NFR BLD AUTO: 0.9 % — SIGNIFICANT CHANGE UP (ref 0–2)
BILIRUB DIRECT SERPL-MCNC: 0.3 MG/DL — HIGH (ref 0–0.2)
BILIRUB DIRECT SERPL-MCNC: 0.4 MG/DL — HIGH (ref 0–0.2)
BILIRUB INDIRECT FLD-MCNC: 0.2 MG/DL — SIGNIFICANT CHANGE UP (ref 0.2–1)
BILIRUB INDIRECT FLD-MCNC: 0.3 MG/DL — SIGNIFICANT CHANGE UP (ref 0.2–1)
BILIRUB SERPL-MCNC: 0.5 MG/DL — SIGNIFICANT CHANGE UP (ref 0.2–1.2)
BILIRUB SERPL-MCNC: 0.7 MG/DL — SIGNIFICANT CHANGE UP (ref 0.2–1.2)
BLD GP AB SCN SERPL QL: NEGATIVE — SIGNIFICANT CHANGE UP
BUN SERPL-MCNC: 27 MG/DL — HIGH (ref 7–23)
BUN SERPL-MCNC: 28 MG/DL — HIGH (ref 7–23)
CALCIUM SERPL-MCNC: 7.7 MG/DL — LOW (ref 8.4–10.5)
CALCIUM SERPL-MCNC: 7.8 MG/DL — LOW (ref 8.4–10.5)
CHLORIDE SERPL-SCNC: 97 MMOL/L — SIGNIFICANT CHANGE UP (ref 96–108)
CHLORIDE SERPL-SCNC: 99 MMOL/L — SIGNIFICANT CHANGE UP (ref 96–108)
CK SERPL-CCNC: 86 U/L — SIGNIFICANT CHANGE UP (ref 30–200)
CO2 SERPL-SCNC: 24 MMOL/L — SIGNIFICANT CHANGE UP (ref 22–31)
CO2 SERPL-SCNC: 24 MMOL/L — SIGNIFICANT CHANGE UP (ref 22–31)
CREAT SERPL-MCNC: 5.57 MG/DL — HIGH (ref 0.5–1.3)
CREAT SERPL-MCNC: 5.67 MG/DL — HIGH (ref 0.5–1.3)
EOSINOPHIL # BLD AUTO: 0.14 K/UL — SIGNIFICANT CHANGE UP (ref 0–0.5)
EOSINOPHIL NFR BLD AUTO: 1 % — SIGNIFICANT CHANGE UP (ref 0–6)
GAS PNL BLDV: SIGNIFICANT CHANGE UP
GLUCOSE SERPL-MCNC: 110 MG/DL — HIGH (ref 70–99)
GLUCOSE SERPL-MCNC: 116 MG/DL — HIGH (ref 70–99)
HCT VFR BLD CALC: 17.1 % — CRITICAL LOW (ref 39–50)
HCT VFR BLD CALC: 20.7 % — CRITICAL LOW (ref 39–50)
HCT VFR BLD CALC: 21 % — CRITICAL LOW (ref 39–50)
HCT VFR BLD CALC: 22.8 % — LOW (ref 39–50)
HCT VFR BLD CALC: 22.8 % — LOW (ref 39–50)
HCT VFR BLD CALC: 23.8 % — LOW (ref 39–50)
HCT VFR BLD CALC: 24.1 % — LOW (ref 39–50)
HCT VFR BLD CALC: 25.2 % — LOW (ref 39–50)
HGB BLD-MCNC: 5.7 G/DL — CRITICAL LOW (ref 13–17)
HGB BLD-MCNC: 7 G/DL — CRITICAL LOW (ref 13–17)
HGB BLD-MCNC: 7.1 G/DL — LOW (ref 13–17)
HGB BLD-MCNC: 7.7 G/DL — LOW (ref 13–17)
HGB BLD-MCNC: 7.8 G/DL — LOW (ref 13–17)
HGB BLD-MCNC: 8.1 G/DL — LOW (ref 13–17)
HGB BLD-MCNC: 8.2 G/DL — LOW (ref 13–17)
HGB BLD-MCNC: 8.2 G/DL — LOW (ref 13–17)
IMM GRANULOCYTES NFR BLD AUTO: 2.1 % — HIGH (ref 0–1.5)
INR BLD: 1.62 RATIO — HIGH (ref 0.88–1.16)
LYMPHOCYTES # BLD AUTO: 1.33 K/UL — SIGNIFICANT CHANGE UP (ref 1–3.3)
LYMPHOCYTES # BLD AUTO: 9.5 % — LOW (ref 13–44)
MAGNESIUM SERPL-MCNC: 1.8 MG/DL — SIGNIFICANT CHANGE UP (ref 1.6–2.6)
MAGNESIUM SERPL-MCNC: 1.9 MG/DL — SIGNIFICANT CHANGE UP (ref 1.6–2.6)
MCHC RBC-ENTMCNC: 29.1 PG — SIGNIFICANT CHANGE UP (ref 27–34)
MCHC RBC-ENTMCNC: 29.3 PG — SIGNIFICANT CHANGE UP (ref 27–34)
MCHC RBC-ENTMCNC: 29.4 PG — SIGNIFICANT CHANGE UP (ref 27–34)
MCHC RBC-ENTMCNC: 29.6 PG — SIGNIFICANT CHANGE UP (ref 27–34)
MCHC RBC-ENTMCNC: 29.7 PG — SIGNIFICANT CHANGE UP (ref 27–34)
MCHC RBC-ENTMCNC: 29.8 PG — SIGNIFICANT CHANGE UP (ref 27–34)
MCHC RBC-ENTMCNC: 30 PG — SIGNIFICANT CHANGE UP (ref 27–34)
MCHC RBC-ENTMCNC: 30 PG — SIGNIFICANT CHANGE UP (ref 27–34)
MCHC RBC-ENTMCNC: 32.5 GM/DL — SIGNIFICANT CHANGE UP (ref 32–36)
MCHC RBC-ENTMCNC: 33.3 GM/DL — SIGNIFICANT CHANGE UP (ref 32–36)
MCHC RBC-ENTMCNC: 33.6 GM/DL — SIGNIFICANT CHANGE UP (ref 32–36)
MCHC RBC-ENTMCNC: 33.8 GM/DL — SIGNIFICANT CHANGE UP (ref 32–36)
MCHC RBC-ENTMCNC: 34.2 GM/DL — SIGNIFICANT CHANGE UP (ref 32–36)
MCHC RBC-ENTMCNC: 34.5 GM/DL — SIGNIFICANT CHANGE UP (ref 32–36)
MCV RBC AUTO: 86.7 FL — SIGNIFICANT CHANGE UP (ref 80–100)
MCV RBC AUTO: 87 FL — SIGNIFICANT CHANGE UP (ref 80–100)
MCV RBC AUTO: 87 FL — SIGNIFICANT CHANGE UP (ref 80–100)
MCV RBC AUTO: 87.2 FL — SIGNIFICANT CHANGE UP (ref 80–100)
MCV RBC AUTO: 87.9 FL — SIGNIFICANT CHANGE UP (ref 80–100)
MCV RBC AUTO: 88 FL — SIGNIFICANT CHANGE UP (ref 80–100)
MCV RBC AUTO: 89.4 FL — SIGNIFICANT CHANGE UP (ref 80–100)
MCV RBC AUTO: 90 FL — SIGNIFICANT CHANGE UP (ref 80–100)
MONOCYTES # BLD AUTO: 1.74 K/UL — HIGH (ref 0–0.9)
MONOCYTES NFR BLD AUTO: 12.5 % — SIGNIFICANT CHANGE UP (ref 2–14)
NEUTROPHILS # BLD AUTO: 10.32 K/UL — HIGH (ref 1.8–7.4)
NEUTROPHILS NFR BLD AUTO: 74 % — SIGNIFICANT CHANGE UP (ref 43–77)
NRBC # BLD: 0 /100 WBCS — SIGNIFICANT CHANGE UP (ref 0–0)
PHOSPHATE SERPL-MCNC: 4.4 MG/DL — SIGNIFICANT CHANGE UP (ref 2.5–4.5)
PHOSPHATE SERPL-MCNC: 4.9 MG/DL — HIGH (ref 2.5–4.5)
PLATELET # BLD AUTO: 358 K/UL — SIGNIFICANT CHANGE UP (ref 150–400)
PLATELET # BLD AUTO: 362 K/UL — SIGNIFICANT CHANGE UP (ref 150–400)
PLATELET # BLD AUTO: 369 K/UL — SIGNIFICANT CHANGE UP (ref 150–400)
PLATELET # BLD AUTO: 370 K/UL — SIGNIFICANT CHANGE UP (ref 150–400)
PLATELET # BLD AUTO: 372 K/UL — SIGNIFICANT CHANGE UP (ref 150–400)
PLATELET # BLD AUTO: 374 K/UL — SIGNIFICANT CHANGE UP (ref 150–400)
PLATELET # BLD AUTO: 422 K/UL — HIGH (ref 150–400)
PLATELET # BLD AUTO: 451 K/UL — HIGH (ref 150–400)
POTASSIUM SERPL-MCNC: 4.2 MMOL/L — SIGNIFICANT CHANGE UP (ref 3.5–5.3)
POTASSIUM SERPL-MCNC: 4.6 MMOL/L — SIGNIFICANT CHANGE UP (ref 3.5–5.3)
POTASSIUM SERPL-SCNC: 4.2 MMOL/L — SIGNIFICANT CHANGE UP (ref 3.5–5.3)
POTASSIUM SERPL-SCNC: 4.6 MMOL/L — SIGNIFICANT CHANGE UP (ref 3.5–5.3)
PROCALCITONIN SERPL-MCNC: 0.76 NG/ML — HIGH (ref 0.02–0.1)
PROT SERPL-MCNC: 4.2 G/DL — LOW (ref 6–8.3)
PROT SERPL-MCNC: 4.2 G/DL — LOW (ref 6–8.3)
PROTHROM AB SERPL-ACNC: 18.8 SEC — HIGH (ref 10–12.9)
RBC # BLD: 1.9 M/UL — LOW (ref 4.2–5.8)
RBC # BLD: 2.38 M/UL — LOW (ref 4.2–5.8)
RBC # BLD: 2.39 M/UL — LOW (ref 4.2–5.8)
RBC # BLD: 2.62 M/UL — LOW (ref 4.2–5.8)
RBC # BLD: 2.63 M/UL — LOW (ref 4.2–5.8)
RBC # BLD: 2.73 M/UL — LOW (ref 4.2–5.8)
RBC # BLD: 2.74 M/UL — LOW (ref 4.2–5.8)
RBC # BLD: 2.82 M/UL — LOW (ref 4.2–5.8)
RBC # FLD: 15.2 % — HIGH (ref 10.3–14.5)
RBC # FLD: 15.8 % — HIGH (ref 10.3–14.5)
RBC # FLD: 15.9 % — HIGH (ref 10.3–14.5)
RBC # FLD: 16 % — HIGH (ref 10.3–14.5)
RBC # FLD: 16.2 % — HIGH (ref 10.3–14.5)
RBC # FLD: 16.3 % — HIGH (ref 10.3–14.5)
RBC # FLD: 16.5 % — HIGH (ref 10.3–14.5)
RBC # FLD: 17.4 % — HIGH (ref 10.3–14.5)
RH IG SCN BLD-IMP: POSITIVE — SIGNIFICANT CHANGE UP
SODIUM SERPL-SCNC: 132 MMOL/L — LOW (ref 135–145)
SODIUM SERPL-SCNC: 135 MMOL/L — SIGNIFICANT CHANGE UP (ref 135–145)
WBC # BLD: 13.95 K/UL — HIGH (ref 3.8–10.5)
WBC # BLD: 13.96 K/UL — HIGH (ref 3.8–10.5)
WBC # BLD: 14.57 K/UL — HIGH (ref 3.8–10.5)
WBC # BLD: 14.68 K/UL — HIGH (ref 3.8–10.5)
WBC # BLD: 17.38 K/UL — HIGH (ref 3.8–10.5)
WBC # BLD: 18.46 K/UL — HIGH (ref 3.8–10.5)
WBC # BLD: 18.68 K/UL — HIGH (ref 3.8–10.5)
WBC # BLD: 18.99 K/UL — HIGH (ref 3.8–10.5)
WBC # FLD AUTO: 13.95 K/UL — HIGH (ref 3.8–10.5)
WBC # FLD AUTO: 13.96 K/UL — HIGH (ref 3.8–10.5)
WBC # FLD AUTO: 14.57 K/UL — HIGH (ref 3.8–10.5)
WBC # FLD AUTO: 14.68 K/UL — HIGH (ref 3.8–10.5)
WBC # FLD AUTO: 17.38 K/UL — HIGH (ref 3.8–10.5)
WBC # FLD AUTO: 18.46 K/UL — HIGH (ref 3.8–10.5)
WBC # FLD AUTO: 18.68 K/UL — HIGH (ref 3.8–10.5)
WBC # FLD AUTO: 18.99 K/UL — HIGH (ref 3.8–10.5)

## 2020-02-02 PROCEDURE — 99232 SBSQ HOSP IP/OBS MODERATE 35: CPT

## 2020-02-02 PROCEDURE — 99233 SBSQ HOSP IP/OBS HIGH 50: CPT

## 2020-02-02 RX ORDER — DESMOPRESSIN ACETATE 0.1 MG/1
20 TABLET ORAL ONCE
Refills: 0 | Status: DISCONTINUED | OUTPATIENT
Start: 2020-02-02 | End: 2020-02-02

## 2020-02-02 RX ORDER — MAGNESIUM SULFATE 500 MG/ML
2 VIAL (ML) INJECTION ONCE
Refills: 0 | Status: COMPLETED | OUTPATIENT
Start: 2020-02-02 | End: 2020-02-02

## 2020-02-02 RX ORDER — SODIUM CHLORIDE 9 MG/ML
10 INJECTION INTRAMUSCULAR; INTRAVENOUS; SUBCUTANEOUS
Refills: 0 | Status: DISCONTINUED | OUTPATIENT
Start: 2020-02-02 | End: 2020-02-02

## 2020-02-02 RX ORDER — CHLORHEXIDINE GLUCONATE 213 G/1000ML
1 SOLUTION TOPICAL
Refills: 0 | Status: DISCONTINUED | OUTPATIENT
Start: 2020-02-02 | End: 2020-02-03

## 2020-02-02 RX ORDER — DESMOPRESSIN ACETATE 0.1 MG/1
20 TABLET ORAL ONCE
Refills: 0 | Status: COMPLETED | OUTPATIENT
Start: 2020-02-02 | End: 2020-02-02

## 2020-02-02 RX ORDER — HYDROMORPHONE HYDROCHLORIDE 2 MG/ML
0.5 INJECTION INTRAMUSCULAR; INTRAVENOUS; SUBCUTANEOUS
Refills: 0 | Status: DISCONTINUED | OUTPATIENT
Start: 2020-02-02 | End: 2020-02-03

## 2020-02-02 RX ADMIN — OXYCODONE HYDROCHLORIDE 5 MILLIGRAM(S): 5 TABLET ORAL at 21:00

## 2020-02-02 RX ADMIN — MEROPENEM 100 MILLIGRAM(S): 1 INJECTION INTRAVENOUS at 03:32

## 2020-02-02 RX ADMIN — Medication 50 GRAM(S): at 05:08

## 2020-02-02 RX ADMIN — GABAPENTIN 100 MILLIGRAM(S): 400 CAPSULE ORAL at 05:08

## 2020-02-02 RX ADMIN — OXYCODONE HYDROCHLORIDE 5 MILLIGRAM(S): 5 TABLET ORAL at 15:52

## 2020-02-02 RX ADMIN — Medication 100 MILLIGRAM(S): at 11:35

## 2020-02-02 RX ADMIN — BRIMONIDINE TARTRATE 1 DROP(S): 2 SOLUTION/ DROPS OPHTHALMIC at 05:08

## 2020-02-02 RX ADMIN — OXYCODONE HYDROCHLORIDE 10 MILLIGRAM(S): 5 TABLET ORAL at 11:31

## 2020-02-02 RX ADMIN — OXYCODONE HYDROCHLORIDE 10 MILLIGRAM(S): 5 TABLET ORAL at 12:00

## 2020-02-02 RX ADMIN — BRIMONIDINE TARTRATE 1 DROP(S): 2 SOLUTION/ DROPS OPHTHALMIC at 15:42

## 2020-02-02 RX ADMIN — FAMOTIDINE 20 MILLIGRAM(S): 10 INJECTION INTRAVENOUS at 11:35

## 2020-02-02 RX ADMIN — CHLORHEXIDINE GLUCONATE 1 APPLICATION(S): 213 SOLUTION TOPICAL at 05:08

## 2020-02-02 RX ADMIN — DESMOPRESSIN ACETATE 220 MICROGRAM(S): 0.1 TABLET ORAL at 22:19

## 2020-02-02 RX ADMIN — AMIODARONE HYDROCHLORIDE 200 MILLIGRAM(S): 400 TABLET ORAL at 05:08

## 2020-02-02 RX ADMIN — LATANOPROST 1 DROP(S): 0.05 SOLUTION/ DROPS OPHTHALMIC; TOPICAL at 15:41

## 2020-02-02 RX ADMIN — MICAFUNGIN SODIUM 105 MILLIGRAM(S): 100 INJECTION, POWDER, LYOPHILIZED, FOR SOLUTION INTRAVENOUS at 15:41

## 2020-02-02 RX ADMIN — OXYCODONE HYDROCHLORIDE 5 MILLIGRAM(S): 5 TABLET ORAL at 16:22

## 2020-02-02 RX ADMIN — OXYCODONE HYDROCHLORIDE 5 MILLIGRAM(S): 5 TABLET ORAL at 07:42

## 2020-02-02 RX ADMIN — OXYCODONE HYDROCHLORIDE 10 MILLIGRAM(S): 5 TABLET ORAL at 05:30

## 2020-02-02 RX ADMIN — Medication 1 TABLET(S): at 15:42

## 2020-02-02 RX ADMIN — OXYCODONE HYDROCHLORIDE 5 MILLIGRAM(S): 5 TABLET ORAL at 00:15

## 2020-02-02 RX ADMIN — GABAPENTIN 100 MILLIGRAM(S): 400 CAPSULE ORAL at 17:51

## 2020-02-02 RX ADMIN — OXYCODONE HYDROCHLORIDE 5 MILLIGRAM(S): 5 TABLET ORAL at 20:27

## 2020-02-02 RX ADMIN — OXYCODONE HYDROCHLORIDE 10 MILLIGRAM(S): 5 TABLET ORAL at 05:00

## 2020-02-02 RX ADMIN — BRIMONIDINE TARTRATE 1 DROP(S): 2 SOLUTION/ DROPS OPHTHALMIC at 21:25

## 2020-02-02 RX ADMIN — OXYCODONE HYDROCHLORIDE 5 MILLIGRAM(S): 5 TABLET ORAL at 08:15

## 2020-02-02 RX ADMIN — MEROPENEM 100 MILLIGRAM(S): 1 INJECTION INTRAVENOUS at 17:51

## 2020-02-02 NOTE — PROGRESS NOTE ADULT - ASSESSMENT
Sai Prado is a 50 y/o male presenting with influenza A with a hospital course complicated by:  - Infected transplant perinephric hematoma s/p right groin exploration, washout, & biopsy c/b persistent perinephric collection s/p FNA  - Right external iliac & common femoral DVTs s/p IVC filter & thrombectomy of the RLE veins  - Gross hematuria w/ pseudoaneurysm of the right external iliac & transplant renal artery seen on IR angiogram s/p right groin exploration, washout of the RP space, and transplant nephrectomy w/ repair of the right external iliac artery w/ a bovine patch  - Iliac fossa collection s/p IR drainage  - Cardiac arrest secondary to a saddle PE    PLAN:  Neuro: No active issues.   - Pain control Tylenol, oxycodone, gabapentin, dilaudid   - UE reported as spasms in L posterior axilla; denies LUE pain; Forearm soft; and to move wrist / fingers    Resp: saddle PE with pulmonary infarct sequela  - No active issues.   - Holding A/C for now given active extrav    CV: obstructive shock secondary to PE (resolved), s/p PEA arrest with progression to torsades  - Monitor vital signs  - Continue amiodarone prior Torsades  - ASA 81 qD  - Continue metoprolol 12.5 BID.   - On ASA 81.     GI: no acute issues  - Renal diet as tolerated.  - Continue pepcid.   - Bowel regimen with senna & Miralax.  - Continue wound vac.     Renal: CKD stage V s/p DDRT c/b DGF & perinephric collection s/p right groin exploration, washout, & biopsy c/b persistent perinephric collection s/p FNA; gross hematuria w/ pseudoaneurysm of the right external iliac & transplant renal artery s/p right groin exploration, washout of the RP space, & transplant nephrectomy w/ repair of the right external iliac artery w/ a bovine patch; persistent right iliac fossa collection s/p IR drainage  - Unable to receive dialysis today, will hold off on alternate dialysis access, Transplant team with no urgent needs for dialysis at this time.   - On nephro-candace.     Heme: anemia of chronic diseases, saddle PE, right external iliac & common femoral DVTs s/p IVC filter  - Holding Eliquis for now   - Continue Epogen.     ID: Enterobacter cloacae UTI, transplant perinephric collection and right iliac fossa collection with carbapenem resistant Enterobacter cloacae (CRE) and E. coli.  - On 6 week course of Meropenem, micafungin per Transplant, with eventual plans to switch to Per Os Ciprofloxacin on discharge.     Endo: No acute issues  - Monitor glucose on BMP    Misc: Gout, glaucoma  - Home allopurinol for gout  - Home eyedrops (latanoprost, dorzolamide, & brimonidine) for glaucoma    Disposition:  - Surgical Intensive Care Unit

## 2020-02-02 NOTE — PROGRESS NOTE ADULT - SUBJECTIVE AND OBJECTIVE BOX
Transplant Surgery - Multidisciplinary Rounds  --------------------------------------------------------------  R DDRT    Date:  12/8/19     Present: Patient seen with multidisciplinary team including Transplant Surgeon: Dr. Cruz. Transplant Nephrologist: Dr. Puckett. NP/PA Dino Aviles in am rounds and examined with Dr. Cruz.  Disciplines not in attendance will be notified of the plan.     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 Young, followed by Dr. Raphael, [facility] Oncologist), RA with hand contractures. He underwent DDRT on 12/8/19. Post op course c/b DGF requiring HD, thrombocytopenia, elevated LDH and haptoglobin. Schistocytes  on peripheral smear concerning for TMA. Envarsus held. Received Belatacept 12/13. Started on PLEX (12/12, 12/15). Underwent renal bx on 12/13 c/w profound ATN.  Discharged home on 12/20/19 w/ outpatient HD.     Re-admitted 12/27 with influenza, completed treatement with Tamiflu on 12/31. Urine cx on admission grew Ent cloacae, was started on meropenem 12/31. Renal US on admission with increasing hematoma.   ·	OR 1/1 for ex-lap, hematoma evacuation, washout and renal bx. (c/w ATN)  OR cxs grew CRE and e coli. Post op course c/b:   ·	fevers and leukocytosis, CT a/p (1/4) with increased perinephric collection. Underwent IR guided collection 1/6   ·	New onset hematuria, martinez inserted 1/7, started CBI  ·	RLE edema, RLE doppler (1/8) with acute above the knee thrombus of R external iliac, common femoral and femoral veins, Acute calf vein thrombus affecting R soleal vein, Heparin gtt initiated   ·	AMS 1/9  (head CT neg), hyponatremic  ·	Significant hematuria and bleeding around the martinez. Angio 1/9 showed actively bleeding pseudoaneurysm at the anastomosis of the transplant renal artery with the recipient iliac artery. A stent was placed in the iliac artery to  control hemorrhage. IVC filter placed.   ·	Emergent OR 1/9-1/10 for graft nephrectomy;  ureter to bladder anastomosis was completely disrupted, repaired the R external iliac artery with bovine pericardial patch, repaired right external iliac vein, performed thrombectomy of RLE veins, Intra op received 3u PRBC.   ·	Extubated 1/11  ·	1/13 Saddle PE, with right heart strain and complicated by PEA arrest followed by [place], ROSC achieved after ACLS; re-intubated  ·	1/15 Renal US with 13.7 x 5.0 x 1.5 cm in RLQ transplant bed; s/p bedside IR guided drainage, pigtail in place. Fluid cx to date with NG  ·	1/21 CT C/A/P showing persistent collection, catheter at superior portion of collection  ·	1/22 IR pigtail drain repositioned and upsized to 12Fr catheter w/ increased output  ·	1/23 Transitioned to Eliquis from Heparin drip    Interval events:    -Afebrile, stable vitals.  - S/P AFV cannulation for HD yesterday with infiltration causing extravasation/bleeding to the left upper arm and chest noted on CTA  - Was transferred to SICU, aborted HD, received 3 units PRBC for drop in H&H  - H&H this am continue to drop  - Left femoral cortis placed by ICU team for access  - Eliquis placed on hold  -LE w/ minimal improvement in edema.   - Plts trending down 358 Heme following   -Hemolysis work-up in progress, will f/u heme recs.  -On meropenem and micafungin.       cxs:   12/29: Urine Cx: Enterobacter Cloacae  1/1 OR Perinephric collection Cx:  Carbapenem resistant Ent Cloacae, E. Coli  1/1: R kidney abscess swab: Enterobacter Cloacae  1/1  OR tissue: Enterobacter Cloacae  1/5: Urine Cx: Enteropbacter Cloacae  1/5: Skin incision Cx (bedside): NG  1/6: Adominal fluid Cx from IR:  NG   1/7: Urine Cx:  NG  1/7: BCX2:  NG  1/10 retroperit hematoma - enerobacter   1/10 bladder hematoma - enterobacter  1/15 Drainage fluid - NGTD  1/16: BC X2: NGTD  1/18: SPutum: Normal harvey  1/21: BC X2: NTGD  1/22: IR body fluid Cx: GNR pre-milian    Potential Discharge date: pending clinical improvement        MEDICATIONS  (STANDING):  allopurinol 100 milliGRAM(s) Oral daily  aMIOdarone    Tablet 200 milliGRAM(s) Oral daily  brimonidine 0.2% Ophthalmic Solution 1 Drop(s) Both EYES three times a day  chlorhexidine 2% Cloths 1 Application(s) Topical <User Schedule>  chlorhexidine 4% Liquid 1 Application(s) Topical <User Schedule>  epoetin trey Injectable 77088 Unit(s) IV Push <User Schedule>  famotidine    Tablet 20 milliGRAM(s) Oral daily  gabapentin 100 milliGRAM(s) Oral every 12 hours  latanoprost 0.005% Ophthalmic Solution 1 Drop(s) Both EYES daily  meropenem  IVPB 500 milliGRAM(s) IV Intermittent every 12 hours  micafungin IVPB 100 milliGRAM(s) IV Intermittent every 24 hours  Nephro-candace 1 Tablet(s) Oral daily    MEDICATIONS  (PRN):  acetaminophen   Tablet .. 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  Carpal tunnel syndrome  Brain cyst: had MRI recently- now gone  Anemia  Gastric ulcer: Long time ago  Rheumatoid arthritis  Renal transplant recipient  Breakdown (mechanical) of penile (implanted) prosthesis, sequela  End-stage renal disease (ESRD): PERMACATH PLACEMENT  Abscess of left buttock: s/p I &amp; D  AV fistula: placement left lower arm  S/P cystoscopy: stent placement &amp; removal for kidney stones, lithiotripsy  s/p Lt Carpal tunnel: 2011      Vital Signs Last 24 Hrs  T(C): 36.5 (02 Feb 2020 07:00), Max: 37.7 (01 Feb 2020 15:00)  T(F): 97.7 (02 Feb 2020 07:00), Max: 99.9 (01 Feb 2020 15:00)  HR: 67 (02 Feb 2020 10:00) (56 - 100)  BP: 106/57 (02 Feb 2020 10:00) (81/45 - 157/81)  BP(mean): 76 (02 Feb 2020 10:00) (58 - 104)  RR: 11 (02 Feb 2020 10:00) (7 - 21)  SpO2: 100% (02 Feb 2020 10:00) (95% - 100%)    I&O's Summary    01 Feb 2020 07:01  -  02 Feb 2020 07:00  --------------------------------------------------------  IN: 1695 mL / OUT: 120 mL / NET: 1575 mL                              7.1    14.57 )-----------( 369      ( 02 Feb 2020 08:10 )             21.0     02-02    135  |  99  |  28<H>  ----------------------------<  110<H>  4.6   |  24  |  5.67<H>    Ca    7.7<L>      02 Feb 2020 03:50  Phos  4.9     02-02  Mg     1.9     02-02    TPro  4.2<L>  /  Alb  1.6<L>  /  TBili  0.7  /  DBili  0.4<H>  /  AST  16  /  ALT  6<L>  /  AlkPhos  184<H>  02-02    REVIEW OF SYSTEMS  Gen: + weakness   Skin: No rashes  Head/Eyes/Ears/Mouth: No headache; Normal hearing; Normal vision w/o blurriness; No sinus pain/discomfort, sore throat  Respiratory: no SOB  CV: No chest pain, PND, orthopnea  GI: incisional tenderness   : anuric  MSK: LUE+left chest with +3 edema firm to touch, + lower extremity edema, RUE edema, flank edema  Neuro: No dizziness/lightheadedness, weakness, seizures, numbness, tingling  Heme: No easy bruising or bleeding  Endo: No heat/cold intolerance  Psych: No significant nervousness, anxiety, stress, depression      PHYSICAL EXAM:    Constitutional: NAD  	Eyes: Anicteric, PERRLA  	Respiratory: CTA b/l  	Cardiovascular: RRR  	Gastrointestinal: Soft abdomen, appropriate incisional TTP, ND.  wound vac ss/murky output (wound seen during recent wound vac change, good granulation tissue developing), RLQ drain with SS drainage    Genitourinary:  anuric, large amount scrotal edema     Extremities: LUE+left chest with +3 edema firm to touch, b/l 2-3+ lower ext edema ace wrapped ( improved since yesterday); removed ACE wrap this AM and applied compression stockings; no ulcerations or skin breakdown, flank edema, RUE edema slow improvement   	Vascular: DP signal bilateral, L AVF palpable  	Neurological: A&O x3.  	Skin: no new lesions/ulcerations  	Musculoskeletal: Moving all extremities

## 2020-02-02 NOTE — PROGRESS NOTE ADULT - PROBLEM SELECTOR PLAN 2
Eliquis held s/p arm/ chest hematoma. Has filter in place for PE and LE clot.  Restart Eliquis when not actively bleeding.

## 2020-02-02 NOTE — PROGRESS NOTE ADULT - SUBJECTIVE AND OBJECTIVE BOX
[facility] - Melissa Kelsey, Enrique, Maximino, Marin, Ravi Goodson  Office Number:  [phone]    Patient resting comfortably in bed in NAD.  Laying flat with no respiratory distress.  No complaints of chest pain, dyspnea, palpitations, PND, or orthopnea.  very upset about his situation  transferred to the SICU yesterday because of active extravasation at his fistula    ROS: negative unless otherwise mentioned.    Telemetry:  sr    MEDICATIONS  (STANDING):  allopurinol 100 milliGRAM(s) Oral daily  aMIOdarone    Tablet 200 milliGRAM(s) Oral daily  brimonidine 0.2% Ophthalmic Solution 1 Drop(s) Both EYES three times a day  chlorhexidine 2% Cloths 1 Application(s) Topical <User Schedule>  chlorhexidine 4% Liquid 1 Application(s) Topical <User Schedule>  epoetin trey Injectable 23893 Unit(s) IV Push <User Schedule>  famotidine    Tablet 20 milliGRAM(s) Oral daily  gabapentin 100 milliGRAM(s) Oral every 12 hours  latanoprost 0.005% Ophthalmic Solution 1 Drop(s) Both EYES daily  meropenem  IVPB 500 milliGRAM(s) IV Intermittent every 12 hours  micafungin IVPB 100 milliGRAM(s) IV Intermittent every 24 hours  Nephro-candace 1 Tablet(s) Oral daily    MEDICATIONS  (PRN):  acetaminophen   Tablet .. 975 milliGRAM(s) Oral every 6 hours PRN Mild Pain (1 - 3)  HYDROmorphone  Injectable 0.5 milliGRAM(s) IV Push every 2 hours PRN breakthrough pain  oxyCODONE    IR 10 milliGRAM(s) Oral every 6 hours PRN Severe Pain (7 - 10)  oxyCODONE    IR 5 milliGRAM(s) Oral every 4 hours PRN Moderate Pain (4 - 6)      Allergies    coconut (Anaphylaxis)  morphine (Pruritus; Rash)    Intolerances        Vital Signs Last 24 Hrs  T(C): 36.5 (02 Feb 2020 07:00), Max: 37.7 (01 Feb 2020 15:00)  T(F): 97.7 (02 Feb 2020 07:00), Max: 99.9 (01 Feb 2020 15:00)  HR: 67 (02 Feb 2020 10:00) (56 - 100)  BP: 106/57 (02 Feb 2020 10:00) (81/45 - 157/81)  BP(mean): 76 (02 Feb 2020 10:00) (58 - 104)  RR: 11 (02 Feb 2020 10:00) (7 - 21)  SpO2: 100% (02 Feb 2020 10:00) (95% - 100%)    I&O's Summary    01 Feb 2020 07:01  -  02 Feb 2020 07:00  --------------------------------------------------------  IN: 1695 mL / OUT: 120 mL / NET: 1575 mL        ON EXAM:    Constitutional: NAD, awake    HEENT: Moist Mucous Membranes, Anicteric  Pulmonary: Decreased breath sounds b/l. No rales, crackles or wheeze appreciated.   Cardiovascular: Regular, S1 and S2, No murmurs, rubs, gallops or clicks  Gastrointestinal: Bowel Sounds present, soft, nontender.   Lymph: + peripheral edema, legs wrapped jolynn. No lymphadenopathy.  Skin: No visible rashes or ulcers.  Psych:  Mood & affect appropriate for situation  Significant swelling of his lue    LABS: All Labs Reviewed:                        7.1    14.57 )-----------( 369      ( 02 Feb 2020 08:10 )             21.0                         7.8    14.68 )-----------( 358      ( 02 Feb 2020 03:50 )             22.8                         8.2    13.95 )-----------( 374      ( 02 Feb 2020 01:09 )             25.2     02 Feb 2020 03:50    135    |  99     |  28     ----------------------------<  110    4.6     |  24     |  5.67   01 Feb 2020 23:28    132    |  97     |  27     ----------------------------<  116    4.2     |  24     |  5.57   01 Feb 2020 17:39    135    |  98     |  26     ----------------------------<  109    4.3     |  24     |  5.43     Ca    7.7        02 Feb 2020 03:50  Ca    7.8        01 Feb 2020 23:28  Ca    8.4        01 Feb 2020 17:39  Phos  4.9       02 Feb 2020 03:50  Phos  4.4       01 Feb 2020 23:28  Phos  3.7       01 Feb 2020 06:29  Mg     1.9       02 Feb 2020 03:50  Mg     1.8       01 Feb 2020 23:28  Mg     2.0       01 Feb 2020 06:29    TPro  4.2    /  Alb  1.6    /  TBili  0.7    /  DBili  0.4    /  AST  16     /  ALT  6      /  AlkPhos  184    02 Feb 2020 03:50  TPro  4.2    /  Alb  1.8    /  TBili  0.5    /  DBili  0.3    /  AST  15     /  ALT  5      /  AlkPhos  199    01 Feb 2020 23:28  TPro  6.1    /  Alb  2.1    /  TBili  0.6    /  DBili  0.2    /  AST  31     /  ALT  9      /  AlkPhos  308    01 Feb 2020 06:29    PT/INR - ( 02 Feb 2020 03:50 )   PT: 18.8 sec;   INR: 1.62 ratio         PTT - ( 02 Feb 2020 03:50 )  PTT:37.4 sec  CARDIAC MARKERS ( 01 Feb 2020 17:39 )  x     / x     / 37 U/L / x     / x          Blood Culture:

## 2020-02-02 NOTE — PROGRESS NOTE ADULT - SUBJECTIVE AND OBJECTIVE BOX
Surgery Progress Note  Patient is a 49y old  Male who presents with a chief complaint of Fever (02 Feb 2020 11:12)      SUBJECTIVE: Patient seen and examined at bedside with surgical team.   patient continues to have arm pain, but is neurovascularly intact     24 HOUR EVENTS:  - Concern for LUE compartment Sx - continued regular compartment checks  - Given 3 units total during day (1) + night (2);  was HD unstable at night w/ SBP in 70s so given 2 units  - L fem cordis placed  - CTA UE - active extravasation  - Vascular and transplant aware     Vital Signs Last 24 Hrs  T(C): 36.4 (02 Feb 2020 11:00), Max: 37.7 (01 Feb 2020 15:00)  T(F): 97.5 (02 Feb 2020 11:00), Max: 99.9 (01 Feb 2020 15:00)  HR: 65 (02 Feb 2020 12:00) (56 - 74)  BP: 119/61 (02 Feb 2020 12:00) (81/45 - 157/81)  BP(mean): 83 (02 Feb 2020 12:00) (58 - 104)  RR: 15 (02 Feb 2020 12:00) (7 - 22)  SpO2: 100% (02 Feb 2020 12:00) (98% - 100%)    Physical Exam  Constitutional: NAD  Respiratory: breathing comfortably on RA  Ext: RUE: palpable radial pulse   Muscular: RUE: no wrist drop, 5/5 hand strength     I&O's Detail    01 Feb 2020 07:01  -  02 Feb 2020 07:00  --------------------------------------------------------  IN:    Oral Fluid: 395 mL    Packed Red Blood Cells: 1050 mL    Solution: 100 mL    Solution: 100 mL    Solution: 50 mL  Total IN: 1695 mL    OUT:    Bulb: 120 mL  Total OUT: 120 mL    Total NET: 1575 mL      02 Feb 2020 07:01  -  02 Feb 2020 13:15  --------------------------------------------------------  IN:    Packed Red Blood Cells: 300 mL  Total IN: 300 mL    OUT:  Total OUT: 0 mL    Total NET: 300 mL      MEDICATIONS  (STANDING):  allopurinol 100 milliGRAM(s) Oral daily  aMIOdarone    Tablet 200 milliGRAM(s) Oral daily  brimonidine 0.2% Ophthalmic Solution 1 Drop(s) Both EYES three times a day  chlorhexidine 2% Cloths 1 Application(s) Topical <User Schedule>  chlorhexidine 4% Liquid 1 Application(s) Topical <User Schedule>  epoetin trey Injectable 73384 Unit(s) IV Push <User Schedule>  famotidine    Tablet 20 milliGRAM(s) Oral daily  gabapentin 100 milliGRAM(s) Oral every 12 hours  latanoprost 0.005% Ophthalmic Solution 1 Drop(s) Both EYES daily  meropenem  IVPB 500 milliGRAM(s) IV Intermittent every 12 hours  micafungin IVPB 100 milliGRAM(s) IV Intermittent every 24 hours  Nephro-candace 1 Tablet(s) Oral daily    MEDICATIONS  (PRN):  acetaminophen   Tablet .. 975 milliGRAM(s) Oral every 6 hours PRN Mild Pain (1 - 3)  HYDROmorphone  Injectable 0.5 milliGRAM(s) IV Push every 2 hours PRN breakthrough pain  oxyCODONE    IR 10 milliGRAM(s) Oral every 6 hours PRN Severe Pain (7 - 10)  oxyCODONE    IR 5 milliGRAM(s) Oral every 4 hours PRN Moderate Pain (4 - 6)      LABS:                        8.2    13.96 )-----------( 362      ( 02 Feb 2020 12:16 )             23.8     02-02    135  |  99  |  28<H>  ----------------------------<  110<H>  4.6   |  24  |  5.67<H>    Ca    7.7<L>      02 Feb 2020 03:50  Phos  4.9     02-02  Mg     1.9     02-02    TPro  4.2<L>  /  Alb  1.6<L>  /  TBili  0.7  /  DBili  0.4<H>  /  AST  16  /  ALT  6<L>  /  AlkPhos  184<H>  02-02    PT/INR - ( 02 Feb 2020 03:50 )   PT: 18.8 sec;   INR: 1.62 ratio         PTT - ( 02 Feb 2020 03:50 )  PTT:37.4 sec  LIVER FUNCTIONS - ( 02 Feb 2020 03:50 )  Alb: 1.6 g/dL / Pro: 4.2 g/dL / ALK PHOS: 184 U/L / ALT: 6 U/L / AST: 16 U/L / GGT: x Surgery Progress Note  Patient is a 49y old  Male who presents with a chief complaint of Fever (02 Feb 2020 11:12)      SUBJECTIVE: Patient seen and examined at bedside with surgical team.   patient continues to have arm pain, but is neurovascularly intact     24 HOUR EVENTS:  - Concern for LUE compartment Sx - continued regular compartment checks  - Given 3 units total during day (1) + night (2);  was HD unstable at night w/ SBP in 70s so given 2 units  - L fem cordis placed  - CTA UE - active extravasation  - Vascular and transplant aware     Vital Signs Last 24 Hrs  T(C): 36.4 (02 Feb 2020 11:00), Max: 37.7 (01 Feb 2020 15:00)  T(F): 97.5 (02 Feb 2020 11:00), Max: 99.9 (01 Feb 2020 15:00)  HR: 65 (02 Feb 2020 12:00) (56 - 74)  BP: 119/61 (02 Feb 2020 12:00) (81/45 - 157/81)  BP(mean): 83 (02 Feb 2020 12:00) (58 - 104)  RR: 15 (02 Feb 2020 12:00) (7 - 22)  SpO2: 100% (02 Feb 2020 12:00) (98% - 100%)    Physical Exam  Constitutional: NAD  Respiratory: breathing comfortably on RA  Ext: Left arm: palpable radial pulse   Muscular: Left arm: no wrist drop, 5/5 hand strength     I&O's Detail    01 Feb 2020 07:01  -  02 Feb 2020 07:00  --------------------------------------------------------  IN:    Oral Fluid: 395 mL    Packed Red Blood Cells: 1050 mL    Solution: 100 mL    Solution: 100 mL    Solution: 50 mL  Total IN: 1695 mL    OUT:    Bulb: 120 mL  Total OUT: 120 mL    Total NET: 1575 mL      02 Feb 2020 07:01  -  02 Feb 2020 13:15  --------------------------------------------------------  IN:    Packed Red Blood Cells: 300 mL  Total IN: 300 mL    OUT:  Total OUT: 0 mL    Total NET: 300 mL      MEDICATIONS  (STANDING):  allopurinol 100 milliGRAM(s) Oral daily  aMIOdarone    Tablet 200 milliGRAM(s) Oral daily  brimonidine 0.2% Ophthalmic Solution 1 Drop(s) Both EYES three times a day  chlorhexidine 2% Cloths 1 Application(s) Topical <User Schedule>  chlorhexidine 4% Liquid 1 Application(s) Topical <User Schedule>  epoetin trey Injectable 41600 Unit(s) IV Push <User Schedule>  famotidine    Tablet 20 milliGRAM(s) Oral daily  gabapentin 100 milliGRAM(s) Oral every 12 hours  latanoprost 0.005% Ophthalmic Solution 1 Drop(s) Both EYES daily  meropenem  IVPB 500 milliGRAM(s) IV Intermittent every 12 hours  micafungin IVPB 100 milliGRAM(s) IV Intermittent every 24 hours  Nephro-candace 1 Tablet(s) Oral daily    MEDICATIONS  (PRN):  acetaminophen   Tablet .. 975 milliGRAM(s) Oral every 6 hours PRN Mild Pain (1 - 3)  HYDROmorphone  Injectable 0.5 milliGRAM(s) IV Push every 2 hours PRN breakthrough pain  oxyCODONE    IR 10 milliGRAM(s) Oral every 6 hours PRN Severe Pain (7 - 10)  oxyCODONE    IR 5 milliGRAM(s) Oral every 4 hours PRN Moderate Pain (4 - 6)      LABS:                        8.2    13.96 )-----------( 362      ( 02 Feb 2020 12:16 )             23.8     02-02    135  |  99  |  28<H>  ----------------------------<  110<H>  4.6   |  24  |  5.67<H>    Ca    7.7<L>      02 Feb 2020 03:50  Phos  4.9     02-02  Mg     1.9     02-02    TPro  4.2<L>  /  Alb  1.6<L>  /  TBili  0.7  /  DBili  0.4<H>  /  AST  16  /  ALT  6<L>  /  AlkPhos  184<H>  02-02    PT/INR - ( 02 Feb 2020 03:50 )   PT: 18.8 sec;   INR: 1.62 ratio         PTT - ( 02 Feb 2020 03:50 )  PTT:37.4 sec  LIVER FUNCTIONS - ( 02 Feb 2020 03:50 )  Alb: 1.6 g/dL / Pro: 4.2 g/dL / ALK PHOS: 184 U/L / ALT: 6 U/L / AST: 16 U/L / GGT: x

## 2020-02-02 NOTE — CHART NOTE - NSCHARTNOTEFT_GEN_A_CORE
------------------------------------------------------------  Interventional Radiology Pre-Procedure Note  ------------------------------------------------------------    Procedure: left upper extremity angiogram, possible embolization/stent placement    Indication: 49y Male with extensive hospital course including hemodialysis via a left arm AVF. Following access of the fistula for dialysis the patient developed bleeding with very large left arm and left chest wall hematoma. CTA was performed demonstrating the hematoma and active extravasation of contrast, of unclear source (arterial vs fistula outflow vein). Patient has been managed in SICU for past 2 days requiring 5U pRBC with several episodes of hypotension, with persistent downtrending of Hb, most recently 7.0. Patient is referred to interventional radiology for left upper extremity arteriography and potential stent placement or embolization.    Past Medical History:    Erectile dysfunction  Glaucoma  Gout  HTN (hypertension)  ESRD (end stage renal disease) on dialysis  Contracture of finger joint  Carpal tunnel syndrome  Brain cyst  Anemia  Renal insufficiency  Gastric ulcer  Rheumatoid arthritis      Allergies: coconut (Anaphylaxis)  morphine (Pruritus; Rash)      Vital Signs: T(F): 98.4 (19:00), Max: 98.4 (19:00)  HR: 71 (22:30)  BP: 135/60 (22:30)  RR: 9 (22:30)  SpO2: 100% (22:30)    Labs:           8.1  18.99)-----(422     (02-02-20 @ 22:51)         24.1     135 | 99 | 28  ----------------------< 110     (02-02-20 @ 03:50)  4.6 | 24 | 5.67       PT: 18.8<H> 02-02-20 @ 03:50  aPTT: 37.4<H> 02-02-20 @ 03:50   INR: 1.62<H> 02-02-20 @ 03:50      Imaging: CTA reviewed, active extravasation of contrast in left arm, unclear if source is arterial vs outflow vein vs multiple sources    Consent: The risks, benefits and alternatives of the procedure were discussed with the patient, who verbalized understanding. Signed consent is available in the paper chart.    Plan: left upper extremity arteriography, possible embolization, possible stent placement    Everton Cortes MD  [id number] Interventional Radiology Resident  (x) [phone]

## 2020-02-02 NOTE — PROGRESS NOTE ADULT - SUBJECTIVE AND OBJECTIVE BOX
[facility] DIVISION OF KIDNEY DISEASES AND HYPERTENSION -- FOLLOW UP NOTE  --------------------------------------------------------------------------------  Chief Complaint:  transplant failure    24 hour events/subjective:  Pts fistula infiltrated during dialysis and now with hematoma in fistula arm and chest.  Receiving 4th unit blood today.  Has pain over arm.       PAST HISTORY  --------------------------------------------------------------------------------  No significant changes to PMH, PSH, FHx, SHx, unless otherwise noted    ALLERGIES & MEDICATIONS  --------------------------------------------------------------------------------  Allergies    coconut (Anaphylaxis)  morphine (Pruritus; Rash)    Intolerances      Standing Inpatient Medications  allopurinol 100 milliGRAM(s) Oral daily  aMIOdarone    Tablet 200 milliGRAM(s) Oral daily  brimonidine 0.2% Ophthalmic Solution 1 Drop(s) Both EYES three times a day  chlorhexidine 2% Cloths 1 Application(s) Topical <User Schedule>  chlorhexidine 4% Liquid 1 Application(s) Topical <User Schedule>  epoetin trey Injectable 18665 Unit(s) IV Push <User Schedule>  famotidine    Tablet 20 milliGRAM(s) Oral daily  gabapentin 100 milliGRAM(s) Oral every 12 hours  latanoprost 0.005% Ophthalmic Solution 1 Drop(s) Both EYES daily  meropenem  IVPB 500 milliGRAM(s) IV Intermittent every 12 hours  micafungin IVPB 100 milliGRAM(s) IV Intermittent every 24 hours  Nephro-candace 1 Tablet(s) Oral daily    PRN Inpatient Medications  acetaminophen   Tablet .. 975 milliGRAM(s) Oral every 6 hours PRN  HYDROmorphone  Injectable 0.5 milliGRAM(s) IV Push every 2 hours PRN  oxyCODONE    IR 10 milliGRAM(s) Oral every 6 hours PRN  oxyCODONE    IR 5 milliGRAM(s) Oral every 4 hours PRN      REVIEW OF SYSTEMS  --------------------------------------------------------------------------------  Gen: weakness  Skin: No rashes  Head/Eyes/Ears/Mouth: No headache;No sore throat  Respiratory: No dyspnea, cough,   CV: No chest pain, PND, orthopnea  GI: No abdominal pain, diarrhea, constipation, nausea, vomiting  Transplant: No pain  : No increased frequency, dysuria, hematuria, nocturia  MSK: legs swollen, has left sided arm and chest pain.   Neuro: No dizziness/lightheadedness, weakness, seizures, numbness, tingling  Psych: No significant nervousness, anxiety, stress, depression    All other systems were reviewed and are negative, except as noted.    VITALS/PHYSICAL EXAM  --------------------------------------------------------------------------------  T(C): 36.5 (02-02-20 @ 07:00), Max: 37.7 (02-01-20 @ 15:00)  HR: 67 (02-02-20 @ 10:00) (56 - 100)  BP: 106/57 (02-02-20 @ 10:00) (81/45 - 157/81)  RR: 11 (02-02-20 @ 10:00) (7 - 21)  SpO2: 100% (02-02-20 @ 10:00) (95% - 100%)  Wt(kg): --        02-01-20 @ 07:01  -  02-02-20 @ 07:00  --------------------------------------------------------  IN: 1695 mL / OUT: 120 mL / NET: 1575 mL      Physical Exam:  	Gen: NAD  	HEENT: PERRL, supple neck, clear oropharynx  	Pulm: CTA B/L  	CV: RRR, S1S2; no rub.  Left chest wall swelling.   	Back: No spinal or CVA tenderness; no sacral edema  	Abd: +BS, soft, nontender/nondistended, wound vac and BO drain in place.   	: No suprapubic tenderness  	UE: Warm, FROM; no edema; no asterixis  	LE: +2 b/l LE edema and left UE edema.    	Neuro: No focal deficits  	Psych: Normal affect and mood  	Skin: Warm, without rashes      LABS/STUDIES  --------------------------------------------------------------------------------              7.1    14.57 >-----------<  369      [02-02-20 @ 08:10]              21.0     135  |  99  |  28  ----------------------------<  110      [02-02-20 @ 03:50]  4.6   |  24  |  5.67        Ca     7.7     [02-02-20 @ 03:50]      Mg     1.9     [02-02-20 @ 03:50]      Phos  4.9     [02-02-20 @ 03:50]    TPro  4.2  /  Alb  1.6  /  TBili  0.7  /  DBili  0.4  /  AST  16  /  ALT  6   /  AlkPhos  184  [02-02-20 @ 03:50]    PT/INR: PT 18.8 , INR 1.62       [02-02-20 @ 03:50]  PTT: 37.4       [02-02-20 @ 03:50]    CK 37      [02-01-20 @ 17:39]    Creatinine Trend:  SCr 5.67 [02-02 @ 03:50]  SCr 5.57 [02-01 @ 23:28]  SCr 5.43 [02-01 @ 17:39]  SCr 4.91 [02-01 @ 06:29]  SCr 3.88 [01-31 @ 06:23]          BK Virus DNA by PCR:   [facility] Assay Range: 39 copies/mL to 1.00E+10 copies/mL  The limit of quantitation (LOQ) is 39 copies/mL. BK virus DNA  detected below the LOQ will be reported as Detected:<39 copies/mL.  This test was developed and its performance characteristics  determined by [facility]. It has not been cleared or approved  by the U.S. Food and Drug Administration. Results should be used in  conjunction with clinical findings, and should not form the sole  basis for a diagnosis or treatment decision.  ____________________________________________________________  Performed at:  [facility]  [address]  [place] [address]  Laura Pettit PhD HCLD(ABB)  CLIA# [id number] (01-14 @ 11:42)          Iron 23, TIBC 189, %sat 12      [01-27-20 @ 14:03]  HbA1c 5.2      [01-13-20 @ 22:59]    HBsAb 351.0      [01-11-20 @ 16:09]  HBsAg Nonreact      [01-11-20 @ 16:09]  HBcAb Nonreact      [01-11-20 @ 16:09]  HCV 0.09, Nonreact      [01-11-20 @ 16:09]

## 2020-02-02 NOTE — PRE-ANESTHESIA EVALUATION ADULT - NSRADCARDRESULTSFT_GEN_ALL_CORE
< from: Transthoracic Echocardiogram (01.15.20 @ 08:54) >    Conclusions:  Focused TTE to re-evaluate the left ventricle and right  ventricle.  1. Endocardium not well visualized; grossly normal left  ventricular systolic function. Paradoxical septal motion  consistent with right ventricular overload.  2. Mild right ventricular enlargement with overall midlly  decreased right ventricular systolic function. TAPSE=1.3  cm. Right ventricular systolic dysfunction with preserved  apical wall motion is seen suggestive of Crystal's sign.  The RV apex is hyperkinetic.  3. Estimated pulmonary artery systolic pressure equals 45  mm Hg, assuming right atrial pressure equals 8 mm Hg,  consistent with mild pulmonary pressures.  *** Compared with echocardiogram of 1/13/2020, the right  ventricle and estimated PA systolic pressures appear  slightly improved. The LV was hyperdynamic on the prior  study.    < end of copied text >

## 2020-02-02 NOTE — CHART NOTE - NSCHARTNOTEFT_GEN_A_CORE
Risk of open LUE exploration and evacuation of hematoma continue to outweigh benefits in this patient who is still anti-coagulated. Hemodynamically stable throughout the day. Continues to receive blood products PRN.    At this point, agree with decision to consult interventional radiology for angiogram, with possible intervention. Risk of open LUE exploration and evacuation of hematoma continue to outweigh benefits in this patient who is still anti-coagulated. Hemodynamically stable throughout the day. Continues to receive blood products PRN.    At this point, agree with decision to consult interventional radiology for angiogram, with possible intervention.      Vascular surgery attending  Agree with above  CTA reviewed  case d/w IR

## 2020-02-02 NOTE — PROGRESS NOTE ADULT - ATTENDING COMMENTS
HCT keeps on dropping, received 4 units of PRBC so far. Suspect ongoing active bleed.  Discussed with Dr Reyes, deemed not an ideal candidate for vascular surgery intervention, she is agreeable to have IR procedure  Spoke to Adonis, IR Fellow, he will get back to me

## 2020-02-02 NOTE — PROGRESS NOTE ADULT - ASSESSMENT
Assessment: L AVF infiltration with likely small amount of ongoing active bleeding with large LUE hematoma while patient was on therapeutic anticoagulation    Plan   - continue to hold anticoagulation   - no vascular surgery intervention at this time  - continue to monitor neurovascular exam. no neurovascular deficits noted at this time   - continue excellent care per sicu

## 2020-02-02 NOTE — PROGRESS NOTE ADULT - SUBJECTIVE AND OBJECTIVE BOX
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 Patient was admitted and hospital course is as follows:    12/27 - started on Tamiflu, Duplex of the transplanted kidney demonstrated an increase in the perinephric hematoma from 5.7 cm to 9.4 cm  12/29 - patient reporting dysuria so urine culture sent, which was positive for Enterobacter cloacae  01/01 - s/p right groin exploration, washout of the perinephric hematoma, and biopsy of the transplanted kidney, cultures of the hematoma positive for Enterobacter cloacae & E. coli, biopsy demonstrates  01/05 - patient noted to be febrile and hypotensive with new episodes of wide complex tachycardia on telemetry, labs significant for worsening leukocytosis, cultures sent, CT scan obtained & demonstrated a persistent perinephric collection, admitted to SICU for hemodynamic monitoring  01/06 - FNA w/ IR with aspiration of 18 mL of clear & pale yellow fluid but cultures negative for any growth, patient began having gross hematuria requiring CBI  01/07 - repeat imaging demonstrated no change in the size of the perinephric collection  01/08 - RLE edema was noted so a LE Duplex was obtained, which demonstrated a right external iliac & common femoral vein DVT, vascular surgery consulted, patient started on a heparin infusion  01/10 - patient became altered with worsening hematuria despite CBI, patient was taken to IR for an angiogram, which demonstrated a pseudoaneurysm of the transplant renal artery & external iliac artery anastomosis so the pseudoaneurysm was stented & an IVC filter was placed, patient taken to the OR and is s/p right groin exploration, washout of the RP space, transplant nephrectomy w/ repair of the right external iliac artery w/ a bovine patch & removal of the stent placed by IR, and thrombectomy of the RLE veins, returned to SICU still intubated  01/13 - acutely bradycardic and hypotensive progressing into PEA arrest with progression into torsades requiring ACLS for a total of ~12 mins, imaging revealed saddle PE with TTE demonstrating RV strain, PERT team consulted, management with heparin infusion as patient is not a tPA candidate, did not require vasopressors shortly after ROSC  01/15 - ultrasound with large right iliac fossa collection s/p percutaneous drainage by IR, 220 mL of dark red fluid drained and 10 Fr drain was left in placed  01/16 - extubated  01/19 - RUE Duplex with thrombosis of the brachial & cephalic veins, RLQ ultrasound with interval decrease in right iliac fossa collection  01/21 - CTA chest and CT abdomen & pelvis obtained demonstrating known RLL and LLL lobar artery PEs but resolution of PE in bilateral pulmonary arteries, new BLL wedge-like opacities likely representing pulmonary infarcts, and interval decrease in RLQ fluid collection  01/22 - RLQ IR drain upsized by IR from 10 Fr to 12 Fr  01/23 - Changed heparin infusion to Eliquis, stable for transfer to floor on 1/25.   1/23-2/1: No acute issues, rehab planning. However on 2/1 while undergoing  hemodialysis through L AVF, had massive infiltration, transferred to Surgical Intensive Care Unit for q1h neurovascular checks with concern for upper arm compartment syndrome.         24 HOUR EVENTS:  - Concern for LUE compartment Sx - continued regular compartment checks  - Given 3 units total during day (1) + night (2);  was HD unstable at night w/ SBP in 70s so given 2 units  - L fem cordis placed  - CTA UE - active extravasation  - Vascular and transplant aware     SUBJECTIVE/ROS:  [x ] A ten-point review of systems was otherwise negative except as noted.  [ ] Due to altered mental status/intubation, subjective information were not able to be obtained from the patient. History was obtained, to the extent possible, from review of the chart and collateral sources of information.      NEURO  Exam: AOx3  Meds: acetaminophen   Tablet .. 975 milliGRAM(s) Oral every 6 hours PRN Mild Pain (1 - 3)  gabapentin 100 milliGRAM(s) Oral every 12 hours  oxyCODONE    IR 10 milliGRAM(s) Oral every 6 hours PRN Severe Pain (7 - 10)  oxyCODONE    IR 5 milliGRAM(s) Oral every 4 hours PRN Moderate Pain (4 - 6)    [x] Adequacy of sedation and pain control has been assessed and adjusted      RESPIRATORY  RR: 10 (02-02-20 @ 00:00) (9 - 21)  SpO2: 100% (02-02-20 @ 00:00) (95% - 100%)  Wt(kg): --  Exam: unlabored, clear to auscultation bilaterally  Mechanical Ventilation:     [ ] Extubation Readiness Assessed  Meds:       CARDIOVASCULAR  HR: 64 (02-02-20 @ 00:00) (60 - 100)  BP: 91/50 (02-02-20 @ 00:00) (81/45 - 157/81)  BP(mean): 65 (02-02-20 @ 00:00) (58 - 104)  ABP: --  ABP(mean): --  Wt(kg): --  CVP(cm H2O): --  VBG - ( 01 Feb 2020 16:36 )  pH: 7.43  /  pCO2: 46    /  pO2: 28    / HCO3: 31    / Base Excess: 6.2   /  SaO2: 49     Lactate: 0.9      Exam:  Cardiac Rhythm:  Perfusion     [x ]Adequate   [ ]Inadequate  Mentation   [ x]Normal       [ ]Reduced  Extremities  [ x]Warm         [ ]Cool  Volume Status [ ]Hypervolemic [ ]Euvolemic [ x]Hypovolemic  Meds: aMIOdarone    Tablet 200 milliGRAM(s) Oral daily        GI/NUTRITION  Exam: Wound vac in place.   Diet: NPO w/ sips/chips  Meds: famotidine    Tablet 20 milliGRAM(s) Oral daily      GENITOURINARY  I&O's Detail    01-31 @ 07:01  -  02-01 @ 07:00  --------------------------------------------------------  IN:    Oral Fluid: 840 mL  Total IN: 840 mL    OUT:    Bulb: 140 mL  Total OUT: 140 mL    Total NET: 700 mL      02-01 @ 07:01  -  02-02 @ 00:50  --------------------------------------------------------  IN:    Oral Fluid: 395 mL    Packed Red Blood Cells: 700 mL    Solution: 100 mL    Solution: 100 mL  Total IN: 1295 mL    OUT:    Bulb: 70 mL  Total OUT: 70 mL    Total NET: 1225 mL          02-01    132<L>  |  97  |  27<H>  ----------------------------<  116<H>  4.2   |  24  |  5.57<H>    Ca    7.8<L>      01 Feb 2020 23:28  Phos  4.4     02-01  Mg     1.8     02-01    TPro  4.2<L>  /  Alb  1.8<L>  /  TBili  0.5  /  DBili  0.3<H>  /  AST  15  /  ALT  5<L>  /  AlkPhos  199<H>  02-01    [ ] Kennedy catheter, indication: N/A  Meds: Nephro-candace 1 Tablet(s) Oral daily        HEMATOLOGIC  Meds: aspirin  chewable 81 milliGRAM(s) Oral daily    [x] VTE Prophylaxis                        5.7    18.46 )-----------( 451      ( 01 Feb 2020 23:28 )             17.1     PT/INR - ( 01 Feb 2020 23:28 )   PT: 20.0 sec;   INR: 1.73 ratio         PTT - ( 01 Feb 2020 23:28 )  PTT:36.5 sec  Transfusion     [ ] PRBC   [ ] Platelets   [ ] FFP   [ ] Cryoprecipitate      INFECTIOUS DISEASES  T(C): 36.6 (02-01-20 @ 23:00), Max: 37.7 (02-01-20 @ 15:00)  Wt(kg): --  WBC Count: 18.46 K/uL (02-01 @ 23:28)  WBC Count: 15.44 K/uL (02-01 @ 20:53)  WBC Count: 15.49 K/uL (02-01 @ 17:39)  WBC Count: 15.29 K/uL (02-01 @ 16:42)  WBC Count: 11.47 K/uL (02-01 @ 06:10)    Recent Cultures:    Meds: epoetin trey Injectable 00847 Unit(s) IV Push <User Schedule>  meropenem  IVPB 500 milliGRAM(s) IV Intermittent every 12 hours  micafungin IVPB 100 milliGRAM(s) IV Intermittent every 24 hours        ENDOCRINE  Capillary Blood Glucose    Meds: allopurinol 100 milliGRAM(s) Oral daily        OTHER MEDICATIONS:  brimonidine 0.2% Ophthalmic Solution 1 Drop(s) Both EYES three times a day  chlorhexidine 2% Cloths 1 Application(s) Topical <User Schedule>  latanoprost 0.005% Ophthalmic Solution 1 Drop(s) Both EYES daily      CODE STATUS:     IMAGING:

## 2020-02-02 NOTE — PROGRESS NOTE ADULT - ASSESSMENT
49 year old male with ESRD on HD, now s/p ddrt on 12/8/2019 course complicated by TMA/profound ATN, who presents from a HD center with a fever and cough. He was initially admitted with influenza. He is s/p washout of infected collection and biopsy which revealed ATN.  He was found to have diffusely enlarging perinephric fluid collection.    While on telemetry, he had an episode of a wide complex tachycardia. It initially appears irregular, suggesting an atrial arrhythmia with aberrancy, though the remainder appears more like an episode of non-sustained VT.   s/p washout on 1/1/2020 with worsening sepsis and transfer to ICU, s/p IR drainage of perinephric collection on 1/6. 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 He underwent operative repair of right external iliac artery with pericardial patch, transplant nephrectomy, RLE thrombectomy, and IVC filter on 1/9.     s/p corrina ->tachy arrest with tdp, af rvr, now on amio, with large PE    He has now returned to the SICU with significant lue swelling and active arterial extravasation, with drop in blood counts.    - remains in sr, despite this  - had developed malignant arrhythmias which seemed to start with bradycardia, leading to more tachycardia and bursts of VT and rapid AF.  The apparent af degenerated into torsades.  He was shocked and has been maintaining SR since. Arrhythmias were most likely triggered by the massive vte event, are unlikely to represent a primary cardiac issue  - cont amio at 200 daily.   - hold metoprolol  - repeat ekg daily to evaluate qtc, while on antifungals/amiodarone  - hold asa     - was on apixaban for his PE  - this has been held in the setting of significant arterial extravasation at his fistula.  - trend CBC q4-6hr. Transfuse to keep hb >8  - d/w team the idea of reversing his eliquis, though this is less than ideal in the setting of a recent pe causing cardiac arrest.  - echo with preserved/hyperdynamic lv, with RV failure consistent with acute RV overload  - Further cardiac workup will depend on clinical course.  - will follow with you

## 2020-02-02 NOTE — CHART NOTE - NSCHARTNOTEFT_GEN_A_CORE
I saw pt at 5pm, 11pm, and 1am. Pt had acute LUE pain and swelling after HD today with acute posthemorrhagic anemia. Pt complaining of severe LUE and axillary pain, better with dilaudid, no numbness/ tingling of the hand or forearm. Unable to palpate compartments of the upper arm, chest wall and back due to extensive hematoma. No active external bleeding. No signs of compartment syndrome.    CT-A obtained. I personally reviewed images which show likely active extravasation in the axillary hematoma, with large extensive hematoma in the subcutaneous tissues and intramuscular compartments (appears mostly subcutaneous), not near the area of the fistula. I discussed this with Radiology, who agreed it appears like active extravasation, although source is not clear.    Pt subsequently became markedly hypotensive, to SBP 70s. He was still alert and awake. Two units PRBC emergency release were ordered. Pt has R midline, cannot use LUE due to hematoma and bleeding, R iliac V was repaired recently, so L femoral Cordis was placed. Hct decreased to 17.    I discussed this with Vascular fellow, who noted that there is no target vessel in this area that could have been injured by HD needles, and that it is possible that the bleeding from the fistula is tracking up into the hematoma. Given that pt is on anticoagulation (last therapeutic dose of Eliquis given this morning; this evening's dose held, confirmed on EMAR) and the hematoma is so large, he feels strongly after discussion with his attending that exploration would likely create even more bleeding, and that the fistula would likely need to be ligated if exploration was done. Recommendation to continue transfusing and monitoring vital signs.    After 2 units PRBC, SBP increased to 90s. Will continue to monitor. Will hold pressure over fistula access sites. Continue to hold Eliquis. I considered attempting to reverse Eliquis, but given that patient very recently had a large PE, I feel the risk of this is greater than the benefit.     CC time: 65 minutes

## 2020-02-02 NOTE — PROGRESS NOTE ADULT - ATTENDING COMMENTS
Went over the sequence of event last night, s/p 2 units transfusion last night with improvement of hemodynamics, received another unit today sec to mild drop in HCT. Will continue to monitor  Pt has good peripheral pulses in LUE, CPK < 100, no evidence of rhabdomyolysis  Pain control  May change cordis for Shiley once HD access needed. does not need to be dialysed today  Vascular surgery consulted, no intervention plan so far  Pt on Apixaban for large PE, will restart if HCT remains stable  Discussed with transplant surgery

## 2020-02-02 NOTE — PROGRESS NOTE ADULT - ASSESSMENT
49M PMHx of ESRD on HD  ( via LUE AVF), HTN, Gout, Glaucoma, NET of pancreas, RA with hand contractures. s/p DDRT 12/8/19,  Post op course c/b DGF requiring HD. Renal bx x 2 c/w profound ATN.     Re-admitted with Influenza and perinephric hematoma. s/p hematoma evacuation/ abd washout and renal biopsy. Post op course c/b leukocytosis, infected hematoma, acute RLE DVT, and bleeding pseudoaneurysm at txp renal artery anastomosis. Now s/p graft nephrectomy. Course further complicated by saddle PE causing right heart strain and hemodynamic instability leading to cardiac arrest requiring ACLS, Required IR drainage of post-op collections x2. Transferred to floor on 1/25/2020.     [] DDRT c/b DGF/ATN/perinephric infected hematoma/pseudoneurysm/graft nephrectomy   - S/P AFV cannulation for HD yesterday with infiltration causing extravasation/bleeding to the left upper arm and chest noted on CTA  - S/P 3 units PRBC overnight for drop in H&H 5.7/17.1, post transfusion H&H 7.8/22.8  - H&H this am continue to drop to 7.1/21, added one more unit PRBC will f/u repeat H&H  - LT femoral cortis intact may need to change to [facility] for HD, HD held today per renal will reassess in am  - Eliquis placed on hold  - s/p bedside IR drainage of RLQ fluid collection (1/15), drain repositioned 1/22 and cultures NTD  - FU final IR culture, fungal culture from 1/22- NTD  - Repeat chest/abd/pelvis CT 1/28- noted with continued resolution of right lower quadrant fluid collection. Persistent bilateral lower lobe pulmonary emboli.  - ID following;  cont Meropenem, Micafungin 100mg daily.   ID plan for discharge on cipro.  - pain control  - bowel regimen  -on pepcid.   - PT/OT/OOB.  VAC change per PT  - Keep B/L LE w/ compression stockings. Has gout pain on R knee, avoid covering the R knee. Apply compression stocking on R side up to just below knee level and apply ACE bandage above the knee on the R side  up to mid/upper thigh.       [] Saddle PE, with right heart strain and complicated by PEA arrest followed by Torsade, ROSC achieved after ACLS  - vascular, CT and vascular cardiology following   -Repeat chest CT 1/28 noted Persistent bilateral lower lobe pulmonary emboli.   - CT 1/21: There are pulmonary emboli within the right lower lobar arteries and the left lower lobar arteries extending into the distal segments as seen on prior study. The pulmonary emboli seen in the right and left pulmonary arteries are normal longer visualized. New distal to mid brachial DVT noted  1/19 Duplex: RUE Deep vein thrombosis in the mid to distal brachial vein.  - Eliquis held since yesterday in the setting of AVF bleed   - continue ASA  - repeat ECHO with improved R heart strain    [] New Afib/Vtech  Cardiology following, BB low dose in the setting of WCT and hold BB on HD days  Continue amiodarone, card plan to continue for couple of months and will f/u as outpt    [] New acute DVT of R distal brachia vein   - continue to hold anticoagulation due to active LT AVF bleed    [] New acute DVT of R external iliac, common femoral, femoral veins, R soleal vein   - IVC filter placed 1/9, hold anticoagulation due to active afv bleed     [] Thrombocytosis  - PLT downtrending to 358 was rising PLT counts over the past week.   -Heme consulted; likely reactive. Heme work up in progress    [] Dispo  - continue tele  -discharge planning in progress d/c to acute rehab once medically stable.

## 2020-02-02 NOTE — PROGRESS NOTE ADULT - PROBLEM SELECTOR PLAN 1
Last HD treatment on 1/30/20, with 2L of UF tolerated. Follows with nephrologist, Dr Amaya.  Had dialysis yesterday with fistula infiltration/ hematoma and multiple transfusions.  Seen by vascular - no intervention at this time.  may need to switch dialysis access to [facility] temporarily.

## 2020-02-02 NOTE — PRE-ANESTHESIA EVALUATION ADULT - NSANTHPMHFT_GEN_ALL_CORE
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   Hospital course complicated by:  - Infected transplant perinephric hematoma s/p right groin exploration, washout, & biopsy c/b persistent perinephric collection s/p FNA  - Right external iliac & common femoral DVTs s/p IVC filter & thrombectomy of the RLE veins  - Gross hematuria w/ pseudoaneurysm of the right external iliac & transplant renal artery seen on IR angiogram s/p right groin exploration, washout of the RP space, and transplant nephrectomy w/ repair of the right external iliac artery w/ a bovine patch  - Iliac fossa collection s/p IR drainage  - Cardiac arrest secondary to a saddle PE.    Now with LUE hematoma and suspected bleeding after HD session today, not open surgical candidate per vascular surgery. Amenable for IR per team and consultants for fistulogram and possible intervention.

## 2020-02-02 NOTE — PROGRESS NOTE ADULT - ATTENDING COMMENTS
events noted  will transfuse as needed  follow up with vascular surgery  hold anticoagulation  no dialysis needed today

## 2020-02-02 NOTE — PROGRESS NOTE ADULT - SUBJECTIVE AND OBJECTIVE BOX
[facility] NEPHROLOGY SERVICES, [facility]  NEPHROLOGY AND HYPERTENSION  [address]  [address]  [place] [address]  [phone]    MD GIANA GUERRERO, MD JANAE MCDONNELL MD YELENA ROSENBERG, MD SALUD GARZA, MD CATRINA WOO MD          Patient in bed sleeping  No distress    MEDICATIONS  (STANDING):  allopurinol 100 milliGRAM(s) Oral daily  aMIOdarone    Tablet 200 milliGRAM(s) Oral daily  brimonidine 0.2% Ophthalmic Solution 1 Drop(s) Both EYES three times a day  chlorhexidine 2% Cloths 1 Application(s) Topical <User Schedule>  chlorhexidine 4% Liquid 1 Application(s) Topical <User Schedule>  epoetin trey Injectable 32210 Unit(s) IV Push <User Schedule>  famotidine    Tablet 20 milliGRAM(s) Oral daily  gabapentin 100 milliGRAM(s) Oral every 12 hours  latanoprost 0.005% Ophthalmic Solution 1 Drop(s) Both EYES daily  meropenem  IVPB 500 milliGRAM(s) IV Intermittent every 12 hours  micafungin IVPB 100 milliGRAM(s) IV Intermittent every 24 hours  Nephro-candace 1 Tablet(s) Oral daily    MEDICATIONS  (PRN):  acetaminophen   Tablet .. 975 milliGRAM(s) Oral every 6 hours PRN Mild Pain (1 - 3)  HYDROmorphone  Injectable 0.5 milliGRAM(s) IV Push every 2 hours PRN breakthrough pain  oxyCODONE    IR 10 milliGRAM(s) Oral every 6 hours PRN Severe Pain (7 - 10)  oxyCODONE    IR 5 milliGRAM(s) Oral every 4 hours PRN Moderate Pain (4 - 6)      02-01-20 @ 07:01  -  02-02-20 @ 07:00  --------------------------------------------------------  IN: 1695 mL / OUT: 120 mL / NET: 1575 mL    02-02-20 @ 07:01 - 02-02-20 @ 16:06  --------------------------------------------------------  IN: 300 mL / OUT: 0 mL / NET: 300 mL      PHYSICAL EXAM:      T(C): 36.4 (02-02-20 @ 11:00), Max: 36.9 (02-01-20 @ 19:00)  HR: 70 (02-02-20 @ 15:00) (56 - 73)  BP: 145/67 (02-02-20 @ 15:00) (81/45 - 157/81)  RR: 16 (02-02-20 @ 15:00) (7 - 22)  SpO2: 100% (02-02-20 @ 15:00) (98% - 100%)  Wt(kg): --  Respiratory: clear anteriorly, decreased BS at bases  Cardiovascular: S1 S2  Gastrointestinal: soft NT ND +BS  Extremities:  LUE wrapped ; 1 edema                                    8.2    13.96 )-----------( 362      ( 02 Feb 2020 12:16 )             23.8     02-02    135  |  99  |  28<H>  ----------------------------<  110<H>  4.6   |  24  |  5.67<H>    Ca    7.7<L>      02 Feb 2020 03:50  Phos  4.9     02-02  Mg     1.9     02-02    TPro  4.2<L>  /  Alb  1.6<L>  /  TBili  0.7  /  DBili  0.4<H>  /  AST  16  /  ALT  6<L>  /  AlkPhos  184<H>  02-02      LIVER FUNCTIONS - ( 02 Feb 2020 03:50 )  Alb: 1.6 g/dL / Pro: 4.2 g/dL / ALK PHOS: 184 U/L / ALT: 6 U/L / AST: 16 U/L / GGT: x           Creatinine Trend: 5.67<--, 5.57<--, 5.43<--, 4.91<--, 3.88<--, 5.23<--    Assessment     AVF infiltration; extending to chest; on Eliquis;       Hx ESRD on HD, s/p DDRT 12/8/19, DGF  S/p transplant kidney bx 12/13: ATN, focal TMA  S/p PLEX 12/13, 12/15; d/c-d 12/20 w/op HD 3 x wk  Re-adm 12/27 w/Flu A, infected carlos-nephric hematoma   S/p OR 1/1 for washout and repeat renal graft biopsy (c/w ATN)  Dx w/RLE DVT  Developed gross hematuria  S/p OR 1/10 for infected carlos-nephric hematoma, hemorrhage involving pseudo-aneurysm of anastomosis of renal artery to external iliac vein,   s/p transplant nephrectomy, s/p IVCF   S/p PEA arrest 1/13, dx w/PE, R retroperitoneal collection  S/p IR drainage of abd collection  RUE DVT  Abx per ID  HD TTS  TMP as able today;   Epogen w/HD 3 x week  PT      Plan:  Holding HD for now;   Follow K and volume status;    Will likely require [place] cath for HD tomorrow.      Fam Esteban MD

## 2020-02-03 LAB
ALBUMIN SERPL ELPH-MCNC: 2 G/DL — LOW (ref 3.3–5)
ALP SERPL-CCNC: 166 U/L — HIGH (ref 40–120)
ALT FLD-CCNC: 5 U/L — LOW (ref 10–45)
ANION GAP SERPL CALC-SCNC: 14 MMOL/L — SIGNIFICANT CHANGE UP (ref 5–17)
APTT BLD: 35 SEC — SIGNIFICANT CHANGE UP (ref 27.5–36.3)
AST SERPL-CCNC: 16 U/L — SIGNIFICANT CHANGE UP (ref 10–40)
BILIRUB DIRECT SERPL-MCNC: 0.3 MG/DL — HIGH (ref 0–0.2)
BILIRUB INDIRECT FLD-MCNC: 0.2 MG/DL — SIGNIFICANT CHANGE UP (ref 0.2–1)
BILIRUB SERPL-MCNC: 0.5 MG/DL — SIGNIFICANT CHANGE UP (ref 0.2–1.2)
BUN SERPL-MCNC: 32 MG/DL — HIGH (ref 7–23)
CALCIUM SERPL-MCNC: 7.8 MG/DL — LOW (ref 8.4–10.5)
CHLORIDE SERPL-SCNC: 95 MMOL/L — LOW (ref 96–108)
CO2 SERPL-SCNC: 22 MMOL/L — SIGNIFICANT CHANGE UP (ref 22–31)
CREAT SERPL-MCNC: 6.36 MG/DL — HIGH (ref 0.5–1.3)
GLUCOSE SERPL-MCNC: 110 MG/DL — HIGH (ref 70–99)
HCT VFR BLD CALC: 21.8 % — LOW (ref 39–50)
HCT VFR BLD CALC: 25.2 % — LOW (ref 39–50)
HCT VFR BLD CALC: 25.3 % — LOW (ref 39–50)
HCT VFR BLD CALC: 25.9 % — LOW (ref 39–50)
HGB BLD-MCNC: 7.3 G/DL — LOW (ref 13–17)
HGB BLD-MCNC: 8.5 G/DL — LOW (ref 13–17)
HGB BLD-MCNC: 8.6 G/DL — LOW (ref 13–17)
HGB BLD-MCNC: 9 G/DL — LOW (ref 13–17)
INR BLD: 1.28 RATIO — HIGH (ref 0.88–1.16)
MAGNESIUM SERPL-MCNC: 2.2 MG/DL — SIGNIFICANT CHANGE UP (ref 1.6–2.6)
MCHC RBC-ENTMCNC: 29.7 PG — SIGNIFICANT CHANGE UP (ref 27–34)
MCHC RBC-ENTMCNC: 30.1 PG — SIGNIFICANT CHANGE UP (ref 27–34)
MCHC RBC-ENTMCNC: 30.6 PG — SIGNIFICANT CHANGE UP (ref 27–34)
MCHC RBC-ENTMCNC: 30.8 PG — SIGNIFICANT CHANGE UP (ref 27–34)
MCHC RBC-ENTMCNC: 33.5 GM/DL — SIGNIFICANT CHANGE UP (ref 32–36)
MCHC RBC-ENTMCNC: 33.7 GM/DL — SIGNIFICANT CHANGE UP (ref 32–36)
MCHC RBC-ENTMCNC: 34 GM/DL — SIGNIFICANT CHANGE UP (ref 32–36)
MCHC RBC-ENTMCNC: 34.7 GM/DL — SIGNIFICANT CHANGE UP (ref 32–36)
MCV RBC AUTO: 88.6 FL — SIGNIFICANT CHANGE UP (ref 80–100)
MCV RBC AUTO: 88.7 FL — SIGNIFICANT CHANGE UP (ref 80–100)
MCV RBC AUTO: 89.4 FL — SIGNIFICANT CHANGE UP (ref 80–100)
MCV RBC AUTO: 90 FL — SIGNIFICANT CHANGE UP (ref 80–100)
NRBC # BLD: 0 /100 WBCS — SIGNIFICANT CHANGE UP (ref 0–0)
PHOSPHATE SERPL-MCNC: 5.8 MG/DL — HIGH (ref 2.5–4.5)
PLATELET # BLD AUTO: 389 K/UL — SIGNIFICANT CHANGE UP (ref 150–400)
PLATELET # BLD AUTO: 394 K/UL — SIGNIFICANT CHANGE UP (ref 150–400)
PLATELET # BLD AUTO: 404 K/UL — HIGH (ref 150–400)
PLATELET # BLD AUTO: 410 K/UL — HIGH (ref 150–400)
POTASSIUM SERPL-MCNC: 4.8 MMOL/L — SIGNIFICANT CHANGE UP (ref 3.5–5.3)
POTASSIUM SERPL-SCNC: 4.8 MMOL/L — SIGNIFICANT CHANGE UP (ref 3.5–5.3)
PROCALCITONIN SERPL-MCNC: 0.98 NG/ML — HIGH (ref 0.02–0.1)
PROT SERPL-MCNC: 4.5 G/DL — LOW (ref 6–8.3)
PROTHROM AB SERPL-ACNC: 14.8 SEC — HIGH (ref 10–12.9)
RBC # BLD: 2.46 M/UL — LOW (ref 4.2–5.8)
RBC # BLD: 2.81 M/UL — LOW (ref 4.2–5.8)
RBC # BLD: 2.82 M/UL — LOW (ref 4.2–5.8)
RBC # BLD: 2.92 M/UL — LOW (ref 4.2–5.8)
RBC # FLD: 15.5 % — HIGH (ref 10.3–14.5)
RBC # FLD: 15.6 % — HIGH (ref 10.3–14.5)
RBC # FLD: 15.7 % — HIGH (ref 10.3–14.5)
RBC # FLD: 15.9 % — HIGH (ref 10.3–14.5)
SODIUM SERPL-SCNC: 131 MMOL/L — LOW (ref 135–145)
WBC # BLD: 17.83 K/UL — HIGH (ref 3.8–10.5)
WBC # BLD: 17.96 K/UL — HIGH (ref 3.8–10.5)
WBC # BLD: 18.67 K/UL — HIGH (ref 3.8–10.5)
WBC # BLD: 19.11 K/UL — HIGH (ref 3.8–10.5)
WBC # FLD AUTO: 17.83 K/UL — HIGH (ref 3.8–10.5)
WBC # FLD AUTO: 17.96 K/UL — HIGH (ref 3.8–10.5)
WBC # FLD AUTO: 18.67 K/UL — HIGH (ref 3.8–10.5)
WBC # FLD AUTO: 19.11 K/UL — HIGH (ref 3.8–10.5)

## 2020-02-03 PROCEDURE — 76937 US GUIDE VASCULAR ACCESS: CPT | Mod: 26

## 2020-02-03 PROCEDURE — 93308 TTE F-UP OR LMTD: CPT | Mod: 26

## 2020-02-03 PROCEDURE — 99232 SBSQ HOSP IP/OBS MODERATE 35: CPT | Mod: GC,24

## 2020-02-03 PROCEDURE — 99291 CRITICAL CARE FIRST HOUR: CPT

## 2020-02-03 PROCEDURE — 99232 SBSQ HOSP IP/OBS MODERATE 35: CPT

## 2020-02-03 PROCEDURE — 99233 SBSQ HOSP IP/OBS HIGH 50: CPT

## 2020-02-03 PROCEDURE — 99232 SBSQ HOSP IP/OBS MODERATE 35: CPT | Mod: GC

## 2020-02-03 PROCEDURE — 93321 DOPPLER ECHO F-UP/LMTD STD: CPT | Mod: 26

## 2020-02-03 PROCEDURE — 36217 PLACE CATHETER IN ARTERY: CPT

## 2020-02-03 PROCEDURE — 75710 ARTERY X-RAYS ARM/LEG: CPT | Mod: 26,LT

## 2020-02-03 RX ORDER — POLYETHYLENE GLYCOL 3350 17 G/17G
17 POWDER, FOR SOLUTION ORAL DAILY
Refills: 0 | Status: DISCONTINUED | OUTPATIENT
Start: 2020-02-03 | End: 2020-02-14

## 2020-02-03 RX ORDER — CALCIUM GLUCONATE 100 MG/ML
1 VIAL (ML) INTRAVENOUS ONCE
Refills: 0 | Status: COMPLETED | OUTPATIENT
Start: 2020-02-03 | End: 2020-02-03

## 2020-02-03 RX ORDER — SENNA PLUS 8.6 MG/1
2 TABLET ORAL AT BEDTIME
Refills: 0 | Status: DISCONTINUED | OUTPATIENT
Start: 2020-02-03 | End: 2020-02-14

## 2020-02-03 RX ADMIN — CHLORHEXIDINE GLUCONATE 1 APPLICATION(S): 213 SOLUTION TOPICAL at 06:00

## 2020-02-03 RX ADMIN — HYDROMORPHONE HYDROCHLORIDE 0.5 MILLIGRAM(S): 2 INJECTION INTRAMUSCULAR; INTRAVENOUS; SUBCUTANEOUS at 08:56

## 2020-02-03 RX ADMIN — OXYCODONE HYDROCHLORIDE 10 MILLIGRAM(S): 5 TABLET ORAL at 15:37

## 2020-02-03 RX ADMIN — Medication 100 MILLIGRAM(S): at 11:41

## 2020-02-03 RX ADMIN — Medication 1 TABLET(S): at 11:41

## 2020-02-03 RX ADMIN — OXYCODONE HYDROCHLORIDE 5 MILLIGRAM(S): 5 TABLET ORAL at 19:51

## 2020-02-03 RX ADMIN — FAMOTIDINE 20 MILLIGRAM(S): 10 INJECTION INTRAVENOUS at 11:41

## 2020-02-03 RX ADMIN — AMIODARONE HYDROCHLORIDE 200 MILLIGRAM(S): 400 TABLET ORAL at 06:00

## 2020-02-03 RX ADMIN — BRIMONIDINE TARTRATE 1 DROP(S): 2 SOLUTION/ DROPS OPHTHALMIC at 06:00

## 2020-02-03 RX ADMIN — HYDROMORPHONE HYDROCHLORIDE 0.5 MILLIGRAM(S): 2 INJECTION INTRAMUSCULAR; INTRAVENOUS; SUBCUTANEOUS at 11:12

## 2020-02-03 RX ADMIN — BRIMONIDINE TARTRATE 1 DROP(S): 2 SOLUTION/ DROPS OPHTHALMIC at 22:17

## 2020-02-03 RX ADMIN — MEROPENEM 100 MILLIGRAM(S): 1 INJECTION INTRAVENOUS at 15:58

## 2020-02-03 RX ADMIN — OXYCODONE HYDROCHLORIDE 5 MILLIGRAM(S): 5 TABLET ORAL at 20:20

## 2020-02-03 RX ADMIN — GABAPENTIN 100 MILLIGRAM(S): 400 CAPSULE ORAL at 06:00

## 2020-02-03 RX ADMIN — Medication 100 GRAM(S): at 04:36

## 2020-02-03 RX ADMIN — OXYCODONE HYDROCHLORIDE 10 MILLIGRAM(S): 5 TABLET ORAL at 14:37

## 2020-02-03 RX ADMIN — GABAPENTIN 100 MILLIGRAM(S): 400 CAPSULE ORAL at 17:19

## 2020-02-03 RX ADMIN — MICAFUNGIN SODIUM 105 MILLIGRAM(S): 100 INJECTION, POWDER, LYOPHILIZED, FOR SOLUTION INTRAVENOUS at 14:27

## 2020-02-03 RX ADMIN — MEROPENEM 100 MILLIGRAM(S): 1 INJECTION INTRAVENOUS at 05:00

## 2020-02-03 NOTE — PROGRESS NOTE ADULT - SUBJECTIVE AND OBJECTIVE BOX
Follow Up:  infected hematoma    Interval History: over weekend developed hematoma at LUE fistula site with active extravasation. planned for OR today. Afebrile.     REVIEW OF SYSTEMS  [  ] ROS unobtainable because:    [ x ] All other systems negative except as noted below    Constitutional:  [ ] fever [ ] chills  [ ] weight loss  [ ] weakness  Skin:  [ ] rash [ ] phlebitis	  Eyes: [ ] icterus [ ] pain  [ ] discharge	  ENMT: [ ] sore throat  [ ] thrush [ ] ulcers [ ] exudates  Respiratory: [ ] dyspnea [ ] hemoptysis [ ] cough [ ] sputum	  Cardiovascular:  [ ] chest pain [ ] palpitations [ ] edema	  Gastrointestinal:  [ ] nausea [ ] vomiting [ ] diarrhea [ ] constipation [ x] pain	  Genitourinary:  [ ] dysuria [ ] frequency [ ] hematuria [ ] discharge [ ] flank pain  [ ] incontinence  Musculoskeletal:  [ ] myalgias [ ] arthralgias [ ] arthritis  [ ] back pain +LUE edema and pain  Neurological:  [ ] headache [ ] seizures  [ ] confusion/altered mental status    Allergies  coconut (Anaphylaxis)  morphine (Pruritus; Rash)        ANTIMICROBIALS:  meropenem  IVPB 500 every 12 hours  micafungin IVPB 100 every 24 hours      OTHER MEDS:  MEDICATIONS  (STANDING):  acetaminophen   Tablet .. 975 every 6 hours PRN  allopurinol 100 daily  aMIOdarone    Tablet 200 daily  epoetin trey Injectable 08548 <User Schedule>  famotidine    Tablet 20 daily  gabapentin 100 every 12 hours  HYDROmorphone  Injectable 0.5 every 2 hours PRN  oxyCODONE    IR 10 every 6 hours PRN  oxyCODONE    IR 5 every 4 hours PRN      Vital Signs Last 24 Hrs  T(C): 36.7 (03 Feb 2020 15:00), Max: 37.2 (03 Feb 2020 07:00)  T(F): 98.1 (03 Feb 2020 15:00), Max: 99 (03 Feb 2020 07:00)  HR: 80 (03 Feb 2020 18:00) (69 - 80)  BP: 163/80 (03 Feb 2020 18:00) (111/63 - 163/80)  BP(mean): 112 (03 Feb 2020 18:00) (81 - 115)  RR: 17 (03 Feb 2020 18:00) (0 - 20)  SpO2: 100% (03 Feb 2020 18:00) (97% - 100%)    PHYSICAL EXAMINATION:  General: Alert and Awake, NAD  HEENT: PERRL, EOMI  Neck: Supple  Cardiac: RRR, No M/R/G  Resp: CTAB, No Wh/Rh/Ra  Abdomen: +RLQ drain with serosanguinous drainage. +RLQ wound vac over transplant nephrectomy site, NBS, mild tenderness to palpation over RLQ  MSK: +LUE edema and pain at proximal arm. 1+ RLE edema. No Calf tenderness  Skin: No rashes or lesions. Skin is warm and dry to the touch.   Neuro: Alert and Awake. CN 2-12 Grossly intact. Moves all four extremities spontaneously.  Psych: Calm, Pleasant, Cooperative                        8.6    18.67 )-----------( 410      ( 03 Feb 2020 15:31 )             25.3       02-03    131<L>  |  95<L>  |  32<H>  ----------------------------<  110<H>  4.8   |  22  |  6.36<H>    Ca    7.8<L>      03 Feb 2020 02:58  Phos  5.8     02-03  Mg     2.2     02-03    TPro  4.5<L>  /  Alb  2.0<L>  /  TBili  0.5  /  DBili  0.3<H>  /  AST  16  /  ALT  5<L>  /  AlkPhos  166<H>  02-03          MICROBIOLOGY:  v  .Blood Blood  01-24-20   No growth at 5 days.  --  --      .Body Fluid RLQ Fluid Collection  01-24-20   No growth  --  --      .Blood Blood  01-24-20   No growth at 5 days.  --  --      .Body Fluid Abdominal Fluid  01-22-20   No growth at 5 days  --    polymorphonuclear leukocytes seen  Gram Negative Rods seen  by cytocentrifuge      .Blood Blood  01-21-20   No growth at 5 days.  --  --      .Blood Blood  01-21-20   No growth at 5 days.  --  --      .Sputum Sputum, trap  01-18-20   Normal Respiratory Gillian present  --    No polymorphonuclear leukocytes per low power field  Rare Squamous epithelial cells per low power field  Rare Gram positive cocci in pairs per oil power field      .Blood Blood  01-16-20   No growth at 5 days.  --  --      .Body Fluid Abdominal Fluid  01-15-20   No growth at 5 days  --    polymorphonuclear leukocytes seen  No organisms seen  by cytocentrifuge      .Tissue Other  01-10-20   No growth at 5 days  --  Enterobacter cloacae (Carbapenem Resistant)      .Blood Blood-Venous  01-07-20   No growth at 5 days.  --  --      .Urine Catheterized  01-07-20   <10,000 CFU/mL Normal Urogenital Gillian  --  --      Abdominal Fl Abdominal Fluid  01-06-20   No growth at 5 days  --    No polymorphonuclear leukocytes seen  No organisms seen  by cytocentrifuge      .Urine Clean Catch (Midstream)  01-05-20   >100,000 CFU/ml Enterobacter cloacae  --  Enterobacter cloacae      RADIOLOGY:    <The imaging below has been reviewed and visualized by me independently. Findings as detailed in report below>    EXAM:  CT ANGIO UPR EXT (W)AW IC LT                EXAM:  CT ANGIO CHEST (W)AW IC                      PROCEDURE DATE:  02/01/2020    Large left upper extremity hematoma measuring 17.3 x 5.0 cm which extends into the left axilla with active arterial extravasation which persists on more delayed imaging.

## 2020-02-03 NOTE — PROGRESS NOTE ADULT - ASSESSMENT
49 year old male PMH ESRD on HD ( via LUE AVF) s/p DDRT 12/8/19 (CMV -/+, EBV -/+), NET of pancreas (s/p robotic distal panc/splenectomy 2015), RA with hand contractures who presented to [facility] on 12/27 with cough and fever. RVP with Influenza A s/p Tamiflu treatment course. Now with infected carlos-transplant hematoma.    1/1: s/p Ex-Lap and washout of infected hematoma (Enterobacter from cultures)  1/6: s/p IR guided drainage attempt of hematoma on 1/6 (NGTD from cultures)  1/10: s/p transplant nephrectomy, repair of right external iliac artery with a bovine pericardial patch, repair of right external iliac vein and thrombectomy of right lower extremity veins (Surgical cultures with Enterobacter)  1/15: s/p IR guided drainage (220cc of hematoma)  1/22: s/p IR drain repositioning and upsizing (Gram stain with GNR but so far NGTD)    PEA arrest and Torsades on 1/13  CT C/A/P (1/13) with Saddle PE, interval increase in size of RP fluid collection (infected hematoma)  CT C/A/P (1/21) with pulmonary infarcts and residual hematoma (decreased in size)    Some of the Enterobacter isolates R and S to Ertapenem  per core lab Ertapenem at borderline of Susceptible and Resistant breakpoint  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   Given concern for intravascular infection I would treat for 6 weeks from 1/10 nephrectomy (End Date: 2/20/20)  I would continue Meropenem while admitted and switch to Ciprofloxacin on discharge    Infiltrated LUE AVF with hematoma formation and active extravasation    Overall, LUE Hematoma, Leukocytosis (stable), Fever (resolving), Infected Hematoma, Saddle pulmonary embolus, Positive Urine Culture, Renal Transplant Recipient    --Decrease Meropenem to 500 mg IV Q24H (anticipate switch to Cipro PO on discharge - End Date: 2/20/20)  --Would consider discontinuing Micafungin - no evidence of yeast in prior cultures.   --Continue to follow CBC with diff  --Continue to follow temperature curve  --Management of Saddle PE per Transplant and vascular teams  --Tentatively planned for OR today with Vascular surgery for management of hematoma    I will continue to follow. Please feel free to contact me with any further questions.    Dada Jacobo M.D.  [facility] Division of Infectious Disease  8AM-5PM: Pager Number [phone]  After Hours (or if no response): Please contact the Infectious Diseases Office at [phone]     The above assessment and plan were discussed with transplant NP

## 2020-02-03 NOTE — PROGRESS NOTE ADULT - ASSESSMENT
Sai Prado is a 48 y/o male presenting with influenza A with a hospital course complicated by:  - Infected transplant perinephric hematoma s/p right groin exploration, washout, & biopsy c/b persistent perinephric collection s/p FNA  - Right external iliac & common femoral DVTs s/p IVC filter & thrombectomy of the RLE veins  - Gross hematuria w/ pseudoaneurysm of the right external iliac & transplant renal artery seen on IR angiogram s/p right groin exploration, washout of the RP space, and transplant nephrectomy w/ repair of the right external iliac artery w/ a bovine patch  - Iliac fossa collection s/p IR drainage  - Cardiac arrest secondary to a saddle PE    PLAN:  Neuro: No active issues.   - Pain control Tylenol, oxycodone, gabapentin, dilaudid   - UE reported as spasms in L posterior axilla; denies LUE pain; Forearm soft; and to move wrist / fingers    Resp: saddle PE with pulmonary infarct sequela. Largely resolved on most recent imaging  - No active issues.   - No further anticoagulation per Interventional Radiology recommendations    CV: obstructive shock secondary to PE (resolved), s/p PEA arrest with progression to torsades  - Monitor vital signs  - Continue amiodarone prior Torsades  - ASA 81 qD  - Continue metoprolol 12.5 BID.   - On ASA 81.     GI: no acute issues  - Renal diet as tolerated.  - Continue pepcid.   - Bowel regimen with senna & Miralax.  - Continue wound vac.     Renal: CKD stage V s/p DDRT c/b DGF & perinephric collection s/p right groin exploration, washout, & biopsy c/b persistent perinephric collection s/p FNA; gross hematuria w/ pseudoaneurysm of the right external iliac & transplant renal artery s/p right groin exploration, washout of the RP space, & transplant nephrectomy w/ repair of the right external iliac artery w/ a bovine patch; persistent right iliac fossa collection s/p IR drainage  - Unable to receive dialysis today, will hold off on alternate dialysis access, Transplant team with no urgent needs for dialysis at this time.   - On nephro-candace.     Heme: anemia of chronic diseases, saddle PE, right external iliac & common femoral DVTs s/p IVC filter  - S/p LUE arteriography with no bleeding vessel identified. Will continue to monitor CBCs.   - Holding Eliquis for now   - Continue Epogen.     ID: Enterobacter cloacae UTI, transplant perinephric collection and right iliac fossa collection with carbapenem resistant Enterobacter cloacae (CRE) and E. coli.  - On 6 week course of Meropenem, micafungin per Transplant, with eventual plans to switch to Per Os Ciprofloxacin on discharge.     Endo: No acute issues  - Monitor glucose on BMP    Misc: Gout, glaucoma  - Home allopurinol for gout  - Home eyedrops (latanoprost, dorzolamide, & brimonidine) for glaucoma    Disposition:  - Surgical Intensive Care Unit

## 2020-02-03 NOTE — PROGRESS NOTE ADULT - SUBJECTIVE AND OBJECTIVE BOX
INTERVAL HPI/OVERNIGHT EVENTS:  Patient S&E at bedside. No o/n events,     VITAL SIGNS:  T(F): 97.7 (02-03-20 @ 11:00)  HR: 72 (02-03-20 @ 12:00)  BP: 133/68 (02-03-20 @ 12:00)  RR: 13 (02-03-20 @ 12:00)  SpO2: 100% (02-03-20 @ 12:00)  Wt(kg): --    PHYSICAL EXAM:    Constitutional: NAD  Eyes: EOMI, sclera non-icteric  Neck: supple, no masses, no JVD  Respiratory: CTA b/l, good air entry b/l  Cardiovascular: RRR, no M/R/G  Gastrointestinal: soft, NTND, no masses palpable, + BS, no hepatosplenomegaly  Extremities: no c/c/e  Neurological: AAOx3      MEDICATIONS  (STANDING):  allopurinol 100 milliGRAM(s) Oral daily  aMIOdarone    Tablet 200 milliGRAM(s) Oral daily  brimonidine 0.2% Ophthalmic Solution 1 Drop(s) Both EYES three times a day  chlorhexidine 2% Cloths 1 Application(s) Topical <User Schedule>  chlorhexidine 4% Liquid 1 Application(s) Topical <User Schedule>  epoetin trey Injectable 68756 Unit(s) IV Push <User Schedule>  famotidine    Tablet 20 milliGRAM(s) Oral daily  gabapentin 100 milliGRAM(s) Oral every 12 hours  latanoprost 0.005% Ophthalmic Solution 1 Drop(s) Both EYES daily  meropenem  IVPB 500 milliGRAM(s) IV Intermittent every 12 hours  micafungin IVPB 100 milliGRAM(s) IV Intermittent every 24 hours  Nephro-candace 1 Tablet(s) Oral daily    MEDICATIONS  (PRN):  acetaminophen   Tablet .. 975 milliGRAM(s) Oral every 6 hours PRN Mild Pain (1 - 3)  HYDROmorphone  Injectable 0.5 milliGRAM(s) IV Push every 2 hours PRN breakthrough pain  oxyCODONE    IR 10 milliGRAM(s) Oral every 6 hours PRN Severe Pain (7 - 10)  oxyCODONE    IR 5 milliGRAM(s) Oral every 4 hours PRN Moderate Pain (4 - 6)      Allergies    coconut (Anaphylaxis)  morphine (Pruritus; Rash)    Intolerances        LABS:                        8.5    19.11 )-----------( 404      ( 03 Feb 2020 11:16 )             25.2     02-03    131<L>  |  95<L>  |  32<H>  ----------------------------<  110<H>  4.8   |  22  |  6.36<H>    Ca    7.8<L>      03 Feb 2020 02:58  Phos  5.8     02-03  Mg     2.2     02-03    TPro  4.5<L>  /  Alb  2.0<L>  /  TBili  0.5  /  DBili  0.3<H>  /  AST  16  /  ALT  5<L>  /  AlkPhos  166<H>  02-03    PT/INR - ( 03 Feb 2020 02:58 )   PT: 14.8 sec;   INR: 1.28 ratio         PTT - ( 03 Feb 2020 02:58 )  PTT:35.0 sec      RADIOLOGY & ADDITIONAL TESTS:  Studies reviewed.    < from: CT Angio Chest w/ IV Cont (02.01.20 @ 22:32) >  FINDINGS:    LUNGS AND AIRWAYS: Patent central airways.  Bibasilar atelectasis.    PLEURA: Small bilateral pleural effusion.    MEDIASTINUM AND EDUIN: No lymphadenopathy.    VESSELS: Redemonstrated bilateral subsegmental lower lobe pulmonary emboli. Atherosclerotic calcifications of the aorta and coronary arteries.    HEART: Heart size is normal. No pericardial effusion.    CHEST WALL AND LOWER NECK: Left thyroid lobe calcification.    VISUALIZED UPPER ABDOMEN: Anasarca. IVC filter. Cholelithiasis. Status post distal pancreatectomy.    BONES: Left hip ORIF. Unchanged sclerotic focus in the right iliac bone. Healed posterior left rib fractures. Right lower quadrant fluid collection atthe site of transplant nephrectomy with indwelling drainage catheter, now measuring 5.2 x 1.4 cm, decreased from prior, previously measuring 5.5 x 2.4 cm.    LEFT UPPER EXTREMITY:     Patient status post radial-cephalic AV fistula.    Large left upper extremity hematoma measuring 17.3 x 5.0 cm which extends into the left axilla with active arterial extravasation which persists on more delayed imaging (10, 250). Significant adjacent soft tissue infiltration.    Additional hematoma in the left anterior chest wall measuring 8 x 4.7 cm.    The outflow vein is patent however has aneurysmal dilatation measuring up to 1.2 cm.      IMPRESSION:     Large left upper extremity hematoma measuring 17.3 x 5.0 cm which extends into the left axilla with active arterial extravasation which persists on more delayed imaging.    These findings were discussed with Dr. May at 2/1/2020 10:58 PM by Dr. Hernandez with read back.    < end of copied text > INTERVAL HPI/OVERNIGHT EVENTS:  Patient S&E at bedside. No o/n events,     VITAL SIGNS:  T(F): 97.7 (02-03-20 @ 11:00)  HR: 72 (02-03-20 @ 12:00)  BP: 133/68 (02-03-20 @ 12:00)  RR: 13 (02-03-20 @ 12:00)  SpO2: 100% (02-03-20 @ 12:00)  Wt(kg): --    PHYSICAL EXAM:    Constitutional: NAD Tired appearing; blankets pulled up to face  Eyes: EOMI, sclera non-icteric  Neck: supple, no masses, no JVD  Respiratory: CTA b/l, good air entry b/l  Cardiovascular: RRR, no M/R/G  Gastrointestinal: soft, NTND, no masses palpable, + BS, no hepatosplenomegaly  Extremities: no c/c/e  Neurological: AAOx3      MEDICATIONS  (STANDING):  allopurinol 100 milliGRAM(s) Oral daily  aMIOdarone    Tablet 200 milliGRAM(s) Oral daily  brimonidine 0.2% Ophthalmic Solution 1 Drop(s) Both EYES three times a day  chlorhexidine 2% Cloths 1 Application(s) Topical <User Schedule>  chlorhexidine 4% Liquid 1 Application(s) Topical <User Schedule>  epoetin trey Injectable 74182 Unit(s) IV Push <User Schedule>  famotidine    Tablet 20 milliGRAM(s) Oral daily  gabapentin 100 milliGRAM(s) Oral every 12 hours  latanoprost 0.005% Ophthalmic Solution 1 Drop(s) Both EYES daily  meropenem  IVPB 500 milliGRAM(s) IV Intermittent every 12 hours  micafungin IVPB 100 milliGRAM(s) IV Intermittent every 24 hours  Nephro-candace 1 Tablet(s) Oral daily    MEDICATIONS  (PRN):  acetaminophen   Tablet .. 975 milliGRAM(s) Oral every 6 hours PRN Mild Pain (1 - 3)  HYDROmorphone  Injectable 0.5 milliGRAM(s) IV Push every 2 hours PRN breakthrough pain  oxyCODONE    IR 10 milliGRAM(s) Oral every 6 hours PRN Severe Pain (7 - 10)  oxyCODONE    IR 5 milliGRAM(s) Oral every 4 hours PRN Moderate Pain (4 - 6)      Allergies    coconut (Anaphylaxis)  morphine (Pruritus; Rash)    Intolerances        LABS:                        8.5    19.11 )-----------( 404      ( 03 Feb 2020 11:16 )             25.2     02-03    131<L>  |  95<L>  |  32<H>  ----------------------------<  110<H>  4.8   |  22  |  6.36<H>    Ca    7.8<L>      03 Feb 2020 02:58  Phos  5.8     02-03  Mg     2.2     02-03    TPro  4.5<L>  /  Alb  2.0<L>  /  TBili  0.5  /  DBili  0.3<H>  /  AST  16  /  ALT  5<L>  /  AlkPhos  166<H>  02-03    PT/INR - ( 03 Feb 2020 02:58 )   PT: 14.8 sec;   INR: 1.28 ratio         PTT - ( 03 Feb 2020 02:58 )  PTT:35.0 sec      RADIOLOGY & ADDITIONAL TESTS:  Studies reviewed.    < from: CT Angio Chest w/ IV Cont (02.01.20 @ 22:32) >  FINDINGS:    LUNGS AND AIRWAYS: Patent central airways.  Bibasilar atelectasis.    PLEURA: Small bilateral pleural effusion.    MEDIASTINUM AND EDUIN: No lymphadenopathy.    VESSELS: Redemonstrated bilateral subsegmental lower lobe pulmonary emboli. Atherosclerotic calcifications of the aorta and coronary arteries.    HEART: Heart size is normal. No pericardial effusion.    CHEST WALL AND LOWER NECK: Left thyroid lobe calcification.    VISUALIZED UPPER ABDOMEN: Anasarca. IVC filter. Cholelithiasis. Status post distal pancreatectomy.    BONES: Left hip ORIF. Unchanged sclerotic focus in the right iliac bone. Healed posterior left rib fractures. Right lower quadrant fluid collection atthe site of transplant nephrectomy with indwelling drainage catheter, now measuring 5.2 x 1.4 cm, decreased from prior, previously measuring 5.5 x 2.4 cm.    LEFT UPPER EXTREMITY:     Patient status post radial-cephalic AV fistula.    Large left upper extremity hematoma measuring 17.3 x 5.0 cm which extends into the left axilla with active arterial extravasation which persists on more delayed imaging (10, 250). Significant adjacent soft tissue infiltration.    Additional hematoma in the left anterior chest wall measuring 8 x 4.7 cm.    The outflow vein is patent however has aneurysmal dilatation measuring up to 1.2 cm.      IMPRESSION:     Large left upper extremity hematoma measuring 17.3 x 5.0 cm which extends into the left axilla with active arterial extravasation which persists on more delayed imaging.    These findings were discussed with Dr. May at 2/1/2020 10:58 PM by Dr. Hernandez with read back.    < end of copied text >

## 2020-02-03 NOTE — PROGRESS NOTE ADULT - PROBLEM SELECTOR PLAN 6
continue tamiflu
Continued Heme evaluation.
Fluid restrict.  Prior doppler neg for DVT.  - continue HD
Fluid restrict.  Prior doppler neg for DVT.  - continue HD (likely next to be done tomorrow).
Fluid restrict.  Prior doppler neg for DVT.  - continue HD per primary nephrologist
Resolved
Resolved
has been increasing , upto 822K today. Will consult Heme
stable.
could be related to infection. if no improvement , will consult Heme for further input.

## 2020-02-03 NOTE — PROGRESS NOTE ADULT - ASSESSMENT
49 year old male with ESRD on HD, now s/p ddrt on 12/8/2019 course complicated by TMA/profound ATN, who presents from a HD center with a fever and cough. He was initially admitted with influenza. He is s/p washout of infected collection and biopsy which revealed ATN.  He was found to have diffusely enlarging perinephric fluid collection.    While on telemetry, he had an episode of a wide complex tachycardia. It initially appears irregular, suggesting an atrial arrhythmia with aberrancy, though the remainder appears more like an episode of non-sustained VT.   s/p washout on 1/1/2020 with worsening sepsis and transfer to ICU, s/p IR drainage of perinephric collection on 1/6. 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 He underwent operative repair of right external iliac artery with pericardial patch, transplant nephrectomy, RLE thrombectomy, and IVC filter on 1/9.     s/p corrina ->tachy arrest with tdp, af rvr, now on amio, with large PE    He has now returned to the SICU with significant lue swelling and active arterial extravasation, with drop in blood counts. He is s/p angiogram with tiny pseudoaneurysm.    - remains in sr, despite this  - had developed malignant arrhythmias which seemed to start with bradycardia, leading to more tachycardia and bursts of VT and rapid AF.  The apparent af degenerated into torsades.  He was shocked and has been maintaining SR since. Arrhythmias were most likely triggered by the massive vte event, are unlikely to represent a primary cardiac issue  - cont amio at 200 daily.   - hold metoprolol  - repeat ekg daily to evaluate qtc, while on antifungals/amiodarone  - hold asa     - was on apixaban for his PE  - this has been held in the setting of significant arterial extravasation at his fistula.  - trend CBC. Transfuse to keep hb >8  - cont to hold a/c  - echo with preserved/hyperdynamic lv, with RV failure consistent with acute RV overload  - Further cardiac workup will depend on clinical course.  - will follow with you

## 2020-02-03 NOTE — PROGRESS NOTE ADULT - ATTENDING COMMENTS
agree with above  anticoagulation on hold  transfused one unit last night  will watch closely  may need dialysis

## 2020-02-03 NOTE — PROGRESS NOTE ADULT - ATTENDING COMMENTS
Pt seen and examined. Chart reviewed. Resident note confirmed. Pt is a 49 year old male with a medical history signficant for CAD/HTN/cardiac arrest/PE/ESRD/DDRT (s/p explantation) who returned to [facility] with fever/leukocytosis. Work-up revealed a recurrent perinephric hematoma and led to DDRT explantation. Pt developed a DVT and a GFF was placed. Despite this, pt sustained a PE with cardiac arrest. He was anticoagulated and bled again. A/C was once again placed on hold. Pt underwent HD yesterday and bled into his arm. Angiogram was unrevealing. Pt is pending permcath access for HD.     PMH/PSH/MEDS/ALL/SH/FH/ROS:  Unchanged from H&P  Vitals/PE/Labs/radiographs:  Reviewed      A/p    Neuro:	Post operative pain  	Continue pain control    CVS:	CAD/HTN  	H/o PE with cardiac arrest  	AC remains contraindicated  	Echo reviewed  	Continue asa/metoprolol    Pulm:	Atelectasis  	ISP    GI:	S/p ileus  	Reg diet as tolerated    :	ESRD  	Bleeding complication from AV fistula  	Will need permcath  	VIR consult  	  Heme:	Acute blood loss anema  	Pt transfused with 6U PRBC in the last 24 hours  	Continue serial H/h    ID:	Suspected infected hematoma  	continue ABX per ID    Endo:	Continue glycemic control

## 2020-02-03 NOTE — PROGRESS NOTE ADULT - PROBLEM SELECTOR PLAN 1
Last HD treatment on 1/30/20, with 2L of UF tolerated. Follows with nephrologist, Dr Amaya.  Had dialysis on 2/1/2020 with fistula infiltration/ hematoma and multiple transfusions.  Seen by vascular - no intervention at this time.  may need to switch dialysis access to [facility] temporarily.

## 2020-02-03 NOTE — PROGRESS NOTE ADULT - SUBJECTIVE AND OBJECTIVE BOX
[facility] - Melissa Kelsey, Enrique, Maximino, Marin, Ravi Goodson  Office Number:  [phone]    Patient resting comfortably in bed in NAD.  Laying flat with no respiratory distress.  No complaints of chest pain, dyspnea, palpitations, PND, or orthopnea.  left arm swollen, s/p ir angiogram yesterday    ROS: negative unless otherwise mentioned.    Telemetry:  sr    MEDICATIONS  (STANDING):  allopurinol 100 milliGRAM(s) Oral daily  aMIOdarone    Tablet 200 milliGRAM(s) Oral daily  brimonidine 0.2% Ophthalmic Solution 1 Drop(s) Both EYES three times a day  chlorhexidine 2% Cloths 1 Application(s) Topical <User Schedule>  chlorhexidine 4% Liquid 1 Application(s) Topical <User Schedule>  epoetin trey Injectable 66672 Unit(s) IV Push <User Schedule>  famotidine    Tablet 20 milliGRAM(s) Oral daily  gabapentin 100 milliGRAM(s) Oral every 12 hours  latanoprost 0.005% Ophthalmic Solution 1 Drop(s) Both EYES daily  meropenem  IVPB 500 milliGRAM(s) IV Intermittent every 12 hours  micafungin IVPB 100 milliGRAM(s) IV Intermittent every 24 hours  Nephro-candace 1 Tablet(s) Oral daily    MEDICATIONS  (PRN):  acetaminophen   Tablet .. 975 milliGRAM(s) Oral every 6 hours PRN Mild Pain (1 - 3)  HYDROmorphone  Injectable 0.5 milliGRAM(s) IV Push every 2 hours PRN breakthrough pain  oxyCODONE    IR 10 milliGRAM(s) Oral every 6 hours PRN Severe Pain (7 - 10)  oxyCODONE    IR 5 milliGRAM(s) Oral every 4 hours PRN Moderate Pain (4 - 6)      Allergies    coconut (Anaphylaxis)  morphine (Pruritus; Rash)    Intolerances        Vital Signs Last 24 Hrs  T(C): 37.2 (03 Feb 2020 07:00), Max: 37.2 (03 Feb 2020 07:00)  T(F): 99 (03 Feb 2020 07:00), Max: 99 (03 Feb 2020 07:00)  HR: 75 (03 Feb 2020 09:00) (64 - 75)  BP: 135/83 (03 Feb 2020 09:00) (100/55 - 158/86)  BP(mean): 99 (03 Feb 2020 09:00) (75 - 115)  RR: 19 (03 Feb 2020 09:00) (0 - 22)  SpO2: 100% (03 Feb 2020 09:00) (97% - 100%)    I&O's Summary    02 Feb 2020 07:01  -  03 Feb 2020 07:00  --------------------------------------------------------  IN: 1700 mL / OUT: 90 mL / NET: 1610 mL        ON EXAM:    Constitutional: NAD, awake    HEENT: Moist Mucous Membranes, Anicteric  Pulmonary: Decreased breath sounds b/l. No rales, crackles or wheeze appreciated.   Cardiovascular: Regular, S1 and S2, No murmurs, rubs, gallops or clicks  Gastrointestinal: Bowel Sounds present, soft, nontender.   Lymph: + peripheral edema, legs wrapped jolynn. No lymphadenopathy.  Skin: No visible rashes or ulcers.  Psych:  Mood & affect appropriate for situation  Significant swelling of his lue    LABS: All Labs Reviewed:                        9.0    17.96 )-----------( 394      ( 03 Feb 2020 06:11 )             25.9                         7.3    17.83 )-----------( 389      ( 03 Feb 2020 02:58 )             21.8                         8.1    18.99 )-----------( 422      ( 02 Feb 2020 22:51 )             24.1     03 Feb 2020 02:58    131    |  95     |  32     ----------------------------<  110    4.8     |  22     |  6.36   02 Feb 2020 03:50    135    |  99     |  28     ----------------------------<  110    4.6     |  24     |  5.67   01 Feb 2020 23:28    132    |  97     |  27     ----------------------------<  116    4.2     |  24     |  5.57     Ca    7.8        03 Feb 2020 02:58  Ca    7.7        02 Feb 2020 03:50  Ca    7.8        01 Feb 2020 23:28  Phos  5.8       03 Feb 2020 02:58  Phos  4.9       02 Feb 2020 03:50  Phos  4.4       01 Feb 2020 23:28  Mg     2.2       03 Feb 2020 02:58  Mg     1.9       02 Feb 2020 03:50  Mg     1.8       01 Feb 2020 23:28    TPro  4.5    /  Alb  2.0    /  TBili  0.5    /  DBili  0.3    /  AST  16     /  ALT  5      /  AlkPhos  166    03 Feb 2020 02:58  TPro  4.2    /  Alb  1.6    /  TBili  0.7    /  DBili  0.4    /  AST  16     /  ALT  6      /  AlkPhos  184    02 Feb 2020 03:50  TPro  4.2    /  Alb  1.8    /  TBili  0.5    /  DBili  0.3    /  AST  15     /  ALT  5      /  AlkPhos  199    01 Feb 2020 23:28    PT/INR - ( 03 Feb 2020 02:58 )   PT: 14.8 sec;   INR: 1.28 ratio         PTT - ( 03 Feb 2020 02:58 )  PTT:35.0 sec  CARDIAC MARKERS ( 02 Feb 2020 17:13 )  x     / x     / 86 U/L / x     / x      CARDIAC MARKERS ( 01 Feb 2020 17:39 )  x     / x     / 37 U/L / x     / x          Blood Culture:

## 2020-02-03 NOTE — PROGRESS NOTE ADULT - ASSESSMENT
Assessment: 50 y/o male presenting with influenza A with a hospital course complicated by:  - Infected transplant perinephric hematoma s/p right groin exploration, washout, & biopsy c/b persistent perinephric collection s/p FNA  - Right external iliac & common femoral DVTs s/p IVC filter & thrombectomy of the RLE veins  - Gross hematuria w/ pseudoaneurysm of the right external iliac & transplant renal artery seen on IR angiogram s/p right groin exploration, washout of the RP space, and transplant nephrectomy w/ repair of the right external iliac artery w/ a bovine patch  - Iliac fossa collection s/p IR drainage  - Cardiac arrest secondary to a saddle PE      L infiltration with likely small amount of ongoing active bleeding with large LUE hematoma while patient was on therapeutic anticoagulation    Plan   - continue to hold anticoagulation   - no vascular surgery intervention at this time  - continue to monitor h&h  - continue with compression and elevation  - continue to monitor neurovascular exam. no neurovascular deficits noted at this time   - continue excellent care per sicu Assessment: 48 y/o male presenting with influenza A with a hospital course complicated by:  - Infected transplant perinephric hematoma s/p right groin exploration, washout, & biopsy c/b persistent perinephric collection s/p FNA  - Right external iliac & common femoral DVTs s/p IVC filter & thrombectomy of the RLE veins  - Gross hematuria w/ pseudoaneurysm of the right external iliac & transplant renal artery seen on IR angiogram s/p right groin exploration, washout of the RP space, and transplant nephrectomy w/ repair of the right external iliac artery w/ a bovine patch  - Iliac fossa collection s/p IR drainage  - Cardiac arrest secondary to a saddle PE  - now with L infiltration with likely small amount of ongoing active bleeding with large LUE hematoma while patient was on therapeutic anticoagulation    Plan:  - continue to hold anticoagulation   - no vascular surgery intervention at this time  - continue to monitor h&h  - continue with compression and elevation  - continue to monitor neurovascular exam. no neurovascular deficits noted at this time   - continue excellent care per sicu

## 2020-02-03 NOTE — PROGRESS NOTE ADULT - SUBJECTIVE AND OBJECTIVE BOX
[facility] DIVISION OF KIDNEY DISEASES AND HYPERTENSION -- FOLLOW UP NOTE  --------------------------------------------------------------------------------  Chief Complaint:    24 hour events/subjective:        PAST HISTORY  --------------------------------------------------------------------------------  No significant changes to PMH, PSH, FHx, SHx, unless otherwise noted    ALLERGIES & MEDICATIONS  --------------------------------------------------------------------------------  Allergies    coconut (Anaphylaxis)  morphine (Pruritus; Rash)    Intolerances      Standing Inpatient Medications  allopurinol 100 milliGRAM(s) Oral daily  aMIOdarone    Tablet 200 milliGRAM(s) Oral daily  brimonidine 0.2% Ophthalmic Solution 1 Drop(s) Both EYES three times a day  chlorhexidine 2% Cloths 1 Application(s) Topical <User Schedule>  chlorhexidine 4% Liquid 1 Application(s) Topical <User Schedule>  epoetin trey Injectable 82940 Unit(s) IV Push <User Schedule>  famotidine    Tablet 20 milliGRAM(s) Oral daily  gabapentin 100 milliGRAM(s) Oral every 12 hours  latanoprost 0.005% Ophthalmic Solution 1 Drop(s) Both EYES daily  meropenem  IVPB 500 milliGRAM(s) IV Intermittent every 12 hours  micafungin IVPB 100 milliGRAM(s) IV Intermittent every 24 hours  Nephro-candace 1 Tablet(s) Oral daily    PRN Inpatient Medications  acetaminophen   Tablet .. 975 milliGRAM(s) Oral every 6 hours PRN  HYDROmorphone  Injectable 0.5 milliGRAM(s) IV Push every 2 hours PRN  oxyCODONE    IR 10 milliGRAM(s) Oral every 6 hours PRN  oxyCODONE    IR 5 milliGRAM(s) Oral every 4 hours PRN      REVIEW OF SYSTEMS  --------------------------------------------------------------------------------  Gen: No fatigue, fevers/chills, weakness  Skin: No rashes  Head/Eyes/Ears/Mouth: No headache;No sore throat  Respiratory: No dyspnea, cough,   CV: No chest pain, PND, orthopnea  GI: No abdominal pain, diarrhea, constipation, nausea, vomiting  Transplant: No pain  : No increased frequency, dysuria, hematuria, nocturia  MSK: No joint pain/swelling; no back pain; no edema  Neuro: No dizziness/lightheadedness, weakness, seizures, numbness, tingling  Psych: No significant nervousness, anxiety, stress, depression    All other systems were reviewed and are negative, except as noted.    VITALS/PHYSICAL EXAM  --------------------------------------------------------------------------------  T(C): 36.5 (02-03-20 @ 11:00), Max: 37.2 (02-03-20 @ 07:00)  HR: 72 (02-03-20 @ 11:00) (64 - 75)  BP: 130/68 (02-03-20 @ 11:00) (109/57 - 158/86)  RR: 15 (02-03-20 @ 11:00) (0 - 20)  SpO2: 100% (02-03-20 @ 11:00) (97% - 100%)  Wt(kg): --  Height (cm): 180.34 (02-02-20 @ 22:30)  Weight (kg): 63.5 (02-02-20 @ 22:30)  BMI (kg/m2): 19.5 (02-02-20 @ 22:30)  BSA (m2): 1.81 (02-02-20 @ 22:30)      02-02-20 @ 07:01  -  02-03-20 @ 07:00  --------------------------------------------------------  IN: 1700 mL / OUT: 90 mL / NET: 1610 mL      Physical Exam:  	Gen: NAD  	HEENT: PERRL, supple neck, clear oropharynx  	Pulm: CTA B/L  	CV: RRR, S1S2; no rub  	Back: No spinal or CVA tenderness; no sacral edema  	Abd: +BS, soft, nontender/nondistended                      Transplant: No tenderness, swelling  	: No suprapubic tenderness  	UE: Warm, FROM; no edema; no asterixis  	LE: Warm, FROM; no edema  	Neuro: No focal deficits  	Psych: Normal affect and mood  	Skin: Warm, without rashes      LABS/STUDIES  --------------------------------------------------------------------------------              8.5    19.11 >-----------<  404      [02-03-20 @ 11:16]              25.2     131  |  95  |  32  ----------------------------<  110      [02-03-20 @ 02:58]  4.8   |  22  |  6.36        Ca     7.8     [02-03-20 @ 02:58]      Mg     2.2     [02-03-20 @ 02:58]      Phos  5.8     [02-03-20 @ 02:58]    TPro  4.5  /  Alb  2.0  /  TBili  0.5  /  DBili  0.3  /  AST  16  /  ALT  5   /  AlkPhos  166  [02-03-20 @ 02:58]    PT/INR: PT 14.8 , INR 1.28       [02-03-20 @ 02:58]  PTT: 35.0       [02-03-20 @ 02:58]    CK 86      [02-02-20 @ 17:13]    Creatinine Trend:  SCr 6.36 [02-03 @ 02:58]  SCr 5.67 [02-02 @ 03:50]  SCr 5.57 [02-01 @ 23:28]  SCr 5.43 [02-01 @ 17:39]  SCr 4.91 [02-01 @ 06:29]          BK Virus DNA by PCR:   [facility] Assay Range: 39 copies/mL to 1.00E+10 copies/mL  The limit of quantitation (LOQ) is 39 copies/mL. BK virus DNA  detected below the LOQ will be reported as Detected:<39 copies/mL.  This test was developed and its performance characteristics  determined by [facility]. It has not been cleared or approved  by the [place]. Food and Drug Administration. Results should be used in  conjunction with clinical findings, and should not form the sole  basis for a diagnosis or treatment decision.  ____________________________________________________________  Performed at:  [facility]  [address]  [place] [address]  Laura Pettit PhD HCLD(ABB)  CLIA# [id number] (01-14 @ 11:42)          Iron 23, TIBC 189, %sat 12      [01-27-20 @ 14:03]  HbA1c 5.2      [01-13-20 @ 22:59]    HBsAb 351.0      [01-11-20 @ 16:09]  HBsAg Nonreact      [01-11-20 @ 16:09]  HBcAb Nonreact      [01-11-20 @ 16:09]  HCV 0.09, Nonreact      [01-11-20 @ 16:09]

## 2020-02-03 NOTE — PROGRESS NOTE ADULT - SUBJECTIVE AND OBJECTIVE BOX
SURGICAL INTENSIVE CARE UNIT DAILY PROGRESS NOTE    24 HOUR EVENTS:   -- Persistent decrease in H&H concerning for active hemorrhage.  -- Patient received 2 additional units of pRBC with transient response.   -- Transfused 1 unit of Platelets and 20 mcg of DDAVP  -- S/p left upper extremity arteriogram with IR. No actively bleeding vessel was identified. A tiny pseudoaneurysm (in line with bleeding site on CT angio but unlikely to be causal by virtue of its size) was identified and embolized by wire dissection.   -- Diet advanced.      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 Patient was admitted and hospital course is as follows:    12/27 - started on Tamiflu, Duplex of the transplanted kidney demonstrated an increase in the perinephric hematoma from 5.7 cm to 9.4 cm  12/29 - patient reporting dysuria so urine culture sent, which was positive for Enterobacter cloacae  01/01 - s/p right groin exploration, washout of the perinephric hematoma, and biopsy of the transplanted kidney, cultures of the hematoma positive for Enterobacter cloacae & E. coli, biopsy demonstrates  01/05 - patient noted to be febrile and hypotensive with new episodes of wide complex tachycardia on telemetry, labs significant for worsening leukocytosis, cultures sent, CT scan obtained & demonstrated a persistent perinephric collection, admitted to SICU for hemodynamic monitoring  01/06 - FNA w/ IR with aspiration of 18 mL of clear & pale yellow fluid but cultures negative for any growth, patient began having gross hematuria requiring CBI  01/07 - repeat imaging demonstrated no change in the size of the perinephric collection  01/08 - RLE edema was noted so a LE Duplex was obtained, which demonstrated a right external iliac & common femoral vein DVT, vascular surgery consulted, patient started on a heparin infusion  01/10 - patient became altered with worsening hematuria despite CBI, patient was taken to IR for an angiogram, which demonstrated a pseudoaneurysm of the transplant renal artery & external iliac artery anastomosis so the pseudoaneurysm was stented & an IVC filter was placed, patient taken to the OR and is s/p right groin exploration, washout of the RP space, transplant nephrectomy w/ repair of the right external iliac artery w/ a bovine patch & removal of the stent placed by IR, and thrombectomy of the RLE veins, returned to SICU still intubated  01/13 - acutely bradycardic and hypotensive progressing into PEA arrest with progression into torsades requiring ACLS for a total of ~12 mins, imaging revealed saddle PE with TTE demonstrating RV strain, PERT team consulted, management with heparin infusion as patient is not a tPA candidate, did not require vasopressors shortly after ROSC  01/15 - ultrasound with large right iliac fossa collection s/p percutaneous drainage by IR, 220 mL of dark red fluid drained and 10 Fr drain was left in placed  01/16 - extubated  01/19 - RUE Duplex with thrombosis of the brachial & cephalic veins, RLQ ultrasound with interval decrease in right iliac fossa collection  01/21 - CTA chest and CT abdomen & pelvis obtained demonstrating known RLL and LLL lobar artery PEs but resolution of PE in bilateral pulmonary arteries, new BLL wedge-like opacities likely representing pulmonary infarcts, and interval decrease in RLQ fluid collection  01/22 - RLQ IR drain upsized by IR from 10 Fr to 12 Fr  01/23 - Changed heparin infusion to Eliquis, stable for transfer to floor on 1/25.   1/23-2/1: No acute issues, rehab planning. However on 2/1 while undergoing  hemodialysis through L AVF, had massive infiltration, transferred to Surgical Intensive Care Unit for q1h neurovascular checks with concern for upper arm compartment syndrome.       NEURO  Exam: AOx3  Meds: acetaminophen   Tablet .. 975 milliGRAM(s) Oral every 6 hours PRN Mild Pain (1 - 3)  gabapentin 100 milliGRAM(s) Oral every 12 hours  HYDROmorphone  Injectable 0.5 milliGRAM(s) IV Push every 2 hours PRN breakthrough pain  oxyCODONE    IR 10 milliGRAM(s) Oral every 6 hours PRN Severe Pain (7 - 10)  oxyCODONE    IR 5 milliGRAM(s) Oral every 4 hours PRN Moderate Pain (4 - 6)  [x] Adequacy of sedation and pain control has been assessed and adjusted      RESPIRATORY  RR: 9 (02-02-20 @ 22:30) (4 - 22)  SpO2: 100% (02-02-20 @ 22:30) (99% - 100%)  Wt(kg): --  Exam: unlabored, clear to auscultation bilaterally    CARDIOVASCULAR  HR: 71 (02-02-20 @ 22:30) (56 - 75)  BP: 135/60 (02-02-20 @ 22:30) (85/53 - 145/67)  BP(mean): 87 (02-02-20 @ 22:30) (65 - 111)  ABP: --  ABP(mean): --  Wt(kg): --  CVP(cm H2O): --  VBG - ( 02 Feb 2020 12:08 )  pH: 7.40  /  pCO2: 43    /  pO2: 36    / HCO3: 26    / Base Excess: 1.8   /  SaO2: 69     Lactate: 1.1    Exam:  Cardiac Rhythm:  Perfusion     [x ]Adequate   [ ]Inadequate  Mentation   [ x]Normal       [ ]Reduced  Extremities  [ x]Warm         [ ]Cool  Volume Status [ ]Hypervolemic [ ]Euvolemic [x]Hypovolemic  Meds: aMIOdarone    Tablet 200 milliGRAM(s) Oral daily        GI/NUTRITION  Exam: Wound vac in place.   Diet: Regular diet.  Meds: famotidine    Tablet 20 milliGRAM(s) Oral daily      GENITOURINARY  I&O's Detail  02-01 @ 07:01  -  02-02 @ 07:00  --------------------------------------------------------  IN:    Oral Fluid: 395 mL    Packed Red Blood Cells: 1050 mL    Solution: 100 mL    Solution: 100 mL    Solution: 50 mL  Total IN: 1695 mL    OUT:    Bulb: 120 mL  Total OUT: 120 mL  Total NET: 1575 mL    02-02 @ 07:01  -  02-03 @ 01:20  --------------------------------------------------------  IN:    Packed Red Blood Cells: 650 mL    Platelets - Single Donor: 350 mL    Solution: 50 mL    Solution: 100 mL    Solution: 100 mL  Total IN: 1250 mL    OUT:    Bulb: 60 mL  Total OUT: 60 mL  Total NET: 1190 mL    Weight (kg): 63.5 (02-02 @ 22:30)  02-02    135  |  99  |  28<H>  ----------------------------<  110<H>  4.6   |  24  |  5.67<H>    Ca    7.7<L>      02 Feb 2020 03:50  Phos  4.9     02-02  Mg     1.9     02-02  TPro  4.2<L>  /  Alb  1.6<L>  /  TBili  0.7  /  DBili  0.4<H>  /  AST  16  /  ALT  6<L>  /  AlkPhos  184<H>  02-02  Meds: Nephro-candace 1 Tablet(s) Oral daily        HEMATOLOGIC  Meds:                      8.1    18.99 )-----------( 422      ( 02 Feb 2020 22:51 )             24.1     PT/INR - ( 02 Feb 2020 03:50 )   PT: 18.8 sec;   INR: 1.62 ratio    PTT - ( 02 Feb 2020 03:50 )  PTT:37.4 sec  Transfusion     [x] PRBC   [x] Platelets   [ ] FFP   [ ] Cryoprecipitate [x] DDAVP    INFECTIOUS DISEASES  T(C): 36.9 (02-02-20 @ 22:30), Max: 36.9 (02-02-20 @ 19:00)  Wt(kg): --  WBC Count: 18.99 K/uL (02-02 @ 22:51)  WBC Count: 18.68 K/uL (02-02 @ 20:27)  WBC Count: 17.38 K/uL (02-02 @ 16:29)  WBC Count: 13.96 K/uL (02-02 @ 12:16)  WBC Count: 14.57 K/uL (02-02 @ 08:10)  WBC Count: 14.68 K/uL (02-02 @ 03:50)    Recent Cultures:  Meds: epoetin trey Injectable 75815 Unit(s) IV Push <User Schedule>  meropenem  IVPB 500 milliGRAM(s) IV Intermittent every 12 hours  micafungin IVPB 100 milliGRAM(s) IV Intermittent every 24 hours    ENDOCRINE  Capillary Blood Glucose  Meds: allopurinol 100 milliGRAM(s) Oral daily    ACCESS DEVICES:  [ ] Peripheral IV  [ ] Central Venous Line	[ ] R	[ ] L	[ ] IJ	[ ] Fem	[ ] SC	Placed:   [ ] Arterial Line		[ ] R	[ ] L	[ ] Fem	[ ] Rad	[ ] Ax	Placed:   [ ] PICC:					[ ] Mediport  [ ] Urinary Catheter, Date Placed:   [ ] Necessity of urinary, arterial, and venous catheters discussed    OTHER MEDICATIONS:  brimonidine 0.2% Ophthalmic Solution 1 Drop(s) Both EYES three times a day  chlorhexidine 2% Cloths 1 Application(s) Topical <User Schedule>  chlorhexidine 4% Liquid 1 Application(s) Topical <User Schedule>  latanoprost 0.005% Ophthalmic Solution 1 Drop(s) Both EYES daily      CODE STATUS:     IMAGING:

## 2020-02-03 NOTE — PROGRESS NOTE ADULT - SUBJECTIVE AND OBJECTIVE BOX
Transplant Surgery - Multidisciplinary Rounds  --------------------------------------------------------------  R DDRT    Date:  12/8/19     Present: Patient seen with multidisciplinary team including Transplant Surgeon: Dr. Cruz. Transplant Nephrologist: Dr. Puckett. NP/PA Dino Aviles in am rounds and examined with Dr. Cruz.  Disciplines not in attendance will be notified of the plan.     HPI: 49M PMHx of ESRD on HD  ( via LUE AVF), HTN, Gout, Glaucoma, NET of pancreas (s/p robotic distal panc/splenectomy at [place] 2015 by Dr. Young, followed by Dr. Raphael, [facility] Oncologist), RA with hand contractures. He underwent DDRT on 12/8/19. Post op course c/b DGF requiring HD, thrombocytopenia, elevated LDH and haptoglobin. Schistocytes  on peripheral smear concerning for TMA. Envarsus held. Received Belatacept 12/13. Started on PLEX (12/12, 12/15). Underwent renal bx on 12/13 c/w profound ATN.  Discharged home on 12/20/19 w/ outpatient HD.     Re-admitted 12/27 with influenza, completed treatement with Tamiflu on 12/31. Urine cx on admission grew Ent cloacae, was started on meropenem 12/31. Renal US on admission with increasing hematoma.   ·	OR 1/1 for ex-lap, hematoma evacuation, washout and renal bx. (c/w ATN)  OR cxs grew CRE and e coli. Post op course c/b:   ·	fevers and leukocytosis, CT a/p (1/4) with increased perinephric collection. Underwent IR guided collection 1/6   ·	New onset hematuria, martinez inserted 1/7, started CBI  ·	RLE edema, RLE doppler (1/8) with acute above the knee thrombus of R external iliac, common femoral and femoral veins, Acute calf vein thrombus affecting R soleal vein, Heparin gtt initiated   ·	AMS 1/9  (head CT neg), hyponatremic  ·	Significant hematuria and bleeding around the martinez. Angio 1/9 showed actively bleeding pseudoaneurysm at the anastomosis of the transplant renal artery with the recipient iliac artery. A stent was placed in the iliac artery to  control hemorrhage. IVC filter placed.   ·	Emergent OR 1/9-1/10 for graft nephrectomy;  ureter to bladder anastomosis was completely disrupted, repaired the R external iliac artery with bovine pericardial patch, repaired right external iliac vein, performed thrombectomy of RLE veins, Intra op received 3u PRBC.   ·	Extubated 1/11  ·	1/13 Saddle PE, with right heart strain and complicated by PEA arrest followed by [place], ROSC achieved after ACLS; re-intubated  ·	1/15 Renal US with 13.7 x 5.0 x 1.5 cm in RLQ transplant bed; s/p bedside IR guided drainage, pigtail in place. Fluid cx to date with NG  ·	1/21 CT C/A/P showing persistent collection, catheter at superior portion of collection  ·	1/22 IR pigtail drain repositioned and upsized to 12Fr catheter w/ increased output  ·	1/23 Transitioned to Eliquis from Heparin drip  ·	2/1  AVF infiltrated/ w/ - arterial extrav/ bleeding  CW hematoma     Interval events:    -Afebrile, stable vitals.  - S/P AFV cannulation for HD 2/1 with infiltration causing extravasation/bleeding to the left upper arm and chest noted on CTA  - Yest downtrending H/H received 1 unit PRBC overnight    - S/p left upper extremity arteriogram with IR. No actively bleeding vessel was identified  - Left femoral cortis placed by ICU team for access  - Eliquis placed on hold  -LE w/ minimal improvement in edema.   - Plts trending down  Heme following   -Hemolysis work-up in progress, will f/u heme recs.  -On meropenem and micafungin.       cxs:   12/29: Urine Cx: Enterobacter Cloacae  1/1 OR Perinephric collection Cx:  Carbapenem resistant Ent Cloacae, E. Coli  1/1: R kidney abscess swab: Enterobacter Cloacae  1/1  OR tissue: Enterobacter Cloacae  1/5: Urine Cx: Enteropbacter Cloacae  1/5: Skin incision Cx (bedside): NG  1/6: Adominal fluid Cx from IR:  NG   1/7: Urine Cx:  NG  1/7: BCX2:  NG  1/10 retroperit hematoma - enerobacter   1/10 bladder hematoma - enterobacter  1/15 Drainage fluid - NGTD  1/16: BC X2: NGTD  1/18: SPutum: Normal harvey  1/21: BC X2: NTGD  1/22: IR body fluid Cx: GNR pre-milian    Potential Discharge date: pending clinical improvement        MEDICATIONS  (STANDING):  allopurinol 100 milliGRAM(s) Oral daily  aMIOdarone    Tablet 200 milliGRAM(s) Oral daily  brimonidine 0.2% Ophthalmic Solution 1 Drop(s) Both EYES three times a day  chlorhexidine 2% Cloths 1 Application(s) Topical <User Schedule>  chlorhexidine 4% Liquid 1 Application(s) Topical <User Schedule>  epoetin trey Injectable 32565 Unit(s) IV Push <User Schedule>  famotidine    Tablet 20 milliGRAM(s) Oral daily  gabapentin 100 milliGRAM(s) Oral every 12 hours  latanoprost 0.005% Ophthalmic Solution 1 Drop(s) Both EYES daily  meropenem  IVPB 500 milliGRAM(s) IV Intermittent every 12 hours  micafungin IVPB 100 milliGRAM(s) IV Intermittent every 24 hours  Nephro-candace 1 Tablet(s) Oral daily    MEDICATIONS  (PRN):  acetaminophen   Tablet .. 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  Carpal tunnel syndrome  Brain cyst: had MRI recently- now gone  Anemia  Gastric ulcer: Long time ago  Rheumatoid arthritis  Renal transplant recipient  Breakdown (mechanical) of penile (implanted) prosthesis, sequela  End-stage renal disease (ESRD): PERMACATH PLACEMENT  Abscess of left buttock: s/p I &amp; D  AV fistula: placement left lower arm  S/P cystoscopy: stent placement &amp; removal for kidney stones, lithiotripsy  s/p Lt Carpal tunnel: 2011      MEDICATIONS  (STANDING):  allopurinol 100 milliGRAM(s) Oral daily  aMIOdarone    Tablet 200 milliGRAM(s) Oral daily  brimonidine 0.2% Ophthalmic Solution 1 Drop(s) Both EYES three times a day  chlorhexidine 2% Cloths 1 Application(s) Topical <User Schedule>  chlorhexidine 4% Liquid 1 Application(s) Topical <User Schedule>  epoetin trey Injectable 62420 Unit(s) IV Push <User Schedule>  famotidine    Tablet 20 milliGRAM(s) Oral daily  gabapentin 100 milliGRAM(s) Oral every 12 hours  latanoprost 0.005% Ophthalmic Solution 1 Drop(s) Both EYES daily  meropenem  IVPB 500 milliGRAM(s) IV Intermittent every 12 hours  micafungin IVPB 100 milliGRAM(s) IV Intermittent every 24 hours  Nephro-candace 1 Tablet(s) Oral daily    MEDICATIONS  (PRN):  acetaminophen   Tablet .. 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  Carpal tunnel syndrome  Brain cyst: had MRI recently- now gone  Anemia  Gastric ulcer: Long time ago  Rheumatoid arthritis  Renal transplant recipient  Breakdown (mechanical) of penile (implanted) prosthesis, sequela  End-stage renal disease (ESRD): PERMACATH PLACEMENT  Abscess of left buttock: s/p I &amp; D  AV fistula: placement left lower arm  S/P cystoscopy: stent placement &amp; removal for kidney stones, lithiotripsy  s/p Lt Carpal tunnel: 2011      Vital Signs Last 24 Hrs  T(C): 36.5 (03 Feb 2020 11:00), Max: 37.2 (03 Feb 2020 07:00)  T(F): 97.7 (03 Feb 2020 11:00), Max: 99 (03 Feb 2020 07:00)  HR: 72 (03 Feb 2020 12:00) (68 - 75)  BP: 133/68 (03 Feb 2020 12:00) (109/57 - 158/86)  BP(mean): 96 (03 Feb 2020 12:00) (79 - 115)  RR: 13 (03 Feb 2020 12:00) (0 - 20)  SpO2: 100% (03 Feb 2020 12:00) (97% - 100%)    I&O's Summary    02 Feb 2020 07:01  -  03 Feb 2020 07:00  --------------------------------------------------------  IN: 1700 mL / OUT: 90 mL / NET: 1610 mL          LABS:                        8.5    19.11 )-----------( 404      ( 03 Feb 2020 11:16 )             25.2     02-03    131<L>  |  95<L>  |  32<H>  ----------------------------<  110<H>  4.8   |  22  |  6.36<H>    Ca    7.8<L>      03 Feb 2020 02:58  Phos  5.8     02-03  Mg     2.2     02-03    TPro  4.5<L>  /  Alb  2.0<L>  /  TBili  0.5  /  DBili  0.3<H>  /  AST  16  /  ALT  5<L>  /  AlkPhos  166<H>  02-03    PT/INR - ( 03 Feb 2020 02:58 )   PT: 14.8 sec;   INR: 1.28 ratio         PTT - ( 03 Feb 2020 02:58 )  PTT:35.0 sec    CAPILLARY BLOOD GLUCOSE        LIVER FUNCTIONS - ( 03 Feb 2020 02:58 )  Alb: 2.0 g/dL / Pro: 4.5 g/dL / ALK PHOS: 166 U/L / ALT: 5 U/L / AST: 16 U/L / GGT: x                 Cultures:    REVIEW OF SYSTEMS  Gen: + weakness   Skin: No rashes  Head/Eyes/Ears/Mouth: No headache; Normal hearing; Normal vision w/o blurriness; No sinus pain/discomfort, sore throat  Respiratory: no SOB  CV: No chest pain, PND, orthopnea  GI: incisional tenderness   : anuric  MSK: LUE+left chest with +3 edema firm to touch, + lower extremity edema, RUE edema, flank edema  Neuro: No dizziness/lightheadedness, weakness, seizures, numbness, tingling  Heme: No easy bruising or bleeding  Endo: No heat/cold intolerance  Psych: No significant nervousness, anxiety, stress, depression      PHYSICAL EXAM:    Constitutional: NAD  	Eyes: Anicteric, PERRLA  	Respiratory: CTA b/l  	Cardiovascular: RRR  	Gastrointestinal: Soft abdomen, appropriate incisional TTP, ND.  wound vac ss/murky output (wound seen during recent wound vac change, good granulation tissue developing), RLQ drain with SS drainage    Genitourinary:  anuric, large amount scrotal edema     Extremities: LUE+left chest with +3 edema firm to touch, b/l 2-3+ lower ext edema ace wrapped ( improved since yesterday); removed ACE wrap this AM and applied compression stockings; no ulcerations or skin breakdown, flank edema, RUE edema slow improvement   	Vascular: DP signal bilateral, L AVF palpable  	Neurological: A&O x3.  	Skin: no new lesions/ulcerations  	Musculoskeletal: Moving all extremities

## 2020-02-03 NOTE — PROGRESS NOTE ADULT - ASSESSMENT
49M PMHx of ESRD on HD  ( via LUE AVF), HTN, Gout, Glaucoma, NET of pancreas, RA with hand contractures. s/p DDRT 12/8/19,  Post op course c/b DGF requiring HD. Renal bx x 2 c/w profound ATN. Re-admitted with Influenza and perinephric hematoma, s/p hematoma evacuation/ abd washout and renal biopsy. Post op course c/b leukocytosis, infected hematoma, acute RLE DVT, and bleeding pseudoaneurysm at transplant renal artery anastomosis. Now s/p graft nephrectomy. Course further complicated by saddle PE causing right heart strain and hemodynamic instability leading to cardiac arrest requiring ACLS, Required IR drainage of post-op collections x2. Transferred to floor on 1/25/2020. course then complicated by hematoma by LUE fistula. Hematology consulted originally  for thrombocytosis now reconsulted for hypercoagulable workuop    PE-     #Thrombocytosis:  Pt with fluctuating plt counts- on admission, plt 300K-400K now with uptrending counts to 800K  This is most likely 2/2 reactive process given pt's recent hospital course including infection, hematoma evacuation, and cardiac arrest  - Please check iron studies, as iron deficiency can cause thrombocytosis, however pt's anemia likely 2/2 Anemia of chronic disease in setting of kidney failure.   - Peripheral smear reviewed- many large platelets seen, which corroborates a reactive process  - Can also check B12, folate levels, and hemolysis labs- LDH, retic count and haptoglobin  - Would not recommend sending Jak2 or BCR-ABL labs at this time- those can be sent in an outpatient setting if plt continues to rise   - Continue to trend CBC with diff daily   - Continue eliquis for anticoagulation   - Will set up outpatient followup for pt on discharge 49M PMHx of ESRD on HD  ( via LUE AVF), HTN, Gout, Glaucoma, NET of pancreas, RA with hand contractures. s/p DDRT 12/8/19,  Post op course c/b DGF requiring HD. Renal bx x 2 c/w profound ATN. Re-admitted with Influenza and perinephric hematoma, s/p hematoma evacuation/ abd washout and renal biopsy. Post op course c/b leukocytosis, infected hematoma, acute RLE DVT, and bleeding pseudoaneurysm at transplant renal artery anastomosis. Now s/p graft nephrectomy. Course further complicated by saddle PE causing right heart strain and hemodynamic instability leading to cardiac arrest requiring ACLS, Required IR drainage of post-op collections x2. Transferred to floor on 1/25/2020. course then complicated by hematoma by LUE fistula. Hematology consulted originally  for thrombocytosis now reconsulted for hypercoagulable workuop    PE-   found on CT angio 1-13-20  check APLS  can check [facility], Prothrombin    #Thrombocytosis:  Pt with fluctuating plt counts- on admission, plt 300K-400K now with uptrending counts to 800K  This is most likely 2/2 reactive process given pt's recent hospital course including infection, hematoma evacuation, and cardiac arrest  - Please check iron studies, as iron deficiency can cause thrombocytosis, however pt's anemia likely 2/2 Anemia of chronic disease in setting of kidney failure.   - Peripheral smear reviewed- many large platelets seen, which corroborates a reactive process  - Can also check B12, folate levels, and hemolysis labs- LDH, retic count and haptoglobin  - Would not recommend sending Jak2 or BCR-ABL labs at this time- those can be sent in an outpatient setting if plt continues to rise   - Continue to trend CBC with diff daily   - Continue eliquis for anticoagulation   - Will set up outpatient followup for pt on discharge

## 2020-02-03 NOTE — PROGRESS NOTE ADULT - ATTENDING COMMENTS
Patient received blood transfusion this morning; remains fatigued without fever. Renal Dialysis and anticoagulation continues.  Anemia persists on a variety of causes including chronic disease and inflammation. Above note recommendations agreed

## 2020-02-03 NOTE — PROGRESS NOTE ADULT - ASSESSMENT
49M PMHx of ESRD on HD  ( via LUE AVF), HTN, Gout, Glaucoma, NET of pancreas, RA with hand contractures. s/p DDRT 12/8/19,  Post op course c/b DGF requiring HD. Renal bx x 2 c/w profound ATN.     Re-admitted with Influenza and perinephric hematoma. s/p hematoma evacuation/ abd washout and renal biopsy. Post op course c/b leukocytosis, infected hematoma, acute RLE DVT, and bleeding pseudoaneurysm at txp renal artery anastomosis. Now s/p graft nephrectomy. Course further complicated by saddle PE causing right heart strain and hemodynamic instability leading to cardiac arrest requiring ACLS, Required IR drainage of post-op collections x2. Transferred to floor on 1/25/2020.     [] DDRT c/b DGF/ATN/perinephric infected hematoma/pseudoneurysm/graft nephrectomy   - S/P AFV cannulation for HD yesterday with infiltration causing extravasation/bleeding to the left upper arm and chest noted on CTA  - S/P 1 unit PRBC overnight post transfusion H&H 9.5/25.9  - f/u repeat H&H   - LT femoral cortis intact may need to change to [facility] for HD, HD  today per renal   - Eliquis placed on hold  - s/p bedside IR drainage of RLQ fluid collection (1/15), drain repositioned 1/22 and cultures NTD  - FU final IR culture, fungal culture from 1/22- NTD  - Repeat chest/abd/pelvis CT 1/28- noted with continued resolution of right lower quadrant fluid collection. Persistent bilateral lower lobe pulmonary emboli.  - ID following;  cont Meropenem, Micafungin 100mg daily.   ID plan for discharge on cipro.  - pain control  - bowel regimen  -on pepcid.   - PT/OT/OOB.  VAC change per PT  - Keep B/L LE w/ compression stockings. Has gout pain on R knee, avoid covering the R knee. Apply compression stocking on R side up to just below knee level and apply ACE bandage above the knee on the R side  up to mid/upper thigh.       [] Saddle PE, with right heart strain and complicated by PEA arrest followed by [place], ROSC achieved after ACLS  - vascular, CT and vascular cardiology following   -Repeat chest CT 1/28 noted Persistent bilateral lower lobe pulmonary emboli.   - CT 1/21: There are pulmonary emboli within the right lower lobar arteries and the left lower lobar arteries extending into the distal segments as seen on prior study. The pulmonary emboli seen in the right and left pulmonary arteries are normal longer visualized. New distal to mid brachial DVT noted  1/19 Duplex: RUE Deep vein thrombosis in the mid to distal brachial vein.  - Eliquis held since yesterday in the setting of AVF bleed   - continue ASA  - repeat ECHO with improved R heart strain    [] New Afib/Vtech  Cardiology following, BB low dose in the setting of WCT and hold BB on HD days  Continue amiodarone, card plan to continue for couple of months and will f/u as outpt    [] New acute DVT of R distal brachia vein   - continue to hold anticoagulation due to active LT AVF bleed    [] New acute DVT of R external iliac, common femoral, femoral veins, R soleal vein   - IVC filter placed 1/9, hold anticoagulation due to active afv bleed     [] Thrombocytosis  - PLT downtrending to 358 was rising PLT counts over the past week.   -Heme consulted; likely reactive. Heme work up in progress    [] Dispo  - continue tele  -discharge planning in progress d/c to acute rehab once medically stable.

## 2020-02-03 NOTE — PROGRESS NOTE ADULT - SUBJECTIVE AND OBJECTIVE BOX
Interventional Radiology Pre-Procedure Note    49y Male with extensive hospital course including hemodialysis via a left arm AVF. Following access of the fistula for dialysis the patient developed bleeding with very large left arm and left chest wall hematoma. CTA was performed demonstrating the hematoma and active extravasation of contrast, of unclear source (arterial vs fistula outflow vein). Patient has been managed in SICU requiring multiple transfusions with several episodes of hypotension, with persistent downtrending of Hb. Most recently s/p left upper extremity arteriogram without intervention. Patient is now referred to IR for tunnelled HD catheter placement.     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  Carpal tunnel syndrome  Brain cyst: had MRI recently- now gone  Anemia  Gastric ulcer: Long time ago  Rheumatoid arthritis  Renal transplant recipient  Breakdown (mechanical) of penile (implanted) prosthesis, sequela  End-stage renal disease (ESRD): PERMA CATH PLACEMENT  Abscess of left buttock: s/p I &amp; D  AV fistula: placement left lower arm  S/P cystoscopy: stent placement &amp; removal for kidney stones, lithotripsy  s/p Lt Carpal tunnel: 2011       Vital Signs Last 24 Hrs  T(C): 36.7 (03 Feb 2020 15:00), Max: 37.2 (03 Feb 2020 07:00)  T(F): 98.1 (03 Feb 2020 15:00), Max: 99 (03 Feb 2020 07:00)  HR: 75 (03 Feb 2020 17:00) (68 - 76)  BP: 150/80 (03 Feb 2020 17:00) (109/57 - 160/77)  BP(mean): 104 (03 Feb 2020 17:00) (79 - 115)  RR: 16 (03 Feb 2020 17:00) (0 - 20)  SpO2: 100% (03 Feb 2020 17:00) (97% - 100%)    Allergies:   coconut (Anaphylaxis)  morphine (Pruritus; Rash)      Physical Exam:     Labs:                         8.6    18.67 )-----------( 410      ( 03 Feb 2020 15:31 )             25.3     02-03    131<L>  |  95<L>  |  32<H>  ----------------------------<  110<H>  4.8   |  22  |  6.36<H>    Ca    7.8<L>      03 Feb 2020 02:58  Phos  5.8     02-03  Mg     2.2     02-03    TPro  4.5<L>  /  Alb  2.0<L>  /  TBili  0.5  /  DBili  0.3<H>  /  AST  16  /  ALT  5<L>  /  AlkPhos  166<H>  02-03    PT/INR - ( 03 Feb 2020 02:58 )   PT: 14.8 sec;   INR: 1.28 ratio         PTT - ( 03 Feb 2020 02:58 )  PTT:35.0 sec    Informed consent will be obtained upon ID clearance.     Plan: Pending ID clearance; Tunnelled HD catheter placement

## 2020-02-03 NOTE — PROGRESS NOTE ADULT - SUBJECTIVE AND OBJECTIVE BOX
[facility] DIVISION OF KIDNEY DISEASES AND HYPERTENSION -- FOLLOW UP NOTE  --------------------------------------------------------------------------------  Chief Complaint: LUE pain     24 hour events/subjective:  Patient was seen and examined at bedside  c/p pain and swelling in LUE       PAST HISTORY  --------------------------------------------------------------------------------  No significant changes to PMH, PSH, FHx, SHx, unless otherwise noted    ALLERGIES & MEDICATIONS  --------------------------------------------------------------------------------  Allergies    coconut (Anaphylaxis)  morphine (Pruritus; Rash)    Intolerances      Standing Inpatient Medications  allopurinol 100 milliGRAM(s) Oral daily  aMIOdarone    Tablet 200 milliGRAM(s) Oral daily  brimonidine 0.2% Ophthalmic Solution 1 Drop(s) Both EYES three times a day  chlorhexidine 2% Cloths 1 Application(s) Topical <User Schedule>  chlorhexidine 4% Liquid 1 Application(s) Topical <User Schedule>  epoetin trey Injectable 10383 Unit(s) IV Push <User Schedule>  famotidine    Tablet 20 milliGRAM(s) Oral daily  gabapentin 100 milliGRAM(s) Oral every 12 hours  latanoprost 0.005% Ophthalmic Solution 1 Drop(s) Both EYES daily  meropenem  IVPB 500 milliGRAM(s) IV Intermittent every 12 hours  micafungin IVPB 100 milliGRAM(s) IV Intermittent every 24 hours  Nephro-candace 1 Tablet(s) Oral daily    PRN Inpatient Medications  acetaminophen   Tablet .. 975 milliGRAM(s) Oral every 6 hours PRN  HYDROmorphone  Injectable 0.5 milliGRAM(s) IV Push every 2 hours PRN  oxyCODONE    IR 10 milliGRAM(s) Oral every 6 hours PRN  oxyCODONE    IR 5 milliGRAM(s) Oral every 4 hours PRN      REVIEW OF SYSTEMS  --------------------------------------------------------------------------------  Gen: No fatigue, fevers/chills, weakness  Skin: No rashes  Head/Eyes/Ears/Mouth: No headache;No sore throat  Respiratory: No dyspnea, cough,   CV: No chest pain, PND, orthopnea  GI: No abdominal pain, diarrhea, constipation, nausea, vomiting  : No increased frequency, dysuria, hematuria, nocturia  MSK: 3+ swelling b/l . R>L . 3+ swelling in LUE   Neuro: No dizziness/lightheadedness, weakness, seizures, numbness, tingling  Psych: No significant nervousness, anxiety, stress, depression    All other systems were reviewed and are negative, except as noted.    VITALS/PHYSICAL EXAM  --------------------------------------------------------------------------------  T(C): 36.5 (02-03-20 @ 11:00), Max: 37.2 (02-03-20 @ 07:00)  HR: 72 (02-03-20 @ 11:00) (64 - 75)  BP: 130/68 (02-03-20 @ 11:00) (109/57 - 158/86)  RR: 15 (02-03-20 @ 11:00) (0 - 20)  SpO2: 100% (02-03-20 @ 11:00) (97% - 100%)  Wt(kg): --  Height (cm): 180.34 (02-02-20 @ 22:30)  Weight (kg): 63.5 (02-02-20 @ 22:30)  BMI (kg/m2): 19.5 (02-02-20 @ 22:30)  BSA (m2): 1.81 (02-02-20 @ 22:30)      02-02-20 @ 07:01  -  02-03-20 @ 07:00  --------------------------------------------------------  IN: 1700 mL / OUT: 90 mL / NET: 1610 mL      Physical Exam:  	Gen: NAD  	HEENT: PERRL, supple neck, clear oropharynx  	Pulm: CTA B/L  	CV: RRR, S1S2; no rub  	Back: No spinal or CVA tenderness; no sacral edema  	Abd: +BS, soft, nontender/nondistended                      Transplant: No tenderness, swelling  	: No suprapubic tenderness  	UE: Warm, FROM; no edema; no asterixis  	LE: Warm, FROM; no edema  	Neuro: No focal deficits  	Psych: Normal affect and mood  	Skin: Warm, without rashes      LABS/STUDIES  --------------------------------------------------------------------------------              8.5    19.11 >-----------<  404      [02-03-20 @ 11:16]              25.2     131  |  95  |  32  ----------------------------<  110      [02-03-20 @ 02:58]  4.8   |  22  |  6.36        Ca     7.8     [02-03-20 @ 02:58]      Mg     2.2     [02-03-20 @ 02:58]      Phos  5.8     [02-03-20 @ 02:58]    TPro  4.5  /  Alb  2.0  /  TBili  0.5  /  DBili  0.3  /  AST  16  /  ALT  5   /  AlkPhos  166  [02-03-20 @ 02:58]    PT/INR: PT 14.8 , INR 1.28       [02-03-20 @ 02:58]  PTT: 35.0       [02-03-20 @ 02:58]    CK 86      [02-02-20 @ 17:13]    Creatinine Trend:  SCr 6.36 [02-03 @ 02:58]  SCr 5.67 [02-02 @ 03:50]  SCr 5.57 [02-01 @ 23:28]  SCr 5.43 [02-01 @ 17:39]  SCr 4.91 [02-01 @ 06:29]          BK Virus DNA by PCR:   [facility] Assay Range: 39 copies/mL to 1.00E+10 copies/mL  The limit of quantitation (LOQ) is 39 copies/mL. BK virus DNA  detected below the LOQ will be reported as Detected:<39 copies/mL.  This test was developed and its performance characteristics  determined by [facility]. It has not been cleared or approved  by the [place]. Food and Drug Administration. Results should be used in  conjunction with clinical findings, and should not form the sole  basis for a diagnosis or treatment decision.  ____________________________________________________________  Performed at:  [facility]  [address]  [place] [address]  Laura Pettit PhD HCLD(ABB)  CLIA# [id number] (01-14 @ 11:42)          Iron 23, TIBC 189, %sat 12      [01-27-20 @ 14:03]  HbA1c 5.2      [01-13-20 @ 22:59]    HBsAb 351.0      [01-11-20 @ 16:09]  HBsAg Nonreact      [01-11-20 @ 16:09]  HBcAb Nonreact      [01-11-20 @ 16:09]  HCV 0.09, Nonreact      [01-11-20 @ 16:09]

## 2020-02-03 NOTE — CHART NOTE - NSCHARTNOTEFT_GEN_A_CORE
-------------------------------------------------------------  Interventional Radiology Brief Operative Note  -------------------------------------------------------------    Pre-Procedure Diagnosis: ESRD    Post-Procedure Diagnosis: same    Indication: active bleeding in left arm following access of LUE AVF with contrast extravasation on CTA    Procedure: left upper extremity arteriography    Operators: Sebastian MUÑOZ; Leonard MUÑOZ    Anesthesia: IV sedation was provided by anesthesiology. Local anesthesia was achieved with 2% lidocaine.    EBL: minimal    Complications: none    Imaging: fluoroscopy and ultrasound    Contrast: 150cc total, Omnipaque 240/350    Findings: tiny pseudoaneurysm arising from a distal branch of one of the branches of the left brachial artery. following unsuccessful attempts to catheterize this vessel, the pseudoaneurysm was not seen angiographically, indicating dissection/vasospasm of the tiny distal branch. embolization was thus not performed.    Specimens Removed: none    Implants: none    Condition/Disposition: stable, to SICU    Follow-Up Plan of Care: ongoing management of hemorrhage by SICU/vascular surgery    For questions or concerns, please call Interventional Radiology at [phone] during business hours, or [phone] and page [phone] after 5PM and on weekends.    Everton Cortes MD  PGY5 Interventional Radiology Resident  [phone] -------------------------------------------------------------  Interventional Radiology Brief Operative Note  -------------------------------------------------------------    Pre-Procedure Diagnosis: ESRD  Post-Procedure Diagnosis: same  Indication: active bleeding in left arm following access of LUE AVF with contrast extravasation on CTA    Procedure: left upper extremity arteriography    Operators: Sebastian MUÑOZ; Leonard MUÑOZ  Anesthesia: IV sedation was provided by anesthesiology. Local anesthesia was achieved with 2% lidocaine.  EBL: minimal  Complications: none  Imaging: fluoroscopy and ultrasound  Contrast: 150cc total, Omnipaque 240/350    Findings:   -Left subclavian, internal mammary, long thoracic, axillary, brachial, deep brachial angiographies performed, as well as multiple other superselective angiographies performed.   -Tiny pseudoaneurysm arising from a distal branch of one of the branches of the left deep brachial artery, successfully embolized after wire dissection. Unlikely that this tiny pseudoaneurysm is the main contributer to the patients massive arm and chest wall hematoma.   -AVF venography, including cephalic, brachial, axillary, and subclavian venographies performed. No evidence of venous injury.      Specimens Removed: none  Implants: none  Condition/Disposition: stable, to SICU    Follow-Up Plan of Care:   -ongoing management of hemorrhage by SICU/vascular surgery  -if there is evidence of continued bleeding, then surgical/open treatment would be the next step, as the angiographic approach has been exhausted.    For questions or concerns, please call Interventional Radiology at [phone] during business hours, or [phone] and page [phone] after 5PM and on weekends.    Everton Cortes MD  PGY5 Interventional Radiology Resident  [phone] -------------------------------------------------------------  Interventional Radiology Brief Operative Note  -------------------------------------------------------------    Pre-Procedure Diagnosis: ESRD  Post-Procedure Diagnosis: same  Indication: active bleeding in left arm following access of LUE AVF with contrast extravasation on CTA    Procedure: left upper extremity arteriography    Operators: Sebastian MUÑOZ; Leonard MUÑOZ  Anesthesia: IV sedation was provided by anesthesiology. Local anesthesia was achieved with 2% lidocaine.  EBL: minimal  Complications: none  Imaging: fluoroscopy and ultrasound  Contrast: 150cc total, Omnipaque 240/350    Findings:   -Left subclavian, internal mammary, long thoracic, axillary, brachial, deep brachial angiographies performed, as well as multiple other superselective angiographies performed.   -Tiny pseudoaneurysm arising from a distal branch of one of the branches of the left deep brachial artery, successfully embolized after wire dissection. Unlikely that this tiny pseudoaneurysm is the main contributer to the patients massive arm and chest wall hematoma.   -AVF venography, including cephalic, brachial, axillary, and subclavian venographies performed. No evidence of venous injury.      Specimens Removed: none  Implants: none  Condition/Disposition: stable, to SICU    Follow-Up Plan of Care:   -ongoing management of hemorrhage by SICU/vascular surgery  -if there is evidence of continued bleeding, then surgical/open treatment would be the next step, as the angiographic approach has been exhausted.    For questions or concerns, please call Interventional Radiology at [phone] during business hours, or [phone] and page [phone] after 5PM and on weekends.    Everton Cortes MD  PGY5 Interventional Radiology Resident  [phone]            Attending Attestation  -Tiny pseudoaneurysm is unlikely the main cause of his bleeding, embolized nonetheless  -Unclear what the etiology of this large hemorrhage is.   -Any anticoagulation is to be considered contraindicated for this patient going forward. Patient has a filter.  PE is resolved in the most recent angio

## 2020-02-03 NOTE — PROGRESS NOTE ADULT - SUBJECTIVE AND OBJECTIVE BOX
VASCULAR SURGERY DAILY PROGRESS NOTE:    Subjective:  Patient seen and examined on AM rounds. Tolerating regular diet yesterday. decrease in H&H concerning for active hemorrhage, s/p 2 additional units of pRBC with transient response overnight, 1 unit of Platelets and 20 mcg of DDAVP, S/p left upper extremity arteriogram with IR. No actively bleeding vessel was identified, A tiny pseudoaneurysm (in line with bleeding site on CT angio but unlikely to be causal by virtue of its size) was identified and embolized by wire dissection.     Vital Signs Last 24 Hrs  T(C): 37.2 (2020 07:00), Max: 37.2 (2020 07:00)  T(F): 99 (2020 07:00), Max: 99 (2020 07:00)  HR: 74 (2020 08:00) (64 - 75)  BP: 130/71 (2020 08:00) (100/55 - 158/86)  BP(mean): 93 (2020 08:00) (75 - 115)  RR: 20 (2020 08:00) (0 - 22)  SpO2: 100% (2020 08:00) (97% - 100%)    Physical Exam  Constitutional: resting in bed, NAD  Respiratory: breathing comfortably on RA  Ext: Left arm wrapped in ACE; palpable radial pulse, full ROM; palpable pedal pulses b/l, full ROM    I&O's Detail    2020 07:01  -  2020 07:00  --------------------------------------------------------  IN:    Packed Red Blood Cells: 1000 mL    Platelets - Single Donor: 350 mL    Solution: 100 mL    Solution: 100 mL    Solution: 150 mL  Total IN: 1700 mL    OUT:    Bulb: 90 mL  Total OUT: 90 mL    Total NET: 1610 mL          Daily Height in cm: 180.34 (2020 22:30)    Daily Weight in k.1 (2020 06:27)    MEDICATIONS  (STANDING):  allopurinol 100 milliGRAM(s) Oral daily  aMIOdarone    Tablet 200 milliGRAM(s) Oral daily  brimonidine 0.2% Ophthalmic Solution 1 Drop(s) Both EYES three times a day  chlorhexidine 2% Cloths 1 Application(s) Topical <User Schedule>  chlorhexidine 4% Liquid 1 Application(s) Topical <User Schedule>  epoetin trey Injectable 99678 Unit(s) IV Push <User Schedule>  famotidine    Tablet 20 milliGRAM(s) Oral daily  gabapentin 100 milliGRAM(s) Oral every 12 hours  latanoprost 0.005% Ophthalmic Solution 1 Drop(s) Both EYES daily  meropenem  IVPB 500 milliGRAM(s) IV Intermittent every 12 hours  micafungin IVPB 100 milliGRAM(s) IV Intermittent every 24 hours  Nephro-candace 1 Tablet(s) Oral daily    MEDICATIONS  (PRN):  acetaminophen   Tablet .. 975 milliGRAM(s) Oral every 6 hours PRN Mild Pain (1 - 3)  HYDROmorphone  Injectable 0.5 milliGRAM(s) IV Push every 2 hours PRN breakthrough pain  oxyCODONE    IR 10 milliGRAM(s) Oral every 6 hours PRN Severe Pain (7 - 10)  oxyCODONE    IR 5 milliGRAM(s) Oral every 4 hours PRN Moderate Pain (4 - 6)      LABS:                        9.0    17.96 )-----------( 394      ( 2020 06:11 )             25.9     02-03    131<L>  |  95<L>  |  32<H>  ----------------------------<  110<H>  4.8   |  22  |  6.36<H>    Ca    7.8<L>      2020 02:58  Phos  5.8     02-03  Mg     2.2     02-03    TPro  4.5<L>  /  Alb  2.0<L>  /  TBili  0.5  /  DBili  0.3<H>  /  AST  16  /  ALT  5<L>  /  AlkPhos  166<H>  02-03    PT/INR - ( 2020 02:58 )   PT: 14.8 sec;   INR: 1.28 ratio         PTT - ( 2020 02:58 )  PTT:35.0 sec VASCULAR SURGERY DAILY PROGRESS NOTE:    Subjective:  Patient seen and examined on AM rounds. Tolerating regular diet yesterday. decrease in H&H concerning for active hemorrhage, s/p 2 additional units of pRBC with transient response overnight, 1 unit of Platelets and 20 mcg of DDAVP, S/p left upper extremity arteriogram with IR. No actively bleeding vessel was identified, A tiny pseudoaneurysm (in line with bleeding site on CT angio but unlikely to be causal by virtue of its size) was identified and embolized by wire dissection.     Vital Signs Last 24 Hrs  T(C): 37.2 (2020 07:00), Max: 37.2 (2020 07:00)  T(F): 99 (2020 07:00), Max: 99 (2020 07:00)  HR: 74 (2020 08:00) (64 - 75)  BP: 130/71 (2020 08:00) (100/55 - 158/86)  BP(mean): 93 (2020 08:00) (75 - 115)  RR: 20 (2020 08:00) (0 - 22)  SpO2: 100% (2020 08:00) (97% - 100%)    Physical Exam  Constitutional: resting in bed, NAD  Respiratory: breathing comfortably on RA  Ext: Left arm wrapped in ACE; palpable radial pulse, full ROM, neurovascular grossly intact; palpable pedal pulses b/l, full ROM    I&O's Detail    2020 07:01  -  2020 07:00  --------------------------------------------------------  IN:    Packed Red Blood Cells: 1000 mL    Platelets - Single Donor: 350 mL    Solution: 100 mL    Solution: 100 mL    Solution: 150 mL  Total IN: 1700 mL    OUT:    Bulb: 90 mL  Total OUT: 90 mL    Total NET: 1610 mL    Daily Height in cm: 180.34 (2020 22:30)    Daily Weight in k.1 (2020 06:27)    MEDICATIONS  (STANDING):  allopurinol 100 milliGRAM(s) Oral daily  aMIOdarone    Tablet 200 milliGRAM(s) Oral daily  brimonidine 0.2% Ophthalmic Solution 1 Drop(s) Both EYES three times a day  chlorhexidine 2% Cloths 1 Application(s) Topical <User Schedule>  chlorhexidine 4% Liquid 1 Application(s) Topical <User Schedule>  epoetin trey Injectable 57083 Unit(s) IV Push <User Schedule>  famotidine    Tablet 20 milliGRAM(s) Oral daily  gabapentin 100 milliGRAM(s) Oral every 12 hours  latanoprost 0.005% Ophthalmic Solution 1 Drop(s) Both EYES daily  meropenem  IVPB 500 milliGRAM(s) IV Intermittent every 12 hours  micafungin IVPB 100 milliGRAM(s) IV Intermittent every 24 hours  Nephro-candace 1 Tablet(s) Oral daily    MEDICATIONS  (PRN):  acetaminophen   Tablet .. 975 milliGRAM(s) Oral every 6 hours PRN Mild Pain (1 - 3)  HYDROmorphone  Injectable 0.5 milliGRAM(s) IV Push every 2 hours PRN breakthrough pain  oxyCODONE    IR 10 milliGRAM(s) Oral every 6 hours PRN Severe Pain (7 - 10)  oxyCODONE    IR 5 milliGRAM(s) Oral every 4 hours PRN Moderate Pain (4 - 6)      LABS:                        9.0    17.96 )-----------( 394      ( 2020 06:11 )             25.9     02-03    131<L>  |  95<L>  |  32<H>  ----------------------------<  110<H>  4.8   |  22  |  6.36<H>    Ca    7.8<L>      2020 02:58  Phos  5.8     02-  Mg     2.2     02-    TPro  4.5<L>  /  Alb  2.0<L>  /  TBili  0.5  /  DBili  0.3<H>  /  AST  16  /  ALT  5<L>  /  AlkPhos  166<H>  02-    PT/INR - ( 2020 02:58 )   PT: 14.8 sec;   INR: 1.28 ratio         PTT - ( 2020 02:58 )  PTT:35.0 sec

## 2020-02-04 LAB
ALBUMIN SERPL ELPH-MCNC: 1.9 G/DL — LOW (ref 3.3–5)
ALP SERPL-CCNC: 186 U/L — HIGH (ref 40–120)
ALT FLD-CCNC: 5 U/L — LOW (ref 10–45)
ANION GAP SERPL CALC-SCNC: 12 MMOL/L — SIGNIFICANT CHANGE UP (ref 5–17)
ANION GAP SERPL CALC-SCNC: 15 MMOL/L — SIGNIFICANT CHANGE UP (ref 5–17)
ANION GAP SERPL CALC-SCNC: 16 MMOL/L — SIGNIFICANT CHANGE UP (ref 5–17)
APTT BLD: 34.3 SEC — SIGNIFICANT CHANGE UP (ref 27.5–36.3)
AST SERPL-CCNC: 18 U/L — SIGNIFICANT CHANGE UP (ref 10–40)
BILIRUB DIRECT SERPL-MCNC: 0.3 MG/DL — HIGH (ref 0–0.2)
BILIRUB INDIRECT FLD-MCNC: 0.4 MG/DL — SIGNIFICANT CHANGE UP (ref 0.2–1)
BILIRUB SERPL-MCNC: 0.7 MG/DL — SIGNIFICANT CHANGE UP (ref 0.2–1.2)
BUN SERPL-MCNC: 36 MG/DL — HIGH (ref 7–23)
BUN SERPL-MCNC: 37 MG/DL — HIGH (ref 7–23)
BUN SERPL-MCNC: 38 MG/DL — HIGH (ref 7–23)
CALCIUM SERPL-MCNC: 7.7 MG/DL — LOW (ref 8.4–10.5)
CALCIUM SERPL-MCNC: 7.9 MG/DL — LOW (ref 8.4–10.5)
CALCIUM SERPL-MCNC: 7.9 MG/DL — LOW (ref 8.4–10.5)
CHLORIDE SERPL-SCNC: 95 MMOL/L — LOW (ref 96–108)
CHLORIDE SERPL-SCNC: 96 MMOL/L — SIGNIFICANT CHANGE UP (ref 96–108)
CHLORIDE SERPL-SCNC: 97 MMOL/L — SIGNIFICANT CHANGE UP (ref 96–108)
CO2 SERPL-SCNC: 20 MMOL/L — LOW (ref 22–31)
CO2 SERPL-SCNC: 21 MMOL/L — LOW (ref 22–31)
CO2 SERPL-SCNC: 24 MMOL/L — SIGNIFICANT CHANGE UP (ref 22–31)
CREAT SERPL-MCNC: 7.11 MG/DL — HIGH (ref 0.5–1.3)
CREAT SERPL-MCNC: 7.19 MG/DL — HIGH (ref 0.5–1.3)
CREAT SERPL-MCNC: 7.62 MG/DL — HIGH (ref 0.5–1.3)
GLUCOSE SERPL-MCNC: 130 MG/DL — HIGH (ref 70–99)
GLUCOSE SERPL-MCNC: 89 MG/DL — SIGNIFICANT CHANGE UP (ref 70–99)
GLUCOSE SERPL-MCNC: 92 MG/DL — SIGNIFICANT CHANGE UP (ref 70–99)
HCT VFR BLD CALC: 22.5 % — LOW (ref 39–50)
HCT VFR BLD CALC: 23 % — LOW (ref 39–50)
HCT VFR BLD CALC: 23.7 % — LOW (ref 39–50)
HGB BLD-MCNC: 7.5 G/DL — LOW (ref 13–17)
HGB BLD-MCNC: 7.5 G/DL — LOW (ref 13–17)
HGB BLD-MCNC: 7.9 G/DL — LOW (ref 13–17)
INR BLD: 1.24 RATIO — HIGH (ref 0.88–1.16)
MAGNESIUM SERPL-MCNC: 2.2 MG/DL — SIGNIFICANT CHANGE UP (ref 1.6–2.6)
MCHC RBC-ENTMCNC: 30.2 PG — SIGNIFICANT CHANGE UP (ref 27–34)
MCHC RBC-ENTMCNC: 30.5 PG — SIGNIFICANT CHANGE UP (ref 27–34)
MCHC RBC-ENTMCNC: 30.6 PG — SIGNIFICANT CHANGE UP (ref 27–34)
MCHC RBC-ENTMCNC: 32.6 GM/DL — SIGNIFICANT CHANGE UP (ref 32–36)
MCHC RBC-ENTMCNC: 33.3 GM/DL — SIGNIFICANT CHANGE UP (ref 32–36)
MCHC RBC-ENTMCNC: 33.3 GM/DL — SIGNIFICANT CHANGE UP (ref 32–36)
MCV RBC AUTO: 91.5 FL — SIGNIFICANT CHANGE UP (ref 80–100)
MCV RBC AUTO: 91.8 FL — SIGNIFICANT CHANGE UP (ref 80–100)
MCV RBC AUTO: 92.7 FL — SIGNIFICANT CHANGE UP (ref 80–100)
NRBC # BLD: 0 /100 WBCS — SIGNIFICANT CHANGE UP (ref 0–0)
PHOSPHATE SERPL-MCNC: 5.9 MG/DL — HIGH (ref 2.5–4.5)
PHOSPHATE SERPL-MCNC: 6 MG/DL — HIGH (ref 2.5–4.5)
PLATELET # BLD AUTO: 411 K/UL — HIGH (ref 150–400)
PLATELET # BLD AUTO: 415 K/UL — HIGH (ref 150–400)
PLATELET # BLD AUTO: 428 K/UL — HIGH (ref 150–400)
POTASSIUM SERPL-MCNC: 4.8 MMOL/L — SIGNIFICANT CHANGE UP (ref 3.5–5.3)
POTASSIUM SERPL-MCNC: 5.2 MMOL/L — SIGNIFICANT CHANGE UP (ref 3.5–5.3)
POTASSIUM SERPL-MCNC: 5.5 MMOL/L — HIGH (ref 3.5–5.3)
POTASSIUM SERPL-SCNC: 4.8 MMOL/L — SIGNIFICANT CHANGE UP (ref 3.5–5.3)
POTASSIUM SERPL-SCNC: 5.2 MMOL/L — SIGNIFICANT CHANGE UP (ref 3.5–5.3)
POTASSIUM SERPL-SCNC: 5.5 MMOL/L — HIGH (ref 3.5–5.3)
PROCALCITONIN SERPL-MCNC: 1.02 NG/ML — HIGH (ref 0.02–0.1)
PROT SERPL-MCNC: 5 G/DL — LOW (ref 6–8.3)
PROTHROM AB SERPL-ACNC: 14.4 SEC — HIGH (ref 10–12.9)
RBC # BLD: 2.45 M/UL — LOW (ref 4.2–5.8)
RBC # BLD: 2.48 M/UL — LOW (ref 4.2–5.8)
RBC # BLD: 2.59 M/UL — LOW (ref 4.2–5.8)
RBC # FLD: 15.9 % — HIGH (ref 10.3–14.5)
RBC # FLD: 15.9 % — HIGH (ref 10.3–14.5)
RBC # FLD: 16.2 % — HIGH (ref 10.3–14.5)
SODIUM SERPL-SCNC: 131 MMOL/L — LOW (ref 135–145)
SODIUM SERPL-SCNC: 132 MMOL/L — LOW (ref 135–145)
SODIUM SERPL-SCNC: 133 MMOL/L — LOW (ref 135–145)
WBC # BLD: 20.33 K/UL — HIGH (ref 3.8–10.5)
WBC # BLD: 21.2 K/UL — HIGH (ref 3.8–10.5)
WBC # BLD: 22 K/UL — HIGH (ref 3.8–10.5)
WBC # FLD AUTO: 20.33 K/UL — HIGH (ref 3.8–10.5)
WBC # FLD AUTO: 21.2 K/UL — HIGH (ref 3.8–10.5)
WBC # FLD AUTO: 22 K/UL — HIGH (ref 3.8–10.5)

## 2020-02-04 PROCEDURE — 99291 CRITICAL CARE FIRST HOUR: CPT

## 2020-02-04 PROCEDURE — 36556 INSERT NON-TUNNEL CV CATH: CPT

## 2020-02-04 PROCEDURE — 99232 SBSQ HOSP IP/OBS MODERATE 35: CPT | Mod: GC,24

## 2020-02-04 PROCEDURE — 77001 FLUOROGUIDE FOR VEIN DEVICE: CPT | Mod: 26

## 2020-02-04 PROCEDURE — 76937 US GUIDE VASCULAR ACCESS: CPT | Mod: 26

## 2020-02-04 PROCEDURE — 99232 SBSQ HOSP IP/OBS MODERATE 35: CPT

## 2020-02-04 RX ORDER — INSULIN HUMAN 100 [IU]/ML
10 INJECTION, SOLUTION SUBCUTANEOUS ONCE
Refills: 0 | Status: COMPLETED | OUTPATIENT
Start: 2020-02-04 | End: 2020-02-04

## 2020-02-04 RX ORDER — HYDROMORPHONE HYDROCHLORIDE 2 MG/ML
0.5 INJECTION INTRAMUSCULAR; INTRAVENOUS; SUBCUTANEOUS ONCE
Refills: 0 | Status: DISCONTINUED | OUTPATIENT
Start: 2020-02-04 | End: 2020-02-04

## 2020-02-04 RX ORDER — DEXTROSE 50 % IN WATER 50 %
50 SYRINGE (ML) INTRAVENOUS ONCE
Refills: 0 | Status: COMPLETED | OUTPATIENT
Start: 2020-02-04 | End: 2020-02-04

## 2020-02-04 RX ORDER — SODIUM CHLORIDE 9 MG/ML
10 INJECTION INTRAMUSCULAR; INTRAVENOUS; SUBCUTANEOUS
Refills: 0 | Status: DISCONTINUED | OUTPATIENT
Start: 2020-02-04 | End: 2020-02-05

## 2020-02-04 RX ORDER — CHLORHEXIDINE GLUCONATE 213 G/1000ML
1 SOLUTION TOPICAL
Refills: 0 | Status: DISCONTINUED | OUTPATIENT
Start: 2020-02-04 | End: 2020-02-05

## 2020-02-04 RX ORDER — MEROPENEM 1 G/30ML
500 INJECTION INTRAVENOUS EVERY 24 HOURS
Refills: 0 | Status: DISCONTINUED | OUTPATIENT
Start: 2020-02-04 | End: 2020-02-14

## 2020-02-04 RX ADMIN — AMIODARONE HYDROCHLORIDE 200 MILLIGRAM(S): 400 TABLET ORAL at 05:01

## 2020-02-04 RX ADMIN — Medication 50 MILLILITER(S): at 01:42

## 2020-02-04 RX ADMIN — ERYTHROPOIETIN 10000 UNIT(S): 10000 INJECTION, SOLUTION INTRAVENOUS; SUBCUTANEOUS at 20:05

## 2020-02-04 RX ADMIN — HYDROMORPHONE HYDROCHLORIDE 0.5 MILLIGRAM(S): 2 INJECTION INTRAMUSCULAR; INTRAVENOUS; SUBCUTANEOUS at 22:55

## 2020-02-04 RX ADMIN — GABAPENTIN 100 MILLIGRAM(S): 400 CAPSULE ORAL at 17:57

## 2020-02-04 RX ADMIN — Medication 1 TABLET(S): at 12:57

## 2020-02-04 RX ADMIN — BRIMONIDINE TARTRATE 1 DROP(S): 2 SOLUTION/ DROPS OPHTHALMIC at 23:26

## 2020-02-04 RX ADMIN — Medication 975 MILLIGRAM(S): at 10:15

## 2020-02-04 RX ADMIN — OXYCODONE HYDROCHLORIDE 5 MILLIGRAM(S): 5 TABLET ORAL at 09:44

## 2020-02-04 RX ADMIN — Medication 975 MILLIGRAM(S): at 09:44

## 2020-02-04 RX ADMIN — OXYCODONE HYDROCHLORIDE 10 MILLIGRAM(S): 5 TABLET ORAL at 00:35

## 2020-02-04 RX ADMIN — POLYETHYLENE GLYCOL 3350 17 GRAM(S): 17 POWDER, FOR SOLUTION ORAL at 12:57

## 2020-02-04 RX ADMIN — BRIMONIDINE TARTRATE 1 DROP(S): 2 SOLUTION/ DROPS OPHTHALMIC at 05:01

## 2020-02-04 RX ADMIN — FAMOTIDINE 20 MILLIGRAM(S): 10 INJECTION INTRAVENOUS at 12:57

## 2020-02-04 RX ADMIN — HYDROMORPHONE HYDROCHLORIDE 0.5 MILLIGRAM(S): 2 INJECTION INTRAMUSCULAR; INTRAVENOUS; SUBCUTANEOUS at 22:38

## 2020-02-04 RX ADMIN — MICAFUNGIN SODIUM 105 MILLIGRAM(S): 100 INJECTION, POWDER, LYOPHILIZED, FOR SOLUTION INTRAVENOUS at 17:57

## 2020-02-04 RX ADMIN — OXYCODONE HYDROCHLORIDE 10 MILLIGRAM(S): 5 TABLET ORAL at 18:25

## 2020-02-04 RX ADMIN — OXYCODONE HYDROCHLORIDE 10 MILLIGRAM(S): 5 TABLET ORAL at 05:30

## 2020-02-04 RX ADMIN — Medication 100 MILLIGRAM(S): at 12:57

## 2020-02-04 RX ADMIN — INSULIN HUMAN 10 UNIT(S): 100 INJECTION, SOLUTION SUBCUTANEOUS at 01:43

## 2020-02-04 RX ADMIN — OXYCODONE HYDROCHLORIDE 10 MILLIGRAM(S): 5 TABLET ORAL at 05:01

## 2020-02-04 RX ADMIN — OXYCODONE HYDROCHLORIDE 5 MILLIGRAM(S): 5 TABLET ORAL at 10:15

## 2020-02-04 RX ADMIN — GABAPENTIN 100 MILLIGRAM(S): 400 CAPSULE ORAL at 05:01

## 2020-02-04 RX ADMIN — CHLORHEXIDINE GLUCONATE 1 APPLICATION(S): 213 SOLUTION TOPICAL at 05:01

## 2020-02-04 RX ADMIN — OXYCODONE HYDROCHLORIDE 10 MILLIGRAM(S): 5 TABLET ORAL at 23:56

## 2020-02-04 RX ADMIN — HYDROMORPHONE HYDROCHLORIDE 0.5 MILLIGRAM(S): 2 INJECTION INTRAMUSCULAR; INTRAVENOUS; SUBCUTANEOUS at 09:57

## 2020-02-04 RX ADMIN — MEROPENEM 100 MILLIGRAM(S): 1 INJECTION INTRAVENOUS at 05:01

## 2020-02-04 RX ADMIN — OXYCODONE HYDROCHLORIDE 10 MILLIGRAM(S): 5 TABLET ORAL at 17:57

## 2020-02-04 RX ADMIN — LATANOPROST 1 DROP(S): 0.05 SOLUTION/ DROPS OPHTHALMIC; TOPICAL at 12:58

## 2020-02-04 RX ADMIN — HYDROMORPHONE HYDROCHLORIDE 0.5 MILLIGRAM(S): 2 INJECTION INTRAMUSCULAR; INTRAVENOUS; SUBCUTANEOUS at 10:15

## 2020-02-04 RX ADMIN — MEROPENEM 100 MILLIGRAM(S): 1 INJECTION INTRAVENOUS at 12:57

## 2020-02-04 NOTE — CHART NOTE - NSCHARTNOTEFT_GEN_A_CORE
Nutrition Follow Up Note    Patient seen for: nutrition follow up and transfer back to 8U    Source: medical record, 8ICU team nga. Pt asleep at time of visit    Chart reviewed, events noted. "49 yr old man with ESRD s/p DDRT in Dec 2019 with complicated post operative course, now s/p graft nephrectomy on 1/10/20 back on hemodialysis off all immunosuppression.  Also has massive PE/DVT on Eliquis now."     Recent events per chart: "S/P AFV cannulation for HD  with infiltration causing extravasation/bleeding to the left upper arm and chest noted on CTA. Left upper cavity arteriography (2/3) demonstrated a tiny pseudoaneurysm arising from a distal branch of the left deep brachial artery. The tiny feeder artery was dissected resulting in resolution of the pseudoaneurysm."    Plan (): IR permacath today; HD today    Diet : NPO with meds (Previously Renal diet with 2 servings Nepro supplement)    Last BM:     Last HD:  (-2L); plan for HD today    Daily Weight in k.2 (-), Weight in k.1 (-), Weight in k.1 (-), Weight in k.4 (-), Weight in k.6 (), Weight in k.6 (), Weight in k.7 (-). Weight gain likely reflects fluid shifts secondary to ESRD on HD.    Drug Dosing Weight  Weight (kg): 63.5 (2020 22:30)  BMI (kg/m2): 19.5 (2020 22:30)      Pertinent Medications: MEDICATIONS  (STANDING):  allopurinol 100 milliGRAM(s) Oral daily  aMIOdarone    Tablet 200 milliGRAM(s) Oral daily  brimonidine 0.2% Ophthalmic Solution 1 Drop(s) Both EYES three times a day  chlorhexidine 2% Cloths 1 Application(s) Topical <User Schedule>  epoetin trey Injectable 64248 Unit(s) IV Push <User Schedule>  famotidine    Tablet 20 milliGRAM(s) Oral daily  gabapentin 100 milliGRAM(s) Oral every 12 hours  latanoprost 0.005% Ophthalmic Solution 1 Drop(s) Both EYES daily  meropenem  IVPB 500 milliGRAM(s) IV Intermittent every 24 hours  micafungin IVPB 100 milliGRAM(s) IV Intermittent every 24 hours  Nephro-candace 1 Tablet(s) Oral daily  polyethylene glycol 3350 17 Gram(s) Oral daily  senna 2 Tablet(s) Oral at bedtime    MEDICATIONS  (PRN):  acetaminophen   Tablet .. 975 milliGRAM(s) Oral every 6 hours PRN Mild Pain (1 - 3)  oxyCODONE    IR 10 milliGRAM(s) Oral every 6 hours PRN Severe Pain (7 - 10)  oxyCODONE    IR 5 milliGRAM(s) Oral every 4 hours PRN Moderate Pain (4 - 6)    LABS:    @ 08:46:  Hemoglobin 7.5<L>, Hematocrit 22.5<L>   @ 02:26: Sodium 132<L>, Potassium 4.8, Chloride 95<L>, Calcium 7.7<L>, BUN 37<H>, Creatinine 7.19<H>, <H>   @ 00:29: Sodium 131<L>, Potassium 5.5<H>, Chloride 96, Calcium 7.9<L>,  Phosphorus 6.0<H>, BUN 36<H>, Creatinine 7.11<H>, BG 92, Alk Phos 186<H>, ALT/SGPT 5<L>, AST/SGOT 18,  Total Protein 5.0<L>, Albumin 1.9<L>,  Total Bilirubin 0.7, Direct Bilirubin 0.3<H>,     Skin per nursing documentation: stage II left buttock (discontinued)  Edema: stage 2 dependent, left arm, right hand    Estimated Needs: based on dosing wt 63.5Kg, with consideration for ESRD on HD (S/P removal of transplanted kidney)  2980-9126 ritika/day (25-30cal/Kg)  76-89 Gm protein/day (1.2-1.4Gm/Kg)    Previous Nutrition Diagnosis: 1) Increased Nutrient Needs 2) Limited adherence to nutrition therapy education  Nutrition Diagnosis is: ongoing, being addressed with therapeutic diet, oral supplements, and nutrition education reinforcement as appropriate    New Nutrition Diagnosis: none     Interventions: resume diet as medically feasible    Recommend  1) Continue Renal diet in-house and upon discharge.  2) Continue 2 servings Nepro supplement to help meet increased protein needs for HD.  3) Continue daily Nephro-candace, renal multivitamin.  4) Continue to reinforce diet education prior to discharge.    Monitoring and Evaluation:     Continue to monitor nutritional intake, tolerance to diet prescription, weights, labs, skin integrity.    RD remains available upon request and will follow up per protocol.    America Anglin MS RD CDN [facility], Pager # [phone].

## 2020-02-04 NOTE — PROGRESS NOTE ADULT - ASSESSMENT
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 RVP with Influenza A s/p Tamiflu treatment course. Now with infected carlos-transplant hematoma.    1/1: s/p Ex-Lap and washout of infected hematoma (Enterobacter from cultures)  1/6: s/p IR guided drainage attempt of hematoma on 1/6 (NGTD from cultures)  1/10: s/p transplant nephrectomy, repair of right external iliac artery with a bovine pericardial patch, repair of right external iliac vein and thrombectomy of right lower extremity veins (Surgical cultures with Enterobacter)  1/15: s/p IR guided drainage (220cc of hematoma)  1/22: s/p IR drain repositioning and upsizing (Gram stain with GNR but so far NGTD)    Some of the Enterobacter isolates R and S to Ertapenem  per core lab Ertapenem at borderline of Susceptible and Resistant breakpoint  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   Given concern for intravascular infection I would treat for 6 weeks from 1/10 nephrectomy (End Date: 2/20/20)  I would continue Meropenem while admitted and switch to Ciprofloxacin on discharge    PEA arrest and Torsades on 1/13  CT C/A/P (1/13) with Saddle PE, interval increase in size of RP fluid collection (infected hematoma)  CT C/A/P (1/21) with pulmonary infarcts and residual hematoma (decreased in size)    Infiltrated LUE AVF with hematoma formation and active extravasation  Underwent IR angiography 9/3 with dissection of feeder artery with resolution of the pseudoaneurysm.    Planned for shiley vs. permacath placement. I believe that rising leukocytosis is secondary to LUE hematoma and recent IR intervention. However, unfortunately, patient also has rise in procalcitonin. Unclear significance of rise in procalcitonin but I would opt to check 2x blood cultures prior to permacath placement (or would proceed with shiley placement)    Overall, LUE Hematoma, Preprocedural Examination, Leukocytosis (increasing), Fever (resolving), Infected Hematoma, Saddle pulmonary embolus, Positive Urine Culture, Renal Transplant Recipient    --Prior to permacath clearance I would obtain 2x blood cultures (or proceed with shiley placement instead)  --Decrease Meropenem to 500 mg IV Q24H (anticipate switch to Cipro PO on discharge - End Date: 2/20/20)  --Would consider discontinuing Micafungin - no evidence of yeast in prior cultures.   --Continue to follow CBC with diff  --Continue to follow temperature curve  --Management of Saddle PE per Transplant and vascular teams    I will continue to follow. Please feel free to contact me with any further questions.    Dada Jacobo M.D.  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 RVP with Influenza A s/p Tamiflu treatment course. Now with infected carlos-transplant hematoma.    1/1: s/p Ex-Lap and washout of infected hematoma (Enterobacter from cultures)  1/6: s/p IR guided drainage attempt of hematoma on 1/6 (NGTD from cultures)  1/10: s/p transplant nephrectomy, repair of right external iliac artery with a bovine pericardial patch, repair of right external iliac vein and thrombectomy of right lower extremity veins (Surgical cultures with Enterobacter)  1/15: s/p IR guided drainage (220cc of hematoma)  1/22: s/p IR drain repositioning and upsizing (Gram stain with GNR but so far NGTD)    Some of the Enterobacter isolates R and S to Ertapenem  per core lab Ertapenem at borderline of Susceptible and Resistant breakpoint  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   Given concern for intravascular infection I would treat for 6 weeks from 1/10 nephrectomy (End Date: 2/20/20)  I would continue Meropenem while admitted and switch to Ciprofloxacin on discharge    PEA arrest and Torsades on 1/13  CT C/A/P (1/13) with Saddle PE, interval increase in size of RP fluid collection (infected hematoma)  CT C/A/P (1/21) with pulmonary infarcts and residual hematoma (decreased in size)    Infiltrated LUE AVF with hematoma formation and active extravasation  Underwent IR angiography 9/3 with dissection of feeder artery with resolution of the pseudoaneurysm.    Planned for shiley vs. permacath placement. I believe that rising leukocytosis is secondary to LUE hematoma and recent IR intervention. However, unfortunately, patient also has rise in procalcitonin. Unclear significance of rise in procalcitonin but I would opt to check 2x blood cultures prior to permacath placement (or would proceed with shiley placement)    Overall, LUE Hematoma, Preprocedural Examination, Leukocytosis (increasing), Infected Hematoma, Saddle pulmonary embolus, Renal Transplant Recipient    --Prior to permacath clearance I would obtain 2x blood cultures (or proceed with shiley placement instead)  --Decrease Meropenem to 500 mg IV Q24H (anticipate switch to Cipro PO on discharge - End Date: 2/20/20)  --Would consider discontinuing Micafungin - no evidence of yeast in prior cultures.   --Continue to follow CBC with diff  --Continue to follow temperature curve  --Management of Saddle PE per Transplant and vascular teams    I will continue to follow. Please feel free to contact me with any further questions.    Dada Jacobo M.D.  [facility] Division of Infectious Disease  8AM-5PM: Pager Number [phone]  After Hours (or if no response): Please contact the Infectious Diseases Office at [phone]     The above assessment and plan were discussed with SICU PA and Transplant Team

## 2020-02-04 NOTE — PROGRESS NOTE ADULT - SUBJECTIVE AND OBJECTIVE BOX
Follow Up:  infected hematoma, leukocytosis    Interval History: afebrile. notes continued LUE pain. notes somewhat improved abdominal pain. no diarrhea.     REVIEW OF SYSTEMS  [  ] ROS unobtainable because:    [ x ] All other systems negative except as noted below    Constitutional:  [ ] fever [ ] chills  [ ] weight loss  [ ] weakness  Skin:  [ ] rash [ ] phlebitis	  Eyes: [ ] icterus [ ] pain  [ ] discharge	  ENMT: [ ] sore throat  [ ] thrush [ ] ulcers [ ] exudates  Respiratory: [ ] dyspnea [ ] hemoptysis [ ] cough [ ] sputum	  Cardiovascular:  [ ] chest pain [ ] palpitations [ ] edema	  Gastrointestinal:  [ ] nausea [ ] vomiting [ ] diarrhea [ ] constipation [ x] pain	  Genitourinary:  [ ] dysuria [ ] frequency [ ] hematuria [ ] discharge [ ] flank pain  [ ] incontinence  Musculoskeletal:  [ ] myalgias [ ] arthralgias [ ] arthritis  [ ] back pain +LUE edema and pain  Neurological:  [ ] headache [ ] seizures  [ ] confusion/altered mental status    Allergies  coconut (Anaphylaxis)  morphine (Pruritus; Rash)        ANTIMICROBIALS:  meropenem  IVPB 500 every 24 hours  micafungin IVPB 100 every 24 hours      OTHER MEDS:  MEDICATIONS  (STANDING):  acetaminophen   Tablet .. 975 every 6 hours PRN  allopurinol 100 daily  aMIOdarone    Tablet 200 daily  epoetin trey Injectable 50058 <User Schedule>  famotidine    Tablet 20 daily  gabapentin 100 every 12 hours  oxyCODONE    IR 10 every 6 hours PRN  oxyCODONE    IR 5 every 4 hours PRN  polyethylene glycol 3350 17 daily  senna 2 at bedtime      Vital Signs Last 24 Hrs  T(C): 36.5 (04 Feb 2020 17:30), Max: 37.2 (04 Feb 2020 07:00)  T(F): 97.7 (04 Feb 2020 17:30), Max: 99 (04 Feb 2020 07:00)  HR: 68 (04 Feb 2020 18:00) (66 - 82)  BP: 117/77 (04 Feb 2020 18:00) (89/65 - 147/68)  BP(mean): 93 (04 Feb 2020 18:00) (64 - 98)  RR: 9 (04 Feb 2020 18:00) (9 - 25)  SpO2: 100% (04 Feb 2020 18:00) (95% - 100%)    PHYSICAL EXAMINATION:  General: Alert and Awake, NAD  HEENT: PERRL, EOMI  Neck: Supple  Cardiac: RRR, No M/R/G  Resp: CTAB, No Wh/Rh/Ra  Abdomen: +RLQ drain with serosanguinous drainage. +RLQ wound vac over transplant nephrectomy site, NBS, mild tenderness to palpation over RLQ  MSK: +LUE edema and pain at proximal arm. 1-2+ RLE edema. No Calf tenderness  Skin: No rashes or lesions. Skin is warm and dry to the touch.   Neuro: Alert and Awake. CN 2-12 Grossly intact. Moves all four extremities spontaneously.  Psych: Calm, Pleasant, Cooperative                          7.5    21.20 )-----------( 428      ( 04 Feb 2020 18:13 )             23.0       02-04    133<L>  |  97  |  38<H>  ----------------------------<  89  5.2   |  24  |  7.62<H>    Ca    7.9<L>      04 Feb 2020 14:58  Phos  5.9     02-04  Mg     2.2     02-04    TPro  5.0<L>  /  Alb  1.9<L>  /  TBili  0.7  /  DBili  0.3<H>  /  AST  18  /  ALT  5<L>  /  AlkPhos  186<H>  02-04          MICROBIOLOGY:  v  .Blood Blood  01-24-20   No growth at 5 days.  --  --      .Body Fluid RLQ Fluid Collection  01-24-20   No growth  --  --      .Blood Blood  01-24-20   No growth at 5 days.  --  --      .Body Fluid Abdominal Fluid  01-22-20   No growth at 5 days  --    polymorphonuclear leukocytes seen  Gram Negative Rods seen  by cytocentrifuge      .Blood Blood  01-21-20   No growth at 5 days.  --  --      .Blood Blood  01-21-20   No growth at 5 days.  --  --      .Sputum Sputum, trap  01-18-20   Normal Respiratory Gillian present  --    No polymorphonuclear leukocytes per low power field  Rare Squamous epithelial cells per low power field  Rare Gram positive cocci in pairs per oil power field      .Blood Blood  01-16-20   No growth at 5 days.  --  --      .Body Fluid Abdominal Fluid  01-15-20   No growth at 5 days  --    polymorphonuclear leukocytes seen  No organisms seen  by cytocentrifuge      .Tissue Other  01-10-20   No growth at 5 days  --  Enterobacter cloacae (Carbapenem Resistant)      .Blood Blood-Venous  01-07-20   No growth at 5 days.  --  --      .Urine Catheterized  01-07-20   <10,000 CFU/mL Normal Urogenital Gillian  --  --      Abdominal Fl Abdominal Fluid  01-06-20   No growth at 5 days  --    No polymorphonuclear leukocytes seen  No organisms seen  by cytocentrifuge    RADIOLOGY:    <The imaging below has been reviewed and visualized by me independently. Findings as detailed in report below>    EXAM:  CT ANGIO UPR EXT (W)AW IC LT                EXAM:  CT ANGIO CHEST (W)AW IC                      PROCEDURE DATE:  02/01/2020    Large left upper extremity hematoma measuring 17.3 x 5.0 cm which extends into the left axilla with active arterial extravasation which persists on more delayed imaging.

## 2020-02-04 NOTE — PROGRESS NOTE ADULT - ASSESSMENT
49M PMHx of ESRD on HD  ( via LUE AVF), HTN, Gout, Glaucoma, NET of pancreas, RA with hand contractures. s/p DDRT 12/8/19,  Post op course c/b DGF requiring HD. Renal bx x 2 c/w profound ATN.     Re-admitted with Influenza and perinephric hematoma. s/p hematoma evacuation/ abd washout and renal biopsy. Post op course c/b leukocytosis, infected hematoma, acute RLE DVT, and bleeding pseudoaneurysm at txp renal artery anastomosis. Now s/p graft nephrectomy. Course further complicated by saddle PE causing right heart strain and hemodynamic instability leading to cardiac arrest requiring ACLS, Required IR drainage of post-op collections x2. Transferred to floor on 1/25/2020.     [] DDRT c/b DGF/ATN/perinephric infected hematoma/pseudoneurysm/graft nephrectomy   - S/P AFV cannulation for HD yesterday with infiltration causing extravasation/bleeding to the left upper arm and chest noted on CTA  - S/P 1 unit PRBC overnight post transfusion H&H 9.5/25.9  - f/u repeat H&H   - LT femoral cortis intact may need to change to [facility] for HD, HD  today per renal   - Eliquis placed on hold  - s/p bedside IR drainage of RLQ fluid collection (1/15), drain repositioned 1/22 and cultures NTD  - FU final IR culture, fungal culture from 1/22- NTD  - Repeat chest/abd/pelvis CT 1/28- noted with continued resolution of right lower quadrant fluid collection. Persistent bilateral lower lobe pulmonary emboli.  - ID following;  cont Meropenem, Micafungin 100mg daily.   ID plan for discharge on cipro.  - pain control  - bowel regimen  -on pepcid.   - PT/OT/OOB.  VAC change per PT  - Keep B/L LE w/ compression stockings. Has gout pain on R knee, avoid covering the R knee. Apply compression stocking on R side up to just below knee level and apply ACE bandage above the knee on the R side  up to mid/upper thigh.       [] Saddle PE, with right heart strain and complicated by PEA arrest followed by Torsade, ROSC achieved after ACLS  - vascular, CT and vascular cardiology following   -Repeat chest CT 1/28 noted Persistent bilateral lower lobe pulmonary emboli.   - CT 1/21: There are pulmonary emboli within the right lower lobar arteries and the left lower lobar arteries extending into the distal segments as seen on prior study. The pulmonary emboli seen in the right and left pulmonary arteries are normal longer visualized. New distal to mid brachial DVT noted  1/19 Duplex: RUE Deep vein thrombosis in the mid to distal brachial vein.  - Eliquis held since yesterday in the setting of AVF bleed   - continue ASA  - repeat ECHO with improved R heart strain    [] New Afib/Vtech  Cardiology following, BB low dose in the setting of WCT and hold BB on HD days  Continue amiodarone, card plan to continue for couple of months and will f/u as outpt    [] New acute DVT of R distal brachia vein   - continue to hold anticoagulation due to active LT AVF bleed    [] New acute DVT of R external iliac, common femoral, femoral veins, R soleal vein   - IVC filter placed 1/9, hold anticoagulation due to active afv bleed     [] Thrombocytosis  - PLT downtrending to 358 was rising PLT counts over the past week.   -Heme consulted; likely reactive. Heme work up in progress    [] Dispo  - continue tele  -discharge planning in progress d/c to acute rehab once medically stable. 49M PMHx of ESRD on HD  ( via LUE AVF), HTN, Gout, Glaucoma, NET of pancreas, RA with hand contractures. s/p DDRT 12/8/19,  Post op course c/b DGF requiring HD. Renal bx x 2 c/w profound ATN.     Re-admitted with Influenza and perinephric hematoma. s/p hematoma evacuation/ abd washout and renal biopsy. Post op course c/b leukocytosis, infected hematoma, acute RLE DVT, and bleeding pseudoaneurysm at txp renal artery anastomosis. Now s/p graft nephrectomy. Course further complicated by saddle PE causing right heart strain and hemodynamic instability leading to cardiac arrest requiring ACLS,  Required IR drainage of  post-op collections x 2  now complicated by bleeding following cannulation of  LUE AVF with findings of a tiny pseudoaneurysm arising from a distal branch of the left deep brachial artery s/p dissection of a tiny feeder artery that resulted in resolution of the pseudoaneurysm.    [] DDRT c/b DGF/ATN/perinephric infected hematoma/pseudoneurysm/graft nephrectomy   - S/P AFV cannulation for HD yesterday c/b infiltration causing extravasation/bleeding to the left upper arm and chest noted on CTA  - S/P IR LUE arteriography showing a tiny pseudoaneurysm arising from a distal branch of the left deep brachial artery s/p dissection of a tiny feeder artery that resulted in resolution of the pseudoaneurysm.  - Daily CBC  - Change Left femoral cordis to Shiley for temporary HD access.  Will need a tunnelled permacath by IR once cleared by ID  - Eliquis/ASA on hold. Will discuss time to resume  - Bedside IR drainage of RLQ fluid collection (1/15), drain repositioned 1/22 and cultures NTD  - ID following;  cont Meropenem, Micafungin 100mg daily.   ID plan for discharge on cipro.  - pain control  - bowel regimen  -on pepcid.   - PT/OT/OOB.  VAC change per PT  - Keep B/L LE w/ compression stockings. Has gout pain on R knee, avoid covering the R knee. Apply compression stocking on R side up to just below knee level and apply ACE bandage above the knee on the R side  up to mid/upper thigh.       [] Saddle PE, with right heart strain and complicated by PEA arrest followed by Torsade, ROSC achieved after ACLS  - vascular, CT and vascular cardiology following   -Repeat chest CT 1/28 noted Persistent bilateral lower lobe pulmonary emboli.   - CT 1/21: There are pulmonary emboli within the right lower lobar arteries and the left lower lobar arteries extending into the distal segments as seen on prior study. The pulmonary emboli seen in the right and left pulmonary arteries are no longer visualized. New distal to mid brachial DVT noted  - 1/19 Duplex: RUE Deep vein thrombosis in the mid to distal brachial vein.  - Eliquis/ASA held 2/1 in the setting of AVF bleed   - repeat ECHO with improved R heart strain    [] New Afib/Vtech  Cardiology following, BB low dose in the setting of WCT and hold BB on HD days  Continue amiodarone, card plan to continue for couple of months and will f/u as outpt    [] New acute DVT of R distal brachia vein   - continue to hold anticoagulation due to active LT AVF bleed    [] New acute DVT of R external iliac, common femoral, femoral veins, R soleal vein   - IVC filter placed 1/9, hold anticoagulation due to active afv bleed     [] Thrombocytosis  - PLT downtrending to 358 was rising PLT counts over the past week.   -Heme consulted; likely reactive. Heme work up in progress    [] Dispo  - continue SICU level of care  -discharge to acute rehab once medically stable.

## 2020-02-04 NOTE — PROGRESS NOTE ADULT - ATTENDING COMMENTS
agree with above  clinically stable and feels better  off anticoagulation for now  dialysis via a newly placed neck line  continue antibiotics

## 2020-02-04 NOTE — CHART NOTE - NSCHARTNOTEFT_GEN_A_CORE
Plastics hand (Gigi Arroyo; [phone]) was called regarding Mr. Prado left hand.  Patient complaining of numbness / tingling since active extravasation noted 2 days ago.  Because symptoms are intermittent, resolving extrav with resolved concerns for active compartment syndrome, and baseline arthritic symptoms,  Dr. Arroyo felt that Mr. Prado' acute complaints are likely due to acute neuropraxia.  Recommendations at this time are to watch and wait.  No active interventions at the moment.  Should any clinical changes occur, Dr. Arroyo should be re-consulted.

## 2020-02-04 NOTE — PROGRESS NOTE ADULT - ASSESSMENT
Sai Prado is a 48 y/o male presenting with influenza A with a hospital course complicated by:  - Infected transplant perinephric hematoma s/p right groin exploration, washout, & biopsy c/b persistent perinephric collection s/p FNA  - Right external iliac & common femoral DVTs s/p IVC filter & thrombectomy of the RLE veins  - Gross hematuria w/ pseudoaneurysm of the right external iliac & transplant renal artery seen on IR angiogram s/p right groin exploration, washout of the RP space, and transplant nephrectomy w/ repair of the right external iliac artery w/ a bovine patch  - Iliac fossa collection s/p IR drainage  - Cardiac arrest secondary to a saddle PE    PLAN:  Neuro: No active issues.   - Pain control Tylenol, oxycodone, gabapentin, dilaudid   - LUE tenderness w/o neurological sx; Forearm soft; and to move wrist / fingers    Resp: saddle PE with pulmonary infarct sequela. Largely resolved on most recent imaging  - No active issues.   - No further anticoagulation per Interventional Radiology recommendations    CV: obstructive shock secondary to PE (resolved), s/p PEA arrest with progression to torsades  - Monitor vital signs  - TTE (2/3) w/ grossly normal RV and LV  - Continue amiodarone prior Torsades  - ASA 81 qD  - Continue metoprolol 12.5 BID.   - On ASA 81.     GI: no acute issues  - Renal diet as tolerated.  - Continue pepcid.   - Bowel regimen with senna & Miralax.  - Continue wound vac.     Renal: CKD stage V s/p DDRT c/b DGF & perinephric collection s/p right groin exploration, washout, & biopsy c/b persistent perinephric collection s/p FNA; gross hematuria w/ pseudoaneurysm of the right external iliac & transplant renal artery s/p right groin exploration, washout of the RP space, & transplant nephrectomy w/ repair of the right external iliac artery w/ a bovine patch; persistent right iliac fossa collection s/p IR drainage  - Planning for IR permcath placement today 2/4  - On nephro-candace.     Heme: anemia of chronic diseases, saddle PE, right external iliac & common femoral DVTs s/p IVC filter  - S/p LUE arteriography with no bleeding vessel identified. Will continue to monitor CBCs.   - Holding Eliquis for now   - Continue Epogen.     ID: Enterobacter cloacae UTI, transplant perinephric collection and right iliac fossa collection with carbapenem resistant Enterobacter cloacae (CRE) and E. coli.  - On 6 week course of Meropenem, micafungin per Transplant, with eventual plans to switch to Per Os Ciprofloxacin on discharge.     Endo: No acute issues  - Monitor glucose on BMP    Misc: Gout, glaucoma  - Home allopurinol for gout  - Home eyedrops (latanoprost, dorzolamide, & brimonidine) for glaucoma    Disposition:  - Surgical Intensive Care Unit

## 2020-02-04 NOTE — PROGRESS NOTE ADULT - ASSESSMENT
49 year old male with ESRD on HD, now s/p ddrt on 12/8/2019 course complicated by TMA/profound ATN, who presents from a HD center with a fever and cough. He was initially admitted with influenza. He is s/p washout of infected collection and biopsy which revealed ATN.  He was found to have diffusely enlarging perinephric fluid collection.    While on telemetry, he had an episode of a wide complex tachycardia. It initially appears irregular, suggesting an atrial arrhythmia with aberrancy, though the remainder appears more like an episode of non-sustained VT.   s/p washout on 1/1/2020 with worsening sepsis and transfer to ICU, s/p IR drainage of perinephric collection on 1/6. 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 He underwent operative repair of right external iliac artery with pericardial patch, transplant nephrectomy, RLE thrombectomy, and IVC filter on 1/9.     s/p corrina ->tachy arrest with tdp, af rvr, now on amio, with large PE    He has now returned to the SICU with significant lue swelling and active arterial extravasation, with drop in blood counts. He is s/p angiogram with tiny pseudoaneurysm.    - remains in sr, despite this  - had developed malignant arrhythmias which seemed to start with bradycardia, leading to more tachycardia and bursts of VT and rapid AF.  The apparent af degenerated into torsades.  He was shocked and has been maintaining SR since. Arrhythmias were most likely triggered by the massive vte event, are unlikely to represent a primary cardiac issue  - cont amio at 200 daily.   - hold metoprolol  - repeat ekg daily to evaluate qtc, while on antifungals/amiodarone  - hold asa     - was on apixaban for his PE  - this has been held in the setting of significant arterial extravasation at his fistula.  - trend CBC. Transfuse to keep hb >8  - cont to hold a/c  - echo with preserved/hyperdynamic lv, with RV failure consistent with acute RV overload.  Repeat echo noted, and seemingly with improved RV function  - Optimized from cardiac POV for IR permacath placement today.  - Further cardiac workup will depend on clinical course.  - will follow with you

## 2020-02-04 NOTE — PROGRESS NOTE ADULT - PROBLEM SELECTOR PLAN 1
Last HD treatment on 1/30/20, with 2L of UF tolerated. Follows with nephrologist, Dr Amaya.  Had dialysis on 2/1/2020 with fistula infiltration/ hematoma and multiple transfusions.  Seen by vascular - no intervention at this time.  Follows with primary nephrologist Dr Amaya for dialysis. plan for perma cath placement by IR today .

## 2020-02-04 NOTE — PROGRESS NOTE ADULT - ATTENDING COMMENTS
Pt seen and examined. Chart reviewed. Resident note confirmed. Pt is a 49 year old male with a medical history signficant for CAD/HTN/cardiac arrest/PE/ESRD/DDRT (s/p explantation) who returned to [facility] with fever/leukocytosis. Work-up revealed a recurrent perinephric hematoma and led to DDRT explantation. Pt developed a DVT and a GFF was placed. Despite this, pt sustained a PE with cardiac arrest. He was anticoagulated and bled again. A/C was once again placed on hold. Pt underwent HD yesterday and bled into his arm. Angiogram was unrevealing. Pt is pending dialysis catheter access for HD.     PMH/PSH/MEDS/ALL/SH/FH/ROS:  Unchanged from H&P  Vitals/PE/Labs/radiographs:  Reviewed      A/p    Neuro:	Post operative pain  	Continue pain control    CVS:	CAD/HTN  	H/o PE with cardiac arrest  	AC remains contraindicated  	Echo reviewed  	Continue asa/metoprolol    Pulm:	Atelectasis  	ISP    GI:	S/p ileus  	Reg diet as tolerated    :	ESRD  	Bleeding complication from AV fistula  	Will need dialysis cath  	VIR follow up today  	  Heme:	Acute blood loss anema  	H/h stable  	Continue to monitor    ID:	Worsening leukocytosis  	Rising procalcitonin  	Suspected infected hematoma  	continue ABX per ID    Endo:	Continue glycemic control .

## 2020-02-04 NOTE — PROGRESS NOTE ADULT - SUBJECTIVE AND OBJECTIVE BOX
[facility] - Melissa Kelsey, Enrique, Maximino, Marin, Ravi Goodson  Office Number:  [phone]    Patient resting comfortably in chair in NAD.  Laying flat with no respiratory distress.  No complaints of chest pain, dyspnea, palpitations, PND, or orthopnea.  He had chest and back pain when they moved him out of bed.      F/U for:  PE    Telemetry: NSR     MEDICATIONS  (STANDING):  allopurinol 100 milliGRAM(s) Oral daily  aMIOdarone    Tablet 200 milliGRAM(s) Oral daily  brimonidine 0.2% Ophthalmic Solution 1 Drop(s) Both EYES three times a day  chlorhexidine 2% Cloths 1 Application(s) Topical <User Schedule>  epoetin trey Injectable 31621 Unit(s) IV Push <User Schedule>  famotidine    Tablet 20 milliGRAM(s) Oral daily  gabapentin 100 milliGRAM(s) Oral every 12 hours  latanoprost 0.005% Ophthalmic Solution 1 Drop(s) Both EYES daily  meropenem  IVPB 500 milliGRAM(s) IV Intermittent every 12 hours  micafungin IVPB 100 milliGRAM(s) IV Intermittent every 24 hours  Nephro-candace 1 Tablet(s) Oral daily  polyethylene glycol 3350 17 Gram(s) Oral daily  senna 2 Tablet(s) Oral at bedtime    MEDICATIONS  (PRN):  acetaminophen   Tablet .. 975 milliGRAM(s) Oral every 6 hours PRN Mild Pain (1 - 3)  oxyCODONE    IR 10 milliGRAM(s) Oral every 6 hours PRN Severe Pain (7 - 10)  oxyCODONE    IR 5 milliGRAM(s) Oral every 4 hours PRN Moderate Pain (4 - 6)      Allergies    coconut (Anaphylaxis)  morphine (Pruritus; Rash)        Vital Signs Last 24 Hrs  T(C): 36.3 (03 Feb 2020 23:00), Max: 37.3 (03 Feb 2020 19:00)  T(F): 97.3 (03 Feb 2020 23:00), Max: 99.1 (03 Feb 2020 19:00)  HR: 82 (04 Feb 2020 06:00) (70 - 84)  BP: 123/60 (04 Feb 2020 06:00) (93/58 - 163/80)  BP(mean): 84 (04 Feb 2020 06:00) (68 - 112)  RR: 18 (04 Feb 2020 06:00) (12 - 20)  SpO2: 99% (04 Feb 2020 06:00) (96% - 100%)    I&O's Summary    02 Feb 2020 07:01  -  03 Feb 2020 07:00  --------------------------------------------------------  IN: 1700 mL / OUT: 90 mL / NET: 1610 mL    03 Feb 2020 07:01  -  04 Feb 2020 06:17  --------------------------------------------------------  IN: 200 mL / OUT: 85 mL / NET: 115 mL        ON EXAM:    Constitutional: NAD, awake    HEENT: Moist Mucous Membranes, Anicteric  Pulmonary: Decreased breath sounds b/l. No rales, crackles or wheeze appreciated.   Cardiovascular: Regular, S1 and S2, No murmurs, rubs, gallops or clicks  Gastrointestinal: Bowel Sounds present, soft, nontender.   Lymph: + peripheral edema, legs wrapped jolynn. No lymphadenopathy.  Skin: No visible rashes or ulcers.  Psych:  Mood & affect appropriate for situation  Significant swelling of his lue    LABS: All Labs Reviewed:                        7.9    20.33 )-----------( 415      ( 04 Feb 2020 00:28 )             23.7                         8.6    18.67 )-----------( 410      ( 03 Feb 2020 15:31 )             25.3                         8.5    19.11 )-----------( 404      ( 03 Feb 2020 11:16 )             25.2     04 Feb 2020 02:26    132    |  95     |  37     ----------------------------<  130    4.8     |  21     |  7.19   04 Feb 2020 00:29    131    |  96     |  36     ----------------------------<  92     5.5     |  20     |  7.11   03 Feb 2020 02:58    131    |  95     |  32     ----------------------------<  110    4.8     |  22     |  6.36     Ca    7.7        04 Feb 2020 02:26  Ca    7.9        04 Feb 2020 00:29  Ca    7.8        03 Feb 2020 02:58  Phos  6.0       04 Feb 2020 00:29  Phos  5.8       03 Feb 2020 02:58  Phos  4.9       02 Feb 2020 03:50  Mg     2.2       03 Feb 2020 02:58  Mg     1.9       02 Feb 2020 03:50  Mg     1.8       01 Feb 2020 23:28    TPro  5.0    /  Alb  1.9    /  TBili  0.7    /  DBili  0.3    /  AST  18     /  ALT  5      /  AlkPhos  186    04 Feb 2020 00:29  TPro  4.5    /  Alb  2.0    /  TBili  0.5    /  DBili  0.3    /  AST  16     /  ALT  5      /  AlkPhos  166    03 Feb 2020 02:58  TPro  4.2    /  Alb  1.6    /  TBili  0.7    /  DBili  0.4    /  AST  16     /  ALT  6      /  AlkPhos  184    02 Feb 2020 03:50    PT/INR - ( 04 Feb 2020 00:28 )   PT: 14.4 sec;   INR: 1.24 ratio         PTT - ( 04 Feb 2020 00:28 )  PTT:34.3 sec  CARDIAC MARKERS ( 02 Feb 2020 17:13 )  x     / x     / 86 U/L / x     / x

## 2020-02-04 NOTE — PROGRESS NOTE ADULT - SUBJECTIVE AND OBJECTIVE BOX
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 Patient was admitted and hospital course is as follows:    12/27 - started on Tamiflu, Duplex of the transplanted kidney demonstrated an increase in the perinephric hematoma from 5.7 cm to 9.4 cm  12/29 - patient reporting dysuria so urine culture sent, which was positive for Enterobacter cloacae  01/01 - s/p right groin exploration, washout of the perinephric hematoma, and biopsy of the transplanted kidney, cultures of the hematoma positive for Enterobacter cloacae & E. coli, biopsy demonstrates  01/05 - patient noted to be febrile and hypotensive with new episodes of wide complex tachycardia on telemetry, labs significant for worsening leukocytosis, cultures sent, CT scan obtained & demonstrated a persistent perinephric collection, admitted to SICU for hemodynamic monitoring  01/06 - FNA w/ IR with aspiration of 18 mL of clear & pale yellow fluid but cultures negative for any growth, patient began having gross hematuria requiring CBI  01/07 - repeat imaging demonstrated no change in the size of the perinephric collection  01/08 - RLE edema was noted so a LE Duplex was obtained, which demonstrated a right external iliac & common femoral vein DVT, vascular surgery consulted, patient started on a heparin infusion  01/10 - patient became altered with worsening hematuria despite CBI, patient was taken to IR for an angiogram, which demonstrated a pseudoaneurysm of the transplant renal artery & external iliac artery anastomosis so the pseudoaneurysm was stented & an IVC filter was placed, patient taken to the OR and is s/p right groin exploration, washout of the RP space, transplant nephrectomy w/ repair of the right external iliac artery w/ a bovine patch & removal of the stent placed by IR, and thrombectomy of the RLE veins, returned to SICU still intubated  01/13 - acutely bradycardic and hypotensive progressing into PEA arrest with progression into torsades requiring ACLS for a total of ~12 mins, imaging revealed saddle PE with TTE demonstrating RV strain, PERT team consulted, management with heparin infusion as patient is not a tPA candidate, did not require vasopressors shortly after ROSC  01/15 - ultrasound with large right iliac fossa collection s/p percutaneous drainage by IR, 220 mL of dark red fluid drained and 10 Fr drain was left in placed  01/16 - extubated  01/19 - RUE Duplex with thrombosis of the brachial & cephalic veins, RLQ ultrasound with interval decrease in right iliac fossa collection  01/21 - CTA chest and CT abdomen & pelvis obtained demonstrating known RLL and LLL lobar artery PEs but resolution of PE in bilateral pulmonary arteries, new BLL wedge-like opacities likely representing pulmonary infarcts, and interval decrease in RLQ fluid collection  01/22 - RLQ IR drain upsized by IR from 10 Fr to 12 Fr  01/23 - Changed heparin infusion to Eliquis, stable for transfer to floor on 1/25.   1/23-2/1: No acute issues, rehab planning. However on 2/1 while undergoing  hemodialysis through L AVF, had massive infiltration, transferred to Surgical Intensive Care Unit for q1h neurovascular checks with concern for upper arm compartment syndrome  2/2-2/3: IR LUE angiogram, tiny PSA from distal branches of deep brachial artery noted and embolized, no venous injury noted at AVF      24 HOUR EVENTS:  - TTE obtained to reassess R heart strain, incomplete study by grossly normal RV and LV  - No transfusions required for the past 24hrs  - Planning for IR permcat 2/4    SUBJECTIVE/ROS:  [x] A ten-point review of systems was otherwise negative except as noted.  [ ] Due to altered mental status/intubation, subjective information were not able to be obtained from the patient. History was obtained, to the extent possible, from review of the chart and collateral sources of information.      NEURO  Exam: awake, alert, oriented  Meds: acetaminophen   Tablet .. 975 milliGRAM(s) Oral every 6 hours PRN Mild Pain (1 - 3)  gabapentin 100 milliGRAM(s) Oral every 12 hours  oxyCODONE    IR 10 milliGRAM(s) Oral every 6 hours PRN Severe Pain (7 - 10)  oxyCODONE    IR 5 milliGRAM(s) Oral every 4 hours PRN Moderate Pain (4 - 6)    [x] Adequacy of sedation and pain control has been assessed and adjusted      RESPIRATORY  RR: 19 (02-04-20 @ 00:00) (0 - 20)  SpO2: 97% (02-04-20 @ 00:00) (97% - 100%)  Wt(kg): --  Exam: unlabored, clear to auscultation bilaterally  Mechanical Ventilation:     [N/A] Extubation Readiness Assessed  Meds:       CARDIOVASCULAR  HR: 80 (02-04-20 @ 00:00) (69 - 84)  BP: 122/58 (02-04-20 @ 00:00) (111/63 - 163/80)  BP(mean): 83 (02-04-20 @ 00:00) (82 - 115)  ABP: --  ABP(mean): --  Wt(kg): --  CVP(cm H2O): --  VBG - ( 02 Feb 2020 12:08 )  pH: 7.40  /  pCO2: 43    /  pO2: 36    / HCO3: 26    / Base Excess: 1.8   /  SaO2: 69     Lactate: 1.1                Exam: regular rate and rhythm  Cardiac Rhythm: sinus  Perfusion     [x]Adequate   [ ]Inadequate  Mentation   [x]Normal       [ ]Reduced  Extremities  [x]Warm         [ ]Cool  Volume Status [ ]Hypervolemic [x]Euvolemic [ ]Hypovolemic  Meds: aMIOdarone    Tablet 200 milliGRAM(s) Oral daily        GI/NUTRITION  Exam: soft, nontender, nondistended, incision C/D/I  Diet:  Meds: famotidine    Tablet 20 milliGRAM(s) Oral daily  polyethylene glycol 3350 17 Gram(s) Oral daily  senna 2 Tablet(s) Oral at bedtime      GENITOURINARY  I&O's Detail    02-02 @ 07:01  -  02-03 @ 07:00  --------------------------------------------------------  IN:    Packed Red Blood Cells: 1000 mL    Platelets - Single Donor: 350 mL    Solution: 150 mL    Solution: 100 mL    Solution: 100 mL  Total IN: 1700 mL    OUT:    Bulb: 90 mL  Total OUT: 90 mL    Total NET: 1610 mL      02-03 @ 07:01  -  02-04 @ 00:20  --------------------------------------------------------  IN:    Solution: 100 mL    Solution: 50 mL  Total IN: 150 mL    OUT:    Bulb: 25 mL    VAC (Vacuum Assisted Closure) System: 50 mL  Total OUT: 75 mL    Total NET: 75 mL          02-03    131<L>  |  95<L>  |  32<H>  ----------------------------<  110<H>  4.8   |  22  |  6.36<H>    Ca    7.8<L>      03 Feb 2020 02:58  Phos  5.8     02-03  Mg     2.2     02-03    TPro  4.5<L>  /  Alb  2.0<L>  /  TBili  0.5  /  DBili  0.3<H>  /  AST  16  /  ALT  5<L>  /  AlkPhos  166<H>  02-03    [ ] Kennedy catheter, indication: N/A  Meds: Nephro-candace 1 Tablet(s) Oral daily        HEMATOLOGIC  Meds:   [x] VTE Prophylaxis                        8.6    18.67 )-----------( 410      ( 03 Feb 2020 15:31 )             25.3     PT/INR - ( 03 Feb 2020 02:58 )   PT: 14.8 sec;   INR: 1.28 ratio         PTT - ( 03 Feb 2020 02:58 )  PTT:35.0 sec  Transfusion     [ ] PRBC   [ ] Platelets   [ ] FFP   [ ] Cryoprecipitate      INFECTIOUS DISEASES  WBC Count: 18.67 K/uL (02-03 @ 15:31)  WBC Count: 19.11 K/uL (02-03 @ 11:16)  WBC Count: 17.96 K/uL (02-03 @ 06:11)  WBC Count: 17.83 K/uL (02-03 @ 02:58)    RECENT CULTURES:    Meds: epoetin trey Injectable 43664 Unit(s) IV Push <User Schedule>  meropenem  IVPB 500 milliGRAM(s) IV Intermittent every 12 hours  micafungin IVPB 100 milliGRAM(s) IV Intermittent every 24 hours        ENDOCRINE  CAPILLARY BLOOD GLUCOSE        Meds: allopurinol 100 milliGRAM(s) Oral daily        ACCESS DEVICES:  [ ] Peripheral IV  [ ] Central Venous Line	[ ] R	[ ] L	[ ] IJ	[ ] Fem	[ ] SC	Placed:   [ ] Arterial Line		[ ] R	[ ] L	[ ] Fem	[ ] Rad	[ ] Ax	Placed:   [ ] PICC:					[ ] Mediport  [ ] Urinary Catheter, Date Placed:   [x] Necessity of urinary, arterial, and venous catheters discussed    OTHER MEDICATIONS:  brimonidine 0.2% Ophthalmic Solution 1 Drop(s) Both EYES three times a day  chlorhexidine 2% Cloths 1 Application(s) Topical <User Schedule>  latanoprost 0.005% Ophthalmic Solution 1 Drop(s) Both EYES daily      CODE STATUS:      IMAGING: SICU DAILY PROGRESS NOTE    48 y/o male with a PMHx of HTN, gout, glaucoma, RA, 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 Patient was admitted and hospital course is as follows:    12/27 - started on Tamiflu, Duplex of the transplanted kidney demonstrated an increase in the perinephric hematoma from 5.7 cm to 9.4 cm  12/29 - patient reporting dysuria so urine culture sent, which was positive for Enterobacter cloacae  01/01 - s/p right groin exploration, washout of the perinephric hematoma, and biopsy of the transplanted kidney, cultures of the hematoma positive for Enterobacter cloacae & E. coli, biopsy demonstrates  01/05 - patient noted to be febrile and hypotensive with new episodes of wide complex tachycardia on telemetry, labs significant for worsening leukocytosis, cultures sent, CT scan obtained & demonstrated a persistent perinephric collection, admitted to SICU for hemodynamic monitoring  01/06 - FNA w/ IR with aspiration of 18 mL of clear & pale yellow fluid but cultures negative for any growth, patient began having gross hematuria requiring CBI  01/07 - repeat imaging demonstrated no change in the size of the perinephric collection  01/08 - RLE edema was noted so a LE Duplex was obtained, which demonstrated a right external iliac & common femoral vein DVT, vascular surgery consulted, patient started on a heparin infusion  01/10 - patient became altered with worsening hematuria despite CBI, patient was taken to IR for an angiogram, which demonstrated a pseudoaneurysm of the transplant renal artery & external iliac artery anastomosis so the pseudoaneurysm was stented & an IVC filter was placed, patient taken to the OR and is s/p right groin exploration, washout of the RP space, transplant nephrectomy w/ repair of the right external iliac artery w/ a bovine patch & removal of the stent placed by IR, and thrombectomy of the RLE veins, returned to SICU still intubated  01/13 - acutely bradycardic and hypotensive progressing into PEA arrest with progression into torsades requiring ACLS for a total of ~12 mins, imaging revealed saddle PE with TTE demonstrating RV strain, PERT team consulted, management with heparin infusion as patient is not a tPA candidate, did not require vasopressors shortly after ROSC  01/15 - ultrasound with large right iliac fossa collection s/p percutaneous drainage by IR, 220 mL of dark red fluid drained and 10 Fr drain was left in placed  01/16 - extubated  01/19 - RUE Duplex with thrombosis of the brachial & cephalic veins, RLQ ultrasound with interval decrease in right iliac fossa collection  01/21 - CTA chest and CT abdomen & pelvis obtained demonstrating known RLL and LLL lobar artery PEs but resolution of PE in bilateral pulmonary arteries, new BLL wedge-like opacities likely representing pulmonary infarcts, and interval decrease in RLQ fluid collection  01/22 - RLQ IR drain upsized by IR from 10 Fr to 12 Fr  01/23 - Changed heparin infusion to Eliquis, stable for transfer to floor on 1/25.   1/23-2/1: No acute issues, rehab planning. However on 2/1 while undergoing  hemodialysis through L AVF, had massive infiltration, transferred to Surgical Intensive Care Unit for q1h neurovascular checks with concern for upper arm compartment syndrome  2/2-2/3: IR LUE angiogram, tiny PSA from distal branches of deep brachial artery noted and embolized, no venous injury noted at AVF      24 HOUR EVENTS:  - TTE obtained to reassess R heart strain, incomplete study by grossly normal RV and LV  - No transfusions required for the past 24hrs  - Planning for IR permcat 2/4    SUBJECTIVE/ROS:  [x] A ten-point review of systems was otherwise negative except as noted.  [ ] Due to altered mental status/intubation, subjective information were not able to be obtained from the patient. History was obtained, to the extent possible, from review of the chart and collateral sources of information.      NEURO  Exam: awake, alert, oriented  Meds: acetaminophen   Tablet .. 975 milliGRAM(s) Oral every 6 hours PRN Mild Pain (1 - 3)  gabapentin 100 milliGRAM(s) Oral every 12 hours  oxyCODONE    IR 10 milliGRAM(s) Oral every 6 hours PRN Severe Pain (7 - 10)  oxyCODONE    IR 5 milliGRAM(s) Oral every 4 hours PRN Moderate Pain (4 - 6)    [x] Adequacy of sedation and pain control has been assessed and adjusted      RESPIRATORY  RR: 19 (02-04-20 @ 00:00) (0 - 20)  SpO2: 97% (02-04-20 @ 00:00) (97% - 100%)  Wt(kg): --  Exam: unlabored, clear to auscultation bilaterally  Mechanical Ventilation:     [N/A] Extubation Readiness Assessed  Meds:       CARDIOVASCULAR  HR: 80 (02-04-20 @ 00:00) (69 - 84)  BP: 122/58 (02-04-20 @ 00:00) (111/63 - 163/80)  BP(mean): 83 (02-04-20 @ 00:00) (82 - 115)  ABP: --  ABP(mean): --  Wt(kg): --  CVP(cm H2O): --  VBG - ( 02 Feb 2020 12:08 )  pH: 7.40  /  pCO2: 43    /  pO2: 36    / HCO3: 26    / Base Excess: 1.8   /  SaO2: 69     Lactate: 1.1      Exam: regular rate and rhythm  Cardiac Rhythm: sinus  Perfusion     [x]Adequate   [ ]Inadequate  Mentation   [x]Normal       [ ]Reduced  Extremities  [x]Warm         [ ]Cool  Volume Status [ ]Hypervolemic [x]Euvolemic [ ]Hypovolemic  Meds: aMIOdarone    Tablet 200 milliGRAM(s) Oral daily      GI/NUTRITION  Exam: soft, nontender, nondistended, incision C/D/I  Diet: renal restriction diet  Meds: famotidine    Tablet 20 milliGRAM(s) Oral daily  polyethylene glycol 3350 17 Gram(s) Oral daily  senna 2 Tablet(s) Oral at bedtime      GENITOURINARY  I&O's Detail    02-02 @ 07:01  -  02-03 @ 07:00  --------------------------------------------------------  IN:    Packed Red Blood Cells: 1000 mL    Platelets - Single Donor: 350 mL    Solution: 150 mL    Solution: 100 mL    Solution: 100 mL  Total IN: 1700 mL    OUT:    Bulb: 90 mL  Total OUT: 90 mL    Total NET: 1610 mL      02-03 @ 07:01  -  02-04 @ 00:20  --------------------------------------------------------  IN:    Solution: 100 mL    Solution: 50 mL  Total IN: 150 mL    OUT:    Bulb: 25 mL    VAC (Vacuum Assisted Closure) System: 50 mL  Total OUT: 75 mL    Total NET: 75 mL          02-03    131<L>  |  95<L>  |  32<H>  ----------------------------<  110<H>  4.8   |  22  |  6.36<H>    Ca    7.8<L>      03 Feb 2020 02:58  Phos  5.8     02-03  Mg     2.2     02-03    TPro  4.5<L>  /  Alb  2.0<L>  /  TBili  0.5  /  DBili  0.3<H>  /  AST  16  /  ALT  5<L>  /  AlkPhos  166<H>  02-03    [ ] Kennedy catheter, indication: N/A  Meds: Nephro-candace 1 Tablet(s) Oral daily        HEMATOLOGIC  Meds:   [x] VTE Prophylaxis                        8.6    18.67 )-----------( 410      ( 03 Feb 2020 15:31 )             25.3     PT/INR - ( 03 Feb 2020 02:58 )   PT: 14.8 sec;   INR: 1.28 ratio         PTT - ( 03 Feb 2020 02:58 )  PTT:35.0 sec  Transfusion     [ ] PRBC   [ ] Platelets   [ ] FFP   [ ] Cryoprecipitate      INFECTIOUS DISEASES  WBC Count: 18.67 K/uL (02-03 @ 15:31)  WBC Count: 19.11 K/uL (02-03 @ 11:16)  WBC Count: 17.96 K/uL (02-03 @ 06:11)  WBC Count: 17.83 K/uL (02-03 @ 02:58)    RECENT CULTURES:    Meds: epoetin trey Injectable 40936 Unit(s) IV Push <User Schedule>  meropenem  IVPB 500 milliGRAM(s) IV Intermittent every 12 hours  micafungin IVPB 100 milliGRAM(s) IV Intermittent every 24 hours      Meds: allopurinol 100 milliGRAM(s) Oral daily      ACCESS DEVICES:  [x] Peripheral IV  [ ] Central Venous Line	[ ] R	[ ] L	[ ] IJ	[ ] Fem	[ ] SC	Placed:   [ ] Arterial Line		[ ] R	[ ] L	[ ] Fem	[ ] Rad	[ ] Ax	Placed:   [ ] PICC:					[ ] Mediport  [ ] Urinary Catheter, Date Placed:   [x] Necessity of urinary, arterial, and venous catheters discussed    OTHER MEDICATIONS:  brimonidine 0.2% Ophthalmic Solution 1 Drop(s) Both EYES three times a day  chlorhexidine 2% Cloths 1 Application(s) Topical <User Schedule>  latanoprost 0.005% Ophthalmic Solution 1 Drop(s) Both EYES daily      CODE STATUS: full SICU DAILY PROGRESS NOTE    48 y/o male with a PMHx of HTN, gout, glaucoma, RA, 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 Patient was admitted and hospital course is as follows:    12/27 - started on Tamiflu, Duplex of the transplanted kidney demonstrated an increase in the perinephric hematoma from 5.7 cm to 9.4 cm  12/29 - patient reporting dysuria so urine culture sent, which was positive for Enterobacter cloacae  01/01 - s/p right groin exploration, washout of the perinephric hematoma, and biopsy of the transplanted kidney, cultures of the hematoma positive for Enterobacter cloacae & E. coli, biopsy demonstrates  01/05 - patient noted to be febrile and hypotensive with new episodes of wide complex tachycardia on telemetry, labs significant for worsening leukocytosis, cultures sent, CT scan obtained & demonstrated a persistent perinephric collection, admitted to SICU for hemodynamic monitoring  01/06 - FNA w/ IR with aspiration of 18 mL of clear & pale yellow fluid but cultures negative for any growth, patient began having gross hematuria requiring CBI  01/07 - repeat imaging demonstrated no change in the size of the perinephric collection  01/08 - RLE edema was noted so a LE Duplex was obtained, which demonstrated a right external iliac & common femoral vein DVT, vascular surgery consulted, patient started on a heparin infusion  01/10 - patient became altered with worsening hematuria despite CBI, patient was taken to IR for an angiogram, which demonstrated a pseudoaneurysm of the transplant renal artery & external iliac artery anastomosis so the pseudoaneurysm was stented & an IVC filter was placed, patient taken to the OR and is s/p right groin exploration, washout of the RP space, transplant nephrectomy w/ repair of the right external iliac artery w/ a bovine patch & removal of the stent placed by IR, and thrombectomy of the RLE veins, returned to SICU still intubated  01/13 - acutely bradycardic and hypotensive progressing into PEA arrest with progression into torsades requiring ACLS for a total of ~12 mins, imaging revealed saddle PE with TTE demonstrating RV strain, PERT team consulted, management with heparin infusion as patient is not a tPA candidate, did not require vasopressors shortly after ROSC  01/15 - ultrasound with large right iliac fossa collection s/p percutaneous drainage by IR, 220 mL of dark red fluid drained and 10 Fr drain was left in placed  01/16 - extubated  01/19 - RUE Duplex with thrombosis of the brachial & cephalic veins, RLQ ultrasound with interval decrease in right iliac fossa collection  01/21 - CTA chest and CT abdomen & pelvis obtained demonstrating known RLL and LLL lobar artery PEs but resolution of PE in bilateral pulmonary arteries, new BLL wedge-like opacities likely representing pulmonary infarcts, and interval decrease in RLQ fluid collection  01/22 - RLQ IR drain upsized by IR from 10 Fr to 12 Fr  01/23 - Changed heparin infusion to Eliquis, stable for transfer to floor on 1/25.   1/23-2/1: No acute issues, rehab planning. However on 2/1 while undergoing  hemodialysis through L AVF, had massive infiltration, transferred to Surgical Intensive Care Unit for q1h neurovascular checks with concern for upper arm compartment syndrome  2/2-2/3: IR LUE angiogram, tiny PSA from distal branches of deep brachial artery noted and embolized, no venous injury noted at AVF      24 HOUR EVENTS:  - TTE obtained to reassess R heart strain, incomplete study by grossly normal RV and LV  - No transfusions required for the past 24hrs  - WBC trending up, H/h trending down  - Planning for IR permcath 2/4    SUBJECTIVE/ROS:  [x] A ten-point review of systems was otherwise negative except as noted.  [ ] Due to altered mental status/intubation, subjective information were not able to be obtained from the patient. History was obtained, to the extent possible, from review of the chart and collateral sources of information.      NEURO  Exam: awake, alert, oriented  Meds: acetaminophen   Tablet .. 975 milliGRAM(s) Oral every 6 hours PRN Mild Pain (1 - 3)  gabapentin 100 milliGRAM(s) Oral every 12 hours  oxyCODONE    IR 10 milliGRAM(s) Oral every 6 hours PRN Severe Pain (7 - 10)  oxyCODONE    IR 5 milliGRAM(s) Oral every 4 hours PRN Moderate Pain (4 - 6)    [x] Adequacy of sedation and pain control has been assessed and adjusted      RESPIRATORY  RR: 19 (02-04-20 @ 00:00) (0 - 20)  SpO2: 97% (02-04-20 @ 00:00) (97% - 100%)  Wt(kg): --  Exam: unlabored, clear to auscultation bilaterally  Mechanical Ventilation:     [N/A] Extubation Readiness Assessed  Meds:       CARDIOVASCULAR  HR: 80 (02-04-20 @ 00:00) (69 - 84)  BP: 122/58 (02-04-20 @ 00:00) (111/63 - 163/80)  BP(mean): 83 (02-04-20 @ 00:00) (82 - 115)  ABP: --  ABP(mean): --  Wt(kg): --  CVP(cm H2O): --  VBG - ( 02 Feb 2020 12:08 )  pH: 7.40  /  pCO2: 43    /  pO2: 36    / HCO3: 26    / Base Excess: 1.8   /  SaO2: 69     Lactate: 1.1      Exam: regular rate and rhythm  Cardiac Rhythm: sinus  Perfusion     [x]Adequate   [ ]Inadequate  Mentation   [x]Normal       [ ]Reduced  Extremities  [x]Warm         [ ]Cool  Volume Status [ ]Hypervolemic [x]Euvolemic [ ]Hypovolemic  Meds: aMIOdarone    Tablet 200 milliGRAM(s) Oral daily      GI/NUTRITION  Exam: soft, nontender, nondistended, incision C/D/I  Diet: renal restriction diet  Meds: famotidine    Tablet 20 milliGRAM(s) Oral daily  polyethylene glycol 3350 17 Gram(s) Oral daily  senna 2 Tablet(s) Oral at bedtime      GENITOURINARY  I&O's Detail    02-02 @ 07:01  -  02-03 @ 07:00  --------------------------------------------------------  IN:    Packed Red Blood Cells: 1000 mL    Platelets - Single Donor: 350 mL    Solution: 150 mL    Solution: 100 mL    Solution: 100 mL  Total IN: 1700 mL    OUT:    Bulb: 90 mL  Total OUT: 90 mL    Total NET: 1610 mL      02-03 @ 07:01  -  02-04 @ 00:20  --------------------------------------------------------  IN:    Solution: 100 mL    Solution: 50 mL  Total IN: 150 mL    OUT:    Bulb: 25 mL    VAC (Vacuum Assisted Closure) System: 50 mL  Total OUT: 75 mL    Total NET: 75 mL          02-03    131<L>  |  95<L>  |  32<H>  ----------------------------<  110<H>  4.8   |  22  |  6.36<H>    Ca    7.8<L>      03 Feb 2020 02:58  Phos  5.8     02-03  Mg     2.2     02-03    TPro  4.5<L>  /  Alb  2.0<L>  /  TBili  0.5  /  DBili  0.3<H>  /  AST  16  /  ALT  5<L>  /  AlkPhos  166<H>  02-03    [ ] Kennedy catheter, indication: N/A  Meds: Nephro-candace 1 Tablet(s) Oral daily        HEMATOLOGIC  Meds:   [x] VTE Prophylaxis                        8.6    18.67 )-----------( 410      ( 03 Feb 2020 15:31 )             25.3     PT/INR - ( 03 Feb 2020 02:58 )   PT: 14.8 sec;   INR: 1.28 ratio         PTT - ( 03 Feb 2020 02:58 )  PTT:35.0 sec  Transfusion     [ ] PRBC   [ ] Platelets   [ ] FFP   [ ] Cryoprecipitate      INFECTIOUS DISEASES  WBC Count: 18.67 K/uL (02-03 @ 15:31)  WBC Count: 19.11 K/uL (02-03 @ 11:16)  WBC Count: 17.96 K/uL (02-03 @ 06:11)  WBC Count: 17.83 K/uL (02-03 @ 02:58)    RECENT CULTURES:    Meds: epoetin trey Injectable 85769 Unit(s) IV Push <User Schedule>  meropenem  IVPB 500 milliGRAM(s) IV Intermittent every 12 hours  micafungin IVPB 100 milliGRAM(s) IV Intermittent every 24 hours      Meds: allopurinol 100 milliGRAM(s) Oral daily      ACCESS DEVICES:  [x] Peripheral IV  [ ] Central Venous Line	[ ] R	[ ] L	[ ] IJ	[ ] Fem	[ ] SC	Placed:   [ ] Arterial Line		[ ] R	[ ] L	[ ] Fem	[ ] Rad	[ ] Ax	Placed:   [ ] PICC:					[ ] Mediport  [ ] Urinary Catheter, Date Placed:   [x] Necessity of urinary, arterial, and venous catheters discussed    OTHER MEDICATIONS:  brimonidine 0.2% Ophthalmic Solution 1 Drop(s) Both EYES three times a day  chlorhexidine 2% Cloths 1 Application(s) Topical <User Schedule>  latanoprost 0.005% Ophthalmic Solution 1 Drop(s) Both EYES daily      CODE STATUS: full SICU DAILY PROGRESS NOTE    48 y/o male with a PMHx of HTN, gout, glaucoma, RA, 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 Patient was admitted and hospital course is as follows:    12/27 - started on Tamiflu, Duplex of the transplanted kidney demonstrated an increase in the perinephric hematoma from 5.7 cm to 9.4 cm  12/29 - patient reporting dysuria so urine culture sent, which was positive for Enterobacter cloacae  01/01 - s/p right groin exploration, washout of the perinephric hematoma, and biopsy of the transplanted kidney, cultures of the hematoma positive for Enterobacter cloacae & E. coli, biopsy demonstrates  01/05 - patient noted to be febrile and hypotensive with new episodes of wide complex tachycardia on telemetry, labs significant for worsening leukocytosis, cultures sent, CT scan obtained & demonstrated a persistent perinephric collection, admitted to SICU for hemodynamic monitoring  01/06 - FNA w/ IR with aspiration of 18 mL of clear & pale yellow fluid but cultures negative for any growth, patient began having gross hematuria requiring CBI  01/07 - repeat imaging demonstrated no change in the size of the perinephric collection  01/08 - RLE edema was noted so a LE Duplex was obtained, which demonstrated a right external iliac & common femoral vein DVT, vascular surgery consulted, patient started on a heparin infusion  01/10 - patient became altered with worsening hematuria despite CBI, patient was taken to IR for an angiogram, which demonstrated a pseudoaneurysm of the transplant renal artery & external iliac artery anastomosis so the pseudoaneurysm was stented & an IVC filter was placed, patient taken to the OR and is s/p right groin exploration, washout of the RP space, transplant nephrectomy w/ repair of the right external iliac artery w/ a bovine patch & removal of the stent placed by IR, and thrombectomy of the RLE veins, returned to SICU still intubated  01/13 - acutely bradycardic and hypotensive progressing into PEA arrest with progression into torsades requiring ACLS for a total of ~12 mins, imaging revealed saddle PE with TTE demonstrating RV strain, PERT team consulted, management with heparin infusion as patient is not a tPA candidate, did not require vasopressors shortly after ROSC  01/15 - ultrasound with large right iliac fossa collection s/p percutaneous drainage by IR, 220 mL of dark red fluid drained and 10 Fr drain was left in placed  01/16 - extubated  01/19 - RUE Duplex with thrombosis of the brachial & cephalic veins, RLQ ultrasound with interval decrease in right iliac fossa collection  01/21 - CTA chest and CT abdomen & pelvis obtained demonstrating known RLL and LLL lobar artery PEs but resolution of PE in bilateral pulmonary arteries, new BLL wedge-like opacities likely representing pulmonary infarcts, and interval decrease in RLQ fluid collection  01/22 - RLQ IR drain upsized by IR from 10 Fr to 12 Fr  01/23 - Changed heparin infusion to Eliquis, stable for transfer to floor on 1/25.   1/23-2/1: No acute issues, rehab planning. However on 2/1 while undergoing  hemodialysis through L AVF, had massive infiltration, transferred to Surgical Intensive Care Unit for q1h neurovascular checks with concern for upper arm compartment syndrome  2/2-2/3: IR LUE angiogram, tiny PSA from distal branches of deep brachial artery noted and embolized, no venous injury noted at AVF      24 HOUR EVENTS:  - TTE obtained to reassess R heart strain, incomplete study by grossly normal RV and LV  - No transfusions required for the past 24hrs  - WBC trending up, H/h trending down  - overnight hyperkalemic to 5.5, shifted once  - Planning for IR permcath 2/4    SUBJECTIVE/ROS:  [x] A ten-point review of systems was otherwise negative except as noted.  [ ] Due to altered mental status/intubation, subjective information were not able to be obtained from the patient. History was obtained, to the extent possible, from review of the chart and collateral sources of information.      NEURO  Exam: awake, alert, oriented  Meds: acetaminophen   Tablet .. 975 milliGRAM(s) Oral every 6 hours PRN Mild Pain (1 - 3)  gabapentin 100 milliGRAM(s) Oral every 12 hours  oxyCODONE    IR 10 milliGRAM(s) Oral every 6 hours PRN Severe Pain (7 - 10)  oxyCODONE    IR 5 milliGRAM(s) Oral every 4 hours PRN Moderate Pain (4 - 6)    [x] Adequacy of sedation and pain control has been assessed and adjusted      RESPIRATORY  RR: 19 (02-04-20 @ 00:00) (0 - 20)  SpO2: 97% (02-04-20 @ 00:00) (97% - 100%)  Wt(kg): --  Exam: unlabored, clear to auscultation bilaterally  Mechanical Ventilation:     [N/A] Extubation Readiness Assessed  Meds:       CARDIOVASCULAR  HR: 80 (02-04-20 @ 00:00) (69 - 84)  BP: 122/58 (02-04-20 @ 00:00) (111/63 - 163/80)  BP(mean): 83 (02-04-20 @ 00:00) (82 - 115)  ABP: --  ABP(mean): --  Wt(kg): --  CVP(cm H2O): --  VBG - ( 02 Feb 2020 12:08 )  pH: 7.40  /  pCO2: 43    /  pO2: 36    / HCO3: 26    / Base Excess: 1.8   /  SaO2: 69     Lactate: 1.1      Exam: regular rate and rhythm  Cardiac Rhythm: sinus  Perfusion     [x]Adequate   [ ]Inadequate  Mentation   [x]Normal       [ ]Reduced  Extremities  [x]Warm         [ ]Cool  Volume Status [ ]Hypervolemic [x]Euvolemic [ ]Hypovolemic  Meds: aMIOdarone    Tablet 200 milliGRAM(s) Oral daily      GI/NUTRITION  Exam: soft, nontender, nondistended, incision C/D/I  Diet: renal restriction diet  Meds: famotidine    Tablet 20 milliGRAM(s) Oral daily  polyethylene glycol 3350 17 Gram(s) Oral daily  senna 2 Tablet(s) Oral at bedtime      GENITOURINARY  I&O's Detail    02-02 @ 07:01  -  02-03 @ 07:00  --------------------------------------------------------  IN:    Packed Red Blood Cells: 1000 mL    Platelets - Single Donor: 350 mL    Solution: 150 mL    Solution: 100 mL    Solution: 100 mL  Total IN: 1700 mL    OUT:    Bulb: 90 mL  Total OUT: 90 mL    Total NET: 1610 mL      02-03 @ 07:01  -  02-04 @ 00:20  --------------------------------------------------------  IN:    Solution: 100 mL    Solution: 50 mL  Total IN: 150 mL    OUT:    Bulb: 25 mL    VAC (Vacuum Assisted Closure) System: 50 mL  Total OUT: 75 mL    Total NET: 75 mL          02-03    131<L>  |  95<L>  |  32<H>  ----------------------------<  110<H>  4.8   |  22  |  6.36<H>    Ca    7.8<L>      03 Feb 2020 02:58  Phos  5.8     02-03  Mg     2.2     02-03    TPro  4.5<L>  /  Alb  2.0<L>  /  TBili  0.5  /  DBili  0.3<H>  /  AST  16  /  ALT  5<L>  /  AlkPhos  166<H>  02-03    [ ] Kennedy catheter, indication: N/A  Meds: Nephro-candace 1 Tablet(s) Oral daily        HEMATOLOGIC  Meds:   [x] VTE Prophylaxis                        8.6    18.67 )-----------( 410      ( 03 Feb 2020 15:31 )             25.3     PT/INR - ( 03 Feb 2020 02:58 )   PT: 14.8 sec;   INR: 1.28 ratio         PTT - ( 03 Feb 2020 02:58 )  PTT:35.0 sec  Transfusion     [ ] PRBC   [ ] Platelets   [ ] FFP   [ ] Cryoprecipitate      INFECTIOUS DISEASES  WBC Count: 18.67 K/uL (02-03 @ 15:31)  WBC Count: 19.11 K/uL (02-03 @ 11:16)  WBC Count: 17.96 K/uL (02-03 @ 06:11)  WBC Count: 17.83 K/uL (02-03 @ 02:58)    RECENT CULTURES:    Meds: epoetin trey Injectable 34748 Unit(s) IV Push <User Schedule>  meropenem  IVPB 500 milliGRAM(s) IV Intermittent every 12 hours  micafungin IVPB 100 milliGRAM(s) IV Intermittent every 24 hours      Meds: allopurinol 100 milliGRAM(s) Oral daily      ACCESS DEVICES:  [x] Peripheral IV  [ ] Central Venous Line	[ ] R	[ ] L	[ ] IJ	[ ] Fem	[ ] SC	Placed:   [ ] Arterial Line		[ ] R	[ ] L	[ ] Fem	[ ] Rad	[ ] Ax	Placed:   [ ] PICC:					[ ] Mediport  [ ] Urinary Catheter, Date Placed:   [x] Necessity of urinary, arterial, and venous catheters discussed    OTHER MEDICATIONS:  brimonidine 0.2% Ophthalmic Solution 1 Drop(s) Both EYES three times a day  chlorhexidine 2% Cloths 1 Application(s) Topical <User Schedule>  latanoprost 0.005% Ophthalmic Solution 1 Drop(s) Both EYES daily      CODE STATUS: full

## 2020-02-04 NOTE — PROGRESS NOTE ADULT - ASSESSMENT
50 y/o male presenting with influenza A with a hospital course complicated by Infected transplant perinephric hematoma s/p right groin exploration, washout, & biopsy c/b persistent perinephric collection s/p FNA, Right external iliac & common femoral DVTs s/p IVC filter & thrombectomy of the RLE veins, Gross hematuria w/ pseudoaneurysm of the right external iliac & transplant renal artery seen on IR angiogram s/p right groin exploration, washout of the RP space, and transplant nephrectomy w/ repair of the right external iliac artery w/ a bovine patch, Iliac fossa collection s/p IR drainage, Cardiac arrest secondary to a saddle PE, now with L infiltration with likely small amount of ongoing active bleeding with large LUE hematoma while patient was on therapeutic anticoagulation.    Plan:  - continue to hold anticoagulation   - IR today for permacath vs shiley  - no vascular surgery intervention at this time  - continue to monitor h&h  - continue with compression and elevation  - continue to monitor neurovascular exam. no neurovascular deficits noted at this time   - continue  care per sicu / transplant      VASCULAR [phone]

## 2020-02-04 NOTE — PROGRESS NOTE ADULT - SUBJECTIVE AND OBJECTIVE BOX
PT EVALUATION     07/30/19 1525   Pain Assessment   Pain Assessment 0-10   Pain Score 7  (L knee pain from fall as per patient)   Home Living   Type of [address] [facility] [address] level;Stairs to enter with rails  (6 stairs to enter)   Home Equipment Walker;Cane   Additional Comments patient independent without assistive device prior to admission   Prior Function   Level of [place] Independent with ADLs and functional mobility   Lives With Alone   ADL Assistance Independent   IADLs Independent   Falls in the last 6 months 1 to 4   Comments patient cares for small children at times and is independent   Restrictions/Precautions   Other Precautions Fall Risk;Pain   General   Additional Pertinent History chart reviewed, patient admitted with syncope, SEBASTIAN  patient independent prior to admission and now requiring assist at times due to L knee discomfort from fall with syncope   Family/Caregiver Present No   Cognition   Overall Cognitive Status WFL   Arousal/Participation Cooperative   Attention Within functional limits   Orientation Level Oriented X4   Following Commands Follows all commands and directions without difficulty   RLE Assessment   RLE Assessment   (ROM WFL, strength 4/5)   LLE Assessment   LLE Assessment   (ROM WFL, strength 3+/4- with discomfort in L knee)   Coordination   Movements are Fluid and Coordinated 0   Transfers   Sit to Stand 5  Supervision   Stand to Sit 5  Supervision   Ambulation/Elevation   Gait Assistance 4  Minimal assist   Additional items Assist x 1;Verbal cues; Tactile cues   Assistive Device   (hand hold assist)   Distance 15 feet with antalgic gait due to L knee pain    Education completed in benefits of use of cane and patient agreed and has a cane in her home as needed for gait safety   Balance   Static Sitting Good   Dynamic Sitting Fair +   Static Standing Fair   Dynamic Standing Fair   Ambulatory Fair -   Activity Tolerance   Activity Tolerance Patient limited by [facility] NEPHROLOGY SERVICES, [facility]  NEPHROLOGY AND HYPERTENSION  [address]  [address]  [place] [address]  [phone]    MD GIANA GUERRERO, MD JANAE MCDONNELL, MD BARBARA MONET, MD SALUD GARZA, MD CATRINA WOO MD    Awake, no distress    Vital Signs Last 24 Hrs  T(C): 36.5 (02-04-20 @ 17:30), Max: 37.3 (02-03-20 @ 19:00)  T(F): 97.7 (02-04-20 @ 17:30), Max: 99.1 (02-03-20 @ 19:00)  HR: 72 (02-04-20 @ 17:30) (66 - 84)  BP: 114/70 (02-04-20 @ 17:30) (89/65 - 147/68)  BP(mean): 82 (02-04-20 @ 17:30) (64 - 98)  RR: 9 (02-04-20 @ 17:30) (9 - 25)  SpO2: 100% (02-04-20 @ 17:30) (95% - 100%)    Respiratory: decreased BS at bases  Cardiovascular: S1 S2  Gastrointestinal: soft NT ND +BS  Extremities:  LUE wrapped ; + RUE, b/l LE edema    acetaminophen   Tablet .. 975 milliGRAM(s) Oral every 6 hours PRN  allopurinol 100 milliGRAM(s) Oral daily  aMIOdarone    Tablet 200 milliGRAM(s) Oral daily  brimonidine 0.2% Ophthalmic Solution 1 Drop(s) Both EYES three times a day  chlorhexidine 2% Cloths 1 Application(s) Topical <User Schedule>  chlorhexidine 4% Liquid 1 Application(s) Topical <User Schedule>  epoetin trey Injectable 35033 Unit(s) IV Push <User Schedule>  famotidine    Tablet 20 milliGRAM(s) Oral daily  gabapentin 100 milliGRAM(s) Oral every 12 hours  latanoprost 0.005% Ophthalmic Solution 1 Drop(s) Both EYES daily  meropenem  IVPB 500 milliGRAM(s) IV Intermittent every 24 hours  micafungin IVPB 100 milliGRAM(s) IV Intermittent every 24 hours  Nephro-candace 1 Tablet(s) Oral daily  oxyCODONE    IR 10 milliGRAM(s) Oral every 6 hours PRN  oxyCODONE    IR 5 milliGRAM(s) Oral every 4 hours PRN  polyethylene glycol 3350 17 Gram(s) Oral daily  senna 2 Tablet(s) Oral at bedtime  sodium chloride 0.9% lock flush 10 milliLiter(s) IV Push every 1 hour PRN                        7.5    22.00 )-----------( 411      ( 04 Feb 2020 08:46 )             22.5     04 Feb 2020 14:58    133    |  97     |  38     ----------------------------<  89     5.2     |  24     |  7.62     Ca    7.9        04 Feb 2020 14:58  Phos  5.9       04 Feb 2020 14:58  Mg     2.2       04 Feb 2020 14:58    TPro  5.0    /  Alb  1.9    /  TBili  0.7    /  DBili  0.3    /  AST  18     /  ALT  5      /  AlkPhos  186    04 Feb 2020 00:29    LIVER FUNCTIONS - ( 04 Feb 2020 00:29 )  Alb: 1.9 g/dL / Pro: 5.0 g/dL / ALK PHOS: 186 U/L / ALT: 5 U/L / AST: 18 U/L / GGT: x           PT/INR - ( 04 Feb 2020 00:28 )   PT: 14.4 sec;   INR: 1.24 ratio      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, DGF  S/p transplant kidney bx 12/13: ATN, focal TMA  S/p PLEX 12/13, 12/15; d/c-d 12/20 w/op HD 3 x wk  Re-adm 12/27 w/Flu A, infected carlos-nephric hematoma   S/p OR 1/1 for washout and repeat renal graft biopsy (ATN)  Dx w/RLE DVT  Developed gross hematuria  S/p OR 1/10 for infected carlos-nephric hematoma, hemorrhage involving pseudo-aneurysm of anastomosis of renal artery to external iliac vein,   s/p transplant nephrectomy, s/p IVCF   S/p PEA arrest 1/13, dx w/PE, R retroperitoneal collection  S/p IR drainage of abd collection  RUE DVT, on AC  Abx per ID  AVF infiltration extending to chest 2/1 while on Eliquis  W/up, role for AC per Heme/Onc  Last HD 1/30  HD today once access is placed  F/u Hgb  D/w SICU team    [phone] fatigue;Patient limited by pain   Nurse Made Aware yes   Assessment   Prognosis Good   Problem List Decreased strength;Decreased range of motion;Decreased endurance; Impaired balance;Decreased mobility; Decreased coordination;Pain   Assessment Patient seen for Physical Therapy evaluation  Patient admitted with Acute renal failure superimposed on stage 3 chronic kidney disease ([facility] [address] )  Comorbidities affecting patient's physical performance include:HTN, T2DM on insulin, HDL, PAF on eliquis, morbid obeisty,  who presents with today with a syncopal event  Personal factors affecting patient at time of initial evaluation include: stairs to enter home, inability to ambulate household distances, inability to navigate community distances, inability to navigate level surfaces without external assistance, inability to perform dynamic tasks in community, limited home support, positive fall history, inability to perform caregiver support/tasks, inability to perform physical activity, inability to perform ADLS and inability to perform IADLS   Prior to admission, patient was independent with functional mobility without assistive device, independent with ADLS, independent with IADLS, ambulating household distance, ambulating community distances and works part time  Please find objective findings from Physical Therapy assessment regarding body systems outlined above with impairments and limitations including weakness, decreased ROM, impaired balance, decreased endurance, impaired coordination, gait deviations, pain, decreased activity tolerance, decreased functional mobility tolerance and fall risk  The Barthel Index was used as a functional outcome tool presenting with a score of 55 today indicating marked limitations of functional mobility and ADLS    Patient's clinical presentation is currently unstable/unpredictable as seen in patient's presentation of vital sign response, changing level of pain, increased fall risk, new onset of impairment of functional mobility, decreased endurance and new onset of weakness  Pt would benefit from continued Physical Therapy treatment to address deficits as defined above and maximize level of functional mobility  As demonstrated by objective findings, the assigned level of complexity for this evaluation is high  Goals   Patient Goals decrease L knee pain and feel better   STG Expiration Date 08/06/19   Short Term Goal #1 transfers and gait with cane indepedently   Short Term Goal #2 gait endurance to functional household distances, strength LLE 4-/5 all to meet patient goal of less pain and returning home   LTG Expiration Date 08/13/19   Long Term Goal #1 transfers and gait independently without assistive device   Long Term Goal #2 gait endurance to functional community distances   Plan   Treatment/Interventions ADL retraining;Functional transfer training;LE strengthening/ROM; Therapeutic exercise; Endurance training;Patient/family training;Equipment eval/education; Bed mobility;Gait training; Compensatory technique education   PT Frequency 5x/wk   Recommendation   Recommendation Outpatient PT  (for L knee care as needed)   Barthel Index   Feeding 10   Bathing 0   Grooming Score 0   Dressing Score 5   Bladder Score 10   Bowels Score 10   Toilet Use Score 5   Transfers (Bed/Chair) Score 10   Mobility (Level Surface) Score 0   Stairs Score 5   Barthel Index Score 54   Licensure   [place] License Number  Stanley Richard PT [id number]

## 2020-02-04 NOTE — PROGRESS NOTE ADULT - SUBJECTIVE AND OBJECTIVE BOX
Transplant Surgery - Multidisciplinary Rounds  --------------------------------------------------------------  R DDRT    Date:  12/8/19     Present: Patient seen with multidisciplinary team including Transplant Surgeon: Dr. Cruz. Transplant Nephrologist: Dr. Puckett. NP/PA Dino Aviles in am rounds and examined with Dr. Cruz.  Disciplines not in attendance will be notified of the plan.     HPI: 49M PMHx of ESRD on HD  ( via LUE AVF), HTN, Gout, Glaucoma, NET of pancreas (s/p robotic distal panc/splenectomy at [place] 2015 by Dr. Young, followed by Dr. Raphael, [facility] Oncologist), RA with hand contractures. He underwent DDRT on 12/8/19. Post op course c/b DGF requiring HD, thrombocytopenia, elevated LDH and haptoglobin. Schistocytes  on peripheral smear concerning for TMA. Envarsus held. Received Belatacept 12/13. Started on PLEX (12/12, 12/15). Underwent renal bx on 12/13 c/w profound ATN.  Discharged home on 12/20/19 w/ outpatient HD.     Re-admitted 12/27 with influenza, completed treatement with Tamiflu on 12/31. Urine cx on admission grew Ent cloacae, was started on meropenem 12/31. Renal US on admission with increasing hematoma.   ·	OR 1/1 for ex-lap, hematoma evacuation, washout and renal bx. (c/w ATN)  OR cxs grew CRE and e coli. Post op course c/b:   ·	fevers and leukocytosis, CT a/p (1/4) with increased perinephric collection. Underwent IR guided collection 1/6   ·	New onset hematuria, martinez inserted 1/7, started CBI  ·	RLE edema, RLE doppler (1/8) with acute above the knee thrombus of R external iliac, common femoral and femoral veins, Acute calf vein thrombus affecting R soleal vein, Heparin gtt initiated   ·	AMS 1/9  (head CT neg), hyponatremic  ·	Significant hematuria and bleeding around the martinez. Angio 1/9 showed actively bleeding pseudoaneurysm at the anastomosis of the transplant renal artery with the recipient iliac artery. A stent was placed in the iliac artery to  control hemorrhage. IVC filter placed.   ·	Emergent OR 1/9-1/10 for graft nephrectomy;  ureter to bladder anastomosis was completely disrupted, repaired the R external iliac artery with bovine pericardial patch, repaired right external iliac vein, performed thrombectomy of RLE veins, Intra op received 3u PRBC.   ·	Extubated 1/11  ·	1/13 Saddle PE, with right heart strain and complicated by PEA arrest followed by Torsade, ROSC achieved after ACLS; re-intubated  ·	1/15 Renal US with 13.7 x 5.0 x 1.5 cm in RLQ transplant bed; s/p bedside IR guided drainage, pigtail in place. Fluid cx to date with NG  ·	1/21 CT C/A/P showing persistent collection, catheter at superior portion of collection  ·	1/22 IR pigtail drain repositioned and upsized to 12Fr catheter w/ increased output  ·	1/23 Transitioned to Eliquis from Heparin drip  ·	2/1  AVF infiltrated/ w/ - arterial extrav/ bleeding  CW hematoma     Interval events:    - Afebrile, stable vitals.  - S/P AFV cannulation for HD 2/1 with infiltration causing extravasation/bleeding to the left upper arm and chest noted on CTA   - S/p left upper extremity arteriogram with IR. No actively bleeding vessel was identified    - Left femoral cortis placed by ICU team for access  - Eliquis placed on hold  - LE w/ minimal improvement in edema.   - Plts trending down  Heme following   - Hemolysis work-up in progress, will f/u heme recs.  - On meropenem and micafungin.       cxs:   12/29: Urine Cx: Enterobacter Cloacae  1/1 OR Perinephric collection Cx:  Carbapenem resistant Ent Cloacae, E. Coli  1/1: R kidney abscess swab: Enterobacter Cloacae  1/1  OR tissue: Enterobacter Cloacae  1/5: Urine Cx: Enteropbacter Cloacae  1/5: Skin incision Cx (bedside): NG  1/6: Adominal fluid Cx from IR:  NG   1/7: Urine Cx:  NG  1/7: BCX2:  NG  1/10 retroperit hematoma - enerobacter   1/10 bladder hematoma - enterobacter  1/15 Drainage fluid - NGTD  1/16: BC X2: NGTD  1/18: SPutum: Normal harvey  1/21: BC X2: NTGD  1/22: IR body fluid Cx: GNR pre-milian    Potential Discharge date: pending clinical improvement        MEDICATIONS  (STANDING):  allopurinol 100 milliGRAM(s) Oral daily  aMIOdarone    Tablet 200 milliGRAM(s) Oral daily  brimonidine 0.2% Ophthalmic Solution 1 Drop(s) Both EYES three times a day  chlorhexidine 2% Cloths 1 Application(s) Topical <User Schedule>  chlorhexidine 4% Liquid 1 Application(s) Topical <User Schedule>  epoetin trey Injectable 53045 Unit(s) IV Push <User Schedule>  famotidine    Tablet 20 milliGRAM(s) Oral daily  gabapentin 100 milliGRAM(s) Oral every 12 hours  latanoprost 0.005% Ophthalmic Solution 1 Drop(s) Both EYES daily  meropenem  IVPB 500 milliGRAM(s) IV Intermittent every 12 hours  micafungin IVPB 100 milliGRAM(s) IV Intermittent every 24 hours  Nephro-candace 1 Tablet(s) Oral daily    MEDICATIONS  (PRN):  acetaminophen   Tablet .. 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  Carpal tunnel syndrome  Brain cyst: had MRI recently- now gone  Anemia  Gastric ulcer: Long time ago  Rheumatoid arthritis  Renal transplant recipient  Breakdown (mechanical) of penile (implanted) prosthesis, sequela  End-stage renal disease (ESRD): PERMACATH PLACEMENT  Abscess of left buttock: s/p I &amp; D  AV fistula: placement left lower arm  S/P cystoscopy: stent placement &amp; removal for kidney stones, lithiotripsy  s/p Lt Carpal tunnel: 2011      MEDICATIONS  (STANDING):  allopurinol 100 milliGRAM(s) Oral daily  aMIOdarone    Tablet 200 milliGRAM(s) Oral daily  brimonidine 0.2% Ophthalmic Solution 1 Drop(s) Both EYES three times a day  chlorhexidine 2% Cloths 1 Application(s) Topical <User Schedule>  chlorhexidine 4% Liquid 1 Application(s) Topical <User Schedule>  epoetin trey Injectable 31462 Unit(s) IV Push <User Schedule>  famotidine    Tablet 20 milliGRAM(s) Oral daily  gabapentin 100 milliGRAM(s) Oral every 12 hours  latanoprost 0.005% Ophthalmic Solution 1 Drop(s) Both EYES daily  meropenem  IVPB 500 milliGRAM(s) IV Intermittent every 12 hours  micafungin IVPB 100 milliGRAM(s) IV Intermittent every 24 hours  Nephro-candace 1 Tablet(s) Oral daily    MEDICATIONS  (PRN):  acetaminophen   Tablet .. 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  Carpal tunnel syndrome  Brain cyst: had MRI recently- now gone  Anemia  Gastric ulcer: Long time ago  Rheumatoid arthritis  Renal transplant recipient  Breakdown (mechanical) of penile (implanted) prosthesis, sequela  End-stage renal disease (ESRD): PERMACATH PLACEMENT  Abscess of left buttock: s/p I &amp; D  AV fistula: placement left lower arm  S/P cystoscopy: stent placement &amp; removal for kidney stones, lithiotripsy  s/p Lt Carpal tunnel: 2011      Vital Signs Last 24 Hrs  T(C): 36.5 (03 Feb 2020 11:00), Max: 37.2 (03 Feb 2020 07:00)  T(F): 97.7 (03 Feb 2020 11:00), Max: 99 (03 Feb 2020 07:00)  HR: 72 (03 Feb 2020 12:00) (68 - 75)  BP: 133/68 (03 Feb 2020 12:00) (109/57 - 158/86)  BP(mean): 96 (03 Feb 2020 12:00) (79 - 115)  RR: 13 (03 Feb 2020 12:00) (0 - 20)  SpO2: 100% (03 Feb 2020 12:00) (97% - 100%)    I&O's Summary    02 Feb 2020 07:01  -  03 Feb 2020 07:00  --------------------------------------------------------  IN: 1700 mL / OUT: 90 mL / NET: 1610 mL          LABS:                        8.5    19.11 )-----------( 404      ( 03 Feb 2020 11:16 )             25.2     02-03    131<L>  |  95<L>  |  32<H>  ----------------------------<  110<H>  4.8   |  22  |  6.36<H>    Ca    7.8<L>      03 Feb 2020 02:58  Phos  5.8     02-03  Mg     2.2     02-03    TPro  4.5<L>  /  Alb  2.0<L>  /  TBili  0.5  /  DBili  0.3<H>  /  AST  16  /  ALT  5<L>  /  AlkPhos  166<H>  02-03    PT/INR - ( 03 Feb 2020 02:58 )   PT: 14.8 sec;   INR: 1.28 ratio         PTT - ( 03 Feb 2020 02:58 )  PTT:35.0 sec    CAPILLARY BLOOD GLUCOSE        LIVER FUNCTIONS - ( 03 Feb 2020 02:58 )  Alb: 2.0 g/dL / Pro: 4.5 g/dL / ALK PHOS: 166 U/L / ALT: 5 U/L / AST: 16 U/L / GGT: x                 Cultures:    REVIEW OF SYSTEMS  Gen: + weakness   Skin: No rashes  Head/Eyes/Ears/Mouth: No headache; Normal hearing; Normal vision w/o blurriness; No sinus pain/discomfort, sore throat  Respiratory: no SOB  CV: No chest pain, PND, orthopnea  GI: incisional tenderness   : anuric  MSK: LUE+left chest with +3 edema firm to touch, + lower extremity edema, RUE edema, flank edema  Neuro: No dizziness/lightheadedness, weakness, seizures, numbness, tingling  Heme: No easy bruising or bleeding  Endo: No heat/cold intolerance  Psych: No significant nervousness, anxiety, stress, depression      PHYSICAL EXAM:    Constitutional: NAD  	Eyes: Anicteric, PERRLA  	Respiratory: CTA b/l  	Cardiovascular: RRR  	Gastrointestinal: Soft abdomen, appropriate incisional TTP, ND.  wound vac ss/murky output (wound seen during recent wound vac change, good granulation tissue developing), RLQ drain with SS drainage    Genitourinary:  anuric, large amount scrotal edema     Extremities: LUE+left chest with +3 edema firm to touch, b/l 2-3+ lower ext edema ace wrapped ( improved since yesterday); removed ACE wrap this AM and applied compression stockings; no ulcerations or skin breakdown, flank edema, RUE edema slow improvement   	Vascular: DP signal bilateral, L AVF palpable  	Neurological: A&O x3.  	Skin: no new lesions/ulcerations  	Musculoskeletal: Moving all extremities Transplant Surgery - Multidisciplinary Rounds  --------------------------------------------------------------  R DDRT    Date:  12/8/19     Present: Patient seen with multidisciplinary team including Transplant Surgeon: Dr. Cruz. Transplant Nephrologist: Dr. Puckett. NP/PA Dino Aviles in am rounds and examined with Dr. Cruz.  Disciplines not in attendance will be notified of the plan.     HPI: 49M PMHx of ESRD on HD  ( via LUE AVF), HTN, Gout, Glaucoma, NET of pancreas (s/p robotic distal panc/splenectomy at [place] 2015 by Dr. Young, followed by Dr. Raphael, [facility] Oncologist), RA with hand contractures. He underwent DDRT on 12/8/19. Post op course c/b DGF requiring HD, thrombocytopenia, elevated LDH and haptoglobin. Schistocytes  on peripheral smear concerning for TMA. Envarsus held. Received Belatacept 12/13. Started on PLEX (12/12, 12/15). Underwent renal bx on 12/13 c/w profound ATN.  Discharged home on 12/20/19 w/ outpatient HD.     Re-admitted 12/27 with influenza, completed treatement with Tamiflu on 12/31. Urine cx on admission grew Ent cloacae, was started on meropenem 12/31. Renal US on admission with increasing hematoma.   ·	OR 1/1 for ex-lap, hematoma evacuation, washout and renal bx. (c/w ATN)  OR cxs grew CRE and e coli. Post op course c/b:   ·	fevers and leukocytosis, CT a/p (1/4) with increased perinephric collection. Underwent IR guided collection 1/6   ·	New onset hematuria, martinez inserted 1/7, started CBI  ·	RLE edema, RLE doppler (1/8) with acute above the knee thrombus of R external iliac, common femoral and femoral veins, Acute calf vein thrombus affecting R soleal vein, Heparin gtt initiated   ·	AMS 1/9  (head CT neg), hyponatremic  ·	Significant hematuria and bleeding around the martinez. Angio 1/9 showed actively bleeding pseudoaneurysm at the anastomosis of the transplant renal artery with the recipient iliac artery. A stent was placed in the iliac artery to  control hemorrhage. IVC filter placed.   ·	Emergent OR 1/9-1/10 for graft nephrectomy;  ureter to bladder anastomosis was completely disrupted, repaired the R external iliac artery with bovine pericardial patch, repaired right external iliac vein, performed thrombectomy of RLE veins, Intra op received 3u PRBC.   ·	Extubated 1/11  ·	1/13 Saddle PE, with right heart strain and complicated by PEA arrest followed by Torsade, ROSC achieved after ACLS; re-intubated  ·	1/15 Renal US with 13.7 x 5.0 x 1.5 cm in RLQ transplant bed; s/p bedside IR guided drainage, pigtail in place. Fluid cx to date with NG  ·	1/21 CT C/A/P showing persistent collection, catheter at superior portion of collection  ·	1/22 IR pigtail drain repositioned and upsized to 12Fr catheter w/ increased output  ·	1/23 Transitioned to Eliquis from Heparin drip  ·	2/1  AVF infiltrated/ w/ - arterial extrav/ bleeding  CW hematoma     Interval events:    - S/P AFV cannulation for HD 2/1 with infiltration causing extravasation/bleeding to the left upper arm and chest noted on CTA  - 2/3: Left upper cavity arteriography demonstrated a tiny pseudoaneurysm arising from a distal branch of the left deep brachial artery. The tiny feeder artery was dissected resulting in resolution of the pseudoaneurysm.  - 1 unit  PRBC given (5U PRBC total)  - Eliquis still Held  - LUE edema with tenderness under axilla along rib cage and across left chest  - SCr 7.19, K 4.8,  anuric  - Still with LE edema  - TMax 37.3. WBC 20.3 from 17.  On meropenem and micafungin.       cxs:   12/29: Urine Cx: Enterobacter Cloacae  1/1 OR Perinephric collection Cx:  Carbapenem resistant Ent Cloacae, E. Coli  1/1: R kidney abscess swab: Enterobacter Cloacae  1/1  OR tissue: Enterobacter Cloacae  1/5: Urine Cx: Enteropbacter Cloacae  1/5: Skin incision Cx (bedside): NG  1/6: Adominal fluid Cx from IR:  NG   1/7: Urine Cx:  NG  1/7: BCX2:  NG  1/10 retroperit hematoma - enerobacter   1/10 bladder hematoma - enterobacter  1/15 Drainage fluid - NGTD  1/16: BC X2: NGTD  1/18: SPutum: Normal harvey  1/21: BC X2: NTGD  1/22: IR body fluid Cx: GNR pre-milian    Potential Discharge date: pending clinical improvement          MEDICATIONS  (STANDING):  allopurinol 100 milliGRAM(s) Oral daily  aMIOdarone    Tablet 200 milliGRAM(s) Oral daily  brimonidine 0.2% Ophthalmic Solution 1 Drop(s) Both EYES three times a day  chlorhexidine 2% Cloths 1 Application(s) Topical <User Schedule>  epoetin trey Injectable 61884 Unit(s) IV Push <User Schedule>  famotidine    Tablet 20 milliGRAM(s) Oral daily  gabapentin 100 milliGRAM(s) Oral every 12 hours  latanoprost 0.005% Ophthalmic Solution 1 Drop(s) Both EYES daily  meropenem  IVPB 500 milliGRAM(s) IV Intermittent every 24 hours  micafungin IVPB 100 milliGRAM(s) IV Intermittent every 24 hours  Nephro-candace 1 Tablet(s) Oral daily  polyethylene glycol 3350 17 Gram(s) Oral daily  senna 2 Tablet(s) Oral at bedtime    MEDICATIONS  (PRN):  acetaminophen   Tablet .. 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  Carpal tunnel syndrome  Brain cyst: had MRI recently- now gone  Anemia  Gastric ulcer: Long time ago  Rheumatoid arthritis  Renal transplant recipient  Breakdown (mechanical) of penile (implanted) prosthesis, sequela  End-stage renal disease (ESRD): PERMACATH PLACEMENT  Abscess of left buttock: s/p I &amp; D  AV fistula: placement left lower arm  S/P cystoscopy: stent placement &amp; removal for kidney stones, lithiotripsy  s/p Lt Carpal tunnel: 2011      Vital Signs Last 24 Hrs  T(C): 36.8 (04 Feb 2020 11:00), Max: 37.3 (03 Feb 2020 19:00)  T(F): 98.3 (04 Feb 2020 11:00), Max: 99.1 (03 Feb 2020 19:00)  HR: 73 (04 Feb 2020 11:00) (72 - 84)  BP: 90/50 (04 Feb 2020 11:00) (90/50 - 163/80)  BP(mean): 64 (04 Feb 2020 11:00) (64 - 112)  RR: 12 (04 Feb 2020 11:00) (12 - 25)  SpO2: 95% (04 Feb 2020 11:00) (95% - 100%)    I&O's Summary    03 Feb 2020 07:01  -  04 Feb 2020 07:00  --------------------------------------------------------  IN: 200 mL / OUT: 85 mL / NET: 115 mL                              7.5    22.00 )-----------( 411      ( 04 Feb 2020 08:46 )             22.5     02-04    132<L>  |  95<L>  |  37<H>  ----------------------------<  130<H>  4.8   |  21<L>  |  7.19<H>    Ca    7.7<L>      04 Feb 2020 02:26  Phos  6.0     02-04  Mg     2.2     02-03    TPro  5.0<L>  /  Alb  1.9<L>  /  TBili  0.7  /  DBili  0.3<H>  /  AST  18  /  ALT  5<L>  /  AlkPhos  186<H>  02-04       EXAM:  IR PROCEDURE NON PICC                        PROCEDURE DATE:  02/03/2020    INTERPRETATION:  Procedure: Ultrasound and fluoroscopic guided left upper extremity angiography. Images saved to PACS.   Impression:   -Left upper cavity arteriography demonstrated a tiny pseudoaneurysm arising from a distal branch of the left deep brachial artery.   -The tiny feeder artery was dissected resulting in resolution of the pseudoaneurysm.      REVIEW OF SYSTEMS  Gen: + weakness   Skin: No rashes  Head/Eyes/Ears/Mouth: No headache; Normal hearing; Normal vision w/o blurriness; No sinus pain/discomfort, sore throat  Respiratory: no SOB  CV: No chest pain, PND, orthopnea  GI: incisional tenderness   : anuric  MSK: c/o LUE pain and tenderness  Neuro: No dizziness/lightheadedness, weakness, seizures, numbness, tingling  Heme: No easy bruising or bleeding  Endo: No heat/cold intolerance  Psych: No significant nervousness, anxiety, stress, depression      PHYSICAL EXAM:    Constitutional: NAD  	Eyes: Anicteric, PERRLA  	Respiratory: CTA b/l  	Cardiovascular: RRR  	Gastrointestinal: Soft abdomen, appropriate incisional TTP, ND.  wound vac ss/murky output, RLQ drain with SS drainage    Genitourinary:  anuric, large amount scrotal edema     Extremities: LUE and left chest with +3 edema firm and tender to touch, b/l 2-3+ lower ext edema with TEDS stockings, + flank edema, RUE edema improving   	Vascular: DP signal bilateral, L AVF palpable  	Neurological: A&O x3.  	Skin: no new lesions/ulcerations  	Musculoskeletal: Moving all extremities

## 2020-02-04 NOTE — PROGRESS NOTE ADULT - SUBJECTIVE AND OBJECTIVE BOX
VASCULAR SURGERY DAILY PROGRESS NOTE:       SUBJECTIVE/ROS: Patient seen and examined at bedside. Patient reports sub optimal pain control in his LUE. H&H 8.6/25.3 --> 7.9/23.7.    24 HOUR EVENTS:  - TTE obtained to reassess R heart strain, incomplete study by grossly normal RV and LV  - No transfusions required for the past 24hrs  - WBC trending up, H/h trending down  - overnight hyperkalemic to 5.5, shifted once  - Planning for IR permcath 2/4    MEDICATIONS  (STANDING):  allopurinol 100 milliGRAM(s) Oral daily  aMIOdarone    Tablet 200 milliGRAM(s) Oral daily  brimonidine 0.2% Ophthalmic Solution 1 Drop(s) Both EYES three times a day  chlorhexidine 2% Cloths 1 Application(s) Topical <User Schedule>  epoetin trey Injectable 94490 Unit(s) IV Push <User Schedule>  famotidine    Tablet 20 milliGRAM(s) Oral daily  gabapentin 100 milliGRAM(s) Oral every 12 hours  latanoprost 0.005% Ophthalmic Solution 1 Drop(s) Both EYES daily  meropenem  IVPB 500 milliGRAM(s) IV Intermittent every 12 hours  micafungin IVPB 100 milliGRAM(s) IV Intermittent every 24 hours  Nephro-candace 1 Tablet(s) Oral daily  polyethylene glycol 3350 17 Gram(s) Oral daily  senna 2 Tablet(s) Oral at bedtime    MEDICATIONS  (PRN):  acetaminophen   Tablet .. 975 milliGRAM(s) Oral every 6 hours PRN Mild Pain (1 - 3)  oxyCODONE    IR 10 milliGRAM(s) Oral every 6 hours PRN Severe Pain (7 - 10)  oxyCODONE    IR 5 milliGRAM(s) Oral every 4 hours PRN Moderate Pain (4 - 6)      OBJECTIVE:    Vital Signs Last 24 Hrs  T(C): 37.8 (2020 07:00), Max: 37.8 (2020 07:00)  T(F): 100 (2020 07:00), Max: 100 (2020 07:00)  HR: 80 (2020 07:00) (72 - 84)  BP: 120/59 (2020 07:00) (93/58 - 163/80)  BP(mean): 85 (2020 07:00) (68 - 112)  RR: 25 (2020 07:00) (12 - 25)  SpO2: 98% (2020 07:00) (96% - 100%)        I&O's Detail    2020 07:01  -  2020 07:00  --------------------------------------------------------  IN:    Solution: 100 mL    Solution: 100 mL  Total IN: 200 mL    OUT:    Bulb: 35 mL    VAC (Vacuum Assisted Closure) System: 50 mL  Total OUT: 85 mL    Total NET: 115 mL          Daily     Daily Weight in k.2 (2020 04:00)    LABS:                        7.9    20.33 )-----------( 415      ( 2020 00:28 )             23.7     02-04    132<L>  |  95<L>  |  37<H>  ----------------------------<  130<H>  4.8   |  21<L>  |  7.19<H>    Ca    7.7<L>      2020 02:26  Phos  6.0     02-04  Mg     2.2     02-03    TPro  5.0<L>  /  Alb  1.9<L>  /  TBili  0.7  /  DBili  0.3<H>  /  AST  18  /  ALT  5<L>  /  AlkPhos  186<H>  02-04    PT/INR - ( 2020 00:28 )   PT: 14.4 sec;   INR: 1.24 ratio         PTT - ( 2020 00:28 )  PTT:34.3 sec              PHYSICAL EXAM:  Constitutional: resting in bed, NAD  Respiratory: breathing comfortably on RA  Ext: Left arm wrapped in ACE; palpable radial pulse, full ROM, neurovascular grossly intact, compartments soft; palpable pedal pulses b/l 2+ edema, full ROM

## 2020-02-04 NOTE — PROGRESS NOTE ADULT - SUBJECTIVE AND OBJECTIVE BOX
[facility] DIVISION OF KIDNEY DISEASES AND HYPERTENSION -- FOLLOW UP NOTE  --------------------------------------------------------------------------------  Chief Complaint: Fever    24 hour events/subjective:  Patient was seen and examined at bedside  LUE swelling slightly improved  Denies any new complaints      PAST HISTORY  --------------------------------------------------------------------------------  No significant changes to PMH, PSH, FHx, SHx, unless otherwise noted    ALLERGIES & MEDICATIONS  --------------------------------------------------------------------------------  Allergies    coconut (Anaphylaxis)  morphine (Pruritus; Rash)    Intolerances      Standing Inpatient Medications  allopurinol 100 milliGRAM(s) Oral daily  aMIOdarone    Tablet 200 milliGRAM(s) Oral daily  brimonidine 0.2% Ophthalmic Solution 1 Drop(s) Both EYES three times a day  chlorhexidine 2% Cloths 1 Application(s) Topical <User Schedule>  epoetin trey Injectable 69663 Unit(s) IV Push <User Schedule>  famotidine    Tablet 20 milliGRAM(s) Oral daily  gabapentin 100 milliGRAM(s) Oral every 12 hours  latanoprost 0.005% Ophthalmic Solution 1 Drop(s) Both EYES daily  meropenem  IVPB 500 milliGRAM(s) IV Intermittent every 24 hours  micafungin IVPB 100 milliGRAM(s) IV Intermittent every 24 hours  Nephro-candace 1 Tablet(s) Oral daily  polyethylene glycol 3350 17 Gram(s) Oral daily  senna 2 Tablet(s) Oral at bedtime    PRN Inpatient Medications  acetaminophen   Tablet .. 975 milliGRAM(s) Oral every 6 hours PRN  oxyCODONE    IR 10 milliGRAM(s) Oral every 6 hours PRN  oxyCODONE    IR 5 milliGRAM(s) Oral every 4 hours PRN      REVIEW OF SYSTEMS  --------------------------------------------------------------------------------  Gen: No fatigue, fevers/chills, weakness  Skin: No rashes  Head/Eyes/Ears/Mouth: No headache;No sore throat  Respiratory: No dyspnea, cough,   CV: No chest pain, PND, orthopnea  GI: No abdominal pain, diarrhea, constipation, nausea, vomiting  Transplant: No pain  : No increased frequency, dysuria, hematuria, nocturia  MSK: No joint pain/swelling; no back pain; no edema  Neuro: No dizziness/lightheadedness, weakness, seizures, numbness, tingling  Psych: No significant nervousness, anxiety, stress, depression    All other systems were reviewed and are negative, except as noted.    VITALS/PHYSICAL EXAM  --------------------------------------------------------------------------------  T(C): 36.8 (02-04-20 @ 11:00), Max: 37.3 (02-03-20 @ 19:00)  HR: 73 (02-04-20 @ 11:00) (72 - 84)  BP: 90/50 (02-04-20 @ 11:00) (90/50 - 163/80)  RR: 12 (02-04-20 @ 11:00) (12 - 25)  SpO2: 95% (02-04-20 @ 11:00) (95% - 100%)  Wt(kg): --  Height (cm): 180.34 (02-02-20 @ 22:30)  Weight (kg): 63.5 (02-02-20 @ 22:30)  BMI (kg/m2): 19.5 (02-02-20 @ 22:30)  BSA (m2): 1.81 (02-02-20 @ 22:30)      02-03-20 @ 07:01  -  02-04-20 @ 07:00  --------------------------------------------------------  IN: 200 mL / OUT: 85 mL / NET: 115 mL      Physical Exam:  	Gen: NAD  	HEENT: PERRL, supple neck, clear oropharynx  	Pulm: CTA B/L  	CV: RRR, S1S2; no rub  	Back: No spinal or CVA tenderness; no sacral edema  	Abd: +BS, soft, nontender/nondistended  	: No suprapubic tenderness  	UE: 2-3+ edema over LUE.   	LE: 3+ edema in b/l LE .R>L   	Neuro: No focal deficits  	Psych: Normal affect and mood  	Skin: Warm, without rashes      LABS/STUDIES  --------------------------------------------------------------------------------              7.5    22.00 >-----------<  411      [02-04-20 @ 08:46]              22.5     132  |  95  |  37  ----------------------------<  130      [02-04-20 @ 02:26]  4.8   |  21  |  7.19        Ca     7.7     [02-04-20 @ 02:26]      Mg     2.2     [02-03-20 @ 02:58]      Phos  6.0     [02-04-20 @ 00:29]    TPro  5.0  /  Alb  1.9  /  TBili  0.7  /  DBili  0.3  /  AST  18  /  ALT  5   /  AlkPhos  186  [02-04-20 @ 00:29]    PT/INR: PT 14.4 , INR 1.24       [02-04-20 @ 00:28]  PTT: 34.3       [02-04-20 @ 00:28]    CK 86      [02-02-20 @ 17:13]    Creatinine Trend:  SCr 7.19 [02-04 @ 02:26]  SCr 7.11 [02-04 @ 00:29]  SCr 6.36 [02-03 @ 02:58]  SCr 5.67 [02-02 @ 03:50]  SCr 5.57 [02-01 @ 23:28]          BK Virus DNA by PCR:   [facility] Assay Range: 39 copies/mL to 1.00E+10 copies/mL  The limit of quantitation (LOQ) is 39 copies/mL. BK virus DNA  detected below the LOQ will be reported as Detected:<39 copies/mL.  This test was developed and its performance characteristics  determined by [facility]. It has not been cleared or approved  by the [place]. Food and Drug Administration. Results should be used in  conjunction with clinical findings, and should not form the sole  basis for a diagnosis or treatment decision.  ____________________________________________________________  Performed at:  [facility]  [address] [place] [address]  Laura Pettit PhD HCLD(ABB)  CLIA# [id number] (01-14 @ 11:42)          Iron 23, TIBC 189, %sat 12      [01-27-20 @ 14:03]  HbA1c 5.2      [01-13-20 @ 22:59]    HBsAb 351.0      [01-11-20 @ 16:09]  HBsAg Nonreact      [01-11-20 @ 16:09]  HBcAb Nonreact      [01-11-20 @ 16:09]  HCV 0.09, Nonreact      [01-11-20 @ 16:09]

## 2020-02-05 ENCOUNTER — APPOINTMENT (OUTPATIENT)
Dept: RHEUMATOLOGY | Facility: CLINIC | Age: 50
End: 2020-02-05

## 2020-02-05 LAB
ALBUMIN SERPL ELPH-MCNC: 1.2 G/DL — LOW (ref 3.3–5)
ALP SERPL-CCNC: 114 U/L — SIGNIFICANT CHANGE UP (ref 40–120)
ALT FLD-CCNC: <5 U/L — LOW (ref 10–45)
ANION GAP SERPL CALC-SCNC: 13 MMOL/L — SIGNIFICANT CHANGE UP (ref 5–17)
ANION GAP SERPL CALC-SCNC: 9 MMOL/L — SIGNIFICANT CHANGE UP (ref 5–17)
APTT BLD: 33.8 SEC — SIGNIFICANT CHANGE UP (ref 27.5–36.3)
AST SERPL-CCNC: 12 U/L — SIGNIFICANT CHANGE UP (ref 10–40)
BILIRUB DIRECT SERPL-MCNC: 0.2 MG/DL — SIGNIFICANT CHANGE UP (ref 0–0.2)
BILIRUB INDIRECT FLD-MCNC: 0.2 MG/DL — SIGNIFICANT CHANGE UP (ref 0.2–1)
BILIRUB SERPL-MCNC: 0.4 MG/DL — SIGNIFICANT CHANGE UP (ref 0.2–1.2)
BUN SERPL-MCNC: 13 MG/DL — SIGNIFICANT CHANGE UP (ref 7–23)
BUN SERPL-MCNC: 21 MG/DL — SIGNIFICANT CHANGE UP (ref 7–23)
CALCIUM SERPL-MCNC: 4.6 MG/DL — CRITICAL LOW (ref 8.4–10.5)
CALCIUM SERPL-MCNC: 7.8 MG/DL — LOW (ref 8.4–10.5)
CHLORIDE SERPL-SCNC: 117 MMOL/L — HIGH (ref 96–108)
CHLORIDE SERPL-SCNC: 98 MMOL/L — SIGNIFICANT CHANGE UP (ref 96–108)
CO2 SERPL-SCNC: 17 MMOL/L — LOW (ref 22–31)
CO2 SERPL-SCNC: 24 MMOL/L — SIGNIFICANT CHANGE UP (ref 22–31)
CREAT SERPL-MCNC: 2.9 MG/DL — HIGH (ref 0.5–1.3)
CREAT SERPL-MCNC: 4.82 MG/DL — HIGH (ref 0.5–1.3)
DRVVT 50/50: 38.9 SEC — SIGNIFICANT CHANGE UP
DRVVT SCREEN TO CONFIRM RATIO: SIGNIFICANT CHANGE UP
GLUCOSE SERPL-MCNC: 103 MG/DL — HIGH (ref 70–99)
GLUCOSE SERPL-MCNC: 65 MG/DL — LOW (ref 70–99)
HCT VFR BLD CALC: 21.3 % — LOW (ref 39–50)
HCT VFR BLD CALC: 21.7 % — LOW (ref 39–50)
HCT VFR BLD CALC: 21.9 % — LOW (ref 39–50)
HCT VFR BLD CALC: 24.5 % — LOW (ref 39–50)
HGB BLD-MCNC: 6.8 G/DL — CRITICAL LOW (ref 13–17)
HGB BLD-MCNC: 6.9 G/DL — CRITICAL LOW (ref 13–17)
HGB BLD-MCNC: 7.2 G/DL — LOW (ref 13–17)
HGB BLD-MCNC: 8 G/DL — LOW (ref 13–17)
INR BLD: 1.24 RATIO — HIGH (ref 0.88–1.16)
LA NT DPL PPP QL: 51 SEC — SIGNIFICANT CHANGE UP
MAGNESIUM SERPL-MCNC: 1.2 MG/DL — LOW (ref 1.6–2.6)
MAGNESIUM SERPL-MCNC: 2.1 MG/DL — SIGNIFICANT CHANGE UP (ref 1.6–2.6)
MCHC RBC-ENTMCNC: 30.2 PG — SIGNIFICANT CHANGE UP (ref 27–34)
MCHC RBC-ENTMCNC: 30.3 PG — SIGNIFICANT CHANGE UP (ref 27–34)
MCHC RBC-ENTMCNC: 30.4 PG — SIGNIFICANT CHANGE UP (ref 27–34)
MCHC RBC-ENTMCNC: 30.4 PG — SIGNIFICANT CHANGE UP (ref 27–34)
MCHC RBC-ENTMCNC: 31.8 GM/DL — LOW (ref 32–36)
MCHC RBC-ENTMCNC: 31.9 GM/DL — LOW (ref 32–36)
MCHC RBC-ENTMCNC: 32.7 GM/DL — SIGNIFICANT CHANGE UP (ref 32–36)
MCHC RBC-ENTMCNC: 32.9 GM/DL — SIGNIFICANT CHANGE UP (ref 32–36)
MCV RBC AUTO: 92.4 FL — SIGNIFICANT CHANGE UP (ref 80–100)
MCV RBC AUTO: 92.8 FL — SIGNIFICANT CHANGE UP (ref 80–100)
MCV RBC AUTO: 94.7 FL — SIGNIFICANT CHANGE UP (ref 80–100)
MCV RBC AUTO: 95.6 FL — SIGNIFICANT CHANGE UP (ref 80–100)
NORMALIZED SCT PPP-RTO: 0.63 RATIO — SIGNIFICANT CHANGE UP (ref 0–1.16)
NORMALIZED SCT PPP-RTO: SIGNIFICANT CHANGE UP
NRBC # BLD: 0 /100 WBCS — SIGNIFICANT CHANGE UP (ref 0–0)
PHOSPHATE SERPL-MCNC: 2.3 MG/DL — LOW (ref 2.5–4.5)
PHOSPHATE SERPL-MCNC: 4.2 MG/DL — SIGNIFICANT CHANGE UP (ref 2.5–4.5)
PLATELET # BLD AUTO: 427 K/UL — HIGH (ref 150–400)
PLATELET # BLD AUTO: 431 K/UL — HIGH (ref 150–400)
PLATELET # BLD AUTO: 432 K/UL — HIGH (ref 150–400)
PLATELET # BLD AUTO: 445 K/UL — HIGH (ref 150–400)
POTASSIUM SERPL-MCNC: 2.7 MMOL/L — CRITICAL LOW (ref 3.5–5.3)
POTASSIUM SERPL-MCNC: 4.4 MMOL/L — SIGNIFICANT CHANGE UP (ref 3.5–5.3)
POTASSIUM SERPL-SCNC: 2.7 MMOL/L — CRITICAL LOW (ref 3.5–5.3)
POTASSIUM SERPL-SCNC: 4.4 MMOL/L — SIGNIFICANT CHANGE UP (ref 3.5–5.3)
PROCALCITONIN SERPL-MCNC: 1.44 NG/ML — HIGH (ref 0.02–0.1)
PROT SERPL-MCNC: 3.3 G/DL — LOW (ref 6–8.3)
PROTHROM AB SERPL-ACNC: 14.3 SEC — HIGH (ref 10–12.9)
RBC # BLD: 2.25 M/UL — LOW (ref 4.2–5.8)
RBC # BLD: 2.27 M/UL — LOW (ref 4.2–5.8)
RBC # BLD: 2.37 M/UL — LOW (ref 4.2–5.8)
RBC # BLD: 2.64 M/UL — LOW (ref 4.2–5.8)
RBC # FLD: 16.1 % — HIGH (ref 10.3–14.5)
SODIUM SERPL-SCNC: 135 MMOL/L — SIGNIFICANT CHANGE UP (ref 135–145)
SODIUM SERPL-SCNC: 143 MMOL/L — SIGNIFICANT CHANGE UP (ref 135–145)
WBC # BLD: 16.78 K/UL — HIGH (ref 3.8–10.5)
WBC # BLD: 17.41 K/UL — HIGH (ref 3.8–10.5)
WBC # BLD: 17.45 K/UL — HIGH (ref 3.8–10.5)
WBC # BLD: 18.23 K/UL — HIGH (ref 3.8–10.5)
WBC # FLD AUTO: 16.78 K/UL — HIGH (ref 3.8–10.5)
WBC # FLD AUTO: 17.41 K/UL — HIGH (ref 3.8–10.5)
WBC # FLD AUTO: 17.45 K/UL — HIGH (ref 3.8–10.5)
WBC # FLD AUTO: 18.23 K/UL — HIGH (ref 3.8–10.5)

## 2020-02-05 PROCEDURE — 99232 SBSQ HOSP IP/OBS MODERATE 35: CPT

## 2020-02-05 PROCEDURE — 99232 SBSQ HOSP IP/OBS MODERATE 35: CPT | Mod: GC,24

## 2020-02-05 RX ORDER — MAGNESIUM SULFATE 500 MG/ML
2 VIAL (ML) INJECTION
Refills: 0 | Status: DISCONTINUED | OUTPATIENT
Start: 2020-02-05 | End: 2020-02-05

## 2020-02-05 RX ORDER — HYDROMORPHONE HYDROCHLORIDE 2 MG/ML
0.5 INJECTION INTRAMUSCULAR; INTRAVENOUS; SUBCUTANEOUS ONCE
Refills: 0 | Status: DISCONTINUED | OUTPATIENT
Start: 2020-02-05 | End: 2020-02-05

## 2020-02-05 RX ORDER — HYDROMORPHONE HYDROCHLORIDE 2 MG/ML
0.5 INJECTION INTRAMUSCULAR; INTRAVENOUS; SUBCUTANEOUS EVERY 6 HOURS
Refills: 0 | Status: DISCONTINUED | OUTPATIENT
Start: 2020-02-05 | End: 2020-02-12

## 2020-02-05 RX ORDER — OXYCODONE HYDROCHLORIDE 5 MG/1
10 TABLET ORAL EVERY 6 HOURS
Refills: 0 | Status: DISCONTINUED | OUTPATIENT
Start: 2020-02-05 | End: 2020-02-12

## 2020-02-05 RX ORDER — CALCIUM GLUCONATE 100 MG/ML
2 VIAL (ML) INTRAVENOUS
Refills: 0 | Status: DISCONTINUED | OUTPATIENT
Start: 2020-02-05 | End: 2020-02-05

## 2020-02-05 RX ORDER — CALCIUM GLUCONATE 100 MG/ML
2 VIAL (ML) INTRAVENOUS ONCE
Refills: 0 | Status: COMPLETED | OUTPATIENT
Start: 2020-02-05 | End: 2020-02-05

## 2020-02-05 RX ORDER — OXYCODONE HYDROCHLORIDE 5 MG/1
5 TABLET ORAL EVERY 4 HOURS
Refills: 0 | Status: DISCONTINUED | OUTPATIENT
Start: 2020-02-05 | End: 2020-02-12

## 2020-02-05 RX ADMIN — Medication 975 MILLIGRAM(S): at 06:26

## 2020-02-05 RX ADMIN — HYDROMORPHONE HYDROCHLORIDE 0.5 MILLIGRAM(S): 2 INJECTION INTRAMUSCULAR; INTRAVENOUS; SUBCUTANEOUS at 12:00

## 2020-02-05 RX ADMIN — OXYCODONE HYDROCHLORIDE 10 MILLIGRAM(S): 5 TABLET ORAL at 15:20

## 2020-02-05 RX ADMIN — OXYCODONE HYDROCHLORIDE 10 MILLIGRAM(S): 5 TABLET ORAL at 06:40

## 2020-02-05 RX ADMIN — FAMOTIDINE 20 MILLIGRAM(S): 10 INJECTION INTRAVENOUS at 12:43

## 2020-02-05 RX ADMIN — MICAFUNGIN SODIUM 105 MILLIGRAM(S): 100 INJECTION, POWDER, LYOPHILIZED, FOR SOLUTION INTRAVENOUS at 18:13

## 2020-02-05 RX ADMIN — SENNA PLUS 2 TABLET(S): 8.6 TABLET ORAL at 22:01

## 2020-02-05 RX ADMIN — OXYCODONE HYDROCHLORIDE 10 MILLIGRAM(S): 5 TABLET ORAL at 21:15

## 2020-02-05 RX ADMIN — CHLORHEXIDINE GLUCONATE 1 APPLICATION(S): 213 SOLUTION TOPICAL at 05:38

## 2020-02-05 RX ADMIN — OXYCODONE HYDROCHLORIDE 5 MILLIGRAM(S): 5 TABLET ORAL at 10:34

## 2020-02-05 RX ADMIN — GABAPENTIN 100 MILLIGRAM(S): 400 CAPSULE ORAL at 06:27

## 2020-02-05 RX ADMIN — Medication 975 MILLIGRAM(S): at 06:39

## 2020-02-05 RX ADMIN — AMIODARONE HYDROCHLORIDE 200 MILLIGRAM(S): 400 TABLET ORAL at 06:27

## 2020-02-05 RX ADMIN — OXYCODONE HYDROCHLORIDE 10 MILLIGRAM(S): 5 TABLET ORAL at 06:27

## 2020-02-05 RX ADMIN — Medication 100 MILLIGRAM(S): at 12:43

## 2020-02-05 RX ADMIN — GABAPENTIN 100 MILLIGRAM(S): 400 CAPSULE ORAL at 18:12

## 2020-02-05 RX ADMIN — OXYCODONE HYDROCHLORIDE 5 MILLIGRAM(S): 5 TABLET ORAL at 11:04

## 2020-02-05 RX ADMIN — OXYCODONE HYDROCHLORIDE 10 MILLIGRAM(S): 5 TABLET ORAL at 20:43

## 2020-02-05 RX ADMIN — BRIMONIDINE TARTRATE 1 DROP(S): 2 SOLUTION/ DROPS OPHTHALMIC at 15:51

## 2020-02-05 RX ADMIN — BRIMONIDINE TARTRATE 1 DROP(S): 2 SOLUTION/ DROPS OPHTHALMIC at 22:01

## 2020-02-05 RX ADMIN — Medication 200 GRAM(S): at 06:47

## 2020-02-05 RX ADMIN — BRIMONIDINE TARTRATE 1 DROP(S): 2 SOLUTION/ DROPS OPHTHALMIC at 06:21

## 2020-02-05 RX ADMIN — OXYCODONE HYDROCHLORIDE 10 MILLIGRAM(S): 5 TABLET ORAL at 15:40

## 2020-02-05 RX ADMIN — MEROPENEM 100 MILLIGRAM(S): 1 INJECTION INTRAVENOUS at 12:43

## 2020-02-05 RX ADMIN — Medication 1 TABLET(S): at 12:43

## 2020-02-05 RX ADMIN — HYDROMORPHONE HYDROCHLORIDE 0.5 MILLIGRAM(S): 2 INJECTION INTRAMUSCULAR; INTRAVENOUS; SUBCUTANEOUS at 11:45

## 2020-02-05 RX ADMIN — HYDROMORPHONE HYDROCHLORIDE 0.5 MILLIGRAM(S): 2 INJECTION INTRAMUSCULAR; INTRAVENOUS; SUBCUTANEOUS at 04:13

## 2020-02-05 RX ADMIN — HYDROMORPHONE HYDROCHLORIDE 0.5 MILLIGRAM(S): 2 INJECTION INTRAMUSCULAR; INTRAVENOUS; SUBCUTANEOUS at 04:30

## 2020-02-05 RX ADMIN — LATANOPROST 1 DROP(S): 0.05 SOLUTION/ DROPS OPHTHALMIC; TOPICAL at 12:43

## 2020-02-05 RX ADMIN — POLYETHYLENE GLYCOL 3350 17 GRAM(S): 17 POWDER, FOR SOLUTION ORAL at 12:43

## 2020-02-05 NOTE — PROGRESS NOTE ADULT - SUBJECTIVE AND OBJECTIVE BOX
[facility] -- Melissa Kelsey, Enrique, Maximino, Hamzah Funes Savella  Office # [phone]      Follow Up:  AF, cardiac arrest    Subjective/Observations: Patient seen and examined. Events noted. Resting comfortably in bed. No complaints of chest pain, dyspnea, or palpitations reported. No signs of orthopnea or PND.       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  Carpal tunnel syndrome  Brain cyst: had MRI recently- now gone  Anemia  Gastric ulcer: Long time ago  Rheumatoid arthritis  Renal transplant recipient  Breakdown (mechanical) of penile (implanted) prosthesis, sequela  End-stage renal disease (ESRD): PERMACATH PLACEMENT  Abscess of left buttock: s/p I &amp; D  AV fistula: placement left lower arm  S/P cystoscopy: stent placement &amp; removal for kidney stones, lithiotripsy  s/p Lt Carpal tunnel: 2011      MEDICATIONS  (STANDING):  allopurinol 100 milliGRAM(s) Oral daily  aMIOdarone    Tablet 200 milliGRAM(s) Oral daily  brimonidine 0.2% Ophthalmic Solution 1 Drop(s) Both EYES three times a day  chlorhexidine 2% Cloths 1 Application(s) Topical <User Schedule>  chlorhexidine 4% Liquid 1 Application(s) Topical <User Schedule>  epoetin trey Injectable 34000 Unit(s) IV Push <User Schedule>  famotidine    Tablet 20 milliGRAM(s) Oral daily  gabapentin 100 milliGRAM(s) Oral every 12 hours  latanoprost 0.005% Ophthalmic Solution 1 Drop(s) Both EYES daily  meropenem  IVPB 500 milliGRAM(s) IV Intermittent every 24 hours  micafungin IVPB 100 milliGRAM(s) IV Intermittent every 24 hours  Nephro-candace 1 Tablet(s) Oral daily  polyethylene glycol 3350 17 Gram(s) Oral daily  senna 2 Tablet(s) Oral at bedtime    MEDICATIONS  (PRN):  acetaminophen   Tablet .. 975 milliGRAM(s) Oral every 6 hours PRN Mild Pain (1 - 3)  oxyCODONE    IR 10 milliGRAM(s) Oral every 6 hours PRN Severe Pain (7 - 10)  oxyCODONE    IR 5 milliGRAM(s) Oral every 4 hours PRN Moderate Pain (4 - 6)      Allergies    coconut (Anaphylaxis)  morphine (Pruritus; Rash)    Intolerances            Vital Signs Last 24 Hrs  T(C): 37.4 (05 Feb 2020 07:00), Max: 37.4 (05 Feb 2020 07:00)  T(F): 99.4 (05 Feb 2020 07:00), Max: 99.4 (05 Feb 2020 07:00)  HR: 80 (05 Feb 2020 10:00) (66 - 88)  BP: 102/50 (05 Feb 2020 10:00) (86/51 - 138/74)  BP(mean): 72 (05 Feb 2020 10:00) (64 - 100)  RR: 12 (05 Feb 2020 10:00) (9 - 23)  SpO2: 96% (05 Feb 2020 10:00) (93% - 100%)    I&O's Summary    04 Feb 2020 07:01  -  05 Feb 2020 07:00  --------------------------------------------------------  IN: 150 mL / OUT: 1415 mL / NET: -1265 mL    05 Feb 2020 07:01  -  05 Feb 2020 11:05  --------------------------------------------------------  IN: 240 mL / OUT: 0 mL / NET: 240 mL          PHYSICAL EXAM:  TELE:   Constitutional: NAD, awake   HEENT: Moist Mucous Membranes, Anicteric  Pulmonary: Decreased breath sounds b/l. No rales, crackles or wheeze appreciated.   Cardiovascular: Regular, S1 and S2, No murmurs, rubs, gallops or clicks  Gastrointestinal: Bowel Sounds present, soft, nontender.   Lymph: ++ lower ext and LUE peripheral edema. No lymphadenopathy.  Skin: LUE bandaged  Psych:  Mood & affect appropriate for situation    LABS: All Labs Reviewed:                        7.2    18.23 )-----------( 427      ( 05 Feb 2020 04:21 )             21.9                         7.5    21.20 )-----------( 428      ( 04 Feb 2020 18:13 )             23.0                         7.5    22.00 )-----------( 411      ( 04 Feb 2020 08:46 )             22.5     05 Feb 2020 05:57    135    |  98     |  21     ----------------------------<  103    4.4     |  24     |  4.82   05 Feb 2020 04:21    143    |  117    |  13     ----------------------------<  65     2.7     |  17     |  2.90   04 Feb 2020 14:58    133    |  97     |  38     ----------------------------<  89     5.2     |  24     |  7.62     Ca    7.8        05 Feb 2020 05:57  Ca    4.6        05 Feb 2020 04:21  Ca    7.9        04 Feb 2020 14:58  Phos  4.2       05 Feb 2020 05:57  Phos  2.3       05 Feb 2020 04:21  Phos  5.9       04 Feb 2020 14:58  Mg     2.1       05 Feb 2020 05:57  Mg     1.2       05 Feb 2020 04:21  Mg     2.2       04 Feb 2020 14:58    TPro  3.3    /  Alb  1.2    /  TBili  0.4    /  DBili  0.2    /  AST  12     /  ALT  <5     /  AlkPhos  114    05 Feb 2020 04:21  TPro  5.0    /  Alb  1.9    /  TBili  0.7    /  DBili  0.3    /  AST  18     /  ALT  5      /  AlkPhos  186    04 Feb 2020 00:29  TPro  4.5    /  Alb  2.0    /  TBili  0.5    /  DBili  0.3    /  AST  16     /  ALT  5      /  AlkPhos  166    03 Feb 2020 02:58    PT/INR - ( 05 Feb 2020 04:21 )   PT: 14.3 sec;   INR: 1.24 ratio         PTT - ( 05 Feb 2020 04:21 )  PTT:33.8 sec

## 2020-02-05 NOTE — PROGRESS NOTE ADULT - SUBJECTIVE AND OBJECTIVE BOX
Follow Up:  infected hematoma    Interval History: afebrile. notes improvement in LUE pain.     REVIEW OF SYSTEMS  [  ] ROS unobtainable because:    [ x ] All other systems negative except as noted below    Constitutional:  [ ] fever [ ] chills  [ ] weight loss  [ ] weakness  Skin:  [ ] rash [ ] phlebitis	  Eyes: [ ] icterus [ ] pain  [ ] discharge	  ENMT: [ ] sore throat  [ ] thrush [ ] ulcers [ ] exudates  Respiratory: [ ] dyspnea [ ] hemoptysis [ ] cough [ ] sputum	  Cardiovascular:  [ ] chest pain [ ] palpitations [ ] edema	  Gastrointestinal:  [ ] nausea [ ] vomiting [ ] diarrhea [ ] constipation [ x] pain	  Genitourinary:  [ ] dysuria [ ] frequency [ ] hematuria [ ] discharge [ ] flank pain  [ ] incontinence  Musculoskeletal:  [ ] myalgias [ ] arthralgias [ ] arthritis  [ ] back pain +LUE edema and pain  Neurological:  [ ] headache [ ] seizures  [ ] confusion/altered mental status      Allergies  coconut (Anaphylaxis)  morphine (Pruritus; Rash)        ANTIMICROBIALS:  meropenem  IVPB 500 every 24 hours  micafungin IVPB 100 every 24 hours      OTHER MEDS:  MEDICATIONS  (STANDING):  acetaminophen   Tablet .. 975 every 6 hours PRN  allopurinol 100 daily  aMIOdarone    Tablet 200 daily  epoetin trey Injectable 17531 <User Schedule>  famotidine    Tablet 20 daily  gabapentin 100 every 12 hours  HYDROmorphone  Injectable 0.5 every 6 hours PRN  oxyCODONE    IR 10 every 6 hours PRN  oxyCODONE    IR 5 every 4 hours PRN  polyethylene glycol 3350 17 daily  senna 2 at bedtime      Vital Signs Last 24 Hrs  T(C): 37.7 (05 Feb 2020 19:00), Max: 37.7 (05 Feb 2020 19:00)  T(F): 99.9 (05 Feb 2020 19:00), Max: 99.9 (05 Feb 2020 19:00)  HR: 73 (05 Feb 2020 19:00) (69 - 88)  BP: 122/57 (05 Feb 2020 19:00) (86/51 - 140/63)  BP(mean): 82 (05 Feb 2020 19:00) (64 - 100)  RR: 16 (05 Feb 2020 19:00) (10 - 23)  SpO2: 98% (05 Feb 2020 19:00) (93% - 100%)    PHYSICAL EXAMINATION:  General: Alert and Awake, NAD  HEENT: PERRL, EOMI  Neck: Supple  Chest: R Chest Shiley (No surrounding erythema, drainage or tenderness to palpation)  Cardiac: RRR, No M/R/G  Resp: CTAB, No Wh/Rh/Ra  Abdomen: +RLQ drain with serosanguinous drainage. +RLQ wound vac over transplant nephrectomy site, NBS, mild tenderness to palpation over RLQ  MSK: +LUE edema and pain at proximal arm. 1-2+ RLE edema. No Calf tenderness  Skin: No rashes or lesions. Skin is warm and dry to the touch.   Neuro: Alert and Awake. CN 2-12 Grossly intact. Moves all four extremities spontaneously.  Psych: Calm, Pleasant, Cooperative                            8.0    17.41 )-----------( 431      ( 05 Feb 2020 19:32 )             24.5       02-05    135  |  98  |  21  ----------------------------<  103<H>  4.4   |  24  |  4.82<H>    Ca    7.8<L>      05 Feb 2020 05:57  Phos  4.2     02-05  Mg     2.1     02-05    TPro  3.3<L>  /  Alb  1.2<L>  /  TBili  0.4  /  DBili  0.2  /  AST  12  /  ALT  <5<L>  /  AlkPhos  114  02-05          MICROBIOLOGY:  v  .Blood Blood  01-24-20   No growth at 5 days.  --  --      .Body Fluid RLQ Fluid Collection  01-24-20   No growth  --  --      .Blood Blood  01-24-20   No growth at 5 days.  --  --      .Body Fluid Abdominal Fluid  01-22-20   No growth at 5 days  --    polymorphonuclear leukocytes seen  Gram Negative Rods seen  by cytocentrifuge      .Blood Blood  01-21-20   No growth at 5 days.  --  --      .Blood Blood  01-21-20   No growth at 5 days.  --  --      .Sputum Sputum, trap  01-18-20   Normal Respiratory Gillian present  --    No polymorphonuclear leukocytes per low power field  Rare Squamous epithelial cells per low power field  Rare Gram positive cocci in pairs per oil power field      .Blood Blood  01-16-20   No growth at 5 days.  --  --      .Body Fluid Abdominal Fluid  01-15-20   No growth at 5 days  --    polymorphonuclear leukocytes seen  No organisms seen  by cytocentrifuge      .Tissue Other  01-10-20   No growth at 5 days  --  Enterobacter cloacae (Carbapenem Resistant)      .Blood Blood-Venous  01-07-20   No growth at 5 days.  --  --      .Urine Catheterized  01-07-20   <10,000 CFU/mL Normal Urogenital Gillian  --  --      Abdominal Fl Abdominal Fluid  01-06-20   No growth at 5 days  --    No polymorphonuclear leukocytes seen  No organisms seen  by cytocentrifuge                RADIOLOGY:    <The imaging below has been reviewed and visualized by me independently. Findings as detailed in report below>    EXAM:  CT ANGIO UPR EXT (W)AW IC LT                EXAM:  CT ANGIO CHEST (W)AW IC                      PROCEDURE DATE:  02/01/2020    Large left upper extremity hematoma measuring 17.3 x 5.0 cm which extends into the left axilla with active arterial extravasation which persists on more delayed imaging.

## 2020-02-05 NOTE — PROGRESS NOTE ADULT - PROBLEM SELECTOR PLAN 1
underwent IHD yesterday ( 2/4/20) . On 2/1/2020 hew had a fistula infiltration/ hematoma which required  multiple transfusions and AC was held.  Seen by vascular - no intervention at this time.  Follows with primary nephrologist Dr Amaya for dialysis.

## 2020-02-05 NOTE — PROGRESS NOTE ADULT - ATTENDING COMMENTS
agree with above  has not required blood transfusion for past two days  arm swelling a little better  remains off anticoagulation  continue antibiotics

## 2020-02-05 NOTE — PROGRESS NOTE ADULT - SUBJECTIVE AND OBJECTIVE BOX
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 Patient was admitted and hospital course is as follows:    12/27 - started on Tamiflu, Duplex of the transplanted kidney demonstrated an increase in the perinephric hematoma from 5.7 cm to 9.4 cm  12/29 - patient reporting dysuria so urine culture sent, which was positive for Enterobacter cloacae  01/01 - s/p right groin exploration, washout of the perinephric hematoma, and biopsy of the transplanted kidney, cultures of the hematoma positive for Enterobacter cloacae & E. coli, biopsy demonstrates  01/05 - patient noted to be febrile and hypotensive with new episodes of wide complex tachycardia on telemetry, labs significant for worsening leukocytosis, cultures sent, CT scan obtained & demonstrated a persistent perinephric collection, admitted to SICU for hemodynamic monitoring  01/06 - FNA w/ IR with aspiration of 18 mL of clear & pale yellow fluid but cultures negative for any growth, patient began having gross hematuria requiring CBI  01/07 - repeat imaging demonstrated no change in the size of the perinephric collection  01/08 - RLE edema was noted so a LE Duplex was obtained, which demonstrated a right external iliac & common femoral vein DVT, vascular surgery consulted, patient started on a heparin infusion  01/10 - patient became altered with worsening hematuria despite CBI, patient was taken to IR for an angiogram, which demonstrated a pseudoaneurysm of the transplant renal artery & external iliac artery anastomosis so the pseudoaneurysm was stented & an IVC filter was placed, patient taken to the OR and is s/p right groin exploration, washout of the RP space, transplant nephrectomy w/ repair of the right external iliac artery w/ a bovine patch & removal of the stent placed by IR, and thrombectomy of the RLE veins, returned to SICU still intubated  01/13 - acutely bradycardic and hypotensive progressing into PEA arrest with progression into torsades requiring ACLS for a total of ~12 mins, imaging revealed saddle PE with TTE demonstrating RV strain, PERT team consulted, management with heparin infusion as patient is not a tPA candidate, did not require vasopressors shortly after ROSC  01/15 - ultrasound with large right iliac fossa collection s/p percutaneous drainage by IR, 220 mL of dark red fluid drained and 10 Fr drain was left in placed  01/16 - extubated  01/19 - RUE Duplex with thrombosis of the brachial & cephalic veins, RLQ ultrasound with interval decrease in right iliac fossa collection  01/21 - CTA chest and CT abdomen & pelvis obtained demonstrating known RLL and LLL lobar artery PEs but resolution of PE in bilateral pulmonary arteries, new BLL wedge-like opacities likely representing pulmonary infarcts, and interval decrease in RLQ fluid collection  01/22 - RLQ IR drain upsized by IR from 10 Fr to 12 Fr  01/23 - Changed heparin infusion to Eliquis, stable for transfer to floor on 1/25.   1/23-2/1: No acute issues, rehab planning. However on 2/1 while undergoing  hemodialysis through L AVF, had massive infiltration, transferred to Surgical Intensive Care Unit for q1h neurovascular checks with concern for upper arm compartment syndrome  2/2-2/3: IR LUE angiogram, tiny PSA from distal branches of deep brachial artery noted and embolized, no venous injury noted at AVF    24 HOUR EVENTS:    SUBJECTIVE/ROS:  [ ] A ten-point review of systems was otherwise negative except as noted.  [ ] Due to altered mental status/intubation, subjective information were not able to be obtained from the patient. History was obtained, to the extent possible, from review of the chart and collateral sources of information.      NEURO  RASS:     GCS:     [facility] ICU:  Exam:   Meds: acetaminophen   Tablet .. 975 milliGRAM(s) Oral every 6 hours PRN Mild Pain (1 - 3)  gabapentin 100 milliGRAM(s) Oral every 12 hours  oxyCODONE    IR 10 milliGRAM(s) Oral every 6 hours PRN Severe Pain (7 - 10)  oxyCODONE    IR 5 milliGRAM(s) Oral every 4 hours PRN Moderate Pain (4 - 6)    [x] Adequacy of sedation and pain control has been assessed and adjusted      RESPIRATORY  RR: 19 (02-04-20 @ 23:15) (9 - 25)  SpO2: 97% (02-04-20 @ 23:15) (95% - 100%)  Wt(kg): --  Exam: unlabored, clear to auscultation bilaterally  Mechanical Ventilation:     [ ] Extubation Readiness Assessed  Meds:       CARDIOVASCULAR  HR: 81 (02-04-20 @ 23:15) (66 - 82)  BP: 128/74 (02-04-20 @ 23:15) (89/65 - 147/68)  BP(mean): 100 (02-04-20 @ 22:00) (64 - 100)  ABP: --  ABP(mean): --  Wt(kg): --  CVP(cm H2O): --      Exam:  Cardiac Rhythm:  Perfusion     [ ]Adequate   [ ]Inadequate  Mentation   [ ]Normal       [ ]Reduced  Extremities  [ ]Warm         [ ]Cool  Volume Status [ ]Hypervolemic [ ]Euvolemic [ ]Hypovolemic  Meds: aMIOdarone    Tablet 200 milliGRAM(s) Oral daily        GI/NUTRITION  Exam:  Diet:  Meds: famotidine    Tablet 20 milliGRAM(s) Oral daily  polyethylene glycol 3350 17 Gram(s) Oral daily  senna 2 Tablet(s) Oral at bedtime      GENITOURINARY  I&O's Detail    02-03 @ 07:01  -  02-04 @ 07:00  --------------------------------------------------------  IN:    Solution: 100 mL    Solution: 100 mL  Total IN: 200 mL    OUT:    Bulb: 35 mL    VAC (Vacuum Assisted Closure) System: 50 mL  Total OUT: 85 mL    Total NET: 115 mL      02-04 @ 07:01  -  02-05 @ 00:36  --------------------------------------------------------  IN:    Solution: 50 mL    Solution: 100 mL  Total IN: 150 mL    OUT:    Bulb: 5 mL    Other: 1400 mL  Total OUT: 1405 mL    Total NET: -1255 mL          02-04    133<L>  |  97  |  38<H>  ----------------------------<  89  5.2   |  24  |  7.62<H>    Ca    7.9<L>      04 Feb 2020 14:58  Phos  5.9     02-04  Mg     2.2     02-04    TPro  5.0<L>  /  Alb  1.9<L>  /  TBili  0.7  /  DBili  0.3<H>  /  AST  18  /  ALT  5<L>  /  AlkPhos  186<H>  02-04    [ ] Kennedy catheter, indication: N/A  Meds: Nephro-candace 1 Tablet(s) Oral daily  sodium chloride 0.9% lock flush 10 milliLiter(s) IV Push every 1 hour PRN Pre/post blood products, medications, blood draw, and to maintain line patency        HEMATOLOGIC  Meds:   [x] VTE Prophylaxis                        7.5    21.20 )-----------( 428      ( 04 Feb 2020 18:13 )             23.0     PT/INR - ( 04 Feb 2020 00:28 )   PT: 14.4 sec;   INR: 1.24 ratio         PTT - ( 04 Feb 2020 00:28 )  PTT:34.3 sec  Transfusion     [ ] PRBC   [ ] Platelets   [ ] FFP   [ ] Cryoprecipitate      INFECTIOUS DISEASES  T(C): 36.4 (02-04-20 @ 23:15), Max: 37.2 (02-04-20 @ 07:00)  Wt(kg): --  WBC Count: 21.20 K/uL (02-04 @ 18:13)  WBC Count: 22.00 K/uL (02-04 @ 08:46)    Recent Cultures:    Meds: epoetin trey Injectable 73815 Unit(s) IV Push <User Schedule>  meropenem  IVPB 500 milliGRAM(s) IV Intermittent every 24 hours  micafungin IVPB 100 milliGRAM(s) IV Intermittent every 24 hours        ENDOCRINE  Capillary Blood Glucose    Meds: allopurinol 100 milliGRAM(s) Oral daily        ACCESS DEVICES:  [ ] Peripheral IV  [ ] Central Venous Line	[ ] R	[ ] L	[ ] IJ	[ ] Fem	[ ] SC	Placed:   [ ] Arterial Line		[ ] R	[ ] L	[ ] Fem	[ ] Rad	[ ] Ax	Placed:   [ ] PICC:					[ ] Mediport  [ ] Urinary Catheter, Date Placed:   [ ] Necessity of urinary, arterial, and venous catheters discussed    OTHER MEDICATIONS:  brimonidine 0.2% Ophthalmic Solution 1 Drop(s) Both EYES three times a day  chlorhexidine 2% Cloths 1 Application(s) Topical <User Schedule>  chlorhexidine 4% Liquid 1 Application(s) Topical <User Schedule>  latanoprost 0.005% Ophthalmic Solution 1 Drop(s) Both EYES daily      CODE STATUS:     IMAGING: HISTORY  49y Male with a PMHx of HTN, gout, glaucoma, RA, 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 Patient was admitted and hospital course is as follows:    12/27 - started on Tamiflu, Duplex of the transplanted kidney demonstrated an increase in the perinephric hematoma from 5.7 cm to 9.4 cm  12/29 - patient reporting dysuria so urine culture sent, which was positive for Enterobacter cloacae  01/01 - s/p right groin exploration, washout of the perinephric hematoma, and biopsy of the transplanted kidney, cultures of the hematoma positive for Enterobacter cloacae & E. coli, biopsy demonstrates  01/05 - patient noted to be febrile and hypotensive with new episodes of wide complex tachycardia on telemetry, labs significant for worsening leukocytosis, cultures sent, CT scan obtained & demonstrated a persistent perinephric collection, admitted to SICU for hemodynamic monitoring  01/06 - FNA w/ IR with aspiration of 18 mL of clear & pale yellow fluid but cultures negative for any growth, patient began having gross hematuria requiring CBI  01/07 - repeat imaging demonstrated no change in the size of the perinephric collection  01/08 - RLE edema was noted so a LE Duplex was obtained, which demonstrated a right external iliac & common femoral vein DVT, vascular surgery consulted, patient started on a heparin infusion  01/10 - patient became altered with worsening hematuria despite CBI, patient was taken to IR for an angiogram, which demonstrated a pseudoaneurysm of the transplant renal artery & external iliac artery anastomosis so the pseudoaneurysm was stented & an IVC filter was placed, patient taken to the OR and is s/p right groin exploration, washout of the RP space, transplant nephrectomy w/ repair of the right external iliac artery w/ a bovine patch & removal of the stent placed by IR, and thrombectomy of the RLE veins, returned to SICU still intubated  01/13 - acutely bradycardic and hypotensive progressing into PEA arrest with progression into torsades requiring ACLS for a total of ~12 mins, imaging revealed saddle PE with TTE demonstrating RV strain, PERT team consulted, management with heparin infusion as patient is not a tPA candidate, did not require vasopressors shortly after ROSC  01/15 - ultrasound with large right iliac fossa collection s/p percutaneous drainage by IR, 220 mL of dark red fluid drained and 10 Fr drain was left in placed  01/16 - extubated  01/19 - RUE Duplex with thrombosis of the brachial & cephalic veins, RLQ ultrasound with interval decrease in right iliac fossa collection  01/21 - CTA chest and CT abdomen & pelvis obtained demonstrating known RLL and LLL lobar artery PEs but resolution of PE in bilateral pulmonary arteries, new BLL wedge-like opacities likely representing pulmonary infarcts, and interval decrease in RLQ fluid collection  01/22 - RLQ IR drain upsized by IR from 10 Fr to 12 Fr  01/23 - Changed heparin infusion to Eliquis, stable for transfer to floor on 1/25.   1/23-2/1: No acute issues, rehab planning. However on 2/1 while undergoing  hemodialysis through L AVF, had massive infiltration, transferred to Surgical Intensive Care Unit for q1h neurovascular checks with concern for upper arm compartment syndrome  2/2-2/3: IR LUE angiogram, tiny PSA from distal branches of deep brachial artery noted and embolized, no venous injury noted at AVF  2/4: ALCIDES nina placed as temporary HD access    24 HOUR EVENTS:  - Negative 1.4 L after HD  - CBC stable  - Leukocytosis persists, continues on Meropenem    SUBJECTIVE/ROS:  [x] A ten-point review of systems was otherwise negative except as noted.  [ ] Due to altered mental status/intubation, subjective information were not able to be obtained from the patient. History was obtained, to the extent possible, from review of the chart and collateral sources of information.      NEURO  Exam: A and O x 3, WHITING, FC  Meds: acetaminophen   Tablet .. 975 milliGRAM(s) Oral every 6 hours PRN Mild Pain (1 - 3)  gabapentin 100 milliGRAM(s) Oral every 12 hours  oxyCODONE    IR 10 milliGRAM(s) Oral every 6 hours PRN Severe Pain (7 - 10)  oxyCODONE    IR 5 milliGRAM(s) Oral every 4 hours PRN Moderate Pain (4 - 6)    [x] Adequacy of sedation and pain control has been assessed and adjusted      RESPIRATORY  RR: 19 (02-04-20 @ 23:15) (9 - 25)  SpO2: 97% (02-04-20 @ 23:15) (95% - 100%)  Wt(kg): --  Exam: unlabored, clear to auscultation bilaterally    CARDIOVASCULAR  HR: 81 (02-04-20 @ 23:15) (66 - 82)  BP: 128/74 (02-04-20 @ 23:15) (89/65 - 147/68)  BP(mean): 100 (02-04-20 @ 22:00) (64 - 100)    Exam: Regular rate and rhythm, Normal S1/S2  Cardiac Rhythm: NSR  Perfusion     [x]Adequate   [ ]Inadequate  Mentation   [x]Normal       [ ]Reduced  Extremities  [x]Warm         [ ]Cool  Volume Status [x]Hypervolemic [ ]Euvolemic [ ]Hypovolemic  Meds: aMIOdarone    Tablet 200 milliGRAM(s) Oral daily        GI/NUTRITION  Exam: Soft, NT, ND  Diet: Renal diet  Meds: famotidine    Tablet 20 milliGRAM(s) Oral daily  polyethylene glycol 3350 17 Gram(s) Oral daily  senna 2 Tablet(s) Oral at bedtime      GENITOURINARY  I&O's Detail    02-03 @ 07:01  -  02-04 @ 07:00  --------------------------------------------------------  IN:    Solution: 100 mL    Solution: 100 mL  Total IN: 200 mL    OUT:    Bulb: 35 mL    VAC (Vacuum Assisted Closure) System: 50 mL  Total OUT: 85 mL    Total NET: 115 mL      02-04 @ 07:01  -  02-05 @ 00:36  --------------------------------------------------------  IN:    Solution: 50 mL    Solution: 100 mL  Total IN: 150 mL    OUT:    Bulb: 5 mL    Other: 1400 mL  Total OUT: 1405 mL    Total NET: -1255 mL          02-04    133<L>  |  97  |  38<H>  ----------------------------<  89  5.2   |  24  |  7.62<H>    Ca    7.9<L>      04 Feb 2020 14:58  Phos  5.9     02-04  Mg     2.2     02-04    TPro  5.0<L>  /  Alb  1.9<L>  /  TBili  0.7  /  DBili  0.3<H>  /  AST  18  /  ALT  5<L>  /  AlkPhos  186<H>  02-04    [ ] Kennedy catheter, indication: N/A  Meds: Nephro-candace 1 Tablet(s) Oral daily  sodium chloride 0.9% lock flush 10 milliLiter(s) IV Push every 1 hour PRN Pre/post blood products, medications, blood draw, and to maintain line patency        HEMATOLOGIC  Meds:   [x] VTE Prophylaxis                        7.5    21.20 )-----------( 428      ( 04 Feb 2020 18:13 )             23.0     PT/INR - ( 04 Feb 2020 00:28 )   PT: 14.4 sec;   INR: 1.24 ratio         PTT - ( 04 Feb 2020 00:28 )  PTT:34.3 sec  Transfusion     [ ] PRBC   [ ] Platelets   [ ] FFP   [ ] Cryoprecipitate      INFECTIOUS DISEASES  T(C): 36.4 (02-04-20 @ 23:15), Max: 37.2 (02-04-20 @ 07:00)  Wt(kg): --  WBC Count: 21.20 K/uL (02-04 @ 18:13)  WBC Count: 22.00 K/uL (02-04 @ 08:46)    Recent Cultures:    Meds: epoetin trey Injectable 90554 Unit(s) IV Push <User Schedule>  meropenem  IVPB 500 milliGRAM(s) IV Intermittent every 24 hours  micafungin IVPB 100 milliGRAM(s) IV Intermittent every 24 hours        ENDOCRINE  Capillary Blood Glucose    Meds: allopurinol 100 milliGRAM(s) Oral daily        ACCESS DEVICES:  [x] Peripheral IV  [x] Central Venous Line	[ ] R	[ ] L	[x] IJ	[ ] Fem	[ ] SC	Placed: 2/4  [ ] Arterial Line		[ ] R	[ ] L	[ ] Fem	[ ] Rad	[ ] Ax	Placed:   [ ] PICC:					[ ] Mediport  [ ] Urinary Catheter, Date Placed:   [x] Necessity of urinary, arterial, and venous catheters discussed    OTHER MEDICATIONS:  brimonidine 0.2% Ophthalmic Solution 1 Drop(s) Both EYES three times a day  chlorhexidine 2% Cloths 1 Application(s) Topical <User Schedule>  chlorhexidine 4% Liquid 1 Application(s) Topical <User Schedule>  latanoprost 0.005% Ophthalmic Solution 1 Drop(s) Both EYES daily

## 2020-02-05 NOTE — PROGRESS NOTE ADULT - ASSESSMENT
49M PMHx of ESRD on HD  ( via LUE AVF), HTN, Gout, Glaucoma, NET of pancreas, RA with hand contractures. s/p DDRT 12/8/19,  Post op course c/b DGF requiring HD. Renal bx x 2 c/w profound ATN.     Re-admitted with Influenza and perinephric hematoma. s/p hematoma evacuation/ abd washout and renal biopsy. Post op course c/b leukocytosis, infected hematoma, acute RLE DVT, and bleeding pseudoaneurysm at txp renal artery anastomosis. Now s/p graft nephrectomy. Course further complicated by saddle PE causing right heart strain and hemodynamic instability leading to cardiac arrest requiring ACLS,  Required IR drainage of  post-op collections x 2  now complicated by bleeding following cannulation of  LUE AVF with findings of a tiny pseudoaneurysm arising from a distal branch of the left deep brachial artery s/p dissection of a tiny feeder artery that resulted in resolution of the pseudoaneurysm.    [] DDRT c/b DGF/ATN/perinephric infected hematoma/pseudoneurysm/graft nephrectomy   - S/P AFV cannulation for HD yesterday c/b infiltration causing extravasation/bleeding to the left upper arm and chest noted on CTA  - S/P IR LUE arteriography showing a tiny pseudoaneurysm arising from a distal branch of the left deep brachial artery s/p dissection of a tiny feeder artery that resulted in resolution of the pseudoaneurysm.  - Daily CBC  - ID: denied permcath; s/p RIJ shiley placement yesterday. Per ID, needs two neg blood cultures before getting permcath.   - Eliquis/ASA on hold. Will discuss time to resume  - Bedside IR drainage of RLQ fluid collection (1/15), drain repositioned 1/22 and cultures NTD  - ID following;  cont Meropenem, Micafungin 100mg daily.   ID plan for discharge on cipro.  - pain control  - bowel regimen  -on pepcid.   - PT/OT/OOB.  VAC change per PT  - Keep B/L LE w/ compression stockings. Has gout pain on R knee, avoid covering the R knee. Apply compression stocking on R side up to just below knee level and apply ACE bandage above the knee on the R side  up to mid/upper thigh.       [] Saddle PE, with right heart strain and complicated by PEA arrest followed by Torsade, ROSC achieved after ACLS  - vascular, CT and vascular cardiology following   -Repeat chest CT 1/28 noted Persistent bilateral lower lobe pulmonary emboli.   - CT 1/21: There are pulmonary emboli within the right lower lobar arteries and the left lower lobar arteries extending into the distal segments as seen on prior study. The pulmonary emboli seen in the right and left pulmonary arteries are no longer visualized. New distal to mid brachial DVT noted  - 1/19 Duplex: RUE Deep vein thrombosis in the mid to distal brachial vein.  - Eliquis/ASA held 2/1 in the setting of AVF bleed   - repeat ECHO with improved R heart strain    [] New Afib/Vtech  Cardiology following, BB low dose in the setting of WCT and hold BB on HD days  Continue amiodarone, card plan to continue for couple of months and will f/u as outpt    [] New acute DVT of R distal brachia vein   - continue to hold anticoagulation due to active LT AVF bleed    [] New acute DVT of R external iliac, common femoral, femoral veins, R soleal vein   - IVC filter placed 1/9, hold anticoagulation due to active afv bleed     [] Thrombocytosis  - PLT now 423; were previously in 800s; cont to trend.    -Heme consulted; likely reactive. Heme work up in progress    [] Dispo  - continue SICU level of care  -discharge to acute rehab once medically stable.

## 2020-02-05 NOTE — PROGRESS NOTE ADULT - SUBJECTIVE AND OBJECTIVE BOX
Transplant Surgery - Multidisciplinary Rounds  --------------------------------------------------------------  R DDRT    Date:  12/8/19     Present: Patient seen with multidisciplinary team including ( Transplant Surgeon: Dr. Cruz, Dr. Espinal. Transplant Nephrologist:  Dr. CHONG Puckett.  Pharmacist: Job Diaz. PAs: Arlin Montano  and Felecia Arzola in am rounds and examined with Dr. Cruz. Disciplines not in attendance will be notified of the plan.     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 Young, followed by Dr. Raphael, [facility] Oncologist), RA with hand contractures. He underwent DDRT on 12/8/19. Post op course c/b DGF requiring HD, thrombocytopenia, elevated LDH and haptoglobin. Schistocytes  on peripheral smear concerning for TMA. Envarsus held. Received Belatacept 12/13. Started on PLEX (12/12, 12/15). Underwent renal bx on 12/13 c/w profound ATN.  Discharged home on 12/20/19 w/ outpatient HD.     Re-admitted 12/27 with influenza, completed treatement with Tamiflu on 12/31. Urine cx on admission grew Ent cloacae, was started on meropenem 12/31. Renal US on admission with increasing hematoma.   ·	OR 1/1 for ex-lap, hematoma evacuation, washout and renal bx. (c/w ATN)  OR cxs grew CRE and e coli. Post op course c/b:   ·	fevers and leukocytosis, CT a/p (1/4) with increased perinephric collection. Underwent IR guided collection 1/6   ·	New onset hematuria, martinez inserted 1/7, started CBI  ·	RLE edema, RLE doppler (1/8) with acute above the knee thrombus of R external iliac, common femoral and femoral veins, Acute calf vein thrombus affecting R soleal vein, Heparin gtt initiated   ·	AMS 1/9  (head CT neg), hyponatremic  ·	Significant hematuria and bleeding around the martinez. Angio 1/9 showed actively bleeding pseudoaneurysm at the anastomosis of the transplant renal artery with the recipient iliac artery. A stent was placed in the iliac artery to  control hemorrhage. IVC filter placed.   ·	Emergent OR 1/9-1/10 for graft nephrectomy;  ureter to bladder anastomosis was completely disrupted, repaired the R external iliac artery with bovine pericardial patch, repaired right external iliac vein, performed thrombectomy of RLE veins, Intra op received 3u PRBC.   ·	Extubated 1/11  ·	1/13 Saddle PE, with right heart strain and complicated by PEA arrest followed by Torsade, ROSC achieved after ACLS; re-intubated  ·	1/15 Renal US with 13.7 x 5.0 x 1.5 cm in RLQ transplant bed; s/p bedside IR guided drainage, pigtail in place. Fluid cx to date with NG  ·	1/21 CT C/A/P showing persistent collection, catheter at superior portion of collection  ·	1/22 IR pigtail drain repositioned and upsized to 12Fr catheter w/ increased output  ·	1/23 Transitioned to Eliquis from Heparin drip  ·	2/1  AVF infiltrated/ w/ - arterial extrav/ bleeding  CW hematoma     Interval events:    - S/P AFV cannulation for HD 2/1 with infiltration causing extravasation/bleeding to the left upper arm and chest noted on CTA  - 2/3: Left upper cavity arteriography demonstrated a tiny pseudoaneurysm arising from a distal branch of the left deep brachial artery. The tiny feeder artery was dissected resulting in resolution of the pseudoaneurysm.  - H/H remains stable.   - Eliquis still Held  - LUE edema with tenderness under axilla along rib cage and across left chest is improving.   - SCr 4.82, K 4.4,  anuric. S/p RIJ shiley placement yesterday. Received HD yesterday.   - Still with 2+ LE edema  - TMax 37.3. WBC down to 18.23 from 21.20. On meropenem and micafungin.       cxs:   12/29: Urine Cx: Enterobacter Cloacae  1/1 OR Perinephric collection Cx:  Carbapenem resistant Ent Cloacae, E. Coli  1/1: R kidney abscess swab: Enterobacter Cloacae  1/1  OR tissue: Enterobacter Cloacae  1/5: Urine Cx: Enteropbacter Cloacae  1/5: Skin incision Cx (bedside): NG  1/6: Adominal fluid Cx from IR:  NG   1/7: Urine Cx:  NG  1/7: BCX2:  NG  1/10 retroperit hematoma - enerobacter   1/10 bladder hematoma - enterobacter  1/15 Drainage fluid - NGTD  1/16: BC X2: NGTD  1/18: SPutum: Normal harvey  1/21: BC X2: NTGD  1/22: IR body fluid Cx: GNR pre-milian    Potential Discharge date: pending clinical improvement    MEDICATIONS  (STANDING):  allopurinol 100 milliGRAM(s) Oral daily  aMIOdarone    Tablet 200 milliGRAM(s) Oral daily  brimonidine 0.2% Ophthalmic Solution 1 Drop(s) Both EYES three times a day  chlorhexidine 2% Cloths 1 Application(s) Topical <User Schedule>  chlorhexidine 4% Liquid 1 Application(s) Topical <User Schedule>  epoetin trey Injectable 96127 Unit(s) IV Push <User Schedule>  famotidine    Tablet 20 milliGRAM(s) Oral daily  gabapentin 100 milliGRAM(s) Oral every 12 hours  latanoprost 0.005% Ophthalmic Solution 1 Drop(s) Both EYES daily  meropenem  IVPB 500 milliGRAM(s) IV Intermittent every 24 hours  micafungin IVPB 100 milliGRAM(s) IV Intermittent every 24 hours  Nephro-candace 1 Tablet(s) Oral daily  polyethylene glycol 3350 17 Gram(s) Oral daily  senna 2 Tablet(s) Oral at bedtime    MEDICATIONS  (PRN):  acetaminophen   Tablet .. 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  Carpal tunnel syndrome  Brain cyst: had MRI recently- now gone  Anemia  Gastric ulcer: Long time ago  Rheumatoid arthritis  Renal transplant recipient  Breakdown (mechanical) of penile (implanted) prosthesis, sequela  End-stage renal disease (ESRD): PERMACATH PLACEMENT  Abscess of left buttock: s/p I &amp; D  AV fistula: placement left lower arm  S/P cystoscopy: stent placement &amp; removal for kidney stones, lithiotripsy  s/p Lt Carpal tunnel: 2011      Vital Signs Last 24 Hrs  T(C): 37.4 (05 Feb 2020 07:00), Max: 37.4 (05 Feb 2020 07:00)  T(F): 99.4 (05 Feb 2020 07:00), Max: 99.4 (05 Feb 2020 07:00)  HR: 76 (05 Feb 2020 11:00) (66 - 88)  BP: 106/53 (05 Feb 2020 11:00) (86/51 - 138/74)  BP(mean): 76 (05 Feb 2020 11:00) (64 - 100)  RR: 15 (05 Feb 2020 11:00) (9 - 23)  SpO2: 97% (05 Feb 2020 11:00) (93% - 100%)    I&O's Summary    04 Feb 2020 07:01  -  05 Feb 2020 07:00  --------------------------------------------------------  IN: 150 mL / OUT: 1415 mL / NET: -1265 mL    05 Feb 2020 07:01  -  05 Feb 2020 11:09  --------------------------------------------------------  IN: 240 mL / OUT: 0 mL / NET: 240 mL                              7.2    18.23 )-----------( 427      ( 05 Feb 2020 04:21 )             21.9     02-05    135  |  98  |  21  ----------------------------<  103<H>  4.4   |  24  |  4.82<H>    Ca    7.8<L>      05 Feb 2020 05:57  Phos  4.2     02-05  Mg     2.1     02-05    TPro  3.3<L>  /  Alb  1.2<L>  /  TBili  0.4  /  DBili  0.2  /  AST  12  /  ALT  <5<L>  /  AlkPhos  114  02-05      REVIEW OF SYSTEMS  Gen: + weakness   Skin: No rashes  Head/Eyes/Ears/Mouth: No headache; Normal hearing; Normal vision w/o blurriness; No sinus pain/discomfort, sore throat  Respiratory: no SOB  CV: No chest pain, PND, orthopnea  GI: incisional tenderness   : anuric  MSK: c/o LUE pain and tenderness but reports improvement.   Neuro: No dizziness/lightheadedness, weakness, seizures, numbness, tingling  Heme: No easy bruising or bleeding  Endo: No heat/cold intolerance  Psych: No significant nervousness, anxiety, stress, depression      PHYSICAL EXAM:    Constitutional: NAD  	Eyes: Anicteric, PERRLA  	Respiratory: CTA b/l  	Cardiovascular: RRR  	Gastrointestinal: Soft abdomen, appropriate incisional TTP, ND. Wound vac ss/murky output, RLQ drain with SS drainage    Genitourinary:  anuric, large amount scrotal edema     Extremities: LUE and left chest with +3 edema; less firm and tender to touch as compared to yesterday; superficial skin tear on LUEb/l 2+ lower ext edema with TEDS stockings, + flank edema, RUE edema also improving.   	Vascular: DP signal bilateral, L AVF palpable  	Neurological: A&O x3.  	Skin: no new lesions/ulcerations  	Musculoskeletal: Moving all extremities

## 2020-02-05 NOTE — PROGRESS NOTE ADULT - PROBLEM SELECTOR PLAN 2
Eliquis held s/p arm/ chest hematoma. Has filter in place for PE and LE clot.  Will restart Eliquis once Hb is stable and there is no concern for active bleeding

## 2020-02-05 NOTE — PROGRESS NOTE ADULT - SUBJECTIVE AND OBJECTIVE BOX
[facility] NEPHROLOGY SERVICES, [facility]  NEPHROLOGY AND HYPERTENSION  [address]  [address]  [place] [address]  [phone]    MD GIANA GUERRERO, MD JANAE MCDONNELL, MD BARBARA MONET, MD SALUD GARZA, MD CATRINA WOO MD    Awake, no distress    Vital Signs Last 24 Hrs  T(C): 37.3 (02-05-20 @ 15:00), Max: 37.4 (02-05-20 @ 07:00)  T(F): 99.1 (02-05-20 @ 15:00), Max: 99.4 (02-05-20 @ 07:00)  HR: 72 (02-05-20 @ 17:00) (68 - 88)  BP: 126/63 (02-05-20 @ 17:00) (86/51 - 138/74)  BP(mean): 89 (02-05-20 @ 17:00) (64 - 100)  RR: 14 (02-05-20 @ 17:00) (9 - 23)  SpO2: 96% (02-05-20 @ 17:00) (93% - 100%)    Respiratory: decreased BS at bases  Cardiovascular: S1 S2  Gastrointestinal: soft NT ND +BS  Extremities:  LUE wrapped ; b/l LE edema    acetaminophen   Tablet .. 975 milliGRAM(s) Oral every 6 hours PRN  allopurinol 100 milliGRAM(s) Oral daily  aMIOdarone    Tablet 200 milliGRAM(s) Oral daily  brimonidine 0.2% Ophthalmic Solution 1 Drop(s) Both EYES three times a day  chlorhexidine 2% Cloths 1 Application(s) Topical <User Schedule>  epoetin trey Injectable 15791 Unit(s) IV Push <User Schedule>  famotidine    Tablet 20 milliGRAM(s) Oral daily  gabapentin 100 milliGRAM(s) Oral every 12 hours  latanoprost 0.005% Ophthalmic Solution 1 Drop(s) Both EYES daily  meropenem  IVPB 500 milliGRAM(s) IV Intermittent every 24 hours  micafungin IVPB 100 milliGRAM(s) IV Intermittent every 24 hours  Nephro-candace 1 Tablet(s) Oral daily  oxyCODONE    IR 10 milliGRAM(s) Oral every 6 hours PRN  oxyCODONE    IR 5 milliGRAM(s) Oral every 4 hours PRN  polyethylene glycol 3350 17 Gram(s) Oral daily  senna 2 Tablet(s) Oral at bedtime                        6.8    17.45 )-----------( 432      ( 05 Feb 2020 14:06 )             21.3     05 Feb 2020 05:57    135    |  98     |  21     ----------------------------<  103    4.4     |  24     |  4.82     Ca    7.8        05 Feb 2020 05:57  Phos  4.2       05 Feb 2020 05:57  Mg     2.1       05 Feb 2020 05:57    TPro  3.3    /  Alb  1.2    /  TBili  0.4    /  DBili  0.2    /  AST  12     /  ALT  <5     /  AlkPhos  114    05 Feb 2020 04:21    LIVER FUNCTIONS - ( 05 Feb 2020 04:21 )  Alb: 1.2 g/dL / Pro: 3.3 g/dL / ALK PHOS: 114 U/L / ALT: <5 U/L / AST: 12 U/L / GGT: x           PT/INR - ( 05 Feb 2020 04:21 )   PT: 14.3 sec;   INR: 1.24 ratio      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, DGF  S/p transplant kidney bx 12/13: ATN, focal TMA  S/p PLEX 12/13, 12/15; d/c-d 12/20 w/op HD 3 x wk  Re-adm 12/27 w/Flu A, infected carlos-nephric hematoma   S/p OR 1/1 for washout and repeat renal graft biopsy (ATN)  Dx w/RLE DVT  Developed gross hematuria  S/p OR 1/10 for infected carlos-nephric hematoma, hemorrhage involving pseudo-aneurysm of anastomosis of renal artery to external iliac vein,   s/p transplant nephrectomy, s/p IVCF   S/p PEA arrest 1/13, dx w/PE, R retroperitoneal collection  S/p IR drainage of abd collection  RUE DVT, on AC  Abx per ID  AVF infiltration 2/1 w/hematoma extending to chest while on Eliquis, s/p PRBCs  HD tomorrow via catheter  F[facility]u Hgb  [facility]    [phone]

## 2020-02-05 NOTE — PROGRESS NOTE ADULT - SUBJECTIVE AND OBJECTIVE BOX
[facility] DIVISION OF KIDNEY DISEASES AND HYPERTENSION -- FOLLOW UP NOTE  --------------------------------------------------------------------------------  Chief Complaint: fever    24 hour events/subjective:  Patient was seen and examined at bedside  denies any complaints.   underwent IHD yesterday, tolerated with no issues.   LUE swelling has improved.       PAST HISTORY  --------------------------------------------------------------------------------  No significant changes to PMH, PSH, FHx, SHx, unless otherwise noted    ALLERGIES & MEDICATIONS  --------------------------------------------------------------------------------  Allergies    coconut (Anaphylaxis)  morphine (Pruritus; Rash)    Intolerances      Standing Inpatient Medications  allopurinol 100 milliGRAM(s) Oral daily  aMIOdarone    Tablet 200 milliGRAM(s) Oral daily  brimonidine 0.2% Ophthalmic Solution 1 Drop(s) Both EYES three times a day  chlorhexidine 2% Cloths 1 Application(s) Topical <User Schedule>  epoetin trey Injectable 51989 Unit(s) IV Push <User Schedule>  famotidine    Tablet 20 milliGRAM(s) Oral daily  gabapentin 100 milliGRAM(s) Oral every 12 hours  latanoprost 0.005% Ophthalmic Solution 1 Drop(s) Both EYES daily  meropenem  IVPB 500 milliGRAM(s) IV Intermittent every 24 hours  micafungin IVPB 100 milliGRAM(s) IV Intermittent every 24 hours  Nephro-candace 1 Tablet(s) Oral daily  polyethylene glycol 3350 17 Gram(s) Oral daily  senna 2 Tablet(s) Oral at bedtime    PRN Inpatient Medications  acetaminophen   Tablet .. 975 milliGRAM(s) Oral every 6 hours PRN  oxyCODONE    IR 10 milliGRAM(s) Oral every 6 hours PRN  oxyCODONE    IR 5 milliGRAM(s) Oral every 4 hours PRN      REVIEW OF SYSTEMS  --------------------------------------------------------------------------------  Gen: No fatigue, fevers/chills, weakness  Skin: No rashes  Head/Eyes/Ears/Mouth: No headache;No sore throat  Respiratory: No dyspnea, cough,   CV: No chest pain, PND, orthopnea  GI: No abdominal pain, diarrhea, constipation, nausea, vomiting  : No increased frequency, dysuria, hematuria, nocturia  MSK: No joint pain/swelling; no back pain; no edema  Neuro: No dizziness/lightheadedness, weakness, seizures, numbness, tingling  Psych: No significant nervousness, anxiety, stress, depression    All other systems were reviewed and are negative, except as noted.    VITALS/PHYSICAL EXAM  --------------------------------------------------------------------------------  T(C): 37.4 (02-05-20 @ 11:00), Max: 37.4 (02-05-20 @ 07:00)  HR: 71 (02-05-20 @ 13:00) (66 - 88)  BP: 101/55 (02-05-20 @ 13:00) (86/51 - 138/74)  RR: 18 (02-05-20 @ 13:00) (9 - 23)  SpO2: 98% (02-05-20 @ 13:00) (93% - 100%)  Wt(kg): --        02-04-20 @ 07:01  -  02-05-20 @ 07:00  --------------------------------------------------------  IN: 150 mL / OUT: 1415 mL / NET: -1265 mL    02-05-20 @ 07:01  -  02-05-20 @ 13:41  --------------------------------------------------------  IN: 580 mL / OUT: 0 mL / NET: 580 mL      Physical Exam:  	Gen: NAD  	HEENT: PERRL, supple neck, clear oropharynx  	Pulm: CTA B/L  	CV: RRR, S1S2; no rub  	Back: No spinal or CVA tenderness; no sacral edema  	Abd: +BS, soft, nontender/nondistended                      Transplant: No tenderness, swelling  	: No suprapubic tenderness  	UE: 2+ edema over LUE  	LE: 2-3+ edema over b/l LE   	Neuro: No focal deficits  	Psych: Normal affect and mood  	Skin: Warm, without rashes      LABS/STUDIES  --------------------------------------------------------------------------------              6.9    16.78 >-----------<  445      [02-05-20 @ 12:47]              21.7     135  |  98  |  21  ----------------------------<  103      [02-05-20 @ 05:57]  4.4   |  24  |  4.82        Ca     7.8     [02-05-20 @ 05:57]      Mg     2.1     [02-05-20 @ 05:57]      Phos  4.2     [02-05-20 @ 05:57]    TPro  3.3  /  Alb  1.2  /  TBili  0.4  /  DBili  0.2  /  AST  12  /  ALT  <5  /  AlkPhos  114  [02-05-20 @ 04:21]    PT/INR: PT 14.3 , INR 1.24       [02-05-20 @ 04:21]  PTT: 33.8       [02-05-20 @ 04:21]      Creatinine Trend:  SCr 4.82 [02-05 @ 05:57]  SCr 2.90 [02-05 @ 04:21]  SCr 7.62 [02-04 @ 14:58]  SCr 7.19 [02-04 @ 02:26]  SCr 7.11 [02-04 @ 00:29]          BK Virus DNA by PCR:   NotDete Assay Range: 39 copies/mL to 1.00E+10 copies/mL  The limit of quantitation (LOQ) is 39 copies/mL. BK virus DNA  detected below the LOQ will be reported as Detected:<39 copies/mL.  This test was developed and its performance characteristics  determined by [facility]. It has not been cleared or approved  by the U.S. Food and Drug Administration. Results should be used in  conjunction with clinical findings, and should not form the sole  basis for a diagnosis or treatment decision.  ____________________________________________________________  Performed at:  [facility]  [address]  [place] [address]  Laura Pettit PhD HCLD(ABB)  CLIA# [id number] (01-14 @ 11:42)          Iron 23, TIBC 189, %sat 12      [01-27-20 @ 14:03]  HbA1c 5.2      [01-13-20 @ 22:59]    HBsAb 351.0      [01-11-20 @ 16:09]  HBsAg Nonreact      [01-11-20 @ 16:09]  HBcAb Nonreact      [01-11-20 @ 16:09]  HCV 0.09, Nonreact      [01-11-20 @ 16:09]

## 2020-02-05 NOTE — PROGRESS NOTE ADULT - ASSESSMENT
48 y/o male presenting with influenza A with a hospital course complicated by Infected transplant perinephric hematoma s/p right groin exploration, washout, & biopsy c/b persistent perinephric collection s/p FNA, Right external iliac & common femoral DVTs s/p IVC filter & thrombectomy of the RLE veins, Gross hematuria w/ pseudoaneurysm of the right external iliac & transplant renal artery seen on IR angiogram s/p right groin exploration, washout of the RP space, and transplant nephrectomy w/ repair of the right external iliac artery w/ a bovine patch, Iliac fossa collection s/p IR drainage, Cardiac arrest secondary to a saddle PE, now with L infiltration with likely small amount of ongoing active bleeding with large LUE hematoma while patient was on therapeutic anticoagulation.      - Pain control  - Trend H&H  - Consider resuming AC once CBC stable x48 hrs  - f/u repeat blood Cx, once negative, plan for permacath exchange  - continue with compression and elevation  - continue to monitor neurovascular exam. no neurovascular deficits noted at this time   - continue excellent care per sicu / transplant      VASCULAR SURGERY [phone]

## 2020-02-05 NOTE — PROGRESS NOTE ADULT - ASSESSMENT
Sai Prado is a 48 y/o male presenting with influenza A with a hospital course complicated by:  - Infected transplant perinephric hematoma s/p right groin exploration, washout, & biopsy c/b persistent perinephric collection s/p FNA  - Right external iliac & common femoral DVTs s/p IVC filter & thrombectomy of the RLE veins  - Gross hematuria w/ pseudoaneurysm of the right external iliac & transplant renal artery seen on IR angiogram s/p right groin exploration, washout of the RP space, and transplant nephrectomy w/ repair of the right external iliac artery w/ a bovine patch  - Iliac fossa collection s/p IR drainage  - Cardiac arrest secondary to a saddle PE    PLAN:  Neuro: No active issues.   - Pain control Tylenol, oxycodone, gabapentin, dilaudid   - LUE tenderness w/o neurological sx; continue to monitor    Resp: saddle PE with pulmonary infarct sequela. Largely resolved on most recent imaging  - No active issues.   - No further anticoagulation per Interventional Radiology recommendations, Hematology following    CV: obstructive shock secondary to PE (resolved), s/p PEA arrest with progression to torsades  - Monitor vital signs  - TTE (2/3) w/ grossly normal RV and LV  - Continue amiodarone prior Torsades  - ASA 81 qD  - Holding ASA 81 and metoprolol 12.5 BID.     GI: no acute issues  - Renal diet as tolerated.  - Continue pepcid.   - Bowel regimen with senna & Miralax.  - Continue wound vac to Right groin    Renal: CKD stage V s/p DDRT c/b DGF & perinephric collection s/p right groin exploration, washout, & biopsy c/b persistent perinephric collection s/p FNA; gross hematuria w/ pseudoaneurysm of the right external iliac & transplant renal artery s/p right groin exploration, washout of the RP space, & transplant nephrectomy w/ repair of the right external iliac artery w/ a bovine patch; persistent right iliac fossa collection s/p IR drainage  - Do not use LUE AVF  - ALCIDES nina for HD to remain in place while long term access pending  - On nephro-candace.     Heme: anemia of chronic diseases, saddle PE, right external iliac & common femoral DVTs s/p IVC filter  - S/p LUE arteriography with no bleeding vessel identified. Will continue to monitor CBCs.   - Holding Eliquis for now   - Continue Epogen.     ID: Enterobacter cloacae UTI, transplant perinephric collection and right iliac fossa collection with carbapenem resistant Enterobacter cloacae (CRE) and E. coli.  - On 6 week course of Meropenem, micafungin per Transplant, with eventual plans to switch to Per Os Ciprofloxacin on discharge.     Endo: No acute issues  - Monitor glucose on BMP    Misc: Gout, glaucoma  - Home allopurinol for gout  - Home eyedrops (latanoprost, dorzolamide, & brimonidine) for glaucoma    Disposition:  - Surgical Intensive Care Unit

## 2020-02-05 NOTE — PROGRESS NOTE ADULT - ASSESSMENT
49 year old male PMH ESRD on HD ( via LUE AVF) s/p DDRT 12/8/19 (CMV -/+, EBV -/+), NET of pancreas (s/p robotic distal panc/splenectomy 2015), RA with hand contractures who presented to [facility] on 12/27 with cough and fever. RVP with Influenza A s/p Tamiflu treatment course. Now with infected carlos-transplant hematoma.    1/1: s/p Ex-Lap and washout of infected hematoma (Enterobacter from cultures)  1/6: s/p IR guided drainage attempt of hematoma on 1/6 (NGTD from cultures)  1/10: s/p transplant nephrectomy, repair of right external iliac artery with a bovine pericardial patch, repair of right external iliac vein and thrombectomy of right lower extremity veins (Surgical cultures with Enterobacter)  1/15: s/p IR guided drainage (220cc of hematoma)  1/22: s/p IR drain repositioning and upsizing (Gram stain with GNR but so far NGTD)    Some of the Enterobacter isolates R and S to Ertapenem  per core lab Ertapenem at borderline of Susceptible and Resistant breakpoint  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   Given concern for intravascular infection I would treat for 6 weeks from 1/10 nephrectomy (End Date: 2/20/20)  I would continue Meropenem while admitted and switch to Ciprofloxacin on discharge    PEA arrest and Torsades on 1/13  CT C/A/P (1/13) with Saddle PE, interval increase in size of RP fluid collection (infected hematoma)  CT C/A/P (1/21) with pulmonary infarcts and residual hematoma (decreased in size)    Infiltrated LUE AVF with hematoma formation and active extravasation  Underwent IR angiography 9/3 with dissection of feeder artery with resolution of the pseudoaneurysm.  s/p R Chest Shiley Placement    Overall, LUE Hematoma, Leukocytosis (improving), Infected Hematoma, Saddle pulmonary embolus, Renal Transplant Recipient    --Continue Meropenem to 500 mg IV Q24H (anticipate switch to Cipro PO on discharge - End Date: 2/20/20)  --Would consider discontinuing Micafungin - no evidence of yeast in prior cultures.   --Continue to follow CBC with diff  --Continue to follow temperature curve  --Management of Saddle PE per Transplant and vascular teams    I will continue to follow. Please feel free to contact me with any further questions.    Dada Jacobo M.D.  [facility] Division of Infectious Disease  8AM-5PM: Pager Number [phone]  After Hours (or if no response): Please contact the Infectious Diseases Office at [phone]     The above assessment and plan were discussed with Dr. Cruz (Transplant Surgeon)

## 2020-02-05 NOTE — PROGRESS NOTE ADULT - ATTENDING COMMENTS
Pt seen and examined. Chart reviewed. Resident note confirmed. Pt is a 49 year old male with a medical history significant for CAD/HTN/cardiac arrest/PE/ESRD/DDRT (s/p explantation) who returned to [facility] with fever/leukocytosis. Work-up revealed a recurrent perinephric hematoma and led to DDRT explantation. Pt developed a DVT and a GFF was placed. Despite this, pt sustained a PE with cardiac arrest. He was anticoagulated and bled again. A/C was once again placed on hold. Pt underwent HD and bled into his arm. Angiogram was unrevealing. Pt underwent dialysis catheter placement and HD on 2/4.  Hgb 6.9 today    PMH/PSH/MEDS/ALL/SH/FH/ROS:  Unchanged from H&P  Vitals/PE/Labs/radiographs:  Reviewed    A/p    Neuro:	Post operative pain  	Continue pain control    CVS:	CAD/HTN  	H/o PE with cardiac arrest  	AC remains contraindicated  	Echo reviewed  	Continue asa/metoprolol    Pulm:	Atelectasis  	ISP    GI:	S/p ileus  	Reg diet as tolerated    :	ESRD  	Bleeding complication from AV fistula  	Continue HD via [facility]  	  Heme:	Acute blood loss anemia  	Repeat H/h   	Continue to monitor    ID:	Worsening leukocytosis  	Rising procalcitonin  	Suspected infected hematoma  	continue ABX per ID    Endo:	Continue glycemic control .

## 2020-02-05 NOTE — PROGRESS NOTE ADULT - ASSESSMENT
49 year old male with ESRD on HD, now s/p ddrt on 12/8/2019 course complicated by TMA/profound ATN, who presents from a HD center with a fever and cough. He was initially admitted with influenza. He is s/p washout of infected collection and biopsy which revealed ATN.  He was found to have diffusely enlarging perinephric fluid collection.    While on telemetry, he had an episode of a wide complex tachycardia. It initially appears irregular, suggesting an atrial arrhythmia with aberrancy, though the remainder appears more like an episode of non-sustained VT.   s/p washout on 1/1/2020 with worsening sepsis and transfer to ICU, s/p IR drainage of perinephric collection on 1/6. 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 He underwent operative repair of right external iliac artery with pericardial patch, transplant nephrectomy, RLE thrombectomy, and IVC filter on 1/9.     s/p corrina ->tachy arrest with tdp, af rvr, now on amio, with large PE    He has now returned to the SICU with significant lue swelling and active arterial extravasation, with drop in blood counts. He is s/p angiogram with tiny pseudoaneurysm.    - remains in sr, despite this  - had developed malignant arrhythmias which seemed to start with bradycardia, leading to more tachycardia and bursts of VT and rapid AF.  The apparent af degenerated into torsades.  He was shocked and has been maintaining SR since. Arrhythmias were most likely triggered by the massive vte event, are unlikely to represent a primary cardiac issue  - cont amio at 200 daily.   - hold metoprolol  - repeat ekg daily to evaluate qtc, while on antifungals/amiodarone  - hold asa     - was on apixaban for his PE  - this has been held in the setting of significant arterial extravasation at his fistula.  - trend CBC. Transfuse to keep hb >8  - cont to hold a/c  - echo with preserved/hyperdynamic lv, with RV failure consistent with acute RV overload.  Repeat echo noted, and seemingly with improved RV function    - Further cardiac workup will depend on clinical course.  - will follow with you

## 2020-02-05 NOTE — PROGRESS NOTE ADULT - SUBJECTIVE AND OBJECTIVE BOX
VASCULAR SURGERY DAILY PROGRESS NOTE:       SUBJECTIVE/ROS: Patient seen and examined at bedside.  Patient slightly lethargic this AM, hypotensive to 80s systolic.  Pain is controlled.    24 HOUR EVENTS:  - Shiley placed by IR  - Negative 1.4 L after HD  - CBC stable  - Leukocytosis persists, continues on Meropenem      MEDICATIONS  (STANDING):  allopurinol 100 milliGRAM(s) Oral daily  aMIOdarone    Tablet 200 milliGRAM(s) Oral daily  brimonidine 0.2% Ophthalmic Solution 1 Drop(s) Both EYES three times a day  chlorhexidine 2% Cloths 1 Application(s) Topical <User Schedule>  chlorhexidine 4% Liquid 1 Application(s) Topical <User Schedule>  epoetin trey Injectable 38484 Unit(s) IV Push <User Schedule>  famotidine    Tablet 20 milliGRAM(s) Oral daily  gabapentin 100 milliGRAM(s) Oral every 12 hours  latanoprost 0.005% Ophthalmic Solution 1 Drop(s) Both EYES daily  meropenem  IVPB 500 milliGRAM(s) IV Intermittent every 24 hours  micafungin IVPB 100 milliGRAM(s) IV Intermittent every 24 hours  Nephro-candace 1 Tablet(s) Oral daily  polyethylene glycol 3350 17 Gram(s) Oral daily  senna 2 Tablet(s) Oral at bedtime    MEDICATIONS  (PRN):  acetaminophen   Tablet .. 975 milliGRAM(s) Oral every 6 hours PRN Mild Pain (1 - 3)  oxyCODONE    IR 10 milliGRAM(s) Oral every 6 hours PRN Severe Pain (7 - 10)  oxyCODONE    IR 5 milliGRAM(s) Oral every 4 hours PRN Moderate Pain (4 - 6)  sodium chloride 0.9% lock flush 10 milliLiter(s) IV Push every 1 hour PRN Pre/post blood products, medications, blood draw, and to maintain line patency      OBJECTIVE:    Vital Signs Last 24 Hrs  T(C): 36.9 (2020 03:00), Max: 37.2 (2020 19:40)  T(F): 98.4 (2020 03:00), Max: 99 (2020 19:40)  HR: 76 (2020 07:00) (66 - 88)  BP: 118/60 (2020 07:00) (86/51 - 138/74)  BP(mean): 81 (2020 07:00) (64 - 100)  RR: 19 (2020 07:00) (9 - 21)  SpO2: 99% (2020 07:00) (93% - 100%)        I&O's Detail    2020 07:01  -  2020 07:00  --------------------------------------------------------  IN:    Solution: 100 mL    Solution: 50 mL  Total IN: 150 mL    OUT:    Bulb: 15 mL    Other: 1400 mL  Total OUT: 1415 mL    Total NET: -1265 mL          Daily     Daily Weight in k.8 (2020 23:15)    LABS:                        7.2    18.23 )-----------( 427      ( 2020 04:21 )             21.9     02-05    135  |  98  |  21  ----------------------------<  103<H>  4.4   |  24  |  4.82<H>    Ca    7.8<L>      2020 05:57  Phos  4.2     02-05  Mg     2.1     02-05    TPro  3.3<L>  /  Alb  1.2<L>  /  TBili  0.4  /  DBili  0.2  /  AST  12  /  ALT  <5<L>  /  AlkPhos  114  02-05    PT/INR - ( 2020 04:21 )   PT: 14.3 sec;   INR: 1.24 ratio         PTT - ( 2020 04:21 )  PTT:33.8 sec              PHYSICAL EXAM:  Constitutional: resting in bed, NAD  Respiratory: breathing comfortably on RA  Ext: Left arm wrapped in ACE; palpable radial pulse, full ROM, neurovascular grossly intact, compartments soft; palpable pedal pulses b/l 2+ edema, full ROM

## 2020-02-06 LAB
ALBUMIN SERPL ELPH-MCNC: 2 G/DL — LOW (ref 3.3–5)
ALBUMIN SERPL ELPH-MCNC: 2.1 G/DL — LOW (ref 3.3–5)
ALP SERPL-CCNC: 193 U/L — HIGH (ref 40–120)
ALP SERPL-CCNC: 197 U/L — HIGH (ref 40–120)
ALT FLD-CCNC: 6 U/L — LOW (ref 10–45)
ALT FLD-CCNC: <5 U/L — LOW (ref 10–45)
ANION GAP SERPL CALC-SCNC: 11 MMOL/L — SIGNIFICANT CHANGE UP (ref 5–17)
ANION GAP SERPL CALC-SCNC: 15 MMOL/L — SIGNIFICANT CHANGE UP (ref 5–17)
APTT BLD: 32.1 SEC — SIGNIFICANT CHANGE UP (ref 27.5–36.3)
APTT BLD: 33.2 SEC — SIGNIFICANT CHANGE UP (ref 27.5–36.3)
AST SERPL-CCNC: 18 U/L — SIGNIFICANT CHANGE UP (ref 10–40)
AST SERPL-CCNC: 19 U/L — SIGNIFICANT CHANGE UP (ref 10–40)
BILIRUB DIRECT SERPL-MCNC: 0.3 MG/DL — HIGH (ref 0–0.2)
BILIRUB DIRECT SERPL-MCNC: 0.4 MG/DL — HIGH (ref 0–0.2)
BILIRUB INDIRECT FLD-MCNC: 0.4 MG/DL — SIGNIFICANT CHANGE UP (ref 0.2–1)
BILIRUB INDIRECT FLD-MCNC: 0.4 MG/DL — SIGNIFICANT CHANGE UP (ref 0.2–1)
BILIRUB SERPL-MCNC: 0.7 MG/DL — SIGNIFICANT CHANGE UP (ref 0.2–1.2)
BILIRUB SERPL-MCNC: 0.8 MG/DL — SIGNIFICANT CHANGE UP (ref 0.2–1.2)
BUN SERPL-MCNC: 18 MG/DL — SIGNIFICANT CHANGE UP (ref 7–23)
BUN SERPL-MCNC: 28 MG/DL — HIGH (ref 7–23)
CALCIUM SERPL-MCNC: 8.1 MG/DL — LOW (ref 8.4–10.5)
CALCIUM SERPL-MCNC: 8.4 MG/DL — SIGNIFICANT CHANGE UP (ref 8.4–10.5)
CHLORIDE SERPL-SCNC: 96 MMOL/L — SIGNIFICANT CHANGE UP (ref 96–108)
CHLORIDE SERPL-SCNC: 99 MMOL/L — SIGNIFICANT CHANGE UP (ref 96–108)
CO2 SERPL-SCNC: 24 MMOL/L — SIGNIFICANT CHANGE UP (ref 22–31)
CO2 SERPL-SCNC: 26 MMOL/L — SIGNIFICANT CHANGE UP (ref 22–31)
CREAT SERPL-MCNC: 4.33 MG/DL — HIGH (ref 0.5–1.3)
CREAT SERPL-MCNC: 5.7 MG/DL — HIGH (ref 0.5–1.3)
GLUCOSE SERPL-MCNC: 104 MG/DL — HIGH (ref 70–99)
GLUCOSE SERPL-MCNC: 119 MG/DL — HIGH (ref 70–99)
HCT VFR BLD CALC: 23.5 % — LOW (ref 39–50)
HCT VFR BLD CALC: 24.4 % — LOW (ref 39–50)
HCT VFR BLD CALC: 25 % — LOW (ref 39–50)
HGB BLD-MCNC: 7.8 G/DL — LOW (ref 13–17)
HGB BLD-MCNC: 8 G/DL — LOW (ref 13–17)
HGB BLD-MCNC: 8.1 G/DL — LOW (ref 13–17)
INR BLD: 1.2 RATIO — HIGH (ref 0.88–1.16)
INR BLD: 1.21 RATIO — HIGH (ref 0.88–1.16)
MAGNESIUM SERPL-MCNC: 2 MG/DL — SIGNIFICANT CHANGE UP (ref 1.6–2.6)
MAGNESIUM SERPL-MCNC: 2.1 MG/DL — SIGNIFICANT CHANGE UP (ref 1.6–2.6)
MCHC RBC-ENTMCNC: 30 PG — SIGNIFICANT CHANGE UP (ref 27–34)
MCHC RBC-ENTMCNC: 30.5 PG — SIGNIFICANT CHANGE UP (ref 27–34)
MCHC RBC-ENTMCNC: 30.7 PG — SIGNIFICANT CHANGE UP (ref 27–34)
MCHC RBC-ENTMCNC: 32 GM/DL — SIGNIFICANT CHANGE UP (ref 32–36)
MCHC RBC-ENTMCNC: 33.2 GM/DL — SIGNIFICANT CHANGE UP (ref 32–36)
MCHC RBC-ENTMCNC: 33.2 GM/DL — SIGNIFICANT CHANGE UP (ref 32–36)
MCV RBC AUTO: 91.7 FL — SIGNIFICANT CHANGE UP (ref 80–100)
MCV RBC AUTO: 92.5 FL — SIGNIFICANT CHANGE UP (ref 80–100)
MCV RBC AUTO: 93.6 FL — SIGNIFICANT CHANGE UP (ref 80–100)
NRBC # BLD: 0 /100 WBCS — SIGNIFICANT CHANGE UP (ref 0–0)
PHOSPHATE SERPL-MCNC: 3.3 MG/DL — SIGNIFICANT CHANGE UP (ref 2.5–4.5)
PHOSPHATE SERPL-MCNC: 4.8 MG/DL — HIGH (ref 2.5–4.5)
PLATELET # BLD AUTO: 419 K/UL — HIGH (ref 150–400)
PLATELET # BLD AUTO: 430 K/UL — HIGH (ref 150–400)
PLATELET # BLD AUTO: 453 K/UL — HIGH (ref 150–400)
POTASSIUM SERPL-MCNC: 3.9 MMOL/L — SIGNIFICANT CHANGE UP (ref 3.5–5.3)
POTASSIUM SERPL-MCNC: 4.8 MMOL/L — SIGNIFICANT CHANGE UP (ref 3.5–5.3)
POTASSIUM SERPL-SCNC: 3.9 MMOL/L — SIGNIFICANT CHANGE UP (ref 3.5–5.3)
POTASSIUM SERPL-SCNC: 4.8 MMOL/L — SIGNIFICANT CHANGE UP (ref 3.5–5.3)
PROCALCITONIN SERPL-MCNC: 1.24 NG/ML — HIGH (ref 0.02–0.1)
PROCALCITONIN SERPL-MCNC: 1.42 NG/ML — HIGH (ref 0.02–0.1)
PROT SERPL-MCNC: 5.3 G/DL — LOW (ref 6–8.3)
PROT SERPL-MCNC: 5.9 G/DL — LOW (ref 6–8.3)
PROTHROM AB SERPL-ACNC: 13.7 SEC — HIGH (ref 10–12.9)
PROTHROM AB SERPL-ACNC: 14 SEC — HIGH (ref 10–12.9)
RBC # BLD: 2.54 M/UL — LOW (ref 4.2–5.8)
RBC # BLD: 2.66 M/UL — LOW (ref 4.2–5.8)
RBC # BLD: 2.67 M/UL — LOW (ref 4.2–5.8)
RBC # FLD: 16.3 % — HIGH (ref 10.3–14.5)
RBC # FLD: 16.4 % — HIGH (ref 10.3–14.5)
RBC # FLD: 16.4 % — HIGH (ref 10.3–14.5)
SODIUM SERPL-SCNC: 134 MMOL/L — LOW (ref 135–145)
SODIUM SERPL-SCNC: 137 MMOL/L — SIGNIFICANT CHANGE UP (ref 135–145)
WBC # BLD: 15.77 K/UL — HIGH (ref 3.8–10.5)
WBC # BLD: 15.83 K/UL — HIGH (ref 3.8–10.5)
WBC # BLD: 17.82 K/UL — HIGH (ref 3.8–10.5)
WBC # FLD AUTO: 15.77 K/UL — HIGH (ref 3.8–10.5)
WBC # FLD AUTO: 15.83 K/UL — HIGH (ref 3.8–10.5)
WBC # FLD AUTO: 17.82 K/UL — HIGH (ref 3.8–10.5)

## 2020-02-06 PROCEDURE — 99231 SBSQ HOSP IP/OBS SF/LOW 25: CPT

## 2020-02-06 PROCEDURE — 99232 SBSQ HOSP IP/OBS MODERATE 35: CPT

## 2020-02-06 PROCEDURE — ZZZZZ: CPT

## 2020-02-06 PROCEDURE — 99233 SBSQ HOSP IP/OBS HIGH 50: CPT

## 2020-02-06 RX ADMIN — Medication 1 TABLET(S): at 13:20

## 2020-02-06 RX ADMIN — MEROPENEM 100 MILLIGRAM(S): 1 INJECTION INTRAVENOUS at 13:20

## 2020-02-06 RX ADMIN — MICAFUNGIN SODIUM 105 MILLIGRAM(S): 100 INJECTION, POWDER, LYOPHILIZED, FOR SOLUTION INTRAVENOUS at 15:48

## 2020-02-06 RX ADMIN — OXYCODONE HYDROCHLORIDE 5 MILLIGRAM(S): 5 TABLET ORAL at 06:16

## 2020-02-06 RX ADMIN — HYDROMORPHONE HYDROCHLORIDE 0.5 MILLIGRAM(S): 2 INJECTION INTRAMUSCULAR; INTRAVENOUS; SUBCUTANEOUS at 16:39

## 2020-02-06 RX ADMIN — OXYCODONE HYDROCHLORIDE 5 MILLIGRAM(S): 5 TABLET ORAL at 21:05

## 2020-02-06 RX ADMIN — BRIMONIDINE TARTRATE 1 DROP(S): 2 SOLUTION/ DROPS OPHTHALMIC at 05:06

## 2020-02-06 RX ADMIN — Medication 100 MILLIGRAM(S): at 13:20

## 2020-02-06 RX ADMIN — ERYTHROPOIETIN 10000 UNIT(S): 10000 INJECTION, SOLUTION INTRAVENOUS; SUBCUTANEOUS at 10:31

## 2020-02-06 RX ADMIN — HYDROMORPHONE HYDROCHLORIDE 0.5 MILLIGRAM(S): 2 INJECTION INTRAMUSCULAR; INTRAVENOUS; SUBCUTANEOUS at 16:09

## 2020-02-06 RX ADMIN — Medication 975 MILLIGRAM(S): at 20:35

## 2020-02-06 RX ADMIN — GABAPENTIN 100 MILLIGRAM(S): 400 CAPSULE ORAL at 18:14

## 2020-02-06 RX ADMIN — AMIODARONE HYDROCHLORIDE 200 MILLIGRAM(S): 400 TABLET ORAL at 05:05

## 2020-02-06 RX ADMIN — LATANOPROST 1 DROP(S): 0.05 SOLUTION/ DROPS OPHTHALMIC; TOPICAL at 14:18

## 2020-02-06 RX ADMIN — OXYCODONE HYDROCHLORIDE 5 MILLIGRAM(S): 5 TABLET ORAL at 20:35

## 2020-02-06 RX ADMIN — OXYCODONE HYDROCHLORIDE 5 MILLIGRAM(S): 5 TABLET ORAL at 09:21

## 2020-02-06 RX ADMIN — GABAPENTIN 100 MILLIGRAM(S): 400 CAPSULE ORAL at 05:05

## 2020-02-06 RX ADMIN — Medication 975 MILLIGRAM(S): at 21:05

## 2020-02-06 RX ADMIN — BRIMONIDINE TARTRATE 1 DROP(S): 2 SOLUTION/ DROPS OPHTHALMIC at 14:18

## 2020-02-06 RX ADMIN — FAMOTIDINE 20 MILLIGRAM(S): 10 INJECTION INTRAVENOUS at 13:20

## 2020-02-06 RX ADMIN — OXYCODONE HYDROCHLORIDE 5 MILLIGRAM(S): 5 TABLET ORAL at 05:46

## 2020-02-06 RX ADMIN — CHLORHEXIDINE GLUCONATE 1 APPLICATION(S): 213 SOLUTION TOPICAL at 05:05

## 2020-02-06 RX ADMIN — OXYCODONE HYDROCHLORIDE 5 MILLIGRAM(S): 5 TABLET ORAL at 09:51

## 2020-02-06 NOTE — PROGRESS NOTE ADULT - ATTENDING COMMENTS
agree wth above  doing well  anticoagulation still on hold  transfused one unit  will continue to monitor

## 2020-02-06 NOTE — PROGRESS NOTE ADULT - SUBJECTIVE AND OBJECTIVE BOX
[facility]    Melissa Kelsey, Enrique, Maximino, Marin, Hamzah, Ravi      [phone]    CHIEF COMPLAINT: Patient is a 49y old  Male who presents with a chief complaint of Fever (06 Feb 2020 11:06)      Follow Up: vt/vf/af/tdp arrest, vol ol    Interim history: no new sxs. still with marked leg edema. denies sob    MEDICATIONS  (STANDING):  allopurinol 100 milliGRAM(s) Oral daily  aMIOdarone    Tablet 200 milliGRAM(s) Oral daily  brimonidine 0.2% Ophthalmic Solution 1 Drop(s) Both EYES three times a day  chlorhexidine 2% Cloths 1 Application(s) Topical <User Schedule>  epoetin trey Injectable 71932 Unit(s) IV Push <User Schedule>  famotidine    Tablet 20 milliGRAM(s) Oral daily  gabapentin 100 milliGRAM(s) Oral every 12 hours  latanoprost 0.005% Ophthalmic Solution 1 Drop(s) Both EYES daily  meropenem  IVPB 500 milliGRAM(s) IV Intermittent every 24 hours  micafungin IVPB 100 milliGRAM(s) IV Intermittent every 24 hours  Nephro-candace 1 Tablet(s) Oral daily  polyethylene glycol 3350 17 Gram(s) Oral daily  senna 2 Tablet(s) Oral at bedtime    MEDICATIONS  (PRN):  acetaminophen   Tablet .. 975 milliGRAM(s) Oral every 6 hours PRN Mild Pain (1 - 3)  HYDROmorphone  Injectable 0.5 milliGRAM(s) IV Push every 6 hours PRN Breakthrough Pain  oxyCODONE    IR 10 milliGRAM(s) Oral every 6 hours PRN Severe Pain (7 - 10)  oxyCODONE    IR 5 milliGRAM(s) Oral every 4 hours PRN Moderate Pain (4 - 6)      REVIEW OF SYSTEMS:  eye, ent, GI, , allergic, dermatologic, musculoskeletal and neurologic are negative except as described above    Vital Signs Last 24 Hrs  T(C): 37.6 (06 Feb 2020 10:27), Max: 37.7 (05 Feb 2020 19:00)  T(F): 99.7 (06 Feb 2020 10:27), Max: 99.9 (05 Feb 2020 19:00)  HR: 77 (06 Feb 2020 10:27) (69 - 83)  BP: 140/65 (06 Feb 2020 10:27) (92/51 - 146/67)  BP(mean): 93 (06 Feb 2020 10:27) (68 - 97)  RR: 23 (06 Feb 2020 10:27) (8 - 23)  SpO2: 100% (06 Feb 2020 10:27) (96% - 100%)    I&O's Summary    05 Feb 2020 07:01  -  06 Feb 2020 07:00  --------------------------------------------------------  IN: 1520 mL / OUT: 30 mL / NET: 1490 mL    06 Feb 2020 07:01  -  06 Feb 2020 11:45  --------------------------------------------------------  IN: 0 mL / OUT: 0 mL / NET: 0 mL        Telemetry past 24h: sr    PHYSICAL EXAM:    Constitutional: well-nourished, well-developed, NAD   HEENT:  MMM, sclerae anicteric, conjunctivae clear, no oral cyanosis.  Pulmonary: Non-labored, breath sounds are clear bilaterally, No wheezing, rales or rhonchi  Cardiovascular: Regular, S1 and S2.  No murmur.  No rubs, gallops or clicks  Gastrointestinal: Bowel Sounds present, soft, nontender.   Lymph: marked peripheral edema.   Neurological: Alert, no focal deficits  Skin: No rashes.  Psych:  Mood & affect appropriate    LABS: All Labs Reviewed:                        7.8    17.82 )-----------( 453      ( 06 Feb 2020 00:39 )             23.5                         8.0    17.41 )-----------( 431      ( 05 Feb 2020 19:32 )             24.5                         6.8    17.45 )-----------( 432      ( 05 Feb 2020 14:06 )             21.3     06 Feb 2020 00:39    134    |  99     |  28     ----------------------------<  119    4.8     |  24     |  5.70   05 Feb 2020 05:57    135    |  98     |  21     ----------------------------<  103    4.4     |  24     |  4.82   05 Feb 2020 04:21    143    |  117    |  13     ----------------------------<  65     2.7     |  17     |  2.90     Ca    8.1        06 Feb 2020 00:39  Ca    7.8        05 Feb 2020 05:57  Ca    4.6        05 Feb 2020 04:21  Phos  4.8       06 Feb 2020 00:39  Phos  4.2       05 Feb 2020 05:57  Phos  2.3       05 Feb 2020 04:21  Mg     2.1       06 Feb 2020 00:39  Mg     2.1       05 Feb 2020 05:57  Mg     1.2       05 Feb 2020 04:21    TPro  5.3    /  Alb  2.0    /  TBili  0.7    /  DBili  0.3    /  AST  19     /  ALT  6      /  AlkPhos  193    06 Feb 2020 00:39  TPro  3.3    /  Alb  1.2    /  TBili  0.4    /  DBili  0.2    /  AST  12     /  ALT  <5     /  AlkPhos  114    05 Feb 2020 04:21  TPro  5.0    /  Alb  1.9    /  TBili  0.7    /  DBili  0.3    /  AST  18     /  ALT  5      /  AlkPhos  186    04 Feb 2020 00:29    PT/INR - ( 06 Feb 2020 00:39 )   PT: 13.7 sec;   INR: 1.20 ratio         PTT - ( 06 Feb 2020 00:39 )  PTT:33.2 sec      Blood Culture:         RADIOLOGY:    EKG:    Echo:

## 2020-02-06 NOTE — PROGRESS NOTE ADULT - ATTENDING COMMENTS
Patient seen and examined and agree with above.   Remained hemodynamically stable overnight.  Responded to one unit PRBC overnight.   Femoral introducer discontinued this morning.    Awake, alert and oriented.   Normal respiratory state- room air SpO2 97%  Bilateral subsegmental lower lobe pulmonary emboli- will plan for eloquis tomorrow   Tolerating renal diet  Improving leukocytosis   esrd S/P transplant nephrectomy - will do HD today  Continue meropenem and micafungin and will follow up with ID and transplant.     Will plan for getting out of bed into chair today.

## 2020-02-06 NOTE — PROGRESS NOTE ADULT - SUBJECTIVE AND OBJECTIVE BOX
[facility], [facility]  NEPHROLOGY AND HYPERTENSION  [address]  [address]  [place] [address]  [phone]    MD GIANA GUERRERO, MD BEAN ARAUJO, MD JANAE VICTORIA, MD BARBARA MONET, MD SALUD GARZA, MD CATRINA WOO MD    Awake, no distress, seen on HD    Vital Signs Last 24 Hrs  T(C): 37.5 (02-06-20 @ 15:00), Max: 37.8 (02-06-20 @ 13:58)  T(F): 99.5 (02-06-20 @ 15:00), Max: 100 (02-06-20 @ 13:58)  HR: 82 (02-06-20 @ 15:00) (69 - 83)  BP: 152/71 (02-06-20 @ 15:00) (116/58 - 156/69)  BP(mean): 102 (02-06-20 @ 15:00) (81 - 106)  RR: 9 (02-06-20 @ 15:00) (7 - 23)  SpO2: 97% (02-06-20 @ 15:00) (96% - 100%)    Respiratory: decreased BS at bases  Cardiovascular: S1 S2  Gastrointestinal: soft NT ND +BS  Extremities:  LUE wrapped ; b/l LE edema    acetaminophen   Tablet .. 975 milliGRAM(s) Oral every 6 hours PRN  allopurinol 100 milliGRAM(s) Oral daily  aMIOdarone    Tablet 200 milliGRAM(s) Oral daily  brimonidine 0.2% Ophthalmic Solution 1 Drop(s) Both EYES three times a day  chlorhexidine 2% Cloths 1 Application(s) Topical <User Schedule>  epoetin trey Injectable 44802 Unit(s) IV Push <User Schedule>  famotidine    Tablet 20 milliGRAM(s) Oral daily  gabapentin 100 milliGRAM(s) Oral every 12 hours  HYDROmorphone  Injectable 0.5 milliGRAM(s) IV Push every 6 hours PRN  latanoprost 0.005% Ophthalmic Solution 1 Drop(s) Both EYES daily  meropenem  IVPB 500 milliGRAM(s) IV Intermittent every 24 hours  micafungin IVPB 100 milliGRAM(s) IV Intermittent every 24 hours  Nephro-candace 1 Tablet(s) Oral daily  oxyCODONE    IR 10 milliGRAM(s) Oral every 6 hours PRN  oxyCODONE    IR 5 milliGRAM(s) Oral every 4 hours PRN  polyethylene glycol 3350 17 Gram(s) Oral daily  senna 2 Tablet(s) Oral at bedtime                        8.1    15.83 )-----------( 419      ( 06 Feb 2020 12:14 )             24.4     06 Feb 2020 00:39    134    |  99     |  28     ----------------------------<  119    4.8     |  24     |  5.70     Ca    8.1        06 Feb 2020 00:39  Phos  4.8       06 Feb 2020 00:39  Mg     2.1       06 Feb 2020 00:39    TPro  5.3    /  Alb  2.0    /  TBili  0.7    /  DBili  0.3    /  AST  19     /  ALT  6      /  AlkPhos  193    06 Feb 2020 00:39    LIVER FUNCTIONS - ( 06 Feb 2020 00:39 )  Alb: 2.0 g/dL / Pro: 5.3 g/dL / ALK PHOS: 193 U/L / ALT: 6 U/L / AST: 19 U/L / GGT: x           PT/INR - ( 06 Feb 2020 00:39 )   PT: 13.7 sec;   INR: 1.20 ratio       PTT - ( 06 Feb 2020 00:39 )  PTT:33.2 sec  CAPILLARY BLOOD GLUCOSE    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, DGF  S/p transplant kidney bx 12/13: ATN, focal TMA  S/p PLEX 12/13, 12/15; d/c-d 12/20 w/op HD 3 x wk  Re-adm 12/27 w/Flu A, infected carlos-nephric hematoma   S/p OR 1/1 for washout and repeat renal graft biopsy (ATN)  Dx w/RLE DVT  Developed gross hematuria  S/p OR 1/10 for infected carlos-nephric hematoma, hemorrhage involving pseudo-aneurysm of anastomosis of renal artery to external iliac vein,   s/p transplant nephrectomy, s/p IVCF   S/p PEA arrest 1/13, + PE, R retroperitoneal collection  S/p IR drainage of abd collection  RUE DVT, AC  Abx per ID  AVF infiltration 2/1 w/hematoma extending to L chest wall while on Eliquis, s/p PRBCs  Off AC  HD today via temp HD catheter  F/u Hgb  Next HD Sat  Perm cath once cleared by ID    [phone]

## 2020-02-06 NOTE — PROGRESS NOTE ADULT - PROBLEM SELECTOR PLAN 1
Scheduled for IHD  today . On 2/1/2020 hew had a fistula infiltration/ hematoma which required  multiple transfusions and AC was held.  Seen by vascular - no intervention at this time.  Follows with primary nephrologist Dr Amaya for dialysis.

## 2020-02-06 NOTE — PROGRESS NOTE ADULT - SUBJECTIVE AND OBJECTIVE BOX
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 Patient was admitted and hospital course is as follows:    12/27 - started on Tamiflu, Duplex of the transplanted kidney demonstrated an increase in the perinephric hematoma from 5.7 cm to 9.4 cm  12/29 - patient reporting dysuria so urine culture sent, which was positive for Enterobacter cloacae  01/01 - s/p right groin exploration, washout of the perinephric hematoma, and biopsy of the transplanted kidney, cultures of the hematoma positive for Enterobacter cloacae & E. coli, biopsy demonstrates  01/05 - patient noted to be febrile and hypotensive with new episodes of wide complex tachycardia on telemetry, labs significant for worsening leukocytosis, cultures sent, CT scan obtained & demonstrated a persistent perinephric collection, admitted to SICU for hemodynamic monitoring  01/06 - FNA w/ IR with aspiration of 18 mL of clear & pale yellow fluid but cultures negative for any growth, patient began having gross hematuria requiring CBI  01/07 - repeat imaging demonstrated no change in the size of the perinephric collection  01/08 - RLE edema was noted so a LE Duplex was obtained, which demonstrated a right external iliac & common femoral vein DVT, vascular surgery consulted, patient started on a heparin infusion  01/10 - patient became altered with worsening hematuria despite CBI, patient was taken to IR for an angiogram, which demonstrated a pseudoaneurysm of the transplant renal artery & external iliac artery anastomosis so the pseudoaneurysm was stented & an IVC filter was placed, patient taken to the OR and is s/p right groin exploration, washout of the RP space, transplant nephrectomy w/ repair of the right external iliac artery w/ a bovine patch & removal of the stent placed by IR, and thrombectomy of the RLE veins, returned to SICU still intubated  01/13 - acutely bradycardic and hypotensive progressing into PEA arrest with progression into torsades requiring ACLS for a total of ~12 mins, imaging revealed saddle PE with TTE demonstrating RV strain, PERT team consulted, management with heparin infusion as patient is not a tPA candidate, did not require vasopressors shortly after ROSC  01/15 - ultrasound with large right iliac fossa collection s/p percutaneous drainage by IR, 220 mL of dark red fluid drained and 10 Fr drain was left in placed  01/16 - extubated  01/19 - RUE Duplex with thrombosis of the brachial & cephalic veins, RLQ ultrasound with interval decrease in right iliac fossa collection  01/21 - CTA chest and CT abdomen & pelvis obtained demonstrating known RLL and LLL lobar artery PEs but resolution of PE in bilateral pulmonary arteries, new BLL wedge-like opacities likely representing pulmonary infarcts, and interval decrease in RLQ fluid collection  01/22 - RLQ IR drain upsized by IR from 10 Fr to 12 Fr  01/23 - Changed heparin infusion to Eliquis, stable for transfer to floor on 1/25.   1/23-2/1: No acute issues, rehab planning. However on 2/1 while undergoing  hemodialysis through L AVF, had massive infiltration, transferred to Surgical Intensive Care Unit for q1h neurovascular checks with concern for upper arm compartment syndrome  2/2-2/3: IR LUE angiogram, tiny PSA from distal branches of deep brachial artery noted and embolized, no venous injury noted at AVF  2/4: ALCIDES nina placed as temporary HD access    24 HOUR EVENTS:  - Negative 1.4 L after HD  - CBC stable  - Leukocytosis persists, continues on Meropenem    SUBJECTIVE/ROS:  [x] A ten-point review of systems was otherwise negative except as noted.  [ ] Due to altered mental status/intubation, subjective information were not able to be obtained from the patient. History was obtained, to the extent possible, from review of the chart and collateral sources of information.      NEURO  Exam: A and O x 3, WHITING, FC  Meds: acetaminophen   Tablet .. 975 milliGRAM(s) Oral every 6 hours PRN Mild Pain (1 - 3)  gabapentin 100 milliGRAM(s) Oral every 12 hours  oxyCODONE    IR 10 milliGRAM(s) Oral every 6 hours PRN Severe Pain (7 - 10)  oxyCODONE    IR 5 milliGRAM(s) Oral every 4 hours PRN Moderate Pain (4 - 6)    [x] Adequacy of sedation and pain control has been assessed and adjusted      RESPIRATORY  RR: 19 (02-04-20 @ 23:15) (9 - 25)  SpO2: 97% (02-04-20 @ 23:15) (95% - 100%)  Wt(kg): --  Exam: unlabored, clear to auscultation bilaterally    CARDIOVASCULAR  HR: 81 (02-04-20 @ 23:15) (66 - 82)  BP: 128/74 (02-04-20 @ 23:15) (89/65 - 147/68)  BP(mean): 100 (02-04-20 @ 22:00) (64 - 100)    Exam: Regular rate and rhythm, Normal S1/S2  Cardiac Rhythm: NSR  Perfusion     [x]Adequate   [ ]Inadequate  Mentation   [x]Normal       [ ]Reduced  Extremities  [x]Warm         [ ]Cool  Volume Status [x]Hypervolemic [ ]Euvolemic [ ]Hypovolemic  Meds: aMIOdarone    Tablet 200 milliGRAM(s) Oral daily        GI/NUTRITION  Exam: Soft, NT, ND  Diet: Renal diet  Meds: famotidine    Tablet 20 milliGRAM(s) Oral daily  polyethylene glycol 3350 17 Gram(s) Oral daily  senna 2 Tablet(s) Oral at bedtime      GENITOURINARY  I&O's Detail    02-03 @ 07:01  -  02-04 @ 07:00  --------------------------------------------------------  IN:    Solution: 100 mL    Solution: 100 mL  Total IN: 200 mL    OUT:    Bulb: 35 mL    VAC (Vacuum Assisted Closure) System: 50 mL  Total OUT: 85 mL    Total NET: 115 mL      02-04 @ 07:01  -  02-05 @ 00:36  --------------------------------------------------------  IN:    Solution: 50 mL    Solution: 100 mL  Total IN: 150 mL    OUT:    Bulb: 5 mL    Other: 1400 mL  Total OUT: 1405 mL    Total NET: -1255 mL          02-04    133<L>  |  97  |  38<H>  ----------------------------<  89  5.2   |  24  |  7.62<H>    Ca    7.9<L>      04 Feb 2020 14:58  Phos  5.9     02-04  Mg     2.2     02-04    TPro  5.0<L>  /  Alb  1.9<L>  /  TBili  0.7  /  DBili  0.3<H>  /  AST  18  /  ALT  5<L>  /  AlkPhos  186<H>  02-04    [ ] Martinez catheter, indication: N/A  Meds: Nephro-candace 1 Tablet(s) Oral daily  sodium chloride 0.9% lock flush 10 milliLiter(s) IV Push every 1 hour PRN Pre/post blood products, medications, blood draw, and to maintain line patency        HEMATOLOGIC  Meds:   [x] VTE Prophylaxis                        7.5    21.20 )-----------( 428      ( 04 Feb 2020 18:13 )             23.0     PT/INR - ( 04 Feb 2020 00:28 )   PT: 14.4 sec;   INR: 1.24 ratio         PTT - ( 04 Feb 2020 00:28 )  PTT:34.3 sec  Transfusion     [ ] PRBC   [ ] Platelets   [ ] FFP   [ ] Cryoprecipitate      INFECTIOUS DISEASES  T(C): 36.4 (02-04-20 @ 23:15), Max: 37.2 (02-04-20 @ 07:00)  Wt(kg): --  WBC Count: 21.20 K/uL (02-04 @ 18:13)  WBC Count: 22.00 K/uL (02-04 @ 08:46)    Recent Cultures:    Meds: epoetin trey Injectable 72950 Unit(s) IV Push <User Schedule>  meropenem  IVPB 500 milliGRAM(s) IV Intermittent every 24 hours  micafungin IVPB 100 milliGRAM(s) IV Intermittent every 24 hours        ENDOCRINE  Capillary Blood Glucose    Meds: allopurinol 100 milliGRAM(s) Oral daily        ACCESS DEVICES:  [x] Peripheral IV  [x] Central Venous Line	[ ] R	[ ] L	[x] IJ	[ ] Fem	[ ] SC	Placed: 2/4  [ ] Arterial Line		[ ] R	[ ] L	[ ] Fem	[ ] Rad	[ ] Ax	Placed:   [ ] PICC:					[ ] Mediport  [ ] Urinary Catheter, Date Placed:   [x] Necessity of urinary, arterial, and venous catheters discussed    OTHER MEDICATIONS:  brimonidine 0.2% Ophthalmic Solution 1 Drop(s) Both EYES three times a day  chlorhexidine 2% Cloths 1 Application(s) Topical <User Schedule>  chlorhexidine 4% Liquid 1 Application(s) Topical <User Schedule>  latanoprost 0.005% Ophthalmic Solution 1 Drop(s) Both EYES daily          ********************************************************************************************************************      SURGICAL INTENSIVE CARE UNIT DAILY PROGRESS NOTE    24 HOUR EVENTS:     HPI:  HPI:  49M PMHx of ESRD on HD  ( via LUE AVF), HTN, Gout, Glaucoma, NET of pancreas (s/p robotic distal panc/splenectomy at [place] 2015 by Dr. Young, followed by Dr. Raphael, [facility] Oncologist), RA with hand contractures. He underwent DDRT on 12/8/19 (ureteral stent sutured to Martinez - removed 12/15). Post op course c/b DGF requiring HD, thrombocytopenia, elevated LDH and Haptoglobulin. Schistocytes  on peripheral smear concerning for TMA. Envarsus held. Received Belatacept 12/13. Started on PLEX (12/12, 12/15). Underwent renal bx on 12/13 c/w profound ATN.  Found to have much improvement to levels, likely related to Tacrolimus. He was discharged home on 12/20/19 w/ outpatient HD.     He was now sent to ED by dialysis center after he was found to be febrile to 102, he did not get HD this AM, and last full HD session was on 12/26. Upon arrival, he was febrile to 101.7, other vitals were stable. He states that he had a cough that began 5-6 days ago that has now mostly resolved. He reports no other febrile episodes other than this morning. He admits to having one SOB episode earlier this week that resolved after he got HD. He also states that his mother was hospitalized earlier this week and was flu+. Currently makes less than 1 cup urine/daily. He currently denies SOB, CP, dyspnea, HA, dizziness, N/V, diarrhea, constipation.     Recipient info:   CPRA:    0%  ABO:      A+  CMVAb:  Positive  EBVIgG Status:  Positive  Last HD:  12/7/2019    Donor ID:  [id number]  Match: [id number]  OPO: NYRT  Age:  49  ABO:  A  KDPI 81%  COD:  Anoxia  X Clamp Time:  12/7/2019 1538.  Medical Hx: DM, HTN, HLD, obesity BMI 53, CAD, CABAGX3  Terminal Cr:  1/1.8/1.7  CMV- Neg EBV- Neg    OR:    DDRT to right iliac fossa. Simulect induction. Two arteries anastomosed to a single cuff on the back table. One vein, one ureter. Ureteral anastomosis performed over a double J stent, which was sutured to the martinez.   Cold ischemia time 25.5 hours. (27 Dec 2019 10:45)      NEURO  RASS:     GCS:     CAM ICU:  Exam:   Meds: acetaminophen   Tablet .. 975 milliGRAM(s) Oral every 6 hours PRN Mild Pain (1 - 3)  gabapentin 100 milliGRAM(s) Oral every 12 hours  HYDROmorphone  Injectable 0.5 milliGRAM(s) IV Push every 6 hours PRN Breakthrough Pain  oxyCODONE    IR 10 milliGRAM(s) Oral every 6 hours PRN Severe Pain (7 - 10)  oxyCODONE    IR 5 milliGRAM(s) Oral every 4 hours PRN Moderate Pain (4 - 6)    [x] Adequacy of sedation and pain control has been assessed and adjusted      RESPIRATORY  RR: 19 (02-05-20 @ 23:00) (10 - 23)  SpO2: 96% (02-05-20 @ 23:00) (93% - 100%)  Wt(kg): --  Exam: unlabored, clear to auscultation bilaterally  Mechanical Ventilation:     [ ] Extubation Readiness Assessed  Meds:       CARDIOVASCULAR  HR: 71 (02-05-20 @ 23:00) (69 - 85)  BP: 127/60 (02-05-20 @ 23:00) (86/51 - 146/67)  BP(mean): 86 (02-05-20 @ 23:00) (64 - 98)  ABP: --  ABP(mean): --  Wt(kg): --  CVP(cm H2O): --      Exam:  Cardiac Rhythm:  Perfusion     [ ]Adequate   [ ]Inadequate  Mentation   [ ]Normal       [ ]Reduced  Extremities  [ ]Warm         [ ]Cool  Volume Status [ ]Hypervolemic [ ]Euvolemic [ ]Hypovolemic  Meds: aMIOdarone    Tablet 200 milliGRAM(s) Oral daily        GI/NUTRITION  Exam:  Diet:  Meds: famotidine    Tablet 20 milliGRAM(s) Oral daily  polyethylene glycol 3350 17 Gram(s) Oral daily  senna 2 Tablet(s) Oral at bedtime      GENITOURINARY  I&O's Detail    02-04 @ 07:01  -  02-05 @ 07:00  --------------------------------------------------------  IN:    Solution: 100 mL    Solution: 50 mL  Total IN: 150 mL    OUT:    Bulb: 15 mL    Other: 1400 mL  Total OUT: 1415 mL    Total NET: -1265 mL      02-05 @ 07:01  -  02-06 @ 00:57  --------------------------------------------------------  IN:    Oral Fluid: 720 mL    Packed Red Blood Cells: 350 mL    Solution: 100 mL  Total IN: 1170 mL    OUT:    Bulb: 10 mL  Total OUT: 10 mL    Total NET: 1160 mL          02-05    135  |  98  |  21  ----------------------------<  103<H>  4.4   |  24  |  4.82<H>    Ca    7.8<L>      05 Feb 2020 05:57  Phos  4.2     02-05  Mg     2.1     02-05    TPro  3.3<L>  /  Alb  1.2<L>  /  TBili  0.4  /  DBili  0.2  /  AST  12  /  ALT  <5<L>  /  AlkPhos  114  02-05    [ ] Martinez catheter, indication: N/A  Meds: Nephro-candace 1 Tablet(s) Oral daily        HEMATOLOGIC  Meds:   [x] VTE Prophylaxis                        8.0    17.41 )-----------( 431      ( 05 Feb 2020 19:32 )             24.5     PT/INR - ( 05 Feb 2020 04:21 )   PT: 14.3 sec;   INR: 1.24 ratio         PTT - ( 05 Feb 2020 04:21 )  PTT:33.8 sec  Transfusion     [ ] PRBC   [ ] Platelets   [ ] FFP   [ ] Cryoprecipitate      INFECTIOUS DISEASES  T(C): 37.3 (02-05-20 @ 23:00), Max: 37.7 (02-05-20 @ 19:00)  Wt(kg): --  WBC Count: 17.41 K/uL (02-05 @ 19:32)  WBC Count: 17.45 K/uL (02-05 @ 14:06)  WBC Count: 16.78 K/uL (02-05 @ 12:47)  WBC Count: 18.23 K/uL (02-05 @ 04:21)    Recent Cultures:    Meds: epoetin trey Injectable 31886 Unit(s) IV Push <User Schedule>  meropenem  IVPB 500 milliGRAM(s) IV Intermittent every 24 hours  micafungin IVPB 100 milliGRAM(s) IV Intermittent every 24 hours        ENDOCRINE  Capillary Blood Glucose    Meds: allopurinol 100 milliGRAM(s) Oral daily        ACCESS DEVICES:  [ ] Peripheral IV  [ ] Central Venous Line	[ ] R	[ ] L	[ ] IJ	[ ] Fem	[ ] SC	Placed:   [ ] Arterial Line		[ ] R	[ ] L	[ ] Fem	[ ] Rad	[ ] Ax	Placed:   [ ] PICC:					[ ] Mediport  [ ] Urinary Catheter, Date Placed:   [ ] Necessity of urinary, arterial, and venous catheters discussed    OTHER MEDICATIONS:  brimonidine 0.2% Ophthalmic Solution 1 Drop(s) Both EYES three times a day  chlorhexidine 2% Cloths 1 Application(s) Topical <User Schedule>  latanoprost 0.005% Ophthalmic Solution 1 Drop(s) Both EYES daily      CODE STATUS:     IMAGING: SURGICAL INTENSIVE CARE UNIT DAILY PROGRESS NOTE    24 HOUR EVENTS:  -- S/p 1 unit pRBC after Hb drop to <7.0 g/dL. Patient was asymptomatic.  -- No other acute events overnight.      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 Patient was admitted and hospital course is as follows:    12/27 - started on Tamiflu, Duplex of the transplanted kidney demonstrated an increase in the perinephric hematoma from 5.7 cm to 9.4 cm  12/29 - patient reporting dysuria so urine culture sent, which was positive for Enterobacter cloacae  01/01 - s/p right groin exploration, washout of the perinephric hematoma, and biopsy of the transplanted kidney, cultures of the hematoma positive for Enterobacter cloacae & E. coli, biopsy demonstrates  01/05 - patient noted to be febrile and hypotensive with new episodes of wide complex tachycardia on telemetry, labs significant for worsening leukocytosis, cultures sent, CT scan obtained & demonstrated a persistent perinephric collection, admitted to SICU for hemodynamic monitoring  01/06 - FNA w/ IR with aspiration of 18 mL of clear & pale yellow fluid but cultures negative for any growth, patient began having gross hematuria requiring CBI  01/07 - repeat imaging demonstrated no change in the size of the perinephric collection  01/08 - RLE edema was noted so a LE Duplex was obtained, which demonstrated a right external iliac & common femoral vein DVT, vascular surgery consulted, patient started on a heparin infusion  01/10 - patient became altered with worsening hematuria despite CBI, patient was taken to IR for an angiogram, which demonstrated a pseudoaneurysm of the transplant renal artery & external iliac artery anastomosis so the pseudoaneurysm was stented & an IVC filter was placed, patient taken to the OR and is s/p right groin exploration, washout of the RP space, transplant nephrectomy w/ repair of the right external iliac artery w/ a bovine patch & removal of the stent placed by IR, and thrombectomy of the RLE veins, returned to SICU still intubated  01/13 - acutely bradycardic and hypotensive progressing into PEA arrest with progression into torsades requiring ACLS for a total of ~12 mins, imaging revealed saddle PE with TTE demonstrating RV strain, PERT team consulted, management with heparin infusion as patient is not a tPA candidate, did not require vasopressors shortly after ROSC  01/15 - ultrasound with large right iliac fossa collection s/p percutaneous drainage by IR, 220 mL of dark red fluid drained and 10 Fr drain was left in placed  01/16 - extubated  01/19 - RUE Duplex with thrombosis of the brachial & cephalic veins, RLQ ultrasound with interval decrease in right iliac fossa collection  01/21 - CTA chest and CT abdomen & pelvis obtained demonstrating known RLL and LLL lobar artery PEs but resolution of PE in bilateral pulmonary arteries, new BLL wedge-like opacities likely representing pulmonary infarcts, and interval decrease in RLQ fluid collection  01/22 - RLQ IR drain upsized by IR from 10 Fr to 12 Fr  01/23 - Changed heparin infusion to Eliquis, stable for transfer to floor on 1/25.   1/23-2/1: No acute issues, rehab planning. However on 2/1 while undergoing  hemodialysis through L AVF, had massive infiltration, transferred to Surgical Intensive Care Unit for q1h neurovascular checks with concern for upper arm compartment syndrome  2/2-2/3: IR LUE angiogram, tiny PSA from distal branches of deep brachial artery noted and embolized, no venous injury noted at AVF  2/4: ALCIDES nina placed as temporary HD access        SUBJECTIVE/ROS:  [x] A ten-point review of systems was otherwise negative except as noted.  [ ] Due to altered mental status/intubation, subjective information were not able to be obtained from the patient. History was obtained, to the extent possible, from review of the chart and collateral sources of information.      NEURO  Exam: A and O x 3, WHITING, FC  Meds: acetaminophen   Tablet .. 975 milliGRAM(s) Oral every 6 hours PRN Mild Pain (1 - 3)  gabapentin 100 milliGRAM(s) Oral every 12 hours  HYDROmorphone  Injectable 0.5 milliGRAM(s) IV Push every 6 hours PRN Breakthrough Pain  oxyCODONE    IR 10 milliGRAM(s) Oral every 6 hours PRN Severe Pain (7 - 10)  oxyCODONE    IR 5 milliGRAM(s) Oral every 4 hours PRN Moderate Pain (4 - 6)  [x] Adequacy of sedation and pain control has been assessed and adjusted      RESPIRATORY  RR: 19 (02-05-20 @ 23:00) (10 - 23)  SpO2: 96% (02-05-20 @ 23:00) (93% - 100%)  Wt(kg): --  Exam: unlabored, clear to auscultation bilaterally    CARDIOVASCULAR  HR: 71 (02-05-20 @ 23:00) (69 - 85)  BP: 127/60 (02-05-20 @ 23:00) (86/51 - 146/67)  BP(mean): 86 (02-05-20 @ 23:00) (64 - 98)  Exam: Regular rate and rhythm, Normal S1/S2  Cardiac Rhythm: NSR  Perfusion     [x]Adequate   [ ]Inadequate  Mentation   [x]Normal       [ ]Reduced  Extremities  [x]Warm         [ ]Cool  Volume Status [x]Hypervolemic [ ]Euvolemic [ ]Hypovolemic  Meds: aMIOdarone    Tablet 200 milliGRAM(s) Oral daily    GI/NUTRITION  Exam: Soft, NT, ND  Diet: Renal diet  Meds: famotidine    Tablet 20 milliGRAM(s) Oral daily  polyethylene glycol 3350 17 Gram(s) Oral daily  senna 2 Tablet(s) Oral at bedtime      GENITOURINARY  I&O's Detail    02-04 @ 07:01  -  02-05 @ 07:00  --------------------------------------------------------  IN:    Solution: 100 mL    Solution: 50 mL  Total IN: 150 mL    OUT:    Bulb: 15 mL    Other: 1400 mL  Total OUT: 1415 mL    Total NET: -1265 mL      02-05 @ 07:01  -  02-06 @ 00:57  --------------------------------------------------------  IN:    Oral Fluid: 720 mL    Packed Red Blood Cells: 350 mL    Solution: 100 mL  Total IN: 1170 mL    OUT:    Bulb: 10 mL  Total OUT: 10 mL    Total NET: 1160 mL          02-05    135  |  98  |  21  ----------------------------<  103<H>  4.4   |  24  |  4.82<H>    Ca    7.8<L>      05 Feb 2020 05:57  Phos  4.2     02-05  Mg     2.1     02-05    TPro  3.3<L>  /  Alb  1.2<L>  /  TBili  0.4  /  DBili  0.2  /  AST  12  /  ALT  <5<L>  /  AlkPhos  114  02-05    [ ] Kennedy catheter, indication: N/A  Meds: Nephro-candace 1 Tablet(s) Oral daily  sodium chloride 0.9% lock flush 10 milliLiter(s) IV Push every 1 hour PRN Pre/post blood products, medications, blood draw, and to maintain line patency        HEMATOLOGIC  [ ] VTE Prophylaxis -- On hold.                         8.0    17.41 )-----------( 431      ( 05 Feb 2020 19:32 )             24.5     PT/INR - ( 05 Feb 2020 04:21 )   PT: 14.3 sec;   INR: 1.24 ratio    PTT - ( 05 Feb 2020 04:21 )  PTT:33.8 sec  Transfusion     [x] PRBC (1 unit)   [ ] Platelets   [ ] FFP   [ ] Cryoprecipitate  Meds: epoetin trey Injectable 90098 Unit(s) IV Push <User Schedule>    INFECTIOUS DISEASES  T(C): 37.3 (02-05-20 @ 23:00), Max: 37.7 (02-05-20 @ 19:00)  Wt(kg): --  WBC Count: 17.41 K/uL (02-05 @ 19:32)  WBC Count: 17.45 K/uL (02-05 @ 14:06)  WBC Count: 16.78 K/uL (02-05 @ 12:47)  WBC Count: 18.23 K/uL (02-05 @ 04:21)  Meds: Meropenem  IVPB 500 milliGRAM(s) IV Intermittent every 24 hours  micafungin IVPB 100 milliGRAM(s) IV Intermittent every 24 hours    ENDOCRINE  Capillary Blood Glucose    Meds: allopurinol 100 milliGRAM(s) Oral daily        ACCESS DEVICES:  [x] Peripheral IV  [x] Central Venous Line	[ ] R	[ ] L	[x] IJ	[ ] Fem	[ ] SC	Placed: 2/4  [ ] Arterial Line		[ ] R	[ ] L	[ ] Fem	[ ] Rad	[ ] Ax	Placed:   [ ] PICC:					[ ] Mediport  [ ] Urinary Catheter, Date Placed:   [x] Necessity of urinary, arterial, and venous catheters discussed    OTHER MEDICATIONS:  brimonidine 0.2% Ophthalmic Solution 1 Drop(s) Both EYES three times a day  chlorhexidine 2% Cloths 1 Application(s) Topical <User Schedule>  chlorhexidine 4% Liquid 1 Application(s) Topical <User Schedule>  latanoprost 0.005% Ophthalmic Solution 1 Drop(s) Both EYES daily

## 2020-02-06 NOTE — PROGRESS NOTE ADULT - ASSESSMENT
Assessment	  50 y/o male presenting with influenza A with a hospital course complicated by Infected transplant perinephric hematoma s/p right groin exploration, washout, & biopsy c/b persistent perinephric collection s/p FNA, Right external iliac & common femoral DVTs s/p IVC filter & thrombectomy of the RLE veins, Gross hematuria w/ pseudoaneurysm of the right external iliac & transplant renal artery seen on IR angiogram s/p right groin exploration, washout of the RP space, and transplant nephrectomy w/ repair of the right external iliac artery w/ a bovine patch, Iliac fossa collection s/p IR drainage, Cardiac arrest secondary to a saddle PE, now with L infiltration with likely small amount of ongoing active bleeding with large LUE hematoma while patient was on therapeutic anticoagulation.      - Pain control  - Trend H&H  - cleared from the vascular surgery perspective to resume AC once CBC stable x48 hrs  - f/u repeat blood Cx, once negative, plan for permacath exchange  - continue with compression and elevation  - continue to monitor neurovascular exam. no neurovascular deficits noted at this time   - continue excellent care per sicu / transplant      VASCULAR SURGERY [phone]

## 2020-02-06 NOTE — PROGRESS NOTE ADULT - ASSESSMENT
49M PMHx of ESRD on HD  ( via LUE AVF), HTN, Gout, Glaucoma, NET of pancreas, RA with hand contractures. s/p DDRT 12/8/19,  Post op course c/b DGF requiring HD. Renal bx x 2 c/w profound ATN.     Re-admitted with Influenza and perinephric hematoma. s/p hematoma evacuation/ abd washout and renal biopsy. Post op course c/b leukocytosis, infected hematoma, acute RLE DVT, and bleeding pseudoaneurysm at txp renal artery anastomosis. Now s/p graft nephrectomy. Course further complicated by saddle PE causing right heart strain and hemodynamic instability leading to cardiac arrest requiring ACLS,  Required IR drainage of  post-op collections x 2  now complicated by bleeding following cannulation of  LUE AVF with findings of a tiny pseudoaneurysm arising from a distal branch of the left deep brachial artery s/p dissection of a tiny feeder artery that resulted in resolution of the pseudoaneurysm.    [] DDRT c/b DGF/ATN/perinephric infected hematoma/pseudoneurysm/graft nephrectomy   - S/P AFV cannulation for HD yesterday c/b infiltration causing extravasation/bleeding to the left upper arm and chest noted on CTA  - S/P IR LUE arteriography showing a tiny pseudoaneurysm arising from a distal branch of the left deep brachial artery s/p dissection of a tiny feeder artery that resulted in resolution of the pseudoaneurysm.  - Daily CBC  - ID: denied permcath; s/p RIJ shiley placement. Per ID, needs two neg blood cultures before getting permcath.   - Eliquis/ASA on hold. plan to resume tomorrow 2/7  - Bedside IR drainage of RLQ fluid collection (1/15), drain repositioned 1/22 and cultures NTD  - Plan to D/C BO today and monitor for bleeding  - ID following;  cont Meropenem, Micafungin 100mg daily.   ID plan for discharge on cipro.  - pain control  - bowel regimen  -on pepcid.   - PT/OT/OOB.  VAC change per PT  - Keep B/L LE w/ compression stockings. Has gout pain on R knee, avoid covering the R knee. Apply compression stocking on R side up to above the knee level and apply ACE bandage up to mid/upper thigh.       [] Saddle PE, with right heart strain and complicated by PEA arrest followed by Torsade, ROSC achieved after ACLS  - vascular, CT and vascular cardiology following   -Repeat chest CT 1/28 noted Persistent bilateral lower lobe pulmonary emboli.   - CT 1/21: There are pulmonary emboli within the right lower lobar arteries and the left lower lobar arteries extending into the distal segments as seen on prior study. The pulmonary emboli seen in the right and left pulmonary arteries are no longer visualized. New distal to mid brachial DVT noted  - 1/19 Duplex: RUE Deep vein thrombosis in the mid to distal brachial vein.  - Eliquis/ASA held 2/1 in the setting of AVF bleed   - repeat ECHO with improved R heart strain    [] New Afib/Vtech  Cardiology following, BB low dose in the setting of WCT and hold BB on HD days  Continue amiodarone, card plan to continue for couple of months and will f/u as outpt    [] New acute DVT of R distal brachia vein   - continue to hold anticoagulation due to active LT AVF bleed    [] New acute DVT of R external iliac, common femoral, femoral veins, R soleal vein   - IVC filter placed 1/9, hold anticoagulation due to active afv bleed     [] Thrombocytosis  - PLT now 423; were previously in 800s; cont to trend.    -Heme consulted; likely reactive. Heme work up in progress    [] Dispo  - continue SICU level of care  -discharge to acute rehab once medically stable.

## 2020-02-06 NOTE — PROGRESS NOTE ADULT - ASSESSMENT
Sai Prado is a 50 y/o male presenting with influenza A with a hospital course complicated by:  - Infected transplant perinephric hematoma s/p right groin exploration, washout, & biopsy c/b persistent perinephric collection s/p FNA  - Right external iliac & common femoral DVTs s/p IVC filter & thrombectomy of the RLE veins  - Gross hematuria w/ pseudoaneurysm of the right external iliac & transplant renal artery seen on IR angiogram s/p right groin exploration, washout of the RP space, and transplant nephrectomy w/ repair of the right external iliac artery w/ a bovine patch  - Iliac fossa collection s/p IR drainage  - Cardiac arrest secondary to a saddle PE    PLAN:  Neuro: No active issues.   - Pain control Tylenol, oxycodone, gabapentin, dilaudid   - LUE tenderness w/o neurological sx; continue to monitor    Resp: saddle PE with pulmonary infarct sequela. Largely resolved on most recent imaging  - No active issues.   - No further anticoagulation per Interventional Radiology recommendations, Hematology following    CV: obstructive shock secondary to PE (resolved), s/p PEA arrest with progression to torsades  - Monitor vital signs  - TTE (2/3) w/ grossly normal RV and LV  - Continue amiodarone prior Torsades  - ASA 81 qD  - Holding ASA 81 and metoprolol 12.5 BID.     GI: no acute issues  - Renal diet as tolerated.  - Continue pepcid.   - Bowel regimen with senna & Miralax.  - Continue wound vac to Right groin    Renal: CKD stage V s/p DDRT c/b DGF & perinephric collection s/p right groin exploration, washout, & biopsy c/b persistent perinephric collection s/p FNA; gross hematuria w/ pseudoaneurysm of the right external iliac & transplant renal artery s/p right groin exploration, washout of the RP space, & transplant nephrectomy w/ repair of the right external iliac artery w/ a bovine patch; persistent right iliac fossa collection s/p IR drainage  - Do not use LUE AVF  - ALCIDES nina for HD to remain in place while long term access pending  - On nephro-candace.     Heme: anemia of chronic diseases, saddle PE, right external iliac & common femoral DVTs s/p IVC filter  - S/p LUE arteriography with no bleeding vessel identified. Will continue to monitor CBCs.   - Holding Eliquis for now   - Continue Epogen.     ID: Enterobacter cloacae UTI, transplant perinephric collection and right iliac fossa collection with carbapenem resistant Enterobacter cloacae (CRE) and E. coli.  - On 6 week course of Meropenem, micafungin per Transplant, with eventual plans to switch to Per Os Ciprofloxacin on discharge.     Endo: No acute issues  - Monitor glucose on BMP    Misc: Gout, glaucoma  - Home allopurinol for gout  - Home eyedrops (latanoprost, dorzolamide, & brimonidine) for glaucoma    Disposition:  - Surgical Intensive Care Unit Sai Prado is a 50 y/o male presenting with influenza A with a hospital course complicated by:  - Infected transplant perinephric hematoma s/p right groin exploration, washout, & biopsy c/b persistent perinephric collection s/p FNA  - Right external iliac & common femoral DVTs s/p IVC filter & thrombectomy of the RLE veins  - Gross hematuria w/ pseudoaneurysm of the right external iliac & transplant renal artery seen on IR angiogram s/p right groin exploration, washout of the RP space, and transplant nephrectomy w/ repair of the right external iliac artery w/ a bovine patch  - Iliac fossa collection s/p IR drainage  - Cardiac arrest secondary to a saddle PE    PLAN:  Neuro: No active issues.   - Pain control Tylenol, oxycodone, gabapentin, dilaudid   - LUE tenderness w/o neurological sx. Interval improvement; continue to monitor    Resp: saddle PE with pulmonary infarct sequela. Largely resolved on most recent imaging  - No active issues.   - No further anticoagulation per Interventional Radiology recommendations, Hematology following    CV: obstructive shock secondary to PE (resolved), s/p PEA arrest with progression to torsades de pointes  - Monitor vital signs  - TTE (2/3) w/ grossly normal RV and LV  - Continue Amiodarone for prior Torsades  - Holding ASA 81 and metoprolol 12.5 BID.     GI: no acute issues  - Renal diet as tolerated.  - Continue Pepcid.   - Bowel regimen with senna & Miralax.  - Continue wound vac to Right groin    Renal: CKD stage V s/p DDRT c/b DGF & perinephric collection s/p right groin exploration, washout, & biopsy c/b persistent perinephric collection s/p FNA; gross hematuria w/ pseudoaneurysm of the right external iliac & transplant renal artery s/p right groin exploration, washout of the RP space, & transplant nephrectomy w/ repair of the right external iliac artery w/ a bovine patch; persistent right iliac fossa collection s/p IR drainage  - Do not use LUE KARLAF  - ALCIDES nina for HD to remain in place while long term access pending  - On nephro-candace.     Heme: anemia of chronic diseases, saddle PE, right external iliac & common femoral DVTs s/p IVC filter  - S/p LUE arteriography with no bleeding vessel identified. Will continue to monitor CBCs.   - Holding Eliquis for now   - Continue Epogen.     ID: Enterobacter cloacae UTI, transplant perinephric collection and right iliac fossa collection with carbapenem resistant Enterobacter cloacae (CRE) and E. coli.  - On 6 week course of Meropenem, micafungin per Transplant, with eventual plans to switch to PO Ciprofloxacin on discharge. Antibiotic end date: 02/20/20     Endo: No acute issues  - Monitor glucose on BMP    Misc: Gout, glaucoma  - Home allopurinol for gout  - Home eyedrops (latanoprost, dorzolamide, & brimonidine) for glaucoma    Disposition:  - Surgical Intensive Care Unit

## 2020-02-06 NOTE — PROGRESS NOTE ADULT - SUBJECTIVE AND OBJECTIVE BOX
Surgery Progress Note  Patient is a 49y old  Male who presents with a chief complaint of Fever (06 Feb 2020 00:55)      SUBJECTIVE: Patient seen and examined at bedside with surgical team, patient without complaints.   Arm pain is improving. Palpable thrill in fistula site. Full range of motion left in fingers and wrist.     24 HOUR EVENTS:  -- S/p 1 unit pRBC after Hb drop to <7.0 g/dL. Patient was asymptomatic.  -- No other acute events overnight.  -- ac continues to be held     Vital Signs Last 24 Hrs  T(C): 37.6 (06 Feb 2020 10:27), Max: 37.7 (05 Feb 2020 19:00)  T(F): 99.7 (06 Feb 2020 10:27), Max: 99.9 (05 Feb 2020 19:00)  HR: 77 (06 Feb 2020 10:27) (69 - 83)  BP: 140/65 (06 Feb 2020 10:27) (92/51 - 146/67)  BP(mean): 93 (06 Feb 2020 10:27) (68 - 97)  RR: 23 (06 Feb 2020 10:27) (8 - 23)  SpO2: 100% (06 Feb 2020 10:27) (96% - 100%)    Physical Exam  Constitutional: NAD  Card: RRR, normotensive   Respiratory: breathing comfortably on RA  Ext: Left arm wrapped in ACE; palpable radial pulse, full ROM, neurovascular grossly intact, compartments soft; palpable pedal pulses b/l 2+ edema, full ROM    I&O's Detail    05 Feb 2020 07:01  -  06 Feb 2020 07:00  --------------------------------------------------------  IN:    Oral Fluid: 1070 mL    Packed Red Blood Cells: 350 mL    Solution: 100 mL  Total IN: 1520 mL    OUT:    Bulb: 30 mL  Total OUT: 30 mL    Total NET: 1490 mL      06 Feb 2020 07:01  -  06 Feb 2020 10:51  --------------------------------------------------------  IN:  Total IN: 0 mL    OUT:  Total OUT: 0 mL    Total NET: 0 mL      MEDICATIONS  (STANDING):  allopurinol 100 milliGRAM(s) Oral daily  aMIOdarone    Tablet 200 milliGRAM(s) Oral daily  brimonidine 0.2% Ophthalmic Solution 1 Drop(s) Both EYES three times a day  chlorhexidine 2% Cloths 1 Application(s) Topical <User Schedule>  epoetin trey Injectable 76753 Unit(s) IV Push <User Schedule>  famotidine    Tablet 20 milliGRAM(s) Oral daily  gabapentin 100 milliGRAM(s) Oral every 12 hours  latanoprost 0.005% Ophthalmic Solution 1 Drop(s) Both EYES daily  meropenem  IVPB 500 milliGRAM(s) IV Intermittent every 24 hours  micafungin IVPB 100 milliGRAM(s) IV Intermittent every 24 hours  Nephro-candace 1 Tablet(s) Oral daily  polyethylene glycol 3350 17 Gram(s) Oral daily  senna 2 Tablet(s) Oral at bedtime    MEDICATIONS  (PRN):  acetaminophen   Tablet .. 975 milliGRAM(s) Oral every 6 hours PRN Mild Pain (1 - 3)  HYDROmorphone  Injectable 0.5 milliGRAM(s) IV Push every 6 hours PRN Breakthrough Pain  oxyCODONE    IR 10 milliGRAM(s) Oral every 6 hours PRN Severe Pain (7 - 10)  oxyCODONE    IR 5 milliGRAM(s) Oral every 4 hours PRN Moderate Pain (4 - 6)      LABS:                        7.8    17.82 )-----------( 453      ( 06 Feb 2020 00:39 )             23.5     02-06    134<L>  |  99  |  28<H>  ----------------------------<  119<H>  4.8   |  24  |  5.70<H>    Ca    8.1<L>      06 Feb 2020 00:39  Phos  4.8     02-06  Mg     2.1     02-06    TPro  5.3<L>  /  Alb  2.0<L>  /  TBili  0.7  /  DBili  0.3<H>  /  AST  19  /  ALT  6<L>  /  AlkPhos  193<H>  02-06    PT/INR - ( 06 Feb 2020 00:39 )   PT: 13.7 sec;   INR: 1.20 ratio         PTT - ( 06 Feb 2020 00:39 )  PTT:33.2 sec  LIVER FUNCTIONS - ( 06 Feb 2020 00:39 )  Alb: 2.0 g/dL / Pro: 5.3 g/dL / ALK PHOS: 193 U/L / ALT: 6 U/L / AST: 19 U/L / GGT: x

## 2020-02-06 NOTE — PROGRESS NOTE ADULT - SUBJECTIVE AND OBJECTIVE BOX
[facility] DIVISION OF KIDNEY DISEASES AND HYPERTENSION -- FOLLOW UP NOTE  --------------------------------------------------------------------------------  Chief Complaint: LUE swelling     24 hour events/subjective:  Patient was seen and examined at bedside  Denies any complaints . LUE edema improved     PAST HISTORY  --------------------------------------------------------------------------------  No significant changes to PMH, PSH, FHx, SHx, unless otherwise noted    ALLERGIES & MEDICATIONS  --------------------------------------------------------------------------------  Allergies    coconut (Anaphylaxis)  morphine (Pruritus; Rash)    Intolerances      Standing Inpatient Medications  allopurinol 100 milliGRAM(s) Oral daily  aMIOdarone    Tablet 200 milliGRAM(s) Oral daily  brimonidine 0.2% Ophthalmic Solution 1 Drop(s) Both EYES three times a day  chlorhexidine 2% Cloths 1 Application(s) Topical <User Schedule>  epoetin trey Injectable 62609 Unit(s) IV Push <User Schedule>  famotidine    Tablet 20 milliGRAM(s) Oral daily  gabapentin 100 milliGRAM(s) Oral every 12 hours  latanoprost 0.005% Ophthalmic Solution 1 Drop(s) Both EYES daily  meropenem  IVPB 500 milliGRAM(s) IV Intermittent every 24 hours  micafungin IVPB 100 milliGRAM(s) IV Intermittent every 24 hours  Nephro-candace 1 Tablet(s) Oral daily  polyethylene glycol 3350 17 Gram(s) Oral daily  senna 2 Tablet(s) Oral at bedtime    PRN Inpatient Medications  acetaminophen   Tablet .. 975 milliGRAM(s) Oral every 6 hours PRN  HYDROmorphone  Injectable 0.5 milliGRAM(s) IV Push every 6 hours PRN  oxyCODONE    IR 10 milliGRAM(s) Oral every 6 hours PRN  oxyCODONE    IR 5 milliGRAM(s) Oral every 4 hours PRN      REVIEW OF SYSTEMS  --------------------------------------------------------------------------------  Gen: No fatigue, fevers/chills, weakness  Skin: No rashes  Head/Eyes/Ears/Mouth: No headache;No sore throat  Respiratory: No dyspnea, cough,   CV: No chest pain, PND, orthopnea  GI: No abdominal pain, diarrhea, constipation, nausea, vomiting  Transplant: No pain  : No increased frequency, dysuria, hematuria, nocturia  MSK: No joint pain/swelling; no back pain; no edema  Neuro: No dizziness/lightheadedness, weakness, seizures, numbness, tingling  Psych: No significant nervousness, anxiety, stress, depression    All other systems were reviewed and are negative, except as noted.    VITALS/PHYSICAL EXAM  --------------------------------------------------------------------------------  T(C): 37.6 (02-06-20 @ 10:27), Max: 37.7 (02-05-20 @ 19:00)  HR: 77 (02-06-20 @ 10:27) (69 - 83)  BP: 140/65 (02-06-20 @ 10:27) (92/51 - 146/67)  RR: 23 (02-06-20 @ 10:27) (8 - 23)  SpO2: 100% (02-06-20 @ 10:27) (96% - 100%)  Wt(kg): --        02-05-20 @ 07:01  -  02-06-20 @ 07:00  --------------------------------------------------------  IN: 1520 mL / OUT: 30 mL / NET: 1490 mL    02-06-20 @ 07:01  -  02-06-20 @ 11:54  --------------------------------------------------------  IN: 0 mL / OUT: 0 mL / NET: 0 mL      Physical Exam:  	Gen: NAD  	HEENT: PERRL, supple neck, clear oropharynx  	Pulm: CTA B/L  	CV: RRR, S1S2; no rub  	Back: No spinal or CVA tenderness; no sacral edema  	Abd: +BS, soft, nontender/nondistended  	: No suprapubic tenderness  	UE: Warm, 2+ LUE edema; no asterixis  	LE: Warm, 2+ b/l LE edema  	Neuro: No focal deficits  	Psych: Normal affect and mood  	Skin: Warm, without rashes      LABS/STUDIES  --------------------------------------------------------------------------------              7.8    17.82 >-----------<  453      [02-06-20 @ 00:39]              23.5     134  |  99  |  28  ----------------------------<  119      [02-06-20 @ 00:39]  4.8   |  24  |  5.70        Ca     8.1     [02-06-20 @ 00:39]      Mg     2.1     [02-06-20 @ 00:39]      Phos  4.8     [02-06-20 @ 00:39]    TPro  5.3  /  Alb  2.0  /  TBili  0.7  /  DBili  0.3  /  AST  19  /  ALT  6   /  AlkPhos  193  [02-06-20 @ 00:39]    PT/INR: PT 13.7 , INR 1.20       [02-06-20 @ 00:39]  PTT: 33.2       [02-06-20 @ 00:39]      Creatinine Trend:  SCr 5.70 [02-06 @ 00:39]  SCr 4.82 [02-05 @ 05:57]  SCr 2.90 [02-05 @ 04:21]  SCr 7.62 [02-04 @ 14:58]  SCr 7.19 [02-04 @ 02:26]          BK Virus DNA by PCR:   [facility] Assay Range: 39 copies/mL to 1.00E+10 copies/mL  The limit of quantitation (LOQ) is 39 copies/mL. BK virus DNA  detected below the LOQ will be reported as Detected:<39 copies/mL.  This test was developed and its performance characteristics  determined by [facility]. It has not been cleared or approved  by the U.S. Food and Drug Administration. Results should be used in  conjunction with clinical findings, and should not form the sole  basis for a diagnosis or treatment decision.  ____________________________________________________________  Performed at:  [facility]  [address]  [place] [address]  Laura Pettit PhD HCLD(ABB)  CLIA# [id number] (01-14 @ 11:42)          Iron 23, TIBC 189, %sat 12      [01-27-20 @ 14:03]  HbA1c 5.2      [01-13-20 @ 22:59]    HBsAb 351.0      [01-11-20 @ 16:09]  HBsAg Nonreact      [01-11-20 @ 16:09]  HBcAb Nonreact      [01-11-20 @ 16:09]  HCV 0.09, Nonreact      [01-11-20 @ 16:09]

## 2020-02-06 NOTE — PROGRESS NOTE ADULT - SUBJECTIVE AND OBJECTIVE BOX
Transplant Surgery - Multidisciplinary Rounds  --------------------------------------------------------------  R DDRT    Date:  12/8/19     Present:  Patient seen with multidisciplinary team icluding Transplant Surgeons: Dr. Velásquez, Dr. Cruz, Dr. Espinal. Transplant Nephrologist: Dr. Jose DE LA TORRE, Pharmacist: Job Diaz. PYG 3 x2, NP student, NP: Denis Aviles in am rounds and examined with Dr. Cruz. Disciplines not in attendance will be notified of the plan.     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 Young, followed by Dr. Raphael, [facility] Oncologist), RA with hand contractures. He underwent DDRT on 12/8/19. Post op course c/b DGF requiring HD, thrombocytopenia, elevated LDH and haptoglobin. Schistocytes  on peripheral smear concerning for TMA. Envarsus held. Received Belatacept 12/13. Started on PLEX (12/12, 12/15). Underwent renal bx on 12/13 c/w profound ATN.  Discharged home on 12/20/19 w/ outpatient HD.     Re-admitted 12/27 with influenza, completed treatement with Tamiflu on 12/31. Urine cx on admission grew Ent cloacae, was started on meropenem 12/31. Renal US on admission with increasing hematoma.   ·	OR 1/1 for ex-lap, hematoma evacuation, washout and renal bx. (c/w ATN)  OR cxs grew CRE and e coli. Post op course c/b:   ·	fevers and leukocytosis, CT a/p (1/4) with increased perinephric collection. Underwent IR guided collection 1/6   ·	New onset hematuria, martinez inserted 1/7, started CBI  ·	RLE edema, RLE doppler (1/8) with acute above the knee thrombus of R external iliac, common femoral and femoral veins, Acute calf vein thrombus affecting R soleal vein, Heparin gtt initiated   ·	AMS 1/9  (head CT neg), hyponatremic  ·	Significant hematuria and bleeding around the martinez. Angio 1/9 showed actively bleeding pseudoaneurysm at the anastomosis of the transplant renal artery with the recipient iliac artery. A stent was placed in the iliac artery to  control hemorrhage. IVC filter placed.   ·	Emergent OR 1/9-1/10 for graft nephrectomy;  ureter to bladder anastomosis was completely disrupted, repaired the R external iliac artery with bovine pericardial patch, repaired right external iliac vein, performed thrombectomy of RLE veins, Intra op received 3u PRBC.   ·	Extubated 1/11  ·	1/13 Saddle PE, with right heart strain and complicated by PEA arrest followed by Torsade, ROSC achieved after ACLS; re-intubated  ·	1/15 Renal US with 13.7 x 5.0 x 1.5 cm in RLQ transplant bed; s/p bedside IR guided drainage, pigtail in place. Fluid cx to date with NG  ·	1/21 CT C/A/P showing persistent collection, catheter at superior portion of collection  ·	1/22 IR pigtail drain repositioned and upsized to 12Fr catheter w/ increased output  ·	1/23 Transitioned to Eliquis from Heparin drip  ·	2/1  AVF infiltrated/ w/ - arterial extrav/ bleeding  CW hematoma     Interval events:    - S/P AFV cannulation for HD 2/1 with infiltration causing extravasation/bleeding to the left upper arm and chest noted on CTA  - 2/3: Left upper cavity arteriography demonstrated a tiny pseudoaneurysm arising from a distal branch of the left deep brachial artery. The tiny feeder artery was dissected resulting in resolution of the pseudoaneurysm.  - H/H remains stable.   - Eliquis still Held  - LUE edema with tenderness under axilla along rib cage and across left chest is improving.   - anuric[facility]   - Still with 2+ LE edema  - TMax 36.9 WBC down to 17.8 from 21.20. On meropenem and micafungin.       cxs:   12/29: Urine Cx: Enterobacter Cloacae  1/1 OR Perinephric collection Cx:  Carbapenem resistant Ent Cloacae, E. Coli  1/1: R kidney abscess swab: Enterobacter Cloacae  1/1  OR tissue: Enterobacter Cloacae  1/5: Urine Cx: Enteropbacter Cloacae  1/5: Skin incision Cx (bedside): NG  1/6: Adominal fluid Cx from IR:  NG   1/7: Urine Cx:  NG  1/7: BCX2:  NG  1/10 retroperit hematoma - enerobacter   1/10 bladder hematoma - enterobacter  1/15 Drainage fluid - NGTD  1/16: BC X2: NGTD  1/18: SPutum: Normal harvey  1/21: BC X2: NTGD  1/22: IR body fluid Cx: GNR pre-milian    Potential Discharge date: pending clinical improvement       MEDICATIONS  (STANDING):  allopurinol 100 milliGRAM(s) Oral daily  aMIOdarone    Tablet 200 milliGRAM(s) Oral daily  brimonidine 0.2% Ophthalmic Solution 1 Drop(s) Both EYES three times a day  chlorhexidine 2% Cloths 1 Application(s) Topical <User Schedule>  epoetin trey Injectable 86921 Unit(s) IV Push <User Schedule>  famotidine    Tablet 20 milliGRAM(s) Oral daily  gabapentin 100 milliGRAM(s) Oral every 12 hours  latanoprost 0.005% Ophthalmic Solution 1 Drop(s) Both EYES daily  meropenem  IVPB 500 milliGRAM(s) IV Intermittent every 24 hours  micafungin IVPB 100 milliGRAM(s) IV Intermittent every 24 hours  Nephro-candace 1 Tablet(s) Oral daily  polyethylene glycol 3350 17 Gram(s) Oral daily  senna 2 Tablet(s) Oral at bedtime    MEDICATIONS  (PRN):  acetaminophen   Tablet .. 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  Carpal tunnel syndrome  Brain cyst: had MRI recently- now gone  Anemia  Gastric ulcer: Long time ago  Rheumatoid arthritis  Renal transplant recipient  Breakdown (mechanical) of penile (implanted) prosthesis, sequela  End-stage renal disease (ESRD): PERMACATH PLACEMENT  Abscess of left buttock: s/p I &amp; D  AV fistula: placement left lower arm  S/P cystoscopy: stent placement &amp; removal for kidney stones, lithiotripsy  s/p Lt Carpal tunnel: 2011      Vital Signs Last 24 Hrs  T(C): 37.6 (06 Feb 2020 10:27), Max: 37.7 (05 Feb 2020 19:00)  T(F): 99.7 (06 Feb 2020 10:27), Max: 99.9 (05 Feb 2020 19:00)  HR: 77 (06 Feb 2020 10:27) (69 - 83)  BP: 140/65 (06 Feb 2020 10:27) (92/51 - 146/67)  BP(mean): 93 (06 Feb 2020 10:27) (68 - 97)  RR: 23 (06 Feb 2020 10:27) (8 - 23)  SpO2: 100% (06 Feb 2020 10:27) (96% - 100%)    I&O's Summary    05 Feb 2020 07:01  -  06 Feb 2020 07:00  --------------------------------------------------------  IN: 1520 mL / OUT: 30 mL / NET: 1490 mL    06 Feb 2020 07:01  -  06 Feb 2020 11:10  --------------------------------------------------------  IN: 0 mL / OUT: 0 mL / NET: 0 mL                              7.8    17.82 )-----------( 453      ( 06 Feb 2020 00:39 )             23.5     02-06    134<L>  |  99  |  28<H>  ----------------------------<  119<H>  4.8   |  24  |  5.70<H>    Ca    8.1<L>      06 Feb 2020 00:39  Phos  4.8     02-06  Mg     2.1     02-06    TPro  5.3<L>  /  Alb  2.0<L>  /  TBili  0.7  /  DBili  0.3<H>  /  AST  19  /  ALT  6<L>  /  AlkPhos  193<H>  02-06      REVIEW OF SYSTEMS  Gen: + weakness   Skin: No rashes  Head/Eyes/Ears/Mouth: No headache; Normal hearing; Normal vision w/o blurriness; No sinus pain/discomfort, sore throat  Respiratory: no SOB  CV: No chest pain, PND, orthopnea  GI: incisional tenderness   : anuric  MSK: c/o LUE pain and tenderness but reports improvement.   Neuro: No dizziness/lightheadedness, weakness, seizures, numbness, tingling  Heme: No easy bruising or bleeding  Endo: No heat/cold intolerance  Psych: No significant nervousness, anxiety, stress, depression      PHYSICAL EXAM:    Constitutional: NAD  	Eyes: Anicteric, PERRLA  	Respiratory: CTA b/l  	Cardiovascular: RRR  	Gastrointestinal: Soft abdomen, appropriate incisional TTP, ND. Wound vac ss/murky output, RLQ drain with SS drainage    Genitourinary:  anuric, large amount scrotal edema     Extremities: LUE and left chest with +3 edema; less firm and tender to touch as compared to yesterday; superficial skin tear on LUEb/l 2+ lower ext edema with TEDS stockings, + flank edema, RUE edema also improving.   	Vascular: DP signal bilateral, L AVF palpable  	Neurological: A&O x3.  	Skin: no new lesions/ulcerations  	Musculoskeletal: Moving all extremities

## 2020-02-06 NOTE — PROGRESS NOTE ADULT - SUBJECTIVE AND OBJECTIVE BOX
Interventional Radiology Follow- Up Note      This is a 49y Male /p temp HD catheter on 2/4 in Interventional Radiology.    Patient seen and examined @ bedside. Dialysis in progress when seen at bedside. No complaints offered.     Vitals: T(F): 99.7 (02-06-20 @ 10:27), Max: 99.9 (02-05-20 @ 19:00)  HR: 73 (02-06-20 @ 12:00) (69 - 83)  BP: 150/76 (02-06-20 @ 12:00) (99/53 - 150/76)  RR: 14 (02-06-20 @ 12:00) (7 - 23)  SpO2: 98% (02-06-20 @ 12:00) (96% - 100%)    LABS:                        8.1    x     )-----------( 419      ( 06 Feb 2020 12:14 )             24.4     02-06    134<L>  |  99  |  28<H>  ----------------------------<  119<H>  4.8   |  24  |  5.70<H>    Ca    8.1<L>      06 Feb 2020 00:39  Phos  4.8     02-06  Mg     2.1     02-06    TPro  5.3<L>  /  Alb  2.0<L>  /  TBili  0.7  /  DBili  0.3<H>  /  AST  19  /  ALT  6<L>  /  AlkPhos  193<H>  02-06    PT/INR - ( 06 Feb 2020 00:39 )   PT: 13.7 sec;   INR: 1.20 ratio         PTT - ( 06 Feb 2020 00:39 )  PTT:33.2 sec    PHYSICAL EXAM:  General: Nontoxic, in NAD, A&O x3  Abdomen: soft, NTND      Assessment/Plan:    Impression: 49 yr old man with ESRD s/p DDRT in Dec 2019 with complicated post operative course, now s/p graft nephrectomy on 1/10/20 back on hemodialysis off all immunosuppression, massive PE/DVT on Eliquis now. Patient s/p temp HD catheter placement in IR. Tolerating HD and catheter functioning well.     -advance diet as tolerated  -monitor h/h; transfuse as needed  -can use HD access immediately   -will discuss with IR attending     Please call IR at extension [phone] with any questions, concerns, or issues regarding above.        Olivia Fermin, [facility]  Spectra # [id number]

## 2020-02-06 NOTE — PROGRESS NOTE ADULT - ASSESSMENT
49 year old male with ESRD on HD, now s/p ddrt on 12/8/2019 course complicated by TMA/profound ATN, who presents from a HD center with a fever and cough. He was initially admitted with influenza. He is s/p washout of infected collection and biopsy which revealed ATN.  He was found to have diffusely enlarging perinephric fluid collection.    While on telemetry, he had an episode of a wide complex tachycardia. It initially appears irregular, suggesting an atrial arrhythmia with aberrancy, though the remainder appears more like an episode of non-sustained VT.   s/p washout on 1/1/2020 with worsening sepsis and transfer to ICU, s/p IR drainage of perinephric collection on 1/6. 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 He underwent operative repair of right external iliac artery with pericardial patch, transplant nephrectomy, RLE thrombectomy, and IVC filter on 1/9.     s/p corrina ->tachy arrest with tdp, af rvr, now on amio, with large PE    He has now returned to the SICU with significant lue swelling and active arterial extravasation, with drop in blood counts. He is s/p angiogram with tiny pseudoaneurysm.    - remains in sr   - had developed malignant arrhythmias which seemed to start with bradycardia, leading to more tachycardia and bursts of VT and rapid AF.  The apparent af degenerated into torsades.  He was shocked and has been maintaining SR since. Arrhythmias were most likely triggered by the massive vte event, are unlikely to represent a primary cardiac issue  - cont amio at 200 daily.   - hold metoprolol  - repeat ekg daily to evaluate qtc, while on antifungals/amiodarone  - hold asa     - was on apixaban for his PE  - this has been held in the setting of significant arterial extravasation at his fistula.  - trend CBC. Transfuse to keep hb >8 (today 7.8)  - cont to hold a/c  - echo with preserved/hyperdynamic lv, with RV failure consistent with acute RV overload.  Repeat echo noted, and seemingly with improved RV function  - remains with marked leg edema/vol ol. cont hd    - Further cardiac workup will depend on clinical course.  - will follow with you

## 2020-02-07 PROCEDURE — 99232 SBSQ HOSP IP/OBS MODERATE 35: CPT

## 2020-02-07 PROCEDURE — 99232 SBSQ HOSP IP/OBS MODERATE 35: CPT | Mod: GC

## 2020-02-07 RX ORDER — METOPROLOL TARTRATE 50 MG
25 TABLET ORAL EVERY 12 HOURS
Refills: 0 | Status: DISCONTINUED | OUTPATIENT
Start: 2020-02-07 | End: 2020-02-14

## 2020-02-07 RX ORDER — APIXABAN 2.5 MG/1
2.5 TABLET, FILM COATED ORAL
Refills: 0 | Status: DISCONTINUED | OUTPATIENT
Start: 2020-02-07 | End: 2020-02-07

## 2020-02-07 RX ORDER — APIXABAN 2.5 MG/1
2.5 TABLET, FILM COATED ORAL EVERY 12 HOURS
Refills: 0 | Status: DISCONTINUED | OUTPATIENT
Start: 2020-02-07 | End: 2020-02-09

## 2020-02-07 RX ADMIN — MICAFUNGIN SODIUM 105 MILLIGRAM(S): 100 INJECTION, POWDER, LYOPHILIZED, FOR SOLUTION INTRAVENOUS at 16:31

## 2020-02-07 RX ADMIN — HYDROMORPHONE HYDROCHLORIDE 0.5 MILLIGRAM(S): 2 INJECTION INTRAMUSCULAR; INTRAVENOUS; SUBCUTANEOUS at 11:21

## 2020-02-07 RX ADMIN — APIXABAN 2.5 MILLIGRAM(S): 2.5 TABLET, FILM COATED ORAL at 10:15

## 2020-02-07 RX ADMIN — CHLORHEXIDINE GLUCONATE 1 APPLICATION(S): 213 SOLUTION TOPICAL at 06:05

## 2020-02-07 RX ADMIN — OXYCODONE HYDROCHLORIDE 10 MILLIGRAM(S): 5 TABLET ORAL at 22:30

## 2020-02-07 RX ADMIN — Medication 100 MILLIGRAM(S): at 12:47

## 2020-02-07 RX ADMIN — BRIMONIDINE TARTRATE 1 DROP(S): 2 SOLUTION/ DROPS OPHTHALMIC at 13:34

## 2020-02-07 RX ADMIN — OXYCODONE HYDROCHLORIDE 10 MILLIGRAM(S): 5 TABLET ORAL at 07:55

## 2020-02-07 RX ADMIN — Medication 1 TABLET(S): at 12:46

## 2020-02-07 RX ADMIN — OXYCODONE HYDROCHLORIDE 10 MILLIGRAM(S): 5 TABLET ORAL at 16:30

## 2020-02-07 RX ADMIN — HYDROMORPHONE HYDROCHLORIDE 0.5 MILLIGRAM(S): 2 INJECTION INTRAMUSCULAR; INTRAVENOUS; SUBCUTANEOUS at 11:06

## 2020-02-07 RX ADMIN — BRIMONIDINE TARTRATE 1 DROP(S): 2 SOLUTION/ DROPS OPHTHALMIC at 06:04

## 2020-02-07 RX ADMIN — GABAPENTIN 100 MILLIGRAM(S): 400 CAPSULE ORAL at 17:43

## 2020-02-07 RX ADMIN — GABAPENTIN 100 MILLIGRAM(S): 400 CAPSULE ORAL at 06:38

## 2020-02-07 RX ADMIN — Medication 25 MILLIGRAM(S): at 17:43

## 2020-02-07 RX ADMIN — FAMOTIDINE 20 MILLIGRAM(S): 10 INJECTION INTRAVENOUS at 12:46

## 2020-02-07 RX ADMIN — OXYCODONE HYDROCHLORIDE 10 MILLIGRAM(S): 5 TABLET ORAL at 22:03

## 2020-02-07 RX ADMIN — LATANOPROST 1 DROP(S): 0.05 SOLUTION/ DROPS OPHTHALMIC; TOPICAL at 12:47

## 2020-02-07 RX ADMIN — OXYCODONE HYDROCHLORIDE 5 MILLIGRAM(S): 5 TABLET ORAL at 12:53

## 2020-02-07 RX ADMIN — APIXABAN 2.5 MILLIGRAM(S): 2.5 TABLET, FILM COATED ORAL at 17:43

## 2020-02-07 RX ADMIN — AMIODARONE HYDROCHLORIDE 200 MILLIGRAM(S): 400 TABLET ORAL at 06:04

## 2020-02-07 RX ADMIN — OXYCODONE HYDROCHLORIDE 5 MILLIGRAM(S): 5 TABLET ORAL at 13:50

## 2020-02-07 RX ADMIN — BRIMONIDINE TARTRATE 1 DROP(S): 2 SOLUTION/ DROPS OPHTHALMIC at 22:03

## 2020-02-07 RX ADMIN — OXYCODONE HYDROCHLORIDE 10 MILLIGRAM(S): 5 TABLET ORAL at 17:30

## 2020-02-07 RX ADMIN — OXYCODONE HYDROCHLORIDE 10 MILLIGRAM(S): 5 TABLET ORAL at 07:25

## 2020-02-07 RX ADMIN — MEROPENEM 100 MILLIGRAM(S): 1 INJECTION INTRAVENOUS at 13:34

## 2020-02-07 NOTE — PROGRESS NOTE ADULT - SUBJECTIVE AND OBJECTIVE BOX
VASCULAR SURGERY DAILY PROGRESS NOTE:    Subjective:  Patient seen and examined on AM rounds. HD overnight through [facility], 2000cc removed; No acute event overnight, no active complaints. Arm pain improving with full ROM in left UE.    Vital Signs Last 24 Hrs  T(C): 37 (2020 07:00), Max: 37.8 (2020 13:58)  T(F): 98.6 (2020 07:00), Max: 100 (2020 13:58)  HR: 69 (2020 07:00) (69 - 85)  BP: 127/60 (2020 07:00) (107/55 - 157/64)  BP(mean): 87 (2020 07:00) (76 - 106)  RR: 16 (2020 07:00) (7 - 32)  SpO2: 98% (2020 07:00) (94% - 100%)    Physical Exam  Gen: sitting in chair, NAD  Card: RRR, normotensive  Chest/Back: hematoma stable  Respiratory: breathing comfortably on RA  Ext: Left arm wrapped in ACE; palpable L radial pulse, +thrill, full ROM, neurovascular grossly intact, compartments soft; +pedal pulses      I&O's Detail    2020 07:01  -  2020 07:00  --------------------------------------------------------  IN:    Oral Fluid: 200 mL    Solution: 50 mL    Solution: 100 mL  Total IN: 350 mL    OUT:    Other: 2000 mL  Total OUT: 2000 mL    Total NET: -1650 mL          Daily     Daily Weight in k.8 (2020 13:58)    MEDICATIONS  (STANDING):  allopurinol 100 milliGRAM(s) Oral daily  aMIOdarone    Tablet 200 milliGRAM(s) Oral daily  brimonidine 0.2% Ophthalmic Solution 1 Drop(s) Both EYES three times a day  chlorhexidine 2% Cloths 1 Application(s) Topical <User Schedule>  epoetin trey Injectable 80159 Unit(s) IV Push <User Schedule>  famotidine    Tablet 20 milliGRAM(s) Oral daily  gabapentin 100 milliGRAM(s) Oral every 12 hours  latanoprost 0.005% Ophthalmic Solution 1 Drop(s) Both EYES daily  meropenem  IVPB 500 milliGRAM(s) IV Intermittent every 24 hours  micafungin IVPB 100 milliGRAM(s) IV Intermittent every 24 hours  Nephro-candace 1 Tablet(s) Oral daily  polyethylene glycol 3350 17 Gram(s) Oral daily  senna 2 Tablet(s) Oral at bedtime    MEDICATIONS  (PRN):  acetaminophen   Tablet .. 975 milliGRAM(s) Oral every 6 hours PRN Mild Pain (1 - 3)  HYDROmorphone  Injectable 0.5 milliGRAM(s) IV Push every 6 hours PRN Breakthrough Pain  oxyCODONE    IR 10 milliGRAM(s) Oral every 6 hours PRN Severe Pain (7 - 10)  oxyCODONE    IR 5 milliGRAM(s) Oral every 4 hours PRN Moderate Pain (4 - 6)      LABS:                        8.0    15.77 )-----------( 430      ( 2020 22:42 )             25.0     02-    137  |  96  |  18  ----------------------------<  104<H>  3.9   |  26  |  4.33<H>    Ca    8.4      2020 22:42  Phos  3.3     02-06  Mg     2.0     -    TPro  5.9<L>  /  Alb  2.1<L>  /  TBili  0.8  /  DBili  0.4<H>  /  AST  18  /  ALT  <5<L>  /  AlkPhos  197<H>  02-    PT/INR - ( 2020 22:42 )   PT: 14.0 sec;   INR: 1.21 ratio         PTT - ( 2020 22:42 )  PTT:32.1 sec

## 2020-02-07 NOTE — PROGRESS NOTE ADULT - PROBLEM SELECTOR PROBLEM 6
Influenza A
Right leg swelling
Thrombocytosis

## 2020-02-07 NOTE — PROGRESS NOTE ADULT - SUBJECTIVE AND OBJECTIVE BOX
[facility] - Melissa Kelsey, Enrique, Maximino, Marin, Ravi Goodson  Office Number:  [phone]    Patient resting comfortably in bed in NAD.  Laying flat with no respiratory distress.  No complaints of chest pain, dyspnea, palpitations, PND, or orthopnea.  sitting in chair and feeling better this morning    ROS: negative unless otherwise mentioned.    Telemetry:  sr    MEDICATIONS  (STANDING):  allopurinol 100 milliGRAM(s) Oral daily  aMIOdarone    Tablet 200 milliGRAM(s) Oral daily  brimonidine 0.2% Ophthalmic Solution 1 Drop(s) Both EYES three times a day  chlorhexidine 2% Cloths 1 Application(s) Topical <User Schedule>  epoetin trey Injectable 33776 Unit(s) IV Push <User Schedule>  famotidine    Tablet 20 milliGRAM(s) Oral daily  gabapentin 100 milliGRAM(s) Oral every 12 hours  latanoprost 0.005% Ophthalmic Solution 1 Drop(s) Both EYES daily  meropenem  IVPB 500 milliGRAM(s) IV Intermittent every 24 hours  micafungin IVPB 100 milliGRAM(s) IV Intermittent every 24 hours  Nephro-candace 1 Tablet(s) Oral daily  polyethylene glycol 3350 17 Gram(s) Oral daily  senna 2 Tablet(s) Oral at bedtime    MEDICATIONS  (PRN):  acetaminophen   Tablet .. 975 milliGRAM(s) Oral every 6 hours PRN Mild Pain (1 - 3)  HYDROmorphone  Injectable 0.5 milliGRAM(s) IV Push every 6 hours PRN Breakthrough Pain  oxyCODONE    IR 10 milliGRAM(s) Oral every 6 hours PRN Severe Pain (7 - 10)  oxyCODONE    IR 5 milliGRAM(s) Oral every 4 hours PRN Moderate Pain (4 - 6)      Allergies    coconut (Anaphylaxis)  morphine (Pruritus; Rash)    Intolerances        Vital Signs Last 24 Hrs  T(C): 37 (07 Feb 2020 07:00), Max: 37.8 (06 Feb 2020 13:58)  T(F): 98.6 (07 Feb 2020 07:00), Max: 100 (06 Feb 2020 13:58)  HR: 69 (07 Feb 2020 07:00) (69 - 85)  BP: 127/60 (07 Feb 2020 07:00) (107/55 - 157/64)  BP(mean): 87 (07 Feb 2020 07:00) (76 - 106)  RR: 16 (07 Feb 2020 07:00) (7 - 32)  SpO2: 98% (07 Feb 2020 07:00) (94% - 100%)    I&O's Summary    06 Feb 2020 07:01  -  07 Feb 2020 07:00  --------------------------------------------------------  IN: 350 mL / OUT: 2000 mL / NET: -1650 mL        ON EXAM:    Constitutional: well-nourished, well-developed, NAD   HEENT:  MMM, sclerae anicteric, conjunctivae clear, no oral cyanosis.  Pulmonary: Non-labored, breath sounds are clear bilaterally, No wheezing, rales or rhonchi  Cardiovascular: Regular, S1 and S2.  No murmur.  No rubs, gallops or clicks  Gastrointestinal: Bowel Sounds present, soft, nontender.   Lymph: marked peripheral edema.   Neurological: Alert, no focal deficits  Skin: No rashes.  Psych:  Mood & affect appropriate    LABS: All Labs Reviewed:                        8.0    15.77 )-----------( 430      ( 06 Feb 2020 22:42 )             25.0                         8.1    15.83 )-----------( 419      ( 06 Feb 2020 12:14 )             24.4                         7.8    17.82 )-----------( 453      ( 06 Feb 2020 00:39 )             23.5     06 Feb 2020 22:42    137    |  96     |  18     ----------------------------<  104    3.9     |  26     |  4.33   06 Feb 2020 00:39    134    |  99     |  28     ----------------------------<  119    4.8     |  24     |  5.70   05 Feb 2020 05:57    135    |  98     |  21     ----------------------------<  103    4.4     |  24     |  4.82     Ca    8.4        06 Feb 2020 22:42  Ca    8.1        06 Feb 2020 00:39  Ca    7.8        05 Feb 2020 05:57  Phos  3.3       06 Feb 2020 22:42  Phos  4.8       06 Feb 2020 00:39  Phos  4.2       05 Feb 2020 05:57  Mg     2.0       06 Feb 2020 22:42  Mg     2.1       06 Feb 2020 00:39  Mg     2.1       05 Feb 2020 05:57    TPro  5.9    /  Alb  2.1    /  TBili  0.8    /  DBili  0.4    /  AST  18     /  ALT  <5     /  AlkPhos  197    06 Feb 2020 22:42  TPro  5.3    /  Alb  2.0    /  TBili  0.7    /  DBili  0.3    /  AST  19     /  ALT  6      /  AlkPhos  193    06 Feb 2020 00:39  TPro  3.3    /  Alb  1.2    /  TBili  0.4    /  DBili  0.2    /  AST  12     /  ALT  <5     /  AlkPhos  114    05 Feb 2020 04:21    PT/INR - ( 06 Feb 2020 22:42 )   PT: 14.0 sec;   INR: 1.21 ratio         PTT - ( 06 Feb 2020 22:42 )  PTT:32.1 sec      Blood Culture:

## 2020-02-07 NOTE — PROGRESS NOTE ADULT - PROBLEM SELECTOR PROBLEM 4
Immunosuppression
Anemia
Infection
Right leg swelling
Right leg swelling
Urinary tract infection with hematuria, site unspecified
Anemia
Right leg swelling

## 2020-02-07 NOTE — PROGRESS NOTE ADULT - SUBJECTIVE AND OBJECTIVE BOX
[facility] DIVISION OF KIDNEY DISEASES AND HYPERTENSION -- FOLLOW UP NOTE  --------------------------------------------------------------------------------  Chief Complaint: LUE edema    24 hour events/subjective:  Patient was seen and examined at bedside  Denies any complaints   Tolerated IHD yesterday with no issues     PAST HISTORY  --------------------------------------------------------------------------------  No significant changes to PMH, PSH, FHx, SHx, unless otherwise noted    ALLERGIES & MEDICATIONS  --------------------------------------------------------------------------------  Allergies    coconut (Anaphylaxis)  morphine (Pruritus; Rash)    Intolerances      Standing Inpatient Medications  allopurinol 100 milliGRAM(s) Oral daily  aMIOdarone    Tablet 200 milliGRAM(s) Oral daily  apixaban 2.5 milliGRAM(s) Oral every 12 hours  brimonidine 0.2% Ophthalmic Solution 1 Drop(s) Both EYES three times a day  chlorhexidine 2% Cloths 1 Application(s) Topical <User Schedule>  epoetin trey Injectable 57690 Unit(s) IV Push <User Schedule>  famotidine    Tablet 20 milliGRAM(s) Oral daily  gabapentin 100 milliGRAM(s) Oral every 12 hours  latanoprost 0.005% Ophthalmic Solution 1 Drop(s) Both EYES daily  meropenem  IVPB 500 milliGRAM(s) IV Intermittent every 24 hours  metoprolol tartrate 25 milliGRAM(s) Oral every 12 hours  micafungin IVPB 100 milliGRAM(s) IV Intermittent every 24 hours  Nephro-candace 1 Tablet(s) Oral daily  polyethylene glycol 3350 17 Gram(s) Oral daily  senna 2 Tablet(s) Oral at bedtime    PRN Inpatient Medications  acetaminophen   Tablet .. 975 milliGRAM(s) Oral every 6 hours PRN  HYDROmorphone  Injectable 0.5 milliGRAM(s) IV Push every 6 hours PRN  oxyCODONE    IR 10 milliGRAM(s) Oral every 6 hours PRN  oxyCODONE    IR 5 milliGRAM(s) Oral every 4 hours PRN      REVIEW OF SYSTEMS  --------------------------------------------------------------------------------  Gen: No fatigue, fevers/chills, weakness  Skin: No rashes  Head/Eyes/Ears/Mouth: No headache;No sore throat  Respiratory: No dyspnea, cough,   CV: No chest pain, PND, orthopnea  GI: No abdominal pain, diarrhea, constipation, nausea, vomiting  Transplant: No pain  : No increased frequency, dysuria, hematuria, nocturia  MSK: No joint pain/swelling; no back pain; no edema  Neuro: No dizziness/lightheadedness, weakness, seizures, numbness, tingling  Psych: No significant nervousness, anxiety, stress, depression    All other systems were reviewed and are negative, except as noted.    VITALS/PHYSICAL EXAM  --------------------------------------------------------------------------------  T(C): 37 (02-07-20 @ 07:00), Max: 37.8 (02-06-20 @ 13:58)  HR: 80 (02-07-20 @ 10:07) (69 - 85)  BP: 146/68 (02-07-20 @ 10:07) (107/55 - 157/64)  RR: 29 (02-07-20 @ 10:07) (7 - 32)  SpO2: 97% (02-07-20 @ 10:07) (94% - 100%)  Wt(kg): --        02-06-20 @ 07:01  -  02-07-20 @ 07:00  --------------------------------------------------------  IN: 350 mL / OUT: 2000 mL / NET: -1650 mL    02-07-20 @ 07:01  -  02-07-20 @ 11:08  --------------------------------------------------------  IN: 150 mL / OUT: 0 mL / NET: 150 mL      Physical Exam:  	Gen: NAD  	HEENT: PERRL, supple neck, clear oropharynx  	Pulm: CTA B/L  	CV: RRR, S1S2; no rub  	Back: No spinal or CVA tenderness; no sacral edema  	Abd: +BS, soft, nontender/nondistended  	: No suprapubic tenderness  	UE: 1-2+ edema over LUE   	LE: 2+ edema over b/l LE    	Neuro: No focal deficits  	Psych: Normal affect and mood  	Skin: Warm, without rashes      LABS/STUDIES  --------------------------------------------------------------------------------              8.0    15.77 >-----------<  430      [02-06-20 @ 22:42]              25.0     137  |  96  |  18  ----------------------------<  104      [02-06-20 @ 22:42]  3.9   |  26  |  4.33        Ca     8.4     [02-06-20 @ 22:42]      Mg     2.0     [02-06-20 @ 22:42]      Phos  3.3     [02-06-20 @ 22:42]    TPro  5.9  /  Alb  2.1  /  TBili  0.8  /  DBili  0.4  /  AST  18  /  ALT  <5  /  AlkPhos  197  [02-06-20 @ 22:42]    PT/INR: PT 14.0 , INR 1.21       [02-06-20 @ 22:42]  PTT: 32.1       [02-06-20 @ 22:42]      Creatinine Trend:  SCr 4.33 [02-06 @ 22:42]  SCr 5.70 [02-06 @ 00:39]  SCr 4.82 [02-05 @ 05:57]  SCr 2.90 [02-05 @ 04:21]  SCr 7.62 [02-04 @ 14:58]        BK Virus DNA by PCR:   [facility] Assay Range: 39 copies/mL to 1.00E+10 copies/mL  The limit of quantitation (LOQ) is 39 copies/mL. BK virus DNA  detected below the LOQ will be reported as Detected:<39 copies/mL.  This test was developed and its performance characteristics  determined by [facility]. It has not been cleared or approved  by the U.[place]. Food and Drug Administration. Results should be used in  conjunction with clinical findings, and should not form the sole  basis for a diagnosis or treatment decision.  ____________________________________________________________  Performed at:  [facility]  [address]  [place] [address]  Laura Pettit PhD HCLD(ABB)  CLIA# [id number] (01-14 @ 11:42)          Iron 23, TIBC 189, %sat 12      [01-27-20 @ 14:03]  HbA1c 5.2      [01-13-20 @ 22:59]    HBsAb 351.0      [01-11-20 @ 16:09]  HBsAg Nonreact      [01-11-20 @ 16:09]  HBcAb Nonreact      [01-11-20 @ 16:09]  HCV 0.09, Nonreact      [01-11-20 @ 16:09]

## 2020-02-07 NOTE — PROGRESS NOTE ADULT - PROBLEM SELECTOR PLAN 2
Eliquis was  held s/p arm/ chest hematoma. Has filter in place for PE and LE clot. Hb Will restart Eliquis at 2.5 mg po bid today once Hb is stable and there is no concern for active bleeding

## 2020-02-07 NOTE — PROGRESS NOTE ADULT - SUBJECTIVE AND OBJECTIVE BOX
Transplant Surgery - Multidisciplinary Rounds  --------------------------------------------------------------  R DDRT    Date:  12/8/19     Present:  Patient seen with multidisciplinary team including Transplant Surgeons: Dr. Velásquez, Dr. Be, Dr. Espinal. Transplant Nephrologist: Dr. Jose DE LA TORRE, Pharmacist: Job Diaz. PYG 3 x2, PA student, NP: Stefan in am rounds and examined with Dr. Kwan. Disciplines not in attendance will be notified of the plan.     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 Young, followed by Dr. Raphael, [facility] Oncologist), RA with hand contractures. He underwent DDRT on 12/8/19. Post op course c/b DGF requiring HD, thrombocytopenia, elevated LDH and haptoglobin. Schistocytes  on peripheral smear concerning for TMA. Envarsus held. Received Belatacept 12/13. Started on PLEX (12/12, 12/15). Underwent renal bx on 12/13 c/w profound ATN.  Discharged home on 12/20/19 w/ outpatient HD.     Re-admitted 12/27 with influenza, completed treatement with Tamiflu on 12/31. Urine cx on admission grew Ent cloacae, was started on meropenem 12/31. Renal US on admission with increasing hematoma.   ·	OR 1/1 for ex-lap, hematoma evacuation, washout and renal bx. (c/w ATN)  OR cxs grew CRE and e coli. Post op course c/b:   ·	fevers and leukocytosis, CT a/p (1/4) with increased perinephric collection. Underwent IR guided collection 1/6   ·	New onset hematuria, martinez inserted 1/7, started CBI  ·	RLE edema, RLE doppler (1/8) with acute above the knee thrombus of R external iliac, common femoral and femoral veins, Acute calf vein thrombus affecting R soleal vein, Heparin gtt initiated   ·	AMS 1/9  (head CT neg), hyponatremic  ·	Significant hematuria and bleeding around the martinez. Angio 1/9 showed actively bleeding pseudoaneurysm at the anastomosis of the transplant renal artery with the recipient iliac artery. A stent was placed in the iliac artery to  control hemorrhage. IVC filter placed.   ·	Emergent OR 1/9-1/10 for graft nephrectomy;  ureter to bladder anastomosis was completely disrupted, repaired the R external iliac artery with bovine pericardial patch, repaired right external iliac vein, performed thrombectomy of RLE veins, Intra op received 3u PRBC.   ·	Extubated 1/11  ·	1/13 Saddle PE, with right heart strain and complicated by PEA arrest followed by Torsade, ROSC achieved after ACLS; re-intubated  ·	1/15 Renal US with 13.7 x 5.0 x 1.5 cm in RLQ transplant bed; s/p bedside IR guided drainage, pigtail in place. Fluid cx to date with NG  ·	1/21 CT C/A/P showing persistent collection, catheter at superior portion of collection  ·	1/22 IR pigtail drain repositioned and upsized to 12Fr catheter w/ increased output  ·	1/23 Transitioned to Eliquis from Heparin drip  ·	2/1  AVF infiltrated/ w/ - arterial extrav/ bleeding  CW hematoma     Interval events:    - S/P AFV cannulation for HD 2/1 with infiltration causing extravasation/bleeding to the left upper arm and chest noted on CTA  - 2/3: Left upper cavity arteriography demonstrated a tiny pseudoaneurysm arising from a distal branch of the left deep brachial artery. The tiny feeder artery was dissected resulting in resolution of the pseudoaneurysm.  - H/H remains stable.   - Restart Eliquis at low dose of 2.5mg and monitor for bleeding   - LUE edema with tenderness under axilla along rib cage and across left chest is improving.   - anuric. S/p HD via [facility].   - Still with 2+ LE edema  - TMax 36.7 WBC down to 15.7. On meropenem and micafungin.       cxs:   12/29: Urine Cx: Enterobacter Cloacae  1/1 OR Perinephric collection Cx:  Carbapenem resistant Ent Cloacae, E. Coli  1/1: R kidney abscess swab: Enterobacter Cloacae  1/1  OR tissue: Enterobacter Cloacae  1/5: Urine Cx: Enteropbacter Cloacae  1/5: Skin incision Cx (bedside): NG  1/6: Adominal fluid Cx from IR:  NG   1/7: Urine Cx:  NG  1/7: BCX2:  NG  1/10 retroperit hematoma - enerobacter   1/10 bladder hematoma - enterobacter  1/15 Drainage fluid - NGTD  1/16: BC X2: NGTD  1/18: SPutum: Normal harvey  1/21: BC X2: NTGD  1/22: IR body fluid Cx: GNR pre-milian    Potential Discharge date: pending clinical improvement          MEDICATIONS  (STANDING):  allopurinol 100 milliGRAM(s) Oral daily  aMIOdarone    Tablet 200 milliGRAM(s) Oral daily  apixaban 2.5 milliGRAM(s) Oral every 12 hours  brimonidine 0.2% Ophthalmic Solution 1 Drop(s) Both EYES three times a day  chlorhexidine 2% Cloths 1 Application(s) Topical <User Schedule>  epoetin trey Injectable 07472 Unit(s) IV Push <User Schedule>  famotidine    Tablet 20 milliGRAM(s) Oral daily  gabapentin 100 milliGRAM(s) Oral every 12 hours  latanoprost 0.005% Ophthalmic Solution 1 Drop(s) Both EYES daily  meropenem  IVPB 500 milliGRAM(s) IV Intermittent every 24 hours  metoprolol tartrate 25 milliGRAM(s) Oral every 12 hours  micafungin IVPB 100 milliGRAM(s) IV Intermittent every 24 hours  Nephro-candace 1 Tablet(s) Oral daily  polyethylene glycol 3350 17 Gram(s) Oral daily  senna 2 Tablet(s) Oral at bedtime    MEDICATIONS  (PRN):  acetaminophen   Tablet .. 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  Carpal tunnel syndrome  Brain cyst: had MRI recently- now gone  Anemia  Gastric ulcer: Long time ago  Rheumatoid arthritis  Renal transplant recipient  Breakdown (mechanical) of penile (implanted) prosthesis, sequela  End-stage renal disease (ESRD): PERMACATH PLACEMENT  Abscess of left buttock: s/p I &amp; D  AV fistula: placement left lower arm  S/P cystoscopy: stent placement &amp; removal for kidney stones, lithiotripsy  s/p Lt Carpal tunnel: 2011      Vital Signs Last 24 Hrs  T(C): 37 (07 Feb 2020 07:00), Max: 37.8 (06 Feb 2020 13:58)  T(F): 98.6 (07 Feb 2020 07:00), Max: 100 (06 Feb 2020 13:58)  HR: 80 (07 Feb 2020 10:07) (69 - 85)  BP: 146/68 (07 Feb 2020 10:07) (107/55 - 157/64)  BP(mean): 98 (07 Feb 2020 10:00) (76 - 106)  RR: 29 (07 Feb 2020 10:07) (7 - 32)  SpO2: 97% (07 Feb 2020 10:07) (94% - 100%)    I&O's Summary    06 Feb 2020 07:01  -  07 Feb 2020 07:00  --------------------------------------------------------  IN: 350 mL / OUT: 2000 mL / NET: -1650 mL    07 Feb 2020 07:01  -  07 Feb 2020 10:46  --------------------------------------------------------  IN: 150 mL / OUT: 0 mL / NET: 150 mL                              8.0    15.77 )-----------( 430      ( 06 Feb 2020 22:42 )             25.0     02-06    137  |  96  |  18  ----------------------------<  104<H>  3.9   |  26  |  4.33<H>    Ca    8.4      06 Feb 2020 22:42  Phos  3.3     02-06  Mg     2.0     02-06    TPro  5.9<L>  /  Alb  2.1<L>  /  TBili  0.8  /  DBili  0.4<H>  /  AST  18  /  ALT  <5<L>  /  AlkPhos  197<H>  02-06      REVIEW OF SYSTEMS  Gen: + weakness   Skin: No rashes  Head/Eyes/Ears/Mouth: No headache; Normal hearing; Normal vision w/o blurriness; No sinus pain/discomfort, sore throat  Respiratory: no SOB  CV: No chest pain, PND, orthopnea  GI: incisional tenderness   : anuric  MSK: c/o LUE pain and tenderness but reports improvement.   Neuro: No dizziness/lightheadedness, weakness, seizures, numbness, tingling  Heme: No easy bruising or bleeding  Endo: No heat/cold intolerance  Psych: No significant nervousness, anxiety, stress, depression      PHYSICAL EXAM:    Constitutional: NAD  	Eyes: Anicteric, PERRLA  	Respiratory: CTA b/l  	Cardiovascular: RRR  	Gastrointestinal: Soft abdomen, appropriate incisional TTP, ND. Wound vac ss/murky output    Genitourinary:  anuric, large amount scrotal edema     Extremities: LUE and left chest with +3 edema; less firm and tender to touch as compared to yesterday; superficial skin tear on LUEb/l 2+ lower ext edema with TEDS stockings, + flank edema, RUE edema also improving.   	Vascular: DP signal bilateral, L AVF palpable  	Neurological: A&O x3.  	Skin: no new lesions/ulcerations  	Musculoskeletal: Moving all extremities

## 2020-02-07 NOTE — PROGRESS NOTE ADULT - SUBJECTIVE AND OBJECTIVE BOX
HISTORY  49y Male with a PMHx of HTN, gout, glaucoma, RA, NET of pancreas s/p robotic distal pancreatectomy & splenectomy (2015 at [place]), and CKD stage V on hemodialysis via LUE AV fistula 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 Patient was admitted and hospital course is as follows:    12/27 - started on Tamiflu, Duplex of the transplanted kidney demonstrated an increase in the perinephric hematoma from 5.7 cm to 9.4 cm  12/29 - patient reporting dysuria so urine culture sent, which was positive for Enterobacter cloacae  01/01 - s/p right groin exploration, washout of the perinephric hematoma, and biopsy of the transplanted kidney, cultures of the hematoma positive for Enterobacter cloacae & E. coli, biopsy demonstrates  01/05 - patient noted to be febrile and hypotensive with new episodes of wide complex tachycardia on telemetry, labs significant for worsening leukocytosis, cultures sent, CT scan obtained & demonstrated a persistent perinephric collection, admitted to SICU for hemodynamic monitoring  01/06 - FNA w/ IR with aspiration of 18 mL of clear & pale yellow fluid but cultures negative for any growth, patient began having gross hematuria requiring CBI  01/07 - repeat imaging demonstrated no change in the size of the perinephric collection  01/08 - RLE edema was noted so a LE Duplex was obtained, which demonstrated a right external iliac & common femoral vein DVT, vascular surgery consulted, patient started on a heparin infusion  01/10 - patient became altered with worsening hematuria despite CBI, patient was taken to IR for an angiogram, which demonstrated a pseudoaneurysm of the transplant renal artery & external iliac artery anastomosis so the pseudoaneurysm was stented & an IVC filter was placed, patient taken to the OR and is s/p right groin exploration, washout of the RP space, transplant nephrectomy w/ repair of the right external iliac artery w/ a bovine patch & removal of the stent placed by IR, and thrombectomy of the RLE veins, returned to SICU still intubated  01/13 - acutely bradycardic and hypotensive progressing into PEA arrest with progression into torsades requiring ACLS for a total of ~12 mins, imaging revealed saddle PE with TTE demonstrating RV strain, PERT team consulted, management with heparin infusion as patient is not a tPA candidate, did not require vasopressors shortly after ROSC  01/15 - ultrasound with large right iliac fossa collection s/p percutaneous drainage by IR, 220 mL of dark red fluid drained and 10 Fr drain was left in placed  01/16 - extubated  01/19 - RUE Duplex with thrombosis of the brachial & cephalic veins, RLQ ultrasound with interval decrease in right iliac fossa collection  01/21 - CTA chest and CT abdomen & pelvis obtained demonstrating known RLL and LLL lobar artery PEs but resolution of PE in bilateral pulmonary arteries, new BLL wedge-like opacities likely representing pulmonary infarcts, and interval decrease in RLQ fluid collection  01/22 - RLQ IR drain upsized by IR from 10 Fr to 12 Fr  01/23 - Changed heparin infusion to Eliquis, stable for transfer to floor on 1/25.   1/23-2/1: No acute issues, rehab planning. However on 2/1 while undergoing  hemodialysis through L AVF, had massive infiltration, transferred to Surgical Intensive Care Unit for q1h neurovascular checks with concern for upper arm compartment syndrome  2/2-2/3: IR LUE angiogram, tiny PSA from distal branches of deep brachial artery noted and embolized, no venous injury noted at AVF  2/4: RIJ shiley placed as temporary HD access    24 HOUR EVENTS:   --HD today through RIJ Shiley, 2000cc removed   --no other acute events overnight     SUBJECTIVE/ROS:  [ ] A ten-point review of systems was otherwise negative except as noted.  [ ] Due to altered mental status/intubation, subjective information were not able to be obtained from the patient. History was obtained, to the extent possible, from review of the chart and collateral sources of information.    NEURO:  Exam: awake, alert, oriented  Meds: acetaminophen   Tablet .. 975 milliGRAM(s) Oral every 6 hours PRN Mild Pain (1 - 3)  gabapentin 100 milliGRAM(s) Oral every 12 hours  HYDROmorphone  Injectable 0.5 milliGRAM(s) IV Push every 6 hours PRN Breakthrough Pain  oxyCODONE    IR 10 milliGRAM(s) Oral every 6 hours PRN Severe Pain (7 - 10)  oxyCODONE    IR 5 milliGRAM(s) Oral every 4 hours PRN Moderate Pain (4 - 6)    [x] Adequacy of sedation and pain control has been assessed and adjusted      RESPIRATORY  RR: 17 (02-07-20 @ 01:00) (7 - 32)  SpO2: 96% (02-07-20 @ 01:00) (94% - 100%)  Wt(kg): --  Exam: unlabored, clear to auscultation bilaterally    Meds:       CARDIOVASCULAR  HR: 72 (02-07-20 @ 01:00) (72 - 85)  BP: 118/60 (02-07-20 @ 01:00) (107/55 - 157/64)  BP(mean): 83 (02-07-20 @ 01:00) (76 - 106)  ABP: --  ABP(mean): --  Wt(kg): --  CVP(cm H2O): --      Exam: regular rate and rhythm  Cardiac Rhythm: sinus  Perfusion     [x]Adequate   [ ]Inadequate  Mentation   [x]Normal       [ ]Reduced  Extremities  [x]Warm         [ ]Cool  Volume Status [ ]Hypervolemic [x]Euvolemic [ ]Hypovolemic  Meds: aMIOdarone    Tablet 200 milliGRAM(s) Oral daily      GI/NUTRITION  Exam: soft, nontender, nondistended, incision C/D/I  Diet:  Meds: famotidine    Tablet 20 milliGRAM(s) Oral daily  polyethylene glycol 3350 17 Gram(s) Oral daily  senna 2 Tablet(s) Oral at bedtime      GENITOURINARY  I&O's Detail    02-05 @ 07:01  -  02-06 @ 07:00  --------------------------------------------------------  IN:    Oral Fluid: 1070 mL    Packed Red Blood Cells: 350 mL    Solution: 100 mL  Total IN: 1520 mL    OUT:    Bulb: 30 mL  Total OUT: 30 mL    Total NET: 1490 mL      02-06 @ 07:01  -  02-07 @ 01:27  --------------------------------------------------------  IN:    Solution: 50 mL    Solution: 100 mL  Total IN: 150 mL    OUT:    Other: 2000 mL  Total OUT: 2000 mL    Total NET: -1850 mL          02-06    137  |  96  |  18  ----------------------------<  104<H>  3.9   |  26  |  4.33<H>    Ca    8.4      06 Feb 2020 22:42  Phos  3.3     02-06  Mg     2.0     02-06    TPro  5.9<L>  /  Alb  2.1<L>  /  TBili  0.8  /  DBili  0.4<H>  /  AST  18  /  ALT  <5<L>  /  AlkPhos  197<H>  02-06    [ ] Kennedy catheter, indication: N/A  Meds: Nephro-candace 1 Tablet(s) Oral daily        HEMATOLOGIC  Meds:   [x] VTE Prophylaxis                        8.0    15.77 )-----------( 430      ( 06 Feb 2020 22:42 )             25.0     PT/INR - ( 06 Feb 2020 22:42 )   PT: 14.0 sec;   INR: 1.21 ratio         PTT - ( 06 Feb 2020 22:42 )  PTT:32.1 sec  Transfusion     [ ] PRBC   [ ] Platelets   [ ] FFP   [ ] Cryoprecipitate      INFECTIOUS DISEASES  WBC Count: 15.77 K/uL (02-06 @ 22:42)  WBC Count: 15.83 K/uL (02-06 @ 12:14)    RECENT CULTURES:    Meds: epoetin trey Injectable 52584 Unit(s) IV Push <User Schedule>  meropenem  IVPB 500 milliGRAM(s) IV Intermittent every 24 hours  micafungin IVPB 100 milliGRAM(s) IV Intermittent every 24 hours      ENDOCRINE  CAPILLARY BLOOD GLUCOSE    Meds: allopurinol 100 milliGRAM(s) Oral daily        ACCESS DEVICES:  [ ] Peripheral IV  [ ] Central Venous Line	[ ] R	[ ] L	[ ] IJ	[ ] Fem	[ ] SC	Placed:   [ ] Arterial Line		[ ] R	[ ] L	[ ] Fem	[ ] Rad	[ ] Ax	Placed:   [ ] PICC:					[ ] Mediport  [ ] Urinary Catheter, Date Placed:   [x] Necessity of urinary, arterial, and venous catheters discussed    OTHER MEDICATIONS:  brimonidine 0.2% Ophthalmic Solution 1 Drop(s) Both EYES three times a day  chlorhexidine 2% Cloths 1 Application(s) Topical <User Schedule>  latanoprost 0.005% Ophthalmic Solution 1 Drop(s) Both EYES daily      CODE STATUS: full code

## 2020-02-07 NOTE — PROGRESS NOTE ADULT - SUBJECTIVE AND OBJECTIVE BOX
[facility] NEPHROLOGY SERVICES, [facility]  NEPHROLOGY AND HYPERTENSION  [address]  [address]  [place] [address]  [phone]    MD GIANA GUERRERO, MD JANAE MCDONNELL, MD BARBARA MONET, MD SALUD GARZA, MD CATRINA WOO MD    Awake, no distress    Vital Signs Last 24 Hrs  T(C): 36.8 (02-07-20 @ 16:15), Max: 37.8 (02-06-20 @ 19:00)  T(F): 98.2 (02-07-20 @ 16:15), Max: 100 (02-06-20 @ 19:00)  HR: 80 (02-07-20 @ 16:15) (69 - 83)  BP: 162/89 (02-07-20 @ 16:15) (111/58 - 162/89)  BP(mean): 97 (02-07-20 @ 11:00) (81 - 102)  RR: 18 (02-07-20 @ 16:15) (7 - 32)  SpO2: 98% (02-07-20 @ 16:15) (94% - 100%)    Respiratory: decreased BS at bases  Cardiovascular: S1 S2  Gastrointestinal: soft NT ND +BS  Extremities:  LUE wrapped ; b/l LE edema    acetaminophen   Tablet .. 975 milliGRAM(s) Oral every 6 hours PRN  allopurinol 100 milliGRAM(s) Oral daily  aMIOdarone    Tablet 200 milliGRAM(s) Oral daily  apixaban 2.5 milliGRAM(s) Oral every 12 hours  brimonidine 0.2% Ophthalmic Solution 1 Drop(s) Both EYES three times a day  chlorhexidine 2% Cloths 1 Application(s) Topical <User Schedule>  epoetin trey Injectable 92126 Unit(s) IV Push <User Schedule>  famotidine    Tablet 20 milliGRAM(s) Oral daily  gabapentin 100 milliGRAM(s) Oral every 12 hours  HYDROmorphone  Injectable 0.5 milliGRAM(s) IV Push every 6 hours PRN  latanoprost 0.005% Ophthalmic Solution 1 Drop(s) Both EYES daily  meropenem  IVPB 500 milliGRAM(s) IV Intermittent every 24 hours  metoprolol tartrate 25 milliGRAM(s) Oral every 12 hours  micafungin IVPB 100 milliGRAM(s) IV Intermittent every 24 hours  Nephro-candace 1 Tablet(s) Oral daily  oxyCODONE    IR 10 milliGRAM(s) Oral every 6 hours PRN  oxyCODONE    IR 5 milliGRAM(s) Oral every 4 hours PRN  polyethylene glycol 3350 17 Gram(s) Oral daily  senna 2 Tablet(s) Oral at bedtime                        8.0    15.77 )-----------( 430      ( 06 Feb 2020 22:42 )             25.0     06 Feb 2020 22:42    137    |  96     |  18     ----------------------------<  104    3.9     |  26     |  4.33     Ca    8.4        06 Feb 2020 22:42  Phos  3.3       06 Feb 2020 22:42  Mg     2.0       06 Feb 2020 22:42    TPro  5.9    /  Alb  2.1    /  TBili  0.8    /  DBili  0.4    /  AST  18     /  ALT  <5     /  AlkPhos  197    06 Feb 2020 22:42    LIVER FUNCTIONS - ( 06 Feb 2020 22:42 )  Alb: 2.1 g/dL / Pro: 5.9 g/dL / ALK PHOS: 197 U/L / ALT: <5 U/L / AST: 18 U/L / GGT: x           PT/INR - ( 06 Feb 2020 22:42 )   PT: 14.0 sec;   INR: 1.21 ratio      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, DGF  S/p transplant kidney bx 12/13: ATN, focal TMA  S/p PLEX 12/13, 12/15; d/c-d 12/20 w/op HD 3 x wk  Re-adm 12/27 w/Flu A, infected carlos-nephric hematoma   S/p OR 1/1 for washout and repeat renal graft biopsy (ATN)  Dx w/RLE DVT  Developed gross hematuria  S/p OR 1/10 for infected carlos-nephric hematoma, hemorrhage involving pseudo-aneurysm of anastomosis of renal artery to external iliac vein,   s/p transplant nephrectomy, s/p IVCF   S/p PEA arrest 1/13, + PE, R retroperitoneal collection  S/p IR drainage of abd collection  RUE DVT, AC  Abx per ID  AVF infiltration 2/1 w/hematoma extending to L chest wall while on Eliquis, s/p PRBCs  HD tomorrow via temp HD catheter  F/u Hgb    [phone]

## 2020-02-07 NOTE — PROGRESS NOTE ADULT - ASSESSMENT
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 RVP with Influenza A s/p Tamiflu treatment course. Now with infected carlos-transplant hematoma.    1/1: s/p Ex-Lap and washout of infected hematoma (Enterobacter from cultures)  1/6: s/p IR guided drainage attempt of hematoma on 1/6 (NGTD from cultures)  1/10: s/p transplant nephrectomy, repair of right external iliac artery with a bovine pericardial patch, repair of right external iliac vein and thrombectomy of right lower extremity veins (Surgical cultures with Enterobacter)  1/15: s/p IR guided drainage (220cc of hematoma)  1/22: s/p IR drain repositioning and upsizing (Gram stain with GNR but so far NGTD)    Some of the Enterobacter isolates R and S to Ertapenem  per core lab Ertapenem at borderline of Susceptible and Resistant breakpoint  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   Given concern for intravascular infection I would treat for 6 weeks from 1/10 nephrectomy (End Date: 2/20/20)  I would continue Meropenem while admitted and switch to Ciprofloxacin on discharge    PEA arrest and Torsades on 1/13  CT C/A/P (1/13) with Saddle PE, interval increase in size of RP fluid collection (infected hematoma)  CT C/A/P (1/21) with pulmonary infarcts and residual hematoma (decreased in size)    Infiltrated LUE AVF with hematoma formation and active extravasation  Underwent IR angiography 9/3 with dissection of feeder artery with resolution of the pseudoaneurysm.  s/p R Chest Shiley Placement    Overall, LUE Hematoma, Leukocytosis (stable), Infected Hematoma, Saddle pulmonary embolus, Renal Transplant Recipient    --Continue Meropenem to 500 mg IV Q24H (anticipate switch to Cipro PO on discharge - End Date: 2/20/20)  --Would consider discontinuing Micafungin - no evidence of yeast in prior cultures.   --Continue to follow CBC with diff  --Continue to follow temperature curve  --Management of Saddle PE per Transplant and vascular teams    I will be away over this upcoming weekend. Please contact the Infectious Diseases Office with any further questions or concerns.     Dada Jacobo M.D.  [facility] Division of Infectious Disease  8AM-5PM: Pager Number [phone]  After Hours (or if no response): Please contact the Infectious Diseases Office at [phone]

## 2020-02-07 NOTE — PROGRESS NOTE ADULT - PROBLEM SELECTOR PLAN 1
Tolerated IHD yesterday with no issues. On 2/1/2020 he had a fistula infiltration/ hematoma which required  multiple transfusions and AC was held.  Seen by vascular - no intervention at this time.  Follows with primary nephrologist Dr Amaya for dialysis.

## 2020-02-07 NOTE — PROGRESS NOTE ADULT - ASSESSMENT
Assessment	  48 y/o male presenting with influenza A with a hospital course complicated by Infected transplant perinephric hematoma s/p right groin exploration, washout, & biopsy c/b persistent perinephric collection s/p FNA, Right external iliac & common femoral DVTs s/p IVC filter & thrombectomy of the RLE veins, Gross hematuria w/ pseudoaneurysm of the right external iliac & transplant renal artery seen on IR angiogram s/p right groin exploration, washout of the RP space, and transplant nephrectomy w/ repair of the right external iliac artery w/ a bovine patch, Iliac fossa collection s/p IR drainage, Cardiac arrest secondary to a saddle PE, now with L infiltration with likely small amount of ongoing active bleeding with large LUE hematoma while patient was on therapeutic anticoagulation.    Plan:  - Pain control  - Trend H&H  - f/u transplant sx re restarting Eliquis  - continue with compression and elevation  - continue to monitor neurovascular exam. no neurovascular deficits noted at this time   - continue excellent care per sicu / transplant    VASCULAR SURGERY [phone]

## 2020-02-07 NOTE — PROGRESS NOTE ADULT - ASSESSMENT
49M PMHx of ESRD on HD  ( via LUE AVF), HTN, Gout, Glaucoma, NET of pancreas, RA with hand contractures. s/p DDRT 12/8/19,  Post op course c/b DGF requiring HD. Renal bx x 2 c/w profound ATN.     Re-admitted with Influenza and perinephric hematoma. s/p hematoma evacuation/ abd washout and renal biopsy. Post op course c/b leukocytosis, infected hematoma, acute RLE DVT, and bleeding pseudoaneurysm at txp renal artery anastomosis. Now s/p graft nephrectomy. Course further complicated by saddle PE causing right heart strain and hemodynamic instability leading to cardiac arrest requiring ACLS,  Required IR drainage of  post-op collections x 2  now complicated by bleeding following cannulation of  LUE AVF with findings of a tiny pseudoaneurysm arising from a distal branch of the left deep brachial artery s/p dissection of a tiny feeder artery that resulted in resolution of the pseudoaneurysm.    [] DDRT c/b DGF/ATN/perinephric infected hematoma/pseudoneurysm/graft nephrectomy   - S/P AFV cannulation for HD yesterday c/b infiltration causing extravasation/bleeding to the left upper arm and chest noted on CTA  - S/P IR LUE arteriography showing a tiny pseudoaneurysm arising from a distal branch of the left deep brachial artery s/p dissection of a tiny feeder artery that resulted in resolution of the pseudoaneurysm.  - Daily CBC  - ID: denied permcath; s/p RIJ shiley placement. Per ID, needs two neg blood cultures before getting permcath.   - Eliquis 2.5 bid today  - Bedside IR drainage of RLQ fluid collection (1/15), drain repositioned 1/22 and cultures NTD  -  dc ed yesterday   - ID following;  cont Meropenem, Micafungin 100mg daily.   ID plan for discharge on cipro.  - pain control  - bowel regimen  -on pepcid.   - PT/OT/OOB.  VAC change per PT  - Keep B/L LE w/ compression stockings. Has gout pain on R knee, avoid covering the R knee. Apply compression stocking on R side up to above the knee level and apply ACE bandage up to mid/upper thigh.       [] Saddle PE, with right heart strain and complicated by PEA arrest followed by Torsade, ROSC achieved after ACLS  - vascular, CT and vascular cardiology following   -Repeat chest CT 1/28 noted Persistent bilateral lower lobe pulmonary emboli.   - CT 1/21: There are pulmonary emboli within the right lower lobar arteries and the left lower lobar arteries extending into the distal segments as seen on prior study. The pulmonary emboli seen in the right and left pulmonary arteries are no longer visualized. New distal to mid brachial DVT noted  - 1/19 Duplex: RUE Deep vein thrombosis in the mid to distal brachial vein.  - Eliquis/ASA held 2/1 in the setting of AVF bleed   - repeat ECHO with improved R heart strain    [] New Afib/Vtech  Cardiology following, BB low dose in the setting of WCT and hold BB on HD days  Continue amiodarone, card plan to continue for couple of months and will f/u as outpt    [] New acute DVT of R distal brachia vein , New acute DVT of R external iliac, common femoral, femoral veins, R soleal vein   - IVC filter placed 1/9, restart Eliquis today at low dose 2.5mg BID    [] Thrombocytosis  - PLT now 430; were previously in 800s; cont to trend.    -Heme consulted; likely reactive. Heme work up in progress    [] Dispo  - Downgrade to 6 Hemal  -discharge to acute rehab once medically stable.

## 2020-02-07 NOTE — PROGRESS NOTE ADULT - ASSESSMENT
Sai Prado is a 50 y/o male presenting with influenza A with a hospital course complicated by:  - Infected transplant perinephric hematoma s/p right groin exploration, washout, & biopsy c/b persistent perinephric collection s/p FNA  - Right external iliac & common femoral DVTs s/p IVC filter & thrombectomy of the RLE veins  - Gross hematuria w/ pseudoaneurysm of the right external iliac & transplant renal artery seen on IR angiogram s/p right groin exploration, washout of the RP space, and transplant nephrectomy w/ repair of the right external iliac artery w/ a bovine patch  - Iliac fossa collection s/p IR drainage  - Cardiac arrest secondary to a saddle PE    PLAN:  Neuro: No active issues.   - Pain control Tylenol, oxycodone, gabapentin, dilaudid   - LUE tenderness w/o neurological sx. Interval improvement; continue to monitor    Resp: saddle PE with pulmonary infarct sequela. Largely resolved on most recent imaging  - No active issues.   - No further anticoagulation per Interventional Radiology recommendations, Hematology following    CV: obstructive shock secondary to PE (resolved), s/p PEA arrest with progression to torsades de pointes  - Monitor vital signs  - TTE (2/3) w/ grossly normal RV and LV  - Continue Amiodarone for prior Torsades  - Holding ASA 81 and metoprolol 12.5 BID.     GI: no acute issues  - Renal diet as tolerated.  - Continue Pepcid.   - Bowel regimen with senna & Miralax.  - Continue wound vac to Right groin    Renal: CKD stage V s/p DDRT c/b DGF & perinephric collection s/p right groin exploration, washout, & biopsy c/b persistent perinephric collection s/p FNA; gross hematuria w/ pseudoaneurysm of the right external iliac & transplant renal artery s/p right groin exploration, washout of the RP space, & transplant nephrectomy w/ repair of the right external iliac artery w/ a bovine patch; persistent right iliac fossa collection s/p IR drainage  - Do not use LUE KARLAF  - ALCIDES nina for HD to remain in place while long term access pending  - On nephro-candace.   -HD 2/6 removed 2000cc, HD scheduled next for Saturday 2/8     Heme: anemia of chronic diseases, saddle PE, right external iliac & common femoral DVTs s/p IVC filter  - S/p LUE arteriography with no bleeding vessel identified. Will continue to monitor CBCs.   - Continue Epogen.   - Holding Eliquis for now with potential of restarting Eliquis tomorrow     ID: Enterobacter cloacae UTI, transplant perinephric collection and right iliac fossa collection with carbapenem resistant Enterobacter cloacae (CRE) and E. coli.  - On 6 week course of Meropenem, micafungin per Transplant, with eventual plans to switch to PO Ciprofloxacin on discharge. Antibiotic end date: 02/20/20     Endo: No acute issues  - Monitor glucose on BMP    Misc: Gout, glaucoma  - Home allopurinol for gout  - Home eyedrops (latanoprost, dorzolamide, & brimonidine) for glaucoma    Disposition:  - Surgical Intensive Care Unit

## 2020-02-07 NOTE — PROGRESS NOTE ADULT - ATTENDING COMMENTS
Patient seen and examined and agree with above.   Remained hemodynamically stable overnight.  Responded to one unit PRBC overnight.   Femoral introducer discontinued this morning.    Awake, alert and oriented.   Normal respiratory state- room air SpO2 97%  Bilateral subsegmental lower lobe pulmonary emboli- will plan for eloquis   Essential hypertension- started metoprolol with goal SBP < 160 mmHg    Tolerating renal diet  Improving leukocytosis   esrd S/P transplant nephrectomy -follow up with nephrology   Continue meropenem and micafungin and will follow up with ID and transplant.     Patient ok for transfer to floor

## 2020-02-07 NOTE — PROGRESS NOTE ADULT - SUBJECTIVE AND OBJECTIVE BOX
Follow Up:  infected hematoma    Interval History: no chills or fevers. pain in the LUE improving. pain in abdomen improving.     REVIEW OF SYSTEMS  [  ] ROS unobtainable because:    [ x ] All other systems negative except as noted below    Constitutional:  [ ] fever [ ] chills  [ ] weight loss  [ ] weakness  Skin:  [ ] rash [ ] phlebitis	  Eyes: [ ] icterus [ ] pain  [ ] discharge	  ENMT: [ ] sore throat  [ ] thrush [ ] ulcers [ ] exudates  Respiratory: [ ] dyspnea [ ] hemoptysis [ ] cough [ ] sputum	  Cardiovascular:  [ ] chest pain [ ] palpitations [ ] edema	  Gastrointestinal:  [ ] nausea [ ] vomiting [ ] diarrhea [ ] constipation [x ] pain	  Genitourinary:  [ ] dysuria [ ] frequency [ ] hematuria [ ] discharge [ ] flank pain  [ ] incontinence  Musculoskeletal:  [ ] myalgias [ ] arthralgias [ ] arthritis  [ ] back pain +LUE edema  Neurological:  [ ] headache [ ] seizures  [ ] confusion/altered mental status    Allergies  coconut (Anaphylaxis)  morphine (Pruritus; Rash)        ANTIMICROBIALS:  meropenem  IVPB 500 every 24 hours  micafungin IVPB 100 every 24 hours      OTHER MEDS:  MEDICATIONS  (STANDING):  acetaminophen   Tablet .. 975 every 6 hours PRN  allopurinol 100 daily  aMIOdarone    Tablet 200 daily  apixaban 2.5 every 12 hours  epoetin trey Injectable 91742 <User Schedule>  famotidine    Tablet 20 daily  gabapentin 100 every 12 hours  HYDROmorphone  Injectable 0.5 every 6 hours PRN  metoprolol tartrate 25 every 12 hours  oxyCODONE    IR 10 every 6 hours PRN  oxyCODONE    IR 5 every 4 hours PRN  polyethylene glycol 3350 17 daily  senna 2 at bedtime      Vital Signs Last 24 Hrs  T(C): 36.8 (07 Feb 2020 16:15), Max: 37.8 (06 Feb 2020 19:00)  T(F): 98.2 (07 Feb 2020 16:15), Max: 100 (06 Feb 2020 19:00)  HR: 80 (07 Feb 2020 16:15) (69 - 83)  BP: 162/89 (07 Feb 2020 16:15) (111/58 - 162/89)  BP(mean): 97 (07 Feb 2020 11:00) (81 - 102)  RR: 18 (07 Feb 2020 16:15) (7 - 32)  SpO2: 98% (07 Feb 2020 16:15) (94% - 100%)    PHYSICAL EXAMINATION:  General: Alert and Awake, NAD  HEENT: PERRL, EOMI  Neck: Supple  Chest: R Chest Shiley (No surrounding erythema, drainage or tenderness to palpation)  Cardiac: RRR, No M/R/G  Resp: CTAB, No Wh/Rh/Ra  Abdomen: +RLQ drain with serosanguinous drainage. +RLQ wound vac over transplant nephrectomy site, NBS, mild tenderness to palpation over RLQ  MSK: +LUE edema and pain at proximal arm. 1-2+ RLE edema. No Calf tenderness  Skin: No rashes or lesions. Skin is warm and dry to the touch.   Neuro: Alert and Awake. CN 2-12 Grossly intact. Moves all four extremities spontaneously.  Psych: Calm, Pleasant, Cooperative                          8.0    15.77 )-----------( 430      ( 06 Feb 2020 22:42 )             25.0       02-06    137  |  96  |  18  ----------------------------<  104<H>  3.9   |  26  |  4.33<H>    Ca    8.4      06 Feb 2020 22:42  Phos  3.3     02-06  Mg     2.0     02-06    TPro  5.9<L>  /  Alb  2.1<L>  /  TBili  0.8  /  DBili  0.4<H>  /  AST  18  /  ALT  <5<L>  /  AlkPhos  197<H>  02-06          MICROBIOLOGY:  v  .Blood Blood  01-24-20   No growth at 5 days.  --  --      .Body Fluid RLQ Fluid Collection  01-24-20   No growth  --  --      .Blood Blood  01-24-20   No growth at 5 days.  --  --      .Body Fluid Abdominal Fluid  01-22-20   No growth at 5 days  --    polymorphonuclear leukocytes seen  Gram Negative Rods seen  by cytocentrifuge      .Blood Blood  01-21-20   No growth at 5 days.  --  --      .Blood Blood  01-21-20   No growth at 5 days.  --  --      .Sputum Sputum, trap  01-18-20   Normal Respiratory Gillian present  --    No polymorphonuclear leukocytes per low power field  Rare Squamous epithelial cells per low power field  Rare Gram positive cocci in pairs per oil power field      .Blood Blood  01-16-20   No growth at 5 days.  --  --      .Body Fluid Abdominal Fluid  01-15-20   No growth at 5 days  --    polymorphonuclear leukocytes seen  No organisms seen  by cytocentrifuge      .Tissue Other  01-10-20   No growth at 5 days  --  Enterobacter cloacae (Carbapenem Resistant)    RADIOLOGY:    <The imaging below has been reviewed and visualized by me independently. Findings as detailed in report below>    EXAM:  CT ANGIO UPR EXT (W)AW IC LT                EXAM:  CT ANGIO CHEST (W)AW IC                      PROCEDURE DATE:  02/01/2020    Large left upper extremity hematoma measuring 17.3 x 5.0 cm which extends into the left axilla with active arterial extravasation which persists on more delayed imaging.

## 2020-02-08 LAB
ALBUMIN SERPL ELPH-MCNC: 2.6 G/DL — LOW (ref 3.3–5)
ALP SERPL-CCNC: 228 U/L — HIGH (ref 40–120)
ALT FLD-CCNC: <5 U/L — LOW (ref 10–45)
ANION GAP SERPL CALC-SCNC: 16 MMOL/L — SIGNIFICANT CHANGE UP (ref 5–17)
APTT BLD: 37.5 SEC — HIGH (ref 27.5–36.3)
AST SERPL-CCNC: 19 U/L — SIGNIFICANT CHANGE UP (ref 10–40)
BILIRUB DIRECT SERPL-MCNC: 0.4 MG/DL — HIGH (ref 0–0.2)
BILIRUB INDIRECT FLD-MCNC: 0.6 MG/DL — SIGNIFICANT CHANGE UP (ref 0.2–1)
BILIRUB SERPL-MCNC: 1 MG/DL — SIGNIFICANT CHANGE UP (ref 0.2–1.2)
BUN SERPL-MCNC: 28 MG/DL — HIGH (ref 7–23)
CALCIUM SERPL-MCNC: 8.9 MG/DL — SIGNIFICANT CHANGE UP (ref 8.4–10.5)
CHLORIDE SERPL-SCNC: 95 MMOL/L — LOW (ref 96–108)
CO2 SERPL-SCNC: 25 MMOL/L — SIGNIFICANT CHANGE UP (ref 22–31)
CREAT SERPL-MCNC: 5.75 MG/DL — HIGH (ref 0.5–1.3)
GLUCOSE SERPL-MCNC: 92 MG/DL — SIGNIFICANT CHANGE UP (ref 70–99)
HCT VFR BLD CALC: 25.7 % — LOW (ref 39–50)
HGB BLD-MCNC: 8.1 G/DL — LOW (ref 13–17)
INR BLD: 1.28 RATIO — HIGH (ref 0.88–1.16)
MAGNESIUM SERPL-MCNC: 2.1 MG/DL — SIGNIFICANT CHANGE UP (ref 1.6–2.6)
MCHC RBC-ENTMCNC: 30.2 PG — SIGNIFICANT CHANGE UP (ref 27–34)
MCHC RBC-ENTMCNC: 31.5 GM/DL — LOW (ref 32–36)
MCV RBC AUTO: 95.9 FL — SIGNIFICANT CHANGE UP (ref 80–100)
NRBC # BLD: 0 /100 WBCS — SIGNIFICANT CHANGE UP (ref 0–0)
PHOSPHATE SERPL-MCNC: 3.6 MG/DL — SIGNIFICANT CHANGE UP (ref 2.5–4.5)
PLATELET # BLD AUTO: 615 K/UL — HIGH (ref 150–400)
POTASSIUM SERPL-MCNC: 4.5 MMOL/L — SIGNIFICANT CHANGE UP (ref 3.5–5.3)
POTASSIUM SERPL-SCNC: 4.5 MMOL/L — SIGNIFICANT CHANGE UP (ref 3.5–5.3)
PROCALCITONIN SERPL-MCNC: 1.01 NG/ML — HIGH (ref 0.02–0.1)
PROT SERPL-MCNC: 6.5 G/DL — SIGNIFICANT CHANGE UP (ref 6–8.3)
PROTHROM AB SERPL-ACNC: 14.8 SEC — HIGH (ref 10–12.9)
RBC # BLD: 2.68 M/UL — LOW (ref 4.2–5.8)
RBC # FLD: 15.9 % — HIGH (ref 10.3–14.5)
SODIUM SERPL-SCNC: 136 MMOL/L — SIGNIFICANT CHANGE UP (ref 135–145)
WBC # BLD: 16.64 K/UL — HIGH (ref 3.8–10.5)
WBC # FLD AUTO: 16.64 K/UL — HIGH (ref 3.8–10.5)

## 2020-02-08 PROCEDURE — 99232 SBSQ HOSP IP/OBS MODERATE 35: CPT

## 2020-02-08 PROCEDURE — 99231 SBSQ HOSP IP/OBS SF/LOW 25: CPT

## 2020-02-08 PROCEDURE — 99232 SBSQ HOSP IP/OBS MODERATE 35: CPT | Mod: GC

## 2020-02-08 RX ADMIN — BRIMONIDINE TARTRATE 1 DROP(S): 2 SOLUTION/ DROPS OPHTHALMIC at 07:00

## 2020-02-08 RX ADMIN — Medication 25 MILLIGRAM(S): at 06:57

## 2020-02-08 RX ADMIN — OXYCODONE HYDROCHLORIDE 10 MILLIGRAM(S): 5 TABLET ORAL at 21:47

## 2020-02-08 RX ADMIN — OXYCODONE HYDROCHLORIDE 10 MILLIGRAM(S): 5 TABLET ORAL at 16:54

## 2020-02-08 RX ADMIN — APIXABAN 2.5 MILLIGRAM(S): 2.5 TABLET, FILM COATED ORAL at 06:57

## 2020-02-08 RX ADMIN — ERYTHROPOIETIN 10000 UNIT(S): 10000 INJECTION, SOLUTION INTRAVENOUS; SUBCUTANEOUS at 13:10

## 2020-02-08 RX ADMIN — BRIMONIDINE TARTRATE 1 DROP(S): 2 SOLUTION/ DROPS OPHTHALMIC at 21:17

## 2020-02-08 RX ADMIN — AMIODARONE HYDROCHLORIDE 200 MILLIGRAM(S): 400 TABLET ORAL at 07:01

## 2020-02-08 RX ADMIN — OXYCODONE HYDROCHLORIDE 10 MILLIGRAM(S): 5 TABLET ORAL at 06:59

## 2020-02-08 RX ADMIN — OXYCODONE HYDROCHLORIDE 10 MILLIGRAM(S): 5 TABLET ORAL at 17:24

## 2020-02-08 RX ADMIN — MEROPENEM 100 MILLIGRAM(S): 1 INJECTION INTRAVENOUS at 16:47

## 2020-02-08 RX ADMIN — BRIMONIDINE TARTRATE 1 DROP(S): 2 SOLUTION/ DROPS OPHTHALMIC at 14:24

## 2020-02-08 RX ADMIN — Medication 25 MILLIGRAM(S): at 17:23

## 2020-02-08 RX ADMIN — HYDROMORPHONE HYDROCHLORIDE 0.5 MILLIGRAM(S): 2 INJECTION INTRAMUSCULAR; INTRAVENOUS; SUBCUTANEOUS at 11:06

## 2020-02-08 RX ADMIN — Medication 100 MILLIGRAM(S): at 11:34

## 2020-02-08 RX ADMIN — OXYCODONE HYDROCHLORIDE 10 MILLIGRAM(S): 5 TABLET ORAL at 21:17

## 2020-02-08 RX ADMIN — CHLORHEXIDINE GLUCONATE 1 APPLICATION(S): 213 SOLUTION TOPICAL at 11:33

## 2020-02-08 RX ADMIN — LATANOPROST 1 DROP(S): 0.05 SOLUTION/ DROPS OPHTHALMIC; TOPICAL at 11:34

## 2020-02-08 RX ADMIN — GABAPENTIN 100 MILLIGRAM(S): 400 CAPSULE ORAL at 17:23

## 2020-02-08 RX ADMIN — MICAFUNGIN SODIUM 105 MILLIGRAM(S): 100 INJECTION, POWDER, LYOPHILIZED, FOR SOLUTION INTRAVENOUS at 17:23

## 2020-02-08 RX ADMIN — FAMOTIDINE 20 MILLIGRAM(S): 10 INJECTION INTRAVENOUS at 11:34

## 2020-02-08 RX ADMIN — OXYCODONE HYDROCHLORIDE 10 MILLIGRAM(S): 5 TABLET ORAL at 07:29

## 2020-02-08 RX ADMIN — GABAPENTIN 100 MILLIGRAM(S): 400 CAPSULE ORAL at 06:57

## 2020-02-08 RX ADMIN — HYDROMORPHONE HYDROCHLORIDE 0.5 MILLIGRAM(S): 2 INJECTION INTRAMUSCULAR; INTRAVENOUS; SUBCUTANEOUS at 10:36

## 2020-02-08 RX ADMIN — HYDROMORPHONE HYDROCHLORIDE 0.5 MILLIGRAM(S): 2 INJECTION INTRAMUSCULAR; INTRAVENOUS; SUBCUTANEOUS at 02:01

## 2020-02-08 RX ADMIN — APIXABAN 2.5 MILLIGRAM(S): 2.5 TABLET, FILM COATED ORAL at 17:23

## 2020-02-08 NOTE — PROGRESS NOTE ADULT - ASSESSMENT
49M PMHx of ESRD on HD  ( via LUE AVF), HTN, Gout, Glaucoma, NET of pancreas, RA with hand contractures. s/p DDRT 12/8/19,  Post op course c/b DGF requiring HD. Renal bx x 2 c/w profound ATN.     Re-admitted with Influenza and perinephric hematoma. s/p hematoma evacuation/ abd washout and renal biopsy. Post op course c/b leukocytosis, infected hematoma, acute RLE DVT, and bleeding pseudoaneurysm at txp renal artery anastomosis. Now s/p graft nephrectomy. Course further complicated by saddle PE causing right heart strain and hemodynamic instability leading to cardiac arrest requiring ACLS,  Required IR drainage of  post-op collections x 2  now complicated by bleeding following cannulation of  LUE AVF with findings of a tiny pseudoaneurysm arising from a distal branch of the left deep brachial artery s/p dissection of a tiny feeder artery that resulted in resolution of the pseudoaneurysm.    [] DDRT c/b DGF/ATN/perinephric infected hematoma/pseudoneurysm/graft nephrectomy   - S/P AFV cannulation for HD c/b infiltration causing extravasation/bleeding to the left upper arm and chest noted on CTA  - S/P IR LUE arteriography showing a tiny pseudoaneurysm arising from a distal branch of the left deep brachial artery s/p dissection of a tiny feeder artery that resulted in resolution of the pseudoaneurysm.  - Daily CBC  - ID: ALCIDES nina placed 2/4. Per ID, needs two neg blood cultures before getting permcath.   - Increase Eliquis to 5mg BID   - Bedside IR drainage of RLQ fluid collection (1/15), drain repositioned 1/22 and cultures NTD  - ID following;  cont Meropenem, Micafungin 100mg daily.   ID plan for discharge on cipro.  - pain control  - bowel regimen  -on pepcid.   - PT/OT/OOB.  VAC change per PT  - Thigh high compression stocking to LLE  - Knee high compression stocking to RLE with ace wrap from knee to thigh.    - ACE wrap LUE from hand to shoulder.   - Left arm/posterior axilla  ultrasound today to assess swelling      [] Saddle PE, with right heart strain and complicated by PEA arrest followed by Torsade, ROSC achieved after ACLS  - vascular, CT and vascular cardiology following   -Repeat chest CT 1/28 noted Persistent bilateral lower lobe pulmonary emboli.   - CT 1/21: There are pulmonary emboli within the right lower lobar arteries and the left lower lobar arteries extending into the distal segments as seen on prior study. The pulmonary emboli seen in the right and left pulmonary arteries are no longer visualized. New distal to mid brachial DVT noted  - 1/19 Duplex: RUE Deep vein thrombosis in the mid to distal brachial vein.  - repeat ECHO with improved R heart strain  - Increase Eliquis to 5mg BID today     [] New Afib/Vtech  Cardiology following, BB low dose in the setting of WCT and hold BB on HD days  Continue amiodarone, card plan to continue for couple of months and will f/u as outpt    [] New acute DVT of R distal brachia vein , New acute DVT of R external iliac, common femoral, femoral veins, R soleal vein   - IVC filter placed 1/9, Increase Eliquis to 5mg BID today    [] Thrombocytosis  - [id number] from 430 ; were previously in 800s; cont to trend.    -Heme consulted; likely reactive.     [] Dispo  -discharge to acute rehab once medically stable.

## 2020-02-08 NOTE — PROGRESS NOTE ADULT - ASSESSMENT
49 yr old man with ESRD s/p DDRT in 12/2019 complicated post transplant course now s/p graft nephrectomy, back on hemodialysis

## 2020-02-08 NOTE — PROGRESS NOTE ADULT - ASSESSMENT
49 year old male with ESRD on HD, now s/p ddrt on 12/8/2019 course complicated by TMA/profound ATN, who presents from a HD center with a fever and cough. He was initially admitted with influenza. He is s/p washout of infected collection and biopsy which revealed ATN.  He was found to have diffusely enlarging perinephric fluid collection.    While on telemetry, he had an episode of a wide complex tachycardia. It initially appears irregular, suggesting an atrial arrhythmia with aberrancy, though the remainder appears more like an episode of non-sustained VT.   s/p washout on 1/1/2020 with worsening sepsis and transfer to ICU, s/p IR drainage of perinephric collection on 1/6. 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 He underwent operative repair of right external iliac artery with pericardial patch, transplant nephrectomy, RLE thrombectomy, and IVC filter on 1/9.     s/p corrina ->tachy arrest with tdp, af rvr, now on amio, with large PE  He has now returned to the SICU with significant lue swelling and active arterial extravasation, with drop in blood counts. He is s/p angiogram with tiny pseudoaneurysm.    - remains in sr   - had developed malignant arrhythmias which seemed to start with bradycardia, leading to more tachycardia and bursts of VT and rapid AF.  The apparent af degenerated into torsades.  He was shocked and has been maintaining SR since. Arrhythmias were most likely triggered by the massive vte event, are unlikely to represent a primary cardiac issue  - cont amio at 200 daily. cont metoprolol   - repeat ekg daily to evaluate qtc, while on antifungals/amiodarone  - hold asa     - was on apixaban for his PE  - this has been held in the setting of significant arterial extravasation at his fistula.  - trend CBC. Transfuse to keep hb >8  - restart a/c when no contraindication  - echo with preserved/hyperdynamic lv, with RV failure consistent with acute RV overload.  Repeat echo noted, and seemingly with improved RV function  - remains with marked leg edema/vol ol. cont hd    - Further cardiac workup will depend on clinical course.  - will follow with you

## 2020-02-08 NOTE — PROGRESS NOTE ADULT - SUBJECTIVE AND OBJECTIVE BOX
Transplant Surgery - Multidisciplinary Rounds  --------------------------------------------------------------  R DDRT    Date:  12/8/19     Present:  Patient seen with multidisciplinary team including Transplant Surgeons: Dr. Be. Transplant Nephrologist: Dr. Tamayo. NP: Aislinn Hunt and MAEVE Arzola in am rounds and examined with Dr. Be. Disciplines not in attendance will be notified of the plan.     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 Young, followed by Dr. Raphael, [facility] Oncologist), RA with hand contractures. He underwent DDRT on 12/8/19. Post op course c/b DGF requiring HD, thrombocytopenia, elevated LDH and haptoglobin. Schistocytes  on peripheral smear concerning for TMA. Envarsus held. Received Belatacept 12/13. Started on PLEX (12/12, 12/15). Underwent renal bx on 12/13 c/w profound ATN.  Discharged home on 12/20/19 w/ outpatient HD.     Re-admitted 12/27 with influenza, completed treatement with Tamiflu on 12/31. Urine cx on admission grew Ent cloacae, was started on meropenem 12/31. Renal US on admission with increasing hematoma.   ·	OR 1/1 for ex-lap, hematoma evacuation, washout and renal bx. (c/w ATN)  OR cxs grew CRE and e coli. Post op course c/b:   ·	fevers and leukocytosis, CT a/p (1/4) with increased perinephric collection. Underwent IR guided collection 1/6   ·	New onset hematuria, martinez inserted 1/7, started CBI  ·	RLE edema, RLE doppler (1/8) with acute above the knee thrombus of R external iliac, common femoral and femoral veins, Acute calf vein thrombus affecting R soleal vein, Heparin gtt initiated   ·	AMS 1/9  (head CT neg), hyponatremic  ·	Significant hematuria and bleeding around the martinez. Angio 1/9 showed actively bleeding pseudoaneurysm at the anastomosis of the transplant renal artery with the recipient iliac artery. A stent was placed in the iliac artery to  control hemorrhage. IVC filter placed.   ·	Emergent OR 1/9-1/10 for graft nephrectomy;  ureter to bladder anastomosis was completely disrupted, repaired the R external iliac artery with bovine pericardial patch, repaired right external iliac vein, performed thrombectomy of RLE veins, Intra op received 3u PRBC.   ·	Extubated 1/11  ·	1/13 Saddle PE, with right heart strain and complicated by PEA arrest followed by Torsade, ROSC achieved after ACLS; re-intubated  ·	1/15 Renal US with 13.7 x 5.0 x 1.5 cm in RLQ transplant bed; s/p bedside IR guided drainage, pigtail in place. Fluid cx to date with NG  ·	1/21 CT C/A/P showing persistent collection, catheter at superior portion of collection  ·	1/22 IR pigtail drain repositioned and upsized to 12Fr catheter w/ increased output  ·	1/23 Transitioned to Eliquis from Heparin drip  ·	2/1  AVF infiltrated during HD w/ - arterial extrav/ bleeding to the left upper arm and chest noted on CTA   ·	2/3 Left upper cavity arteriography demonstrated a tiny pseudoaneurysm arising from a distal branch of the left deep brachial artery. The tiny feeder artery was dissected resulting in resolution of the pseudoaneurysm    Interval events:    - H/H stable. 8.1/25.7    - Restarted Eliquis 2.5mg BID  - LUE edema with tenderness under axilla along rib cage, across left chest. Notable swelling  noted posterior to left axilla, tender to touch   - Anuric. HD done via R shiley  - Still with 2+ LE edema  - TMax 37.1    WBC 16   On meropenem and micafungin.       cxs:   12/29: Urine Cx: Enterobacter Cloacae  1/1 OR Perinephric collection Cx:  Carbapenem resistant Ent Cloacae, E. Coli  1/1: R kidney abscess swab: Enterobacter Cloacae  1/1  OR tissue: Enterobacter Cloacae  1/5: Urine Cx: Enteropbacter Cloacae  1/5: Skin incision Cx (bedside): NG  1/6: Adominal fluid Cx from IR:  NG   1/7: Urine Cx:  NG  1/7: BCX2:  NG  1/10 retroperit hematoma - enerobacter   1/10 bladder hematoma - enterobacter  1/15 Drainage fluid - NGTD  1/16: BC X2: NGTD  1/18: SPutum: Normal harvey  1/21: BC X2: NTGD  1/22: IR body fluid Cx: ngtd    Potential Discharge date: pending clinical improvement       MEDICATIONS  (STANDING):  allopurinol 100 milliGRAM(s) Oral daily  aMIOdarone    Tablet 200 milliGRAM(s) Oral daily  apixaban 2.5 milliGRAM(s) Oral every 12 hours  brimonidine 0.2% Ophthalmic Solution 1 Drop(s) Both EYES three times a day  chlorhexidine 2% Cloths 1 Application(s) Topical <User Schedule>  epoetin trey Injectable 43434 Unit(s) IV Push <User Schedule>  famotidine    Tablet 20 milliGRAM(s) Oral daily  gabapentin 100 milliGRAM(s) Oral every 12 hours  latanoprost 0.005% Ophthalmic Solution 1 Drop(s) Both EYES daily  meropenem  IVPB 500 milliGRAM(s) IV Intermittent every 24 hours  metoprolol tartrate 25 milliGRAM(s) Oral every 12 hours  micafungin IVPB 100 milliGRAM(s) IV Intermittent every 24 hours  Nephro-candace 1 Tablet(s) Oral daily  polyethylene glycol 3350 17 Gram(s) Oral daily  senna 2 Tablet(s) Oral at bedtime    MEDICATIONS  (PRN):  acetaminophen   Tablet .. 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  Carpal tunnel syndrome  Brain cyst: had MRI recently- now gone  Anemia  Gastric ulcer: Long time ago  Rheumatoid arthritis  Renal transplant recipient  Breakdown (mechanical) of penile (implanted) prosthesis, sequela  End-stage renal disease (ESRD): PERMACATH PLACEMENT  Abscess of left buttock: s/p I &amp; D  AV fistula: placement left lower arm  S/P cystoscopy: stent placement &amp; removal for kidney stones, lithiotripsy  s/p Lt Carpal tunnel: 2011      Vital Signs Last 24 Hrs  T(C): 37.1 (08 Feb 2020 09:00), Max: 37.1 (08 Feb 2020 05:11)  T(F): 98.8 (08 Feb 2020 09:00), Max: 98.8 (08 Feb 2020 09:00)  HR: 76 (08 Feb 2020 09:00) (64 - 80)  BP: 168/81 (08 Feb 2020 09:00) (131/67 - 168/81)  BP(mean): 117 (08 Feb 2020 09:00) (92 - 117)  RR: 18 (08 Feb 2020 09:00) (16 - 18)  SpO2: 94% (08 Feb 2020 09:00) (94% - 100%)    I&O's Summary    07 Feb 2020 07:01  -  08 Feb 2020 07:00  --------------------------------------------------------  IN: 390 mL / OUT: 100 mL / NET: 290 mL    08 Feb 2020 07:01  -  08 Feb 2020 11:42  --------------------------------------------------------  IN: 120 mL / OUT: 0 mL / NET: 120 mL                              8.1    16.64 )-----------( 615      ( 08 Feb 2020 08:26 )             25.7     02-08    136  |  95<L>  |  28<H>  ----------------------------<  92  4.5   |  25  |  5.75<H>    Ca    8.9      08 Feb 2020 08:26  Phos  3.6     02-08  Mg     2.1     02-08    TPro  6.5  /  Alb  2.6<L>  /  TBili  1.0  /  DBili  0.4<H>  /  AST  19  /  ALT  <5<L>  /  AlkPhos  228<H>  02-08      REVIEW OF SYSTEMS  Gen: + weakness   Skin: No rashes  Head/Eyes/Ears/Mouth: No headache; Normal hearing; Normal vision w/o blurriness; No sinus pain/discomfort, sore throat  Respiratory: no SOB  CV: No chest pain, PND, orthopnea  GI: incisional tenderness   : anuric  MSK: c/o LUE pain and tenderness but reports improvement.   Neuro: No dizziness/lightheadedness, weakness, seizures, numbness, tingling  Heme: No easy bruising or bleeding  Endo: No heat/cold intolerance  Psych: No significant nervousness, anxiety, stress, depression      PHYSICAL EXAM:    Constitutional: NAD  	Eyes: Anicteric, PERRLA  	Respiratory: CTA b/l  	Cardiovascular: RRR  	Gastrointestinal: Soft abdomen, appropriate incisional TTP, ND. Wound vac ss/murky output    Genitourinary:  anuric, large amount scrotal edema     Extremities: LUE and left chest edema, improved.  Notable swelling posterior to left axilla, + TTP.  Superficial skin tear on LUE.  2+ BLE edema with TEDS stockings to knee and ace wrap above knee. + flank edema.   	Vascular: DP signal bilateral, L AVF palpable  	Neurological: A&O x3.  	Skin: no new lesions/ulcerations  	Musculoskeletal: Moving all extremities

## 2020-02-08 NOTE — PROGRESS NOTE ADULT - PROBLEM SELECTOR PLAN 2
S/p DVT and PE, Eliquis was on hold due to AVF infiltration and hematoma, Resumed lower dose yesterday.   Increase Eliquis to 5mg BID full dose in view of extensive DVTs

## 2020-02-08 NOTE — PROGRESS NOTE ADULT - SUBJECTIVE AND OBJECTIVE BOX
[facility] DIVISION OF KIDNEY DISEASES AND HYPERTENSION -- FOLLOW UP NOTE  --------------------------------------------------------------------------------  49 yr old man with ESRD s/p DDRT in Dec 2019 complicated by DGF due to ATN and early TMA thought to be due to prograf. Was on belatacept/MMF/Pred immunosuppression.   He was initially  admitted 6 weeks ago with influenza, course complicated by perinephric hematoma complicated by enterobacter infection requiring surgical exploration and wash out. Post op course complicated by DVT started on heparin drip, subsequent gross hematuria. He was taken to the OR and found to have dehiscence of the ureter-bladder anastomosis as well as vascular anastomosis. He underwent graft nephrectomy on 1/10/20 with bovine bio patch repair of the EIA. On 1/13/20 he had cardiac arrest due to large pulmonary embolism s/p successful resuscitation.   On 2/1 he had infiltration of his AV fistula with large hematoma.     24 hour events/subjective:  No major events overnight. He has no new complaints.         Standing Inpatient Medications  allopurinol 100 milliGRAM(s) Oral daily  aMIOdarone    Tablet 200 milliGRAM(s) Oral daily  apixaban 2.5 milliGRAM(s) Oral every 12 hours  brimonidine 0.2% Ophthalmic Solution 1 Drop(s) Both EYES three times a day  chlorhexidine 2% Cloths 1 Application(s) Topical <User Schedule>  epoetin trey Injectable 14646 Unit(s) IV Push <User Schedule>  famotidine    Tablet 20 milliGRAM(s) Oral daily  gabapentin 100 milliGRAM(s) Oral every 12 hours  latanoprost 0.005% Ophthalmic Solution 1 Drop(s) Both EYES daily  meropenem  IVPB 500 milliGRAM(s) IV Intermittent every 24 hours  metoprolol tartrate 25 milliGRAM(s) Oral every 12 hours  micafungin IVPB 100 milliGRAM(s) IV Intermittent every 24 hours  Nephro-candace 1 Tablet(s) Oral daily  polyethylene glycol 3350 17 Gram(s) Oral daily  senna 2 Tablet(s) Oral at bedtime    PRN Inpatient Medications  acetaminophen   Tablet .. 975 milliGRAM(s) Oral every 6 hours PRN  HYDROmorphone  Injectable 0.5 milliGRAM(s) IV Push every 6 hours PRN  oxyCODONE    IR 10 milliGRAM(s) Oral every 6 hours PRN  oxyCODONE    IR 5 milliGRAM(s) Oral every 4 hours PRN        VITALS/PHYSICAL EXAM  --------------------------------------------------------------------------------  T(C): 37.1 (02-08-20 @ 09:00), Max: 37.1 (02-08-20 @ 05:11)  HR: 76 (02-08-20 @ 09:00) (64 - 80)  BP: 168/81 (02-08-20 @ 09:00) (131/67 - 168/81)  RR: 18 (02-08-20 @ 09:00) (16 - 18)  SpO2: 94% (02-08-20 @ 09:00) (94% - 100%)  Wt(kg): --        02-07-20 @ 07:01  -  02-08-20 @ 07:00  --------------------------------------------------------  IN: 390 mL / OUT: 100 mL / NET: 290 mL    02-08-20 @ 07:01  -  02-08-20 @ 11:49  --------------------------------------------------------  IN: 120 mL / OUT: 0 mL / NET: 120 mL      Physical Exam:  	Gen: NAD, sitting comfortably in chair   	HEENT: PERRL  	Pulm: CTA B/L  	CV: RRR, S1S2; no rub  	Abd: +BS, soft, nontender/nondistended              Bilateral LE and LUE edema - wrapped with ace bandage               Left posterior shoulder 10cm mass ? hematoma               Right IJ dialysis catheter   	Neuro: No focal deficits  	Psych: Normal affect and mood        LABS/STUDIES  --------------------------------------------------------------------------------              8.1    16.64 >-----------<  615      [02-08-20 @ 08:26]              25.7     136  |  95  |  28  ----------------------------<  92      [02-08-20 @ 08:26]  4.5   |  25  |  5.75        Ca     8.9     [02-08-20 @ 08:26]      Mg     2.1     [02-08-20 @ 08:26]      Phos  3.6     [02-08-20 @ 08:26]    TPro  6.5  /  Alb  2.6  /  TBili  1.0  /  DBili  0.4  /  AST  19  /  ALT  <5  /  AlkPhos  228  [02-08-20 @ 08:26]    PT/INR: PT 14.8 , INR 1.28       [02-08-20 @ 08:26]  PTT: 37.5       [02-08-20 @ 08:26]      Creatinine Trend:  SCr 5.75 [02-08 @ 08:26]  SCr 4.33 [02-06 @ 22:42]  SCr 5.70 [02-06 @ 00:39]  SCr 4.82 [02-05 @ 05:57]  SCr 2.90 [02-05 @ 04:21]              Iron 23, TIBC 189, %sat 12      [01-27-20 @ 14:03]  HbA1c 5.2      [01-13-20 @ 22:59]    HBsAb 351.0      [01-11-20 @ 16:09]  HBsAg Nonreact      [01-11-20 @ 16:09]  HBcAb Nonreact      [01-11-20 @ 16:09]  HCV 0.09, Nonreact      [01-11-20 @ 16:09]

## 2020-02-08 NOTE — PROGRESS NOTE ADULT - ASSESSMENT
Assessment	  48 y/o male presenting with influenza A with a hospital course complicated by Infected transplant perinephric hematoma s/p right groin exploration, washout, & biopsy c/b persistent perinephric collection s/p FNA, Right external iliac & common femoral DVTs s/p IVC filter & thrombectomy of the RLE veins, Gross hematuria w/ pseudoaneurysm of the right external iliac & transplant renal artery seen on IR angiogram s/p right groin exploration, washout of the RP space, and transplant nephrectomy w/ repair of the right external iliac artery w/ a bovine patch, Iliac fossa collection s/p IR drainage, Cardiac arrest secondary to a saddle PE, now with L infiltration with likely small amount of ongoing active bleeding with large LUE hematoma while patient was on therapeutic anticoagulation.    Plan:  - Pain control  - Trend H&H  - continue with compression and elevation  - continue to monitor neurovascular exam- no neurovascular deficits noted at this time   - continue excellent care per transplant    VASCULAR SURGERY [id number] Assessment	  50 y/o male presenting with influenza A with a hospital course complicated by Infected transplant perinephric hematoma s/p right groin exploration, washout, & biopsy c/b persistent perinephric collection s/p FNA, Right external iliac & common femoral DVTs s/p IVC filter & thrombectomy of the RLE veins, Gross hematuria w/ pseudoaneurysm of the right external iliac & transplant renal artery seen on IR angiogram s/p right groin exploration, washout of the RP space, and transplant nephrectomy w/ repair of the right external iliac artery w/ a bovine patch, Iliac fossa collection s/p IR drainage, Cardiac arrest secondary to a saddle PE, now with L infiltration with likely small amount of ongoing active bleeding with large LUE hematoma while patient was on therapeutic anticoagulation.    Plan:  - Pain control  - Trend H&H  - Evaluate AVF function on next HD session - patient refused to be evaluated with HD today  - continue with compression and elevation  - continue to monitor neurovascular exam- no neurovascular deficits noted at this time   - continue excellent care per transplant    VASCULAR SURGERY x90

## 2020-02-08 NOTE — PROGRESS NOTE ADULT - SUBJECTIVE AND OBJECTIVE BOX
VASCULAR SURGERY DAILY PROGRESS NOTE:    Subjective:  Patient seen and examined on AM rounds. No acute event overnight. No active complaint    Vital Signs Last 24 Hrs  T(C): 37.1 (2020 09:00), Max: 37.1 (2020 05:11)  T(F): 98.8 (2020 09:00), Max: 98.8 (2020 09:00)  HR: 76 (2020 09:00) (64 - 80)  BP: 168/81 (2020 09:00) (131/67 - 168/81)  BP(mean): 117 (2020 09:00) (92 - 117)  RR: 18 (2020 09:00) (14 - 18)  SpO2: 94% (2020 09:00) (94% - 100%)    Physical Exam  Gen: resting in bed, NAD  Card: RRR, normotensive  Chest/Back: hematoma stable  Respiratory: breathing comfortably on RA  Ext: Left arm wrapped in ACE; palpable L radial pulse, +thrill, full ROM, neurovascular grossly intact; left upper arm wound with ss, covered with adaptic and wrapped in ACE; +pedal pulses    I&O's Detail    2020 07:01  -  2020 07:00  --------------------------------------------------------  IN:    Oral Fluid: 390 mL  Total IN: 390 mL    OUT:    Voided: 100 mL  Total OUT: 100 mL    Total NET: 290 mL      2020 07:01  -  2020 10:17  --------------------------------------------------------  IN:    Oral Fluid: 120 mL  Total IN: 120 mL    OUT:  Total OUT: 0 mL    Total NET: 120 mL          Daily     Daily Weight in k.7 (2020 05:11)    MEDICATIONS  (STANDING):  allopurinol 100 milliGRAM(s) Oral daily  aMIOdarone    Tablet 200 milliGRAM(s) Oral daily  apixaban 2.5 milliGRAM(s) Oral every 12 hours  brimonidine 0.2% Ophthalmic Solution 1 Drop(s) Both EYES three times a day  chlorhexidine 2% Cloths 1 Application(s) Topical <User Schedule>  epoetin trey Injectable 82906 Unit(s) IV Push <User Schedule>  famotidine    Tablet 20 milliGRAM(s) Oral daily  gabapentin 100 milliGRAM(s) Oral every 12 hours  latanoprost 0.005% Ophthalmic Solution 1 Drop(s) Both EYES daily  meropenem  IVPB 500 milliGRAM(s) IV Intermittent every 24 hours  metoprolol tartrate 25 milliGRAM(s) Oral every 12 hours  micafungin IVPB 100 milliGRAM(s) IV Intermittent every 24 hours  Nephro-candace 1 Tablet(s) Oral daily  polyethylene glycol 3350 17 Gram(s) Oral daily  senna 2 Tablet(s) Oral at bedtime    MEDICATIONS  (PRN):  acetaminophen   Tablet .. 975 milliGRAM(s) Oral every 6 hours PRN Mild Pain (1 - 3)  HYDROmorphone  Injectable 0.5 milliGRAM(s) IV Push every 6 hours PRN Breakthrough Pain  oxyCODONE    IR 10 milliGRAM(s) Oral every 6 hours PRN Severe Pain (7 - 10)  oxyCODONE    IR 5 milliGRAM(s) Oral every 4 hours PRN Moderate Pain (4 - 6)      LABS:                        8.1    16.64 )-----------( 615      ( 2020 08:26 )             25.7     02-08    136  |  95<L>  |  28<H>  ----------------------------<  92  4.5   |  25  |  5.75<H>    Ca    8.9      2020 08:26  Phos  3.6     02-08  Mg     2.1     02-08    TPro  6.5  /  Alb  2.6<L>  /  TBili  1.0  /  DBili  0.4<H>  /  AST  19  /  ALT  <5<L>  /  AlkPhos  228<H>  02-08    PT/INR - ( 2020 08:26 )   PT: 14.8 sec;   INR: 1.28 ratio         PTT - ( 2020 08:26 )  PTT:37.5 sec VASCULAR SURGERY DAILY PROGRESS NOTE:    Subjective:  Patient seen and examined on AM rounds. No acute event overnight. No active complaint    Vital Signs Last 24 Hrs  T(C): 37.1 (2020 09:00), Max: 37.1 (2020 05:11)  T(F): 98.8 (2020 09:00), Max: 98.8 (2020 09:00)  HR: 76 (2020 09:00) (64 - 80)  BP: 168/81 (2020 09:00) (131/67 - 168/81)  BP(mean): 117 (2020 09:00) (92 - 117)  RR: 18 (2020 09:00) (14 - 18)  SpO2: 94% (2020 09:00) (94% - 100%)    Physical Exam  Gen: resting in bed, NAD  Card: RRR, normotensive  Chest/Back: hematoma stable  Respiratory: breathing comfortably on RA  Ext: Left arm wrapped in ACE; palpable L radial pulse, +thrill, full ROM, neurovascular grossly intact; left upper arm wound with ss, covered with adaptic and wrapped in ACE; +pedal pulses    I&O's Detail    2020 07:01  -  2020 07:00  --------------------------------------------------------  IN:    Oral Fluid: 390 mL  Total IN: 390 mL    OUT:    Voided: 100 mL  Total OUT: 100 mL    Total NET: 290 mL      2020 07:01  -  2020 10:17  --------------------------------------------------------  IN:    Oral Fluid: 120 mL  Total IN: 120 mL    OUT:  Total OUT: 0 mL    Total NET: 120 mL          Daily     Daily Weight in k.7 (2020 05:11)    MEDICATIONS  (STANDING):  allopurinol 100 milliGRAM(s) Oral daily  aMIOdarone    Tablet 200 milliGRAM(s) Oral daily  apixaban 2.5 milliGRAM(s) Oral every 12 hours  brimonidine 0.2% Ophthalmic Solution 1 Drop(s) Both EYES three times a day  chlorhexidine 2% Cloths 1 Application(s) Topical <User Schedule>  epoetin trey Injectable 84697 Unit(s) IV Push <User Schedule>  famotidine    Tablet 20 milliGRAM(s) Oral daily  gabapentin 100 milliGRAM(s) Oral every 12 hours  latanoprost 0.005% Ophthalmic Solution 1 Drop(s) Both EYES daily  meropenem  IVPB 500 milliGRAM(s) IV Intermittent every 24 hours  metoprolol tartrate 25 milliGRAM(s) Oral every 12 hours  micafungin IVPB 100 milliGRAM(s) IV Intermittent every 24 hours  Nephro-candace 1 Tablet(s) Oral daily  polyethylene glycol 3350 17 Gram(s) Oral daily  senna 2 Tablet(s) Oral at bedtime    MEDICATIONS  (PRN):  acetaminophen   Tablet .. 975 milliGRAM(s) Oral every 6 hours PRN Mild Pain (1 - 3)  HYDROmorphone  Injectable 0.5 milliGRAM(s) IV Push every 6 hours PRN Breakthrough Pain  oxyCODONE    IR 10 milliGRAM(s) Oral every 6 hours PRN Severe Pain (7 - 10)  oxyCODONE    IR 5 milliGRAM(s) Oral every 4 hours PRN Moderate Pain (4 - 6)      LABS:                        8.1    16.64 )-----------( 615      ( 2020 08:26 )             25.7     02-08    136  |  95<L>  |  28<H>  ----------------------------<  92  4.5   |  25  |  5.75<H>    Ca    8.9      2020 08:26  Phos  3.6     02-08  Mg     2.1     02-08    TPro  6.5  /  Alb  2.6<L>  /  TBili  1.0  /  DBili  0.4<H>  /  AST  19  /  ALT  <5<L>  /  AlkPhos  228<H>  02-08    PT/INR - ( 2020 08:26 )   PT: 14.8 sec;   INR: 1.28 ratio         PTT - ( 2020 08:26 )  PTT:37.5 sec

## 2020-02-08 NOTE — PROGRESS NOTE ADULT - SUBJECTIVE AND OBJECTIVE BOX
[facility]    Melissa Kelsey, Enrique, Maximino, Marin, Hamzah, Ravi      [phone]    CHIEF COMPLAINT: Patient is a 49y old  Male who presents with a chief complaint of Fever (07 Feb 2020 17:23)      Follow Up: tdp/arrest, af, vol ol    Interim history: The patient reports no new symptoms.  Denies chest discomfort and shortness of breath.  No abdominal pain.  No new neurologic symptoms.      MEDICATIONS  (STANDING):  allopurinol 100 milliGRAM(s) Oral daily  aMIOdarone    Tablet 200 milliGRAM(s) Oral daily  apixaban 2.5 milliGRAM(s) Oral every 12 hours  brimonidine 0.2% Ophthalmic Solution 1 Drop(s) Both EYES three times a day  chlorhexidine 2% Cloths 1 Application(s) Topical <User Schedule>  epoetin trey Injectable 86100 Unit(s) IV Push <User Schedule>  famotidine    Tablet 20 milliGRAM(s) Oral daily  gabapentin 100 milliGRAM(s) Oral every 12 hours  latanoprost 0.005% Ophthalmic Solution 1 Drop(s) Both EYES daily  meropenem  IVPB 500 milliGRAM(s) IV Intermittent every 24 hours  metoprolol tartrate 25 milliGRAM(s) Oral every 12 hours  micafungin IVPB 100 milliGRAM(s) IV Intermittent every 24 hours  Nephro-candace 1 Tablet(s) Oral daily  polyethylene glycol 3350 17 Gram(s) Oral daily  senna 2 Tablet(s) Oral at bedtime    MEDICATIONS  (PRN):  acetaminophen   Tablet .. 975 milliGRAM(s) Oral every 6 hours PRN Mild Pain (1 - 3)  HYDROmorphone  Injectable 0.5 milliGRAM(s) IV Push every 6 hours PRN Breakthrough Pain  oxyCODONE    IR 10 milliGRAM(s) Oral every 6 hours PRN Severe Pain (7 - 10)  oxyCODONE    IR 5 milliGRAM(s) Oral every 4 hours PRN Moderate Pain (4 - 6)      REVIEW OF SYSTEMS:  eye, ent, GI, , allergic, dermatologic, musculoskeletal and neurologic are negative except as described above    Vital Signs Last 24 Hrs  T(C): 37.1 (08 Feb 2020 09:00), Max: 37.1 (08 Feb 2020 05:11)  T(F): 98.8 (08 Feb 2020 09:00), Max: 98.8 (08 Feb 2020 09:00)  HR: 76 (08 Feb 2020 09:00) (64 - 80)  BP: 168/81 (08 Feb 2020 09:00) (131/67 - 168/81)  BP(mean): 117 (08 Feb 2020 09:00) (92 - 117)  RR: 18 (08 Feb 2020 09:00) (14 - 18)  SpO2: 94% (08 Feb 2020 09:00) (94% - 100%)    I&O's Summary    07 Feb 2020 07:01  -  08 Feb 2020 07:00  --------------------------------------------------------  IN: 390 mL / OUT: 100 mL / NET: 290 mL    08 Feb 2020 07:01  -  08 Feb 2020 10:07  --------------------------------------------------------  IN: 120 mL / OUT: 0 mL / NET: 120 mL        Telemetry past 24h: sr pvcs    PHYSICAL EXAM:    Constitutional: well-nourished, well-developed, NAD   HEENT:  MMM, sclerae anicteric, conjunctivae clear, no oral cyanosis.  Pulmonary: Non-labored, breath sounds are clear ant on limited exam, No wheezing, rales or rhonchi  Cardiovascular: Regular, S1 and S2.  No murmur.  No rubs, gallops or clicks  Gastrointestinal: Bowel Sounds present, soft, nontender.   Lymph: +++ peripheral edema.   Neurological: Alert, no focal deficits  Skin: No rashes.  Psych:  Mood & affect appropriate    LABS: All Labs Reviewed:                        8.1    16.64 )-----------( 615      ( 08 Feb 2020 08:26 )             25.7                         8.0    15.77 )-----------( 430      ( 06 Feb 2020 22:42 )             25.0                         8.1    15.83 )-----------( 419      ( 06 Feb 2020 12:14 )             24.4     08 Feb 2020 08:26    136    |  95     |  28     ----------------------------<  92     4.5     |  25     |  5.75   06 Feb 2020 22:42    137    |  96     |  18     ----------------------------<  104    3.9     |  26     |  4.33   06 Feb 2020 00:39    134    |  99     |  28     ----------------------------<  119    4.8     |  24     |  5.70     Ca    8.9        08 Feb 2020 08:26  Ca    8.4        06 Feb 2020 22:42  Ca    8.1        06 Feb 2020 00:39  Phos  3.6       08 Feb 2020 08:26  Phos  3.3       06 Feb 2020 22:42  Phos  4.8       06 Feb 2020 00:39  Mg     2.1       08 Feb 2020 08:26  Mg     2.0       06 Feb 2020 22:42  Mg     2.1       06 Feb 2020 00:39    TPro  6.5    /  Alb  2.6    /  TBili  1.0    /  DBili  0.4    /  AST  19     /  ALT  <5     /  AlkPhos  228    08 Feb 2020 08:26  TPro  5.9    /  Alb  2.1    /  TBili  0.8    /  DBili  0.4    /  AST  18     /  ALT  <5     /  AlkPhos  197    06 Feb 2020 22:42  TPro  5.3    /  Alb  2.0    /  TBili  0.7    /  DBili  0.3    /  AST  19     /  ALT  6      /  AlkPhos  193    06 Feb 2020 00:39    PT/INR - ( 08 Feb 2020 08:26 )   PT: 14.8 sec;   INR: 1.28 ratio         PTT - ( 08 Feb 2020 08:26 )  PTT:37.5 sec      Blood Culture:         RADIOLOGY:    EKG:    Echo:

## 2020-02-08 NOTE — PROGRESS NOTE ADULT - ATTENDING COMMENTS
appears to be improving.     has swelling around left shoulder, suspect resolving hematoma.  will get u/s    HD today    increase to therapeutic Eloquis.

## 2020-02-08 NOTE — PROGRESS NOTE ADULT - ASSESSMENT
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, DGF  S/p transplant kidney bx 12/13: ATN, focal TMA  S/p PLEX 12/13, 12/15; d/c-d 12/20 w/op HD 3 x wk  Re-adm 12/27 w/Flu A, infected carlos-nephric hematoma   S/p OR 1/1 for washout and repeat renal graft biopsy (ATN)  Dx w/RLE DVT  Developed gross hematuria  S/p OR 1/10 for infected carlos-nephric hematoma, hemorrhage involving pseudo-aneurysm of anastomosis of renal artery to external iliac vein,   s/p transplant nephrectomy, s/p IVCF   S/p PEA arrest 1/13, + PE, R retroperitoneal collection  S/p IR drainage of abd collection  RUE DVT, AC  Abx per ID  AVF infiltration 2/1 w/hematoma extending to L chest wall while on Eliquis, s/p PRBCs  Stable HD today via temp HD catheter  Hgb stable  Maintain on TIW HD schedule

## 2020-02-08 NOTE — PROGRESS NOTE ADULT - SUBJECTIVE AND OBJECTIVE BOX
Patient seen in follow up for ESRD. Had HD earlier via right I.J. cuffless HD cathter. No SOB, has good appetite.      MEDICATIONS  (STANDING):  allopurinol 100 milliGRAM(s) Oral daily  aMIOdarone    Tablet 200 milliGRAM(s) Oral daily  apixaban 2.5 milliGRAM(s) Oral every 12 hours  brimonidine 0.2% Ophthalmic Solution 1 Drop(s) Both EYES three times a day  chlorhexidine 2% Cloths 1 Application(s) Topical <User Schedule>  epoetin trey Injectable 13180 Unit(s) IV Push <User Schedule>  famotidine    Tablet 20 milliGRAM(s) Oral daily  gabapentin 100 milliGRAM(s) Oral every 12 hours  latanoprost 0.005% Ophthalmic Solution 1 Drop(s) Both EYES daily  meropenem  IVPB 500 milliGRAM(s) IV Intermittent every 24 hours  metoprolol tartrate 25 milliGRAM(s) Oral every 12 hours  micafungin IVPB 100 milliGRAM(s) IV Intermittent every 24 hours  Nephro-candace 1 Tablet(s) Oral daily  polyethylene glycol 3350 17 Gram(s) Oral daily  senna 2 Tablet(s) Oral at bedtime    MEDICATIONS  (PRN):  acetaminophen   Tablet .. 975 milliGRAM(s) Oral every 6 hours PRN Mild Pain (1 - 3)  HYDROmorphone  Injectable 0.5 milliGRAM(s) IV Push every 6 hours PRN Breakthrough Pain  oxyCODONE    IR 10 milliGRAM(s) Oral every 6 hours PRN Severe Pain (7 - 10)  oxyCODONE    IR 5 milliGRAM(s) Oral every 4 hours PRN Moderate Pain (4 - 6)    T(C): 36.9 (02-08-20 @ 17:00), Max: 37.1 (02-08-20 @ 05:11)  HR: 65 (02-08-20 @ 17:00) (64 - 80)  BP: 133/69 (02-08-20 @ 17:00) (121/60 - 168/81)  RR: 18 (02-08-20 @ 17:00) (14 - 29)  SpO2: 99% (02-08-20 @ 17:00) (94% - 100%)  Wt(kg): --  I&O's Detail    07 Feb 2020 07:01  -  08 Feb 2020 07:00  --------------------------------------------------------  IN:    Oral Fluid: 390 mL  Total IN: 390 mL    OUT:    Voided: 100 mL  Total OUT: 100 mL    Total NET: 290 mL      08 Feb 2020 07:01  -  08 Feb 2020 19:37  --------------------------------------------------------  IN:    Oral Fluid: 440 mL    Other: 900 mL    Solution: 100 mL    Solution: 50 mL  Total IN: 1490 mL    OUT:    Other: 2900 mL  Total OUT: 2900 mL    Total NET: -1410 mL        PHYSICAL EXAM:  AAO x3  Right I.J. HD cath dressed  Respiratory: decreased BS at bases  Cardiovascular: S1 S2  Gastrointestinal: soft NT ND +BS  Extremities:  LUE wrapped ; b/l LE edema     CBC Full  -  ( 08 Feb 2020 08:26 )  WBC Count : 16.64 K/uL  RBC Count : 2.68 M/uL  Hemoglobin : 8.1 g/dL  Hematocrit : 25.7 %  Platelet Count - Automated : 615 K/uL  Mean Cell Volume : 95.9 fl  Mean Cell Hemoglobin : 30.2 pg  Mean Cell Hemoglobin Concentration : 31.5 gm/dL  Auto Neutrophil # : x  Auto Lymphocyte # : x  Auto Monocyte # : x  Auto Eosinophil # : x  Auto Basophil # : x  Auto Neutrophil % : x  Auto Lymphocyte % : x  Auto Monocyte % : x  Auto Eosinophil % : x  Auto Basophil % : x    02-08    136  |  95<L>  |  28<H>  ----------------------------<  92  4.5   |  25  |  5.75<H>    Ca    8.9      08 Feb 2020 08:26  Phos  3.6     02-08  Mg     2.1     02-08    TPro  6.5  /  Alb  2.6<L>  /  TBili  1.0  /  DBili  0.4<H>  /  AST  19  /  ALT  <5<L>  /  AlkPhos  228<H>  02-08        Sodium, Serum: 136 (02-08 @ 08:26)  Sodium, Serum: 137 (02-06 @ 22:42)  Sodium, Serum: 134 (02-06 @ 00:39)    Creatinine, Serum: 5.75 (02-08 @ 08:26)  Creatinine, Serum: 4.33 (02-06 @ 22:42)  Creatinine, Serum: 5.70 (02-06 @ 00:39)    Potassium, Serum: 4.5 (02-08 @ 08:26)  Potassium, Serum: 3.9 (02-06 @ 22:42)  Potassium, Serum: 4.8 (02-06 @ 00:39)    Hemoglobin: 8.1 (02-08 @ 08:26)  Hemoglobin: 8.0 (02-06 @ 22:42)  Hemoglobin: 8.1 (02-06 @ 12:14)  Hemoglobin: 7.8 (02-06 @ 00:39)

## 2020-02-09 LAB
ALBUMIN SERPL ELPH-MCNC: 2.5 G/DL — LOW (ref 3.3–5)
ALP SERPL-CCNC: 219 U/L — HIGH (ref 40–120)
ALT FLD-CCNC: <5 U/L — LOW (ref 10–45)
ANION GAP SERPL CALC-SCNC: 13 MMOL/L — SIGNIFICANT CHANGE UP (ref 5–17)
APTT BLD: 37.2 SEC — HIGH (ref 27.5–36.3)
AST SERPL-CCNC: 21 U/L — SIGNIFICANT CHANGE UP (ref 10–40)
BILIRUB DIRECT SERPL-MCNC: 0.5 MG/DL — HIGH (ref 0–0.2)
BILIRUB INDIRECT FLD-MCNC: 0.7 MG/DL — SIGNIFICANT CHANGE UP (ref 0.2–1)
BILIRUB SERPL-MCNC: 1.2 MG/DL — SIGNIFICANT CHANGE UP (ref 0.2–1.2)
BUN SERPL-MCNC: 19 MG/DL — SIGNIFICANT CHANGE UP (ref 7–23)
CALCIUM SERPL-MCNC: 8.8 MG/DL — SIGNIFICANT CHANGE UP (ref 8.4–10.5)
CHLORIDE SERPL-SCNC: 95 MMOL/L — LOW (ref 96–108)
CO2 SERPL-SCNC: 27 MMOL/L — SIGNIFICANT CHANGE UP (ref 22–31)
CREAT SERPL-MCNC: 4.49 MG/DL — HIGH (ref 0.5–1.3)
GLUCOSE SERPL-MCNC: 95 MG/DL — SIGNIFICANT CHANGE UP (ref 70–99)
HCT VFR BLD CALC: 25.9 % — LOW (ref 39–50)
HGB BLD-MCNC: 8 G/DL — LOW (ref 13–17)
INR BLD: 1.34 RATIO — HIGH (ref 0.88–1.16)
MAGNESIUM SERPL-MCNC: 2.1 MG/DL — SIGNIFICANT CHANGE UP (ref 1.6–2.6)
MCHC RBC-ENTMCNC: 29.5 PG — SIGNIFICANT CHANGE UP (ref 27–34)
MCHC RBC-ENTMCNC: 30.9 GM/DL — LOW (ref 32–36)
MCV RBC AUTO: 95.6 FL — SIGNIFICANT CHANGE UP (ref 80–100)
NRBC # BLD: 0 /100 WBCS — SIGNIFICANT CHANGE UP (ref 0–0)
PHOSPHATE SERPL-MCNC: 3.2 MG/DL — SIGNIFICANT CHANGE UP (ref 2.5–4.5)
PLATELET # BLD AUTO: 653 K/UL — HIGH (ref 150–400)
POTASSIUM SERPL-MCNC: 4 MMOL/L — SIGNIFICANT CHANGE UP (ref 3.5–5.3)
POTASSIUM SERPL-SCNC: 4 MMOL/L — SIGNIFICANT CHANGE UP (ref 3.5–5.3)
PROCALCITONIN SERPL-MCNC: 1.02 NG/ML — HIGH (ref 0.02–0.1)
PROT SERPL-MCNC: 6.1 G/DL — SIGNIFICANT CHANGE UP (ref 6–8.3)
PROTHROM AB SERPL-ACNC: 15.5 SEC — HIGH (ref 10–12.9)
RBC # BLD: 2.71 M/UL — LOW (ref 4.2–5.8)
RBC # FLD: 15.7 % — HIGH (ref 10.3–14.5)
SODIUM SERPL-SCNC: 135 MMOL/L — SIGNIFICANT CHANGE UP (ref 135–145)
WBC # BLD: 15.99 K/UL — HIGH (ref 3.8–10.5)
WBC # FLD AUTO: 15.99 K/UL — HIGH (ref 3.8–10.5)

## 2020-02-09 PROCEDURE — 99232 SBSQ HOSP IP/OBS MODERATE 35: CPT | Mod: GC

## 2020-02-09 PROCEDURE — 99232 SBSQ HOSP IP/OBS MODERATE 35: CPT

## 2020-02-09 PROCEDURE — 99231 SBSQ HOSP IP/OBS SF/LOW 25: CPT

## 2020-02-09 RX ORDER — MICAFUNGIN SODIUM 100 MG/1
100 INJECTION, POWDER, LYOPHILIZED, FOR SOLUTION INTRAVENOUS ONCE
Refills: 0 | Status: COMPLETED | OUTPATIENT
Start: 2020-02-10 | End: 2020-02-10

## 2020-02-09 RX ADMIN — CHLORHEXIDINE GLUCONATE 1 APPLICATION(S): 213 SOLUTION TOPICAL at 11:12

## 2020-02-09 RX ADMIN — AMIODARONE HYDROCHLORIDE 200 MILLIGRAM(S): 400 TABLET ORAL at 06:39

## 2020-02-09 RX ADMIN — BRIMONIDINE TARTRATE 1 DROP(S): 2 SOLUTION/ DROPS OPHTHALMIC at 13:24

## 2020-02-09 RX ADMIN — LATANOPROST 1 DROP(S): 0.05 SOLUTION/ DROPS OPHTHALMIC; TOPICAL at 11:12

## 2020-02-09 RX ADMIN — Medication 25 MILLIGRAM(S): at 21:35

## 2020-02-09 RX ADMIN — BRIMONIDINE TARTRATE 1 DROP(S): 2 SOLUTION/ DROPS OPHTHALMIC at 06:39

## 2020-02-09 RX ADMIN — Medication 1 TABLET(S): at 11:13

## 2020-02-09 RX ADMIN — OXYCODONE HYDROCHLORIDE 5 MILLIGRAM(S): 5 TABLET ORAL at 20:41

## 2020-02-09 RX ADMIN — MICAFUNGIN SODIUM 105 MILLIGRAM(S): 100 INJECTION, POWDER, LYOPHILIZED, FOR SOLUTION INTRAVENOUS at 15:46

## 2020-02-09 RX ADMIN — MEROPENEM 100 MILLIGRAM(S): 1 INJECTION INTRAVENOUS at 13:23

## 2020-02-09 RX ADMIN — GABAPENTIN 100 MILLIGRAM(S): 400 CAPSULE ORAL at 17:17

## 2020-02-09 RX ADMIN — BRIMONIDINE TARTRATE 1 DROP(S): 2 SOLUTION/ DROPS OPHTHALMIC at 21:35

## 2020-02-09 RX ADMIN — OXYCODONE HYDROCHLORIDE 10 MILLIGRAM(S): 5 TABLET ORAL at 13:57

## 2020-02-09 RX ADMIN — Medication 100 MILLIGRAM(S): at 11:12

## 2020-02-09 RX ADMIN — OXYCODONE HYDROCHLORIDE 5 MILLIGRAM(S): 5 TABLET ORAL at 06:46

## 2020-02-09 RX ADMIN — OXYCODONE HYDROCHLORIDE 5 MILLIGRAM(S): 5 TABLET ORAL at 07:16

## 2020-02-09 RX ADMIN — GABAPENTIN 100 MILLIGRAM(S): 400 CAPSULE ORAL at 06:38

## 2020-02-09 RX ADMIN — OXYCODONE HYDROCHLORIDE 5 MILLIGRAM(S): 5 TABLET ORAL at 20:11

## 2020-02-09 RX ADMIN — APIXABAN 2.5 MILLIGRAM(S): 2.5 TABLET, FILM COATED ORAL at 06:39

## 2020-02-09 RX ADMIN — OXYCODONE HYDROCHLORIDE 10 MILLIGRAM(S): 5 TABLET ORAL at 13:27

## 2020-02-09 RX ADMIN — FAMOTIDINE 20 MILLIGRAM(S): 10 INJECTION INTRAVENOUS at 11:12

## 2020-02-09 RX ADMIN — Medication 25 MILLIGRAM(S): at 06:38

## 2020-02-09 NOTE — PROGRESS NOTE ADULT - ASSESSMENT
49M PMHx of ESRD on HD  ( via LUE AVF), HTN, Gout, Glaucoma, NET of pancreas, RA with hand contractures. s/p DDRT 12/8/19,  Post op course c/b DGF requiring HD. Renal bx x 2 c/w profound ATN.     Re-admitted with Influenza and perinephric hematoma. s/p hematoma evacuation/ abd washout and renal biopsy. Post op course c/b leukocytosis, infected hematoma, acute RLE DVT, and bleeding pseudoaneurysm at txp renal artery anastomosis. Now s/p graft nephrectomy. Course further complicated by saddle PE causing right heart strain and hemodynamic instability leading to cardiac arrest requiring ACLS,  Required IR drainage of  post-op collections x 2  now complicated by bleeding following cannulation of  LUE AVF with findings of a tiny pseudoaneurysm arising from a distal branch of the left deep brachial artery s/p dissection of a tiny feeder artery that resulted in resolution of the pseudoaneurysm.    [] DDRT c/b DGF/ATN/perinephric infected hematoma/pseudoneurysm/graft nephrectomy   - S/P AFV cannulation for HD c/b infiltration causing extravasation/bleeding to the left upper arm and chest noted on CTA  - S/P IR LUE arteriography showing a tiny pseudoaneurysm arising from a distal branch of the left deep brachial artery s/p dissection of a tiny feeder artery that resulted in resolution of the pseudoaneurysm.  - Daily CBC  - ID: [id number] nina placed 2/4. Per ID, needs two neg blood cultures before getting permcath.   - Increase Eliquis to 5mg BID   - Bedside IR drainage of RLQ fluid collection (1/15), drain repositioned 1/22 and cultures NTD  - ID following;  cont Meropenem, Micafungin 100mg daily.   ID plan for discharge on cipro.  - pain control  - bowel regimen  -on pepcid.   - PT/OT/OOB.  VAC change per PT  - Thigh high compression stocking to LLE  - Knee high compression stocking to RLE with ace wrap from knee to thigh.    - ACE wrap LUE from hand to shoulder.   - Left arm/posterior axilla  ultrasound today to assess swelling      [] Saddle PE, with right heart strain and complicated by PEA arrest followed by Torsade, ROSC achieved after ACLS  - vascular, CT and vascular cardiology following   -Repeat chest CT 1/28 noted Persistent bilateral lower lobe pulmonary emboli.   - CT 1/21: There are pulmonary emboli within the right lower lobar arteries and the left lower lobar arteries extending into the distal segments as seen on prior study. The pulmonary emboli seen in the right and left pulmonary arteries are no longer visualized. New distal to mid brachial DVT noted  - 1/19 Duplex: RUE Deep vein thrombosis in the mid to distal brachial vein.  - repeat ECHO with improved R heart strain  - Increase Eliquis to 5mg BID today     [] New Afib/Vtech  Cardiology following, BB low dose in the setting of WCT and hold BB on HD days  Continue amiodarone, card plan to continue for couple of months and will f/u as outpt    [] New acute DVT of R distal brachia vein , New acute DVT of R external iliac, common femoral, femoral veins, R soleal vein   - IVC filter placed 1/9, Increase Eliquis to 5mg BID today    [] Thrombocytosis  - [id number] from 430 ; were previously in 800s; cont to trend.    -Heme consulted; likely reactive.     [] Dispo  -discharge to acute rehab once medically stable. 49M PMHx of ESRD on HD  ( via LUE AVF), HTN, Gout, Glaucoma, NET of pancreas, RA with hand contractures. s/p DDRT 12/8/19,  Post op course c/b DGF requiring HD. Renal bx x 2 c/w profound ATN.     Re-admitted with Influenza and perinephric hematoma. s/p hematoma evacuation/ abd washout and renal biopsy. Post op course c/b leukocytosis, infected hematoma, acute RLE DVT, and bleeding pseudoaneurysm at txp renal artery anastomosis. Now s/p graft nephrectomy. Course further complicated by saddle PE causing right heart strain and hemodynamic instability leading to cardiac arrest requiring ACLS,  Required IR drainage of  post-op collections x 2  now complicated by bleeding following cannulation of  LUE AVF with findings of a tiny pseudoaneurysm arising from a distal branch of the left deep brachial artery s/p dissection of a tiny feeder artery that resulted in resolution of the pseudoaneurysm.    [] DDRT c/b DGF/ATN/perinephric infected hematoma/pseudoneurysm/graft nephrectomy   - S/P AFV cannulation for HD c/b infiltration causing extravasation/bleeding to the left upper arm and chest noted on CTA  - S/P IR LUE arteriography showing a tiny pseudoaneurysm arising from a distal branch of the left deep brachial artery s/p dissection of a tiny feeder artery that resulted in resolution of the pseudoaneurysm.  - Daily CBC  - ID: [email or web address] nina placed 2/4. All recent cultures negatrive to date.  Will discuss with ID clearance for permacath placement early this week in preparation for discharge to rehab.   - Hold Eliquis and Aspirin in prep for tunnelled HD catheter.  Resume Eliquis 5mg BID and Aspirin 81mg daily  following procedure  - ID following;  Cont Meropenem and change to Cipro at time for discharge ->  End date 2/20.  Stop Micafungin following dose tomorrow.  - pain control  - bowel regimen  -on pepcid.   - PT/OT/OOB.  VAC change per PT  - Thigh high compression stocking/ACE wrap to bilateral LE    - ACE wrap LUE from hand to shoulder.       [] Saddle PE, with right heart strain and complicated by PEA arrest followed by Torsade, ROSC achieved after ACLS  - vascular, CT and vascular cardiology following   -Repeat chest CT 1/28 noted Persistent bilateral lower lobe pulmonary emboli.   - CT 1/21: There are pulmonary emboli within the right lower lobar arteries and the left lower lobar arteries extending into the distal segments as seen on prior study. The pulmonary emboli seen in the right and left pulmonary arteries are no longer visualized. New distal to mid brachial DVT noted  - 1/19 Duplex: RUE Deep vein thrombosis in the mid to distal brachial vein.  - repeat ECHO with improved R heart strain  -Resume anticoagulation post- permacath placement    [] New Afib/Vtech  - Cardiology following, BB low dose in the setting of WCT and hold BB on HD days  - Continue amiodarone, card plan to continue for couple of months and will f/u as outpt    [] New acute DVT of R distal brachia vein , New acute DVT of R external iliac, common femoral, femoral veins, R soleal vein   - IVC filter placed 1/9. Eliquis on hold    [] Thrombocytosis  - [id number] from 430 ; were previously in 800s; cont to trend.    -Heme consulted; likely reactive.   - resume ASA 81mg post- permacath placement    [] Dispo  -discharge to acute rehab once medically stable - potentially later this week

## 2020-02-09 NOTE — PROGRESS NOTE ADULT - SUBJECTIVE AND OBJECTIVE BOX
[facility] DIVISION OF KIDNEY DISEASES AND HYPERTENSION -- FOLLOW UP NOTE  --------------------------------------------------------------------------------  49 yr old man with ESRD s/p DDRT in Dec 2019 complicated by DGF due to ATN and early TMA thought to be due to prograf. Was on belatacept/MMF/Pred immunosuppression.   He was initially  admitted 6 weeks ago with influenza, course complicated by perinephric hematoma complicated by enterobacter infection requiring surgical exploration and wash out. Post op course complicated by DVT started on heparin drip, subsequent gross hematuria. He was taken to the OR and found to have dehiscence of the ureter-bladder anastomosis as well as vascular anastomosis. He underwent graft nephrectomy on 1/10/20 with bovine bio patch repair of the EIA. On 1/13/20 he had cardiac arrest due to large pulmonary embolism s/p successful resuscitation.   On 2/1 he had infiltration of his AV fistula with large hematoma. He is getting HD via right IJ temporary catheter.       24 hour events/subjective:  HD done yesterday. 2 liters removed. No major events.   C/o pain at left post shoulder site of skin tear and hematoma. No other complaints.         Standing Inpatient Medications  allopurinol 100 milliGRAM(s) Oral daily  aMIOdarone    Tablet 200 milliGRAM(s) Oral daily  brimonidine 0.2% Ophthalmic Solution 1 Drop(s) Both EYES three times a day  chlorhexidine 2% Cloths 1 Application(s) Topical <User Schedule>  epoetin trey Injectable 76920 Unit(s) IV Push <User Schedule>  famotidine    Tablet 20 milliGRAM(s) Oral daily  gabapentin 100 milliGRAM(s) Oral every 12 hours  latanoprost 0.005% Ophthalmic Solution 1 Drop(s) Both EYES daily  meropenem  IVPB 500 milliGRAM(s) IV Intermittent every 24 hours  metoprolol tartrate 25 milliGRAM(s) Oral every 12 hours  micafungin IVPB 100 milliGRAM(s) IV Intermittent every 24 hours  Nephro-candace 1 Tablet(s) Oral daily  polyethylene glycol 3350 17 Gram(s) Oral daily  senna 2 Tablet(s) Oral at bedtime    PRN Inpatient Medications  acetaminophen   Tablet .. 975 milliGRAM(s) Oral every 6 hours PRN  HYDROmorphone  Injectable 0.5 milliGRAM(s) IV Push every 6 hours PRN  oxyCODONE    IR 10 milliGRAM(s) Oral every 6 hours PRN  oxyCODONE    IR 5 milliGRAM(s) Oral every 4 hours PRN      VITALS/PHYSICAL EXAM  --------------------------------------------------------------------------------  T(C): 36.9 (02-09-20 @ 13:00), Max: 37.3 (02-09-20 @ 01:00)  HR: 60 (02-09-20 @ 13:00) (60 - 67)  BP: 133/62 (02-09-20 @ 13:00) (127/61 - 155/68)  RR: 18 (02-09-20 @ 13:00) (18 - 18)  SpO2: 96% (02-09-20 @ 13:00) (94% - 100%)        02-08-20 @ 07:01  -  02-09-20 @ 07:00  --------------------------------------------------------  IN: 1490 mL / OUT: 2900 mL / NET: -1410 mL    02-09-20 @ 07:01  -  02-09-20 @ 14:34  --------------------------------------------------------  IN: 120 mL / OUT: 50 mL / NET: 70 mL      Physical Exam:  	Alert oriented, NAD   	Right IJ temporary dialysis catheter               Lungs clear               Left post shoulder large mass (hematoma) ~ 10cm, area of skin tear. + tender around skin tear, mass not tender or warm               Extremities b/l leg swelling and LUE swelling extensive - all wrapped with ACE bandage. Left radial AVF  bruit     	    LABS/STUDIES  --------------------------------------------------------------------------------              8.0    15.99 >-----------<  653      [02-09-20 @ 06:39]              25.9     135  |  95  |  19  ----------------------------<  95      [02-09-20 @ 06:39]  4.0   |  27  |  4.49        Ca     8.8     [02-09-20 @ 06:39]      Mg     2.1     [02-09-20 @ 06:39]      Phos  3.2     [02-09-20 @ 06:39]    TPro  6.1  /  Alb  2.5  /  TBili  1.2  /  DBili  0.5  /  AST  21  /  ALT  <5  /  AlkPhos  219  [02-09-20 @ 06:39]    PT/INR: PT 15.5 , INR 1.34       [02-09-20 @ 06:39]  PTT: 37.2       [02-09-20 @ 06:39]      Creatinine Trend:  SCr 4.49 [02-09 @ 06:39]  SCr 5.75 [02-08 @ 08:26]  SCr 4.33 [02-06 @ 22:42]  SCr 5.70 [02-06 @ 00:39]  SCr 4.82 [02-05 @ 05:57]          Iron 23, TIBC 189, %sat 12      [01-27-20 @ 14:03]  HbA1c 5.2      [01-13-20 @ 22:59]    HBsAb 351.0      [01-11-20 @ 16:09]  HBsAg Nonreact      [01-11-20 @ 16:09]  HBcAb Nonreact      [01-11-20 @ 16:09]  HCV 0.09, Nonreact      [01-11-20 @ 16:09]

## 2020-02-09 NOTE — PROGRESS NOTE ADULT - ASSESSMENT
49 year old male with ESRD on HD, now s/p ddrt on 12/8/2019 course complicated by TMA/profound ATN, who presents from a HD center with a fever and cough. He was initially admitted with influenza. He is s/p washout of infected collection and biopsy which revealed ATN.  He was found to have diffusely enlarging perinephric fluid collection.    While on telemetry, he had an episode of a wide complex tachycardia. It initially appears irregular, suggesting an atrial arrhythmia with aberrancy, though the remainder appears more like an episode of non-sustained VT.   s/p washout on 1/1/2020 with worsening sepsis and transfer to ICU, s/p IR drainage of perinephric collection on 1/6. 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 He underwent operative repair of right external iliac artery with pericardial patch, transplant nephrectomy, RLE thrombectomy, and IVC filter on 1/9.     s/p corrina ->tachy arrest with tdp, af rvr, now on amio, with large PE  He has now returned to the SICU with significant lue swelling and active arterial extravasation, with drop in blood counts. He is s/p angiogram with tiny pseudoaneurysm.    - remains in sr   - had developed malignant arrhythmias which seemed to start with bradycardia, leading to more tachycardia and bursts of VT and rapid AF.  The apparent af degenerated into torsades.  He was shocked and has been maintaining SR since. Arrhythmias were most likely triggered by the massive vte event, are unlikely to represent a primary cardiac issue  - cont amio at 200 daily. cont metoprolol   - repeat ekg daily to evaluate qtc, while on antifungals/amiodarone  - hold asa     - was on apixaban for his PE  - this has been held in the setting of significant arterial extravasation at his fistula. now resumed.  - trend CBC. Transfuse to keep hb >8  - restart a/c when no contraindication  - echo with preserved/hyperdynamic lv, with RV failure consistent with acute RV overload.  Repeat echo noted, and seemingly with improved RV function  - remains with marked leg edema/vol ol. cont hd    - Further cardiac workup will depend on clinical course.  - will follow with you

## 2020-02-09 NOTE — PROGRESS NOTE ADULT - ATTENDING COMMENTS
Seen and assessed on 2/9    doing well .  will hold anticoagulation so he can have his line tunneled for ongoing dialysis    swelling improving.

## 2020-02-09 NOTE — PROGRESS NOTE ADULT - SUBJECTIVE AND OBJECTIVE BOX
Transplant Surgery - Multidisciplinary Rounds  --------------------------------------------------------------  R DDRT    Date:  12/8/19     Present:  Patient seen with multidisciplinary team including Transplant Surgeons: Dr. Be. Transplant Nephrologist: Dr. Tamayo. NP: Aislinn Hunt in am rounds and examined with Dr. Be. Disciplines not in attendance will be notified of the plan.     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 Young, followed by Dr. Raphael, [facility] Oncologist), RA with hand contractures. He underwent DDRT on 12/8/19. Post op course c/b DGF requiring HD, thrombocytopenia, elevated LDH and haptoglobin. Schistocytes  on peripheral smear concerning for TMA. Envarsus held. Received Belatacept 12/13. Started on PLEX (12/12, 12/15). Underwent renal bx on 12/13 c/w profound ATN.  Discharged home on 12/20/19 w/ outpatient HD.     Re-admitted 12/27 with influenza, completed treatement with Tamiflu on 12/31. Urine cx on admission grew Ent cloacae, was started on meropenem 12/31. Renal US on admission with increasing hematoma.   ·	OR 1/1 for ex-lap, hematoma evacuation, washout and renal bx. (c/w ATN)  OR cxs grew CRE and e coli. Post op course c/b:   ·	fevers and leukocytosis, CT a/p (1/4) with increased perinephric collection. Underwent IR guided collection 1/6   ·	New onset hematuria, martinez inserted 1/7, started CBI  ·	RLE edema, RLE doppler (1/8) with acute above the knee thrombus of R external iliac, common femoral and femoral veins, Acute calf vein thrombus affecting R soleal vein, Heparin gtt initiated   ·	AMS 1/9  (head CT neg), hyponatremic  ·	Significant hematuria and bleeding around the martinez. Angio 1/9 showed actively bleeding pseudoaneurysm at the anastomosis of the transplant renal artery with the recipient iliac artery. A stent was placed in the iliac artery to  control hemorrhage. IVC filter placed.   ·	Emergent OR 1/9-1/10 for graft nephrectomy;  ureter to bladder anastomosis was completely disrupted, repaired the R external iliac artery with bovine pericardial patch, repaired right external iliac vein, performed thrombectomy of RLE veins, Intra op received 3u PRBC.   ·	Extubated 1/11  ·	1/13 Saddle PE, with right heart strain and complicated by PEA arrest followed by Torsade, ROSC achieved after ACLS; re-intubated  ·	1/15 Renal US with 13.7 x 5.0 x 1.5 cm in RLQ transplant bed; s/p bedside IR guided drainage, pigtail in place. Fluid cx to date with NG  ·	1/21 CT C/A/P showing persistent collection, catheter at superior portion of collection  ·	1/22 IR pigtail drain repositioned and upsized to 12Fr catheter w/ increased output  ·	1/23 Transitioned to Eliquis from Heparin drip  ·	2/1  AVF infiltrated during HD w/ - arterial extrav/ bleeding to the left upper arm and chest noted on CTA   ·	2/3 Left upper cavity arteriography demonstrated a tiny pseudoaneurysm arising from a distal branch of the left deep brachial artery. The tiny feeder artery was dissected resulting in resolution of the pseudoaneurysm    Interval events:    - H/H stable  8.0/25.9.  PLT uptrending 653   - TMax 37.3    WBC 16   On meropenem (12/31) and Micafungin (1/20. Fluc 1/5-1/20)   Blood cultures 1/16, 1/21, 1/24 Negative  - Anuric. HD done via R shiley,  -2L  - Still with 2-3+ LE edema, flank edema   - LUE edema with tenderness under axilla along rib cage, across left chest - improved. Skin tear under R axilla and along medial portion of RUE persist  -> tender.   - Notable swelling noted posterior to left axilla ->non-tender, firm to touch      cxs:   12/29: Urine Cx: Enterobacter Cloacae  1/1 OR Perinephric collection Cx:  Carbapenem resistant Ent Cloacae, E. Coli  1/1: R kidney abscess swab: Enterobacter Cloacae  1/1  OR tissue: Enterobacter Cloacae  1/5: Urine Cx: Enteropbacter Cloacae  1/5: Skin incision Cx (bedside): NG  1/6: Adominal fluid Cx from IR:  NG   1/7: Urine Cx:  NG  1/7: BCX2:  NG  1/10 retroperit hematoma - enerobacter   1/10 bladder hematoma - enterobacter  1/15 Drainage fluid - NGTD  1/16: BC X2: Negative  1/18: SPutum: Normal harvey  1/21: BC X2: negative  1/22: IR body fluid Cx: negative  1/24: body fluid fungal: ngtd  1/24: BC x2: negative    Potential Discharge date: pending clinical improvement       MEDICATIONS  (STANDING):  allopurinol 100 milliGRAM(s) Oral daily  aMIOdarone    Tablet 200 milliGRAM(s) Oral daily  brimonidine 0.2% Ophthalmic Solution 1 Drop(s) Both EYES three times a day  chlorhexidine 2% Cloths 1 Application(s) Topical <User Schedule>  epoetin trey Injectable 07665 Unit(s) IV Push <User Schedule>  famotidine    Tablet 20 milliGRAM(s) Oral daily  gabapentin 100 milliGRAM(s) Oral every 12 hours  latanoprost 0.005% Ophthalmic Solution 1 Drop(s) Both EYES daily  meropenem  IVPB 500 milliGRAM(s) IV Intermittent every 24 hours  metoprolol tartrate 25 milliGRAM(s) Oral every 12 hours  micafungin IVPB 100 milliGRAM(s) IV Intermittent every 24 hours  Nephro-candace 1 Tablet(s) Oral daily  polyethylene glycol 3350 17 Gram(s) Oral daily  senna 2 Tablet(s) Oral at bedtime    MEDICATIONS  (PRN):  acetaminophen   Tablet .. 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  Carpal tunnel syndrome  Brain cyst: had MRI recently- now gone  Anemia  Gastric ulcer: Long time ago  Rheumatoid arthritis  Renal transplant recipient  Breakdown (mechanical) of penile (implanted) prosthesis, sequela  End-stage renal disease (ESRD): PERMACATH PLACEMENT  Abscess of left buttock: s/p I &amp; D  AV fistula: placement left lower arm  S/P cystoscopy: stent placement &amp; removal for kidney stones, lithiotripsy  s/p Lt Carpal tunnel: 2011      Vital Signs Last 24 Hrs  T(C): 36.8 (09 Feb 2020 09:00), Max: 37.3 (09 Feb 2020 01:00)  T(F): 98.2 (09 Feb 2020 09:00), Max: 99.2 (09 Feb 2020 01:00)  HR: 65 (09 Feb 2020 09:00) (65 - 67)  BP: 148/65 (09 Feb 2020 09:00) (127/61 - 157/72)  BP(mean): 98 (08 Feb 2020 16:25) (98 - 98)  RR: 18 (09 Feb 2020 09:00) (18 - 18)  SpO2: 94% (09 Feb 2020 09:00) (94% - 100%)    I&O's Summary    08 Feb 2020 07:01  -  09 Feb 2020 07:00  --------------------------------------------------------  IN: 1490 mL / OUT: 2900 mL / NET: -1410 mL    09 Feb 2020 07:01  -  09 Feb 2020 11:20  --------------------------------------------------------  IN: 120 mL / OUT: 50 mL / NET: 70 mL                              8.0    15.99 )-----------( 653      ( 09 Feb 2020 06:39 )             25.9     02-09    135  |  95<L>  |  19  ----------------------------<  95  4.0   |  27  |  4.49<H>    Ca    8.8      09 Feb 2020 06:39  Phos  3.2     02-09  Mg     2.1     02-09    TPro  6.1  /  Alb  2.5<L>  /  TBili  1.2  /  DBili  0.5<H>  /  AST  21  /  ALT  <5<L>  /  AlkPhos  219<H>  02-09        REVIEW OF SYSTEMS  Gen: + weakness   Skin: No rashes  Head/Eyes/Ears/Mouth: No headache; Normal hearing; Normal vision w/o blurriness; No sinus pain/discomfort, sore throat  Respiratory: no SOB  CV: No chest pain, PND, orthopnea  GI: incisional tenderness   : anuric  MSK: c/o LUE pain and tenderness but reports improvement.   Neuro: No dizziness/lightheadedness, weakness, seizures, numbness, tingling  Heme: No easy bruising or bleeding  Endo: No heat/cold intolerance  Psych: No significant nervousness, anxiety, stress, depression      PHYSICAL EXAM:    Constitutional: NAD  	Eyes: Anicteric, PERRLA  	Respiratory: CTA b/l  	Cardiovascular: RRR  	Gastrointestinal: Soft abdomen, appropriate incisional TTP, ND. Wound vac ss/murky output    Genitourinary:  anuric, large amount scrotal edema     Extremities: LUE and left chest edema, improved.  Notable swelling posterior to left axilla, + TTP.  Superficial skin tear on LUE.  2+ BLE edema with TEDS stockings to knee and ace wrap above knee. + flank edema.   	Vascular: DP signal bilateral, L AVF palpable  	Neurological: A&O x3.  	Skin: no new lesions/ulcerations  	Musculoskeletal: Moving all extremities Transplant Surgery - Multidisciplinary Rounds  --------------------------------------------------------------  R DDRT    Date:  12/8/19     Present:  Patient seen with multidisciplinary team including Transplant Surgeons: Dr. Be. Transplant Nephrologist: Dr. Tamayo. NP: Aislinn Hunt in am rounds and examined with Dr. Be. Disciplines not in attendance will be notified of the plan.     HPI: 49M PMHx of ESRD on HD  ( via LUE AVF), HTN, Gout, Glaucoma, NET of pancreas (s/p robotic distal panc/splenectomy at [place] 2015 by Dr. Young, followed by Dr. Rahpael, [facility] Oncologist), RA with hand contractures. He underwent DDRT on 12/8/19. Post op course c/b DGF requiring HD, thrombocytopenia, elevated LDH and haptoglobin. Schistocytes  on peripheral smear concerning for TMA. Envarsus held. Received Belatacept 12/13. Started on PLEX (12/12, 12/15). Underwent renal bx on 12/13 c/w profound ATN.  Discharged home on 12/20/19 w/ outpatient HD.     Re-admitted 12/27 with influenza, completed treatement with Tamiflu on 12/31. Urine cx on admission grew Ent cloacae, was started on meropenem 12/31. Renal US on admission with increasing hematoma.   ·	OR 1/1 for ex-lap, hematoma evacuation, washout and renal bx. (c/w ATN)  OR cxs grew CRE and e coli. Post op course c/b:   ·	fevers and leukocytosis, CT a/p (1/4) with increased perinephric collection. Underwent IR guided collection 1/6   ·	New onset hematuria, martinez inserted 1/7, started CBI  ·	RLE edema, RLE doppler (1/8) with acute above the knee thrombus of R external iliac, common femoral and femoral veins, Acute calf vein thrombus affecting R soleal vein, Heparin gtt initiated   ·	AMS 1/9  (head CT neg), hyponatremic  ·	Significant hematuria and bleeding around the martinez. Angio 1/9 showed actively bleeding pseudoaneurysm at the anastomosis of the transplant renal artery with the recipient iliac artery. A stent was placed in the iliac artery to  control hemorrhage. IVC filter placed.   ·	Emergent OR 1/9-1/10 for graft nephrectomy;  ureter to bladder anastomosis was completely disrupted, repaired the R external iliac artery with bovine pericardial patch, repaired right external iliac vein, performed thrombectomy of RLE veins, Intra op received 3u PRBC.   ·	Extubated 1/11  ·	1/13 Saddle PE, with right heart strain and complicated by PEA arrest followed by Torsade, ROSC achieved after ACLS; re-intubated  ·	1/15 Renal US with 13.7 x 5.0 x 1.5 cm in RLQ transplant bed; s/p bedside IR guided drainage, pigtail in place. Fluid cx to date with NG  ·	1/21 CT C/A/P showing persistent collection, catheter at superior portion of collection  ·	1/22 IR pigtail drain repositioned and upsized to 12Fr catheter w/ increased output  ·	1/23 Transitioned to Eliquis from Heparin drip  ·	2/1  AVF infiltrated during HD w/ - arterial extrav/ bleeding to the left upper arm and chest noted on CTA   ·	2/3 Left upper cavity arteriography demonstrated a tiny pseudoaneurysm arising from a distal branch of the left deep brachial artery. The tiny feeder artery was dissected resulting in resolution of the pseudoaneurysm    Interval events:    - H/H stable  8.0/25.9.  PLT uptrending 653   - TMax 37.3    WBC 16   On meropenem (12/31) and Micafungin (1/20. Fluc 1/5-1/20)   Blood cultures 1/16, 1/21, 1/24 Negative  - Anuric. HD done via R shiley,  -2L  - Still with 2-3+ LE edema, flank edema   - LUE edema with tenderness under axilla along rib cage, across left chest - improved. Skin tear under R axilla and along medial portion of RUE persist  -> tender.   - Notable swelling noted posterior to left axilla ->non-tender, firm to touch, Unchanged      cxs:   12/29: Urine Cx: Enterobacter Cloacae  1/1 OR Perinephric collection Cx:  Carbapenem resistant Ent Cloacae, E. Coli  1/1: R kidney abscess swab: Enterobacter Cloacae  1/1  OR tissue: Enterobacter Cloacae  1/5: Urine Cx: Enteropbacter Cloacae  1/5: Skin incision Cx (bedside): NG  1/6: Adominal fluid Cx from IR:  NG   1/7: Urine Cx:  NG  1/7: BCX2:  NG  1/10 retroperit hematoma - enerobacter   1/10 bladder hematoma - enterobacter  1/15 Drainage fluid - NGTD  1/16: BC X2: Negative  1/18: SPutum: Normal harvey  1/21: BC X2: negative  1/22: IR body fluid Cx: negative  1/24: body fluid fungal: ngtd  1/24: BC x2: negative    Potential Discharge date: pending clinical improvement       MEDICATIONS  (STANDING):  allopurinol 100 milliGRAM(s) Oral daily  aMIOdarone    Tablet 200 milliGRAM(s) Oral daily  brimonidine 0.2% Ophthalmic Solution 1 Drop(s) Both EYES three times a day  chlorhexidine 2% Cloths 1 Application(s) Topical <User Schedule>  epoetin trey Injectable 04470 Unit(s) IV Push <User Schedule>  famotidine    Tablet 20 milliGRAM(s) Oral daily  gabapentin 100 milliGRAM(s) Oral every 12 hours  latanoprost 0.005% Ophthalmic Solution 1 Drop(s) Both EYES daily  meropenem  IVPB 500 milliGRAM(s) IV Intermittent every 24 hours  metoprolol tartrate 25 milliGRAM(s) Oral every 12 hours  micafungin IVPB 100 milliGRAM(s) IV Intermittent every 24 hours  Nephro-candace 1 Tablet(s) Oral daily  polyethylene glycol 3350 17 Gram(s) Oral daily  senna 2 Tablet(s) Oral at bedtime    MEDICATIONS  (PRN):  acetaminophen   Tablet .. 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  Carpal tunnel syndrome  Brain cyst: had MRI recently- now gone  Anemia  Gastric ulcer: Long time ago  Rheumatoid arthritis  Renal transplant recipient  Breakdown (mechanical) of penile (implanted) prosthesis, sequela  End-stage renal disease (ESRD): PERMACATH PLACEMENT  Abscess of left buttock: s/p I &amp; D  AV fistula: placement left lower arm  S/P cystoscopy: stent placement &amp; removal for kidney stones, lithiotripsy  s/p Lt Carpal tunnel: 2011      Vital Signs Last 24 Hrs  T(C): 36.8 (09 Feb 2020 09:00), Max: 37.3 (09 Feb 2020 01:00)  T(F): 98.2 (09 Feb 2020 09:00), Max: 99.2 (09 Feb 2020 01:00)  HR: 65 (09 Feb 2020 09:00) (65 - 67)  BP: 148/65 (09 Feb 2020 09:00) (127/61 - 157/72)  BP(mean): 98 (08 Feb 2020 16:25) (98 - 98)  RR: 18 (09 Feb 2020 09:00) (18 - 18)  SpO2: 94% (09 Feb 2020 09:00) (94% - 100%)    I&O's Summary    08 Feb 2020 07:01  -  09 Feb 2020 07:00  --------------------------------------------------------  IN: 1490 mL / OUT: 2900 mL / NET: -1410 mL    09 Feb 2020 07:01  -  09 Feb 2020 11:20  --------------------------------------------------------  IN: 120 mL / OUT: 50 mL / NET: 70 mL                              8.0    15.99 )-----------( 653      ( 09 Feb 2020 06:39 )             25.9     02-09    135  |  95<L>  |  19  ----------------------------<  95  4.0   |  27  |  4.49<H>    Ca    8.8      09 Feb 2020 06:39  Phos  3.2     02-09  Mg     2.1     02-09    TPro  6.1  /  Alb  2.5<L>  /  TBili  1.2  /  DBili  0.5<H>  /  AST  21  /  ALT  <5<L>  /  AlkPhos  219<H>  02-09        REVIEW OF SYSTEMS  Gen: + weakness   Skin: No rashes  Head/Eyes/Ears/Mouth: No headache; Normal hearing; Normal vision w/o blurriness; No sinus pain/discomfort, sore throat  Respiratory: no SOB  CV: No chest pain, PND, orthopnea  GI: no diarrhea, no constipation   : anuric  MSK: c/o LUE pain and tenderness but reports improvement.   Neuro: No dizziness/lightheadedness, weakness, seizures, numbness, tingling  Heme: No easy bruising or bleeding  Endo: No heat/cold intolerance  Psych: No significant nervousness, anxiety, stress, depression      PHYSICAL EXAM:    Constitutional: NAD  	Eyes: Anicteric, PERRLA  	Respiratory: CTA b/l  	Cardiovascular: RRR  	Gastrointestinal: Soft abdomen, appropriate incisional TTP, ND. Wound vac patent    Genitourinary:  anuric, significant scrotal edema     Extremities: LUE and left chest edema, improved.  Notable swelling posterior to left axilla, firm to touch, no tender. Skin tear noted on medial aspect LUE and in axilla - tender. 2-3+ BLE edema with ace wraps above knee. + flank edema.  Small superificial blister anterior R foot and left foot  	Vascular: DP signal bilateral, L AVF palpable  	Neurological: A&O x3.  	Skin: no new lesions  	Musculoskeletal: Moving all extremities

## 2020-02-09 NOTE — PROGRESS NOTE ADULT - PROBLEM SELECTOR PLAN 1
s/p graft nephrectomy   Electrolytes acceptable, volume status difficult to assess due to extensive LE DVT with b/l edema but tolerating UF 2 liters with HD. Hemodynamically stable.   Continue HD 3 times a week per primary nephrologist.   Patient does not wish to use the AV fistula until his arm is completely healed   Will need to change right IJ catheter to tunneled catheter. hold eliquis for now, IR consult for permCath placement.

## 2020-02-09 NOTE — PROGRESS NOTE ADULT - SUBJECTIVE AND OBJECTIVE BOX
[facility]    Melissa Kelsey, Enrique, Maximino, Marin, Hamzah, Ravi      [phone]    CHIEF COMPLAINT: Patient is a 49y old  Male who presents with a chief complaint of Fever (08 Feb 2020 19:36)      Follow Up: tdp arrest    Interim history: The patient reports no new symptoms.  Denies chest discomfort and shortness of breath.  No abdominal pain.  Legs remain edematous and wrapped.  No new neurologic symptoms.      MEDICATIONS  (STANDING):  allopurinol 100 milliGRAM(s) Oral daily  aMIOdarone    Tablet 200 milliGRAM(s) Oral daily  apixaban 2.5 milliGRAM(s) Oral every 12 hours  brimonidine 0.2% Ophthalmic Solution 1 Drop(s) Both EYES three times a day  chlorhexidine 2% Cloths 1 Application(s) Topical <User Schedule>  epoetin trey Injectable 08017 Unit(s) IV Push <User Schedule>  famotidine    Tablet 20 milliGRAM(s) Oral daily  gabapentin 100 milliGRAM(s) Oral every 12 hours  latanoprost 0.005% Ophthalmic Solution 1 Drop(s) Both EYES daily  meropenem  IVPB 500 milliGRAM(s) IV Intermittent every 24 hours  metoprolol tartrate 25 milliGRAM(s) Oral every 12 hours  micafungin IVPB 100 milliGRAM(s) IV Intermittent every 24 hours  Nephro-candace 1 Tablet(s) Oral daily  polyethylene glycol 3350 17 Gram(s) Oral daily  senna 2 Tablet(s) Oral at bedtime    MEDICATIONS  (PRN):  acetaminophen   Tablet .. 975 milliGRAM(s) Oral every 6 hours PRN Mild Pain (1 - 3)  HYDROmorphone  Injectable 0.5 milliGRAM(s) IV Push every 6 hours PRN Breakthrough Pain  oxyCODONE    IR 10 milliGRAM(s) Oral every 6 hours PRN Severe Pain (7 - 10)  oxyCODONE    IR 5 milliGRAM(s) Oral every 4 hours PRN Moderate Pain (4 - 6)      REVIEW OF SYSTEMS:  eye, ent, GI, , allergic, dermatologic, musculoskeletal and neurologic are negative except as described above    Vital Signs Last 24 Hrs  T(C): 37.1 (09 Feb 2020 05:00), Max: 37.3 (09 Feb 2020 01:00)  T(F): 98.8 (09 Feb 2020 05:00), Max: 99.2 (09 Feb 2020 01:00)  HR: 67 (09 Feb 2020 05:00) (65 - 67)  BP: 138/76 (09 Feb 2020 05:00) (127/61 - 157/72)  BP(mean): 98 (08 Feb 2020 16:25) (98 - 98)  RR: 18 (09 Feb 2020 05:00) (18 - 18)  SpO2: 100% (09 Feb 2020 05:00) (97% - 100%)    I&O's Summary    08 Feb 2020 07:01  -  09 Feb 2020 07:00  --------------------------------------------------------  IN: 1490 mL / OUT: 2900 mL / NET: -1410 mL        Telemetry past 24h:    PHYSICAL EXAM:    Constitutional: well-nourished, well-developed, NAD   HEENT:  MMM, sclerae anicteric, conjunctivae clear, no oral cyanosis.  Pulmonary: Non-labored, breath sounds are clear ant , No wheezing, rales or rhonchi  Cardiovascular: Regular, S1 and S2.  No murmur.  No rubs, gallops or clicks  Gastrointestinal: Bowel Sounds present, soft, nontender.   Lymph: N+++ o peripheral edema.   Neurological: Alert, no focal deficits  Skin: No rashes.  Psych:  Mood & affect appropriate    LABS: All Labs Reviewed:                        8.0    15.99 )-----------( 653      ( 09 Feb 2020 06:39 )             25.9                         8.1    16.64 )-----------( 615      ( 08 Feb 2020 08:26 )             25.7                         8.0    15.77 )-----------( 430      ( 06 Feb 2020 22:42 )             25.0     09 Feb 2020 06:39    135    |  95     |  19     ----------------------------<  95     4.0     |  27     |  4.49   08 Feb 2020 08:26    136    |  95     |  28     ----------------------------<  92     4.5     |  25     |  5.75   06 Feb 2020 22:42    137    |  96     |  18     ----------------------------<  104    3.9     |  26     |  4.33     Ca    8.8        09 Feb 2020 06:39  Ca    8.9        08 Feb 2020 08:26  Ca    8.4        06 Feb 2020 22:42  Phos  3.2       09 Feb 2020 06:39  Phos  3.6       08 Feb 2020 08:26  Phos  3.3       06 Feb 2020 22:42  Mg     2.1       09 Feb 2020 06:39  Mg     2.1       08 Feb 2020 08:26  Mg     2.0       06 Feb 2020 22:42    TPro  6.1    /  Alb  2.5    /  TBili  1.2    /  DBili  0.5    /  AST  21     /  ALT  <5     /  AlkPhos  219    09 Feb 2020 06:39  TPro  6.5    /  Alb  2.6    /  TBili  1.0    /  DBili  0.4    /  AST  19     /  ALT  <5     /  AlkPhos  228    08 Feb 2020 08:26  TPro  5.9    /  Alb  2.1    /  TBili  0.8    /  DBili  0.4    /  AST  18     /  ALT  <5     /  AlkPhos  197    06 Feb 2020 22:42    PT/INR - ( 09 Feb 2020 06:39 )   PT: 15.5 sec;   INR: 1.34 ratio         PTT - ( 09 Feb 2020 06:39 )  PTT:37.2 sec      Blood Culture:         RADIOLOGY:    EKG:    Echo:

## 2020-02-09 NOTE — PROGRESS NOTE ADULT - PROBLEM SELECTOR PLAN 2
S/p DVT and PE, Eliquis was on hold due to AVF infiltration and hematoma.   Hold eliquis until permCath placed

## 2020-02-10 LAB
ALBUMIN SERPL ELPH-MCNC: 2.8 G/DL — LOW (ref 3.3–5)
ALP SERPL-CCNC: 239 U/L — HIGH (ref 40–120)
ALT FLD-CCNC: <5 U/L — LOW (ref 10–45)
ANION GAP SERPL CALC-SCNC: 15 MMOL/L — SIGNIFICANT CHANGE UP (ref 5–17)
APTT BLD: 39.3 SEC — HIGH (ref 27.5–36.3)
AST SERPL-CCNC: 22 U/L — SIGNIFICANT CHANGE UP (ref 10–40)
BILIRUB DIRECT SERPL-MCNC: 0.6 MG/DL — HIGH (ref 0–0.2)
BILIRUB INDIRECT FLD-MCNC: 0.8 MG/DL — SIGNIFICANT CHANGE UP (ref 0.2–1)
BILIRUB SERPL-MCNC: 1.4 MG/DL — HIGH (ref 0.2–1.2)
BUN SERPL-MCNC: 27 MG/DL — HIGH (ref 7–23)
CALCIUM SERPL-MCNC: 9.5 MG/DL — SIGNIFICANT CHANGE UP (ref 8.4–10.5)
CHLORIDE SERPL-SCNC: 95 MMOL/L — LOW (ref 96–108)
CO2 SERPL-SCNC: 25 MMOL/L — SIGNIFICANT CHANGE UP (ref 22–31)
CREAT SERPL-MCNC: 5.67 MG/DL — HIGH (ref 0.5–1.3)
GLUCOSE SERPL-MCNC: 98 MG/DL — SIGNIFICANT CHANGE UP (ref 70–99)
HCT VFR BLD CALC: 27.2 % — LOW (ref 39–50)
HGB BLD-MCNC: 8.7 G/DL — LOW (ref 13–17)
INR BLD: 1.3 RATIO — HIGH (ref 0.88–1.16)
MAGNESIUM SERPL-MCNC: 2.2 MG/DL — SIGNIFICANT CHANGE UP (ref 1.6–2.6)
MCHC RBC-ENTMCNC: 30.4 PG — SIGNIFICANT CHANGE UP (ref 27–34)
MCHC RBC-ENTMCNC: 32 GM/DL — SIGNIFICANT CHANGE UP (ref 32–36)
MCV RBC AUTO: 95.1 FL — SIGNIFICANT CHANGE UP (ref 80–100)
NRBC # BLD: 0 /100 WBCS — SIGNIFICANT CHANGE UP (ref 0–0)
PHOSPHATE SERPL-MCNC: 4 MG/DL — SIGNIFICANT CHANGE UP (ref 2.5–4.5)
PLATELET # BLD AUTO: 739 K/UL — HIGH (ref 150–400)
POTASSIUM SERPL-MCNC: 4.3 MMOL/L — SIGNIFICANT CHANGE UP (ref 3.5–5.3)
POTASSIUM SERPL-SCNC: 4.3 MMOL/L — SIGNIFICANT CHANGE UP (ref 3.5–5.3)
PROCALCITONIN SERPL-MCNC: 0.91 NG/ML — HIGH (ref 0.02–0.1)
PROT SERPL-MCNC: 7.1 G/DL — SIGNIFICANT CHANGE UP (ref 6–8.3)
PROTHROM AB SERPL-ACNC: 14.9 SEC — HIGH (ref 10–12.9)
RBC # BLD: 2.86 M/UL — LOW (ref 4.2–5.8)
RBC # FLD: 15.7 % — HIGH (ref 10.3–14.5)
SODIUM SERPL-SCNC: 135 MMOL/L — SIGNIFICANT CHANGE UP (ref 135–145)
WBC # BLD: 13.68 K/UL — HIGH (ref 3.8–10.5)
WBC # FLD AUTO: 13.68 K/UL — HIGH (ref 3.8–10.5)

## 2020-02-10 PROCEDURE — 99232 SBSQ HOSP IP/OBS MODERATE 35: CPT | Mod: GC

## 2020-02-10 PROCEDURE — 99233 SBSQ HOSP IP/OBS HIGH 50: CPT

## 2020-02-10 PROCEDURE — 99232 SBSQ HOSP IP/OBS MODERATE 35: CPT

## 2020-02-10 PROCEDURE — 99231 SBSQ HOSP IP/OBS SF/LOW 25: CPT

## 2020-02-10 RX ORDER — NYSTATIN 500MM UNIT
500000 POWDER (EA) MISCELLANEOUS
Refills: 0 | Status: DISCONTINUED | OUTPATIENT
Start: 2020-02-10 | End: 2020-02-14

## 2020-02-10 RX ADMIN — AMIODARONE HYDROCHLORIDE 200 MILLIGRAM(S): 400 TABLET ORAL at 06:47

## 2020-02-10 RX ADMIN — Medication 25 MILLIGRAM(S): at 06:47

## 2020-02-10 RX ADMIN — Medication 100 MILLIGRAM(S): at 12:06

## 2020-02-10 RX ADMIN — OXYCODONE HYDROCHLORIDE 10 MILLIGRAM(S): 5 TABLET ORAL at 06:47

## 2020-02-10 RX ADMIN — HYDROMORPHONE HYDROCHLORIDE 0.5 MILLIGRAM(S): 2 INJECTION INTRAMUSCULAR; INTRAVENOUS; SUBCUTANEOUS at 11:15

## 2020-02-10 RX ADMIN — BRIMONIDINE TARTRATE 1 DROP(S): 2 SOLUTION/ DROPS OPHTHALMIC at 22:44

## 2020-02-10 RX ADMIN — Medication 25 MILLIGRAM(S): at 17:36

## 2020-02-10 RX ADMIN — OXYCODONE HYDROCHLORIDE 10 MILLIGRAM(S): 5 TABLET ORAL at 21:00

## 2020-02-10 RX ADMIN — OXYCODONE HYDROCHLORIDE 10 MILLIGRAM(S): 5 TABLET ORAL at 07:17

## 2020-02-10 RX ADMIN — HYDROMORPHONE HYDROCHLORIDE 0.5 MILLIGRAM(S): 2 INJECTION INTRAMUSCULAR; INTRAVENOUS; SUBCUTANEOUS at 10:42

## 2020-02-10 RX ADMIN — BRIMONIDINE TARTRATE 1 DROP(S): 2 SOLUTION/ DROPS OPHTHALMIC at 17:36

## 2020-02-10 RX ADMIN — GABAPENTIN 100 MILLIGRAM(S): 400 CAPSULE ORAL at 06:47

## 2020-02-10 RX ADMIN — GABAPENTIN 100 MILLIGRAM(S): 400 CAPSULE ORAL at 17:36

## 2020-02-10 RX ADMIN — OXYCODONE HYDROCHLORIDE 10 MILLIGRAM(S): 5 TABLET ORAL at 12:50

## 2020-02-10 RX ADMIN — FAMOTIDINE 20 MILLIGRAM(S): 10 INJECTION INTRAVENOUS at 12:06

## 2020-02-10 RX ADMIN — SENNA PLUS 2 TABLET(S): 8.6 TABLET ORAL at 22:44

## 2020-02-10 RX ADMIN — BRIMONIDINE TARTRATE 1 DROP(S): 2 SOLUTION/ DROPS OPHTHALMIC at 06:47

## 2020-02-10 RX ADMIN — CHLORHEXIDINE GLUCONATE 1 APPLICATION(S): 213 SOLUTION TOPICAL at 06:48

## 2020-02-10 RX ADMIN — OXYCODONE HYDROCHLORIDE 10 MILLIGRAM(S): 5 TABLET ORAL at 13:40

## 2020-02-10 RX ADMIN — OXYCODONE HYDROCHLORIDE 10 MILLIGRAM(S): 5 TABLET ORAL at 20:03

## 2020-02-10 RX ADMIN — MEROPENEM 100 MILLIGRAM(S): 1 INJECTION INTRAVENOUS at 12:06

## 2020-02-10 RX ADMIN — LATANOPROST 1 DROP(S): 0.05 SOLUTION/ DROPS OPHTHALMIC; TOPICAL at 12:06

## 2020-02-10 RX ADMIN — MICAFUNGIN SODIUM 105 MILLIGRAM(S): 100 INJECTION, POWDER, LYOPHILIZED, FOR SOLUTION INTRAVENOUS at 14:16

## 2020-02-10 RX ADMIN — Medication 500000 UNIT(S): at 17:36

## 2020-02-10 NOTE — PROGRESS NOTE ADULT - ASSESSMENT
48 y/o male presenting with influenza A with a hospital course complicated by Infected transplant perinephric hematoma s/p right groin exploration, washout, & biopsy c/b persistent perinephric collection s/p FNA, Right external iliac & common femoral DVTs s/p IVC filter & thrombectomy of the RLE veins, Gross hematuria w/ pseudoaneurysm of the right external iliac & transplant renal artery seen on IR angiogram s/p right groin exploration, washout of the RP space, and transplant nephrectomy w/ repair of the right external iliac artery w/ a bovine patch, Iliac fossa collection s/p IR drainage, Cardiac arrest secondary to a saddle PE, now with L infiltration with likely small amount of ongoing active bleeding with large LUE hematoma while patient was on therapeutic anticoagulation.    Plan:  - Pain control  - Trend H&H  - Evaluate AVF function on next HD session - patient refused to be evaluated with HD on Saturday.  Would try again tomorrow, try access at Forearm only  - continue with compression and elevation  - continue to monitor neurovascular exam- no neurovascular deficits noted at this time   - continue excellent care per transplant    VASCULAR SURGERY [phone]

## 2020-02-10 NOTE — PROGRESS NOTE ADULT - ASSESSMENT
49M PMHx of ESRD on HD  ( via LUE AVF), HTN, Gout, Glaucoma, NET of pancreas, RA with hand contractures. s/p DDRT 12/8/19,  Post op course c/b DGF requiring HD. Renal bx x 2 c/w profound ATN.     Re-admitted with Influenza and perinephric hematoma. s/p hematoma evacuation/ abd washout and renal biopsy. Post op course c/b leukocytosis, infected hematoma, acute RLE DVT, and bleeding pseudoaneurysm at txp renal artery anastomosis. Now s/p graft nephrectomy. Course further complicated by saddle PE causing right heart strain and hemodynamic instability leading to cardiac arrest requiring ACLS,  Required IR drainage of  post-op collections x 2  now complicated by bleeding following cannulation of  LUE AVF with findings of a tiny pseudoaneurysm arising from a distal branch of the left deep brachial artery s/p dissection of a tiny feeder artery that resulted in resolution of the pseudoaneurysm.    [] DDRT c/b DGF/ATN/perinephric infected hematoma/pseudoneurysm/graft nephrectomy   - S/P AFV cannulation for HD c/b infiltration causing extravasation/bleeding to the left upper arm and chest noted on CTA  - S/P IR LUE arteriography showing a tiny pseudoaneurysm arising from a distal branch of the left deep brachial artery s/p dissection of a tiny feeder artery that resulted in resolution of the pseudoaneurysm.  - Daily CBC  - ID: [id number] nina placed 2/4. All recent cultures negatrive to date.  IR called  for permacath placement early this week in preparation for discharge to rehab.   - Hold Eliquis and Aspirin in prep for tunnelled HD catheter.  Resume Eliquis 5mg BID and Aspirin 81mg daily  following procedure  - ID following;  Cont Meropenem and change to Cipro at time for discharge ->  End date 2/20.  Stop Micafungin following dose tomorrow.  - pain control  - bowel regimen  -on pepcid.   - PT/OT/OOB.    - as per PT wound w/ granuloma tissue may take off VAC and perform daily dressing changes w/ aquacel.  - Thigh high compression stocking/ACE wrap to bilateral LE    - ACE wrap LUE from hand to shoulder.       [] Saddle PE, with right heart strain and complicated by PEA arrest followed by T[place], ROSC achieved after ACLS  - vascular, CT and vascular cardiology following   -Repeat chest CT 1/28 noted Persistent bilateral lower lobe pulmonary emboli.   - CT 1/21: There are pulmonary emboli within the right lower lobar arteries and the left lower lobar arteries extending into the distal segments as seen on prior study. The pulmonary emboli seen in the right and left pulmonary arteries are no longer visualized. New distal to mid brachial DVT noted  - 1/19 Duplex: RUE Deep vein thrombosis in the mid to distal brachial vein.  - repeat ECHO with improved R heart strain  -Resume anticoagulation post- permacath placement    [] New Afib/Vtech  - Cardiology following, BB low dose in the setting of WCT and hold BB on HD days  - Continue amiodarone, card plan to continue for couple of months and will f/u as outpt    [] New acute DVT of R distal brachia vein , New acute DVT of R external iliac, common femoral, femoral veins, R soleal vein   - IVC filter placed 1/9. Eliquis on hold    [] Thrombocytosis  - [id number] ; were previously in 800s; cont to trend.    -Heme consulted; likely reactive.   - resume ASA 81mg post- permacath placement    [] Dispo  -discharge to acute rehab once medically stable - potentially later this week

## 2020-02-10 NOTE — PROGRESS NOTE ADULT - SUBJECTIVE AND OBJECTIVE BOX
[facility] NEPHROLOGY SERVICES, [facility]  NEPHROLOGY AND HYPERTENSION  [address]  [address]  [place] [address]  [phone]    MD GIANA GUERRERO, MD JANAE MCDONNELL, MD BARBARA MONET, MD SALUD GARZA, MD CATRINA WOO MD    Awake, no distress    Vital Signs Last 24 Hrs  T(C): 36.8 (02-10-20 @ 13:00), Max: 37.2 (02-09-20 @ 21:00)  T(F): 98.2 (02-10-20 @ 13:00), Max: 98.9 (02-09-20 @ 21:00)  HR: 67 (02-10-20 @ 13:00) (60 - 67)  BP: 110/65 (02-10-20 @ 13:00) (110/65 - 145/76)  RR: 18 (02-10-20 @ 13:00) (18 - 18)  SpO2: 99% (02-10-20 @ 13:00) (91% - 100%)    Respiratory: decreased BS at bases  Cardiovascular: S1 S2  Gastrointestinal: soft NT ND +BS  Extremities:  LUE wrapped ; b/l LE edema    acetaminophen   Tablet .. 975 milliGRAM(s) Oral every 6 hours PRN  allopurinol 100 milliGRAM(s) Oral daily  aMIOdarone    Tablet 200 milliGRAM(s) Oral daily  brimonidine 0.2% Ophthalmic Solution 1 Drop(s) Both EYES three times a day  chlorhexidine 2% Cloths 1 Application(s) Topical <User Schedule>  epoetin trey Injectable 94339 Unit(s) IV Push <User Schedule>  famotidine    Tablet 20 milliGRAM(s) Oral daily  gabapentin 100 milliGRAM(s) Oral every 12 hours  HYDROmorphone  Injectable 0.5 milliGRAM(s) IV Push every 6 hours PRN  latanoprost 0.005% Ophthalmic Solution 1 Drop(s) Both EYES daily  meropenem  IVPB 500 milliGRAM(s) IV Intermittent every 24 hours  metoprolol tartrate 25 milliGRAM(s) Oral every 12 hours  Nephro-candace 1 Tablet(s) Oral daily  nystatin    Suspension 745221 Unit(s) Oral four times a day  oxyCODONE    IR 10 milliGRAM(s) Oral every 6 hours PRN  oxyCODONE    IR 5 milliGRAM(s) Oral every 4 hours PRN  polyethylene glycol 3350 17 Gram(s) Oral daily  senna 2 Tablet(s) Oral at bedtime                                 8.7    13.68 )-----------( 739      ( 10 Feb 2020 06:38 )             27.2     10 Feb 2020 06:38    135    |  95     |  27     ----------------------------<  98     4.3     |  25     |  5.67     Ca    9.5        10 Feb 2020 06:38  Phos  4.0       10 Feb 2020 06:38  Mg     2.2       10 Feb 2020 06:38    TPro  7.1    /  Alb  2.8    /  TBili  1.4    /  DBili  0.6    /  AST  22     /  ALT  <5     /  AlkPhos  239    10 Feb 2020 06:38    LIVER FUNCTIONS - ( 10 Feb 2020 06:38 )  Alb: 2.8 g/dL / Pro: 7.1 g/dL / ALK PHOS: 239 U/L / ALT: <5 U/L / AST: 22 U/L / GGT: x           PT/INR - ( 10 Feb 2020 06:38 )   PT: 14.9 sec;   INR: 1.30 ratio      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, DGF  S/p transplant kidney bx 12/13: ATN, focal TMA  S/p PLEX 12/13, 12/15; d/c-d 12/20 w/op HD 3 x wk  Re-adm 12/27 w/Flu A, infected carlos-nephric hematoma   S/p OR 1/1 for washout and repeat renal graft biopsy (ATN)  Dx w/RLE DVT  Developed gross hematuria  S/p OR 1/10 for infected carlos-nephric hematoma, hemorrhage involving pseudo-aneurysm of anastomosis of renal artery to external iliac vein,   s/p transplant nephrectomy, s/p IVCF   S/p PEA arrest 1/13, + PE, R retroperitoneal collection  S/p IR drainage of abd collection  RUE DVT, AC  Abx per ID  AVF infiltration 2/1 w/hematoma extending to L chest wall while on Eliquis, s/p PRBCs  Vascular f/u noted, d/w pt at length  Pt still w/pain in LUE, HD tomorrow via temp HD catheter  He agrees to trial of AVF 2/13 pending clinical course    [phone]

## 2020-02-10 NOTE — PROGRESS NOTE ADULT - SUBJECTIVE AND OBJECTIVE BOX
[facility] - Melissa Klesey, Enrique, Maximino, Marin, Ravi Goodson  Office Number:  [phone]    Patient resting comfortably in bed in NAD.  Laying flat with no respiratory distress.  No complaints of chest pain, dyspnea, palpitations, PND, or orthopnea.    F/U for:  PE       MEDICATIONS  (STANDING):  allopurinol 100 milliGRAM(s) Oral daily  aMIOdarone    Tablet 200 milliGRAM(s) Oral daily  brimonidine 0.2% Ophthalmic Solution 1 Drop(s) Both EYES three times a day  chlorhexidine 2% Cloths 1 Application(s) Topical <User Schedule>  epoetin trey Injectable 65724 Unit(s) IV Push <User Schedule>  famotidine    Tablet 20 milliGRAM(s) Oral daily  gabapentin 100 milliGRAM(s) Oral every 12 hours  latanoprost 0.005% Ophthalmic Solution 1 Drop(s) Both EYES daily  meropenem  IVPB 500 milliGRAM(s) IV Intermittent every 24 hours  metoprolol tartrate 25 milliGRAM(s) Oral every 12 hours  micafungin IVPB 100 milliGRAM(s) IV Intermittent once  Nephro-candace 1 Tablet(s) Oral daily  polyethylene glycol 3350 17 Gram(s) Oral daily  senna 2 Tablet(s) Oral at bedtime    MEDICATIONS  (PRN):  acetaminophen   Tablet .. 975 milliGRAM(s) Oral every 6 hours PRN Mild Pain (1 - 3)  HYDROmorphone  Injectable 0.5 milliGRAM(s) IV Push every 6 hours PRN Breakthrough Pain  oxyCODONE    IR 10 milliGRAM(s) Oral every 6 hours PRN Severe Pain (7 - 10)  oxyCODONE    IR 5 milliGRAM(s) Oral every 4 hours PRN Moderate Pain (4 - 6)      Allergies    coconut (Anaphylaxis)  morphine (Pruritus; Rash)    Intolerances        Vital Signs Last 24 Hrs  T(C): 36.9 (10 Feb 2020 05:00), Max: 37.2 (09 Feb 2020 21:00)  T(F): 98.4 (10 Feb 2020 05:00), Max: 98.9 (09 Feb 2020 21:00)  HR: 60 (10 Feb 2020 05:00) (60 - 65)  BP: 145/76 (10 Feb 2020 05:00) (113/72 - 148/65)  BP(mean): --  RR: 18 (10 Feb 2020 05:00) (18 - 18)  SpO2: 95% (10 Feb 2020 05:00) (91% - 100%)    I&O's Summary    08 Feb 2020 07:01  -  09 Feb 2020 07:00  --------------------------------------------------------  IN: 1490 mL / OUT: 2900 mL / NET: -1410 mL    09 Feb 2020 07:01  -  10 Feb 2020 06:41  --------------------------------------------------------  IN: 520 mL / OUT: 50 mL / NET: 470 mL        ON EXAM:    Constitutional: well-nourished, well-developed, NAD   HEENT:  MMM, sclerae anicteric, conjunctivae clear, no oral cyanosis.  Pulmonary: Non-labored, breath sounds are clear ant , No wheezing, rales or rhonchi  Cardiovascular: Regular, S1 and S2.  No murmur.  No rubs, gallops or clicks  Gastrointestinal: Bowel Sounds present, soft, nontender.   Lymph: +++  peripheral edema.   Neurological: Alert, no focal deficits  Skin: No rashes.  Psych:  Mood & affect appropriate      LABS: All Labs Reviewed:                        8.0    15.99 )-----------( 653      ( 09 Feb 2020 06:39 )             25.9                         8.1    16.64 )-----------( 615      ( 08 Feb 2020 08:26 )             25.7     09 Feb 2020 06:39    135    |  95     |  19     ----------------------------<  95     4.0     |  27     |  4.49   08 Feb 2020 08:26    136    |  95     |  28     ----------------------------<  92     4.5     |  25     |  5.75     Ca    8.8        09 Feb 2020 06:39  Ca    8.9        08 Feb 2020 08:26  Phos  3.2       09 Feb 2020 06:39  Phos  3.6       08 Feb 2020 08:26  Mg     2.1       09 Feb 2020 06:39  Mg     2.1       08 Feb 2020 08:26    TPro  6.1    /  Alb  2.5    /  TBili  1.2    /  DBili  0.5    /  AST  21     /  ALT  <5     /  AlkPhos  219    09 Feb 2020 06:39  TPro  6.5    /  Alb  2.6    /  TBili  1.0    /  DBili  0.4    /  AST  19     /  ALT  <5     /  AlkPhos  228    08 Feb 2020 08:26    PT/INR - ( 09 Feb 2020 06:39 )   PT: 15.5 sec;   INR: 1.34 ratio         PTT - ( 09 Feb 2020 06:39 )  PTT:37.2 sec

## 2020-02-10 NOTE — PROGRESS NOTE ADULT - SUBJECTIVE AND OBJECTIVE BOX
S: Pt seen and examined.                MEDICATIONS  (STANDING):  allopurinol 100 milliGRAM(s) Oral daily  aMIOdarone    Tablet 200 milliGRAM(s) Oral daily  brimonidine 0.2% Ophthalmic Solution 1 Drop(s) Both EYES three times a day  chlorhexidine 2% Cloths 1 Application(s) Topical <User Schedule>  epoetin trey Injectable 50545 Unit(s) IV Push <User Schedule>  famotidine    Tablet 20 milliGRAM(s) Oral daily  gabapentin 100 milliGRAM(s) Oral every 12 hours  latanoprost 0.005% Ophthalmic Solution 1 Drop(s) Both EYES daily  meropenem  IVPB 500 milliGRAM(s) IV Intermittent every 24 hours  metoprolol tartrate 25 milliGRAM(s) Oral every 12 hours  micafungin IVPB 100 milliGRAM(s) IV Intermittent once  Nephro-candace 1 Tablet(s) Oral daily  polyethylene glycol 3350 17 Gram(s) Oral daily  senna 2 Tablet(s) Oral at bedtime    MEDICATIONS  (PRN):  acetaminophen   Tablet .. 975 milliGRAM(s) Oral every 6 hours PRN Mild Pain (1 - 3)  HYDROmorphone  Injectable 0.5 milliGRAM(s) IV Push every 6 hours PRN Breakthrough Pain  oxyCODONE    IR 10 milliGRAM(s) Oral every 6 hours PRN Severe Pain (7 - 10)  oxyCODONE    IR 5 milliGRAM(s) Oral every 4 hours PRN Moderate Pain (4 - 6)      LABS:                        8.7    13.68 )-----------( 739      ( 10 Feb 2020 06:38 )             27.2     02-10    135  |  95<L>  |  27<H>  ----------------------------<  98  4.3   |  25  |  5.67<H>    Ca    9.5      10 Feb 2020 06:38  Phos  4.0     02-10  Mg     2.2     02-10    TPro  7.1  /  Alb  2.8<L>  /  TBili  1.4<H>  /  DBili  0.6<H>  /  AST  22  /  ALT  <5<L>  /  AlkPhos  239<H>  02-10    PT/INR - ( 10 Feb 2020 06:38 )   PT: 14.9 sec;   INR: 1.30 ratio         PTT - ( 10 Feb 2020 06:38 )  PTT:39.3 sec        Vital Signs Last 24 Hrs  T(C): 36.9 (10 Feb 2020 09:00), Max: 37.2 (09 Feb 2020 21:00)  T(F): 98.5 (10 Feb 2020 09:00), Max: 98.9 (09 Feb 2020 21:00)  HR: 60 (10 Feb 2020 09:00) (60 - 63)  BP: 139/68 (10 Feb 2020 09:00) (113/72 - 145/76)  BP(mean): --  RR: 18 (10 Feb 2020 09:00) (18 - 18)  SpO2: 93% (10 Feb 2020 09:00) (91% - 100%)      I&O's Summary    09 Feb 2020 07:01  -  10 Feb 2020 07:00  --------------------------------------------------------  IN: 520 mL / OUT: 50 mL / NET: 470 mL      I&O's Detail    09 Feb 2020 07:01  -  10 Feb 2020 07:00  --------------------------------------------------------  IN:    Oral Fluid: 520 mL  Total IN: 520 mL    OUT:    Voided: 50 mL  Total OUT: 50 mL    Total NET: 470 mL      Physical Exam  Gen: resting in bed, NAD  Card: RRR, normotensive  Chest/Back: hematoma stable  Respiratory: breathing comfortably on RA  Ext: Left arm wrapped in ACE; palpable L radial pulse, +thrill, full ROM, neurovascular grossly intact; left upper arm wound with ss, covered with adaptic and wrapped in ACE; +pedal pulses  .  .

## 2020-02-10 NOTE — PROGRESS NOTE ADULT - SUBJECTIVE AND OBJECTIVE BOX
[facility] DIVISION OF KIDNEY DISEASES AND HYPERTENSION -- FOLLOW UP NOTE  --------------------------------------------------------------------------------  49 yr old man with ESRD s/p DDRT in Dec 2019 complicated by DGF due to ATN and early TMA thought to be due to prograf. Was on belatacept/MMF/Pred immunosuppression.   He was initially  admitted 6 weeks ago with influenza, course complicated by perinephric hematoma complicated by enterobacter infection requiring surgical exploration and wash out. Post op course complicated by DVT started on heparin drip, subsequent gross hematuria. He was taken to the OR and found to have dehiscence of the ureter-bladder anastomosis as well as vascular anastomosis. He underwent graft nephrectomy on 1/10/20 with bovine bio patch repair of the EIA. On 1/13/20 he had cardiac arrest due to large pulmonary embolism s/p successful resuscitation.   On 2/1 he had infiltration of his AV fistula with large hematoma. He is getting HD via right IJ temporary catheter.       24 hour events/subjective:  No major events overnight. No new complaints.   Awaiting placement of tunneled dialysis catheter today         Standing Inpatient Medications  allopurinol 100 milliGRAM(s) Oral daily  aMIOdarone    Tablet 200 milliGRAM(s) Oral daily  brimonidine 0.2% Ophthalmic Solution 1 Drop(s) Both EYES three times a day  chlorhexidine 2% Cloths 1 Application(s) Topical <User Schedule>  epoetin trey Injectable 60819 Unit(s) IV Push <User Schedule>  famotidine    Tablet 20 milliGRAM(s) Oral daily  gabapentin 100 milliGRAM(s) Oral every 12 hours  latanoprost 0.005% Ophthalmic Solution 1 Drop(s) Both EYES daily  meropenem  IVPB 500 milliGRAM(s) IV Intermittent every 24 hours  metoprolol tartrate 25 milliGRAM(s) Oral every 12 hours  micafungin IVPB 100 milliGRAM(s) IV Intermittent once  Nephro-candace 1 Tablet(s) Oral daily  polyethylene glycol 3350 17 Gram(s) Oral daily  senna 2 Tablet(s) Oral at bedtime    PRN Inpatient Medications  acetaminophen   Tablet .. 975 milliGRAM(s) Oral every 6 hours PRN  HYDROmorphone  Injectable 0.5 milliGRAM(s) IV Push every 6 hours PRN  oxyCODONE    IR 10 milliGRAM(s) Oral every 6 hours PRN  oxyCODONE    IR 5 milliGRAM(s) Oral every 4 hours PRN      VITALS/PHYSICAL EXAM  --------------------------------------------------------------------------------  T(C): 36.9 (02-10-20 @ 09:00), Max: 37.2 (02-09-20 @ 21:00)  HR: 60 (02-10-20 @ 09:00) (60 - 63)  BP: 139/68 (02-10-20 @ 09:00) (113/72 - 145/76)  RR: 18 (02-10-20 @ 09:00) (18 - 18)  SpO2: 93% (02-10-20 @ 09:00) (91% - 100%)      02-09-20 @ 07:01  -  02-10-20 @ 07:00  --------------------------------------------------------  IN: 520 mL / OUT: 50 mL / NET: 470 mL    02-10-20 @ 07:01  -  02-10-20 @ 11:25  --------------------------------------------------------  IN: 240 mL / OUT: 0 mL / NET: 240 mL      Physical Exam:  	Comfortably sitting in recliner, eating breakfast              Right IJ temporary dialysis catheter present               Lungs fair air entry b/l              RRR              Abdomen soft, non tender              B/l LE wrapped in ACE bandage, edema improving               LUE wrapped in ACE bandage, forearm AVF with + thrill, edema improving.               left post shoulder large hematoma - so increase in size, tender along the medial border area of skin tear, not tender over the lump              Alert and non focal       LABS/STUDIES  --------------------------------------------------------------------------------              8.7    13.68 >-----------<  739      [02-10-20 @ 06:38]              27.2     135  |  95  |  27  ----------------------------<  98      [02-10-20 @ 06:38]  4.3   |  25  |  5.67        Ca     9.5     [02-10-20 @ 06:38]      Mg     2.2     [02-10-20 @ 06:38]      Phos  4.0     [02-10-20 @ 06:38]    TPro  7.1  /  Alb  2.8  /  TBili  1.4  /  DBili  0.6  /  AST  22  /  ALT  <5  /  AlkPhos  239  [02-10-20 @ 06:38]    PT/INR: PT 14.9 , INR 1.30       [02-10-20 @ 06:38]  PTT: 39.3       [02-10-20 @ 06:38]      Creatinine Trend:  SCr 5.67 [02-10 @ 06:38]  SCr 4.49 [02-09 @ 06:39]  SCr 5.75 [02-08 @ 08:26]  SCr 4.33 [02-06 @ 22:42]  SCr 5.70 [02-06 @ 00:39]        Iron 23, TIBC 189, %sat 12      [01-27-20 @ 14:03]  HbA1c 5.2      [01-13-20 @ 22:59]    HBsAb 351.0      [01-11-20 @ 16:09]  HBsAg Nonreact      [01-11-20 @ 16:09]  HBcAb Nonreact      [01-11-20 @ 16:09]  HCV 0.09, Nonreact      [01-11-20 @ 16:09]

## 2020-02-10 NOTE — PROGRESS NOTE ADULT - SUBJECTIVE AND OBJECTIVE BOX
Follow Up:  infected hematoma    Interval History: no chills or fevers. no n/v/d. continued LUE pain.     REVIEW OF SYSTEMS  [  ] ROS unobtainable because:    [ x ] All other systems negative except as noted below    Constitutional:  [ ] fever [ ] chills  [ ] weight loss  [ ] weakness  Skin:  [ ] rash [ ] phlebitis	  Eyes: [ ] icterus [ ] pain  [ ] discharge	  ENMT: [ ] sore throat  [ ] thrush [ ] ulcers [ ] exudates  Respiratory: [ ] dyspnea [ ] hemoptysis [ ] cough [ ] sputum	  Cardiovascular:  [ ] chest pain [ ] palpitations [ ] edema	  Gastrointestinal:  [ ] nausea [ ] vomiting [ ] diarrhea [ ] constipation [x ] pain	  Genitourinary:  [ ] dysuria [ ] frequency [ ] hematuria [ ] discharge [ ] flank pain  [ ] incontinence  Musculoskeletal:  [ ] myalgias [ ] arthralgias [ ] arthritis  [ ] back pain +LUE edema  Neurological:  [ ] headache [ ] seizures  [ ] confusion/altered mental status    Allergies  coconut (Anaphylaxis)  morphine (Pruritus; Rash)        ANTIMICROBIALS:  meropenem  IVPB 500 every 24 hours  nystatin    Suspension 741109 four times a day      OTHER MEDS:  MEDICATIONS  (STANDING):  acetaminophen   Tablet .. 975 every 6 hours PRN  allopurinol 100 daily  aMIOdarone    Tablet 200 daily  epoetin trey Injectable 34611 <User Schedule>  famotidine    Tablet 20 daily  gabapentin 100 every 12 hours  HYDROmorphone  Injectable 0.5 every 6 hours PRN  metoprolol tartrate 25 every 12 hours  oxyCODONE    IR 10 every 6 hours PRN  oxyCODONE    IR 5 every 4 hours PRN  polyethylene glycol 3350 17 daily  senna 2 at bedtime      Vital Signs Last 24 Hrs  T(C): 36.6 (10 Feb 2020 17:00), Max: 37.2 (09 Feb 2020 21:00)  T(F): 97.8 (10 Feb 2020 17:00), Max: 98.9 (09 Feb 2020 21:00)  HR: 69 (10 Feb 2020 17:00) (60 - 69)  BP: 143/75 (10 Feb 2020 17:00) (110/65 - 145/76)  BP(mean): --  RR: 18 (10 Feb 2020 17:00) (18 - 18)  SpO2: 95% (10 Feb 2020 17:00) (91% - 99%)    PHYSICAL EXAMINATION:  General: Alert and Awake, NAD  HEENT: PERRL, EOMI  Neck: Supple  Chest: R Chest Shiley (No surrounding erythema, drainage or tenderness to palpation)  Cardiac: RRR, No M/R/G  Resp: CTAB, No Wh/Rh/Ra  Abdomen: +RLQ wound with overlying dressing, NBS, mild-moderate tenderness to palpation over RLQ  MSK: +LUE edema and pain at proximal arm. 1-2+ RLE edema. No Calf tenderness  Skin: No rashes or lesions. Skin is warm and dry to the touch.   Neuro: Alert and Awake. CN 2-12 Grossly intact. Moves all four extremities spontaneously.  Psych: Calm, Pleasant, Cooperative                          8.7    13.68 )-----------( 739      ( 10 Feb 2020 06:38 )             27.2       02-10    135  |  95<L>  |  27<H>  ----------------------------<  98  4.3   |  25  |  5.67<H>    Ca    9.5      10 Feb 2020 06:38  Phos  4.0     02-10  Mg     2.2     02-10    TPro  7.1  /  Alb  2.8<L>  /  TBili  1.4<H>  /  DBili  0.6<H>  /  AST  22  /  ALT  <5<L>  /  AlkPhos  239<H>  02-10          MICROBIOLOGY:  v  .Blood Blood  01-24-20   No growth at 5 days.  --  --      .Body Fluid RLQ Fluid Collection  01-24-20   No growth  --  --      .Blood Blood  01-24-20   No growth at 5 days.  --  --      .Body Fluid Abdominal Fluid  01-22-20   No growth at 5 days  --    polymorphonuclear leukocytes seen  Gram Negative Rods seen  by cytocentrifuge      .Blood Blood  01-21-20   No growth at 5 days.  --  --      .Blood Blood  01-21-20   No growth at 5 days.  --  --      .Sputum Sputum, trap  01-18-20   Normal Respiratory Gillian present  --    No polymorphonuclear leukocytes per low power field  Rare Squamous epithelial cells per low power field  Rare Gram positive cocci in pairs per oil power field      .Blood Blood  01-16-20   No growth at 5 days.  --  --      .Body Fluid Abdominal Fluid  01-15-20   No growth at 5 days  --    polymorphonuclear leukocytes seen  No organisms seen  by cytocentrifuge                RADIOLOGY:    <The imaging below has been reviewed and visualized by me independently. Findings as detailed in report below>    EXAM:  CT ANGIO UPR EXT (W)AW IC LT                EXAM:  CT ANGIO CHEST (W)AW IC                      PROCEDURE DATE:  02/01/2020    Large left upper extremity hematoma measuring 17.3 x 5.0 cm which extends into the left axilla with active arterial extravasation which persists on more delayed imaging.

## 2020-02-10 NOTE — PROGRESS NOTE ADULT - ASSESSMENT
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 RVP with Influenza A s/p Tamiflu treatment course. Now with infected carlos-transplant hematoma.    1/1: s/p Ex-Lap and washout of infected hematoma (Enterobacter from cultures)  1/6: s/p IR guided drainage attempt of hematoma on 1/6 (NGTD from cultures)  1/10: s/p transplant nephrectomy, repair of right external iliac artery with a bovine pericardial patch, repair of right external iliac vein and thrombectomy of right lower extremity veins (Surgical cultures with Enterobacter)  1/15: s/p IR guided drainage (220cc of hematoma)  1/22: s/p IR drain repositioning and upsizing (Gram stain with GNR but so far NGTD)    Some of the Enterobacter isolates R and S to Ertapenem  per core lab Ertapenem at borderline of Susceptible and Resistant breakpoint  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   Given concern for intravascular infection I would treat for 6 weeks from 1/10 nephrectomy (End Date: 2/20/20)  I would continue Meropenem while admitted and switch to Ciprofloxacin on discharge    PEA arrest and Torsades on 1/13  CT C/A/P (1/13) with Saddle PE, interval increase in size of RP fluid collection (infected hematoma)  CT C/A/P (1/21) with pulmonary infarcts and residual hematoma (decreased in size)    Infiltrated LUE AVF with hematoma formation and active extravasation  Underwent IR angiography 9/3 with dissection of feeder artery with resolution of the pseudoaneurysm.  s/p R Chest Shiley Placement  Leukocytosis improving and procalcitonin downtrending   No absolute ID contraindication for permacath placement.     Overall, Pre-procedural Evaluation, LUE Hematoma, Leukocytosis (improving), Infected Hematoma, Saddle pulmonary embolus, Renal Transplant Recipient    --No absolute ID contraindication for permacath placement.   --Continue Meropenem to 500 mg IV Q24H (anticipate switch to Cipro PO on discharge - End Date: 2/20/20)  --Continue Nystatin for fungal PPx  --Continue to follow CBC with diff  --Continue to follow temperature curve  --Management of Saddle PE and LUE hematoma per Transplant and vascular teams    I will continue to follow. Please feel free to contact me with any further questions.    Dada Jacobo M.D.  [facility] Division of Infectious Disease  8AM-5PM: Pager Number [phone]  After Hours (or if no response): Please contact the Infectious Diseases Office at [phone]     The above assessment and plan were discussed with transplant PA

## 2020-02-10 NOTE — PROGRESS NOTE ADULT - ASSESSMENT
49 year old male with ESRD on HD, now s/p ddrt on 12/8/2019 course complicated by TMA/profound ATN, who presents from a HD center with a fever and cough. He was initially admitted with influenza. He is s/p washout of infected collection and biopsy which revealed ATN.  He was found to have diffusely enlarging perinephric fluid collection.    While on telemetry, he had an episode of a wide complex tachycardia. It initially appears irregular, suggesting an atrial arrhythmia with aberrancy, though the remainder appears more like an episode of non-sustained VT.   s/p washout on 1/1/2020 with worsening sepsis and transfer to ICU, s/p IR drainage of perinephric collection on 1/6. 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 He underwent operative repair of right external iliac artery with pericardial patch, transplant nephrectomy, RLE thrombectomy, and IVC filter on 1/9.     s/p corrina ->tachy arrest with tdp, af rvr, now on amio, with large PE  He has now returned to the SICU with significant lue swelling and active arterial extravasation, with drop in blood counts. He is s/p angiogram with tiny pseudoaneurysm.    - remains in sr   - had developed malignant arrhythmias which seemed to start with bradycardia, leading to more tachycardia and bursts of VT and rapid AF.  The apparent af degenerated into torsades.  He was shocked and has been maintaining SR since. Arrhythmias were most likely triggered by the massive vte event, are unlikely to represent a primary cardiac issue  - cont amio at 200 daily. cont metoprolol   - repeat ekg daily to evaluate qtc, while on antifungals/amiodarone  - hold asa     - was on apixaban for his PE  - this has been held in the setting of significant arterial extravasation at his fistula.  Remains on hold for possible permacath early this week  - trend CBC. Transfuse to keep hb >8  - restart a/c when no contraindication  - echo with preserved/hyperdynamic lv, with RV failure consistent with acute RV overload.  Repeat echo noted, and seemingly with improved RV function  - remains with marked leg edema/vol ol. cont hd    - Further cardiac workup will depend on clinical course.  - will follow with you

## 2020-02-10 NOTE — PROGRESS NOTE ADULT - SUBJECTIVE AND OBJECTIVE BOX
Transplant Surgery - Multidisciplinary Rounds  --------------------------------------------------------------  R DDRT    Date:  12/8/19     Present:  Patient seen with multidisciplinary team including Transplant Surgeons: Dr. Be. Transplant Nephrologist: Dr. Tamayo. NP: Aislinn Hunt in am rounds and examined with Dr. Be. Disciplines not in attendance will be notified of the plan.     HPI: 49M PMHx of ESRD on HD  ( via LUE AVF), HTN, Gout, Glaucoma, NET of pancreas (s/p robotic distal panc/splenectomy at [place] 2015 by Dr. Young, followed by Dr. Raphael, [facility] Oncologist), RA with hand contractures. He underwent DDRT on 12/8/19. Post op course c/b DGF requiring HD, thrombocytopenia, elevated LDH and haptoglobin. Schistocytes  on peripheral smear concerning for TMA. Envarsus held. Received Belatacept 12/13. Started on PLEX (12/12, 12/15). Underwent renal bx on 12/13 c/w profound ATN.  Discharged home on 12/20/19 w/ outpatient HD.     Re-admitted 12/27 with influenza, completed treatement with Tamiflu on 12/31. Urine cx on admission grew Ent cloacae, was started on meropenem 12/31. Renal US on admission with increasing hematoma.   ·	OR 1/1 for ex-lap, hematoma evacuation, washout and renal bx. (c/w ATN)  OR cxs grew CRE and e coli. Post op course c/b:   ·	fevers and leukocytosis, CT a/p (1/4) with increased perinephric collection. Underwent IR guided collection 1/6   ·	New onset hematuria, martinez inserted 1/7, started CBI  ·	RLE edema, RLE doppler (1/8) with acute above the knee thrombus of R external iliac, common femoral and femoral veins, Acute calf vein thrombus affecting R soleal vein, Heparin gtt initiated   ·	AMS 1/9  (head CT neg), hyponatremic  ·	Significant hematuria and bleeding around the martinez. Angio 1/9 showed actively bleeding pseudoaneurysm at the anastomosis of the transplant renal artery with the recipient iliac artery. A stent was placed in the iliac artery to  control hemorrhage. IVC filter placed.   ·	Emergent OR 1/9-1/10 for graft nephrectomy;  ureter to bladder anastomosis was completely disrupted, repaired the R external iliac artery with bovine pericardial patch, repaired right external iliac vein, performed thrombectomy of RLE veins, Intra op received 3u PRBC.   ·	Extubated 1/11  ·	1/13 Saddle PE, with right heart strain and complicated by PEA arrest followed by Torsade, ROSC achieved after ACLS; re-intubated  ·	1/15 Renal US with 13.7 x 5.0 x 1.5 cm in RLQ transplant bed; s/p bedside IR guided drainage, pigtail in place. Fluid cx to date with NG  ·	1/21 CT C/A/P showing persistent collection, catheter at superior portion of collection  ·	1/22 IR pigtail drain repositioned and upsized to 12Fr catheter w/ increased output  ·	1/23 Transitioned to Eliquis from Heparin drip  ·	2/1  AVF infiltrated during HD w/ - arterial extrav/ bleeding to the left upper arm and chest noted on CTA   ·	2/3 Left upper cavity arteriography demonstrated a tiny pseudoaneurysm arising from a distal branch of the left deep brachial artery. The tiny feeder artery was dissected resulting in resolution of the pseudoaneurysm    Interval events:    - H/H stable  8.7/27.2.  PLT uptrending 739   - TMax 37.2 afebrile   WBC downtrending 13.68 from 16   On meropenem (12/31) and Micafungin (1/20. Fluc 1/5-1/20)   Blood cultures 1/16, 1/21, 1/24 Negative  - Still with 2-3+ LE edema, flank edema   - LUE edema with tenderness under axilla along rib cage, across left chest - improved. Skin tear under R axilla and along medial portion of RUE persist  -> tender.   - Notable swelling noted posterior to left axilla ->non-tender, firm to touch, Unchanged  - Elliquis and ASA held for possible permacath placement      cxs:   12/29: Urine Cx: Enterobacter Cloacae  1/1 OR Perinephric collection Cx:  Carbapenem resistant Ent Cloacae, E. Coli  1/1: R kidney abscess swab: Enterobacter Cloacae  1/1  OR tissue: Enterobacter Cloacae  1/5: Urine Cx: Enteropbacter Cloacae  1/5: Skin incision Cx (bedside): NG  1/6: Adominal fluid Cx from IR:  NG   1/7: Urine Cx:  NG  1/7: BCX2:  NG  1/10 retroperit hematoma - enerobacter   1/10 bladder hematoma - enterobacter  1/15 Drainage fluid - NGTD  1/16: BC X2: Negative  1/18: SPutum: Normal harvey  1/21: BC X2: negative  1/22: IR body fluid Cx: negative  1/24: body fluid fungal: ngtd  1/24: BC x2: negative    Potential Discharge date: pending clinical improvement       MEDICATIONS  (STANDING):  allopurinol 100 milliGRAM(s) Oral daily  aMIOdarone    Tablet 200 milliGRAM(s) Oral daily  brimonidine 0.2% Ophthalmic Solution 1 Drop(s) Both EYES three times a day  chlorhexidine 2% Cloths 1 Application(s) Topical <User Schedule>  epoetin trey Injectable 65660 Unit(s) IV Push <User Schedule>  famotidine    Tablet 20 milliGRAM(s) Oral daily  gabapentin 100 milliGRAM(s) Oral every 12 hours  latanoprost 0.005% Ophthalmic Solution 1 Drop(s) Both EYES daily  meropenem  IVPB 500 milliGRAM(s) IV Intermittent every 24 hours  metoprolol tartrate 25 milliGRAM(s) Oral every 12 hours  micafungin IVPB 100 milliGRAM(s) IV Intermittent once  Nephro-candace 1 Tablet(s) Oral daily  polyethylene glycol 3350 17 Gram(s) Oral daily  senna 2 Tablet(s) Oral at bedtime    MEDICATIONS  (PRN):  acetaminophen   Tablet .. 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  Carpal tunnel syndrome  Brain cyst: had MRI recently- now gone  Anemia  Gastric ulcer: Long time ago  Rheumatoid arthritis  Renal transplant recipient  Breakdown (mechanical) of penile (implanted) prosthesis, sequela  End-stage renal disease (ESRD): PERMACATH PLACEMENT  Abscess of left buttock: s/p I &amp; D  AV fistula: placement left lower arm  S/P cystoscopy: stent placement &amp; removal for kidney stones, lithiotripsy  s/p Lt Carpal tunnel: 2011      Vital Signs Last 24 Hrs  T(C): 36.9 (10 Feb 2020 09:00), Max: 37.2 (09 Feb 2020 21:00)  T(F): 98.5 (10 Feb 2020 09:00), Max: 98.9 (09 Feb 2020 21:00)  HR: 60 (10 Feb 2020 09:00) (60 - 63)  BP: 139/68 (10 Feb 2020 09:00) (113/72 - 145/76)  BP(mean): --  RR: 18 (10 Feb 2020 09:00) (18 - 18)  SpO2: 93% (10 Feb 2020 09:00) (91% - 100%)    I&O's Summary    09 Feb 2020 07:01  -  10 Feb 2020 07:00  --------------------------------------------------------  IN: 520 mL / OUT: 50 mL / NET: 470 mL    10 Feb 2020 07:01  -  10 Feb 2020 10:33  --------------------------------------------------------  IN: 240 mL / OUT: 0 mL / NET: 240 mL          LABS:                        8.7    13.68 )-----------( 739      ( 10 Feb 2020 06:38 )             27.2     02-10    135  |  95<L>  |  27<H>  ----------------------------<  98  4.3   |  25  |  5.67<H>    Ca    9.5      10 Feb 2020 06:38  Phos  4.0     02-10  Mg     2.2     02-10    TPro  7.1  /  Alb  2.8<L>  /  TBili  1.4<H>  /  DBili  0.6<H>  /  AST  22  /  ALT  <5<L>  /  AlkPhos  239<H>  02-10    PT/INR - ( 10 Feb 2020 06:38 )   PT: 14.9 sec;   INR: 1.30 ratio         PTT - ( 10 Feb 2020 06:38 )  PTT:39.3 sec    CAPILLARY BLOOD GLUCOSE        LIVER FUNCTIONS - ( 10 Feb 2020 06:38 )  Alb: 2.8 g/dL / Pro: 7.1 g/dL / ALK PHOS: 239 U/L / ALT: <5 U/L / AST: 22 U/L / GGT: x                 Cultures:        REVIEW OF SYSTEMS  Gen: + weakness   Skin: No rashes  Head/Eyes/Ears/Mouth: No headache; Normal hearing; Normal vision w/o blurriness; No sinus pain/discomfort, sore throat  Respiratory: no SOB  CV: No chest pain, PND, orthopnea  GI: no diarrhea, no constipation   : anuric  MSK: c/o LUE pain and tenderness but reports improvement.   Neuro: No dizziness/lightheadedness, weakness, seizures, numbness, tingling  Heme: No easy bruising or bleeding  Endo: No heat/cold intolerance  Psych: No significant nervousness, anxiety, stress, depression      PHYSICAL EXAM:    Constitutional: NAD  	Eyes: Anicteric, PERRLA  	Respiratory: CTA b/l  	Cardiovascular: RRR  	Gastrointestinal: Soft abdomen, appropriate incisional TTP, ND. Wound vac patent    Genitourinary:  anuric, significant scrotal edema     Extremities: LUE and left chest edema, improved.  Notable swelling posterior to left axilla, firm to touch, no tender. Skin tear noted on medial aspect LUE and in axilla - tender. 2-3+ BLE edema with ace wraps above knee. + flank edema.  Small superificial blister anterior R foot and left foot  	Vascular: DP signal bilateral, L AVF palpable  	Neurological: A&O x3.  	Skin: no new lesions  	Musculoskeletal: Moving all extremities Transplant Surgery - Multidisciplinary Rounds  --------------------------------------------------------------  R DDRT    Date:  12/8/19     Present:  Patient seen with multidisciplinary team including Transplant Surgeons: Dr. Be, Dr. Espinal, Dr. Velásquez. Transplant Nephrologist: Dr. Tamayo. NP: Sarah Barrios and MAEVE Montano and Transplant Resident Joel iRzvi in am rounds and examined with Dr. Be. Disciplines not in attendance will be notified of the plan.     HPI: 49M PMHx of ESRD on HD  ( via LUE AVF), HTN, Gout, Glaucoma, NET of pancreas (s/p robotic distal panc/splenectomy at [place] 2015 by Dr. Young, followed by Dr. Raphael, [facility] Oncologist), RA with hand contractures. He underwent DDRT on 12/8/19. Post op course c/b DGF requiring HD, thrombocytopenia, elevated LDH and haptoglobin. Schistocytes  on peripheral smear concerning for TMA. Envarsus held. Received Belatacept 12/13. Started on PLEX (12/12, 12/15). Underwent renal bx on 12/13 c/w profound ATN.  Discharged home on 12/20/19 w/ outpatient HD.     Re-admitted 12/27 with influenza, completed treatement with Tamiflu on 12/31. Urine cx on admission grew Ent cloacae, was started on meropenem 12/31. Renal US on admission with increasing hematoma.   ·	OR 1/1 for ex-lap, hematoma evacuation, washout and renal bx. (c/w ATN)  OR cxs grew CRE and e coli. Post op course c/b:   ·	fevers and leukocytosis, CT a/p (1/4) with increased perinephric collection. Underwent IR guided collection 1/6   ·	New onset hematuria, martinez inserted 1/7, started CBI  ·	RLE edema, RLE doppler (1/8) with acute above the knee thrombus of R external iliac, common femoral and femoral veins, Acute calf vein thrombus affecting R soleal vein, Heparin gtt initiated   ·	AMS 1/9  (head CT neg), hyponatremic  ·	Significant hematuria and bleeding around the martinez. Angio 1/9 showed actively bleeding pseudoaneurysm at the anastomosis of the transplant renal artery with the recipient iliac artery. A stent was placed in the iliac artery to  control hemorrhage. IVC filter placed.   ·	Emergent OR 1/9-1/10 for graft nephrectomy;  ureter to bladder anastomosis was completely disrupted, repaired the R external iliac artery with bovine pericardial patch, repaired right external iliac vein, performed thrombectomy of RLE veins, Intra op received 3u PRBC.   ·	Extubated 1/11  ·	1/13 Saddle PE, with right heart strain and complicated by PEA arrest followed by Torsade, ROSC achieved after ACLS; re-intubated  ·	1/15 Renal US with 13.7 x 5.0 x 1.5 cm in RLQ transplant bed; s/p bedside IR guided drainage, pigtail in place. Fluid cx to date with NG  ·	1/21 CT C/A/P showing persistent collection, catheter at superior portion of collection  ·	1/22 IR pigtail drain repositioned and upsized to 12Fr catheter w/ increased output  ·	1/23 Transitioned to Eliquis from Heparin drip  ·	2/1  AVF infiltrated during HD w/ - arterial extrav/ bleeding to the left upper arm and chest noted on CTA   ·	2/3 Left upper cavity arteriography demonstrated a tiny pseudoaneurysm arising from a distal branch of the left deep brachial artery. The tiny feeder artery was dissected resulting in resolution of the pseudoaneurysm    Interval events:    - H/H stable  8.7/27.2.  PLT uptrending 739   - TMax 37.2 afebrile   WBC downtrending 13.68 from 16   On meropenem (12/31) and Micafungin (1/20. Fluc 1/5-1/20)   Blood cultures 1/16, 1/21, 1/24 Negative  - Still with 2-3+ LE edema, flank edema   - LUE edema with tenderness under axilla along rib cage, across left chest - improved. Skin tear under R axilla and along medial portion of RUE persist  -> tender.   - Notable swelling noted posterior to left axilla ->non-tender, firm to touch, Unchanged  - Elliquis and ASA held for possible permacath placement      cxs:   12/29: Urine Cx: Enterobacter Cloacae  1/1 OR Perinephric collection Cx:  Carbapenem resistant Ent Cloacae, E. Coli  1/1: R kidney abscess swab: Enterobacter Cloacae  1/1  OR tissue: Enterobacter Cloacae  1/5: Urine Cx: Enteropbacter Cloacae  1/5: Skin incision Cx (bedside): NG  1/6: Adominal fluid Cx from IR:  NG   1/7: Urine Cx:  NG  1/7: BCX2:  NG  1/10 retroperit hematoma - enerobacter   1/10 bladder hematoma - enterobacter  1/15 Drainage fluid - NGTD  1/16: BC X2: Negative  1/18: SPutum: Normal harvey  1/21: BC X2: negative  1/22: IR body fluid Cx: negative  1/24: body fluid fungal: ngtd  1/24: BC x2: negative    Potential Discharge date: pending clinical improvement       MEDICATIONS  (STANDING):  allopurinol 100 milliGRAM(s) Oral daily  aMIOdarone    Tablet 200 milliGRAM(s) Oral daily  brimonidine 0.2% Ophthalmic Solution 1 Drop(s) Both EYES three times a day  chlorhexidine 2% Cloths 1 Application(s) Topical <User Schedule>  epoetin trey Injectable 80057 Unit(s) IV Push <User Schedule>  famotidine    Tablet 20 milliGRAM(s) Oral daily  gabapentin 100 milliGRAM(s) Oral every 12 hours  latanoprost 0.005% Ophthalmic Solution 1 Drop(s) Both EYES daily  meropenem  IVPB 500 milliGRAM(s) IV Intermittent every 24 hours  metoprolol tartrate 25 milliGRAM(s) Oral every 12 hours  micafungin IVPB 100 milliGRAM(s) IV Intermittent once  Nephro-candace 1 Tablet(s) Oral daily  polyethylene glycol 3350 17 Gram(s) Oral daily  senna 2 Tablet(s) Oral at bedtime    MEDICATIONS  (PRN):  acetaminophen   Tablet .. 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  Carpal tunnel syndrome  Brain cyst: had MRI recently- now gone  Anemia  Gastric ulcer: Long time ago  Rheumatoid arthritis  Renal transplant recipient  Breakdown (mechanical) of penile (implanted) prosthesis, sequela  End-stage renal disease (ESRD): PERMACATH PLACEMENT  Abscess of left buttock: s/p I &amp; D  AV fistula: placement left lower arm  S/P cystoscopy: stent placement &amp; removal for kidney stones, lithiotripsy  s/p Lt Carpal tunnel: 2011      Vital Signs Last 24 Hrs  T(C): 36.9 (10 Feb 2020 09:00), Max: 37.2 (09 Feb 2020 21:00)  T(F): 98.5 (10 Feb 2020 09:00), Max: 98.9 (09 Feb 2020 21:00)  HR: 60 (10 Feb 2020 09:00) (60 - 63)  BP: 139/68 (10 Feb 2020 09:00) (113/72 - 145/76)  BP(mean): --  RR: 18 (10 Feb 2020 09:00) (18 - 18)  SpO2: 93% (10 Feb 2020 09:00) (91% - 100%)    I&O's Summary    09 Feb 2020 07:01  -  10 Feb 2020 07:00  --------------------------------------------------------  IN: 520 mL / OUT: 50 mL / NET: 470 mL    10 Feb 2020 07:01  -  10 Feb 2020 10:33  --------------------------------------------------------  IN: 240 mL / OUT: 0 mL / NET: 240 mL          LABS:                        8.7    13.68 )-----------( 739      ( 10 Feb 2020 06:38 )             27.2     02-10    135  |  95<L>  |  27<H>  ----------------------------<  98  4.3   |  25  |  5.67<H>    Ca    9.5      10 Feb 2020 06:38  Phos  4.0     02-10  Mg     2.2     02-10    TPro  7.1  /  Alb  2.8<L>  /  TBili  1.4<H>  /  DBili  0.6<H>  /  AST  22  /  ALT  <5<L>  /  AlkPhos  239<H>  02-10    PT/INR - ( 10 Feb 2020 06:38 )   PT: 14.9 sec;   INR: 1.30 ratio         PTT - ( 10 Feb 2020 06:38 )  PTT:39.3 sec    CAPILLARY BLOOD GLUCOSE        LIVER FUNCTIONS - ( 10 Feb 2020 06:38 )  Alb: 2.8 g/dL / Pro: 7.1 g/dL / ALK PHOS: 239 U/L / ALT: <5 U/L / AST: 22 U/L / GGT: x                 Cultures:        REVIEW OF SYSTEMS  Gen: + weakness   Skin: No rashes  Head/Eyes/Ears/Mouth: No headache; Normal hearing; Normal vision w/o blurriness; No sinus pain/discomfort, sore throat  Respiratory: no SOB  CV: No chest pain, PND, orthopnea  GI: no diarrhea, no constipation   : anuric  MSK: c/o LUE pain and tenderness but reports improvement.   Neuro: No dizziness/lightheadedness, weakness, seizures, numbness, tingling  Heme: No easy bruising or bleeding  Endo: No heat/cold intolerance  Psych: No significant nervousness, anxiety, stress, depression      PHYSICAL EXAM:    Constitutional: NAD  	Eyes: Anicteric, PERRLA  	Respiratory: CTA b/l  	Cardiovascular: RRR  	Gastrointestinal: Soft abdomen, appropriate incisional TTP, ND. Wound vac patent    Genitourinary:  anuric, significant scrotal edema     Extremities: LUE and left chest edema, improved.  Notable swelling posterior to left axilla, firm to touch, no tender. Skin tear noted on medial aspect LUE and in axilla - tender. 2-3+ BLE edema with ace wraps above knee. + flank edema.  Small superificial blister anterior R foot and left foot  	Vascular: DP signal bilateral, L AVF palpable  	Neurological: A&O x3.  	Skin: no new lesions  	Musculoskeletal: Moving all extremities

## 2020-02-10 NOTE — PROGRESS NOTE ADULT - PROBLEM SELECTOR PLAN 1
s/p graft nephrectomy   Electrolytes acceptable, volume status difficult to assess due to extensive LE DVT with b/l edema but tolerating UF  with HD. Hemodynamically stable.   Continue HD 3 times a week per primary nephrologist.   Patient does not wish to use the AV fistula until his arm is completely healed   Scheduled for tunneled right IJ cathter by IR today. Keep NPO

## 2020-02-11 LAB
ALBUMIN SERPL ELPH-MCNC: 2.6 G/DL — LOW (ref 3.3–5)
ALP SERPL-CCNC: 237 U/L — HIGH (ref 40–120)
ALT FLD-CCNC: 5 U/L — LOW (ref 10–45)
ANION GAP SERPL CALC-SCNC: 15 MMOL/L — SIGNIFICANT CHANGE UP (ref 5–17)
APTT BLD: 32.4 SEC — SIGNIFICANT CHANGE UP (ref 27.5–36.3)
AST SERPL-CCNC: 22 U/L — SIGNIFICANT CHANGE UP (ref 10–40)
BILIRUB DIRECT SERPL-MCNC: 0.5 MG/DL — HIGH (ref 0–0.2)
BILIRUB INDIRECT FLD-MCNC: 0.7 MG/DL — SIGNIFICANT CHANGE UP (ref 0.2–1)
BILIRUB SERPL-MCNC: 1.2 MG/DL — SIGNIFICANT CHANGE UP (ref 0.2–1.2)
BLD GP AB SCN SERPL QL: NEGATIVE — SIGNIFICANT CHANGE UP
BUN SERPL-MCNC: 35 MG/DL — HIGH (ref 7–23)
CALCIUM SERPL-MCNC: 9.2 MG/DL — SIGNIFICANT CHANGE UP (ref 8.4–10.5)
CHLORIDE SERPL-SCNC: 93 MMOL/L — LOW (ref 96–108)
CO2 SERPL-SCNC: 24 MMOL/L — SIGNIFICANT CHANGE UP (ref 22–31)
CREAT SERPL-MCNC: 6.52 MG/DL — HIGH (ref 0.5–1.3)
GLUCOSE SERPL-MCNC: 98 MG/DL — SIGNIFICANT CHANGE UP (ref 70–99)
HCT VFR BLD CALC: 27.2 % — LOW (ref 39–50)
HGB BLD-MCNC: 8.4 G/DL — LOW (ref 13–17)
INR BLD: 1.21 RATIO — HIGH (ref 0.88–1.16)
MAGNESIUM SERPL-MCNC: 2.2 MG/DL — SIGNIFICANT CHANGE UP (ref 1.6–2.6)
MCHC RBC-ENTMCNC: 29.5 PG — SIGNIFICANT CHANGE UP (ref 27–34)
MCHC RBC-ENTMCNC: 30.9 GM/DL — LOW (ref 32–36)
MCV RBC AUTO: 95.4 FL — SIGNIFICANT CHANGE UP (ref 80–100)
NRBC # BLD: 0 /100 WBCS — SIGNIFICANT CHANGE UP (ref 0–0)
PHOSPHATE SERPL-MCNC: 4.4 MG/DL — SIGNIFICANT CHANGE UP (ref 2.5–4.5)
PLATELET # BLD AUTO: 776 K/UL — HIGH (ref 150–400)
POTASSIUM SERPL-MCNC: 5 MMOL/L — SIGNIFICANT CHANGE UP (ref 3.5–5.3)
POTASSIUM SERPL-SCNC: 5 MMOL/L — SIGNIFICANT CHANGE UP (ref 3.5–5.3)
PROCALCITONIN SERPL-MCNC: 0.8 NG/ML — HIGH (ref 0.02–0.1)
PROT SERPL-MCNC: 6.8 G/DL — SIGNIFICANT CHANGE UP (ref 6–8.3)
PROTHROM AB SERPL-ACNC: 14 SEC — HIGH (ref 10–12.9)
RBC # BLD: 2.85 M/UL — LOW (ref 4.2–5.8)
RBC # FLD: 15.5 % — HIGH (ref 10.3–14.5)
RH IG SCN BLD-IMP: POSITIVE — SIGNIFICANT CHANGE UP
SODIUM SERPL-SCNC: 132 MMOL/L — LOW (ref 135–145)
WBC # BLD: 13.34 K/UL — HIGH (ref 3.8–10.5)
WBC # FLD AUTO: 13.34 K/UL — HIGH (ref 3.8–10.5)

## 2020-02-11 PROCEDURE — 99232 SBSQ HOSP IP/OBS MODERATE 35: CPT

## 2020-02-11 PROCEDURE — 99232 SBSQ HOSP IP/OBS MODERATE 35: CPT | Mod: GC

## 2020-02-11 PROCEDURE — 36558 INSERT TUNNELED CV CATH: CPT | Mod: RT

## 2020-02-11 RX ADMIN — OXYCODONE HYDROCHLORIDE 10 MILLIGRAM(S): 5 TABLET ORAL at 06:38

## 2020-02-11 RX ADMIN — Medication 500000 UNIT(S): at 16:14

## 2020-02-11 RX ADMIN — BRIMONIDINE TARTRATE 1 DROP(S): 2 SOLUTION/ DROPS OPHTHALMIC at 22:21

## 2020-02-11 RX ADMIN — BRIMONIDINE TARTRATE 1 DROP(S): 2 SOLUTION/ DROPS OPHTHALMIC at 08:23

## 2020-02-11 RX ADMIN — OXYCODONE HYDROCHLORIDE 10 MILLIGRAM(S): 5 TABLET ORAL at 05:36

## 2020-02-11 RX ADMIN — Medication 500000 UNIT(S): at 00:58

## 2020-02-11 RX ADMIN — Medication 100 MILLIGRAM(S): at 16:13

## 2020-02-11 RX ADMIN — GABAPENTIN 100 MILLIGRAM(S): 400 CAPSULE ORAL at 05:35

## 2020-02-11 RX ADMIN — FAMOTIDINE 20 MILLIGRAM(S): 10 INJECTION INTRAVENOUS at 16:13

## 2020-02-11 RX ADMIN — Medication 1 TABLET(S): at 16:13

## 2020-02-11 RX ADMIN — BRIMONIDINE TARTRATE 1 DROP(S): 2 SOLUTION/ DROPS OPHTHALMIC at 16:13

## 2020-02-11 RX ADMIN — Medication 25 MILLIGRAM(S): at 05:35

## 2020-02-11 RX ADMIN — AMIODARONE HYDROCHLORIDE 200 MILLIGRAM(S): 400 TABLET ORAL at 05:35

## 2020-02-11 RX ADMIN — OXYCODONE HYDROCHLORIDE 10 MILLIGRAM(S): 5 TABLET ORAL at 20:44

## 2020-02-11 RX ADMIN — LATANOPROST 1 DROP(S): 0.05 SOLUTION/ DROPS OPHTHALMIC; TOPICAL at 16:14

## 2020-02-11 RX ADMIN — Medication 25 MILLIGRAM(S): at 22:21

## 2020-02-11 RX ADMIN — ERYTHROPOIETIN 10000 UNIT(S): 10000 INJECTION, SOLUTION INTRAVENOUS; SUBCUTANEOUS at 17:16

## 2020-02-11 RX ADMIN — GABAPENTIN 100 MILLIGRAM(S): 400 CAPSULE ORAL at 22:21

## 2020-02-11 RX ADMIN — CHLORHEXIDINE GLUCONATE 1 APPLICATION(S): 213 SOLUTION TOPICAL at 05:42

## 2020-02-11 RX ADMIN — OXYCODONE HYDROCHLORIDE 10 MILLIGRAM(S): 5 TABLET ORAL at 21:44

## 2020-02-11 RX ADMIN — Medication 500000 UNIT(S): at 05:35

## 2020-02-11 RX ADMIN — Medication 500000 UNIT(S): at 22:21

## 2020-02-11 RX ADMIN — MEROPENEM 100 MILLIGRAM(S): 1 INJECTION INTRAVENOUS at 22:21

## 2020-02-11 NOTE — PRE-ANESTHESIA EVALUATION ADULT - NSANTHPMHFT_GEN_ALL_CORE
chart and cv/renal/id/vasc notes reviewed. complicated hospital course including okt with subsequent rejection and removal, flu, multiple thromboses, vascular complications, and arrhythmia/arrest. per cv, current optimized for permacath, low likelihood of intrinsic conductivity dz. relatively unremarkable tte. no signs active cad/chf.

## 2020-02-11 NOTE — PROGRESS NOTE ADULT - ASSESSMENT
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 on 12/27 with cough and fever. RVP with Influenza A s/p Tamiflu treatment course. Now with infected carlos-transplant hematoma.    1/1: s/p Ex-Lap and washout of infected hematoma (Enterobacter from cultures)  1/6: s/p IR guided drainage attempt of hematoma on 1/6 (NGTD from cultures)  1/10: s/p transplant nephrectomy, repair of right external iliac artery with a bovine pericardial patch, repair of right external iliac vein and thrombectomy of right lower extremity veins (Surgical cultures with Enterobacter)  1/15: s/p IR guided drainage (220cc of hematoma)  1/22: s/p IR drain repositioning and upsizing (Gram stain with GNR but NGTD)    Some of the Enterobacter isolates R and S to Ertapenem  per core lab Ertapenem at borderline of Susceptible and Resistant breakpoint  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   Given concern for intravascular infection I would treat for 6 weeks from 1/10 nephrectomy (End Date: 2/20/20)  I would continue Meropenem while admitted and switch to Ciprofloxacin on discharge    PEA arrest and Torsades on 1/13  CT C/A/P (1/13) with Saddle PE, interval increase in size of RP fluid collection (infected hematoma)  CT C/A/P (1/21) with pulmonary infarcts and residual hematoma (decreased in size)    Infiltrated LUE AVF with hematoma formation and active extravasation  Underwent IR angiography 9/3 with dissection of feeder artery with resolution of the pseudoaneurysm.  s/p R Chest Shiley Placement  Leukocytosis improving and procalcitonin downtrending   No absolute ID contraindication for permacath placement.     Overall, Pre-procedural Evaluation, LUE Hematoma, Leukocytosis (improving), Infected Hematoma, Saddle pulmonary embolus, Renal Transplant Recipient    --No absolute ID contraindication for permacath placement.   --Continue Meropenem to 500 mg IV Q24H (anticipate switch to Cipro PO on discharge - End Date: 2/20/20)  --Continue Nystatin for fungal PPx  --Continue to follow CBC with diff  --Continue to follow temperature curve  --Management of Saddle PE and LUE hematoma per Transplant and vascular teams    I will continue to follow. Please feel free to contact me with any further questions.    Dada Jacobo M.D.  [facility] Division of Infectious Disease  8AM-5PM: Pager Number [phone]  After Hours (or if no response): Please contact the Infectious Diseases Office at [phone]     The above assessment and plan were discussed with transplant team

## 2020-02-11 NOTE — PROGRESS NOTE ADULT - SUBJECTIVE AND OBJECTIVE BOX
Transplant Surgery - Multidisciplinary Rounds  --------------------------------------------------------------  R DDRT    Date:  12/8/19     Present:  Patient seen with multidisciplinary team including Transplant Surgeons: Dr. Be, Dr. Espinal, Dr. Velásquez. Transplant Nephrologist: Dr. Tamayo. NP: Sarah Barrios and MAEVE Montano and Transplant Resident Joel Rizvi in am rounds and examined with Dr. Be. Disciplines not in attendance will be notified of the plan.     HPI: 49M PMHx of ESRD on HD  ( via LUE AVF), HTN, Gout, Glaucoma, NET of pancreas (s/p robotic distal panc/splenectomy at [place] 2015 by Dr. Young, followed by Dr. Raphael, [facility] Oncologist), RA with hand contractures. He underwent DDRT on 12/8/19. Post op course c/b DGF requiring HD, thrombocytopenia, elevated LDH and haptoglobin. Schistocytes  on peripheral smear concerning for TMA. Envarsus held. Received Belatacept 12/13. Started on PLEX (12/12, 12/15). Underwent renal bx on 12/13 c/w profound ATN.  Discharged home on 12/20/19 w/ outpatient HD.     Re-admitted 12/27 with influenza, completed treatement with Tamiflu on 12/31. Urine cx on admission grew Ent cloacae, was started on meropenem 12/31. Renal US on admission with increasing hematoma.   ·	OR 1/1 for ex-lap, hematoma evacuation, washout and renal bx. (c/w ATN)  OR cxs grew CRE and e coli. Post op course c/b:   ·	fevers and leukocytosis, CT a/p (1/4) with increased perinephric collection. Underwent IR guided collection 1/6   ·	New onset hematuria, martinez inserted 1/7, started CBI  ·	RLE edema, RLE doppler (1/8) with acute above the knee thrombus of R external iliac, common femoral and femoral veins, Acute calf vein thrombus affecting R soleal vein, Heparin gtt initiated   ·	AMS 1/9  (head CT neg), hyponatremic  ·	Significant hematuria and bleeding around the martinez. Angio 1/9 showed actively bleeding pseudoaneurysm at the anastomosis of the transplant renal artery with the recipient iliac artery. A stent was placed in the iliac artery to  control hemorrhage. IVC filter placed.   ·	Emergent OR 1/9-1/10 for graft nephrectomy;  ureter to bladder anastomosis was completely disrupted, repaired the R external iliac artery with bovine pericardial patch, repaired right external iliac vein, performed thrombectomy of RLE veins, Intra op received 3u PRBC.   ·	Extubated 1/11  ·	1/13 Saddle PE, with right heart strain and complicated by PEA arrest followed by Torsade, ROSC achieved after ACLS; re-intubated  ·	1/15 Renal US with 13.7 x 5.0 x 1.5 cm in RLQ transplant bed; s/p bedside IR guided drainage, pigtail in place. Fluid cx to date with NG  ·	1/21 CT C/A/P showing persistent collection, catheter at superior portion of collection  ·	1/22 IR pigtail drain repositioned and upsized to 12Fr catheter w/ increased output  ·	1/23 Transitioned to Eliquis from Heparin drip  ·	2/1  AVF infiltrated during HD w/ - arterial extrav/ bleeding to the left upper arm and chest noted on CTA   ·	2/3 Left upper cavity arteriography demonstrated a tiny pseudoaneurysm arising from a distal branch of the left deep brachial artery. The tiny feeder artery was dissected resulting in resolution of the pseudoaneurysm  ·	2/10 Wound VAc removed  ·	2/11 Permacath placed by IR    Interval events:    - H/H stable  8.4/27.2  PLT uptrending 776   - TMax 36.9 WBC downtrending 13.34 from 13.68   On meropenem (12/31) and Micafungin (1/20 - 2/10. Fluc 1/5-1/20)   Blood cultures 1/16, 1/21, 1/24 Negative  - Still with 2-3+ LE edema, flank edema   - LUE edema with tenderness under axilla along rib cage, across left chest - improved. Skin tear under L axilla and along medial portion of LUE persist  -> tender.   - Notable swelling noted posterior to left axilla ->non-tender, firm to touch, Unchanged  - Elliquis and ASA held for permacath placement today      cxs:   12/29: Urine Cx: Enterobacter Cloacae  1/1 OR Perinephric collection Cx:  Carbapenem resistant Ent Cloacae, E. Coli  1/1: R kidney abscess swab: Enterobacter Cloacae  1/1  OR tissue: Enterobacter Cloacae  1/5: Urine Cx: Enteropbacter Cloacae  1/5: Skin incision Cx (bedside): NG  1/6: Adominal fluid Cx from IR:  NG   1/7: Urine Cx:  NG  1/7: BCX2:  NG  1/10 retroperit hematoma - enerobacter   1/10 bladder hematoma - enterobacter  1/15 Drainage fluid - NGTD  1/16: BC X2: Negative  1/18: SPutum: Normal harvey  1/21: BC X2: negative  1/22: IR body fluid Cx: negative  1/24: body fluid fungal: ngtd  1/24: BC x2: negative    Potential Discharge date: pending clinical improvement    MEDICATIONS  (STANDING):  allopurinol 100 milliGRAM(s) Oral daily  aMIOdarone    Tablet 200 milliGRAM(s) Oral daily  brimonidine 0.2% Ophthalmic Solution 1 Drop(s) Both EYES three times a day  chlorhexidine 2% Cloths 1 Application(s) Topical <User Schedule>  epoetin trey Injectable 70027 Unit(s) IV Push <User Schedule>  famotidine    Tablet 20 milliGRAM(s) Oral daily  gabapentin 100 milliGRAM(s) Oral every 12 hours  latanoprost 0.005% Ophthalmic Solution 1 Drop(s) Both EYES daily  meropenem  IVPB 500 milliGRAM(s) IV Intermittent every 24 hours  metoprolol tartrate 25 milliGRAM(s) Oral every 12 hours  Nephro-candace 1 Tablet(s) Oral daily  nystatin    Suspension 928549 Unit(s) Oral four times a day  polyethylene glycol 3350 17 Gram(s) Oral daily  senna 2 Tablet(s) Oral at bedtime    MEDICATIONS  (PRN):  acetaminophen   Tablet .. 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  Carpal tunnel syndrome  Brain cyst: had MRI recently- now gone  Anemia  Gastric ulcer: Long time ago  Rheumatoid arthritis  Renal transplant recipient  Breakdown (mechanical) of penile (implanted) prosthesis, sequela  End-stage renal disease (ESRD): PERMACATH PLACEMENT  Abscess of left buttock: s/p I &amp; D  AV fistula: placement left lower arm  S/P cystoscopy: stent placement &amp; removal for kidney stones, lithiotripsy  s/p Lt Carpal tunnel: 2011      Vital Signs Last 24 Hrs  T(C): 36.7 (11 Feb 2020 12:04), Max: 36.9 (11 Feb 2020 01:00)  T(F): 98.1 (11 Feb 2020 09:10), Max: 98.5 (11 Feb 2020 01:00)  HR: 58 (11 Feb 2020 12:04) (56 - 71)  BP: 137/74 (11 Feb 2020 12:04) (132/85 - 147/70)  BP(mean): 100 (11 Feb 2020 12:04) (100 - 100)  RR: 18 (11 Feb 2020 12:04) (18 - 18)  SpO2: 99% (11 Feb 2020 12:04) (94% - 99%)    I&O's Summary    10 Feb 2020 07:01  -  11 Feb 2020 07:00  --------------------------------------------------------  IN: 360 mL / OUT: 0 mL / NET: 360 mL    11 Feb 2020 07:01  -  11 Feb 2020 14:24  --------------------------------------------------------  IN: 0 mL / OUT: 0 mL / NET: 0 mL                              8.4    13.34 )-----------( 776      ( 11 Feb 2020 05:55 )             27.2     02-11    132<L>  |  93<L>  |  35<H>  ----------------------------<  98  5.0   |  24  |  6.52<H>    Ca    9.2      11 Feb 2020 05:55  Phos  4.4     02-11  Mg     2.2     02-11    TPro  6.8  /  Alb  2.6<L>  /  TBili  1.2  /  DBili  0.5<H>  /  AST  22  /  ALT  5<L>  /  AlkPhos  237<H>  02-11      REVIEW OF SYSTEMS  Gen: + weakness, difficulty ambulating due to BLE edema  Skin: No rashes  Head/Eyes/Ears/Mouth: No headache; Normal hearing; Normal vision w/o blurriness; No sinus pain/discomfort, sore throat  Respiratory: no SOB  CV: No chest pain, PND, orthopnea  GI: no diarrhea, no constipation   : anuric  MSK: c/o LUE pain and tenderness but reports improvement.   Neuro: No dizziness/lightheadedness, weakness, seizures, numbness, tingling  Heme: No easy bruising or bleeding  Endo: No heat/cold intolerance  Psych: No significant nervousness, anxiety, stress, depression      PHYSICAL EXAM:    Constitutional: NAD  	Eyes: Anicteric, PERRLA  	Respiratory: CTA b/l  	Cardiovascular: RRR  	Gastrointestinal: Soft abdomen, appropriate incisional TTP, ND. RLQ wound covered with aquacell/DSD.    Genitourinary:  anuric, significant scrotal edema     Extremities: LUE and left chest edema, improved.  Notable swelling posterior to left axilla, firm to touch, no tender. Skin tear noted on medial aspect LUE and axilla - tender. 2-3+ BLE edema with ace wraps above knee. + flank edema.  Small superificial blister anterior R foot and L foot  	Vascular: DP signal bilateral, L AVF palpable  	Neurological: A&O x3.  	Skin: no new lesions  	Musculoskeletal: Moving all extremities

## 2020-02-11 NOTE — PROGRESS NOTE ADULT - SUBJECTIVE AND OBJECTIVE BOX
[facility] - Melissa Kelsey, Enrique, Maximino, Marin, Ravi Goodson  Office Number:  [phone]    Patient resting comfortably in bed in NAD.  Laying flat with no respiratory distress.  No complaints of chest pain, dyspnea, palpitations, PND, or orthopnea.    ROS: negative unless otherwise mentioned.    Telemetry:  sr    MEDICATIONS  (STANDING):  allopurinol 100 milliGRAM(s) Oral daily  aMIOdarone    Tablet 200 milliGRAM(s) Oral daily  brimonidine 0.2% Ophthalmic Solution 1 Drop(s) Both EYES three times a day  chlorhexidine 2% Cloths 1 Application(s) Topical <User Schedule>  epoetin trey Injectable 91030 Unit(s) IV Push <User Schedule>  famotidine    Tablet 20 milliGRAM(s) Oral daily  gabapentin 100 milliGRAM(s) Oral every 12 hours  latanoprost 0.005% Ophthalmic Solution 1 Drop(s) Both EYES daily  meropenem  IVPB 500 milliGRAM(s) IV Intermittent every 24 hours  metoprolol tartrate 25 milliGRAM(s) Oral every 12 hours  Nephro-candace 1 Tablet(s) Oral daily  nystatin    Suspension 004718 Unit(s) Oral four times a day  polyethylene glycol 3350 17 Gram(s) Oral daily  senna 2 Tablet(s) Oral at bedtime    MEDICATIONS  (PRN):  acetaminophen   Tablet .. 975 milliGRAM(s) Oral every 6 hours PRN Mild Pain (1 - 3)  HYDROmorphone  Injectable 0.5 milliGRAM(s) IV Push every 6 hours PRN Breakthrough Pain  oxyCODONE    IR 10 milliGRAM(s) Oral every 6 hours PRN Severe Pain (7 - 10)  oxyCODONE    IR 5 milliGRAM(s) Oral every 4 hours PRN Moderate Pain (4 - 6)      Allergies    coconut (Anaphylaxis)  morphine (Pruritus; Rash)    Intolerances        Vital Signs Last 24 Hrs  T(C): 36.9 (11 Feb 2020 05:00), Max: 36.9 (10 Feb 2020 09:00)  T(F): 98.4 (11 Feb 2020 05:00), Max: 98.5 (10 Feb 2020 09:00)  HR: 71 (11 Feb 2020 05:00) (56 - 71)  BP: 147/70 (11 Feb 2020 05:00) (110/65 - 147/70)  BP(mean): --  RR: 18 (11 Feb 2020 05:00) (18 - 18)  SpO2: 95% (11 Feb 2020 05:00) (93% - 99%)    I&O's Summary    10 Feb 2020 07:01  -  11 Feb 2020 07:00  --------------------------------------------------------  IN: 360 mL / OUT: 0 mL / NET: 360 mL        ON EXAM:  Constitutional: well-nourished, well-developed, NAD   HEENT:  MMM, sclerae anicteric, conjunctivae clear, no oral cyanosis.  Pulmonary: Non-labored, breath sounds are clear ant , No wheezing, rales or rhonchi  Cardiovascular: Regular, S1 and S2.  No murmur.  No rubs, gallops or clicks  Gastrointestinal: Bowel Sounds present, soft, nontender.   Lymph: +++  peripheral edema.   Neurological: Alert, no focal deficits  Skin: No rashes.  Psych:  Mood & affect appropriate    LABS: All Labs Reviewed:                        8.4    13.34 )-----------( 776      ( 11 Feb 2020 05:55 )             27.2                         8.7    13.68 )-----------( 739      ( 10 Feb 2020 06:38 )             27.2                         8.0    15.99 )-----------( 653      ( 09 Feb 2020 06:39 )             25.9     11 Feb 2020 05:55    132    |  93     |  35     ----------------------------<  98     5.0     |  24     |  6.52   10 Feb 2020 06:38    135    |  95     |  27     ----------------------------<  98     4.3     |  25     |  5.67   09 Feb 2020 06:39    135    |  95     |  19     ----------------------------<  95     4.0     |  27     |  4.49     Ca    9.2        11 Feb 2020 05:55  Ca    9.5        10 Feb 2020 06:38  Ca    8.8        09 Feb 2020 06:39  Phos  4.4       11 Feb 2020 05:55  Phos  4.0       10 Feb 2020 06:38  Phos  3.2       09 Feb 2020 06:39  Mg     2.2       11 Feb 2020 05:55  Mg     2.2       10 Feb 2020 06:38  Mg     2.1       09 Feb 2020 06:39    TPro  6.8    /  Alb  2.6    /  TBili  1.2    /  DBili  0.5    /  AST  22     /  ALT  5      /  AlkPhos  237    11 Feb 2020 05:55  TPro  7.1    /  Alb  2.8    /  TBili  1.4    /  DBili  0.6    /  AST  22     /  ALT  <5     /  AlkPhos  239    10 Feb 2020 06:38  TPro  6.1    /  Alb  2.5    /  TBili  1.2    /  DBili  0.5    /  AST  21     /  ALT  <5     /  AlkPhos  219    09 Feb 2020 06:39    PT/INR - ( 11 Feb 2020 05:58 )   PT: 14.0 sec;   INR: 1.21 ratio         PTT - ( 11 Feb 2020 05:58 )  PTT:32.4 sec      Blood Culture: During shift ([id number]) one wet diaper. Diaper weighted and notified MD Ferny Miranda. Fluids infusing at 80cc/hr.

## 2020-02-11 NOTE — PROGRESS NOTE ADULT - SUBJECTIVE AND OBJECTIVE BOX
Interventional Radiology  Pre-Procedure Note    49M PMHx of ESRD on HD  ( via LUE AVF), HTN, Gout, Glaucoma, NET of pancreas, RA with hand contractures. s/p DDRT 12/8/19,  Post op course c/b DGF requiring HD. Renal bx x 2 c/w profound ATN.     Pt had renal transplant on 12/8/19 and re-admitted on 12/27/19 with Influenza and perinephric hematoma. s/p hematoma evacuation/ abd washout and renal biopsy. Post op course c/b leukocytosis, infected hematoma, acute RLE DVT, and bleeding pseudoaneurysm at txp renal artery anastomosis. Now s/p graft nephrectomy. Course further complicated by saddle PE causing right heart strain and hemodynamic instability leading to cardiac arrest requiring ACLS,  Required IR drainage of  post-op collections x 2  now complicated by bleeding following cannulation of  LUE AVF with findings of a tiny pseudoaneurysm arising from a distal branch of the left deep brachial artery s/p dissection of a tiny feeder artery that resulted in resolution of the pseudoaneurysm.    RIJ non-tunnelled HD catheter placed 2/4/20 by Dr. Hinton, pt presents today for conversion to tunnelled HD catheter.      NPO since 2/10 at 2100  Last dose of Eliquis was 2/9 0600  Denies adverse reactions to anesthesia.      PAST MEDICAL & SURGICAL HISTORY:  Erectile dysfunction  Glaucoma  Gout  HTN (hypertension)  ESRD (end stage renal disease) on dialysis: since 7/2013 jarrett murry, sat  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  Carpal tunnel syndrome  Brain cyst: had MRI recently- now gone  Anemia  Gastric ulcer: Long time ago  Rheumatoid arthritis  Renal transplant recipient  Breakdown (mechanical) of penile (implanted) prosthesis, sequela  End-stage renal disease (ESRD): PERMACATH PLACEMENT  Abscess of left buttock: s/p I &amp; D  AV fistula: placement left lower arm  S/P cystoscopy: stent placement &amp; removal for kidney stones, lithiotripsy  s/p Lt Carpal tunnel: 2011      Social History:  social ETOH  smokes 1 cigarette/day  denies illicit drug use    Allergies: coconut (Anaphylaxis)  morphine (Pruritus; Rash)      Current Medications: acetaminophen   Tablet .. 975 milliGRAM(s) Oral every 6 hours PRN  allopurinol 100 milliGRAM(s) Oral daily  aMIOdarone    Tablet 200 milliGRAM(s) Oral daily  brimonidine 0.2% Ophthalmic Solution 1 Drop(s) Both EYES three times a day  chlorhexidine 2% Cloths 1 Application(s) Topical <User Schedule>  epoetin trey Injectable 51877 Unit(s) IV Push <User Schedule>  famotidine    Tablet 20 milliGRAM(s) Oral daily  gabapentin 100 milliGRAM(s) Oral every 12 hours  HYDROmorphone  Injectable 0.5 milliGRAM(s) IV Push every 6 hours PRN  latanoprost 0.005% Ophthalmic Solution 1 Drop(s) Both EYES daily  meropenem  IVPB 500 milliGRAM(s) IV Intermittent every 24 hours  metoprolol tartrate 25 milliGRAM(s) Oral every 12 hours  Nephro-candace 1 Tablet(s) Oral daily  nystatin    Suspension 823506 Unit(s) Oral four times a day  oxyCODONE    IR 10 milliGRAM(s) Oral every 6 hours PRN  oxyCODONE    IR 5 milliGRAM(s) Oral every 4 hours PRN  polyethylene glycol 3350 17 Gram(s) Oral daily  senna 2 Tablet(s) Oral at bedtime      Labs:                         8.4    13.34 )-----------( 776      ( 11 Feb 2020 05:55 )             27.2       02-11    132<L>  |  93<L>  |  35<H>  ----------------------------<  98  5.0   |  24  |  6.52<H>    Ca    9.2      11 Feb 2020 05:55  Phos  4.4     02-11  Mg     2.2     02-11    TPro  6.8  /  Alb  2.6<L>  /  TBili  1.2  /  DBili  0.5<H>  /  AST  22  /  ALT  5<L>  /  AlkPhos  237<H>  02-11    Blood Bank: 02-11-20  A  --  Positive    Blood Available Until:    Assessment/Plan:     This is a 49M PMHx of ESRD on HD  ( via LUE AVF), HTN, Gout, Glaucoma, NET of pancreas, RA with hand contractures. s/p DDRT 12/8/19,  Post op course c/b DGF requiring HD. Renal bx x 2 c/w profound ATN, now presents to IR for conversion of non-tunnelled to tunnelled HD catheter.  Procedure/ risks/ benefits/ goals/ alternatives were explained. All questions answered. Informed content obtained from patient. Consent placed in chart.

## 2020-02-11 NOTE — PROCEDURE NOTE - GENERAL PROCEDURE DETAILS
indwelling rt ij non tunneled dialysis catheter exchanged for tunneled dialysis catheter. tip in svc. ok to access.

## 2020-02-11 NOTE — PROGRESS NOTE ADULT - SUBJECTIVE AND OBJECTIVE BOX
Follow Up:  infected hematoma    Interval History: no chills or fevers. no n/v/d     REVIEW OF SYSTEMS  [  ] ROS unobtainable because:    [ x ] All other systems negative except as noted below    Constitutional:  [ ] fever [ ] chills  [ ] weight loss  [ ] weakness  Skin:  [ ] rash [ ] phlebitis	  Eyes: [ ] icterus [ ] pain  [ ] discharge	  ENMT: [ ] sore throat  [ ] thrush [ ] ulcers [ ] exudates  Respiratory: [ ] dyspnea [ ] hemoptysis [ ] cough [ ] sputum	  Cardiovascular:  [ ] chest pain [ ] palpitations [ ] edema	  Gastrointestinal:  [ ] nausea [ ] vomiting [ ] diarrhea [ ] constipation [x ] pain	  Genitourinary:  [ ] dysuria [ ] frequency [ ] hematuria [ ] discharge [ ] flank pain  [ ] incontinence  Musculoskeletal:  [ ] myalgias [ ] arthralgias [ ] arthritis  [ ] back pain +LUE edema  Neurological:  [ ] headache [ ] seizures  [ ] confusion/altered mental status      Allergies  coconut (Anaphylaxis)  morphine (Pruritus; Rash)        ANTIMICROBIALS:  meropenem  IVPB 500 every 24 hours  nystatin    Suspension 741191 four times a day      OTHER MEDS:  MEDICATIONS  (STANDING):  acetaminophen   Tablet .. 975 every 6 hours PRN  allopurinol 100 daily  aMIOdarone    Tablet 200 daily  epoetin trey Injectable 68179 <User Schedule>  famotidine    Tablet 20 daily  gabapentin 100 every 12 hours  HYDROmorphone  Injectable 0.5 every 6 hours PRN  metoprolol tartrate 25 every 12 hours  oxyCODONE    IR 10 every 6 hours PRN  oxyCODONE    IR 5 every 4 hours PRN  polyethylene glycol 3350 17 daily  senna 2 at bedtime      Vital Signs Last 24 Hrs  T(C): 36.8 (11 Feb 2020 15:54), Max: 36.9 (11 Feb 2020 01:00)  T(F): 98.2 (11 Feb 2020 15:54), Max: 98.5 (11 Feb 2020 01:00)  HR: 59 (11 Feb 2020 15:54) (56 - 71)  BP: 129/75 (11 Feb 2020 15:54) (129/75 - 147/70)  BP(mean): 100 (11 Feb 2020 12:04) (100 - 100)  RR: 18 (11 Feb 2020 15:54) (18 - 18)  SpO2: 96% (11 Feb 2020 15:54) (94% - 99%)    PHYSICAL EXAMINATION:  General: Alert and Awake, NAD  HEENT: PERRL, EOMI  Neck: Supple  Chest: R Chest Shiley (No surrounding erythema, drainage or tenderness to palpation)  Cardiac: RRR, No M/R/G  Resp: CTAB, No Wh/Rh/Ra  Abdomen: +RLQ wound with overlying dressing, NBS, mild-moderate tenderness to palpation over RLQ  MSK: +LUE edema and pain at proximal arm. 1-2+ RLE edema. No Calf tenderness  Skin: No rashes or lesions. Skin is warm and dry to the touch.   Neuro: Alert and Awake. CN 2-12 Grossly intact. Moves all four extremities spontaneously.  Psych: Calm, Pleasant, Cooperative                          8.4    13.34 )-----------( 776      ( 11 Feb 2020 05:55 )             27.2       02-11    132<L>  |  93<L>  |  35<H>  ----------------------------<  98  5.0   |  24  |  6.52<H>    Ca    9.2      11 Feb 2020 05:55  Phos  4.4     02-11  Mg     2.2     02-11    TPro  6.8  /  Alb  2.6<L>  /  TBili  1.2  /  DBili  0.5<H>  /  AST  22  /  ALT  5<L>  /  AlkPhos  237<H>  02-11          MICROBIOLOGY:  v  .Blood Blood  01-24-20   No growth at 5 days.  --  --      .Body Fluid RLQ Fluid Collection  01-24-20   No growth  --  --      .Blood Blood  01-24-20   No growth at 5 days.  --  --      .Body Fluid Abdominal Fluid  01-22-20   No growth at 5 days  --    polymorphonuclear leukocytes seen  Gram Negative Rods seen  by cytocentrifuge      .Blood Blood  01-21-20   No growth at 5 days.  --  --      .Blood Blood  01-21-20   No growth at 5 days.  --  --      .Sputum Sputum, trap  01-18-20   Normal Respiratory Gillian present  --    No polymorphonuclear leukocytes per low power field  Rare Squamous epithelial cells per low power field  Rare Gram positive cocci in pairs per oil power field      .Blood Blood  01-16-20   No growth at 5 days.  --  --      .Body Fluid Abdominal Fluid  01-15-20   No growth at 5 days  --    polymorphonuclear leukocytes seen  No organisms seen  by cytocentrifuge    RADIOLOGY:    <The imaging below has been reviewed and visualized by me independently. Findings as detailed in report below>    EXAM:  CT ANGIO UPR EXT (W)AW IC LT                EXAM:  CT ANGIO CHEST (W)AW IC                      PROCEDURE DATE:  02/01/2020    Large left upper extremity hematoma measuring 17.3 x 5.0 cm which extends into the left axilla with active arterial extravasation which persists on more delayed imaging.

## 2020-02-11 NOTE — PROGRESS NOTE ADULT - SUBJECTIVE AND OBJECTIVE BOX
[facility] NEPHROLOGY SERVICES, [facility]  NEPHROLOGY AND HYPERTENSION  [address]  [address]  [place] [address]  [phone]    MD GIANA GUERRERO, MD JANAE MCDONNELL, MD BARBARA MONET, MD SALUD GARZA, MD CATRINA WOO MD    No distress    Vital Signs Last 24 Hrs  T(C): 36.8 (02-11-20 @ 15:54), Max: 36.9 (02-11-20 @ 01:00)  T(F): 98.2 (02-11-20 @ 15:54), Max: 98.5 (02-11-20 @ 01:00)  HR: 59 (02-11-20 @ 15:54) (56 - 71)  BP: 129/75 (02-11-20 @ 15:54) (129/75 - 147/70)  BP(mean): 100 (02-11-20 @ 12:04) (100 - 100)  RR: 18 (02-11-20 @ 15:54) (18 - 18)  SpO2: 96% (02-11-20 @ 15:54) (94% - 99%)    Respiratory: decreased BS at bases  Cardiovascular: S1 S2  Gastrointestinal: soft NT ND +BS  Extremities:  LUE wrapped ; b/l LE edema    acetaminophen   Tablet .. 975 milliGRAM(s) Oral every 6 hours PRN  allopurinol 100 milliGRAM(s) Oral daily  aMIOdarone    Tablet 200 milliGRAM(s) Oral daily  brimonidine 0.2% Ophthalmic Solution 1 Drop(s) Both EYES three times a day  chlorhexidine 2% Cloths 1 Application(s) Topical <User Schedule>  epoetin trey Injectable 32745 Unit(s) IV Push <User Schedule>  famotidine    Tablet 20 milliGRAM(s) Oral daily  gabapentin 100 milliGRAM(s) Oral every 12 hours  HYDROmorphone  Injectable 0.5 milliGRAM(s) IV Push every 6 hours PRN  latanoprost 0.005% Ophthalmic Solution 1 Drop(s) Both EYES daily  meropenem  IVPB 500 milliGRAM(s) IV Intermittent every 24 hours  metoprolol tartrate 25 milliGRAM(s) Oral every 12 hours  Nephro-candace 1 Tablet(s) Oral daily  nystatin    Suspension 100939 Unit(s) Oral four times a day  oxyCODONE    IR 10 milliGRAM(s) Oral every 6 hours PRN  oxyCODONE    IR 5 milliGRAM(s) Oral every 4 hours PRN  polyethylene glycol 3350 17 Gram(s) Oral daily  senna 2 Tablet(s) Oral at bedtime                        8.4    13.34 )-----------( 776      ( 11 Feb 2020 05:55 )             27.2     11 Feb 2020 05:55    132    |  93     |  35     ----------------------------<  98     5.0     |  24     |  6.52     Ca    9.2        11 Feb 2020 05:55  Phos  4.4       11 Feb 2020 05:55  Mg     2.2       11 Feb 2020 05:55    TPro  6.8    /  Alb  2.6    /  TBili  1.2    /  DBili  0.5    /  AST  22     /  ALT  5      /  AlkPhos  237    11 Feb 2020 05:55    LIVER FUNCTIONS - ( 11 Feb 2020 05:55 )  Alb: 2.6 g/dL / Pro: 6.8 g/dL / ALK PHOS: 237 U/L / ALT: 5 U/L / AST: 22 U/L / GGT: x           PT/INR - ( 11 Feb 2020 05:58 )   PT: 14.0 sec;   INR: 1.21 ratio      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, DGF  S/p transplant kidney bx 12/13: ATN, focal TMA  S/p PLEX 12/13, 12/15; d/c-d 12/20 w/op HD 3 x wk  Re-adm 12/27 w/Flu A, infected carlos-nephric hematoma   S/p OR 1/1 for washout and repeat renal graft biopsy (ATN)  Dx w/RLE DVT  Developed gross hematuria  S/p OR 1/10 for infected carlos-nephric hematoma, hemorrhage involving pseudo-aneurysm of anastomosis of renal artery to external iliac vein,   s/p transplant nephrectomy, s/p IVCF   S/p PEA arrest 1/13, + PE, R retroperitoneal collection  S/p IR drainage of abd collection  RUE DVT, AC  Abx per ID  AVF infiltration 2/1 w/hematoma extending to L chest wall while on Eliquis, s/p PRBCs  Vascular f/u noted, d/w pt at length  Pt still w/pain in LUE, HD today via catheter  Possible trial of AVF 2/13 pending clinical course    [phone]

## 2020-02-11 NOTE — PROGRESS NOTE ADULT - ASSESSMENT
49M PMHx of ESRD on HD  ( via LUE AVF), HTN, Gout, Glaucoma, NET of pancreas, RA with hand contractures. s/p DDRT 12/8/19,  Post op course c/b DGF requiring HD. Renal bx x 2 c/w profound ATN.     Re-admitted with Influenza and perinephric hematoma. s/p hematoma evacuation/ abd washout and renal biopsy. Post op course c/b leukocytosis, infected hematoma, acute RLE DVT, and bleeding pseudoaneurysm at txp renal artery anastomosis. Now s/p graft nephrectomy. Course further complicated by saddle PE causing right heart strain and hemodynamic instability leading to cardiac arrest requiring ACLS,  Required IR drainage of  post-op collections x 2  now complicated by bleeding following cannulation of  LUE AVF with findings of a tiny pseudoaneurysm arising from a distal branch of the left deep brachial artery s/p dissection of a tiny feeder artery that resulted in resolution of the pseudoaneurysm.    [] DDRT c/b DGF/ATN/perinephric infected hematoma/pseudoneurysm/graft nephrectomy   - S/P AFV cannulation for HD c/b infiltration causing extravasation/bleeding to the left upper arm and chest noted on CTA  - S/P IR LUE arteriography showing a tiny pseudoaneurysm arising from a distal branch of the left deep brachial artery s/p dissection of a tiny feeder artery that resulted in resolution of the pseudoaneurysm.  - Daily CBC  - IR today for permacath placement   - Resume Eliquis 5mg BID and Aspirin 81mg daily  following procedure  - ID following;  Cont Meropenem and change to Cipro at time for discharge ->  End date 2/20.    - pain control  - bowel regimen  -on pepcid.   - PT/OT/OOB.    - RLQ Wound granulating. Aquacel dressing changes daily   - Thigh high compression stocking/ACE wrap to bilateral LE    - ACE wrap LUE from hand to shoulder.       [] Saddle PE, with right heart strain and complicated by PEA arrest followed by Torsade, ROSC achieved after ACLS  - vascular, CT and vascular cardiology following   -Repeat chest CT 1/28 noted Persistent bilateral lower lobe pulmonary emboli.   - CT 1/21: There are pulmonary emboli within the right lower lobar arteries and the left lower lobar arteries extending into the distal segments as seen on prior study. The pulmonary emboli seen in the right and left pulmonary arteries are no longer visualized. New distal to mid brachial DVT noted  - 1/19 Duplex: RUE Deep vein thrombosis in the mid to distal brachial vein.  - repeat ECHO with improved R heart strain  - Resume anticoagulation post- permacath placement    [] New Afib/Vtech  - Cardiology following, BB low dose in the setting of WCT and hold BB on HD days  - Continue amiodarone, card plan to continue for couple of months and will f/u as outpt    [] New acute DVT of R distal brachia vein , New acute DVT of R external iliac, common femoral, femoral veins, R soleal vein   - IVC filter placed 1/9. Eliquis to resume post-IR procedure    [] Thrombocytosis  -  ; were previously in 800s; cont to trend.    -Heme consulted; likely reactive.   - resume ASA 81mg post- permacath placement    [] Dispo  -discharge to acute rehab once medically stable - potentially later this week

## 2020-02-11 NOTE — PROGRESS NOTE ADULT - ASSESSMENT
49 year old male with ESRD on HD, now s/p ddrt on 12/8/2019 course complicated by TMA/profound ATN, who presents from a HD center with a fever and cough. He was initially admitted with influenza. He is s/p washout of infected collection and biopsy which revealed ATN.  He was found to have diffusely enlarging perinephric fluid collection.    While on telemetry, he had an episode of a wide complex tachycardia. It initially appears irregular, suggesting an atrial arrhythmia with aberrancy, though the remainder appears more like an episode of non-sustained VT.   s/p washout on 1/1/2020 with worsening sepsis and transfer to ICU, s/p IR drainage of perinephric collection on 1/6. 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 He underwent operative repair of right external iliac artery with pericardial patch, transplant nephrectomy, RLE thrombectomy, and IVC filter on 1/9.     s/p corrina ->tachy arrest with tdp, af rvr, now on amio, with large PE  He then returned to the SICU with significant lue swelling and active arterial extravasation, with drop in blood counts. He is s/p angiogram with tiny pseudoaneurysm.    - remains in sr   - had developed malignant arrhythmias which seemed to start with bradycardia, leading to more tachycardia and bursts of VT and rapid AF.  The apparent af degenerated into torsades.  He was shocked and has been maintaining SR since. Arrhythmias were most likely triggered by the massive vte event, are unlikely to represent a primary cardiac issue  - cont amio at 200 daily. cont metoprolol   - repeat ekg daily to evaluate qtc, while on antifungals/amiodarone  - hold asa     - was on apixaban for his PE  - this has been held in the setting of significant arterial extravasation at his fistula.  Remains on hold for possible permacath early this week  - trend CBC. Transfuse to keep hb >8  - restart a/c when no contraindication  - echo with preserved/hyperdynamic lv, with RV failure consistent with acute RV overload.  Repeat echo noted, and seemingly with improved RV function  - remains with marked leg edema/volume overload. cont hd for fluid removal.    - Further cardiac workup will depend on clinical course.  - will follow with you

## 2020-02-12 LAB
ALBUMIN SERPL ELPH-MCNC: 2.5 G/DL — LOW (ref 3.3–5)
ALP SERPL-CCNC: 212 U/L — HIGH (ref 40–120)
ALT FLD-CCNC: <5 U/L — LOW (ref 10–45)
ANION GAP SERPL CALC-SCNC: 15 MMOL/L — SIGNIFICANT CHANGE UP (ref 5–17)
APTT BLD: 35.5 SEC — SIGNIFICANT CHANGE UP (ref 27.5–36.3)
AST SERPL-CCNC: 19 U/L — SIGNIFICANT CHANGE UP (ref 10–40)
BILIRUB DIRECT SERPL-MCNC: 0.6 MG/DL — HIGH (ref 0–0.2)
BILIRUB INDIRECT FLD-MCNC: 0.7 MG/DL — SIGNIFICANT CHANGE UP (ref 0.2–1)
BILIRUB SERPL-MCNC: 1.3 MG/DL — HIGH (ref 0.2–1.2)
BUN SERPL-MCNC: 26 MG/DL — HIGH (ref 7–23)
CALCIUM SERPL-MCNC: 9.1 MG/DL — SIGNIFICANT CHANGE UP (ref 8.4–10.5)
CHLORIDE SERPL-SCNC: 93 MMOL/L — LOW (ref 96–108)
CO2 SERPL-SCNC: 25 MMOL/L — SIGNIFICANT CHANGE UP (ref 22–31)
CREAT SERPL-MCNC: 5.1 MG/DL — HIGH (ref 0.5–1.3)
GLUCOSE SERPL-MCNC: 74 MG/DL — SIGNIFICANT CHANGE UP (ref 70–99)
HCT VFR BLD CALC: 26.5 % — LOW (ref 39–50)
HGB BLD-MCNC: 8.4 G/DL — LOW (ref 13–17)
INR BLD: 1.25 RATIO — HIGH (ref 0.88–1.16)
MAGNESIUM SERPL-MCNC: 2.1 MG/DL — SIGNIFICANT CHANGE UP (ref 1.6–2.6)
MCHC RBC-ENTMCNC: 29.8 PG — SIGNIFICANT CHANGE UP (ref 27–34)
MCHC RBC-ENTMCNC: 31.7 GM/DL — LOW (ref 32–36)
MCV RBC AUTO: 94 FL — SIGNIFICANT CHANGE UP (ref 80–100)
NRBC # BLD: 0 /100 WBCS — SIGNIFICANT CHANGE UP (ref 0–0)
PHOSPHATE SERPL-MCNC: 4.1 MG/DL — SIGNIFICANT CHANGE UP (ref 2.5–4.5)
PLATELET # BLD AUTO: 677 K/UL — HIGH (ref 150–400)
POTASSIUM SERPL-MCNC: 4.4 MMOL/L — SIGNIFICANT CHANGE UP (ref 3.5–5.3)
POTASSIUM SERPL-SCNC: 4.4 MMOL/L — SIGNIFICANT CHANGE UP (ref 3.5–5.3)
PROCALCITONIN SERPL-MCNC: 0.88 NG/ML — HIGH (ref 0.02–0.1)
PROT SERPL-MCNC: 6.7 G/DL — SIGNIFICANT CHANGE UP (ref 6–8.3)
PROTHROM AB SERPL-ACNC: 14.3 SEC — HIGH (ref 10–12.9)
RBC # BLD: 2.82 M/UL — LOW (ref 4.2–5.8)
RBC # FLD: 15.4 % — HIGH (ref 10.3–14.5)
S PYO AG SPEC QL IA: NEGATIVE — SIGNIFICANT CHANGE UP
SODIUM SERPL-SCNC: 133 MMOL/L — LOW (ref 135–145)
WBC # BLD: 13.57 K/UL — HIGH (ref 3.8–10.5)
WBC # FLD AUTO: 13.57 K/UL — HIGH (ref 3.8–10.5)

## 2020-02-12 PROCEDURE — 99232 SBSQ HOSP IP/OBS MODERATE 35: CPT | Mod: GC

## 2020-02-12 PROCEDURE — 99231 SBSQ HOSP IP/OBS SF/LOW 25: CPT | Mod: GC

## 2020-02-12 PROCEDURE — 99232 SBSQ HOSP IP/OBS MODERATE 35: CPT

## 2020-02-12 RX ORDER — BENZOCAINE AND MENTHOL 5; 1 G/100ML; G/100ML
1 LIQUID ORAL ONCE
Refills: 0 | Status: COMPLETED | OUTPATIENT
Start: 2020-02-12 | End: 2020-02-12

## 2020-02-12 RX ORDER — BENZOCAINE AND MENTHOL 5; 1 G/100ML; G/100ML
1 LIQUID ORAL THREE TIMES A DAY
Refills: 0 | Status: DISCONTINUED | OUTPATIENT
Start: 2020-02-12 | End: 2020-02-14

## 2020-02-12 RX ORDER — APIXABAN 2.5 MG/1
5 TABLET, FILM COATED ORAL
Refills: 0 | Status: DISCONTINUED | OUTPATIENT
Start: 2020-02-12 | End: 2020-02-14

## 2020-02-12 RX ORDER — OXYCODONE HYDROCHLORIDE 5 MG/1
5 TABLET ORAL EVERY 4 HOURS
Refills: 0 | Status: DISCONTINUED | OUTPATIENT
Start: 2020-02-12 | End: 2020-02-14

## 2020-02-12 RX ORDER — HYDROMORPHONE HYDROCHLORIDE 2 MG/ML
0.5 INJECTION INTRAMUSCULAR; INTRAVENOUS; SUBCUTANEOUS EVERY 6 HOURS
Refills: 0 | Status: DISCONTINUED | OUTPATIENT
Start: 2020-02-12 | End: 2020-02-14

## 2020-02-12 RX ORDER — ASPIRIN/CALCIUM CARB/MAGNESIUM 324 MG
81 TABLET ORAL DAILY
Refills: 0 | Status: DISCONTINUED | OUTPATIENT
Start: 2020-02-12 | End: 2020-02-12

## 2020-02-12 RX ORDER — APIXABAN 2.5 MG/1
2.5 TABLET, FILM COATED ORAL
Refills: 0 | Status: DISCONTINUED | OUTPATIENT
Start: 2020-02-12 | End: 2020-02-12

## 2020-02-12 RX ADMIN — Medication 100 MILLIGRAM(S): at 12:48

## 2020-02-12 RX ADMIN — Medication 500000 UNIT(S): at 23:27

## 2020-02-12 RX ADMIN — LATANOPROST 1 DROP(S): 0.05 SOLUTION/ DROPS OPHTHALMIC; TOPICAL at 12:48

## 2020-02-12 RX ADMIN — OXYCODONE HYDROCHLORIDE 10 MILLIGRAM(S): 5 TABLET ORAL at 06:11

## 2020-02-12 RX ADMIN — OXYCODONE HYDROCHLORIDE 10 MILLIGRAM(S): 5 TABLET ORAL at 18:50

## 2020-02-12 RX ADMIN — SENNA PLUS 2 TABLET(S): 8.6 TABLET ORAL at 06:12

## 2020-02-12 RX ADMIN — Medication 500000 UNIT(S): at 12:48

## 2020-02-12 RX ADMIN — OXYCODONE HYDROCHLORIDE 10 MILLIGRAM(S): 5 TABLET ORAL at 18:20

## 2020-02-12 RX ADMIN — HYDROMORPHONE HYDROCHLORIDE 0.5 MILLIGRAM(S): 2 INJECTION INTRAMUSCULAR; INTRAVENOUS; SUBCUTANEOUS at 11:09

## 2020-02-12 RX ADMIN — Medication 25 MILLIGRAM(S): at 06:11

## 2020-02-12 RX ADMIN — CHLORHEXIDINE GLUCONATE 1 APPLICATION(S): 213 SOLUTION TOPICAL at 18:54

## 2020-02-12 RX ADMIN — OXYCODONE HYDROCHLORIDE 10 MILLIGRAM(S): 5 TABLET ORAL at 07:11

## 2020-02-12 RX ADMIN — BENZOCAINE AND MENTHOL 1 LOZENGE: 5; 1 LIQUID ORAL at 18:22

## 2020-02-12 RX ADMIN — HYDROMORPHONE HYDROCHLORIDE 0.5 MILLIGRAM(S): 2 INJECTION INTRAMUSCULAR; INTRAVENOUS; SUBCUTANEOUS at 10:39

## 2020-02-12 RX ADMIN — AMIODARONE HYDROCHLORIDE 200 MILLIGRAM(S): 400 TABLET ORAL at 06:11

## 2020-02-12 RX ADMIN — GABAPENTIN 100 MILLIGRAM(S): 400 CAPSULE ORAL at 18:20

## 2020-02-12 RX ADMIN — APIXABAN 5 MILLIGRAM(S): 2.5 TABLET, FILM COATED ORAL at 18:59

## 2020-02-12 RX ADMIN — FAMOTIDINE 20 MILLIGRAM(S): 10 INJECTION INTRAVENOUS at 12:48

## 2020-02-12 RX ADMIN — BRIMONIDINE TARTRATE 1 DROP(S): 2 SOLUTION/ DROPS OPHTHALMIC at 18:54

## 2020-02-12 RX ADMIN — MEROPENEM 100 MILLIGRAM(S): 1 INJECTION INTRAVENOUS at 12:45

## 2020-02-12 RX ADMIN — BENZOCAINE AND MENTHOL 1 LOZENGE: 5; 1 LIQUID ORAL at 23:28

## 2020-02-12 RX ADMIN — Medication 25 MILLIGRAM(S): at 18:20

## 2020-02-12 RX ADMIN — BRIMONIDINE TARTRATE 1 DROP(S): 2 SOLUTION/ DROPS OPHTHALMIC at 06:12

## 2020-02-12 RX ADMIN — GABAPENTIN 100 MILLIGRAM(S): 400 CAPSULE ORAL at 06:11

## 2020-02-12 RX ADMIN — Medication 500000 UNIT(S): at 06:11

## 2020-02-12 RX ADMIN — Medication 1 TABLET(S): at 12:48

## 2020-02-12 RX ADMIN — Medication 500000 UNIT(S): at 18:20

## 2020-02-12 NOTE — PROGRESS NOTE ADULT - ASSESSMENT
49M PMHx of ESRD on HD  ( via LUE AVF), HTN, Gout, Glaucoma, NET of pancreas, RA with hand contractures. s/p DDRT 12/8/19,  Post op course c/b DGF requiring HD. Renal bx x 2 c/w profound ATN.     Re-admitted with Influenza and perinephric hematoma. s/p hematoma evacuation/ abd washout and renal biopsy. Post op course c/b leukocytosis, infected hematoma, acute RLE DVT, and bleeding pseudoaneurysm at txp renal artery anastomosis. Now s/p graft nephrectomy. Course further complicated by saddle PE causing right heart strain and hemodynamic instability leading to cardiac arrest requiring ACLS,  Required IR drainage of  post-op collections x 2  now complicated by bleeding following cannulation of  LUE AVF with findings of a tiny pseudoaneurysm arising from a distal branch of the left deep brachial artery s/p dissection of a tiny feeder artery that resulted in resolution of the pseudoaneurysm.    [] DDRT c/b DGF/ATN/perinephric infected hematoma/pseudoneurysm/graft nephrectomy   - S/P AFV cannulation for HD c/b infiltration causing extravasation/bleeding to the left upper arm and chest noted on CTA  - S/P IR LUE arteriography showing a tiny pseudoaneurysm arising from a distal branch of the left deep brachial artery s/p dissection of a tiny feeder artery that resulted in resolution of the pseudoaneurysm.  - Daily CBC  - S/P IR permacath placement yesterday and had HD   - Resume Eliquis 5mg BID today and hold Aspirin    - ID following;  Cont Meropenem and change to Cipro at time for discharge ->  End date 2/20.    - pain control  - bowel regimen  -on pepcid.   - PT/OT/OOB.    - RLQ Wound granulating. Aquacel dressing changes daily   - Thigh high compression stocking/ACE wrap to bilateral LE    - ACE wrap LUE from hand to shoulder.       [] Saddle PE, with right heart strain and complicated by PEA arrest followed by Torsade, ROSC achieved after ACLS  - vascular, CT and vascular cardiology following   -Repeat chest CT 1/28 noted Persistent bilateral lower lobe pulmonary emboli.   - CT 1/21: There are pulmonary emboli within the right lower lobar arteries and the left lower lobar arteries extending into the distal segments as seen on prior study. The pulmonary emboli seen in the right and left pulmonary arteries are no longer visualized. New distal to mid brachial DVT noted  - 1/19 Duplex: RUE Deep vein thrombosis in the mid to distal brachial vein.  - repeat ECHO with improved R heart strain  - Restart eliquis 5mg BID today    [] New Afib/Vtech  - Cardiology following, BB low dose in the setting of WCT and hold BB on HD days  - Continue amiodarone, card plan to continue for couple of months and will f/u as outpt    [] New acute DVT of R distal brachia vein , New acute DVT of R external iliac, common femoral, femoral veins, R soleal vein   - IVC filter placed 1/9. Eliquis 5mg bid    [] Thrombocytosis  - [id number] ; were previously in 800s; cont to trend.    -Heme consulted; likely reactive.   - Hold ASA and start eliquis    [] Dispo  -discharge to acute rehab once bed is available

## 2020-02-12 NOTE — PROGRESS NOTE ADULT - SUBJECTIVE AND OBJECTIVE BOX
[facility] -- Melissa Kelsey, Maximino Nunez, Hamzah Funes Savella  Office # [phone]      Follow Up:  AF, arrest    Subjective/Observations: Patient seen and examined. Events noted. Resting comfortably in bed. No complaints of chest pain, dyspnea, or palpitations reported. No signs of orthopnea or PND.       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  Carpal tunnel syndrome  Brain cyst: had MRI recently- now gone  Anemia  Gastric ulcer: Long time ago  Rheumatoid arthritis  Renal transplant recipient  Breakdown (mechanical) of penile (implanted) prosthesis, sequela  End-stage renal disease (ESRD): PERMACATH PLACEMENT  Abscess of left buttock: s/p I &amp; D  AV fistula: placement left lower arm  S/P cystoscopy: stent placement &amp; removal for kidney stones, lithiotripsy  s/p Lt Carpal tunnel: 2011      MEDICATIONS  (STANDING):  allopurinol 100 milliGRAM(s) Oral daily  aMIOdarone    Tablet 200 milliGRAM(s) Oral daily  apixaban 5 milliGRAM(s) Oral two times a day  aspirin enteric coated 81 milliGRAM(s) Oral daily  brimonidine 0.2% Ophthalmic Solution 1 Drop(s) Both EYES three times a day  chlorhexidine 2% Cloths 1 Application(s) Topical <User Schedule>  epoetin trey Injectable 20869 Unit(s) IV Push <User Schedule>  famotidine    Tablet 20 milliGRAM(s) Oral daily  gabapentin 100 milliGRAM(s) Oral every 12 hours  latanoprost 0.005% Ophthalmic Solution 1 Drop(s) Both EYES daily  meropenem  IVPB 500 milliGRAM(s) IV Intermittent every 24 hours  metoprolol tartrate 25 milliGRAM(s) Oral every 12 hours  Nephro-candace 1 Tablet(s) Oral daily  nystatin    Suspension 428152 Unit(s) Oral four times a day  polyethylene glycol 3350 17 Gram(s) Oral daily  senna 2 Tablet(s) Oral at bedtime    MEDICATIONS  (PRN):  acetaminophen   Tablet .. 975 milliGRAM(s) Oral every 6 hours PRN Mild Pain (1 - 3)  HYDROmorphone  Injectable 0.5 milliGRAM(s) IV Push every 6 hours PRN Breakthrough Pain  oxyCODONE    IR 10 milliGRAM(s) Oral every 6 hours PRN Severe Pain (7 - 10)  oxyCODONE    IR 5 milliGRAM(s) Oral every 4 hours PRN Moderate Pain (4 - 6)      Allergies    coconut (Anaphylaxis)  morphine (Pruritus; Rash)    Intolerances            Vital Signs Last 24 Hrs  T(C): 37 (12 Feb 2020 05:00), Max: 37 (12 Feb 2020 05:00)  T(F): 98.6 (12 Feb 2020 05:00), Max: 98.6 (12 Feb 2020 05:00)  HR: 65 (12 Feb 2020 06:10) (56 - 79)  BP: 160/76 (12 Feb 2020 06:10) (120/80 - 164/89)  BP(mean): 100 (11 Feb 2020 12:04) (100 - 100)  RR: 18 (12 Feb 2020 05:00) (18 - 20)  SpO2: 95% (12 Feb 2020 05:00) (94% - 99%)    I&O's Summary    11 Feb 2020 07:01  -  12 Feb 2020 07:00  --------------------------------------------------------  IN: 720 mL / OUT: 0 mL / NET: 720 mL      Weight (kg): 63.5 (02-11 @ 12:04)    PHYSICAL EXAM:  TELE: SR  Constitutional: NAD, awake    HEENT: Moist Mucous Membranes, Anicteric  Pulmonary: Decreased breath sounds b/l. No rales, crackles or wheeze appreciated.   Cardiovascular: Regular, S1 and S2, 2/6 SM  Gastrointestinal: Bowel Sounds present, soft, nontender.   Lymph: + peripheral edema. No lymphadenopathy.  Skin: No visible rashes or ulcers.  Psych:  Mood & affect appropriate for situation    LABS: All Labs Reviewed:                        8.4    13.57 )-----------( 677      ( 12 Feb 2020 05:47 )             26.5                         8.4    13.34 )-----------( 776      ( 11 Feb 2020 05:55 )             27.2                         8.7    13.68 )-----------( 739      ( 10 Feb 2020 06:38 )             27.2     12 Feb 2020 05:47    133    |  93     |  26     ----------------------------<  74     4.4     |  25     |  5.10   11 Feb 2020 05:55    132    |  93     |  35     ----------------------------<  98     5.0     |  24     |  6.52   10 Feb 2020 06:38    135    |  95     |  27     ----------------------------<  98     4.3     |  25     |  5.67     Ca    9.1        12 Feb 2020 05:47  Ca    9.2        11 Feb 2020 05:55  Ca    9.5        10 Feb 2020 06:38  Phos  4.1       12 Feb 2020 05:47  Phos  4.4       11 Feb 2020 05:55  Phos  4.0       10 Feb 2020 06:38  Mg     2.1       12 Feb 2020 05:47  Mg     2.2       11 Feb 2020 05:55  Mg     2.2       10 Feb 2020 06:38    TPro  6.7    /  Alb  2.5    /  TBili  1.3    /  DBili  0.6    /  AST  19     /  ALT  <5     /  AlkPhos  212    12 Feb 2020 05:47  TPro  6.8    /  Alb  2.6    /  TBili  1.2    /  DBili  0.5    /  AST  22     /  ALT  5      /  AlkPhos  237    11 Feb 2020 05:55  TPro  7.1    /  Alb  2.8    /  TBili  1.4    /  DBili  0.6    /  AST  22     /  ALT  <5     /  AlkPhos  239    10 Feb 2020 06:38    PT/INR - ( 12 Feb 2020 05:47 )   PT: 14.3 sec;   INR: 1.25 ratio         PTT - ( 12 Feb 2020 05:47 )  PTT:35.5 sec

## 2020-02-12 NOTE — PROGRESS NOTE ADULT - SUBJECTIVE AND OBJECTIVE BOX
Hematology Oncology Follow-up    INTERVAL HPI/OVERNIGHT EVENTS: Denies any overnight events; continues to endorse difficulty with left arm motor movement (arm covered in dressings and compression bandages). Denies any fevers, chills, nausea, vomiting.    Review of Systems:  General: denies fevers/chills, night sweats, malaise, changes in appetite  Head: denies HA  Eyes: denies vision change  ENT: denies oral lesions, rhinorrhea, epistaxes, sore throat, dysphagia  Respiratory: denies cough, shortness of breath, pleurisy  Cardiovascular: denies chest pain, palpitations, GRAY  Gastrointestinal: denies nausea, vomiting, abdominal pain, constipation, diarrhea, melena, hematochezia  MSK: left arm and bilateral lower leg pain, stable  Neuro: denies headaches; left arm weakness  Skin: denies rash, petechiae, ecchymoses  Psych: denies anxiety or sleep disturbances    VITAL SIGNS:  T(F): 97.8 (02-12-20 @ 12:45)  HR: 61 (02-12-20 @ 12:45)  BP: 162/76 (02-12-20 @ 12:45)  RR: 18 (02-12-20 @ 12:45)  SpO2: 100% (02-12-20 @ 12:45)  Wt(kg): --    02-11-20 @ 07:01  -  02-12-20 @ 07:00  --------------------------------------------------------  IN: 720 mL / OUT: 0 mL / NET: 720 mL        PHYSICAL EXAM:    Constitutional: AAOx3, NAD; eating breakfast comfortably  Eyes: PERRL, EOMI, sclera non-icteric  Neck: supple, no masses, no JVD, no lymphadenopathy; R IJ cathete in place  Respiratory: CTA b/l, no wheezing, rhonchi, rales, with normal respiratory effort  Cardiovascular: RRR, normal S1S2, no M/R/G  Gastrointestinal: soft, NTND, no masses palpable, BS normal in all four quadrants, no HSM  Extremities: bilateral LE ACE bandage; LUE extremity wrapped in ACE bandage  MSK: no obvious abnormalities, normal ROM, no lymphadenopathy  Neurological: Grossly intact  Skin: Normal temperature, no rash, no ecchymoses, no petechiae  Psych: normal affect    MEDICATIONS  (STANDING):  allopurinol 100 milliGRAM(s) Oral daily  aMIOdarone    Tablet 200 milliGRAM(s) Oral daily  apixaban 5 milliGRAM(s) Oral two times a day  benzocaine 15 mG/menthol 3.6 mG (Sugar-Free) Lozenge 1 Lozenge Oral once  brimonidine 0.2% Ophthalmic Solution 1 Drop(s) Both EYES three times a day  chlorhexidine 2% Cloths 1 Application(s) Topical <User Schedule>  epoetin trey Injectable 58016 Unit(s) IV Push <User Schedule>  famotidine    Tablet 20 milliGRAM(s) Oral daily  gabapentin 100 milliGRAM(s) Oral every 12 hours  latanoprost 0.005% Ophthalmic Solution 1 Drop(s) Both EYES daily  meropenem  IVPB 500 milliGRAM(s) IV Intermittent every 24 hours  metoprolol tartrate 25 milliGRAM(s) Oral every 12 hours  Nephro-candace 1 Tablet(s) Oral daily  nystatin    Suspension 982496 Unit(s) Oral four times a day  polyethylene glycol 3350 17 Gram(s) Oral daily  senna 2 Tablet(s) Oral at bedtime    MEDICATIONS  (PRN):  acetaminophen   Tablet .. 975 milliGRAM(s) Oral every 6 hours PRN Mild Pain (1 - 3)  HYDROmorphone  Injectable 0.5 milliGRAM(s) IV Push every 6 hours PRN Breakthrough Pain  oxyCODONE    IR 10 milliGRAM(s) Oral every 6 hours PRN Severe Pain (7 - 10)  oxyCODONE    IR 5 milliGRAM(s) Oral every 4 hours PRN Moderate Pain (4 - 6)      coconut (Anaphylaxis)  morphine (Pruritus; Rash)      LABS:                        8.4    13.57 )-----------( 677      ( 12 Feb 2020 05:47 )             26.5     02-12    133<L>  |  93<L>  |  26<H>  ----------------------------<  74  4.4   |  25  |  5.10<H>    Ca    9.1      12 Feb 2020 05:47  Phos  4.1     02-12  Mg     2.1     02-12    TPro  6.7  /  Alb  2.5<L>  /  TBili  1.3<H>  /  DBili  0.6<H>  /  AST  19  /  ALT  <5<L>  /  AlkPhos  212<H>  02-12    PT/INR - ( 12 Feb 2020 05:47 )   PT: 14.3 sec;   INR: 1.25 ratio         PTT - ( 12 Feb 2020 05:47 )  PTT:35.5 sec     Bilirubin Direct, Serum: 0.6 <H> (02-12-20 @ 05:47)    Lupus Profile (02.04.20 @ 10:41)    DRVVT 50/50: 38.9 sec    DRVVT Inhibitor Screen: 51.0: The presence of direct thrombin inhibitors (such as argatroban,  refludan), or direct  Xa inhibitors (such as fondaparinux)  may cause  false positive results. sec    DRVVT S/C Ratio: LA NEG    Silica Clotting Time S/C Ratio: 0.63 Ratio    Silica Clotting Time Interpretation: LA NEG: NOTE: The presence of direct thrombin inhibitors (such as Argatroban,  Refludan), UF Heparin >0.5 U/ml or LMW Heparin >1.0 U/ml may affect  Results.      Iron with Total Binding Capacity (01.27.20 @ 14:03)    % Saturation, Iron: 12 %    Iron - Total Binding Capacity.: 189 ug/dL    Iron Total, Serum: 23 ug/dL    Unsaturated Iron Binding Capacity: 166 ug/dL    Vitamin B12, Serum: 968 pg/mL (01.27.20 @ 13:57)    Folate, Serum: 17.2 ng/mL (01.27.20 @ 13:57)    Lactate Dehydrogenase, Serum: 273 U/L (01.27.20 @ 06:39)    Haptoglobin, Serum: 181 mg/dL (01.27.20 @ 14:03)                RADIOLOGY & ADDITIONAL TESTS:  Studies reviewed.    < from: IR Procedure (02.11.20 @ 14:35) >    EXAM:  IR PROCEDURE NON PICC                                PROCEDURE DATE:  02/11/2020          INTERPRETATION:    Conversion of a temporary dialysis catheter to tunneled dialysis catheter    History: Patient with end-stage renal disease status post placement of a right internal jugular temporary dialysis catheter. Patient presents for conversion to a tunneled dialysis catheter.     Technique:    After obtaining an informed consent, the patient was placed supine on the angiosuite table and connected to physiologic monitoring.  Intravenous sedation was provided by the anesthesiologist. The indwelling right temporary dialysis catheter and upper chest were prepped and draped in the usual sterile fashion.  1% lidocaine was injected subcutaneously for local anesthesia.    A small dermatotomy was made in the right upper chest and a 19 cm tip to cuff dialysis catheter was tunneled from the dermatotomy to the puncture site. The temporary dialysis catheter was exchanged for a peel-away sheath over an Amplatz wire.  The wire and inner dilator were removed and the catheter was advanced through the peel-away sheath into the right internal jugular vein.  The peel-away sheath was entirely removed and the final position of the tip of the tunneleddialysis catheter was in the distal SVC. Both ports were able to be flushed and aspirate freely. Each port was locked with heparin. The puncture site was closed using Steri-Strips and the catheter was secured to the skin using 2-0 Prolene.  Dry sterile dressing was applied.    Impression:    Successful conversion of a right internal jugular temporary dialysis catheter to a tunneled dialysis catheter as described above.    DEEPA HESS M.D., ATTENDING RADIOLOGIST  This document hasbeen electronically signed. Feb 12 2020 11:41AM    < end of copied text >

## 2020-02-12 NOTE — PROGRESS NOTE ADULT - ASSESSMENT
49M PMHx of ESRD on HD  (via LUE AVF), HTN, Gout, Glaucoma, NET of pancreas, RA with hand contractures. s/p DDRT 12/8/19,  Post op course c/b DGF requiring HD. Renal bx x 2 c/w profound ATN. Re-admitted with Influenza and perinephric hematoma, s/p hematoma evacuation/ abd washout and renal biopsy. Post op course c/b leukocytosis, infected hematoma, acute RLE DVT, and bleeding pseudoaneurysm at transplant renal artery anastomosis. Now s/p graft nephrectomy. Course further complicated by saddle PE causing right heart strain and hemodynamic instability leading to cardiac arrest requiring ACLS, Required IR drainage of post-op collections x2. Transferred to floor on 1/25/2020. Course then complicated by hematoma by LUE fistula. Hematology consulted originally for thrombocytosis now reconsulted for hypercoagulable work-up.    PE-   -found on CT angio 1-13-20  -lupus profile negative lupus anticoagulant; antibodies not sent: please check anticardiolipin and beta-2 glycoprotein; given negative LAC, low suspicion for APLS. Results can be reviewed as an outpatient if being discharge to rehab  -can also check F V Leiden mutation testing, Prothrombin  - Continue eliquis for anticoagulation     #Thrombocytosis:  Pt with fluctuating plt counts- on admission, plt 300K-400K now with up-trending counts to 800K  This is most likely 2/2 reactive process given pt's recent hospital course including infection, hematoma evacuation, and cardiac arrest  -Partial iron studies drawn on 1/27/20; recommend checking ferritin as well to complete work up, though might be affected by him having received recent PRBCs; suspect likely has anemia of chronic disease, though iron saturation not markedly elevated, possible mixed with iron deficiency anemia. Please complete iron studies as iron deficiency anemia can contribute to thrombocytosis.  - Peripheral smear reviewed- many large platelets seen, which corroborates a reactive process  -B12, folate normal; LDH and haptoglobin normal  - Would not recommend sending Jak2 or BCR-ABL labs at this time- those can be sent in an outpatient setting if plt continues to rise   - Continue to trend CBC with diff daily   - Will set up outpatient followup for pt on discharge     Per notes and patient, dispo is for rehabilitation. Will sign off case. Please call back with any questions. When patient is being discharged, please notify hematology service so that follow up appointment can be secured at [facility].    Sonny Mc MD, MPH  Hematology / Oncology, PGY5  p

## 2020-02-12 NOTE — PROGRESS NOTE ADULT - ATTENDING COMMENTS
doing well.     line tunneled today    placed back on anticoagulation.     likely upcoming dispo to acute rehab

## 2020-02-12 NOTE — PROGRESS NOTE ADULT - ASSESSMENT
49 yr old man with ESRD s/p DDRT in 12/2019 complicated post transplant course now s/p graft nephrectomy.   On hemodialysis 3 times a week.

## 2020-02-12 NOTE — PROGRESS NOTE ADULT - SUBJECTIVE AND OBJECTIVE BOX
[facility] DIVISION OF KIDNEY DISEASES AND HYPERTENSION -- FOLLOW UP NOTE  --------------------------------------------------------------------------------  49 yr old man with ESRD s/p DDRT in Dec 2019 complicated by DGF due to ATN and early TMA thought to be due to prograf. Was on belatacept/MMF/Pred immunosuppression.   He was initially  admitted 6 weeks ago with influenza, course complicated by perinephric hematoma complicated by enterobacter infection requiring surgical exploration and wash out. Post op course complicated by DVT started on heparin drip, subsequent gross hematuria. He was taken to the OR and found to have dehiscence of the ureter-bladder anastomosis as well as vascular anastomosis. He underwent graft nephrectomy on 1/10/20 with bovine bio patch repair of the EIA. On 1/13/20 he had cardiac arrest due to large pulmonary embolism s/p successful resuscitation.   On 2/1 he had infiltration of his AV fistula with large hematoma.       24 hour events/subjective:  Right IJ tunneled catheter placed yesterday.   No major events. No new complaints.  C/o shortness of breath on ambulating with PT. Able to walk       Standing Inpatient Medications  allopurinol 100 milliGRAM(s) Oral daily  aMIOdarone    Tablet 200 milliGRAM(s) Oral daily  brimonidine 0.2% Ophthalmic Solution 1 Drop(s) Both EYES three times a day  chlorhexidine 2% Cloths 1 Application(s) Topical <User Schedule>  epoetin trey Injectable 70347 Unit(s) IV Push <User Schedule>  famotidine    Tablet 20 milliGRAM(s) Oral daily  gabapentin 100 milliGRAM(s) Oral every 12 hours  latanoprost 0.005% Ophthalmic Solution 1 Drop(s) Both EYES daily  meropenem  IVPB 500 milliGRAM(s) IV Intermittent every 24 hours  metoprolol tartrate 25 milliGRAM(s) Oral every 12 hours  Nephro-candace 1 Tablet(s) Oral daily  nystatin    Suspension 538988 Unit(s) Oral four times a day  polyethylene glycol 3350 17 Gram(s) Oral daily  senna 2 Tablet(s) Oral at bedtime    PRN Inpatient Medications  acetaminophen   Tablet .. 975 milliGRAM(s) Oral every 6 hours PRN  HYDROmorphone  Injectable 0.5 milliGRAM(s) IV Push every 6 hours PRN  oxyCODONE    IR 10 milliGRAM(s) Oral every 6 hours PRN  oxyCODONE    IR 5 milliGRAM(s) Oral every 4 hours PRN        VITALS/PHYSICAL EXAM  --------------------------------------------------------------------------------  T(C): 36.6 (02-12-20 @ 12:45), Max: 37 (02-12-20 @ 05:00)  HR: 61 (02-12-20 @ 12:45) (56 - 79)  BP: 162/76 (02-12-20 @ 12:45) (120/80 - 164/89)  RR: 18 (02-12-20 @ 12:45) (18 - 20)  SpO2: 100% (02-12-20 @ 12:45) (94% - 100%)    Height (cm): 180.34 (02-11-20 @ 12:04)  Weight (kg): 63.5 (02-11-20 @ 12:04)  BMI (kg/m2): 19.5 (02-11-20 @ 12:04)  BSA (m2): 1.81 (02-11-20 @ 12:04)      02-11-20 @ 07:01  -  02-12-20 @ 07:00  --------------------------------------------------------  IN: 720 mL / OUT: 0 mL / NET: 720 mL      Physical Exam:  	Comfortably sitting in recliner, eating breakfast              Right IJ temporary dialysis catheter present               Lungs fair air entry b/l              Regular S1 and S2               Abdomen soft, non tender              B/l LE wrapped in ACE bandage, edema still present              LUE wrapped in ACE bandage, forearm AVF with + thrill              left post shoulder large hematoma -unchanged in size               Alert and non focal       LABS/STUDIES  --------------------------------------------------------------------------------              8.4    13.57 >-----------<  677      [02-12-20 @ 05:47]              26.5     133  |  93  |  26  ----------------------------<  74      [02-12-20 @ 05:47]  4.4   |  25  |  5.10        Ca     9.1     [02-12-20 @ 05:47]      Mg     2.1     [02-12-20 @ 05:47]      Phos  4.1     [02-12-20 @ 05:47]    TPro  6.7  /  Alb  2.5  /  TBili  1.3  /  DBili  0.6  /  AST  19  /  ALT  <5  /  AlkPhos  212  [02-12-20 @ 05:47]    PT/INR: PT 14.3 , INR 1.25       [02-12-20 @ 05:47]  PTT: 35.5       [02-12-20 @ 05:47]

## 2020-02-12 NOTE — PROGRESS NOTE ADULT - SUBJECTIVE AND OBJECTIVE BOX
[facility] NEPHROLOGY SERVICES, [facility]  NEPHROLOGY AND HYPERTENSION  [address]  [address]  [place] [address]  [phone]    MD GIANA GUERRERO, MD JANAE MCDONNELL, MD BARBARA MONET, MD SALUD GARZA, MD CATRINA WOO MD    Resting    Vital Signs Last 24 Hrs  T(C): 36.4 (02-12-20 @ 17:00), Max: 37 (02-12-20 @ 05:00)  T(F): 97.5 (02-12-20 @ 17:00), Max: 98.6 (02-12-20 @ 05:00)  HR: 62 (02-12-20 @ 17:00) (61 - 79)  BP: 131/65 (02-12-20 @ 17:00) (120/80 - 164/89)  RR: 18 (02-12-20 @ 17:00) (18 - 20)  SpO2: 98% (02-12-20 @ 17:00) (95% - 100%)    Respiratory: decreased BS at bases  Cardiovascular: S1 S2  Gastrointestinal: soft NT ND +BS  Extremities:  LUE wrapped, + swollen ; b/l LE edema    acetaminophen   Tablet .. 975 milliGRAM(s) Oral every 6 hours PRN  allopurinol 100 milliGRAM(s) Oral daily  aMIOdarone    Tablet 200 milliGRAM(s) Oral daily  apixaban 5 milliGRAM(s) Oral two times a day  benzocaine 15 mG/menthol 3.6 mG (Sugar-Free) Lozenge 1 Lozenge Oral once  brimonidine 0.2% Ophthalmic Solution 1 Drop(s) Both EYES three times a day  chlorhexidine 2% Cloths 1 Application(s) Topical <User Schedule>  epoetin trey Injectable 66458 Unit(s) IV Push <User Schedule>  famotidine    Tablet 20 milliGRAM(s) Oral daily  gabapentin 100 milliGRAM(s) Oral every 12 hours  HYDROmorphone  Injectable 0.5 milliGRAM(s) IV Push every 6 hours PRN  latanoprost 0.005% Ophthalmic Solution 1 Drop(s) Both EYES daily  meropenem  IVPB 500 milliGRAM(s) IV Intermittent every 24 hours  metoprolol tartrate 25 milliGRAM(s) Oral every 12 hours  Nephro-candace 1 Tablet(s) Oral daily  nystatin    Suspension 572329 Unit(s) Oral four times a day  oxyCODONE    IR 10 milliGRAM(s) Oral every 6 hours PRN  oxyCODONE    IR 5 milliGRAM(s) Oral every 4 hours PRN  polyethylene glycol 3350 17 Gram(s) Oral daily  senna 2 Tablet(s) Oral at bedtime                        8.4    13.57 )-----------( 677      ( 12 Feb 2020 05:47 )             26.5     12 Feb 2020 05:47    133    |  93     |  26     ----------------------------<  74     4.4     |  25     |  5.10     Ca    9.1        12 Feb 2020 05:47  Phos  4.1       12 Feb 2020 05:47  Mg     2.1       12 Feb 2020 05:47    TPro  6.7    /  Alb  2.5    /  TBili  1.3    /  DBili  0.6    /  AST  19     /  ALT  <5     /  AlkPhos  212    12 Feb 2020 05:47    LIVER FUNCTIONS - ( 12 Feb 2020 05:47 )  Alb: 2.5 g/dL / Pro: 6.7 g/dL / ALK PHOS: 212 U/L / ALT: <5 U/L / AST: 19 U/L / GGT: x           PT/INR - ( 12 Feb 2020 05:47 )   PT: 14.3 sec;   INR: 1.25 ratio       PTT - ( 12 Feb 2020 05:47 )  PTT:35.5 sec  CAPILLARY BLOOD GLUCOSE    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, DGF  S/p transplant kidney bx 12/13: ATN, focal TMA  S/p PLEX 12/13, 12/15; d/c-d 12/20 w/op HD 3 x wk  Re-adm 12/27 w/Flu A, infected carlos-nephric hematoma   S/p OR 1/1 for washout and repeat renal graft biopsy (ATN)  Dx w/RLE DVT  Developed gross hematuria  S/p OR 1/10 for infected carlos-nephric hematoma, hemorrhage involving pseudo-aneurysm of anastomosis of renal artery to external iliac vein,   s/p transplant nephrectomy, s/p IVCF   S/p PEA arrest 1/13, + PE, R retroperitoneal collection  S/p IR drainage of abd collection  RUE DVT, AC  Abx per ID  AVF infiltration 2/1 w/hematoma extending to L chest wall while on Eliquis, s/p PRBCs  Next HD tomorrow  TMP as able    [phone]

## 2020-02-12 NOTE — PROGRESS NOTE ADULT - SUBJECTIVE AND OBJECTIVE BOX
Transplant Surgery - Multidisciplinary Rounds  --------------------------------------------------------------  R DDRT    Date:  12/8/19     Present:  Patient seen and examined with multidisciplinary team including Transplant Surgeon: Dr. Espinal, Dr. Be, Dr. Poe, Transplant Nephrologist: Dr. Tamayo, Dr. Puckett.  Pharmacist: Diana, ACPs: Park, PGY 3: Daisy during AM rounds. Disciplines not in attendance will be notified of the plan.     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 Young, followed by Dr. Raphael, [facility] Oncologist), RA with hand contractures. He underwent DDRT on 12/8/19. Post op course c/b DGF requiring HD, thrombocytopenia, elevated LDH and haptoglobin. Schistocytes  on peripheral smear concerning for TMA. Envarsus held. Received Belatacept 12/13. Started on PLEX (12/12, 12/15). Underwent renal bx on 12/13 c/w profound ATN.  Discharged home on 12/20/19 w/ outpatient HD.     Re-admitted 12/27 with influenza, completed treatement with Tamiflu on 12/31. Urine cx on admission grew Ent cloacae, was started on meropenem 12/31. Renal US on admission with increasing hematoma.   ·	OR 1/1 for ex-lap, hematoma evacuation, washout and renal bx. (c/w ATN)  OR cxs grew CRE and e coli. Post op course c/b:   ·	fevers and leukocytosis, CT a/p (1/4) with increased perinephric collection. Underwent IR guided collection 1/6   ·	New onset hematuria, martinez inserted 1/7, started CBI  ·	RLE edema, RLE doppler (1/8) with acute above the knee thrombus of R external iliac, common femoral and femoral veins, Acute calf vein thrombus affecting R soleal vein, Heparin gtt initiated   ·	AMS 1/9  (head CT neg), hyponatremic  ·	Significant hematuria and bleeding around the martinez. Angio 1/9 showed actively bleeding pseudoaneurysm at the anastomosis of the transplant renal artery with the recipient iliac artery. A stent was placed in the iliac artery to  control hemorrhage. IVC filter placed.   ·	Emergent OR 1/9-1/10 for graft nephrectomy;  ureter to bladder anastomosis was completely disrupted, repaired the R external iliac artery with bovine pericardial patch, repaired right external iliac vein, performed thrombectomy of RLE veins, Intra op received 3u PRBC.   ·	Extubated 1/11  ·	1/13 Saddle PE, with right heart strain and complicated by PEA arrest followed by Torsade, ROSC achieved after ACLS; re-intubated  ·	1/15 Renal US with 13.7 x 5.0 x 1.5 cm in RLQ transplant bed; s/p bedside IR guided drainage, pigtail in place. Fluid cx to date with NG  ·	1/21 CT C/A/P showing persistent collection, catheter at superior portion of collection  ·	1/22 IR pigtail drain repositioned and upsized to 12Fr catheter w/ increased output  ·	1/23 Transitioned to Eliquis from Heparin drip  ·	2/1  AVF infiltrated during HD w/ - arterial extrav/ bleeding to the left upper arm and chest noted on CTA   ·	2/3 Left upper cavity arteriography demonstrated a tiny pseudoaneurysm arising from a distal branch of the left deep brachial artery. The tiny feeder artery was dissected resulting in resolution of the pseudoaneurysm  ·	2/10 Wound VAc removed  ·	2/11 Permacath placed by IR    Interval events:    - H/H stable  8.4/26.5  PLT downtrending 677 from 776   - TMax 37 WBC 13.57 from 13.34 from 13.68. On meropenem (12/31) and Micafungin (1/20 - 2/10. Fluc 1/5-1/20)   Blood cultures 1/16, 1/21, 1/24 Negative  - Still with 2-3+ LE edema, flank edema   - LUE edema with tenderness under axilla along rib cage, across left chest - improved. Skin tear under L axilla and along medial portion of LUE persist  -> tender.   - Notable swelling noted posterior to left axilla ->non-tender, firm to touch, Unchanged  - Elliquis and ASA held for permacath placement yesterday      cxs:   12/29: Urine Cx: Enterobacter Cloacae  1/1 OR Perinephric collection Cx:  Carbapenem resistant Ent Cloacae, E. Coli  1/1: R kidney abscess swab: Enterobacter Cloacae  1/1  OR tissue: Enterobacter Cloacae  1/5: Urine Cx: Enteropbacter Cloacae  1/5: Skin incision Cx (bedside): NG  1/6: Adominal fluid Cx from IR:  NG   1/7: Urine Cx:  NG  1/7: BCX2:  NG  1/10 retroperit hematoma - enerobacter   1/10 bladder hematoma - enterobacter  1/15 Drainage fluid - NGTD  1/16: BC X2: Negative  1/18: SPutum: Normal harvey  1/21: BC X2: negative  1/22: IR body fluid Cx: negative  1/24: body fluid fungal: ngtd  1/24: BC x2: negative    Potential Discharge date: pending clinical improvement       MEDICATIONS  (STANDING):  allopurinol 100 milliGRAM(s) Oral daily  aMIOdarone    Tablet 200 milliGRAM(s) Oral daily  apixaban 5 milliGRAM(s) Oral two times a day  brimonidine 0.2% Ophthalmic Solution 1 Drop(s) Both EYES three times a day  chlorhexidine 2% Cloths 1 Application(s) Topical <User Schedule>  epoetin trey Injectable 83076 Unit(s) IV Push <User Schedule>  famotidine    Tablet 20 milliGRAM(s) Oral daily  gabapentin 100 milliGRAM(s) Oral every 12 hours  latanoprost 0.005% Ophthalmic Solution 1 Drop(s) Both EYES daily  meropenem  IVPB 500 milliGRAM(s) IV Intermittent every 24 hours  metoprolol tartrate 25 milliGRAM(s) Oral every 12 hours  Nephro-candace 1 Tablet(s) Oral daily  nystatin    Suspension 956517 Unit(s) Oral four times a day  polyethylene glycol 3350 17 Gram(s) Oral daily  senna 2 Tablet(s) Oral at bedtime    MEDICATIONS  (PRN):  acetaminophen   Tablet .. 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  Carpal tunnel syndrome  Brain cyst: had MRI recently- now gone  Anemia  Gastric ulcer: Long time ago  Rheumatoid arthritis  Renal transplant recipient  Breakdown (mechanical) of penile (implanted) prosthesis, sequela  End-stage renal disease (ESRD): PERMACATH PLACEMENT  Abscess of left buttock: s/p I &amp; D  AV fistula: placement left lower arm  S/P cystoscopy: stent placement &amp; removal for kidney stones, lithiotripsy  s/p Lt Carpal tunnel: 2011      Vital Signs Last 24 Hrs  T(C): 36.9 (12 Feb 2020 10:15), Max: 37 (12 Feb 2020 05:00)  T(F): 98.4 (12 Feb 2020 10:15), Max: 98.6 (12 Feb 2020 05:00)  HR: 63 (12 Feb 2020 10:15) (56 - 79)  BP: 143/88 (12 Feb 2020 10:15) (120/80 - 164/89)  BP(mean): 100 (11 Feb 2020 12:04) (100 - 100)  RR: 18 (12 Feb 2020 10:15) (18 - 20)  SpO2: 96% (12 Feb 2020 10:15) (94% - 99%)    I&O's Summary    11 Feb 2020 07:01  -  12 Feb 2020 07:00  --------------------------------------------------------  IN: 720 mL / OUT: 0 mL / NET: 720 mL                              8.4    13.57 )-----------( 677      ( 12 Feb 2020 05:47 )             26.5     02-12    133<L>  |  93<L>  |  26<H>  ----------------------------<  74  4.4   |  25  |  5.10<H>    Ca    9.1      12 Feb 2020 05:47  Phos  4.1     02-12  Mg     2.1     02-12    TPro  6.7  /  Alb  2.5<L>  /  TBili  1.3<H>  /  DBili  0.6<H>  /  AST  19  /  ALT  <5<L>  /  AlkPhos  212<H>  02-12      REVIEW OF SYSTEMS  Gen: + weakness, difficulty ambulating due to BLE edema  Skin: No rashes  Head/Eyes/Ears/Mouth: No headache; Normal hearing; Normal vision w/o blurriness; No sinus pain/discomfort, sore throat  Respiratory: no SOB  CV: No chest pain, PND, orthopnea  GI: no diarrhea, no constipation   : anuric  MSK: c/o LUE pain and tenderness but reports improvement.   Neuro: No dizziness/lightheadedness, weakness, seizures, numbness, tingling  Heme: No easy bruising or bleeding  Endo: No heat/cold intolerance  Psych: No significant nervousness, anxiety, stress, depression      PHYSICAL EXAM:    Constitutional: NAD  	Eyes: Anicteric, PERRLA  	Respiratory: CTA b/l  	Cardiovascular: RRR  	Gastrointestinal: Soft abdomen, appropriate incisional TTP, ND. RLQ wound covered with aquacell/DSD.    Genitourinary:  anuric, significant scrotal edema     Extremities: LUE and left chest edema, improved.  Notable swelling posterior to left axilla, firm to touch, no tender. Skin tear noted on medial aspect LUE and axilla - tender. 2-3+ BLE edema with ace wraps above knee. + flank edema.  Small superificial blister anterior R foot and L foot  	Vascular: DP signal bilateral, L AVF palpable  	Neurological: A&O x3.  	Skin: no new lesions  	Musculoskeletal: Moving all extremities

## 2020-02-12 NOTE — PROGRESS NOTE ADULT - ATTENDING COMMENTS
I agree with the fellow's note with the following addendums/exceptions. Briefly, 49M PMHx of ESRD on HD  (via LUE AVF), HTN, Gout, Glaucoma, NET of pancreas, RA with hand contractures. s/p DDRT 12/8/19 with complicated post-operative course. Continues to have anemia and thrombocytosis, suspect anemia is multifactorial and thrombocytosis is reactive to multiple procedures, infections.  -stable for discharge with outpatient hematology follow up

## 2020-02-12 NOTE — PROGRESS NOTE ADULT - PROBLEM SELECTOR PLAN 1
Right IJ tunneled catheter placed yesterday. LUE AVF s/p infiltration with extensive arm edema. Will use permacath until arm swelling subsides.   Continue HD 3 times a week per primary nephrologist.

## 2020-02-13 LAB
ALBUMIN SERPL ELPH-MCNC: 2.8 G/DL — LOW (ref 3.3–5)
ALP SERPL-CCNC: 202 U/L — HIGH (ref 40–120)
ALT FLD-CCNC: 5 U/L — LOW (ref 10–45)
ANION GAP SERPL CALC-SCNC: 16 MMOL/L — SIGNIFICANT CHANGE UP (ref 5–17)
APTT BLD: 40.2 SEC — HIGH (ref 27.5–36.3)
AST SERPL-CCNC: 21 U/L — SIGNIFICANT CHANGE UP (ref 10–40)
BILIRUB DIRECT SERPL-MCNC: 0.6 MG/DL — HIGH (ref 0–0.2)
BILIRUB INDIRECT FLD-MCNC: 0.7 MG/DL — SIGNIFICANT CHANGE UP (ref 0.2–1)
BILIRUB SERPL-MCNC: 1.3 MG/DL — HIGH (ref 0.2–1.2)
BUN SERPL-MCNC: 32 MG/DL — HIGH (ref 7–23)
CALCIUM SERPL-MCNC: 9.2 MG/DL — SIGNIFICANT CHANGE UP (ref 8.4–10.5)
CHLORIDE SERPL-SCNC: 94 MMOL/L — LOW (ref 96–108)
CO2 SERPL-SCNC: 24 MMOL/L — SIGNIFICANT CHANGE UP (ref 22–31)
CREAT SERPL-MCNC: 6.33 MG/DL — HIGH (ref 0.5–1.3)
GLUCOSE SERPL-MCNC: 86 MG/DL — SIGNIFICANT CHANGE UP (ref 70–99)
HCT VFR BLD CALC: 25.7 % — LOW (ref 39–50)
HGB BLD-MCNC: 8.2 G/DL — LOW (ref 13–17)
INR BLD: 1.43 RATIO — HIGH (ref 0.88–1.16)
MAGNESIUM SERPL-MCNC: 2.2 MG/DL — SIGNIFICANT CHANGE UP (ref 1.6–2.6)
MCHC RBC-ENTMCNC: 29.7 PG — SIGNIFICANT CHANGE UP (ref 27–34)
MCHC RBC-ENTMCNC: 31.9 GM/DL — LOW (ref 32–36)
MCV RBC AUTO: 93.1 FL — SIGNIFICANT CHANGE UP (ref 80–100)
NRBC # BLD: 0 /100 WBCS — SIGNIFICANT CHANGE UP (ref 0–0)
PHOSPHATE SERPL-MCNC: 4.7 MG/DL — HIGH (ref 2.5–4.5)
PLATELET # BLD AUTO: 705 K/UL — HIGH (ref 150–400)
POTASSIUM SERPL-MCNC: 4.2 MMOL/L — SIGNIFICANT CHANGE UP (ref 3.5–5.3)
POTASSIUM SERPL-SCNC: 4.2 MMOL/L — SIGNIFICANT CHANGE UP (ref 3.5–5.3)
PROCALCITONIN SERPL-MCNC: 1 NG/ML — HIGH (ref 0.02–0.1)
PROT SERPL-MCNC: 6.8 G/DL — SIGNIFICANT CHANGE UP (ref 6–8.3)
PROTHROM AB SERPL-ACNC: 16.5 SEC — HIGH (ref 10–12.9)
RBC # BLD: 2.76 M/UL — LOW (ref 4.2–5.8)
RBC # FLD: 15.5 % — HIGH (ref 10.3–14.5)
SODIUM SERPL-SCNC: 134 MMOL/L — LOW (ref 135–145)
WBC # BLD: 12.36 K/UL — HIGH (ref 3.8–10.5)
WBC # FLD AUTO: 12.36 K/UL — HIGH (ref 3.8–10.5)

## 2020-02-13 PROCEDURE — 99231 SBSQ HOSP IP/OBS SF/LOW 25: CPT

## 2020-02-13 PROCEDURE — 99232 SBSQ HOSP IP/OBS MODERATE 35: CPT

## 2020-02-13 RX ADMIN — OXYCODONE HYDROCHLORIDE 5 MILLIGRAM(S): 5 TABLET ORAL at 01:37

## 2020-02-13 RX ADMIN — CHLORHEXIDINE GLUCONATE 1 APPLICATION(S): 213 SOLUTION TOPICAL at 08:00

## 2020-02-13 RX ADMIN — ERYTHROPOIETIN 10000 UNIT(S): 10000 INJECTION, SOLUTION INTRAVENOUS; SUBCUTANEOUS at 10:38

## 2020-02-13 RX ADMIN — BRIMONIDINE TARTRATE 1 DROP(S): 2 SOLUTION/ DROPS OPHTHALMIC at 13:04

## 2020-02-13 RX ADMIN — APIXABAN 5 MILLIGRAM(S): 2.5 TABLET, FILM COATED ORAL at 05:10

## 2020-02-13 RX ADMIN — Medication 1 TABLET(S): at 12:31

## 2020-02-13 RX ADMIN — OXYCODONE HYDROCHLORIDE 5 MILLIGRAM(S): 5 TABLET ORAL at 07:30

## 2020-02-13 RX ADMIN — GABAPENTIN 100 MILLIGRAM(S): 400 CAPSULE ORAL at 17:26

## 2020-02-13 RX ADMIN — APIXABAN 5 MILLIGRAM(S): 2.5 TABLET, FILM COATED ORAL at 17:26

## 2020-02-13 RX ADMIN — MEROPENEM 100 MILLIGRAM(S): 1 INJECTION INTRAVENOUS at 12:33

## 2020-02-13 RX ADMIN — BRIMONIDINE TARTRATE 1 DROP(S): 2 SOLUTION/ DROPS OPHTHALMIC at 05:11

## 2020-02-13 RX ADMIN — OXYCODONE HYDROCHLORIDE 5 MILLIGRAM(S): 5 TABLET ORAL at 16:42

## 2020-02-13 RX ADMIN — AMIODARONE HYDROCHLORIDE 200 MILLIGRAM(S): 400 TABLET ORAL at 05:15

## 2020-02-13 RX ADMIN — Medication 500000 UNIT(S): at 23:34

## 2020-02-13 RX ADMIN — Medication 500000 UNIT(S): at 12:32

## 2020-02-13 RX ADMIN — Medication 500000 UNIT(S): at 05:12

## 2020-02-13 RX ADMIN — OXYCODONE HYDROCHLORIDE 5 MILLIGRAM(S): 5 TABLET ORAL at 20:55

## 2020-02-13 RX ADMIN — POLYETHYLENE GLYCOL 3350 17 GRAM(S): 17 POWDER, FOR SOLUTION ORAL at 12:32

## 2020-02-13 RX ADMIN — Medication 100 MILLIGRAM(S): at 12:31

## 2020-02-13 RX ADMIN — GABAPENTIN 100 MILLIGRAM(S): 400 CAPSULE ORAL at 05:11

## 2020-02-13 RX ADMIN — BRIMONIDINE TARTRATE 1 DROP(S): 2 SOLUTION/ DROPS OPHTHALMIC at 22:23

## 2020-02-13 RX ADMIN — OXYCODONE HYDROCHLORIDE 5 MILLIGRAM(S): 5 TABLET ORAL at 16:12

## 2020-02-13 RX ADMIN — FAMOTIDINE 20 MILLIGRAM(S): 10 INJECTION INTRAVENOUS at 12:31

## 2020-02-13 RX ADMIN — OXYCODONE HYDROCHLORIDE 5 MILLIGRAM(S): 5 TABLET ORAL at 20:18

## 2020-02-13 RX ADMIN — Medication 500000 UNIT(S): at 17:26

## 2020-02-13 RX ADMIN — OXYCODONE HYDROCHLORIDE 5 MILLIGRAM(S): 5 TABLET ORAL at 06:51

## 2020-02-13 RX ADMIN — OXYCODONE HYDROCHLORIDE 5 MILLIGRAM(S): 5 TABLET ORAL at 00:37

## 2020-02-13 RX ADMIN — Medication 25 MILLIGRAM(S): at 17:26

## 2020-02-13 RX ADMIN — LATANOPROST 1 DROP(S): 0.05 SOLUTION/ DROPS OPHTHALMIC; TOPICAL at 12:32

## 2020-02-13 NOTE — PROGRESS NOTE ADULT - SUBJECTIVE AND OBJECTIVE BOX
Transplant Surgery - Multidisciplinary Rounds  --------------------------------------------------------------  R DDRT    Date:  12/8/19     Present:  Patient seen and examined with multidisciplinary team including Transplant Surgeon: Dr. Cruz, Dr. Espinal, Dr. Be, Dr. Poe, Transplant Nephrologist: Dr. Tamayo.  Pharmacist: Diana, ACPs: Gilbert, and Dino, PGY 3: Belkys and Davonte during AM rounds. Disciplines not in attendance will be notified of the plan.     HPI: 49M PMHx of ESRD on HD  ( via LUE AVF), HTN, Gout, Glaucoma, NET of pancreas (s/p robotic distal panc/splenectomy at [place] 2015 by Dr. Young, followed by Dr. Raphael, [facility] Oncologist), RA with hand contractures. He underwent DDRT on 12/8/19. Post op course c/b DGF requiring HD, thrombocytopenia, elevated LDH and haptoglobin. Schistocytes  on peripheral smear concerning for TMA. Envarsus held. Received Belatacept 12/13. Started on PLEX (12/12, 12/15). Underwent renal bx on 12/13 c/w profound ATN.  Discharged home on 12/20/19 w/ outpatient HD.     Re-admitted 12/27 with influenza, completed treatement with Tamiflu on 12/31. Urine cx on admission grew Ent cloacae, was started on meropenem 12/31. Renal US on admission with increasing hematoma.   OR 1/1 for ex-lap, hematoma evacuation, washout and renal bx. (c/w ATN)  OR cxs grew CRE and e coli. Post op course c/b:   fevers and leukocytosis, CT a/p (1/4) with increased perinephric collection. Underwent IR guided collection 1/6   New onset hematuria, martinez inserted 1/7, started CBI  RLE edema, RLE doppler (1/8) with acute above the knee thrombus of R external iliac, common femoral and femoral veins, Acute calf vein thrombus affecting R soleal vein, Heparin gtt initiated   AMS 1/9  (head CT neg), hyponatremic  Significant hematuria and bleeding around the martinez. Angio 1/9 showed actively bleeding pseudoaneurysm at the anastomosis of the transplant renal artery with the recipient iliac artery. A stent was placed in the iliac artery to  control hemorrhage. IVC filter placed.   Emergent OR 1/9-1/10 for graft nephrectomy;  ureter to bladder anastomosis was completely disrupted, repaired the R external iliac artery with bovine pericardial patch, repaired right external iliac vein, performed thrombectomy of RLE veins, Intra op received 3u PRBC.   Extubated 1/11 1/13 Saddle PE, with right heart strain and complicated by PEA arrest followed by Torsade, ROSC achieved after ACLS; re-intubated  1/15 Renal US with 13.7 x 5.0 x 1.5 cm in RLQ transplant bed; s/p bedside IR guided drainage, pigtail in place. Fluid cx to date with NG  1/21 CT C/A/P showing persistent collection, catheter at superior portion of collection  1/22 IR pigtail drain repositioned and upsized to 12Fr catheter w/ increased output  1/23 Transitioned to Eliquis from Heparin drip  2/1  AVF infiltrated during HD w/ - arterial extrav/ bleeding to the left upper arm and chest noted on CTA   2/3 Left upper cavity arteriography demonstrated a tiny pseudoaneurysm arising from a distal branch of the left deep brachial artery. The tiny feeder artery was dissected resulting in resolution of the pseudoaneurysm  2/10 Wound VAc removed  2/11 Permacath placed by IR  2/12 Started Eliquis 5BID. D/C to acute rehab tomorrow bed available per Mari  C/O sore throat-lozenger, refused RVP; rapid strep neg.   2/13: Will monitor for one more day while Eliquis was just restarted    Interval events:    - H/H stable  8.4/26.5  PLT downtrending 677 from 776   - TMax 37 WBC 13.57 from 13.34 from 13.68. On meropenem (12/31) and Micafungin (1/20 - 2/10. Fluc 1/5-1/20)   Blood cultures 1/16, 1/21, 1/24 Negative  - Still with 2-3+ LE edema, flank edema   - LUE edema with tenderness under axilla along rib cage, across left chest - improved. Skin tear under L axilla and along medial portion of LUE persist  -> tender.   - Notable swelling noted posterior to left axilla ->non-tender, firm to touch, Unchanged  - Restarted Eliquis without issue      ALLERGIES & MEDICATIONS  --------------------------------------------------------------------------------  Allergies    coconut (Anaphylaxis)  morphine (Pruritus; Rash)    Intolerances      Standing Inpatient Medications  allopurinol 100 milliGRAM(s) Oral daily  aMIOdarone    Tablet 200 milliGRAM(s) Oral daily  apixaban 5 milliGRAM(s) Oral two times a day  brimonidine 0.2% Ophthalmic Solution 1 Drop(s) Both EYES three times a day  chlorhexidine 2% Cloths 1 Application(s) Topical <User Schedule>  epoetin trey Injectable 97582 Unit(s) IV Push <User Schedule>  famotidine    Tablet 20 milliGRAM(s) Oral daily  gabapentin 100 milliGRAM(s) Oral every 12 hours  latanoprost 0.005% Ophthalmic Solution 1 Drop(s) Both EYES daily  meropenem  IVPB 500 milliGRAM(s) IV Intermittent every 24 hours  metoprolol tartrate 25 milliGRAM(s) Oral every 12 hours  Nephro-candace 1 Tablet(s) Oral daily  nystatin    Suspension 086552 Unit(s) Oral four times a day  polyethylene glycol 3350 17 Gram(s) Oral daily  senna 2 Tablet(s) Oral at bedtime    PRN Inpatient Medications  acetaminophen   Tablet .. 975 milliGRAM(s) Oral every 6 hours PRN  benzocaine 15 mG/menthol 3.6 mG (Sugar-Free) Lozenge 1 Lozenge Oral three times a day PRN  HYDROmorphone  Injectable 0.5 milliGRAM(s) IV Push every 6 hours PRN  oxyCODONE    IR 5 milliGRAM(s) Oral every 4 hours PRN      REVIEW OF SYSTEMS  --------------------------------------------------------------------------------  Gen: + weakness, difficulty ambulating due to BLE edema  Skin: No rashes  Head/Eyes/Ears/Mouth: No headache; Normal hearing; Normal vision w/o blurriness; No sinus pain/discomfort, sore throat  Respiratory: no SOB  CV: No chest pain, PND, orthopnea  GI: no diarrhea, no constipation   : anuric  MSK: c/o LUE pain and tenderness but reports improvement.   Neuro: No dizziness/lightheadedness, weakness, seizures, numbness, tingling  Heme: No easy bruising or bleeding  Endo: No heat/cold intolerance  Psych: No significant nervousness, anxiety, stress, depression      VITALS/PHYSICAL EXAM  --------------------------------------------------------------------------------  T(C): 36.4 (02-13-20 @ 05:01), Max: 36.7 (02-12-20 @ 21:00)  HR: 60 (02-13-20 @ 05:01) (60 - 62)  BP: 153/82 (02-13-20 @ 05:01) (131/65 - 162/76)  RR: 18 (02-13-20 @ 05:01) (18 - 18)  SpO2: 98% (02-13-20 @ 05:01) (97% - 100%)  Wt(kg): --  Height (cm): 180.34 (02-11-20 @ 12:04)  Weight (kg): 63.5 (02-11-20 @ 12:04)  BMI (kg/m2): 19.5 (02-11-20 @ 12:04)  BSA (m2): 1.81 (02-11-20 @ 12:04)      02-12-20 @ 07:01  -  02-13-20 @ 07:00  --------------------------------------------------------  IN: 1070 mL / OUT: 0 mL / NET: 1070 mL      Physical Exam:  Constitutional: NAD  Eyes: Anicteric, PERRLA  Respiratory: CTA b/l  Cardiovascular: RRR  Gastrointestinal: Soft abdomen, appropriate incisional TTP, ND. RLQ wound covered with aquacell/DSD.  Genitourinary:  anuric, significant scrotal edema   Extremities: LUE and left chest edema, improved.  Notable swelling posterior to left axilla, firm to touch, no tender. Skin tear noted on medial aspect LUE and axilla - tender. 2-3+ BLE edema with ace wraps above knee. + flank edema.  Small superificial blister anterior R foot and L foot  Vascular: DP signal bilateral, L AVF palpable  Neurological: A&O x3.  Skin: no new lesions  Musculoskeletal: Moving all extremities      LABS/STUDIES  --------------------------------------------------------------------------------              8.2    12.36 >-----------<  705      [02-13-20 @ 09:52]              25.7     133  |  93  |  26  ----------------------------<  74      [02-12-20 @ 05:47]  4.4   |  25  |  5.10        Ca     9.1     [02-12-20 @ 05:47]      Mg     2.1     [02-12-20 @ 05:47]      Phos  4.1     [02-12-20 @ 05:47]    TPro  6.7  /  Alb  2.5  /  TBili  1.3  /  DBili  0.6  /  AST  19  /  ALT  <5  /  AlkPhos  212  [02-12-20 @ 05:47]    PT/INR: PT 16.5 , INR 1.43       [02-13-20 @ 09:52]  PTT: 40.2       [02-13-20 @ 09:52]      Creatinine Trend:  SCr 5.10 [02-12 @ 05:47]  SCr 6.52 [02-11 @ 05:55]  SCr 5.67 [02-10 @ 06:38]  SCr 4.49 [02-09 @ 06:39]  SCr 5.75 [02-08 @ 08:26]          BK Virus DNA by PCR:   [facility] Assay Range: 39 copies/mL to 1.00E+10 copies/mL  The limit of quantitation (LOQ) is 39 copies/mL. BK virus DNA  detected below the LOQ will be reported as Detected:<39 copies/mL.  This test was developed and its performance characteristics  determined by [facility]. It has not been cleared or approved  by the U.S. Food and Drug Administration. Results should be used in  conjunction with clinical findings, and should not form the sole  basis for a diagnosis or treatment decision.  ____________________________________________________________  Performed at:  [facility]  [address] [place] [address]  Laura Pettit PhD HCLD(ABB)  CLIA# [id number] (01-14 @ 11:42)          Iron 23, TIBC 189, %sat 12      [01-27-20 @ 14:03]  HbA1c 5.2      [01-13-20 @ 22:59]

## 2020-02-13 NOTE — PROGRESS NOTE ADULT - ASSESSMENT
49 year old male with ESRD on HD, now s/p ddrt on 12/8/2019 course complicated by TMA/profound ATN, who presents from a HD center with a fever and cough. He was initially admitted with influenza. He is s/p washout of infected collection and biopsy which revealed ATN.  He was found to have diffusely enlarging perinephric fluid collection.    While on telemetry, he had an episode of a wide complex tachycardia. It initially appears irregular, suggesting an atrial arrhythmia with aberrancy, though the remainder appears more like an episode of non-sustained VT.   s/p washout on 1/1/2020 with worsening sepsis and transfer to ICU, s/p IR drainage of perinephric collection on 1/6. 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 He underwent operative repair of right external iliac artery with pericardial patch, transplant nephrectomy, RLE thrombectomy, and IVC filter on 1/9.     s/p corrina ->tachy arrest with tdp, af rvr, now on amio, with large PE  He then returned to the SICU with significant lue swelling and active arterial extravasation, with drop in blood counts. He is s/p angiogram with tiny pseudoaneurysm.    - remains in sr   - had developed malignant arrhythmias which seemed to start with bradycardia, leading to more tachycardia and bursts of VT and rapid AF.  The apparent af degenerated into torsades.  He was shocked and has been maintaining SR since. Arrhythmias were most likely triggered by the massive vte event, are unlikely to represent a primary cardiac issue  - cont amio at 200 daily. cont metoprolol   - repeat ekg daily to evaluate qtc, while on antifungals/amiodarone  - hold asa     - on apixaban for his PE- trend CBC. Transfuse to keep hb >8  - restart a/c when no contraindication  - echo with preserved/hyperdynamic lv, with RV failure consistent with acute RV overload.  Repeat echo noted, and seemingly with improved RV function  - remains with marked leg edema/volume overload. cont hd for fluid removal.    - Further cardiac workup will depend on clinical course.  - will follow with you

## 2020-02-13 NOTE — PROGRESS NOTE ADULT - ASSESSMENT
49M PMHx of ESRD on HD  ( via LUE AVF), HTN, Gout, Glaucoma, NET of pancreas, RA with hand contractures. s/p DDRT 12/8/19,  Post op course c/b DGF requiring HD. Renal bx x 2 c/w profound ATN.     Re-admitted with Influenza and perinephric hematoma. s/p hematoma evacuation/ abd washout and renal biopsy. Post op course c/b leukocytosis, infected hematoma, acute RLE DVT, and bleeding pseudoaneurysm at txp renal artery anastomosis. Now s/p graft nephrectomy. Course further complicated by saddle PE causing right heart strain and hemodynamic instability leading to cardiac arrest requiring ACLS,  Required IR drainage of  post-op collections x 2  now complicated by bleeding following cannulation of  LUE AVF with findings of a tiny pseudoaneurysm arising from a distal branch of the left deep brachial artery s/p dissection of a tiny feeder artery that resulted in resolution of the pseudoaneurysm.    [] DDRT c/b DGF/ATN/perinephric infected hematoma/pseudoneurysm/graft nephrectomy   - S/P AFV cannulation for HD c/b infiltration causing extravasation/bleeding to the left upper arm and chest noted on CTA  - S/P IR LUE arteriography showing a tiny pseudoaneurysm arising from a distal branch of the left deep brachial artery s/p dissection of a tiny feeder artery that resulted in resolution of the pseudoaneurysm.  - Daily CBC  - S/P IR permacath placement yesterday and had HD   - Eliquis 5mg BID started 2/12 and hold Aspirin    - ID following;  Cont Meropenem and change to Cipro at time for discharge ->  End date 2/20.    - pain control  - bowel regimen  -on pepcid.   - PT/OT/OOB.    - RLQ Wound granulating. Aquacel dressing changes daily   - Thigh high compression stocking/ACE wrap to bilateral LE    - ACE wrap LUE from hand to shoulder.       [] Saddle PE, with right heart strain and complicated by PEA arrest followed by Torsade, ROSC achieved after ACLS  - vascular, CT and vascular cardiology following   -Repeat chest CT 1/28 noted Persistent bilateral lower lobe pulmonary emboli.   - CT 1/21: There are pulmonary emboli within the right lower lobar arteries and the left lower lobar arteries extending into the distal segments as seen on prior study. The pulmonary emboli seen in the right and left pulmonary arteries are no longer visualized. New distal to mid brachial DVT noted  - 1/19 Duplex: RUE Deep vein thrombosis in the mid to distal brachial vein.  - repeat ECHO with improved R heart strain  - eliquis 5mg BID    [] New Afib/Vtech  - Cardiology following, BB low dose in the setting of WCT and hold BB on HD days  - Continue amiodarone, card plan to continue for couple of months and will f/u as outpt    [] New acute DVT of R distal brachia vein , New acute DVT of R external iliac, common femoral, femoral veins, R soleal vein   - IVC filter placed 1/9. Eliquis 5mg bid    [] Thrombocytosis  - [id number] ; were previously in 800s; cont to trend.    -Heme consulted; likely reactive.   - Hold ASA   - eliquis resumed    [] Dispo  -discharge to acute rehab once bed is available

## 2020-02-13 NOTE — PROGRESS NOTE ADULT - SUBJECTIVE AND OBJECTIVE BOX
Follow Up:  infected hematoma    Interval History: seen at dialysis suite today. notes he feels very cold while on HD. Denies any other complaints    REVIEW OF SYSTEMS  [  ] ROS unobtainable because:    [x  ] All other systems negative except as noted below    Constitutional:  [ ] fever [ ] chills  [ ] weight loss  [ ] weakness  Skin:  [ ] rash [ ] phlebitis	  Eyes: [ ] icterus [ ] pain  [ ] discharge	  ENMT: [ ] sore throat  [ ] thrush [ ] ulcers [ ] exudates  Respiratory: [ ] dyspnea [ ] hemoptysis [ ] cough [ ] sputum	  Cardiovascular:  [ ] chest pain [ ] palpitations [ ] edema	  Gastrointestinal:  [ ] nausea [ ] vomiting [ ] diarrhea [ ] constipation [ ] pain	  Genitourinary:  [ ] dysuria [ ] frequency [ ] hematuria [ ] discharge [ ] flank pain  [ ] incontinence  Musculoskeletal:  [ ] myalgias [ ] arthralgias [ ] arthritis  [ ] back pain  Neurological:  [ ] headache [ ] seizures  [ ] confusion/altered mental status    Allergies  coconut (Anaphylaxis)  morphine (Pruritus; Rash)        ANTIMICROBIALS:  meropenem  IVPB 500 every 24 hours  nystatin    Suspension 687007 four times a day      OTHER MEDS:  MEDICATIONS  (STANDING):  acetaminophen   Tablet .. 975 every 6 hours PRN  allopurinol 100 daily  aMIOdarone    Tablet 200 daily  apixaban 5 two times a day  epoetin trey Injectable 62476 <User Schedule>  famotidine    Tablet 20 daily  gabapentin 100 every 12 hours  HYDROmorphone  Injectable 0.5 every 6 hours PRN  metoprolol tartrate 25 every 12 hours  oxyCODONE    IR 5 every 4 hours PRN  polyethylene glycol 3350 17 daily  senna 2 at bedtime      Vital Signs Last 24 Hrs  T(C): 36.9 (13 Feb 2020 13:42), Max: 36.9 (13 Feb 2020 13:42)  T(F): 98.5 (13 Feb 2020 13:42), Max: 98.5 (13 Feb 2020 13:42)  HR: 65 (13 Feb 2020 13:42) (60 - 69)  BP: 129/88 (13 Feb 2020 13:42) (129/88 - 153/82)  BP(mean): --  RR: 18 (13 Feb 2020 13:42) (18 - 18)  SpO2: 96% (13 Feb 2020 13:42) (95% - 98%)    PHYSICAL EXAMINATION:  General: Alert and Awake, NAD  HEENT: Patient refusing examination today (states he is too cold to take sheets off)  Neck: Patient refusing examination today (states he is too cold to take sheets off)  Cardiac: Patient refusing examination today (states he is too cold to take sheets off)  Resp: Patient refusing examination today (states he is too cold to take sheets off)  Abdomen: Patient refusing examination today (states he is too cold to take sheets off)  MSK: Patient refusing examination today (states he is too cold to take sheets off)  : Patient refusing examination today (states he is too cold to take sheets off)  Skin: Patient refusing examination today (states he is too cold to take sheets off)  Neuro: Alert and Awake. CN 2-12 Grossly intact. Moves all four extremities spontaneously.  Psych: Calm, Pleasant, Cooperative                          8.2    12.36 )-----------( 705      ( 13 Feb 2020 09:52 )             25.7       02-13    134<L>  |  94<L>  |  32<H>  ----------------------------<  86  4.2   |  24  |  6.33<H>    Ca    9.2      13 Feb 2020 09:52  Phos  4.7     02-13  Mg     2.2     02-13    TPro  6.8  /  Alb  2.8<L>  /  TBili  1.3<H>  /  DBili  0.6<H>  /  AST  21  /  ALT  5<L>  /  AlkPhos  202<H>  02-13          MICROBIOLOGY:  v  .Blood Blood  01-24-20   No growth at 5 days.  --  --      .Body Fluid RLQ Fluid Collection  01-24-20   No growth  --  --      .Blood Blood  01-24-20   No growth at 5 days.  --  --      .Body Fluid Abdominal Fluid  01-22-20   No growth at 5 days  --    polymorphonuclear leukocytes seen  Gram Negative Rods seen  by cytocentrifuge      .Blood Blood  01-21-20   No growth at 5 days.  --  --      .Blood Blood  01-21-20   No growth at 5 days.  --  --      .Sputum Sputum, trap  01-18-20   Normal Respiratory Gillian present  --    No polymorphonuclear leukocytes per low power field  Rare Squamous epithelial cells per low power field  Rare Gram positive cocci in pairs per oil power field      .Blood Blood  01-16-20   No growth at 5 days.  --  --      .Body Fluid Abdominal Fluid  01-15-20   No growth at 5 days  --    polymorphonuclear leukocytes seen  No organisms seen  by cytocentrifuge                RADIOLOGY:    <The imaging below has been reviewed and visualized by me independently. Findings as detailed in report below>

## 2020-02-13 NOTE — PROGRESS NOTE ADULT - PROBLEM SELECTOR PLAN 1
Right IJ tunneled catheter placed 2/12. LUE AVF s/p infiltration with extensive arm edema. Will use permacath until arm swelling subsides.   Continue HD 3 times a week per primary nephrologist.

## 2020-02-13 NOTE — PROGRESS NOTE ADULT - SUBJECTIVE AND OBJECTIVE BOX
[facility] NEPHROLOGY SERVICES, [facility]  NEPHROLOGY AND HYPERTENSION  [address]  [address]  [place] [address]  [phone]    MD GIANA GUERRERO, MD JANAE MCDONNELL, MD BARBARA MONET, MD SALUD GARZA, MD CATRINA WOO MD    Resting, no CP, SOB    Vital Signs Last 24 Hrs  T(C): 36.9 (02-13-20 @ 13:42), Max: 36.9 (02-13-20 @ 13:42)  T(F): 98.5 (02-13-20 @ 13:42), Max: 98.5 (02-13-20 @ 13:42)  HR: 65 (02-13-20 @ 13:42) (60 - 69)  BP: 129/88 (02-13-20 @ 13:42) (129/88 - 153/82)  RR: 18 (02-13-20 @ 13:42) (18 - 18)  SpO2: 96% (02-13-20 @ 13:42) (95% - 98%)    Respiratory: decreased BS at bases  Cardiovascular: S1 S2  Gastrointestinal: soft NT ND +BS  Extremities:  LUE wrapped, + swollen ; b/l LE edema    acetaminophen   Tablet .. 975 milliGRAM(s) Oral every 6 hours PRN  allopurinol 100 milliGRAM(s) Oral daily  aMIOdarone    Tablet 200 milliGRAM(s) Oral daily  apixaban 5 milliGRAM(s) Oral two times a day  benzocaine 15 mG/menthol 3.6 mG (Sugar-Free) Lozenge 1 Lozenge Oral three times a day PRN  brimonidine 0.2% Ophthalmic Solution 1 Drop(s) Both EYES three times a day  chlorhexidine 2% Cloths 1 Application(s) Topical <User Schedule>  epoetin trey Injectable 60681 Unit(s) IV Push <User Schedule>  famotidine    Tablet 20 milliGRAM(s) Oral daily  gabapentin 100 milliGRAM(s) Oral every 12 hours  HYDROmorphone  Injectable 0.5 milliGRAM(s) IV Push every 6 hours PRN  latanoprost 0.005% Ophthalmic Solution 1 Drop(s) Both EYES daily  meropenem  IVPB 500 milliGRAM(s) IV Intermittent every 24 hours  metoprolol tartrate 25 milliGRAM(s) Oral every 12 hours  Nephro-candace 1 Tablet(s) Oral daily  nystatin    Suspension 790162 Unit(s) Oral four times a day  oxyCODONE    IR 5 milliGRAM(s) Oral every 4 hours PRN  polyethylene glycol 3350 17 Gram(s) Oral daily  senna 2 Tablet(s) Oral at bedtime                        8.2    12.36 )-----------( 705      ( 13 Feb 2020 09:52 )             25.7     13 Feb 2020 09:52    134    |  94     |  32     ----------------------------<  86     4.2     |  24     |  6.33     Ca    9.2        13 Feb 2020 09:52  Phos  4.7       13 Feb 2020 09:52  Mg     2.2       13 Feb 2020 09:52    TPro  6.8    /  Alb  2.8    /  TBili  1.3    /  DBili  0.6    /  AST  21     /  ALT  5      /  AlkPhos  202    13 Feb 2020 09:52    LIVER FUNCTIONS - ( 13 Feb 2020 09:52 )  Alb: 2.8 g/dL / Pro: 6.8 g/dL / ALK PHOS: 202 U/L / ALT: 5 U/L / AST: 21 U/L / GGT: x           PT/INR - ( 13 Feb 2020 09:52 )   PT: 16.5 sec;   INR: 1.43 ratio      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, DGF  S/p transplant kidney bx 12/13: ATN, focal TMA  S/p PLEX 12/13, 12/15; d/c-d 12/20 w/op HD 3 x wk  Re-adm 12/27 w/Flu A, infected carlos-nephric hematoma   S/p OR 1/1 for washout and repeat renal graft biopsy (ATN)  Dx w/RLE DVT  Developed gross hematuria  S/p OR 1/10 for infected carlos-nephric hematoma, hemorrhage involving pseudo-aneurysm of anastomosis of renal artery to external iliac vein,   s/p transplant nephrectomy, s/p IVCF   S/p PEA arrest 1/13, + PE, R retroperitoneal collection  S/p IR drainage of abd collection  RUE DVT, AC  Abx per ID  AVF infiltration 2/1 w/hematoma extending to L chest wall while on Eliquis, s/p PRBCs  S/p perm cath  HD TTS  TMP as able    [phone]

## 2020-02-13 NOTE — PROGRESS NOTE ADULT - SUBJECTIVE AND OBJECTIVE BOX
[facility] DIVISION OF KIDNEY DISEASES AND HYPERTENSION -- FOLLOW UP NOTE  --------------------------------------------------------------------------------  49 yr old man with ESRD s/p DDRT in Dec 2019 complicated by DGF due to ATN and early TMA thought to be due to prograf. Was on belatacept/MMF/Pred immunosuppression.   He was initially  admitted 6 weeks ago with influenza, course complicated by perinephric hematoma complicated by enterobacter infection requiring surgical exploration and wash out. Post op course complicated by DVT started on heparin drip, subsequent gross hematuria. He was taken to the OR and found to have dehiscence of the ureter-bladder anastomosis as well as vascular anastomosis. He underwent graft nephrectomy on 1/10/20 with bovine bio patch repair of the EIA. On 1/13/20 he had cardiac arrest due to large pulmonary embolism s/p successful resuscitation.   On 2/1 he had infiltration of his AV fistula with large hematoma.     Overnight no major events.   Seen on HD this AM. Feels well.   Eliquis started yesterday       Standing Inpatient Medications  allopurinol 100 milliGRAM(s) Oral daily  aMIOdarone    Tablet 200 milliGRAM(s) Oral daily  apixaban 5 milliGRAM(s) Oral two times a day  brimonidine 0.2% Ophthalmic Solution 1 Drop(s) Both EYES three times a day  chlorhexidine 2% Cloths 1 Application(s) Topical <User Schedule>  epoetin trey Injectable 78093 Unit(s) IV Push <User Schedule>  famotidine    Tablet 20 milliGRAM(s) Oral daily  gabapentin 100 milliGRAM(s) Oral every 12 hours  latanoprost 0.005% Ophthalmic Solution 1 Drop(s) Both EYES daily  meropenem  IVPB 500 milliGRAM(s) IV Intermittent every 24 hours  metoprolol tartrate 25 milliGRAM(s) Oral every 12 hours  Nephro-candace 1 Tablet(s) Oral daily  nystatin    Suspension 784885 Unit(s) Oral four times a day  polyethylene glycol 3350 17 Gram(s) Oral daily  senna 2 Tablet(s) Oral at bedtime    PRN Inpatient Medications  acetaminophen   Tablet .. 975 milliGRAM(s) Oral every 6 hours PRN  benzocaine 15 mG/menthol 3.6 mG (Sugar-Free) Lozenge 1 Lozenge Oral three times a day PRN  HYDROmorphone  Injectable 0.5 milliGRAM(s) IV Push every 6 hours PRN  oxyCODONE    IR 5 milliGRAM(s) Oral every 4 hours PRN      VITALS/PHYSICAL EXAM  --------------------------------------------------------------------------------  T(C): 36.9 (02-13-20 @ 13:42), Max: 36.9 (02-13-20 @ 13:42)  HR: 65 (02-13-20 @ 13:42) (60 - 69)  BP: 129/88 (02-13-20 @ 13:42) (129/88 - 153/82)  RR: 18 (02-13-20 @ 13:42) (18 - 18)  SpO2: 96% (02-13-20 @ 13:42) (95% - 98%)    02-12-20 @ 07:01  -  02-13-20 @ 07:00  --------------------------------------------------------  IN: 1070 mL / OUT: 0 mL / NET: 1070 mL    02-13-20 @ 07:01  -  02-13-20 @ 15:39  --------------------------------------------------------  IN: 530 mL / OUT: 0 mL / NET: 530 mL      Physical Exam:  	Comfortably sitting in recliner, eating breakfast              Right IJ temporary dialysis catheter present               Lungs fair air entry b/l              Regular S1 and S2               Abdomen soft, non tender              B/l LE wrapped in ACE bandage, edema still present              LUE wrapped in ACE bandage, forearm AVF with + thrill              left post shoulder large hematoma -unchanged in size               Alert and non focal         LABS/STUDIES  --------------------------------------------------------------------------------              8.2    12.36 >-----------<  705      [02-13-20 @ 09:52]              25.7     134  |  94  |  32  ----------------------------<  86      [02-13-20 @ 09:52]  4.2   |  24  |  6.33        Ca     9.2     [02-13-20 @ 09:52]      Mg     2.2     [02-13-20 @ 09:52]      Phos  4.7     [02-13-20 @ 09:52]    TPro  6.8  /  Alb  2.8  /  TBili  1.3  /  DBili  0.6  /  AST  21  /  ALT  5   /  AlkPhos  202  [02-13-20 @ 09:52]    PT/INR: PT 16.5 , INR 1.43       [02-13-20 @ 09:52]  PTT: 40.2       [02-13-20 @ 09:52]

## 2020-02-13 NOTE — PROGRESS NOTE ADULT - SUBJECTIVE AND OBJECTIVE BOX
[facility]    Melissa Kelsey, Enrique, Maximino, Marin, Hamzah, Ravi      [phone]    CHIEF COMPLAINT: Patient is a 49y old  Male who presents with a chief complaint of Fever (12 Feb 2020 17:54)      Follow Up: vt/tdp, vol ol    Interim history: The patient reports no new symptoms.  Denies chest discomfort and shortness of breath.  No abdominal pain.  No new neurologic symptoms. REmains edematous and is trying to get some sleep prior to hd      MEDICATIONS  (STANDING):  allopurinol 100 milliGRAM(s) Oral daily  aMIOdarone    Tablet 200 milliGRAM(s) Oral daily  apixaban 5 milliGRAM(s) Oral two times a day  brimonidine 0.2% Ophthalmic Solution 1 Drop(s) Both EYES three times a day  chlorhexidine 2% Cloths 1 Application(s) Topical <User Schedule>  epoetin trey Injectable 89806 Unit(s) IV Push <User Schedule>  famotidine    Tablet 20 milliGRAM(s) Oral daily  gabapentin 100 milliGRAM(s) Oral every 12 hours  latanoprost 0.005% Ophthalmic Solution 1 Drop(s) Both EYES daily  meropenem  IVPB 500 milliGRAM(s) IV Intermittent every 24 hours  metoprolol tartrate 25 milliGRAM(s) Oral every 12 hours  Nephro-candace 1 Tablet(s) Oral daily  nystatin    Suspension 855825 Unit(s) Oral four times a day  polyethylene glycol 3350 17 Gram(s) Oral daily  senna 2 Tablet(s) Oral at bedtime    MEDICATIONS  (PRN):  acetaminophen   Tablet .. 975 milliGRAM(s) Oral every 6 hours PRN Mild Pain (1 - 3)  benzocaine 15 mG/menthol 3.6 mG (Sugar-Free) Lozenge 1 Lozenge Oral three times a day PRN Sore Throat  HYDROmorphone  Injectable 0.5 milliGRAM(s) IV Push every 6 hours PRN Breakthrough Pain  oxyCODONE    IR 5 milliGRAM(s) Oral every 4 hours PRN Moderate Pain (4 - 6)      REVIEW OF SYSTEMS:  eye, ent, GI, , allergic, dermatologic, musculoskeletal and neurologic are negative except as described above    Vital Signs Last 24 Hrs  T(C): 36.4 (13 Feb 2020 05:01), Max: 36.7 (12 Feb 2020 21:00)  T(F): 97.6 (13 Feb 2020 05:01), Max: 98.1 (12 Feb 2020 21:00)  HR: 60 (13 Feb 2020 05:01) (60 - 62)  BP: 153/82 (13 Feb 2020 05:01) (131/65 - 162/76)  BP(mean): --  RR: 18 (13 Feb 2020 05:01) (18 - 18)  SpO2: 98% (13 Feb 2020 05:01) (97% - 100%)    I&O's Summary    12 Feb 2020 07:01  -  13 Feb 2020 07:00  --------------------------------------------------------  IN: 1070 mL / OUT: 0 mL / NET: 1070 mL        Telemetry past 24h:    PHYSICAL EXAM:    Constitutional: well-nourished, well-developed, NAD   HEENT:  MMM, sclerae anicteric, conjunctivae clear, no oral cyanosis. jvp prominent  Pulmonary: Non-labored, breath sounds are clear ant, No wheezing, rales or rhonchi  Cardiovascular: Regular, S1 and S2.  No murmur.  No rubs, gallops or clicks  Gastrointestinal: Bowel Sounds present, soft, nontender.   Lymph: severe peripheral edema.   Neurological: Alert, no focal deficits  Skin: No rashes.  Psych:  Mood & affect appropriate    LABS: All Labs Reviewed:                        8.2    12.36 )-----------( 705      ( 13 Feb 2020 09:52 )             25.7                         8.4    13.57 )-----------( 677      ( 12 Feb 2020 05:47 )             26.5                         8.4    13.34 )-----------( 776      ( 11 Feb 2020 05:55 )             27.2     12 Feb 2020 05:47    133    |  93     |  26     ----------------------------<  74     4.4     |  25     |  5.10   11 Feb 2020 05:55    132    |  93     |  35     ----------------------------<  98     5.0     |  24     |  6.52     Ca    9.1        12 Feb 2020 05:47  Ca    9.2        11 Feb 2020 05:55  Phos  4.1       12 Feb 2020 05:47  Phos  4.4       11 Feb 2020 05:55  Mg     2.1       12 Feb 2020 05:47  Mg     2.2       11 Feb 2020 05:55    TPro  6.7    /  Alb  2.5    /  TBili  1.3    /  DBili  0.6    /  AST  19     /  ALT  <5     /  AlkPhos  212    12 Feb 2020 05:47  TPro  6.8    /  Alb  2.6    /  TBili  1.2    /  DBili  0.5    /  AST  22     /  ALT  5      /  AlkPhos  237    11 Feb 2020 05:55    PT/INR - ( 13 Feb 2020 09:52 )   PT: 16.5 sec;   INR: 1.43 ratio         PTT - ( 13 Feb 2020 09:52 )  PTT:40.2 sec      Blood Culture:         RADIOLOGY:    EKG:    Echo:

## 2020-02-13 NOTE — PROGRESS NOTE ADULT - ASSESSMENT
49 yr old man with ESRD s/p DDRT in 12/2019 complicated post transplant course now s/p graft nephrectomy.   On hemodialysis 3 times a week. Eliquis resumed yesterday for DVT/PE

## 2020-02-13 NOTE — PROGRESS NOTE ADULT - ASSESSMENT
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 RVP with Influenza A s/p Tamiflu treatment course. Now with infected carlos-transplant hematoma.    1/1: s/p Ex-Lap and washout of infected hematoma (Enterobacter from cultures)  1/6: s/p IR guided drainage attempt of hematoma on 1/6 (NGTD from cultures)  1/10: s/p transplant nephrectomy, repair of right external iliac artery with a bovine pericardial patch, repair of right external iliac vein and thrombectomy of right lower extremity veins (Surgical cultures with Enterobacter)  1/15: s/p IR guided drainage (220cc of hematoma)  1/22: s/p IR drain repositioning and upsizing (Gram stain with GNR but NGTD)    Some of the Enterobacter isolates R and S to Ertapenem  per core lab Ertapenem at borderline of Susceptible and Resistant breakpoint  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   Given concern for intravascular infection I would treat for 6 weeks from 1/10 nephrectomy (End Date: 2/20/20)  I would continue Meropenem while admitted and switch to Ciprofloxacin on discharge    PEA arrest and Torsades on 1/13  CT C/A/P (1/13) with Saddle PE, interval increase in size of RP fluid collection (infected hematoma)  CT C/A/P (1/21) with pulmonary infarcts and residual hematoma (decreased in size)    Infiltrated LUE AVF with hematoma formation and active extravasation  Underwent IR angiography 9/3 with dissection of feeder artery with resolution of the pseudoaneurysm.    Overall, LUE Hematoma, Leukocytosis (improving), Infected Hematoma, Saddle pulmonary embolus, Renal Transplant Recipient    --Continue Meropenem to 500 mg IV Q24H (switch to Cipro PO on discharge - End Date: 2/20/20)  --Continue Nystatin for fungal PPx  --Continue to follow CBC with diff  --Continue to follow temperature curve  --Management of Saddle PE and LUE hematoma per Transplant and vascular teams    I will continue to follow. Please feel free to contact me with any further questions.    Dada Jacobo M.D.  [facility] Division of Infectious Disease  8AM-5PM: Pager Number [phone]  After Hours (or if no response): Please contact the Infectious Diseases Office at [phone]     The above assessment and plan were discussed with transplant surgery team

## 2020-02-14 ENCOUNTER — TRANSCRIPTION ENCOUNTER (OUTPATIENT)
Age: 50
End: 2020-02-14

## 2020-02-14 ENCOUNTER — INPATIENT (INPATIENT)
Facility: HOSPITAL | Age: 50
LOS: 17 days | Discharge: HOME HEALTH | DRG: 949 | End: 2020-03-03
Attending: PHYSICAL MEDICINE & REHABILITATION | Admitting: PHYSICAL MEDICINE & REHABILITATION
Payer: COMMERCIAL

## 2020-02-14 VITALS
HEIGHT: 71 IN | WEIGHT: 201.06 LBS | OXYGEN SATURATION: 97 % | DIASTOLIC BLOOD PRESSURE: 72 MMHG | TEMPERATURE: 98 F | SYSTOLIC BLOOD PRESSURE: 137 MMHG | HEART RATE: 62 BPM | RESPIRATION RATE: 14 BRPM

## 2020-02-14 VITALS
TEMPERATURE: 98 F | DIASTOLIC BLOOD PRESSURE: 74 MMHG | SYSTOLIC BLOOD PRESSURE: 147 MMHG | HEART RATE: 64 BPM | OXYGEN SATURATION: 97 % | RESPIRATION RATE: 18 BRPM

## 2020-02-14 DIAGNOSIS — J10.1 INFLUENZA DUE TO OTHER IDENTIFIED INFLUENZA VIRUS WITH OTHER RESPIRATORY MANIFESTATIONS: ICD-10-CM

## 2020-02-14 DIAGNOSIS — I82.409 ACUTE EMBOLISM AND THROMBOSIS OF UNSPECIFIED DEEP VEINS OF UNSPECIFIED LOWER EXTREMITY: ICD-10-CM

## 2020-02-14 DIAGNOSIS — I26.99 OTHER PULMONARY EMBOLISM WITHOUT ACUTE COR PULMONALE: ICD-10-CM

## 2020-02-14 DIAGNOSIS — I77.0 ARTERIOVENOUS FISTULA, ACQUIRED: Chronic | ICD-10-CM

## 2020-02-14 DIAGNOSIS — R53.81 OTHER MALAISE: ICD-10-CM

## 2020-02-14 DIAGNOSIS — T07.XXXA UNSPECIFIED MULTIPLE INJURIES, INITIAL ENCOUNTER: ICD-10-CM

## 2020-02-14 DIAGNOSIS — N18.6 END STAGE RENAL DISEASE: Chronic | ICD-10-CM

## 2020-02-14 DIAGNOSIS — Z94.0 KIDNEY TRANSPLANT STATUS: Chronic | ICD-10-CM

## 2020-02-14 DIAGNOSIS — T83.410S BREAKDOWN (MECHANICAL) OF IMPLANTED PENILE PROSTHESIS, SEQUELA: Chronic | ICD-10-CM

## 2020-02-14 DIAGNOSIS — Z98.89 OTHER SPECIFIED POSTPROCEDURAL STATES: Chronic | ICD-10-CM

## 2020-02-14 DIAGNOSIS — L02.31 CUTANEOUS ABSCESS OF BUTTOCK: Chronic | ICD-10-CM

## 2020-02-14 LAB
ALBUMIN SERPL ELPH-MCNC: 2.4 G/DL — LOW (ref 3.3–5)
ALP SERPL-CCNC: 213 U/L — HIGH (ref 40–120)
ALT FLD-CCNC: 5 U/L — LOW (ref 10–45)
ANION GAP SERPL CALC-SCNC: 14 MMOL/L — SIGNIFICANT CHANGE UP (ref 5–17)
APTT BLD: 39.6 SEC — HIGH (ref 27.5–36.3)
AST SERPL-CCNC: 26 U/L — SIGNIFICANT CHANGE UP (ref 10–40)
BILIRUB DIRECT SERPL-MCNC: 0.5 MG/DL — HIGH (ref 0–0.2)
BILIRUB INDIRECT FLD-MCNC: 0.9 MG/DL — SIGNIFICANT CHANGE UP (ref 0.2–1)
BILIRUB SERPL-MCNC: 1.4 MG/DL — HIGH (ref 0.2–1.2)
BUN SERPL-MCNC: 21 MG/DL — SIGNIFICANT CHANGE UP (ref 7–23)
CALCIUM SERPL-MCNC: 9 MG/DL — SIGNIFICANT CHANGE UP (ref 8.4–10.5)
CHLORIDE SERPL-SCNC: 96 MMOL/L — SIGNIFICANT CHANGE UP (ref 96–108)
CO2 SERPL-SCNC: 24 MMOL/L — SIGNIFICANT CHANGE UP (ref 22–31)
CREAT SERPL-MCNC: 4.77 MG/DL — HIGH (ref 0.5–1.3)
CULTURE RESULTS: SIGNIFICANT CHANGE UP
GLUCOSE SERPL-MCNC: 86 MG/DL — SIGNIFICANT CHANGE UP (ref 70–99)
HCT VFR BLD CALC: 27.4 % — LOW (ref 39–50)
HGB BLD-MCNC: 8.6 G/DL — LOW (ref 13–17)
INR BLD: 1.56 RATIO — HIGH (ref 0.88–1.16)
MAGNESIUM SERPL-MCNC: 2.1 MG/DL — SIGNIFICANT CHANGE UP (ref 1.6–2.6)
MCHC RBC-ENTMCNC: 29.5 PG — SIGNIFICANT CHANGE UP (ref 27–34)
MCHC RBC-ENTMCNC: 31.4 GM/DL — LOW (ref 32–36)
MCV RBC AUTO: 93.8 FL — SIGNIFICANT CHANGE UP (ref 80–100)
NRBC # BLD: 0 /100 WBCS — SIGNIFICANT CHANGE UP (ref 0–0)
PHOSPHATE SERPL-MCNC: 3.7 MG/DL — SIGNIFICANT CHANGE UP (ref 2.5–4.5)
PLATELET # BLD AUTO: 650 K/UL — HIGH (ref 150–400)
POTASSIUM SERPL-MCNC: 4.1 MMOL/L — SIGNIFICANT CHANGE UP (ref 3.5–5.3)
POTASSIUM SERPL-SCNC: 4.1 MMOL/L — SIGNIFICANT CHANGE UP (ref 3.5–5.3)
PROCALCITONIN SERPL-MCNC: 1.02 NG/ML — HIGH (ref 0.02–0.1)
PROT SERPL-MCNC: 6.5 G/DL — SIGNIFICANT CHANGE UP (ref 6–8.3)
PROTHROM AB SERPL-ACNC: 18 SEC — HIGH (ref 10–12.9)
RBC # BLD: 2.92 M/UL — LOW (ref 4.2–5.8)
RBC # FLD: 15.4 % — HIGH (ref 10.3–14.5)
SODIUM SERPL-SCNC: 134 MMOL/L — LOW (ref 135–145)
SPECIMEN SOURCE: SIGNIFICANT CHANGE UP
WBC # BLD: 13.45 K/UL — HIGH (ref 3.8–10.5)
WBC # FLD AUTO: 13.45 K/UL — HIGH (ref 3.8–10.5)

## 2020-02-14 PROCEDURE — 36556 INSERT NON-TUNNEL CV CATH: CPT

## 2020-02-14 PROCEDURE — 75984 XRAY CONTROL CATHETER CHANGE: CPT

## 2020-02-14 PROCEDURE — 85730 THROMBOPLASTIN TIME PARTIAL: CPT

## 2020-02-14 PROCEDURE — 87186 SC STD MICRODIL/AGAR DIL: CPT

## 2020-02-14 PROCEDURE — 82330 ASSAY OF CALCIUM: CPT

## 2020-02-14 PROCEDURE — 94002 VENT MGMT INPAT INIT DAY: CPT

## 2020-02-14 PROCEDURE — 82435 ASSAY OF BLOOD CHLORIDE: CPT

## 2020-02-14 PROCEDURE — 87449 NOS EACH ORGANISM AG IA: CPT

## 2020-02-14 PROCEDURE — 93990 DOPPLER FLOW TESTING: CPT

## 2020-02-14 PROCEDURE — C1887: CPT

## 2020-02-14 PROCEDURE — 80048 BASIC METABOLIC PNL TOTAL CA: CPT

## 2020-02-14 PROCEDURE — 70450 CT HEAD/BRAIN W/O DYE: CPT

## 2020-02-14 PROCEDURE — 36246 INS CATH ABD/L-EXT ART 2ND: CPT

## 2020-02-14 PROCEDURE — 99232 SBSQ HOSP IP/OBS MODERATE 35: CPT

## 2020-02-14 PROCEDURE — 77001 FLUOROGUIDE FOR VEIN DEVICE: CPT

## 2020-02-14 PROCEDURE — 93971 EXTREMITY STUDY: CPT

## 2020-02-14 PROCEDURE — 97164 PT RE-EVAL EST PLAN CARE: CPT

## 2020-02-14 PROCEDURE — 83605 ASSAY OF LACTIC ACID: CPT

## 2020-02-14 PROCEDURE — 36430 TRANSFUSION BLD/BLD COMPNT: CPT

## 2020-02-14 PROCEDURE — 84100 ASSAY OF PHOSPHORUS: CPT

## 2020-02-14 PROCEDURE — C1769: CPT

## 2020-02-14 PROCEDURE — 88346 IMFLUOR 1ST 1ANTB STAIN PX: CPT

## 2020-02-14 PROCEDURE — 82570 ASSAY OF URINE CREATININE: CPT

## 2020-02-14 PROCEDURE — 74177 CT ABD & PELVIS W/CONTRAST: CPT

## 2020-02-14 PROCEDURE — 76937 US GUIDE VASCULAR ACCESS: CPT

## 2020-02-14 PROCEDURE — 97162 PT EVAL MOD COMPLEX 30 MIN: CPT

## 2020-02-14 PROCEDURE — 80202 ASSAY OF VANCOMYCIN: CPT

## 2020-02-14 PROCEDURE — 86923 COMPATIBILITY TEST ELECTRIC: CPT

## 2020-02-14 PROCEDURE — 83010 ASSAY OF HAPTOGLOBIN QUANT: CPT

## 2020-02-14 PROCEDURE — 85027 COMPLETE CBC AUTOMATED: CPT

## 2020-02-14 PROCEDURE — 82947 ASSAY GLUCOSE BLOOD QUANT: CPT

## 2020-02-14 PROCEDURE — 36558 INSERT TUNNELED CV CATH: CPT

## 2020-02-14 PROCEDURE — 86803 HEPATITIS C AB TEST: CPT

## 2020-02-14 PROCEDURE — 87799 DETECT AGENT NOS DNA QUANT: CPT

## 2020-02-14 PROCEDURE — 80150 ASSAY OF AMIKACIN: CPT

## 2020-02-14 PROCEDURE — 99261: CPT

## 2020-02-14 PROCEDURE — 97602 WOUND(S) CARE NON-SELECTIVE: CPT

## 2020-02-14 PROCEDURE — 88341 IMHCHEM/IMCYTCHM EA ADD ANTB: CPT

## 2020-02-14 PROCEDURE — 93005 ELECTROCARDIOGRAM TRACING: CPT

## 2020-02-14 PROCEDURE — 97530 THERAPEUTIC ACTIVITIES: CPT

## 2020-02-14 PROCEDURE — 83735 ASSAY OF MAGNESIUM: CPT

## 2020-02-14 PROCEDURE — 37244 VASC EMBOLIZE/OCCLUDE BLEED: CPT

## 2020-02-14 PROCEDURE — 86850 RBC ANTIBODY SCREEN: CPT

## 2020-02-14 PROCEDURE — 86704 HEP B CORE ANTIBODY TOTAL: CPT

## 2020-02-14 PROCEDURE — 80076 HEPATIC FUNCTION PANEL: CPT

## 2020-02-14 PROCEDURE — 84484 ASSAY OF TROPONIN QUANT: CPT

## 2020-02-14 PROCEDURE — 86706 HEP B SURFACE ANTIBODY: CPT

## 2020-02-14 PROCEDURE — 87102 FUNGUS ISOLATION CULTURE: CPT

## 2020-02-14 PROCEDURE — 97605 NEG PRS WND THER DME<=50SQCM: CPT

## 2020-02-14 PROCEDURE — 97161 PT EVAL LOW COMPLEX 20 MIN: CPT

## 2020-02-14 PROCEDURE — C1760: CPT

## 2020-02-14 PROCEDURE — 88312 SPECIAL STAINS GROUP 1: CPT

## 2020-02-14 PROCEDURE — 86900 BLOOD TYPING SEROLOGIC ABO: CPT

## 2020-02-14 PROCEDURE — 85652 RBC SED RATE AUTOMATED: CPT

## 2020-02-14 PROCEDURE — 83935 ASSAY OF URINE OSMOLALITY: CPT

## 2020-02-14 PROCEDURE — 84145 PROCALCITONIN (PCT): CPT

## 2020-02-14 PROCEDURE — 94003 VENT MGMT INPAT SUBQ DAY: CPT

## 2020-02-14 PROCEDURE — 88342 IMHCHEM/IMCYTCHM 1ST ANTB: CPT

## 2020-02-14 PROCEDURE — 71045 X-RAY EXAM CHEST 1 VIEW: CPT

## 2020-02-14 PROCEDURE — 87581 M.PNEUMON DNA AMP PROBE: CPT

## 2020-02-14 PROCEDURE — C1750: CPT

## 2020-02-14 PROCEDURE — P9011: CPT

## 2020-02-14 PROCEDURE — C1874: CPT

## 2020-02-14 PROCEDURE — 87205 SMEAR GRAM STAIN: CPT

## 2020-02-14 PROCEDURE — 87880 STREP A ASSAY W/OPTIC: CPT

## 2020-02-14 PROCEDURE — 49423 EXCHANGE DRAINAGE CATHETER: CPT

## 2020-02-14 PROCEDURE — 97116 GAIT TRAINING THERAPY: CPT

## 2020-02-14 PROCEDURE — 71275 CT ANGIOGRAPHY CHEST: CPT

## 2020-02-14 PROCEDURE — 87633 RESP VIRUS 12-25 TARGETS: CPT

## 2020-02-14 PROCEDURE — C1729: CPT

## 2020-02-14 PROCEDURE — 75710 ARTERY X-RAYS ARM/LEG: CPT

## 2020-02-14 PROCEDURE — 93970 EXTREMITY STUDY: CPT

## 2020-02-14 PROCEDURE — 87040 BLOOD CULTURE FOR BACTERIA: CPT

## 2020-02-14 PROCEDURE — 84300 ASSAY OF URINE SODIUM: CPT

## 2020-02-14 PROCEDURE — 83036 HEMOGLOBIN GLYCOSYLATED A1C: CPT

## 2020-02-14 PROCEDURE — 86140 C-REACTIVE PROTEIN: CPT

## 2020-02-14 PROCEDURE — 87081 CULTURE SCREEN ONLY: CPT

## 2020-02-14 PROCEDURE — 88305 TISSUE EXAM BY PATHOLOGIST: CPT

## 2020-02-14 PROCEDURE — 37191 INS ENDOVAS VENA CAVA FILTR: CPT

## 2020-02-14 PROCEDURE — 76705 ECHO EXAM OF ABDOMEN: CPT

## 2020-02-14 PROCEDURE — 83550 IRON BINDING TEST: CPT

## 2020-02-14 PROCEDURE — 88307 TISSUE EXAM BY PATHOLOGIST: CPT

## 2020-02-14 PROCEDURE — 87086 URINE CULTURE/COLONY COUNT: CPT

## 2020-02-14 PROCEDURE — 99285 EMERGENCY DEPT VISIT HI MDM: CPT | Mod: 25

## 2020-02-14 PROCEDURE — 36217 PLACE CATHETER IN ARTERY: CPT

## 2020-02-14 PROCEDURE — 71260 CT THORAX DX C+: CPT

## 2020-02-14 PROCEDURE — 82553 CREATINE MB FRACTION: CPT

## 2020-02-14 PROCEDURE — 84295 ASSAY OF SERUM SODIUM: CPT

## 2020-02-14 PROCEDURE — 87070 CULTURE OTHR SPECIMN AEROBIC: CPT

## 2020-02-14 PROCEDURE — 94770: CPT

## 2020-02-14 PROCEDURE — C8924: CPT

## 2020-02-14 PROCEDURE — 81001 URINALYSIS AUTO W/SCOPE: CPT

## 2020-02-14 PROCEDURE — 80197 ASSAY OF TACROLIMUS: CPT

## 2020-02-14 PROCEDURE — 80053 COMPREHEN METABOLIC PANEL: CPT

## 2020-02-14 PROCEDURE — 36557 INSERT TUNNELED CV CATH: CPT

## 2020-02-14 PROCEDURE — 83615 LACTATE (LD) (LDH) ENZYME: CPT

## 2020-02-14 PROCEDURE — 82746 ASSAY OF FOLIC ACID SERUM: CPT

## 2020-02-14 PROCEDURE — 97110 THERAPEUTIC EXERCISES: CPT

## 2020-02-14 PROCEDURE — P9040: CPT

## 2020-02-14 PROCEDURE — 77012 CT SCAN FOR NEEDLE BIOPSY: CPT

## 2020-02-14 PROCEDURE — 97166 OT EVAL MOD COMPLEX 45 MIN: CPT

## 2020-02-14 PROCEDURE — 82550 ASSAY OF CK (CPK): CPT

## 2020-02-14 PROCEDURE — 87798 DETECT AGENT NOS DNA AMP: CPT

## 2020-02-14 PROCEDURE — C1894: CPT

## 2020-02-14 PROCEDURE — 82565 ASSAY OF CREATININE: CPT

## 2020-02-14 PROCEDURE — 85610 PROTHROMBIN TIME: CPT

## 2020-02-14 PROCEDURE — 82962 GLUCOSE BLOOD TEST: CPT

## 2020-02-14 PROCEDURE — 84132 ASSAY OF SERUM POTASSIUM: CPT

## 2020-02-14 PROCEDURE — 87340 HEPATITIS B SURFACE AG IA: CPT

## 2020-02-14 PROCEDURE — 87075 CULTR BACTERIA EXCEPT BLOOD: CPT

## 2020-02-14 PROCEDURE — C1880: CPT

## 2020-02-14 PROCEDURE — P9045: CPT

## 2020-02-14 PROCEDURE — 93321 DOPPLER ECHO F-UP/LMTD STD: CPT

## 2020-02-14 PROCEDURE — 73206 CT ANGIO UPR EXTRM W/O&W/DYE: CPT

## 2020-02-14 PROCEDURE — 83540 ASSAY OF IRON: CPT

## 2020-02-14 PROCEDURE — 86901 BLOOD TYPING SEROLOGIC RH(D): CPT

## 2020-02-14 PROCEDURE — 49406 IMAGE CATH FLUID PERI/RETRO: CPT

## 2020-02-14 PROCEDURE — 88313 SPECIAL STAINS GROUP 2: CPT

## 2020-02-14 PROCEDURE — 82803 BLOOD GASES ANY COMBINATION: CPT

## 2020-02-14 PROCEDURE — 85045 AUTOMATED RETICULOCYTE COUNT: CPT

## 2020-02-14 PROCEDURE — 93306 TTE W/DOPPLER COMPLETE: CPT

## 2020-02-14 PROCEDURE — 87486 CHLMYD PNEUM DNA AMP PROBE: CPT

## 2020-02-14 PROCEDURE — 74176 CT ABD & PELVIS W/O CONTRAST: CPT

## 2020-02-14 PROCEDURE — 85014 HEMATOCRIT: CPT

## 2020-02-14 PROCEDURE — P9037: CPT

## 2020-02-14 PROCEDURE — 10160 PNXR ASPIR ABSC HMTMA BULLA: CPT

## 2020-02-14 PROCEDURE — 88350 IMFLUOR EA ADDL 1ANTB STN PX: CPT

## 2020-02-14 PROCEDURE — C1889: CPT

## 2020-02-14 PROCEDURE — 88348 ELECTRON MICROSCOPY DX: CPT

## 2020-02-14 PROCEDURE — 76776 US EXAM K TRANSPL W/DOPPLER: CPT

## 2020-02-14 PROCEDURE — 82607 VITAMIN B-12: CPT

## 2020-02-14 RX ORDER — METOPROLOL TARTRATE 50 MG
25 TABLET ORAL EVERY 12 HOURS
Refills: 0 | Status: DISCONTINUED | OUTPATIENT
Start: 2020-02-14 | End: 2020-03-03

## 2020-02-14 RX ORDER — METOPROLOL TARTRATE 50 MG
25 TABLET ORAL EVERY 12 HOURS
Refills: 0 | Status: DISCONTINUED | OUTPATIENT
Start: 2020-02-14 | End: 2020-02-14

## 2020-02-14 RX ORDER — OXYCODONE HYDROCHLORIDE 5 MG/1
10 TABLET ORAL EVERY 4 HOURS
Refills: 0 | Status: DISCONTINUED | OUTPATIENT
Start: 2020-02-14 | End: 2020-02-20

## 2020-02-14 RX ORDER — CIPROFLOXACIN LACTATE 400MG/40ML
500 VIAL (ML) INTRAVENOUS EVERY 24 HOURS
Refills: 0 | Status: DISCONTINUED | OUTPATIENT
Start: 2020-02-15 | End: 2020-02-15

## 2020-02-14 RX ORDER — NYSTATIN 500MM UNIT
500000 POWDER (EA) MISCELLANEOUS
Refills: 0 | Status: DISCONTINUED | OUTPATIENT
Start: 2020-02-14 | End: 2020-03-03

## 2020-02-14 RX ORDER — CIPROFLOXACIN LACTATE 400MG/40ML
1 VIAL (ML) INTRAVENOUS
Qty: 0 | Refills: 0 | DISCHARGE
Start: 2020-02-14 | End: 2020-02-20

## 2020-02-14 RX ORDER — AMIODARONE HYDROCHLORIDE 400 MG/1
1 TABLET ORAL
Qty: 0 | Refills: 0 | DISCHARGE
Start: 2020-02-14

## 2020-02-14 RX ORDER — AMIODARONE HYDROCHLORIDE 400 MG/1
200 TABLET ORAL DAILY
Refills: 0 | Status: DISCONTINUED | OUTPATIENT
Start: 2020-02-14 | End: 2020-02-15

## 2020-02-14 RX ORDER — OXYCODONE HYDROCHLORIDE 5 MG/1
5 TABLET ORAL EVERY 4 HOURS
Refills: 0 | Status: DISCONTINUED | OUTPATIENT
Start: 2020-02-14 | End: 2020-02-20

## 2020-02-14 RX ORDER — MEROPENEM 1 G/30ML
500 INJECTION INTRAVENOUS EVERY 24 HOURS
Refills: 0 | Status: DISCONTINUED | OUTPATIENT
Start: 2020-02-14 | End: 2020-02-14

## 2020-02-14 RX ORDER — POLYETHYLENE GLYCOL 3350 17 G/17G
17 POWDER, FOR SOLUTION ORAL DAILY
Refills: 0 | Status: DISCONTINUED | OUTPATIENT
Start: 2020-02-14 | End: 2020-03-03

## 2020-02-14 RX ORDER — FAMOTIDINE 10 MG/ML
10 INJECTION INTRAVENOUS DAILY
Refills: 0 | Status: DISCONTINUED | OUTPATIENT
Start: 2020-02-14 | End: 2020-02-19

## 2020-02-14 RX ORDER — OXYCODONE HYDROCHLORIDE 5 MG/1
1 TABLET ORAL
Qty: 0 | Refills: 0 | DISCHARGE
Start: 2020-02-14

## 2020-02-14 RX ORDER — GABAPENTIN 400 MG/1
100 CAPSULE ORAL EVERY 12 HOURS
Refills: 0 | Status: DISCONTINUED | OUTPATIENT
Start: 2020-02-14 | End: 2020-02-15

## 2020-02-14 RX ORDER — SACCHAROMYCES BOULARDII 250 MG
250 POWDER IN PACKET (EA) ORAL
Refills: 0 | Status: DISCONTINUED | OUTPATIENT
Start: 2020-02-14 | End: 2020-03-03

## 2020-02-14 RX ORDER — BENZOCAINE AND MENTHOL 5; 1 G/100ML; G/100ML
1 LIQUID ORAL THREE TIMES A DAY
Refills: 0 | Status: DISCONTINUED | OUTPATIENT
Start: 2020-02-14 | End: 2020-03-03

## 2020-02-14 RX ORDER — APIXABAN 2.5 MG/1
1 TABLET, FILM COATED ORAL
Qty: 0 | Refills: 0 | DISCHARGE
Start: 2020-02-14

## 2020-02-14 RX ORDER — LATANOPROST 0.05 MG/ML
1 SOLUTION/ DROPS OPHTHALMIC; TOPICAL DAILY
Refills: 0 | Status: DISCONTINUED | OUTPATIENT
Start: 2020-02-14 | End: 2020-03-03

## 2020-02-14 RX ORDER — BRIMONIDINE TARTRATE 2 MG/MG
1 SOLUTION/ DROPS OPHTHALMIC THREE TIMES A DAY
Refills: 0 | Status: DISCONTINUED | OUTPATIENT
Start: 2020-02-14 | End: 2020-03-03

## 2020-02-14 RX ORDER — ERYTHROPOIETIN 10000 [IU]/ML
10000 INJECTION, SOLUTION INTRAVENOUS; SUBCUTANEOUS
Refills: 0 | Status: DISCONTINUED | OUTPATIENT
Start: 2020-02-14 | End: 2020-02-15

## 2020-02-14 RX ORDER — SENNA PLUS 8.6 MG/1
2 TABLET ORAL
Qty: 0 | Refills: 0 | DISCHARGE
Start: 2020-02-14

## 2020-02-14 RX ORDER — POLYETHYLENE GLYCOL 3350 17 G/17G
17 POWDER, FOR SOLUTION ORAL
Qty: 0 | Refills: 0 | DISCHARGE
Start: 2020-02-14

## 2020-02-14 RX ORDER — ALLOPURINOL 300 MG
100 TABLET ORAL DAILY
Refills: 0 | Status: DISCONTINUED | OUTPATIENT
Start: 2020-02-14 | End: 2020-03-03

## 2020-02-14 RX ORDER — DIPHENHYDRAMINE HCL 50 MG
25 CAPSULE ORAL ONCE
Refills: 0 | Status: COMPLETED | OUTPATIENT
Start: 2020-02-14 | End: 2020-02-14

## 2020-02-14 RX ORDER — SENNA PLUS 8.6 MG/1
2 TABLET ORAL AT BEDTIME
Refills: 0 | Status: DISCONTINUED | OUTPATIENT
Start: 2020-02-14 | End: 2020-03-03

## 2020-02-14 RX ORDER — CIPROFLOXACIN LACTATE 400MG/40ML
500 VIAL (ML) INTRAVENOUS DAILY
Refills: 0 | Status: DISCONTINUED | OUTPATIENT
Start: 2020-02-14 | End: 2020-02-14

## 2020-02-14 RX ORDER — APIXABAN 2.5 MG/1
5 TABLET, FILM COATED ORAL
Refills: 0 | Status: DISCONTINUED | OUTPATIENT
Start: 2020-02-14 | End: 2020-03-03

## 2020-02-14 RX ORDER — ACETAMINOPHEN 500 MG
975 TABLET ORAL EVERY 6 HOURS
Refills: 0 | Status: DISCONTINUED | OUTPATIENT
Start: 2020-02-14 | End: 2020-03-03

## 2020-02-14 RX ADMIN — GABAPENTIN 100 MILLIGRAM(S): 400 CAPSULE ORAL at 06:08

## 2020-02-14 RX ADMIN — Medication 1 TABLET(S): at 12:09

## 2020-02-14 RX ADMIN — Medication 500000 UNIT(S): at 12:10

## 2020-02-14 RX ADMIN — SENNA PLUS 2 TABLET(S): 8.6 TABLET ORAL at 22:41

## 2020-02-14 RX ADMIN — OXYCODONE HYDROCHLORIDE 5 MILLIGRAM(S): 5 TABLET ORAL at 18:13

## 2020-02-14 RX ADMIN — POLYETHYLENE GLYCOL 3350 17 GRAM(S): 17 POWDER, FOR SOLUTION ORAL at 22:38

## 2020-02-14 RX ADMIN — Medication 100 MILLIGRAM(S): at 12:09

## 2020-02-14 RX ADMIN — CHLORHEXIDINE GLUCONATE 1 APPLICATION(S): 213 SOLUTION TOPICAL at 12:10

## 2020-02-14 RX ADMIN — FAMOTIDINE 20 MILLIGRAM(S): 10 INJECTION INTRAVENOUS at 12:09

## 2020-02-14 RX ADMIN — OXYCODONE HYDROCHLORIDE 5 MILLIGRAM(S): 5 TABLET ORAL at 09:59

## 2020-02-14 RX ADMIN — Medication 25 MILLIGRAM(S): at 06:08

## 2020-02-14 RX ADMIN — BRIMONIDINE TARTRATE 1 DROP(S): 2 SOLUTION/ DROPS OPHTHALMIC at 06:08

## 2020-02-14 RX ADMIN — OXYCODONE HYDROCHLORIDE 5 MILLIGRAM(S): 5 TABLET ORAL at 09:29

## 2020-02-14 RX ADMIN — OXYCODONE HYDROCHLORIDE 10 MILLIGRAM(S): 5 TABLET ORAL at 22:30

## 2020-02-14 RX ADMIN — APIXABAN 5 MILLIGRAM(S): 2.5 TABLET, FILM COATED ORAL at 06:08

## 2020-02-14 RX ADMIN — POLYETHYLENE GLYCOL 3350 17 GRAM(S): 17 POWDER, FOR SOLUTION ORAL at 12:09

## 2020-02-14 RX ADMIN — Medication 500000 UNIT(S): at 06:08

## 2020-02-14 RX ADMIN — MEROPENEM 100 MILLIGRAM(S): 1 INJECTION INTRAVENOUS at 14:02

## 2020-02-14 RX ADMIN — OXYCODONE HYDROCHLORIDE 10 MILLIGRAM(S): 5 TABLET ORAL at 23:00

## 2020-02-14 RX ADMIN — BRIMONIDINE TARTRATE 1 DROP(S): 2 SOLUTION/ DROPS OPHTHALMIC at 22:43

## 2020-02-14 RX ADMIN — APIXABAN 5 MILLIGRAM(S): 2.5 TABLET, FILM COATED ORAL at 22:38

## 2020-02-14 RX ADMIN — LATANOPROST 1 DROP(S): 0.05 SOLUTION/ DROPS OPHTHALMIC; TOPICAL at 22:49

## 2020-02-14 RX ADMIN — AMIODARONE HYDROCHLORIDE 200 MILLIGRAM(S): 400 TABLET ORAL at 06:08

## 2020-02-14 RX ADMIN — Medication 25 MILLIGRAM(S): at 02:02

## 2020-02-14 NOTE — PROGRESS NOTE ADULT - SUBJECTIVE AND OBJECTIVE BOX
Follow Up:  +infected hematoma    Interval History: no chills or fevers. no n/v/d     REVIEW OF SYSTEMS  [  ] ROS unobtainable because:    [ x ] All other systems negative except as noted below    Constitutional:  [ ] fever [ ] chills  [ ] weight loss  [ ] weakness  Skin:  [ ] rash [ ] phlebitis	  Eyes: [ ] icterus [ ] pain  [ ] discharge	  ENMT: [ ] sore throat  [ ] thrush [ ] ulcers [ ] exudates  Respiratory: [ ] dyspnea [ ] hemoptysis [ ] cough [ ] sputum	  Cardiovascular:  [ ] chest pain [ ] palpitations [ ] edema	  Gastrointestinal:  [ ] nausea [ ] vomiting [ ] diarrhea [ ] constipation [ ] pain	  Genitourinary:  [ ] dysuria [ ] frequency [ ] hematuria [ ] discharge [ ] flank pain  [ ] incontinence  Musculoskeletal:  [ ] myalgias [ ] arthralgias [ ] arthritis  [ ] back pain  Neurological:  [ ] headache [ ] seizures  [ ] confusion/altered mental status    Allergies  coconut (Anaphylaxis)  morphine (Pruritus; Rash)        ANTIMICROBIALS:  ciprofloxacin     Tablet 500 daily  nystatin    Suspension 940086 four times a day      OTHER MEDS:  MEDICATIONS  (STANDING):  acetaminophen   Tablet .. 975 every 6 hours PRN  allopurinol 100 daily  aMIOdarone    Tablet 200 daily  apixaban 5 two times a day  epoetin trey Injectable 65173 <User Schedule>  famotidine    Tablet 20 daily  gabapentin 100 every 12 hours  HYDROmorphone  Injectable 0.5 every 6 hours PRN  metoprolol tartrate 25 every 12 hours  oxyCODONE    IR 5 every 4 hours PRN  polyethylene glycol 3350 17 daily  senna 2 at bedtime      Vital Signs Last 24 Hrs  T(C): 36.8 (14 Feb 2020 17:53), Max: 37.4 (14 Feb 2020 09:00)  T(F): 98.3 (14 Feb 2020 17:53), Max: 99.3 (14 Feb 2020 09:00)  HR: 62 (14 Feb 2020 17:53) (61 - 70)  BP: 137/72 (14 Feb 2020 17:53) (134/75 - 152/82)  BP(mean): --  RR: 14 (14 Feb 2020 17:53) (14 - 18)  SpO2: 97% (14 Feb 2020 17:53) (95% - 97%)    PHYSICAL EXAMINATION:  General: Alert and Awake, NAD  HEENT: PERRL, EOMI  Neck: Supple  Chest: R Chest Shiley (No surrounding erythema, drainage or tenderness to palpation)  Cardiac: RRR, No M/R/G  Resp: CTAB, No Wh/Rh/Ra  Abdomen: +RLQ wound with overlying dressing, NBS, mild-moderate tenderness to palpation over RLQ  MSK: +LUE edema and pain at proximal arm. 1-2+ RLE edema. No Calf tenderness  Skin: No rashes or lesions. Skin is warm and dry to the touch.   Neuro: Alert and Awake. CN 2-12 Grossly intact. Moves all four extremities spontaneously.  Psych: Calm, Pleasant, Cooperative                        8.6    13.45 )-----------( 650      ( 14 Feb 2020 07:10 )             27.4       02-14    134<L>  |  96  |  21  ----------------------------<  86  4.1   |  24  |  4.77<H>    Ca    9.0      14 Feb 2020 07:10  Phos  3.7     02-14  Mg     2.1     02-14    TPro  6.5  /  Alb  2.4<L>  /  TBili  1.4<H>  /  DBili  0.5<H>  /  AST  26  /  ALT  5<L>  /  AlkPhos  213<H>  02-14          MICROBIOLOGY:  v  .Throat  02-12-20   No Streptococcus pyogenes (Group A) isolated  --  --      .Blood Blood  01-24-20   No growth at 5 days.  --  --      .Body Fluid RLQ Fluid Collection  01-24-20   No growth  --  --      .Blood Blood  01-24-20   No growth at 5 days.  --  --      .Body Fluid Abdominal Fluid  01-22-20   No growth at 5 days  --    polymorphonuclear leukocytes seen  Gram Negative Rods seen  by cytocentrifuge      .Blood Blood  01-21-20   No growth at 5 days.  --  --      .Blood Blood  01-21-20   No growth at 5 days.  --  --      .Sputum Sputum, trap  01-18-20   Normal Respiratory Gillian present  --    No polymorphonuclear leukocytes per low power field  Rare Squamous epithelial cells per low power field  Rare Gram positive cocci in pairs per oil power field      .Blood Blood  01-16-20   No growth at 5 days.  --  --      .Body Fluid Abdominal Fluid  01-15-20   No growth at 5 days  --    polymorphonuclear leukocytes seen  No organisms seen  by cytocentrifuge                RADIOLOGY:    <The imaging below has been reviewed and visualized by me independently. Findings as detailed in report below>    EXAM:  CT ANGIO UPR EXT (W)AW IC LT                EXAM:  CT ANGIO CHEST (W)AW IC                      PROCEDURE DATE:  02/01/2020    Large left upper extremity hematoma measuring 17.3 x 5.0 cm which extends into the left axilla with active arterial extravasation which persists on more delayed imaging.

## 2020-02-14 NOTE — H&P ADULT - NSHPPHYSICALEXAM_GEN_ALL_CORE
Physical Exam  T(C): 37.4 (02-14-20 @ 09:00), Max: 37.4 (02-13-20 @ 17:00)  HR: 61 (02-14-20 @ 11:51) (61 - 70)  BP: 134/75 (02-14-20 @ 11:51) (129/88 - 152/82)  RR: 18 (02-14-20 @ 09:00) (18 - 18)  SpO2: 96% (02-14-20 @ 11:51) (95% - 97%)    Constitutional - NAD, Comfortable  HEENT - NCAT, EOMI  Neck - Supple  Chest - CTA bilaterally  Cardiovascular - RRR, S1S2  Abdomen - BS+, Soft, NTND  Extremities - No C/C/E, No calf tenderness   Neurologic Exam -                    Cognitive - Awake, Alert, Oriented to self, place, date, year, situation     Communication - Fluent, No dysarthria     Attention - able to recite months of the year backward     Naming/Abstract -      Memory - able to recall 3/3 items immediate and -/3 after 3 minutes     Cranial Nerves - CN 2-12 grossly intact     Motor -                     LEFT    UE - ShAB 5/5, EF 5/5, EE 5/5, WE 5/5,  5/5                    RIGHT UE - ShAB 5/5, EF 5/5, EE 5/5, WE 5/5,  5/5                    LEFT    LE - HF 5/5, KE 5/5, DF 5/5, PF 5/5                    RIGHT LE - HF 5/5, KE 5/5, DF 5/5, PF 5/5        Sensory - Intact to light touch diffusely     Vestibulo-ocular - No nystagmus, no saccades     Reflexes - DTR intact and symmetrical, Negative Astudillo's bilaterally, Negative Babinski's bilaterally      Coordination - Finger-to-nose intact bilaterally   Psychiatric - Affect WNL  Skin - Physical Exam  T(C): 37.4 (02-14-20 @ 09:00), Max: 37.4 (02-13-20 @ 17:00)  HR: 61 (02-14-20 @ 11:51) (61 - 70)  BP: 134/75 (02-14-20 @ 11:51) (129/88 - 152/82)  RR: 18 (02-14-20 @ 09:00) (18 - 18)  SpO2: 96% (02-14-20 @ 11:51) (95% - 97%)    Constitutional - NAD, Comfortable  HEENT - NCAT, EOMI  Chest - CTA bilaterally, breathing comfortably on room air, right sided permacath in place c/d/i  Cardiovascular - RRR, warm, well perfused  Abdomen - BS+, distended, non-TTP  Extremities - Left axillary swelling that extends to back and chest wall, firm to touch, TTP. RUE swelling. 2-3+ BLE LE edema and flank edema (swelling improving per pt) Arthritic changes of BL hands with fingers flexed at rest. Left forearm fistula, palpable thrill    - scrotal swelling, TTP (improving per pt)   Neurologic Exam -                    Cognitive - Awake, Alert, Oriented to self, place, date, and situation. Following commands and answering questions appropriately      Communication - Fluent     Attention - able to recite days of the week backward     Naming/Abstract - intact     Memory - able to recall 3/3 items immediate and 3/3 after 3 minutes     Cranial Nerves - CN 2-12 grossly intact     Motor - Limited due to pain and edema                    LEFT    UE - ShAB 1/5, EF 1/5, EE 1/5, WE 4/5,  4/5                    RIGHT UE - ShAB 4+/5, EF 5/5, EE 5/5, WE 5/5,  5/5                    LEFT    LE - HF 3/5, KE 4/5, DF 4+/5, PF 4+/5                    RIGHT LE - HF 1/5, KE 1/5, DF 4/5, PF 4/5        Sensory - Intact to light touch diffusely     Reflexes -  Negative Babinski's bilaterally      Coordination - Finger-to-nose intact on right, limited on left due to pain and weakness  Psychiatric - Affect WNL  Skin - Left axillary wound with full thickness epidermal loss, serous-sanguineous drainage from skin tear, TTP, surrounding edema. BL foot wounds with partial epidermal loss, no drainage Physical Exam  T(C): 37.4 (02-14-20 @ 09:00), Max: 37.4 (02-13-20 @ 17:00)  HR: 61 (02-14-20 @ 11:51) (61 - 70)  BP: 134/75 (02-14-20 @ 11:51) (129/88 - 152/82)  RR: 18 (02-14-20 @ 09:00) (18 - 18)  SpO2: 96% (02-14-20 @ 11:51) (95% - 97%)    Constitutional - NAD, Comfortable  HEENT - NCAT, EOMI  Chest - CTA bilaterally, breathing comfortably on room air, right sided permacath in place c/d/i  Cardiovascular - RRR, warm, well perfused  Abdomen - BS+, distended, non-TTP  Extremities - Left upper extremity hematoma that extends to back and chest wall, firm to touch, TTP. RUE swelling. 2-3+ BLE LE edema and flank edema (swelling improving per pt) Arthritic changes of BL hands with fingers flexed at rest. Left forearm fistula, palpable thrill    - scrotal swelling, TTP (improving per pt)   Neurologic Exam -                    Cognitive - Awake, Alert, Oriented to self, place, date, and situation. Following commands and answering questions appropriately      Communication - Fluent     Attention - able to recite days of the week backward     Naming/Abstract - intact     Memory - able to recall 3/3 items immediate and 3/3 after 3 minutes     Cranial Nerves - CN 2-12 grossly intact     Motor - Limited due to pain and edema                    LEFT    UE - ShAB 1/5, EF 1/5, EE 1/5, WE 4/5,  4/5                    RIGHT UE - ShAB 4+/5, EF 5/5, EE 5/5, WE 5/5,  5/5                    LEFT    LE - HF 3/5, KE 4/5, DF 4+/5, PF 4+/5                    RIGHT LE - HF 1/5, KE 1/5, DF 4/5, PF 4/5        Sensory - Intact to light touch diffusely     Reflexes -  Negative Babinski's bilaterally      Coordination - Finger-to-nose intact on right, limited on left due to pain and weakness  Psychiatric - Affect WNL  Skin - Left axillary wound with partial thickness epidermal loss, serous-sanguineous drainage from skin tear, TTP, surrounding edema. BL foot wounds with partial epidermal loss, no drainage. Right groin wound Physical Exam  T(C): 37.4 (02-14-20 @ 09:00), Max: 37.4 (02-13-20 @ 17:00)  HR: 61 (02-14-20 @ 11:51) (61 - 70)  BP: 134/75 (02-14-20 @ 11:51) (129/88 - 152/82)  RR: 18 (02-14-20 @ 09:00) (18 - 18)  SpO2: 96% (02-14-20 @ 11:51) (95% - 97%)    Constitutional - NAD, eyes open, alert, follwos 1-2 step commands. Mildy fatigued but norespiratory distress, does not appear in pain  HEENT - NCAT, EOMI  Chest - CTA bilaterally, breathing comfortably on room air, right sided permacath in place c/d/i. No area of induration, warmth or TTP chest wall, no swelling  Cardiovascular - RRR, warm, well perfused  Abdomen - BS+, distended, non-TTP  Extremities - Left upper extremity hematoma that extends to back and chest wall, firm to touch, TTP. RUE swelling. 2-3+ BLE LE edema and flank edema (swelling improving per pt) Arthritic changes of BL hands with fingers flexed at rest. Left forearm fistula, palpable thrill    - scrotal swelling, TTP (improving per pt)   Neurologic Exam -                    Cognitive - Awake, Alert, Oriented to self, place, date, and situation. Following commands and answering questions appropriately      Communication - Fluent     Attention - able to recite days of the week backward     Naming/Abstract - intact     Memory - able to recall 3/3 items immediate and 3/3 after 3 minutes     Cranial Nerves - CN 2-12 grossly intact     Motor - Limited due to pain and edema                    LEFT    UE - ShAB 1/5, EF 1/5, EE 1/5, WE 4/5,  4/5                    RIGHT UE - ShAB 4+/5, EF 5/5, EE 5/5, WE 5/5,  5/5                    LEFT    LE - HF 3/5, KE 4/5, DF 4+/5, PF 4+/5                    RIGHT LE - HF 1/5, KE 1/5, DF 4/5, PF 4/5        Sensory - Intact to light touch diffusely     Reflexes -  Negative Babinski's bilaterally      Coordination - Finger-to-nose intact on right, limited on left due to pain and weakness  Psychiatric - Affect WNL  Skin - Left axillary wound with partial thickness epidermal loss, serous-sanguineous drainage from skin tear, TTP, surrounding edema. BL foot wounds with partial epidermal loss, no drainage. Right groin wound

## 2020-02-14 NOTE — PROGRESS NOTE ADULT - NSHPATTENDINGPLANDISCUSS_GEN_ALL_CORE
CHT[facility]
Dr Duran (8ICU Attending)
Dr Joy, Stefan Myers
Dr Preston Poe (Transplant Surgery)
Dr Preston Poe (Transplant), Louis Aviles (Transplant MD)
Dr Sarah Floyd (8ICU Attending)
Dr Sarah Floyd (8ICU Attending), Dr Cruz (Transplant Surgery)
Dr Shawn Espinal (transplant surgery)
Dr Velásquez (Transplant Surgery Attending), Aislinn (Transplant NP)
Dr Velásquez (Transplant Surgery Attending), Aislinn (Transplant NP)
Dr. Jama Joy (Transplant surgery),
Dr. Lawson
Dr. Preston Poe (Transplant Surgery)
Dr. Rebecca Cruz
Transplant Bre MCFARLANE
Transplant MAGALIS Myers
Transplant NP, Aislinn
Transplant NP, Zonia
transplant NP, jaun
transplant team
transplant team
Transplant MAEVE Tao
Transplant Team
Patient seen on multidisciplinary rounds with Transplant surgeons, nephrologist, NP/PA, pharmacist, and nurse.
patient and SICU staff
transplant and vascular team
transplant and vascular team
Dr. Duran
Patient seen on multidisciplinary rounds with Transplant surgeons, nephrologist, NP, pharmacist, and nurse.
Patient seen on multidisciplinary rounds with Transplant surgeons, nephrologist, NP/PA, pharmacist, and nurse.
Patient seen on multidisciplinary rounds with Transplant surgeons, nephrologist, PA, pharmacist, and nurse.
Patient seen on multidisciplinary rounds with Transplant surgeons, nephrologist, PA, pharmacist, and nurse.
Transplant
patient
patient, transplant team and nursing team
Dr. Molina
Transplant Team
patient and SICU staff
patient, transplant team and nursing team
patient, transplant team and nursing team
Dr. Dawkins
Transplant team
Transplant team, ICU team attending
Transplant team
House staff, transplant team, Vascular surgeon, SICU attending
Dr. Cruz, Dr. Lawson
Transplant team
Transplant team
transplant team, pr nephrologist
SICU attending, transplant surgery team, primary nephrologist

## 2020-02-14 NOTE — DISCHARGE NOTE NURSING/CASE MANAGEMENT/SOCIAL WORK - PATIENT PORTAL LINK FT
You can access the FollowMyHealth Patient Portal offered by [facility] by registering at the following website: [email or web address]. By joining [employer]’s FollowMyHealth portal, you will also be able to view your health information using other applications (apps) compatible with our system.

## 2020-02-14 NOTE — PROGRESS NOTE ADULT - SUBJECTIVE AND OBJECTIVE BOX
Transplant Surgery - Multidisciplinary Rounds  --------------------------------------------------------------  R DDRT (failed)    Date:  12/8/19     Present:  Patient seen and examined with multidisciplinary team including Transplant Surgeon: Dr. Cruz, Dr. Espinal, Dr. Be, Dr. Poe, Transplant Nephrologist: Dr. Tamayo.  Pharmacist: Diana, ACPs: Gilbert, and Dino, PGY 3: Belkys and Davonte during AM rounds. Disciplines not in attendance will be notified of the plan.     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 Young, followed by Dr. Raphael, [facility] Oncologist), RA with hand contractures. He underwent DDRT on 12/8/19. Post op course c/b DGF requiring HD, thrombocytopenia, elevated LDH and haptoglobin. Schistocytes  on peripheral smear concerning for TMA. Envarsus held. Received Belatacept 12/13. Started on PLEX (12/12, 12/15). Underwent renal bx on 12/13 c/w profound ATN.  Discharged home on 12/20/19 w/ outpatient HD.     Re-admitted 12/27 with influenza, completed treatement with Tamiflu on 12/31. Urine cx on admission grew Ent cloacae, was started on meropenem 12/31. Renal US on admission with increasing hematoma.   OR 1/1 for ex-lap, hematoma evacuation, washout and renal bx. (c/w ATN)  OR cxs grew CRE and e coli. Post op course c/b:   fevers and leukocytosis, CT a/p (1/4) with increased perinephric collection. Underwent IR guided collection 1/6   New onset hematuria, martinez inserted 1/7, started CBI  RLE edema, RLE doppler (1/8) with acute above the knee thrombus of R external iliac, common femoral and femoral veins, Acute calf vein thrombus affecting R soleal vein, Heparin gtt initiated   AMS 1/9  (head CT neg), hyponatremic  Significant hematuria and bleeding around the martinez. Angio 1/9 showed actively bleeding pseudoaneurysm at the anastomosis of the transplant renal artery with the recipient iliac artery. A stent was placed in the iliac artery to  control hemorrhage. IVC filter placed.   Emergent OR 1/9-1/10 for graft nephrectomy;  ureter to bladder anastomosis was completely disrupted, repaired the R external iliac artery with bovine pericardial patch, repaired right external iliac vein, performed thrombectomy of RLE veins, Intra op received 3u PRBC.   Extubated 1/11 1/13 Saddle PE, with right heart strain and complicated by PEA arrest followed by Torsade, ROSC achieved after ACLS; re-intubated  1/15 Renal US with 13.7 x 5.0 x 1.5 cm in RLQ transplant bed; s/p bedside IR guided drainage, pigtail in place. Fluid cx to date with NG  1/21 CT C/A/P showing persistent collection, catheter at superior portion of collection  1/22 IR pigtail drain repositioned and upsized to 12Fr catheter w/ increased output  1/23 Transitioned to Eliquis from Heparin drip  2/1  AVF infiltrated during HD w/ - arterial extrav/ bleeding to the left upper arm and chest noted on CTA   2/3 Left upper cavity arteriography demonstrated a tiny pseudoaneurysm arising from a distal branch of the left deep brachial artery. The tiny feeder artery was dissected resulting in resolution of the pseudoaneurysm  2/10 Wound VAc removed  2/11 Permacath placed by IR  2/12 Started Eliquis 5BID. D/C to acute rehab tomorrow bed available per Mari  C/O sore throat-lozenger, refused RVP; rapid strep neg.   2/13: Will monitor for one more day while Eliquis was just restarted    Interval events:    - Afebrile, VS stable. on TTS HD.   - On meropenem (12/31) and Micafungin (1/20 - 2/10. Fluc 1/5-1/20)   Blood cultures 1/16, 1/21, 1/24 Negative  - Still with 2-3+ LE edema, flank edema   - LUE edema with tenderness under axilla along rib cage, across left chest - improved. Skin tear under L axilla and along medial portion of LUE persist  -> tender.   - Notable swelling noted posterior to left axilla ->non-tender, firm to touch, Unchanged  - Restarted Eliquis without issue      ALLERGIES & MEDICATIONS  --------------------------------------------------------------------------------  Allergies    coconut (Anaphylaxis)  morphine (Pruritus; Rash)    Intolerances      Standing Inpatient Medications  allopurinol 100 milliGRAM(s) Oral daily  aMIOdarone    Tablet 200 milliGRAM(s) Oral daily  apixaban 5 milliGRAM(s) Oral two times a day  brimonidine 0.2% Ophthalmic Solution 1 Drop(s) Both EYES three times a day  chlorhexidine 2% Cloths 1 Application(s) Topical <User Schedule>  epoetin trey Injectable 98577 Unit(s) IV Push <User Schedule>  famotidine    Tablet 20 milliGRAM(s) Oral daily  gabapentin 100 milliGRAM(s) Oral every 12 hours  latanoprost 0.005% Ophthalmic Solution 1 Drop(s) Both EYES daily  meropenem  IVPB 500 milliGRAM(s) IV Intermittent every 24 hours  metoprolol tartrate 25 milliGRAM(s) Oral every 12 hours  Nephro-candace 1 Tablet(s) Oral daily  nystatin    Suspension 952902 Unit(s) Oral four times a day  polyethylene glycol 3350 17 Gram(s) Oral daily  senna 2 Tablet(s) Oral at bedtime    PRN Inpatient Medications  acetaminophen   Tablet .. 975 milliGRAM(s) Oral every 6 hours PRN  benzocaine 15 mG/menthol 3.6 mG (Sugar-Free) Lozenge 1 Lozenge Oral three times a day PRN  HYDROmorphone  Injectable 0.5 milliGRAM(s) IV Push every 6 hours PRN  oxyCODONE    IR 5 milliGRAM(s) Oral every 4 hours PRN      REVIEW OF SYSTEMS  --------------------------------------------------------------------------------  Gen: + weakness, difficulty ambulating due to BLE edema  Skin: No rashes  Head/Eyes/Ears/Mouth: No headache; Normal hearing; Normal vision w/o blurriness; No sinus pain/discomfort, sore throat  Respiratory: no SOB  CV: No chest pain, PND, orthopnea  GI: no diarrhea, no constipation   : anuric  MSK: c/o LUE pain and tenderness but reports improvement.   Neuro: No dizziness/lightheadedness, weakness, seizures, numbness, tingling  Heme: No easy bruising or bleeding  Endo: No heat/cold intolerance  Psych: No significant nervousness, anxiety, stress, depression      VITALS/PHYSICAL EXAM  --------------------------------------------------------------------------------  T(C): 36.4 (02-13-20 @ 05:01), Max: 36.7 (02-12-20 @ 21:00)  HR: 60 (02-13-20 @ 05:01) (60 - 62)  BP: 153/82 (02-13-20 @ 05:01) (131/65 - 162/76)  RR: 18 (02-13-20 @ 05:01) (18 - 18)  SpO2: 98% (02-13-20 @ 05:01) (97% - 100%)  Wt(kg): --  Height (cm): 180.34 (02-11-20 @ 12:04)  Weight (kg): 63.5 (02-11-20 @ 12:04)  BMI (kg/m2): 19.5 (02-11-20 @ 12:04)  BSA (m2): 1.81 (02-11-20 @ 12:04)      02-12-20 @ 07:01  -  02-13-20 @ 07:00  --------------------------------------------------------  IN: 1070 mL / OUT: 0 mL / NET: 1070 mL      Physical Exam:  Constitutional: NAD  Eyes: Anicteric, PERRLA  Respiratory: CTA b/l  Cardiovascular: RRR  Gastrointestinal: Soft abdomen, appropriate incisional TTP, ND. RLQ wound covered with aquacell/DSD.  Genitourinary:  anuric, significant scrotal edema   Extremities: LUE and left chest edema, improved.  Notable swelling posterior to left axilla, firm to touch, no tender. Skin tear noted on medial aspect LUE and axilla - tender. 2-3+ BLE edema with ace wraps above knee. + flank edema.  Small superificial blister anterior R foot and L foot  Vascular: DP signal bilateral, L AVF palpable  Neurological: A&O x3.  Skin: no new lesions  Musculoskeletal: Moving all extremities      MEDICATIONS  (STANDING):  allopurinol 100 milliGRAM(s) Oral daily  aMIOdarone    Tablet 200 milliGRAM(s) Oral daily  apixaban 5 milliGRAM(s) Oral two times a day  brimonidine 0.2% Ophthalmic Solution 1 Drop(s) Both EYES three times a day  chlorhexidine 2% Cloths 1 Application(s) Topical <User Schedule>  epoetin trey Injectable 44096 Unit(s) IV Push <User Schedule>  famotidine    Tablet 20 milliGRAM(s) Oral daily  gabapentin 100 milliGRAM(s) Oral every 12 hours  latanoprost 0.005% Ophthalmic Solution 1 Drop(s) Both EYES daily  meropenem  IVPB 500 milliGRAM(s) IV Intermittent every 24 hours  metoprolol tartrate 25 milliGRAM(s) Oral every 12 hours  Nephro-candace 1 Tablet(s) Oral daily  nystatin    Suspension 946087 Unit(s) Oral four times a day  polyethylene glycol 3350 17 Gram(s) Oral daily  senna 2 Tablet(s) Oral at bedtime    MEDICATIONS  (PRN):  acetaminophen   Tablet .. 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  Carpal tunnel syndrome  Brain cyst: had MRI recently- now gone  Anemia  Gastric ulcer: Long time ago  Rheumatoid arthritis  Renal transplant recipient  Breakdown (mechanical) of penile (implanted) prosthesis, sequela  End-stage renal disease (ESRD): PERMACATH PLACEMENT  Abscess of left buttock: s/p I &amp; D  AV fistula: placement left lower arm  S/P cystoscopy: stent placement &amp; removal for kidney stones, lithiotripsy  s/p Lt Carpal tunnel: 2011      Vital Signs Last 24 Hrs  T(C): 37.4 (14 Feb 2020 09:00), Max: 37.4 (13 Feb 2020 17:00)  T(F): 99.3 (14 Feb 2020 09:00), Max: 99.4 (13 Feb 2020 17:00)  HR: 70 (14 Feb 2020 09:00) (65 - 70)  BP: 147/82 (14 Feb 2020 09:00) (129/88 - 152/82)  BP(mean): --  RR: 18 (14 Feb 2020 09:00) (18 - 18)  SpO2: 96% (14 Feb 2020 09:00) (95% - 97%)    I&O's Summary    13 Feb 2020 07:01  -  14 Feb 2020 07:00  --------------------------------------------------------  IN: 1310 mL / OUT: 0 mL / NET: 1310 mL                              8.6    13.45 )-----------( 650      ( 14 Feb 2020 07:10 )             27.4     02-14    134<L>  |  96  |  21  ----------------------------<  86  4.1   |  24  |  4.77<H>    Ca    9.0      14 Feb 2020 07:10  Phos  3.7     02-14  Mg     2.1     02-14    TPro  6.5  /  Alb  2.4<L>  /  TBili  1.4<H>  /  DBili  0.5<H>  /  AST  26  /  ALT  5<L>  /  AlkPhos  213<H>  02-14          Culture - Group A Streptococcus (collected 02-12-20 @ 23:18)  Source: .Throat  Preliminary Report (02-13-20 @ 18:11):    No Streptococcus pyogenes (Group A) isolated

## 2020-02-14 NOTE — DISCHARGE NOTE PROVIDER - CARE PROVIDERS DIRECT ADDRESSES
[email or web address]

## 2020-02-14 NOTE — PROGRESS NOTE ADULT - ASSESSMENT
49M PMHx of ESRD on HD  ( via LUE AVF), HTN, Gout, Glaucoma, NET of pancreas, RA with hand contractures. s/p DDRT 12/8/19,  Post op course c/b DGF requiring HD. Renal bx x 2 c/w profound ATN.     Re-admitted with Influenza and perinephric hematoma. s/p hematoma evacuation/ abd washout and renal biopsy. Post op course c/b leukocytosis, infected hematoma, acute RLE DVT, and bleeding pseudoaneurysm at txp renal artery anastomosis. Now s/p graft nephrectomy. Course further complicated by saddle PE causing right heart strain and hemodynamic instability leading to cardiac arrest requiring ACLS,  Required IR drainage of  post-op collections x 2  now complicated by bleeding following cannulation of  LUE AVF with findings of a tiny pseudoaneurysm arising from a distal branch of the left deep brachial artery s/p dissection of a tiny feeder artery that resulted in resolution of the pseudoaneurysm.    [] DDRT c/b DGF/ATN/perinephric infected hematoma/pseudoneurysm/graft nephrectomy   - S/P AVF cannulation for HD c/b infiltration causing extravasation/bleeding to the left upper arm and chest noted on CTA  - S/P IR LUE arteriography showing a tiny pseudoaneurysm arising from a distal branch of the left deep brachial artery s/p dissection of a tiny feeder artery that resulted in resolution of the pseudoaneurysm.  - S/P IR permacath placement with TTS HD  - Eliquis 5mg BID started 2/12 and hold Aspirin    - ID following;  Cont Meropenem and change to Cipro at time for discharge ->  End date 2/20.    - pain control  - bowel regimen  -on pepcid.   - PT/OT/OOB.    - RLQ Wound granulating. Aquacel dressing changes daily   - Thigh high compression stocking/ACE wrap to bilateral LE    - ACE wrap LUE from hand to shoulder.       [] Saddle PE, with right heart strain and complicated by PEA arrest followed by Torsade, ROSC achieved after ACLS  - vascular, CT and vascular cardiology following   -Repeat chest CT 1/28 noted Persistent bilateral lower lobe pulmonary emboli.   - CT 1/21: There are pulmonary emboli within the right lower lobar arteries and the left lower lobar arteries extending into the distal segments as seen on prior study. The pulmonary emboli seen in the right and left pulmonary arteries are no longer visualized. New distal to mid brachial DVT noted  - 1/19 Duplex: RUE Deep vein thrombosis in the mid to distal brachial vein.  - repeat ECHO with improved R heart strain  - eliquis 5mg BID    [] New Afib/Vtech  - Cardiology following, BB low dose in the setting of WCT and hold BB on HD days  - Continue amiodarone, card plan to continue for couple of months and will f/u as outpt    [] New acute DVT of R distal brachia vein , New acute DVT of R external iliac, common femoral, femoral veins, R soleal vein   - IVC filter placed 1/9. Eliquis 5mg bid    [] Thrombocytosis  - PLT appropriate given course, Heme consulted; likely reactive.   - Hold ASA   - eliquis resumed    [] Dispo  -discharge to Acute Rehab today

## 2020-02-14 NOTE — PROGRESS NOTE ADULT - ASSESSMENT
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 RVP with Influenza A s/p Tamiflu treatment course. Now with infected carlos-transplant hematoma.    1/1: s/p Ex-Lap and washout of infected hematoma (Enterobacter from cultures)  1/6: s/p IR guided drainage attempt of hematoma on 1/6 (NGTD from cultures)  1/10: s/p transplant nephrectomy, repair of right external iliac artery with a bovine pericardial patch, repair of right external iliac vein and thrombectomy of right lower extremity veins (Surgical cultures with Enterobacter)  1/15: s/p IR guided drainage (220cc of hematoma)  1/22: s/p IR drain repositioning and upsizing (Gram stain with GNR but NGTD)    Some of the Enterobacter isolates R and S to Ertapenem  per core lab Ertapenem at borderline of Susceptible and Resistant breakpoint  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   Given concern for intravascular infection I would treat for 6 weeks from 1/10 nephrectomy (End Date: 2/20/20)  I would continue Meropenem while admitted and switch to Ciprofloxacin on discharge    PEA arrest and Torsades on 1/13  CT C/A/P (1/13) with Saddle PE, interval increase in size of RP fluid collection (infected hematoma)  CT C/A/P (1/21) with pulmonary infarcts and residual hematoma (decreased in size)    Infiltrated LUE AVF with hematoma formation and active extravasation  Underwent IR angiography 9/3 with dissection of feeder artery with resolution of the pseudoaneurysm.  s/p R Chest Shiley Placement  Leukocytosis stable    Overall, LUE Hematoma, Leukocytosis (stable), Infected Hematoma, Saddle pulmonary embolus, Renal Transplant Recipient    --Continue Meropenem to 500 mg IV Q24H (switch to Cipro 500 mg PO Q24H on discharge - End Date: 2/20/20)  --Continue Nystatin for fungal PPx  --Continue to follow CBC with diff  --Continue to follow temperature curve  --Management of Saddle PE and LUE hematoma per Transplant and vascular teams    I will continue to follow. Please feel free to contact me with any further questions.    Dada Jacobo M.D.  [facility] Division of Infectious Disease  8AM-5PM: Pager Number [phone]  After Hours (or if no response): Please contact the Infectious Diseases Office at [phone]

## 2020-02-14 NOTE — PROGRESS NOTE ADULT - ATTENDING COMMENTS
Kidney Transplant recipient s/p nephrectomy   DVT, PE, edema of access arm, s/p tunneled catheter placement  Comorbidities reviewed.  Patient seen, examined and reviewed available clinical and lab data including history,  progress notes and consult notes.  Reviewed immunosuppression and allograft function including urine out put, creatinine trend, urine studies and any allograft and bladder imaging.  Reviewed medication regimen for glycemic control and blood pressure control  Suggestions:  Hemodialysis by primary nephrology team at rehab on discharge  I was present during and reviewed clinical and lab data as well as assessment and plan as documented by the housestaff as noted. Please contact if any additional questions with any change in clinical condition or on availability of any additional information or reports.

## 2020-02-14 NOTE — DISCHARGE NOTE PROVIDER - NSDCMRMEDTOKEN_GEN_ALL_CORE_FT
allopurinol 100 mg oral tablet: 1 tab(s) orally once a day  amiodarone 200 mg oral tablet: 1 tab(s) orally once a day  apixaban 5 mg oral tablet: 1 tab(s) orally 2 times a day  brimonidine 0.2% ophthalmic solution: 1 drop(s) to each affected eye 3 times a day  dorzolamide 2% ophthalmic solution: 1 drop(s) to each affected eye 3 times a day  epoetin trey: to be administered with HD three times a week  famotidine 20 mg oral tablet: 1 tab(s) orally once a day  gabapentin 100 mg oral capsule: 1 cap(s) orally 2 times a day  latanoprost ophthalmic 0.005% ophthalmic solution: 1 drop(s) to each affected eye once a day (at bedtime)  metoprolol 25 mg oral tablet: 1 tab(s) orally 2 times a day  nystatin 100,000 units/mL oral suspension: 5 milliliter(s) orally 4 times a day  oxyCODONE 5 mg oral tablet: 1 tab(s) orally every 4 hours, As needed, Moderate Pain (4 - 6)  polyethylene glycol 3350 oral powder for reconstitution: 17 gram(s) orally once a day  senna oral tablet: 2 tab(s) orally once a day (at bedtime)

## 2020-02-14 NOTE — PROGRESS NOTE ADULT - PROBLEM SELECTOR PROBLEM 1
ESRD (end stage renal disease) on dialysis
Renal transplant recipient
Renal transplant recipient
ESRD (end stage renal disease) on dialysis
Renal transplant recipient
ESRD (end stage renal disease) on dialysis
Renal transplant recipient

## 2020-02-14 NOTE — H&P ADULT - ASSESSMENT
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 Young, followed by Dr. 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    COMORBIDITES/ACTIVE MEDICAL ISSUES     Gait Instability, ADL impairments and Functional impairments: start Comprehensive Rehab Program of PT/OT       Pain Mgmt - Tylenol PRN, Oxycodone PRN  GI/Bowel Mgmt -  Continent c/w Colace, Senna,  Dulcolax PRN, Miralax PRN,  /Bladder Mgmt - Continent, PVR    FEN   - Diet - Regular + Thins  [CCHO, DASH/TLC]    - Dysphagia  SLP - evaluation and treatment    Precautions / PROPHYLAXIS:   - Falls, Cardiac, Sternal, Spinal, Seizure   - ortho: Weight bearing status: WBAT   - Lungs: Aspiration, Incentive Spirometer   - Pressure injury/Skin: Turn Q2hrs while in bed, OOB to Chair, PT/OT    - DVT: Lovenox, SCDs, TEDs     MEDICAL PROGNOSIS: GOOD            REHAB POTENTIAL: GOOD             ESTIMATED DISPOSITION: HOME WITH HOME CARE            ELOS: 10-14 Days   EXPECTED THERAPY:     P.T. 1hr/day       O.T. 1hr/day      S.L.P. 1hr/day     P&O Unnecessary     EXP FREQUENCY: 5 days per 7 day period     PRESCREEN COMPARISION:   I have reviewed the prescreen information and I have found no relevant changes between the preadmission screening and my post admission evaluation     RATIONALE FOR INPATIENT ADMISSION - Patient demonstrates the following: (check all that apply)  [X] Medically appropriate for rehabilitation admission  [X] Has attainable rehab goals with an appropriate initial discharge plan  [X] Has rehabilitation potential (expected to make a significant improvement within a reasonable period of time)   [X] Requires close medical management by a rehab physician, rehab nursing care, Hospitalist and comprehensive interdisciplinary team (including PT, OT, & or SLP, Prosthetics and Orthotics) ASSESSMENT/PLAN  49M with PMHx of ESRD on HD ( via LUE AVF), HTN, Gout, Glaucoma, 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 Young, followed by Dr. 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 His course was complicated by RP hematoma and bladder perforation s/p repair and evacuation on 1/10, saddle PE s/p IVC filter on 1/10, acute DVT of RUE brachial vein, LUE AVF bleeding left deep brachial artery psuedoaneurysm, and cardiac arrest.     COMORBIDITES/ACTIVE MEDICAL ISSUES     Gait Instability, ADL impairments and Functional impairments: due to debility due to prolonged and complicated hospital course  -Start Comprehensive Rehab Program of PT/OT    DDRT: c/b DGF/ATN/perinephric infected hematoma/pseudoneurysm/graft nephrectomy    S/P AVF cannulation for HD c/b infiltration causing extravasation/bleeding to the left upper arm and chest noted on CTA  - S/P IR LUE arteriography showing a tiny pseudoaneurysm arising from a distal branch of the left deep brachial artery s/p dissection of a tiny feeder artery that resulted in resolution of the pseudoaneurysm.  - S/P IR permacath placement with TTS HD  - Eliquis 5mg BID started 2/12 and hold Aspirin    - Continue Cipro to complete antibiotic course (End date 2/20)    - pain control: Tylenol, Oxycodone, Neurontin  - RLQ Wound granulating. Aquacel dressing changes daily   - Thigh high compression stocking/ACE wrap to bilateral LE    - ACE wrap LUE from hand to shoulder.     Pain Mgmt - Tylenol PRN, Oxycodone PRN  GI/Bowel Mgmt -  Continent c/w Colace, Senna,  Dulcolax PRN, Miralax PRN,  /Bladder Mgmt - Continent, PVR    FEN   - Diet - Regular + Thins  [CCHO, DASH/TLC]    - Dysphagia  SLP - evaluation and treatment    Precautions / PROPHYLAXIS:   - Falls, Cardiac, Sternal, Spinal, Seizure   - ortho: Weight bearing status: WBAT   - Lungs: Aspiration, Incentive Spirometer   - Pressure injury/Skin: Turn Q2hrs while in bed, OOB to Chair, PT/OT    - DVT: Lovenox, SCDs, TEDs     MEDICAL PROGNOSIS: GOOD            REHAB POTENTIAL: GOOD             ESTIMATED DISPOSITION: HOME WITH HOME CARE            ELOS: 10-14 Days   EXPECTED THERAPY:     P.T. 1hr/day       O.T. 1hr/day      S.L.P. 1hr/day     P&O Unnecessary     EXP FREQUENCY: 5 days per 7 day period     PRESCREEN COMPARISION:   I have reviewed the prescreen information and I have found no relevant changes between the preadmission screening and my post admission evaluation     RATIONALE FOR INPATIENT ADMISSION - Patient demonstrates the following: (check all that apply)  [X] Medically appropriate for rehabilitation admission  [X] Has attainable rehab goals with an appropriate initial discharge plan  [X] Has rehabilitation potential (expected to make a significant improvement within a reasonable period of time)   [X] Requires close medical management by a rehab physician, rehab nursing care, Hospitalist and comprehensive interdisciplinary team (including PT, OT, & or SLP, Prosthetics and Orthotics) ASSESSMENT/PLAN  49M with PMHx of ESRD on HD ( via LUE AVF), HTN, Gout, Glaucoma, 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 Young, followed by Dr. 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 His course was complicated by RP hematoma and bladder perforation s/p repair and evacuation on 1/10, saddle PE s/p IVC filter on 1/10, acute DVT of RUE brachial vein, LUE AVF bleeding left deep brachial artery psuedoaneurysm, and cardiac arrest.     COMORBIDITES/ACTIVE MEDICAL ISSUES     Gait Instability, ADL impairments and Functional impairments: due to debility due to prolonged and complicated hospital course  -Start Comprehensive Rehab Program of PT/OT    DDRT: c/b DGF/ATN/perinephric infected hematoma/pseudoneurysm/graft nephrectomy    S/P AVF cannulation for HD c/b infiltration causing extravasation/bleeding to the left upper arm and chest noted on CTA  - S/P IR LUE arteriography showing a tiny pseudoaneurysm arising from a distal branch of the left deep brachial artery s/p dissection of a tiny feeder artery that resulted in resolution of the pseudoaneurysm.  - S/P IR permacath placement with TTS HD  - Eliquis 5mg BID started 2/12 and hold Aspirin    - Continue Cipro to complete antibiotic course (End date 2/20)    - pain control: Tylenol, Oxycodone, Neurontin  - RLQ Wound granulating. Aquacel dressing changes daily   - Thigh high compression stocking/ACE wrap to bilateral LE    - ACE wrap LUE from hand to shoulder  - Nephrology consult, appreciate recs    Saddle PE/multiple DVT: RUE brachial DVT,  R distal brachia vein , New acute DVT of R external iliac, common femoral, femoral veins, R soleal vein   - S/p IVC filter on 1/9  -Apixaban 5mg BID    New Afib/Vtach:  -Continue metoprolol  -Continue amiodarone  -Daily EKG to monitor QTc while on amiodarone    HTN:  -Continue metoprolol and hold on HD days per outside hospital    ESRD: on HD  -Nephrology consult  -Epogen, nephrovite    Glaucoma:  -Continue with drops    Gout:  -Allopurinol    RA:  -Continue with comprehensive rehab  -Continue with pain control as below    Thrombocytosis:  -Check labs in am  -Hematology follow up as an outpatient    Hyponatremia:  -Check labs in am    Anemia:  -Continue with epogen  -Check labs in am  Pain Mgmt - Tylenol PRN, Oxycodone PRN, Neurontin  GI/Bowel Mgmt -  Continent c/w  Senna,  Miralax  /Bladder Mgmt - HD    FEN   - Diet - Regular + Thins, Renal Restrictions  - famotidine    Precautions / PROPHYLAXIS:   - Falls, Cardiac  - ortho: Weight bearing status: WBAT   - Lungs: Aspiration, Incentive Spirometer   - Pressure injury/Skin: Turn Q2hrs while in bed, OOB to Chair, PT/OT    - DVT: Apixaban    MEDICAL PROGNOSIS: GOOD            REHAB POTENTIAL: GOOD             ESTIMATED DISPOSITION: HOME WITH HOME CARE            ELOS: 10-14 Days   EXPECTED THERAPY:     P.T. 1hr/day       O.T. 1hr/day      S.L.P. 1hr/day     P&O Unnecessary     EXP FREQUENCY: 5 days per 7 day period     PRESCREEN COMPARISION:   I have reviewed the prescreen information and I have found no relevant changes between the preadmission screening and my post admission evaluation     RATIONALE FOR INPATIENT ADMISSION - Patient demonstrates the following: (check all that apply)  [X] Medically appropriate for rehabilitation admission  [X] Has attainable rehab goals with an appropriate initial discharge plan  [X] Has rehabilitation potential (expected to make a significant improvement within a reasonable period of time)   [X] Requires close medical management by a rehab physician, rehab nursing care, Hospitalist and comprehensive interdisciplinary team (including PT, OT, & or SLP, Prosthetics and Orthotics) ASSESSMENT/PLAN  49M with PMHx of ESRD on HD ( via LUE AVF), HTN, Gout, Glaucoma, 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 Young, followed by Dr. 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 His course was complicated by RP hematoma and bladder perforation s/p repair and evacuation on 1/10, saddle PE s/p IVC filter on 1/10, acute DVT of RUE brachial vein, LUE AVF bleeding left deep brachial artery psuedoaneurysm, and cardiac arrest.     COMORBIDITES/ACTIVE MEDICAL ISSUES     Gait Instability, ADL impairments and Functional impairments: due to debility due to prolonged and complicated hospital course  -Start Comprehensive Rehab Program of PT/OT    DDRT: c/b DGF/ATN/perinephric infected hematoma/pseudoneurysm/graft nephrectomy    S/P AVF cannulation for HD c/b infiltration causing extravasation/bleeding to the left upper arm and chest noted on CTA  - S/P IR LUE arteriography showing a tiny pseudoaneurysm arising from a distal branch of the left deep brachial artery s/p dissection of a tiny feeder artery that resulted in resolution of the pseudoaneurysm.  - S/P IR permacath placement with TTS HD  - Eliquis 5mg BID started 2/12 and hold Aspirin    - Continue Ciprofloxacin to complete antibiotic course (End date 2/20) per outside records  - pain control: Tylenol, Oxycodone, Neurontin  - Wound care consult for multiple wounds, appreciate recs  - Thigh high compression stocking/ACE wrap to bilateral LE    - ACE wrap LUE from hand to shoulder  - Nephrology consult, appreciate recs    Saddle PE/multiple DVT: RUE brachial DVT,  R distal brachia vein , acute DVT of R external iliac, common femoral, femoral veins, R soleal vein   - S/p IVC filter on 1/9  - Apixaban 5mg BID    New Afib/Vtach:  -Continue metoprolol  -Continue amiodarone  -Daily EKG to monitor QTc while on amiodarone and ciprofloxacin    HTN:  -Continue metoprolol and hold on HD days per outside hospital    ESRD: on HD  -Nephrology consult  -Epogen, nephrovite    Glaucoma:  -Continue with drops    Gout:  -Allopurinol    RA:  -Continue with comprehensive rehab  -Continue with pain control as below    Thrombocytosis:  -Check labs in am  -Hematology follow up as an outpatient    Hyponatremia:  -Check labs in am    Anemia:  -Continue with epogen  -Check labs in am    Pain Mgmt - Tylenol PRN, Oxycodone PRN, Neurontin  GI/Bowel Mgmt -  Continent c/w  Senna,  Miralax  /Bladder Mgmt - HD    FEN   - Diet - Regular + Thins, Renal Restrictions  - famotidine    Precautions / PROPHYLAXIS:   - Falls, Cardiac  - ortho: Weight bearing status: WBAT   - Lungs: Aspiration, Incentive Spirometer   - Pressure injury/Skin: Turn Q2hrs while in bed, OOB to Chair, PT/OT    - DVT: Apixaban    MEDICAL PROGNOSIS: GOOD            REHAB POTENTIAL: GOOD             ESTIMATED DISPOSITION: HOME WITH HOME CARE            ELOS: 10-14 Days   EXPECTED THERAPY:     P.T. 1hr/day       O.T. 1hr/day      S.L.P. 1hr/day     P&O Unnecessary     EXP FREQUENCY: 5 days per 7 day period     PRESCREEN COMPARISION:   I have reviewed the prescreen information and I have found no relevant changes between the preadmission screening and my post admission evaluation     RATIONALE FOR INPATIENT ADMISSION - Patient demonstrates the following: (check all that apply)  [X] Medically appropriate for rehabilitation admission  [X] Has attainable rehab goals with an appropriate initial discharge plan  [X] Has rehabilitation potential (expected to make a significant improvement within a reasonable period of time)   [X] Requires close medical management by a rehab physician, rehab nursing care, Hospitalist and comprehensive interdisciplinary team (including PT, OT, & or SLP, Prosthetics and Orthotics)      Dr. 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 Young, followed by Dr. 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 His course was complicated by RP hematoma and bladder perforation s/p repair and evacuation on 1/10, saddle PE s/p IVC filter on 1/10, acute DVT of RUE brachial vein, LUE AVF bleeding left deep brachial artery psuedoaneurysm, and cardiac arrest.     COMORBIDITES/ACTIVE MEDICAL ISSUES     Gait Instability, ADL impairments and Functional impairments: due to debility due to prolonged and complicated hospital course  -Start Comprehensive Rehab Program of PT/OT    DDRT: c/b DGF/ATN/perinephric infected hematoma/pseudoneurysm/graft nephrectomy   - S/P AVF cannulation for HD c/b infiltration causing extravasation/bleeding to the left upper arm and chest noted on CTA  - S/P IR LUE arteriography showing a tiny pseudoaneurysm arising from a distal branch of the left deep brachial artery s/p dissection of a tiny feeder artery that resulted in resolution of the pseudoaneurysm.  - S/P IR permacath placement with TTS HD  - Eliquis 5mg BID started 2/12 and hold Aspirin    - Continue Ciprofloxacin to complete antibiotic course (End date 2/20) per outside records  - pain control: Tylenol, Oxycodone, Neurontin  - Wound care consult for multiple wounds, appreciate recs  - Thigh high compression stocking/ACE wrap to bilateral LE    - ACE wrap LUE from hand to shoulder  - Nephrology consult, appreciate recs    Saddle PE/multiple DVT: RUE brachial DVT,  R distal brachia vein , acute DVT of R external iliac, common femoral, femoral veins, R soleal vein   - S/p IVC filter on 1/9  - Apixaban 5mg BID    New Afib/Vtach:  -Continue metoprolol  -Continue amiodarone  -Daily EKG to monitor QTc while on amiodarone and ciprofloxacin    HTN:  -Continue metoprolol and hold on HD days per outside hospital    ESRD: on HD  -Nephrology consult  -Epogen, nephrovite    Glaucoma:  -Continue with drops    Gout:  -Allopurinol    RA:  -Continue with comprehensive rehab  -Continue with pain control as below    Thrombocytosis:  -Check labs in am  -Hematology follow up as an outpatient    Hyponatremia:  -Check labs in am    Anemia:  -Continue with epogen  -Check labs in am    Pain Mgmt - Tylenol PRN, Oxycodone PRN, Neurontin  GI/Bowel Mgmt -  Continent c/w  Senna,  Miralax  /Bladder Mgmt - HD    FEN   - Diet - Regular + Thins, Renal Restrictions  - famotidine    Precautions / PROPHYLAXIS:   - Falls, Cardiac  - ortho: Weight bearing status: WBAT   - Lungs: Aspiration, Incentive Spirometer   - Pressure injury/Skin: Turn Q2hrs while in bed, OOB to Chair, PT/OT    - DVT: Apixaban    MEDICAL PROGNOSIS: GOOD            REHAB POTENTIAL: GOOD             ESTIMATED DISPOSITION: HOME WITH HOME CARE            ELOS: 10-14 Days   EXPECTED THERAPY:     P.T. 2hr/day       O.T. 1hr/day      S.L.P. 0hr/day     P&O Unnecessary     EXP FREQUENCY: 5 days per 7 day period     PRESCREEN COMPARISION:   I have reviewed the prescreen information and I have found no relevant changes between the preadmission screening and my post admission evaluation     RATIONALE FOR INPATIENT ADMISSION - Patient demonstrates the following: (check all that apply)  [X] Medically appropriate for rehabilitation admission  [X] Has attainable rehab goals with an appropriate initial discharge plan  [X] Has rehabilitation potential (expected to make a significant improvement within a reasonable period of time)   [X] Requires close medical management by a rehab physician, rehab nursing care, Hospitalist and comprehensive interdisciplinary team (including PT, OT, & or SLP, Prosthetics and Orthotics)      Dr. 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 Young, followed by Dr. Raphael, 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 on 12/[facility]19 from dialysis for a fever found to be flu positive treated with tamiflu and have enterobacter infected perinephretic hematoma being admitted here  with Gait Instability, ADL impairments and Functional impairments. His course was complicated by RP hematoma and bladder perforation s/p repair and evacuation on 1/10, saddle PE s/p IVC filter on 1/10, acute DVT of RUE brachial vein, LUE AVF bleeding left deep brachial artery psuedoaneurysm, and cardiac arrest.       #ebility due to prolonged and complicated hospital course  -Start Comprehensive Rehab Program of PT/OT    #DDRT: c/b DGF/ATN/perinephric infected hematoma/pseudoneurysm/graft nephrectomy   - S/P AVF cannulation for HD c/b infiltration causing extravasation/bleeding to the left upper arm and chest noted on CTA  - S/P IR LUE arteriography showing a tiny pseudoaneurysm arising from a distal branch of the left deep brachial artery s/p dissection of a tiny feeder artery that resulted in resolution of the pseudoaneurysm.  - S/P IR permacath placement with TTS HD  - Eliquis 5mg BID started 2/12 and hold Aspirin    - Continue Ciprofloxacin to complete antibiotic course (End date 2/20) per outside records  - pain control: Tylenol, Oxycodone, Neurontin  - Wound care consult   - Thigh high compression stocking/ACE wrap to bilateral LE    - ACE wrap LUE from hand to shoulder  - Nephrology consult    #Saddle PE/multiple DVT: RUE brachial DVT,  R distal brachia vein , acute DVT of R external iliac, common femoral, femoral veins, R soleal vein   - S/p IVC filter on 1/9  - Apixaban 5mg BID  -monitor H/H, cardiopulmonary status  -hospitalist consult    #New Afib/Vtach:  -Continue metoprolol  -Continue amiodarone  -Daily EKG to monitor QTc while on amiodarone and ciprofloxacin  HR 59-83  -hospitalist consult    #HTN:  -Continue metoprolol and hold on HD days per outside hospital    #ESRD: on HD  -Nephrology consult. contacted, on T/Th. Sat  -permacath care  -Epogen, nephrovite    #Glaucoma:  -Continue with drops    #Gout:  -Allopurinol    #RA:  -Continue with comprehensive rehab  -Continue with pain control as below    #Thrombocytosis:  -Plt 650--> 619 (2/15)  -Hematology follow up as an outpatient    #Hyponatremia:  -134-->139 (2.15) improved    #Anemia:  -Continue with epogen  -Hgb 8.7 (2/15)    #Pain Mgmt   - Tylenol PRN, Oxycodone PRN, Neurontin    #GI/Bowel Mgmt   -  Continent c/w  Senna,  Miralax    #/Bladder Mgmt - HD    #FEN   - Diet - Regular + Thins, Renal Restrictions  - famotidine  -nutrition consult    Precautions / PROPHYLAXIS:   - Falls, Cardiac  - ortho: Weight bearing status: WBAT   - Lungs: Aspiration, Incentive Spirometer   - Pressure injury/Skin: Turn Q2hrs while in bed, OOB to Chair, PT/OT      MEDICAL PROGNOSIS: GOOD            REHAB POTENTIAL: GOOD             ESTIMATED DISPOSITION: HOME WITH HOME CARE            ELOS: 14-17 Days   EXPECTED THERAPY:     P.T. 2hr/day       O.T. 1hr/day      S.L.P. 0hr/day     P&O Unnecessary     EXP FREQUENCY: 5 days per 7 day period     PRESCREEN COMPARISION:   I have reviewed the prescreen information and I have found no relevant changes between the preadmission screening and my post admission evaluation     RATIONALE FOR INPATIENT ADMISSION - Patient demonstrates the following: (check all that apply)  [X] Medically appropriate for rehabilitation admission  [X] Has attainable rehab goals with an appropriate initial discharge plan  [X] Has rehabilitation potential (expected to make a significant improvement within a reasonable period of time)   [X] Requires close medical management by a rehab physician, rehab nursing care, Hospitalist and comprehensive interdisciplinary team (including PT, OT, & or SLP, Prosthetics and Orthotics)      Dr. 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 Young, followed by Dr. 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 His course was complicated by RP hematoma and bladder perforation s/p repair and evacuation on 1/10, saddle PE s/p IVC filter on 1/10, acute DVT of RUE brachial vein, LUE AVF bleeding left deep brachial artery psuedoaneurysm, and cardiac arrest.       #ebility due to prolonged and complicated hospital course  -Start Comprehensive Rehab Program of PT/OT  restorative dining added due to difficulty with container opening/hand use    #DDRT: c/b DGF/ATN/perinephric infected hematoma/pseudoneurysm/graft nephrectomy   - S/P AVF cannulation for HD c/b infiltration causing extravasation/bleeding to the left upper arm and chest noted on CTA  - S/P IR LUE arteriography showing a tiny pseudoaneurysm arising from a distal branch of the left deep brachial artery s/p dissection of a tiny feeder artery that resulted in resolution of the pseudoaneurysm.  - S/P IR permacath placement with TTS HD  - Eliquis 5mg BID started 2/12 and hold Aspirin    - Continue Ciprofloxacin to complete antibiotic course (End date 2/20) per outside records  - pain control: Tylenol, Oxycodone, Neurontin  - Wound care consult   - Thigh high compression stocking/ACE wrap to bilateral LE    - ACE wrap LUE from hand to shoulder  - Nephrology consult    #Saddle PE/multiple DVT: RUE brachial DVT,  R distal brachia vein , acute DVT of R external iliac, common femoral, femoral veins, R soleal vein   - S/p IVC filter on 1/9  - Apixaban 5mg BID  -monitor H/H, cardiopulmonary status  -hospitalist consult    #New Afib/Vtach:  -Continue metoprolol  -Continue amiodarone  -Daily EKG to monitor QTc while on amiodarone and ciprofloxacin  HR 59-83  -hospitalist consult    #HTN:  -Continue metoprolol and hold on HD days per outside hospital    #ESRD: on HD  -Nephrology consult. contacted, on T/Th. Sat  -permacath care  -Epogen, nephrovite    #Glaucoma:  -Continue with drops    #Gout:  -Allopurinol    #RA:  -Continue with comprehensive rehab  -Continue with pain control as below    #Thrombocytosis:  -Plt 650--> 619 (2/15)  -Hematology follow up as an outpatient    #Hyponatremia:  -134-->139 (2.15) improved    #Anemia:  -Continue with epogen  -Hgb 8.7 (2/15)    #Pain Mgmt   - Tylenol PRN, Oxycodone PRN, Neurontin    #GI/Bowel Mgmt   -  Continent c/w  Senna,  Miralax  -add lactulose PRN    #/Bladder Mgmt - HD    #FEN   - Diet - Regular + Thins, Renal Restrictions  - famotidine  -nutrition consult    Precautions / PROPHYLAXIS:   - Falls, Cardiac  - ortho: Weight bearing status: WBAT   - Lungs: Aspiration, Incentive Spirometer   - Pressure injury/Skin: Turn Q2hrs while in bed, OOB to Chair, PT/OT      MEDICAL PROGNOSIS: GOOD            REHAB POTENTIAL: GOOD             ESTIMATED DISPOSITION: HOME WITH HOME CARE            ELOS: 14-17 Days   EXPECTED THERAPY:     P.T. 2hr/day       O.T. 1hr/day      S.L.P. 0hr/day     P&O Unnecessary     EXP FREQUENCY: 5 days per 7 day period     PRESCREEN COMPARISION:   I have reviewed the prescreen information and I have found no relevant changes between the preadmission screening and my post admission evaluation     RATIONALE FOR INPATIENT ADMISSION - Patient demonstrates the following: (check all that apply)  [X] Medically appropriate for rehabilitation admission  [X] Has attainable rehab goals with an appropriate initial discharge plan  [X] Has rehabilitation potential (expected to make a significant improvement within a reasonable period of time)   [X] Requires close medical management by a rehab physician, rehab nursing care, Hospitalist and comprehensive interdisciplinary team (including PT, OT, & or SLP, Prosthetics and Orthotics)      Dr. Scherer made aware    LABS:  during HD  renal consult  wound care consult  hospitlaist consult

## 2020-02-14 NOTE — DISCHARGE NOTE PROVIDER - HOSPITAL COURSE
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 Young, followed by Dr. Raphael, [facility] Oncologist), RA with hand contractures. He underwent DDRT on 12/8/19. Post op course c/b DGF requiring HD.  Thrombocytopenia, elevated LDH and haptoglobin seen. Schistocytes  on peripheral smear concerning for TMA. Envarsus held. Received Belatacept 12/13. Started on PLEX (12/12, 12/15). Underwent renal bx on 12/13 c/w profound ATN.  Discharged home on 12/20/19 w/ outpatient HD.         Re-admitted on 12/17 with a prolonged and complicated hospital course.        -12/27 with influenza, completed treatement with Tamiflu on 12/31. Urine cx on admission grew Ent cloacae, was started on Meropenem 12/31. Renal US on admission with increasing hematoma.         -OR 1/1 for ex-lap, hematoma evacuation, washout and renal bx. (c/w ATN)  OR cxs grew CRE and e coli. Post op course c/b:         -CT a/p (1/4) with increased perinephric collection. Underwent IR guided collection 1/6     -New onset hematuria, martinez inserted 1/7, started CBI    -RLE edema, RLE doppler (1/8) with acute above the knee thrombus of R external iliac, common femoral and femoral veins, Acute calf vein thrombus affecting R soleal vein, Heparin gtt    -CTAngio 1/9 showed actively bleeding pseudoaneurysm at the anastomosis of the transplant renal artery with the recipient iliac artery. A stent was placed in the iliac artery to  control hemorrhage. IVC filter placed.         -Emergent OR 1/9-1/10 for graft nephrectomy;  ureter to bladder anastomosis was completely disrupted, repaired the R external iliac artery with bovine pericardial patch, repaired right external iliac vein, performed thrombectomy of RLE veins, Intra op received 3u PRBC.         -1/13 Saddle PE, with right heart strain and complicated by PEA arrest followed by T[place]ade, ROSC achieved after ACLS; re-intubated    -1/15 Renal US with 13.7 x 5.0 x 1.5 cm in RLQ transplant bed; s/p bedside IR guided drainage, pigtail in place    -1/21 CT C/A/P showing persistent collection, catheter at superior portion of collection    -1/22 IR pigtail drain repositioned and upsized to 12Fr catheter w/ increased output    -1/23 Transitioned to Eliquis from Heparin drip    -2/1  AVF infiltrated during HD w/ - arterial extrav/ bleeding to the left upper arm and chest noted on CTA     -2/3 Left upper cavity arteriography demonstrated a tiny pseudoaneurysm arising from a distal branch of the left deep brachial artery. The tiny feeder artery was dissected resulting in resolution of the pseudoaneurysm    2/10: wound vac from graft nephrectomy removed. transitioned to Aquacel daily    2/11 Permacath placed by IR for HD access in setting of AVF hematoma        He was evaluated daily by our multidisciplinary Transplant team including surgeons, nephrologists, pharmacists, ACPs, RNs, SWs, Nutrition, ICU team, Cardiologists, Vascular, PT/OT, and was deemed safe for d/c to [facility] on 2/14/2020.          He will require daily Aquacel dressing changes (with gauze or abd pad cover) and extremity ACE bandage dressings q2-3d with skin assessments.          He will continue on Ciprofloxacin until 2/20 for tx of infected hematoma, and rest of medication regimen as ordered        He will follow up with our Transplant Clinic next week and continue HD with Dr. Theresa Lawson TTS via R permacath

## 2020-02-14 NOTE — PROGRESS NOTE ADULT - SUBJECTIVE AND OBJECTIVE BOX
--------------------------------------------------------------------------------  Chief Complaint:  49 yr old man with ESRD s/p DDRT in Dec 2019 complicated by DGF due to ATN and early TMA thought to be due to prograf. Was on belatacept/MMF/Pred immunosuppression.   He was initially  admitted 6 weeks ago with influenza, course complicated by perinephric hematoma complicated by enterobacter infection requiring surgical exploration and wash out. Post op course complicated by DVT started on heparin drip, subsequent gross hematuria. He was taken to the OR and found to have dehiscence of the ureter-bladder anastomosis as well as vascular anastomosis. He underwent graft nephrectomy on 1/10/20 with bovine bio patch repair of the EIA. On 1/13/20 he had cardiac arrest due to large pulmonary embolism s/p successful resuscitation.   On 2/1 he had infiltration of his AV fistula with large hematoma.   Has tunneled catheter for dialysis access and dialyzed on 2/13/20    24 hour events/subjective:    Reviewed, no new symptoms, awaiting discharge to rehab    PAST HISTORY  --------------------------------------------------------------------------------  No significant changes to PMH, PSH, FHx, SHx, unless otherwise noted    ALLERGIES & MEDICATIONS  --------------------------------------------------------------------------------  Allergies    coconut (Anaphylaxis)  morphine (Pruritus; Rash)    Intolerances      Standing Inpatient Medications  allopurinol 100 milliGRAM(s) Oral daily  aMIOdarone    Tablet 200 milliGRAM(s) Oral daily  apixaban 5 milliGRAM(s) Oral two times a day  brimonidine 0.2% Ophthalmic Solution 1 Drop(s) Both EYES three times a day  chlorhexidine 2% Cloths 1 Application(s) Topical <User Schedule>  epoetin trey Injectable 08778 Unit(s) IV Push <User Schedule>  famotidine    Tablet 20 milliGRAM(s) Oral daily  gabapentin 100 milliGRAM(s) Oral every 12 hours  latanoprost 0.005% Ophthalmic Solution 1 Drop(s) Both EYES daily  meropenem  IVPB 500 milliGRAM(s) IV Intermittent every 24 hours  metoprolol tartrate 25 milliGRAM(s) Oral every 12 hours  Nephro-candace 1 Tablet(s) Oral daily  nystatin    Suspension 840693 Unit(s) Oral four times a day  polyethylene glycol 3350 17 Gram(s) Oral daily  senna 2 Tablet(s) Oral at bedtime    PRN Inpatient Medications  acetaminophen   Tablet .. 975 milliGRAM(s) Oral every 6 hours PRN  benzocaine 15 mG/menthol 3.6 mG (Sugar-Free) Lozenge 1 Lozenge Oral three times a day PRN  HYDROmorphone  Injectable 0.5 milliGRAM(s) IV Push every 6 hours PRN  oxyCODONE    IR 5 milliGRAM(s) Oral every 4 hours PRN      REVIEW OF SYSTEMS  --------------------------------------------------------------------------------  Gen: No fevers/chills   Skin: No rashes  Head/Eyes/Ears/Mouth: No headache;No sore throat  Respiratory: No dyspnea, cough,   CV: No chest pain, PND, orthopnea  GI: No acute abdominal pain, diarrhea, constipation, nausea, vomiting        Neuro: No dizziness/lightheadedness, weakness, seizures, numbness, tingling  Psych: No significant nervousness, anxiety, stress, depression    All other systems were reviewed and are negative, except as noted.    VITALS/PHYSICAL EXAM  --------------------------------------------------------------------------------  T(C): 37.4 (02-14-20 @ 09:00), Max: 37.4 (02-13-20 @ 17:00)  HR: 61 (02-14-20 @ 11:51) (61 - 70)  BP: 134/75 (02-14-20 @ 11:51) (129/88 - 152/82)  RR: 18 (02-14-20 @ 09:00) (18 - 18)  SpO2: 96% (02-14-20 @ 11:51) (95% - 97%)           02-13-20 @ 07:01  -  02-14-20 @ 07:00  --------------------------------------------------------  IN: 1310 mL / OUT: 0 mL / NET: 1310 mL      Physical Exam:  	Gen: No acute signs, not dyspnoeic, no distress.  	HEENT: PERRL, supple neck, clear oropharynx  	Pulm: CTA B/L  	CV: RRR, S1S2; no rub  	Back: No spinal or CVA tenderness; no sacral edema  	Abd: +BS, soft, nondistended                         	: No suprapubic tenderness              Noted ACE bandage on both LE    	Neuro: No acute focal signs  	Psych: Normal affect and mood  	Skin: Warm, without rashes      LABS/STUDIES  --------------------------------------------------------------------------------              8.6    13.45 >-----------<  650      [02-14-20 @ 07:10]              27.4     134  |  96  |  21  ----------------------------<  86      [02-14-20 @ 07:10]  4.1   |  24  |  4.77        Ca     9.0     [02-14-20 @ 07:10]      Mg     2.1     [02-14-20 @ 07:10]      Phos  3.7     [02-14-20 @ 07:10]    TPro  6.5  /  Alb  2.4  /  TBili  1.4  /  DBili  0.5  /  AST  26  /  ALT  5   /  AlkPhos  213  [02-14-20 @ 07:10]    PT/INR: PT 18.0 , INR 1.56       [02-14-20 @ 07:10]  PTT: 39.6       [02-14-20 @ 07:10]      Creatinine Trend:  SCr 4.77 [02-14 @ 07:10]  SCr 6.33 [02-13 @ 09:52]  SCr 5.10 [02-12 @ 05:47]  SCr 6.52 [02-11 @ 05:55]  SCr 5.67 [02-10 @ 06:38]          BK Virus DNA by PCR:   [facility] Assay Range: 39 copies/mL to 1.00E+10 copies/mL  The limit of quantitation (LOQ) is 39 copies/mL. BK virus DNA  detected below the LOQ will be reported as Detected:<39 copies/mL.  This test was developed and its performance characteristics  determined by [facility]. It has not been cleared or approved  by the [place] Food and Drug Administration. Results should be used in  conjunction with clinical findings, and should not form the sole  basis for a diagnosis or treatment decision.  ____________________________________________________________  Performed at:  [facility]  [address]  [place] [address]  Laura Pettit PhD HCLD(ABB)  CLIA# [id number] (01-14 @ 11:42)          Iron 23, TIBC 189, %sat 12      [01-27-20 @ 14:03]  HbA1c 5.2      [01-13-20 @ 22:59]

## 2020-02-14 NOTE — PROGRESS NOTE ADULT - I WAS PHYSICALLY PRESENT FOR THE KEY PORTIONS OF THE EVALUATION AND MANAGEMENT (E/M) SERVICE PROVIDED.  I AGREE WITH THE ABOVE HISTORY, PHYSICAL, AND PLAN WHICH I HAVE REVIEWED AND EDITED WHERE APPROPRIATE

## 2020-02-14 NOTE — DISCHARGE NOTE PROVIDER - NSDCFUADDINST_GEN_ALL_CORE_FT
no heavy lifting more than five pounds  continue wound care as instructed  continue bandage compressions as instructed to your extremities  continue your medication as prescribed

## 2020-02-14 NOTE — PROGRESS NOTE ADULT - SUBJECTIVE AND OBJECTIVE BOX
[facility] - Melissa Kelsey, Enrique, Maximino, Marin, Ravi Goodson  Office Number:  [phone]    Patient resting comfortably in bed in NAD.  Laying flat with no respiratory distress.  No complaints of chest pain, dyspnea, palpitations, PND, or orthopnea.    ROS: negative unless otherwise mentioned.    Telemetry:  sr    MEDICATIONS  (STANDING):  allopurinol 100 milliGRAM(s) Oral daily  aMIOdarone    Tablet 200 milliGRAM(s) Oral daily  apixaban 5 milliGRAM(s) Oral two times a day  brimonidine 0.2% Ophthalmic Solution 1 Drop(s) Both EYES three times a day  chlorhexidine 2% Cloths 1 Application(s) Topical <User Schedule>  epoetin trey Injectable 35661 Unit(s) IV Push <User Schedule>  famotidine    Tablet 20 milliGRAM(s) Oral daily  gabapentin 100 milliGRAM(s) Oral every 12 hours  latanoprost 0.005% Ophthalmic Solution 1 Drop(s) Both EYES daily  meropenem  IVPB 500 milliGRAM(s) IV Intermittent every 24 hours  metoprolol tartrate 25 milliGRAM(s) Oral every 12 hours  Nephro-candace 1 Tablet(s) Oral daily  nystatin    Suspension 889263 Unit(s) Oral four times a day  polyethylene glycol 3350 17 Gram(s) Oral daily  senna 2 Tablet(s) Oral at bedtime    MEDICATIONS  (PRN):  acetaminophen   Tablet .. 975 milliGRAM(s) Oral every 6 hours PRN Mild Pain (1 - 3)  benzocaine 15 mG/menthol 3.6 mG (Sugar-Free) Lozenge 1 Lozenge Oral three times a day PRN Sore Throat  HYDROmorphone  Injectable 0.5 milliGRAM(s) IV Push every 6 hours PRN Breakthrough Pain  oxyCODONE    IR 5 milliGRAM(s) Oral every 4 hours PRN Moderate Pain (4 - 6)      Allergies    coconut (Anaphylaxis)  morphine (Pruritus; Rash)    Intolerances        Vital Signs Last 24 Hrs  T(C): 37.3 (14 Feb 2020 06:02), Max: 37.4 (13 Feb 2020 17:00)  T(F): 99.2 (14 Feb 2020 06:02), Max: 99.4 (13 Feb 2020 17:00)  HR: 65 (14 Feb 2020 06:02) (61 - 69)  BP: 152/82 (14 Feb 2020 06:02) (129/88 - 152/82)  BP(mean): --  RR: 18 (14 Feb 2020 06:02) (18 - 18)  SpO2: 95% (14 Feb 2020 06:02) (95% - 97%)    I&O's Summary    13 Feb 2020 07:01  -  14 Feb 2020 07:00  --------------------------------------------------------  IN: 1310 mL / OUT: 0 mL / NET: 1310 mL        ON EXAM:  Constitutional: well-nourished, well-developed, NAD   HEENT:  MMM, sclerae anicteric, conjunctivae clear, no oral cyanosis. jvp prominent  Pulmonary: Non-labored, breath sounds are clear ant, No wheezing, rales or rhonchi  Cardiovascular: Regular, S1 and S2.  No murmur.  No rubs, gallops or clicks  Gastrointestinal: Bowel Sounds present, soft, nontender.   Lymph: improvement in peripheral edema, legs wrapped jolynn  Neurological: Alert, no focal deficits  Skin: No rashes.  Psych:  Mood & affect appropriate    LABS: All Labs Reviewed:                        8.6    13.45 )-----------( 650      ( 14 Feb 2020 07:10 )             27.4                         8.2    12.36 )-----------( 705      ( 13 Feb 2020 09:52 )             25.7                         8.4    13.57 )-----------( 677      ( 12 Feb 2020 05:47 )             26.5     13 Feb 2020 09:52    134    |  94     |  32     ----------------------------<  86     4.2     |  24     |  6.33   12 Feb 2020 05:47    133    |  93     |  26     ----------------------------<  74     4.4     |  25     |  5.10     Ca    9.2        13 Feb 2020 09:52  Ca    9.1        12 Feb 2020 05:47  Phos  4.7       13 Feb 2020 09:52  Phos  4.1       12 Feb 2020 05:47  Mg     2.2       13 Feb 2020 09:52  Mg     2.1       12 Feb 2020 05:47    TPro  6.8    /  Alb  2.8    /  TBili  1.3    /  DBili  0.6    /  AST  21     /  ALT  5      /  AlkPhos  202    13 Feb 2020 09:52  TPro  6.7    /  Alb  2.5    /  TBili  1.3    /  DBili  0.6    /  AST  19     /  ALT  <5     /  AlkPhos  212    12 Feb 2020 05:47    PT/INR - ( 14 Feb 2020 07:10 )   PT: 18.0 sec;   INR: 1.56 ratio         PTT - ( 14 Feb 2020 07:10 )  PTT:39.6 sec      Blood Culture: Organism --  Gram Stain Blood -- Gram Stain --  Specimen Source .Throat  Culture-Blood --

## 2020-02-14 NOTE — H&P ADULT - NSHPLABSRESULTS_GEN_ALL_CORE
8.6    13.45 )-----------( 650      ( 14 Feb 2020 07:10 )             27.4     02-14    134<L>  |  96  |  21  ----------------------------<  86  4.1   |  24  |  4.77<H>    Ca    9.0      14 Feb 2020 07:10  Phos  3.7     02-14  Mg     2.1     02-14    TPro  6.5  /  Alb  2.4<L>  /  TBili  1.4<H>  /  DBili  0.5<H>  /  AST  26  /  ALT  5<L>  /  AlkPhos  213<H>  02-14    PT/INR - ( 14 Feb 2020 07:10 )   PT: 18.0 sec;   INR: 1.56 ratio         PTT - ( 14 Feb 2020 07:10 )  PTT:39.6 sec 8.6    13.45 )-----------( 650      ( 14 Feb 2020 07:10 )             27.4     02-14    134<L>  |  96  |  21  ----------------------------<  86  4.1   |  24  |  4.77<H>    Ca    9.0      14 Feb 2020 07:10  Phos  3.7     02-14  Mg     2.1     02-14    TPro  6.5  /  Alb  2.4<L>  /  TBili  1.4<H>  /  DBili  0.5<H>  /  AST  26  /  ALT  5<L>  /  AlkPhos  213<H>  02-14    PT/INR - ( 14 Feb 2020 07:10 )   PT: 18.0 sec;   INR: 1.56 ratio         PTT - ( 14 Feb 2020 07:10 )  PTT:39.6 sec    RECENT LABS    Vital Signs Last 24 Hrs  T(C): 36.7 (15 Feb 2020 09:21), Max: 37 (15 Feb 2020 06:00)  T(F): 98.1 (15 Feb 2020 09:21), Max: 98.6 (15 Feb 2020 06:00)  HR: 61 (15 Feb 2020 09:21) (59 - 83)  BP: 132/73 (15 Feb 2020 09:21) (107/45 - 147/74)  BP(mean): --  RR: 14 (15 Feb 2020 09:21) (14 - 18)  SpO2: 98% (15 Feb 2020 09:21) (95% - 100%)                          8.7    11.99 )-----------( 619      ( 15 Feb 2020 06:00 )             27.1     02-15    139  |  101  |  27<H>  ----------------------------<  101<H>  4.0   |  28  |  5.96<H>    Ca    8.8      15 Feb 2020 06:00  Phos  3.7     02-14  Mg     2.1     02-14    TPro  6.9  /  Alb  1.9<L>  /  TBili  1.3<H>  /  DBili  x   /  AST  37  /  ALT  8<L>  /  AlkPhos  211<H>  02-15      Culture - Group A Streptococcus (collected 12 Feb 2020 23:18)  Source: .Throat  Final Report (14 Feb 2020 13:11):    No Streptococcus pyogenes (Group A) isolated        CAPILLARY BLOOD GLUCOSE

## 2020-02-14 NOTE — H&P ADULT - NSHPREVIEWOFSYSTEMS_GEN_ALL_CORE
REVIEW OF SYSTEMS  Constitutional: No fever, No Chills, No fatigue  HEENT: No eye pain, No visual disturbances, No difficulty hearing  Pulm: No cough,  No shortness of breath  Cardio: No chest pain, No palpitations  GI:  No abdominal pain, No nausea, No vomiting, No diarrhea, No constipation  : No dysuria, No frequency, No hematuria  Neuro: No headaches, No memory loss, No loss of strength, No numbness, No tremors  Skin: No itching, No rashes, No lesions   Endo: No temperature intolerance  MSK: No joint pain, No joint swelling, No muscle pain, No Neck or back pain  Psych:  No depression, No anxiety REVIEW OF SYSTEMS  Constitutional: No fever, +generalized fatigue  HEENT: No eye pain,  No difficulty hearing  Pulm: No cough,  No shortness of breath  Cardio: No chest pain, No palpitations  GI:  No abdominal pain, No nausea, No vomiting, Last BM two days ago  : urinates depending on fluid intake, No dysuria  Neuro: No headaches, + loss of strength, No numbness  Skin: +LUE wound, BL LE wounds  Endo: No temperature intolerance  MSK: +BL LE and UE edema and scrotal swelling, associated pain with movements  Psych:  No depression, No anxiety REVIEW OF SYSTEMS  Constitutional: No fever, +generalized fatigue  HEENT: No eye pain,  No difficulty hearing  Pulm: No cough,  No shortness of breath  Cardio: No chest pain, No palpitations  GI:  No abdominal pain, No nausea, No vomiting, Last BM 3 days prior. States went every day/every other day at home. mild discomfort. Wants oral med(pill) no suppository or liquid for now  : urinates depending on fluid intake, No dysuria. Was on HD t/Th/Sat a t home  Neuro: No headaches, + loss of strength, No numbness  Skin: +LUE wound, BL LE wounds  Endo: No temperature intolerance  MSK: +BL LE and UE edema and scrotal swelling, associated pain with movements  Psych:  No depression, No anxiety

## 2020-02-14 NOTE — DISCHARGE NOTE PROVIDER - NSDCCPCAREPLAN_GEN_ALL_CORE_FT
PRINCIPAL DISCHARGE DIAGNOSIS  Diagnosis: Renal hemorrhage of right transplanted kidney  Assessment and Plan of Treatment:

## 2020-02-14 NOTE — PROGRESS NOTE ADULT - REASON FOR ADMISSION
Fever
Flu
Fever

## 2020-02-14 NOTE — CONSULT NOTE ADULT - ASSESSMENT
Very complex recent and past medical history.  He has large fluid collect of left upper arm contiguous with left thorax and wound to right groin that does not need a wound VAC

## 2020-02-14 NOTE — DISCHARGE NOTE NURSING/CASE MANAGEMENT/SOCIAL WORK - NSDCPEWEB_GEN_ALL_CORE
[place] website --- [email or web address] for Tobacco Control website --- [email or web address]

## 2020-02-14 NOTE — CONSULT NOTE ADULT - SUBJECTIVE AND OBJECTIVE BOX
CC:  Patient is a 49y old  Male who presents with a chief complaint of Debility (14 Feb 2020 12:48)  Per patient he had a wound VAC to the right groin area and it was removed about 3 days ago and he was told he no longer needed a VAC.  For the edema left upper arm and back, he states it is getting better.    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 Young, followed by Dr. Raphael, [facility] Oncologist), RA with hand contractures who presented to [facility] on 12/27/19 from dialysis for a fever. 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 His post op course c/b DGF requiring HD, thrombocytopenia, elevated LDH and Haptoglobulin. Schistocytes  on peripheral smear concerning for TMA. Envarsus held. Received Belatacept 12/13. Started on PLEX (12/12, 12/15). Underwent renal bx on 12/13 c/w profound ATN.  Found to have much improvement to levels, likely related to Tacrolimus. He was discharged home on 12/20/19 with outpatient HD.     Patient was now sent to [facility] ED on 12/17/19 by dialysis center after he was found to be febrile to 102, he did not get HD this AM, and last full HD session was on 12/26. Upon arrival, pt was found to have the flu and positive urine culture with Enterobacter. ID was consulted and pt was started on Tamiflu and Meropenam. Transplant team and Nephrology was following patient for HD. Pt was taken to the OR on 1/1 for evacuation of perinephretic hematoma which was infected with enterobacter and biopsy of transplanted kidney. Biopsy revealed ATN. Pt was recommended to continue on Meropenam as per ID. Pt also had a CT guided pelvic fluid aspiration by IR on 1/6. Post procedure pt had significant hematuria with clots for which urology was consulted. Pt placed on CBI and hematuria resolved. Pt had multiple antibiotics and antifungals added as per ID recommendations to due to rising WBC. Pt was taken back to the OR on 1/10 for evacuation of RP hematoma removal of external iliac artery stent, bovine pericardial patch repair of ext iliac artery, and ligation of external iliac vein. Pt was emergently taken to the OR after IR for explantation of transplanted kidney and repair of bladder perforation by transplant surgery. Vascular surgery called intra-op and repaired the right external iliac artery was repaired with a bovine pericardial patch, repaired the right external iliac vein, performed a thrombectomy of the right lower extremity veins and had an IVC filter placed. He had a renal US and IR guided drain and pigtail placed on 1/15. He had CT chest/abdomen/pelvis done on 1/21, which demonstrated persistent collection and so the IR pigtail drain was repositioned on 1/22.       His course was notable on 1/8 pt was found to have RLE DVT involving ext iliac, common fem veins, s/p thromobectomy and IVC filter placement, currently on treatment. Hospital course was further complicated by cardiac arrest on 1/13 due to massive acute saddle pulmonary embolism with acute cor pulmonale and acute DVT of the brachial vein in RUE. Pt was started on Eliquis. His course was complicated on 2/1 with AVF infiltrated during HD with aterial extravasation/bleeding to the left upper arm and chest seen on CTa. On 2/3, he had an arteriography, which demonstrated a tiny pseudoaneurysm arising from a distal branch of the left deep brachial artery and so permacath was placed on 2/11. He was also noted to have newly diagnosed afib and cardiology was following for medication management. He was also noted to have thrombocytosis and hematalogy-oncology work up was done with [facility] for outpatient follow up. He was also noted to have hyponatremia, which remained stable. Patient medically stabilized and admitted here for rehab. 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  Carpal tunnel syndrome  Brain cyst: had MRI recently- now gone  Anemia  Gastric ulcer: Long time ago  Rheumatoid arthritis  Renal transplant recipient  Breakdown (mechanical) of penile (implanted) prosthesis, sequela  End-stage renal disease (ESRD): PERMACATH PLACEMENT  Abscess of left buttock: s/p I &amp; D  AV fistula: placement left lower arm  S/P cystoscopy: stent placement &amp; removal for kidney stones, lithiotripsy  s/p Lt Carpal tunnel: 2011      Allergies    coconut (Anaphylaxis)  morphine (Pruritus; Rash)    Intolerances        SOCIAL HISTORY          Smoking: Yes [ ]  No [ ]   ______pk yrs          ETOH  Yes [ ]  No [ ]  Social [ ]          DRUGS:  Yes [ ]  No [ ]  if so what______________    FAMILY HISTORY:  Family history of myocardial infarction in first degree male relative  Family history of rheumatoid arthritis  Family history of hypertension in mother  Family history of cerebral aneurysm (Sibling)      MEDICATIONS  (STANDING):  allopurinol 100 milliGRAM(s) Oral daily  aMIOdarone    Tablet 200 milliGRAM(s) Oral daily  apixaban 5 milliGRAM(s) Oral two times a day  brimonidine 0.2% Ophthalmic Solution 1 Drop(s) Both EYES three times a day  epoetin trey Injectable 78601 Unit(s) IV Push <User Schedule>  famotidine    Tablet 10 milliGRAM(s) Oral daily  gabapentin 100 milliGRAM(s) Oral every 12 hours  latanoprost 0.005% Ophthalmic Solution 1 Drop(s) Both EYES daily  metoprolol tartrate 25 milliGRAM(s) Oral every 12 hours  Nephro-candace 1 Tablet(s) Oral daily  nystatin    Suspension 486233 Unit(s) Oral four times a day  polyethylene glycol 3350 17 Gram(s) Oral daily  saccharomyces boulardii 250 milliGRAM(s) Oral two times a day  senna 2 Tablet(s) Oral at bedtime    MEDICATIONS  (PRN):  acetaminophen   Tablet .. 975 milliGRAM(s) Oral every 6 hours PRN Mild Pain (1 - 3)  benzocaine 15 mG/menthol 3.6 mG (Sugar-Free) Lozenge 1 Lozenge Oral three times a day PRN Sore Throat  oxyCODONE    IR 5 milliGRAM(s) Oral every 4 hours PRN Moderate Pain (4 - 6)  oxyCODONE    IR 10 milliGRAM(s) Oral every 4 hours PRN Severe Pain (7 - 10)         Review of systems:  General:  no fever or chills  Pulmonary:  no SOB  Cardiac:  denies chest pain, palpitations  GI:  no nausea, vomitting, or abddominal pain  :  no increase frequency, or burning  Heme:  no easy bruising with minor trauma  Musculoskeletal:  No history of back pain or trauma  Skin:  no history of rashes, injury, trauma  Neuro:   weakness         Vital Signs Last 24 Hrs  T(C): 36.6 (14 Feb 2020 19:11), Max: 37.4 (14 Feb 2020 09:00)  T(F): 97.8 (14 Feb 2020 19:11), Max: 99.3 (14 Feb 2020 09:00)  HR: 83 (14 Feb 2020 19:11) (61 - 83)  BP: 107/45 (14 Feb 2020 19:11) (107/45 - 152/82)  BP(mean): --  RR: 16 (14 Feb 2020 19:11) (14 - 18)  SpO2: 100% (14 Feb 2020 19:11) (95% - 100%)    Physical Exam: no distress  Extremities: left upper arm medially large hematoma like swelling with a 2 x 2 cm partial thickness abrasions that looks clean, in the posterior axilla there is a more linear but similar abrasion type injury.  There is also a large hematoma like fluid collection of the left upper lateral and posterior chest that appears contiguous with upper arm swelling, no open wounds seen on chest per say.   Skin: In the left groins there is a small wound about 2.5 cm x 1 cm pink and clean, very edematous left thigh         LABS:                        8.6    13.45 )-----------( 650      ( 14 Feb 2020 07:10 )             27.4     02-14    134<L>  |  96  |  21  ----------------------------<  86  4.1   |  24  |  4.77<H>    Ca    9.0      14 Feb 2020 07:10  Phos  3.7     02-14  Mg     2.1     02-14    TPro  6.5  /  Alb  2.4<L>  /  TBili  1.4<H>  /  DBili  0.5<H>  /  AST  26  /  ALT  5<L>  /  AlkPhos  213<H>  02-14    PT/INR - ( 14 Feb 2020 07:10 )   PT: 18.0 sec;   INR: 1.56 ratio         PTT - ( 14 Feb 2020 07:10 )  PTT:39.6 sec      RADIOLOGY & ADDITIONAL STUDIES:    Risks, benefits, and alternatives to treatment discussed. All questions answered with understanding.    Procedure Performed:  (  )Yes     ( x )No  Name of Procedure:      [  ]Debridement     [  ]I&D    [  ]Laceration Repair     [  ]Other:  (  )partial thickness     (  )full thickness     (  )subcutaneous     (  )muscle/tendon     (  )bone  (  )sharp     (  )surgical

## 2020-02-14 NOTE — H&P ADULT - HISTORY OF PRESENT ILLNESS
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 Young, followed by Dr. Raphael, [facility] Oncologist), RA with hand contractures who presented to [facility] on 12/27/19 from dialysis for a fever. 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 Post op course c/b DGF requiring HD, thrombocytopenia, elevated LDH and Haptoglobulin. Schistocytes  on peripheral smear concerning for TMA. Envarsus held. Received Belatacept 12/13. Started on PLEX (12/12, 12/15). Underwent renal bx on 12/13 c/w profound ATN.  Found to have much improvement to levels, likely related to Tacrolimus. He was discharged home on 12/20/19 w/ outpatient HD.     He was now sent to ED by dialysis center after he was found to be febrile to 102, he did not get HD this AM, and last full HD session was on 12/26. Upon arrival, he was febrile to 101.7, other vitals were stable. He states that he had a cough that began 5-6 days ago that has now mostly resolved. He reports no other febrile episodes other than this morning. He admits to having one SOB episode earlier this week that resolved after he got HD. He also states that his mother was hospitalized earlier this week and was flu+. Currently makes less than 1 cup urine/daily. He currently denies SOB, CP, dyspnea, HA, dizziness, N/V, diarrhea, constipation.     Pt was found to have the flu and positive urine culture with Enterobacter. ID was consulted and pt was started on Tamiflu and Meropenam. Transplant team and Nephrology was following patient for HD. Pt was taken to the OR on 1/1 for evacuation of perinephretic hematoma which was infected with enterobacter and biopsy of transplanted kidney. Biopsy revealed ATN. Pt was recommended to continue on Meropenam as per ID. Pt also had a CT guided pelvic fluid aspiration by IR on 1/6. Post procedure pt had significant hematuria with clots for which urology was consulted. Pt placed on CBI and hematuria resolved. Pt had multiple antibioics and antifungals added as per ID recommendations to due to rising WBC. Cardiology was also following patient closely. On 1/8 pt was found to have RLE DVT involving ext iliac, common fem veins. for which vascular was following and started on heparin gtt. Hospital course complicated by confusion, delirium hyponatremia. Pt was taken back to the OR on 1/10 for evacuation of RP hematoma removal of external iliac artery stent, bovine pericardial patch repair of ext iliac artery, and ligation of external iliac vein. Pt was emergently taken to the OR after IR for explantation of transplanted kidney and repair of bladder perforation by transplant surgery. Vascular surgery called intra-op and repaired the right external iliac artery was repaired with a bovine pericardial patch, repaired the right external iliac vein, performed a thrombectomy of the right lower extremity veins and had an IVC filter placed.  Hospital course was further complicated by cardiac arrest due to massive acute saddle pulmonary embolism with acute cor pulmonale and acute DVT of the brachial vein in RUE. Pt was started on Eliquis. Cardiology was following for multiple maliganat arrhythmias. 49M with PMHx of ESRD on HD ( via LUE AVF), HTN, Gout, Glaucoma, NET of pancreas (s/p robotic distal panc/splenectomy at [place] 2015 by Dr. Young, followed by Dr. Raphael, [facility] Oncologist), RA with hand contractures who presented to [facility] on 12/27/19 from dialysis for a fever. 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 His post op course c/b DGF requiring HD, thrombocytopenia, elevated LDH and Haptoglobulin. Schistocytes  on peripheral smear concerning for TMA. Envarsus held. Received Belatacept 12/13. Started on PLEX (12/12, 12/15). Underwent renal bx on 12/13 c/w profound ATN.  Found to have much improvement to levels, likely related to Tacrolimus. He was discharged home on 12/20/19 with outpatient HD.     Patient was now sent to [facility] ED on 12/17/19 by dialysis center after he was found to be febrile to 102, he did not get HD this AM, and last full HD session was on 12/26. Upon arrival, pt was found to have the flu and positive urine culture with Enterobacter. ID was consulted and pt was started on Tamiflu and Meropenam. Transplant team and Nephrology was following patient for HD. Pt was taken to the OR on 1/1 for evacuation of perinephretic hematoma which was infected with enterobacter and biopsy of transplanted kidney. Biopsy revealed ATN. Pt was recommended to continue on Meropenam as per ID. Pt also had a CT guided pelvic fluid aspiration by IR on 1/6. Post procedure pt had significant hematuria with clots for which urology was consulted. Pt placed on CBI and hematuria resolved. Pt had multiple antibiotics and antifungals added as per ID recommendations to due to rising WBC. Pt was taken back to the OR on 1/10 for evacuation of RP hematoma removal of external iliac artery stent, bovine pericardial patch repair of ext iliac artery, and ligation of external iliac vein. Pt was emergently taken to the OR after IR for explantation of transplanted kidney and repair of bladder perforation by transplant surgery. Vascular surgery called intra-op and repaired the right external iliac artery was repaired with a bovine pericardial patch, repaired the right external iliac vein, performed a thrombectomy of the right lower extremity veins and had an IVC filter placed. He had a renal US and IR guided drain and pigtail placed on 1/15. He had CT chest/abdomen/pelvis done on 1/21, which demonstrated persistent collection and so the IR pigtail drain was repositioned on 1/22.       His course was notable on 1/8 pt was found to have RLE DVT involving ext iliac, common fem veins, s/p thromobectomy and IVC filter placement, currently on treatment. Hospital course was further complicated by cardiac arrest on 1/13 due to massive acute saddle pulmonary embolism with acute cor pulmonale and acute DVT of the brachial vein in RUE. Pt was started on Eliquis. His course was complicated on 2/1 with AVF infiltrated during HD with aterial extravasation/bleeding to the left upper arm and chest seen on CTa. On 2/3, he had an arteriography, which demonstrated a tiny pseudoaneurysm arising from a distal branch of the left deep brachial artery and so permacath was placed on 2/11. He was also noted to have newly diagnosed afib and cardiology was following for medication management. He was also noted to have thrombocytosis and [facility]al[facility]. Patient medically stabilized and admitted here for rehab. 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 Young, followed by Dr. Raphael, [facility] Oncologist), RA with hand contractures who presented to [facility] on 12/27/19 from dialysis for a fever. 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 His post op course c/b DGF requiring HD, thrombocytopenia, elevated LDH and Haptoglobulin. Schistocytes  on peripheral smear concerning for TMA. Envarsus held. Received Belatacept 12/13. Started on PLEX (12/12, 12/15). Underwent renal bx on 12/13 c/w profound ATN.  Found to have much improvement to levels, likely related to Tacrolimus. He was discharged home on 12/20/19 with outpatient HD.     Patient was now sent to [facility] ED on 12/17/19 by dialysis center after he was found to be febrile to 102, he did not get HD this AM, and last full HD session was on 12/26. Upon arrival, pt was found to have the flu and positive urine culture with Enterobacter. ID was consulted and pt was started on Tamiflu and Meropenam. Transplant team and Nephrology was following patient for HD. Pt was taken to the OR on 1/1 for evacuation of perinephretic hematoma which was infected with enterobacter and biopsy of transplanted kidney. Biopsy revealed ATN. Pt was recommended to continue on Meropenam as per ID. Pt also had a CT guided pelvic fluid aspiration by IR on 1/6. Post procedure pt had significant hematuria with clots for which urology was consulted. Pt placed on CBI and hematuria resolved. Pt had multiple antibiotics and antifungals added as per ID recommendations to due to rising WBC. Pt was taken back to the OR on 1/10 for evacuation of RP hematoma removal of external iliac artery stent, bovine pericardial patch repair of ext iliac artery, and ligation of external iliac vein. Pt was emergently taken to the OR after IR for explantation of transplanted kidney and repair of bladder perforation by transplant surgery. Vascular surgery called intra-op and repaired the right external iliac artery was repaired with a bovine pericardial patch, repaired the right external iliac vein, performed a thrombectomy of the right lower extremity veins and had an IVC filter placed. He had a renal US and IR guided drain and pigtail placed on 1/15. He had CT chest/abdomen/pelvis done on 1/21, which demonstrated persistent collection and so the IR pigtail drain was repositioned on 1/22.       His course was notable on 1/8 pt was found to have RLE DVT involving ext iliac, common fem veins, s/p thromobectomy and IVC filter placement, currently on treatment. Hospital course was further complicated by cardiac arrest on 1/13 due to massive acute saddle pulmonary embolism with acute cor pulmonale and acute DVT of the brachial vein in RUE. Pt was started on Eliquis. His course was complicated on 2/1 with AVF infiltrated during HD with aterial extravasation/bleeding to the left upper arm and chest seen on CTa. On 2/3, he had an arteriography, which demonstrated a tiny pseudoaneurysm arising from a distal branch of the left deep brachial artery and so permacath was placed on 2/11. He was also noted to have newly diagnosed afib and cardiology was following for medication management. He was also noted to have thrombocytosis and hematalogy-oncology work up was done with [facility] for outpatient follow up. He was also noted to have hyponatremia, which remained stable. Patient medically stabilized and admitted here for rehab. 49M with PMHx of ESRD on HD ( via LUE AVF), HTN, Gout, Glaucoma, NET of pancreas (s/p robotic distal panc/splenectomy at [place] 2015 by Dr. Young, followed by Dr. Raphael, [facility] Oncologist), RA with hand contractures who presented to [facility] on 12/27/19 from dialysis for a fever. 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 His post op course c/b DGF requiring HD, thrombocytopenia, elevated LDH and Haptoglobulin. Schistocytes  on peripheral smear concerning for TMA. Envarsus held. Received Belatacept 12/13. Started on PLEX (12/12, 12/15). Underwent renal bx on 12/13 c/w profound ATN.  Found to have much improvement to levels, likely related to Tacrolimus. He was discharged home on 12/20/19 with outpatient HD.     Patient was now sent to [facility] ED on 12/17/19 by dialysis center after he was found to be febrile to 102, he did not get HD this AM, and last full HD session was on 12/26. Upon arrival, pt was found to have the flu and positive urine culture with Enterobacter. ID was consulted and pt was started on Tamiflu and Meropenam. Transplant team and Nephrology was following patient for HD. Pt was taken to the OR on 1/1 for evacuation of perinephretic hematoma which was infected with enterobacter and biopsy of transplanted kidney. Biopsy revealed ATN. Pt was recommended to continue on Meropenam as per ID. Pt also had a CT guided pelvic fluid aspiration by IR on 1/6. Post procedure pt had significant hematuria with clots for which urology was consulted. Pt placed on CBI and hematuria resolved. Pt had multiple antibiotics and antifungals added as per ID recommendations to due to rising WBC. Pt was taken back to the OR on 1/10 for evacuation of RP hematoma removal of external iliac artery stent, bovine pericardial patch repair of ext iliac artery, and ligation of external iliac vein. Pt was emergently taken to the OR after IR for explantation of transplanted kidney and repair of bladder perforation by transplant surgery. Vascular surgery called intra-op and repaired the right external iliac artery was repaired with a bovine pericardial patch, repaired the right external iliac vein, performed a thrombectomy of the right lower extremity veins and had an IVC filter placed. He had a renal US and IR guided drain and pigtail placed on 1/15. He had CT chest/abdomen/pelvis done on 1/21, which demonstrated persistent collection and so the IR pigtail drain was repositioned on 1/22.     His course was notable on 1/8 pt was found to have RLE DVT involving ext iliac, common fem veins, s/p thromobectomy and IVC filter placement, currently on treatment. Hospital course was further complicated by cardiac arrest on 1/13 due to massive acute saddle pulmonary embolism with acute cor pulmonale and acute DVT of the brachial vein in RUE. Pt was started on Eliquis. His course was complicated on 2/1 with AVF infiltrated during HD with aterial extravasation/bleeding to the left upper arm and chest seen on CTa. On 2/3, he had an arteriography, which demonstrated a tiny pseudoaneurysm arising from a distal branch of the left deep brachial artery and so permacath was placed on 2/11. He was also noted to have newly diagnosed afib and cardiology was following for medication management. He was also noted to have thrombocytosis and [facility]. He was also noted to have hyponatremia, which remained stable.    Patient medically stabilized and transferred to [facility] 2/14/20 for IRF services.

## 2020-02-14 NOTE — CONSULT NOTE ADULT - PROBLEM SELECTOR RECOMMENDATION 9
for the 2 wounds of the left upper arm area and the right groin area, suggest clean gently daily with normal saline, pat dry, then apply strip of aquacel ag, add few drops of normal saline as needed for aquacel to adhere to wound, cover with 2 inch gauze and paper tape, change daily

## 2020-02-14 NOTE — PROGRESS NOTE ADULT - ASSESSMENT
49 year old male with ESRD on HD, now s/p ddrt on 12/8/2019 course complicated by TMA/profound ATN, who presents from a HD center with a fever and cough. He was initially admitted with influenza. He is s/p washout of infected collection and biopsy which revealed ATN.  He was found to have diffusely enlarging perinephric fluid collection.    While on telemetry, he had an episode of a wide complex tachycardia. It initially appears irregular, suggesting an atrial arrhythmia with aberrancy, though the remainder appears more like an episode of non-sustained VT.   /p washout on 1/1/2020 with worsening sepsis and transfer to ICU, s/p IR drainage of perinephric collection on 1/6. 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 He underwent operative repair of right external iliac artery with pericardial patch, transplant nephrectomy, RLE thrombectomy, and IVC filter on 1/9.     s/p corrina ->tachy arrest with tdp, af rvr, now on amio, with large PE  He then returned to the SICU with significant lue swelling and active arterial extravasation, with drop in blood counts. He is s/p angiogram with tiny pseudoaneurysm.    - remains in sr   - had developed malignant arrhythmias which seemed to start with bradycardia, leading to more tachycardia and bursts of VT and rapid AF.  The apparent af degenerated into torsades.  He was shocked and has been maintaining SR since. Arrhythmias were most likely triggered by the massive vte event, are unlikely to represent a primary cardiac issue  - cont amio at 200 daily. cont metoprolol   - repeat ekg daily to evaluate qtc, while on antifungals/amiodarone  - hold asa     - on apixaban for his PE- trend CBC. Hb is stable  - echo with preserved/hyperdynamic lv, with RV failure consistent with acute RV overload.  Repeat echo noted, and seemingly with improved RV function  - remains with marked leg edema/volume overload. cont hd for fluid removal.    - Further cardiac workup will depend on clinical course.  - will follow with you  - d/c planning per primary team

## 2020-02-14 NOTE — DISCHARGE NOTE NURSING/CASE MANAGEMENT/SOCIAL WORK - NSDCPEEMAIL_GEN_ALL_CORE
[facility] email [email or web address]

## 2020-02-14 NOTE — H&P ADULT - NSHPOUTPATIENTPROVIDERS_GEN_ALL_CORE
Chuy Lowery (MD)  Internal Medicine; Nephrology  [address] [place] [address]  Phone: [phone]    Kolton Puckett (DO)  Nephrology  [address] [place] [address], [place] [address]  Phone: [phone]

## 2020-02-14 NOTE — PROGRESS NOTE ADULT - MINUTES
30
35
41
45
15
25
30
35
35
40
45
25
30
35
42
25
35
50
30
45
25
30
45

## 2020-02-14 NOTE — PROGRESS NOTE ADULT - ASSESSMENT
49 male with a history of renal transplant followed by multiple complications, subsequently leading to graft nephrectomy. Off of immunosuppressive medications now. Clotted AVF and now has perma cath. Presently being evaluated for HECTOR with dialysis on site. For dialysis in AM. will follow.

## 2020-02-14 NOTE — PROGRESS NOTE ADULT - PROBLEM SELECTOR PROBLEM 2
ATN (acute tubular necrosis)
Anemia
Immunosuppression
Acute deep vein thrombosis (DVT) of brachial vein of right upper extremity
Acute saddle pulmonary embolism with acute cor pulmonale
DVT (deep venous thrombosis)
Immunosuppressed status
Acute saddle pulmonary embolism with acute cor pulmonale
Immunosuppressed status

## 2020-02-14 NOTE — PROGRESS NOTE ADULT - SUBJECTIVE AND OBJECTIVE BOX
KAYLA PA  49y  Male    Patient is a 49y old  Male who presents with a chief complaint of fever (14 Feb 2020 11:06)      HPI:  49M PMHx of ESRD on HD  ( via LUE AVF), HTN, Gout, Glaucoma, NET of pancreas (s/p robotic distal panc/splenectomy at [facility] 2015 by Dr. Young, followed by Dr. Raphael, [facility] Oncologist), RA with hand contractures. 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 Post op course c/b DGF requiring HD, thrombocytopenia, elevated LDH and Haptoglobulin. Schistocytes  on peripheral smear concerning for TMA. Envarsus held. Received Belatacept 12/13. Started on PLEX (12/12, 12/15). Underwent renal bx on 12/13 c/w profound ATN.  Found to have much improvement to levels, likely related to Tacrolimus. He was discharged home on 12/20/19 w/ outpatient HD.     out of bed to chair.   feels much better.       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  Carpal tunnel syndrome  Brain cyst: had MRI recently- now gone  Anemia  Gastric ulcer: Long time ago  Rheumatoid arthritis  Renal transplant recipient  Breakdown (mechanical) of penile (implanted) prosthesis, sequela  End-stage renal disease (ESRD): PERMACATH PLACEMENT  Abscess of left buttock: s/p I &amp; D  AV fistula: placement left lower arm  S/P cystoscopy: stent placement &amp; removal for kidney stones, lithiotripsy  s/p Lt Carpal tunnel: 2011          PHYSICAL EXAM:    T(C): 36.8 (02-14-20 @ 14:23), Max: 37.4 (02-13-20 @ 17:00)  HR: 64 (02-14-20 @ 14:23) (61 - 70)  BP: 147/74 (02-14-20 @ 14:23) (134/75 - 152/82)  RR: 18 (02-14-20 @ 14:23) (18 - 18)  SpO2: 97% (02-14-20 @ 14:23) (95% - 97%)  Wt(kg): --    I&O's Detail    13 Feb 2020 07:01  -  14 Feb 2020 07:00  --------------------------------------------------------  IN:    Oral Fluid: 1260 mL    Solution: 50 mL  Total IN: 1310 mL    OUT:  Total OUT: 0 mL    Total NET: 1310 mL          Respiratory: clear anteriorly, decreased BS at bases  Cardiovascular: S1 S2  Gastrointestinal: soft NT ND +BS  Extremities: edema   Neuro: Awake and alert    MEDICATIONS  (STANDING):  allopurinol 100 milliGRAM(s) Oral daily  aMIOdarone    Tablet 200 milliGRAM(s) Oral daily  apixaban 5 milliGRAM(s) Oral two times a day  brimonidine 0.2% Ophthalmic Solution 1 Drop(s) Both EYES three times a day  chlorhexidine 2% Cloths 1 Application(s) Topical <User Schedule>  ciprofloxacin     Tablet 500 milliGRAM(s) Oral daily  epoetin trey Injectable 98586 Unit(s) IV Push <User Schedule>  famotidine    Tablet 20 milliGRAM(s) Oral daily  gabapentin 100 milliGRAM(s) Oral every 12 hours  latanoprost 0.005% Ophthalmic Solution 1 Drop(s) Both EYES daily  metoprolol tartrate 25 milliGRAM(s) Oral every 12 hours  Nephro-candace 1 Tablet(s) Oral daily  nystatin    Suspension 896341 Unit(s) Oral four times a day  polyethylene glycol 3350 17 Gram(s) Oral daily  senna 2 Tablet(s) Oral at bedtime    MEDICATIONS  (PRN):  acetaminophen   Tablet .. 975 milliGRAM(s) Oral every 6 hours PRN Mild Pain (1 - 3)  benzocaine 15 mG/menthol 3.6 mG (Sugar-Free) Lozenge 1 Lozenge Oral three times a day PRN Sore Throat  HYDROmorphone  Injectable 0.5 milliGRAM(s) IV Push every 6 hours PRN Breakthrough Pain  oxyCODONE    IR 5 milliGRAM(s) Oral every 4 hours PRN Moderate Pain (4 - 6)                            8.6    13.45 )-----------( 650      ( 14 Feb 2020 07:10 )             27.4       02-14    134<L>  |  96  |  21  ----------------------------<  86  4.1   |  24  |  4.77<H>    Ca    9.0      14 Feb 2020 07:10  Phos  3.7     02-14  Mg     2.1     02-14    TPro  6.5  /  Alb  2.4<L>  /  TBili  1.4<H>  /  DBili  0.5<H>  /  AST  26  /  ALT  5<L>  /  AlkPhos  213<H>  02-14      Creatinine Trend: Creatinine Trend: 4.77<--, 6.33<--, 5.10<--, 6.52<--, 5.67<--, 4.49<--

## 2020-02-14 NOTE — PROGRESS NOTE ADULT - PROBLEM SELECTOR PROBLEM 3
DVT (deep venous thrombosis)
Essential hypertension
Fever
Acute saddle pulmonary embolism with acute cor pulmonale
Anemia
HTN (hypertension)
HTN (hypertension)
Infection
Infection
HTN (hypertension)

## 2020-02-14 NOTE — H&P ADULT - NSHPSOCIALHISTORY_GEN_ALL_CORE
SOCIAL HISTORY  Smoking - occasional cigarette use  EtOH - socially  Drugs - Denied    PRIOR FUNCTIONAL HISTORY  Ambulation: independent  ADLs: independent  pt was driving prior    Previous Home Equipment: denies    HOME ENVIRONMENT:  Type of Home: 1 floor private house  Stairs: 4 steps to enter in back of house / 8 steps to enter in front of house / no steps inside  Living With: family (mother and son)    Current Functional Status: 2/14  Bed mobility: mod assist x2  Transfers: mod assist x2  Gait: mod assist x 2

## 2020-02-15 LAB
ALBUMIN SERPL ELPH-MCNC: 1.9 G/DL — LOW (ref 3.3–5)
ALP SERPL-CCNC: 211 U/L — HIGH (ref 40–120)
ALT FLD-CCNC: 8 U/L — LOW (ref 10–45)
ANION GAP SERPL CALC-SCNC: 10 MMOL/L — SIGNIFICANT CHANGE UP (ref 5–17)
AST SERPL-CCNC: 37 U/L — SIGNIFICANT CHANGE UP (ref 10–40)
BASOPHILS # BLD AUTO: 0.1 K/UL — SIGNIFICANT CHANGE UP (ref 0–0.2)
BASOPHILS NFR BLD AUTO: 0.8 % — SIGNIFICANT CHANGE UP (ref 0–2)
BILIRUB SERPL-MCNC: 1.3 MG/DL — HIGH (ref 0.2–1.2)
BUN SERPL-MCNC: 27 MG/DL — HIGH (ref 7–23)
CALCIUM SERPL-MCNC: 8.8 MG/DL — SIGNIFICANT CHANGE UP (ref 8.4–10.5)
CHLORIDE SERPL-SCNC: 101 MMOL/L — SIGNIFICANT CHANGE UP (ref 96–108)
CO2 SERPL-SCNC: 28 MMOL/L — SIGNIFICANT CHANGE UP (ref 22–31)
CREAT SERPL-MCNC: 5.96 MG/DL — HIGH (ref 0.5–1.3)
CULTURE RESULTS: SIGNIFICANT CHANGE UP
EOSINOPHIL # BLD AUTO: 0.88 K/UL — HIGH (ref 0–0.5)
EOSINOPHIL NFR BLD AUTO: 7.3 % — HIGH (ref 0–6)
GLUCOSE SERPL-MCNC: 101 MG/DL — HIGH (ref 70–99)
HCT VFR BLD CALC: 27.1 % — LOW (ref 39–50)
HGB BLD-MCNC: 8.7 G/DL — LOW (ref 13–17)
IMM GRANULOCYTES NFR BLD AUTO: 1.3 % — SIGNIFICANT CHANGE UP (ref 0–1.5)
LYMPHOCYTES # BLD AUTO: 1.51 K/UL — SIGNIFICANT CHANGE UP (ref 1–3.3)
LYMPHOCYTES # BLD AUTO: 12.6 % — LOW (ref 13–44)
MCHC RBC-ENTMCNC: 30.2 PG — SIGNIFICANT CHANGE UP (ref 27–34)
MCHC RBC-ENTMCNC: 32.1 GM/DL — SIGNIFICANT CHANGE UP (ref 32–36)
MCV RBC AUTO: 94.1 FL — SIGNIFICANT CHANGE UP (ref 80–100)
MONOCYTES # BLD AUTO: 2.15 K/UL — HIGH (ref 0–0.9)
MONOCYTES NFR BLD AUTO: 17.9 % — HIGH (ref 2–14)
NEUTROPHILS # BLD AUTO: 7.2 K/UL — SIGNIFICANT CHANGE UP (ref 1.8–7.4)
NEUTROPHILS NFR BLD AUTO: 60.1 % — SIGNIFICANT CHANGE UP (ref 43–77)
NRBC # BLD: 0 /100 WBCS — SIGNIFICANT CHANGE UP (ref 0–0)
PLATELET # BLD AUTO: 619 K/UL — HIGH (ref 150–400)
POTASSIUM SERPL-MCNC: 4 MMOL/L — SIGNIFICANT CHANGE UP (ref 3.5–5.3)
POTASSIUM SERPL-SCNC: 4 MMOL/L — SIGNIFICANT CHANGE UP (ref 3.5–5.3)
PROT SERPL-MCNC: 6.9 G/DL — SIGNIFICANT CHANGE UP (ref 6–8.3)
RBC # BLD: 2.88 M/UL — LOW (ref 4.2–5.8)
RBC # FLD: 15.4 % — HIGH (ref 10.3–14.5)
SODIUM SERPL-SCNC: 139 MMOL/L — SIGNIFICANT CHANGE UP (ref 135–145)
SPECIMEN SOURCE: SIGNIFICANT CHANGE UP
WBC # BLD: 11.99 K/UL — HIGH (ref 3.8–10.5)
WBC # FLD AUTO: 11.99 K/UL — HIGH (ref 3.8–10.5)

## 2020-02-15 PROCEDURE — 93010 ELECTROCARDIOGRAM REPORT: CPT

## 2020-02-15 PROCEDURE — 99233 SBSQ HOSP IP/OBS HIGH 50: CPT

## 2020-02-15 PROCEDURE — 99223 1ST HOSP IP/OBS HIGH 75: CPT

## 2020-02-15 PROCEDURE — 99222 1ST HOSP IP/OBS MODERATE 55: CPT

## 2020-02-15 PROCEDURE — 99223 1ST HOSP IP/OBS HIGH 75: CPT | Mod: GC

## 2020-02-15 RX ORDER — PETROLATUM,WHITE
1 JELLY (GRAM) TOPICAL THREE TIMES A DAY
Refills: 0 | Status: DISCONTINUED | OUTPATIENT
Start: 2020-02-15 | End: 2020-03-03

## 2020-02-15 RX ORDER — ERYTHROPOIETIN 10000 [IU]/ML
10000 INJECTION, SOLUTION INTRAVENOUS; SUBCUTANEOUS
Refills: 0 | Status: DISCONTINUED | OUTPATIENT
Start: 2020-02-15 | End: 2020-03-03

## 2020-02-15 RX ORDER — MEROPENEM 1 G/30ML
500 INJECTION INTRAVENOUS EVERY 24 HOURS
Refills: 0 | Status: DISCONTINUED | OUTPATIENT
Start: 2020-02-16 | End: 2020-02-18

## 2020-02-15 RX ORDER — MEROPENEM 1 G/30ML
500 INJECTION INTRAVENOUS EVERY 24 HOURS
Refills: 0 | Status: DISCONTINUED | OUTPATIENT
Start: 2020-02-15 | End: 2020-02-15

## 2020-02-15 RX ORDER — ERYTHROPOIETIN 10000 [IU]/ML
10000 INJECTION, SOLUTION INTRAVENOUS; SUBCUTANEOUS
Refills: 0 | Status: DISCONTINUED | OUTPATIENT
Start: 2020-02-15 | End: 2020-02-15

## 2020-02-15 RX ORDER — LACTULOSE 10 G/15ML
10 SOLUTION ORAL DAILY
Refills: 0 | Status: DISCONTINUED | OUTPATIENT
Start: 2020-02-15 | End: 2020-03-03

## 2020-02-15 RX ADMIN — BRIMONIDINE TARTRATE 1 DROP(S): 2 SOLUTION/ DROPS OPHTHALMIC at 13:11

## 2020-02-15 RX ADMIN — BRIMONIDINE TARTRATE 1 DROP(S): 2 SOLUTION/ DROPS OPHTHALMIC at 05:58

## 2020-02-15 RX ADMIN — AMIODARONE HYDROCHLORIDE 200 MILLIGRAM(S): 400 TABLET ORAL at 05:58

## 2020-02-15 RX ADMIN — Medication 1 APPLICATION(S): at 21:37

## 2020-02-15 RX ADMIN — BRIMONIDINE TARTRATE 1 DROP(S): 2 SOLUTION/ DROPS OPHTHALMIC at 21:37

## 2020-02-15 RX ADMIN — OXYCODONE HYDROCHLORIDE 5 MILLIGRAM(S): 5 TABLET ORAL at 15:50

## 2020-02-15 RX ADMIN — Medication 500000 UNIT(S): at 11:37

## 2020-02-15 RX ADMIN — ERYTHROPOIETIN 10000 UNIT(S): 10000 INJECTION, SOLUTION INTRAVENOUS; SUBCUTANEOUS at 17:06

## 2020-02-15 RX ADMIN — APIXABAN 5 MILLIGRAM(S): 2.5 TABLET, FILM COATED ORAL at 20:14

## 2020-02-15 RX ADMIN — OXYCODONE HYDROCHLORIDE 5 MILLIGRAM(S): 5 TABLET ORAL at 10:07

## 2020-02-15 RX ADMIN — Medication 25 MILLIGRAM(S): at 20:14

## 2020-02-15 RX ADMIN — OXYCODONE HYDROCHLORIDE 5 MILLIGRAM(S): 5 TABLET ORAL at 11:05

## 2020-02-15 RX ADMIN — Medication 250 MILLIGRAM(S): at 06:04

## 2020-02-15 RX ADMIN — Medication 10 MILLIGRAM(S): at 21:38

## 2020-02-15 RX ADMIN — LATANOPROST 1 DROP(S): 0.05 SOLUTION/ DROPS OPHTHALMIC; TOPICAL at 11:36

## 2020-02-15 RX ADMIN — SENNA PLUS 2 TABLET(S): 8.6 TABLET ORAL at 21:38

## 2020-02-15 RX ADMIN — Medication 250 MILLIGRAM(S): at 20:14

## 2020-02-15 RX ADMIN — Medication 500 MILLIGRAM(S): at 08:30

## 2020-02-15 RX ADMIN — Medication 100 MILLIGRAM(S): at 11:37

## 2020-02-15 RX ADMIN — FAMOTIDINE 10 MILLIGRAM(S): 10 INJECTION INTRAVENOUS at 11:36

## 2020-02-15 RX ADMIN — Medication 500000 UNIT(S): at 20:14

## 2020-02-15 RX ADMIN — Medication 500000 UNIT(S): at 05:58

## 2020-02-15 RX ADMIN — GABAPENTIN 100 MILLIGRAM(S): 400 CAPSULE ORAL at 05:58

## 2020-02-15 RX ADMIN — APIXABAN 5 MILLIGRAM(S): 2.5 TABLET, FILM COATED ORAL at 05:58

## 2020-02-15 RX ADMIN — Medication 1 TABLET(S): at 11:37

## 2020-02-15 RX ADMIN — OXYCODONE HYDROCHLORIDE 5 MILLIGRAM(S): 5 TABLET ORAL at 14:54

## 2020-02-15 NOTE — CONSULT NOTE ADULT - SUBJECTIVE AND OBJECTIVE BOX
Mr Prado is a 49 year-old male who presents with chief complaint of Debility following an extensive hospital course which included cardiac arrest        HPI:  Mr Prado is a 49 year-old male with hx of ESRD on HD (via LUE AVF s/p transplant), HTN, Gout, Glaucoma, NET of pancreas (s/p robotic distal panc/splenectomy at [facility] 2015 by Dr. Young, followed by Dr. Raphael, [facility] Oncologist), RA with hand contractures who presented to [facility] on 12/27/19 from dialysis for a fever. Of note, he was recently admitted and underwent DDRT on 12/8/19 (ureteral stent sutured to Kennedy - removed 12/15). His post op course at that time was complicated by DGF requiring HD and MAHA requiring PLEX and belacept.    During his most recent admission he was found to be influenza positive in addition to a paranephritic hematoma/abscess. At that time he was initiated on tamiflu and meropenem and taken to the OR on 1/1 for evacuation of perinephretic hematoma which was infected with enterobacter and biopsy of transplanted kidney. Biopsy revealed ATN. Pt initially improved but began to have significant hematuria with clots for which urology was consulted. Pt placed on CBI and hematuria resolved. Pt began have increasing WBC and fever curve in addition to hypotension. He was subsequently upgraded to the SICU. On 1/8 the patient was noted to have swelling in his lower extremities and found to have right-sided DVT in his external iliac and right common femoral veins for which heparin gtt was initiated. Due to continued hematuria the patient was taken for IVC filter and angiogram. The angiogram showed actively bleeding pseudoaneurysm at the anastomosis of the transplant renal artery and recipient iliac artery. IR subsequently placed an iliac artery stent and was taken directly to the OR. There Transplant surgery evacuated the hematoma surrounding the anastomosis and Vascular surgery repaired the right external iliac artery with a bovine pericardial patch. Additionally there was thrombectomy of the lower extremity DVTs at that time. Following this transplant surgery removed the the transplanted kidney and bladder was repaired as well. His course was further complicated by a cardiac arrest on 1/13 which was PEA/Torsades. At that time POCUS was suggestive of pulmonary embolism and the patient was sent for CTA which showed saddle PE. This was managed medically with anticoagulation (heparin -> Eliquis). Due to persistent fevers additionally imaging was performed and found a pelvic collection of fluid. IR subsequently placed a pigtail drain on 1/15 which was repositioned on 1/22. Additionally his hospital course included an infiltration of his AVF on 2/1 during HD with arterial extravasation/bleeding to the left upper arm and chest. This was corrected on 2/3 by IR. During his course he also developed AF and was started on Amiodarone (also due to Torsades arrest).     During his admission he was seen by PMR and felt to be of benefit to acute rehabilitation.     Currently the patient states he has not had any issues over the past 24 hours. He does note some pain in his groin where his wound vac had been. He also notes some right sided pain.              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  Carpal tunnel syndrome  Brain cyst: had MRI recently- now gone  Anemia  Gastric ulcer: Long time ago  Rheumatoid arthritis  Renal transplant recipient  Breakdown (mechanical) of penile (implanted) prosthesis, sequela  End-stage renal disease (ESRD): PERMACATH PLACEMENT  Abscess of left buttock: s/p I &amp; D  AV fistula: placement left lower arm  S/P cystoscopy: stent placement &amp; removal for kidney stones, lithiotripsy  s/p Lt Carpal tunnel: 2011      FMHX: notable for HTN          Substance Use (street drugs): denies  Tobacco Usage:  denies  Alcohol Usage: denies  Sexual History: denies  Recent Travel: denies       Allergies    coconut (Anaphylaxis)  morphine (Pruritus; Rash)    Intolerances        ciprofloxacin     Tablet 500 milliGRAM(s) Oral every 24 hours  epoetin trey Injectable 60595 Unit(s) IV Push <User Schedule>  metoprolol tartrate 25 milliGRAM(s) Oral every 12 hours  saccharomyces boulardii 250 milliGRAM(s) Oral two times a day      REVIEW OF SYSTEMS:  CONSTITUTIONAL: No fever, weight loss, or fatigue  EYES: No eye pain, visual disturbances, or discharge  ENMT:  No difficulty hearing, tinnitus, vertigo; No sinus or throat pain  NECK: No pain or stiffness  RESPIRATORY: No cough, wheezing, chills or hemoptysis; No shortness of breath  CARDIOVASCULAR: No chest pain, palpitations, dizziness, or leg swelling  GASTROINTESTINAL: No abdominal or epigastric pain. No nausea, vomiting, or hematemesis; No diarrhea or constipation. No melena or hematochezia.  GENITOURINARY: No dysuria, frequency, hematuria, or incontinence  NEUROLOGICAL: No headaches, memory loss, loss of strength, numbness, or tremors  SKIN: No itching, burning, rashes, or lesions   LYMPH NODES: No enlarged glands  ENDOCRINE: No heat or cold intolerance; No hair loss  MUSCULOSKELETAL: No joint pain or swelling; No muscle, back, or extremity pain, + groin and right flank pain  PSYCHIATRIC: No depression, anxiety, mood swings, or difficulty sleeping  HEME/LYMPH: No easy bruising, or bleeding gums  ALLERY AND IMMUNOLOGIC: No hives or eczema    ALL ROS REVIEWED AND NORMAL EXCEPT AS STATED ABOVE    T(C): 36.7 (02-15-20 @ 09:21), Max: 37 (02-15-20 @ 06:00)  HR: 61 (02-15-20 @ 09:21) (59 - 83)  BP: 132/73 (02-15-20 @ 09:21) (107/45 - 147/74)  RR: 14 (02-15-20 @ 09:21) (14 - 18)  SpO2: 98% (02-15-20 @ 09:21) (95% - 100%)  Wt(kg): --Vital Signs Last 24 Hrs  T(C): 36.7 (15 Feb 2020 09:21), Max: 37 (15 Feb 2020 06:00)  T(F): 98.1 (15 Feb 2020 09:21), Max: 98.6 (15 Feb 2020 06:00)  HR: 61 (15 Feb 2020 09:21) (59 - 83)  BP: 132/73 (15 Feb 2020 09:21) (107/45 - 147/74)  BP(mean): --  RR: 14 (15 Feb 2020 09:21) (14 - 18)  SpO2: 98% (15 Feb 2020 09:21) (95% - 100%)    PHYSICAL EXAM:  GENERAL: NAD, well-groomed, well-developed  HEAD:  Atraumatic, Normocephalic  EYES: EOMI, PERRLA, conjunctiva and sclera clear  ENMT: No tonsillar erythema, exudates, or enlargement; Moist mucous membranes, Good dentition, No lesions  NECK: Supple, No JVD, Normal thyroid  NERVOUS SYSTEM:  Alert & Oriented X3, Good concentration; Motor Strength decreased throughout   CHEST/LUNG: Clear to percussion bilaterally; No rales, rhonchi, wheezing, or rubs  HEART: Regular rate and rhythm; No murmurs, rubs, or gallops  ABDOMEN: Soft, Nontender, Nondistended; Bowel sounds present  EXTREMITIES:  2+ Peripheral Pulses, No clubbing, cyanosis, + edema in LUE and B/L LE  LYMPH: No lymphadenopathy noted  SKIN: ecchymosis noted in extremities    LABS:                        8.7    11.99 )-----------( 619      ( 15 Feb 2020 06:00 )             27.1     02-15    139  |  101  |  27<H>  ----------------------------<  101<H>  4.0   |  28  |  5.96<H>    Ca    8.8      15 Feb 2020 06:00  Phos  3.7     02-14  Mg     2.1     02-14    TPro  6.9  /  Alb  1.9<L>  /  TBili  1.3<H>  /  DBili  x   /  AST  37  /  ALT  8<L>  /  AlkPhos  211<H>  02-15    PT/INR - ( 14 Feb 2020 07:10 )   PT: 18.0 sec;   INR: 1.56 ratio         PTT - ( 14 Feb 2020 07:10 )  PTT:39.6 sec     CAPILLARY BLOOD GLUCOSE                RADIOLOGY & ADDITIONAL TESTS:    Consultant(s) Notes Reviewed:  [x ] YES  [ ] NO  Care Discussed with Consultants/Other Providers [ x] YES  [ ] NO  Imaging Personally Reviewed:  [ ] YES  [ ] NO

## 2020-02-15 NOTE — CONSULT NOTE ADULT - ASSESSMENT
Hx ESRD on HD  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, DGF  S/p transplant kidney bx 12/13: ATN, focal TMA  S/p PLEX 12/13, 12/15; d/c-d 12/20 w/op HD 3 x wk  Re-adm 12/27 w/Flu A, infected carlos-nephric hematoma   S/p OR 1/1 for washout and repeat renal graft biopsy (ATN)  Dx w/RLE DVT  Developed gross hematuria  S/p OR 1/10 for infected carlos-nephric hematoma, hemorrhage involving pseudo-aneurysm of anastomosis of renal artery to external iliac vein,   s/p transplant nephrectomy, s/p IVCF   S/p PEA arrest 1/13, + PE, R retroperitoneal collection  S/p IR drainage of abd collection  RUE DVT, AC  Abx per ID  AVF infiltration 2/1 w/hematoma extending to L chest wall while on Eliquis, s/p PRBCs.   S/p perm cath. Dietary restriction.   HD TTS. Epogen for anemia. Further recommendations pending clinical course. Thank you for the courtesy of this referral.

## 2020-02-15 NOTE — CONSULT NOTE ADULT - ASSESSMENT
Mr Prado is a pleasant 49 year-old male with hx of ESRD on HD ( via permacath), HTN, Gout, Glaucoma, NET of pancreas (s/p robotic distal panc/splenectomy), RA with hand contractures, DDRT 12/8/18 c/b DGF requiring who presented to [facility] on 12/27/19 from dialysis for a fever found to be flu positive treated with tamiflu and have enterobacter infected perinephritic hematoma whose course was complicated by DVT and then RP hematoma and bladder perforation s/p repair and evacuation. His course was further complicated by being cardiac arrest in setting of saddle PE. 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 Additionally he was noted to have an acute DVT of the RUE brachial vein as well. He is currently admitted to [facility] for acute rehabilitation of his functional deficits.      Pulmonary  DVT/PE  - eliquis 5mg bid  - monitor BP closely   - s/p IVC filter  - ensure that patient is not straining with his BM given saddle embolus (see below)    Cardiovascular  Cardiac Arrest  - PT/OT per primary team  - given torsades arrest would avoid QTc prolonging agents  Atrial Fibrillation  - amiodarone 200mg qd  - metoprolol 25mg bid with hold parameters  - eliquis 5mg bid  Hypertension  - metoprolol (hold on dialysis days)    Renal/ID  End Stage Renal Disease on HD  - consult nephrology for HD  - monitor electrolytes closely  - renal diet (low K)  Perinephritic Abscess   - c/w cipro until 2/20/2020  - obtain ECG daily to monitor QTc while on amiodarone and cipro  Hyponatremia  - appears to have resolved  - continue to monitor    Rheumatology  RA  - would consider rheumatology consultation in regards to necessary medications, patient was previously being treated with immunosuppresants with transplant but is now off them. Given that these medications did have some disease modifying activity, he is at risk of RA flare given they are no longer being administered.   Gout  - allopurinol    Vascular  LUE Hematoma  - continue to monitor closely given anticoagulation usage  - surgery recommendations appreciated    Hematology  Leukocytosis  - likely in setting of resolving infection  - continue to monitor  Thrombocytosis  - likely reactive  - Hematology follow up as an outpatient  Normocytic anemia  - likely multifactorial given surgeries   - also partially driven by renal disease  - continue epogen    Gastroenterology  Constipation  - senna, would change to bid  - would add miralax bid as well     Optho  Glaucoma:  -Continue with drops    DVT prophylaxis: eliquis  Diet: renal  Pain Control: per primary - avoid QTc prolonging agents if possible  Dispo: per primary  Case discussed with PMR resident and attending  If a clinical question should arise regarding this patient please do not hesitate to contact me at [phone] until 7pm on 2/15/2020    Given extensive and complicated Hospital course >120 minutes of time was spent of which greater than 50% were spent reviewing chart and coordinating the care of this patient

## 2020-02-15 NOTE — CONSULT NOTE ADULT - SUBJECTIVE AND OBJECTIVE BOX
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 Young, followed by Dr. Raphael, [facility] Oncologist), RA with hand contractures who presented to [facility] on 12/27/19 from dialysis for a fever. 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 His post op course c/b DGF requiring HD, thrombocytopenia, elevated LDH and Haptoglobulin. Schistocytes  on peripheral smear concerning for TMA. Envarsus held. Received Belatacept 12/13. Started on PLEX (12/12, 12/15). Underwent renal bx on 12/13 c/w profound ATN.  Found to have much improvement to levels, likely related to Tacrolimus. He was discharged home on 12/20/19 with outpatient HD.     Patient was now sent to [facility] ED on 12/17/19 by dialysis center after he was found to be febrile to 102, he did not get HD this AM, and last full HD session was on 12/26. Upon arrival, pt was found to have the flu and positive urine culture with Enterobacter. ID was consulted and pt was started on Tamiflu and Meropenam. Transplant team and Nephrology was following patient for HD. Pt was taken to the OR on 1/1 for evacuation of perinephretic hematoma which was infected with enterobacter and biopsy of transplanted kidney. Biopsy revealed ATN. Pt was recommended to continue on Meropenam as per ID. Pt also had a CT guided pelvic fluid aspiration by IR on 1/6. Post procedure pt had significant hematuria with clots for which urology was consulted. Pt placed on CBI and hematuria resolved. Pt had multiple antibiotics and antifungals added as per ID recommendations to due to rising WBC. Pt was taken back to the OR on 1/10 for evacuation of RP hematoma removal of external iliac artery stent, bovine pericardial patch repair of ext iliac artery, and ligation of external iliac vein. Pt was emergently taken to the OR after IR for explantation of transplanted kidney and repair of bladder perforation by transplant surgery. Vascular surgery called intra-op and repaired the right external iliac artery was repaired with a bovine pericardial patch, repaired the right external iliac vein, performed a thrombectomy of the right lower extremity veins and had an IVC filter placed. He had a renal US and IR guided drain and pigtail placed on 1/15. He had CT chest/abdomen/pelvis done on 1/21, which demonstrated persistent collection and so the IR pigtail drain was repositioned on 1/22.     His course was notable on 1/8 pt was found to have RLE DVT involving ext iliac, common fem veins, s/p thromobectomy and IVC filter placement, currently on treatment. Hospital course was further complicated by cardiac arrest on 1/13 due to massive acute saddle pulmonary embolism with acute cor pulmonale and acute DVT of the brachial vein in RUE. Pt was started on Eliquis. His course was complicated on 2/1 with AVF infiltrated during HD with aterial extravasation/bleeding to the left upper arm and chest seen on CTa. On 2/3, he had an arteriography, which demonstrated a tiny pseudoaneurysm arising from a distal branch of the left deep brachial artery and so permacath was placed on 2/11. He was also noted to have newly diagnosed afib and cardiology was following for medication management. He was also noted to have thrombocytosis and hematalogy-oncology work up was done with justin for outpatient follow up. He was also noted to have hyponatremia, which remained stable.    Patient medically stabilized and transferred to [facility] 2/14/20 for IRF services. (14 Feb 2020 12:48)    Renal consult called for ESRD on hemodialysis. History obtained from chart and patient.       PAST MEDICAL HISTORY:  Erectile dysfunction  Glaucoma  Gout  HTN (hypertension)  ESRD (end stage renal disease) on dialysis  Contracture of finger joint  Carpal tunnel syndrome  Brain cyst  Anemia  Renal insufficiency  Gastric ulcer  Rheumatoid arthritis      PAST SURGICAL HISTORY:  Renal transplant recipient  Breakdown (mechanical) of penile (implanted) prosthesis, sequela  End-stage renal disease (ESRD)  Abscess of left buttock  AV fistula  S/P cystoscopy  s/p Lt Carpal tunnel  Rheumatoid arthritis      FAMILY HISTORY:  Family history of myocardial infarction in first degree male relative  Family history of rheumatoid arthritis  Family history of hypertension in mother  Family history of cerebral aneurysm (Sibling)      SOCIAL HISTORY: No smoking or alcohol use     Allergies    coconut (Anaphylaxis)  morphine (Pruritus; Rash)    Intolerances      Home Medications:  amiodarone 200 mg oral tablet: 1 tab(s) orally once a day (14 Feb 2020 11:48)  apixaban 5 mg oral tablet: 1 tab(s) orally 2 times a day (14 Feb 2020 11:48)  ciprofloxacin 500 mg oral tablet: 1 tab(s) orally once a day (14 Feb 2020 14:55)  oxyCODONE 5 mg oral tablet: 1 tab(s) orally every 4 hours, As needed, Moderate Pain (4 - 6) (14 Feb 2020 11:48)  polyethylene glycol 3350 oral powder for reconstitution: 17 gram(s) orally once a day (14 Feb 2020 11:48)  senna oral tablet: 2 tab(s) orally once a day (at bedtime) (14 Feb 2020 11:48)    MEDICATIONS  (STANDING):  allopurinol 100 milliGRAM(s) Oral daily  apixaban 5 milliGRAM(s) Oral two times a day  brimonidine 0.2% Ophthalmic Solution 1 Drop(s) Both EYES three times a day  epoetin trey Injectable 77831 Unit(s) IV Push <User Schedule>  famotidine    Tablet 10 milliGRAM(s) Oral daily  latanoprost 0.005% Ophthalmic Solution 1 Drop(s) Both EYES daily  metoprolol tartrate 25 milliGRAM(s) Oral every 12 hours  Nephro-candace 1 Tablet(s) Oral daily  nystatin    Suspension 868297 Unit(s) Oral four times a day  polyethylene glycol 3350 17 Gram(s) Oral daily  saccharomyces boulardii 250 milliGRAM(s) Oral two times a day  senna 2 Tablet(s) Oral at bedtime    MEDICATIONS  (PRN):  acetaminophen   Tablet .. 975 milliGRAM(s) Oral every 6 hours PRN Mild Pain (1 - 3)  benzocaine 15 mG/menthol 3.6 mG (Sugar-Free) Lozenge 1 Lozenge Oral three times a day PRN Sore Throat  lactulose Syrup 10 Gram(s) Oral daily PRN constipation  oxyCODONE    IR 5 milliGRAM(s) Oral every 4 hours PRN Moderate Pain (4 - 6)  oxyCODONE    IR 10 milliGRAM(s) Oral every 4 hours PRN Severe Pain (7 - 10)      REVIEW OF SYSTEMS:  General: no distress  Respiratory: No cough, SOB  Cardiovascular: No CP or Palpitations	  Gastrointestinal: No nausea, Vomiting. No diarrhea  Genitourinary: No urinary complaints	  Musculoskeletal: No new rash or lesions	  all other systems negative    T(F): 98.1 (02-15-20 @ 09:21), Max: 98.6 (02-15-20 @ 06:00)  HR: 61 (02-15-20 @ 09:21) (59 - 83)  BP: 132/73 (02-15-20 @ 09:21) (107/45 - 137/72)  RR: 14 (02-15-20 @ 09:21) (14 - 16)  SpO2: 98% (02-15-20 @ 09:21) (95% - 100%)  Wt(kg): --    PHYSICAL EXAM:  General: NAD, dialysis catheter  Respiratory: b/l air entry  Cardiovascular: S1 S2  Gastrointestinal: soft  Extremities: + edema b/l,         02-15    139  |  101  |  27<H>  ----------------------------<  101<H>  4.0   |  28  |  5.96<H>    Ca    8.8      15 Feb 2020 06:00  Phos  3.7     02-14  Mg     2.1     02-14    TPro  6.9  /  Alb  1.9<L>  /  TBili  1.3<H>  /  DBili  x   /  AST  37  /  ALT  8<L>  /  AlkPhos  211<H>  02-15                          8.7    11.99 )-----------( 619      ( 15 Feb 2020 06:00 )             27.1       Potassium, Serum: 4.0 mmol/L (02-15 @ 06:00)  Blood Urea Nitrogen, Serum: 27 mg/dL (02-15 @ 06:00)  Calcium, Total Serum: 8.8 mg/dL (02-15 @ 06:00)  Hemoglobin: 8.7 g/dL (02-15 @ 06:00)      Creatinine, Serum: 5.96 (02-15 @ 06:00)  Creatinine, Serum: 4.77 (02-14 @ 07:10)  Creatinine, Serum: 6.33 (02-13 @ 09:52)        LIVER FUNCTIONS - ( 15 Feb 2020 06:00 )  Alb: 1.9 g/dL / Pro: 6.9 g/dL / ALK PHOS: 211 U/L / ALT: 8 U/L / AST: 37 U/L / GGT: x                       I&O's Detail        Culture - Group A Streptococcus (collected 12 Feb 2020 23:18)  Source: .Throat  Final Report (14 Feb 2020 13:11):    No Streptococcus pyogenes (Group A) isolated

## 2020-02-15 NOTE — CONSULT NOTE ADULT - SUBJECTIVE AND OBJECTIVE BOX
CHIEF COMPLAINT:  Patient is a 49y old  Male who presents with a chief complaint of Debility (15 Feb 2020 11:08)    HPI:  49M with PMHx of ESRD on HD ( via LUE AVF), HTN, Gout, Glaucoma, NET of pancreas (s/p robotic distal panc/splenectomy at [place] 2015 by Dr. Young, followed by Dr. Raphael, [facility] Oncologist), RA with hand contractures who presented to [facility] on 12/27/19 from dialysis for a fever. 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 His post op course c/b DGF requiring HD, thrombocytopenia, elevated LDH and Haptoglobulin. Schistocytes  on peripheral smear concerning for TMA. Envarsus held. Received Belatacept 12/13. Started on PLEX (12/12, 12/15). Underwent renal bx on 12/13 c/w profound ATN.  Found to have much improvement to levels, likely related to Tacrolimus. He was discharged home on 12/20/19 with outpatient HD.     Patient was now sent to [facility] ED on 12/17/19 by dialysis center after he was found to be febrile to 102, he did not get HD this AM, and last full HD session was on 12/26. Upon arrival, pt was found to have the flu and positive urine culture with Enterobacter. ID was consulted and pt was started on Tamiflu and Meropenam. Transplant team and Nephrology was following patient for HD. Pt was taken to the OR on 1/1 for evacuation of perinephretic hematoma which was infected with enterobacter and biopsy of transplanted kidney. Biopsy revealed ATN. Pt was recommended to continue on Meropenam as per ID. Pt also had a CT guided pelvic fluid aspiration by IR on 1/6. Post procedure pt had significant hematuria with clots for which urology was consulted. Pt placed on CBI and hematuria resolved. Pt had multiple antibiotics and antifungals added as per ID recommendations to due to rising WBC. Pt was taken back to the OR on 1/10 for evacuation of RP hematoma removal of external iliac artery stent, bovine pericardial patch repair of ext iliac artery, and ligation of external iliac vein. Pt was emergently taken to the OR after IR for explantation of transplanted kidney and repair of bladder perforation by transplant surgery. Vascular surgery called intra-op and repaired the right external iliac artery was repaired with a bovine pericardial patch, repaired the right external iliac vein, performed a thrombectomy of the right lower extremity veins and had an IVC filter placed. He had a renal US and IR guided drain and pigtail placed on 1/15. He had CT chest/abdomen/pelvis done on 1/21, which demonstrated persistent collection and so the IR pigtail drain was repositioned on 1/22.       His course was notable on 1/8 pt was found to have RLE DVT involving ext iliac, common fem veins, s/p thromobectomy and IVC filter placement, currently on treatment. Hospital course was further complicated by cardiac arrest on 1/13 due to massive acute saddle pulmonary embolism with acute cor pulmonale and acute DVT of the brachial vein in RUE. Pt was started on Eliquis. His course was complicated on 2/1 with AVF infiltrated during HD with aterial extravasation/bleeding to the left upper arm and chest seen on CTa. On 2/3, he had an arteriography, which demonstrated a tiny pseudoaneurysm arising from a distal branch of the left deep brachial artery and so permacath was placed on 2/11. He was also noted to have newly diagnosed afib and cardiology was following for medication management. He was also noted to have thrombocytosis and hematalogy-oncology work up was done with justin for outpatient follow up. He was also noted to have hyponatremia, which remained stable. Patient medically stabilized and admitted here for rehab. (14 Feb 2020 12:48)    Patient seen on 1[facility] rehab regarding QT interval. s/p Cardiac arrest/asaddle embolism, on amiodarone, also gabapentin, cipro.  On oral anticoagulation.  Has mild pires, not chest pain, palpitations.    PMH:   Erectile dysfunction  Glaucoma  Gout  HTN (hypertension)  ESRD (end stage renal disease) on dialysis  Contracture of finger joint  Carpal tunnel syndrome  Brain cyst  Anemia  Renal insufficiency  Gastric ulcer  Rheumatoid arthritis      PSH:   Renal transplant recipient  Breakdown (mechanical) of penile (implanted) prosthesis, sequela  End-stage renal disease (ESRD)  Abscess of left buttock  AV fistula  S/P cystoscopy  s/p Lt Carpal tunnel  Rheumatoid arthritis    FAMILY HISTORY:  Family history of myocardial infarction in first degree male relative  Family history of rheumatoid arthritis  Family history of hypertension in mother  Family history of cerebral aneurysm (Sibling)      SOCIAL HISTORY:  Smoking:  no  Alcohol:  no    ALLERGIES:  coconut (Anaphylaxis)  morphine (Pruritus; Rash)      Home Medications:  amiodarone 200 mg oral tablet: 1 tab(s) orally once a day (14 Feb 2020 11:48)  apixaban 5 mg oral tablet: 1 tab(s) orally 2 times a day (14 Feb 2020 11:48)  ciprofloxacin 500 mg oral tablet: 1 tab(s) orally once a day (14 Feb 2020 14:55)  oxyCODONE 5 mg oral tablet: 1 tab(s) orally every 4 hours, As needed, Moderate Pain (4 - 6) (14 Feb 2020 11:48)  polyethylene glycol 3350 oral powder for reconstitution: 17 gram(s) orally once a day (14 Feb 2020 11:48)  senna oral tablet: 2 tab(s) orally once a day (at bedtime) (14 Feb 2020 11:48)      MEDICATIONS:  acetaminophen   Tablet .. 975 milliGRAM(s) Oral every 6 hours PRN  allopurinol 100 milliGRAM(s) Oral daily  aMIOdarone    Tablet 200 milliGRAM(s) Oral daily  apixaban 5 milliGRAM(s) Oral two times a day  benzocaine 15 mG/menthol 3.6 mG (Sugar-Free) Lozenge 1 Lozenge Oral three times a day PRN  brimonidine 0.2% Ophthalmic Solution 1 Drop(s) Both EYES three times a day  epoetin trey Injectable 45688 Unit(s) IV Push <User Schedule>  famotidine    Tablet 10 milliGRAM(s) Oral daily  gabapentin 100 milliGRAM(s) Oral every 12 hours  latanoprost 0.005% Ophthalmic Solution 1 Drop(s) Both EYES daily  metoprolol tartrate 25 milliGRAM(s) Oral every 12 hours  Nephro-candace 1 Tablet(s) Oral daily  nystatin    Suspension 423839 Unit(s) Oral four times a day  oxyCODONE    IR 5 milliGRAM(s) Oral every 4 hours PRN  oxyCODONE    IR 10 milliGRAM(s) Oral every 4 hours PRN  polyethylene glycol 3350 17 Gram(s) Oral daily  saccharomyces boulardii 250 milliGRAM(s) Oral two times a day  senna 2 Tablet(s) Oral at bedtime      REVIEW OF SYSTEMS:  CONSTITUTIONAL: No fever, weight loss, or fatigue  EYES: No eye pain, visual disturbances, or discharge  ENMT:  No difficulty hearing, tinnitus, vertigo; No sinus or throat pain  NECK: No pain or stiffness  BREASTS: No pain, masses, or nipple discharge  RESPIRATORY: No cough, wheezing, chills or hemoptysis;   CARDIOVASCULAR: No chest pain, palpitations, dizziness.  GASTROINTESTINAL: No abdominal or epigastric pain. No nausea, vomiting, or hematemesis; No diarrhea or constipation. No melena or hematochezia.  GENITOURINARY: Small urine output  NEUROLOGICAL: No headaches, memory loss, loss of strength, numbness, or tremors  SKIN: No itching, burning, rashes, or lesions   LYMPH NODES: No enlarged glands  ENDOCRINE: No heat or cold intolerance; No hair loss  MUSCULOSKELETAL: Has wounds, lower extremity swelling  PSYCHIATRIC: No depression, anxiety, mood swings, or difficulty sleeping  HEME/LYMPH: No easy bruising, or bleeding gums  ALLERGY AND IMMUNOLOGIC: No hives or eczema    PHYSICAL EXAM:  T(C): 36.7 (02-15-20 @ 09:21), Max: 37 (02-15-20 @ 06:00)  HR: 61 (02-15-20 @ 09:21) (59 - 83)  BP: 132/73 (02-15-20 @ 09:21) (107/45 - 147/74)  RR: 14 (02-15-20 @ 09:21) (14 - 18)  SpO2: 98% (02-15-20 @ 09:21) (95% - 100%)  Wt(kg): --    GENERAL: NAD, well-groomed, well-developed  HEAD:  Atraumatic, Normocephalic  EYES: EOMI, conjunctiva and sclera clear  ENT: Moist mucous membranes,  NECK: Supple, No JVD, no bruits  CHEST/LUNG: Clear to ausculation and percussion bilaterally; No rales, rhonchi, wheezing, or rubs  HEART: Regular rate and rhythm; No murmurs, rubs, or gallops PMI non displaced.  ABDOMEN: Soft, Nontender, Nondistended; Bowel sounds present  EXTREMITIES: + edema, bnadaged LE  SKIN: No rashes or lesions  NERVOUS SYSTEM:  Alert     Cardiovascular Diagnostic Testing:  ECG:    sinus , low voltage, t changes and qt prolongation    LABS:                        8.7    11.99 )-----------( 619      ( 15 Feb 2020 06:00 )             27.1     02-15    139  |  101  |  27<H>  ----------------------------<  101<H>  4.0   |  28  |  5.96<H>    Ca    8.8      15 Feb 2020 06:00  Phos  3.7     02-14  Mg     2.1     02-14    TPro  6.9  /  Alb  1.9<L>  /  TBili  1.3<H>  /  DBili  x   /  AST  37  /  ALT  8<L>  /  AlkPhos  211<H>  02-15    PT/INR - ( 14 Feb 2020 07:10 )   PT: 18.0 sec;   INR: 1.56 ratio         PTT - ( 14 Feb 2020 07:10 )  PTT:39.6 sec          IMAGING:    < from: CT Angio Chest w/ IV Cont (02.01.20 @ 22:32) >  EXAM:  CT ANGIO UPR EXT (W)AW IC LT                          EXAM:  CT ANGIO CHEST (W)AW IC                            PROCEDURE DATE:  02/01/2020            INTERPRETATION:  CLINICAL INFORMATION: Status post IV infiltration during dialysis. Concern for AVF extravasation.    COMPARISON: CT chest 1/20/2020 and upper extremity ultrasound 2/1/2020.    PROCEDURE:   CT Angiography of the Chest and left upper extremity.  100 ml of Omnipaque 350 was injected intravenously.   Sagittal and coronal reformats were performed as well as 3D (MIP) reconstructions.      FINDINGS:    LUNGS AND AIRWAYS: Patent central airways.  Bibasilar atelectasis.    PLEURA: Small bilateral pleural effusion.    MEDIASTINUM AND EDUIN: No lymphadenopathy.    VESSELS: Redemonstrated bilateral subsegmental lower lobe pulmonary emboli. Atherosclerotic calcifications of the aorta and coronary arteries.    HEART: Heart size is normal. No pericardial effusion.    CHEST WALL AND LOWER NECK: Left thyroid lobe calcification.    VISUALIZED UPPER ABDOMEN: Anasarca. IVC filter. Cholelithiasis. Status post distal pancreatectomy.    BONES: Left hip ORIF. Unchanged sclerotic focus in the right iliac bone. Healed posterior left rib fractures. Right lower quadrant fluid collection atthe site of transplant nephrectomy with indwelling drainage catheter, now measuring 5.2 x 1.4 cm, decreased from prior, previously measuring 5.5 x 2.4 cm.    LEFT UPPER EXTREMITY:     Patient status post radial-cephalic AV fistula.    Large left upper extremity hematoma measuring 17.3 x 5.0 cm which extends into the left axilla with active arterial extravasation which persists on more delayed imaging (10, 250). Significant adjacent soft tissue infiltration.    Additional hematoma in the left anterior chest wall measuring 8 x 4.7 cm.    The outflow vein is patent however has aneurysmal dilatation measuring up to 1.2 cm.      IMPRESSION:     Large left upper extremity hematoma measuring 17.3 x 5.0 cm which extends into the left axilla with active arterial extravasation which persists on more delayed imaging.    These findings were discussed with Dr. May at 2/1/2020 10:58 PM by Dr. Hernandez with read back.      LINDA MERRILL M.D., RADIOLOGY RESIDENT  This document has been electronically signed.  PORTILLO HARMON M.D., ATTENDING RADIOLOGIST  This document has been electronically signed. Feb 2 2020  2:09PM        < end of copied text >    < from: Transthoracic Echocardiogram (01.15.20 @ 08:54) >    Patient name: KAYLA PA  YOB: 1970   Age: 49 (M)   MR#: [id number]  Study Date: 1/15/2020  Location: [address] [place], [place] [id number]: Leticia Meeks RDCS  Study quality: Limited  Referring Physician: Rebecca Cruz MD  Blood Pressure: 142/65 mmHg  Height: 168 cm  Weight: 74 kg  BSA: 1.8 m2  ------------------------------------------------------------------------  PROCEDURE: Transthoracic echocardiogram with 2-D, M-Mode  and complete spectral and color flow Doppler.  INDICATION: Other cardiomyopathies (I42.8)  ------------------------------------------------------------------------  Dimensions:    Normal Values:  LA:            2.0 - 4.0 cm  Ao:            2.0 - 3.8 cm  SEPTUM: 1.0    0.6 - 1.2 cm  PWT:    0.9    0.6 - 1.1 cm  LVIDd:  4.4    3.0 - 5.6 cm  LVIDs:  3.0    1.8 - 4.0 cm  Derived variables:  LVMI: 75 g/m2  RWT: 0.40  ------------------------------------------------------------------------  Observations:  Left Atrium: Left atrium not well visualized, probably  normal.  Left Ventricle: Endocardium not well visualized; grossly  normal left ventricular systolic function. Paradoxical  septal motion consistent with right ventricular overload.  Normal left ventricular internal dimensions and wall  thicknesses.  Right Heart: Normal right atrium. Mild right ventricular  enlargement with overall midlly decreased right ventricular  systolic function. TAPSE=1.3 cm. Right ventricular systolic  dysfunction with preserved apical wall motion is seen  suggestive of Crystal's sign. The RV apex is  hyperkinetic. Normal tricuspid valve. Mild-moderate  tricuspid regurgitation. Normal pulmonic valve.  Pericardium/Pleura: Normal pericardium with no pericardial  effusion.  Hemodynamic: Estimated right atrial pressure is 8 mm Hg.  Estimated right ventricular systolic pressure equals 45 mm  Hg, assuming right atrial pressure equals 8 mm Hg,  consistent with mild pulmonary hypertension.  ------------------------------------------------------------------------  Conclusions:  Focused TTE to re-evaluate the left ventricle and right  ventricle.  1. Endocardium not well visualized; grossly normal left  ventricular systolic function. Paradoxical septal motion  consistent with right ventricular overload.  2. Mild right ventricular enlargement with overall midlly  decreased right ventricular systolic function. TAPSE=1.3  cm. Right ventricular systolic dysfunction with preserved  apical wall motion is seen suggestive of Crystal's sign.  The RV apex is hyperkinetic.  3. Estimated pulmonary artery systolic pressure equals 45  mm Hg, assuming right atrial pressure equals 8 mm Hg,  consistent with mild pulmonary pressures.  *** Compared with echocardiogram of 1/13/2020, the right  ventricle and estimated PA systolic pressures appear  slightly improved. The LV was hyperdynamic on the prior  study.  ------------------------------------------------------------------------  Confirmed on  1/15/2020 - 14:10:24 by Lilly Whipple M.D.  ------------------------------------------------------------------------    < end of copied text >

## 2020-02-15 NOTE — CONSULT NOTE ADULT - ASSESSMENT
QT prolongation likely related to medications  amiodarone  gabapentin  cipro      s/p cardiac arrest related to saddle embolism    history of ESRD/transplant    complex hospital course previously at [facility] as outlined      Suggest  DC cipro and gabapentin  serial ekgs  consider reducing amiodarone dose to 100 mg daily after holding 5 days    follow up echo to reassess RV function ( elective)

## 2020-02-16 PROCEDURE — 99232 SBSQ HOSP IP/OBS MODERATE 35: CPT

## 2020-02-16 RX ADMIN — Medication 1 APPLICATION(S): at 05:30

## 2020-02-16 RX ADMIN — Medication 1 TABLET(S): at 14:00

## 2020-02-16 RX ADMIN — Medication 25 MILLIGRAM(S): at 18:16

## 2020-02-16 RX ADMIN — Medication 500000 UNIT(S): at 05:29

## 2020-02-16 RX ADMIN — LATANOPROST 1 DROP(S): 0.05 SOLUTION/ DROPS OPHTHALMIC; TOPICAL at 12:32

## 2020-02-16 RX ADMIN — FAMOTIDINE 10 MILLIGRAM(S): 10 INJECTION INTRAVENOUS at 14:00

## 2020-02-16 RX ADMIN — APIXABAN 5 MILLIGRAM(S): 2.5 TABLET, FILM COATED ORAL at 05:29

## 2020-02-16 RX ADMIN — Medication 100 MILLIGRAM(S): at 13:59

## 2020-02-16 RX ADMIN — OXYCODONE HYDROCHLORIDE 10 MILLIGRAM(S): 5 TABLET ORAL at 20:14

## 2020-02-16 RX ADMIN — Medication 1 APPLICATION(S): at 21:45

## 2020-02-16 RX ADMIN — Medication 1 APPLICATION(S): at 14:01

## 2020-02-16 RX ADMIN — Medication 500000 UNIT(S): at 14:00

## 2020-02-16 RX ADMIN — Medication 500000 UNIT(S): at 21:45

## 2020-02-16 RX ADMIN — Medication 250 MILLIGRAM(S): at 05:29

## 2020-02-16 RX ADMIN — OXYCODONE HYDROCHLORIDE 10 MILLIGRAM(S): 5 TABLET ORAL at 15:05

## 2020-02-16 RX ADMIN — BRIMONIDINE TARTRATE 1 DROP(S): 2 SOLUTION/ DROPS OPHTHALMIC at 14:00

## 2020-02-16 RX ADMIN — Medication 25 MILLIGRAM(S): at 05:29

## 2020-02-16 RX ADMIN — LACTULOSE 10 GRAM(S): 10 SOLUTION ORAL at 03:44

## 2020-02-16 RX ADMIN — OXYCODONE HYDROCHLORIDE 10 MILLIGRAM(S): 5 TABLET ORAL at 06:50

## 2020-02-16 RX ADMIN — Medication 500000 UNIT(S): at 01:04

## 2020-02-16 RX ADMIN — APIXABAN 5 MILLIGRAM(S): 2.5 TABLET, FILM COATED ORAL at 18:16

## 2020-02-16 RX ADMIN — Medication 250 MILLIGRAM(S): at 18:16

## 2020-02-16 RX ADMIN — BRIMONIDINE TARTRATE 1 DROP(S): 2 SOLUTION/ DROPS OPHTHALMIC at 05:30

## 2020-02-16 RX ADMIN — OXYCODONE HYDROCHLORIDE 10 MILLIGRAM(S): 5 TABLET ORAL at 21:06

## 2020-02-16 RX ADMIN — OXYCODONE HYDROCHLORIDE 10 MILLIGRAM(S): 5 TABLET ORAL at 14:25

## 2020-02-16 NOTE — PROVIDER CONTACT NOTE (OTHER) - SITUATION
IV meropenem ordered daily, to be given post dialysis on dialysis days. Pt has no IV access  Dr Scherer notified this morning @ 10:00, Dr Llamas notified initially @ 13:00, Dr Alcocer aware @ 1500

## 2020-02-16 NOTE — PROGRESS NOTE ADULT - COMMENTS
Patient in bed more alert than yesterday , siting up. Some mild confusion, reduced attention and needs redirection. Had BM--multple after lactulose yesterday, today also. Not watery.No N/V, feels better. Denies CP, SOB. Has not been able to get meropenem as unable to have access excpet for permacath site, taken off cipro due to prolonged QTC after being evaluated by hospitalist and cardiolgy

## 2020-02-16 NOTE — PROGRESS NOTE ADULT - SUBJECTIVE AND OBJECTIVE BOX
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 Young, followed by Dr. Raphael, [facility] Oncologist), RA with hand contractures who presented to [facility] on 12/27/19 from dialysis for a fever. 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 His post op course c/b DGF requiring HD, thrombocytopenia, elevated LDH and Haptoglobulin. Schistocytes  on peripheral smear concerning for TMA. Envarsus held. Received Belatacept 12/13. Started on PLEX (12/12, 12/15). Underwent renal bx on 12/13 c/w profound ATN.  Found to have much improvement to levels, likely related to Tacrolimus. He was discharged home on 12/20/19 with outpatient HD.     Patient was now sent to [facility] ED on 12/17/19 by dialysis center after he was found to be febrile to 102, he did not get HD this AM, and last full HD session was on 12/26. Upon arrival, pt was found to have the flu and positive urine culture with Enterobacter. ID was consulted and pt was started on Tamiflu and Meropenam. Transplant team and Nephrology was following patient for HD. Pt was taken to the OR on 1/1 for evacuation of perinephretic hematoma which was infected with enterobacter and biopsy of transplanted kidney. Biopsy revealed ATN. Pt was recommended to continue on Meropenam as per ID. Pt also had a CT guided pelvic fluid aspiration by IR on 1/6. Post procedure pt had significant hematuria with clots for which urology was consulted. Pt placed on CBI and hematuria resolved. Pt had multiple antibiotics and antifungals added as per ID recommendations to due to rising WBC. Pt was taken back to the OR on 1/10 for evacuation of RP hematoma removal of external iliac artery stent, bovine pericardial patch repair of ext iliac artery, and ligation of external iliac vein. Pt was emergently taken to the OR after IR for explantation of transplanted kidney and repair of bladder perforation by transplant surgery. Vascular surgery called intra-op and repaired the right external iliac artery was repaired with a bovine pericardial patch, repaired the right external iliac vein, performed a thrombectomy of the right lower extremity veins and had an IVC filter placed. He had a renal US and IR guided drain and pigtail placed on 1/15. He had CT chest/abdomen/pelvis done on 1/21, which demonstrated persistent collection and so the IR pigtail drain was repositioned on 1/22.     His course was notable on 1/8 pt was found to have RLE DVT involving ext iliac, common fem veins, s/p thromobectomy and IVC filter placement, currently on treatment. Hospital course was further complicated by cardiac arrest on 1/13 due to massive acute saddle pulmonary embolism with acute cor pulmonale and acute DVT of the brachial vein in RUE. Pt was started on Eliquis. His course was complicated on 2/1 with AVF infiltrated during HD with aterial extravasation/bleeding to the left upper arm and chest seen on CTa. On 2/3, he had an arteriography, which demonstrated a tiny pseudoaneurysm arising from a distal branch of the left deep brachial artery and so permacath was placed on 2/11. He was also noted to have newly diagnosed afib and cardiology was following for medication management. He was also noted to have thrombocytosis and hematalogy-oncology work up was done with justin for outpatient follow up. He was also noted to have hyponatremia, which remained stable.    Patient medically stabilized and transferred to [facility] 2/14/20 for IRF services. 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  Carpal tunnel syndrome  Brain cyst: had MRI recently- now gone  Anemia  Gastric ulcer: Long time ago  Rheumatoid arthritis  Renal transplant recipient  Breakdown (mechanical) of penile (implanted) prosthesis, sequela  End-stage renal disease (ESRD): PERMACATH PLACEMENT  Abscess of left buttock: s/p I &amp; D  AV fistula: placement left lower arm  S/P cystoscopy: stent placement &amp; removal for kidney stones, lithiotripsy  s/p Lt Carpal tunnel: 2011      MEDICATIONS  (STANDING):  allopurinol 100 milliGRAM(s) Oral daily  apixaban 5 milliGRAM(s) Oral two times a day  AQUAPHOR (petrolatum Ointment) 1 Application(s) Topical three times a day  brimonidine 0.2% Ophthalmic Solution 1 Drop(s) Both EYES three times a day  epoetin trey Injectable 99399 Unit(s) IV Push <User Schedule>  famotidine    Tablet 10 milliGRAM(s) Oral daily  latanoprost 0.005% Ophthalmic Solution 1 Drop(s) Both EYES daily  meropenem  IVPB 500 milliGRAM(s) IV Intermittent every 24 hours  metoprolol tartrate 25 milliGRAM(s) Oral every 12 hours  Nephro-candace 1 Tablet(s) Oral daily  nystatin    Suspension 132149 Unit(s) Oral four times a day  polyethylene glycol 3350 17 Gram(s) Oral daily  saccharomyces boulardii 250 milliGRAM(s) Oral two times a day  senna 2 Tablet(s) Oral at bedtime    MEDICATIONS  (PRN):  acetaminophen   Tablet .. 975 milliGRAM(s) Oral every 6 hours PRN Mild Pain (1 - 3)  benzocaine 15 mG/menthol 3.6 mG (Sugar-Free) Lozenge 1 Lozenge Oral three times a day PRN Sore Throat  bisacodyl Suppository 10 milliGRAM(s) Rectal daily PRN Constipation  lactulose Syrup 10 Gram(s) Oral daily PRN constipation  oxyCODONE    IR 5 milliGRAM(s) Oral every 4 hours PRN Moderate Pain (4 - 6)  oxyCODONE    IR 10 milliGRAM(s) Oral every 4 hours PRN Severe Pain (7 - 10)      Allergies    coconut (Anaphylaxis)  morphine (Pruritus; Rash)    Intolerances          VITALS  49y  Vital Signs Last 24 Hrs  T(C): 36.8 (15 Feb 2020 20:19), Max: 37 (15 Feb 2020 18:55)  T(F): 98.2 (15 Feb 2020 20:19), Max: 98.6 (15 Feb 2020 18:55)  HR: 67 (15 Feb 2020 20:19) (62 - 67)  BP: 154/84 (15 Feb 2020 20:19) (147/69 - 154/84)  BP(mean): --  RR: 14 (15 Feb 2020 20:19) (14 - 16)  SpO2: 95% (15 Feb 2020 20:19) (94% - 95%)  Daily     Daily         RECENT LABS:                          8.7    11.99 )-----------( 619      ( 15 Feb 2020 06:00 )             27.1     02-15    139  |  101  |  27<H>  ----------------------------<  101<H>  4.0   |  28  |  5.96<H>    Ca    8.8      15 Feb 2020 06:00    TPro  6.9  /  Alb  1.9<L>  /  TBili  1.3<H>  /  DBili  x   /  AST  37  /  ALT  8<L>  /  AlkPhos  211<H>  02-15    LIVER FUNCTIONS - ( 15 Feb 2020 06:00 )  Alb: 1.9 g/dL / Pro: 6.9 g/dL / ALK PHOS: 211 U/L / ALT: 8 U/L / AST: 37 U/L / GGT: x                 Culture - Group A Streptococcus (collected 02-12-20 @ 23:18)  Source: .Throat  Final Report (02-14-20 @ 13:11):    No Streptococcus pyogenes (Group A) isolated        CAPILLARY BLOOD GLUCOSE

## 2020-02-16 NOTE — PROVIDER CONTACT NOTE (OTHER) - BACKGROUND
Pt has Left AV fistula with deep brachial artery pseudoaneurysm, positive for Right upper extremity DVT Pt has Right chest wall permacath accessed by dialysis only

## 2020-02-16 NOTE — PROGRESS NOTE ADULT - ASSESSMENT
ASSESSMENT/PLAN  49M with PMHx of ESRD on HD ( via LUE AVF), HTN, Gout, Glaucoma, NET of pancreas (s/p robotic distal panc/splenectomy at [place] 2015 by Dr. Young, followed by Dr. Raphael, [facility] Oncologist), RA with hand contractures, DDRT 12/8/18 c/b DGF requiring who presented to [facility] on 12/27/19 from dialysis for a fever found to be flu positive treated with tamiflu and have enterobacter infected perinephretic hematoma being admitted here  with Gait Instability, ADL impairments and Functional impairments. His course was complicated by RP hematoma and bladder perforation s/p repair and evacuation on 1/10, saddle PE s/p IVC filter on 1/10, acute DVT of RUE brachial vein, LUE AVF bleeding left deep brachial artery psuedoaneurysm, and cardiac arrest.       #ebility due to prolonged and complicated hospital course  -continue Comprehensive Rehab Program of PT/OT  restorative dining added due to difficulty with container opening/hand use    #DDRT: c/b DGF/ATN/perinephric infected hematoma/pseudoneurysm/graft nephrectomy   - S/P AVF cannulation for HD c/b infiltration causing extravasation/bleeding to the left upper arm and chest noted on CTA  - S/P IR LUE arteriography showing a tiny pseudoaneurysm arising from a distal branch of the left deep brachial artery s/p dissection of a tiny feeder artery that resulted in resolution of the pseudoaneurysm.  - S/P IR permacath placement with TTS HD  - Eliquis 5mg BID started 2/12 and hold Aspirin    - Continue Ciprofloxacin to complete antibiotic course (End date 2/20) per outside records-->to be switched to meropenem due to prolongation QTC. Will need access, hospitalist to assist  - pain control: Tylenol, Oxycodone, Neurontin  - Wound care consult  appreciated 2/14: Recommendation: for the 2 wounds of the left upper arm area and the right groin area, suggest clean gently daily with normal saline, pat dry, then apply strip of aquacel ag, add few drops of normal saline as needed for aquacel to adhere to wound, cover with 2 inch gauze and paper tape, change daily.  - Thigh high compression stocking/ACE wrap to bilateral LE    - ACE wrap LUE from hand to shoulder  - Nephrology consult appreciated    #Saddle PE/multiple DVT: RUE brachial DVT,  R distal brachia vein , acute DVT of R external iliac, common femoral, femoral veins, R soleal vein   - S/p IVC filter on 1/9  - Apixaban 5mg BID  -monitor H/H, cardiopulmonary status  -hospitalist consult    #New Afib/Vtach:  -Continue metoprolol  -Patient with prolonged QTC. Seen by hospitalist and cardiology 2/15:  ·	DC cipro and gabapentin  ·	serial ekgs  ·	consider reducing amiodarone dose to 100 mg daily after holding 5 days  -EKG ordered for 2/16    #HTN:  -Continue metoprolol and hold on HD days per outside hospital    #ESRD: on HD  -Nephrology consult. contacted, on T/Th. Sat  -permacath care  -Epogen, nephrovite    #Glaucoma:  -Continue with drops    #Gout:  -Allopurinol    #RA:  -Continue with comprehensive rehab  -Continue with pain control as below    #Thrombocytosis:  -Plt 650--> 619 (2/15)  -Hematology follow up as an outpatient    #Hyponatremia:  -134-->139 (2.15) improved    #Anemia:  -Continue with epogen  -Hgb 8.7 (2/15)    #Pain Mgmt   - Tylenol PRN, Oxycodone PRN, Neurontin    #GI/Bowel Mgmt   -  Continent c/w  Senna,  Miralax  -add lactulose PRN. Patient had BM in response to lactulose, constipation resolved    #/Bladder Mgmt - HD    #FEN   - Diet - Regular + Thins, Renal Restrictions  - famotidine  -nutrition consult    LABS:  during HD  EKG  hospitalist follow up re: access for meropenem ASSESSMENT/PLAN  49M with PMHx of ESRD on HD ( via LUE AVF), HTN, Gout, Glaucoma, NET of pancreas (s/p robotic distal panc/splenectomy at [place] 2015 by Dr. Young, followed by Dr. Raphael, [facility] Oncologist), RA with hand contractures, DDRT 12/8/18 c/b DGF requiring who presented to [facility] on 12/27/19 from dialysis for a fever found to be flu positive treated with tamiflu and have enterobacter infected perinephretic hematoma being admitted here  with Gait Instability, ADL impairments and Functional impairments. His course was complicated by RP hematoma and bladder perforation s/p repair and evacuation on 1/10, saddle PE s/p IVC filter on 1/10, acute DVT of RUE brachial vein, LUE AVF bleeding left deep brachial artery psuedoaneurysm, and cardiac arrest.       #ebility due to prolonged and complicated hospital course  -continue Comprehensive Rehab Program of PT/OT  restorative dining added due to difficulty with container opening/hand use    #DDRT: c/b DGF/ATN/perinephric infected hematoma/pseudoneurysm/graft nephrectomy   - S/P AVF cannulation for HD c/b infiltration causing extravasation/bleeding to the left upper arm and chest noted on CTA  - S/P IR LUE arteriography showing a tiny pseudoaneurysm arising from a distal branch of the left deep brachial artery s/p dissection of a tiny feeder artery that resulted in resolution of the pseudoaneurysm.  - S/P IR permacath placement with TTS HD  - Eliquis 5mg BID started 2/12 and hold Aspirin    - Continue Ciprofloxacin to complete antibiotic course (End date 2/20) per outside records-->to be switched to meropenem due to prolongation QTC. Will need access, hospitalist to assist  - pain control: Tylenol, Oxycodone, Neurontin  - Wound care consult  appreciated 2/14: Recommendation: for the 2 wounds of the left upper arm area and the right groin area, suggest clean gently daily with normal saline, pat dry, then apply strip of aquacel ag, add few drops of normal saline as needed for aquacel to adhere to wound, cover with 2 inch gauze and paper tape, change daily.  - Thigh high compression stocking/ACE wrap to bilateral LE    - ACE wrap LUE from hand to shoulder  - Nephrology consult appreciated    #Saddle PE/multiple DVT: RUE brachial DVT,  R distal brachia vein , acute DVT of R external iliac, common femoral, femoral veins, R soleal vein   - S/p IVC filter on 1/9  - Apixaban 5mg BID  -monitor H/H, cardiopulmonary status  -hospitalist consult    #New Afib/Vtach:  -Continue metoprolol  -Patient with prolonged QTC. Seen by hospitalist and cardiology 2/15:  ·	DC cipro and gabapentin  ·	serial ekgs  ·	consider reducing amiodarone dose to 100 mg daily after holding 5 days  -EKG ordered for 2/16    #HTN:  -Continue metoprolol and hold on HD days per outside hospital    #ESRD: on HD  -Nephrology consult. contacted, on T/Th. Sat  -permacath care  -Epogen, nephrovite    #Glaucoma:  -Continue with drops    #Gout:  -Allopurinol    #RA:  -Continue with comprehensive rehab  -Continue with pain control as below  -rheumatology consult    #Thrombocytosis:  -Plt 650--> 619 (2/15)  -Hematology follow up as an outpatient    #Hyponatremia:  -134-->139 (2.15) improved    #Anemia:  -Continue with epogen  -Hgb 8.7 (2/15)    #Pain Mgmt   - Tylenol PRN, Oxycodone PRN, Neurontin    #GI/Bowel Mgmt   -  Continent c/w  Senna,  Miralax  -add lactulose PRN. Patient had BM in response to lactulose, constipation resolved    #/Bladder Mgmt - HD    #FEN   - Diet - Regular + Thins, Renal Restrictions  - famotidine  -nutrition consult    LABS:  during HD  EKG  hospitalist follow up re: access for meropenem  rheumatology

## 2020-02-17 PROCEDURE — 99233 SBSQ HOSP IP/OBS HIGH 50: CPT

## 2020-02-17 PROCEDURE — 93010 ELECTROCARDIOGRAM REPORT: CPT

## 2020-02-17 RX ADMIN — OXYCODONE HYDROCHLORIDE 10 MILLIGRAM(S): 5 TABLET ORAL at 09:45

## 2020-02-17 RX ADMIN — Medication 500000 UNIT(S): at 17:51

## 2020-02-17 RX ADMIN — BRIMONIDINE TARTRATE 1 DROP(S): 2 SOLUTION/ DROPS OPHTHALMIC at 11:19

## 2020-02-17 RX ADMIN — Medication 500000 UNIT(S): at 11:18

## 2020-02-17 RX ADMIN — Medication 1 TABLET(S): at 11:18

## 2020-02-17 RX ADMIN — Medication 25 MILLIGRAM(S): at 17:50

## 2020-02-17 RX ADMIN — Medication 500000 UNIT(S): at 05:17

## 2020-02-17 RX ADMIN — APIXABAN 5 MILLIGRAM(S): 2.5 TABLET, FILM COATED ORAL at 17:50

## 2020-02-17 RX ADMIN — Medication 100 MILLIGRAM(S): at 11:18

## 2020-02-17 RX ADMIN — OXYCODONE HYDROCHLORIDE 10 MILLIGRAM(S): 5 TABLET ORAL at 17:50

## 2020-02-17 RX ADMIN — OXYCODONE HYDROCHLORIDE 10 MILLIGRAM(S): 5 TABLET ORAL at 06:17

## 2020-02-17 RX ADMIN — Medication 25 MILLIGRAM(S): at 05:18

## 2020-02-17 RX ADMIN — OXYCODONE HYDROCHLORIDE 10 MILLIGRAM(S): 5 TABLET ORAL at 18:39

## 2020-02-17 RX ADMIN — Medication 250 MILLIGRAM(S): at 17:51

## 2020-02-17 RX ADMIN — OXYCODONE HYDROCHLORIDE 10 MILLIGRAM(S): 5 TABLET ORAL at 05:25

## 2020-02-17 RX ADMIN — LATANOPROST 1 DROP(S): 0.05 SOLUTION/ DROPS OPHTHALMIC; TOPICAL at 11:18

## 2020-02-17 RX ADMIN — Medication 1 APPLICATION(S): at 05:17

## 2020-02-17 RX ADMIN — SENNA PLUS 2 TABLET(S): 8.6 TABLET ORAL at 21:39

## 2020-02-17 RX ADMIN — APIXABAN 5 MILLIGRAM(S): 2.5 TABLET, FILM COATED ORAL at 05:18

## 2020-02-17 RX ADMIN — Medication 1 APPLICATION(S): at 13:06

## 2020-02-17 RX ADMIN — FAMOTIDINE 10 MILLIGRAM(S): 10 INJECTION INTRAVENOUS at 11:18

## 2020-02-17 RX ADMIN — Medication 250 MILLIGRAM(S): at 05:48

## 2020-02-17 RX ADMIN — BRIMONIDINE TARTRATE 1 DROP(S): 2 SOLUTION/ DROPS OPHTHALMIC at 21:39

## 2020-02-17 RX ADMIN — Medication 1 APPLICATION(S): at 21:38

## 2020-02-17 RX ADMIN — OXYCODONE HYDROCHLORIDE 10 MILLIGRAM(S): 5 TABLET ORAL at 11:18

## 2020-02-17 NOTE — PROGRESS NOTE ADULT - EXTREMITIES COMMENTS
bialteral ACE wraps  toes: wiggles bilaterally  capillary refill 2  good temperature
BLE with ACE wraps in place  good color distally, wiggle toes  1+ LE swelling  RUE 1+ swelilng

## 2020-02-17 NOTE — PROGRESS NOTE ADULT - SUBJECTIVE AND OBJECTIVE BOX
Patient is a 49y old  Male who presents with a chief complaint of Debility (2020 14:15)      HPI:  49M with PMHx of ESRD on HD ( via LUE AVF), HTN, Gout, Glaucoma, NET of pancreas (s/p robotic distal panc/splenectomy at [place]  by Dr. Young, followed by Dr. Raphael, [facility] Oncologist), RA with hand contractures who presented to [facility] on 19 from dialysis for a fever. Of note, patient was recently admitted and underwent DDRT on 19 (ureteral stent sutured to Kennedy - removed 12/15). His post op course c/b DGF requiring HD, thrombocytopenia, elevated LDH and Haptoglobulin. Schistocytes  on peripheral smear concerning for TMA. Envarsus held. Received Belatacept . Started on PLEX (, 12/15). Underwent renal bx on  c/w profound ATN.  Found to have much improvement to levels, likely related to Tacrolimus. He was discharged home on 19 with outpatient HD.     Patient was now sent to [facility] ED on 19 by dialysis center after he was found to be febrile to 102, he did not get HD this AM, and last full HD session was on . Upon arrival, pt was found to have the flu and positive urine culture with Enterobacter. ID was consulted and pt was started on Tamiflu and Meropenam. Transplant team and Nephrology was following patient for HD. Pt was taken to the OR on  for evacuation of perinephretic hematoma which was infected with enterobacter and biopsy of transplanted kidney. Biopsy revealed ATN. Pt was recommended to continue on Meropenam as per ID. Pt also had a CT guided pelvic fluid aspiration by IR on . Post procedure pt had significant hematuria with clots for which urology was consulted. Pt placed on CBI and hematuria resolved. Pt had multiple antibiotics and antifungals added as per ID recommendations to due to rising WBC. Pt was taken back to the OR on 1/10 for evacuation of RP hematoma removal of external iliac artery stent, bovine pericardial patch repair of ext iliac artery, and ligation of external iliac vein. Pt was emergently taken to the OR after IR for explantation of transplanted kidney and repair of bladder perforation by transplant surgery. Vascular surgery called intra-op and repaired the right external iliac artery was repaired with a bovine pericardial patch, repaired the right external iliac vein, performed a thrombectomy of the right lower extremity veins and had an IVC filter placed. He had a renal US and IR guided drain and pigtail placed on 1/15. He had CT chest/abdomen/pelvis done on , which demonstrated persistent collection and so the IR pigtail drain was repositioned on .     His course was notable on  pt was found to have RLE DVT involving ext iliac, common fem veins, s/p thromobectomy and IVC filter placement, currently on treatment. Hospital course was further complicated by cardiac arrest on  due to massive acute saddle pulmonary embolism with acute cor pulmonale and acute DVT of the brachial vein in RUE. Pt was started on Eliquis. His course was complicated on  with AVF infiltrated during HD with aterial extravasation/bleeding to the left upper arm and chest seen on CTa. On 2/3, he had an arteriography, which demonstrated a tiny pseudoaneurysm arising from a distal branch of the left deep brachial artery and so permacath was placed on . He was also noted to have newly diagnosed afib and cardiology was following for medication management. He was also noted to have thrombocytosis and hematalogy-oncology work up was done with justin for outpatient follow up. He was also noted to have hyponatremia, which remained stable.    Patient medically stabilized and transferred to [facility] 20 for IRF services. 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  Carpal tunnel syndrome  Brain cyst: had MRI recently- now gone  Anemia  Gastric ulcer: Long time ago  Rheumatoid arthritis  Renal transplant recipient  Breakdown (mechanical) of penile (implanted) prosthesis, sequela  End-stage renal disease (ESRD): PERMACATH PLACEMENT  Abscess of left buttock: s/p I &amp; D  AV fistula: placement left lower arm  S/P cystoscopy: stent placement &amp; removal for kidney stones, lithiotripsy  s/p Lt Carpal tunnel:       MEDICATIONS  (STANDING):  allopurinol 100 milliGRAM(s) Oral daily  apixaban 5 milliGRAM(s) Oral two times a day  AQUAPHOR (petrolatum Ointment) 1 Application(s) Topical three times a day  brimonidine 0.2% Ophthalmic Solution 1 Drop(s) Both EYES three times a day  epoetin trey Injectable 87222 Unit(s) IV Push <User Schedule>  famotidine    Tablet 10 milliGRAM(s) Oral daily  latanoprost 0.005% Ophthalmic Solution 1 Drop(s) Both EYES daily  meropenem  IVPB 500 milliGRAM(s) IV Intermittent every 24 hours  metoprolol tartrate 25 milliGRAM(s) Oral every 12 hours  Nephro-candace 1 Tablet(s) Oral daily  nystatin    Suspension 028698 Unit(s) Oral four times a day  polyethylene glycol 3350 17 Gram(s) Oral daily  saccharomyces boulardii 250 milliGRAM(s) Oral two times a day  senna 2 Tablet(s) Oral at bedtime    MEDICATIONS  (PRN):  acetaminophen   Tablet .. 975 milliGRAM(s) Oral every 6 hours PRN Mild Pain (1 - 3)  benzocaine 15 mG/menthol 3.6 mG (Sugar-Free) Lozenge 1 Lozenge Oral three times a day PRN Sore Throat  bisacodyl Suppository 10 milliGRAM(s) Rectal daily PRN Constipation  lactulose Syrup 10 Gram(s) Oral daily PRN constipation  oxyCODONE    IR 5 milliGRAM(s) Oral every 4 hours PRN Moderate Pain (4 - 6)  oxyCODONE    IR 10 milliGRAM(s) Oral every 4 hours PRN Severe Pain (7 - 10)      Allergies    coconut (Anaphylaxis)  morphine (Pruritus; Rash)    Intolerances          VITALS  49y  Vital Signs Last 24 Hrs  T(C): 36.5 (2020 07:50), Max: 36.9 (2020 18:15)  T(F): 97.7 (2020 07:50), Max: 98.4 (2020 18:15)  HR: 59 (2020 07:50) (59 - 64)  BP: 160/89 (2020 07:50) (147/83 - 160/89)  BP(mean): --  RR: 14 (2020 07:50) (14 - 14)  SpO2: 97% (2020 07:50) (95% - 97%)  Daily     Daily Weight in k.8 (2020 05:34)        RECENT LABS:                    Culture - Group A Streptococcus (collected 20 @ 23:18)  Source: .Throat  Final Report (20 @ 13:11):    No Streptococcus pyogenes (Group A) isolated        CAPILLARY BLOOD GLUCOSE

## 2020-02-17 NOTE — PROGRESS NOTE ADULT - COMMENTS
Patient afebrile overnight does not have access for IV meropenem. Will repeat EKG ; if QTC improved, plan to resume cipro, if still prolonged, plan on 1g dose meropenem with HD tomorrow 2/18 as discussed with ID and hosptialist today. This was relayed to patient; he asked that I speak with his mother, which I did this afternoon  [phone].    Patient otherwise stable, wants to do therapy, +fatigue and SOB with activity no CP none

## 2020-02-17 NOTE — PROVIDER CONTACT NOTE (OTHER) - SITUATION
Pt has an order to receive IV meropenem ,but not received yet because of no IV access.Pt has DVT on right upper arm and pseudoaneurysm on left arm.  was made aware earlier during day time.

## 2020-02-17 NOTE — CONSULT NOTE ADULT - SUBJECTIVE AND OBJECTIVE BOX
HPI:   Patient is a 49y male with RA, neuroendocrine tumor of pancreas. , esrd, gout. He had distal pancreatectomy and splenectomy in the past but does not remember when. In December he underwent renal transplant, had delayed graft function so needed dialysis and also developed microangiopathic hemolytic anemia and required beternacept and plex. He had a kidney bx showing atn. He went home on dialysis via an avf but at the very end of December he came back with a fever, had enterobacter in urine. He then was found to have perinephric hematoma and aspirate grew enterobacter. He had a drain placed but ultimately required transplant kidney removal . He also had some injury to the arteries and needed a stent and repair with concern of artery infection so relegated to 6 weeks of iv antibiotics directed at the enterobacter with meropenem. He has just 3 more days left of treatment. Of note he also had saddle PE. His avf infiltrated and he had a permacath placed for dialysis. We are called today because he has no iv access except for the permacath, is very poor stick and has a prolonged qtc so the original plan to use po cipro  finishc his antibiotics. cannot be done . patient feels ok today, just some wound pain in the right groin and left arm is very swollen.     REVIEW OF SYSTEMS:  All other review of systems negative (Comprehensive ROS)    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  Carpal tunnel syndrome  Brain cyst: had MRI recently- now gone  Anemia  Gastric ulcer: Long time ago  Rheumatoid arthritis  Renal transplant recipient  Breakdown (mechanical) of penile (implanted) prosthesis, sequela  End-stage renal disease (ESRD): PERMACATH PLACEMENT  Abscess of left buttock: s/p I &amp; D  AV fistula: placement left lower arm  S/P cystoscopy: stent placement &amp; removal for kidney stones, lithiotripsy  s/p Lt Carpal tunnel: 2011      Allergies    coconut (Anaphylaxis)  morphine (Pruritus; Rash)    Intolerances        Antimicrobials Day #  :day 39/42  meropenem  IVPB 500 milliGRAM(s) IV Intermittent every 24 hours  nystatin    Suspension 234884 Unit(s) Oral four times a day    Other Medications:  acetaminophen   Tablet .. 975 milliGRAM(s) Oral every 6 hours PRN  allopurinol 100 milliGRAM(s) Oral daily  apixaban 5 milliGRAM(s) Oral two times a day  AQUAPHOR (petrolatum Ointment) 1 Application(s) Topical three times a day  benzocaine 15 mG/menthol 3.6 mG (Sugar-Free) Lozenge 1 Lozenge Oral three times a day PRN  bisacodyl Suppository 10 milliGRAM(s) Rectal daily PRN  brimonidine 0.2% Ophthalmic Solution 1 Drop(s) Both EYES three times a day  epoetin trey Injectable 45628 Unit(s) IV Push <User Schedule>  famotidine    Tablet 10 milliGRAM(s) Oral daily  lactulose Syrup 10 Gram(s) Oral daily PRN  latanoprost 0.005% Ophthalmic Solution 1 Drop(s) Both EYES daily  metoprolol tartrate 25 milliGRAM(s) Oral every 12 hours  Nephro-candace 1 Tablet(s) Oral daily  oxyCODONE    IR 5 milliGRAM(s) Oral every 4 hours PRN  oxyCODONE    IR 10 milliGRAM(s) Oral every 4 hours PRN  polyethylene glycol 3350 17 Gram(s) Oral daily  saccharomyces boulardii 250 milliGRAM(s) Oral two times a day  senna 2 Tablet(s) Oral at bedtime      FAMILY HISTORY:  Family history of myocardial infarction in first degree male relative  Family history of rheumatoid arthritis  Family history of hypertension in mother  Family history of cerebral aneurysm (Sibling)      SOCIAL HISTORY:  Smoking: [ ]Yes [n ]No  ETOH: [ ]Yes [ ]nNo  Drug Use: [ ]Yes [n ]No   [ ] Single[n ]    T(F): 97.7 (02-17-20 @ 07:50), Max: 98.4 (02-16-20 @ 18:15)  HR: 59 (02-17-20 @ 07:50)  BP: 160/89 (02-17-20 @ 07:50)  RR: 14 (02-17-20 @ 07:50)  SpO2: 97% (02-17-20 @ 07:50)  Wt(kg): --    PHYSICAL EXAM:  General: alert, no acute distress  Eyes:  anicteric, no conjunctival injection, no discharge  Oropharynx: no lesions or injection 	  Neck: supple, without adenopathy  Lungs: clear to auscultation  Heart: regular rate and rhythm; no murmur, rubs or gallops  Abdomen: soft, edema of wall, distended, nontender, without mass or organomegaly, groin wound is ok  Skin: no lesions  Extremities: no clubbing, cyanosis. legs wrapped, declined take down of ace, massive lue edema  Neurologic: alert, oriented, moves all extremities    LAB RESULTS:    Complete Blood Count + Automated Diff (02.15.20 @ 06:00)    WBC Count: 11.99 K/uL    RBC Count: 2.88 M/uL    Hemoglobin: 8.7 g/dL    Hematocrit: 27.1 %    Mean Cell Volume: 94.1 fl    Mean Cell Hemoglobin: 30.2 pg    Mean Cell Hemoglobin Conc: 32.1 gm/dL    Red Cell Distrib Width: 15.4 %    Platelet Count - Automated: 619 K/uL    Auto Neutrophil #: 7.20 K/uL    Auto Lymphocyte #: 1.51 K/uL    Auto Monocyte #: 2.15 K/uL    Auto Eosinophil #: 0.88 K/uL    Auto Basophil #: 0.10 K/uL    Auto Neutrophil %: 60.1: Differential percentages must be correlated with absolute numbers for  clinical significance. %    Auto Lymphocyte %: 12.6 %    Auto Monocyte %: 17.9 %    Auto Eosinophil %: 7.3 %    Auto Basophil %: 0.8 %    Auto Immature Granulocyte %: 1.3 %    Nucleated RBC: 0 /100 WBCs                MICROBIOLOGY:  RECENT CULTURES:  02-12 @ 23:18 .Throat     No Streptococcus pyogenes (Group A) isolated    Culture - Tissue with Gram Stain (01.10.20 @ 06:07)    -  Ceftolozane/tazobactam: R >8    -  Ceftazidime/Avibactam: S <=4    -  Piperacillin/Tazobactam: R >64    -  Meropenem: S <=1    -  Imipenem: S <=1    -  Levofloxacin: S <=2    -  Ertapenem: R    -  Gentamicin: S <=2    -  Trimethoprim/Sulfamethoxazole: R >2/38    -  Tobramycin: S <=2    -  Cefoxitin: R >16    -  Cefepime: I 16    -  Aztreonam: R >16    -  Cefazolin: R >16 Enterobacter, Citrobacter, and Serratia may develop resistance during prolonged therapy (3-4 days)    -  Ampicillin/Sulbactam: R >16/8 Enterobacter, Citrobacter, and Serratia may develop resistance during prolonged therapy (3-4 days)    -  Ampicillin: R >16 These ampicillin results predict results for amoxicillin    -  Amoxicillin/Clavulanic Acid: R >16/8    -  Amikacin: S <=16    -  Ciprofloxacin: S <=1    -  Ceftriaxone: R >32 Enterobacter, Citrobacter, and Serratia may develop resistance during prolonged therapy    Gram Stain:   Few polymorphonuclear leukocytes seen per low power field  No organisms seen per oil power field    Specimen Source: .Tissue 1. retroperitoneal hematoma    Culture Results:   Rare Enterobacter cloacae (Carbapenem Resistant) complex    Organism Identification: Enterobacter cloacae (Carbapenem Resistant)    Organism: Enterobacter cloacae (Carbapenem Resistant)    Method Type: MANDI            RADIOLOGY REVIEWED:    < from: CT Angio Chest w/ IV Cont (02.01.20 @ 22:32) >  EXAM:  CT ANGIO UPR EXT (W)AW IC LT                          EXAM:  CT ANGIO CHEST (W)AW IC                            PROCEDURE DATE:  02/01/2020            INTERPRETATION:  CLINICAL INFORMATION: Status post IV infiltration during dialysis. Concern for AVF extravasation.    COMPARISON: CT chest 1/20/2020 and upper extremity ultrasound 2/1/2020.    PROCEDURE:   CT Angiography of the Chest and left upper extremity.  100 ml of Omnipaque 350 was injected intravenously.   Sagittal and coronal reformats were performed as well as 3D (MIP) reconstructions.      FINDINGS:    LUNGS AND AIRWAYS: Patent central airways.  Bibasilar atelectasis.    PLEURA: Small bilateral pleural effusion.    MEDIASTINUM AND EDUIN: No lymphadenopathy.    VESSELS: Redemonstrated bilateral subsegmental lower lobe pulmonary emboli. Atherosclerotic calcifications of the aorta and coronary arteries.    HEART: Heart size is normal. No pericardial effusion.    CHEST WALL AND LOWER NECK: Left thyroid lobe calcification.    VISUALIZED UPPER ABDOMEN: Anasarca. IVC filter. Cholelithiasis. Status post distal pancreatectomy.    BONES: Left hip ORIF. Unchanged sclerotic focus in the right iliac bone. Healed posterior left rib fractures. Right lower quadrant fluid collection atthe site of transplant nephrectomy with indwelling drainage catheter, now measuring 5.2 x 1.4 cm, decreased from prior, previously measuring 5.5 x 2.4 cm.    LEFT UPPER EXTREMITY:     Patient status post radial-cephalic AV fistula.    Large left upper extremity hematoma measuring 17.3 x 5.0 cm which extends into the left axilla with active arterial extravasation which persists on more delayed imaging (10, 250). Significant adjacent soft tissue infiltration.    Additional hematoma in the left anterior chest wall measuring 8 x 4.7 cm.    The outflow vein is patent however has aneurysmal dilatation measuring up to 1.2 cm.      IMPRESSION:     Large left upper extremity hematoma measuring 17.3 x 5.0 cm which extends into the left axilla with active arterial extravasation which persists on more delayed imaging.    These findings were discussed with Dr. May at 2/1/2020 10:58 PM by Dr. Hernandez with read back.    < end of copied text >        Impression: patient with recent kidney transplant, post op microangiopathic hemolytic anemia, delayed graft function, ultimately developed infected graft, hematoma, vessel injury, required transplant kidney removal, vessel repair, had saddle PE, had infiltrated avf so permacath placed. he eventually came to rehab with plan to change meropenem to cipro upon transfer to finish what is left of the 42 days of the course of antibiotics. He has a very prolonged qtc confirmed by my own calculation and has no iv access aside from the permacath. He cannot get a pvl and a central line seems risky given his limited access, anticoagulation, need for ongoing dialysis so don't want to mess up his veins and only needs 3 more days of antibiotics.       Recommendations:  cannot use cipro  would give a dose of amikacin after dialysis tomorrow via his permacath while at dialysis which will stay in the system until his next dialysis and be enough to finish the course. Needs 500mg of amikacin for one dose only . If cannot get the amikacin with dialysis would just stop antibiotics altogether.

## 2020-02-17 NOTE — PROGRESS NOTE ADULT - ASSESSMENT
ASSESSMENT/PLAN  49M with PMHx of ESRD on HD ( via LUE AVF), HTN, Gout, Glaucoma, NET of pancreas (s/p robotic distal panc/splenectomy at [place] 2015 by Dr. Young, followed by Dr. Raphael, [facility] Oncologist), RA with hand contractures, DDRT 12/8/18 c/b DGF requiring who presented to [facility] on 12/27/19 from dialysis for a fever found to be flu positive treated with tamiflu and have enterobacter infected perinephretic hematoma being admitted here  with Gait Instability, ADL impairments and Functional impairments. His course was complicated by RP hematoma and bladder perforation s/p repair and evacuation on 1/10, saddle PE s/p IVC filter on 1/10, acute DVT of RUE brachial vein, LUE AVF bleeding left deep brachial artery psuedoaneurysm, and cardiac arrest.       #Debility due to prolonged and complicated hospital course  -continue Comprehensive Rehab Program of PT/OT  restorative dining added due to difficulty with container opening/hand use  consider neuropsychology for support due to anxiety    #DDRT: c/b DGF/ATN/perinephric infected hematoma/pseudoneurysm/graft nephrectomy   - S/P AVF cannulation for HD c/b infiltration causing extravasation/bleeding to the left upper arm and chest noted on CTA  - S/P IR LUE arteriography showing a tiny pseudoaneurysm arising from a distal branch of the left deep brachial artery s/p dissection of a tiny feeder artery that resulted in resolution of the pseudoaneurysm.  - S/P IR permacath placement with TTS HD  - Eliquis 5mg BID started 2/12 and hold Aspirin    - Continue Ciprofloxacin to complete antibiotic course (End date 2/20) per outside records-->to be switched to meropenem due to prolongation QTC. Will need access, however only has tessio cath for HD, which he gets T/TH/ Sat. Discussed with hospitalist, who also reviewed case with ID. Patient requires ABx until 3/20; due toDVT, fistula, would need central line for IV Abx unless used current cath. recommend REPEAT EKG; If QTC still prolonged, will give 1g dose meropenem with HD tomorrow. IF QTC imrpoved/normalized, can resume cipro  =-this was discussed with patient and mother 2/17  - pain control: Tylenol, Oxycodone, Neurontin  - Wound care consult  appreciated 2/14: Recommendation: for the 2 wounds of the left upper arm area and the right groin area, suggest clean gently daily with normal saline, pat dry, then apply strip of aquacel ag, add few drops of normal saline as needed for aquacel to adhere to wound, cover with 2 inch gauze and paper tape, change daily.  - Thigh high compression stocking/ACE wrap to bilateral LE    - ACE wrap LUE from hand to shoulder  - Nephrology consult appreciated    #Saddle PE/multiple DVT: RUE brachial DVT,  R distal brachia vein , acute DVT of R external iliac, common femoral, femoral veins, R soleal vein   - S/p IVC filter on 1/9  - Apixaban 5mg BID  -monitor H/H, cardiopulmonary status  -hospitalist consult    #New Afib/Vtach:  -Continue metoprolol  -Patient with prolonged QTC. Seen by hospitalist and cardiology 2/15:  ·	DC cipro and gabapentin  ·	serial ekgs  ·	consider reducing amiodarone dose to 100 mg daily after holding 5 days  -EKG ordered for 2/17. Cardiology recs reviewed with patient and mother    #HTN:  -Continue metoprolol and hold on HD days per outside hospital    #ESRD: on HD  -Nephrology consult. contacted, on T/Th. Sat  -permacath care  -Epogen, nephrovite    #Glaucoma:  -Continue with drops    #Gout:  -Allopurinol    #RA:  -Continue with comprehensive rehab  -Continue with pain control as below  -rheumatology consult    #Thrombocytosis:  -Plt 650--> 619 (2/15)  -Hematology follow up as an outpatient    #Hyponatremia:  -134-->139 (2.15) improved    #Anemia:  -Continue with epogen  -Hgb 8.7 (2/15)    #Pain Mgmt   - Tylenol PRN, Oxycodone PRN, Neurontin    #GI/Bowel Mgmt   -  Continent c/w  Senna,  Miralax  -add lactulose PRN. Patient had BM in response to lactulose, constipation resolved    #/Bladder Mgmt - HD    #FEN   - Diet - Regular + Thins, Renal Restrictions  - famotidine  -nutrition consult    LABS:  during HD  EKG 2/17. Cipro vs meropenem depending on results  rheumatology

## 2020-02-18 LAB
ALBUMIN SERPL ELPH-MCNC: 2 G/DL — LOW (ref 3.3–5)
ALP SERPL-CCNC: 181 U/L — HIGH (ref 40–120)
ALT FLD-CCNC: 7 U/L — LOW (ref 10–45)
ANION GAP SERPL CALC-SCNC: 12 MMOL/L — SIGNIFICANT CHANGE UP (ref 5–17)
AST SERPL-CCNC: 34 U/L — SIGNIFICANT CHANGE UP (ref 10–40)
BILIRUB SERPL-MCNC: 1.3 MG/DL — HIGH (ref 0.2–1.2)
BUN SERPL-MCNC: 33 MG/DL — HIGH (ref 7–23)
CALCIUM SERPL-MCNC: 8.5 MG/DL — SIGNIFICANT CHANGE UP (ref 8.4–10.5)
CHLORIDE SERPL-SCNC: 98 MMOL/L — SIGNIFICANT CHANGE UP (ref 96–108)
CO2 SERPL-SCNC: 27 MMOL/L — SIGNIFICANT CHANGE UP (ref 22–31)
CREAT SERPL-MCNC: 6.64 MG/DL — HIGH (ref 0.5–1.3)
GLUCOSE SERPL-MCNC: 108 MG/DL — HIGH (ref 70–99)
HCT VFR BLD CALC: 26 % — LOW (ref 39–50)
HGB BLD-MCNC: 8.5 G/DL — LOW (ref 13–17)
MCHC RBC-ENTMCNC: 29.8 PG — SIGNIFICANT CHANGE UP (ref 27–34)
MCHC RBC-ENTMCNC: 32.7 GM/DL — SIGNIFICANT CHANGE UP (ref 32–36)
MCV RBC AUTO: 91.2 FL — SIGNIFICANT CHANGE UP (ref 80–100)
NRBC # BLD: 0 /100 WBCS — SIGNIFICANT CHANGE UP (ref 0–0)
PHOSPHATE SERPL-MCNC: 4 MG/DL — SIGNIFICANT CHANGE UP (ref 2.5–4.5)
PLATELET # BLD AUTO: 534 K/UL — HIGH (ref 150–400)
POTASSIUM SERPL-MCNC: 3.8 MMOL/L — SIGNIFICANT CHANGE UP (ref 3.5–5.3)
POTASSIUM SERPL-SCNC: 3.8 MMOL/L — SIGNIFICANT CHANGE UP (ref 3.5–5.3)
PROT SERPL-MCNC: 6.8 G/DL — SIGNIFICANT CHANGE UP (ref 6–8.3)
RBC # BLD: 2.85 M/UL — LOW (ref 4.2–5.8)
RBC # FLD: 15.8 % — HIGH (ref 10.3–14.5)
SODIUM SERPL-SCNC: 137 MMOL/L — SIGNIFICANT CHANGE UP (ref 135–145)
WBC # BLD: 15.06 K/UL — HIGH (ref 3.8–10.5)
WBC # FLD AUTO: 15.06 K/UL — HIGH (ref 3.8–10.5)

## 2020-02-18 PROCEDURE — 93010 ELECTROCARDIOGRAM REPORT: CPT

## 2020-02-18 PROCEDURE — 99232 SBSQ HOSP IP/OBS MODERATE 35: CPT

## 2020-02-18 RX ORDER — AMIKACIN SULFATE 250 MG/ML
500 INJECTION, SOLUTION INTRAMUSCULAR; INTRAVENOUS ONCE
Refills: 0 | Status: COMPLETED | OUTPATIENT
Start: 2020-02-18 | End: 2020-02-18

## 2020-02-18 RX ADMIN — Medication 1 TABLET(S): at 11:12

## 2020-02-18 RX ADMIN — LATANOPROST 1 DROP(S): 0.05 SOLUTION/ DROPS OPHTHALMIC; TOPICAL at 11:12

## 2020-02-18 RX ADMIN — BRIMONIDINE TARTRATE 1 DROP(S): 2 SOLUTION/ DROPS OPHTHALMIC at 06:05

## 2020-02-18 RX ADMIN — OXYCODONE HYDROCHLORIDE 10 MILLIGRAM(S): 5 TABLET ORAL at 09:15

## 2020-02-18 RX ADMIN — Medication 100 MILLIGRAM(S): at 11:12

## 2020-02-18 RX ADMIN — BRIMONIDINE TARTRATE 1 DROP(S): 2 SOLUTION/ DROPS OPHTHALMIC at 11:13

## 2020-02-18 RX ADMIN — AMIKACIN SULFATE 102 MILLIGRAM(S): 250 INJECTION, SOLUTION INTRAMUSCULAR; INTRAVENOUS at 18:50

## 2020-02-18 RX ADMIN — Medication 500000 UNIT(S): at 07:39

## 2020-02-18 RX ADMIN — Medication 1 APPLICATION(S): at 21:46

## 2020-02-18 RX ADMIN — APIXABAN 5 MILLIGRAM(S): 2.5 TABLET, FILM COATED ORAL at 06:03

## 2020-02-18 RX ADMIN — OXYCODONE HYDROCHLORIDE 5 MILLIGRAM(S): 5 TABLET ORAL at 22:35

## 2020-02-18 RX ADMIN — Medication 250 MILLIGRAM(S): at 21:45

## 2020-02-18 RX ADMIN — Medication 1 APPLICATION(S): at 06:03

## 2020-02-18 RX ADMIN — Medication 500000 UNIT(S): at 21:45

## 2020-02-18 RX ADMIN — ERYTHROPOIETIN 10000 UNIT(S): 10000 INJECTION, SOLUTION INTRAVENOUS; SUBCUTANEOUS at 17:47

## 2020-02-18 RX ADMIN — BRIMONIDINE TARTRATE 1 DROP(S): 2 SOLUTION/ DROPS OPHTHALMIC at 21:45

## 2020-02-18 RX ADMIN — FAMOTIDINE 10 MILLIGRAM(S): 10 INJECTION INTRAVENOUS at 11:12

## 2020-02-18 RX ADMIN — OXYCODONE HYDROCHLORIDE 10 MILLIGRAM(S): 5 TABLET ORAL at 14:26

## 2020-02-18 RX ADMIN — OXYCODONE HYDROCHLORIDE 10 MILLIGRAM(S): 5 TABLET ORAL at 15:23

## 2020-02-18 RX ADMIN — Medication 250 MILLIGRAM(S): at 06:03

## 2020-02-18 RX ADMIN — Medication 500000 UNIT(S): at 11:12

## 2020-02-18 RX ADMIN — Medication 1 APPLICATION(S): at 11:14

## 2020-02-18 RX ADMIN — OXYCODONE HYDROCHLORIDE 10 MILLIGRAM(S): 5 TABLET ORAL at 08:53

## 2020-02-18 RX ADMIN — Medication 25 MILLIGRAM(S): at 21:45

## 2020-02-18 RX ADMIN — Medication 25 MILLIGRAM(S): at 06:03

## 2020-02-18 RX ADMIN — APIXABAN 5 MILLIGRAM(S): 2.5 TABLET, FILM COATED ORAL at 21:45

## 2020-02-18 RX ADMIN — OXYCODONE HYDROCHLORIDE 5 MILLIGRAM(S): 5 TABLET ORAL at 21:47

## 2020-02-18 NOTE — PROGRESS NOTE ADULT - SUBJECTIVE AND OBJECTIVE BOX
Patient is a 49y old  Male who presents with a chief complaint of Debility (2020 14:57)      HPI:  49M with PMHx of ESRD on HD ( via LUE AVF), HTN, Gout, Glaucoma, NET of pancreas (s/p robotic distal panc/splenectomy at [place]  by Dr. Young, followed by Dr. Raphael, [facility] Oncologist), RA with hand contractures who presented to [facility] on 19 from dialysis for a fever. Of note, patient was recently admitted and underwent DDRT on 19 (ureteral stent sutured to [place] - removed 12/15). His post op course c/b DGF requiring HD, thrombocytopenia, elevated LDH and Haptoglobulin. Schistocytes  on peripheral smear concerning for TMA. Envarsus held. Received Belatacept . Started on PLEX (, 12/15). Underwent renal bx on  c/w profound ATN.  Found to have much improvement to levels, likely related to Tacrolimus. He was discharged home on 19 with outpatient HD.     Patient was now sent to [facility] ED on 19 by dialysis center after he was found to be febrile to 102, he did not get HD this AM, and last full HD session was on . Upon arrival, pt was found to have the flu and positive urine culture with Enterobacter. ID was consulted and pt was started on Tamiflu and Meropenam. Transplant team and Nephrology was following patient for HD. Pt was taken to the OR on  for evacuation of perinephretic hematoma which was infected with enterobacter and biopsy of transplanted kidney. Biopsy revealed ATN. Pt was recommended to continue on Meropenam as per ID. Pt also had a CT guided pelvic fluid aspiration by IR on . Post procedure pt had significant hematuria with clots for which urology was consulted. Pt placed on CBI and hematuria resolved. Pt had multiple antibiotics and antifungals added as per ID recommendations to due to rising WBC. Pt was taken back to the OR on 1/10 for evacuation of RP hematoma removal of external iliac artery stent, bovine pericardial patch repair of ext iliac artery, and ligation of external iliac vein. Pt was emergently taken to the OR after IR for explantation of transplanted kidney and repair of bladder perforation by transplant surgery. Vascular surgery called intra-op and repaired the right external iliac artery was repaired with a bovine pericardial patch, repaired the right external iliac vein, performed a thrombectomy of the right lower extremity veins and had an IVC filter placed. He had a renal US and IR guided drain and pigtail placed on 1/15. He had CT chest/abdomen/pelvis done on , which demonstrated persistent collection and so the IR pigtail drain was repositioned on .     His course was notable on  pt was found to have RLE DVT involving ext iliac, common fem veins, s/p thromobectomy and IVC filter placement, currently on treatment. Hospital course was further complicated by cardiac arrest on  due to massive acute saddle pulmonary embolism with acute cor pulmonale and acute DVT of the brachial vein in RUE. Pt was started on Eliquis. His course was complicated on  with AVF infiltrated during HD with aterial extravasation/bleeding to the left upper arm and chest seen on CTa. On 2/3, he had an arteriography, which demonstrated a tiny pseudoaneurysm arising from a distal branch of the left deep brachial artery and so permacath was placed on . He was also noted to have newly diagnosed afib and cardiology was following for medication management. He was also noted to have thrombocytosis and hematalogy-oncology work up was done with justin for outpatient follow up. He was also noted to have hyponatremia, which remained stable.    Patient medically stabilized and transferred to [facility] 20 for IRF services. 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  Carpal tunnel syndrome  Brain cyst: had MRI recently- now gone  Anemia  Gastric ulcer: Long time ago  Rheumatoid arthritis  Renal transplant recipient  Breakdown (mechanical) of penile (implanted) prosthesis, sequela  End-stage renal disease (ESRD): PERMACATH PLACEMENT  Abscess of left buttock: s/p I &amp; D  AV fistula: placement left lower arm  S/P cystoscopy: stent placement &amp; removal for kidney stones, lithiotripsy  s/p Lt Carpal tunnel:       MEDICATIONS  (STANDING):  allopurinol 100 milliGRAM(s) Oral daily  apixaban 5 milliGRAM(s) Oral two times a day  AQUAPHOR (petrolatum Ointment) 1 Application(s) Topical three times a day  brimonidine 0.2% Ophthalmic Solution 1 Drop(s) Both EYES three times a day  epoetin trey Injectable 15390 Unit(s) IV Push <User Schedule>  famotidine    Tablet 10 milliGRAM(s) Oral daily  latanoprost 0.005% Ophthalmic Solution 1 Drop(s) Both EYES daily  meropenem  IVPB 500 milliGRAM(s) IV Intermittent every 24 hours  metoprolol tartrate 25 milliGRAM(s) Oral every 12 hours  Nephro-candace 1 Tablet(s) Oral daily  nystatin    Suspension 547509 Unit(s) Oral four times a day  polyethylene glycol 3350 17 Gram(s) Oral daily  saccharomyces boulardii 250 milliGRAM(s) Oral two times a day  senna 2 Tablet(s) Oral at bedtime    MEDICATIONS  (PRN):  acetaminophen   Tablet .. 975 milliGRAM(s) Oral every 6 hours PRN Mild Pain (1 - 3)  benzocaine 15 mG/menthol 3.6 mG (Sugar-Free) Lozenge 1 Lozenge Oral three times a day PRN Sore Throat  bisacodyl Suppository 10 milliGRAM(s) Rectal daily PRN Constipation  lactulose Syrup 10 Gram(s) Oral daily PRN constipation  oxyCODONE    IR 5 milliGRAM(s) Oral every 4 hours PRN Moderate Pain (4 - 6)  oxyCODONE    IR 10 milliGRAM(s) Oral every 4 hours PRN Severe Pain (7 - 10)      Allergies    coconut (Anaphylaxis)  morphine (Pruritus; Rash)    Intolerances          VITALS  49y  Vital Signs Last 24 Hrs  T(C): 36.7 (2020 07:39), Max: 36.7 (2020 07:39)  T(F): 98 (2020 07:39), Max: 98 (2020 07:39)  HR: 61 (2020 07:39) (57 - 61)  BP: 162/84 (2020 07:39) (151/78 - 162/84)  BP(mean): --  RR: 14 (2020 07:39) (14 - 14)  SpO2: 96% (2020 07:39) (96% - 98%)  Daily     Daily Weight in k.3 (2020 05:53)        RECENT LABS:                      CAPILLARY BLOOD GLUCOSE        Review of Systems:   · Additional ROS	Patient seen during therapy. Patient reports that he feels well. He has noticed that he is more fatigued and SOB when he is working with therapy, but feels at rest. Understands that he is deconditioned due to his prolonged hospital course. Reports that he was confused last night because he is still getting used to being in a new facility.   Denies chest pain, fevers or abdominal sx. +BM	    Physical Exam:   · Constitutional	detailed exam	  · Constitutional Comments	alert O x 3.	  · Respiratory	detailed exam	  · Respiratory Details	breathing comfortably on room air, no increased work of breathing	  · Respiratory Comments	fair effort cath site clean chest wall right, no swelilng or warmth no TTP	  · Cardiovascular	detailed exam	  · Cardiovascular Details	regular rate and rhythm	  · Cardiovascular Comments	warm, well perfused	  · Gastrointestinal	detailed exam	  · GI Normal	normal; soft; nontender	  · Extremities	detailed exam	  · Extremities Comments	BLE with ACE wraps in place good color distally, wiggle toes 1+ LE swelling RUE 1+ swelilng

## 2020-02-18 NOTE — PROGRESS NOTE ADULT - ASSESSMENT
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 Young, followed by Dr. Raphael, 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 on 12/27/19 from dialysis for a fever found to be flu positive treated with tamiflu and have enterobacter infected perinephretic hematoma being admitted here  with Gait Instability, ADL impairments and Functional impairments. His course was complicated by RP hematoma and bladder perforation s/p repair and evacuation on 1/10, saddle PE s/p IVC filter on 1/10, acute DVT of RUE brachial vein, LUE AVF bleeding left deep brachial artery psuedoaneurysm, and cardiac arrest.       #Debility due to prolonged and complicated hospital course  -continue Comprehensive Rehab Program of PT/OT  -restorative dining added due to difficulty with container opening/hand use  -neuropsychology for support due to anxiety    #DDRT: c/b DGF/ATN/perinephric infected hematoma/pseudoneurysm/graft nephrectomy   - S/P AVF cannulation for HD c/b infiltration causing extravasation/bleeding to the left upper arm and chest noted on CTA  - S/P IR LUE arteriography showing a tiny pseudoaneurysm arising from a distal branch of the left deep brachial artery s/p dissection of a tiny feeder artery that resulted in resolution of the pseudoaneurysm.  - S/P IR permacath placement with TTS HD  - Eliquis 5mg BID started 2/12 and hold Aspirin    - Unable to continue with ciprofloxacin for abx course due to prolonged Qtc. ID consulted and plans for one dose of amikacin 500mg after dialysis in order to complete abx course. If unable to get dose, would stop abx altogether per ID  - pain control: Tylenol, Oxycodone. Neurontin discontinued on 2/15 due to prolonged QTc  - Wound care consult  appreciated 2/14: Recommendation: for the 2 wounds of the left upper arm area and the right groin area, suggest clean gently daily with normal saline, pat dry, then apply strip of aquacel ag, add few drops of normal saline as needed for aquacel to adhere to wound, cover with 2 inch gauze and paper tape, change daily.  - Thigh high compression stocking/ACE wrap to bilateral LE    - ACE wrap LUE from hand to shoulder  - Nephrology consult appreciated    #Saddle PE/multiple DVT: RUE brachial DVT,  R distal brachia vein , acute DVT of R external iliac, common femoral, femoral veins, R soleal vein   - S/p IVC filter on 1/9  - Apixaban 5mg BID  -monitor H/H, cardiopulmonary status  -hospitalist consult    #New Afib/Vtach:  -Continue metoprolol  -Patient with prolonged QTC. Seen by hospitalist and cardiology 2/15:  ·	DC cipro and gabapentin  ·	serial ekgs  ·	consider reducing amiodarone dose to 100 mg daily after holding 5 days  -Will touch base with cardiology about medication management     #HTN:  -Continue metoprolol and hold on HD days per outside hospital    #ESRD: on HD  -Nephrology consult. contacted, on T/Th/Sat  -permacath care  -Epogen, nephrovite    #Glaucoma:  -Continue with drops    #Gout:  -Allopurinol    #RA:  -Continue with comprehensive rehab  -Continue with pain control as below  -rheumatology consult, appreciate recs    #Thrombocytosis:  -Monitor labs  -Hematology follow up as an outpatient    #Hyponatremia: Resolved  -Monitor labs    #Anemia:  -Continue with epogen  -Monitor labs    #Pain Mgmt   - Tylenol PRN, Oxycodone PRN. DC neurontin given QTc prolongation    #GI/Bowel Mgmt  -  Continent c/w  Senna,  Miralax  - Continue lactulose PRN    #/Bladder Mgmt -   HD    #FEN   - Diet - Regular + Thins, Renal Restrictions  - famotidine  -nutrition consult

## 2020-02-18 NOTE — PROGRESS NOTE ADULT - ASSESSMENT
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 His course was further complicated by being cardiac arrest in setting of saddle PE. 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 Additionally he was noted to have an acute DVT of the RUE brachial vein as well. He is currently admitted to [facility] for acute rehabilitation of his functional deficits.      # DVT/PE  - eliquis 5mg bid  - monitor BP closely   - s/p IVC filter  - ensure that patient is not straining with his BM given saddle embolus (see below)    # Cardiac Arrest  - PT/OT per primary team  - given torsades arrest would avoid QTc prolonging agents    # Atrial Fibrillation - rate controlled   - amiodarone 200mg qd  - metoprolol 25mg bid with hold parameters  - eliquis 5mg bid    # HTN  - metoprolol (hold on dialysis days)    # ESRD on HD (T-Th-Sat)  - renal diet  - permacath care  - epogen with HD  - cont. neprovit  - Nephrology conuslt    Perinephritic Abscess   - c/w cipro until 2/20/2020  - obtain ECG daily to monitor QTc while on amiodarone and cipro    # RA   - would consider rheumatology consultation in regards to necessary medications, patient was previously being treated with immunosuppresants with transplant but is now off them. Given that these medications did have some disease modifying activity, he is at risk of RA flare given they are no longer being administered.   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 His course was further complicated by being cardiac arrest in setting of saddle PE. 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 Additionally he was noted to have an acute DVT of the RUE brachial vein as well. He is currently admitted to [facility] for acute rehabilitation of his functional deficits.      # DVT/PE  - eliquis 5mg bid  - monitor BP closely   - s/p IVC filter  - ensure that patient is not straining with his BM given saddle embolus (see below)    # Cardiac Arrest  - PT/OT per primary team  - given torsades arrest would avoid QTc prolonging agents    # Atrial Fibrillation - rate controlled   - amiodarone 200mg qd  - metoprolol 25mg bid with hold parameters  - eliquis 5mg bid    # HTN  - metoprolol (hold on dialysis days)    # ESRD on HD (T-Th-Sat)  - renal diet  - permacath care  - epogen with HD  - cont. neprovit  - Nephrology conuslt    # Perinephritic Abscess    - cipro was dc/ed due to QTC prolongation   - ID consult appreciated: would give a dose of amikacin 500 mg after HD (2/18) via his permacath   if cannot get the amikacin with dialysis would just stop antibiotics altogether.     # RA   - would consider rheumatology consultation in regards to necessary medications, patient was previously being treated with immunosuppresants with transplant but is now off them. Given that these medications did have some disease modifying activity, he is at risk of RA flare given they are no longer being administered.   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 on 12/27/19 from dialysis for a fever found to be flu positive treated with tamiflu and have enterobacter infected perinephritic hematoma whose course was complicated by DVT and then RP hematoma and bladder perforation s/p repair and evacuation. His course was further complicated by being cardiac arrest in setting of saddle PE. 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 Additionally he was noted to have an acute DVT of the RUE brachial vein as well. He is currently admitted to [facility] for acute rehabilitation of his functional deficits.      # DVT/PE  - eliquis 5mg bid  - monitor BP closely   - s/p IVC filter  - ensure that patient is not straining with his BM given saddle embolus (see below)    # Cardiac Arrest  - PT/OT per primary team  - given torsades arrest would avoid QTc prolonging agents    # Atrial Fibrillation - rate controlled   amiodarone was dc/ed 2/14 due to QTC  - metoprolol 25mg bid with hold parameters  - eliquis 5mg bid    # HTN  - metoprolol (hold on dialysis days)    # ESRD on HD (T-Th-Sat)  - renal diet  - permacath care  - epogen with HD  - cont. neprovit  - Nephrology conuslt    # Perinephritic Abscess    - cipro was dc/ed due to QTC prolongation   - ID consult appreciated: would give a dose of amikacin 500 mg after HD (2/18) via his permacath   if cannot get the amikacin with dialysis would just stop antibiotics altogether.     # RA   - would consider rheumatology consultation in regards to necessary medications, patient was previously being treated with immunosuppresants with transplant but is now off them. Given that these medications did have some disease modifying activity, he is at risk of RA flare given they are no longer being administered.   Gout  - allopurinol    # LUE Hematoma  - continue to monitor closely given anticoagulation usage  - surgery recommendations appreciated    # Leukocytosis  - likely in setting of resolving infection  - continue to monitor    # Thrombocytosis  - likely reactive  - Hematology follow up as an outpatient    # Normocytic anemia - H/H stable   - likely multifactorial given surgeries   - also partially driven by renal disease  - continue epogen    # Glaucoma  -Continue with eye drops    # DVT prophylaxis: eliquis

## 2020-02-18 NOTE — PROGRESS NOTE ADULT - SUBJECTIVE AND OBJECTIVE BOX
Patient seen in follow up for ESRD. Seen at dialysis. Comfortable, not dyspneic.      MEDICATIONS  (STANDING):  allopurinol 100 milliGRAM(s) Oral daily  amiKACIN  IVPB 500 milliGRAM(s) IV Intermittent once  apixaban 5 milliGRAM(s) Oral two times a day  AQUAPHOR (petrolatum Ointment) 1 Application(s) Topical three times a day  brimonidine 0.2% Ophthalmic Solution 1 Drop(s) Both EYES three times a day  epoetin trey Injectable 55304 Unit(s) IV Push <User Schedule>  famotidine    Tablet 10 milliGRAM(s) Oral daily  latanoprost 0.005% Ophthalmic Solution 1 Drop(s) Both EYES daily  metoprolol tartrate 25 milliGRAM(s) Oral every 12 hours  Nephro-candace 1 Tablet(s) Oral daily  nystatin    Suspension 941021 Unit(s) Oral four times a day  polyethylene glycol 3350 17 Gram(s) Oral daily  saccharomyces boulardii 250 milliGRAM(s) Oral two times a day  senna 2 Tablet(s) Oral at bedtime    MEDICATIONS  (PRN):  acetaminophen   Tablet .. 975 milliGRAM(s) Oral every 6 hours PRN Mild Pain (1 - 3)  benzocaine 15 mG/menthol 3.6 mG (Sugar-Free) Lozenge 1 Lozenge Oral three times a day PRN Sore Throat  bisacodyl Suppository 10 milliGRAM(s) Rectal daily PRN Constipation  lactulose Syrup 10 Gram(s) Oral daily PRN constipation  oxyCODONE    IR 5 milliGRAM(s) Oral every 4 hours PRN Moderate Pain (4 - 6)  oxyCODONE    IR 10 milliGRAM(s) Oral every 4 hours PRN Severe Pain (7 - 10)    T(C): 36.7 (02-18-20 @ 07:39), Max: 36.7 (02-18-20 @ 07:39)  HR: 61 (02-18-20 @ 07:39) (57 - 64)  BP: 162/84 (02-18-20 @ 07:39) (151/78 - 162/84)  RR: 14 (02-18-20 @ 07:39) (14 - 14)  SpO2: 96% (02-18-20 @ 07:39) (96% - 98%)  Wt(kg): --  I&O's Detail      PHYSICAL EXAM:  AAO x3  Right I.J. HD cath dressed  Respiratory: decreased BS at bases  Cardiovascular: S1 S2  Gastrointestinal: soft NT ND +BS  Extremities:  LUE wrapped ; large anasarca    CBC Full  -  ( 18 Feb 2020 16:00 )  WBC Count : 15.06 K/uL  RBC Count : 2.85 M/uL  Hemoglobin : 8.5 g/dL  Hematocrit : 26.0 %  Platelet Count - Automated : 534 K/uL  Mean Cell Volume : 91.2 fl  Mean Cell Hemoglobin : 29.8 pg  Mean Cell Hemoglobin Concentration : 32.7 gm/dL  Auto Neutrophil # : x  Auto Lymphocyte # : x  Auto Monocyte # : x  Auto Eosinophil # : x  Auto Basophil # : x  Auto Neutrophil % : x  Auto Lymphocyte % : x  Auto Monocyte % : x  Auto Eosinophil % : x  Auto Basophil % : x    02-18    137  |  98  |  33<H>  ----------------------------<  108<H>  3.8   |  27  |  6.64<H>    Ca    8.5      18 Feb 2020 16:00  Phos  4.0     02-18    TPro  6.8  /  Alb  2.0<L>  /  TBili  1.3<H>  /  DBili  x   /  AST  34  /  ALT  7<L>  /  AlkPhos  181<H>  02-18        Sodium, Serum: 137 (02-18 @ 16:00)    Creatinine, Serum: 6.64 (02-18 @ 16:00)    Potassium, Serum: 3.8 (02-18 @ 16:00)    Hemoglobin: 8.5 (02-18 @ 16:00)

## 2020-02-18 NOTE — PROGRESS NOTE ADULT - ASSESSMENT
Hx ESRD on HD  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, DGF  S/p transplant kidney bx 12/13: ATN, focal TMA  S/p PLEX 12/13, 12/15; d/c-d 12/20 w/op HD 3 x wk  Re-adm 12/27 w/Flu A, infected carlos-nephric hematoma   S/p OR 1/1 for washout and repeat renal graft biopsy (ATN)  Dx w/RLE DVT  Developed gross hematuria  S/p OR 1/10 for infected carlos-nephric hematoma, hemorrhage involving pseudo-aneurysm of anastomosis of renal artery to external iliac vein,   s/p transplant nephrectomy, s/p IVCF   S/p PEA arrest 1/13, + PE, R retroperitoneal collection  S/p IR drainage of abd collection  RUE DVT, AC  Abx per ID  AVF infiltration 2/1 w/hematoma extending to L chest wall while on Eliquis, s/p PRBCs  Routine HD today  Epogen with H.D.  No I.V. access  Last dose of Amikacin 500 mg will be given at the end of dialysis session today  Will need UF session tomorrow to facilitate edema mobilization  Re-educated on fluid restriction  Labs with dialysis

## 2020-02-18 NOTE — PROGRESS NOTE ADULT - SUBJECTIVE AND OBJECTIVE BOX
Patient is a 49y old  Male who presents with a chief complaint of Debility (17 Feb 2020 14:57)      Patient seen and examined at bedside, no events overnight, in no acute distress.     ALLERGIES:  coconut (Anaphylaxis)  morphine (Pruritus; Rash)    MEDICATIONS:  amiKACIN  IVPB 500 milliGRAM(s) IV Intermittent once  AQUAPHOR (petrolatum Ointment) 1 Application(s) Topical three times a day  bisacodyl Suppository 10 milliGRAM(s) Rectal daily PRN  epoetin trey Injectable 76312 Unit(s) IV Push <User Schedule>  lactulose Syrup 10 Gram(s) Oral daily PRN  metoprolol tartrate 25 milliGRAM(s) Oral every 12 hours  saccharomyces boulardii 250 milliGRAM(s) Oral two times a day    Vital Signs Last 24 Hrs  T(C): 36.7 (18 Feb 2020 07:39), Max: 36.7 (18 Feb 2020 07:39)  T(F): 98 (18 Feb 2020 07:39), Max: 98 (18 Feb 2020 07:39)  HR: 61 (18 Feb 2020 07:39) (57 - 61)  BP: 162/84 (18 Feb 2020 07:39) (151/78 - 162/84)  BP(mean): --  RR: 14 (18 Feb 2020 07:39) (14 - 14)  SpO2: 96% (18 Feb 2020 07:39) (96% - 98%)  I&O's Summary        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  Carpal tunnel syndrome  Brain cyst: had MRI recently- now gone  Anemia  Gastric ulcer: Long time ago  Rheumatoid arthritis  Renal transplant recipient  Breakdown (mechanical) of penile (implanted) prosthesis, sequela  End-stage renal disease (ESRD): PERMACATH PLACEMENT  Abscess of left buttock: s/p I &amp; D  AV fistula: placement left lower arm  S/P cystoscopy: stent placement &amp; removal for kidney stones, lithiotripsy  s/p Lt Carpal tunnel: 2011      Home Medications:  amiodarone 200 mg oral tablet: 1 tab(s) orally once a day (14 Feb 2020 11:48)  apixaban 5 mg oral tablet: 1 tab(s) orally 2 times a day (14 Feb 2020 11:48)  ciprofloxacin 500 mg oral tablet: 1 tab(s) orally once a day (14 Feb 2020 14:55)  oxyCODONE 5 mg oral tablet: 1 tab(s) orally every 4 hours, As needed, Moderate Pain (4 - 6) (14 Feb 2020 11:48)  polyethylene glycol 3350 oral powder for reconstitution: 17 gram(s) orally once a day (14 Feb 2020 11:48)  senna oral tablet: 2 tab(s) orally once a day (at bedtime) (14 Feb 2020 11:48)      PHYSICAL EXAM:  General: NAD  ENT: MMM, no thrush  Neck: Supple, No JVD  Lungs: Clear to percussion bilaterally, non labored breathing  Cardio: RRR, S1/S2, No murmurs  Abdomen: Soft, Nontender, Nondistended; Bowel sounds present  Neuro: no focal deficits   Extremities: BLE with ACE wraps in place good color distally, wiggle toes  1+ LE swelling; RUE 1+ swelling     LABS: no new labs    01-13 [id number] 5.2  12-11 [id number] 5.0    Culture - Group A Streptococcus (collected 12 Feb 2020 23:18)  Source: .Throat  Final Report (14 Feb 2020 13:11):    No Streptococcus pyogenes (Group A) isolated    RADIOLOGY & ADDITIONAL TESTS: no new tests     Care Discussed with Consultants/Other Providers: PM&R

## 2020-02-19 PROCEDURE — 99233 SBSQ HOSP IP/OBS HIGH 50: CPT

## 2020-02-19 PROCEDURE — 99232 SBSQ HOSP IP/OBS MODERATE 35: CPT

## 2020-02-19 RX ADMIN — APIXABAN 5 MILLIGRAM(S): 2.5 TABLET, FILM COATED ORAL at 22:17

## 2020-02-19 RX ADMIN — Medication 25 MILLIGRAM(S): at 05:11

## 2020-02-19 RX ADMIN — OXYCODONE HYDROCHLORIDE 10 MILLIGRAM(S): 5 TABLET ORAL at 23:15

## 2020-02-19 RX ADMIN — BRIMONIDINE TARTRATE 1 DROP(S): 2 SOLUTION/ DROPS OPHTHALMIC at 22:18

## 2020-02-19 RX ADMIN — OXYCODONE HYDROCHLORIDE 5 MILLIGRAM(S): 5 TABLET ORAL at 12:15

## 2020-02-19 RX ADMIN — OXYCODONE HYDROCHLORIDE 10 MILLIGRAM(S): 5 TABLET ORAL at 22:29

## 2020-02-19 RX ADMIN — LATANOPROST 1 DROP(S): 0.05 SOLUTION/ DROPS OPHTHALMIC; TOPICAL at 22:19

## 2020-02-19 RX ADMIN — Medication 500000 UNIT(S): at 22:17

## 2020-02-19 RX ADMIN — FAMOTIDINE 10 MILLIGRAM(S): 10 INJECTION INTRAVENOUS at 11:32

## 2020-02-19 RX ADMIN — Medication 1 APPLICATION(S): at 22:21

## 2020-02-19 RX ADMIN — Medication 500000 UNIT(S): at 05:12

## 2020-02-19 RX ADMIN — OXYCODONE HYDROCHLORIDE 5 MILLIGRAM(S): 5 TABLET ORAL at 11:32

## 2020-02-19 RX ADMIN — Medication 250 MILLIGRAM(S): at 05:11

## 2020-02-19 RX ADMIN — Medication 500000 UNIT(S): at 11:32

## 2020-02-19 RX ADMIN — APIXABAN 5 MILLIGRAM(S): 2.5 TABLET, FILM COATED ORAL at 05:11

## 2020-02-19 RX ADMIN — Medication 1 TABLET(S): at 11:32

## 2020-02-19 RX ADMIN — Medication 250 MILLIGRAM(S): at 22:16

## 2020-02-19 RX ADMIN — Medication 100 MILLIGRAM(S): at 11:32

## 2020-02-19 RX ADMIN — Medication 25 MILLIGRAM(S): at 22:16

## 2020-02-19 RX ADMIN — Medication 1 APPLICATION(S): at 05:12

## 2020-02-19 RX ADMIN — BRIMONIDINE TARTRATE 1 DROP(S): 2 SOLUTION/ DROPS OPHTHALMIC at 14:30

## 2020-02-19 RX ADMIN — OXYCODONE HYDROCHLORIDE 10 MILLIGRAM(S): 5 TABLET ORAL at 07:58

## 2020-02-19 RX ADMIN — BRIMONIDINE TARTRATE 1 DROP(S): 2 SOLUTION/ DROPS OPHTHALMIC at 05:13

## 2020-02-19 RX ADMIN — POLYETHYLENE GLYCOL 3350 17 GRAM(S): 17 POWDER, FOR SOLUTION ORAL at 11:32

## 2020-02-19 NOTE — PROGRESS NOTE ADULT - SUBJECTIVE AND OBJECTIVE BOX
Patient seen in follow up for ESRD. Receiving UF session now. No new c/o. Tolerating 3000 ml fluid removal.    Glaucoma  Gout  HTN (hypertension)  ESRD (end stage renal disease) on dialysis  Contracture of finger joint  Carpal tunnel syndrome  Brain cyst  Anemia  Renal insufficiency  Gastric ulcer  Rheumatoid arthritis    MEDICATIONS  (STANDING):  allopurinol 100 milliGRAM(s) Oral daily  apixaban 5 milliGRAM(s) Oral two times a day  AQUAPHOR (petrolatum Ointment) 1 Application(s) Topical three times a day  brimonidine 0.2% Ophthalmic Solution 1 Drop(s) Both EYES three times a day  epoetin trey Injectable 18765 Unit(s) IV Push <User Schedule>  latanoprost 0.005% Ophthalmic Solution 1 Drop(s) Both EYES daily  metoprolol tartrate 25 milliGRAM(s) Oral every 12 hours  Nephro-candace 1 Tablet(s) Oral daily  nystatin    Suspension 962077 Unit(s) Oral four times a day  polyethylene glycol 3350 17 Gram(s) Oral daily  saccharomyces boulardii 250 milliGRAM(s) Oral two times a day  senna 2 Tablet(s) Oral at bedtime    MEDICATIONS  (PRN):  acetaminophen   Tablet .. 975 milliGRAM(s) Oral every 6 hours PRN Mild Pain (1 - 3)  benzocaine 15 mG/menthol 3.6 mG (Sugar-Free) Lozenge 1 Lozenge Oral three times a day PRN Sore Throat  bisacodyl Suppository 10 milliGRAM(s) Rectal daily PRN Constipation  lactulose Syrup 10 Gram(s) Oral daily PRN constipation  oxyCODONE    IR 5 milliGRAM(s) Oral every 4 hours PRN Moderate Pain (4 - 6)  oxyCODONE    IR 10 milliGRAM(s) Oral every 4 hours PRN Severe Pain (7 - 10)    T(C): 36.8 (02-19-20 @ 09:04), Max: 36.8 (02-19-20 @ 09:04)  HR: 55 (02-19-20 @ 09:04) (55 - 62)  BP: 126/77 (02-19-20 @ 09:04) (126/77 - 162/84)  RR: 16 (02-19-20 @ 09:04) (14 - 16)  SpO2: 99% (02-19-20 @ 09:04) (96% - 100%)  Wt(kg): --  I&O's Detail    18 Feb 2020 07:01  -  19 Feb 2020 07:00  --------------------------------------------------------  IN:  Total IN: 0 mL    OUT:    Other: 2000 mL  Total OUT: 2000 mL    Total NET: -2000 mL          PHYSICAL EXAM:  AAO x3  Right I.J. HD cath dressed  Respiratory: decreased BS at bases  Cardiovascular: S1 S2  Gastrointestinal: soft NT ND +BS  Extremities:  LUE wrapped ; large anasarca    CBC Full  -  ( 18 Feb 2020 16:00 )  WBC Count : 15.06 K/uL  RBC Count : 2.85 M/uL  Hemoglobin : 8.5 g/dL  Hematocrit : 26.0 %  Platelet Count - Automated : 534 K/uL  Mean Cell Volume : 91.2 fl  Mean Cell Hemoglobin : 29.8 pg  Mean Cell Hemoglobin Concentration : 32.7 gm/dL  Auto Neutrophil # : x  Auto Lymphocyte # : x  Auto Monocyte # : x  Auto Eosinophil # : x  Auto Basophil # : x  Auto Neutrophil % : x  Auto Lymphocyte % : x  Auto Monocyte % : x  Auto Eosinophil % : x  Auto Basophil % : x    02-18    137  |  98  |  33<H>  ----------------------------<  108<H>  3.8   |  27  |  6.64<H>    Ca    8.5      18 Feb 2020 16:00  Phos  4.0     02-18    TPro  6.8  /  Alb  2.0<L>  /  TBili  1.3<H>  /  DBili  x   /  AST  34  /  ALT  7<L>  /  AlkPhos  181<H>  02-18        Sodium, Serum: 137 (02-18 @ 16:00)    Creatinine, Serum: 6.64 (02-18 @ 16:00)    Potassium, Serum: 3.8 (02-18 @ 16:00)    Hemoglobin: 8.5 (02-18 @ 16:00)

## 2020-02-19 NOTE — PROGRESS NOTE ADULT - SUBJECTIVE AND OBJECTIVE BOX
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 Young, followed by Dr. Raphael, [facility] Oncologist), RA with hand contractures who presented to [facility] on 12/27/19 from dialysis for a fever. 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 His post op course c/b DGF requiring HD, thrombocytopenia, elevated LDH and Haptoglobulin. Schistocytes  on peripheral smear concerning for TMA. Envarsus held. Received Belatacept 12/13. Started on PLEX (12/12, 12/15). Underwent renal bx on 12/13 c/w profound ATN.  Found to have much improvement to levels, likely related to Tacrolimus. He was discharged home on 12/20/19 with outpatient HD.     Patient was now sent to [facility] ED on 12/17/19 by dialysis center after he was found to be febrile to 102, he did not get HD this AM, and last full HD session was on 12/26. Upon arrival, pt was found to have the flu and positive urine culture with Enterobacter. ID was consulted and pt was started on Tamiflu and Meropenam. Transplant team and Nephrology was following patient for HD. Pt was taken to the OR on 1/1 for evacuation of perinephretic hematoma which was infected with enterobacter and biopsy of transplanted kidney. Biopsy revealed ATN. Pt was recommended to continue on Meropenam as per ID. Pt also had a CT guided pelvic fluid aspiration by IR on 1/6. Post procedure pt had significant hematuria with clots for which urology was consulted. Pt placed on CBI and hematuria resolved. Pt had multiple antibiotics and antifungals added as per ID recommendations to due to rising WBC. Pt was taken back to the OR on 1/10 for evacuation of RP hematoma removal of external iliac artery stent, bovine pericardial patch repair of ext iliac artery, and ligation of external iliac vein. Pt was emergently taken to the OR after IR for explantation of transplanted kidney and repair of bladder perforation by transplant surgery. Vascular surgery called intra-op and repaired the right external iliac artery was repaired with a bovine pericardial patch, repaired the right external iliac vein, performed a thrombectomy of the right lower extremity veins and had an IVC filter placed. He had a renal US and IR guided drain and pigtail placed on 1/15. He had CT chest/abdomen/pelvis done on 1/21, which demonstrated persistent collection and so the IR pigtail drain was repositioned on 1/22.     His course was notable on 1/8 pt was found to have RLE DVT involving ext iliac, common fem veins, s/p thromobectomy and IVC filter placement, currently on treatment. Hospital course was further complicated by cardiac arrest on 1/13 due to massive acute saddle pulmonary embolism with acute cor pulmonale and acute DVT of the brachial vein in RUE. Pt was started on Eliquis. His course was complicated on 2/1 with AVF infiltrated during HD with aterial extravasation/bleeding to the left upper arm and chest seen on CTa. On 2/3, he had an arteriography, which demonstrated a tiny pseudoaneurysm arising from a distal branch of the left deep brachial artery and so permacath was placed on 2/11. He was also noted to have newly diagnosed afib and cardiology was following for medication management. He was also noted to have thrombocytosis and hematalogy-oncology work up was done with justin for outpatient follow up. He was also noted to have hyponatremia, which remained stable.    Patient medically stabilized and transferred to [facility] 2/14/20 for IRF services. (14 Feb 2020 12:48)    Subjective:   Patient seen and evaluated this morning. No acute events overnight. Participating well in therapy. Pain is well controlled. Denies chest pain, SOB, nausea, vomiting, constipation or diarrhea. Slept well last night.  QTc 550 today, denies any symptoms.     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  Carpal tunnel syndrome  Brain cyst: had MRI recently- now gone  Anemia  Gastric ulcer: Long time ago  Rheumatoid arthritis  Renal transplant recipient  Breakdown (mechanical) of penile (implanted) prosthesis, sequela  End-stage renal disease (ESRD): PERMACATH PLACEMENT  Abscess of left buttock: s/p I &amp; D  AV fistula: placement left lower arm  S/P cystoscopy: stent placement &amp; removal for kidney stones, lithiotripsy  s/p Lt Carpal tunnel: 2011      MEDICATIONS  (STANDING):  allopurinol 100 milliGRAM(s) Oral daily  apixaban 5 milliGRAM(s) Oral two times a day  AQUAPHOR (petrolatum Ointment) 1 Application(s) Topical three times a day  brimonidine 0.2% Ophthalmic Solution 1 Drop(s) Both EYES three times a day  epoetin trey Injectable 04983 Unit(s) IV Push <User Schedule>  famotidine    Tablet 10 milliGRAM(s) Oral daily  latanoprost 0.005% Ophthalmic Solution 1 Drop(s) Both EYES daily  meropenem  IVPB 500 milliGRAM(s) IV Intermittent every 24 hours  metoprolol tartrate 25 milliGRAM(s) Oral every 12 hours  Nephro-candace 1 Tablet(s) Oral daily  nystatin    Suspension 640946 Unit(s) Oral four times a day  polyethylene glycol 3350 17 Gram(s) Oral daily  saccharomyces boulardii 250 milliGRAM(s) Oral two times a day  senna 2 Tablet(s) Oral at bedtime    MEDICATIONS  (PRN):  acetaminophen   Tablet .. 975 milliGRAM(s) Oral every 6 hours PRN Mild Pain (1 - 3)  benzocaine 15 mG/menthol 3.6 mG (Sugar-Free) Lozenge 1 Lozenge Oral three times a day PRN Sore Throat  bisacodyl Suppository 10 milliGRAM(s) Rectal daily PRN Constipation  lactulose Syrup 10 Gram(s) Oral daily PRN constipation  oxyCODONE    IR 5 milliGRAM(s) Oral every 4 hours PRN Moderate Pain (4 - 6)  oxyCODONE    IR 10 milliGRAM(s) Oral every 4 hours PRN Severe Pain (7 - 10)      Allergies    coconut (Anaphylaxis)  morphine (Pruritus; Rash)    Intolerances          VITALS  49y   Vital Signs Last 24 Hrs  T(C): 36.8 (19 Feb 2020 09:04), Max: 36.8 (19 Feb 2020 09:04)  T(F): 98.3 (19 Feb 2020 09:04), Max: 98.3 (19 Feb 2020 09:04)  HR: 55 (19 Feb 2020 09:04) (55 - 62)  BP: 126/77 (19 Feb 2020 09:04) (126/77 - 156/79)  BP(mean): --  RR: 16 (19 Feb 2020 09:04) (16 - 16)  SpO2: 99% (19 Feb 2020 09:04) (97% - 100%)        RECENT LABS:                           8.5    15.06 )-----------( 534      ( 18 Feb 2020 16:00 )             26.0     02-18    137  |  98  |  33<H>  ----------------------------<  108<H>  3.8   |  27  |  6.64<H>    Ca    8.5      18 Feb 2020 16:00  Phos  4.0     02-18    TPro  6.8  /  Alb  2.0<L>  /  TBili  1.3<H>  /  DBili  x   /  AST  34  /  ALT  7<L>  /  AlkPhos  181<H>  02-18                CAPILLARY BLOOD GLUCOSE        Review of Systems:   · Additional ROS	Patient seen during therapy. Patient reports that he feels well. He has noticed that he is more fatigued and SOB when he is working with therapy, but feels at rest. Understands that he is deconditioned due to his prolonged hospital course. Reports that he was confused last night because he is still getting used to being in a new facility.   Denies chest pain, fevers or abdominal sx. +BM	    Physical Exam:   · Constitutional	detailed exam	  · Constitutional Comments	alert O x 3.	  · Respiratory	detailed exam	  · Respiratory Details	breathing comfortably on room air, no increased work of breathing	  · Respiratory Comments	fair effort cath site clean chest wall right, no swelilng or warmth no TTP	  · Cardiovascular	detailed exam	  · Cardiovascular Details	regular rate and rhythm	  · Cardiovascular Comments	warm, well perfused	  · Gastrointestinal	detailed exam	  · GI Normal	normal; soft; nontender	  · Extremities	detailed exam	  · Extremities Comments	BLE with ACE wraps in place good color distally, wiggle toes 1+ LE swelling RUE 1+ swelilng

## 2020-02-19 NOTE — PROGRESS NOTE ADULT - SUBJECTIVE AND OBJECTIVE BOX
Patient is a 49y old  Male who presents with a chief complaint of Debility (18 Feb 2020 17:16)      Patient seen and examined at bedside, no events overnight, in no acute distress.     ALLERGIES:  coconut (Anaphylaxis)  morphine (Pruritus; Rash)    MEDICATIONS:  AQUAPHOR (petrolatum Ointment) 1 Application(s) Topical three times a day  bisacodyl Suppository 10 milliGRAM(s) Rectal daily PRN  epoetin trey Injectable 58052 Unit(s) IV Push <User Schedule>  lactulose Syrup 10 Gram(s) Oral daily PRN  metoprolol tartrate 25 milliGRAM(s) Oral every 12 hours  saccharomyces boulardii 250 milliGRAM(s) Oral two times a day    Vital Signs Last 24 Hrs  T(C): 36.8 (19 Feb 2020 09:04), Max: 36.8 (19 Feb 2020 09:04)  T(F): 98.3 (19 Feb 2020 09:04), Max: 98.3 (19 Feb 2020 09:04)  HR: 55 (19 Feb 2020 09:04) (55 - 62)  BP: 126/77 (19 Feb 2020 09:04) (126/77 - 156/79)  BP(mean): --  RR: 16 (19 Feb 2020 09:04) (16 - 16)  SpO2: 99% (19 Feb 2020 09:04) (97% - 100%)  I&O's Summary    18 Feb 2020 07:01  -  19 Feb 2020 07:00  --------------------------------------------------------  IN: 0 mL / OUT: 2000 mL / NET: -2000 mL          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  Carpal tunnel syndrome  Brain cyst: had MRI recently- now gone  Anemia  Gastric ulcer: Long time ago  Rheumatoid arthritis  Renal transplant recipient  Breakdown (mechanical) of penile (implanted) prosthesis, sequela  End-stage renal disease (ESRD): PERMACATH PLACEMENT  Abscess of left buttock: s/p I &amp; D  AV fistula: placement left lower arm  S/P cystoscopy: stent placement &amp; removal for kidney stones, lithiotripsy  s/p Lt Carpal tunnel: 2011      Home Medications:  amiodarone 200 mg oral tablet: 1 tab(s) orally once a day (14 Feb 2020 11:48)  apixaban 5 mg oral tablet: 1 tab(s) orally 2 times a day (14 Feb 2020 11:48)  ciprofloxacin 500 mg oral tablet: 1 tab(s) orally once a day (14 Feb 2020 14:55)  oxyCODONE 5 mg oral tablet: 1 tab(s) orally every 4 hours, As needed, Moderate Pain (4 - 6) (14 Feb 2020 11:48)  polyethylene glycol 3350 oral powder for reconstitution: 17 gram(s) orally once a day (14 Feb 2020 11:48)  senna oral tablet: 2 tab(s) orally once a day (at bedtime) (14 Feb 2020 11:48)      PHYSICAL EXAM:  General: NAD  ENT: MMM, no thrush  Neck: Supple, No JVD  Lungs: Clear to percussion bilaterally, non labored breathing  Cardio: RRR, S1/S2, No murmurs  Abdomen: Soft, Nontender, Nondistended; Bowel sounds present  Neuro: no new deficits   Extremities: No clubbing, cyanosis, or edema    LABS:                        8.5    15.06 )-----------( 534      ( 18 Feb 2020 16:00 )             26.0     02-18    137  |  98  |  33  ----------------------------<  108  3.8   |  27  |  6.64    Ca    8.5      18 Feb 2020 16:00  Phos  4.0     02-18    TPro  6.8  /  Alb  2.0  /  TBili  1.3  /  DBili  x   /  AST  34  /  ALT  7   /  AlkPhos  181  02-18    01-13 [id number] 5.2  12-11 [id number] 5.0    Culture - Group A Streptococcus (collected 12 Feb 2020 23:18)  Source: .Throat  Final Report (14 Feb 2020 13:11):    No Streptococcus pyogenes (Group A) isolated      RADIOLOGY & ADDITIONAL TESTS: no new tests     Care Discussed with Consultants/Other Providers: PM&R

## 2020-02-19 NOTE — PROGRESS NOTE ADULT - ASSESSMENT
ESRD on HD  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, DGF  S/p transplant kidney bx 12/13: ATN, focal TMA  S/p PLEX 12/13, 12/15; d/c-d 12/20 w/op HD 3 x wk  Re-adm 12/27 w/Flu A, infected carlos-nephric hematoma   S/p OR 1/1 for washout and repeat renal graft biopsy (ATN)  Dx w/RLE DVT  Developed gross hematuria  S/p OR 1/10 for infected carlos-nephric hematoma, hemorrhage involving pseudo-aneurysm of anastomosis of renal artery to external iliac vein,   s/p transplant nephrectomy, s/p IVCF   S/p PEA arrest 1/13, + PE, R retroperitoneal collection  S/p IR drainage of abd collection  RUE DVT, AC  Abx per ID  AVF infiltration 2/1 w/hematoma extending to L chest wall while on Eliquis, s/p PRBCs  Had HD yesterday  UF today  HD tomorrow  Epogen with H.D.  Fluid restriction  Labs with dialysis

## 2020-02-19 NOTE — PROGRESS NOTE ADULT - ASSESSMENT
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 on 12/27/19 from dialysis for a fever found to be flu positive treated with tamiflu and have enterobacter infected perinephritic hematoma whose course was complicated by DVT and then RP hematoma and bladder perforation s/p repair and evacuation. His course was further complicated by being cardiac arrest in setting of saddle PE. 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 Additionally he was noted to have an acute DVT of the RUE brachial vein as well. He is currently admitted to [facility] for acute rehabilitation of his functional deficits.      # DVT/PE  - eliquis 5mg bid  - monitor BP closely   - s/p IVC filter  - ensure that patient is not straining with his BM given saddle embolus (see below)    # Cardiac Arrest  - PT/OT per primary team  - given torsades arrest would avoid QTc prolonging agents    # Atrial Fibrillation - rate controlled   - amiodarone 200mg qd  - metoprolol 25mg bid with hold parameters  - eliquis 5mg bid    # HTN  - metoprolol (hold on dialysis days)    # ESRD on HD (T-Th-Sat)  - renal diet  - permacath care  - epogen with HD  - cont. neprovit  - Nephrology conuslt    # Perinephritic Abscess    - cipro was dc/ed due to QTC prolongation   - ID consult appreciated: would give a dose of amikacin 500 mg after HD (2/18) via his permacath   if cannot get the amikacin with dialysis would just stop antibiotics altogether.     # RA   - would consider rheumatology consultation in regards to necessary medications, patient was previously being treated with immunosuppresants with transplant but is now off them. Given that these medications did have some disease modifying activity, he is at risk of RA flare given they are no longer being administered.   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 His course was further complicated by being cardiac arrest in setting of saddle PE. 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 Additionally he was noted to have an acute DVT of the RUE brachial vein as well. He is currently admitted to [facility] for acute rehabilitation of his functional deficits.      # DVT/PE  - eliquis 5mg bid  - monitor BP closely   - s/p IVC filter  - ensure that patient is not straining with his BM given saddle embolus (see below)    # Cardiac Arrest  - PT/OT per primary team  - given torsades arrest would avoid QTc prolonging agents    # Atrial Fibrillation - rate controlled   - amiodarone was dced due to QTC  - metoprolol 25mg bid with hold parameters  - eliquis 5mg bid    # QT prolongation suspect medication induced  OQ=928 (EKG 2/17) from 554 (EKG 2/15)  pt. with recent use of antiarrhythmic and fluoroquinolones,  currently on famotidine ([id number])  and oxycodone - all 4 meds   with known side effect of QTC  amiodarone d/ezra 2/14, cipro d/ezra 2/15  will d/c famotidine today abd maintain current prn dose of oxycodone for now  will recheck EKG in AM and will consider adjusting pain management   if QT interval shows no improvement     # HTN  - metoprolol (hold on dialysis days)    # ESRD on HD (T-Th-Sat)  - renal diet  - permacath care  - epogen with HD  - cont. neprovit  - Nephrology conuslt    # Perinephritic Abscess    - cipro was dc/ed due to QTC prolongation   - ID consult appreciated: would give a dose of amikacin 500 mg after HD (2/18) via his permacath   if cannot get the amikacin with dialysis would just stop antibiotics altogether.     # RA   - would consider rheumatology consultation in regards to necessary medications, patient was previously being treated with immunosuppresants with transplant but is now off them. Given that these medications did have some disease modifying activity, he is at risk of RA flare given they are no longer being administered.   Gout  - allopurinol    # LUE Hematoma  - continue to monitor closely given anticoagulation usage  - surgery recommendations appreciated    # Leukocytosis  - likely in setting of resolving infection  - continue to monitor    # Thrombocytosis  - likely reactive  - Hematology follow up as an outpatient    # Normocytic anemia - H/H stable   - likely multifactorial given surgeries   - also partially driven by renal disease  - continue epogen    # Glaucoma  -Continue with eye drops    # DVT prophylaxis: eliquis

## 2020-02-20 LAB
ALBUMIN SERPL ELPH-MCNC: 2.1 G/DL — LOW (ref 3.3–5)
ALP SERPL-CCNC: 184 U/L — HIGH (ref 40–120)
ALT FLD-CCNC: 10 U/L — SIGNIFICANT CHANGE UP (ref 10–45)
ANION GAP SERPL CALC-SCNC: 9 MMOL/L — SIGNIFICANT CHANGE UP (ref 5–17)
AST SERPL-CCNC: 31 U/L — SIGNIFICANT CHANGE UP (ref 10–40)
BASOPHILS # BLD AUTO: 0.14 K/UL — SIGNIFICANT CHANGE UP (ref 0–0.2)
BASOPHILS NFR BLD AUTO: 1.1 % — SIGNIFICANT CHANGE UP (ref 0–2)
BILIRUB SERPL-MCNC: 1.5 MG/DL — HIGH (ref 0.2–1.2)
BUN SERPL-MCNC: 28 MG/DL — HIGH (ref 7–23)
CALCIUM SERPL-MCNC: 8.8 MG/DL — SIGNIFICANT CHANGE UP (ref 8.4–10.5)
CHLORIDE SERPL-SCNC: 98 MMOL/L — SIGNIFICANT CHANGE UP (ref 96–108)
CO2 SERPL-SCNC: 28 MMOL/L — SIGNIFICANT CHANGE UP (ref 22–31)
CREAT SERPL-MCNC: 6.1 MG/DL — HIGH (ref 0.5–1.3)
EOSINOPHIL # BLD AUTO: 0.87 K/UL — HIGH (ref 0–0.5)
EOSINOPHIL NFR BLD AUTO: 6.7 % — HIGH (ref 0–6)
GLUCOSE SERPL-MCNC: 107 MG/DL — HIGH (ref 70–99)
HCT VFR BLD CALC: 26.3 % — LOW (ref 39–50)
HGB BLD-MCNC: 8.5 G/DL — LOW (ref 13–17)
IMM GRANULOCYTES NFR BLD AUTO: 1.1 % — SIGNIFICANT CHANGE UP (ref 0–1.5)
LYMPHOCYTES # BLD AUTO: 1.6 K/UL — SIGNIFICANT CHANGE UP (ref 1–3.3)
LYMPHOCYTES # BLD AUTO: 12.2 % — LOW (ref 13–44)
MCHC RBC-ENTMCNC: 29.7 PG — SIGNIFICANT CHANGE UP (ref 27–34)
MCHC RBC-ENTMCNC: 32.3 GM/DL — SIGNIFICANT CHANGE UP (ref 32–36)
MCV RBC AUTO: 92 FL — SIGNIFICANT CHANGE UP (ref 80–100)
MONOCYTES # BLD AUTO: 1.77 K/UL — HIGH (ref 0–0.9)
MONOCYTES NFR BLD AUTO: 13.5 % — SIGNIFICANT CHANGE UP (ref 2–14)
NEUTROPHILS # BLD AUTO: 8.56 K/UL — HIGH (ref 1.8–7.4)
NEUTROPHILS NFR BLD AUTO: 65.4 % — SIGNIFICANT CHANGE UP (ref 43–77)
NRBC # BLD: 0 /100 WBCS — SIGNIFICANT CHANGE UP (ref 0–0)
PHOSPHATE SERPL-MCNC: 3.5 MG/DL — SIGNIFICANT CHANGE UP (ref 2.5–4.5)
PLATELET # BLD AUTO: 436 K/UL — HIGH (ref 150–400)
POTASSIUM SERPL-MCNC: 3.8 MMOL/L — SIGNIFICANT CHANGE UP (ref 3.5–5.3)
POTASSIUM SERPL-SCNC: 3.8 MMOL/L — SIGNIFICANT CHANGE UP (ref 3.5–5.3)
PROT SERPL-MCNC: 7 G/DL — SIGNIFICANT CHANGE UP (ref 6–8.3)
RBC # BLD: 2.86 M/UL — LOW (ref 4.2–5.8)
RBC # FLD: 16 % — HIGH (ref 10.3–14.5)
SODIUM SERPL-SCNC: 135 MMOL/L — SIGNIFICANT CHANGE UP (ref 135–145)
WBC # BLD: 13.08 K/UL — HIGH (ref 3.8–10.5)
WBC # FLD AUTO: 13.08 K/UL — HIGH (ref 3.8–10.5)

## 2020-02-20 PROCEDURE — 99232 SBSQ HOSP IP/OBS MODERATE 35: CPT | Mod: GC

## 2020-02-20 PROCEDURE — 93010 ELECTROCARDIOGRAM REPORT: CPT

## 2020-02-20 PROCEDURE — 99233 SBSQ HOSP IP/OBS HIGH 50: CPT

## 2020-02-20 RX ORDER — OXYCODONE HYDROCHLORIDE 5 MG/1
5 TABLET ORAL EVERY 4 HOURS
Refills: 0 | Status: DISCONTINUED | OUTPATIENT
Start: 2020-02-20 | End: 2020-02-27

## 2020-02-20 RX ORDER — OXYCODONE HYDROCHLORIDE 5 MG/1
10 TABLET ORAL EVERY 4 HOURS
Refills: 0 | Status: DISCONTINUED | OUTPATIENT
Start: 2020-02-20 | End: 2020-02-27

## 2020-02-20 RX ADMIN — Medication 250 MILLIGRAM(S): at 18:44

## 2020-02-20 RX ADMIN — OXYCODONE HYDROCHLORIDE 10 MILLIGRAM(S): 5 TABLET ORAL at 08:45

## 2020-02-20 RX ADMIN — BRIMONIDINE TARTRATE 1 DROP(S): 2 SOLUTION/ DROPS OPHTHALMIC at 06:38

## 2020-02-20 RX ADMIN — APIXABAN 5 MILLIGRAM(S): 2.5 TABLET, FILM COATED ORAL at 06:37

## 2020-02-20 RX ADMIN — LACTULOSE 10 GRAM(S): 10 SOLUTION ORAL at 19:43

## 2020-02-20 RX ADMIN — Medication 1 TABLET(S): at 12:45

## 2020-02-20 RX ADMIN — Medication 500000 UNIT(S): at 12:45

## 2020-02-20 RX ADMIN — OXYCODONE HYDROCHLORIDE 10 MILLIGRAM(S): 5 TABLET ORAL at 13:30

## 2020-02-20 RX ADMIN — Medication 500000 UNIT(S): at 18:44

## 2020-02-20 RX ADMIN — Medication 1 APPLICATION(S): at 21:36

## 2020-02-20 RX ADMIN — Medication 1 APPLICATION(S): at 06:38

## 2020-02-20 RX ADMIN — BRIMONIDINE TARTRATE 1 DROP(S): 2 SOLUTION/ DROPS OPHTHALMIC at 14:10

## 2020-02-20 RX ADMIN — OXYCODONE HYDROCHLORIDE 10 MILLIGRAM(S): 5 TABLET ORAL at 08:09

## 2020-02-20 RX ADMIN — OXYCODONE HYDROCHLORIDE 5 MILLIGRAM(S): 5 TABLET ORAL at 21:37

## 2020-02-20 RX ADMIN — OXYCODONE HYDROCHLORIDE 5 MILLIGRAM(S): 5 TABLET ORAL at 22:53

## 2020-02-20 RX ADMIN — Medication 250 MILLIGRAM(S): at 06:37

## 2020-02-20 RX ADMIN — ERYTHROPOIETIN 10000 UNIT(S): 10000 INJECTION, SOLUTION INTRAVENOUS; SUBCUTANEOUS at 16:43

## 2020-02-20 RX ADMIN — Medication 100 MILLIGRAM(S): at 12:45

## 2020-02-20 RX ADMIN — POLYETHYLENE GLYCOL 3350 17 GRAM(S): 17 POWDER, FOR SOLUTION ORAL at 12:45

## 2020-02-20 RX ADMIN — Medication 25 MILLIGRAM(S): at 18:44

## 2020-02-20 RX ADMIN — LATANOPROST 1 DROP(S): 0.05 SOLUTION/ DROPS OPHTHALMIC; TOPICAL at 21:35

## 2020-02-20 RX ADMIN — BRIMONIDINE TARTRATE 1 DROP(S): 2 SOLUTION/ DROPS OPHTHALMIC at 21:35

## 2020-02-20 RX ADMIN — APIXABAN 5 MILLIGRAM(S): 2.5 TABLET, FILM COATED ORAL at 18:44

## 2020-02-20 RX ADMIN — Medication 500000 UNIT(S): at 00:21

## 2020-02-20 RX ADMIN — OXYCODONE HYDROCHLORIDE 10 MILLIGRAM(S): 5 TABLET ORAL at 12:55

## 2020-02-20 RX ADMIN — Medication 1 APPLICATION(S): at 14:11

## 2020-02-20 RX ADMIN — Medication 500000 UNIT(S): at 06:39

## 2020-02-20 RX ADMIN — SENNA PLUS 2 TABLET(S): 8.6 TABLET ORAL at 21:35

## 2020-02-20 RX ADMIN — OXYCODONE HYDROCHLORIDE 10 MILLIGRAM(S): 5 TABLET ORAL at 08:30

## 2020-02-20 NOTE — DIETITIAN INITIAL EVALUATION ADULT. - SIGNS/SYMPTOMS
as evidenced by Pt reporting <50% po consumption as evidenced by Pt reporting <50% po consumption of meals during hospital course

## 2020-02-20 NOTE — DIETITIAN INITIAL EVALUATION ADULT. - OTHER INFO
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 Young, followed by Dr. Raphael, [facility] Oncologist), RA with hand contractures, DDRT 12/8/18 c/b DGF requiring who presented to [facility] on 12/27/19 from dialysis for a fever found to be flu positive treated with tamiflu and have enterobacter infected perinephretic hematoma being admitted here  with Gait Instability, ADL impairments and Functional impairments. His course was complicated by RP hematoma and bladder perforation s/p repair and evacuation on 1/10, saddle PE s/p IVC filter on 1/10, acute DVT of RUE brachial vein, LUE AVF bleeding left deep brachial artery psuedoaneurysm, and cardiac arrest.     Pt reports that he is allergic to coconut. Pt weight upon admission from [place] was 201 lbs ( 2/14), however was retaining much water. Pt states swelling is going down. Weight predialysis (2/19) was 188.4 lbs and postdialysis was 181.8 lbs. Patient is unsure of his usual weight. Pt states he is generally not eating much < 50% of meals, but ate > 90% of breakfast today. Pt reports that he is is just too tired to eat and generally has no appetite. 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 Young, followed by Dr. Raphael, [facility] Oncologist), RA with hand contractures, DDRT 12/8/18 c/b DGF requiring who presented to [facility] on 12/27/19 from dialysis for a fever found to be flu positive treated with tamiflu and have enterobacter infected perinephretic hematoma being admitted here  with Gait Instability, ADL impairments and Functional impairments. His course was complicated by RP hematoma and bladder perforation s/p repair and evacuation on 1/10, saddle PE s/p IVC filter on 1/10, acute DVT of RUE brachial vein, LUE AVF bleeding left deep brachial artery psuedoaneurysm, and cardiac arrest.     Pt reports that he is allergic to coconut. Pt weight upon admission from [place] was 201 lbs ( 2/14), however pt was retaining much water. Pt states swelling is going down. Pt had edema stage 2+ on left and right leg, left and right foot (2/20) and stage 2+ left arm, right arm (2/17). Weight predialysis (2/19) was 188.4 lbs and postdialysis was 181.8 lbs. Patient is unsure of his usual weight. Pt states he is generally not eating much < 50% of meals, but ate > 90% of breakfast today, which pt states is unusual. Pt reports that he is just too tired to eat and generally has no appetite. Pt states that he does not drink the nephro, and RN confirmed this. Pt refused extra servings of protein and Prosource, as he reports that he just doesn't eat much. Pt would like 4x dash seasoning packets at each meal. Discussed and provided pt with education on stage 5 renal diet. Pt is somewhat confused and will need reinforcement. Pt claims that he never followed any sort of diet before and reports that he was previously not familiar with a renal diet. 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 Young, followed by Dr. Raphael, [facility] Oncologist), RA with hand contractures, DDRT 12/8/18 c/b DGF requiring who presented to [facility] on 12/27/19 from dialysis for a fever found to be flu positive treated with tamiflu and have enterobacter infected perinephretic hematoma being admitted here  with Gait Instability, ADL impairments and Functional impairments. His course was complicated by RP hematoma and bladder perforation s/p repair and evacuation on 1/10, saddle PE s/p IVC filter on 1/10, acute DVT of RUE brachial vein, LUE AVF bleeding left deep brachial artery psuedoaneurysm, and cardiac arrest.     Pt reports that he is allergic to coconut. Pt weight upon admission from [place] was 201 lbs ( 2/14), however pt was retaining much water. Pt states swelling is going down. Pt had edema stage 2+ on left and right leg, left and right foot (2/20) and stage 2+ left arm, right arm (2/17). Weight predialysis (2/19) was 188.4 lbs and postdialysis was 181.8 lbs. Patient is unsure of his usual weight. Pt states he is generally not eating much < 50% of meals since the start of his hospital course, but ate > 90% of breakfast today, which pt states is unusual. Pt reports that he is just too tired to eat and generally has no appetite. Pt states that he does not drink the Nepro, and RN confirmed this. Pt declines extra servings of protein and Prosource, as he reports that he just doesn't eat much. Pt would like 4x dash seasoning packets at each meal. Discussed and provided pt with education on stage 5 renal diet. Pt is somewhat confused and will need reinforcement. Pt claims that he never followed any sort of diet before and reports that he was previously not familiar with a renal diet.

## 2020-02-20 NOTE — PROGRESS NOTE ADULT - ATTENDING COMMENTS
abnormal ekg  the ekg QTc is prolonged about 7% over baseline and remains over 500 which is prolonged. daily ekgs recommended. will titrate dose based upon tolerance and efficacy. abnormal ekg  the ekg QTc is prolonged about 7% over baseline and remains over 500 which is prolonged. daily EKGs recommended. will titrate dose based upon tolerance and efficacy. ill order lfts tsh and  x-ray to monitor for amiodarone toxicity.

## 2020-02-20 NOTE — PROGRESS NOTE ADULT - ASSESSMENT
ESRD on HD  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, DGF  S/p transplant kidney bx 12/13: ATN, focal TMA  S/p PLEX 12/13, 12/15; d/c-d 12/20 w/op HD 3 x wk  Re-adm 12/27 w/Flu A, infected carlos-nephric hematoma   S/p OR 1/1 for washout and repeat renal graft biopsy (ATN)  Dx w/RLE DVT  Developed gross hematuria  S/p OR 1/10 for infected carlos-nephric hematoma, hemorrhage involving pseudo-aneurysm of anastomosis of renal artery to external iliac vein,   s/p transplant nephrectomy, s/p IVCF   S/p PEA arrest 1/13, + PE, R retroperitoneal collection  S/p IR drainage of abd collection  RUE DVT, AC  Abx per ID  AVF infiltration 2/1 w/hematoma extending to L chest wall while on Eliquis, s/p PRBCs  Had UF yesterday  HD today  UF tomorrow  Epogen with H.D.  Fluid restriction  Labs with dialysis

## 2020-02-20 NOTE — PROGRESS NOTE ADULT - SUBJECTIVE AND OBJECTIVE BOX
Patient is a 49y old  Male who presents with a chief complaint of Debility (2020 17:31)      HPI:  49M with PMHx of ESRD on HD ( via LUE AVF), HTN, Gout, Glaucoma, NET of pancreas (s/p robotic distal panc/splenectomy at [place]  by Dr. Young, followed by Dr. Raphael, [facility] Oncologist), RA with hand contractures who presented to [facility] on 19 from dialysis for a fever. Of note, patient was recently admitted and underwent DDRT on 19 (ureteral stent sutured to [place] - removed 12/15). His post op course c/b DGF requiring HD, thrombocytopenia, elevated LDH and Haptoglobulin. Schistocytes  on peripheral smear concerning for TMA. Envarsus held. Received Belatacept . Started on PLEX (, 12/15). Underwent renal bx on  c/w profound ATN.  Found to have much improvement to levels, likely related to Tacrolimus. He was discharged home on 19 with outpatient HD.     Patient was now sent to [facility] ED on 19 by dialysis center after he was found to be febrile to 102, he did not get HD this AM, and last full HD session was on . Upon arrival, pt was found to have the flu and positive urine culture with Enterobacter. ID was consulted and pt was started on Tamiflu and Meropenam. Transplant team and Nephrology was following patient for HD. Pt was taken to the OR on  for evacuation of perinephretic hematoma which was infected with enterobacter and biopsy of transplanted kidney. Biopsy revealed ATN. Pt was recommended to continue on Meropenam as per ID. Pt also had a CT guided pelvic fluid aspiration by IR on . Post procedure pt had significant hematuria with clots for which urology was consulted. Pt placed on CBI and hematuria resolved. Pt had multiple antibiotics and antifungals added as per ID recommendations to due to rising WBC. Pt was taken back to the OR on 1/10 for evacuation of RP hematoma removal of external iliac artery stent, bovine pericardial patch repair of ext iliac artery, and ligation of external iliac vein. Pt was emergently taken to the OR after IR for explantation of transplanted kidney and repair of bladder perforation by transplant surgery. Vascular surgery called intra-op and repaired the right external iliac artery was repaired with a bovine pericardial patch, repaired the right external iliac vein, performed a thrombectomy of the right lower extremity veins and had an IVC filter placed. He had a renal US and IR guided drain and pigtail placed on 1/15. He had CT chest/abdomen/pelvis done on , which demonstrated persistent collection and so the IR pigtail drain was repositioned on .     His course was notable on  pt was found to have RLE DVT involving ext iliac, common fem veins, s/p thromobectomy and IVC filter placement, currently on treatment. Hospital course was further complicated by cardiac arrest on  due to massive acute saddle pulmonary embolism with acute cor pulmonale and acute DVT of the brachial vein in RUE. Pt was started on Eliquis. His course was complicated on  with AVF infiltrated during HD with aterial extravasation/bleeding to the left upper arm and chest seen on CTa. On 2/3, he had an arteriography, which demonstrated a tiny pseudoaneurysm arising from a distal branch of the left deep brachial artery and so permacath was placed on . He was also noted to have newly diagnosed afib and cardiology was following for medication management. He was also noted to have thrombocytosis and hematalogy-oncology work up was done with justin for outpatient follow up. He was also noted to have hyponatremia, which remained stable.    Patient medically stabilized and transferred to [facility] 20 for IRF services. 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  Carpal tunnel syndrome  Brain cyst: had MRI recently- now gone  Anemia  Gastric ulcer: Long time ago  Rheumatoid arthritis  Renal transplant recipient  Breakdown (mechanical) of penile (implanted) prosthesis, sequela  End-stage renal disease (ESRD): PERMACATH PLACEMENT  Abscess of left buttock: s/p I &amp; D  AV fistula: placement left lower arm  S/P cystoscopy: stent placement &amp; removal for kidney stones, lithiotripsy  s/p Lt Carpal tunnel:       MEDICATIONS  (STANDING):  allopurinol 100 milliGRAM(s) Oral daily  apixaban 5 milliGRAM(s) Oral two times a day  AQUAPHOR (petrolatum Ointment) 1 Application(s) Topical three times a day  brimonidine 0.2% Ophthalmic Solution 1 Drop(s) Both EYES three times a day  epoetin trey Injectable 49405 Unit(s) IV Push <User Schedule>  latanoprost 0.005% Ophthalmic Solution 1 Drop(s) Both EYES daily  metoprolol tartrate 25 milliGRAM(s) Oral every 12 hours  Nephro-candace 1 Tablet(s) Oral daily  nystatin    Suspension 118123 Unit(s) Oral four times a day  polyethylene glycol 3350 17 Gram(s) Oral daily  saccharomyces boulardii 250 milliGRAM(s) Oral two times a day  senna 2 Tablet(s) Oral at bedtime    MEDICATIONS  (PRN):  acetaminophen   Tablet .. 975 milliGRAM(s) Oral every 6 hours PRN Mild Pain (1 - 3)  benzocaine 15 mG/menthol 3.6 mG (Sugar-Free) Lozenge 1 Lozenge Oral three times a day PRN Sore Throat  bisacodyl Suppository 10 milliGRAM(s) Rectal daily PRN Constipation  lactulose Syrup 10 Gram(s) Oral daily PRN constipation  oxyCODONE    IR 5 milliGRAM(s) Oral every 4 hours PRN Moderate Pain (4 - 6)  oxyCODONE    IR 10 milliGRAM(s) Oral every 4 hours PRN Severe Pain (7 - 10)      Allergies    coconut (Anaphylaxis)  morphine (Pruritus; Rash)    Intolerances          VITALS  49y  Vital Signs Last 24 Hrs  T(C): 36.6 (2020 09:05), Max: 37.1 (2020 22:00)  T(F): 97.8 (2020 09:05), Max: 98.7 (2020 22:00)  HR: 57 (2020 09:05) (55 - 57)  BP: 161/85 (2020 09:05) (140/75 - 161/85)  BP(mean): --  RR: 14 (2020 09:05) (14 - 16)  SpO2: 97% (2020 09:05) (96% - 99%)  Daily     Daily Weight in k.5 (2020 19:10)        RECENT LABS:                          8.5    15.06 )-----------( 534      ( 2020 16:00 )             26.0     02-18    137  |  98  |  33<H>  ----------------------------<  108<H>  3.8   |  27  |  6.64<H>    Ca    8.5      2020 16:00  Phos  4.0         TPro  6.8  /  Alb  2.0<L>  /  TBili  1.3<H>  /  DBili  x   /  AST  34  /  ALT  7<L>  /  AlkPhos  181<H>      LIVER FUNCTIONS - ( 2020 16:00 )  Alb: 2.0 g/dL / Pro: 6.8 g/dL / ALK PHOS: 181 U/L / ALT: 7 U/L / AST: 34 U/L / GGT: x                   CAPILLARY BLOOD GLUCOSE          Review of Systems:   · Additional ROS	No acute events overnight. Patient reports that he is overall doing well and doing well with therapy. Reports that he could not sleep because of his neighbor. Denies chest pain, SOB or abdominal sx. Reports that his pain is well controlled. 	    Physical Exam:   · Constitutional	detailed exam	  · Constitutional Comments	alert O x 3.	  · Respiratory	detailed exam	  · Respiratory Details	breathing comfortably on room air, no increased work of breathing	  · Respiratory Comments	fair effort cath site clean chest wall right, no swelilng or warmth no TTP	  · Cardiovascular	detailed exam	  · Cardiovascular Details	regular rate and rhythm	  · Cardiovascular Comments	warm, well perfused	  · Gastrointestinal	detailed exam	  · GI Normal	normal; soft; nontender	  · Extremities	detailed exam	  · Extremities Comments	BLE with ACE wraps in place good color distally, wiggle toes 1+ LE swelling RUE 1+ swelilng

## 2020-02-20 NOTE — PROGRESS NOTE ADULT - ASSESSMENT
ASSESSMENT/PLAN  49M with PMHx of ESRD on HD ( via LUE AVF), HTN, Gout, Glaucoma, NET of pancreas (s/p robotic distal panc/splenectomy at [place] 2015 by Dr. Young, followed by Dr. Raphael, [facility] Oncologist), RA with hand contractures, DDRT 12/8/18 c/b DGF requiring who presented to [facility] on 12/27/19 from dialysis for a fever found to be flu positive treated with tamiflu and have enterobacter infected perinephretic hematoma being admitted here  with Gait Instability, ADL impairments and Functional impairments. His course was complicated by RP hematoma and bladder perforation s/p repair and evacuation on 1/10, saddle PE s/p IVC filter on 1/10, acute DVT of RUE brachial vein, LUE AVF bleeding left deep brachial artery psuedoaneurysm, and cardiac arrest.     #Debility due to prolonged and complicated hospital course  -continue Comprehensive Rehab Program of PT/OT  -restorative dining added due to difficulty with container opening/hand use  -neuropsychology for support due to anxiety    #DDRT: c/b DGF/ATN/perinephric infected hematoma/pseudoneurysm/graft nephrectomy   - S/P AVF cannulation for HD c/b infiltration causing extravasation/bleeding to the left upper arm and chest noted on CTA  - S/P IR LUE arteriography showing a tiny pseudoaneurysm arising from a distal branch of the left deep brachial artery s/p dissection of a tiny feeder artery that resulted in resolution of the pseudoaneurysm.  - S/P IR permacath placement with TTS HD  - Eliquis 5mg BID started 2/12 and hold Aspirin    - Unable to continue with ciprofloxacin for abx course due to prolonged Qtc. ID consulted and completed one dose of amikacin 500mg after dialysis on 2/18 in order to complete abx course.  - pain control: Tylenol, Oxycodone. Neurontin discontinued on 2/15 due to prolonged QTc  - Wound care consult  appreciated 2/14: Recommendation: for the 2 wounds of the left upper arm area and the right groin area, suggest clean gently daily with normal saline, pat dry, then apply strip of aquacel ag, add few drops of normal saline as needed for aquacel to adhere to wound, cover with 2 inch gauze and paper tape, change daily.  - Thigh high compression stocking/ACE wrap to bilateral LE    - ACE wrap LUE from hand to shoulder  - Nephrology consult appreciated    #Saddle PE/multiple DVT: RUE brachial DVT,  R distal brachia vein , acute DVT of R external iliac, common femoral, femoral veins, R soleal vein   - S/p IVC filter on 1/9  - Apixaban 5mg BID  -monitor H/H, cardiopulmonary status  -hospitalist consult    #New Afib/Vtach:  -Continue metoprolol  -Patient with prolonged QTC. Seen by hospitalist and cardiology 2/15:  ·	DC cipro and gabapentin  ·	serial ekgs  ·	consider reducing amiodarone dose to 100 mg daily after holding 5 days  -Will touch base with cardiology about further medication management     #HTN:  -Continue metoprolol and hold on HD days per outside hospital    #ESRD: on HD  -Nephrology consult. contacted, on T/Th/Sat  -permacath care  -Epogen, nephrovite    #Glaucoma:  -Continue with drops    #Gout:  -Allopurinol    #RA:  -Continue with comprehensive rehab  -Continue with pain control as below  -rheumatology consult, appreciate recs    #Thrombocytosis:  -Monitor labs  -Hematology follow up as an outpatient    #Hyponatremia: Resolved  -Monitor labs    #Anemia:  -Continue with epogen  -Monitor labs    #Pain Mgmt   - Tylenol PRN, Oxycodone PRN. DC neurontin given QTc prolongation    #GI/Bowel Mgmt  -  Continent c/w  Senna,  Miralax  - Continue lactulose PRN    #/Bladder Mgmt -   HD    #FEN   - Diet - Regular + Thins, Renal Restrictions  - famotidine  -nutrition consult    Dispo:  Plan for discharge 3/3/20 with goals of min to mod assist. ASSESSMENT/PLAN  49M with PMHx of ESRD on HD ( via LUE AVF), HTN, Gout, Glaucoma, NET of pancreas (s/p robotic distal panc/splenectomy at [place] 2015 by Dr. Young, followed by Dr. Raphael, [facility] Oncologist), RA with hand contractures, DDRT 12/8/18 c/b DGF requiring who presented to [facility] on 12/27/19 from dialysis for a fever found to be flu positive treated with tamiflu and have enterobacter infected perinephretic hematoma being admitted here  with Gait Instability, ADL impairments and Functional impairments. His course was complicated by RP hematoma and bladder perforation s/p repair and evacuation on 1/10, saddle PE s/p IVC filter on 1/10, acute DVT of RUE brachial vein, LUE AVF bleeding left deep brachial artery psuedoaneurysm, and cardiac arrest.     #Debility due to prolonged and complicated hospital course  -continue Comprehensive Rehab Program of PT/OT  -restorative dining added due to difficulty with container opening/hand use  -neuropsychology for support due to anxiety    #DDRT: c/b DGF/ATN/perinephric infected hematoma/pseudoneurysm/graft nephrectomy   - S/P AVF cannulation for HD c/b infiltration causing extravasation/bleeding to the left upper arm and chest noted on CTA  - S/P IR LUE arteriography showing a tiny pseudoaneurysm arising from a distal branch of the left deep brachial artery s/p dissection of a tiny feeder artery that resulted in resolution of the pseudoaneurysm.  - S/P IR permacath placement with TTS HD  - Eliquis 5mg BID started 2/12 and hold Aspirin    - Unable to continue with ciprofloxacin for abx course due to prolonged Qtc. ID consulted and completed one dose of amikacin 500mg after dialysis on 2/18 in order to complete abx course.  - pain control: Tylenol, Oxycodone. Neurontin discontinued on 2/15 due to prolonged QTc  - Wound care consult  appreciated 2/14: Recommendation: for the 2 wounds of the left upper arm area and the right groin area, suggest clean gently daily with normal saline, pat dry, then apply strip of aquacel ag, add few drops of normal saline as needed for aquacel to adhere to wound, cover with 2 inch gauze and paper tape, change daily.  - Thigh high compression stocking/ACE wrap to bilateral LE    - ACE wrap LUE from hand to shoulder  - Nephrology consult appreciated    #Saddle PE/multiple DVT: RUE brachial DVT,  R distal brachia vein , acute DVT of R external iliac, common femoral, femoral veins, R soleal vein   - S/p IVC filter on 1/9  - Apixaban 5mg BID  -monitor H/H, cardiopulmonary status  -hospitalist consult    #New Afib/Vtach:  -Continue metoprolol  -Patient with prolonged QTC. Seen by hospitalist and cardiology 2/15:  ·	DC cipro and gabapentin  ·	serial ekgs  ·	consider reducing amiodarone dose to 100 mg daily after holding 5 days  -Will touch base with cardiology about further medication management     #HTN:  -Continue metoprolol and hold on HD days per outside hospital    #ESRD: on HD  -Nephrology consult. contacted, on T/Th/Sat  -permacath care  -Epogen, nephrovite    #Glaucoma:  -Continue with drops    #Gout:  -Allopurinol    #RA:  -Continue with comprehensive rehab  -Continue with pain control as below  -rheumatology consult, appreciate recs    #Thrombocytosis:  -Monitor labs  -Hematology follow up as an outpatient    #Hyponatremia: Resolved  -Monitor labs    #Anemia:  -Continue with epogen  -Monitor labs    #Pain Mgmt   - Tylenol PRN, Oxycodone PRN. DC neurontin given QTc prolongation    #GI/Bowel Mgmt  -  Continent c/w  Senna,  Miralax  - Continue lactulose PRN    #/Bladder Mgmt -   HD    #FEN   - Diet - Regular + Thins, Renal Restrictions  - DC famotidine due to prolonged Qtc  -nutrition consult    Dispo:  Plan for discharge 3/3/20 with goals of min to mod assist.

## 2020-02-20 NOTE — PROGRESS NOTE ADULT - SUBJECTIVE AND OBJECTIVE BOX
Patient seen in follow up for ESRD. Seen at dialysis. No new c/o.    Glaucoma  Gout  HTN (hypertension)  ESRD (end stage renal disease) on dialysis  Contracture of finger joint  Carpal tunnel syndrome  Brain cyst  Anemia  Renal insufficiency  Gastric ulcer  Rheumatoid arthritis    MEDICATIONS  (STANDING):  allopurinol 100 milliGRAM(s) Oral daily  apixaban 5 milliGRAM(s) Oral two times a day  AQUAPHOR (petrolatum Ointment) 1 Application(s) Topical three times a day  brimonidine 0.2% Ophthalmic Solution 1 Drop(s) Both EYES three times a day  epoetin trey Injectable 24184 Unit(s) IV Push <User Schedule>  latanoprost 0.005% Ophthalmic Solution 1 Drop(s) Both EYES daily  metoprolol tartrate 25 milliGRAM(s) Oral every 12 hours  Nephro-candace 1 Tablet(s) Oral daily  nystatin    Suspension 355375 Unit(s) Oral four times a day  polyethylene glycol 3350 17 Gram(s) Oral daily  saccharomyces boulardii 250 milliGRAM(s) Oral two times a day  senna 2 Tablet(s) Oral at bedtime    MEDICATIONS  (PRN):  acetaminophen   Tablet .. 975 milliGRAM(s) Oral every 6 hours PRN Mild Pain (1 - 3)  benzocaine 15 mG/menthol 3.6 mG (Sugar-Free) Lozenge 1 Lozenge Oral three times a day PRN Sore Throat  bisacodyl Suppository 10 milliGRAM(s) Rectal daily PRN Constipation  lactulose Syrup 10 Gram(s) Oral daily PRN constipation  oxyCODONE    IR 5 milliGRAM(s) Oral every 4 hours PRN Moderate Pain (4 - 6)  oxyCODONE    IR 10 milliGRAM(s) Oral every 4 hours PRN Severe Pain (7 - 10)    T(C): 36.8 (02-20-20 @ 18:49), Max: 37.1 (02-19-20 @ 22:00)  HR: 64 (02-20-20 @ 18:49) (55 - 64)  BP: 145/73 (02-20-20 @ 18:49) (126/77 - 161/85)  RR: 14 (02-20-20 @ 18:49) (14 - 16)  SpO2: 100% (02-20-20 @ 18:49) (96% - 100%)  Wt(kg): --  I&O's Detail    19 Feb 2020 07:01  -  20 Feb 2020 07:00  --------------------------------------------------------  IN:  Total IN: 0 mL    OUT:    Other: 3000 mL  Total OUT: 3000 mL    Total NET: -3000 mL      20 Feb 2020 07:01  -  20 Feb 2020 18:55  --------------------------------------------------------  IN:    Oral Fluid: 360 mL  Total IN: 360 mL    OUT:  Total OUT: 0 mL    Total NET: 360 mL      PHYSICAL EXAM:  AAO x3  Right I.J. HD cath dressed  Respiratory: decreased BS at bases  Cardiovascular: S1 S2  Gastrointestinal: soft NT ND +BS  Extremities:  LUE wrapped, large anasarca      CBC Full  -  ( 20 Feb 2020 14:40 )  WBC Count : 13.08 K/uL  RBC Count : 2.86 M/uL  Hemoglobin : 8.5 g/dL  Hematocrit : 26.3 %  Platelet Count - Automated : 436 K/uL  Mean Cell Volume : 92.0 fl  Mean Cell Hemoglobin : 29.7 pg  Mean Cell Hemoglobin Concentration : 32.3 gm/dL  Auto Neutrophil # : 8.56 K/uL  Auto Lymphocyte # : 1.60 K/uL  Auto Monocyte # : 1.77 K/uL  Auto Eosinophil # : 0.87 K/uL  Auto Basophil # : 0.14 K/uL  Auto Neutrophil % : 65.4 %  Auto Lymphocyte % : 12.2 %  Auto Monocyte % : 13.5 %  Auto Eosinophil % : 6.7 %  Auto Basophil % : 1.1 %    02-20    135  |  98  |  28<H>  ----------------------------<  107<H>  3.8   |  28  |  6.10<H>    Ca    8.8      20 Feb 2020 14:40  Phos  3.5     02-20    TPro  7.0  /  Alb  2.1<L>  /  TBili  1.5<H>  /  DBili  x   /  AST  31  /  ALT  10  /  AlkPhos  184<H>  02-20        Sodium, Serum: 135 (02-20 @ 14:40)  Sodium, Serum: 137 (02-18 @ 16:00)    Creatinine, Serum: 6.10 (02-20 @ 14:40)  Creatinine, Serum: 6.64 (02-18 @ 16:00)    Potassium, Serum: 3.8 (02-20 @ 14:40)  Potassium, Serum: 3.8 (02-18 @ 16:00)    Hemoglobin: 8.5 (02-20 @ 14:40)  Hemoglobin: 8.5 (02-18 @ 16:00)

## 2020-02-20 NOTE — PROGRESS NOTE ADULT - SUBJECTIVE AND OBJECTIVE BOX
Patient is a 49y old  Male who presents with a chief complaint of Debility (20 Feb 2020 11:26)      Patient seen and examined at bedside, no events overnight, in no acute distress.   ALLERGIES:  coconut (Anaphylaxis)  morphine (Pruritus; Rash)    MEDICATIONS:  AQUAPHOR (petrolatum Ointment) 1 Application(s) Topical three times a day  bisacodyl Suppository 10 milliGRAM(s) Rectal daily PRN  epoetin trey Injectable 05598 Unit(s) IV Push <User Schedule>  lactulose Syrup 10 Gram(s) Oral daily PRN  metoprolol tartrate 25 milliGRAM(s) Oral every 12 hours  saccharomyces boulardii 250 milliGRAM(s) Oral two times a day    Vital Signs Last 24 Hrs  T(C): 36.6 (20 Feb 2020 09:05), Max: 37.1 (19 Feb 2020 22:00)  T(F): 97.8 (20 Feb 2020 09:05), Max: 98.7 (19 Feb 2020 22:00)  HR: 57 (20 Feb 2020 09:05) (55 - 57)  BP: 161/85 (20 Feb 2020 09:05) (140/75 - 161/85)  BP(mean): --  RR: 14 (20 Feb 2020 09:05) (14 - 16)  SpO2: 97% (20 Feb 2020 09:05) (96% - 99%)  I&O's Summary    19 Feb 2020 07:01  -  20 Feb 2020 07:00  --------------------------------------------------------  IN: 0 mL / OUT: 3000 mL / NET: -3000 mL          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  Carpal tunnel syndrome  Brain cyst: had MRI recently- now gone  Anemia  Gastric ulcer: Long time ago  Rheumatoid arthritis  Renal transplant recipient  Breakdown (mechanical) of penile (implanted) prosthesis, sequela  End-stage renal disease (ESRD): PERMACATH PLACEMENT  Abscess of left buttock: s/p I &amp; D  AV fistula: placement left lower arm  S/P cystoscopy: stent placement &amp; removal for kidney stones, lithiotripsy  s/p Lt Carpal tunnel: 2011      Home Medications:  amiodarone 200 mg oral tablet: 1 tab(s) orally once a day (14 Feb 2020 11:48)  apixaban 5 mg oral tablet: 1 tab(s) orally 2 times a day (14 Feb 2020 11:48)  ciprofloxacin 500 mg oral tablet: 1 tab(s) orally once a day (14 Feb 2020 14:55)  oxyCODONE 5 mg oral tablet: 1 tab(s) orally every 4 hours, As needed, Moderate Pain (4 - 6) (14 Feb 2020 11:48)  polyethylene glycol 3350 oral powder for reconstitution: 17 gram(s) orally once a day (14 Feb 2020 11:48)  senna oral tablet: 2 tab(s) orally once a day (at bedtime) (14 Feb 2020 11:48)      PHYSICAL EXAM:  General: NAD, A/O x3  ENT: MMM, no thrush  Neck: Supple, No JVD  Lungs: Clear to percussion bilaterally, non labored breathing  Cardio: RRR, S1/S2, No murmurs  Abdomen: Soft, Nontender, Nondistended; Bowel sounds present  Neuro: no new deficits   Extremities: No clubbing, cyanosis, or edema     LABS:                        8.5    15.06 )-----------( 534      ( 18 Feb 2020 16:00 )             26.0     02-18    137  |  98  |  33  ----------------------------<  108  3.8   |  27  |  6.64    Ca    8.5      18 Feb 2020 16:00  Phos  4.0     02-18    TPro  6.8  /  Alb  2.0  /  TBili  1.3  /  DBili  x   /  AST  34  /  ALT  7   /  AlkPhos  181  02-18    01-13 [id number] 5.2  12-11 [id number] 5.0    RADIOLOGY & ADDITIONAL TESTS: no new tests     Care Discussed with Consultants/Other Providers: PM&R

## 2020-02-20 NOTE — DIETITIAN INITIAL EVALUATION ADULT. - PHYSICAL APPEARANCE
overweight other (specify)/No overt signs of muscle/fat depletion/overweight Height: 5'11", Weight: (2/19) 181.8lbs, BMI: 25.4  Skin: no pressure ulcers, Edema: +2 bilateral legs/feet, +2 bilateral arms  IBW range: 155-189lbs   Last BM: 2/17 per pt

## 2020-02-20 NOTE — DIETITIAN INITIAL EVALUATION ADULT. - NS FNS WEIGHT USED FOR CALC
current current/postdialysis 181.8 lbs current/postdialysis 181.8 lbs; defer fluids to medical team (800ml fluid restriction)

## 2020-02-20 NOTE — DIETITIAN INITIAL EVALUATION ADULT. - NUTRITIONGOAL OUTCOME1
have pt increase meal consumption to >75% during hospital stay have pt increase meal consumption to >75% during rehab stay

## 2020-02-20 NOTE — DIETITIAN INITIAL EVALUATION ADULT. - PERTINENT MEDS FT
Eliquis, mystatin, tylenol, zyloprim, alphagan, xalatan, lopressor, nephrovite, oxycodone, miralax, florastor, senna, ducolax, Eliquis, mystatin, tylenol, zyloprim, alphagan, xalatan, lopressor, nephrovite, oxycodone, miralax, florastor, senna, ducolax, miralax, florastar, senna, ducolax,

## 2020-02-20 NOTE — PROGRESS NOTE ADULT - SUBJECTIVE AND OBJECTIVE BOX
CC: f/u for infected hematoma and vessel    Patient reports he is cold on dialysis    REVIEW OF SYSTEMS:  All other review of systems negative (Comprehensive ROS)    Antimicrobials Day #  :  nystatin    Suspension 052906 Unit(s) Oral four times a day    Other Medications Reviewed    T(F): 98.2 (02-20-20 @ 18:49), Max: 98.7 (02-19-20 @ 22:00)  HR: 64 (02-20-20 @ 18:49)  BP: 145/73 (02-20-20 @ 18:49)  RR: 14 (02-20-20 @ 18:49)  SpO2: 100% (02-20-20 @ 18:49)  Wt(kg): --    PHYSICAL EXAM:  General: alert, no acute distress  Eyes:  anicteric, no conjunctival injection, no discharge  Oropharynx: no lesions or injection 	  Neck: supple, without adenopathy  Lungs: clear to auscultation  Heart: regular rate and rhythm; no murmur, rubs or gallops  Abdomen: soft, nondistended, nontender, without mass or organomegaly, wound dressed  Skin: no lesions  Extremities: no clubbing, cyanosis,. left arm  edema. legs wrapped  Neurologic: alert, oriented, moves all extremities    LAB RESULTS:                        8.5    13.08 )-----------( 436      ( 20 Feb 2020 14:40 )             26.3     02-20    135  |  98  |  28<H>  ----------------------------<  107<H>  3.8   |  28  |  6.10<H>    Ca    8.8      20 Feb 2020 14:40  Phos  3.5     02-20    TPro  7.0  /  Alb  2.1<L>  /  TBili  1.5<H>  /  DBili  x   /  AST  31  /  ALT  10  /  AlkPhos  184<H>  02-20    LIVER FUNCTIONS - ( 20 Feb 2020 14:40 )  Alb: 2.1 g/dL / Pro: 7.0 g/dL / ALK PHOS: 184 U/L / ALT: 10 U/L / AST: 31 U/L / GGT: x             MICROBIOLOGY:  RECENT CULTURES:      RADIOLOGY REVIEWED:    < from: CT Angio Chest w/ IV Cont (02.01.20 @ 22:32) >  EXAM:  CT ANGIO UPR EXT (W)AW IC LT                          EXAM:  CT ANGIO CHEST (W)AW IC                            PROCEDURE DATE:  02/01/2020            INTERPRETATION:  CLINICAL INFORMATION: Status post IV infiltration during dialysis. Concern for AVF extravasation.    COMPARISON: CT chest 1/20/2020 and upper extremity ultrasound 2/1/2020.    PROCEDURE:   CT Angiography of the Chest and left upper extremity.  100 ml of Omnipaque 350 was injected intravenously.   Sagittal and coronal reformats were performed as well as 3D (MIP) reconstructions.      FINDINGS:    LUNGS AND AIRWAYS: Patent central airways.  Bibasilar atelectasis.    PLEURA: Small bilateral pleural effusion.    MEDIASTINUM AND EDUIN: No lymphadenopathy.    VESSELS: Redemonstrated bilateral subsegmental lower lobe pulmonary emboli. Atherosclerotic calcifications of the aorta and coronary arteries.    HEART: Heart size is normal. No pericardial effusion.    CHEST WALL AND LOWER NECK: Left thyroid lobe calcification.    VISUALIZED UPPER ABDOMEN: Anasarca. IVC filter. Cholelithiasis. Status post distal pancreatectomy.    BONES: Left hip ORIF. Unchanged sclerotic focus in the right iliac bone. Healed posterior left rib fractures. Right lower quadrant fluid collection atthe site of transplant nephrectomy with indwelling drainage catheter, now measuring 5.2 x 1.4 cm, decreased from prior, previously measuring 5.5 x 2.4 cm.    LEFT UPPER EXTREMITY:     Patient status post radial-cephalic AV fistula.    Large left upper extremity hematoma measuring 17.3 x 5.0 cm which extends into the left axilla with active arterial extravasation which persists on more delayed imaging (10, 250). Significant adjacent soft tissue infiltration.    Additional hematoma in the left anterior chest wall measuring 8 x 4.7 cm.    The outflow vein is patent however has aneurysmal dilatation measuring up to 1.2 cm.      IMPRESSION:     Large left upper extremity hematoma measuring 17.3 x 5.0 cm which extends into the left axilla with active arterial extravasation which persists on more delayed imaging.    These findings were discussed with Dr. May at 2/1/2020 10:58 PM by Dr. Hernandez with read back.    < end of copied text >            Assessment:  Patient with recent kidney transplant, post op microangiopathic hemolytic anemia, delayed graft function, ultimately developed infected graft, hematoma, vessel injury, required transplant kidney removal, vessel repair, had saddle PE, had infiltrated avf so permacath placed. he eventually came to rehab with plan to change meropenem to cipro upon transfer to finish what is left of the 42 days of the course of antibiotics. He has a very prolonged qtc confirmed by my own calculation and has no iv access aside from the permacath. He cannot get a pvl and a central line seems risky given his limited access, anticoagulation, need for ongoing dialysis so don't want to mess up his veins . When seen 3 says ago, he  needed  3 more days of antibiotics hence given a dose of amikacin with dialysis 2 days ago. No further antibiotics are needed at present. .       Plan:  monitor off antibiotics  call if further input is needed

## 2020-02-20 NOTE — PROGRESS NOTE ADULT - ASSESSMENT
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 His course was further complicated by being cardiac arrest in setting of saddle PE. 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 Additionally he was noted to have an acute DVT of the RUE brachial vein as well. He is currently admitted to [facility] for acute rehabilitation of his functional deficits.      # DVT/PE  - eliquis 5mg bid  - monitor BP closely   - s/p IVC filter  - ensure that patient is not straining with his BM given saddle embolus (see below)    # Cardiac Arrest  - PT/OT per primary team  - given torsades arrest would avoid QTc prolonging agents    # Atrial Fibrillation - rate controlled   - amiodarone was dced due to QTC  - metoprolol 25mg bid with hold parameters  - eliquis 5mg bid    # QT prolongation suspect medication induced  QTC trending down: RYB=457(2/15)->550(2/17)->542(2/18)->526(2/20  pt. with recent use of antiarrhythmic and fluoroquinolones,  famotidine ([id number])  and oxycodone - all 4 meds   with known side effect of QTC  amiodarone d/ezra 2/14, cipro d/ezra 2/15, famotidine d/ezra 2/19  will maintain current prn dose of oxycodone for now  will follow up with serial EKGs every other day   and will consider adjusting pain management   if QT interval increases     # HTN  - metoprolol (hold on dialysis days)    # ESRD on HD (T-Th-Sat)  - renal diet  - permacath care  - epogen with HD  - cont. neprovit  - Nephrology conuslt    # Perinephritic Abscess    - cipro was dc/ed due to QTC prolongation   - ID consult appreciated: would give a dose of amikacin 500 mg after HD (2/18) via his permacath   if cannot get the amikacin with dialysis would just stop antibiotics altogether.     # RA   - would consider rheumatology consultation in regards to necessary medications, patient was previously being treated with immunosuppresants with transplant but is now off them. Given that these medications did have some disease modifying activity, he is at risk of RA flare given they are no longer being administered.   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 on 12/27/19 from dialysis for a fever found to be flu positive treated with tamiflu and have enterobacter infected perinephritic hematoma whose course was complicated by DVT and then RP hematoma and bladder perforation s/p repair and evacuation. His course was further complicated by being cardiac arrest in setting of saddle PE. 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 Additionally he was noted to have an acute DVT of the RUE brachial vein as well. He is currently admitted to [facility] for acute rehabilitation of his functional deficits.      # DVT/PE  - eliquis 5mg bid  - monitor BP closely   - s/p IVC filter  - ensure that patient is not straining with his BM given saddle embolus (see below)    # Cardiac Arrest  - PT/OT per primary team  - given torsades arrest would avoid QTc prolonging agents    # Atrial Fibrillation - rate controlled   - amiodarone was dced due to QTC  - metoprolol 25mg bid with hold parameters  - eliquis 5mg bid    # QT prolongation suspect medication induced  QTC trending down: FLX=961(2/15)->550(2/17)->542(2/18)->526(2/20  pt. with recent use of antiarrhythmic and fluoroquinolones,  famotidine ([id number])  and oxycodone - all 4 meds   with known side effect of QTC  amiodarone d/ezra 2/14, cipro d/ezra 2/15, famotidine d/ezra 2/19  will maintain current prn dose of oxycodone for now  will follow up with serial EKGs daily  Reviewed cardiology's recs: the ekg QTc is prolonged about 7% over baseline and remains over 500 which is prolonged. daily EKGs recommended. will titrate dose based upon tolerance and efficacy. will order lfts tsh and  x-ray to monitor for amiodarone toxicity.    # HTN  - metoprolol (hold on dialysis days)    # ESRD on HD (T-Th-Sat)  - renal diet  - permacath care  - epogen with HD  - cont. neprovit  - Nephrology conuslt    # Perinephritic Abscess    - cipro was dc/ed due to QTC prolongation   - ID consult appreciated: would give a dose of amikacin 500 mg after HD (2/18) via his permacath   if cannot get the amikacin with dialysis would just stop antibiotics altogether.     # RA   - would consider rheumatology consultation in regards to necessary medications, patient was previously being treated with immunosuppresants with transplant but is now off them. Given that these medications did have some disease modifying activity, he is at risk of RA flare given they are no longer being administered.   Gout  - allopurinol    # LUE Hematoma  - continue to monitor closely given anticoagulation usage  - surgery recommendations appreciated    # Leukocytosis - trending down  - likely in setting of resolving infection  - continue to monitor    # Thrombocytosis  - likely reactive  - Hematology follow up as an outpatient    # Normocytic anemia - H/H stable   - likely multifactorial given surgeries   - also partially driven by renal disease  - continue epogen    # Glaucoma  -Continue with eye drops    # DVT prophylaxis: eliquis

## 2020-02-20 NOTE — PROGRESS NOTE ADULT - SUBJECTIVE AND OBJECTIVE BOX
Follow up for  SUBJ:    deconditioned patient is seen in rehab gym.     PMH  Erectile dysfunction  Glaucoma  Gout  HTN (hypertension)  ESRD (end stage renal disease) on dialysis  Contracture of finger joint  Carpal tunnel syndrome  Brain cyst  Anemia  Renal insufficiency  Gastric ulcer  Rheumatoid arthritis      MEDICATIONS  (STANDING):  allopurinol 100 milliGRAM(s) Oral daily  apixaban 5 milliGRAM(s) Oral two times a day  AQUAPHOR (petrolatum Ointment) 1 Application(s) Topical three times a day  brimonidine 0.2% Ophthalmic Solution 1 Drop(s) Both EYES three times a day  epoetin trey Injectable 90899 Unit(s) IV Push <User Schedule>  latanoprost 0.005% Ophthalmic Solution 1 Drop(s) Both EYES daily  metoprolol tartrate 25 milliGRAM(s) Oral every 12 hours  Nephro-candace 1 Tablet(s) Oral daily  nystatin    Suspension 827616 Unit(s) Oral four times a day  polyethylene glycol 3350 17 Gram(s) Oral daily  saccharomyces boulardii 250 milliGRAM(s) Oral two times a day  senna 2 Tablet(s) Oral at bedtime    MEDICATIONS  (PRN):  acetaminophen   Tablet .. 975 milliGRAM(s) Oral every 6 hours PRN Mild Pain (1 - 3)  benzocaine 15 mG/menthol 3.6 mG (Sugar-Free) Lozenge 1 Lozenge Oral three times a day PRN Sore Throat  bisacodyl Suppository 10 milliGRAM(s) Rectal daily PRN Constipation  lactulose Syrup 10 Gram(s) Oral daily PRN constipation  oxyCODONE    IR 5 milliGRAM(s) Oral every 4 hours PRN Moderate Pain (4 - 6)  oxyCODONE    IR 10 milliGRAM(s) Oral every 4 hours PRN Severe Pain (7 - 10)        PHYSICAL EXAM:  Vital Signs Last 24 Hrs  T(C): 36.6 (20 Feb 2020 09:05), Max: 37.1 (19 Feb 2020 22:00)  T(F): 97.8 (20 Feb 2020 09:05), Max: 98.7 (19 Feb 2020 22:00)  HR: 57 (20 Feb 2020 09:05) (55 - 57)  BP: 161/85 (20 Feb 2020 09:05) (140/75 - 161/85)  BP(mean): --  RR: 14 (20 Feb 2020 09:05) (14 - 16)  SpO2: 97% (20 Feb 2020 09:05) (96% - 99%)    GENERAL: NAD, chroniclaly ill  HEAD:  Atraumatic, Normocephalic  EYES: EOMI, PERRLA, conjunctiva and sclera clear  ENT: Moist mucous membranes, neck hemant disteniton.   NECK: Supple, No JVD, no bruits  CHEST/LUNG: Clear to percussion bilaterally; No rales, rhonchi, wheezing, or rubs  HEART: Regular rate and rhythm; No murmurs, rubs, or gallops PMI non displaced.  ABDOMEN: Soft, Nontender, Nondistended; Bowel sounds present  EXTREMITIES:  2+ Peripheral Pulses, No clubbing, cyanosis, or edema  SKIN: No rashes or lesions  NERVOUS SYSTEM:  Cranial Nerves II-XII intact      TELEMETRY:        ECG:    < from: 12 Lead ECG (02.20.20 @ 11:03) >  Diagnosis Line Sinus bradycardia  Twave abnormality, consider anterior ischemia  Prolonged QTc 526  Abnormal ECG  When compared with ECG of 18-FEB-2020 19:59,  the QT interval has improved however it remains markedly prolonged    Confirmed by LETICIA MUÑOZ SHAILESH ([id number]) on 2/20/2020 12:34:21 PM    < end of copied text >      qtc   8/7/12  423  1/29/20 477  2/1/20  492    ECHO:    < from: TTE Echo Complete w/Doppler (02.16.20 @ 11:30) >  Summary:   1. Left ventricular ejection fraction, by visual estimation, is 55 to 60%.   2. Normal global left ventricular systolic function.   3. Normal left ventricular size and wall thicknesses, with normal systolic and diastolic function.   4. Moderately enlarged right ventricle.   5. The left atrium is normal in size.   6. Mild thickening and calcification of the anterior and posterior mitral valve leaflets.   7. Mild-moderate tricuspid regurgitation.   8. Estimated pulmonary artery systolic pressure is 67.2 mmHg assuming a right atrial pressure of 10 mmHg, which is consistent with moderate pulmonary hypertension.    [id number] Tom Verma MD,[facility] , Electronically signed on 2/16/2020 at 12:45:21 PM         *** Final ***      TOM VERMA M.D., ATTENDING CARDIOLOGIST  This document has been electronically signed. Feb 16 2020 11:30AM      LABS:                        8.5    15.06 )-----------( 534      ( 18 Feb 2020 16:00 )             26.0     02-18    137  |  98  |  33<H>  ----------------------------<  108<H>  3.8   |  27  |  6.64<H>    Ca    8.5      18 Feb 2020 16:00  Phos  4.0     02-18    TPro  6.8  /  Alb  2.0<L>  /  TBili  1.3<H>  /  DBili  x   /  AST  34  /  ALT  7<L>  /  AlkPhos  181<H>  02-18            I&O's Summary    19 Feb 2020 07:01  -  20 Feb 2020 07:00  --------------------------------------------------------  IN: 0 mL / OUT: 3000 mL / NET: -3000 mL      BNP    RADIOLOGY & ADDITIONAL STUDIES:    < from: Xray Chest 1 View- PORTABLE-Routine (01.25.20 @ 07:16) >  EXAM:  XR CHEST PORTABLE ROUTINE 1V                            PROCEDURE DATE:  01/25/2020        INTERPRETATION:  XR CHEST. AP view.    INDICATION: Shortness of breath    COMPARISON: 1/24/2020    FINDINGS/  IMPRESSION:    Clear lungs. No pleural effusion or pneumothorax.    The cardiomediastinal silhouette is normal in size and contour. Old fracture deformity of the left clavicle.      DAYLIN HIGGINS M.D., ATTENDING RADIOLOGIST  This document has been electronically signed. Jan 25 2020 10:44AM  < end of copied text >

## 2020-02-21 LAB
T3 SERPL-MCNC: 66 NG/DL — LOW (ref 80–200)
T4 AB SER-ACNC: 8.9 UG/DL — SIGNIFICANT CHANGE UP (ref 4.6–12)
T4 FREE SERPL-MCNC: 1.5 NG/DL — SIGNIFICANT CHANGE UP (ref 0.9–1.8)
TSH SERPL-MCNC: 15.1 UIU/ML — HIGH (ref 0.27–4.2)

## 2020-02-21 PROCEDURE — 99232 SBSQ HOSP IP/OBS MODERATE 35: CPT | Mod: GC

## 2020-02-21 PROCEDURE — 99232 SBSQ HOSP IP/OBS MODERATE 35: CPT

## 2020-02-21 PROCEDURE — 93010 ELECTROCARDIOGRAM REPORT: CPT

## 2020-02-21 RX ORDER — SIMETHICONE 80 MG/1
80 TABLET, CHEWABLE ORAL THREE TIMES A DAY
Refills: 0 | Status: DISCONTINUED | OUTPATIENT
Start: 2020-02-21 | End: 2020-03-03

## 2020-02-21 RX ADMIN — Medication 500000 UNIT(S): at 06:05

## 2020-02-21 RX ADMIN — Medication 1 APPLICATION(S): at 06:06

## 2020-02-21 RX ADMIN — Medication 250 MILLIGRAM(S): at 06:06

## 2020-02-21 RX ADMIN — Medication 25 MILLIGRAM(S): at 19:39

## 2020-02-21 RX ADMIN — OXYCODONE HYDROCHLORIDE 10 MILLIGRAM(S): 5 TABLET ORAL at 13:00

## 2020-02-21 RX ADMIN — Medication 1 APPLICATION(S): at 19:39

## 2020-02-21 RX ADMIN — Medication 1 APPLICATION(S): at 21:42

## 2020-02-21 RX ADMIN — Medication 500000 UNIT(S): at 00:55

## 2020-02-21 RX ADMIN — BRIMONIDINE TARTRATE 1 DROP(S): 2 SOLUTION/ DROPS OPHTHALMIC at 21:42

## 2020-02-21 RX ADMIN — OXYCODONE HYDROCHLORIDE 10 MILLIGRAM(S): 5 TABLET ORAL at 22:27

## 2020-02-21 RX ADMIN — Medication 500000 UNIT(S): at 19:39

## 2020-02-21 RX ADMIN — BRIMONIDINE TARTRATE 1 DROP(S): 2 SOLUTION/ DROPS OPHTHALMIC at 06:05

## 2020-02-21 RX ADMIN — OXYCODONE HYDROCHLORIDE 10 MILLIGRAM(S): 5 TABLET ORAL at 21:43

## 2020-02-21 RX ADMIN — APIXABAN 5 MILLIGRAM(S): 2.5 TABLET, FILM COATED ORAL at 19:39

## 2020-02-21 RX ADMIN — LATANOPROST 1 DROP(S): 0.05 SOLUTION/ DROPS OPHTHALMIC; TOPICAL at 21:43

## 2020-02-21 RX ADMIN — Medication 500000 UNIT(S): at 12:11

## 2020-02-21 RX ADMIN — APIXABAN 5 MILLIGRAM(S): 2.5 TABLET, FILM COATED ORAL at 06:05

## 2020-02-21 RX ADMIN — OXYCODONE HYDROCHLORIDE 10 MILLIGRAM(S): 5 TABLET ORAL at 06:06

## 2020-02-21 RX ADMIN — Medication 1 TABLET(S): at 12:12

## 2020-02-21 RX ADMIN — OXYCODONE HYDROCHLORIDE 10 MILLIGRAM(S): 5 TABLET ORAL at 07:01

## 2020-02-21 RX ADMIN — OXYCODONE HYDROCHLORIDE 10 MILLIGRAM(S): 5 TABLET ORAL at 12:13

## 2020-02-21 RX ADMIN — Medication 100 MILLIGRAM(S): at 12:12

## 2020-02-21 RX ADMIN — Medication 250 MILLIGRAM(S): at 19:39

## 2020-02-21 RX ADMIN — BRIMONIDINE TARTRATE 1 DROP(S): 2 SOLUTION/ DROPS OPHTHALMIC at 13:38

## 2020-02-21 NOTE — PROGRESS NOTE ADULT - SUBJECTIVE AND OBJECTIVE BOX
Patient is a 49y old  Male who presents with a chief complaint of Debility (21 Feb 2020 11:06)    Patient seen and examined at bedside. Patient is tearful, he states "I am so exhausted," feeling too tired to participate in afternoon therapy. Denies other complaint.     ALLERGIES:  coconut (Anaphylaxis)  morphine (Pruritus; Rash)    MEDICATIONS  (STANDING):  allopurinol 100 milliGRAM(s) Oral daily  apixaban 5 milliGRAM(s) Oral two times a day  AQUAPHOR (petrolatum Ointment) 1 Application(s) Topical three times a day  brimonidine 0.2% Ophthalmic Solution 1 Drop(s) Both EYES three times a day  epoetin trey Injectable 20617 Unit(s) IV Push <User Schedule>  latanoprost 0.005% Ophthalmic Solution 1 Drop(s) Both EYES daily  metoprolol tartrate 25 milliGRAM(s) Oral every 12 hours  Nephro-candace 1 Tablet(s) Oral daily  nystatin    Suspension 263542 Unit(s) Oral four times a day  polyethylene glycol 3350 17 Gram(s) Oral daily  saccharomyces boulardii 250 milliGRAM(s) Oral two times a day  senna 2 Tablet(s) Oral at bedtime    MEDICATIONS  (PRN):  acetaminophen   Tablet .. 975 milliGRAM(s) Oral every 6 hours PRN Mild Pain (1 - 3)  benzocaine 15 mG/menthol 3.6 mG (Sugar-Free) Lozenge 1 Lozenge Oral three times a day PRN Sore Throat  bisacodyl Suppository 10 milliGRAM(s) Rectal daily PRN Constipation  lactulose Syrup 10 Gram(s) Oral daily PRN constipation  oxyCODONE    IR 5 milliGRAM(s) Oral every 4 hours PRN Moderate Pain (4 - 6)  oxyCODONE    IR 10 milliGRAM(s) Oral every 4 hours PRN Severe Pain (7 - 10)  simethicone 80 milliGRAM(s) Chew three times a day PRN Gas    Vital Signs Last 24 Hrs  T(F): 97.8 (21 Feb 2020 08:44), Max: 98.8 (20 Feb 2020 21:25)  HR: 57 (21 Feb 2020 08:44) (57 - 64)  BP: 149/85 (21 Feb 2020 08:44) (145/73 - 156/80)  RR: 16 (21 Feb 2020 08:44) (14 - 16)  SpO2: 100% (21 Feb 2020 08:44) (96% - 100%)    I&O's Summary  20 Feb 2020 07:01  -  21 Feb 2020 07:00  --------------------------------------------------------  IN: 360 mL / OUT: 2500 mL / NET: -2140 mL    21 Feb 2020 07:01  -  21 Feb 2020 13:41  --------------------------------------------------------  IN: 1360 mL / OUT: 0 mL / NET: 1360 mL    PHYSICAL EXAM:  GENERAL: NAD, ill appearing, tired   HEAD:  Atraumatic, Normocephalic  EYES: EOMI, conjunctiva and sclera clear  ENMT: Moist mucous membranes  NECK: Supple  CHEST/LUNG: Clear to auscultation bilaterally, good air entry, non-labored breathing  HEART: + S1/S2  ABDOMEN: Soft, Nontender, Nondistended  VASCULAR: Normal pulses, Normal capillary refill. Bilateral LE edema, ace wraps in place   EXTREMITIES: No calf tenderness, No cyanosis, No edema  SKIN: Warm, Intact  NERVOUS SYSTEM:  A/O x3, No new neuro deficits    LABS:                        8.5    13.08 )-----------( 436      ( 20 Feb 2020 14:40 )             26.3     02-20    135  |  98  |  28  ----------------------------<  107  3.8   |  28  |  6.10    Ca    8.8      20 Feb 2020 14:40  Phos  3.5     02-20    TPro  7.0  /  Alb  2.1  /  TBili  1.5  /  DBili  x   /  AST  31  /  ALT  10  /  AlkPhos  184  02-20    eGFR if Non African American: 10 mL/min/1.73M2 (02-20-20 @ 14:40)  eGFR if : 11 mL/min/1.73M2 (02-20-20 @ 14:40)      TSH 15.10   TSH with FT4 reflex --  Total T3 --      01-13 [id number] 5.2  12-11 [id number] 5.0      RADIOLOGY & ADDITIONAL TESTS:    Care Discussed with Consultants/Other Providers: yes

## 2020-02-21 NOTE — PROGRESS NOTE ADULT - ASSESSMENT
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 His course was further complicated by being cardiac arrest in setting of saddle PE. Additionally he was noted to have LUE AVF bleeding due to brachial artery pseudoaneurysm that underwent repair. Additionally he was noted to have an acute DVT of the RUE brachial vein as well. He is currently admitted to [facility] for acute rehabilitation of his functional deficits.      # DVT/PE  - eliquis 5mg bid  - monitor BP closely   - s/p IVC filter  - ensure that patient is not straining with his BM given saddle embolus (see below)    #Cardiac Arrest  - PT/OT per primary team  - given torsades arrest would avoid QTc prolonging agents    # Atrial Fibrillation - rate controlled   - amiodarone was dced due to QTC  - metoprolol 25mg bid with hold parameters  - eliquis 5mg bid    # QT prolongation suspect medication induced  - QTC trending down: CCC=376(2/15)->550(2/17)->542(2/18)->526(2/20)  - f/u QTC from today 2/21  - Recent use of antiarrhythmic and fluoroquinolones, famotidine ([id number]) and oxycodone - all 4 meds with known side effect of QTC  - Amiodarone d/ezra 2/14, cipro d/ezra 2/15, famotidine d/ezra 2/19  - Will maintain current prn dose of oxycodone for now  - Will follow up with serial EKGs daily  - Cardiology following    #Elevated TSH - true hypothyroid vs sick euthyroid  - TSH elevated --> 15.1  - F/u T3, fT4    # HTN  - metoprolol (hold on dialysis days)  - BP: 149/85 (21 Feb 2020 08:44) (145/73 - 156/80)    # ESRD on HD (normally T-Th-Sat)  - Nephrology following and dictating dialysis and ultrafiltration scheduling, patient has been taken for additional sessions this week  - renal diet  - permacath care  - epogen with HD  - cont. neprovite    # Perinephritic Abscess   - cipro was dc/ed due to QTC prolongation   - monitor off abx as per ID    # RA   - would consider rheumatology consultation in regards to necessary medications, patient was previously being treated with immunosuppressant with transplant but is now off them. Given that these medications did have some disease modifying activity, he is at risk of RA flare given they are no longer being administered.     #Gout  - allopurinol    # LUE Hematoma  - continue to monitor closely given anticoagulation usage  - surgery recommendations appreciated    # Leukocytosis - trending down  - likely in setting of resolving infection  - continue to monitor  - off abx as per ID    # Thrombocytosis  - likely reactive  - Hematology follow up as an outpatient    # Normocytic anemia - H/H stable   - likely multifactorial given surgeries   - also partially driven by renal disease  - continue epogen    # Glaucoma  -Continue with eye drops    # DVT prophylaxis:   - eliquis 49 year-old male with hx of ESRD on HD ( via permacath), HTN, Gout, Glaucoma, 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 His course was further complicated by being cardiac arrest in setting of saddle PE. Additionally he was noted to have LUE AVF bleeding due to brachial artery pseudoaneurysm that underwent repair. Additionally he was noted to have an acute DVT of the RUE brachial vein as well. He is currently admitted to [facility] for acute rehabilitation of his functional deficits.      # DVT/PE  - eliquis 5mg bid  - monitor BP closely   - s/p IVC filter  - ensure that patient is not straining with his BM given saddle embolus (see below)    #Cardiac Arrest  - PT/OT per primary team  - given torsades arrest would avoid QTc prolonging agents    # Atrial Fibrillation - rate controlled   - amiodarone was dced due to QTC  - metoprolol 25mg bid with hold parameters  - eliquis 5mg bid    # QT prolongation suspect medication induced  - QTC trending down: OVK=400(2/15)->550(2/17)->542(2/18)->526(2/20) --> 480 (2/21)  - Recent use of antiarrhythmic and fluoroquinolones, famotidine ([id number]) and oxycodone - all 4 meds with known side effect of QTC  - Amiodarone d/ezra 2/14, cipro d/ezra 2/15, famotidine d/ezra 2/19  - Will maintain current prn dose of oxycodone for now  - Will follow up with serial EKGs daily  - Cardiology following    #Elevated TSH - true hypothyroid vs sick euthyroid  - TSH elevated --> 15.1  - F/u T3, fT4    # HTN  - metoprolol (hold on dialysis days)  - BP: 149/85 (21 Feb 2020 08:44) (145/73 - 156/80)    # ESRD on HD (normally T-Th-Sat)  - Nephrology following and dictating dialysis and ultrafiltration scheduling, patient has been taken for additional sessions this week  - renal diet  - permacath care  - epogen with HD  - cont. neprovite    # Perinephritic Abscess   - cipro was dc/ed due to QTC prolongation   - monitor off abx as per ID    # RA   - would consider rheumatology consultation in regards to necessary medications, patient was previously being treated with immunosuppressant with transplant but is now off them. Given that these medications did have some disease modifying activity, he is at risk of RA flare given they are no longer being administered.     #Gout  - allopurinol    # LUE Hematoma  - continue to monitor closely given anticoagulation usage  - surgery recommendations appreciated    # Leukocytosis - trending down  - likely in setting of resolving infection  - continue to monitor  - off abx as per ID    # Thrombocytosis  - likely reactive  - Hematology follow up as an outpatient    # Normocytic anemia - H/H stable   - likely multifactorial given surgeries   - also partially driven by renal disease  - continue epogen    # Glaucoma  -Continue with eye drops    # DVT prophylaxis:   - eliquis

## 2020-02-21 NOTE — PROGRESS NOTE ADULT - ASSESSMENT
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, DGF  S/p transplant kidney bx 12/13: ATN, focal TMA  S/p PLEX 12/13, 12/15; d/c-d 12/20 w/op HD 3 x wk  Re-adm 12/27 w/Flu A, infected carlos-nephric hematoma   S/p OR 1/1 for washout and repeat renal graft biopsy (ATN)  Dx w/RLE DVT  Developed gross hematuria  S/p OR 1/10 for infected carlos-nephric hematoma, hemorrhage involving pseudo-aneurysm of anastomosis of renal artery to external iliac vein,   s/p transplant nephrectomy, s/p IVCF   S/p PEA arrest 1/13, + PE, R retroperitoneal collection  S/p IR drainage of abd collection  RUE DVT, AC  Off antibiotics per ID  AVF infiltration 2/1 w/hematoma extending to L chest wall while on Eliquis, s/p PRBCs  Had HD yesterday  UF today  HD tomorrow  Epogen with H.D.  Fluid restriction  Labs with dialysis

## 2020-02-21 NOTE — PROGRESS NOTE ADULT - SUBJECTIVE AND OBJECTIVE BOX
Patient seen in follow up for ESRD. No new c/o. Had UF session today, tolerated well. Seen by I.D. Off antibiotics.    Glaucoma  Gout  HTN (hypertension)  ESRD (end stage renal disease) on dialysis  Contracture of finger joint  Carpal tunnel syndrome  Brain cyst  Anemia  Gastric ulcer  Rheumatoid arthritis    MEDICATIONS  (STANDING):  allopurinol 100 milliGRAM(s) Oral daily  apixaban 5 milliGRAM(s) Oral two times a day  AQUAPHOR (petrolatum Ointment) 1 Application(s) Topical three times a day  brimonidine 0.2% Ophthalmic Solution 1 Drop(s) Both EYES three times a day  epoetin trey Injectable 89099 Unit(s) IV Push <User Schedule>  latanoprost 0.005% Ophthalmic Solution 1 Drop(s) Both EYES daily  metoprolol tartrate 25 milliGRAM(s) Oral every 12 hours  Nephro-candace 1 Tablet(s) Oral daily  nystatin    Suspension 175071 Unit(s) Oral four times a day  polyethylene glycol 3350 17 Gram(s) Oral daily  saccharomyces boulardii 250 milliGRAM(s) Oral two times a day  senna 2 Tablet(s) Oral at bedtime    MEDICATIONS  (PRN):  acetaminophen   Tablet .. 975 milliGRAM(s) Oral every 6 hours PRN Mild Pain (1 - 3)  benzocaine 15 mG/menthol 3.6 mG (Sugar-Free) Lozenge 1 Lozenge Oral three times a day PRN Sore Throat  bisacodyl Suppository 10 milliGRAM(s) Rectal daily PRN Constipation  lactulose Syrup 10 Gram(s) Oral daily PRN constipation  oxyCODONE    IR 5 milliGRAM(s) Oral every 4 hours PRN Moderate Pain (4 - 6)  oxyCODONE    IR 10 milliGRAM(s) Oral every 4 hours PRN Severe Pain (7 - 10)  simethicone 80 milliGRAM(s) Chew three times a day PRN Gas    T(C): 36.7 (02-21-20 @ 19:44), Max: 37.1 (02-20-20 @ 21:25)  HR: 62 (02-21-20 @ 19:44) (57 - 64)  BP: 144/82 (02-21-20 @ 19:44) (144/82 - 163/83)  RR: 14 (02-21-20 @ 19:44) (14 - 16)  SpO2: 100% (02-21-20 @ 19:44) (96% - 100%)  Wt(kg): --  I&O's Detail    20 Feb 2020 07:01  -  21 Feb 2020 07:00  --------------------------------------------------------  IN:    Oral Fluid: 360 mL  Total IN: 360 mL    OUT:    Other: 2500 mL  Total OUT: 2500 mL    Total NET: -2140 mL      21 Feb 2020 07:01  -  21 Feb 2020 20:39  --------------------------------------------------------  IN:    Oral Fluid: 480 mL  Total IN: 480 mL    OUT:  Total OUT: 0 mL    Total NET: 480 mL          PHYSICAL EXAM:  AAO x3  Right I.J. HD cath dressed  Respiratory: decreased BS at bases  Cardiovascular: S1 S2  Gastrointestinal: soft NT ND +BS  Extremities:  LUE wrapped, less anasarca       CBC Full  -  ( 20 Feb 2020 14:40 )  WBC Count : 13.08 K/uL  RBC Count : 2.86 M/uL  Hemoglobin : 8.5 g/dL  Hematocrit : 26.3 %  Platelet Count - Automated : 436 K/uL  Mean Cell Volume : 92.0 fl  Mean Cell Hemoglobin : 29.7 pg  Mean Cell Hemoglobin Concentration : 32.3 gm/dL  Auto Neutrophil # : 8.56 K/uL  Auto Lymphocyte # : 1.60 K/uL  Auto Monocyte # : 1.77 K/uL  Auto Eosinophil # : 0.87 K/uL  Auto Basophil # : 0.14 K/uL  Auto Neutrophil % : 65.4 %  Auto Lymphocyte % : 12.2 %  Auto Monocyte % : 13.5 %  Auto Eosinophil % : 6.7 %  Auto Basophil % : 1.1 %    02-20    135  |  98  |  28<H>  ----------------------------<  107<H>  3.8   |  28  |  6.10<H>    Ca    8.8      20 Feb 2020 14:40  Phos  3.5     02-20    TPro  7.0  /  Alb  2.1<L>  /  TBili  1.5<H>  /  DBili  x   /  AST  31  /  ALT  10  /  AlkPhos  184<H>  02-20        Sodium, Serum: 135 (02-20 @ 14:40)    Creatinine, Serum: 6.10 (02-20 @ 14:40)    Potassium, Serum: 3.8 (02-20 @ 14:40)    Hemoglobin: 8.5 (02-20 @ 14:40)

## 2020-02-21 NOTE — PROGRESS NOTE ADULT - ASSESSMENT
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 Young, followed by Dr. 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 His course was complicated by RP hematoma and bladder perforation s/p repair and evacuation on 1/10, saddle PE s/p IVC filter on 1/10, acute DVT of RUE brachial vein, LUE AVF bleeding left deep brachial artery psuedoaneurysm, and cardiac arrest.     #Debility due to prolonged and complicated hospital course  -continue Comprehensive Rehab Program of PT/OT  -restorative dining added due to difficulty with container opening/hand use  -neuropsychology for support due to anxiety    #DDRT: c/b DGF/ATN/perinephric infected hematoma/pseudoneurysm/graft nephrectomy   - S/P AVF cannulation for HD c/b infiltration causing extravasation/bleeding to the left upper arm and chest noted on CTA  - S/P IR LUE arteriography showing a tiny pseudoaneurysm arising from a distal branch of the left deep brachial artery s/p dissection of a tiny feeder artery that resulted in resolution of the pseudoaneurysm.  - S/P IR permacath placement with TTS HD  - Eliquis 5mg BID started 2/12 and hold Aspirin    - Unable to continue with ciprofloxacin for abx course due to prolonged Qtc. ID consulted and completed one dose of amikacin 500mg after dialysis on 2/18 in order to complete abx course. Per ID, no need for additional antibiotics.  - pain control: Tylenol, Oxycodone. Neurontin discontinued on 2/15 due to prolonged QTc  - Wound care consult  appreciated 2/14: Recommendation: for the 2 wounds of the left upper arm area and the right groin area, suggest clean gently daily with normal saline, pat dry, then apply strip of aquacel ag, add few drops of normal saline as needed for aquacel to adhere to wound, cover with 2 inch gauze and paper tape, change daily.  - Thigh high compression stocking/ACE wrap to bilateral LE    - ACE wrap LUE from hand to shoulder  - Nephrology consult appreciated    #Saddle PE/multiple DVT: RUE brachial DVT,  R distal brachia vein , acute DVT of R external iliac, common femoral, femoral veins, R soleal vein   - S/p IVC filter on 1/9  - Apixaban 5mg BID  -monitor H/H, cardiopulmonary status  -hospitalist consult    #New Afib/Vtach:  -Continue metoprolol  -Patient with prolonged QTC. Seen by hospitalist and cardiology 2/15:  ·	DC cipro and gabapentin  ·	serial ekgs  ·	consider reducing amiodarone dose to 100 mg daily after holding 5 days  -Cardiology consulted about further medication management    #Amiodarone toxicity: rule out per cardiology. LFTs stable. TSH 15  -Follow up CXR   -Discussed with hospitalist re: elevated TSH    #HTN:  -Continue metoprolol and hold on HD days per outside hospital    #ESRD: on HD  -Nephrology consult.on T/Th/Sat  -permacath care  -Epogen, nephrovite    #Glaucoma:  -Continue with drops    #Gout:  -Allopurinol    #RA:  -Continue with comprehensive rehab  -Continue with pain control as below  -rheumatology consult, appreciate recs    #Gas:  -Start simethicone prn    #Thrombocytosis:  -Monitor labs  -Hematology follow up as an outpatient    #Hyponatremia: Resolved  -Monitor labs    #Anemia:  -Continue with epogen  -Monitor labs    #Pain Mgmt   - Tylenol PRN, Oxycodone PRN. DC neurontin given QTc prolongation    #GI/Bowel Mgmt  -  Continent c/w  Senna,  Miralax  - Continue lactulose PRN    #/Bladder Mgmt -   HD    #FEN   - Diet - Regular + Thins, Renal Restrictions  - DC famotidine due to prolonged Qtc  -nutrition consult    Dispo:  Plan for discharge 3/3/20 with goals of min to mod assist.

## 2020-02-21 NOTE — PROGRESS NOTE ADULT - SUBJECTIVE AND OBJECTIVE BOX
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 Young, followed by Dr. Raphael, [facility] Oncologist), RA with hand contractures who presented to [facility] on 19 from dialysis for a fever. Of note, patient was recently admitted and underwent DDRT on 19 (ureteral stent sutured to [place] - removed 12/15). His post op course c/b DGF requiring HD, thrombocytopenia, elevated LDH and Haptoglobulin. Schistocytes  on peripheral smear concerning for TMA. Envarsus held. Received Belatacept . Started on PLEX (, 12/15). Underwent renal bx on  c/w profound ATN.  Found to have much improvement to levels, likely related to Tacrolimus. He was discharged home on 19 with outpatient HD.     Patient was now sent to [facility] ED on 19 by dialysis center after he was found to be febrile to 102, he did not get HD this AM, and last full HD session was on . Upon arrival, pt was found to have the flu and positive urine culture with Enterobacter. ID was consulted and pt was started on Tamiflu and Meropenam. Transplant team and Nephrology was following patient for HD. Pt was taken to the OR on  for evacuation of perinephretic hematoma which was infected with enterobacter and biopsy of transplanted kidney. Biopsy revealed ATN. Pt was recommended to continue on Meropenam as per ID. Pt also had a CT guided pelvic fluid aspiration by IR on . Post procedure pt had significant hematuria with clots for which urology was consulted. Pt placed on CBI and hematuria resolved. Pt had multiple antibiotics and antifungals added as per ID recommendations to due to rising WBC. Pt was taken back to the OR on 1/10 for evacuation of RP hematoma removal of external iliac artery stent, bovine pericardial patch repair of ext iliac artery, and ligation of external iliac vein. Pt was emergently taken to the OR after IR for explantation of transplanted kidney and repair of bladder perforation by transplant surgery. Vascular surgery called intra-op and repaired the right external iliac artery was repaired with a bovine pericardial patch, repaired the right external iliac vein, performed a thrombectomy of the right lower extremity veins and had an IVC filter placed. He had a renal US and IR guided drain and pigtail placed on 1/15. He had CT chest/abdomen/pelvis done on , which demonstrated persistent collection and so the IR pigtail drain was repositioned on .     His course was notable on  pt was found to have RLE DVT involving ext iliac, common fem veins, s/p thromobectomy and IVC filter placement, currently on treatment. Hospital course was further complicated by cardiac arrest on  due to massive acute saddle pulmonary embolism with acute cor pulmonale and acute DVT of the brachial vein in RUE. Pt was started on Eliquis. His course was complicated on  with AVF infiltrated during HD with aterial extravasation/bleeding to the left upper arm and chest seen on CTa. On 2/3, he had an arteriography, which demonstrated a tiny pseudoaneurysm arising from a distal branch of the left deep brachial artery and so permacath was placed on . He was also noted to have newly diagnosed afib and cardiology was following for medication management. He was also noted to have thrombocytosis and hematalogy-oncology work up was done with justin for outpatient follow up. He was also noted to have hyponatremia, which remained stable.    Patient medically stabilized and transferred to [facility] 20 for IRF services. 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  Carpal tunnel syndrome  Brain cyst: had MRI recently- now gone  Anemia  Gastric ulcer: Long time ago  Rheumatoid arthritis  Renal transplant recipient  Breakdown (mechanical) of penile (implanted) prosthesis, sequela  End-stage renal disease (ESRD): PERMACATH PLACEMENT  Abscess of left buttock: s/p I &amp; D  AV fistula: placement left lower arm  S/P cystoscopy: stent placement &amp; removal for kidney stones, lithiotripsy  s/p Lt Carpal tunnel:       MEDICATIONS  (STANDING):  allopurinol 100 milliGRAM(s) Oral daily  apixaban 5 milliGRAM(s) Oral two times a day  AQUAPHOR (petrolatum Ointment) 1 Application(s) Topical three times a day  brimonidine 0.2% Ophthalmic Solution 1 Drop(s) Both EYES three times a day  epoetin trey Injectable 06114 Unit(s) IV Push <User Schedule>  latanoprost 0.005% Ophthalmic Solution 1 Drop(s) Both EYES daily  metoprolol tartrate 25 milliGRAM(s) Oral every 12 hours  Nephro-candace 1 Tablet(s) Oral daily  nystatin    Suspension 197107 Unit(s) Oral four times a day  polyethylene glycol 3350 17 Gram(s) Oral daily  saccharomyces boulardii 250 milliGRAM(s) Oral two times a day  senna 2 Tablet(s) Oral at bedtime    MEDICATIONS  (PRN):  acetaminophen   Tablet .. 975 milliGRAM(s) Oral every 6 hours PRN Mild Pain (1 - 3)  benzocaine 15 mG/menthol 3.6 mG (Sugar-Free) Lozenge 1 Lozenge Oral three times a day PRN Sore Throat  bisacodyl Suppository 10 milliGRAM(s) Rectal daily PRN Constipation  lactulose Syrup 10 Gram(s) Oral daily PRN constipation  oxyCODONE    IR 5 milliGRAM(s) Oral every 4 hours PRN Moderate Pain (4 - 6)  oxyCODONE    IR 10 milliGRAM(s) Oral every 4 hours PRN Severe Pain (7 - 10)  simethicone 80 milliGRAM(s) Chew three times a day PRN Gas      Allergies    coconut (Anaphylaxis)  morphine (Pruritus; Rash)    Intolerances          VITALS  49y  Vital Signs Last 24 Hrs  T(C): 36.6 (2020 08:44), Max: 37.1 (2020 21:25)  T(F): 97.8 (2020 08:44), Max: 98.8 (2020 21:25)  HR: 57 (2020 08:44) (57 - 64)  BP: 149/85 (2020 08:44) (145/73 - 156/80)  BP(mean): --  RR: 16 (2020 08:44) (14 - 16)  SpO2: 100% (2020 08:44) (96% - 100%)  Daily     Daily Weight in k (2020 18:10)        RECENT LABS:                          8.5    13.08 )-----------( 436      ( 2020 14:40 )             26.3     02-20    135  |  98  |  28<H>  ----------------------------<  107<H>  3.8   |  28  |  6.10<H>    Ca    8.8      2020 14:40  Phos  3.5     02-    TPro  7.0  /  Alb  2.1<L>  /  TBili  1.5<H>  /  DBili  x   /  AST  31  /  ALT  10  /  AlkPhos  184<H>  20    LIVER FUNCTIONS - ( 2020 14:40 )  Alb: 2.1 g/dL / Pro: 7.0 g/dL / ALK PHOS: 184 U/L / ALT: 10 U/L / AST: 31 U/L / GGT: x                   CAPILLARY BLOOD GLUCOSE          Review of Systems:   · Additional ROS	No acute events overnight. Patient reports that he is overall doing well. Reports that he has some gas, denies abdominal pain. Denies nausea or vomiting. Denies chest pain or SOB. Feels like he is getting better each and every day. Reports that his swelling is improving. 	    Physical Exam:   · Constitutional	detailed exam	  · Constitutional Comments	alert O x 3.	  · Respiratory	detailed exam	  · Respiratory Details	breathing comfortably on room air, no increased work of breathing	  · Respiratory Comments	fair effort cath site clean chest wall right, no swelling or warmth no TTP	  · Cardiovascular	detailed exam	  · Cardiovascular Details	regular rate and rhythm	  · Cardiovascular Comments	warm, well perfused	  · Gastrointestinal	detailed exam	  · GI Normal	normal; soft; nontender	  · Extremities	detailed exam	  · Extremities Comments	BLE with ACE wraps in place good color distally, wiggle toes 1+ LE swelling RUE 1+ swelilng

## 2020-02-22 LAB
ALBUMIN SERPL ELPH-MCNC: 2.2 G/DL — LOW (ref 3.3–5)
ANION GAP SERPL CALC-SCNC: 11 MMOL/L — SIGNIFICANT CHANGE UP (ref 5–17)
BUN SERPL-MCNC: 29 MG/DL — HIGH (ref 7–23)
CALCIUM SERPL-MCNC: 9.1 MG/DL — SIGNIFICANT CHANGE UP (ref 8.4–10.5)
CHLORIDE SERPL-SCNC: 98 MMOL/L — SIGNIFICANT CHANGE UP (ref 96–108)
CO2 SERPL-SCNC: 29 MMOL/L — SIGNIFICANT CHANGE UP (ref 22–31)
CREAT SERPL-MCNC: 5.74 MG/DL — HIGH (ref 0.5–1.3)
CULTURE RESULTS: SIGNIFICANT CHANGE UP
GLUCOSE SERPL-MCNC: 99 MG/DL — SIGNIFICANT CHANGE UP (ref 70–99)
HCT VFR BLD CALC: 25.7 % — LOW (ref 39–50)
HGB BLD-MCNC: 8.4 G/DL — LOW (ref 13–17)
MCHC RBC-ENTMCNC: 30.2 PG — SIGNIFICANT CHANGE UP (ref 27–34)
MCHC RBC-ENTMCNC: 32.7 GM/DL — SIGNIFICANT CHANGE UP (ref 32–36)
MCV RBC AUTO: 92.4 FL — SIGNIFICANT CHANGE UP (ref 80–100)
NRBC # BLD: 0 /100 WBCS — SIGNIFICANT CHANGE UP (ref 0–0)
PHOSPHATE SERPL-MCNC: 4 MG/DL — SIGNIFICANT CHANGE UP (ref 2.5–4.5)
PLATELET # BLD AUTO: 427 K/UL — HIGH (ref 150–400)
POTASSIUM SERPL-MCNC: 4.1 MMOL/L — SIGNIFICANT CHANGE UP (ref 3.5–5.3)
POTASSIUM SERPL-SCNC: 4.1 MMOL/L — SIGNIFICANT CHANGE UP (ref 3.5–5.3)
RBC # BLD: 2.78 M/UL — LOW (ref 4.2–5.8)
RBC # FLD: 16.2 % — HIGH (ref 10.3–14.5)
SODIUM SERPL-SCNC: 138 MMOL/L — SIGNIFICANT CHANGE UP (ref 135–145)
SPECIMEN SOURCE: SIGNIFICANT CHANGE UP
WBC # BLD: 11.69 K/UL — HIGH (ref 3.8–10.5)
WBC # FLD AUTO: 11.69 K/UL — HIGH (ref 3.8–10.5)

## 2020-02-22 PROCEDURE — 93010 ELECTROCARDIOGRAM REPORT: CPT

## 2020-02-22 PROCEDURE — 71045 X-RAY EXAM CHEST 1 VIEW: CPT | Mod: 26

## 2020-02-22 PROCEDURE — 99232 SBSQ HOSP IP/OBS MODERATE 35: CPT | Mod: GC

## 2020-02-22 RX ADMIN — Medication 1 APPLICATION(S): at 06:43

## 2020-02-22 RX ADMIN — BRIMONIDINE TARTRATE 1 DROP(S): 2 SOLUTION/ DROPS OPHTHALMIC at 21:41

## 2020-02-22 RX ADMIN — Medication 250 MILLIGRAM(S): at 06:42

## 2020-02-22 RX ADMIN — Medication 1 TABLET(S): at 12:32

## 2020-02-22 RX ADMIN — Medication 500000 UNIT(S): at 06:43

## 2020-02-22 RX ADMIN — BRIMONIDINE TARTRATE 1 DROP(S): 2 SOLUTION/ DROPS OPHTHALMIC at 06:42

## 2020-02-22 RX ADMIN — OXYCODONE HYDROCHLORIDE 10 MILLIGRAM(S): 5 TABLET ORAL at 14:10

## 2020-02-22 RX ADMIN — OXYCODONE HYDROCHLORIDE 10 MILLIGRAM(S): 5 TABLET ORAL at 07:54

## 2020-02-22 RX ADMIN — OXYCODONE HYDROCHLORIDE 10 MILLIGRAM(S): 5 TABLET ORAL at 18:52

## 2020-02-22 RX ADMIN — Medication 500000 UNIT(S): at 00:27

## 2020-02-22 RX ADMIN — Medication 1 APPLICATION(S): at 14:21

## 2020-02-22 RX ADMIN — Medication 100 MILLIGRAM(S): at 12:32

## 2020-02-22 RX ADMIN — LATANOPROST 1 DROP(S): 0.05 SOLUTION/ DROPS OPHTHALMIC; TOPICAL at 11:15

## 2020-02-22 RX ADMIN — APIXABAN 5 MILLIGRAM(S): 2.5 TABLET, FILM COATED ORAL at 18:51

## 2020-02-22 RX ADMIN — OXYCODONE HYDROCHLORIDE 10 MILLIGRAM(S): 5 TABLET ORAL at 07:59

## 2020-02-22 RX ADMIN — Medication 1 APPLICATION(S): at 21:40

## 2020-02-22 RX ADMIN — Medication 25 MILLIGRAM(S): at 18:51

## 2020-02-22 RX ADMIN — Medication 500000 UNIT(S): at 12:32

## 2020-02-22 RX ADMIN — ERYTHROPOIETIN 10000 UNIT(S): 10000 INJECTION, SOLUTION INTRAVENOUS; SUBCUTANEOUS at 16:54

## 2020-02-22 RX ADMIN — OXYCODONE HYDROCHLORIDE 10 MILLIGRAM(S): 5 TABLET ORAL at 07:55

## 2020-02-22 RX ADMIN — APIXABAN 5 MILLIGRAM(S): 2.5 TABLET, FILM COATED ORAL at 06:42

## 2020-02-22 RX ADMIN — OXYCODONE HYDROCHLORIDE 5 MILLIGRAM(S): 5 TABLET ORAL at 07:54

## 2020-02-22 NOTE — PROGRESS NOTE ADULT - ASSESSMENT
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, DGF  S/p transplant kidney bx 12/13: ATN, focal TMA  S/p PLEX 12/13, 12/15; d/c-d 12/20 w/op HD 3 x wk  Re-adm 12/27 w/Flu A, infected carlos-nephric hematoma   S/p OR 1/1 for washout and repeat renal graft biopsy (ATN)  Dx w/RLE DVT  Developed gross hematuria  S/p OR 1/10 for infected carlos-nephric hematoma, hemorrhage involving pseudo-aneurysm of anastomosis of renal artery to external iliac vein,   s/p transplant nephrectomy, s/p IVCF   S/p PEA arrest 1/13, + PE, R retroperitoneal collection  S/p IR drainage of abd collection  RUE DVT, AC  Off antibiotics per ID  AVF infiltration 2/1 w/hematoma extending to L chest wall while on Eliquis, s/p PRBCs  Had HD session today  Epogen with H.D.  Fluid restriction  Labs with dialysis  Next HD/UF on Monday

## 2020-02-22 NOTE — PROGRESS NOTE ADULT - ASSESSMENT
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 Young, followed by Dr. 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 His course was complicated by RP hematoma and bladder perforation s/p repair and evacuation on 1/10, saddle PE s/p IVC filter on 1/10, acute DVT of RUE brachial vein, LUE AVF bleeding left deep brachial artery psuedoaneurysm, and cardiac arrest.     #Debility due to prolonged and complicated hospital course  -continue Comprehensive Rehab Program of PT/OT  -restorative dining added due to difficulty with container opening/hand use  -neuropsychology for support due to anxiety    #DDRT: c/b DGF/ATN/perinephric infected hematoma/pseudoneurysm/graft nephrectomy   - S/P AVF cannulation for HD c/b infiltration causing extravasation/bleeding to the left upper arm and chest noted on CTA  - S/P IR LUE arteriography showing a tiny pseudoaneurysm arising from a distal branch of the left deep brachial artery s/p dissection of a tiny feeder artery that resulted in resolution of the pseudoaneurysm.  - S/P IR permacath placement with TTS HD  - Eliquis 5mg BID started 2/12 and hold Aspirin    - Unable to continue with ciprofloxacin for abx course due to prolonged Qtc. ID consulted and completed one dose of amikacin 500mg after dialysis on 2/18 in order to complete abx course. Per ID, no need for additional antibiotics.  - pain control: Tylenol, Oxycodone. Neurontin discontinued on 2/15 due to prolonged QTc  - Wound care consult  appreciated 2/14: Recommendation: for the 2 wounds of the left upper arm area and the right groin area, suggest clean gently daily with normal saline, pat dry, then apply strip of aquacel ag, add few drops of normal saline as needed for aquacel to adhere to wound, cover with 2 inch gauze and paper tape, change daily.  - Thigh high compression stocking/ACE wrap to bilateral LE    - ACE wrap LUE from hand to shoulder  - Nephrology consult appreciated    #Saddle PE/multiple DVT: RUE brachial DVT,  R distal brachia vein , acute DVT of R external iliac, common femoral, femoral veins, R soleal vein   - S/p IVC filter on 1/9  - Apixaban 5mg BID  -monitor H/H, cardiopulmonary status      #New Afib/Vtach:  -Continue metoprolol  -Patient with prolonged QTC. Seen by hospitalist and cardiology 2/15:  ·	DC cipro and gabapentin  ·	serial ekgs-> 2/22 still prolonged QTC  Cardiology consulted about further medication management    #Amiodarone toxicity: rule out per cardiology. LFTs stable. TSH 15  -Follow up CXR-possuble fluid overload but might be bc patient was in dyalysis       #HTN:  -Continue metoprolol and hold on HD days per outside hospital    #ESRD: on HD  -Nephrology consult.on T/Th/Sat  -permacath care  -Epogen, nephrovite    #Glaucoma:  -Continue with drops    #Gout:  -Allopurinol    #RA:  -Continue with comprehensive rehab  -Continue with pain control as below  -rheumatology consult, appreciate recs    #Gas:  -Start simethicone prn    #Thrombocytosis:  -Monitor labs  -Hematology follow up as an outpatient    #Hyponatremia: Resolved  -Monitor labs    #Anemia:  -Continue with epogen  -Monitor labs    #Pain Mgmt   - Tylenol PRN, Oxycodone PRN. DC neurontin given QTc prolongation    #GI/Bowel Mgmt  -  Continent c/w  Senna,  Miralax  - Continue lactulose PRN    #/Bladder Mgmt -   HD    #FEN   - Diet - Regular + Thins, Renal Restrictions  - DC famotidine due to prolonged Qtc  -nutrition consult    Dispo:  Plan for discharge 3/3/20 with goals of min to mod assist.

## 2020-02-22 NOTE — PROGRESS NOTE ADULT - SUBJECTIVE AND OBJECTIVE BOX
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 Young, followed by Dr. Raphael, [facility] Oncologist), RA with hand contractures who presented to [facility] on 12/27/19 from dialysis for a fever. 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 His post op course c/b DGF requiring HD, thrombocytopenia, elevated LDH and Haptoglobulin. Schistocytes  on peripheral smear concerning for TMA. Envarsus held. Received Belatacept 12/13. Started on PLEX (12/12, 12/15). Underwent renal bx on 12/13 c/w profound ATN.  Found to have much improvement to levels, likely related to Tacrolimus. He was discharged home on 12/20/19 with outpatient HD.     Patient was now sent to [facility] ED on 12/17/19 by dialysis center after he was found to be febrile to 102, he did not get HD this AM, and last full HD session was on 12/26. Upon arrival, pt was found to have the flu and positive urine culture with Enterobacter. ID was consulted and pt was started on Tamiflu and Meropenam. Transplant team and Nephrology was following patient for HD. Pt was taken to the OR on 1/1 for evacuation of perinephretic hematoma which was infected with enterobacter and biopsy of transplanted kidney. Biopsy revealed ATN. Pt was recommended to continue on Meropenam as per ID. Pt also had a CT guided pelvic fluid aspiration by IR on 1/6. Post procedure pt had significant hematuria with clots for which urology was consulted. Pt placed on CBI and hematuria resolved. Pt had multiple antibiotics and antifungals added as per ID recommendations to due to rising WBC. Pt was taken back to the OR on 1/10 for evacuation of RP hematoma removal of external iliac artery stent, bovine pericardial patch repair of ext iliac artery, and ligation of external iliac vein. Pt was emergently taken to the OR after IR for explantation of transplanted kidney and repair of bladder perforation by transplant surgery. Vascular surgery called intra-op and repaired the right external iliac artery was repaired with a bovine pericardial patch, repaired the right external iliac vein, performed a thrombectomy of the right lower extremity veins and had an IVC filter placed. He had a renal US and IR guided drain and pigtail placed on 1/15. He had CT chest/abdomen/pelvis done on 1/21, which demonstrated persistent collection and so the IR pigtail drain was repositioned on 1/22.     His course was notable on 1/8 pt was found to have RLE DVT involving ext iliac, common fem veins, s/p thromobectomy and IVC filter placement, currently on treatment. Hospital course was further complicated by cardiac arrest on 1/13 due to massive acute saddle pulmonary embolism with acute cor pulmonale and acute DVT of the brachial vein in RUE. Pt was started on Eliquis. His course was complicated on 2/1 with AVF infiltrated during HD with aterial extravasation/bleeding to the left upper arm and chest seen on CTa. On 2/3, he had an arteriography, which demonstrated a tiny pseudoaneurysm arising from a distal branch of the left deep brachial artery and so permacath was placed on 2/11. He was also noted to have newly diagnosed afib and cardiology was following for medication management. He was also noted to have thrombocytosis and hematalogy-oncology work up was done with justin for outpatient follow up. He was also noted to have hyponatremia, which remained stable.    Patient medically stabilized and transferred to [facility] 2/14/20 for IRF services. (14 Feb 2020 12:48)              Review of Systems:   · Additional ROS	No acute events overnight. Patient reports that he is overall doing well. Reports that his swelling is improving. 	    Physical Exam:   · Constitutional	detailed exam	  · Constitutional Comments	alert O x 3.	  · Respiratory	detailed exam	  · Respiratory Details	breathing comfortably on room air, no increased work of breathing	  · Respiratory Comments	fair effort cath site clean chest wall right, no swelling or warmth no TTP	  · Cardiovascular	detailed exam	  · Cardiovascular Details	regular rate and rhythm	  · Cardiovascular Comments	warm, well perfused	  · Gastrointestinal	detailed exam	  · GI Normal	normal; soft; nontender	  · Extremities	detailed exam	  · Extremities Comments	BLE with ACE wraps in place good color distally, wiggle toes 1+ LE swelling RUE 1+ swelilng

## 2020-02-23 PROCEDURE — 99233 SBSQ HOSP IP/OBS HIGH 50: CPT | Mod: GC

## 2020-02-23 PROCEDURE — 99232 SBSQ HOSP IP/OBS MODERATE 35: CPT | Mod: GC

## 2020-02-23 PROCEDURE — 99233 SBSQ HOSP IP/OBS HIGH 50: CPT

## 2020-02-23 PROCEDURE — 93010 ELECTROCARDIOGRAM REPORT: CPT

## 2020-02-23 PROCEDURE — 71045 X-RAY EXAM CHEST 1 VIEW: CPT | Mod: 26

## 2020-02-23 RX ADMIN — BRIMONIDINE TARTRATE 1 DROP(S): 2 SOLUTION/ DROPS OPHTHALMIC at 21:18

## 2020-02-23 RX ADMIN — Medication 250 MILLIGRAM(S): at 05:23

## 2020-02-23 RX ADMIN — Medication 100 MILLIGRAM(S): at 14:42

## 2020-02-23 RX ADMIN — Medication 250 MILLIGRAM(S): at 17:19

## 2020-02-23 RX ADMIN — Medication 500000 UNIT(S): at 17:19

## 2020-02-23 RX ADMIN — OXYCODONE HYDROCHLORIDE 10 MILLIGRAM(S): 5 TABLET ORAL at 17:47

## 2020-02-23 RX ADMIN — LATANOPROST 1 DROP(S): 0.05 SOLUTION/ DROPS OPHTHALMIC; TOPICAL at 14:57

## 2020-02-23 RX ADMIN — OXYCODONE HYDROCHLORIDE 10 MILLIGRAM(S): 5 TABLET ORAL at 09:22

## 2020-02-23 RX ADMIN — BRIMONIDINE TARTRATE 1 DROP(S): 2 SOLUTION/ DROPS OPHTHALMIC at 05:24

## 2020-02-23 RX ADMIN — Medication 1 APPLICATION(S): at 05:24

## 2020-02-23 RX ADMIN — POLYETHYLENE GLYCOL 3350 17 GRAM(S): 17 POWDER, FOR SOLUTION ORAL at 15:12

## 2020-02-23 RX ADMIN — Medication 500000 UNIT(S): at 14:58

## 2020-02-23 RX ADMIN — Medication 500000 UNIT(S): at 05:24

## 2020-02-23 RX ADMIN — Medication 1 TABLET(S): at 14:42

## 2020-02-23 RX ADMIN — Medication 25 MILLIGRAM(S): at 17:19

## 2020-02-23 RX ADMIN — Medication 1 APPLICATION(S): at 21:17

## 2020-02-23 RX ADMIN — APIXABAN 5 MILLIGRAM(S): 2.5 TABLET, FILM COATED ORAL at 17:19

## 2020-02-23 RX ADMIN — OXYCODONE HYDROCHLORIDE 10 MILLIGRAM(S): 5 TABLET ORAL at 12:19

## 2020-02-23 RX ADMIN — APIXABAN 5 MILLIGRAM(S): 2.5 TABLET, FILM COATED ORAL at 06:43

## 2020-02-23 RX ADMIN — Medication 25 MILLIGRAM(S): at 05:23

## 2020-02-23 RX ADMIN — Medication 1 APPLICATION(S): at 15:02

## 2020-02-23 RX ADMIN — OXYCODONE HYDROCHLORIDE 10 MILLIGRAM(S): 5 TABLET ORAL at 17:17

## 2020-02-23 NOTE — PROGRESS NOTE ADULT - SUBJECTIVE AND OBJECTIVE BOX
Follow up for amiodarone   SUBJ:    sitting in chair     PMH  Erectile dysfunction  Glaucoma  Gout  HTN (hypertension)  ESRD (end stage renal disease) on dialysis  Contracture of finger joint  Carpal tunnel syndrome  Brain cyst  Anemia  Renal insufficiency  Gastric ulcer  Rheumatoid arthritis      MEDICATIONS  (STANDING):  allopurinol 100 milliGRAM(s) Oral daily  apixaban 5 milliGRAM(s) Oral two times a day  AQUAPHOR (petrolatum Ointment) 1 Application(s) Topical three times a day  brimonidine 0.2% Ophthalmic Solution 1 Drop(s) Both EYES three times a day  epoetin trey Injectable 04860 Unit(s) IV Push <User Schedule>  latanoprost 0.005% Ophthalmic Solution 1 Drop(s) Both EYES daily  metoprolol tartrate 25 milliGRAM(s) Oral every 12 hours  Nephro-candace 1 Tablet(s) Oral daily  nystatin    Suspension 968145 Unit(s) Oral four times a day  polyethylene glycol 3350 17 Gram(s) Oral daily  saccharomyces boulardii 250 milliGRAM(s) Oral two times a day  senna 2 Tablet(s) Oral at bedtime    MEDICATIONS  (PRN):  acetaminophen   Tablet .. 975 milliGRAM(s) Oral every 6 hours PRN Mild Pain (1 - 3)  benzocaine 15 mG/menthol 3.6 mG (Sugar-Free) Lozenge 1 Lozenge Oral three times a day PRN Sore Throat  bisacodyl Suppository 10 milliGRAM(s) Rectal daily PRN Constipation  lactulose Syrup 10 Gram(s) Oral daily PRN constipation  oxyCODONE    IR 5 milliGRAM(s) Oral every 4 hours PRN Moderate Pain (4 - 6)  oxyCODONE    IR 10 milliGRAM(s) Oral every 4 hours PRN Severe Pain (7 - 10)  simethicone 80 milliGRAM(s) Chew three times a day PRN Gas        PHYSICAL EXAM:  Vital Signs Last 24 Hrs  T(C): 37 (23 Feb 2020 09:38), Max: 37 (22 Feb 2020 19:55)  T(F): 98.6 (23 Feb 2020 09:38), Max: 98.6 (22 Feb 2020 19:55)  HR: 63 (23 Feb 2020 09:38) (62 - 68)  BP: 114/71 (23 Feb 2020 09:38) (114/71 - 175/90)  BP(mean): --  RR: 14 (23 Feb 2020 09:38) (14 - 15)  SpO2: 99% (23 Feb 2020 09:38) (98% - 100%)    GENERAL: NAD, hairloss  HEAD:  Atraumatic, Normocephalic  EYES: EOMI, PERRLA, conjunctiva and sclera clear  ENT: Moist mucous membranes,  NECK: Supple, No JVD, no bruits  CHEST/LUNG: Clear to percussion bilaterally; No rales, rhonchi, wheezing, or rubs  HEART: Regular rate and rhythm; No murmurs, rubs, or gallops PMI non displaced.  ABDOMEN: Soft, Nontender, Nondistended; Bowel sounds present  EXTREMITIES:  2+ Peripheral Pulses, No clubbing, cyanosis, or edema left leg edematous  SKIN: No rashes or lesions  NERVOUS SYSTEM:  Cranial Nerves II-XII intact      TELEMETRY:    ECG:    < from: 12 Lead ECG (02.23.20 @ 07:38) >  Ventricular Rate 58 BPM    Atrial Rate 58 BPM    P-R Interval 164 ms    QRS Duration 76 ms    Q-T Interval 582 ms    QTC Calculation(Bezet) 571 ms    P Axis 23 degrees    R Axis 37 degrees    T Axis -17 degrees    Diagnosis Line Sinus bradycardia  ST and T wave abnormality, consider anterior ischemia  Prolonged QT  Abnormal ECG  When compared with ECG of 22-FEB-2020 06:33,  QT has lengthened    Confirmed by LETICIA MUÑOZ, SHAILESH ([id number]) on 2/23/2020 8:58:31 AM    < end of copied text >    ECHO:    LABS:                        8.4    11.69 )-----------( 427      ( 22 Feb 2020 14:59 )             25.7     02-22    138  |  98  |  29<H>  ----------------------------<  99  4.1   |  29  |  5.74<H>    Ca    9.1      22 Feb 2020 14:59  Phos  4.0     02-22    TPro  x   /  Alb  2.2<L>  /  TBili  x   /  DBili  x   /  AST  x   /  ALT  x   /  AlkPhos  x   02-22      I&O's Summary    22 Feb 2020 07:01  -  23 Feb 2020 07:00  --------------------------------------------------------  IN: 600 mL / OUT: 0 mL / NET: 600 mL      BNP    RADIOLOGY & ADDITIONAL STUDIES:    < from: Xray Chest 1 View- PORTABLE-Routine (02.23.20 @ 15:18) >  IMPRESSION: Persistent basilar congestive findings.          ELLE SOTO M.D., ATTENDING RADIOLOGIST  This document has been electronicallysigned. Feb 23 2020  3:33PM    < end of copied text >      ECHO:

## 2020-02-23 NOTE — PROGRESS NOTE ADULT - ASSESSMENT
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 Young, followed by Dr. 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 His course was complicated by RP hematoma and bladder perforation s/p repair and evacuation on 1/10, saddle PE s/p IVC filter on 1/10, acute DVT of RUE brachial vein, LUE AVF bleeding left deep brachial artery psuedoaneurysm, and cardiac arrest.     #Debility due to prolonged and complicated hospital course  -continue Comprehensive Rehab Program of PT/OT  -restorative dining added due to difficulty with container opening/hand use  -neuropsychology for support due to anxiety    #DDRT: c/b DGF/ATN/perinephric infected hematoma/pseudoneurysm/graft nephrectomy   - S/P AVF cannulation for HD c/b infiltration causing extravasation/bleeding to the left upper arm and chest noted on CTA  - S/P IR LUE arteriography showing a tiny pseudoaneurysm arising from a distal branch of the left deep brachial artery s/p dissection of a tiny feeder artery that resulted in resolution of the pseudoaneurysm.  - S/P IR permacath placement with TTS HD  - Eliquis 5mg BID started 2/12 and hold Aspirin    - Unable to continue with ciprofloxacin for abx course due to prolonged Qtc. ID consulted and completed one dose of amikacin 500mg after dialysis on 2/18 in order to complete abx course. Per ID, no need for additional antibiotics.  - pain control: Tylenol, Oxycodone. Neurontin discontinued on 2/15 due to prolonged QTc  - Wound care consult  appreciated 2/14: Recommendation: for the 2 wounds of the left upper arm area and the right groin area, suggest clean gently daily with normal saline, pat dry, then apply strip of aquacel ag, add few drops of normal saline as needed for aquacel to adhere to wound, cover with 2 inch gauze and paper tape, change daily.  - Thigh high compression stocking/ACE wrap to bilateral LE    - ACE wrap LUE from hand to shoulder  - Nephrology consult appreciated    #Saddle PE/multiple DVT: RUE brachial DVT,  R distal brachia vein , acute DVT of R external iliac, common femoral, femoral veins, R soleal vein   - S/p IVC filter on 1/9  - Apixaban 5mg BID  -monitor H/H, cardiopulmonary status      #New Afib/Vtach:  -Continue metoprolol  -Patient with prolonged QTC. Seen by hospitalist and cardiology 2/15:  ·	DC cipro and gabapentin  ·	serial ekgs-> 2/22 still prolonged QTC  Cardiology consulted about further medication management    #Amiodarone toxicity: rule out per cardiology. LFTs stable. TSH 15  -Follow up CXR-possuble fluid overload but might be bc patient was in dyalysis       #HTN:  -Continue metoprolol and hold on HD days per outside hospital    #ESRD: on HD  -Nephrology consult.on T/Th/Sat  -permacath care  -Epogen, nephrovite    #Glaucoma:  -Continue with drops    #Gout:  -Allopurinol    #RA:  -Continue with comprehensive rehab  -Continue with pain control as below  -rheumatology consult, appreciate recs    #Gas:  -Start simethicone prn    #Thrombocytosis:  -Monitor labs  -Hematology follow up as an outpatient    #Hyponatremia: Resolved  -Monitor labs    #Anemia:  -Continue with epogen  -Monitor labs    #Pain Mgmt   - Tylenol PRN, Oxycodone PRN. DC neurontin given QTc prolongation    #GI/Bowel Mgmt  -  Continent c/w  Senna,  Miralax  - Continue lactulose PRN    #/Bladder Mgmt -   HD    #FEN   - Diet - Regular + Thins, Renal Restrictions  - DC famotidine due to prolonged Qtc  -nutrition consult    Dispo:  Plan for discharge 3/3/20 with goals of min to mod assist.

## 2020-02-23 NOTE — PROGRESS NOTE ADULT - ASSESSMENT
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, DGF  S/p transplant kidney bx 12/13: ATN, focal TMA  S/p PLEX 12/13, 12/15; d/c-d 12/20 w/op HD 3 x wk  Re-adm 12/27 w/Flu A, infected carlos-nephric hematoma   S/p OR 1/1 for washout and repeat renal graft biopsy (ATN)  Dx w/RLE DVT  Developed gross hematuria  S/p OR 1/10 for infected carlos-nephric hematoma, hemorrhage involving pseudo-aneurysm of anastomosis of renal artery to external iliac vein,   s/p transplant nephrectomy, s/p IVCF   S/p PEA arrest 1/13, + PE, R retroperitoneal collection  S/p IR drainage of abd collection  RUE DVT, AC  Off antibiotics per ID  AVF infiltration 2/1 w/hematoma extending to L chest wall while on Eliquis, s/p PRBCs  On daily HD/UF 6 times per week schedule  Had HD session yesterday, 3 kg removed  Epogen with H.D.  Fluid restriction  Labs with dialysis  Next UF tomorrow

## 2020-02-23 NOTE — PROGRESS NOTE ADULT - SUBJECTIVE AND OBJECTIVE BOX
Patient seen in follow up for ESRD. Feels better. No SOB. Remains with large anasarca.    Glaucoma  Gout  HTN (hypertension)  ESRD (end stage renal disease) on dialysis  Contracture of finger joint  Carpal tunnel syndrome  Brain cyst  Anemia  Renal insufficiency  Gastric ulcer  Rheumatoid arthritis    MEDICATIONS  (STANDING):  allopurinol 100 milliGRAM(s) Oral daily  apixaban 5 milliGRAM(s) Oral two times a day  AQUAPHOR (petrolatum Ointment) 1 Application(s) Topical three times a day  brimonidine 0.2% Ophthalmic Solution 1 Drop(s) Both EYES three times a day  epoetin trey Injectable 35826 Unit(s) IV Push <User Schedule>  latanoprost 0.005% Ophthalmic Solution 1 Drop(s) Both EYES daily  metoprolol tartrate 25 milliGRAM(s) Oral every 12 hours  Nephro-candace 1 Tablet(s) Oral daily  nystatin    Suspension 640036 Unit(s) Oral four times a day  polyethylene glycol 3350 17 Gram(s) Oral daily  saccharomyces boulardii 250 milliGRAM(s) Oral two times a day  senna 2 Tablet(s) Oral at bedtime    MEDICATIONS  (PRN):  acetaminophen   Tablet .. 975 milliGRAM(s) Oral every 6 hours PRN Mild Pain (1 - 3)  benzocaine 15 mG/menthol 3.6 mG (Sugar-Free) Lozenge 1 Lozenge Oral three times a day PRN Sore Throat  bisacodyl Suppository 10 milliGRAM(s) Rectal daily PRN Constipation  lactulose Syrup 10 Gram(s) Oral daily PRN constipation  oxyCODONE    IR 5 milliGRAM(s) Oral every 4 hours PRN Moderate Pain (4 - 6)  oxyCODONE    IR 10 milliGRAM(s) Oral every 4 hours PRN Severe Pain (7 - 10)  simethicone 80 milliGRAM(s) Chew three times a day PRN Gas    T(C): 37 (02-23-20 @ 09:38), Max: 37.1 (02-22-20 @ 14:50)  HR: 62 (02-23-20 @ 17:15) (59 - 68)  BP: 168/78 (02-23-20 @ 17:15) (114/71 - 175/90)  RR: 14 (02-23-20 @ 17:15) (14 - 15)  SpO2: 99% (02-23-20 @ 09:38) (98% - 100%)  Wt(kg): --  I&O's Detail    22 Feb 2020 07:01  -  23 Feb 2020 07:00  --------------------------------------------------------  IN:    Oral Fluid: 600 mL  Total IN: 600 mL    OUT:  Total OUT: 0 mL    Total NET: 600 mL      23 Feb 2020 07:01  -  23 Feb 2020 20:18  --------------------------------------------------------  IN:    Oral Fluid: 600 mL  Total IN: 600 mL    OUT:  Total OUT: 0 mL    Total NET: 600 mL      PHYSICAL EXAM:  AAO x3, in chair, in good spirits  Right I.J. HD cath dressed  Respiratory: decreased BS at bases  Cardiovascular: S1 S2  Gastrointestinal: soft NT ND +BS  Extremities:  LUE wrapped, less anasarca      CBC Full  -  ( 22 Feb 2020 14:59 )  WBC Count : 11.69 K/uL  RBC Count : 2.78 M/uL  Hemoglobin : 8.4 g/dL  Hematocrit : 25.7 %  Platelet Count - Automated : 427 K/uL  Mean Cell Volume : 92.4 fl  Mean Cell Hemoglobin : 30.2 pg  Mean Cell Hemoglobin Concentration : 32.7 gm/dL  Auto Neutrophil # : x  Auto Lymphocyte # : x  Auto Monocyte # : x  Auto Eosinophil # : x  Auto Basophil # : x  Auto Neutrophil % : x  Auto Lymphocyte % : x  Auto Monocyte % : x  Auto Eosinophil % : x  Auto Basophil % : x    02-22    138  |  98  |  29<H>  ----------------------------<  99  4.1   |  29  |  5.74<H>    Ca    9.1      22 Feb 2020 14:59  Phos  4.0     02-22    TPro  x   /  Alb  2.2<L>  /  TBili  x   /  DBili  x   /  AST  x   /  ALT  x   /  AlkPhos  x   02-22        Sodium, Serum: 138 (02-22 @ 14:59)    Creatinine, Serum: 5.74 (02-22 @ 14:59)    Potassium, Serum: 4.1 (02-22 @ 14:59)    Hemoglobin: 8.4 (02-22 @ 14:59)

## 2020-02-23 NOTE — PROGRESS NOTE ADULT - SUBJECTIVE AND OBJECTIVE BOX
Patient seen and examined at bedside. No overnight events per nursing or chart review. Patient states he is doing well. No issues with sleeping nor with dialysis. His 10 point ROS is negative    ALLERGIES:  coconut (Anaphylaxis)  morphine (Pruritus; Rash)    MEDICATIONS  (STANDING):  allopurinol 100 milliGRAM(s) Oral daily  apixaban 5 milliGRAM(s) Oral two times a day  AQUAPHOR (petrolatum Ointment) 1 Application(s) Topical three times a day  brimonidine 0.2% Ophthalmic Solution 1 Drop(s) Both EYES three times a day  epoetin trey Injectable 95422 Unit(s) IV Push <User Schedule>  latanoprost 0.005% Ophthalmic Solution 1 Drop(s) Both EYES daily  metoprolol tartrate 25 milliGRAM(s) Oral every 12 hours  Nephro-candace 1 Tablet(s) Oral daily  nystatin    Suspension 551381 Unit(s) Oral four times a day  polyethylene glycol 3350 17 Gram(s) Oral daily  saccharomyces boulardii 250 milliGRAM(s) Oral two times a day  senna 2 Tablet(s) Oral at bedtime    MEDICATIONS  (PRN):  acetaminophen   Tablet .. 975 milliGRAM(s) Oral every 6 hours PRN Mild Pain (1 - 3)  benzocaine 15 mG/menthol 3.6 mG (Sugar-Free) Lozenge 1 Lozenge Oral three times a day PRN Sore Throat  bisacodyl Suppository 10 milliGRAM(s) Rectal daily PRN Constipation  lactulose Syrup 10 Gram(s) Oral daily PRN constipation  oxyCODONE    IR 5 milliGRAM(s) Oral every 4 hours PRN Moderate Pain (4 - 6)  oxyCODONE    IR 10 milliGRAM(s) Oral every 4 hours PRN Severe Pain (7 - 10)  simethicone 80 milliGRAM(s) Chew three times a day PRN Gas    Vital Signs Last 24 Hrs  T(F): 98.6 (23 Feb 2020 09:38), Max: 98.8 (22 Feb 2020 14:50)  HR: 63 (23 Feb 2020 09:38) (62 - 68)  BP: 114/71 (23 Feb 2020 09:38) (114/71 - 175/90)  RR: 14 (23 Feb 2020 09:38) (14 - 15)  SpO2: 99% (23 Feb 2020 09:38) (98% - 100%)  I&O's Summary    22 Feb 2020 07:01  -  23 Feb 2020 07:00  --------------------------------------------------------  IN: 600 mL / OUT: 0 mL / NET: 600 mL        PHYSICAL EXAM:  Gen: nad, resting in bed  Neuro: aaox3, no focal deficits  Heent: eomi b/l, no jvd, no oral exudates  Pulm: cta b/l, no w/r/r  CV: +s1s2, no m/r/g, catheter site on chest wall appears clean and intact  Ab: soft, nt/nd, normoactive bs x 4  Extrem: no edema, pulses intact and equal, bandage of LUE noted       LABS:                        8.4    11.69 )-----------( 427      ( 22 Feb 2020 14:59 )             25.7       02-22    138  |  98  |  29  ----------------------------<  99  4.1   |  29  |  5.74    Ca    9.1      22 Feb 2020 14:59  Phos  4.0     02-22    TPro  x   /  Alb  2.2  /  TBili  x   /  DBili  x   /  AST  x   /  ALT  x   /  AlkPhos  x   02-22     eGFR if Non African American: 11 mL/min/1.73M2 (02-22-20 @ 14:59)  eGFR if : 12 mL/min/1.73M2 (02-22-20 @ 14:59)               TSH --   TSH with FT4 reflex --  Total T3 66                01-13 [id number] 5.2  12-11 [id number] 5.0          RADIOLOGY & ADDITIONAL TESTS:    Care Discussed with Consultants/Other Providers:

## 2020-02-23 NOTE — PROGRESS NOTE ADULT - ASSESSMENT
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 His course was further complicated by being cardiac arrest in setting of saddle PE. Additionally he was noted to have LUE AVF bleeding due to brachial artery pseudoaneurysm that underwent repair. Additionally he was noted to have an acute DVT of the RUE brachial vein as well. He is currently admitted to [facility] for acute rehabilitation of his functional deficits.  Rehab course complicated by prolongation of his QTc    Pulmonary  DVT/PE  - eliquis 5mg bid  - monitor BP closely   - s/p IVC filter  - ensure that patient is not straining with his BM given saddle embolus (see below)  Intersitial Lung Changes  - as seen on cxr   - please obtain repeat as likely related to volume in setting of pending HD   - pulmonary toxicity of amiodarone is a later effect 6-12 months with earliest onset of 2 months and thus unlikely to be driven from amiodarone toxicity  - if concern following repeat CXR can obtain HRCT of lungs to evaluate lung parenchyma for intersitial fibrosis.     Cardiovascular  Cardiac Arrest  - PT/OT per primary team  - given torsades arrest would avoid QTc prolonging agents  Atrial Fibrillation - rate controlled   - amiodarone was dced due to QTC  - metoprolol 25mg bid with hold parameters  - eliquis 5mg bid  Hypertension   - metoprolol (hold on dialysis days)  QT prolongation suspect medication induced  - QTC trended down to 481 but ECG on 2/23 showing QTc of 571  - Recent use of antiarrhythmic and fluoroquinolones, famotidine ([id number]) and oxycodone - all 4 meds with known side effect of QTC  - Amiodarone d/ezra 2/14, cipro d/ezra 2/15, famotidine d/ezra 2/19  - please d/c prn oxycodone given increase in ECG   - Will follow up with serial EKGs daily, please obtain another ECG for today  - Cardiology following    Endocrine  Elevated TSH - true hypothyroid vs sick euthyroid  - TSH elevated --> 15.1  - T3 low, 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 neprovite  Perinephritic Abscess   - cipro was dc/ed due to QTC prolongation   - monitor off abx as per ID    Rheumatology  Rheumatoid Arthritis  - would consider rheumatology consultation in regards to necessary medications, patient was previously being treated with immunosuppressant with transplant but is now off them. Given that these medications did have some disease modifying activity, he is at risk of RA flare given they are no longer being administered.   - please obtain rheumatology consultation  Gout  - allopurinol    Hematology  Leukocytosis - trending down  - likely in setting of resolving infection  - continue to monitor  - off abx as per ID  Thrombocytosis  - likely reactive  - Hematology follow up as an outpatient  Normocytic anemia  - likely multifactorial given surgeries   - also partially driven by renal disease  - continue epogen    MSK  LUE Hematoma  - continue to monitor closely given anticoagulation usage  - surgery recommendations appreciated    Optho  Glaucoma  -Continue with eye drops    Diet: renal/low potassium  Pain management: per primary team - no opioids/gabapentin  DVT prophylaxis: eliquis  Case discussed with PMR resident  If a clinical question should arise regarding this patient, please do not hesitate to contact me at [phone] until 7pm on 2/23/2020

## 2020-02-23 NOTE — PROGRESS NOTE ADULT - SUBJECTIVE AND OBJECTIVE BOX
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 Young, followed by Dr. Raphael, [facility] Oncologist), RA with hand contractures who presented to [facility] on 12/27/19 from dialysis for a fever. 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 His post op course c/b DGF requiring HD, thrombocytopenia, elevated LDH and Haptoglobulin. Schistocytes  on peripheral smear concerning for TMA. Envarsus held. Received Belatacept 12/13. Started on PLEX (12/12, 12/15). Underwent renal bx on 12/13 c/w profound ATN.  Found to have much improvement to levels, likely related to Tacrolimus. He was discharged home on 12/20/19 with outpatient HD.     Patient was now sent to [facility] ED on 12/17/19 by dialysis center after he was found to be febrile to 102, he did not get HD this AM, and last full HD session was on 12/26. Upon arrival, pt was found to have the flu and positive urine culture with Enterobacter. ID was consulted and pt was started on Tamiflu and Meropenam. Transplant team and Nephrology was following patient for HD. Pt was taken to the OR on 1/1 for evacuation of perinephretic hematoma which was infected with enterobacter and biopsy of transplanted kidney. Biopsy revealed ATN. Pt was recommended to continue on Meropenam as per ID. Pt also had a CT guided pelvic fluid aspiration by IR on 1/6. Post procedure pt had significant hematuria with clots for which urology was consulted. Pt placed on CBI and hematuria resolved. Pt had multiple antibiotics and antifungals added as per ID recommendations to due to rising WBC. Pt was taken back to the OR on 1/10 for evacuation of RP hematoma removal of external iliac artery stent, bovine pericardial patch repair of ext iliac artery, and ligation of external iliac vein. Pt was emergently taken to the OR after IR for explantation of transplanted kidney and repair of bladder perforation by transplant surgery. Vascular surgery called intra-op and repaired the right external iliac artery was repaired with a bovine pericardial patch, repaired the right external iliac vein, performed a thrombectomy of the right lower extremity veins and had an IVC filter placed. He had a renal US and IR guided drain and pigtail placed on 1/15. He had CT chest/abdomen/pelvis done on 1/21, which demonstrated persistent collection and so the IR pigtail drain was repositioned on 1/22.     His course was notable on 1/8 pt was found to have RLE DVT involving ext iliac, common fem veins, s/p thromobectomy and IVC filter placement, currently on treatment. Hospital course was further complicated by cardiac arrest on 1/13 due to massive acute saddle pulmonary embolism with acute cor pulmonale and acute DVT of the brachial vein in RUE. Pt was started on Eliquis. His course was complicated on 2/1 with AVF infiltrated during HD with aterial extravasation/bleeding to the left upper arm and chest seen on CTa. On 2/3, he had an arteriography, which demonstrated a tiny pseudoaneurysm arising from a distal branch of the left deep brachial artery and so permacath was placed on 2/11. He was also noted to have newly diagnosed afib and cardiology was following for medication management. He was also noted to have thrombocytosis and hematalogy-oncology work up was done with justin for outpatient follow up. He was also noted to have hyponatremia, which remained stable.    Patient medically stabilized and transferred to [facility] 2/14/20 for IRF services. (14 Feb 2020 12:48)              Review of Systems:   · Additional ROS	No acute events overnight. Patient reports that he is overall doing well.	    Physical Exam:   · Constitutional	detailed exam	  · Constitutional Comments	alert O x 3.	  · Respiratory	detailed exam	  · Respiratory Details	breathing comfortably on room air, no increased work of breathing	  · Respiratory Comments	fair effort cath site clean chest wall right, no swelling or warmth no TTP	  · Cardiovascular	detailed exam	  · Cardiovascular Details	regular rate and rhythm	  · Cardiovascular Comments	warm, well perfused	  · Gastrointestinal	detailed exam	  · GI Normal	normal; soft; nontender	  · Extremities	detailed exam	  · Extremities Comments	BLE with ACE wraps in place good color distally, wiggle toes 1+ LE swelling RUE 1+ swelilng

## 2020-02-23 NOTE — PROGRESS NOTE ADULT - ATTENDING COMMENTS
amiodarone  agree with discontinuation of amiodarone. risk greater than benefit. we note basilar infiltrates on x-ray along with marked elevation in TSH 15. and continued prolonged QT interval . may require thyroid evaluation and supplementation.

## 2020-02-24 PROCEDURE — 99233 SBSQ HOSP IP/OBS HIGH 50: CPT

## 2020-02-24 PROCEDURE — 99232 SBSQ HOSP IP/OBS MODERATE 35: CPT | Mod: GC

## 2020-02-24 RX ORDER — CHLORHEXIDINE GLUCONATE 213 G/1000ML
1 SOLUTION TOPICAL DAILY
Refills: 0 | Status: DISCONTINUED | OUTPATIENT
Start: 2020-02-24 | End: 2020-03-03

## 2020-02-24 RX ADMIN — Medication 25 MILLIGRAM(S): at 17:55

## 2020-02-24 RX ADMIN — LATANOPROST 1 DROP(S): 0.05 SOLUTION/ DROPS OPHTHALMIC; TOPICAL at 11:35

## 2020-02-24 RX ADMIN — Medication 500000 UNIT(S): at 22:25

## 2020-02-24 RX ADMIN — Medication 500000 UNIT(S): at 18:28

## 2020-02-24 RX ADMIN — Medication 1 APPLICATION(S): at 05:13

## 2020-02-24 RX ADMIN — APIXABAN 5 MILLIGRAM(S): 2.5 TABLET, FILM COATED ORAL at 05:12

## 2020-02-24 RX ADMIN — Medication 250 MILLIGRAM(S): at 17:54

## 2020-02-24 RX ADMIN — Medication 100 MILLIGRAM(S): at 11:35

## 2020-02-24 RX ADMIN — Medication 1 APPLICATION(S): at 22:24

## 2020-02-24 RX ADMIN — OXYCODONE HYDROCHLORIDE 5 MILLIGRAM(S): 5 TABLET ORAL at 17:58

## 2020-02-24 RX ADMIN — OXYCODONE HYDROCHLORIDE 5 MILLIGRAM(S): 5 TABLET ORAL at 18:52

## 2020-02-24 RX ADMIN — BRIMONIDINE TARTRATE 1 DROP(S): 2 SOLUTION/ DROPS OPHTHALMIC at 15:03

## 2020-02-24 RX ADMIN — Medication 500000 UNIT(S): at 11:36

## 2020-02-24 RX ADMIN — BRIMONIDINE TARTRATE 1 DROP(S): 2 SOLUTION/ DROPS OPHTHALMIC at 05:12

## 2020-02-24 RX ADMIN — Medication 1 APPLICATION(S): at 14:46

## 2020-02-24 RX ADMIN — APIXABAN 5 MILLIGRAM(S): 2.5 TABLET, FILM COATED ORAL at 17:54

## 2020-02-24 RX ADMIN — OXYCODONE HYDROCHLORIDE 5 MILLIGRAM(S): 5 TABLET ORAL at 11:55

## 2020-02-24 RX ADMIN — Medication 250 MILLIGRAM(S): at 05:12

## 2020-02-24 RX ADMIN — Medication 500000 UNIT(S): at 05:12

## 2020-02-24 RX ADMIN — CHLORHEXIDINE GLUCONATE 1 APPLICATION(S): 213 SOLUTION TOPICAL at 15:01

## 2020-02-24 RX ADMIN — Medication 1 TABLET(S): at 11:35

## 2020-02-24 RX ADMIN — Medication 500000 UNIT(S): at 00:32

## 2020-02-24 RX ADMIN — OXYCODONE HYDROCHLORIDE 5 MILLIGRAM(S): 5 TABLET ORAL at 10:55

## 2020-02-24 NOTE — PROGRESS NOTE ADULT - ASSESSMENT
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 on 12/27/19 from dialysis for a fever found to be flu positive treated with tamiflu and have enterobacter infected perinephritic hematoma whose course was complicated by DVT and then RP hematoma and bladder perforation s/p repair and evacuation. His course was further complicated by being cardiac arrest in setting of saddle PE. Additionally he was noted to have LUE AVF bleeding due to brachial artery pseudoaneurysm that underwent repair. Additionally he was noted to have an acute DVT of the RUE brachial vein as well. He is currently admitted to [facility] for acute rehabilitation of his functional deficits.  Rehab course complicated by prolongation of his QTc- PT/OT, DVT PPX    DVT/PE- eliquis ,s/p IVC filter    Atrial Fibrillation -  amiodarone was dced ,metoprolol , Eliquis    Hypertension -metoprolol ,QT prolongation suspect medication induced, CARDIO CONSULT NOTED    Elevated TSH - REPEAT IN 1 WEEK     ESRD on HD (normally T-Th-Sat)- Nephrology following - renal diet    ANEMIA- epogen with HD    Perinephritic Abscess - cipro was dc/ed due to QTC prolongation , ID RECC NOTED    Gout- allopurinol    Leukocytosis - trending down, likely in setting of resolving infection    Thrombocytosis- likely reactive    LUE Hematoma- continue to monitor closely given anticoagulation usage, surgery recommendations appreciated    DVT prophylaxis: Eliquis

## 2020-02-24 NOTE — PROGRESS NOTE ADULT - SUBJECTIVE AND OBJECTIVE BOX
[facility] NEPHROLOGY SERVICES, [facility]  NEPHROLOGY AND HYPERTENSION  [address]  [address]  [place] [address]  [phone]    MD GIANA GUERRERO, MD BEAN ARAUJO, MD JANAE VICTORIA, MD BARBARA MONET, MD SALUD GARZA, MD CATRINA WOO, MD    Resting, no CP, SOB    Vital Signs Last 24 Hrs  T(C): 36.7 (02-24-20 @ 07:47), Max: 36.8 (02-23-20 @ 20:42)  T(F): 98 (02-24-20 @ 07:47), Max: 98.2 (02-23-20 @ 20:42)  HR: 57 (02-24-20 @ 07:47) (57 - 63)  BP: 131/76 (02-24-20 @ 07:47) (131/76 - 168/78)  RR: 14 (02-24-20 @ 07:47) (14 - 14)  SpO2: 97% (02-24-20 @ 07:47) (97% - 100%)    Respiratory: decreased BS at bases  Cardiovascular: S1 S2  Gastrointestinal: soft NT ND +BS  Extremities: anasarca    acetaminophen   Tablet .. 975 milliGRAM(s) Oral every 6 hours PRN  allopurinol 100 milliGRAM(s) Oral daily  apixaban 5 milliGRAM(s) Oral two times a day  AQUAPHOR (petrolatum Ointment) 1 Application(s) Topical three times a day  benzocaine 15 mG/menthol 3.6 mG (Sugar-Free) Lozenge 1 Lozenge Oral three times a day PRN  bisacodyl Suppository 10 milliGRAM(s) Rectal daily PRN  brimonidine 0.2% Ophthalmic Solution 1 Drop(s) Both EYES three times a day  chlorhexidine 4% Liquid 1 Application(s) Topical daily  epoetin trey Injectable 95571 Unit(s) IV Push <User Schedule>  lactulose Syrup 10 Gram(s) Oral daily PRN  latanoprost 0.005% Ophthalmic Solution 1 Drop(s) Both EYES daily  metoprolol tartrate 25 milliGRAM(s) Oral every 12 hours  Nephro-candace 1 Tablet(s) Oral daily  nystatin    Suspension 426401 Unit(s) Oral four times a day  oxyCODONE    IR 5 milliGRAM(s) Oral every 4 hours PRN  oxyCODONE    IR 10 milliGRAM(s) Oral every 4 hours PRN  polyethylene glycol 3350 17 Gram(s) Oral daily  saccharomyces boulardii 250 milliGRAM(s) Oral two times a day  senna 2 Tablet(s) Oral at bedtime  simethicone 80 milliGRAM(s) Chew three times a day PRN                        8.4    11.69 )-----------( 427      ( 22 Feb 2020 14:59 )             25.7     22 Feb 2020 14:59    138    |  98     |  29     ----------------------------<  99     4.1     |  29     |  5.74     Ca    9.1        22 Feb 2020 14:59  Phos  4.0       22 Feb 2020 14:59    TPro  x      /  Alb  2.2    /  TBili  x      /  DBili  x      /  AST  x      /  ALT  x      /  AlkPhos  x      22 Feb 2020 14:59    LIVER FUNCTIONS - ( 22 Feb 2020 14:59 )  Alb: 2.2 g/dL / Pro: x     / ALK PHOS: x     / ALT: x     / AST: x     / GGT: x           Assessment/Plan:    Hx ESRD on HD  S/p DDRT at [facility] 12/8/19, DGF  S/p transplant kidney bx 12/13: ATN, focal TMA  S/p PLEX 12/13, 12/15; d/c-d 12/20 w/op HD 3 x wk  Re-adm 12/27 to [facility] w/Flu A, infected carlos-nephric hematoma   S/p OR 1/1 for washout and repeat renal graft biopsy (ATN)  Dx w/RLE DVT  S/p OR 1/10 for infected carlos-nephric hematoma, hemorrhage involving pseudo-aneurysm of anastomosis of renal artery to external iliac vein,   s/p transplant nephrectomy, s/p IVCF   S/p PEA arrest 1/13, + PE, R retroperitoneal collection  S/p IR drainage of abd collection  RUE DVT, AC  AVF infiltration 2/1 w/hematoma extending to L chest wall while on Eliquis, s/p PRBCs  S/p perm cath  Tx to rehab 2/14  HD TTS  TMP as able    [phone] [facility] NEPHROLOGY SERVICES, [facility]  NEPHROLOGY AND HYPERTENSION  [address]  [address]  [place] [address]  [phone]    MD GIANA GUERRERO, MD BEAN ARAUJO, MD JANAE VICTORIA, MD BARBARA MONET, MD SALUD GARZA, MD CATRINA WOO, MD    Resting, no CP, SOB    Vital Signs Last 24 Hrs  T(C): 36.7 (02-24-20 @ 07:47), Max: 36.8 (02-23-20 @ 20:42)  T(F): 98 (02-24-20 @ 07:47), Max: 98.2 (02-23-20 @ 20:42)  HR: 57 (02-24-20 @ 07:47) (57 - 63)  BP: 131/76 (02-24-20 @ 07:47) (131/76 - 168/78)  RR: 14 (02-24-20 @ 07:47) (14 - 14)  SpO2: 97% (02-24-20 @ 07:47) (97% - 100%)    Respiratory: decreased BS at bases  Cardiovascular: S1 S2  Gastrointestinal: soft NT ND +BS  Extremities: edema improving  AO    acetaminophen   Tablet .. 975 milliGRAM(s) Oral every 6 hours PRN  allopurinol 100 milliGRAM(s) Oral daily  apixaban 5 milliGRAM(s) Oral two times a day  AQUAPHOR (petrolatum Ointment) 1 Application(s) Topical three times a day  benzocaine 15 mG/menthol 3.6 mG (Sugar-Free) Lozenge 1 Lozenge Oral three times a day PRN  bisacodyl Suppository 10 milliGRAM(s) Rectal daily PRN  brimonidine 0.2% Ophthalmic Solution 1 Drop(s) Both EYES three times a day  chlorhexidine 4% Liquid 1 Application(s) Topical daily  epoetin trey Injectable 47652 Unit(s) IV Push <User Schedule>  lactulose Syrup 10 Gram(s) Oral daily PRN  latanoprost 0.005% Ophthalmic Solution 1 Drop(s) Both EYES daily  metoprolol tartrate 25 milliGRAM(s) Oral every 12 hours  Nephro-candace 1 Tablet(s) Oral daily  nystatin    Suspension 646408 Unit(s) Oral four times a day  oxyCODONE    IR 5 milliGRAM(s) Oral every 4 hours PRN  oxyCODONE    IR 10 milliGRAM(s) Oral every 4 hours PRN  polyethylene glycol 3350 17 Gram(s) Oral daily  saccharomyces boulardii 250 milliGRAM(s) Oral two times a day  senna 2 Tablet(s) Oral at bedtime  simethicone 80 milliGRAM(s) Chew three times a day PRN                        8.4    11.69 )-----------( 427      ( 22 Feb 2020 14:59 )             25.7     22 Feb 2020 14:59    138    |  98     |  29     ----------------------------<  99     4.1     |  29     |  5.74     Ca    9.1        22 Feb 2020 14:59  Phos  4.0       22 Feb 2020 14:59    TPro  x      /  Alb  2.2    /  TBili  x      /  DBili  x      /  AST  x      /  ALT  x      /  AlkPhos  x      22 Feb 2020 14:59    LIVER FUNCTIONS - ( 22 Feb 2020 14:59 )  Alb: 2.2 g/dL / Pro: x     / ALK PHOS: x     / ALT: x     / AST: x     / GGT: x           Assessment/Plan:    Hx ESRD on HD  S/p DDRT at [facility] 12/8/19, DGF  S/p transplant kidney bx 12/13: ATN, focal TMA  S/p PLEX 12/13, 12/15; d/c-d 12/20 w/op HD 3 x wk  Re-adm 12/27 to [facility] w/Flu A, infected carlos-nephric hematoma   S/p OR 1/1 for washout and repeat renal graft biopsy (ATN)  Dx w/RLE DVT  S/p OR 1/10 for infected carlos-nephric hematoma, hemorrhage involving pseudo-aneurysm of anastomosis of renal artery to external iliac vein,   s/p transplant nephrectomy, s/p IVCF   S/p PEA arrest 1/13, + PE, R retroperitoneal collection  S/p IR drainage of abd collection  RUE DVT, AC  AVF infiltration 2/1 w/hematoma extending to L chest wall while on Eliquis, s/p PRBCs  S/p perm cath  Tx to acute rehab 2/14  S/p daily HD last week for volume overload  Improved, wishes to not have HD today  HD TTS  TMP as able  Will follow    [phone]

## 2020-02-24 NOTE — PROGRESS NOTE ADULT - SUBJECTIVE AND OBJECTIVE BOX
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 His course was further complicated by being cardiac arrest in setting of saddle PE. Additionally he was noted to have LUE AVF bleeding due to brachial artery pseudoaneurysm that underwent repair. Additionally he was noted to have an acute DVT of the RUE brachial vein    Patient seen and examined at bedside. No overnight events , feeling well. no n/v, no sob      Vital Signs Last 24 Hrs  T(C): 36.7 (24 Feb 2020 07:47), Max: 36.8 (23 Feb 2020 20:42)  T(F): 98 (24 Feb 2020 07:47), Max: 98.2 (23 Feb 2020 20:42)  HR: 57 (24 Feb 2020 07:47) (57 - 63)  BP: 131/76 (24 Feb 2020 07:47) (131/76 - 168/78)  BP(mean): --  RR: 14 (24 Feb 2020 07:47) (14 - 14)  SpO2: 97% (24 Feb 2020 07:47) (97% - 100%)    GENERAL- NAD  EAR/NOSE/MOUTH/THROAT - no pharyngeal exudates, no oral leisions,  MMM  EYES- JUAN, conjunctiva and Sclera clear  NECK- supple  RESPIRATORY-  clear to auscultation bilaterally  CARDIOVASCULAR - SIS2, RRR  GI - soft NT BS present, left sided wound dressing intact  EXTREMITIES- b/l LE edema  NEUROLOGY- B /L LE WEAKNESS  PSYCHIATRY- AAO X 3  MUSCULOSKELETAL- ROM RESTRICTED TO B/L LE                 8.4                  138  | 29   | 29           11.69 >-----------< 427     ------------------------< 99                    25.7                 4.1  | 98   | 5.74                                             Ca 9.1   Mg x     Ph 4.0      Labs reviewed:       ECG reviewed and interpreted: SINUS AT 58    CXR personally reviewed:     < from: Xray Chest 1 View- PORTABLE-Routine (02.23.20 @ 15:18) >    EXAM:  XR CHEST PORTABLE ROUTINE 1V      PROCEDURE DATE:  02/23/2020        INTERPRETATION:  AP chest on February 23, 2020 3:03 PM.    Heart is magnified by technique.    Large-caliber double-lumen right jugular line again noted.    Basilar infiltrates with mild right base effusion again noted suggesting CHF.    Chest is similar to February 22.    IMPRESSION: Persistent basilar congestive findings.    < end of copied text >      MEDICATIONS  (STANDING):  allopurinol 100 milliGRAM(s) Oral daily  apixaban 5 milliGRAM(s) Oral two times a day  AQUAPHOR (petrolatum Ointment) 1 Application(s) Topical three times a day  brimonidine 0.2% Ophthalmic Solution 1 Drop(s) Both EYES three times a day  chlorhexidine 4% Liquid 1 Application(s) Topical daily  epoetin trey Injectable 35011 Unit(s) IV Push <User Schedule>  latanoprost 0.005% Ophthalmic Solution 1 Drop(s) Both EYES daily  metoprolol tartrate 25 milliGRAM(s) Oral every 12 hours  Nephro-candace 1 Tablet(s) Oral daily  nystatin    Suspension 820748 Unit(s) Oral four times a day  polyethylene glycol 3350 17 Gram(s) Oral daily  saccharomyces boulardii 250 milliGRAM(s) Oral two times a day  senna 2 Tablet(s) Oral at bedtime    MEDICATIONS  (PRN):  acetaminophen   Tablet .. 975 milliGRAM(s) Oral every 6 hours PRN Mild Pain (1 - 3)  benzocaine 15 mG/menthol 3.6 mG (Sugar-Free) Lozenge 1 Lozenge Oral three times a day PRN Sore Throat  bisacodyl Suppository 10 milliGRAM(s) Rectal daily PRN Constipation  lactulose Syrup 10 Gram(s) Oral daily PRN constipation  oxyCODONE    IR 5 milliGRAM(s) Oral every 4 hours PRN Moderate Pain (4 - 6)  oxyCODONE    IR 10 milliGRAM(s) Oral every 4 hours PRN Severe Pain (7 - 10)  simethicone 80 milliGRAM(s) Chew three times a day PRN Gas

## 2020-02-24 NOTE — PROGRESS NOTE ADULT - SUBJECTIVE AND OBJECTIVE BOX
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 Young, followed by Dr. Raphael, [facility] Oncologist), RA with hand contractures who presented to [facility] on 12/27/19 from dialysis for a fever. 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 His post op course c/b DGF requiring HD, thrombocytopenia, elevated LDH and Haptoglobulin. Schistocytes  on peripheral smear concerning for TMA. Envarsus held. Received Belatacept 12/13. Started on PLEX (12/12, 12/15). Underwent renal bx on 12/13 c/w profound ATN.  Found to have much improvement to levels, likely related to Tacrolimus. He was discharged home on 12/20/19 with outpatient HD.     Patient was now sent to [facility] ED on 12/17/19 by dialysis center after he was found to be febrile to 102, he did not get HD this AM, and last full HD session was on 12/26. Upon arrival, pt was found to have the flu and positive urine culture with Enterobacter. ID was consulted and pt was started on Tamiflu and Meropenam. Transplant team and Nephrology was following patient for HD. Pt was taken to the OR on 1/1 for evacuation of perinephretic hematoma which was infected with enterobacter and biopsy of transplanted kidney. Biopsy revealed ATN. Pt was recommended to continue on Meropenam as per ID. Pt also had a CT guided pelvic fluid aspiration by IR on 1/6. Post procedure pt had significant hematuria with clots for which urology was consulted. Pt placed on CBI and hematuria resolved. Pt had multiple antibiotics and antifungals added as per ID recommendations to due to rising WBC. Pt was taken back to the OR on 1/10 for evacuation of RP hematoma removal of external iliac artery stent, bovine pericardial patch repair of ext iliac artery, and ligation of external iliac vein. Pt was emergently taken to the OR after IR for explantation of transplanted kidney and repair of bladder perforation by transplant surgery. Vascular surgery called intra-op and repaired the right external iliac artery was repaired with a bovine pericardial patch, repaired the right external iliac vein, performed a thrombectomy of the right lower extremity veins and had an IVC filter placed. He had a renal US and IR guided drain and pigtail placed on 1/15. He had CT chest/abdomen/pelvis done on 1/21, which demonstrated persistent collection and so the IR pigtail drain was repositioned on 1/22.     His course was notable on 1/8 pt was found to have RLE DVT involving ext iliac, common fem veins, s/p thromobectomy and IVC filter placement, currently on treatment. Hospital course was further complicated by cardiac arrest on 1/13 due to massive acute saddle pulmonary embolism with acute cor pulmonale and acute DVT of the brachial vein in RUE. Pt was started on Eliquis. His course was complicated on 2/1 with AVF infiltrated during HD with aterial extravasation/bleeding to the left upper arm and chest seen on CTa. On 2/3, he had an arteriography, which demonstrated a tiny pseudoaneurysm arising from a distal branch of the left deep brachial artery and so permacath was placed on 2/11. He was also noted to have newly diagnosed afib and cardiology was following for medication management. He was also noted to have thrombocytosis and hematalogy-oncology work up was done with justin for outpatient follow up. He was also noted to have hyponatremia, which remained stable.    Patient medically stabilized and transferred to [facility] 2/14/20 for IRF services. (14 Feb 2020 12:48)              Review of Systems:   · Additional ROS	No acute events overnight. Patient reports that he is overall doing well. Pain controlled. Denies HA, dizziness, CP, SOB, abdominal pain, constipation. 	    Physical Exam:   · Constitutional	detailed exam	  · Constitutional Comments	alert O x 3.	  · Respiratory	detailed exam	  · Respiratory Details	breathing comfortably on room air, no increased work of breathing. CTA 	  · Respiratory Comments	fair effort, cath site clean chest wall right, no swelling or warmth no TTP	  · Cardiovascular	detailed exam	  · Cardiovascular Details	regular rate and rhythm	  · Cardiovascular Comments	warm, well perfused	  · Gastrointestinal	detailed exam	  · GI Normal	normal; soft; nontender, dressing to right lower quadrant C/D/I 	  · Extremities	detailed exam	  · Extremities Comments	BLE with ACE wraps in place good color distally, wiggle toes 1+ LE swelling RUE 1+ swelling

## 2020-02-24 NOTE — PROGRESS NOTE ADULT - ASSESSMENT
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 Young, followed by Dr. Raphael, [facility] Oncologist), RA with hand contractures, DDRT 12/8/18 c/b DGF requiring who presented to [facility] on 12/27/19 from dialysis for a fever found to be flu positive treated with tamiflu and have enterobacter infected perinephretic hematoma being admitted here  with Gait Instability, ADL impairments and Functional impairments. His course was complicated by RP hematoma and bladder perforation s/p repair and evacuation on 1/10, saddle PE s/p IVC filter on 1/10, acute DVT of RUE brachial vein, LUE AVF bleeding left deep brachial artery psuedoaneurysm, and cardiac arrest.     #Debility due to prolonged and complicated hospital course  -continue Comprehensive Rehab Program of PT/OT  -restorative dining added due to difficulty with container opening/hand use  -neuropsychology for support due to anxiety    #DDRT: c/b DGF/ATN/perinephric infected hematoma/pseudoneurysm/graft nephrectomy   - S/P AVF cannulation for HD c/b infiltration causing extravasation/bleeding to the left upper arm and chest noted on CTA  - S/P IR LUE arteriography showing a tiny pseudoaneurysm arising from a distal branch of the left deep brachial artery s/p dissection of a tiny feeder artery that resulted in resolution of the pseudoaneurysm.  - S/P IR permacath placement with TTS HD  - Eliquis 5mg BID started 2/12 and hold Aspirin    - Unable to continue with ciprofloxacin for abx course due to prolonged Qtc. ID consulted and completed one dose of amikacin 500mg after dialysis on 2/18 in order to complete abx course. Per ID, no need for additional antibiotics.  - pain control: Tylenol, Oxycodone. Neurontin discontinued on 2/15 due to prolonged QTc  - Wound care consult  appreciated 2/14: Recommendation: for the 2 wounds of the left upper arm area and the right groin area, suggest clean gently daily with normal saline, pat dry, then apply strip of aquacel ag, add few drops of normal saline as needed for aquacel to adhere to wound, cover with 2 inch gauze and paper tape, change daily.  - Thigh high compression stocking/ACE wrap to bilateral LE    - ACE wrap LUE from hand to shoulder  - Nephrology consult appreciated    #Saddle PE/multiple DVT: RUE brachial DVT,  R distal brachia vein , acute DVT of R external iliac, common femoral, femoral veins, R soleal vein   - S/p IVC filter on 1/9  - Apixaban 5mg BID  -monitor H/H, cardiopulmonary status      #New Afib/Vtach:  -Continue metoprolol  -Patient with prolonged QTC. Seen by hospitalist and cardiology 2/15:  ·	DC cipro and gabapentin  ·	serial ekgs-> 2/23 still prolonged QTC  Cardiology consulted and discontinued amiodarone     #Amiodarone toxicity: rule out per cardiology. LFTs stable. TSH 15  -Follow up CXR-possible fluid overload but might be bc patient was in dyalysis       #Elevated TSH  TSH 15  Total T3 66  Free thyroxine 1.5  T4 8.9   Per hospitalist repeat TSH in w week       #HTN:  -Continue metoprolol and hold on HD days per outside hospital    #ESRD: on HD  -Nephrology consult.on T/Th/Sat  -permacath care  -Epogen, nephrovite    #Glaucoma:  -Continue with drops    #Gout:  -Allopurinol    #RA:  -Continue with comprehensive rehab  -Continue with pain control as below  -rheumatology consult, appreciate recs    #Gas: improved   -simethicone prn    #Thrombocytosis:  -Monitor labs  -Hematology follow up as an outpatient    #Hyponatremia: Resolved  -Monitor labs    #Anemia: stable   -Continue with epogen  -Monitor labs    #Pain Mgmt   - Tylenol PRN, Oxycodone PRN. DC neurontin given QTc prolongation    #GI/Bowel Mgmt  -  Continent c/w  Senna,  Miralax  - Continue lactulose PRN    #/Bladder Mgmt -   HD    #FEN   - Diet - Regular + Thins, Renal Restrictions  - DC famotidine due to prolonged Qtc  -nutrition consult    Dispo:  Plan for discharge 3/3/20 with goals of min to mod assist.

## 2020-02-25 LAB
ALBUMIN SERPL ELPH-MCNC: 2.3 G/DL — LOW (ref 3.3–5)
ANION GAP SERPL CALC-SCNC: 10 MMOL/L — SIGNIFICANT CHANGE UP (ref 5–17)
BUN SERPL-MCNC: 40 MG/DL — HIGH (ref 7–23)
CALCIUM SERPL-MCNC: 8.6 MG/DL — SIGNIFICANT CHANGE UP (ref 8.4–10.5)
CHLORIDE SERPL-SCNC: 98 MMOL/L — SIGNIFICANT CHANGE UP (ref 96–108)
CO2 SERPL-SCNC: 29 MMOL/L — SIGNIFICANT CHANGE UP (ref 22–31)
CREAT SERPL-MCNC: 6.56 MG/DL — HIGH (ref 0.5–1.3)
GLUCOSE SERPL-MCNC: 96 MG/DL — SIGNIFICANT CHANGE UP (ref 70–99)
HCT VFR BLD CALC: 25.5 % — LOW (ref 39–50)
HGB BLD-MCNC: 8.5 G/DL — LOW (ref 13–17)
MCHC RBC-ENTMCNC: 29.8 PG — SIGNIFICANT CHANGE UP (ref 27–34)
MCHC RBC-ENTMCNC: 33.3 GM/DL — SIGNIFICANT CHANGE UP (ref 32–36)
MCV RBC AUTO: 89.5 FL — SIGNIFICANT CHANGE UP (ref 80–100)
NRBC # BLD: 0 /100 WBCS — SIGNIFICANT CHANGE UP (ref 0–0)
PHOSPHATE SERPL-MCNC: 5.5 MG/DL — HIGH (ref 2.5–4.5)
PLATELET # BLD AUTO: 432 K/UL — HIGH (ref 150–400)
POTASSIUM SERPL-MCNC: 4.3 MMOL/L — SIGNIFICANT CHANGE UP (ref 3.5–5.3)
POTASSIUM SERPL-SCNC: 4.3 MMOL/L — SIGNIFICANT CHANGE UP (ref 3.5–5.3)
RBC # BLD: 2.85 M/UL — LOW (ref 4.2–5.8)
RBC # FLD: 16.5 % — HIGH (ref 10.3–14.5)
SODIUM SERPL-SCNC: 137 MMOL/L — SIGNIFICANT CHANGE UP (ref 135–145)
WBC # BLD: 10.64 K/UL — HIGH (ref 3.8–10.5)
WBC # FLD AUTO: 10.64 K/UL — HIGH (ref 3.8–10.5)

## 2020-02-25 PROCEDURE — 99232 SBSQ HOSP IP/OBS MODERATE 35: CPT

## 2020-02-25 PROCEDURE — 99232 SBSQ HOSP IP/OBS MODERATE 35: CPT | Mod: GC

## 2020-02-25 RX ADMIN — POLYETHYLENE GLYCOL 3350 17 GRAM(S): 17 POWDER, FOR SOLUTION ORAL at 13:03

## 2020-02-25 RX ADMIN — Medication 500000 UNIT(S): at 21:55

## 2020-02-25 RX ADMIN — BRIMONIDINE TARTRATE 1 DROP(S): 2 SOLUTION/ DROPS OPHTHALMIC at 13:03

## 2020-02-25 RX ADMIN — ERYTHROPOIETIN 10000 UNIT(S): 10000 INJECTION, SOLUTION INTRAVENOUS; SUBCUTANEOUS at 16:53

## 2020-02-25 RX ADMIN — Medication 100 MILLIGRAM(S): at 12:56

## 2020-02-25 RX ADMIN — LATANOPROST 1 DROP(S): 0.05 SOLUTION/ DROPS OPHTHALMIC; TOPICAL at 12:57

## 2020-02-25 RX ADMIN — APIXABAN 5 MILLIGRAM(S): 2.5 TABLET, FILM COATED ORAL at 18:59

## 2020-02-25 RX ADMIN — Medication 25 MILLIGRAM(S): at 06:05

## 2020-02-25 RX ADMIN — Medication 1 APPLICATION(S): at 06:07

## 2020-02-25 RX ADMIN — Medication 500000 UNIT(S): at 12:56

## 2020-02-25 RX ADMIN — OXYCODONE HYDROCHLORIDE 5 MILLIGRAM(S): 5 TABLET ORAL at 13:09

## 2020-02-25 RX ADMIN — Medication 250 MILLIGRAM(S): at 06:05

## 2020-02-25 RX ADMIN — BRIMONIDINE TARTRATE 1 DROP(S): 2 SOLUTION/ DROPS OPHTHALMIC at 06:04

## 2020-02-25 RX ADMIN — BRIMONIDINE TARTRATE 1 DROP(S): 2 SOLUTION/ DROPS OPHTHALMIC at 21:55

## 2020-02-25 RX ADMIN — CHLORHEXIDINE GLUCONATE 1 APPLICATION(S): 213 SOLUTION TOPICAL at 13:00

## 2020-02-25 RX ADMIN — Medication 1 TABLET(S): at 13:01

## 2020-02-25 RX ADMIN — Medication 250 MILLIGRAM(S): at 18:58

## 2020-02-25 RX ADMIN — OXYCODONE HYDROCHLORIDE 10 MILLIGRAM(S): 5 TABLET ORAL at 08:57

## 2020-02-25 RX ADMIN — OXYCODONE HYDROCHLORIDE 5 MILLIGRAM(S): 5 TABLET ORAL at 14:00

## 2020-02-25 RX ADMIN — Medication 1 APPLICATION(S): at 13:10

## 2020-02-25 RX ADMIN — Medication 1 APPLICATION(S): at 22:01

## 2020-02-25 RX ADMIN — APIXABAN 5 MILLIGRAM(S): 2.5 TABLET, FILM COATED ORAL at 06:03

## 2020-02-25 RX ADMIN — OXYCODONE HYDROCHLORIDE 5 MILLIGRAM(S): 5 TABLET ORAL at 21:58

## 2020-02-25 RX ADMIN — OXYCODONE HYDROCHLORIDE 5 MILLIGRAM(S): 5 TABLET ORAL at 23:30

## 2020-02-25 RX ADMIN — Medication 500000 UNIT(S): at 06:05

## 2020-02-25 RX ADMIN — OXYCODONE HYDROCHLORIDE 10 MILLIGRAM(S): 5 TABLET ORAL at 09:30

## 2020-02-25 NOTE — PROGRESS NOTE ADULT - SUBJECTIVE AND OBJECTIVE BOX
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 Young, followed by Dr. Raphael, [facility] Oncologist), RA with hand contractures who presented to [facility] on 12/27/19 from dialysis for a fever. 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 His post op course c/b DGF requiring HD, thrombocytopenia, elevated LDH and Haptoglobulin. Schistocytes  on peripheral smear concerning for TMA. Envarsus held. Received Belatacept 12/13. Started on PLEX (12/12, 12/15). Underwent renal bx on 12/13 c/w profound ATN.  Found to have much improvement to levels, likely related to Tacrolimus. He was discharged home on 12/20/19 with outpatient HD.     Patient was now sent to [facility] ED on 12/17/19 by dialysis center after he was found to be febrile to 102, he did not get HD this AM, and last full HD session was on 12/26. Upon arrival, pt was found to have the flu and positive urine culture with Enterobacter. ID was consulted and pt was started on Tamiflu and Meropenam. Transplant team and Nephrology was following patient for HD. Pt was taken to the OR on 1/1 for evacuation of perinephretic hematoma which was infected with enterobacter and biopsy of transplanted kidney. Biopsy revealed ATN. Pt was recommended to continue on Meropenam as per ID. Pt also had a CT guided pelvic fluid aspiration by IR on 1/6. Post procedure pt had significant hematuria with clots for which urology was consulted. Pt placed on CBI and hematuria resolved. Pt had multiple antibiotics and antifungals added as per ID recommendations to due to rising WBC. Pt was taken back to the OR on 1/10 for evacuation of RP hematoma removal of external iliac artery stent, bovine pericardial patch repair of ext iliac artery, and ligation of external iliac vein. Pt was emergently taken to the OR after IR for explantation of transplanted kidney and repair of bladder perforation by transplant surgery. Vascular surgery called intra-op and repaired the right external iliac artery was repaired with a bovine pericardial patch, repaired the right external iliac vein, performed a thrombectomy of the right lower extremity veins and had an IVC filter placed. He had a renal US and IR guided drain and pigtail placed on 1/15. He had CT chest/abdomen/pelvis done on 1/21, which demonstrated persistent collection and so the IR pigtail drain was repositioned on 1/22.     His course was notable on 1/8 pt was found to have RLE DVT involving ext iliac, common fem veins, s/p thromobectomy and IVC filter placement, currently on treatment. Hospital course was further complicated by cardiac arrest on 1/13 due to massive acute saddle pulmonary embolism with acute cor pulmonale and acute DVT of the brachial vein in RUE. Pt was started on Eliquis. His course was complicated on 2/1 with AVF infiltrated during HD with aterial extravasation/bleeding to the left upper arm and chest seen on CTa. On 2/3, he had an arteriography, which demonstrated a tiny pseudoaneurysm arising from a distal branch of the left deep brachial artery and so permacath was placed on 2/11. He was also noted to have newly diagnosed afib and cardiology was following for medication management. He was also noted to have thrombocytosis and hematalogy-oncology work up was done with justin for outpatient follow up. He was also noted to have hyponatremia, which remained stable.    Patient medically stabilized and transferred to [facility] 2/14/20 for IRF services. (14 Feb 2020 12:48)              Review of Systems:   · Additional ROS	No acute events overnight. Patient reports that he is overall doing well. Pain controlled. Denies HA, dizziness, CP, SOB, abdominal pain, constipation. 	    Physical Exam:   · Constitutional	detailed exam	  · Constitutional Comments	alert O x 3.	  · Respiratory	detailed exam	  · Respiratory Details	breathing comfortably on room air, no increased work of breathing. CTA 	  · Respiratory Comments	fair effort, cath site clean chest wall right, no swelling or warmth no TTP	  · Cardiovascular	detailed exam	  · Cardiovascular Details	regular rate and rhythm	  · Cardiovascular Comments	warm, well perfused	  · Gastrointestinal	detailed exam	  · GI Normal	normal; soft; nontender, dressing to right lower quadrant C/D/I 	  · Extremities	detailed exam	  · Extremities Comments	BLE with ACE wraps in place good color distally, wiggle toes 1+ LE swelling RUE 1+ swelling Patient is a 49y old  Male who presents with a chief complaint of Debility (20 Feb 2020 19:07)      HPI:  49M with PMHx of ESRD on HD ( via LUE AVF), HTN, Gout, Glaucoma, 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 Young, followed by Dr. Raphael, [facility] Oncologist), RA with hand contractures who presented to [facility] on 12/27/19 from dialysis for a fever. 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 His post op course c/b DGF requiring HD, thrombocytopenia, elevated LDH and Haptoglobulin. Schistocytes  on peripheral smear concerning for TMA. Envarsus held. Received Belatacept 12/13. Started on PLEX (12/12, 12/15). Underwent renal bx on 12/13 c/w profound ATN.  Found to have much improvement to levels, likely related to Tacrolimus. He was discharged home on 12/20/19 with outpatient HD.     Patient was now sent to [facility] ED on 12/17/19 by dialysis center after he was found to be febrile to 102, he did not get HD this AM, and last full HD session was on 12/26. Upon arrival, pt was found to have the flu and positive urine culture with Enterobacter. ID was consulted and pt was started on Tamiflu and Meropenam. Transplant team and Nephrology was following patient for HD. Pt was taken to the OR on 1/1 for evacuation of perinephretic hematoma which was infected with enterobacter and biopsy of transplanted kidney. Biopsy revealed ATN. Pt was recommended to continue on Meropenam as per ID. Pt also had a CT guided pelvic fluid aspiration by IR on 1/6. Post procedure pt had significant hematuria with clots for which urology was consulted. Pt placed on CBI and hematuria resolved. Pt had multiple antibiotics and antifungals added as per ID recommendations to due to rising WBC. Pt was taken back to the OR on 1/10 for evacuation of RP hematoma removal of external iliac artery stent, bovine pericardial patch repair of ext iliac artery, and ligation of external iliac vein. Pt was emergently taken to the OR after IR for explantation of transplanted kidney and repair of bladder perforation by transplant surgery. Vascular surgery called intra-op and repaired the right external iliac artery was repaired with a bovine pericardial patch, repaired the right external iliac vein, performed a thrombectomy of the right lower extremity veins and had an IVC filter placed. He had a renal US and IR guided drain and pigtail placed on 1/15. He had CT chest/abdomen/pelvis done on 1/21, which demonstrated persistent collection and so the IR pigtail drain was repositioned on 1/22.     His course was notable on 1/8 pt was found to have RLE DVT involving ext iliac, common fem veins, s/p thromobectomy and IVC filter placement, currently on treatment. Hospital course was further complicated by cardiac arrest on 1/13 due to massive acute saddle pulmonary embolism with acute cor pulmonale and acute DVT of the brachial vein in RUE. Pt was started on Eliquis. His course was complicated on 2/1 with AVF infiltrated during HD with aterial extravasation/bleeding to the left upper arm and chest seen on CTa. On 2/3, he had an arteriography, which demonstrated a tiny pseudoaneurysm arising from a distal branch of the left deep brachial artery and so permacath was placed on 2/11. He was also noted to have newly diagnosed afib and cardiology was following for medication management. He was also noted to have thrombocytosis and hematalogy-oncology work up was done with plans for outpatient follow up. He was also noted to have hyponatremia, which remained stable.    Patient medically stabilized and transferred to [facility] 2/14/20 for IRF services. (14 Feb 2020 12:48)              Review of Systems:   · Additional ROS	No acute events overnight. Patient reports that he is overall doing well. Pain controlled except reports pain to the bottom of his feet after therapies (states that he is not using shoes and pain is from walking on the floor in socking feet). Denies HA, dizziness, CP, SOB, abdominal pain, constipation.  Nursing and patient report some bleeding from left axilla wound.	    Physical Exam:   · Constitutional	detailed exam	  · Constitutional Comments	alert O x 3.	  · Respiratory	detailed exam	  · Respiratory Details	breathing comfortably on room air, no increased work of breathing. CTA 	  · Respiratory Comments	fair effort, cath site clean chest wall right, no swelling or warmth no TTP	  · Cardiovascular	detailed exam	  · Cardiovascular Details	regular rate and rhythm	  · Cardiovascular Comments	warm, well perfused	  · Gastrointestinal	detailed exam	  · GI Normal	normal; soft; nontender, dressing to right lower quadrant C/D/I 	  · Extremities	detailed exam	  · Extremities Comments	BLE with ACE wraps in place good color distally, wiggle toes 1+ LE swelling RUE 1+ swelling	    Skin: wound to right groin healing well, filled in, epithelization tissue at wound margins. Scant drainage noted to old dressing.   Full thickness left axilla noted to have oozing of dark blood. There is an area to the 9 oclock position that tunnels down to about 2cm, rest of wound bed between 0.75 and 1 cm in depth.

## 2020-02-25 NOTE — PROGRESS NOTE ADULT - ASSESSMENT
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 Young, followed by Dr. 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 His course was complicated by RP hematoma and bladder perforation s/p repair and evacuation on 1/10, saddle PE s/p IVC filter on 1/10, acute DVT of RUE brachial vein, LUE AVF bleeding left deep brachial artery psuedoaneurysm, and cardiac arrest.     #Debility due to prolonged and complicated hospital course  -continue Comprehensive Rehab Program of PT/OT  -restorative dining added due to difficulty with container opening/hand use  -neuropsychology for support due to anxiety    #DDRT: c/b DGF/ATN/perinephric infected hematoma/pseudoneurysm/graft nephrectomy   - S/P AVF cannulation for HD c/b infiltration causing extravasation/bleeding to the left upper arm and chest noted on CTA  - S/P IR LUE arteriography showing a tiny pseudoaneurysm arising from a distal branch of the left deep brachial artery s/p dissection of a tiny feeder artery that resulted in resolution of the pseudoaneurysm.  - S/P IR permacath placement with TTS HD  - Eliquis 5mg BID started 2/12 and hold Aspirin    - Unable to continue with ciprofloxacin for abx course due to prolonged Qtc. ID consulted and completed one dose of amikacin 500mg after dialysis on 2/18 in order to complete abx course. Per ID, no need for additional antibiotics.  - pain control: Tylenol, Oxycodone. Neurontin discontinued on 2/15 due to prolonged QTc  - Wound care consult  appreciated 2/14: Recommendation: for the 2 wounds of the left upper arm area and the right groin area, suggest clean gently daily with normal saline, pat dry, then apply strip of aquacel ag, add few drops of normal saline as needed for aquacel to adhere to wound, cover with 2 inch gauze and paper tape, change daily.  - Thigh high compression stocking/ACE wrap to bilateral LE    - ACE wrap LUE from hand to shoulder  - Nephrology consult appreciated    #Saddle PE/multiple DVT: RUE brachial DVT,  R distal brachia vein , acute DVT of R external iliac, common femoral, femoral veins, R soleal vein   - S/p IVC filter on 1/9  - Apixaban 5mg BID  -monitor H/H, cardiopulmonary status      #New Afib/Vtach:  -Continue metoprolol  -Patient with prolonged QTC. Seen by hospitalist and cardiology 2/15:  ·	DC cipro and gabapentin  ·	serial ekgs-> 2/23 still prolonged QTC  Cardiology consulted and discontinued amiodarone     #Amiodarone toxicity: rule out per cardiology. LFTs stable. TSH 15  -Follow up CXR-possible fluid overload but might be bc patient was in dyalysis       #Elevated TSH  TSH 15  Total T3 66  Free thyroxine 1.5  T4 8.9   Per hospitalist repeat TSH in w week       #HTN:  -Continue metoprolol and hold on HD days per outside hospital    #ESRD: on HD  -Nephrology consult.on T/Th/Sat  -permacath care  -Epogen, nephrovite    #Glaucoma:  -Continue with drops    #Gout:  -Allopurinol    #RA:  -Continue with comprehensive rehab  -Continue with pain control as below  -rheumatology consult, appreciate recs    #Gas: improved   -simethicone prn    #Thrombocytosis:  -Monitor labs  -Hematology follow up as an outpatient    #Hyponatremia: Resolved  -Monitor labs    #Anemia: stable   -Continue with epogen  -Monitor labs    #Pain Mgmt   - Tylenol PRN, Oxycodone PRN. DC neurontin given QTc prolongation    #GI/Bowel Mgmt  -  Continent c/w  Senna,  Miralax  - Continue lactulose PRN    #/Bladder Mgmt -   HD    #FEN   - Diet - Regular + Thins, Renal Restrictions  - DC famotidine due to prolonged Qtc  -nutrition consult    Dispo:  Plan for discharge 3/3/20 with goals of min to mod assist. 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 Young, followed by Dr. 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 His course was complicated by RP hematoma and bladder perforation s/p repair and evacuation on 1/10, saddle PE s/p IVC filter on 1/10, acute DVT of RUE brachial vein, LUE AVF bleeding left deep brachial artery psuedoaneurysm, and cardiac arrest.     #Debility due to prolonged and complicated hospital course  -continue Comprehensive Rehab Program of PT/OT  -restorative dining added due to difficulty with container opening/hand use  -neuropsychology for support due to anxiety    #DDRT: c/b DGF/ATN/perinephric infected hematoma/pseudoneurysm/graft nephrectomy   - S/P AVF cannulation for HD c/b infiltration causing extravasation/bleeding to the left upper arm and chest noted on CTA  - S/P IR LUE arteriography showing a tiny pseudoaneurysm arising from a distal branch of the left deep brachial artery s/p dissection of a tiny feeder artery that resulted in resolution of the pseudoaneurysm.  - S/P IR permacath placement with TTS HD  - Eliquis 5mg BID started 2/12 and hold Aspirin    - Unable to continue with ciprofloxacin for abx course due to prolonged Qtc. ID consulted and completed one dose of amikacin 500mg after dialysis on 2/18 in order to complete abx course. Per ID, no need for additional antibiotics.  - pain control: Tylenol, Oxycodone. Neurontin discontinued on 2/15 due to prolonged QTc  - Wound care consult  appreciated 2/14: Recommendation: for the 2 wounds of the left upper arm area and the right groin area, suggest clean gently daily with normal saline, pat dry, then apply strip of aquacel ag, add few drops of normal saline as needed for aquacel to adhere to wound, cover with 2 inch gauze and paper tape, change daily.  -Discussed new depth and oozing of left axilla wound with Dr Polo. Agreed Dr Orta to be consulted to eval for new bleeding or just oozing from hematoma. Dressing orders placed for wet to dry q8hrs. If oozing becomes greater will need to increase frequency of dressing changes.   - Thigh high compression stocking/ACE wrap to bilateral LE    - ACE wrap LUE from hand to shoulder  - Nephrology consult appreciated    #Saddle PE/multiple DVT: RUE brachial DVT,  Right distal brachia vein , acute DVT of Right external iliac, common femoral, femoral veins, Right soleal vein   - S/p IVC filter on 1/9  - Apixaban 5mg BID  -monitor H/H, cardiopulmonary status      #New Afib/Vtach:  -Continue metoprolol  -Patient with prolonged QTC. Seen by hospitalist and cardiology 2/15:  ·	DC cipro and gabapentin  ·	serial ekgs-> 2/23 still prolonged QTC  Cardiology consulted and discontinued amiodarone     #Amiodarone toxicity: rule out per cardiology. LFTs stable. TSH 15  -Follow up CXR-possible fluid overload but might be bc patient was in dyalysis       #Elevated TSH  TSH 15  Total T3 66  Free thyroxine 1.5  T4 8.9   Per hospitalist repeat TSH in 1 week, ?reactive       #HTN:  -Continue metoprolol and hold on HD days per outside hospital    #ESRD: on HD  -Nephrology consult.on T/Th/Sat  -permacath care  -Epogen, nephrovite    #Glaucoma:  -Continue with drops    #Gout:  -Allopurinol    #RA:  -Continue with comprehensive rehab  -Continue with pain control as below  -rheumatology consult, appreciate recs    #Gas: improved   -simethicone prn    #Thrombocytosis:  -Monitor labs  -Hematology follow up as an outpatient    #Hyponatremia: Resolved  -Monitor labs    #Anemia: stable   -Continue with epogen  -Monitor labs    #Pain Mgmt   - Tylenol PRN, Oxycodone PRN. DC neurontin given QTc prolongation    #GI/Bowel Mgmt  -  Continent c/w  Senna,  Miralax  - Continue lactulose PRN    #/Bladder Mgmt -   HD    #FEN   - Diet - Regular + Thins, Renal Restrictions  - DC famotidine due to prolonged Qtc  -nutrition consult    Dispo:  Plan for discharge 3/3/20 with goals of min to mod assist.

## 2020-02-25 NOTE — PROGRESS NOTE ADULT - SUBJECTIVE AND OBJECTIVE BOX
[facility] NEPHROLOGY SERVICES, [facility]  NEPHROLOGY AND HYPERTENSION  [address]  [address]  [place] [address]  [phone]    MD GIANA GUERRERO, MD BEAN ARAUJO, MD JANAE VICTORIA, MD BARBARA MONET, MD DEANN GORDON, MD SALUD GENTILE, MD CATRINA WOO, MD    Seen on HD    Vital Signs Last 24 Hrs  T(C): 36.3 (02-25-20 @ 08:09), Max: 36.9 (02-24-20 @ 20:59)  T(F): 97.3 (02-25-20 @ 08:09), Max: 98.4 (02-24-20 @ 20:59)  HR: 60 (02-25-20 @ 08:09) (60 - 93)  BP: 149/78 (02-25-20 @ 08:09) (142/74 - 162/88)  RR: 14 (02-25-20 @ 08:09) (14 - 14)  SpO2: 94% (02-25-20 @ 08:09) (94% - 99%)    Respiratory: decreased BS at bases  Cardiovascular: S1 S2  Gastrointestinal: soft NT ND +BS  Extremities: edema improving  AO                        8.5    10.64 )-----------( 432      ( 25 Feb 2020 14:45 )             25.5     25 Feb 2020 14:45    137    |  98     |  40     ----------------------------<  96     4.3     |  29     |  6.56     Ca    8.6        25 Feb 2020 14:45  Phos  5.5       25 Feb 2020 14:45    TPro  x      /  Alb  2.3    /  TBili  x      /  DBili  x      /  AST  x      /  ALT  x      /  AlkPhos  x      25 Feb 2020 14:45    LIVER FUNCTIONS - ( 25 Feb 2020 14:45 )  Alb: 2.3 g/dL / Pro: x     / ALK PHOS: x     / ALT: x     / AST: x     / GGT: x           acetaminophen   Tablet .. 975 milliGRAM(s) Oral every 6 hours PRN  allopurinol 100 milliGRAM(s) Oral daily  apixaban 5 milliGRAM(s) Oral two times a day  AQUAPHOR (petrolatum Ointment) 1 Application(s) Topical three times a day  benzocaine 15 mG/menthol 3.6 mG (Sugar-Free) Lozenge 1 Lozenge Oral three times a day PRN  bisacodyl Suppository 10 milliGRAM(s) Rectal daily PRN  brimonidine 0.2% Ophthalmic Solution 1 Drop(s) Both EYES three times a day  chlorhexidine 4% Liquid 1 Application(s) Topical daily  epoetin trey Injectable 68895 Unit(s) IV Push <User Schedule>  lactulose Syrup 10 Gram(s) Oral daily PRN  latanoprost 0.005% Ophthalmic Solution 1 Drop(s) Both EYES daily  metoprolol tartrate 25 milliGRAM(s) Oral every 12 hours  Nephro-candace 1 Tablet(s) Oral daily  nystatin    Suspension 749122 Unit(s) Oral four times a day  oxyCODONE    IR 5 milliGRAM(s) Oral every 4 hours PRN  oxyCODONE    IR 10 milliGRAM(s) Oral every 4 hours PRN  polyethylene glycol 3350 17 Gram(s) Oral daily  saccharomyces boulardii 250 milliGRAM(s) Oral two times a day  senna 2 Tablet(s) Oral at bedtime  simethicone 80 milliGRAM(s) Chew three times a day PRN           Assessment/Plan:    ESRD on HD  S/p DDRT at [facility] 12/8/19, DGF  S/p transplant kidney bx 12/13: ATN, focal TMA  S/p PLEX 12/13, 12/15; d/c-d 12/20 w/op HD 3 x wk  Re-adm 12/27 to [facility] w/Flu A, infected carlos-nephric hematoma   S/p OR 1/1 for washout and repeat renal graft biopsy (ATN)  Dx w/RLE DVT  S/p OR 1/10 for infected carlos-nephric hematoma, hemorrhage involving pseudo-aneurysm of anastomosis of renal artery to external iliac vein,   s/p transplant nephrectomy, s/p IVCF   S/p PEA arrest 1/13, + PE, R retroperitoneal collection  S/p IR drainage of abd collection  RUE DVT, AC  AVF infiltration 2/1 w/hematoma extending to L chest wall while on Eliquis, s/p PRBCs  S/p perm cath  Tx to acute rehab 2/14  S/p daily HD last week for volume overload  Improved, HD TTS  TMP as able  Epogen  Will follow    [phone]

## 2020-02-25 NOTE — PROGRESS NOTE ADULT - ASSESSMENT
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 His course was further complicated by being cardiac arrest in setting of saddle PE. Additionally he was noted to have LUE AVF bleeding due to brachial artery pseudoaneurysm that underwent repair. Additionally he was noted to have an acute DVT of the RUE brachial vein as well. He is currently admitted to [facility] for acute rehabilitation of his functional deficits.  Rehab course complicated by prolongation of his QTc- PT/OT, DVT PPX    DVT/PE- eliquis ,s/p IVC filter    Atrial Fibrillation -  amiodarone was dced ,metoprolol , Eliquis    Hypertension -metoprolol ,QT prolongation suspect medication induced, CARDIO CONSULT NOTED    Elevated TSH - REPEAT IN 1 WEEK     ESRD on HD (normally T-Th-Sat)- Nephrology following - renal diet    ANEMIA- epogen with HD    Perinephritic Abscess - cipro was dc/ed due to QTC prolongation , ID RECC NOTED    Gout- allopurinol    Leukocytosis - trending down, likely in setting of resolving infection    Thrombocytosis- likely reactive    LUE Hematoma- continue to monitor closely given anticoagulation usage, surgery recommendations appreciated    DVT prophylaxis: Eliquis    will follow

## 2020-02-25 NOTE — PROGRESS NOTE ADULT - SUBJECTIVE AND OBJECTIVE BOX
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 His course was further complicated by being cardiac arrest in setting of saddle PE. Additionally he was noted to have LUE AVF bleeding due to brachial artery pseudoaneurysm that underwent repair. Additionally he was noted to have an acute DVT of the RUE brachial vein    Patient seen and examined at bedside. No overnight events , feeling well. no n/v, no sob      Vital Signs Last 24 Hrs  T(C): 36.3 (25 Feb 2020 08:09), Max: 36.9 (24 Feb 2020 20:59)  T(F): 97.3 (25 Feb 2020 08:09), Max: 98.4 (24 Feb 2020 20:59)  HR: 60 (25 Feb 2020 08:09) (60 - 93)  BP: 149/78 (25 Feb 2020 08:09) (142/74 - 162/88)  BP(mean): --  RR: 14 (25 Feb 2020 08:09) (14 - 14)  SpO2: 94% (25 Feb 2020 08:09) (94% - 99%)    GENERAL- NAD  EAR/NOSE/MOUTH/THROAT - no pharyngeal exudates, no oral leisions,  MMM  EYES- JUAN, conjunctiva and Sclera clear  NECK- supple  RESPIRATORY-  clear to auscultation bilaterally  CARDIOVASCULAR - SIS2, RRR  GI - soft NT BS present, left sided wound dressing intact  EXTREMITIES- b/l LE edema  NEUROLOGY- B /L LE WEAKNESS  PSYCHIATRY- AAO X 3  MUSCULOSKELETAL- ROM RESTRICTED TO B/L LE       MEDICATIONS  (STANDING):  allopurinol 100 milliGRAM(s) Oral daily  apixaban 5 milliGRAM(s) Oral two times a day  AQUAPHOR (petrolatum Ointment) 1 Application(s) Topical three times a day  brimonidine 0.2% Ophthalmic Solution 1 Drop(s) Both EYES three times a day  chlorhexidine 4% Liquid 1 Application(s) Topical daily  epoetin trey Injectable 12539 Unit(s) IV Push <User Schedule>  latanoprost 0.005% Ophthalmic Solution 1 Drop(s) Both EYES daily  metoprolol tartrate 25 milliGRAM(s) Oral every 12 hours  Nephro-candace 1 Tablet(s) Oral daily  nystatin    Suspension 158893 Unit(s) Oral four times a day  polyethylene glycol 3350 17 Gram(s) Oral daily  saccharomyces boulardii 250 milliGRAM(s) Oral two times a day  senna 2 Tablet(s) Oral at bedtime

## 2020-02-25 NOTE — CHART NOTE - NSCHARTNOTEFT_GEN_A_CORE
NUTRITION FOLLOW UP    SOURCE: Patient [X]   Family [ ]    Medical Record (X)    DIET: Renal, (no protein restriction)  PATIENT REPORT: no GI distress  PO INTAKE:  < 50% [ ]   50-75%  [ ]   %  [X]     CURRENT WEIGHT:  169lb/76.7kg (2/25)  181.8lb/82.6kg (2/19) post dialysis  188.4lb/85.6kg (2/19) pre dialysis  201lb/91.2kg (2/14)    PO intake is 80% as per patient. Patient has lost 7.1% of weight in six days. Denies nausea or vomiting. Reweigh on next dialysis to determine if patient needs further nutrition intervention. Patient was offered packet on renal diet. He was familiar with the diet because he has been on dialysis for over five years.    PERTINENT MEDS:   Pertinent Medications: MEDICATIONS  (STANDING):  allopurinol 100 milliGRAM(s) Oral daily  apixaban 5 milliGRAM(s) Oral two times a day  AQUAPHOR (petrolatum Ointment) 1 Application(s) Topical three times a day  brimonidine 0.2% Ophthalmic Solution 1 Drop(s) Both EYES three times a day  chlorhexidine 4% Liquid 1 Application(s) Topical daily  epoetin trey Injectable 34618 Unit(s) IV Push <User Schedule>  latanoprost 0.005% Ophthalmic Solution 1 Drop(s) Both EYES daily  metoprolol tartrate 25 milliGRAM(s) Oral every 12 hours  Nephro-candace 1 Tablet(s) Oral daily  nystatin    Suspension 769163 Unit(s) Oral four times a day  polyethylene glycol 3350 17 Gram(s) Oral daily  saccharomyces boulardii 250 milliGRAM(s) Oral two times a day  senna 2 Tablet(s) Oral at bedtime    MEDICATIONS  (PRN):  acetaminophen   Tablet .. 975 milliGRAM(s) Oral every 6 hours PRN Mild Pain (1 - 3)  benzocaine 15 mG/menthol 3.6 mG (Sugar-Free) Lozenge 1 Lozenge Oral three times a day PRN Sore Throat  bisacodyl Suppository 10 milliGRAM(s) Rectal daily PRN Constipation  lactulose Syrup 10 Gram(s) Oral daily PRN constipation  oxyCODONE    IR 5 milliGRAM(s) Oral every 4 hours PRN Moderate Pain (4 - 6)  oxyCODONE    IR 10 milliGRAM(s) Oral every 4 hours PRN Severe Pain (7 - 10)  simethicone 80 milliGRAM(s) Chew three times a day PRN Gas      PERTINENT LABS:   02-22 Phos 4.0 mg/dL 02-22 Alb 2.2 g/dL<L>    SKIN: Wound: left upper arm, posterior axilla, bilateral anterior foot   EDEMA: +2 bilateral leg  LAST BM: 2/22 (as per patient)  As per flow sheet    ESTIMATED NEEDS:   [X] no change since previous assessment    PREVIOUS NUTRITION DIAGNOSIS:  Inadequate oral intake  NUTRITION DIAGNOSIS is :  ( X )  Ongoing      NEW NUTRITION DIAGNOSIS: N/A     NUTRITION RECOMMENDATIONS:   1. Patient to continue diet order. Consider Nepro.  2. Reweigh patient pre and post-dialysis to determine weight status.     MONITORING AND EVALUATION:   1. Tolerance to diet prescription   2. PO intake  3. Weights  4. Labs  5. Follow Up per protocol     RD to remain available     Kal Raymundo, Dietetic Intern NUTRITION FOLLOW UP    SOURCE: Patient [X]   Family [ ]    Medical Record (X)    DIET: Renal, (no protein restriction)  PATIENT REPORT: no GI distress  PO INTAKE:  < 50% [ ]   50-75%  [ ]   %  [X]     CURRENT WEIGHT:  169lb/76.7kg (2/25)  181.8lb/82.6kg (2/19) post dialysis  188.4lb/85.6kg (2/19) pre dialysis  201lb/91.2kg (2/14)    PO intake is 80% as per patient. Patient has lost 7.1% of weight in six days. Denies nausea or vomiting. Reweigh on next dialysis to determine if patient needs further nutrition intervention. Patient was offered packet on renal diet. He was familiar with the diet because he has been on dialysis for over five years.    PERTINENT MEDS:   Pertinent Medications: MEDICATIONS  (STANDING):  allopurinol 100 milliGRAM(s) Oral daily  apixaban 5 milliGRAM(s) Oral two times a day  AQUAPHOR (petrolatum Ointment) 1 Application(s) Topical three times a day  brimonidine 0.2% Ophthalmic Solution 1 Drop(s) Both EYES three times a day  chlorhexidine 4% Liquid 1 Application(s) Topical daily  epoetin trey Injectable 25007 Unit(s) IV Push <User Schedule>  latanoprost 0.005% Ophthalmic Solution 1 Drop(s) Both EYES daily  metoprolol tartrate 25 milliGRAM(s) Oral every 12 hours  Nephro-candace 1 Tablet(s) Oral daily  nystatin    Suspension 600975 Unit(s) Oral four times a day  polyethylene glycol 3350 17 Gram(s) Oral daily  saccharomyces boulardii 250 milliGRAM(s) Oral two times a day  senna 2 Tablet(s) Oral at bedtime    MEDICATIONS  (PRN):  acetaminophen   Tablet .. 975 milliGRAM(s) Oral every 6 hours PRN Mild Pain (1 - 3)  benzocaine 15 mG/menthol 3.6 mG (Sugar-Free) Lozenge 1 Lozenge Oral three times a day PRN Sore Throat  bisacodyl Suppository 10 milliGRAM(s) Rectal daily PRN Constipation  lactulose Syrup 10 Gram(s) Oral daily PRN constipation  oxyCODONE    IR 5 milliGRAM(s) Oral every 4 hours PRN Moderate Pain (4 - 6)  oxyCODONE    IR 10 milliGRAM(s) Oral every 4 hours PRN Severe Pain (7 - 10)  simethicone 80 milliGRAM(s) Chew three times a day PRN Gas      PERTINENT LABS:   02-22 Phos 4.0 mg/dL 02-22 Alb 2.2 g/dL<L>    SKIN: Wound: left upper arm, posterior axilla, bilateral anterior foot   EDEMA: +2 bilateral leg  LAST BM: 2/22 (as per patient)  As per flow sheet    ESTIMATED NEEDS:   [X] no change since previous assessment    PREVIOUS NUTRITION DIAGNOSIS:  Inadequate oral intake  NUTRITION DIAGNOSIS is :  ( X )  Ongoing      NEW NUTRITION DIAGNOSIS: N/A     NUTRITION RECOMMENDATIONS:   1. Patient to meet >75% of nutritional needs appropriate for renal diet.   2. Maintain body weight until discharge.  3. Patient to continue diet order. Consider Nepro.  4. Reweigh patient pre and post-dialysis to determine weight status.     MONITORING AND EVALUATION:   1. Tolerance to diet prescription   2. PO intake  3. Weights  4. Labs  5. Follow Up per protocol     RD to remain available     Kal Raymundo, Dietetic Intern

## 2020-02-26 PROCEDURE — 93010 ELECTROCARDIOGRAM REPORT: CPT

## 2020-02-26 PROCEDURE — 99233 SBSQ HOSP IP/OBS HIGH 50: CPT

## 2020-02-26 PROCEDURE — 99232 SBSQ HOSP IP/OBS MODERATE 35: CPT | Mod: GC

## 2020-02-26 RX ORDER — HYDROCORTISONE 1 %
1 OINTMENT (GRAM) TOPICAL EVERY 12 HOURS
Refills: 0 | Status: DISCONTINUED | OUTPATIENT
Start: 2020-02-26 | End: 2020-03-03

## 2020-02-26 RX ORDER — OXYCODONE HYDROCHLORIDE 5 MG/1
5 TABLET ORAL EVERY 4 HOURS
Refills: 0 | Status: DISCONTINUED | OUTPATIENT
Start: 2020-02-28 | End: 2020-03-03

## 2020-02-26 RX ORDER — LIDOCAINE 4 G/100G
1 CREAM TOPICAL
Refills: 0 | Status: DISCONTINUED | OUTPATIENT
Start: 2020-02-26 | End: 2020-03-03

## 2020-02-26 RX ORDER — OXYCODONE HYDROCHLORIDE 5 MG/1
10 TABLET ORAL EVERY 4 HOURS
Refills: 0 | Status: DISCONTINUED | OUTPATIENT
Start: 2020-02-28 | End: 2020-03-03

## 2020-02-26 RX ADMIN — BRIMONIDINE TARTRATE 1 DROP(S): 2 SOLUTION/ DROPS OPHTHALMIC at 13:14

## 2020-02-26 RX ADMIN — Medication 500000 UNIT(S): at 13:13

## 2020-02-26 RX ADMIN — Medication 1 TABLET(S): at 13:23

## 2020-02-26 RX ADMIN — POLYETHYLENE GLYCOL 3350 17 GRAM(S): 17 POWDER, FOR SOLUTION ORAL at 13:21

## 2020-02-26 RX ADMIN — Medication 500000 UNIT(S): at 17:37

## 2020-02-26 RX ADMIN — APIXABAN 5 MILLIGRAM(S): 2.5 TABLET, FILM COATED ORAL at 17:38

## 2020-02-26 RX ADMIN — Medication 250 MILLIGRAM(S): at 17:37

## 2020-02-26 RX ADMIN — Medication 25 MILLIGRAM(S): at 17:37

## 2020-02-26 RX ADMIN — OXYCODONE HYDROCHLORIDE 10 MILLIGRAM(S): 5 TABLET ORAL at 11:00

## 2020-02-26 RX ADMIN — Medication 1 APPLICATION(S): at 13:21

## 2020-02-26 RX ADMIN — Medication 1 APPLICATION(S): at 05:25

## 2020-02-26 RX ADMIN — OXYCODONE HYDROCHLORIDE 5 MILLIGRAM(S): 5 TABLET ORAL at 06:59

## 2020-02-26 RX ADMIN — Medication 1 APPLICATION(S): at 02:53

## 2020-02-26 RX ADMIN — OXYCODONE HYDROCHLORIDE 10 MILLIGRAM(S): 5 TABLET ORAL at 10:09

## 2020-02-26 RX ADMIN — Medication 1 APPLICATION(S): at 21:55

## 2020-02-26 RX ADMIN — OXYCODONE HYDROCHLORIDE 5 MILLIGRAM(S): 5 TABLET ORAL at 18:24

## 2020-02-26 RX ADMIN — Medication 500000 UNIT(S): at 07:00

## 2020-02-26 RX ADMIN — APIXABAN 5 MILLIGRAM(S): 2.5 TABLET, FILM COATED ORAL at 05:24

## 2020-02-26 RX ADMIN — Medication 250 MILLIGRAM(S): at 05:24

## 2020-02-26 RX ADMIN — Medication 25 MILLIGRAM(S): at 05:25

## 2020-02-26 RX ADMIN — OXYCODONE HYDROCHLORIDE 5 MILLIGRAM(S): 5 TABLET ORAL at 05:27

## 2020-02-26 RX ADMIN — BRIMONIDINE TARTRATE 1 DROP(S): 2 SOLUTION/ DROPS OPHTHALMIC at 22:21

## 2020-02-26 RX ADMIN — Medication 100 MILLIGRAM(S): at 13:14

## 2020-02-26 RX ADMIN — CHLORHEXIDINE GLUCONATE 1 APPLICATION(S): 213 SOLUTION TOPICAL at 21:54

## 2020-02-26 RX ADMIN — BRIMONIDINE TARTRATE 1 DROP(S): 2 SOLUTION/ DROPS OPHTHALMIC at 05:25

## 2020-02-26 RX ADMIN — OXYCODONE HYDROCHLORIDE 5 MILLIGRAM(S): 5 TABLET ORAL at 19:00

## 2020-02-26 NOTE — PROGRESS NOTE ADULT - SUBJECTIVE AND OBJECTIVE BOX
Patient is a 49y old  Male who presents with a chief complaint of Debility (20 Feb 2020 19:07)          Review of Systems:   · Additional ROS	No acute events overnight. Patient reports that he is overall doing well. Tolerating therapies.  Pain controlled except reports pain to the bottom of his feet and toes. Pain still bothersome after therapies and with palpation, not improving (states that he is not using shoes and pain feels that it is from walking on the floor in socking feet).Decribes pain as "sharp" but unable to further describe how the pain feels. Denies HA, dizziness, CP, SOB, abdominal pain, constipation.   	    Physical Exam:   · Constitutional	detailed exam	  · Constitutional Comments	alert O x 3.	  · Respiratory	detailed exam	  · Respiratory Details	breathing comfortably on room air, no increased work of breathing. CTA 	  · Respiratory Comments	fair effort, cath site clean chest wall right, no swelling or warmth no TTP	  · Cardiovascular	detailed exam	  · Cardiovascular Details	regular rate and rhythm	  · Cardiovascular Comments	warm, well perfused	  · Gastrointestinal	detailed exam	  · GI Normal	normal; soft; nontender, dressing to right lower quadrant C/D/I 	  · Extremities	detailed exam	  · Extremities Comments	BLE with ACE wraps in place good color distally, wiggle toes 1+ LE swelling RUE 1+ swelling Pain with palpation to bottom of feet and dorsum aspect of toes bilaterally  Long sharp/jagged toe nails noted bilat	    Functional Status:   CG to min A with RW for transfers   navigating 4 steps with 2 rails and mod A   upper body dressing supervision   lower body dressing min A

## 2020-02-26 NOTE — PROGRESS NOTE ADULT - SUBJECTIVE AND OBJECTIVE BOX
Patient is a 49 year old male who presents with a chief complaint of debility (26 Feb 2020 11:30)    Patient seen and examined.     MEDICATIONS  (STANDING):  allopurinol 100 milliGRAM(s) Oral daily  apixaban 5 milliGRAM(s) Oral two times a day  AQUAPHOR (petrolatum Ointment) 1 Application(s) Topical three times a day  brimonidine 0.2% Ophthalmic Solution 1 Drop(s) Both EYES three times a day  chlorhexidine 4% Liquid 1 Application(s) Topical daily  epoetin trey Injectable 35110 Unit(s) IV Push <User Schedule>  latanoprost 0.005% Ophthalmic Solution 1 Drop(s) Both EYES daily  lidocaine 2% Gel 1 Application(s) Topical two times a day  metoprolol tartrate 25 milliGRAM(s) Oral every 12 hours  Nephro-candace 1 Tablet(s) Oral daily  nystatin    Suspension 414588 Unit(s) Oral four times a day  polyethylene glycol 3350 17 Gram(s) Oral daily  saccharomyces boulardii 250 milliGRAM(s) Oral two times a day  senna 2 Tablet(s) Oral at bedtime    MEDICATIONS  (PRN):  acetaminophen   Tablet .. 975 milliGRAM(s) Oral every 6 hours PRN Mild Pain (1 - 3)  benzocaine 15 mG/menthol 3.6 mG (Sugar-Free) Lozenge 1 Lozenge Oral three times a day PRN Sore Throat  bisacodyl Suppository 10 milliGRAM(s) Rectal daily PRN Constipation  hydrocortisone 2.5% Lotion 1 Application(s) Topical every 12 hours PRN Rash and/or Itching  lactulose Syrup 10 Gram(s) Oral daily PRN constipation  oxyCODONE    IR 5 milliGRAM(s) Oral every 4 hours PRN Moderate Pain (4 - 6)  oxyCODONE    IR 10 milliGRAM(s) Oral every 4 hours PRN Severe Pain (7 - 10)  simethicone 80 milliGRAM(s) Chew three times a day PRN Gas      REVIEW OF SYSTEMS:  CONSTITUTIONAL: No fever, weight loss, has fatigue  EYES: No eye pain, visual disturbances, or discharge  ENMT:  No difficulty hearing, tinnitus, vertigo; No sinus or throat pain  NECK: No pain or stiffness  RESPIRATORY: No cough, wheezing, chills or hemoptysis; No shortness of breath  CARDIOVASCULAR: No chest pain, palpitations, dizziness, or leg swelling  GASTROINTESTINAL: No abdominal or epigastric pain. No nausea, vomiting, or hematemesis; No diarrhea or constipation. No melena or hematochezia.  GENITOURINARY: No dysuria, frequency, hematuria, or incontinence  NEUROLOGICAL: No headaches, memory loss, loss of strength, numbness, or tremors  SKIN: No itching, burning, rashes, or lesions   ENDOCRINE: No heat or cold intolerance; No hair loss  MUSCULOSKELETAL: No joint pain or swelling; No muscle, back, or extremity pain  PSYCHIATRIC: No depression, anxiety, mood swings, or difficulty sleeping  HEME/LYMPH: No easy bruising, or bleeding gums  ALLERGY AND IMMUNOLOGIC: No hives or eczema    PHYSICAL EXAM:  T(C): 36.4 (02-26-20 @ 09:28), Max: 37 (02-25-20 @ 15:10)  HR: 85 (02-26-20 @ 09:28) (61 - 85)  BP: 121/73 (02-26-20 @ 09:28) (108/65 - 129/60)  RR: 12 (02-26-20 @ 09:28) (12 - 18)  SpO2: 98% (02-26-20 @ 09:28) (95% - 100%)      GENERAL: NAD  HEAD:  Atraumatic, Normocephalic  EYES: EOMI, PERRL, conjunctiva and sclera clear  ENMT: moist mucous membranes  NECK: Supple, No JVD, Normal thyroid  NERVOUS SYSTEM:  Alert & Oriented X3, Good concentration; Motor Strength 5/5 B/L upper and lower extremities; DTRs 2+ intact and symmetric  CHEST/LUNG: Clear to ascultation bilaterally; No rales, rhonchi, wheezing, or rubs  HEART: Regular rate and rhythm; No murmurs, rubs, or gallops  ABDOMEN: Soft, Nontender, Nondistended; Bowel sounds present  EXTREMITIES:  2+ Peripheral Pulses, No clubbing, cyanosis, or edema  SKIN: No rash    LABS:                        8.5    10.64 )-----------( 432      ( 25 Feb 2020 14:45 )             25.5     02-25    137  |  98  |  40<H>  ----------------------------<  96  4.3   |  29  |  6.56<H>    Ca    8.6      25 Feb 2020 14:45  Phos  5.5     02-25    TPro  x   /  Alb  2.3<L>  /  TBili  x   /  DBili  x   /  AST  x   /  ALT  x   /  AlkPhos  x   02-25          RADIOLOGY & ADDITIONAL TESTS:    Imaging Reviewed:  [x] YES  [ ] NO    Consultant(s) Notes Reviewed:  [x] YES  [ ] NO    Care Discussed with Consultants/Other Providers [x] YES  [ ] NO

## 2020-02-26 NOTE — PROGRESS NOTE ADULT - SUBJECTIVE AND OBJECTIVE BOX
[facility] NEPHROLOGY SERVICES, [facility]  NEPHROLOGY AND HYPERTENSION  [address]  [address]  [place] [address]  [phone]    MD GIANA GUERRERO, MD BEAN ARAUJO, MD JANAE VICTORIA, MD BARBARA MONET, MD DEANN GORDON, MD SALUD GENTILE, MD CATRINA WOO, MD    No distress    Vital Signs Last 24 Hrs  T(C): 36.4 (02-26-20 @ 09:28), Max: 36.4 (02-26-20 @ 09:28)  T(F): 97.5 (02-26-20 @ 09:28), Max: 97.5 (02-26-20 @ 09:28)  HR: 64 (02-26-20 @ 17:47) (62 - 85)  BP: 137/81 (02-26-20 @ 17:47) (121/73 - 137/81)  RR: 12 (02-26-20 @ 09:28) (12 - 14)  SpO2: 98% (02-26-20 @ 09:28) (98% - 100%)    Respiratory: decreased BS at bases  Cardiovascular: S1 S2  Gastrointestinal: soft NT ND +BS  Extremities: edema improving  AO                                8.5    10.64 )-----------( 432      ( 25 Feb 2020 14:45 )             25.5     25 Feb 2020 14:45    137    |  98     |  40     ----------------------------<  96     4.3     |  29     |  6.56     Ca    8.6        25 Feb 2020 14:45  Phos  5.5       25 Feb 2020 14:45    TPro  x      /  Alb  2.3    /  TBili  x      /  DBili  x      /  AST  x      /  ALT  x      /  AlkPhos  x      25 Feb 2020 14:45    LIVER FUNCTIONS - ( 25 Feb 2020 14:45 )  Alb: 2.3 g/dL / Pro: x     / ALK PHOS: x     / ALT: x     / AST: x     / GGT: x           acetaminophen   Tablet .. 975 milliGRAM(s) Oral every 6 hours PRN  allopurinol 100 milliGRAM(s) Oral daily  apixaban 5 milliGRAM(s) Oral two times a day  AQUAPHOR (petrolatum Ointment) 1 Application(s) Topical three times a day  benzocaine 15 mG/menthol 3.6 mG (Sugar-Free) Lozenge 1 Lozenge Oral three times a day PRN  bisacodyl Suppository 10 milliGRAM(s) Rectal daily PRN  brimonidine 0.2% Ophthalmic Solution 1 Drop(s) Both EYES three times a day  chlorhexidine 4% Liquid 1 Application(s) Topical daily  epoetin trey Injectable 34668 Unit(s) IV Push <User Schedule>  hydrocortisone 2.5% Lotion 1 Application(s) Topical every 12 hours PRN  lactulose Syrup 10 Gram(s) Oral daily PRN  latanoprost 0.005% Ophthalmic Solution 1 Drop(s) Both EYES daily  lidocaine 2% Gel 1 Application(s) Topical two times a day  metoprolol tartrate 25 milliGRAM(s) Oral every 12 hours  Nephro-candace 1 Tablet(s) Oral daily  nystatin    Suspension 836130 Unit(s) Oral four times a day  oxyCODONE    IR 5 milliGRAM(s) Oral every 4 hours PRN  oxyCODONE    IR 10 milliGRAM(s) Oral every 4 hours PRN  polyethylene glycol 3350 17 Gram(s) Oral daily  saccharomyces boulardii 250 milliGRAM(s) Oral two times a day  senna 2 Tablet(s) Oral at bedtime  simethicone 80 milliGRAM(s) Chew three times a day PRN     Assessment/Plan:    ESRD on HD  S/p DDRT at [facility] 12/8/19, DGF  S/p transplant kidney bx 12/13: ATN, focal TMA  S/p PLEX 12/13, 12/15; d/c-d 12/20 w/op HD 3 x wk  Re-adm 12/27 to [facility] w/Flu A, infected carlos-nephric hematoma   S/p OR 1/1 for washout and repeat renal graft biopsy (ATN)  Dx w/RLE DVT  S/p OR 1/10 for infected carlos-nephric hematoma, hemorrhage involving pseudo-aneurysm of anastomosis of renal artery to external iliac vein,   s/p transplant nephrectomy, s/p IVCF   S/p PEA arrest 1/13, + PE, R retroperitoneal collection  S/p IR drainage of abd collection  RUE DVT, AC  AVF infiltration 2/1 w/hematoma extending to L chest wall while on Eliquis, s/p PRBCs  S/p perm cath  Tx to acute rehab 2/14  S/p daily HD last week for volume overload  Improved, HD TTS  TMP as able  Epogen  Will follow    [phone]

## 2020-02-26 NOTE — PROGRESS NOTE ADULT - SUBJECTIVE AND OBJECTIVE BOX
(  ) No complaints (  ) Complains of:     T(F): 98.3 (02-25-20 @ 20:03), Max: 98.6 (02-25-20 @ 15:10)  HR: 62 (02-26-20 @ 05:30) (60 - 68)  BP: 128/71 (02-26-20 @ 05:30) (108/65 - 149/78)  RR: 14 (02-26-20 @ 05:30) (14 - 18)  SpO2: 100% (02-26-20 @ 05:30) (94% - 100%)        Wound Location 1:  tunneling wound very close to hematoma left posterior axillary line which moderate discharge that appears to be old blood       Wound location 2       Wound location 3       Wound location 4         Wound description:   (  ) clean  (  ) granulating  (  ) eschar   (  ) necrotic  (  ) fibrin  (  ) other:     Wound size  Length:   Width:   Depth:  [  ] Tunneling:  (  ) 1 o'clock (  ) 2 o'clock (  ) 3 o'clock (  ) 4 o'clock (  ) 5 o'clock (  ) 6 o' clock (  ) 7 o'clock (  ) 8 o'clock (  ) 9 o'clock  (  ) 10 o'clock (  ) 11 o' clock (  ) 12 o'clock    [  ] Undermining: (  ) 1 o'clock (  ) 2 o'clock (  ) 3 o'clock (  ) 4 o'clock (  ) 5 o'clock (  ) 6 o' clock (  ) 7 o'clock (  ) 8 o'clock (  ) 9 o'clock  (  ) 10 o'clock (  ) 11 o' clock (  ) 12 o'clock    [  ] Drainage: (  ) serous (  ) sero-sanguinous (  ) sanguinous (  ) purulent (  ) cloudy (  ) clear  (  ) other:                           8.5    10.64 )-----------( 432      ( 25 Feb 2020 14:45 )             25.5     CBC Full  -  ( 25 Feb 2020 14:45 )  WBC Count : 10.64 K/uL  RBC Count : 2.85 M/uL  Hemoglobin : 8.5 g/dL  Hematocrit : 25.5 %  Platelet Count - Automated : 432 K/uL  Mean Cell Volume : 89.5 fl  Mean Cell Hemoglobin : 29.8 pg  Mean Cell Hemoglobin Concentration : 33.3 gm/dL  Auto Neutrophil # : x  Auto Lymphocyte # : x  Auto Monocyte # : x  Auto Eosinophil # : x  Auto Basophil # : x  Auto Neutrophil % : x  Auto Lymphocyte % : x  Auto Monocyte % : x  Auto Eosinophil % : x  Auto Basophil % : x    137|98|40<96  4.3|29|6.56  8.6,--,5.5  02-25 @ 14:45                  Procedure Performed:  (  )Yes     (  )No  Name of Procedure:  (  )debridement    (  )I&D    (  )Other:  (  )partial thickness     (  )full thickness     (  )subcutaneous     (  )muscle/tendon     (  )bone  (  )sharp     (  )surgical ( x ) No complaints (  ) Complains of:   ASked to followup wound left chest close to hematoma for bleeding    T(F): 98.3 (02-25-20 @ 20:03), Max: 98.6 (02-25-20 @ 15:10)  HR: 62 (02-26-20 @ 05:30) (60 - 68)  BP: 128/71 (02-26-20 @ 05:30) (108/65 - 149/78)  RR: 14 (02-26-20 @ 05:30) (14 - 18)  SpO2: 100% (02-26-20 @ 05:30) (94% - 100%)        Wound Location 1:  tunneling wound very close to hematoma left posterior axillary line which moderate discharge that appears to be old blood                                     8.5    10.64 )-----------( 432      ( 25 Feb 2020 14:45 )             25.5     CBC Full  -  ( 25 Feb 2020 14:45 )  WBC Count : 10.64 K/uL  RBC Count : 2.85 M/uL  Hemoglobin : 8.5 g/dL  Hematocrit : 25.5 %  Platelet Count - Automated : 432 K/uL  Mean Cell Volume : 89.5 fl  Mean Cell Hemoglobin : 29.8 pg  Mean Cell Hemoglobin Concentration : 33.3 gm/dL  Auto Neutrophil # : x  Auto Lymphocyte # : x  Auto Monocyte # : x  Auto Eosinophil # : x  Auto Basophil # : x  Auto Neutrophil % : x  Auto Lymphocyte % : x  Auto Monocyte % : x  Auto Eosinophil % : x  Auto Basophil % : x    137|98|40<96  4.3|29|6.56  8.6,--,5.5  02-25 @ 14:45                  Procedure Performed:  (  )Yes     (x  )No  Name of Procedure:  (  )debridement    (  )I&D    (  )Other:  (  )partial thickness     (  )full thickness     (  )subcutaneous     (  )muscle/tendon     (  )bone  (  )sharp     (  )surgical

## 2020-02-26 NOTE — PROGRESS NOTE ADULT - ASSESSMENT
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 His course was further complicated by being cardiac arrest in setting of saddle PE. Additionally he was noted to have LUE AVF bleeding due to brachial artery pseudoaneurysm that underwent repair. Additionally he was noted to have an acute DVT of the RUE brachial vein as well. He is currently admitted to [facility] for acute rehabilitation of his functional deficits.  Rehab course complicated by prolongation of his QTc- PT/OT, DVT PPX    DVT/PE- continue eliquis, s/p IVC filter    Atrial Fibrillation -  amiodarone was dced, continue metoprolol, Eliquis, check EKG    Hypertension - continue metoprolol, QT prolongation suspect medication induced, CARDIO CONSULT NOTED, check EKG    Elevated TSH - REPEAT IN 1 WEEK     ESRD on HD (normally T-Th-Sat)- Nephrology following - renal diet    ANEMIA- epogen with HD    Perinephritic Abscess - cipro was dc/ed due to QTC prolongation, ID REC NOTED    Gout - allopurinol    Leukocytosis - trending down, likely in setting of resolving infection    Thrombocytosis - likely reactive    LUE Hematoma - continue to monitor closely given anticoagulation usage, surgery recommendations appreciated    DVT prophylaxis:  is on Eliquis

## 2020-02-26 NOTE — PROVIDER CONTACT NOTE (OTHER) - ACTION/TREATMENT ORDERED:
No new orders as of this time
No new orders.Resident said its okay for the EKG to be done tomorrow am.
Will discuss about other options of IV access tomorrow with attending or hospitalist.
Hydrocortisone cream topical
Rescheduled to 0800 am , and will follow up by day team to reorder to during dialysis

## 2020-02-26 NOTE — PROGRESS NOTE ADULT - ASSESSMENT
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 Young, followed by Dr. Raphael, [facility] Oncologist), RA with hand contractures, DDRT 12/8/18 c/b DGF requiring who presented to [facility] on 12/27/19 from dialysis for a fever found to be flu positive treated with tamiflu and have enterobacter infected perinephretic hematoma being admitted here  with Gait Instability, ADL impairments and Functional impairments. His course was complicated by RP hematoma and bladder perforation s/p repair and evacuation on 1/10, saddle PE s/p IVC filter on 1/10, acute DVT of RUE brachial vein, LUE AVF bleeding left deep brachial artery psuedoaneurysm, and cardiac arrest.     #Debility due to prolonged and complicated hospital course  -continue Comprehensive Rehab Program of PT/OT  -restorative dining added due to difficulty with container opening/hand use  -neuropsychology for support due to anxiety    #DDRT: c/b DGF/ATN/perinephric infected hematoma/pseudoneurysm/graft nephrectomy   - S/P AVF cannulation for HD c/b infiltration causing extravasation/bleeding to the left upper arm and chest noted on CTA  - S/P IR LUE arteriography showing a tiny pseudoaneurysm arising from a distal branch of the left deep brachial artery s/p dissection of a tiny feeder artery that resulted in resolution of the pseudoaneurysm.  - S/P IR permacath placement with TTS HD  - Eliquis 5mg BID started 2/12 and hold Aspirin    - Unable to continue with ciprofloxacin for abx course due to prolonged Qtc. ID consulted and completed one dose of amikacin 500mg after dialysis on 2/18 in order to complete abx course. Per ID, no need for additional antibiotics.  - pain control: Tylenol, Oxycodone. Neurontin discontinued on 2/15 due to prolonged QTc  - Wound care consult  appreciated 2/14  -Dr Polo saw patient this morning, vascular consulted per recs, continue local wound care     - Thigh high compression stocking/ACE wrap to bilateral LE    - ACE wrap LUE from hand to shoulder  - Nephrology following    #Saddle PE/multiple DVT: RUE brachial DVT,  Right distal brachia vein , acute DVT of Right external iliac, common femoral, femoral veins, Right soleal vein   - S/p IVC filter on 1/9  - Apixaban 5mg BID  -monitor H/H, cardiopulmonary status      #New Afib/Vtach:  -Continue metoprolol  -Patient with prolonged QTC. Seen by hospitalist and cardiology 2/15:  ·	DC cipro and gabapentin  ·	serial ekgs-> 2/23 still prolonged QTC  Cardiology consulted and discontinued amiodarone   -Will repeat EKG today     #Amiodarone toxicity: rule out per cardiology. LFTs stable. TSH 15  -Follow up CXR-possible fluid overload but might be bc patient was in dyalysis       #Elevated TSH  TSH 15  Total T3 66  Free thyroxine 1.5  T4 8.9   Per hospitalist repeat TSH in 1 week, ?reactive       #HTN:  -Continue metoprolol and hold on HD days per outside hospital    #ESRD: on HD  -Nephrology consult.on T/Th/Sat  -permacath care  -Epogen, nephrovite    #Glaucoma:  -Continue with drops    #Gout:  -Allopurinol    #RA:  -Continue with comprehensive rehab  -Continue with pain control as below  -rheumatology consult, appreciate recs    #Gas: improved   -simethicone prn    #Thrombocytosis:  -Monitor labs  -Hematology follow up as an outpatient    #Hyponatremia: Resolved  -Monitor labs    #Anemia: stable   -Continue with epogen  -Monitor labs    #Pain Mgmt   - Tylenol PRN, Oxycodone PRN. DC neurontin given QTc prolongation    #GI/Bowel Mgmt  -  Continent c/w  Senna,  Miralax  - Continue lactulose PRN    #/Bladder Mgmt -   HD    #FEN   - Diet - Regular + Thins, Renal Restrictions  - DC famotidine due to prolonged Qtc  -nutrition consult    Dispo:  Plan for discharge 3/3/20 with goals of min to mod assist.

## 2020-02-26 NOTE — PROVIDER CONTACT NOTE (OTHER) - RECOMMENDATIONS
MDs to review chart to ascertain how IVAB were given in previous hospitalization, Dr Alcocer to speak to ICU PA to see if other access could be obtained
Resident notified. Said its okay to be done tomorrow morning
Resident stated they are aware and will see if pt can get another dialysis tomorrow because he is very edematous and that way pt could also get his antibiotics post dialysis.
Hydrocortisone cream topical
Rescheduled to 0800 am , and will follow up by day team to reorder to during dialysis

## 2020-02-26 NOTE — CONSULT NOTE ADULT - SUBJECTIVE AND OBJECTIVE BOX
History of Present Illness:  49y.o  Male with a history of ESRD on dialysis for about 6 years had his fistula infiltrate while at [facility] and developed  a hematoma of his left upper arm and left back. The patient then had a Permacath inserted in the RIJ vein and has been dialyzed via the catheter. Before leaving [facility] the patient had a I&D of his left upper back. The open wound is being managed by . I was requested to evaluate his fistula.    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  Carpal tunnel syndrome  Brain cyst: had MRI recently- now gone  Anemia  Gastric ulcer: Long time ago  Rheumatoid arthritis  Renal transplant recipient  Breakdown (mechanical) of penile (implanted) prosthesis, sequela  End-stage renal disease (ESRD): PERMACATH PLACEMENT  Abscess of left buttock: s/p I &amp; D  AV fistula: placement left lower arm  S/P cystoscopy: stent placement &amp; removal for kidney stones, lithiotripsy  s/p Lt Carpal tunnel: 2011      Allergies    coconut (Anaphylaxis)  morphine (Pruritus; Rash)    Intolerances        MEDICATIONS  (STANDING):  allopurinol 100 milliGRAM(s) Oral daily  apixaban 5 milliGRAM(s) Oral two times a day  AQUAPHOR (petrolatum Ointment) 1 Application(s) Topical three times a day  brimonidine 0.2% Ophthalmic Solution 1 Drop(s) Both EYES three times a day  chlorhexidine 4% Liquid 1 Application(s) Topical daily  epoetin trey Injectable 45420 Unit(s) IV Push <User Schedule>  latanoprost 0.005% Ophthalmic Solution 1 Drop(s) Both EYES daily  lidocaine 2% Gel 1 Application(s) Topical two times a day  metoprolol tartrate 25 milliGRAM(s) Oral every 12 hours  Nephro-candace 1 Tablet(s) Oral daily  nystatin    Suspension 180633 Unit(s) Oral four times a day  polyethylene glycol 3350 17 Gram(s) Oral daily  saccharomyces boulardii 250 milliGRAM(s) Oral two times a day  senna 2 Tablet(s) Oral at bedtime    MEDICATIONS  (PRN):  acetaminophen   Tablet .. 975 milliGRAM(s) Oral every 6 hours PRN Mild Pain (1 - 3)  benzocaine 15 mG/menthol 3.6 mG (Sugar-Free) Lozenge 1 Lozenge Oral three times a day PRN Sore Throat  bisacodyl Suppository 10 milliGRAM(s) Rectal daily PRN Constipation  hydrocortisone 2.5% Lotion 1 Application(s) Topical every 12 hours PRN Rash and/or Itching  lactulose Syrup 10 Gram(s) Oral daily PRN constipation  oxyCODONE    IR 5 milliGRAM(s) Oral every 4 hours PRN Moderate Pain (4 - 6)  oxyCODONE    IR 10 milliGRAM(s) Oral every 4 hours PRN Severe Pain (7 - 10)  simethicone 80 milliGRAM(s) Chew three times a day PRN Gas      Social History:  Smoking History: denies  Alcohol Use: denies    REVIEW OF SYSTEMS:  CONSTITUTIONAL: No  fevers or chills  EYES/ENT: No visual changes;  No vertigo or throat pain   NECK: No pain or stiffness ++ right IJ  Permacath.  RESPIRATORY: No cough, wheezing, hemoptysis; No shortness of breath  CARDIOVASCULAR: No chest pain or palpitations  GASTROINTESTINAL: No abdominal or epigastric pain. No nausea, vomiting, or hematemesis; No diarrhea or constipation. No melena or hematochezia.  GENITOURINARY: No dysuria, frequency or hematuria  NEUROLOGICAL: No numbness or weakness  SKIN: No itching, burning, rashes, or lesions   Vascular:  No lower extremity claudication, pedal rest pain or digital ulcers ++ hematoma and swelling of left upper arm and left back  All other review of systems is negative unless indicated above.    PHYSICAL EXAM:  General:  On exam, the patient is alert nontoxic appearing Male in no acute distress.  Vital Signs Last 24 Hrs  T(C): 36.4 (26 Feb 2020 09:28), Max: 37 (25 Feb 2020 15:10)  T(F): 97.5 (26 Feb 2020 09:28), Max: 98.6 (25 Feb 2020 15:10)  HR: 85 (26 Feb 2020 09:28) (61 - 85)  BP: 121/73 (26 Feb 2020 09:28) (108/65 - 129/60)  BP(mean): --  RR: 12 (26 Feb 2020 09:28) (12 - 18)  SpO2: 98% (26 Feb 2020 09:28) (95% - 100%)    Neck:  4+/4+ bilateral carotid pulses; no carotid bruit, no palpable cervical masses.  Heart:  Regular, no murmurs, rubs or gallops.    Lungs:  Clear to auscultation.    Chest:  No chest wall deformities.  Symmetrical chest expansion.  ++ clean and intact RIJ Permacath  Abdomen: Soft and nontender.  No palpable masses.  No rebound, guarding or rigidity.  Upper extremities: There is a patent left forearm AVF with a good bruit and thrill. There is a firm hematoma of the left upper arm and left back  10 x10 cm.  No infections. There is a open wound of left upper back that is clean and granulating. All pulses in both arms are graded 4/4  l  Neurological:  There are no motor or sensory deficits in either upper  extremity.                          8.5    10.64 )-----------( 432      ( 25 Feb 2020 14:45 )             25.5     02-25    137  |  98  |  40<H>  ----------------------------<  96  4.3   |  29  |  6.56<H>    Ca    8.6      25 Feb 2020 14:45  Phos  5.5     02-25    TPro  x   /  Alb  2.3<L>  /  TBili  x   /  DBili  x   /  AST  x   /  ALT  x   /  AlkPhos  x   02-25            Radiology:

## 2020-02-26 NOTE — PROGRESS NOTE ADULT - PROBLEM SELECTOR PLAN 1
agree with saline packing hematoma appears smaller.  ? I and D needed to minimize infection and facilitate better local wound care.  Suggest Vascular consult

## 2020-02-26 NOTE — CONSULT NOTE ADULT - ASSESSMENT
49 y.o. male with ESRD has a hematoma of his left arm and back. I would recommend not using his left arm AVF for 6-8 weeks

## 2020-02-26 NOTE — PROGRESS NOTE ADULT - ASSESSMENT
multiple wounds multiple wounds.  left chest (as above), bilateral feet wounds are clean, and right groin wound almost closed

## 2020-02-27 LAB
ALBUMIN SERPL ELPH-MCNC: 2.3 G/DL — LOW (ref 3.3–5)
ALP SERPL-CCNC: 210 U/L — HIGH (ref 40–120)
ALT FLD-CCNC: 9 U/L — LOW (ref 10–45)
ANION GAP SERPL CALC-SCNC: 11 MMOL/L — SIGNIFICANT CHANGE UP (ref 5–17)
AST SERPL-CCNC: 36 U/L — SIGNIFICANT CHANGE UP (ref 10–40)
BILIRUB SERPL-MCNC: 1.5 MG/DL — HIGH (ref 0.2–1.2)
BUN SERPL-MCNC: 35 MG/DL — HIGH (ref 7–23)
CALCIUM SERPL-MCNC: 8.5 MG/DL — SIGNIFICANT CHANGE UP (ref 8.4–10.5)
CHLORIDE SERPL-SCNC: 97 MMOL/L — SIGNIFICANT CHANGE UP (ref 96–108)
CO2 SERPL-SCNC: 29 MMOL/L — SIGNIFICANT CHANGE UP (ref 22–31)
CREAT SERPL-MCNC: 6.48 MG/DL — HIGH (ref 0.5–1.3)
GLUCOSE SERPL-MCNC: 93 MG/DL — SIGNIFICANT CHANGE UP (ref 70–99)
HCT VFR BLD CALC: 26 % — LOW (ref 39–50)
HGB BLD-MCNC: 8.7 G/DL — LOW (ref 13–17)
MCHC RBC-ENTMCNC: 29.8 PG — SIGNIFICANT CHANGE UP (ref 27–34)
MCHC RBC-ENTMCNC: 33.5 GM/DL — SIGNIFICANT CHANGE UP (ref 32–36)
MCV RBC AUTO: 89 FL — SIGNIFICANT CHANGE UP (ref 80–100)
NRBC # BLD: 0 /100 WBCS — SIGNIFICANT CHANGE UP (ref 0–0)
PHOSPHATE SERPL-MCNC: 4.6 MG/DL — HIGH (ref 2.5–4.5)
PLATELET # BLD AUTO: 431 K/UL — HIGH (ref 150–400)
POTASSIUM SERPL-MCNC: 3.7 MMOL/L — SIGNIFICANT CHANGE UP (ref 3.5–5.3)
POTASSIUM SERPL-SCNC: 3.7 MMOL/L — SIGNIFICANT CHANGE UP (ref 3.5–5.3)
PROT SERPL-MCNC: 7.3 G/DL — SIGNIFICANT CHANGE UP (ref 6–8.3)
RBC # BLD: 2.92 M/UL — LOW (ref 4.2–5.8)
RBC # FLD: 16.4 % — HIGH (ref 10.3–14.5)
SODIUM SERPL-SCNC: 137 MMOL/L — SIGNIFICANT CHANGE UP (ref 135–145)
WBC # BLD: 10.67 K/UL — HIGH (ref 3.8–10.5)
WBC # FLD AUTO: 10.67 K/UL — HIGH (ref 3.8–10.5)

## 2020-02-27 PROCEDURE — 99232 SBSQ HOSP IP/OBS MODERATE 35: CPT | Mod: GC

## 2020-02-27 PROCEDURE — 93010 ELECTROCARDIOGRAM REPORT: CPT

## 2020-02-27 PROCEDURE — 99232 SBSQ HOSP IP/OBS MODERATE 35: CPT

## 2020-02-27 RX ORDER — OXYCODONE HYDROCHLORIDE 5 MG/1
10 TABLET ORAL EVERY 4 HOURS
Refills: 0 | Status: DISCONTINUED | OUTPATIENT
Start: 2020-02-27 | End: 2020-03-02

## 2020-02-27 RX ORDER — TUBERCULIN PURIFIED PROTEIN DERIVATIVE 5 [IU]/.1ML
5 INJECTION, SOLUTION INTRADERMAL ONCE
Refills: 0 | Status: COMPLETED | OUTPATIENT
Start: 2020-02-27 | End: 2020-02-28

## 2020-02-27 RX ORDER — OXYCODONE HYDROCHLORIDE 5 MG/1
5 TABLET ORAL EVERY 4 HOURS
Refills: 0 | Status: DISCONTINUED | OUTPATIENT
Start: 2020-02-27 | End: 2020-03-02

## 2020-02-27 RX ADMIN — Medication 25 MILLIGRAM(S): at 19:23

## 2020-02-27 RX ADMIN — Medication 250 MILLIGRAM(S): at 19:23

## 2020-02-27 RX ADMIN — OXYCODONE HYDROCHLORIDE 10 MILLIGRAM(S): 5 TABLET ORAL at 11:00

## 2020-02-27 RX ADMIN — APIXABAN 5 MILLIGRAM(S): 2.5 TABLET, FILM COATED ORAL at 05:53

## 2020-02-27 RX ADMIN — Medication 500000 UNIT(S): at 13:08

## 2020-02-27 RX ADMIN — LIDOCAINE 1 APPLICATION(S): 4 CREAM TOPICAL at 05:58

## 2020-02-27 RX ADMIN — OXYCODONE HYDROCHLORIDE 5 MILLIGRAM(S): 5 TABLET ORAL at 05:53

## 2020-02-27 RX ADMIN — OXYCODONE HYDROCHLORIDE 10 MILLIGRAM(S): 5 TABLET ORAL at 19:23

## 2020-02-27 RX ADMIN — BRIMONIDINE TARTRATE 1 DROP(S): 2 SOLUTION/ DROPS OPHTHALMIC at 13:08

## 2020-02-27 RX ADMIN — OXYCODONE HYDROCHLORIDE 10 MILLIGRAM(S): 5 TABLET ORAL at 09:59

## 2020-02-27 RX ADMIN — Medication 100 MILLIGRAM(S): at 13:09

## 2020-02-27 RX ADMIN — Medication 1 APPLICATION(S): at 05:58

## 2020-02-27 RX ADMIN — Medication 1 APPLICATION(S): at 13:09

## 2020-02-27 RX ADMIN — ERYTHROPOIETIN 10000 UNIT(S): 10000 INJECTION, SOLUTION INTRAVENOUS; SUBCUTANEOUS at 16:43

## 2020-02-27 RX ADMIN — Medication 250 MILLIGRAM(S): at 05:52

## 2020-02-27 RX ADMIN — BRIMONIDINE TARTRATE 1 DROP(S): 2 SOLUTION/ DROPS OPHTHALMIC at 05:54

## 2020-02-27 RX ADMIN — SIMETHICONE 80 MILLIGRAM(S): 80 TABLET, CHEWABLE ORAL at 13:09

## 2020-02-27 RX ADMIN — CHLORHEXIDINE GLUCONATE 1 APPLICATION(S): 213 SOLUTION TOPICAL at 20:59

## 2020-02-27 RX ADMIN — APIXABAN 5 MILLIGRAM(S): 2.5 TABLET, FILM COATED ORAL at 19:23

## 2020-02-27 RX ADMIN — Medication 1 TABLET(S): at 13:09

## 2020-02-27 RX ADMIN — Medication 500000 UNIT(S): at 05:58

## 2020-02-27 NOTE — PROGRESS NOTE ADULT - ASSESSMENT
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 Young, followed by Dr. 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 on 12/27/19 from dialysis for a fever found to be flu positive treated with tamiflu and have enterobacter infected perinephretic hematoma being admitted here  with Gait Instability, ADL impairments and Functional impairments. His course was complicated by RP hematoma and bladder perforation s/p repair and evacuation on 1/10, saddle PE s/p IVC filter on 1/10, acute DVT of RUE brachial vein, LUE AVF bleeding left deep brachial artery psuedoaneurysm, and cardiac arrest.     #Debility due to prolonged and complicated hospital course  -continue Comprehensive Rehab Program of PT/OT  -restorative dining added due to difficulty with container opening/hand use  -neuropsychology for support due to anxiety    #DDRT: c/b DGF/ATN/perinephric infected hematoma/pseudoneurysm/graft nephrectomy   - S/P AVF cannulation for HD c/b infiltration causing extravasation/bleeding to the left upper arm and chest noted on CTA  - S/P IR LUE arteriography showing a tiny pseudoaneurysm arising from a distal branch of the left deep brachial artery s/p dissection of a tiny feeder artery that resulted in resolution of the pseudoaneurysm.  - S/P IR permacath placement with TTS HD  - Eliquis 5mg BID started 2/12 and hold Aspirin    - Unable to continue with ciprofloxacin for abx course due to prolonged Qtc. ID consulted and completed one dose of amikacin 500mg after dialysis on 2/18 in order to complete abx course. Per ID, no need for additional antibiotics.  - pain control: Tylenol, Oxycodone. Neurontin discontinued on 2/15 due to prolonged QTc  - Wound care consult  appreciated 2/14  -Dr Polo saw patient this morning, vascular consulted per recs, continue local wound care     - Thigh high compression stocking/ACE wrap to bilateral LE    - ACE wrap LUE from hand to shoulder  - Nephrology following    #Saddle PE/multiple DVT: RUE brachial DVT,  Right distal brachia vein , acute DVT of Right external iliac, common femoral, femoral veins, Right soleal vein   - S/p IVC filter on 1/9  - Apixaban 5mg BID  -monitor H/H, cardiopulmonary status      #New Afib/Vtach:  -Continue metoprolol  -Patient with prolonged QTC. Seen by hospitalist and cardiology 2/15:  ·	DC cipro and gabapentin  ·	serial ekgs-> 2/23 still prolonged QTC  Cardiology consulted and discontinued amiodarone   -Will repeat EKG today     #Amiodarone toxicity: rule out per cardiology. LFTs stable. TSH 15  -Follow up CXR-possible fluid overload but might be bc patient was in dyalysis       #Elevated TSH  TSH 15  Total T3 66  Free thyroxine 1.5  T4 8.9   Per hospitalist repeat TSH in 1 week, ?reactive       #HTN:  -Continue metoprolol and hold on HD days per outside hospital    #ESRD: on HD  -Nephrology consult.on T/Th/Sat  -permacath care  -Epogen, nephrovite    #Glaucoma:  -Continue with drops    #Gout:  -Allopurinol    #RA:  -Continue with comprehensive rehab  -Continue with pain control as below  -rheumatology consult, appreciate recs    #Gas: improved   -simethicone prn    #Thrombocytosis:  -Monitor labs  -Hematology follow up as an outpatient    #Hyponatremia: Resolved  -Monitor labs    #Anemia: stable   -Continue with epogen  -Monitor labs    #Pain Mgmt   - Tylenol PRN, Oxycodone PRN. DC neurontin given QTc prolongation    #GI/Bowel Mgmt  -  Continent c/w  Senna,  Miralax  - Continue lactulose PRN    #/Bladder Mgmt -   HD    #FEN   - Diet - Regular + Thins, Renal Restrictions  - DC famotidine due to prolonged Qtc  -nutrition consult    Dispo:  Plan for discharge 3/3/20 with goals of min to mod assist. 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 Young, followed by Dr. 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 His course was complicated by RP hematoma and bladder perforation s/p repair and evacuation on 1/10, saddle PE s/p IVC filter on 1/10, acute DVT of RUE brachial vein, LUE AVF bleeding left deep brachial artery psuedoaneurysm, and cardiac arrest.     #Debility due to prolonged and complicated hospital course  -continue Comprehensive Rehab Program of PT/OT  -restorative dining added due to difficulty with container opening/hand use  -neuropsychology for support due to anxiety    #DDRT: c/b DGF/ATN/perinephric infected hematoma/pseudoneurysm/graft nephrectomy   - S/P AVF cannulation for HD c/b infiltration causing extravasation/bleeding to the left upper arm and chest noted on CTA  - S/P IR LUE arteriography showing a tiny pseudoaneurysm arising from a distal branch of the left deep brachial artery s/p dissection of a tiny feeder artery that resulted in resolution of the pseudoaneurysm.  - S/P IR permacath placement with TTS HD  - Eliquis 5mg BID started 2/12 and hold Aspirin    - Unable to continue with ciprofloxacin for abx course due to prolonged Qtc. ID consulted and completed one dose of amikacin 500mg after dialysis on 2/18 in order to complete abx course. Per ID, no need for additional antibiotics.  - pain control: Tylenol, Oxycodone. Neurontin discontinued on 2/15 due to prolonged QTc  - Wound care consult  appreciated 2/14  -Dr Polo saw patient this morning, vascular consulted per recs, continue local wound care     - Thigh high compression stocking/ACE wrap to bilateral LE    - ACE wrap LUE from hand to shoulder  - Nephrology following    #Saddle PE/multiple DVT: RUE brachial DVT,  Right distal brachia vein , acute DVT of Right external iliac, common femoral, femoral veins, Right soleal vein   - S/p IVC filter on 1/9  - Apixaban 5mg BID  -monitor H/H, cardiopulmonary status      #New Afib/Vtach:  -Continue metoprolol  -Patient with prolonged QTC. Seen by hospitalist and cardiology 2/15:  ·	DC cipro and gabapentin  ·	serial ekgs-> 2/23 still prolonged QTC  Cardiology consulted and discontinued amiodarone   -Will repeat EKG today     #Amiodarone toxicity: rule out per cardiology. LFTs stable. TSH 15  -Follow up CXR-possible fluid overload but might be bc patient was in dyalysis       #Elevated TSH  TSH 15  Total T3 66  Free thyroxine 1.5  T4 8.9   Per hospitalist repeat TSH in 1 week, ?reactive       #HTN:  -Continue metoprolol and hold on HD days per outside hospital    #ESRD: on HD  -Nephrology consult.on T/Th/Sat  -permacath care  -Epogen, nephrovite    #Glaucoma:  -Continue with drops    #Gout:  -Allopurinol    #RA:  -Continue with comprehensive rehab  -Continue with pain control as below  -rheumatology consult, appreciate recs    #Gas: improved   -simethicone prn    #Thrombocytosis:  -Monitor labs  -Hematology follow up as an outpatient    #Hyponatremia: Resolved  -Monitor labs    #Anemia: stable   -Continue with epogen  -Monitor labs    #Pain Mgmt   - Tylenol PRN, Oxycodone PRN. DC neurontin given QTc prolongation    #GI/Bowel Mgmt  -  Continent c/w  Senna,  Miralax  - Continue lactulose PRN    #/Bladder Mgmt -   HD    #FEN   - Diet - Regular + Thins, Renal Restrictions  - DC famotidine due to prolonged Qtc  -nutrition consult    Dispo:  Plan for discharge 3/3/20 with goals of min to mod assist.    IDT: 2/27/2020:   Barriers to home from nursing perspective: Wound care, HD (T/Th/Sat)  CG for transfers   Sit to sat mod A for tub bench, refused to practice slide on tub bench due to pain  CG for commode transfers   Mod A lower body dressing   Ambulating 100ft with RW GC  4 steps with 1 rail min to mod A   Barriers for d/c per therapists: low endurance, foot pain, shoes in closet are too small to wear during therapies, 2 KATHYA without rail   Tentative date for d/c to home 3/3/2020  Social work to arrange for family training, follow up with patient regarding getting railing installed to enter the home ASSESSMENT/PLAN  49M with PMHx of ESRD on HD ( via LUE AVF), HTN, Gout, Glaucoma, 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 Young, followed by Dr. 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 His course was complicated by RP hematoma and bladder perforation s/p repair and evacuation on 1/10, saddle PE s/p IVC filter on 1/10, acute DVT of RUE brachial vein, LUE AVF bleeding left deep brachial artery psuedoaneurysm, and cardiac arrest.     #Debility due to prolonged and complicated hospital course  -continue Comprehensive Rehab Program of PT/OT  -restorative dining added due to difficulty with container opening/hand use  -neuropsychology for support due to anxiety    #DDRT: c/b DGF/ATN/perinephric infected hematoma/pseudoneurysm/graft nephrectomy   - S/P AVF cannulation for HD c/b infiltration causing extravasation/bleeding to the left upper arm and chest noted on CTA  - S/P IR LUE arteriography showing a tiny pseudoaneurysm arising from a distal branch of the left deep brachial artery s/p dissection of a tiny feeder artery that resulted in resolution of the pseudoaneurysm.  - S/P IR permacath placement with TTS HD  - Eliquis 5mg BID started 2/12 and hold Aspirin    - Unable to continue with ciprofloxacin for abx course due to prolonged Qtc. ID consulted and completed one dose of amikacin 500mg after dialysis on 2/18 in order to complete abx course. Per ID, no need for additional antibiotics.  - pain control: Tylenol, Oxycodone. Neurontin discontinued on 2/15 due to prolonged QTc  - Wound care consult  appreciated 2/14  -Follow up visit by Dr Polo 2/26, Vascular surgery as well: recs to continue local wound care and to not use left arm AVF for 6-8 weeks.   - Thigh high compression stocking/ACE wrap to bilateral LE    - ACE wrap LUE from hand to shoulder  - Nephrology following    #Saddle PE/multiple DVT: RUE brachial DVT,  Right distal brachia vein , acute DVT of Right external iliac, common femoral, femoral veins, Right soleal vein   - S/p IVC filter on 1/9  - Apixaban 5mg BID  -monitor H/H, cardiopulmonary status      #New Afib/Vtach:  -Continue metoprolol  -Patient with prolonged QTC. Seen by hospitalist and cardiology 2/15:  ·	DC cipro and gabapentin  ·	serial ekgs-> 2/23 still prolonged QTC  Cardiology consulted and discontinued amiodarone   -Repeat EKG pending-hospitalist following     #Amiodarone toxicity: rule out per cardiology. LFTs stable. TSH 15  -Follow up CXR-possible fluid overload but might be bc patient was in dyalysis       #Elevated TSH  TSH 15  Total T3 66  Free thyroxine 1.5  T4 8.9   Per hospitalist repeat TSH in 1 week, ?reactive       #HTN:  -Continue metoprolol and hold on HD days per outside hospital    #ESRD: on HD  -Nephrology consult.on T/Th/Sat  -permacath care  -Epogen, nephrovite    #Glaucoma:  -Continue with drops    #Gout:  -Allopurinol    #RA:  -Continue with comprehensive rehab  -Continue with pain control as below  -rheumatology consult, appreciate recs    #Gas: improved   -simethicone prn    #Thrombocytosis:  -Monitor labs  -Hematology follow up as an outpatient    #Hyponatremia: Resolved  -Monitor labs    #Anemia: stable   -Continue with epogen  -Monitor labs    #Pain Mgmt   - Tylenol PRN, Oxycodone PRN. DC neurontin given QTc prolongation    #GI/Bowel Mgmt  -  Continent c/w  Senna,  Miralax  - Continue lactulose PRN    #/Bladder Mgmt -   HD    #FEN   - Diet - Regular + Thins, Renal Restrictions  - DC famotidine due to prolonged Qtc  -nutrition consult    Dispo:  Plan for discharge 3/3/20 with goals of min to mod assist.    IDT: 2/27/2020:   Barriers to home from nursing perspective: Wound care, HD (T/Th/Sat)  CG for transfers   Sit to sat mod A for tub bench, refused to practice slide on tub bench due to pain  CG for commode transfers   Mod A lower body dressing   Ambulating 100ft CG with RW   4 steps with 1 rail min to mod A   Barriers for d/c per therapists: low endurance, foot pain, shoes in closet are too small to wear during therapies, 2 KATHYA without rail   Tentative date for d/c to home 3/3/2020  Social work to arrange for family training, follow up with patient regarding getting railing installed to enter the home

## 2020-02-27 NOTE — PROGRESS NOTE ADULT - SUBJECTIVE AND OBJECTIVE BOX
Patient is a 49y old  Male who presents with a chief complaint of Debility (20 Feb 2020 19:07)          Review of Systems:   · Additional ROS	No acute events overnight. Patient reports that he is overall doing well. Tolerating therapies. States that he isn't wearing his shoes due to the wounds on his feet.  Pain controlled except reports pain to the bottom of his feet and toes. Denies HA, dizziness, CP, SOB, abdominal pain, constipation. LBM yesterday. Still voids small amounts. No dysuria  	    Physical Exam:   · Constitutional	detailed exam	  · Constitutional Comments	alert O x 3.	  · Respiratory	detailed exam	  · Respiratory Details	breathing comfortably on room air, no increased work of breathing. CTA 	  · Respiratory Comments	fair effort, cath site clean chest wall right, no swelling or warmth no TTP	  · Cardiovascular	detailed exam	  · Cardiovascular Details	regular rate and rhythm	  · Cardiovascular Comments	warm, well perfused	  · Gastrointestinal	detailed exam	  · GI Normal	normal; soft; nontender, dressing to right lower quadrant C/D/I 	  · Extremities	detailed exam	  · Extremities Comments	good color distally, wiggles toes 1+ LE swelling RUE 1+ swelling Pain with palpation to bottom of feet and dorsum aspect of toes bilaterally  Two healing wound to the dorsum of left foot:proximal wound measures about 1x2 cm with healthy pink tissue to center of wound (found after removal of slough) and margins showing signs of epithelization, distal wound measures about 0.75 x 1 with healthy pink tissue and margins showing signs of epithelization. Wound to dorsum of right foot measures about 1x2cm with dry slough noted to wound bed. 	    Functional Status:   CG to min A with RW for transfers   navigating 4 steps with 2 rails and mod A   upper body dressing supervision   lower body dressing min A Patient is a 49y old  Male who presents with a chief complaint of Debility (20 Feb 2020 19:07)          Review of Systems:   · Additional ROS	No acute events overnight. Patient reports that he is overall doing well. Tolerating therapies. States that he isn't wearing his shoes due to the wounds on his feet.  Pain controlled except reports pain to the bottom of his feet and toes. Denies HA, dizziness, CP, SOB, abdominal pain, constipation. LBM yesterday. Still voids small amounts. No dysuria  	    Physical Exam:   · Constitutional	detailed exam	  · Constitutional Comments	alert O x 3.	  · Respiratory	detailed exam	  · Respiratory Details	breathing comfortably on room air, no increased work of breathing. CTA 	  · Respiratory Comments	fair effort, cath site clean chest wall right, no swelling or warmth no TTP	  · Cardiovascular	detailed exam	  · Cardiovascular Details	regular rate and rhythm	  · Cardiovascular Comments	warm, well perfused	  · Gastrointestinal	detailed exam	  · GI Normal	normal; soft; nontender, dressing to right lower quadrant C/D/I 	  · Extremities	detailed exam	  · Extremities Comments	good color distally, wiggles toes 1+ LE swelling RUE 1+ swelling Pain with palpation to bottom of feet and dorsum aspect of toes bilaterally  Two healing wound to the dorsum of left foot:proximal wound measures about 1x2 cm with healthy pink tissue to center of wound (found after removal of slough) and margins showing signs of epithelization, distal wound measures about 0.75 x 1 with healthy pink tissue and margins showing signs of epithelization. Wound to dorsum of right foot measures about 1x2cm with dry slough noted to wound bed. Patient is a 49y old  Male who presents with a chief complaint of Debility (20 Feb 2020 19:07)          Review of Systems:   · Additional ROS	No acute events overnight. Patient reports that he is overall doing well. Tolerating therapies. States that he isn't wearing his shoes due to the wounds on his feet.  Pain controlled except reports pain to the bottom of his feet and toes. Denies HA, dizziness, CP, SOB, abdominal pain, constipation. LBM yesterday. Still voids small amounts. No dysuria  	    Physical Exam:   · Constitutional	detailed exam	  · Constitutional Comments	alert O x 3.	  · Respiratory	detailed exam	  · Respiratory Details	breathing comfortably on room air, no increased work of breathing. CTA 	  · Respiratory Comments	fair effort, cath site clean chest wall right, no swelling or warmth no TTP	  · Cardiovascular	detailed exam	  · Cardiovascular Details	regular rate and rhythm	  · Cardiovascular Comments	warm, well perfused	  · Gastrointestinal	detailed exam	  · GI Normal	normal; soft; nontender, dressing to right lower quadrant C/D/I 	  · Extremities	detailed exam	  · Extremities Comments	good color distally, wiggles toes 1+ LE swelling RUE 1+ swelling Pain with palpation to bottom of feet and dorsum aspect of toes bilaterally  Two healing wound to the dorsum of left foot:proximal wound measures about 1x2 cm with healthy pink tissue to center of wound (found after removal of slough) and margins showing signs of epithelization, distal wound measures about 0.75 x 1 with healthy pink tissue and margins showing signs of epithelization. Wound to dorsum of right foot measures about 1x2cm with dry slough noted to wound bed.  Dressing to left axilla c/d/i

## 2020-02-27 NOTE — PROGRESS NOTE ADULT - SUBJECTIVE AND OBJECTIVE BOX
[facility] NEPHROLOGY SERVICES, [facility]  NEPHROLOGY AND HYPERTENSION  [address]  [address]  [place] [address]  [phone]    MD GIANA GUERRERO, MD BEAN ARAUJO, MD JANAE VICTORIA, MD BARBARA MONET, MD DEANN GORDON, MD SALUD GENTILE, MD CATRINA WOO, MD    Seen on HD    Vital Signs Last 24 Hrs  T(C): 36.8 (02-27-20 @ 14:50), Max: 37.2 (02-27-20 @ 14:21)  T(F): 98.2 (02-27-20 @ 14:50), Max: 98.9 (02-27-20 @ 14:21)  HR: 70 (02-27-20 @ 14:50) (58 - 70)  BP: 101/77 (02-27-20 @ 14:50) (101/77 - 137/81)  RR: 16 (02-27-20 @ 14:50) (15 - 16)  SpO2: 99% (02-27-20 @ 14:50) (96% - 99%)    Respiratory: decreased BS at bases  Cardiovascular: S1 S2  Gastrointestinal: soft NT ND +BS  Extremities: edema improving  AO                                        8.7    10.67 )-----------( 431      ( 27 Feb 2020 15:00 )             26.0     27 Feb 2020 15:00    137    |  97     |  35     ----------------------------<  93     3.7     |  29     |  6.48     Ca    8.5        27 Feb 2020 15:00  Phos  4.6       27 Feb 2020 15:00    TPro  7.3    /  Alb  2.3    /  TBili  1.5    /  DBili  x      /  AST  36     /  ALT  9      /  AlkPhos  210    27 Feb 2020 15:00    LIVER FUNCTIONS - ( 27 Feb 2020 15:00 )  Alb: 2.3 g/dL / Pro: 7.3 g/dL / ALK PHOS: 210 U/L / ALT: 9 U/L / AST: 36 U/L / GGT: x           acetaminophen   Tablet .. 975 milliGRAM(s) Oral every 6 hours PRN  allopurinol 100 milliGRAM(s) Oral daily  apixaban 5 milliGRAM(s) Oral two times a day  AQUAPHOR (petrolatum Ointment) 1 Application(s) Topical three times a day  benzocaine 15 mG/menthol 3.6 mG (Sugar-Free) Lozenge 1 Lozenge Oral three times a day PRN  bisacodyl Suppository 10 milliGRAM(s) Rectal daily PRN  brimonidine 0.2% Ophthalmic Solution 1 Drop(s) Both EYES three times a day  chlorhexidine 4% Liquid 1 Application(s) Topical daily  epoetin trey Injectable 66813 Unit(s) IV Push <User Schedule>  hydrocortisone 2.5% Lotion 1 Application(s) Topical every 12 hours PRN  lactulose Syrup 10 Gram(s) Oral daily PRN  latanoprost 0.005% Ophthalmic Solution 1 Drop(s) Both EYES daily  lidocaine 2% Gel 1 Application(s) Topical two times a day  metoprolol tartrate 25 milliGRAM(s) Oral every 12 hours  Nephro-candace 1 Tablet(s) Oral daily  nystatin    Suspension 041456 Unit(s) Oral four times a day  oxyCODONE    IR 5 milliGRAM(s) Oral every 4 hours PRN  oxyCODONE    IR 10 milliGRAM(s) Oral every 4 hours PRN  polyethylene glycol 3350 17 Gram(s) Oral daily  PPD  5 Tuberculin Unit(s) Injectable 5 Unit(s) IntraDermal once  saccharomyces boulardii 250 milliGRAM(s) Oral two times a day  senna 2 Tablet(s) Oral at bedtime  simethicone 80 milliGRAM(s) Chew three times a day PRN    Assessment/Plan:    ESRD on HD  S/p DDRT at [facility] 12/8/19, DGF  S/p transplant kidney bx 12/13: ATN, focal TMA  S/p PLEX 12/13, 12/15; d/c-d 12/20 w/op HD 3 x wk  Re-adm 12/27 to [facility] w/Flu A, infected carlos-nephric hematoma   S/p OR 1/1 for washout and repeat renal graft biopsy (ATN)  Dx w/RLE DVT  S/p OR 1/10 for infected carlos-nephric hematoma, hemorrhage involving pseudo-aneurysm of anastomosis of renal artery to external iliac vein,   s/p transplant nephrectomy, s/p IVCF   S/p PEA arrest 1/13, + PE, R retroperitoneal collection  S/p IR drainage of abd collection  RUE DVT, AC  LUE AVF infiltration 2/1 w/hematoma extending to L chest wall while on Eliquis, s/p PRBCs  LUE wound, vascular following  S/p perm cath  Tx to acute rehab 2/14  S/p daily HD last week for volume overload  Improved, HD TTS via perm cath  TMP as able  Epogen  Will follow    [phone]

## 2020-02-27 NOTE — PROGRESS NOTE ADULT - SUBJECTIVE AND OBJECTIVE BOX
Patient is a 49 year old male who presents with a chief complaint of Debility (27 Feb 2020 10:21)    Patient seen and examined earlier, reports his pain is controlled.       MEDICATIONS  (STANDING):  allopurinol 100 milliGRAM(s) Oral daily  apixaban 5 milliGRAM(s) Oral two times a day  AQUAPHOR (petrolatum Ointment) 1 Application(s) Topical three times a day  brimonidine 0.2% Ophthalmic Solution 1 Drop(s) Both EYES three times a day  chlorhexidine 4% Liquid 1 Application(s) Topical daily  epoetin trey Injectable 13048 Unit(s) IV Push <User Schedule>  latanoprost 0.005% Ophthalmic Solution 1 Drop(s) Both EYES daily  lidocaine 2% Gel 1 Application(s) Topical two times a day  metoprolol tartrate 25 milliGRAM(s) Oral every 12 hours  Nephro-candace 1 Tablet(s) Oral daily  nystatin    Suspension 527357 Unit(s) Oral four times a day  polyethylene glycol 3350 17 Gram(s) Oral daily  saccharomyces boulardii 250 milliGRAM(s) Oral two times a day  senna 2 Tablet(s) Oral at bedtime    MEDICATIONS  (PRN):  acetaminophen   Tablet .. 975 milliGRAM(s) Oral every 6 hours PRN Mild Pain (1 - 3)  benzocaine 15 mG/menthol 3.6 mG (Sugar-Free) Lozenge 1 Lozenge Oral three times a day PRN Sore Throat  bisacodyl Suppository 10 milliGRAM(s) Rectal daily PRN Constipation  hydrocortisone 2.5% Lotion 1 Application(s) Topical every 12 hours PRN Rash and/or Itching  lactulose Syrup 10 Gram(s) Oral daily PRN constipation  oxyCODONE    IR 5 milliGRAM(s) Oral every 4 hours PRN Moderate Pain (4 - 6)  oxyCODONE    IR 10 milliGRAM(s) Oral every 4 hours PRN Severe Pain (7 - 10)  simethicone 80 milliGRAM(s) Chew three times a day PRN Gas                PHYSICAL EXAM:  T(C): 37.1 (02-27-20 @ 08:57), Max: 37.1 (02-27-20 @ 08:57)  HR: 58 (02-27-20 @ 08:57) (58 - 68)  BP: 117/66 (02-27-20 @ 08:57) (110/59 - 137/81)  RR: 16 (02-27-20 @ 08:57) (15 - 16)  SpO2: 98% (02-27-20 @ 08:57) (96% - 98%)    I&O's Summary    26 Feb 2020 07:01  -  27 Feb 2020 07:00  --------------------------------------------------------  IN: 360 mL / OUT: 0 mL / NET: 360 mL                LABS:                        8.5    10.64 )-----------( 432      ( 25 Feb 2020 14:45 )             25.5     02-25    137  |  98  |  40<H>  ----------------------------<  96  4.3   |  29  |  6.56<H>    Ca    8.6      25 Feb 2020 14:45  Phos  5.5     02-25    TPro  x   /  Alb  2.3<L>  /  TBili  x   /  DBili  x   /  AST  x   /  ALT  x   /  AlkPhos  x   02-25        RADIOLOGY & ADDITIONAL TESTS:    Imaging Reviewed:  [x] YES  [ ] NO    Consultant(s) Notes Reviewed:  [x] YES  [ ] NO    Care Discussed with Consultants/Other Providers [x] YES  [ ] NO Patient is a 49 year old male who presents with a chief complaint of Debility (27 Feb 2020 10:21)    Patient seen and examined earlier, reports his pain is controlled.       MEDICATIONS  (STANDING):  allopurinol 100 milliGRAM(s) Oral daily  apixaban 5 milliGRAM(s) Oral two times a day  AQUAPHOR (petrolatum Ointment) 1 Application(s) Topical three times a day  brimonidine 0.2% Ophthalmic Solution 1 Drop(s) Both EYES three times a day  chlorhexidine 4% Liquid 1 Application(s) Topical daily  epoetin trey Injectable 00955 Unit(s) IV Push <User Schedule>  latanoprost 0.005% Ophthalmic Solution 1 Drop(s) Both EYES daily  lidocaine 2% Gel 1 Application(s) Topical two times a day  metoprolol tartrate 25 milliGRAM(s) Oral every 12 hours  Nephro-candace 1 Tablet(s) Oral daily  nystatin    Suspension 545559 Unit(s) Oral four times a day  polyethylene glycol 3350 17 Gram(s) Oral daily  saccharomyces boulardii 250 milliGRAM(s) Oral two times a day  senna 2 Tablet(s) Oral at bedtime    MEDICATIONS  (PRN):  acetaminophen   Tablet .. 975 milliGRAM(s) Oral every 6 hours PRN Mild Pain (1 - 3)  benzocaine 15 mG/menthol 3.6 mG (Sugar-Free) Lozenge 1 Lozenge Oral three times a day PRN Sore Throat  bisacodyl Suppository 10 milliGRAM(s) Rectal daily PRN Constipation  hydrocortisone 2.5% Lotion 1 Application(s) Topical every 12 hours PRN Rash and/or Itching  lactulose Syrup 10 Gram(s) Oral daily PRN constipation  oxyCODONE    IR 5 milliGRAM(s) Oral every 4 hours PRN Moderate Pain (4 - 6)  oxyCODONE    IR 10 milliGRAM(s) Oral every 4 hours PRN Severe Pain (7 - 10)  simethicone 80 milliGRAM(s) Chew three times a day PRN Gas          REVIEW OF SYSTEMS:  CONSTITUTIONAL: No fever, weight loss, has fatigue  EYES: No eye pain, visual disturbances, or discharge  ENMT:  No difficulty hearing, tinnitus, vertigo; No sinus or throat pain  NECK: No pain or stiffness  RESPIRATORY: No cough, wheezing, chills or hemoptysis; No shortness of breath  CARDIOVASCULAR: No chest pain, palpitations, dizziness, or leg swelling  GASTROINTESTINAL: No abdominal or epigastric pain. No nausea, vomiting, or hematemesis; No diarrhea or constipation. No melena or hematochezia.  GENITOURINARY: No dysuria, frequency, hematuria, or incontinence  NEUROLOGICAL: No headaches, memory loss, loss of strength, numbness, or tremors  SKIN: No itching, burning, rashes, or lesions   ENDOCRINE: No heat or cold intolerance; No hair loss  MUSCULOSKELETAL: No joint pain or swelling; No muscle, back, or extremity pain  PSYCHIATRIC: No depression, anxiety, mood swings, or difficulty sleeping  HEME/LYMPH: No easy bruising, or bleeding gums  ALLERGY AND IMMUNOLOGIC: No hives or eczema          PHYSICAL EXAM:  T(C): 37.1 (02-27-20 @ 08:57), Max: 37.1 (02-27-20 @ 08:57)  HR: 58 (02-27-20 @ 08:57) (58 - 68)  BP: 117/66 (02-27-20 @ 08:57) (110/59 - 137/81)  RR: 16 (02-27-20 @ 08:57) (15 - 16)  SpO2: 98% (02-27-20 @ 08:57) (96% - 98%)    I&O's Summary    26 Feb 2020 07:01  -  27 Feb 2020 07:00  --------------------------------------------------------  IN: 360 mL / OUT: 0 mL / NET: 360 mL      GENERAL: NAD  HEAD:  Atraumatic, Normocephalic  EYES: EOMI, PERRL, conjunctiva and sclera clear  ENMT: moist mucous membranes  NECK: Supple, No JVD, Normal thyroid  NERVOUS SYSTEM:  Alert & Oriented X3, no new deficit  CHEST/LUNG: Clear to ascultation bilaterally; No rales, rhonchi, wheezing, or rubs  HEART: Regular rate and rhythm; No murmurs, rubs, or gallops  ABDOMEN: Soft, Nontender, Nondistended; Bowel sounds present  EXTREMITIES:  2+ Peripheral Pulses, No clubbing, cyanosis, or edema  SKIN: No rash          LABS:                        8.5    10.64 )-----------( 432      ( 25 Feb 2020 14:45 )             25.5     02-25    137  |  98  |  40<H>  ----------------------------<  96  4.3   |  29  |  6.56<H>    Ca    8.6      25 Feb 2020 14:45  Phos  5.5     02-25    TPro  x   /  Alb  2.3<L>  /  TBili  x   /  DBili  x   /  AST  x   /  ALT  x   /  AlkPhos  x   02-25        RADIOLOGY & ADDITIONAL TESTS:    Imaging Reviewed:  [x] YES  [ ] NO    Consultant(s) Notes Reviewed:  [x] YES  [ ] NO    Care Discussed with Consultants/Other Providers [x] YES  [ ] NO

## 2020-02-27 NOTE — PROGRESS NOTE ADULT - ASSESSMENT
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 His course was further complicated by being cardiac arrest in setting of saddle PE. Additionally he was noted to have LUE AVF bleeding due to brachial artery pseudoaneurysm that underwent repair. Additionally he was noted to have an acute DVT of the RUE brachial vein as well. He is currently admitted to [facility] for acute rehabilitation of his functional deficits.  Rehab course complicated by prolongation of his QTc    DVT/PE- continue eliquis, s/p IVC filter    Atrial Fibrillation -  amiodarone was dced, continue metoprolol, Eliquis, check EKG, d/w primary team    Hypertension - continue metoprolol, QT prolongation suspect medication induced, CARDIO CONSULT NOTED, check EKG, d/w primary team    Elevated TSH - REPEAT IN 1 WEEK     ESRD on HD (normally T-Th-Sat)- Nephrology following - renal diet    ANEMIA- epogen with HD    Perinephritic Abscess - cipro was dc/ed due to QTC prolongation, ID REC NOTED    Gout - allopurinol    Leukocytosis - trending down, likely in setting of resolving infection    Thrombocytosis - likely reactive    LUE Hematoma - continue to monitor closely given anticoagulation usage, surgery recommendations appreciated    DVT prophylaxis:  is on Eliquis 49 year-old male with h/o ESRD on HD (via permacath), HTN, Gout, Glaucoma, 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 His course was further complicated by being cardiac arrest in setting of saddle PE. Additionally he was noted to have LUE AVF bleeding due to brachial artery pseudoaneurysm that underwent repair. Additionally he was noted to have an acute DVT of the RUE brachial vein as well. He is currently admitted to [facility] for acute rehabilitation of his functional deficits.  Rehab course complicated by prolongation of his QTc    DVT/PE - continue eliquis, s/p IVC filter    Atrial Fibrillation -  amiodarone was dced, continue metoprolol, Eliquis, check EKG, d/w primary team    Hypertension - continue metoprolol, QT prolongation suspect medication induced, CARDIO CONSULT NOTED, check EKG, d/w primary team    Elevated TSH - REPEAT IN 1 WEEK     ESRD on HD (normally T-Th-Sat)- Nephrology following - renal diet    ANEMIA- epogen with HD    Perinephritic Abscess - cipro was dc/ed due to QTC prolongation, ID REC NOTED    Gout - allopurinol    Leukocytosis - trending down, likely in setting of resolving infection    Thrombocytosis - likely reactive    LUE Hematoma - continue to monitor closely given anticoagulation usage, surgery recommendations appreciated    DVT prophylaxis:  is on Eliquis

## 2020-02-28 LAB
HBV CORE AB SER-ACNC: SIGNIFICANT CHANGE UP
HBV SURFACE AB SER-ACNC: 812.6 MIU/ML — SIGNIFICANT CHANGE UP
HBV SURFACE AG SER-ACNC: SIGNIFICANT CHANGE UP
HCV AB S/CO SERPL IA: 0.16 S/CO — SIGNIFICANT CHANGE UP (ref 0–0.99)
HCV AB SERPL-IMP: SIGNIFICANT CHANGE UP

## 2020-02-28 PROCEDURE — 99232 SBSQ HOSP IP/OBS MODERATE 35: CPT

## 2020-02-28 PROCEDURE — 99232 SBSQ HOSP IP/OBS MODERATE 35: CPT | Mod: GC

## 2020-02-28 RX ADMIN — Medication 1 TABLET(S): at 12:07

## 2020-02-28 RX ADMIN — Medication 250 MILLIGRAM(S): at 05:00

## 2020-02-28 RX ADMIN — OXYCODONE HYDROCHLORIDE 5 MILLIGRAM(S): 5 TABLET ORAL at 13:00

## 2020-02-28 RX ADMIN — CHLORHEXIDINE GLUCONATE 1 APPLICATION(S): 213 SOLUTION TOPICAL at 12:15

## 2020-02-28 RX ADMIN — Medication 100 MILLIGRAM(S): at 12:06

## 2020-02-28 RX ADMIN — Medication 500000 UNIT(S): at 18:15

## 2020-02-28 RX ADMIN — Medication 250 MILLIGRAM(S): at 18:15

## 2020-02-28 RX ADMIN — OXYCODONE HYDROCHLORIDE 5 MILLIGRAM(S): 5 TABLET ORAL at 05:00

## 2020-02-28 RX ADMIN — OXYCODONE HYDROCHLORIDE 5 MILLIGRAM(S): 5 TABLET ORAL at 12:10

## 2020-02-28 RX ADMIN — APIXABAN 5 MILLIGRAM(S): 2.5 TABLET, FILM COATED ORAL at 05:01

## 2020-02-28 RX ADMIN — LATANOPROST 1 DROP(S): 0.05 SOLUTION/ DROPS OPHTHALMIC; TOPICAL at 12:07

## 2020-02-28 RX ADMIN — Medication 500000 UNIT(S): at 05:01

## 2020-02-28 RX ADMIN — TUBERCULIN PURIFIED PROTEIN DERIVATIVE 5 UNIT(S): 5 INJECTION, SOLUTION INTRADERMAL at 12:45

## 2020-02-28 RX ADMIN — Medication 25 MILLIGRAM(S): at 18:15

## 2020-02-28 RX ADMIN — BRIMONIDINE TARTRATE 1 DROP(S): 2 SOLUTION/ DROPS OPHTHALMIC at 15:19

## 2020-02-28 RX ADMIN — BRIMONIDINE TARTRATE 1 DROP(S): 2 SOLUTION/ DROPS OPHTHALMIC at 05:01

## 2020-02-28 RX ADMIN — APIXABAN 5 MILLIGRAM(S): 2.5 TABLET, FILM COATED ORAL at 18:15

## 2020-02-28 RX ADMIN — Medication 25 MILLIGRAM(S): at 05:01

## 2020-02-28 RX ADMIN — Medication 500000 UNIT(S): at 12:06

## 2020-02-28 RX ADMIN — Medication 1 APPLICATION(S): at 15:21

## 2020-02-28 NOTE — CHART NOTE - NSCHARTNOTEFT_GEN_A_CORE
NUTRITION FOLLOW UP    SOURCE: Patient [X)   Family [ ]    Medical Record (X)    DIET: Renal, 800ml/day fluid restriction     Pt reports that he ate really well at breakfast this morning- states that he had a very large portion of eggs, so he still felt full by the time lunch came. Observed untouched lunch tray at bedside. Encouraged pt to try to eat as much of lunch as he can before going to physical therapy. Pt previously declined Nepro shake as he dislikes. Pt was familiar with fluid restrictions parameters (only drinks tea with meals). Encouraged pt to never skip meals and consume at least 50% of trays. Pt states that he is motivated to try. Food preferences obtained.     CURRENT WEIGHT:    (2/20) 178.5lbs   (2/21) 169.5lbs   (2/22) 161.1lbs   (2/24) 160.9lbs  (2/27) 157.8lbs   +3 edema noted on 2/21, now noted with +1 left arm, +2 bilateral feet possibly contributing to weight change    PERTINENT MEDS:   Pertinent Medications: MEDICATIONS  (STANDING):  allopurinol 100 milliGRAM(s) Oral daily  apixaban 5 milliGRAM(s) Oral two times a day  AQUAPHOR (petrolatum Ointment) 1 Application(s) Topical three times a day  brimonidine 0.2% Ophthalmic Solution 1 Drop(s) Both EYES three times a day  chlorhexidine 4% Liquid 1 Application(s) Topical daily  epoetin trey Injectable 25323 Unit(s) IV Push <User Schedule>  latanoprost 0.005% Ophthalmic Solution 1 Drop(s) Both EYES daily  lidocaine 2% Gel 1 Application(s) Topical two times a day  metoprolol tartrate 25 milliGRAM(s) Oral every 12 hours  Nephro-candace 1 Tablet(s) Oral daily  nystatin    Suspension 513671 Unit(s) Oral four times a day  polyethylene glycol 3350 17 Gram(s) Oral daily  PPD  5 Tuberculin Unit(s) Injectable 5 Unit(s) IntraDermal once  saccharomyces boulardii 250 milliGRAM(s) Oral two times a day  senna 2 Tablet(s) Oral at bedtime    MEDICATIONS  (PRN):  acetaminophen   Tablet .. 975 milliGRAM(s) Oral every 6 hours PRN Mild Pain (1 - 3)  benzocaine 15 mG/menthol 3.6 mG (Sugar-Free) Lozenge 1 Lozenge Oral three times a day PRN Sore Throat  bisacodyl Suppository 10 milliGRAM(s) Rectal daily PRN Constipation  hydrocortisone 2.5% Lotion 1 Application(s) Topical every 12 hours PRN Rash and/or Itching  lactulose Syrup 10 Gram(s) Oral daily PRN constipation  oxyCODONE    IR 5 milliGRAM(s) Oral every 4 hours PRN Moderate Pain (4 - 6)  oxyCODONE    IR 10 milliGRAM(s) Oral every 4 hours PRN Severe Pain (7 - 10)  oxyCODONE    IR 5 milliGRAM(s) Oral every 4 hours PRN Moderate Pain (4 - 6)  oxyCODONE    IR 10 milliGRAM(s) Oral every 4 hours PRN Severe Pain (7 - 10)  simethicone 80 milliGRAM(s) Chew three times a day PRN Gas      PERTINENT LABS:  02-27 Na137 mmol/L Glu 93 mg/dL K+ 3.7 mmol/L Cr  6.48 mg/dL<H> BUN 35 mg/dL<H> 02-27 Phos 4.6 mg/dL<H> 02-27 Alb 2.3 g/dL<L>    SKIN:  venous ulcers  EDEMA: +1 edema left arm, +2 bilateral feet   LAST BM: 2/27     ESTIMATED NEEDS:   [X] no change since previous assessment  [ ] recalculated:     PREVIOUS NUTRITION DIAGNOSIS:    1. Inadequate oral intake- pt previously declined Nepro supplements. Declines doubles protein portions. PO intake encouraged (at least 50% at meals). Pt eats best at bfast. Food preferences obtained.     NUTRITION DIAGNOSIS is :  (X)  Ongoing     NEW NUTRITION DIAGNOSIS: N/A    NUTRITION RECOMMENDATIONS:   1. Recommend continue current nutrition plan of care  2. Obtain and honor food preferences as able  3. Weights TIW with HD    MONITORING AND EVALUATION:   1. Tolerance to diet prescription   2. PO intake  3. Weights  4. Labs  5. Follow Up per protocol     RD to remain available   Melissa Williamson RDN   Pager #[phone]

## 2020-02-28 NOTE — PROGRESS NOTE ADULT - ASSESSMENT
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 Young, followed by Dr. Raphael, [facility] Oncologist), RA with hand contractures, DDRT 12/8/18 c/b DGF requiring who presented to [facility] on 12/27/19 from dialysis for a fever found to be flu positive treated with tamiflu and have enterobacter infected perinephretic hematoma being admitted here  with Gait Instability, ADL impairments and Functional impairments. His course was complicated by RP hematoma and bladder perforation s/p repair and evacuation on 1/10, saddle PE s/p IVC filter on 1/10, acute DVT of RUE brachial vein, LUE AVF bleeding left deep brachial artery psuedoaneurysm, and cardiac arrest.     #Debility due to prolonged and complicated hospital course  -continue Comprehensive Rehab Program of PT/OT  -restorative dining added due to difficulty with container opening/hand use  -neuropsychology for support due to anxiety    #DDRT: c/b DGF/ATN/perinephric infected hematoma/pseudoneurysm/graft nephrectomy   - S/P AVF cannulation for HD c/b infiltration causing extravasation/bleeding to the left upper arm and chest noted on CTA  - S/P IR LUE arteriography showing a tiny pseudoaneurysm arising from a distal branch of the left deep brachial artery s/p dissection of a tiny feeder artery that resulted in resolution of the pseudoaneurysm.  - S/P IR permacath placement with TTS HD  - Eliquis 5mg BID started 2/12 and hold Aspirin    - Unable to continue with ciprofloxacin for abx course due to prolonged Qtc. ID consulted and completed one dose of amikacin 500mg after dialysis on 2/18 in order to complete abx course. Per ID, no need for additional antibiotics.  - pain control: Tylenol, Oxycodone. Neurontin discontinued on 2/15 due to prolonged QTc  - Wound care consult  appreciated 2/14  -Follow up visit by Dr Polo 2/26, Vascular surgery as well: recs to continue local wound care and to not use left arm AVF for 6-8 weeks.   - Thigh high compression stocking/ACE wrap to bilateral LE    - ACE wrap LUE from hand to shoulder  - Nephrology following    #Saddle PE/multiple DVT: RUE brachial DVT,  Right distal brachia vein , acute DVT of Right external iliac, common femoral, femoral veins, Right soleal vein   - S/p IVC filter on 1/9  - Apixaban 5mg BID  -monitor H/H, cardiopulmonary status      #New Afib/Vtach:  -Continue metoprolol  -Patient with prolonged QTC. Seen by hospitalist and cardiology 2/15:  ·	DC cipro and gabapentin  ·	serial ekgs-> 2/23 still prolonged QTC  Cardiology consulted and discontinued amiodarone   -Repeat EKG pending-hospitalist following     #Amiodarone toxicity: rule out per cardiology. LFTs stable. TSH 15  -Follow up CXR-possible fluid overload but might be bc patient was in dyalysis       #Elevated TSH  TSH 15  Total T3 66  Free thyroxine 1.5  T4 8.9   Per hospitalist repeat TSH in 1 week, ?reactive       #HTN:  -Continue metoprolol and hold on HD days per outside hospital    #ESRD: on HD  -Nephrology consult.on T/Th/Sat  -permacath care  -Epogen, nephrovite    #Glaucoma:  -Continue with drops    #Gout:  -Allopurinol    #RA:  -Continue with comprehensive rehab  -Continue with pain control as below  -rheumatology consult, appreciate recs    #Gas: improved   -simethicone prn    #Thrombocytosis:  -Monitor labs  -Hematology follow up as an outpatient    #Hyponatremia: Resolved  -Monitor labs    #Anemia: stable   -Continue with epogen  -Monitor labs    #Pain Mgmt   - Tylenol PRN, Oxycodone PRN. DC neurontin given QTc prolongation    #GI/Bowel Mgmt  -  Continent c/w  Senna,  Miralax  - Continue lactulose PRN    #/Bladder Mgmt -   HD    #FEN   - Diet - Regular + Thins, Renal Restrictions  - DC famotidine due to prolonged Qtc  -nutrition consult    Dispo:  Plan for discharge 3/3/20 with goals of min to mod assist.    IDT: 2/27/2020:   Barriers to home from nursing perspective: Wound care, HD (T/Th/Sat)  CG for transfers   Sit to sat mod A for tub bench, refused to practice slide on tub bench due to pain  CG for commode transfers   Mod A lower body dressing   Ambulating 100ft CG with RW   4 steps with 1 rail min to mod A   Barriers for d/c per therapists: low endurance, foot pain, shoes in closet are too small to wear during therapies, 2 KATHYA without rail   Tentative date for d/c to home 3/3/2020  Social work to arrange for family training, follow up with patient regarding getting railing installed to enter the home ASSESSMENT/PLAN  49M with PMHx of ESRD on HD ( via LUE AVF), HTN, Gout, Glaucoma, 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 Young, followed by Dr. Raphael, [facility] Oncologist), RA with hand contractures, DDRT 12/8/18 c/b DGF requiring who presented to [facility] on 12/27/19 from dialysis for a fever found to be flu positive treated with tamiflu and have enterobacter infected perinephretic hematoma being admitted here  with Gait Instability, ADL impairments and Functional impairments. His course was complicated by RP hematoma and bladder perforation s/p repair and evacuation on 1/10, saddle PE s/p IVC filter on 1/10, acute DVT of RUE brachial vein, LUE AVF bleeding left deep brachial artery psuedoaneurysm, and cardiac arrest.     #Debility due to prolonged and complicated hospital course  -continue Comprehensive Rehab Program of PT/OT  -restorative dining added due to difficulty with container opening/hand use  -neuropsychology for support due to anxiety  -continue rehab efforts     #DDRT: c/b DGF/ATN/perinephric infected hematoma/pseudoneurysm/graft nephrectomy   - S/P AVF cannulation for HD c/b infiltration causing extravasation/bleeding to the left upper arm and chest noted on CTA  - S/P IR LUE arteriography showing a tiny pseudoaneurysm arising from a distal branch of the left deep brachial artery s/p dissection of a tiny feeder artery that resulted in resolution of the pseudoaneurysm.  - S/P IR permacath placement with TTS HD  - Eliquis 5mg BID started 2/12 and hold Aspirin    - Unable to continue with ciprofloxacin for abx course due to prolonged Qtc. ID consulted and completed one dose of amikacin 500mg after dialysis on 2/18 in order to complete abx course. Per ID, no need for additional antibiotics.  - pain control: Tylenol, Oxycodone. Neurontin discontinued on 2/15 due to prolonged QTc  - Wound care consult  appreciated 2/14  -Follow up visit by Dr Polo 2/26, Vascular surgery as well: recs to continue local wound care and to not use left arm AVF for 6-8 weeks.   - Thigh high compression stocking/ACE wrap to bilateral LE    - ACE wrap LUE from hand to shoulder  - Nephrology following    #Saddle PE/multiple DVT: RUE brachial DVT,  Right distal brachia vein , acute DVT of Right external iliac, common femoral, femoral veins, Right soleal vein   - S/p IVC filter on 1/9  - Apixaban 5mg BID  -monitor H/H, cardiopulmonary status      #New Afib/Vtach:  -Continue metoprolol  -Patient with prolonged QTC. Seen by hospitalist and cardiology 2/15:  ·	DC cipro and gabapentin  ·	serial ekgs-> 2/23 still prolonged QTC  Cardiology consulted and discontinued amiodarone   -Repeat EKG pending-hospitalist following     #Amiodarone toxicity: rule out per cardiology. LFTs stable. TSH 15  -Follow up CXR-possible fluid overload but might be bc patient was in dyalysis       #Elevated TSH  TSH 15  Total T3 66  Free thyroxine 1.5  T4 8.9   Per hospitalist repeat TSH in 1 week, ?reactive       #HTN:  -Continue metoprolol and hold on HD days per outside hospital    #ESRD: on HD  -Nephrology consult.on T/Th/Sat  -permacath care  -Epogen, nephrovite    #Glaucoma:  -Continue with drops    #Gout:  -Allopurinol    #RA:  -Continue with comprehensive rehab  -Continue with pain control as below  -rheumatology consult, appreciate recs    #Thrombocytosis:  -Monitor labs  -Hematology follow up as an outpatient    #Hyponatremia: Resolved  -Monitor labs    #Anemia: stable   -Continue with epogen  -Monitor labs    #Pain Mgmt   - Tylenol PRN, Oxycodone PRN. DC neurontin given QTc prolongation    #GI/Bowel Mgmt  -  Continent c/w  Senna,  Miralax  - Continue lactulose PRN    #/Bladder Mgmt -   HD, voids small amounts     #FEN   - Diet - Regular + Thins, Renal Restrictions  - DC famotidine due to prolonged Qtc      Dispo:  Plan for discharge 3/3/20 with goals of min to mod assist.    IDT: 2/27/2020:   Barriers to home from nursing perspective: Wound care, HD (T/Th/Sat)  CG for transfers   Sit to sat mod A for tub bench, refused to practice slide on tub bench due to pain  CG for commode transfers   Mod A lower body dressing   Ambulating 100ft CG with RW   4 steps with 1 rail min to mod A   Barriers for d/c per therapists: low endurance, foot pain, shoes in closet are too small to wear during therapies, 2 KATHYA without rail   Tentative date for d/c to home 3/3/2020  Social work to arrange for family training, follow up with patient regarding getting railing installed to enter the home

## 2020-02-28 NOTE — PROGRESS NOTE ADULT - SUBJECTIVE AND OBJECTIVE BOX
Patient is a 49 year old male who presents with a chief complaint of Debility (28 Feb 2020 09:13)    Patient seen and examined earlier today, reports he had EKG yesterday, and that his family is going to come to learn about wound care and dressing change today.       MEDICATIONS  (STANDING):  allopurinol 100 milliGRAM(s) Oral daily  apixaban 5 milliGRAM(s) Oral two times a day  AQUAPHOR (petrolatum Ointment) 1 Application(s) Topical three times a day  brimonidine 0.2% Ophthalmic Solution 1 Drop(s) Both EYES three times a day  chlorhexidine 4% Liquid 1 Application(s) Topical daily  epoetin trey Injectable 26980 Unit(s) IV Push <User Schedule>  latanoprost 0.005% Ophthalmic Solution 1 Drop(s) Both EYES daily  lidocaine 2% Gel 1 Application(s) Topical two times a day  metoprolol tartrate 25 milliGRAM(s) Oral every 12 hours  Nephro-candace 1 Tablet(s) Oral daily  nystatin    Suspension 265241 Unit(s) Oral four times a day  polyethylene glycol 3350 17 Gram(s) Oral daily  PPD  5 Tuberculin Unit(s) Injectable 5 Unit(s) IntraDermal once  saccharomyces boulardii 250 milliGRAM(s) Oral two times a day  senna 2 Tablet(s) Oral at bedtime    MEDICATIONS  (PRN):  acetaminophen   Tablet .. 975 milliGRAM(s) Oral every 6 hours PRN Mild Pain (1 - 3)  benzocaine 15 mG/menthol 3.6 mG (Sugar-Free) Lozenge 1 Lozenge Oral three times a day PRN Sore Throat  bisacodyl Suppository 10 milliGRAM(s) Rectal daily PRN Constipation  hydrocortisone 2.5% Lotion 1 Application(s) Topical every 12 hours PRN Rash and/or Itching  lactulose Syrup 10 Gram(s) Oral daily PRN constipation  oxyCODONE    IR 5 milliGRAM(s) Oral every 4 hours PRN Moderate Pain (4 - 6)  oxyCODONE    IR 10 milliGRAM(s) Oral every 4 hours PRN Severe Pain (7 - 10)  oxyCODONE    IR 5 milliGRAM(s) Oral every 4 hours PRN Moderate Pain (4 - 6)  oxyCODONE    IR 10 milliGRAM(s) Oral every 4 hours PRN Severe Pain (7 - 10)  simethicone 80 milliGRAM(s) Chew three times a day PRN Gas        REVIEW OF SYSTEMS:  CONSTITUTIONAL: No fever, weight loss, has fatigue  EYES: No eye pain, visual disturbances, or discharge  ENMT:  No difficulty hearing, tinnitus, vertigo; No sinus or throat pain  NECK: No pain or stiffness  RESPIRATORY: No cough, wheezing, chills or hemoptysis; No shortness of breath  CARDIOVASCULAR: No chest pain, palpitations, dizziness, or leg swelling  GASTROINTESTINAL: No abdominal or epigastric pain. No nausea, vomiting, or hematemesis; No diarrhea or constipation. No melena or hematochezia.  GENITOURINARY: No dysuria, frequency, hematuria, or incontinence  NEUROLOGICAL: No headaches, memory loss, loss of strength, numbness, or tremors  SKIN: No itching, burning, rashes, or lesions   ENDOCRINE: No heat or cold intolerance; No hair loss  MUSCULOSKELETAL: No joint pain or swelling; No muscle, back, or extremity pain  PSYCHIATRIC: No depression, anxiety, mood swings, or difficulty sleeping  HEME/LYMPH: No easy bruising, or bleeding gums  ALLERGY AND IMMUNOLOGIC: No hives or eczema          PHYSICAL EXAM:    T(C): 36.8 (02-28-20 @ 08:05), Max: 37.2 (02-27-20 @ 14:21)  HR: 57 (02-28-20 @ 08:05) (57 - 76)  BP: 123/69 (02-28-20 @ 08:05) (101/77 - 154/78)  RR: 14 (02-28-20 @ 08:05) (14 - 16)  SpO2: 100% (02-28-20 @ 08:05) (95% - 100%)    I&O's Summary    27 Feb 2020 07:01  -  28 Feb 2020 07:00  --------------------------------------------------------  IN: 0 mL / OUT: 3000 mL / NET: -3000 mL        GENERAL: NAD  HEAD:  Atraumatic, Normocephalic  EYES: EOMI, PERRL, conjunctiva and sclera clear  ENMT: moist mucous membranes  NECK: Supple, No JVD, Normal thyroid  NERVOUS SYSTEM:  Alert & Oriented X3, no new deficit  CHEST/LUNG: Clear to ascultation bilaterally; No rales, rhonchi, wheezing, or rubs  HEART: Regular rate and rhythm; No murmurs, rubs, or gallops  ABDOMEN: Soft, Nontender, Nondistended; Bowel sounds present  EXTREMITIES:  2+ Peripheral Pulses, No clubbing, cyanosis, or edema  SKIN: No rash          LABS:                        8.7    10.67 )-----------( 431      ( 27 Feb 2020 15:00 )             26.0     02-27    137  |  97  |  35<H>  ----------------------------<  93  3.7   |  29  |  6.48<H>    Ca    8.5      27 Feb 2020 15:00  Phos  4.6     02-27    TPro  7.3  /  Alb  2.3<L>  /  TBili  1.5<H>  /  DBili  x   /  AST  36  /  ALT  9<L>  /  AlkPhos  210<H>  02-27      RADIOLOGY & ADDITIONAL TESTS:    Imaging Reviewed:  [x] YES  [ ] NO    Consultant(s) Notes Reviewed:  [x] YES  [ ] NO    Care Discussed with Consultants/Other Providers [x] YES  [ ] NO

## 2020-02-28 NOTE — PROGRESS NOTE ADULT - ASSESSMENT
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 His course was further complicated by being cardiac arrest in setting of saddle PE. Additionally he was noted to have LUE AVF bleeding due to brachial artery pseudoaneurysm that underwent repair. Additionally he was noted to have an acute DVT of the RUE brachial vein as well. He is currently admitted to [facility] for acute rehabilitation of his functional deficits.  Rehab course complicated by prolongation of his QTc    DVT/PE- continue eliquis, s/p IVC filter    Atrial Fibrillation -  amiodarone was dced, continue metoprolol, Eliquis, check EKG, d/w primary team    Hypertension - continue metoprolol, QT prolongation suspect medication induced, CARDIO CONSULT NOTED, check EKG, d/w primary team    Elevated TSH - REPEAT IN 1 WEEK     ESRD on HD (normally T-Th-Sat)- Nephrology following - renal diet    ANEMIA- epogen with HD    Perinephritic Abscess - cipro was dc/ed due to QTC prolongation, ID REC NOTED    Gout - allopurinol    Leukocytosis - trending down, likely in setting of resolving infection    Thrombocytosis - likely reactive    LUE Hematoma - continue to monitor closely given anticoagulation usage, surgery recommendations appreciated    DVT prophylaxis:  is on Eliquis 49 year-old male with h/o ESRD on HD (via permacath), HTN, Gout, Glaucoma, 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 His course was further complicated by being cardiac arrest in setting of saddle PE. Additionally he was noted to have LUE AVF bleeding due to brachial artery pseudoaneurysm that underwent repair. Additionally he was noted to have an acute DVT of the RUE brachial vein as well. He is currently admitted to [facility] for acute rehabilitation of his functional deficits.  Rehab course complicated by prolongation of his QTc    DVT/PE- continue eliquis, s/p IVC filter    Atrial Fibrillation -  amiodarone was dced, continue metoprolol, Eliquis, had EKG yesterday, reviewed    Hypertension - continue metoprolol, QT prolongation suspect medication induced, CARDIO CONSULT NOTED, had EKG yesterday, QTc improved  Elevated TSH - REPEAT IN 1 WEEK     ESRD on HD (normally T-Th-Sat)- Nephrology following - renal diet    ANEMIA- epogen with HD    Perinephritic Abscess - cipro was dc/ed due to QTC prolongation, ID REC NOTED    Gout - allopurinol    Leukocytosis - trending down, likely in setting of resolving infection    Thrombocytosis - likely reactive    LUE Hematoma - continue to monitor closely given anticoagulation usage, surgery recommendations appreciated    DVT prophylaxis -  is on Eliquis

## 2020-02-28 NOTE — PROGRESS NOTE ADULT - SUBJECTIVE AND OBJECTIVE BOX
Patient is a 49y old  Male who presents with a chief complaint of Debility (20 Feb 2020 19:07)          Review of Systems:   · Additional ROS	No acute events overnight. Patient reports that he is overall doing well. Tolerating therapies. States that he isn't wearing his shoes due to the wounds on his feet.  Pain controlled except reports pain to the bottom of his feet and toes. Denies HA, dizziness, CP, SOB, abdominal pain, constipation. LBM yesterday. Still voids small amounts. No dysuria  	    Physical Exam:   · Constitutional	detailed exam	  · Constitutional Comments	alert O x 3.	  · Respiratory	detailed exam	  · Respiratory Details	breathing comfortably on room air, no increased work of breathing. CTA 	  · Respiratory Comments	fair effort, cath site clean chest wall right, no swelling or warmth no TTP	  · Cardiovascular	detailed exam	  · Cardiovascular Details	regular rate and rhythm	  · Cardiovascular Comments	warm, well perfused	  · Gastrointestinal	detailed exam	  · GI Normal	normal; soft; nontender, dressing to right lower quadrant C/D/I 	  · Extremities	detailed exam	  · Extremities Comments	good color distally, wiggles toes 1+ LE swelling RUE 1+ swelling Pain with palpation to bottom of feet and dorsum aspect of toes bilaterally  Two healing wound to the dorsum of left foot:proximal wound measures about 1x2 cm with healthy pink tissue to center of wound (found after removal of slough) and margins showing signs of epithelization, distal wound measures about 0.75 x 1 with healthy pink tissue and margins showing signs of epithelization. Wound to dorsum of right foot measures about 1x2cm with dry slough noted to wound bed.  Dressing to left axilla c/d/i 	    Functional Exam:   CG for transfers   Sit to sat mod A for tub bench, refused to practice slide on tub bench due to pain  CG for commode transfers   Mod A lower body dressing   Ambulating 100ft CG with RW   4 steps with 1 rail min to mod A           Recent labs/tests:     02-27    137  |  97  |  35<H>  ----------------------------<  93  3.7   |  29  |  6.48<H>    Ca    8.5      27 Feb 2020 15:00  Phos  4.6     02-27    TPro  7.3  /  Alb  2.3<L>  /  TBili  1.5<H>  /  DBili  x   /  AST  36  /  ALT  9<L>  /  AlkPhos  210<H>  02-27                        8.7    10.67 )-----------( 431      ( 27 Feb 2020 15:00 )             26.0         Ventricular Rate 68 BPM    Atrial Rate 68 BPM    P-R Interval 154 ms    QRS Duration 76 ms    Q-T Interval 460 ms    QTC Calculation(Bezet) 489 ms    P Axis 39 degrees    R Axis 53 degrees    T Axis -19 degrees    Diagnosis Line Normal sinus rhythm with sinus arrhythmia  T wave abnormality, consider anterior ischemia  Prolonged QT  Abnormal ECG  When compared with ECG of 26-FEB-2020 19:58,  No significant change was found  Confirmed by BAYRON MUÑOZ, BETTINA ÁLVAREZ ([id number]) on 2/28/2020 8:38:31 AM Patient is a 49y old  Male who presents with a chief complaint of Debility (20 Feb 2020 19:07)          Review of Systems:   · Additional ROS	No acute events overnight. Patient reports that he is overall doing well. Tolerating therapies. States that he isn't wearing his shoes due to the wounds on his feet.  Pain controlled except reports pain to the bottom of his feet and toes. Denies HA, dizziness, CP, SOB, abdominal pain, constipation. LBM yesterday. Still voids small amounts. No dysuria  	      Vital Signs Last 24 Hrs  T(C): 36.8 (28 Feb 2020 08:05), Max: 37.2 (27 Feb 2020 14:21)  T(F): 98.2 (28 Feb 2020 08:05), Max: 98.9 (27 Feb 2020 14:21)  HR: 57 (28 Feb 2020 08:05) (57 - 76)  BP: 123/69 (28 Feb 2020 08:05) (101/77 - 154/78)  RR: 14 (28 Feb 2020 08:05) (14 - 16)  SpO2: 100% (28 Feb 2020 08:05) (95% - 100%)          Physical Exam:   · Constitutional	detailed exam	  · Constitutional Comments	alert O x 3.	  · Respiratory	detailed exam	  · Respiratory Details	breathing comfortably on room air, no increased work of breathing. CTA 	  · Respiratory Comments	fair effort, cath site clean chest wall right, no swelling or warmth no TTP	  · Cardiovascular	detailed exam	  · Cardiovascular Details	regular rate and rhythm	  · Cardiovascular Comments	warm, well perfused	  · Gastrointestinal	detailed exam	  · GI Normal	normal; soft; nontender, dressing to right lower quadrant C/D/I 	  · Extremities	detailed exam	  · Extremities Comments	good color distally, wiggles toes 1+ LE swelling RUE 1+ swelling Pain with palpation to bottom of feet and dorsum aspect of toes bilaterally  Two healing wound to the dorsum of left foot:proximal wound measures about 1x2 cm with healthy pink tissue to center of wound (found after removal of slough) and margins showing signs of epithelization, distal wound measures about 0.75 x 1 with healthy pink tissue and margins showing signs of epithelization. Wound to dorsum of right foot measures about 1x2cm with dry slough noted to wound bed.  Dressing to left axilla c/d/i 	    Functional Exam:   CG for transfers   Sit to sat mod A for tub bench, refused to practice slide on tub bench due to pain  CG for commode transfers   Mod A lower body dressing   Ambulating 100ft CG with RW   4 steps with 1 rail min to mod A           Recent labs/tests:     02-27    137  |  97  |  35<H>  ----------------------------<  93  3.7   |  29  |  6.48<H>    Ca    8.5      27 Feb 2020 15:00  Phos  4.6     02-27    TPro  7.3  /  Alb  2.3<L>  /  TBili  1.5<H>  /  DBili  x   /  AST  36  /  ALT  9<L>  /  AlkPhos  210<H>  02-27                        8.7    10.67 )-----------( 431      ( 27 Feb 2020 15:00 )             26.0         Ventricular Rate 68 BPM    Atrial Rate 68 BPM    P-R Interval 154 ms    QRS Duration 76 ms    Q-T Interval 460 ms    QTC Calculation(Bezet) 489 ms    P Axis 39 degrees    R Axis 53 degrees    T Axis -19 degrees    Diagnosis Line Normal sinus rhythm with sinus arrhythmia  T wave abnormality, consider anterior ischemia  Prolonged QT  Abnormal ECG  When compared with ECG of 26-FEB-2020 19:58,  No significant change was found  Confirmed by BAYRON MUÑOZ, BETTINA ÁLVAREZ ([id number]) on 2/28/2020 8:38:31 AM Patient is a 49y old  Male who presents with a chief complaint of Debility (20 Feb 2020 19:07)          Review of Systems:   · Additional ROS	No acute events overnight..  Pain controlled pain. Denies HA, dizziness, CP, SOB, abdominal pain, constipation. Moving bowels regularly. Still voids small amounts. No dysuria Feeling well and making progress in therapy.  Son coming today for family training per patient.   	      Vital Signs Last 24 Hrs  T(C): 36.8 (28 Feb 2020 08:05), Max: 37.2 (27 Feb 2020 14:21)  T(F): 98.2 (28 Feb 2020 08:05), Max: 98.9 (27 Feb 2020 14:21)  HR: 57 (28 Feb 2020 08:05) (57 - 76)  BP: 123/69 (28 Feb 2020 08:05) (101/77 - 154/78)  RR: 14 (28 Feb 2020 08:05) (14 - 16)  SpO2: 100% (28 Feb 2020 08:05) (95% - 100%)          Physical Exam:   · Constitutional	detailed exam	  · Constitutional Comments	alert O x 3.	  · Respiratory	detailed exam	  · Respiratory Details	breathing comfortably on room air, no increased work of breathing. CTA 	  · Respiratory Comments	fair effort, cath site clean chest wall right, no swelling or warmth no TTP	  · Cardiovascular	detailed exam	  · Cardiovascular Details	regular rate and rhythm	  · Cardiovascular Comments	warm, well perfused	  · Gastrointestinal	detailed exam	  · GI Normal	normal; soft; nontender, dressing to right lower quadrant C/D/I 	  · Extremities	detailed exam	  · Extremities Comments	 1+ LE swelling-stable RUE 1+ swelling-stable Dressings to feet c/d/i Dressing to left axilla c/d/i 	    Functional Exam:   CG for transfers   Sit to sat mod A for tub bench, refused to practice slide on tub bench due to pain  CG for commode transfers   Mod A lower body dressing   Ambulating 100ft CG with RW   4 steps with 1 rail min to mod A           Recent labs/tests:     02-27    137  |  97  |  35<H>  ----------------------------<  93  3.7   |  29  |  6.48<H>    Ca    8.5      27 Feb 2020 15:00  Phos  4.6     02-27    TPro  7.3  /  Alb  2.3<L>  /  TBili  1.5<H>  /  DBili  x   /  AST  36  /  ALT  9<L>  /  AlkPhos  210<H>  02-27                        8.7    10.67 )-----------( 431      ( 27 Feb 2020 15:00 )             26.0         Ventricular Rate 68 BPM    Atrial Rate 68 BPM    P-R Interval 154 ms    QRS Duration 76 ms    Q-T Interval 460 ms    QTC Calculation(Bezet) 489 ms    P Axis 39 degrees    R Axis 53 degrees    T Axis -19 degrees    Diagnosis Line Normal sinus rhythm with sinus arrhythmia  T wave abnormality, consider anterior ischemia  Prolonged QT  Abnormal ECG  When compared with ECG of 26-FEB-2020 19:58,  No significant change was found  Confirmed by BAYRON MUÑOZ, BETTINA ÁLVAREZ ([id number]) on 2/28/2020 8:38:31 AM

## 2020-02-28 NOTE — PROGRESS NOTE ADULT - SUBJECTIVE AND OBJECTIVE BOX
[facility] NEPHROLOGY SERVICES, [facility]  NEPHROLOGY AND HYPERTENSION  [address]  [address]  [place] [address]  [phone]    MD GIANA GUERRERO, MD BEAN RAAUJO, MD JANAE VICTORIA, MD BARBARA MONET, MD DEANN GORDON, MD SALUD GENTILE, MD CATRINA WOO, MD    Resting    Vital Signs Last 24 Hrs  T(C): 36.8 (02-28-20 @ 08:05), Max: 37.1 (02-27-20 @ 21:17)  T(F): 98.2 (02-28-20 @ 08:05), Max: 98.7 (02-27-20 @ 21:17)  HR: 57 (02-28-20 @ 08:05) (57 - 76)  BP: 123/69 (02-28-20 @ 08:05) (123/69 - 154/78)  RR: 14 (02-28-20 @ 08:05) (14 - 16)  SpO2: 100% (02-28-20 @ 08:05) (95% - 100%)    Respiratory: decreased BS at bases  Cardiovascular: S1 S2  Gastrointestinal: soft NT ND +BS  Extremities: edema much improved                        8.7    10.67 )-----------( 431      ( 27 Feb 2020 15:00 )             26.0     27 Feb 2020 15:00    137    |  97     |  35     ----------------------------<  93     3.7     |  29     |  6.48     Ca    8.5        27 Feb 2020 15:00  Phos  4.6       27 Feb 2020 15:00    TPro  7.3    /  Alb  2.3    /  TBili  1.5    /  DBili  x      /  AST  36     /  ALT  9      /  AlkPhos  210    27 Feb 2020 15:00    LIVER FUNCTIONS - ( 27 Feb 2020 15:00 )  Alb: 2.3 g/dL / Pro: 7.3 g/dL / ALK PHOS: 210 U/L / ALT: 9 U/L / AST: 36 U/L / GGT: x           acetaminophen   Tablet .. 975 milliGRAM(s) Oral every 6 hours PRN  allopurinol 100 milliGRAM(s) Oral daily  apixaban 5 milliGRAM(s) Oral two times a day  AQUAPHOR (petrolatum Ointment) 1 Application(s) Topical three times a day  benzocaine 15 mG/menthol 3.6 mG (Sugar-Free) Lozenge 1 Lozenge Oral three times a day PRN  bisacodyl Suppository 10 milliGRAM(s) Rectal daily PRN  brimonidine 0.2% Ophthalmic Solution 1 Drop(s) Both EYES three times a day  chlorhexidine 4% Liquid 1 Application(s) Topical daily  epoetin trey Injectable 24142 Unit(s) IV Push <User Schedule>  hydrocortisone 2.5% Lotion 1 Application(s) Topical every 12 hours PRN  lactulose Syrup 10 Gram(s) Oral daily PRN  latanoprost 0.005% Ophthalmic Solution 1 Drop(s) Both EYES daily  lidocaine 2% Gel 1 Application(s) Topical two times a day  metoprolol tartrate 25 milliGRAM(s) Oral every 12 hours  Nephro-candace 1 Tablet(s) Oral daily  nystatin    Suspension 441739 Unit(s) Oral four times a day  oxyCODONE    IR 5 milliGRAM(s) Oral every 4 hours PRN  oxyCODONE    IR 10 milliGRAM(s) Oral every 4 hours PRN  oxyCODONE    IR 5 milliGRAM(s) Oral every 4 hours PRN  oxyCODONE    IR 10 milliGRAM(s) Oral every 4 hours PRN  polyethylene glycol 3350 17 Gram(s) Oral daily  saccharomyces boulardii 250 milliGRAM(s) Oral two times a day  senna 2 Tablet(s) Oral at bedtime  simethicone 80 milliGRAM(s) Chew three times a day PRN    Assessment/Plan:    ESRD on HD  S/p DDRT at [facility] 12/8/19, DGF  S/p transplant kidney bx 12/13: ATN, focal TMA  S/p PLEX 12/13, 12/15; d/c-d 12/20 w/op HD 3 x wk  Re-adm 12/27 to [facility] w/Flu A, infected carlos-nephric hematoma   S/p OR 1/1 for washout and repeat renal graft biopsy (ATN)  Dx w/RLE DVT  S/p OR 1/10 for infected carlos-nephric hematoma, hemorrhage involving pseudo-aneurysm of anastomosis of renal artery to external iliac vein,   s/p transplant nephrectomy, s/p IVCF   S/p PEA arrest 1/13, + PE, R retroperitoneal collection  S/p IR drainage of abd collection  RUE DVT, AC  LUE AVF infiltration 2/1 w/hematoma extending to L chest wall while on Eliquis, s/p PRBCs  LUE wound, vascular following  S/p perm cath  Tx to acute rehab 2/14  S/p daily HD last week for volume overload  Improved, HD TTS via perm cath  TMP as able  Epogen  Bld work w/HD    [phone]

## 2020-02-29 LAB
ALBUMIN SERPL ELPH-MCNC: 2.3 G/DL — LOW (ref 3.3–5)
ANION GAP SERPL CALC-SCNC: 8 MMOL/L — SIGNIFICANT CHANGE UP (ref 5–17)
BUN SERPL-MCNC: 33 MG/DL — HIGH (ref 7–23)
CALCIUM SERPL-MCNC: 8.8 MG/DL — SIGNIFICANT CHANGE UP (ref 8.4–10.5)
CHLORIDE SERPL-SCNC: 99 MMOL/L — SIGNIFICANT CHANGE UP (ref 96–108)
CO2 SERPL-SCNC: 28 MMOL/L — SIGNIFICANT CHANGE UP (ref 22–31)
CREAT SERPL-MCNC: 6.14 MG/DL — HIGH (ref 0.5–1.3)
GLUCOSE SERPL-MCNC: 111 MG/DL — HIGH (ref 70–99)
HCT VFR BLD CALC: 27 % — LOW (ref 39–50)
HGB BLD-MCNC: 8.7 G/DL — LOW (ref 13–17)
MCHC RBC-ENTMCNC: 29.1 PG — SIGNIFICANT CHANGE UP (ref 27–34)
MCHC RBC-ENTMCNC: 32.2 GM/DL — SIGNIFICANT CHANGE UP (ref 32–36)
MCV RBC AUTO: 90.3 FL — SIGNIFICANT CHANGE UP (ref 80–100)
NRBC # BLD: 0 /100 WBCS — SIGNIFICANT CHANGE UP (ref 0–0)
PHOSPHATE SERPL-MCNC: 4.7 MG/DL — HIGH (ref 2.5–4.5)
PLATELET # BLD AUTO: 464 K/UL — HIGH (ref 150–400)
POTASSIUM SERPL-MCNC: 3.9 MMOL/L — SIGNIFICANT CHANGE UP (ref 3.5–5.3)
POTASSIUM SERPL-SCNC: 3.9 MMOL/L — SIGNIFICANT CHANGE UP (ref 3.5–5.3)
RBC # BLD: 2.99 M/UL — LOW (ref 4.2–5.8)
RBC # FLD: 16.4 % — HIGH (ref 10.3–14.5)
SODIUM SERPL-SCNC: 135 MMOL/L — SIGNIFICANT CHANGE UP (ref 135–145)
WBC # BLD: 13.67 K/UL — HIGH (ref 3.8–10.5)
WBC # FLD AUTO: 13.67 K/UL — HIGH (ref 3.8–10.5)

## 2020-02-29 PROCEDURE — 99223 1ST HOSP IP/OBS HIGH 75: CPT

## 2020-02-29 PROCEDURE — 99232 SBSQ HOSP IP/OBS MODERATE 35: CPT

## 2020-02-29 RX ADMIN — LIDOCAINE 1 APPLICATION(S): 4 CREAM TOPICAL at 18:41

## 2020-02-29 RX ADMIN — Medication 1 APPLICATION(S): at 05:32

## 2020-02-29 RX ADMIN — BRIMONIDINE TARTRATE 1 DROP(S): 2 SOLUTION/ DROPS OPHTHALMIC at 05:34

## 2020-02-29 RX ADMIN — BRIMONIDINE TARTRATE 1 DROP(S): 2 SOLUTION/ DROPS OPHTHALMIC at 22:19

## 2020-02-29 RX ADMIN — Medication 500000 UNIT(S): at 12:26

## 2020-02-29 RX ADMIN — Medication 500000 UNIT(S): at 18:41

## 2020-02-29 RX ADMIN — Medication 500000 UNIT(S): at 22:19

## 2020-02-29 RX ADMIN — Medication 250 MILLIGRAM(S): at 05:34

## 2020-02-29 RX ADMIN — ERYTHROPOIETIN 10000 UNIT(S): 10000 INJECTION, SOLUTION INTRAVENOUS; SUBCUTANEOUS at 16:03

## 2020-02-29 RX ADMIN — SENNA PLUS 2 TABLET(S): 8.6 TABLET ORAL at 22:17

## 2020-02-29 RX ADMIN — Medication 1 TABLET(S): at 12:25

## 2020-02-29 RX ADMIN — Medication 500000 UNIT(S): at 05:34

## 2020-02-29 RX ADMIN — OXYCODONE HYDROCHLORIDE 10 MILLIGRAM(S): 5 TABLET ORAL at 10:08

## 2020-02-29 RX ADMIN — Medication 1 APPLICATION(S): at 15:29

## 2020-02-29 RX ADMIN — Medication 25 MILLIGRAM(S): at 05:34

## 2020-02-29 RX ADMIN — OXYCODONE HYDROCHLORIDE 10 MILLIGRAM(S): 5 TABLET ORAL at 09:08

## 2020-02-29 RX ADMIN — CHLORHEXIDINE GLUCONATE 1 APPLICATION(S): 213 SOLUTION TOPICAL at 12:26

## 2020-02-29 RX ADMIN — LATANOPROST 1 DROP(S): 0.05 SOLUTION/ DROPS OPHTHALMIC; TOPICAL at 12:26

## 2020-02-29 RX ADMIN — APIXABAN 5 MILLIGRAM(S): 2.5 TABLET, FILM COATED ORAL at 18:40

## 2020-02-29 RX ADMIN — APIXABAN 5 MILLIGRAM(S): 2.5 TABLET, FILM COATED ORAL at 05:34

## 2020-02-29 RX ADMIN — BRIMONIDINE TARTRATE 1 DROP(S): 2 SOLUTION/ DROPS OPHTHALMIC at 18:40

## 2020-02-29 RX ADMIN — LIDOCAINE 1 APPLICATION(S): 4 CREAM TOPICAL at 05:34

## 2020-02-29 RX ADMIN — Medication 1 APPLICATION(S): at 22:17

## 2020-02-29 RX ADMIN — Medication 25 MILLIGRAM(S): at 18:40

## 2020-02-29 RX ADMIN — Medication 100 MILLIGRAM(S): at 12:26

## 2020-02-29 NOTE — PROGRESS NOTE ADULT - SUBJECTIVE AND OBJECTIVE BOX
[facility] NEPHROLOGY SERVICES, [facility]  NEPHROLOGY AND HYPERTENSION  [address]  [address]  [place] [address]  [phone]    MD GIANA GUERRERO, MD BEAN ARAUJO, MD JANAE VICTORIA, MD BARBARA MONET, MD DEANN GORDON, MD SALUD GENTILE, MD CATRINA WOO, MD    Stable on HD    Vital Signs Last 24 Hrs  T(C): 36.4 (02-29-20 @ 08:56), Max: 37.2 (02-28-20 @ 22:30)  T(F): 97.6 (02-29-20 @ 08:56), Max: 99 (02-28-20 @ 22:30)  HR: 58 (02-29-20 @ 08:56) (58 - 67)  BP: 130/77 (02-29-20 @ 08:56) (130/77 - 148/75)  RR: 14 (02-29-20 @ 08:56) (14 - 14)  SpO2: 100% (02-29-20 @ 08:56) (98% - 100%)    Respiratory: decreased BS at bases  Cardiovascular: S1 S2  Gastrointestinal: soft NT ND +BS  Extremities: edema much improved                                8.7    13.67 )-----------( 464      ( 29 Feb 2020 14:20 )             27.0     29 Feb 2020 14:20    135    |  99     |  33     ----------------------------<  111    3.9     |  28     |  6.14     Ca    8.8        29 Feb 2020 14:20  Phos  4.7       29 Feb 2020 14:20    TPro  x      /  Alb  2.3    /  TBili  x      /  DBili  x      /  AST  x      /  ALT  x      /  AlkPhos  x      29 Feb 2020 14:20    LIVER FUNCTIONS - ( 29 Feb 2020 14:20 )  Alb: 2.3 g/dL / Pro: x     / ALK PHOS: x     / ALT: x     / AST: x     / GGT: x           acetaminophen   Tablet .. 975 milliGRAM(s) Oral every 6 hours PRN  allopurinol 100 milliGRAM(s) Oral daily  apixaban 5 milliGRAM(s) Oral two times a day  AQUAPHOR (petrolatum Ointment) 1 Application(s) Topical three times a day  benzocaine 15 mG/menthol 3.6 mG (Sugar-Free) Lozenge 1 Lozenge Oral three times a day PRN  bisacodyl Suppository 10 milliGRAM(s) Rectal daily PRN  brimonidine 0.2% Ophthalmic Solution 1 Drop(s) Both EYES three times a day  chlorhexidine 4% Liquid 1 Application(s) Topical daily  epoetin trey Injectable 96935 Unit(s) IV Push <User Schedule>  hydrocortisone 2.5% Lotion 1 Application(s) Topical every 12 hours PRN  lactulose Syrup 10 Gram(s) Oral daily PRN  latanoprost 0.005% Ophthalmic Solution 1 Drop(s) Both EYES daily  lidocaine 2% Gel 1 Application(s) Topical two times a day  metoprolol tartrate 25 milliGRAM(s) Oral every 12 hours  Nephro-candace 1 Tablet(s) Oral daily  nystatin    Suspension 909443 Unit(s) Oral four times a day  oxyCODONE    IR 5 milliGRAM(s) Oral every 4 hours PRN  oxyCODONE    IR 10 milliGRAM(s) Oral every 4 hours PRN  oxyCODONE    IR 5 milliGRAM(s) Oral every 4 hours PRN  oxyCODONE    IR 10 milliGRAM(s) Oral every 4 hours PRN  polyethylene glycol 3350 17 Gram(s) Oral daily  saccharomyces boulardii 250 milliGRAM(s) Oral two times a day  senna 2 Tablet(s) Oral at bedtime  simethicone 80 milliGRAM(s) Chew three times a day PRN    Assessment/Plan:    ESRD on HD  S/p DDRT at [facility] 12/8/19, DGF  S/p transplant kidney bx 12/13: ATN, focal TMA  S/p PLEX 12/13, 12/15; d/c-d 12/20 w/op HD 3 x wk  Re-adm 12/27 to [facility] w/Flu A, infected carlos-nephric hematoma   S/p OR 1/1 for washout and repeat renal graft biopsy (ATN)  Dx w/RLE DVT  S/p OR 1/10 for infected carlos-nephric hematoma, hemorrhage involving pseudo-aneurysm of anastomosis of renal artery to external iliac vein,   s/p transplant nephrectomy, s/p IVCF   S/p PEA arrest 1/13, + PE, R retroperitoneal collection  S/p IR drainage of abd collection  RUE DVT, AC  LUE AVF infiltration 2/1 w/hematoma extending to L chest wall while on Eliquis, s/p PRBCs  LUE wound, vascular following  S/p perm cath  Tx to acute rehab 2/14  HD TTS via perm cath  TMP as able  Epogen 3 x week w/HD  Bld work w/each HD    [phone]

## 2020-02-29 NOTE — PROGRESS NOTE ADULT - SUBJECTIVE AND OBJECTIVE BOX
No overnight events.  Feels well and is in a good mood today.    REVIEW OF SYSTEMS  Constitutional - No fever,  +fatigue  Neurological - No headaches, No loss of strength  Musculoskeletal - No joint pain, No joint swelling, No muscle pain    VITALS  T(C): 36.4 (02-29-20 @ 08:56), Max: 37.2 (02-28-20 @ 22:30)  HR: 58 (02-29-20 @ 08:56) (58 - 67)  BP: 130/77 (02-29-20 @ 08:56) (130/77 - 148/75)  RR: 14 (02-29-20 @ 08:56) (14 - 14)  SpO2: 100% (02-29-20 @ 08:56) (98% - 100%)  Wt(kg): --       MEDICATIONS   acetaminophen   Tablet .. 975 milliGRAM(s) every 6 hours PRN  allopurinol 100 milliGRAM(s) daily  apixaban 5 milliGRAM(s) two times a day  AQUAPHOR (petrolatum Ointment) 1 Application(s) three times a day  benzocaine 15 mG/menthol 3.6 mG (Sugar-Free) Lozenge 1 Lozenge three times a day PRN  bisacodyl Suppository 10 milliGRAM(s) daily PRN  brimonidine 0.2% Ophthalmic Solution 1 Drop(s) three times a day  chlorhexidine 4% Liquid 1 Application(s) daily  epoetin trey Injectable 87437 Unit(s) <User Schedule>  hydrocortisone 2.5% Lotion 1 Application(s) every 12 hours PRN  lactulose Syrup 10 Gram(s) daily PRN  latanoprost 0.005% Ophthalmic Solution 1 Drop(s) daily  lidocaine 2% Gel 1 Application(s) two times a day  metoprolol tartrate 25 milliGRAM(s) every 12 hours  Nephro-candace 1 Tablet(s) daily  nystatin    Suspension 073009 Unit(s) four times a day  oxyCODONE    IR 5 milliGRAM(s) every 4 hours PRN  oxyCODONE    IR 10 milliGRAM(s) every 4 hours PRN  oxyCODONE    IR 5 milliGRAM(s) every 4 hours PRN  oxyCODONE    IR 10 milliGRAM(s) every 4 hours PRN  polyethylene glycol 3350 17 Gram(s) daily  saccharomyces boulardii 250 milliGRAM(s) two times a day  senna 2 Tablet(s) at bedtime  simethicone 80 milliGRAM(s) three times a day PRN      RECENT LABS/IMAGING                        8.7    10.67 )-----------( 431      ( 27 Feb 2020 15:00 )             26.0     02-27    137  |  97  |  35<H>  ----------------------------<  93  3.7   |  29  |  6.48<H>    Ca    8.5      27 Feb 2020 15:00  Phos  4.6     02-27    TPro  7.3  /  Alb  2.3<L>  /  TBili  1.5<H>  /  DBili  x   /  AST  36  /  ALT  9<L>  /  AlkPhos  210<H>  02-27                  ---------  PHYSICAL EXAM  Constitutional - NAD, Comfortable  Pulm - Breathing comfortably, No wheezing  Abd - Soft, NTND  Extremities - No edema, No calf tenderness  Neurologic Exam -                    Cognitive - Awake, Alert	     Communication - Fluent   Psychiatric - Mood WNL, Affect WNL    ASSESSMENT/PLAN  49y Male with functional deficits after debility from complicated hospital course/complications  Pain - Tylenol PRN, Oxycodone  GI PPX - Dulcolax, Lactulose, Simethicone, Miralax  ESRD - HD, Epoetin   DVT - Eliquis    Continue 3hrs a day of comprehensive rehab program.

## 2020-03-01 LAB
HBV CORE IGM SER-ACNC: SIGNIFICANT CHANGE UP
HBV SURFACE AB SER-ACNC: REACTIVE
HBV SURFACE AG SER-ACNC: SIGNIFICANT CHANGE UP
HCV AB S/CO SERPL IA: 0.2 S/CO — SIGNIFICANT CHANGE UP (ref 0–0.99)
HCV AB SERPL-IMP: SIGNIFICANT CHANGE UP

## 2020-03-01 PROCEDURE — 99232 SBSQ HOSP IP/OBS MODERATE 35: CPT

## 2020-03-01 PROCEDURE — 93010 ELECTROCARDIOGRAM REPORT: CPT

## 2020-03-01 RX ADMIN — SENNA PLUS 2 TABLET(S): 8.6 TABLET ORAL at 22:10

## 2020-03-01 RX ADMIN — BRIMONIDINE TARTRATE 1 DROP(S): 2 SOLUTION/ DROPS OPHTHALMIC at 06:12

## 2020-03-01 RX ADMIN — APIXABAN 5 MILLIGRAM(S): 2.5 TABLET, FILM COATED ORAL at 17:49

## 2020-03-01 RX ADMIN — Medication 1 APPLICATION(S): at 06:08

## 2020-03-01 RX ADMIN — LIDOCAINE 1 APPLICATION(S): 4 CREAM TOPICAL at 06:12

## 2020-03-01 RX ADMIN — OXYCODONE HYDROCHLORIDE 10 MILLIGRAM(S): 5 TABLET ORAL at 06:09

## 2020-03-01 RX ADMIN — Medication 250 MILLIGRAM(S): at 06:09

## 2020-03-01 RX ADMIN — Medication 100 MILLIGRAM(S): at 12:43

## 2020-03-01 RX ADMIN — OXYCODONE HYDROCHLORIDE 10 MILLIGRAM(S): 5 TABLET ORAL at 07:20

## 2020-03-01 RX ADMIN — Medication 1 TABLET(S): at 12:44

## 2020-03-01 RX ADMIN — LATANOPROST 1 DROP(S): 0.05 SOLUTION/ DROPS OPHTHALMIC; TOPICAL at 12:43

## 2020-03-01 RX ADMIN — TUBERCULIN PURIFIED PROTEIN DERIVATIVE 5 UNIT(S): 5 INJECTION, SOLUTION INTRADERMAL at 14:19

## 2020-03-01 RX ADMIN — BRIMONIDINE TARTRATE 1 DROP(S): 2 SOLUTION/ DROPS OPHTHALMIC at 17:49

## 2020-03-01 RX ADMIN — LIDOCAINE 1 APPLICATION(S): 4 CREAM TOPICAL at 22:11

## 2020-03-01 RX ADMIN — Medication 500000 UNIT(S): at 06:11

## 2020-03-01 RX ADMIN — Medication 1 APPLICATION(S): at 15:02

## 2020-03-01 RX ADMIN — Medication 25 MILLIGRAM(S): at 06:08

## 2020-03-01 RX ADMIN — Medication 25 MILLIGRAM(S): at 17:50

## 2020-03-01 RX ADMIN — BRIMONIDINE TARTRATE 1 DROP(S): 2 SOLUTION/ DROPS OPHTHALMIC at 22:10

## 2020-03-01 RX ADMIN — APIXABAN 5 MILLIGRAM(S): 2.5 TABLET, FILM COATED ORAL at 06:08

## 2020-03-01 RX ADMIN — Medication 1 APPLICATION(S): at 22:09

## 2020-03-01 RX ADMIN — Medication 500000 UNIT(S): at 12:44

## 2020-03-01 RX ADMIN — OXYCODONE HYDROCHLORIDE 5 MILLIGRAM(S): 5 TABLET ORAL at 15:25

## 2020-03-01 RX ADMIN — CHLORHEXIDINE GLUCONATE 1 APPLICATION(S): 213 SOLUTION TOPICAL at 12:45

## 2020-03-01 RX ADMIN — Medication 500000 UNIT(S): at 17:49

## 2020-03-01 RX ADMIN — OXYCODONE HYDROCHLORIDE 5 MILLIGRAM(S): 5 TABLET ORAL at 14:25

## 2020-03-01 NOTE — PROGRESS NOTE ADULT - SUBJECTIVE AND OBJECTIVE BOX
No overnight events.  Feels well but wants to relax today.  Will get OOB and walk later.    REVIEW OF SYSTEMS  Constitutional - No fever,  +fatigue  Neurological - No headaches, No loss of strength  Musculoskeletal - No joint pain, No joint swelling, No muscle pain        VITALS  T(C): 36.7 (02-29-20 @ 22:00), Max: 37 (02-29-20 @ 18:25)  HR: 72 (03-01-20 @ 06:21) (58 - 72)  BP: 130/76 (03-01-20 @ 06:21) (123/78 - 166/83)  RR: 14 (03-01-20 @ 06:21) (12 - 14)  SpO2: 90% (03-01-20 @ 06:21) (90% - 100%)  Wt(kg): --        MEDICATIONS   acetaminophen   Tablet .. 975 milliGRAM(s) every 6 hours PRN  allopurinol 100 milliGRAM(s) daily  apixaban 5 milliGRAM(s) two times a day  AQUAPHOR (petrolatum Ointment) 1 Application(s) three times a day  benzocaine 15 mG/menthol 3.6 mG (Sugar-Free) Lozenge 1 Lozenge three times a day PRN  bisacodyl Suppository 10 milliGRAM(s) daily PRN  brimonidine 0.2% Ophthalmic Solution 1 Drop(s) three times a day  chlorhexidine 4% Liquid 1 Application(s) daily  epoetin trey Injectable 21728 Unit(s) <User Schedule>  hydrocortisone 2.5% Lotion 1 Application(s) every 12 hours PRN  lactulose Syrup 10 Gram(s) daily PRN  latanoprost 0.005% Ophthalmic Solution 1 Drop(s) daily  lidocaine 2% Gel 1 Application(s) two times a day  metoprolol tartrate 25 milliGRAM(s) every 12 hours  Nephro-candace 1 Tablet(s) daily  nystatin    Suspension 586048 Unit(s) four times a day  oxyCODONE    IR 5 milliGRAM(s) every 4 hours PRN  oxyCODONE    IR 10 milliGRAM(s) every 4 hours PRN  oxyCODONE    IR 5 milliGRAM(s) every 4 hours PRN  oxyCODONE    IR 10 milliGRAM(s) every 4 hours PRN  polyethylene glycol 3350 17 Gram(s) daily  saccharomyces boulardii 250 milliGRAM(s) two times a day  senna 2 Tablet(s) at bedtime  simethicone 80 milliGRAM(s) three times a day PRN      RECENT LABS/IMAGING                        8.7    13.67 )-----------( 464      ( 29 Feb 2020 14:20 )             27.0     02-29    135  |  99  |  33<H>  ----------------------------<  111<H>  3.9   |  28  |  6.14<H>    Ca    8.8      29 Feb 2020 14:20  Phos  4.7     02-29    TPro  x   /  Alb  2.3<L>  /  TBili  x   /  DBili  x   /  AST  x   /  ALT  x   /  AlkPhos  x   02-29                  ---------  PHYSICAL EXAM  Constitutional - NAD, Comfortable  Pulm - Breathing comfortably, No wheezing  Abd - Soft, NTND  Extremities - No edema, No calf tenderness  Neurologic Exam -                    Cognitive - Awake, Alert	     Communication - Fluent   Psychiatric - Mood WNL, Affect WNL    ASSESSMENT/PLAN  49y Male with functional deficits after debility from complicated hospital course/complications  Pain - Tylenol PRN, Oxycodone  Leukocytosis - Monitoring, No fevers, Vitals WNL, Clinically stable  GI PPX - Dulcolax, Lactulose, Simethicone, Miralax  ESRD - HD, Epoetin   DVT - Eliquis    Continue 3hrs a day of comprehensive rehab program.

## 2020-03-02 ENCOUNTER — APPOINTMENT (OUTPATIENT)
Dept: RHEUMATOLOGY | Facility: CLINIC | Age: 50
End: 2020-03-02

## 2020-03-02 ENCOUNTER — TRANSCRIPTION ENCOUNTER (OUTPATIENT)
Age: 50
End: 2020-03-02

## 2020-03-02 PROCEDURE — 99232 SBSQ HOSP IP/OBS MODERATE 35: CPT | Mod: GC

## 2020-03-02 PROCEDURE — 93010 ELECTROCARDIOGRAM REPORT: CPT

## 2020-03-02 RX ADMIN — Medication 1 APPLICATION(S): at 21:10

## 2020-03-02 RX ADMIN — CHLORHEXIDINE GLUCONATE 1 APPLICATION(S): 213 SOLUTION TOPICAL at 11:36

## 2020-03-02 RX ADMIN — APIXABAN 5 MILLIGRAM(S): 2.5 TABLET, FILM COATED ORAL at 06:18

## 2020-03-02 RX ADMIN — Medication 1 APPLICATION(S): at 06:16

## 2020-03-02 RX ADMIN — OXYCODONE HYDROCHLORIDE 10 MILLIGRAM(S): 5 TABLET ORAL at 07:36

## 2020-03-02 RX ADMIN — Medication 500000 UNIT(S): at 11:35

## 2020-03-02 RX ADMIN — APIXABAN 5 MILLIGRAM(S): 2.5 TABLET, FILM COATED ORAL at 18:16

## 2020-03-02 RX ADMIN — OXYCODONE HYDROCHLORIDE 10 MILLIGRAM(S): 5 TABLET ORAL at 06:20

## 2020-03-02 RX ADMIN — Medication 250 MILLIGRAM(S): at 06:18

## 2020-03-02 RX ADMIN — LATANOPROST 1 DROP(S): 0.05 SOLUTION/ DROPS OPHTHALMIC; TOPICAL at 11:35

## 2020-03-02 RX ADMIN — Medication 500000 UNIT(S): at 00:41

## 2020-03-02 RX ADMIN — Medication 25 MILLIGRAM(S): at 18:16

## 2020-03-02 RX ADMIN — BRIMONIDINE TARTRATE 1 DROP(S): 2 SOLUTION/ DROPS OPHTHALMIC at 06:18

## 2020-03-02 RX ADMIN — OXYCODONE HYDROCHLORIDE 5 MILLIGRAM(S): 5 TABLET ORAL at 16:12

## 2020-03-02 RX ADMIN — Medication 500000 UNIT(S): at 06:24

## 2020-03-02 RX ADMIN — Medication 1 TABLET(S): at 11:35

## 2020-03-02 RX ADMIN — OXYCODONE HYDROCHLORIDE 5 MILLIGRAM(S): 5 TABLET ORAL at 15:11

## 2020-03-02 RX ADMIN — Medication 500000 UNIT(S): at 19:52

## 2020-03-02 RX ADMIN — Medication 1 APPLICATION(S): at 15:13

## 2020-03-02 RX ADMIN — LIDOCAINE 1 APPLICATION(S): 4 CREAM TOPICAL at 21:11

## 2020-03-02 RX ADMIN — LIDOCAINE 1 APPLICATION(S): 4 CREAM TOPICAL at 06:24

## 2020-03-02 RX ADMIN — Medication 100 MILLIGRAM(S): at 11:39

## 2020-03-02 RX ADMIN — BRIMONIDINE TARTRATE 1 DROP(S): 2 SOLUTION/ DROPS OPHTHALMIC at 21:11

## 2020-03-02 RX ADMIN — Medication 25 MILLIGRAM(S): at 06:18

## 2020-03-02 NOTE — PROGRESS NOTE ADULT - ASSESSMENT
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 Young, followed by Dr. 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 His course was complicated by RP hematoma and bladder perforation s/p repair and evacuation on 1/10, saddle PE s/p IVC filter on 1/10, acute DVT of RUE brachial vein, LUE AVF bleeding left deep brachial artery psuedoaneurysm, and cardiac arrest.     #Debility due to prolonged and complicated hospital course  -continue Comprehensive Rehab Program of PT/OT  -restorative dining added due to difficulty with container opening/hand use  -neuropsychology for support due to anxiety  -continue rehab efforts     #DDRT: c/b DGF/ATN/perinephric infected hematoma/pseudoneurysm/graft nephrectomy   - S/P AVF cannulation for HD c/b infiltration causing extravasation/bleeding to the left upper arm and chest noted on CTA  - S/P IR LUE arteriography showing a tiny pseudoaneurysm arising from a distal branch of the left deep brachial artery s/p dissection of a tiny feeder artery that resulted in resolution of the pseudoaneurysm.  - S/P IR permacath placement with TTS HD  - Eliquis 5mg BID started 2/12 and hold Aspirin    - Unable to continue with ciprofloxacin for abx course due to prolonged Qtc. ID consulted and completed one dose of amikacin 500mg after dialysis on 2/18 in order to complete abx course. Per ID, no need for additional antibiotics.  - pain control: Tylenol, Oxycodone. Neurontin discontinued on 2/15 due to prolonged QTc  - Wound care consult  appreciated 2/14  -Follow up visit by Dr Polo 2/26, Vascular surgery as well: recs to continue local wound care and to not use left arm AVF for 6-8 weeks.   - Thigh high compression stocking/ACE wrap to bilateral LE    - ACE wrap LUE from hand to shoulder  - Nephrology following    #Saddle PE/multiple DVT: RUE brachial DVT,  Right distal brachia vein , acute DVT of Right external iliac, common femoral, femoral veins, Right soleal vein   - S/p IVC filter on 1/9  - Apixaban 5mg BID  -monitor H/H, cardiopulmonary status      #New Afib/Vtach:  -Continue metoprolol  -Patient with prolonged QTC. Seen by hospitalist and cardiology 2/15:  ·	DC cipro and gabapentin  ·	serial ekgs-> 2/23 still prolonged QTC  Cardiology consulted and discontinued amiodarone   -Repeat EKG pending-hospitalist following     #Amiodarone toxicity: rule out per cardiology. LFTs stable. TSH 15  -Follow up CXR-possible fluid overload but might be bc patient was in dyalysis       #Elevated TSH  TSH 15  Total T3 66  Free thyroxine 1.5  T4 8.9   Per hospitalist repeat TSH this week, ?reactive       #HTN:  -Continue metoprolol and hold on HD days per outside hospital    #ESRD: on HD  -Nephrology consult.on T/Th/Sat  -permacath care  -Epogen, nephrovite    #Glaucoma:  -Continue with drops    #Gout:  -Allopurinol    #RA:  -Continue with comprehensive rehab  -Continue with pain control as below  -rheumatology consult, appreciate recs    #Thrombocytosis:  -Monitor labs  -Hematology follow up as an outpatient    #Hyponatremia: Resolved  -Monitor labs    #Anemia: stable   -Continue with epogen  -Monitor labs    #Pain Mgmt   - Tylenol PRN, Oxycodone PRN. DC neurontin given QTc prolongation    #GI/Bowel Mgmt  -  Continent c/w  Senna,  Miralax  - Continue lactulose PRN    #/Bladder Mgmt -   HD, voids small amounts     #FEN   - Diet - Regular + Thins, Renal Restrictions  - DC famotidine due to prolonged Qtc      Dispo:  Plan for discharge 3/3/20 with goals of min to mod assist.

## 2020-03-02 NOTE — PROGRESS NOTE ADULT - SUBJECTIVE AND OBJECTIVE BOX
Patient is a 49y old  Male who presents with a chief complaint of Debility (20 Feb 2020 19:07)    ROS:   No acute events overnight.  Reports the weekend went well.   Pain controlled pain. Denies HA, dizziness, CP, SOB, abdominal pain, constipation. Moving bowels regularly. Still voids small amounts. No dysuria  Feeling well and making progress in therapy.   Son coming today for family training per patient.         Vital Signs Last 24 Hrs  T(C): 36.8 (28 Feb 2020 08:05), Max: 37.2 (27 Feb 2020 14:21)  T(F): 98.2 (28 Feb 2020 08:05), Max: 98.9 (27 Feb 2020 14:21)  HR: 57 (28 Feb 2020 08:05) (57 - 76)  BP: 123/69 (28 Feb 2020 08:05) (101/77 - 154/78)  RR: 14 (28 Feb 2020 08:05) (14 - 16)  SpO2: 100% (28 Feb 2020 08:05) (95% - 100%)          Physical Exam:   · Constitutional	detailed exam	  · Constitutional Comments	alert O x 3.	  · Respiratory	detailed exam	  · Respiratory Details	breathing comfortably on room air, no increased work of breathing. CTA 	  · Respiratory Comments	fair effort, cath site clean chest wall right, no swelling or warmth no TTP	  · Cardiovascular	detailed exam	  · Cardiovascular Details	regular rate and rhythm	  · Cardiovascular Comments	warm, well perfused	  · Gastrointestinal	detailed exam	  · GI Normal	normal; soft; nontender, dressing to right lower quadrant C/D/I 	  · Extremities	detailed exam	  · Extremities Comments	 1+ LE swelling-stable RUE 1+ swelling-stable Dressings to feet c/d/i Dressing to left axilla c/d/i 	    Functional Exam:   CG for transfers   Sit to sat mod A for tub bench, refused to practice slide on tub bench due to pain  CG for commode transfers   Mod A lower body dressing   Ambulating 100ft CG with RW   4 steps with 1 rail min to mod A           Recent labs/tests:     02-29    135  |  99  |  33<H>  ----------------------------<  111<H>  3.9   |  28  |  6.14<H>    Ca    8.8      29 Feb 2020 14:20  Phos  4.7     02-29    TPro  x   /  Alb  2.3<L>  /  TBili  x   /  DBili  x   /  AST  x   /  ALT  x   /  AlkPhos  x   02-29                        8.7    13.67 )-----------( 464      ( 29 Feb 2020 14:20 )             27.0       MEDICATIONS  (STANDING):  allopurinol 100 milliGRAM(s) Oral daily  apixaban 5 milliGRAM(s) Oral two times a day  AQUAPHOR (petrolatum Ointment) 1 Application(s) Topical three times a day  brimonidine 0.2% Ophthalmic Solution 1 Drop(s) Both EYES three times a day  chlorhexidine 4% Liquid 1 Application(s) Topical daily  epoetin trey Injectable 68284 Unit(s) IV Push <User Schedule>  latanoprost 0.005% Ophthalmic Solution 1 Drop(s) Both EYES daily  lidocaine 2% Gel 1 Application(s) Topical two times a day  metoprolol tartrate 25 milliGRAM(s) Oral every 12 hours  Nephro-candace 1 Tablet(s) Oral daily  nystatin    Suspension 443151 Unit(s) Oral four times a day  polyethylene glycol 3350 17 Gram(s) Oral daily  saccharomyces boulardii 250 milliGRAM(s) Oral two times a day  senna 2 Tablet(s) Oral at bedtime    MEDICATIONS  (PRN):  acetaminophen   Tablet .. 975 milliGRAM(s) Oral every 6 hours PRN Mild Pain (1 - 3)  benzocaine 15 mG/menthol 3.6 mG (Sugar-Free) Lozenge 1 Lozenge Oral three times a day PRN Sore Throat  bisacodyl Suppository 10 milliGRAM(s) Rectal daily PRN Constipation  hydrocortisone 2.5% Lotion 1 Application(s) Topical every 12 hours PRN Rash and/or Itching  lactulose Syrup 10 Gram(s) Oral daily PRN constipation  oxyCODONE    IR 5 milliGRAM(s) Oral every 4 hours PRN Moderate Pain (4 - 6)  oxyCODONE    IR 10 milliGRAM(s) Oral every 4 hours PRN Severe Pain (7 - 10)  simethicone 80 milliGRAM(s) Chew three times a day PRN Gas

## 2020-03-02 NOTE — DISCHARGE NOTE PROVIDER - NSDCCPCAREPLAN_GEN_ALL_CORE_FT
PRINCIPAL DISCHARGE DIAGNOSIS  Diagnosis: Debility  Assessment and Plan of Treatment: due to prolonged and complicated hospital course   Continue in home therapies with PT and OT   Follow up with Dr Tyler in about 6 weeks      SECONDARY DISCHARGE DIAGNOSES  Diagnosis: DVT (deep venous thrombosis)  Assessment and Plan of Treatment: Continue eliquis as presacribed    Diagnosis: Afib  Assessment and Plan of Treatment: Continue metoprolol as prescribed    Diagnosis: Amiodarone toxicity  Assessment and Plan of Treatment: Amiodarone was disconitnued due to prolonge QT, follow up with PCP and cardiology as outpatient    Diagnosis: Elevated TSH  Assessment and Plan of Treatment: See PCP and Nephrology this week, follow up TSH this week, TSH of 15 last week (?rective)    Diagnosis: Hypertension  Assessment and Plan of Treatment: continue blood pressure medications as prescribed    Diagnosis: Gout  Assessment and Plan of Treatment: continue allopurinol    Diagnosis: Thrombocytosis  Assessment and Plan of Treatment: follow up with hematology as outpatient    Diagnosis: Pulmonary embolism  Assessment and Plan of Treatment: continue eliquis as prescribed    Diagnosis: End stage renal disease  Assessment and Plan of Treatment: Follow up with nephorlogy this week   Continue HD on Mon, Wed, Fridays

## 2020-03-02 NOTE — PROGRESS NOTE ADULT - SUBJECTIVE AND OBJECTIVE BOX
[facility] NEPHROLOGY SERVICES, [facility]  NEPHROLOGY AND HYPERTENSION  [address]  [address]  [place] [address]  [phone]    MD GIANA GUERRERO, MD JANAE MCDONNELL, MD BARBARA MONET, MD SALUD GARZA, MD CATRINA WOO MD    No complaints    Vital Signs Last 24 Hrs  T(C): 36.4 (03-02-20 @ 08:22), Max: 37.2 (03-01-20 @ 21:00)  T(F): 97.6 (03-02-20 @ 08:22), Max: 99 (03-01-20 @ 21:00)  HR: 65 (03-02-20 @ 18:19) (60 - 66)  BP: 146/78 (03-02-20 @ 18:19) (115/69 - 146/78)  RR: 14 (03-02-20 @ 08:22) (14 - 14)  SpO2: 100% (03-02-20 @ 08:22) (97% - 100%)    Respiratory: decreased BS at bases  Cardiovascular: S1 S2  Gastrointestinal: soft NT ND +BS  Extremities: edema much improved                     acetaminophen   Tablet .. 975 milliGRAM(s) Oral every 6 hours PRN  allopurinol 100 milliGRAM(s) Oral daily  apixaban 5 milliGRAM(s) Oral two times a day  AQUAPHOR (petrolatum Ointment) 1 Application(s) Topical three times a day  benzocaine 15 mG/menthol 3.6 mG (Sugar-Free) Lozenge 1 Lozenge Oral three times a day PRN  bisacodyl Suppository 10 milliGRAM(s) Rectal daily PRN  brimonidine 0.2% Ophthalmic Solution 1 Drop(s) Both EYES three times a day  chlorhexidine 4% Liquid 1 Application(s) Topical daily  epoetin trey Injectable 23461 Unit(s) IV Push <User Schedule>  hydrocortisone 2.5% Lotion 1 Application(s) Topical every 12 hours PRN  lactulose Syrup 10 Gram(s) Oral daily PRN  latanoprost 0.005% Ophthalmic Solution 1 Drop(s) Both EYES daily  lidocaine 2% Gel 1 Application(s) Topical two times a day  metoprolol tartrate 25 milliGRAM(s) Oral every 12 hours  Nephro-candace 1 Tablet(s) Oral daily  nystatin    Suspension 506398 Unit(s) Oral four times a day  oxyCODONE    IR 5 milliGRAM(s) Oral every 4 hours PRN  oxyCODONE    IR 10 milliGRAM(s) Oral every 4 hours PRN  polyethylene glycol 3350 17 Gram(s) Oral daily  saccharomyces boulardii 250 milliGRAM(s) Oral two times a day  senna 2 Tablet(s) Oral at bedtime  simethicone 80 milliGRAM(s) Chew three times a day PRN    Assessment/Plan:    ESRD on HD  S/p DDRT at [facility] 12/8/19, DGF  S/p transplant kidney bx 12/13: ATN, focal TMA  S/p PLEX 12/13, 12/15; d/c-d 12/20 w/op HD 3 x wk  Re-adm 12/27 to [facility] w/Flu A, infected carlos-nephric hematoma   S/p OR 1/1 for washout and repeat renal graft biopsy (ATN)  Dx w/RLE DVT  S/p OR 1/10 for infected carlos-nephric hematoma, hemorrhage involving pseudo-aneurysm of anastomosis of renal artery to external iliac vein,   s/p transplant nephrectomy, s/p IVCF   S/p PEA arrest 1/13, + PE, R retroperitoneal collection  S/p IR drainage of abd collection  RUE DVT, AC  LUE AVF infiltration 2/1 w/hematoma extending to L chest wall while on Eliquis, s/p PRBCs  LUE wound, vascular following  S/p perm cath  Tx to acute rehab 2/14  HD TTS via perm cath  TMP as able  Epogen 3 x week w/HD  Bld work w/each HD  D/c planning for tomorrow after HD    [phone]

## 2020-03-02 NOTE — DISCHARGE NOTE PROVIDER - NSDCHHNEEDSERVICE_GEN_ALL_CORE
Medication teaching and assessment/Observation and assessment/Central venous access care/Teaching and training/Rehabilitation services/Wound care and assessment

## 2020-03-02 NOTE — DISCHARGE NOTE PROVIDER - PROVIDER TOKENS
PROVIDER:[TOKEN:[[id number]]],PROVIDER:[TOKEN:[[id number]]],PROVIDER:[TOKEN:[[id number]],FOLLOWUP:[Routine]]

## 2020-03-02 NOTE — DISCHARGE NOTE PROVIDER - CARE PROVIDERS DIRECT ADDRESSES
[email or web address]

## 2020-03-02 NOTE — DISCHARGE NOTE PROVIDER - NSDCFUADDAPPT_GEN_ALL_CORE_FT
Follow up with Transplant Clinic this week, office number [phone]  Fo Follow up with Transplant Clinic this week, office number [phone]  Follow up with primary care MD for referral to wound care clinic for left axilla wound care. Or follow up with Dr Polo, office number [phone], address [address] [place] [address] [address], [place] [address].

## 2020-03-02 NOTE — DISCHARGE NOTE PROVIDER - INSTRUCTIONS
Renal diet, no concentrated phosphorus or potassium  Low sodium   800 milliliter fluid restriction per every 24 hours

## 2020-03-02 NOTE — DISCHARGE NOTE PROVIDER - NSDCMRMEDTOKEN_GEN_ALL_CORE_FT
amiodarone 200 mg oral tablet: 1 tab(s) orally once a day  apixaban 5 mg oral tablet: 1 tab(s) orally 2 times a day  ciprofloxacin 500 mg oral tablet: 1 tab(s) orally once a day  oxyCODONE 5 mg oral tablet: 1 tab(s) orally every 4 hours, As needed, Moderate Pain (4 - 6)  polyethylene glycol 3350 oral powder for reconstitution: 17 gram(s) orally once a day  senna oral tablet: 2 tab(s) orally once a day (at bedtime) acetaminophen 325 mg oral tablet: 3 tab(s) orally every 6 hours, As needed, Mild Pain (1 - 3)  allopurinol 100 mg oral tablet: 1 tab(s) orally once a day  apixaban 5 mg oral tablet: 1 tab(s) orally 2 times a day  brimonidine 0.2% ophthalmic solution: 1 drop(s) to both eyes 3 times a day  latanoprost 0.005% ophthalmic solution: 1 drop(s) to both eyes once a day  lidocaine 2% topical gel with applicator: 1 application topically 2 times a day for both feet (do not apply over wounds/open skin)  metoprolol tartrate 25 mg oral tablet: 1 tab(s) orally every 12 hours  nystatin 100,000 units/mL oral suspension: 5 milliliter(s) orally 4 times a day  oxyCODONE 5 mg oral tablet: 2 tab(s) orally every 4 hours, As Needed -for pain MDD:12 tablets  polyethylene glycol 3350 oral powder for reconstitution: 17 gram(s) orally once a day  senna oral tablet: 2 tab(s) orally once a day (at bedtime)

## 2020-03-02 NOTE — DISCHARGE NOTE PROVIDER - NSDCACTIVITY_GEN_ALL_CORE
Showering allowed/Do not make important decisions/Do not drive or operate machinery/Walking - Indoors allowed/No heavy lifting/straining

## 2020-03-02 NOTE — DISCHARGE NOTE PROVIDER - CARE PROVIDER_API CALL
Chuy Lowery (MD)  Internal Medicine; Nephrology  [address] [place] [address]  Phone: [phone]  Fax: [phone]  Follow Up Time:     Kolton Puckett (DO)  Nephrology  [address] [place] [address]  Phone: [phone]  Fax: [phone]  Follow Up Time:     Jane Tyler (DO)  [facility]  [address] [place] [address], [address] [place] [address]  Phone: [phone]  Fax: [phone]  Follow Up Time: Routine

## 2020-03-02 NOTE — DISCHARGE NOTE PROVIDER - NSDCFUADDINST_GEN_ALL_CORE_FT
Wet to dry dressing three times a day to left axilla wound.   Keep right lower abdomen/right groin incision clean and dry. Cover with sterile dry dressing.   Continue daily care to bilateral food wounds, cleanse daily with normal saline, pat dry with gauze, apply cavilon to periwound, xeroform over wounds and cover with foam dressing (if foam dressing unavailable, use dry sterile dressing).

## 2020-03-02 NOTE — DISCHARGE NOTE PROVIDER - HOSPITAL COURSE
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 Young, followed by Dr. Raphael, [facility] Oncologist), RA with hand contractures who presented to [facility] on 12/27/19 from dialysis for a fever. 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 His post op course c/b DGF requiring HD, thrombocytopenia, elevated LDH and Haptoglobulin. Schistocytes  on peripheral smear concerning for TMA. Envarsus held. Received Belatacept 12/13. Started on PLEX (12/12, 12/15). Underwent renal bx on 12/13 c/w profound ATN.  Found to have much improvement to levels, likely related to Tacrolimus. He was discharged home on 12/20/19 with outpatient HD.         Patient was now sent to [facility] ED on 12/17/19 by dialysis center after he was found to be febrile to 102, he did not get HD this AM, and last full HD session was on 12/26. Upon arrival, pt was found to have the flu and positive urine culture with Enterobacter. ID was consulted and pt was started on Tamiflu and Meropenam. Transplant team and Nephrology was following patient for HD. Pt was taken to the OR on 1/1 for evacuation of perinephretic hematoma which was infected with enterobacter and biopsy of transplanted kidney. Biopsy revealed ATN. Pt was recommended to continue on Meropenam as per ID. Pt also had a CT guided pelvic fluid aspiration by IR on 1/6. Post procedure pt had significant hematuria with clots for which urology was consulted. Pt placed on CBI and hematuria resolved. Pt had multiple antibiotics and antifungals added as per ID recommendations to due to rising WBC. Pt was taken back to the OR on 1/10 for evacuation of RP hematoma removal of external iliac artery stent, bovine pericardial patch repair of ext iliac artery, and ligation of external iliac vein. Pt was emergently taken to the OR after IR for explantation of transplanted kidney and repair of bladder perforation by transplant surgery. Vascular surgery called intra-op and repaired the right external iliac artery was repaired with a bovine pericardial patch, repaired the right external iliac vein, performed a thrombectomy of the right lower extremity veins and had an IVC filter placed. He had a renal US and IR guided drain and pigtail placed on 1/15. He had CT chest/abdomen/pelvis done on 1/21, which demonstrated persistent collection and so the IR pigtail drain was repositioned on 1/22.         His course was notable on 1/8 pt was found to have RLE DVT involving ext iliac, common fem veins, s/p thromobectomy and IVC filter placement, currently on treatment. Hospital course was further complicated by cardiac arrest on 1/13 due to massive acute saddle pulmonary embolism with acute cor pulmonale and acute DVT of the brachial vein in RUE. Pt was started on Eliquis. His course was complicated on 2/1 with AVF infiltrated during HD with aterial extravasation/bleeding to the left upper arm and chest seen on CTa. On 2/3, he had an arteriography, which demonstrated a tiny pseudoaneurysm arising from a distal branch of the left deep brachial artery and so permacath was placed on 2/11. He was also noted to have newly diagnosed afib and cardiology was following for medication management. He was also noted to have thrombocytosis and hematalogy-oncology work up was done with justin for outpatient follow up. He was also noted to have hyponatremia, which remained stable.        Patient medically stabilized and transferred to [facility] 2/14/20 for IRF services.        While in rehab, patient made functional progress in therapies.     He was followed by nephrology and cardiology.     Cardiology found patient with prolonged QT and elevated TSH at 15. Amiodarone was discontinued. Thyroid panel ordered, however hospitalist wanted to repeat another TSH in about a week, ?reactive elevation in TSH. Serial EKGs showed some improvement in QTc length. Patient will need follow up labs with PCP or nephrologist to evalaute a repeat TSH.     Also of note, the area under the left axilla, swollen due to the hematoma, that area that had been a "scabbed" like area opened into a full thickness wound. Local wound care orders where changed to packing the wound with wet to dry dressings TID and plastic surgery was called to re-evaluate. Vascular surgery was also called due to oozing of blood from the site. Both disciplines agreed oozing blood was old blood from the hematoma and not new bleeding. Vascular also documented the left arm AVF should not be accessed for 6-8 weeks.         Patient made funciaontl gains and is ready for discharge to home with family and home care on 3/3/2020.
IV discontinued, cath removed intact

## 2020-03-03 ENCOUNTER — TRANSCRIPTION ENCOUNTER (OUTPATIENT)
Age: 50
End: 2020-03-03

## 2020-03-03 VITALS
HEART RATE: 63 BPM | OXYGEN SATURATION: 99 % | SYSTOLIC BLOOD PRESSURE: 151 MMHG | WEIGHT: 151.9 LBS | DIASTOLIC BLOOD PRESSURE: 62 MMHG | TEMPERATURE: 99 F | RESPIRATION RATE: 12 BRPM

## 2020-03-03 LAB
ALBUMIN SERPL ELPH-MCNC: 2.4 G/DL — LOW (ref 3.3–5)
ANION GAP SERPL CALC-SCNC: 10 MMOL/L — SIGNIFICANT CHANGE UP (ref 5–17)
BUN SERPL-MCNC: 46 MG/DL — HIGH (ref 7–23)
CALCIUM SERPL-MCNC: 8.9 MG/DL — SIGNIFICANT CHANGE UP (ref 8.4–10.5)
CHLORIDE SERPL-SCNC: 100 MMOL/L — SIGNIFICANT CHANGE UP (ref 96–108)
CO2 SERPL-SCNC: 27 MMOL/L — SIGNIFICANT CHANGE UP (ref 22–31)
CREAT SERPL-MCNC: 7.68 MG/DL — HIGH (ref 0.5–1.3)
GLUCOSE SERPL-MCNC: 97 MG/DL — SIGNIFICANT CHANGE UP (ref 70–99)
HCT VFR BLD CALC: 26.6 % — LOW (ref 39–50)
HGB BLD-MCNC: 8.8 G/DL — LOW (ref 13–17)
MCHC RBC-ENTMCNC: 29.1 PG — SIGNIFICANT CHANGE UP (ref 27–34)
MCHC RBC-ENTMCNC: 33.1 GM/DL — SIGNIFICANT CHANGE UP (ref 32–36)
MCV RBC AUTO: 88.1 FL — SIGNIFICANT CHANGE UP (ref 80–100)
NRBC # BLD: 0 /100 WBCS — SIGNIFICANT CHANGE UP (ref 0–0)
PHOSPHATE SERPL-MCNC: 5 MG/DL — HIGH (ref 2.5–4.5)
PLATELET # BLD AUTO: 444 K/UL — HIGH (ref 150–400)
POTASSIUM SERPL-MCNC: 4.1 MMOL/L — SIGNIFICANT CHANGE UP (ref 3.5–5.3)
POTASSIUM SERPL-SCNC: 4.1 MMOL/L — SIGNIFICANT CHANGE UP (ref 3.5–5.3)
RBC # BLD: 3.02 M/UL — LOW (ref 4.2–5.8)
RBC # FLD: 16.4 % — HIGH (ref 10.3–14.5)
SODIUM SERPL-SCNC: 137 MMOL/L — SIGNIFICANT CHANGE UP (ref 135–145)
WBC # BLD: 12.9 K/UL — HIGH (ref 3.8–10.5)
WBC # FLD AUTO: 12.9 K/UL — HIGH (ref 3.8–10.5)

## 2020-03-03 PROCEDURE — 97535 SELF CARE MNGMENT TRAINING: CPT

## 2020-03-03 PROCEDURE — 80069 RENAL FUNCTION PANEL: CPT

## 2020-03-03 PROCEDURE — 86803 HEPATITIS C AB TEST: CPT

## 2020-03-03 PROCEDURE — 99261: CPT

## 2020-03-03 PROCEDURE — 84439 ASSAY OF FREE THYROXINE: CPT

## 2020-03-03 PROCEDURE — 71045 X-RAY EXAM CHEST 1 VIEW: CPT

## 2020-03-03 PROCEDURE — 86704 HEP B CORE ANTIBODY TOTAL: CPT

## 2020-03-03 PROCEDURE — 36415 COLL VENOUS BLD VENIPUNCTURE: CPT

## 2020-03-03 PROCEDURE — 97167 OT EVAL HIGH COMPLEX 60 MIN: CPT

## 2020-03-03 PROCEDURE — 97163 PT EVAL HIGH COMPLEX 45 MIN: CPT

## 2020-03-03 PROCEDURE — 86705 HEP B CORE ANTIBODY IGM: CPT

## 2020-03-03 PROCEDURE — 97110 THERAPEUTIC EXERCISES: CPT

## 2020-03-03 PROCEDURE — 97530 THERAPEUTIC ACTIVITIES: CPT

## 2020-03-03 PROCEDURE — 84480 ASSAY TRIIODOTHYRONINE (T3): CPT

## 2020-03-03 PROCEDURE — 97116 GAIT TRAINING THERAPY: CPT

## 2020-03-03 PROCEDURE — 85027 COMPLETE CBC AUTOMATED: CPT

## 2020-03-03 PROCEDURE — 84100 ASSAY OF PHOSPHORUS: CPT

## 2020-03-03 PROCEDURE — 93010 ELECTROCARDIOGRAM REPORT: CPT

## 2020-03-03 PROCEDURE — 93005 ELECTROCARDIOGRAM TRACING: CPT

## 2020-03-03 PROCEDURE — 87340 HEPATITIS B SURFACE AG IA: CPT

## 2020-03-03 PROCEDURE — 86706 HEP B SURFACE ANTIBODY: CPT

## 2020-03-03 PROCEDURE — 84443 ASSAY THYROID STIM HORMONE: CPT

## 2020-03-03 PROCEDURE — 80053 COMPREHEN METABOLIC PANEL: CPT

## 2020-03-03 PROCEDURE — 84436 ASSAY OF TOTAL THYROXINE: CPT

## 2020-03-03 PROCEDURE — 99232 SBSQ HOSP IP/OBS MODERATE 35: CPT | Mod: GC

## 2020-03-03 RX ORDER — NYSTATIN 500MM UNIT
5 POWDER (EA) MISCELLANEOUS
Qty: 600 | Refills: 0
Start: 2020-03-03

## 2020-03-03 RX ORDER — BRIMONIDINE TARTRATE 2 MG/MG
1 SOLUTION/ DROPS OPHTHALMIC
Qty: 1 | Refills: 0
Start: 2020-03-03

## 2020-03-03 RX ORDER — ALLOPURINOL 300 MG
1 TABLET ORAL
Qty: 30 | Refills: 0
Start: 2020-03-03 | End: 2020-04-01

## 2020-03-03 RX ORDER — POLYETHYLENE GLYCOL 3350 17 G/17G
17 POWDER, FOR SOLUTION ORAL
Qty: 0 | Refills: 0 | DISCHARGE
Start: 2020-03-03

## 2020-03-03 RX ORDER — METOPROLOL TARTRATE 50 MG
2 TABLET ORAL
Qty: 0 | Refills: 0 | DISCHARGE
Start: 2020-03-03 | End: 2020-04-01

## 2020-03-03 RX ORDER — LIDOCAINE 4 G/100G
1 CREAM TOPICAL
Qty: 30 | Refills: 1
Start: 2020-03-03

## 2020-03-03 RX ORDER — OXYCODONE HYDROCHLORIDE 5 MG/1
2 TABLET ORAL
Qty: 60 | Refills: 0
Start: 2020-03-03 | End: 2020-03-07

## 2020-03-03 RX ORDER — ACETAMINOPHEN 500 MG
3 TABLET ORAL
Qty: 0 | Refills: 0 | DISCHARGE
Start: 2020-03-03

## 2020-03-03 RX ORDER — OXYCODONE HYDROCHLORIDE 5 MG/1
1 TABLET ORAL
Qty: 0 | Refills: 0 | DISCHARGE
Start: 2020-03-03 | End: 2020-03-07

## 2020-03-03 RX ORDER — SENNA PLUS 8.6 MG/1
2 TABLET ORAL
Qty: 0 | Refills: 0 | DISCHARGE
Start: 2020-03-03

## 2020-03-03 RX ORDER — APIXABAN 2.5 MG/1
1 TABLET, FILM COATED ORAL
Qty: 60 | Refills: 0
Start: 2020-03-03 | End: 2020-04-01

## 2020-03-03 RX ORDER — LATANOPROST 0.05 MG/ML
1 SOLUTION/ DROPS OPHTHALMIC; TOPICAL
Qty: 1 | Refills: 0
Start: 2020-03-03

## 2020-03-03 RX ORDER — METOPROLOL TARTRATE 50 MG
1 TABLET ORAL
Qty: 60 | Refills: 0
Start: 2020-03-03 | End: 2020-04-01

## 2020-03-03 RX ADMIN — APIXABAN 5 MILLIGRAM(S): 2.5 TABLET, FILM COATED ORAL at 05:28

## 2020-03-03 RX ADMIN — LIDOCAINE 1 APPLICATION(S): 4 CREAM TOPICAL at 17:13

## 2020-03-03 RX ADMIN — Medication 25 MILLIGRAM(S): at 17:14

## 2020-03-03 RX ADMIN — BRIMONIDINE TARTRATE 1 DROP(S): 2 SOLUTION/ DROPS OPHTHALMIC at 05:28

## 2020-03-03 RX ADMIN — OXYCODONE HYDROCHLORIDE 10 MILLIGRAM(S): 5 TABLET ORAL at 05:29

## 2020-03-03 RX ADMIN — OXYCODONE HYDROCHLORIDE 10 MILLIGRAM(S): 5 TABLET ORAL at 06:25

## 2020-03-03 RX ADMIN — Medication 500000 UNIT(S): at 16:02

## 2020-03-03 RX ADMIN — APIXABAN 5 MILLIGRAM(S): 2.5 TABLET, FILM COATED ORAL at 17:14

## 2020-03-03 RX ADMIN — Medication 100 MILLIGRAM(S): at 16:03

## 2020-03-03 RX ADMIN — CHLORHEXIDINE GLUCONATE 1 APPLICATION(S): 213 SOLUTION TOPICAL at 16:04

## 2020-03-03 RX ADMIN — Medication 500000 UNIT(S): at 05:28

## 2020-03-03 RX ADMIN — Medication 250 MILLIGRAM(S): at 05:28

## 2020-03-03 RX ADMIN — ERYTHROPOIETIN 10000 UNIT(S): 10000 INJECTION, SOLUTION INTRAVENOUS; SUBCUTANEOUS at 12:14

## 2020-03-03 RX ADMIN — Medication 1 APPLICATION(S): at 05:29

## 2020-03-03 RX ADMIN — Medication 1 TABLET(S): at 17:13

## 2020-03-03 RX ADMIN — Medication 1 APPLICATION(S): at 16:03

## 2020-03-03 NOTE — PROGRESS NOTE ADULT - REASON FOR ADMISSION
Debility

## 2020-03-03 NOTE — DISCHARGE NOTE NURSING/CASE MANAGEMENT/SOCIAL WORK - PATIENT PORTAL LINK FT
You can access the FollowMyHealth Patient Portal offered by [facility] by registering at the following website: [email or web address]. By joining [employer]’s FollowMyHealth portal, you will also be able to view your health information using other applications (apps) compatible with our system.

## 2020-03-03 NOTE — DISCHARGE NOTE NURSING/CASE MANAGEMENT/SOCIAL WORK - NSDCFUADDAPPT_GEN_ALL_CORE_FT
Follow up with Transplant Clinic this week, office number [phone]  Follow up with primary care MD for referral to wound care clinic for left axilla wound care. Or follow up with Dr Polo, office number [phone], address [address] [place] [address] [address], [place] [address].    Please go to [place], [place] on Thursday, 3/5/20 at 9:30am.  All necessary paperwork has been sent.

## 2020-03-03 NOTE — DISCHARGE NOTE NURSING/CASE MANAGEMENT/SOCIAL WORK - NSDCPEWEB_GEN_ALL_CORE
[facility] for Tobacco Control website --- [email or web address] website --- [email or web address]

## 2020-03-03 NOTE — DISCHARGE NOTE NURSING/CASE MANAGEMENT/SOCIAL WORK - NSDCPEEMAIL_GEN_ALL_CORE
[facility] for Tobacco Control email [email or web address]

## 2020-03-03 NOTE — PROGRESS NOTE ADULT - ASSESSMENT
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 Young, followed by Dr. Raphael, [facility] Oncologist), RA with hand contractures, DDRT 12/8/18 c/b DGF requiring who presented to [facility] on 12/27/19 from dialysis for a fever found to be flu positive treated with tamiflu and have enterobacter infected perinephretic hematoma being admitted here  with Gait Instability, ADL impairments and Functional impairments. His course was complicated by RP hematoma and bladder perforation s/p repair and evacuation on 1/10, saddle PE s/p IVC filter on 1/10, acute DVT of RUE brachial vein, LUE AVF bleeding left deep brachial artery psuedoaneurysm, and cardiac arrest.     #Debility due to prolonged and complicated hospital course  -Completed Comprehensive Rehab Program of PT/OT    #DDRT: c/b DGF/ATN/perinephric infected hematoma/pseudoneurysm/graft nephrectomy   - S/P AVF cannulation for HD c/b infiltration causing extravasation/bleeding to the left upper arm and chest noted on CTA  - S/P IR LUE arteriography showing a tiny pseudoaneurysm arising from a distal branch of the left deep brachial artery s/p dissection of a tiny feeder artery that resulted in resolution of the pseudoaneurysm.  - S/P IR permacath placement with TTS HD  - Eliquis 5mg BID and hold Aspirin    - Unable to continue with ciprofloxacin for abx course due to prolonged Qtc. ID consulted and completed one dose of amikacin 500mg after dialysis on 2/18 in order to complete abx course. Per ID, no need for additional antibiotics.  - pain control: Tylenol, Oxycodone. Neurontin discontinued on 2/15 due to prolonged QTc  - Wound care consult  appreciated 2/14  -Follow up visit by Dr Polo 2/26, Vascular surgery as well: recs to continue local wound care and to not use left arm AVF for 6-8 weeks.   - Thigh high compression stocking/ACE wrap to bilateral LE    - ACE wrap LUE from hand to shoulder  - Nephrology following    #Saddle PE/multiple DVT: RUE brachial DVT,  Right distal brachia vein , acute DVT of Right external iliac, common femoral, femoral veins, Right soleal vein   - S/p IVC filter on 1/9  - Apixaban 5mg BID        #New Afib/Vtach:  -Continue metoprolol  -Patient with prolonged QTC. Seen by hospitalist and cardiology 2/15:  ·	DC cipro and gabapentin  ·	serial ekgs-> 2/23 still prolonged QTC  Cardiology consulted and discontinued amiodarone   -Repeat EKG pending-hospitalist following     #Amiodarone toxicity: rule out per cardiology. LFTs stable. TSH 15  -Follow up CXR-possible fluid overload but might be bc patient was in dyalysis   -Repeat TSH with PCP this week as outpatient       #Elevated TSH  TSH 15  Total T3 66  Free thyroxine 1.5  T4 8.9    ?reactive : repeat this week as outpatient       #HTN:  -Continue metoprolol and hold on HD days per outside hospital    #ESRD: on HD  -Nephrology folling, on HD T/Th/Sat  -permacath care  -Epogen, nephrovite    #Glaucoma:  -Continue with drops    #Gout:  -Allopurinol    #RA:  -Continue with comprehensive rehab  -Continue with pain control as below  -rheumatology consult, appreciate recs    #Thrombocytosis:  -Monitor labs  -Hematology follow up as an outpatient    #Hyponatremia: Resolved      #Anemia: stable   -Continue with epogen-nephrology as outpatient to continue epogen   -Monitor labs    #Pain Mgmt   - Tylenol PRN, Oxycodone PRN. DC neurontin given QTc prolongation    #GI/Bowel Mgmt  -  Continent c/w  Senna,  Miralax  - Continue lactulose PRN    #/Bladder Mgmt -   HD, voids small amounts     #FEN   - Diet - Regular + Thins, Renal Restrictions  - DC famotidine due to prolonged Qtc      Dispo:  Patient is medically stable today for discharge to home with home health services

## 2020-03-03 NOTE — PROGRESS NOTE ADULT - SUBJECTIVE AND OBJECTIVE BOX
[facility] NEPHROLOGY SERVICES, [facility]  NEPHROLOGY AND HYPERTENSION  [address]  [address]  [place] [address]  [phone]    ANNA MARIE PALMER, MD GIANA JOSE, MD BEAN ARAUJO, MD JANAE IVCTORIA, MD BARBARA MONET, MD DEANN GORDON, MD SALUD GENTILE, MD CATRINA WOO, MD    S/p stable HD today    Vital Signs Last 24 Hrs  T(C): 37.2 (03-03-20 @ 14:30), Max: 37.4 (03-02-20 @ 20:41)  T(F): 98.9 (03-03-20 @ 14:30), Max: 99.4 (03-02-20 @ 20:41)  HR: 63 (03-03-20 @ 14:30) (62 - 65)  BP: 151/62 (03-03-20 @ 14:30) (110/68 - 151/62)  BP(mean): 124 (03-03-20 @ 11:00) (124 - 124)  RR: 12 (03-03-20 @ 14:30) (12 - 16)  SpO2: 99% (03-03-20 @ 14:30) (96% - 100%)    Respiratory: b/l air entry  Cardiovascular: S1 S2  Gastrointestinal: soft NT ND +BS  Extremities: tr b/l LE edema                                   8.8    12.90 )-----------( 444      ( 03 Mar 2020 11:08 )             26.6     03 Mar 2020 11:08    137    |  100    |  46     ----------------------------<  97     4.1     |  27     |  7.68     Ca    8.9        03 Mar 2020 11:08  Phos  5.0       03 Mar 2020 11:08    TPro  x      /  Alb  2.4    /  TBili  x      /  DBili  x      /  AST  x      /  ALT  x      /  AlkPhos  x      03 Mar 2020 11:08    LIVER FUNCTIONS - ( 03 Mar 2020 11:08 )  Alb: 2.4 g/dL / Pro: x     / ALK PHOS: x     / ALT: x     / AST: x     / GGT: x           acetaminophen   Tablet .. 975 milliGRAM(s) Oral every 6 hours PRN  allopurinol 100 milliGRAM(s) Oral daily  apixaban 5 milliGRAM(s) Oral two times a day  AQUAPHOR (petrolatum Ointment) 1 Application(s) Topical three times a day  benzocaine 15 mG/menthol 3.6 mG (Sugar-Free) Lozenge 1 Lozenge Oral three times a day PRN  bisacodyl Suppository 10 milliGRAM(s) Rectal daily PRN  brimonidine 0.2% Ophthalmic Solution 1 Drop(s) Both EYES three times a day  chlorhexidine 4% Liquid 1 Application(s) Topical daily  epoetin trey Injectable 56444 Unit(s) IV Push <User Schedule>  hydrocortisone 2.5% Lotion 1 Application(s) Topical every 12 hours PRN  lactulose Syrup 10 Gram(s) Oral daily PRN  latanoprost 0.005% Ophthalmic Solution 1 Drop(s) Both EYES daily  lidocaine 2% Gel 1 Application(s) Topical two times a day  metoprolol tartrate 25 milliGRAM(s) Oral every 12 hours  Nephro-candace 1 Tablet(s) Oral daily  nystatin    Suspension 392107 Unit(s) Oral four times a day  oxyCODONE    IR 5 milliGRAM(s) Oral every 4 hours PRN  oxyCODONE    IR 10 milliGRAM(s) Oral every 4 hours PRN  polyethylene glycol 3350 17 Gram(s) Oral daily  saccharomyces boulardii 250 milliGRAM(s) Oral two times a day  senna 2 Tablet(s) Oral at bedtime  simethicone 80 milliGRAM(s) Chew three times a day PRN    Assessment/Plan:    ESRD on HD  S/p DDRT at [facility] 12/8/19, DGF  S/p transplant kidney bx 12/13: ATN, focal TMA  S/p PLEX 12/13, 12/15; d/c-d 12/20 w/op HD 3 x wk  Re-adm 12/27 to [facility] w/Flu A, infected carlos-nephric hematoma   S/p OR 1/1 for washout and repeat renal graft biopsy (ATN)  Dx w/RLE DVT  S/p OR 1/10 for infected carlos-nephric hematoma, hemorrhage involving pseudo-aneurysm of anastomosis of renal artery to external iliac vein,   s/p transplant nephrectomy, s/p IVCF   S/p PEA arrest 1/13, + PE, R retroperitoneal collection  S/p IR drainage of abd collection  RUE DVT, AC  LUE AVF infiltration 2/1 w/hematoma extending to L chest wall while on Eliquis, s/p PRBCs  LUE wound, vascular following  S/p perm cath  Tx to acute rehab 2/14  HD TTS via perm cath  D/c planning    [phone]

## 2020-03-03 NOTE — DISCHARGE NOTE NURSING/CASE MANAGEMENT/SOCIAL WORK - NSSCTYPOFSERV_GEN_ALL_CORE
RN CALLED ME TO PLACE RODRIGUEZ DUE TO SONO GRAM INDICATING RETENTION AND RN UNABLE TO CATH. UPON MULTIPLE ATTEMPTS      RODRIGUEZ PLACED  KEEP IN PLACE IF MORE THEN 300 CC nursing, pt/ot, a

## 2020-03-10 DIAGNOSIS — I46.9 CARDIAC ARREST, CAUSE UNSPECIFIED: ICD-10-CM

## 2020-03-10 DIAGNOSIS — D64.9 ANEMIA, UNSPECIFIED: ICD-10-CM

## 2020-03-10 DIAGNOSIS — I12.0 HYPERTENSIVE CHRONIC KIDNEY DISEASE WITH STAGE 5 CHRONIC KIDNEY DISEASE OR END STAGE RENAL DISEASE: ICD-10-CM

## 2020-03-10 DIAGNOSIS — M24.549 CONTRACTURE, UNSPECIFIED HAND: ICD-10-CM
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
## 2020-03-10 DIAGNOSIS — M10.9 GOUT, UNSPECIFIED: ICD-10-CM
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
## 2020-03-10 DIAGNOSIS — Z48.812 ENCOUNTER FOR SURGICAL AFTERCARE FOLLOWING SURGERY ON THE CIRCULATORY SYSTEM: ICD-10-CM

## 2020-03-10 DIAGNOSIS — R26.9 UNSPECIFIED ABNORMALITIES OF GAIT AND MOBILITY: ICD-10-CM

## 2020-03-10 DIAGNOSIS — Z86.711 PERSONAL HISTORY OF PULMONARY EMBOLISM: ICD-10-CM

## 2020-03-10 DIAGNOSIS — S21.202A UNSPECIFIED OPEN WOUND OF LEFT BACK WALL OF THORAX WITHOUT PENETRATION INTO THORACIC CAVITY, INITIAL ENCOUNTER: ICD-10-CM

## 2020-03-10 DIAGNOSIS — Z48.816 ENCOUNTER FOR SURGICAL AFTERCARE FOLLOWING SURGERY ON THE GENITOURINARY SYSTEM: ICD-10-CM

## 2020-03-10 DIAGNOSIS — K59.00 CONSTIPATION, UNSPECIFIED: ICD-10-CM

## 2020-03-10 DIAGNOSIS — R94.31 ABNORMAL ELECTROCARDIOGRAM [ECG] [EKG]: ICD-10-CM

## 2020-03-10 DIAGNOSIS — S41.102A UNSPECIFIED OPEN WOUND OF LEFT UPPER ARM, INITIAL ENCOUNTER: ICD-10-CM

## 2020-03-10 DIAGNOSIS — Z86.718 PERSONAL HISTORY OF OTHER VENOUS THROMBOSIS AND EMBOLISM: ICD-10-CM

## 2020-03-10 DIAGNOSIS — M06.9 RHEUMATOID ARTHRITIS, UNSPECIFIED: ICD-10-CM

## 2020-03-10 DIAGNOSIS — I47.2 VENTRICULAR TACHYCARDIA: ICD-10-CM

## 2020-03-10 DIAGNOSIS — T80.89XA OTHER COMPLICATIONS FOLLOWING INFUSION, TRANSFUSION AND THERAPEUTIC INJECTION, INITIAL ENCOUNTER: ICD-10-CM

## 2020-03-10 DIAGNOSIS — Z48.00 ENCOUNTER FOR CHANGE OR REMOVAL OF NONSURGICAL WOUND DRESSING: ICD-10-CM

## 2020-03-10 DIAGNOSIS — R41.840 ATTENTION AND CONCENTRATION DEFICIT: ICD-10-CM

## 2020-03-10 DIAGNOSIS — N18.6 END STAGE RENAL DISEASE: ICD-10-CM

## 2020-03-10 DIAGNOSIS — H40.9 UNSPECIFIED GLAUCOMA: ICD-10-CM

## 2020-03-10 DIAGNOSIS — I48.91 UNSPECIFIED ATRIAL FIBRILLATION: ICD-10-CM

## 2020-03-10 DIAGNOSIS — Z99.2 DEPENDENCE ON RENAL DIALYSIS: ICD-10-CM

## 2020-03-10 DIAGNOSIS — Z95.828 PRESENCE OF OTHER VASCULAR IMPLANTS AND GRAFTS: ICD-10-CM

## 2020-03-10 DIAGNOSIS — R94.6 ABNORMAL RESULTS OF THYROID FUNCTION STUDIES: ICD-10-CM

## 2020-03-10 DIAGNOSIS — R53.81 OTHER MALAISE: ICD-10-CM

## 2020-03-10 DIAGNOSIS — Z51.89 ENCOUNTER FOR OTHER SPECIFIED AFTERCARE: ICD-10-CM

## 2020-03-10 DIAGNOSIS — D47.3 ESSENTIAL (HEMORRHAGIC) THROMBOCYTHEMIA: ICD-10-CM

## 2020-03-10 DIAGNOSIS — R60.9 EDEMA, UNSPECIFIED: ICD-10-CM

## 2020-04-06 ENCOUNTER — APPOINTMENT (OUTPATIENT)
Dept: INTERNAL MEDICINE | Facility: CLINIC | Age: 50
End: 2020-04-06
Payer: MEDICARE

## 2020-04-06 DIAGNOSIS — R06.02 SHORTNESS OF BREATH: ICD-10-CM

## 2020-04-06 PROCEDURE — G2012 BRIEF CHECK IN BY MD/QHP: CPT

## 2020-04-08 ENCOUNTER — APPOINTMENT (OUTPATIENT)
Dept: DISASTER EMERGENCY | Facility: CLINIC | Age: 50
End: 2020-04-08
Payer: MEDICARE

## 2020-04-08 VITALS — HEIGHT: 70 IN | BODY MASS INDEX: 42.61 KG/M2 | WEIGHT: 297.62 LBS

## 2020-04-08 DIAGNOSIS — Z11.3 ENCOUNTER FOR SCREENING FOR INFECTIONS WITH A PREDOMINANTLY SEXUAL MODE OF TRANSMISSION: ICD-10-CM

## 2020-04-08 DIAGNOSIS — Z20.828 CONTACT WITH AND (SUSPECTED) EXPOSURE TO OTHER VIRAL COMMUNICABLE DISEASES: ICD-10-CM

## 2020-04-08 DIAGNOSIS — H61.22 IMPACTED CERUMEN, LEFT EAR: ICD-10-CM

## 2020-04-08 DIAGNOSIS — M10.222: ICD-10-CM

## 2020-04-08 LAB — SARS-COV-2 N GENE NPH QL NAA+PROBE: NOT DETECTED

## 2020-04-08 PROCEDURE — 99213 OFFICE O/P EST LOW 20 MIN: CPT

## 2020-04-15 ENCOUNTER — APPOINTMENT (OUTPATIENT)
Dept: INTERNAL MEDICINE | Facility: CLINIC | Age: 50
End: 2020-04-15
Payer: MEDICARE

## 2020-04-15 PROCEDURE — G2012 BRIEF CHECK IN BY MD/QHP: CPT

## 2020-04-22 ENCOUNTER — APPOINTMENT (OUTPATIENT)
Dept: INTERNAL MEDICINE | Facility: CLINIC | Age: 50
End: 2020-04-22
Payer: MEDICARE

## 2020-04-22 DIAGNOSIS — M79.603 PAIN IN ARM, UNSPECIFIED: ICD-10-CM

## 2020-04-22 PROCEDURE — 99442: CPT

## 2020-04-22 RX ORDER — METOPROLOL TARTRATE 50 MG/1
50 TABLET, FILM COATED ORAL
Qty: 180 | Refills: 0 | Status: DISCONTINUED | COMMUNITY
Start: 2017-10-12 | End: 2020-04-22

## 2020-05-11 ENCOUNTER — APPOINTMENT (OUTPATIENT)
Dept: INTERNAL MEDICINE | Facility: CLINIC | Age: 50
End: 2020-05-11
Payer: MEDICARE

## 2020-05-11 PROCEDURE — 99442: CPT

## 2020-05-21 PROBLEM — Z20.828 SUSPECTED COVID-19 VIRUS INFECTION: Status: RESOLVED | Noted: 2020-04-06 | Resolved: 2020-05-21

## 2020-05-21 PROBLEM — Z20.828 CONTACT WITH AND (SUSPECTED) EXPOSURE TO OTHER VIRAL COMMUNICABLE DISEASES: Status: ACTIVE | Noted: 2020-05-21

## 2020-05-21 NOTE — HISTORY OF PRESENT ILLNESS
[Patient presents to the office today for COVID-19 evaluation and testing.] : Patient presents to the office today for COVID-19 evaluation and testing. [Patient has been pre-screened by RN at call center for appointment today with our facility.] : Patient has been pre-screened by RN at call center for appointment today with our facility. [] : no dizziness on standing [None Known] : none known [None] : none [Clear] : clear [Speaks in full sentences] : speaks in full sentences [Normal O2 sat at rest] : normal O2 sat at rest [Grossly normal, interacts, not tired or weak] : grossly normal, interacts, not tired or weak [COVID-19 testing ordered and specimen obtained] : COVID-19 testing ordered and specimen obtained [Discharged with current Quarantine instructions and advised of signs of worsening illness.] : Patient discharged with current quarantine instructions and advised of signs of worsening illness. Patient told to seek emergent care if symptoms occur. [FreeTextEntry1] : Sai is a 49 year old male with complaints of SOB, non productive cough, and headache x 1 week. He reports his PCP Dr. Cosby wanted patient to be tested.  He lives with mother and son. He denies chest pain/pressure, palpitations, fever, chills, nausea/vomiting, diarrhea or syncope. He reports muscle pain which he attributes to his history of RA. [TextBox_107] : COVID-19 nasal swab preformed and ordered in office today. Advised test results may take 3-7 days to return. Discussed diagnostic accuracy and possibility of false negative results.  All patients close contacts should also self quarantine.  Close contacts with comorbidities at higher risk of COVID disease.  Social distancing once quarantine is completed.  If high risk symptoms of chest discomfort, severe shortness of breath at rest, worsening shortness of breath with minimal exertion, new or worsening wheezing, dizziness then instructed to seek immediate medical evaluation.  A letter for work and patient education form was provided.  The above plan was reviewed with the patient.  All questions have been answered.  Instructed to call if any new symptoms\par  \par Patient education provided for COVID-19. Explained if symptoms such as SOB or fever progress, patient instructed to go to ED.  Discussed isolation precautions and handwashing techniques. Social distancing reviewed. Utilize Tylenol for fever >101. No signs of cardiovascular decompensation. Patient verbalized understanding of provided instructions. Tests results may take up to 3-7 days to result. Discussed possibility of false negative results. Instructed to self quarantine and must remain quarantined x 14 days and no fever for three days without antipyretic medication.  All patient close contacts should also self quarantine. Social distancing once quarantine is completed. If patient has symptoms of chest discomfort, severe SOB at rest, worsening shortness of breath with minimal exertion, new or worsening wheezing, dizziness were instructed to seek immediate medical evaluation. \par \par \par  [TextBox_57] : h [TextBox_66] : hx of ESRD on HD, HTN

## 2020-07-07 ENCOUNTER — NON-APPOINTMENT (OUTPATIENT)
Age: 50
End: 2020-07-07

## 2020-07-07 ENCOUNTER — APPOINTMENT (OUTPATIENT)
Dept: CARDIOLOGY | Facility: CLINIC | Age: 50
End: 2020-07-07
Payer: MEDICARE

## 2020-07-07 VITALS
HEART RATE: 65 BPM | HEIGHT: 70 IN | BODY MASS INDEX: 20.62 KG/M2 | DIASTOLIC BLOOD PRESSURE: 88 MMHG | WEIGHT: 144 LBS | SYSTOLIC BLOOD PRESSURE: 159 MMHG

## 2020-07-07 VITALS — DIASTOLIC BLOOD PRESSURE: 80 MMHG | SYSTOLIC BLOOD PRESSURE: 138 MMHG

## 2020-07-07 PROCEDURE — 99215 OFFICE O/P EST HI 40 MIN: CPT

## 2020-07-07 PROCEDURE — 93000 ELECTROCARDIOGRAM COMPLETE: CPT

## 2020-07-07 RX ORDER — ALLOPURINOL 100 MG/1
100 TABLET ORAL DAILY
Qty: 30 | Refills: 11 | Status: DISCONTINUED | COMMUNITY
Start: 2019-12-18 | End: 2020-07-07

## 2020-07-07 RX ORDER — VIT B COMP NO.3/FOLIC/C/BIOTIN 1 MG-60 MG
1 TABLET ORAL
Qty: 30 | Refills: 0 | Status: DISCONTINUED | COMMUNITY
Start: 2018-07-04 | End: 2020-07-07

## 2020-07-07 RX ORDER — NYSTATIN 100000 [USP'U]/ML
100000 SUSPENSION ORAL 4 TIMES DAILY
Qty: 2 | Refills: 2 | Status: DISCONTINUED | COMMUNITY
Start: 2019-12-09 | End: 2020-07-07

## 2020-07-07 RX ORDER — MYCOPHENOLATE MOFETIL 500 MG/1
500 TABLET ORAL
Qty: 120 | Refills: 11 | Status: DISCONTINUED | COMMUNITY
Start: 2019-12-09 | End: 2020-07-07

## 2020-07-07 RX ORDER — FAMOTIDINE 20 MG/1
20 TABLET, FILM COATED ORAL
Qty: 90 | Refills: 3 | Status: DISCONTINUED | COMMUNITY
Start: 2019-12-09 | End: 2020-07-07

## 2020-07-07 RX ORDER — PREDNISONE 10 MG/1
10 TABLET ORAL DAILY
Qty: 30 | Refills: 11 | Status: DISCONTINUED | COMMUNITY
Start: 2019-12-18 | End: 2020-07-07

## 2020-07-07 RX ORDER — BELATACEPT 250 MG/1
250 INJECTION, POWDER, LYOPHILIZED, FOR SOLUTION INTRAVENOUS ONCE
Qty: 3 | Refills: 3 | Status: DISCONTINUED | OUTPATIENT
Start: 2019-12-18 | End: 2020-07-07

## 2020-07-07 RX ORDER — VALGANCICLOVIR HYDROCHLORIDE 450 MG/1
450 TABLET ORAL
Qty: 30 | Refills: 5 | Status: DISCONTINUED | COMMUNITY
Start: 2019-12-09 | End: 2020-07-07

## 2020-07-07 RX ORDER — HYDRALAZINE HYDROCHLORIDE 50 MG/1
50 TABLET ORAL
Qty: 60 | Refills: 11 | Status: DISCONTINUED | COMMUNITY
Start: 2019-12-18 | End: 2020-07-07

## 2020-07-07 RX ORDER — SENNOSIDES 8.6 MG
8.6 TABLET ORAL DAILY
Qty: 90 | Refills: 3 | Status: DISCONTINUED | COMMUNITY
Start: 2019-12-09 | End: 2020-07-07

## 2020-07-07 RX ORDER — HYDRALAZINE HYDROCHLORIDE 50 MG/1
50 TABLET ORAL 3 TIMES DAILY
Qty: 90 | Refills: 0 | Status: DISCONTINUED | COMMUNITY
Start: 2019-08-05 | End: 2020-07-07

## 2020-07-07 RX ORDER — SULFAMETHOXAZOLE AND TRIMETHOPRIM 400; 80 MG/1; MG/1
400-80 TABLET ORAL DAILY
Qty: 30 | Refills: 11 | Status: DISCONTINUED | COMMUNITY
Start: 2019-12-09 | End: 2020-07-07

## 2020-07-07 RX ORDER — BELATACEPT 250 MG/1
250 INJECTION, POWDER, LYOPHILIZED, FOR SOLUTION INTRAVENOUS
Qty: 2 | Refills: 11 | Status: DISCONTINUED | OUTPATIENT
Start: 2019-12-18 | End: 2020-07-07

## 2020-07-07 RX ORDER — GABAPENTIN 100 MG/1
100 CAPSULE ORAL TWICE DAILY
Qty: 60 | Refills: 0 | Status: DISCONTINUED | COMMUNITY
Start: 2017-06-21 | End: 2020-07-07

## 2020-07-07 RX ORDER — VIT B COMP NO.3/FOLIC/C/BIOTIN 1 MG-60 MG
1 TABLET ORAL
Qty: 30 | Refills: 0 | Status: DISCONTINUED | COMMUNITY
Start: 2017-10-02 | End: 2020-07-07

## 2020-07-08 ENCOUNTER — APPOINTMENT (OUTPATIENT)
Dept: CARDIOLOGY | Facility: CLINIC | Age: 50
End: 2020-07-08
Payer: MEDICARE

## 2020-07-08 PROCEDURE — 93015 CV STRESS TEST SUPVJ I&R: CPT

## 2020-07-08 PROCEDURE — 78452 HT MUSCLE IMAGE SPECT MULT: CPT

## 2020-07-08 PROCEDURE — A9500: CPT

## 2020-07-10 ENCOUNTER — APPOINTMENT (OUTPATIENT)
Dept: CARDIOLOGY | Facility: CLINIC | Age: 50
End: 2020-07-10
Payer: MEDICARE

## 2020-07-10 PROCEDURE — 93306 TTE W/DOPPLER COMPLETE: CPT

## 2020-07-10 NOTE — PHYSICAL EXAM
[Well Groomed] : well groomed [Normal Conjunctiva] : the conjunctiva exhibited no abnormalities [General Appearance - In No Acute Distress] : no acute distress [Eyelids - No Xanthelasma] : the eyelids demonstrated no xanthelasmas [Normal Oral Mucosa] : normal oral mucosa [No Oral Pallor] : no oral pallor [No Oral Cyanosis] : no oral cyanosis [Exaggerated Use Of Accessory Muscles For Inspiration] : no accessory muscle use [Respiration, Rhythm And Depth] : normal respiratory rhythm and effort [Abdomen Tenderness] : non-tender [Abdomen Soft] : soft [Auscultation Breath Sounds / Voice Sounds] : lungs were clear to auscultation bilaterally [Abdomen Mass (___ Cm)] : no abdominal mass palpated [Abnormal Walk] : normal gait [Petechial Hemorrhages (___cm)] : no petechial hemorrhages [Skin Color & Pigmentation] : normal skin color and pigmentation [] : no rash [Mood] : the mood was normal [Oriented To Time, Place, And Person] : oriented to person, place, and time [Affect] : the affect was normal [No Anxiety] : not feeling anxious [Normal Rate] : normal [Rhythm Regular] : regular [Normal S1] : normal S1 [No Gallop] : no gallop heard [Normal S2] : normal S2 [2+] : left 2+ [No Pitting Edema] : no pitting edema present [FreeTextEntry1] : no jvd [Right Carotid Bruit] : no bruit heard over the right carotid [Left Carotid Bruit] : no bruit heard over the left carotid [Bruit] : no bruit heard

## 2020-07-10 NOTE — DISCUSSION/SUMMARY
[FreeTextEntry1] : 49 year man with a history as listed presents for a followup cardiac evaluation. \par Sai had a long hospitalization recently with a course complicated by a cardiac arrest, PE, AF. \par He currently is maintaining SR. He is off of amiodarone as it was noted in rehab  that his QTc was long. It appears that he is maintaining SR. \par His anticoagulation will be an issue. He is off AC secondary to cost and ?hematomas. He needs to followup with Heme to determine if he needs life long AC as he had a life threatening PE resulting in severe PHTN. He also would likely benefit from CVA risk reduction POV. \par Eventually he will need a 30 day event to rule out  occult AF.  He will continue Metoprolol 25mg Q12. \par He will need a repeat echo to reassess his pulmonary pressures. \par Given his likely Type II MI during his hospitalization I would check a pharmacological nuclear stress test prior to his AVF placement. \par Volume removal via HD. He will followup with renal. \par Exercise and diet counseling was performed in order to reduce her future cardiovascular risk. \par He will followup with me in 1 month or sooner if necessary. \par  \par

## 2020-07-10 NOTE — HISTORY OF PRESENT ILLNESS
[FreeTextEntry1] : 49-year-old man with a history of end-stage renal disease on dialysis on the renal transplant list, rheumatoid arthritis,hypertension.\par \par he presented to  in 12/2020 with Flu.  He is s/p washout of infected collection and biopsy which revealed ATN.  He was found to have diffusely enlarging perinephric fluid collection.\par While on telemetry, he had an episode of a wide complex tachycardia. It initially appears irregular, suggesting an atrial arrhythmia with aberrancy, though the remainder appears more like an episode of non-sustained VT. s /p washout on 1/1/2020 with worsening sepsis and transfer to ICU, s/p IR drainage of perinephric collection on 1/6. 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 He underwent operative repair of right external iliac artery with pericardial patch, transplant nephrectomy, RLE thrombectomy, and IVC filter on 1/9. \par Then he had a s/p cardiac arrest likely from PE s/p TPA with  course complicated by PAFw rvr, and placed on amio,  \par He then returned to the SICU with significant lue swelling and active arterial extravasation, with drop in blood counts. He was then discharged to rehab once optimized. \par He had an echo in 2/2020 showing low normal LV function but severe PHTN. \par  \par He is now here for a hospital followup. he having a AVF put at [facility] with Dr. Rivas on 7/21/20\par He stopped taken his eliquis for about 2 weeks. he noted that he had more hematoma at home. \par  \par Hs dyspnea on exertion has improved overall. He denies any PND, orthopnea, lower extremity edema, near syncope, syncope, stroke like symptoms, palpitations.\par He is now on HD. He is tolerating volume removal without any significant drops in BP. \par He is compliant with his medications. \par  HIs dry weight is 63kg.\par

## 2020-07-10 NOTE — HISTORY OF PRESENT ILLNESS
[FreeTextEntry1] : 49-year-old man with a history of end-stage renal disease on dialysis on the renal transplant list, rheumatoid arthritis,hypertension.\par \par he presented to  in 12/2020 with Flu.  He is s/p washout of infected collection and biopsy which revealed ATN.  He was found to have diffusely enlarging perinephric fluid collection.\par While on telemetry, he had an episode of a wide complex tachycardia. It initially appears irregular, suggesting an atrial arrhythmia with aberrancy, though the remainder appears more like an episode of non-sustained VT. s /p washout on 1/1/2020 with worsening sepsis and transfer to ICU, s/p IR drainage of perinephric collection on 1/6. 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 He underwent operative repair of right external iliac artery with pericardial patch, transplant nephrectomy, RLE thrombectomy, and IVC filter on 1/9. \par Then he had a s/p cardiac arrest likely from PE s/p TPA with  course complicated by PAFw rvr, and placed on amio,  \par He then returned to the SICU with significant lue swelling and active arterial extravasation, with drop in blood counts. He was then discharged to rehab once optimized. \par He had an echo in 2/2020 showing low normal LV function but severe PHTN. \par  \par He is now here for a hospital followup. he having a AVF put at [facility] with Dr. Rivas on 7/21/20\par He stopped taken his eliquis for about 2 weeks. he noted that he had more hematoma at home. \par  \par Hs dyspnea on exertion has improved overall. He denies any PND, orthopnea, lower extremity edema, near syncope, syncope, stroke like symptoms, palpitations.\par He is now on HD. He is tolerating volume removal without any significant drops in BP. \par He is compliant with his medications. \par  HIs dry weight is 63kg.\par

## 2020-07-10 NOTE — PHYSICAL EXAM
[Well Groomed] : well groomed [General Appearance - In No Acute Distress] : no acute distress [Eyelids - No Xanthelasma] : the eyelids demonstrated no xanthelasmas [Normal Conjunctiva] : the conjunctiva exhibited no abnormalities [Normal Oral Mucosa] : normal oral mucosa [No Oral Pallor] : no oral pallor [No Oral Cyanosis] : no oral cyanosis [Respiration, Rhythm And Depth] : normal respiratory rhythm and effort [Exaggerated Use Of Accessory Muscles For Inspiration] : no accessory muscle use [Abdomen Tenderness] : non-tender [Auscultation Breath Sounds / Voice Sounds] : lungs were clear to auscultation bilaterally [Abdomen Soft] : soft [Abdomen Mass (___ Cm)] : no abdominal mass palpated [Abnormal Walk] : normal gait [Petechial Hemorrhages (___cm)] : no petechial hemorrhages [Skin Color & Pigmentation] : normal skin color and pigmentation [] : no rash [Oriented To Time, Place, And Person] : oriented to person, place, and time [Affect] : the affect was normal [Mood] : the mood was normal [Normal Rate] : normal [No Anxiety] : not feeling anxious [Rhythm Regular] : regular [Normal S1] : normal S1 [No Gallop] : no gallop heard [Normal S2] : normal S2 [2+] : left 2+ [No Pitting Edema] : no pitting edema present [FreeTextEntry1] : no jvd [Right Carotid Bruit] : no bruit heard over the right carotid [Left Carotid Bruit] : no bruit heard over the left carotid [Bruit] : no bruit heard

## 2020-07-10 NOTE — REVIEW OF SYSTEMS
[Dyspnea on exertion] : dyspnea during exertion [Negative] : Heme/Lymph [Shortness Of Breath] : no shortness of breath [Lower Ext Edema] : no extremity edema [Chest  Pressure] : no chest pressure

## 2020-07-15 ENCOUNTER — OUTPATIENT (OUTPATIENT)
Dept: OUTPATIENT SERVICES | Facility: HOSPITAL | Age: 50
LOS: 1 days | Discharge: HOME | End: 2020-07-15

## 2020-07-15 DIAGNOSIS — T83.410S BREAKDOWN (MECHANICAL) OF IMPLANTED PENILE PROSTHESIS, SEQUELA: Chronic | ICD-10-CM

## 2020-07-15 DIAGNOSIS — Z98.89 OTHER SPECIFIED POSTPROCEDURAL STATES: Chronic | ICD-10-CM

## 2020-07-15 DIAGNOSIS — N18.6 END STAGE RENAL DISEASE: Chronic | ICD-10-CM

## 2020-07-15 DIAGNOSIS — I77.0 ARTERIOVENOUS FISTULA, ACQUIRED: Chronic | ICD-10-CM

## 2020-07-15 DIAGNOSIS — Z94.0 KIDNEY TRANSPLANT STATUS: Chronic | ICD-10-CM

## 2020-07-15 DIAGNOSIS — L02.31 CUTANEOUS ABSCESS OF BUTTOCK: Chronic | ICD-10-CM

## 2020-07-15 DIAGNOSIS — C25.9 MALIGNANT NEOPLASM OF PANCREAS, UNSPECIFIED: ICD-10-CM

## 2020-07-16 ENCOUNTER — APPOINTMENT (OUTPATIENT)
Dept: INTERNAL MEDICINE | Facility: CLINIC | Age: 50
End: 2020-07-16
Payer: MEDICARE

## 2020-07-16 VITALS
SYSTOLIC BLOOD PRESSURE: 101 MMHG | BODY MASS INDEX: 20.33 KG/M2 | TEMPERATURE: 97.8 F | OXYGEN SATURATION: 96 % | HEART RATE: 75 BPM | RESPIRATION RATE: 17 BRPM | WEIGHT: 142 LBS | HEIGHT: 70 IN | DIASTOLIC BLOOD PRESSURE: 67 MMHG

## 2020-07-16 DIAGNOSIS — Z01.818 ENCOUNTER FOR OTHER PREPROCEDURAL EXAMINATION: ICD-10-CM

## 2020-07-16 PROCEDURE — 99215 OFFICE O/P EST HI 40 MIN: CPT

## 2020-07-17 ENCOUNTER — OUTPATIENT (OUTPATIENT)
Dept: OUTPATIENT SERVICES | Facility: HOSPITAL | Age: 50
LOS: 1 days | End: 2020-07-17
Payer: COMMERCIAL

## 2020-07-17 ENCOUNTER — APPOINTMENT (OUTPATIENT)
Dept: HEMATOLOGY ONCOLOGY | Facility: CLINIC | Age: 50
End: 2020-07-17
Payer: MEDICARE

## 2020-07-17 ENCOUNTER — RESULT REVIEW (OUTPATIENT)
Age: 50
End: 2020-07-17

## 2020-07-17 ENCOUNTER — APPOINTMENT (OUTPATIENT)
Dept: ULTRASOUND IMAGING | Facility: IMAGING CENTER | Age: 50
End: 2020-07-17
Payer: MEDICARE

## 2020-07-17 VITALS
TEMPERATURE: 98.1 F | HEIGHT: 70 IN | DIASTOLIC BLOOD PRESSURE: 68 MMHG | SYSTOLIC BLOOD PRESSURE: 106 MMHG | RESPIRATION RATE: 16 BRPM | WEIGHT: 141.98 LBS | HEART RATE: 70 BPM | BODY MASS INDEX: 20.33 KG/M2 | OXYGEN SATURATION: 100 %

## 2020-07-17 DIAGNOSIS — T83.410S BREAKDOWN (MECHANICAL) OF IMPLANTED PENILE PROSTHESIS, SEQUELA: Chronic | ICD-10-CM

## 2020-07-17 DIAGNOSIS — Z98.89 OTHER SPECIFIED POSTPROCEDURAL STATES: Chronic | ICD-10-CM

## 2020-07-17 DIAGNOSIS — I77.0 ARTERIOVENOUS FISTULA, ACQUIRED: Chronic | ICD-10-CM

## 2020-07-17 DIAGNOSIS — L02.31 CUTANEOUS ABSCESS OF BUTTOCK: Chronic | ICD-10-CM

## 2020-07-17 DIAGNOSIS — I26.02 SADDLE EMBOLUS OF PULMONARY ARTERY WITH ACUTE COR PULMONALE: ICD-10-CM

## 2020-07-17 DIAGNOSIS — N18.6 END STAGE RENAL DISEASE: Chronic | ICD-10-CM

## 2020-07-17 DIAGNOSIS — Z94.0 KIDNEY TRANSPLANT STATUS: Chronic | ICD-10-CM

## 2020-07-17 PROCEDURE — 93970 EXTREMITY STUDY: CPT

## 2020-07-17 PROCEDURE — 99215 OFFICE O/P EST HI 40 MIN: CPT

## 2020-07-17 PROCEDURE — 93970 EXTREMITY STUDY: CPT | Mod: 26

## 2020-07-17 NOTE — ASSESSMENT
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 Raphael. \par \par -Given patient's complicated surgical course from December 2019-January 2020, and complete immobility at that time, patient's saddle PE is considered a provoked thrombosis. Therefore, full hypercoagulable workup is not indicated at this time. Patient had multiple surgeries as well as tacrolimus induced TMA, a known hypercoagulable state. \par -Patient had IVC placed at that time, which according to the patient has not been retrieved at this point. Will check bilateral lower extremity ultrasound to assess for residual thrombosis. Will also plan to check CTPA, however need to discuss with nephrology to coordinate possible contrast. \par -Patient is much improved from his hospitalization, with a good performance status at this point. No anticoagulation indicated now, especially considering multiple hemorrhagic complications, unless new thrombosis found. \par -Platelet count normal on recent check July 13th 2020, frequent blood draws with HD. Likely was reactive. \par -Follow up in one month.

## 2020-07-17 NOTE — PHYSICAL EXAM
[Restricted in physically strenuous activity but ambulatory and able to carry out work of a light or sedentary nature] : Status 1- Restricted in physically strenuous activity but ambulatory and able to carry out work of a light or sedentary nature, e.g., light house work, office work [Thin] : thin [Normal] : affect appropriate [Ulcers] : no ulcers [Mucositis] : no mucositis [Thrush] : no thrush [de-identified] : permacath in place.

## 2020-07-17 NOTE — HISTORY OF PRESENT ILLNESS
[de-identified] : Patient is a 49 y/o M with multiple comorbidities, previously with ESRD on HD ( via LUE AVF), NET of pancreas (s/p robotic distal panc/splenectomy at [place] 2015 by Dr. Young, and now followed by Dr. Raphael), who presented to [facility] on 12/27/19 from dialysis for a fever s/p DDRT on 12/8/19 (ureteral stent sutured to Kennedy - removed 12/15). His post-op course was c/b delayed graft function requiring HD, and evidence of thrombotic microangiopathy (thrombocytopenia, schistocytes on peripheral smear, high LDH, low haptoglobin), likely due to tacrolimus. Tacrolimus held, received Belatacept 12/13 and started on PLEX (12/12, 12/15). Underwent renal bx on 12/13 c/w profound ATN due to TMA. Improved off tacrolimus and he was discharged home on 12/20/19 with outpatient HD. \par \par Patient was then sent back to [facility] ED on 12/17/19 by dialysis center after he was found to be febrile to 102. He was found to be flu + and had a positive urine culture with Enterobacter. ID was consulted and pt was started on Tamiflu and Meropenem. Transplant team and Nephrology was following patient for HD. Pt was taken to the OR on 1/1 for evacuation of perinephretic hematoma which was infected with enterobacter and repeat biopsy of transplanted kidney. Biopsy revealed ATN. Pt was recommended to continue on Meropenam as per ID. Pt also had a CT guided pelvic fluid aspiration by IR on 1/6. Post procedure pt had significant hematuria with clots for which urology was consulted. Pt placed on CBI and hematuria resolved. Pt had multiple antibiotics and antifungals added as per ID recommendations to due to rising WBC. Pt was taken back to the OR on 1/10 for evacuation of RP hematoma removal of external iliac artery stent, bovine pericardial patch repair of ext iliac artery, and ligation of external iliac vein. Pt was emergently taken to the OR after IR for explantation of transplanted kidney and repair of bladder perforation by transplant surgery. Vascular surgery was then called intra-operatively and repaired the right external iliac artery was repaired with a bovine pericardial patch, repaired the right external iliac vein, performed a thrombectomy of the right lower extremity veins and had an IVC filter placed. He had a renal US and IR guided drain and pigtail placed on 1/15.\par \par His course was notable on 1/8 for RLE DVT involving ext iliac, common fem veins, s/p thromobectomy and IVC filter placement complicated by cardiac arrest on 1/13 due to massive acute saddle pulmonary embolism with acute cor pulmonale and acute DVT of the brachial vein in RUE. Pt was started on Eliquis at that time. His course was complicated on 2/1 with AVF infiltrated during HD with aterial extravasation/bleeding to the left upper arm and chest. On 2/3, he had an arteriography, which demonstrated a tiny pseudoaneurysm arising from a distal branch of the left deep brachial artery and so permacath was placed on 2/11. He was also noted to have newly diagnosed afib and cardiology was following for medication management. Platelet count uptrended throughout hospitalization. \par \par Patient medically stabilized and transferred to [facility] 2/14/20 for IRF services. While in rehab, patient made functional progress in therapies and was eventually stable for discharge home.  [de-identified] : Since discharge from rehab facility, patient reports he has felt "the best [he] has ever felt", as he reports that his strength is way up. He reports that he back to his "dry weight', and he denies any chest pain or dyspnea even on exertion. Denies palpitations, hasn't had any fevers or chills. He receives HD three times weekly via permacath. He denies any lower extremity edema. He is due to have AVF procedure.

## 2020-07-24 ENCOUNTER — APPOINTMENT (OUTPATIENT)
Dept: INTERNAL MEDICINE | Facility: CLINIC | Age: 50
End: 2020-07-24
Payer: MEDICARE

## 2020-07-24 DIAGNOSIS — I46.9 CARDIAC ARREST, CAUSE UNSPECIFIED: ICD-10-CM

## 2020-07-24 PROCEDURE — 99214 OFFICE O/P EST MOD 30 MIN: CPT | Mod: 95

## 2020-07-27 PROBLEM — I46.9 CARDIAC ARREST: Status: ACTIVE | Noted: 2017-06-21

## 2020-07-29 NOTE — HISTORY OF PRESENT ILLNESS
[Home] : at home, [unfilled] , at the time of the visit. [Medical Office: ([facility])___] : at the medical office located in  [FreeTextEntry1] : f/u IVC filter assessment and need for possible removal \par \par Sai Prado was seen by hematology for hx of thrombophilia/DVT\par Impression:   thrombophilia/DVT 2/2 his transplant surgery and the TMA he suffered due to the calcineurin inhibitor, also he had a provoked thrombosis and he will not require any further anticoagulation, especially when considering his high risk of bleeding. Hematology was concern with IVC filter that is still in place. we will ask AVF surgery to consider possibly removing his IVF because that can be a nidus for thrombosis itself. In addition, I did order an ultrasound of his b/l lower extremities which is clear of DVT [Verbal consent obtained from patient] : the patient, [unfilled] [de-identified] : \par I spoke to Sai and he recalls having the IVC placed during his Jan-Feb admission. I reviewed those records and an IVC filter was placed on Jan 9th by interventional radiology b/c he had on-going hematuria and a new RLE DVT.  Dr. Siri Valle was the vascular radiologist. His vascular surgery was Dr. Courtney Villalta \par \par

## 2020-08-21 ENCOUNTER — APPOINTMENT (OUTPATIENT)
Dept: INTERNAL MEDICINE | Facility: CLINIC | Age: 50
End: 2020-08-21
Payer: MEDICARE

## 2020-08-21 VITALS
HEIGHT: 70 IN | SYSTOLIC BLOOD PRESSURE: 130 MMHG | TEMPERATURE: 98.5 F | BODY MASS INDEX: 21.47 KG/M2 | HEART RATE: 99 BPM | RESPIRATION RATE: 17 BRPM | WEIGHT: 150 LBS | OXYGEN SATURATION: 95 % | DIASTOLIC BLOOD PRESSURE: 80 MMHG

## 2020-08-21 DIAGNOSIS — T14.8XXA OTHER INJURY OF UNSPECIFIED BODY REGION, INITIAL ENCOUNTER: ICD-10-CM

## 2020-08-21 PROCEDURE — 99214 OFFICE O/P EST MOD 30 MIN: CPT

## 2020-08-22 ENCOUNTER — OUTPATIENT (OUTPATIENT)
Dept: OUTPATIENT SERVICES | Facility: HOSPITAL | Age: 50
LOS: 1 days | Discharge: HOME | End: 2020-08-22

## 2020-08-22 DIAGNOSIS — N18.6 END STAGE RENAL DISEASE: Chronic | ICD-10-CM

## 2020-08-22 DIAGNOSIS — C25.9 MALIGNANT NEOPLASM OF PANCREAS, UNSPECIFIED: ICD-10-CM

## 2020-08-22 DIAGNOSIS — Z98.89 OTHER SPECIFIED POSTPROCEDURAL STATES: Chronic | ICD-10-CM

## 2020-08-22 DIAGNOSIS — L02.31 CUTANEOUS ABSCESS OF BUTTOCK: Chronic | ICD-10-CM

## 2020-08-22 DIAGNOSIS — T83.410S BREAKDOWN (MECHANICAL) OF IMPLANTED PENILE PROSTHESIS, SEQUELA: Chronic | ICD-10-CM

## 2020-08-22 DIAGNOSIS — I77.0 ARTERIOVENOUS FISTULA, ACQUIRED: Chronic | ICD-10-CM

## 2020-08-22 DIAGNOSIS — Z94.0 KIDNEY TRANSPLANT STATUS: Chronic | ICD-10-CM

## 2020-08-25 LAB
ALBUMIN SERPL ELPH-MCNC: 4.3 G/DL
ALP BLD-CCNC: 337 U/L
ALT SERPL-CCNC: 15 U/L
ANION GAP SERPL CALC-SCNC: 19 MMOL/L
AST SERPL-CCNC: 29 U/L
BILIRUB SERPL-MCNC: 0.5 MG/DL
BUN SERPL-MCNC: 39 MG/DL
CALCIUM SERPL-MCNC: 8.6 MG/DL
CHLORIDE SERPL-SCNC: 95 MMOL/L
CO2 SERPL-SCNC: 26 MMOL/L
CREAT SERPL-MCNC: 6.71 MG/DL
DEPRECATED D DIMER PPP IA-ACNC: 494 NG/ML DDU
GLUCOSE SERPL-MCNC: 77 MG/DL
HAV IGM SER QL: NONREACTIVE
HBV CORE IGM SER QL: NONREACTIVE
HBV SURFACE AG SER QL: NONREACTIVE
HCV AB SER QL: NONREACTIVE
HCV S/CO RATIO: 0.11 S/CO
POTASSIUM SERPL-SCNC: 5.3 MMOL/L
PROT SERPL-MCNC: 7.7 G/DL
PSA FREE FLD-MCNC: 18 %
PSA FREE SERPL-MCNC: 0.25 NG/ML
PSA SERPL-MCNC: 1.38 NG/ML
SODIUM SERPL-SCNC: 140 MMOL/L
TSH SERPL-ACNC: 4.75 UIU/ML

## 2020-08-28 ENCOUNTER — APPOINTMENT (OUTPATIENT)
Dept: HEMATOLOGY ONCOLOGY | Facility: CLINIC | Age: 50
End: 2020-08-28
Payer: MEDICARE

## 2020-08-28 ENCOUNTER — RESULT REVIEW (OUTPATIENT)
Age: 50
End: 2020-08-28

## 2020-08-28 DIAGNOSIS — I26.02 SADDLE EMBOLUS OF PULMONARY ARTERY WITH ACUTE COR PULMONALE: ICD-10-CM

## 2020-08-28 LAB
ANISOCYTOSIS BLD QL: SLIGHT — SIGNIFICANT CHANGE UP
BASOPHILS # BLD AUTO: 0 K/UL — SIGNIFICANT CHANGE UP (ref 0–0.2)
BASOPHILS NFR BLD AUTO: 0 % — SIGNIFICANT CHANGE UP (ref 0–2)
EOSINOPHIL # BLD AUTO: 0.89 K/UL — HIGH (ref 0–0.5)
EOSINOPHIL NFR BLD AUTO: 10 % — HIGH (ref 0–6)
HCT VFR BLD CALC: 27.5 % — LOW (ref 39–50)
HGB BLD-MCNC: 9.4 G/DL — LOW (ref 13–17)
LYMPHOCYTES # BLD AUTO: 3.22 K/UL — SIGNIFICANT CHANGE UP (ref 1–3.3)
LYMPHOCYTES # BLD AUTO: 36 % — SIGNIFICANT CHANGE UP (ref 13–44)
MCHC RBC-ENTMCNC: 30.5 PG — SIGNIFICANT CHANGE UP (ref 27–34)
MCHC RBC-ENTMCNC: 34.2 GM/DL — SIGNIFICANT CHANGE UP (ref 32–36)
MCV RBC AUTO: 89.3 FL — SIGNIFICANT CHANGE UP (ref 80–100)
METAMYELOCYTES # FLD: 1 % — HIGH (ref 0–0)
MONOCYTES # BLD AUTO: 1.52 K/UL — HIGH (ref 0–0.9)
MONOCYTES NFR BLD AUTO: 17 % — HIGH (ref 2–14)
MYELOCYTES NFR BLD: 1 % — HIGH (ref 0–0)
NEUTROPHILS # BLD AUTO: 3.13 K/UL — SIGNIFICANT CHANGE UP (ref 1.8–7.4)
NEUTROPHILS NFR BLD AUTO: 34 % — LOW (ref 43–77)
NEUTS BAND # BLD: 1 % — SIGNIFICANT CHANGE UP (ref 0–8)
NRBC # BLD: 0 /100 — SIGNIFICANT CHANGE UP (ref 0–0)
NRBC # BLD: SIGNIFICANT CHANGE UP /100 WBCS (ref 0–0)
PLAT MORPH BLD: NORMAL — SIGNIFICANT CHANGE UP
PLATELET # BLD AUTO: 413 K/UL — HIGH (ref 150–400)
POIKILOCYTOSIS BLD QL AUTO: SLIGHT — SIGNIFICANT CHANGE UP
RBC # BLD: 3.08 M/UL — LOW (ref 4.2–5.8)
RBC # FLD: 20.9 % — HIGH (ref 10.3–14.5)
RBC BLD AUTO: ABNORMAL
TARGETS BLD QL SMEAR: SLIGHT — SIGNIFICANT CHANGE UP
WBC # BLD: 8.94 K/UL — SIGNIFICANT CHANGE UP (ref 3.8–10.5)
WBC # FLD AUTO: 8.94 K/UL — SIGNIFICANT CHANGE UP (ref 3.8–10.5)

## 2020-08-28 PROCEDURE — 99214 OFFICE O/P EST MOD 30 MIN: CPT | Mod: 95

## 2020-08-28 NOTE — PHYSICAL EXAM
[Restricted in physically strenuous activity but ambulatory and able to carry out work of a light or sedentary nature] : Status 1- Restricted in physically strenuous activity but ambulatory and able to carry out work of a light or sedentary nature, e.g., light house work, office work [de-identified] : Cannot perform physical exam due to nature of telemedicine visit, however on telemedicine patient appears to not be in any acute distress

## 2020-08-28 NOTE — ASSESSMENT
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 Raphael. \par \par -Given patient's complicated surgical course from December 2019-January 2020, and complete immobility at that time, patient's saddle PE is considered a provoked thrombosis. Therefore, full hypercoagulable workup was not indicated. Patient had multiple surgeries as well as tacrolimus induced TMA, a known hypercoagulable state. \par -Patient had IVC placed at that time, which according to the patient has not been retrieved at this point. Patient with bilateral lower extremity dopplers which showed no residual thrombosis last month. \par -Elevated D-dimer last month. Will check CTPA to rule out persistent PE, keep off A/C until then. Perhaps represents IVC filter thrombosis. Will need to have the retrieved, will discuss with PCP. \par -Patient is much improved from his hospitalization, with a good performance status at this point. No anticoagulation indicated now, especially considering multiple hemorrhagic complications, unless new thrombosis found. If IVC filter unable to be removed, will need to reconsider.  \par -Anemia likely due to CKD. \par -Follow up in one month.

## 2020-08-28 NOTE — HISTORY OF PRESENT ILLNESS
[Medical Office: ([facility])___] : at the medical office located in  [Home] : at home, [unfilled] , at the time of the visit. [Verbal consent obtained from patient] : the patient, [unfilled] [de-identified] : Patient is a 49 y/o M with multiple comorbidities, previously with ESRD on HD ( via LUE AVF), NET of pancreas (s/p robotic distal panc/splenectomy at [place] 2015 by Dr. Young, and now followed by Dr. Raphael), who presented to [facility] on 12/27/19 from dialysis for a fever s/p DDRT on 12/8/19 (ureteral stent sutured to Kennedy - removed 12/15). His post-op course was c/b delayed graft function requiring HD, and evidence of thrombotic microangiopathy (thrombocytopenia, schistocytes on peripheral smear, high LDH, low haptoglobin), likely due to tacrolimus. Tacrolimus held, received Belatacept 12/13 and started on PLEX (12/12, 12/15). Underwent renal bx on 12/13 c/w profound ATN due to TMA. Improved off tacrolimus and he was discharged home on 12/20/19 with outpatient HD. \par \par Patient was then sent back to [facility] ED on 12/17/19 by dialysis center after he was found to be febrile to 102. He was found to be flu + and had a positive urine culture with Enterobacter. ID was consulted and pt was started on Tamiflu and Meropenem. Transplant team and Nephrology was following patient for HD. Pt was taken to the OR on 1/1 for evacuation of perinephretic hematoma which was infected with enterobacter and repeat biopsy of transplanted kidney. Biopsy revealed ATN. Pt was recommended to continue on Meropenam as per ID. Pt also had a CT guided pelvic fluid aspiration by IR on 1/6. Post procedure pt had significant hematuria with clots for which urology was consulted. Pt placed on CBI and hematuria resolved. Pt had multiple antibiotics and antifungals added as per ID recommendations to due to rising WBC. Pt was taken back to the OR on 1/10 for evacuation of RP hematoma removal of external iliac artery stent, bovine pericardial patch repair of ext iliac artery, and ligation of external iliac vein. Pt was emergently taken to the OR after IR for explantation of transplanted kidney and repair of bladder perforation by transplant surgery. Vascular surgery was then called intra-operatively and repaired the right external iliac artery was repaired with a bovine pericardial patch, repaired the right external iliac vein, performed a thrombectomy of the right lower extremity veins and had an IVC filter placed. He had a renal US and IR guided drain and pigtail placed on 1/15.\par \par His course was notable on 1/8 for RLE DVT involving ext iliac, common fem veins, s/p thromobectomy and IVC filter placement complicated by cardiac arrest on 1/13 due to massive acute saddle pulmonary embolism with acute cor pulmonale and acute DVT of the brachial vein in RUE. Pt was started on Eliquis at that time. His course was complicated on 2/1 with AVF infiltrated during HD with aterial extravasation/bleeding to the left upper arm and chest. On 2/3, he had an arteriography, which demonstrated a tiny pseudoaneurysm arising from a distal branch of the left deep brachial artery and so permacath was placed on 2/11. He was also noted to have newly diagnosed afib and cardiology was following for medication management. Platelet count uptrended throughout hospitalization. \par \par Patient medically stabilized and transferred to [facility] 2/14/20 for IRF services. While in rehab, patient made functional progress in therapies and was eventually stable for discharge home.  [de-identified] : Patient has since been hospitalized at [facility] for an infected indwelling dialysis catheter. He had GNR bacteremia which resolved with removal of catheter and IV antibiotics. He is now s/p AVF, awaiting for it to mature. No endocarditis. He now feels much improved and has new catheter and getting dialysis three times weekly. He reports no chest pain or palpitations, no shortness of breath, no bleeding. He has been off anticoagulation.

## 2020-09-11 ENCOUNTER — APPOINTMENT (OUTPATIENT)
Dept: CT IMAGING | Facility: CLINIC | Age: 50
End: 2020-09-11
Payer: MEDICARE

## 2020-09-11 ENCOUNTER — OUTPATIENT (OUTPATIENT)
Dept: OUTPATIENT SERVICES | Facility: HOSPITAL | Age: 50
LOS: 1 days | End: 2020-09-11
Payer: COMMERCIAL

## 2020-09-11 DIAGNOSIS — N18.6 END STAGE RENAL DISEASE: Chronic | ICD-10-CM

## 2020-09-11 DIAGNOSIS — Z00.8 ENCOUNTER FOR OTHER GENERAL EXAMINATION: ICD-10-CM

## 2020-09-11 DIAGNOSIS — Z94.0 KIDNEY TRANSPLANT STATUS: Chronic | ICD-10-CM

## 2020-09-11 DIAGNOSIS — L02.31 CUTANEOUS ABSCESS OF BUTTOCK: Chronic | ICD-10-CM

## 2020-09-11 DIAGNOSIS — I26.02 SADDLE EMBOLUS OF PULMONARY ARTERY WITH ACUTE COR PULMONALE: ICD-10-CM

## 2020-09-11 DIAGNOSIS — Z98.89 OTHER SPECIFIED POSTPROCEDURAL STATES: Chronic | ICD-10-CM

## 2020-09-11 DIAGNOSIS — T83.410S BREAKDOWN (MECHANICAL) OF IMPLANTED PENILE PROSTHESIS, SEQUELA: Chronic | ICD-10-CM

## 2020-09-11 DIAGNOSIS — I77.0 ARTERIOVENOUS FISTULA, ACQUIRED: Chronic | ICD-10-CM

## 2020-09-11 PROCEDURE — 71275 CT ANGIOGRAPHY CHEST: CPT

## 2020-09-11 PROCEDURE — 71275 CT ANGIOGRAPHY CHEST: CPT | Mod: 26

## 2020-09-14 ENCOUNTER — APPOINTMENT (OUTPATIENT)
Dept: INTERNAL MEDICINE | Facility: CLINIC | Age: 50
End: 2020-09-14

## 2020-09-21 ENCOUNTER — OUTPATIENT (OUTPATIENT)
Dept: OUTPATIENT SERVICES | Facility: HOSPITAL | Age: 50
LOS: 1 days | Discharge: HOME | End: 2020-09-21

## 2020-09-21 DIAGNOSIS — Z98.89 OTHER SPECIFIED POSTPROCEDURAL STATES: Chronic | ICD-10-CM

## 2020-09-21 DIAGNOSIS — I77.0 ARTERIOVENOUS FISTULA, ACQUIRED: Chronic | ICD-10-CM

## 2020-09-21 DIAGNOSIS — C25.9 MALIGNANT NEOPLASM OF PANCREAS, UNSPECIFIED: ICD-10-CM

## 2020-09-21 DIAGNOSIS — L02.31 CUTANEOUS ABSCESS OF BUTTOCK: Chronic | ICD-10-CM

## 2020-09-21 DIAGNOSIS — T83.410S BREAKDOWN (MECHANICAL) OF IMPLANTED PENILE PROSTHESIS, SEQUELA: Chronic | ICD-10-CM

## 2020-09-21 DIAGNOSIS — Z94.0 KIDNEY TRANSPLANT STATUS: Chronic | ICD-10-CM

## 2020-09-21 DIAGNOSIS — N18.6 END STAGE RENAL DISEASE: Chronic | ICD-10-CM

## 2020-09-25 ENCOUNTER — APPOINTMENT (OUTPATIENT)
Dept: HEMATOLOGY ONCOLOGY | Facility: CLINIC | Age: 50
End: 2020-09-25

## 2020-10-07 ENCOUNTER — APPOINTMENT (OUTPATIENT)
Dept: INTERNAL MEDICINE | Facility: CLINIC | Age: 50
End: 2020-10-07

## 2020-11-11 NOTE — PRE-ANESTHESIA EVALUATION ADULT - TEMPERATURE IN CELSIUS (DEGREES C)
Detail Level: Zone
Quality 337: Tuberculosis Prevention For Psoriasis And Psoriatic Arthritis Patients On A Biological Immune Response Modifier: Patient has a documented negative annual TB screening.
36.9
36.3
36.7
37.6

## 2020-12-07 ENCOUNTER — APPOINTMENT (OUTPATIENT)
Dept: INTERNAL MEDICINE | Facility: CLINIC | Age: 50
End: 2020-12-07
Payer: MEDICARE

## 2020-12-07 VITALS
WEIGHT: 145 LBS | TEMPERATURE: 97.2 F | OXYGEN SATURATION: 96 % | SYSTOLIC BLOOD PRESSURE: 153 MMHG | RESPIRATION RATE: 17 BRPM | HEART RATE: 65 BPM | BODY MASS INDEX: 20.76 KG/M2 | DIASTOLIC BLOOD PRESSURE: 90 MMHG | HEIGHT: 70 IN

## 2020-12-07 DIAGNOSIS — D3A.8 OTHER BENIGN NEUROENDOCRINE TUMORS: ICD-10-CM

## 2020-12-07 DIAGNOSIS — R10.84 GENERALIZED ABDOMINAL PAIN: ICD-10-CM

## 2020-12-07 DIAGNOSIS — I26.99 OTHER PULMONARY EMBOLISM W/OUT ACUTE COR PULMONALE: ICD-10-CM

## 2020-12-07 PROCEDURE — 99396 PREV VISIT EST AGE 40-64: CPT | Mod: 25

## 2020-12-07 PROCEDURE — 99072 ADDL SUPL MATRL&STAF TM PHE: CPT

## 2020-12-07 PROCEDURE — 99214 OFFICE O/P EST MOD 30 MIN: CPT | Mod: 25

## 2020-12-08 ENCOUNTER — APPOINTMENT (OUTPATIENT)
Dept: INTERNAL MEDICINE | Facility: CLINIC | Age: 50
End: 2020-12-08
Payer: MEDICARE

## 2020-12-08 DIAGNOSIS — R10.9 UNSPECIFIED ABDOMINAL PAIN: ICD-10-CM

## 2020-12-08 PROCEDURE — 99442: CPT

## 2020-12-09 PROBLEM — R10.9 ABDOMINAL CRAMPING: Status: ACTIVE | Noted: 2020-12-09

## 2020-12-10 LAB
ALBUMIN SERPL ELPH-MCNC: 4.7 G/DL
ALP BLD-CCNC: 228 U/L
ALT SERPL-CCNC: 53 U/L
ANION GAP SERPL CALC-SCNC: 20 MMOL/L
AST SERPL-CCNC: 125 U/L
BILIRUB SERPL-MCNC: 0.6 MG/DL
BUN SERPL-MCNC: 42 MG/DL
CALCIUM SERPL-MCNC: 10.5 MG/DL
CHLORIDE SERPL-SCNC: 93 MMOL/L
CHOLEST SERPL-MCNC: 198 MG/DL
CO2 SERPL-SCNC: 24 MMOL/L
CREAT SERPL-MCNC: 10.69 MG/DL
ESTIMATED AVERAGE GLUCOSE: 114 MG/DL
GGT SERPL-CCNC: 437 U/L
GLUCOSE SERPL-MCNC: 70 MG/DL
HBA1C MFR BLD HPLC: 5.6 %
HDLC SERPL-MCNC: 124 MG/DL
LDLC SERPL CALC-MCNC: 60 MG/DL
NONHDLC SERPL-MCNC: 74 MG/DL
POTASSIUM SERPL-SCNC: 5.8 MMOL/L
PROT SERPL-MCNC: 7.5 G/DL
SARS-COV-2 IGG SERPL IA-ACNC: <0.1 INDEX
SARS-COV-2 IGG SERPL QL IA: NEGATIVE
SODIUM SERPL-SCNC: 137 MMOL/L
TRIGL SERPL-MCNC: 73 MG/DL
TSH SERPL-ACNC: 2.48 UIU/ML
URATE SERPL-MCNC: 5 MG/DL

## 2020-12-21 ENCOUNTER — RX RENEWAL (OUTPATIENT)
Age: 50
End: 2020-12-21

## 2021-01-16 ENCOUNTER — RX RENEWAL (OUTPATIENT)
Age: 51
End: 2021-01-16

## 2021-01-26 NOTE — ED ADULT TRIAGE NOTE - BANDS:
Allergy; Do Not Use Extremity; Graft Donor Site Bandage (Optional-Leave Blank If You Don't Want In Note): Steri-strips and a pressure bandage were applied to the donor site.

## 2021-01-29 ENCOUNTER — APPOINTMENT (OUTPATIENT)
Dept: ULTRASOUND IMAGING | Facility: CLINIC | Age: 51
End: 2021-01-29
Payer: MEDICARE

## 2021-01-29 ENCOUNTER — OUTPATIENT (OUTPATIENT)
Dept: OUTPATIENT SERVICES | Facility: HOSPITAL | Age: 51
LOS: 1 days | End: 2021-01-29
Payer: COMMERCIAL

## 2021-01-29 DIAGNOSIS — Z94.0 KIDNEY TRANSPLANT STATUS: Chronic | ICD-10-CM

## 2021-01-29 DIAGNOSIS — I77.0 ARTERIOVENOUS FISTULA, ACQUIRED: Chronic | ICD-10-CM

## 2021-01-29 DIAGNOSIS — L02.31 CUTANEOUS ABSCESS OF BUTTOCK: Chronic | ICD-10-CM

## 2021-01-29 DIAGNOSIS — Z00.8 ENCOUNTER FOR OTHER GENERAL EXAMINATION: ICD-10-CM

## 2021-01-29 DIAGNOSIS — R10.84 GENERALIZED ABDOMINAL PAIN: ICD-10-CM

## 2021-01-29 DIAGNOSIS — N18.6 END STAGE RENAL DISEASE: Chronic | ICD-10-CM

## 2021-01-29 DIAGNOSIS — T83.410S BREAKDOWN (MECHANICAL) OF IMPLANTED PENILE PROSTHESIS, SEQUELA: Chronic | ICD-10-CM

## 2021-01-29 DIAGNOSIS — Z98.89 OTHER SPECIFIED POSTPROCEDURAL STATES: Chronic | ICD-10-CM

## 2021-01-29 PROCEDURE — 76700 US EXAM ABDOM COMPLETE: CPT

## 2021-01-29 PROCEDURE — 76700 US EXAM ABDOM COMPLETE: CPT | Mod: 26

## 2021-03-25 NOTE — ED PROVIDER NOTE - OBJECTIVE STATEMENT
Patient 50 y/o male with hx renal transplant on 12/8 presenting to the ED with fever. Patient was d/c 12/20 and has been receiving dialysis T,TH,S however was supposed to have an extra session today. Noted to be febrile to 101 before dialysis and sent to the ED. Reports he was around his mother who was dx with flu one week ago and hospitalized. He denies any symptoms including cough, runny nose, sore throat, body aches, abdominal pain, n/v/d. States he has been making urine about 3 episodes per day. Has been taking all his transplant meds as prescribed.

## 2021-03-29 NOTE — PROGRESS NOTE ADULT - RESPIRATORY
detailed exam
detailed exam
I have personally provided the amount of critical care time documented below concurrently with the resident/fellow.  This time excludes time spent on separate procedures and time spent teaching. I have reviewed the resident’s / fellow’s documentation and I agree with the history, exam, and assessment and plan of care.

## 2021-05-19 NOTE — OCCUPATIONAL THERAPY INITIAL EVALUATION ADULT - LEVEL OF INDEPENDENCE: CHAIR TO BED, REHAB EVAL
Asc Procedure Text (A): After obtaining clear surgical margins the patient was sent to an ASC for surgical repair.  The patient understands they will receive post-surgical care and follow-up from the ASC physician. unable to perform/8/10 groin pain minimum assist (75% patients effort)/moderate assist (50% patients effort)

## 2021-05-24 ENCOUNTER — APPOINTMENT (OUTPATIENT)
Dept: CARDIOLOGY | Facility: CLINIC | Age: 51
End: 2021-05-24

## 2021-06-21 ENCOUNTER — NON-APPOINTMENT (OUTPATIENT)
Age: 51
End: 2021-06-21

## 2021-08-03 ENCOUNTER — NON-APPOINTMENT (OUTPATIENT)
Age: 51
End: 2021-08-03

## 2021-08-11 ENCOUNTER — APPOINTMENT (OUTPATIENT)
Dept: INTERNAL MEDICINE | Facility: CLINIC | Age: 51
End: 2021-08-11
Payer: MEDICARE

## 2021-08-11 VITALS
BODY MASS INDEX: 22.19 KG/M2 | DIASTOLIC BLOOD PRESSURE: 83 MMHG | HEIGHT: 70 IN | RESPIRATION RATE: 17 BRPM | SYSTOLIC BLOOD PRESSURE: 152 MMHG | HEART RATE: 95 BPM | OXYGEN SATURATION: 95 % | TEMPERATURE: 98.5 F | WEIGHT: 155 LBS

## 2021-08-11 PROCEDURE — 99495 TRANSJ CARE MGMT MOD F2F 14D: CPT

## 2021-11-19 ENCOUNTER — APPOINTMENT (OUTPATIENT)
Dept: INTERNAL MEDICINE | Facility: CLINIC | Age: 51
End: 2021-11-19
Payer: MEDICARE

## 2021-11-19 ENCOUNTER — APPOINTMENT (OUTPATIENT)
Dept: INTERNAL MEDICINE | Facility: CLINIC | Age: 51
End: 2021-11-19

## 2021-11-19 DIAGNOSIS — S46.912A STRAIN OF UNSPECIFIED MUSCLE, FASCIA AND TENDON AT SHOULDER AND UPPER ARM LEVEL, LEFT ARM, INITIAL ENCOUNTER: ICD-10-CM

## 2021-11-19 PROCEDURE — 99441: CPT

## 2021-11-22 PROBLEM — S46.912A LEFT SHOULDER STRAIN, INITIAL ENCOUNTER: Status: ACTIVE | Noted: 2019-03-18

## 2021-12-08 ENCOUNTER — NON-APPOINTMENT (OUTPATIENT)
Age: 51
End: 2021-12-08

## 2021-12-08 ENCOUNTER — APPOINTMENT (OUTPATIENT)
Dept: INTERNAL MEDICINE | Facility: CLINIC | Age: 51
End: 2021-12-08
Payer: MEDICARE

## 2021-12-08 VITALS
OXYGEN SATURATION: 96 % | SYSTOLIC BLOOD PRESSURE: 162 MMHG | WEIGHT: 147 LBS | RESPIRATION RATE: 17 BRPM | DIASTOLIC BLOOD PRESSURE: 91 MMHG | TEMPERATURE: 98 F | BODY MASS INDEX: 21.05 KG/M2 | HEIGHT: 70 IN | HEART RATE: 65 BPM

## 2021-12-08 DIAGNOSIS — Z01.818 ENCOUNTER FOR OTHER PREPROCEDURAL EXAMINATION: ICD-10-CM

## 2021-12-08 PROCEDURE — 99396 PREV VISIT EST AGE 40-64: CPT | Mod: 25

## 2021-12-08 PROCEDURE — 93000 ELECTROCARDIOGRAM COMPLETE: CPT

## 2021-12-15 LAB
ALBUMIN SERPL ELPH-MCNC: 4.3 G/DL
ALP BLD-CCNC: 238 U/L
ALT SERPL-CCNC: 16 U/L
ANION GAP SERPL CALC-SCNC: 13 MMOL/L
AST SERPL-CCNC: 36 U/L
BASOPHILS # BLD AUTO: 0.08 K/UL
BASOPHILS NFR BLD AUTO: 0.9 %
BILIRUB SERPL-MCNC: 2.2 MG/DL
BUN SERPL-MCNC: 17 MG/DL
CALCIUM SERPL-MCNC: 8.7 MG/DL
CHLORIDE SERPL-SCNC: 91 MMOL/L
CHOLEST SERPL-MCNC: 149 MG/DL
CO2 SERPL-SCNC: 31 MMOL/L
CREAT SERPL-MCNC: 6.05 MG/DL
EOSINOPHIL # BLD AUTO: 0.17 K/UL
EOSINOPHIL NFR BLD AUTO: 1.8 %
ESTIMATED AVERAGE GLUCOSE: 91 MG/DL
GLUCOSE SERPL-MCNC: 80 MG/DL
HBA1C MFR BLD HPLC: 4.8 %
HCT VFR BLD CALC: 34.3 %
HDLC SERPL-MCNC: 117 MG/DL
HGB BLD-MCNC: 11.9 G/DL
IMM GRANULOCYTES NFR BLD AUTO: 0.5 %
LDLC SERPL CALC-MCNC: 19 MG/DL
LYMPHOCYTES # BLD AUTO: 1.66 K/UL
LYMPHOCYTES NFR BLD AUTO: 17.9 %
MAN DIFF?: NORMAL
MCHC RBC-ENTMCNC: 34.2 PG
MCHC RBC-ENTMCNC: 34.7 GM/DL
MCV RBC AUTO: 98.6 FL
MONOCYTES # BLD AUTO: 1.31 K/UL
MONOCYTES NFR BLD AUTO: 14.1 %
NEUTROPHILS # BLD AUTO: 6.01 K/UL
NEUTROPHILS NFR BLD AUTO: 64.8 %
NONHDLC SERPL-MCNC: 32 MG/DL
PLATELET # BLD AUTO: 151 K/UL
POTASSIUM SERPL-SCNC: 3.8 MMOL/L
PROT SERPL-MCNC: 7.6 G/DL
PSA FREE FLD-MCNC: 23 %
PSA FREE SERPL-MCNC: 0.33 NG/ML
PSA SERPL-MCNC: 1.46 NG/ML
RBC # BLD: 3.48 M/UL
RBC # FLD: 18.4 %
SODIUM SERPL-SCNC: 136 MMOL/L
TRIGL SERPL-MCNC: 64 MG/DL
TSH SERPL-ACNC: 3.06 UIU/ML
WBC # FLD AUTO: 9.28 K/UL

## 2021-12-21 NOTE — DISCHARGE NOTE PROVIDER - [id number]_ALL_CORE
Med: buspirone  Dosage: 15mg  Sig:    Quantity requested:  180    fax  Mail Order: no    Preferred pharmacy has been set up and verified.   
PCP: Rosana Lamas, DO  Medication: busPIRone (BUSPAR) 15 MG tablet  Last office visit: 11/11/2021  Per OV note  \"Anxiety:  I recommended that we increase the dose of her sertraline to 200 mg and add BuSpar 15 mg b.i.d..  Will follow-up in 1 month.\"    Next visit scheduled: 12/29/21    Refill sent per protocol.    
This document is complete and the patient is ready for discharge.

## 2022-03-10 NOTE — PROGRESS NOTE ADULT - ATTENDING COMMENTS
Dr. Philippe (Attending Physician)  N - Minimal pain only with movement  P - Saddle PE but doing well with oxygenation, new pulm infarcts on CTA yesterday, improved pulm vasc congestion  C - Hypotensive yesterday after HD  GI - NPO for IR   - ESRD received HD yesterday, Fluid collection at site of explanted kidney, will place new drain  H - on Heparin gtt for PEs therapeutic, DVTs, Hb 7.6 slight downtrend restart EPO  ID - enterobacter and e. coli from drain, cw meropenem and micafungin, blood cx sent, will send cx from drain today  E - present

## 2022-04-04 ENCOUNTER — RX RENEWAL (OUTPATIENT)
Age: 52
End: 2022-04-04

## 2022-04-18 ENCOUNTER — APPOINTMENT (OUTPATIENT)
Dept: CARDIOLOGY | Facility: CLINIC | Age: 52
End: 2022-04-18
Payer: MEDICARE

## 2022-04-18 ENCOUNTER — NON-APPOINTMENT (OUTPATIENT)
Age: 52
End: 2022-04-18

## 2022-04-18 VITALS — SYSTOLIC BLOOD PRESSURE: 142 MMHG | DIASTOLIC BLOOD PRESSURE: 90 MMHG

## 2022-04-18 VITALS
WEIGHT: 143.3 LBS | BODY MASS INDEX: 20.52 KG/M2 | SYSTOLIC BLOOD PRESSURE: 169 MMHG | HEART RATE: 60 BPM | HEIGHT: 70 IN | OXYGEN SATURATION: 94 % | DIASTOLIC BLOOD PRESSURE: 100 MMHG

## 2022-04-18 DIAGNOSIS — R06.00 DYSPNEA, UNSPECIFIED: ICD-10-CM

## 2022-04-18 PROCEDURE — 93000 ELECTROCARDIOGRAM COMPLETE: CPT

## 2022-04-18 PROCEDURE — 99214 OFFICE O/P EST MOD 30 MIN: CPT

## 2022-04-18 RX ORDER — METOPROLOL TARTRATE 25 MG/1
25 TABLET, FILM COATED ORAL
Qty: 180 | Refills: 3 | Status: DISCONTINUED | COMMUNITY
Start: 2019-12-18 | End: 2022-04-18

## 2022-04-18 NOTE — HISTORY OF PRESENT ILLNESS
[FreeTextEntry1] : 49-year-old man with a history of end-stage renal disease on dialysis on the renal transplant list, rheumatoid arthritis,hypertension.\par \par he presented to  in 12/2020 with Flu.  He is s/p washout of infected collection and biopsy which revealed ATN.  He was found to have diffusely enlarging perinephric fluid collection.\par While on telemetry, he had an episode of a wide complex tachycardia. It initially appears irregular, suggesting an atrial arrhythmia with aberrancy, though the remainder appears more like an episode of non-sustained VT. s /p washout on 1/1/2020 with worsening sepsis and transfer to ICU, s/p IR drainage of perinephric collection on 1/6. 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 He underwent operative repair of right external iliac artery with pericardial patch, transplant nephrectomy, RLE thrombectomy, and IVC filter on 1/9. \par Then he had a s/p cardiac arrest likely from PE s/p TPA with  course complicated by PAFw rvr, and placed on amio,  \par He then returned to the SICU with significant lue swelling and active arterial extravasation, with drop in blood counts. He was then discharged to rehab once optimized. \par He had an echo in 2/2020 showing low normal LV function but severe PHTN. \par  \par He is now here for a hospital followup. he having a AVF put at [facility] with Dr. Rivas on 7/21/20\par He stopped taken his eliquis for about 2 weeks. he noted that he had more hematoma at home. \par  \par \par Since his last visit, he had a mechanical fall in 6/2021 resulting in femur fracture s/p IMN. Then he went to [place] for rehab. He is feeling relatively well. He is going to culinary school. \par Hs dyspnea on exertion has worsened mildly. He has been more sedentary given his arthritis issues.  He denies any PND, orthopnea, lower extremity edema, near syncope, syncope, stroke like symptoms, palpitations.\par He is tolerating HD. He is tolerating volume removal without any significant drops in BP. he notes that his BP has been stable at home (110-150)\par He is compliant with his medications. \par  HIs dry weight is 63kg.\par

## 2022-04-18 NOTE — DISCUSSION/SUMMARY
[FreeTextEntry1] : 49 year man with a history as listed presents for a followup cardiac evaluation. \par Sai is complaining of more GRAY likely from deconditioning. He will undergo a pharmacological nuclear stress test to assess for underlying obstructive CAD. He will get a 2d echo to assess for any  new structural heart disease, changes in valvular and ventricular function. \par He currently is maintaining SR.  \par His anticoagulation for PE and Af was never restarted. He will get a Zio to assess for occult AF\par Change his short acting metoprolol to Toprol 75mg Qday. He will use Hydralazine PRn to assess if needs more BP meds. \par Volume removal via HD. He will followup with renal. \par Exercise and diet counseling was performed in order to reduce her future cardiovascular risk. \par He will followup with me in 2-3  month or sooner if necessary. \par  \par

## 2022-04-18 NOTE — CARDIOLOGY SUMMARY
[de-identified] : SR IRBBB nonspecific ST changes [de-identified] : 7/2020 pharm nuc neg SPECT  [de-identified] : 7/10/20 normal LV function and normal RV funciton.

## 2022-04-18 NOTE — PHYSICAL EXAM
[Well Groomed] : well groomed [General Appearance - In No Acute Distress] : no acute distress [Normal Conjunctiva] : the conjunctiva exhibited no abnormalities [Eyelids - No Xanthelasma] : the eyelids demonstrated no xanthelasmas [Normal Oral Mucosa] : normal oral mucosa [No Oral Pallor] : no oral pallor [No Oral Cyanosis] : no oral cyanosis [FreeTextEntry1] : no jvd [Respiration, Rhythm And Depth] : normal respiratory rhythm and effort [Exaggerated Use Of Accessory Muscles For Inspiration] : no accessory muscle use [Auscultation Breath Sounds / Voice Sounds] : lungs were clear to auscultation bilaterally [Abdomen Soft] : soft [Abdomen Tenderness] : non-tender [Abdomen Mass (___ Cm)] : no abdominal mass palpated [Abnormal Walk] : normal gait [Petechial Hemorrhages (___cm)] : no petechial hemorrhages [Skin Color & Pigmentation] : normal skin color and pigmentation [] : no rash [Oriented To Time, Place, And Person] : oriented to person, place, and time [Affect] : the affect was normal [Mood] : the mood was normal [No Anxiety] : not feeling anxious [Normal Rate] : normal [Rhythm Regular] : regular [Normal S1] : normal S1 [Normal S2] : normal S2 [No Gallop] : no gallop heard [Right Carotid Bruit] : no bruit heard over the right carotid [Left Carotid Bruit] : no bruit heard over the left carotid [2+] : left 2+ [Bruit] : no bruit heard [No Pitting Edema] : no pitting edema present

## 2022-04-22 NOTE — PATIENT PROFILE ADULT - NSTOBACCO QUIT READY_GEN_A_CORE_SD
Caller: SAPNA GRAHAM    Relationship to patient: WIFE    Best call back number: [phone]    Chief complaint: BILATERAL KNEE PAIN    Type of visit: FOLLOW UP/INJECTION    Requested date: AS SOON AS CAN BE SCHEDULED - EARLIEST IN THE AM IF POSSIBLE    If rescheduling, when is the original appointment:  NA    
ready to quit

## 2022-04-25 ENCOUNTER — APPOINTMENT (OUTPATIENT)
Dept: CARDIOLOGY | Facility: CLINIC | Age: 52
End: 2022-04-25

## 2022-04-29 ENCOUNTER — NON-APPOINTMENT (OUTPATIENT)
Age: 52
End: 2022-04-29

## 2022-05-02 ENCOUNTER — APPOINTMENT (OUTPATIENT)
Dept: CARDIOLOGY | Facility: CLINIC | Age: 52
End: 2022-05-02

## 2022-05-25 ENCOUNTER — NON-APPOINTMENT (OUTPATIENT)
Age: 52
End: 2022-05-25

## 2022-05-25 ENCOUNTER — APPOINTMENT (OUTPATIENT)
Dept: CARDIOLOGY | Facility: CLINIC | Age: 52
End: 2022-05-25
Payer: MEDICARE

## 2022-05-25 VITALS
SYSTOLIC BLOOD PRESSURE: 161 MMHG | WEIGHT: 142 LBS | HEART RATE: 81 BPM | DIASTOLIC BLOOD PRESSURE: 82 MMHG | OXYGEN SATURATION: 97 % | HEIGHT: 70 IN | BODY MASS INDEX: 20.33 KG/M2

## 2022-05-25 VITALS — DIASTOLIC BLOOD PRESSURE: 70 MMHG | SYSTOLIC BLOOD PRESSURE: 124 MMHG

## 2022-05-25 PROCEDURE — 99214 OFFICE O/P EST MOD 30 MIN: CPT

## 2022-05-25 PROCEDURE — 93000 ELECTROCARDIOGRAM COMPLETE: CPT

## 2022-05-25 NOTE — DISCUSSION/SUMMARY
[FreeTextEntry1] : 51 year man with a history as listed presents for a followup cardiac evaluation. \par Sai recently had an admission to [facility] where he had a stress and echo performed. I will call them to get those records. I personally reviewed all of the hospital records available to me at this time, which included but are not limited to the discharge summary, labs and imaging reports. His EKG did not reveal any significant ischemic changes. I performed a full med rec. He is only on Toprol 75mg Qday and Hydralazine PRN SBP >160\par He currently is maintaining SR.  His anticoagulation for PE and Af was never restarted. He will get a Zio to assess for occult AF\par  Volume removal via HD. He will followup with renal. \par Exercise and diet counseling was performed in order to reduce her future cardiovascular risk. \par He will followup with me in 3  month or sooner if necessary. \par  \par

## 2022-05-25 NOTE — CARDIOLOGY SUMMARY
[de-identified] : SR IRBBB nonspecific ST changes [de-identified] : 7/2020 pharm nuc neg SPECT  [de-identified] : 7/10/20 normal LV function and normal RV funciton.

## 2022-05-25 NOTE — REASON FOR VISIT
[FreeTextEntry1] : 49-year-old man with a history of end-stage renal disease on dialysis on the renal transplant list, rheumatoid arthritis,hypertension.\par \par he presented to  in 12/2020 with Flu.  He is s/p washout of infected collection and biopsy which revealed ATN.  He was found to have diffusely enlarging perinephric fluid collection.\par While on telemetry, he had an episode of a wide complex tachycardia. It initially appears irregular, suggesting an atrial arrhythmia with aberrancy, though the remainder appears more like an episode of non-sustained VT. s /p washout on 1/1/2020 with worsening sepsis and transfer to ICU, s/p IR drainage of perinephric collection on 1/6. 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 He underwent operative repair of right external iliac artery with pericardial patch, transplant nephrectomy, RLE thrombectomy, and IVC filter on 1/9. \par Then he had a s/p cardiac arrest likely from PE s/p TPA with  course complicated by PAFw rvr, and placed on amio,  \par He then returned to the SICU with significant lue swelling and active arterial extravasation, with drop in blood counts. He was then discharged to rehab once optimized. \par He had an echo in 2/2020 showing low normal LV function but severe PHTN. \par  \par He is now here for a hospital followup. he having a AVF put at [facility] with Dr. Rivas on 7/21/20\par He stopped taken his eliquis for about 2 weeks. he noted that he had more hematoma at home. \par \par  he had a mechanical fall in 6/2021 resulting in femur fracture s/p IMN.

## 2022-05-25 NOTE — HISTORY OF PRESENT ILLNESS
[FreeTextEntry1] : Since his last visit, unfortunately his mother passed away suddenly. He started to drink a pint of liquir a day and missed 3 HD sessions. He started to have a chest pain that was pleuritic and felt like a gas bubble. He was taken to [facility]. he states that he improved once back on HD. He now got a stress test and echo but does not remember what the result says. \par \par Currently He   denies any chest pain, PND, orthopnea, lower extremity edema, near syncope, syncope, strokelike symptoms. He denies any dyspnea. he is compliant with his HD. His dry weight is 64.5. \par He is pending a GI followup

## 2022-08-04 ENCOUNTER — APPOINTMENT (OUTPATIENT)
Dept: INTERNAL MEDICINE | Facility: CLINIC | Age: 52
End: 2022-08-04

## 2022-08-04 VITALS
SYSTOLIC BLOOD PRESSURE: 120 MMHG | DIASTOLIC BLOOD PRESSURE: 74 MMHG | HEIGHT: 70 IN | OXYGEN SATURATION: 96 % | HEART RATE: 70 BPM | WEIGHT: 141 LBS | BODY MASS INDEX: 20.19 KG/M2 | TEMPERATURE: 97.3 F | RESPIRATION RATE: 17 BRPM

## 2022-08-04 DIAGNOSIS — I48.0 PAROXYSMAL ATRIAL FIBRILLATION: ICD-10-CM

## 2022-08-04 DIAGNOSIS — R00.2 PALPITATIONS: ICD-10-CM

## 2022-08-04 DIAGNOSIS — R53.81 OTHER MALAISE: ICD-10-CM

## 2022-08-04 DIAGNOSIS — R77.8 OTHER SPECIFIED ABNORMALITIES OF PLASMA PROTEINS: ICD-10-CM

## 2022-08-04 PROCEDURE — 99215 OFFICE O/P EST HI 40 MIN: CPT

## 2022-08-11 PROBLEM — R77.8 ELEVATED TROPONIN: Status: ACTIVE | Noted: 2020-07-07

## 2022-08-11 PROBLEM — R53.81 PHYSICAL DECONDITIONING: Status: ACTIVE | Noted: 2017-06-21

## 2022-08-11 PROBLEM — I48.0 PAF (PAROXYSMAL ATRIAL FIBRILLATION): Status: ACTIVE | Noted: 2020-07-07

## 2022-08-17 ENCOUNTER — APPOINTMENT (OUTPATIENT)
Dept: WOUND CARE | Facility: CLINIC | Age: 52
End: 2022-08-17
Payer: MEDICARE

## 2022-08-17 VITALS
BODY MASS INDEX: 20.76 KG/M2 | TEMPERATURE: 98 F | RESPIRATION RATE: 16 BRPM | OXYGEN SATURATION: 99 % | HEIGHT: 70 IN | SYSTOLIC BLOOD PRESSURE: 180 MMHG | DIASTOLIC BLOOD PRESSURE: 90 MMHG | HEART RATE: 69 BPM | WEIGHT: 145 LBS

## 2022-08-17 DIAGNOSIS — M79.674 PAIN IN RIGHT TOE(S): ICD-10-CM

## 2022-08-17 DIAGNOSIS — L60.2 ONYCHOGRYPHOSIS: ICD-10-CM

## 2022-08-17 DIAGNOSIS — M79.675 PAIN IN RIGHT TOE(S): ICD-10-CM

## 2022-08-17 PROCEDURE — 99203 OFFICE O/P NEW LOW 30 MIN: CPT | Mod: 1L,25

## 2022-08-17 PROCEDURE — 11721 DEBRIDE NAIL 6 OR MORE: CPT | Mod: 1L

## 2022-08-17 NOTE — HISTORY OF PRESENT ILLNESS
[FreeTextEntry1] : Patient is a 52 year old male with ESRD and on dialysis T/TH/S, HTn, hx of left femur fracture and bilateral knee osteoarthritis. Patient states he is unable to bend and reach his toes to trim his toenails. Patient's mother used to clip his toenails but she recently passes and the patient has been unable to take care of his feet since then. Patient also has a painful callus to the right foot. Patient states the nails are very long and cause him pain with waling and also when wearing shoes. Patient denies any numbness, tingling, burning sensation to the feet and also denies any calf pain at this time.

## 2022-08-17 NOTE — ASSESSMENT
[FreeTextEntry1] : Pt was seen and examined. The offending nail margins were mechanically and electrically debrided to the level of normal underlying nail bed with good relief obtained as evidenced by pain-free ambulation. The patient was instructed to attempt to maintain the length and thickness of their nails as best as possible. \par Discussed the treatment options with the patient and have debrided the lesions full thickness, recommended use of Vaseline or similar product to decrease friction, stretched shoes, instructed patient to use appropriate shoegear. After debridement of the callus down to skin and dermis.  Also advised patient to monitor for any signs of infection and visit the ED immediately. Patient to follow up in one week. Spent 30 minutes for patient care and medical decision making.\par \par

## 2022-08-17 NOTE — PHYSICAL EXAM
[General Appearance - Alert] : alert [General Appearance - In No Acute Distress] : in no acute distress [1+] : left foot posterior tibialis 1+ [2+] : left foot dorsalis pedis 2+ [Ankle Swelling (On Exam)] : not present [Varicose Veins Of Lower Extremities] : not present [] : not present [de-identified] : 5/5 muscle strength for all muscles and tendons crossing the foot and ankle joints, ankle joint and STJ ROM intact b/l. No pain with calf compression b/l. \par  [FreeTextEntry1] : Skin bilateral lower extremity is warm to touch with no open wounds or lesions, HPK noted to the medial aspect of the first metatarsal head right foot. Nails of toes 1-5 b/l are thickened, elongated and distally incurvated into the plantar skin and subungal debri, mild yellow discoloration. Digital nails 1 and 5 bilaterally are discolored and dystrophic. There is no surrounding cellulitis, deep incurvation, or evidence of bacterial infection\par Debrided HPK down o skin and dermis with no drainage, no fluctuance, no signs of infection.

## 2022-08-17 NOTE — REVIEW OF SYSTEMS
[Skin Lesions] : skin lesion [Fever] : no fever [Chills] : no chills [Eye Pain] : no eye pain [Earache] : no earache [Loss Of Hearing] : no hearing loss [Chest Pain] : no chest pain [Shortness Of Breath] : no shortness of breath [Cough] : no cough [Abdominal Pain] : no abdominal pain [Joint Swelling] : no joint swelling [Confused] : no confusion [Dizziness] : no dizziness [FreeTextEntry9] : sensitive toe nails  [de-identified] : callus to the right foot at the first metatarsal head

## 2022-08-24 ENCOUNTER — APPOINTMENT (OUTPATIENT)
Dept: WOUND CARE | Facility: CLINIC | Age: 52
End: 2022-08-24
Payer: MEDICARE

## 2022-08-24 VITALS
TEMPERATURE: 98.3 F | SYSTOLIC BLOOD PRESSURE: 157 MMHG | RESPIRATION RATE: 16 BRPM | HEART RATE: 96 BPM | DIASTOLIC BLOOD PRESSURE: 81 MMHG | OXYGEN SATURATION: 99 %

## 2022-08-24 DIAGNOSIS — B35.1 TINEA UNGUIUM: ICD-10-CM

## 2022-08-24 PROCEDURE — 99213 OFFICE O/P EST LOW 20 MIN: CPT | Mod: 1L

## 2022-08-24 NOTE — HISTORY OF PRESENT ILLNESS
[FreeTextEntry1] : Patient is a 52 year old male presenting for follow up of the right foot pain. Patient had painful callus to the right foot that was debrided at the last visit, about a week ago. Patient wanted to get the callus area checked to make sure there was no infection. Patient kept the dressing to the right foot intact. Denies any new complaints.

## 2022-08-24 NOTE — REVIEW OF SYSTEMS
[Skin Lesions] : skin lesion [Fever] : no fever [Chills] : no chills [Eye Pain] : no eye pain [Earache] : no earache [Loss Of Hearing] : no hearing loss [Chest Pain] : no chest pain [Shortness Of Breath] : no shortness of breath [Cough] : no cough [Abdominal Pain] : no abdominal pain [Joint Swelling] : no joint swelling [Confused] : no confusion [Dizziness] : no dizziness [FreeTextEntry9] : sensitive toe nails  [de-identified] : callus to the right foot at the first metatarsal head

## 2022-08-24 NOTE — ASSESSMENT
[FreeTextEntry1] : Pt was seen and examined. Discussed the treatment options with the patient and had debrided the lesions full thickness at the last visit, No signs of infection, recommended use of Vaseline or similar product to decrease friction, stretched shoes, instructed patient to use appropriate shoegear.   Also advised patient to monitor for any signs of infection and visit the ED immediately. Patient to follow up in one week. Spent 20 minutes for patient care and medical decision making.\par \par

## 2022-08-24 NOTE — PHYSICAL EXAM
[General Appearance - Alert] : alert [General Appearance - In No Acute Distress] : in no acute distress [1+] : left foot posterior tibialis 1+ [2+] : left foot dorsalis pedis 2+ [Oriented To Time, Place, And Person] : oriented to person, place, and time [Ankle Swelling (On Exam)] : not present [Varicose Veins Of Lower Extremities] : not present [] : not present [de-identified] : 5/5 muscle strength for all muscles and tendons crossing the foot and ankle joints, ankle joint and STJ ROM intact b/l. No pain with calf compression b/l. \par  [FreeTextEntry1] : Light touch sensation diminished to the plantar foot b/l.

## 2022-08-26 ENCOUNTER — APPOINTMENT (OUTPATIENT)
Dept: INTERNAL MEDICINE | Facility: CLINIC | Age: 52
End: 2022-08-26

## 2022-08-26 DIAGNOSIS — S72.002A FRACTURE OF UNSPECIFIED PART OF NECK OF LEFT FEMUR, INITIAL ENCOUNTER FOR CLOSED FRACTURE: ICD-10-CM

## 2022-08-26 DIAGNOSIS — M12.569 TRAUMATIC ARTHROPATHY, UNSPECIFIED KNEE: ICD-10-CM

## 2022-08-26 DIAGNOSIS — S72.92XA UNSPECIFIED FRACTURE OF LEFT FEMUR, INITIAL ENCOUNTER FOR CLOSED FRACTURE: ICD-10-CM

## 2022-08-26 PROCEDURE — 99442: CPT

## 2022-08-30 ENCOUNTER — APPOINTMENT (OUTPATIENT)
Dept: CARDIOLOGY | Facility: CLINIC | Age: 52
End: 2022-08-30

## 2022-08-30 ENCOUNTER — NON-APPOINTMENT (OUTPATIENT)
Age: 52
End: 2022-08-30

## 2022-08-30 VITALS
SYSTOLIC BLOOD PRESSURE: 157 MMHG | HEART RATE: 65 BPM | OXYGEN SATURATION: 94 % | HEIGHT: 70 IN | BODY MASS INDEX: 20.76 KG/M2 | DIASTOLIC BLOOD PRESSURE: 84 MMHG | WEIGHT: 145 LBS

## 2022-08-30 VITALS — DIASTOLIC BLOOD PRESSURE: 82 MMHG | SYSTOLIC BLOOD PRESSURE: 128 MMHG

## 2022-08-30 DIAGNOSIS — I47.2 VENTRICULAR TACHYCARDIA: ICD-10-CM

## 2022-08-30 PROBLEM — S72.92XA FEMUR FRACTURE, LEFT: Status: ACTIVE | Noted: 2021-08-11

## 2022-08-30 PROBLEM — S72.002A FRACTURE OF LEFT HIP: Status: ACTIVE | Noted: 2017-08-23

## 2022-08-30 PROCEDURE — 93000 ELECTROCARDIOGRAM COMPLETE: CPT

## 2022-08-30 PROCEDURE — 99215 OFFICE O/P EST HI 40 MIN: CPT | Mod: 25

## 2022-08-30 RX ORDER — METOPROLOL SUCCINATE 25 MG/1
25 TABLET, EXTENDED RELEASE ORAL DAILY
Refills: 0 | Status: DISCONTINUED | COMMUNITY
Start: 2022-04-18 | End: 2022-08-30

## 2022-08-30 NOTE — HISTORY OF PRESENT ILLNESS
[FreeTextEntry1] : Since his last visit, he has not been monitoring his diet. He noted increased edema which improved once he decreased his salt intake. He has been tolerating fluid removal with HD. His dry weight is 64kg. \par \par Currently He   denies any chest pain, PND, orthopnea, lower extremity edema, near syncope, syncope, strokelike symptoms. He denies any dyspnea.  He denies any palpitatins. \par

## 2022-08-30 NOTE — DISCUSSION/SUMMARY
[FreeTextEntry1] : 51 year man with a history as listed presents for a followup cardiac evaluation. \par Sai had a Zio with more WCT that is not explained. He is complaining of more GRAY. Given his historically normal nuclear stress he will now go for a coronary angiogram to rule out CAD. He has increased his Toprol to 100mg Qday.  He will get a 2d echo to assess for any  new structural heart disease, changes in valvular and ventricular function. \par He currently is maintaining SR.  His anticoagulation for PE and Af was never restarted. He will get a Zio did not reveal definitive AF. Will hold AC for now.  Volume removal via HD. He will followup with renal. \par He will continue Hydralazine 50mg q12. He will try to maintain a BP log at home. Reducing dietary salt intake advised. \par Exercise and diet counseling was performed in order to reduce her future cardiovascular risk. \par He will followup with me in 2  month or sooner if necessary. \par  \par   [EKG obtained to assist in diagnosis and management of assessed problem(s)] : EKG obtained to assist in diagnosis and management of assessed problem(s)

## 2022-08-30 NOTE — CARDIOLOGY SUMMARY
[de-identified] : SR IRBBB nonspecific ST changes [de-identified] : 8/18/22 SR with 1 run 5 NSVT, 42 beats AIVR.  frequent PSVT longest 32 seconds. no definitive PAT.  [de-identified] : 7/2020 pharm nuc neg SPECT  [de-identified] : 7/10/20 normal LV function and normal RV funciton.

## 2022-08-30 NOTE — REASON FOR VISIT
[FreeTextEntry1] : 49-year-old man with a history of end-stage renal disease on dialysis on the renal transplant list, rheumatoid arthritis,hypertension.\par \par he presented to  in 12/2020 with Flu.  He is s/p washout of infected collection and biopsy which revealed ATN.  He was found to have diffusely enlarging perinephric fluid collection.\par While on telemetry, he had an episode of a wide complex tachycardia. It initially appears irregular, suggesting an atrial arrhythmia with aberrancy, though the remainder appears more like an episode of non-sustained VT. s /p washout on 1/1/2020 with worsening sepsis and transfer to ICU, s/p IR drainage of perinephric collection on 1/6. 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 He underwent operative repair of right external iliac artery with pericardial patch, transplant nephrectomy, RLE thrombectomy, and IVC filter on 1/9. \par Then he had a s/p cardiac arrest likely from PE s/p TPA with  course complicated by PAFw rvr, and placed on amio,  \par He then returned to the SICU with significant lue swelling and active arterial extravasation, with drop in blood counts. He was then discharged to rehab once optimized. \par He had an echo in 2/2020 showing low normal LV function but severe PHTN. \par  \par He is now here for a hospital followup. he having a AVF put at [facility] with Dr. Rivas on 7/21/20\par He stopped taken his eliquis for about 2 weeks. he noted that he had more hematoma at home. \par \par  he had a mechanical fall in 6/2021 resulting in femur fracture s/p IMN.

## 2022-09-23 ENCOUNTER — APPOINTMENT (OUTPATIENT)
Dept: INTERNAL MEDICINE | Facility: CLINIC | Age: 52
End: 2022-09-23

## 2022-09-23 DIAGNOSIS — F41.9 ANXIETY DISORDER, UNSPECIFIED: ICD-10-CM

## 2022-09-23 DIAGNOSIS — M19.90 UNSPECIFIED OSTEOARTHRITIS, UNSPECIFIED SITE: ICD-10-CM

## 2022-09-23 PROCEDURE — 99441: CPT

## 2022-09-24 ENCOUNTER — OUTPATIENT (OUTPATIENT)
Dept: OUTPATIENT SERVICES | Facility: HOSPITAL | Age: 52
LOS: 1 days | End: 2022-09-24
Payer: COMMERCIAL

## 2022-09-24 DIAGNOSIS — N18.6 END STAGE RENAL DISEASE: Chronic | ICD-10-CM

## 2022-09-24 DIAGNOSIS — L02.31 CUTANEOUS ABSCESS OF BUTTOCK: Chronic | ICD-10-CM

## 2022-09-24 DIAGNOSIS — T83.410S BREAKDOWN (MECHANICAL) OF IMPLANTED PENILE PROSTHESIS, SEQUELA: Chronic | ICD-10-CM

## 2022-09-24 DIAGNOSIS — Z98.89 OTHER SPECIFIED POSTPROCEDURAL STATES: Chronic | ICD-10-CM

## 2022-09-24 DIAGNOSIS — I77.0 ARTERIOVENOUS FISTULA, ACQUIRED: Chronic | ICD-10-CM

## 2022-09-24 DIAGNOSIS — Z94.0 KIDNEY TRANSPLANT STATUS: Chronic | ICD-10-CM

## 2022-09-24 DIAGNOSIS — Z11.52 ENCOUNTER FOR SCREENING FOR COVID-19: ICD-10-CM

## 2022-09-24 LAB — SARS-COV-2 RNA SPEC QL NAA+PROBE: SIGNIFICANT CHANGE UP

## 2022-09-24 PROCEDURE — U0003: CPT

## 2022-09-24 PROCEDURE — U0005: CPT

## 2022-09-24 PROCEDURE — C9803: CPT

## 2022-09-26 ENCOUNTER — NON-APPOINTMENT (OUTPATIENT)
Age: 52
End: 2022-09-26

## 2022-09-27 ENCOUNTER — TRANSCRIPTION ENCOUNTER (OUTPATIENT)
Age: 52
End: 2022-09-27

## 2022-09-27 ENCOUNTER — OUTPATIENT (OUTPATIENT)
Dept: OUTPATIENT SERVICES | Facility: HOSPITAL | Age: 52
LOS: 1 days | End: 2022-09-27
Payer: COMMERCIAL

## 2022-09-27 VITALS
HEIGHT: 71 IN | DIASTOLIC BLOOD PRESSURE: 84 MMHG | WEIGHT: 145.06 LBS | HEART RATE: 84 BPM | SYSTOLIC BLOOD PRESSURE: 173 MMHG | TEMPERATURE: 98 F | OXYGEN SATURATION: 94 % | RESPIRATION RATE: 16 BRPM

## 2022-09-27 VITALS
SYSTOLIC BLOOD PRESSURE: 161 MMHG | HEART RATE: 62 BPM | OXYGEN SATURATION: 97 % | DIASTOLIC BLOOD PRESSURE: 79 MMHG | RESPIRATION RATE: 17 BRPM

## 2022-09-27 DIAGNOSIS — L02.31 CUTANEOUS ABSCESS OF BUTTOCK: Chronic | ICD-10-CM

## 2022-09-27 DIAGNOSIS — Z94.0 KIDNEY TRANSPLANT STATUS: Chronic | ICD-10-CM

## 2022-09-27 DIAGNOSIS — N18.6 END STAGE RENAL DISEASE: Chronic | ICD-10-CM

## 2022-09-27 DIAGNOSIS — I47.1 SUPRAVENTRICULAR TACHYCARDIA: ICD-10-CM

## 2022-09-27 DIAGNOSIS — I77.0 ARTERIOVENOUS FISTULA, ACQUIRED: Chronic | ICD-10-CM

## 2022-09-27 DIAGNOSIS — T83.410S BREAKDOWN (MECHANICAL) OF IMPLANTED PENILE PROSTHESIS, SEQUELA: Chronic | ICD-10-CM

## 2022-09-27 DIAGNOSIS — Z98.89 OTHER SPECIFIED POSTPROCEDURAL STATES: Chronic | ICD-10-CM

## 2022-09-27 PROBLEM — M19.90 ARTHRITIS: Status: ACTIVE | Noted: 2017-12-15

## 2022-09-27 PROBLEM — F41.9 ANXIETY: Status: ACTIVE | Noted: 2017-08-20

## 2022-09-27 LAB
ANION GAP SERPL CALC-SCNC: 18 MMOL/L — HIGH (ref 5–17)
BUN SERPL-MCNC: 36 MG/DL — HIGH (ref 7–23)
CALCIUM SERPL-MCNC: 8.8 MG/DL — SIGNIFICANT CHANGE UP (ref 8.4–10.5)
CHLORIDE SERPL-SCNC: 92 MMOL/L — LOW (ref 96–108)
CO2 SERPL-SCNC: 22 MMOL/L — SIGNIFICANT CHANGE UP (ref 22–31)
CREAT SERPL-MCNC: 7.37 MG/DL — HIGH (ref 0.5–1.3)
EGFR: 8 ML/MIN/1.73M2 — LOW
GLUCOSE SERPL-MCNC: 77 MG/DL — SIGNIFICANT CHANGE UP (ref 70–99)
HCT VFR BLD CALC: 35.8 % — LOW (ref 39–50)
HGB BLD-MCNC: 12.7 G/DL — LOW (ref 13–17)
MCHC RBC-ENTMCNC: 33.8 PG — SIGNIFICANT CHANGE UP (ref 27–34)
MCHC RBC-ENTMCNC: 35.5 GM/DL — SIGNIFICANT CHANGE UP (ref 32–36)
MCV RBC AUTO: 95.2 FL — SIGNIFICANT CHANGE UP (ref 80–100)
NRBC # BLD: 0 /100 WBCS — SIGNIFICANT CHANGE UP (ref 0–0)
PLATELET # BLD AUTO: 136 K/UL — LOW (ref 150–400)
POTASSIUM SERPL-MCNC: 6.7 MMOL/L — CRITICAL HIGH (ref 3.5–5.3)
POTASSIUM SERPL-SCNC: 6.7 MMOL/L — CRITICAL HIGH (ref 3.5–5.3)
RBC # BLD: 3.76 M/UL — LOW (ref 4.2–5.8)
RBC # FLD: 19.1 % — HIGH (ref 10.3–14.5)
SODIUM SERPL-SCNC: 132 MMOL/L — LOW (ref 135–145)
WBC # BLD: 4.76 K/UL — SIGNIFICANT CHANGE UP (ref 3.8–10.5)
WBC # FLD AUTO: 4.76 K/UL — SIGNIFICANT CHANGE UP (ref 3.8–10.5)

## 2022-09-27 PROCEDURE — C1887: CPT

## 2022-09-27 PROCEDURE — C1894: CPT

## 2022-09-27 PROCEDURE — 99152 MOD SED SAME PHYS/QHP 5/>YRS: CPT

## 2022-09-27 PROCEDURE — 85027 COMPLETE CBC AUTOMATED: CPT

## 2022-09-27 PROCEDURE — 93010 ELECTROCARDIOGRAM REPORT: CPT

## 2022-09-27 PROCEDURE — 93005 ELECTROCARDIOGRAM TRACING: CPT

## 2022-09-27 PROCEDURE — 80048 BASIC METABOLIC PNL TOTAL CA: CPT

## 2022-09-27 PROCEDURE — 93458 L HRT ARTERY/VENTRICLE ANGIO: CPT

## 2022-09-27 PROCEDURE — 93458 L HRT ARTERY/VENTRICLE ANGIO: CPT | Mod: 26

## 2022-09-27 PROCEDURE — C1769: CPT

## 2022-09-27 RX ORDER — HYDRALAZINE HCL 50 MG
1 TABLET ORAL
Qty: 0 | Refills: 0 | DISCHARGE

## 2022-09-27 RX ORDER — GABAPENTIN 400 MG/1
1 CAPSULE ORAL
Qty: 0 | Refills: 0 | DISCHARGE

## 2022-09-27 RX ORDER — SODIUM CHLORIDE 9 MG/ML
3 INJECTION INTRAMUSCULAR; INTRAVENOUS; SUBCUTANEOUS EVERY 8 HOURS
Refills: 0 | Status: DISCONTINUED | OUTPATIENT
Start: 2022-09-27 | End: 2022-10-11

## 2022-09-27 RX ORDER — FAMOTIDINE 10 MG/ML
1 INJECTION INTRAVENOUS
Qty: 0 | Refills: 0 | DISCHARGE

## 2022-09-27 NOTE — ASU DISCHARGE PLAN (ADULT/PEDIATRIC) - CARE PROVIDER_API CALL
Roman Goodson)  Internal Medicine  [address] [place] [address]  Phone: [phone]  Fax: [phone]  Follow Up Time: 2 weeks

## 2022-09-27 NOTE — ASU DISCHARGE PLAN (ADULT/PEDIATRIC) - ASU DC SPECIAL INSTRUCTIONSFT
Wound Care:   the day AFTER your procedure remove bandage GENTLY, and clean using  mild soap and warm water stream, pat dry. Leave the area OPEN to air. YOU MAY SHOWER 24 hour after procedure.   DO NOT apply lotions, creams, ointments, powder, perfumes to the puncture site.  DO NOT SOAK the site for 1 week (no tub bath, no swimming, etc.)  Check  your groin and /or wrist daily. A small amount of bruising and sourness are normal.     ACTIVITY: for 24 hours   - DO NOT DRIVE  - DO NOT make any important decisions or sign legal documents   - DO NOT operate heavy duty machineries   - you may resume sexual activity in 48 hours, unless otherwise instructed by your cardiologist     If your procedure was done through the WRIST: for the NEXT 3DAYS:  - avoid pushing, pulling, with that affected wrist   - avoid repeated movement of that hand and wrist (eg: typing, hammering)  - DO NOT LIFT anything more than 5 lbs     If your procedure was done through the GROIN: for the NEXT 5 DAYS  - Limit climbing stairs, DO NOT soak in bathtub or pool  - no strenuous activities, pushing, pulling, straining  - Do not lift anything 10lbs or heavier     MEDICATION:   Take your medications as explained (see discharge paperwork)   If you received a STENT, you will be taking antiplatelet medications to KEEP YOUR STENT OPEN (eg: Aspirin, Plavix, Brilinta, Effient, etc).  Take as prescribed, DO NOT STOP taking them without consulting with your cardiologist first.     Follow heart healthy diet recommended by your doctor, if you smoke STOP SMOKING (may call [phone] for center of tobacco control if you need assistance)     CALL your doctor to make appointment in 2 WEEKS     ***CALL YOUR DOCTOR***  if you experience: fever, chills, body aches, or severe pain, swelling, redness, heat or yellow discharge at incision site  If you experience Bleeding or excruciating pain at the procedural site, swelling (golf ball size) at your procedural site  If you experience CHEST PAIN  If you experience extremity numbness, tingling, temperature change (of your procedural site)   If you are unable to reach your doctor, you may contact:   -Cardiology Office at [facility] at [phone] or [facility] [phone]- [facility] [phone]

## 2022-09-27 NOTE — H&P CARDIOLOGY - HISTORY OF PRESENT ILLNESS
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 Young, followed by Dr. Raphael, [facility] Oncologist), RA with hand contractures who presented to [facility] on 12/27/19 from dialysis for a fever. 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 His post op course c/b DGF requiring HD, thrombocytopenia, elevated LDH and Haptoglobulin. Schistocytes  on peripheral smear concerning for TMA. Envarsus held. Received Belatacept 12/13. Started on PLEX (12/12, 12/15). Underwent renal bx on 12/13 c/w profound ATN.  Found to have much improvement to levels, likely related to Tacrolimus.  While on telemetry, he had an episode of a wide complex tachycardia. It initially appears irregular, suggesting an atrial arrhythmia with aberrancy, though the remainder appears more like an episode of non-sustained VT. s /p washout on 1/1/2020 with worsening sepsis and transfer to ICU, s/p IR drainage of perinephric collection on 1/6. 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 He underwent operative repair of right external iliac artery with pericardial patch, transplant nephrectomy, His course was notable on 1/8 pt was found to have RLE DVT involving ext iliac, common fem veins, s/p thrombectomy and IVC filter placement, currently on treatment. Hospital course was further complicated by cardiac arrest on 1/13 due to massive acute saddle pulmonary embolism with acute cor pulmonale and acute DVT of the brachial vein in RUE. Pt was started on Eliquis. His course was complicated on 2/1 with AVF infiltrated during HD with aterial extravasation/bleeding to the left upper arm and chest seen on CTa. On 2/3, he had an arteriography, which demonstrated a tiny pseudoaneurysm arising from a distal branch of the left deep brachial artery and so permacath was placed on 2/11. He was also noted to have newly diagnosed afib started on amio   He was also noted to have thrombocytosis and hematalogy-oncology work up was done with plans for outpatient. Zio patch reveals more WCT & pt c/o GRAY, stress test was normal Seen & evaluated by Roman vazquez & now recommends for [facility].  He had an echo in 2/2020 showing low normal LV function but severe PHTN.

## 2022-09-27 NOTE — ASU DISCHARGE PLAN (ADULT/PEDIATRIC) - NS MD DC FALL RISK RISK
For information on Fall & Injury Prevention, visit: [email or web address] OR  [email or web address] OR  https://www.cdc.gov/steadi/patient.html

## 2022-10-11 NOTE — PACU DISCHARGE NOTE - NAUSEA/VOMITING:
Patient returned writer's call. She stated she \"still has 40cc.\" The lowest output has been 25cc in a 24 hour period. She stated she has a clot in the bulb. Writer encouraged her to continue striping the tube, which she has been. Patient stated the skin surrounding the tube looks good and her pain is in control. She has a rash near her belly button, which she thinks is from the tape. Writer recommended she take benadryl. She stated she will call before the weekend and verbalized understanding to all.    Writer called patient to follow-up on her RADHA drain output. Vm left asking for callback; number given.   None

## 2022-10-19 ENCOUNTER — APPOINTMENT (OUTPATIENT)
Dept: WOUND CARE | Facility: CLINIC | Age: 52
End: 2022-10-19

## 2022-11-01 ENCOUNTER — APPOINTMENT (OUTPATIENT)
Dept: CARDIOLOGY | Facility: CLINIC | Age: 52
End: 2022-11-01

## 2022-11-01 PROCEDURE — 93306 TTE W/DOPPLER COMPLETE: CPT

## 2022-11-08 ENCOUNTER — APPOINTMENT (OUTPATIENT)
Dept: CARDIOLOGY | Facility: CLINIC | Age: 52
End: 2022-11-08

## 2022-11-08 ENCOUNTER — NON-APPOINTMENT (OUTPATIENT)
Age: 52
End: 2022-11-08

## 2022-11-08 VITALS
DIASTOLIC BLOOD PRESSURE: 90 MMHG | HEIGHT: 70 IN | BODY MASS INDEX: 20.76 KG/M2 | WEIGHT: 145 LBS | HEART RATE: 59 BPM | OXYGEN SATURATION: 93 % | SYSTOLIC BLOOD PRESSURE: 145 MMHG

## 2022-11-08 PROCEDURE — 93000 ELECTROCARDIOGRAM COMPLETE: CPT

## 2022-11-08 PROCEDURE — 99214 OFFICE O/P EST MOD 30 MIN: CPT | Mod: 25

## 2022-11-08 NOTE — REASON FOR VISIT
[FreeTextEntry1] : 49-year-old man with a history of end-stage renal disease on dialysis on the renal transplant list, rheumatoid arthritis,hypertension.\par \par he presented to  in 12/2020 with Flu.  He is s/p washout of infected collection and biopsy which revealed ATN.  He was found to have diffusely enlarging perinephric fluid collection.\par While on telemetry, he had an episode of a wide complex tachycardia. It initially appears irregular, suggesting an atrial arrhythmia with aberrancy, though the remainder appears more like an episode of non-sustained VT. s /p washout on 1/1/2020 with worsening sepsis and transfer to ICU, s/p IR drainage of perinephric collection on 1/6. 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 He underwent operative repair of right external iliac artery with pericardial patch, transplant nephrectomy, RLE thrombectomy, and IVC filter on 1/9. \par Then he had a s/p cardiac arrest likely from PE s/p TPA with  course complicated by PAFw rvr, and placed on amio,  \par He then returned to the SICU with significant lue swelling and active arterial extravasation, with drop in blood counts. He was then discharged to rehab once optimized. \par He had an echo in 2/2020 showing low normal LV function but severe PHTN. \par  \par He is now here for a hospital followup. he having a AVF put at [facility] with Dr. Rivas on 7/21/20\par He stopped taken his eliquis for about 2 weeks. he noted that he had more hematoma at home. \par \par  he had a mechanical fall in 6/2021 resulting in femur fracture s/p IMN.

## 2022-11-08 NOTE — DISCUSSION/SUMMARY
[FreeTextEntry1] : 51 year man with a history as listed presents for a followup cardiac evaluation. \par Sai is doing relatively well. He denies any anginal symptoms. Clinically he is euvolemic on exam. His EKG is unchanged from previous. his LHC showed no significant obstructive disease. He had a Zio with more WCT that may be from RV failure or electrolyte disturbances. .  He will continue Toprol  100mg Qday.  \par He currently is maintaining SR.  His anticoagulation for PE and Af was never restarted. I would send to pulmonary for evaluation for PHTN. He may need a nuclear medicine to r/o Chronic thromboembolic disease. \par Volume removal via HD. He will followup with renal. \par He will continue Hydralazine 50mg q12. He will try to maintain a BP log at home. Reducing dietary salt intake advised. \par Exercise and diet counseling was performed in order to reduce her future cardiovascular risk. \par He will followup with me in 3  month or sooner if necessary. \par  \par   [EKG obtained to assist in diagnosis and management of assessed problem(s)] : EKG obtained to assist in diagnosis and management of assessed problem(s)

## 2022-11-08 NOTE — HISTORY OF PRESENT ILLNESS
[FreeTextEntry1] : Since his last visit, he has been feeling relatively well. \par Currently He   denies any chest pain, PND, orthopnea, lower extremity edema, near syncope, syncope, strokelike symptoms. He denies any dyspnea.  He denies any palpitations. \par  he has  been monitoring his diet and his lower extremity edema improved. He has been tolerating fluid removal with HD. His dry weight is 64kg. \par \par \par

## 2022-11-08 NOTE — CARDIOLOGY SUMMARY
[de-identified] : SR IRBBB nonspecific ST changes [de-identified] : 8/18/22 SR with 1 run 5 NSVT, 42 beats AIVR.  frequent PSVT longest 32 seconds. no definitive PAT.  [de-identified] : 7/2020 pharm nuc neg SPECT  [de-identified] : 7/10/20 normal LV function and normal RV funciton. \par 11/2022 normal LV function and normal RV function.  severer PHTN 83.  [de-identified] : 9/27/22 nonobstreuctive

## 2022-11-18 ENCOUNTER — RX RENEWAL (OUTPATIENT)
Age: 52
End: 2022-11-18

## 2022-12-12 ENCOUNTER — APPOINTMENT (OUTPATIENT)
Dept: INTERNAL MEDICINE | Facility: CLINIC | Age: 52
End: 2022-12-12

## 2023-01-23 ENCOUNTER — APPOINTMENT (OUTPATIENT)
Dept: PODIATRY | Facility: CLINIC | Age: 53
End: 2023-01-23
Payer: MEDICARE

## 2023-01-23 VITALS
HEART RATE: 87 BPM | WEIGHT: 145 LBS | OXYGEN SATURATION: 99 % | HEIGHT: 70 IN | SYSTOLIC BLOOD PRESSURE: 146 MMHG | TEMPERATURE: 97.7 F | BODY MASS INDEX: 20.76 KG/M2 | DIASTOLIC BLOOD PRESSURE: 87 MMHG

## 2023-01-23 DIAGNOSIS — M20.40 OTHER HAMMER TOE(S) (ACQUIRED), UNSPECIFIED FOOT: ICD-10-CM

## 2023-01-23 PROCEDURE — XXXXX: CPT | Mod: 1L

## 2023-01-24 ENCOUNTER — RX RENEWAL (OUTPATIENT)
Age: 53
End: 2023-01-24

## 2023-01-24 RX ORDER — GABAPENTIN 100 MG/1
100 CAPSULE ORAL
Qty: 60 | Refills: 0 | Status: ACTIVE | COMMUNITY
Start: 2019-12-18 | End: 1900-01-01

## 2023-01-31 ENCOUNTER — APPOINTMENT (OUTPATIENT)
Dept: INTERNAL MEDICINE | Facility: CLINIC | Age: 53
End: 2023-01-31
Payer: MEDICARE

## 2023-01-31 VITALS
TEMPERATURE: 97.7 F | SYSTOLIC BLOOD PRESSURE: 140 MMHG | RESPIRATION RATE: 17 BRPM | WEIGHT: 142 LBS | DIASTOLIC BLOOD PRESSURE: 85 MMHG | OXYGEN SATURATION: 95 % | HEART RATE: 75 BPM | BODY MASS INDEX: 20.33 KG/M2 | HEIGHT: 70 IN

## 2023-01-31 DIAGNOSIS — Z90.81 ACQUIRED ABSENCE OF SPLEEN: ICD-10-CM

## 2023-01-31 DIAGNOSIS — Z00.00 ENCOUNTER FOR GENERAL ADULT MEDICAL EXAMINATION W/OUT ABNORMAL FINDINGS: ICD-10-CM

## 2023-01-31 DIAGNOSIS — Z87.39 PERSONAL HISTORY OF OTHER DISEASES OF THE MUSCULOSKELETAL SYSTEM AND CONNECTIVE TISSUE: ICD-10-CM

## 2023-01-31 DIAGNOSIS — G56.00 CARPAL TUNNEL SYNDROME, UNSPECIFIED UPPER LIMB: ICD-10-CM

## 2023-01-31 DIAGNOSIS — M06.9 RHEUMATOID ARTHRITIS, UNSPECIFIED: ICD-10-CM

## 2023-01-31 DIAGNOSIS — M54.2 CERVICALGIA: ICD-10-CM

## 2023-01-31 PROCEDURE — G0439: CPT

## 2023-01-31 PROCEDURE — 99214 OFFICE O/P EST MOD 30 MIN: CPT | Mod: 25

## 2023-02-10 ENCOUNTER — APPOINTMENT (OUTPATIENT)
Dept: MRI IMAGING | Facility: CLINIC | Age: 53
End: 2023-02-10

## 2023-03-15 NOTE — PROVIDER CONTACT NOTE (CRITICAL VALUE NOTIFICATION) - ASSESSMENT
Nephrology Progress Note           Subjective     Seen and examined today.   Feels ok.   + loose stools  No sob  S/p HD yesterday  Labs and vitals reviewed.       A/P  ESRD; HD on TTS, no need today. HD tomorrow at HD center.     Ok for discharge planning.         Objective       Last Recorded Vitals  Blood pressure (!) 143/69, pulse 71, temperature 99 °F (37.2 °C), temperature source Oral, resp. rate 16, SpO2 97 %.  There is no height or weight on file to calculate BMI.    Physical Exam  Awake  No supplemental oxygen  CTAB  RR  AVF  No edema      LABORATORY DATA:    Lab Results   Component Value Date    SODIUM 137 03/15/2023    POTASSIUM 4.0 03/15/2023    CO2 31 03/15/2023    BUN 45 (H) 03/15/2023    CREATININE 5.79 (H) 03/15/2023    GLUCOSE 97 03/15/2023      Lab Results   Component Value Date    CALCIUM 8.4 03/15/2023         Inpatient Medications:  Current Facility-Administered Medications   Medication Dose Route Frequency Provider Last Rate Last Admin   • nystatin (MYCOSTATIN) ointment   Topical TID Humaira Allison, CNP       • lidocaine HCl (PF) (XYLOCAINE) 1 % injection 50 mg  5 mL Injection Once Wesly Mendiola MD       • apixaBAN (ELIQUIS) tablet 5 mg  5 mg Oral 2 times per day Jazmín Don MD   5 mg at 03/14/23 2128   • aspirin (ECOTRIN) enteric coated tablet 81 mg  81 mg Oral Daily Jazmín Don MD   81 mg at 03/14/23 2128   • atorvastatin (LIPITOR) tablet 40 mg  40 mg Oral Daily Jazmín Don MD   40 mg at 03/14/23 2128   • carvedilol (COREG) tablet 25 mg  25 mg Oral 2 times per day Jazmín Don MD   25 mg at 03/14/23 2127   • cinacalcet (SENSIPAR) tablet 30 mg  30 mg Oral QHS Jazmín Don MD   30 mg at 03/14/23 2128   • ferrous sulfate (65 mg Fe per 325 mg) tablet 325 mg  325 mg Oral Daily with breakfast Jazmín Don MD       • folic acid (FOLATE) tablet 800 mcg  800 mcg Oral Daily Jazmín Don MD   800 mcg at 03/14/23 2128   • furosemide (LASIX) tablet 80 mg  80 mg Oral BID Jazmín Salman,  MD   80 mg at 03/14/23 2128   • hydrALAZINE (APRESOLINE) tablet 100 mg  100 mg Oral TID Jazmín Don MD   100 mg at 03/14/23 2128   • isosorbide mononitrate (IMDUR) ER tablet 30 mg  30 mg Oral Daily Jazmín Don MD   30 mg at 03/14/23 2127   • losartan (COZAAR) tablet 100 mg  100 mg Oral Daily Jazmín Don MD   100 mg at 03/14/23 2128   • sevelamer carbonate (RENVELA) tablet 1,600 mg  1,600 mg Oral TID WC Jazmín Don MD       • umeclidinium bromide (INCRUSE ELLIPTA) 62.5 MCG/ACT inhaler 1 puff  1 puff Inhalation Daily Resp Jazmín Don MD       • venlafaxine XR (EFFEXOR XR) 24 hr capsule 75 mg  75 mg Oral Daily Jazmín Don MD   75 mg at 03/14/23 2128   • rifAXIMin (XIFAXAN) tablet 550 mg  550 mg Oral Q8H Jazmín Don MD   550 mg at 03/15/23 0618   • NIFEdipine XL (PROCARDIA XL) ER tablet 60 mg  60 mg Oral Daily Jazmín Don MD       • levothyroxine (SYNTHROID, LEVOTHROID) tablet 225 mcg  225 mcg Oral QAM AC Jazmín Don MD   225 mcg at 03/15/23 0618            Felipa Gutierrez MD  [facility]  [phone]    3/15/2023           Patient has no complaints, vitals as charted.

## 2023-03-28 ENCOUNTER — APPOINTMENT (OUTPATIENT)
Dept: CARDIOLOGY | Facility: CLINIC | Age: 53
End: 2023-03-28

## 2023-04-06 ENCOUNTER — APPOINTMENT (OUTPATIENT)
Dept: CARDIOLOGY | Facility: CLINIC | Age: 53
End: 2023-04-06

## 2023-04-24 ENCOUNTER — APPOINTMENT (OUTPATIENT)
Dept: PODIATRY | Facility: CLINIC | Age: 53
End: 2023-04-24
Payer: MEDICARE

## 2023-04-24 ENCOUNTER — NON-APPOINTMENT (OUTPATIENT)
Age: 53
End: 2023-04-24

## 2023-04-24 VITALS
DIASTOLIC BLOOD PRESSURE: 105 MMHG | OXYGEN SATURATION: 97 % | WEIGHT: 142 LBS | TEMPERATURE: 97.2 F | HEART RATE: 63 BPM | BODY MASS INDEX: 20.33 KG/M2 | HEIGHT: 70 IN | SYSTOLIC BLOOD PRESSURE: 172 MMHG

## 2023-04-24 DIAGNOSIS — L84 CORNS AND CALLOSITIES: ICD-10-CM

## 2023-04-24 DIAGNOSIS — M20.10 HALLUX VALGUS (ACQUIRED), UNSPECIFIED FOOT: ICD-10-CM

## 2023-04-24 DIAGNOSIS — B35.1 TINEA UNGUIUM: ICD-10-CM

## 2023-04-24 DIAGNOSIS — M21.619 BUNION OF UNSPECIFIED FOOT: ICD-10-CM

## 2023-04-24 DIAGNOSIS — G62.9 POLYNEUROPATHY, UNSPECIFIED: ICD-10-CM

## 2023-04-24 PROCEDURE — 11055 PARING/CUTG B9 HYPRKER LES 1: CPT | Mod: 1L

## 2023-04-24 PROCEDURE — 11721 DEBRIDE NAIL 6 OR MORE: CPT | Mod: 1L,59

## 2023-04-26 NOTE — PROGRESS NOTE ADULT - PROVIDER SPECIALTY LIST ADULT
Transplant Medicine Topical Retinoid counseling:  Patient advised to apply a pea-sized amount only at bedtime and wait 30 minutes after washing their face before applying.  If too drying, patient may add a non-comedogenic moisturizer. The patient verbalized understanding of the proper use and possible adverse effects of retinoids.  All of the patient's questions and concerns were addressed.

## 2023-06-05 ENCOUNTER — NON-APPOINTMENT (OUTPATIENT)
Age: 53
End: 2023-06-05

## 2023-06-05 ENCOUNTER — RX RENEWAL (OUTPATIENT)
Age: 53
End: 2023-06-05

## 2023-06-27 PROBLEM — M20.40 HAMMER TOE, ACQUIRED: Status: ACTIVE | Noted: 2023-06-27

## 2023-06-27 NOTE — PHYSICAL EXAM
[General Appearance - Alert] : alert [General Appearance - In No Acute Distress] : in no acute distress [Ankle Swelling (On Exam)] : not present [Varicose Veins Of Lower Extremities] : not present [] : not present [1+] : left foot posterior tibialis 1+ [2+] : left foot dorsalis pedis 2+ [de-identified] : 5/5 muscle strength for all muscles and tendons crossing the foot and ankle joints, ankle joint and STJ ROM intact b/l. No pain with calf compression b/l. \par  [FreeTextEntry1] : Light touch sensation diminished to the plantar foot b/l.  [Oriented To Time, Place, And Person] : oriented to person, place, and time

## 2023-06-27 NOTE — ASSESSMENT
[FreeTextEntry1] : Pt was seen and examined. Discussed the treatment options with the patient and had debrided the lesion x 1 full thickness at the last visit, No signs of infection, recommended use of Vaseline or similar product to decrease friction, stretched shoes, instructed patient to use appropriate shoegear.  \par Pt was examined and evaluated. Discussed etiology and pt advised regarding possible etiology of symptoms. Debrided nails 1-5  using sterile nail nippers and aseptic technique without incident. Discussed with pt to wear supportive shoe gear with wide toed shoes and extra depth in toe box to accommodate the deformity.  Spent 30 minutes in patient care and medical decision making.\par  Also advised patient to monitor for any signs of infection and visit the ED immediately. Patient to follow up in one week. Spent 20 minutes for patient care and medical decision making.\par \par

## 2023-06-27 NOTE — REVIEW OF SYSTEMS
[Fever] : no fever [Chills] : no chills [Eye Pain] : no eye pain [Earache] : no earache [Loss Of Hearing] : no hearing loss [Chest Pain] : no chest pain [Shortness Of Breath] : no shortness of breath [Cough] : no cough [Abdominal Pain] : no abdominal pain [Joint Swelling] : no joint swelling [Skin Lesions] : skin lesion [Confused] : no confusion [Dizziness] : no dizziness [FreeTextEntry9] : sensitive toe nails  [de-identified] : callus to the right foot at the first metatarsal head

## 2023-06-27 NOTE — REASON FOR VISIT
[Follow-Up Visit] : a follow-up visit for [Foot Pain] : foot pain [FreeTextEntry2] : Patient is here today for routine nail care.  Callus is better.

## 2023-06-27 NOTE — HISTORY OF PRESENT ILLNESS
[FreeTextEntry1] : Patient is 52 male presenting for follow up complaining of thickened, elongated nails to the right and left digits 1-5. Patient relates that it has been present for approximately several years and notes that it has been worsening. Patient does not recall trauma to the nails in the past and relates that the nails are not  from the nail bed. Patient states that the nails are thickened and elongated and cause pain when walking with shoes. \par  Patient had painful callus to the right foot that is causing pain when walking and wearing shoes \par

## 2023-07-06 ENCOUNTER — APPOINTMENT (OUTPATIENT)
Dept: CARDIOLOGY | Facility: CLINIC | Age: 53
End: 2023-07-06
Payer: MEDICARE

## 2023-07-06 PROCEDURE — 93306 TTE W/DOPPLER COMPLETE: CPT

## 2023-07-08 NOTE — ASU PATIENT PROFILE, ADULT - FALL HARM RISK - UNIVERSAL INTERVENTIONS
- - - Bed in lowest position, wheels locked, appropriate side rails in place/Call bell, personal items and telephone in reach/Instruct patient to call for assistance before getting out of bed or chair/Non-slip footwear when patient is out of bed/[place] to call system/Physically safe environment - no spills, clutter or unnecessary equipment/Purposeful Proactive Rounding/Room/bathroom lighting operational, light cord in reach

## 2023-07-10 NOTE — ED PROVIDER NOTE - PRO INTERPRETER NEED 2
chest wall non-tender, breathing is unlabored without accessory muscle use, normal breath sounds English

## 2023-07-11 ENCOUNTER — NON-APPOINTMENT (OUTPATIENT)
Age: 53
End: 2023-07-11

## 2023-07-11 ENCOUNTER — APPOINTMENT (OUTPATIENT)
Dept: CARDIOLOGY | Facility: CLINIC | Age: 53
End: 2023-07-11
Payer: MEDICARE

## 2023-07-11 VITALS
OXYGEN SATURATION: 99 % | BODY MASS INDEX: 20.33 KG/M2 | HEIGHT: 70 IN | WEIGHT: 142 LBS | HEART RATE: 77 BPM | DIASTOLIC BLOOD PRESSURE: 70 MMHG | SYSTOLIC BLOOD PRESSURE: 122 MMHG

## 2023-07-11 DIAGNOSIS — N18.6 END STAGE RENAL DISEASE: ICD-10-CM

## 2023-07-11 DIAGNOSIS — Z99.2 END STAGE RENAL DISEASE: ICD-10-CM

## 2023-07-11 PROCEDURE — 93000 ELECTROCARDIOGRAM COMPLETE: CPT

## 2023-07-11 PROCEDURE — 99214 OFFICE O/P EST MOD 30 MIN: CPT | Mod: 25

## 2023-07-11 RX ORDER — METOPROLOL SUCCINATE 100 MG/1
100 TABLET, EXTENDED RELEASE ORAL DAILY
Qty: 90 | Refills: 2 | Status: DISCONTINUED | COMMUNITY
Start: 2022-08-30 | End: 2023-07-11

## 2023-07-11 RX ORDER — HYDRALAZINE HYDROCHLORIDE 50 MG/1
50 TABLET ORAL
Qty: 90 | Refills: 2 | Status: ACTIVE | COMMUNITY
Start: 2022-04-18

## 2023-07-11 NOTE — PHYSICAL EXAM
[Well Groomed] : well groomed [General Appearance - In No Acute Distress] : no acute distress [Eyelids - No Xanthelasma] : the eyelids demonstrated no xanthelasmas [Normal Oral Mucosa] : normal oral mucosa [No Oral Pallor] : no oral pallor [No Oral Cyanosis] : no oral cyanosis [Respiration, Rhythm And Depth] : normal respiratory rhythm and effort [Exaggerated Use Of Accessory Muscles For Inspiration] : no accessory muscle use [Auscultation Breath Sounds / Voice Sounds] : lungs were clear to auscultation bilaterally [Abdomen Soft] : soft [Abdomen Tenderness] : non-tender [Abdomen Mass (___ Cm)] : no abdominal mass palpated [Abnormal Walk] : normal gait [Petechial Hemorrhages (___cm)] : no petechial hemorrhages [Skin Color & Pigmentation] : normal skin color and pigmentation [] : no rash [Oriented To Time, Place, And Person] : oriented to person, place, and time [Affect] : the affect was normal [Mood] : the mood was normal [No Anxiety] : not feeling anxious [Normal Rate] : normal [Rhythm Regular] : regular [Normal S1] : normal S1 [Normal S2] : normal S2 [2+] : left 2+ [No Pitting Edema] : no pitting edema present [Well Developed] : well developed [Well Nourished] : well nourished [No Acute Distress] : no acute distress [Normal Conjunctiva] : normal conjunctiva [Normal Venous Pressure] : normal venous pressure [No Carotid Bruit] : no carotid bruit [Normal S1, S2] : normal S1, S2 [No Murmur] : no murmur [No Rub] : no rub [No Gallop] : no gallop [Clear Lung Fields] : clear lung fields [Good Air Entry] : good air entry [No Respiratory Distress] : no respiratory distress  [Decreased Breath Sounds] : breath sounds were decreased diffusely [Soft] : abdomen soft [Non Tender] : non-tender [No Masses/organomegaly] : no masses/organomegaly [Normal Bowel Sounds] : normal bowel sounds [No Cyanosis] : no cyanosis [No Clubbing] : no clubbing [No Varicosities] : no varicosities [No Rash] : no rash [No Skin Lesions] : no skin lesions [Moves all extremities] : moves all extremities [No Focal Deficits] : no focal deficits [Normal Speech] : normal speech [Alert and Oriented] : alert and oriented [Normal memory] : normal memory [de-identified] : with cane  [de-identified] : ankle edema [FreeTextEntry1] : no jvd [Right Carotid Bruit] : no bruit heard over the right carotid [Left Carotid Bruit] : no bruit heard over the left carotid [Bruit] : no bruit heard

## 2023-07-11 NOTE — DISCUSSION/SUMMARY
[EKG obtained to assist in diagnosis and management of assessed problem(s)] : EKG obtained to assist in diagnosis and management of assessed problem(s) [FreeTextEntry1] : 52 year man with a history as listed presents for a followup cardiac evaluation. \par Sai is doing relatively well. He denies any anginal symptoms. Clinically he is euvolemic on exam. His EKG is unchanged from previous. his LHC showed no significant obstructive disease. \par I have low suspicion that His symptoms of "pinching" is from a cardiac source. I have asked that he continue to monitor for now. \par Echo cardiogram from this month was unchanged. Demonstrating hyperdynamic LVEF, grade II diastolic and severe Pulm HTN.\par   He is experiencing low blood pressure readings during dialysis.  Hydralazine is already once a day 50mg, he will hold on days of dialysis.  We will change metoprolol to tartrate 50mg BID, he will hold on days of dialysis.   \par He will check his blood pressure at home. He will let us know how his blood pressure is doing.\par \par He currently is maintaining SR.  His anticoagulation for PE and Af was never restarted. I would send to pulmonary for evaluation for PHTN. He may need a nuclear medicine to r/o Chronic thromboembolic disease. \par Volume removal via HD. He will followup with renal. \par We will obtain most recent blood work.\par \par Exercise and diet counseling was performed in order to reduce her future cardiovascular risk. \par He will followup with me in 3  month or sooner if necessary. \par  \par

## 2023-07-11 NOTE — CARDIOLOGY SUMMARY
[de-identified] : SR IRBBB nonspecific ST changes [de-identified] : 8/18/22 SR with 1 run 5 NSVT, 42 beats AIVR.  frequent PSVT longest 32 seconds. no definitive PAT.  [de-identified] : 7/2020 pharm nuc neg SPECT  [de-identified] : 7/10/20 normal LV function and normal RV funciton. \par 11/2022 normal LV function and normal RV function.  severer PHTN 83. \par 7/2023-LVEF 75%,grade 2 diastolic,PASP 73 mmhg-severe pulm HTN [de-identified] : 9/27/22 nonobstructive  none

## 2023-07-11 NOTE — REASON FOR VISIT
[CV Risk Factors and Non-Cardiac Disease] : CV risk factors and non-cardiac disease [Hyperlipidemia] : hyperlipidemia [Hypertension] : hypertension [Coronary Artery Disease] : coronary artery disease [FreeTextEntry1] : 52-year-old man with a history of end-stage renal disease on dialysis on the renal transplant list, rheumatoid arthritis,hypertension.\par \par he presented to  in 12/2020 with Flu.  He is s/p washout of infected collection and biopsy which revealed ATN.  He was found to have diffusely enlarging perinephric fluid collection.\par While on telemetry, he had an episode of a wide complex tachycardia. It initially appears irregular, suggesting an atrial arrhythmia with aberrancy, though the remainder appears more like an episode of non-sustained VT. s /p washout on 1/1/2020 with worsening sepsis and transfer to ICU, s/p IR drainage of perinephric collection on 1/6. 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 He underwent operative repair of right external iliac artery with pericardial patch, transplant nephrectomy, RLE thrombectomy, and IVC filter on 1/9. \par Then he had a s/p cardiac arrest likely from PE s/p TPA with  course complicated by PAFw rvr, and placed on amio,  \par He then returned to the SICU with significant lue swelling and active arterial extravasation, with drop in blood counts. He was then discharged to rehab once optimized. \par He had an echo in 2/2020 showing low normal LV function but severe PHTN. \par  \par He is now here for a hospital followup. he having a AVF put at [facility] with Dr. Rivas on 7/21/20\par He stopped taken his eliquis for about 2 weeks. he noted that he had more hematoma at home. \par \par  he had a mechanical fall in 6/2021 resulting in femur fracture s/p IMN.

## 2023-07-11 NOTE — HISTORY OF PRESENT ILLNESS
[FreeTextEntry1] : Since his last visit, he has been feeling relatively well. \par  he has  been monitoring his diet and his lower extremity edema improved. He has been tolerating fluid removal with HD. His dry weight is 64kg. \par He sustained a fall while cooking in the kitchen in April, was taken to [facility].  Was told he had a fractured pelvis.  Was discharged to [place] [facility] and spent about one month there.\par \par He is complaining of "pins and needles " at the chest area that started 3 weeks ago.  He reports onset is random.  No associated dizziness, shortness of breath.  NO association with exertion. Alleviating factors being massage and scratching.  He has not experienced recurrence in 3 days.\par \par He has dialysis MWF.  They have been experiencing drops in blood pressure not long into his session, readings 80-90s systolic. He feels they have been removing "too much fluid".  He does better maintaining weight of 63 KG.  He also feels that he does better on metoprolol tartrate, succinate remains in his system too long despite holding it on days of dialysis.\par Blood pressure at home has been 120s systolic\par \par He is thinking about getting back on the transplant list... but wants to "get his affairs in order first".\par \par Currently He   denies any chest pain, PND, orthopnea, lower extremity edema, near syncope, syncope, strokelike symptoms. He denies any dyspnea.  He denies any palpitations. \par \par \par \par \par

## 2023-07-24 ENCOUNTER — RX RENEWAL (OUTPATIENT)
Age: 53
End: 2023-07-24

## 2023-07-25 ENCOUNTER — APPOINTMENT (OUTPATIENT)
Dept: INTERNAL MEDICINE | Facility: CLINIC | Age: 53
End: 2023-07-25
Payer: MEDICARE

## 2023-07-25 VITALS
OXYGEN SATURATION: 96 % | SYSTOLIC BLOOD PRESSURE: 130 MMHG | HEART RATE: 88 BPM | DIASTOLIC BLOOD PRESSURE: 80 MMHG | BODY MASS INDEX: 20.33 KG/M2 | TEMPERATURE: 97.6 F | RESPIRATION RATE: 17 BRPM | HEIGHT: 70 IN | WEIGHT: 142 LBS

## 2023-07-25 DIAGNOSIS — I10 ESSENTIAL (PRIMARY) HYPERTENSION: ICD-10-CM

## 2023-07-25 DIAGNOSIS — Z94.0 KIDNEY TRANSPLANT STATUS: ICD-10-CM

## 2023-07-25 PROCEDURE — 99214 OFFICE O/P EST MOD 30 MIN: CPT

## 2023-07-26 LAB
ESTIMATED AVERAGE GLUCOSE: 97 MG/DL
HBA1C MFR BLD HPLC: 5 %
PSA FREE FLD-MCNC: 26 %
PSA FREE SERPL-MCNC: 0.17 NG/ML
PSA SERPL-MCNC: 0.65 NG/ML
T4 FREE SERPL-MCNC: 1.3 NG/DL
TSH SERPL-ACNC: 3.89 UIU/ML

## 2023-07-28 ENCOUNTER — APPOINTMENT (OUTPATIENT)
Dept: PODIATRY | Facility: CLINIC | Age: 53
End: 2023-07-28

## 2023-07-31 ENCOUNTER — APPOINTMENT (OUTPATIENT)
Dept: INTERNAL MEDICINE | Facility: CLINIC | Age: 53
End: 2023-07-31

## 2023-08-04 ENCOUNTER — RX CHANGE (OUTPATIENT)
Age: 53
End: 2023-08-04

## 2023-08-04 RX ORDER — METOPROLOL TARTRATE 50 MG/1
50 TABLET, FILM COATED ORAL TWICE DAILY
Qty: 180 | Refills: 3 | Status: ACTIVE | COMMUNITY
Start: 2023-07-11

## 2023-08-24 RX ORDER — OXYCODONE 5 MG/1
5 TABLET ORAL DAILY
Qty: 15 | Refills: 0 | Status: ACTIVE | COMMUNITY
Start: 2021-08-11 | End: 1900-01-01

## 2023-08-25 PROBLEM — B35.1 ONYCHOMYCOSIS OF TOENAIL: Status: ACTIVE | Noted: 2023-08-25

## 2023-08-25 PROBLEM — M20.10 HALLUX VALGUS, ACQUIRED: Status: ACTIVE | Noted: 2023-08-25

## 2023-08-25 PROBLEM — L84 CALLUS: Status: ACTIVE | Noted: 2022-08-17

## 2023-08-25 PROBLEM — M21.619 BUNION OF GREAT TOE: Status: ACTIVE | Noted: 2023-06-27

## 2023-08-25 PROBLEM — G62.9 NEUROPATHY: Status: ACTIVE | Noted: 2017-06-21

## 2023-08-25 NOTE — ASSESSMENT
[FreeTextEntry1] : Pt was seen and examined. Discussed the treatment options with the patient and had debrided the lesion x 1 full thickness at the last visit, No signs of infection, recommended use of Vaseline or similar product to decrease friction, stretched shoes, instructed patient to use appropriate shoegear.   Pt was examined and evaluated. Discussed etiology and pt advised regarding possible etiology of symptoms. Debrided nails 1-5  using sterile nail nippers and aseptic technique without incident. Discussed with pt to wear supportive shoe gear with wide toed shoes and extra depth in toe box to accommodate the deformity.  Spent 20 minutes in patient care and medical decision making.  Also advised patient to monitor for any signs of infection and visit the ED immediately. Patient to follow up in one week. Spent 20 minutes for patient care and medical decision making.

## 2023-08-25 NOTE — REVIEW OF SYSTEMS
[Fever] : no fever [Chills] : no chills [Eye Pain] : no eye pain [Earache] : no earache [Loss Of Hearing] : no hearing loss [Chest Pain] : no chest pain [Shortness Of Breath] : no shortness of breath [Cough] : no cough [Abdominal Pain] : no abdominal pain [Joint Swelling] : no joint swelling [Skin Lesions] : skin lesion [Confused] : no confusion [Dizziness] : no dizziness [FreeTextEntry9] : sensitive toe nails  [de-identified] : callus to the right foot at the first metatarsal head

## 2023-08-25 NOTE — HISTORY OF PRESENT ILLNESS
[FreeTextEntry1] : Patient is 53 year old male presenting for  for follow up complaining of thickened, elongated nails to the right and left digits 1-5. Patient relates that it has been present for approximately several years and notes that it has been worsening. Patient does not recall trauma to the nails in the past and relates that the nails are not  from the nail bed. Patient states that the nails are thickened and elongated and cause pain when walking with shoes.  Patient had painful callus to the right foot that is causing pain when walking and wearing shoes and has had a history of wounds. Patient is on HD.

## 2023-08-25 NOTE — PHYSICAL EXAM
[General Appearance - Alert] : alert [General Appearance - In No Acute Distress] : in no acute distress [Ankle Swelling (On Exam)] : not present [Varicose Veins Of Lower Extremities] : not present [] : not present [1+] : left foot posterior tibialis 1+ [2+] : left foot dorsalis pedis 2+ [de-identified] : 5/5 muscle strength for all muscles and tendons crossing the foot and ankle joints, ankle joint and STJ ROM intact b/l. No pain with calf compression b/l. Lateral deviation of the hallux and medial deviation of the first metatarsal b/l . Flexion contracture of the digits 2,3 and 4 b/l and varus rotation of the 5th digits b/l.  [FreeTextEntry1] : Light touch sensation diminished to the plantar foot b/l.  [Oriented To Time, Place, And Person] : oriented to person, place, and time

## 2023-09-14 RX ORDER — ALPRAZOLAM 0.5 MG/1
0.5 TABLET ORAL
Qty: 15 | Refills: 0 | Status: ACTIVE | COMMUNITY
Start: 2017-08-20 | End: 1900-01-01

## 2023-10-12 ENCOUNTER — APPOINTMENT (OUTPATIENT)
Dept: CARDIOLOGY | Facility: CLINIC | Age: 53
End: 2023-10-12

## 2023-10-17 RX ORDER — FAMOTIDINE 40 MG/1
40 TABLET, FILM COATED ORAL
Qty: 90 | Refills: 0 | Status: ACTIVE | COMMUNITY
Start: 2017-10-02 | End: 1900-01-01

## 2023-11-02 ENCOUNTER — NON-APPOINTMENT (OUTPATIENT)
Age: 53
End: 2023-11-02

## 2023-11-03 ENCOUNTER — APPOINTMENT (OUTPATIENT)
Dept: INTERNAL MEDICINE | Facility: CLINIC | Age: 53
End: 2023-11-03

## 2023-11-13 ENCOUNTER — NON-APPOINTMENT (OUTPATIENT)
Age: 53
End: 2023-11-13

## 2023-12-14 ENCOUNTER — RX RENEWAL (OUTPATIENT)
Age: 53
End: 2023-12-14

## 2023-12-14 RX ORDER — ALLOPURINOL 100 MG/1
100 TABLET ORAL
Qty: 90 | Refills: 1 | Status: ACTIVE | COMMUNITY
Start: 2018-02-21 | End: 1900-01-01

## 2024-02-01 ENCOUNTER — APPOINTMENT (OUTPATIENT)
Dept: INTERNAL MEDICINE | Facility: CLINIC | Age: 54
End: 2024-02-01

## 2024-03-20 NOTE — DISCHARGE NOTE NURSING/CASE MANAGEMENT/SOCIAL WORK - NSDCPEPTCAREGIVEDUMATLIST _GEN_ALL_CORE
Apixaban/Eliquis/Influenza Vaccination [Feeling Poorly] : not feeling poorly (malaise) [Fever] : no fever [Wgt Loss (___ Lbs)] : no recent weight loss [Pallor] : not pale [Eye Discharge] : no eye discharge [Redness] : no redness [Change in Vision] : no change in vision [Nasal Stuffiness] : no nasal congestion [Sore Throat] : no sore throat [Earache] : no earache [Loss Of Hearing] : no hearing loss [Cyanosis] : no cyanosis [Edema] : no edema [Diaphoresis] : not diaphoretic [Exercise Intolerance] : no persistence of exercise intolerance [Palpitations] : no palpitations [Orthopnea] : no orthopnea [Fast HR] : no tachycardia [Wheezing] : no wheezing [Tachypnea] : not tachypneic [Cough] : no cough [Shortness Of Breath] : not expressed as feeling short of breath [Vomiting] : no vomiting [Diarrhea] : no diarrhea [Abdominal Pain] : no abdominal pain [Decrease In Appetite] : appetite not decreased [Fainting (Syncope)] : no fainting [Seizure] : no seizures [Headache] : no headache [Dizziness] : no dizziness [Limping] : no limping [Joint Pains] : no arthralgias [Rash] : no rash [Joint Swelling] : no joint swelling [Wound problems] : no wound problems [Easy Bruising] : no tendency for easy bruising [Swollen Glands] : no lymphadenopathy [Easy Bleeding] : no ~M tendency for easy bleeding [Nosebleeds] : no epistaxis [Hyperactive] : no hyperactive behavior [Sleep Disturbances] : ~T no sleep disturbances [Depression] : no depression [Anxiety] : no anxiety [Failure To Thrive] : no failure to thrive [Short Stature] : short stature was not noted [Jitteriness] : no jitteriness [Heat/Cold Intolerance] : no temperature intolerance [Dec Urine Output] : no oliguria

## 2024-06-18 NOTE — PROGRESS NOTE ADULT - PROBLEM SELECTOR PLAN 4
Addended by: ZOE EATON on: 6/18/2024 11:11 AM     Modules accepted: Orders     Leukocytosis improving, at 14 today. Febrile in past 24 hours.   Continue Meropenem and Micafungin.  ID team follow up Leukocytosis slightly upto to 15k today ( from 14K yesterday) . On Meropenem and Micafungin. ID is following .

## 2025-04-28 NOTE — PROGRESS NOTE ADULT - NSHPATTENDINGPLANDISCUSS_GEN_ALL_CORE
72
Patient seen on multidisciplinary rounds with Transplant surgeons, nephrologist, NP/PA, pharmacist, and nurse.
Patient seen on multidisciplinary rounds with Transplant surgeons, nephrologist, NP/PA, pharmacist, and nurse.
patient
renal attending
96
transplant tem
Transplant team
Transplant team, primary nephrologist